# Patient Record
Sex: MALE | Race: WHITE | NOT HISPANIC OR LATINO | Employment: UNEMPLOYED | URBAN - METROPOLITAN AREA
[De-identification: names, ages, dates, MRNs, and addresses within clinical notes are randomized per-mention and may not be internally consistent; named-entity substitution may affect disease eponyms.]

---

## 2018-02-05 ENCOUNTER — HOSPITAL ENCOUNTER (EMERGENCY)
Facility: CLINIC | Age: 28
Discharge: HOME | End: 2018-02-05
Attending: EMERGENCY MEDICINE | Admitting: EMERGENCY MEDICINE

## 2018-02-05 DIAGNOSIS — F10.920 ALCOHOLIC INTOXICATION WITHOUT COMPLICATION (H): ICD-10-CM

## 2018-02-05 PROCEDURE — 99284 EMERGENCY DEPT VISIT MOD MDM: CPT | Mod: Z6 | Performed by: EMERGENCY MEDICINE

## 2018-02-05 PROCEDURE — 99285 EMERGENCY DEPT VISIT HI MDM: CPT | Performed by: EMERGENCY MEDICINE

## 2018-02-05 ASSESSMENT — ENCOUNTER SYMPTOMS
FEVER: 0
TROUBLE SWALLOWING: 0
SORE THROAT: 0
BACK PAIN: 0
FATIGUE: 0
NAUSEA: 0
VOMITING: 0
ABDOMINAL PAIN: 0
NECK PAIN: 0
CHILLS: 0

## 2018-02-05 NOTE — ED PROVIDER NOTES
History     Chief Complaint   Patient presents with     Alcohol Intoxication     Pt was found passed out in a snowbank. BIB police in cuffs.     The history is provided by the patient and the police. The history is limited by the condition of the patient (intoxication).     Dax Villareal is a 27 year old male, ADHD, smoker, who is brought in by police for concern of alcohol intoxication.    Local bar called PD as they found's somebody significant only intoxicated and was near passing out in a snowbank from intoxication.  They attempted to take the patient to [address] [place] [address] on the patient was initially amenable, however when they arrived there the patient refused services and therefore as the patient was not voluntary was not brought in further detox services.  He was then brought in by PD to the ED as he is not able to find a sober ride into whose care he could go.    Patient currently reports to alcohol use only but a fairly significant amount.  He reports that he tends to get intoxicated fairly often.  He is not interested in any detox services.  He denies any other drug use.  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  Please see ROS for further details.    I have reviewed the Medications, Allergies, Past Medical and Surgical History, and Social History in the Epic system.    Review of Systems   Reason unable to perform ROS: Intoxication.   Constitutional: Negative for chills, fatigue and fever.   HENT: Negative for sore throat and trouble swallowing.    Respiratory: Negative for cough and shortness of breath.    Cardiovascular: Negative for chest pain.   Gastrointestinal: Negative for abdominal pain, nausea and vomiting.   Musculoskeletal: Negative for back pain and neck pain.   Skin:        Old scars from working in     Allergic/Immunologic: Negative for immunocompromised state.       Physical Exam          Physical Exam  CONSTITUTIONAL: Well-developed and well-nourished. Intoxicated, but awake and alert. Non-toxic appearance. No acute distress.   HENT:   - Head: Normocephalic and atraumatic.   - Ears: Hearing and external ear grossly normal.   - Nose: Nose normal. No rhinorrhea. No epistaxis.   - Mouth/Throat: Oropharynx is clear and MMM  EYES: Conjunctivae and lids are normal. No scleral icterus.   NECK: Normal range of motion and phonation normal. Neck supple.  No tracheal deviation, no stridor. No edema or erythema noted.  CARDIOVASCULAR: Normal rate, regular rhythm and no appreciable abnormal heart sounds.  PULMONARY/CHEST: Effort normal. No accessory muscle usage or stridor. No respiratory distress.  No appreciable abnormal breath sounds.  MUSCULOSKELETAL: Extremities warm and seemingly well perfused. No edema or calf tenderness.  NEUROLOGIC: Awake, alert. Not disoriented. He displays no atrophy and no tremor. Normal tone. No seizure activity. GCS 15 (though intoxicated)  SKIN: Skin is warm and dry. No rash noted. No diaphoresis. No pallor.   PSYCHIATRIC: Normal mood and affect. Speech and behavior normal. Thought processes linear. Cognition and memory are normal.     ED Course     ED Course     Procedures               Labs Ordered and Resulted from Time of ED Arrival Up to the Time of Departure from the ED - No data to display         Assessments & Plan (with Medical Decision Making)   IMPRESSION: 27-year-old male, PMH notable for ADHD, brought in by law enforcement in the setting of alcohol intoxication having refused formal detox treatment at [address] [place] [address], with no ability to find a sober friend to safely escort him home and monitor him.  Clinically, patient appears nontoxic, NAD.  He is certainly intoxicated but has an appropriate gait.  No new or emergent physical exam findings that would need immediate  workup.  Outside of his intoxication he still mentating fairly well, no obvious neuro deficits.  We'll continue to monitor until sober or until he can find a responsible sober adult to come pick him up presuming ideally be able to tolerate by mouth as he does appear to be old to safely ambulate so far at this point.    PLAN: Clinically monitor until sober, manage as needed    RE-EVALUATION:  Patient able to ambulate still, unassisted. Has remained cooperative. Tolerated PO w/o issue.  - Patient's mother has arrived and is welling to assume care of the patient.     DISCUSSIONS:  - w/ Patient and his mother: I have reviewed the available findings, plan, need for close follow up, and strict return instructions with the patient and his mother. Mother is sober and will assume care of patient. They expressed understanding and agreement with this plan. All questions answered to the best of our ability at this time.     DISPOSITION/PLANNING:  - FINAL IMPRESSION: Acute alcohol intoxication  - DISPOSITION: DC to home into care of his mother  --- Follow-up: w/ PCP, information provided for this and Detox  --- Recommendations: Conservative symptom management, strict return instructions    ______________________________________________________________________________    - I have reviewed the available nursing notes.      New Prescriptions    No medications on file       Final diagnoses:   None       2/5/2018   [facility], [place], EMERGENCY DEPARTMENT     Casie Tai MD  02/06/18 2735

## 2018-02-05 NOTE — ED NOTES
Pt was given his cell phone to call a sober ride. Pt needed multiple reminders that he can only be discharged when he is sober or if he can find a sober friend to pick him up.

## 2018-02-05 NOTE — ED AVS SNAPSHOT
[facility], Emergency Department    [address]    [place] [address]    Phone:  [phone]    Fax:  [phone]                                       Dax Villareal   MRN: [id number]    Department:  [facility], Emergency Department   Date of Visit:  2/5/2018           Patient Information     Date Of Birth          1990        Your diagnoses for this visit were:     Alcoholic intoxication without complication (H)        You were seen by Casie Tai MD.        Discharge Instructions       FOLLOW-UP:  Please make an appointment to follow up with:  - [facility] (phone: [phone] and [facility] (phone: [phone])  - [facility]: [phone]    RETURN TO THE EMERGENCY DEPARTMENT  Return to the Emergency Department at any time for new/worsening symptoms.       Alcohol Intoxication  Alcohol intoxication occurs when you drink alcohol faster than your liver can remove it from your system. The following facts are important to remember:    It can take 10 minutes or more to start to feel the effects of a drink, so you can easily get more intoxicated than you intended.    One drink may be more than 1 serving of alcohol. Depending on the drink, it can be 2 to 4 servings.    It takes about an hour for your body to metabolize (clear) 1 serving. If you have more than 1 drink, it can take a couple of hours or more.    Many things affect how drinks will affect you, including whether you ve eaten, how fast you drink, your size, how much you normally drink (or not), medicines you take, chronic diseases you have, and gender.  Signs and symptoms of alcohol poisoning  The following are signs and symptoms of alcohol poisoning:  Mild impairment    Reduced inhibitions    Slurred speech    Drowsiness    Decreased fine motor skills  Moderate impairment    Erratic behavior, aggression, depression    Impaired judgment    Confusion    Concentration difficulties    Coordination  "problems  Severe impairment    Vomiting    Seizures    Unconsciousness    Cold, clammy    Slow or irregular breathing    Hypothermia (low body temperature)    Coma  Health effects  Alcohol abuse causes health problems. Sometimes this can happen after only drinking a  little.\" There is no set number of drinks or amount of alcohol that defines too much. The more you drink at one time, and the more frequently you drink determine both the short-term and long-term health effects. It affects all parts of your body and your health, including your:    Brain. Alcohol is a central nervous system depressant. It can damage parts of the brain that affect your balance, memory, thinking, and emotions. It can cause memory loss, blackouts, depression, agitation, sleep cycle changes, and seizures. These changes may or may not be reversible.    Heart and vascular system. Alcohol affects multiple areas. It can damage heart muscle causing cardiomyopathy, which is a weakening and stretching of the heart muscle. This can lead to trouble breathing, an irregular heartbeat, atrial fibrillation, leg swelling, and heart failure. It makes the blood vessels stiffen causing hypertension (high blood pressure). All of these problems increase your risk of having heart attacks or strokes.    Liver. Alcohol causes fat to build up in the liver, affecting its normal function. This increases the risk for hepatitis, leading to abdominal pain, appetite loss, jaundice, bleeding problems, liver fibrosis, and cirrhosis. This in turn can affect your ability to fight off infections, and can cause diabetes. The liver changes prevent it from removing toxins in your blood that can cause encephalopathy. Signs of this are confusion, altered level of consciousness, personality changes, memory loss, seizures, coma, and death.    Pancreas. Alcohol can cause inflammation of the pancreas, or pancreatitis. This can cause pain in your abdomen, fever, and " diabetes.    Immune system. Alcohol weakens your immune system in a number of ways. It suppresses your immune system making it harder to fight off infections and colds. You will also have a higher risk of certain infections like pneumonia and tuberculosis.    Cancer risk. Alcohol raises your risk of cancer of the mouth, esophagus, pharynx, larynx, liver, and breast.    Sexual function. Alcohol abuse can also lead to sexual problems.  Alcohol use during pregnancy may cause permanent damage to the growing baby.  Home care  The following guidelines will help you care for yourself at home:    Don't drink any more alcohol.    Don't drive until all effects of the alcohol have worn off.    Don't operate machinery that can cause injuries.    Get lots of rest over the next few days. Drink plenty of water and other non-alcoholic liquids. Try to eat regular meals.    If you have been drinking heavily on a daily basis, you may go through alcohol withdrawal. The usual symptoms last 3 to 4 days and may include nervousness, shakiness, nausea, sweating, sleeplessness, and can even cause seizures and a serious withdrawal symptom called delirium tremens, or DTs. During this time, it is best that you stay with family or friends who can help and support you. You can also admit yourself to a residential detox program. If your symptoms are severe (seizures, severe shakiness, confusion), contact your doctor or call an ambulance for help (see below).   Follow-up care  If alcohol is a problem in your life, these are some organizations that can help you:    Alcoholics Anonymous offers support through a self-help fellowship. There are no dues or fees. See the Yellow Pages and call for time and place of meetings. Find AA online at www.aa.org.    [place] offers support to families of alcohol users. Contact 554-606-8312, or online at www.al-anon.org.    National [place] on Alcoholism and Drug Dependence can be reached at 649-349-8137, or online  at www.ncadd.org.    There are also inpatient and residential alcohol detox programs. Check the Internet or phonebook Yellow Pages under  Drug Abuse and Treatment Centers.   Call 911  Call 911 if any of these occur:    Trouble breathing or slow irregular breathing    Chest pain    Sudden weakness on one side of your body or sudden trouble speaking    Heavy bleeding or vomiting blood    Very drowsy or trouble awakening    Fainting or loss of consciousness    Rapid heart rate    Seizure  When to seek medical advice  Call your healthcare provider right away if any of these occur:    Severe shakiness     Fever over 100.4  F (38.0  C)    Confusion or hallucinations (seeing, hearing, or feeling things that are not there)    Pain in your upper abdomen that gets worse    Repeated vomiting  Date Last Reviewed: 6/1/2016 2000-2017 [employer]. [address] [place] [address]. All rights reserved. This information is not intended as a substitute for professional medical care. Always follow your healthcare professional's instructions.            24 Hour Appointment Hotline       To make an appointment at any [facility], call [phone] ([phone]). If you don't have a family doctor or clinic, we will help you find one. [facility] clinics are conveniently located to serve the needs of you and your family.             Review of your medicines      Notice     You have not been prescribed any medications.            Orders Needing Specimen Collection     None      Pending Results     No orders found from 2/3/2018 to 2/6/2018.            Pending Culture Results     No orders found from 2/3/2018 to 2/6/2018.            Pending Results Instructions     If you had any lab results that were not finalized at the time of your Discharge, you can call the ED Lab Result RN at [phone]. You will be contacted by this team for any positive Lab results or changes in treatment. The nurses are available 7  "days a week from 10A to 6:30P.  You can leave a message 24 hours per day and they will return your call.        Thank you for choosing [facility]       Thank you for choosing [facility] for your care. Our goal is always to provide you with excellent care. Hearing back from our patients is one way we can continue to improve our services. Please take a few minutes to complete the written survey that you may receive in the mail after you visit with us. Thank you!        [facility] Information     [employer] lets you send messages to your doctor, view your test results, renew your prescriptions, schedule appointments and more. To sign up, go to [email or web address] . Click on \"Log in\" on the left side of the screen, which will take you to the Welcome page. Then click on \"Sign up Now\" on the right side of the page.     You will be asked to enter the access code listed below, as well as some personal information. Please follow the directions to create your username and password.     Your access code is: [email or web address]  Expires: 2018  5:09 AM     Your access code will  in 90 days. If you need help or a new code, please call your [facility] clinic or [phone].        Care EveryWhere ID     This is your Care EveryWhere ID. This could be used by other organizations to access your [facility] medical records  [id number]        Equal Access to Services     HENRIQUE ORTEGA AH: Hadii natalie Nina, waaxda ludebora, qaybta kaalaye delgadillo. So [facility] [phone].    ATENCIÓN: Si habla español, tiene a chavarria disposición servicios gratuitos de asistencia lingüística. Edwige al [phone].    We comply with applicable federal civil rights laws and [place] laws. We do not discriminate on the basis of race, color, national origin, age, disability, sex, sexual orientation, or gender identity.            After Visit Summary       This is your record. Keep this with you and show to your " community pharmacist(s) and doctor(s) at your next visit.

## 2018-02-05 NOTE — DISCHARGE INSTRUCTIONS
"FOLLOW-UP:  Please make an appointment to follow up with:  - [facility] (phone: [phone] and [facility] (phone: [phone])  - [facility]: [phone]    RETURN TO THE EMERGENCY DEPARTMENT  Return to the Emergency Department at any time for new/worsening symptoms.       Alcohol Intoxication  Alcohol intoxication occurs when you drink alcohol faster than your liver can remove it from your system. The following facts are important to remember:    It can take 10 minutes or more to start to feel the effects of a drink, so you can easily get more intoxicated than you intended.    One drink may be more than 1 serving of alcohol. Depending on the drink, it can be 2 to 4 servings.    It takes about an hour for your body to metabolize (clear) 1 serving. If you have more than 1 drink, it can take a couple of hours or more.    Many things affect how drinks will affect you, including whether you ve eaten, how fast you drink, your size, how much you normally drink (or not), medicines you take, chronic diseases you have, and gender.  Signs and symptoms of alcohol poisoning  The following are signs and symptoms of alcohol poisoning:  Mild impairment    Reduced inhibitions    Slurred speech    Drowsiness    Decreased fine motor skills  Moderate impairment    Erratic behavior, aggression, depression    Impaired judgment    Confusion    Concentration difficulties    Coordination problems  Severe impairment    Vomiting    Seizures    Unconsciousness    Cold, clammy    Slow or irregular breathing    Hypothermia (low body temperature)    Coma  Health effects  Alcohol abuse causes health problems. Sometimes this can happen after only drinking a  little.\" There is no set number of drinks or amount of alcohol that defines too much. The more you drink at one time, and the more frequently you drink determine both the short-term and long-term health effects. It affects all parts of your body and your " health, including your:    Brain. Alcohol is a central nervous system depressant. It can damage parts of the brain that affect your balance, memory, thinking, and emotions. It can cause memory loss, blackouts, depression, agitation, sleep cycle changes, and seizures. These changes may or may not be reversible.    Heart and vascular system. Alcohol affects multiple areas. It can damage heart muscle causing cardiomyopathy, which is a weakening and stretching of the heart muscle. This can lead to trouble breathing, an irregular heartbeat, atrial fibrillation, leg swelling, and heart failure. It makes the blood vessels stiffen causing hypertension (high blood pressure). All of these problems increase your risk of having heart attacks or strokes.    Liver. Alcohol causes fat to build up in the liver, affecting its normal function. This increases the risk for hepatitis, leading to abdominal pain, appetite loss, jaundice, bleeding problems, liver fibrosis, and cirrhosis. This in turn can affect your ability to fight off infections, and can cause diabetes. The liver changes prevent it from removing toxins in your blood that can cause encephalopathy. Signs of this are confusion, altered level of consciousness, personality changes, memory loss, seizures, coma, and death.    Pancreas. Alcohol can cause inflammation of the pancreas, or pancreatitis. This can cause pain in your abdomen, fever, and diabetes.    Immune system. Alcohol weakens your immune system in a number of ways. It suppresses your immune system making it harder to fight off infections and colds. You will also have a higher risk of certain infections like pneumonia and tuberculosis.    Cancer risk. Alcohol raises your risk of cancer of the mouth, esophagus, pharynx, larynx, liver, and breast.    Sexual function. Alcohol abuse can also lead to sexual problems.  Alcohol use during pregnancy may cause permanent damage to the growing baby.  Home care  The following  guidelines will help you care for yourself at home:    Don't drink any more alcohol.    Don't drive until all effects of the alcohol have worn off.    Don't operate machinery that can cause injuries.    Get lots of rest over the next few days. Drink plenty of water and other non-alcoholic liquids. Try to eat regular meals.    If you have been drinking heavily on a daily basis, you may go through alcohol withdrawal. The usual symptoms last 3 to 4 days and may include nervousness, shakiness, nausea, sweating, sleeplessness, and can even cause seizures and a serious withdrawal symptom called delirium tremens, or DTs. During this time, it is best that you stay with family or friends who can help and support you. You can also admit yourself to a residential detox program. If your symptoms are severe (seizures, severe shakiness, confusion), contact your doctor or call an ambulance for help (see below).   Follow-up care  If alcohol is a problem in your life, these are some organizations that can help you:    Alcoholics Anonymous offers support through a self-help fellowship. There are no dues or fees. See the Yellow Pages and call for time and place of meetings. Find AA online at www.aa.org.    [place] offers support to families of alcohol users. Contact 968-903-8095, or online at www.al-anon.org.    National [place] on Alcoholism and Drug Dependence can be reached at 853-636-3546, or online at www.ncadd.org.    There are also inpatient and residential alcohol detox programs. Check the Internet or phonebook Yellow Pages under  Drug Abuse and Treatment Centers.   Call 911  Call 911 if any of these occur:    Trouble breathing or slow irregular breathing    Chest pain    Sudden weakness on one side of your body or sudden trouble speaking    Heavy bleeding or vomiting blood    Very drowsy or trouble awakening    Fainting or loss of consciousness    Rapid heart rate    Seizure  When to seek medical advice  Call your healthcare  provider right away if any of these occur:    Severe shakiness     Fever over 100.4  F (38.0  C)    Confusion or hallucinations (seeing, hearing, or feeling things that are not there)    Pain in your upper abdomen that gets worse    Repeated vomiting  Date Last Reviewed: 6/1/2016 2000-2017 [employer]. [address] [place] [address]. All rights reserved. This information is not intended as a substitute for professional medical care. Always follow your healthcare professional's instructions.

## 2018-02-05 NOTE — ED AVS SNAPSHOT
[facility], [place], Emergency Department    [address]    [facility] [address]    Phone:  [phone]    Fax:  [phone]                                       Dax Villareal   MRN: [id number]    Department:  [facility], Emergency Department   Date of Visit:  2/5/2018           After Visit Summary Signature Page     I have received my discharge instructions, and my questions have been answered. I have discussed any challenges I see with this plan with the nurse or doctor.    ..........................................................................................................................................  Patient/Patient Representative Signature      ..........................................................................................................................................  Patient Representative Print Name and Relationship to Patient    ..................................................               ................................................  Date                                            Time    ..........................................................................................................................................  Reviewed by Signature/Title    ...................................................              ..............................................  Date                                                            Time

## 2018-02-05 NOTE — ED NOTES
Pt continues to ask if he can take an Uber or Lyft to get home. This RN attempted to reinforce that he has to sober up or find a friend to pick him up. No evidence of learning.

## 2018-02-06 ASSESSMENT — ENCOUNTER SYMPTOMS
COUGH: 0
SHORTNESS OF BREATH: 0

## 2020-10-06 ENCOUNTER — APPOINTMENT (OUTPATIENT)
Dept: ULTRASOUND IMAGING | Facility: HOSPITAL | Age: 30
End: 2020-10-06
Attending: EMERGENCY MEDICINE

## 2020-10-06 ENCOUNTER — APPOINTMENT (OUTPATIENT)
Dept: GENERAL RADIOLOGY | Facility: HOSPITAL | Age: 30
End: 2020-10-06
Attending: EMERGENCY MEDICINE

## 2020-10-06 ENCOUNTER — HOSPITAL ENCOUNTER (EMERGENCY)
Facility: HOSPITAL | Age: 30
Discharge: HOME | End: 2020-10-06
Attending: EMERGENCY MEDICINE | Admitting: EMERGENCY MEDICINE

## 2020-10-06 VITALS
OXYGEN SATURATION: 98 % | HEART RATE: 91 BPM | TEMPERATURE: 97.1 F | RESPIRATION RATE: 18 BRPM | SYSTOLIC BLOOD PRESSURE: 134 MMHG | DIASTOLIC BLOOD PRESSURE: 98 MMHG

## 2020-10-06 DIAGNOSIS — E87.1 HYPONATREMIA: ICD-10-CM

## 2020-10-06 DIAGNOSIS — K29.20 ACUTE ALCOHOLIC GASTRITIS, PRESENCE OF BLEEDING UNSPECIFIED: Primary | ICD-10-CM

## 2020-10-06 DIAGNOSIS — R11.10 RECURRENT VOMITING: ICD-10-CM

## 2020-10-06 DIAGNOSIS — E87.6 HYPOKALEMIA: ICD-10-CM

## 2020-10-06 LAB
ALBUMIN SERPL-MCNC: 3.8 G/DL (ref 3.4–5)
ALBUMIN UR-MCNC: 10 MG/DL
ALP SERPL-CCNC: 78 U/L (ref 40–150)
ALT SERPL W P-5'-P-CCNC: 75 U/L (ref 0–70)
AMPHETAMINES UR QL: NOT DETECTED NG/ML
ANION GAP SERPL CALCULATED.3IONS-SCNC: 12 MMOL/L (ref 3–14)
APPEARANCE UR: CLEAR
AST SERPL W P-5'-P-CCNC: 54 U/L (ref 0–45)
BACTERIA #/AREA URNS HPF: ABNORMAL /HPF
BARBITURATES UR QL SCN: NOT DETECTED NG/ML
BASOPHILS # BLD AUTO: 0 10E9/L (ref 0–0.2)
BASOPHILS NFR BLD AUTO: 0.6 %
BENZODIAZ UR QL SCN: NOT DETECTED NG/ML
BILIRUB SERPL-MCNC: 1.6 MG/DL (ref 0.2–1.3)
BILIRUB UR QL STRIP: NEGATIVE
BUN SERPL-MCNC: 31 MG/DL (ref 7–30)
BUPRENORPHINE UR QL: NOT DETECTED NG/ML
CALCIUM SERPL-MCNC: 9.6 MG/DL (ref 8.5–10.1)
CANNABINOIDS UR QL: NOT DETECTED NG/ML
CHLORIDE SERPL-SCNC: 79 MMOL/L (ref 94–109)
CO2 SERPL-SCNC: 37 MMOL/L (ref 20–32)
COCAINE UR QL SCN: NOT DETECTED NG/ML
COLOR UR AUTO: ABNORMAL
CREAT SERPL-MCNC: 0.83 MG/DL (ref 0.66–1.25)
CRP SERPL-MCNC: 16.9 MG/L (ref 0–8)
D-METHAMPHET UR QL: NOT DETECTED NG/ML
DIFFERENTIAL METHOD BLD: ABNORMAL
EOSINOPHIL # BLD AUTO: 0.1 10E9/L (ref 0–0.7)
EOSINOPHIL NFR BLD AUTO: 0.7 %
ERYTHROCYTE [DISTWIDTH] IN BLOOD BY AUTOMATED COUNT: 12.1 % (ref 10–15)
ETHANOL SERPL-MCNC: <0.01 G/DL
GFR SERPL CREATININE-BSD FRML MDRD: >90 ML/MIN/{1.73_M2}
GLUCOSE SERPL-MCNC: 120 MG/DL (ref 70–99)
GLUCOSE UR STRIP-MCNC: NEGATIVE MG/DL
HCT VFR BLD AUTO: 51.2 % (ref 40–53)
HGB BLD-MCNC: 18.8 G/DL (ref 13.3–17.7)
HGB UR QL STRIP: NEGATIVE
IMM GRANULOCYTES # BLD: 0.1 10E9/L (ref 0–0.4)
IMM GRANULOCYTES NFR BLD: 0.7 %
KETONES UR STRIP-MCNC: 40 MG/DL
LACTATE BLD-SCNC: 1.5 MMOL/L (ref 0.7–2)
LEUKOCYTE ESTERASE UR QL STRIP: NEGATIVE
LIPASE SERPL-CCNC: 139 U/L (ref 73–393)
LYMPHOCYTES # BLD AUTO: 0.8 10E9/L (ref 0.8–5.3)
LYMPHOCYTES NFR BLD AUTO: 11.6 %
MCH RBC QN AUTO: 32.6 PG (ref 26.5–33)
MCHC RBC AUTO-ENTMCNC: 36.7 G/DL (ref 31.5–36.5)
MCV RBC AUTO: 89 FL (ref 78–100)
METHADONE UR QL SCN: NOT DETECTED NG/ML
MONOCYTES # BLD AUTO: 0.8 10E9/L (ref 0–1.3)
MONOCYTES NFR BLD AUTO: 11 %
MUCOUS THREADS #/AREA URNS LPF: PRESENT /LPF
NEUTROPHILS # BLD AUTO: 5.2 10E9/L (ref 1.6–8.3)
NEUTROPHILS NFR BLD AUTO: 75.4 %
NITRATE UR QL: NEGATIVE
NRBC # BLD AUTO: 0 10*3/UL
NRBC BLD AUTO-RTO: 0 /100
OPIATES UR QL SCN: NOT DETECTED NG/ML
OXYCODONE UR QL SCN: NOT DETECTED NG/ML
PCP UR QL SCN: NOT DETECTED NG/ML
PH UR STRIP: 7 PH (ref 4.7–8)
PLATELET # BLD AUTO: 311 10E9/L (ref 150–450)
POTASSIUM SERPL-SCNC: 2.5 MMOL/L (ref 3.4–5.3)
POTASSIUM SERPL-SCNC: 2.9 MMOL/L (ref 3.4–5.3)
PROPOXYPH UR QL: NOT DETECTED NG/ML
PROT SERPL-MCNC: 8.4 G/DL (ref 6.8–8.8)
RBC # BLD AUTO: 5.77 10E12/L (ref 4.4–5.9)
RBC #/AREA URNS AUTO: 1 /HPF (ref 0–2)
SODIUM SERPL-SCNC: 128 MMOL/L (ref 133–144)
SOURCE: ABNORMAL
SP GR UR STRIP: 1.02 (ref 1–1.03)
TRICYCLICS UR QL SCN: NOT DETECTED NG/ML
UROBILINOGEN UR STRIP-MCNC: 2 MG/DL (ref 0–2)
WBC # BLD AUTO: 6.9 10E9/L (ref 4–11)
WBC #/AREA URNS AUTO: 1 /HPF (ref 0–5)

## 2020-10-06 PROCEDURE — 250N000013 HC RX MED GY IP 250 OP 250 PS 637: Performed by: EMERGENCY MEDICINE

## 2020-10-06 PROCEDURE — 85025 COMPLETE CBC W/AUTO DIFF WBC: CPT | Performed by: EMERGENCY MEDICINE

## 2020-10-06 PROCEDURE — 80306 DRUG TEST PRSMV INSTRMNT: CPT | Performed by: EMERGENCY MEDICINE

## 2020-10-06 PROCEDURE — C9113 INJ PANTOPRAZOLE SODIUM, VIA: HCPCS | Performed by: EMERGENCY MEDICINE

## 2020-10-06 PROCEDURE — 80320 DRUG SCREEN QUANTALCOHOLS: CPT | Performed by: EMERGENCY MEDICINE

## 2020-10-06 PROCEDURE — 86140 C-REACTIVE PROTEIN: CPT | Performed by: EMERGENCY MEDICINE

## 2020-10-06 PROCEDURE — 83605 ASSAY OF LACTIC ACID: CPT | Performed by: EMERGENCY MEDICINE

## 2020-10-06 PROCEDURE — 74019 RADEX ABDOMEN 2 VIEWS: CPT

## 2020-10-06 PROCEDURE — 99285 EMERGENCY DEPT VISIT HI MDM: CPT | Mod: 25

## 2020-10-06 PROCEDURE — 258N000003 HC RX IP 258 OP 636: Performed by: EMERGENCY MEDICINE

## 2020-10-06 PROCEDURE — 36415 COLL VENOUS BLD VENIPUNCTURE: CPT | Performed by: EMERGENCY MEDICINE

## 2020-10-06 PROCEDURE — 81001 URINALYSIS AUTO W/SCOPE: CPT | Performed by: EMERGENCY MEDICINE

## 2020-10-06 PROCEDURE — 250N000011 HC RX IP 250 OP 636: Performed by: EMERGENCY MEDICINE

## 2020-10-06 PROCEDURE — 83690 ASSAY OF LIPASE: CPT | Performed by: EMERGENCY MEDICINE

## 2020-10-06 PROCEDURE — 80053 COMPREHEN METABOLIC PANEL: CPT | Performed by: EMERGENCY MEDICINE

## 2020-10-06 PROCEDURE — 96365 THER/PROPH/DIAG IV INF INIT: CPT

## 2020-10-06 PROCEDURE — 96375 TX/PRO/DX INJ NEW DRUG ADDON: CPT

## 2020-10-06 PROCEDURE — 99285 EMERGENCY DEPT VISIT HI MDM: CPT | Performed by: EMERGENCY MEDICINE

## 2020-10-06 PROCEDURE — 96366 THER/PROPH/DIAG IV INF ADDON: CPT

## 2020-10-06 PROCEDURE — 76705 ECHO EXAM OF ABDOMEN: CPT

## 2020-10-06 PROCEDURE — 84132 ASSAY OF SERUM POTASSIUM: CPT | Performed by: EMERGENCY MEDICINE

## 2020-10-06 RX ORDER — LORAZEPAM 2 MG/ML
1 INJECTION INTRAMUSCULAR ONCE
Status: COMPLETED | OUTPATIENT
Start: 2020-10-06 | End: 2020-10-06

## 2020-10-06 RX ORDER — POTASSIUM CHLORIDE 1500 MG/1
20 TABLET, EXTENDED RELEASE ORAL 2 TIMES DAILY
Qty: 6 TABLET | Refills: 0 | Status: SHIPPED | OUTPATIENT
Start: 2020-10-06 | End: 2020-10-09

## 2020-10-06 RX ORDER — FAMOTIDINE 40 MG/1
40 TABLET, FILM COATED ORAL DAILY
Qty: 30 TABLET | Refills: 0 | Status: SHIPPED | OUTPATIENT
Start: 2020-10-06 | End: 2021-01-11

## 2020-10-06 RX ORDER — SODIUM CHLORIDE 9 MG/ML
INJECTION, SOLUTION INTRAVENOUS CONTINUOUS
Status: DISCONTINUED | OUTPATIENT
Start: 2020-10-06 | End: 2020-10-06 | Stop reason: HOSPADM

## 2020-10-06 RX ORDER — POTASSIUM CL/LIDO/0.9 % NACL 10MEQ/0.1L
10 INTRAVENOUS SOLUTION, PIGGYBACK (ML) INTRAVENOUS
Status: COMPLETED | OUTPATIENT
Start: 2020-10-06 | End: 2020-10-06

## 2020-10-06 RX ORDER — DIAZEPAM 5 MG
5 TABLET ORAL EVERY 6 HOURS PRN
Qty: 20 TABLET | Refills: 0 | Status: SHIPPED | OUTPATIENT
Start: 2020-10-06 | End: 2021-01-11

## 2020-10-06 RX ORDER — ONDANSETRON 4 MG/1
4 TABLET, ORALLY DISINTEGRATING ORAL EVERY 8 HOURS PRN
Qty: 10 TABLET | Refills: 0 | Status: SHIPPED | OUTPATIENT
Start: 2020-10-06 | End: 2020-10-09

## 2020-10-06 RX ORDER — POTASSIUM CHLORIDE 1500 MG/1
40 TABLET, EXTENDED RELEASE ORAL ONCE
Status: COMPLETED | OUTPATIENT
Start: 2020-10-06 | End: 2020-10-06

## 2020-10-06 RX ORDER — ONDANSETRON 2 MG/ML
4 INJECTION INTRAMUSCULAR; INTRAVENOUS ONCE
Status: COMPLETED | OUTPATIENT
Start: 2020-10-06 | End: 2020-10-06

## 2020-10-06 RX ADMIN — POTASSIUM CHLORIDE 40 MEQ: 1500 TABLET, EXTENDED RELEASE ORAL at 09:57

## 2020-10-06 RX ADMIN — Medication 10 MEQ: at 09:58

## 2020-10-06 RX ADMIN — Medication 10 MEQ: at 12:13

## 2020-10-06 RX ADMIN — LORAZEPAM 1 MG: 2 INJECTION INTRAMUSCULAR; INTRAVENOUS at 10:07

## 2020-10-06 RX ADMIN — SODIUM CHLORIDE 1000 ML: 9 INJECTION, SOLUTION INTRAVENOUS at 10:07

## 2020-10-06 RX ADMIN — ONDANSETRON 4 MG: 2 INJECTION INTRAMUSCULAR; INTRAVENOUS at 09:58

## 2020-10-06 RX ADMIN — PANTOPRAZOLE SODIUM 40 MG: 40 INJECTION, POWDER, FOR SOLUTION INTRAVENOUS at 10:07

## 2020-10-06 ASSESSMENT — ENCOUNTER SYMPTOMS
ABDOMINAL PAIN: 1
NAUSEA: 1
SHORTNESS OF BREATH: 0
VOMITING: 1
FEVER: 0

## 2020-10-06 NOTE — ED PROVIDER NOTES
History     Chief Complaint   Patient presents with     Vomiting     x3 days. epigastric discomfort stating due to vomiting. no abd pain noted. has appendix and gallbladder. last drank alcohol Friday-states drinks 5 days a week.      JOSE Villareal is a 29 year old male who presents to the ED via private means.  Patient gives a 3-day history of recurrent episodes of vomiting with associated nausea and epigastric abdominal pain.  He has been drinking a lot of alcohol at least 4 bottles of shots every day for at least 5 days/week.  Last time he drank alcohol was 2 days ago and he is feeling a little shaky today.  He has never had pancreatitis.  His last bowel movement was this morning with a small amount of hard stool being passed.  He has not been able to eat or retain anything for the last 3 days.  Denies abdominal distention, prior abdominal surgery or history of small bowel obstruction.  No fever, chills, loss of taste or smell.  No cough or shortness of breath.    Allergies:  No Known Allergies    Problem List:    There are no active problems to display for this patient.       Past Medical History:    Past Medical History:   Diagnosis Date     ADHD 2008       Past Surgical History:    Past Surgical History:   Procedure Laterality Date     ORTHOPEDIC SURGERY         Family History:    No family history on file.    Social History:  Marital Status:  Single [1]  Social History     Tobacco Use     Smoking status: Current Every Day Smoker   Substance Use Topics     Alcohol use: Yes     Drug use: No        Medications:         diazepam (VALIUM) 5 MG tablet       famotidine (PEPCID) 40 MG tablet       ondansetron (ZOFRAN ODT) 4 MG ODT tab       potassium chloride ER (KLOR-CON M) 20 MEQ CR tablet          Review of Systems   Constitutional: Negative for fever.   Respiratory: Negative for shortness of breath.    Cardiovascular: Negative for chest pain.   Gastrointestinal: Positive for abdominal pain, nausea and  vomiting.   All other systems reviewed and are negative.      Physical Exam   BP: 147/85  Pulse: 116  Temp: 97.4  F (36.3  C)  Resp: 16  SpO2: 95 %      Physical Exam  Vitals signs and nursing note reviewed.   Constitutional:       General: He is not in acute distress.     Appearance: He is well-developed. He is not diaphoretic.   HENT:      Head: Normocephalic and atraumatic.   Eyes:      Pupils: Pupils are equal, round, and reactive to light.   Cardiovascular:      Rate and Rhythm: Normal rate and regular rhythm.      Heart sounds: Normal heart sounds.   Pulmonary:      Effort: Pulmonary effort is normal. No respiratory distress.      Breath sounds: Normal breath sounds. No stridor.   Abdominal:      General: Bowel sounds are normal. There is no distension.      Tenderness: There is no abdominal tenderness.   Musculoskeletal:         General: No tenderness or deformity.   Neurological:      Mental Status: He is alert and oriented to person, place, and time.      Cranial Nerves: No cranial nerve deficit.         ED Course   Patient evaluated and laboratory tests ordered.  Started on IV fluid hydration.  IV Ativan given for alcohol withdrawal.  IV Protonix given for epigastric abdominal pain.  IV Zofran given for nausea and vomiting.    Procedures      Results for orders placed or performed during the hospital encounter of 10/06/20 (from the past 24 hour(s))   Lactic acid whole blood   Result Value Ref Range    Lactic Acid 1.5 0.7 - 2.0 mmol/L   CBC with platelets differential   Result Value Ref Range    WBC 6.9 4.0 - 11.0 10e9/L    RBC Count 5.77 4.4 - 5.9 10e12/L    Hemoglobin 18.8 (H) 13.3 - 17.7 g/dL    Hematocrit 51.2 40.0 - 53.0 %    MCV 89 78 - 100 fl    MCH 32.6 26.5 - 33.0 pg    MCHC 36.7 (H) 31.5 - 36.5 g/dL    RDW 12.1 10.0 - 15.0 %    Platelet Count 311 150 - 450 10e9/L    Diff Method Automated Method     % Neutrophils 75.4 %    % Lymphocytes 11.6 %    % Monocytes 11.0 %    % Eosinophils 0.7 %    %  Basophils 0.6 %    % Immature Granulocytes 0.7 %    Nucleated RBCs 0 0 /100    Absolute Neutrophil 5.2 1.6 - 8.3 10e9/L    Absolute Lymphocytes 0.8 0.8 - 5.3 10e9/L    Absolute Monocytes 0.8 0.0 - 1.3 10e9/L    Absolute Eosinophils 0.1 0.0 - 0.7 10e9/L    Absolute Basophils 0.0 0.0 - 0.2 10e9/L    Abs Immature Granulocytes 0.1 0 - 0.4 10e9/L    Absolute Nucleated RBC 0.0    Comprehensive metabolic panel   Result Value Ref Range    Sodium 128 (L) 133 - 144 mmol/L    Potassium 2.5 (LL) 3.4 - 5.3 mmol/L    Chloride 79 (L) 94 - 109 mmol/L    Carbon Dioxide 37 (H) 20 - 32 mmol/L    Anion Gap 12 3 - 14 mmol/L    Glucose 120 (H) 70 - 99 mg/dL    Urea Nitrogen 31 (H) 7 - 30 mg/dL    Creatinine 0.83 0.66 - 1.25 mg/dL    GFR Estimate >90 >60 mL/min/[1.73_m2]    GFR Estimate If Black >90 >60 mL/min/[1.73_m2]    Calcium 9.6 8.5 - 10.1 mg/dL    Bilirubin Total 1.6 (H) 0.2 - 1.3 mg/dL    Albumin 3.8 3.4 - 5.0 g/dL    Protein Total 8.4 6.8 - 8.8 g/dL    Alkaline Phosphatase 78 40 - 150 U/L    ALT 75 (H) 0 - 70 U/L    AST 54 (H) 0 - 45 U/L   CRP inflammation   Result Value Ref Range    CRP Inflammation 16.9 (H) 0.0 - 8.0 mg/L   Alcohol ethyl   Result Value Ref Range    Ethanol g/dL <0.01 0.01 g/dL   Lipase   Result Value Ref Range    Lipase 139 73 - 393 U/L   XR Abdomen 2 Views    Narrative    PROCEDURE:  XR ABDOMEN 2 VW    HISTORY:  Recurrent vomiting with generalized abdominal pain.    TECHNIQUE:  AP and supine radiographs of the abdomen.    COMPARISON:  None.    FINDINGS:     The lung bases are clear. No subdiaphragmatic air is seen.    No dilated loops of small bowel or air-fluid levels are seen.      Impression    IMPRESSION:    No obstruction or free air.    SYED BACA MD   US Abdomen Limited    Narrative    PROCEDURES: US ABDOMEN LIMITED    HISTORY: Recurrent vomiting with hyperbilirubinemia    TECHNIQUE: Grayscale ultrasound of the upper abdomen was performed.    COMPARISON: none     FINDINGS:    MEASUREMENTS:    Liver length:  18 cm.   Common duct diameter:  0.5 cm.    LIVER: Is increased echogenicity of the liver consistent with fatty  infiltration. No masses or biliary ductal enlargement is seen.    GALLBLADDER: Some sludge is seen within the gallbladder. There are  some small gallbladder polyps noted.    ABDOMINAL AORTA AND IVC: The abdominal aorta is normally tapering. The  inferior vena cava is patent.    PANCREAS: Pancreas was obscured by bowel gas.    Right KIDNEY: Right kidney is normal in size and is free of masses or  hydronephrosis.        Impression    IMPRESSION:     fatty infiltration of the liver without masses or biliary ductal  enlargement.    NICOLASA HERRERA MD   UA reflex to Microscopic and Culture    Specimen: Midstream Urine   Result Value Ref Range    Color Urine Orange     Appearance Urine Clear     Glucose Urine Negative NEG^Negative mg/dL    Bilirubin Urine Negative NEG^Negative    Ketones Urine 40 (A) NEG^Negative mg/dL    Specific Gravity Urine 1.021 1.003 - 1.035    Blood Urine Negative NEG^Negative    pH Urine 7.0 4.7 - 8.0 pH    Protein Albumin Urine 10 (A) NEG^Negative mg/dL    Urobilinogen mg/dL 2.0 0.0 - 2.0 mg/dL    Nitrite Urine Negative NEG^Negative    Leukocyte Esterase Urine Negative NEG^Negative    Source Midstream Urine     RBC Urine 1 0 - 2 /HPF    WBC Urine 1 0 - 5 /HPF    Bacteria Urine None (A) NEG^Negative /HPF    Mucous Urine Present (A) NEG^Negative /LPF   Urine Drugs of Abuse Screen Panel 13   Result Value Ref Range    Cannabinoids (50-gxz-6-carboxy-9-THC) Not Detected NDET^Not Detected ng/mL    Phencyclidine (Phencyclidine) Not Detected NDET^Not Detected ng/mL    Cocaine (Benzoylecgonine) Not Detected NDET^Not Detected ng/mL    Methamphetamine (d-Methamphetamine) Not Detected NDET^Not Detected ng/mL    Opiates (Morphine) Not Detected NDET^Not Detected ng/mL    Amphetamine (d-Amphetamine) Not Detected NDET^Not Detected ng/mL    Benzodiazepines (Nordiazepam) Not Detected  NDET^Not Detected ng/mL    Tricyclic Antidepressants (Desipramine) Not Detected NDET^Not Detected ng/mL    Methadone (Methadone) Not Detected NDET^Not Detected ng/mL    Barbiturates (Butalbital) Not Detected NDET^Not Detected ng/mL    Oxycodone (Oxycodone) Not Detected NDET^Not Detected ng/mL    Propoxyphene (Norpropoxyphene) Not Detected NDET^Not Detected ng/mL    Buprenorphine (Buprenorphine) Not Detected NDET^Not Detected ng/mL   Potassium   Result Value Ref Range    Potassium 2.9 (LL) 3.4 - 5.3 mmol/L       Medications   0.9% sodium chloride BOLUS (0 mLs Intravenous Stopped 10/6/20 1127)     Followed by   sodium chloride 0.9% infusion (has no administration in time range)   potassium chloride 10 mEq in 100 mL intermittent infusion with 10 mg lidocaine (0 mEq Intravenous Stopped 10/6/20 1309)   potassium chloride ER (KLOR-CON M) CR tablet 40 mEq (40 mEq Oral Given 10/6/20 0957)   ondansetron (ZOFRAN) injection 4 mg (4 mg Intravenous Given 10/6/20 0958)   pantoprazole (PROTONIX) IV push injection 40 mg (40 mg Intravenous Given 10/6/20 1007)   LORazepam (ATIVAN) injection 1 mg (1 mg Intravenous Given 10/6/20 1007)       Assessments & Plan (with Medical Decision Making)   Alcoholic gastritis with recurrent vomiting: Presented to the ED with a 3-day episode of recurrent episodes of vomiting after drinking alcohol and on a daily basis the whole of last week.  Last time he drank alcohol was 2 days ago.  He was feeling shaky and felt like he was withdrawing from alcohol.  Patient received IV normal saline at the ED together with IV Ativan and his jitteriness improved.  He also received IV Protonix and the epigastric abdominal pain improved.  Zofran ODT prescription given for home use and advised not to take any alcohol.  Encouraged on bland diet.  Valium given for anxiety and jitteriness.  Hyponatremia: Likely secondary to recurrent vomiting for 3 days.  Received IV normal saline 1 L and advised to restrict oral fluid  intake to no more than 1.5 L in 24 hours.  Should have electrolytes rechecked by PCP tomorrow.  Hypokalemia: Secondary to protracted vomiting for 3 days secondary to alcoholic gastritis.  Received 40 mEq of oral K-Dur and 20 mEq of  IV KCl.  Initial potassium level was 2.5 and repeat potassium after above treatment was 2.9.  Will discharge home on K-Dur 20 milliequivalents oral twice daily and recheck of potassium levels tomorrow.  Alcohol abuse: Offered referral to detoxification but he declined.  Received IV Ativan at the ED and his anxiety improved.  Will discharge home on Valium tablets for the next few days.  Strongly encouraged to consider going for alcohol detoxification.    I have reviewed the nursing notes.    I have reviewed the findings, diagnosis, plan and need for follow up with the patient.    Discharge Medication List as of 10/6/2020  1:27 PM      START taking these medications    Details   diazepam (VALIUM) 5 MG tablet Take 1 tablet (5 mg) by mouth every 6 hours as needed for anxiety or sleep, Disp-20 tablet, R-0, E-Prescribe      famotidine (PEPCID) 40 MG tablet Take 1 tablet (40 mg) by mouth daily, Disp-30 tablet, R-0, E-Prescribe      ondansetron (ZOFRAN ODT) 4 MG ODT tab Take 1 tablet (4 mg) by mouth every 8 hours as needed for nausea or vomiting, Disp-10 tablet, R-0, E-Prescribe      potassium chloride ER (KLOR-CON M) 20 MEQ CR tablet Take 1 tablet (20 mEq) by mouth 2 times daily for 3 days, Disp-6 tablet, R-0, E-Prescribe             Final diagnoses:   Hypokalemia   Recurrent vomiting   Hyponatremia   Acute alcoholic gastritis, presence of bleeding unspecified       10/6/2020   [facility]     Luis Daniel Carter MD  10/06/20 2224

## 2020-10-06 NOTE — ED NOTES
Pt here with c/o nausea and vomiting x3 days. Pt states he drinks alcohol regularly, last drank Friday and woke Saturday with vomiting. Pt states he thought he was just hung over, but then continued to vomit through the weekend and is still unable to keep food down.

## 2020-10-06 NOTE — ED NOTES
Pt tolerated boxed lunch without issue. Discharge instructions reviewed including new medications. New medications sent to pt's pharmacy. Instructed to return with any new or worsening symptoms. Verbalizes understanding.

## 2020-10-06 NOTE — ED NOTES
"Care Transitions focused note:      Chart reviewed, discussed with ROB Joseph.  Pt is a 29 year old male being seen for Hypokalemia, vomiting etc.  Pt has had several ED and hospital visits for alcohol abuse and withdrawls.  States he has been drinking more since he has been out of work and during the quarantine.      Dax states he doesn't feel he has an issue and has just been hanging out with \"drinking buddies\" and really just wants to get back to work.  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    Discussed depression, boredom and ETOH.  I offered to get him hooked up with health insurance, and a primary MD but he is not sure if he is going to be staying in the area.  Declines any referrals for CD or counseling.     I provided patient with my name and number in case he decides he wants to stay in the area and establish care.    Will follow as needed.  Updated RN    DARNELL Morales  "

## 2020-10-06 NOTE — ED AVS SNAPSHOT
[facility]  [address] [address]  Phone: [phone]                                    Dax Villareal   MRN: [id number]    Department: [facility]   Date of Visit: 10/6/2020           After Visit Summary Signature Page    I have received my discharge instructions, and my questions have been answered. I have discussed any challenges I see with this plan with the nurse or doctor.    ..........................................................................................................................................  Patient/Patient Representative Signature      ..........................................................................................................................................  Patient Representative Print Name and Relationship to Patient    ..................................................               ................................................  Date                                   Time    ..........................................................................................................................................  Reviewed by Signature/Title    ...................................................              ..............................................  Date                                               Time          22EPIC Rev 08/18

## 2020-10-06 NOTE — ED NOTES
DATE:  10/6/2020   TIME OF RECEIPT FROM LAB:  0951  LAB TEST:  potassium  LAB VALUE:  2.5  RESULTS GIVEN WITH READ-BACK TO (PROVIDER):  Luis Daniel Carter MD  TIME LAB VALUE REPORTED TO PROVIDER:   0959

## 2020-10-06 NOTE — ED NOTES
DATE:  10/6/2020   TIME OF RECEIPT FROM LAB:  1888  LAB TEST:  K+  LAB VALUE:  2.9  RESULTS GIVEN WITH READ-BACK TO Dr Carter  TIME LAB VALUE REPORTED TO PROVIDER:   5175

## 2020-11-16 ENCOUNTER — TELEPHONE (OUTPATIENT)
Dept: FAMILY MEDICINE | Facility: OTHER | Age: 30
End: 2020-11-16

## 2020-11-16 NOTE — TELEPHONE ENCOUNTER
Call from pt requesting refill on medications that were filled by ED provider on 10/6/20. Pt does not have a PCP notified pt he will be in need of scheduling an appt with a provider to be prescribed medications. Pt states he does not have medical insurance, will call back if he is going to schedule an appt.

## 2020-12-09 ENCOUNTER — HOSPITAL ENCOUNTER (EMERGENCY)
Facility: HOSPITAL | Age: 30
Discharge: HOME | End: 2020-12-09
Attending: NURSE PRACTITIONER | Admitting: NURSE PRACTITIONER

## 2020-12-09 VITALS
DIASTOLIC BLOOD PRESSURE: 92 MMHG | OXYGEN SATURATION: 93 % | SYSTOLIC BLOOD PRESSURE: 143 MMHG | RESPIRATION RATE: 16 BRPM | TEMPERATURE: 98.2 F | HEART RATE: 89 BPM

## 2020-12-09 DIAGNOSIS — E87.6 HYPOKALEMIA: ICD-10-CM

## 2020-12-09 DIAGNOSIS — R11.2 NON-INTRACTABLE VOMITING WITH NAUSEA, UNSPECIFIED VOMITING TYPE: ICD-10-CM

## 2020-12-09 DIAGNOSIS — R21 RASH: ICD-10-CM

## 2020-12-09 LAB
ALBUMIN SERPL-MCNC: 3.7 G/DL (ref 3.4–5)
ALBUMIN UR-MCNC: 10 MG/DL
ALP SERPL-CCNC: 85 U/L (ref 40–150)
ALT SERPL W P-5'-P-CCNC: 181 U/L (ref 0–70)
AMPHETAMINES UR QL: NOT DETECTED NG/ML
ANION GAP SERPL CALCULATED.3IONS-SCNC: 10 MMOL/L (ref 3–14)
APPEARANCE UR: CLEAR
AST SERPL W P-5'-P-CCNC: 225 U/L (ref 0–45)
BACTERIA #/AREA URNS HPF: ABNORMAL /HPF
BARBITURATES UR QL SCN: NOT DETECTED NG/ML
BASOPHILS # BLD AUTO: 0.1 10E9/L (ref 0–0.2)
BASOPHILS NFR BLD AUTO: 1.4 %
BENZODIAZ UR QL SCN: ABNORMAL NG/ML
BILIRUB SERPL-MCNC: 0.6 MG/DL (ref 0.2–1.3)
BILIRUB UR QL STRIP: NEGATIVE
BUN SERPL-MCNC: 6 MG/DL (ref 7–30)
BUPRENORPHINE UR QL: NOT DETECTED NG/ML
CALCIUM SERPL-MCNC: 8.4 MG/DL (ref 8.5–10.1)
CANNABINOIDS UR QL: ABNORMAL NG/ML
CHLORIDE SERPL-SCNC: 100 MMOL/L (ref 94–109)
CO2 SERPL-SCNC: 31 MMOL/L (ref 20–32)
COCAINE UR QL SCN: NOT DETECTED NG/ML
COLOR UR AUTO: ABNORMAL
CREAT SERPL-MCNC: 0.65 MG/DL (ref 0.66–1.25)
D-METHAMPHET UR QL: NOT DETECTED NG/ML
DIFFERENTIAL METHOD BLD: ABNORMAL
EOSINOPHIL # BLD AUTO: 0.2 10E9/L (ref 0–0.7)
EOSINOPHIL NFR BLD AUTO: 4.5 %
ERYTHROCYTE [DISTWIDTH] IN BLOOD BY AUTOMATED COUNT: 13.1 % (ref 10–15)
ETHANOL SERPL-MCNC: 0.38 G/DL
ETHANOL SERPL-MCNC: 0.51 G/DL
GFR SERPL CREATININE-BSD FRML MDRD: >90 ML/MIN/{1.73_M2}
GLUCOSE SERPL-MCNC: 101 MG/DL (ref 70–99)
GLUCOSE UR STRIP-MCNC: NEGATIVE MG/DL
HCT VFR BLD AUTO: 47.7 % (ref 40–53)
HGB BLD-MCNC: 17.2 G/DL (ref 13.3–17.7)
HGB UR QL STRIP: NEGATIVE
IMM GRANULOCYTES # BLD: 0 10E9/L (ref 0–0.4)
IMM GRANULOCYTES NFR BLD: 0.2 %
KETONES UR STRIP-MCNC: NEGATIVE MG/DL
LACTATE BLD-SCNC: 1.9 MMOL/L (ref 0.7–2)
LACTATE BLD-SCNC: 2.4 MMOL/L (ref 0.7–2)
LEUKOCYTE ESTERASE UR QL STRIP: NEGATIVE
LIPASE SERPL-CCNC: 140 U/L (ref 73–393)
LYMPHOCYTES # BLD AUTO: 1.3 10E9/L (ref 0.8–5.3)
LYMPHOCYTES NFR BLD AUTO: 30.1 %
MCH RBC QN AUTO: 33.7 PG (ref 26.5–33)
MCHC RBC AUTO-ENTMCNC: 36.1 G/DL (ref 31.5–36.5)
MCV RBC AUTO: 93 FL (ref 78–100)
METHADONE UR QL SCN: NOT DETECTED NG/ML
MONOCYTES # BLD AUTO: 0.6 10E9/L (ref 0–1.3)
MONOCYTES NFR BLD AUTO: 13 %
NEUTROPHILS # BLD AUTO: 2.1 10E9/L (ref 1.6–8.3)
NEUTROPHILS NFR BLD AUTO: 50.8 %
NITRATE UR QL: NEGATIVE
NRBC # BLD AUTO: 0 10*3/UL
NRBC BLD AUTO-RTO: 0 /100
OPIATES UR QL SCN: NOT DETECTED NG/ML
OXYCODONE UR QL SCN: NOT DETECTED NG/ML
PCP UR QL SCN: NOT DETECTED NG/ML
PH UR STRIP: 7 PH (ref 4.7–8)
PLATELET # BLD AUTO: 299 10E9/L (ref 150–450)
POTASSIUM SERPL-SCNC: 2.9 MMOL/L (ref 3.4–5.3)
POTASSIUM SERPL-SCNC: 3.3 MMOL/L (ref 3.4–5.3)
PROPOXYPH UR QL: NOT DETECTED NG/ML
PROT SERPL-MCNC: 7.8 G/DL (ref 6.8–8.8)
RBC # BLD AUTO: 5.11 10E12/L (ref 4.4–5.9)
RBC #/AREA URNS AUTO: 0 /HPF (ref 0–2)
SODIUM SERPL-SCNC: 141 MMOL/L (ref 133–144)
SOURCE: ABNORMAL
SP GR UR STRIP: 1.01 (ref 1–1.03)
SQUAMOUS #/AREA URNS AUTO: 0 /HPF (ref 0–1)
TRICYCLICS UR QL SCN: NOT DETECTED NG/ML
UROBILINOGEN UR STRIP-MCNC: NORMAL MG/DL (ref 0–2)
WBC # BLD AUTO: 4.2 10E9/L (ref 4–11)
WBC #/AREA URNS AUTO: 0 /HPF (ref 0–5)

## 2020-12-09 PROCEDURE — 81001 URINALYSIS AUTO W/SCOPE: CPT | Performed by: NURSE PRACTITIONER

## 2020-12-09 PROCEDURE — 80306 DRUG TEST PRSMV INSTRMNT: CPT | Performed by: NURSE PRACTITIONER

## 2020-12-09 PROCEDURE — 85025 COMPLETE CBC W/AUTO DIFF WBC: CPT | Performed by: NURSE PRACTITIONER

## 2020-12-09 PROCEDURE — 99284 EMERGENCY DEPT VISIT MOD MDM: CPT | Performed by: NURSE PRACTITIONER

## 2020-12-09 PROCEDURE — 80320 DRUG SCREEN QUANTALCOHOLS: CPT | Performed by: NURSE PRACTITIONER

## 2020-12-09 PROCEDURE — 83690 ASSAY OF LIPASE: CPT | Performed by: NURSE PRACTITIONER

## 2020-12-09 PROCEDURE — 84132 ASSAY OF SERUM POTASSIUM: CPT | Mod: 91 | Performed by: NURSE PRACTITIONER

## 2020-12-09 PROCEDURE — 80053 COMPREHEN METABOLIC PANEL: CPT | Performed by: NURSE PRACTITIONER

## 2020-12-09 PROCEDURE — 83605 ASSAY OF LACTIC ACID: CPT | Performed by: NURSE PRACTITIONER

## 2020-12-09 PROCEDURE — 96375 TX/PRO/DX INJ NEW DRUG ADDON: CPT

## 2020-12-09 PROCEDURE — 96365 THER/PROPH/DIAG IV INF INIT: CPT

## 2020-12-09 PROCEDURE — 250N000013 HC RX MED GY IP 250 OP 250 PS 637: Performed by: NURSE PRACTITIONER

## 2020-12-09 PROCEDURE — 258N000003 HC RX IP 258 OP 636: Performed by: NURSE PRACTITIONER

## 2020-12-09 PROCEDURE — 250N000011 HC RX IP 250 OP 636: Performed by: NURSE PRACTITIONER

## 2020-12-09 PROCEDURE — 36415 COLL VENOUS BLD VENIPUNCTURE: CPT | Performed by: NURSE PRACTITIONER

## 2020-12-09 PROCEDURE — 96366 THER/PROPH/DIAG IV INF ADDON: CPT

## 2020-12-09 PROCEDURE — 99284 EMERGENCY DEPT VISIT MOD MDM: CPT | Mod: 25

## 2020-12-09 RX ORDER — SODIUM CHLORIDE 9 MG/ML
INJECTION, SOLUTION INTRAVENOUS CONTINUOUS
Status: DISCONTINUED | OUTPATIENT
Start: 2020-12-09 | End: 2020-12-09 | Stop reason: HOSPADM

## 2020-12-09 RX ORDER — DIPHENHYDRAMINE HYDROCHLORIDE 50 MG/ML
25 INJECTION INTRAMUSCULAR; INTRAVENOUS ONCE
Status: DISCONTINUED | OUTPATIENT
Start: 2020-12-09 | End: 2020-12-09 | Stop reason: HOSPADM

## 2020-12-09 RX ORDER — PREDNISONE 20 MG/1
40 TABLET ORAL DAILY
Qty: 8 TABLET | Refills: 0 | Status: SHIPPED | OUTPATIENT
Start: 2020-12-09 | End: 2021-01-11

## 2020-12-09 RX ORDER — PREDNISONE 20 MG/1
40 TABLET ORAL DAILY
Qty: 8 TABLET | Refills: 0 | Status: SHIPPED | OUTPATIENT
Start: 2020-12-09 | End: 2020-12-09

## 2020-12-09 RX ORDER — POTASSIUM CHLORIDE 7.45 MG/ML
10 INJECTION INTRAVENOUS
Status: COMPLETED | OUTPATIENT
Start: 2020-12-09 | End: 2020-12-09

## 2020-12-09 RX ORDER — ONDANSETRON 2 MG/ML
4 INJECTION INTRAMUSCULAR; INTRAVENOUS EVERY 30 MIN PRN
Status: DISCONTINUED | OUTPATIENT
Start: 2020-12-09 | End: 2020-12-09 | Stop reason: HOSPADM

## 2020-12-09 RX ORDER — METHYLPREDNISOLONE SODIUM SUCCINATE 125 MG/2ML
60 INJECTION, POWDER, LYOPHILIZED, FOR SOLUTION INTRAMUSCULAR; INTRAVENOUS ONCE
Status: COMPLETED | OUTPATIENT
Start: 2020-12-09 | End: 2020-12-09

## 2020-12-09 RX ORDER — DIAZEPAM 2 MG
2 TABLET ORAL ONCE
Status: COMPLETED | OUTPATIENT
Start: 2020-12-09 | End: 2020-12-09

## 2020-12-09 RX ORDER — POTASSIUM CHLORIDE 1500 MG/1
40 TABLET, EXTENDED RELEASE ORAL ONCE
Status: COMPLETED | OUTPATIENT
Start: 2020-12-09 | End: 2020-12-09

## 2020-12-09 RX ORDER — POTASSIUM CHLORIDE 1500 MG/1
20 TABLET, EXTENDED RELEASE ORAL 2 TIMES DAILY
Qty: 14 TABLET | Refills: 0 | Status: SHIPPED | OUTPATIENT
Start: 2020-12-09 | End: 2020-12-16

## 2020-12-09 RX ADMIN — DIAZEPAM 2 MG: 2 TABLET ORAL at 17:48

## 2020-12-09 RX ADMIN — POTASSIUM CHLORIDE 10 MEQ: 7.46 INJECTION, SOLUTION INTRAVENOUS at 18:08

## 2020-12-09 RX ADMIN — POTASSIUM CHLORIDE 10 MEQ: 7.46 INJECTION, SOLUTION INTRAVENOUS at 16:09

## 2020-12-09 RX ADMIN — SODIUM CHLORIDE 1000 ML: 9 INJECTION, SOLUTION INTRAVENOUS at 15:09

## 2020-12-09 RX ADMIN — METHYLPREDNISOLONE SODIUM SUCCINATE 62.5 MG: 125 INJECTION, POWDER, FOR SOLUTION INTRAMUSCULAR; INTRAVENOUS at 17:04

## 2020-12-09 RX ADMIN — POTASSIUM CHLORIDE 40 MEQ: 1500 TABLET, EXTENDED RELEASE ORAL at 16:10

## 2020-12-09 RX ADMIN — ONDANSETRON 4 MG: 2 INJECTION INTRAMUSCULAR; INTRAVENOUS at 15:06

## 2020-12-09 NOTE — ED NOTES
DATE:  12/9/2020   TIME OF RECEIPT FROM LAB:  4706  LAB TEST:  K+  LAB VALUE:  2.9  RESULTS GIVEN WITH READ-BACK TO [facility]    TIME LAB VALUE REPORTED TO PROVIDER:  3566

## 2020-12-09 NOTE — ED PROVIDER NOTES
"  History     Chief Complaint   Patient presents with     Extremity Weakness     since COVID began in March     Vomiting     every day for 7 months     Alcohol Intoxication     States he drinks \"half a 175 of vodka daily\"     Rash     for 3 weeks     HPI     Dax Villareal is a 30 year old male who presents ambulatory from home for evaluation of rash; however, also states that while he is here he wants a \"full work-up\" due to ongoing generalized weakness, nausea and vomiting, and inflammation of his liver which he was told he had a prior ED visit. He does admit to currently being intoxicated and states that he drinks 1 liter to 1.75 liter of vodka daily. States he started drinking at age 8.   Denies chest pain, palpitations, dyspnea, coughing, abdominal pain, diarrhea, urinary symptoms, arthralgias, myalgias, headache, vision changes. Rash has been ongoing for 1-1.5 months, itches, has tried cortisone cream with some relief. No known exposures. Denies history of similar rash or autoimmune disease such as psoriasis/psoriatic arthritis.       Allergies:  No Known Allergies    Problem List:    There are no active problems to display for this patient.       Past Medical History:    Past Medical History:   Diagnosis Date     ADHD 2008       Past Surgical History:    Past Surgical History:   Procedure Laterality Date     ORTHOPEDIC SURGERY         Family History:    No family history on file.    Social History:  Marital Status:  Single [1]  Social History     Tobacco Use     Smoking status: Current Every Day Smoker   Substance Use Topics     Alcohol use: Yes     Drug use: No        Medications:         potassium chloride ER (K-TAB) 20 MEQ CR tablet       predniSONE (DELTASONE) 20 MG tablet       diazepam (VALIUM) 5 MG tablet       famotidine (PEPCID) 40 MG tablet          Review of Systems   Constitutional: Positive for fatigue (since march). Negative for chills and fever.   Respiratory: Negative for cough and shortness of " breath.    Cardiovascular: Negative for chest pain and palpitations.   Gastrointestinal: Positive for nausea and vomiting. Negative for abdominal pain and diarrhea.   Genitourinary: Negative for difficulty urinating, dysuria and hematuria.   Musculoskeletal: Negative for arthralgias and myalgias.   Skin: Positive for rash.   Neurological: Positive for weakness (generalized weakness since march). Negative for dizziness, light-headedness and headaches.       Physical Exam   BP: 135/94  Pulse: 115  Temp: 97.8  F (36.6  C)  Resp: 22  SpO2: 94 %      Physical Exam  Constitutional:       General: He is not in acute distress.     Appearance: He is not ill-appearing or toxic-appearing.      Comments: Intoxicated   HENT:      Head: Normocephalic and atraumatic. No raccoon eyes or Andrew's sign.      Right Ear: Tympanic membrane, ear canal and external ear normal. No hemotympanum.      Left Ear: Tympanic membrane, ear canal and external ear normal. No hemotympanum.      Nose: Nose normal.      Mouth/Throat:      Lips: Pink.      Mouth: Mucous membranes are moist.      Pharynx: Oropharynx is clear. Uvula midline.   Eyes:      General: Lids are normal.      Extraocular Movements: Extraocular movements intact.      Conjunctiva/sclera: Conjunctivae normal.      Pupils: Pupils are equal, round, and reactive to light.   Cardiovascular:      Rate and Rhythm: Normal rate and regular rhythm.      Heart sounds: S1 normal and S2 normal. No murmur. No friction rub. No gallop.    Pulmonary:      Effort: Pulmonary effort is normal.      Breath sounds: Normal breath sounds. No wheezing, rhonchi or rales.   Abdominal:      General: Bowel sounds are normal. There is no distension.      Palpations: Abdomen is soft.      Tenderness: There is no abdominal tenderness. There is no guarding or rebound.   Musculoskeletal:      Comments: FROM of upper and lower extremities   Skin:     General: Skin is warm and dry.      Capillary Refill: Capillary  refill takes less than 2 seconds.      Findings: Rash present.   Neurological:      Mental Status: He is alert and oriented to person, place, and time.      GCS: GCS eye subscore is 4. GCS verbal subscore is 5. GCS motor subscore is 6.      Gait: Gait is intact.   Psychiatric:         Mood and Affect: Mood is anxious.         Speech: Speech normal.         Behavior: Behavior is agitated. Behavior is cooperative.           Eyelids- small erythematous with scaling    Abdomen- erythematous maculopapular rash    Right thigh- well demarcated erythematous maculopapular rash    Left thigh- erythematous maculopapular rash    Right hand- erythematous maculopapular rash, some scaling    Left hand- erythematous maculopapular rash    Right foot- erythematous maculopapular rash      ED Course     ED Course as of Dec 10 1917   Wed Dec 09, 2020   1722 Patient declines detox  Lizzette Bernard CNP on 12/9/2020 at 5:23 PM        1858 Patient updated on results and discharge instructions. Much more sober but continues to decline detox. Plans to call mom for ride home.  Lizzette Bernard CNP on 12/9/2020 at 6:58 PM          Procedures      Results for orders placed or performed during the hospital encounter of 12/09/20 (from the past 24 hour(s))   CBC with platelets differential   Result Value Ref Range    WBC 4.2 4.0 - 11.0 10e9/L    RBC Count 5.11 4.4 - 5.9 10e12/L    Hemoglobin 17.2 13.3 - 17.7 g/dL    Hematocrit 47.7 40.0 - 53.0 %    MCV 93 78 - 100 fl    MCH 33.7 (H) 26.5 - 33.0 pg    MCHC 36.1 31.5 - 36.5 g/dL    RDW 13.1 10.0 - 15.0 %    Platelet Count 299 150 - 450 10e9/L    Diff Method Automated Method     % Neutrophils 50.8 %    % Lymphocytes 30.1 %    % Monocytes 13.0 %    % Eosinophils 4.5 %    % Basophils 1.4 %    % Immature Granulocytes 0.2 %    Nucleated RBCs 0 0 /100    Absolute Neutrophil 2.1 1.6 - 8.3 10e9/L    Absolute Lymphocytes 1.3 0.8 - 5.3 10e9/L    Absolute Monocytes 0.6 0.0 - 1.3 10e9/L    Absolute  Eosinophils 0.2 0.0 - 0.7 10e9/L    Absolute Basophils 0.1 0.0 - 0.2 10e9/L    Abs Immature Granulocytes 0.0 0 - 0.4 10e9/L    Absolute Nucleated RBC 0.0    Comprehensive metabolic panel   Result Value Ref Range    Sodium 141 133 - 144 mmol/L    Potassium 2.9 (LL) 3.4 - 5.3 mmol/L    Chloride 100 94 - 109 mmol/L    Carbon Dioxide 31 20 - 32 mmol/L    Anion Gap 10 3 - 14 mmol/L    Glucose 101 (H) 70 - 99 mg/dL    Urea Nitrogen 6 (L) 7 - 30 mg/dL    Creatinine 0.65 (L) 0.66 - 1.25 mg/dL    GFR Estimate >90 >60 mL/min/[1.73_m2]    GFR Estimate If Black >90 >60 mL/min/[1.73_m2]    Calcium 8.4 (L) 8.5 - 10.1 mg/dL    Bilirubin Total 0.6 0.2 - 1.3 mg/dL    Albumin 3.7 3.4 - 5.0 g/dL    Protein Total 7.8 6.8 - 8.8 g/dL    Alkaline Phosphatase 85 40 - 150 U/L     (H) 0 - 70 U/L     (H) 0 - 45 U/L   Lipase   Result Value Ref Range    Lipase 140 73 - 393 U/L   Lactic acid whole blood   Result Value Ref Range    Lactic Acid 2.4 (H) 0.7 - 2.0 mmol/L   Alcohol ethyl   Result Value Ref Range    Ethanol g/dL 0.51 (H) 0.01 g/dL   UA reflex to Microscopic and Culture    Specimen: Midstream Urine   Result Value Ref Range    Color Urine Light Yellow     Appearance Urine Clear     Glucose Urine Negative NEG^Negative mg/dL    Bilirubin Urine Negative NEG^Negative    Ketones Urine Negative NEG^Negative mg/dL    Specific Gravity Urine 1.010 1.003 - 1.035    Blood Urine Negative NEG^Negative    pH Urine 7.0 4.7 - 8.0 pH    Protein Albumin Urine 10 (A) NEG^Negative mg/dL    Urobilinogen mg/dL Normal 0.0 - 2.0 mg/dL    Nitrite Urine Negative NEG^Negative    Leukocyte Esterase Urine Negative NEG^Negative    Source Midstream Urine     RBC Urine 0 0 - 2 /HPF    WBC Urine 0 0 - 5 /HPF    Bacteria Urine None (A) NEG^Negative /HPF    Squamous Epithelial /HPF Urine 0 0 - 1 /HPF   Urine Drugs of Abuse Screen Panel 13   Result Value Ref Range    Cannabinoids (84-ues-5-carboxy-9-THC) Detected, Abnormal Result (A) NDET^Not Detected ng/mL     Phencyclidine (Phencyclidine) Not Detected NDET^Not Detected ng/mL    Cocaine (Benzoylecgonine) Not Detected NDET^Not Detected ng/mL    Methamphetamine (d-Methamphetamine) Not Detected NDET^Not Detected ng/mL    Opiates (Morphine) Not Detected NDET^Not Detected ng/mL    Amphetamine (d-Amphetamine) Not Detected NDET^Not Detected ng/mL    Benzodiazepines (Nordiazepam) Detected, Abnormal Result (A) NDET^Not Detected ng/mL    Tricyclic Antidepressants (Desipramine) Not Detected NDET^Not Detected ng/mL    Methadone (Methadone) Not Detected NDET^Not Detected ng/mL    Barbiturates (Butalbital) Not Detected NDET^Not Detected ng/mL    Oxycodone (Oxycodone) Not Detected NDET^Not Detected ng/mL    Propoxyphene (Norpropoxyphene) Not Detected NDET^Not Detected ng/mL    Buprenorphine (Buprenorphine) Not Detected NDET^Not Detected ng/mL   Lactic acid whole blood   Result Value Ref Range    Lactic Acid 1.9 0.7 - 2.0 mmol/L   Potassium   Result Value Ref Range    Potassium 3.3 (L) 3.4 - 5.3 mmol/L       Medications   0.9% sodium chloride BOLUS (1,000 mLs Intravenous New Bag 12/9/20 1509)     Followed by   sodium chloride 0.9% infusion (has no administration in time range)   ondansetron (ZOFRAN) injection 4 mg (4 mg Intravenous Given 12/9/20 1506)   potassium chloride 10 mEq in 100 mL sterile water intermittent infusion (premix) (10 mEq Intravenous New Bag 12/9/20 1808)   diphenhydrAMINE (BENADRYL) injection 25 mg (25 mg Intravenous Not Given 12/9/20 1704)   potassium chloride ER (KLOR-CON M) CR tablet 40 mEq (40 mEq Oral Given 12/9/20 1610)   methylPREDNISolone sodium succinate (solu-MEDROL) injection 62.5 mg (62.5 mg Intravenous Given 12/9/20 1704)   diazepam (VALIUM) tablet 2 mg (2 mg Oral Given 12/9/20 1748)       Assessments & Plan (with Medical Decision Making)     I have reviewed the nursing notes.    I have reviewed the findings, diagnosis, plan and need for follow up with the patient.  (R21) Rash  (F10.20) Alcoholism  "/alcohol abuse (H)  (E87.6) Hypokalemia  (R11.2) Non-intractable vomiting with nausea, unspecified vomiting type  Intoxicated 30-year old male initially presents to the ED for evaluation of ongoing pruritic, maculopapular rash for 1+ month. While here he also states that he has had ongoing nausea and vomiting for several months and wants a \"full work-up.\" Work-up as above- he has no acute leukocytosis or anemia. He has normal renal functioning. Hypokalemia with potassium of 2.9. ALT/AST are elevated, total bili is normal. He has no abdominal tenderness and actually has not had any emesis during protracted observation in the ED. He is intoxicated with blood alcohol level of 0.51. Reports he drinks 1-1.5 liters of vodka daily. He declines detox. He denies depression, suicidal ideations. He is given solumedrol in the ED for pruitic maculopapular rash - differentials include lichens planus, psoriasis, others. Will discharge with oral prednisone and should follow-up with PCP and/or dermatology. He is given potassium 40 meQ PO and potassium 10 mEq IVPB x 2 doses in the ED for hypokalemia. Hypokalemia improved, will discharge home on potassium 20 mEq twice daily (AM/PM).   Recommend:  - START Prednisone 40 mg once daily WITH BREAKFAST (do not take on an empty stomach) x 4 days to help with rash  - START potassium chloride 20 mEq twice daily (AM/PM) x 7 days then re-check at follow-up with PCP  - STRONGLY recommend consideration of treatment for alcohol use. If you continue drinking at this pace your life span will be shortened significantly.   - Ensure you are drinking plenty of fluids        RETURN TO THE ED WITH NEW OR WORSENING SYMPTOMS.    ED FOLLOW-UP WITH YOUR PRIMARY CARE PROVIDER IN 5-7 DAYS.      Lizzette Bernard, CNP          New Prescriptions    POTASSIUM CHLORIDE ER (K-TAB) 20 MEQ CR TABLET    Take 1 tablet (20 mEq) by mouth 2 times daily for 7 days    PREDNISONE (DELTASONE) 20 MG TABLET    Take 2 tablets (40 " mg) by mouth daily for 4 days Take two tablets (= 40mg) each day for 5 (five) days       Final diagnoses:   Rash   Alcoholism /alcohol abuse (H)   Hypokalemia   Non-intractable vomiting with nausea, unspecified vomiting type       12/9/2020   [facility]     Lizzette Bernard CNP  12/10/20 1928

## 2020-12-09 NOTE — ED AVS SNAPSHOT
[facility]  [address] [address]  Phone: [phone]                                    Dax Villareal   MRN: [id number]    Department: [facility]   Date of Visit: 12/9/2020           After Visit Summary Signature Page    I have received my discharge instructions, and my questions have been answered. I have discussed any challenges I see with this plan with the nurse or doctor.    ..........................................................................................................................................  Patient/Patient Representative Signature      ..........................................................................................................................................  Patient Representative Print Name and Relationship to Patient    ..................................................               ................................................  Date                                   Time    ..........................................................................................................................................  Reviewed by Signature/Title    ...................................................              ..............................................  Date                                               Time          22EPIC Rev 08/18

## 2020-12-09 NOTE — ED NOTES
DATE:  12/9/2020   TIME OF RECEIPT FROM LAB:  1516  LAB TEST:  Lactic acid  LAB VALUE:  2.4  RESULTS GIVEN WITH READ-BACK TO [facility] LAB VALUE REPORTED TO PROVIDER:   1517

## 2020-12-09 NOTE — ED NOTES
Pt requested some food  Provider Ok'd pt to eat with a reg diet  Pt was given lunch box and ginger ale

## 2020-12-10 ASSESSMENT — ENCOUNTER SYMPTOMS
HEMATURIA: 0
ABDOMINAL PAIN: 0
COUGH: 0
DIZZINESS: 0
DIARRHEA: 0
MYALGIAS: 0
NAUSEA: 1
ARTHRALGIAS: 0
HEADACHES: 0
DIFFICULTY URINATING: 0
VOMITING: 1
CHILLS: 0
FATIGUE: 1
PALPITATIONS: 0
LIGHT-HEADEDNESS: 0
FEVER: 0
DYSURIA: 0
WEAKNESS: 1
SHORTNESS OF BREATH: 0

## 2020-12-10 NOTE — DISCHARGE INSTRUCTIONS
"(R21) Rash  (F10.20) Alcoholism /alcohol abuse (H)  (E87.6) Hypokalemia  (R11.2) Non-intractable vomiting with nausea, unspecified vomiting type  Intoxicated 30-year old male initially presents to the ED for evaluation of ongoing pruritic, maculopapular rash for 1+ month. While here he also states that he has had ongoing nausea and vomiting for several months and wants a \"full work-up.\" Work-up as above- he has no acute leukocytosis or anemia. He has normal renal functioning. Hypokalemia with potassium of 2.9. ALT/AST are elevated, total bili is normal. He has no abdominal tenderness and actually has not had any emesis during protracted observation in the ED. He is intoxicated with blood alcohol level of 0.51. Reports he drinks 1-1.5 liters of vodka daily. He declines detox. He denies depression, suicidal ideations. He is given solumedrol in the ED for pruitic maculopapular rash - differentials include lichens planus, psoriasis, others. Will discharge with oral prednisone and should follow-up with PCP and/or dermatology. He is given potassium 40 meQ PO and potassium 10 mEq IVPB x 2 doses in the ED for hypokalemia. Hypokalemia improved, will discharge home on potassium 20 mEq twice daily (AM/PM).   Recommend:  - START Prednisone 40 mg once daily WITH BREAKFAST (do not take on an empty stomach) x 4 days to help with rash  - START potassium chloride 20 mEq twice daily (AM/PM) x 7 days then re-check at follow-up with PCP  - STRONGLY recommend consideration of treatment for alcohol use. If you continue drinking at this pace your life span will be shortened significantly.   - Ensure you are drinking plenty of fluids        RETURN TO THE ED WITH NEW OR WORSENING SYMPTOMS.    ED FOLLOW-UP WITH YOUR PRIMARY CARE PROVIDER IN 5-7 DAYS.      Lizzette Bernard CNP    Results for orders placed or performed during the hospital encounter of 12/09/20   CBC with platelets differential     Status: Abnormal   Result Value Ref Range    " WBC 4.2 4.0 - 11.0 10e9/L    RBC Count 5.11 4.4 - 5.9 10e12/L    Hemoglobin 17.2 13.3 - 17.7 g/dL    Hematocrit 47.7 40.0 - 53.0 %    MCV 93 78 - 100 fl    MCH 33.7 (H) 26.5 - 33.0 pg    MCHC 36.1 31.5 - 36.5 g/dL    RDW 13.1 10.0 - 15.0 %    Platelet Count 299 150 - 450 10e9/L    Diff Method Automated Method     % Neutrophils 50.8 %    % Lymphocytes 30.1 %    % Monocytes 13.0 %    % Eosinophils 4.5 %    % Basophils 1.4 %    % Immature Granulocytes 0.2 %    Nucleated RBCs 0 0 /100    Absolute Neutrophil 2.1 1.6 - 8.3 10e9/L    Absolute Lymphocytes 1.3 0.8 - 5.3 10e9/L    Absolute Monocytes 0.6 0.0 - 1.3 10e9/L    Absolute Eosinophils 0.2 0.0 - 0.7 10e9/L    Absolute Basophils 0.1 0.0 - 0.2 10e9/L    Abs Immature Granulocytes 0.0 0 - 0.4 10e9/L    Absolute Nucleated RBC 0.0    Comprehensive metabolic panel     Status: Abnormal   Result Value Ref Range    Sodium 141 133 - 144 mmol/L    Potassium 2.9 (LL) 3.4 - 5.3 mmol/L    Chloride 100 94 - 109 mmol/L    Carbon Dioxide 31 20 - 32 mmol/L    Anion Gap 10 3 - 14 mmol/L    Glucose 101 (H) 70 - 99 mg/dL    Urea Nitrogen 6 (L) 7 - 30 mg/dL    Creatinine 0.65 (L) 0.66 - 1.25 mg/dL    GFR Estimate >90 >60 mL/min/[1.73_m2]    GFR Estimate If Black >90 >60 mL/min/[1.73_m2]    Calcium 8.4 (L) 8.5 - 10.1 mg/dL    Bilirubin Total 0.6 0.2 - 1.3 mg/dL    Albumin 3.7 3.4 - 5.0 g/dL    Protein Total 7.8 6.8 - 8.8 g/dL    Alkaline Phosphatase 85 40 - 150 U/L     (H) 0 - 70 U/L     (H) 0 - 45 U/L   Lipase     Status: None   Result Value Ref Range    Lipase 140 73 - 393 U/L   Lactic acid whole blood     Status: Abnormal   Result Value Ref Range    Lactic Acid 2.4 (H) 0.7 - 2.0 mmol/L   Alcohol ethyl     Status: Abnormal   Result Value Ref Range    Ethanol g/dL 0.51 (H) 0.01 g/dL   UA reflex to Microscopic and Culture     Status: Abnormal    Specimen: Midstream Urine   Result Value Ref Range    Color Urine Light Yellow     Appearance Urine Clear     Glucose Urine Negative  NEG^Negative mg/dL    Bilirubin Urine Negative NEG^Negative    Ketones Urine Negative NEG^Negative mg/dL    Specific Gravity Urine 1.010 1.003 - 1.035    Blood Urine Negative NEG^Negative    pH Urine 7.0 4.7 - 8.0 pH    Protein Albumin Urine 10 (A) NEG^Negative mg/dL    Urobilinogen mg/dL Normal 0.0 - 2.0 mg/dL    Nitrite Urine Negative NEG^Negative    Leukocyte Esterase Urine Negative NEG^Negative    Source Midstream Urine     RBC Urine 0 0 - 2 /HPF    WBC Urine 0 0 - 5 /HPF    Bacteria Urine None (A) NEG^Negative /HPF    Squamous Epithelial /HPF Urine 0 0 - 1 /HPF   Urine Drugs of Abuse Screen Panel 13     Status: Abnormal   Result Value Ref Range    Cannabinoids (87-mys-6-carboxy-9-THC) Detected, Abnormal Result (A) NDET^Not Detected ng/mL    Phencyclidine (Phencyclidine) Not Detected NDET^Not Detected ng/mL    Cocaine (Benzoylecgonine) Not Detected NDET^Not Detected ng/mL    Methamphetamine (d-Methamphetamine) Not Detected NDET^Not Detected ng/mL    Opiates (Morphine) Not Detected NDET^Not Detected ng/mL    Amphetamine (d-Amphetamine) Not Detected NDET^Not Detected ng/mL    Benzodiazepines (Nordiazepam) Detected, Abnormal Result (A) NDET^Not Detected ng/mL    Tricyclic Antidepressants (Desipramine) Not Detected NDET^Not Detected ng/mL    Methadone (Methadone) Not Detected NDET^Not Detected ng/mL    Barbiturates (Butalbital) Not Detected NDET^Not Detected ng/mL    Oxycodone (Oxycodone) Not Detected NDET^Not Detected ng/mL    Propoxyphene (Norpropoxyphene) Not Detected NDET^Not Detected ng/mL    Buprenorphine (Buprenorphine) Not Detected NDET^Not Detected ng/mL   Lactic acid whole blood     Status: None   Result Value Ref Range    Lactic Acid 1.9 0.7 - 2.0 mmol/L   Potassium     Status: Abnormal   Result Value Ref Range    Potassium 3.3 (L) 3.4 - 5.3 mmol/L

## 2020-12-10 NOTE — ED NOTES
"Reviewed discharge instructions with patient. 2 Rx's have been e-scribed to pharmacy of choice.  Multiple times reviewing discharge instructions pt was asking to have the provider order some valium or xanax at home.  I spoke with flako and she reported she is not treating him outpatient with his ETOH still increased.  Offered detox to patient and he refused.  Pt then wanted to have another ED provider see him and to get some Valium.  Informed pt that he has been evaluated and everything emergent has been ruled out.  Pt reported that he doesn't know how to get his meds, I informed him to contacted them tomorrow and set something up.  \"So your just gonna throw me out within a few minutes\"  Informed pt again that he had a thorough work up and everything emergent has been ruled out. Instructed pt to f/u with PCP, pt stated he doesn't have one . Informed pt that I will send a request into our  to help set up a PCP for the patient. Offered wc to patient and he refused, pt ambulated out of the ED indep and steady on feet.    "

## 2020-12-16 ENCOUNTER — TELEPHONE (OUTPATIENT)
Dept: FAMILY MEDICINE | Facility: OTHER | Age: 30
End: 2020-12-16

## 2020-12-16 NOTE — TELEPHONE ENCOUNTER
Pt calling and wants a refill on Prednisone for his rash.This was prescribed by ED MD. Updated no PCP here.Need establish care.Advise if needed go to ED for rash.He states that he needs more Prednisone as it starts to help.He took his last one yesterday.He declined ED as he states he was just there.    Patient would like to Establish care and asap. He states he will call back with who he wants as MD to establish care.    Any other recommendations?      Sandra Carney RN

## 2020-12-17 PROBLEM — E66.9 OBESITY: Status: ACTIVE | Noted: 2020-12-17

## 2020-12-17 PROBLEM — F10.930 ALCOHOL WITHDRAWAL SYNDROME WITHOUT COMPLICATION (H): Status: ACTIVE | Noted: 2019-11-04

## 2020-12-17 NOTE — TELEPHONE ENCOUNTER
Placed patient on Dr. Roman's schedule if Dr. Tim is unable to refill request. Attempted to reach patient - No VM.

## 2020-12-29 NOTE — PROGRESS NOTES
"Subjective     Dax Villareal is a 30 year old male who presents to clinic today for the following health issues:    HPI         New Patient/Transfer of Care - [place] - none x 10-15.    Works as .    Alcohol - strong family history.    Vodka - drink of choice.  6-7 days per week, 1/3 of 175 liter. (told ER 1/2 recently).  Nausea, vomiting, loss of appetite when cuts back on alcohol.  States Valium helps with this.  Has gone weeks without in the past.  No prior treatment - outpatient or inpatient.  Lives with mom, age 65.  Mom was on Xanax.  States it has gotten really bad since covid.  Was living with Ashley, who is also an alcoholic, and so \"had to\" drink with her.  Now with mom.    Prior ADHD diagnosis.  Denies other mental health diagnoses.    Rash - onset 3/2020.  Diffuse - trunk, legs, hands.  No face/scalp/back.   Has used a steroid cream and prednisone in ER 3/2020.  Symptoms included \"tightness, itching\".  No pain.  Tender.  Family history negative for dermatology.    Elevated liver enzymes -   Declines hepatitis screen.  US in ER with fatty liver.      Review of Systems   Constitutional, HEENT, cardiovascular, pulmonary, gi and gu systems are negative, except as otherwise noted.      Objective    /88 (BP Location: Right arm, Patient Position: Sitting, Cuff Size: Adult Regular)   Pulse 112   Temp 97.1  F (36.2  C) (Tympanic)   Resp 18   Ht 1.689 m (5' 6.5\")   Wt 85.7 kg (189 lb)   SpO2 94%   BMI 30.05 kg/m    Body mass index is 30.05 kg/m .  Physical Exam   GENERAL: alert and no distress  EYES: Eyes grossly normal to inspection, PERRL and conjunctivae and sclerae normal  NECK: no adenopathy, no asymmetry, masses, or scars and thyroid normal to palpation  RESP: lungs clear to auscultation - no rales, rhonchi or wheezes  CV: regular rate and rhythm, normal S1 S2, no S3 or S4, no murmur, click or rub, no peripheral edema and peripheral pulses strong  ABDOMEN: soft, nontender, no " "organomegaly or masses and bowel sounds normal  MS: no gross musculoskeletal defects noted, no edema  PSYCH: mentation appears normal and affect flat    Results for orders placed or performed in visit on 01/11/21 (from the past 24 hour(s))   Comprehensive metabolic panel (BMP + Alb, Alk Phos, ALT, AST, Total. Bili, TP)   Result Value Ref Range    Sodium 136 133 - 144 mmol/L    Potassium 3.1 (L) 3.4 - 5.3 mmol/L    Chloride 95 94 - 109 mmol/L    Carbon Dioxide 31 20 - 32 mmol/L    Anion Gap 10 3 - 14 mmol/L    Glucose 106 (H) 70 - 99 mg/dL    Urea Nitrogen 6 (L) 7 - 30 mg/dL    Creatinine 0.70 0.66 - 1.25 mg/dL    GFR Estimate >90 >60 mL/min/[1.73_m2]    GFR Estimate If Black >90 >60 mL/min/[1.73_m2]    Calcium 9.1 8.5 - 10.1 mg/dL    Bilirubin Total 0.7 0.2 - 1.3 mg/dL    Albumin 3.7 3.4 - 5.0 g/dL    Protein Total 7.9 6.8 - 8.8 g/dL    Alkaline Phosphatase 87 40 - 150 U/L     (H) 0 - 70 U/L     (H) 0 - 45 U/L   Alcohol ethyl   Result Value Ref Range    Ethanol g/dL 0.26 (H) 0.01 g/dL       Recent ER records reviewed.   Alcohol leves 0.38 and 0.51 in past month.    Assessment & Plan     Encounter for medical examination to establish care  No prior PCP in over a decade.    Psoriasis  Suspect rash is psoriasis.  Less likely fungal.  Given diffuse nature, difficult to use topical agents alone.   Dermatology referral.     Rash  As above.   Will try cream to focal area and assess response.  - DERMATOLOGY ADULT REFERRAL; Future  - clotrimazole-betamethasone (LOTRISONE) 1-0.05 % external lotion; Apply topically 2 times daily    Alcoholism (H)  Patient has very limited insight.  Started visit by stating he drinks \"a bit\".    States he wants to cut back to 30% or so.  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  Discussed implications of alcoholism on his overall health - short term and long term, effects on kidneys, liver, GI system, bleeding risk, neurologic " "effects, etc.  Level today redone.  Will recheck liver enzymes.  Did [facility] referral and given list of resources.  Did not prescribe benzodiazepines, which he was requesting - Valium.  Discussed safety risk.  Discussed need for detox.  - Alcohol ethyl  - MENTAL HEALTH REFERRAL  - Adult; Outpatient Treatment; Behavioral Health Home; Range: [facility] [phone]; We will contact you to schedule the appointment or please call with any questions    Elevated liver enzymes  As above-alcohol related and fatty liver.  Declined hepatitis serology.  Repeat levels today.  - Comprehensive metabolic panel (BMP + Alb, Alk Phos, ALT, AST, Total. Bili, TP)    Hypokalemia  Recheck today.  - Comprehensive metabolic panel (BMP + Alb, Alk Phos, ALT, AST, Total. Bili, TP)    Nausea and vomiting, intractability of vomiting not specified, unspecified vomiting type  As above.   PRN Zofran.  - ondansetron (ZOFRAN) 4 MG tablet; Take 1 tablet (4 mg) by mouth every 8 hours as needed for nausea     Tobacco Cessation:   reports that he has been smoking. He has never used smokeless tobacco.        BMI:   Estimated body mass index is 30.05 kg/m  as calculated from the following:    Height as of this encounter: 1.689 m (5' 6.5\").    Weight as of this encounter: 85.7 kg (189 lb).   Weight management plan: Discussed healthy diet and exercise guidelines       Will call with lab results.  Referral to dermatology.  Apply topical steroid antifungal lotion to select area twice daily for 2 weeks.  Be consistent.   Referral to Behavioral Health Home for treatment and support options.  Zofran for nausea as needed.    Results returned after completion of patient visit.  Unclear if patient drove himself to appointment today.  Over legal limit.  Reached out to social work, Alcira Phelan.  She will contact [place] [place] [employer] and patient regarding saftey concern.    See Patient Instructions    Over 35 min spent on patient visit - patient counseling, " exam, chart review, and documentation.      Brianna Burks MD  [facility]

## 2021-01-11 ENCOUNTER — TELEPHONE (OUTPATIENT)
Dept: FAMILY MEDICINE | Facility: OTHER | Age: 31
End: 2021-01-11

## 2021-01-11 ENCOUNTER — OFFICE VISIT (OUTPATIENT)
Dept: FAMILY MEDICINE | Facility: OTHER | Age: 31
End: 2021-01-11
Attending: FAMILY MEDICINE

## 2021-01-11 ENCOUNTER — TELEPHONE (OUTPATIENT)
Dept: CASE MANAGEMENT | Facility: HOSPITAL | Age: 31
End: 2021-01-11

## 2021-01-11 VITALS
RESPIRATION RATE: 18 BRPM | BODY MASS INDEX: 29.66 KG/M2 | OXYGEN SATURATION: 94 % | WEIGHT: 189 LBS | SYSTOLIC BLOOD PRESSURE: 132 MMHG | DIASTOLIC BLOOD PRESSURE: 88 MMHG | TEMPERATURE: 97.1 F | HEIGHT: 67 IN | HEART RATE: 112 BPM

## 2021-01-11 DIAGNOSIS — L40.9 PSORIASIS: ICD-10-CM

## 2021-01-11 DIAGNOSIS — K70.10 ALCOHOLIC HEPATITIS WITHOUT ASCITES (H): ICD-10-CM

## 2021-01-11 DIAGNOSIS — E87.6 HYPOKALEMIA: ICD-10-CM

## 2021-01-11 DIAGNOSIS — R21 RASH: ICD-10-CM

## 2021-01-11 DIAGNOSIS — R74.8 ELEVATED LIVER ENZYMES: ICD-10-CM

## 2021-01-11 DIAGNOSIS — Z00.00 ENCOUNTER FOR MEDICAL EXAMINATION TO ESTABLISH CARE: Primary | ICD-10-CM

## 2021-01-11 DIAGNOSIS — F10.20 ALCOHOLISM (H): ICD-10-CM

## 2021-01-11 DIAGNOSIS — R11.2 NAUSEA AND VOMITING, INTRACTABILITY OF VOMITING NOT SPECIFIED, UNSPECIFIED VOMITING TYPE: ICD-10-CM

## 2021-01-11 LAB
ALBUMIN SERPL-MCNC: 3.7 G/DL (ref 3.4–5)
ALP SERPL-CCNC: 87 U/L (ref 40–150)
ALT SERPL W P-5'-P-CCNC: 221 U/L (ref 0–70)
ANION GAP SERPL CALCULATED.3IONS-SCNC: 10 MMOL/L (ref 3–14)
AST SERPL W P-5'-P-CCNC: 243 U/L (ref 0–45)
BILIRUB SERPL-MCNC: 0.7 MG/DL (ref 0.2–1.3)
BUN SERPL-MCNC: 6 MG/DL (ref 7–30)
CALCIUM SERPL-MCNC: 9.1 MG/DL (ref 8.5–10.1)
CHLORIDE SERPL-SCNC: 95 MMOL/L (ref 94–109)
CO2 SERPL-SCNC: 31 MMOL/L (ref 20–32)
CREAT SERPL-MCNC: 0.7 MG/DL (ref 0.66–1.25)
ETHANOL SERPL-MCNC: 0.26 G/DL
GFR SERPL CREATININE-BSD FRML MDRD: >90 ML/MIN/{1.73_M2}
GLUCOSE SERPL-MCNC: 106 MG/DL (ref 70–99)
POTASSIUM SERPL-SCNC: 3.1 MMOL/L (ref 3.4–5.3)
PROT SERPL-MCNC: 7.9 G/DL (ref 6.8–8.8)
SODIUM SERPL-SCNC: 136 MMOL/L (ref 133–144)

## 2021-01-11 PROCEDURE — 36415 COLL VENOUS BLD VENIPUNCTURE: CPT | Performed by: FAMILY MEDICINE

## 2021-01-11 PROCEDURE — 82077 ASSAY SPEC XCP UR&BREATH IA: CPT | Performed by: FAMILY MEDICINE

## 2021-01-11 PROCEDURE — 80053 COMPREHEN METABOLIC PANEL: CPT | Performed by: FAMILY MEDICINE

## 2021-01-11 PROCEDURE — 99214 OFFICE O/P EST MOD 30 MIN: CPT | Performed by: FAMILY MEDICINE

## 2021-01-11 RX ORDER — CLOTRIMAZOLE AND BETAMETHASONE DIPROPIONATE 10; .5 MG/ML; MG/ML
LOTION TOPICAL 2 TIMES DAILY
Qty: 30 ML | Refills: 1 | Status: SHIPPED | OUTPATIENT
Start: 2021-01-11 | End: 2021-02-09

## 2021-01-11 RX ORDER — ONDANSETRON 4 MG/1
4 TABLET, FILM COATED ORAL EVERY 8 HOURS PRN
Qty: 20 TABLET | Refills: 0 | Status: ON HOLD | OUTPATIENT
Start: 2021-01-11 | End: 2021-06-29

## 2021-01-11 RX ORDER — POTASSIUM CHLORIDE 1500 MG/1
20 TABLET, EXTENDED RELEASE ORAL DAILY
Qty: 30 TABLET | Refills: 0 | Status: ON HOLD | OUTPATIENT
Start: 2021-01-11 | End: 2021-06-29

## 2021-01-11 ASSESSMENT — MIFFLIN-ST. JEOR: SCORE: 1762.99

## 2021-01-11 ASSESSMENT — PAIN SCALES - GENERAL: PAINLEVEL: SEVERE PAIN (7)

## 2021-01-11 NOTE — Clinical Note
Questions- above legal limit today.  Came back after he was gone.  Not aware if he was driving.  Is there a way to report concerns to DMV?  Or anything else I should do?   Thank you!

## 2021-01-11 NOTE — LETTER
January 14, 2021      Dax ROB Mono  [address]  [place] [address]        Dear ,    We are writing to inform you of your test results after multiple failed attempts to reach you via telephone.    Your results show that your potassium is still low.  So there was a daily supplement sent to your pharmacy.   Also, liver enzymes further elevated.  Likely alcohol related.  Your alcohol level was at 0.26 on the day of lab draw, detox advised.   There was a referral placed to [facility] to reach out to patient for treatment options as well.  Please call with any questions at [phone]  Eunice Evans LPN      Resulted Orders   Comprehensive metabolic panel (BMP + Alb, Alk Phos, ALT, AST, Total. Bili, TP)   Result Value Ref Range    Sodium 136 133 - 144 mmol/L    Potassium 3.1 (L) 3.4 - 5.3 mmol/L    Chloride 95 94 - 109 mmol/L    Carbon Dioxide 31 20 - 32 mmol/L    Anion Gap 10 3 - 14 mmol/L    Glucose 106 (H) 70 - 99 mg/dL    Urea Nitrogen 6 (L) 7 - 30 mg/dL    Creatinine 0.70 0.66 - 1.25 mg/dL    GFR Estimate >90 >60 mL/min/[1.73_m2]      Comment:      Non  GFR Calc  Starting 12/18/2018, serum creatinine based estimated GFR (eGFR) will be   calculated using the Chronic Kidney Disease Epidemiology Collaboration   (CKD-EPI) equation.      GFR Estimate If Black >90 >60 mL/min/[1.73_m2]      Comment:       GFR Calc  Starting 12/18/2018, serum creatinine based estimated GFR (eGFR) will be   calculated using the Chronic Kidney Disease Epidemiology Collaboration   (CKD-EPI) equation.      Calcium 9.1 8.5 - 10.1 mg/dL    Bilirubin Total 0.7 0.2 - 1.3 mg/dL    Albumin 3.7 3.4 - 5.0 g/dL    Protein Total 7.9 6.8 - 8.8 g/dL    Alkaline Phosphatase 87 40 - 150 U/L     (H) 0 - 70 U/L     (H) 0 - 45 U/L   Alcohol ethyl   Result Value Ref Range    Ethanol g/dL 0.26 (H) 0.01 g/dL       If you have any questions or concerns, please call the clinic at the number listed above.        Sincerely,      Brianna Tim MD

## 2021-01-11 NOTE — NURSING NOTE
"Chief Complaint   Patient presents with     Establish Care       Initial /88 (BP Location: Right arm, Patient Position: Sitting, Cuff Size: Adult Regular)   Pulse 112   Temp 97.1  F (36.2  C) (Tympanic)   Resp 18   Ht 1.689 m (5' 6.5\")   Wt 85.7 kg (189 lb)   SpO2 94%   BMI 30.05 kg/m   Estimated body mass index is 30.05 kg/m  as calculated from the following:    Height as of this encounter: 1.689 m (5' 6.5\").    Weight as of this encounter: 85.7 kg (189 lb).  Medication Reconciliation: complete  Dunia Goldstein LPN  "

## 2021-01-11 NOTE — TELEPHONE ENCOUNTER
Care Transitions focused note:      Called patient to follow-up on his appointment from this morning.  Patient got a ride to the appointment as he does not own a car or have a license.  Let him know he would be receiving a call for alcohol treatment and to schedule a dermatology appointment.  Also offered assistance if patient needed help with MA application

## 2021-01-11 NOTE — PATIENT INSTRUCTIONS
Will call with lab results.  Referral to dermatology.  Apply topical steroid antifungal lotion to select area twice daily for 2 weeks.  Be consistent.   Referral to [facility] for treatment and support options.  Zofran for nausea as needed.       Psychologists/ Counselors      [place]  [place]   [phone]  [facility]   [phone]  [facility] [phone]  [facility] (kids) [phone]  [facility](teens) [phone]  [facility]  [phone]  [facility]  [phone]  [facility]  [phone]  [facility]  [phone]  [facility]  [phone]  [facility] counseling [phone]   [facility]   [phone]   [facility]    [phone]   [facility]   [phone]   ([place])   [facility]  [phone]  [facility] [phone]  [facility]  [phone]  [facility]  [phone]     [facility] [phone]      [place]  [facility] [phone]  [facility] counseling [phone]  [facility]   [phone]  [facility] (Abdirashid Mathis LMFT)                                                   [phone]  Pia Rankin  [phone]  [facility] [phone]  [facility]      [phone]  [facility]       [phone]  [facility]  [phone]  [facility]      [phone]     Re[facility]   [phone]  [facility]      [phone]  [facility] ROB Alfredo [phone]  [facility]      [phone]  [facility]  [phone]    [facility]                      [phone]    *Facilities in bold italics indicate medication management  Services are offered.    Crisis support  Mobile crisis line- [phone]  [facility] Range: Free online resources including  therapy for Mental Health and substance use problems: Http://thriverange.org    Crisis Text Line  Text HOME to 538-333 - The Crisis Text Line serves anyone, in any type of crisis, providing access to free, 24/7 support and information via the medium people already use and trust  http://www.crisistextline.org   Here's how it works:  Text HOME to 882-764 from anywhere in the [place], anytime, about any type of crisis.  A live, trained Crisis Counselor receives the text and responds quickly.  The volunteer Crisis Counselor will help you move from a 'hot moment to a cool moment'

## 2021-01-18 ENCOUNTER — TELEPHONE (OUTPATIENT)
Dept: FAMILY MEDICINE | Facility: OTHER | Age: 31
End: 2021-01-18

## 2021-01-18 DIAGNOSIS — L40.9 PSORIASIS: Primary | ICD-10-CM

## 2021-01-18 NOTE — TELEPHONE ENCOUNTER
"Pt called regarding lab results from 1/11. Pt was informed of results and verbalizes understanding. Pt states \"Everybody is drinking (alcohol). It's a pandemic.\"     Pt states that cream prescribed was $80 and he cannot afford this. Any suggested alternatives or f/u with derm?  "

## 2021-01-19 RX ORDER — TRIAMCINOLONE ACETONIDE 1 MG/ML
LOTION TOPICAL 3 TIMES DAILY
Qty: 60 ML | Refills: 1 | Status: SHIPPED | OUTPATIENT
Start: 2021-01-19 | End: 2021-02-09

## 2021-01-19 NOTE — TELEPHONE ENCOUNTER
Incorrect.  His alcohol levels have been above the legal limit on multiple occasions.  His alcohol intake cannot be explained by the pandemic alone.  He is in need of treatment to help control his alcoholism.    If wanting referrals, we can assist.    Will try sending separate steroid cream only.  Should keep appointment with dermatology.

## 2021-02-02 ENCOUNTER — TELEPHONE (OUTPATIENT)
Dept: BEHAVIORAL HEALTH | Facility: OTHER | Age: 31
End: 2021-02-02

## 2021-02-09 ENCOUNTER — HOSPITAL ENCOUNTER (EMERGENCY)
Facility: OTHER | Age: 31
Discharge: HOME | End: 2021-02-09
Attending: STUDENT IN AN ORGANIZED HEALTH CARE EDUCATION/TRAINING PROGRAM | Admitting: STUDENT IN AN ORGANIZED HEALTH CARE EDUCATION/TRAINING PROGRAM
Payer: MEDICAID

## 2021-02-09 VITALS
WEIGHT: 190 LBS | BODY MASS INDEX: 30.53 KG/M2 | SYSTOLIC BLOOD PRESSURE: 139 MMHG | HEIGHT: 66 IN | RESPIRATION RATE: 20 BRPM | DIASTOLIC BLOOD PRESSURE: 92 MMHG | TEMPERATURE: 96.5 F | OXYGEN SATURATION: 95 % | HEART RATE: 120 BPM

## 2021-02-09 DIAGNOSIS — R21 RASH: ICD-10-CM

## 2021-02-09 DIAGNOSIS — F10.920 ALCOHOLIC INTOXICATION WITHOUT COMPLICATION (H): ICD-10-CM

## 2021-02-09 PROCEDURE — 99284 EMERGENCY DEPT VISIT MOD MDM: CPT | Performed by: STUDENT IN AN ORGANIZED HEALTH CARE EDUCATION/TRAINING PROGRAM

## 2021-02-09 PROCEDURE — 250N000011 HC RX IP 250 OP 636: Performed by: STUDENT IN AN ORGANIZED HEALTH CARE EDUCATION/TRAINING PROGRAM

## 2021-02-09 PROCEDURE — 99283 EMERGENCY DEPT VISIT LOW MDM: CPT | Performed by: STUDENT IN AN ORGANIZED HEALTH CARE EDUCATION/TRAINING PROGRAM

## 2021-02-09 RX ORDER — ONDANSETRON 4 MG/1
4 TABLET, ORALLY DISINTEGRATING ORAL ONCE
Status: COMPLETED | OUTPATIENT
Start: 2021-02-09 | End: 2021-02-09

## 2021-02-09 RX ORDER — HYDROXYZINE HYDROCHLORIDE 25 MG/1
25-50 TABLET, FILM COATED ORAL EVERY 6 HOURS PRN
Qty: 50 TABLET | Refills: 0 | Status: ON HOLD | OUTPATIENT
Start: 2021-02-09 | End: 2021-06-29

## 2021-02-09 RX ORDER — BETAMETHASONE DIPROPIONATE 0.5 MG/G
OINTMENT, AUGMENTED TOPICAL 2 TIMES DAILY
Qty: 50 G | Refills: 0 | Status: ON HOLD | OUTPATIENT
Start: 2021-02-09 | End: 2021-06-29

## 2021-02-09 RX ADMIN — ONDANSETRON 4 MG: 4 TABLET, ORALLY DISINTEGRATING ORAL at 01:56

## 2021-02-09 ASSESSMENT — MIFFLIN-ST. JEOR: SCORE: 1759.58

## 2021-02-09 NOTE — TELEPHONE ENCOUNTER
MAI CC introduced and explained integrated health model, brief therapy interventions and referral/ support services for ongoing therapy interventions.  Discussed [facility] services, patient was not interested.  Presenting issue: Spoke to pt regarding referral for [facility] services and the services we provide.  Pt stated he was not interested.  Discussed pt's current alcohol usage, but pt stated he wanted to get his medical stuff figured out first.  Advised him to reach out to writer if this changes and ensured he had writer's contact info.    February 2, 2021  Jelena Swan CHRISTIANNE  Social Work Care Coordinator  [facility]  [phone] option 2 or [phone]

## 2021-02-09 NOTE — ED PROVIDER NOTES
"  History     Chief Complaint   Patient presents with     Rash     HPI  Dax Villareal is a 30 year old male with history of severe alcohol use disorder, obesity, and rash (see encounter in [facility] from 12/9/2020, photos included) who presents with rash. He reports a chronic rash, this has been present since March 2020; he did have a dermatology telehealth visit through [facility] on 4/14 and was diagnosed with dermatitis. Recently he has been evaluated through [facility] and clinics, diagnosed with likely psoriasis and started on Lotrisone and he initially stated he has tried this without success however on further discussion with patient it is unclear that he has been taking this; documentation supports that patient never filled prescription for lotrisone due to cost concerns and therefore he was started on triamcinolone, I am unable to confirm that he has filled that prescription at this time. Patient reports an itchy, red, diffuse rash on his arms, legs, abdomen, back, and to a small extent his face as well. He reports significant pain from this rash and has been drinking alcohol heavily to manage the pain. He states he does not want to take tylenol or ibuprofen for pain because he does not like taking extra medications. He denies any new fevers or chills, nausea or vomiting, chest pain, difficulty breathing, abdominal pain, or other complaints. He is requesting valium or xanax to help manage his symptoms; he states he took a friends flexeril prior to arrival. He reports he paid his friend $50 to drive him to [facility] from [place], stating he keeps getting \"kicked out\" of hospitals in [place] and he cannot find the help that he needs for this condition. He has a dermatology appointment scheduled for April 7th but does not want to wait that long.    Allergies:  No Known Allergies    Problem List:    Patient Active Problem List    Diagnosis Date Noted     Obesity 12/17/2020     Priority: Medium     Alcohol " "withdrawal syndrome without complication (H) 11/04/2019     Priority: Medium     Furuncle of skin or subcutaneous tissue 10/12/2011     Priority: Medium        Past Medical History:    Past Medical History:   Diagnosis Date     ADHD 2008       Past Surgical History:    Past Surgical History:   Procedure Laterality Date     ORTHOPEDIC SURGERY Right     fracture repair       Family History:    Family History   Problem Relation Age of Onset     Attention Deficit Disorder Mother      Alcoholism Mother      Alcoholism Father      Alcoholism Brother      Alcoholism Sister        Social History:  Marital Status:  Single [1]  Social History     Tobacco Use     Smoking status: Current Every Day Smoker     Smokeless tobacco: Never Used   Substance Use Topics     Alcohol use: Yes     Drug use: No        Medications:         augmented betamethasone dipropionate (DIPROLENE-AF) 0.05 % external ointment       hydrOXYzine (ATARAX) 25 MG tablet       ondansetron (ZOFRAN) 4 MG tablet       potassium chloride ER (K-TAB) 20 MEQ CR tablet          Review of Systems  10-point ROS complete and negative other than as noted in HPI above.    Physical Exam   BP: (!) 139/92  Pulse: 120  Temp: 96.5  F (35.8  C)  Resp: 22  Height: 167.6 cm (5' 6\")  Weight: 86.2 kg (190 lb)  SpO2: 96 %      Physical Exam   Gen: Lying in bed, no acute distress, intoxicated  HEENT: NC/AT, conjunctivae injected, PERRL, slightly dry mucous membranes  CV: Sinus tachycardia, appears warm and well-perfused  Pulm: CTAB, normal respiratory effort  Abd: Soft, NT, ND  Skin: Diffuse erythematous scaly plaques, with accentuated edges, most well-demarcated, involving proximal>distal lower extremities bilaterally, left>right upper extremities, and anterior abdomen, left lateral chest wall, and to a less extent the back and minimal facial involvement; see media tab in Epic for images, dated 2/9/2021  MSK: No gross deformities or swelling  Neuro: Alert, slurred speech, oriented " to self, place, and situation; CN II-XII grossly intact, no focal deficits  Psych: Poor insight and judgment, thought process tangential but redirectable, answers questions appropriately and accurately, often repeats himself  ED Course     ED Course as of Feb 09 0646   Tue Feb 09, 2021   0020 Patient evaluated, no labs or imaging, anticipate discharge      0151 Attempted to call mother Astrid to come  patient as he is intoxicated; went to voicemail and mailbox full so unable to leave a message      0152 Informed by RN that patient vomited; likely due to excessive alcohol use. Will give zofran and offer water, continue to monitor and work on ride for patient      0227 I spoke with patient again; he has very limited insight into his alcohol use, frequently repeats himself; he is fixated on the pain from his rash, states alcohol is the only treatment and he does not like to take any other pain medications; he frequently is requesting valium or xanax for treatment of his pain. I informed patient that these are not pain medications; patient seemed to express understanding but later brought up these medications again. Patient continues to demonstrate a level of intoxication that precludes safe discharge without a friend or family member coming to pick him up. He reports he has a good relationship with his primary doctor in [place], [place] and I encouraged him to follow up with her as well as dermatology. I informed patient that he no longer requires emergency cares and is appropriate for discharge; patient expressed frustrations around lack of treatment for his chronic skin condition, I expressed my understanding of his frustrations and that unfortunately we do not have the resources or expertise in the emergency department at this time to treat patient's chronic condition in the manner that he wishes, but follow-up and alcohol avoidance will be essential to enacting an appropriate therapy plan. Patient again declines  offer of detox, stating his alcohol use is not a primary problem for him. He will call his friend that dropped him off to see if he can come back and pick him up      0334 Patient remains unable to obtain a ride; on re-check he is asleep, snoring comfortably in bed        Procedures               Critical Care time:  none               No results found for this or any previous visit (from the past 24 hour(s)).    Medications   ondansetron (ZOFRAN-ODT) ODT tab 4 mg (4 mg Oral Given 2/9/21 0156)       Assessments & Plan (with Medical Decision Making)   30-year-old with history of severe alcohol use disorder, obesity, and chronic rash (likely psoriasis; see encounter in [facility] from 12/9/2020, also recent visits from 1/2021) who presents for evaluation of rash, with ongoing pain issues related to his rash, found also to be clinically intoxicated. Patient tachycardic on arrival, normotensive, afebrile. No evidence of alcohol withdrawal, patient clinically intoxicated; examined as above, rash appears to be similar to prior from photos obtained from visit 12/9/2020. I agree with recent assessment by patient's PCP that rash likely represents psoriasis; no evidence of bacterial super-infection to warrant antibiotic treatment. No evidence of mucosal involvement to suggest SJS, rash no typical for erythroderma, rash is chronic and no life-threatening etiology suspected, best managed on outpatient basis. Pertinent MDM and discussions with patient in ED course above; in brief will start patient on betamethasone 0.05% though this will likely be insufficient to completely resolve lesions given their extent; also hydroxyzine as needed for itching. Discussed patient's alcohol use disorder at length; he demonstrates limited insight into his condition and declined repeated offers of detox. He was allowed to sober here in the emergency department, slept comfortably throughout the night. Patient discharged to home in stable  condition with plan for close outpatient follow-up, careful ED return precautions provided.    From ED discharge instructions:  Your rash is likely caused by psoriasis; this is a skin condition that can affect the whole body and requires involvement of a dermatologist given how advanced your condition is. Start taking betamethasone (steroid) ointment as prescribed; apply twice daily to affected areas. Do NOT take betamethasone with any other steroid cream, ointment, or lotion.    Take ibuprofen 400 mg every 6 hours as needed for pain; always take ibuprofen 30 minutes after eating to avoid upset stomach.    Avoid tylenol while also using alcohol as this can severely affect your liver.    Take hydroxyzine as needed for itching.    I strongly recommend that you consider going to detox. Your alcohol use is a major problem and will only make the rash harder to treat.    Attempt to make a closer follow-up appointment with dermatology, in a different health system if needed. Discuss with your primary care provider about coordinating this and do follow-up with your primary provider in the next few days for a re-check.    Come back to the emergency department if new or worsening severe pain, fever greater than 101 degrees Fahrenheit, vomiting and inability to eat or drink, or any other acute concerns.      I have reviewed the nursing notes.    I have reviewed the findings, diagnosis, plan and need for follow up with the patient.       New Prescriptions    AUGMENTED BETAMETHASONE DIPROPIONATE (DIPROLENE-AF) 0.05 % EXTERNAL OINTMENT    Apply topically 2 times daily    HYDROXYZINE (ATARAX) 25 MG TABLET    Take 1-2 tablets (25-50 mg) by mouth every 6 hours as needed for itching       Final diagnoses:   Rash   Alcoholic intoxication without complication (H)       2/9/2021   [facility] Trevor Quick MD  02/09/21 1006

## 2021-02-09 NOTE — DISCHARGE INSTRUCTIONS
Your rash is likely caused by psoriasis; this is a skin condition that can affect the whole body and requires involvement of a dermatologist given how advanced your condition is. Start taking betamethasone (steroid) ointment as prescribed; apply twice daily to affected areas. Do NOT take betamethasone with any other steroid cream, ointment, or lotion.    Take ibuprofen 400 mg every 6 hours as needed for pain; always take ibuprofen 30 minutes after eating to avoid upset stomach.    Avoid tylenol while also using alcohol as this can severely affect your liver.    Take hydroxyzine as needed for itching.    I strongly recommend that you consider going to detox. Your alcohol use is a major problem and will only make the rash harder to treat.    Attempt to make a closer follow-up appointment with dermatology, in a different health system if needed. Discuss with your primary care provider about coordinating this and do follow-up with your primary provider in the next few days for a re-check.    Come back to the emergency department if new or worsening severe pain, fever greater than 101 degrees Fahrenheit, vomiting and inability to eat or drink, or any other acute concerns.

## 2021-02-09 NOTE — ED TRIAGE NOTES
Patient has rash, to bilateral legs, torso, report he has had rash for a while, reports he has been to [facility], 3 times reports he is supposed to follow up with dermatology in April.

## 2021-05-18 ENCOUNTER — APPOINTMENT (OUTPATIENT)
Dept: GENERAL RADIOLOGY | Facility: HOSPITAL | Age: 31
End: 2021-05-18
Attending: PHYSICIAN ASSISTANT
Payer: MEDICAID

## 2021-05-18 ENCOUNTER — HOSPITAL ENCOUNTER (EMERGENCY)
Facility: HOSPITAL | Age: 31
Discharge: ANOTHER HOSPITAL | End: 2021-05-18
Attending: PHYSICIAN ASSISTANT | Admitting: PHYSICIAN ASSISTANT
Payer: MEDICAID

## 2021-05-18 ENCOUNTER — ANESTHESIA (OUTPATIENT)
Dept: EMERGENCY MEDICINE | Facility: HOSPITAL | Age: 31
End: 2021-05-18
Payer: MEDICAID

## 2021-05-18 ENCOUNTER — TRANSFERRED RECORDS (OUTPATIENT)
Dept: HEALTH INFORMATION MANAGEMENT | Facility: CLINIC | Age: 31
End: 2021-05-18

## 2021-05-18 ENCOUNTER — ANESTHESIA EVENT (OUTPATIENT)
Dept: EMERGENCY MEDICINE | Facility: HOSPITAL | Age: 31
End: 2021-05-18
Payer: MEDICAID

## 2021-05-18 VITALS
DIASTOLIC BLOOD PRESSURE: 92 MMHG | TEMPERATURE: 98.1 F | WEIGHT: 186.3 LBS | OXYGEN SATURATION: 96 % | HEART RATE: 125 BPM | RESPIRATION RATE: 23 BRPM | SYSTOLIC BLOOD PRESSURE: 125 MMHG | BODY MASS INDEX: 30.07 KG/M2

## 2021-05-18 DIAGNOSIS — A41.9 SEPSIS, DUE TO UNSPECIFIED ORGANISM, UNSPECIFIED WHETHER ACUTE ORGAN DYSFUNCTION PRESENT (H): ICD-10-CM

## 2021-05-18 DIAGNOSIS — J98.8 AIRWAY COMPROMISE: ICD-10-CM

## 2021-05-18 LAB
ABO + RH BLD: NORMAL
ABO + RH BLD: NORMAL
ALBUMIN SERPL-MCNC: 2.3 G/DL (ref 3.4–5)
ALP SERPL-CCNC: 309 U/L (ref 40–150)
ALT SERPL W P-5'-P-CCNC: 95 U/L (ref 0–70)
ANION GAP SERPL CALCULATED.3IONS-SCNC: 15 MMOL/L (ref 3–14)
AST SERPL W P-5'-P-CCNC: 237 U/L (ref 0–45)
BASOPHILS # BLD AUTO: 0 10E9/L (ref 0–0.2)
BASOPHILS NFR BLD AUTO: 0.2 %
BILIRUB SERPL-MCNC: 8.2 MG/DL (ref 0.2–1.3)
BLD GP AB SCN SERPL QL: NORMAL
BLOOD BANK CMNT PATIENT-IMP: NORMAL
BUN SERPL-MCNC: 10 MG/DL (ref 7–30)
CALCIUM SERPL-MCNC: 13.4 MG/DL (ref 8.5–10.1)
CHLORIDE SERPL-SCNC: 70 MMOL/L (ref 94–109)
CO2 SERPL-SCNC: 42 MMOL/L (ref 20–32)
CREAT SERPL-MCNC: 0.82 MG/DL (ref 0.66–1.25)
DIFFERENTIAL METHOD BLD: ABNORMAL
EOSINOPHIL # BLD AUTO: 0 10E9/L (ref 0–0.7)
EOSINOPHIL NFR BLD AUTO: 0.3 %
ERYTHROCYTE [DISTWIDTH] IN BLOOD BY AUTOMATED COUNT: 17.3 % (ref 10–15)
ETHANOL SERPL-MCNC: 0.04 G/DL
GFR SERPL CREATININE-BSD FRML MDRD: >90 ML/MIN/{1.73_M2}
GLUCOSE SERPL-MCNC: 89 MG/DL (ref 70–99)
HCT VFR BLD AUTO: 44.8 % (ref 40–53)
HGB BLD-MCNC: 16.6 G/DL (ref 13.3–17.7)
IMM GRANULOCYTES # BLD: 0.2 10E9/L (ref 0–0.4)
IMM GRANULOCYTES NFR BLD: 1.2 %
LABORATORY COMMENT REPORT: NORMAL
LACTATE BLD-SCNC: 7.7 MMOL/L (ref 0.7–2)
LIPASE SERPL-CCNC: 381 U/L (ref 73–393)
LYMPHOCYTES # BLD AUTO: 0.6 10E9/L (ref 0.8–5.3)
LYMPHOCYTES NFR BLD AUTO: 4.3 %
MAGNESIUM SERPL-MCNC: 1.6 MG/DL (ref 1.6–2.3)
MCH RBC QN AUTO: 36.2 PG (ref 26.5–33)
MCHC RBC AUTO-ENTMCNC: 37.1 G/DL (ref 31.5–36.5)
MCV RBC AUTO: 98 FL (ref 78–100)
MONOCYTES # BLD AUTO: 0.4 10E9/L (ref 0–1.3)
MONOCYTES NFR BLD AUTO: 3.1 %
NEUTROPHILS # BLD AUTO: 12 10E9/L (ref 1.6–8.3)
NEUTROPHILS NFR BLD AUTO: 90.9 %
NRBC # BLD AUTO: 0.1 10*3/UL
NRBC BLD AUTO-RTO: 1 /100
PLATELET # BLD AUTO: 222 10E9/L (ref 150–450)
POTASSIUM SERPL-SCNC: 2 MMOL/L (ref 3.4–5.3)
PROCALCITONIN SERPL-MCNC: 1.65 NG/ML
PROT SERPL-MCNC: 7.3 G/DL (ref 6.8–8.8)
RBC # BLD AUTO: 4.58 10E12/L (ref 4.4–5.9)
SARS-COV-2 RNA RESP QL NAA+PROBE: NEGATIVE
SODIUM SERPL-SCNC: 127 MMOL/L (ref 133–144)
SPECIMEN EXP DATE BLD: NORMAL
SPECIMEN SOURCE: NORMAL
TROPONIN I SERPL-MCNC: 0.02 UG/L (ref 0–0.04)
WBC # BLD AUTO: 13.2 10E9/L (ref 4–11)

## 2021-05-18 PROCEDURE — 85025 COMPLETE CBC W/AUTO DIFF WBC: CPT | Performed by: PHYSICIAN ASSISTANT

## 2021-05-18 PROCEDURE — 87040 BLOOD CULTURE FOR BACTERIA: CPT | Mod: 59 | Performed by: PHYSICIAN ASSISTANT

## 2021-05-18 PROCEDURE — 71045 X-RAY EXAM CHEST 1 VIEW: CPT

## 2021-05-18 PROCEDURE — 94002 VENT MGMT INPAT INIT DAY: CPT

## 2021-05-18 PROCEDURE — 86901 BLOOD TYPING SEROLOGIC RH(D): CPT | Performed by: PHYSICIAN ASSISTANT

## 2021-05-18 PROCEDURE — 999N000157 HC STATISTIC RCP TIME EA 10 MIN

## 2021-05-18 PROCEDURE — 84484 ASSAY OF TROPONIN QUANT: CPT | Performed by: PHYSICIAN ASSISTANT

## 2021-05-18 PROCEDURE — C9803 HOPD COVID-19 SPEC COLLECT: HCPCS

## 2021-05-18 PROCEDURE — 96365 THER/PROPH/DIAG IV INF INIT: CPT | Mod: XU

## 2021-05-18 PROCEDURE — 31500 INSERT EMERGENCY AIRWAY: CPT

## 2021-05-18 PROCEDURE — 999N000158 HC STATISTIC RCP TIME ED VENT EA 10 MIN

## 2021-05-18 PROCEDURE — 250N000011 HC RX IP 250 OP 636: Performed by: PHYSICIAN ASSISTANT

## 2021-05-18 PROCEDURE — 96372 THER/PROPH/DIAG INJ SC/IM: CPT | Mod: XU | Performed by: PHYSICIAN ASSISTANT

## 2021-05-18 PROCEDURE — 86900 BLOOD TYPING SEROLOGIC ABO: CPT | Performed by: PHYSICIAN ASSISTANT

## 2021-05-18 PROCEDURE — 36415 COLL VENOUS BLD VENIPUNCTURE: CPT | Performed by: PHYSICIAN ASSISTANT

## 2021-05-18 PROCEDURE — 93005 ELECTROCARDIOGRAM TRACING: CPT

## 2021-05-18 PROCEDURE — U0003 INFECTIOUS AGENT DETECTION BY NUCLEIC ACID (DNA OR RNA); SEVERE ACUTE RESPIRATORY SYNDROME CORONAVIRUS 2 (SARS-COV-2) (CORONAVIRUS DISEASE [COVID-19]), AMPLIFIED PROBE TECHNIQUE, MAKING USE OF HIGH THROUGHPUT TECHNOLOGIES AS DESCRIBED BY CMS-2020-01-R: HCPCS | Performed by: PHYSICIAN ASSISTANT

## 2021-05-18 PROCEDURE — 93010 ELECTROCARDIOGRAM REPORT: CPT | Performed by: INTERNAL MEDICINE

## 2021-05-18 PROCEDURE — 84145 PROCALCITONIN (PCT): CPT | Performed by: PHYSICIAN ASSISTANT

## 2021-05-18 PROCEDURE — 96375 TX/PRO/DX INJ NEW DRUG ADDON: CPT | Mod: XU

## 2021-05-18 PROCEDURE — 82077 ASSAY SPEC XCP UR&BREATH IA: CPT | Performed by: PHYSICIAN ASSISTANT

## 2021-05-18 PROCEDURE — 83690 ASSAY OF LIPASE: CPT | Performed by: PHYSICIAN ASSISTANT

## 2021-05-18 PROCEDURE — 31500 INSERT EMERGENCY AIRWAY: CPT | Performed by: NURSE ANESTHETIST, CERTIFIED REGISTERED

## 2021-05-18 PROCEDURE — 250N000013 HC RX MED GY IP 250 OP 250 PS 637: Performed by: PHYSICIAN ASSISTANT

## 2021-05-18 PROCEDURE — 250N000009 HC RX 250

## 2021-05-18 PROCEDURE — 83605 ASSAY OF LACTIC ACID: CPT | Performed by: PHYSICIAN ASSISTANT

## 2021-05-18 PROCEDURE — U0005 INFEC AGEN DETEC AMPLI PROBE: HCPCS | Performed by: PHYSICIAN ASSISTANT

## 2021-05-18 PROCEDURE — 96367 TX/PROPH/DG ADDL SEQ IV INF: CPT

## 2021-05-18 PROCEDURE — 86850 RBC ANTIBODY SCREEN: CPT | Performed by: PHYSICIAN ASSISTANT

## 2021-05-18 PROCEDURE — 250N000011 HC RX IP 250 OP 636

## 2021-05-18 PROCEDURE — 99291 CRITICAL CARE FIRST HOUR: CPT | Mod: 25

## 2021-05-18 PROCEDURE — 258N000003 HC RX IP 258 OP 636: Performed by: PHYSICIAN ASSISTANT

## 2021-05-18 PROCEDURE — 99291 CRITICAL CARE FIRST HOUR: CPT | Performed by: PHYSICIAN ASSISTANT

## 2021-05-18 PROCEDURE — 250N000011 HC RX IP 250 OP 636: Performed by: NURSE ANESTHETIST, CERTIFIED REGISTERED

## 2021-05-18 PROCEDURE — 99292 CRITICAL CARE ADDL 30 MIN: CPT

## 2021-05-18 PROCEDURE — 83735 ASSAY OF MAGNESIUM: CPT | Performed by: PHYSICIAN ASSISTANT

## 2021-05-18 PROCEDURE — 80053 COMPREHEN METABOLIC PANEL: CPT | Performed by: PHYSICIAN ASSISTANT

## 2021-05-18 PROCEDURE — 94640 AIRWAY INHALATION TREATMENT: CPT

## 2021-05-18 RX ORDER — DIPHENHYDRAMINE HYDROCHLORIDE 50 MG/ML
25 INJECTION INTRAMUSCULAR; INTRAVENOUS ONCE
Status: COMPLETED | OUTPATIENT
Start: 2021-05-18 | End: 2021-05-18

## 2021-05-18 RX ORDER — LORAZEPAM 2 MG/ML
INJECTION INTRAMUSCULAR
Status: COMPLETED
Start: 2021-05-18 | End: 2021-05-18

## 2021-05-18 RX ORDER — DIPHENHYDRAMINE HYDROCHLORIDE 50 MG/ML
50 INJECTION INTRAMUSCULAR; INTRAVENOUS ONCE
Status: DISCONTINUED | OUTPATIENT
Start: 2021-05-18 | End: 2021-05-18

## 2021-05-18 RX ORDER — POTASSIUM CHLORIDE 7.45 MG/ML
10 INJECTION INTRAVENOUS CONTINUOUS
Status: DISCONTINUED | OUTPATIENT
Start: 2021-05-18 | End: 2021-05-18 | Stop reason: HOSPADM

## 2021-05-18 RX ORDER — PROPOFOL 10 MG/ML
INJECTION, EMULSION INTRAVENOUS
Status: COMPLETED
Start: 2021-05-18 | End: 2021-05-18

## 2021-05-18 RX ORDER — MAGNESIUM SULFATE 1 G/100ML
1 INJECTION INTRAVENOUS ONCE
Status: DISCONTINUED | OUTPATIENT
Start: 2021-05-18 | End: 2021-05-18 | Stop reason: HOSPADM

## 2021-05-18 RX ORDER — LORAZEPAM 2 MG/ML
2 INJECTION INTRAMUSCULAR ONCE
Status: DISCONTINUED | OUTPATIENT
Start: 2021-05-18 | End: 2021-05-18

## 2021-05-18 RX ORDER — METHYLPREDNISOLONE SODIUM SUCCINATE 125 MG/2ML
INJECTION, POWDER, LYOPHILIZED, FOR SOLUTION INTRAMUSCULAR; INTRAVENOUS
Status: COMPLETED
Start: 2021-05-18 | End: 2021-05-18

## 2021-05-18 RX ORDER — THIAMINE HYDROCHLORIDE 100 MG/ML
100 INJECTION, SOLUTION INTRAMUSCULAR; INTRAVENOUS ONCE
Status: COMPLETED | OUTPATIENT
Start: 2021-05-18 | End: 2021-05-18

## 2021-05-18 RX ORDER — PROPOFOL 10 MG/ML
INJECTION, EMULSION INTRAVENOUS PRN
Status: DISCONTINUED | OUTPATIENT
Start: 2021-05-18 | End: 2021-05-18

## 2021-05-18 RX ORDER — TERBUTALINE SULFATE 1 MG/ML
0.25 INJECTION, SOLUTION SUBCUTANEOUS ONCE
Status: COMPLETED | OUTPATIENT
Start: 2021-05-18 | End: 2021-05-18

## 2021-05-18 RX ORDER — METHYLPREDNISOLONE SODIUM SUCCINATE 125 MG/2ML
125 INJECTION, POWDER, LYOPHILIZED, FOR SOLUTION INTRAMUSCULAR; INTRAVENOUS ONCE
Status: DISCONTINUED | OUTPATIENT
Start: 2021-05-18 | End: 2021-05-18

## 2021-05-18 RX ORDER — DIPHENHYDRAMINE HYDROCHLORIDE 50 MG/ML
INJECTION INTRAMUSCULAR; INTRAVENOUS
Status: COMPLETED
Start: 2021-05-18 | End: 2021-05-18

## 2021-05-18 RX ORDER — LORAZEPAM 2 MG/ML
2 INJECTION INTRAMUSCULAR ONCE
Status: COMPLETED | OUTPATIENT
Start: 2021-05-18 | End: 2021-05-18

## 2021-05-18 RX ORDER — METHYLPREDNISOLONE SODIUM SUCCINATE 125 MG/2ML
125 INJECTION, POWDER, LYOPHILIZED, FOR SOLUTION INTRAMUSCULAR; INTRAVENOUS ONCE
Status: COMPLETED | OUTPATIENT
Start: 2021-05-18 | End: 2021-05-18

## 2021-05-18 RX ORDER — PROPOFOL 10 MG/ML
0.5 INJECTION, EMULSION INTRAVENOUS CONTINUOUS
Status: DISCONTINUED | OUTPATIENT
Start: 2021-05-18 | End: 2021-05-18 | Stop reason: HOSPADM

## 2021-05-18 RX ADMIN — METHYLPREDNISOLONE SODIUM SUCCINATE 125 MG: 125 INJECTION, POWDER, FOR SOLUTION INTRAMUSCULAR; INTRAVENOUS at 11:37

## 2021-05-18 RX ADMIN — TAZOBACTAM SODIUM AND PIPERACILLIN SODIUM 4.5 G: 500; 4 INJECTION, SOLUTION INTRAVENOUS at 12:44

## 2021-05-18 RX ADMIN — PROPOFOL: 10 INJECTION, EMULSION INTRAVENOUS at 13:04

## 2021-05-18 RX ADMIN — SODIUM CHLORIDE 1000 ML: 9 INJECTION, SOLUTION INTRAVENOUS at 12:35

## 2021-05-18 RX ADMIN — RACEPINEPHRINE HYDROCHLORIDE 0.5 ML: 11.25 SOLUTION RESPIRATORY (INHALATION) at 11:43

## 2021-05-18 RX ADMIN — LORAZEPAM 2 MG: 2 INJECTION INTRAMUSCULAR; INTRAVENOUS at 11:36

## 2021-05-18 RX ADMIN — DIPHENHYDRAMINE HYDROCHLORIDE 25 MG: 50 INJECTION, SOLUTION INTRAMUSCULAR; INTRAVENOUS at 11:39

## 2021-05-18 RX ADMIN — TERBUTALINE SULFATE 0.25 MG: 1 INJECTION SUBCUTANEOUS at 12:12

## 2021-05-18 RX ADMIN — SODIUM CHLORIDE 1000 ML: 9 INJECTION, SOLUTION INTRAVENOUS at 12:46

## 2021-05-18 RX ADMIN — LORAZEPAM 2 MG: 2 INJECTION INTRAMUSCULAR at 11:36

## 2021-05-18 RX ADMIN — SUCCINYLCHOLINE CHLORIDE 100 MG: 20 INJECTION, SOLUTION INTRAMUSCULAR; INTRAVENOUS at 12:49

## 2021-05-18 RX ADMIN — DIPHENHYDRAMINE HYDROCHLORIDE 25 MG: 50 INJECTION INTRAMUSCULAR; INTRAVENOUS at 11:39

## 2021-05-18 RX ADMIN — METHYLPREDNISOLONE SODIUM SUCCINATE 125 MG: 125 INJECTION INTRAMUSCULAR; INTRAVENOUS at 11:37

## 2021-05-18 RX ADMIN — THIAMINE HYDROCHLORIDE 100 MG: 100 INJECTION, SOLUTION INTRAMUSCULAR; INTRAVENOUS at 12:00

## 2021-05-18 RX ADMIN — POTASSIUM CHLORIDE 10 MEQ: 7.46 INJECTION, SOLUTION INTRAVENOUS at 13:15

## 2021-05-18 RX ADMIN — PROPOFOL 150 MG: 10 INJECTION, EMULSION INTRAVENOUS at 12:49

## 2021-05-18 RX ADMIN — FAMOTIDINE 20 MG: 10 INJECTION, SOLUTION INTRAVENOUS at 11:40

## 2021-05-18 ASSESSMENT — LIFESTYLE VARIABLES: TOBACCO_USE: 1

## 2021-05-18 NOTE — ED NOTES
"Spoke with patient regarding notifying any family of transfer and condition. Patient refused, states \"they knew I was coming here\". Discussed with patient that he would not be able to call them on his own as he will be intubated and asleep, he remained refusing family notification.  "

## 2021-05-18 NOTE — ED PROVIDER NOTES
History     Chief Complaint   Patient presents with     Shortness of Breath     HPI  Dax Villareal is a 30 year old male who presents here with shortness of breath.  He is a chronic alcoholic.  He woke this morning with shortness of breath and difficulty swallowing.  He says he cannot swallow his spit.  He did try to have a couple of shots this morning to help with the shakes but he was not able to keep them down he did vomit he vomited up a small amount of vomit with some bright red blood in it.  He has no known history of esophageal varices.  He claims that he felt fine last night and awoke with the symptoms.  He does not have a fever.  He denies any chills.  He denies any chest pain.  He did complains of abdominal pain that got here.  He is not nauseated but he did have the vomiting after trying to take the shots.  Denies any black stool.  No problems with urination.    Allergies:  No Known Allergies    Problem List:    Patient Active Problem List    Diagnosis Date Noted     Obesity 12/17/2020     Priority: Medium     Alcohol withdrawal syndrome without complication (H) 11/04/2019     Priority: Medium     Furuncle of skin or subcutaneous tissue 10/12/2011     Priority: Medium        Past Medical History:    Past Medical History:   Diagnosis Date     ADHD 2008       Past Surgical History:    Past Surgical History:   Procedure Laterality Date     ORTHOPEDIC SURGERY Right     fracture repair       Family History:    Family History   Problem Relation Age of Onset     Attention Deficit Disorder Mother      Alcoholism Mother      Alcoholism Father      Alcoholism Brother      Alcoholism Sister        Social History:  Marital Status:  Single [1]  Social History     Tobacco Use     Smoking status: Current Every Day Smoker     Smokeless tobacco: Never Used   Substance Use Topics     Alcohol use: Yes     Drug use: No        Medications:    augmented betamethasone dipropionate (DIPROLENE-AF) 0.05 % external  ointment  hydrOXYzine (ATARAX) 25 MG tablet  ondansetron (ZOFRAN) 4 MG tablet  potassium chloride ER (K-TAB) 20 MEQ CR tablet          Review of Systems  I did do a complete 10 point review of systems. Pertinent positives and negatives listed per HPI. All other systems have been reviewed and are negative.      Physical Exam   BP: 104/59  Pulse: (!) 142  Temp: 98.1  F (36.7  C)  Resp: (!) 29  Weight: 84.5 kg (186 lb 4.8 oz)  SpO2: 94 %      Physical Exam  Vitals signs and nursing note reviewed.   Constitutional:       General: He is in acute distress.      Appearance: He is obese. He is ill-appearing and toxic-appearing. He is not diaphoretic.      Comments: He has stridor   HENT:      Mouth/Throat:      Mouth: Mucous membranes are moist.      Pharynx: Oropharynx is clear.      Comments: He does not have any trismus.  His speech is garbled and difficult to understand  Eyes:      General: No scleral icterus.     Comments: He has bilateral scleral injection.   Neck:      Comments: Stridor  Cardiovascular:      Rate and Rhythm: Tachycardia present.      Comments: Heart rate 140s  Pulmonary:      Effort: Respiratory distress present.      Comments: Stridor  Abdominal:      General: Bowel sounds are normal.      Palpations: Abdomen is soft.      Tenderness: There is no abdominal tenderness.   Musculoskeletal:         General: No deformity or signs of injury.   Skin:     General: Skin is warm.      Coloration: Skin is not jaundiced.   Neurological:      General: No focal deficit present.      Mental Status: He is oriented to person, place, and time.         ED Course   Gentleman presents here with stridor.  He is not on lisinopril.  He actually does not take any medications.  He has had no new foods.  He does not have any history of asthma.  He is a smoker.  We did give 125 IV Solu-Medrol.  He was given 50 mg IV Benadryl 20 mg IV Pepcid.  He is given racemic epi nebulizer.  He seemed to get some improvement after the  racemic epinephrine nebulizer.  He did get a dose of subcutaneous terbutaline as well.  His heart rates in the 140s so we refrain from actual epinephrine injection.  I did have concerns for airway compromise and we needed to protect his airway.  Anesthesia was consulted they did come down and intubate the patient.  He intubated with a 7 no ET tube at 22 cm.  He did have significant swelling in the airway essentially was global tissue swelling the epiglottis did not look red or hot but it was swollen along with everything else around it.  Chest x-ray was negative.  Read by the radiologist.  Laboratories White count is 13.4.  Hemoglobin is normal at 16.  Lactic is 7.7.  Alcohol is 0.04.  Magnesium is 1.6.  Lipase is 381.  Comprehensive metabolic panel sodium 127 potassium was 2.0 chloride 70 carbon dioxide is 42 anion gap is +15 calcium is 13.4 bili is 8.2 albumin is 2.3 alk phos is 309 ALT is 95 AST is 237.  Procalcitonin 1.65.  We did type and screen him.  Blood cultures are pending.  Troponin 0 0.017.  We have to see with the lactic acid like that that this is infectious related as well with the airway edema we did give Zosyn as well as vancomycin.  He received 2 L of fluid.  When he presented here he was tachycardic and hypotensive he is now normotensive.  He is intubated on propofol drip.  He has good volumes and is ventilating well and oxygenating well.  We did give him potassium 10 mEq IV.  He is accepted at [facility] in [place].  He will be transferred via [facility] helicopter.  Should be noted that he did have full body tremulous when he presented here he was in alcohol withdrawal he did receive 2 mg of IV Ativan as well.  Total critical care time is 60 minutes    ED Course as of May 18 2015   Tue May 18, 2021   1300 CBC with platelets differential   2015 Magnesium: 1.6         Results for orders placed or performed during the hospital encounter of 05/18/21 (from the past 24 hour(s))   XR Chest Port 1  View    Narrative    PROCEDURE:  XR CHEST PORT 1 VIEW    HISTORY:  sob.     COMPARISON:  None.    FINDINGS:   The cardiac silhouette is normal in size. The pulmonary vasculature is  normal.  The lungs are clear. No pleural effusion or pneumothorax.      Impression    IMPRESSION:  No acute cardiopulmonary disease.      NICOLASA HERRERA MD   Alcohol ethyl   Result Value Ref Range    Ethanol g/dL 0.04 (H) 0.01 g/dL   CBC with platelets differential   Result Value Ref Range    WBC 13.2 (H) 4.0 - 11.0 10e9/L    RBC Count 4.58 4.4 - 5.9 10e12/L    Hemoglobin 16.6 13.3 - 17.7 g/dL    Hematocrit 44.8 40.0 - 53.0 %    MCV 98 78 - 100 fl    MCH 36.2 (H) 26.5 - 33.0 pg    MCHC 37.1 (H) 31.5 - 36.5 g/dL    RDW 17.3 (H) 10.0 - 15.0 %    Platelet Count 222 150 - 450 10e9/L    Diff Method Automated Method     % Neutrophils 90.9 %    % Lymphocytes 4.3 %    % Monocytes 3.1 %    % Eosinophils 0.3 %    % Basophils 0.2 %    % Immature Granulocytes 1.2 %    Nucleated RBCs 1 (H) 0 /100    Absolute Neutrophil 12.0 (H) 1.6 - 8.3 10e9/L    Absolute Lymphocytes 0.6 (L) 0.8 - 5.3 10e9/L    Absolute Monocytes 0.4 0.0 - 1.3 10e9/L    Absolute Eosinophils 0.0 0.0 - 0.7 10e9/L    Absolute Basophils 0.0 0.0 - 0.2 10e9/L    Abs Immature Granulocytes 0.2 0 - 0.4 10e9/L    Absolute Nucleated RBC 0.1    Comprehensive metabolic panel   Result Value Ref Range    Sodium 127 (L) 133 - 144 mmol/L    Potassium 2.0 (LL) 3.4 - 5.3 mmol/L    Chloride 70 (L) 94 - 109 mmol/L    Carbon Dioxide 42 (H) 20 - 32 mmol/L    Anion Gap 15 (H) 3 - 14 mmol/L    Glucose 89 70 - 99 mg/dL    Urea Nitrogen 10 7 - 30 mg/dL    Creatinine 0.82 0.66 - 1.25 mg/dL    GFR Estimate >90 >60 mL/min/[1.73_m2]    GFR Estimate If Black >90 >60 mL/min/[1.73_m2]    Calcium 13.4 (HH) 8.5 - 10.1 mg/dL    Bilirubin Total 8.2 (H) 0.2 - 1.3 mg/dL    Albumin 2.3 (L) 3.4 - 5.0 g/dL    Protein Total 7.3 6.8 - 8.8 g/dL    Alkaline Phosphatase 309 (H) 40 - 150 U/L    ALT 95 (H) 0 - 70 U/L      (H) 0 - 45 U/L   Lactic acid whole blood   Result Value Ref Range    Lactic Acid 7.7 (HH) 0.7 - 2.0 mmol/L   Lipase   Result Value Ref Range    Lipase 381 73 - 393 U/L   Magnesium   Result Value Ref Range    Magnesium 1.6 1.6 - 2.3 mg/dL   Procalcitonin   Result Value Ref Range    Procalcitonin 1.65 ng/ml   Troponin I   Result Value Ref Range    Troponin I ES 0.017 0.000 - 0.045 ug/L   ABO/Rh type and screen   Result Value Ref Range    ABO PENDING     Antibody Screen PENDING     Test Valid Only At [facility]        Specimen Expires 05/21/2021        Medications   famotidine (PEPCID) injection 20 mg (20 mg Intravenous Given 5/18/21 1140)   magnesium sulfate 1 gm in 100mL D5W intermittent infusion (has no administration in time range)   0.9% sodium chloride BOLUS (1,000 mLs Intravenous New Bag 5/18/21 1246)   potassium chloride 10 mEq in 100 mL sterile water intermittent infusion (premix) (10 mEq Intravenous New Bag 5/18/21 1315)   propofol (DIPRIVAN) injection 10 mg/mL vial (0 mg Intravenous Stopped 5/18/21 1325)   racEPINEPHrine neb solution 0.5 mL (0.5 mLs Nebulization Given 5/18/21 1143)   thiamine (B-1) injection 100 mg (100 mg Intravenous Given 5/18/21 1200)   diphenhydrAMINE (BENADRYL) injection 25 mg (25 mg Intravenous Given 5/18/21 1139)   LORazepam (ATIVAN) injection 2 mg (2 mg Intravenous Given 5/18/21 1136)   methylPREDNISolone sodium succinate (solu-MEDROL) injection 125 mg (125 mg Intravenous Given 5/18/21 1137)   terbutaline (BRETHINE) injection 0.25 mg (0.25 mg Subcutaneous Given 5/18/21 1212)   0.9% sodium chloride BOLUS (0 mLs Intravenous Stopped 5/18/21 1315)   piperacillin-tazobactam (ZOSYN) intermittent infusion 4.5 g (0 g Intravenous Stopped 5/18/21 1315)       Assessments & Plan (with Medical Decision Making)     I have reviewed the nursing notes.    I have reviewed the findings, diagnosis, plan and need for follow up with the patient.      New Prescriptions    No medications on  file       Final diagnoses:   Sepsis, due to unspecified organism, unspecified whether acute organ dysfunction present (H)   Airway compromise       5/18/2021   [facility] EMERGENCY DEPART[facility]

## 2021-05-18 NOTE — ED PROVIDER NOTES
I am seeing this patient along with Elijah DONOVAN.    HPI: 30-year-old male presented to the emergency department with acute stridor.  His reports shortness of breath and difficulty swallowing with a fullness sensation in his throat.  He denies any use of lisinopril.  Patient stridor and respiratory status, it was difficult to get a full review of systems on this patient.  He does report having a history of alcohol abuse, did attempt drinking this morning but had difficulty with this.  Patient was attempting to drink to try to treat his withdrawal type symptoms.      ROS:   Review of systems Limited to HPI due to patient's clinical status    Physical Exam:    VITAL SIGNS: BP (!) 84/57   Pulse (!) 147   Temp 98.1  F (36.7  C) (Tympanic)   Resp 19   Wt 84.5 kg (186 lb 4.8 oz)   SpO2 95%   BMI 30.07 kg/m      General Appearance: well-nourished, mild respiratory distress  Head:  Normocephalic, without obvious abnormality, atraumatic  Eyes:  PERRL, conjunctiva/corneas clear, EOM's intact,  ENT:  Lips, mucosa, and tongue normal, membranes are moist without pallor  Neck:  Normal ROM, symmetrical, trachea midline    Cardio:  Regular rate and rhythm, tachycardia, no murmur, rub or gallop, 2+ pulses symmetric in all extremities  Pulm: Expiratory stridor with increased work of breathing  Abdomen:  Soft, non-tender, no rebound or guarding.  Musculoskeletal: Full ROM, no edema, no cyanosis, good ROM of major joints  Integument:  Warm, Dry, No erythema, No rash.    Neurologic:  Alert & oriented.  No focal deficits appreciated.  Ambulatory.  Psychiatric:  Affect normal, Judgment normal, Mood normal.        ED COURSE & MEDICAL DECISION MAKING    Pertinent Labs and Imagaing reviewed (see chart for details)    30 year old male presenting to the emergency department obvious alcohol withdrawals with and with significant stridor.  Due to the patient's clinical picture, we were concerned for impending airway compromise, and  consulted with anesthesia due to concern for difficult airway scenario.  Anesthesia did come to the emergency department to intubate the patient, and on the glide scope we were able to see that the patient had significant edema of the epiglottis and surrounding tissues, but no obvious erythema that would be suggestive of epiglottitis.  They were able to establish an airway with a 7-0 ET tube.  Patient was noted to be significantly hypokalemic, with other significant abnormalities of his labs.  At this time he cannot be admitted here due to lack of bed space, and I believe the specialist he will require will not be available at this hospital.  Therefore he was [facility] for further management by [facility].    At the conclusion of the encounter I discussed  the results of all of the tests and the disposition.   The questions were answered.  The patient or family acknowledged understanding and was agreeable with the care plan.       FINAL IMPRESSION      1. Sepsis  2. Airway compromise  3. Alcohol withdrawal  4. hypokalemia       Amadou Dallas,   05/18/21 1500

## 2021-05-18 NOTE — ED NOTES
Patient resting on cot. Audible stridor remains at this time. Anesthesia, RT, PA and MD at bedside.

## 2021-05-18 NOTE — ANESTHESIA POSTPROCEDURE EVALUATION
Patient: Dax Villareal    * No procedures listed *    Diagnosis:* No pre-op diagnosis entered *  Diagnosis Additional Information: No value filed.    Anesthesia Type:  General    Note:  Disposition: Inpatient   Postop Pain Control: Uneventful            Sign Out: Well controlled pain   PONV: No   Neuro/Psych: Uneventful            Sign Out: PLANNED postop sedation   Airway/Respiratory: Uneventful            Sign Out: AIRWAY IN SITU/Resp. Support               Airway in situ/Resp. Support: ETT                 Reason: Unplanned   CV/Hemodynamics: Uneventful            Sign Out: Acceptable CV status   Other NRE: NONE   DID A NON-ROUTINE EVENT OCCUR? No           Last vitals:  Vitals:    05/18/21 1220 05/18/21 1230 05/18/21 1240   BP: 91/63 (!) 84/57 (!) 85/68   Pulse: (!) 146 (!) 147 (!) 140   Resp: (!) 7 19 24   Temp: 98.1  F (36.7  C)     SpO2: (!) 87% 95% 94%       Last vitals prior to Anesthesia Care Transfer:  CRNA VITALS  5/18/2021 1244 - 5/18/2021 1314      5/18/2021             SpO2:  98 %    EKG:  Sinus tachycardia          Electronically Signed By: SOFIA Wynne CRNA  May 18, 2021  1:14 PM

## 2021-05-18 NOTE — ANESTHESIA PROCEDURE NOTES
Airway       Patient location during procedure: ED       Procedure Start/Stop Times: 5/18/2021 12:49 PM and 5/18/2021 12:50 PM  Staff -        CRNA: Hebert Yadav APRN CRNA       Performed By: CRNA  Consent for Airway        Urgency: elective  Report Obtained from Primary Care Team       History regarding most recent potassium obtained: Yes       History regarding presence/absence of renal failure obtained:Yes       History regarding stroke/CVA obtained:Yes       History regarding presence/absence of NM disorder: YesIndications and Patient Condition       Indications for airway management: airway protection, altered level of consciousness, hemodynamic instability and respiratory insufficiency       Mallampati: IV     Induction type:intravenous       Mask difficulty assessment: 0 - not attempted    Final Airway Details       Final airway type: endotracheal airway       Successful airway: ETT - single and Oral  Endotracheal Airway Details        ETT size (mm): 7.0       Cuffed: yes       Successful intubation technique: video laryngoscopy       VL Blade Size: Glidescope 3       Grade View of Cords: 2 (patients airway very swollen)       Adjucts: stylet       Position: Center       Measured from: lips       Secured at (cm): 22       Bite block used: None    Post intubation assessment        ETT secured, Vent settings by primary/ICU team and Primary/ICU team to review CXR       Placement verified by: capnometry, equal breath sounds and chest rise        Number of attempts at approach: 1       Number of other approaches attempted: 0       Secured with: commercial tube smith       Ease of procedure: difficult       Dentition: Intact and Unchanged    Medication(s) Administered   Medication Administration Time: 5/18/2021 12:50 PM    Additional Comments       No complications but patients airway very swollen which made visualization difficult.

## 2021-05-18 NOTE — ANESTHESIA PREPROCEDURE EVALUATION
Anesthesia Pre-Procedure Evaluation    Patient: Dax Villareal   MRN: [id number] : 1990        Preoperative Diagnosis: * No surgery found *   Procedure :      Past Medical History:   Diagnosis Date     ADHD       Past Surgical History:   Procedure Laterality Date     ORTHOPEDIC SURGERY Right     fracture repair      No Known Allergies   Social History     Tobacco Use     Smoking status: Current Every Day Smoker     Smokeless tobacco: Never Used   Substance Use Topics     Alcohol use: Yes      Wt Readings from Last 1 Encounters:   21 84.5 kg (186 lb 4.8 oz)        Anesthesia Evaluation   Pt has had prior anesthetic.         ROS/MED HX  ENT/Pulmonary:     (+) tobacco use, Current use,     Neurologic:       Cardiovascular:       METS/Exercise Tolerance:     Hematologic:       Musculoskeletal:       GI/Hepatic:       Renal/Genitourinary:       Endo:     (+) Obesity,     Psychiatric/Substance Use:     (+) psychiatric history other (comment) (ADHD) alcohol abuse     Infectious Disease:       Malignancy:       Other:            Physical Exam    Airway        Mallampati: IV   TM distance: > 3 FB   Neck ROM: full   Mouth opening: > 3 cm    Respiratory Devices and Support         Dental           Cardiovascular          Rhythm and rate: regular and tachycardia     Pulmonary           (+) decreased breath sounds           OUTSIDE LABS:  CBC:   Lab Results   Component Value Date    WBC 13.2 (H) 2021    WBC 4.2 2020    HGB 16.6 2021    HGB 17.2 2020    HCT 44.8 2021    HCT 47.7 2020     2021     2020     BMP:   Lab Results   Component Value Date     (L) 2021     2021    POTASSIUM 2.0 (LL) 2021    POTASSIUM 3.1 (L) 2021    CHLORIDE 70 (L) 2021    CHLORIDE 95 2021    CO2 42 (H) 2021    CO2 31 2021    BUN 10 2021    BUN 6 (L) 2021    CR 0.82 2021    CR 0.70 2021    GLC 89  05/18/2021     (H) 01/11/2021     COAGS: No results found for: PTT, INR, FIBR  POC: No results found for: BGM, HCG, HCGS  HEPATIC:   Lab Results   Component Value Date    ALBUMIN 2.3 (L) 05/18/2021    PROTTOTAL 7.3 05/18/2021    ALT 95 (H) 05/18/2021     (H) 05/18/2021    ALKPHOS 309 (H) 05/18/2021    BILITOTAL 8.2 (H) 05/18/2021     OTHER:   Lab Results   Component Value Date    LACT 7.7 (HH) 05/18/2021    JANENE 13.4 (HH) 05/18/2021    MAG 1.6 05/18/2021    LIPASE 381 05/18/2021    CRP 16.9 (H) 10/06/2020       Anesthesia Plan    ASA Status:  3, emergent    NPO Status:  NPO Appropriate    Anesthesia Type: General.     - Airway: ETT   Induction: RSI, Propofol, Intravenous.           Consents    Anesthesia Plan(s) and associated risks, benefits, and realistic alternatives discussed. Questions answered and patient/representative(s) expressed understanding.     - Discussed with:  Implied consent/emergency         Postoperative Care            Comments:                SOFIA Wynne CRNA

## 2021-05-18 NOTE — ED NOTES
Patient room by security via wheelchair. Patient complaining of shortness of breath and hematemesis. Patient appears in distress. Audible stridor. Reports he has been staying at the [facility], lives in [place] normally with his mother. Reports he drinks alcohol frequently, last had a shot this morning and vomited it up. RT notified immediately on arrival. Reports a friend drove him to the ER. Attempted to take benadryl PTA with no relief.

## 2021-05-18 NOTE — ED NOTES
Hyper oxygenation at this time to prepare for intubation. MD, RT, CRNA x2 at bedside for intubation via glidescope.

## 2021-05-24 LAB
BACTERIA SPEC CULT: NORMAL
BACTERIA SPEC CULT: NORMAL
SPECIMEN SOURCE: NORMAL
SPECIMEN SOURCE: NORMAL

## 2021-06-28 ENCOUNTER — HOSPITAL ENCOUNTER (INPATIENT)
Facility: CLINIC | Age: 31
LOS: 106 days | Discharge: NURSING, REHAB OR LONG-TERM CARE | End: 2021-10-12
Attending: INTERNAL MEDICINE | Admitting: INTERNAL MEDICINE
Payer: MEDICAID

## 2021-06-28 DIAGNOSIS — R05.9 COUGH: ICD-10-CM

## 2021-06-28 DIAGNOSIS — E43 SEVERE MALNUTRITION (H): Primary | ICD-10-CM

## 2021-06-28 DIAGNOSIS — K31.6 GASTROCUTANEOUS FISTULA DUE TO GASTROSTOMY TUBE: ICD-10-CM

## 2021-06-28 DIAGNOSIS — G62.9 PERIPHERAL POLYNEUROPATHY: ICD-10-CM

## 2021-06-28 DIAGNOSIS — N17.0 ACUTE KIDNEY FAILURE WITH TUBULAR NECROSIS (H): ICD-10-CM

## 2021-06-28 DIAGNOSIS — Z72.0 TOBACCO ABUSE: ICD-10-CM

## 2021-06-28 DIAGNOSIS — Z93.1 GASTROSTOMY TUBE IN PLACE (H): ICD-10-CM

## 2021-06-28 DIAGNOSIS — K85.91 NECROTIZING PANCREATITIS: ICD-10-CM

## 2021-06-28 DIAGNOSIS — G89.28 OTHER CHRONIC POSTPROCEDURAL PAIN: ICD-10-CM

## 2021-06-28 DIAGNOSIS — L85.3 DRY SKIN: ICD-10-CM

## 2021-06-28 DIAGNOSIS — R45.84 ANHEDONIA: ICD-10-CM

## 2021-06-28 DIAGNOSIS — K85.22 ALCOHOL-INDUCED ACUTE PANCREATITIS WITH INFECTED NECROSIS: ICD-10-CM

## 2021-06-28 PROBLEM — K85.90 PANCREATITIS: Status: ACTIVE | Noted: 2021-06-28

## 2021-06-28 LAB
ABO + RH BLD: NORMAL
ABO + RH BLD: NORMAL
ALBUMIN SERPL-MCNC: 3 G/DL (ref 3.4–5)
ALP SERPL-CCNC: 90 U/L (ref 40–150)
ALT SERPL W P-5'-P-CCNC: 23 U/L (ref 0–70)
AMMONIA PLAS-SCNC: 28 UMOL/L (ref 10–50)
ANION GAP SERPL CALCULATED.3IONS-SCNC: 6 MMOL/L (ref 3–14)
APTT PPP: 97 SEC (ref 22–37)
AST SERPL W P-5'-P-CCNC: 74 U/L (ref 0–45)
BASE EXCESS BLDV CALC-SCNC: 2.4 MMOL/L
BILIRUB SERPL-MCNC: 21.3 MG/DL (ref 0.2–1.3)
BLD GP AB SCN SERPL QL: NORMAL
BLOOD BANK CMNT PATIENT-IMP: NORMAL
BUN SERPL-MCNC: 14 MG/DL (ref 7–30)
CALCIUM SERPL-MCNC: 8.2 MG/DL (ref 8.5–10.1)
CHLORIDE SERPL-SCNC: 101 MMOL/L (ref 94–109)
CO2 SERPL-SCNC: 27 MMOL/L (ref 20–32)
CREAT SERPL-MCNC: 1.78 MG/DL (ref 0.66–1.25)
ERYTHROCYTE [DISTWIDTH] IN BLOOD BY AUTOMATED COUNT: ABNORMAL % (ref 10–15)
FIBRINOGEN PPP-MCNC: 225 MG/DL (ref 200–420)
GFR SERPL CREATININE-BSD FRML MDRD: 49 ML/MIN/{1.73_M2}
GLUCOSE BLDC GLUCOMTR-MCNC: 108 MG/DL (ref 70–99)
GLUCOSE SERPL-MCNC: 119 MG/DL (ref 70–99)
HCO3 BLDV-SCNC: 27 MMOL/L (ref 21–28)
HCT VFR BLD AUTO: 23.4 % (ref 40–53)
HGB BLD-MCNC: 7.6 G/DL (ref 13.3–17.7)
INR PPP: 3.76 (ref 0.86–1.14)
LACTATE BLD-SCNC: 1.9 MMOL/L (ref 0.7–2)
LIPASE SERPL-CCNC: 199 U/L (ref 73–393)
MCH RBC QN AUTO: 32.8 PG (ref 26.5–33)
MCHC RBC AUTO-ENTMCNC: 32.5 G/DL (ref 31.5–36.5)
MCV RBC AUTO: 101 FL (ref 78–100)
O2/TOTAL GAS SETTING VFR VENT: ABNORMAL %
PCO2 BLDV: 39 MM HG (ref 40–50)
PH BLDV: 7.44 PH (ref 7.32–7.43)
PLATELET # BLD AUTO: 127 10E9/L (ref 150–450)
PO2 BLDV: 36 MM HG (ref 25–47)
POTASSIUM SERPL-SCNC: 3.4 MMOL/L (ref 3.4–5.3)
PROCALCITONIN SERPL-MCNC: 5.56 NG/ML
PROT SERPL-MCNC: 6.8 G/DL (ref 6.8–8.8)
RBC # BLD AUTO: 2.32 10E12/L (ref 4.4–5.9)
SODIUM SERPL-SCNC: 134 MMOL/L (ref 133–144)
SPECIMEN EXP DATE BLD: NORMAL
WBC # BLD AUTO: 50.6 10E9/L (ref 4–11)

## 2021-06-28 PROCEDURE — 82803 BLOOD GASES ANY COMBINATION: CPT | Performed by: STUDENT IN AN ORGANIZED HEALTH CARE EDUCATION/TRAINING PROGRAM

## 2021-06-28 PROCEDURE — 84145 PROCALCITONIN (PCT): CPT | Performed by: STUDENT IN AN ORGANIZED HEALTH CARE EDUCATION/TRAINING PROGRAM

## 2021-06-28 PROCEDURE — 85004 AUTOMATED DIFF WBC COUNT: CPT | Performed by: STUDENT IN AN ORGANIZED HEALTH CARE EDUCATION/TRAINING PROGRAM

## 2021-06-28 PROCEDURE — 85610 PROTHROMBIN TIME: CPT | Performed by: STUDENT IN AN ORGANIZED HEALTH CARE EDUCATION/TRAINING PROGRAM

## 2021-06-28 PROCEDURE — 99223 1ST HOSP IP/OBS HIGH 75: CPT | Mod: GC | Performed by: INTERNAL MEDICINE

## 2021-06-28 PROCEDURE — 85730 THROMBOPLASTIN TIME PARTIAL: CPT | Performed by: STUDENT IN AN ORGANIZED HEALTH CARE EDUCATION/TRAINING PROGRAM

## 2021-06-28 PROCEDURE — 82140 ASSAY OF AMMONIA: CPT | Performed by: STUDENT IN AN ORGANIZED HEALTH CARE EDUCATION/TRAINING PROGRAM

## 2021-06-28 PROCEDURE — 85027 COMPLETE CBC AUTOMATED: CPT | Performed by: STUDENT IN AN ORGANIZED HEALTH CARE EDUCATION/TRAINING PROGRAM

## 2021-06-28 PROCEDURE — 86901 BLOOD TYPING SEROLOGIC RH(D): CPT | Performed by: STUDENT IN AN ORGANIZED HEALTH CARE EDUCATION/TRAINING PROGRAM

## 2021-06-28 PROCEDURE — 86850 RBC ANTIBODY SCREEN: CPT | Performed by: STUDENT IN AN ORGANIZED HEALTH CARE EDUCATION/TRAINING PROGRAM

## 2021-06-28 PROCEDURE — 86900 BLOOD TYPING SEROLOGIC ABO: CPT | Performed by: STUDENT IN AN ORGANIZED HEALTH CARE EDUCATION/TRAINING PROGRAM

## 2021-06-28 PROCEDURE — 85384 FIBRINOGEN ACTIVITY: CPT | Performed by: STUDENT IN AN ORGANIZED HEALTH CARE EDUCATION/TRAINING PROGRAM

## 2021-06-28 PROCEDURE — 83605 ASSAY OF LACTIC ACID: CPT | Performed by: STUDENT IN AN ORGANIZED HEALTH CARE EDUCATION/TRAINING PROGRAM

## 2021-06-28 PROCEDURE — 999N001017 HC STATISTIC GLUCOSE BY METER IP

## 2021-06-28 PROCEDURE — 3E043XZ INTRODUCTION OF VASOPRESSOR INTO CENTRAL VEIN, PERCUTANEOUS APPROACH: ICD-10-PCS | Performed by: INTERNAL MEDICINE

## 2021-06-28 PROCEDURE — 250N000009 HC RX 250: Performed by: INTERNAL MEDICINE

## 2021-06-28 PROCEDURE — 80053 COMPREHEN METABOLIC PANEL: CPT | Performed by: STUDENT IN AN ORGANIZED HEALTH CARE EDUCATION/TRAINING PROGRAM

## 2021-06-28 PROCEDURE — 200N000002 HC R&B ICU UMMC

## 2021-06-28 PROCEDURE — 83690 ASSAY OF LIPASE: CPT | Performed by: STUDENT IN AN ORGANIZED HEALTH CARE EDUCATION/TRAINING PROGRAM

## 2021-06-28 RX ORDER — NOREPINEPHRINE BITARTRATE 0.06 MG/ML
.01-.6 INJECTION, SOLUTION INTRAVENOUS CONTINUOUS
Status: DISCONTINUED | OUTPATIENT
Start: 2021-06-28 | End: 2021-06-30

## 2021-06-28 RX ORDER — NICOTINE POLACRILEX 4 MG
15-30 LOZENGE BUCCAL
Status: DISCONTINUED | OUTPATIENT
Start: 2021-06-28 | End: 2021-10-12 | Stop reason: HOSPADM

## 2021-06-28 RX ORDER — DEXMEDETOMIDINE HYDROCHLORIDE 4 UG/ML
0.2-0.7 INJECTION, SOLUTION INTRAVENOUS CONTINUOUS
Status: DISCONTINUED | OUTPATIENT
Start: 2021-06-28 | End: 2021-06-28

## 2021-06-28 RX ORDER — DEXTROSE MONOHYDRATE 25 G/50ML
25-50 INJECTION, SOLUTION INTRAVENOUS
Status: DISCONTINUED | OUTPATIENT
Start: 2021-06-28 | End: 2021-10-12 | Stop reason: HOSPADM

## 2021-06-28 RX ORDER — DEXMEDETOMIDINE HYDROCHLORIDE 4 UG/ML
0.2-0.7 INJECTION, SOLUTION INTRAVENOUS CONTINUOUS
Status: DISCONTINUED | OUTPATIENT
Start: 2021-06-28 | End: 2021-06-30

## 2021-06-28 RX ORDER — NOREPINEPHRINE BITARTRATE 0.06 MG/ML
.01-.6 INJECTION, SOLUTION INTRAVENOUS CONTINUOUS
Status: DISCONTINUED | OUTPATIENT
Start: 2021-06-28 | End: 2021-06-28

## 2021-06-28 RX ORDER — LACTULOSE 10 G/15ML
20 SOLUTION ORAL
Status: DISCONTINUED | OUTPATIENT
Start: 2021-06-28 | End: 2021-06-30

## 2021-06-28 RX ORDER — LACTULOSE 10 G/15ML
100 SOLUTION ORAL
Status: DISCONTINUED | OUTPATIENT
Start: 2021-06-28 | End: 2021-06-30

## 2021-06-28 RX ADMIN — Medication 0.06 MCG/KG/MIN: at 19:12

## 2021-06-28 ASSESSMENT — VISUAL ACUITY: OU: BASELINE

## 2021-06-28 ASSESSMENT — ACTIVITIES OF DAILY LIVING (ADL): ADLS_ACUITY_SCORE: 13

## 2021-06-28 ASSESSMENT — MIFFLIN-ST. JEOR: SCORE: 1699.75

## 2021-06-29 ENCOUNTER — APPOINTMENT (OUTPATIENT)
Dept: PHYSICAL THERAPY | Facility: CLINIC | Age: 31
End: 2021-06-29
Attending: INTERNAL MEDICINE
Payer: MEDICAID

## 2021-06-29 ENCOUNTER — APPOINTMENT (OUTPATIENT)
Dept: GENERAL RADIOLOGY | Facility: CLINIC | Age: 31
End: 2021-06-29
Attending: INTERNAL MEDICINE
Payer: MEDICAID

## 2021-06-29 ENCOUNTER — APPOINTMENT (OUTPATIENT)
Dept: OCCUPATIONAL THERAPY | Facility: CLINIC | Age: 31
End: 2021-06-29
Attending: INTERNAL MEDICINE
Payer: MEDICAID

## 2021-06-29 ENCOUNTER — APPOINTMENT (OUTPATIENT)
Dept: CT IMAGING | Facility: CLINIC | Age: 31
End: 2021-06-29
Attending: INTERNAL MEDICINE
Payer: MEDICAID

## 2021-06-29 LAB
ALBUMIN SERPL-MCNC: 2.7 G/DL (ref 3.4–5)
ALP SERPL-CCNC: 88 U/L (ref 40–150)
ALT SERPL W P-5'-P-CCNC: 23 U/L (ref 0–70)
ANION GAP SERPL CALCULATED.3IONS-SCNC: 9 MMOL/L (ref 3–14)
AST SERPL W P-5'-P-CCNC: 70 U/L (ref 0–45)
BASOPHILS # BLD AUTO: 0 10E9/L (ref 0–0.2)
BASOPHILS NFR BLD AUTO: 0 %
BILIRUB DIRECT SERPL-MCNC: 13.8 MG/DL (ref 0–0.2)
BILIRUB SERPL-MCNC: 19.5 MG/DL (ref 0.2–1.3)
BLD PROD TYP BPU: NORMAL
BUN SERPL-MCNC: 20 MG/DL (ref 7–30)
CALCIUM SERPL-MCNC: 7.9 MG/DL (ref 8.5–10.1)
CHLORIDE SERPL-SCNC: 103 MMOL/L (ref 94–109)
CO2 SERPL-SCNC: 25 MMOL/L (ref 20–32)
CREAT SERPL-MCNC: 2.25 MG/DL (ref 0.66–1.25)
DIFFERENTIAL METHOD BLD: ABNORMAL
EOSINOPHIL # BLD AUTO: 0.5 10E9/L (ref 0–0.7)
EOSINOPHIL NFR BLD AUTO: 1 %
ERYTHROCYTE [DISTWIDTH] IN BLOOD BY AUTOMATED COUNT: ABNORMAL % (ref 10–15)
GFR SERPL CREATININE-BSD FRML MDRD: 37 ML/MIN/{1.73_M2}
GLUCOSE SERPL-MCNC: 112 MG/DL (ref 70–99)
HCT VFR BLD AUTO: 22.8 % (ref 40–53)
HGB BLD-MCNC: 7.4 G/DL (ref 13.3–17.7)
INR PPP: 3.54 (ref 0.86–1.14)
INTERPRETATION ECG - MUSE: NORMAL
LABORATORY COMMENT REPORT: NORMAL
LYMPHOCYTES # BLD AUTO: 0 10E9/L (ref 0.8–5.3)
LYMPHOCYTES NFR BLD AUTO: 0 %
MAGNESIUM SERPL-MCNC: 2 MG/DL (ref 1.6–2.3)
MCH RBC QN AUTO: 32.6 PG (ref 26.5–33)
MCHC RBC AUTO-ENTMCNC: 32.5 G/DL (ref 31.5–36.5)
MCV RBC AUTO: 100 FL (ref 78–100)
METAMYELOCYTES # BLD: 3 10E9/L
METAMYELOCYTES NFR BLD MANUAL: 6 %
MONOCYTES # BLD AUTO: 2 10E9/L (ref 0–1.3)
MONOCYTES NFR BLD AUTO: 4 %
MRSA DNA SPEC QL NAA+PROBE: NEGATIVE
MYELOCYTES # BLD: 1 10E9/L
MYELOCYTES NFR BLD MANUAL: 2 %
NEUTROPHILS # BLD AUTO: 44 10E9/L (ref 1.6–8.3)
NEUTROPHILS NFR BLD AUTO: 87 %
NUM BPU REQUESTED: 3
PHOSPHATE SERPL-MCNC: 2.3 MG/DL (ref 2.5–4.5)
PLATELET # BLD AUTO: 143 10E9/L (ref 150–450)
POTASSIUM SERPL-SCNC: 3.3 MMOL/L (ref 3.4–5.3)
PROT SERPL-MCNC: 6.4 G/DL (ref 6.8–8.8)
RBC # BLD AUTO: 2.27 10E12/L (ref 4.4–5.9)
SARS-COV-2 RNA RESP QL NAA+PROBE: NEGATIVE
SODIUM SERPL-SCNC: 138 MMOL/L (ref 133–144)
SPECIMEN SOURCE: NORMAL
SPECIMEN SOURCE: NORMAL
TRIGL SERPL-MCNC: NORMAL MG/DL
TRIGL SERPL-MCNC: NORMAL MG/DL
WBC # BLD AUTO: 49.8 10E9/L (ref 4–11)

## 2021-06-29 PROCEDURE — 200N000002 HC R&B ICU UMMC

## 2021-06-29 PROCEDURE — 97161 PT EVAL LOW COMPLEX 20 MIN: CPT | Mod: GP

## 2021-06-29 PROCEDURE — 82248 BILIRUBIN DIRECT: CPT | Performed by: STUDENT IN AN ORGANIZED HEALTH CARE EDUCATION/TRAINING PROGRAM

## 2021-06-29 PROCEDURE — 99223 1ST HOSP IP/OBS HIGH 75: CPT | Performed by: STUDENT IN AN ORGANIZED HEALTH CARE EDUCATION/TRAINING PROGRAM

## 2021-06-29 PROCEDURE — 85610 PROTHROMBIN TIME: CPT | Performed by: STUDENT IN AN ORGANIZED HEALTH CARE EDUCATION/TRAINING PROGRAM

## 2021-06-29 PROCEDURE — 250N000009 HC RX 250: Performed by: STUDENT IN AN ORGANIZED HEALTH CARE EDUCATION/TRAINING PROGRAM

## 2021-06-29 PROCEDURE — 71045 X-RAY EXAM CHEST 1 VIEW: CPT | Mod: 26 | Performed by: RADIOLOGY

## 2021-06-29 PROCEDURE — 93005 ELECTROCARDIOGRAM TRACING: CPT

## 2021-06-29 PROCEDURE — 258N000003 HC RX IP 258 OP 636

## 2021-06-29 PROCEDURE — 74177 CT ABD & PELVIS W/CONTRAST: CPT

## 2021-06-29 PROCEDURE — 93010 ELECTROCARDIOGRAM REPORT: CPT | Performed by: INTERNAL MEDICINE

## 2021-06-29 PROCEDURE — 258N000003 HC RX IP 258 OP 636: Performed by: STUDENT IN AN ORGANIZED HEALTH CARE EDUCATION/TRAINING PROGRAM

## 2021-06-29 PROCEDURE — 97530 THERAPEUTIC ACTIVITIES: CPT | Mod: GP

## 2021-06-29 PROCEDURE — 250N000013 HC RX MED GY IP 250 OP 250 PS 637: Performed by: STUDENT IN AN ORGANIZED HEALTH CARE EDUCATION/TRAINING PROGRAM

## 2021-06-29 PROCEDURE — 250N000011 HC RX IP 250 OP 636

## 2021-06-29 PROCEDURE — 97535 SELF CARE MNGMENT TRAINING: CPT | Mod: GO

## 2021-06-29 PROCEDURE — 87641 MR-STAPH DNA AMP PROBE: CPT | Performed by: STUDENT IN AN ORGANIZED HEALTH CARE EDUCATION/TRAINING PROGRAM

## 2021-06-29 PROCEDURE — 250N000011 HC RX IP 250 OP 636: Performed by: STUDENT IN AN ORGANIZED HEALTH CARE EDUCATION/TRAINING PROGRAM

## 2021-06-29 PROCEDURE — 83735 ASSAY OF MAGNESIUM: CPT | Performed by: STUDENT IN AN ORGANIZED HEALTH CARE EDUCATION/TRAINING PROGRAM

## 2021-06-29 PROCEDURE — 74177 CT ABD & PELVIS W/CONTRAST: CPT | Mod: 26 | Performed by: RADIOLOGY

## 2021-06-29 PROCEDURE — 97165 OT EVAL LOW COMPLEX 30 MIN: CPT | Mod: GO

## 2021-06-29 PROCEDURE — U0003 INFECTIOUS AGENT DETECTION BY NUCLEIC ACID (DNA OR RNA); SEVERE ACUTE RESPIRATORY SYNDROME CORONAVIRUS 2 (SARS-COV-2) (CORONAVIRUS DISEASE [COVID-19]), AMPLIFIED PROBE TECHNIQUE, MAKING USE OF HIGH THROUGHPUT TECHNOLOGIES AS DESCRIBED BY CMS-2020-01-R: HCPCS | Performed by: STUDENT IN AN ORGANIZED HEALTH CARE EDUCATION/TRAINING PROGRAM

## 2021-06-29 PROCEDURE — 80053 COMPREHEN METABOLIC PANEL: CPT | Performed by: STUDENT IN AN ORGANIZED HEALTH CARE EDUCATION/TRAINING PROGRAM

## 2021-06-29 PROCEDURE — 85027 COMPLETE CBC AUTOMATED: CPT | Performed by: STUDENT IN AN ORGANIZED HEALTH CARE EDUCATION/TRAINING PROGRAM

## 2021-06-29 PROCEDURE — 99291 CRITICAL CARE FIRST HOUR: CPT | Mod: GC | Performed by: INTERNAL MEDICINE

## 2021-06-29 PROCEDURE — 99222 1ST HOSP IP/OBS MODERATE 55: CPT | Performed by: PHYSICIAN ASSISTANT

## 2021-06-29 PROCEDURE — 84100 ASSAY OF PHOSPHORUS: CPT | Performed by: STUDENT IN AN ORGANIZED HEALTH CARE EDUCATION/TRAINING PROGRAM

## 2021-06-29 PROCEDURE — 87640 STAPH A DNA AMP PROBE: CPT | Performed by: STUDENT IN AN ORGANIZED HEALTH CARE EDUCATION/TRAINING PROGRAM

## 2021-06-29 PROCEDURE — 71045 X-RAY EXAM CHEST 1 VIEW: CPT

## 2021-06-29 PROCEDURE — 36415 COLL VENOUS BLD VENIPUNCTURE: CPT | Performed by: STUDENT IN AN ORGANIZED HEALTH CARE EDUCATION/TRAINING PROGRAM

## 2021-06-29 PROCEDURE — 87040 BLOOD CULTURE FOR BACTERIA: CPT | Performed by: STUDENT IN AN ORGANIZED HEALTH CARE EDUCATION/TRAINING PROGRAM

## 2021-06-29 PROCEDURE — U0005 INFEC AGEN DETEC AMPLI PROBE: HCPCS | Performed by: STUDENT IN AN ORGANIZED HEALTH CARE EDUCATION/TRAINING PROGRAM

## 2021-06-29 RX ORDER — MEROPENEM 500 MG/1
500 INJECTION, POWDER, FOR SOLUTION INTRAVENOUS EVERY 24 HOURS
Status: DISPENSED | OUTPATIENT
Start: 2021-06-29 | End: 2021-07-18

## 2021-06-29 RX ORDER — POTASSIUM CHLORIDE 1.5 G/1.58G
40 POWDER, FOR SOLUTION ORAL ONCE
Status: DISCONTINUED | OUTPATIENT
Start: 2021-06-29 | End: 2021-06-29

## 2021-06-29 RX ORDER — THIAMINE HYDROCHLORIDE 100 MG/ML
100 INJECTION, SOLUTION INTRAMUSCULAR; INTRAVENOUS DAILY
Status: DISCONTINUED | OUTPATIENT
Start: 2021-06-29 | End: 2021-09-20

## 2021-06-29 RX ORDER — LANOLIN ALCOHOL/MO/W.PET/CERES
100 CREAM (GRAM) TOPICAL DAILY
Status: DISCONTINUED | OUTPATIENT
Start: 2021-06-29 | End: 2021-09-20

## 2021-06-29 RX ORDER — IOPAMIDOL 755 MG/ML
105 INJECTION, SOLUTION INTRAVASCULAR ONCE
Status: COMPLETED | OUTPATIENT
Start: 2021-06-29 | End: 2021-06-29

## 2021-06-29 RX ORDER — LACTULOSE 10 G/15ML
20 SOLUTION ORAL 3 TIMES DAILY
Status: DISCONTINUED | OUTPATIENT
Start: 2021-06-29 | End: 2021-07-07

## 2021-06-29 RX ORDER — FOLIC ACID 5 MG/ML
1 INJECTION, SOLUTION INTRAMUSCULAR; INTRAVENOUS; SUBCUTANEOUS DAILY
Status: DISCONTINUED | OUTPATIENT
Start: 2021-06-29 | End: 2021-08-12 | Stop reason: CLARIF

## 2021-06-29 RX ORDER — LANOLIN ALCOHOL/MO/W.PET/CERES
6 CREAM (GRAM) TOPICAL AT BEDTIME
Status: DISCONTINUED | OUTPATIENT
Start: 2021-06-30 | End: 2021-09-20

## 2021-06-29 RX ORDER — MIDODRINE HYDROCHLORIDE 5 MG/1
20 TABLET ORAL EVERY 8 HOURS
Status: DISCONTINUED | OUTPATIENT
Start: 2021-06-29 | End: 2021-07-01

## 2021-06-29 RX ORDER — MIDODRINE HYDROCHLORIDE 5 MG/1
10 TABLET ORAL
Status: DISCONTINUED | OUTPATIENT
Start: 2021-06-29 | End: 2021-06-29

## 2021-06-29 RX ORDER — FOLIC ACID 1 MG/1
1 TABLET ORAL DAILY
Status: DISCONTINUED | OUTPATIENT
Start: 2021-06-29 | End: 2021-09-20

## 2021-06-29 RX ADMIN — LACTULOSE 20 G: 10 SOLUTION ORAL at 08:15

## 2021-06-29 RX ADMIN — RIFAXIMIN 550 MG: 550 TABLET ORAL at 08:15

## 2021-06-29 RX ADMIN — MEROPENEM 500 MG: 500 INJECTION, POWDER, FOR SOLUTION INTRAVENOUS at 04:54

## 2021-06-29 RX ADMIN — VANCOMYCIN HYDROCHLORIDE 1750 MG: 10 INJECTION, POWDER, LYOPHILIZED, FOR SOLUTION INTRAVENOUS at 05:29

## 2021-06-29 RX ADMIN — MIDODRINE HYDROCHLORIDE 20 MG: 5 TABLET ORAL at 15:58

## 2021-06-29 RX ADMIN — FOLIC ACID 1 MG: 5 INJECTION, SOLUTION INTRAMUSCULAR; INTRAVENOUS; SUBCUTANEOUS at 08:16

## 2021-06-29 RX ADMIN — SODIUM PHOSPHATE, MONOBASIC, MONOHYDRATE 9 MMOL: 276; 142 INJECTION, SOLUTION INTRAVENOUS at 10:02

## 2021-06-29 RX ADMIN — THIAMINE HYDROCHLORIDE 100 MG: 100 INJECTION, SOLUTION INTRAMUSCULAR; INTRAVENOUS at 08:15

## 2021-06-29 RX ADMIN — RIFAXIMIN 550 MG: 550 TABLET ORAL at 00:13

## 2021-06-29 RX ADMIN — RIFAXIMIN 550 MG: 550 TABLET ORAL at 19:43

## 2021-06-29 RX ADMIN — IOPAMIDOL 105 ML: 755 INJECTION, SOLUTION INTRAVENOUS at 10:57

## 2021-06-29 RX ADMIN — MICAFUNGIN SODIUM 100 MG: 50 INJECTION, POWDER, LYOPHILIZED, FOR SOLUTION INTRAVENOUS at 02:43

## 2021-06-29 RX ADMIN — MIDODRINE HYDROCHLORIDE 20 MG: 5 TABLET ORAL at 08:14

## 2021-06-29 RX ADMIN — MEROPENEM 500 MG: 500 INJECTION, POWDER, FOR SOLUTION INTRAVENOUS at 19:43

## 2021-06-29 RX ADMIN — PHYTONADIONE 10 MG: 10 INJECTION, EMULSION INTRAMUSCULAR; INTRAVENOUS; SUBCUTANEOUS at 08:34

## 2021-06-29 RX ADMIN — LACTULOSE 20 G: 10 SOLUTION ORAL at 14:36

## 2021-06-29 ASSESSMENT — ACTIVITIES OF DAILY LIVING (ADL)
ADLS_ACUITY_SCORE: 20
ADLS_ACUITY_SCORE: 20
ADLS_ACUITY_SCORE: 17
ADLS_ACUITY_SCORE: 20

## 2021-06-29 ASSESSMENT — MIFFLIN-ST. JEOR
SCORE: 1686.75
SCORE: 1698.75

## 2021-06-29 NOTE — PROGRESS NOTES
"SPIRITUAL HEALTH SERVICES  SPIRITUAL ASSESSMENT Progress Note  [facility] 4C     REFERRAL SOURCE: Initial unit  visit with patient Dax Villareal, per request for hospital  visit as noted in initial nursing assessment.    I checked in with pt, who is Adventism and per chart \"is requesting a \" (  Father Johnny is out until Thursday); pt declined  support.    PLAN: chaplaincy always available for spiritual support if requested.    Kirt Mas M.Div. ([place]), [facility]  Staff   Pager [phone]      * [facility] remains available 24/7 for emergent requests/referrals, either by having the switchboard page the on-call  or by entering an ASAP/STAT consult in Epic (this will also page the on-call ). Routine Epic consults receive an initial response within 24 hours.*      "

## 2021-06-29 NOTE — PLAN OF CARE
ICU End of Shift Summary. See flowsheets for vital signs and detailed assessment.    Changes this shift: VSS on 2L per oxymask. Levophed titrated to MAP goal >65. Lung sounds coarse in the evening, and cleared up overnight. Pt is short of breath when laying flat and supine, but has no respiratory concerns otherwise. WBCs 50.6 on admission. Blood cultures drawn. Vanco, Merrem, and Micafungin given. Pt has somewhat slurred speech, but is alert and oriented. Skin and sclera are jaundiced. Pt ate small amounts of ice chips overnight. Swallowed well. Pt took pills crushed in applesauce. Rectal tube draining watery stool. Pt is anuric, had been receiving iHD at OSH. Pt reports feeling anxious, but demonstrates various coping techniques.    Plan: Consult with GI and Renal teams. Continue plan of care.

## 2021-06-29 NOTE — CONSULTS
"  Advanced Endoscopy/Pancreaticobiliary Consultation      Date of Admission:  6/28/2021  Reason for Admission: Sepsis, pancreatitis  Date of Consult  6/29/2021   Requesting Physician:  Tin Simpson MD           ASSESSMENT AND RECOMMENDATIONS:   Assessment:  30 year old male with a history of heavy alcohol use who was admitted to OSH 5/19 for     #. Acute necrotizing pancreatitis with presumed infected walled off necrotic collection  #. Septic shock requiring vasopressors  Unclear evolution of pancreatitis history but had a recent CT scan with large evolving necrotic collection, presumed infected. No obvious gas in collection. CT with IV contrast today showing more mature collection with enhancing wall measuring ~[id number]. Unfortunately he has profound coagulopathy (liver disease + malnutrition +sepsis) and will need somewhat urgent drainage of this collection so will defer to IR for perc drain placement rather than put him under general anesthesia for EUS cystgastrostomy.  Etiology: alcohol  Date of onset: 5/19  BISAP score on admission: unclear  Concurrent organ failure: respiratory (now extubated), renal (on HD), liver, cardiogenic (weaned off pressors 6/29)  Thromboses: none  Diabetes: no  Current nutrition access: none  Last imaging: CT scan with IV contrast today  Infection/antiinfectives:    - Meropenem (6/29 - *)   - Vancomycin (6/29 - *)   - Micafungin (6/29 - *)  -IR consult for perc drainage of necrotic collection  -NPO  -Place NJT for nutrition if not able to eat po, dietitian consult  -Continue antibiotics, follow cultures    #. Alcohol hepatitis  #. Hyperbilirubinemia  #. Ascites  #. Coagulopathy/Thrombocytopenia  #. Encephalopathy  Known heavy alcohol use. Reportedly received course of steroids at OSH for 1 week with \"some improvement\". Currently has primarily hyperbilirubinemia (19.5 today, direct 13.8) and mildly elevated AST. Unclear if he has underlying cirrhosis but certainly would fit with " alcohol hepatitis vs elevated liver tests of critical illness/sepsis. Not candidate for steroids right now. He is receiving lactulose for encephalopathy, seems oriented today. Hepatology notified of admission, ongoing discussions with them  -Trend MELD labs  -Vitamin K 10mg IV  -Consider diagnostic paracentesis (check for albumin, protein, lipase/amylase, cell count/cultures)  -Continue lactulose and titrate to 3 soft stools per day (no more than 1L)  -Alcohol abstinence    Gastroenterology follow up recommendations: TBD    Thank you for involving us in this patient's care. Please do not hesitate to contact the GI service with any questions or concerns.     Pt seen and care plan discussed with Dr. Chacko, GI staff physician.    Brianna Norwood PA-C  Advanced Endoscopy/Pancreaticobiliary GI Service  [facility]  Text Page  -------------------------------------------------------------------------------------------------------------------       Reason for Consultation:   pancreatitis           History of Present Illness:   Patient seen and examined at 0830. History is obtained from the medical record (primarily the admission H&P). Patient encephalopathic and unable to obtain meaningful history.    Dax Villareal is a 30 year old male with a PMH significant for alcohol abuse who was admitted to OSH on 5/18 for abdominal pain, nausea and vomiting. He was intubated in the emergency department for respiratory failure (had audible stridor in triage), treated for sepsis of unclear source, diagnosed with alcohol hepatitis - labs on presentation included Tbili 8.2, alk phos 309, ALT 95 and , calcium 13.4, lactic acid 7.7. Renal function was normal. His lipase was normal on admission to the ED as well. He was transferred to [facility] in [place], apparently treated with a course of steroids. He had a CT scan on 6/19 which showed possible necrotizing pancreatitis with repeat CT  "on 6/25 showing peripancreatic fluid collection consistent with complications from pancreatitis as well as 9cm soft tissue or hemorrhagic structure near pancreatic head concerning for possible internal hemorrhage of pseudocyst. Patient was treated with multiple antibiotics (including Cefepime, Metronidazole and Ciprofloxacin). Transferred to [facility] for evaluation by interventional GI for possible necrosectomy.     The patient tells me that he had a feeding tube for a short time at [facility] that he pulled out when he was transferred here. He hasn't been eating very much \"they told me not to\".               Past Medical History:   Reviewed and edited as appropriate  Past Medical History:   Diagnosis Date     ADHD 2008            Past Surgical History:   Reviewed and edited as appropriate   Past Surgical History:   Procedure Laterality Date     ORTHOPEDIC SURGERY Right     fracture repair              Social History:   The patient lives in [place], [place] with his mother    Alcohol: notes report up to 1.75L vodka per day  Tobacco: unknown  Illicit drugs: unknown           Family History:   Unable to obtain due to encephalopathy         Allergies:   Reviewed and edited as appropriate   No Known Allergies         Medications:     Current Facility-Administered Medications   Medication     Daily 2 GRAM acetaminophen limit, unless fulminent liver failure or transaminases greater than or equal to 300 - 400, then none     dexmedetomidine (PRECEDEX) 400 mcg in 0.9% sodium chloride 100 mL     glucose gel 15-30 g    Or     dextrose 50 % injection 25-50 mL    Or     glucagon injection 1 mg     folic acid (FOLVITE) tablet 1 mg    Or     folic acid injection 1 mg     lactulose (CHRONULAC) solution 20 g     lactulose (CHRONULAC) solution 20 g    Or     lactulose (CHRONULAC) solution for enema prep 100 g     [START ON 6/30/2021] melatonin tablet 6 mg     meropenem (MERREM) 500 mg vial to attach to  mL bag for ADULTS or 25 mL bag " for PEDS     micafungin (MYCAMINE) 100 mg in sodium chloride 0.9 % 100 mL intermittent infusion     midodrine (PROAMATINE) tablet 20 mg     norepinephrine (LEVOPHED) 16 mg in  mL infusion MAX CONC CENTRAL LINE     [START ON 6/30/2021] pantoprazole (PROTONIX) IV push injection 40 mg     [START ON 6/30/2021] phytonadione (AQUA-MEPHYTON) 10 mg in sodium chloride 0.9 % 50 mL intermittent infusion     rifaximin (XIFAXAN) tablet 550 mg     thiamine (B-1) tablet 100 mg    Or     thiamine (B-1) injection 100 mg     vancomycin place smith - receiving intermittent dosing             Review of Systems:     Unable to obtain due to encephalopathy         Physical Exam:   Temp: 97.7  F (36.5  C) Temp src: Oral BP: 109/70 Pulse: 120   Resp: 22 SpO2: 93 % O2 Device: Oxymask Oxygen Delivery: 2 LPM  Wt:   Wt Readings from Last 2 Encounters:   06/29/21 78.9 kg (173 lb 15.1 oz)   05/18/21 84.5 kg (186 lb 4.8 oz)        General: ill, cachectic appearing male in NAD. Alert, oriented to person and place  HEENT: Head is AT/NC. Sclera +icteric. No conjunctival injection.  Oropharynx is clear, moist and w/o exudate or lesions.  Neck: No masses or thyromegaly.  Lungs: Clear to auscultation bilaterally.   Heart: tachycardic but regular    Abdomen: Soft, distended, tender diffusely  BS +.  +hepatosplenomegaly. No rebound or peritoneal signs  Extremities: WWP, no pedal edema.  Heme/Lymph: No cervical or supraclavicular adenopathy.   Skin: ++jaundice, no rash  Drains: green liquid stool in rectal tube           Data:   Labs and imaging below were independently reviewed and interpreted    LAB WORK:    BMP  Recent Labs   Lab 06/29/21  0410 06/28/21  1839    134   POTASSIUM 3.3* 3.4   CHLORIDE 103 101   JANENE 7.9* 8.2*   CO2 25 27   BUN 20 14   CR 2.25* 1.78*   * 119*     CBC  Recent Labs   Lab 06/29/21  0410 06/28/21  1839   WBC 49.8* 50.6*   RBC 2.27* 2.32*   HGB 7.4* 7.6*   HCT 22.8* 23.4*    101*   MCH 32.6 32.8   MCHC  32.5 32.5   RDW Dimorphic population - unable to calculate Dimorphic population - unable to calculate   * 127*     INR  Recent Labs   Lab 06/29/21  0410 06/28/21 1839   INR 3.54* 3.76*     LFTs  Recent Labs   Lab 06/29/21 0410 06/28/21 1839   ALKPHOS 88 90   AST 70* 74*   ALT 23 23   BILITOTAL 19.5* 21.3*   PROTTOTAL 6.4* 6.8   ALBUMIN 2.7* 3.0*      PANC  Recent Labs   Lab 06/28/21 1839   LIPASE 199       IMAGING:  reived OS imaging        =======================================================================

## 2021-06-29 NOTE — CONSULTS
Nephrology Initial Consult  June 29, 2021      Dax Villareal MRN:[id number] YOB: 1990  Date of Admission:6/28/2021  Primary care provider: Brianna Tim  Requesting physician: Tin Simpson MD    ASSESSMENT AND RECOMMENDATIONS:     1 RADHA - Creat today 2.2, up from 1.7 yesterday.  ml. Last HD was potentially 6/26/21. He has not had any OP HD at this time. Has been receiving inpatient HD at [facility] prior to transfer to [facility]   - Etiology of RADHA felt to be JORGE L, abx, pancreatitis, sepsis, HRS   - Has Right TDC   - Will plan on HD tomorrow    - Had contrast today for evaluation of his pancreatitis.    - CRRT/HD consent obtained from sister Noni per phone   - TB is 19.5 which would also contribute to bilirubin toxicity   - Monitor for any recovery with daily chemistry labs/strict I/O and daily weights    2. Volume status - Hypervolemia with hypoxia/ascites, on supplemental oxygen. Has bilateral small pleural effusions at lung bases on CT today.   Weight will be unreliable given large ascites. SBP ~100/   - Strict I/O please    3. CV - Shock 2/2 Sepsis: Echo on 6/10/21 showed EF of > 70% w/WMA. On Levo 0.02 mcg and Midodrine 10 mg TID. SBP ~ 100/   - Per ICU    4. ETOH hepatitis/ recurrent ascites, HE - Patient with heavy ETOH use. Last ETOH unknown. Currently on Lactulose/Rifax, thiamine, Folate. T bili 19.5   - Per GI    5. Electrolytes - No acute concerns. K 3.3, Na 138   - K replacement ordered by ICU    6.  Acid base - No acute concerns. Bicarb 25    7. BMD - Ca 7.9, Phos 2.3, albumin 2.7   - Will correct Ca on HD    8. Antimicrobials - Meropenem, Micafungin, Vanco. BC NTD  WBC 49.8, Afebrile    Recommendations were communicated to primary team directly    Seen and discussed with Dr. Connor Toledo, NP   [phone]    REASON FOR CONSULT: RADHA    HISTORY OF PRESENT ILLNESS:  Admitting provider and nursing notes reviewed  Dax Villareal is a 30 year old male with h/o ETOH hepatitis,  end stage liver disease, RADHA on HD, transferred from [facility] in [place] for septic shock on 6/28/21 for treatment of necrotizing pancreatitis and decompensated liver disease. He was initially admitted to [facility] 5/19/21.     Per sister Noni patient was started on HD at [facility] approx 2 wks ago and has remained in the inpatient setting. Last HD run Saturday?    PAST MEDICAL HISTORY:  Reviewed per Komal/sister Noni on 06/29/2021     Past Medical History:   Diagnosis Date     ADHD 2008       Past Surgical History:   Procedure Laterality Date     ORTHOPEDIC SURGERY Right     fracture repair        MEDICATIONS:  PTA Meds  Prior to Admission medications    Not on File      Current Meds    folic acid  1 mg Oral Daily    Or     folic acid  1 mg Intravenous Daily     lactulose  20 g Oral or NG Tube TID     [START ON 6/30/2021] melatonin  6 mg Oral At Bedtime     meropenem  500 mg Intravenous Q24H     micafungin  100 mg Intravenous Q24H     midodrine  20 mg Oral Q8H     [START ON 6/30/2021] pantoprazole (PROTONIX) IV  40 mg Intravenous Daily with breakfast     [START ON 6/30/2021] phytonadione  10 mg Intravenous Daily     rifaximin  550 mg Oral BID     vitamin B1  100 mg Oral Daily    Or     thiamine  100 mg Intravenous Daily     vancomycin place smith - receiving intermittent dosing  1 each Intravenous See Admin Instructions     Infusion Meds    - MEDICATION INSTRUCTIONS -       dexmedetomidine       norepinephrine Stopped (06/29/21 0815)       ALLERGIES:    No Known Allergies    REVIEW OF SYSTEMS:  A comprehensive of systems was negative except as noted above.    SOCIAL HISTORY:   Social History     Socioeconomic History     Marital status: Single     Spouse name: Not on file     Number of children: Not on file     Years of education: Not on file     Highest education level: Not on file   Occupational History     Not on file   Social Needs     Financial resource strain: Not on file     Food insecurity     Worry: Not on  "file     Inability: Not on file     Transportation needs     Medical: Not on file     Non-medical: Not on file   Tobacco Use     Smoking status: Current Every Day Smoker     Smokeless tobacco: Never Used   Substance and Sexual Activity     Alcohol use: Yes     Drug use: No     Sexual activity: Not on file   Lifestyle     Physical activity     Days per week: Not on file     Minutes per session: Not on file     Stress: Not on file   Relationships     Social connections     Talks on phone: Not on file     Gets together: Not on file     Attends [employer] service: Not on file     Active member of club or organization: Not on file     Attends meetings of clubs or organizations: Not on file     Relationship status: Not on file     Intimate partner violence     Fear of current or ex partner: Not on file     Emotionally abused: Not on file     Physically abused: Not on file     Forced sexual activity: Not on file   Other Topics Concern     Not on file   Social History Narrative     Not on file     FAMILY MEDICAL HISTORY:   Family History   Problem Relation Age of Onset     Attention Deficit Disorder Mother      Alcoholism Mother      Alcoholism Father      Alcoholism Brother      Alcoholism Sister      PHYSICAL EXAM:   Temp  Av.1  F (36.7  C)  Min: 97.7  F (36.5  C)  Max: 98.7  F (37.1  C)      Pulse  Av.5  Min: 98  Max: 121 Resp  Av.9  Min: 9  Max: 40  SpO2  Av.4 %  Min: 90 %  Max: 97 %       /66 (BP Location: Right arm)   Pulse 120   Temp 98  F (36.7  C) (Oral)   Resp 28   Ht 1.676 m (5' 6\")   Wt 78.9 kg (173 lb 15.1 oz)   SpO2 93%   BMI 28.08 kg/m     Date 21 07 - 21 0659   Shift 1507-3858 8995-6785 6030-1144 24 Hour Total   INTAKE   I.V. 618.88   618.88   Shift Total(mL/kg) 618.88(7.84)   618.88(7.84)   OUTPUT   Stool 150   150   Shift Total(mL/kg) 150(1.9)   150(1.9)   Weight (kg) 78.9 78.9 78.9 78.9      Admit Weight: 80.2 kg (176 lb 12.9 oz)     GENERAL APPEARANCE: Calm, " NAD, jaundiced  EYES:  scleral icterus, pupils equal  Pulmonary: lungs clear to auscultation. On supplemental oxygen   CV: tachy   - Edema - no LE edema  GI: large distended, tender. Has rectal tube  MS: no evidence of inflammation in joints, no muscle tenderness  SKIN: no rash, warm, dry, no cyanosis  NEURO: alert, but encephalopathic   ACCESS: Right TDC     LABS:   CMP  Recent Labs   Lab 06/29/21 0410 06/28/21  1839    134   POTASSIUM 3.3* 3.4   CHLORIDE 103 101   CO2 25 27   ANIONGAP 9 6   * 119*   BUN 20 14   CR 2.25* 1.78*   GFRESTIMATED 37* 49*   GFRESTBLACK 43* 57*   JANENE 7.9* 8.2*   MAG 2.0  --    PHOS 2.3*  --    PROTTOTAL 6.4* 6.8   ALBUMIN 2.7* 3.0*   BILITOTAL 19.5* 21.3*   ALKPHOS 88 90   AST 70* 74*   ALT 23 23     CBC  Recent Labs   Lab 06/29/21 0410 06/28/21  1839   HGB 7.4* 7.6*   WBC 49.8* 50.6*   RBC 2.27* 2.32*   HCT 22.8* 23.4*    101*   MCH 32.6 32.8   MCHC 32.5 32.5   RDW Dimorphic population - unable to calculate Dimorphic population - unable to calculate   * 127*     INR  Recent Labs   Lab 06/29/21 0410 06/28/21  1839   INR 3.54* 3.76*   PTT  --  97*     ABG  Recent Labs   Lab 06/28/21  1839   O2PER 2L      URINE STUDIES  Recent Labs   Lab Test 12/09/20  1817 10/06/20  1214   COLOR Light Yellow Orange   APPEARANCE Clear Clear   URINEGLC Negative Negative   URINEBILI Negative Negative   URINEKETONE Negative 40*   SG 1.010 1.021   UBLD Negative Negative   URINEPH 7.0 7.0   PROTEIN 10* 10*   NITRITE Negative Negative   LEUKEST Negative Negative   RBCU 0 1   WBCU 0 1     No lab results found.  PTH  No lab results found.  IRON STUDIES  No lab results found.    IMAGING:    EXAMINATION: CT ABDOMEN PELVIS W CONTRAST, 6/29/2021 11:10 AM     TECHNIQUE:  Helical CT images from the lung bases through the  symphysis pubis were obtained with IV contrast. Contrast dose:  iopamidol (ISOVUE-370) solution 105 mL     COMPARISON: 6/25/2021     HISTORY: Pancreatitis,  necrotizing     FINDINGS:     Abdomen and pelvis:  Sequela of necrotizing pancreatitis with large multilobulated  heterogenous fluid collection containing fat and debris located in the  central abdomen with enhancing wall measuring approximately 21.1 x  12.0 x 16.7 cm. There is some enhancement of portions of residual  pancreas. Large volume ascites.     Liver measures up to 26.4 cm in craniocaudal dimension with  heterogenous appearance of the parenchyma, mild surface nodularity and  recanalized paraumbilical vein suggestive of cirrhosis. Gallbladder is  unremarkable. No biliary dilation. Spleen measures up to 17 cm.  Adrenal glands are unremarkable. Enlarged kidneys with symmetric  hypoenhancement and lack of corticomedullary differentiation. Urinary  bladder is decompressed. Rectal tube is present. No dilated bowel.  Diffuse small bowel wall thickening with stratification and  hyperenhancement.      Splenic vein and portal veins are patent. Celiac trunk and SMA are  patent. Multiple enlarged mesenteric lymph nodes in the central  abdomen. Diffuse hazy mesenteric stranding.     Lung bases: Small right and trace left pleural effusions. Associated  passive atelectasis in the lung bases. Bibasilar intralobular septal  thickening.     Bones and soft tissues: Diffuse anasarca. No acute osseous lesion.                                                                      IMPRESSION:   1.  Sequela of necrotizing pancreatitis with peripancreatic necrotic  fluid collection, large volume abdominal ascites and mesenteric edema.  2.  Thickened, edematous small bowel wall is likely due to reactive  enteritis secondary to pancreatitis.  3.  Hepatomegaly with steatosis and mild surface nodularity,  associated with splenomegaly and recanalized paraumbilical vein,  suggestive of cirrhosis with portal hypertension.  4.  Enlarged hypoenhancing kidneys with loss of corticomedullary  differentiation, suggestive of acute renal  parenchymal disease. No  hydronephrosis or perinephric fat stranding.  5.  Small right and trace left pleural effusions, interlobular septal  thickening, and diffuse anasarca, likely related to fluid third  spacing.     I have personally reviewed the examination and initial interpretation  and I agree with the findings.     MD Tory ZAYAS, NP

## 2021-06-29 NOTE — CONSULTS
"    Interventional Radiology Consult Service Note  Patient is on IR schedule 6/30 or 7/1 for a IR drainage of left RP fluid collection and catheter placement.   Labs reviewed and INR 3.54 today.   Orders for NPO, scrubs and antibiotics (none needed pt is on meropenem and IV vancomycin) have been entered.   Medications to be held include: None   Consent via mother Astrid  (mobile)    Please contact the [place] RN [place] [phone] for estimated time of procedure.     Case discussed with IR attending Dr. Kapoor.     This is a 30-year-old male with history of alcohol use disorder with recent prolonged hospitalization [facility] document for acute alcohol hepatitis/end-stage liver disease complicated by hepatic encephalopathy, acute renal failure requiring hemodialysis, acute hypoxemic respiratory failure and septic shock 2/2 acute necrotizing pancreatitis now transferred to the [facility] ICU on 6/28 for further management of necrotizing pancreatitis with large left RP fluid collection seen on CT 6/29/2021.      GI has been consulted for possible necrosectomy and per the team there is no plans for urgent necrosectomy at this time.      Approved for drainage and catheter placement of left RP collection under CT.      D/w team to bring INR down to 1.8 and below and will work on correcting this overnight with repeat labs in the AM.     IR team will plan on touching base tomorrow morning to review labs.      Pertinent Imaging:  CT abdomen pelvis:  Reviewed    Expected date of discharge: TBD    Vitals:   /62   Pulse 121   Temp 97.7  F (36.5  C) (Oral)   Resp 18   Ht 1.676 m (5' 6\")   Wt 78.9 kg (173 lb 15.1 oz)   SpO2 95%   BMI 28.08 kg/m      Pertinent Labs:     Lab Results   Component Value Date    WBC 49.8 (H) 06/29/2021    WBC 50.6 (HH) 06/28/2021    WBC 13.2 (H) 05/18/2021     Lab Results   Component Value Date    HGB 7.4 06/29/2021    HGB 7.6 06/28/2021    HGB 16.6 05/18/2021     Lab Results "   Component Value Date     06/29/2021     06/28/2021     05/18/2021     Lab Results   Component Value Date    INR 3.54 (H) 06/29/2021    PTT 97 (H) 06/28/2021     Lab Results   Component Value Date    POTASSIUM 3.3 (L) 06/29/2021      Creatinine   Date Value Ref Range Status   06/29/2021 2.25 (H) 0.66 - 1.25 mg/dL Final       COVID-19 Antibody Results, Testing for Immunity    COVID-19 Antibody Results, Testing for Immunity   No data to display.         COVID-19 PCR Results    COVID-19 PCR Results 5/18/21 6/29/21   SARS-CoV-2 Virus Specimen Source Nasopharyngeal Nasopharyngeal   SARS-CoV-2 PCR Result NEGATIVE NEGATIVE      Comments are available for some flowsheets but are not being displayed.             María Kearney PA-C  Interventional Radiology  Pager: [phone]

## 2021-06-29 NOTE — PROGRESS NOTES
MICU Progress Note  Dax Villareal MRN: [id number]  Age: 30 year old, : 1990  06/29/21        ASSESSMENT & PLAN     Dax Villareal is a 30-year-old male with h/o alcohol use disorder and a recent prolonged hospital stay at [facility] in [place] for acute alcoholic hepatitis/end stage liver disease c/b hepatic encephalopathy, acute renal failure leading to hemodialysis, acute hypoxemic respiratory failure, and septic shock 2/2 acute necrotizing pancreatitis. He was transferred to the MICU here on 2021 to manage his necrotizing pancreatitis by the interventional GI team with possible necrosectomy.    Changes today:  - GI consulted, CT Abd/Pelvis w/ contrast (renal gave the ok on the contrast) was done  - IR consulted, retroperitoneal fluid to be drained  or      > 3 units FFP to be given at 0300  (5 units prepared) with stat INR check at 0700  - Nephrology consulted, awaiting recs  - WOC consulted for buttock wound, awaiting recs  - SLP consulted for dysphagia, awaiting recs  - Midodrine increased to 20mg TID  - Phytonadione given today, will be given  AM also  - PPI ppx started  - Weaned off of levophed  - D/c phosphorous protocol  - F/u with blood cultures    ===NEURO===  # Hepatic Encephalopathy +/- ICU Delirium  Was intermittently altered during time at [facility] 2/2 hepatic encephalopathy with possible component of ICU delirium. Also possible contributing factor include underlying septic shock. On admission to [facility] ICU , he was A&Ox3, following commands and answering questions appropriately.  he was a bit confused as to why he was here.  - C/w lactulose + rifaximin (titrate 3-5 BMs daily)  - C/w delirium precautions  - C/w melatonin 6mg at bedtime  - Avoid Benzos and Opioids as able     # Alcohol Use Disorder  # Recent History of Alcohol Withdrawal  Appears to have been actively drinking up until [facility] admission. Had alcohol withdrawal early on during that  hospitalization, which has now resolved. No concern for ongoing withdrawal.  - Consider Chem Dep Consult  - C/w folic acid and thiamine    ===CARDIOVASCULAR===  # Vasopressor Dependent Shock 2/2 Sepsis  Patient was on norepinephrine drip to maintain MAP > 65 at [facility], which was continued upon admission to [facility] ICU. Etiology of shock most likely 2/2 sepsis given profoundly elevated WBC and procalcitonin in the setting of necrotizing pancreatitis/pancreatic pseudocyst. No active signs of GI bleeding to suggest hemorrhage as a cause. Low suspicion for cardiogenic shock (normal echo findings at OSH on 6/10/2021, EF > 70% without wall motion abnormalities).   - C/w antimicrobials as below  - Weaned off of Norepinephrine drip 6/29 AM; MAP Goal > 65  - Midodrine increased from 10mg TID to 20mg TID 6/29 (previously on 20mg TID at [facility])    ===RESPIRATORY===  No active issues at this time. Oxygen sating appropriately on minimal supplemental O2. Low suspicion for primary respiratory issue.  - Aspiration precautions  - C/w supplemental oxygen to keep sats above 89%  - Continue to monitor    ===GASTROINTESTINAL/NUTRITION===  # Alcoholic Hepatitis  # End Stage Liver Disease 2/2 Alcohol Use c/b Hepatic Encephalopathy and recurrent ascites  # Necrotizing Pancreatitis w/ Pseudocyst  Patient was initially admitted over a month ago at [facility] for alcoholic hepatitis. He received steroids early in the course for 1 week with initial improvement. CT A/P (6/19) showed possible necrotizing pancreatitis. Repeat CT on 6/25 showed peripancreatic fluid collection consistent with complications from pancreatitis as well as a 9 cm soft tissue or hemorrhagic structure near pancreatic head concerning for possible internal hemorrhage of pseudocyst. Patient was treated with antibiotics (Cefepime, Metronidazole Ciprofloxacin, and Micafungin). Transferred to [facility] for evaluation by interventional GI for possible necrosectomy. MELD-Na  score: 40 on 6/29 AM  - GI consulted, recs appreciated     > CT abd/pelvis w/ IV contrast done 6/29     > Place NJT for nutrition if not able to eat po, dietitian consult     > Trend MELD labs     > Vitamin K 10mg IV     > Consider diagnostic paracentesis (check for albumin, protein, lipase/amylase, cell count/cultures)     > C/w antibiotics and lactulose (titrate to 3 soft stools per day, no more than 1L)  - IR consulted, recs appreciated     > Drainage of left RP fluid collection and catheter placement on 6/30 or 7/1     > Bring INR down to 1.8 or below, will work on correcting this overnight with repeat labs in the AM - C/w lactulose and rifaximin  - C/w lactulose + rifaximin  - Phytonadione given 6/29, second dose to be done in the AM  - PPI ppx started 6/29  - Midodrine 20mg TID    MELD-Na score: 40 at 6/29/2021  4:10 AM  MELD score: 40 at 6/29/2021  4:10 AM  Calculated from:  Serum Creatinine: 2.25 mg/dL at 6/29/2021  4:10 AM  Serum Sodium: 138 mmol/L (Rounded to 137 mmol/L) at 6/29/2021  4:10 AM  Total Bilirubin: 19.5 mg/dL at 6/29/2021  4:10 AM  INR(ratio): 3.54 at 6/29/2021  4:10 AM  Age: 30 years 7 months    # Protein Calorie Malnutrition  - SLP consulted d/t being a bit altered and dysphagic, awaiting recs  - Able to eat as tolerated  - Will be NPO before IR drainage or RP fluid    ===RENAL / FLUIDS / ELECTROLYTES===  # RADHA vs. ESRD  Baseline Cr in May 2021 was 0.82. At [facility], patient had anuric RADHA thought to be secondary to ATN from antibiotics, contrast nephropathy, pancreatitis, and/or septic shock in the setting of underlying liver disease +/- hepatorenal syndrome. Patient was started on HD while at [facility]. Unclear total number of HD runs.  - Nephrology consulted, awaiting recs  - Strict Is + Os    ===INFECTIOUS DISEASE===     # Concern for Intra-abdominal Infection (SBP vs. Necrotizing pancreatitis/pseudocyst)  Patient was treated with antibiotics (including Cefepime, Metronidazole,  Ciprofloxacin and Micafungin) at OSH. Etiology of shock most likely 2/2 sepsis given profound leukocytosis to 50.6 on admission with elevated procalcitonin. Source of infection most likely intra-abdominal from necrotizing pancreatitis/pancreatic pseudocyst (seen on CT from OSH) vs. SBP. Low suspicion for PNA, UTI or soft tissue/infections.  - C/w broad empiric abx coverage with IV Meropenem, Vancomycin and Micafungin  - Consider sputum culture if not improving  - F/u w/ blood cultures    Antimicrobials/Antivirals:  - Meropenem (6/29 - *)  - Vancomycin (6/29 - *)  - Micafungin (6/29 - *)    Micro:  Blood cultures pending    ===HEMATOLOGY===  # Profound Leukocytosis  Etiology most likely secondary to profound sepsis.  - Trend with daily CBC  - Management of Septic Shock as above     # Anemia  # Thrombocytopenia  # Coagulopathy  Etiology most likely secondary to underlying liver disease.  - 3 units of FFP to be given on 6/30 at 0300, per IR recs  - Continue to monitor for signs of bleeding  - Transfuse if hemoglobin < 7, platelet < 10 or < 30 with bleeding    ===ENDOCRINE===  No active issues    ===SKIN/MSK===  # Critical Illness Myopathy  # Deconditioning  - PT/OT consulted 6/28     # Buttock Wound  - WOC Consulted 6/29    FEN:  NPO except for meds and ice chips  Prophylaxis:  DVT: SCDs, high risk for bleeding. GI: On PPI  Family: updated sister over the phone  Disposition: Medical ICU  Code Status: Full code     Patient was discussed with staff attending, Tin Freeman MD.    Marcia Brumfield, MS4    Resident/Fellow Attestation   I, Fernando Guzman, was present with the medical/NAHUM student who participated in the service and in the documentation of the note.  I have verified the history and personally performed the physical exam and medical decision making.  I agree with the assessment and plan of care as documented in the note.      Fernando Guzman MD       Interval Events   Nursing notes  "reviewed. No acute events overnight. Has not been making any urine. Reports diffuse abdominal pain and feeling very tired.       Objective   VITAL SIGNS:  /66 (BP Location: Right arm)   Pulse 120   Temp 98  F (36.7  C) (Oral)   Resp 28   Ht 1.676 m (5' 6\")   Wt 78.9 kg (173 lb 15.1 oz)   SpO2 93%   BMI 28.08 kg/m      Intake/Output Summary (Last 24 hours) at 6/29/2021 0751  Last data filed at 6/29/2021 0700  Gross per 24 hour   Intake 1314.16 ml   Output 166 ml   Net 1148.16 ml     Wt:   Wt Readings from Last 2 Encounters:   06/29/21 78.9 kg (173 lb 15.1 oz)   05/18/21 84.5 kg (186 lb 4.8 oz)        Vent settings:   Resp: 28    BP - Mean:  [37-99] 74    Gen: NAD, a bit irritable  HEENT: scleral icterus, EOMI, head atraumatic  Cardio: tachycardic, normal s1,s2, no rubs, murmurs, or gallops  Pulm: CTA bilaterally, no wheezing, tachypnic, 2L oxymask in place  Abd: soft and diffusely tender, distended, BS+, has a rectal tube in  Ext: 1+ BLE edema  Skin: jaundice  Neuro: speech a bit slurred, motor grossly nonfocal    DIAGNOSTIC STUDIES:   BMP  Recent Labs   Lab Test 06/29/21  0410 06/28/21  1839 05/18/21  1220 01/11/21  1153 12/09/20  1837    134 127* 136  --    POTASSIUM 3.3* 3.4 2.0* 3.1* 3.3*   CHLORIDE 103 101 70* 95  --    CO2 25 27 42* 31  --    ANIONGAP 9 6 15* 10  --    LACT  --  1.9 7.7*  --  1.9   BUN 20 14 10 6*  --    CR 2.25* 1.78* 0.82 0.70  --    * 119* 89 106*  --    JANENE 7.9* 8.2* 13.4* 9.1  --    MAG 2.0  --  1.6  --   --    PHOS 2.3*  --   --   --   --      Heme   Recent Labs   Lab Test 06/29/21 0410 06/28/21 1839 05/18/21  1220   WBC 49.8* 50.6* 13.2*   ANEU  --  44.0* 12.0*   ALYM  --  0.0* 0.6*   AEOS  --  0.5 0.0   HGB 7.4* 7.6* 16.6    101* 98   * 127* 222   INR 3.54* 3.76*  --      Hepatic   Recent Labs   Lab 06/29/21 0410 06/28/21  1839   ALKPHOS 88 90   AST 70* 74*   ALT 23 23   BILITOTAL 19.5* 21.3*   PROTTOTAL 6.4* 6.8   ALBUMIN 2.7* 3.0*      Iron " No lab results found.  ABG/VBG   Recent Labs   Lab Test 06/28/21  1839   O2PER 2L   PHV 7.44*   PCO2V 39*       Urine Studies:   Recent Labs   Lab Test 12/09/20  1817 10/06/20  1214   SG 1.010 1.021   URINEPH 7.0 7.0   NITRITE Negative Negative   LEUKEST Negative Negative   WBCU 0 1   RBCU 0 1   PROTEIN 10* 10*       Microbiology:  No results found for: RS, FLUAG    Recent Labs   Lab Test 06/29/21  0559 06/29/21  0402 06/29/21  0354 05/18/21  1230 05/18/21  1219   CULT  --  No growth after 4 hours No growth after 4 hours No growth after 6 days No growth after 6 days   SDES Nares Blood Left Hand Blood Right Arm Blood Blood       Imaging:  Recent Results (from the past 24 hour(s))   XR Chest Port 1 View    Narrative    XR CHEST PORT 1 VIEW  6/29/2021 6:01 AM      HISTORY: Eval for opacities/consolidation    COMPARISON: 5/18/2021    FINDINGS:   Single AP view of the chest. Right tunneled IJ central line tip  overlying the superior cavoatrial junction. Left upper extremity PICC  tip overlying the low SVC. Trachea is midline. Cardiac silhouette is  enlarged. No pneumothorax. Small left and probable trace right pleural  effusions. Mild diffuse interstitial and airspace opacities.      Impression    IMPRESSION:   1. Tunneled right IJ central line tip overlying the superior  cavoatrial junction.  2. Cardiomegaly with mild diffuse interstitial and airspace opacities,  favor pulmonary edema and atelectasis. Superimposed infection is not  excluded.  3. Small left and probable trace right pleural effusions.    I have personally reviewed the examination and initial interpretation  and I agree with the findings.    LESLYE SCHMITT MD   CT Abdomen Pelvis w Contrast    Narrative    EXAMINATION: CT ABDOMEN PELVIS W CONTRAST, 6/29/2021 11:10 AM    TECHNIQUE:  Helical CT images from the lung bases through the  symphysis pubis were obtained with IV contrast. Contrast dose:  iopamidol (ISOVUE-370) solution 105 mL    COMPARISON:  6/25/2021    HISTORY: Pancreatitis, necrotizing    FINDINGS:    Abdomen and pelvis:  Sequela of necrotizing pancreatitis with large multilobulated  heterogenous fluid collection containing fat and debris located in the  central abdomen with enhancing wall measuring approximately 21.1 x  12.0 x 16.7 cm. There is some enhancement of portions of residual  pancreas. Large volume ascites.    Liver measures up to 26.4 cm in craniocaudal dimension with  heterogenous appearance of the parenchyma, mild surface nodularity and  recanalized paraumbilical vein suggestive of cirrhosis. Gallbladder is  unremarkable. No biliary dilation. Spleen measures up to 17 cm.  Adrenal glands are unremarkable. Enlarged kidneys with symmetric  hypoenhancement and lack of corticomedullary differentiation. Urinary  bladder is decompressed. Rectal tube is present. No dilated bowel.  Diffuse small bowel wall thickening with stratification and  hyperenhancement.     Splenic vein and portal veins are patent. Celiac trunk and SMA are  patent. Multiple enlarged mesenteric lymph nodes in the central  abdomen. Diffuse hazy mesenteric stranding.    Lung bases: Small right and trace left pleural effusions. Associated  passive atelectasis in the lung bases. Bibasilar intralobular septal  thickening.    Bones and soft tissues: Diffuse anasarca. No acute osseous lesion.      Impression    IMPRESSION:   1.  Sequela of necrotizing pancreatitis with peripancreatic necrotic  fluid collection, large volume abdominal ascites and mesenteric edema.  2.  Thickened, edematous small bowel wall is likely due to reactive  enteritis secondary to pancreatitis.  3.  Hepatomegaly with steatosis and mild surface nodularity,  associated with splenomegaly and recanalized paraumbilical vein,  suggestive of cirrhosis with portal hypertension.  4.  Enlarged hypoenhancing kidneys with loss of corticomedullary  differentiation, suggestive of acute renal parenchymal disease.  No  hydronephrosis or perinephric fat stranding.  5.  Small right and trace left pleural effusions, interlobular septal  thickening, and diffuse anasarca, likely related to fluid third  spacing.    I have personally reviewed the examination and initial interpretation  and I agree with the findings.    LISA LYNCH MD          Medications     Medications    folic acid  1 mg Oral Daily    Or     folic acid  1 mg Intravenous Daily     lactulose  20 g Oral or NG Tube TID     [START ON 6/30/2021] melatonin  6 mg Oral At Bedtime     meropenem  500 mg Intravenous Q24H     micafungin  100 mg Intravenous Q24H     midodrine  20 mg Oral Q8H     [START ON 6/30/2021] pantoprazole (PROTONIX) IV  40 mg Intravenous Daily with breakfast     [START ON 6/30/2021] phytonadione  10 mg Intravenous Daily     rifaximin  550 mg Oral BID     vitamin B1  100 mg Oral Daily    Or     thiamine  100 mg Intravenous Daily     vancomycin place smith - receiving intermittent dosing  1 each Intravenous See Admin Instructions       - MEDICATION INSTRUCTIONS -       dexmedetomidine       norepinephrine Stopped (06/29/21 0815)

## 2021-06-29 NOTE — PROGRESS NOTES
"   06/29/21 1130   Quick Adds   Type of Visit Initial PT Evaluation   Living Environment   Current Living Arrangements   (Pt states \"it depends\")   Living Environment Comments Pt states \"it depends\" when asked questions about previous living situation, then states \"I live here know\" but oriented to location (hospital in [place]). Unable to dedirect pt enough during session to obtain answers to prior living or functional levels except that he moved around ok and did not have help   Self-Care   Usual Activity Tolerance good   Current Activity Tolerance poor   Equipment Currently Used at Home none   Disability/Function   Hearing Difficulty or Deaf no   Wear Glasses or Blind no   Concentrating, Remembering or Making Decisions Difficulty yes   Concentration Management Pt alert and oriented but makes comments off topic, perseverates on visual input in room (folded pads being paperwork with the camper keys). Redirects fairly well with a little extra time   Difficulty Eating/Swallowing yes   Eating/Swallowing Management wet sounding cough after pt had sponge to wet mouth,  was sititng up about half way in bed.    Walking or Climbing Stairs Difficulty no   Dressing/Bathing Difficulty no   Toileting issues no   Change in Functional Status Since Onset of Current Illness/Injury yes   General Information   Onset of Illness/Injury or Date of Surgery 06/28/21  (Admission to [place] mid/early May?)   Referring Physician Tin Simpson MD   Patient/Family Therapy Goals Statement (PT) get stronger   Pertinent History of Current Problem (include personal factors and/or comorbidities that impact the POC) 30 year old male with PMHx significant for alcohol use disorder who has had a prolonged hospitalization at [facility] in [place] for acute alcohol hepatitis/end stage liver disease complicated by hepatic encephalopathy, acute renal failure requiring hemodialysis, acute hypoxemic respiratory failure and septic shock secondary to " acute necrotizing pancreatitis. Transferred to [facility] ICU on 6/28/2021 for further management of necrotizing pancreatitis by interventional GI, possible necrosectomy.   Existing Precautions/Restrictions fall   Heart Disease Risk Factors Medical history   General Observations Pt supine, agreeable to PT   Cognition   Orientation Status (Cognition) oriented x 3;oriented to;person;place;time  (off topic comments at times)   Affect/Mental Status (Cognition) confused;flat/blunted affect  (visual interpretation of items in room not all correct)   Follows Commands (Cognition) follows one-step commands;25-49% accuracy;delayed response/completion;increased processing time needed;physical/tactile prompts required;repetition of directions required;verbal cues/prompting required   Behavioral Issues overwhelmed easily;difficulty managing stress   Safety Deficit (Cognition) moderate deficit;insight into deficits/self-awareness   Posture    Posture Forward head position;Protracted shoulders   Range of Motion (ROM)   ROM Comment Ankles to neutral, B LEs AROM 50%   Strength   Strength Comments B LEs weak acheiving 50% AROM with some A   Bed Mobility   Comment (Bed Mobility) mod A to roll B, lift to chair   Transfers   Transfer Safety Comments lift to chair, declined sitting forward on chair unsupported   Balance   Balance Comments uable to assess sitting   Clinical Impression   Criteria for Skilled Therapeutic Intervention yes, treatment indicated   PT Diagnosis (PT) impaired functional mobility   Influenced by the following impairments decreased strength, ROM, balance, inc pain   Functional limitations due to impairments decreased I with transfers, gait, bed mob, barriern avigation, ADLs   Clinical Presentation Evolving/Changing   Clinical Presentation Rationale clinical judgement   Clinical Decision Making (Complexity) moderate complexity   Therapy Frequency (PT) 5x/week   Predicted Duration of Therapy Intervention (days/wks) 7/21/21    Planned Therapy Interventions (PT) balance training;bed mobility training;gait training;home exercise program;neuromuscular re-education;patient/family education;ROM (range of motion);stair training;strengthening;stretching;transfer training   Risk & Benefits of therapy have been explained evaluation/treatment results reviewed;care plan/treatment goals reviewed;risks/benefits reviewed;participants voiced agreement with care plan;participants included;patient   PT Discharge Planning    PT Discharge Recommendation (DC Rec) Transitional Care Facility   PT Rationale for DC Rec Pt is significantly below baseline mobility and will need rehab stay to progress and maximize functional I and safety.    PT Brief overview of current status  Mod A rolling, HO lift to chair   Total Evaluation Time   Total Evaluation Time (Minutes) 9

## 2021-06-29 NOTE — PHARMACY-VANCOMYCIN DOSING SERVICE
Pharmacy Vancomycin Initial Note  Date of Service 2021  Patient's  1990  30 year old, male    Indication: Intra-abdominal infection and Sepsis    Current estimated CrCl = Estimated Creatinine Clearance: 59.9 mL/min (A) (based on SCr of 1.78 mg/dL (H)).    Creatinine for last 3 days  2021:  6:39 PM Creatinine 1.78 mg/dL    Recent Vancomycin Level(s) for last 3 days  No results found for requested labs within last 72 hours.      Vancomycin IV Administrations (past 72 hours)      No vancomycin orders with administrations in past 72 hours.                Nephrotoxins and other renal medications (From now, onward)    Start     Dose/Rate Route Frequency Ordered Stop    21 0230  vancomycin (VANCOCIN) 1,750 mg in sodium chloride 0.9 % 500 mL intermittent infusion      1,750 mg  over 120 Minutes Intravenous ONCE 215      21 0214  vancomycin place smith - receiving intermittent dosing      1 each Intravenous SEE ADMIN INSTRUCTIONS 21 1900  norepinephrine (LEVOPHED) 16 mg in  mL infusion MAX CONC CENTRAL LINE      0.01-0.6 mcg/kg/min × 74.5 kg (Dosing Weight)  0.7-41.9 mL/hr  Intravenous CONTINUOUS 21 1859            Contrast Orders - past 72 hours (72h ago, onward)    None                Plan:  1. Start vancomycin 1750 mg (21.8 mg/kg) IV once, followed by intermittent dosing based on drug levels around hemodialysis.   2. Vancomycin monitoring method: Renal Replacement Therapy  3. Vancomycin therapeutic monitoring goal: 15-20 mg/L  4. Pharmacy will check vancomycin levels as appropriate in 1-3 Days.    5. Serum creatinine levels will be ordered daily for the first week of therapy and at least twice weekly for subsequent weeks.      Jona Osorio formerly [facility]

## 2021-06-29 NOTE — PHARMACY-VANCOMYCIN DOSING SERVICE
Admission Medication History Completed by Pharmacy    See [facility] Admission Navigator for allergy information, preferred outpatient pharmacy, prior to admission medications and immunization status.     Medication History Sources:   Medication history interview sources:  EPIC chart review and [facility] Dr. Hassan (Rx fill history website); NO patient/family interview      Changes made to PTA medication list (reason):  No medications prior to admission    Additional Information:    None    Prior to Admission medications    Not on File       Date completed: 06/29/21    Medication history completed by: Dunia Daigle RPH

## 2021-06-29 NOTE — PLAN OF CARE
"ICU End of Shift Summary. See flowsheets for vital signs and detailed assessment.    Changes this shift: Mental status waxes and wanes; oriented but will talk about illogical topics during conversation. Levo weaned off this AM, tolerated day without pressors. Remained on 2L oxymask per pt comfort d/t \"abdominal discomfort.\" Abd/pelvis CT scan done today; plan for IR procedure tomorrow. Up to chair w/ lift. Tolerating full liquid diet with poor appetite, formal speech therapy consult tomorrow. Anuric.     Plan: Receive 3 units plasma between 2-6 AM with INR recheck after infusions.     "

## 2021-06-29 NOTE — PROGRESS NOTES
06/29/21 1500   Quick Adds   Type of Visit Initial Occupational Therapy Evaluation       Present no   Language english   Living Environment   People in home parent(s)  (mom)   Living Environment Comments Pt stated he lives with his mom. Unable to state further information regarding PLOF due to confusion   Self-Care   Usual Activity Tolerance good   Current Activity Tolerance poor   Equipment Currently Used at Home none   Activity/Exercise/Self-Care Comment Pt was previously independent with ADL completion   Disability/Function   Hearing Difficulty or Deaf no   Wear Glasses or Blind no   Concentrating, Remembering or Making Decisions Difficulty no   Difficulty Communicating no   Difficulty Eating/Swallowing no   Walking or Climbing Stairs Difficulty no   Dressing/Bathing Difficulty no   Toileting issues no   Doing Errands Independently Difficulty (such as shopping) no   Fall history within last six months no   Change in Functional Status Since Onset of Current Illness/Injury yes   General Information   Onset of Illness/Injury or Date of Surgery 06/28/21   Referring Physician Severo Verma MD   Patient/Family Therapy Goal Statement (OT) To go home   Additional Occupational Profile Info/Pertinent History of Current Problem Dax Villareal is a 30 year old male with PMHx significant for alcohol use disorder who has had a prolonged hospitalization at [facility] in [place] for acute alcohol hepatitis/end stage liver disease complicated by hepatic encephalopathy, acute renal failure requiring hemodialysis, acute hypoxemic respiratory failure and septic shock secondary to acute necrotizing pancreatitis. Transferred to [facility] ICU on 6/28/2021 for further management of necrotizing pancreatitis by interventional GI, possible necrosectomy.   Existing Precautions/Restrictions fall;swallowing   Limitations/Impairments safety/cognitive   Left Upper Extremity (Weight-bearing Status) full weight-bearing  "(FWB)   Right Upper Extremity (Weight-bearing Status) full weight-bearing (FWB)   Left Lower Extremity (Weight-bearing Status) full weight-bearing (FWB)   Right Lower Extremity (Weight-bearing Status) full weight-bearing (FWB)   Heart Disease Risk Factors Medical history   General Observations and Info Activity: up with assist   Cognitive Status Examination   Orientation Status place   Affect/Mental Status (Cognitive) confused;flat/blunted affect;low arousal/lethargic   Follows Commands follows one-step commands;0-24% accuracy   Cognitive Status Comments Pt is lethargic throughout session, confused and unable to follow most commands. Pt perseverating on \"pooping\" throughout session. Will continue to monitor cognition   Visual Perception   Visual Impairment/Limitations WFL   Sensory   Sensory Quick Adds No deficits were identified   Pain Assessment   Patient Currently in Pain No   Integumentary/Edema   Integumentary/Edema no deficits were identifed   Range of Motion Comprehensive   General Range of Motion no range of motion deficits identified   Comment, General Range of Motion BUE ROM WFL   Strength Comprehensive (MMT)   Comment, General Manual Muscle Testing (MMT) Assessment Pt presents with generalized weakness   Activities of Daily Living   BADL Assessment/Intervention grooming   Grooming Assessment/Training   [place] Level (Grooming) moderate assist (50% patient effort)   Position (Grooming) supported sitting   Instrumental Activities of Daily Living (IADL)   IADL Comments Unable to gather information at this time. Pt not following commands   Clinical Impression   Criteria for Skilled Therapeutic Interventions Met (OT) yes;meets criteria;skilled treatment is necessary   OT Diagnosis decreased activity tolerance and independence with ADL completion   OT Problem List-Impairments impacting ADL problems related to;activity tolerance impaired;cognition;range of motion (ROM);strength   Assessment of Occupational " Performance 5 or more Performance Deficits   Identified Performance Deficits g/h, toileting, bathing, dressing, functional mobility and cognition   Planned Therapy Interventions (OT) ADL retraining;IADL retraining;cognition;strengthening;ROM;progressive activity/exercise   Clinical Decision Making Complexity (OT) low complexity   Therapy Frequency (OT) 3x/week   Predicted Duration of Therapy 2 weeks   Anticipated Equipment Needs Upon Discharge (OT) other (see comments)  (TBD)   Risk & Benefits of therapy have been explained evaluation/treatment results reviewed;care plan/treatment goals reviewed;risks/benefits reviewed;current/potential barriers reviewed;participants voiced agreement with care plan;participants included;patient   OT Discharge Planning    OT Discharge Recommendation (DC Rec) Transitional Care Facility   OT Rationale for DC Rec Pt is below baseline and would benefit from continued skilled therapy to increase activity tolerance and independence with ADL completion   OT Brief overview of current status  OH lift   Total Evaluation Time (Minutes)   Total Evaluation Time (Minutes) 5

## 2021-06-29 NOTE — PLAN OF CARE
Shift durration: [id number]    Neuro/LOC:  alert, oriented, precedex stopped upon admission.  Cards: S1, S2. Pt arrived on Levophed at 5mcg/min, started on 0.06 mcg/kg/min upon arrival. Palpable pulses.   Pulm: LS coarse/diminished. Pt arrived on 4 LPM and is currently on 2 LPM. Per OSH RN Pt uses BiPAP 35% 12/5 with sleep. No respiratory concerns at this time.  GI/: NPO. Anuric, iHD currently, hx of CRRT at [facility]. Frequent large loose stools, pt on lactulose, rectal tube placed upon admission.   Skin: open area noted in gluteal fold upon admission, not actively bleeding, mepilex placed, paracentesis site to L abdomen oozing serous yellow fluid, mepilex placed, wound RN will be ordered.  Mobility: L/R repositioning in bed, bed alarm on.  Vasc access/Drains/Device: L UE PICC.    Special/Event: Admission to [facility] 4C. Continue to monitor.      Admitted/transferred from: [facility] ICU, Tanesha RN gave report.  Reason for admission/transfer: medical management for pancreatitis.  Patient status upon admission/transfer: See above.  Interventions: settled in room, MICU 2 received pt. Wound RN consulted for OA's.  2 RN skin assessment: completed by Writer and Rommel 4C RN.  Result of skin assessment and interventions/actions: OA to gluteal fold, mepilex placed. Paracentesis site oozing serous yellow fluid, mepilex placed.  Height, weight, drug calc weight: done  Patient belongings: at bedside.  MDRO education (if applicable): N/A

## 2021-06-29 NOTE — PHARMACY-ADMISSION MEDICATION HISTORY
Admission Medication History Completed by Pharmacy    See [facility] Admission Navigator for allergy information, preferred outpatient pharmacy, prior to admission medications and immunization status.     Medication History Sources:   Medication history interview sources:  EPIC chart review and [facility] Dr. Hassan (Rx fill history website); NO patient/family interview      Changes made to PTA medication list (reason):  No medications prior to admission    Additional Information:    None    Prior to Admission medications    Not on File       Date completed: 06/29/21    Medication history completed by: Dunia Daigle RPH

## 2021-06-29 NOTE — H&P
MEDICAL ICU H&P  06/28/2021    Date of Hospital Admission: 6/28/2021  Date of ICU Admission: 6/28/2021  Reason for Critical Care Admission: Septic Shock 2/2 Necrotizing Pancreatitis - GI eval for possible necrosectomy  Date of Service (when I saw the patient): 06/28/2021    ASSESSMENT: Dax Villareal is a 30 year old male with PMHx significant for alcohol use disorder who has had a prolonged hospitalization at [facility] in [place] for acute alcohol hepatitis/end stage liver disease complicated by hepatic encephalopathy, acute renal failure requiring hemodialysis, acute hypoxemic respiratory failure and septic shock secondary to acute necrotizing pancreatitis. Transferred to [facility] ICU on 6/28/2021 for further management of necrotizing pancreatitis by interventional GI, possible necrosectomy.    PLAN:    Neuro:  # Acute Toxic Metabolic Encephalopathy 2/2 Hepatic Encephalopathy +/- ICU Delirium  Patient noted to have been intermittently altered during his prolonged hospitalization at [facility] in [place], which was attributed to hepatic encephalopathy with possible component of ICU delirium. Also possible contributing factor include underlying septic shock. On admission to [facility] ICU 6/29, patient is A&Ox3, following commands and answering questions appropriately.  - Management of HE w/ lactulose + rifaximin  (titrate 3-5BMs daily)  - Management of Septic Shock as below  - Delirium Precautions  - Melatonin 6mg QHS  - Avoid Benzos and Opioids as able    # Alcohol Use Disorder  # Recent History of Alcohol Withdrawal  Appears to have been actively drinking up until being admitted at [facility] in [place] and had alcohol withdrawal early on during that hospitalization, which has now resolved. No concern for ongoing withdrawal.  - Consider Chem Dep Consult  - Folic Acid, Thiamine    Pulmonary:  No active issues at this time. Oxygen sating appropriately on minimal supplemental O2. Low suspicion for  primary respiratory issue at this time.  - CXR in the AM  - Continue to monitor    Cardiovascular:  # Vasopressor Dependent Shock 2/2 Sepsis  Upon arrival to [facility] ICU 6/29 from a prolonged hospital course at [facility] in [place], patient was on norepinephrine drip to maintain MAP > 65. Etiology of shock most likely secondary to sepsis given profoundly elevated WBC and procalcitonin in the setting of necrotizing pancreatitis/pancreatic pseudocyst. No signs of GI bleeding to suggest hemorrhage as a cause at this time. Low suspicion for cardiogenic shock given normal echo findings done at OSH on 6/10/2021 showing EF > 70% without wall motion abnormalities.   - Antimicrobials as below  - Norepinephrine Drip; MAP Goal > 65  - Midodrine 10mg TID (previously on 20mg TID at [facility])    GI/Nutrition:  # Alcoholic Hepatitis  # End Stage Liver Disease 2/2 Alcohol Use c/b Hepatic Encephalopathy and recurrent ascites  # Necrotizing Pancreatitis w/ Pseudocyst  Per review of paper records, patient was initially admitted over a month ago for alcoholic hepatitis and did receive steroids early in the course for 1 week with initial improvement. CT A/P (6/19) showed possible necrotizing pancreatitis with repeat CT on 6/25 showing peripancreatic fluid collection consistent with complications from pancreatitis as well as 9cm soft tissue or hemorrhagic structure near pancreatic head concerning for possible internal hemorrhage of pseudocyst. Patient was treated with multiple antibiotics (including Cefepime, Metronidazole and Ciprofloxacin). Transferred to [facility] for evaluation by interventional GI for possible necrosectomy.  - Lactulose and Rifaximin (titrate 3-5BMs daily)  - Midodrine 10mg TID  - GI Consult  - Will need repeat CT A/P with Contrast in the AM (not ordered yet)   > Need to discuss with nephrology regarding whether IV Contrast is OK  - Management of Sepsis as below  - Diagnostic + Therapeutic Paracentesis in the  AM    MELD-Na score: 39 at 6/28/2021  6:39 PM  MELD score: 39 at 6/28/2021  6:39 PM  Calculated from:  Serum Creatinine: 1.78 mg/dL at 6/28/2021  6:39 PM  Serum Sodium: 134 mmol/L at 6/28/2021  6:39 PM  Total Bilirubin: 21.3 mg/dL at 6/28/2021  6:39 PM  INR(ratio): 3.76 at 6/28/2021  6:39 PM  Age: 30 years 7 months    # Protein Calorie Malnutrition  - Nutrition Consult    Renal/Fluids/Electrolytes:  # RADHA vs. ESRD  Baseline Cr as recently as May 2021 was 0.82. Per paper records that came with patient's transfer, patient had anuric RADHA thought to be secondary to ATN from antibiotics, contrast nephropathy, pancreatitis, septic shock in the setting of underlying liver disease +/- hepatorenal syndrome. Patient was started on HD while at [facility] in [place]. Unclear total number of HD runs.  - Nephrology Consult    Endocrine:  No active issues      ID:  # Concern for Intra-abdominal Infection (SBP vs. Necrotizing pancreatitis/pseudocyst)  Patient presents to [facility] MICU on 6/28 as a transfer norepinephrine to maintain MAP > 65. Patient was treated with multiple antibiotics (including Cefepime, Metronidazole, Ciprofloxacin and Micafungin). Etiology of shock most likely secondary to sepsis given profound leukocytosis to 50.6 on admission with elevated procalcitonin. Source of infection at this time is most likely intra-abdominal from necrotizing pancreatitis/pancreatic pseudocyst (seen on CT from OSH) vs. SBP. Low suspicion for PNA, UTI or soft tissue/infections.  - Follow up blood cultures  - CXR in the AM  - Obtain UA/UCx and sputum culture if able  - Broad empiric abx coverage with IV Meropenem, Vancomycin and Micafungin  - Consider ID Consult in the AM    Antimicrobials:  - Meropenem (6/29 - *)  - Vancomycin (6/29 - *)  - Micafungin (6/29 - *)      Hematology:    # Profound Leukocytosis  Etiology most likely secondary to profound sepsis. See Septic shock above under ID.  - Will add WBC Differential  -  "Management of Septic Shock as above    # Anemia  # Thrombocytopenia  # Coagulopathy  Etiology most likely secondary to underlying liver disease.  - Continue to monitor for signs of bleeding  - Transfuse if hemoglobin < 7, platelet < 10 or < 30 with bleeding    Musculoskeletal/Skin:  # Critical Illness Myopathy  # Deconditioning  - PT/OT    # Buttock Wound  - WOC Consult      General Cares/Prophylaxis:    DVT Prophylaxis: SCDs, high risk for bleeding  GI Prophylaxis: PPI  Restraints: none  Family Communication: not yet updated overnight  Code Status: Full Code    Lines/tubes/drains:  - Left Triple Lumen PICC    Disposition:  - Medical ICU    Patient seen and findings/plan discussed with medical ICU staff, Dr. Perlman.      Severo Verma MD  Internal Medicine PGY-2  Pager #: [phone]    -----------------------------------------------------------------------    HISTORY PRESENTING ILLNESS:   Dax Villareal is a 30 year old male with PMHx significant for alcohol use disorder who has had a prolonged hospitalization at [facility] in [place] for acute alcohol hepatitis/end stage liver disease complicated by hepatic encephalopathy, acute renal failure requiring hemodialysis, acute hypoxemic respiratory failure and septic shock secondary to acute necrotizing pancreatitis. Transferred to [facility] ICU on 6/28/2021 for further management of necrotizing pancreatitis by interventional GI, possible necrosectomy.    Per patient, he had been hospitalized at [facility] for >40 days for \"issues related to his liver.\" From review of paper chart, it seems that patient was initially admitted for alcohol intoxication/withdrawal and acute alcoholic hepatitis initially responsive to steroid treatment. However, decompensated and has had many complications including but not limited to acute hypoxemic respiratory failure requiring BiPAP, acute renal failure requiring HD, hepatic encphalopathy, and septic shock from presumed " necrotizing pancreatitis/pancreatic pseudocyst. He has been treated and managed by multiple antibiotics including cefepime, metronidazole, ciprofloxacin and micafungin. He was also initiated on Norepinephrine and Midodrine as well as IV Albumin to maintain MAP > 65.    Per patient, he is currently just reporting abdominal fullness (had 2 paracentesis during admission at [facility]) but otherwise he denies any fevers/chills, nausea/vomiting, chest pain, SOB, changes in BMs or any other symptoms.    Upon arrival at [facility] ICU, patient was afebrile, MAP > 65 while on norepinephrine at 0.1mcg/kg/min. His initial labs notable for WBC 50.6, Hgb 7.6, Plt 127, Cr 1.78, T Bili 21.3, procal 5.56, lactic acid 1.9 and INR 3.76.       REVIEW OF SYSTEMS:  - 10 point ROS negative unless stated in the HPI    PAST MEDICAL HISTORY:   Past Medical History:   Diagnosis Date     ADHD 2008     SURGICAL HISTORY:  Past Surgical History:   Procedure Laterality Date     ORTHOPEDIC SURGERY Right     fracture repair     SOCIAL HISTORY:  Social History     Socioeconomic History     Marital status: Single     Spouse name: Not on file     Number of children: Not on file     Years of education: Not on file     Highest education level: Not on file   Occupational History     Not on file   Social Needs     Financial resource strain: Not on file     Food insecurity     Worry: Not on file     Inability: Not on file     Transportation needs     Medical: Not on file     Non-medical: Not on file   Tobacco Use     Smoking status: Current Every Day Smoker     Smokeless tobacco: Never Used   Substance and Sexual Activity     Alcohol use: Yes     Drug use: No     Sexual activity: Not on file   Lifestyle     Physical activity     Days per week: Not on file     Minutes per session: Not on file     Stress: Not on file   Relationships     Social connections     Talks on phone: Not on file     Gets together: Not on file     Attends Islam service: Not on file      Active member of club or organization: Not on file     Attends meetings of clubs or organizations: Not on file     Relationship status: Not on file     Intimate partner violence     Fear of current or ex partner: Not on file     Emotionally abused: Not on file     Physically abused: Not on file     Forced sexual activity: Not on file   Other Topics Concern     Not on file   Social History Narrative     Not on file     FAMILY HISTORY:   Family History   Problem Relation Age of Onset     Attention Deficit Disorder Mother      Alcoholism Mother      Alcoholism Father      Alcoholism Brother      Alcoholism Sister      ALLERGIES:   No Known Allergies  MEDICATIONS:  No current facility-administered medications on file prior to encounter.   augmented betamethasone dipropionate (DIPROLENE-AF) 0.05 % external ointment, Apply topically 2 times daily  hydrOXYzine (ATARAX) 25 MG tablet, Take 1-2 tablets (25-50 mg) by mouth every 6 hours as needed for itching  ondansetron (ZOFRAN) 4 MG tablet, Take 1 tablet (4 mg) by mouth every 8 hours as needed for nausea  potassium chloride ER (K-TAB) 20 MEQ CR tablet, Take 1 tablet (20 mEq) by mouth daily      PHYSICAL EXAMINATION:  Temp:  [98  F (36.7  C)] 98  F (36.7  C)  Pulse:  [101-112] 105  Resp:  [15-40] 21  BP: ()/(28-68) 86/49  SpO2:  [90 %-95 %] 95 %     General:  male, jaundiced, alert, conversant, in no acute distress  HEENT: PERRLA, EOMI  Neuro: A&Ox2  Pulm/Resp: Clear breath sounds bilaterally without rhonchi, crackles or wheeze, breathing non-labored  CV: tachycardic, no murmurs or gallops  Abdomen: Soft, severely distended, non tender to palpation, no rebound tenderness or guarding  Extremities: trace pitting edema in lower extremities bilaterally  Incisions/Skin: jaundiced    LABS: Reviewed.   Arterial Blood Gases   No lab results found in last 7 days.     Complete Blood Count   Recent Labs   Lab 06/28/21  1839   WBC 50.6*   HGB 7.6*   *     Basic  Metabolic Panel  Recent Labs   Lab 06/28/21  1839      POTASSIUM 3.4   CHLORIDE 101   CO2 27   BUN 14   CR 1.78*   *     Liver Function Tests  Recent Labs   Lab 06/28/21  1839   AST 74*   ALT 23   ALKPHOS 90   BILITOTAL 21.3*   ALBUMIN 3.0*   INR 3.76*     Coagulation Profile  Recent Labs   Lab 06/28/21  1839   INR 3.76*   PTT 97*       IMAGING:  No results found for this or any previous visit (from the past 24 hour(s)).

## 2021-06-30 ENCOUNTER — APPOINTMENT (OUTPATIENT)
Dept: GENERAL RADIOLOGY | Facility: CLINIC | Age: 31
End: 2021-06-30
Attending: INTERNAL MEDICINE
Payer: MEDICAID

## 2021-06-30 ENCOUNTER — APPOINTMENT (OUTPATIENT)
Dept: OCCUPATIONAL THERAPY | Facility: CLINIC | Age: 31
End: 2021-06-30
Attending: INTERNAL MEDICINE
Payer: MEDICAID

## 2021-06-30 ENCOUNTER — APPOINTMENT (OUTPATIENT)
Dept: INTERVENTIONAL RADIOLOGY/VASCULAR | Facility: CLINIC | Age: 31
End: 2021-06-30
Attending: PHYSICIAN ASSISTANT
Payer: MEDICAID

## 2021-06-30 ENCOUNTER — APPOINTMENT (OUTPATIENT)
Dept: SPEECH THERAPY | Facility: CLINIC | Age: 31
End: 2021-06-30
Attending: INTERNAL MEDICINE
Payer: MEDICAID

## 2021-06-30 PROBLEM — N17.0 ACUTE KIDNEY FAILURE WITH TUBULAR NECROSIS (H): Status: ACTIVE | Noted: 2021-06-30

## 2021-06-30 LAB
ALBUMIN SERPL-MCNC: 2.8 G/DL (ref 3.4–5)
ALP SERPL-CCNC: 105 U/L (ref 40–150)
ALT SERPL W P-5'-P-CCNC: 24 U/L (ref 0–70)
ANION GAP SERPL CALCULATED.3IONS-SCNC: 14 MMOL/L (ref 3–14)
AST SERPL W P-5'-P-CCNC: 55 U/L (ref 0–45)
BASE EXCESS BLDV CALC-SCNC: 2.4 MMOL/L
BILIRUB SERPL-MCNC: 21.1 MG/DL (ref 0.2–1.3)
BLD PROD TYP BPU: NORMAL
BLD PROD TYP BPU: NORMAL
BLD UNIT ID BPU: 0
BLD UNIT ID BPU: 0
BLOOD PRODUCT CODE: NORMAL
BLOOD PRODUCT CODE: NORMAL
BPU ID: NORMAL
BPU ID: NORMAL
BUN SERPL-MCNC: 34 MG/DL (ref 7–30)
CALCIUM SERPL-MCNC: 8.4 MG/DL (ref 8.5–10.1)
CHLORIDE SERPL-SCNC: 104 MMOL/L (ref 94–109)
CO2 SERPL-SCNC: 22 MMOL/L (ref 20–32)
CREAT SERPL-MCNC: 3.02 MG/DL (ref 0.66–1.25)
ERYTHROCYTE [DISTWIDTH] IN BLOOD BY AUTOMATED COUNT: ABNORMAL % (ref 10–15)
GFR SERPL CREATININE-BSD FRML MDRD: 26 ML/MIN/{1.73_M2}
GLUCOSE BLDC GLUCOMTR-MCNC: 115 MG/DL (ref 70–99)
GLUCOSE SERPL-MCNC: 130 MG/DL (ref 70–99)
GRAM STN SPEC: NORMAL
GRAM STN SPEC: NORMAL
HBV SURFACE AB SERPL IA-ACNC: 329.32 M[IU]/ML
HBV SURFACE AG SERPL QL IA: NONREACTIVE
HCO3 BLDV-SCNC: 26 MMOL/L (ref 21–28)
HCT VFR BLD AUTO: 23.4 % (ref 40–53)
HGB BLD-MCNC: 7.7 G/DL (ref 13.3–17.7)
INR PPP: 2.13 (ref 0.86–1.14)
INR PPP: 2.22 (ref 0.86–1.14)
MAGNESIUM SERPL-MCNC: 2.4 MG/DL (ref 1.6–2.3)
MCH RBC QN AUTO: 32.9 PG (ref 26.5–33)
MCHC RBC AUTO-ENTMCNC: 32.9 G/DL (ref 31.5–36.5)
MCV RBC AUTO: 100 FL (ref 78–100)
O2/TOTAL GAS SETTING VFR VENT: ABNORMAL %
PCO2 BLDV: 37 MM HG (ref 40–50)
PH BLDV: 7.46 PH (ref 7.32–7.43)
PHOSPHATE SERPL-MCNC: 5.3 MG/DL (ref 2.5–4.5)
PLATELET # BLD AUTO: 150 10E9/L (ref 150–450)
PO2 BLDV: 36 MM HG (ref 25–47)
POTASSIUM SERPL-SCNC: 3.1 MMOL/L (ref 3.4–5.3)
PROT SERPL-MCNC: 7 G/DL (ref 6.8–8.8)
RBC # BLD AUTO: 2.34 10E12/L (ref 4.4–5.9)
SODIUM SERPL-SCNC: 138 MMOL/L (ref 133–144)
SPECIMEN SOURCE: NORMAL
TRANSFUSION STATUS PATIENT QL: NORMAL
VANCOMYCIN SERPL-MCNC: 31.2 MG/L
WBC # BLD AUTO: 48 10E9/L (ref 4–11)

## 2021-06-30 PROCEDURE — 92610 EVALUATE SWALLOWING FUNCTION: CPT | Mod: GN

## 2021-06-30 PROCEDURE — 87070 CULTURE OTHR SPECIMN AEROBIC: CPT | Performed by: STUDENT IN AN ORGANIZED HEALTH CARE EDUCATION/TRAINING PROGRAM

## 2021-06-30 PROCEDURE — 87205 SMEAR GRAM STAIN: CPT | Performed by: STUDENT IN AN ORGANIZED HEALTH CARE EDUCATION/TRAINING PROGRAM

## 2021-06-30 PROCEDURE — 49406 IMAGE CATH FLUID PERI/RETRO: CPT | Mod: GC | Performed by: RADIOLOGY

## 2021-06-30 PROCEDURE — 250N000011 HC RX IP 250 OP 636: Performed by: STUDENT IN AN ORGANIZED HEALTH CARE EDUCATION/TRAINING PROGRAM

## 2021-06-30 PROCEDURE — 84100 ASSAY OF PHOSPHORUS: CPT | Performed by: STUDENT IN AN ORGANIZED HEALTH CARE EDUCATION/TRAINING PROGRAM

## 2021-06-30 PROCEDURE — 90937 HEMODIALYSIS REPEATED EVAL: CPT

## 2021-06-30 PROCEDURE — 999N001063 HC STATISTIC THAWING COMPONENT: Performed by: STUDENT IN AN ORGANIZED HEALTH CARE EDUCATION/TRAINING PROGRAM

## 2021-06-30 PROCEDURE — 99152 MOD SED SAME PHYS/QHP 5/>YRS: CPT

## 2021-06-30 PROCEDURE — 87340 HEPATITIS B SURFACE AG IA: CPT | Performed by: INTERNAL MEDICINE

## 2021-06-30 PROCEDURE — 49406 IMAGE CATH FLUID PERI/RETRO: CPT

## 2021-06-30 PROCEDURE — 250N000009 HC RX 250: Performed by: STUDENT IN AN ORGANIZED HEALTH CARE EDUCATION/TRAINING PROGRAM

## 2021-06-30 PROCEDURE — 71045 X-RAY EXAM CHEST 1 VIEW: CPT | Mod: 26 | Performed by: RADIOLOGY

## 2021-06-30 PROCEDURE — 5A1D70Z PERFORMANCE OF URINARY FILTRATION, INTERMITTENT, LESS THAN 6 HOURS PER DAY: ICD-10-PCS

## 2021-06-30 PROCEDURE — 258N000003 HC RX IP 258 OP 636

## 2021-06-30 PROCEDURE — C9113 INJ PANTOPRAZOLE SODIUM, VIA: HCPCS | Performed by: STUDENT IN AN ORGANIZED HEALTH CARE EDUCATION/TRAINING PROGRAM

## 2021-06-30 PROCEDURE — 85610 PROTHROMBIN TIME: CPT | Performed by: STUDENT IN AN ORGANIZED HEALTH CARE EDUCATION/TRAINING PROGRAM

## 2021-06-30 PROCEDURE — 272N000192 HC ACCESSORY CR2

## 2021-06-30 PROCEDURE — 97535 SELF CARE MNGMENT TRAINING: CPT | Mod: GO | Performed by: OCCUPATIONAL THERAPIST

## 2021-06-30 PROCEDURE — 250N000013 HC RX MED GY IP 250 OP 250 PS 637: Performed by: STUDENT IN AN ORGANIZED HEALTH CARE EDUCATION/TRAINING PROGRAM

## 2021-06-30 PROCEDURE — C1769 GUIDE WIRE: HCPCS

## 2021-06-30 PROCEDURE — 250N000011 HC RX IP 250 OP 636

## 2021-06-30 PROCEDURE — 99233 SBSQ HOSP IP/OBS HIGH 50: CPT | Performed by: PHYSICIAN ASSISTANT

## 2021-06-30 PROCEDURE — 200N000002 HC R&B ICU UMMC

## 2021-06-30 PROCEDURE — 99291 CRITICAL CARE FIRST HOUR: CPT | Mod: 25 | Performed by: INTERNAL MEDICINE

## 2021-06-30 PROCEDURE — 99233 SBSQ HOSP IP/OBS HIGH 50: CPT | Performed by: STUDENT IN AN ORGANIZED HEALTH CARE EDUCATION/TRAINING PROGRAM

## 2021-06-30 PROCEDURE — P9047 ALBUMIN (HUMAN), 25%, 50ML: HCPCS

## 2021-06-30 PROCEDURE — 272N000500 HC NEEDLE CR2

## 2021-06-30 PROCEDURE — 999N000065 XR ABDOMEN PORT 1 VIEWS

## 2021-06-30 PROCEDURE — P9059 PLASMA, FRZ BETWEEN 8-24HOUR: HCPCS | Performed by: STUDENT IN AN ORGANIZED HEALTH CARE EDUCATION/TRAINING PROGRAM

## 2021-06-30 PROCEDURE — 272N000566 HC SHEATH CR3

## 2021-06-30 PROCEDURE — 83735 ASSAY OF MAGNESIUM: CPT | Performed by: STUDENT IN AN ORGANIZED HEALTH CARE EDUCATION/TRAINING PROGRAM

## 2021-06-30 PROCEDURE — 80053 COMPREHEN METABOLIC PANEL: CPT | Performed by: STUDENT IN AN ORGANIZED HEALTH CARE EDUCATION/TRAINING PROGRAM

## 2021-06-30 PROCEDURE — 258N000003 HC RX IP 258 OP 636: Performed by: STUDENT IN AN ORGANIZED HEALTH CARE EDUCATION/TRAINING PROGRAM

## 2021-06-30 PROCEDURE — C1729 CATH, DRAINAGE: HCPCS

## 2021-06-30 PROCEDURE — 99152 MOD SED SAME PHYS/QHP 5/>YRS: CPT | Mod: GC | Performed by: RADIOLOGY

## 2021-06-30 PROCEDURE — 44500 INTRO GASTROINTESTINAL TUBE: CPT | Performed by: DIETITIAN, REGISTERED

## 2021-06-30 PROCEDURE — 71045 X-RAY EXAM CHEST 1 VIEW: CPT | Mod: 76

## 2021-06-30 PROCEDURE — 999N001017 HC STATISTIC GLUCOSE BY METER IP

## 2021-06-30 PROCEDURE — 0D9W3ZZ DRAINAGE OF PERITONEUM, PERCUTANEOUS APPROACH: ICD-10-PCS | Performed by: RADIOLOGY

## 2021-06-30 PROCEDURE — 80202 ASSAY OF VANCOMYCIN: CPT | Performed by: STUDENT IN AN ORGANIZED HEALTH CARE EDUCATION/TRAINING PROGRAM

## 2021-06-30 PROCEDURE — G0463 HOSPITAL OUTPT CLINIC VISIT: HCPCS

## 2021-06-30 PROCEDURE — 272N000569 HC SHEATH CR6

## 2021-06-30 PROCEDURE — 86706 HEP B SURFACE ANTIBODY: CPT | Performed by: INTERNAL MEDICINE

## 2021-06-30 PROCEDURE — 74018 RADEX ABDOMEN 1 VIEW: CPT | Mod: 26 | Performed by: RADIOLOGY

## 2021-06-30 PROCEDURE — 82803 BLOOD GASES ANY COMBINATION: CPT | Performed by: STUDENT IN AN ORGANIZED HEALTH CARE EDUCATION/TRAINING PROGRAM

## 2021-06-30 PROCEDURE — 87075 CULTR BACTERIA EXCEPT BLOOD: CPT | Performed by: STUDENT IN AN ORGANIZED HEALTH CARE EDUCATION/TRAINING PROGRAM

## 2021-06-30 PROCEDURE — 85027 COMPLETE CBC AUTOMATED: CPT | Performed by: STUDENT IN AN ORGANIZED HEALTH CARE EDUCATION/TRAINING PROGRAM

## 2021-06-30 PROCEDURE — 71045 X-RAY EXAM CHEST 1 VIEW: CPT

## 2021-06-30 PROCEDURE — 92526 ORAL FUNCTION THERAPY: CPT | Mod: GN

## 2021-06-30 RX ORDER — NALOXONE HYDROCHLORIDE 0.4 MG/ML
0.4 INJECTION, SOLUTION INTRAMUSCULAR; INTRAVENOUS; SUBCUTANEOUS
Status: DISCONTINUED | OUTPATIENT
Start: 2021-06-30 | End: 2021-10-12 | Stop reason: HOSPADM

## 2021-06-30 RX ORDER — LACTULOSE 10 G/15ML
100 SOLUTION ORAL
Status: DISCONTINUED | OUTPATIENT
Start: 2021-06-30 | End: 2021-08-09

## 2021-06-30 RX ORDER — LACTULOSE 10 G/15ML
20 SOLUTION ORAL
Status: DISCONTINUED | OUTPATIENT
Start: 2021-06-30 | End: 2021-08-09

## 2021-06-30 RX ORDER — FLUMAZENIL 0.1 MG/ML
0.2 INJECTION, SOLUTION INTRAVENOUS
Status: DISCONTINUED | OUTPATIENT
Start: 2021-06-30 | End: 2021-06-30 | Stop reason: HOSPADM

## 2021-06-30 RX ORDER — POTASSIUM CHLORIDE 1.5 G/1.58G
40 POWDER, FOR SOLUTION ORAL ONCE
Status: COMPLETED | OUTPATIENT
Start: 2021-06-30 | End: 2021-06-30

## 2021-06-30 RX ORDER — NALOXONE HYDROCHLORIDE 0.4 MG/ML
0.2 INJECTION, SOLUTION INTRAMUSCULAR; INTRAVENOUS; SUBCUTANEOUS
Status: DISCONTINUED | OUTPATIENT
Start: 2021-06-30 | End: 2021-06-30 | Stop reason: HOSPADM

## 2021-06-30 RX ORDER — NALOXONE HYDROCHLORIDE 0.4 MG/ML
0.2 INJECTION, SOLUTION INTRAMUSCULAR; INTRAVENOUS; SUBCUTANEOUS
Status: DISCONTINUED | OUTPATIENT
Start: 2021-06-30 | End: 2021-10-12 | Stop reason: HOSPADM

## 2021-06-30 RX ORDER — FENTANYL CITRATE 50 UG/ML
25-50 INJECTION, SOLUTION INTRAMUSCULAR; INTRAVENOUS EVERY 5 MIN PRN
Status: DISCONTINUED | OUTPATIENT
Start: 2021-06-30 | End: 2021-06-30 | Stop reason: HOSPADM

## 2021-06-30 RX ORDER — LIDOCAINE HYDROCHLORIDE 20 MG/ML
5 SOLUTION OROPHARYNGEAL ONCE
Status: COMPLETED | OUTPATIENT
Start: 2021-06-30 | End: 2021-06-30

## 2021-06-30 RX ORDER — POLYETHYLENE GLYCOL 3350 17 G/17G
17 POWDER, FOR SOLUTION ORAL 2 TIMES DAILY
Status: DISCONTINUED | OUTPATIENT
Start: 2021-06-30 | End: 2021-07-01

## 2021-06-30 RX ORDER — ALBUMIN (HUMAN) 12.5 G/50ML
50 SOLUTION INTRAVENOUS
Status: DISCONTINUED | OUTPATIENT
Start: 2021-06-30 | End: 2021-06-30

## 2021-06-30 RX ORDER — HYDROMORPHONE HCL IN WATER/PF 6 MG/30 ML
0.2 PATIENT CONTROLLED ANALGESIA SYRINGE INTRAVENOUS EVERY 4 HOURS PRN
Status: DISCONTINUED | OUTPATIENT
Start: 2021-06-30 | End: 2021-07-01

## 2021-06-30 RX ORDER — NALOXONE HYDROCHLORIDE 0.4 MG/ML
0.4 INJECTION, SOLUTION INTRAMUSCULAR; INTRAVENOUS; SUBCUTANEOUS
Status: DISCONTINUED | OUTPATIENT
Start: 2021-06-30 | End: 2021-06-30 | Stop reason: HOSPADM

## 2021-06-30 RX ORDER — DEXTROSE MONOHYDRATE 100 MG/ML
INJECTION, SOLUTION INTRAVENOUS CONTINUOUS PRN
Status: DISCONTINUED | OUTPATIENT
Start: 2021-06-30 | End: 2021-07-01

## 2021-06-30 RX ADMIN — LACTULOSE 100 G: 10 SOLUTION ORAL; RECTAL at 03:21

## 2021-06-30 RX ADMIN — RIFAXIMIN 550 MG: 550 TABLET ORAL at 19:44

## 2021-06-30 RX ADMIN — MIDODRINE HYDROCHLORIDE 20 MG: 5 TABLET ORAL at 16:07

## 2021-06-30 RX ADMIN — SODIUM CHLORIDE 250 ML: 9 INJECTION, SOLUTION INTRAVENOUS at 12:21

## 2021-06-30 RX ADMIN — POTASSIUM CHLORIDE 40 MEQ: 1.5 POWDER, FOR SOLUTION ORAL at 19:54

## 2021-06-30 RX ADMIN — LACTULOSE 20 G: 10 SOLUTION ORAL at 14:10

## 2021-06-30 RX ADMIN — ALBUMIN HUMAN 50 ML: 0.25 SOLUTION INTRAVENOUS at 12:27

## 2021-06-30 RX ADMIN — SODIUM CHLORIDE 300 ML: 9 INJECTION, SOLUTION INTRAVENOUS at 12:21

## 2021-06-30 RX ADMIN — LIDOCAINE HYDROCHLORIDE 7 ML: 10 INJECTION, SOLUTION EPIDURAL; INFILTRATION; INTRACAUDAL; PERINEURAL at 08:35

## 2021-06-30 RX ADMIN — FENTANYL CITRATE 25 MCG: 50 INJECTION, SOLUTION INTRAMUSCULAR; INTRAVENOUS at 08:45

## 2021-06-30 RX ADMIN — MIDAZOLAM 0.5 MG: 1 INJECTION INTRAMUSCULAR; INTRAVENOUS at 08:30

## 2021-06-30 RX ADMIN — Medication: at 12:22

## 2021-06-30 RX ADMIN — LACTULOSE 20 G: 10 SOLUTION ORAL at 19:44

## 2021-06-30 RX ADMIN — LACTULOSE 100 G: 10 SOLUTION ORAL; RECTAL at 05:22

## 2021-06-30 RX ADMIN — LACTULOSE 100 G: 10 SOLUTION ORAL; RECTAL at 01:17

## 2021-06-30 RX ADMIN — PHYTONADIONE 10 MG: 10 INJECTION, EMULSION INTRAMUSCULAR; INTRAVENOUS; SUBCUTANEOUS at 07:40

## 2021-06-30 RX ADMIN — LIDOCAINE HYDROCHLORIDE 5 ML: 20 SOLUTION ORAL; TOPICAL at 12:00

## 2021-06-30 RX ADMIN — MEROPENEM 500 MG: 500 INJECTION, POWDER, FOR SOLUTION INTRAVENOUS at 19:45

## 2021-06-30 RX ADMIN — HYDROMORPHONE HYDROCHLORIDE 0.2 MG: 0.2 INJECTION, SOLUTION INTRAMUSCULAR; INTRAVENOUS; SUBCUTANEOUS at 11:06

## 2021-06-30 RX ADMIN — POLYETHYLENE GLYCOL 3350 17 G: 17 POWDER, FOR SOLUTION ORAL at 19:44

## 2021-06-30 RX ADMIN — MELATONIN TAB 3 MG 6 MG: 3 TAB at 21:59

## 2021-06-30 RX ADMIN — FENTANYL CITRATE 25 MCG: 50 INJECTION, SOLUTION INTRAMUSCULAR; INTRAVENOUS at 08:30

## 2021-06-30 RX ADMIN — PANTOPRAZOLE SODIUM 40 MG: 40 INJECTION, POWDER, FOR SOLUTION INTRAVENOUS at 07:35

## 2021-06-30 RX ADMIN — Medication 15 ML: at 14:10

## 2021-06-30 RX ADMIN — MICAFUNGIN SODIUM 100 MG: 50 INJECTION, POWDER, LYOPHILIZED, FOR SOLUTION INTRAVENOUS at 01:51

## 2021-06-30 RX ADMIN — FOLIC ACID 1 MG: 5 INJECTION, SOLUTION INTRAMUSCULAR; INTRAVENOUS; SUBCUTANEOUS at 07:15

## 2021-06-30 RX ADMIN — MIDAZOLAM 0.5 MG: 1 INJECTION INTRAMUSCULAR; INTRAVENOUS at 08:45

## 2021-06-30 RX ADMIN — THIAMINE HYDROCHLORIDE 100 MG: 100 INJECTION, SOLUTION INTRAMUSCULAR; INTRAVENOUS at 07:41

## 2021-06-30 ASSESSMENT — MIFFLIN-ST. JEOR
SCORE: 1676.75
SCORE: 1685.75

## 2021-06-30 ASSESSMENT — ACTIVITIES OF DAILY LIVING (ADL)
ADLS_ACUITY_SCORE: 19
ADLS_ACUITY_SCORE: 18
ADLS_ACUITY_SCORE: 19
ADLS_ACUITY_SCORE: 18

## 2021-06-30 NOTE — PROGRESS NOTES
06/30/21 1209   General Information   Onset of Illness/Injury or Date of Surgery 06/28/21   Referring Physician Fernando Guzman MD   Patient/Family Therapy Goal Statement (SLP) None stated   Pertinent History of Current Problem Dax Villareal is a 30-year-old male with h/o alcohol use disorder and a recent prolonged hospital stay at [facility] for acute alcoholic hepatitis/end stage liver disease c/b hepatic encephalopathy, acute renal failure leading to hemodialysis, acute hypoxemic respiratory failure, and septic shock 2/2 acute necrotizing pancreatitis. He was transferred to the MICU here on 6/28/2021 to manage his necrotizing pancreatitis by the interventional GI team with possible necrosectomy. Per RN, pt has tolerated small sips of water and applesauce without difficulty. Clincal swallow eval completed per MD orders.    General Observations confused and intermittently making nonsensical comments   Past History of Dysphagia No hx of dysphagia per chart review. Per RN, pt with overt s/sx of aspiration marked by coughing yesterday with liquids   Type of Evaluation   Type of Evaluation Swallow Evaluation   Oral Motor   Oral Musculature   (generalized weakness)   Structural Abnormalities none present   Mucosal Quality dry;sticky   Dentition (Oral Motor)   Dentition (Oral Motor) significant number of missing teeth;poor condition;inflammation around teeth/gums   Facial Symmetry (Oral Motor)   Facial Symmetry (Oral Motor) unable/difficult to assess   Lip Function (Oral Motor)   Lip Range of Motion (Oral Motor) unable/difficult to assess   Tongue Function (Oral Motor)   Tongue ROM (Oral Motor) unable/difficult to assess   Jaw Function (Oral Motor)   Jaw Function (Oral Motor) WNL   Cough/Swallow/Gag Reflex (Oral Motor)   Volitional Throat Clear/Cough (Oral Motor) impaired;reduced strength   Vocal Quality/Secretion Management (Oral Motor)   Vocal Quality (Oral Motor) wet/gurgly   General Swallowing  Observations   Current Diet/Method of Nutritional Intake (General Swallowing Observations, NIS) NPO   Respiratory Support (General Swallowing Observations) oxygen mask   Swallowing Evaluation Clinical swallow evaluation   Clinical Swallow Evaluation   Feeding Assistance dependent   Clinical Swallow Evaluation Textures Trialed Thin Liquids;Puree Textures   Clinical Swallow Eval: Thin Liquid Texture Trial   Mode of Presentation, Thin Liquids spoon;straw;fed by clinician   Volume of Liquid or Food Presented 2 oz   Oral Phase of Swallow WFL   Pharyngeal Phase of Swallow intact   Diagnostic Statement Pt tolerated a small amount of ice chips and thin liquids via straw with no overt s/sx of aspiration   Clinical Swallow Evaluation: Puree Solid Texture Trial   Mode of Presentation, Puree spoon;fed by clinician   Volume of Puree Presented 2 tbsp   Oral Phase, Puree WFL   Pharyngeal Phase, Puree intact   Diagnostic Statement No overt s/sx of aspiration   Swallowing Recommendations   Diet Consistency Recommendations thin liquids;full liquid diet   Supervision Level for Intake 1:1 supervision needed   Mode of Delivery Recommendations bolus size, small;food moistened;slow rate of intake   Swallowing Maneuver Recommendations alternate food and liquid intake   Monitoring/Assistance Required (Eating/Swallowing) monitor for cough or change in vocal quality with intake;optimize oral intake to minimize need for tube feeding;stop eating activities when fatigue is present;cue for finger/lingual sweep if oral pocketing present   Recommended Feeding/Eating Techniques (Swallow Eval) maintain upright sitting position for eating;maintain upright posture during/after eating for 30 minutes;minimize distractions during oral intake;provide assist with feeding;provide 6 smaller meals throughout day;set-up and prepare tray   Medication Administration Recommendations, Swallowing (SLP) via TF   General Therapy Interventions   Planned Therapy  Interventions Dysphagia Treatment   Dysphagia treatment Compensatory strategies for swallowing;Instruction of safe swallow strategies;Modified diet education   SLP Therapy Assessment/Plan   Criteria for Skilled Therapeutic Interventions Met (SLP Eval) yes;treatment indicated   SLP Diagnosis mild oropharyngeal dysphagia    Rehab Potential (SLP Eval) fair, will monitor progress closely   Therapy Frequency (SLP Eval) 5 times/wk   Predicted Duration of Therapy Intervention (SLP Eval) 2 weeks   Comment, Therapy Assessment/Plan (SLP) Limited clinical swallow eval completed per MD orders. Pt presents with mild oropharyngeal dysphagia in the setting of AMS. Unable to fully assess oral musculature due to pt's cognition, however pt with weak congested cough and mildly wet/gurgly vocal quality at baseline. Pt required encouragement to participate in swallow eval. Pt tolerated thin liquids via spoon and straw and pureed textures with no overt s/sx of aspiration. Pt declined additional PO trials despite encouragement. Recommend pt cautiously continue full liquids (thin consistency) with 1:1 supervision. Pt should be fully upright and alert for all PO, take small sips/bites, slow pacing, and alternate between consistencies. Please hold PO should pt demonstrate overt s/sx of aspiration. ST to continue to follow to assess diet tolerance and advancement as appropriate. Anticipate pt will require ST follow up at discharge.    Therapy Plan Review/Discharge Plan (SLP)   Therapy Plan Review (SLP) evaluation/treatment results reviewed;care plan/treatment goals reviewed;risks/benefits reviewed;current/potential barriers reviewed;participants voiced agreement with care plan;participants included;patient   Demonstrates Need for Referral to Another Service (SLP) clinical nutrition services/dietitian;occupational therapist;physical therapist;respiratory therapist   SLP Discharge Planning    SLP Discharge Recommendation (DC Rec) Transitional  Care Facility   SLP Rationale for DC Rec pt below baseline oropharyngeal swallow function   SLP Brief overview of current status  Recommend pt cautiously continue full liquids (thin consistency) with 1:1 supervision. Pt should be fully upright and alert for all PO, take small sips/bites, slow pacing, and alternate between consistencies. Please hold PO should pt demonstrate overt s/sx of aspiration.    Total Evaluation Time   Total Evaluation Time (Minutes) 9

## 2021-06-30 NOTE — PROGRESS NOTES
"    Interventional Radiology Progress Note  06/30/21    INR reviewed today by myah Patton to proceed with IR aspiration and drainage of left RP fluid collection with catheter placement.        Vitals:   /71   Pulse 121   Temp 98.1  F (36.7  C) (Oral)   Resp 27   Ht 1.676 m (5' 6\")   Wt 80.1 kg (176 lb 9.4 oz)   SpO2 96%   BMI 28.50 kg/m      Pertinent Labs:     Lab Results   Component Value Date    WBC 48.0 (H) 06/30/2021    WBC 49.8 (H) 06/29/2021    WBC 50.6 (HH) 06/28/2021     Lab Results   Component Value Date    HGB 7.7 06/30/2021    HGB 7.4 06/29/2021    HGB 7.6 06/28/2021     Lab Results   Component Value Date     06/30/2021     06/29/2021     06/28/2021     Lab Results   Component Value Date    INR 2.22 (H) 06/30/2021    PTT 97 (H) 06/28/2021     Lab Results   Component Value Date    POTASSIUM 3.1 (L) 06/30/2021        María Kearney PA-C  Interventional Radiology  Pager: [phone]    "

## 2021-06-30 NOTE — PLAN OF CARE
PT: Pt gone for procedure at time of schedule PT session. PT will attempt back as schedule allows.   PM: Pt in dialysis and then working with OT this pm. PT will reschedule.

## 2021-06-30 NOTE — PROCEDURES
[facility]    Procedure: IR Procedure Note    Date/Time: 6/30/2021 8:50 AM  Performed by: Cecil Mauricio MD  Authorized by: Cecil Mauricio MD     UNIVERSAL PROTOCOL   Site Marked: NA  Prior Images Obtained and Reviewed:  Yes  Required items: Required blood products, implants, devices and special equipment available    Patient identity confirmed:  Verbally with patient, arm band, provided demographic data and hospital-assigned identification number  Patient was reevaluated immediately before administering moderate or deep sedation or anesthesia  Confirmation Checklist:  Patient's identity using two indicators, relevant allergies, procedure was appropriate and matched the consent or emergent situation and correct equipment/implants were available  Time out: Immediately prior to the procedure a time out was called    Universal Protocol: the Joint Commission Universal Protocol was followed    Preparation: Patient was prepped and draped in usual sterile fashion           ANESTHESIA    Anesthesia: Local infiltration  Local Anesthetic:  Lidocaine 1% without epinephrine      SEDATION    Patient Sedated: Yes    Sedation Type:  Moderate (conscious) sedation  Vital signs: Vital signs monitored during sedation    See dictated procedure note for full details.  Findings: -Successful placement of a 24 Fr thal-quick    Specimens: fluid and/or tissue for gram stain and culture    Complications: None    Condition: Stable    Plan: -tube care per orders    PROCEDURE   Patient Tolerance:  Patient tolerated the procedure well with no immediate complications    Length of time physician/provider present for 1:1 monitoring during sedation: 15

## 2021-06-30 NOTE — CONSULTS
[facility] Nurse Inpatient Wound Assessment   Reason for consultation: Evaluate and treat  buttock wounds    Assessment  Incontinence associated dermatitis in  cleft with partial thickness open area     Status: initial assessment    Treatment Plan  Perineal cares:    -cleanse with Samson Cleanse and Protect All-in-One Protectant Lotion (this is an all in one cleanser, moisturizer, and barrier ointment).  -Apply THIN layer of Critic-Aid Thick Moisture Barrier Paste to all skin that is in contact with stool or urine.  -With repeat cleansings, DO NOT scrub Critic-Aid down to skin, just gently remove surface incontinence soil and reapply additional Critic Aid, if needed.  -If complete removal of Critic-Aid is required, use a warm wash cloth and Samson cleanser: place the warm wash cloth to the area for a minute and then cleanse, or use mineral oil/baby oil and soft disposable wash cloth.  .    Avoid brief or pull-up in bed, use only bed underpad.      Orders Written  WOC Nurse follow-up plan:weekly  Nursing to notify the Provider(s) and re-consult the WO Nurse if wound(s) deteriorates or new skin concern.    Patient History  According to provider note(s):  30 year old male with PMHx significant for alcohol use disorder who has [facility] [place] for acute alcohol hepatitis/end stage liver disease complicated by hepatic encephalopathy, acute renal failure requiring hemodialysis, acute hypoxemic respiratory failure and septic shock secondary to acute necrotizing pancreatitis. Transferred [facility] ICU on 2021 for further management of necrotizing pancreatitis by interventional GI, possible necrosectomy.    Objective Data  Containment of urine/stool: Incontinence Protocol and Incontinent pad in bed    Active Diet Order  Orders Placed This Encounter      Full Liquid Diet      Output:   I/O last 3 completed shifts:  In: 1202 [P.O.:125; I.V.:410; Other:10]  Out: 1375 [Drains:375;  Stool:1000]    Risk Assessment:   Sensory Perception: 4-->no impairment  Moisture: 3-->occasionally moist  Activity: 2-->chairfast  Mobility: 2-->very limited  Nutrition: 2-->probably inadequate  Friction and Shear: 2-->potential problem  Tyrel Score: 15                          Labs:   Recent Labs   Lab 21  0700 21  0445   ALBUMIN  --  2.8*   HGB  --  7.7*   INR 2.13* 2.22*   WBC  --  48.0*       Physical Exam  Areas of skin assessed: focused buttocks     Wound Location:  Base of  cleft, inferior to coccyx        Date of last photo 21  Wound History: present on admission.  Pt has been receiving lactulose, currently with rectal tube with balloon    Wound Base: 100 % dermis     Palpation of the wound bed: normal      Drainage: scant     Description of drainage: serous     Measurements (length x width x depth, in cm) 0.4  x 0.2  x  0.2 cm   Periwound skin: erythema- blanchable      Temperature: normal   Odor: none  Pain: no grimacing or signs of discomfort,     Interventions  Visual inspection and assessment completed   Wound Care Rationale Provide protection   Wound Care: done per plan of care  Supplies: gathered  Current off-loading measures: Pillows  Current support surface: Standard  Atmos Air mattress  Discussed plan of care with Nurse

## 2021-06-30 NOTE — PROGRESS NOTES
GASTROENTEROLOGY PROGRESS NOTE    Date of Admission: 6/28/2021  Reason for Admission: pancreatitis/hepatitis      Assessment:  30 year old male with a history of heavy alcohol use who presented to OSH 5/19 for abdominal pain, nausea and vomiting, admitted for alcohol hepatitis, ESLD, RADHA on HD, septic shock requiring pressors as well as necrotizing pancreatitis with large evolving necrotic collection. Transferred to [facility] for consideration of drainage of presumed infected necrotic collection     #. Acute necrotizing pancreatitis with presumed infected walled off necrotic collection  #. Septic shock requiring vasopressors  #. Leukocytosis  Unclear evolution of pancreatitis history but had a recent CT scan with large evolving necrotic collection, presumed infected. No obvious gas in collection. CT on admission to [facility] showing mature collection with enhancing wall measuring ~[id number]. Unfortunately he has profound coagulopathy (liver disease + malnutrition +sepsis), INR 3.8 on transfer so was given FFP and Vit K and went for IR perc drain am of 6/29. He may eventually warrant endoscopic transluminal drainage but will follow clinical course (and discuss patient at GI/surgery conference tomorrow AM)  Etiology: alcohol  Date of onset: 5/19  BISAP score on admission: unclear  Concurrent organ failure: respiratory (now extubated), renal (on HD), liver, cardiogenic (weaned off pressors 6/29)  Thromboses: none  Diabetes: no  Current nutrition access: NJT placed 6/30 at bedside  Last imaging: CT scan with IV contrast 6/29 with large walled off necrotic collection  Infection/antiinfectives:                 - Meropenem (6/29 - 6/30)                - Vancomycin (6/29 - 6/30)   - Micafungin (6/29 - *)  - Zosyn (6/30 - *)  -To discuss endoscopic transluminal drainage at GI/surgery conference tmrw AM  -Drain management per IR, await cultures from drain output  -NPO  -NJTF  -Continue antibiotics, follow cultures     #. Alcohol  "hepatitis  #. Hyperbilirubinemia  #. Ascites  #. Coagulopathy/Thrombocytopenia  #. Encephalopathy  Known heavy alcohol use. Reportedly received course of steroids at OSH for 1 week with \"some improvement\". Currently has primarily hyperbilirubinemia (19.5 today, direct 13.8) and mildly elevated AST. Unclear if he has underlying cirrhosis but certainly would fit with alcohol hepatitis vs elevated liver tests of critical illness/sepsis. Not candidate for steroids right now. He is receiving lactulose and Xifaxin for encephalopathy Hepatology notified of admission, ongoing discussions with them  -Trend MELD labs  -Vitamin K 10mg IV x 3 days  -Consider diagnostic paracentesis (check for albumin, protein, lipase/amylase, cell count/cultures)  -Continue lactulose and titrate to 3 soft stools per day (no more than 1L)  -Alcohol abstinence    The patient was discussed and plan agreed upon with GI staff, Dr Quigley.      Brianna Norwood PA-C  Advanced Endoscopy/Pancreaticobiliary GI Service  [facility]  Text Page  _______________________________________________________________      Subjective: Nursing notes and 24hr events reviewed. Patient seen and examined at 1300. Patient reports no new complaints. Currently undergoing HD in ICU room. Perc drain placed this morning with IR.    ROS:   Unable to obtain 2/2 encephalopathy    Objective:  Blood pressure 106/64, pulse 124, temperature 99.1  F (37.3  C), temperature source Oral, resp. rate 30, height 1.676 m (5' 6\"), weight 78.8 kg (173 lb 11.6 oz), SpO2 95 %.  Gen: Lyin in bed. Appears comfortable  HEENT: NCAT. Conjunctiva clear. Sclera ++icteric  CV: tachycardic, Peripheral perfusion intact  Resp: tachypneic  Abd: Soft, distended, tender diffusely, perc drain with dark red/brown liquid output, liquid yellow/green stool in rectal tube  Msk: no gross deformity  Skin:  ++ jaundice  Ext: warm, well perfused   Mental status/Psych: A&O. Asks/answers " questions quietly, somnolent    Date 06/30/21 0700 - 07/01/21 0659   Shift 9778-0706 6192-7490 1472-4544 24 Hour Total   INTAKE   I.V. 105   105   Other 10   10   Shift Total(mL/kg) 115(1.46)   115(1.46)   OUTPUT   Drains 375   375   Stool 0   0   Shift Total(mL/kg) 375(4.76)   375(4.76)   Weight (kg) 78.8 78.8 78.8 78.8         LABS:  BMP  Recent Labs   Lab 06/30/21 0445 06/29/21  0410 06/28/21  1839    138 134   POTASSIUM 3.1* 3.3* 3.4   CHLORIDE 104 103 101   JANENE 8.4* 7.9* 8.2*   CO2 22 25 27   BUN 34* 20 14   CR 3.02* 2.25* 1.78*   * 112* 119*     CBC  Recent Labs   Lab 06/30/21 0445 06/29/21 0410 06/28/21  1839   WBC 48.0* 49.8* 50.6*   RBC 2.34* 2.27* 2.32*   HGB 7.7* 7.4* 7.6*   HCT 23.4* 22.8* 23.4*    100 101*   MCH 32.9 32.6 32.8   MCHC 32.9 32.5 32.5   RDW Dimorphic population - unable to calculate Dimorphic population - unable to calculate Dimorphic population - unable to calculate    143* 127*     INR  Recent Labs   Lab 06/30/21 0700 06/30/21 0445 06/29/21 0410 06/28/21  1839   INR 2.13* 2.22* 3.54* 3.76*     LFTs  Recent Labs   Lab 06/30/21 0445 06/29/21  0410 06/28/21  1839   ALKPHOS 105 88 90   AST 55* 70* 74*   ALT 24 23 23   BILITOTAL 21.1* 19.5* 21.3*   PROTTOTAL 7.0 6.4* 6.8   ALBUMIN 2.8* 2.7* 3.0*      PANC  Recent Labs   Lab 06/28/21  1839   LIPASE 199         IMAGING:  EXAMINATION: CT ABDOMEN PELVIS W CONTRAST, 6/29/2021 11:10 AM     TECHNIQUE:  Helical CT images from the lung bases through the  symphysis pubis were obtained with IV contrast. Contrast dose:  iopamidol (ISOVUE-370) solution 105 mL     COMPARISON: 6/25/2021     HISTORY: Pancreatitis, necrotizing     FINDINGS:     Abdomen and pelvis:  Sequela of necrotizing pancreatitis with large multilobulated  heterogenous fluid collection containing fat and debris located in the  central abdomen with enhancing wall measuring approximately 21.1 x  12.0 x 16.7 cm. There is some enhancement of portions of  residual  pancreas. Large volume ascites.     Liver measures up to 26.4 cm in craniocaudal dimension with  heterogenous appearance of the parenchyma, mild surface nodularity and  recanalized paraumbilical vein suggestive of cirrhosis. Gallbladder is  unremarkable. No biliary dilation. Spleen measures up to 17 cm.  Adrenal glands are unremarkable. Enlarged kidneys with symmetric  hypoenhancement and lack of corticomedullary differentiation. Urinary  bladder is decompressed. Rectal tube is present. No dilated bowel.  Diffuse small bowel wall thickening with stratification and  hyperenhancement.      Splenic vein and portal veins are patent. Celiac trunk and SMA are  patent. Multiple enlarged mesenteric lymph nodes in the central  abdomen. Diffuse hazy mesenteric stranding.     Lung bases: Small right and trace left pleural effusions. Associated  passive atelectasis in the lung bases. Bibasilar intralobular septal  thickening.     Bones and soft tissues: Diffuse anasarca. No acute osseous lesion.                                                                      IMPRESSION:   1.  Sequela of necrotizing pancreatitis with peripancreatic necrotic  fluid collection, large volume abdominal ascites and mesenteric edema.  2.  Thickened, edematous small bowel wall is likely due to reactive  enteritis secondary to pancreatitis.  3.  Hepatomegaly with steatosis and mild surface nodularity,  associated with splenomegaly and recanalized paraumbilical vein,  suggestive of cirrhosis with portal hypertension.  4.  Enlarged hypoenhancing kidneys with loss of corticomedullary  differentiation, suggestive of acute renal parenchymal disease. No  hydronephrosis or perinephric fat stranding.  5.  Small right and trace left pleural effusions, interlobular septal  thickening, and diffuse anasarca, likely related to fluid third  spacing.

## 2021-06-30 NOTE — PROGRESS NOTES
HEMODIALYSIS TREATMENT NOTE    Date: 6/30/2021  Time: 3:29 PM    Data:  Pre Wt: 78.8 kg (173 lb 11.6 oz)   Desired Wt: 77.3 kg   Post Wt: 77.9 kg (171 lb 11.8 oz)  Weight change: 0.9 kg  Ultrafiltration - Post Run Net Total Removed (mL): 1500 mL  Vascular Access Status: RIJ Tunneled  CVC for HD:  patent  Dialyzer Rinse: Streaked, Moderate, Heavy on V-chamber  Total Blood Volume Processed: 65.9 L   Total Dialysis (Treatment) Time: 3.0 hrs  Dialysate Bath: K 4, Ca 3, Na bath 138, Bicarb: 32  Heparin: None    Lab:   Yes : HB S Ag and Ab    Assessment:  Patent RIJ Tunneled CVC HD lines.  Pre run K/Ca: 3.1/ 8.4, BUN/Cr: 34/ 3.02, Hgb / Hct: 7.7/ 23.4  HB S Ag / AB: Unknown    Interventions:  Patient dialyzed for 3 hrs via RIJ Tunneled CVC Dual Lines. Initiated HD with BFR to 400 ml/mins but decreased to 370 ml/mins with high  and clotting formation on filter and V-chamber.Gave NS bolus 100 ml via HD system to prevent clotting formation. Notified NP. Kept MAP above 65 mmHg. Tolerable for 1.5 kg off . Finished HD with rinse back, CVC NS locked with clear guards caps.     Plan:    Next run per renal team.

## 2021-06-30 NOTE — PHARMACY-CONSULT NOTE
Pharmacy Tube Feeding Consult    Medication reviewed for administration by feeding tube and for potential food/drug interactions.    Recommendation: Recommend changing the following medications to a liquid dosage form if patient becomes unable to take things by mouth: rifaximin     Pharmacy will continue to follow as new medications are ordered.

## 2021-06-30 NOTE — PROGRESS NOTES
Nephrology Progress Note  06/30/2021     Dax Villareal is a 30 year old male with h/o ETOH hepatitis, end stage liver disease, RADHA on HD, transferred from [facility] in [place] for septic shock on 6/28/21 for treatment of necrotizing pancreatitis and decompensated liver disease. He was initially admitted to [facility] 5/19/21.       Assessment & Recommendations:     1 RADHA - Creat 3.0, up from 2.2 yesterday. He is anuric. Last HD was potentially 6/26/21. He has not had any OP HD at this time. Has been receiving inpatient HD at [facility] prior to transfer to [facility]   - Etiology of RADHA felt to be JORGE L, abx, pancreatitis, sepsis, HRS   - Has Right TDC   - Having HD today. Will continue MWF schedule   - Had contrast 6/29 for evaluation of his pancreatitis.    - CRRT/HD consent obtained from sister Noni per phone   - TB is 21.1 which would also contribute to bilirubin toxicity   - Monitor for any recovery with daily chemistry labs/strict I/O and daily weights     2. Volume status - Hypervolemia with hypoxia/ascites, on supplemental oxygen. Has bilateral small pleural effusions at lung bases on CT 6/29.   Weight will be unreliable given large ascites. SBP ~teens/ Intake 1666 ml/Output: UO none, Stool 550 ml   - Strict I/O please   - UF goal 1- 1.5 kg on HD today     3. CV - Shock 2/2 Sepsis: Echo on 6/10/21 showed EF of > 70% w/WMA. Off pressor support. On Midodrine 10 mg TID. SBP ~ 100/   - Per ICU     4. ETOH hepatitis/ recurrent ascites, HE - Patient with heavy ETOH use. Last ETOH unknown. Currently on Lactulose/Rifax, thiamine, Folate. T bili 21.1   - Per GI     5. Electrolytes - Mild hypokalemia pre run with K 3.1, Na 138.    - Will be corrected on HD     6.  Acid base - No acute concerns. Bicarb 22     7. BMD - Ca 8.4, Phos 5.3, albumin 2.8   - Will correct Phos on HD     8. Antimicrobials - Meropenem, Micafungin, Vanco. BC NTD. Vanc level 31.2  WBC 48.0, Afebrile    Recommendations were communicated to primary team via  "progress note    Seen and discussed with Dr. Connor Toledo, NP   [phone]    Interval History :   Nursing and provider notes from last 24 hours reviewed.  Patient underwent retroperitoneal drain placement today  Scheduled for routine HD     Review of Systems:   I reviewed the following systems:  Unable to obtain given encephalopathy    Physical Exam:   I/O last 3 completed shifts:  In: 1695.88 [P.O.:125; I.V.:913.88]  Out: 1150 [Stool:1150]   /64   Pulse 124   Temp 99.1  F (37.3  C) (Oral)   Resp 30   Ht 1.676 m (5' 6\")   Wt 78.8 kg (173 lb 11.6 oz)   SpO2 95%   BMI 28.04 kg/m       GENERAL APPEARANCE: Calm, NAD, jaundiced  EYES:  scleral icterus, pupils equal  Pulmonary: lungs clear to auscultation. On supplemental oxygen per mask   CV: tachy   - Edema - no LE edema  GI: large distended, tender. Has rectal tube and new retroperitoneal drain  MS: no evidence of inflammation in joints, no muscle tenderness  SKIN: no rash, warm, dry, no cyanosis  NEURO: encephalopathic   ACCESS: Right TDC     Labs:   All labs reviewed by me  Electrolytes/Renal -   Recent Labs   Lab Test 06/30/21 0445 06/29/21 0410 06/28/21 1839 05/18/21  1220    138 134 127*   POTASSIUM 3.1* 3.3* 3.4 2.0*   CHLORIDE 104 103 101 70*   CO2 22 25 27 42*   BUN 34* 20 14 10   CR 3.02* 2.25* 1.78* 0.82   * 112* 119* 89   JAENNE 8.4* 7.9* 8.2* 13.4*   MAG 2.4* 2.0  --  1.6   PHOS 5.3* 2.3*  --   --        CBC -   Recent Labs   Lab Test 06/30/21 0445 06/29/21 0410 06/28/21 1839   WBC 48.0* 49.8* 50.6*   HGB 7.7* 7.4* 7.6*    143* 127*       LFTs -   Recent Labs   Lab Test 06/30/21 0445 06/29/21 0410 06/28/21 1839   ALKPHOS 105 88 90   BILITOTAL 21.1* 19.5* 21.3*   ALT 24 23 23   AST 55* 70* 74*   PROTTOTAL 7.0 6.4* 6.8   ALBUMIN 2.8* 2.7* 3.0*       Iron Panel - No lab results found.      Imaging:  XR CHEST PORT 1 VIEW  6/30/2021 4:35 AM       HISTORY: eval for worsening hypoxemia     COMPARISON: " 6/29/2021     FINDINGS:   Single AP view of the chest. Tunneled right IJ central line tip  overlying the high right atrium. Left upper extremity PICC tip  overlying the low SVC. Trachea is midline. Cardiac silhouette is  similar in size. No pneumothorax. Decreased probable trace bilateral  pleural effusions. Large unchanged mild diffuse interstitial and  airspace opacities. Mildly increased left basilar streaky opacities.  Nonspecific focal subglottic tracheal narrowing.                                                                      IMPRESSION:   1. Stable support devices.  2. Cardiomegaly with largely unchanged mild diffuse interstitial and  airspace opacities.  3. Decreased probable trace bilateral pleural effusions with mildly  increased left basilar atelectasis.  4. Nonspecific subglottic focal tracheal narrowing.     I have personally reviewed the examination and initial interpretation  and I agree with the findings.     MIGUEL WALKER MD    Current Medications:    folic acid  1 mg Oral Daily    Or     folic acid  1 mg Intravenous Daily     gelatin absorbable  1 each Topical During Hemodialysis (from stock)     lactulose  20 g Oral or NG Tube TID     lidocaine  5 mL Topical Once     melatonin  6 mg Oral At Bedtime     meropenem  500 mg Intravenous Q24H     micafungin  100 mg Intravenous Q24H     midodrine  20 mg Oral Q8H     multivitamins w/minerals  15 mL Per Feeding Tube Daily     pantoprazole (PROTONIX) IV  40 mg Intravenous Daily with breakfast     phytonadione  10 mg Intravenous Daily     polyethylene glycol  17 g Oral BID     rifaximin  550 mg Oral BID     sodium chloride (PF)  10 mL Irrigation Q8H     sodium chloride (PF)  9 mL Intracatheter During Hemodialysis (from stock)     sodium chloride (PF)  9 mL Intracatheter During Hemodialysis (from stock)     vitamin B1  100 mg Oral Daily    Or     thiamine  100 mg Intravenous Daily     vancomycin place smith - receiving intermittent dosing  1 each  Intravenous See Admin Instructions       - MEDICATION INSTRUCTIONS -       dextrose       Tory Toledo, NP

## 2021-06-30 NOTE — PLAN OF CARE
ICU End of Shift Summary. See flowsheets for vital signs and detailed assessment.    Changes this shift: Pt more confused and obtunded overnight. Pt has poor focus during assessments, and speaks in rambling, illogical statements. PRN lactulose enemas given. Pt had poor swallowing ability overnight, coughing while taking ice chips and crushed meds. Pt had one episode of emesis, throwing up his Rifaximin. Cough and deep breathe encouraged, secretions suctioned often. PO midodrine held.    Pt initially planned to receive 3 units of FFP. Pt's lung sounds noted to be coarse before transfusion, and monitored closely during first unit. Pt's lung sounds became more coarse, and pt had more secretions after first unit. Oxygen needs also up, from 2L to 5L per oxymask. MD notified. Chest x-ray and INR obtained. INR 2.22 after first unit of FFP. Second unit transfused with close respiratory monitoring.    HR tachycardic throughout night. BP maintained without pressor support. Afebrile. Pt anuric. Good stool output with rectal tube. Skin unchanged.    Plan: Possible IR procedure pending INR.

## 2021-06-30 NOTE — PROGRESS NOTES
Antimicrobial Stewardship Team Note    Antimicrobial Stewardship Program - A joint venture between [facility] and  Physicians to optimize antibiotic management.  NOT a formal consult - Restricted Antimicrobial Review     Patient: Dax Villareal  MRN: [id number]  Allergies: Patient has no known allergies.    Brief Summary: Dax Villareal is a 30-year old male with PMH significant for alcohol use disorder transferred from OSH on 6/28 with for further management of acute necrotizing pancreatitis.     HPI: Patient was transferred from [facility] in [place] where he was admitted from 5/18 - 6/28 for acute alcohol hepatitis/end stage liver disease complicated by hepatic encephalopathy, acute renal failure requiring hemodialysis, acute hypoxemic respiratory failure and septic shock secondary to acute necrotizing pancreatitis. It was noted that the patient had been actively drinking up until being admitted to [facility].  CT abdomen/pelvis (6/19) from OSH showed possible necrotizing pancreatitis and repeat CT (6/25) showed peripancreatic fluid collection consistent with complications from pancreatitis, and 9cm soft tissue or hemorrhagic structure near pancreatic head concerning for possible internal hemorrhage of pseudocyst. Patient was treated with multiple antibiotics (cefepime, Flagyl, micafungin, ciprofloxacin). He was transferred to [facility] for evaluation by interventional GI for possible necrosectomy.   Upon transfer to [facility] ICU on 6/28, patient on norepinephrine drip to maintain MAP > 65, etiology of shock noted to be likely secondary to sepsis given profound leukocytosis (WBC 50.6) and elevated procalcitonin (1.65). Source of infection was thought to be intra-abdominal due to necrotizing pancreatitis/pancreatic pseudocyst. Low suspicion for pneumonia, UTI, or SSTI. Repeat 6/28 CT of abdomen/pelvis w/ contrast, showed necrotizing pancreatitis w/ peripancreatic necrotic, multilobulated  heterogenous fluid collection with enhancing wall (21.1k24s02.7 cm), large volume abdominal ascites, & mesenteric edema.  Patient was initiated on meropenem, vancomycin, and micafungin. Interventional radiology and endoscopy/pancreaticobiliary consulted on 6/30, determined that urgent drainage of fluid needed. Patient is undergoing drainage of L retroperitoneal fluid collection and catheter placement on 6/30 AM.  Today WBC 48, temp 98.2, tachycardic (121), MRSA nares negative, and blood cultures no growth after 1 day.     Previous antibiotic courses:     Cefepime 6/19-6/28    Metronidazole 6/12-6/28    Micafungin 6/24-    Ciprolfoxacin 6/9-6/19         Active Anti-infective Medications   (From admission, onward)                 Start     Stop    06/29/21 0230  meropenem  500 mg,   Intravenous,   EVERY 24 HOURS     Sepsis        --    06/29/21 0230  micafungin (MYCAMINE) injection  100 mg,   Intravenous,   100 mL/hr,   EVERY 24 HOURS     septic shock        --    06/29/21 0214  Vancomycin Place Maldonado - Receiving Intermittent Dosing  1 each,   Intravenous,   SEE ADMIN INSTRUCTIONS     Sepsis        --    06/28/21 2300  rifaximin  550 mg,   Oral,   2 TIMES DAILY     Intestinal bacteria reduction in the treament of Hepatic Encephalopathy.        --                  Assessment:  Infected necrotizing pancreatitis w/ Pseudocyst s/p drain placement 6/30/2021.  Imaging and infectious markers support infectious process secondary to abdominal source, due to necrotizing pancreatitis & pseudocyst/fluid collection. Patient has not required pressor support for > 24 hours. Acknowledge leukocytosis remains persistent. Believe leukocytosis is not indicative of antibiotic failure as has been broad spectrum therapy, but rather more reflective of suboptimal source control. Unfortunately, no recent blood cultures were collected at OSH (was able to confirm last blood cultures obtained at [facility] were on 6/17/2021 resulted as no  growth). Anticipate patient requiring a prolonged antibiotic course as source control is optimized. He does not have a history of ESBL-producing organisms for which carbapenems should be reserved. Ultimately, want to reserve meropenem for when it is truly indicated, and lessen the risk for carbapenemase-producing organisms. Recommend transitioning from meropenem to Zosyn. With negative MRSA nares and MRSA not being a known GI colonizer, recommend discontinuing vancomycin today. Reasonable to continue micafungin for potential fungal infection, due to patient's critically ill status, however de-escalation to an agent such as fluconazole could be considered pending abscess cultures.  Due to the difficulty of achieving adequate source control in infected pancreatic & peripancreatic infections, a longer duration of antibiotic therapy may be needed.    Recommendation/Interventions  1). Discontinue vancomycin   2). Discontinue meropenem and transition to Zosyn 2.25 grams IV Q6H (renally dose adjusted for iHD)  3). Continue micafungin while abscess cultures pending    Pharmacy took the following actions: Completed note & paged team.      Discussed with ID Staff - Dr. Link Smallwood MD and Dr. Uzma Cagle, PharmD, BCIDP  Luisa Casas, PharmD candidate ([phone])        Vital Signs/Clinical Features:  Vitals         06/28 0700  -  06/29 0659 06/29 0700  -  06/30 0659 06/30 0700  -  06/30 1322   Most Recent    Temp ( F) 98 -  98.7    97.7 -  98.7    98.2 -  99.1     99.1 (37.3)    Pulse 98 -  118    112 -  125    115 -  125     125    Resp 9 -  40    14 -  37    16 -  33     30    BP 67/28 -  114/68    97/65 -  121/75    95/52 -  116/75     104/62    SpO2 (%) 90 -  97    90 -  100    88 -  99     93            Labs  Estimated Creatinine Clearance: 35 mL/min (A) (based on SCr of 3.02 mg/dL (H)).  Recent Labs   Lab Test 12/09/20  1504 01/11/21  1153 05/18/21  1220 06/28/21  1839 06/29/21  0410 06/30/21  0445   CR 0.65* 0.70  0.82 1.78* 2.25* 3.02*       Recent Labs   Lab Test 10/06/20  0927 12/09/20  1504 05/18/21  1220 06/28/21  1839 06/29/21  0410 06/30/21  0445   WBC 6.9 4.2 13.2* 50.6* 49.8* 48.0*   ANEU 5.2 2.1 12.0* 44.0*  --   --    ALYM 0.8 1.3 0.6* 0.0*  --   --    MEDHAT 0.8 0.6 0.4 2.0*  --   --    AEOS 0.1 0.2 0.0 0.5  --   --    HGB 18.8* 17.2 16.6 7.6* 7.4* 7.7*   HCT 51.2 47.7 44.8 23.4* 22.8* 23.4*   MCV 89 93 98 101* 100 100    299 222 127* 143* 150       Recent Labs   Lab Test 12/09/20  1504 01/11/21  1153 05/18/21  1220 06/28/21 1839 06/29/21  0410 06/30/21  0445   BILITOTAL 0.6 0.7 8.2* 21.3* 19.5* 21.1*   ALKPHOS 85 87 309* 90 88 105   ALBUMIN 3.7 3.7 2.3* 3.0* 2.7* 2.8*   * 243* 237* 74* 70* 55*   * 221* 95* 23 23 24       Recent Labs   Lab Test 10/06/20  0903 10/06/20  0927 12/09/20  1504 12/09/20  1837 05/18/21  1220 06/28/21  1839   PCAL  --   --   --   --  1.65 5.56*   LACT 1.5  --  2.4* 1.9 7.7* 1.9   CRP  --  16.9*  --   --   --   --        Recent Labs   Lab Test 06/30/21  0700   VANCOMYCIN 31.2*       Culture Results:  7-Day Micro Results       Procedure Component Value Units Date/Time    Fluid Culture Aerobic Bacterial [] Collected: 06/30/21 0850    Order Status: Completed Lab Status: In process Updated: 06/30/21 1051    Specimen: Body fluid, unsp from Abscess     Gram stain [] Collected: 06/30/21 0850    Order Status: Completed Lab Status: Final result Updated: 06/30/21 1149    Specimen: Peritoneal cavity      Specimen Description Peritoneal cavity Abscess     Gram Stain No organisms seen      No WBC's seen    Anaerobic bacterial culture [] Collected: 06/30/21 0850    Order Status: Completed Lab Status: Preliminary result Updated: 06/30/21 1051    Specimen: Peritoneal cavity from Abscess      Specimen Description Peritoneal cavity Abscess     Special Requests Received in anaerobic tubes.     Culture Micro PENDING    Methicillin Resistant Staph Aureus PCR [[id number]]  Collected: 06/29/21 0559    Order Status: Completed Lab Status: Final result Updated: 06/29/21 0949    Specimen: Nares      Specimen Description Nares     Methicillin Resist/Sens S. aureus PCR Negative     Comment: MRSA Negative: SA Negative  MRSA and Staphylococcus aureus target DNA not   detected, presumed negative for MRSA and SA colonization or the number of   bacteria present may be below the limit of detection for the assay. FDA   approved assay performed using [employer] GeneXpert(R) real-time PCR.         Blood culture [[id number]] Collected: 06/29/21 0402    Order Status: Completed Lab Status: Preliminary result Updated: 06/30/21 0650    Specimen: Blood      Specimen Description Blood Left Hand     Culture Micro No growth after 1 day    Blood culture [[id number]] Collected: 06/29/21 0354    Order Status: Completed Lab Status: Preliminary result Updated: 06/30/21 0650    Specimen: Blood      Specimen Description Blood Right Arm     Culture Micro No growth after 1 day    Blood culture     Order Status: Canceled Lab Status: No result     Specimen: Blood     Blood culture     Order Status: Canceled Lab Status: No result     Specimen: Blood     Blood culture     Order Status: Canceled Lab Status: No result     Specimen: Blood     Blood culture     Order Status: Canceled Lab Status: No result     Specimen: Blood             Recent Labs   Lab Test 10/06/20  1214 12/09/20  1817   URINEPH 7.0 7.0   NITRITE Negative Negative   LEUKEST Negative Negative   WBCU 1 0                         Imaging: Ct Retroperitoneal Abscess Drainage    Result Date: 6/30/2021  Procedures 06/30/2021: 1. CT-guided abscess drain placement History: left RP abscess drainage and placement of catheter in setting of necrotizing pancreatitis Comparison: CT abdomen pelvis 6/29/2021. Staff: Cecil Mauricio MD Fellow/Resident: JUAN MIGUEL Mcarthur M.D. Monitoring: Patient was placed on continuous monitoring with intravenous conscious sedation administered by the IR nursing  staff and supervised by the IR attending. Patient remained stable throughout the procedure. Medications: 1. Versed IV: 1 mg 2. Fentanyl IV: 50 mcg 3. 6 cc 1% lidocaine Sedation time, face-to-face: 18 minutes DLP: 1597 mGy*cm Findings/procedure: Prior to the procedure, both verbal and written informed consent obtained from the patient. Patient placed prone on the CT table. Preprocedure scan obtained revealing large retroperitoneal fluid collection. Adequate percutaneous approach for drain placement. The back prepped and draped. Timeout performed. 1% Lidocaine used for local analgesia. Under intermittent CT fluoroscopic guidance, a 5 [place] [employer] centesis needle catheter was advanced into the fluid collection. CT image documenting needle catheter position within the abscess was saved in the patient's record. Needle removed. Over a guidewire, catheter was removed and tract dilated to 24 Guatemalan. 24 [place]alan Thal-Quick catheter advanced over the wire into the collection. Guidewire removed. Dark bloody fluid was aspirated and sent for labs. CT Fluoroscopy demonstrated adequate placement of the drainage catheter with the fluid collection. Representative CT images were saved in the patient's record. Catheter secured to the skin with a 2-0 Ethilon retention suture. Dressing applied and catheter connected to bag drainage. Estimate blood loss: Minimal Specimens: Dark bloody fluid was aspirated and sent for labs.     Impression: Uncomplicated CT fluoroscopic guided left retroperitoneal 24 Fr Thal quick drain placement. Plan: Catheter to gravity drainage. Flush catheter 30 cc normal saline every shift to maintain patency. Chart daily outputs. Contact IR when net output less than 10 cc in 24 hrs.     Xr Chest Port 1 View    Result Date: 6/30/2021  XR CHEST PORT 1 VIEW  6/30/2021 4:35 AM  HISTORY: eval for worsening hypoxemia COMPARISON: 6/29/2021 FINDINGS: Single AP view of the chest. Tunneled right IJ central line tip  overlying the high right atrium. Left upper extremity PICC tip overlying the low SVC. Trachea is midline. Cardiac silhouette is similar in size. No pneumothorax. Decreased probable trace bilateral pleural effusions. Large unchanged mild diffuse interstitial and airspace opacities. Mildly increased left basilar streaky opacities. Nonspecific focal subglottic tracheal narrowing.     IMPRESSION: 1. Stable support devices. 2. Cardiomegaly with largely unchanged mild diffuse interstitial and airspace opacities. 3. Decreased probable trace bilateral pleural effusions with mildly increased left basilar atelectasis. 4. Nonspecific subglottic focal tracheal narrowing. I have personally reviewed the examination and initial interpretation and I agree with the findings. MIGUEL WALKER MD    Xr Chest Port 1 View    Result Date: 6/29/2021  XR CHEST PORT 1 VIEW  6/29/2021 6:01 AM  HISTORY: Eval for opacities/consolidation COMPARISON: 5/18/2021 FINDINGS: Single AP view of the chest. Right tunneled IJ central line tip overlying the superior cavoatrial junction. Left upper extremity PICC tip overlying the low SVC. Trachea is midline. Cardiac silhouette is enlarged. No pneumothorax. Small left and probable trace right pleural effusions. Mild diffuse interstitial and airspace opacities.     IMPRESSION: 1. Tunneled right IJ central line tip overlying the superior cavoatrial junction. 2. Cardiomegaly with mild diffuse interstitial and airspace opacities, favor pulmonary edema and atelectasis. Superimposed infection is not excluded. 3. Small left and probable trace right pleural effusions. I have personally reviewed the examination and initial interpretation and I agree with the findings. LESLYE SCHMITT MD    Ct Abdomen Pelvis W Contrast    Result Date: 6/29/2021  EXAMINATION: CT ABDOMEN PELVIS W CONTRAST, 6/29/2021 11:10 AM TECHNIQUE:  Helical CT images from the lung bases through the symphysis pubis were obtained with IV contrast.  Contrast dose: iopamidol (ISOVUE-370) solution 105 mL COMPARISON: 6/25/2021 HISTORY: Pancreatitis, necrotizing FINDINGS: Abdomen and pelvis: Sequela of necrotizing pancreatitis with large multilobulated heterogenous fluid collection containing fat and debris located in the central abdomen with enhancing wall measuring approximately 21.1 x 12.0 x 16.7 cm. There is some enhancement of portions of residual pancreas. Large volume ascites. Liver measures up to 26.4 cm in craniocaudal dimension with heterogenous appearance of the parenchyma, mild surface nodularity and recanalized paraumbilical vein suggestive of cirrhosis. Gallbladder is unremarkable. No biliary dilation. Spleen measures up to 17 cm. Adrenal glands are unremarkable. Enlarged kidneys with symmetric hypoenhancement and lack of corticomedullary differentiation. Urinary bladder is decompressed. Rectal tube is present. No dilated bowel. Diffuse small bowel wall thickening with stratification and hyperenhancement. Splenic vein and portal veins are patent. Celiac trunk and SMA are patent. Multiple enlarged mesenteric lymph nodes in the central abdomen. Diffuse hazy mesenteric stranding. Lung bases: Small right and trace left pleural effusions. Associated passive atelectasis in the lung bases. Bibasilar intralobular septal thickening. Bones and soft tissues: Diffuse anasarca. No acute osseous lesion.     IMPRESSION: 1.  Sequela of necrotizing pancreatitis with peripancreatic necrotic fluid collection, large volume abdominal ascites and mesenteric edema. 2.  Thickened, edematous small bowel wall is likely due to reactive enteritis secondary to pancreatitis. 3.  Hepatomegaly with steatosis and mild surface nodularity, associated with splenomegaly and recanalized paraumbilical vein, suggestive of cirrhosis with portal hypertension. 4.  Enlarged hypoenhancing kidneys with loss of corticomedullary differentiation, suggestive of acute renal parenchymal disease. No  hydronephrosis or perinephric fat stranding. 5.  Small right and trace left pleural effusions, interlobular septal thickening, and diffuse anasarca, likely related to fluid third spacing. I have personally reviewed the examination and initial interpretation and I agree with the findings. LISA LYNCH MD

## 2021-06-30 NOTE — PLAN OF CARE
Major shift events: A/Oriented to self only. Follows command. Lift pt to chair this evening. -120's. On 5L oxymask, did require up to 8L O2 oxymask during dialysis with increase work of breathing and accessory muscle use. Lung sounds crackle/coarse. -110's; did get hypotensive during dialysis with sbp 90's. Was able to take out 1.5L. Pt anuric. Speech consulted and ok pt to be on full liq diet. NJ placed, TF started this evening @1700 10ml/hr with goal rate 50 and SFWF. Rectal tube with possibly 50ml out this shift. L peritoneal flank drain placed this morning in IR. Putting out dark brown/black soft/watery output, MICU attending was at bedside and was made aware of high output and output color. Irrigated drain twice per order with 10ml NS. Sister called and updated on Pt.     Plan: Continue POC, notify MD for any changes or concern.

## 2021-06-30 NOTE — PROGRESS NOTES
Patient Name: Dax Villareal  Medical Record Number: [id number]  Today's Date: 6/30/2021    Procedure: Image Guided Left Retroperitoneal Drain Placement  Proceduralist: Dr. Lakesha Mcarthur & Dr. Cecil Mauricio    Procedure Start: 0828  Procedure end: 0846  Sedation medications administered: 1 mg Versed And 50 mcg Fentanyl     Report given to: Rosie FISCHER RN  : N/A    Other Notes: Pt arrived to IR room CT2 from . Consent reviewed. Pt denies any questions or concerns regarding procedure. Pt positioned prone and monitored per protocol. Pt tolerated procedure without any noted complications. Pt transferred back to .

## 2021-06-30 NOTE — PROCEDURES
Small Bowel Feeding Tube Placement Assessment  Reason for Feeding Tube Placement: PO lactulose administration, nutrition therapy with planned procedures necessitating NPO  Cortrak Start Time: 1110   Cortrak End Time: 1112  Medicine Delivered During Procedure: lidocaine, right nare  Placement Successful: yes per Jessica (AXR pending)   Procedure Complications: none  Final Placement [place] at exit of nare: 104 cm  Face to Face time with patient: 30 min    Bridle Placement:   Reason for bridle placement: pt with AMS   Medicine delivered during procedure: lidocaine gel   Procedure: Successful  Location of top of clip on FT: @ 105 cm marker   Condition of nose/skin at time of bridle placement: Unremarkable   Face to Face time with patient: 3 minutes.    Frances Zambrano, TERENCE, LD  ([facility] dietitian, 2019 (Mon-Fri))

## 2021-06-30 NOTE — PRE-PROCEDURE
GENERAL PRE-PROCEDURE:   Procedure:  Retroperitoneal drain placement.   Date/Time:  6/30/2021 8:08 AM    Verbal consent obtained?: Yes    Written consent obtained?: Yes    Risks and benefits: Risks, benefits and alternatives were discussed    Consent given by:  Patient  Patient states understanding of procedure being performed: Yes    Patient's understanding of procedure matches consent: Yes    Procedure consent matches procedure scheduled: Yes    Expected level of sedation:  Moderate  Appropriately NPO:  Yes  ASA Class:  Class 2- mild systemic disease, no acute problems, no functional limitations  Mallampati  :  Grade 2- soft palate, base of uvula, tonsillar pillars, and portion of posterior pharyngeal wall visible  Lungs:  Crackles left base, crackles right base and wheezes  Heart:  Normal heart sounds and rate  History & Physical reviewed:  History and physical reviewed and no updates needed  Statement of review:  I have reviewed the lab findings, diagnostic data, medications, and the plan for sedation

## 2021-06-30 NOTE — PROGRESS NOTES
"CLINICAL NUTRITION SERVICES - ASSESSMENT NOTE     Nutrition Prescription    RECOMMENDATIONS FOR MDs/PROVIDERS TO ORDER:  Change lactulose enema to PO.    Monitor lytes, replace PRN if low; pt is at high risk for refeeding syndrome.     Continue 2 gm Na+ diet (with texture restrictions per SLP) for pleasure when pt not needing to be NPO.    Malnutrition Status:    Severe malnutrition in the context of chronic illness.     Recommendations already ordered by Registered Dietitian (RD):  Wrote orders for Novasource Renal (anticipating will need dialysis) @ goal rate of 50 ml/hr to provide 2400 kcal (35 kcal/kg), 109 g pro (1.6 g/kg), 220 g CHO, 860 mL water, and 0 g fiber daily.     Ordered Certavite multivitamin/mineral (15 mL/day via FT) to help ensure micronutrient needs being met with suspected hypermetabolic demands and potential interruptions to TF infusions.    Future/Additional Recommendations:  Change Certavite to NephroNex if starts dialysis, if necessary.       REASON FOR ASSESSMENT  Dax Villareal is 30 year old male assessed by the dietitian for Provider Order - Registered Dietitian to Assess and Order TF per Medical Nutrition Therapy Protocol    NUTRITION HISTORY  Pt with AMS, did not fully interview. Pt is able to say hello and introduce self, but otherwise was not speaking in coherent sentences. Was able to say \"ow\" and \"stop\" during FT placement but otherwise not very clear.     Pt was on an oral diet (2 gm Na+) and some TF (Nepro @ 20 mL/hr is last charted) but has a h/o pulling out nasoenteral feeding tubes (has pulled out at least 2 per OSH documentation). Per OSH chart, pt started drinking EtOH at age 8. Was drinking 1-1.75 L of vodka daily, which puts him at chronic risk for deficiencies in both macro and micronutrients.     Pt required HD at OSH. Initial hospitalization to [facility] was on 5/19/21. Pt has likely had variable nutritional intakes for the past 1-2 months at minimum.     CURRENT " "NUTRITION ORDERS  Diet: NPO  Intake/Tolerance: pt tolerated some full liquids yesterday but is NPO again today for procedures. He is more confused.     LABS  Labs reviewed- hypokalemia (3.1), phos is elevated at 5.3 today.  Urinary ketones not checked d/t no UOP    MEDICATIONS  Medications reviewed- lactulose enema, thiamin, folate    ANTHROPOMETRICS  Height: 167.6 cm (5' 6\")  Most Recent Weight: 80.1 kg (176 lb 9.4 oz)    IBW: 64.5 kg  BMI: Overweight BMI 25-29.9  Weight History:   Wt Readings from Last 10 Encounters:   06/29/21 80.1 kg (176 lb 9.4 oz)   05/18/21 84.5 kg (186 lb 4.8 oz)   02/09/21 86.2 kg (190 lb)   01/11/21 85.7 kg (189 lb)     Weight is down 5.6 kg over the past 6 months, a decline of 6.5%.    Dosing Weight: 68 kg (adj wt)    ASSESSED NUTRITION NEEDS  Estimated Energy Needs: 3656-7365 kcals/day (25 - 30+ kcals/kg)  Justification: hypermetabolism with pancreatitis  Estimated Protein Needs: 100-135 g/day (1.5 - 2 g/kg)  Justification: pancreatitis and Hypercatabolism with critical illness; likely will need dialysis restart  Estimated Fluid Needs: Per provider pending fluid status    PHYSICAL FINDINGS  See malnutrition section below.  Jaundice  Cachexia  Ascites  Poor dentition     MALNUTRITION  % Intake: </=75% for >/= 1 month   % Weight Loss: Up to 10% in 6 months  Subcutaneous Fat Loss: None observed  Muscle Loss: Scapular bone: moderate, Thoracic region (clavicle, acromium bone, deltoid, trapezius, pectoral): moderate, Upper arm (bicep, tricep): moderate, Dorsal hand: mild, Upper leg (quadricep, hamstring): moderate and Posterior calf: moderate  Fluid Accumulation/Edema: Does not meet criteria (+ abd ascites)  Malnutrition Diagnosis: Severe malnutrition in the context of chronic illness.     NUTRITION DIAGNOSIS  Inadequate protein-energy intake related to anorexia, EtOH abuse, AMS inhibiting ability to adequately take PO as evidenced by pt with 6.5% weight loss over the past 6 months, NPO " currently, necrotizing pancreatitis found, and pt with muscle wasting c/w severe malnutrition.      INTERVENTIONS  Implementation  Nutrition Education: See education flowsheet   Collaboration with other providers  Enteral Nutrition - Initiate  Multivitamin/mineral supplement therapy   FT placement    Goals  Total avg nutritional intake to meet a minimum of 25 kcal/kg and 1.5 g PRO/kg daily (per dosing wt 68 kg).     Monitoring/Evaluation  Progress toward goals will be monitored and evaluated per protocol.    Frances Zambrano RD, LD  ([facility] dietitian, 0139 (Mon-Fri))

## 2021-06-30 NOTE — PROGRESS NOTES
MICU Progress Note  Dax Villareal MRN: [id number]  Age: 30 year old, : 1990  06/30/21        ASSESSMENT & PLAN     Dax Villareal is a 30-year-old male with h/o alcohol use disorder and a recent prolonged hospital stay at [facility] in [place] for acute alcoholic hepatitis/end stage liver disease c/b hepatic encephalopathy, acute renal failure leading to hemodialysis, acute hypoxemic respiratory failure, and septic shock 2/2 acute necrotizing pancreatitis. He was transferred to the MICU here on 2021 to manage his necrotizing pancreatitis by the interventional GI team with possible necrosectomy.     Changes today:  - Retroperitoneal fluid drainage and catheter placement done by IR today     > 2 units FFP given overnight with increasing oxygen requirements up to 5L. INR decreased from 3.56 to 2.13     > Awaiting fluid cultures results  - Hemodialysis started today, per nephrology recs  - Nutrition consulted for post-pyloric feeding tube placement  - Dilaudid 0.2mg Q4H PRN started  - Scheduled miralax BID and [place] lactulose protocol  - IV vitamin K given  - monitor VBGs     ===NEURO===  # Hepatic Encephalopathy +/- ICU Delirium, worsening  Was intermittently altered during time at [facility] 2/2 hepatic encephalopathy with possible component of ICU delirium. Also possible contributing factor include underlying septic shock. On admission to [facility] ICU , he was A&Ox3, following commands and answering questions appropriately.  he was more confused and not answering most questions appropriately.  - [facility] protocol  - Miralax BID  - C/w delirium precautions  - C/w melatonin 6mg at bedtime  - Avoid Benzos and Opioids as able     # Alcohol Use Disorder  # Recent History of Alcohol Withdrawal  Appears to have been actively drinking up until [facility] admission. Had alcohol withdrawal early on during that hospitalization, which has now resolved. No concern for ongoing withdrawal.  - C/w folic acid  and thiamine    ===CARDIOVASCULAR===  # Vasopressor Dependent Shock 2/2 Sepsis, Now off of Vasopressors  Patient was on norepinephrine drip to maintain MAP > 65 at [facility], which was continued upon admission to [facility] ICU. Was also on 20mg Midodrine TID at that hospital. Etiology of shock most likely 2/2 sepsis given profoundly elevated WBC and procalcitonin in the setting of necrotizing pancreatitis/pancreatic pseudocyst. No active signs of GI bleeding to suggest hemorrhage as a cause. Low suspicion for cardiogenic shock (normal echo findings at OSH on 6/10/2021, EF > 70% without wall motion abnormalities). Was weaned off of Norepinephrine on 6/29 by 0800.  - C/w antimicrobials as below  - C/w midodrine 20 mg TID  - MAP Goal > 65     ===RESPIRATORY===  # Acute hypoxic respiratory failure due to hypervolemia  Oxygen requirements increased from 2 to 5L after FFP infusions, likely due to volume. No signs of anaphylaxis and no high concern for transfusion reaction. Treating with broad antibiotics as below if this is a pneumonia, though more likely this issue is secondary to hypervolemia (renal failure and due for dialysis). Currently protecting his airway, but if mental status were to worsen he might need intubation due to HE.  - Hemodialysis starting today  - repeat VBG at midnight  - C/w aspiration precautions  - C/w supplemental oxygen to keep sats above 89%     ===GASTROINTESTINAL/NUTRITION===  # Decompensated cirrhosis 2/2 EtOH abuse c/b Hepatic Encephalopathy and recurrent ascites  # Necrotizing Pancreatitis w/ Pseudocyst  Patient was initially admitted over a month ago at [facility] for alcoholic hepatitis. He received steroids early in the course for 1 week with initial improvement. CT A/P (6/19) showed possible necrotizing pancreatitis. Repeat CT on 6/25 showed peripancreatic fluid collection consistent with complications from pancreatitis as well as a 9 cm soft tissue or hemorrhagic structure near pancreatic  head concerning for possible internal hemorrhage of pseudocyst. Patient was treated with antibiotics (Cefepime, Metronidazole Ciprofloxacin, and Micafungin). Transferred to [facility] for evaluation by interventional GI for possible necrosectomy. It was felt to not be easily accessible via the GI tract, so IR placed RP drain on 6/30.  - GI consulted, recs appreciated     > has NJT for feeds; okay for full liquid diet     > Trend MELD labs     > Consider diagnostic paracentesis (check for albumin, protein, lipase/amylase, cell count/cultures) -- hold off for now given complex abdominal anatomy and recent RP drain placement     > C/w antibiotics and lactulose (titrate to 3 soft stools per day, no more than 1L)  - IR consulted, recs appreciated     > Drainage of left RP fluid collection and catheter placement 6/30     > Awaiting fluid culture results, gram stain was negative  - Started dilaudid 0.2 mg Q4H PRN for post-IR drainage pain  - bowel meds as above   - C/w lactulose + rifaximin  - Vitamin K challenge (3 days, ending 7/1)  - C/w Midodrine 20mg TID  - C/w pantoprazole 40 mg IV qday    MELD-Na score: 37 at 6/30/2021  7:00 AM  MELD score: 37 at 6/30/2021  7:00 AM  Calculated from:  Serum Creatinine: 3.02 mg/dL at 6/30/2021  4:45 AM  Serum Sodium: 138 mmol/L (Rounded to 137 mmol/L) at 6/30/2021  4:45 AM  Total Bilirubin: 21.1 mg/dL at 6/30/2021  4:45 AM  INR(ratio): 2.13 at 6/30/2021  7:00 AM  Age: 30 years 8 months    # Protein Calorie Malnutrition  SLP consulted 6/29 because of patient being altered and dysphagic. They did not see him as he would be NPO for IR procedure 6/30. GI on 6/29 recommended future NJ tube if unable to tolerate PO intake. Based on these recommendations and his necrotizing pancreatitis and small PO intake, a post-pyloric feeding tube order was placed.  - Nutrition, SLP following  - full liquid diet  - NJ in for post-pyloric tube feeds    ===RENAL / FLUIDS / ELECTROLYTES===  # RADHA vs.  ESRD  Baseline Cr in May 2021 was 0.82. At [facility], patient had anuric RADHA thought to be secondary to ATN from antibiotics, contrast nephropathy, pancreatitis, and/or septic shock in the setting of underlying liver disease +/- hepatorenal syndrome. Patient was started on HD while at [facility]. Unclear total number of HD runs.  - Nephrology consulted, recs appreciated     > Hemodialysis starting 6/30, consent obtained from sisterNoni     > Will correct Ca on hemodialysis     > Daily chemistry labs, strict I/O, and daily weights     ===INFECTIOUS DISEASE===     # Septic shock due to necrotizing pancreatitis w/ pseudocyst  Patient was treated with antibiotics (including Cefepime, Metronidazole, Ciprofloxacin and Micafungin) at OSH. Etiology of shock most likely 2/2 sepsis given profound leukocytosis to 50.6 on admission with elevated procalcitonin. Source of infection most likely intra-abdominal from necrotizing pancreatitis/pancreatic pseudocyst (seen on CT from OSH) vs. SBP. Low suspicion for PNA, UTI or soft tissue/infections.  - C/w broad empiric abx coverage with IV Meropenem, Vancomycin and Micafungin  - Consider sputum culture if not improving  - F/u w/ blood cultures     Antimicrobials/Antivirals:  - Meropenem (6/29 - *)  - Vancomycin (6/29 - *)  - Micafungin (6/29 - *)      Micro:  Blood cultures pending, no growth after 24 hours     ===HEMATOLOGY===  # Profound Leukocytosis  Etiology most likely secondary to profound sepsis.  - Trend with daily CBC  - Management of Septic Shock as above     # Anemia  # Thrombocytopenia  # Coagulopathy  Etiology most likely secondary to underlying liver disease.  - 2 units FFP given with a decrease in INR from 3.56 yesterday to 2.13 this morning  - Continue to monitor for signs of bleeding  - Transfuse if hemoglobin < 7, platelet < 10 or < 30 with bleeding     ===ENDOCRINE===  No active issues     ===SKIN/MSK===  # Critical Illness Myopathy  # Deconditioning  - PT/OT  "consulted 6/28, they are following     # Buttock Wound  - WOC Consulted 6/29    FEN: TFs by NJ; full liquid diet  Prophylaxis:  DVT: SCDs, high bleeding risk GI: On PPI  Family: Updated sister over the phone  Disposition: Medical ICU  Code Status: Full code     Patient was discussed with staff attending, Tin Freeman MD.    Marcia Brumfield, MS4    Resident/Fellow Attestation   I, Fernando Guzman, was present with the medical/NAHUM student who participated in the service and in the documentation of the note.  I have verified the history and personally performed the physical exam and medical decision making.  I agree with the assessment and plan of care as documented in the note.      Fernando Guzman MD  PGY2  Date of Service (when I saw the patient): 06/30/21       Interval Events   Nursing notes reviewed. Was more confused overnight. He was able to have a few bites of yogurt and other food without much difficulty. Had increased secretions later with one episode of emesis. 1 unit of FFP given with O2 requirements increasing from 2 to 5L. Course breath sounds were hear bilaterally and a CXR was done suggesting no big changes to fluid in the lungs. INR decreased from 3.56 to 2.22. 2nd FFP unit given and patient required 5L O2 still. Recheck of INR was 2.13 and IR said they would take him for the drainage and catheter placement. Hemodialysis will also be started today.       Objective   VITAL SIGNS:  /51   Pulse 122   Temp 99.1  F (37.3  C) (Oral)   Resp 20   Ht 1.676 m (5' 6\")   Wt 78.8 kg (173 lb 11.6 oz)   SpO2 (!) 88%   BMI 28.04 kg/m      Intake/Output Summary (Last 24 hours) at 6/30/2021 0751  Last data filed at 6/30/2021 0657  Gross per 24 hour   Intake 1443.75 ml   Output 1150 ml   Net 293.75 ml     Wt:   Wt Readings from Last 2 Encounters:   06/30/21 78.8 kg (173 lb 11.6 oz)   05/18/21 84.5 kg (186 lb 4.8 oz)        Vent settins:   Resp: 20    BP - Mean:  [65-87] 65    Gen: NAD, " appears tired and confused  HEENT: scleral icterus, EOMI, head atraumatic  Cardio: tachycardic, normal s1,s2, no rubs, murmurs, or gallops  Pulm: course breathe sounds bilaterally, no wheezing, tachypnic, oxymask in place at 5L  Abd: soft and diffusely tender, distended, has a rectal tube in  Ext: 1+ BLE edema  Skin: jaundice  Neuro: speech slightly slurred and does not answer questions appropriately, motor grossly nonfocal    DIAGNOSTIC STUDIES:   BMP  Recent Labs   Lab Test 06/30/21  0445 06/29/21  0410 06/28/21  1839 05/18/21  1220 12/09/20  1837 12/09/20 1837    138 134 127*   < >  --    POTASSIUM 3.1* 3.3* 3.4 2.0*   < > 3.3*   CHLORIDE 104 103 101 70*   < >  --    CO2 22 25 27 42*   < >  --    ANIONGAP 14 9 6 15*   < >  --    LACT  --   --  1.9 7.7*  --  1.9   BUN 34* 20 14 10   < >  --    CR 3.02* 2.25* 1.78* 0.82   < >  --    * 112* 119* 89   < >  --    JANENE 8.4* 7.9* 8.2* 13.4*   < >  --    MAG 2.4* 2.0  --  1.6   < >  --    PHOS 5.3* 2.3*  --   --   --   --     < > = values in this interval not displayed.     Heme   Recent Labs   Lab Test 06/30/21  0700 06/30/21  0445 06/29/21  0410 06/28/21  1839 05/18/21  1220 05/18/21  1220   WBC  --  48.0* 49.8* 50.6*  --  13.2*   ANEU  --   --   --  44.0*  --  12.0*   ALYM  --   --   --  0.0*  --  0.6*   AEOS  --   --   --  0.5  --  0.0   HGB  --  7.7* 7.4* 7.6*  --  16.6   MCV  --  100 100 101*  --  98   PLT  --  150 143* 127*  --  222   INR 2.13* 2.22* 3.54* 3.76*   < >  --     < > = values in this interval not displayed.     Hepatic   Recent Labs   Lab 06/30/21  0445 06/29/21  0410 06/28/21  1839   ALKPHOS 105 88 90   AST 55* 70* 74*   ALT 24 23 23   BILITOTAL 21.1* 19.5* 21.3*   PROTTOTAL 7.0 6.4* 6.8   ALBUMIN 2.8* 2.7* 3.0*      Iron No lab results found.  ABG/VBG   Recent Labs   Lab Test 06/28/21  1839   O2PER 2L   PHV 7.44*   PCO2V 39*       Urine Studies:   Recent Labs   Lab Test 12/09/20  1817 10/06/20  1214   SG 1.010 1.021   URINEPH 7.0 7.0    NITRITE Negative Negative   LEUKEST Negative Negative   WBCU 0 1   RBCU 0 1   PROTEIN 10* 10*       Microbiology:  No results found for: RS, FLUAG    Recent Labs   Lab Test 06/30/21  0850 06/29/21  0559 06/29/21  0402 06/29/21  0354 05/18/21  1230 05/18/21  1219   CULT PENDING  --  No growth after 1 day No growth after 1 day No growth after 6 days No growth after 6 days   SDES Peritoneal cavity Abscess  Peritoneal cavity Abscess Nares Blood Left Hand Blood Right Arm Blood Blood       Imaging:  Recent Results (from the past 24 hour(s))   XR Chest Port 1 View    Narrative    XR CHEST PORT 1 VIEW  6/30/2021 4:35 AM      HISTORY: eval for worsening hypoxemia    COMPARISON: 6/29/2021    FINDINGS:   Single AP view of the chest. Tunneled right IJ central line tip  overlying the high right atrium. Left upper extremity PICC tip  overlying the low SVC. Trachea is midline. Cardiac silhouette is  similar in size. No pneumothorax. Decreased probable trace bilateral  pleural effusions. Large unchanged mild diffuse interstitial and  airspace opacities. Mildly increased left basilar streaky opacities.  Nonspecific focal subglottic tracheal narrowing.      Impression    IMPRESSION:   1. Stable support devices.  2. Cardiomegaly with largely unchanged mild diffuse interstitial and  airspace opacities.  3. Decreased probable trace bilateral pleural effusions with mildly  increased left basilar atelectasis.  4. Nonspecific subglottic focal tracheal narrowing.    I have personally reviewed the examination and initial interpretation  and I agree with the findings.    MIGUEL WALKER MD   CT Retroperitoneal Abscess Drainage    Narrative    Procedures 06/30/2021:  1. CT-guided abscess drain placement    History: left RP abscess drainage and placement of catheter in setting  of necrotizing pancreatitis    Comparison: CT abdomen pelvis 6/29/2021.    Staff: Cecil Mauricio MD    Fellow/Resident: JUAN MIGUEL Mcarthur M.D.    Monitoring: Patient was  placed on continuous monitoring with  intravenous conscious sedation administered by the IR nursing staff  and supervised by the IR attending. Patient remained stable throughout  the procedure.     Medications:  1. Versed IV: 1 mg  2. Fentanyl IV: 50 mcg  3. 6 cc 1% lidocaine    Sedation time, face-to-face: 18 minutes    DLP: 1597 mGy*cm    Findings/procedure:    Prior to the procedure, both verbal and written informed consent  obtained from the patient. Patient placed prone on the CT table.  Preprocedure scan obtained revealing large retroperitoneal fluid  collection. Adequate percutaneous approach for drain placement.     The back prepped and draped. Timeout performed. 1% Lidocaine used for  local analgesia. Under intermittent CT fluoroscopic guidance, a 5  Iraqi ApniCureesis needle catheter was advanced into  the fluid collection. CT image documenting needle catheter position  within the abscess was saved in the patient's record. Needle removed.     Over a guidewire, catheter was removed and tract dilated to 24 Iraqi.  24 Iraqi Thal-Quick catheter advanced over the wire into the  collection. Guidewire removed. Dark bloody fluid was aspirated and  sent for labs.    CT Fluoroscopy demonstrated adequate placement of the drainage  catheter with the fluid collection. Representative CT images were  saved in the patient's record.    Catheter secured to the skin with a 2-0 Ethilon retention suture.  Dressing applied and catheter connected to bag drainage.    Estimate blood loss: Minimal    Specimens: Dark bloody fluid was aspirated and sent for labs.      Impression    Impression:  Uncomplicated CT fluoroscopic guided left retroperitoneal 24 Fr Thal  quick drain placement.     Plan:   Catheter to gravity drainage. Flush catheter 30 cc normal saline every  shift to maintain patency. Chart daily outputs. Contact IR when net  output less than 10 cc in 24 hrs.           Medications     Medications    folic  acid  1 mg Oral Daily    Or     folic acid  1 mg Intravenous Daily     gelatin absorbable  1 each Topical During Hemodialysis (from stock)     lactulose  20 g Oral or NG Tube TID     lidocaine  5 mL Topical Once     melatonin  6 mg Oral At Bedtime     meropenem  500 mg Intravenous Q24H     micafungin  100 mg Intravenous Q24H     midodrine  20 mg Oral Q8H     multivitamins w/minerals  15 mL Per Feeding Tube Daily     pantoprazole (PROTONIX) IV  40 mg Intravenous Daily with breakfast     phytonadione  10 mg Intravenous Daily     polyethylene glycol  17 g Oral BID     rifaximin  550 mg Oral BID     sodium chloride (PF)  10 mL Irrigation Q8H     sodium chloride (PF)  9 mL Intracatheter During Hemodialysis (from stock)     sodium chloride (PF)  9 mL Intracatheter During Hemodialysis (from stock)     vitamin B1  100 mg Oral Daily    Or     thiamine  100 mg Intravenous Daily     vancomycin place smith - receiving intermittent dosing  1 each Intravenous See Admin Instructions       - MEDICATION INSTRUCTIONS -       dextrose

## 2021-07-01 ENCOUNTER — APPOINTMENT (OUTPATIENT)
Dept: OCCUPATIONAL THERAPY | Facility: CLINIC | Age: 31
End: 2021-07-01
Attending: INTERNAL MEDICINE
Payer: MEDICAID

## 2021-07-01 ENCOUNTER — APPOINTMENT (OUTPATIENT)
Dept: PHYSICAL THERAPY | Facility: CLINIC | Age: 31
End: 2021-07-01
Attending: INTERNAL MEDICINE
Payer: MEDICAID

## 2021-07-01 ENCOUNTER — APPOINTMENT (OUTPATIENT)
Dept: SPEECH THERAPY | Facility: CLINIC | Age: 31
End: 2021-07-01
Attending: INTERNAL MEDICINE
Payer: MEDICAID

## 2021-07-01 LAB
ALBUMIN SERPL-MCNC: 2.6 G/DL (ref 3.4–5)
ALP SERPL-CCNC: 123 U/L (ref 40–150)
ALT SERPL W P-5'-P-CCNC: 27 U/L (ref 0–70)
ANION GAP SERPL CALCULATED.3IONS-SCNC: 10 MMOL/L (ref 3–14)
AST SERPL W P-5'-P-CCNC: 70 U/L (ref 0–45)
BASE EXCESS BLDV CALC-SCNC: 1.4 MMOL/L
BILIRUB SERPL-MCNC: 20.7 MG/DL (ref 0.2–1.3)
BUN SERPL-MCNC: 23 MG/DL (ref 7–30)
CALCIUM SERPL-MCNC: 8.8 MG/DL (ref 8.5–10.1)
CHLORIDE SERPL-SCNC: 107 MMOL/L (ref 94–109)
CO2 SERPL-SCNC: 22 MMOL/L (ref 20–32)
CREAT SERPL-MCNC: 2.32 MG/DL (ref 0.66–1.25)
ERYTHROCYTE [DISTWIDTH] IN BLOOD BY AUTOMATED COUNT: ABNORMAL % (ref 10–15)
GFR SERPL CREATININE-BSD FRML MDRD: 36 ML/MIN/{1.73_M2}
GLUCOSE SERPL-MCNC: 106 MG/DL (ref 70–99)
HCO3 BLDV-SCNC: 26 MMOL/L (ref 21–28)
HCT VFR BLD AUTO: 23.1 % (ref 40–53)
HGB BLD-MCNC: 7.4 G/DL (ref 13.3–17.7)
INR PPP: 2.19 (ref 0.86–1.14)
LACTATE BLD-SCNC: 2.1 MMOL/L (ref 0.7–2)
MCH RBC QN AUTO: 32.2 PG (ref 26.5–33)
MCHC RBC AUTO-ENTMCNC: 32 G/DL (ref 31.5–36.5)
MCV RBC AUTO: 100 FL (ref 78–100)
O2/TOTAL GAS SETTING VFR VENT: 60 %
PCO2 BLDV: 37 MM HG (ref 40–50)
PH BLDV: 7.45 PH (ref 7.32–7.43)
PLATELET # BLD AUTO: 153 10E9/L (ref 150–450)
PO2 BLDV: 43 MM HG (ref 25–47)
POTASSIUM SERPL-SCNC: 3.7 MMOL/L (ref 3.4–5.3)
POTASSIUM SERPL-SCNC: 3.7 MMOL/L (ref 3.4–5.3)
PROT SERPL-MCNC: 6.9 G/DL (ref 6.8–8.8)
RBC # BLD AUTO: 2.3 10E12/L (ref 4.4–5.9)
SODIUM SERPL-SCNC: 139 MMOL/L (ref 133–144)
WBC # BLD AUTO: 44.7 10E9/L (ref 4–11)

## 2021-07-01 PROCEDURE — 250N000013 HC RX MED GY IP 250 OP 250 PS 637: Performed by: STUDENT IN AN ORGANIZED HEALTH CARE EDUCATION/TRAINING PROGRAM

## 2021-07-01 PROCEDURE — 250N000011 HC RX IP 250 OP 636: Performed by: NURSE PRACTITIONER

## 2021-07-01 PROCEDURE — 258N000003 HC RX IP 258 OP 636: Performed by: STUDENT IN AN ORGANIZED HEALTH CARE EDUCATION/TRAINING PROGRAM

## 2021-07-01 PROCEDURE — 97110 THERAPEUTIC EXERCISES: CPT | Mod: GO

## 2021-07-01 PROCEDURE — 250N000011 HC RX IP 250 OP 636: Performed by: STUDENT IN AN ORGANIZED HEALTH CARE EDUCATION/TRAINING PROGRAM

## 2021-07-01 PROCEDURE — 80053 COMPREHEN METABOLIC PANEL: CPT | Performed by: STUDENT IN AN ORGANIZED HEALTH CARE EDUCATION/TRAINING PROGRAM

## 2021-07-01 PROCEDURE — 99233 SBSQ HOSP IP/OBS HIGH 50: CPT | Mod: GC | Performed by: INTERNAL MEDICINE

## 2021-07-01 PROCEDURE — 84132 ASSAY OF SERUM POTASSIUM: CPT | Performed by: INTERNAL MEDICINE

## 2021-07-01 PROCEDURE — 250N000009 HC RX 250: Performed by: STUDENT IN AN ORGANIZED HEALTH CARE EDUCATION/TRAINING PROGRAM

## 2021-07-01 PROCEDURE — 36592 COLLECT BLOOD FROM PICC: CPT | Performed by: INTERNAL MEDICINE

## 2021-07-01 PROCEDURE — 85027 COMPLETE CBC AUTOMATED: CPT | Performed by: STUDENT IN AN ORGANIZED HEALTH CARE EDUCATION/TRAINING PROGRAM

## 2021-07-01 PROCEDURE — 99233 SBSQ HOSP IP/OBS HIGH 50: CPT | Performed by: STUDENT IN AN ORGANIZED HEALTH CARE EDUCATION/TRAINING PROGRAM

## 2021-07-01 PROCEDURE — 85610 PROTHROMBIN TIME: CPT | Performed by: STUDENT IN AN ORGANIZED HEALTH CARE EDUCATION/TRAINING PROGRAM

## 2021-07-01 PROCEDURE — 92526 ORAL FUNCTION THERAPY: CPT | Mod: GN

## 2021-07-01 PROCEDURE — 93005 ELECTROCARDIOGRAM TRACING: CPT

## 2021-07-01 PROCEDURE — 97535 SELF CARE MNGMENT TRAINING: CPT | Mod: GO

## 2021-07-01 PROCEDURE — 97530 THERAPEUTIC ACTIVITIES: CPT | Mod: GP

## 2021-07-01 PROCEDURE — 120N000003 HC R&B IMCU UMMC

## 2021-07-01 PROCEDURE — 99233 SBSQ HOSP IP/OBS HIGH 50: CPT | Performed by: PHYSICIAN ASSISTANT

## 2021-07-01 PROCEDURE — 99291 CRITICAL CARE FIRST HOUR: CPT | Mod: GC | Performed by: INTERNAL MEDICINE

## 2021-07-01 PROCEDURE — 93010 ELECTROCARDIOGRAM REPORT: CPT | Performed by: INTERNAL MEDICINE

## 2021-07-01 PROCEDURE — C9113 INJ PANTOPRAZOLE SODIUM, VIA: HCPCS | Performed by: STUDENT IN AN ORGANIZED HEALTH CARE EDUCATION/TRAINING PROGRAM

## 2021-07-01 PROCEDURE — 83605 ASSAY OF LACTIC ACID: CPT | Performed by: INTERNAL MEDICINE

## 2021-07-01 PROCEDURE — 999N000157 HC STATISTIC RCP TIME EA 10 MIN

## 2021-07-01 PROCEDURE — 82803 BLOOD GASES ANY COMBINATION: CPT | Performed by: STUDENT IN AN ORGANIZED HEALTH CARE EDUCATION/TRAINING PROGRAM

## 2021-07-01 RX ORDER — ONDANSETRON 2 MG/ML
4 INJECTION INTRAMUSCULAR; INTRAVENOUS EVERY 6 HOURS PRN
Status: DISCONTINUED | OUTPATIENT
Start: 2021-07-01 | End: 2021-07-28

## 2021-07-01 RX ORDER — PIPERACILLIN SODIUM, TAZOBACTAM SODIUM 2; .25 G/10ML; G/10ML
2.25 INJECTION, POWDER, LYOPHILIZED, FOR SOLUTION INTRAVENOUS EVERY 6 HOURS
Status: CANCELLED | OUTPATIENT
Start: 2021-07-01

## 2021-07-01 RX ORDER — POLYETHYLENE GLYCOL 3350 17 G/17G
17 POWDER, FOR SOLUTION ORAL 2 TIMES DAILY PRN
Status: DISCONTINUED | OUTPATIENT
Start: 2021-07-01 | End: 2021-10-12 | Stop reason: HOSPADM

## 2021-07-01 RX ORDER — MIDODRINE HYDROCHLORIDE 5 MG/1
10 TABLET ORAL DAILY PRN
Status: DISCONTINUED | OUTPATIENT
Start: 2021-07-01 | End: 2021-08-18

## 2021-07-01 RX ADMIN — RIFAXIMIN 550 MG: 550 TABLET ORAL at 20:49

## 2021-07-01 RX ADMIN — SODIUM CHLORIDE 250 ML: 9 INJECTION, SOLUTION INTRAVENOUS at 23:52

## 2021-07-01 RX ADMIN — Medication 15 ML: at 09:21

## 2021-07-01 RX ADMIN — RIFAXIMIN 550 MG: 550 TABLET ORAL at 10:15

## 2021-07-01 RX ADMIN — FOLIC ACID 1 MG: 5 INJECTION, SOLUTION INTRAMUSCULAR; INTRAVENOUS; SUBCUTANEOUS at 09:20

## 2021-07-01 RX ADMIN — MICAFUNGIN SODIUM 100 MG: 50 INJECTION, POWDER, LYOPHILIZED, FOR SOLUTION INTRAVENOUS at 02:14

## 2021-07-01 RX ADMIN — THIAMINE HYDROCHLORIDE 100 MG: 100 INJECTION, SOLUTION INTRAMUSCULAR; INTRAVENOUS at 09:20

## 2021-07-01 RX ADMIN — LACTULOSE 20 G: 10 SOLUTION ORAL at 13:15

## 2021-07-01 RX ADMIN — LACTULOSE 20 G: 10 SOLUTION ORAL at 20:49

## 2021-07-01 RX ADMIN — ONDANSETRON 4 MG: 2 INJECTION INTRAMUSCULAR; INTRAVENOUS at 13:14

## 2021-07-01 RX ADMIN — PANTOPRAZOLE SODIUM 40 MG: 40 INJECTION, POWDER, FOR SOLUTION INTRAVENOUS at 09:21

## 2021-07-01 RX ADMIN — PHYTONADIONE 10 MG: 10 INJECTION, EMULSION INTRAMUSCULAR; INTRAVENOUS; SUBCUTANEOUS at 09:19

## 2021-07-01 RX ADMIN — POLYETHYLENE GLYCOL 3350 17 G: 17 POWDER, FOR SOLUTION ORAL at 09:21

## 2021-07-01 RX ADMIN — LACTULOSE 20 G: 10 SOLUTION ORAL at 09:20

## 2021-07-01 RX ADMIN — MELATONIN TAB 3 MG 6 MG: 3 TAB at 21:18

## 2021-07-01 RX ADMIN — MEROPENEM 500 MG: 500 INJECTION, POWDER, FOR SOLUTION INTRAVENOUS at 20:56

## 2021-07-01 ASSESSMENT — ACTIVITIES OF DAILY LIVING (ADL)
ADLS_ACUITY_SCORE: 18
ADLS_ACUITY_SCORE: 19
ADLS_ACUITY_SCORE: 18
ADLS_ACUITY_SCORE: 18
ADLS_ACUITY_SCORE: 19
ADLS_ACUITY_SCORE: 19

## 2021-07-01 ASSESSMENT — MIFFLIN-ST. JEOR: SCORE: 1654.75

## 2021-07-01 NOTE — PROGRESS NOTES
MICU Progress Note  Dax Villareal MRN: [id number]  Age: 30 year old, : 1990  07/01/21        ASSESSMENT & PLAN     Dax Villareal is a 30-year-old male with h/o alcohol use disorder and a recent prolonged hospital stay at [facility] in [place] for acute alcoholic hepatitis/end stage liver disease c/b hepatic encephalopathy, acute renal failure leading to hemodialysis, acute hypoxemic respiratory failure, and septic shock 2/2 acute necrotizing pancreatitis. He was transferred to the MICU here on 2021 to manage his necrotizing pancreatitis by the interventional GI team with possible necrosectomy.    Changes today:  - GI/surgery conference recommended no endoscopic transluminal drainage for now, repeat CT if clinically worsening  - SLP recommended dysphagia level 3 diet with thin liquids, ordered  - Zofran PRN ordered as he starts eating  - Midodrine changed from 20mg TID to 10mg PRN before dialysis  - Miralax changed from BID to PRN  - Micafungin discontinued  - Dilaudid discontinued  - [place] [facility] [place]    ===NEURO===  # Hepatic encephalopathy +/- ICU delirium, improving  Was intermittently altered during time at [facility] 2/2 hepatic encephalopathy with possible component of ICU delirium. Also possible contributing factor include underlying septic shock. On admission to [facility] ICU , he was A&Ox3, following commands and answering questions appropriately. Worsened on  and had inability to answer most questions appropriately.  he is improving.  - Miralax changed from BID to PRN  - C/w [place] protocol  - C/w delirium precautions  - C/w melatonin 6mg at bedtime  - Avoid benzos and opioids as able     # Alcohol use disorder  # Recent history of alcohol withdrawal  Appears to have been actively drinking up until [facility] admission. Had alcohol withdrawal early on during that hospitalization, which has now resolved. No concern for ongoing withdrawal.  - C/w folic acid and  thiamine     ===CARDIOVASCULAR===  # Vasopressor dependent shock 2/2 sepsis, now off of vasopressors  Patient was on norepinephrine drip to maintain MAP > 65 at [facility], which was continued upon admission to [facility] ICU. Was also on 20mg Midodrine TID. Etiology of shock most likely 2/2 sepsis given profoundly elevated WBC and procalcitonin in the setting of necrotizing pancreatitis/pancreatic pseudocyst. No active signs of GI bleeding to suggest hemorrhage as a cause. Low suspicion for cardiogenic shock (normal echo findings at OSH on 6/10/2021, EF > 70% without wall motion abnormalities). Was weaned off of Norepinephrine on 6/29 by 0800.  - Midodrine frequency decreased to 10 mg PRN before dialysis sessions  - C/w antimicrobials as below  - MAP goal > 65     ===RESPIRATORY===  # Acute hypoxic respiratory failure due to hypervolemia, improving  Oxygen requirements increased from 2 to 5L after FFP infusions on 6/30, likely due to volume. No signs of anaphylaxis and no high concern for transfusion reaction. Treating with broad antibiotics as below if this is a pneumonia, though more likely this issue is secondary to hypervolemia (renal failure and due for dialysis). Oxygen increased from 5 to 8L during first dialysis session on 6/30 and he was later started on HFNC 40%, 30L. His oxygen requirements decreased and he was on room air the morning of 7/1.  - C/w hemodialysis MWF, per renal recs  - C/w aspiration precautions  - C/w supplemental oxygen to keep sats above 89%     ===GASTROINTESTINAL/NUTRITION===  # Decompensated cirrhosis 2/2 EtOH c/b hepatic encephalopathy and recurrent ascites  # Necrotizing pancreatitis w/ pseudocyst  Patient was initially admitted over a month ago at [facility] for alcoholic hepatitis. He received steroids early in the course for 1 week with initial improvement. CT A/P (6/19) showed possible necrotizing pancreatitis. Repeat CT on 6/25 showed peripancreatic fluid collection consistent with  complications from pancreatitis as well as a 9 cm soft tissue or hemorrhagic structure near pancreatic head concerning for possible internal hemorrhage of pseudocyst. Patient was treated with antibiotics (cefepime, metronidazole, ciprofloxacin, and micafungin). Transferred to [facility] for evaluation by interventional GI for possible necrosectomy. It was felt to not be easily accessible via the GI tract, so IR placed RP drain on 6/30. GI/surgery conference 7/1 recommended no endoscopic transluminal drainage for now.  - GI consulted, recs appreciated     > Hold off on endoscopic transluminal drainage for now - if patient clinically decompensates (intubation, hypotension requiring pressors) please obtain repeat CT scan     > Plan for repeat CT on Sunday, July 4 per GI     > Trend MELD labs     > Vitamin K challenge (6/29-7/1)     > Consider diagnostic paracentesis (check for albumin, protein, lipase/amylase, cell count/cultures) -- held off for now given complex abdominal anatomy and recent RP drain placement     > C/w antibiotics and lactulose (titrate to 3 soft stools per day, no more than 1L)  - IR consulted, recs appreciated  - Bowel meds with rifaxamin and lactulose  - Discontinue dilaudid as he reports no pain and only required one dose yesterday  - C/w pantoprazole 40 mg IV daily     # Severe malnutrition in the context of chronic illness  Multiple NJs placed in the past, most recently on 6/30. He removed it a couple hours later.   - No plans to replace NJ  - Swallow evaluated today --> dysphagia level 3 diet with thin liquids  - Zofran PRN ordered as he begins eating     ===RENAL / FLUIDS / ELECTROLYTES===  # Anuric RADHA  Baseline Cr in May 2021 was 0.82. At [facility], patient had anuric RADHA thought to be secondary to ATN from antibiotics, contrast nephropathy, pancreatitis, and/or septic shock in the setting of underlying liver disease +/- hepatorenal syndrome. Patient was started on HD while at [facility].  Unclear total number of HD runs. Nephrology recommended restarted hemodialysis MWF starting on 6/30.  - Nephrology consulted, recs appreciated     > C/w hemodialysis MWF     > Daily chemistry labs, strict I/O, and daily weights     ===INFECTIOUS DISEASE===     # Septic shock 2/2 necrotizing pancreatitis w/ pseudocyst  Patient treated with antibiotics at OSH. Etiology of shock most likely 2/2 sepsis given profound leukocytosis to 50.6 on admission with elevated procalcitonin. Source of infection most likely intra-abdominal from necrotizing pancreatitis/pancreatic pseudocyst (seen on CT from OSH) vs SBP. Low suspicion for PNA, UTI, or soft tissue/infections.  - Continue meropenem  - Stop micafungin     Antimicrobials/Antivirals:  - Meropenem (6/29 - **)  - Vancomycin (6/29 -6/30)  - Micafungin (6/29 -7/1)      Micro:  Blood cultures pending, no growth after 48 hours     ===HEMATOLOGY===  # Profound leukocytosis  Etiology most likely secondary to profound sepsis.  - Trend with daily CBC  - Management of septic shock as above     # Anemia  # Thrombocytopenia, resolved  # Coagulopathy  Etiology most likely secondary to underlying liver disease. 2 units FFP given on 6/30 with a decrease in INR from 3.56 to 2.13.  - Continue to monitor for signs of bleeding  - Transfuse if hemoglobin < 7, platelet < 10 or < 30 with bleeding     ===ENDOCRINE===  No active issues     ===SKIN/MSK===  # Critical illness myopathy  # Deconditioning  - PT/OT consulted following     # Buttock wound  - WOC following    Prophylaxis:  DVT: SCDs, high bleeding risk GI: On PPI  Family:  Updated sister over the phone at 14:20 pm  [place] [facility] [place]  Code Status: Full code    Lines:   - PICC  - Right HD line  - Left back drain  - Rectal tube     Patient was discussed with staff attending, Tin Freeman MD.    Marcia Brumfield, MS4      I saw and examined the patient with the medical student. I agree with the contents of this note.   Anitha  "SAYDA Valdivia       Interval Events   Nursing notes reviewed. Had hemodialysis yesterday with 1.5L removed. Noted to have hypotension (SBP in the 90s) and increased oxygen demands (from 5 to 8L) during the session. A repeat CXR was ordered showing increased perihilar and bibasilar interstital and airspace opactities and increased small left and trace right pleural effusions. He was later put on HFNC 40%, 30L. He also pulled out his NJ yesterday. His IR drain had an output of over 1 L yesterday (around 100ml so far this morning) and was noted to be dark brown/black soft/watery. Drain was irrigated 2x with 10ml wach time. Patient reports no pain this morning and is still confused, similar to yesterday.       Objective   VITAL SIGNS:  /67   Pulse 115   Temp 99.1  F (37.3  C) (Oral)   Resp 19   Ht 1.676 m (5' 6\")   Wt 75.7 kg (166 lb 14.2 oz)   SpO2 98%   BMI 26.94 kg/m      Intake/Output Summary (Last 24 hours) at 7/1/2021 0733  Last data filed at 7/1/2021 0700  Gross per 24 hour   Intake 910 ml   Output 3650 ml   Net -2740 ml     Wt:   Wt Readings from Last 2 Encounters:   07/01/21 75.7 kg (166 lb 14.2 oz)   05/18/21 84.5 kg (186 lb 4.8 oz)        Vent settins:   FiO2 (%): 40 %  Resp: 19    BP - Mean:  [65-88] 77    Gen: NAD, not in pain  HEENT: scleral icterus, EOMI, head atraumatic  Cardio: tachycardic, normal s1,s2, no rubs, murmurs, or gallops  Pulm: course breathe sounds bilaterally (decreased from yesterday morning), tachypnic, no wheezes or retractions, HFNC in place at 40%, 30L  Abd: soft and non-tender, distended, has a rectal tube in with green output. Left IR drain output is minimal (around 10ml at time of exam) and is dark gray/black and soft/watery.  Skin: jaundice  Neuro: slightly improved mental state from yesterday, motor grossly nonfocal      DIAGNOSTIC STUDIES:   BMP  Recent Labs   Lab Test 07/01/21  0430 07/01/21  0014 06/30/21  0445 06/29/21  0410 06/28/21  1839 05/18/21  1220 " 12/09/20 1837 12/09/20 1837     --  138 138 134 127*   < >  --    POTASSIUM 3.7 3.7 3.1* 3.3* 3.4 2.0*   < > 3.3*   CHLORIDE 107  --  104 103 101 70*   < >  --    CO2 22  --  22 25 27 42*   < >  --    ANIONGAP 10  --  14 9 6 15*   < >  --    LACT  --   --   --   --  1.9 7.7*  --  1.9   BUN 23  --  34* 20 14 10   < >  --    CR 2.32*  --  3.02* 2.25* 1.78* 0.82   < >  --    *  --  130* 112* 119* 89   < >  --    JANENE 8.8  --  8.4* 7.9* 8.2* 13.4*   < >  --    MAG  --   --  2.4* 2.0  --  1.6   < >  --    PHOS  --   --  5.3* 2.3*  --   --   --   --     < > = values in this interval not displayed.     Heme   Recent Labs   Lab Test 07/01/21 0430 06/30/21  0700 06/30/21 0445 06/29/21 0410 06/28/21 1839 05/18/21  1220 05/18/21  1220   WBC 44.7*  --  48.0* 49.8* 50.6*  --  13.2*   ANEU  --   --   --   --  44.0*  --  12.0*   ALYM  --   --   --   --  0.0*  --  0.6*   AEOS  --   --   --   --  0.5  --  0.0   HGB 7.4*  --  7.7* 7.4* 7.6*  --  16.6     --  100 100 101*  --  98     --  150 143* 127*  --  222   INR 2.19* 2.13* 2.22* 3.54* 3.76*   < >  --     < > = values in this interval not displayed.     Hepatic   Recent Labs   Lab 07/01/21  0430 06/30/21  0445 06/29/21  0410 06/28/21  1839   ALKPHOS 123 105 88 90   AST 70* 55* 70* 74*   ALT 27 24 23 23   BILITOTAL 20.7* 21.1* 19.5* 21.3*   PROTTOTAL 6.9 7.0 6.4* 6.8   ALBUMIN 2.6* 2.8* 2.7* 3.0*      Iron No lab results found.  ABG/VBG   Recent Labs   Lab Test 07/01/21  0014 06/30/21  1716 06/28/21  1839   O2PER 60.0 5L 2L   PHV 7.45* 7.46* 7.44*   PCO2V 37* 37* 39*       Urine Studies:   Recent Labs   Lab Test 12/09/20  1817 10/06/20  1214   SG 1.010 1.021   URINEPH 7.0 7.0   NITRITE Negative Negative   LEUKEST Negative Negative   WBCU 0 1   RBCU 0 1   PROTEIN 10* 10*       Microbiology:  No results found for: RS, FLUAG    Recent Labs   Lab Test 06/30/21  0850 06/29/21  0559 06/29/21  0402 06/29/21  0354 05/18/21  1230 05/18/21  1219   CULT Culture  negative monitoring continues  Culture negative monitoring continues  --  No growth after 2 days No growth after 2 days No growth after 6 days No growth after 6 days   SDES Peritoneal cavity Abscess  Peritoneal cavity Abscess  Peritoneal cavity Abscess Nares Blood Left Hand Blood Right Arm Blood Blood       Imaging:  Recent Results (from the past 24 hour(s))   XR Chest Port 1 View    Narrative    Chest radiograph  6/30/2021 11:53 PM      HISTORY: Evaluation of transient hypoxemia    COMPARISON: 6/30/2021    FINDINGS:   Single AP view of the chest. Right IJ central line tip overlying the  superior cavoatrial junction. Left upper extremity PICC tip overlying  the low SVC. Trachea is midline. Cardiac silhouette is similar in  size. No pneumothorax. Increased small left and trace right pleural  effusions. Increased perihilar and bibasilar interstitial and airspace  opacities, right greater than left.      Impression    IMPRESSION:   1. Stable support devices.  2. Cardiomegaly with increased perihilar and bibasilar interstitial  and airspace opacities, right greater than left.  3. Increased small left and trace right pleural effusions.    I have personally reviewed the examination and initial interpretation  and I agree with the findings.    CHELLE STARKS MD          SYSTEM ID:  [id number]          Medications     Medications    folic acid  1 mg Oral Daily    Or     folic acid  1 mg Intravenous Daily     lactulose  20 g Oral or NG Tube TID     melatonin  6 mg Oral At Bedtime     meropenem  500 mg Intravenous Q24H     pantoprazole (PROTONIX) IV  40 mg Intravenous Daily with breakfast     rifaximin  550 mg Oral BID     sodium chloride (PF)  10 mL Irrigation Q8H     vitamin B1  100 mg Oral Daily    Or     thiamine  100 mg Intravenous Daily       - MEDICATION INSTRUCTIONS -

## 2021-07-01 NOTE — PROGRESS NOTES
Resident/Fellow Attestation   I, Eris Miles, was present with the medical/NAHUM student who participated in the service and in the documentation of the note.  I have verified the history and personally performed the physical exam and medical decision making.  I agree with the assessment and plan of care as documented in the note.      Eris Miles MD  PGY2  Date of Service (when I saw the patient): 07/01/21    [facility]    Progress Note - [place] 3 Service        Date of Admission:  6/28/2021    Assessment & Plan     Dax Villareal is a 30 year old male with hx of alcohol use disorder admitted on 6/28/2021 after recent prolonged admission at [facility] for acute alcoholic hepatitis and ESLD c/b hepatic encephalopathy, ARF requiring hemodialysis, acute respiratory failure and septic shock in the setting of acute necrotizing pancreatitis. He was transferred out of the MICU on 7/1/21 for continued monitoring and management.     #Septic shock 2/2 necrotizing pancreatitis w/ pseudocyst, improving  #Profound leukocytosis  Etiology of the septic shock likely due to necrotizing pancreatitis vs SBP. With pancreatic collection s/p percutaneous drain, lower suspicion at this time for SBP, however if he clinically decompensates could consider a diagnostic tap. GI/surgery conference 7/1 recommended no endoscopic transluminal drainage for now.     - Continue meropenem 500 mg q24h  - Follow up cultures   - Daily CBC  - MAP goal > 65  - GI consulted, recs appreciated     > If patient clinically decompensates, obtain repeat CT scan     > Plan for repeat CT on Sunday, July 4     > Consider diagnostic paracentesis    # Decompensated EtOH cirrhosis c/b hepatic encephalopathy and recurrent ascites  # Hx of Alcohol use disorder c/b withdrawal   # Anemia  # Coagulopathy   Patient with a history of marked alcohol use and alcoholic hepatitis in the past. Was given a  course of steroids (1 week) during previous admission at OSH. Holding off on paracentesis at this time due to known pancreatic collection which is likely reason for infectious presentation; however, will consider para to rule out SBP if clinically not improving.     - Lactulose 20g TID and PRN and rifaximin 550mg BID (titrate to 1L stool/day)  - Trend MELD labs  - Daily folic acid and thiamine  - Delirium precautions  - Melatonin 6mg at bedtime  - Avoid benzos and opioids as able  - Transfuse if hemoglobin < 7, platelet < 10 or < 30 with bleeding  - Continue to monitor for signs of bleeding    MELD-Na score: 35 at 7/1/2021  4:30 AM  MELD score: 35 at 7/1/2021  4:30 AM  Calculated from:  Serum Creatinine: 2.32 mg/dL at 7/1/2021  4:30 AM  Serum Sodium: 139 mmol/L (Rounded to 137 mmol/L) at 7/1/2021  4:30 AM  Total Bilirubin: 20.7 mg/dL at 7/1/2021  4:30 AM  INR(ratio): 2.19 at 7/1/2021  4:30 AM  Age: 30 years 8 months    # ARF on HD (MWF)  # Hypervolemia causing acute respiratory failure, improving  - Nephrology consulted, recs appreciated     > Hemodialysis MWF   - Midodrine 10 mg PRN before dialysis sessions     > Daily chemistry labs, strict I/O, and daily weights  - Aspiration precautions  - Supplemental oxygen as needed to keep sats above 89%    # Severe malnutrition in setting of chronic illness  - Dysphagia level 3 diet with thin liquids  - No plans to replace NJ  - Continue multivitamins  - Pantoprazole 40 mg IV daily  - Zofran PRN     Diet: Snacks/Supplements Adult: Ensure Clear; With Meals  Dysphagia Diet Level 3 Advanced Thin Liquids (water, ice chips, juice, milk gelatin, ice cream, etc)    DVT Prophylaxis: Pneumatic Compression Devices  Rdz Catheter: Not present  Fluids: NS 5 mL/hr TKO  Central Lines: PRESENT  PICC Triple Lumen 06/28/21 Left-Site Assessment: WDL  CVC Double Lumen Right-Site Assessment: WDL  Code Status: Full Code      Disposition Plan   Expected discharge: > 7 days, recommended to  transitional care unit once mental status at baseline, renal function improved, safe disposition plan/ TCU bed available and SIRS/Sepsis treated.     The patient's care was discussed with the Attending Physician, Dr. Gonzales.    Jaime Amin, MS3  Medical Student  [facility] 3 Service  [facility]  Securely message with the Vocera Web Console (learn more here)  Text page via [facility] Paging/Directory  Please see sign in/sign out for up to date coverage information    Risk Factors Present on Admission           # Severe Malnutrition, POA: based on Reduced intake;Subcutaneous fat loss;Muscle loss (06/30/21 1100)     ______________________________________________________________________    Interval History   Patient seen and examined around 1345. Dax appears comfortable and is receptive to questions, though he does not answer all of them. He was able to eat some lunch. He is stooling well with 700 mL of stool so far with a goal of 1L by the end of the day.     Data reviewed today: I reviewed all medications, new labs and imaging results over the last 24 hours. I personally reviewed the chest x-ray image(s) showing cardiomegaly with perihilar and bibasilar interstitial and airspace opacities, small L and trace R pleural effusions.    Physical Exam   Vital Signs: Temp: 99  F (37.2  C) Temp src: Oral BP: 102/62 Pulse: 122   Resp: (!) 38 SpO2: 94 % O2 Device: None (Room air) Oxygen Delivery: 5 LPM  Weight: 166 lbs 14.21 oz    Gen: Sitting up in chair, appears comfortable  HEENT: Conjunctival icterus, jaundice under tongue  CV: Tachycardic. Peripheral perfusion intact  Resp: Tachypneic  Abd: Soft, distended, perc drain with dark red/brown liquid output, liquid yellow/green stool in rectal tube  Skin: Jaundiced  Ext: Well perfused, no edema  Mental status/Psych: Alert, oriented to place and self but not date. Responds to most questions, but does not answer some.     Data   Recent Labs    Lab 07/01/21  0430 07/01/21  0014 06/30/21  0700 06/30/21  0445 06/29/21  0410 06/28/21  1839   WBC 44.7*  --   --  48.0* 49.8* 50.6*   HGB 7.4*  --   --  7.7* 7.4* 7.6*     --   --  100 100 101*     --   --  150 143* 127*   INR 2.19*  --  2.13* 2.22* 3.54* 3.76*     --   --  138 138 134   POTASSIUM 3.7 3.7  --  3.1* 3.3* 3.4   CHLORIDE 107  --   --  104 103 101   CO2 22  --   --  22 25 27   BUN 23  --   --  34* 20 14   CR 2.32*  --   --  3.02* 2.25* 1.78*   ANIONGAP 10  --   --  14 9 6   JANENE 8.8  --   --  8.4* 7.9* 8.2*   *  --   --  130* 112* 119*   ALBUMIN 2.6*  --   --  2.8* 2.7* 3.0*   PROTTOTAL 6.9  --   --  7.0 6.4* 6.8   BILITOTAL 20.7*  --   --  21.1* 19.5* 21.3*   ALKPHOS 123  --   --  105 88 90   ALT 27  --   --  24 23 23   AST 70*  --   --  55* 70* 74*   LIPASE  --   --   --   --   --  199     Recent Results (from the past 24 hour(s))   XR Chest Port 1 View    Narrative    Chest radiograph  6/30/2021 11:53 PM      HISTORY: Evaluation of transient hypoxemia    COMPARISON: 6/30/2021    FINDINGS:   Single AP view of the chest. Right IJ central line tip overlying the  superior cavoatrial junction. Left upper extremity PICC tip overlying  the low SVC. Trachea is midline. Cardiac silhouette is similar in  size. No pneumothorax. Increased small left and trace right pleural  effusions. Increased perihilar and bibasilar interstitial and airspace  opacities, right greater than left.      Impression    IMPRESSION:   1. Stable support devices.  2. Cardiomegaly with increased perihilar and bibasilar interstitial  and airspace opacities, right greater than left.  3. Increased small left and trace right pleural effusions.    I have personally reviewed the examination and initial interpretation  and I agree with the findings.    CHELLE STARKS MD          SYSTEM ID:  [id number]

## 2021-07-01 NOTE — PROGRESS NOTES
GASTROENTEROLOGY PROGRESS NOTE    Date of Admission: 6/28/2021  Reason for Admission: pancreatitis/hepatitis      ASSESSMENT/RECOMMENDATIONS:  30 year old male with a history of heavy alcohol use who presented to OSH 5/19 for abdominal pain, nausea and vomiting, admitted for alcohol hepatitis, ESLD, RADHA on HD, septic shock requiring pressors as well as necrotizing pancreatitis with large evolving necrotic collection. Transferred to [facility] for consideration of drainage of presumed infected necrotic collection     #. Acute necrotizing pancreatitis with presumed infected walled off necrotic collection  #. Septic shock requiring vasopressors  #. Leukocytosis  Unclear evolution of pancreatitis history but had a recent CT scan with large evolving necrotic collection, presumed infected. No obvious gas in collection. CT on admission to [facility] showing mature collection with enhancing wall measuring ~[id number]. Unfortunately he has profound coagulopathy (liver disease + malnutrition +sepsis), INR 3.8 on transfer so was given FFP and Vit K and went for IR perc drain am of 6/29. He may eventually warrant endoscopic transluminal drainage but will follow clinical course - hold off for now.   Etiology: alcohol  Date of onset: 5/19  BISAP score on admission: unclear  Concurrent organ failure: respiratory (now extubated), renal (on HD), liver, cardiogenic (weaned off pressors 6/29)  Thromboses: none  Diabetes: no  Current nutrition access: NJT placed 6/30 but patient removed it shortly after  Last imaging: CT scan with IV contrast 6/29 with large walled off necrotic collection  Infection/antiinfectives:                 - Meropenem (6/29 - 6/30)                - Vancomycin (6/29 - 6/30)     - Micafungin (6/29 - *)      - Zosyn (6/30 - *)  Intervention:    6/30 - Left IR RP perc drain (24F)    -Hold off on endoscopic transluminal drainage for now - if patient clinically decompensates (intubation, hypotension requiring pressors)  "please obtain repeat CT scan ideally with IV contrast  -If stable over weekend please obtain CT scan abd/pelv (ideally with IV contrast) on Sunday - to be reviewed by our team and possibly plan for endoscopic transluminal drain next week  -Drain management per IR, await cultures from drain output  -NPO  -Ideally would replace NJFT, calorie couts  -Continue antibiotics, follow cultures     #. Alcohol hepatitis  #. Hyperbilirubinemia  #. Ascites  #. Coagulopathy/Thrombocytopenia  #. Encephalopathy  Known heavy alcohol use. Reportedly received course of steroids at OSH for 1 week with \"some improvement\". Currently has primarily hyperbilirubinemia and mildly elevated AST. Unclear if he has underlying cirrhosis but certainly would fit with alcohol hepatitis vs elevated liver tests of critical illness/sepsis. Not candidate for steroids right now. He is receiving lactulose and Xifaxin for encephalopathy. Hepatology notified of admission, ongoing discussions with them  -Trend MELD labs  -Vitamin K 10mg IV x 3 days  -Consider diagnostic paracentesis (check for albumin, protein, lipase/amylase, cell count/cultures)  -Continue lactulose and titrate to 3 soft stools per day (no more than 1L)  -Alcohol abstinence    The patient was discussed and plan agreed upon with GI staff, Dr Quigley.      Brianna Norwood PA-C  Advanced Endoscopy/Pancreaticobiliary GI Service  [facility]  Text Page  _______________________________________________________________      Subjective: Nursing notes and 24hr events reviewed. Patient seen and examined at 1000. Patient removed NJFT yesterday afternoon. Increased O2 requirements but stable this morning. No pressors. No fevers overnight.    ROS:   4 pt ROS negative unless noted in subjective.     Objective:  Blood pressure 109/67, pulse 115, temperature 99.1  F (37.3  C), temperature source Oral, resp. rate 19, height 1.676 m (5' 6\"), weight 75.7 kg (166 lb 14.2 oz), SpO2 " 98 %.  Gen: Sitting in chair at bedside. Appears comfortable  HEENT: NCAT. Conjunctiva clear. Sclera ++icteric  CV: tachycardic, Peripheral perfusion intact  Resp: tachypneic, facemask high flow  Abd: distended, not tender, dark red/brown output in perc drain, green liquid stool in rectal tube  Msk: no gross deformity  Skin:  ++ jaundice  Ext: warm, well perfused  Neuro: no asterixis but generally tremulous  Mental status/Psych: alert, garbled speech and difficult to understand      Date 07/01/21 0700 - 07/02/21 0659   Shift 3474-8665 1543-2457 9532-4404 24 Hour Total   INTAKE   I.V. 5   5   Enteral 0   0   Shift Total(mL/kg) 5(0.07)   5(0.07)   OUTPUT   Shift Total(mL/kg)       Weight (kg) 75.7 75.7 75.7 75.7       LABS:  BMP  Recent Labs   Lab 07/01/21  0430 07/01/21  0014 06/30/21 0445 06/29/21  0410 06/28/21  1839     --  138 138 134   POTASSIUM 3.7 3.7 3.1* 3.3* 3.4   CHLORIDE 107  --  104 103 101   JANENE 8.8  --  8.4* 7.9* 8.2*   CO2 22  --  22 25 27   BUN 23  --  34* 20 14   CR 2.32*  --  3.02* 2.25* 1.78*   *  --  130* 112* 119*     CBC  Recent Labs   Lab 07/01/21  0430 06/30/21  0445 06/29/21  0410 06/28/21  1839   WBC 44.7* 48.0* 49.8* 50.6*   RBC 2.30* 2.34* 2.27* 2.32*   HGB 7.4* 7.7* 7.4* 7.6*   HCT 23.1* 23.4* 22.8* 23.4*    100 100 101*   MCH 32.2 32.9 32.6 32.8   MCHC 32.0 32.9 32.5 32.5   RDW Dimorphic population - unable to calculate Dimorphic population - unable to calculate Dimorphic population - unable to calculate Dimorphic population - unable to calculate    150 143* 127*     INR  Recent Labs   Lab 07/01/21  0430 06/30/21  0700 06/30/21  0445 06/29/21  0410   INR 2.19* 2.13* 2.22* 3.54*     LFTs  Recent Labs   Lab 07/01/21  0430 06/30/21  0445 06/29/21  0410 06/28/21  1839   ALKPHOS 123 105 88 90   AST 70* 55* 70* 74*   ALT 27 24 23 23   BILITOTAL 20.7* 21.1* 19.5* 21.3*   PROTTOTAL 6.9 7.0 6.4* 6.8   ALBUMIN 2.6* 2.8* 2.7* 3.0*      PANC  Recent Labs   Lab  06/28/21  1839   LIPASE 199         IMAGING:  reviewed

## 2021-07-01 NOTE — PROGRESS NOTES
Nephrology Progress Note  07/01/2021     Dax Villareal is a 30 year old male with h/o ETOH hepatitis, end stage liver disease, RADHA on HD, transferred from [facility] in [place] for septic shock on 6/28/21 for treatment of necrotizing pancreatitis and decompensated liver disease. He was initially admitted to [facility] 5/19/21.       Assessment & Recommendations:     1 RADHA - Creat today reflective of HD yesterday. He is anuric. Last HD prior to hospital admission was potentially 6/26/21. He has not had any OP HD at this time. Has been receiving inpatient HD at [facility] prior to transfer to [facility]   - Etiology of RADHA felt to be JORGE L, abx, pancreatitis, sepsis, HRS   - Has Right TDC   - Will continue MWF schedule   - Had contrast 6/29 for evaluation of his pancreatitis.    - CRRT/HD consent obtained from sister Noni per phone   - TB is 20.7 which would also contribute to bilirubin toxicity   - Monitor for any recovery with daily chemistry labs/strict I/O and daily weights     2. Volume status - Hypervolemia with hypoxia/ascites, on supplemental oxygen. Has bilateral small pleural effusions at lung bases on CT 6/29.   Weight will be unreliable given large ascites. SBP ~ 100/ Intake 1587 ml/Output: UO none, Stool 1350 ml, Drain 1050 ml, UF 1500 ml for net fluid loss of 2.3 Kg    - Strict I/O please     3. CV - Shock 2/2 Sepsis: Echo on 6/10/21 showed EF of > 70% w/WMA. Off pressor support. SBP ~ 100/   - Per ICU     4. ETOH hepatitis/ recurrent ascites, HE - Patient with heavy ETOH use. Last ETOH unknown. Currently on Lactulose/Rifax, thiamine, Folate. T bili 20.7   - Per GI     5. Electrolytes - No acute concerns. K 3.7, Na 139      6.  Acid base - No acute concerns. Bicarb 22     7. BMD - Ca 8.8, Phos 5.3, albumin 2.6   - Will correct Phos on HD     8. Antimicrobials - Meropenem. Off Vanco. BC NTD. Vanc level 31.2  WBC 44.7, Afebrile    Recommendations were communicated to primary team via progress note    Seen and discussed  "with Dr. Connor Toledo, NP   [phone]    Interval History :   Nursing and provider notes from last 24 hours reviewed.  Tolerated routine HD yesterday w/o complication  Remains anuric  No acute renal concerns    Review of Systems:   I reviewed the following systems:  Unreliable given encephalopathy    Physical Exam:   I/O last 3 completed shifts:  In: 905 [P.O.:360; I.V.:395; Other:30; NG/GT:90]  Out: 3650 [Drains:1150; Other:1500; Stool:1000]   /62   Pulse 122   Temp 99  F (37.2  C) (Oral)   Resp (!) 38   Ht 1.676 m (5' 6\")   Wt 75.7 kg (166 lb 14.2 oz)   SpO2 94%   BMI 26.94 kg/m       GENERAL APPEARANCE: Sitting up to chair this morning. Calm, NAD, jaundiced  EYES:  scleral icterus, pupils equal  Pulmonary: lungs clear to auscultation. On supplemental oxygen per mask   CV: tachy   - Edema - no LE edema  GI: large distended, tender. Has rectal tube and retroperitoneal drain  MS: no evidence of inflammation in joints, no muscle tenderness  SKIN: no rash, warm, dry, no cyanosis  NEURO: encephalopathic   ACCESS: Right TDC     Labs:   All labs reviewed by me  Electrolytes/Renal -   Recent Labs   Lab Test 07/01/21  0430 07/01/21  0014 06/30/21  0445 06/29/21  0410 05/18/21  1220 05/18/21  1220     --  138 138   < > 127*   POTASSIUM 3.7 3.7 3.1* 3.3*   < > 2.0*   CHLORIDE 107  --  104 103   < > 70*   CO2 22  --  22 25   < > 42*   BUN 23  --  34* 20   < > 10   CR 2.32*  --  3.02* 2.25*   < > 0.82   *  --  130* 112*   < > 89   JANENE 8.8  --  8.4* 7.9*   < > 13.4*   MAG  --   --  2.4* 2.0  --  1.6   PHOS  --   --  5.3* 2.3*  --   --     < > = values in this interval not displayed.       CBC -   Recent Labs   Lab Test 07/01/21  0430 06/30/21  0445 06/29/21  0410   WBC 44.7* 48.0* 49.8*   HGB 7.4* 7.7* 7.4*    150 143*       LFTs -   Recent Labs   Lab Test 07/01/21 0430 06/30/21  0445 06/29/21  0410   ALKPHOS 123 105 88   BILITOTAL 20.7* 21.1* 19.5*   ALT 27 24 23   AST 70* 55* " 70*   PROTTOTAL 6.9 7.0 6.4*   ALBUMIN 2.6* 2.8* 2.7*       Iron Panel - No lab results found.      Imaging:  XR CHEST PORT 1 VIEW  6/30/2021 4:35 AM       HISTORY: eval for worsening hypoxemia     COMPARISON: 6/29/2021     FINDINGS:   Single AP view of the chest. Tunneled right IJ central line tip  overlying the high right atrium. Left upper extremity PICC tip  overlying the low SVC. Trachea is midline. Cardiac silhouette is  similar in size. No pneumothorax. Decreased probable trace bilateral  pleural effusions. Large unchanged mild diffuse interstitial and  airspace opacities. Mildly increased left basilar streaky opacities.  Nonspecific focal subglottic tracheal narrowing.                                                                      IMPRESSION:   1. Stable support devices.  2. Cardiomegaly with largely unchanged mild diffuse interstitial and  airspace opacities.  3. Decreased probable trace bilateral pleural effusions with mildly  increased left basilar atelectasis.  4. Nonspecific subglottic focal tracheal narrowing.     I have personally reviewed the examination and initial interpretation  and I agree with the findings.     MIGUEL WALKER MD    Current Medications:    folic acid  1 mg Oral Daily    Or     folic acid  1 mg Intravenous Daily     lactulose  20 g Oral or NG Tube TID     melatonin  6 mg Oral At Bedtime     meropenem  500 mg Intravenous Q24H     pantoprazole (PROTONIX) IV  40 mg Intravenous Daily with breakfast     rifaximin  550 mg Oral BID     sodium chloride (PF)  10 mL Irrigation Q8H     vitamin B1  100 mg Oral Daily    Or     thiamine  100 mg Intravenous Daily       - MEDICATION INSTRUCTIONS -       Tory Toledo, DOROTHY

## 2021-07-01 NOTE — PROVIDER NOTIFICATION
Notified MD that pt pulled out FT. Pt tolerated miralax, lactulose, rifaximin, and KCl PO. Will attempt to replace FT tonight if trained RN available.

## 2021-07-01 NOTE — PLAN OF CARE
Major Shift Events: Pt vitally stable on room air, is tachycardic in the 120's. Midodrine on hold except for before dialysis. Only oriented to self, but does follow commands in all extremities. Approved for bite-sized diet by speech, but still only eating a little and favoring liquids. Anuric, rectal tube having good output with scheduled lactulose. Minimal drain output. Up to chair with lift. Sister Noni updated and will be coming in.  Plan: Transferring to the floor. HD run tomorrow.  For vital signs and complete assessments, please see documentation flowsheets.       Problem: Adult Inpatient Plan of Care  Goal: Absence of Hospital-Acquired Illness or Injury  Outcome: No Change  Intervention: Identify and Manage Fall Risk  Recent Flowsheet Documentation  Taken 7/1/2021 1200 by Geoff Zaman, RN  Safety Promotion/Fall Prevention:    chair alarm on    clutter free environment maintained    lighting adjusted    increase visualization of patient    room door open  Taken 7/1/2021 0900 by Geoff Zaman, RN  Safety Promotion/Fall Prevention:    chair alarm on    clutter free environment maintained    lighting adjusted    increase visualization of patient    room door open  Intervention: Prevent Skin Injury  Recent Flowsheet Documentation  Taken 7/1/2021 1400 by Geoff Zaman, RN  Body Position:    right    heels elevated    side-lying 30 degrees  Intervention: Prevent and Manage VTE (Venous Thromboembolism) Risk  Recent Flowsheet Documentation  Taken 7/1/2021 1200 by Geoff Zaman, RN  VTE Prevention/Management:    pneumatic compression device    bleeding risk assessed  Taken 7/1/2021 0900 by Geoff Zaman, RN  VTE Prevention/Management:    pneumatic compression device    bleeding risk assessed     Problem: Attention and Thought Clarity Impairment (Delirium)  Goal: Improved Attention and Thought Clarity  Outcome: No Change     Problem: Fluid Imbalance (Pancreatitis)  Goal: Fluid Balance  Outcome: No Change      Problem: Adult Inpatient Plan of Care  Goal: Plan of Care Review  Outcome: Improving     Problem: Altered Behavior (Delirium)  Goal: Improved Behavioral Control  Outcome: Improving     Problem: Nutrition Impaired (Pancreatitis)  Goal: Optimal Nutrition Intake  Outcome: Improving     Problem: Pain (Pancreatitis)  Goal: Acceptable Pain Control  Outcome: Improving     Problem: Respiratory Compromise (Pancreatitis)  Goal: Effective Oxygenation and Ventilation  Intervention: Optimize Oxygenation and Ventilation  Recent Flowsheet Documentation  Taken 7/1/2021 1400 by Geoff Zaman, RN  Activity Management: bedrest  Head of Bed (HOB) Positioning: HOB at 20-30 degrees  Taken 7/1/2021 1200 by Geoff Zaman, RN  Cough And Deep Breathing: done with encouragement  Activity Management: up in chair  Taken 7/1/2021 0900 by Geoff Zaman, RN  Cough And Deep Breathing: done with encouragement  Activity Management: up in chair

## 2021-07-01 NOTE — PROGRESS NOTES
Nutrition Services - Brief Note    FT placed with nasal bridle by this writer yesterday but pt pulled it out a few hours later. He has a full liquid diet ordered. Unfortunately pt has a h/o removing FT.    Interventions already implemented by the RD:  Discontinued TF order set. No plans to replace FT if pt is not going to keep it in.    Recommendations:  Advance diet as tolerated, rec low fat diet for pancreatitis.     RD will continue to follow.  Frances Zambrano, TERENCE, LD  ([facility] dietitian, [phone] (Mon-Fri))

## 2021-07-01 NOTE — SIGNIFICANT EVENT
SPIRITUAL HEALTH SERVICES Significant Event  [employer]  [facility] ([place] 4a    Pt anointed by Father Johnny Alvarez   Pager [phone]

## 2021-07-01 NOTE — PROGRESS NOTES
Major Shift Events: Patient remains confused and pulled out FT around 2000; no new FT placed overnight. Patient was previously on 10mL per hour of TFs with a goal of 50mL/hr and to advance 10mL Q8H. Patient afebrile overnight. Sinus tach with HR in 110s-120s. Patient slightly decompensated respiratory wise overnight and went from 5L Oxymask to HFNC; currently on 40%, 30L. Had Chest X-ray to ensure didn't silent aspirate taking meds that were going to go through FT. Rectal tube patent. K+ replaced on shift.     Plan: Get another FT placed today. Continue to monitor.     For vital signs and complete assessments, please see documentation flowsheets.

## 2021-07-02 ENCOUNTER — APPOINTMENT (OUTPATIENT)
Dept: PHYSICAL THERAPY | Facility: CLINIC | Age: 31
End: 2021-07-02
Attending: INTERNAL MEDICINE
Payer: MEDICAID

## 2021-07-02 LAB
ALBUMIN FLD-MCNC: 2 G/DL
ALBUMIN SERPL-MCNC: 2.4 G/DL (ref 3.4–5)
ALP SERPL-CCNC: 144 U/L (ref 40–150)
ALT SERPL W P-5'-P-CCNC: 26 U/L (ref 0–70)
ANION GAP SERPL CALCULATED.3IONS-SCNC: 10 MMOL/L (ref 3–14)
APPEARANCE FLD: NORMAL
AST SERPL W P-5'-P-CCNC: 80 U/L (ref 0–45)
BILIRUB SERPL-MCNC: 21 MG/DL (ref 0.2–1.3)
BUN SERPL-MCNC: 35 MG/DL (ref 7–30)
CALCIUM SERPL-MCNC: 8.2 MG/DL (ref 8.5–10.1)
CHLORIDE SERPL-SCNC: 107 MMOL/L (ref 94–109)
CO2 SERPL-SCNC: 20 MMOL/L (ref 20–32)
COLOR FLD: NORMAL
CREAT SERPL-MCNC: 3.28 MG/DL (ref 0.66–1.25)
ERYTHROCYTE [DISTWIDTH] IN BLOOD BY AUTOMATED COUNT: 29.9 % (ref 10–15)
GFR SERPL CREATININE-BSD FRML MDRD: 24 ML/MIN/{1.73_M2}
GLUCOSE SERPL-MCNC: 113 MG/DL (ref 70–99)
GRAM STN SPEC: NORMAL
GRAM STN SPEC: NORMAL
HCT VFR BLD AUTO: 22 % (ref 40–53)
HGB BLD-MCNC: 7 G/DL (ref 13.3–17.7)
INR PPP: 2.24 (ref 0.86–1.14)
INTERPRETATION ECG - MUSE: NORMAL
LACTATE BLD-SCNC: 1.2 MMOL/L (ref 0.7–2)
LYMPHOCYTES NFR FLD MANUAL: 19 %
MCH RBC QN AUTO: 32.4 PG (ref 26.5–33)
MCHC RBC AUTO-ENTMCNC: 31.8 G/DL (ref 31.5–36.5)
MCV RBC AUTO: 102 FL (ref 78–100)
MONOS+MACROS NFR FLD MANUAL: 7 %
NEUTS BAND NFR FLD MANUAL: 74 %
PLATELET # BLD AUTO: 184 10E9/L (ref 150–450)
POTASSIUM SERPL-SCNC: 3.3 MMOL/L (ref 3.4–5.3)
PROT FLD-MCNC: 4.1 G/DL
PROT SERPL-MCNC: 6.8 G/DL (ref 6.8–8.8)
RBC # BLD AUTO: 2.16 10E12/L (ref 4.4–5.9)
SODIUM SERPL-SCNC: 137 MMOL/L (ref 133–144)
SPECIMEN SOURCE FLD: NORMAL
SPECIMEN SOURCE: NORMAL
WBC # BLD AUTO: 38.7 10E9/L (ref 4–11)
WBC # FLD AUTO: 2663 /UL

## 2021-07-02 PROCEDURE — 250N000013 HC RX MED GY IP 250 OP 250 PS 637: Performed by: STUDENT IN AN ORGANIZED HEALTH CARE EDUCATION/TRAINING PROGRAM

## 2021-07-02 PROCEDURE — 84157 ASSAY OF PROTEIN OTHER: CPT | Performed by: INTERNAL MEDICINE

## 2021-07-02 PROCEDURE — 49083 ABD PARACENTESIS W/IMAGING: CPT | Performed by: PEDIATRICS

## 2021-07-02 PROCEDURE — 87205 SMEAR GRAM STAIN: CPT | Performed by: INTERNAL MEDICINE

## 2021-07-02 PROCEDURE — 83605 ASSAY OF LACTIC ACID: CPT | Performed by: INTERNAL MEDICINE

## 2021-07-02 PROCEDURE — 99233 SBSQ HOSP IP/OBS HIGH 50: CPT | Performed by: STUDENT IN AN ORGANIZED HEALTH CARE EDUCATION/TRAINING PROGRAM

## 2021-07-02 PROCEDURE — 99233 SBSQ HOSP IP/OBS HIGH 50: CPT | Mod: GC | Performed by: INTERNAL MEDICINE

## 2021-07-02 PROCEDURE — P9047 ALBUMIN (HUMAN), 25%, 50ML: HCPCS | Performed by: STUDENT IN AN ORGANIZED HEALTH CARE EDUCATION/TRAINING PROGRAM

## 2021-07-02 PROCEDURE — 250N000011 HC RX IP 250 OP 636: Performed by: STUDENT IN AN ORGANIZED HEALTH CARE EDUCATION/TRAINING PROGRAM

## 2021-07-02 PROCEDURE — 36592 COLLECT BLOOD FROM PICC: CPT | Performed by: STUDENT IN AN ORGANIZED HEALTH CARE EDUCATION/TRAINING PROGRAM

## 2021-07-02 PROCEDURE — 89051 BODY FLUID CELL COUNT: CPT | Performed by: INTERNAL MEDICINE

## 2021-07-02 PROCEDURE — 90937 HEMODIALYSIS REPEATED EVAL: CPT

## 2021-07-02 PROCEDURE — 97530 THERAPEUTIC ACTIVITIES: CPT | Mod: GP | Performed by: STUDENT IN AN ORGANIZED HEALTH CARE EDUCATION/TRAINING PROGRAM

## 2021-07-02 PROCEDURE — 258N000003 HC RX IP 258 OP 636

## 2021-07-02 PROCEDURE — 36592 COLLECT BLOOD FROM PICC: CPT | Performed by: INTERNAL MEDICINE

## 2021-07-02 PROCEDURE — 80053 COMPREHEN METABOLIC PANEL: CPT | Performed by: STUDENT IN AN ORGANIZED HEALTH CARE EDUCATION/TRAINING PROGRAM

## 2021-07-02 PROCEDURE — C9113 INJ PANTOPRAZOLE SODIUM, VIA: HCPCS | Performed by: STUDENT IN AN ORGANIZED HEALTH CARE EDUCATION/TRAINING PROGRAM

## 2021-07-02 PROCEDURE — 87070 CULTURE OTHR SPECIMN AEROBIC: CPT | Performed by: INTERNAL MEDICINE

## 2021-07-02 PROCEDURE — 87040 BLOOD CULTURE FOR BACTERIA: CPT | Performed by: STUDENT IN AN ORGANIZED HEALTH CARE EDUCATION/TRAINING PROGRAM

## 2021-07-02 PROCEDURE — 82042 OTHER SOURCE ALBUMIN QUAN EA: CPT | Performed by: INTERNAL MEDICINE

## 2021-07-02 PROCEDURE — 36415 COLL VENOUS BLD VENIPUNCTURE: CPT | Performed by: STUDENT IN AN ORGANIZED HEALTH CARE EDUCATION/TRAINING PROGRAM

## 2021-07-02 PROCEDURE — 120N000003 HC R&B IMCU UMMC

## 2021-07-02 PROCEDURE — 85610 PROTHROMBIN TIME: CPT | Performed by: STUDENT IN AN ORGANIZED HEALTH CARE EDUCATION/TRAINING PROGRAM

## 2021-07-02 PROCEDURE — 0W9G3ZX DRAINAGE OF PERITONEAL CAVITY, PERCUTANEOUS APPROACH, DIAGNOSTIC: ICD-10-PCS | Performed by: PEDIATRICS

## 2021-07-02 PROCEDURE — 85027 COMPLETE CBC AUTOMATED: CPT | Performed by: STUDENT IN AN ORGANIZED HEALTH CARE EDUCATION/TRAINING PROGRAM

## 2021-07-02 RX ORDER — ALBUMIN (HUMAN) 12.5 G/50ML
50 SOLUTION INTRAVENOUS DAILY
Status: COMPLETED | OUTPATIENT
Start: 2021-07-02 | End: 2021-07-04

## 2021-07-02 RX ORDER — POTASSIUM CHLORIDE 1.5 G/1.58G
20 POWDER, FOR SOLUTION ORAL 2 TIMES DAILY
Status: DISCONTINUED | OUTPATIENT
Start: 2021-07-02 | End: 2021-07-29

## 2021-07-02 RX ADMIN — LACTULOSE 20 G: 10 SOLUTION ORAL at 08:15

## 2021-07-02 RX ADMIN — SODIUM CHLORIDE 250 ML: 9 INJECTION, SOLUTION INTRAVENOUS at 13:56

## 2021-07-02 RX ADMIN — MEROPENEM 500 MG: 500 INJECTION, POWDER, FOR SOLUTION INTRAVENOUS at 20:38

## 2021-07-02 RX ADMIN — LACTULOSE 100 G: 10 SOLUTION ORAL; RECTAL at 21:00

## 2021-07-02 RX ADMIN — LACTULOSE 20 G: 10 SOLUTION ORAL at 16:48

## 2021-07-02 RX ADMIN — PANTOPRAZOLE SODIUM 40 MG: 40 INJECTION, POWDER, FOR SOLUTION INTRAVENOUS at 08:15

## 2021-07-02 RX ADMIN — RIFAXIMIN 550 MG: 550 TABLET ORAL at 08:15

## 2021-07-02 RX ADMIN — FOLIC ACID 1 MG: 1 TABLET ORAL at 08:15

## 2021-07-02 RX ADMIN — POTASSIUM CHLORIDE 20 MEQ: 1.5 POWDER, FOR SOLUTION ORAL at 08:15

## 2021-07-02 RX ADMIN — SODIUM CHLORIDE 300 ML: 9 INJECTION, SOLUTION INTRAVENOUS at 13:56

## 2021-07-02 RX ADMIN — THIAMINE HCL TAB 100 MG 100 MG: 100 TAB at 08:15

## 2021-07-02 RX ADMIN — ALBUMIN HUMAN 50 G: 0.25 SOLUTION INTRAVENOUS at 18:05

## 2021-07-02 RX ADMIN — LACTULOSE 100 G: 10 SOLUTION ORAL; RECTAL at 11:48

## 2021-07-02 ASSESSMENT — ACTIVITIES OF DAILY LIVING (ADL)
ADLS_ACUITY_SCORE: 17
ADLS_ACUITY_SCORE: 18
ADLS_ACUITY_SCORE: 17

## 2021-07-02 NOTE — PROGRESS NOTES
Transferred to: Unit 6B at 1920  Belongings: sent with pt, including cell phone and wallet  Rdz removed? NA  Central line removed? No  Chart and medications sent with patient Yes  Family notified: Yes

## 2021-07-02 NOTE — PROGRESS NOTES
HEMODIALYSIS TREATMENT NOTE    Date: 7/2/2021  Time: 6:08 PM    Data:  Pre Wt: 78.8 kg (173 lb 11.6 oz)   Desired Wt: 75.7 kg   Post Wt: 77.9 kg (171 lb 11.8 oz)  Weight change: 0.9 kg  Ultrafiltration - Post Run Net Total Removed (mL): 0 mL  Vascular Access Status: patent  Dialyzer Rinse: Streaked  Total Blood Volume Processed: 53.35 L Liters  Total Dialysis (Treatment) Time: 3 Hours    Lab:   no    Interventions/Assessment:  HD initiated via r cvc, 3hour run, 3k 3ca, 0 ufg, 300bfr/600dfr. Patient tolerated tx well, mild confusion during run. Net 0 removed.      Plan:    As per renal

## 2021-07-02 NOTE — PROVIDER NOTIFICATION
Nephrology NP concerned with change in orientation. Declined from yesterday.  Continues to be tachy with HR at 128. Refusing lactulose. Will give rectally. Primary team notified. Daniela 3 Intern Clarissa Cleveland

## 2021-07-02 NOTE — PLAN OF CARE
ST session cancelled. Attempted to see pt x2, however pt initially receiving lactulose edema and then off unit for dialysis. Will follow up as appropriate.

## 2021-07-02 NOTE — PROGRESS NOTES
Nephrology Progress Note  07/02/2021     Dax Villareal is a 30 year old male with h/o ETOH hepatitis, end stage liver disease, RADHA on HD, transferred from [facility] in [place] for septic shock on 6/28/21 for treatment of necrotizing pancreatitis and decompensated liver disease. He was initially admitted to [facility] 5/19/21.       Assessment & Recommendations:     1 RADHA - Remains dialysis dependent/anuric.   Last HD prior to hospital admission was 6/26/21. He has not had any OP HD at this time. Has been receiving inpatient HD at [facility] prior to transfer to [facility] on 6/28/21   - Etiology of RADHA felt to be JORGE L, abx, pancreatitis, sepsis, HRS   - Has Right TDC   - Will continue MWF schedule   - Had contrast 6/29 for evaluation of his pancreatitis.    - CRRT/HD consent obtained from sister Noni per phone   - TB is 21.0 which would also contribute to bilirubin toxicity   - Monitor for any recovery with daily chemistry labs/strict I/O and daily weights     2. Volume status - Hypervolemia with hypoxia/ascites, on supplemental oxygen. Has bilateral small pleural effusions at lung bases on CT 6/29.   Weight will be unreliable given large ascites. SBP ~ 100/ Intake 920 ml/Output: UO none, Stool 1325 ml, Drain 475 ml, for net fluid loss of 0.8 Kg    - Strict I/O please     3. CV - Shock 2/2 Sepsis: Echo on 6/10/21 showed EF of > 70% w/WMA. Off pressor support. SBP ~ 100/   - Per ICU     4. ETOH hepatitis/ recurrent ascites, HE - Patient with heavy ETOH use. Last ETOH unknown. Currently on Lactulose/Rifax, thiamine, Folate. T bili 21.0   - Getting rectal lactulose following my observation this morning   - Per GI     5. Electrolytes - Hypokalemia with stool output. K 3.3, Na 137.    - Will be corrected on HD    - Started on KCL 20 meq bid by primary team. Would not recommend scheduled KCL. OK to use prn dosing on off dialysis days. His K will be corrected on HD     6.  Acid base - No acute concerns. Bicarb 20     7. BMD - Ca 8.2,  "Phos 5.3, albumin 2.4   - Will correct Phos on HD     8. Antimicrobials - Meropenem. Off Vanco. BC NTD.   WBC 38.7, Afebrile    Recommendations were communicated to primary team via progress note    Seen and discussed with Dr. Connor Toledo, NP   [phone]    Interval History :   Nursing and provider notes from last 24 hours reviewed.  Alerted bedside nurse to significant increase in patient's encephalopathy from yesterday. Apparently he has been refusing his Lactulose  Scheduled for routine HD today   Remains anuric    Review of Systems:   I reviewed the following systems:  Unreliable given encephalopathy    Physical Exam:   I/O last 3 completed shifts:  In: 805 [P.O.:740; I.V.:35; Other:30]  Out: 1350 [Drains:375; Stool:975]   BP 97/53 (BP Location: Right arm)   Pulse 120   Temp 98  F (36.7  C)   Resp 24   Ht 1.676 m (5' 6\")   Wt 75.7 kg (166 lb 14.2 oz)   SpO2 93%   BMI 26.94 kg/m       GENERAL APPEARANCE: Acutely agitated, raising his voice, anxious, much more encephalopathic  EYES:  scleral icterus, pupils equal  Pulmonary: lungs clear to auscultation. On supplemental oxygen per mask   CV: tachy   - Edema - no LE edema  GI: large distended, tender. Has rectal tube and retroperitoneal drain  MS: no evidence of inflammation in joints, no muscle tenderness  SKIN: no rash, warm, dry, no cyanosis  NEURO: increased encephalopathy  ACCESS: Right TDC     Labs:   All labs reviewed by me  Electrolytes/Renal -   Recent Labs   Lab Test 07/02/21  0505 07/01/21  0430 07/01/21  0014 06/30/21  0445 06/29/21  0410 05/18/21  1220 05/18/21  1220    139  --  138 138   < > 127*   POTASSIUM 3.3* 3.7 3.7 3.1* 3.3*   < > 2.0*   CHLORIDE 107 107  --  104 103   < > 70*   CO2 20 22  --  22 25   < > 42*   BUN 35* 23  --  34* 20   < > 10   CR 3.28* 2.32*  --  3.02* 2.25*   < > 0.82   * 106*  --  130* 112*   < > 89   JANENE 8.2* 8.8  --  8.4* 7.9*   < > 13.4*   MAG  --   --   --  2.4* 2.0  --  1.6   PHOS  --   " --   --  5.3* 2.3*  --   --     < > = values in this interval not displayed.       CBC -   Recent Labs   Lab Test 07/02/21  0505 07/01/21 0430 06/30/21 0445   WBC 38.7* 44.7* 48.0*   HGB 7.0* 7.4* 7.7*    153 150       LFTs -   Recent Labs   Lab Test 07/02/21  0505 07/01/21 0430 06/30/21 0445   ALKPHOS 144 123 105   BILITOTAL 21.0* 20.7* 21.1*   ALT 26 27 24   AST 80* 70* 55*   PROTTOTAL 6.8 6.9 7.0   ALBUMIN 2.4* 2.6* 2.8*       Iron Panel - No lab results found.      Imaging:      Current Medications:    folic acid  1 mg Oral Daily    Or     folic acid  1 mg Intravenous Daily     gelatin absorbable  1 each Topical During Hemodialysis (from stock)     lactulose  20 g Oral or NG Tube TID     melatonin  6 mg Oral At Bedtime     meropenem  500 mg Intravenous Q24H     - MEDICATION INSTRUCTIONS -   Does not apply Once     pantoprazole (PROTONIX) IV  40 mg Intravenous Daily with breakfast     potassium chloride  20 mEq Oral BID     rifaximin  550 mg Oral BID     sodium chloride (PF)  10 mL Irrigation Q8H     sodium chloride (PF)  9 mL Intracatheter During Hemodialysis (from stock)     sodium chloride (PF)  9 mL Intracatheter During Hemodialysis (from stock)     vitamin B1  100 mg Oral Daily    Or     thiamine  100 mg Intravenous Daily       - MEDICATION INSTRUCTIONS -       Tory Toledo, NP

## 2021-07-02 NOTE — CONSULTS
Consult and Procedure Service - Procedure Note    Attending: Gaurav Russell MD   Indication: Evaluation for SBP  Risk Assessment: Average  Pre-procedure diagnosis: Ascites  Post-procedure diagnosis: Ascites  Procedure name: Diagnostic paracentesis    The risks and benefits of the procedure were explained to the patient who expressed understanding and opted to proceed.  Consent was obtained and placed in the chart.  A time out was performed.  An area of ascites was located with ultrasound and marked in the left lower quadrant; the area was prepped and draped in the usual sterile fashion.  5 ml of 1% lidocaine was instilled with a 22 gauge needle and ascites located under real-time guidance. 10 ml of cloudy red colored fluid was removed and sent for analysis and the area dressed.     Patient tolerated the procedure well. Please contact the Consult and Procedure Service if any complications or concerns arise.     Gaurav Russell MD   DOS:  07/02/21

## 2021-07-02 NOTE — PROGRESS NOTES
Resident/Fellow Attestation   I, Eris Miles, was present with the medical/NAHUM student who participated in the service and in the documentation of the note.  I have verified the history and personally performed the physical exam and medical decision making.  I agree with the assessment and plan of care as documented in the note.      Eris Miles MD  PGY2  Date of Service (when I saw the patient): 07/02/21    [facility]    Progress Note - Maroon 3 Service        Date of Admission:  6/28/2021    Assessment & Plan      Dax Villareal is a 30 year old male with hx of alcohol use disorder admitted on 6/28/2021 after recent prolonged admission at [facility] for acute alcoholic hepatitis and ESLD c/b hepatic encephalopathy, ARF requiring hemodialysis, acute respiratory failure and septic shock in the setting of acute necrotizing pancreatitis. His acute respiratory failure and septic shock have improved, but he requires continued management for ARF, hepatic encephalopathy, and new diagnosis of SBP.     Changes today:  - Diagnostic Para, SBP  - Continue on Meropenem  - Albumin 25% 50 g x3 days in setting of SBP and ARF on HD  - Continue with Lactulose TID & PRN - stool goal output 1.5 L     #Necrotizing pancreatitis w/ pseudocyst, improving  #SBP  #Profound leukocytosis, improving  Etiology of the septic shock that he came in with likely due to necrotizing pancreatitis +/- SBP. Had percutaneous drain placed for drainage of pancreatic collection. Diagnostic para returned with SBP. GI/surgery conference 7/1 recommended no endoscopic transluminal drainage for now.    - Albumin 25% 50 g x3 days   - Continue meropenem 500 mg q24h  - Follow up cultures   - Daily CBC  - MAP goal > 65  - GI consulted, recs appreciated     > If patient clinically decompensates, obtain repeat CT scan     > Plan for repeat CT on Sunday, July 4     > Repeat diagnostic para in 3  days to ensure WBC is down trending      # Decompensated EtOH cirrhosis c/b hepatic encephalopathy and recurrent ascites  # Hx of Alcohol use disorder c/b withdrawal   # Anemia  # Coagulopathy   Patient with a history of marked alcohol use and alcoholic hepatitis in the past. Was given a course of steroids (1 week) during previous admission at OSH. Though known pancreatic collection is the likely reason for infectious presentation, para is ordered for today to rule out SBP due to pt spiking two fevers. Patient mental status worsened since yesterday and refusing lactulose, so will give rectally.     - Lactulose 20g TID and PRN and rifaximin 550mg BID (titrate to 1.5L stool/day)  - Trend MELD labs  - Daily folic acid and thiamine  - Delirium precautions  - Melatonin 6mg at bedtime  - Avoid benzos and opioids as able  - Transfuse if hemoglobin < 7, platelet < 10 or < 30 with bleeding  - Continue to monitor for signs of bleeding     MELD-Na score: 38 at 7/2/2021  5:05 AM  MELD score: 38 at 7/2/2021  5:05 AM  Calculated from:  Serum Creatinine: 3.28 mg/dL at 7/2/2021  5:05 AM  Serum Sodium: 137 mmol/L at 7/2/2021  5:05 AM  Total Bilirubin: 21.0 mg/dL at 7/2/2021  5:05 AM  INR(ratio): 2.24 at 7/2/2021  5:05 AM  Age: 30 years 8 months     # ARF on HD (MWF)  # Hypervolemia causing acute respiratory failure, improving  - Nephrology consulted, recs appreciated     > Hemodialysis MWF              - Midodrine 10 mg PRN before dialysis sessions     > Daily chemistry labs, strict I/O, and daily weights  - Aspiration precautions  - Supplemental oxygen as needed to keep sats above 89%     # Severe malnutrition in setting of chronic illness  - Dysphagia level 3 diet with thin liquids  - No plans to replace NJ  - Continue multivitamins  - Pantoprazole 40 mg IV daily  - Zofran PRN     Diet: Snacks/Supplements Adult: Ensure Clear; With Meals  Dysphagia Diet Level 3 Advanced Thin Liquids (water, ice chips, juice, milk gelatin, ice  cream, etc)    DVT Prophylaxis: Pneumatic Compression Devices  Rdz Catheter: Not present  Fluids: 5 mL TKO  Central Lines: PRESENT  PICC Triple Lumen 06/28/21 Left-Site Assessment: WDL  CVC Double Lumen Right-Site Assessment: WDL  Code Status: Full Code      Disposition Plan   Expected discharge: > 7 days, recommended to transitional care unit once mental status at baseline, renal function improved and necrotizing pancreatitis is well managed .     The patient's care was discussed with the Attending Physician, Dr. Gonzales.    Jaime Amin, MS3  Medical Student  [address]  Securely message with the Voc[employer] Web Console (learn more here)  Text page via [employer] Paging/Directory  Please see sign in/sign out for up to date coverage information  ______________________________________________________________________    Interval History   Per nursing notes, patient consistently had oxygen saturations in the low 90s on RA. Tachycardia increased from yesterday. Patient's neuro status declined since yesterday; he is oriented x1 to self. Some PO intake, mostly liquid. Patient seen and evaluated at 11:30 am. Was not able to respond to most questions and appeared uncomfortable. Not oriented to place or self.     Data reviewed today: I reviewed all medications, new labs and imaging results over the last 24 hours. I personally reviewed the EKG tracing showing supraventricular tachycardia, no changes from previous EKG.    Physical Exam   Vital Signs: Temp: 100.9  F (38.3  C) Temp src: Oral BP: 100/49 Pulse: 131   Resp: 28 SpO2: 91 % O2 Device: None (Room air)    Weight: 166 lbs 14.21 oz    Gen: Sitting up in chair, appears uncomfortable.   HEENT: Conjunctival icterus  CV: Tachycardic. Peripheral perfusion intact  Resp: Breathing comfortably on RA  Abd: Soft, distended, tender over epigastric area.   Skin: Jaundiced  Ext: Well perfused, no edema  Mental status/Psych:  Sitting up in chair, not oriented to place nor self but responds occasionally when examining stomach to indicate where it hurts. Does not make eye contact, sometimes hard to understand his words.     Data   Recent Labs   Lab 07/02/21  0505 07/01/21  0430 07/01/21  0014 06/30/21  0700 06/30/21  0445 06/28/21  1839 06/28/21  1839   WBC 38.7* 44.7*  --   --  48.0*   < > 50.6*   HGB 7.0* 7.4*  --   --  7.7*   < > 7.6*   * 100  --   --  100   < > 101*    153  --   --  150   < > 127*   INR 2.24* 2.19*  --  2.13* 2.22*   < > 3.76*    139  --   --  138   < > 134   POTASSIUM 3.3* 3.7 3.7  --  3.1*   < > 3.4   CHLORIDE 107 107  --   --  104   < > 101   CO2 20 22  --   --  22   < > 27   BUN 35* 23  --   --  34*   < > 14   CR 3.28* 2.32*  --   --  3.02*   < > 1.78*   ANIONGAP 10 10  --   --  14   < > 6   JANENE 8.2* 8.8  --   --  8.4*   < > 8.2*   * 106*  --   --  130*   < > 119*   ALBUMIN 2.4* 2.6*  --   --  2.8*   < > 3.0*   PROTTOTAL 6.8 6.9  --   --  7.0   < > 6.8   BILITOTAL 21.0* 20.7*  --   --  21.1*   < > 21.3*   ALKPHOS 144 123  --   --  105   < > 90   ALT 26 27  --   --  24   < > 23   AST 80* 70*  --   --  55*   < > 74*   LIPASE  --   --   --   --   --   --  199    < > = values in this interval not displayed.     No results found for this or any previous visit (from the past 24 hour(s)).

## 2021-07-03 ENCOUNTER — APPOINTMENT (OUTPATIENT)
Dept: SPEECH THERAPY | Facility: CLINIC | Age: 31
End: 2021-07-03
Attending: INTERNAL MEDICINE
Payer: MEDICAID

## 2021-07-03 LAB
ABO + RH BLD: NORMAL
ABO + RH BLD: NORMAL
ALBUMIN SERPL-MCNC: 2.9 G/DL (ref 3.4–5)
ALP SERPL-CCNC: 151 U/L (ref 40–150)
ALT SERPL W P-5'-P-CCNC: 23 U/L (ref 0–70)
ANION GAP SERPL CALCULATED.3IONS-SCNC: 10 MMOL/L (ref 3–14)
AST SERPL W P-5'-P-CCNC: 83 U/L (ref 0–45)
BILIRUB SERPL-MCNC: 22.4 MG/DL (ref 0.2–1.3)
BLD GP AB SCN SERPL QL: NORMAL
BLD PROD TYP BPU: NORMAL
BLD PROD TYP BPU: NORMAL
BLD UNIT ID BPU: 0
BLOOD BANK CMNT PATIENT-IMP: NORMAL
BLOOD PRODUCT CODE: NORMAL
BPU ID: NORMAL
BUN SERPL-MCNC: 23 MG/DL (ref 7–30)
CALCIUM SERPL-MCNC: 8.4 MG/DL (ref 8.5–10.1)
CHLORIDE SERPL-SCNC: 104 MMOL/L (ref 94–109)
CO2 SERPL-SCNC: 23 MMOL/L (ref 20–32)
CREAT SERPL-MCNC: 2.66 MG/DL (ref 0.66–1.25)
ERYTHROCYTE [DISTWIDTH] IN BLOOD BY AUTOMATED COUNT: ABNORMAL % (ref 10–15)
GFR SERPL CREATININE-BSD FRML MDRD: 30 ML/MIN/{1.73_M2}
GLUCOSE SERPL-MCNC: 94 MG/DL (ref 70–99)
HCT VFR BLD AUTO: 21.1 % (ref 40–53)
HGB BLD-MCNC: 6.7 G/DL (ref 13.3–17.7)
HGB BLD-MCNC: 7.6 G/DL (ref 13.3–17.7)
INR PPP: 2.08 (ref 0.86–1.14)
LACTATE BLD-SCNC: 1.8 MMOL/L (ref 0.7–2)
MCH RBC QN AUTO: 32.7 PG (ref 26.5–33)
MCHC RBC AUTO-ENTMCNC: 31.8 G/DL (ref 31.5–36.5)
MCV RBC AUTO: 103 FL (ref 78–100)
NUM BPU REQUESTED: 1
PLATELET # BLD AUTO: 147 10E9/L (ref 150–450)
POTASSIUM SERPL-SCNC: 3.3 MMOL/L (ref 3.4–5.3)
PROT SERPL-MCNC: 6.9 G/DL (ref 6.8–8.8)
RBC # BLD AUTO: 2.05 10E12/L (ref 4.4–5.9)
SODIUM SERPL-SCNC: 137 MMOL/L (ref 133–144)
SPECIMEN EXP DATE BLD: NORMAL
TRANSFUSION STATUS PATIENT QL: NORMAL
TRANSFUSION STATUS PATIENT QL: NORMAL
WBC # BLD AUTO: 35.2 10E9/L (ref 4–11)

## 2021-07-03 PROCEDURE — 85027 COMPLETE CBC AUTOMATED: CPT | Performed by: STUDENT IN AN ORGANIZED HEALTH CARE EDUCATION/TRAINING PROGRAM

## 2021-07-03 PROCEDURE — 36592 COLLECT BLOOD FROM PICC: CPT | Performed by: STUDENT IN AN ORGANIZED HEALTH CARE EDUCATION/TRAINING PROGRAM

## 2021-07-03 PROCEDURE — 250N000011 HC RX IP 250 OP 636: Performed by: NURSE PRACTITIONER

## 2021-07-03 PROCEDURE — 92526 ORAL FUNCTION THERAPY: CPT | Mod: GN

## 2021-07-03 PROCEDURE — 36415 COLL VENOUS BLD VENIPUNCTURE: CPT | Performed by: INTERNAL MEDICINE

## 2021-07-03 PROCEDURE — 250N000013 HC RX MED GY IP 250 OP 250 PS 637: Performed by: STUDENT IN AN ORGANIZED HEALTH CARE EDUCATION/TRAINING PROGRAM

## 2021-07-03 PROCEDURE — 86901 BLOOD TYPING SEROLOGIC RH(D): CPT | Performed by: INTERNAL MEDICINE

## 2021-07-03 PROCEDURE — 85610 PROTHROMBIN TIME: CPT | Performed by: STUDENT IN AN ORGANIZED HEALTH CARE EDUCATION/TRAINING PROGRAM

## 2021-07-03 PROCEDURE — 250N000011 HC RX IP 250 OP 636: Performed by: STUDENT IN AN ORGANIZED HEALTH CARE EDUCATION/TRAINING PROGRAM

## 2021-07-03 PROCEDURE — 86900 BLOOD TYPING SEROLOGIC ABO: CPT | Performed by: INTERNAL MEDICINE

## 2021-07-03 PROCEDURE — 120N000003 HC R&B IMCU UMMC

## 2021-07-03 PROCEDURE — 86923 COMPATIBILITY TEST ELECTRIC: CPT | Performed by: INTERNAL MEDICINE

## 2021-07-03 PROCEDURE — C9113 INJ PANTOPRAZOLE SODIUM, VIA: HCPCS | Performed by: STUDENT IN AN ORGANIZED HEALTH CARE EDUCATION/TRAINING PROGRAM

## 2021-07-03 PROCEDURE — 83605 ASSAY OF LACTIC ACID: CPT | Performed by: INTERNAL MEDICINE

## 2021-07-03 PROCEDURE — 99233 SBSQ HOSP IP/OBS HIGH 50: CPT | Mod: GC | Performed by: INTERNAL MEDICINE

## 2021-07-03 PROCEDURE — P9047 ALBUMIN (HUMAN), 25%, 50ML: HCPCS | Performed by: STUDENT IN AN ORGANIZED HEALTH CARE EDUCATION/TRAINING PROGRAM

## 2021-07-03 PROCEDURE — 80053 COMPREHEN METABOLIC PANEL: CPT | Performed by: STUDENT IN AN ORGANIZED HEALTH CARE EDUCATION/TRAINING PROGRAM

## 2021-07-03 PROCEDURE — P9016 RBC LEUKOCYTES REDUCED: HCPCS | Performed by: INTERNAL MEDICINE

## 2021-07-03 PROCEDURE — 85018 HEMOGLOBIN: CPT | Performed by: STUDENT IN AN ORGANIZED HEALTH CARE EDUCATION/TRAINING PROGRAM

## 2021-07-03 PROCEDURE — 99233 SBSQ HOSP IP/OBS HIGH 50: CPT | Performed by: INTERNAL MEDICINE

## 2021-07-03 PROCEDURE — 86850 RBC ANTIBODY SCREEN: CPT | Performed by: INTERNAL MEDICINE

## 2021-07-03 RX ORDER — PANTOPRAZOLE SODIUM 40 MG/1
40 TABLET, DELAYED RELEASE ORAL
Status: DISCONTINUED | OUTPATIENT
Start: 2021-07-04 | End: 2021-07-05

## 2021-07-03 RX ADMIN — LACTULOSE 20 G: 10 SOLUTION ORAL; RECTAL at 14:20

## 2021-07-03 RX ADMIN — POTASSIUM CHLORIDE 20 MEQ: 1.5 POWDER, FOR SOLUTION ORAL at 09:26

## 2021-07-03 RX ADMIN — LACTULOSE 20 G: 10 SOLUTION ORAL at 21:02

## 2021-07-03 RX ADMIN — FOLIC ACID 1 MG: 1 TABLET ORAL at 09:27

## 2021-07-03 RX ADMIN — ONDANSETRON 4 MG: 2 INJECTION INTRAMUSCULAR; INTRAVENOUS at 21:43

## 2021-07-03 RX ADMIN — THIAMINE HCL TAB 100 MG 100 MG: 100 TAB at 09:26

## 2021-07-03 RX ADMIN — RIFAXIMIN 550 MG: 550 TABLET ORAL at 09:27

## 2021-07-03 RX ADMIN — LACTULOSE 20 G: 10 SOLUTION ORAL at 14:19

## 2021-07-03 RX ADMIN — MELATONIN TAB 3 MG 6 MG: 3 TAB at 22:14

## 2021-07-03 RX ADMIN — LACTULOSE 20 G: 10 SOLUTION ORAL; RECTAL at 04:55

## 2021-07-03 RX ADMIN — LACTULOSE 20 G: 10 SOLUTION ORAL; RECTAL at 09:33

## 2021-07-03 RX ADMIN — RIFAXIMIN 550 MG: 550 TABLET ORAL at 21:02

## 2021-07-03 RX ADMIN — PANTOPRAZOLE SODIUM 40 MG: 40 INJECTION, POWDER, FOR SOLUTION INTRAVENOUS at 09:26

## 2021-07-03 RX ADMIN — LACTULOSE 20 G: 10 SOLUTION ORAL; RECTAL at 12:46

## 2021-07-03 RX ADMIN — LACTULOSE 20 G: 10 SOLUTION ORAL; RECTAL at 16:37

## 2021-07-03 RX ADMIN — MEROPENEM 500 MG: 500 INJECTION, POWDER, FOR SOLUTION INTRAVENOUS at 21:03

## 2021-07-03 RX ADMIN — LACTULOSE 20 G: 10 SOLUTION ORAL at 09:31

## 2021-07-03 RX ADMIN — LACTULOSE 100 G: 10 SOLUTION ORAL; RECTAL at 00:36

## 2021-07-03 RX ADMIN — LACTULOSE 20 G: 10 SOLUTION ORAL; RECTAL at 06:31

## 2021-07-03 RX ADMIN — LACTULOSE 20 G: 10 SOLUTION ORAL; RECTAL at 02:19

## 2021-07-03 RX ADMIN — POTASSIUM CHLORIDE 20 MEQ: 1.5 POWDER, FOR SOLUTION ORAL at 21:02

## 2021-07-03 RX ADMIN — ALBUMIN HUMAN 50 G: 0.25 SOLUTION INTRAVENOUS at 09:25

## 2021-07-03 ASSESSMENT — ACTIVITIES OF DAILY LIVING (ADL)
ADLS_ACUITY_SCORE: 20
ADLS_ACUITY_SCORE: 22
ADLS_ACUITY_SCORE: 20
ADLS_ACUITY_SCORE: 20
ADLS_ACUITY_SCORE: 17
ADLS_ACUITY_SCORE: 20

## 2021-07-03 ASSESSMENT — MIFFLIN-ST. JEOR: SCORE: 1677.94

## 2021-07-03 NOTE — PLAN OF CARE
Assumed care from to.    Vitals:    07/02/21 1930 07/03/21 0014 07/03/21 0400 07/03/21 0445   BP: 92/51 111/50     BP Location: Right arm Right arm     Pulse: 124 115  112   Resp: 20 20     Temp: 98.6  F (37  C) 98.2  F (36.8  C)     TempSrc: Axillary Oral     SpO2: 92% 93%  94%   Weight:   78 kg (172 lb)    Height:            Vitals: VSS. Patient denied 0400 vitals.   Pain: Denying pain when awake. No nonverbal indicators of pain when sleeping.   Cardiovascular: Sinus tachycardia with HR in 110s-120s.   Neuro: Disoriented to time, place, and situation. PRN lactulose given q2hr for stage II [place] scoring. At 0000, pt endorsed anxiety. See provider notification note regarding anxiety management. At 0400, patient grew agitated and frustrated. Made innapropriate comments to staff and refused vitals. Patient did eventually agree to take PRN lactulose and fell back asleep.   Respiratory: Sating > 92% on RA.   GI: Rectal tube fell out x2 and was replaced. Stool is watery and brown/green. One dose of PRN lactulose given rectally. Patient tolerated oral doses otherwise.   : Anuric. HD MWF.   Activity: Bedrest. Repositioning independently.   Skin: No new deficits noted. Foam dressing placed on left thigh to protect skin from panc drain.   LDAs: PICC is saline locked. CVC is used for HD. Pancreatitis drain has moderate dark red output.     Plan: Continue with POC. Notify primary team with changes.

## 2021-07-03 NOTE — PROVIDER NOTIFICATION
Time of notification: 3:09 AM  Provider notified: Daniela night float intern/resident #[id number]  Patient status: Pt is endorsing significant anxiety and has no PRN/scheduled medications to treat anxiety. VSS ex sinus tachycardia.  Temp:  [97.8  F (36.6  C)-100.9  F (38.3  C)] 98.2  F (36.8  C)  Pulse:  [115-131] 115  Resp:  [16-28] 20  BP: ()/(49-69) 111/50  SpO2:  [84 %-95 %] 93 %  Orders received: Provider did not want to prescribe any medication that could alter mental status. Will plan on using non-pharm techniques to help patient relax. No new orders.

## 2021-07-03 NOTE — PROGRESS NOTES
Nephrology Progress Note  07/03/2021     Dax Villareal is a 30 year old male with h/o ETOH hepatitis, end stage liver disease, RADHA on HD, transferred from [facility] in [place] for septic shock on 6/28/21 for treatment of necrotizing pancreatitis and decompensated liver disease. He was initially admitted to [facility] 5/19/21.       Assessment & Recommendations:     1 RADHA - Remains dialysis dependent/anuric.   Last HD prior to hospital admission was 6/26/21. He has not had any OP HD at this time. Has been receiving inpatient HD at [facility] prior to transfer to [facility] on 6/28/21   - Etiology of RADHA felt to be JORGE L, abx, pancreatitis, sepsis, HRS   - Has Right TDC   - Will continue MWF schedule.  No indication for HD today   - Had contrast 6/29 for evaluation of his pancreatitis.    - CRRT/HD consent obtained from sister Noni per phone     2. Volume status - Hypervolemia with hypoxia/ascites, on supplemental oxygen. Has bilateral small pleural effusions at lung bases on CT 6/29.   Weight will be unreliable given large ascites. SBP ~ 100/ Intake 920 ml/Output: UO none, Stool 1325 ml, Drain 475 ml, for net fluid loss of 0.8 Kg    - Strict I/O please     3. ETOH hepatitis/ recurrent ascites, HE - Patient with heavy ETOH use. Last ETOH unknown. Currently on Lactulose/Rifax, thiamine, Folate. T bili 21.0   - Getting rectal lactulose following my observation this morning   - Per GI     4. Electrolytes - Hypokalemia with stool output. K 3.3, Na 137.    - continue oral replacement prn KCL 20 meq      Recommendations were communicated to primary team via progress note    Ladarius eRid MD  Division of Nephrology and Hypertension  Pager: [phone]  July 3, 2021  7:33 PM      Interval History : July 3, 2021  Patient seen and examined this am  Nursing and provider notes from last 24 hours reviewed.  Patient alert, but confused. Speech is slurred,    No urine output recorded  Received HD 7/2/21    Review of Systems:   I reviewed the  "following systems:  Unreliable given encephalopathy    Physical Exam:   I/O last 3 completed shifts:  In: 70 [P.O.:50; I.V.:10; Other:10]  Out: 1450 [Drains:350; Stool:1100]   BP 99/58 (BP Location: Right arm)   Pulse 118   Temp 98.4  F (36.9  C) (Oral)   Resp 22   Ht 1.676 m (5' 6\")   Wt 78 kg (172 lb)   SpO2 94%   BMI 27.76 kg/m       GENERAL APPEARANCE: encephalopathic but calm  EYES:  scleral icterus, pupils equal  Pulmonary: lungs clear to auscultation.    CV: tachy.  No rub heard   - Edema - no LE edema  GI: large distended,   MS: no evidence of inflammation in joints, no muscle tenderness  SKIN: no rash, warm, dry, no cyanosis, marked jaundice  NEURO: increased encephalopathy.  asterixis  ACCESS: Right TDC     Labs:   All labs reviewed by me  Electrolytes/Renal -   Recent Labs   Lab Test 07/03/21  0603 07/02/21  0505 07/01/21  0430 06/30/21  0445 06/30/21  0445 06/29/21  0410 05/18/21  1220 05/18/21  1220    137 139  --  138 138   < > 127*   POTASSIUM 3.3* 3.3* 3.7   < > 3.1* 3.3*   < > 2.0*   CHLORIDE 104 107 107  --  104 103   < > 70*   CO2 23 20 22  --  22 25   < > 42*   BUN 23 35* 23  --  34* 20   < > 10   CR 2.66* 3.28* 2.32*  --  3.02* 2.25*   < > 0.82   GLC 94 113* 106*  --  130* 112*   < > 89   JANENE 8.4* 8.2* 8.8  --  8.4* 7.9*   < > 13.4*   MAG  --   --   --   --  2.4* 2.0  --  1.6   PHOS  --   --   --   --  5.3* 2.3*  --   --     < > = values in this interval not displayed.       CBC -   Recent Labs   Lab Test 07/03/21  0603 07/02/21  0505 07/01/21  0430   WBC 35.2* 38.7* 44.7*   HGB 6.7* 7.0* 7.4*   * 184 153       LFTs -   Recent Labs   Lab Test 07/03/21  0603 07/02/21  0505 07/01/21  0430   ALKPHOS 151* 144 123   BILITOTAL 22.4* 21.0* 20.7*   ALT 23 26 27   AST 83* 80* 70*   PROTTOTAL 6.9 6.8 6.9   ALBUMIN 2.9* 2.4* 2.6*       Iron Panel - No lab results found.      Imaging:      Current Medications:    albumin human  50 g Intravenous Daily     folic acid  1 mg Oral Daily    Or "     folic acid  1 mg Intravenous Daily     lactulose  20 g Oral or NG Tube TID     melatonin  6 mg Oral At Bedtime     meropenem  500 mg Intravenous Q24H     pantoprazole (PROTONIX) IV  40 mg Intravenous Daily with breakfast     potassium chloride  20 mEq Oral BID     rifaximin  550 mg Oral BID     sodium chloride (PF)  10 mL Irrigation Q8H     vitamin B1  100 mg Oral Daily    Or     thiamine  100 mg Intravenous Daily       - MEDICATION INSTRUCTIONS -       Ladarius Reid MD

## 2021-07-03 NOTE — PROVIDER NOTIFICATION
-------------------CRITICAL LAB VALUE-------------------    Lab Value: Hgb: 6.7  Time of notification: 7:12 AM  MD notified: Daniela Logan intern #[id number]  Patient status: Patient is vitally stable, but confused and mildly agitated. Receiving lactulose q2hr PRN for stage II [place] scoring.   Temp:  [98  F (36.7  C)-100.9  F (38.3  C)] 98.2  F (36.8  C)  Pulse:  [112-131] 112  Resp:  [20-28] 20  BP: ()/(49-69) 111/50  SpO2:  [84 %-95 %] 94 %  Orders received: Still awaiting provider call.

## 2021-07-03 NOTE — PROGRESS NOTES
[facility]    Progress Note - Maroon 3 Service        Date of Admission:  6/28/2021    Assessment & Plan      Dax Villareal is a 30 year old male with hx of alcohol use disorder admitted on 6/28/2021 after recent prolonged admission at [facility] for acute alcoholic hepatitis and ESLD c/b hepatic encephalopathy, ARF requiring hemodialysis, acute respiratory failure and septic shock in the setting of acute necrotizing pancreatitis. His acute respiratory failure and septic shock have improved, but he requires continued management for ARF, hepatic encephalopathy, and new diagnosis of SBP.     Changes today:  - 1u pRBC   - Continue on Meropenem  - Continue with Lactulose TID & PRN - stool goal output 1.5 L     #Necrotizing pancreatitis w/ pseudocyst, improving  #SBP  #Profound leukocytosis, improving  Etiology of the septic shock that he came in with likely due to necrotizing pancreatitis +/- SBP. Had percutaneous drain placed for drainage of pancreatic collection. Diagnostic para returned with SBP, likely 2/2 pancreatitis (SAAG 0.4) but clouded by underlying liver disease. GI/surgery conference 7/1 recommended no endoscopic transluminal drainage for now.    - Albumin 25% 50 g x3 days   - Continue meropenem 500 mg q24h  - Follow up cultures   - Daily CBC  - MAP goal > 65  - GI consulted, recs appreciated     > If patient clinically decompensates, obtain repeat CT scan     > Plan for repeat CT on Sunday, July 4     > Repeat diagnostic para in 3 days to ensure WBC is down trending      # Decompensated EtOH cirrhosis c/b hepatic encephalopathy and recurrent ascites  # Hx of Alcohol use disorder c/b withdrawal   # Anemia  # Coagulopathy   Patient with a history of marked alcohol use and alcoholic hepatitis in the past. Was given a course of steroids (1 week) during previous admission at OSH. Patient mental status somewhat improved since yesterday.     - Lactulose 20g  TID and PRN and rifaximin 550mg BID (titrate to 1.5L stool/day)  - s/p 1 unit(s) pRBC 7/3  - Trend MELD labs  - Daily folic acid and thiamine  - Delirium precautions  - Melatonin 6mg at bedtime  - Avoid benzos and opioids as able  - Transfuse if hemoglobin < 7, platelet < 10 or < 30 with bleeding  - Continue to monitor for signs of bleeding     MELD-Na score: 36 at 7/3/2021  6:03 AM  MELD score: 36 at 7/3/2021  6:03 AM  Calculated from:  Serum Creatinine: 2.66 mg/dL at 7/3/2021  6:03 AM  Serum Sodium: 137 mmol/L at 7/3/2021  6:03 AM  Total Bilirubin: 22.4 mg/dL at 7/3/2021  6:03 AM  INR(ratio): 2.08 at 7/3/2021  6:03 AM  Age: 30 years 8 months     # ARF on HD (MWF)  # Hypervolemia causing acute respiratory failure, improving  - Nephrology consulted, recs appreciated     > Hemodialysis MWF              - Midodrine 10 mg PRN before dialysis sessions     > Daily chemistry labs, strict I/O, and daily weights  - Aspiration precautions  - Supplemental oxygen as needed to keep sats above 89%     # Severe malnutrition in setting of chronic illness  - Dysphagia level 3 diet with thin liquids  - No plans to replace NJ  - Continue multivitamins  - Pantoprazole 40 mg IV daily  - Zofran PRN     Diet: Snacks/Supplements Adult: Ensure Clear; With Meals  Dysphagia Diet Level 3 Advanced Thin Liquids (water, ice chips, juice, milk gelatin, ice cream, etc)    DVT Prophylaxis: Pneumatic Compression Devices  Rdz Catheter: Not present  Fluids: 5 mL TKO  Central Lines: PRESENT  PICC Triple Lumen 06/28/21 Left-Site Assessment: WDL  CVC Double Lumen Right-Site Assessment: WDL  Code Status: Full Code      Disposition Plan   Expected discharge: > 7 days, recommended to transitional care unit once mental status at baseline, renal function improved and necrotizing pancreatitis is well managed .     The patient's care was discussed with the Attending Physician, Dr. Gonzales.    Eris Miles MD  PGY-2, Internal Medicine     [place] [id number]  [facility]  Securely message with the [employer] Web Console (learn more here)  Text page via [facility] Paging/Directory  Please see sign in/sign out for up to date coverage information  ______________________________________________________________________    Interval History   NAEO. Nursing notes reviewed. Patient's sister Noni visited yesterday afternoon. Patient no complaints of increased or worsening pain. Somewhat more lucid today compared to previous days.      Data reviewed today: I reviewed all medications, new labs and imaging results over the last 24 hours. I personally reviewed the EKG tracing showing supraventricular tachycardia, no changes from previous EKG.    Physical Exam   Vital Signs: Temp: 98.8  F (37.1  C) Temp src: Oral BP: 112/53 Pulse: 120   Resp: 20 SpO2: 91 % O2 Device: None (Room air)    Weight: 172 lbs 0 oz    Gen: Sitting up bed, NAD  HEENT: Conjunctival icterus  CV: Tachycardic. Regular rhythm.   Resp: Breathing comfortably on RA  Abd: Soft, distended, tender over epigastric area.   Skin: Jaundiced  Ext: Well perfused, no edema  Mental status/Psych: Alert and oriented to person, place, and year. Otherwise has nonsensical/cyclic speech.      Data   Recent Labs   Lab 07/03/21  0603 07/02/21  0505 07/01/21  0430 06/28/21  1839 06/28/21  1839   WBC 35.2* 38.7* 44.7*   < > 50.6*   HGB 6.7* 7.0* 7.4*   < > 7.6*   * 102* 100   < > 101*   * 184 153   < > 127*   INR 2.08* 2.24* 2.19*   < > 3.76*    137 139   < > 134   POTASSIUM 3.3* 3.3* 3.7   < > 3.4   CHLORIDE 104 107 107   < > 101   CO2 23 20 22   < > 27   BUN 23 35* 23   < > 14   CR 2.66* 3.28* 2.32*   < > 1.78*   ANIONGAP 10 10 10   < > 6   JANENE 8.4* 8.2* 8.8   < > 8.2*   GLC 94 113* 106*   < > 119*   ALBUMIN 2.9* 2.4* 2.6*   < > 3.0*   PROTTOTAL 6.9 6.8 6.9   < > 6.8   BILITOTAL 22.4* 21.0* 20.7*   < > 21.3*   ALKPHOS 151* 144 123   < > 90   ALT 23 26 27   < > 23   AST 83*  80* 70*   < > 74*   LIPASE  --   --   --   --  199    < > = values in this interval not displayed.     No results found for this or any previous visit (from the past 24 hour(s)).

## 2021-07-03 NOTE — PLAN OF CARE
"/50 (BP Location: Right arm)   Pulse 115   Temp 98.2  F (36.8  C) (Oral)   Resp 20   Ht 1.676 m (5' 6\")   Wt 75.7 kg (166 lb 14.2 oz)   SpO2 93%   BMI 26.94 kg/m       Neuro: A&O x 1  Cardiac: SR, VSS  Respiratory: RA, Lungs clear  GI/: HD, Oliguric, Rectal tube with large watery stool output. Lactulose enemas given x 2 today. Pt tolerating well.   Diet/Appetite: DD3 with thin liquids. Appetite poor. Pt took few bites of evening meal. Refused most of meal and fluids. Refused oral medications. Pt becoming agitated. Pulling at lines. Hand mitts applied to protect lines.   Skin: Excoriation and bruising to abdomen. Para site covered with bandage. Dried drainage noted.   LDA: PICC, CVC, Plural drain.   Activity: Bedrest, T&R. Pt shifting weight in bed.   Pain: REJI, Pt resting comfortably, facial features relaxed, Sleeping in between cares.   Plan: Will continue with POC.      "

## 2021-07-04 ENCOUNTER — APPOINTMENT (OUTPATIENT)
Dept: GENERAL RADIOLOGY | Facility: CLINIC | Age: 31
End: 2021-07-04
Attending: INTERNAL MEDICINE
Payer: MEDICAID

## 2021-07-04 ENCOUNTER — APPOINTMENT (OUTPATIENT)
Dept: CT IMAGING | Facility: CLINIC | Age: 31
End: 2021-07-04
Attending: INTERNAL MEDICINE
Payer: MEDICAID

## 2021-07-04 LAB
ALBUMIN SERPL-MCNC: 3.3 G/DL (ref 3.4–5)
ALP SERPL-CCNC: 169 U/L (ref 40–150)
ALT SERPL W P-5'-P-CCNC: 22 U/L (ref 0–70)
ANION GAP SERPL CALCULATED.3IONS-SCNC: 11 MMOL/L (ref 3–14)
AST SERPL W P-5'-P-CCNC: 84 U/L (ref 0–45)
BILIRUB SERPL-MCNC: 24.1 MG/DL (ref 0.2–1.3)
BUN SERPL-MCNC: 35 MG/DL (ref 7–30)
CALCIUM SERPL-MCNC: 8.6 MG/DL (ref 8.5–10.1)
CHLORIDE SERPL-SCNC: 111 MMOL/L (ref 94–109)
CO2 SERPL-SCNC: 21 MMOL/L (ref 20–32)
CREAT SERPL-MCNC: 3.43 MG/DL (ref 0.66–1.25)
ERYTHROCYTE [DISTWIDTH] IN BLOOD BY AUTOMATED COUNT: 27.6 % (ref 10–15)
GFR SERPL CREATININE-BSD FRML MDRD: 22 ML/MIN/{1.73_M2}
GLUCOSE SERPL-MCNC: 121 MG/DL (ref 70–99)
HCT VFR BLD AUTO: 24 % (ref 40–53)
HGB BLD-MCNC: 7.8 G/DL (ref 13.3–17.7)
INR PPP: 2.11 (ref 0.86–1.14)
LACTATE BLD-SCNC: 1.4 MMOL/L (ref 0.7–2)
MAGNESIUM SERPL-MCNC: 2.1 MG/DL (ref 1.6–2.3)
MCH RBC QN AUTO: 32.4 PG (ref 26.5–33)
MCHC RBC AUTO-ENTMCNC: 32.5 G/DL (ref 31.5–36.5)
MCV RBC AUTO: 100 FL (ref 78–100)
PLATELET # BLD AUTO: 184 10E9/L (ref 150–450)
POTASSIUM BLD-SCNC: 3.3 MMOL/L (ref 3.4–5.3)
POTASSIUM SERPL-SCNC: 2.8 MMOL/L (ref 3.4–5.3)
PROT SERPL-MCNC: 7.1 G/DL (ref 6.8–8.8)
RBC # BLD AUTO: 2.41 10E12/L (ref 4.4–5.9)
SODIUM SERPL-SCNC: 144 MMOL/L (ref 133–144)
WBC # BLD AUTO: 33.8 10E9/L (ref 4–11)

## 2021-07-04 PROCEDURE — 85610 PROTHROMBIN TIME: CPT | Performed by: STUDENT IN AN ORGANIZED HEALTH CARE EDUCATION/TRAINING PROGRAM

## 2021-07-04 PROCEDURE — 80053 COMPREHEN METABOLIC PANEL: CPT | Performed by: STUDENT IN AN ORGANIZED HEALTH CARE EDUCATION/TRAINING PROGRAM

## 2021-07-04 PROCEDURE — 83735 ASSAY OF MAGNESIUM: CPT | Performed by: STUDENT IN AN ORGANIZED HEALTH CARE EDUCATION/TRAINING PROGRAM

## 2021-07-04 PROCEDURE — 999N000065 XR ABDOMEN PORT 1 VIEWS

## 2021-07-04 PROCEDURE — 250N000013 HC RX MED GY IP 250 OP 250 PS 637: Performed by: STUDENT IN AN ORGANIZED HEALTH CARE EDUCATION/TRAINING PROGRAM

## 2021-07-04 PROCEDURE — 74018 RADEX ABDOMEN 1 VIEW: CPT | Mod: 26 | Performed by: RADIOLOGY

## 2021-07-04 PROCEDURE — 74176 CT ABD & PELVIS W/O CONTRAST: CPT

## 2021-07-04 PROCEDURE — 36592 COLLECT BLOOD FROM PICC: CPT | Performed by: STUDENT IN AN ORGANIZED HEALTH CARE EDUCATION/TRAINING PROGRAM

## 2021-07-04 PROCEDURE — 83605 ASSAY OF LACTIC ACID: CPT | Performed by: INTERNAL MEDICINE

## 2021-07-04 PROCEDURE — P9047 ALBUMIN (HUMAN), 25%, 50ML: HCPCS | Performed by: STUDENT IN AN ORGANIZED HEALTH CARE EDUCATION/TRAINING PROGRAM

## 2021-07-04 PROCEDURE — 250N000011 HC RX IP 250 OP 636: Performed by: STUDENT IN AN ORGANIZED HEALTH CARE EDUCATION/TRAINING PROGRAM

## 2021-07-04 PROCEDURE — 85027 COMPLETE CBC AUTOMATED: CPT | Performed by: STUDENT IN AN ORGANIZED HEALTH CARE EDUCATION/TRAINING PROGRAM

## 2021-07-04 PROCEDURE — 120N000003 HC R&B IMCU UMMC

## 2021-07-04 PROCEDURE — 99233 SBSQ HOSP IP/OBS HIGH 50: CPT | Mod: GC | Performed by: INTERNAL MEDICINE

## 2021-07-04 PROCEDURE — 36592 COLLECT BLOOD FROM PICC: CPT | Performed by: INTERNAL MEDICINE

## 2021-07-04 PROCEDURE — 74018 RADEX ABDOMEN 1 VIEW: CPT

## 2021-07-04 PROCEDURE — 99233 SBSQ HOSP IP/OBS HIGH 50: CPT | Performed by: INTERNAL MEDICINE

## 2021-07-04 PROCEDURE — 250N000011 HC RX IP 250 OP 636: Performed by: NURSE PRACTITIONER

## 2021-07-04 PROCEDURE — 74176 CT ABD & PELVIS W/O CONTRAST: CPT | Mod: 26 | Performed by: RADIOLOGY

## 2021-07-04 PROCEDURE — 250N000013 HC RX MED GY IP 250 OP 250 PS 637: Performed by: INTERNAL MEDICINE

## 2021-07-04 PROCEDURE — 84132 ASSAY OF SERUM POTASSIUM: CPT | Performed by: STUDENT IN AN ORGANIZED HEALTH CARE EDUCATION/TRAINING PROGRAM

## 2021-07-04 RX ORDER — POTASSIUM CHLORIDE 1.5 G/1.58G
40 POWDER, FOR SOLUTION ORAL ONCE
Status: COMPLETED | OUTPATIENT
Start: 2021-07-04 | End: 2021-07-04

## 2021-07-04 RX ORDER — POTASSIUM CHLORIDE 750 MG/1
40 TABLET, EXTENDED RELEASE ORAL ONCE
Status: DISCONTINUED | OUTPATIENT
Start: 2021-07-04 | End: 2021-07-04

## 2021-07-04 RX ADMIN — LACTULOSE 20 G: 10 SOLUTION ORAL; RECTAL at 04:18

## 2021-07-04 RX ADMIN — ONDANSETRON 4 MG: 2 INJECTION INTRAMUSCULAR; INTRAVENOUS at 08:31

## 2021-07-04 RX ADMIN — THIAMINE HYDROCHLORIDE 100 MG: 100 INJECTION, SOLUTION INTRAMUSCULAR; INTRAVENOUS at 08:31

## 2021-07-04 RX ADMIN — POTASSIUM CHLORIDE 20 MEQ: 1.5 POWDER, FOR SOLUTION ORAL at 21:23

## 2021-07-04 RX ADMIN — PANTOPRAZOLE SODIUM 40 MG: 40 TABLET, DELAYED RELEASE ORAL at 08:32

## 2021-07-04 RX ADMIN — ALBUMIN HUMAN 50 G: 0.25 SOLUTION INTRAVENOUS at 08:33

## 2021-07-04 RX ADMIN — MEROPENEM 500 MG: 500 INJECTION, POWDER, FOR SOLUTION INTRAVENOUS at 21:21

## 2021-07-04 RX ADMIN — LACTULOSE 20 G: 10 SOLUTION ORAL at 14:02

## 2021-07-04 RX ADMIN — MELATONIN TAB 3 MG 6 MG: 3 TAB at 21:23

## 2021-07-04 RX ADMIN — RIFAXIMIN 550 MG: 550 TABLET ORAL at 21:23

## 2021-07-04 RX ADMIN — FOLIC ACID 1 MG: 1 TABLET ORAL at 08:32

## 2021-07-04 RX ADMIN — LACTULOSE 20 G: 10 SOLUTION ORAL; RECTAL at 06:11

## 2021-07-04 RX ADMIN — LACTULOSE 20 G: 10 SOLUTION ORAL; RECTAL at 00:42

## 2021-07-04 RX ADMIN — LACTULOSE 20 G: 10 SOLUTION ORAL; RECTAL at 11:25

## 2021-07-04 RX ADMIN — POTASSIUM CHLORIDE 20 MEQ: 1.5 POWDER, FOR SOLUTION ORAL at 08:33

## 2021-07-04 RX ADMIN — THIAMINE HCL TAB 100 MG 100 MG: 100 TAB at 08:32

## 2021-07-04 RX ADMIN — PROCHLORPERAZINE EDISYLATE 5 MG: 5 INJECTION INTRAMUSCULAR; INTRAVENOUS at 03:55

## 2021-07-04 RX ADMIN — POTASSIUM CHLORIDE 40 MEQ: 1.5 POWDER, FOR SOLUTION ORAL at 11:21

## 2021-07-04 RX ADMIN — RIFAXIMIN 550 MG: 550 TABLET ORAL at 08:33

## 2021-07-04 RX ADMIN — LACTULOSE 20 G: 10 SOLUTION ORAL; RECTAL at 23:31

## 2021-07-04 RX ADMIN — ONDANSETRON 4 MG: 2 INJECTION INTRAMUSCULAR; INTRAVENOUS at 23:39

## 2021-07-04 RX ADMIN — LACTULOSE 100 G: 10 SOLUTION ORAL; RECTAL at 02:00

## 2021-07-04 RX ADMIN — LACTULOSE 20 G: 10 SOLUTION ORAL at 21:40

## 2021-07-04 RX ADMIN — LACTULOSE 20 G: 10 SOLUTION ORAL at 08:32

## 2021-07-04 ASSESSMENT — ACTIVITIES OF DAILY LIVING (ADL)
ADLS_ACUITY_SCORE: 22

## 2021-07-04 ASSESSMENT — MIFFLIN-ST. JEOR: SCORE: 1669.77

## 2021-07-04 NOTE — PROVIDER NOTIFICATION
Time of notification: 1:19 AM  Provider notified: Daniela EMELYN intern #[id number]  Patient status: Pt has had 2 episodes of emesis directly after taking oral medications. Rectal tube fell out and was replaced. No abdominal tenderness. Gave zofran after first episode of emesis and pt was able to tolerate oral lactulose at 2200. Pt is not endorsing nausea. Emesis is orange with large chunks of undigested food.     Updated provider again at 0240. After dose of rectal lactulose pt had large stool leakage. It doesn't seem like the patient is receiving adequate lactulose dosing and is not clearing mentally. Asked about the possibility of NG tube placement.     Orders: Provider ordered PRN compazine. Plan to give compazine 15-30 minutes before next dose of lactulose at 0400. If pt is unable to tolerate oral lactulose at this point, provider to order NG tube placement. Provider ordered x-ray to rule out ileus.  Temp:  [98.1  F (36.7  C)-99.7  F (37.6  C)] 99.1  F (37.3  C)  Pulse:  [112-120] 117  Resp:  [16-22] 16  BP: ()/() 98/50  SpO2:  [57 %-98 %] 95 %

## 2021-07-04 NOTE — PLAN OF CARE
Problem: Adult Inpatient Plan of Care  Goal: Plan of Care Review  Outcome: No Change  Flowsheets (Taken 7/3/2021 1942)  Plan of Care Reviewed With: patient  Progress: no change  Goal: Absence of Hospital-Acquired Illness or Injury  Outcome: Improving  Intervention: Identify and Manage Fall Risk  Recent Flowsheet Documentation  Taken 7/3/2021 1600 by Karoline Reich RN  Safety Promotion/Fall Prevention:   bed alarm on   assistive device/personal items within reach   fall prevention program maintained   clutter free environment maintained  Taken 7/3/2021 1200 by Karoline Reich RN  Safety Promotion/Fall Prevention:   bed alarm on   assistive device/personal items within reach   fall prevention program maintained   clutter free environment maintained  Taken 7/3/2021 0800 by Karoline Reich RN  Safety Promotion/Fall Prevention:   bed alarm on   assistive device/personal items within reach   fall prevention program maintained   clutter free environment maintained  Intervention: Prevent Skin Injury  Recent Flowsheet Documentation  Taken 7/3/2021 1600 by Karoline Reich RN  Body Position: turned  Taken 7/3/2021 1200 by Karoline Reich RN  Body Position: turned  Taken 7/3/2021 0800 by Karoline Reich RN  Body Position: turned  Intervention: Prevent and Manage VTE (Venous Thromboembolism) Risk  Recent Flowsheet Documentation  Taken 7/3/2021 1600 by Karoline Reich RN  VTE Prevention/Management:   PROM (passive range of motion) performed   anticoagulant therapy maintained  Taken 7/3/2021 1200 by Karoline Reich RN  VTE Prevention/Management:   PROM (passive range of motion) performed   anticoagulant therapy maintained  Taken 7/3/2021 0800 by Karoline Reich RN  VTE Prevention/Management:   PROM (passive range of motion) performed   anticoagulant therapy maintained  Goal: Optimal Comfort and Wellbeing  Outcome: No Change     Problem: Adjustment to Illness (Delirium)  Goal: Optimal Coping  Outcome: No Change      Problem: Altered Behavior (Delirium)  Goal: Improved Behavioral Control  Outcome: No Change     Problem: Attention and Thought Clarity Impairment (Delirium)  Goal: Improved Attention and Thought Clarity  Outcome: No Change  Intervention: Maximize Cognitive Function  Recent Flowsheet Documentation  Taken 7/3/2021 1600 by Karoline Reich RN  Sensory Stimulation Regulation: care clustered  Reorientation Measures:   clock in view   reorientation provided  Taken 7/3/2021 1200 by Karoline Reich RN  Sensory Stimulation Regulation: care clustered  Reorientation Measures:   clock in view   reorientation provided  Taken 7/3/2021 0800 by Karoline Reich RN  Sensory Stimulation Regulation: care clustered  Reorientation Measures:   clock in view   reorientation provided     Problem: Fluid Imbalance (Pancreatitis)  Goal: Fluid Balance  Intervention: Monitor and Manage Fluid Balance  Recent Flowsheet Documentation  Taken 7/3/2021 1600 by Karoline Reich RN  Fluid/Electrolyte Management: fluids provided  Taken 7/3/2021 1200 by Karoline Reich RN  Fluid/Electrolyte Management: fluids provided  Taken 7/3/2021 0800 by Karoline Reich RN  Fluid/Electrolyte Management: fluids provided   Pt mental status waxes and wanes. Pt alert and oriented to self most of the day. Pt continues on RA, no oxygenation issues at this time. Pt continues on [place] scale, currently stage 2. PRN lactulose administered as ordered. Pt Hgb 6.7, 1u PRBC administered as ordered.  Pt brother at bedside. All questions and concerns addressed. Report given to oncoming RN.

## 2021-07-04 NOTE — PLAN OF CARE
"/57 (BP Location: Right arm)   Pulse 123   Temp 98.3  F (36.8  C) (Oral)   Resp 20   Ht 1.676 m (5' 6\")   Wt 77.2 kg (170 lb 3.2 oz)   SpO2 93%   BMI 27.47 kg/m      Neuro: A&O to self. Calm and resting in bed. At times will grab for visible lines. Leaves hidden lines alone  Cardiac: -120s  Respiratory:  Maintaining adequate O2 sats via room air  GI/: anuric- to dialyze tomorrow, 1 watery incontinent BM  Diet/appetite:  DD3 diet orders. Poor appetite. Occasional emesis  Activity:  Assist of 2 with bed repositioning  Pain: denies pain when asked. Not showing S/Sx of pain  Skin: jaundiced, no new deficits noted  LDA's: PICC L, R HD line, NG clamped, pancreatic drain with moderate amounts of brown/red drainage    Plan: Nursing will continue to follow the POC and update the MD team with concerns.    Kelly Guzman RN  6B Intermediate Care    "

## 2021-07-04 NOTE — PROGRESS NOTES
[facility]    Progress Note - [place] 3 Service        Date of Admission:  6/28/2021    Assessment & Plan      Dax Villareal is a 30 year old male with hx of alcohol use disorder admitted on 6/28/2021 after recent prolonged admission at [facility] for acute alcoholic hepatitis and ESLD c/b hepatic encephalopathy, ARF requiring hemodialysis, acute respiratory failure and septic shock in the setting of acute necrotizing pancreatitis. His acute respiratory failure and septic shock have improved, but he requires continued management for ARF, hepatic encephalopathy, and new diagnosis of SBP.     Changes today:  - NGT placement   - CT Abdomen Pelvis w/o Contrast per GI      #Necrotizing pancreatitis w/ pseudocyst, improving  #SBP  #Profound leukocytosis, improving  Etiology of the septic shock that he came in with likely due to necrotizing pancreatitis +/- SBP. Had percutaneous drain placed for drainage of pancreatic collection. Diagnostic para returned with SBP, likely 2/2 pancreatitis (SAAG 0.4) but clouded by underlying liver disease. GI/surgery conference 7/1 recommended no endoscopic transluminal drainage for now.    - Albumin 25% 50 g x3 days   - Continue meropenem 500 mg q24h  - Follow up cultures   - Daily CBC  - MAP goal > 65  - GI consulted, recs appreciated     > 7/4 - repeat CT Abd/Pelvis w/o Contrast (will review and discuss plan for managing pancreatitis)     > Repeat diagnostic para in 3 days to ensure WBC is down trending      # Decompensated EtOH cirrhosis c/b hepatic encephalopathy and recurrent ascites  # Hx of Alcohol use disorder c/b withdrawal   # Anemia  # Coagulopathy   Patient with a history of marked alcohol use and alcoholic hepatitis in the past. Was given a course of steroids (1 week) during previous admission at OSH. Patient mental status somewhat improved since yesterday.     - Lactulose 20g TID and PRN and rifaximin 550mg BID (titrate to  1.5L stool/day)  - s/p 1 unit(s) pRBC 7/3  - Trend MELD labs  - Daily folic acid and thiamine  - Delirium precautions  - Melatonin 6mg at bedtime  - Avoid benzos and opioids as able  - Transfuse if hemoglobin < 7, platelet < 10 or < 30 with bleeding  - Continue to monitor for signs of bleeding     MELD-Na score: 38 at 7/4/2021  6:09 AM  MELD score: 38 at 7/4/2021  6:09 AM  Calculated from:  Serum Creatinine: 3.43 mg/dL at 7/4/2021  6:09 AM  Serum Sodium: 144 mmol/L (Rounded to 137 mmol/L) at 7/4/2021  6:09 AM  Total Bilirubin: 24.1 mg/dL at 7/4/2021  6:09 AM  INR(ratio): 2.11 at 7/4/2021  6:09 AM  Age: 30 years 8 months     # ARF on HD (MWF)  # Hypervolemia causing acute respiratory failure, improving  - Nephrology consulted, recs appreciated     > Hemodialysis MWF              - Midodrine 10 mg PRN before dialysis sessions     > Daily chemistry labs, strict I/O, and daily weights  - Aspiration precautions  - Supplemental oxygen as needed to keep sats above 89%     # Severe malnutrition in setting of chronic illness  - Dysphagia level 3 diet with thin liquids  - No plans to replace NJ  - Continue multivitamins  - Pantoprazole 40 mg IV daily  - Zofran PRN     Diet: Snacks/Supplements Adult: Ensure Clear; With Meals  Dysphagia Diet Level 3 Advanced Thin Liquids (water, ice chips, juice, milk gelatin, ice cream, etc)    DVT Prophylaxis: Pneumatic Compression Devices  Rdz Catheter: Not present  Fluids: 5 mL TKO  Central Lines: PRESENT  PICC Triple Lumen 06/28/21 Left-Site Assessment: WDL  CVC Double Lumen Right-Site Assessment: WDL  Code Status: Full Code      Disposition Plan   Expected discharge: > 7 days, recommended to transitional care unit once mental status at baseline, renal function improved and necrotizing pancreatitis is well managed .     The patient's care was discussed with the Attending Physician, Dr. Gonzales.    Eris Miles MD  PGY-2, Internal Medicine     [address]  "General acute hospital  Securely message with the [employer] Web Console (learn more here)  Text page via [facility] Paging/Directory  Please see sign in/sign out for up to date coverage information  ______________________________________________________________________    Interval History   NAEO. Nursing notes reviewed.     Patient with brother at bedside. Feels \"bloated\". No complaints of chest pain, shortness of breath, or blood in stool. Having nausea/vomiting. Concerns from staff that he may not be getting all of his lactulose.       Data reviewed today: I reviewed all medications, new labs and imaging results over the last 24 hours. I personally reviewed the EKG tracing showing supraventricular tachycardia, no changes from previous EKG.    Physical Exam   Vital Signs: Temp: 99.2  F (37.3  C) Temp src: Oral BP: 101/58 Pulse: 120   Resp: 16 SpO2: 95 % O2 Device: None (Room air)    Weight: 170 lbs 3.2 oz    Gen: Sitting up bed, appears moderately uncomfortable. Brother at bedside.   HEENT: Conjunctival icterus  CV: Tachycardic. Regular rhythm.   Resp: Breathing comfortably on RA  Abd: Soft, distended, tender over epigastric area.   Skin: Jaundiced  Ext: Well perfused, no edema  Mental status/Psych: Alert and oriented to person, place, and year. Otherwise has nonsensical/cyclic speech.      Data   Recent Labs   Lab 07/04/21  0609 07/03/21  2114 07/03/21  0603 07/02/21  0505 06/28/21  1839 06/28/21  1839   WBC 33.8*  --  35.2* 38.7*   < > 50.6*   HGB 7.8* 7.6* 6.7* 7.0*   < > 7.6*     --  103* 102*   < > 101*     --  147* 184   < > 127*   INR 2.11*  --  2.08* 2.24*   < > 3.76*     --  137 137   < > 134   POTASSIUM 2.8*  --  3.3* 3.3*   < > 3.4   CHLORIDE 111*  --  104 107   < > 101   CO2 21  --  23 20   < > 27   BUN 35*  --  23 35*   < > 14   CR 3.43*  --  2.66* 3.28*   < > 1.78*   ANIONGAP 11  --  10 10   < > 6   JANENE 8.6  --  8.4* 8.2*   < > 8.2*   *  --  94 113*   < > 119* "   ALBUMIN 3.3*  --  2.9* 2.4*   < > 3.0*   PROTTOTAL 7.1  --  6.9 6.8   < > 6.8   BILITOTAL 24.1*  --  22.4* 21.0*   < > 21.3*   ALKPHOS 169*  --  151* 144   < > 90   ALT 22  --  23 26   < > 23   AST 84*  --  83* 80*   < > 74*   LIPASE  --   --   --   --   --  199    < > = values in this interval not displayed.     Recent Results (from the past 24 hour(s))   XR Abdomen Port 1 View    Narrative    Exam: XR ABDOMEN PORT 1 VIEWS, 7/4/2021 8:36 AM    Indication: R/o Obstruction    Comparison: CT dated 6/29/2021. Abdominal radiograph dated 6/30/2021.    Findings:   Supine view of the abdomen is obtained. Abdominal drain projects over  the left hemiabdomen. A few, air-filled nondilated loops of small  bowel measuring up to 2.7 cm over the mid low abdomen is similar to  prior radiograph and CT. Paucity of bowel gas throughout the large  bowel and upper abdomen similar to prior and corresponding to ascites  and peripancreatic fluid collections seen on comparison CT. No  definite pneumatosis or portal venous gas. No gross osseous  abnormality.      Impression    Impression: Nonobstructive bowel gas pattern.    I have personally reviewed the examination and initial interpretation  and I agree with the findings.    SKYLAR KAPADIA,           SYSTEM ID:  [id number]

## 2021-07-04 NOTE — PROGRESS NOTES
Nephrology Progress Note  07/04/2021     Dax Villareal is a 30 year old male with h/o ETOH hepatitis, end stage liver disease, RADHA on HD, transferred from [facility] in [place] for septic shock on 6/28/21 for treatment of necrotizing pancreatitis and decompensated liver disease. He was initially admitted to [facility] 5/19/21.       Assessment & Recommendations:     1 RADHA - Remains dialysis dependent/anuric.   Last HD prior to hospital admission was 6/26/21. He has not had any OP HD at this time. Has been receiving inpatient HD at [facility] prior to transfer to [facility] on 6/28/21   - Etiology of RADHA felt to be JORGE L, abx, pancreatitis, sepsis, HRS   - Has Right TDC   - Will continue MWF schedule.  No indication for HD today     2. Volume status - Hypervolemia with hypoxia/ascites, on supplemental oxygen. Has bilateral small pleural effusions at lung bases on CT 6/29.   Weight will be unreliable given large ascites. SBP ~ 100/ Intake 920 ml/Output: UO none, Stool 1325 ml, Drain 475 ml, for net fluid loss of 0.8 Kg    - Strict I/O please     3. ETOH hepatitis/ recurrent ascites, HE - Patient with heavy ETOH use. Last ETOH unknown. Currently on Lactulose/Rifax, thiamine, Folate. T bili 21.0   - Getting rectal lactulose following my observation this morning   - Per GI     4. Electrolytes - Hypokalemia with stool output.   - continue oral replacement prn KCL 20 meq twice daily    5- Overall prognosis is very poor if Mr Villareal is not a liver transplant candidate.  I have relayed this information to Mr Villareal's brother.      Recommendations were communicated to primary team via progress note    Ladarius Reid MD  Division of Nephrology and Hypertension  Pager: [phone]  July 4, 2021  8:48 AM      Interval History : July 3, 2021  Patient seen and examined this am  Nursing and provider notes from last 24 hours reviewed.  Patient alert, but confused. Speech is slurred,  incoherent  No urine output recorded  Received HD 7/2/21  "    Review of Systems:   I reviewed the following systems:  Unreliable given encephalopathy    Physical Exam:   I/O last 3 completed shifts:  In: 667 [P.O.:200; I.V.:110; Other:10]  Out: 1300 [Stool:1300]   BP 98/48 (BP Location: Right arm)   Pulse 115   Temp 99.2  F (37.3  C) (Oral)   Resp 16   Ht 1.676 m (5' 6\")   Wt 77.2 kg (170 lb 3.2 oz)   SpO2 93%   BMI 27.47 kg/m       GENERAL APPEARANCE: encephalopathic but calm  EYES:  scleral icterus, pupils equal  Pulmonary: lungs clear to auscultation.    CV: tachy.  No rub heard   - Edema - no LE edema  GI: large distended,   MS: no evidence of inflammation in joints, no muscle tenderness  SKIN: no rash, warm, dry, no cyanosis, marked jaundice  NEURO: increased encephalopathy.  asterixis  ACCESS: Right TDC     Labs:   All labs reviewed by me  Electrolytes/Renal -   Recent Labs   Lab Test 07/04/21  0609 07/03/21  0603 07/02/21  0505 06/30/21  0445 06/30/21  0445 06/29/21  0410    137 137   < > 138 138   POTASSIUM 2.8* 3.3* 3.3*   < > 3.1* 3.3*   CHLORIDE 111* 104 107   < > 104 103   CO2 21 23 20   < > 22 25   BUN 35* 23 35*   < > 34* 20   CR 3.43* 2.66* 3.28*   < > 3.02* 2.25*   * 94 113*   < > 130* 112*   JANENE 8.6 8.4* 8.2*   < > 8.4* 7.9*   MAG 2.1  --   --   --  2.4* 2.0   PHOS  --   --   --   --  5.3* 2.3*    < > = values in this interval not displayed.       CBC -   Recent Labs   Lab Test 07/04/21  0609 07/03/21  2114 07/03/21  0603 07/02/21  0505   WBC 33.8*  --  35.2* 38.7*   HGB 7.8* 7.6* 6.7* 7.0*     --  147* 184       LFTs -   Recent Labs   Lab Test 07/04/21  0609 07/03/21  0603 07/02/21  0505   ALKPHOS 169* 151* 144   BILITOTAL 24.1* 22.4* 21.0*   ALT 22 23 26   AST 84* 83* 80*   PROTTOTAL 7.1 6.9 6.8   ALBUMIN 3.3* 2.9* 2.4*       Iron Panel - No lab results found.      Imaging:      Current Medications:    albumin human  50 g Intravenous Daily     folic acid  1 mg Oral Daily    Or     folic acid  1 mg Intravenous Daily     " lactulose  20 g Oral or NG Tube TID     melatonin  6 mg Oral At Bedtime     meropenem  500 mg Intravenous Q24H     pantoprazole  40 mg Oral QAM AC     potassium chloride  20 mEq Oral BID     potassium chloride  40 mEq Oral Once     rifaximin  550 mg Oral BID     sodium chloride (PF)  10 mL Irrigation Q8H     sodium chloride (PF)  3 mL Intracatheter Q8H     vitamin B1  100 mg Oral Daily    Or     thiamine  100 mg Intravenous Daily       - MEDICATION INSTRUCTIONS -       Ladarius Reid MD

## 2021-07-04 NOTE — PLAN OF CARE
"Blood pressure 112/57, pulse 123, temperature 98.3  F (36.8  C), temperature source Oral, resp. rate 20, height 1.676 m (5' 6\"), weight 77.2 kg (170 lb 3.2 oz), SpO2 93 %.  Neuro:  Confused, alert, not following commands, illogical speech, slight aggression verbally   Cardiac: ST with 's-120's. VSS.   Respiratory: Sating >92% on RA.  GI/: Anuric- hemodialysis dependent. BM X3-4 loose stools, incontinent  Diet/appetite: Not able to eat, persistent nausea/vomiting- NG placed- some bleeding noted but stopped, still vomiting after meds through NG tube- intermittent decompression prior to med administration seems to help, patient may benefit from NJ tube vs NG tube.  Activity:  Assist of 2 up with Lift  Pain: At acceptable level on current regimen.   Skin: No new deficits noted.  LDA's: right chest dialysis CVC, Left PICC    Plan: Continue with POC. Notify primary team with changes.    "

## 2021-07-04 NOTE — PLAN OF CARE
Assumed care from 1900 to 0730.    Vitals:    07/03/21 1730 07/03/21 1922 07/04/21 0035 07/04/21 0332   BP:  98/57 98/50 116/55   BP Location:  Right arm Right arm Right arm   Pulse: 118 119 117 118   Resp:  17 16 16   Temp:  98.1  F (36.7  C) 99.1  F (37.3  C) 98.2  F (36.8  C)   TempSrc:  Oral Oral Axillary   SpO2: 96% 95% 95% 95%   Weight:   77.2 kg (170 lb 3.2 oz)    Height:            Vitals: VSS. Slightly elevated temp of 99.1.   Pain: Denying pain when awake. No nonverbal indicators of pain when sleeping.   Cardiovascular: Sinus tachycardia with HR in the 110s -120s.   Neuro: Disoriented to time, place, and situation, and occasionally to person. PRN lactulose given q2hr for stage II [place] scoring. Pt was visibly agitated, frustrated, and occasionally aggressive. Speech was illogical and inappropriate.   Respiratory: Sating > 92% on RA. Frequent fair cough.   GI: Rectal tube was replaced x 1. After it fell out a second time, placed a brief. Pt had continuous watery green/brown stool. After giving oral meds, pt had 2 episodes of emesis. See provider notification regarding emesis and possible NG tube placement. At 0400 and 0600 pre-dosed patient with compazine/zofran and gave oral lactulose very slowly; tolerated without more nausea/vomiting.   : Anuric. HD MWF.   Activity: Bedrest. Repositioned independently.  Skin: No new deficits noted.   LDAs: PICC is saline locked/TKO for antibiotics. CVC for HD. Panc drain had moderate dark red/brown output with several large clots.     Summary:     Plan: Continue with POC. Notify primary team with changes.

## 2021-07-05 ENCOUNTER — ANCILLARY PROCEDURE (OUTPATIENT)
Dept: ULTRASOUND IMAGING | Facility: CLINIC | Age: 31
End: 2021-07-05
Attending: PEDIATRICS

## 2021-07-05 LAB
ALBUMIN FLD-MCNC: 2.6 G/DL
ALBUMIN SERPL-MCNC: 3.4 G/DL (ref 3.4–5)
ALP SERPL-CCNC: 168 U/L (ref 40–150)
ALT SERPL W P-5'-P-CCNC: 21 U/L (ref 0–70)
ANION GAP SERPL CALCULATED.3IONS-SCNC: 14 MMOL/L (ref 3–14)
APPEARANCE FLD: NORMAL
AST SERPL W P-5'-P-CCNC: 78 U/L (ref 0–45)
BACTERIA SPEC CULT: NO GROWTH
BASOPHILS NFR FLD MANUAL: 1 %
BILIRUB SERPL-MCNC: 24.7 MG/DL (ref 0.2–1.3)
BUN SERPL-MCNC: 52 MG/DL (ref 7–30)
CALCIUM SERPL-MCNC: 9 MG/DL (ref 8.5–10.1)
CHLORIDE SERPL-SCNC: 116 MMOL/L (ref 94–109)
CO2 SERPL-SCNC: 18 MMOL/L (ref 20–32)
COLOR FLD: NORMAL
CREAT SERPL-MCNC: 4.74 MG/DL (ref 0.66–1.25)
ERYTHROCYTE [DISTWIDTH] IN BLOOD BY AUTOMATED COUNT: 27.4 % (ref 10–15)
ERYTHROCYTE [DISTWIDTH] IN BLOOD BY AUTOMATED COUNT: ABNORMAL % (ref 10–15)
GFR SERPL CREATININE-BSD FRML MDRD: 15 ML/MIN/{1.73_M2}
GLUCOSE BLDC GLUCOMTR-MCNC: 155 MG/DL (ref 70–99)
GLUCOSE SERPL-MCNC: 123 MG/DL (ref 70–99)
GRAM STN SPEC: NORMAL
GRAM STN SPEC: NORMAL
HCT VFR BLD AUTO: 23.2 % (ref 40–53)
HCT VFR BLD AUTO: 25 % (ref 40–53)
HGB BLD-MCNC: 7.4 G/DL (ref 13.3–17.7)
HGB BLD-MCNC: 7.9 G/DL (ref 13.3–17.7)
INR PPP: 2.18 (ref 0.86–1.14)
LACTATE BLD-SCNC: 2 MMOL/L (ref 0.7–2)
LACTATE BLD-SCNC: 2.4 MMOL/L (ref 0.7–2)
LIPASE FLD-CCNC: 48 U/L
LYMPHOCYTES NFR FLD MANUAL: 32 %
MAGNESIUM SERPL-MCNC: 2.3 MG/DL (ref 1.6–2.3)
MCH RBC QN AUTO: 31.9 PG (ref 26.5–33)
MCH RBC QN AUTO: 31.9 PG (ref 26.5–33)
MCHC RBC AUTO-ENTMCNC: 31.6 G/DL (ref 31.5–36.5)
MCHC RBC AUTO-ENTMCNC: 31.9 G/DL (ref 31.5–36.5)
MCV RBC AUTO: 100 FL (ref 78–100)
MCV RBC AUTO: 101 FL (ref 78–100)
MONOS+MACROS NFR FLD MANUAL: 6 %
NEUTS BAND NFR FLD MANUAL: 61 %
PHOSPHATE SERPL-MCNC: 4.9 MG/DL (ref 2.5–4.5)
PLATELET # BLD AUTO: 180 10E9/L (ref 150–450)
PLATELET # BLD AUTO: 211 10E9/L (ref 150–450)
POTASSIUM SERPL-SCNC: 3.1 MMOL/L (ref 3.4–5.3)
PROT FLD-MCNC: 5.2 G/DL
PROT SERPL-MCNC: 7.8 G/DL (ref 6.8–8.8)
RBC # BLD AUTO: 2.32 10E12/L (ref 4.4–5.9)
RBC # BLD AUTO: 2.48 10E12/L (ref 4.4–5.9)
SODIUM SERPL-SCNC: 148 MMOL/L (ref 133–144)
SPECIMEN SOURCE FLD: NORMAL
SPECIMEN SOURCE: NORMAL
WBC # BLD AUTO: 29.4 10E9/L (ref 4–11)
WBC # BLD AUTO: 32.7 10E9/L (ref 4–11)
WBC # FLD AUTO: 1801 /UL

## 2021-07-05 PROCEDURE — 250N000011 HC RX IP 250 OP 636: Performed by: PHYSICIAN ASSISTANT

## 2021-07-05 PROCEDURE — 36592 COLLECT BLOOD FROM PICC: CPT | Performed by: STUDENT IN AN ORGANIZED HEALTH CARE EDUCATION/TRAINING PROGRAM

## 2021-07-05 PROCEDURE — 99233 SBSQ HOSP IP/OBS HIGH 50: CPT | Mod: GC | Performed by: INTERNAL MEDICINE

## 2021-07-05 PROCEDURE — 36592 COLLECT BLOOD FROM PICC: CPT | Performed by: PHYSICIAN ASSISTANT

## 2021-07-05 PROCEDURE — 250N000013 HC RX MED GY IP 250 OP 250 PS 637: Performed by: STUDENT IN AN ORGANIZED HEALTH CARE EDUCATION/TRAINING PROGRAM

## 2021-07-05 PROCEDURE — 99233 SBSQ HOSP IP/OBS HIGH 50: CPT | Performed by: INTERNAL MEDICINE

## 2021-07-05 PROCEDURE — 83690 ASSAY OF LIPASE: CPT | Performed by: STUDENT IN AN ORGANIZED HEALTH CARE EDUCATION/TRAINING PROGRAM

## 2021-07-05 PROCEDURE — 87102 FUNGUS ISOLATION CULTURE: CPT | Performed by: STUDENT IN AN ORGANIZED HEALTH CARE EDUCATION/TRAINING PROGRAM

## 2021-07-05 PROCEDURE — 84100 ASSAY OF PHOSPHORUS: CPT | Performed by: STUDENT IN AN ORGANIZED HEALTH CARE EDUCATION/TRAINING PROGRAM

## 2021-07-05 PROCEDURE — 999N001017 HC STATISTIC GLUCOSE BY METER IP

## 2021-07-05 PROCEDURE — 49083 ABD PARACENTESIS W/IMAGING: CPT | Performed by: PEDIATRICS

## 2021-07-05 PROCEDURE — 83605 ASSAY OF LACTIC ACID: CPT | Performed by: PHYSICIAN ASSISTANT

## 2021-07-05 PROCEDURE — 258N000003 HC RX IP 258 OP 636: Performed by: INTERNAL MEDICINE

## 2021-07-05 PROCEDURE — 85610 PROTHROMBIN TIME: CPT | Performed by: STUDENT IN AN ORGANIZED HEALTH CARE EDUCATION/TRAINING PROGRAM

## 2021-07-05 PROCEDURE — 83735 ASSAY OF MAGNESIUM: CPT | Performed by: STUDENT IN AN ORGANIZED HEALTH CARE EDUCATION/TRAINING PROGRAM

## 2021-07-05 PROCEDURE — 87070 CULTURE OTHR SPECIMN AEROBIC: CPT | Performed by: STUDENT IN AN ORGANIZED HEALTH CARE EDUCATION/TRAINING PROGRAM

## 2021-07-05 PROCEDURE — 250N000011 HC RX IP 250 OP 636: Performed by: STUDENT IN AN ORGANIZED HEALTH CARE EDUCATION/TRAINING PROGRAM

## 2021-07-05 PROCEDURE — P9047 ALBUMIN (HUMAN), 25%, 50ML: HCPCS | Performed by: PHYSICIAN ASSISTANT

## 2021-07-05 PROCEDURE — 85027 COMPLETE CBC AUTOMATED: CPT | Performed by: STUDENT IN AN ORGANIZED HEALTH CARE EDUCATION/TRAINING PROGRAM

## 2021-07-05 PROCEDURE — 83605 ASSAY OF LACTIC ACID: CPT | Performed by: PEDIATRICS

## 2021-07-05 PROCEDURE — 89051 BODY FLUID CELL COUNT: CPT | Performed by: STUDENT IN AN ORGANIZED HEALTH CARE EDUCATION/TRAINING PROGRAM

## 2021-07-05 PROCEDURE — 120N000003 HC R&B IMCU UMMC

## 2021-07-05 PROCEDURE — 84157 ASSAY OF PROTEIN OTHER: CPT | Performed by: STUDENT IN AN ORGANIZED HEALTH CARE EDUCATION/TRAINING PROGRAM

## 2021-07-05 PROCEDURE — 36592 COLLECT BLOOD FROM PICC: CPT | Performed by: PEDIATRICS

## 2021-07-05 PROCEDURE — 85027 COMPLETE CBC AUTOMATED: CPT | Performed by: PHYSICIAN ASSISTANT

## 2021-07-05 PROCEDURE — P9047 ALBUMIN (HUMAN), 25%, 50ML: HCPCS | Performed by: INTERNAL MEDICINE

## 2021-07-05 PROCEDURE — 0W9G3ZZ DRAINAGE OF PERITONEAL CAVITY, PERCUTANEOUS APPROACH: ICD-10-PCS | Performed by: PEDIATRICS

## 2021-07-05 PROCEDURE — 90937 HEMODIALYSIS REPEATED EVAL: CPT

## 2021-07-05 PROCEDURE — 250N000009 HC RX 250

## 2021-07-05 PROCEDURE — 80053 COMPREHEN METABOLIC PANEL: CPT | Performed by: STUDENT IN AN ORGANIZED HEALTH CARE EDUCATION/TRAINING PROGRAM

## 2021-07-05 PROCEDURE — 87102 FUNGUS ISOLATION CULTURE: CPT

## 2021-07-05 PROCEDURE — 82042 OTHER SOURCE ALBUMIN QUAN EA: CPT | Performed by: STUDENT IN AN ORGANIZED HEALTH CARE EDUCATION/TRAINING PROGRAM

## 2021-07-05 PROCEDURE — 250N000011 HC RX IP 250 OP 636: Performed by: INTERNAL MEDICINE

## 2021-07-05 PROCEDURE — 250N000013 HC RX MED GY IP 250 OP 250 PS 637

## 2021-07-05 PROCEDURE — 87205 SMEAR GRAM STAIN: CPT | Performed by: STUDENT IN AN ORGANIZED HEALTH CARE EDUCATION/TRAINING PROGRAM

## 2021-07-05 RX ORDER — ALBUMIN (HUMAN) 12.5 G/50ML
50 SOLUTION INTRAVENOUS ONCE
Status: COMPLETED | OUTPATIENT
Start: 2021-07-05 | End: 2021-07-05

## 2021-07-05 RX ORDER — ALBUMIN (HUMAN) 12.5 G/50ML
50 SOLUTION INTRAVENOUS
Status: DISCONTINUED | OUTPATIENT
Start: 2021-07-05 | End: 2021-07-05

## 2021-07-05 RX ADMIN — Medication 550 MG: at 20:05

## 2021-07-05 RX ADMIN — LACTULOSE 20 G: 10 SOLUTION ORAL at 20:05

## 2021-07-05 RX ADMIN — ALBUMIN HUMAN 50 ML: 0.25 SOLUTION INTRAVENOUS at 11:13

## 2021-07-05 RX ADMIN — SODIUM CHLORIDE 300 ML: 9 INJECTION, SOLUTION INTRAVENOUS at 09:46

## 2021-07-05 RX ADMIN — FOLIC ACID 1 MG: 1 TABLET ORAL at 13:53

## 2021-07-05 RX ADMIN — Medication 550 MG: at 13:53

## 2021-07-05 RX ADMIN — Medication 40 MG: at 13:54

## 2021-07-05 RX ADMIN — LACTULOSE 20 G: 10 SOLUTION ORAL at 13:53

## 2021-07-05 RX ADMIN — LACTULOSE 20 G: 10 SOLUTION ORAL; RECTAL at 06:59

## 2021-07-05 RX ADMIN — LACTULOSE 20 G: 10 SOLUTION ORAL; RECTAL at 02:25

## 2021-07-05 RX ADMIN — Medication: at 09:47

## 2021-07-05 RX ADMIN — ALBUMIN HUMAN 50 ML: 0.25 SOLUTION INTRAVENOUS at 10:11

## 2021-07-05 RX ADMIN — THIAMINE HCL TAB 100 MG 100 MG: 100 TAB at 13:53

## 2021-07-05 RX ADMIN — LACTULOSE 20 G: 10 SOLUTION ORAL; RECTAL at 04:50

## 2021-07-05 RX ADMIN — ALBUMIN HUMAN 50 G: 0.25 SOLUTION INTRAVENOUS at 17:18

## 2021-07-05 RX ADMIN — LACTULOSE 20 G: 10 SOLUTION ORAL at 08:52

## 2021-07-05 RX ADMIN — POTASSIUM CHLORIDE 20 MEQ: 1.5 POWDER, FOR SOLUTION ORAL at 13:55

## 2021-07-05 RX ADMIN — POTASSIUM CHLORIDE 20 MEQ: 1.5 POWDER, FOR SOLUTION ORAL at 20:05

## 2021-07-05 RX ADMIN — MELATONIN TAB 3 MG 6 MG: 3 TAB at 21:36

## 2021-07-05 RX ADMIN — SODIUM CHLORIDE 250 ML: 9 INJECTION, SOLUTION INTRAVENOUS at 09:46

## 2021-07-05 RX ADMIN — MEROPENEM 500 MG: 500 INJECTION, POWDER, FOR SOLUTION INTRAVENOUS at 20:05

## 2021-07-05 ASSESSMENT — ACTIVITIES OF DAILY LIVING (ADL)
ADLS_ACUITY_SCORE: 22

## 2021-07-05 ASSESSMENT — MIFFLIN-ST. JEOR
SCORE: 1641.65
SCORE: 1641.75
SCORE: 1641.65

## 2021-07-05 NOTE — PROGRESS NOTES
[facility]    Progress Note - Daniela 3 Service        Date of Admission:  6/28/2021    Assessment & Plan      Dax Villareal is a 30 year old male with hx of alcohol use disorder admitted on 6/28/2021 after recent prolonged admission at [facility] for acute alcoholic hepatitis and ESLD c/b hepatic encephalopathy, ARF requiring hemodialysis, acute respiratory failure and septic shock in the setting of acute necrotizing pancreatitis. His acute respiratory failure and septic shock have improved, but he requires continued management for ARF, hepatic encephalopathy, and new diagnosis of SBP.     Changes today:  - Discussed TF with nutrition, staff with concerns that patient wasn't keep meds/food down. Patient more lucid following dialysis today, would like to avoid TF or NJ placement at this time. He would like to continue to try oral nutrition.   - HD today  - Repeat Para   - Talked with sister, Noni, and discussed patient's current clinical state and poor/guarded prognosis. Family understands and would like to continue with current therapies for now and see if there any interventions possible from GI standpoint. She is open and willing to have a care conference later this week.      #Necrotizing pancreatitis w/ pseudocyst, improving  #SBP  #Profound leukocytosis, improving  Etiology of the septic shock that he came in with likely due to necrotizing pancreatitis +/- SBP. Had percutaneous drain placed for drainage of pancreatic collection. Diagnostic para returned with SBP, likely 2/2 pancreatitis (SAAG 0.4) but clouded by underlying liver disease. GI/surgery conference 7/1 recommended no endoscopic transluminal drainage for now.    - Continue meropenem 500 mg q24h  - s/p Albumin x3 days   - Follow up cultures   - Daily CBC  - MAP goal > 65   - GI consulted     > Repeat CT Abd/Pelvis completed (w/o IV contrast due to ARF), appreciate further recs upon review      >  Repeat diagnostic para 7/5/21     # Decompensated EtOH cirrhosis c/b hepatic encephalopathy and recurrent ascites  # Hx of Alcohol use disorder c/b withdrawal   # Anemia  # Coagulopathy   Patient with a history of marked alcohol use and alcoholic hepatitis in the past. Was given a course of steroids (1 week) during previous admission at OSH. Patient mental status somewhat improved since yesterday.     - Lactulose 20g TID and PRN and rifaximin 550mg BID (titrate to 1.5L stool/day)  - s/p 1 unit(s) pRBC 7/3  - Trend MELD labs  - Daily folic acid and thiamine  - Delirium precautions  - Melatonin 6mg at bedtime  - Avoid benzos and opioids as able  - Transfuse if hemoglobin < 7, platelet < 10 or < 30 with bleeding  - Continue to monitor for signs of bleeding     MELD-Na score: 41 at 7/5/2021  6:25 AM  MELD score: 41 at 7/5/2021  6:25 AM  Calculated from:  Serum Creatinine: 4.74 mg/dL (Rounded to 4 mg/dL) at 7/5/2021  6:25 AM  Serum Sodium: 148 mmol/L (Rounded to 137 mmol/L) at 7/5/2021  6:25 AM  Total Bilirubin: 24.7 mg/dL at 7/5/2021  6:25 AM  INR(ratio): 2.18 at 7/5/2021  6:25 AM  Age: 30 years 8 months     # ARF on HD (MWF)  # Hypervolemia causing acute respiratory failure, improving  - Nephrology consulted, recs appreciated     > Hemodialysis MWF              - Midodrine 10 mg PRN before dialysis sessions     > Daily chemistry labs, strict I/O, and daily weights  - Aspiration precautions  - Supplemental oxygen as needed to keep sats above 89%     # Severe malnutrition in setting of chronic illness  - Dysphagia level 3 diet with thin liquids  - No plans to replace NJ; holding off on TF zulema  - Continue multivitamins  - Pantoprazole 40 mg IV daily  - Zofran PRN     Diet: Snacks/Supplements Adult: Ensure Clear; With Meals  Dysphagia Diet Level 3 Advanced Thin Liquids (water, ice chips, juice, milk gelatin, ice cream, etc)    DVT Prophylaxis: Pneumatic Compression Devices  Rdz Catheter: Not present  Fluids: 5 mL  TKO  Central Lines: PRESENT  PICC Triple Lumen 06/28/21 Left-Site Assessment: WDL  CVC Double Lumen Right-Site Assessment: WDL  Code Status: Full Code      Disposition Plan   Expected discharge: > 7 days, recommended to transitional care unit once mental status at baseline, renal function improved and necrotizing pancreatitis is well managed .     The patient's care was discussed with the Attending Physician, Dr. Gonzales.    Eris Miles MD  PGY-2, Internal Medicine     [address]  Securely message with the Vocera Web Console (learn more here)  Text page via [facility] Paging/Directory  Please see sign in/sign out for up to date coverage information  ______________________________________________________________________    Interval History   NAEO. Nursing notes reviewed.     Friend at bedside. No new complaints of pain. Eating ice chips, tolerated dialysis. Would like to try oral nutrition, avoid NJ and TF.       Data reviewed today: I reviewed all medications, new labs and imaging results over the last 24 hours. I personally reviewed the EKG tracing showing supraventricular tachycardia, no changes from previous EKG.    Physical Exam   Vital Signs: Temp: 98.7  F (37.1  C) Temp src: Oral BP: 100/54 Pulse: 121   Resp: 20 SpO2: 94 % O2 Device: None (Room air)    Weight: 164 lbs .36 oz    Gen: Sitting up bed, appears moderately uncomfortable. Friend at bedside.   HEENT: Conjunctival icterus  CV: Tachycardic. Regular rhythm.   Resp: Breathing comfortably on RA  Abd: Soft, distended, tender over epigastric area.   Skin: Jaundiced  Ext: Well perfused, no edema  Mental status/Psych: Alert and oriented to person, place, and year. More lucid compared to yesterday     Data   Recent Labs   Lab 07/05/21  0625 07/04/21  1413 07/04/21  0609 07/03/21  2114 07/03/21  0603 06/28/21  1839 06/28/21  1839   WBC 32.7*  --  33.8*  --  35.2*   < > 50.6*   HGB 7.9*  --  7.8*  7.6* 6.7*   < > 7.6*   *  --  100  --  103*   < > 101*     --  184  --  147*   < > 127*   INR 2.18*  --  2.11*  --  2.08*   < > 3.76*   *  --  144  --  137   < > 134   POTASSIUM 3.1* 3.3* 2.8*  --  3.3*   < > 3.4   CHLORIDE 116*  --  111*  --  104   < > 101   CO2 18*  --  21  --  23   < > 27   BUN 52*  --  35*  --  23   < > 14   CR 4.74*  --  3.43*  --  2.66*   < > 1.78*   ANIONGAP 14  --  11  --  10   < > 6   JANENE 9.0  --  8.6  --  8.4*   < > 8.2*   *  --  121*  --  94   < > 119*   ALBUMIN 3.4  --  3.3*  --  2.9*   < > 3.0*   PROTTOTAL 7.8  --  7.1  --  6.9   < > 6.8   BILITOTAL 24.7*  --  24.1*  --  22.4*   < > 21.3*   ALKPHOS 168*  --  169*  --  151*   < > 90   ALT 21  --  22  --  23   < > 23   AST 78*  --  84*  --  83*   < > 74*   LIPASE  --   --   --   --   --   --  199    < > = values in this interval not displayed.     No results found for this or any previous visit (from the past 24 hour(s)).

## 2021-07-05 NOTE — PROGRESS NOTES
Nephrology Progress Note  07/05/2021     Dax Villareal is a 30 year old male with h/o ETOH hepatitis, end stage liver disease, RADHA on HD, transferred from [facility] in [place] for septic shock on 6/28/21 for treatment of necrotizing pancreatitis and decompensated liver disease. He was initially admitted to [facility] 5/19/21.       Assessment & Recommendations:     1 RADHA - Remains dialysis dependent/anuric.   Last HD prior to hospital admission was 6/26/21. He has not had any OP HD at this time. Has been receiving inpatient HD at [facility] prior to transfer to [facility] on 6/28/21   - Etiology of RADHA felt to be JORGE L, abx, pancreatitis, sepsis, HRS   - Has Right TDC   - Will continue MWF schedule.  Will attempt HD today.  Patient hypotensive on arrival to HD.   If we cannot improve his BP with IVF and Albumin -> will not be able to do HD.      - No sign of renal recovery as yet.  Patient remains anuric     2. ETOH hepatitis/ recurrent ascites, HE - Patient with heavy ETOH use. Last ETOH unknown. Currently on Lactulose/Rifax, thiamine, Folate.    - Per GI     5- Overall prognosis is very poor if Mr Villareal is not a liver transplant candidate.  I have relayed this information to Mr Villareal's brother yesterday.  He appears to be developing worse sepsis.  Discussed prognosis with Dr Harkins.   Discussion regarding goals of care was initiated with family.    If we are unable to provide intermittent HD today - > the next step would be to escalate to ICU -> CRRT which I do not think is in line with his overall prognosis      Recommendations were communicated to primary team via progress note    Ladarius Reid MD  Division of Nephrology and Hypertension  Pager: [phone]  July 5, 2021  10:14 AM          Interval History : July 5, 2021  Nursing and provider notes from last 24 hours reviewed.  Patient is very lethargic today.  I could only get him to open his eyes.  No verbal response  No urine output recorded  Large emesis overnight.   His  "recorded Intake and Output have been negative the last 2 days.  Developing worsening hypotension this am.  Received HD 7/2/21     Review of Systems:   I reviewed the following systems:  Unreliable given encephalopathy    Physical Exam:   I/O last 3 completed shifts:  In: 410 [P.O.:240; I.V.:20; Other:60; NG/GT:90]  Out: 753 [Emesis/NG output:50; Drains:203; Stool:500]   /65 (BP Location: Right arm)   Pulse 115   Temp 97.8  F (36.6  C) (Oral)   Resp 17   Ht 1.676 m (5' 6\")   Wt 74.4 kg (164 lb)   SpO2 97%   BMI 26.47 kg/m       GENERAL APPEARANCE:   EYES:  scleral icterus, pupils equal  Pulmonary: lungs clear to auscultation.    CV: tachy.  No rub heard   - Edema - no LE edema  GI: large distended,   MS: no evidence of inflammation in joints, no muscle tenderness  SKIN: no rash, warm, dry, no cyanosis, marked jaundice  NEURO: very lethargic.    ACCESS: Right TDC     Labs:   All labs reviewed by me  Electrolytes/Renal -   Recent Labs   Lab Test 07/04/21  1413 07/04/21  0609 07/03/21  0603 07/02/21  0505 06/30/21  0445 06/30/21  0445 06/29/21  0410   NA  --  144 137 137   < > 138 138   POTASSIUM 3.3* 2.8* 3.3* 3.3*   < > 3.1* 3.3*   CHLORIDE  --  111* 104 107   < > 104 103   CO2  --  21 23 20   < > 22 25   BUN  --  35* 23 35*   < > 34* 20   CR  --  3.43* 2.66* 3.28*   < > 3.02* 2.25*   GLC  --  121* 94 113*   < > 130* 112*   JANENE  --  8.6 8.4* 8.2*   < > 8.4* 7.9*   MAG  --  2.1  --   --   --  2.4* 2.0   PHOS  --   --   --   --   --  5.3* 2.3*    < > = values in this interval not displayed.       CBC -   Recent Labs   Lab Test 07/04/21  0609 07/03/21 2114 07/03/21  0603 07/02/21  0505   WBC 33.8*  --  35.2* 38.7*   HGB 7.8* 7.6* 6.7* 7.0*     --  147* 184       LFTs -   Recent Labs   Lab Test 07/04/21  0609 07/03/21  0603 07/02/21  0505   ALKPHOS 169* 151* 144   BILITOTAL 24.1* 22.4* 21.0*   ALT 22 23 26   AST 84* 83* 80*   PROTTOTAL 7.1 6.9 6.8   ALBUMIN 3.3* 2.9* 2.4*       Iron Panel - No lab " results found.      Imaging:      Current Medications:    folic acid  1 mg Oral Daily    Or     folic acid  1 mg Intravenous Daily     lactulose  20 g Oral or NG Tube TID     melatonin  6 mg Oral At Bedtime     meropenem  500 mg Intravenous Q24H     pantoprazole  40 mg Oral QAM AC     potassium chloride  20 mEq Oral BID     rifaximin  550 mg Oral BID     sodium chloride (PF)  10 mL Irrigation Q8H     sodium chloride (PF)  3 mL Intracatheter Q8H     vitamin B1  100 mg Oral Daily    Or     thiamine  100 mg Intravenous Daily       - MEDICATION INSTRUCTIONS -       Ladarius Reid MD

## 2021-07-05 NOTE — PLAN OF CARE
Neuro: A&Ox1, inconsistently following commands. On stage 1 [place] protocol, only slightly improved symptoms following. Continuing w/ protocol.  Cardiac: ST on tele. VSS.   Respiratory: Sating 95-97% on RA. Denies SOB.  GI/: Anuric, on hemodialysis via R CVC. Consistent diarrhea w/ lactulose on board. C/o nausea once, resolved w/ IV zofran x1.  Diet/appetite: Tolerating Dd3 diet, sipping fluids overnight.  Activity:  Up with lift.  Pain: At acceptable level on current regimen. Denies pain.  Skin: No new deficits noted. See flowsheets.  LDA's: L triple internal jugular WDL, infusing ATB/TKO. R CVC HD line wdl.    Plan: Continue with POC. Notify primary team with changes.

## 2021-07-05 NOTE — PROGRESS NOTES
HEMODIALYSIS TREATMENT NOTE    Date: 7/5/2021  Time: 11:04 AM    Data:  Pre Wt: 74.4 kg (173 lb 11.6 oz)   Desired Wt: 74.4kg   Post Wt: 74.4kg (171 lb 11.8 oz)  Weight change: 0 kg  Ultrafiltration - Post Run Net Total Removed (mL): 0 mL  Vascular Access Status: patent  Dialyzer Rinse: Streaked  Total Blood Volume Processed: 62 Liters  Total Dialysis (Treatment) Time: 3 Hours    Lab:   no    Interventions:Assessments  Pt dialyzed today for 3hrs on a K-4 Ca-3 bath via RCVC. 350Qb throughout. No UF d/t hypotension .25g 25% Albumin given. Pt very lethargic and confused. Seen by multiple teams during run. CVC dressing changed today.  See Epic for VS details. Report to PCN post run.   Plan:    Per renal

## 2021-07-05 NOTE — PROGRESS NOTES
BRIEF GI NOTE  Patient unable to be seen today as in dialysis most of the day and currently  Not in his room.     CT abd/pelvis reviewed from yesterday. Significant decrease in collection size following percutaneous drain placement. What remains adjacent to the stomach appears to be hyperdense clot/hemorrhage material. May not be easily endoscopically drainable now. He was overall improving over the weekend but today had an RRT for an elevated lactate and soft pressures.   Will need to monitor. If he isn't improving, then would favor attempting endoscopic drainage this week to facilitate debridement.   Repeat paracentesis results pending. Lipase added on to fluid studies to rule out PD leak contributing.      Jose Quigley MD  [facility]  Division of Gastroenterology and Hepatology  [facility] [address] [place], [place] [address]

## 2021-07-05 NOTE — PROGRESS NOTES
Pt triggered sepsis protocol. Lactic acid result was 2.4 Rapid response called. Vitals BPs /40-50. Telem  's.  Temp 98.8 Ax. resp 22. O2 sats 97% on 1 L per NC. A & O X 2-3. Rapid team here to assess pt. New orders for albumin IV and labs at this time. Inc X 2 of BM. C/O pain in lower back. Mattress changed to pulsate. Pt repoistioned for comfort. Encourage oral intake when awake. Denies nausea at this time. Will continue monitor and notify care team with any changes.

## 2021-07-05 NOTE — PROGRESS NOTES
"Rapid Response Team Note    Assessment   In assessment a rapid response was called on Dax Villareal due to Hyperlactatemia w/ LA 2.4 (1.4)  This presentation is likely due to Etoh cirrhosis, SBP, ESRD on HD.    Plan   -  Patient w/ soft BP's- SBPs 80-90's and tachycardia to 120's (persistent since admission). Complains of \"all over pain\". No fever. Leukocytosis related to Eoth hepatitis- down trending. Hgb stable this AM at 7.8. Also w/  Necrotizing pancreatitis w/ perc drain- dark brown/maroon output.   - Will give 50g of 25% albumin x1; obtain CBC to monitor Hgb  - Repeat LA at 2100    -  The Internal Medicine primary team was able to be reached and they are in agreement with the above plan.  -  Disposition: The patient will remain on the current unit. We will continue to monitor this patient closely.  -  Reassessment and plan follow-up will be performed by the primary team      Raegan Virk PA-C  [facility] RRT AMCOM Job Code Contact #[phone]    Hospital Course   Brief Summary of events leading to rapid response:   Dax Villareal is a 31 y/o M w/ PMH of Etoh abuse, ESLD c/b HE, ARF on HD, AHRF and septic shock in setting of necrotizing pancreatitis, who presented in transfer from OSH following prolonged hospitalization for acute etoh hepatitis. Patient w/ SBP on Merrem and continues on HD. RRT called for LA of 2.4. Patient w/ complaints of \"all over pain\". He is oriented x3. HD today, though unable to pull d/t soft BPs. No fever or abdominal pain. We discussed POC as outlined above and patient, ICU and bedside RN agreeable.     Admission Diagnosis:   Pancreatitis [K85.90]     Physical Exam   Temp: 98.8  F (37.1  C) Temp  Min: 97.6  F (36.4  C)  Max: 98.8  F (37.1  C)  Resp: 20 Resp  Min: 13  Max: 35  SpO2: 97 % SpO2  Min: 91 %  Max: 99 %  Pulse: 125 Pulse  Min: 111  Max: 130    No data recorded  BP: (!) 96/39 Systolic (24hrs), Av , Min:86 , Max:120   Diastolic (24hrs), Av, Min:39, Max:104     I/Os: " I/O last 3 completed shifts:  In: 390 [I.V.:30; Other:60; NG/GT:300]  Out: 1233 [Emesis/NG output:950; Drains:283]     Exam:   General: chronically ill appearing  Mental Status: Oriented x3.  Resp: Breathing non-labored on 1L O2  CV: Tachycardia though no appreciable M/R/G  GI: BS+, no rebound or guarding  Skin: Jaundice    Significant Results and Procedures   Lactic Acid:   Recent Labs   Lab Test 07/05/21  1644 07/04/21  1538 07/03/21  1513 06/28/21  1839 06/28/21  1839 05/18/21  1220 12/09/20  1837   LACT  --   --   --   --  1.9 7.7* 1.9   LACTS 2.4* 1.4 1.8   < >  --   --   --     < > = values in this interval not displayed.     CBC:   Recent Labs   Lab Test 07/05/21  0625 07/04/21  0609 07/03/21  2114 07/03/21  0603   WBC 32.7* 33.8*  --  35.2*   HGB 7.9* 7.8* 7.6* 6.7*   HCT 25.0* 24.0*  --  21.1*    184  --  147*        Sepsis Evaluation   The patient is known to have an infection.  Dax Villareal meets SIRS criteria AND has a lactate >2 or other evidence of acute organ damage.  These vital signs, lab and physical exam findings constitute a diagnosis of SEVERE SEPSIS.    Sepsis Time-Zero (time severe sepsis diagnosis confirmed):   07/05/21 as this was the time when Lactate resulted, and the level was > 2.0     Anti-infectives (From now, onward)    Start     Dose/Rate Route Frequency Ordered Stop    07/05/21 0830  rifaximin (XIFAXAN) oral suspension 550 mg      550 mg Oral 2 TIMES DAILY 07/05/21 0757      06/29/21 0230  meropenem (MERREM) 500 mg vial to attach to  mL bag for ADULTS or 25 mL bag for PEDS      500 mg  over 30 Minutes Intravenous EVERY 24 HOURS 06/29/21 0113          Current antibiotic coverage is appropriate for source of infection.    3 Hour Severe Sepsis Bundle Completion:  1. Initial Lactic Acid result shown above. Repeat lactic acid ordered for 2 hours from now.   2. Blood Cultures before Antibiotics: Yes  3. Broad Spectrum Antibiotics Administered: yes  4. Fluids: 50g 25% albumin  mL fluids ORDERED to be given

## 2021-07-05 NOTE — CONSULTS
Consult and Procedure Service - Procedure Note    Attending: Gaurav Russell MD   Indication: Evaluation of SBP  Risk Assessment: Higher, given patient medical condition  Pre-procedure diagnosis: ascites  Post-procedure diagnosis: ascites  Procedure name: Diagnostic paracentesis    The risks and benefits of the procedure were explained to the patient who expressed understanding and opted to proceed.  Consent was obtained and placed in the chart.  A time out was performed.  An area of ascites was located with ultrasound and marked in the left lower quadrant; the area was prepped and draped in the usual sterile fashion.  5 ml of 1% lidocaine was instilled with a 22 gauge needle and ascites located under real-time guidance. 7 ml of bloody, cloudy colored fluid was removed and sent for analysis and the area dressed.     Patient tolerated the procedure well. Please contact the Consult and Procedure Service if any complications or concerns arise.     Gaurav Russell MD   DOS:  07/05/21

## 2021-07-05 NOTE — PROGRESS NOTES
CLINICAL NUTRITION SERVICES - REASSESSMENT NOTE     Nutrition Prescription    RECOMMENDATIONS FOR MDs/PROVIDERS TO ORDER:  Please page RD if plan is to initiate enteral nutrition (RD has active order)     Malnutrition Status:    Severe malnutrition in the context of chronic illness     Recommendations already ordered by Registered Dietitian (RD):  Continue ensure clear BID (additional PRN)     Future/Additional Recommendations:  Due to current distention/need for suction prior to receiving medications- patient may need placement of post pyloric feeding tube.   Enteral Nutrition recommendations:   Semi-elemental formula: Vital 1.5 Leno @ goal of  65ml/hr (1560ml/day)  will provide: 2340 kcals (33 kcal/kg), 105 g PRO (~1.5), 1191 ml free H20, 291 g CHO, and 9 g fiber daily, 89 g fat, 88 mEq K+, 1951 mg Po4     Renal Formula pending function: NovaSource Renal @ 50 mL/hr (1200 mL/day) to provide 2400 kcals (34 kcal/kg/day), 109 g PRO (~1.5 g/kg/day), 864 mL H2O, 220 CHO and 0 gm Fiber daily, 120 g fat, 29 mEq K+, 982 mg Po4.  Recommend obtaining fecal elastase to assess for pancreatic exocrine function and need for enzymes with tube feeding      EVALUATION OF THE PROGRESS TOWARD GOALS   Diet: Dysphagia Level 3:  Advanced + ensure clear with meals   Intake: 0-25%, now with vomiting per nurse after meds, NGT placed on 7/4.      NEW FINDINGS   CT abdomen/pelvis 7/4 for evaluation/comparison from 6/29.     Weight Trends:  07/06/21 0252 69.5 kg (153 lb 3.5 oz)    07/05/21 0830 74.4 kg (164 lb 0.4 oz)   07/04/21 0035 77.2 kg (170 lb 3.2 oz)   07/03/21 0400 78 kg (172 lb)   07/01/21 0400 75.7 kg (166 lb 14.2 oz)   06/30/21 1510 77.9 kg (171 lb 11.8 oz)   06/29/21 0400 78.9 kg (173 lb 15.1 oz)   06/28/21 1815 80.2 kg (176 lb 12.9 oz)- admission     Dosing weight Revised 69.5 kg (lowest weight)  Estimated Energy Needs: 0665-9916 kcals/day (30- 35+ kcals/kg)  Justification: hypermetabolism with pancreatitis/HD and liver  disease  Estimated Protein Needs: 104-139 g/day (1.5 - 2 g/kg)  Justification: pancreatitis and Hypercatabolism with critical illness  Estimated Fluid Needs: Per provider pending fluid status     GI: On lactulose, diarrhea noted/multiple stools daily. Nurse reported suctioning NG prior to giving meds so patient will tolerate. Per I/O, 1050 ml output of NG yesterday  Neuro: Lethargic and confused per nursing notes/writer assessment.   Renal: On HD, no sign of renal recovery yet per nephrology   Skin: Jaundiced. No new deficits   Labs: Na 139 (WNL), K+ 3.1 (L), Creatinine 2.91 (H)-->4.74 (H, 7/5), Phos 5.3 (6/30/21)  Medications: Folic acid, Lactulose, Protonix, Potassium chloride, Thiamine    MALNUTRITION  % Intake: </= 50% for >/= 5 days (severe)  % Weight Loss: > 2% in 1 week (severe)  Subcutaneous Fat Loss: Facial region:  mild  Muscle Loss: Scapular bone: moderate, Thoracic region (clavicle, acromium bone, deltoid, trapezius, pectoral): moderate, Upper arm (bicep, tricep): moderate, Dorsal hand: mild, Upper leg (quadricep, hamstring): moderate and Posterior calf: moderate, per assessment 6/30, unable to reassess on visit due to   Fluid Accumulation/Edema: Does not meet criteria  Malnutrition Diagnosis: Severe malnutrition in the context of acute on chronic illness     Previous Goals   Total avg nutritional intake to meet a minimum of 25 kcal/kg and 1.5g PRO/kg daily (per dosing wt 68 kg).  Evaluation: Not met    Previous Nutrition Diagnosis  Inadequate protein-energy intake related to anorexia, EtOH abuse, AMS inhibiting ability to adequately take PO as evidenced by pt with 6.5% weight loss over the past 6 months, NPO currently, necrotizing pancreatitis found, and pt with muscle wasting c/w severe malnutrition.    Evaluation: Modified     CURRENT NUTRITION DIAGNOSIS  Inadequate protein/energy intake related to increased metabolic demand, AMS, and altered GI function as evidenced by energy needs 30-35 kcal/kg and  1.5-2 g/kg protein and 13% weight loss in one week (partially fluid related); confusion and lethargy inhibiting PO (0-25% intakes) and placement of NG tube to intermittent suction (1050 ml out yesterday).     INTERVENTIONS  Implementation  Collaboration with other nutrition professionals  Collaboration with other providers  --Paged nephrology regarding if team has preference for renal formula (suspect non-renal formula will be better tolerated)   --Discussed with primary team patient's lack of nutrition, tube needing likely not tolerated with current access  --Plan for discussion with family   Enteral Nutrition - recommendations     Goals  Patient to consume 50-75% of nutritionally adequate meals TID vs initiation of nutrition support within 2-3 days     Monitoring/Evaluation  Progress toward goals will be monitored and evaluated per protocol.    Jennifer Castillo RD, MARYELLEN  6B pager: [phone]

## 2021-07-06 ENCOUNTER — APPOINTMENT (OUTPATIENT)
Dept: SPEECH THERAPY | Facility: CLINIC | Age: 31
End: 2021-07-06
Attending: INTERNAL MEDICINE
Payer: MEDICAID

## 2021-07-06 ENCOUNTER — APPOINTMENT (OUTPATIENT)
Dept: PHYSICAL THERAPY | Facility: CLINIC | Age: 31
End: 2021-07-06
Attending: INTERNAL MEDICINE
Payer: MEDICAID

## 2021-07-06 LAB
ALBUMIN SERPL-MCNC: 4.1 G/DL (ref 3.4–5)
ALP SERPL-CCNC: 150 U/L (ref 40–150)
ALT SERPL W P-5'-P-CCNC: 20 U/L (ref 0–70)
ANION GAP SERPL CALCULATED.3IONS-SCNC: 9 MMOL/L (ref 3–14)
AST SERPL W P-5'-P-CCNC: 97 U/L (ref 0–45)
BILIRUB SERPL-MCNC: 24.4 MG/DL (ref 0.2–1.3)
BUN SERPL-MCNC: 32 MG/DL (ref 7–30)
CALCIUM SERPL-MCNC: 9.1 MG/DL (ref 8.5–10.1)
CHLORIDE SERPL-SCNC: 107 MMOL/L (ref 94–109)
CO2 SERPL-SCNC: 23 MMOL/L (ref 20–32)
CREAT SERPL-MCNC: 2.91 MG/DL (ref 0.66–1.25)
ERYTHROCYTE [DISTWIDTH] IN BLOOD BY AUTOMATED COUNT: ABNORMAL % (ref 10–15)
GFR SERPL CREATININE-BSD FRML MDRD: 27 ML/MIN/{1.73_M2}
GLUCOSE SERPL-MCNC: 132 MG/DL (ref 70–99)
HCT VFR BLD AUTO: 23.3 % (ref 40–53)
HGB BLD-MCNC: 7.5 G/DL (ref 13.3–17.7)
LABORATORY COMMENT REPORT: NORMAL
LACTATE BLD-SCNC: 1.5 MMOL/L (ref 0.7–2)
MCH RBC QN AUTO: 32.3 PG (ref 26.5–33)
MCHC RBC AUTO-ENTMCNC: 32.2 G/DL (ref 31.5–36.5)
MCV RBC AUTO: 100 FL (ref 78–100)
PLATELET # BLD AUTO: 201 10E9/L (ref 150–450)
POTASSIUM SERPL-SCNC: 3.1 MMOL/L (ref 3.4–5.3)
PROT SERPL-MCNC: 8.8 G/DL (ref 6.8–8.8)
RBC # BLD AUTO: 2.32 10E12/L (ref 4.4–5.9)
SARS-COV-2 RNA RESP QL NAA+PROBE: NEGATIVE
SODIUM SERPL-SCNC: 139 MMOL/L (ref 133–144)
SPECIMEN SOURCE: NORMAL
WBC # BLD AUTO: 28.6 10E9/L (ref 4–11)

## 2021-07-06 PROCEDURE — 250N000013 HC RX MED GY IP 250 OP 250 PS 637: Performed by: STUDENT IN AN ORGANIZED HEALTH CARE EDUCATION/TRAINING PROGRAM

## 2021-07-06 PROCEDURE — 80053 COMPREHEN METABOLIC PANEL: CPT | Performed by: STUDENT IN AN ORGANIZED HEALTH CARE EDUCATION/TRAINING PROGRAM

## 2021-07-06 PROCEDURE — 99233 SBSQ HOSP IP/OBS HIGH 50: CPT | Performed by: PHYSICIAN ASSISTANT

## 2021-07-06 PROCEDURE — 85027 COMPLETE CBC AUTOMATED: CPT | Performed by: STUDENT IN AN ORGANIZED HEALTH CARE EDUCATION/TRAINING PROGRAM

## 2021-07-06 PROCEDURE — 36592 COLLECT BLOOD FROM PICC: CPT | Performed by: INTERNAL MEDICINE

## 2021-07-06 PROCEDURE — 99233 SBSQ HOSP IP/OBS HIGH 50: CPT | Performed by: INTERNAL MEDICINE

## 2021-07-06 PROCEDURE — 250N000013 HC RX MED GY IP 250 OP 250 PS 637

## 2021-07-06 PROCEDURE — 83605 ASSAY OF LACTIC ACID: CPT | Performed by: INTERNAL MEDICINE

## 2021-07-06 PROCEDURE — 250N000013 HC RX MED GY IP 250 OP 250 PS 637: Performed by: INTERNAL MEDICINE

## 2021-07-06 PROCEDURE — 36592 COLLECT BLOOD FROM PICC: CPT | Performed by: STUDENT IN AN ORGANIZED HEALTH CARE EDUCATION/TRAINING PROGRAM

## 2021-07-06 PROCEDURE — 120N000005 HC R&B MS OVERFLOW UMMC

## 2021-07-06 PROCEDURE — 250N000009 HC RX 250

## 2021-07-06 PROCEDURE — 99207 PR NON-BILLABLE SERV PER CHARTING: CPT | Performed by: INTERNAL MEDICINE

## 2021-07-06 PROCEDURE — 250N000011 HC RX IP 250 OP 636: Performed by: STUDENT IN AN ORGANIZED HEALTH CARE EDUCATION/TRAINING PROGRAM

## 2021-07-06 PROCEDURE — 92526 ORAL FUNCTION THERAPY: CPT | Mod: GN

## 2021-07-06 PROCEDURE — 97530 THERAPEUTIC ACTIVITIES: CPT | Mod: GP

## 2021-07-06 PROCEDURE — 99233 SBSQ HOSP IP/OBS HIGH 50: CPT

## 2021-07-06 PROCEDURE — U0005 INFEC AGEN DETEC AMPLI PROBE: HCPCS | Performed by: INTERNAL MEDICINE

## 2021-07-06 PROCEDURE — U0003 INFECTIOUS AGENT DETECTION BY NUCLEIC ACID (DNA OR RNA); SEVERE ACUTE RESPIRATORY SYNDROME CORONAVIRUS 2 (SARS-COV-2) (CORONAVIRUS DISEASE [COVID-19]), AMPLIFIED PROBE TECHNIQUE, MAKING USE OF HIGH THROUGHPUT TECHNOLOGIES AS DESCRIBED BY CMS-2020-01-R: HCPCS | Performed by: INTERNAL MEDICINE

## 2021-07-06 RX ORDER — ACETAMINOPHEN 325 MG/10.15ML
325 LIQUID ORAL 2 TIMES DAILY PRN
Status: DISCONTINUED | OUTPATIENT
Start: 2021-07-06 | End: 2021-07-10

## 2021-07-06 RX ORDER — HYDROMORPHONE HYDROCHLORIDE 1 MG/ML
0.5 INJECTION, SOLUTION INTRAMUSCULAR; INTRAVENOUS; SUBCUTANEOUS EVERY 4 HOURS PRN
Status: DISCONTINUED | OUTPATIENT
Start: 2021-07-06 | End: 2021-07-10

## 2021-07-06 RX ADMIN — LACTULOSE 20 G: 10 SOLUTION ORAL; RECTAL at 16:59

## 2021-07-06 RX ADMIN — POTASSIUM CHLORIDE 20 MEQ: 1.5 POWDER, FOR SOLUTION ORAL at 07:58

## 2021-07-06 RX ADMIN — LACTULOSE 20 G: 10 SOLUTION ORAL at 20:42

## 2021-07-06 RX ADMIN — FOLIC ACID 1 MG: 1 TABLET ORAL at 07:58

## 2021-07-06 RX ADMIN — THIAMINE HCL TAB 100 MG 100 MG: 100 TAB at 07:58

## 2021-07-06 RX ADMIN — Medication 40 MG: at 07:58

## 2021-07-06 RX ADMIN — ACETAMINOPHEN 325 MG: 325 SOLUTION ORAL at 12:23

## 2021-07-06 RX ADMIN — LACTULOSE 20 G: 10 SOLUTION ORAL at 07:58

## 2021-07-06 RX ADMIN — Medication 550 MG: at 07:58

## 2021-07-06 RX ADMIN — MEROPENEM 500 MG: 500 INJECTION, POWDER, FOR SOLUTION INTRAVENOUS at 20:47

## 2021-07-06 RX ADMIN — LACTULOSE 20 G: 10 SOLUTION ORAL at 13:50

## 2021-07-06 RX ADMIN — POTASSIUM CHLORIDE 20 MEQ: 1.5 POWDER, FOR SOLUTION ORAL at 20:42

## 2021-07-06 RX ADMIN — Medication 550 MG: at 20:47

## 2021-07-06 ASSESSMENT — MIFFLIN-ST. JEOR: SCORE: 1592.75

## 2021-07-06 ASSESSMENT — ACTIVITIES OF DAILY LIVING (ADL)
ADLS_ACUITY_SCORE: 22

## 2021-07-06 NOTE — PLAN OF CARE
Neuro: A. Follows commands, answers some questions appropriately. Unable to answer year, able to say he is in hospital. ADEEL.    Cardiac: ST 100s. BP soft but MAPs WNL. Afebrile.   Respiratory: Sating 98 on 2L Lungs clear, diminished.  GI/: Unable to assess urine op, may have been mixed with liquid stool. Bms watery/loose, brown with red streaks. Denies nausea this shift. NG in place for meds, PRN suction.  Diet/appetite: Dd3 diet with thin liquids. Able to chew ice chips, sometimes coughs after drinking.   Activity:  Up with lift, turning schedule.  Pain: At acceptable level on current regimen.   Skin: No new deficits noted. Pulsate mattress.  LDA's: PICC. L flank drain. NG.    Plan: Left flank RP drain with minimal maroon/brown OP. K 3.1 with AM labs; per ronald night team, will not add potassium replacement at this time. Continue with POC. Notify primary team with changes.

## 2021-07-06 NOTE — PLAN OF CARE
A: A&Ox2-3, pt able to say he is in hospital but does not know why, intermittently off on date/time. Blair monitored, pt on scheduled lactulose, titration of prn lactulose per stool, mentation improving. VS unchanged on room air sating >92%. Sinus tach 100-120. Afebrile. Intermittent back pain managed with repositioning Q2H or ice/heat. Denies nausea. DD3 diet with thin liquids, ordering food for patient and helping encourage PO intake. No replacements for electrolytes ordered. NG for meds. L perc drain flushed, see flow sheet for I&O's. Incontinent urine and stool. No new skin deficits noted. Pt able to make needs known, frequent rounding utilized due to pt intermittently confused on using call light.    Plan for leonides counts tomorrow, continue to encourage PO intake, pt still refusing TF via NG.     I/O this shift:  In: 220 [P.O.:120; Other:10; NG/GT:90]  Out: 50 [Drains:50]    Temp:  [97.7  F (36.5  C)-99.2  F (37.3  C)] 98.3  F (36.8  C)  Pulse:  [110-130] 115  Resp:  [13-24] 18  BP: ()/() 102/57  SpO2:  [92 %-99 %] 99 %     R: Continue with POC. Notify primary team with changes.

## 2021-07-06 NOTE — PROGRESS NOTES
GASTROENTEROLOGY PROGRESS NOTE    Date of Admission: 6/28/2021  Reason for Admission: pancreatitis/hepatitis      ASSESSMENT/RECOMMENDATIONS:  30 year old male with a history of heavy alcohol use who presented to OSH 5/19 for abdominal pain, nausea and vomiting, admitted for alcohol hepatitis, ESLD, RADHA on HD, septic shock requiring pressors as well as necrotizing pancreatitis with large evolving necrotic collection. Transferred to [facility] for consideration of drainage of presumed infected necrotic collection     #. Acute necrotizing pancreatitis with presumed infected walled off necrotic collection  #. Septic shock  #. Leukocytosis  #. Peritonitis  Unclear evolution of pancreatitis history but had a recent CT scan with large evolving necrotic collection, presumed infected. No obvious gas in collection. CT on admission to [facility] showing mature collection with enhancing wall measuring [id number]. Unfortunately he has profound coagulopathy (liver disease + malnutrition +sepsis), INR 3.8 on transfer so was given FFP and Vit K and went for IR perc drain am of 6/29. He may eventually warrant endoscopic drainage at some point however repeat imaging (7/4) shows that collection is largely decompressed and no great window at this point.  Etiology: alcohol  Date of onset: 5/19  BISAP score on admission: unclear  Concurrent organ failure: respiratory (now extubated), renal (on HD), liver, cardiogenic (weaned off pressors 6/29)  Thromboses: none  Diabetes: no  Current nutrition access: NJT placed 6/30 but patient removed it shortly after (refused replacement)  Last imaging: CT scan with IV contrast 7/4  Infection/antiinfectives:                 - Meropenem (6/29 - present)                - Vancomycin (6/29 - 6/30)     - Micafungin (6/29 - 7/1)   Intervention:    6/30 - Left IR RP perc drain (24F)    7/2 - Diagnostic paracentesis (WBC 2663 - 74% PMN, SAAG <1.1, protein 4.1)     7/5 - Repeat paracentesis (WBC 1801 - 61% PMN,  "lipase 48)    -Drain management per IR, await cultures from drain output  -Advance diet, calorie counts  -Ideally would replace NJFT vs PEG-J placement  -Continue antibiotics, follow cultures     #. Alcohol hepatitis  #. Hyperbilirubinemia  #. Ascites  #. Coagulopathy/Thrombocytopenia  #. Encephalopathy  Known heavy alcohol use. Reportedly received course of steroids at OSH for 1 week with \"some improvement\". Currently has primarily hyperbilirubinemia and mildly elevated AST. Unclear if he has underlying cirrhosis but certainly would fit with alcohol hepatitis vs elevated liver tests of critical illness/sepsis. Not candidate for steroids right now. He is receiving lactulose and Xifaxin for encephalopathy. Hepatology notified of admission, ongoing discussions with them.    -Trend MELD labs  -Continue lactulose and titrate to 3 soft stools per day (no more than 1L)  -Alcohol abstinence    The patient was discussed and plan agreed upon with GI staff, Dr Quigley.      Brianna Norwood PA-C  Advanced Endoscopy/Pancreaticobiliary GI Service  [facility]  Text Page  _______________________________________________________________      Subjective: Nursing notes and 24hr events reviewed. Patient seen and examined at 0915. Denies abdominal pain, main complaint is of neck and back pain. Declines replacement of NJFT, states that he is eating but it takes a long time for the food to come from the kitchen.    ROS:   4 pt ROS negative unless noted in subjective.     Objective:  Blood pressure 102/57, pulse 115, temperature 98.3  F (36.8  C), temperature source Axillary, resp. rate 18, height 1.676 m (5' 6\"), weight 69.5 kg (153 lb 3.5 oz), SpO2 99 %.  Gen: Laying in bed. Appears comfortable  HEENT: NCAT. Conjunctiva clear. Sclera ++icteric  CV: tachycardic, Peripheral perfusion intact  Resp: non labored breathing  Abd: minimal distention, not tender, dark red/brown output in perc drain  Msk: no gross " deformity  Skin:  ++ jaundice  Ext: warm, well perfused  Neuro: no asterixis but generally tremulous  Mental status/Psych: alert and oriented, flat affect and minimally interactive      Date 07/01/21 0700 - 07/02/21 0659   Shift 3917-7616 0546-8664 1349-9557 24 Hour Total   INTAKE   I.V. 5   5   Enteral 0   0   Shift Total(mL/kg) 5(0.07)   5(0.07)   OUTPUT   Shift Total(mL/kg)       Weight (kg) 75.7 75.7 75.7 75.7       LABS:  BMP  Recent Labs   Lab 07/06/21  0515 07/05/21  0625 07/04/21  1413 07/04/21  0609 07/03/21  0603    148*  --  144 137   POTASSIUM 3.1* 3.1* 3.3* 2.8* 3.3*   CHLORIDE 107 116*  --  111* 104   JANENE 9.1 9.0  --  8.6 8.4*   CO2 23 18*  --  21 23   BUN 32* 52*  --  35* 23   CR 2.91* 4.74*  --  3.43* 2.66*   * 123*  --  121* 94     CBC  Recent Labs   Lab 07/06/21 0515 07/05/21  1809 07/05/21  0625 07/04/21  0609   WBC 28.6* 29.4* 32.7* 33.8*   RBC 2.32* 2.32* 2.48* 2.41*   HGB 7.5* 7.4* 7.9* 7.8*   HCT 23.3* 23.2* 25.0* 24.0*    100 101* 100   MCH 32.3 31.9 31.9 32.4   MCHC 32.2 31.9 31.6 32.5   RDW Dimorphic population - unable to calculate Dimorphic population - unable to calculate 27.4* 27.6*    180 211 184     INR  Recent Labs   Lab 07/05/21  0625 07/04/21  0609 07/03/21  0603 07/02/21  0505   INR 2.18* 2.11* 2.08* 2.24*     LFTs  Recent Labs   Lab 07/06/21 0515 07/05/21 0625 07/04/21  0609 07/03/21  0603   ALKPHOS 150 168* 169* 151*   AST 97* 78* 84* 83*   ALT 20 21 22 23   BILITOTAL 24.4* 24.7* 24.1* 22.4*   PROTTOTAL 8.8 7.8 7.1 6.9   ALBUMIN 4.1 3.4 3.3* 2.9*      PANC  No lab results found in last 7 days.      IMAGING:  CT abd/pelv without IV contrast 7/4  IMPRESSION:   1. Status post placement of a left lateral approach drainage catheter,  substantially decreased but persistent hemorrhagic walled off necrosis  centered in the lesser sac. A smaller collection along the inferior  aspect of the third portion of the duodenum is minimally changed.  2. Minimally  changed moderate to large volume loculated ascites.  3. Hepatomegaly and hepatic steatosis with evidence of cirrhosis and  portal hypertension.  4. Diffuse edematous wall thickening of the small and large bowel,  likely reactive. No bowel obstruction.  5. Small right and trace left pleural effusions with adjacent  compressive atelectasis, similar to prior. Small pericardial effusion.  6. Faint patchy ground glass opacities in the right middle lobe and  and right upper lobe, likely infectious/inflammatory.

## 2021-07-06 NOTE — PROGRESS NOTES
07/05/21 1700   Call Information   Date of Call 07/05/21   Time of Call 1649   Name of person requesting the team Rere CONKLIN   Title of person requesting team RN   RRT Arrival time 1652   Time RRT ended 1710   Reason for call   Type of RRT Adult   Primary reason for call Sepsis suspected   Sepsis Suspected Elevated Lactate level;Heart Rate > 100;WBC <4 or >12   Was patient transferred from the ED, ICU, or PACU within last 24 hours prior to RRT call? No   SBAR   Situation LA 2.4   Background Dax Villareal is a 30 year old male with hx of alcohol use disorder admitted on 6/28/2021 after recent prolonged admission at [facility] in [place] for acute alcoholic hepatitis and ESLD c/b hepatic encephalopathy, ARF requiring hemodialysis, acute respiratory failure and septic shock in the setting of acute necrotizing pancreatitis. His acute respiratory failure and septic shock have improved, but he requires continued management for ARF, hepatic encephalopathy, and new diagnosis of SBP.    Notable History/Conditions End-Stage disease;Organ failure   Assessment pt is soft spoken, oriented. c/o body aches and pain. Jaundice. 's. soft bp's 90's/30's. OVSS   Interventions Blood glucose;Labs;Meds  (albumin)   Patient Outcome   Patient Outcome Stabilized on unit   RRT Team   Attending/Primary/Covering Physician Daniela 3   Date Attending Physician notified 07/05/21   Time Attending Physician notified 1649   Physician(s) Raegan DONOVAN   Lead RN Jody RODGERS   Post RRT Intervention Assessment   Post RRT Assessment Stable/Improved   Date Follow Up Done 07/05/21   Time Follow Up Done 1930   Comments pt received albumin (25% 50g), bp 106/47, . LA recheck at 2100

## 2021-07-06 NOTE — PROGRESS NOTES
Nephrology Progress Note  07/06/2021     Dax Villareal is a 30 year old male with h/o ETOH hepatitis, end stage liver disease, RADHA on HD, transferred from [facility] in [place] for septic shock on 6/28/21 for treatment of necrotizing pancreatitis and decompensated liver disease. He was initially admitted to [facility] 5/19/21.       Assessment & Recommendations:     1 RADHA - Remains dialysis dependent/anuric.   Last HD prior to hospital admission was 6/26/21. He has not had any OP HD at this time. Has been receiving inpatient HD at [facility] prior to transfer to [facility] on 6/28/21   - Etiology of RADHA felt to be JORGE L, abx, pancreatitis, sepsis, HRS   - Has Right TDC   - Will continue MWF schedule   - CRRT/HD consent obtained from sister Noni per phone   - TB is 24.4 which would also contribute to bilirubin toxicity   - Weekend staff discussed poor prognosis with patient's brother over the weekend given that patient is not a liver transplant candidate.    - Goals of care conversation with family, ie sister George recommended   - Monitor for any recovery with daily chemistry labs/strict I/O and daily weights     2. Volume status - Hypervolemia with hypoxia/ascites, on supplemental oxygen. Has bilateral small pleural effusions at lung bases on CT 7/4..   Weight will be unreliable given large ascites. SBP ~ 100/ Intake 703 ml/Output: UO none, NG 1050 ml, Drain 270 ml.    - Really does not require any fluid remove on HD at this time given output. Unfortunately we are not able to correct pleural effusions   - Strict I/O please     3. CV - Shock 2/2 Sepsis: Echo on 6/10/21 showed EF of > 70% w/WMA. Off pressor support. SBP ~ 100/   - He would benefit from Midodrine    - Per ICU     4. ETOH hepatitis/ recurrent ascites, HE - Patient with heavy ETOH use. Last ETOH unknown. Currently on Lactulose/Rifax, thiamine, Folate. T bili 24.4   - Per GI     5. Electrolytes - Hypokalemia with stool output. K 3.1, Na 139.    - Started on KCL 20 meq  "bid by primary team.    - Will run on K 4 tomorrow     6.  Acid base - No acute concerns. Bicarb 23     7. BMD - Ca 9.1, Phos 5.3, albumin 4.1   - Check Phos in AM     8. Antimicrobials - Meropenem. Off Vanco. BC NTD.   WBC 28.6, Afebrile    Recommendations were communicated to primary team via progress note    Seen and discussed with Dr. Leigha Toledo, NP   [phone]    Interval History :   Nursing and provider notes from last 24 hours reviewed.  No fluid removed 7/5 due to hypotension  Remains anuric  Scheduled for routine HD tomorrow     Review of Systems:   I reviewed the following systems:  Resting    Physical Exam:   I/O last 3 completed shifts:  In: 593 [P.O.:220; I.V.:10; Other:30; NG/GT:133]  Out: 895 [Emesis/NG output:550; Drains:345]   BP 97/54 (BP Location: Right arm)   Pulse 118   Temp 98.8  F (37.1  C) (Oral)   Resp 18   Ht 1.676 m (5' 6\")   Wt 69.5 kg (153 lb 3.5 oz)   SpO2 97%   BMI 24.73 kg/m       GENERAL APPEARANCE: Resting, quiet.   EYES:  scleral icterus, pupils equal  HEENT: NGT present  Pulmonary: lungs clear to auscultation. On supplemental oxygen   CV: tachy   - Edema - no LE edema  GI: large distended, tender. Has retroperitoneal drain  MS: no evidence of inflammation in joints, no muscle tenderness  SKIN: no rash, warm, dry, no cyanosis  NEURO: calm  ACCESS: Right TDC     Labs:   All labs reviewed by me  Electrolytes/Renal -   Recent Labs   Lab Test 07/06/21  0515 07/05/21  0625 07/04/21  1413 07/04/21  0609 06/30/21  0445 06/30/21  0445 06/29/21  0410    148*  --  144   < > 138 138   POTASSIUM 3.1* 3.1* 3.3* 2.8*   < > 3.1* 3.3*   CHLORIDE 107 116*  --  111*   < > 104 103   CO2 23 18*  --  21   < > 22 25   BUN 32* 52*  --  35*   < > 34* 20   CR 2.91* 4.74*  --  3.43*   < > 3.02* 2.25*   * 123*  --  121*   < > 130* 112*   JANENE 9.1 9.0  --  8.6   < > 8.4* 7.9*   MAG  --  2.3  --  2.1  --  2.4* 2.0   PHOS  --  4.9*  --   --   --  5.3* 2.3*    < > = values in " this interval not displayed.       CBC -   Recent Labs   Lab Test 07/06/21  0515 07/05/21  1809 07/05/21  0625   WBC 28.6* 29.4* 32.7*   HGB 7.5* 7.4* 7.9*    180 211       LFTs -   Recent Labs   Lab Test 07/06/21  0515 07/05/21  0625 07/04/21  0609   ALKPHOS 150 168* 169*   BILITOTAL 24.4* 24.7* 24.1*   ALT 20 21 22   AST 97* 78* 84*   PROTTOTAL 8.8 7.8 7.1   ALBUMIN 4.1 3.4 3.3*       Iron Panel - No lab results found.      Imaging:      Current Medications:    folic acid  1 mg Oral Daily    Or     folic acid  1 mg Intravenous Daily     lactulose  20 g Oral or NG Tube TID     melatonin  6 mg Oral At Bedtime     meropenem  500 mg Intravenous Q24H     pantoprazole  40 mg Oral QAM AC     potassium chloride  20 mEq Oral BID     rifaximin  550 mg Oral BID     sodium chloride (PF)  10 mL Irrigation Q8H     sodium chloride (PF)  3 mL Intracatheter Q8H     vitamin B1  100 mg Oral Daily    Or     thiamine  100 mg Intravenous Daily       - MEDICATION INSTRUCTIONS -       Tory Toledo, NP

## 2021-07-06 NOTE — PROVIDER NOTIFICATION
Called and requested transfer orders due to patients improvement.  will touch base with Attending.

## 2021-07-06 NOTE — PROGRESS NOTES
Pt inc of loose stool throughout shift.  RN noticed fresh small blood clot with mucus in stool. Pt care team notified. No orders at this time. Will continue to monitor.

## 2021-07-06 NOTE — PROGRESS NOTES
Physician Attestation   I, Belen Falnagan, was present with the medical/NAH[employer] student who participated in the service and in the documentation of the note.  I have verified the history and personally performed the physical exam and medical decision making.  I agree with the assessment and plan of care as documented in the note.      I personally reviewed vital signs, medications, labs and imaging.      Belen Flanagan MD  Date of Service (when I saw the patient): 07/06/21    [facility]    Progress Note - [place]oon 3 Service        Date of Admission:  6/28/2021    Assessment & Plan   Dax Villareal is a 30 year old male with hx of alcohol use disorder admitted on 6/28/2021 after recent prolonged admission at [facility] for acute alcoholic hepatitis and ESLD c/b hepatic encephalopathy, ARF requiring hemodialysis, acute respiratory failure and septic shock in the setting of acute necrotizing pancreatitis. His acute respiratory failure and septic shock have improved, but he requires continued management for ARF, hepatic encephalopathy, and SBP.      Changes today:   - Lactulose currently administered via NG tube, monitor stool output   - Patient no longer has rectal tube. Monitor stool color and any additional blood clots in stool, may need to replace if not getting enough lactulose orally or via NG  - Pain management, especially for lower back pain, using tylenol 325mg BID PRN through NG tube and IV dilaudid 0.5mg q4h PRN; minimize as able given waxing and waning mental status   - Discussed enteral nutrition again with patient. Patient declined enteral nutrition, NJT placement, continue PO, ordered calorie counts to start tomorrow AM 7/7, If not eating enough tomorrow, will talk about goals of care with patient and family to determine next steps for nutrition.  - Order placed for transfer of patient from ICU stepdown unit to floor     #Necrotizing pancreatitis  w/ pseudocyst, improving  #SBP  #Profound leukocytosis, improving  Etiology of the septic shock that he came in with likely due to necrotizing pancreatitis +/- SBP. Had percutaneous drain placed for drainage of pancreatic collection. Diagnostic para returned with SBP, likely 2/2 pancreatitis (SAAG 0.4) but clouded by underlying liver disease. Repeat para completed 7/5. Per GI, if patient does not improve, would favor endoscopic drainage this week to facilitate debridement.   Patient developed lower back pain due to left flank RP drain, plan to give medication for pain as needed (Tylenol first, Dilaudid if necessary) and monitor for increased somnolence.   - Continue meropenem 500 mg q24h  - s/p Albumin x3 days   - Follow up cultures   - Daily CBC  - MAP goal > 65   - GI consulted, appreciate recs   - Tylenol 325mg, liquid BID PRN  - IV Dilaudid 0.5mg q4h PRN started 7/6- if patient becomes somnolent and is not able to communicate as before, decrease dose or switch medication     # Decompensated EtOH cirrhosis c/b hepatic encephalopathy and recurrent ascites  # Hx of Alcohol use disorder c/b withdrawal   # Anemia  # Coagulopathy   Patient with a history of marked alcohol use and alcoholic hepatitis in the past. Was given a course of steroids (1 week) during previous admission at OSH. Patient mental status declined again overnight, but has improved this morning and patient is able to communicate needs. Patient had one small blood clot in rectal tube overnight and his stool changed from brown to maroon-brown, hemoglobin stable compared to yesterday. Will monitor for signs of more bleeding in stool.  - Lactulose 20g TID and PRN and rifaximin 550mg BID (titrate to 1.5L stool/day)  - s/p 1 unit(s) pRBC 7/3  - Trend MELD labs  - Monitor hemoglobin, currently stable at 7.5  - Daily folic acid and thiamine  - Delirium precautions  - Melatonin 6mg at bedtime  - Avoid benzos and opioids as able  - Transfuse if hemoglobin < 7,  platelet < 10 or < 30 with bleeding  - Continue to monitor for signs of bleeding    MELD-Na score: 38 at 7/6/2021  5:15 AM  MELD score: 38 at 7/6/2021  5:15 AM  Calculated from:  Serum Creatinine: 2.91 mg/dL at 7/6/2021  5:15 AM  Serum Sodium: 139 mmol/L (Rounded to 137 mmol/L) at 7/6/2021  5:15 AM  Total Bilirubin: 24.4 mg/dL at 7/6/2021  5:15 AM  INR(ratio): 2.18 at 7/5/2021  6:25 AM  Age: 30 years 8 months    # ARF on HD (MWF)  # Hypervolemia causing acute respiratory failure, improving  - Nephrology consulted, recs appreciated     > Hemodialysis MWF              - Midodrine 10 mg PRN before dialysis sessions     > Daily chemistry labs, strict I/O, and daily weights  - Aspiration precautions  - Supplemental oxygen as needed to keep sats above 89%     # Severe malnutrition in setting of chronic illness  - Dysphagia level 3 diet with thin liquids  - Pt refusing NJT and enteral feeding; initiate calorie counts 7/6, if not meeting calorie counts (which I suspect he is not) will need to further discuss NJ vs PEG-J for enteral nutrition; ideally would have goals of care conversation first   - Continue multivitamins  - Pantoprazole 40 mg IV daily  - Zofran PRN     Diet: Snacks/Supplements Adult: Ensure Clear; With Meals  Dysphagia Diet Level 3 Advanced Thin Liquids (water, ice chips, juice, milk gelatin, ice cream, etc)    DVT Prophylaxis: Pneumatic Compression Devices  Rdz Catheter: Not present  Fluids: 5 mL NS TKO  Central Lines: PRESENT  PICC Triple Lumen 06/28/21 Left-Site Assessment: WDL  CVC Double Lumen Right-Site Assessment: WDL  Code Status: Full Code      Disposition Plan   Expected discharge: > 7 days, recommended to TCU once mental status at baseline, renal function improved and necrotizing pancreatitis is well managed.     The patient's care was discussed with the Attending Physician, Dr. Flanagan.    Jaime Amin, MS3  Medical Student  [address]  Center  Securely message with the [employer] Web Console (learn more here)  Text page via [facility] Paging/Directory  Please see sign in/sign out for up to date coverage information    I was present with the medical student who participated in the service and in the documentation of the note. I have verified the history and personally performed the physical exam and medical decision making. I agree with the assessment and plan of care as documented in the note.    Belen Flanagan MD  [facility] Hospitalist  Text Page    ______________________________________________________________________    Interval History   Per nursing notes, patient triggered sepsis protocol and was given 2x 50 mL 25% albumin, lactic came down from 2.4 to 2.0. Will continue to monitor any color changes or blood clots in stool.  No nausea or vomiting. Complains of back pain while sitting up, prefers to be lying down in bed. Eating ice chips.Is awake, alert, can answer questions about name, location, year.     Data reviewed today: I reviewed all medications, new labs and imaging results over the last 24 hours.     Physical Exam   Vital Signs: Temp: 98.8  F (37.1  C) Temp src: Oral BP: 97/54 Pulse: 118   Resp: 18 SpO2: 97 % O2 Device: None (Room air) Oxygen Delivery: 2 LPM  Weight: 153 lbs 3.52 oz    Gen: Lying down in bed, appears uncomfortable and sleepy  HEENT: Conjunctival icterus  CV: Tachycardic. Regular rhythm.   Resp: Breathing comfortably on RA  Abd: Soft, distended, tender over epigastric area.   Skin: Jaundiced  Back: Lower back pain due to left flank RP drain with maroon/brown OP, no tenderness around tube insertion site  Ext: Well perfused, no edema  Neuro: Mild asterixis. Oriented to person, place and year. Speech is easier to understand than before and he is able to communicate his needs well.     Data   Recent Labs   Lab 07/06/21  0515 07/05/21  1809 07/05/21  0625 07/04/21  1413 07/04/21  0609 07/03/21  0603 07/03/21  0603   WBC 28.6* 29.4*  32.7*  --  33.8*  --  35.2*   HGB 7.5* 7.4* 7.9*  --  7.8*   < > 6.7*    100 101*  --  100  --  103*    180 211  --  184  --  147*   INR  --   --  2.18*  --  2.11*  --  2.08*     --  148*  --  144  --  137   POTASSIUM 3.1*  --  3.1* 3.3* 2.8*  --  3.3*   CHLORIDE 107  --  116*  --  111*  --  104   CO2 23  --  18*  --  21  --  23   BUN 32*  --  52*  --  35*  --  23   CR 2.91*  --  4.74*  --  3.43*  --  2.66*   ANIONGAP 9  --  14  --  11  --  10   JANENE 9.1  --  9.0  --  8.6  --  8.4*   *  --  123*  --  121*  --  94   ALBUMIN 4.1  --  3.4  --  3.3*  --  2.9*   PROTTOTAL 8.8  --  7.8  --  7.1  --  6.9   BILITOTAL 24.4*  --  24.7*  --  24.1*  --  22.4*   ALKPHOS 150  --  168*  --  169*  --  151*   ALT 20  --  21  --  22  --  23   AST 97*  --  78*  --  84*  --  83*    < > = values in this interval not displayed.     No results found for this or any previous visit (from the past 24 hour(s)).

## 2021-07-06 NOTE — PLAN OF CARE
Addendum             Neuro: A&Ox1, inconsistently following commands. On stage 1 [place] protocol, only slightly improved symptoms following. Continuing w/ protocol.  Cardiac: ST on tele rates 110-120's. Temp max 99.2 Ax.  Respiratory: Sating 95-97% on RA. C/O SOB placed on 1 L per NC with sats at 98%.  GI/: Anuric, on hemodialysis via R CVC. Consistent diarrhea w/ lactulose doses. Inc stools X 2. Denies nausea.  Diet/appetite: Tolerating Dd3 diet, sipping fluids and eating ice chips.  Activity:  Up with lift. Changed mattress to pulsate for more cushion on lower back. Reposition q 2 hours PRN. Bed alarm on.  Pain: At acceptable level on current regimen.   Skin: No new deficits noted. See flowsheets.  LDA's: L triple internal jugular WDL, infusing ATB/TKO. R CVC HD line wdl. R bilary drain output maroon with good output. NG clamped and used for medications.     Plan: follow up Lactic acid level was 2.0  No new orders at this time. Will continue with POC and notify pt care team with any changes.            Revision

## 2021-07-07 LAB
ALBUMIN SERPL-MCNC: 3.5 G/DL (ref 3.4–5)
ALP SERPL-CCNC: 153 U/L (ref 40–150)
ALT SERPL W P-5'-P-CCNC: 24 U/L (ref 0–70)
ANION GAP SERPL CALCULATED.3IONS-SCNC: 14 MMOL/L (ref 3–14)
AST SERPL W P-5'-P-CCNC: 115 U/L (ref 0–45)
BACTERIA SPEC CULT: NO GROWTH
BACTERIA SPEC CULT: NORMAL
BILIRUB SERPL-MCNC: 23.5 MG/DL (ref 0.2–1.3)
BUN SERPL-MCNC: 53 MG/DL (ref 7–30)
CALCIUM SERPL-MCNC: 9.1 MG/DL (ref 8.5–10.1)
CHLORIDE SERPL-SCNC: 102 MMOL/L (ref 94–109)
CO2 SERPL-SCNC: 18 MMOL/L (ref 20–32)
CREAT SERPL-MCNC: 3.91 MG/DL (ref 0.66–1.25)
ERYTHROCYTE [DISTWIDTH] IN BLOOD BY AUTOMATED COUNT: 25.6 % (ref 10–15)
GFR SERPL CREATININE-BSD FRML MDRD: 19 ML/MIN/{1.73_M2}
GLUCOSE SERPL-MCNC: 89 MG/DL (ref 70–99)
HCT VFR BLD AUTO: 23.2 % (ref 40–53)
HGB BLD-MCNC: 7.3 G/DL (ref 13.3–17.7)
LACTATE BLD-SCNC: 0.9 MMOL/L (ref 0.7–2)
Lab: NORMAL
MCH RBC QN AUTO: 31.9 PG (ref 26.5–33)
MCHC RBC AUTO-ENTMCNC: 31.5 G/DL (ref 31.5–36.5)
MCV RBC AUTO: 101 FL (ref 78–100)
PHOSPHATE SERPL-MCNC: 3.9 MG/DL (ref 2.5–4.5)
PLATELET # BLD AUTO: 208 10E9/L (ref 150–450)
POTASSIUM SERPL-SCNC: 3.3 MMOL/L (ref 3.4–5.3)
PROT SERPL-MCNC: 7.8 G/DL (ref 6.8–8.8)
RBC # BLD AUTO: 2.29 10E12/L (ref 4.4–5.9)
SODIUM SERPL-SCNC: 134 MMOL/L (ref 133–144)
SPECIMEN SOURCE: NORMAL
SPECIMEN SOURCE: NORMAL
WBC # BLD AUTO: 24.6 10E9/L (ref 4–11)

## 2021-07-07 PROCEDURE — 85027 COMPLETE CBC AUTOMATED: CPT | Performed by: STUDENT IN AN ORGANIZED HEALTH CARE EDUCATION/TRAINING PROGRAM

## 2021-07-07 PROCEDURE — 84100 ASSAY OF PHOSPHORUS: CPT | Performed by: STUDENT IN AN ORGANIZED HEALTH CARE EDUCATION/TRAINING PROGRAM

## 2021-07-07 PROCEDURE — 250N000013 HC RX MED GY IP 250 OP 250 PS 637

## 2021-07-07 PROCEDURE — 250N000009 HC RX 250

## 2021-07-07 PROCEDURE — G0463 HOSPITAL OUTPT CLINIC VISIT: HCPCS

## 2021-07-07 PROCEDURE — 258N000003 HC RX IP 258 OP 636

## 2021-07-07 PROCEDURE — 36592 COLLECT BLOOD FROM PICC: CPT | Performed by: INTERNAL MEDICINE

## 2021-07-07 PROCEDURE — 99222 1ST HOSP IP/OBS MODERATE 55: CPT | Performed by: INTERNAL MEDICINE

## 2021-07-07 PROCEDURE — 99207 PR NON-BILLABLE SERV PER CHARTING: CPT | Performed by: INTERNAL MEDICINE

## 2021-07-07 PROCEDURE — 99233 SBSQ HOSP IP/OBS HIGH 50: CPT

## 2021-07-07 PROCEDURE — 250N000013 HC RX MED GY IP 250 OP 250 PS 637: Performed by: STUDENT IN AN ORGANIZED HEALTH CARE EDUCATION/TRAINING PROGRAM

## 2021-07-07 PROCEDURE — 250N000011 HC RX IP 250 OP 636: Performed by: NURSE PRACTITIONER

## 2021-07-07 PROCEDURE — 83605 ASSAY OF LACTIC ACID: CPT | Performed by: INTERNAL MEDICINE

## 2021-07-07 PROCEDURE — 99233 SBSQ HOSP IP/OBS HIGH 50: CPT | Mod: GC | Performed by: INTERNAL MEDICINE

## 2021-07-07 PROCEDURE — 250N000013 HC RX MED GY IP 250 OP 250 PS 637: Performed by: NURSE PRACTITIONER

## 2021-07-07 PROCEDURE — 250N000011 HC RX IP 250 OP 636: Performed by: INTERNAL MEDICINE

## 2021-07-07 PROCEDURE — 36592 COLLECT BLOOD FROM PICC: CPT | Performed by: STUDENT IN AN ORGANIZED HEALTH CARE EDUCATION/TRAINING PROGRAM

## 2021-07-07 PROCEDURE — 80053 COMPREHEN METABOLIC PANEL: CPT | Performed by: STUDENT IN AN ORGANIZED HEALTH CARE EDUCATION/TRAINING PROGRAM

## 2021-07-07 PROCEDURE — 250N000011 HC RX IP 250 OP 636: Performed by: STUDENT IN AN ORGANIZED HEALTH CARE EDUCATION/TRAINING PROGRAM

## 2021-07-07 PROCEDURE — 90937 HEMODIALYSIS REPEATED EVAL: CPT

## 2021-07-07 PROCEDURE — 120N000005 HC R&B MS OVERFLOW UMMC

## 2021-07-07 RX ORDER — LACTULOSE 10 G/15ML
20 SOLUTION ORAL 4 TIMES DAILY
Status: DISCONTINUED | OUTPATIENT
Start: 2021-07-07 | End: 2021-08-08

## 2021-07-07 RX ORDER — MIDODRINE HYDROCHLORIDE 5 MG/1
10 TABLET ORAL
Status: COMPLETED | OUTPATIENT
Start: 2021-07-07 | End: 2021-07-09

## 2021-07-07 RX ADMIN — POTASSIUM CHLORIDE 20 MEQ: 1.5 POWDER, FOR SOLUTION ORAL at 20:57

## 2021-07-07 RX ADMIN — LACTULOSE 20 G: 10 SOLUTION ORAL; RECTAL at 16:34

## 2021-07-07 RX ADMIN — MELATONIN TAB 3 MG 6 MG: 3 TAB at 21:48

## 2021-07-07 RX ADMIN — Medication 40 MG: at 13:43

## 2021-07-07 RX ADMIN — THIAMINE HCL TAB 100 MG 100 MG: 100 TAB at 13:43

## 2021-07-07 RX ADMIN — ONDANSETRON 4 MG: 2 INJECTION INTRAMUSCULAR; INTRAVENOUS at 14:02

## 2021-07-07 RX ADMIN — Medication 550 MG: at 21:48

## 2021-07-07 RX ADMIN — HYDROMORPHONE HYDROCHLORIDE 0.5 MG: 1 INJECTION, SOLUTION INTRAMUSCULAR; INTRAVENOUS; SUBCUTANEOUS at 10:05

## 2021-07-07 RX ADMIN — FOLIC ACID 1 MG: 1 TABLET ORAL at 13:42

## 2021-07-07 RX ADMIN — Medication 550 MG: at 13:43

## 2021-07-07 RX ADMIN — POTASSIUM CHLORIDE 20 MEQ: 1.5 POWDER, FOR SOLUTION ORAL at 13:43

## 2021-07-07 RX ADMIN — SODIUM CHLORIDE 300 ML: 9 INJECTION, SOLUTION INTRAVENOUS at 09:37

## 2021-07-07 RX ADMIN — LACTULOSE 20 G: 10 SOLUTION ORAL; RECTAL at 20:57

## 2021-07-07 RX ADMIN — SODIUM CHLORIDE 250 ML: 9 INJECTION, SOLUTION INTRAVENOUS at 09:37

## 2021-07-07 RX ADMIN — MEROPENEM 500 MG: 500 INJECTION, POWDER, FOR SOLUTION INTRAVENOUS at 21:03

## 2021-07-07 RX ADMIN — MIDODRINE HYDROCHLORIDE 10 MG: 5 TABLET ORAL at 07:49

## 2021-07-07 ASSESSMENT — ACTIVITIES OF DAILY LIVING (ADL)
ADLS_ACUITY_SCORE: 20
ADLS_ACUITY_SCORE: 22
ADLS_ACUITY_SCORE: 20

## 2021-07-07 ASSESSMENT — MIFFLIN-ST. JEOR: SCORE: 1582.75

## 2021-07-07 NOTE — PLAN OF CARE
"/62 (BP Location: Right arm)   Pulse 110   Temp 97.8  F (36.6  C) (Axillary)   Resp 20   Ht 1.676 m (5' 6\")   Wt 69.5 kg (153 lb 3.5 oz)   SpO2 97%   BMI 24.73 kg/m      Neuro: Lethargic at times arousing to voice, alert at other times. Oriented to self and aware he his in the hospital in [place], but disoriented to time -was off on date and year, said he had been left alone for hours after staff had cleaned and changed his brief 10 minutes earlier.  Able to make needs known. [facility] monitored and PRN Lactulose given x1 in addition to scheduled lactulose.   Cardiac: SR. VSS.   Respiratory: Sating >95 on RA. Lung sounds clear.  GI/: Unknown UOP, pt with liquid stool output into brief. Frequent incontinent BMs,  Diet/appetite: Tolerating DD3 diet, appetite poor.   Activity:  Assist of 2 for turns and repositioning. Repositioned q 1-2h.  Pain: pt c/o back pain, relieved by repositioning.   Skin: No new deficits noted.   LDA's: L TL PICC, R CVC. NG. L flank drain. Meds given through tube.         Plan: Continue with POC. Notify primary team with changes.    "

## 2021-07-07 NOTE — PROGRESS NOTES
Antimicrobial Stewardship Team Note    Antimicrobial Stewardship Program - A joint venture between [facility] and  Physicians to optimize antibiotic management.  NOT a formal consult - Restricted Antimicrobial Review     Patient: Dax Villareal  MRN: [id number]  Allergies: Patient has no known allergies.    Brief Summary: Dax Villareal is a 30 year old male with PMHx significant for alcohol use disorder who transferred from  OSH on 6/28 with for further management of acute necrotizing pancreatitis.     HPI: Patient was admitted at OSH from 5/18 - 6/28 for acute alcohol hepatitis/end stage liver disease complicated by hepatic encephalopathy, acute renal failure requiring hemodialysis, acute hypoxemic respiratory failure and septic shock secondary to acute necrotizing pancreatitis. It was noted that the patient had been actively drinking up until being admitted to [facility].  6/19 CT abdomen/pelvis from OSH showed possible necrotizing pancreatitis and repeat CT (6/25) showed peripancreatic fluid collection consistent with complications from pancreatitis, 9 cm soft tissue or hemorrhagic structure near pancreatic head concerning for possible internal hemorrhage of pseudocyst. Patient was treated with multiple antibiotics (courses outlined below).     On transfer, patient was requiring pressor therapy in the setting of severe leukocytosis (WBC 50.6), lactic acid (1.9), elevated procalcitonin (5.56), and was coagulopathic (INR 3.8). Patient's antimicrobials were broadened to meropenem, vancomycin, and micafungin was continued with septic shock due to necrotizing pancreatitis with pseudocyst. 6/28 CT a/p w/ contrast showed necrotizing pancreatitis w/ peripancreatic necrotic, multilobulated heterogenous fluid collection with enhancing wall (21.2q69a52.7 cm), large volume abdominal ascites, & mesenteric edema. Patient underwent retroperitoneal drain placement on 6/30. Peritoneal abscess cultures with  no growth so far. Patient underwent paracentesis x2 on 7/2 (WBCs 2663, 74% neutrophils) and 7/5 (WBCs 1801, 81% neutrophils). Blood cultures finalized as no growth. COVID screens negative.     7/4 CT a/p with substantially decreased but persistent hemorrhagic walled off necrosis centered in the lesser sac along with a smaller collection along the inferior aspect of the third portion of the duodenum minimally changed and similar moderate to large volume loculated ascites. Patient remains on meropenem today.     Previous antibiotic courses:     Cefepime 6/19-6/28    Metronidazole 6/12-6/28    Micafungin 6/24-7/1    Ciprolfoxacin 6/9-6/19    Vancomycin 6/29 x1 dose    Meropenem 6/29-present         Active Anti-infective Medications   (From admission, onward)                 Start     Stop    06/29/21 0230  meropenem  500 mg,   Intravenous,   EVERY 24 HOURS     Sepsis        --    06/29/21 0230  micafungin (MYCAMINE) injection  100 mg,   Intravenous,   100 mL/hr,   EVERY 24 HOURS     septic shock        --    06/29/21 0214  Vancomycin Place [facility] - Receiving Intermittent Dosing  1 each,   Intravenous,   SEE ADMIN INSTRUCTIONS     Sepsis        --    06/28/21 2300  rifaximin  550 mg,   Oral,   2 TIMES DAILY     Intestinal bacteria reduction in the treament of Hepatic Encephalopathy.        --                  Assessment: Presumed infected necrotizing pancreatitis w/ walled off necrotic collection s/p drain placement 6/30/2021   Today is day 8 of meropenem therapy. Patient's leukocytosis is persistent, though trending down overall (24.6 today). He has been hemodynamically stable, not requiring pressors since 6/29. Agree with 7/2 and 7/5 paracenteses being more reflective of pancreatic ascites secondary to pseudocyst given high ascitic protein counts in the setting of EtOH use/decompensated liver disease while having been on broad empiric therapy that would adequately cover SBP. Left retroperitoneal drain remains in place  with output appearing fairly consistent (~250-300 mL/day). Presently, there are no plans for additional drainage of collection with no great window per GI. Though patient was on empiric antibiotics at the time of fluid culture collection (possibly decreasing overall culture yield), cultures finalized without growth of ESBL-producing organisms. Without a significant microbiological history and anticipated prolonged antimicrobial course, favor transitioning from meropenem to Zosyn.     Recommendation/Interventions   Transition from meropenem to piperacillin-tazobactam 2.25 grams IV every 6 hours (renally dose adjusted)      Discussed with ID Staff MD Uzma Liu, PharmD, BCIDP  Pager: [phone]    Vital Signs/Clinical Features:  Vitals         07/05 0700  -  07/06 0659 07/06 0700  -  07/07 0659 07/07 0700  -  07/07 1301   Most Recent    Temp ( F) 97.7 -  99.2    97.3 -  98.8    98 -  98.5     98 (36.7)    Pulse 111 -  130    107 -  118    110 -  144     112    Resp 13 -  35    18 -  20    12 -  24     14    BP 86/43 -  120/104    91/39 -  105/63    95/60 -  107/65     100/58    SpO2 (%) 91 -  99    97 -  99    91 -  98     93            Labs  Estimated Creatinine Clearance: 26.5 mL/min (A) (based on SCr of 3.91 mg/dL (H)).  Recent Labs   Lab Test 07/02/21  0505 07/03/21  0603 07/04/21  0609 07/05/21  0625 07/06/21  0515 07/07/21  0623   CR 3.28* 2.66* 3.43* 4.74* 2.91* 3.91*       Recent Labs   Lab Test 10/06/20  0927 12/09/20  1504 05/18/21  1220 06/28/21  1839 06/28/21  1839 07/03/21  0603 07/03/21  2114 07/04/21  0609 07/05/21  0625 07/05/21  1809 07/06/21  0515 07/07/21  0623   WBC 6.9 4.2 13.2* 50.6*   < > 35.2*  --  33.8* 32.7* 29.4* 28.6* 24.6*   ANEU 5.2 2.1 12.0* 44.0*  --   --   --   --   --   --   --   --    ALYM 0.8 1.3 0.6* 0.0*  --   --   --   --   --   --   --   --    MEDHAT 0.8 0.6 0.4 2.0*  --   --   --   --   --   --   --   --    AEOS 0.1 0.2 0.0 0.5  --   --   --   --   --   --    --   --    HGB 18.8* 17.2 16.6 7.6*   < > 6.7* 7.6* 7.8* 7.9* 7.4* 7.5* 7.3*   HCT 51.2 47.7 44.8 23.4*   < > 21.1*  --  24.0* 25.0* 23.2* 23.3* 23.2*   MCV 89 93 98 101*   < > 103*  --  100 101* 100 100 101*    299 222 127*   < > 147*  --  184 211 180 201 208    < > = values in this interval not displayed.       Recent Labs   Lab Test 07/02/21  0505 07/03/21  0603 07/04/21  0609 07/05/21  0625 07/06/21  0515 07/07/21  0623   BILITOTAL 21.0* 22.4* 24.1* 24.7* 24.4* 23.5*   ALKPHOS 144 151* 169* 168* 150 153*   ALBUMIN 2.4* 2.9* 3.3* 3.4 4.1 3.5   AST 80* 83* 84* 78* 97* 115*   ALT 26 23 22 21 20 24       Recent Labs   Lab Test 10/06/20  0903 10/06/20  0927 12/09/20  1504 12/09/20  1837 05/18/21  1220 06/28/21  1839 07/05/21 2033   PCAL  --   --   --   --  1.65 5.56*  --    LACT 1.5  --  2.4* 1.9 7.7* 1.9 2.0   CRP  --  16.9*  --   --   --   --   --        Recent Labs   Lab Test 06/30/21  0700   VANCOMYCIN 31.2*       Culture Results:  7-Day Micro Results       Procedure Component Value Units Date/Time    Fungus Culture, non-blood [[id number]] Collected: 07/05/21 1858    Order Status: Canceled Lab Status: No result     Fungus culture [[id number]] Collected: 07/05/21 1815    Order Status: Canceled Lab Status: No result     Specimen: Ascites Fluid     Cell count with differential fluid [[id number]] Collected: 07/05/21 1605    Order Status: Completed Lab Status: Edited Result - FINAL Updated: 07/05/21 1931    Specimen: Ascites      Body Fluid Analysis Source Ascites     Color Fluid Orange     Appearance Fluid Cloudy     WBC Fluid 1801 /uL      Comment: No reference ranges have been established.  This result should be interpreted   in the context of the patient's clinical condition and compared to   simultaneous measurement in the patient's blood.  Refer to Lab Guide for   specific interpretive guidelines.          % Neutrophils Fluid 61 %      % Lymphocytes Fluid 32 %      % Mono/Macro Fluid 6 %      % Basophils Fluid 1 %      Albumin fluid [[id number]] Collected: 07/05/21 1605    Order Status: Completed Lab Status: Final result Updated: 07/05/21 1722    Specimen: Ascites      Albumin Fluid Source Ascites     Albumin Fluid 2.6 g/dL      Comment: No reference ranges have been established.  This result should be interpreted   in the context of the patient's clinical condition and compared to   simultaneous measurement in the patient's blood.  Refer to Lab Guide for   specific interpretive guidelines.         Protein fluid [[id number]] Collected: 07/05/21 1605    Order Status: Completed Lab Status: Final result Updated: 07/05/21 1727    Specimen: Ascites      Protein Total Fluid Source Ascites     Protein Total Fluid 5.2 g/dL      Comment: No reference ranges have been established.  This result should be interpreted   in the context of the patient's clinical condition and compared to   simultaneous measurement in the patient's blood.  Refer to Lab Guide for   specific interpretive guidelines.         Gram stain [[id number]] Collected: 07/05/21 1605    Order Status: Completed Lab Status: Final result Updated: 07/05/21 1850    Specimen: Ascites      Specimen Description Ascites     Gram Stain No organisms seen      Moderate  WBC'S seen      fluid culture - Aerobic Bacterial [[id number]] Collected: 07/05/21 1605    Order Status: Completed Lab Status: Preliminary result Updated: 07/06/21 1122    Specimen: Ascites      Specimen Description Ascites     Culture Micro Culture negative monitoring continues    Lipase fluid [[id number]] Collected: 07/05/21 1605    Order Status: Completed Lab Status: Final result Updated: 07/05/21 1814    Specimen: Ascites      Lipase Fluid Source Ascites     Lipase Fluid 48 U/L      Comment: No reference ranges have been established.  This result should be interpreted   in the context of the patient's clinical condition and compared to   simultaneous measurement in the patient's blood.  Refer to Lab Guide for   specific interpretive  guidelines.         Fungus Culture, non-blood [[id number]] Collected: 07/05/21 1605    Order Status: Completed Lab Status: Preliminary result Updated: 07/06/21 1507    Specimen: Ascites      Specimen Description Ascites     Culture Micro Culture negative after 19 hours    Cell count with differential fluid [[id number]] Collected: 07/02/21 1250    Order Status: Completed Lab Status: Final result Updated: 07/02/21 1456    Specimen: Ascites      Body Fluid Analysis Source Ascites     % Neutrophils Fluid 74 %      % Lymphocytes Fluid 19 %      % Mono/Macro Fluid 7 %      Color Fluid Pink     Appearance Fluid Cloudy     WBC Fluid 2663 /uL      Comment: No reference ranges have been established.  This result should be interpreted   in the context of the patient's clinical condition and compared to   simultaneous measurement in the patient's blood.  Refer to Lab Guide for   specific interpretive guidelines.         Albumin fluid [[id number]] Collected: 07/02/21 1250    Order Status: Completed Lab Status: Final result Updated: 07/02/21 1327    Specimen: Ascites      Albumin Fluid Source Ascites     Albumin Fluid 2.0 g/dL      Comment: No reference ranges have been established.  This result should be interpreted   in the context of the patient's clinical condition and compared to   simultaneous measurement in the patient's blood.  Refer to Lab Guide for   specific interpretive guidelines.         Protein fluid [[id number]] Collected: 07/02/21 1250    Order Status: Completed Lab Status: Final result Updated: 07/02/21 1332    Specimen: Ascites      Protein Total Fluid Source Ascites     Protein Total Fluid 4.1 g/dL      Comment: No reference ranges have been established.  This result should be interpreted   in the context of the patient's clinical condition and compared to   simultaneous measurement in the patient's blood.  Refer to Lab Guide for   specific interpretive guidelines.         Gram stain [[id number]] Collected: 07/02/21 1250    Order  Status: Completed Lab Status: Final result Updated: 07/02/21 1505    Specimen: Ascites      Specimen Description Ascites     Gram Stain No organisms seen      Moderate  WBC'S seen  predominantly PMN's      fluid culture - Aerobic Bacterial [[id number]] Collected: 07/02/21 1250    Order Status: Completed Lab Status: Final result Updated: 07/07/21 0706    Specimen: Ascites      Specimen Description Ascites     Culture Micro No growth    Blood culture [[id number]] Collected: 07/02/21 0111    Order Status: Completed Lab Status: Preliminary result Updated: 07/07/21 0416    Specimen: Blood      Specimen Description Blood Right Hand     Culture Micro No growth after 5 days    Blood culture [[id number]] Collected: 07/02/21 0105    Order Status: Completed Lab Status: Preliminary result Updated: 07/07/21 0416    Specimen: Blood      Specimen Description Blood PICC     Culture Micro No growth after 5 days            Recent Labs   Lab Test 10/06/20  1214 12/09/20  1817   URINEPH 7.0 7.0   NITRITE Negative Negative   LEUKEST Negative Negative   WBCU 1 0                         Imaging: Xr Chest Port 1 View    Result Date: 7/1/2021  Chest radiograph  6/30/2021 11:53 PM  HISTORY: Evaluation of transient hypoxemia COMPARISON: 6/30/2021 FINDINGS: Single AP view of the chest. Right IJ central line tip overlying the superior cavoatrial junction. Left upper extremity PICC tip overlying the low SVC. Trachea is midline. Cardiac silhouette is similar in size. No pneumothorax. Increased small left and trace right pleural effusions. Increased perihilar and bibasilar interstitial and airspace opacities, right greater than left.     IMPRESSION: 1. Stable support devices. 2. Cardiomegaly with increased perihilar and bibasilar interstitial and airspace opacities, right greater than left. 3. Increased small left and trace right pleural effusions. I have personally reviewed the examination and initial interpretation and I agree with the findings. CHELLE ROMAN  MD RAUDEL    SYSTEM ID:  [id number]    Xr Abdomen Port 1 View    Result Date: 7/4/2021  Exam: XR ABDOMEN PORT 1 VIEWS, 7/4/2021 11:33 AM Indication: NGT placement Comparison: Same day abdominal radiograph performed proximally 4 hours earlier Findings: Portable supine view of the abdomen is obtained. Enteric tube distal tip and sidehole project over the stomach. Redemonstration of mildly dilated loops of small bowel with one loop over the lower right abdomen now measuring up to 4 cm. Paucity of bowel gas throughout the remainder of the abdomen. No definite pneumatosis. Abdominal drain projects over the left hemiabdomen. No gross osseous abnormality.     Impression: 1. Enteric tube tip and sidehole project over the stomach. 2. Mildly increased prominence of nonspecific gas-filled loops of small bowel in the right lower abdomen. I have personally reviewed the examination and initial interpretation and I agree with the findings. SKYLAR KAPADIA DO    SYSTEM ID:  [id number]    Xr Abdomen Port 1 View    Result Date: 7/4/2021  Exam: XR ABDOMEN PORT 1 VIEWS, 7/4/2021 8:36 AM Indication: R/o Obstruction Comparison: CT dated 6/29/2021. Abdominal radiograph dated 6/30/2021. Findings: Supine view of the abdomen is obtained. Abdominal drain projects over the left hemiabdomen. A few, air-filled nondilated loops of small bowel measuring up to 2.7 cm over the mid low abdomen is similar to prior radiograph and CT. Paucity of bowel gas throughout the large bowel and upper abdomen similar to prior and corresponding to ascites and peripancreatic fluid collections seen on comparison CT. No definite pneumatosis or portal venous gas. No gross osseous abnormality.     Impression: Nonobstructive bowel gas pattern. I have personally reviewed the examination and initial interpretation and I agree with the findings. SKYLAR KAPADIA DO    SYSTEM ID:  [id number]    Ct Abdomen Pelvis W/o Contrast    Result Date: 7/4/2021  EXAMINATION: CT ABDOMEN  PELVIS W/O CONTRAST, 7/4/2021 1:16 PM TECHNIQUE:  Helical CT images from the lung bases through the pubic symphysis were obtained without IV contrast. COMPARISON: 6/29/2021 HISTORY: Abdominal abscess/infection suspected; Monitoring CT for patient with ESLD, SBP, and Nec Panc s/p perc drain. GI wants CT to see the status of necrotizing collection. Has ARF on HD thus want to avoid contrast. Thanks! FINDINGS: Abdomen and pelvis: Status post placement of a left lateral approach drainage catheter, decreased encapsulated multifaceted pancreatic/peripancreatic collection centered at the lesser sac and extending into the central mesentery and left retroperitoneum, containing low attenuation fluid, nonliquefied fat, and hyperattenuating blood products. Minimal change in a separate intermediate attenuation collection located along the inferior aspect of the third portion of the duodenum. Evaluation of the pancreatic parenchyma is limited by lack of intravenous contrast. Minimally changed loculated ascites in the left upper quadrant, left paracolic gutter, perihepatic space, right paracolic gutter, and pelvis with diffuse peritoneal thickening. Hyperattenuating septations are visible in the pelvic ascites. Mesenteric and omental edema, similar to prior. Diffusely hypoattenuating hepatic parenchyma. The liver is enlarged, measuring 22.2 cm in cranial caudal dimension with mild capsular nodularity. Normal gallbladder. No intra or extrahepatic biliary dilation. The spleen is enlarged, measuring 15.4 cm in craniocaudal dimension. Normal adrenal glands. No appreciable focal renal cortical lesion. No hydronephrosis, hydroureter, or urinary tract stone. Decompressed bladder. No dilated small or large bowel. Diffuse edematous wall thickening of the fluid-filled small and large bowel with a few scattered air-fluid levels. No pneumatosis, portal venous gas, or free air. Enteric tube tip and sidehole in the stomach. Stable prominent  jan hepatis, mesenteric, and retroperitoneal lymph nodes. No abdominal aortic aneurysm. Recanalized umbilical vein. Lung bases:  Small right and trace left pleural effusions with adjacent compressive atelectasis. Patchy groundglass opacities in the right middle lobe and partially visualized right upper lobe. Small pericardial effusion. Bones and soft tissues: No acute or aggressive osseus abnormality.     IMPRESSION: 1. Status post placement of a left lateral approach drainage catheter, substantially decreased but persistent hemorrhagic walled off necrosis centered in the lesser sac. A smaller collection along the inferior aspect of the third portion of the duodenum is minimally changed. 2. Minimally changed moderate to large volume loculated ascites. 3. Hepatomegaly and hepatic steatosis with evidence of cirrhosis and portal hypertension. 4. Diffuse edematous wall thickening of the small and large bowel, likely reactive. No bowel obstruction. 5. Small right and trace left pleural effusions with adjacent compressive atelectasis, similar to prior. Small pericardial effusion. 6. Faint patchy ground glass opacities in the right middle lobe and and right upper lobe, likely infectious/inflammatory. SKYLAR KAPADIA DO    SYSTEM ID:  [id number]    Poc Us Guide For Paracentesis    Limited abdominal ultrasound was performed and demonstrated an adequate fluid collection on the left side of the abdomen. Doppler of the skin demonstrated an area at this site without significant vasculature. A paracentesis at this site was subsequently performed. Gaurav Russell MD     Poc Us Guide For Paracentesis    Limited abdominal ultrasound was performed and demonstrated an adequate fluid collection on the left side of the abdomen. Doppler of the skin demonstrated an area at this site without significant vasculature. A paracentesis at this site was subsequently performed. Gaurav Russell MD

## 2021-07-07 NOTE — PROGRESS NOTES
"/53   Pulse 135   Temp 98  F (36.7  C) (Oral)   Resp 27   Ht 1.676 m (5' 6\")   Wt 68.5 kg (151 lb 0.2 oz)   SpO2 92%   BMI 24.37 kg/m    Pt arrived to  from dialysis around 1330. All vitals stable, pt awake and oriented. All AM meds given. His sister is visiting with their aunt. She has requested to speak with the primary team. MD stating he will be by within the hour or so. Nursing will continue to monitor pt status and update with concerns.    "

## 2021-07-07 NOTE — PROGRESS NOTES
Care Management Follow Up    Length of Stay (days): 9    Expected Discharge Date: TBD     I have been asked to arrange a care conference.  July 9th @ 1pm  Nephrology Bernadette DONOVAN  Pager [phone]  GI Hepatology Brianna DONOVAN  Pager [phone]  Palliative Care Dr Anderson  Pager [phone]  M3  Dr Flanagan  Pager [phone]    I met with Pts Sister, Noni to inform her of care conference, which she is planning to attend. I have given Noni the bedside IPAD information for other Family members to participate.          Junie Morris RN   6B care coordinator #[phone]

## 2021-07-07 NOTE — PROGRESS NOTES
HEMODIALYSIS TREATMENT NOTE    Date: 7/7/2021  Time: 1:36 PM    Data:  Pre Wt: 68.5 kg (151 lb 0.2 oz)   Desired Wt: 68.5 kg   Post Wt: 68.5 kg (151 lb 0.2 oz)  Weight change: 0 kg  Ultrafiltration - Post Run Net Total Removed (mL): 0 mL  Vascular Access Status: patent  Dialyzer Rinse: Clear  Total Blood Volume Processed: 69.44 L Liters  Total Dialysis (Treatment) Time: 3.5 Hours    Lab:   no    Interventions/Assessment:  HD initiated via cvc, 400bfr/600dfr, 3.5hour run, ufg 0. Patient tolerated tx well, received dilaudid for lower back pain. Patient completed run successfully. Report given to PCN.      Plan:    As per renal

## 2021-07-07 NOTE — PROGRESS NOTES
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  -Apply THIN layer of Critic-Aid Thick Moisture Barrier Paste to all skin that is in contact with stool or urine.  -With repeat cleansings, DO NOT scrub Critic-Aid down to skin, just gently remove surface incontinence soil and reapply additional Critic Aid, if needed.  -If complete removal of Critic-Aid is required, use a warm wash cloth and Samson cleanser: place the warm wash cloth to the area for a minute and then cleanse, or use mineral oil/baby oil and soft disposable wash cloth.  .    Avoid brief or pull-up in bed, use only bed underpad.      Orders Reviewed  WO Nurse follow-up plan:weekly  Nursing to notify the Provider(s) and re-consult the [facility] Nurse if wound(s) deteriorates or new skin concern.    Patient History  According to provider note(s):  30 year old male with PMHx significant for alcohol use disorder who has had a prolonged hospitalization at [facility] in [place] for acute alcohol hepatitis/end stage liver disease complicated by hepatic encephalopathy, acute renal failure requiring hemodialysis, acute hypoxemic respiratory failure and septic shock secondary to acute necrotizing pancreatitis. Transferred to [facility] ICU on 2021 for further management of necrotizing pancreatitis by interventional GI, possible necrosectomy.    Objective Data  Containment of urine/stool: Incontinence Protocol and Incontinent pad in bed    Active Diet Order  Orders Placed This Encounter      Dysphagia Diet Level 3 Advanced Thin Liquids (water, ice chips, juice, milk gelatin, ice cream, etc)      Output:   I/O last 3 completed  shifts:  In: 340 [P.O.:120; I.V.:20; Other:20; NG/GT:180]  Out: 265 [Drains:265]    Risk Assessment:   Sensory Perception: 2-->very limited  Moisture: 2-->very moist  Activity: 2-->chairfast  Mobility: 2-->very limited  Nutrition: 2-->probably inadequate  Friction and Shear: 2-->potential problem  Tyrel Score: 12                          Labs:   Recent Labs   Lab 21  0623 21  0625 21  0625   ALBUMIN 3.5   < > 3.4   HGB 7.3*   < > 7.9*   INR  --   --  2.18*   WBC 24.6*   < > 32.7*    < > = values in this interval not displayed.       Physical Exam  Areas of skin assessed: focused buttocks     Wound Location:  Base of dennis cleft, inferior to coccyx        Date of last photo 21  Wound History: present on admission.  Pt has been receiving lactulose, previously with rectal tube with balloon.      Wound Base: 100 % dermis     Palpation of the wound bed: normal      Drainage: none     Description of drainage: serous     Measurements (length x width x depth, in cm) 0.4  x 0.2  x  0.2 cm   Periwound skin: erythema- blanchable      Temperature: normal   Odor: none  Pain: no grimacing or signs of discomfort,     Interventions  Visual inspection and assessment completed   Wound Care Rationale Provide protection   Wound Care: done per plan of care  Supplies: gathered  Current off-loading measures: Pillows  Current support surface: Standard  Atmos Air mattress  Discussed plan of care with Nurse

## 2021-07-07 NOTE — PROGRESS NOTES
Resident/Fellow Attestation   I, Eris Miles, was present with the medical/NAHUM student who participated in the service and in the documentation of the note.  I have verified the history and personally performed the physical exam and medical decision making.  I agree with the assessment and plan of care as documented in the note.      - My changes are documented within the note (in italics).    Eris Miles MD  PGY2  Date of Service (when I saw the patient): 07/07/21    [facility]    Progress Note - Maroon 3 Service        Date of Admission:  6/28/2021    Assessment & Plan   Dax Villareal is a 30 year old male with hx of alcohol use disorder admitted on 6/28/2021 after recent prolonged admission at [facility] for acute alcoholic hepatitis who has ESLD c/b HE, ARF requiring HD, and SBP in the setting of acute necrotizing pancreatitis. He arrived with acute respiratory failure and septic shock that have resolved, but he requires continued management for ARF, hepatic encephalopathy, SBP and nutritional support.      Changes today:   - Lactulose changed to 20mg QID via NG tube, monitor stool output   - Patient has rectal pouch. Monitor stool color and any additional blood clots in stool  - Patient declined enteral nutrition and NJT placement yesterday. Calorie counts started today  - Plan for family care conference on Friday at 1 pm   - Order placed for palliative care consult, who plan to talk with sister Noni tomorrow    Reviewed antimicrobial stewardship note, based on his tenuous clinical status will plan to keep patient on IV Meropenem at this time. Additionally, in patient's with necrotizing pancreatitis IV Meropenem is preferred versus Zosyn.      #Necrotizing pancreatitis w/ pseudocyst, improving  #SBP  #Profound leukocytosis, improving  Etiology of the septic shock that he came in with likely due to necrotizing pancreatitis +/- SBP. Had  percutaneous drain placed for drainage of pancreatic collection. Diagnostic para returned with SBP, likely 2/2 pancreatitis (SAAG 0.4) but clouded by underlying liver disease. Repeat para completed 7/5. Per GI, no plan for intervention at this time after reviewing 7/4 CT.     - Continue meropenem 500 mg q24h  - s/p Albumin x3 days   - Follow up cultures   - Daily CBC  - MAP goal > 65   - GI consulted, appreciate recs   - Tylenol 325mg, liquid BID PRN for lower back pain  - IV Dilaudid 0.5mg q4h PRN       # Decompensated EtOH cirrhosis c/b hepatic encephalopathy and recurrent ascites  # Hx of Alcohol use disorder c/b withdrawal   # Anemia  # Coagulopathy   Patient with a history of marked alcohol use and alcoholic hepatitis in the past. Was given a course of steroids (1 week) during previous admission at OSH. Patient mental status waxes and wanes but is stable, able to communicate needs. Asterixis is bit more pronounced than yesterday, plan to increase frequency of lactulose from TID to QID.     - Lactulose 20g QID and PRN and rifaximin 550mg BID (titrate to 1.5L stool/day)  - Trend MELD labs  - Daily folic acid and thiamine  - Delirium precautions  - Melatonin 6mg at bedtime  - Avoid benzos and opioids as able  - s/p 1 unit(s) pRBC 7/3  - Monitor hemoglobin  - Transfuse if hemoglobin < 7, platelet < 10 or < 30 with bleeding  - Continue to monitor for signs of bleeding     MELD-Na score: 40 at 7/7/2021  6:23 AM  MELD score: 40 at 7/7/2021  6:23 AM  Calculated from:  Serum Creatinine: 3.91 mg/dL at 7/7/2021  6:23 AM  Serum Sodium: 134 mmol/L at 7/7/2021  6:23 AM  Total Bilirubin: 23.5 mg/dL at 7/7/2021  6:23 AM  INR(ratio): 2.18 at 7/5/2021  6:25 AM  Age: 30 years 8 months     # ARF on HD (MWF)  # Hypervolemia causing acute respiratory failure, improving  - Nephrology consulted, recs appreciated     > Hemodialysis MWF              - Midodrine 10 mg PRN before dialysis sessions     > Daily chemistry labs, strict I/O,  and daily weights  - Aspiration precautions  - Supplemental oxygen as needed to keep sats above 89%     # Severe malnutrition in setting of chronic illness  - Dysphagia level 3 diet with thin liquids  - Pt refusing NJT and enteral feeding; calorie counts initiated 7/7, if not meeting calorie counts will need to further discuss NJ vs PEG-J for enteral nutrition; ideally would have goals of care conversation first. GI team will be involved to discuss nutrition.  - Continue multivitamins  - Pantoprazole 40 mg IV daily  - Zofran PRN       Diet: Snacks/Supplements Adult: Ensure Clear; With Meals  Dysphagia Diet Level 3 Advanced Thin Liquids (water, ice chips, juice, milk gelatin, ice cream, etc)  Calorie Counts    DVT Prophylaxis: Pneumatic Compression Devices  Rdz Catheter: Not present  Fluids: 5 mL NS TKO  Central Lines: PRESENT  PICC Triple Lumen 06/28/21 Left-Site Assessment: WDL  CVC Double Lumen Right-Site Assessment: WDL  Code Status: Full Code      Disposition Plan   Expected discharge: > 7 days, recommended to TCU once mental status at baseline, renal function and nutrition improved and necrotizing pancreatitis is well managed.     The patient's care was discussed with the Attending Physician, Dr. Flanagan.    Jaime Amin, MS3  Medical Student  [address]  Securely message with the [employer] Web Console (learn more here)  Text page via [employer] Paging/Directory  Please see sign in/sign out for up to date coverage information  ______________________________________________________________    Interval History   Patient was alert upon entering his room, able to communicate well and mental status appears stable, if not slightly improved, from yesterday. Sense of time is still a bit off, but he is otherwise oriented to place, self and year. PO intake has been poor, but patient says he has an appetite during exam. Back pain has improved and epigastric area is  .     Data reviewed today: I reviewed all medications, new labs and imaging results over the last 24 hours.    Physical Exam   Vital Signs: Temp: 98.1  F (36.7  C) Temp src: Oral BP: 103/57 Pulse: 116   Resp: 20 SpO2: 97 % O2 Device: None (Room air)    Weight: 151 lbs .24 oz    Gen: Awake, lying down in bed, talking but appears uncomfortable  HEENT: Conjunctival icterus  CV: Tachycardic. Regular rhythm.   Resp: Breathing comfortably on RA  Abd: Soft, distended, tender over epigastric area.   Skin: Jaundiced  Back: Continued lower back pain due to left flank RP drain with maroon/brown OP, no tenderness around tube insertion site  Ext: Well perfused, no edema  Neuro: Asterixis present, slightly worsened from yesterday. Oriented to self, location and year. Able to communicate needs well.   Data   Recent Labs   Lab 07/07/21  0623 07/06/21  0515 07/05/21  1809 07/05/21  0625 07/04/21  0609 07/04/21  0609 07/03/21  0603 07/03/21  0603   WBC 24.6* 28.6* 29.4* 32.7*  --  33.8*  --  35.2*   HGB 7.3* 7.5* 7.4* 7.9*  --  7.8*   < > 6.7*   * 100 100 101*  --  100  --  103*    201 180 211  --  184  --  147*   INR  --   --   --  2.18*  --  2.11*  --  2.08*    139  --  148*  --  144  --  137   POTASSIUM 3.3* 3.1*  --  3.1*   < > 2.8*  --  3.3*   CHLORIDE 102 107  --  116*  --  111*  --  104   CO2 18* 23  --  18*  --  21  --  23   BUN 53* 32*  --  52*  --  35*  --  23   CR 3.91* 2.91*  --  4.74*  --  3.43*  --  2.66*   ANIONGAP 14 9  --  14  --  11  --  10   JANENE 9.1 9.1  --  9.0  --  8.6  --  8.4*   GLC 89 132*  --  123*  --  121*  --  94   ALBUMIN 3.5 4.1  --  3.4  --  3.3*  --  2.9*   PROTTOTAL 7.8 8.8  --  7.8  --  7.1  --  6.9   BILITOTAL 23.5* 24.4*  --  24.7*  --  24.1*  --  22.4*   ALKPHOS 153* 150  --  168*  --  169*  --  151*   ALT 24 20  --  21  --  22  --  23   * 97*  --  78*  --  84*  --  83*    < > = values in this interval not displayed.     No results found for this or any  previous visit (from the past 24 hour(s)).

## 2021-07-07 NOTE — PROGRESS NOTES
Nephrology Progress Note  07/07/2021     Dax Villareal is a 30 year old male with h/o ETOH hepatitis, end stage liver disease, RADHA on HD, transferred from [facility] in [place] for septic shock on 6/28/21 for treatment of necrotizing pancreatitis and decompensated liver disease. He was initially admitted to [facility] 5/19/21.       Assessment & Recommendations:     1 RADHA - Remains dialysis dependent/anuric.   Last HD prior to hospital admission was 6/26/21. He has not had any OP HD at this time. Has been receiving inpatient HD at [facility] prior to transfer to [facility] on 6/28/21   - Etiology of RADHA felt to be JORGE L, abx, pancreatitis, sepsis, HRS   - Has Right TDC   - Will continue MWF schedule   - CRRT/HD consent obtained from sister Noni per phone   - TB is 23.5 which would also contribute to bilirubin toxicity   - Weekend staff discussed poor prognosis with patient's brother over the weekend given that patient is not a liver transplant candidate.    - Goals of care conversation with family, ie sister George recommended   - Monitor for any recovery with daily chemistry labs/strict I/O and daily weights     2. Volume status - Hypervolemia with hypoxia/ascites, on supplemental oxygen. Has bilateral small pleural effusions at lung bases on CT 7/4..   Weight will be unreliable given large ascites. SBP ~ 100/ Intake 790 ml/Output: UO none, NG ?, Drain 285 ml.    - Really does not require any fluid remove on HD at this time given output. Unfortunately we are not able to correct pleural effusions   - Strict I/O please     3. CV - Shock 2/2 Sepsis: Echo on 6/10/21 showed EF of > 70% w/WMA. Off pressor support. SBP ~ 100/   - Added Midodrine 10 mg prior to HD     4. ETOH hepatitis/ recurrent ascites, HE - Patient with heavy ETOH use. Last ETOH unknown. Currently on Lactulose/Rifax, thiamine, Folate. T bili 23.5   - Per GI     5. Electrolytes - Hypokalemia with stool output. K 3.3, Na 134.    - Started on KCL 20 meq bid by primary  "team.    - Ran on K 4 today     6.  Acid base - No acute concerns. Bicarb 18 pre run   - Will correct on HD     7. BMD - Ca 9.1, Phos 3.9, albumin 3.5    8. Antimicrobials - Meropenem. Off Vanco. BC NTD.   WBC 24.6, Afebrile    Recommendations were communicated to primary team via progress note    Seen and discussed with Dr. Leigha Toledo, NP   [phone]    Interval History :   Nursing and provider notes from last 24 hours reviewed.  Seen on HD  Tolerated w/o complication    Review of Systems:   I reviewed the following systems:  Resting    Physical Exam:   I/O last 3 completed shifts:  In: 800 [P.O.:480; I.V.:20; Other:30; NG/GT:270]  Out: 290 [Drains:290]   /62 (BP Location: Right arm)   Pulse 115   Temp 99  F (37.2  C) (Oral)   Resp 26   Ht 1.676 m (5' 6\")   Wt 68.5 kg (151 lb 0.2 oz)   SpO2 100%   BMI 24.37 kg/m       GENERAL APPEARANCE: Comfortable on HD  EYES:  scleral icterus, pupils equal  HEENT: NGT present  Pulmonary: lungs clear to auscultation. On supplemental oxygen   CV: tachy   - Edema - no LE edema  GI: large distended, tender. Has retroperitoneal drain  MS: no evidence of inflammation in joints, no muscle tenderness  SKIN: no rash, warm, dry, no cyanosis  NEURO: resting  ACCESS: Right TDC     Labs:   All labs reviewed by me  Electrolytes/Renal -   Recent Labs   Lab Test 07/07/21  0623 07/06/21  0515 07/05/21  0625 07/04/21  0609 07/04/21  0609 06/30/21  0445 06/30/21  0445    139 148*  --  144   < > 138   POTASSIUM 3.3* 3.1* 3.1*   < > 2.8*   < > 3.1*   CHLORIDE 102 107 116*  --  111*   < > 104   CO2 18* 23 18*  --  21   < > 22   BUN 53* 32* 52*  --  35*   < > 34*   CR 3.91* 2.91* 4.74*  --  3.43*   < > 3.02*   GLC 89 132* 123*  --  121*   < > 130*   JANENE 9.1 9.1 9.0  --  8.6   < > 8.4*   MAG  --   --  2.3  --  2.1  --  2.4*   PHOS 3.9  --  4.9*  --   --   --  5.3*    < > = values in this interval not displayed.       CBC -   Recent Labs   Lab Test 07/07/21  0623 " 07/06/21  0515 07/05/21  1809   WBC 24.6* 28.6* 29.4*   HGB 7.3* 7.5* 7.4*    201 180       LFTs -   Recent Labs   Lab Test 07/07/21  0623 07/06/21  0515 07/05/21  0625   ALKPHOS 153* 150 168*   BILITOTAL 23.5* 24.4* 24.7*   ALT 24 20 21   * 97* 78*   PROTTOTAL 7.8 8.8 7.8   ALBUMIN 3.5 4.1 3.4       Iron Panel - No lab results found.      Imaging:      Current Medications:    folic acid  1 mg Oral Daily    Or     folic acid  1 mg Intravenous Daily     lactulose  20 g Oral or NG Tube 4x Daily     melatonin  6 mg Oral At Bedtime     meropenem  500 mg Intravenous Q24H     midodrine  10 mg Oral Once in dialysis     pantoprazole  40 mg Oral QAM AC     potassium chloride  20 mEq Oral BID     rifaximin  550 mg Oral BID     sodium chloride (PF)  10 mL Irrigation Q8H     sodium chloride (PF)  3 mL Intracatheter Q8H     vitamin B1  100 mg Oral Daily    Or     thiamine  100 mg Intravenous Daily       - MEDICATION INSTRUCTIONS -       Tory Toledo, NP

## 2021-07-07 NOTE — CONSULTS
"[facility]  Palliative Care Consultation Note    Patient: Dax Villareal  Date of Admission:  6/28/2021    Requesting Clinician / Team: Daniela Logan  Reason for consult: Goals of care  Decisional support  Patient and family support    Recommendations:  Met bedside with Dax, my visit with him was abbreviated as his aunt arrived for a visit and Dax was really looking forward to seeing her and wanted to have time to talk with her. Met with Noni, Dax's sister--with Dax's permission--my visit with her was also abbreviated as Noni was trying to coordinate some things with her employer and she asked if we could set up a phone visit tomorrow at 11am.     In my brief visit with Dax, I worry he has only partial capacity for complex decision-making and may need help from family with complex decision-making, but I was not able to comprehensively assess this as our visit was cut short. His decisional capacity will need to be continually reassessed as it may be waxing and waning to some degree. In my brief visit with Noni, she did mention that she, Dax and their family had met with Palliative Care at [facility]. Noni noted that Dax \"wants to keep going\"--I discussed very briefly that we absolutely want to pursue the care goals that Dax has, and need to explore more in detail how we do this within the context of what his body will allow us to do. I would also like to assess in more detail patient's and family's understanding of the severity of Dax's illness as well as his prognosis.    --I will plan to call Noni tomorrow at 11am and also visit more with Dax tomorrow.     --I anticipate that patient and family would benefit from a care conference in the coming days that includes GI and Nephrology in addition to primary team, Palliative Care is happy to join this. Spoke with RNCC on 6B and noted tentative plan for care conference around 1pm Friday 7/9 pending " availability of medical teams.    These recommendations have been discussed with primary team via note, phone.      Thank you for the opportunity to participate in the care of this patient and family. Our team: will continue to follow.     During regular M-F work hours -- if you are not sure who specifically to contact -- please contact us by sending a text page to our team consult pager at [phone].    After regular work hours and on weekends/holidays, you can call our answering service at [phone]. Also, who's on call for us is available in [facility] [employer].     Total time spent was 45 minutes,  >50% of time was spent counseling and/or coordination of care regarding goals of care, support with coping, family support.    Cristal Kamara MD / Palliative Medicine / Pager [phone] / [facility] Inpatient Team Consult Pager [phone] (answered 8am-430pm M-F) - ok to text page via Lorena Gaxiola / After-Hours Answering Service [phone] / Palliative Clinic in the [facility] [place] - [phone] (scheduling); [phone] (triage).        Assessments:  Dax Villareal is a 30 year old male with PMH that includes EtOH hepatitis, ESLD with MELD-Na of 40 today, RADHA now dialysis-dependent. Pt was transferred to [facility] from OSH in [place] on 6/28/21 for further management of necrotizing pancreatitis with presumed infected wall off necrotic collection, decompensated liver disease and septic shock. Most recent imaging noting peripancreatic fluid collection largely decompressed without window for intervention at this time per GI note. Pt with difficulty maintaining nutrition, does not want artificial nutrition. Palliative Care consulted to assist with patient and family support, goals of care.    Today, the patient was seen for:  Acute necrotizing pancreatitis with presumed infected wall off necrotic collection  Alcohol hepatitis  ESLD   Acute kidney injury now dialysis-dependent    Prognosis, Goals, & Planning:  "     Functional Status just prior to hospitalization: Unable to determine      Prognosis, Goals, and/or Advance Care Planning were addressed today: Yes        Summary/Comments: briefly discussed, discussions initiated today      Patient's decision making preferences: shared with support from loved ones          Patient has decision-making capacity today for complex decisions: Partial (needs assistance with complex decisions)            I have concerns about the patient/family's health literacy today: Unable to assess today           Patient has a completed Health Care Directive: No.       Code status: Full Code    Coping, Meaning, & Spirituality:   Mood, coping, and/or meaning in the context of serious illness were addressed today: Yes  Summary/Comments: Patient's sister Noni notes that she is feeling overwhelmed with everything she is \"juggling\" at this time with her brother's serious illness and the demands from her employment. She notes that being able to set up optimal times to talk with medical providers helps her \"stay sane.\"    Social:     Living situation: was living in community, has been in hospital or TCU since May 2021    Key family / caregivers: sister, aunt    Occupational history: not explored today    Substance use history: h/o EtOH     Financial concerns: not explored today    Current in-home services: none    History of Present Illness:  History gathered today from: family/loved ones, medical chart, medical team members    Dax Villareal is a 30 year old male with PMH that includes EtOH hepatitis, ESLD with MELD-Na of 40 today, RADHA now dialysis-dependent. Pt was transferred to [facility] from OS in [place] on 6/28/21 for further management of necrotizing pancreatitis with presumed infected wall off necrotic collection, decompensated liver disease and septic shock. Most recent imaging noting peripancreatic fluid collection largely decompressed without window for intervention at this time per GI note. Pt " "with difficulty maintaining nutrition, does not want artificial nutrition. Palliative Care consulted to assist with patient and family support, goals of care.    Dax was able to tell me he feels \"ok\", his belly hurts some but he feels like pain medication is helpful for this. He is tolerating dialysis right now, doesn't feel uncomfortable during his runs, does feel some fatigue with dialysis. No SOB reported, no nausea reported. He has family visiting today and he is very much looking forward to visiting with his family.    Primary team did note that patient's mom is hospitalized right now and has a new diagnosis of cancer, and family has been feeling overwhelmed with significant health diagnoses in several family members recently. Primary team notes that up until about today, Pt has been having increased encephalopathy and has not been able to participate in conversations, his mental status is improved more today.     Key Palliative Symptom Data:  # Pain severity the last 12 hours: low  # Dyspnea severity the last 12 hours: none  # Nausea severity the last 12 hours: none  # Anxiety severity the last 12 hours: none    Patient is on opioids: bowels not assessed today.    ROS:  Comprehensive ROS is reviewed and is negative except as here & per HPI.     Past Medical History:  Past Medical History:   Diagnosis Date     ADHD 2008        Past Surgical History:  Past Surgical History:   Procedure Laterality Date     ORTHOPEDIC SURGERY Right     fracture repair         Family History:  Family History   Problem Relation Age of Onset     Attention Deficit Disorder Mother      Alcoholism Mother      Alcoholism Father      Alcoholism Brother      Alcoholism Sister          Allergies:  No Known Allergies     Medications:  I have reviewed this patient's medication profile and medications from this hospitalization.     Noted PRN meds are:  Dilaudid 0.5mg IV Q4hrs PRN    Physical Exam:  Vital Signs: Temp: 99  F (37.2  C) Temp src: " Oral BP: 108/62 Pulse: 115   Resp: 26 SpO2: 100 % O2 Device: Oxi Plus Oxygen Delivery: 3 LPM(for comfort, pt likely does not need O2 at this time)  Weight: 151 lbs .24 oz  Gen: NAD, ill-appearing, lying comfortably in bed, pleasant and cooperative with interview and exam  HEENT: normocephalic, +scleral icterus, no perioral lesions, oral mucosa moist  CV: RRR at time of exam  Pulm: good air movement bilaterally without wheezing  Abd: soft, +bs, nontender to light palpation diffusely, nondistended  LE: no edema bilaterally  Skin: significant jaundice on exam  Neuro: Alert, oriented to place and person and somewhat to situation  Psych: no agitation at time of my exam    MELD-Na score: 40 at 7/7/2021  6:23 AM  MELD score: 40 at 7/7/2021  6:23 AM  Calculated from:  Serum Creatinine: 3.91 mg/dL at 7/7/2021  6:23 AM  Serum Sodium: 134 mmol/L at 7/7/2021  6:23 AM  Total Bilirubin: 23.5 mg/dL at 7/7/2021  6:23 AM  INR(ratio): 2.18 at 7/5/2021  6:25 AM  Age: 30 years 8 months      Data reviewed:  Recent imaging reviewed, my comments on pertinents:   7/4/21 CT a/p w/o contrast  IMPRESSION (per Radiology):   1. Status post placement of a left lateral approach drainage catheter,  substantially decreased but persistent hemorrhagic walled off necrosis  centered in the lesser sac. A smaller collection along the inferior  aspect of the third portion of the duodenum is minimally changed.  2. Minimally changed moderate to large volume loculated ascites.  3. Hepatomegaly and hepatic steatosis with evidence of cirrhosis and  portal hypertension.  4. Diffuse edematous wall thickening of the small and large bowel,  likely reactive. No bowel obstruction.  5. Small right and trace left pleural effusions with adjacent  compressive atelectasis, similar to prior. Small pericardial effusion.  6. Faint patchy ground glass opacities in the right middle lobe and  and right upper lobe, likely infectious/inflammatory.    Recent lab data reviewed, my  comments on pertinents:   Today, K 3.3, T bili 23.5, ALT 24, , albumin 3.9, Tprotein 7.8, WBC 24.6, Hgb 7.3

## 2021-07-08 ENCOUNTER — APPOINTMENT (OUTPATIENT)
Dept: SPEECH THERAPY | Facility: CLINIC | Age: 31
End: 2021-07-08
Attending: INTERNAL MEDICINE
Payer: MEDICAID

## 2021-07-08 ENCOUNTER — APPOINTMENT (OUTPATIENT)
Dept: PHYSICAL THERAPY | Facility: CLINIC | Age: 31
End: 2021-07-08
Attending: INTERNAL MEDICINE
Payer: MEDICAID

## 2021-07-08 LAB
ALBUMIN SERPL-MCNC: 3.3 G/DL (ref 3.4–5)
ALP SERPL-CCNC: 185 U/L (ref 40–150)
ALT SERPL W P-5'-P-CCNC: 29 U/L (ref 0–70)
ANION GAP SERPL CALCULATED.3IONS-SCNC: 8 MMOL/L (ref 3–14)
AST SERPL W P-5'-P-CCNC: 132 U/L (ref 0–45)
BACTERIA SPEC CULT: NO GROWTH
BACTERIA SPEC CULT: NO GROWTH
BILIRUB SERPL-MCNC: 22.2 MG/DL (ref 0.2–1.3)
BUN SERPL-MCNC: 27 MG/DL (ref 7–30)
CALCIUM SERPL-MCNC: 8.9 MG/DL (ref 8.5–10.1)
CHLORIDE SERPL-SCNC: 99 MMOL/L (ref 94–109)
CO2 SERPL-SCNC: 23 MMOL/L (ref 20–32)
CREAT SERPL-MCNC: 2.6 MG/DL (ref 0.66–1.25)
ERYTHROCYTE [DISTWIDTH] IN BLOOD BY AUTOMATED COUNT: 24.4 % (ref 10–15)
GFR SERPL CREATININE-BSD FRML MDRD: 31 ML/MIN/{1.73_M2}
GLUCOSE SERPL-MCNC: 97 MG/DL (ref 70–99)
HCT VFR BLD AUTO: 22.6 % (ref 40–53)
HGB BLD-MCNC: 7.4 G/DL (ref 13.3–17.7)
LACTATE BLD-SCNC: 1.1 MMOL/L (ref 0.7–2)
MCH RBC QN AUTO: 32.2 PG (ref 26.5–33)
MCHC RBC AUTO-ENTMCNC: 32.7 G/DL (ref 31.5–36.5)
MCV RBC AUTO: 98 FL (ref 78–100)
PLATELET # BLD AUTO: 181 10E9/L (ref 150–450)
POTASSIUM SERPL-SCNC: 3.9 MMOL/L (ref 3.4–5.3)
PROT SERPL-MCNC: 7.8 G/DL (ref 6.8–8.8)
RBC # BLD AUTO: 2.3 10E12/L (ref 4.4–5.9)
SODIUM SERPL-SCNC: 131 MMOL/L (ref 133–144)
SPECIMEN SOURCE: NORMAL
SPECIMEN SOURCE: NORMAL
WBC # BLD AUTO: 25.9 10E9/L (ref 4–11)

## 2021-07-08 PROCEDURE — 120N000005 HC R&B MS OVERFLOW UMMC

## 2021-07-08 PROCEDURE — 36592 COLLECT BLOOD FROM PICC: CPT | Performed by: INTERNAL MEDICINE

## 2021-07-08 PROCEDURE — 99233 SBSQ HOSP IP/OBS HIGH 50: CPT | Performed by: PHYSICIAN ASSISTANT

## 2021-07-08 PROCEDURE — 80053 COMPREHEN METABOLIC PANEL: CPT | Performed by: STUDENT IN AN ORGANIZED HEALTH CARE EDUCATION/TRAINING PROGRAM

## 2021-07-08 PROCEDURE — 92526 ORAL FUNCTION THERAPY: CPT | Mod: GN

## 2021-07-08 PROCEDURE — 250N000013 HC RX MED GY IP 250 OP 250 PS 637: Performed by: STUDENT IN AN ORGANIZED HEALTH CARE EDUCATION/TRAINING PROGRAM

## 2021-07-08 PROCEDURE — 250N000009 HC RX 250

## 2021-07-08 PROCEDURE — 250N000013 HC RX MED GY IP 250 OP 250 PS 637: Performed by: INTERNAL MEDICINE

## 2021-07-08 PROCEDURE — 99233 SBSQ HOSP IP/OBS HIGH 50: CPT

## 2021-07-08 PROCEDURE — 250N000011 HC RX IP 250 OP 636: Performed by: STUDENT IN AN ORGANIZED HEALTH CARE EDUCATION/TRAINING PROGRAM

## 2021-07-08 PROCEDURE — 83605 ASSAY OF LACTIC ACID: CPT | Performed by: INTERNAL MEDICINE

## 2021-07-08 PROCEDURE — 36415 COLL VENOUS BLD VENIPUNCTURE: CPT | Performed by: STUDENT IN AN ORGANIZED HEALTH CARE EDUCATION/TRAINING PROGRAM

## 2021-07-08 PROCEDURE — 250N000013 HC RX MED GY IP 250 OP 250 PS 637

## 2021-07-08 PROCEDURE — 97530 THERAPEUTIC ACTIVITIES: CPT | Mod: GP

## 2021-07-08 PROCEDURE — 85027 COMPLETE CBC AUTOMATED: CPT | Performed by: STUDENT IN AN ORGANIZED HEALTH CARE EDUCATION/TRAINING PROGRAM

## 2021-07-08 PROCEDURE — 250N000011 HC RX IP 250 OP 636: Performed by: INTERNAL MEDICINE

## 2021-07-08 PROCEDURE — 99233 SBSQ HOSP IP/OBS HIGH 50: CPT | Mod: GC | Performed by: INTERNAL MEDICINE

## 2021-07-08 PROCEDURE — 99233 SBSQ HOSP IP/OBS HIGH 50: CPT | Performed by: INTERNAL MEDICINE

## 2021-07-08 RX ORDER — MIDODRINE HYDROCHLORIDE 5 MG/1
10 TABLET ORAL
Status: COMPLETED | OUTPATIENT
Start: 2021-07-09 | End: 2021-07-09

## 2021-07-08 RX ADMIN — LACTULOSE 20 G: 10 SOLUTION ORAL; RECTAL at 11:43

## 2021-07-08 RX ADMIN — LACTULOSE 20 G: 10 SOLUTION ORAL; RECTAL at 08:23

## 2021-07-08 RX ADMIN — Medication 550 MG: at 20:49

## 2021-07-08 RX ADMIN — POTASSIUM CHLORIDE 20 MEQ: 1.5 POWDER, FOR SOLUTION ORAL at 08:25

## 2021-07-08 RX ADMIN — HYDROMORPHONE HYDROCHLORIDE 0.5 MG: 1 INJECTION, SOLUTION INTRAMUSCULAR; INTRAVENOUS; SUBCUTANEOUS at 08:22

## 2021-07-08 RX ADMIN — HYDROMORPHONE HYDROCHLORIDE 0.5 MG: 1 INJECTION, SOLUTION INTRAMUSCULAR; INTRAVENOUS; SUBCUTANEOUS at 20:11

## 2021-07-08 RX ADMIN — Medication 550 MG: at 08:23

## 2021-07-08 RX ADMIN — MELATONIN TAB 3 MG 6 MG: 3 TAB at 23:06

## 2021-07-08 RX ADMIN — HYDROMORPHONE HYDROCHLORIDE 0.5 MG: 1 INJECTION, SOLUTION INTRAMUSCULAR; INTRAVENOUS; SUBCUTANEOUS at 12:34

## 2021-07-08 RX ADMIN — THIAMINE HCL TAB 100 MG 100 MG: 100 TAB at 08:25

## 2021-07-08 RX ADMIN — LACTULOSE 20 G: 10 SOLUTION ORAL; RECTAL at 16:51

## 2021-07-08 RX ADMIN — ACETAMINOPHEN 325 MG: 325 SOLUTION ORAL at 14:36

## 2021-07-08 RX ADMIN — POTASSIUM CHLORIDE 20 MEQ: 1.5 POWDER, FOR SOLUTION ORAL at 22:32

## 2021-07-08 RX ADMIN — LACTULOSE 20 G: 10 SOLUTION ORAL; RECTAL at 21:27

## 2021-07-08 RX ADMIN — HYDROMORPHONE HYDROCHLORIDE 0.5 MG: 1 INJECTION, SOLUTION INTRAMUSCULAR; INTRAVENOUS; SUBCUTANEOUS at 02:19

## 2021-07-08 RX ADMIN — FOLIC ACID 1 MG: 1 TABLET ORAL at 08:25

## 2021-07-08 RX ADMIN — Medication 40 MG: at 08:23

## 2021-07-08 RX ADMIN — MEROPENEM 500 MG: 500 INJECTION, POWDER, FOR SOLUTION INTRAVENOUS at 20:11

## 2021-07-08 ASSESSMENT — ACTIVITIES OF DAILY LIVING (ADL)
ADLS_ACUITY_SCORE: 19

## 2021-07-08 ASSESSMENT — MIFFLIN-ST. JEOR: SCORE: 1572.75

## 2021-07-08 NOTE — PROGRESS NOTES
Resident/Fellow Attestation   I, Eris Miles, was present with the medical/NAHUM student who participated in the service and in the documentation of the note.  I have verified the history and personally performed the physical exam and medical decision making.  I agree with the assessment and plan of care as documented in the note.      My changes are documented within the note     Eris Miles MD  PGY2  Date of Service (when I saw the patient): 07/08/21    [facility]    Progress Note - [place] 3 Service        Date of Admission:  6/28/2021    Assessment & Plan   Dax Villareal is a 30 year old male with hx of alcohol use disorder admitted on 6/28/2021 after recent prolonged admission at [facility] for acute alcoholic hepatitis who has ESLD c/b HE, ARF requiring HD, and SBP in the setting of acute necrotizing pancreatitis. He arrived with acute respiratory failure and septic shock that have resolved, but he requires continued management for ARF, hepatic encephalopathy, SBP and nutritional support.      Changes today:   - Lactulose via NG tube, monitor stool output   - Patient declined enteral nutrition and NJT placement. Continue calorie counts  - Diet switched to Regular Diet w/thin liquids per SLP  - Palliative care consulted with sister Noni today  - Plan for family care conference on Friday 7/9 at 2 pm      #Necrotizing pancreatitis w/ pseudocyst, improving  #SBP  #Profound leukocytosis, improving  Etiology of the septic shock that he came in with likely due to necrotizing pancreatitis +/- SBP. Had percutaneous drain placed for drainage of pancreatic collection. Diagnostic para returned with SBP, likely 2/2 pancreatitis (SAAG 0.4) but clouded by underlying liver disease. Repeat para completed 7/5. Per GI, no plan for intervention at this time after reviewing 7/4 CT.      - Continue meropenem 500 mg q24h  - s/p Albumin x3 days   - Daily CBC  -  MAP goal > 65   - GI consulted, appreciate recs   - Tylenol 325mg, liquid BID PRN for lower back pain  - IV Dilaudid 0.5mg q4h PRN    - GI believes that the patient's biggest need at this time from pancreatitis standpoint is adequate nutrition. They recommend NJT or PEG-J for enteral nutrition; however at this time the patient is refusing and would wish to continue PO. Will have to discuss during care conference on 7/9/21 the nutritional needs going forward.      # Decompensated EtOH cirrhosis c/b hepatic encephalopathy and recurrent ascites  # Hx of Alcohol use disorder c/b withdrawal   # Anemia  # Coagulopathy   Patient with a history of marked alcohol use and alcoholic hepatitis in the past. Was given a course of steroids (1 week) during previous admission at OSH. Patient mental status waxes and wanes but is stable, able to communicate needs. More lucid today compared to previous two days.      - Lactulose 20g QID and PRN and rifaximin 550mg BID (titrate to 1.5L stool/day)  - Trend MELD labs  - Daily folic acid and thiamine  - Delirium precautions  - Melatonin 6mg at bedtime  - Avoid benzos and opioids as able  - s/p 1 unit(s) pRBC 7/3  - Monitor hemoglobin  - Transfuse if hemoglobin < 7, platelet < 10 or < 30 with bleeding  - Continue to monitor for signs of bleeding    MELD-Na score: 40 at 7/7/2021  6:23 AM  MELD score: 40 at 7/7/2021  6:23 AM  Calculated from:  Serum Creatinine: 3.91 mg/dL at 7/7/2021  6:23 AM  Serum Sodium: 134 mmol/L at 7/7/2021  6:23 AM  Total Bilirubin: 23.5 mg/dL at 7/7/2021  6:23 AM  INR(ratio): 2.18 at 7/5/2021  6:25 AM  Age: 30 years 8 months    # ARF on HD (MWF)  # Hypervolemia causing acute respiratory failure, improving  - Nephrology consulted, recs appreciated     > Hemodialysis MWF              - Midodrine 10 mg PRN before dialysis sessions     > Daily chemistry labs, strict I/O, and daily weights  - Aspiration precautions  - Supplemental oxygen as needed to keep sats above  89%     # Severe malnutrition in setting of chronic illness  - Regular Diet, thin liquids per SLP   - Pt refusing NJT and enteral feeding; calorie counts initiated 7/7, if not meeting calorie counts will need to further discuss NJ vs PEG-J for enteral nutrition; ideally would have goals of care conversation first. GI team will be involved to discuss nutrition.  - Continue multivitamins  - Pantoprazole 40 mg IV daily  - Zofran PRN     Diet: Snacks/Supplements Adult: Ensure Clear; With Meals  Dysphagia Diet Level 3 Advanced Thin Liquids (water, ice chips, juice, milk gelatin, ice cream, etc)  Calorie Counts    DVT Prophylaxis: Pneumatic Compression Devices  Rdz Catheter: Not present  Fluids: 5 mL NS TKO  Central Lines: PRESENT  PICC Triple Lumen 06/28/21 Left-Site Assessment: WDL  CVC Double Lumen Right-Site Assessment: WDL  Code Status: Full Code      Disposition Plan   Expected discharge: > 7 days, recommended to transitional care unit once mental status at baseline, renal function and nutrition improved and necrotizing pancreatitis is well managed..     The patient's care was discussed with the Attending Physician, Dr. Flanagan.    Jaime Amin, MS3  Medical Student  [address]  Securely message with the Vocera Web Console (learn more here)  Text page via [employer] Paging/Directory  Please see sign in/sign out for up to date coverage information  ________________________________________________________    Interval History   Overnight, patient complained of back pain. Managed well with Dilaudid. Mental status stable throughout night, alert and oriented x4. Overall, patient looking significantly better today, encephalopathy improved from increasing lactulose dose yesterday.    Data reviewed today: I reviewed all medications, new labs and imaging results over the last 24 hours.    Physical Exam   Vital Signs: Temp: 98.2  F (36.8  C) Temp src: Oral BP: 98/53  Pulse: 116   Resp: 18 SpO2: 95 % O2 Device: None (Room air) Oxygen Delivery: 3 LPM(for comfort, pt likely does not need O2 at this time)  Weight: 148 lbs 12.97 oz    Gen: Awake, lying down in bed, appears markedly improved from yesterday and far more alert  HEENT: Conjunctival icterus  CV: Tachycardic. Regular rhythm.   Resp: Breathing comfortably on RA  Abd: Soft, distended, tender over epigastric area.   Skin: Jaundiced  Back: Continued lower back pain due to left flank RP drain with maroon/brown OP, no tenderness around tube insertion site  Ext: Well perfused, no edema  Neuro: Tremulous. Alert and oriented x4. Able to communicate needs well.      Data   Recent Labs   Lab 07/08/21  0538 07/07/21  0623 07/06/21  0515 07/05/21  0625 07/05/21  0625 07/04/21  0609 07/04/21  0609 07/03/21  0603 07/03/21  0603   WBC 25.9* 24.6* 28.6*   < > 32.7*  --  33.8*  --  35.2*   HGB 7.4* 7.3* 7.5*   < > 7.9*  --  7.8*   < > 6.7*   MCV 98 101* 100   < > 101*  --  100  --  103*    208 201   < > 211  --  184  --  147*   INR  --   --   --   --  2.18*  --  2.11*  --  2.08*   * 134 139  --  148*  --  144  --  137   POTASSIUM 3.9 3.3* 3.1*  --  3.1*   < > 2.8*  --  3.3*   CHLORIDE 99 102 107  --  116*  --  111*  --  104   CO2 23 18* 23  --  18*  --  21  --  23   BUN 27 53* 32*  --  52*  --  35*  --  23   CR 2.60* 3.91* 2.91*  --  4.74*  --  3.43*  --  2.66*   ANIONGAP 8 14 9  --  14  --  11  --  10   JANENE 8.9 9.1 9.1  --  9.0  --  8.6  --  8.4*   GLC 97 89 132*  --  123*  --  121*  --  94   ALBUMIN 3.3* 3.5 4.1  --  3.4  --  3.3*  --  2.9*   PROTTOTAL 7.8 7.8 8.8  --  7.8  --  7.1  --  6.9   BILITOTAL 22.2* 23.5* 24.4*  --  24.7*  --  24.1*  --  22.4*   ALKPHOS 185* 153* 150  --  168*  --  169*  --  151*   ALT 29 24 20  --  21  --  22  --  23   * 115* 97*  --  78*  --  84*  --  83*    < > = values in this interval not displayed.     No results found for this or any previous visit (from the past 24 hour(s)).

## 2021-07-08 NOTE — PROGRESS NOTES
Nephrology Progress Note  07/08/2021     Dax Villareal is a 30 year old male with h/o ETOH hepatitis, end stage liver disease, RADHA on HD, transferred from [facility] in [place] for septic shock on 6/28/21 for treatment of necrotizing pancreatitis and decompensated liver disease. He was initially admitted to [facility] 5/19/21.       Assessment & Recommendations:     1 RADHA - Remains dialysis dependent/anuric.   Last HD prior to hospital admission was 6/26/21. He has not had any OP HD at this time. Has been receiving inpatient HD at [facility] prior to transfer to [facility] on 6/28/21   - Etiology of RADHA felt to be JORGE L, abx, pancreatitis, sepsis, HRS   - Has Right TDC   - Will continue MWF schedule   - CRRT/HD consent obtained from sister Noni per phone   - TB is 22.2 which would also contribute to bilirubin toxicity   - Weekend staff discussed poor prognosis with patient's brother over the weekend given that patient is not a liver transplant candidate.    - Care conference scheduled tomorrow to discuss Goals of care   - Monitor for any recovery with daily chemistry labs/strict I/O and daily weights     2. Volume status - Overall his volume status is improved. No LE edema. Does have ascites. Not hypoxic and off supplemental oxygen. Sats > 96% on RA.    Weight will be unreliable given large ascites. SBP ~ 100/ Intake 450 ml/Output: UO none, NG ?, Drain 200 ml.    - Really does not require any fluid remove on HD at this time given I/O.    - If he agrees to TF then will begin removing fluid on HD    - Strict I/O please     3. CV - Shock 2/2 Sepsis: Echo on 6/10/21 showed EF of > 70% w/WMA. SBP ~ 100/   - Continue Midodrine 10 mg prior to HD     4. ETOH hepatitis/ recurrent ascites, HE - Patient with heavy ETOH use. Last ETOH unknown. Currently on Lactulose/Rifax, thiamine, Folate. T bili 22.2   - Per GI     5. Electrolytes - No acute concerns. K 3.9, Na 131     - Started on KCL 20 meq bid by primary team 7/2. Will have to monitor  "closely.      6.  Acid base - No acute concerns. Bicarb 23     7. BMD - Ca 8.9, Phos 3.9, albumin 3.3    8. Antimicrobials - Meropenem. Off Vanco. BC NTD.   WBC 25.9, Afebrile    Recommendations were communicated to primary team via progress note    Seen and discussed with Dr. Leigha Toledo, NP   [phone]    Interval History :   Nursing and provider notes from last 24 hours reviewed.  No acute renal changes    Review of Systems:   I reviewed the following systems:  Drowsy    Physical Exam:   I/O last 3 completed shifts:  In: 550 [P.O.:440; Other:20; NG/GT:90]  Out: 170 [Drains:170]   BP 98/53   Pulse 116   Temp 98.2  F (36.8  C) (Oral)   Resp 18   Ht 1.676 m (5' 6\")   Wt 67.5 kg (148 lb 13 oz)   SpO2 95%   BMI 24.02 kg/m       GENERAL APPEARANCE: Encephalopathic  EYES:  scleral icterus, pupils equal  HEENT: NGT present  Pulmonary: lungs clear to auscultation. Off supplemental oxygen   CV: tachy   - Edema - no LE edema  GI: large distended, tender. Has retroperitoneal drain  MS: no evidence of inflammation in joints, no muscle tenderness  SKIN: no rash, warm, dry, no cyanosis  NEURO: resting  ACCESS: Right TDC     Labs:   All labs reviewed by me  Electrolytes/Renal -   Recent Labs   Lab Test 07/08/21  0538 07/07/21  0623 07/06/21  0515 07/05/21  0625 07/04/21  0609 07/04/21  0609 06/30/21  0445 06/30/21  0445   * 134 139 148*  --  144   < > 138   POTASSIUM 3.9 3.3* 3.1* 3.1*   < > 2.8*   < > 3.1*   CHLORIDE 99 102 107 116*  --  111*   < > 104   CO2 23 18* 23 18*  --  21   < > 22   BUN 27 53* 32* 52*  --  35*   < > 34*   CR 2.60* 3.91* 2.91* 4.74*  --  3.43*   < > 3.02*   GLC 97 89 132* 123*  --  121*   < > 130*   JANENE 8.9 9.1 9.1 9.0  --  8.6   < > 8.4*   MAG  --   --   --  2.3  --  2.1  --  2.4*   PHOS  --  3.9  --  4.9*  --   --   --  5.3*    < > = values in this interval not displayed.       CBC -   Recent Labs   Lab Test 07/08/21  0538 07/07/21  0623 07/06/21  0515   WBC 25.9* 24.6* " 28.6*   HGB 7.4* 7.3* 7.5*    208 201       LFTs -   Recent Labs   Lab Test 07/08/21  0538 07/07/21  0623 07/06/21  0515   ALKPHOS 185* 153* 150   BILITOTAL 22.2* 23.5* 24.4*   ALT 29 24 20   * 115* 97*   PROTTOTAL 7.8 7.8 8.8   ALBUMIN 3.3* 3.5 4.1       Iron Panel - No lab results found.      Imaging:      Current Medications:    folic acid  1 mg Oral Daily    Or     folic acid  1 mg Intravenous Daily     lactulose  20 g Oral or NG Tube 4x Daily     melatonin  6 mg Oral At Bedtime     meropenem  500 mg Intravenous Q24H     midodrine  10 mg Oral Once in dialysis     pantoprazole  40 mg Oral QAM AC     potassium chloride  20 mEq Oral BID     rifaximin  550 mg Oral BID     sodium chloride (PF)  10 mL Irrigation Q8H     sodium chloride (PF)  3 mL Intracatheter Q8H     vitamin B1  100 mg Oral Daily    Or     thiamine  100 mg Intravenous Daily       - MEDICATION INSTRUCTIONS -       Tory Toledo, NP

## 2021-07-08 NOTE — PLAN OF CARE
Neuro: Lethargic at times arousing to voice, alert at other times. Oriented x4 this shift. On scheduled lactulose.  Cardiac: SR. VSS.       Respiratory: Sating >95 on RA. Lung sounds clear.  GI/: Unknown UOP, pt with liquid stool output into brief. Frequent incontinent BMs  Diet/appetite: Tolerating DD3 diet, assisted with feeding. appetite poor.   Activity:  Assist of 2 for turns and repositioning. Repositioned q 1-2h.  Pain: pt c/o back pain, relieved by repositioning.   Skin: No new deficits noted.   LDA's: L TL PICC, R CVC. NG. L flank drain. Meds given through NG.

## 2021-07-08 NOTE — PLAN OF CARE
Neuro: A&O x4.at other times. Slow to respond.  Cardiac: SR. VSS.       Respiratory: Sating WNL on RA. Lung sounds clear.  GI/: Unknown UOP, pt  with small loose Bm's x4.  Diet/appetite: DD3 diet ordered  Activity:  Assist of 1  for turns and repositioning. Repositioned q 1-2h.  Pain: pt c/o back pain, dilaudid IV x1 for pain.  Skin: No new deficits noted.   LDA's: L TL PICC, R CVC. NG. L flank drain.   Continue with POC. Notify MDs with concerns.

## 2021-07-08 NOTE — PROGRESS NOTES
Calorie Count  Intake recorded for: 7/7  Total Kcals: 106 Total Protein: 4g  Kcals from Hospital Food: 106   Protein: 4g  Kcals from Outside Food (average):0  Protein: 0g  # Meals Ordered from Kitchen: 1 meal  # Meals Recorded: 1 meal (50% penne w/ spaghetti sauce)  # Supplements Recorded: No intake recorded

## 2021-07-08 NOTE — PROGRESS NOTES
GASTROENTEROLOGY PROGRESS NOTE    Date of Admission: 6/28/2021  Reason for Admission: pancreatitis/hepatitis      ASSESSMENT/RECOMMENDATIONS:  30 year old male with a history of heavy alcohol use who presented to OSH 5/19 for abdominal pain, nausea and vomiting, admitted for alcohol hepatitis, ESLD, RADHA on HD, septic shock requiring pressors as well as necrotizing pancreatitis with large evolving necrotic collection. Transferred to [facility] for consideration of drainage of presumed infected necrotic collection     #. Acute necrotizing pancreatitis with presumed infected walled off necrotic collection  #. Septic shock  #. Leukocytosis  #. Peritonitis  Unclear evolution of pancreatitis history but had a recent CT scan with large evolving necrotic collection, presumed infected. No obvious gas in collection. CT on admission to [facility] showing mature collection with enhancing wall measuring ~[id number]. Unfortunately he has profound coagulopathy (liver disease + malnutrition +sepsis), INR 3.8 on transfer so was given FFP and Vit K and went for IR perc drain am of 6/29. He may eventually warrant endoscopic drainage at some point however repeat imaging (7/4) shows that collection is largely decompressed and no great endoscopic window at this point.  Etiology: alcohol  Date of onset: 5/19  BISAP score on admission: unclear  Concurrent organ failure: respiratory (now extubated), renal (on HD), liver, cardiogenic (weaned off pressors 6/29)  Thromboses: none  Diabetes: no  Current nutrition access: NJT placed 6/30 but patient removed it shortly after (has repeatedly refused replacement)  Last imaging: CT scan with IV contrast 7/4  Infection/antiinfectives (cultures negative so far):                 - Meropenem (6/29 - present)                - Vancomycin (6/29 - 6/30)     - Micafungin (6/29 - 7/1)   Intervention:    6/30 - Left IR RP perc drain (24F)    7/2 - Diagnostic paracentesis (WBC 2663 - 74% PMN, SAAG <1.1, protein 4.1)  "    7/5 - Repeat paracentesis (WBC 1801 - 61% PMN, lipase 48)    -Drain management per IR, await cultures from drain output  -Plan to repeat CT scan abd/pelv Sunday if family wishes to pursue further aggressive cares  -Advance diet, calorie counts  -Ideally would replace NJFT vs PEG-J placement (to discuss GOC with family tomorrow)  -Continue antibiotics, follow cultures     #. Alcohol hepatitis  #. Hyperbilirubinemia  #. Ascites  #. Coagulopathy/Thrombocytopenia  #. Encephalopathy  Known heavy alcohol use. Reportedly received course of steroids at OSH for 1 week with \"some improvement\". Currently has primarily hyperbilirubinemia and mildly elevated AST. Unclear if he has underlying cirrhosis but certainly would fit with alcohol hepatitis vs elevated liver tests of critical illness/sepsis. Not candidate for steroids right now. He is receiving lactulose and Xifaxin for encephalopathy but having a lot of stool output. Hepatology notified of admission, ongoing discussions with them. Not currently liver transplant candidate.  -Trend MELD labs  Current MELD is 40, associated with at minimum 50% mortality in the next 3 months   MELD-Na score: 40 at 7/7/2021  6:23 AM  MELD score: 40 at 7/7/2021  6:23 AM  Calculated from:  Serum Creatinine: 3.91 mg/dL at 7/7/2021  6:23 AM  Serum Sodium: 134 mmol/L at 7/7/2021  6:23 AM  Total Bilirubin: 23.5 mg/dL at 7/7/2021  6:23 AM  INR(ratio): 2.18 at 7/5/2021  6:25 AM  Age: 30 years 8 months  -Continue lactulose and titrate to 3 soft stools per day -- please record stool output  -Alcohol abstinence    This patient has a very poor overall prognosis and agree with palliative care consultation and GOC discussions with the family tomorrow    The patient was discussed and plan agreed upon with GI staff, Dr Castro Norwood, PA-C  Advanced Endoscopy/Pancreaticobiliary GI Service  [facility]  Text " "Page  _______________________________________________________________      Subjective: Nursing notes and 24hr events reviewed. Patient seen and examined at 0930. Reports that he ate \"a whole bunch\" yesterday. Nutrition notes only record 50% of some penne pasta, 106kcals. Also reports constant stooling (\"at least 20x yesterday\")    ROS:   4 pt ROS negative unless noted in subjective.     Objective:  Blood pressure 97/51, pulse 112, temperature 97.9  F (36.6  C), temperature source Oral, resp. rate 17, height 1.676 m (5' 6\"), weight 67.5 kg (148 lb 13 oz), SpO2 96 %.  Gen: Laying in bed. Appears comfortable  HEENT: NCAT. Conjunctiva clear. Sclera ++icteric  CV: tachycardic, Peripheral perfusion intact  Resp: non labored breathing  Abd: minimal distention, not tender, dark red/brown output in perc drain  Msk: no gross deformity  Skin:  ++ jaundice  Ext: warm, well perfused  Neuro: no asterixis but generally tremulous  Mental status/Psych: alert and oriented, flat affect and minimally interactive      Date 07/01/21 0700 - 07/02/21 0659   Shift 6735-3697 9679-2753 5505-1120 24 Hour Total   INTAKE   I.V. 5   5   Enteral 0   0   Shift Total(mL/kg) 5(0.07)   5(0.07)   OUTPUT   Shift Total(mL/kg)       Weight (kg) 75.7 75.7 75.7 75.7       LABS:  BMP  Recent Labs   Lab 07/08/21  0538 07/07/21  0623 07/06/21  0515 07/05/21  0625   * 134 139 148*   POTASSIUM 3.9 3.3* 3.1* 3.1*   CHLORIDE 99 102 107 116*   JANENE 8.9 9.1 9.1 9.0   CO2 23 18* 23 18*   BUN 27 53* 32* 52*   CR 2.60* 3.91* 2.91* 4.74*   GLC 97 89 132* 123*     CBC  Recent Labs   Lab 07/08/21  0538 07/07/21  0623 07/06/21  0515 07/05/21  1809   WBC 25.9* 24.6* 28.6* 29.4*   RBC 2.30* 2.29* 2.32* 2.32*   HGB 7.4* 7.3* 7.5* 7.4*   HCT 22.6* 23.2* 23.3* 23.2*   MCV 98 101* 100 100   MCH 32.2 31.9 32.3 31.9   MCHC 32.7 31.5 32.2 31.9   RDW 24.4* 25.6* Dimorphic population - unable to calculate Dimorphic population - unable to calculate    208 201 180 "     INR  Recent Labs   Lab 07/05/21  0625 07/04/21  0609 07/03/21  0603 07/02/21  0505   INR 2.18* 2.11* 2.08* 2.24*     LFTs  Recent Labs   Lab 07/08/21  0538 07/07/21  0623 07/06/21  0515 07/05/21  0625   ALKPHOS 185* 153* 150 168*   * 115* 97* 78*   ALT 29 24 20 21   BILITOTAL 22.2* 23.5* 24.4* 24.7*   PROTTOTAL 7.8 7.8 8.8 7.8   ALBUMIN 3.3* 3.5 4.1 3.4      PANC  No lab results found in last 7 days.      IMAGING:  CT abd/pelv without IV contrast 7/4  IMPRESSION:   1. Status post placement of a left lateral approach drainage catheter,  substantially decreased but persistent hemorrhagic walled off necrosis  centered in the lesser sac. A smaller collection along the inferior  aspect of the third portion of the duodenum is minimally changed.  2. Minimally changed moderate to large volume loculated ascites.  3. Hepatomegaly and hepatic steatosis with evidence of cirrhosis and  portal hypertension.  4. Diffuse edematous wall thickening of the small and large bowel,  likely reactive. No bowel obstruction.  5. Small right and trace left pleural effusions with adjacent  compressive atelectasis, similar to prior. Small pericardial effusion.  6. Faint patchy ground glass opacities in the right middle lobe and  and right upper lobe, likely infectious/inflammatory.

## 2021-07-09 ENCOUNTER — APPOINTMENT (OUTPATIENT)
Dept: PHYSICAL THERAPY | Facility: CLINIC | Age: 31
End: 2021-07-09
Attending: INTERNAL MEDICINE
Payer: MEDICAID

## 2021-07-09 ENCOUNTER — APPOINTMENT (OUTPATIENT)
Dept: SPEECH THERAPY | Facility: CLINIC | Age: 31
End: 2021-07-09
Attending: INTERNAL MEDICINE
Payer: MEDICAID

## 2021-07-09 LAB
ALBUMIN SERPL-MCNC: 3 G/DL (ref 3.4–5)
ALP SERPL-CCNC: 181 U/L (ref 40–150)
ALT SERPL W P-5'-P-CCNC: 24 U/L (ref 0–70)
ANION GAP SERPL CALCULATED.3IONS-SCNC: 12 MMOL/L (ref 3–14)
AST SERPL W P-5'-P-CCNC: 114 U/L (ref 0–45)
BILIRUB SERPL-MCNC: 20.2 MG/DL (ref 0.2–1.3)
BUN SERPL-MCNC: 43 MG/DL (ref 7–30)
CALCIUM SERPL-MCNC: 8.5 MG/DL (ref 8.5–10.1)
CHLORIDE SERPL-SCNC: 97 MMOL/L (ref 94–109)
CO2 SERPL-SCNC: 20 MMOL/L (ref 20–32)
CREAT SERPL-MCNC: 3.37 MG/DL (ref 0.66–1.25)
ERYTHROCYTE [DISTWIDTH] IN BLOOD BY AUTOMATED COUNT: 23.5 % (ref 10–15)
GFR SERPL CREATININE-BSD FRML MDRD: 23 ML/MIN/{1.73_M2}
GLUCOSE SERPL-MCNC: 92 MG/DL (ref 70–99)
HCT VFR BLD AUTO: 21.2 % (ref 40–53)
HGB BLD-MCNC: 7 G/DL (ref 13.3–17.7)
INR PPP: 1.9 (ref 0.86–1.14)
LACTATE BLD-SCNC: 2.8 MMOL/L (ref 0.7–2)
MCH RBC QN AUTO: 32.6 PG (ref 26.5–33)
MCHC RBC AUTO-ENTMCNC: 33 G/DL (ref 31.5–36.5)
MCV RBC AUTO: 99 FL (ref 78–100)
PLATELET # BLD AUTO: 191 10E9/L (ref 150–450)
POTASSIUM SERPL-SCNC: 3.7 MMOL/L (ref 3.4–5.3)
PROT SERPL-MCNC: 7.6 G/DL (ref 6.8–8.8)
RBC # BLD AUTO: 2.15 10E12/L (ref 4.4–5.9)
SODIUM SERPL-SCNC: 129 MMOL/L (ref 133–144)
WBC # BLD AUTO: 24.7 10E9/L (ref 4–11)

## 2021-07-09 PROCEDURE — 99233 SBSQ HOSP IP/OBS HIGH 50: CPT | Performed by: INTERNAL MEDICINE

## 2021-07-09 PROCEDURE — 258N000003 HC RX IP 258 OP 636

## 2021-07-09 PROCEDURE — 90937 HEMODIALYSIS REPEATED EVAL: CPT

## 2021-07-09 PROCEDURE — 250N000009 HC RX 250

## 2021-07-09 PROCEDURE — 99232 SBSQ HOSP IP/OBS MODERATE 35: CPT | Performed by: PHYSICIAN ASSISTANT

## 2021-07-09 PROCEDURE — 80053 COMPREHEN METABOLIC PANEL: CPT | Performed by: STUDENT IN AN ORGANIZED HEALTH CARE EDUCATION/TRAINING PROGRAM

## 2021-07-09 PROCEDURE — 36592 COLLECT BLOOD FROM PICC: CPT | Performed by: STUDENT IN AN ORGANIZED HEALTH CARE EDUCATION/TRAINING PROGRAM

## 2021-07-09 PROCEDURE — 92526 ORAL FUNCTION THERAPY: CPT | Mod: GN

## 2021-07-09 PROCEDURE — 97530 THERAPEUTIC ACTIVITIES: CPT | Mod: GP

## 2021-07-09 PROCEDURE — 83605 ASSAY OF LACTIC ACID: CPT | Performed by: STUDENT IN AN ORGANIZED HEALTH CARE EDUCATION/TRAINING PROGRAM

## 2021-07-09 PROCEDURE — 250N000013 HC RX MED GY IP 250 OP 250 PS 637: Performed by: STUDENT IN AN ORGANIZED HEALTH CARE EDUCATION/TRAINING PROGRAM

## 2021-07-09 PROCEDURE — 99233 SBSQ HOSP IP/OBS HIGH 50: CPT | Mod: GC | Performed by: INTERNAL MEDICINE

## 2021-07-09 PROCEDURE — 250N000013 HC RX MED GY IP 250 OP 250 PS 637: Performed by: INTERNAL MEDICINE

## 2021-07-09 PROCEDURE — 85027 COMPLETE CBC AUTOMATED: CPT | Performed by: STUDENT IN AN ORGANIZED HEALTH CARE EDUCATION/TRAINING PROGRAM

## 2021-07-09 PROCEDURE — 250N000013 HC RX MED GY IP 250 OP 250 PS 637

## 2021-07-09 PROCEDURE — 250N000011 HC RX IP 250 OP 636: Performed by: INTERNAL MEDICINE

## 2021-07-09 PROCEDURE — 120N000005 HC R&B MS OVERFLOW UMMC

## 2021-07-09 PROCEDURE — 85610 PROTHROMBIN TIME: CPT | Performed by: STUDENT IN AN ORGANIZED HEALTH CARE EDUCATION/TRAINING PROGRAM

## 2021-07-09 PROCEDURE — 250N000011 HC RX IP 250 OP 636: Performed by: STUDENT IN AN ORGANIZED HEALTH CARE EDUCATION/TRAINING PROGRAM

## 2021-07-09 PROCEDURE — 99233 SBSQ HOSP IP/OBS HIGH 50: CPT

## 2021-07-09 RX ORDER — CALCIUM CARBONATE 500 MG/1
500 TABLET, CHEWABLE ORAL 3 TIMES DAILY PRN
Status: DISCONTINUED | OUTPATIENT
Start: 2021-07-09 | End: 2021-10-12 | Stop reason: HOSPADM

## 2021-07-09 RX ORDER — ALBUMIN (HUMAN) 12.5 G/50ML
50 SOLUTION INTRAVENOUS
Status: DISCONTINUED | OUTPATIENT
Start: 2021-07-09 | End: 2021-07-09

## 2021-07-09 RX ADMIN — HYDROMORPHONE HYDROCHLORIDE 0.5 MG: 1 INJECTION, SOLUTION INTRAMUSCULAR; INTRAVENOUS; SUBCUTANEOUS at 13:54

## 2021-07-09 RX ADMIN — MEROPENEM 500 MG: 500 INJECTION, POWDER, FOR SOLUTION INTRAVENOUS at 20:41

## 2021-07-09 RX ADMIN — LACTULOSE 20 G: 10 SOLUTION ORAL; RECTAL at 17:09

## 2021-07-09 RX ADMIN — CALCIUM CARBONATE (ANTACID) CHEW TAB 500 MG 500 MG: 500 CHEW TAB at 17:09

## 2021-07-09 RX ADMIN — ACETAMINOPHEN 325 MG: 325 SOLUTION ORAL at 20:41

## 2021-07-09 RX ADMIN — Medication 40 MG: at 06:48

## 2021-07-09 RX ADMIN — HYDROMORPHONE HYDROCHLORIDE 0.5 MG: 1 INJECTION, SOLUTION INTRAMUSCULAR; INTRAVENOUS; SUBCUTANEOUS at 09:51

## 2021-07-09 RX ADMIN — HYDROMORPHONE HYDROCHLORIDE 0.5 MG: 1 INJECTION, SOLUTION INTRAMUSCULAR; INTRAVENOUS; SUBCUTANEOUS at 05:50

## 2021-07-09 RX ADMIN — MELATONIN TAB 3 MG 6 MG: 3 TAB at 22:26

## 2021-07-09 RX ADMIN — POTASSIUM CHLORIDE 20 MEQ: 1.5 POWDER, FOR SOLUTION ORAL at 20:41

## 2021-07-09 RX ADMIN — POTASSIUM CHLORIDE 20 MEQ: 1.5 POWDER, FOR SOLUTION ORAL at 06:51

## 2021-07-09 RX ADMIN — LACTULOSE 20 G: 10 SOLUTION ORAL; RECTAL at 13:51

## 2021-07-09 RX ADMIN — SODIUM CHLORIDE 300 ML: 9 INJECTION, SOLUTION INTRAVENOUS at 09:10

## 2021-07-09 RX ADMIN — HYDROMORPHONE HYDROCHLORIDE 0.5 MG: 1 INJECTION, SOLUTION INTRAMUSCULAR; INTRAVENOUS; SUBCUTANEOUS at 22:26

## 2021-07-09 RX ADMIN — MIDODRINE HYDROCHLORIDE 10 MG: 5 TABLET ORAL at 09:30

## 2021-07-09 RX ADMIN — Medication 550 MG: at 20:41

## 2021-07-09 RX ADMIN — THIAMINE HCL TAB 100 MG 100 MG: 100 TAB at 06:47

## 2021-07-09 RX ADMIN — MIDODRINE HYDROCHLORIDE 10 MG: 5 TABLET ORAL at 06:45

## 2021-07-09 RX ADMIN — FOLIC ACID 1 MG: 1 TABLET ORAL at 13:54

## 2021-07-09 RX ADMIN — SODIUM CHLORIDE 250 ML: 9 INJECTION, SOLUTION INTRAVENOUS at 09:11

## 2021-07-09 RX ADMIN — Medication: at 09:13

## 2021-07-09 RX ADMIN — HYDROMORPHONE HYDROCHLORIDE 0.5 MG: 1 INJECTION, SOLUTION INTRAMUSCULAR; INTRAVENOUS; SUBCUTANEOUS at 18:00

## 2021-07-09 RX ADMIN — HYDROMORPHONE HYDROCHLORIDE 0.5 MG: 1 INJECTION, SOLUTION INTRAMUSCULAR; INTRAVENOUS; SUBCUTANEOUS at 00:17

## 2021-07-09 RX ADMIN — Medication 550 MG: at 06:50

## 2021-07-09 RX ADMIN — LACTULOSE 20 G: 10 SOLUTION ORAL; RECTAL at 20:41

## 2021-07-09 ASSESSMENT — ACTIVITIES OF DAILY LIVING (ADL)
ADLS_ACUITY_SCORE: 19
ADLS_ACUITY_SCORE: 20
ADLS_ACUITY_SCORE: 19
ADLS_ACUITY_SCORE: 20
ADLS_ACUITY_SCORE: 22

## 2021-07-09 ASSESSMENT — MIFFLIN-ST. JEOR: SCORE: 1581.75

## 2021-07-09 NOTE — PLAN OF CARE
"Neuro: A&Ox4 throughout the shift, however pt refused orientation questions at 0400, refused to answer, verbalized \"I'm fine\"  Cardiac: ST. VSS.   Respiratory: Sating adequately on RA.  GI/: Multiple BM's throughout the shift.  Diet/appetite: Tolerating regular diet.   Activity:  Assist of lift, repositioned throughout the night.  Pain: At acceptable level on current regimen.   Skin: No new deficits noted.  LDA's: NG, PICC, HD line, L flank drain    Plan: Continue with POC. Notify primary team with changes.   "

## 2021-07-09 NOTE — PROGRESS NOTES
[facility]  Palliative Care Daily Progress Note       Recommendations & Counseling     Co-visit with myself and PCSW counselor completed today by phone with Pt's sister Noni. The focus of our phone visit today was on support for Noni. Noni notes that a care conference next week either Wednesday or Thursday would work best for her, with Thursday being the best day as she can have a close friend of hers and Dax's present in person and this friend is a source of significant support for her. Thursday would also allow Palliative Care SW counselor Susan to be present for care conference (Susan is not available Wednesday). There is no indication or concern at this time that treatment course so far is not in line with Dax's wishes. I do worry that Dax may not have full insight into the severity of his illness (and his mental status could certainly be waxing and waning), but with our conversation today, I do feel Noni understands Dax's illness severity and at least to some degree prognosis (for example, she was able to voice understanding that Dax will die of his current conditions/diagnoses).       Recommend care conference next week, Thursday afternoon works best for Noni and allows her to have additional support with aunt and close family friend.       Our team will continue to check in with Noni this next week and offer continued support with coping for her.    Total time spent was 40 minutes,  >50% of time was spent counseling and/or coordination of care regarding support with coping, family support.    Cristal Kamara Team Consult Pager [phone] (answered 8am-430pm M-F) - ok to text page via [facility] / After-Hours Answering Service [phone] / Palliative Clinic in the [facility] at the [facility] - [phone] (scheduling); [phone] (triage).      Assessments          Dax Villareal is a 30 year old male with PMH that includes EtOH hepatitis, ESLD, RADHA  now dialysis-dependent. Pt was transferred to [facility] from OS in [place] on 6/28/21 for further management of necrotizing pancreatitis with presumed infected wall off necrotic collection, decompensated liver disease and septic shock. Persistent peritonitis on serial paracentesis. Most recent imaging noting peripancreatic fluid collection largely decompressed without window for intervention at this time per GI note. Pt with difficulty maintaining nutrition, does not want artificial nutrition. Palliative Care consulted to assist with patient and family support, goals of care. Care conference initially planned today, but moved to next week per request of family.     Today, the patient was seen for:  Acute necrotizing pancreatitis with presumed infected wall off necrotic collection  Alcohol hepatitis  ESLD   Acute kidney injury now dialysis-dependent  patient and family support  goals of care    Prognosis, Goals, or Advance Care Planning was addressed today with: Yes.  Mood, coping, and/or meaning in the context of serious illness were addressed today: Yes.  Summary/Comments: see above            Interval History:     Chart review/discussion with unit or clinical team members:   No acute events overnight. Care conference today.     Per patient or family/caregivers today:  Pt visiting with friend this afternoon, in good spirits and without acute complaints. He is trying to eat, has sandwiches that friend brought in on table and tells me he has ordered a pizza. No nausea, no pain, no SOB reported.     Key Palliative Symptoms:  # Pain severity the last 12 hours: none  # Dyspnea severity the last 12 hours: none  # Nausea severity the last 12 hours: none  # Anxiety severity the last 12 hours: none    Patient is on opioids: bowels not assessed today.           Review of Systems:     Besides above, an additional 2 system ROS was reviewed and is unremarkable          Medications:     I have reviewed this patient's medication  profile and medications during this hospitalization.           Physical Exam:   Vitals were reviewed  Temp: 98.8  F (37.1  C) Temp src: Oral BP: 107/60 Pulse: 114   Resp: 18 SpO2: 97 % O2 Device: None (Room air)    Gen: NAD, chronically ill-appearing, lying comfortably in bed, cooperative with interview and exam  HEENT: normocephalic, marked scleral icterus, no perioral lesions, oral mucosa moist  CV: RRR at time of exam  Pulm: good air movement bilaterally without wheezing  Abd: soft, +bs, nontender to palpation diffusely, nondistended  LE: no edema bilaterally  Skin: marked jaundice on limited exam  Neuro: Alert, oriented to place and person but not fully to situation  Psych: mood-affect congruent    MELD-Na score: 38 at 7/9/2021  6:26 AM  MELD score: 37 at 7/9/2021  6:26 AM  Calculated from:  Serum Creatinine: 3.37 mg/dL at 7/9/2021  6:26 AM  Serum Sodium: 129 mmol/L at 7/9/2021  6:26 AM  Total Bilirubin: 20.2 mg/dL at 7/9/2021  6:26 AM  INR(ratio): 1.90 at 7/9/2021  6:26 AM  Age: 30 years 8 months               Data Reviewed:     Reviewed recent pertinent imaging, comments:   No new imaging over last 24 hours.    Reviewed recent labs, comments:   Today, Na 129, K 3.7, Tbili 20.2, ALT 24, , WBC 24.7, Hgb 7.0, plts 191

## 2021-07-09 NOTE — PROGRESS NOTES
Phone call to Pts Sister, Noni to inform her of time change for care conference today. Noni is in [place] and states she is too overwhelmed for conference today, potentially early next week. I have notified all providers of cancellation of care conference today.    Junie Morris RN   6B care coordinator #[phone]

## 2021-07-09 NOTE — PROGRESS NOTES
GASTROENTEROLOGY PROGRESS NOTE    Date of Admission: 6/28/2021  Reason for Admission: pancreatitis/hepatitis      ASSESSMENT/RECOMMENDATIONS:  30 year old male with a history of heavy alcohol use who presented to OSH 5/19 for abdominal pain, nausea and vomiting, admitted for alcohol hepatitis, ESLD, RADHA on HD, septic shock requiring pressors as well as necrotizing pancreatitis with large evolving necrotic collection. Transferred to [facility] for consideration of drainage of presumed infected necrotic collection     #. Acute necrotizing pancreatitis with presumed infected walled off necrotic collection  #. Septic shock  #. Leukocytosis  #. Peritonitis  Unclear evolution of pancreatitis history but had a recent CT scan with large evolving necrotic collection, presumed infected. No obvious gas in collection. CT on admission to [facility] showing mature collection with enhancing wall measuring ~[id number]. Unfortunately he has profound coagulopathy (liver disease + malnutrition +sepsis), INR 3.8 on transfer so was given FFP and Vit K and went for IR perc drain 6/29. He may eventually warrant endoscopic drainage at some point however repeat imaging (7/4) shows that collection is largely decompressed and no great endoscopic window at this point.  Etiology: alcohol  Date of onset: 5/19  BISAP score on admission: unclear  Concurrent organ failure: respiratory (now extubated), renal (on HD), liver, cardiogenic (weaned off pressors 6/29)  Thromboses: none  Diabetes: no  Current nutrition access: NJT placed 6/30 but patient removed it shortly after (has repeatedly refused replacement)  Last imaging: CT scan with IV contrast 7/4  Infection/antiinfectives (cultures negative so far):                 - Meropenem (6/29 - present)                - Vancomycin (6/29 - 6/30)     - Micafungin (6/29 - 7/1)   Intervention:    6/30 - Left IR RP perc drain (24F)    7/2 - Diagnostic paracentesis (WBC 2663 - 74% PMN, SAAG <1.1, protein 4.1)     7/5  "- Repeat paracentesis (WBC 1801 - 61% PMN, lipase 48)    -Drain management per IR, await cultures from drain output  -Plan to repeat CT scan abd/pelv Sunday  -Advance diet, calorie counts  -Ideally would replace NJFT vs PEG-J placement next week if not meeting calorie requirements  -Continue antibiotics, follow cultures     #. Alcohol hepatitis  #. Hyperbilirubinemia  #. Ascites  #. Coagulopathy/Thrombocytopenia  #. Encephalopathy  Known heavy alcohol use. Reportedly received course of steroids at OSH for 1 week with \"some improvement\". Currently has primarily hyperbilirubinemia and mildly elevated AST. Unclear if he has underlying cirrhosis but certainly would fit with alcohol hepatitis vs elevated liver tests of critical illness/sepsis. Not candidate for steroids right now. He is receiving lactulose and Xifaxin for encephalopathy but having a lot of stool output. Hepatology notified of admission, ongoing discussions with them. Not currently liver transplant candidate.  -Trend MELD labs  Current MELD is 38, associated with at minimum 50% mortality in the next 3 months   MELD-Na score: 38 at 7/9/2021  6:26 AM  MELD score: 37 at 7/9/2021  6:26 AM  Calculated from:  Serum Creatinine: 3.37 mg/dL at 7/9/2021  6:26 AM  Serum Sodium: 129 mmol/L at 7/9/2021  6:26 AM  Total Bilirubin: 20.2 mg/dL at 7/9/2021  6:26 AM  INR(ratio): 1.90 at 7/9/2021  6:26 AM  Age: 30 years 8 months  -Continue lactulose and titrate to 3 soft stools per day -- please record stool output  -Alcohol abstinence    This patient has a very poor overall prognosis and agree with palliative care consultation and GOC discussions with the family    The patient was discussed and plan agreed upon with GI staff, Dr Castro Norwood, PA-C  Advanced Endoscopy/Pancreaticobiliary GI Service  [facility]  Text Page  _______________________________________________________________      Subjective: Nursing notes and 24hr " "events reviewed. Patient seen and examined. More alert today, had some pizza yesterday and is motivated to increase his oral intake. No fevers.     ROS:   4 pt ROS negative unless noted in subjective.     Objective:  Blood pressure 93/51, pulse 120, temperature 97.8  F (36.6  C), temperature source Axillary, resp. rate 18, height 1.676 m (5' 6\"), weight 68.4 kg (150 lb 12.7 oz), SpO2 95 %.  Gen: Laying in bed. Appears comfortable  HEENT: NCAT. Conjunctiva clear. Sclera ++icteric  CV: tachycardic, Peripheral perfusion intact  Resp: non labored breathing  Abd: minimal distention, not tender, minimal dark red/brown output in perc drain  Msk: no gross deformity  Skin:  ++ jaundice  Ext: warm, well perfused  Neuro: non specific  Mental status/Psych: alert and oriented, flat affect and more lucid today      Date 07/01/21 0700 - 07/02/21 0659   Shift 7701-2640 8902-2753 4262-7984 24 Hour Total   INTAKE   I.V. 5   5   Enteral 0   0   Shift Total(mL/kg) 5(0.07)   5(0.07)   OUTPUT   Shift Total(mL/kg)       Weight (kg) 75.7 75.7 75.7 75.7       LABS:  BMP  Recent Labs   Lab 07/09/21  0626 07/08/21  0538 07/07/21  0623 07/06/21  0515   * 131* 134 139   POTASSIUM 3.7 3.9 3.3* 3.1*   CHLORIDE 97 99 102 107   JANENE 8.5 8.9 9.1 9.1   CO2 20 23 18* 23   BUN 43* 27 53* 32*   CR 3.37* 2.60* 3.91* 2.91*   GLC 92 97 89 132*     CBC  Recent Labs   Lab 07/09/21  0626 07/08/21  0538 07/07/21  0623 07/06/21  0515   WBC 24.7* 25.9* 24.6* 28.6*   RBC 2.15* 2.30* 2.29* 2.32*   HGB 7.0* 7.4* 7.3* 7.5*   HCT 21.2* 22.6* 23.2* 23.3*   MCV 99 98 101* 100   MCH 32.6 32.2 31.9 32.3   MCHC 33.0 32.7 31.5 32.2   RDW 23.5* 24.4* 25.6* Dimorphic population - unable to calculate    181 208 201     INR  Recent Labs   Lab 07/09/21  0626 07/05/21  0625 07/04/21  0609 07/03/21  0603   INR 1.90* 2.18* 2.11* 2.08*     LFTs  Recent Labs   Lab 07/09/21  0626 07/08/21  0538 07/07/21  0623 07/06/21  0515   ALKPHOS 181* 185* 153* 150   * 132* " 115* 97*   ALT 24 29 24 20   BILITOTAL 20.2* 22.2* 23.5* 24.4*   PROTTOTAL 7.6 7.8 7.8 8.8   ALBUMIN 3.0* 3.3* 3.5 4.1      PANC  No lab results found in last 7 days.      IMAGING:  CT abd/pelv without IV contrast 7/4  IMPRESSION:   1. Status post placement of a left lateral approach drainage catheter,  substantially decreased but persistent hemorrhagic walled off necrosis  centered in the lesser sac. A smaller collection along the inferior  aspect of the third portion of the duodenum is minimally changed.  2. Minimally changed moderate to large volume loculated ascites.  3. Hepatomegaly and hepatic steatosis with evidence of cirrhosis and  portal hypertension.  4. Diffuse edematous wall thickening of the small and large bowel,  likely reactive. No bowel obstruction.  5. Small right and trace left pleural effusions with adjacent  compressive atelectasis, similar to prior. Small pericardial effusion.  6. Faint patchy ground glass opacities in the right middle lobe and  and right upper lobe, likely infectious/inflammatory.

## 2021-07-09 NOTE — PROGRESS NOTES
Nephrology Progress Note  07/09/2021     Dax Villareal is a 30 year old male with h/o ETOH hepatitis, end stage liver disease, RADHA on HD, transferred from [facility] in [place] for septic shock on 6/28/21 for treatment of necrotizing pancreatitis and decompensated liver disease. He was initially admitted to [facility] 5/19/21.       Assessment & Recommendations:     1 RADHA - Remains dialysis dependent/anuric.   Last HD prior to hospital admission was 6/26/21. He has not had any OP HD at this time. Has been receiving inpatient HD at [facility] prior to transfer to [facility] on 6/28/21   - Etiology of RADHA felt to be JORGE L, abx, pancreatitis, sepsis, HRS   - Has Right TDC   - Will continue MWF schedule   - CRRT/HD consent obtained from sister Noni per phone   - TB is 20.2 which would also contribute to bilirubin toxicity   - See hospitalist's note regarding goals of care discussion with patient from today   - Care conference rescheduled for next week.    - Monitor for any recovery with daily chemistry labs/strict I/O and daily weights     2. Volume status - Overall his volume status is improved. No LE edema. Does have ascites. Not hypoxic and off supplemental oxygen. Sats > 96% on RA.    Weight will be unreliable given large ascites. SBP ~ 100/ Intake 377 ml/Output: UO none, NG ?, Drain 200 ml.    - Tolerated 1 kg UF today   - Strict I/O please     3. CV - Shock 2/2 Sepsis: Echo on 6/10/21 showed EF of > 70% w/WMA. SBP ~ 100/   - Continue Midodrine 10 mg prior to HD     4. ETOH hepatitis/ recurrent ascites, HE - Patient with heavy ETOH use. Last ETOH unknown. Currently on Lactulose/Rifax, thiamine, Folate. T bili 22.2   - Reviewed Hospitalist's note regarding goals of care from today   - Per GI     5. Electrolytes - No acute concerns. K 3.7, Na 129     - Started on KCL 20 meq bid by primary team 7/2. Will continue to monitor closely.    - Running on K 4     6.  Acid base - No acute concerns. Bicarb 20     7. BMD - Ca 8.5, Phos 3.9,  "albumin 3.0    8. Antimicrobials - Meropenem for sepsis. Off Vanco. BC NTD.   WBC 24.7, Afebrile    Recommendations were communicated to primary team via progress note    Seen and discussed with Dr. Leigha Toledo, NP   [phone]    Interval History :   Nursing and provider notes from last 24 hours reviewed.  Seen on HD  Tolerating w/o complication    Review of Systems:   I reviewed the following systems:  Feeling improved  Denies dyspnea, CP, edema  Trying to eat more, but was nauseated early in the run  Anuric    Physical Exam:   I/O last 3 completed shifts:  In: 267 [P.O.:237; I.V.:20; Other:10]  Out: 1200 [Drains:200; Other:1000]   /48 (BP Location: Right arm)   Pulse 119   Temp 98.6  F (37  C) (Oral)   Resp 18   Ht 1.676 m (5' 6\")   Wt 68.4 kg (150 lb 12.7 oz)   SpO2 91%   BMI 24.34 kg/m       GENERAL APPEARANCE: comfortable on HD  EYES:  scleral icterus, pupils equal  Pulmonary: lungs clear to auscultation. Off supplemental oxygen   CV: tachy   - Edema - no LE edema  GI: large distended, tender. Has retroperitoneal drain  MS: no evidence of inflammation in joints, no muscle tenderness  SKIN: no rash, warm, dry, no cyanosis  NEURO: alert/interactive  ACCESS: Right TDC     Labs:   All labs reviewed by me  Electrolytes/Renal -   Recent Labs   Lab Test 07/09/21  0626 07/08/21  0538 07/07/21  0623 07/05/21  0625 07/05/21  0625 07/04/21  0609 07/04/21  0609 06/30/21  0445 06/30/21  0445   * 131* 134   < > 148*  --  144   < > 138   POTASSIUM 3.7 3.9 3.3*   < > 3.1*   < > 2.8*   < > 3.1*   CHLORIDE 97 99 102   < > 116*  --  111*   < > 104   CO2 20 23 18*   < > 18*  --  21   < > 22   BUN 43* 27 53*   < > 52*  --  35*   < > 34*   CR 3.37* 2.60* 3.91*   < > 4.74*  --  3.43*   < > 3.02*   GLC 92 97 89   < > 123*  --  121*   < > 130*   JANENE 8.5 8.9 9.1   < > 9.0  --  8.6   < > 8.4*   MAG  --   --   --   --  2.3  --  2.1  --  2.4*   PHOS  --   --  3.9  --  4.9*  --   --   --  5.3*    < > = " values in this interval not displayed.       CBC -   Recent Labs   Lab Test 07/09/21  0626 07/08/21  0538 07/07/21  0623   WBC 24.7* 25.9* 24.6*   HGB 7.0* 7.4* 7.3*    181 208       LFTs -   Recent Labs   Lab Test 07/09/21  0626 07/08/21  0538 07/07/21  0623   ALKPHOS 181* 185* 153*   BILITOTAL 20.2* 22.2* 23.5*   ALT 24 29 24   * 132* 115*   PROTTOTAL 7.6 7.8 7.8   ALBUMIN 3.0* 3.3* 3.5       Iron Panel - No lab results found.      Imaging:      Current Medications:    folic acid  1 mg Oral Daily    Or     folic acid  1 mg Intravenous Daily     gelatin absorbable  1 each Topical During Hemodialysis (from stock)     lactulose  20 g Oral or NG Tube 4x Daily     melatonin  6 mg Oral At Bedtime     meropenem  500 mg Intravenous Q24H     pantoprazole  40 mg Oral QAM AC     potassium chloride  20 mEq Oral BID     rifaximin  550 mg Oral BID     sodium chloride (PF)  10 mL Irrigation Q8H     sodium chloride (PF)  3 mL Intracatheter Q8H     sodium chloride (PF)  9 mL Intracatheter During Hemodialysis (from stock)     sodium chloride (PF)  9 mL Intracatheter During Hemodialysis (from stock)     vitamin B1  100 mg Oral Daily    Or     thiamine  100 mg Intravenous Daily       - MEDICATION INSTRUCTIONS -       Tory Toledo, NP

## 2021-07-09 NOTE — PROGRESS NOTES
Resident/Fellow Attestation   I, Eris Miles, was present with the medical/NAHUM student who participated in the service and in the documentation of the note.  I have verified the history and personally performed the physical exam and medical decision making.  I agree with the assessment and plan of care as documented in the note.      My changes are within the note.     Eris Miles MD  PGY2  Date of Service (when I saw the patient): 07/09/21    [facility]    Progress Note - Maroon 3 Service        Date of Admission:  6/28/2021    Assessment & Plan      Dax Villareal is a 30 year old male with hx of alcohol use disorder admitted on 6/28/2021 after recent prolonged admission at [facility] for acute alcoholic hepatitis who has ESLD c/b HE, ARF requiring HD, and SBP in the setting of acute necrotizing pancreatitis. He arrived with acute respiratory failure and septic shock that have resolved, but he requires continued management for ARF, hepatic encephalopathy, SBP and nutritional support.      Changes today:   - Lactulose via NG tube, monitor stool output   - Family care conference cancelled today, may reschedule for early next week per patient's sister Noni  - Will continue with full cares until able to have care conference with patient and family  - NJ tube deferred, continue calorie counts  - Had discussion with patient regarding prognosis, code status and nutrition needs. See interval progress note regarding this conversation    #Necrotizing pancreatitis w/ pseudocyst, improving  #SBP  #Profound leukocytosis, improving  Etiology of the septic shock that he came in with likely due to necrotizing pancreatitis +/- SBP. Had percutaneous drain placed for drainage of pancreatic collection. Diagnostic para returned with SBP, likely 2/2 pancreatitis (SAAG 0.4) but clouded by underlying liver disease. Repeat para completed 7/5. Per GI, no plan for  intervention at this time after reviewing 7/4 CT. Plan for repeat CT on Sunday, 7/11.     - Continue meropenem 500 mg q24h  - s/p Albumin x3 days   - Daily CBC  - MAP goal > 65   - GI consulted, appreciate recs   - Tylenol 325mg, liquid BID PRN for lower back pain  - IV Dilaudid 0.5mg q4h PRN    - Plan for repeat CT Sunday 7/11  - GI believes that the patient's biggest need at this time from pancreatitis standpoint is adequate nutrition. They recommend NJT or PEG-J for enteral nutrition; however at this time the patient is refusing and would wish to continue PO.    # Severe malnutrition in setting of chronic illness  Pt refusing NJT and enteral feeding; calorie counts initiated 7/7, if not meeting calorie counts will need to further discuss NJ vs PEG-J for enteral nutrition; ideally would have goals of care conversation first. GI team will be involved to discuss nutrition. 7/9 patient appetite much better than previous two days, has been eating more but still not at goal calories.    - Regular Diet, thin liquids per SLP   - Continue calorie counts  - Continue multivitamins  - Pantoprazole 40 mg IV daily  - Zofran PRN     # Decompensated EtOH cirrhosis c/b hepatic encephalopathy and recurrent ascites  # Hx of Alcohol use disorder c/b withdrawal   # Anemia  # Coagulopathy   Patient with a history of marked alcohol use and alcoholic hepatitis in the past. Was given a course of steroids (1 week) during previous admission at OSH. Patient mental status has been stable, alert and oriented x4 for two days, well managed on lactulose regimen.      - Lactulose 20g QID and PRN and rifaximin 550mg BID (titrate to 1.5L stool/day)  - Trend MELD labs  - Daily folic acid and thiamine  - Delirium precautions  - Melatonin 6mg at bedtime  - Avoid benzos and opioids as able  - s/p 1 unit(s) pRBC 7/3  - Monitor hemoglobin  - Transfuse if hemoglobin < 7, platelet < 10 or < 30 with bleeding  - Continue to monitor for signs of  bleeding    MELD-Na score: 38 at 7/9/2021  6:26 AM  MELD score: 37 at 7/9/2021  6:26 AM  Calculated from:  Serum Creatinine: 3.37 mg/dL at 7/9/2021  6:26 AM  Serum Sodium: 129 mmol/L at 7/9/2021  6:26 AM  Total Bilirubin: 20.2 mg/dL at 7/9/2021  6:26 AM  INR(ratio): 1.90 at 7/9/2021  6:26 AM  Age: 30 years 8 months    # ARF on HD (MWF)  # Hypervolemia causing acute respiratory failure, improved    - Nephrology consulted, recs appreciated     > Hemodialysis MWF              - Midodrine 10 mg PRN before dialysis sessions     > Daily chemistry labs, strict I/O, and daily weights  - Aspiration precautions  - Supplemental oxygen as needed to keep sats above 89%    # Goals of Care  Family Care Conference was scheduled for 7/9/21, patient's sister overwhelmed thus was cancelled. Will plan to have a conference sometime early next week. Please see additional interval note for in depth details regarding conversation today regarding goals of care. Patient's long term prognosis is still quite poor given his multi organ failure.     - Palliative care consult, recs appreciated        Diet: Snacks/Supplements Adult: Ensure Clear; With Meals  Calorie Counts  Regular Diet Adult Thin Liquids (water, ice chips, juice, milk, gelatin, ice cream, etc)    DVT Prophylaxis: Pneumatic Compression Devices  Rdz Catheter: Not present  Fluids: 5 mL NS TKO  Central Lines: PRESENT  PICC Triple Lumen 06/28/21 Left-Site Assessment: WDL  CVC Double Lumen Right-Site Assessment: WDL  Code Status: Full Code      Disposition Plan   Expected discharge: > 7 days, recommended to transitional care unit once mental status at baseline, renal function and nutrition improved and necrotizing pancreatitis is well managed.     The patient's care was discussed with the Attending Physician, Dr. Flanagan.    Jaime Amin, MS3  Medical Student  [facility]  Securely message with the Vocera Web Console  (learn more here)  Text page via [facility] Paging/Directory  Please see sign in/sign out for up to date coverage information  ______________________________________________________________________    Interval History   Visited patient while in dialysis. Appears tired but patient feeling okay. Had a 10 minute episode of nausea this morning after taking meds, resolved w/o emesis. Has been eating more frequently than yesterday. Receiving Dilaudid 0.5mg q4h which is helping with back pain. Epigastric area only slightly tender.     Data reviewed today: I reviewed all medications, new labs and imaging results over the last 24 hours.    Physical Exam   Vital Signs: Temp: 98.8  F (37.1  C) Temp src: Oral BP: 107/60 Pulse: 114   Resp: 18 SpO2: 97 % O2 Device: None (Room air)    Weight: 150 lbs 12.71 oz    Gen: Lying in bed, awake and interactive, far more than previous days.  HEENT: Conjunctival icterus  CV: Tachycardic. Regular rhythm.   Resp: Breathing comfortably on RA  Abd: Soft, distended, epigastric area tender to deeper palpation  Skin: Jaundiced  Back: Left flank RP drain with maroon/brown OP, no tenderness around tube insertion site  Ext: Well perfused, no edema  Neuro: Alert and oriented x4. Able to communicate needs well.     Data   Recent Labs   Lab 07/09/21  0626 07/08/21  0538 07/07/21  0623 07/05/21  0625 07/05/21  0625 07/04/21  0609 07/04/21  0609   WBC 24.7* 25.9* 24.6*   < > 32.7*  --  33.8*   HGB 7.0* 7.4* 7.3*   < > 7.9*  --  7.8*   MCV 99 98 101*   < > 101*  --  100    181 208   < > 211  --  184   INR 1.90*  --   --   --  2.18*  --  2.11*   * 131* 134   < > 148*  --  144   POTASSIUM 3.7 3.9 3.3*   < > 3.1*   < > 2.8*   CHLORIDE 97 99 102   < > 116*  --  111*   CO2 20 23 18*   < > 18*  --  21   BUN 43* 27 53*   < > 52*  --  35*   CR 3.37* 2.60* 3.91*   < > 4.74*  --  3.43*   ANIONGAP 12 8 14   < > 14  --  11   JANENE 8.5 8.9 9.1   < > 9.0  --  8.6   GLC 92 97 89   < > 123*  --  121*   ALBUMIN 3.0*  3.3* 3.5   < > 3.4  --  3.3*   PROTTOTAL 7.6 7.8 7.8   < > 7.8  --  7.1   BILITOTAL 20.2* 22.2* 23.5*   < > 24.7*  --  24.1*   ALKPHOS 181* 185* 153*   < > 168*  --  169*   ALT 24 29 24   < > 21  --  22   * 132* 115*   < > 78*  --  84*    < > = values in this interval not displayed.     No results found for this or any previous visit (from the past 24 hour(s)).

## 2021-07-09 NOTE — PROGRESS NOTES
HEMODIALYSIS TREATMENT NOTE    Date: 7/9/2021  Time: 3:53 PM    Data:  Pre Wt: 68.4 kg (150 lb 12.7 oz)   Desired Wt: 67.4 kg   Ultrafiltration - Post Run Net Total Removed (mL): 1000 mL  Vascular Access Status: CVC  patent  Dialyzer Rinse: Streaked, Light  Total Blood Volume Processed: 80.74 L   Total Dialysis (Treatment) Time: 3.5   Dialysate Bath: K 4, Ca 3, Na bath :138, Bicarb: 32  Heparin: None    Lab:   No    Assessment:  Patent RIJ Tunneled CV Dual Lumens.  Pre run K/Ca: 3.47/ 8.5, BUN/Cr: 43/ 3.37. Alb: 3.0, Hgb/Hct: 7.0/ 21.2  HB S Ag and Ab:(-)/  329.32 at 6-     Interventions:  Patient dialyzed for 3 hrs 30 mins via RIJ Tunneled CVC HD lines. Reached BFR to 400 ml/mins. Gave midodrine 10 mg PL at 9: 40 am for soft BP. Also gave Dilaudid 0.5 mg IV for Abd. Pain at 9: 50 am. Then stable V/S and tolerable for 3.5 hrs run with 1 kg off without 25% albumin support. Finished HD with rinse back. CVC NS locked and applied clear guards caps.     Plan:    Next run per renal team.

## 2021-07-09 NOTE — PLAN OF CARE
"Blood pressure 103/48, pulse 119, temperature 98.6  F (37  C), temperature source Oral, resp. rate 18, height 1.676 m (5' 6\"), weight 68.4 kg (150 lb 12.7 oz), SpO2 91 %.  Neuro: A&Ox3. Makes needs known  Cardiac: Sinus tachycardia with HR 110s-120s. VSS.   Respiratory: Sating >92% on RA.  GI/: Anuric- hemodialysis dependent,  BM X3  Diet/appetite: Tolerating regular diet. Poor intake, refuses tube feedings.  Activity:  Assist of 2, up with Lift, PT/OT following  Pain: At acceptable level on current regimen.   Skin: No new deficits noted.  LDA's: PICC, dialysis CVC    Plan: Patient dialyzed today- tolerated well. Refused one dose of lactulose but stooling ok, and alert.Continue with POC. Notify primary team with changes.    "

## 2021-07-09 NOTE — PROGRESS NOTES
"Interval Progress Note     Patient is more alert and awake today compared previous days this week. He is alert and oriented x4. Per RN staff, he is appropriately calling for help and making his needs known. His speech is logical and linear.     Given the above improvement in his mental status, sat down with patient for extensive conversation regarding his hospital course, code status, nutritional status, and wishes going forward.     The patient was asked about his understanding about why he was in the hospital. He reports that he is in the hospital because \"my pancreas is infected, my liver and my kidneys are bad\". When asked why he was getting dialysis the patient reports \"because my kidneys are not working\". The patient was then given a medical update and what worries the medical team about where he stands from a clinical standpoint. Explained to the patient that he is essentially in kidney failure and that he will likely require dialysis for the indefinite future (perhaps forever). Further explained that he has decompensated EtOH cirrhosis for which essentially the only remedy is a liver transplant, for which he is not a candidate. It was explained to the patient that he is very sick and his overall prognosis is quite poor. Patient expressed understanding that he was very sick and was able to repeat back what was explained to him regarding his clinical status.     Given all the above the information, the patient was then asked what his wishes were for his care going forward. He iterated twice during the conversation that he \"is a fighter and wants to keep fighting\". The patient reports that \"he will take it day by day but wants everything done\". At this point, discussed the patient's code status and the risks involved with doing CPR given his underlying clinical status. The patient iterated that \"whatever it is, I will fight through it\". Code status was reaffirmed and patient will continue to be FULL code at " "this time.     Lastly, discussed the short term worries of the team especially regarding his nutritional status. Patient has been refusing tube feeds and NJT placement over the past one week. However, over the past day or so, per RN, the patient has been bit more lucid and tolerating his medications with less nausea. He has had a bigger appetite and reportedly ate some pizza yesterday evening and drank some supplemental shacks today around dialysis. He is currently asking for [employer] and would like to eat. Discussed with the patient that if he is unable to tolerate PO intake or continues to have nausea/vomiting and cannot keep his medications/food down that he would need an NJT placement. Explained that the NJT would be to help provide him with the nutrition and medications that he would need to \"keep on fighting\". Patient expressed understanding and reports that he will consider the TF if he is unable to keep food down but for now wants to avoid NJT/TF.     Patient was alert/awake and interactive throughout the length of the conversation. Patient offered up fist bumps (x2) at the end of the conversation, fist bumps were reciprocated.     Eris Miles MD  PGY-2, Internal Medicine            "

## 2021-07-09 NOTE — PROGRESS NOTES
PALLIATIVE CARE SOCIAL WORK Progress Note   [facility] ([place] [address]    REFERRAL SOURCE: Palliative Care Team    Joint phone call made with Palliative Care MD to Noni.   Noni was open to conversation and was able to express that she's feeling well supported by family and friends. She is overwhelmed and is trying hard to care for herself, her son, be the primary contact for her mom's care, and primary contact for Dax's care. She appreciates that the medical teams could cancel the care conference today as she wasn't able to make it there. Discussed that she can have another person (family or friend) present with her at the conference for her own support. She asked for exception to have additional 1-2 people present, but understands that current visitor policy. She felt that it would be beneficial to have a friend present with her for a care conference next week. She was open to phone call from Palliative Care Team early in the week to see how she's coping. She has good understanding of Dax's condition. She feels that she knows what he would want.     Plan: PCSW will continue to follow for family support while Palliative Care Team is following.    RONNA Sanford, [facility]  Palliative Care Clinical   Pager [phone]    [facility] Inpatient Team Consult Pager [phone] Mon-Fri 8-4:30  After hours Answering Service [phone]

## 2021-07-09 NOTE — PROGRESS NOTES
Calorie Count  Intake recorded for: 7/8  Total Kcals: 406 Total Protein: 20g  Kcals from Hospital Food: 406   Protein: 20g  Kcals from Outside Food (average):0 Protein: 0g  # Meals Ordered from Kitchen: 3  # Meals Recorded: 1 (First - 50% pepperoni and mushroom pizza, sugar free Jello, 1% milk)  # Supplements Recorded: no intake recorded

## 2021-07-10 LAB
ABO + RH BLD: NORMAL
ABO + RH BLD: NORMAL
ALBUMIN SERPL-MCNC: 3.1 G/DL (ref 3.4–5)
ALP SERPL-CCNC: 187 U/L (ref 40–150)
ALT SERPL W P-5'-P-CCNC: 24 U/L (ref 0–70)
ANION GAP SERPL CALCULATED.3IONS-SCNC: 10 MMOL/L (ref 3–14)
AST SERPL W P-5'-P-CCNC: 108 U/L (ref 0–45)
BACTERIA SPEC CULT: NO GROWTH
BILIRUB SERPL-MCNC: 17.7 MG/DL (ref 0.2–1.3)
BLD GP AB SCN SERPL QL: NORMAL
BLD PROD TYP BPU: NORMAL
BLD PROD TYP BPU: NORMAL
BLD UNIT ID BPU: 0
BLOOD BANK CMNT PATIENT-IMP: NORMAL
BLOOD PRODUCT CODE: NORMAL
BPU ID: NORMAL
BUN SERPL-MCNC: 24 MG/DL (ref 7–30)
CALCIUM SERPL-MCNC: 8.4 MG/DL (ref 8.5–10.1)
CHLORIDE SERPL-SCNC: 99 MMOL/L (ref 94–109)
CO2 SERPL-SCNC: 22 MMOL/L (ref 20–32)
CREAT SERPL-MCNC: 2.21 MG/DL (ref 0.66–1.25)
ERYTHROCYTE [DISTWIDTH] IN BLOOD BY AUTOMATED COUNT: 23.4 % (ref 10–15)
GFR SERPL CREATININE-BSD FRML MDRD: 38 ML/MIN/{1.73_M2}
GLUCOSE SERPL-MCNC: 105 MG/DL (ref 70–99)
HCT VFR BLD AUTO: 19.6 % (ref 40–53)
HGB BLD-MCNC: 6.4 G/DL (ref 13.3–17.7)
HGB BLD-MCNC: 7.2 G/DL (ref 13.3–17.7)
INR PPP: 1.86 (ref 0.86–1.14)
LACTATE BLD-SCNC: 1.4 MMOL/L (ref 0.7–2)
MCH RBC QN AUTO: 32.2 PG (ref 26.5–33)
MCHC RBC AUTO-ENTMCNC: 32.7 G/DL (ref 31.5–36.5)
MCV RBC AUTO: 99 FL (ref 78–100)
NUM BPU REQUESTED: 1
PLATELET # BLD AUTO: 170 10E9/L (ref 150–450)
POTASSIUM SERPL-SCNC: 3.9 MMOL/L (ref 3.4–5.3)
PROT SERPL-MCNC: 7.8 G/DL (ref 6.8–8.8)
RBC # BLD AUTO: 1.99 10E12/L (ref 4.4–5.9)
SODIUM SERPL-SCNC: 131 MMOL/L (ref 133–144)
SPECIMEN EXP DATE BLD: NORMAL
SPECIMEN SOURCE: NORMAL
TRANSFUSION STATUS PATIENT QL: NORMAL
TRANSFUSION STATUS PATIENT QL: NORMAL
WBC # BLD AUTO: 23.6 10E9/L (ref 4–11)

## 2021-07-10 PROCEDURE — 99233 SBSQ HOSP IP/OBS HIGH 50: CPT | Mod: GC | Performed by: INTERNAL MEDICINE

## 2021-07-10 PROCEDURE — 250N000013 HC RX MED GY IP 250 OP 250 PS 637

## 2021-07-10 PROCEDURE — P9016 RBC LEUKOCYTES REDUCED: HCPCS | Performed by: INTERNAL MEDICINE

## 2021-07-10 PROCEDURE — 86901 BLOOD TYPING SEROLOGIC RH(D): CPT | Performed by: INTERNAL MEDICINE

## 2021-07-10 PROCEDURE — 36592 COLLECT BLOOD FROM PICC: CPT | Performed by: STUDENT IN AN ORGANIZED HEALTH CARE EDUCATION/TRAINING PROGRAM

## 2021-07-10 PROCEDURE — 86900 BLOOD TYPING SEROLOGIC ABO: CPT | Performed by: INTERNAL MEDICINE

## 2021-07-10 PROCEDURE — 250N000011 HC RX IP 250 OP 636: Performed by: PHYSICIAN ASSISTANT

## 2021-07-10 PROCEDURE — 85610 PROTHROMBIN TIME: CPT | Performed by: STUDENT IN AN ORGANIZED HEALTH CARE EDUCATION/TRAINING PROGRAM

## 2021-07-10 PROCEDURE — 250N000013 HC RX MED GY IP 250 OP 250 PS 637: Performed by: STUDENT IN AN ORGANIZED HEALTH CARE EDUCATION/TRAINING PROGRAM

## 2021-07-10 PROCEDURE — 85027 COMPLETE CBC AUTOMATED: CPT | Performed by: STUDENT IN AN ORGANIZED HEALTH CARE EDUCATION/TRAINING PROGRAM

## 2021-07-10 PROCEDURE — P9047 ALBUMIN (HUMAN), 25%, 50ML: HCPCS | Performed by: PHYSICIAN ASSISTANT

## 2021-07-10 PROCEDURE — 85018 HEMOGLOBIN: CPT | Performed by: STUDENT IN AN ORGANIZED HEALTH CARE EDUCATION/TRAINING PROGRAM

## 2021-07-10 PROCEDURE — 86923 COMPATIBILITY TEST ELECTRIC: CPT | Performed by: INTERNAL MEDICINE

## 2021-07-10 PROCEDURE — 80053 COMPREHEN METABOLIC PANEL: CPT | Performed by: STUDENT IN AN ORGANIZED HEALTH CARE EDUCATION/TRAINING PROGRAM

## 2021-07-10 PROCEDURE — 99207 PR APP CREDIT; MD BILLING SHARED VISIT: CPT | Performed by: PHYSICIAN ASSISTANT

## 2021-07-10 PROCEDURE — 86850 RBC ANTIBODY SCREEN: CPT | Performed by: INTERNAL MEDICINE

## 2021-07-10 PROCEDURE — 250N000009 HC RX 250

## 2021-07-10 PROCEDURE — 120N000005 HC R&B MS OVERFLOW UMMC

## 2021-07-10 PROCEDURE — 250N000011 HC RX IP 250 OP 636: Performed by: INTERNAL MEDICINE

## 2021-07-10 PROCEDURE — 250N000011 HC RX IP 250 OP 636: Performed by: STUDENT IN AN ORGANIZED HEALTH CARE EDUCATION/TRAINING PROGRAM

## 2021-07-10 PROCEDURE — 83605 ASSAY OF LACTIC ACID: CPT | Performed by: PHYSICIAN ASSISTANT

## 2021-07-10 RX ORDER — ACETAMINOPHEN 325 MG/10.15ML
325 LIQUID ORAL EVERY 4 HOURS
Status: DISCONTINUED | OUTPATIENT
Start: 2021-07-10 | End: 2021-07-11

## 2021-07-10 RX ORDER — HYDROMORPHONE HYDROCHLORIDE 1 MG/ML
1-2 SOLUTION ORAL EVERY 4 HOURS PRN
Status: DISCONTINUED | OUTPATIENT
Start: 2021-07-10 | End: 2021-07-17

## 2021-07-10 RX ORDER — ALBUMIN (HUMAN) 12.5 G/50ML
25 SOLUTION INTRAVENOUS ONCE
Status: COMPLETED | OUTPATIENT
Start: 2021-07-10 | End: 2021-07-10

## 2021-07-10 RX ADMIN — ACETAMINOPHEN 325 MG: 325 SOLUTION ORAL at 20:27

## 2021-07-10 RX ADMIN — ACETAMINOPHEN 325 MG: 325 SOLUTION ORAL at 16:39

## 2021-07-10 RX ADMIN — HYDROMORPHONE HYDROCHLORIDE 2 MG: 1 SOLUTION ORAL at 12:53

## 2021-07-10 RX ADMIN — LACTULOSE 20 G: 10 SOLUTION ORAL; RECTAL at 08:38

## 2021-07-10 RX ADMIN — ACETAMINOPHEN 325 MG: 325 SOLUTION ORAL at 23:52

## 2021-07-10 RX ADMIN — FOLIC ACID 1 MG: 1 TABLET ORAL at 08:37

## 2021-07-10 RX ADMIN — HYDROMORPHONE HYDROCHLORIDE 1 MG: 1 SOLUTION ORAL at 22:42

## 2021-07-10 RX ADMIN — MEROPENEM 500 MG: 500 INJECTION, POWDER, FOR SOLUTION INTRAVENOUS at 20:27

## 2021-07-10 RX ADMIN — Medication 40 MG: at 08:44

## 2021-07-10 RX ADMIN — Medication 550 MG: at 20:31

## 2021-07-10 RX ADMIN — POTASSIUM CHLORIDE 20 MEQ: 1.5 POWDER, FOR SOLUTION ORAL at 20:27

## 2021-07-10 RX ADMIN — HYDROMORPHONE HYDROCHLORIDE 0.5 MG: 1 INJECTION, SOLUTION INTRAMUSCULAR; INTRAVENOUS; SUBCUTANEOUS at 08:38

## 2021-07-10 RX ADMIN — LACTULOSE 20 G: 10 SOLUTION ORAL; RECTAL at 20:27

## 2021-07-10 RX ADMIN — ALBUMIN HUMAN 25 G: 0.25 SOLUTION INTRAVENOUS at 00:49

## 2021-07-10 RX ADMIN — ACETAMINOPHEN 325 MG: 325 SOLUTION ORAL at 12:53

## 2021-07-10 RX ADMIN — MELATONIN TAB 3 MG 6 MG: 3 TAB at 22:42

## 2021-07-10 RX ADMIN — THIAMINE HCL TAB 100 MG 100 MG: 100 TAB at 08:38

## 2021-07-10 RX ADMIN — POTASSIUM CHLORIDE 20 MEQ: 1.5 POWDER, FOR SOLUTION ORAL at 08:38

## 2021-07-10 RX ADMIN — HYDROMORPHONE HYDROCHLORIDE 2 MG: 1 SOLUTION ORAL at 17:34

## 2021-07-10 RX ADMIN — HYDROMORPHONE HYDROCHLORIDE 0.5 MG: 1 INJECTION, SOLUTION INTRAMUSCULAR; INTRAVENOUS; SUBCUTANEOUS at 04:18

## 2021-07-10 RX ADMIN — Medication 550 MG: at 08:44

## 2021-07-10 ASSESSMENT — MIFFLIN-ST. JEOR: SCORE: 1587.75

## 2021-07-10 ASSESSMENT — ACTIVITIES OF DAILY LIVING (ADL)
ADLS_ACUITY_SCORE: 19
ADLS_ACUITY_SCORE: 22
ADLS_ACUITY_SCORE: 19
ADLS_ACUITY_SCORE: 19
ADLS_ACUITY_SCORE: 20
ADLS_ACUITY_SCORE: 20

## 2021-07-10 NOTE — PROGRESS NOTES
"Resident/Fellow Attestation   I, Eris Miles, was present with the medical/NAHUM student who participated in the service and in the documentation of the note.  I have verified the history and personally performed the physical exam and medical decision making.  I agree with the assessment and plan of care as documented in the note.      My changes are incorporated within the note    Eris Miles MD  PGY-2  Date of Service (when I saw the patient): 07/10/21    [facility]    Progress Note - Maroon 3 Service        Date of Admission:  6/28/2021    Assessment & Plan   Dax Villareal is a 30 year old male with hx of alcohol use disorder admitted on 6/28/2021 after recent prolonged admission at [facility] for acute alcoholic hepatitis who has ESLD c/b HE, ARF requiring HD, and SBP in the setting of acute necrotizing pancreatitis. He arrived with acute respiratory failure and septic shock that have resolved, but he requires continued management for ARF, hepatic encephalopathy, SBP and nutritional support.      Changes today:   - Switch IV Dilaudid to oral/liquid; schedule tylenol 325mg q4h  - Lactulose via NG tube, monitor stool output   - Care conference likely to be rescheduled for next Thursday  - Continue calorie counts  - Patient \"leaning towards\" PEG-J tube after discussion about consistently not meeting nutrition goals. Brother is encouraging patient in this decision.     #Necrotizing pancreatitis w/ pseudocyst, improving  #SBP  #Profound leukocytosis, improving  Etiology of the septic shock that he came in with likely due to necrotizing pancreatitis +/- SBP. Had percutaneous drain placed for drainage of pancreatic collection. Diagnostic para returned with SBP, likely 2/2 pancreatitis (SAAG 0.4) but clouded by underlying liver disease. Repeat para completed 7/5. Per GI, no plan for intervention at this time after reviewing 7/4 CT. Plan for repeat " "CT on Sunday, 7/11. 7/10 Patient has persistent back pain, says that pain meds wear off by the 3-4th hour. Discussed transitioning to oral meds for longer lasting pain meds and scheduling tylenol q6h. Patient prefers liquids over pills if possible.     - Continue meropenem 500 mg q24h  - Daily CBC  - MAP goal > 65   - GI consulted, appreciate recs   - For back pain: Tylenol 325mg q4h scheduled PO (liquid), Dilaudid 0.5mg q4h PRN PO (liquid)   - Plan for repeat CT Sunday 7/11  - GI believes that the patient's biggest need at this time from pancreatitis standpoint is adequate nutrition. They recommend NJT or PEG-J for enteral nutrition; patient now considering placement of PEG-J.    # Severe malnutrition in setting of chronic illness  Pt refusing NJT and enteral feeding; calorie counts initiated 7/7, if not meeting calorie counts will need to further discuss NJ vs PEG-J for enteral nutrition; ideally would have goals of care conversation first. GI team will be involved to discuss nutrition. 7/10 had discussion with patient about consistently not meeting calorie goals; explained that PEG-J tube was strongly recommended and discussed mechanics/benefits of PEG-J tube with patient. Patient states he is \"leaning towards\" a PEG-J. Conversation to be revisited after the weekend, GI paged to notify them of this discussion.     - Regular Diet, thin liquids per SLP   - Continue calorie counts  - Continue multivitamins  - Pantoprazole 40 mg IV daily  - Zofran PRN     # Decompensated EtOH cirrhosis c/b hepatic encephalopathy and recurrent ascites  # Hx of Alcohol use disorder c/b withdrawal   # Anemia  # Coagulopathy   Patient with a history of marked alcohol use and alcoholic hepatitis in the past. Was given a course of steroids (1 week) during previous admission at OSH. Patient mental status has been stable, alert and oriented x4 for three days, well managed on lactulose regimen.      - Lactulose 20g QID and PRN and rifaximin " 550mg BID (titrate to 1.5L stool/day)  - Trend MELD labs  - Daily folic acid and thiamine  - Delirium precautions  - Melatonin 6mg at bedtime  - Avoid benzos and opioids as able  - s/p 1 unit(s) pRBC 7/3  - Monitor hemoglobin  - Transfuse if hemoglobin < 7, platelet < 10 or < 30 with bleeding  - Continue to monitor for signs of bleeding     MELD-Na score: 34 at 7/10/2021  6:25 AM  MELD score: 32 at 7/10/2021  6:25 AM  Calculated from:  Serum Creatinine: 2.21 mg/dL at 7/10/2021  6:25 AM  Serum Sodium: 131 mmol/L at 7/10/2021  6:25 AM  Total Bilirubin: 17.7 mg/dL at 7/10/2021  6:25 AM  INR(ratio): 1.86 at 7/10/2021  6:25 AM  Age: 30 years 8 months     # ARF on HD (MWF)  # Hypervolemia causing acute respiratory failure, improved     - Nephrology consulted, recs appreciated     > Hemodialysis MWF              - Midodrine 10 mg PRN before dialysis sessions     > Daily chemistry labs, strict I/O, and daily weights  - Aspiration precautions  - Supplemental oxygen as needed to keep sats above 89%     # Goals of Care  Family Care Conference was scheduled for 7/9/21, patient's sister overwhelmed thus was cancelled. Will plan to have a conference next week, likely Thursday. Please see additional interval note for in depth details regarding conversation on 7/9 about goals of care. Patient's long term prognosis is still quite poor given his multi organ failure.      - Palliative care consult, recs appreciated         Diet: Snacks/Supplements Adult: Ensure Clear; With Meals  Regular Diet Adult Thin Liquids (water, ice chips, juice, milk, gelatin, ice cream, etc)  Calorie Counts    DVT Prophylaxis: Pneumatic Compression Devices  Rdz Catheter: Not present  Fluids: 5 mL NS TKO  Central Lines: PRESENT  PICC Triple Lumen 06/28/21 Left-Site Assessment: WDL  CVC Double Lumen Right-Site Assessment: WDL  Code Status: Full Code      Disposition Plan   Expected discharge: > 7 days, recommended to transitional care unit once mental status  at baseline, renal function and nutrition improved and necrotizing pancreatitis is well managed..     The patient's care was discussed with the Attending Physician, Dr. Flanagan.    Jaime Amin, MS3  Medical Student  [facility] 3 Service  [facility]  Securely message with the Vocera Web Console (learn more here)  Text page via [facility] Paging/Directory  Please see sign in/sign out for up to date coverage information  ______________________________________________________________________    Interval History   Overnight, patient c/o back pain, given IV Dilaudid 0.5mg q4h. Patient says that pain meds wear off by the 3-4th hour, discussed transitioning to liquid oral meds for longer lasting pain meds and scheduling tylenol q6h. Still has some nausea that prevents him from eating as much as he needs to. Brother at bedside today.    Data reviewed today: I reviewed all medications, new labs and imaging results over the last 24 hours.     Physical Exam   Vital Signs: Temp: 98.3  F (36.8  C) Temp src: Oral BP: 101/45 Pulse: 116   Resp: 16 SpO2: 96 % O2 Device: None (Room air) Oxygen Delivery: 2 LPM  Weight: 152 lbs 1.88 oz     Gen: Lying in bed, awake and interactive, similarly alert compared to yesterday. Brother at bedside, encouraging patient in conversation about nutrition  HEENT: Conjunctival icterus  CV: Tachycardic. Regular rhythm.   Resp: Breathing comfortably on RA  Abd: Soft, distended, epigastric area tender to deeper palpation  Skin: Jaundiced  Back: Left flank RP drain with maroon/brown OP, no tenderness around tube insertion site  Ext: Well perfused, no edema  Neuro: Alert and oriented x4. Able to communicate needs well.     Data   Recent Labs   Lab 07/10/21  0625 07/09/21  0626 07/08/21  0538 07/05/21  0625 07/05/21  0625   WBC 23.6* 24.7* 25.9*   < > 32.7*   HGB 6.4* 7.0* 7.4*   < > 7.9*   MCV 99 99 98   < > 101*    191 181   < > 211   INR 1.86* 1.90*  --   --   2.18*   * 129* 131*   < > 148*   POTASSIUM 3.9 3.7 3.9   < > 3.1*   CHLORIDE 99 97 99   < > 116*   CO2 22 20 23   < > 18*   BUN 24 43* 27   < > 52*   CR 2.21* 3.37* 2.60*   < > 4.74*   ANIONGAP 10 12 8   < > 14   JANENE 8.4* 8.5 8.9   < > 9.0   * 92 97   < > 123*   ALBUMIN 3.1* 3.0* 3.3*   < > 3.4   PROTTOTAL 7.8 7.6 7.8   < > 7.8   BILITOTAL 17.7* 20.2* 22.2*   < > 24.7*   ALKPHOS 187* 181* 185*   < > 168*   ALT 24 24 29   < > 21   * 114* 132*   < > 78*    < > = values in this interval not displayed.     No results found for this or any previous visit (from the past 24 hour(s)).

## 2021-07-10 NOTE — CODE/RAPID RESPONSE
Rapid Response Team Note    Assessment   In assessment a rapid response was called on Dax Villareal due to SIRS/Sepsis trigger. This presentation is likely due to intravascular volume depletion, impaired hepatic clearance and worsened by ESRD on HD  necrotizing pancreatitis and ESLD 2/2 EtOH cirrhosis.     Plan   -  25gm albumin 25%  -  Repeat LA in AM  -  Low threshold to escalate abx, panculture if s/o clinical deterioration  -  The Internal Medicine primary team was paged and currently awaiting a response.  -  Disposition: The patient will remain on the current unit. We will continue to monitor this patient closely.  -  Reassessment and plan follow-up will be performed by the rapid response team      LIV SPRINGER PA  [facility] RRT AMCOM Job Code Contact #[phone]    Hospital Course   Brief Summary of events leading to rapid response:   RRT called for LA 2.8, triggered by leukocytosis (24.7 down from 25.9) and stable tachycardia (119). BP's have been soft, 90/39 at last check is a little lower than he has been running; RN notes he received Dilaudid ~45min prior. He had HD earlier today, 1.5L removed. His abdomen is tender, states this is not new nor worse. Abd is distended, he does not think it seems to be worse than it has been, had diagnostic paracentesis , negative for SBP. He denies CP, dyspnea. He has been nauseated, that is maybe a little better at times. He has not been making urine; bladder scan positive for 478mL fluid, though unclear if this is picking up ascites or urine - patient refused straight cath at this time.    Admission Diagnosis:   Pancreatitis [K85.90]     Physical Exam   Temp: 98.4  F (36.9  C) Temp  Min: 97.8  F (36.6  C)  Max: 98.8  F (37.1  C)  Resp: 18 Resp  Min: 14  Max: 35  SpO2: 95 % SpO2  Min: 90 %  Max: 99 %  Pulse: 119 Pulse  Min: 108  Max: 124    No data recorded  BP: (!) 90/39 Systolic (24hrs), Av , Min:90 , Max:113   Diastolic (24hrs), Av, Min:39, Max:67      I/Os: I/O last 3 completed shifts:  In: 820 [P.O.:660; I.V.:10; Other:30; NG/GT:120]  Out: 2265 [Drains:265; Other:2000]     Exam:   General: chronically ill appearing, jaundiced  Mental Status: baseline mental status.  CV: tachycardic, regular. Normal S1/S2.  Resp: CTAB, no increased WOB  GI: Soft, distended, generalized TTP. Pancreatic drain draining appropriately.  MSK: Trace edmema BLE    Significant Results and Procedures   Lactic Acid:   Recent Labs   Lab Test 07/09/21  2215 07/08/21  2116 07/07/21  2106 07/06/21  2106 07/05/21  2033 06/28/21  1839 06/28/21  1839   LACT 2.8*  --   --   --  2.0  --  1.9   LACTS  --  1.1 0.9 1.5  --    < >  --     < > = values in this interval not displayed.     CBC:   Recent Labs   Lab Test 07/09/21  0626 07/08/21  0538 07/07/21  0623   WBC 24.7* 25.9* 24.6*   HGB 7.0* 7.4* 7.3*   HCT 21.2* 22.6* 23.2*    181 208        Sepsis Evaluation   The patient is known to have an infection.  NO EVIDENCE OF SEPSIS at this time.  Vital sign, physical exam, and lab findings are due to intravascular hypovolemia and hepatic insufficiency.

## 2021-07-10 NOTE — PLAN OF CARE
Patient received a unit of PRBCs for Hgb of 6.4- tolerated well, no transfusion reactions, will recheck hemoglobin this evening.

## 2021-07-10 NOTE — PROVIDER NOTIFICATION
-------------------CRITICAL LAB VALUE-------------------    Lab Value: Hgb 6.4  Time of notification: 6:46 AM  MD notified: Maroon 3 Team   Patient status:  Temp:  [97.8  F (36.6  C)-98.8  F (37.1  C)] 98.5  F (36.9  C)  Pulse:  [108-132] 119  Resp:  [14-35] 20  BP: ()/(36-67) 102/40  SpO2:  [90 %-99 %] 97 %  Orders received:   No interventions ordered at this time, will pass on lab value to oncoming RN. No obvious signs of bleeding at this time

## 2021-07-10 NOTE — PROVIDER NOTIFICATION
2053- Maroon 3 Team Notified Re:   -Pt RUE slightly weaker than LUE, no other neuro defects noted.   - Orders say to flush Flank drain w/ 40mL Q8 but MAR has 10mL Q8 Ordered.   -Per MD, continue to flush drain w/current MAR orders.     -No other interventions ordered at this time.

## 2021-07-10 NOTE — PROVIDER NOTIFICATION
-------------------CRITICAL LAB VALUE-------------------    Lab Value:Lactic 2.8  Time of notification: 0011  MD notified: Daniela Logan Team  Patient status:  Temp:  [97.8  F (36.6  C)-98.8  F (37.1  C)] 98.5  F (36.9  C)  Pulse:  [108-132] 119  Resp:  [14-35] 20  BP: ()/(36-67) 102/40  SpO2:  [90 %-99 %] 97 %  Orders received:   RRT called at 0010. Albumin ordered x1. Repeat Lactic ordered for 0400

## 2021-07-10 NOTE — PLAN OF CARE
"Blood pressure 99/54, pulse 117, temperature 97.8  F (36.6  C), temperature source Oral, resp. rate 20, height 1.676 m (5' 6\"), weight 69 kg (152 lb 1.9 oz), SpO2 96 %.  Neuro: A&Ox4.   Cardiac: ST with HR 110s-120s, slightly hypotensive- notify provider for MAP persistently less than 60.    Respiratory: Sating >92% on RA.  GI/: Anuric- hemodialysis dependent, multiple loose stools.   Diet/appetite: Tolerating regular diet. Poor intake  Activity:  Assist of 2 and Lift, up to chair.   Pain: At acceptable level on current regimen.   Skin: No new deficits noted.  LDA's: PICC, CVC right chest    Plan: Patient received a unit of PRBCs, tolerated well, recheck 7.2. Continue with POC. Notify primary team with changes.    "

## 2021-07-10 NOTE — PLAN OF CARE
"Neuro: A&Ox3. Able to make needs known.   Cardiac: ST -120s, been recent baseline, MD aware.   BP 90s-100s/30-40s. Albumin x1 for hypotension, with adequate response.   Respiratory: Sating >95% on RA. No cough noted.   GI/: NG clamped.Anuric, Bladder scan x1 for 427, MD aware. Pt refused straight cath, RN educates importance of draining urine, pt continues to refuse; pt says \"it is in the middle of the night\". BM x6 overnight. Evening lactulose was given.   Diet/appetite: Tolerating Regular diet, poor appetite. Ate few bites of subway, declined any other snacks offered.  Activity:  Lift Assist, pt in bed this shift.   Pain: C/O back/abd pain, PRN dilaudid x2  Skin: No new deficits noted.  LDA's: NG, HD Line, PICC     Plan: Continue with POC. Notify primary team with changes.    "

## 2021-07-10 NOTE — PROVIDER NOTIFICATION
07/10/21 0000   Call Information   Date of Call 07/10/21   Time of Call 0009   Name of person requesting the team Fanny   Title of person requesting team RN   RRT Arrival time 0012   Time RRT ended 0025   Reason for call   Type of RRT Adult   Primary reason for call Sepsis suspected   Sepsis Suspected Elevated Lactate level;Heart Rate > 100;WBC <4 or >12   Was patient transferred from the ED, ICU, or PACU within last 24 hours prior to RRT call? No   SBAR   Situation Lactic Acid = 2.8   Background a 30 year old male with PMHx significant for alcohol use disorder who has had a prolonged hospitalization at [facility] in [place] for acute alcohol hepatitis/end stage liver disease complicated by hepatic encephalopathy, acute renal failure requiring hemodialysis, acute hypoxemic respiratory failure and septic shock secondary to acute necrotizing pancreatitis. Transferred to [facility] ICU on 6/28/2021 for further management of necrotizing pancreatitis    Notable History/Conditions End-Stage disease;Neurological;Organ failure  (ETOH hepatitis, encephalopathy; Panc drain, ARF-HD;)   Assessment Alert, tachycardic, has a distended and tender abdomen, Jaundice.  WBC= 24.7;  Refused to be straight cathed for UA. Patient has been told that his pronosis is poor.  Full Code.                                             Interventions Labs;Meds  (Albumin;  Lactic Acid level in AM)   Patient Outcome   Patient Outcome Stabilized on unit   RRT Team   Attending/Primary/Covering Physician Cross Cover,   Physician(s) Deepali Rodrigez PA-C   Lead RN Kathrin Gibbs RN   RN Fanny Almeida RN   Post RRT Intervention Assessment   Post RRT Assessment Stable/Improved   Date Follow Up Done 07/10/21   Time Follow Up Done 0304   Comments Repeat Lactic Acid level pending

## 2021-07-11 ENCOUNTER — APPOINTMENT (OUTPATIENT)
Dept: CT IMAGING | Facility: CLINIC | Age: 31
End: 2021-07-11
Attending: INTERNAL MEDICINE
Payer: MEDICAID

## 2021-07-11 LAB
ALBUMIN SERPL-MCNC: 2.8 G/DL (ref 3.4–5)
ALP SERPL-CCNC: 177 U/L (ref 40–150)
ALT SERPL W P-5'-P-CCNC: 21 U/L (ref 0–70)
ANION GAP SERPL CALCULATED.3IONS-SCNC: 10 MMOL/L (ref 3–14)
AST SERPL W P-5'-P-CCNC: 79 U/L (ref 0–45)
BILIRUB SERPL-MCNC: 17.3 MG/DL (ref 0.2–1.3)
BUN SERPL-MCNC: 41 MG/DL (ref 7–30)
CALCIUM SERPL-MCNC: 8.3 MG/DL (ref 8.5–10.1)
CHLORIDE SERPL-SCNC: 96 MMOL/L (ref 94–109)
CO2 SERPL-SCNC: 20 MMOL/L (ref 20–32)
CREAT SERPL-MCNC: 3.2 MG/DL (ref 0.66–1.25)
ERYTHROCYTE [DISTWIDTH] IN BLOOD BY AUTOMATED COUNT: 22.1 % (ref 10–15)
GFR SERPL CREATININE-BSD FRML MDRD: 24 ML/MIN/{1.73_M2}
GLUCOSE SERPL-MCNC: 95 MG/DL (ref 70–99)
HCT VFR BLD AUTO: 22.7 % (ref 40–53)
HGB BLD-MCNC: 7.5 G/DL (ref 13.3–17.7)
INR PPP: 1.91 (ref 0.86–1.14)
LACTATE SERPL-SCNC: 1.2 MMOL/L (ref 0.7–2)
LACTATE SERPL-SCNC: 1.2 MMOL/L (ref 0.7–2)
MCH RBC QN AUTO: 32.1 PG (ref 26.5–33)
MCHC RBC AUTO-ENTMCNC: 33 G/DL (ref 31.5–36.5)
MCV RBC AUTO: 97 FL (ref 78–100)
PLATELET # BLD AUTO: 204 10E9/L (ref 150–450)
POTASSIUM SERPL-SCNC: 4.6 MMOL/L (ref 3.4–5.3)
PROT SERPL-MCNC: 7.5 G/DL (ref 6.8–8.8)
RBC # BLD AUTO: 2.34 10E12/L (ref 4.4–5.9)
SODIUM SERPL-SCNC: 126 MMOL/L (ref 133–144)
WBC # BLD AUTO: 25.8 10E9/L (ref 4–11)

## 2021-07-11 PROCEDURE — 85610 PROTHROMBIN TIME: CPT | Performed by: STUDENT IN AN ORGANIZED HEALTH CARE EDUCATION/TRAINING PROGRAM

## 2021-07-11 PROCEDURE — 250N000013 HC RX MED GY IP 250 OP 250 PS 637

## 2021-07-11 PROCEDURE — 74176 CT ABD & PELVIS W/O CONTRAST: CPT | Mod: 26 | Performed by: RADIOLOGY

## 2021-07-11 PROCEDURE — 250N000009 HC RX 250

## 2021-07-11 PROCEDURE — 250N000013 HC RX MED GY IP 250 OP 250 PS 637: Performed by: STUDENT IN AN ORGANIZED HEALTH CARE EDUCATION/TRAINING PROGRAM

## 2021-07-11 PROCEDURE — 99207 PR CDG-CHARGE REQUIRED MANUAL ENTRY: CPT | Performed by: INTERNAL MEDICINE

## 2021-07-11 PROCEDURE — 83605 ASSAY OF LACTIC ACID: CPT | Performed by: INTERNAL MEDICINE

## 2021-07-11 PROCEDURE — 120N000005 HC R&B MS OVERFLOW UMMC

## 2021-07-11 PROCEDURE — 99233 SBSQ HOSP IP/OBS HIGH 50: CPT | Mod: GC | Performed by: INTERNAL MEDICINE

## 2021-07-11 PROCEDURE — 36592 COLLECT BLOOD FROM PICC: CPT | Performed by: STUDENT IN AN ORGANIZED HEALTH CARE EDUCATION/TRAINING PROGRAM

## 2021-07-11 PROCEDURE — 250N000011 HC RX IP 250 OP 636: Performed by: STUDENT IN AN ORGANIZED HEALTH CARE EDUCATION/TRAINING PROGRAM

## 2021-07-11 PROCEDURE — 74176 CT ABD & PELVIS W/O CONTRAST: CPT

## 2021-07-11 PROCEDURE — 80053 COMPREHEN METABOLIC PANEL: CPT | Performed by: STUDENT IN AN ORGANIZED HEALTH CARE EDUCATION/TRAINING PROGRAM

## 2021-07-11 PROCEDURE — 250N000011 HC RX IP 250 OP 636: Performed by: NURSE PRACTITIONER

## 2021-07-11 PROCEDURE — 85027 COMPLETE CBC AUTOMATED: CPT | Performed by: STUDENT IN AN ORGANIZED HEALTH CARE EDUCATION/TRAINING PROGRAM

## 2021-07-11 RX ORDER — ACETAMINOPHEN 325 MG/10.15ML
325 LIQUID ORAL EVERY 4 HOURS PRN
Status: DISCONTINUED | OUTPATIENT
Start: 2021-07-11 | End: 2021-09-20

## 2021-07-11 RX ADMIN — HYDROMORPHONE HYDROCHLORIDE 2 MG: 1 SOLUTION ORAL at 15:17

## 2021-07-11 RX ADMIN — ACETAMINOPHEN 325 MG: 325 SOLUTION ORAL at 08:15

## 2021-07-11 RX ADMIN — Medication 550 MG: at 20:27

## 2021-07-11 RX ADMIN — POTASSIUM CHLORIDE 20 MEQ: 1.5 POWDER, FOR SOLUTION ORAL at 20:27

## 2021-07-11 RX ADMIN — HYDROMORPHONE HYDROCHLORIDE 1 MG: 1 SOLUTION ORAL at 07:09

## 2021-07-11 RX ADMIN — MEROPENEM 500 MG: 500 INJECTION, POWDER, FOR SOLUTION INTRAVENOUS at 20:26

## 2021-07-11 RX ADMIN — HYDROMORPHONE HYDROCHLORIDE 2 MG: 1 SOLUTION ORAL at 19:32

## 2021-07-11 RX ADMIN — THIAMINE HCL TAB 100 MG 100 MG: 100 TAB at 08:15

## 2021-07-11 RX ADMIN — MELATONIN TAB 3 MG 6 MG: 3 TAB at 23:27

## 2021-07-11 RX ADMIN — HYDROMORPHONE HYDROCHLORIDE 2 MG: 1 SOLUTION ORAL at 11:04

## 2021-07-11 RX ADMIN — Medication 40 MG: at 08:15

## 2021-07-11 RX ADMIN — HYDROMORPHONE HYDROCHLORIDE 1 MG: 1 SOLUTION ORAL at 02:48

## 2021-07-11 RX ADMIN — ACETAMINOPHEN 325 MG: 325 SOLUTION ORAL at 05:48

## 2021-07-11 RX ADMIN — ONDANSETRON 4 MG: 2 INJECTION INTRAMUSCULAR; INTRAVENOUS at 20:32

## 2021-07-11 RX ADMIN — LACTULOSE 20 G: 10 SOLUTION ORAL; RECTAL at 15:16

## 2021-07-11 RX ADMIN — CALCIUM CARBONATE (ANTACID) CHEW TAB 500 MG 500 MG: 500 CHEW TAB at 14:08

## 2021-07-11 RX ADMIN — FOLIC ACID 1 MG: 1 TABLET ORAL at 08:15

## 2021-07-11 RX ADMIN — Medication 550 MG: at 08:15

## 2021-07-11 RX ADMIN — HYDROMORPHONE HYDROCHLORIDE 2 MG: 1 SOLUTION ORAL at 23:45

## 2021-07-11 RX ADMIN — LACTULOSE 20 G: 10 SOLUTION ORAL; RECTAL at 08:14

## 2021-07-11 RX ADMIN — POTASSIUM CHLORIDE 20 MEQ: 1.5 POWDER, FOR SOLUTION ORAL at 08:15

## 2021-07-11 ASSESSMENT — ACTIVITIES OF DAILY LIVING (ADL)
ADLS_ACUITY_SCORE: 20
ADLS_ACUITY_SCORE: 22

## 2021-07-11 NOTE — PROGRESS NOTES
Patient triggered sepsis protocol, Lactic ordered- Lab unable to collect specimen due to technical issues, R.N unable to collect specimen as computer does not allow PCU collect. Lactic re-ordered several times. Will keep trying to order.

## 2021-07-11 NOTE — PROGRESS NOTES
"[facility]    Progress Note - Daniela 3 Service        Date of Admission:  6/28/2021    Assessment & Plan   Dax Villareal is a 30 year old male with hx of alcohol use disorder admitted on 6/28/2021 after recent prolonged admission at [facility] for acute alcoholic hepatitis who has ESLD c/b HE, ARF requiring HD, and SBP in the setting of acute necrotizing pancreatitis. He arrived with acute respiratory failure and septic shock that have resolved, but he requires continued management for ARF, hepatic encephalopathy, SBP and nutritional support.      Changes today:   - CT Abd/Pelvis w/o Contrast      #Necrotizing pancreatitis w/ pseudocyst, improving  #SBP  #Profound leukocytosis, improving  Etiology of the septic shock that he came in with likely due to necrotizing pancreatitis +/- SBP. Had percutaneous drain placed for drainage of pancreatic collection. Diagnostic para returned with SBP, likely 2/2 pancreatitis (SAAG 0.4) but clouded by underlying liver disease. Repeat para completed 7/5. Per GI, no plan for intervention at this time after reviewing 7/4 CT. Plan for repeat CT on Sunday, 7/11.      - Continue meropenem 500 mg q24h  - Daily CBC  - MAP goal > 65   - GI consulted, appreciate recs   - For back pain: Tylenol 325mg q4h scheduled PO (liquid), Dilaudid 0.5mg q4h PRN PO (liquid)   - Plan for repeat CT Sunday 7/11  - GI believes that the patient's biggest need at this time from pancreatitis standpoint is adequate nutrition. They recommend NJT or PEG-J for enteral nutrition; patient now considering placement of PEG-J.    # Severe malnutrition in setting of chronic illness  Have had discussion with patient about consistently not meeting calorie goals; explained that PEG-J tube was strongly recommended and discussed mechanics/benefits of PEG-J tube with patient. Patient states he is now \"leaning towards\" a PEG-J. Conversation to be revisited after the weekend, " GI paged to notify them of this discussion.     - Regular Diet, thin liquids per SLP   - Continue calorie counts  - Continue multivitamins  - Pantoprazole 40 mg IV daily  - Zofran PRN     # Decompensated EtOH cirrhosis c/b hepatic encephalopathy and recurrent ascites  # Hx of Alcohol use disorder c/b withdrawal   # Anemia  # Coagulopathy   Patient with a history of marked alcohol use and alcoholic hepatitis in the past. Was given a course of steroids (1 week) during previous admission at OSH. Patient mental status has been stable, alert and oriented x4 for three days, well managed on lactulose regimen.      - Lactulose 20g QID and PRN and rifaximin 550mg BID (titrate to 1.5L stool/day)  - Trend MELD labs  - Daily folic acid and thiamine  - Delirium precautions  - Melatonin 6mg at bedtime  - Avoid benzos and opioids as able  - s/p 1 unit(s) pRBC 7/3  - Monitor hemoglobin  - Transfuse if hemoglobin < 7, platelet < 10 or < 30 with bleeding  - Continue to monitor for signs of bleeding     MELD-Na score: 37 at 7/11/2021  6:07 AM  MELD score: 36 at 7/11/2021  6:07 AM  Calculated from:  Serum Creatinine: 3.20 mg/dL at 7/11/2021  6:07 AM  Serum Sodium: 126 mmol/L at 7/11/2021  6:07 AM  Total Bilirubin: 17.3 mg/dL at 7/11/2021  6:07 AM  INR(ratio): 1.91 at 7/11/2021  6:07 AM  Age: 30 years     # ARF on HD (MWF)  # Hypervolemia causing acute respiratory failure, improved     - Nephrology consulted, recs appreciated     > Hemodialysis MWF              - Midodrine 10 mg PRN before dialysis sessions     > Daily chemistry labs, strict I/O, and daily weights  - Aspiration precautions  - Supplemental oxygen as needed to keep sats above 89%     # Goals of Care  Family Care Conference was scheduled for 7/9/21, patient's sister overwhelmed thus was cancelled. Will plan to have a conference next week, likely Thursday. Please see additional interval note for in depth details regarding conversation on 7/9 about goals of care. Patient's  long term prognosis is still quite poor given his multi organ failure.      - Palliative care consult, recs appreciated         Diet: Snacks/Supplements Adult: Ensure Clear; With Meals  Regular Diet Adult Thin Liquids (water, ice chips, juice, milk, gelatin, ice cream, etc)  Calorie Counts    DVT Prophylaxis: Pneumatic Compression Devices  Rdz Catheter: Not present  Fluids: 5 mL NS TKO  Central Lines: PRESENT  PICC Triple Lumen 06/28/21 Left-Site Assessment: WDL  CVC Double Lumen Right-Site Assessment: WDL  Code Status: Full Code      Disposition Plan   Expected discharge: > 7 days, recommended to transitional care unit once mental status at baseline, renal function and nutrition improved and necrotizing pancreatitis is well managed..     The patient's care was discussed with the Attending Physician, Dr. Flanagan.    Eris Miles MD  PGY-2, Internal Medicine     [address]  Securely message with the Vocera Web Console (learn more here)  Text page via [employer] Paging/Directory  Please see sign in/sign out for up to date coverage information  ______________________________________________________________________    Interval History   Nursing notes reviewed. NAEO.     Patient reports that his abdominal pain feels improved. No current complaints of nausea. No signs of bleeding. Trying to eat more.     Data reviewed today: I reviewed all medications, new labs and imaging results over the last 24 hours.     Physical Exam   Vital Signs: Temp: 99.5  F (37.5  C) Temp src: Oral BP: 101/61 Pulse: 126   Resp: 18 SpO2: 100 % O2 Device: None (Room air)    Weight: 152 lbs 1.88 oz     Gen: Lying in bed, awake and interactive, similarly alert compared to yesterday. HEENT: Conjunctival icterus  CV: Tachycardic. Regular rhythm.   Resp: Breathing comfortably on RA  Abd: Soft, distended, epigastric area tender to deeper palpation  Skin: Jaundiced  Back: Left flank RP  drain with maroon/brown OP, no tenderness around tube insertion site  Ext: Well perfused, no edema  Neuro: Alert and oriented x4. Able to communicate needs well.     Data   Recent Labs   Lab 07/11/21  0607 07/10/21  1458 07/10/21  0625 07/09/21  0626   WBC 25.8*  --  23.6* 24.7*   HGB 7.5* 7.2* 6.4* 7.0*   MCV 97  --  99 99     --  170 191   INR 1.91*  --  1.86* 1.90*   *  --  131* 129*   POTASSIUM 4.6  --  3.9 3.7   CHLORIDE 96  --  99 97   CO2 20  --  22 20   BUN 41*  --  24 43*   CR 3.20*  --  2.21* 3.37*   ANIONGAP 10  --  10 12   JANENE 8.3*  --  8.4* 8.5   GLC 95  --  105* 92   ALBUMIN 2.8*  --  3.1* 3.0*   PROTTOTAL 7.5  --  7.8 7.6   BILITOTAL 17.3*  --  17.7* 20.2*   ALKPHOS 177*  --  187* 181*   ALT 21  --  24 24   AST 79*  --  108* 114*     No results found for this or any previous visit (from the past 24 hour(s)).

## 2021-07-11 NOTE — PROGRESS NOTES
1930 Report received from day rn.   2030 Patient received in bed resting. Vss. 0 distress noted. NGT to noted, clamped. PM meds administered. Educated patient when pain meds are due. Verbally understood. Patient left in bed resting call light in reach.   2100 Patient's sister called for an update. Sister asking about the visit between the patient and his mother. Made sister aware of have no information regarding their visit. She stated her mother left a 2000. Again, made her aware I no information as far as the visit. She stated she had not heard from the medical staff with updates on her brother. I reported to her, I received her brother with stable vital signs and 0 complaints and no signs of distress. She thanked for update and call ended.   0000 Patient sleeping. Vss. 0 distress noted. Call light in reach.  0300 Prn dilaudid administered. Patient left in bed resting. Call light in reach  0600 Patient refusing tylenol. Patient stated he may be allergic. Stated he feels itchy after receiving it. Message sent to team.   [id number] Report endorsed to day rn.

## 2021-07-11 NOTE — PROGRESS NOTES
Calorie Count  Intake recorded for: 7/10  Total Kcals: 0 Total Protein: 0g  Kcals from Hospital Food: 0   Protein: 0g  Kcals from Outside Food (average):0 Protein: 0g  # Meals Ordered from Kitchen: 2 meals  # Meals Recorded: No food intake recorded  # Supplements Recorded: No intake recorded    Note: Per Epic food record, pt consumed 25% at 4pm, but not enough information was given to calculate calories/protein.

## 2021-07-12 ENCOUNTER — ANCILLARY PROCEDURE (OUTPATIENT)
Dept: ULTRASOUND IMAGING | Facility: CLINIC | Age: 31
End: 2021-07-12
Payer: MEDICAID

## 2021-07-12 PROBLEM — E43 SEVERE MALNUTRITION (H): Status: ACTIVE | Noted: 2021-06-28

## 2021-07-12 LAB
ERYTHROCYTE [DISTWIDTH] IN BLOOD BY AUTOMATED COUNT: 20.7 % (ref 10–15)
HCT VFR BLD AUTO: 21.3 % (ref 40–53)
HGB BLD-MCNC: 7.4 G/DL (ref 13.3–17.7)
INR PPP: 1.83 (ref 0.85–1.15)
LACTATE SERPL-SCNC: 1.9 MMOL/L (ref 0.7–2)
MCH RBC QN AUTO: 33.3 PG (ref 26.5–33)
MCHC RBC AUTO-ENTMCNC: 34.7 G/DL (ref 31.5–36.5)
MCV RBC AUTO: 96 FL (ref 78–100)
PLATELET # BLD AUTO: 214 10E3/UL (ref 150–450)
RBC # BLD AUTO: 2.22 10E6/UL (ref 4.4–5.9)
SARS-COV-2 RNA RESP QL NAA+PROBE: NEGATIVE
WBC # BLD AUTO: 24.6 10E3/UL (ref 4–11)

## 2021-07-12 PROCEDURE — 36415 COLL VENOUS BLD VENIPUNCTURE: CPT | Performed by: STUDENT IN AN ORGANIZED HEALTH CARE EDUCATION/TRAINING PROGRAM

## 2021-07-12 PROCEDURE — 90937 HEMODIALYSIS REPEATED EVAL: CPT

## 2021-07-12 PROCEDURE — 250N000013 HC RX MED GY IP 250 OP 250 PS 637: Performed by: NURSE PRACTITIONER

## 2021-07-12 PROCEDURE — 250N000013 HC RX MED GY IP 250 OP 250 PS 637: Performed by: STUDENT IN AN ORGANIZED HEALTH CARE EDUCATION/TRAINING PROGRAM

## 2021-07-12 PROCEDURE — 250N000009 HC RX 250

## 2021-07-12 PROCEDURE — 120N000005 HC R&B MS OVERFLOW UMMC

## 2021-07-12 PROCEDURE — 250N000011 HC RX IP 250 OP 636: Performed by: NURSE PRACTITIONER

## 2021-07-12 PROCEDURE — 84100 ASSAY OF PHOSPHORUS: CPT | Performed by: INTERNAL MEDICINE

## 2021-07-12 PROCEDURE — 99233 SBSQ HOSP IP/OBS HIGH 50: CPT | Performed by: PHYSICIAN ASSISTANT

## 2021-07-12 PROCEDURE — 99233 SBSQ HOSP IP/OBS HIGH 50: CPT | Performed by: CLINICAL NURSE SPECIALIST

## 2021-07-12 PROCEDURE — 80053 COMPREHEN METABOLIC PANEL: CPT | Performed by: STUDENT IN AN ORGANIZED HEALTH CARE EDUCATION/TRAINING PROGRAM

## 2021-07-12 PROCEDURE — 250N000011 HC RX IP 250 OP 636: Performed by: STUDENT IN AN ORGANIZED HEALTH CARE EDUCATION/TRAINING PROGRAM

## 2021-07-12 PROCEDURE — 258N000003 HC RX IP 258 OP 636: Performed by: INTERNAL MEDICINE

## 2021-07-12 PROCEDURE — 99233 SBSQ HOSP IP/OBS HIGH 50: CPT | Performed by: INTERNAL MEDICINE

## 2021-07-12 PROCEDURE — 250N000013 HC RX MED GY IP 250 OP 250 PS 637

## 2021-07-12 PROCEDURE — 83605 ASSAY OF LACTIC ACID: CPT | Performed by: STUDENT IN AN ORGANIZED HEALTH CARE EDUCATION/TRAINING PROGRAM

## 2021-07-12 PROCEDURE — 36592 COLLECT BLOOD FROM PICC: CPT | Performed by: STUDENT IN AN ORGANIZED HEALTH CARE EDUCATION/TRAINING PROGRAM

## 2021-07-12 PROCEDURE — 85027 COMPLETE CBC AUTOMATED: CPT | Performed by: STUDENT IN AN ORGANIZED HEALTH CARE EDUCATION/TRAINING PROGRAM

## 2021-07-12 PROCEDURE — 85610 PROTHROMBIN TIME: CPT | Performed by: STUDENT IN AN ORGANIZED HEALTH CARE EDUCATION/TRAINING PROGRAM

## 2021-07-12 PROCEDURE — U0005 INFEC AGEN DETEC AMPLI PROBE: HCPCS | Performed by: PHYSICIAN ASSISTANT

## 2021-07-12 RX ADMIN — Medication 40 MG: at 08:03

## 2021-07-12 RX ADMIN — SODIUM CHLORIDE 300 ML: 9 INJECTION, SOLUTION INTRAVENOUS at 10:00

## 2021-07-12 RX ADMIN — LACTULOSE 20 G: 10 SOLUTION ORAL; RECTAL at 08:05

## 2021-07-12 RX ADMIN — THIAMINE HYDROCHLORIDE 100 MG: 100 INJECTION, SOLUTION INTRAMUSCULAR; INTRAVENOUS at 08:00

## 2021-07-12 RX ADMIN — HYDROMORPHONE HYDROCHLORIDE 2 MG: 1 SOLUTION ORAL at 21:27

## 2021-07-12 RX ADMIN — ONDANSETRON 4 MG: 2 INJECTION INTRAMUSCULAR; INTRAVENOUS at 21:27

## 2021-07-12 RX ADMIN — HYDROMORPHONE HYDROCHLORIDE 2 MG: 1 SOLUTION ORAL at 12:54

## 2021-07-12 RX ADMIN — HYDROMORPHONE HYDROCHLORIDE 2 MG: 1 SOLUTION ORAL at 03:45

## 2021-07-12 RX ADMIN — MIDODRINE HYDROCHLORIDE 10 MG: 5 TABLET ORAL at 08:00

## 2021-07-12 RX ADMIN — Medication 550 MG: at 20:00

## 2021-07-12 RX ADMIN — POTASSIUM CHLORIDE 20 MEQ: 1.5 POWDER, FOR SOLUTION ORAL at 21:28

## 2021-07-12 RX ADMIN — HYDROMORPHONE HYDROCHLORIDE 2 MG: 1 SOLUTION ORAL at 16:58

## 2021-07-12 RX ADMIN — ONDANSETRON 4 MG: 2 INJECTION INTRAMUSCULAR; INTRAVENOUS at 08:00

## 2021-07-12 RX ADMIN — MEROPENEM 500 MG: 500 INJECTION, POWDER, FOR SOLUTION INTRAVENOUS at 21:28

## 2021-07-12 RX ADMIN — POTASSIUM CHLORIDE 20 MEQ: 1.5 POWDER, FOR SOLUTION ORAL at 08:03

## 2021-07-12 RX ADMIN — LACTULOSE 20 G: 10 SOLUTION ORAL; RECTAL at 21:27

## 2021-07-12 RX ADMIN — FOLIC ACID 1 MG: 1 TABLET ORAL at 08:02

## 2021-07-12 RX ADMIN — SODIUM CHLORIDE 250 ML: 9 INJECTION, SOLUTION INTRAVENOUS at 10:00

## 2021-07-12 RX ADMIN — Medication 550 MG: at 08:03

## 2021-07-12 RX ADMIN — HYDROMORPHONE HYDROCHLORIDE 2 MG: 1 SOLUTION ORAL at 08:00

## 2021-07-12 RX ADMIN — ACETAMINOPHEN 325 MG: 325 SOLUTION ORAL at 08:01

## 2021-07-12 RX ADMIN — MELATONIN TAB 3 MG 6 MG: 3 TAB at 21:28

## 2021-07-12 RX ADMIN — Medication: at 10:01

## 2021-07-12 ASSESSMENT — ACTIVITIES OF DAILY LIVING (ADL)
ADLS_ACUITY_SCORE: 20
ADLS_ACUITY_SCORE: 22
ADLS_ACUITY_SCORE: 20

## 2021-07-12 ASSESSMENT — MIFFLIN-ST. JEOR
SCORE: 1597.75
SCORE: 1607.75
SCORE: 1607.75

## 2021-07-12 NOTE — PROGRESS NOTES
Calorie Count  Intake recorded for: 7/11  Total Kcals: 0 Total Protein: 0g  Kcals from Hospital Food: 0   Protein: 0g  Kcals from Outside Food (average):0 Protein: 0g  # Meals Recorded: 2 meals ordered from kitchen, no intake recorded.  # Supplements Recorded: no intake recorded.

## 2021-07-12 NOTE — PLAN OF CARE
"Blood pressure 98/55, pulse 115, temperature 98.3  F (36.8  C), temperature source Oral, resp. rate 18, height 1.676 m (5' 6\"), weight 69 kg (152 lb 1.9 oz), SpO2 98 %.  Neuro: A&Ox4.   Cardiac: ST with -120s, VSS.   Respiratory: Sating > 92% on RA.  GI/: Patient anuric- multiple loose stools, patient on Lactulose scheduled.   Diet/appetite: Patient with poor appetite, not eating much at all. Large emesis after am meds today.  Activity:  Assist of 1 to turn and reposition- refused out of bed.   Pain: At acceptable level on current regimen. Frequent request for pain meds  Skin: No new deficits noted.  LDA's: PICC, CVC for dialysis, NG tube, Left pancreatic drain.    Plan: Continue with POC. Notify primary team with changes.    "

## 2021-07-12 NOTE — PROGRESS NOTES
Physician Attestation   I, Belen Flanagan, was present with the medical/NAHUM student who participated in the service and in the documentation of the note.  I have verified the history and personally performed the physical exam and medical decision making.  I agree with the assessment and plan of care as documented in the note.      I personally reviewed vital signs, medications, labs and imaging.    No major changes today. Patient remains very sick with poor prognosis, but stable from day to day. Continues to essentially have 0 calories PO intake daily.  which I actually think would be preferable to an NJ at this point. Have attempted family GOC conf x2 but had to be canceled x2 per family. Will attempt to reschedule this week. In the interim, Dax has been very clear with his wishes that he is a fighter and would like to continue with everything. It is not entirely clear that he understands the more complex implications of his diagnosis or prognosis, but I do think that he is understanding enough to have capacity to make decisions about his nutrition, and he continues to state he is agreeable to Peg-J, so will plan to proceed with that for now. Patient's sister is aware. Tentatively planned for PEG-J with Dr. Will tomorrow. Panc/bili team asking for therapeutic para prior to procedure to minimize infection risk. Is loculated, but despite that CAPS team thinks that it is amenabel to drainage, will do in am 7/13 prior to procedure.     Continue Meropenem for now, but also need to consider adjustment of this regimen at some point, will discuss with GI this week, can also always consider ID consult for further direction given no clear plan or end point for antibiotics at this time.     Belen Flanagan MD  Date of Service (when I saw the patient): 07/12/21    [facility]    Progress Note - Maroon 3 Service        Date of Admission:  6/28/2021    Assessment & Plan    Dax Villareal is a 30 year old male with hx of alcohol use disorder admitted on 6/28/2021 after recent prolonged admission at [facility] in [place] for acute alcoholic hepatitis who has ESLD c/b HE, ARF requiring HD, and SBP in the setting of acute necrotizing pancreatitis. He arrived with acute respiratory failure and septic shock that have resolved, but he requires continued management for ARF, hepatic encephalopathy, SBP, nec panc and nutritional support.      Changes today:   - PEG-J placement scheduled for tomorrow, will receive therapeutic paracentesis prior to procedure  - Continue calorie counts, NPO at midnight  - Updated patient's sister Noni about PEG-J procedure, CT scan results     #Necrotizing pancreatitis w/ pseudocyst, improving  #SBP  #Profound leukocytosis, improving  Etiology of the septic shock that he came in with likely due to necrotizing pancreatitis +/- SBP. Had percutaneous drain placed for drainage of pancreatic collection. Diagnostic para returned with SBP, likely 2/2 pancreatitis (SAAG 0.4) but clouded by underlying liver disease. Repeat para completed 7/5. Per GI, no plan for intervention at this time after reviewing 7/4 CT. Repeat CT 7/11 showed significant decrease in size of walled off necrosis and unchanged moderate ascites. Will continue meropenem for a couple days after PEG-J placement, plan to switch to cipro-flagyl later this week.  - Continue meropenem 500 mg q24h  - Daily CBC  - MAP goal > 65   - GI consulted, appreciate recs   - For back pain: Tylenol PRN and Dilaudid 1-2mg q4h PRN  - GI believes that the patient's biggest need at this time from pancreatitis standpoint is adequate nutrition. They recommend NJT or PEG-J for enteral nutrition; patient agreed to placement of PEG-J.     # Severe malnutrition in setting of chronic illness  Had discussion with GI and patient about PEG-J procedure, patient wishes to proceed. PEG-J to be placed tomorrow; therapeutic paracentesis to be  performed prior to procedure.      - Regular Diet, thin liquids per SLP   - Continue calorie counts  - Continue multivitamins  - Pantoprazole 40 mg IV daily  - Zofran PRN     # Decompensated EtOH cirrhosis c/b hepatic encephalopathy and recurrent ascites  # Hx of Alcohol use disorder c/b withdrawal   # Anemia  # Coagulopathy   Patient with a history of marked alcohol use and alcoholic hepatitis in the past. Was given a course of steroids (1 week) during previous admission at OSH. Patient mental status has been stable, alert and oriented x4 for almost a week, well managed on lactulose regimen. Hgb stable after pRBC infusion 7/10.     - Lactulose 20g QID and PRN and rifaximin 550mg BID (titrate to 1.5L stool/day)  - Trend MELD labs  - Daily folic acid and thiamine  - Delirium precautions  - Melatonin 6mg at bedtime  - Avoid benzos and opioids as able  - s/p 1 unit(s) pRBC 7/3  - Monitor hemoglobin  - Transfuse if hemoglobin < 7, platelet < 10 or < 30 with bleeding  - Continue to monitor for signs of bleeding     MELD-Na score: 37 at 7/11/2021  6:07 AM  MELD score: 36 at 7/11/2021  6:07 AM  Calculated from:  Serum Creatinine: 3.20 mg/dL at 7/11/2021  6:07 AM  Serum Sodium: 126 mmol/L at 7/11/2021  6:07 AM  Total Bilirubin: 17.3 mg/dL at 7/11/2021  6:07 AM  INR(ratio): 1.91 at 7/11/2021  6:07 AM  Age: 30 years     # ARF on HD (MWF)  # Hypervolemia causing acute respiratory failure, improved     - Nephrology consulted, recs appreciated     > Hemodialysis MWF              - Midodrine 10 mg PRN before dialysis sessions     > Daily chemistry labs, strict I/O, and daily weights  - Aspiration precautions  - Supplemental oxygen as needed to keep sats above 89%     # Goals of Care  Family Care Conference was scheduled for 7/9/21, patient's sister overwhelmed thus was cancelled. Will plan to have a conference next week, likely Thursday. Please see additional interval note for in depth details regarding conversation on 7/9 about  goals of care. Patient's long term prognosis is still quite poor given his multi organ failure.      - Palliative care consult, recs appreciated      Diet: Snacks/Supplements Adult: Ensure Clear; With Meals  Regular Diet Adult Thin Liquids (water, ice chips, juice, milk, gelatin, ice cream, etc)  Calorie Counts    DVT Prophylaxis: Pneumatic Compression Devices  Rdz Catheter: Not present  Fluids: 5 mL NS TKO  Central Lines: PRESENT  PICC Triple Lumen 06/28/21 Left-Site Assessment: WDL  CVC Double Lumen Right-Site Assessment: WDL  Code Status: Full Code       Diet: Snacks/Supplements Adult: Ensure Clear; With Meals  Regular Diet Adult Thin Liquids (water, ice chips, juice, milk, gelatin, ice cream, etc)  Calorie Counts    DVT Prophylaxis: Pneumatic Compression Devices  Rdz Catheter: Not present  Fluids: NS TKO  Central Lines: PRESENT  PICC Triple Lumen 06/28/21 Left-Site Assessment: WDL  CVC Double Lumen Right-Site Assessment: WDL  Code Status: Full Code      Disposition Plan   Expected discharge: >7 days, recommended to transitional care unit once mental status at baseline, renal function and nutrition improved and necrotizing pancreatitis is well managed..     The patient's care was discussed with the Attending Physician, Dr. Flanagan.    Jaime Amin, MS3  Medical Student  [address]  Securely message with the Vocera Web Console (learn more here)  Text page via [employer] Paging/Directory  Please see sign in/sign out for up to date coverage information  ______________________________________________________________________    Interval History   Overnight, patient reports adequate pain control with Dilaudid for back, though the relief wanes around the three hour louis. Skin on lower back is itchy. Poor appetite, asking for morning meds to be put through tube instead of swallowing them. Persistent nausea, no vomiting.     Data reviewed today: I reviewed all  medications, new labs and imaging results over the last 24 hours.    Physical Exam   Vital Signs: Temp: 98.5  F (36.9  C) Temp src: Oral BP: 110/67 Pulse: 124   Resp: 16 SpO2: 95 % O2 Device: None (Room air)    Weight: 156 lbs 8.43 oz    Gen: Lying down in bed, awake and interactive, similarly alert compared to yesterday. HEENT: Conjunctival icterus  CV: Tachycardic. Regular rhythm.   Resp: Breathing comfortably on RA  Abd: Soft, distended, epigastric area tender to deeper palpation  Skin: Jaundiced. Itchy, dry skin on lower back.  Back: Left flank RP drain with maroon/brown OP, no tenderness around tube insertion site  Ext: Well perfused, no edema  Neuro: Alert and oriented x4. Able to communicate needs well.     Data   Recent Labs   Lab 07/11/21  0607 07/10/21  1458 07/10/21  0625 07/09/21  0626   WBC 25.8*  --  23.6* 24.7*   HGB 7.5* 7.2* 6.4* 7.0*   MCV 97  --  99 99     --  170 191   INR 1.91*  --  1.86* 1.90*   *  --  131* 129*   POTASSIUM 4.6  --  3.9 3.7   CHLORIDE 96  --  99 97   CO2 20  --  22 20   BUN 41*  --  24 43*   CR 3.20*  --  2.21* 3.37*   ANIONGAP 10  --  10 12   JANENE 8.3*  --  8.4* 8.5   GLC 95  --  105* 92   ALBUMIN 2.8*  --  3.1* 3.0*   PROTTOTAL 7.5  --  7.8 7.6   BILITOTAL 17.3*  --  17.7* 20.2*   ALKPHOS 177*  --  187* 181*   ALT 21  --  24 24   AST 79*  --  108* 114*     No results found for this or any previous visit (from the past 24 hour(s)).

## 2021-07-12 NOTE — PROGRESS NOTES
Nephrology Progress Note  07/12/2021       Dax Villareal is a 30 year old male with h/o ETOH hepatitis, end stage liver disease, RADHA on HD, transferred from [facility] in [place] for septic shock on 6/28/21 for treatment of necrotizing pancreatitis and decompensated liver disease. He was initially admitted to [facility] 5/19/21.       Interval History :   Mr Villareal was seen on HD this am, in no distress and tolerating 3L of UF so far.  Planned PEG tube tomorrow after therapeutic para as he has severe malnutrition.  Continuing RRT, next planned run 7/14.    Assessment & Recommendations:   RADHA-HD dependent, started RRT on 6/28/21 at [facility] prior to transfer to [facility].  Etiology likely multifactorial with contrast, sepsis/pancreatitis, likely HRS physiology contributing as well.  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  Continuing RRT as long as it is tolerated with regards to BP's, is functional enough to possibly do outpt HD.  MELD is in high 30's so has high 3 month mortality but continuing restorative cares for now.    -Line is TDC from PTA   -HD today, goal 3L of UF, next planned run 7/14, planning to continue RRT although overall mortality is high.      Volume status-Anuric, no major edema on exam, wt is down nearly 10kg since admission.  Goal 3L UF with run today if BP's remain stable.      Electrolytes/pH-K 4.6, bicarb 20, no acute issues.     Ca/phos/pth-Ca 8.3, Mg and Phos last checked 7/7, ordered for tomorrow.      Anemia-Hgb 7.5, last PRBC's 7/10.      Nutrition-Taking PO, appetite ok.      Discussed with Dr Reid    Recommendations were communicated to primary team via verbal communication.     SOFIA Marlow CNS  Clinical Nurse Specialist  [phone]    Review of Systems:   I reviewed the following systems:  Gen: No fevers or chills  CV: No CP at rest  Resp: No SOB at rest  GI: No N/V    Physical Exam:   I/O last 3 completed shifts:  In: 400 [P.O.:300; Other:10; NG/GT:90]  Out: 85  "[Drains:85]   /54   Pulse 112   Temp 98.5  F (36.9  C) (Oral)   Resp 10   Ht 1.676 m (5' 6\")   Wt 71 kg (156 lb 8.4 oz)   SpO2 93%   BMI 25.26 kg/m       GENERAL APPEARANCE: Seen on HD, in no distress.    EYES:  scleral icterus, pupils equal  Pulmonary: lungs clear to auscultation. Off supplemental oxygen   CV: tachy   - Edema - no LE edema  GI: large distended, tender. Has retroperitoneal drain  MS: no evidence of inflammation in joints, no muscle tenderness  SKIN: no rash, warm, dry, no cyanosis  NEURO: alert/interactive  ACCESS: Right TDC     Labs:   All labs reviewed by me  Electrolytes/Renal - Recent Labs   Lab Test 07/11/21  0607 07/10/21  0625 07/09/21  0626 07/07/21  0623 07/05/21  0625 07/04/21  0609 07/01/21  0014 06/30/21  0445   * 131* 129* 134 148* 144   < > 138   POTASSIUM 4.6 3.9 3.7 3.3* 3.1* 2.8*  --  3.1*   CHLORIDE 96 99 97 102 116* 111*   < > 104   CO2 20 22 20 18* 18* 21   < > 22   BUN 41* 24 43* 53* 52* 35*   < > 34*   CR 3.20* 2.21* 3.37* 3.91* 4.74* 3.43*   < > 3.02*   GLC 95 105* 92 89 123* 121*   < > 130*   JANENE 8.3* 8.4* 8.5 9.1 9.0 8.6   < > 8.4*   MAG  --   --   --   --  2.3 2.1  --  2.4*   PHOS  --   --   --  3.9 4.9*  --   --  5.3*    < > = values in this interval not displayed.       CBC -   Recent Labs   Lab Test 07/11/21  0607 07/10/21  1458 07/10/21  0625 07/09/21  0626   WBC 25.8*  --  23.6* 24.7*   HGB 7.5* 7.2* 6.4* 7.0*     --  170 191       LFTs -   Recent Labs   Lab Test 07/11/21  0607 07/10/21  0625 07/09/21  0626   ALKPHOS 177* 187* 181*   BILITOTAL 17.3* 17.7* 20.2*   ALT 21 24 24   AST 79* 108* 114*   PROTTOTAL 7.5 7.8 7.6   ALBUMIN 2.8* 3.1* 3.0*       Iron Panel - No lab results found.        Current Medications:    sodium chloride (PF) 0.9%  3 mL Intracatheter During Dialysis/CRRT (from stock)     sodium chloride (PF) 0.9%  3 mL Intracatheter During Dialysis/CRRT (from stock)     folic acid  1 mg Oral Daily    Or     folic acid  1 mg " Intravenous Daily     lactulose  20 g Oral or NG Tube 4x Daily     melatonin  6 mg Oral At Bedtime     meropenem  500 mg Intravenous Q24H     pantoprazole  40 mg Oral QAM AC     potassium chloride  20 mEq Oral BID     rifaximin  550 mg Oral BID     sodium chloride (PF)  10 mL Irrigation Q8H     sodium chloride (PF)  3 mL Intracatheter Q8H     sodium chloride (PF)  9 mL Intracatheter During Dialysis/CRRT (from stock)     sodium chloride (PF)  9 mL Intracatheter During Dialysis/CRRT (from stock)     vitamin B1  100 mg Oral Daily    Or     thiamine  100 mg Intravenous Daily       - MEDICATION INSTRUCTIONS -

## 2021-07-12 NOTE — PROGRESS NOTES
CLINICAL NUTRITION SERVICES - REASSESSMENT NOTE     Nutrition Prescription    RECOMMENDATIONS FOR MDs/PROVIDERS TO ORDER:  Encourage oral intake  Please page RD with consult: RD to assess and order TF if enteral access is placed     Malnutrition Status:    Severe malnutrition in the context of acute on chronic illness     Recommendations already ordered by Registered Dietitian (RD):  Continue ensure clear with meals     Future/Additional Recommendations:  Continue to monitor oral intake   Enteral Nutrition recommendations:   Semi-elemental formula: Vital 1.5 Leno @ goal of  65ml/hr (1560ml/day)  will provide: 2340 kcals (33 kcal/kg), 105 g PRO (~1.5), 1191 ml free H20, 291 g CHO, and 9 g fiber daily, 89 g fat, 88 mEq K+, 1951 mg Po4      Renal Formula pending function: Nova[employer] Renal @ 50 mL/hr (1200 mL/day) to provide 2400 kcals (34 kcal/kg/day), 109 g PRO (~1.5 g/kg/day), 864 mL H2O, 220 CHO and 0 gm Fiber daily, 120 g fat, 29 mEq K+, 982 mg Po4.  Recommend obtaining fecal elastase to assess for pancreatic exocrine function and need for enzymes with tube feeding      EVALUATION OF THE PROGRESS TOWARD GOALS   Diet: Regular + Ensure clear with meals   Calorie count:   7/7         Total Kcals: 106       Total Protein: 4g  7/8         Total Kcals: 406       Total Protein: 20g- 1 meal documented, 3 ordered  7/9         Total Kcals: 344       Total Protein: 14g  7/10       Total Kcals: 0           Total Protein: 0g- 0 meals documented, 2 ordered  7/11       Total Kcals: 0           Total Protein: 0g  Average intake: 171 kcal and 7.6 g protein      NEW FINDINGS   Patient at dialysis- not able to see. Per notes, possible PEG-J placement tomorrow.   CT (7/11) 1.. Sequela of necrotizing pancreatitis with highly decreased size of  walled off necrosis centered in the lesser sac. Percutaneous drain  remains in place, 2. Loculated moderate ascites is unchanged, 3. Hepatomegaly, hepatic steatosis, and evidence of  portal  hypertension again seen, 4. Small bilateral pleural effusions are slightly improved.    Weight Trends:  07/12/21 0341 71 kg (156 lb 8.4 oz)   07/10/21 0256 69 kg (152 lb 1.9 oz)   07/09/21 0400 68.4 kg (150 lb 12.7 oz)   07/08/21 0400 67.5 kg (148 lb 13 oz)   07/07/21 0158 68.5 kg (151 lb 0.2 oz)   07/06/21 0252 69.5 kg (153 lb 3.5 oz)   07/05/21 1815 74.4 kg (164 lb)   Admission weight 80 kg (6/28/21)   Will maintain dosing weight of 69.5 kg (lowest weight)    GI: Distended abdomen, NG remains in place per LDA. Emesis yesterday per I/O. Last bowel movement, 7/12,  1-10 stool occurrences documented in past few days.   Renal: 1000 mL removed at dialysis.   Skin: No new deficits   Labs: Na 126 (L), Creatinine 3.2 (H), Glucose 95 (WNL)  Medications: Folic acid, Lactulose, Protonix, Potassium chloride, Thiamine,     MALNUTRITION  % Intake: </= 50% for >/= 5 days (severe)  % Weight Loss: > 5% in 1 month (severe), some loss likely fluid related   Subcutaneous Fat Loss: Facial region:  mild  Muscle Loss: Scapular bone: moderate, Thoracic region (clavicle, acromium bone, deltoid, trapezius, pectoral): moderate, Upper arm (bicep, tricep): moderate, Dorsal hand: mild, Upper leg (quadricep, hamstring): moderate and Posterior calf: moderate, per assessment 6/30, unable to reassess on visit due to dialysis   Fluid Accumulation/Edema: None noted  Malnutrition Diagnosis: Severe malnutrition in the context of acute on chronic illness    Previous Goals   Patient to consume 50-75% of nutritionally adequate meals TID vs initiation of nutrition support within 2-3 days   Evaluation: Not met    Previous Nutrition Diagnosis  Inadequate protein/energy intake related to increased metabolic demand, AMS, and altered GI function as evidenced by energy needs 30-35 kcal/kg and 1.5-2 g/kg protein and 13% weight loss in one week (partially fluid related); confusion and lethargy inhibiting PO (0-25% intakes) and placement of NG tube to  intermittent suction (1050 ml out yesterday).   Evaluation: Modified     CURRENT NUTRITION DIAGNOSIS  Inadequate protein-energy intake related to inability to consume adequate PO intake as evidenced by increased metabolic demand and calorie count with average intake  171 kcal and 7.6 g protein over 5 days     INTERVENTIONS  Implementation  Collaboration with other providers- 6B rounds     Goals  Patient to consume % of nutritionally adequate meal trays TID, or the equivalent with supplements/snacks.  Total avg nutritional intake to meet a minimum of 25 kcal/kg and 1.3 g PRO/kg daily (per dosing wt 69.5 kg).    Monitoring/Evaluation  Progress toward goals will be monitored and evaluated per protocol.    Jennifer Castillo RD, LD  6B pager: [phone]

## 2021-07-12 NOTE — PROGRESS NOTES
Pt alert and oriented x4. ;t reported pain and required prescribed dilaudid for pain control. Pt's pain appears well controlled between doses of pain medication. Pt was not willing to  Do activates such as wash-up, change linen, or assist with repositioning. Pt refused to take medications orally this morning. Insisted they be administered by NG.   Pt went to HD day and had 1kg removed. Pt was having pain around 1230 and floor nurse took dilaudid to the pt on the HD unit.   Pt returned to the unit and did not appear interested in his lunch and reused to work with PT. Pt' sister at pt's bedside for the HD.

## 2021-07-12 NOTE — PROGRESS NOTES
HEMODIALYSIS TREATMENT NOTE    Date: 7/12/2021  Time: 1:51 PM    Data:  Pre Wt: 71 kg (156 lb 8.4 oz)   Desired Wt: 68 kg   Post Wt: 70 kg (154 lb 5.2 oz)  Weight change: 1 kg  Ultrafiltration - Post Run Net Total Removed (mL): 1000 mL  Vascular Access Status: patent  Dialyzer Rinse: Streaked, Light  Total Blood Volume Processed: 78.37 L Liters  Total Dialysis (Treatment) Time: 3.5 Hours    Lab:        Interventions:  3.5 hours fo HD with 1000 mls pulled with no complications     Assessment:  RADHA patient in for his regular HD run. A+O VSS     Plan:    Per renal

## 2021-07-12 NOTE — PLAN OF CARE
Neuro: A&Ox3. Able to make needs known. Generalized weakness. Denies N/T  Cardiac: ST -120s, BP 90-100s/40-50s, with MAP > 60  Respiratory: Sating > 95% on RA.  GI/: Anuric, on HD M/W/F schedule. BM x2 this shift. Held evening lactulose per MD due to many BM's over day shift per previous RN.   Diet/appetite: Regular, poor appetite. Refused dinner, snacks offered, declined.   Activity:  Lift Assist   Pain: 1-2mg of Dilaudid Q4, 2mg given x2 with adequate relief.  Skin: No new deficits noted.  LDA's: PICC, HD Line    Plan: Continue with POC. Notify primary team with changes.

## 2021-07-12 NOTE — PROGRESS NOTES
GASTROENTEROLOGY PROGRESS NOTE    Date of Admission: 6/28/2021  Reason for Admission: pancreatitis/hepatitis      ASSESSMENT/RECOMMENDATIONS:  30 year old male with a history of heavy alcohol use who presented to OSH 5/19 for abdominal pain, nausea and vomiting, admitted for alcohol hepatitis, ESLD, RADHA on HD, septic shock requiring pressors as well as necrotizing pancreatitis with large evolving necrotic collection. Transferred to [facility] for consideration of drainage of presumed infected necrotic collection     #. Acute necrotizing pancreatitis with presumed infected walled off necrotic collection  #. Septic shock  #. Leukocytosis  #. Peritonitis  Unclear evolution of pancreatitis history but had a recent CT scan with large evolving necrotic collection, presumed infected. No obvious gas in collection. CT on admission to [facility] showing mature collection with enhancing wall measuring ~[id number]. With profound coagulopathy (liver disease + malnutrition +sepsis), INR 3.8 on transfer he was given FFP and Vit K and went for IR perc drain 6/29. He may eventually warrant endoscopic drainage at some point however repeat imaging (7/4) shows that collection is largely decompressed and no endoscopic window at this point.  Etiology: alcohol  Date of onset: 5/19  BISAP score on admission: unclear  Concurrent organ failure: respiratory (now extubated), renal (on HD), liver, cardiogenic (weaned off pressors 6/29)  Thromboses: none  Diabetes: no  Current nutrition access: NJT placed 6/30 but patient removed it shortly after (has repeatedly refused replacement)  Last imaging: CT scan without IV contrast 7/11  Infection/antiinfectives (cultures negative so far):                 - Meropenem (6/29 - present)                - Vancomycin (6/29 - 6/30)     - Micafungin (6/29 - 7/1)   Intervention:    6/30 - Left IR RP perc drain (24F)    7/2 - Diagnostic paracentesis (WBC 2663 - 74% PMN, SAAG <1.1, protein 4.1)     7/5 - Repeat  "paracentesis (WBC 1801 - 61% PMN, lipase 48)    -Drain management per IR  -Continue antibiotics, follow cultures (continue meropenem for now, consider ID consult in the coming days to discuss duration of abx with continued negative cultures and good source control)    #. Severe malnutrition in the context of chronic illness  Patient with very poor oral intake over the course of his illness (almost 2 months) and is not meeting calorie requirements. Ideally would place a percutaneous feeding tube for more permanent route for providing enteral nutrition. He has had NJT in the past but has pulled them out multiple times. Of course there is risk with the procedure given his medical co-morbidities (bleeding, infection, risk of anesthesia) but at this point patient wishes to proceed with PEG-J.   -PEG-J placement planned for tomorrow  -Please obtain therapeutic paracentesis prior to placement  -Diet as tolerated, calorie counts (NPO at midnight)     #. Alcohol hepatitis  #. Hyperbilirubinemia  #. Ascites  #. Coagulopathy/Thrombocytopenia  #. Encephalopathy  Known heavy alcohol use. Reportedly received course of steroids at OSH for 1 week with \"some improvement\". Currently has primarily hyperbilirubinemia and mildly elevated AST. Unclear if he has underlying cirrhosis but certainly would fit with alcohol hepatitis vs elevated liver tests of critical illness/sepsis. Not candidate for steroids right now. He is receiving lactulose and Xifaxin for encephalopathy but having a lot of stool output. Hepatology notified of admission, ongoing discussions with them. Not currently liver transplant candidate.  -Trend MELD labs  Current MELD is 37, associated with at minimum 50% mortality in the next 3 months   MELD-Na score: 37 at 7/11/2021  6:07 AM  MELD score: 36 at 7/11/2021  6:07 AM  Calculated from:  Serum Creatinine: 3.20 mg/dL at 7/11/2021  6:07 AM  Serum Sodium: 126 mmol/L at 7/11/2021  6:07 AM  Total Bilirubin: 17.3 mg/dL at " "7/11/2021  6:07 AM  INR(ratio): 1.91 at 7/11/2021  6:07 AM  Age: 30 years  -Continue lactulose and titrate to 3 soft stools per day -- please record stool output  -Alcohol abstinence    This patient has a very poor overall prognosis and agree with palliative care consultation and GOC discussions with the family    The patient was discussed and plan agreed upon with GI staff, Dr Suman Norwood, PA-C  Advanced Endoscopy/Pancreaticobiliary GI Service  [facility]  Text Page  _______________________________________________________________      Subjective: Nursing notes and 24hr events reviewed. Patient seen and examined at 1145 while in dialysis with the internal medicine team. Reports that there is \"no way\" he can eat enough food to maintain his nutrition. He also hates the NGT.    ROS:   4 pt ROS negative unless noted in subjective.     Objective:  Blood pressure 99/61, pulse 120, temperature 98.5  F (36.9  C), temperature source Oral, resp. rate 14, height 1.676 m (5' 6\"), weight 71 kg (156 lb 8.4 oz), SpO2 100 %.  Gen: Laying in bed. Appears comfortable  HEENT: NCAT. Conjunctiva clear. Sclera ++icteric, poor dentition, NGT with clear output  CV: tachycardic, Peripheral perfusion intact  Resp: non labored breathing  Abd: minimal distention, minimal dark red/brown output in perc drain  Msk: no gross deformity  Skin:  ++ jaundice  Ext: warm, well perfused  Neuro: non specific  Mental status/Psych: alert and oriented, flat affect        LABS:  [facility]  Recent Labs   Lab 07/11/21  0607 07/10/21  0625 07/09/21  0626 07/08/21  0538   * 131* 129* 131*   POTASSIUM 4.6 3.9 3.7 3.9   CHLORIDE 96 99 97 99   JANENE 8.3* 8.4* 8.5 8.9   CO2 20 22 20 23   BUN 41* 24 43* 27   CR 3.20* 2.21* 3.37* 2.60*   GLC 95 105* 92 97     CBC  Recent Labs   Lab 07/11/21  0607 07/10/21  0625 07/09/21  0626 07/08/21  0538   WBC 25.8* 23.6* 24.7* 25.9*   RBC 2.34* 1.99* 2.15* 2.30*   HGB 7.5* 6.4* 7.0* " 7.4*   HCT 22.7* 19.6* 21.2* 22.6*   MCV 97 99 99 98   MCH 32.1 32.2 32.6 32.2   MCHC 33.0 32.7 33.0 32.7   RDW 22.1* 23.4* 23.5* 24.4*    170 191 181     INR  Recent Labs   Lab 07/11/21  0607 07/10/21  0625 07/09/21  0626   INR 1.91* 1.86* 1.90*     LFTs  Recent Labs   Lab 07/11/21  0607 07/10/21  0625 07/09/21  0626 07/08/21  0538   ALKPHOS 177* 187* 181* 185*   AST 79* 108* 114* 132*   ALT 21 24 24 29   BILITOTAL 17.3* 17.7* 20.2* 22.2*   PROTTOTAL 7.5 7.8 7.6 7.8   ALBUMIN 2.8* 3.1* 3.0* 3.3*      PANC  No lab results found in last 7 days.      IMAGING:  EXAMINATION: CT ABDOMEN PELVIS W/O CONTRAST, 7/11/2021 11:21 AM                                                                   IMPRESSION:   1. Sequela of necrotizing pancreatitis with highly decreased size of  walled off necrosis centered in the lesser sac. Percutaneous drain  remains in place.  2. Loculated moderate ascites is unchanged.  3. Hepatomegaly, hepatic steatosis, and evidence of portal  hypertension again seen.  4. Small bilateral pleural effusions are slightly improved.

## 2021-07-13 ENCOUNTER — APPOINTMENT (OUTPATIENT)
Dept: GENERAL RADIOLOGY | Facility: CLINIC | Age: 31
End: 2021-07-13
Attending: INTERNAL MEDICINE
Payer: MEDICAID

## 2021-07-13 ENCOUNTER — ANESTHESIA EVENT (OUTPATIENT)
Dept: SURGERY | Facility: CLINIC | Age: 31
End: 2021-07-13
Payer: MEDICAID

## 2021-07-13 ENCOUNTER — APPOINTMENT (OUTPATIENT)
Dept: OCCUPATIONAL THERAPY | Facility: CLINIC | Age: 31
End: 2021-07-13
Attending: INTERNAL MEDICINE
Payer: MEDICAID

## 2021-07-13 ENCOUNTER — ANESTHESIA (OUTPATIENT)
Dept: SURGERY | Facility: CLINIC | Age: 31
End: 2021-07-13
Payer: MEDICAID

## 2021-07-13 LAB
ABO/RH(D): NORMAL
ABO/RH(D): NORMAL
ALBUMIN SERPL-MCNC: 2.6 G/DL (ref 3.4–5)
ALBUMIN SERPL-MCNC: 2.7 G/DL (ref 3.4–5)
ALP SERPL-CCNC: 167 U/L (ref 40–150)
ALP SERPL-CCNC: 169 U/L (ref 40–150)
ALT SERPL W P-5'-P-CCNC: 15 U/L (ref 0–70)
ALT SERPL W P-5'-P-CCNC: 17 U/L (ref 0–70)
ANION GAP SERPL CALCULATED.3IONS-SCNC: 10 MMOL/L (ref 3–14)
ANION GAP SERPL CALCULATED.3IONS-SCNC: 12 MMOL/L (ref 3–14)
ANTIBODY SCREEN: NEGATIVE
ANTIBODY SCREEN: NEGATIVE
AST SERPL W P-5'-P-CCNC: 60 U/L (ref 0–45)
AST SERPL W P-5'-P-CCNC: 60 U/L (ref 0–45)
BILIRUB SERPL-MCNC: 13.9 MG/DL (ref 0.2–1.3)
BILIRUB SERPL-MCNC: 14.6 MG/DL (ref 0.2–1.3)
BLD PROD TYP BPU: NORMAL
BLOOD COMPONENT TYPE: NORMAL
BUN SERPL-MCNC: 21 MG/DL (ref 7–30)
BUN SERPL-MCNC: 29 MG/DL (ref 7–30)
CALCIUM SERPL-MCNC: 8.5 MG/DL (ref 8.5–10.1)
CALCIUM SERPL-MCNC: 8.6 MG/DL (ref 8.5–10.1)
CHLORIDE BLD-SCNC: 97 MMOL/L (ref 94–109)
CHLORIDE BLD-SCNC: 98 MMOL/L (ref 94–109)
CO2 SERPL-SCNC: 21 MMOL/L (ref 20–32)
CO2 SERPL-SCNC: 23 MMOL/L (ref 20–32)
CODING SYSTEM: NORMAL
CREAT SERPL-MCNC: 2.06 MG/DL (ref 0.66–1.25)
CREAT SERPL-MCNC: 2.6 MG/DL (ref 0.66–1.25)
CROSSMATCH: NORMAL
ERYTHROCYTE [DISTWIDTH] IN BLOOD BY AUTOMATED COUNT: 21.2 % (ref 10–15)
GFR SERPL CREATININE-BSD FRML MDRD: 31 ML/MIN/1.73M2
GFR SERPL CREATININE-BSD FRML MDRD: 42 ML/MIN/1.73M2
GLUCOSE BLD-MCNC: 91 MG/DL (ref 70–99)
GLUCOSE BLD-MCNC: 94 MG/DL (ref 70–99)
GLUCOSE BLDC GLUCOMTR-MCNC: 101 MG/DL (ref 70–99)
GRAM STAIN RESULT: NORMAL
GRAM STAIN RESULT: NORMAL
HCT VFR BLD AUTO: 20.4 % (ref 40–53)
HGB BLD-MCNC: 6.7 G/DL (ref 13.3–17.7)
INR PPP: 1.78 (ref 0.85–1.15)
LACTATE SERPL-SCNC: 0.9 MMOL/L (ref 0.7–2)
MCH RBC QN AUTO: 31.9 PG (ref 26.5–33)
MCHC RBC AUTO-ENTMCNC: 32.8 G/DL (ref 31.5–36.5)
MCV RBC AUTO: 97 FL (ref 78–100)
PLATELET # BLD AUTO: 211 10E3/UL (ref 150–450)
POTASSIUM BLD-SCNC: 3.6 MMOL/L (ref 3.4–5.3)
POTASSIUM BLD-SCNC: 3.7 MMOL/L (ref 3.4–5.3)
PROT FLD-MCNC: 4.1 G/DL
PROT SERPL-MCNC: 6.9 G/DL (ref 6.8–8.8)
PROT SERPL-MCNC: 7.1 G/DL (ref 6.8–8.8)
RBC # BLD AUTO: 2.1 10E6/UL (ref 4.4–5.9)
SODIUM SERPL-SCNC: 130 MMOL/L (ref 133–144)
SODIUM SERPL-SCNC: 131 MMOL/L (ref 133–144)
SPECIMEN EXPIRATION DATE: NORMAL
SPECIMEN EXPIRATION DATE: NORMAL
UNIT ABO/RH: NORMAL
UNIT NUMBER: NORMAL
UNIT STATUS: NORMAL
UNIT TYPE ISBT: 5100
UPPER GI ENDOSCOPY: NORMAL
WBC # BLD AUTO: 21.3 10E3/UL (ref 4–11)

## 2021-07-13 PROCEDURE — 82962 GLUCOSE BLOOD TEST: CPT

## 2021-07-13 PROCEDURE — 999N000157 HC STATISTIC RCP TIME EA 10 MIN

## 2021-07-13 PROCEDURE — 250N000013 HC RX MED GY IP 250 OP 250 PS 637: Performed by: STUDENT IN AN ORGANIZED HEALTH CARE EDUCATION/TRAINING PROGRAM

## 2021-07-13 PROCEDURE — 89051 BODY FLUID CELL COUNT: CPT | Performed by: INTERNAL MEDICINE

## 2021-07-13 PROCEDURE — 86850 RBC ANTIBODY SCREEN: CPT | Performed by: INTERNAL MEDICINE

## 2021-07-13 PROCEDURE — 370N000017 HC ANESTHESIA TECHNICAL FEE, PER MIN: Performed by: INTERNAL MEDICINE

## 2021-07-13 PROCEDURE — 250N000025 HC SEVOFLURANE, PER MIN: Performed by: INTERNAL MEDICINE

## 2021-07-13 PROCEDURE — 250N000009 HC RX 250

## 2021-07-13 PROCEDURE — 84157 ASSAY OF PROTEIN OTHER: CPT | Performed by: INTERNAL MEDICINE

## 2021-07-13 PROCEDURE — 36415 COLL VENOUS BLD VENIPUNCTURE: CPT | Performed by: INTERNAL MEDICINE

## 2021-07-13 PROCEDURE — 49083 ABD PARACENTESIS W/IMAGING: CPT | Mod: GC | Performed by: PEDIATRICS

## 2021-07-13 PROCEDURE — 0DHA4UZ INSERTION OF FEEDING DEVICE INTO JEJUNUM, PERCUTANEOUS ENDOSCOPIC APPROACH: ICD-10-PCS | Performed by: INTERNAL MEDICINE

## 2021-07-13 PROCEDURE — 85610 PROTHROMBIN TIME: CPT | Performed by: STUDENT IN AN ORGANIZED HEALTH CARE EDUCATION/TRAINING PROGRAM

## 2021-07-13 PROCEDURE — 250N000011 HC RX IP 250 OP 636: Performed by: INTERNAL MEDICINE

## 2021-07-13 PROCEDURE — 999N000141 HC STATISTIC PRE-PROCEDURE NURSING ASSESSMENT: Performed by: INTERNAL MEDICINE

## 2021-07-13 PROCEDURE — 83735 ASSAY OF MAGNESIUM: CPT | Performed by: STUDENT IN AN ORGANIZED HEALTH CARE EDUCATION/TRAINING PROGRAM

## 2021-07-13 PROCEDURE — 250N000013 HC RX MED GY IP 250 OP 250 PS 637: Performed by: INTERNAL MEDICINE

## 2021-07-13 PROCEDURE — 85027 COMPLETE CBC AUTOMATED: CPT | Performed by: STUDENT IN AN ORGANIZED HEALTH CARE EDUCATION/TRAINING PROGRAM

## 2021-07-13 PROCEDURE — 87205 SMEAR GRAM STAIN: CPT | Performed by: INTERNAL MEDICINE

## 2021-07-13 PROCEDURE — 83605 ASSAY OF LACTIC ACID: CPT | Performed by: STUDENT IN AN ORGANIZED HEALTH CARE EDUCATION/TRAINING PROGRAM

## 2021-07-13 PROCEDURE — 99233 SBSQ HOSP IP/OBS HIGH 50: CPT | Mod: GC | Performed by: INTERNAL MEDICINE

## 2021-07-13 PROCEDURE — 258N000003 HC RX IP 258 OP 636: Performed by: NURSE ANESTHETIST, CERTIFIED REGISTERED

## 2021-07-13 PROCEDURE — 86901 BLOOD TYPING SEROLOGIC RH(D): CPT | Performed by: STUDENT IN AN ORGANIZED HEALTH CARE EDUCATION/TRAINING PROGRAM

## 2021-07-13 PROCEDURE — 36415 COLL VENOUS BLD VENIPUNCTURE: CPT | Performed by: STUDENT IN AN ORGANIZED HEALTH CARE EDUCATION/TRAINING PROGRAM

## 2021-07-13 PROCEDURE — 250N000009 HC RX 250: Performed by: INTERNAL MEDICINE

## 2021-07-13 PROCEDURE — 250N000013 HC RX MED GY IP 250 OP 250 PS 637

## 2021-07-13 PROCEDURE — C1769 GUIDE WIRE: HCPCS | Performed by: INTERNAL MEDICINE

## 2021-07-13 PROCEDURE — 710N000009 HC RECOVERY PHASE 1, LEVEL 1, PER MIN: Performed by: INTERNAL MEDICINE

## 2021-07-13 PROCEDURE — 87070 CULTURE OTHR SPECIMN AEROBIC: CPT | Performed by: INTERNAL MEDICINE

## 2021-07-13 PROCEDURE — 250N000011 HC RX IP 250 OP 636: Performed by: NURSE PRACTITIONER

## 2021-07-13 PROCEDURE — 250N000011 HC RX IP 250 OP 636: Performed by: STUDENT IN AN ORGANIZED HEALTH CARE EDUCATION/TRAINING PROGRAM

## 2021-07-13 PROCEDURE — 86901 BLOOD TYPING SEROLOGIC RH(D): CPT | Performed by: INTERNAL MEDICINE

## 2021-07-13 PROCEDURE — 0DH63UZ INSERTION OF FEEDING DEVICE INTO STOMACH, PERCUTANEOUS APPROACH: ICD-10-PCS | Performed by: INTERNAL MEDICINE

## 2021-07-13 PROCEDURE — 80053 COMPREHEN METABOLIC PANEL: CPT | Performed by: STUDENT IN AN ORGANIZED HEALTH CARE EDUCATION/TRAINING PROGRAM

## 2021-07-13 PROCEDURE — 255N000002 HC RX 255 OP 636: Performed by: INTERNAL MEDICINE

## 2021-07-13 PROCEDURE — 97110 THERAPEUTIC EXERCISES: CPT | Mod: GO | Performed by: OCCUPATIONAL THERAPIST

## 2021-07-13 PROCEDURE — C1894 INTRO/SHEATH, NON-LASER: HCPCS | Performed by: INTERNAL MEDICINE

## 2021-07-13 PROCEDURE — 84100 ASSAY OF PHOSPHORUS: CPT | Performed by: STUDENT IN AN ORGANIZED HEALTH CARE EDUCATION/TRAINING PROGRAM

## 2021-07-13 PROCEDURE — 360N000082 HC SURGERY LEVEL 2 W/ FLUORO, PER MIN: Performed by: INTERNAL MEDICINE

## 2021-07-13 PROCEDURE — 97535 SELF CARE MNGMENT TRAINING: CPT | Mod: GO | Performed by: OCCUPATIONAL THERAPIST

## 2021-07-13 PROCEDURE — 86923 COMPATIBILITY TEST ELECTRIC: CPT | Performed by: STUDENT IN AN ORGANIZED HEALTH CARE EDUCATION/TRAINING PROGRAM

## 2021-07-13 PROCEDURE — 250N000011 HC RX IP 250 OP 636: Performed by: NURSE ANESTHETIST, CERTIFIED REGISTERED

## 2021-07-13 PROCEDURE — 36592 COLLECT BLOOD FROM PICC: CPT | Performed by: STUDENT IN AN ORGANIZED HEALTH CARE EDUCATION/TRAINING PROGRAM

## 2021-07-13 PROCEDURE — 999N000179 XR SURGERY CARM FLUORO LESS THAN 5 MIN W STILLS: Mod: TC

## 2021-07-13 PROCEDURE — 86850 RBC ANTIBODY SCREEN: CPT | Performed by: STUDENT IN AN ORGANIZED HEALTH CARE EDUCATION/TRAINING PROGRAM

## 2021-07-13 PROCEDURE — 272N000001 HC OR GENERAL SUPPLY STERILE: Performed by: INTERNAL MEDICINE

## 2021-07-13 PROCEDURE — 250N000011 HC RX IP 250 OP 636: Performed by: ANESTHESIOLOGY

## 2021-07-13 PROCEDURE — 0W9G3ZZ DRAINAGE OF PERITONEAL CAVITY, PERCUTANEOUS APPROACH: ICD-10-PCS | Performed by: PEDIATRICS

## 2021-07-13 PROCEDURE — 120N000005 HC R&B MS OVERFLOW UMMC

## 2021-07-13 RX ORDER — B COMPLEX C NO.10/FOLIC ACID 900MCG/5ML
5 LIQUID (ML) ORAL DAILY
Status: DISCONTINUED | OUTPATIENT
Start: 2021-07-13 | End: 2021-10-12 | Stop reason: HOSPADM

## 2021-07-13 RX ORDER — NOREPINEPHRINE BITARTRATE 0.06 MG/ML
.01-.6 INJECTION, SOLUTION INTRAVENOUS CONTINUOUS
Status: DISCONTINUED | OUTPATIENT
Start: 2021-07-13 | End: 2021-07-13 | Stop reason: HOSPADM

## 2021-07-13 RX ORDER — MEPERIDINE HYDROCHLORIDE 25 MG/ML
12.5 INJECTION INTRAMUSCULAR; INTRAVENOUS; SUBCUTANEOUS EVERY 5 MIN PRN
Status: DISCONTINUED | OUTPATIENT
Start: 2021-07-13 | End: 2021-07-13 | Stop reason: HOSPADM

## 2021-07-13 RX ORDER — HYDROMORPHONE HCL IN WATER/PF 6 MG/30 ML
.2-.4 PATIENT CONTROLLED ANALGESIA SYRINGE INTRAVENOUS EVERY 5 MIN PRN
Status: DISCONTINUED | OUTPATIENT
Start: 2021-07-13 | End: 2021-07-13 | Stop reason: HOSPADM

## 2021-07-13 RX ORDER — PROMETHAZINE HYDROCHLORIDE 25 MG/1
25 TABLET ORAL EVERY 6 HOURS PRN
Status: DISCONTINUED | OUTPATIENT
Start: 2021-07-13 | End: 2021-08-18

## 2021-07-13 RX ORDER — SODIUM CHLORIDE 9 MG/ML
INJECTION, SOLUTION INTRAVENOUS CONTINUOUS PRN
Status: DISCONTINUED | OUTPATIENT
Start: 2021-07-13 | End: 2021-07-13

## 2021-07-13 RX ORDER — HYDRALAZINE HYDROCHLORIDE 20 MG/ML
2.5-5 INJECTION INTRAMUSCULAR; INTRAVENOUS EVERY 10 MIN PRN
Status: DISCONTINUED | OUTPATIENT
Start: 2021-07-13 | End: 2021-07-13 | Stop reason: HOSPADM

## 2021-07-13 RX ORDER — ONDANSETRON 4 MG/1
4 TABLET, ORALLY DISINTEGRATING ORAL EVERY 30 MIN PRN
Status: DISCONTINUED | OUTPATIENT
Start: 2021-07-13 | End: 2021-07-13 | Stop reason: HOSPADM

## 2021-07-13 RX ORDER — SODIUM CHLORIDE, SODIUM LACTATE, POTASSIUM CHLORIDE, CALCIUM CHLORIDE 600; 310; 30; 20 MG/100ML; MG/100ML; MG/100ML; MG/100ML
INJECTION, SOLUTION INTRAVENOUS CONTINUOUS
Status: DISCONTINUED | OUTPATIENT
Start: 2021-07-13 | End: 2021-07-13 | Stop reason: HOSPADM

## 2021-07-13 RX ORDER — PROMETHAZINE HYDROCHLORIDE 12.5 MG/1
12.5 SUPPOSITORY RECTAL EVERY 6 HOURS PRN
Status: DISCONTINUED | OUTPATIENT
Start: 2021-07-13 | End: 2021-08-18

## 2021-07-13 RX ORDER — ONDANSETRON 2 MG/ML
4 INJECTION INTRAMUSCULAR; INTRAVENOUS EVERY 30 MIN PRN
Status: DISCONTINUED | OUTPATIENT
Start: 2021-07-13 | End: 2021-07-13 | Stop reason: HOSPADM

## 2021-07-13 RX ORDER — DEXAMETHASONE SODIUM PHOSPHATE 4 MG/ML
INJECTION, SOLUTION INTRA-ARTICULAR; INTRALESIONAL; INTRAMUSCULAR; INTRAVENOUS; SOFT TISSUE PRN
Status: DISCONTINUED | OUTPATIENT
Start: 2021-07-13 | End: 2021-07-13

## 2021-07-13 RX ORDER — DEXTROSE MONOHYDRATE 100 MG/ML
INJECTION, SOLUTION INTRAVENOUS CONTINUOUS PRN
Status: DISCONTINUED | OUTPATIENT
Start: 2021-07-13 | End: 2021-10-12 | Stop reason: HOSPADM

## 2021-07-13 RX ORDER — IOPAMIDOL 510 MG/ML
INJECTION, SOLUTION INTRAVASCULAR PRN
Status: DISCONTINUED | OUTPATIENT
Start: 2021-07-13 | End: 2021-07-13

## 2021-07-13 RX ORDER — LABETALOL HYDROCHLORIDE 5 MG/ML
10 INJECTION, SOLUTION INTRAVENOUS
Status: DISCONTINUED | OUTPATIENT
Start: 2021-07-13 | End: 2021-07-13 | Stop reason: HOSPADM

## 2021-07-13 RX ORDER — HYDROXYZINE HYDROCHLORIDE 25 MG/1
50 TABLET, FILM COATED ORAL EVERY 6 HOURS PRN
Status: DISCONTINUED | OUTPATIENT
Start: 2021-07-13 | End: 2021-08-18

## 2021-07-13 RX ORDER — ONDANSETRON 2 MG/ML
INJECTION INTRAMUSCULAR; INTRAVENOUS PRN
Status: DISCONTINUED | OUTPATIENT
Start: 2021-07-13 | End: 2021-07-13

## 2021-07-13 RX ORDER — FENTANYL CITRATE 50 UG/ML
25-50 INJECTION, SOLUTION INTRAMUSCULAR; INTRAVENOUS EVERY 5 MIN PRN
Status: DISCONTINUED | OUTPATIENT
Start: 2021-07-13 | End: 2021-07-13 | Stop reason: HOSPADM

## 2021-07-13 RX ORDER — LIDOCAINE 40 MG/G
CREAM TOPICAL
Status: DISCONTINUED | OUTPATIENT
Start: 2021-07-13 | End: 2021-07-13 | Stop reason: HOSPADM

## 2021-07-13 RX ORDER — HYDROXYZINE HYDROCHLORIDE 25 MG/1
25 TABLET, FILM COATED ORAL EVERY 6 HOURS PRN
Status: DISCONTINUED | OUTPATIENT
Start: 2021-07-13 | End: 2021-08-18

## 2021-07-13 RX ORDER — PROPOFOL 10 MG/ML
INJECTION, EMULSION INTRAVENOUS PRN
Status: DISCONTINUED | OUTPATIENT
Start: 2021-07-13 | End: 2021-07-13

## 2021-07-13 RX ORDER — CEFAZOLIN SODIUM 2 G/100ML
INJECTION, SOLUTION INTRAVENOUS PRN
Status: DISCONTINUED | OUTPATIENT
Start: 2021-07-13 | End: 2021-07-13

## 2021-07-13 RX ORDER — FENTANYL CITRATE 50 UG/ML
INJECTION, SOLUTION INTRAMUSCULAR; INTRAVENOUS PRN
Status: DISCONTINUED | OUTPATIENT
Start: 2021-07-13 | End: 2021-07-13

## 2021-07-13 RX ADMIN — Medication 550 MG: at 21:05

## 2021-07-13 RX ADMIN — HYDROMORPHONE HYDROCHLORIDE 2 MG: 1 SOLUTION ORAL at 21:05

## 2021-07-13 RX ADMIN — FENTANYL CITRATE 100 MCG: 50 INJECTION, SOLUTION INTRAMUSCULAR; INTRAVENOUS at 11:42

## 2021-07-13 RX ADMIN — PHENYLEPHRINE HYDROCHLORIDE 200 MCG: 10 INJECTION INTRAVENOUS at 12:00

## 2021-07-13 RX ADMIN — THIAMINE HCL TAB 100 MG 100 MG: 100 TAB at 08:55

## 2021-07-13 RX ADMIN — PHENYLEPHRINE HYDROCHLORIDE 100 MCG: 10 INJECTION INTRAVENOUS at 12:16

## 2021-07-13 RX ADMIN — PHENYLEPHRINE HYDROCHLORIDE 100 MCG: 10 INJECTION INTRAVENOUS at 12:13

## 2021-07-13 RX ADMIN — PHENYLEPHRINE HYDROCHLORIDE 200 MCG: 10 INJECTION INTRAVENOUS at 12:22

## 2021-07-13 RX ADMIN — Medication 40 MG: at 08:57

## 2021-07-13 RX ADMIN — LACTULOSE 20 G: 10 SOLUTION ORAL; RECTAL at 21:05

## 2021-07-13 RX ADMIN — PHENYLEPHRINE HYDROCHLORIDE 100 MCG: 10 INJECTION INTRAVENOUS at 12:19

## 2021-07-13 RX ADMIN — MELATONIN TAB 3 MG 6 MG: 3 TAB at 22:11

## 2021-07-13 RX ADMIN — Medication 5 ML: at 17:58

## 2021-07-13 RX ADMIN — Medication 550 MG: at 08:54

## 2021-07-13 RX ADMIN — POTASSIUM CHLORIDE 20 MEQ: 1.5 POWDER, FOR SOLUTION ORAL at 21:05

## 2021-07-13 RX ADMIN — MEROPENEM 500 MG: 500 INJECTION, POWDER, FOR SOLUTION INTRAVENOUS at 21:05

## 2021-07-13 RX ADMIN — ONDANSETRON 4 MG: 2 INJECTION INTRAMUSCULAR; INTRAVENOUS at 16:01

## 2021-07-13 RX ADMIN — FOLIC ACID 1 MG: 1 TABLET ORAL at 08:55

## 2021-07-13 RX ADMIN — PROPOFOL 120 MG: 10 INJECTION, EMULSION INTRAVENOUS at 11:53

## 2021-07-13 RX ADMIN — HYDROMORPHONE HYDROCHLORIDE 0.4 MG: 1 INJECTION, SOLUTION INTRAMUSCULAR; INTRAVENOUS; SUBCUTANEOUS at 13:38

## 2021-07-13 RX ADMIN — PHENYLEPHRINE HYDROCHLORIDE 200 MCG: 10 INJECTION INTRAVENOUS at 11:52

## 2021-07-13 RX ADMIN — HYDROXYZINE HYDROCHLORIDE 25 MG: 25 TABLET, FILM COATED ORAL at 22:11

## 2021-07-13 RX ADMIN — POTASSIUM CHLORIDE 20 MEQ: 1.5 POWDER, FOR SOLUTION ORAL at 08:55

## 2021-07-13 RX ADMIN — ACETAMINOPHEN 325 MG: 325 SOLUTION ORAL at 08:55

## 2021-07-13 RX ADMIN — DEXAMETHASONE SODIUM PHOSPHATE 4 MG: 4 INJECTION, SOLUTION INTRA-ARTICULAR; INTRALESIONAL; INTRAMUSCULAR; INTRAVENOUS; SOFT TISSUE at 12:04

## 2021-07-13 RX ADMIN — ONDANSETRON 4 MG: 2 INJECTION INTRAMUSCULAR; INTRAVENOUS at 08:54

## 2021-07-13 RX ADMIN — HYDROMORPHONE HYDROCHLORIDE 2 MG: 1 SOLUTION ORAL at 16:02

## 2021-07-13 RX ADMIN — LACTULOSE 20 G: 10 SOLUTION ORAL; RECTAL at 08:55

## 2021-07-13 RX ADMIN — HYDROMORPHONE HYDROCHLORIDE 2 MG: 1 SOLUTION ORAL at 06:28

## 2021-07-13 RX ADMIN — Medication 100 MG: at 11:55

## 2021-07-13 RX ADMIN — Medication 2 G: at 12:05

## 2021-07-13 RX ADMIN — HYDROMORPHONE HYDROCHLORIDE 2 MG: 1 SOLUTION ORAL at 01:35

## 2021-07-13 RX ADMIN — HYDROXYZINE HYDROCHLORIDE 50 MG: 25 TABLET, FILM COATED ORAL at 16:02

## 2021-07-13 RX ADMIN — SODIUM CHLORIDE: 9 INJECTION, SOLUTION INTRAVENOUS at 11:34

## 2021-07-13 RX ADMIN — PROCHLORPERAZINE EDISYLATE 5 MG: 5 INJECTION INTRAMUSCULAR; INTRAVENOUS at 01:35

## 2021-07-13 RX ADMIN — ONDANSETRON 4 MG: 2 INJECTION INTRAMUSCULAR; INTRAVENOUS at 12:04

## 2021-07-13 ASSESSMENT — ACTIVITIES OF DAILY LIVING (ADL)
ADLS_ACUITY_SCORE: 18
ADLS_ACUITY_SCORE: 20
ADLS_ACUITY_SCORE: 18
ADLS_ACUITY_SCORE: 18
ADLS_ACUITY_SCORE: 20

## 2021-07-13 ASSESSMENT — LIFESTYLE VARIABLES: TOBACCO_USE: 1

## 2021-07-13 ASSESSMENT — MIFFLIN-ST. JEOR: SCORE: 1587.75

## 2021-07-13 NOTE — PROVIDER NOTIFICATION
SBAR completed with 6B RN.  Updated on PRBC status.  Patient to receive transfusion on floor, MD aware.    Paged MD for orders.

## 2021-07-13 NOTE — ANESTHESIA PREPROCEDURE EVALUATION
Anesthesia Pre-Procedure Evaluation    Patient: Dax Villareal   MRN: [id number] : 1990        Preoperative Diagnosis: Severe malnutrition (H) [E43]   Procedure : Procedure(s):  INSERTION, PEG-J TUBE     Past Medical History:   Diagnosis Date     ADHD       Past Surgical History:   Procedure Laterality Date     ORTHOPEDIC SURGERY Right     fracture repair      Allergies   Allergen Reactions     Tylenol [Acetaminophen] Itching      Social History     Tobacco Use     Smoking status: Current Every Day Smoker     Smokeless tobacco: Never Used   Substance Use Topics     Alcohol use: Yes      Wt Readings from Last 1 Encounters:   21 69 kg (152 lb 1.9 oz)        Anesthesia Evaluation   Pt has had prior anesthetic. Type: General.    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  Necrotizing pancreatitis 2/2 EtOH      Renal/Genitourinary:     (+) renal disease (2/2 sepsis; aneuric; MWF), Pt requires dialysis, type: Hemodialysis,     Endo:       Psychiatric/Substance Use:       Infectious Disease:       Malignancy:       Other:            Physical Exam    Airway        Mallampati: II   TM distance: > 3 FB   Neck ROM: full   Mouth opening: > 3 cm    Respiratory Devices and Support         Dental     Comment: Overall poor dentition        Cardiovascular          Rhythm and rate: regular and normal     Pulmonary           breath sounds clear to auscultation           OUTSIDE LABS:  CBC:   Lab Results   Component Value Date    WBC 21.3 (H) 2021    WBC 24.6 (H) 2021    HGB 6.7 (LL) 2021    HGB 7.4 (L) 2021    HCT 20.4  (L) 07/13/2021    HCT 21.3 (L) 07/12/2021     07/13/2021     07/12/2021     BMP:   Lab Results   Component Value Date     (L) 07/13/2021     (L) 07/12/2021    POTASSIUM 3.6 07/13/2021    POTASSIUM 3.7 07/12/2021    CHLORIDE 98 07/13/2021    CHLORIDE 97 07/12/2021    CO2 23 07/13/2021    CO2 21 07/12/2021    BUN 29 07/13/2021    BUN 21 07/12/2021    CR 2.60 (H) 07/13/2021    CR 2.06 (H) 07/12/2021     (H) 07/13/2021    GLC 94 07/13/2021     COAGS:   Lab Results   Component Value Date    PTT 97 (H) 06/28/2021    INR 1.78 (H) 07/13/2021    FIBR 225 06/28/2021     POC:   Lab Results   Component Value Date     (H) 07/05/2021     HEPATIC:   Lab Results   Component Value Date    ALBUMIN 2.6 (L) 07/13/2021    PROTTOTAL 6.9 07/13/2021    ALT 15 07/13/2021    AST 60 (H) 07/13/2021    ALKPHOS 167 (H) 07/13/2021    BILITOTAL 13.9 (H) 07/13/2021    ADALBERTO 28 06/28/2021     OTHER:   Lab Results   Component Value Date    LACT 0.9 07/13/2021    JANENE 8.5 07/13/2021    PHOS 3.9 07/07/2021    MAG 2.3 07/05/2021    LIPASE 199 06/28/2021    CRP 16.9 (H) 10/06/2020       Anesthesia Plan    ASA Status:  4      Anesthesia Type: General.     - Airway: ETT         Techniques and Equipment:     - Lines/Monitors: PICC in situ     Consents    Anesthesia Plan(s) and associated risks, benefits, and realistic alternatives discussed. Questions answered and patient/representative(s) expressed understanding.     - Discussed with:  Patient      - Extended Intubation/Ventilatory Support Discussed: Yes.      - Patient is DNR/DNI Status: No    Use of blood products discussed: Yes.     - Discussed with: Patient.     - Consented: consented to blood products            Reason for refusal: other.     Postoperative Care            Comments:                Severo Mejía MD

## 2021-07-13 NOTE — PROGRESS NOTES
Brief Nephrology Note      Pt to OR for PEG tube at time of my visit, had HD yesterday and plan to run tomorrow.  No signs of recovery, with his degree of liver dysfx the chances of recovery are low but we will continue to provide RRT as long as he tolerates from hemodynamic standpoint and he can functionally progress to outpt HD.      Tor Larsen, SOFIA CNS  Clinical Nurse Specialist  [phone]

## 2021-07-13 NOTE — PHARMACY-CONSULT NOTE
Pharmacy Tube Feeding Consult    Medication reviewed for administration by feeding tube and for potential food/drug interactions.    Recommendation: Recommend changing the following medications to a liquid dosage form:     -- melatonin      Pharmacy will continue to follow as new medications are ordered.    Darrin Rodriguez, Pharm.D, BCPS

## 2021-07-13 NOTE — PROGRESS NOTES
Calorie Count  Intake recorded for: 7/12  Total Kcals: 0 Total Protein: 0g  Kcals from Hospital Food: 0   Protein: 0g  Kcals from Outside Food (average):0 Protein: 0g  # Meals Recorded: 2 meals ordered, no intake recorded  # Supplements Recorded: 0    Per epic, intake of 75% at 5 pm and 50% at 8 pm, but not enough information was given to calculate calories and protein.

## 2021-07-13 NOTE — PLAN OF CARE
"BP (!) 90/39 (BP Location: Right arm)   Pulse 113   Temp 98.7  F (37.1  C) (Oral)   Resp 12   Ht 1.676 m (5' 6\")   Wt 69 kg (152 lb 1.9 oz)   SpO2 100%   BMI 24.55 kg/m        Neuro: A&Ox4. Calls as needed Scoring 0 on [place] over night.  Does however make repeated requests.  Cardiac: ST.   VSS.   Respiratory: Sating 98% on RA.  GI/: Pt is anauric and on HD through rt CVC line.. Multiple BM small soft  Diet/appetite: Tolerating NPO diet.   Activity:  Assist of 2, up to chair and in halls.  Pain: At acceptable level on current regimen.   Skin: No new deficits noted.  LDA's:PICC ,  left side drain    Plan: Continue with POC. Notify primary team with changes.    "

## 2021-07-13 NOTE — PROGRESS NOTES
RN RECOVERY NOTE  Assigned to precept pt nurse assigned to care for pt in PACU Alma RIVAS   Pt ARRIVE to PACU @ 1300  Tele strip printed and placed in written chart   Dr. Will with gastroenterology @ pt bedside in PACU @ 1305. States pt needs to receive another unit of PRBCs once returning to unit. Not an urgent administration.   Alma RIVAS provide report to 6B via telephone   Alma RIVAS request patient transfer order and post op orders from Dr. Will via text paged @ 1410  Alma RIVAS called Anesthesia for sign out.   PACU charge notified that pt is ready for transfer.   Pt transport placed and pending   Pt on PACU hold @ 1430. No transport available @ this time   Tiera S RN transporting with pt to 6B along with pt transport   In chart after pt transferred out of PACU to complete documentation and provide feedback to Alma RIVAS.

## 2021-07-13 NOTE — CONSULTS
Consult and Procedure Service - Procedure Note    Attending: Nelson Stearns  Resident: Woody Gregory  Procedure: Diagnostic and therapeutic paracentesis  Indication: Pancreatitis, therapeutic paracentesis prior to G-tube placement  Risk Assessment: Low  Pre-procedure diagnosis: Necrotizing pancreatitis complicated by SBP  Post-procedure diagnosis: Necrotizing pancreatitis complicated by SBP    The risks and benefits of the procedure were explained to the patient who expressed understanding and opted to proceed.  Consent was obtained and placed in the chart previously during this hospitalization.  A time out was performed.  An area of ascites was located and marked using ultrasound guidance in the right lower quadrant; the area was prepped and draped in the usual sterile fashion.  7 ml of 1% lidocaine was instilled and ascites located.  The paracentesis catheter and needle were inserted under real-time guidance until ascites obtained then the needle removed and the catheter advanced.  The apparatus was connected to vacuum bottles and a total of 900 ml of monica colored fluid removed.   A specimen was sent for analysis. The catheter was withdrawn and the area dressed.  Patient tolerated the procedure well without immediate complications.  Please contact the Consult and Procedure Service if any complications or concerns arise.     JENISE Gregory MD  Internal Medicine-Pediatrics, MP-[facility]  DOS:  7/13/2021

## 2021-07-13 NOTE — ANESTHESIA PROCEDURE NOTES
Airway       Patient location during procedure: OR       Procedure Start/Stop Times: 7/13/2021 11:57 AM  Staff -        CRNA: Schlatter, Charles Patrick, APRN CRNA       Performed By: CRNA  Consent for Airway        Urgency: elective  Indications and Patient Condition       Indications for airway management: gera-procedural       Induction type:intravenous       Mask difficulty assessment: 0 - not attempted    Final Airway Details       Final airway type: endotracheal airway       Successful airway: ETT - single  Endotracheal Airway Details        ETT size (mm): 7.5       Cuffed: yes       Successful intubation technique: video laryngoscopy       VL Blade Size: MAC 3       Grade View of Cords: 1       Adjucts: stylet       Position: Right       Measured from: gums/teeth       Secured at (cm): 22       Bite block used: None    Post intubation assessment        Placement verified by: capnometry, equal breath sounds and chest rise        Number of attempts at approach: 1       Number of other approaches attempted: 0       Secured with: plastic tape       Ease of procedure: easy       Dentition: Intact

## 2021-07-13 NOTE — OP NOTE
Upper GI Endoscopy 07/13/2021 11:41 AM [address] [place] [address] [phone]     Endoscopy Department   _______________________________________________________________________________   Patient Name: Dax Villareal              Procedure Date: 7/13/2021 11:41 AM   MRN: [id number]                       Account Number: [id number]   YOB: 1990             Admit Type: Inpatient   Age: 30                               Room: OR   Gender: Male                          Note Status: Finalized   Attending MD: Rayshawn Will MD   Total Sedation Time:   _______________________________________________________________________________       Procedure:           Upper GI endoscopy   Indications:         Malnutrition   Providers:           Rayshawn Will MD, Carole Friend RN, Cathryn Antony   Patient Profile:     Mr Villareal is a 29yo alcoholic who presented with                        concomitant alcoholic hepatitis and necrotizing                        pancreatitis among other acute critical medical issues                        who has overall stabilized however has continued to have                        poor oral intake. Moreover, he has an NG tube to assist                        with gastric secretions. He now proceeds to upper                        endoscopy for placement of a gastrojejunostomy tube with                        adjunct gastropexy.   Referring MD:        Brianna Burks   Medicines:           General Anesthesia, Ancef 2000 mg IV   Complications:       No immediate complications.   _______________________________________________________________________________   Procedure:           Pre-Anesthesia Assessment:                        - Prior to the procedure, a History and Physical was                        performed, and patient medications and allergies were                        reviewed. The patient is competent. The  risks and                        benefits of the procedure and the sedation options and                        risks were discussed with the patient. All questions                        were answered and informed consent was obtained. Patient                        identification and proposed procedure were verified by                        the nurse in the pre-procedure area. Mental Status                        Examination: alert and oriented. Airway Examination:                        Mallampati Class III (part of the uvula and soft palate                        visualized). Respiratory Examination: poor air movement.                        CV Examination: normal. ASA Grade Assessment: III - A                        patient with severe systemic disease. After reviewing                        the risks and benefits, the patient was deemed in                        satisfactory condition to undergo the procedure. The                        anesthesia plan was to use general anesthesia.                        Immediately prior to administration of medications, the                        patient was re-assessed for adequacy to receive                        sedatives. The heart rate, respiratory rate, oxygen                        saturations, blood pressure, adequacy of pulmonary                        ventilation, and response to care were monitored                        throughout the procedure. The physical status of the                        patient was re-assessed after the procedure. After                        obtaining informed consent, the endoscope was passed                        under direct vision. Throughout the procedure, the                        patient's blood pressure, pulse, and oxygen saturations                        were monitored continuously. The gastroscope was                        introduced through the mouth, and advanced to the                        duodenal bulb. The upper GI  "endoscopy was accomplished                        without difficulty. The patient tolerated the procedure                        well.                                                                                    Findings:        The patient was placed in the supine position for PEG placement.         films showed the tip of the NG tube as well as a percutaneous drain. A        white light evaluation of the foregut demonstrated grade 1 distal        esophageal varices without stigmata. The stomach was notable for diffuse        scaling without bleeding. The bulb and sweep were unremarkable. The        stomach was insufflated to appose gastric and abdominal walls. A site        was located in the antrum of the stomach with excellent        transillumination, manual external pressure and fluoroscopy for        placement. The abdominal wall was marked and prepped in a sterile        manner. The area was anesthetized with 2 mL of 1% lidocaine. The trocar        needle was introduced through the abdominal wall and into the stomach        under both fluoroscopic and direct endoscopic view. A snare was        introduced through the endoscope and opened in the gastric lumen. The        guide wire was passed through the trocar and into the open snare. The        snare was closed around the guide wire. The endoscope and snare were        removed, pulling the wire out through the mouth. A skin incision was        made at the site of needle insertion. The externally removable 24 Fr        gastrostomy tube was lubricated. The G-tube was tied to the guide wire        and pulled through the mouth and into the stomach. The trocar needle was        removed, and the gastrostomy tube was pulled out from the stomach        through the skin. Next we created a gastropexy by triangulating three T        tags along the fresh gastrostomy tract using endoscopy guidance. We then        cut the gastrostomy tube and passed an 0.035\" " Glidewire across the tube        into the stomach and the internal bumper was snared and removed. Over        the wire a 22F sheath dilater was passed and the sheath guided across        the pylorus. Under fluoroscopic guidance, the wire was passed to the        proximal jejunum and over the wire an 18F 45cm one piece        gastrojejunostomy tube was positioned without gastric curl and the tip        in the jejunum. The balloon was inflated with 8cc of sterile water and        the external bumper had a final moderately tight tension and compression        of the abdominal wall with the tube at just below 2cm.                                                                                     Impression:          - Grade 1 esophageal varices without stomach or duodenal                        varices; portal hypertensive scaling of the stomach                        - NG tube removed                        - Uncomplicated endoscopic and fluoroscopic placement of                        a 18F 45cm one piece gastrojejunostomy tube with adjunct                        gastropexy as detailed above   Recommendation:      - General anesthesia recovery with return to the floor                        when appropriate                        - Serial abdominal examinations at 2 and 4h, without                        suggestion or evidence of complication (increasing                        abdominal pain, bleeding), J tube may be used for feeds                        as directed by the dietitian                        - G tube may be to gravity or used for medications                        without delay                        - All medications and diet may resume without delay                       - White buttons of the T tags should be removed at 2w (if still present) by cutting the                      underlying blue strings                        - The findings and recommendations were discussed with                         the patient and their family                                                                                       electronically signed by REAGAN Will

## 2021-07-13 NOTE — ANESTHESIA POSTPROCEDURE EVALUATION
Patient: Dax Villareal    Procedure(s):  Upper endoscopy, INSERTION, PEG-J TUBE and Gastropexy    Diagnosis:Severe malnutrition (H) [E43]  Diagnosis Additional Information: No value filed.    Anesthesia Type:  General    Note:  Disposition: Admission   Postop Pain Control: Uneventful            Sign Out: Well controlled pain   PONV: No   Neuro/Psych: Uneventful            Sign Out: Acceptable/Baseline neuro status   Airway/Respiratory: Uneventful            Sign Out: Acceptable/Baseline resp. status   CV/Hemodynamics: Uneventful            Sign Out: Acceptable CV status; No obvious hypovolemia; No obvious fluid overload   Other NRE:             Procedure/ Equipment: Equipment failure   DID A NON-ROUTINE EVENT OCCUR? YES    Event details/Postop Comments:  CMAC unit malfunction.    The CMAC unit was verified to work appropriately with the CMAC blade before the patient was brought to the room, per CRNA.    Patient was taken to the OR and was induced with a rapid sequence induction due to significant NG output when NG tube was placed to suction in the OR and was deemed an increased aspiration risk. A CMAC 3 blade was used, but the picture on the screen was blank, and in adjusting the cable connection on the CMAC blade resulted in an inconsistent picture that would come in and out and constantly freeze, which made intubating with the equipment almost impossible. Additionally, there was significant bile-tinged secretions vs gastric contents in the oropharynx and hypopharynx seen with the various frames displayed on the unit. The CRNA intubating the patient was able to get the ETT tube into the trachea with a single frame picture and effectively a blind technique. The patient had desaturated to 88% before ETT placement verification and was quickly recruited to a sat of %. A 14Fr suction catheter was placed in the ETT and no secretions were able to be suctioned. The unit was marked with tape describing the problem and  anesthesia aides were informed.           Last vitals:  Vitals:    07/13/21 1000 07/13/21 1048 07/13/21 1100   BP:  98/47 90/61   Pulse: 110 107 109   Resp:  16 18   Temp:  36.8  C (98.2  F) 36.8  C (98.2  F)   SpO2:  94% 95%       Last vitals prior to Anesthesia Care Transfer:  CRNA VITALS  7/13/2021 1225 - 7/13/2021 1313      7/13/2021             Pulse:  103    SpO2:  97 %          Electronically Signed By: Severo Mejía MD  July 13, 2021  1:13 PM

## 2021-07-13 NOTE — OR NURSING
Assigned PACU care of patient.      Arrived in PACU  At 1300 from OR on cart accompanied by RN and CRNA.  Patient awake and alert, asking questions.    Per CRNA ordered 1 unit of PRBC, did not receive in OR, so not transfused.    Dr. Will at bedside.  Discussed blood transfusion with Dr. Will.   He would like the patient to still receive the unit of blood but it can wait until patient transferred to floor and settled in.

## 2021-07-13 NOTE — PROGRESS NOTES
CLINICAL NUTRITION SERVICES - BRIEF NOTE      Nutrition Prescription     RECOMMENDATIONS FOR MDs/PROVIDERS TO ORDER:  None at this time      Recommendations already ordered by Registered Dietitian (RD):  Ordered Po4 and magnesium as add on   --if K+>3, Mg>1.5 and Po4 >2, Start Vital 1.5 @ 10 ml/hr and advance by 10 mL q 8 hours. Hold advancement if labs not within parameters.    Vital 1.5 Leno @ goal of  65 ml/hr (1560ml/day)  will provide: 2340 kcals (33 kcal/kg), 105 g PRO (~1.5), 1191 ml free H20, 291 g CHO, and 9 g fiber daily, 89 g fat, 88 mEq K+, 1951 mg Po4  nephronex daily   30 mL water flush q 4 hours for tube patency   --Semi-elemental formula due to pancreatitis    Future/Additional Recommendations:  Renal Formula pending function: NovaSource Renal @ 50 mL/hr (1200 mL/day) to provide 2400 kcals (34 kcal/kg/day), 109 g PRO (~1.5 g/kg/day), 864 mL H2O, 220 CHO and 0 gm Fiber daily, 120 g fat, 29 mEq K+, 982 mg Po4.    Monitor tolerance to enteral nutrition, GI function, need for pancreatic enzymes if signs of steatorrhea .     *Please see full assessment note from 7/12/21    New Findings:  Patient had PEG-J placed, consult: Registered Dietitian to order TF per Medical Nutrition Therapy Guidelines  -Therapeutic paracentesis 7/13 (~1 L removed)     Labs: Na 131 (L), Creatinine 2.6 (L)   Po4 last checked 3.9 (7/7), Mg 2.3 (7/5), K+ 3.6 (7/13)     Interventions  Enteral Nutrition - Initiate    RD to follow per protocol.    Jennifer Castillo RD, LD  6B pager: [phone]

## 2021-07-13 NOTE — PROGRESS NOTES
[facility]    Progress Note - Daniela 3 Service        Date of Admission:  6/28/2021    Assessment & Plan   Dax Villareal is a 30 year old male with hx of alcohol use disorder admitted on 6/28/2021 after recent prolonged admission at [facility] for acute alcoholic hepatitis who has ESLD c/b HE, ARF requiring HD, and SBP in the setting of acute necrotizing pancreatitis. He arrived with acute respiratory failure and septic shock that have resolved, but he requires continued management for ARF, hepatic encephalopathy, SBP, nec panc and nutritional support.      Changes today:   - PEG-J placement today, received therapeutic paracentesis (1L) prior to procedure. Able to start using J-tube for feeds tonight.  - Will receive 1 unit pRBCs (Hgb drop 7.4 to 6.7 this morning) once back in room from PACU. Plan to recheck Hgb with 7/14 AM labs.    #Septic Shock, resolved    #Necrotizing pancreatitis w/ pseudocyst, improving  #SBP, improving  #Profound leukocytosis, improving  Etiology of the septic shock that he came in with likely due to necrotizing pancreatitis +/- SBP. Had percutaneous drain placed for drainage of pancreatic collection. Diagnostic para returned with SBP. Repeat CT 7/11 showed significant decrease in size of walled off necrosis and unchanged moderate ascites. S/p therapeutic paracentesis 7/13 (1L removed). Will continue meropenem for a couple days after PEG-J placement, plan to switch to cipro-flagyl later this week.  - Continue meropenem 500 mg q24h  - Daily CBC  - MAP goal > 65   - GI consulted, appreciate recs   - For back pain: Tylenol PRN and Dilaudid 1-2mg q4h PRN  - Will need cipro ppx for SBP on discharge     # Severe malnutrition in setting of chronic illness  PEG-J placed today. Procedure went well, patient able to start using J-tube for feeds this evening. Will keep G tube open/vented.  - Dietitian consult for new tube feeds, appreciate  recs  - Regular Diet, thin liquids per SLP  - Continue multivitamins  - Pantoprazole 40 mg IV daily  - Zofran PRN     # Decompensated EtOH cirrhosis c/b hepatic encephalopathy and recurrent ascites  # Hx of Alcohol use disorder c/b withdrawal   # Anemia  # Coagulopathy   Patient with a history of marked alcohol use and alcoholic hepatitis in the past. Was given a course of steroids (1 week) during previous admission at OSH. Patient mental status has been stable, alert and oriented x4 for almost a week, well managed on lactulose regimen. Hgb dropped from 7.4 to 6.7 this morning (etilogy likely chronic liver disease, chronic kidney disease, phlebotomy, dilution, poor nutrition, ect).  - 1 unit pRBCs today  - Lactulose 20g QID and PRN and rifaximin 550mg BID (titrate to 1.5L stool/day)  - Trend MELD labs  - Daily folic acid and thiamine  - Delirium precautions  - Melatonin 6mg at bedtime  - Avoid benzos and opioids as able  - Monitor hemoglobin  - Transfuse if hemoglobin < 7, platelet < 10 or < 30 with bleeding  - Continue to monitor for signs of bleeding     MELD-Na score: 34 at 7/13/2021  7:18 AM  MELD score: 33 at 7/13/2021  7:18 AM  Calculated from:  Serum Creatinine: 2.60 mg/dL at 7/13/2021  7:18 AM  Serum Sodium: 131 mmol/L at 7/13/2021  7:18 AM  Total Bilirubin: 13.9 mg/dL at 7/13/2021  7:18 AM  INR(ratio): 1.91 at 7/11/2021  6:07 AM  Age: 30 years     # ARF on HD (MWF)  # Hypervolemia causing acute respiratory failure,resolved  - Nephrology consulted, recs appreciated     > Hemodialysis MWF              - Midodrine 10 mg PRN before dialysis sessions     > Daily chemistry labs, strict I/O, and daily weights  - Aspiration precautions  - Supplemental oxygen as needed to keep sats above 89%     # Goals of Care  Family Care Conference was scheduled for 7/9/21, patient's sister overwhelmed thus was cancelled. Will plan to have a conference this week. Please see additional interval note for in depth details regarding  conversation on 7/9 about goals of care. Patient's long term prognosis is still quite poor given his multi organ failure.   - Palliative care consult, recs appreciated      Diet: Snacks/Supplements Adult: Ensure Clear; With Meals    DVT Prophylaxis: Pneumatic Compression Devices  Rdz Catheter: Not present  Fluids: 5 mL NS TKO  Central Lines: PRESENT  PICC Triple Lumen 06/28/21 Left-Site Assessment: WDL except  CVC Double Lumen Right-Site Assessment: WDL  Code Status: Full Code      Disposition Plan   Expected discharge: >7 days, recommended to transitional care unit once mental status at baseline, renal function and nutrition improved and necrotizing pancreatitis is well managed..     The patient's care was discussed with the Attending Physician, Dr. Baker.    Jaime Amin, MS3  Medical Student  [address]  Securely message with the Vocera Web Console (learn more here)  Text page via [employer] Paging/Directory  Please see sign in/sign out for up to date coverage information      Resident/Fellow Attestation   I, Rosa Elena Shore, was present with the medical/NAHUM student who participated in the service and in the documentation of the note.  I have verified the history and personally performed the physical exam and medical decision making.  I agree with the assessment and plan of care as documented in the note.      Rosa Elena Shore MD  PGY2  Date of Service (when I saw the patient): 07/13/21  ______________________________________________________________________    Interval History   Overnight, staff reported bloody fluid output into perc drain. On exam this morning, perc drain output appears similar to how it has been; maroon-brown colored fluid, no irritation or pain at perc drain insertion site.     Back pain due to perc drain adequately managed on Dilaudid q4h. Unchanged persistent nausea but no vomiting.    Data reviewed today: I reviewed all  medications, new labs and imaging results over the last 24 hours.     Physical Exam   Vital Signs: Temp: 98.2  F (36.8  C) Temp src: Skin BP: 124/57 Pulse: 104   Resp: 18 SpO2: 95 % O2 Device: Nasal cannula Oxygen Delivery: 2 LPM  Weight: 152 lbs 1.88 oz    Gen: Lying down in bed, sleeping comfortably. Awakens easily and is alert, similar mental status as the past several days.  HEENT: Conjunctival icterus  CV: Tachycardic. Regular rhythm.   Resp: Breathing comfortably on RA  Abd: Soft, distended, epigastric area tender to deeper palpation  Skin: Jaundiced. Itchy, dry skin on lower back.  Back: Left flank RP drain with maroon/brown OP, no tenderness around tube insertion site  Ext: Well perfused, no edema  Neuro: Alert and oriented x4. Able to communicate needs well.    Data   Recent Labs   Lab 07/13/21  1050 07/13/21  0718 07/12/21  2105 07/11/21  0607   WBC  --  21.3* 24.6* 25.8*   HGB  --  6.7* 7.4* 7.5*   MCV  --  97 96 97   PLT  --  211 214 204   INR  --  1.78* 1.83* 1.91*   NA  --  131* 130* 126*   POTASSIUM  --  3.6 3.7 4.6   CHLORIDE  --  98 97 96   CO2  --  23 21 20   BUN  --  29 21 41*   CR  --  2.60* 2.06* 3.20*   ANIONGAP  --  10 12 10   JANENE  --  8.5 8.6 8.3*   * 94 91 95   ALBUMIN  --  2.6* 2.7* 2.8*   PROTTOTAL  --  6.9 7.1 7.5   BILITOTAL  --  13.9* 14.6* 17.3*   ALKPHOS  --  167* 169* 177*   ALT  --  15 17 21   AST  --  60* 60* 79*     Recent Results (from the past 24 hour(s))   XR Surgery CESIA Fluoro L/T 5 Min w Stills    Narrative    This exam was marked as non-reportable because it will not be read by a   radiologist or a [place] non-radiologist provider.

## 2021-07-14 ENCOUNTER — APPOINTMENT (OUTPATIENT)
Dept: INTERVENTIONAL RADIOLOGY/VASCULAR | Facility: CLINIC | Age: 31
End: 2021-07-14
Attending: NURSE PRACTITIONER
Payer: MEDICAID

## 2021-07-14 LAB
ALBUMIN SERPL-MCNC: 2.6 G/DL (ref 3.4–5)
ALP SERPL-CCNC: 174 U/L (ref 40–150)
ALT SERPL W P-5'-P-CCNC: 12 U/L (ref 0–70)
ANION GAP SERPL CALCULATED.3IONS-SCNC: 12 MMOL/L (ref 3–14)
APPEARANCE FLD: ABNORMAL
AST SERPL W P-5'-P-CCNC: 54 U/L (ref 0–45)
BILIRUB SERPL-MCNC: 11.1 MG/DL (ref 0.2–1.3)
BLD PROD TYP BPU: NORMAL
BLOOD COMPONENT TYPE: NORMAL
BUN SERPL-MCNC: 42 MG/DL (ref 7–30)
CALCIUM SERPL-MCNC: 8.6 MG/DL (ref 8.5–10.1)
CHLORIDE BLD-SCNC: 98 MMOL/L (ref 94–109)
CO2 SERPL-SCNC: 20 MMOL/L (ref 20–32)
CODING SYSTEM: NORMAL
COLOR FLD: ABNORMAL
CREAT SERPL-MCNC: 3.35 MG/DL (ref 0.66–1.25)
CROSSMATCH: NORMAL
EOSINOPHIL NFR FLD MANUAL: 3 %
ERYTHROCYTE [DISTWIDTH] IN BLOOD BY AUTOMATED COUNT: 20.9 % (ref 10–15)
GFR SERPL CREATININE-BSD FRML MDRD: 23 ML/MIN/1.73M2
GLUCOSE BLD-MCNC: 137 MG/DL (ref 70–99)
HCT VFR BLD AUTO: 20.7 % (ref 40–53)
HGB BLD-MCNC: 6.9 G/DL (ref 13.3–17.7)
HGB BLD-MCNC: 6.9 G/DL (ref 13.3–17.7)
HGB BLD-MCNC: 7.9 G/DL (ref 13.3–17.7)
INR PPP: 1.75 (ref 0.85–1.15)
ISSUE DATE AND TIME: NORMAL
ISSUE DATE AND TIME: NORMAL
LACTATE SERPL-SCNC: 2.7 MMOL/L (ref 0.7–2)
LYMPHOCYTES NFR FLD MANUAL: 61 %
MAGNESIUM SERPL-MCNC: 1.5 MG/DL (ref 1.6–2.3)
MAGNESIUM SERPL-MCNC: 1.6 MG/DL (ref 1.6–2.3)
MCH RBC QN AUTO: 32.1 PG (ref 26.5–33)
MCHC RBC AUTO-ENTMCNC: 33.3 G/DL (ref 31.5–36.5)
MCV RBC AUTO: 96 FL (ref 78–100)
MONOS+MACROS NFR FLD MANUAL: 17 %
NEUTS BAND NFR FLD MANUAL: 20 %
PHOSPHATE SERPL-MCNC: 3.4 MG/DL (ref 2.5–4.5)
PHOSPHATE SERPL-MCNC: 4.3 MG/DL (ref 2.5–4.5)
PHOSPHATE SERPL-MCNC: 4.6 MG/DL (ref 2.5–4.5)
PLATELET # BLD AUTO: 213 10E3/UL (ref 150–450)
POTASSIUM BLD-SCNC: 4.2 MMOL/L (ref 3.4–5.3)
PROT SERPL-MCNC: 7.4 G/DL (ref 6.8–8.8)
RBC # BLD AUTO: 2.15 10E6/UL (ref 4.4–5.9)
SODIUM SERPL-SCNC: 130 MMOL/L (ref 133–144)
UNIT ABO/RH: NORMAL
UNIT NUMBER: NORMAL
UNIT STATUS: NORMAL
UNIT TYPE ISBT: 5100
UNIT TYPE ISBT: 5100
UNIT TYPE ISBT: 7300
WBC # BLD AUTO: 17.7 10E3/UL (ref 4–11)
WBC # FLD AUTO: 487 /UL

## 2021-07-14 PROCEDURE — 36592 COLLECT BLOOD FROM PICC: CPT | Performed by: INTERNAL MEDICINE

## 2021-07-14 PROCEDURE — 99152 MOD SED SAME PHYS/QHP 5/>YRS: CPT

## 2021-07-14 PROCEDURE — 86923 COMPATIBILITY TEST ELECTRIC: CPT | Performed by: INTERNAL MEDICINE

## 2021-07-14 PROCEDURE — 85027 COMPLETE CBC AUTOMATED: CPT | Performed by: INTERNAL MEDICINE

## 2021-07-14 PROCEDURE — 99153 MOD SED SAME PHYS/QHP EA: CPT

## 2021-07-14 PROCEDURE — 85018 HEMOGLOBIN: CPT | Performed by: INTERNAL MEDICINE

## 2021-07-14 PROCEDURE — 99233 SBSQ HOSP IP/OBS HIGH 50: CPT | Performed by: PHYSICIAN ASSISTANT

## 2021-07-14 PROCEDURE — 87070 CULTURE OTHR SPECIMN AEROBIC: CPT | Performed by: STUDENT IN AN ORGANIZED HEALTH CARE EDUCATION/TRAINING PROGRAM

## 2021-07-14 PROCEDURE — 90937 HEMODIALYSIS REPEATED EVAL: CPT

## 2021-07-14 PROCEDURE — 85610 PROTHROMBIN TIME: CPT | Performed by: INTERNAL MEDICINE

## 2021-07-14 PROCEDURE — 49423 EXCHANGE DRAINAGE CATHETER: CPT | Performed by: PHYSICIAN ASSISTANT

## 2021-07-14 PROCEDURE — 99233 SBSQ HOSP IP/OBS HIGH 50: CPT | Mod: GC | Performed by: INTERNAL MEDICINE

## 2021-07-14 PROCEDURE — 255N000002 HC RX 255 OP 636: Performed by: INTERNAL MEDICINE

## 2021-07-14 PROCEDURE — 80053 COMPREHEN METABOLIC PANEL: CPT | Performed by: INTERNAL MEDICINE

## 2021-07-14 PROCEDURE — 83735 ASSAY OF MAGNESIUM: CPT | Performed by: INTERNAL MEDICINE

## 2021-07-14 PROCEDURE — 250N000013 HC RX MED GY IP 250 OP 250 PS 637: Performed by: INTERNAL MEDICINE

## 2021-07-14 PROCEDURE — 76080 X-RAY EXAM OF FISTULA: CPT | Mod: 26 | Performed by: PHYSICIAN ASSISTANT

## 2021-07-14 PROCEDURE — 250N000011 HC RX IP 250 OP 636: Performed by: INTERNAL MEDICINE

## 2021-07-14 PROCEDURE — 87075 CULTR BACTERIA EXCEPT BLOOD: CPT | Performed by: STUDENT IN AN ORGANIZED HEALTH CARE EDUCATION/TRAINING PROGRAM

## 2021-07-14 PROCEDURE — 99223 1ST HOSP IP/OBS HIGH 75: CPT | Performed by: INTERNAL MEDICINE

## 2021-07-14 PROCEDURE — P9047 ALBUMIN (HUMAN), 25%, 50ML: HCPCS | Performed by: NURSE PRACTITIONER

## 2021-07-14 PROCEDURE — 250N000013 HC RX MED GY IP 250 OP 250 PS 637: Performed by: STUDENT IN AN ORGANIZED HEALTH CARE EDUCATION/TRAINING PROGRAM

## 2021-07-14 PROCEDURE — 250N000011 HC RX IP 250 OP 636: Performed by: NURSE PRACTITIONER

## 2021-07-14 PROCEDURE — P9016 RBC LEUKOCYTES REDUCED: HCPCS | Performed by: INTERNAL MEDICINE

## 2021-07-14 PROCEDURE — 250N000009 HC RX 250: Performed by: PHYSICIAN ASSISTANT

## 2021-07-14 PROCEDURE — 85041 AUTOMATED RBC COUNT: CPT | Performed by: INTERNAL MEDICINE

## 2021-07-14 PROCEDURE — 49424 ASSESS CYST CONTRAST INJECT: CPT | Mod: 51 | Performed by: PHYSICIAN ASSISTANT

## 2021-07-14 PROCEDURE — 258N000003 HC RX IP 258 OP 636: Performed by: CLINICAL NURSE SPECIALIST

## 2021-07-14 PROCEDURE — C1769 GUIDE WIRE: HCPCS

## 2021-07-14 PROCEDURE — 250N000009 HC RX 250: Performed by: INTERNAL MEDICINE

## 2021-07-14 PROCEDURE — 0D2WX0Z CHANGE DRAINAGE DEVICE IN PERITONEUM, EXTERNAL APPROACH: ICD-10-PCS | Performed by: PHYSICIAN ASSISTANT

## 2021-07-14 PROCEDURE — 250N000011 HC RX IP 250 OP 636: Performed by: PHYSICIAN ASSISTANT

## 2021-07-14 PROCEDURE — 84100 ASSAY OF PHOSPHORUS: CPT | Performed by: STUDENT IN AN ORGANIZED HEALTH CARE EDUCATION/TRAINING PROGRAM

## 2021-07-14 PROCEDURE — 120N000003 HC R&B IMCU UMMC

## 2021-07-14 PROCEDURE — 99152 MOD SED SAME PHYS/QHP 5/>YRS: CPT | Performed by: PHYSICIAN ASSISTANT

## 2021-07-14 PROCEDURE — C1729 CATH, DRAINAGE: HCPCS

## 2021-07-14 PROCEDURE — 75984 XRAY CONTROL CATHETER CHANGE: CPT | Mod: 26 | Performed by: PHYSICIAN ASSISTANT

## 2021-07-14 PROCEDURE — 83605 ASSAY OF LACTIC ACID: CPT | Performed by: INTERNAL MEDICINE

## 2021-07-14 PROCEDURE — 85018 HEMOGLOBIN: CPT

## 2021-07-14 PROCEDURE — 49423 EXCHANGE DRAINAGE CATHETER: CPT

## 2021-07-14 RX ORDER — NALOXONE HYDROCHLORIDE 0.4 MG/ML
0.4 INJECTION, SOLUTION INTRAMUSCULAR; INTRAVENOUS; SUBCUTANEOUS
Status: DISCONTINUED | OUTPATIENT
Start: 2021-07-14 | End: 2021-07-14 | Stop reason: HOSPADM

## 2021-07-14 RX ORDER — IODIXANOL 320 MG/ML
50 INJECTION, SOLUTION INTRAVASCULAR ONCE
Status: COMPLETED | OUTPATIENT
Start: 2021-07-14 | End: 2021-07-14

## 2021-07-14 RX ORDER — FENTANYL CITRATE 50 UG/ML
25-50 INJECTION, SOLUTION INTRAMUSCULAR; INTRAVENOUS EVERY 5 MIN PRN
Status: DISCONTINUED | OUTPATIENT
Start: 2021-07-14 | End: 2021-07-14 | Stop reason: HOSPADM

## 2021-07-14 RX ORDER — ALBUMIN (HUMAN) 12.5 G/50ML
12.5 SOLUTION INTRAVENOUS ONCE
Status: COMPLETED | OUTPATIENT
Start: 2021-07-14 | End: 2021-07-14

## 2021-07-14 RX ORDER — NALOXONE HYDROCHLORIDE 0.4 MG/ML
0.2 INJECTION, SOLUTION INTRAMUSCULAR; INTRAVENOUS; SUBCUTANEOUS
Status: DISCONTINUED | OUTPATIENT
Start: 2021-07-14 | End: 2021-07-14 | Stop reason: HOSPADM

## 2021-07-14 RX ORDER — FLUMAZENIL 0.1 MG/ML
0.2 INJECTION, SOLUTION INTRAVENOUS
Status: DISCONTINUED | OUTPATIENT
Start: 2021-07-14 | End: 2021-07-14 | Stop reason: HOSPADM

## 2021-07-14 RX ADMIN — ONDANSETRON 4 MG: 2 INJECTION INTRAMUSCULAR; INTRAVENOUS at 11:13

## 2021-07-14 RX ADMIN — MIDAZOLAM 1 MG: 1 INJECTION INTRAMUSCULAR; INTRAVENOUS at 14:04

## 2021-07-14 RX ADMIN — SODIUM CHLORIDE 250 ML: 9 INJECTION, SOLUTION INTRAVENOUS at 10:04

## 2021-07-14 RX ADMIN — THIAMINE HCL TAB 100 MG 100 MG: 100 TAB at 09:32

## 2021-07-14 RX ADMIN — Medication 550 MG: at 20:59

## 2021-07-14 RX ADMIN — HYDROMORPHONE HYDROCHLORIDE 2 MG: 1 SOLUTION ORAL at 01:48

## 2021-07-14 RX ADMIN — LIDOCAINE HYDROCHLORIDE 2 ML: 10 INJECTION, SOLUTION EPIDURAL; INFILTRATION; INTRACAUDAL; PERINEURAL at 14:46

## 2021-07-14 RX ADMIN — POTASSIUM CHLORIDE 20 MEQ: 1.5 POWDER, FOR SOLUTION ORAL at 20:59

## 2021-07-14 RX ADMIN — PROCHLORPERAZINE EDISYLATE 5 MG: 5 INJECTION INTRAMUSCULAR; INTRAVENOUS at 07:35

## 2021-07-14 RX ADMIN — LACTULOSE 20 G: 10 SOLUTION ORAL; RECTAL at 21:01

## 2021-07-14 RX ADMIN — HYDROMORPHONE HYDROCHLORIDE 2 MG: 1 SOLUTION ORAL at 08:42

## 2021-07-14 RX ADMIN — LACTULOSE 20 G: 10 SOLUTION ORAL; RECTAL at 15:20

## 2021-07-14 RX ADMIN — FENTANYL CITRATE 50 MCG: 50 INJECTION, SOLUTION INTRAMUSCULAR; INTRAVENOUS at 14:05

## 2021-07-14 RX ADMIN — ONDANSETRON 4 MG: 2 INJECTION INTRAMUSCULAR; INTRAVENOUS at 01:57

## 2021-07-14 RX ADMIN — HYDROMORPHONE HYDROCHLORIDE 2 MG: 1 SOLUTION ORAL at 19:02

## 2021-07-14 RX ADMIN — POTASSIUM CHLORIDE 20 MEQ: 1.5 POWDER, FOR SOLUTION ORAL at 09:33

## 2021-07-14 RX ADMIN — Medication 550 MG: at 09:34

## 2021-07-14 RX ADMIN — IODIXANOL 15 ML: 320 INJECTION, SOLUTION INTRAVASCULAR at 14:35

## 2021-07-14 RX ADMIN — MEROPENEM 500 MG: 500 INJECTION, POWDER, FOR SOLUTION INTRAVENOUS at 20:59

## 2021-07-14 RX ADMIN — LACTULOSE 20 G: 10 SOLUTION ORAL; RECTAL at 09:33

## 2021-07-14 RX ADMIN — SODIUM CHLORIDE 300 ML: 9 INJECTION, SOLUTION INTRAVENOUS at 10:04

## 2021-07-14 RX ADMIN — Medication 5 ML: at 09:33

## 2021-07-14 RX ADMIN — FOLIC ACID 1 MG: 1 TABLET ORAL at 09:32

## 2021-07-14 RX ADMIN — ALBUMIN HUMAN 12.5 G: 0.25 SOLUTION INTRAVENOUS at 20:59

## 2021-07-14 RX ADMIN — Medication 40 MG: at 09:33

## 2021-07-14 RX ADMIN — Medication: at 11:17

## 2021-07-14 RX ADMIN — MIDODRINE HYDROCHLORIDE 10 MG: 5 TABLET ORAL at 09:32

## 2021-07-14 ASSESSMENT — ACTIVITIES OF DAILY LIVING (ADL)
ADLS_ACUITY_SCORE: 18

## 2021-07-14 ASSESSMENT — MIFFLIN-ST. JEOR: SCORE: 1579.75

## 2021-07-14 NOTE — PROGRESS NOTES
CLINICAL NUTRITION SERVICES     Nutrition Prescription    RECOMMENDATIONS FOR MDs/PROVIDERS TO ORDER:  Recommend pt tolerate continuous TF regimen at goal 65 ml/hr x 24 hours with K+ > 3, Mg++ > 1.5 and Phos > 1.9 prior to cycling TF regimen     Recommendations already ordered by Registered Dietitian (RD):  Additional Mg and Phos lab add on for tomorrow while advancing to TF goal with refeeding risk     Future/Additional Recommendations:  If cycled TF regimen to be initiated, recommend:  - Vital 1.5 at goal 130 ml/hr x 12 hrs overnight (8 pm - 8 am) to provide 1560 ml/day, 2340 kcals (33 kcal/kg), 105 g PRO (~1.5 g/kg), 1191 ml free H20, 291 g CHO, and 9 g fiber daily, 89 g fat, 88 mEq K+, 1951 mg Phos  - Recommend first cyclic feeding run at  ml/hr x 12 hrs. If tolerated and lytes acceptable, could increase to goal for following cyclic feeding.     If Renal formula indicated, recommend:  - Novasource Renal at goal 100 ml/hr x 12 hrs overnight (8 pm - 8 am) to provide 1200 ml/day, 2400 kcals (34 kcal/kg), 109 g PRO (~1.5 g/kg), 864 mL H2O, 220 CHO and 0 gm Fiber daily, 120 g fat, 29 mEq K+, 982 mg Phos   See full assessment note from 7/12/21    Chart reviewed. Spoke with med student, Jaime, via phone this afternoon who inquired about cycling pt's TF overnight to allow for time off of the pump during the day. Discussed RD recommendations that once pt reaches goal continuous TF volume and tolerating well with lytes in acceptable parameters, cycling regimen could be considered. TF currently off right now for NPO status with drain exchange this afternoon, previously running at 20 ml/hr at 0800 this AM with goal of 65 ml/hr.     Labs: K+ 4.2 (wnl), Mg 1.6 (wnl) and Phos 4.6 (H).     INTERVENTIONS:  Implementation:  Collaboration with other providers    Follow up/Monitoring:  RD will continue to monitor and follow per protocol.     Adriana Nichole RD, LD  6B pager: [phone]

## 2021-07-14 NOTE — CONSULTS
"    Interventional Radiology Consult Service Note    Patient is on IR schedule 7/14 for a left RP drain exchange.   Labs WNL for procedure. COVID neg.   Orders for NPO have been entered. Pt currently on IV antibiotics.  Consent will be done prior to procedure.     Please contact the IR [place] RN [place] [phone] for estimated time of procedure.     Case discussed with Dr. Diaz from IR and Medical student- Jaime Amin. This is a 30 year old male with hx of alcohol use disorder admitted on 6/28/2021 after recent prolonged admission at [facility] for acute alcoholic hepatitis who has ESLD c/b HE, ARF requiring HD, and SBP in the setting of acute necrotizing pancreatitis. He arrived with acute respiratory failure and septic shock that have resolved, but he requires continued management for ARF, hepatic encephalopathy, SBP, nec panc and nutritional support. IR placed a left RP drain 6/30. We were contact the evening of 7/13 with reports of leakage from the drain at the connecter to the drainage bag. Bedside evaluation of the drain reveal there is a hole in the tubing proximal to the hub. Drain exchange will be necessary to manage leakage.    Expected date of discharge: TBD    Vitals:   /58 (BP Location: Right arm)   Pulse 110   Temp 97.6  F (36.4  C) (Axillary)   Resp 20   Ht 1.676 m (5' 6\")   Wt 68.2 kg (150 lb 5.7 oz)   SpO2 96%   BMI 24.27 kg/m      Pertinent Labs:     Lab Results   Component Value Date    WBC 17.7 (H) 07/14/2021    WBC 21.3 (H) 07/13/2021    WBC 24.6 (H) 07/12/2021    WBC 25.8 (H) 07/11/2021    WBC 23.6 (H) 07/10/2021    WBC 24.7 (H) 07/09/2021       Lab Results   Component Value Date    HGB 6.9 07/14/2021    HGB 6.9 07/14/2021    HGB 6.7 07/13/2021    HGB 7.5 07/11/2021    HGB 7.2 07/10/2021    HGB 6.4 07/10/2021       Lab Results   Component Value Date     07/14/2021     07/13/2021     07/12/2021     07/11/2021     07/10/2021     07/09/2021 "       Lab Results   Component Value Date    INR 1.75 (H) 07/14/2021    INR 1.91 (H) 07/11/2021    PTT 97 (H) 06/28/2021       Lab Results   Component Value Date    POTASSIUM 4.2 07/14/2021    POTASSIUM 4.6 07/11/2021        SOFIA Zhang CNP  Interventional Radiology  Pager: [phone]

## 2021-07-14 NOTE — PROGRESS NOTES
HEMODIALYSIS TREATMENT NOTE    Date: 7/14/2021  Time: 5:31 PM    Data:  Pre Wt: 68.2 kg (150 lb 5.7 oz)   Desired Wt: 66.2 kg   Ultrafiltration - Post Run Net Total Removed (mL): 1000 mL  Vascular Access Status: CVC  patent  Dialyzer Rinse: Streaked, Heavy (venous chamber clotted 50%)  Total Blood Volume Processed: 65.4 L   Total Dialysis (Treatment) Time: 2 hours and 52 min   Dialysate Bath: K 3, Ca 2.25  Heparin: None    Lab:   No    Interventions/Assessment:  Patient arrived in dialysis for HD and fluid removal 1-2 Liters. Patient received midodrine prior to HD per floor RN. HD via CVC which was patent and flushed well.  Patient ran on K3 bath and blood transfusion administered during his HD run by dialysis RN. Blood transfusion administered with no problem and patient tolerated well.   Patient reported that he was not aware of his IR procedure for drain replacement after dialysis. NP called to room to review procedure and concerns priior to patient dialysis completion.  Patient verbalized understanding of drain procedure and transport arrived to transfer patient to IR.   Venous chamber clotted with 8 minutes left in HD treatment. HD stopped and NP notified.      Plan:    HD per Renal Team

## 2021-07-14 NOTE — PROGRESS NOTES
[facility]    Progress Note - Daniela 3 Service        Date of Admission:  6/28/2021    Assessment & Plan   Dax Villareal is a 30 year old male with hx of alcohol use disorder admitted on 6/28/2021 after recent prolonged admission at [facility] for acute alcoholic hepatitis who has ESLD c/b HE, ARF requiring HD, and SBP and malnutrition in the setting of acute necrotizing pancreatitis, s/p PEG-J placement on 7/13. He arrived with acute respiratory failure and septic shock that have resolved, and his SBP has been appropriately treated. He requires continued management for ARF, hepatic encephalopathy, infected pancreatic pseudocyst, and nutritional support via PEG-J.      Changes today:   - Advancing Tube Feeds  - Left RP drain replaced by IR   - Hgb at 6.9; receive 1 unit pRBCs in dialysis  - ID consult --> Will continue Meropenem for total 21 days  - Care conference at 2 pm on 7/15 with sister, primary team, and possibly GI (have been paged to notify of conference)     #Septic Shock, resolved    #Necrotizing pancreatitis w/ infected pseudocyst, improving  #SBP, improving  #Leukocytosis, improving  #Perc drain leak  Etiology of the septic shock that he came in with likely due to necrotizing pancreatitis, infected pseudocyst, and SBP. Had percutaneous RP drain placed for drainage of pancreatic collection. Diagnostic para returned with SBP. Repeat CT 7/11 showed significant decrease in size of walled off necrosis and unchanged moderate ascites. S/p therapeutic paracentesis 7/13 (1L removed). Drain replaced today 2/2 leak in the tube.  - Continue meropenem 500 mg q24h (Total 21 day course - Through 7/19).  - Daily CBC  - MAP goal > 65   - GI consulted, appreciate recs   - For back pain: Tylenol PRN and Dilaudid 1-2mg q4h PRN  - Will need cipro ppx for SBP on discharge     # Severe malnutrition in setting of chronic illness  PEG-J placed 7/13. Procedure went well,  patient started on TF 20 mL/hr last night, going up every hour to reach goal of 65 mL/hr. FWF 30 mL q4h. Will keep G tube open/vented.  - Dietitian consult for new tube feeds, appreciate recs   - TF 65 mL/hr, FWF 30 mL q4h  - Regular Diet, thin liquids per SLP  - Continue multivitamins  - Pantoprazole 40 mg IV daily  - Zofran PRN     # Decompensated EtOH cirrhosis c/b hepatic encephalopathy and recurrent ascites  # Hx of Alcohol use disorder c/b withdrawal   # Anemia  # Coagulopathy   Patient with a history of marked alcohol use and alcoholic hepatitis in the past. Was given a course of steroids (1 week) during previous admission at OSH. Patient mental status has been stable, alert and oriented x4 for a week, well managed on lactulose regimen. Hgb stable at 6.9 after pRBC infusion following drop to 6.4 (etilogy likely chronic liver disease, chronic kidney disease, phlebotomy, dilution, poor nutrition, ect). Patient to receive one more unit pRBCs today in dialysis.  - 1 unit pRBCs today  - Lactulose 20g QID and PRN and rifaximin 550mg BID (titrate to 1.5L stool/day)  - Trend MELD labs  - Daily folic acid and thiamine  - Delirium precautions  - Melatonin 6mg at bedtime  - Avoid benzos and opioids as able  - Monitor hemoglobin  - Transfuse if hemoglobin < 7, platelet < 10 or < 30 with bleeding  - Continue to monitor for signs of bleeding    MELD-Na score: 34 at 7/13/2021  7:18 AM  MELD score: 33 at 7/13/2021  7:18 AM  Calculated from:  Serum Creatinine: 2.60 mg/dL at 7/13/2021  7:18 AM  Serum Sodium: 131 mmol/L at 7/13/2021  7:18 AM  Total Bilirubin: 13.9 mg/dL at 7/13/2021  7:18 AM  INR(ratio): 1.91 at 7/11/2021  6:07 AM  Age: 30 years     # ARF on HD (MWF)  # Hypervolemia causing acute respiratory failure,resolved  - Nephrology consulted, recs appreciated     > Hemodialysis MWF              - Midodrine 10 mg PRN before dialysis sessions     > Daily chemistry labs, strict I/O, and daily weights  - Aspiration  precautions  - Supplemental oxygen as needed to keep sats above 89%     # Goals of Care  Family Care Conference was scheduled for 7/9/21, patient's sister overwhelmed thus was cancelled. Will plan to have a conference tomorrow, 7/15 at 2 pm with patient, sister, primary team and possibly GI. Please see additional interval note for in depth details regarding conversation on 7/9 about goals of care. Patient's long term prognosis is still quite poor given his multi organ failure. Planning for goals of care conference on 7/15 at 2pm with GI and sister.       Diet: Snacks/Supplements Adult: Ensure Clear; With Meals  Adult Formula Drip Feeding: Continuous Vital 1.5; Jejunostomy; Goal Rate: 65; mL/hr; Medication - Feeding Tube Flush Frequency: At least 15-30 mL water before and after medication administration and with tube clogging; Amount to Send (Nutrition us...  2 Gram Sodium Diet    DVT Prophylaxis: Pneumatic Compression Devices  Rdz Catheter: Not present  Fluids: 5 mL NS TKO  Central Lines: PRESENT  PICC Triple Lumen 06/28/21 Left-Site Assessment: WDL  CVC Double Lumen Right-Site Assessment: WDL  Code Status: Full Code      Disposition Plan   Expected discharge: >7 days, recommended to transitional care unit once mental status at baseline, renal function and nutrition improved and necrotizing pancreatitis is well managed.     The patient's care was discussed with the Attending Physician, Dr. Baker.    Jaime Amin, MS3  Medical Student  [place] [id number] Service    Resident/Fellow Attestation   I, Rosa Elena Shore, was present with the medical/NAHUM student who participated in the service and in the documentation of the note.  I have verified the history and personally performed the physical exam and medical decision making.  I agree with the assessment and plan of care as documented in the note.      Rosa Elena Shore MD  PGY2  ______________________________________________________________________    Interval History   Patient  c/o nausea all night, received Zofran x1. Dilaudid 2mg x3 overnight for back pain 2/2 perc drain.     Not feeling well this morning due to persistent nausea and abdominal pain (dennis around PEG-J site), but still has an appetite and has not vomited.     Data reviewed today: I reviewed all medications, new labs and imaging results over the last 24 hours.    Physical Exam   Vital Signs: Temp: 97.6  F (36.4  C) Temp src: Oral BP: 113/68 Pulse: 96   Resp: 14 SpO2: 97 % O2 Device: None (Room air) Oxygen Delivery: 2 LPM  Weight: 150 lbs 5.66 oz    Gen: Lying down in bed, awake. Appears uncomfortable, similar mental status as the past several days.  HEENT: Conjunctival icterus  CV: Tachycardic. Regular rhythm.   Resp: Breathing comfortably on RA  Abd: Soft, distended, epigastric area tender to palpation. PEG-J insertion site looks clean, non-erythematous.   Skin: Jaundiced. Itchy, dry skin on lower back.  Back: Left flank RP drain with maroon/brown OP, tube intact to bag (taped). No tenderness around tube insertion site  Ext: Well perfused, no edema  Neuro: Alert and oriented x4. Able to communicate needs well.    Data   Recent Labs   Lab 07/14/21  0345 07/14/21  0343 07/14/21  0342 07/13/21  1050 07/13/21  0718 07/12/21  2105   WBC  --   --  17.7*  --  21.3* 24.6*   HGB  --   --  6.9*  6.9*  --  6.7* 7.4*   MCV  --   --  96  --  97 96   PLT  --   --  213  --  211 214   INR  --  1.75*  --   --  1.78* 1.83*   *  --   --   --  131* 130*   POTASSIUM 4.2  --   --   --  3.6 3.7   CHLORIDE 98  --   --   --  98 97   CO2 20  --   --   --  23 21   BUN 42*  --   --   --  29 21   CR 3.35*  --   --   --  2.60* 2.06*   ANIONGAP 12  --   --   --  10 12   JANENE 8.6  --   --   --  8.5 8.6   *  --   --  101* 94 91   ALBUMIN 2.6*  --   --   --  2.6* 2.7*   PROTTOTAL 7.4  --   --   --  6.9 7.1   BILITOTAL 11.1*  --   --   --  13.9* 14.6*   ALKPHOS 174*  --   --   --  167* 169*   ALT 12  --   --   --  15 17   AST 54*  --   --   --   60* 60*     Recent Results (from the past 24 hour(s))   IR Abscess Tube Change    Narrative    Dax Villareal  [id number]    Completed sinogram through existing drain and over-the-wire exchange  for same. Patient with history of necrotizing pancreatitis and  external drain damage, drain exchange requested.  Completed injection  demonstrates pigtail in large cavity. Dark brown fluid aspirated and  sample collected for lab studies ordered. Completed over-the-wire  exchange for 24 [place] Thal-Quick drain. Repeat sinogram shows drain  centered in fluid collection. Resume prior drain cares and flushes.  Zohar.  <2    Sedation medications administered: 1 mg versed, 50 mcg fentanyl  Total sedation time: 30 min

## 2021-07-14 NOTE — PROCEDURES
[facility]    Procedure: IR Procedure Note    Date/Time: 7/14/2021 2:48 PM  Performed by: Ramez العلي PA-C  Authorized by: Ramez العلي PA-C     UNIVERSAL PROTOCOL   Site Marked: NA  Prior Images Obtained and Reviewed:  Yes  Required items: Required blood products, implants, devices and special equipment available    Patient identity confirmed:  Verbally with patient, arm band, provided demographic data and hospital-assigned identification number  Patient was reevaluated immediately before administering moderate or deep sedation or anesthesia  Confirmation Checklist:  Patient's identity using two indicators, relevant allergies, procedure was appropriate and matched the consent or emergent situation and correct equipment/implants were available  Time out: Immediately prior to the procedure a time out was called    Universal Protocol: the Joint Commission Universal Protocol was followed    Preparation: Patient was prepped and draped in usual sterile fashion           ANESTHESIA    Anesthesia: Local infiltration  Local Anesthetic:  Lidocaine 1% without epinephrine      SEDATION    Patient Sedated: Yes    Vital signs: Vital signs monitored during sedation    See dictated procedure note for full details.  Findings: Sedation medications administered: 1 mg versed, 50 mcg fentanyl  Total sedation time: 30 min     Specimens: fluid and/or tissue for laboratory analysis and culture    Complications: None    Condition: Stable    PROCEDURE   Patient Tolerance:  Patient tolerated the procedure well with no immediate complications  Describe Procedure: Dax Villareal  [id number]    Completed sinogram through existing drain and over-the-wire exchange for same. Patient with history of necrotizing pancreatitis and external drain damage, drain exchange requested.  Completed injection demonstrates pigtail in large cavity. Dark brown fluid aspirated and sample  collected for lab studies ordered. Completed over-the-wire exchange for 24 [place] Thal-Quick drain. Repeat sinogram shows drain centered in fluid collection. Resume prior drain cares and flushes. Zohra.  <2    Sedation medications administered: 1 mg versed, 50 mcg fentanyl  Total sedation time: 30 min   Length of time physician/provider present for 1:1 monitoring during sedation: 30

## 2021-07-14 NOTE — CONSULTS
[facility] CONSULT NOTE     Patient:  Dax Villareal   Date of birth 1990, Medical record number [id number]  Date of Visit:  07/14/2021  Date of Admission: 6/28/2021  Consult Requester:Patricia Baker, *          Assessment and Recommendations:   RECOMMENDATION:  1. Reasonable to continue coverage on antibiotics (meropenem or cipro/flagyl per GI) for the next 4-5 days post replacement of perc drain then would discontinue 7/18. This will give him a total of 21 days of broad spectrum antibiotics post initial perc drain placement for necrotizing pancreatitis with pseudocyst. Cultures remain largely negative.   2. Would draw BCx if patient spikes fever.   3. Cultures sent from replaced perc drain from 7/14 are not considered sterile given the presence of the previous drain in that space, thus not imperative that we treat what grows on the cultures (resistant or sensitive organisms). Will follow.     ASSESSMENT:  Dax Villareal is a 30 year old male with PMHx significant for alcohol use disorder who was admitted to [facility] in [place], [place] for abdominal pain, nausea and vomiting, found to have end stage liver disease c/b hepatic encephalopathy, acute renal failure requiring iHD, SBP and acute necrotizing pancreatitis.     Afebrile on admission, however has spiked some low grade intermittent fevers up to 100.9 on 7/1-2 then defervesced. Has been afebrile since. WBC significantly elevated on admission to 50K with slow downtrend over the past few weeks, currently 17K. Lactate intermittently elevated into the 2-3 range, otherwise wnl. Infectious work up since admission to [facility] has remained negative. BCx negative. Drain placement pancreatic cultures collected 6/30 NGTD/negative. 7/2 Ascites fluid cultures negative, labs, labs included WBC 2663/74% neutrophils. Repeat ascites fluid cultures 7/5 with 1801 WBC/61% neutrophils, cultures negative. 7/13 ascites fluid cultures NGTD. See  imaging section below for review.     Started on meropenem (6/29- current), micafungin (6/29-7/1), and given 1x dose of IV Vancomycin (6/29). On Rifaximin.  Has completed 16 days of broad spectrum abx while admitted to [facility] as of 7/14.     Thank you for this consult. ID will continue to follow.     Patient was discussed with Dr. Pacheco.     Tavia Romano PA-C  Infectious Diseases  Pager #[phone]          History of Present Illness:   Dax Villareal is a 30 year old male with PMHx significant for alcohol use disorder who was admitted to [facility] in [place], [place] for abdominal pain, nausea and vomiting, found to have end stage liver disease c/b hepatic encephalopathy, acute renal failure requiring iHD, SBP and acute necrotizing pancreatitis.     He required respiratory support (BIPAP, ? Intubation) for respiratory failure and appeared to be in sepsis (lactate reported at 7.7 with abnormal LFTs). He was transferred to [facility] for prolonged admission and reportedly treated with steroids; kidney function and pancreatic enzymes normal on admission). Length of stay stated >40 days. CT imaging 6/19 with concern for necrotizing pancreatitis with follow up CT scan 6/25 with peripancreatic fluid collection and 9 cm soft tissue/hemorrhagic structure c/f internal hemorrhage of a pseudocyst. Given multiple antibiotics (Cefepime, Flagyl, Ciprofloxacin, micafungin). Hospital course reportedly complicated by hypoxemic respiratory failure requiring BIPAP, ARF requiring iHD, hepatic encephalopathy, and septic shock 2/2 necrotizing pancreatitis/pancreatic pseudocyst requiring pressor support. Ultimately transferred to [facility] for interventions 6/28. Admitted to the ICU.     Now status post perc IR drain placement 6/29 with cultures that remain NGTD/negative. No endoscopic drainage by GI was pursued given improvement in imaging 7/11. Additionally has had 3 paracenteses (7/2, 7/5, and 7/13 with negative culture work  "up. Had PEGJ placed 7/13.     He is an outdoorsman. Picks mushrooms, goes fishing/hunting, and hiking. Has not been since \"last spring\". Denies feeling ill prior to admission to OSH. Feeling fairly well currently although notes diffuse abdominal pain. Has had multiple procedures within the past few days and states he feels like he needs a \"rest day\". Denies fever/chills. No URI sx. Is having diarrhea, but on lactulose. Anuric.          Review of Systems:   CONSTITUTIONAL:  No fevers, chills or night sweats.   EYES: negative for redness, or purulent drainage. Positive for icterus.  ENT:  negative for nasal congestion, rhinorrhea, or sore throat.  RESPIRATORY:  negative for cough with sputum and dyspnea.  CARDIOVASCULAR:  negative for chest pain or palpitations.  GASTROINTESTINAL:  negative for nausea, vomiting. Positive for diarrhea and abdominal pain.  GENITOURINARY:  negative for dysuria, frequency, or urgency.   HEME:  No easy bruising or bleeding.  INTEGUMENT:  negative for rash and pruritus. Positive for jaundice.  NEURO:  Negative for headache, vision changes, or numbness/tingling in extremities.         Past Medical History:     Past Medical History:   Diagnosis Date     ADHD 2008            Past Surgical History:     Past Surgical History:   Procedure Laterality Date     ENDOSCOPIC INSERTION TUBE GASTROSTOMY N/A 7/13/2021    Procedure: Upper endoscopy, INSERTION, PEG-J TUBE and Gastropexy;  Surgeon: Rayshawn Will MD;  Location: [facility] OR     ORTHOPEDIC SURGERY Right     fracture repair            Family History:     Family History   Problem Relation Age of Onset     Attention Deficit Disorder Mother      Alcoholism Mother      Alcoholism Father      Alcoholism Brother      Alcoholism Sister             Social History:     Social History     Tobacco Use     Smoking status: Current Every Day Smoker     Smokeless tobacco: Never Used   Substance Use Topics     Alcohol use: Yes     History   Sexual Activity "     Sexual activity: Not on file            Current Medications:       B and C vitamin Complex with folic acid  5 mL Per Feeding Tube Daily     folic acid  1 mg Oral Daily    Or     folic acid  1 mg Intravenous Daily     iodixanol  50 mL Intravenous Once     lactulose  20 g Oral or NG Tube 4x Daily     melatonin  6 mg Oral At Bedtime     meropenem  500 mg Intravenous Q24H     pantoprazole  40 mg Oral QAM AC     potassium chloride  20 mEq Oral BID     rifaximin  550 mg Oral BID     sodium chloride (PF)  10 mL Irrigation Q8H     sodium chloride (PF)  3 mL Intracatheter Q8H     sodium chloride (PF)  9 mL Intracatheter During Dialysis/CRRT (from stock)     sodium chloride (PF)  9 mL Intracatheter During Dialysis/CRRT (from stock)     vitamin B1  100 mg Oral Daily    Or     thiamine  100 mg Intravenous Daily            Allergies:     Allergies   Allergen Reactions     Tylenol [Acetaminophen] Itching            Physical Exam:   Vitals were reviewed  Patient Vitals for the past 24 hrs:   BP Temp Temp src Pulse Resp SpO2 Weight   07/14/21 1300 103/61 97.6  F (36.4  C) Oral 101 14 96 % --   07/14/21 1248 96/52 97.6  F (36.4  C) Axillary 104 15 96 % --   07/14/21 1245 102/64 -- -- 104 19 98 % --   07/14/21 1230 103/58 -- -- 105 15 97 % --   07/14/21 1220 104/66 97.8  F (36.6  C) Oral 96 12 97 % --   07/14/21 1215 104/66 -- -- 98 14 97 % --   07/14/21 1210 -- -- -- 100 17 97 % --   07/14/21 1200 102/55 97.7  F (36.5  C) Oral 104 15 95 % --   07/14/21 1145 104/61 -- -- 97 11 94 % --   07/14/21 1130 (!) 86/55 -- -- 99 11 94 % --   07/14/21 1115 107/62 -- -- 110 17 92 % --   07/14/21 1100 (!) 89/68 -- -- 105 15 92 % --   07/14/21 1045 106/52 -- -- 107 14 90 % --   07/14/21 1030 107/48 -- -- 110 20 90 % --   07/14/21 1015 100/48 -- -- 107 15 94 % --   07/14/21 1004 106/65 97.7  F (36.5  C) Oral 91 -- 97 % --   07/14/21 1000 107/70 -- -- 90 -- 97 % --   07/14/21 0811 109/58 97.6  F (36.4  C) Axillary 110 20 96 % --   07/14/21 0520  99/62 98.3  F (36.8  C) Oral 100 20 97 % --   07/14/21 0010 105/46 98.5  F (36.9  C) Oral 105 16 97 % 68.2 kg (150 lb 5.7 oz)   07/13/21 1907 96/42 98.7  F (37.1  C) Tympanic 115 14 95 % --   07/13/21 1700 -- -- -- 119 -- -- --   07/13/21 1620 105/66 -- -- 112 -- -- --   07/13/21 1600 -- -- -- 117 -- -- --   07/13/21 1510 104/64 98.3  F (36.8  C) Axillary 110 18 98 % --   07/13/21 1430 124/57 98.2  F (36.8  C) Skin 104 18 95 % --   07/13/21 1408 -- -- -- -- -- 95 % --   07/13/21 1400 127/65 97.9  F (36.6  C) Skin 104 14 96 % --   07/13/21 1345 (!) 119/93 97.7  F (36.5  C) Skin 102 15 99 % --       Physical Examination:  Constitutional: Male patient seen lying in bed, in NAD. Alert and interactive.   HEENT: NCAT, icterus, conjunctiva clear. Moist mucous membranes.  Respiratory: Non-labored breathing, good air exchange on RA. Lungs are clear to auscultation bilaterally, without crackles.  Cardiovascular: Regular rate and rhythm with no murmur.  GI: Normoactive BS. Abdomen is soft, non-distended, GJ tube in place without surrounding erythema. Posterior abdominal drain in place with dark red bloody output.  Skin: Warm and dry. Jaundiced. Petechia on torso.   Musculoskeletal: Extremities grossly normal. No edema.  Neurologic: A &O x3, speech normal, answering questions appropriately. Moves all extremities spontaneously. Grossly non-focal.  Neuropsychiatric: Mentation and affect normal/appropriate.  VAD: PICC is c/d/i with no erythema, drainage, or tenderness.         Laboratory Data:     Inflammatory Markers    Recent Labs   Lab Test 10/06/20  0927   CRP 16.9*       Hematology Studies    Recent Labs   Lab Test 07/14/21  0342 07/13/21  0718 07/12/21  2105 07/11/21  0607 07/10/21  1458 07/10/21  0625 07/09/21  0626 06/28/21  1839 05/18/21  1220 12/09/20  1504 10/06/20  0927   WBC 17.7* 21.3* 24.6* 25.8*  --  23.6* 24.7* 50.6* 13.2* 4.2 6.9   ANEU  --   --   --   --   --   --   --  44.0* 12.0* 2.1 5.2   AEOS  --   --   --    --   --   --   --  0.5 0.0 0.2 0.1   HGB 6.9*  6.9* 6.7* 7.4* 7.5* 7.2* 6.4* 7.0* 7.6* 16.6 17.2 18.8*   MCV 96 97 96 97  --  99 99 101* 98 93 89    211 214 204  --  170 191 127* 222 299 311       Metabolic Studies     Recent Labs   Lab Test 07/14/21 0345 07/13/21 0718 07/12/21 2105 07/11/21  0607 07/10/21  0625   * 131* 130* 126* 131*   POTASSIUM 4.2 3.6 3.7 4.6 3.9   CHLORIDE 98 98 97 96 99   CO2 20 23 21 20 22   BUN 42* 29 21 41* 24   CR 3.35* 2.60* 2.06* 3.20* 2.21*   GFRESTIMATED 23* 31* 42* 24* 38*       Hepatic Studies    Recent Labs   Lab Test 07/14/21 0345 07/13/21 0718 07/12/21 2105 07/11/21  0607 07/10/21  0625 07/09/21  0626   BILITOTAL 11.1* 13.9* 14.6* 17.3* 17.7* 20.2*   ALKPHOS 174* 167* 169* 177* 187* 181*   ALBUMIN 2.6* 2.6* 2.7* 2.8* 3.1* 3.0*   AST 54* 60* 60* 79* 108* 114*   ALT 12 15 17 21 24 24       Microbiology:  Culture Micro   Date Value Ref Range Status   07/05/2021 No growth  Final   07/05/2021 Culture negative after 1 week  Preliminary   07/02/2021 No growth  Final   07/02/2021 No growth  Final   07/02/2021 No growth  Final   06/30/2021 No growth  Final   06/30/2021 No anaerobes isolated  Final   06/29/2021 No growth  Final   06/29/2021 No growth  Final   05/18/2021 No growth after 6 days  Final   05/18/2021 No growth after 6 days  Final       Urine Studies    Recent Labs   Lab Test 12/09/20  1817 10/06/20  1214   LEUKEST Negative Negative   WBCU 0 1       Vancomycin Levels    Recent Labs   Lab Test 06/30/21  0700   VANCOMYCIN 31.2*       Hepatitis B Testing   Recent Labs   Lab Test 06/30/21  1245   HEPBANG Nonreactive     Hepatitis C Testing   No results found for: HCVAB, HQTG, HCGENO, HCPCR, HQTRNA, HEPRNA  Respiratory Virus Testing    No results found for: RS, FLUAG    IMAGING    7/11 CT AP w/o contrast   IMPRESSION:   1. Sequela of necrotizing pancreatitis with highly decreased size of  walled off necrosis centered in the lesser sac. Percutaneous drain  remains  in place.  2. Loculated moderate ascites is unchanged.  3. Hepatomegaly, hepatic steatosis, and evidence of portal  hypertension again seen.  4. Small bilateral pleural effusions are slightly improved.    7/4 CT AP w/o contrast   IMPRESSION:   1. Status post placement of a left lateral approach drainage catheter,  substantially decreased but persistent hemorrhagic walled off necrosis  centered in the lesser sac. A smaller collection along the inferior  aspect of the third portion of the duodenum is minimally changed.  2. Minimally changed moderate to large volume loculated ascites.  3. Hepatomegaly and hepatic steatosis with evidence of cirrhosis and  portal hypertension.  4. Diffuse edematous wall thickening of the small and large bowel,  likely reactive. No bowel obstruction.  5. Small right and trace left pleural effusions with adjacent  compressive atelectasis, similar to prior. Small pericardial effusion.  6. Faint patchy ground glass opacities in the right middle lobe and  and right upper lobe, likely infectious/inflammatory.    6/29 CT AP w/ contrast   IMPRESSION:   1.  Sequela of necrotizing pancreatitis with peripancreatic necrotic  fluid collection, large volume abdominal ascites and mesenteric edema.  2.  Thickened, edematous small bowel wall is likely due to reactive  enteritis secondary to pancreatitis.  3.  Hepatomegaly with steatosis and mild surface nodularity,  associated with splenomegaly and recanalized paraumbilical vein,  suggestive of cirrhosis with portal hypertension.  4.  Enlarged hypoenhancing kidneys with loss of corticomedullary  differentiation, suggestive of acute renal parenchymal disease. No  hydronephrosis or perinephric fat stranding.  5.  Small right and trace left pleural effusions, interlobular septal  thickening, and diffuse anasarca, likely related to fluid third  spacing.

## 2021-07-14 NOTE — PLAN OF CARE
Neuro: A&Ox4. Calls as needed.  Cardiac: ST.   VSS.      Respiratory: Sating 98% on RA.  GI/: Apt is anuric and on HD. Multiple BM small soft  Diet/appetite: Tolerating 2 G Na+ diet.  Eats poor appetite.  Pt started on TF over night up 20 ml/hr going up every 8 hours to goal of 65 ml/hr.  FWF 30 ml q 4 hours.  Activity:  Assist of 2, up to repostion.  Pain: At acceptable level on current regimen.   Skin: No new deficits noted.  LDA's:PICC ,  left side drain, PEG tube g is drain to gravity and J is TF.  Pt has HD CVC line     Plan: Continue with POC. Notify primary team with changes.

## 2021-07-14 NOTE — PLAN OF CARE
Neuro: A&Ox4, jaundiced.  Cardiac: SR/ST . VSS.   Respiratory: Sating >95% on RA.  GI/: Anuric, multiple loose BM this shift. Continues on scheduled lactulose.  Diet/appetite: NPO for procedure today, resumed low sodium diet and tolerating well. TF restarted at 20cc/hr after procedure, orders to increase rate every 6 hours. FWF 30cc Q4  Activity:  Assist of 1-2 for bed mobility  Pain: At acceptable level on current regimen.   Skin: No new deficits noted.  LDA's: LUE PICC, right chest HD line, G tube to gravity drain, J tube with continuous feeding, left abd drain to gravity    Plan: Dialysis today, removed 1L. Left abd drain replaced in IR due to leakage. Continue with POC. Notify primary team with changes.

## 2021-07-14 NOTE — PROGRESS NOTES
GASTROENTEROLOGY PROGRESS NOTE    Date of Admission: 6/28/2021  Reason for Admission: pancreatitis/hepatitis      ASSESSMENT/RECOMMENDATIONS:  30 year old male with a history of heavy alcohol use who presented to OSH 5/19 for abdominal pain, nausea and vomiting, admitted for alcohol hepatitis, ESLD, RADHA on HD, septic shock requiring pressors as well as necrotizing pancreatitis with large evolving necrotic collection. Transferred to [facility] for consideration of drainage of presumed infected necrotic collection     #. Acute necrotizing pancreatitis with presumed infected walled off necrotic collection s/p percutaneous drainage  #. Septic shock  #. Leukocytosis  #. Ascites/Peritonitis  Unclear evolution of pancreatitis history but had a recent CT scan with large evolving necrotic collection, presumed infected. No obvious gas in collection. CT on admission to [facility] showing mature collection with enhancing wall measuring ~[id number]. With profound coagulopathy (liver disease + malnutrition +sepsis), INR 3.8 on transfer he was given FFP and Vit K, IR placed perc drain 6/29. He may eventually warrant endoscopic drainage however repeat imaging (7/11) shows that collection is largely decompressed and no endoscopic window at this point. Hold off on endoscopic drainage  Etiology: alcohol  Date of onset: 5/19  BISAP score on admission: unclear  Concurrent organ failure: respiratory (now extubated), renal (on HD), liver, cardiogenic (weaned off pressors 6/29)  Thromboses: none  Diabetes: no  Current nutrition access: PEG-J  Last imaging: CT scan without IV contrast 7/11  Infection/antiinfectives (cultures negative so far):                 - Meropenem (6/29 - present)                - Vancomycin (6/29 - 6/30)     - Micafungin (6/29 - 7/1)   Intervention:    6/30 - Left IR RP perc drain (24F)    7/2 - Diagnostic paracentesis (WBC 2663 - 74% PMN, SAAG <1.1, protein 4.1)     7/5 - Repeat Paracentesis (WBC 1801 - 61% PMN, lipase  "48)    7/13 - Diag/therapeutic paracentesis (studies pending)    7/13 - PEG-J placement with gastropexy    -Defer endoscopic drainage for now  -Drain management per IR (they are replacing broken tube today)  -Await fluid studies from paracentesis from 7/13  -De-escalate antibiotics to oral cipro/flagyl today and continue for one more week (ID consulting today)    #. Severe malnutrition in the context of chronic illness  #. S/p PEG-J placement  Patient with very poor oral intake over the course of his illness (almost 2 months) and is not meeting calorie requirements orally. S/p PEG-J with T-tag gastropexy (x3) 7/13.  -Advance tube feeds as tolerated  -Consider starting PERT, fecal elastase not checked but likely to be artificially low given loose stools  -Oral diet as tolerated  -T-tags (white buttons) should be removed 2 weeks following procedure (7/27) by lifting the button and cutting the underlying blue suture. Often times the buttons fall off prior to the 2 week louis.     #. Alcohol hepatitis  #. Hyperbilirubinemia  #. Coagulopathy/Thrombocytopenia  #. Encephalopathy  Known heavy alcohol use. Reportedly received course of steroids at OSH for 1 week with \"some improvement\". Currently has primarily hyperbilirubinemia and mildly elevated AST. Unclear if he has underlying cirrhosis but certainly would fit with alcohol hepatitis vs elevated liver tests of critical illness/sepsis. Not candidate for steroids right now. He is receiving lactulose and Xifaxin for encephalopathy but having a lot of stool output. Hepatology notified of admission, ongoing discussions with them. Not currently liver transplant candidate.  -Trend MELD labs  -Continue lactulose and titrate to 3 soft stools per day -- please record stool output  -Alcohol abstinence    This patient has a very poor overall prognosis and agree with palliative care consultation and GOC discussions with the family    The patient was discussed and plan agreed upon with " "GI staff, Dr Suman Norwood, PADanielleC  Advanced Endoscopy/Pancreaticobiliary GI Service  [facility]  Text Page  _______________________________________________________________      Subjective: Nursing notes and 24hr events reviewed. Patient seen and examined at 0915. Patient initially reports that he is doing well but then states he has a lot of abdominal pain, especially at the PEG-J site. Hgb low this morning, going to receive a blood transfusion during dialysis. No fevers over night.    ROS:   4 pt ROS negative unless noted in subjective.     Objective:  Blood pressure 109/58, pulse 110, temperature 97.6  F (36.4  C), temperature source Axillary, resp. rate 20, height 1.676 m (5' 6\"), weight 68.2 kg (150 lb 5.7 oz), SpO2 96 %.  Gen: Laying in bed. Appears comfortable  HEENT: NCAT. Conjunctiva clear. Sclera ++icteric, poor dentition  CV: tachycardic, Peripheral perfusion intact  Resp: non labored breathing  Abd: minimal distention, minimal dark red/brown output in perc drain, GJ tube with dried blood but patient would not let me change the dressing (wanted to wait for pain medication to \"kick in\"), clear/yellow output in gravity bag  Msk: no gross deformity  Skin:  ++ jaundice  Ext: warm, well perfused  Neuro: non specific  Mental status/Psych: alert and oriented, flat affect, somnolent    LABS:  BMP  Recent Labs   Lab 07/14/21  0345 07/13/21  1050 07/13/21  0718 07/12/21  2105 07/11/21  0607   *  --  131* 130* 126*   POTASSIUM 4.2  --  3.6 3.7 4.6   CHLORIDE 98  --  98 97 96   JANENE 8.6  --  8.5 8.6 8.3*   CO2 20  --  23 21 20   BUN 42*  --  29 21 41*   CR 3.35*  --  2.60* 2.06* 3.20*   * 101* 94 91 95     CBC  Recent Labs   Lab 07/14/21  0342 07/13/21  0718 07/12/21  2105 07/11/21  0607   WBC 17.7* 21.3* 24.6* 25.8*   RBC 2.15* 2.10* 2.22* 2.34*   HGB 6.9*  6.9* 6.7* 7.4* 7.5*   HCT 20.7* 20.4* 21.3* 22.7*   MCV 96 97 96 97   MCH 32.1 31.9 33.3* 32.1   MCHC " 33.3 32.8 34.7 33.0   RDW 20.9* 21.2* 20.7* 22.1*    211 214 204     INR  Recent Labs   Lab 07/14/21 0343 07/13/21 0718 07/12/21 2105 07/11/21  0607   INR 1.75* 1.78* 1.83* 1.91*     LFTs  Recent Labs   Lab 07/14/21 0345 07/13/21 0718 07/12/21 2105 07/11/21  0607   ALKPHOS 174* 167* 169* 177*   AST 54* 60* 60* 79*   ALT 12 15 17 21   BILITOTAL 11.1* 13.9* 14.6* 17.3*   PROTTOTAL 7.4 6.9 7.1 7.5   ALBUMIN 2.6* 2.6* 2.7* 2.8*      PANC  No lab results found in last 7 days.      IMAGING:  EXAMINATION: CT ABDOMEN PELVIS W/O CONTRAST, 7/11/2021 11:21 AM                                                                   IMPRESSION:   1. Sequela of necrotizing pancreatitis with highly decreased size of  walled off necrosis centered in the lesser sac. Percutaneous drain  remains in place.  2. Loculated moderate ascites is unchanged.  3. Hepatomegaly, hepatic steatosis, and evidence of portal  hypertension again seen.  4. Small bilateral pleural effusions are slightly improved.

## 2021-07-14 NOTE — PROGRESS NOTES
Nephrology Progress Note  07/14/2021         Dax Villareal is a 30 year old male with h/o ETOH hepatitis, end stage liver disease, RADHA on HD, transferred from [facility] in [place] for septic shock on 6/28/21 for treatment of necrotizing pancreatitis and decompensated liver disease. He was initially admitted to [facility] 5/19/21.        Interval History :   Mr Villareal was seen on HD this am, in no distress with goal of 2L of UF.  No major changes in nephrology plan, had PEG tube placed yesterday.  Next HD run 7/16.    Assessment & Recommendations:   RADHA-Cr was 0.8 as recently as 5/19/2021 but now HD dependent, started RRT on 6/28/21 at [facility] prior to transfer to [facility].  Etiology likely multifactorial with contrast, sepsis/pancreatitis, likely HRS physiology contributing as well.  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  Continuing RRT as long as it is tolerated with regards to BP's, is functional enough to possibly do outpt HD.  MELD is in high 30's so has high 3 month mortality but continuing restorative cares for now.                -Line is TDC from PTA               -HD today, had planned for 2-3L but was NPO yesterday so may have to reduce goal if BP's drop.       Volume status-Anuric, no major edema on exam, wt is down nearly 10kg since admission but was NPO yesterday and made some UOP so will be conservative with UF today and try to keep SBP >100.       Electrolytes/pH-K 4.2, bicarb 20, no acute issues.      Ca/phos/pth-Ca 8.6, Mg and Phos last checked 7/7, ordered for tomorrow.       Anemia-Hgb 6.9, acute management per team, last PRBC's 7/10.       Nutrition-Taking PO, appetite ok.       Discussed with Dr Reid     Recommendations were communicated to primary team via verbal communication.        Tor Larsen, SOFIA CNS  Clinical Nurse Specialist  [phone]    Review of Systems:   I reviewed the following systems:  Gen: No fevers or chills  CV: No CP at rest  Resp: No SOB at rest  GI: No  "N/V    Physical Exam:   I/O last 3 completed shifts:  In: 760 [I.V.:400; Other:40; NG/GT:260]  Out: 811 [Emesis/NG output:600; Drains:210; Blood:1]   /66   Pulse 96   Temp 97.8  F (36.6  C) (Oral)   Resp 12   Ht 1.676 m (5' 6\")   Wt 68.2 kg (150 lb 5.7 oz)   SpO2 97%   BMI 24.27 kg/m       GENERAL APPEARANCE: Seen on HD, in no distress.    EYES:  scleral icterus, pupils equal  Pulmonary: lungs clear to auscultation. Off supplemental oxygen   CV: tachy   - Edema - no LE edema  GI: large distended, tender. Has retroperitoneal drain  MS: no evidence of inflammation in joints, no muscle tenderness  SKIN: no rash, warm, dry, no cyanosis  NEURO: alert/interactive  ACCESS: Right TDC     Labs:   All labs reviewed by me  Electrolytes/Renal - Recent Labs   Lab Test 07/14/21  0345 07/13/21  1050 07/13/21  0718 07/12/21  2105 07/08/21  0538 07/07/21  0623 07/05/21  0625 07/04/21  0609 07/01/21  0014 06/30/21  0445   *  --  131* 130*  --  134 148* 144   < > 138   POTASSIUM 4.2  --  3.6 3.7  --  3.3* 3.1* 2.8*  --  3.1*   CHLORIDE 98  --  98 97  --  102 116* 111*   < > 104   CO2 20  --  23 21  --  18* 18* 21   < > 22   BUN 42*  --  29 21  --  53* 52* 35*   < > 34*   CR 3.35*  --  2.60* 2.06*  --  3.91* 4.74* 3.43*   < > 3.02*   * 101* 94 91   < > 89 123* 121*   < > 130*   JANENE 8.6  --  8.5 8.6  --  9.1 9.0 8.6   < > 8.4*   MAG  --   --   --   --   --   --  2.3 2.1  --  2.4*   PHOS  --   --   --   --   --  3.9 4.9*  --   --  5.3*    < > = values in this interval not displayed.       CBC -   Recent Labs   Lab Test 07/14/21 0342 07/13/21 0718 07/12/21 2105   WBC 17.7* 21.3* 24.6*   HGB 6.9*  6.9* 6.7* 7.4*    211 214       LFTs -   Recent Labs   Lab Test 07/14/21 0345 07/13/21 0718 07/12/21  2105   ALKPHOS 174* 167* 169*   BILITOTAL 11.1* 13.9* 14.6*   ALT 12 15 17   AST 54* 60* 60*   PROTTOTAL 7.4 6.9 7.1   ALBUMIN 2.6* 2.6* 2.7*       Iron Panel - No lab results found.        Current " Medications:    B and C vitamin Complex with folic acid  5 mL Per Feeding Tube Daily     folic acid  1 mg Oral Daily    Or     folic acid  1 mg Intravenous Daily     lactulose  20 g Oral or NG Tube 4x Daily     melatonin  6 mg Oral At Bedtime     meropenem  500 mg Intravenous Q24H     pantoprazole  40 mg Oral QAM AC     potassium chloride  20 mEq Oral BID     rifaximin  550 mg Oral BID     sodium chloride (PF)  10 mL Irrigation Q8H     sodium chloride (PF)  3 mL Intracatheter Q8H     sodium chloride (PF)  9 mL Intracatheter During Dialysis/CRRT (from stock)     sodium chloride (PF)  9 mL Intracatheter During Dialysis/CRRT (from stock)     vitamin B1  100 mg Oral Daily    Or     thiamine  100 mg Intravenous Daily       - MEDICATION INSTRUCTIONS -       dextrose

## 2021-07-14 NOTE — PRE-PROCEDURE
GENERAL PRE-PROCEDURE:   Procedure:  Drain exchange  Date/Time:  7/14/2021 1:35 PM    Verbal consent obtained?: Yes    Risks and benefits: Risks, benefits and alternatives were discussed    Patient's understanding of procedure matches consent: Yes    Procedure consent matches procedure scheduled: Yes    Appropriately NPO:  Yes  ASA Class:  Class 3- Severe systemic disease, definite functional limitations  Mallampati  :  Grade 2- soft palate, base of uvula, tonsillar pillars, and portion of posterior pharyngeal wall visible  Lungs:  Lungs clear with good breath sounds bilaterally  Heart:  Normal heart sounds and rate  History & Physical reviewed:  History and physical reviewed and no updates needed  Statement of review:  I have reviewed the lab findings, diagnostic data, medications, and the plan for sedation

## 2021-07-15 ENCOUNTER — APPOINTMENT (OUTPATIENT)
Dept: EDUCATION SERVICES | Facility: CLINIC | Age: 31
End: 2021-07-15
Attending: INTERNAL MEDICINE
Payer: MEDICAID

## 2021-07-15 ENCOUNTER — APPOINTMENT (OUTPATIENT)
Dept: PHYSICAL THERAPY | Facility: CLINIC | Age: 31
End: 2021-07-15
Attending: INTERNAL MEDICINE
Payer: MEDICAID

## 2021-07-15 ENCOUNTER — APPOINTMENT (OUTPATIENT)
Dept: SPEECH THERAPY | Facility: CLINIC | Age: 31
End: 2021-07-15
Attending: INTERNAL MEDICINE
Payer: MEDICAID

## 2021-07-15 ENCOUNTER — APPOINTMENT (OUTPATIENT)
Dept: GENERAL RADIOLOGY | Facility: CLINIC | Age: 31
End: 2021-07-15
Attending: INTERNAL MEDICINE
Payer: MEDICAID

## 2021-07-15 LAB
ALBUMIN SERPL-MCNC: 2.7 G/DL (ref 3.4–5)
ALP SERPL-CCNC: 182 U/L (ref 40–150)
ALT SERPL W P-5'-P-CCNC: 16 U/L (ref 0–70)
ANION GAP SERPL CALCULATED.3IONS-SCNC: 10 MMOL/L (ref 3–14)
AST SERPL W P-5'-P-CCNC: 86 U/L (ref 0–45)
BILIRUB SERPL-MCNC: 9.3 MG/DL (ref 0.2–1.3)
BUN SERPL-MCNC: 26 MG/DL (ref 7–30)
CALCIUM SERPL-MCNC: 8.1 MG/DL (ref 8.5–10.1)
CHLORIDE BLD-SCNC: 98 MMOL/L (ref 94–109)
CO2 SERPL-SCNC: 23 MMOL/L (ref 20–32)
CREAT SERPL-MCNC: 2.3 MG/DL (ref 0.66–1.25)
ERYTHROCYTE [DISTWIDTH] IN BLOOD BY AUTOMATED COUNT: 22.2 % (ref 10–15)
GFR SERPL CREATININE-BSD FRML MDRD: 36 ML/MIN/1.73M2
GLUCOSE BLD-MCNC: 131 MG/DL (ref 70–99)
GLUCOSE BLDC GLUCOMTR-MCNC: 126 MG/DL (ref 70–99)
HCT VFR BLD AUTO: 24.2 % (ref 40–53)
HGB BLD-MCNC: 8 G/DL (ref 13.3–17.7)
HOLD SPECIMEN: NORMAL
INR PPP: 1.6 (ref 0.85–1.15)
LACTATE SERPL-SCNC: 2.1 MMOL/L (ref 0.7–2)
MAGNESIUM SERPL-MCNC: 1.6 MG/DL (ref 1.6–2.3)
MCH RBC QN AUTO: 31.9 PG (ref 26.5–33)
MCHC RBC AUTO-ENTMCNC: 33.1 G/DL (ref 31.5–36.5)
MCV RBC AUTO: 96 FL (ref 78–100)
PHOSPHATE SERPL-MCNC: 3 MG/DL (ref 2.5–4.5)
PLATELET # BLD AUTO: 219 10E3/UL (ref 150–450)
POTASSIUM BLD-SCNC: 3.3 MMOL/L (ref 3.4–5.3)
PROT SERPL-MCNC: 7.6 G/DL (ref 6.8–8.8)
RBC # BLD AUTO: 2.51 10E6/UL (ref 4.4–5.9)
SODIUM SERPL-SCNC: 131 MMOL/L (ref 133–144)
WBC # BLD AUTO: 20.3 10E3/UL (ref 4–11)

## 2021-07-15 PROCEDURE — 250N000011 HC RX IP 250 OP 636: Performed by: INTERNAL MEDICINE

## 2021-07-15 PROCEDURE — 80053 COMPREHEN METABOLIC PANEL: CPT | Performed by: INTERNAL MEDICINE

## 2021-07-15 PROCEDURE — 71045 X-RAY EXAM CHEST 1 VIEW: CPT

## 2021-07-15 PROCEDURE — 36592 COLLECT BLOOD FROM PICC: CPT | Performed by: INTERNAL MEDICINE

## 2021-07-15 PROCEDURE — 250N000013 HC RX MED GY IP 250 OP 250 PS 637: Performed by: INTERNAL MEDICINE

## 2021-07-15 PROCEDURE — 99232 SBSQ HOSP IP/OBS MODERATE 35: CPT | Performed by: INTERNAL MEDICINE

## 2021-07-15 PROCEDURE — 83605 ASSAY OF LACTIC ACID: CPT | Performed by: INTERNAL MEDICINE

## 2021-07-15 PROCEDURE — 120N000003 HC R&B IMCU UMMC

## 2021-07-15 PROCEDURE — G0463 HOSPITAL OUTPT CLINIC VISIT: HCPCS

## 2021-07-15 PROCEDURE — 82040 ASSAY OF SERUM ALBUMIN: CPT | Performed by: INTERNAL MEDICINE

## 2021-07-15 PROCEDURE — 250N000013 HC RX MED GY IP 250 OP 250 PS 637: Performed by: STUDENT IN AN ORGANIZED HEALTH CARE EDUCATION/TRAINING PROGRAM

## 2021-07-15 PROCEDURE — 92526 ORAL FUNCTION THERAPY: CPT | Mod: GN

## 2021-07-15 PROCEDURE — 97530 THERAPEUTIC ACTIVITIES: CPT | Mod: GP

## 2021-07-15 PROCEDURE — 99233 SBSQ HOSP IP/OBS HIGH 50: CPT | Mod: GC | Performed by: INTERNAL MEDICINE

## 2021-07-15 PROCEDURE — 99233 SBSQ HOSP IP/OBS HIGH 50: CPT | Performed by: FAMILY MEDICINE

## 2021-07-15 PROCEDURE — 83735 ASSAY OF MAGNESIUM: CPT | Performed by: STUDENT IN AN ORGANIZED HEALTH CARE EDUCATION/TRAINING PROGRAM

## 2021-07-15 PROCEDURE — 85027 COMPLETE CBC AUTOMATED: CPT | Performed by: INTERNAL MEDICINE

## 2021-07-15 PROCEDURE — 71045 X-RAY EXAM CHEST 1 VIEW: CPT | Mod: 26 | Performed by: RADIOLOGY

## 2021-07-15 PROCEDURE — 250N000009 HC RX 250: Performed by: INTERNAL MEDICINE

## 2021-07-15 PROCEDURE — 99232 SBSQ HOSP IP/OBS MODERATE 35: CPT | Mod: GC | Performed by: STUDENT IN AN ORGANIZED HEALTH CARE EDUCATION/TRAINING PROGRAM

## 2021-07-15 PROCEDURE — 85610 PROTHROMBIN TIME: CPT | Performed by: INTERNAL MEDICINE

## 2021-07-15 PROCEDURE — 36593 DECLOT VASCULAR DEVICE: CPT

## 2021-07-15 PROCEDURE — 250N000011 HC RX IP 250 OP 636

## 2021-07-15 PROCEDURE — 84100 ASSAY OF PHOSPHORUS: CPT | Performed by: STUDENT IN AN ORGANIZED HEALTH CARE EDUCATION/TRAINING PROGRAM

## 2021-07-15 RX ADMIN — HYDROMORPHONE HYDROCHLORIDE 2 MG: 1 SOLUTION ORAL at 10:51

## 2021-07-15 RX ADMIN — HYDROMORPHONE HYDROCHLORIDE 2 MG: 1 SOLUTION ORAL at 19:49

## 2021-07-15 RX ADMIN — FOLIC ACID 1 MG: 1 TABLET ORAL at 08:26

## 2021-07-15 RX ADMIN — LACTULOSE 20 G: 10 SOLUTION ORAL; RECTAL at 09:37

## 2021-07-15 RX ADMIN — MEROPENEM 500 MG: 500 INJECTION, POWDER, FOR SOLUTION INTRAVENOUS at 19:57

## 2021-07-15 RX ADMIN — Medication 40 MG: at 08:26

## 2021-07-15 RX ADMIN — ALTEPLASE 2 MG: 2.2 INJECTION, POWDER, LYOPHILIZED, FOR SOLUTION INTRAVENOUS at 06:41

## 2021-07-15 RX ADMIN — LACTULOSE 20 G: 10 SOLUTION ORAL; RECTAL at 19:57

## 2021-07-15 RX ADMIN — HYDROMORPHONE HYDROCHLORIDE 2 MG: 1 SOLUTION ORAL at 00:25

## 2021-07-15 RX ADMIN — MELATONIN TAB 3 MG 6 MG: 3 TAB at 00:25

## 2021-07-15 RX ADMIN — THIAMINE HCL TAB 100 MG 100 MG: 100 TAB at 08:26

## 2021-07-15 RX ADMIN — POTASSIUM CHLORIDE 20 MEQ: 1.5 POWDER, FOR SOLUTION ORAL at 19:57

## 2021-07-15 RX ADMIN — Medication 550 MG: at 19:59

## 2021-07-15 RX ADMIN — HYDROMORPHONE HYDROCHLORIDE 2 MG: 1 SOLUTION ORAL at 06:48

## 2021-07-15 RX ADMIN — Medication 5 ML: at 09:37

## 2021-07-15 RX ADMIN — MELATONIN TAB 3 MG 6 MG: 3 TAB at 21:40

## 2021-07-15 RX ADMIN — ALTEPLASE 2 MG: 2.2 INJECTION, POWDER, LYOPHILIZED, FOR SOLUTION INTRAVENOUS at 05:05

## 2021-07-15 RX ADMIN — ONDANSETRON 4 MG: 2 INJECTION INTRAMUSCULAR; INTRAVENOUS at 08:28

## 2021-07-15 RX ADMIN — HYDROMORPHONE HYDROCHLORIDE 2 MG: 1 SOLUTION ORAL at 15:18

## 2021-07-15 RX ADMIN — Medication 550 MG: at 09:37

## 2021-07-15 RX ADMIN — HYDROXYZINE HYDROCHLORIDE 25 MG: 25 TABLET, FILM COATED ORAL at 00:25

## 2021-07-15 RX ADMIN — ONDANSETRON 4 MG: 2 INJECTION INTRAMUSCULAR; INTRAVENOUS at 19:49

## 2021-07-15 RX ADMIN — POTASSIUM CHLORIDE 20 MEQ: 1.5 POWDER, FOR SOLUTION ORAL at 09:37

## 2021-07-15 ASSESSMENT — ACTIVITIES OF DAILY LIVING (ADL)
ADLS_ACUITY_SCORE: 18

## 2021-07-15 ASSESSMENT — MIFFLIN-ST. JEOR: SCORE: 1632.75

## 2021-07-15 NOTE — PROVIDER NOTIFICATION
07/14/21 2000   Post RRT Intervention Assessment   Post RRT Assessment Stable/Improved   Date Follow Up Done 07/14/21   Time Follow Up Done 2250   Comments VSS

## 2021-07-15 NOTE — PLAN OF CARE
Speech Language Therapy Discharge Summary    Reason for therapy discharge:    All goals and outcomes met, no further needs identified.    Progress towards therapy goal(s). See goals on Care Plan in Epic electronic health record for goal details.  Goals met    Therapy recommendation(s):    No further therapy is recommended.        Recommend pt continue regular textures and thin liquids with supervision. Pt should be fully upright and alert for all PO, take small sips/bites, slow pacing, and alternate between consistencies. Goals met.

## 2021-07-15 NOTE — PROGRESS NOTES
[facility] PROGRESS NOTE     Patient:  Dax Villareal   Date of birth 1990, Medical record number [id number]  Date of Visit:  07/15/2021  Date of Admission: 6/28/2021  Consult Requester:Patricia Baker, *          Assessment and Plan:   RECOMMENDATION:  1. Continue antibiotic coverage (either with current meropenem or switch to cipro/flagyl per GI) for additional 4 days to complete a 21 day course of treatment from initial perc pancreatic drain placement (through 7/18). Then would discontinue and monitor off antibiotics.    2. Would draw BCx if patient spikes fever.   3. Will follow recent cultures.      ASSESSMENT:  Dax Villareal is a 30 year old male with PMHx significant for alcohol use disorder who was admitted to [facility] in [place], [place] for abdominal pain, nausea and vomiting, found to have end stage liver disease c/b hepatic encephalopathy, acute renal failure requiring iHD, SBP and acute necrotizing pancreatitis.      Afebrile on admission, however has spiked some low grade intermittent fevers up to 100.9 on 7/1-2 then defervesced. Has been afebrile since. WBC significantly elevated on admission to 50K with slow downtrend over the past few weeks, currently 17K. Lactate intermittently elevated into the 2-3 range, otherwise wnl. Infectious work up since admission to [facility] has remained negative. BCx negative. Drain placement pancreatic cultures collected 6/30 NGTD/negative. 7/2 Ascites fluid cultures negative, labs, labs included WBC 2663/74% neutrophils. Repeat ascites fluid cultures 7/5 with 1801 WBC/61% neutrophils, cultures negative. 7/13 ascites fluid cultures NGTD. See imaging section below for review.      Started on meropenem (6/29- current), micafungin (6/29-7/1), and given 1x dose of IV Vancomycin (6/29). On Rifaximin.  Has completed 16 days of broad spectrum abx while admitted to [facility] as of 7/14. Reasonable to continue coverage on antibiotics (meropenem or  "cipro/flagyl per GI) for the next 4-5 days post replacement of perc drain then would discontinue 7/18. This will give him a total of 21 days of broad spectrum antibiotics post initial perc drain placement for necrotizing pancreatitis with pseudocyst. Cultures remain largely negative. Abscess culture pending, gram stain with GPCs.      Thank you for this consult. ID will continue to follow.      Patient was discussed with Dr. Pacheco.      Tavia Romano PA-C  Infectious Diseases  Pager #[phone]         Interim History and Events:   Patient is feeling okay today, looking forward to a \"rest day\". Continues to have some abdominal discomfort but he feels like its a bit better. No new complaints. Denies SOB, n/v. Diarrhea 2/2 lactulose protocol.          HPI:   Dax Villareal is a 30 year old male with PMHx significant for alcohol use disorder who was admitted to [facility] in [place], [place] for abdominal pain, nausea and vomiting, found to have end stage liver disease c/b hepatic encephalopathy, acute renal failure requiring iHD, SBP and acute necrotizing pancreatitis.      He required respiratory support (BIPAP, ? Intubation) for respiratory failure and appeared to be in sepsis (lactate reported at 7.7 with abnormal LFTs). He was transferred to [facility] for prolonged admission and reportedly treated with steroids; kidney function and pancreatic enzymes normal on admission). Length of stay stated >40 days. CT imaging 6/19 with concern for necrotizing pancreatitis with follow up CT scan 6/25 with peripancreatic fluid collection and 9 cm soft tissue/hemorrhagic structure c/f internal hemorrhage of a pseudocyst. Given multiple antibiotics (Cefepime, Flagyl, Ciprofloxacin, micafungin). Hospital course reportedly complicated by hypoxemic respiratory failure requiring BIPAP, ARF requiring iHD, hepatic encephalopathy, and septic shock 2/2 necrotizing pancreatitis/pancreatic pseudocyst requiring pressor support. " "Ultimately transferred to [facility] for interventions 6/28. Admitted to the ICU.      Now status post perc IR drain placement 6/29 with cultures that remain NGTD/negative. No endoscopic drainage by GI was pursued given improvement in imaging 7/11. Additionally has had 3 paracenteses (7/2, 7/5, and 7/13 with negative culture work up. Had PEGJ placed 7/13.      He is an outdoorsman. Picks mushrooms, goes fishing/hunting, and hiking. Has not been since \"last spring\". Denies feeling ill prior to admission to OSH. Feeling fairly well currently although notes diffuse abdominal pain. Has had multiple procedures within the past few days and states he feels like he needs a \"rest day\". Denies fever/chills. No URI sx. Is having diarrhea, but on lactulose. Anuric.          ROS:   -Focused 5 point ROS completed, pertinent positives and negatives listed above.    Physical Examination:  Temp: 98.5  F (36.9  C) Temp src: Oral BP: 108/44 Pulse: 117   Resp: 16 SpO2: 96 % O2 Device: None (Room air) Oxygen Delivery: 2 LPM    Vitals:    07/12/21 0919 07/12/21 1330 07/13/21 0011 07/14/21 0010   Weight: 71 kg (156 lb 8.4 oz) 70 kg (154 lb 5.2 oz) 69 kg (152 lb 1.9 oz) 68.2 kg (150 lb 5.7 oz)    07/15/21 0016   Weight: 73.5 kg (162 lb 0.6 oz)       Constitutional: Pleasant male patient seen lying in bed, in NAD. Alert and interactive.   HEENT: NCAT, icterus, conjunctiva clear. Moist mucous membranes.  Respiratory: Non-labored breathing, good air exchange on RA. Lungs are clear to auscultation bilaterally, crackles.  Cardiovascular: Regular rate and rhythm with no murmur.  GI: Normoactive BS. Abdomen is soft, non-distended, GJ tube in place without surrounding erythema or drainage.  Skin: Warm and dry.Jaundiced. No rashes, scattered petechiae on torso.  Musculoskeletal: Extremities grossly normal. No tenderness or edema present.   Neurologic: A &O x3, speech normal, answering questions appropriately. Moves all extremities spontaneously. Grossly " non-focal.  Neuropsychiatric: Mentation and affect normal/appropriate.  VAD: CVC is c/d/i with no erythema, drainage, or tenderness.      Medications:    B and C vitamin Complex with folic acid  5 mL Per Feeding Tube Daily     folic acid  1 mg Oral Daily    Or     folic acid  1 mg Intravenous Daily     lactulose  20 g Oral or NG Tube 4x Daily     melatonin  6 mg Oral At Bedtime     meropenem  500 mg Intravenous Q24H     pantoprazole  40 mg Oral QAM AC     potassium chloride  20 mEq Oral BID     rifaximin  550 mg Oral BID     sodium chloride (PF)  10 mL Irrigation Q8H     sodium chloride (PF)  3 mL Intracatheter Q8H     vitamin B1  100 mg Oral Daily    Or     thiamine  100 mg Intravenous Daily       Infusions/Drips:    - MEDICATION INSTRUCTIONS -       dextrose         Laboratory Data:   No results found for: ACD4    Inflammatory Markers    Recent Labs   Lab Test 10/06/20  0927   CRP 16.9*       Metabolic Studies       Recent Labs   Lab Test 07/15/21  0811 07/15/21  0443 07/14/21  1945 07/14/21  0345 07/13/21  1050 07/13/21  0718 07/12/21  2123 07/12/21  2105 07/11/21  1740 07/11/21  0607 07/10/21  0625 07/10/21  0625   NA  --  131*  --  130*  --  131*  --  130*  --  126*  --  131*   POTASSIUM  --  3.3*  --  4.2  --  3.6  --  3.7  --  4.6  --  3.9   CHLORIDE  --  98  --  98  --  98  --  97  --  96  --  99   CO2  --  23  --  20  --  23  --  21  --  20  --  22   ANIONGAP  --  10  --  12  --  10  --  12  --  10  --  10   BUN  --  26  --  42*  --  29  --  21  --  41*  --  24   CR  --  2.30*  --  3.35*  --  2.60*  --  2.06*  --  3.20*  --  2.21*   GFRESTIMATED  --  36*  --  23*  --  31*  --  42*  --  24*  --  38*   * 131*  --  137* 101* 94  --  91  --  95  --  105*   JANENE  --  8.1*  --  8.6  --  8.5  --  8.6  --  8.3*  --  8.4*   PHOS  --  3.0  --  4.6*  --  4.3  --  3.4   < >  --   --   --    MAG  --  1.6  --  1.6  --  1.5*   < >  --   --   --   --   --    LACT  --   --  2.7*  --   --  0.9  --   --   --   --    < >  1.4    < > = values in this interval not displayed.       Hepatic Studies    Recent Labs   Lab Test 07/15/21  0443 07/14/21  0345 07/13/21  0718 07/12/21  2105 07/11/21  0607 07/10/21  0625   BILITOTAL 9.3* 11.1* 13.9* 14.6* 17.3* 17.7*   ALKPHOS 182* 174* 167* 169* 177* 187*   ALBUMIN 2.7* 2.6* 2.6* 2.7* 2.8* 3.1*   AST 86* 54* 60* 60* 79* 108*   ALT 16 12 15 17 21 24       Pancreatitis testing    Recent Labs   Lab Test 06/29/21  0410 06/28/21 1839 05/18/21  1220 12/09/20  1504 10/06/20  0927   LIPASE  --  199 381 140 139   TRIG Unsatisfactory specimen - icteric Canceled, Test credited  --   --   --        Hematology Studies      Recent Labs   Lab Test 07/15/21  0443 07/14/21  2240 07/14/21  0342 07/13/21  0718 07/12/21  2105 07/11/21  0607 07/10/21  0625 06/28/21 1839 05/18/21  1220 12/09/20  1504 10/06/20  0927   WBC 20.3*  --  17.7* 21.3* 24.6* 25.8* 23.6* 50.6* 13.2* 4.2 6.9   ANEU  --   --   --   --   --   --   --  44.0* 12.0* 2.1 5.2   ALYM  --   --   --   --   --   --   --  0.0* 0.6* 1.3 0.8   MEDHAT  --   --   --   --   --   --   --  2.0* 0.4 0.6 0.8   AEOS  --   --   --   --   --   --   --  0.5 0.0 0.2 0.1   HGB 8.0* 7.9* 6.9*  6.9* 6.7* 7.4* 7.5* 6.4* 7.6* 16.6 17.2 18.8*   HCT 24.2*  --  20.7* 20.4* 21.3* 22.7* 19.6* 23.4* 44.8 47.7 51.2     --  213 211 214 204 170 127* 222 299 311       Arterial Blood Gas Testing    Recent Labs   Lab Test 07/01/21  0014 06/30/21  1716 06/28/21  1839   O2PER 60.0 5L 2L        Urine Studies     Recent Labs   Lab Test 12/09/20  1817 10/06/20  1214   URINEPH 7.0 7.0   NITRITE Negative Negative   LEUKEST Negative Negative   WBCU 0 1       Vancomycin Levels     Recent Labs   Lab Test 06/30/21  0700   VANCOMYCIN 31.2*       Tobramycin levels     No lab results found.    Gentamicin levels    No lab results found.    CSF testing   No lab results found.      Microbiology:  Culture Micro   Date Value Ref Range Status   07/05/2021 No growth  Final   07/05/2021 Culture  negative after 1 week  Preliminary   2021 No growth  Final   2021 No growth  Final   2021 No growth  Final   2021 No growth  Final   2021 No anaerobes isolated  Final   2021 No growth  Final   2021 No growth  Final   2021 No growth after 6 days  Final   2021 No growth after 6 days  Final       Last check of C difficile  No results found for: CDBPCT    Imagin/11 CT AP w/o contrast   IMPRESSION:   1. Sequela of necrotizing pancreatitis with highly decreased size of  walled off necrosis centered in the lesser sac. Percutaneous drain  remains in place.  2. Loculated moderate ascites is unchanged.  3. Hepatomegaly, hepatic steatosis, and evidence of portal  hypertension again seen.  4. Small bilateral pleural effusions are slightly improved.      CT AP w/o contrast   IMPRESSION:   1. Status post placement of a left lateral approach drainage catheter,  substantially decreased but persistent hemorrhagic walled off necrosis  centered in the lesser sac. A smaller collection along the inferior  aspect of the third portion of the duodenum is minimally changed.  2. Minimally changed moderate to large volume loculated ascites.  3. Hepatomegaly and hepatic steatosis with evidence of cirrhosis and  portal hypertension.  4. Diffuse edematous wall thickening of the small and large bowel,  likely reactive. No bowel obstruction.  5. Small right and trace left pleural effusions with adjacent  compressive atelectasis, similar to prior. Small pericardial effusion.  6. Faint patchy ground glass opacities in the right middle lobe and  and right upper lobe, likely infectious/inflammatory.      CT AP w/ contrast   IMPRESSION:   1.  Sequela of necrotizing pancreatitis with peripancreatic necrotic  fluid collection, large volume abdominal ascites and mesenteric edema.  2.  Thickened, edematous small bowel wall is likely due to reactive  enteritis secondary to pancreatitis.  3.   Hepatomegaly with steatosis and mild surface nodularity,  associated with splenomegaly and recanalized paraumbilical vein,  suggestive of cirrhosis with portal hypertension.  4.  Enlarged hypoenhancing kidneys with loss of corticomedullary  differentiation, suggestive of acute renal parenchymal disease. No  hydronephrosis or perinephric fat stranding.  5.  Small right and trace left pleural effusions, interlobular septal  thickening, and diffuse anasarca, likely related to fluid third  spacing.

## 2021-07-15 NOTE — PROGRESS NOTES
[facility]    Progress Note - Daniela 3 Service        Date of Admission:  6/28/2021    Assessment & Plan   Dax Villareal is a 30 year old male with hx of alcohol use disorder admitted on 6/28/2021 after recent prolonged admission at [facility] for acute alcoholic hepatitis who has ESLD c/b HE, ARF requiring HD, and SBP and malnutrition in the setting of acute necrotizing pancreatitis, s/p PEG-J placement on 7/13. He arrived with acute respiratory failure and septic shock that have resolved, and his SBP has been appropriately treated. He requires continued management for ARF, hepatic encephalopathy, infected pancreatic pseudocyst, and nutritional support via PEG-J.      Changes today:   - Advancing Tube Feeds, on Phos standard replacement protocol  - Care conference today, plan made to meet Fridays at 4 pm with teams and family for weekly care conferences     #Necrotizing pancreatitis w/ infected pseudocyst s/p RP Drain, improving  #Septic Shock, resolved   #SBP, resolved  #Leukocytosis, improving  Etiology of the septic shock that he came in with likely due to necrotizing pancreatitis, infected pseudocyst, and SBP. Had percutaneous RP drain placed for drainage of pancreatic collection. Diagnostic para returned with SBP. Repeat CT 7/11 showed significant decrease in size of walled off necrosis and unchanged moderate ascites. S/p therapeutic paracentesis 7/13 (1L removed).   - Continue meropenem 500 mg q24h (Total 21 day course - Through 7/19).  - Daily CBC  - MAP goal > 65   - GI consulted, appreciate recs   - For back pain: Tylenol PRN and Dilaudid 1-2mg q4h PRN  - Will need cipro ppx for SBP after discontinuing Meropenem     # Severe malnutrition in setting of chronic illness  PEG-J placed 7/13. Procedure went well, patient working up to TF goal of 65 mL/hr, currently on 40 mL/hr, FWF 30 mL q4h. Will keep G tube open/vented. Patient also has increasing PO diet  d/t increased appetite and decreased nausea.  - Dietitian consult for new tube feeds, appreciate recs              - TF 65 mL/hr, FWF 30 mL q4h  - Regular Diet, thin liquids per SLP  - Continue multivitamins  - Pantoprazole 40 mg IV daily  - Zofran PRN     # Decompensated EtOH cirrhosis c/b hepatic encephalopathy and recurrent ascites  # Hx of Alcohol use disorder c/b withdrawal   # Anemia  # Coagulopathy   Patient with a history of marked alcohol use and alcoholic hepatitis in the past. Was given a course of steroids (1 week) during previous admission at OSH. Patient mental status has been stable, alert and oriented x4 for a week, well managed on lactulose regimen. S/p pRBC infusion x2 over the past few days d/t a drop to 6.7 on 7/13 (etilogy likely chronic liver disease, chronic kidney disease, phlebotomy, dilution, poor nutrition, ect), Hgb now stable at 8.0.   - Lactulose 20g QID and PRN and rifaximin 550mg BID (titrate to 1.5L stool/day)  - Trend MELD labs  - Daily folic acid and thiamine  - Delirium precautions  - Melatonin 6mg at bedtime  - Avoid benzos and opioids as able  - Monitor hemoglobin  - Transfuse if hemoglobin < 7, platelet < 10 or < 30 with bleeding  - Continue to monitor for signs of bleeding     MELD-Na score: 30 at 7/15/2021  4:43 AM  MELD score: 28 at 7/15/2021  4:43 AM  Calculated from:  Serum Creatinine: 2.30 mg/dL at 7/15/2021  4:43 AM  Serum Sodium: 131 mmol/L at 7/15/2021  4:43 AM  Total Bilirubin: 9.3 mg/dL at 7/15/2021  4:43 AM  INR(ratio): 1.60 at 7/15/2021  4:43 AM  Age: 30 years    # ARF on HD (MWF)  # Hypervolemia causing acute respiratory failure, resolved  - Nephrology consulted, recs appreciated     > Hemodialysis MWF              - Midodrine 10 mg PRN before dialysis sessions     > Daily chemistry labs, strict I/O, and daily weights  - Aspiration precautions  - Supplemental oxygen as needed to keep sats above 89%     # Goals of Care  Please see additional interval note for in  depth details regarding conversation on 7/9 about goals of care. Patient's long term prognosis is still quite poor given his multi organ failure. Care conference on 7/15, pt did not wish to participate. Met with sisters Noni (on phone) and Melyssa and close friend Leroy with palliative, nephrology and general medicine teams today to discuss Dax's current improving status, poor prognosis and wishes to continue full cares. Plan to meet Fridays at 4 pm with teams and family for weekly care conferences.     Diet: Snacks/Supplements Adult: Ensure Clear; With Meals  Adult Formula Drip Feeding: Continuous Vital 1.5; Jejunostomy; Goal Rate: 65; mL/hr; Medication - Feeding Tube Flush Frequency: At least 15-30 mL water before and after medication administration and with tube clogging; Amount to Send (Nutrition us...  2 Gram Sodium Diet    DVT Prophylaxis: Pneumatic Compression Devices  Rdz Catheter: Not present  Central Lines: PRESENT  PICC Triple Lumen 06/28/21 Left-Site Assessment: WDL  CVC Double Lumen Right-Site Assessment: WDL  Code Status: Full Code      Disposition Plan   Expected discharge: >7 days, recommended to transitional care unit once mental status at baseline, renal function and nutrition improved and necrotizing pancreatitis is well managed.     The patient's care was discussed with the Attending Physician, Dr. Baker.    Jaime Amin, MS3  Medical Student  [address]  Securely message with the Vocera Web Console (learn more here)  Text page via [employer] Paging/Directory  Please see sign in/sign out for up to date coverage information    Resident/Fellow Attestation   I, Rosa Elena Shore, was present with the medical/NAHUM student who participated in the service and in the documentation of the note.  I have verified the history and personally performed the physical exam and medical decision making.  I agree with the assessment and plan of care as  "documented in the note.      Rosa Elena Shore MD  Medicine-Pediatrics, PGY-1  [phone]  _______________________________________________________________    Interval History   Overnight, RRT called, lactic 2.7. Given Albumin with improvement.    This morning, patient is feeling well; he feels like he's \"starting to heal.\" He is able to eat more due to decreased nausea and increased appetite, despite his PEG-J site still being tender.     Data reviewed today: I reviewed all medications, new labs and imaging results over the last 24 hours.    Physical Exam   Vital Signs: Temp: 99.1  F (37.3  C) Temp src: Axillary BP: 124/59 Pulse: (!) 132   Resp: 18 SpO2: 97 % O2 Device: None (Room air)    Weight: 162 lbs .61 oz    Gen: Lying down in bed, awake. Appears comfortable, similar mental status as the past week. Seems to be in a particularly good mood.  HEENT: Conjunctival icterus  CV: Tachycardic. Regular rhythm.   Resp: Breathing comfortably on RA  Abd: Soft, distended, epigastric area tender to palpation, especially around PEG-J site. Lower abdomen under umbilicus also tender to palpation. PEG-J insertion site looks clean, non-erythematous.   Skin: Jaundiced. Itchy, dry skin on lower back.  Back: Left flank RP drain with maroon/brown OP. No tenderness around tube insertion site  Ext: Well perfused, no edema  Neuro: Alert and oriented x4. Able to communicate needs well.    Data   Recent Labs   Lab 07/15/21  0811 07/15/21  0443 07/14/21  2240 07/14/21  0345 07/14/21  0343 07/14/21  0342 07/13/21  0718   WBC  --  20.3*  --   --   --  17.7* 21.3*   HGB  --  8.0* 7.9*  --   --  6.9*  6.9* 6.7*   MCV  --  96  --   --   --  96 97   PLT  --  219  --   --   --  213 211   INR  --  1.60*  --   --  1.75*  --  1.78*   NA  --  131*  --  130*  --   --  131*   POTASSIUM  --  3.3*  --  4.2  --   --  3.6   CHLORIDE  --  98  --  98  --   --  98   CO2  --  23  --  20  --   --  23   BUN  --  26  --  42*  --   --  29   CR  --  2.30*  --  " 3.35*  --   --  2.60*   ANIONGAP  --  10  --  12  --   --  10   JANENE  --  8.1*  --  8.6  --   --  8.5   * 131*  --  137*  --   --  94   ALBUMIN  --  2.7*  --  2.6*  --   --  2.6*   PROTTOTAL  --  7.6  --  7.4  --   --  6.9   BILITOTAL  --  9.3*  --  11.1*  --   --  13.9*   ALKPHOS  --  182*  --  174*  --   --  167*   ALT  --  16  --  12  --   --  15   AST  --  86*  --  54*  --   --  60*     No results found for this or any previous visit (from the past 24 hour(s)).

## 2021-07-15 NOTE — PROVIDER NOTIFICATION
Time of notification: 5:00 PM  Provider notified: Marcelino Desai MD  Patient status: Tachycardic, HR sustaining 130's. Asymptomatic, VSS  Temp:  [97.9  F (36.6  C)-99.1  F (37.3  C)] 99.1  F (37.3  C)  Pulse:  [106-132] 132  Resp:  [16-20] 18  BP: ()/(44-65) 124/59  SpO2:  [95 %-99 %] 97 %  Orders received: Continue to monitor, notify MD if HR sustaining in 140's

## 2021-07-15 NOTE — PLAN OF CARE
Neuro: A&Ox4, jaundiced.   Cardiac: SR/ST , MD aware. 's/50's. Tmax 100.8     Respiratory: Sating >95% on RA.  GI/: Anuric, multiple loose BM this shift. Refusing afternoon lactulose, will continue to encourage med compliance.  Diet/appetite: tolerating low sodium diet, fair appetite. TF increased to 60cc/hr, orders to increase every 6-8hrs to goal of 65cc/hr. Tolerating well  Activity:  Assist of 1-2 for bed mobility. Up to chair for 2 hours today, lift assist for out of bed.  Pain: At acceptable level on current regimen.   Skin: No new deficits noted.  LDA's: LUE PICC, right chest HD line, G tube to gravity drain, J tube with continuous feeding, left perc drain to gravity. Grey port of PICC occluded, TPA ineffective, chest Xray confirmed placement, troubleshoot by vascular nurse also unsuccessful. Per vasc nurse, OK to continue to use functioning lumen for meds/lab draw.     Plan: Family care conference held today, pt chose not to participate. Triggered Sepsis BPA, awaiting lactic acid results. Continue with POC. Notify primary team with changes.

## 2021-07-15 NOTE — CONSULTS
Met with patient and friend at the bedside for drainage tube education. Demonstrated cleaning around tube site and doing a dressing change, securing tube, emptying bag and flushing with saline. Patient and friend verbalized understanding.    Literature given: Handwashing and Skin Care, Drainage Tube Home Care Instructions, Flushing Your Drain with Saline.

## 2021-07-15 NOTE — PROVIDER NOTIFICATION
Time of notification: 8:39 PM  Provider notified:Paged primary team and RRT team   Patient status:Pt triggered sepsis.  Lactic lab 2.7.  RRT called.  Paged primary team with results.  Temp:  [97.6  F (36.4  C)-98.5  F (36.9  C)] 97.9  F (36.6  C)  Pulse:  [] 108  Resp:  [11-20] 18  BP: ()/(46-70) 105/52  SpO2:  [90 %-99 %] 96 %  Orders received: RRT NP  Ordered albumin 25 G.  Hgb recheck.

## 2021-07-15 NOTE — PROGRESS NOTES
"SPIRITUAL HEALTH SERVICES  SPIRITUAL ASSESSMENT Progress Note (Palliative Focus)  [facility] 6B    REFERRAL SOURCE: Care conference/staff referral.    Care conference with patient Dax Villareal's sister Melyssa and friend Leroy in person and sister Noni via telephone. Dax requested that care conference be held outside his room.    Family received news of prognosis based on MELD score and continue to name restorative goals for Dax. They are clear that he wants to \"keep fighting\" and they want to support him in his desire to recover. At the same time, family are aware of Dax's poor long-term prognosis.    Family are most concerned about finding Dax a supportive environment for recovery. They also want to stay informed in order to support him in the best way they can. They requested weekly care conferences if possible.    Plan: I will follow for spiritual support while Palliative Care is consulted.      Jody Nagy  Palliative   Pager [phone]  [facility] Inpatient Team Consult pager [phone] (M-F 8-4:30)  After-hours Answering Service [phone]    "

## 2021-07-15 NOTE — PROGRESS NOTES
Nephrology Progress Note  07/15/2021         Dax Villareal is a 30 year old male with h/o ETOH hepatitis, end stage liver disease, RADHA on HD, transferred from [facility] in [place] for septic shock on 6/28/21 for treatment of necrotizing pancreatitis and decompensated liver disease. He was initially admitted to [facility] 5/19/21.        Interval History :   Mr Villareal had HD yesterday, pulled 1L of UF.  No signs of renal recovery, per family RRT was started in [place] in the end of May so we are now ~6 weeks into RRT.  We had a care conference to update family and path going forward.  Will continue restorative cares with the knowledge that Mr Villareal is at high risk for complications (mainly bleeding and infections) and re-hospitalizations.  He has reasonable enough BP's and functional potential to keep on this path for now.        Assessment & Recommendations:   RADHA-Cr was 0.8 as recently as 5/19/2021 but now HD dependent, started RRT at [facility] prior to transfer to [facility] (exact date unclear but per family it was end of May).  Etiology likely multifactorial with contrast, sepsis/pancreatitis, likely HRS physiology contributing as well.  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  Continuing RRT as long as it is tolerated with regards to BP's, is functional enough to possibly do outpt HD.  MELD is in high 30's so has high 3 month mortality but continuing restorative cares for now.                -Line is TDC from PTA               -Holding on run today, plan for HD tomorrow.        Volume status-Anuric, no major edema on exam, wt is down nearly 10kg since admission.       Electrolytes/pH-K 3.3, bicarb 23, no acute issues.      Ca/phos/pth-Ca 8.1, Mg and Phos last checked 7/7, ordered for tomorrow.       Anemia-Hgb 8.0, acute management per team, last PRBC's 7/10.       Nutrition-Taking PO, appetite ok.       Discussed with Dr Reid     Recommendations were communicated to primary team via verbal  "communication.           SOFIA Marlow CNS  Clinical Nurse Specialist  [phone]    Review of Systems:   I reviewed the following systems:  Gen: No fevers or chills  CV: No CP at rest  Resp: No SOB at rest  GI: No N/V    Physical Exam:   I/O last 3 completed shifts:  In: 2100 [P.O.:850; Other:90; NG/GT:460]  Out: 2305 [Emesis/NG output:1025; Drains:280; Other:1000]   /62 (BP Location: Right arm)   Pulse (!) 132   Temp 98.6  F (37  C) (Oral)   Resp 20   Ht 1.676 m (5' 6\")   Wt 73.5 kg (162 lb 0.6 oz)   SpO2 96%   BMI 26.15 kg/m       GENERAL APPEARANCE: Seen on HD, in no distress.    EYES:  scleral icterus, pupils equal  Pulmonary: lungs clear to auscultation. Off supplemental oxygen   CV: tachy   - Edema - no LE edema  GI: large distended, tender. Has retroperitoneal drain  MS: no evidence of inflammation in joints, no muscle tenderness  SKIN: no rash, warm, dry, no cyanosis  NEURO: alert/interactive  ACCESS: Right TDC     Labs:   All labs reviewed by me  Electrolytes/Renal - Recent Labs   Lab Test 07/15/21  0811 07/15/21  0443 07/14/21  0345 07/13/21  0718   NA  --  131* 130* 131*   POTASSIUM  --  3.3* 4.2 3.6   CHLORIDE  --  98 98 98   CO2  --  23 20 23   BUN  --  26 42* 29   CR  --  2.30* 3.35* 2.60*   * 131* 137* 94   JANENE  --  8.1* 8.6 8.5   MAG  --  1.6 1.6 1.5*   PHOS  --  3.0 4.6* 4.3       CBC -   Recent Labs   Lab Test 07/15/21  0443 07/14/21  2240 07/14/21 0342 07/13/21  0718   WBC 20.3*  --  17.7* 21.3*   HGB 8.0* 7.9* 6.9*  6.9* 6.7*     --  213 211       LFTs -   Recent Labs   Lab Test 07/15/21  0443 07/14/21  0345 07/13/21  0718   ALKPHOS 182* 174* 167*   BILITOTAL 9.3* 11.1* 13.9*   ALT 16 12 15   AST 86* 54* 60*   PROTTOTAL 7.6 7.4 6.9   ALBUMIN 2.7* 2.6* 2.6*       Iron Panel - No lab results found.        Current Medications:    B and C vitamin Complex with folic acid  5 mL Per Feeding Tube Daily     folic acid  1 mg Oral Daily    Or     folic acid  1 mg " Intravenous Daily     lactulose  20 g Oral or NG Tube 4x Daily     melatonin  6 mg Oral At Bedtime     meropenem  500 mg Intravenous Q24H     pantoprazole  40 mg Oral QAM AC     potassium chloride  20 mEq Oral BID     rifaximin  550 mg Oral BID     sodium chloride (PF)  10 mL Irrigation Q8H     sodium chloride (PF)  3 mL Intracatheter Q8H     vitamin B1  100 mg Oral Daily    Or     thiamine  100 mg Intravenous Daily       - MEDICATION INSTRUCTIONS -       dextrose

## 2021-07-15 NOTE — PROGRESS NOTES
GASTROENTEROLOGY PROGRESS NOTE    Date of Admission: 6/28/2021  Reason for Admission: pancreatitis/hepatitis      ASSESSMENT/RECOMMENDATIONS:  30 year old male with a history of heavy alcohol use who presented to OSH 5/19 for abdominal pain, nausea and vomiting, admitted for alcohol hepatitis, ESLD, RADHA on HD, septic shock requiring pressors as well as necrotizing pancreatitis with large evolving necrotic collection. Transferred to [facility] for consideration of drainage of presumed infected necrotic collection     #. Acute necrotizing pancreatitis with presumed infected walled off necrotic collection s/p percutaneous drainage  #. Septic shock  #. Leukocytosis  #. Ascites/Peritonitis   Unclear evolution of pancreatitis history but had a recent CT scan with large evolving necrotic collection, presumed infected. No obvious gas in collection. CT on admission to [facility] showing mature collection with enhancing wall measuring ~[id number]. With profound coagulopathy (liver disease + malnutrition +sepsis), INR 3.8 on transfer he was given FFP and Vit K, IR placed perc drain 6/29. He may eventually warrant endoscopic drainage however repeat imaging (7/11) shows that collection is largely decompressed and no endoscopic window at this point. Hold off on endoscopic drainage  Etiology: alcohol  Date of onset: 5/19  BISAP score on admission: unclear  Concurrent organ failure: respiratory (now extubated), renal (on HD), liver, cardiogenic (weaned off pressors 6/29)  Thromboses: none  Diabetes: no  Current nutrition access: PEG-J  Last imaging: CT scan without IV contrast 7/11  Infection/antiinfectives (cultures negative so far):                 - Meropenem (6/29 - present)                - Vancomycin (6/29 - 6/30)     - Micafungin (6/29 - 7/1)   Intervention:    6/30 - Left IR RP perc drain (24F)    7/2 - Diagnostic paracentesis (WBC 2663 - 74% PMN, SAAG <1.1, protein 4.1)     7/5 - Repeat Paracentesis (WBC 1801 - 61% PMN, lipase  "48)    7/13 - Diag/therapeutic paracentesis (, 20% PMN)    7/13 - PEG-J placement with gastropexy                7/14- Left IR RP per drain exchanged (24F)    -Defer endoscopic drainage for now  -Drain management per IR   - ID recommending to continue antibiotic coverage (either with meropenem or cipro/flagyl) for additional 4 days     #. Severe malnutrition in the context of chronic illness  #. S/p PEG-J placement  Patient with very poor oral intake over the course of his illness (almost 2 months) and is not meeting calorie requirements orally. S/p PEG-J with T-tag gastropexy (x3) 7/13.  -Advance tube feeds as tolerated  -Consider starting PERT, fecal elastase not checked but likely to be artificially low given loose stools  -Oral diet as tolerated  -T-tags (white buttons) should be removed 2 weeks following procedure (7/27) by lifting the button and cutting the underlying blue suture. Often times the buttons fall off prior to the 2 week louis.     #. Alcohol hepatitis  #. Hyperbilirubinemia  #. Coagulopathy/Thrombocytopenia  #. Encephalopathy  Known heavy alcohol use. Reportedly received course of steroids at OSH for 1 week with \"some improvement\". Currently has primarily hyperbilirubinemia and mildly elevated AST. Unclear if he has underlying cirrhosis but certainly would fit with alcohol hepatitis vs elevated liver tests of critical illness/sepsis. Not candidate for steroids right now. He is receiving lactulose and Xifaxin for encephalopathy but having a lot of stool output. Hepatology notified of admission, ongoing discussions with them. Not currently liver transplant candidate.  -Trend MELD labs  -Continue lactulose and titrate to 3 soft stools per day -- please record stool output  -Alcohol abstinence    This patient has a very poor overall prognosis and agree with palliative care consultation and GOC discussions with the family    The patient was discussed and plan agreed upon with GI staff, Dr" "Ginna Antony M.D.   GI Fellow  [phone]  _______________________________________________________________      Subjective:   NAEO; patient reporting diffuse abdominal pain and back pain; denied nausea, is tolerating a diet     ROS:   4 pt ROS negative unless noted in subjective.     Objective:  Blood pressure 108/44, pulse 117, temperature 98.5  F (36.9  C), temperature source Oral, resp. rate 16, height 1.676 m (5' 6\"), weight 73.5 kg (162 lb 0.6 oz), SpO2 95 %.  Constitutional: NAD, on RA  Eyes: conjunctiva icteric  CV: No edema  Respiratory: Unlabored breathing  Abd: nondistended, tender, no peritoneal signs; PEG-J; L RP drain   Skin: warm, perfused, jaundice  Neuro: AAO x 3, fluent speech   Psych: Normal affect  MSK: No gross deformities    LABS:  BMP  Recent Labs   Lab 07/15/21  0811 07/15/21  0443 07/14/21  0345 07/13/21  1050 07/13/21  0718 07/12/21  2105   NA  --  131* 130*  --  131* 130*   POTASSIUM  --  3.3* 4.2  --  3.6 3.7   CHLORIDE  --  98 98  --  98 97   JANENE  --  8.1* 8.6  --  8.5 8.6   CO2  --  23 20  --  23 21   BUN  --  26 42*  --  29 21   CR  --  2.30* 3.35*  --  2.60* 2.06*   * 131* 137* 101* 94 91     CBC  Recent Labs   Lab 07/15/21  0443 07/14/21  0342 07/13/21  0718 07/12/21  2105   WBC 20.3* 17.7* 21.3* 24.6*   RBC 2.51* 2.15* 2.10* 2.22*   HGB 8.0* 6.9*  6.9* 6.7* 7.4*   HCT 24.2* 20.7* 20.4* 21.3*   MCV 96 96 97 96   MCH 31.9 32.1 31.9 33.3*   MCHC 33.1 33.3 32.8 34.7   RDW 22.2* 20.9* 21.2* 20.7*    213 211 214     INR  Recent Labs   Lab 07/15/21  0443 07/14/21  0343 07/13/21 0718 07/12/21  2105   INR 1.60* 1.75* 1.78* 1.83*     LFTs  Recent Labs   Lab 07/15/21  0443 07/14/21  0345 07/13/21  0718 07/12/21  2105   ALKPHOS 182* 174* 167* 169*   AST 86* 54* 60* 60*   ALT 16 12 15 17   BILITOTAL 9.3* 11.1* 13.9* 14.6*   PROTTOTAL 7.6 7.4 6.9 7.1   ALBUMIN 2.7* 2.6* 2.6* 2.7*      PANC  No lab results found in last 7 days.      IMAGING:  EXAMINATION: CT " ABDOMEN PELVIS W/O CONTRAST, 7/11/2021 11:21 AM                                                                   IMPRESSION:   1. Sequela of necrotizing pancreatitis with highly decreased size of  walled off necrosis centered in the lesser sac. Percutaneous drain  remains in place.  2. Loculated moderate ascites is unchanged.  3. Hepatomegaly, hepatic steatosis, and evidence of portal  hypertension again seen.  4. Small bilateral pleural effusions are slightly improved.     no

## 2021-07-15 NOTE — PROGRESS NOTES
[facility] Nurse Inpatient Wound Assessment   Reason for consultation: Evaluate and treat  buttock wounds    Assessment  Incontinence associated dermatitis in  cleft with partial thickness open area     Status: stable, healing    Treatment Plan  Perineal cares:    -cleanse with Samson Cleanse and Protect All-in-One Protectant Lotion (this is an all in one cleanser, moisturizer, and barrier ointment).  -Apply THIN layer of Critic-Aid Thick Moisture Barrier Paste to all skin that is in contact with stool or urine.  -With repeat cleansings, DO NOT scrub Critic-Aid down to skin, just gently remove surface incontinence soil and reapply additional Critic Aid, if needed.  -If complete removal of Critic-Aid is required, use a warm wash cloth and Samson cleanser: place the warm wash cloth to the area for a minute and then cleanse, or use mineral oil/baby oil and soft disposable wash cloth.  .    Avoid brief or pull-up in bed, use only bed underpad.      Orders Reviewed  WO Nurse follow-up plan:weekly  Nursing to notify the Provider(s) and re-consult the [facility] Nurse if wound(s) deteriorates or new skin concern.    Patient History  According to provider note(s):  30 year old male with PMHx significant for alcohol use disorder who has had a prolonged hospitalization at [facility] in [place] for acute alcohol hepatitis/end stage liver disease complicated by hepatic encephalopathy, acute renal failure requiring hemodialysis, acute hypoxemic respiratory failure and septic shock secondary to acute necrotizing pancreatitis. Transferred to [facility] ICU on 2021 for further management of necrotizing pancreatitis by interventional GI, possible necrosectomy.    Objective Data  Containment of urine/stool: Incontinence Protocol and Incontinent pad in bed    Active Diet Order  Orders Placed This Encounter      2 Gram Sodium Diet      Output:   I/O last 3 completed shifts:  In: 2100 [P.O.:850; Other:90; NG/GT:460]  Out: 2305 [Emesis/NG  output:1025; Drains:280; Other:1000]    Risk Assessment:   Sensory Perception: 4-->no impairment  Moisture: 3-->occasionally moist  Activity: 1-->bedfast  Mobility: 3-->slightly limited  Nutrition: 2-->probably inadequate  Friction and Shear: 2-->potential problem  Tyrel Score: 15                          Labs:   Recent Labs   Lab 07/15/21  0443   ALBUMIN 2.7*   HGB 8.0*   INR 1.60*   WBC 20.3*       Physical Exam  Areas of skin assessed: focused buttocks     Wound Location:  Base of  cleft, inferior to coccyx        Date of last photo 21  Wound History: present on admission.  Pt has been receiving lactulose, previously with rectal tube with balloon.      Wound Base: 100 % dermis     Palpation of the wound bed: normal      Drainage: none     Description of drainage: none     Measurements (length x width x depth, in cm) 0.5  x 0.1  x  0.1 cm   Periwound skin: erythema- blanchable      Temperature: normal   Odor: none  Pain: no grimacing or signs of discomfort,     Interventions  Visual inspection and assessment completed   Wound Care Rationale Provide protection   Wound Care: done per plan of care  Supplies: gathered  Current off-loading measures: Pillows  Current support surface: Standard  Atmos Air mattress  Discussed plan of care with Nurse

## 2021-07-15 NOTE — PROVIDER NOTIFICATION
Time of notification: 1:36 PM  Provider notified: Jaime Amin  Patient status:  Pt increasingly tachycardic, HR now sustaining in 120's, other VSS. Also, grey port of PICC line occluded, TPA attempted x 2 but unsuccessful, requested CXR per PICC nurse recommendation  Temp:  [97.6  F (36.4  C)-98.9  F (37.2  C)] 98.6  F (37  C)  Pulse:  [] 132  Resp:  [14-20] 20  BP: ()/(44-70) 105/62  SpO2:  [95 %-99 %] 96 %  Orders received: Continue to monitor HR. MD will order CXR for PICC placement.

## 2021-07-15 NOTE — PROGRESS NOTES
[facility]  Palliative Care Daily Progress Note       Recommendations & Counseling     Participated in CC today with primary team, renal, sisters Melyssa and Noni, and close friend Leroy.  We discussed that it is looking more likely, but is not yet certain, that he will require long-term dialysis.  Discussed that if he is still needing dialysis at the 3-month point (he is now currently about correction there) it will most likely be a permanent need.  Discussed that he remains willing to bear the burden of current care, even with uncertainty of a desired outcome.  Reviewed his recent meld scores and the relatively high 3-month mortality rate related to this.  I will speak with our palliative care  and palliative care  regarding ongoing support for family and Sister Noni.    Total time spent was 41 minutes,  >50% of time was spent counseling and/or coordination of care regarding support with coping, family support.  [id number]  Zoltan Arenas MD  Palliative Medicine Consult Team  Pager: [phone]        Assessments          Dax Villareal is a 30 year old male with PMH that includes EtOH hepatitis, ESLD with MELD-Na of 40 today, RADHA now dialysis-dependent. Pt was transferred to [facility] from [facility] in [place] on 6/28/21 for further management of necrotizing pancreatitis with presumed infected wall off necrotic collection, decompensated liver disease and septic shock. Most recent imaging noting peripancreatic fluid collection largely decompressed without window for intervention at this time per GI note. Pt with difficulty maintaining nutrition, initially had stated that he does not want artificial nutrition, but now has PEG.     Today, the patient was seen for:  Acute necrotizing pancreatitis with presumed infected wall off necrotic collection  Alcohol hepatitis  ESLD   Acute kidney injury now dialysis-dependent  patient and family support  goals of care    Prognosis,  "Goals, or Advance Care Planning was addressed today with: Yes.  Mood, coping, and/or meaning in the context of serious illness were addressed today: Yes.  Summary/Comments: Relies on family; see comments above            Interval History:     We are seeing for pt/family support/GOC; we are not managing symptoms.    Up in chair, family and friends all think he \"looks a lot stronger\"  Intermittent abdominal pain managed with prn opioids  He declined participation in CC today, preferring we \"just speak with his family\"  Expresses interest in \"doing all we can, there's a lot the docs can do for me\"           Review of Systems:     Denies NV, anxiety, depression, cough. + intermittent abdominal pain          Medications:     I have reviewed this patient's medication profile and medications during this hospitalization.  Dilaudid 1-2 mg up to q 4 hours prn           Physical Exam:   Vitals were reviewed  Temp: 98.6  F (37  C) Temp src: Oral BP: 105/62 Pulse: (!) 132   Resp: 20 SpO2: 96 % O2 Device: None (Room air) Oxygen Delivery: 2 LPM  GEN: awake, alert, up in chair, appears comfortable, no increased WOB,   CV: Regular radial pulse 104  ABD: Moderate distension  Skin: + Jaundice           Data Reviewed:     ROUTINE ICU LABS (Last four results)  CMPRecent Labs   Lab 07/16/21  0616 07/15/21  0811 07/15/21  0443 07/14/21  0345 07/13/21  0718 07/12/21  2105 07/12/21  2105 07/11/21  0607 07/10/21  0625 07/10/21  0625   *  130*  --  131* 130* 131*  --  130* 126*   < > 131*   POTASSIUM 4.0  --  3.3* 4.2 3.6  --  3.7 4.6   < > 3.9   CHLORIDE 99  --  98 98 98  --  97 96   < > 99   CO2 20  --  23 20 23  --  21 20   < > 22   ANIONGAP 11  --  10 12 10  --  12 10   < > 10   * 126* 131* 137* 94   < > 91 95   < > 105*   BUN 39*  --  26 42* 29  --  21 41*   < > 24   CR 3.23*  --  2.30* 3.35* 2.60*  --  2.06* 3.20*   < > 2.21*   GFRESTIMATED 24*  --  36* 23* 31*  --  42* 24*   < > 38*   GFRESTBLACK  --   --   --   --   --   " --   --  28*  --  44*   JANENE 7.8*  --  8.1* 8.6 8.5  --  8.6 8.3*   < > 8.4*   MAG  --   --  1.6 1.6 1.5*  --   --   --   --   --    PHOS 2.0*  --  3.0 4.6* 4.3  --  3.4  --    < >  --    PROTTOTAL  --   --  7.6 7.4 6.9  --  7.1 7.5  --  7.8   ALBUMIN  --   --  2.7* 2.6* 2.6*  --  2.7* 2.8*  --  3.1*   BILITOTAL  --   --  9.3* 11.1* 13.9*  --  14.6* 17.3*  --  17.7*   ALKPHOS  --   --  182* 174* 167*  --  169* 177*  --  187*   AST  --   --  86* 54* 60*  --  60* 79*  --  108*   ALT  --   --  16 12 15  --  17 21  --  24    < > = values in this interval not displayed.     CBC  Recent Labs   Lab 07/16/21  0616 07/15/21  0443 07/14/21  2240 07/14/21  0342 07/13/21  0718   WBC 26.7* 20.3*  --  17.7* 21.3*   RBC 2.38* 2.51*  --  2.15* 2.10*   HGB 7.5* 8.0* 7.9* 6.9*  6.9* 6.7*   HCT 23.0* 24.2*  --  20.7* 20.4*   MCV 97 96  --  96 97   MCH 31.5 31.9  --  32.1 31.9   MCHC 32.6 33.1  --  33.3 32.8   RDW 21.2* 22.2*  --  20.9* 21.2*    219  --  213 211     INR  Recent Labs   Lab 07/16/21  0616 07/15/21  0443 07/14/21  0343 07/13/21  0718   INR 1.77* 1.60* 1.75* 1.78*     Arterial Blood GasNo lab results found in last 7 days.

## 2021-07-15 NOTE — PROVIDER NOTIFICATION
07/14/21 2000   Call Information   Date of Call 07/14/21   Time of Call 2009   Name of person requesting the team Doug   Title of person requesting team RN   RRT Arrival time 2010   Time RRT ended 2021   Reason for call   Type of RRT Adult   Primary reason for call Sepsis suspected   Sepsis Suspected Elevated Lactate level;Heart Rate > 100   Was patient transferred from the ED, ICU, or PACU within last 24 hours prior to RRT call? No   SBAR   Situation LA 2.7   Background ETOH, Liver   Notable History/Conditions End-Stage disease;Organ failure   Assessment alert, lethargic, afeb, tachycardic, BP stable, had HD today   Interventions Meds  (albumin)   Patient Outcome   Patient Outcome Stabilized on unit   RRT Team   Attending/Primary/Covering Physician Daniela 3   Date Attending Physician notified 07/14/21   Time Attending Physician notified 2009   Physician(s) Carin Camacho

## 2021-07-15 NOTE — CODE/RAPID RESPONSE
Rapid Response Team Note    Assessment   In assessment a rapid response was called on Dax Villareal due to lactic acidosis. This presentation is likely due to liver failure and worsened by ESRD on HD.     Plan   -  25g albumin x 1  -  Recheck Hgb  -  The Daniela primary team was paged and currently awaiting a response.  -  Disposition: The patient will remain on the current unit. We will continue to monitor this patient closely.  -  Reassessment and plan follow-up will be performed by the primary team    SOFIA Peraza CNP  [facility] RRT AMCOM Job Code Contact #[phone]    Hospital Course   Brief Summary of events leading to rapid response:    Dax triggered lactic acid/sepsis protocol due to tachycardia, leukocytosis.   Already on meropenem.   Did receive 1 unit PRBCs today during dialysis. Needs a recheck Hgb and will give some albumin.  Recheck lactic acid later this evening.    Admission Diagnosis:   Pancreatitis [K85.90]    Physical Exam   Temp: 97.9  F (36.6  C) Temp  Min: 97.6  F (36.4  C)  Max: 98.5  F (36.9  C)  Resp: 18 Resp  Min: 11  Max: 20  SpO2: 96 % SpO2  Min: 90 %  Max: 99 %  Pulse: 108 Pulse  Min: 90  Max: 110    No data recorded  BP: 105/52 Systolic (24hrs), Av , Min:86 , Max:113   Diastolic (24hrs), Av, Min:46, Max:70     I/Os: I/O last 3 completed shifts:  In: 680 [P.O.:120; Other:30; NG/GT:150]  Out:  [Emesis/NG output:850; Drains:185; Other:1000]     Exam:   General: acutely ill appearing  Mental Status: altered level of consciousness based on somnolence.  LS: CTAB  CV: tachycardia, regular    Significant Results and Procedures   Lactic Acid:   Recent Labs   Lab Test 21  1945 21  0718 21  210   LACT 2.7* 0.9 1.9   < >  --   --   --    LACTS  --   --   --   --  1.1 0.9 1.5    < > = values in this interval not displayed.     CBC:   Recent Labs   Lab Test 21  0342 21  0718  07/12/21  2105   WBC 17.7* 21.3* 24.6*   HGB 6.9*  6.9* 6.7* 7.4*   HCT 20.7* 20.4* 21.3*    211 214      Sepsis Evaluation   The patient is known to have an infection.  NO EVIDENCE OF SEPSIS at this time.  Vital sign, physical exam, and lab findings are due to liver disease.

## 2021-07-15 NOTE — PLAN OF CARE
"/58 (BP Location: Right arm)   Pulse 114   Temp 98.9  F (37.2  C) (Oral)   Resp 18   Ht 1.676 m (5' 6\")   Wt 73.5 kg (162 lb 0.6 oz)   SpO2 96%   BMI 26.15 kg/m        Neuro: A&Ox4.   Forgetful at times and will make repeated requests.  Calls as needed.    Cardiac: SR/ST. VSS.   Respiratory: Sating 94%> on RA.  GI/: Pt is anuric and dialysis through rt CVC line M/W/F.  Multiple loose stools over night.  Diet/appetite: Tolerating 2 G NA+ diet pt is on TF at 40 ml/hr and FWF at 30 ml/q4hrs. Eating well.  Activity:  Assist of 2, up to chair and in halls.  Pain: At acceptable level on current regimen.   Skin: No new deficits noted.  LDA's:PICC, CVC, left perc tube, GJ peg tube    Plan: Continue with POC. Notify primary team with changes.    "

## 2021-07-16 ENCOUNTER — APPOINTMENT (OUTPATIENT)
Dept: GENERAL RADIOLOGY | Facility: CLINIC | Age: 31
End: 2021-07-16
Attending: INTERNAL MEDICINE
Payer: MEDICAID

## 2021-07-16 LAB
ALBUMIN FLD-MCNC: 1.6 G/DL
ANION GAP SERPL CALCULATED.3IONS-SCNC: 11 MMOL/L (ref 3–14)
APPEARANCE FLD: ABNORMAL
BUN SERPL-MCNC: 39 MG/DL (ref 7–30)
CALCIUM SERPL-MCNC: 7.8 MG/DL (ref 8.5–10.1)
CHLORIDE BLD-SCNC: 99 MMOL/L (ref 94–109)
CO2 SERPL-SCNC: 20 MMOL/L (ref 20–32)
COLOR FLD: YELLOW
CREAT SERPL-MCNC: 3.23 MG/DL (ref 0.66–1.25)
EOSINOPHIL NFR FLD MANUAL: 1 %
ERYTHROCYTE [DISTWIDTH] IN BLOOD BY AUTOMATED COUNT: 21.2 % (ref 10–15)
GFR SERPL CREATININE-BSD FRML MDRD: 24 ML/MIN/1.73M2
GLUCOSE BLD-MCNC: 151 MG/DL (ref 70–99)
GRAM STAIN RESULT: NORMAL
GRAM STAIN RESULT: NORMAL
HCT VFR BLD AUTO: 23 % (ref 40–53)
HGB BLD-MCNC: 7.5 G/DL (ref 13.3–17.7)
INR PPP: 1.77 (ref 0.85–1.15)
LYMPHOCYTES NFR FLD MANUAL: 43 %
MCH RBC QN AUTO: 31.5 PG (ref 26.5–33)
MCHC RBC AUTO-ENTMCNC: 32.6 G/DL (ref 31.5–36.5)
MCV RBC AUTO: 97 FL (ref 78–100)
MONOS+MACROS NFR FLD MANUAL: 38 %
NEUTS BAND NFR FLD MANUAL: 19 %
PHOSPHATE SERPL-MCNC: 2 MG/DL (ref 2.5–4.5)
PLATELET # BLD AUTO: 231 10E3/UL (ref 150–450)
POTASSIUM BLD-SCNC: 4 MMOL/L (ref 3.4–5.3)
PROT FLD-MCNC: 4 G/DL
RBC # BLD AUTO: 2.38 10E6/UL (ref 4.4–5.9)
SODIUM SERPL-SCNC: 130 MMOL/L (ref 133–144)
SODIUM SERPL-SCNC: 130 MMOL/L (ref 133–144)
WBC # BLD AUTO: 26.7 10E3/UL (ref 4–11)
WBC # FLD AUTO: 401 /UL

## 2021-07-16 PROCEDURE — 120N000005 HC R&B MS OVERFLOW UMMC

## 2021-07-16 PROCEDURE — 0W9G3ZZ DRAINAGE OF PERITONEAL CAVITY, PERCUTANEOUS APPROACH: ICD-10-PCS | Performed by: PEDIATRICS

## 2021-07-16 PROCEDURE — 36592 COLLECT BLOOD FROM PICC: CPT | Performed by: INTERNAL MEDICINE

## 2021-07-16 PROCEDURE — 87205 SMEAR GRAM STAIN: CPT | Performed by: STUDENT IN AN ORGANIZED HEALTH CARE EDUCATION/TRAINING PROGRAM

## 2021-07-16 PROCEDURE — 250N000011 HC RX IP 250 OP 636: Performed by: INTERNAL MEDICINE

## 2021-07-16 PROCEDURE — 89051 BODY FLUID CELL COUNT: CPT | Performed by: STUDENT IN AN ORGANIZED HEALTH CARE EDUCATION/TRAINING PROGRAM

## 2021-07-16 PROCEDURE — 36415 COLL VENOUS BLD VENIPUNCTURE: CPT | Performed by: INTERNAL MEDICINE

## 2021-07-16 PROCEDURE — 250N000013 HC RX MED GY IP 250 OP 250 PS 637: Performed by: INTERNAL MEDICINE

## 2021-07-16 PROCEDURE — 84295 ASSAY OF SERUM SODIUM: CPT | Performed by: INTERNAL MEDICINE

## 2021-07-16 PROCEDURE — 84100 ASSAY OF PHOSPHORUS: CPT | Performed by: INTERNAL MEDICINE

## 2021-07-16 PROCEDURE — 87070 CULTURE OTHR SPECIMN AEROBIC: CPT | Performed by: STUDENT IN AN ORGANIZED HEALTH CARE EDUCATION/TRAINING PROGRAM

## 2021-07-16 PROCEDURE — 99233 SBSQ HOSP IP/OBS HIGH 50: CPT | Performed by: INTERNAL MEDICINE

## 2021-07-16 PROCEDURE — 32555 ASPIRATE PLEURA W/ IMAGING: CPT | Mod: GC | Performed by: PEDIATRICS

## 2021-07-16 PROCEDURE — 250N000013 HC RX MED GY IP 250 OP 250 PS 637: Performed by: ORTHOPAEDIC SURGERY

## 2021-07-16 PROCEDURE — 85610 PROTHROMBIN TIME: CPT | Performed by: INTERNAL MEDICINE

## 2021-07-16 PROCEDURE — 99233 SBSQ HOSP IP/OBS HIGH 50: CPT | Mod: GC

## 2021-07-16 PROCEDURE — 84295 ASSAY OF SERUM SODIUM: CPT | Performed by: CLINICAL NURSE SPECIALIST

## 2021-07-16 PROCEDURE — 82042 OTHER SOURCE ALBUMIN QUAN EA: CPT | Performed by: STUDENT IN AN ORGANIZED HEALTH CARE EDUCATION/TRAINING PROGRAM

## 2021-07-16 PROCEDURE — 87899 AGENT NOS ASSAY W/OPTIC: CPT | Performed by: STUDENT IN AN ORGANIZED HEALTH CARE EDUCATION/TRAINING PROGRAM

## 2021-07-16 PROCEDURE — 250N000009 HC RX 250: Performed by: INTERNAL MEDICINE

## 2021-07-16 PROCEDURE — 36592 COLLECT BLOOD FROM PICC: CPT | Performed by: STUDENT IN AN ORGANIZED HEALTH CARE EDUCATION/TRAINING PROGRAM

## 2021-07-16 PROCEDURE — 250N000013 HC RX MED GY IP 250 OP 250 PS 637: Performed by: STUDENT IN AN ORGANIZED HEALTH CARE EDUCATION/TRAINING PROGRAM

## 2021-07-16 PROCEDURE — 87040 BLOOD CULTURE FOR BACTERIA: CPT | Performed by: INTERNAL MEDICINE

## 2021-07-16 PROCEDURE — 84157 ASSAY OF PROTEIN OTHER: CPT | Performed by: STUDENT IN AN ORGANIZED HEALTH CARE EDUCATION/TRAINING PROGRAM

## 2021-07-16 PROCEDURE — 71045 X-RAY EXAM CHEST 1 VIEW: CPT

## 2021-07-16 PROCEDURE — 71045 X-RAY EXAM CHEST 1 VIEW: CPT | Mod: 26 | Performed by: RADIOLOGY

## 2021-07-16 PROCEDURE — 87106 FUNGI IDENTIFICATION YEAST: CPT | Performed by: INTERNAL MEDICINE

## 2021-07-16 PROCEDURE — 258N000003 HC RX IP 258 OP 636: Performed by: INTERNAL MEDICINE

## 2021-07-16 PROCEDURE — 85027 COMPLETE CBC AUTOMATED: CPT | Performed by: INTERNAL MEDICINE

## 2021-07-16 RX ADMIN — FOLIC ACID 1 MG: 1 TABLET ORAL at 10:29

## 2021-07-16 RX ADMIN — MEROPENEM 500 MG: 500 INJECTION, POWDER, FOR SOLUTION INTRAVENOUS at 20:42

## 2021-07-16 RX ADMIN — HYDROMORPHONE HYDROCHLORIDE 2 MG: 1 SOLUTION ORAL at 05:35

## 2021-07-16 RX ADMIN — Medication 550 MG: at 20:52

## 2021-07-16 RX ADMIN — SODIUM CHLORIDE, PRESERVATIVE FREE 20 ML: 5 INJECTION INTRAVENOUS at 06:10

## 2021-07-16 RX ADMIN — Medication 5 ML: at 10:30

## 2021-07-16 RX ADMIN — PROCHLORPERAZINE EDISYLATE 5 MG: 5 INJECTION INTRAMUSCULAR; INTRAVENOUS at 23:06

## 2021-07-16 RX ADMIN — HYDROMORPHONE HYDROCHLORIDE 2 MG: 1 SOLUTION ORAL at 16:41

## 2021-07-16 RX ADMIN — POTASSIUM CHLORIDE 20 MEQ: 1.5 POWDER, FOR SOLUTION ORAL at 10:29

## 2021-07-16 RX ADMIN — POTASSIUM & SODIUM PHOSPHATES POWDER PACK 280-160-250 MG 1 PACKET: 280-160-250 PACK at 20:42

## 2021-07-16 RX ADMIN — MELATONIN TAB 3 MG 6 MG: 3 TAB at 23:07

## 2021-07-16 RX ADMIN — Medication 550 MG: at 10:29

## 2021-07-16 RX ADMIN — POTASSIUM & SODIUM PHOSPHATES POWDER PACK 280-160-250 MG 1 PACKET: 280-160-250 PACK at 10:30

## 2021-07-16 RX ADMIN — HYDROMORPHONE HYDROCHLORIDE 2 MG: 1 SOLUTION ORAL at 20:42

## 2021-07-16 RX ADMIN — ONDANSETRON 4 MG: 2 INJECTION INTRAMUSCULAR; INTRAVENOUS at 20:58

## 2021-07-16 RX ADMIN — POTASSIUM CHLORIDE 20 MEQ: 1.5 POWDER, FOR SOLUTION ORAL at 20:42

## 2021-07-16 RX ADMIN — HYDROMORPHONE HYDROCHLORIDE 2 MG: 1 SOLUTION ORAL at 10:45

## 2021-07-16 RX ADMIN — Medication 40 MG: at 10:29

## 2021-07-16 RX ADMIN — PROCHLORPERAZINE EDISYLATE 5 MG: 5 INJECTION INTRAMUSCULAR; INTRAVENOUS at 08:57

## 2021-07-16 RX ADMIN — ONDANSETRON 4 MG: 2 INJECTION INTRAMUSCULAR; INTRAVENOUS at 05:43

## 2021-07-16 RX ADMIN — THIAMINE HCL TAB 100 MG 100 MG: 100 TAB at 10:28

## 2021-07-16 RX ADMIN — HYDROMORPHONE HYDROCHLORIDE 2 MG: 1 SOLUTION ORAL at 00:21

## 2021-07-16 ASSESSMENT — ACTIVITIES OF DAILY LIVING (ADL)
ADLS_ACUITY_SCORE: 17
ADLS_ACUITY_SCORE: 17
ADLS_ACUITY_SCORE: 18

## 2021-07-16 ASSESSMENT — MIFFLIN-ST. JEOR: SCORE: 1637.75

## 2021-07-16 NOTE — PROGRESS NOTES
CLINICAL NUTRITION SERVICES - REASSESSMENT NOTE     Nutrition Prescription    RECOMMENDATIONS FOR MDs/PROVIDERS TO ORDER:  Encourage oral intake     Malnutrition Status:    Severe malnutrition in the context of acute on chronic illness    Recommendations already ordered by Registered Dietitian (RD):  Vital 1.5 Leno @ goal of  65ml/hr (1560ml/day)  will provide: 2340 kcals (33 kcal/kg), 105 g PRO (~1.5), 1191 ml free H20, 291 g CHO, and 9 g fiber daily, 89 g fat, 88 mEq K+, 1951 mg Po4   --Advance TF from 40 ml/hr as tolerated. If unable to advance, TF at current rate provides     960ml/day, 1440 kcals (21 kca/kg), 64 g PRO (0.9, 733 ml free H20, 179 g CHO, and 5 g fiber daily.    Future/Additional Recommendations:  Renal Formula pending function: Nova[employer] Renal @ 50 mL/hr (1200 mL/day) to provide 2400 kcals (34 kcal/kg/day), 109 g PRO (~1.5 g/kg/day), 864 mL H2O, 220 CHO and 0 gm Fiber daily, 120 g fat, 29 mEq K+, 982 mg Po4.     EVALUATION OF THE PROGRESS TOWARD GOALS   Diet: 2 g Na + Ensure clear PRN   Intakes: 50%     Nutrition Support (7/13): vital 1.5 @ 65 mL/hr will provide: 2340 kcals (33 kcal/kg), 105 g PRO (~1.5), 1191 ml free H20, 291 g CHO, and 9 g fiber daily, 89 g fat, 88 mEq K+, 1951 mg Po4   Intake: Patient reached goal volume today @ 0600, had emesis at 0830. TF stopped. Plan to restart @ 40 mL/hr per team and can resume up to 65 mL/hr if tolerating      NEW FINDINGS   Visited with patient today, laying in bed getting lab draw. He reported now tolerating tube feeding, though emesis was due to waking up and too much movement in the morning. He had slice of pizza, peaches, and cookie at bedside + bottle of mountain dew.     Weight Trends:  07/16/21 0546 74 kg (163 lb 2.3 oz)   07/15/21 0016 73.5 kg (162 lb 0.6 oz)   07/14/21 0010 68.2 kg (150 lb 5.7 oz)   07/13/21 0011 69 kg (152 lb 1.9 oz)   07/12/21 1330 70 kg (154 lb 5.2 oz)   07/12/21 0919 71 kg (156 lb 8.4 oz)   07/12/21 0341 71 kg (156 lb 8.4  oz)   07/10/21 0256 69 kg (152 lb 1.9 oz)   07/09/21 0400 68.4 kg (150 lb 12.7 oz)   Admission weight 80 kg (6/28/21)   Will maintain dosing weight of 69.5 kg (lowest weight)     GI: Pancreatic fluid +VRE   Intermittent nausea. Nurse reported emesis appeared to have food particles. Last bowel movement, today, 2 stools documented.   Skin: Jaundiced, generalized bruising. No new deficits.   Renal: Dialysis pushed to 7/17 due to fever and labs ok   Labs: febrile 100.8 overnight   Na 130 (L), K+ 4 (H), Creatinine 3.23 (H), Po4 2 (L), Glucose 151 (H)  Medications: Nephronex, folic acid, lactulose (held), Protonix, Netura-phos,       MALNUTRITION  % Intake: </= 50% for >/= 5 days (severe)  % Weight Loss: > 5% in 1 month (severe), some loss likely fluid related   Subcutaneous Fat Loss: Facial region:  moderate  Muscle Loss: Temporal: Severe, Scapular bone: moderate, Thoracic region (clavicle, acromium bone, deltoid, trapezius, pectoral): moderate, Upper arm (bicep, tricep): moderate, Dorsal hand: moderate, Upper leg (quadricep, hamstring): moderate and Posterior calf: moderate,  Fluid Accumulation/Edema: Does not meet criteria  Malnutrition Diagnosis: Severe malnutrition in the context of acute on chronic illness     Previous Goals   Patient to consume % of nutritionally adequate meal trays TID, or the equivalent with supplements/snacks.  Evaluation: Not met    Total avg nutritional intake to meet a minimum of 25 kcal/kg and 1.3 g PRO/kg daily (per dosing wt 69.5 kg).   Evaluation: Not met    Previous Nutrition Diagnosis  Inadequate protein-energy intake related to inability to consume adequate PO intake as evidenced by increased metabolic demand and calorie count with average intake  171 kcal and 7.6 g protein over 5 days   Evaluation: Modified     CURRENT NUTRITION DIAGNOSIS  Inadequate oral intake related to decreased appetite, inability to take adequate PO and increased metabolic demand as evidenced by intakes  50% or less with multiple days of no intakes documented, AMS 2/2 liver disease with placement of  PEG-J for nutrition support and increased energy/protein demand 2/2 liver disease and dialysis    INTERVENTIONS  Implementation  Collaboration with other providers    Goals  Total avg nutritional intake to meet a minimum of 25 kcal/kg and 1.3 g PRO/kg daily (per dosing wt 69.5 kg).    Monitoring/Evaluation  Progress toward goals will be monitored and evaluated per protocol.    Jennifer Castillo RD, LD  6B pager: [phone]

## 2021-07-16 NOTE — PROGRESS NOTES
Nephrology Progress Note  07/16/2021         Dax Villareal is a 30 year old male with h/o ETOH hepatitis, end stage liver disease, RADHA on HD, transferred from [facility] in [place] for septic shock on 6/28/21 for treatment of necrotizing pancreatitis and decompensated liver disease. He was initially admitted to [facility] 5/19/21.        Interval History :   Mr Villareal had day off HD yesterday, had planned to run today but has tachycardia in 130's and is running fevers with concerning picture for infection.  No issues pushing for run in chemistries, will plan to run tomorrow am.        Assessment & Recommendations:   RADHA-Cr was 0.8 as recently as 5/19/2021 but now HD dependent, started RRT at [facility] prior to transfer to [facility] (exact date unclear but per family it was end of May).  Etiology likely multifactorial with contrast, sepsis/pancreatitis, likely HRS physiology contributing as well.  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  Continuing RRT as long as it is tolerated with regards to BP's, is functional enough to possibly do outpt HD.  MELD is in high 30's so has high 3 month mortality but continuing restorative cares for now.                -Line is TDC from PTA               -Holding on run today as he is tachycardic and running fevers, plan for HD tomorrow.        Volume status-Anuric, no major edema on exam, wt is down nearly 10kg since admission.  Holding on run today with new fevers and tachycardia.      Electrolytes/pH-K 4.0, bicarb 20, no acute issues.      Ca/phos/pth-Ca 7.8, Mg and Phos last checked 7/7, ordered for tomorrow.       Anemia-Hgb 7.5, acute management per team, last PRBC's 7/10.       Nutrition-Taking PO, appetite ok.       Discussed with Dr Reid     Recommendations were communicated to primary team via verbal communication.           SOFIA Marlow CNS  Clinical Nurse Specialist  [phone]    Review of Systems:   I reviewed the following systems:  Gen: No fevers or  "chills  CV: No CP at rest  Resp: No SOB at rest  GI: No N/V    Physical Exam:   I/O last 3 completed shifts:  In: 1692 [P.O.:240; Other:30; NG/GT:417]  Out: 255 [Emesis/NG output:50; Drains:205]   /47 (BP Location: Right arm)   Pulse 118   Temp 98.9  F (37.2  C) (Oral)   Resp 16   Ht 1.676 m (5' 6\")   Wt 74 kg (163 lb 2.3 oz)   SpO2 96%   BMI 26.33 kg/m       GENERAL APPEARANCE: Seen on HD, in no distress.    EYES:  scleral icterus, pupils equal  Pulmonary: lungs clear to auscultation. Off supplemental oxygen   CV: tachy   - Edema - no LE edema  GI: large distended, tender. Has retroperitoneal drain  MS: no evidence of inflammation in joints, no muscle tenderness  SKIN: no rash, warm, dry, no cyanosis  NEURO: alert/interactive  ACCESS: Right TDC     Labs:   All labs reviewed by me  Electrolytes/Renal - Recent Labs   Lab Test 07/16/21  0616 07/15/21  0811 07/15/21  0443 07/14/21  0345 07/13/21  0718   *  130*  --  131* 130* 131*   POTASSIUM 4.0  --  3.3* 4.2 3.6   CHLORIDE 99  --  98 98 98   CO2 20  --  23 20 23   BUN 39*  --  26 42* 29   CR 3.23*  --  2.30* 3.35* 2.60*   * 126* 131* 137* 94   JANENE 7.8*  --  8.1* 8.6 8.5   MAG  --   --  1.6 1.6 1.5*   PHOS 2.0*  --  3.0 4.6* 4.3       CBC -   Recent Labs   Lab Test 07/16/21  0616 07/15/21  0443 07/14/21  2240 07/14/21  0342   WBC 26.7* 20.3*  --  17.7*   HGB 7.5* 8.0* 7.9* 6.9*  6.9*    219  --  213       LFTs -   Recent Labs   Lab Test 07/15/21  0443 07/14/21  0345 07/13/21  0718   ALKPHOS 182* 174* 167*   BILITOTAL 9.3* 11.1* 13.9*   ALT 16 12 15   AST 86* 54* 60*   PROTTOTAL 7.6 7.4 6.9   ALBUMIN 2.7* 2.6* 2.6*       Iron Panel - No lab results found.        Current Medications:    B and C vitamin Complex with folic acid  5 mL Per Feeding Tube Daily     folic acid  1 mg Oral Daily    Or     folic acid  1 mg Intravenous Daily     lactulose  20 g Oral or NG Tube 4x Daily     melatonin  6 mg Oral At Bedtime     meropenem  500 mg " Intravenous Q24H     pantoprazole  40 mg Oral QAM AC     potassium & sodium phosphates  1 packet Oral or Feeding Tube BID     potassium chloride  20 mEq Oral BID     rifaximin  550 mg Oral BID     sodium chloride (PF)  10 mL Irrigation Q8H     sodium chloride (PF)  3 mL Intracatheter Q8H     vitamin B1  100 mg Oral Daily    Or     thiamine  100 mg Intravenous Daily       - MEDICATION INSTRUCTIONS -       dextrose

## 2021-07-16 NOTE — CODE/RAPID RESPONSE
Rapid Response Team Note    Assessment   In assessment a rapid response was called on Dax Villareal due to lactic acidosis. This presentation is likely due to cirrhosis and sepsis.    Plan   -  No current intervention  -  The Daniela primary team was contacted and no new intervention was suggested.  -  Disposition: The patient will remain on the current unit. We will continue to monitor this patient closely.  -  Reassessment and plan follow-up will be performed by the primary team    SOFIA Peraza [facility]    Hospital Course   Brief Summary of events leading to rapid response:   Lactic acidosis (sepsis protocol triggered due to HR and WBC). Already on appropriate ABX.    Admission Diagnosis:   Pancreatitis [K85.90]     Physical Exam   Temp: 99.7  F (37.6  C) Temp  Min: 98  F (36.7  C)  Max: 100.8  F (38.2  C)  Resp: 16 Resp  Min: 16  Max: 20  SpO2: 96 % SpO2  Min: 95 %  Max: 98 %  Pulse: (!) 124 Pulse  Min: 109  Max: 134    No data recorded  BP: 105/46 Systolic (24hrs), Av , Min:104 , Max:124   Diastolic (24hrs), Av, Min:44, Max:62     I/Os: I/O last 3 completed shifts:  In: 2140 [P.O.:730; Other:60; NG/GT:390]  Out: 665 [Emesis/NG output:450; Drains:215]     Exam:   General: acutely ill appearing  Mental Status: baseline mental status.    Significant Results and Procedures   Lactic Acid:   Recent Labs   Lab Test 07/15/21  2214 07/14/21  1945 21  21021  210   LACT 2.1* 2.7* 0.9   < >  --   --   --    LACTS  --   --   --   --  1.1 0.9 1.5    < > = values in this interval not displayed.     CBC:   Recent Labs   Lab Test 07/15/21  0443 21  2240 21  0342 21   WBC 20.3*  --  17.7* 21.3*   HGB 8.0* 7.9* 6.9*  6.9* 6.7*   HCT 24.2*  --  20.7* 20.4*     --  213 211        Sepsis Evaluation   The patient is known to have an infection.  NO EVIDENCE OF SEPSIS at this time.  Vital sign, physical exam, and lab findings are  due to liver disease.

## 2021-07-16 NOTE — PLAN OF CARE
Neuro: A&Ox4.   Cardiac: ST, HR 110s-130s. VSS.   Respiratory: Sating 92%+ on RA, denies SOB  GI/: Anuric, Frequent loose stools, continent. Mild nausea, zofran given x1. G to gravity with no output, J with TF now advanced to goal. L abd drain with ~75mls overnight, irrigated  per order.   Diet/appetite: Tolerating 2gm Na+ diet with TF.   Activity:  Assist of 1 to reposition in bed.   Pain: At acceptable level on current regimen. PRN dilaudid given x2 for abd/back pain.   Skin: No new deficits noted. Redness to coccyx with generalized bruising/jaundice.   LDA's: L triple lumen PICC with all ports blood return/saline locked.     Plan: Continue with POC. Notify primary team with changes.

## 2021-07-16 NOTE — PROGRESS NOTES
Attempted to trouble shooting catheter( PICC) using 3 ways stopcock and unsuccessful the other 2 lumens are working. Patient's nurse informed.

## 2021-07-16 NOTE — CONSULTS
Consult and Procedure Service - Procedure Note    Attending: Nelson Stearns  Resident: Woody Gregory  Indication: Sepsis work up with ascites  Risk Assessment: Low  Pre-procedure diagnosis: Ascites  Post-procedure diagnosis: Ascites  Procedure name: Diagnostic Paracentesis    The risks and benefits of the procedure were explained to patient who expressed understanding and opted to proceed.  Consent was obtained and placed in the chart during this hospitalization.  A time out was performed.  An area of ascites was located with ultrasound and marked in the right lower quadrant; the area was prepped and draped in the usual sterile fashion.  10 ml of 1% lidocaine was instilled with a 25 gauge needle and ascites located under real-time guidance. 10 ml of yellow colored fluid was removed and sent for analysis and the area dressed.     Patient tolerated the procedure well. Please contact the Consult and Procedure Service if any complications or concerns arise.     JENISE Gregory MD  Internal Medicine-Pediatrics, MP-4    DOS:  7/16/2021

## 2021-07-16 NOTE — PROGRESS NOTES
[facility] PROGRESS NOTE     Patient:  Dax Villareal   Date of birth 1990, Medical record number [id number]  Date of Visit:  07/16/2021  Date of Admission: 6/28/2021  Consult Requester:Patricia Baker, *          Assessment and Plan:   RECOMMENDATION:  1. Continue antibiotic coverage with meropenem, currently planned through 7/18.   2. If patient clinically decompensates, would escalate antibiotic coverage with addition of daptomycin to cover gram positives (dennis enterococcus given E faecium is only 20% sensitive to Vanco per [facility] antibiogram) and IV micafungin 100 mg daily to provide antifungal coverage.   3. Agree with repeat infectious work up including BCx, diagnostic paracentesis. Would sent C diff PCR to rule out given rising WBC count, prolonged broad spectrum abx, and abdominal pain.   4. Please send cryptococcal Ag from serum.     ASSESSMENT:  Dax Villareal is a 30 year old male with PMHx significant for alcohol use disorder who was admitted to [facility] in [place], [place] for abdominal pain, nausea and vomiting, found to have end stage liver disease c/b hepatic encephalopathy, acute renal failure requiring iHD, SBP and acute necrotizing pancreatitis.      Afebrile on admission, however has spiked some low grade intermittent fevers up to 100.9 on 7/1-2 then defervesced. Has been afebrile since. WBC significantly elevated on admission to 50K with slow downtrend over the past few weeks, currently 17K. Lactate intermittently elevated into the 2-3 range, otherwise wnl. Infectious work up since admission to [facility] has remained negative. BCx negative. Drain placement pancreatic cultures collected 6/30 NGTD/negative. 7/2 Ascites fluid cultures negative, labs, labs included WBC 2663/74% neutrophils. Repeat ascites fluid cultures 7/5 with 1801 WBC/61% neutrophils, cultures negative. 7/13 ascites fluid cultures NGTD. See imaging section below for review.      Started on meropenem  (6/29- current), micafungin (6/29-7/1), and given 1x dose of IV Vancomycin (6/29). On Rifaximin.  Has completed 16 days of broad spectrum abx while admitted to [facility] as of 7/14. Reasonable to continue coverage on antibiotics (meropenem or cipro/flagyl per GI) for the next 4-5 days post replacement of perc drain then would discontinue 7/18. This will give him a total of 21 days of broad spectrum antibiotics post initial perc drain placement for necrotizing pancreatitis with pseudocyst. Cultures remain largely negative. Abscess culture with growth of VRE.      Thank you for this consult. ID will continue to follow.      Patient was discussed with Dr. Pacheco.      Tavia Romano PA-C  Infectious Diseases  Pager #[phone]         Interim History and Events:   Dax is not feeling well today. He notes increased abd pain, feels like diarrhea is worsened, and had nausea/vomiting this AM. He was febrile overnight to 100.8 and persistently tachycardic. Notes more fatigue.          HPI:   Dax Villareal is a 30 year old male with PMHx significant for alcohol use disorder who was admitted to [facility] in [place], [place] for abdominal pain, nausea and vomiting, found to have end stage liver disease c/b hepatic encephalopathy, acute renal failure requiring iHD, SBP and acute necrotizing pancreatitis.      He required respiratory support (BIPAP, ? Intubation) for respiratory failure and appeared to be in sepsis (lactate reported at 7.7 with abnormal LFTs). He was transferred to [facility] for prolonged admission and reportedly treated with steroids; kidney function and pancreatic enzymes normal on admission). Length of stay stated >40 days. CT imaging 6/19 with concern for necrotizing pancreatitis with follow up CT scan 6/25 with peripancreatic fluid collection and 9 cm soft tissue/hemorrhagic structure c/f internal hemorrhage of a pseudocyst. Given multiple antibiotics (Cefepime, Flagyl, Ciprofloxacin, micafungin).  "Hospital course reportedly complicated by hypoxemic respiratory failure requiring BIPAP, ARF requiring iHD, hepatic encephalopathy, and septic shock 2/2 necrotizing pancreatitis/pancreatic pseudocyst requiring pressor support. Ultimately transferred to [facility] for interventions 6/28. Admitted to the ICU.      Now status post perc IR drain placement 6/29 with cultures that remain NGTD/negative. No endoscopic drainage by GI was pursued given improvement in imaging 7/11. Additionally has had 3 paracenteses (7/2, 7/5, and 7/13 with negative culture work up. Had PEGJ placed 7/13.      He is an outdoorsman. Picks mushrooms, goes fishing/hunting, and hiking. Has not been since \"last spring\". Denies feeling ill prior to admission to OSH. Feeling fairly well currently although notes diffuse abdominal pain. Has had multiple procedures within the past few days and states he feels like he needs a \"rest day\". Denies fever/chills. No URI sx. Is having diarrhea, but on lactulose. Anuric.          ROS:   -Focused 5 point ROS completed, pertinent positives and negatives listed above.    Physical Examination:  Temp: 98.9  F (37.2  C) Temp src: Oral BP: 105/47 Pulse: 118   Resp: 16 SpO2: 96 % O2 Device: None (Room air)      Vitals:    07/12/21 1330 07/13/21 0011 07/14/21 0010 07/15/21 0016   Weight: 70 kg (154 lb 5.2 oz) 69 kg (152 lb 1.9 oz) 68.2 kg (150 lb 5.7 oz) 73.5 kg (162 lb 0.6 oz)    07/16/21 0546   Weight: 74 kg (163 lb 2.3 oz)       Constitutional: Pleasant male patient seen lying in bed, in NAD. Alert and interactive. Appears fatigued.   HEENT: NCAT, icterus, conjunctiva clear. Moist mucous membranes.  Respiratory: Non-labored breathing, good air exchange on RA. Lungs are clear to auscultation bilaterally, crackles.  Cardiovascular: Tachycardic, regular rhythm with no murmur.  GI: Normoactive BS. Declined abdominal palpation exam. GJ tube in place without surrounding erythema.  Skin: Warm and dry. Jaundiced. No rashes, " scattered petechiae on torso.  Musculoskeletal: Extremities grossly normal. No tenderness or edema present.   Neurologic: A &O x3, speech normal, answering questions appropriately. Moves all extremities spontaneously. Grossly non-focal.  Neuropsychiatric: Mentation and affect normal/appropriate.  VAD: CVC is c/d/i with no erythema, drainage, or tenderness.      Medications:    B and C vitamin Complex with folic acid  5 mL Per Feeding Tube Daily     folic acid  1 mg Oral Daily    Or     folic acid  1 mg Intravenous Daily     lactulose  20 g Oral or NG Tube 4x Daily     melatonin  6 mg Oral At Bedtime     meropenem  500 mg Intravenous Q24H     pantoprazole  40 mg Oral QAM AC     potassium & sodium phosphates  1 packet Oral or Feeding Tube BID     potassium chloride  20 mEq Oral BID     rifaximin  550 mg Oral BID     sodium chloride (PF)  10 mL Irrigation Q8H     sodium chloride (PF)  3 mL Intracatheter Q8H     vitamin B1  100 mg Oral Daily    Or     thiamine  100 mg Intravenous Daily       Infusions/Drips:    - MEDICATION INSTRUCTIONS -       dextrose         Laboratory Data:   No results found for: ACD4    Inflammatory Markers    Recent Labs   Lab Test 10/06/20  0927   CRP 16.9*       Metabolic Studies       Recent Labs   Lab Test 07/16/21  0616 07/15/21  2214 07/15/21  0811 07/15/21  0443 07/14/21  1945 07/14/21  0345 07/13/21  1050 07/13/21  0718 07/13/21  0718 07/12/21  2123 07/12/21  2105 07/11/21  1740 07/11/21  0607   *  130*  --   --  131*  --  130*  --   --  131*  --  130*  --  126*   POTASSIUM 4.0  --   --  3.3*  --  4.2  --   --  3.6  --  3.7  --  4.6   CHLORIDE 99  --   --  98  --  98  --   --  98  --  97  --  96   CO2 20  --   --  23  --  20  --   --  23  --  21  --  20   ANIONGAP 11  --   --  10  --  12  --   --  10  --  12  --  10   BUN 39*  --   --  26  --  42*  --   --  29  --  21  --  41*   CR 3.23*  --   --  2.30*  --  3.35*  --   --  2.60*  --  2.06*  --  3.20*   GFRESTIMATED 24*  --   --   36*  --  23*  --   --  31*  --  42*  --  24*   *  --  126* 131*  --  137* 101*  --  94  --  91   < > 95   JANENE 7.8*  --   --  8.1*  --  8.6  --   --  8.5  --  8.6  --  8.3*   PHOS 2.0*  --   --  3.0  --  4.6*  --    < > 4.3  --  3.4   < >  --    MAG  --   --   --  1.6  --  1.6  --   --  1.5*   < >  --   --   --    LACT  --  2.1*  --   --  2.7*  --   --   --  0.9  --   --   --   --     < > = values in this interval not displayed.       Hepatic Studies    Recent Labs   Lab Test 07/15/21  0443 07/14/21  0345 07/13/21  0718 07/12/21  2105 07/11/21  0607 07/10/21  0625   BILITOTAL 9.3* 11.1* 13.9* 14.6* 17.3* 17.7*   ALKPHOS 182* 174* 167* 169* 177* 187*   ALBUMIN 2.7* 2.6* 2.6* 2.7* 2.8* 3.1*   AST 86* 54* 60* 60* 79* 108*   ALT 16 12 15 17 21 24       Pancreatitis testing    Recent Labs   Lab Test 06/29/21 0410 06/28/21 1839 05/18/21  1220 12/09/20  1504 10/06/20  0927   LIPASE  --  199 381 140 139   TRIG Unsatisfactory specimen - icteric Canceled, Test credited  --   --   --        Hematology Studies      Recent Labs   Lab Test 07/16/21  0616 07/15/21  0443 07/14/21  2240 07/14/21 0342 07/13/21 0718 07/12/21 2105 07/11/21 0607 06/29/21 0410 06/28/21  1839 05/18/21  1220 12/09/20  1504 10/06/20  0927   WBC 26.7* 20.3*  --  17.7* 21.3* 24.6* 25.8*  --  50.6* 13.2* 4.2 6.9   ANEU  --   --   --   --   --   --   --   --  44.0* 12.0* 2.1 5.2   ALYM  --   --   --   --   --   --   --   --  0.0* 0.6* 1.3 0.8   MEDHAT  --   --   --   --   --   --   --   --  2.0* 0.4 0.6 0.8   AEOS  --   --   --   --   --   --   --   --  0.5 0.0 0.2 0.1   HGB 7.5* 8.0* 7.9* 6.9*  6.9* 6.7* 7.4* 7.5*  --  7.6* 16.6 17.2 18.8*   HCT 23.0* 24.2*  --  20.7* 20.4* 21.3* 22.7*  --  23.4* 44.8 47.7 51.2    219  --  213 211 214 204   < > 127* 222 299 311    < > = values in this interval not displayed.       Arterial Blood Gas Testing    Recent Labs   Lab Test 07/01/21  0014 06/30/21  1716 06/28/21  1839   O2PER 60.0 5L 2L        Urine  Studies     Recent Labs   Lab Test 20  1817 10/06/20  1214   URINEPH 7.0 7.0   NITRITE Negative Negative   LEUKEST Negative Negative   WBCU 0 1       Vancomycin Levels     Recent Labs   Lab Test 21  0700   VANCOMYCIN 31.2*       Tobramycin levels     No lab results found.    Gentamicin levels    No lab results found.    CSF testing   No lab results found.      Microbiology:  Culture Micro   Date Value Ref Range Status   2021 No growth  Final   2021 Culture negative after 1 week  Preliminary   2021 No growth  Final   2021 No growth  Final   2021 No growth  Final   2021 No growth  Final   2021 No anaerobes isolated  Final   2021 No growth  Final   2021 No growth  Final   2021 No growth after 6 days  Final   2021 No growth after 6 days  Final       Last check of C difficile  No results found for: CDBPCT    Imagin/11 CT AP w/o contrast   IMPRESSION:   1. Sequela of necrotizing pancreatitis with highly decreased size of  walled off necrosis centered in the lesser sac. Percutaneous drain  remains in place.  2. Loculated moderate ascites is unchanged.  3. Hepatomegaly, hepatic steatosis, and evidence of portal  hypertension again seen.  4. Small bilateral pleural effusions are slightly improved.      CT AP w/o contrast   IMPRESSION:   1. Status post placement of a left lateral approach drainage catheter,  substantially decreased but persistent hemorrhagic walled off necrosis  centered in the lesser sac. A smaller collection along the inferior  aspect of the third portion of the duodenum is minimally changed.  2. Minimally changed moderate to large volume loculated ascites.  3. Hepatomegaly and hepatic steatosis with evidence of cirrhosis and  portal hypertension.  4. Diffuse edematous wall thickening of the small and large bowel,  likely reactive. No bowel obstruction.  5. Small right and trace left pleural effusions with  adjacent  compressive atelectasis, similar to prior. Small pericardial effusion.  6. Faint patchy ground glass opacities in the right middle lobe and  and right upper lobe, likely infectious/inflammatory.     6/29 CT AP w/ contrast   IMPRESSION:   1.  Sequela of necrotizing pancreatitis with peripancreatic necrotic  fluid collection, large volume abdominal ascites and mesenteric edema.  2.  Thickened, edematous small bowel wall is likely due to reactive  enteritis secondary to pancreatitis.  3.  Hepatomegaly with steatosis and mild surface nodularity,  associated with splenomegaly and recanalized paraumbilical vein,  suggestive of cirrhosis with portal hypertension.  4.  Enlarged hypoenhancing kidneys with loss of corticomedullary  differentiation, suggestive of acute renal parenchymal disease. No  hydronephrosis or perinephric fat stranding.  5.  Small right and trace left pleural effusions, interlobular septal  thickening, and diffuse anasarca, likely related to fluid third  spacing.

## 2021-07-16 NOTE — PROGRESS NOTES
Resident/Fellow Attestation   I, Clarissa Cleveland, was present with the medical/NAHUM student who participated in the service and in the documentation of the note.  I have verified the history and personally performed the physical exam and medical decision making.  I agree with the assessment and plan of care as documented in the note.      Clarissa Cleveland MD  PGY1  Date of Service (when I saw the patient): 07/16/21    [facility]    Progress Note - Maroon 3 Service        Date of Admission:  6/28/2021    Assessment & Plan   Dax Villareal is a 30 year old male with hx of alcohol use disorder admitted on 6/28/2021 after recent prolonged admission at [facility] for acute alcoholic hepatitis who has ESLD c/b HE, ARF requiring HD, and SBP and malnutrition in the setting of acute necrotizing pancreatitis, s/p PEG-J placement on 7/13. He arrived with acute respiratory failure and septic shock that have resolved, and his SBP has been appropriately treated. He requires continued management for ARF, hepatic encephalopathy, infected pancreatic pseudocyst, and nutritional support via PEG-J.       Changes today:   - Due to fever, elevated WBC today: Infectious workup (blood clx, diagnostic para, CXR)  - Pancreatic fluid collection growing VRE, no change in abx at this time per ID, will continue Tom   - TF held d/t large emesis, restarted at 40 mL/hr, plan to work up to 65 mL/hr again as pt tolerates     #Necrotizing pancreatitis w/ infected pseudocyst s/p RP Drain, improving  #Septic Shock, resolved   #SBP, resolved  #Leukocytosis, acute rise iso down trending WBCs  Etiology of the septic shock that he came in with likely due to necrotizing pancreatitis, infected pseudocyst, and SBP. Had percutaneous RP drain placed for drainage of pancreatic collection. Diagnostic para returned with SBP. Repeat CT 7/11 showed significant decrease in size of walled off necrosis and unchanged  moderate ascites. S/p therapeutic paracentesis 7/13 (1L removed).     7/15-7/16 overnight, pt febrile to 100.8 and has increased leukocytosis and tachycardia, pt also significantly less interactive and very fatigued. Concern for acute infection, ddx includes bacteremia iso recent procedures (drain, PEG-J) v new SBP v UTI v C diff. Pt has many new potential sources of infection with recent procedures (RP drain replaced, PEG-J tube) and lines (CVC, PICC). Pancreatic collection fluid culture grew VRE. Hard to say clinically if C diff is likely d/t lactulose regimen, but will plan to evaluate other sources of infection first.     - Continue meropenem 500 mg q24h (Total 21 day course - Through 7/19), spoke with ID today will not change abx for now   - Repeat blood cultures   - Check CXR  - Diagnostic para today, follow results   - Daily CBC  - MAP goal > 65   - GI following, appreciate recs   - For back pain: Tylenol PRN and Dilaudid 1-2mg q4h PRN  - Will need cipro ppx for SBP after discontinuing Meropenem     # Severe malnutrition in setting of chronic illness  PEG-J placed 7/13. Procedure went well, patient reached goal of 65 mL/hr but had large emesis 7/16 AM, TF turned off. Restarted TF at 40 mL/hr, will increase today as pt tolerates. FWF 30 mL q4h. Will keep G tube open/vented.   - Dietitian consult for new tube feeds, appreciate recs              - TF 65 mL/hr, FWF 30 mL q4h  - Regular Diet, thin liquids per SLP  - Continue multivitamins  - Pantoprazole 40 mg IV daily  - Zofran PRN     # Decompensated EtOH cirrhosis c/b hepatic encephalopathy and recurrent ascites  # Hx of Alcohol use disorder c/b withdrawal   # Anemia  # Coagulopathy   Patient with a history of marked alcohol use and alcoholic hepatitis in the past. Was given a course of steroids (1 week) during previous admission at OSH. Patient mental status has been stable, alert and oriented x4 for a week, though on 7/16 patient is very fatigued. Still seems  clear from an HE standpoint, well managed on lactulose regimen. S/p pRBC infusion x2 over the past few days d/t a drop to 6.7 on 7/13 (etilogy likely chronic liver disease, chronic kidney disease, phlebotomy, dilution, poor nutrition, ect), Hgb now stable at 7.5, anticipate another pRBC infusion this weekend.   - Lactulose 20g QID and PRN and rifaximin 550mg BID (titrate to 1.5L stool/day)  - Trend MELD labs  - Daily folic acid and thiamine  - Delirium precautions  - Melatonin 6mg at bedtime  - Avoid benzos and opioids as able  - Monitor hemoglobin  - Transfuse if hemoglobin < 7, platelet < 10 or < 30 with bleeding  - Continue to monitor for signs of bleeding     MELD-Na score: 35 at 7/16/2021  6:16 AM  MELD score: 33 at 7/16/2021  6:16 AM  Calculated from:  Serum Creatinine: 3.23 mg/dL at 7/16/2021  6:16 AM  Serum Sodium: 130 mmol/L at 7/16/2021  6:16 AM  Total Bilirubin: 9.3 mg/dL at 7/15/2021  4:43 AM  INR(ratio): 1.77 at 7/16/2021  6:16 AM  Age: 30 years     # ARF on HD (MWF)  # Hypervolemia causing acute respiratory failure, resolved  Patient febrile to 100.8 overnight on 7/16, dialysis pushed from today to Saturday 7/17 instead as his labs still looked okay.   Nephrology consulted, recs appreciated     > Hemodialysis MWF              - Midodrine 10 mg PRN before dialysis sessions     > Daily chemistry labs, strict I/O, and daily weights  - Aspiration precautions  - Supplemental oxygen as needed to keep sats above 89%     # Goals of Care  Please see additional interval note for in depth details regarding conversation on 7/9 about goals of care. Patient's long term prognosis is still quite poor given his multi organ failure. Care conference on 7/15, pt did not wish to participate. Met with sisters Noni (on phone) and Melyssa and close friend Leroy with palliative, nephrology and general medicine teams today to discuss Dax's current improving status, poor prognosis and wishes to continue full cares. Plan to meet  "Fridays at 4 pm with teams and family for weekly care conferences.     Diet: Adult Formula Drip Feeding: Continuous Vital 1.5; Jejunostomy; Goal Rate: 65; mL/hr; Medication - Feeding Tube Flush Frequency: At least 15-30 mL water before and after medication administration and with tube clogging; Amount to Send (Nutrition us...  2 Gram Sodium Diet  Snacks/Supplements Adult: Ensure Clear; With Meals    DVT Prophylaxis: Pneumatic Compression Devices  Rdz Catheter: Not present  Fluids: 5 mL NS TKO  Central Lines: PRESENT  PICC Triple Lumen 06/28/21 Left-Site Assessment: WDL  CVC Double Lumen Right-Site Assessment: WDL  Code Status: Full Code      Disposition Plan   Expected discharge: >7 days, recommended to transitional care unit once mental status at baseline, renal function and nutrition improved and necrotizing pancreatitis is well managed.     The patient's care was discussed with the Attending Physician, Dr. Baker.    Jaime Amin, MS3  Medical Student  [address]  Securely message with the Vocera Web Console (learn more here)  Text page via [employer] Paging/Directory  Please see sign in/sign out for up to date coverage information  ____________________________________________________________________    Interval History   Overnight, pt triggered sepsis protocol again. Febrile to 100.8, elevated lactate, no interventions taken. Had back pain and nausea, treated with Dilaudid x2 and Zofran x1.     This morning, pt was not feeling well and very fatigued. Stated that he hurts \"all over,\" had large emesis this morning. Compared to other days this week, patient was not interactive, very sleepy, though still seems fairly clear from a HE standpoint. Still able to communicate needs and how he feels. Will reassess mental status again later in the afternoon.    Data reviewed today: I reviewed all medications, new labs and imaging results over the last 24 hours. "     Physical Exam   Vital Signs: Temp: 98.1  F (36.7  C) Temp src: Oral BP: 113/42 Pulse: (!) 125   Resp: 20 SpO2: 96 % O2 Device: None (Room air)    Weight: 163 lbs 2.25 oz     Gen: Lying down in bed, sleeping. Compared to yesterday, patient is significantly more tired, does not interact or talk much. Still seems to be clear in terms of HE, but very fatigued.  HEENT: Conjunctival icterus  CV: Tachycardic. Regular rhythm.   Resp: Breathing comfortably on RA  Abd: Soft, distended, epigastric area tender to palpation, especially around PEG-J site. Lower abdomen under umbilicus also tender to palpation. PEG-J insertion site looks clean, non-erythematous.   Skin: Jaundiced.  Back: Left flank RP drain with maroon/brown OP. No tenderness around tube insertion site.   Ext: Well perfused, no edema  Neuro: oriented x4. Able to communicate needs fairly well. Difficult to evaluate asterixis today.     Data   Recent Labs   Lab 07/16/21  0616 07/15/21  0811 07/15/21  0443 07/14/21  2240 07/14/21  0345 07/14/21  0343 07/14/21  0342 07/14/21  0342 07/13/21  0718   WBC 26.7*  --  20.3*  --   --   --   --  17.7*  --    HGB 7.5*  --  8.0* 7.9*  --   --   --  6.9*  6.9*   < >   MCV 97  --  96  --   --   --   --  96  --      --  219  --   --   --   --  213  --    INR 1.77*  --  1.60*  --   --  1.75*  --   --   --    *  130*  --  131*  --  130*  --   --   --   --    POTASSIUM 4.0  --  3.3*  --  4.2  --   --   --   --    CHLORIDE 99  --  98  --  98  --   --   --   --    CO2 20  --  23  --  20  --   --   --   --    BUN 39*  --  26  --  42*  --   --   --   --    CR 3.23*  --  2.30*  --  3.35*  --   --   --   --    ANIONGAP 11  --  10  --  12  --   --   --   --    JANENE 7.8*  --  8.1*  --  8.6  --   --   --   --    * 126* 131*  --  137*  --    < >  --   --    ALBUMIN  --   --  2.7*  --  2.6*  --   --   --   --    PROTTOTAL  --   --  7.6  --  7.4  --   --   --   --    BILITOTAL  --   --  9.3*  --  11.1*  --   --   --   --     ALKPHOS  --   --  182*  --  174*  --   --   --   --    ALT  --   --  16  --  12  --   --   --   --    AST  --   --  86*  --  54*  --   --   --   --     < > = values in this interval not displayed.     Recent Results (from the past 24 hour(s))   XR Chest Port 1 View    Narrative    XR CHEST PORT 1 VIEW  7/15/2021 5:17 PM      HISTORY: Occluded PICC line port, ensure appropriate PICC placement    COMPARISON: 6/30/2021    FINDINGS:   Single AP view of the chest. Right tunneled IJ central line tip  overlying the superior cavoatrial junction. Left upper extremity PICC  tip overlying the low SVC. Trachea is midline. Cardiac silhouette is  similar in size. No pneumothorax or significant pleural effusion.  Decreased perihilar and bibasilar interstitial and airspace opacities.  Partially visualized left upper quadrant abdominal drain.      Impression    IMPRESSION:     1. Left upper extremity PICC tip overlying the low SVC.  2. Cardiomegaly with decreased perihilar and bibasilar interstitial  and airspace opacities.     I have personally reviewed the examination and initial interpretation  and I agree with the findings.    NEHEMIAH SHARMA MD         SYSTEM ID:  [id number]

## 2021-07-16 NOTE — PROVIDER NOTIFICATION
-------------------CRITICAL LAB VALUE-------------------    Lab Value: Abd Abscess collect on 7/14 with probable VRE  Time of notification: 1102  MD notified: Clarissa Cleveland MD   Patient status: VSS.  Continues to complain of Abd boykin.  PRN medication given.  Temp:  [98.1  F (36.7  C)-100.8  F (38.2  C)] 98.1  F (36.7  C)  Pulse:  [124-134] 125  Resp:  [16-24] 20  BP: (104-124)/(42-62) 113/42  SpO2:  [96 %-98 %] 96 %

## 2021-07-16 NOTE — PLAN OF CARE
Neuro: A&Ox4. Drowsiness, easy to arouse. Pt withdrawn with flat affect. Multiple requests to limit interactions. Requests to be alone to rest. Refused lactulose administrations at 8 and 12. Agreed to take afternoon dose.  Cardiac: SR-ST. 100's-120's. Declines chest pain. Bps of 100's/40s, MAPs greater than 60.   Respiratory: Sating high 90's on RA. No complaints of SOB.  GI/: Adequate urine output. No bowel movements during shift.  Diet/appetite: Tolerating general diet. Produced emesis this AM, intermittent nausea throughout shift. Tube feed stopped after emesis. Resumed tube feed in afternoon at a rate of 40 mls/hour, increase back to goal rate as tolerated.   Activity:  Assist of 2 to turn in bed. Pt refused assistance with repositioning in bed. Pt able to shift himself in bed.  Pain: Generalized pain in AM, no complaints of pain in the afternoon.   Skin: Dry skin, blanchable lesion on left heal. Chg bath given this AM.  LDA's: Triple lumen picc on upper left arm. G/J tube with tube feed infusing. Pt takes all meds in J tube. Left abdominal drain, output of 65 mls in the last 8 hours. Q8 hour irrigation per MAR.    Plan: Continue with POC. Notify primary team with changes.

## 2021-07-16 NOTE — PROVIDER NOTIFICATION
07/15/21 2300   Call Information   Date of Call 07/15/21   Time of Call 2314   Name of person requesting the team Renea COOPER   Title of person requesting team RN   RRT Arrival time 2317   Time RRT ended 2321   Reason for call   Type of RRT Adult   Primary reason for call Sepsis suspected   Sepsis Suspected Elevated Lactate level;Heart Rate > 100;WBC <4 or >12   Was patient transferred from the ED, ICU, or PACU within last 24 hours prior to RRT call? No   SBAR   Situation LA 2.1   Background a 30 year old male with PMHx significant for alcohol use disorder who has had a prolonged hospitalization at [facility] in [place] for acute alcohol hepatitis/end stage liver disease complicated by hepatic encephalopathy, acute renal failure requiring hemodialysis, acute hypoxemic respiratory failure and septic shock secondary to acute necrotizing pancreatitis. Transferred to [facility] ICU on 6/28/2021 for further management of necrotizing pancreatitis    Notable History/Conditions End-Stage disease;Organ failure   Assessment alert, lethargic, BP stable   Interventions No interventions   Patient Outcome   Patient Outcome Stabilized on unit   RRT Team   Attending/Primary/Covering Physician Daniela 3   Date Attending Physician notified 07/15/21   Time Attending Physician notified 2314   Physician(s) Millie Akers   Lead RN Rafael Chavez   Post RRT Intervention Assessment   Post RRT Assessment Stable/Improved   Date Follow Up Done 07/16/21   Time Follow Up Done 0153

## 2021-07-16 NOTE — PROGRESS NOTES
Pt triggered sepsis BPA due to increased HR, elevated WBC and temp 100.8. Repeat vitals unchanged, lactic acid 2.1, RRT called per protocol. Primary team notified with no new orders at this time as pt is already being treated for sepsis. Nursing will continue to monitor.

## 2021-07-17 LAB
ALBUMIN SERPL-MCNC: 2.3 G/DL (ref 3.4–5)
ALP SERPL-CCNC: 175 U/L (ref 40–150)
ALT SERPL W P-5'-P-CCNC: 20 U/L (ref 0–70)
ANION GAP SERPL CALCULATED.3IONS-SCNC: 10 MMOL/L (ref 3–14)
ANION GAP SERPL CALCULATED.3IONS-SCNC: 11 MMOL/L (ref 3–14)
AST SERPL W P-5'-P-CCNC: 87 U/L (ref 0–45)
BILIRUB SERPL-MCNC: 7.5 MG/DL (ref 0.2–1.3)
BUN SERPL-MCNC: 51 MG/DL (ref 7–30)
BUN SERPL-MCNC: 57 MG/DL (ref 7–30)
C DIFF TOX B STL QL: NEGATIVE
CALCIUM SERPL-MCNC: 7.9 MG/DL (ref 8.5–10.1)
CALCIUM SERPL-MCNC: 8.3 MG/DL (ref 8.5–10.1)
CHLORIDE BLD-SCNC: 97 MMOL/L (ref 94–109)
CHLORIDE BLD-SCNC: 99 MMOL/L (ref 94–109)
CO2 SERPL-SCNC: 20 MMOL/L (ref 20–32)
CO2 SERPL-SCNC: 20 MMOL/L (ref 20–32)
CREAT SERPL-MCNC: 3.77 MG/DL (ref 0.66–1.25)
CREAT SERPL-MCNC: 4.08 MG/DL (ref 0.66–1.25)
ERYTHROCYTE [DISTWIDTH] IN BLOOD BY AUTOMATED COUNT: 20.7 % (ref 10–15)
GFR SERPL CREATININE-BSD FRML MDRD: 18 ML/MIN/1.73M2
GFR SERPL CREATININE-BSD FRML MDRD: 20 ML/MIN/1.73M2
GLUCOSE BLD-MCNC: 136 MG/DL (ref 70–99)
GLUCOSE BLD-MCNC: 142 MG/DL (ref 70–99)
HCT VFR BLD AUTO: 22.3 % (ref 40–53)
HGB BLD-MCNC: 7.3 G/DL (ref 13.3–17.7)
INR PPP: 1.8 (ref 0.85–1.15)
LACTATE SERPL-SCNC: 1.5 MMOL/L (ref 0.7–2)
MCH RBC QN AUTO: 31.9 PG (ref 26.5–33)
MCHC RBC AUTO-ENTMCNC: 32.7 G/DL (ref 31.5–36.5)
MCV RBC AUTO: 97 FL (ref 78–100)
PLATELET # BLD AUTO: 225 10E3/UL (ref 150–450)
POTASSIUM BLD-SCNC: 5 MMOL/L (ref 3.4–5.3)
POTASSIUM BLD-SCNC: 5.1 MMOL/L (ref 3.4–5.3)
PROT SERPL-MCNC: 7 G/DL (ref 6.8–8.8)
RBC # BLD AUTO: 2.29 10E6/UL (ref 4.4–5.9)
SODIUM SERPL-SCNC: 127 MMOL/L (ref 133–144)
SODIUM SERPL-SCNC: 130 MMOL/L (ref 133–144)
WBC # BLD AUTO: 27.3 10E3/UL (ref 4–11)

## 2021-07-17 PROCEDURE — 250N000013 HC RX MED GY IP 250 OP 250 PS 637: Performed by: INTERNAL MEDICINE

## 2021-07-17 PROCEDURE — 36592 COLLECT BLOOD FROM PICC: CPT | Performed by: INTERNAL MEDICINE

## 2021-07-17 PROCEDURE — 85027 COMPLETE CBC AUTOMATED: CPT | Performed by: INTERNAL MEDICINE

## 2021-07-17 PROCEDURE — 83605 ASSAY OF LACTIC ACID: CPT | Performed by: INTERNAL MEDICINE

## 2021-07-17 PROCEDURE — 36592 COLLECT BLOOD FROM PICC: CPT

## 2021-07-17 PROCEDURE — 87493 C DIFF AMPLIFIED PROBE: CPT | Performed by: INTERNAL MEDICINE

## 2021-07-17 PROCEDURE — 85610 PROTHROMBIN TIME: CPT | Performed by: INTERNAL MEDICINE

## 2021-07-17 PROCEDURE — 250N000011 HC RX IP 250 OP 636: Performed by: INTERNAL MEDICINE

## 2021-07-17 PROCEDURE — 99233 SBSQ HOSP IP/OBS HIGH 50: CPT | Performed by: CLINICAL NURSE SPECIALIST

## 2021-07-17 PROCEDURE — 99233 SBSQ HOSP IP/OBS HIGH 50: CPT | Mod: GC

## 2021-07-17 PROCEDURE — 120N000005 HC R&B MS OVERFLOW UMMC

## 2021-07-17 PROCEDURE — 80053 COMPREHEN METABOLIC PANEL: CPT | Performed by: INTERNAL MEDICINE

## 2021-07-17 PROCEDURE — 99232 SBSQ HOSP IP/OBS MODERATE 35: CPT | Performed by: PHYSICIAN ASSISTANT

## 2021-07-17 PROCEDURE — 250N000009 HC RX 250: Performed by: INTERNAL MEDICINE

## 2021-07-17 PROCEDURE — 82040 ASSAY OF SERUM ALBUMIN: CPT | Performed by: INTERNAL MEDICINE

## 2021-07-17 RX ORDER — HYDROMORPHONE HYDROCHLORIDE 1 MG/ML
1-2 SOLUTION ORAL
Status: DISCONTINUED | OUTPATIENT
Start: 2021-07-17 | End: 2021-08-04

## 2021-07-17 RX ADMIN — Medication 550 MG: at 19:45

## 2021-07-17 RX ADMIN — HYDROXYZINE HYDROCHLORIDE 50 MG: 25 TABLET, FILM COATED ORAL at 16:48

## 2021-07-17 RX ADMIN — MEROPENEM 500 MG: 500 INJECTION, POWDER, FOR SOLUTION INTRAVENOUS at 19:45

## 2021-07-17 RX ADMIN — HYDROMORPHONE HYDROCHLORIDE 2 MG: 1 SOLUTION ORAL at 13:48

## 2021-07-17 RX ADMIN — MELATONIN TAB 3 MG 6 MG: 3 TAB at 21:24

## 2021-07-17 RX ADMIN — PROCHLORPERAZINE EDISYLATE 5 MG: 5 INJECTION INTRAMUSCULAR; INTRAVENOUS at 14:28

## 2021-07-17 RX ADMIN — PROCHLORPERAZINE EDISYLATE 5 MG: 5 INJECTION INTRAMUSCULAR; INTRAVENOUS at 21:24

## 2021-07-17 RX ADMIN — HYDROMORPHONE HYDROCHLORIDE 2 MG: 1 SOLUTION ORAL at 01:52

## 2021-07-17 RX ADMIN — HYDROMORPHONE HYDROCHLORIDE 2 MG: 1 SOLUTION ORAL at 09:10

## 2021-07-17 RX ADMIN — Medication 40 MG: at 10:42

## 2021-07-17 RX ADMIN — HYDROMORPHONE HYDROCHLORIDE 2 MG: 1 SOLUTION ORAL at 22:37

## 2021-07-17 RX ADMIN — HYDROMORPHONE HYDROCHLORIDE 2 MG: 1 SOLUTION ORAL at 19:45

## 2021-07-17 RX ADMIN — ONDANSETRON 4 MG: 2 INJECTION INTRAMUSCULAR; INTRAVENOUS at 16:48

## 2021-07-17 RX ADMIN — HYDROMORPHONE HYDROCHLORIDE 2 MG: 1 SOLUTION ORAL at 16:48

## 2021-07-17 RX ADMIN — ONDANSETRON 4 MG: 2 INJECTION INTRAMUSCULAR; INTRAVENOUS at 09:24

## 2021-07-17 RX ADMIN — Medication 550 MG: at 10:43

## 2021-07-17 ASSESSMENT — ACTIVITIES OF DAILY LIVING (ADL)
ADLS_ACUITY_SCORE: 17
ADLS_ACUITY_SCORE: 18
ADLS_ACUITY_SCORE: 17
ADLS_ACUITY_SCORE: 17
ADLS_ACUITY_SCORE: 18
ADLS_ACUITY_SCORE: 18

## 2021-07-17 ASSESSMENT — MIFFLIN-ST. JEOR: SCORE: 1679.75

## 2021-07-17 NOTE — PLAN OF CARE
Neuro: AxOx4, follows commands, PERRLA 3 Brisk, moves ext. Has 7/10 abdominal and back pain, gave dilaudid 2mg x2 overnight  Cardiac: Sinus Tach 100-120's, BP systolic 100-120's. Afebrile  Resp: RA 02 saturation 92% and above  GI/: Pt had several watery bowel movements overnight x8. PT at goal on tubefeed 65 ml/hr, had periods of intermittent nausea, received zofran and compazine overnight, currently denies nausea or vomiting  Skin: No new deficits noted  LDA's: Left upper arm triple lumen PICC. G/J tube with tube feed infusing, meds given through J tube. Left abdominal drain. Q8 hr irrigation    Pt refusing lactulose, educated on the benefits of lactulose.     Plan: Continue with POC. Notify team of any changes.

## 2021-07-17 NOTE — PROGRESS NOTES
[facility] PROGRESS NOTE     Patient:  Dax Villareal   Date of birth 1990, Medical record number [id number]  Date of Visit:  07/17/2021  Date of Admission: 6/28/2021  Consult Requester:Patricia Baker, *          Assessment and Plan:   RECOMMENDATION:  1. Continue antibiotic coverage with meropenem, currently planned through 7/18.   2. If patient clinically decompensates, would escalate antibiotic coverage with addition of daptomcyin and micafungin.   3. Will continue to follow pending cultures. No focal source of fever yet identified.    ASSESSMENT:  Dax Villareal is a 30 year old male with PMHx significant for alcohol use disorder who was admitted to [facility] in [place], [place] for abdominal pain, nausea and vomiting, found to have end stage liver disease c/b hepatic encephalopathy, acute renal failure requiring iHD, SBP and acute necrotizing pancreatitis.      Afebrile on admission, however has spiked some low grade intermittent fevers up to 100.9 on 7/1-2 then defervesced. Has been afebrile since. WBC significantly elevated on admission to 50K with slow downtrend over the past few weeks, currently 17K. Lactate intermittently elevated into the 2-3 range, otherwise wnl. Infectious work up since admission to [facility] has remained negative. BCx negative. Drain placement pancreatic cultures collected 6/30 NGTD/negative. 7/2 Ascites fluid cultures negative, labs, labs included WBC 2663/74% neutrophils. Repeat ascites fluid cultures 7/5 with 1801 WBC/61% neutrophils, cultures negative. 7/13 ascites fluid cultures NGTD. See imaging section below for review.      Started on meropenem (6/29- current), micafungin (6/29-7/1), and given 1x dose of IV Vancomycin (6/29). On Rifaximin.  Has completed 16 days of broad spectrum abx while admitted to [facility] as of 7/14. Reasonable to continue coverage on antibiotics (meropenem or cipro/flagyl per GI) for the next 4-5 days post replacement of perc  drain then would discontinue 7/18. This will give him a total of 21 days of broad spectrum antibiotics post initial perc drain placement for necrotizing pancreatitis with pseudocyst. Cultures remain largely negative. Abscess culture with growth of VRE.     Had fever on 7/15 bianca which could have been a post procedural fever vs uncovered infection. Has rapidly defervesced without intervention, repeat infectious w/u NGTD. Continue to monitor.     Thank you for this consult. ID will continue to follow.      Patient was discussed with Dr. Pacheco.      Tavia Romano PA-C  Infectious Diseases  Pager #[phone]         Interim History and Events:   Dax states he is feeling slightly better than yesterday but notes continued abdominal discomfort and nausea. Notes frequent stooling as well. Denies SOB or trouble breathing. No new sx.         HPI:   Dax Villareal is a 30 year old male with PMHx significant for alcohol use disorder who was admitted to [facility] in [place], [place] for abdominal pain, nausea and vomiting, found to have end stage liver disease c/b hepatic encephalopathy, acute renal failure requiring iHD, SBP and acute necrotizing pancreatitis.      He required respiratory support (BIPAP, ? Intubation) for respiratory failure and appeared to be in sepsis (lactate reported at 7.7 with abnormal LFTs). He was transferred to [facility] for prolonged admission and reportedly treated with steroids; kidney function and pancreatic enzymes normal on admission). Length of stay stated >40 days. CT imaging 6/19 with concern for necrotizing pancreatitis with follow up CT scan 6/25 with peripancreatic fluid collection and 9 cm soft tissue/hemorrhagic structure c/f internal hemorrhage of a pseudocyst. Given multiple antibiotics (Cefepime, Flagyl, Ciprofloxacin, micafungin). Hospital course reportedly complicated by hypoxemic respiratory failure requiring BIPAP, ARF requiring iHD, hepatic encephalopathy, and septic  "shock 2/2 necrotizing pancreatitis/pancreatic pseudocyst requiring pressor support. Ultimately transferred to [facility] for interventions 6/28. Admitted to the ICU.      Now status post perc IR drain placement 6/29 with cultures that remain NGTD/negative. No endoscopic drainage by GI was pursued given improvement in imaging 7/11. Additionally has had 3 paracenteses (7/2, 7/5, and 7/13 with negative culture work up. Had PEGJ placed 7/13.      He is an outdoorsman. Picks mushrooms, goes fishing/hunting, and hiking. Has not been since \"last spring\". Denies feeling ill prior to admission to OSH. Feeling fairly well currently although notes diffuse abdominal pain. Has had multiple procedures within the past few days and states he feels like he needs a \"rest day\". Denies fever/chills. No URI sx. Is having diarrhea, but on lactulose. Anuric.          ROS:   -Focused 5 point ROS completed, pertinent positives and negatives listed above.    Physical Examination:  Temp: 97.6  F (36.4  C) Temp src: Oral BP: 104/48 Pulse: (!) 124   Resp: 18 SpO2: 98 % O2 Device: None (Room air)      Vitals:    07/13/21 0011 07/14/21 0010 07/15/21 0016 07/16/21 0546   Weight: 69 kg (152 lb 1.9 oz) 68.2 kg (150 lb 5.7 oz) 73.5 kg (162 lb 0.6 oz) 74 kg (163 lb 2.3 oz)    07/17/21 0500   Weight: 78.2 kg (172 lb 6.4 oz)       Constitutional: Pleasant male patient seen lying in bed, in NAD. Alert and interactive.   HEENT: NCAT, icterus, conjunctiva clear. Moist mucous membranes.  Respiratory: Non-labored breathing on RA.  GI: Normoactive BS. Abdomen soft, non distended.   Skin: Warm and dry. Jaundiced. No rashes, scattered petechiae on torso.  Musculoskeletal: Extremities grossly normal. No tenderness or edema present.   Neurologic: A &O x3, speech normal, answering questions appropriately. Moves all extremities spontaneously. Grossly non-focal.  Neuropsychiatric: Mentation and affect normal/appropriate, somewhat flat.  VAD: CVC is c/d/i with no erythema, " drainage, or tenderness.      Medications:    sodium chloride 0.9%  250 mL Intravenous Once in dialysis/CRRT     sodium chloride 0.9%  300 mL Hemodialysis Machine Once     B and C vitamin Complex with folic acid  5 mL Per Feeding Tube Daily     folic acid  1 mg Oral Daily    Or     folic acid  1 mg Intravenous Daily     lactulose  20 g Oral or NG Tube 4x Daily     melatonin  6 mg Oral At Bedtime     meropenem  500 mg Intravenous Q24H     - MEDICATION INSTRUCTIONS -   Does not apply Once     pantoprazole  40 mg Oral QAM AC     potassium chloride  20 mEq Oral BID     rifaximin  550 mg Oral BID     sodium chloride (PF)  10 mL Irrigation Q8H     sodium chloride (PF)  9 mL Intracatheter During Dialysis/CRRT (from stock)     sodium chloride (PF)  9 mL Intracatheter During Dialysis/CRRT (from stock)     vitamin B1  100 mg Oral Daily    Or     thiamine  100 mg Intravenous Daily       Infusions/Drips:    - MEDICATION INSTRUCTIONS -       dextrose         Laboratory Data:   No results found for: ACD4    Inflammatory Markers    Recent Labs   Lab Test 10/06/20  0927   CRP 16.9*       Metabolic Studies       Recent Labs   Lab Test 07/17/21  0507 07/17/21  0506 07/16/21  0616 07/15/21  2214 07/15/21  0811 07/15/21  0443 07/14/21  0345 07/13/21  1050 07/13/21  0718 07/13/21  0718 07/12/21  2123 07/12/21  2105   NA  --  130* 130*  130*  --   --  131* 130*  --   --  131*  --  130*   POTASSIUM  --  5.0 4.0  --   --  3.3* 4.2  --   --  3.6  --  3.7   CHLORIDE  --  99 99  --   --  98 98  --   --  98  --  97   CO2  --  20 20  --   --  23 20  --   --  23  --  21   ANIONGAP  --  11 11  --   --  10 12  --   --  10  --  12   BUN  --  51* 39*  --   --  26 42*  --   --  29  --  21   CR  --  3.77* 3.23*  --   --  2.30* 3.35*  --   --  2.60*  --  2.06*   GFRESTIMATED  --  20* 24*  --   --  36* 23*  --   --  31*  --  42*   GLC  --  136* 151*  --  126* 131* 137* 101*  --  94  --  91   JANENE  --  7.9* 7.8*  --   --  8.1* 8.6  --   --  8.5  --  8.6    PHOS  --   --  2.0*  --   --  3.0 4.6*  --   --  4.3  --  3.4   MAG  --   --   --   --   --  1.6 1.6  --   --  1.5*   < >  --    LACT 1.5  --   --  2.1*  --   --   --   --    < > 0.9  --   --     < > = values in this interval not displayed.       Hepatic Studies    Recent Labs   Lab Test 07/17/21  0506 07/15/21  0443 07/14/21  0345 07/13/21  0718 07/12/21  2105 07/11/21  0607   BILITOTAL 7.5* 9.3* 11.1* 13.9* 14.6* 17.3*   ALKPHOS 175* 182* 174* 167* 169* 177*   ALBUMIN 2.3* 2.7* 2.6* 2.6* 2.7* 2.8*   AST 87* 86* 54* 60* 60* 79*   ALT 20 16 12 15 17 21       Pancreatitis testing    Recent Labs   Lab Test 06/29/21 0410 06/28/21 1839 05/18/21  1220 12/09/20  1504 10/06/20  0927   LIPASE  --  199 381 140 139   TRIG Unsatisfactory specimen - icteric Canceled, Test credited  --   --   --        Hematology Studies      Recent Labs   Lab Test 07/17/21  0506 07/16/21  0616 07/15/21  0443 07/14/21  2240 07/14/21  0342 07/13/21  0718 07/12/21  2105 06/29/21  0410 06/28/21  1839 05/18/21  1220 12/09/20  1504 10/06/20  0927   WBC 27.3* 26.7* 20.3*  --  17.7* 21.3* 24.6*  --  50.6* 13.2* 4.2 6.9   ANEU  --   --   --   --   --   --   --   --  44.0* 12.0* 2.1 5.2   ALYM  --   --   --   --   --   --   --   --  0.0* 0.6* 1.3 0.8   MEDHAT  --   --   --   --   --   --   --   --  2.0* 0.4 0.6 0.8   AEOS  --   --   --   --   --   --   --   --  0.5 0.0 0.2 0.1   HGB 7.3* 7.5* 8.0* 7.9* 6.9*  6.9* 6.7* 7.4*   < > 7.6* 16.6 17.2 18.8*   HCT 22.3* 23.0* 24.2*  --  20.7* 20.4* 21.3*  --  23.4* 44.8 47.7 51.2    231 219  --  213 211 214   < > 127* 222 299 311    < > = values in this interval not displayed.       Arterial Blood Gas Testing    Recent Labs   Lab Test 07/01/21  0014 06/30/21  1716 06/28/21  1839   O2PER 60.0 5L 2L        Urine Studies     Recent Labs   Lab Test 12/09/20  1817 10/06/20  1214   URINEPH 7.0 7.0   NITRITE Negative Negative   LEUKEST Negative Negative   WBCU 0 1       Vancomycin Levels     Recent Labs   Lab  Test 21  0700   VANCOMYCIN 31.2*       Tobramycin levels     No lab results found.    Gentamicin levels    No lab results found.    CSF testing   No lab results found.      Microbiology:  Culture Micro   Date Value Ref Range Status   2021 No growth  Final   2021 Culture negative after 1 week  Preliminary   2021 No growth  Final   2021 No growth  Final   2021 No growth  Final   2021 No growth  Final   2021 No anaerobes isolated  Final   2021 No growth  Final   2021 No growth  Final   2021 No growth after 6 days  Final   2021 No growth after 6 days  Final       Last check of C difficile  No results found for: CDBPCT    Imagin/16 CXR  IMPRESSION: Unchanged mixed interstitial and airspace opacities most  prominent in the perihilar and bibasilar regions.  1.  Stable support equipment.     CT AP w/o contrast   IMPRESSION:   1. Sequela of necrotizing pancreatitis with highly decreased size of  walled off necrosis centered in the lesser sac. Percutaneous drain  remains in place.  2. Loculated moderate ascites is unchanged.  3. Hepatomegaly, hepatic steatosis, and evidence of portal  hypertension again seen.  4. Small bilateral pleural effusions are slightly improved.      CT AP w/o contrast   IMPRESSION:   1. Status post placement of a left lateral approach drainage catheter,  substantially decreased but persistent hemorrhagic walled off necrosis  centered in the lesser sac. A smaller collection along the inferior  aspect of the third portion of the duodenum is minimally changed.  2. Minimally changed moderate to large volume loculated ascites.  3. Hepatomegaly and hepatic steatosis with evidence of cirrhosis and  portal hypertension.  4. Diffuse edematous wall thickening of the small and large bowel,  likely reactive. No bowel obstruction.  5. Small right and trace left pleural effusions with adjacent  compressive atelectasis, similar to  prior. Small pericardial effusion.  6. Faint patchy ground glass opacities in the right middle lobe and  and right upper lobe, likely infectious/inflammatory.     6/29 CT AP w/ contrast   IMPRESSION:   1.  Sequela of necrotizing pancreatitis with peripancreatic necrotic  fluid collection, large volume abdominal ascites and mesenteric edema.  2.  Thickened, edematous small bowel wall is likely due to reactive  enteritis secondary to pancreatitis.  3.  Hepatomegaly with steatosis and mild surface nodularity,  associated with splenomegaly and recanalized paraumbilical vein,  suggestive of cirrhosis with portal hypertension.  4.  Enlarged hypoenhancing kidneys with loss of corticomedullary  differentiation, suggestive of acute renal parenchymal disease. No  hydronephrosis or perinephric fat stranding.  5.  Small right and trace left pleural effusions, interlobular septal  thickening, and diffuse anasarca, likely related to fluid third  spacing.

## 2021-07-17 NOTE — PLAN OF CARE
"[id number]    Neuro checks remained intact. Patient more alert than prior during shift.  Following commands.  Pupils round and equal.  Patient continued to refuse Lactulose t/o shift.  Educated the importance of Lactulose and risk of refusing.  Patient stated \"I had like 20 bowel movements yesterday\".  Continue to encourage.  Gave PO dilaudid 1x during shift with decreased in pain noted.  Patient requesting to hold off on any further care t/o to friends visiting during shift.  TF increased back to goal rate via J-tube @ 65 ml/hr. No nausea/vomiting since AM.  "

## 2021-07-17 NOTE — PROGRESS NOTES
[facility]    Progress Note - Daniela 3 Service        Date of Admission:  6/28/2021    Assessment & Plan   Dax Villareal is a 30 year old male with hx of alcohol use disorder admitted on 6/28/2021 after recent prolonged admission at [facility] for acute alcoholic hepatitis who has ESLD c/b HE, ARF requiring HD, and SBP and malnutrition in the setting of acute necrotizing pancreatitis, s/p PEG-J placement on 7/13. He arrived with acute respiratory failure and septic shock that have resolved, and his SBP has been appropriately treated. He requires continued management for ARF, hepatic encephalopathy, infected pancreatic pseudocyst, and nutritional support via PEG-J.       Changes today:   - Dialysis today   - Checking C. Diff   - Increased prn IV Dilaudid from q4h prn- > 1-2mg q3h prn   - Continue TFs     #Necrotizing pancreatitis w/ infected pseudocyst s/p RP Drain, improving  #Septic Shock, resolved   #SBP, resolved  #Leukocytosis, acute rise iso down trending WBCs  #Peripancreatic fluid collection growing VRE   Etiology of the septic shock that he came in with likely due to necrotizing pancreatitis, infected pseudocyst, and SBP. Had percutaneous RP drain placed for drainage of pancreatic collection. Diagnostic para returned with SBP. Repeat CT 7/11 showed significant decrease in size of walled off necrosis and unchanged moderate ascites. S/p therapeutic paracentesis 7/13 (1L removed).     7/15-7/16 overnight, pt febrile to 100.8 and has increased leukocytosis and tachycardia. Underwent infectious work up (CXR, diagnostic para, sputum/blood cultures) all of which were unrevealing. Clinically, ongoing diffuse tenderness, but less somnolent today. Did have frequent stooling 7/16-7/17 despite refusing lactulose.     - Continue meropenem 500 mg q24h (Total 21 day course - Through 7/19), spoke with ID today will not change abx for now   - Check C. Diff    - Daily CBC  - MAP goal > 65   - GI following, appreciate recs   - For back pain: Tylenol PRN and Dilaudid 1-2mg q3h PRN  - Will need cipro ppx for SBP after discontinuing Meropenem     # Severe malnutrition in setting of chronic illness  PEG-J placed 7/13. Procedure went well, patient reached goal of 65 mL/hr but had large emesis 7/16 AM, TF turned off. Restarted TF at 40 mL/hr, will increase today as pt tolerates. FWF 30 mL q4h. Will keep G tube open/vented.   - Dietitian consult for new tube feeds, appreciate recs              - Continue TFs, will work on consolidating in coming days   - Regular Diet, thin liquids per SLP  - Continue multivitamins  - Pantoprazole 40 mg IV daily  - Zofran PRN     # Decompensated EtOH cirrhosis c/b hepatic encephalopathy and recurrent ascites  # Hx of Alcohol use disorder c/b withdrawal   # Anemia  # Coagulopathy   Patient with a history of marked alcohol use and alcoholic hepatitis in the past. Was given a course of steroids (1 week) during previous admission at OSH. Patient mental status has been stable, alert and oriented x4 for a week, though on 7/16 patient is very fatigued. Still seems clear from an HE standpoint, well managed on lactulose regimen. S/p pRBC infusion x2 over the past few days d/t a drop to 6.7 on 7/13 (etilogy likely chronic liver disease, chronic kidney disease, phlebotomy, dilution, poor nutrition, ect), Hgb now stable at 7.5, anticipate another pRBC infusion this weekend.   - Lactulose 20g QID and PRN and rifaximin 550mg BID (titrate to 1.5L stool/day)  - Trend MELD labs  - Daily folic acid and thiamine  - Delirium precautions  - Melatonin 6mg at bedtime  - Daily CBC   - Transfuse if hemoglobin < 7, platelet < 10 or < 30 with bleeding  - Continue to monitor for signs of bleeding     MELD-Na score: 35 at 7/17/2021  5:06 AM  MELD score: 33 at 7/17/2021  5:06 AM  Calculated from:  Serum Creatinine: 3.77 mg/dL at 7/17/2021  5:06 AM  Serum Sodium: 130 mmol/L  at 7/17/2021  5:06 AM  Total Bilirubin: 7.5 mg/dL at 7/17/2021  5:06 AM  INR(ratio): 1.80 at 7/17/2021  5:06 AM  Age: 30 years     # ARF on HD (MWF)  # Hypervolemia causing acute respiratory failure, resolved  Patient febrile to 100.8 overnight on 7/16, dialysis pushed from today to Saturday 7/17. K increased to 5 today.      > Hemodialysis today (Typically MWF, but pushed yesterday due to tachycardia)               - Midodrine 10 mg PRN before dialysis sessions     > Daily chemistry labs, strict I/O, and daily weights  - Aspiration precautions  - Supplemental oxygen as needed to keep sats above 89%     # Goals of Care  Please see additional interval note for in depth details regarding conversation on 7/9 about goals of care. Patient's long term prognosis is still quite poor given his multi organ failure. Care conference on 7/15, pt did not wish to participate. Met with sisters Noni (on phone) and Melyssa and close friend Leroy with palliative, nephrology and general medicine teams today to discuss Dax's current improving status, poor prognosis and wishes to continue full cares. Plan to meet Fridays at 4 pm with teams and family for weekly care conferences.     Diet: Adult Formula Drip Feeding: Continuous Vital 1.5; Jejunostomy; Goal Rate: 65; mL/hr; Medication - Feeding Tube Flush Frequency: At least 15-30 mL water before and after medication administration and with tube clogging; Amount to Send (Nutrition us...  2 Gram Sodium Diet  Snacks/Supplements Adult: Ensure Clear; With Meals    DVT Prophylaxis: Pneumatic Compression Devices  Rdz Catheter: Not present  Fluids: 5 mL NS TKO  Central Lines: PRESENT  PICC Triple Lumen 06/28/21 Left-Site Assessment: WDL  CVC Double Lumen Right-Site Assessment: WDL  Code Status: Full Code      Disposition Plan   Expected discharge: >7 days, recommended to transitional care unit once mental status at baseline, renal function and nutrition improved and necrotizing pancreatitis is  well managed.     The patient's care was discussed with the Attending Physician, Dr. Baker.    Clarissa Cristofer   IM-Peds PGY-1   [address]  Securely message with the Vocera Web Console (learn more here)  Text page via [facility] Paging/Directory  Please see sign in/sign out for up to date coverage information  ____________________________________________________________________    Interval History   Overnight, no acute events. Did have frequent stooling despite refusing lactulose.     More alert this AM compared to yesterday, asking to be left alone, complaining of all over pain (unable to localize). No shortness of breath, cough. Nausea improved, no further emesis since yesterday. Did have some PO intake.     Data reviewed today: I reviewed all medications, new labs and imaging results over the last 24 hours.     Physical Exam   Vital Signs: Temp: 97.6  F (36.4  C) Temp src: Oral BP: 104/48 Pulse: (!) 124   Resp: 18 SpO2: 98 % O2 Device: None (Room air)    Weight: 172 lbs 6.4 oz     Gen: Lying down in bed,  HEENT: Conjunctival icterus  CV: Tachycardic. Regular rhythm.   Resp: Breathing comfortably on RA  Abd: Soft, distended, Diffuse tenderness throughout all quadrants though L>R, PEG-J in place without significant surrounding erythema   Skin: Jaundiced.  Ext: Well perfused, no edema  Neuro: oriented x4. Less somnolent today. No significant asterixis.      Data   Recent Labs   Lab 07/17/21  0506 07/16/21  0616 07/15/21  0811 07/15/21  0443   WBC 27.3* 26.7*  --  20.3*   HGB 7.3* 7.5*  --  8.0*   MCV 97 97  --  96    231  --  219   INR 1.80* 1.77*  --  1.60*   * 130*  130*  --  131*   POTASSIUM 5.0 4.0  --  3.3*   CHLORIDE 99 99  --  98   CO2 20 20  --  23   BUN 51* 39*  --  26   CR 3.77* 3.23*  --  2.30*   ANIONGAP 11 11  --  10   JANENE 7.9* 7.8*  --  8.1*   * 151* 126* 131*   ALBUMIN 2.3*  --   --  2.7*   PROTTOTAL 7.0  --   --  7.6    BILITOTAL 7.5*  --   --  9.3*   ALKPHOS 175*  --   --  182*   ALT 20  --   --  16   AST 87*  --   --  86*     No results found for this or any previous visit (from the past 24 hour(s)).

## 2021-07-17 NOTE — PROGRESS NOTES
"1620:  Patient complaining about feeling \"terrbile\".  Emesis at 1619.  Pt also complaining of increased pain to abdomen.  This writer attempted to get more details about current episode.  However, this writer stated \"I cannot answer questions\".    Pt also stating at this time that he does not want to go to dialysis related to his current state.      Primary team paged to make aware.    "

## 2021-07-17 NOTE — PROGRESS NOTES
Nephrology Progress Note  07/17/2021         Dax Villareal is a 30 year old male with h/o ETOH hepatitis, end stage liver disease, RADHA on HD, transferred from [facility] in [place] for septic shock on 6/28/21 for treatment of necrotizing pancreatitis and decompensated liver disease. He was initially admitted to [facility] 5/19/21.        Interval History :   Mr Villareal had HD run held yesterday due to fevers and tachycardia, still a bit tachycardic and WBC is up but no fevers today.  Plan to do short run to correct K before it becomes abnormal, minimal UF with tachycardia.        Assessment & Recommendations:   RADHA-Cr was 0.8 as recently as 5/19/2021 but now HD dependent, started RRT at [facility] prior to transfer to [facility] (exact date unclear but per family it was end of May).  Etiology likely multifactorial with contrast, sepsis/pancreatitis, likely HRS physiology contributing as well.  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  Continuing RRT as long as it is tolerated with regards to BP's, is functional enough to possibly do outpt HD.  MELD is in high 30's so has high 3 month mortality but continuing restorative cares for now.                -Line is TDC from [facility]               -Short HD today to prevent hyperkalemia, a bit tachycardic so will be conservative with UF.       Volume status-Anuric, no major edema on exam, wt is down nearly 10kg since admission.  Doing short HD today with minimal UF with tachycardia although fevers have resolved.      Electrolytes/pH-K 5.0, bicarb 20, no acute issues.      Ca/phos/pth-Ca 7.9, corrects to normal with low albumin.       Anemia-Hgb 7.3, acute management per team, last PRBC's 7/10.       Nutrition-Taking PO, appetite ok.        Recommendations were communicated to primary team via verbal communication.        SOFIA Marlow CNS  Clinical Nurse Specialist  [phone]    Review of Systems:   I reviewed the following systems:  Gen: No fevers or chills  CV: No  "CP at rest  Resp: No SOB at rest  GI: No N/V    Physical Exam:   I/O last 3 completed shifts:  In: 1612 [P.O.:200; Other:40; NG/GT:237]  Out: 165 [Urine:50; Drains:115]   BP 93/47 (BP Location: Right arm)   Pulse (!) 125   Temp 97.6  F (36.4  C) (Oral)   Resp 18   Ht 1.676 m (5' 6\")   Wt 78.2 kg (172 lb 6.4 oz)   SpO2 99%   BMI 27.83 kg/m       GENERAL APPEARANCE: Seen on HD, in no distress.    EYES:  scleral icterus, pupils equal  Pulmonary: lungs clear to auscultation. Off supplemental oxygen   CV: tachy   - Edema - no LE edema  GI: large distended, tender. Has retroperitoneal drain  MS: no evidence of inflammation in joints, no muscle tenderness  SKIN: no rash, warm, dry, no cyanosis  NEURO: alert/interactive  ACCESS: Right TDC     Labs:   All labs reviewed by me  Electrolytes/Renal - Recent Labs   Lab Test 07/17/21  0506 07/16/21  0616 07/15/21  0811 07/15/21  0443 07/14/21  0345 07/13/21  0718   * 130*  130*  --  131* 130* 131*   POTASSIUM 5.0 4.0  --  3.3* 4.2 3.6   CHLORIDE 99 99  --  98 98 98   CO2 20 20  --  23 20 23   BUN 51* 39*  --  26 42* 29   CR 3.77* 3.23*  --  2.30* 3.35* 2.60*   * 151* 126* 131* 137* 94   JANENE 7.9* 7.8*  --  8.1* 8.6 8.5   MAG  --   --   --  1.6 1.6 1.5*   PHOS  --  2.0*  --  3.0 4.6* 4.3       CBC -   Recent Labs   Lab Test 07/17/21  0506 07/16/21  0616 07/15/21  0443   WBC 27.3* 26.7* 20.3*   HGB 7.3* 7.5* 8.0*    231 219       LFTs -   Recent Labs   Lab Test 07/17/21  0506 07/15/21  0443 07/14/21  0345   ALKPHOS 175* 182* 174*   BILITOTAL 7.5* 9.3* 11.1*   ALT 20 16 12   AST 87* 86* 54*   PROTTOTAL 7.0 7.6 7.4   ALBUMIN 2.3* 2.7* 2.6*       Iron Panel - No lab results found.        Current Medications:    sodium chloride 0.9%  250 mL Intravenous Once in dialysis/CRRT     sodium chloride 0.9%  300 mL Hemodialysis Machine Once     B and C vitamin Complex with folic acid  5 mL Per Feeding Tube Daily     folic acid  1 mg Oral Daily    Or     folic acid  1 " mg Intravenous Daily     lactulose  20 g Oral or NG Tube 4x Daily     melatonin  6 mg Oral At Bedtime     meropenem  500 mg Intravenous Q24H     - MEDICATION INSTRUCTIONS -   Does not apply Once     pantoprazole  40 mg Oral QAM AC     potassium chloride  20 mEq Oral BID     rifaximin  550 mg Oral BID     sodium chloride (PF)  10 mL Irrigation Q8H     sodium chloride (PF)  9 mL Intracatheter During Dialysis/CRRT (from stock)     sodium chloride (PF)  9 mL Intracatheter During Dialysis/CRRT (from stock)     vitamin B1  100 mg Oral Daily    Or     thiamine  100 mg Intravenous Daily       - MEDICATION INSTRUCTIONS -       dextrose

## 2021-07-18 ENCOUNTER — APPOINTMENT (OUTPATIENT)
Dept: CT IMAGING | Facility: CLINIC | Age: 31
End: 2021-07-18
Attending: INTERNAL MEDICINE
Payer: MEDICAID

## 2021-07-18 LAB
ALBUMIN SERPL-MCNC: 2.2 G/DL (ref 3.4–5)
ALP SERPL-CCNC: 242 U/L (ref 40–150)
ALT SERPL W P-5'-P-CCNC: 29 U/L (ref 0–70)
ANION GAP SERPL CALCULATED.3IONS-SCNC: 11 MMOL/L (ref 3–14)
AST SERPL W P-5'-P-CCNC: 128 U/L (ref 0–45)
BACTERIA ABSC ANAEROBE+AEROBE CULT: ABNORMAL
BACTERIA FLD CULT: NO GROWTH
BILIRUB SERPL-MCNC: 7.4 MG/DL (ref 0.2–1.3)
BUN SERPL-MCNC: 68 MG/DL (ref 7–30)
CALCIUM SERPL-MCNC: 8.4 MG/DL (ref 8.5–10.1)
CHLORIDE BLD-SCNC: 97 MMOL/L (ref 94–109)
CO2 SERPL-SCNC: 20 MMOL/L (ref 20–32)
CREAT SERPL-MCNC: 4.46 MG/DL (ref 0.66–1.25)
ERYTHROCYTE [DISTWIDTH] IN BLOOD BY AUTOMATED COUNT: 19.9 % (ref 10–15)
GFR SERPL CREATININE-BSD FRML MDRD: 16 ML/MIN/1.73M2
GLUCOSE BLD-MCNC: 164 MG/DL (ref 70–99)
GRAM STAIN RESULT: ABNORMAL
GRAM STAIN RESULT: ABNORMAL
HCT VFR BLD AUTO: 22.6 % (ref 40–53)
HGB BLD-MCNC: 7.5 G/DL (ref 13.3–17.7)
INR PPP: 1.74 (ref 0.85–1.15)
LACTATE SERPL-SCNC: 1.6 MMOL/L (ref 0.7–2)
MCH RBC QN AUTO: 31.6 PG (ref 26.5–33)
MCHC RBC AUTO-ENTMCNC: 33.2 G/DL (ref 31.5–36.5)
MCV RBC AUTO: 95 FL (ref 78–100)
PLATELET # BLD AUTO: 251 10E3/UL (ref 150–450)
POTASSIUM BLD-SCNC: 5.6 MMOL/L (ref 3.4–5.3)
PROT SERPL-MCNC: 7.3 G/DL (ref 6.8–8.8)
RBC # BLD AUTO: 2.37 10E6/UL (ref 4.4–5.9)
SODIUM SERPL-SCNC: 128 MMOL/L (ref 133–144)
WBC # BLD AUTO: 25.2 10E3/UL (ref 4–11)

## 2021-07-18 PROCEDURE — 120N000005 HC R&B MS OVERFLOW UMMC

## 2021-07-18 PROCEDURE — 250N000011 HC RX IP 250 OP 636: Performed by: INTERNAL MEDICINE

## 2021-07-18 PROCEDURE — 250N000013 HC RX MED GY IP 250 OP 250 PS 637: Performed by: INTERNAL MEDICINE

## 2021-07-18 PROCEDURE — 80053 COMPREHEN METABOLIC PANEL: CPT | Performed by: INTERNAL MEDICINE

## 2021-07-18 PROCEDURE — 258N000003 HC RX IP 258 OP 636: Performed by: CLINICAL NURSE SPECIALIST

## 2021-07-18 PROCEDURE — 99233 SBSQ HOSP IP/OBS HIGH 50: CPT | Performed by: CLINICAL NURSE SPECIALIST

## 2021-07-18 PROCEDURE — 36592 COLLECT BLOOD FROM PICC: CPT | Performed by: INTERNAL MEDICINE

## 2021-07-18 PROCEDURE — 85014 HEMATOCRIT: CPT | Performed by: INTERNAL MEDICINE

## 2021-07-18 PROCEDURE — 74177 CT ABD & PELVIS W/CONTRAST: CPT | Mod: 26 | Performed by: RADIOLOGY

## 2021-07-18 PROCEDURE — 74177 CT ABD & PELVIS W/CONTRAST: CPT

## 2021-07-18 PROCEDURE — 250N000009 HC RX 250: Performed by: INTERNAL MEDICINE

## 2021-07-18 PROCEDURE — 85610 PROTHROMBIN TIME: CPT | Performed by: INTERNAL MEDICINE

## 2021-07-18 PROCEDURE — 90937 HEMODIALYSIS REPEATED EVAL: CPT

## 2021-07-18 PROCEDURE — 99233 SBSQ HOSP IP/OBS HIGH 50: CPT | Mod: GC

## 2021-07-18 PROCEDURE — 82040 ASSAY OF SERUM ALBUMIN: CPT | Performed by: INTERNAL MEDICINE

## 2021-07-18 PROCEDURE — 83605 ASSAY OF LACTIC ACID: CPT | Performed by: INTERNAL MEDICINE

## 2021-07-18 RX ORDER — IOPAMIDOL 755 MG/ML
119 INJECTION, SOLUTION INTRAVASCULAR ONCE
Status: COMPLETED | OUTPATIENT
Start: 2021-07-18 | End: 2021-07-18

## 2021-07-18 RX ADMIN — HYDROXYZINE HYDROCHLORIDE 50 MG: 25 TABLET, FILM COATED ORAL at 01:33

## 2021-07-18 RX ADMIN — PROCHLORPERAZINE EDISYLATE 5 MG: 5 INJECTION INTRAMUSCULAR; INTRAVENOUS at 05:19

## 2021-07-18 RX ADMIN — ONDANSETRON 4 MG: 2 INJECTION INTRAMUSCULAR; INTRAVENOUS at 09:36

## 2021-07-18 RX ADMIN — ONDANSETRON 4 MG: 2 INJECTION INTRAMUSCULAR; INTRAVENOUS at 01:34

## 2021-07-18 RX ADMIN — HYDROMORPHONE HYDROCHLORIDE 2 MG: 1 SOLUTION ORAL at 19:05

## 2021-07-18 RX ADMIN — HYDROXYZINE HYDROCHLORIDE 50 MG: 25 TABLET, FILM COATED ORAL at 13:04

## 2021-07-18 RX ADMIN — HYDROMORPHONE HYDROCHLORIDE 2 MG: 1 SOLUTION ORAL at 09:44

## 2021-07-18 RX ADMIN — SODIUM CHLORIDE 300 ML: 9 INJECTION, SOLUTION INTRAVENOUS at 12:30

## 2021-07-18 RX ADMIN — HYDROMORPHONE HYDROCHLORIDE 2 MG: 1 SOLUTION ORAL at 05:03

## 2021-07-18 RX ADMIN — HYDROMORPHONE HYDROCHLORIDE 2 MG: 1 SOLUTION ORAL at 01:33

## 2021-07-18 RX ADMIN — SODIUM CHLORIDE 250 ML: 9 INJECTION, SOLUTION INTRAVENOUS at 12:30

## 2021-07-18 RX ADMIN — MIDODRINE HYDROCHLORIDE 10 MG: 5 TABLET ORAL at 11:47

## 2021-07-18 RX ADMIN — Medication: at 12:30

## 2021-07-18 RX ADMIN — HYDROMORPHONE HYDROCHLORIDE 2 MG: 1 SOLUTION ORAL at 12:44

## 2021-07-18 RX ADMIN — Medication 550 MG: at 19:54

## 2021-07-18 RX ADMIN — MEROPENEM 500 MG: 500 INJECTION, POWDER, FOR SOLUTION INTRAVENOUS at 19:53

## 2021-07-18 RX ADMIN — HYDROMORPHONE HYDROCHLORIDE 2 MG: 1 SOLUTION ORAL at 22:10

## 2021-07-18 RX ADMIN — IOPAMIDOL 119 ML: 755 INJECTION, SOLUTION INTRAVENOUS at 11:29

## 2021-07-18 RX ADMIN — HYDROMORPHONE HYDROCHLORIDE 2 MG: 1 SOLUTION ORAL at 15:38

## 2021-07-18 ASSESSMENT — ACTIVITIES OF DAILY LIVING (ADL)
ADLS_ACUITY_SCORE: 17
ADLS_ACUITY_SCORE: 17
ADLS_ACUITY_SCORE: 19
ADLS_ACUITY_SCORE: 17
ADLS_ACUITY_SCORE: 20
ADLS_ACUITY_SCORE: 17

## 2021-07-18 ASSESSMENT — MIFFLIN-ST. JEOR
SCORE: 1672.75
SCORE: 1682.75

## 2021-07-18 NOTE — PROGRESS NOTES
Nephrology Progress Note  07/18/2021           Dax Villareal is a 30 year old male with h/o ETOH hepatitis, end stage liver disease, RADHA on HD, transferred from [facility] in [place] for septic shock on 6/28/21 for treatment of necrotizing pancreatitis and decompensated liver disease. He was initially admitted to [facility] 5/19/21.        Interval History :   Mr Villareal was planned for HD yesterday but refused due to nausea, K now is up to 5.6 and Na is low.  He is willing to do HD today to clear K so will do short 2h run to prevent any emergency issues.  Still tachycardic but BP's stable.        Assessment & Recommendations:   RADHA-Cr was 0.8 as recently as 5/19/2021 but now HD dependent, started RRT at [facility] prior to transfer to [facility] (exact date unclear but per family it was end of May).  Etiology likely multifactorial with contrast, sepsis/pancreatitis, likely HRS physiology contributing as well.  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  Continuing RRT as long as it is tolerated with regards to BP's, is functional enough to possibly do outpt HD.  MELD is in high 30's so has high 3 month mortality but continuing restorative cares for now.                -Line is TDC from [facility]               -Short HD today to treat hyperkalemia, refused run yesterday due to nausea.       Volume status-Anuric, no major edema on exam, wt is down nearly 10kg since admission.  Doing short HD today with minimal UF with tachycardia although fevers have resolved.      Electrolytes/pH-K 5.6, bicarb 20, running short HD today.      Ca/phos/pth-Ca 7.9, corrects to normal with low albumin.       Anemia-Hgb 7.5, acute management per team, last PRBC's 7/10.       Nutrition-Taking PO, appetite ok.        Recommendations were communicated to primary team via verbal communication.     SOFIA Marlow CNS  Clinical Nurse Specialist  [phone]      Review of Systems:   I reviewed the following systems:  Gen: No fevers or  "chills  CV: No CP at rest  Resp: No SOB at rest  GI: No N/V    Physical Exam:   I/O last 3 completed shifts:  In: 2490 [P.O.:820; Other:20; NG/GT:90]  Out: 140 [Drains:140]   /59 (BP Location: Right arm)   Pulse (!) 124   Temp 97.9  F (36.6  C)   Resp 15   Ht 1.676 m (5' 6\")   Wt 78.5 kg (173 lb 1 oz)   SpO2 95%   BMI 27.93 kg/m       GENERAL APPEARANCE: Mild distress, + abd pain.    EYES:  scleral icterus, pupils equal  Pulmonary: lungs clear to auscultation. Off supplemental oxygen   CV: tachy   - Edema - no LE edema  GI: large distended, tender. Has retroperitoneal drain  MS: no evidence of inflammation in joints, no muscle tenderness  SKIN: no rash, warm, dry, no cyanosis  NEURO: alert/interactive  ACCESS: Right TDC        Labs:   All labs reviewed by me  Electrolytes/Renal - Recent Labs   Lab Test 07/18/21  0637 07/17/21  2031 07/17/21  0506 07/16/21  0616 07/15/21  0443 07/14/21  0345 07/14/21  0345 07/13/21  0718   * 127* 130* 130*  130* 131*  --  130* 131*   POTASSIUM 5.6* 5.1 5.0 4.0 3.3*  --  4.2 3.6   CHLORIDE 97 97 99 99 98  --  98 98   CO2 20 20 20 20 23  --  20 23   BUN 68* 57* 51* 39* 26  --  42* 29   CR 4.46* 4.08* 3.77* 3.23* 2.30*  --  3.35* 2.60*   * 142* 136* 151* 131*   < > 137* 94   JANENE 8.4* 8.3* 7.9* 7.8* 8.1*  --  8.6 8.5   MAG  --   --   --   --  1.6  --  1.6 1.5*   PHOS  --   --   --  2.0* 3.0  --  4.6* 4.3    < > = values in this interval not displayed.       CBC -   Recent Labs   Lab Test 07/18/21  0637 07/17/21  0506 07/16/21  0616   WBC 25.2* 27.3* 26.7*   HGB 7.5* 7.3* 7.5*    225 231       LFTs -   Recent Labs   Lab Test 07/18/21  0637 07/17/21  0506 07/15/21  0443   ALKPHOS 242* 175* 182*   BILITOTAL 7.4* 7.5* 9.3*   ALT 29 20 16   * 87* 86*   PROTTOTAL 7.3 7.0 7.6   ALBUMIN 2.2* 2.3* 2.7*       Iron Panel - No lab results found.        Current Medications:    sodium chloride 0.9%  250 mL Intravenous Once in dialysis/CRRT     sodium chloride " 0.9%  300 mL Hemodialysis Machine Once     B and C vitamin Complex with folic acid  5 mL Per Feeding Tube Daily     folic acid  1 mg Oral Daily    Or     folic acid  1 mg Intravenous Daily     lactulose  20 g Oral or NG Tube 4x Daily     melatonin  6 mg Oral At Bedtime     meropenem  500 mg Intravenous Q24H     - MEDICATION INSTRUCTIONS -   Does not apply Once     pantoprazole  40 mg Oral QAM AC     potassium chloride  20 mEq Oral BID     rifaximin  550 mg Oral BID     sodium chloride (PF)  10 mL Irrigation Q8H     sodium chloride (PF)  9 mL Intracatheter During Dialysis/CRRT (from stock)     sodium chloride (PF)  9 mL Intracatheter During Dialysis/CRRT (from stock)     vitamin B1  100 mg Oral Daily    Or     thiamine  100 mg Intravenous Daily       - MEDICATION INSTRUCTIONS -       dextrose

## 2021-07-18 NOTE — PROGRESS NOTES
[facility]    Progress Note - [facility] 3 Service        Date of Admission:  6/28/2021    Assessment & Plan   Dax Villareal is a 30 year old male with hx of alcohol use disorder admitted on 6/28/2021 after recent prolonged admission at [facility] for acute alcoholic hepatitis who has ESLD c/b HE, ARF requiring HD, and SBP and malnutrition in the setting of acute necrotizing pancreatitis, s/p PEG-J placement on 7/13. He arrived with acute respiratory failure and septic shock that have resolved, and his SBP has been appropriately treated. He requires continued management for ARF, hepatic encephalopathy, infected pancreatic pseudocyst, and nutritional support via PEG-J.       Changes today:   - CT A/P for abdominal pain- no acute abscess or change from previous CT to suggest other infection   - Will finish dose of meropenem today as per ID   - Dialysis today     #Necrotizing pancreatitis w/ infected pseudocyst s/p RP Drain, improving  #Septic Shock, resolved   #SBP, resolved  #Leukocytosis, acute rise iso down trending WBCs  #Peripancreatic fluid collection growing VRE   Etiology of the septic shock that he came in with likely due to necrotizing pancreatitis, infected pseudocyst, and SBP. Had percutaneous RP drain placed for drainage of pancreatic collection. Diagnostic para returned with SBP. Repeat CT 7/11 showed significant decrease in size of walled off necrosis and unchanged moderate ascites. S/p therapeutic paracentesis 7/13 (1L removed).     7/15-7/16 overnight, pt febrile to 100.8 and has increased leukocytosis and tachycardia. Underwent infectious work up (CXR, diagnostic para, sputum/blood cultures) all of which were unrevealing. Per discussion with ID, ongoing leukocytosis is not unexpected with nec panc. CT A/P 7/18 similar compared to prior, no new abscess or evidence of     - Finish 21 day course of meropenem today   - Daily CBC  - MAP goal > 65    - GI following, appreciate recs    - For back pain: Tylenol PRN and Dilaudid 1-2mg q3h PRN  - Will need cipro ppx for SBP now that stopping meropenem      # Severe malnutrition in setting of chronic illness  PEG-J placed 7/13. Procedure went well, patient reached goal of 65 mL/hr but had large emesis 7/16 AM, TF turned off. Restarted TF at 40 mL/hr, will increase today as pt tolerates. FWF 30 mL q4h. Will keep G tube open/vented.   - Dietitian consult for new tube feeds, appreciate recs              - Continue TFs, will work on consolidating in coming days   - Regular Diet, thin liquids per SLP  - Continue multivitamins  - Pantoprazole 40 mg IV daily  - Zofran PRN     # Decompensated EtOH cirrhosis c/b hepatic encephalopathy and recurrent ascites  # Hx of Alcohol use disorder c/b withdrawal   # Anemia  # Coagulopathy   Patient with a history of marked alcohol use and alcoholic hepatitis in the past. Was given a course of steroids (1 week) during previous admission at OSH. Patient mental status has been stable, alert and oriented x4 for a week, though on 7/16 patient is very fatigued. Still seems clear from an HE standpoint, well managed on lactulose regimen. S/p pRBC infusion x2 over the past few days d/t a drop to 6.7 on 7/13 (etilogy likely chronic liver disease, chronic kidney disease, phlebotomy, dilution, poor nutrition, ect), Hgb now stable at 7.5, anticipate another pRBC infusion this weekend.   - Lactulose 20g QID and PRN and rifaximin 550mg BID (titrate to 1.5L stool/day)  - Trend MELD labs  - Daily folic acid and thiamine  - Delirium precautions  - Melatonin 6mg at bedtime  - Daily CBC   - Transfuse if hemoglobin < 7, platelet < 10 or < 30 with bleeding  - Continue to monitor for signs of bleeding     MELD-Na score: 35 at 7/18/2021  6:37 AM  MELD score: 33 at 7/18/2021  6:37 AM  Calculated from:  Serum Creatinine: 4.46 mg/dL (Using max of 4 mg/dL) at 7/18/2021  6:37 AM  Serum Sodium: 128 mmol/L at  7/18/2021  6:37 AM  Total Bilirubin: 7.4 mg/dL at 7/18/2021  6:37 AM  INR(ratio): 1.74 at 7/18/2021  6:37 AM  Age: 30 years     # ARF on HD (MWF)  # Hypervolemia causing acute respiratory failure, resolved  Patient febrile to 100.8 overnight on 7/16, dialysis pushed from today to Saturday 7/17. K increased to 5 today.      > Hemodialysis today (Typically MWF, but pushed yesterday due to tachycardia, and refused 7/17)               - Midodrine 10 mg PRN before dialysis sessions     > Daily chemistry labs, strict I/O, and daily weights  - Aspiration precautions  - Supplemental oxygen as needed to keep sats above 89%     # Goals of Care  Please see additional interval note for in depth details regarding conversation on 7/9 about goals of care. Patient's long term prognosis is still quite poor given his multi organ failure. Care conference on 7/15, pt did not wish to participate. Met with sisters Noni (on phone) and Melyssa and close friend Leroy with palliative, nephrology and general medicine teams today to discuss Dax's current improving status, poor prognosis and wishes to continue full cares. Plan to meet Fridays at 4 pm with teams and family for weekly care conferences.     Diet: Adult Formula Drip Feeding: Continuous Vital 1.5; Jejunostomy; Goal Rate: 65; mL/hr; Medication - Feeding Tube Flush Frequency: At least 15-30 mL water before and after medication administration and with tube clogging; Amount to Send (Nutrition us...  2 Gram Sodium Diet  Snacks/Supplements Adult: Ensure Clear; With Meals    DVT Prophylaxis: Pneumatic Compression Devices  Rdz Catheter: Not present  Fluids: 5 mL NS TKO  Central Lines: PRESENT  PICC Triple Lumen 06/28/21 Left-Site Assessment: WDL  CVC Double Lumen Right-Site Assessment: WDL  Code Status: Full Code      Disposition Plan   Expected discharge: >7 days, recommended to transitional care unit once mental status at baseline, renal function and nutrition improved and necrotizing  pancreatitis is well managed.     The patient's care was discussed with the Attending Physician, Dr. Baker.    Clarissa Cristofer   IM-Peds PGY-1   [address]  Securely message with the Vocera Web Console (learn more here)  Text page via [facility] Paging/Directory  Please see sign in/sign out for up to date coverage information  ____________________________________________________________________    Interval History   Overnight, no acute events. Refused dialysis last night.     Ongoing abdominal pain over the past two days. Has been requesting medical staff do not examine him. Ongoing frequent stools despite refusing lactulose.     Data reviewed today: I reviewed all medications, new labs and imaging results over the last 24 hours.     Physical Exam   Vital Signs: Temp: 98.4  F (36.9  C) Temp src: Oral BP: 112/59 Pulse: (!) 124   Resp: 16 SpO2: 95 % O2 Device: None (Room air)    Weight: 173 lbs .98 oz     Gen: Lying down in bed,  HEENT: Conjunctival icterus  CV: Tachycardic. Regular rhythm.   Resp: Breathing comfortably on RA  Abd: Refused palpation today, PEG J in place without surrounding erythema   Skin: Jaundiced.  Ext: Well perfused, no edema  Neuro: Alert, responding appropriately to questions     Data   Recent Labs   Lab 07/18/21  0637 07/17/21  2031 07/17/21  0506 07/16/21  0616   WBC 25.2*  --  27.3* 26.7*   HGB 7.5*  --  7.3* 7.5*   MCV 95  --  97 97     --  225 231   INR 1.74*  --  1.80* 1.77*   * 127* 130* 130*  130*   POTASSIUM 5.6* 5.1 5.0 4.0   CHLORIDE 97 97 99 99   CO2 20 20 20 20   BUN 68* 57* 51* 39*   CR 4.46* 4.08* 3.77* 3.23*   ANIONGAP 11 10 11 11   JANENE 8.4* 8.3* 7.9* 7.8*   * 142* 136* 151*   ALBUMIN 2.2*  --  2.3*  --    PROTTOTAL 7.3  --  7.0  --    BILITOTAL 7.4*  --  7.5*  --    ALKPHOS 242*  --  175*  --    ALT 29  --  20  --    *  --  87*  --      No results found for this or any previous visit (from the  past 24 hour(s)).

## 2021-07-18 NOTE — PROGRESS NOTES
"This writer attempted to hang a new bottle of tube feeding and pt stated \"Can I not get that?\" or \"Can it be reduced to like 4 hours a day\"?  Patient then stated \"I do not want to be pooping all night\".    TF currently off r/t to patient refusing for it to be restarted until further notice.    Paged Yvrose Ayala MD to notify of patients request.      "

## 2021-07-18 NOTE — PLAN OF CARE
Neuro: A&Ox4. Intermittently drowsiness. Refused all doses of lactulose and morning meds. Primary team aware. Refused cares to brush teeth, be turned in bed, and take bed bath.  Cardiac: SR-ST. VSS. Systolic blood pressures. 110-120s.   Respiratory: Sating high 90s on RA. Requested oxygen when going down to dialysis due to needing to wear a mask in the halls. Afebrile.  GI/: Aneuric. Loose BMs x2.  Diet/appetite: Tolerated TF running at 65 mls/hour with 30cc flushes Q4 hours. Turned off at 1630 as requested by patient. Eats small meals/snacks throughout day. Only med taken PO is dilaudid Q3 hours.  Activity: Total lift assist to be out of bed. Refused. Can roll side to side and adjust in bed as needed.  Pain: Reports pain in abdomen and back. Dilaudid given PO q3 hours.  Skin: Coccyx red but blanchable. Barrier cream applied. Skin dry.   LDA's: G and J tube, LA triple lumen PICC, abdominal drain dressing change refused, with 100ccs of brown/bile drainage.    HD run, 1 liter of fluid removed. CT of abdomen.    Plan: Continue with POC. Notify primary team with changes.

## 2021-07-18 NOTE — PROGRESS NOTES
HEMODIALYSIS TREATMENT NOTE    Date: 7/18/2021  Time: 1:22 PM    Data:  Pre Wt: 78.5 kg   Desired Wt: 76.5 kg   Post Wt: 77.5 kg  Weight change: 1 kg  Ultrafiltration - Post Run Net Total Removed (mL): 1000 mL  Vascular Access Status: CVC (right tunneled) / patent   Dialyzer Rinse: streaked; yellow  Total Blood Volume Processed: 31.5 L  Total Dialysis (Treatment) Time:  2 hours  Dialysate Bath: K 2, Ca 3  Heparin: None    Lab:   No    Interventions:  CVC lines reversed 2/2 sluggish return from arterial lumen  2 mg PRN Dilaudid given per primary RN per patient request for pain 7/10 in abdomen and back  50 mg PRN Atarax given per patient request for anxiety  BFR and UF goal reduced from 2 to 1 L net 2/2 large volume change per Critline, increasing HR, SOB and anxiety    Assessment:  HD for ESLD patient with RADHA. On contact and enteric isolation. Alert & oriented. Able to make needs known. C/o pain on arrival to unit. Primary RN brought and administered PRN Dilaudid. Patient repeatedly c/o SOB, anxiety and requesting O2 be increased despite satting 100%. Had medium loose bm at start of run but fell asleep shortly after pain and anxiety meds given. Tachycardic but all other VSS throughout. Run completed without complications.     Plan:    Per renal team.

## 2021-07-18 NOTE — PLAN OF CARE
Neuro: A&Ox4.  Sleepy t/o shift.  Follows commands. Pupils round and reactive.  Patient very withdrawn and flat. Patient refused cares multiple times today, requesting to be left alone to rest.    Cardiac: ST 120s. -120s. MAPs greater than 60s.  Respiratory: Sating above 90% on RA.   GI/: No urine output r/t to HD. BM X4.  C.diff negative.  Diet/appetite: Pt refused to eat any meals today.  Intermittent nausea.  Medications given with slight relief noted.  Emesis 1x.    Activity: Pt is able to help turn in  bed & shift weight. However, refuses to reposition to side with pillows or elevated heels on pillows.  Educated the importance of turning/repositioning.  Pt refused to get up out of bed.  Pt stated several times he just wants to be left alone to sleep.  Pain: increased abd pain.  Primary team aware.  PO dilaudid increased from Q 4 hours to Q 3 hours.  Skin: Coccyx/groin redden.  Barrier creams applied. Heels pink/cracked- refused to elevated on pillow.   LDA's: left PICC 3x lumen. G tube to gravity.  J-tube with TF to goal at 65 ml/hr. Free water flush Q 4 hrs 30 mls.  Abd drain with brown output.  Q 8 hours NS flushes.  Patient refused HD today.  Primary team aware.- Recheck BMP and follow up with Potassium level.    Plan: Continue with POC. Notify primary team with changes.

## 2021-07-18 NOTE — PROVIDER NOTIFICATION
Spoke with MD concerning pt continuous coughing, spitting up thick medium size phlegm after ingesting liquid oral med. O2 saturations 83-88% with coughing on room air, 95% when not coughing. Per MD continue to monitor patient for signs of continual desaturation on RA and other signs of possible aspiration. RN will continue to monitor

## 2021-07-18 NOTE — PLAN OF CARE
Neuro: AxOx4, follows commands, moves ext. Pt slept well overnight. PERRLA 3 Brisk  Resp: 92-97% RA. Desaturation into the 80's when coughing up phlegm (see Provider Notification Note)  Cardiac: 's-120's. -120's. Afebrile  GI/: Anuria related to HD. 1 BM overnight. Tube feed at goal 65ml/hr water flush 30ml/4hr. Patient complains of intermittent nausea, relieved with zofran and compazine.   Pain: Continued abdominal pain 8/10, receiving dilaudid 2mg Q3.   Skin: Coccyx/groin reddened, barrier cream applied. No new deficits noted  LDA's: Left arm PICC triple  Lumen. Gtube to gravity. Jtube. Abd drain with 30cc brown output, Q8 NS flushes.     Plan: Continue with POC, notify primary team with changes.

## 2021-07-19 ENCOUNTER — APPOINTMENT (OUTPATIENT)
Dept: GENERAL RADIOLOGY | Facility: CLINIC | Age: 31
End: 2021-07-19
Attending: INTERNAL MEDICINE
Payer: MEDICAID

## 2021-07-19 LAB
ALBUMIN SERPL-MCNC: 2.1 G/DL (ref 3.4–5)
ALP SERPL-CCNC: 190 U/L (ref 40–150)
ALT SERPL W P-5'-P-CCNC: 19 U/L (ref 0–70)
ANION GAP SERPL CALCULATED.3IONS-SCNC: 10 MMOL/L (ref 3–14)
AST SERPL W P-5'-P-CCNC: 63 U/L (ref 0–45)
BACTERIA SPT CULT: ABNORMAL
BACTERIA SPT CULT: ABNORMAL
BILIRUB SERPL-MCNC: 6.3 MG/DL (ref 0.2–1.3)
BUN SERPL-MCNC: 50 MG/DL (ref 7–30)
CALCIUM SERPL-MCNC: 8.6 MG/DL (ref 8.5–10.1)
CHLORIDE BLD-SCNC: 96 MMOL/L (ref 94–109)
CO2 SERPL-SCNC: 21 MMOL/L (ref 20–32)
CREAT SERPL-MCNC: 3.47 MG/DL (ref 0.66–1.25)
CRYPTOC AG SPEC QL: NEGATIVE
ERYTHROCYTE [DISTWIDTH] IN BLOOD BY AUTOMATED COUNT: 19.7 % (ref 10–15)
GFR SERPL CREATININE-BSD FRML MDRD: 22 ML/MIN/1.73M2
GLUCOSE BLD-MCNC: 83 MG/DL (ref 70–99)
GRAM STAIN RESULT: ABNORMAL
HCT VFR BLD AUTO: 21.7 % (ref 40–53)
HGB BLD-MCNC: 7 G/DL (ref 13.3–17.7)
INR PPP: 1.8 (ref 0.85–1.15)
IRON SATN MFR SERPL: 43 % (ref 15–46)
IRON SERPL-MCNC: 31 UG/DL (ref 35–180)
LACTATE SERPL-SCNC: 1.6 MMOL/L (ref 0.7–2)
MCH RBC QN AUTO: 31 PG (ref 26.5–33)
MCHC RBC AUTO-ENTMCNC: 32.3 G/DL (ref 31.5–36.5)
MCV RBC AUTO: 96 FL (ref 78–100)
PLATELET # BLD AUTO: 251 10E3/UL (ref 150–450)
POTASSIUM BLD-SCNC: 5.1 MMOL/L (ref 3.4–5.3)
PROT SERPL-MCNC: 6.7 G/DL (ref 6.8–8.8)
RBC # BLD AUTO: 2.26 10E6/UL (ref 4.4–5.9)
SARS-COV-2 RNA RESP QL NAA+PROBE: NEGATIVE
SODIUM SERPL-SCNC: 127 MMOL/L (ref 133–144)
TIBC SERPL-MCNC: 72 UG/DL (ref 240–430)
WBC # BLD AUTO: 21 10E3/UL (ref 4–11)

## 2021-07-19 PROCEDURE — 99233 SBSQ HOSP IP/OBS HIGH 50: CPT | Mod: GC

## 2021-07-19 PROCEDURE — 250N000013 HC RX MED GY IP 250 OP 250 PS 637: Performed by: INTERNAL MEDICINE

## 2021-07-19 PROCEDURE — 83550 IRON BINDING TEST: CPT | Performed by: CLINICAL NURSE SPECIALIST

## 2021-07-19 PROCEDURE — 99232 SBSQ HOSP IP/OBS MODERATE 35: CPT | Performed by: PHYSICIAN ASSISTANT

## 2021-07-19 PROCEDURE — 71045 X-RAY EXAM CHEST 1 VIEW: CPT

## 2021-07-19 PROCEDURE — 250N000011 HC RX IP 250 OP 636: Performed by: INTERNAL MEDICINE

## 2021-07-19 PROCEDURE — 250N000009 HC RX 250: Performed by: INTERNAL MEDICINE

## 2021-07-19 PROCEDURE — U0005 INFEC AGEN DETEC AMPLI PROBE: HCPCS | Performed by: INTERNAL MEDICINE

## 2021-07-19 PROCEDURE — 83605 ASSAY OF LACTIC ACID: CPT | Performed by: INTERNAL MEDICINE

## 2021-07-19 PROCEDURE — 999N000044 HC STATISTIC CVC DRESSING CHANGE

## 2021-07-19 PROCEDURE — 82656 EL-1 FECAL QUAL/SEMIQ: CPT | Performed by: INTERNAL MEDICINE

## 2021-07-19 PROCEDURE — 99233 SBSQ HOSP IP/OBS HIGH 50: CPT | Performed by: PHYSICIAN ASSISTANT

## 2021-07-19 PROCEDURE — 87205 SMEAR GRAM STAIN: CPT | Performed by: INTERNAL MEDICINE

## 2021-07-19 PROCEDURE — 71045 X-RAY EXAM CHEST 1 VIEW: CPT | Mod: 26 | Performed by: RADIOLOGY

## 2021-07-19 PROCEDURE — 80053 COMPREHEN METABOLIC PANEL: CPT | Performed by: INTERNAL MEDICINE

## 2021-07-19 PROCEDURE — 85610 PROTHROMBIN TIME: CPT | Performed by: INTERNAL MEDICINE

## 2021-07-19 PROCEDURE — 85027 COMPLETE CBC AUTOMATED: CPT | Performed by: INTERNAL MEDICINE

## 2021-07-19 PROCEDURE — 36592 COLLECT BLOOD FROM PICC: CPT | Performed by: INTERNAL MEDICINE

## 2021-07-19 PROCEDURE — 120N000005 HC R&B MS OVERFLOW UMMC

## 2021-07-19 PROCEDURE — 99232 SBSQ HOSP IP/OBS MODERATE 35: CPT | Performed by: INTERNAL MEDICINE

## 2021-07-19 PROCEDURE — 99233 SBSQ HOSP IP/OBS HIGH 50: CPT | Performed by: STUDENT IN AN ORGANIZED HEALTH CARE EDUCATION/TRAINING PROGRAM

## 2021-07-19 RX ORDER — CIPROFLOXACIN 250 MG/1
250 TABLET, FILM COATED ORAL DAILY
Status: DISCONTINUED | OUTPATIENT
Start: 2021-07-19 | End: 2021-07-22

## 2021-07-19 RX ORDER — BENZONATATE 100 MG/1
100 CAPSULE ORAL 3 TIMES DAILY PRN
Status: DISCONTINUED | OUTPATIENT
Start: 2021-07-19 | End: 2021-10-12 | Stop reason: HOSPADM

## 2021-07-19 RX ORDER — HYDROMORPHONE HYDROCHLORIDE 1 MG/ML
2 SOLUTION ORAL ONCE
Status: DISCONTINUED | OUTPATIENT
Start: 2021-07-20 | End: 2021-07-28 | Stop reason: CLARIF

## 2021-07-19 RX ORDER — SODIUM BICARBONATE 325 MG/1
325 TABLET ORAL
Status: DISCONTINUED | OUTPATIENT
Start: 2021-07-19 | End: 2021-07-29

## 2021-07-19 RX ADMIN — FOLIC ACID 1 MG: 1 TABLET ORAL at 08:04

## 2021-07-19 RX ADMIN — HYDROMORPHONE HYDROCHLORIDE 2 MG: 1 SOLUTION ORAL at 04:53

## 2021-07-19 RX ADMIN — LACTULOSE 20 G: 10 SOLUTION ORAL; RECTAL at 21:25

## 2021-07-19 RX ADMIN — HYDROMORPHONE HYDROCHLORIDE 2 MG: 1 SOLUTION ORAL at 11:02

## 2021-07-19 RX ADMIN — Medication 550 MG: at 21:25

## 2021-07-19 RX ADMIN — CIPROFLOXACIN 250 MG: 250 TABLET ORAL at 11:26

## 2021-07-19 RX ADMIN — HYDROMORPHONE HYDROCHLORIDE 2 MG: 1 SOLUTION ORAL at 14:26

## 2021-07-19 RX ADMIN — HYDROXYZINE HYDROCHLORIDE 50 MG: 25 TABLET, FILM COATED ORAL at 17:24

## 2021-07-19 RX ADMIN — HYDROMORPHONE HYDROCHLORIDE 2 MG: 1 SOLUTION ORAL at 23:21

## 2021-07-19 RX ADMIN — LACTULOSE 20 G: 10 SOLUTION ORAL; RECTAL at 08:04

## 2021-07-19 RX ADMIN — HYDROMORPHONE HYDROCHLORIDE 2 MG: 1 SOLUTION ORAL at 08:14

## 2021-07-19 RX ADMIN — Medication 40 MG: at 08:03

## 2021-07-19 RX ADMIN — ONDANSETRON 4 MG: 2 INJECTION INTRAMUSCULAR; INTRAVENOUS at 23:12

## 2021-07-19 RX ADMIN — HYDROMORPHONE HYDROCHLORIDE 2 MG: 1 SOLUTION ORAL at 17:24

## 2021-07-19 RX ADMIN — ONDANSETRON 4 MG: 2 INJECTION INTRAMUSCULAR; INTRAVENOUS at 16:56

## 2021-07-19 RX ADMIN — HYDROXYZINE HYDROCHLORIDE 50 MG: 25 TABLET, FILM COATED ORAL at 23:12

## 2021-07-19 RX ADMIN — HYDROMORPHONE HYDROCHLORIDE 2 MG: 1 SOLUTION ORAL at 00:54

## 2021-07-19 RX ADMIN — THIAMINE HCL TAB 100 MG 100 MG: 100 TAB at 11:21

## 2021-07-19 RX ADMIN — HYDROXYZINE HYDROCHLORIDE 50 MG: 25 TABLET, FILM COATED ORAL at 11:20

## 2021-07-19 RX ADMIN — HYDROMORPHONE HYDROCHLORIDE 1 MG: 1 SOLUTION ORAL at 20:45

## 2021-07-19 RX ADMIN — HYDROXYZINE HYDROCHLORIDE 50 MG: 25 TABLET, FILM COATED ORAL at 00:00

## 2021-07-19 ASSESSMENT — ACTIVITIES OF DAILY LIVING (ADL)
ADLS_ACUITY_SCORE: 20

## 2021-07-19 NOTE — PROGRESS NOTES
[facility] PROGRESS NOTE     Patient:  Dax Villareal   Date of birth 1990, Medical record number [id number]  Date of Visit:  07/19/2021  Date of Admission: 6/28/2021  Consult Requester:Patricia Baker, *          Assessment and Plan:   RECOMMENDATION:   1. Monitor off of meropenem.  2. SBP prophylaxis with Cipro  3. Collect cell count w/diff, and culture if paracentesis is preformed.       ASSESSMENT:  Dax Villareal is a 30 year old male with PMHx significant for alcohol use disorder who was admitted to [facility] in [place], [place] for abdominal pain, nausea and vomiting, found to have end stage liver disease c/b hepatic encephalopathy, acute renal failure requiring iHD, SBP and acute necrotizing pancreatitis.      Afebrile on admission, however has spiked some low grade intermittent fevers up to 100.9 on 7/1-2 then defervesced. WBC significantly elevated on admission to 50K with slow downtrend over the past few weeks, currently 17K. Lactate intermittently elevated into the 2-3 range, otherwise wnl. Infectious work up since admission to [facility] has remained negative. BCx negative. Drain placement pancreatic cultures collected 6/30 NGTD/negative. 7/2 Ascites fluid cultures negative, labs included WBC 2663/74% neutrophils. Repeat ascites fluid cultures 7/5 with 1801 WBC/61% neutrophils, cultures negative. 7/13 ascites fluid cultures NGTD. See imaging section below for review.      Started on meropenem (6/29-7/18), micafungin (6/29-7/1), and given 1x dose of IV Vancomycin (6/29). On Rifaximin. He has now completed 4-5 days following replacement of perc drain for a total of 21 days of broad spectrum antibiotics s/p initial perc drain placement for necrotizing pancreatitis with pseudocyst. Cultures remain largely negative with exception of VRE (see below). As such transitioned to Cipro for SBP prophylaxis and monitoring off of broad abx.     Of note, Abscess culture collected 7/14  "from his re-positioned drain with growth of VRE. When the culture was collected he was clinically stable and there was no evidence of a new infection with improvement in WBC count noted on serial ascites studies. As such, elected not to treat VRE as it was felt to most likely be colonization having been selected with prolonged meropenem coverage.      Thank you for this consult. ID will continue to follow.      Rosa Elena Horan PA-C  Pronouns: she/her/hers  Infectious Diseases  Pager: [phone]  07/19/2021           Interim History and Events:   Afebrile. 6L supp O2 weaned to RA. Meropenem transitioned to ppx Cipro. Dax reports abdominal pain \"all over\" declined exam as multiple people have been feeling his stomach which makes it worse. He can't say for sure whether pain is stable or worse today. Reports he \"sometimes have trouble with breathing, though on RA now. No edema.          HPI:   Dax Villareal is a 30 year old male with PMHx significant for alcohol use disorder who was admitted to [facility] in [place], [place] for abdominal pain, nausea and vomiting, found to have end stage liver disease c/b hepatic encephalopathy, acute renal failure requiring iHD, SBP and acute necrotizing pancreatitis.      He required respiratory support (BIPAP, ? Intubation) for respiratory failure and appeared to be in sepsis (lactate reported at 7.7 with abnormal LFTs). He was transferred to [facility] for prolonged admission and reportedly treated with steroids; kidney function and pancreatic enzymes normal on admission). Length of stay stated >40 days. CT imaging 6/19 with concern for necrotizing pancreatitis with follow up CT scan 6/25 with peripancreatic fluid collection and 9 cm soft tissue/hemorrhagic structure c/f internal hemorrhage of a pseudocyst. Given multiple antibiotics (Cefepime, Flagyl, Ciprofloxacin, micafungin). Hospital course reportedly complicated by hypoxemic respiratory failure requiring BIPAP, ARF " "requiring iHD, hepatic encephalopathy, and septic shock 2/2 necrotizing pancreatitis/pancreatic pseudocyst requiring pressor support. Ultimately transferred to [facility] for interventions 6/28. Admitted to the ICU.      Now status post perc IR drain placement 6/29 with cultures that remain NGTD/negative. No endoscopic drainage by GI was pursued given improvement in imaging 7/11. Additionally has had 3 paracenteses (7/2, 7/5, and 7/13 with negative culture work up. Had PEGJ placed 7/13.      He is an outdoorsman. Picks mushrooms, goes fishing/hunting, and hiking. Has not been since \"last spring\". Denies feeling ill prior to admission to OSH. Feeling fairly well currently although notes diffuse abdominal pain. Has had multiple procedures within the past few days and states he feels like he needs a \"rest day\". Denies fever/chills. No URI sx. Is having diarrhea, but on lactulose. Anuric.          ROS:   -Focused 5 point ROS completed, pertinent positives and negatives listed above.    Physical Examination:  Temp: 97.5  F (36.4  C) Temp src: Oral BP: 114/43 Pulse: 118   Resp: 16 SpO2: 97 % O2 Device: None (Room air) Oxygen Delivery: 2.5 LPM    Vitals:    07/15/21 0016 07/16/21 0546 07/17/21 0500 07/18/21 0500   Weight: 73.5 kg (162 lb 0.6 oz) 74 kg (163 lb 2.3 oz) 78.2 kg (172 lb 6.4 oz) 78.5 kg (173 lb 1 oz)    07/18/21 1430   Weight: 77.5 kg (170 lb 13.7 oz)     Declined physical exam due to abdominal pain and need to use bedpan.     Constitutional: Adult male patient seen lying in bed, in NAD. Alert and interactive.   HEENT: NCAT, icterus, conjunctiva clear. Moist mucous membranes.  Respiratory: Non-labored breathing on RA.  Skin: Warm and dry. Jaundiced. No rashes, scattered petechiae on torso.  Musculoskeletal: Extremities grossly normal. No tenderness or edema present.   Neurologic: A &O x3, speech normal, answering questions appropriately. Moves all extremities spontaneously. Grossly non-focal.  Neuropsychiatric: " Mentation and affect normal/appropriate, somewhat flat.  VAD: CVC is c/d/i with no erythema, drainage, or tenderness.      Medications:    sodium chloride 0.9%  250 mL Intravenous Once in dialysis/CRRT     sodium chloride 0.9%  300 mL Hemodialysis Machine Once     B and C vitamin Complex with folic acid  5 mL Per Feeding Tube Daily     ciprofloxacin  250 mg Oral Daily     folic acid  1 mg Oral Daily    Or     folic acid  1 mg Intravenous Daily     lactulose  20 g Oral or NG Tube 4x Daily     melatonin  6 mg Oral At Bedtime     - MEDICATION INSTRUCTIONS -   Does not apply Once     pantoprazole  40 mg Oral QAM AC     [Held by provider] potassium chloride  20 mEq Oral BID     rifaximin  550 mg Oral BID     sodium chloride (PF)  10 mL Irrigation Q8H     sodium chloride (PF)  9 mL Intracatheter During Dialysis/CRRT (from stock)     sodium chloride (PF)  9 mL Intracatheter During Dialysis/CRRT (from stock)     vitamin B1  100 mg Oral Daily    Or     thiamine  100 mg Intravenous Daily       Infusions/Drips:    - MEDICATION INSTRUCTIONS -       dextrose         Laboratory Data:   Inflammatory Markers    Recent Labs   Lab Test 10/06/20  0927   CRP 16.9*       Metabolic Studies       Recent Labs   Lab Test 07/19/21  0632 07/18/21  1141 07/18/21  0637 07/17/21  2031 07/17/21  0507 07/17/21  0506 07/16/21  0616 07/15/21  0811 07/15/21  0443 07/14/21  0345 07/13/21  1050   *  --  128* 127*  --  130* 130*  130*  --  131* 130*  --    POTASSIUM 5.1  --  5.6* 5.1  --  5.0 4.0  --  3.3* 4.2  --    CHLORIDE 96  --  97 97  --  99 99  --  98 98  --    CO2 21  --  20 20  --  20 20  --  23 20  --    ANIONGAP 10  --  11 10  --  11 11  --  10 12  --    BUN 50*  --  68* 57*  --  51* 39*  --  26 42*  --    CR 3.47*  --  4.46* 4.08*  --  3.77* 3.23*  --  2.30* 3.35*  --    GFRESTIMATED 22*  --  16* 18*  --  20* 24*  --  36* 23*  --    GLC 83  --  164* 142*  --  136* 151* 126* 131* 137*  --    JANENE 8.6  --  8.4* 8.3*  --  7.9* 7.8*  --   8.1* 8.6  --    PHOS  --   --   --   --   --   --  2.0*  --  3.0 4.6*  --    MAG  --   --   --   --   --   --   --   --  1.6 1.6  --    LACT  --  1.6  --   --  1.5  --   --   --   --   --    < >    < > = values in this interval not displayed.       Hepatic Studies    Recent Labs   Lab Test 07/19/21  0632 07/18/21  0637 07/17/21  0506 07/15/21  0443 07/14/21  0345 07/13/21  0718   BILITOTAL 6.3* 7.4* 7.5* 9.3* 11.1* 13.9*   ALKPHOS 190* 242* 175* 182* 174* 167*   ALBUMIN 2.1* 2.2* 2.3* 2.7* 2.6* 2.6*   AST 63* 128* 87* 86* 54* 60*   ALT 19 29 20 16 12 15       Pancreatitis testing    Recent Labs   Lab Test 06/29/21  0410 06/28/21  1839 05/18/21  1220 12/09/20  1504 10/06/20  0927   LIPASE  --  199 381 140 139   TRIG Unsatisfactory specimen - icteric Canceled, Test credited  --   --   --        Hematology Studies      Recent Labs   Lab Test 07/19/21  0632 07/18/21  0637 07/17/21  0506 07/16/21  0616 07/15/21  0443 07/14/21  2240 07/14/21 0342 06/29/21  0410 06/28/21 1839 05/18/21  1220 12/09/20  1504 10/06/20  0927   WBC 21.0* 25.2* 27.3* 26.7* 20.3*  --  17.7*   < > 50.6* 13.2* 4.2 6.9   ANEU  --   --   --   --   --   --   --   --  44.0* 12.0* 2.1 5.2   ALYM  --   --   --   --   --   --   --   --  0.0* 0.6* 1.3 0.8   MEDHAT  --   --   --   --   --   --   --   --  2.0* 0.4 0.6 0.8   AEOS  --   --   --   --   --   --   --   --  0.5 0.0 0.2 0.1   HGB 7.0* 7.5* 7.3* 7.5* 8.0* 7.9* 6.9*  6.9*   < > 7.6* 16.6 17.2 18.8*   HCT 21.7* 22.6* 22.3* 23.0* 24.2*  --  20.7*   < > 23.4* 44.8 47.7 51.2    251 225 231 219  --  213   < > 127* 222 299 311    < > = values in this interval not displayed.       Arterial Blood Gas Testing    Recent Labs   Lab Test 07/01/21  0014 06/30/21  1716 06/28/21  1839   O2PER 60.0 5L 2L        Urine Studies     Recent Labs   Lab Test 12/09/20  1817 10/06/20  1214   URINEPH 7.0 7.0   NITRITE Negative Negative   LEUKEST Negative Negative   WBCU 0 1       Vancomycin Levels     Recent Labs   Lab  Test 21  0700   VANCOMYCIN 31.2*       Microbiology:  Culture Micro   Date Value Ref Range Status   2021 No growth  Final   2021 Culture negative after 1 week  Preliminary   2021 No growth  Final   2021 No growth  Final   2021 No growth  Final   2021 No growth  Final   2021 No anaerobes isolated  Final   2021 No growth  Final   2021 No growth  Final   2021 No growth after 6 days  Final   2021 No growth after 6 days  Final       Last check of C difficile  C Difficile Toxin B by PCR   Date Value Ref Range Status   2021 Negative Negative Final     Comment:     A negative result does not exclude actual disease due to C. difficile and may be due to improper collection, handling and storage of the specimen or the number of organisms in the specimen is below the detection limit of the assay.       Imagin/16 CXR  IMPRESSION: Unchanged mixed interstitial and airspace opacities most  prominent in the perihilar and bibasilar regions.  1.  Stable support equipment.     CT AP w/o contrast   IMPRESSION:   1. Sequela of necrotizing pancreatitis with highly decreased size of  walled off necrosis centered in the lesser sac. Percutaneous drain  remains in place.  2. Loculated moderate ascites is unchanged.  3. Hepatomegaly, hepatic steatosis, and evidence of portal  hypertension again seen.  4. Small bilateral pleural effusions are slightly improved.      CT AP w/o contrast   IMPRESSION:   1. Status post placement of a left lateral approach drainage catheter,  substantially decreased but persistent hemorrhagic walled off necrosis  centered in the lesser sac. A smaller collection along the inferior  aspect of the third portion of the duodenum is minimally changed.  2. Minimally changed moderate to large volume loculated ascites.  3. Hepatomegaly and hepatic steatosis with evidence of cirrhosis and  portal hypertension.  4. Diffuse edematous  wall thickening of the small and large bowel,  likely reactive. No bowel obstruction.  5. Small right and trace left pleural effusions with adjacent  compressive atelectasis, similar to prior. Small pericardial effusion.  6. Faint patchy ground glass opacities in the right middle lobe and  and right upper lobe, likely infectious/inflammatory.     6/29 CT AP w/ contrast   IMPRESSION:   1.  Sequela of necrotizing pancreatitis with peripancreatic necrotic  fluid collection, large volume abdominal ascites and mesenteric edema.  2.  Thickened, edematous small bowel wall is likely due to reactive  enteritis secondary to pancreatitis.  3.  Hepatomegaly with steatosis and mild surface nodularity,  associated with splenomegaly and recanalized paraumbilical vein,  suggestive of cirrhosis with portal hypertension.  4.  Enlarged hypoenhancing kidneys with loss of corticomedullary  differentiation, suggestive of acute renal parenchymal disease. No  hydronephrosis or perinephric fat stranding.  5.  Small right and trace left pleural effusions, interlobular septal  thickening, and diffuse anasarca, likely related to fluid third  spacing.

## 2021-07-19 NOTE — PROGRESS NOTES
GASTROENTEROLOGY PROGRESS NOTE    Date of Admission: 6/28/2021  Reason for Admission: pancreatitis/hepatitis      ASSESSMENT/RECOMMENDATIONS:  30 year old male with a history of heavy alcohol use who presented to OSH 5/19 for abdominal pain, nausea and vomiting, admitted for alcohol hepatitis, ESLD, RADHA on HD, septic shock requiring pressors as well as necrotizing pancreatitis with large evolving necrotic collection. Transferred to [facility] for consideration of drainage of presumed infected necrotic collection.     #. Acute necrotizing pancreatitis with presumed infected walled off necrotic collection s/p percutaneous drainage  #. Septic shock, resolved  #. Leukocytosis  #. Ascites/Peritonitis  Unclear evolution of pancreatitis history but had a recent CT scan with large evolving necrotic collection, presumed infected. No obvious gas in collection. CT on admission to [facility] showing mature collection with enhancing wall measuring ~[id number]. With profound coagulopathy (liver disease + malnutrition +sepsis), INR 3.8 on transfer he was given FFP and Vit K, IR placed perc drain 6/29. Repeat imaging 7/18 with still large walled off necrotic collection that does appear amenable to endoscopic transluminal drainage however patient is weary of additional procedures that may increase his abdominal pain. Would recommend ongoing discussions with the patient regarding goals of care.  Etiology: alcohol  Date of onset: 5/19  BISAP score on admission: unclear  Concurrent organ failure: respiratory (now extubated), renal (on HD), liver, cardiogenic (weaned off pressors 6/29)  Thromboses: none  Diabetes: no  Current nutrition access: PEG-J  Last imaging: CT scan with IV contrast 7/18  Infection/antiinfectives: (Enterococcus faecium VRE from L RP flank drain 7/14)                - Meropenem (6/29 - 7/18)     - Ciprofloxacin (7/19 - present)                - Vancomycin (6/29 - 6/30)     - Micafungin (6/29 - 7/1)   Intervention:    6/30  "- Left IR RP perc drain (24F)    7/2 - Diagnostic paracentesis (WBC 2663 - 74% PMN, SAAG <1.1, protein 4.1)     7/5 - Repeat Paracentesis (WBC 1801 - 61% PMN, lipase 48)    7/13 - Diag/therapeutic paracentesis ()    7/13 - PEG-J placement with T-tag gastropexy    -Ideally would plan for EUS guided cystgastrostomy on Wednesday 7/21 but patient presently refusing additional procedures (would like us to return to discuss more tomorrow)  -Drain management per IR  -PRN paracentesis  -Antibiotics per ID  -Pain meds per primary team    #. Severe malnutrition in the context of chronic illness  #. S/p PEG-J placement  Patient with very poor oral intake over the course of his illness and was not meeting calorie requirements orally. S/p PEG-J with T-tag gastropexy (x3) 7/13.  -Advance tube feeds as tolerated  -Please start PERT (defer to dietitian for dosing)  -Oral diet as tolerated  -T-tags (white buttons) should be removed 2 weeks following procedure (~7/27) by lifting the button and cutting the underlying blue suture. Often times the buttons fall off prior to the 2 week louis.     #. Alcohol hepatitis  #. Hyperbilirubinemia  #. Coagulopathy/Thrombocytopenia  #. Encephalopathy  Known heavy alcohol use. Reportedly received course of steroids at OSH for 1 week with \"some improvement\". Currently has primarily hyperbilirubinemia and mildly elevated AST. Unclear if he has underlying cirrhosis but certainly would fit with alcohol hepatitis vs elevated liver tests of critical illness/sepsis. Not candidate for steroids right now. He is receiving lactulose and Xifaxin for encephalopathy but having a lot of stool output. Hepatology notified of admission, ongoing discussions with them. Not currently liver transplant candidate.  -Trend MELD labs  -Continue lactulose/Xifaxin and titrate to 3 soft stools per day -- please record stool output  -Alcohol abstinence    This patient has a very poor overall prognosis and agree with " "palliative care consultation and C discussions with the family    The patient was discussed and plan agreed upon with GI staff, Dr Ginna Norwood, PA-C  Advanced Endoscopy/Pancreaticobiliary GI Service  [facility]  Text Page  _______________________________________________________________      Subjective: Nursing notes and 24hr events reviewed. Patient seen and examined at 1030. Refused abdominal exam due to pain. Also had nursing stop tube feeds overnight due to diarrhea.     ROS:   4 pt ROS negative unless noted in subjective.     Objective:  Blood pressure 112/62, pulse 96, temperature 98.1  F (36.7  C), temperature source Oral, resp. rate 16, height 1.676 m (5' 6\"), weight 77.5 kg (170 lb 13.7 oz), SpO2 97 %.  Gen: Laying in bed. Appears comfortable  HEENT: NCAT. Conjunctiva clear. Sclera ++icteric, poor dentition  CV: Peripheral perfusion intact  Resp: non labored breathing  Abd: minimal distention, minimal dark brown output in RP perc drain, GJ tube with dried blood but patient would not let me touch his abdomen  Msk: no gross deformity  Skin:  ++ jaundice  Ext: warm, well perfused  Neuro: non specific  Mental status/Psych: alert and oriented, flat affect, somnolent    LABS:  BMP  Recent Labs   Lab 07/19/21  0632 07/18/21  0637 07/17/21  2031 07/17/21  0506   * 128* 127* 130*   POTASSIUM 5.1 5.6* 5.1 5.0   CHLORIDE 96 97 97 99   JANENE 8.6 8.4* 8.3* 7.9*   CO2 21 20 20 20   BUN 50* 68* 57* 51*   CR 3.47* 4.46* 4.08* 3.77*   GLC 83 164* 142* 136*     CBC  Recent Labs   Lab 07/19/21  0632 07/18/21  0637 07/17/21  0506 07/16/21  0616   WBC 21.0* 25.2* 27.3* 26.7*   RBC 2.26* 2.37* 2.29* 2.38*   HGB 7.0* 7.5* 7.3* 7.5*   HCT 21.7* 22.6* 22.3* 23.0*   MCV 96 95 97 97   MCH 31.0 31.6 31.9 31.5   MCHC 32.3 33.2 32.7 32.6   RDW 19.7* 19.9* 20.7* 21.2*    251 225 231     INR  Recent Labs   Lab 07/19/21  0632 07/18/21  0637 07/17/21  0506 07/16/21  0616 "   INR 1.80* 1.74* 1.80* 1.77*     LFTs  Recent Labs   Lab 07/19/21  0632 07/18/21  0637 07/17/21  0506 07/15/21  0443   ALKPHOS 190* 242* 175* 182*   AST 63* 128* 87* 86*   ALT 19 29 20 16   BILITOTAL 6.3* 7.4* 7.5* 9.3*   PROTTOTAL 6.7* 7.3 7.0 7.6   ALBUMIN 2.1* 2.2* 2.3* 2.7*      PANC  No lab results found in last 7 days.      IMAGING:  EXAMINATION: CT ABDOMEN PELVIS W CONTRAST, 7/18/2021 11:29 AM     TECHNIQUE:  Helical CT images from the lung bases through the  symphysis pubis were obtained with IV contrast. Contrast: 119 cc  [id number]      COMPARISON: 7/11/2021, 6/30/2021     HISTORY: Abdominal abscess/infection suspected     FINDINGS:     LUNG BASES: Normal heart size, trace pericardial effusion. Moderate  right pleural effusion with associated compressive atelectasis. Tiny  left basilar pleural effusion with associated compressive atelectasis.     ABDOMEN/PELVIS: Percutaneous gastrojejunostomy with tip in the  proximal jejunum. Unremarkable hepatic parenchyma. Ascites along the  inferior right hepatic lobe, extending into the pelvis and the left  paracolic gutter. Mild mural hyperemia of the gallbladder with mild  associated gallbladder thickening. Sequela of necrotizing pancreatitis  with associated 15 x 6 cm peripancreatic walled off necrosis with  drain in place. Overall, there is improved mesenteric stranding  surrounding the necrotic collection. There is viable enhancement of  the uncinate process and pancreatic head as well as distal body and  tail. Spleen measures 16 cm. There is extensive reactive appearing  lymphadenopathy throughout the mesentery and retroperitoneum. There is  poor contrast uptake throughout the renal parenchyma. Visualized  ureters are normal. Normal caliber, edematous loops of small bowel.  Predominantly fluid-filled colon. Appendix is well visualized. Major  abdominal vasculature is patent. No concerning pelvic mass.     BONES: No acute or suspicious osseous abnormality.                                                                       IMPRESSION:  1. Continued sequelae of necrotizing pancreatitis with the 15 x 6 cm  peripancreatic walled off necrosis predominantly involving the lesser  sac with drain in place. Enhancement of the residual viable pancreatic  tissue in the uncinate, body, head, and the majority of the tail.  2. Poor parenchymal enhancement of the kidneys suggesting ATN.  3. Moderate amount of ascites with diffuse peritoneal enhancement and  dependant debris in the pelvis. Peritonitis cannot be ruled out.  4. Right pleural effusion with associated compressive atelectasis.  Trace left pleural effusion.

## 2021-07-19 NOTE — PLAN OF CARE
Neuro: AxOx4, follows commands, moves ext. Pt slept well overnight. PERRLA 3 Brisk  Resp: 92-97% RA. D  Cardiac: 's. -120's. Afebrile  GI/: Anuria related to HD. 3 BM overnight. Tube feed held overnight per pt request, MD notified by day shift RN. Free water flush 30ml/4hr.   Pain: Continued abdominal pain 7-8/10, receiving dilaudid 2mg Q3. Pt refuses dressing changes or cleaning of Jtube/Gtube site due to pain.   Skin: Coccyx/groin reddened, barrier cream applied. No new deficits noted  LDA's: Left arm PICC triple lumen. Gtube to gravity. Jtube. Abd drain with 40cc brown output, Q8 NS flushes.      Plan: Continue with POC, notify primary team with changes.

## 2021-07-19 NOTE — PROGRESS NOTES
Time of notification: 5:50 PM  Provider notified: Maroon 3 resident and Daniela crosscover paged     Patient status:  Patient having new course crackle lung sounds bilaterally. Coughing up thick clear secretions. More tachy 130's-140's with coughing. Not requiring O2 at this time. Requested oxymask for comfort.     Temp:  [97.5  F (36.4  C)-99.9  F (37.7  C)] 99.9  F (37.7  C)  Pulse:  [] 120  Resp:  [16-18] 16  BP: (100-116)/(43-68) 116/68  SpO2:  [96 %-99 %] 96 %    Orders received: chest x-ray and sputum culture ordered.

## 2021-07-19 NOTE — PLAN OF CARE
6B PT - Cancel. Pt has politely refused to work with therapies upon the last 4 check-in (since 7/15) as he reports not feeling well, to fatigue before/after dialysis, or SOB. PT to decrease to 3x/week where OT/PT will alternate days until pt demonstrates consistent participation with therapy. Will reschedule for Wednesday.

## 2021-07-19 NOTE — PROGRESS NOTES
PALLIATIVE CARE SOCIAL WORK Progress Note   [facility] [address]      Met with Dax to introduce self and role. Dax reports that his mood is better here than any other hospital stay before. He feels like the medical teams, nurses, & other providers are listening to him and respecting his choices. He feels that the teams are explaining things in a way that he understands. He reports that he's going to have a procedure on Wed or Fri this week, but wasn't able to communicate exactly what it was going to do. He is hopeful that this procedure will help with his belly pain. He's hoping to get more energy so he can sit up in a chair and play video games.    Plan: PCSW will continue to follow for support while Palliative Care Team is following.    RONNA Sanford, [facility]  Palliative Care Clinical   Pager [phone]    [facility] Inpatient Team Consult Pager [phone] Mon-Fri 8-4:30  After hours Answering Service [phone]

## 2021-07-19 NOTE — PROGRESS NOTES
CLINICAL NUTRITION SERVICES - BRIEF NOTE      Nutrition Prescription     RECOMMENDATIONS FOR MDs/PROVIDERS TO ORDER:  None at this time      Recommendations already ordered by Registered Dietitian (RD):  Ordered Fecal Elastase     Patient currently agreeable to 6 hour tube feeding cycle:   Vital 1.5 @ 70 mL/hr x6 hours (12pm-6pm) = 420 mL, 630 kcal (9 kcal/kg), 28 g protien, 320 mL free water, 45 g CHO, 24 g fat   - 4 Hour hang time with open system- HANG TIME 3 hours with enzymes (see below)  Once begin TFs, begin the following pancreatic enzyme regimen and recommend order each of the following:   (please copy and paste administration instructions into each order)  A) Sodium Bicarb tablet (325 mg), 1 tablet q 4 hrs via Jtube. Administration Instructions: Crush 1 tablet and mix into 15 ml of warm water and use this solution to mix with Creon pancreatic enzymes. DO NOT administer directly into Jtube (to be mixed into TF formula with Creon enzyme - see Creon enzyme order)   B) Creon 24, 1 capsule q 3 hrs via Jtube. Administration Instructions:   --If TF rate is running @ 10-20 ml/hr, administer 0 capsule q 3 hrs;   --If TF rate is running @ 20-70 ml/hr, increase to 1 capsules q 3 hrs.    **Open capsule and empty contents into 15 ml sodium bicarb solution (see sodium bicarb order), let dissolve for about 20-30 minutes and then add this solution to the amount of TF formula hung in TF bag every 3 hrs (i.e., once TF @ goal infusion 70 ml/hr x 6 hours, mix 1 capsule into 210 ml of TF formula every 3 hrs).   *Note: this enzyme regimen with TF @ goal infusion will provide approx 2000 units of lipase/gram of total Fat daily and approp dosing initially for pancreatic insufficiency with more elemental TF formula.   Ordered PERT:   Creon 24, 2 capsules with meals (690 units lipase per g/kg)   -- If patient is unable to swallow capsule, can open capsule and pour beads into acidic food (such as applesauce) and give via spoon.  To  note:   PERT only effective for ~45-60 minutes once ingested; if patient takes a long time to eat a meal/snack may need to re-dose.    Future/Additional Recommendations:  Calorie count ordered by provide. If patient is not able to meet 50% of low end estimated energy/protein needs (1042 kcal and 52 g protein, recommend increase if TF:   Vital 1.5 Leno @ goal of  65ml/hr x 12 hours=  (780ml/day), 1170 kcals (17 kcal/kg), ~53 g PRO (~0.7 g/kg), 596 ml free H20, 146 g CHO, 44 g fat and 4.5 g fiber daily.    May need to consider 3 capules Creon 24 with meals (1036 units lipase/kg)     *Please see full assessment note from 7/16/21    New Findings:  Per review of notes, patient refused tube feeding around 4pm yesterday.     Diet Order: 2g Na + Ensure clear with meals (PRN if pt orders)   Calorie count 7/20-7/23   Calorie count 7/7-7/11 averaged 171 kcal and 7.6 g protein (per information documented)    Nutrition Support:Vital 1.5 Leno @ goal of  65ml/hr (1560ml/day)  will provide: 2340 kcals (33 kcal/kg), 105 g PRO (~1.5), 1191 ml free H20, 291 g CHO, and 9 g fiber daily, 89 g fat, 88 mEq K+, 1951 mg Po4     Patient has refused lactulose x3 days vs held   Having 1-8 stools per days per I/O.     Addendum 7/19: Team paged to start PERT     Interventions  Enteral Nutrition - Modify   Ordered fecal elastase   Ordered Creon with meals and TF     RD to follow per protocol.    Jennifer Castillo RD, LD  6B pager: [phone]

## 2021-07-19 NOTE — PROGRESS NOTES
"Nephrology Progress Note  07/19/2021         Dax Villareal is a 30 year old male with h/o ETOH hepatitis, end stage liver disease, RADHA on HD, transferred from [facility] in [place] for septic shock on 6/28/21 for treatment of necrotizing pancreatitis and decompensated liver disease. He was initially admitted to [facility] 5/19/21.        Interval History :   Mr Villareal had HD yesterday, had ordered HD today but pt refused as he \"just doesn't feel well.\"  No urgent issue although K is high normal at 5.1.  Will plan to run HD tomorrow am.        Assessment & Recommendations:   RADHA-Cr was 0.8 as recently as 5/19/2021 but now HD dependent, started RRT at [facility] prior to transfer to [facility] (exact date unclear but per family it was end of May).  Etiology likely multifactorial with contrast, sepsis/pancreatitis, likely HRS physiology contributing as well.  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  Continuing RRT as long as it is tolerated with regards to BP's, is functional enough to possibly do outpt HD.  MELD is in high 30's so has high 3 month mortality but continuing restorative cares for now.                -Line is TDC from [facility]               -Had short HD yesterday, had planned run today but he refused, K is high normal but not an emergency.  Plan to run tomorrow.       Volume status-Anuric, has abdominal distension but minimal peripheral edema.  Offered run today but he refused, will plan for run early tomorrow.      Electrolytes/pH-K 5.1 and Na 127 after short run yesterday, ordered fluid restriction.        Ca/phos/pth-Ca 7.9, corrects to normal with low albumin.       Anemia-Hgb 7.5, acute management per team, last PRBC's 7/10.       Nutrition-Taking PO, appetite ok.      Discussed with Dr Ryan      Recommendations were communicated to primary team via verbal communication.      SOFIA Marlow CNS  Clinical Nurse Specialist  [phone]    Review of Systems:   I reviewed the following " "systems:  Gen: No fevers or chills  CV: No CP at rest  Resp: No SOB at rest  GI: No N/V    Physical Exam:   I/O last 3 completed shifts:  In: 1940 [P.O.:1040; Other:50; NG/GT:200]  Out: 1140 [Drains:140; Other:1000]   /43 (BP Location: Right arm)   Pulse 118   Temp 97.5  F (36.4  C) (Oral)   Resp 16   Ht 1.676 m (5' 6\")   Wt 77.5 kg (170 lb 13.7 oz)   SpO2 97%   BMI 27.58 kg/m       GENERAL APPEARANCE: Mild distress, + abd pain.    EYES:  scleral icterus, pupils equal  Pulmonary: lungs clear to auscultation. Off supplemental oxygen   CV: tachy   - Edema - no LE edema  GI: large distended, tender. Has retroperitoneal drain  MS: no evidence of inflammation in joints, no muscle tenderness  SKIN: no rash, warm, dry, no cyanosis  NEURO: alert/interactive  ACCESS: Right TDC     Labs:   All labs reviewed by me  Electrolytes/Renal - Recent Labs   Lab Test 07/19/21  0632 07/18/21  0637 07/17/21  2031 07/16/21  0616 07/15/21  0443 07/14/21  0345 07/14/21  0345 07/13/21  0718   * 128* 127* 130*  130* 131*  --  130* 131*   POTASSIUM 5.1 5.6* 5.1 4.0 3.3*  --  4.2 3.6   CHLORIDE 96 97 97 99 98  --  98 98   CO2 21 20 20 20 23  --  20 23   BUN 50* 68* 57* 39* 26  --  42* 29   CR 3.47* 4.46* 4.08* 3.23* 2.30*  --  3.35* 2.60*   GLC 83 164* 142* 151* 131*   < > 137* 94   JANENE 8.6 8.4* 8.3* 7.8* 8.1*  --  8.6 8.5   MAG  --   --   --   --  1.6  --  1.6 1.5*   PHOS  --   --   --  2.0* 3.0  --  4.6* 4.3    < > = values in this interval not displayed.       CBC -   Recent Labs   Lab Test 07/19/21  0632 07/18/21  0637 07/17/21  0506   WBC 21.0* 25.2* 27.3*   HGB 7.0* 7.5* 7.3*    251 225       LFTs -   Recent Labs   Lab Test 07/19/21  0632 07/18/21  0637 07/17/21  0506   ALKPHOS 190* 242* 175*   BILITOTAL 6.3* 7.4* 7.5*   ALT 19 29 20   AST 63* 128* 87*   PROTTOTAL 6.7* 7.3 7.0   ALBUMIN 2.1* 2.2* 2.3*       Iron Panel -   Recent Labs   Lab Test 07/19/21  0632   IRON 31*   IRONSAT 43           Current " Medications:    B and C vitamin Complex with folic acid  5 mL Per Feeding Tube Daily     ciprofloxacin  250 mg Oral Daily     folic acid  1 mg Oral Daily    Or     folic acid  1 mg Intravenous Daily     [START ON 7/20/2021] HYDROmorphone (STANDARD CONC)  2 mg Oral Once     lactulose  20 g Oral or NG Tube 4x Daily     melatonin  6 mg Oral At Bedtime     pantoprazole  40 mg Oral QAM AC     [Held by provider] potassium chloride  20 mEq Oral BID     rifaximin  550 mg Oral BID     sodium chloride (PF)  10 mL Irrigation Q8H     sodium chloride (PF)  9 mL Intracatheter During Dialysis/CRRT (from stock)     vitamin B1  100 mg Oral Daily    Or     thiamine  100 mg Intravenous Daily       - MEDICATION INSTRUCTIONS -       dextrose

## 2021-07-19 NOTE — PLAN OF CARE
Neuro: Patient alert and oriented x4. Refused multiple cares today, including meds, paracentesis, and dialysis.   Cardiac: 's and 130's with coughing fits. BP's stable. Afebrile.             Respiratory: Sating high 90s on RA. Requested oxygen for comfort. Lung sounds course (MD aware and to bedside to see patient).   GI/: Aneuric. Loose BMs x1. Stool sample sent.   Diet/appetite: Tolerating TF running at 70 mls/hour with 30cc flushes Q4 hours. 12p-6p cycled. Eating 2g Na diet.   Activity: Total lift assist to be out of bed. Refused. Turns when patient allows.   Pain: Reports pain in abdomen and back. Dilaudid given PO q3 hours.  Skin: Coccyx red but blanchable. Barrier cream applied. Skin dry.   LDA's: G and J tube, triple lumen PICC, abdominal drain dressing change refused.       Plan: Paracentesis and dialysis tomorrow. Awaiting chest x-ray and sputum sample. Will continue to monitor.

## 2021-07-19 NOTE — PROGRESS NOTES
Resident/Fellow Attestation   I, Clarissa Cleveland, was present with the medical/NAHUM student who participated in the service and in the documentation of the note.  I have verified the history and personally performed the physical exam and medical decision making.  I agree with the assessment and plan of care as documented in the note.        Clarissa Cleveland MD  PGY1  Date of Service (when I saw the patient): 07/19/21    [facility]    Progress Note - Maroon 3 Service        Date of Admission:  6/28/2021    Assessment & Plan   Dax Villareal is a 30 year old male with hx of alcohol use disorder admitted on 6/28/2021 after recent prolonged admission at [facility] for acute alcoholic hepatitis who has ESLD c/b HE, ARF requiring HD, and SBP and malnutrition in the setting of acute necrotizing pancreatitis, s/p PEG-J placement on 7/13. He arrived with acute respiratory failure and septic shock that have resolved, and his SBP has been appropriately treated. He requires continued management for ARF, hepatic encephalopathy, infected pancreatic pseudocyst, and nutritional support via PEG-J.       Changes today:   - Restarted TFs from 12-6 pm today  - Calorie Counts started today, will change TF schedule and/or frequency if taking in a decent amount of calories via PO  - Requested palliative eval regarding refusal of cares and pain control, will see him tomorrow 7/20  - Start cipro 250 mg daily for SBP ppx  - Possible therapeutic para tomorrow at noon; patient refused today  - Dialysis tomorrow   - Plan to start pancreatic enzymes per GI/dietician, will check fecal elastase      #Necrotizing pancreatitis w/ infected pseudocyst s/p RP Drain, improving  #Septic Shock, resolved   #SBP, resolved  #Leukocytosis, acute rise iso down trending WBCs  #Peripancreatic fluid collection growing VRE   Etiology of the septic shock that he came in with likely due to necrotizing pancreatitis,  infected pseudocyst, and SBP. Had percutaneous RP drain placed for drainage of pancreatic collection. Diagnostic para returned with SBP. Repeat CT 7/11 showed significant decrease in size of walled off necrosis and unchanged moderate ascites. S/p therapeutic paracentesis 7/13 (1L removed).      7/15-7/16 overnight, pt febrile to 100.8 and has increased leukocytosis and tachycardia. Underwent infectious work up (CXR, diagnostic para, sputum/blood cultures) all of which were unrevealing. Per discussion with ID, ongoing leukocytosis is not unexpected with nec panc. CT A/P 7/18 similar compared to prior, no new abscess or evidence of acute infection. CT showed unchanged moderate ascites; asked pt about therapeutic para, explained that it may relieve some abdominal discomfort. Additionally GI feels cystgastrostomy could potentially be helpful due to unchanged size of fluid collection on repeat CT but patient declined for now, will continue to address.     - Daily CBC  - MAP goal > 65   - GI following, appreciate recs   - Possible future plan for cystgastrostomy    - start pancreatic enzymes    - For back pain: Tylenol PRN and Dilaudid 2mg q3h PRN  - Start cipro ppx for SBP; 250 mg daily, ok to give through tube  - Possible therapeutic para tomorrow; patient refused today    # Severe malnutrition in setting of chronic illness  PEG-J placed 7/13. Procedure went well, patient has reached goal of 65 mL/hr but has increased abdominal pain and requested TFs be turned off on 7/18. Patient has had ~1L PO intake for past few days - if this includes an adequate amount of calories, may be sufficient enough to decrease TF rate/only administer for part of the day. FWF 30 mL q4h. Will keep G tube open/vented.   - Ordered Calorie Counts to start 7/19  - Dietitian consult for new tube feeds, appreciate recs              - Continue TFs, will work on consolidating in coming days   - Regular Diet, thin liquids per SLP  - Continue  multivitamins  - Pantoprazole 40 mg IV daily  - Zofran PRN     # Decompensated EtOH cirrhosis c/b hepatic encephalopathy and recurrent ascites  # Hx of Alcohol use disorder c/b withdrawal   # Anemia  # Coagulopathy   Patient with a history of marked alcohol use and alcoholic hepatitis in the past. Was given a course of steroids (1 week) during previous admission at OSH. Patient mental status has been stable, alert and oriented x4 for a week, though on 7/16 patient is very fatigued. Still seems clear from an HE standpoint, well managed on lactulose regimen. Patient has consistently had 1-2 pRBC infusions weekly d/t Hgb drops of 0.3-0.5 every day (etilogy likely chronic liver disease, chronic kidney disease, phlebotomy, dilution, poor nutrition, ect). Today, Hgb at 7.0, anticipate another pRBC infusion tomorrow.   - Lactulose 20g QID and PRN and rifaximin 550mg BID (titrate to 1.5L stool/day)  - Trend MELD labs  - Daily folic acid and thiamine  - Delirium precautions  - Melatonin 6mg at bedtime  - Daily CBC   - Transfuse if hemoglobin < 7, platelet < 10 or < 30 with bleeding  - Continue to monitor for signs of bleeding    MELD-Na score: 35 at 7/19/2021  6:32 AM  MELD score: 32 at 7/19/2021  6:32 AM  Calculated from:  Serum Creatinine: 3.47 mg/dL at 7/19/2021  6:32 AM  Serum Sodium: 127 mmol/L at 7/19/2021  6:32 AM  Total Bilirubin: 6.3 mg/dL at 7/19/2021  6:32 AM  INR(ratio): 1.80 at 7/19/2021  6:32 AM  Age: 30 years     # ARF on HD (MWF)  # Hypervolemia causing acute respiratory failure, resolved  Patient refused dialysis on Saturday, received dialysis Sunday 7/18 instead.     > Hemodialysis as per renal- plan for tomorrow               - Midodrine 10 mg PRN before dialysis sessions     > Daily chemistry labs, strict I/O, and daily weights  - Aspiration precautions  - Supplemental oxygen as needed to keep sats above 89%     # Goals of Care  Please see additional interval note for in depth details regarding  conversation on 7/9 about goals of care. Patient's long term prognosis is still quite poor given his multi organ failure. Care conference on 7/15, pt did not wish to participate. Met with sisters Noni (on phone) and Melyssa and close friend Leroy with palliative, nephrology and general medicine teams today to discuss Dax's current improving status, poor prognosis and wishes to continue full cares. Plan to meet Fridays at 4 pm with teams and family for weekly care conferences.    7/19 update: Over the weekend, pt has been refusing cares (dialysis, repositioning, feeds, etc) despite expressing wishes to continue with full cares previously. Concerned about withdrawn mood and refusal of cares, called palliative team on 7/19 and asked if they could revisit a goals of care discussion (including his interest in a cystogastrostomy) and also give any recs on pain control. They plan on seeing him tomorrow, 7/20.     Diet: Adult Formula Drip Feeding: Continuous Vital 1.5; Jejunostomy; Goal Rate: 65; mL/hr; Medication - Feeding Tube Flush Frequency: At least 15-30 mL water before and after medication administration and with tube clogging; Amount to Send (Nutrition us...  2 Gram Sodium Diet  Snacks/Supplements Adult: Ensure Clear; With Meals  Calorie Counts    DVT Prophylaxis: Pneumatic Compression Devices  Rdz Catheter: Not present  Fluids: 5 mL NS TKO  Central Lines: PRESENT  PICC Triple Lumen 06/28/21 Left-Site Assessment: WDL  CVC Double Lumen Right-Site Assessment: WDL  Code Status: Full Code      Disposition Plan   Expected discharge: 07/23/2021 recommended to transitional care unit once nutrition improved, infected pancreatic pseudocyst is well managed and plan for dialysis is in place.     The patient's care was discussed with the Attending Physician, Dr. Baker.    Jaime Amin, MS3  Medical Student  [address]  Securely message with the Vocera Web Console  "(learn more here)  Text page via [facility] Paging/Directory  Please see sign in/sign out for up to date coverage information  ____________________________________________________________________    Interval History   No events overnight. Nursing notes reviewed. Refused TFs last night.    This morning, patient was more receptive to conversation and asked for help repositioning. Still has abdominal pain and wants a day \"to heal\" without procedures (dialysis, therapeutic para, etc). PO intake is adequate, receptive to the idea of eating/drinking more PO and having shorter intervals of TFs. Stooling consistently and frequently. Pain well controlled on newer regimen of dilaudid q3h.    Data reviewed today: I reviewed all medications, new labs and imaging results over the last 24 hours.    Physical Exam   Vital Signs: Temp: 98.1  F (36.7  C) Temp src: Oral BP: 112/62 Pulse: 96   Resp: 16 SpO2: 97 % O2 Device: None (Room air) Oxygen Delivery: 2.5 LPM  Weight: 170 lbs 13.7 oz  Gen: Lying down in bed, appears uncomfortable but less fatigued than last few days. More interactive  HEENT: Conjunctival icterus  CV: Tachycardic. Regular rhythm.   Resp: Breathing comfortably on RA  Abd: Refused palpation today, PEG J in place without surrounding erythema   Skin: Jaundiced.  Ext: Well perfused, no edema  Neuro: Alert, responding appropriately to questions     Data   Recent Labs   Lab 07/19/21  0632 07/18/21  0637 07/17/21  2031 07/17/21  0506   WBC 21.0* 25.2*  --  27.3*   HGB 7.0* 7.5*  --  7.3*   MCV 96 95  --  97    251  --  225   INR 1.80* 1.74*  --  1.80*   * 128* 127* 130*   POTASSIUM 5.1 5.6* 5.1 5.0   CHLORIDE 96 97 97 99   CO2 21 20 20 20   BUN 50* 68* 57* 51*   CR 3.47* 4.46* 4.08* 3.77*   ANIONGAP 10 11 10 11   JANENE 8.6 8.4* 8.3* 7.9*   GLC 83 164* 142* 136*   ALBUMIN 2.1* 2.2*  --  2.3*   PROTTOTAL 6.7* 7.3  --  7.0   BILITOTAL 6.3* 7.4*  --  7.5*   ALKPHOS 190* 242*  --  175*   ALT 19 29  --  20   AST 63* 128*  " --  87*     Recent Results (from the past 24 hour(s))   CT Abdomen Pelvis w Contrast    Narrative    EXAMINATION: CT ABDOMEN PELVIS W CONTRAST, 7/18/2021 11:29 AM    TECHNIQUE:  Helical CT images from the lung bases through the  symphysis pubis were obtained with IV contrast. Contrast: 119 cc  [id number]     COMPARISON: 7/11/2021, 6/30/2021    HISTORY: Abdominal abscess/infection suspected    FINDINGS:    LUNG BASES: Normal heart size, trace pericardial effusion. Moderate  right pleural effusion with associated compressive atelectasis. Tiny  left basilar pleural effusion with associated compressive atelectasis.    ABDOMEN/PELVIS: Percutaneous gastrojejunostomy with tip in the  proximal jejunum. Unremarkable hepatic parenchyma. Ascites along the  inferior right hepatic lobe, extending into the pelvis and the left  paracolic gutter. Mild mural hyperemia of the gallbladder with mild  associated gallbladder thickening. Sequela of necrotizing pancreatitis  with associated 15 x 6 cm peripancreatic walled off necrosis with  drain in place. Overall, there is improved mesenteric stranding  surrounding the necrotic collection. There is viable enhancement of  the uncinate process and pancreatic head as well as distal body and  tail. Spleen measures 16 cm. There is extensive reactive appearing  lymphadenopathy throughout the mesentery and retroperitoneum. There is  poor contrast uptake throughout the renal parenchyma. Visualized  ureters are normal. Normal caliber, edematous loops of small bowel.  Predominantly fluid-filled colon. Appendix is well visualized. Major  abdominal vasculature is patent. No concerning pelvic mass.    BONES: No acute or suspicious osseous abnormality.      Impression    IMPRESSION:  1. Continued sequelae of necrotizing pancreatitis with the 15 x 6 cm  peripancreatic walled off necrosis predominantly involving the lesser  sac with drain in place. Enhancement of the residual viable pancreatic  tissue in  the uncinate, body, head, and the majority of the tail.  2. Poor parenchymal enhancement of the kidneys suggesting ATN.  3. Moderate amount of ascites with diffuse peritoneal enhancement and  dependant debris in the pelvis. Peritonitis cannot be ruled out.  4. Right pleural effusion with associated compressive atelectasis.  Trace left pleural effusion.    I have personally reviewed the examination and initial interpretation  and I agree with the findings.    LISA LYNCH MD         SYSTEM ID:  [id number]

## 2021-07-20 PROBLEM — K85.91 NECROTIZING PANCREATITIS: Status: ACTIVE | Noted: 2021-06-28

## 2021-07-20 LAB
ABO/RH(D): NORMAL
ALBUMIN SERPL-MCNC: 2.1 G/DL (ref 3.4–5)
ALP SERPL-CCNC: 180 U/L (ref 40–150)
ALT SERPL W P-5'-P-CCNC: 16 U/L (ref 0–70)
ANION GAP SERPL CALCULATED.3IONS-SCNC: 13 MMOL/L (ref 3–14)
ANTIBODY SCREEN: NEGATIVE
AST SERPL W P-5'-P-CCNC: 50 U/L (ref 0–45)
BILIRUB SERPL-MCNC: 6.3 MG/DL (ref 0.2–1.3)
BUN SERPL-MCNC: 62 MG/DL (ref 7–30)
CALCIUM SERPL-MCNC: 8.6 MG/DL (ref 8.5–10.1)
CHLORIDE BLD-SCNC: 94 MMOL/L (ref 94–109)
CO2 SERPL-SCNC: 19 MMOL/L (ref 20–32)
CREAT SERPL-MCNC: 4.22 MG/DL (ref 0.66–1.25)
ERYTHROCYTE [DISTWIDTH] IN BLOOD BY AUTOMATED COUNT: 18.6 % (ref 10–15)
GFR SERPL CREATININE-BSD FRML MDRD: 18 ML/MIN/1.73M2
GLUCOSE BLD-MCNC: 103 MG/DL (ref 70–99)
HCT VFR BLD AUTO: 21.2 % (ref 40–53)
HGB BLD-MCNC: 7 G/DL (ref 13.3–17.7)
INR PPP: 1.75 (ref 0.85–1.15)
LACTATE SERPL-SCNC: 1.4 MMOL/L (ref 0.7–2)
MCH RBC QN AUTO: 31.3 PG (ref 26.5–33)
MCHC RBC AUTO-ENTMCNC: 33 G/DL (ref 31.5–36.5)
MCV RBC AUTO: 95 FL (ref 78–100)
PLATELET # BLD AUTO: 271 10E3/UL (ref 150–450)
POTASSIUM BLD-SCNC: 5.4 MMOL/L (ref 3.4–5.3)
PROT SERPL-MCNC: 7.1 G/DL (ref 6.8–8.8)
RBC # BLD AUTO: 2.24 10E6/UL (ref 4.4–5.9)
SODIUM SERPL-SCNC: 126 MMOL/L (ref 133–144)
SPECIMEN EXPIRATION DATE: NORMAL
WBC # BLD AUTO: 24.3 10E3/UL (ref 4–11)

## 2021-07-20 PROCEDURE — 250N000013 HC RX MED GY IP 250 OP 250 PS 637: Performed by: INTERNAL MEDICINE

## 2021-07-20 PROCEDURE — 250N000013 HC RX MED GY IP 250 OP 250 PS 637: Performed by: STUDENT IN AN ORGANIZED HEALTH CARE EDUCATION/TRAINING PROGRAM

## 2021-07-20 PROCEDURE — 250N000011 HC RX IP 250 OP 636: Performed by: INTERNAL MEDICINE

## 2021-07-20 PROCEDURE — 85610 PROTHROMBIN TIME: CPT | Performed by: INTERNAL MEDICINE

## 2021-07-20 PROCEDURE — 83605 ASSAY OF LACTIC ACID: CPT | Performed by: HOSPITALIST

## 2021-07-20 PROCEDURE — 99232 SBSQ HOSP IP/OBS MODERATE 35: CPT | Performed by: PHYSICIAN ASSISTANT

## 2021-07-20 PROCEDURE — 250N000009 HC RX 250: Performed by: INTERNAL MEDICINE

## 2021-07-20 PROCEDURE — 250N000013 HC RX MED GY IP 250 OP 250 PS 637

## 2021-07-20 PROCEDURE — 120N000005 HC R&B MS OVERFLOW UMMC

## 2021-07-20 PROCEDURE — 80053 COMPREHEN METABOLIC PANEL: CPT | Performed by: INTERNAL MEDICINE

## 2021-07-20 PROCEDURE — 36592 COLLECT BLOOD FROM PICC: CPT | Performed by: INTERNAL MEDICINE

## 2021-07-20 PROCEDURE — 250N000011 HC RX IP 250 OP 636: Performed by: STUDENT IN AN ORGANIZED HEALTH CARE EDUCATION/TRAINING PROGRAM

## 2021-07-20 PROCEDURE — 85027 COMPLETE CBC AUTOMATED: CPT | Performed by: INTERNAL MEDICINE

## 2021-07-20 PROCEDURE — 99233 SBSQ HOSP IP/OBS HIGH 50: CPT | Performed by: PHYSICIAN ASSISTANT

## 2021-07-20 PROCEDURE — 36592 COLLECT BLOOD FROM PICC: CPT | Performed by: HOSPITALIST

## 2021-07-20 PROCEDURE — 99233 SBSQ HOSP IP/OBS HIGH 50: CPT | Mod: GC | Performed by: INTERNAL MEDICINE

## 2021-07-20 PROCEDURE — 99233 SBSQ HOSP IP/OBS HIGH 50: CPT | Performed by: STUDENT IN AN ORGANIZED HEALTH CARE EDUCATION/TRAINING PROGRAM

## 2021-07-20 PROCEDURE — 258N000003 HC RX IP 258 OP 636: Performed by: STUDENT IN AN ORGANIZED HEALTH CARE EDUCATION/TRAINING PROGRAM

## 2021-07-20 PROCEDURE — 90937 HEMODIALYSIS REPEATED EVAL: CPT

## 2021-07-20 PROCEDURE — 86901 BLOOD TYPING SEROLOGIC RH(D): CPT | Performed by: STUDENT IN AN ORGANIZED HEALTH CARE EDUCATION/TRAINING PROGRAM

## 2021-07-20 PROCEDURE — 258N000003 HC RX IP 258 OP 636: Performed by: CLINICAL NURSE SPECIALIST

## 2021-07-20 RX ADMIN — HYDROXYZINE HYDROCHLORIDE 50 MG: 25 TABLET, FILM COATED ORAL at 20:02

## 2021-07-20 RX ADMIN — HYDROMORPHONE HYDROCHLORIDE 2 MG: 1 SOLUTION ORAL at 20:33

## 2021-07-20 RX ADMIN — HYDROMORPHONE HYDROCHLORIDE 2 MG: 1 SOLUTION ORAL at 16:57

## 2021-07-20 RX ADMIN — MELATONIN TAB 3 MG 6 MG: 3 TAB at 00:08

## 2021-07-20 RX ADMIN — SODIUM CHLORIDE 250 ML: 9 INJECTION, SOLUTION INTRAVENOUS at 08:44

## 2021-07-20 RX ADMIN — SODIUM CHLORIDE 300 ML: 9 INJECTION, SOLUTION INTRAVENOUS at 08:44

## 2021-07-20 RX ADMIN — HYDROMORPHONE HYDROCHLORIDE 1 MG: 1 SOLUTION ORAL at 08:16

## 2021-07-20 RX ADMIN — Medication 5 ML: at 13:35

## 2021-07-20 RX ADMIN — CIPROFLOXACIN 250 MG: 250 TABLET ORAL at 13:33

## 2021-07-20 RX ADMIN — BENZONATATE 100 MG: 100 CAPSULE ORAL at 20:45

## 2021-07-20 RX ADMIN — PANCRELIPASE 2 CAPSULE: 24000; 76000; 120000 CAPSULE, DELAYED RELEASE PELLETS ORAL at 16:52

## 2021-07-20 RX ADMIN — Medication: at 08:44

## 2021-07-20 RX ADMIN — HYDROMORPHONE HYDROCHLORIDE 1 MG: 1 SOLUTION ORAL at 09:40

## 2021-07-20 RX ADMIN — MIDODRINE HYDROCHLORIDE 10 MG: 5 TABLET ORAL at 08:03

## 2021-07-20 RX ADMIN — HYDROMORPHONE HYDROCHLORIDE 2 MG: 1 SOLUTION ORAL at 13:31

## 2021-07-20 RX ADMIN — Medication 40 MG: at 13:35

## 2021-07-20 RX ADMIN — Medication 550 MG: at 20:45

## 2021-07-20 RX ADMIN — THIAMINE HYDROCHLORIDE 100 MG: 100 INJECTION, SOLUTION INTRAMUSCULAR; INTRAVENOUS at 13:35

## 2021-07-20 RX ADMIN — FOLIC ACID 1 MG: 1 TABLET ORAL at 13:33

## 2021-07-20 RX ADMIN — HYDROMORPHONE HYDROCHLORIDE 2 MG: 1 SOLUTION ORAL at 02:29

## 2021-07-20 RX ADMIN — Medication 550 MG: at 08:03

## 2021-07-20 RX ADMIN — PHYTONADIONE 10 MG: 10 INJECTION, EMULSION INTRAMUSCULAR; INTRAVENOUS; SUBCUTANEOUS at 16:50

## 2021-07-20 RX ADMIN — BENZONATATE 100 MG: 100 CAPSULE ORAL at 00:08

## 2021-07-20 RX ADMIN — HYDROMORPHONE HYDROCHLORIDE 2 MG: 1 SOLUTION ORAL at 05:26

## 2021-07-20 ASSESSMENT — ACTIVITIES OF DAILY LIVING (ADL)
ADLS_ACUITY_SCORE: 20
ADLS_ACUITY_SCORE: 18
ADLS_ACUITY_SCORE: 20
ADLS_ACUITY_SCORE: 20
ADLS_ACUITY_SCORE: 18

## 2021-07-20 ASSESSMENT — MIFFLIN-ST. JEOR: SCORE: 1682.75

## 2021-07-20 NOTE — PROGRESS NOTES
Resident/Fellow Attestation   I, Clarissa Cleveland, was present with the medical/NAHUM student who participated in the service and in the documentation of the note.  I have verified the history and personally performed the physical exam and medical decision making.  I agree with the assessment and plan of care as documented in the note.      Clarissa Cleveland MD  PGY1  Date of Service (when I saw the patient): 07/20/21    [facility]    Progress Note - [facility] 3 Service        Date of Admission:  6/28/2021    Assessment & Plan   Dax Villareal is a 30 year old male with hx of alcohol use disorder admitted on 6/28/2021 after recent prolonged admission at [facility] for acute alcoholic hepatitis who has ESLD c/b HE, ARF requiring HD, and SBP and malnutrition in the setting of acute necrotizing pancreatitis, s/p PEG-J placement on 7/13. He arrived with acute respiratory failure and septic shock that have resolved, and his SBP has been appropriately treated. He requires continued management for ARF, hepatic encephalopathy, infected pancreatic pseudocyst, and nutritional support via PEG-J.       Changes today:   - Therapeutic para scheduled tomorrow with IR  - Cystogastrostomy tomorrow, GI will coordinate with IR     - IV Vit K, NPO at MN, holding TF at MN, INR check in AM  - Dialyzed today       #Necrotizing pancreatitis w/ infected pseudocyst s/p RP Drain, improving  #Septic Shock, resolved   #SBP, resolved  #Leukocytosis, acute rise iso down trending WBCs  #Peripancreatic fluid collection growing VRE   Etiology of the septic shock that he came in with likely due to necrotizing pancreatitis, infected pseudocyst, and SBP. Had percutaneous RP drain placed for drainage of pancreatic collection. Diagnostic para returned with SBP. Repeat CT 7/11 showed significant decrease in size of walled off necrosis and unchanged moderate ascites. S/p therapeutic paracentesis 7/13 (1L  removed).      Per discussion with ID, ongoing leukocytosis is not unexpected with nec panc. CT 7/18 showed unchanged moderate ascites. GI feels cystgastrostomy could potentially be helpful due to unchanged size of  Peripancreatic fluid collcetion.     - Daily CBC  - MAP goal > 65 '  - Therapuetic para tomorrow by IR   - GI following, appreciate recs               - Plan for cystogastrostomy tomorrow                - start pancreatic enzymes    - For back pain: Tylenol PRN and Dilaudid 2mg q3h PRN  - Cipro ppx for SBP; 250 mg daily, ok to give through tube    #New onset productive cough with RLL haziness  7/19 evening, patient developed increasing cough with thick, clear secretions. Sputum culture unremarkable, 2+ mixed fausto likely from oral cavity bacteria. CXR showed increased RLL haziness; differential includes pleural effusion v pneumonia v atelectasis. Most likely effusion as patient is likely fluid up due to declining dialysis and therapeutic para; unlikely pneumonia as sputum is non-purulent and patient is afebrile. However, there is a low threshold for further infectious workup if patient becomes febrile or clinically deteriorates.   - Repeat CXR 7/22 after therapetic para  - ID following pt; if pt worsens, discuss with ID     # Severe malnutrition in setting of chronic illness  PEG-J placed 7/13. Procedure went well, patient and nutrition have discussed plan to run TFs during the day only from 12-6 pm at 70 mL/hr and work on PO intake. FWF 30 mL q4h. Will keep G tube open/vented.   - Continue Calorie Counts  - Dietitian consult for new tube feeds, appreciate recs              - Continue TFs at 70 mL/hr, 12-6 pm    - Cont panc enzymes   - Regular Diet, thin liquids per SLP  - Continue multivitamins  - Pantoprazole 40 mg IV daily  - Zofran PRN     # Decompensated EtOH cirrhosis c/b hepatic encephalopathy and recurrent ascites  # Hx of Alcohol use disorder c/b withdrawal   # Anemia  # Coagulopathy   Patient  with a history of marked alcohol use and alcoholic hepatitis in the past. Was given a course of steroids (1 week) during previous admission at OSH. Patient mental status has been stable, alert and oriented x4 for a couple weeks, well managed on lactulose regimen. Patient has consistently had 1-2 pRBC infusions weekly d/t Hgb drops of 0.3-0.5 every day (etilogy likely chronic liver disease, chronic kidney disease, phlebotomy, dilution, poor nutrition, ect). Today, Hgb stable at 7.0.   - Lactulose 20g QID and PRN and rifaximin 550mg BID (titrate to 1.5L stool/day)  - Trend MELD labs  - Daily folic acid and thiamine  - Delirium precautions  - Melatonin 6mg at bedtime  - Daily CBC   - Transfuse if hemoglobin < 7, platelet < 10 or < 30 with bleeding  - Continue to monitor for signs of bleeding    MELD-Na score: 36 at 7/20/2021  6:01 AM  MELD score: 33 at 7/20/2021  6:01 AM  Calculated from:  Serum Creatinine: 4.22 mg/dL (Using max of 4 mg/dL) at 7/20/2021  6:01 AM  Serum Sodium: 126 mmol/L at 7/20/2021  6:01 AM  Total Bilirubin: 6.3 mg/dL at 7/20/2021  6:01 AM  INR(ratio): 1.75 at 7/20/2021  6:01 AM  Age: 30 years     # ARF on HD (MWF)  # Hypervolemia causing acute respiratory failure, resolved     > Hemodialysis as per renal - today 7/20              - Midodrine 10 mg PRN before dialysis sessions     > Daily chemistry labs, strict I/O, and daily weights  - Aspiration precautions  - Supplemental oxygen as needed to keep sats above 89%     # Goals of Care  Please see additional interval note for in depth details regarding conversation on 7/9 about goals of care. Patient's long term prognosis is still quite poor given his multi organ failure. Care conference on 7/15, pt did not wish to participate. Met with sisters Noni (on phone) and Melyssa and close friend Leroy with palliative, nephrology and general medicine teams today to discuss Dax's current improving status, poor prognosis and wishes to continue full cares. Plan to  meet Fridays at 4 pm with teams and family for weekly care conferences.  - Check in with GI about available times during the week and coordinate with family about other possible times so that GI would be able to join in on care conferences     Diet: 2 Gram Sodium Diet  Snacks/Supplements Adult: Ensure Clear; With Meals  Calorie Counts  Fluid restriction 1200 ML FLUID  Adult Formula Drip Feeding: Specified Time Vital 1.5; Jejunostomy; Goal Rate: 70; mL/hr; From: 12:00 PM; 6:00 PM; Medication - Feeding Tube Flush Frequency: At least 15-30 mL water before and after medication administration and with tube clogging;...    DVT Prophylaxis: Pneumatic Compression Devices  Rdz Catheter: Not present  Fluids: none  Central Lines: PRESENT  PICC Triple Lumen 06/28/21 Left-Site Assessment: WDL  CVC Double Lumen Right-Site Assessment: WDL  Code Status: Full Code      Disposition Plan   Expected discharge: 07/23/2021 recommended to transitional care unit once nutrition improved, infected pancreatic pseudocyst is well managed and plan for dialysis is in place.     The patient's care was discussed with the Attending Physician, Dr. South.    Jaime Amin, MS3  Medical Student  [address]  Securely message with the Vocera Web Console (learn more here)  Text page via [employer] Paging/Directory  Please see sign in/sign out for up to date coverage information  ___________________________________________________________________    Interval History   Nursing notes reviewed. Patient experienced increased dyspnea and productive cough last night; work up included CXR and sputum culture. No fever, abdominal or chest pain, no n/v.    This afternoon, patient still feeling slightly short of breath but improved from before. Cough has also improved. He is using oxygen, though nursing staff say it is more for comfort rather than necessity. He appears comfortable and pain is well  managed.    Data reviewed today: I reviewed all medications, new labs and imaging results over the last 24 hours. I personally reviewed the chest x-ray image(s) showing increased haziness of RLL field.    Physical Exam   Vital Signs: Temp: 98.4  F (36.9  C) Temp src: Oral BP: 122/54 Pulse: 120   Resp: 14 SpO2: 94 % O2 Device: None (Room air) Oxygen Delivery: 3 LPM  Weight: 173 lbs .98 oz    Gen: Lying down in bed, appears comfortable and tired, but less fatigued than last few days. More interactive, answering questions appropriately.   HEENT: Conjunctival icterus  CV: Tachycardic. Regular rhythm.   Resp: Breathing comfortably with supplemental oxygen, moves oxygen mask on and off frequently. Upper lobes CTA, crackles in RLL.  Abd: Refused palpation today, PEG J in place without surrounding erythema   Skin: Jaundiced.  Ext: Well perfused, no edema  Neuro: Alert and oriented, responding appropriately to questions      Data   Recent Labs   Lab 07/20/21  0601 07/19/21  0632 07/18/21  0637   WBC 24.3* 21.0* 25.2*   HGB 7.0* 7.0* 7.5*   MCV 95 96 95    251 251   INR 1.75* 1.80* 1.74*   * 127* 128*   POTASSIUM 5.4* 5.1 5.6*   CHLORIDE 94 96 97   CO2 19* 21 20   BUN 62* 50* 68*   CR 4.22* 3.47* 4.46*   ANIONGAP 13 10 11   JANENE 8.6 8.6 8.4*   * 83 164*   ALBUMIN 2.1* 2.1* 2.2*   PROTTOTAL 7.1 6.7* 7.3   BILITOTAL 6.3* 6.3* 7.4*   ALKPHOS 180* 190* 242*   ALT 16 19 29   AST 50* 63* 128*     Recent Results (from the past 24 hour(s))   XR Chest Port 1 View    Narrative    Portable chest    INDICATION: Coarse breath sounds, increased sputum production    COMPARISON: 7/16/2021    FINDINGS: Increased haziness in the right lower lung field may  indicate pleural effusion recommend follow-up to clearing to exclude  infection. Right IJ catheter tip is at the cavoatrial junction. Left  PICC tip is also at the cavoatrial junction. Heart size appears  normal.      Impression    IMPRESSION: Increased haziness of right  lower lung field. Possible  effusion and/or pneumonia versus atelectasis. Recommend follow-up to  clearing.    CHELLE STARKS MD         SYSTEM ID:  [id number]

## 2021-07-20 NOTE — PROGRESS NOTES
[facility]  Palliative Care Daily Progress Note       Recommendations & Counseling       Continues to have restorative goals and open to further procedures including EUS cystgastrotomy offered by GI    Would like to assign his sister Nnoi as medical decision maker - believes he has healthcare directives on file somewhere, will look into this    Will need ongoing discussions regarding disease understanding and prognosis- pt was too fatigued to continue with this discussion today     Pain appears well controlled with current treatment    Discussed with Dr. Avendano.    Ashley Chirinos DO  [facility] Internal Medicine Resident, PGY-3    Patient seen and evaluated with Dr. Chirinos.    Agree with assessment and recommendations.    Total time spent was 40 minutes,  >50% of time was spent counseling and/or coordination of care regarding goals, symptoms and support for patient with liver failure, kidney failure, necrotizing pancreatitis.    Alexandria Avendano  Palliative Care   Pager [phone]        Assessments          Dax Villareal is a 30 year old male with PMH that includes EtOH hepatitis, ESLD with MELD-Na of 36 today 7/20, RADHA now dialysis-dependent. Pt was transferred to [facility] from OSH in [place] on 6/28/21 for further management of necrotizing pancreatitis with presumed infected wall off necrotic collection s/p RP drain, decompensated liver disease and septic shock. Most recent imaging 7/18 noting walled off peripancreatic fluid collection still large and amenable to eudoscopic transluminal drainage per GI. However, patient is currently declining intervention. Pt with difficulty maintaining nutrition, initially had stated that he does not want artificial nutrition, but now has PEG-J.    Today, the patient was seen for:  Goals of care discussions in light of intermittently refusing multiple interventions (dialysis, paracentesis, EUS cystgastrotomy) that do not seem to align with current  restorative goals of care    On discussion, patient was alert and oriented x4. Patient reports he is willing to proceed with interventions for his pancreatitis and ESLD even if it may be uncomfortable. His pain is currently weHe reports he had refused several doses of lactulose as made him have diarrhea, but did not seem clear on indications for lactulose. His goals remain restorative and he is open to living in TCU following discharge from hospital. He assigns his sister Noni as his medical decision maker and believes he has a healthcare directive on file somewhere. He became winded after about 10 minutes of conversation, though O2 sats remained mid 90s. He asked that we return another time to continue discussions. Coping mechanisms, understanding of illness and prognosis were not discussed today.    Prognosis, Goals, or Advance Care Planning was addressed today with: No.  Mood, coping, and/or meaning in the context of serious illness were addressed today: No.  Summary/Comments: will readdress at later time            Interval History:     Chart review/discussion with unit or clinical team members:   Refused HD and paracentesis yesterday  Refused lactulose several days  Hesitant about EUS cystgastrotomy offered by GI as may increase abdominal pain    Per patient or family/caregivers today:  N/A    Key Palliative Symptoms:  We are not managing pain in this patient  We are not managing dyspnea in this patient  We are not managing nausea in this patient  We are not managing anxiety in this patient    Patient is on opioids: assessed and bowels ok/no needed changes to plan of care today.           Review of Systems:     Besides above, an additional respiratory system ROS was reviewed and is unremarkable          Medications:     I have reviewed this patient's medication profile and medications during this hospitalization.               Physical Exam:   Vitals were reviewed  Temp: 97.9  F (36.6  C) Temp src: Oral BP:  97/46 Pulse: 120   Resp: 16 SpO2: 94 % O2 Device: None (Room air) Oxygen Delivery: 3 LPM  Gen: sitting/lying in bed. Appears fatigued and dyspneic with talking  HEENT: NCAT. Conjunctiva clear. Sclera icteric .  CV: tachycardic, Peripheral perfusion intact.   Resp: increased work of breathing, on 6L nc  Abd: soft, nt, distended, +BS   Msk: no gross deformity, + sarcopenia  Skin:  + jaundice  Ext: warm, well perfused.   Neuro: face symmetric. EOM, vision, hearing grossly intact. Speech fluent. Moves all extremities   Mental status/Psych: fatigued but awake. Oriented to basic time, place and situation. Asks/answers questions appropriately. Affect is fatigued but engaged when able, limited by fatigue               Data Reviewed:     Reviewed recent pertinent imaging, comments:   CXR 7/20/2021   Increased haziness of right lower lung field. Possible  effusion and/or pneumonia versus atelectasis. Recommend follow-up to  Clearing.    CT abd/pelv 7/18/2021  1. Continued sequelae of necrotizing pancreatitis with the 15 x 6 cm  peripancreatic walled off necrosis predominantly involving the lesser  sac with drain in place. Enhancement of the residual viable pancreatic  tissue in the uncinate, body, head, and the majority of the tail.  2. Poor parenchymal enhancement of the kidneys suggesting ATN.  3. Moderate amount of ascites with diffuse peritoneal enhancement and  dependant debris in the pelvis. Peritonitis cannot be ruled out.  4. Right pleural effusion with associated compressive atelectasis.  Trace left pleural effusion.    Reviewed recent labs, comments:   Na 126  K 5.4  Cr 4.22  Tbili 6.3  ALT 16  AST 50  WBC 24.3  Hgb 7.0    INR 2.75

## 2021-07-20 NOTE — PROGRESS NOTES
GASTROENTEROLOGY PROGRESS NOTE    Date of Admission: 6/28/2021  Reason for Admission: pancreatitis/hepatitis      ASSESSMENT/RECOMMENDATIONS:  30 year old male with a history of heavy alcohol use who presented to OSH 5/19 for abdominal pain, nausea and vomiting, admitted for alcohol hepatitis, ESLD, RADHA on HD, septic shock requiring pressors as well as necrotizing pancreatitis with large evolving necrotic collection. Transferred to [facility] for consideration of drainage of presumed infected necrotic collection.     #. Acute necrotizing pancreatitis with presumed infected walled off necrotic collection s/p percutaneous drainage  #. Septic shock, resolved  #. Leukocytosis  #. Ascites/Peritonitis  Unclear evolution of pancreatitis history but had a recent CT scan with large evolving necrotic collection, presumed infected. No obvious gas in collection. CT on admission to [facility] showing mature collection with enhancing wall measuring [id number]. With profound coagulopathy (liver disease + malnutrition +sepsis), INR 3.8 on transfer he was given FFP and Vit K, IR placed perc drain 6/29. Repeat imaging 7/18 with still large walled off necrotic collection that does appear amenable to endoscopic transluminal drainage however patient is weary of additional procedures that may increase his abdominal pain. Would recommend ongoing discussions with the patient regarding goals of care.  Etiology: alcohol  Date of onset: 5/19  BISAP score on admission: unclear  Concurrent organ failure: respiratory (now extubated), renal (on HD), liver, cardiogenic (weaned off pressors 6/29)  Thromboses: none  Diabetes: no  Current nutrition access: PEG-J  Last imaging: CT scan with IV contrast 7/18  Infection/antiinfectives: (Enterococcus faecium VRE from L RP flank drain 7/14)                - Meropenem (6/29 - 7/18)     - Ciprofloxacin (7/19 - present)                - Vancomycin (6/29 - 6/30)     - Micafungin (6/29 - 7/1)   Intervention:    6/30  "- Left IR RP perc drain (24F)    7/2 - Diagnostic paracentesis (WBC 2663 - 74% PMN, SAAG <1.1, protein 4.1)     7/5 - Repeat Paracentesis (WBC 1801 - 61% PMN, lipase 48)    7/13 - Diag/therapeutic paracentesis ()    7/13 - PEG-J placement with T-tag gastropexy    -Plan for EUS guided cystgastrostomy tomorrow in OR (patient agreeable, risks discussed)  -Please give 10mg IV Vit K today  -Check INR, type and screen tmrw AM as well has have FFP available for procedure tomorrow  -Drain management per IR - have asked them to pull drain back slightly during procedure so drain doesn't interfere with endoscopic placement of stent  -PRN paracentesis  -Antibiotics per ID  -Pain meds per primary team    #. Severe malnutrition in the context of chronic illness  #. S/p PEG-J placement  Patient with very poor oral intake over the course of his illness and was not meeting calorie requirements orally. S/p PEG-J with T-tag gastropexy (x3) 7/13.  -Advance tube feeds as tolerated  -Please start PERT (defer to dietitian for dosing)  -Oral diet as tolerated  -T-tags (white buttons) should be removed 2 weeks following procedure (~7/27) by lifting the button and cutting the underlying blue suture. Often times the buttons fall off prior to the 2 week louis.     #. Alcohol hepatitis  #. Hyperbilirubinemia  #. Coagulopathy  #. Encephalopathy  Known heavy alcohol use. Reportedly received course of steroids at OSH for 1 week with \"some improvement\". Currently has primarily hyperbilirubinemia and mildly elevated AST. Unclear if he has underlying cirrhosis but certainly would fit with alcohol hepatitis vs elevated liver tests of critical illness/sepsis. Not candidate for steroids right now. He is receiving lactulose and Xifaxin for encephalopathy but having a lot of stool output. Hepatology notified of admission, ongoing discussions with them. Not currently liver transplant candidate.  -Trend MELD labs  -Continue lactulose/Xifaxin and " "titrate to 3 soft stools per day -- please record stool output  -Alcohol abstinence    This patient has a very poor overall prognosis and agree with palliative care consultation and GOC discussions with the family    The patient was discussed and plan agreed upon with GI staff, Dr Ginna Norwood, PA-C  Advanced Endoscopy/Pancreaticobiliary GI Service  [facility]  Text Page  _______________________________________________________________      Subjective: Nursing notes and 24hr events reviewed. Patient seen and examined at 1430. Reports that he is very tired today after dialysis. Reports that he is in a lot of abdominal pain. No vomiting. Had a couple of bites of hamburger this afternoon.     ROS:   4 pt ROS negative unless noted in subjective.     Objective:  Blood pressure 122/54, pulse 120, temperature 98.4  F (36.9  C), temperature source Oral, resp. rate 14, height 1.676 m (5' 6\"), weight 78.5 kg (173 lb 1 oz), SpO2 94 %.  Gen: Laying in bed. Appears comfortable, sleeping  HEENT: NCAT. Conjunctiva clear. Sclera ++icteric, poor dentition  CV: Peripheral perfusion intact  Resp: non labored breathing  Abd: moderate distention, minimal dark brown output in RP perc drain, GJ tube with dried blood but patient would not let me touch his abdomen  Msk: no gross deformity  Skin:  ++ jaundice  Ext: warm, well perfused  Mental status/Psych: alert and oriented, flat affect, somnolent    LABS:  BMP  Recent Labs   Lab 07/20/21  0601 07/19/21  0632 07/18/21  0637 07/17/21  2031   * 127* 128* 127*   POTASSIUM 5.4* 5.1 5.6* 5.1   CHLORIDE 94 96 97 97   JANENE 8.6 8.6 8.4* 8.3*   CO2 19* 21 20 20   BUN 62* 50* 68* 57*   CR 4.22* 3.47* 4.46* 4.08*   * 83 164* 142*     CBC  Recent Labs   Lab 07/20/21  0601 07/19/21  0632 07/18/21  0637 07/17/21  0506   WBC 24.3* 21.0* 25.2* 27.3*   RBC 2.24* 2.26* 2.37* 2.29*   HGB 7.0* 7.0* 7.5* 7.3*   HCT 21.2* 21.7* 22.6* 22.3*   MCV 95 " 96 95 97   MCH 31.3 31.0 31.6 31.9   MCHC 33.0 32.3 33.2 32.7   RDW 18.6* 19.7* 19.9* 20.7*    251 251 225     INR  Recent Labs   Lab 07/20/21  0601 07/19/21  0632 07/18/21  0637 07/17/21  0506   INR 1.75* 1.80* 1.74* 1.80*     LFTs  Recent Labs   Lab 07/20/21  0601 07/19/21  0632 07/18/21  0637 07/17/21  0506   ALKPHOS 180* 190* 242* 175*   AST 50* 63* 128* 87*   ALT 16 19 29 20   BILITOTAL 6.3* 6.3* 7.4* 7.5*   PROTTOTAL 7.1 6.7* 7.3 7.0   ALBUMIN 2.1* 2.1* 2.2* 2.3*      PANC  No lab results found in last 7 days.      IMAGING:  EXAMINATION: CT ABDOMEN PELVIS W CONTRAST, 7/18/2021 11:29 AM     TECHNIQUE:  Helical CT images from the lung bases through the  symphysis pubis were obtained with IV contrast. Contrast: 119 cc  [id number]      COMPARISON: 7/11/2021, 6/30/2021     HISTORY: Abdominal abscess/infection suspected     FINDINGS:     LUNG BASES: Normal heart size, trace pericardial effusion. Moderate  right pleural effusion with associated compressive atelectasis. Tiny  left basilar pleural effusion with associated compressive atelectasis.     ABDOMEN/PELVIS: Percutaneous gastrojejunostomy with tip in the  proximal jejunum. Unremarkable hepatic parenchyma. Ascites along the  inferior right hepatic lobe, extending into the pelvis and the left  paracolic gutter. Mild mural hyperemia of the gallbladder with mild  associated gallbladder thickening. Sequela of necrotizing pancreatitis  with associated 15 x 6 cm peripancreatic walled off necrosis with  drain in place. Overall, there is improved mesenteric stranding  surrounding the necrotic collection. There is viable enhancement of  the uncinate process and pancreatic head as well as distal body and  tail. Spleen measures 16 cm. There is extensive reactive appearing  lymphadenopathy throughout the mesentery and retroperitoneum. There is  poor contrast uptake throughout the renal parenchyma. Visualized  ureters are normal. Normal caliber, edematous loops of  small bowel.  Predominantly fluid-filled colon. Appendix is well visualized. Major  abdominal vasculature is patent. No concerning pelvic mass.     BONES: No acute or suspicious osseous abnormality.                                                                      IMPRESSION:  1. Continued sequelae of necrotizing pancreatitis with the 15 x 6 cm  peripancreatic walled off necrosis predominantly involving the lesser  sac with drain in place. Enhancement of the residual viable pancreatic  tissue in the uncinate, body, head, and the majority of the tail.  2. Poor parenchymal enhancement of the kidneys suggesting ATN.  3. Moderate amount of ascites with diffuse peritoneal enhancement and  dependant debris in the pelvis. Peritonitis cannot be ruled out.  4. Right pleural effusion with associated compressive atelectasis.  Trace left pleural effusion.

## 2021-07-20 NOTE — PROGRESS NOTES
[facility] PROGRESS NOTE     Patient:  Dax Villareal   Date of birth 1990, Medical record number [id number]  Date of Visit:  07/20/2021  Date of Admission: 6/28/2021  Consult Requester:Patricia Baker, *          Assessment and Plan:   RECOMMENDATIONS:  1. Continue to monitor off of meropenem.  2. SBP prophylaxis with Cipro  3. Collect cell count w/diff, and culture if paracentesis is preformed.       ASSESSMENT:  Dax Villareal is a 30 year old male with PMHx significant for alcohol use disorder who was admitted to [facility] in [place], [place] for abdominal pain, nausea and vomiting, found to have end stage liver disease c/b hepatic encephalopathy, acute renal failure requiring iHD, SBP and acute necrotizing pancreatitis.      Afebrile on admission, however has spiked some low grade intermittent fevers up to 100.9 on 7/1-2 then defervesced. WBC significantly elevated on admission to 50K with slow downtrend over the past few weeks, currently 17K. Lactate intermittently elevated into the 2-3 range, otherwise wnl. Infectious work up since admission to [facility] has remained negative. BCx negative. Drain placement pancreatic cultures collected 6/30 NGTD/negative. 7/2 Ascites fluid cultures negative, labs included WBC 2663/74% neutrophils. Repeat ascites fluid cultures 7/5 with 1801 WBC/61% neutrophils, cultures negative. 7/13 ascites fluid cultures NGTD. See imaging section below for review.      Started on meropenem (6/29-7/18), micafungin (6/29-7/1), and given 1x dose of IV Vancomycin (6/29). On Rifaximin. He has now completed 4-5 days following replacement of perc drain for a total of 21 days of broad spectrum antibiotics s/p initial perc drain placement for necrotizing pancreatitis with pseudocyst. Cultures remain largely negative with exception of VRE (see below). As such transitioned to Cipro for SBP prophylaxis and monitoring off of broad abx.     Of note, Abscess culture  collected 7/14 from his re-positioned drain with growth of VRE. When the culture was collected he was clinically stable and there was no evidence of a new infection with improvement in WBC count noted on serial ascites studies. As such, elected not to treat VRE as it was felt to most likely be colonization having been selected with prolonged meropenem coverage.      Thank you for this consult. ID will continue to follow.      Rosa Elena Horan PA-C  Pronouns: she/her/hers  Infectious Diseases  Pager: [phone]  07/20/2021           Interim History and Events:   Afebrile. Placed on 3L oxymask overnight for comfort, no hypoxia recorded. Down to HD this AM. Dax reports increased work of breathing. Reports continued abdominal pain. Unsure whether it is the same or worse compared to yesterday.          HPI:   Dax Villareal is a 30 year old male with PMHx significant for alcohol use disorder who was admitted to [facility] in [place], [place] for abdominal pain, nausea and vomiting, found to have end stage liver disease c/b hepatic encephalopathy, acute renal failure requiring iHD, SBP and acute necrotizing pancreatitis.      He required respiratory support (BIPAP, ? Intubation) for respiratory failure and appeared to be in sepsis (lactate reported at 7.7 with abnormal LFTs). He was transferred to [facility] for prolonged admission and reportedly treated with steroids; kidney function and pancreatic enzymes normal on admission). Length of stay stated >40 days. CT imaging 6/19 with concern for necrotizing pancreatitis with follow up CT scan 6/25 with peripancreatic fluid collection and 9 cm soft tissue/hemorrhagic structure c/f internal hemorrhage of a pseudocyst. Given multiple antibiotics (Cefepime, Flagyl, Ciprofloxacin, micafungin). Hospital course reportedly complicated by hypoxemic respiratory failure requiring BIPAP, ARF requiring iHD, hepatic encephalopathy, and septic shock 2/2 necrotizing  "pancreatitis/pancreatic pseudocyst requiring pressor support. Ultimately transferred to [facility] for interventions 6/28. Admitted to the ICU.      Now status post perc IR drain placement 6/29 with cultures that remain NGTD/negative. No endoscopic drainage by GI was pursued given improvement in imaging 7/11. Additionally has had 3 paracenteses (7/2, 7/5, and 7/13 with negative culture work up. Had PEGJ placed 7/13.      He is an outdoorsman. Picks mushrooms, goes fishing/hunting, and hiking. Has not been since \"last spring\". Denies feeling ill prior to admission to OSH. Feeling fairly well currently although notes diffuse abdominal pain. Has had multiple procedures within the past few days and states he feels like he needs a \"rest day\". Denies fever/chills. No URI sx. Is having diarrhea, but on lactulose. Anuric.          ROS:   -Focused 4 point ROS completed, pertinent positives and negatives listed above.    Physical Examination:  Temp: 98  F (36.7  C) Temp src: Oral BP: 113/63 Pulse: 115   Resp: 12 SpO2: 99 % O2 Device: Oxymask Oxygen Delivery: 3 LPM    Vitals:    07/16/21 0546 07/17/21 0500 07/18/21 0500 07/18/21 1430   Weight: 74 kg (163 lb 2.3 oz) 78.2 kg (172 lb 6.4 oz) 78.5 kg (173 lb 1 oz) 77.5 kg (170 lb 13.7 oz)    07/20/21 0800   Weight: 78.5 kg (173 lb 1 oz)       Constitutional: Adult male patient seen lying in bed, in NAD. Sleeping but wakes to voice.   HEENT: NCAT, icterus, conjunctiva clear. Moist mucous membranes.  CV: tachycardic, regular rhythm, no murmur noted.  Respiratory: Non-labored breathing on RA, CTAB.  Skin: Warm and dry. Jaundiced. No rashes, scattered petechiae on torso.  Musculoskeletal: Extremities grossly normal. No tenderness or edema present.   Neurologic: A &O x3, speech normal, answering questions appropriately. Moves all extremities spontaneously. Grossly non-focal.  Neuropsychiatric: Mentation and affect normal/appropriate, somewhat flat.  VAD: CVC is c/d/i with no erythema, " drainage, or tenderness.      Medications:    amylase-lipase-protease  2 capsule Oral TID w/meals     B and C vitamin Complex with folic acid  5 mL Per Feeding Tube Daily     ciprofloxacin  250 mg Oral Daily     folic acid  1 mg Oral Daily    Or     folic acid  1 mg Intravenous Daily     HYDROmorphone (STANDARD CONC)  2 mg Oral Once     lactulose  20 g Oral or NG Tube 4x Daily     melatonin  6 mg Oral At Bedtime     pantoprazole  40 mg Oral QAM AC     [Held by provider] potassium chloride  20 mEq Oral BID     rifaximin  550 mg Oral BID     sodium chloride (PF)  10 mL Irrigation Q8H     sodium chloride (PF)  9 mL Intracatheter During Dialysis/CRRT (from stock)     sodium chloride (PF)  9 mL Intracatheter During Dialysis/CRRT (from stock)     sodium chloride (PF)  9 mL Intracatheter During Dialysis/CRRT (from stock)     vitamin B1  100 mg Oral Daily    Or     thiamine  100 mg Intravenous Daily       Infusions/Drips:    - MEDICATION INSTRUCTIONS -       dextrose         Laboratory Data:   Inflammatory Markers    Recent Labs   Lab Test 10/06/20  0927   CRP 16.9*       Metabolic Studies       Recent Labs   Lab Test 07/20/21  0601 07/19/21  1314 07/19/21  0632 07/18/21  1141 07/18/21  0637 07/17/21  2031 07/17/21  0506 07/16/21  0616 07/15/21  0811 07/15/21  0443 07/14/21  1945 07/14/21  0345   *  --  127*  --  128* 127* 130* 130*  130*  --  131*  --  130*   POTASSIUM 5.4*  --  5.1  --  5.6* 5.1 5.0 4.0  --  3.3*  --  4.2   CHLORIDE 94  --  96  --  97 97 99 99  --  98  --  98   CO2 19*  --  21  --  20 20 20 20  --  23  --  20   ANIONGAP 13  --  10  --  11 10 11 11  --  10  --  12   BUN 62*  --  50*  --  68* 57* 51* 39*  --  26  --  42*   CR 4.22*  --  3.47*  --  4.46* 4.08* 3.77* 3.23*  --  2.30*  --  3.35*   GFRESTIMATED 18*  --  22*  --  16* 18* 20* 24*  --  36*  --  23*   *  --  83  --  164* 142* 136* 151*  --  131*   < > 137*   JANENE 8.6  --  8.6  --  8.4* 8.3* 7.9* 7.8*  --  8.1*  --  8.6   PHOS  --   --    --   --   --   --   --  2.0*  --  3.0  --  4.6*   MAG  --   --   --   --   --   --   --   --   --  1.6  --  1.6   LACT  --  1.6  --  1.6  --   --   --   --    < >  --   --   --     < > = values in this interval not displayed.       Hepatic Studies    Recent Labs   Lab Test 07/20/21  0601 07/19/21  0632 07/18/21  0637 07/17/21  0506 07/15/21  0443 07/14/21  0345   BILITOTAL 6.3* 6.3* 7.4* 7.5* 9.3* 11.1*   ALKPHOS 180* 190* 242* 175* 182* 174*   ALBUMIN 2.1* 2.1* 2.2* 2.3* 2.7* 2.6*   AST 50* 63* 128* 87* 86* 54*   ALT 16 19 29 20 16 12       Pancreatitis testing    Recent Labs   Lab Test 06/29/21  0410 06/28/21  1839 05/18/21  1220 12/09/20  1504 10/06/20  0927   LIPASE  --  199 381 140 139   TRIG Unsatisfactory specimen - icteric Canceled, Test credited  --   --   --        Hematology Studies      Recent Labs   Lab Test 07/20/21  0601 07/19/21  0632 07/18/21  0637 07/17/21  0506 07/16/21  0616 07/15/21  0443 06/29/21  0410 06/28/21  1839 05/18/21  1220 12/09/20  1504 10/06/20  0927   WBC 24.3* 21.0* 25.2* 27.3* 26.7* 20.3*   < > 50.6* 13.2* 4.2 6.9   ANEU  --   --   --   --   --   --   --  44.0* 12.0* 2.1 5.2   ALYM  --   --   --   --   --   --   --  0.0* 0.6* 1.3 0.8   MEDHAT  --   --   --   --   --   --   --  2.0* 0.4 0.6 0.8   AEOS  --   --   --   --   --   --   --  0.5 0.0 0.2 0.1   HGB 7.0* 7.0* 7.5* 7.3* 7.5* 8.0*   < > 7.6* 16.6 17.2 18.8*   HCT 21.2* 21.7* 22.6* 22.3* 23.0* 24.2*   < > 23.4* 44.8 47.7 51.2    251 251 225 231 219   < > 127* 222 299 311    < > = values in this interval not displayed.       Arterial Blood Gas Testing    Recent Labs   Lab Test 07/01/21  0014 06/30/21  1716 06/28/21  1839   O2PER 60.0 5L 2L        Urine Studies     Recent Labs   Lab Test 12/09/20  1817 10/06/20  1214   URINEPH 7.0 7.0   NITRITE Negative Negative   LEUKEST Negative Negative   WBCU 0 1       Vancomycin Levels     Recent Labs   Lab Test 06/30/21  0700   VANCOMYCIN 31.2*       Microbiology:  Culture Micro    Date Value Ref Range Status   2021 No growth  Final   2021 Culture negative after 2 weeks  Preliminary   2021 No growth  Final   2021 No growth  Final   2021 No growth  Final   2021 No growth  Final   2021 No anaerobes isolated  Final   2021 No growth  Final   2021 No growth  Final   2021 No growth after 6 days  Final   2021 No growth after 6 days  Final       Last check of C difficile  C Difficile Toxin B by PCR   Date Value Ref Range Status   2021 Negative Negative Final     Comment:     A negative result does not exclude actual disease due to C. difficile and may be due to improper collection, handling and storage of the specimen or the number of organisms in the specimen is below the detection limit of the assay.       Imagin/19 CXR  IMPRESSION: Increased haziness of right lower lung field. Possible  effusion and/or pneumonia versus atelectasis. Recommend follow-up to  Clearing.     CXR  IMPRESSION: Unchanged mixed interstitial and airspace opacities most  prominent in the perihilar and bibasilar regions.  1.  Stable support equipment.     CT AP w/o contrast   IMPRESSION:   1. Sequela of necrotizing pancreatitis with highly decreased size of  walled off necrosis centered in the lesser sac. Percutaneous drain  remains in place.  2. Loculated moderate ascites is unchanged.  3. Hepatomegaly, hepatic steatosis, and evidence of portal  hypertension again seen.  4. Small bilateral pleural effusions are slightly improved.      CT AP w/o contrast   IMPRESSION:   1. Status post placement of a left lateral approach drainage catheter,  substantially decreased but persistent hemorrhagic walled off necrosis  centered in the lesser sac. A smaller collection along the inferior  aspect of the third portion of the duodenum is minimally changed.  2. Minimally changed moderate to large volume loculated ascites.  3. Hepatomegaly and hepatic  steatosis with evidence of cirrhosis and  portal hypertension.  4. Diffuse edematous wall thickening of the small and large bowel,  likely reactive. No bowel obstruction.  5. Small right and trace left pleural effusions with adjacent  compressive atelectasis, similar to prior. Small pericardial effusion.  6. Faint patchy ground glass opacities in the right middle lobe and  and right upper lobe, likely infectious/inflammatory.     6/29 CT AP w/ contrast   IMPRESSION:   1.  Sequela of necrotizing pancreatitis with peripancreatic necrotic  fluid collection, large volume abdominal ascites and mesenteric edema.  2.  Thickened, edematous small bowel wall is likely due to reactive  enteritis secondary to pancreatitis.  3.  Hepatomegaly with steatosis and mild surface nodularity,  associated with splenomegaly and recanalized paraumbilical vein,  suggestive of cirrhosis with portal hypertension.  4.  Enlarged hypoenhancing kidneys with loss of corticomedullary  differentiation, suggestive of acute renal parenchymal disease. No  hydronephrosis or perinephric fat stranding.  5.  Small right and trace left pleural effusions, interlobular septal  thickening, and diffuse anasarca, likely related to fluid third  spacing.

## 2021-07-20 NOTE — PROGRESS NOTES
HEMODIALYSIS TREATMENT NOTE    Date: 7/20/2021  Time: 3:06 PM    Data:  Pre Wt: 78.5 kg (173 lb 1 oz)   Desired Wt: 76.5 kg   Ultrafiltration - Post Run Net Total Removed (mL): 2000 mL  Vascular Access Status: RIJ TUnneled CVC Dual Lumens: patent  Dialyzer Rinse: Streaked, Light  Total Blood Volume Processed: 73.15 L   Total Dialysis (Treatment) Time: 3.5 hrs  Dialysate Bath: K 2, Ca 2.5, Na bath : 138. Bicarb: 32  Heparin: None    Lab:   No    Assessment:  Patent RIJ Tunneled Dual Lumens.  Pre Run K/Ca: 5.4/ 8.6, BUN/Cr: 62/ 4.22, Hgb/Hct: 7.0/ 21.2  HB S Ag and AB: (-)/ 329.32 at 6-    Interventions:  Patient dialyzed for 3hrs 30 mins via RIJ Tunneled CVC Dual lumens.Initiated HD with BFR at 400 ml/mins but reduced BFR to 350 ml /m with high AP alarms. Stable V/S except tachycardia and tolerable for 2 kg off. Finished HD with rinse back, CVC NS locked then applied clear guards caps. Gave New CVC dressing with bio-patch .     Plan:    Next run per renal team.

## 2021-07-20 NOTE — PLAN OF CARE
Neuro: AxOx4, follows commands, moves ext. Pt slept well overnight. PERRLA 3 Brisk  Resp: 95-97% RA. Coarse crackles RUL. Patient no longer having coughing fits.   Cardiac: 's. -120's. Afebrile  GI/: Anuria related to HD. 8 BM overnight after receiving lactulose. Tube feed held overnight. Free water flush 30ml/4hr.   Pain: Continued abdominal pain 7/10, receiving dilaudid 2mg Q3. Pt continues to refuse dressing changes or cleaning of Jtube/Gtube site due to pain.   Skin: Coccyx/groin reddened, barrier cream applied. No new deficits noted  LDA's: Left arm PICC triple lumen. Gtube to gravity. Jtube clamped. Abd drain with 50cc brown output, Q8 NS flushes.      Plan: Dialysis today. Continue with POC, notify primary team with changes.

## 2021-07-20 NOTE — CONSULTS
"    Interventional Radiology Consult Service Note    Patient is on IR schedule 7/21 for a dx/therapeutic paracentesis.   Labs WNL for procedure. COVID neg.    No NPO required.  Consent will be done prior to procedure.     Please contact the IR charge RN at [phone] for estimated time of procedure.     Case discussed with Dr. Cleveland. This is a 30 year old male with hx of alcohol use disorder admitted on 6/28/2021 after recent prolonged admission at [facility] for acute alcoholic hepatitis who has ESLD c/b HE, ARF requiring HD, and SBP and malnutrition in the setting of acute necrotizing pancreatitis, s/p PEG-J placement on 7/13. He arrived with acute respiratory failure and septic shock that have resolved, and his SBP has been appropriately treated. He requires continued management for ARF, hepatic encephalopathy, infected pancreatic pseudocyst, and nutritional support via PEG-J. Pt with abdominal pain. CT scan shows loculated moderate ascites in additional to necrotic collections currently being drained. IR is consulted for therapeutic paracentesis. CAPS deferred due to loculated nature of fluid. Discussed that we will target largest loculation but cannot guarantee significant therapeutic benefit.     Dx labs have been entered by Dr. Cleveland.     *After completion of this consult, IR was also asked by GI to come to the OR at 1120 to pull back and resuture left RP drain. GI plans to place internal stents and is concerned the pigtail of our drain will be in their way. Pt scheduled for both procedures with IR. Para likely to be done during separate procedure. (Consent has been completed)    Expected date of discharge: TBD    Vitals:   /54 (BP Location: Right arm)   Pulse 120   Temp 98.4  F (36.9  C) (Oral)   Resp 14   Ht 1.676 m (5' 6\")   Wt 78.5 kg (173 lb 1 oz)   SpO2 94%   BMI 27.93 kg/m      Pertinent Labs:     Lab Results   Component Value Date    WBC 24.3 (H) 07/20/2021    WBC 21.0 (H) " 07/19/2021    WBC 25.2 (H) 07/18/2021    WBC 25.8 (H) 07/11/2021    WBC 23.6 (H) 07/10/2021    WBC 24.7 (H) 07/09/2021       Lab Results   Component Value Date    HGB 7.0 07/20/2021    HGB 7.0 07/19/2021    HGB 7.5 07/18/2021    HGB 7.5 07/11/2021    HGB 7.2 07/10/2021    HGB 6.4 07/10/2021       Lab Results   Component Value Date     07/20/2021     07/19/2021     07/18/2021     07/11/2021     07/10/2021     07/09/2021       Lab Results   Component Value Date    INR 1.75 (H) 07/20/2021    INR 1.91 (H) 07/11/2021    PTT 97 (H) 06/28/2021       Lab Results   Component Value Date    POTASSIUM 5.4 (H) 07/20/2021    POTASSIUM 4.6 07/11/2021        SOFIA Zhang CNP  Interventional Radiology  Pager: [phone]

## 2021-07-20 NOTE — PROGRESS NOTES
"BP 97/46 (BP Location: Right arm)   Pulse 120   Temp 97.9  F (36.6  C) (Oral)   Resp 16   Ht 1.676 m (5' 6\")   Wt 78.5 kg (173 lb 1 oz)   SpO2 94%   BMI 27.93 kg/m     Neuro: A&Ox4.   Cardiac: Afebrile, VSS.   Respiratory: RA , PRN O2 when anxious.  GI/: on HD. No BM this shift.   Diet/appetite:Tolerating diet. Refuses TFs. 2L FR.Denies nausea   Activity: A-2 , declined to reposition.  Pain:Denies   Skin: No new deficits noted.  Lines:PICC, CVC  Drains: PEG, Left-open drain  No new complaints.Will continue to monitor and follow plan of care.       "

## 2021-07-20 NOTE — PROGRESS NOTES
Nephrology Progress Note  07/20/2021              Dax Villareal is a 30 year old male with h/o ETOH hepatitis, end stage liver disease, RADHA on HD, transferred from [facility] in [place] for septic shock on 6/28/21 for treatment of necrotizing pancreatitis and decompensated liver disease. He was initially admitted to [facility] 5/19/21.        Interval History :   Mr Villareal was ordered for run yesterday but did not feel well enough to run, planned for run today as K is up, Na is down.  Had CT yesterday to eval abd pain, has ongoing pancreatitis with drain in place.  Will evaluate for extra run tomorrow, on fluid restriction.        Assessment & Recommendations:   RADHA-Cr was 0.8 as recently as 5/19/2021 but now HD dependent, started RRT at [facility] prior to transfer to [facility] (exact date unclear but per family it was end of May).  Etiology likely multifactorial with contrast, sepsis/pancreatitis, likely HRS physiology contributing as well.  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  Continuing RRT as long as it is tolerated with regards to BP's, is functional enough to possibly do outpt HD.  MELD is in high 30's so has high 3 month mortality but continuing restorative cares for now.                -Line is TDC from PTA               -HD this am, 1-2L of UF, K up to 5.4 and Na low, on fluid restriction.       Volume status-Anuric, has abdominal distension and some mild peripheral edema, planning 2L of UF with run today.       Electrolytes/pH-K 5.4 and Na 126 after he refused run yesterday, ordered fluid restriction for hyponatremia.        Ca/phos/pth-Ca 8.6, corrects to normal with low albumin.       Anemia-Hgb 7.0, acute management per team, last PRBC's 7/14.       Nutrition-Taking PO, appetite ok.       Discussed with Dr Ryan      Recommendations were communicated to primary team via verbal communication.        Tor Larsen, SOFIA CNS  Clinical Nurse Specialist  [phone]      Review of Systems:   I  "reviewed the following systems:  Gen: No fevers or chills  CV: No CP at rest  Resp: No SOB at rest  GI: No N/V       Physical Exam:   I/O last 3 completed shifts:  In: 1100 [P.O.:440; Other:60; NG/GT:180]  Out: 25 [Drains:25]   /56   Pulse 117   Temp 98  F (36.7  C) (Oral)   Resp 15   Ht 1.676 m (5' 6\")   Wt 78.5 kg (173 lb 1 oz)   SpO2 99%   BMI 27.93 kg/m       GENERAL APPEARANCE: Mild distress, + abd pain.    EYES:  scleral icterus, pupils equal  Pulmonary: lungs clear to auscultation. Off supplemental oxygen   CV: tachy   - Edema - no LE edema  GI: large distended, tender. Has retroperitoneal drain  MS: no evidence of inflammation in joints, no muscle tenderness  SKIN: no rash, warm, dry, no cyanosis  NEURO: alert/interactive  ACCESS: Right TDC     Labs:   All labs reviewed by me  Electrolytes/Renal - Recent Labs   Lab Test 07/20/21  0601 07/19/21  0632 07/18/21  0637 07/16/21  0616 07/15/21  0443 07/14/21  0345 07/14/21  0345 07/13/21  0718   * 127* 128* 130*  130* 131*  --  130* 131*   POTASSIUM 5.4* 5.1 5.6* 4.0 3.3*  --  4.2 3.6   CHLORIDE 94 96 97 99 98  --  98 98   CO2 19* 21 20 20 23  --  20 23   BUN 62* 50* 68* 39* 26  --  42* 29   CR 4.22* 3.47* 4.46* 3.23* 2.30*  --  3.35* 2.60*   * 83 164* 151* 131*   < > 137* 94   JANENE 8.6 8.6 8.4* 7.8* 8.1*  --  8.6 8.5   MAG  --   --   --   --  1.6  --  1.6 1.5*   PHOS  --   --   --  2.0* 3.0  --  4.6* 4.3    < > = values in this interval not displayed.       CBC -   Recent Labs   Lab Test 07/20/21  0601 07/19/21  0632 07/18/21  0637   WBC 24.3* 21.0* 25.2*   HGB 7.0* 7.0* 7.5*    251 251       LFTs -   Recent Labs   Lab Test 07/20/21  0601 07/19/21  0632 07/18/21  0637   ALKPHOS 180* 190* 242*   BILITOTAL 6.3* 6.3* 7.4*   ALT 16 19 29   AST 50* 63* 128*   PROTTOTAL 7.1 6.7* 7.3   ALBUMIN 2.1* 2.1* 2.2*       Iron Panel -   Recent Labs   Lab Test 07/19/21  0632   IRON 31*   IRONSAT 43           Current Medications:    " amylase-lipase-protease  2 capsule Oral TID w/meals     B and C vitamin Complex with folic acid  5 mL Per Feeding Tube Daily     ciprofloxacin  250 mg Oral Daily     folic acid  1 mg Oral Daily    Or     folic acid  1 mg Intravenous Daily     HYDROmorphone (STANDARD CONC)  2 mg Oral Once     lactulose  20 g Oral or NG Tube 4x Daily     melatonin  6 mg Oral At Bedtime     pantoprazole  40 mg Oral QAM AC     [Held by provider] potassium chloride  20 mEq Oral BID     rifaximin  550 mg Oral BID     sodium chloride (PF)  10 mL Irrigation Q8H     sodium chloride (PF)  9 mL Intracatheter During Dialysis/CRRT (from stock)     sodium chloride (PF)  9 mL Intracatheter During Dialysis/CRRT (from stock)     sodium chloride (PF)  9 mL Intracatheter During Dialysis/CRRT (from stock)     vitamin B1  100 mg Oral Daily    Or     thiamine  100 mg Intravenous Daily       - MEDICATION INSTRUCTIONS -       dextrose

## 2021-07-20 NOTE — PLAN OF CARE
"Neuro: A&Ox4, anxious   Cardiac: ST. /54 (BP Location: Right arm)   Pulse 120   Temp 98.4  F (36.9  C) (Oral)   Resp 14   Ht 1.676 m (5' 6\")   Wt 78.5 kg (173 lb 1 oz)   SpO2 94%   BMI 27.93 kg/m      Respiratory: Sating 92-95 on RA.  GI/: No urine output, on Hemodialysis. Dialysis this AM 2L off. No BM overshift multiple loose stools in early AM  Diet/appetite: Tolerating 2g Na+ diet and 2L Fluid restriction. Poor appetite, Pt refused tube feeds today  Activity:  Assist of x2 turning in bed, refusing repositioning intermittently, pulsate mattress  Pain: Dilaudid PO q3hr for abdominal pain   Skin: Erythema on bottom and groin from loose stools, jaundiced, generalized bruising  LDA's: 3 lumen PICC SL'd     Plan: Continue with POC. Notify primary team with changes.   "

## 2021-07-21 ENCOUNTER — ANESTHESIA EVENT (OUTPATIENT)
Dept: SURGERY | Facility: CLINIC | Age: 31
End: 2021-07-21
Payer: MEDICAID

## 2021-07-21 ENCOUNTER — APPOINTMENT (OUTPATIENT)
Dept: INTERVENTIONAL RADIOLOGY/VASCULAR | Facility: CLINIC | Age: 31
End: 2021-07-21
Attending: NURSE PRACTITIONER
Payer: MEDICAID

## 2021-07-21 ENCOUNTER — ANESTHESIA (OUTPATIENT)
Dept: SURGERY | Facility: CLINIC | Age: 31
End: 2021-07-21
Payer: MEDICAID

## 2021-07-21 ENCOUNTER — APPOINTMENT (OUTPATIENT)
Dept: GENERAL RADIOLOGY | Facility: CLINIC | Age: 31
End: 2021-07-21
Attending: INTERNAL MEDICINE
Payer: MEDICAID

## 2021-07-21 LAB
ALBUMIN FLD-MCNC: 1.8 G/DL
ALBUMIN SERPL-MCNC: 2.1 G/DL (ref 3.4–5)
ALP SERPL-CCNC: 175 U/L (ref 40–150)
ALT SERPL W P-5'-P-CCNC: 15 U/L (ref 0–70)
ANION GAP SERPL CALCULATED.3IONS-SCNC: 9 MMOL/L (ref 3–14)
APPEARANCE FLD: ABNORMAL
AST SERPL W P-5'-P-CCNC: 47 U/L (ref 0–45)
BACTERIA ABSC ANAEROBE+AEROBE CULT: NORMAL
BACTERIA BLD CULT: NO GROWTH
BACTERIA BLD CULT: NO GROWTH
BACTERIA FLD CULT: NO GROWTH
BILIRUB SERPL-MCNC: 5.5 MG/DL (ref 0.2–1.3)
BLD PROD TYP BPU: NORMAL
BLOOD COMPONENT TYPE: NORMAL
BUN SERPL-MCNC: 33 MG/DL (ref 7–30)
CALCIUM SERPL-MCNC: 8.3 MG/DL (ref 8.5–10.1)
CHLORIDE BLD-SCNC: 97 MMOL/L (ref 94–109)
CO2 SERPL-SCNC: 24 MMOL/L (ref 20–32)
CODING SYSTEM: NORMAL
COLOR FLD: YELLOW
CREAT SERPL-MCNC: 2.69 MG/DL (ref 0.66–1.25)
CROSSMATCH: NORMAL
ELASTASE PANC STL-MCNT: 162 UG/G
ERYTHROCYTE [DISTWIDTH] IN BLOOD BY AUTOMATED COUNT: 18.7 % (ref 10–15)
GFR SERPL CREATININE-BSD FRML MDRD: 30 ML/MIN/1.73M2
GLUCOSE BLD-MCNC: 110 MG/DL (ref 70–99)
GLUCOSE BLDC GLUCOMTR-MCNC: 108 MG/DL (ref 70–99)
GLUCOSE BLDC GLUCOMTR-MCNC: 98 MG/DL (ref 70–99)
GRAM STAIN RESULT: NORMAL
GRAM STAIN RESULT: NORMAL
HCT VFR BLD AUTO: 20.5 % (ref 40–53)
HGB BLD-MCNC: 6.6 G/DL (ref 13.3–17.7)
HGB BLD-MCNC: 8.7 G/DL (ref 13.3–17.7)
INR PPP: 1.67 (ref 0.85–1.15)
INR PPP: 1.75 (ref 0.85–1.15)
ISSUE DATE AND TIME: NORMAL
LYMPHOCYTES NFR FLD MANUAL: 17 %
MCH RBC QN AUTO: 31 PG (ref 26.5–33)
MCHC RBC AUTO-ENTMCNC: 32.2 G/DL (ref 31.5–36.5)
MCV RBC AUTO: 96 FL (ref 78–100)
MONOS+MACROS NFR FLD MANUAL: 12 %
NEUTS BAND NFR FLD MANUAL: 71 %
PLATELET # BLD AUTO: 250 10E3/UL (ref 150–450)
POTASSIUM BLD-SCNC: 3.8 MMOL/L (ref 3.4–5.3)
PROT SERPL-MCNC: 6.7 G/DL (ref 6.8–8.8)
RBC # BLD AUTO: 2.13 10E6/UL (ref 4.4–5.9)
SODIUM SERPL-SCNC: 130 MMOL/L (ref 133–144)
UNIT ABO/RH: NORMAL
UNIT NUMBER: NORMAL
UNIT STATUS: NORMAL
UNIT TYPE ISBT: 7300
UPPER EUS: NORMAL
WBC # BLD AUTO: 21.7 10E3/UL (ref 4–11)
WBC # FLD AUTO: 453 /UL

## 2021-07-21 PROCEDURE — 36592 COLLECT BLOOD FROM PICC: CPT

## 2021-07-21 PROCEDURE — C1769 GUIDE WIRE: HCPCS | Performed by: INTERNAL MEDICINE

## 2021-07-21 PROCEDURE — 99207 PR NO CHARGE, DOC BY STUDENT: CPT | Performed by: HOSPITALIST

## 2021-07-21 PROCEDURE — 80053 COMPREHEN METABOLIC PANEL: CPT | Performed by: INTERNAL MEDICINE

## 2021-07-21 PROCEDURE — C1874 STENT, COATED/COV W/DEL SYS: HCPCS | Performed by: INTERNAL MEDICINE

## 2021-07-21 PROCEDURE — 85027 COMPLETE CBC AUTOMATED: CPT | Performed by: INTERNAL MEDICINE

## 2021-07-21 PROCEDURE — 86923 COMPATIBILITY TEST ELECTRIC: CPT | Performed by: INTERNAL MEDICINE

## 2021-07-21 PROCEDURE — 87205 SMEAR GRAM STAIN: CPT | Performed by: INTERNAL MEDICINE

## 2021-07-21 PROCEDURE — 0W9G3ZZ DRAINAGE OF PERITONEAL CAVITY, PERCUTANEOUS APPROACH: ICD-10-PCS | Performed by: RADIOLOGY

## 2021-07-21 PROCEDURE — 250N000009 HC RX 250: Performed by: INTERNAL MEDICINE

## 2021-07-21 PROCEDURE — 255N000002 HC RX 255 OP 636: Performed by: INTERNAL MEDICINE

## 2021-07-21 PROCEDURE — 85018 HEMOGLOBIN: CPT

## 2021-07-21 PROCEDURE — 250N000013 HC RX MED GY IP 250 OP 250 PS 637: Performed by: HOSPITALIST

## 2021-07-21 PROCEDURE — 999N000141 HC STATISTIC PRE-PROCEDURE NURSING ASSESSMENT: Performed by: INTERNAL MEDICINE

## 2021-07-21 PROCEDURE — 36592 COLLECT BLOOD FROM PICC: CPT | Performed by: STUDENT IN AN ORGANIZED HEALTH CARE EDUCATION/TRAINING PROGRAM

## 2021-07-21 PROCEDURE — 87070 CULTURE OTHR SPECIMN AEROBIC: CPT | Performed by: INTERNAL MEDICINE

## 2021-07-21 PROCEDURE — 272N000502 HC NEEDLE CR3

## 2021-07-21 PROCEDURE — 99233 SBSQ HOSP IP/OBS HIGH 50: CPT | Performed by: INTERNAL MEDICINE

## 2021-07-21 PROCEDURE — 89051 BODY FLUID CELL COUNT: CPT | Performed by: INTERNAL MEDICINE

## 2021-07-21 PROCEDURE — C1876 STENT, NON-COA/NON-COV W/DEL: HCPCS | Performed by: INTERNAL MEDICINE

## 2021-07-21 PROCEDURE — 49083 ABD PARACENTESIS W/IMAGING: CPT | Mod: GC | Performed by: RADIOLOGY

## 2021-07-21 PROCEDURE — 250N000009 HC RX 250: Performed by: NURSE ANESTHETIST, CERTIFIED REGISTERED

## 2021-07-21 PROCEDURE — 258N000003 HC RX IP 258 OP 636

## 2021-07-21 PROCEDURE — 710N000010 HC RECOVERY PHASE 1, LEVEL 2, PER MIN: Performed by: INTERNAL MEDICINE

## 2021-07-21 PROCEDURE — 258N000003 HC RX IP 258 OP 636: Performed by: NURSE ANESTHETIST, CERTIFIED REGISTERED

## 2021-07-21 PROCEDURE — 82042 OTHER SOURCE ALBUMIN QUAN EA: CPT | Performed by: INTERNAL MEDICINE

## 2021-07-21 PROCEDURE — P9059 PLASMA, FRZ BETWEEN 8-24HOUR: HCPCS | Performed by: INTERNAL MEDICINE

## 2021-07-21 PROCEDURE — 85610 PROTHROMBIN TIME: CPT | Performed by: STUDENT IN AN ORGANIZED HEALTH CARE EDUCATION/TRAINING PROGRAM

## 2021-07-21 PROCEDURE — 250N000013 HC RX MED GY IP 250 OP 250 PS 637: Performed by: INTERNAL MEDICINE

## 2021-07-21 PROCEDURE — 272N000001 HC OR GENERAL SUPPLY STERILE: Performed by: INTERNAL MEDICINE

## 2021-07-21 PROCEDURE — C1726 CATH, BAL DIL, NON-VASCULAR: HCPCS | Performed by: INTERNAL MEDICINE

## 2021-07-21 PROCEDURE — 250N000011 HC RX IP 250 OP 636

## 2021-07-21 PROCEDURE — 360N000075 HC SURGERY LEVEL 2, PER MIN: Performed by: INTERNAL MEDICINE

## 2021-07-21 PROCEDURE — 99231 SBSQ HOSP IP/OBS SF/LOW 25: CPT | Performed by: PHYSICIAN ASSISTANT

## 2021-07-21 PROCEDURE — 250N000011 HC RX IP 250 OP 636: Performed by: NURSE ANESTHETIST, CERTIFIED REGISTERED

## 2021-07-21 PROCEDURE — 370N000017 HC ANESTHESIA TECHNICAL FEE, PER MIN: Performed by: INTERNAL MEDICINE

## 2021-07-21 PROCEDURE — P9016 RBC LEUKOCYTES REDUCED: HCPCS | Performed by: INTERNAL MEDICINE

## 2021-07-21 PROCEDURE — 49083 ABD PARACENTESIS W/IMAGING: CPT

## 2021-07-21 PROCEDURE — 120N000005 HC R&B MS OVERFLOW UMMC

## 2021-07-21 PROCEDURE — 0F9G8ZZ DRAINAGE OF PANCREAS, VIA NATURAL OR ARTIFICIAL OPENING ENDOSCOPIC: ICD-10-PCS | Performed by: INTERNAL MEDICINE

## 2021-07-21 PROCEDURE — 250N000025 HC SEVOFLURANE, PER MIN: Performed by: INTERNAL MEDICINE

## 2021-07-21 PROCEDURE — 999N000181 XR SURGERY CARM FLUORO GREATER THAN 5 MIN W STILLS: Mod: TC

## 2021-07-21 PROCEDURE — 85610 PROTHROMBIN TIME: CPT

## 2021-07-21 DEVICE — STENT AND ELECTROCAUTERY - ENHANCED DELIVERY SYSTEM
Type: IMPLANTABLE DEVICE | Site: STOMACH | Status: NON-FUNCTIONAL
Brand: AXIOS™
Removed: 2021-08-06

## 2021-07-21 DEVICE — STENT ENDOPROS BILIARY GORE VIABIL W/O HL 10X80MM VN1008200: Type: IMPLANTABLE DEVICE | Site: STOMACH | Status: FUNCTIONAL

## 2021-07-21 RX ORDER — LABETALOL HYDROCHLORIDE 5 MG/ML
10 INJECTION, SOLUTION INTRAVENOUS
Status: DISCONTINUED | OUTPATIENT
Start: 2021-07-21 | End: 2021-07-21 | Stop reason: HOSPADM

## 2021-07-21 RX ORDER — CEFTRIAXONE 1 G/1
1 INJECTION, POWDER, FOR SOLUTION INTRAMUSCULAR; INTRAVENOUS EVERY 24 HOURS
Status: DISCONTINUED | OUTPATIENT
Start: 2021-07-21 | End: 2021-07-22

## 2021-07-21 RX ORDER — ONDANSETRON 2 MG/ML
4 INJECTION INTRAMUSCULAR; INTRAVENOUS EVERY 30 MIN PRN
Status: DISCONTINUED | OUTPATIENT
Start: 2021-07-21 | End: 2021-07-21 | Stop reason: HOSPADM

## 2021-07-21 RX ORDER — HALOPERIDOL 5 MG/ML
1 INJECTION INTRAMUSCULAR
Status: DISCONTINUED | OUTPATIENT
Start: 2021-07-21 | End: 2021-07-21 | Stop reason: HOSPADM

## 2021-07-21 RX ORDER — ONDANSETRON 4 MG/1
4 TABLET, ORALLY DISINTEGRATING ORAL EVERY 6 HOURS PRN
Status: DISCONTINUED | OUTPATIENT
Start: 2021-07-21 | End: 2021-09-23

## 2021-07-21 RX ORDER — PROPOFOL 10 MG/ML
INJECTION, EMULSION INTRAVENOUS PRN
Status: DISCONTINUED | OUTPATIENT
Start: 2021-07-21 | End: 2021-07-21

## 2021-07-21 RX ORDER — MEROPENEM 1 G/1
1 INJECTION, POWDER, FOR SOLUTION INTRAVENOUS ONCE
Status: COMPLETED | OUTPATIENT
Start: 2021-07-21 | End: 2021-07-21

## 2021-07-21 RX ORDER — HYDROMORPHONE HYDROCHLORIDE 1 MG/ML
2 SOLUTION ORAL EVERY 4 HOURS PRN
Status: CANCELLED | OUTPATIENT
Start: 2021-07-21

## 2021-07-21 RX ORDER — MEROPENEM 1 G/1
1 INJECTION, POWDER, FOR SOLUTION INTRAVENOUS EVERY 8 HOURS
Status: DISCONTINUED | OUTPATIENT
Start: 2021-07-21 | End: 2021-07-21

## 2021-07-21 RX ORDER — LIDOCAINE 40 MG/G
CREAM TOPICAL
Status: DISCONTINUED | OUTPATIENT
Start: 2021-07-21 | End: 2021-07-21 | Stop reason: HOSPADM

## 2021-07-21 RX ORDER — FENTANYL CITRATE 50 UG/ML
50 INJECTION, SOLUTION INTRAMUSCULAR; INTRAVENOUS EVERY 5 MIN PRN
Status: DISCONTINUED | OUTPATIENT
Start: 2021-07-21 | End: 2021-07-21 | Stop reason: HOSPADM

## 2021-07-21 RX ORDER — MEPERIDINE HYDROCHLORIDE 25 MG/ML
12.5 INJECTION INTRAMUSCULAR; INTRAVENOUS; SUBCUTANEOUS EVERY 5 MIN PRN
Status: DISCONTINUED | OUTPATIENT
Start: 2021-07-21 | End: 2021-07-21 | Stop reason: HOSPADM

## 2021-07-21 RX ORDER — ONDANSETRON 2 MG/ML
4 INJECTION INTRAMUSCULAR; INTRAVENOUS EVERY 6 HOURS PRN
Status: DISCONTINUED | OUTPATIENT
Start: 2021-07-21 | End: 2021-09-23

## 2021-07-21 RX ORDER — LINEZOLID 600 MG/1
600 TABLET, FILM COATED ORAL EVERY 12 HOURS SCHEDULED
Status: DISCONTINUED | OUTPATIENT
Start: 2021-07-21 | End: 2021-07-21

## 2021-07-21 RX ORDER — IOPAMIDOL 510 MG/ML
INJECTION, SOLUTION INTRAVASCULAR PRN
Status: DISCONTINUED | OUTPATIENT
Start: 2021-07-21 | End: 2021-07-21 | Stop reason: HOSPADM

## 2021-07-21 RX ORDER — FLUMAZENIL 0.1 MG/ML
0.2 INJECTION, SOLUTION INTRAVENOUS
Status: ACTIVE | OUTPATIENT
Start: 2021-07-21 | End: 2021-07-22

## 2021-07-21 RX ORDER — HYDROMORPHONE HCL IN WATER/PF 6 MG/30 ML
0.2 PATIENT CONTROLLED ANALGESIA SYRINGE INTRAVENOUS EVERY 5 MIN PRN
Status: DISCONTINUED | OUTPATIENT
Start: 2021-07-21 | End: 2021-07-21 | Stop reason: HOSPADM

## 2021-07-21 RX ORDER — DIPHENHYDRAMINE HCL 25 MG
25 CAPSULE ORAL EVERY 6 HOURS PRN
Status: DISCONTINUED | OUTPATIENT
Start: 2021-07-21 | End: 2021-07-21 | Stop reason: HOSPADM

## 2021-07-21 RX ORDER — FENTANYL CITRATE 50 UG/ML
INJECTION, SOLUTION INTRAMUSCULAR; INTRAVENOUS PRN
Status: DISCONTINUED | OUTPATIENT
Start: 2021-07-21 | End: 2021-07-21

## 2021-07-21 RX ORDER — SODIUM CHLORIDE 9 MG/ML
INJECTION, SOLUTION INTRAVENOUS CONTINUOUS PRN
Status: DISCONTINUED | OUTPATIENT
Start: 2021-07-21 | End: 2021-07-21

## 2021-07-21 RX ORDER — ONDANSETRON 2 MG/ML
INJECTION INTRAMUSCULAR; INTRAVENOUS PRN
Status: DISCONTINUED | OUTPATIENT
Start: 2021-07-21 | End: 2021-07-21

## 2021-07-21 RX ORDER — DIPHENHYDRAMINE HYDROCHLORIDE 50 MG/ML
25 INJECTION INTRAMUSCULAR; INTRAVENOUS EVERY 6 HOURS PRN
Status: DISCONTINUED | OUTPATIENT
Start: 2021-07-21 | End: 2021-07-21 | Stop reason: HOSPADM

## 2021-07-21 RX ORDER — SODIUM CHLORIDE, SODIUM LACTATE, POTASSIUM CHLORIDE, CALCIUM CHLORIDE 600; 310; 30; 20 MG/100ML; MG/100ML; MG/100ML; MG/100ML
INJECTION, SOLUTION INTRAVENOUS CONTINUOUS
Status: DISCONTINUED | OUTPATIENT
Start: 2021-07-21 | End: 2021-07-21 | Stop reason: HOSPADM

## 2021-07-21 RX ORDER — ONDANSETRON 4 MG/1
4 TABLET, ORALLY DISINTEGRATING ORAL EVERY 30 MIN PRN
Status: DISCONTINUED | OUTPATIENT
Start: 2021-07-21 | End: 2021-07-21 | Stop reason: HOSPADM

## 2021-07-21 RX ORDER — LINEZOLID 100 MG/5ML
600 GRANULE, FOR SUSPENSION ORAL EVERY 12 HOURS SCHEDULED
Status: COMPLETED | OUTPATIENT
Start: 2021-07-21 | End: 2021-07-25

## 2021-07-21 RX ORDER — ALBUTEROL SULFATE 0.83 MG/ML
2.5 SOLUTION RESPIRATORY (INHALATION) EVERY 4 HOURS PRN
Status: DISCONTINUED | OUTPATIENT
Start: 2021-07-21 | End: 2021-07-21 | Stop reason: HOSPADM

## 2021-07-21 RX ORDER — SODIUM CHLORIDE, SODIUM LACTATE, POTASSIUM CHLORIDE, CALCIUM CHLORIDE 600; 310; 30; 20 MG/100ML; MG/100ML; MG/100ML; MG/100ML
INJECTION, SOLUTION INTRAVENOUS CONTINUOUS PRN
Status: DISCONTINUED | OUTPATIENT
Start: 2021-07-21 | End: 2021-07-21

## 2021-07-21 RX ADMIN — CIPROFLOXACIN 250 MG: 250 TABLET ORAL at 08:09

## 2021-07-21 RX ADMIN — Medication 100 MG: at 12:12

## 2021-07-21 RX ADMIN — MEROPENEM 1 G: 1 INJECTION INTRAVENOUS at 12:25

## 2021-07-21 RX ADMIN — HYDROMORPHONE HYDROCHLORIDE 2 MG: 1 SOLUTION ORAL at 07:46

## 2021-07-21 RX ADMIN — PROPOFOL 90 MG: 10 INJECTION, EMULSION INTRAVENOUS at 12:11

## 2021-07-21 RX ADMIN — MELATONIN TAB 3 MG 6 MG: 3 TAB at 00:31

## 2021-07-21 RX ADMIN — MELATONIN TAB 3 MG 6 MG: 3 TAB at 23:37

## 2021-07-21 RX ADMIN — SODIUM CHLORIDE, POTASSIUM CHLORIDE, SODIUM LACTATE AND CALCIUM CHLORIDE: 600; 310; 30; 20 INJECTION, SOLUTION INTRAVENOUS at 12:01

## 2021-07-21 RX ADMIN — SODIUM CHLORIDE: 9 INJECTION, SOLUTION INTRAVENOUS at 12:01

## 2021-07-21 RX ADMIN — PHENYLEPHRINE HYDROCHLORIDE 200 MCG: 10 INJECTION INTRAVENOUS at 12:46

## 2021-07-21 RX ADMIN — HYDROMORPHONE HYDROCHLORIDE 2 MG: 1 SOLUTION ORAL at 19:48

## 2021-07-21 RX ADMIN — CEFTRIAXONE SODIUM 1 G: 1 INJECTION, POWDER, FOR SOLUTION INTRAMUSCULAR; INTRAVENOUS at 17:36

## 2021-07-21 RX ADMIN — Medication 550 MG: at 20:13

## 2021-07-21 RX ADMIN — PHENYLEPHRINE HYDROCHLORIDE 100 MCG: 10 INJECTION INTRAVENOUS at 13:02

## 2021-07-21 RX ADMIN — FENTANYL CITRATE 25 MCG: 50 INJECTION, SOLUTION INTRAMUSCULAR; INTRAVENOUS at 14:16

## 2021-07-21 RX ADMIN — HYDROXYZINE HYDROCHLORIDE 50 MG: 25 TABLET, FILM COATED ORAL at 15:33

## 2021-07-21 RX ADMIN — SUGAMMADEX 200 MG: 100 INJECTION, SOLUTION INTRAVENOUS at 13:53

## 2021-07-21 RX ADMIN — SUGAMMADEX 200 MG: 100 INJECTION, SOLUTION INTRAVENOUS at 13:47

## 2021-07-21 RX ADMIN — HYDROMORPHONE HYDROCHLORIDE 2 MG: 1 SOLUTION ORAL at 00:32

## 2021-07-21 RX ADMIN — ROCURONIUM BROMIDE 50 MG: 10 INJECTION INTRAVENOUS at 12:55

## 2021-07-21 RX ADMIN — LINEZOLID 600 MG: 100 SUSPENSION ORAL at 21:17

## 2021-07-21 RX ADMIN — HYDROMORPHONE HYDROCHLORIDE 2 MG: 1 SOLUTION ORAL at 03:52

## 2021-07-21 RX ADMIN — PHYTONADIONE 10 MG: 10 INJECTION, EMULSION INTRAMUSCULAR; INTRAVENOUS; SUBCUTANEOUS at 20:16

## 2021-07-21 RX ADMIN — HYDROXYZINE HYDROCHLORIDE 50 MG: 25 TABLET, FILM COATED ORAL at 08:09

## 2021-07-21 RX ADMIN — FENTANYL CITRATE 25 MCG: 50 INJECTION, SOLUTION INTRAMUSCULAR; INTRAVENOUS at 14:06

## 2021-07-21 RX ADMIN — HYDROMORPHONE HYDROCHLORIDE 2 MG: 1 SOLUTION ORAL at 15:34

## 2021-07-21 RX ADMIN — ONDANSETRON 4 MG: 2 INJECTION INTRAMUSCULAR; INTRAVENOUS at 13:26

## 2021-07-21 RX ADMIN — FENTANYL CITRATE 50 MCG: 50 INJECTION, SOLUTION INTRAMUSCULAR; INTRAVENOUS at 12:10

## 2021-07-21 RX ADMIN — HYDROMORPHONE HYDROCHLORIDE 2 MG: 1 SOLUTION ORAL at 23:37

## 2021-07-21 RX ADMIN — HYDROXYZINE HYDROCHLORIDE 50 MG: 25 TABLET, FILM COATED ORAL at 21:31

## 2021-07-21 ASSESSMENT — ACTIVITIES OF DAILY LIVING (ADL)
ADLS_ACUITY_SCORE: 25
ADLS_ACUITY_SCORE: 20
ADLS_ACUITY_SCORE: 18
ADLS_ACUITY_SCORE: 18
ADLS_ACUITY_SCORE: 25

## 2021-07-21 NOTE — PROVIDER NOTIFICATION
Paged overnight Daniela TOVAR with update. Pt's hemoglobin this AM critical at 6.6. Received orders to prepare & transfuse 1 unit PRBC's. Will continue to monitor per POC and update primary team with changes.

## 2021-07-21 NOTE — PROGRESS NOTES
[facility]    Progress Note - [place] 3 Service        Date of Admission:  6/28/2021    Assessment & Plan   Dax Villareal is a 30 year old male with hx of alcohol use disorder admitted on 6/28/2021 after recent prolonged admission at [facility] for acute alcoholic hepatitis who has ESLD c/b HE, ARF requiring HD, and SBP and malnutrition in the setting of acute necrotizing pancreatitis, s/p PEG-J placement on 7/13. He arrived with acute respiratory failure and septic shock that have resolved, and his SBP has been appropriately treated. He requires continued management for ARF, hepatic encephalopathy, infected pancreatic pseudocyst, and nutritional support via PEG-J.       Changes today:   - Cystogastrostomy and therapeutic para today  - Hgb drop to 6.6, pRBCs, FFP and another 10 of IV Vit K prior to procedure  - Extra PRN Dilaudid available for increased pain after procedure  - Dialysis tomorrow  - Need to have discussion regarding importance of nutrition, encourage TFs    - Repeat CXR tomorrow  - Sputum clx positive for Enterococcus; ID following     #Necrotizing pancreatitis w/ infected pseudocyst s/p RP Drain, improving  #Septic Shock, resolved   #SBP, resolved  #Leukocytosis, acute rise iso down trending WBCs  #Peripancreatic fluid collection growing VRE   Etiology of the septic shock that he came in with likely due to necrotizing pancreatitis, infected pseudocyst, and SBP. Had percutaneous RP drain placed for drainage of pancreatic collection. Diagnostic para returned with SBP. Repeat CT 7/11 showed significant decrease in size of walled off necrosis and unchanged moderate ascites. S/p therapeutic paracentesis 7/13 (1L removed).      Per discussion with ID, ongoing leukocytosis is not unexpected with nec panc. CT 7/18 showed unchanged moderate ascites. GI feels cystgastrostomy would be helpful due to unchanged size of peripancreatic fluid  collcetion.     - Daily CBC  - MAP goal > 65  - GI following, appreciate recs               - Cystogastrostomy today    - Therapeutic para today               - pancreatic enzymes    - For back pain: Tylenol PRN and Dilaudid 2mg q3h PRN   - 1-2mg q4h PRN available for breakthrough pain following procedure  - Cipro ppx for SBP; 250 mg daily, ok to give through tube     #New onset productive cough with RLL haziness  7/19 evening, patient developed increasing cough with thick, clear secretions. CXR w/ increased RLL haziness; ddx includes pleural effusion v pneumonia v atelectasis. Most likely effusion as patient is likely fluid up due to declining dialysis and therapeutic para; unlikely pneumonia as sputum is non-purulent and patient is afebrile. However, there is a low threshold for further infectious workup if patient becomes febrile or clinically deteriorates. 7/21, cough/SOB has improved and patient feeling well, but sputum clx grew Enterococcus faecium, thought to be contaminant.   - Repeat CXR 7/22 after therapetic para  - Discussed with ID     # Severe malnutrition in setting of chronic illness  PEG-J placed 7/13. Procedure went well, patient and nutrition have discussed plan to run TFs during the day only from 12-6 pm at 70 mL/hr and work on PO intake. FWF 30 mL q4h. Will keep G tube open/vented.   - Continue Calorie Counts  - Dietitian consult for new tube feeds, appreciate recs              - Continue TFs at 70 mL/hr, 12-6 pm                - Cont panc enzymes   - Will discuss importance of nutrition with patient, why we strongly encourage TFs   - Regular Diet, thin liquids per SLP  - Continue multivitamins  - Pantoprazole 40 mg IV daily  - Zofran PRN     # Decompensated EtOH cirrhosis c/b hepatic encephalopathy and recurrent ascites  # Hx of Alcohol use disorder c/b withdrawal   # Anemia  # Coagulopathy   Patient with a history of marked alcohol use and alcoholic hepatitis in the past. Was given a course of  steroids (1 week) during previous admission at OSH. Patient mental status has been stable, alert and oriented x4 for a couple weeks, well managed on lactulose regimen. Patient has consistently had 1-2 pRBC infusions weekly d/t Hgb drops of 0.3-0.5 every day (etilogy likely chronic liver disease, chronic kidney disease, phlebotomy, dilution, poor nutrition, ect). Today, Hgb down to 6.6, will get pRBC, FFP and IV Vitamin K prior to cystogastrostomy today.   - Lactulose 20g QID and PRN and rifaximin 550mg BID (titrate to 1.5L stool/day)  - Trend MELD labs  - Daily folic acid and thiamine  - Delirium precautions  - Melatonin 6mg at bedtime  - Daily CBC   - Transfuse if hemoglobin < 7, platelet < 10 or < 30 with bleeding  - Continue to monitor for signs of bleeding     MELD-Na score: 32 at 7/21/2021  5:50 AM  MELD score: 29 at 7/21/2021  5:50 AM  Calculated from:  Serum Creatinine: 2.69 mg/dL at 7/21/2021  5:50 AM  Serum Sodium: 130 mmol/L at 7/21/2021  5:50 AM  Total Bilirubin: 5.5 mg/dL at 7/21/2021  5:50 AM  INR(ratio): 1.75 at 7/21/2021  5:50 AM  Age: 30 years     # ARF on HD (MWF)  # Hypervolemia causing acute respiratory failure, resolved     > Hemodialysis as per renal              - Midodrine 10 mg PRN before dialysis sessions     > Daily chemistry labs, strict I/O, and daily weights  - Aspiration precautions  - Supplemental oxygen as needed to keep sats above 89%     # Goals of Care  Please see additional interval note for in depth details regarding conversation on 7/9 about goals of care. Patient's long term prognosis is still quite poor given his multi organ failure. Care conference on 7/15, pt did not wish to participate. Met with sisters Noni (on phone) and Melyssa and close friend Leroy with palliative, nephrology and general medicine teams today to discuss Dax's current improving status, poor prognosis and wishes to continue full cares. Plan to meet Fridays at 4 pm with teams and family for weekly care  conferences.  - Hepatology to write a note weighing in on transplant status to help shape expectations and conversation with family this week     Diet: Snacks/Supplements Adult: Ensure Clear; With Meals  Calorie Counts  Fluid restriction 1200 ML FLUID  Adult Formula Drip Feeding: Specified Time Vital 1.5; Jejunostomy; Goal Rate: 70; mL/hr; From: 12:00 PM; 6:00 PM; Medication - Feeding Tube Flush Frequency: At least 15-30 mL water before and after medication administration and with tube clogging;...    DVT Prophylaxis: Pneumatic Compression Devices  Rdz Catheter: Not present  Fluids: 5 mL NS TKO  Central Lines: PRESENT  PICC Triple Lumen 06/28/21 Left-Site Assessment: WDL  CVC Double Lumen Right-Site Assessment: WDL  Code Status: Full Code      Disposition Plan   Expected discharge: 07/26/2021 recommended to transitional care unit once  nutrition improved, infected pancreatic pseudocyst is well managed and plan for dialysis is in place.     The patient's care was discussed with the Attending Physician, Dr. South.    Jaime Amin, MS3  Medical Student  [place] [id number] Service    Resident/Fellow Attestation   I, Rosa Elena Shore, was present with the medical/NAHUM student who participated in the service and in the documentation of the note.  I have verified the history and personally performed the physical exam and medical decision making.  I agree with the assessment and plan of care as documented in the note.      Rosa Elena Shore MD  PGY2  Date of Service (when I saw the patient): 07/21/21  _____________________________________________________________________    Interval History   No acute events overnight. Nursing notes reviewed.    This morning, patient was feeling comfortable. Dyspnea resolved, no nausea/vomiting and pain well controlled. No fever or chest pain.    Data reviewed today: I reviewed all medications, new labs and imaging results over the last 24 hours.     Physical Exam   Vital Signs: Temp: 98.2  F (36.8   C) Temp src: Oral BP: 115/49 Pulse: 115   Resp: 16 SpO2: 100 % O2 Device: None (Room air) Oxygen Delivery: 3 LPM  Weight: 173 lbs .98 oz    Gen: Lying down in bed, appears comfortable. Talking more, seems apprehensive about procedures later on today but feeling okay. Similarly interactive as the past few days.   HEENT: Conjunctival icterus  CV: Tachycardic. Regular rhythm.   Resp: Breathing comfortably on RA, no coughing or dyspnea while talking. Upper lobes CTA. Crackles in RLL, improved from yesterday.  Abd: Refused palpation today, PEG J in place without surrounding erythema   Skin: Jaundiced.  Ext: Well perfused, no edema  Neuro: Alert and oriented, responding appropriately to questions    Data   Recent Labs   Lab 07/21/21  1133 07/21/21  0550 07/20/21  0601 07/19/21  0632   WBC  --  21.7* 24.3* 21.0*   HGB  --  6.6* 7.0* 7.0*   MCV  --  96 95 96   PLT  --  250 271 251   INR  --  1.75* 1.75* 1.80*   NA  --  130* 126* 127*   POTASSIUM  --  3.8 5.4* 5.1   CHLORIDE  --  97 94 96   CO2  --  24 19* 21   BUN  --  33* 62* 50*   CR  --  2.69* 4.22* 3.47*   ANIONGAP  --  9 13 10   JANENE  --  8.3* 8.6 8.6   * 110* 103* 83   ALBUMIN  --  2.1* 2.1* 2.1*   PROTTOTAL  --  6.7* 7.1 6.7*   BILITOTAL  --  5.5* 6.3* 6.3*   ALKPHOS  --  175* 180* 190*   ALT  --  15 16 19   AST  --  47* 50* 63*     Recent Results (from the past 24 hour(s))   IR Paracentesis    Narrative    PROCEDURES 7/21/2021:  1. Ultrasound guided paracentesis.    History: Alcoholic hepatitis complicated by SBP and loculated ascites,  necrotizing pancreatitis..    Comparison: CT abdomen and pelvis dated 7/18/2021    Staff: Shital Saldaña MD    Fellow/Resident: ALLISON Hinton DO    Complications: None    PROCEDURE: The patient understood the limitations, alternatives, and  risks of the procedure and requested the procedure be performed. Both  written and oral consent were obtained.    Right lower quadrant was prepped and draped in the usual sterile  fashion. 1%  lidocaine without epinephrine was used for local  anesthesia. Under ultrasound guidance, a 5 [place] [employer] Payfone centesis  needle catheter was advanced into the peritoneal space. Image  documenting position was entered into the patient's permanent record.    Manual aspiration was performed. 60 cc ascites aspirated. Catheter  removed. Sterile dressing applied. No immediate complication.      Impression    IMPRESSION: Ultrasound guided paracentesis. 60 cc ascites aspirated.  60 cc aspirated and sent to the lab as requested.    XR Surgery CESIA G/T 5 Min Fluoro w Stills    Narrative    This exam was marked as non-reportable because it will not be read by a   radiologist or a [place] non-radiologist provider.

## 2021-07-21 NOTE — PROGRESS NOTES
Patient Name: Dax Villareal  Medical Record Number: [id number]  Today's Date: 7/21/2021    Procedure: Paracentesis.  Proceduralist: MD Chi.    Procedure Start: 0900  Procedure end: 0905  Sedation medications administered: none     Report given to: KIRSTEN RN  : n/a    Other Notes: Pt arrived to IR room 7 from . Consent reviewed. Pt denies any questions or concerns regarding procedure. Pt positioned supine and monitored per protocol.60 mls removed and specimens sent to lab.  Pt tolerated procedure without any noted complications. Pt transferred back to .    Shelby Jo, RN

## 2021-07-21 NOTE — PROGRESS NOTES
Calorie Count  Intake recorded for: 7/20  Total Kcals: 0 Total Protein: 0g  Kcals from Hospital Food: 0   Protein: 0g  Kcals from Outside Food (average):0 Protein: 0g  # Meals Ordered from Kitchen: 1  # Meals Recorded: 0  # Supplements Recorded: 0

## 2021-07-21 NOTE — ANESTHESIA PREPROCEDURE EVALUATION
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.91]   Procedure : Procedure(s):  ENDOSCOPIC ULTRASOUND, ESOPHAGOSCOPY / UPPER GASTROINTESTINAL TRACT (GI)     Past Medical History:   Diagnosis Date     2008      Past Surgical History:   Procedure Laterality Date     ENDOSCOPIC INSERTION TUBE GASTROSTOMY N/A 2021    Procedure: Upper endoscopy, INSERTION, PEG-J TUBE and Gastropexy;  Surgeon: Rayshawn Will MD;  Location: [facility] OR     IR ABSCESS TUBE CHANGE  2021     ORTHOPEDIC SURGERY Right     fracture repair      Allergies   Allergen Reactions     Tylenol [Acetaminophen] Itching      Social History     Tobacco Use     Smoking status: Current Every Day Smoker     Smokeless tobacco: Never Used   Substance Use Topics     Alcohol use: Yes      Wt Readings from Last 1 Encounters:   21 78.5 kg (173 lb 1 oz)        Anesthesia Evaluation   Pt has not had prior anesthetic         ROS/MED HX  ENT/Pulmonary:       Neurologic:       Cardiovascular:  - neg cardiovascular ROS     METS/Exercise Tolerance:     Hematologic:  - neg hematologic  ROS     Musculoskeletal:  - neg musculoskeletal ROS     GI/Hepatic:     (+) hepatitis type Alcoholic,     Renal/Genitourinary:     (+) renal disease, type: ARF, Pt requires dialysis,     Endo:       Psychiatric/Substance Use:     (+) alcohol abuse     Infectious Disease:  - neg infectious disease ROS     Malignancy:  - neg malignancy ROS     Other:            Physical Exam    Airway        Mallampati: II   TM distance: > 3 FB   Neck ROM: full   Mouth opening: > 3 cm    Respiratory Devices and Support         Dental         B=Bridge, C=Chipped, L=Loose, M=Missing    Cardiovascular   cardiovascular exam normal          Pulmonary   pulmonary exam normal        breath sounds clear to auscultation           OUTSIDE LABS:  CBC:   Lab Results   Component Value Date     WBC 21.7 (H) 07/21/2021    WBC 24.3 (H) 07/20/2021    HGB 6.6 (LL) 07/21/2021    HGB 7.0 (L) 07/20/2021    HCT 20.5 (L) 07/21/2021    HCT 21.2 (L) 07/20/2021     07/21/2021     07/20/2021     BMP:   Lab Results   Component Value Date     (L) 07/21/2021     (L) 07/20/2021    POTASSIUM 3.8 07/21/2021    POTASSIUM 5.4 (H) 07/20/2021    CHLORIDE 97 07/21/2021    CHLORIDE 94 07/20/2021    CO2 24 07/21/2021    CO2 19 (L) 07/20/2021    BUN 33 (H) 07/21/2021    BUN 62 (H) 07/20/2021    CR 2.69 (H) 07/21/2021    CR 4.22 (H) 07/20/2021     (H) 07/21/2021     (H) 07/20/2021     COAGS:   Lab Results   Component Value Date    PTT 97 (H) 06/28/2021    INR 1.75 (H) 07/21/2021    FIBR 225 06/28/2021     POC:   Lab Results   Component Value Date     (H) 07/05/2021     HEPATIC:   Lab Results   Component Value Date    ALBUMIN 2.1 (L) 07/21/2021    PROTTOTAL 6.7 (L) 07/21/2021    ALT 15 07/21/2021    AST 47 (H) 07/21/2021    ALKPHOS 175 (H) 07/21/2021    BILITOTAL 5.5 (H) 07/21/2021    ADALBERTO 28 06/28/2021     OTHER:   Lab Results   Component Value Date    LACT 1.4 07/20/2021    JANENE 8.3 (L) 07/21/2021    PHOS 2.0 (L) 07/16/2021    MAG 1.6 07/15/2021    LIPASE 199 06/28/2021    CRP 16.9 (H) 10/06/2020       Anesthesia Plan    ASA Status:  3      Anesthesia Type: General.     - Airway: ETT   Induction: Propofol, Intravenous, RSI.   Maintenance: Balanced.   Techniques and Equipment:       - Blood: PRBC, Blood in Room     Consents    Anesthesia Plan(s) and associated risks, benefits, and realistic alternatives discussed. Questions answered and patient/representative(s) expressed understanding.     - Discussed with:  Patient      - Extended Intubation/Ventilatory Support Discussed: No.      - Patient is DNR/DNI Status: No    Use of blood products discussed: No .     Postoperative Care    Pain management: IV analgesics.   PONV prophylaxis: Ondansetron (or other 5HT-3)     Comments:                 Milo Fish MD

## 2021-07-21 NOTE — BRIEF OP NOTE
[facility] Brief Operative Note    Pre-operative diagnosis: Necrotizing pancreatitis [K85.91]   Post-operative diagnosis * No post-op diagnosis entered *   Procedure: Procedure(s):  ENDOSCOPIC ULTRASOUND, ESOPHAGOSCOPY / UPPER GASTROINTESTINAL TRACT (GI) with cyst gastrostomy, dilation   Surgeon: Guru Ginna MD       Estimated blood loss: None    Specimens: None   Findings:      EUS guided cystgastrostomy with placement of 15 mm Axios stent dilated to 12 mm. Unclear if inner flange was opening completely. A 10 mm by 8 cm Viabil stent was placed to secure the cystgastrostomy    Recommendations    No ASA or anticoagulation for 3 days    Maintain INR <1.5 by Vitamin K

## 2021-07-21 NOTE — PLAN OF CARE
Shift [id number]    Neuro: AxOx4, follows commands, moves ext. PERRLA 3 Brisk  Resp: 95-97% RA. Coarse crackles RUL. Intermittent give, tessalon given, improved cough frequency  Cardiac: 's. -120's. Afebrile  GI/: Anuria related to HD. 2 BM, Tube feed off. 2G Sodium Restricted diet, 2L fluid restriction.   Pain: Continued abdominal pain 7/10, receiving dilaudid 2mg Q3. Pt continues to refuse dressing changes or cleaning of Jtube/Gtube site due to pain.   Skin: Coccyx/groin reddened, barrier cream applied. No new deficits noted  LDA's: Left arm PICC triple lumen. Gtube to gravity.  Jtube clamped. Free water flush 30ml/4hr. Abd drain with brown 10cc output, Q8 NS flushes.      Plan: Paracentesis with IR 7/21/21 and Cystogastrostomy. Continue with POC, notify primary team with changes.

## 2021-07-21 NOTE — PLAN OF CARE
Neuro: Patient alert and oriented x4. Continues to refuse some cares.   Cardiac: 's-110's. BP's stable. Afebrile.             Respiratory: Sating high 90s on RA. Requested oxygen for comfort.   GI/: Aneuric. Loose BMs x1. Stool sample sent.   Diet/appetite: Cycled TF @ 70 mls/hour with 30cc flushes Q4 hours. 12p-6p cycled. Eating 2g Na diet.   Activity: Total lift assist to be out of bed. Turns when patient allows.   Pain: Reports pain in abdomen and back. Dilaudid given PO q3 hours.  Skin: Coccyx red but blanchable. Barrier cream applied.   LDA's: G and J tube, triple lumen PICC, abdominal drain dressing change refused.       Plan: Paracentesis and cystogastrostomy completed today. 1 unit PRBC's and 2 units FFP given in OR. Patient currently in PACU. Will continue with plan of care.

## 2021-07-21 NOTE — PROGRESS NOTES
[facility] PROGRESS NOTE     Patient:  Dax Villareal   Date of birth 1990, Medical record number [id number]  Date of Visit:  07/21/2021  Date of Admission: 6/28/2021  Consult Requester:Patricia Baker, *          Assessment and Plan:   RECOMMENDATIONS:   1. No indication to treat for VRE pneumonia at this point. Continue to monitor clinical status closely for development of hypoxia or worsening imaging findings.  2. SBP prophylaxis with Cipro  3. Follow paracetesis labs collected 7/21     ADDENDUM: ascites labs with 453 WBC, 71% neutrophils. Given finding of SBP would start antibiotics to treat. As he has just completed a course of meropenem and does not have known history of ESBL organisms, would use ceftriaxone 2g daily for empiric SBP coverage. Would also treat with linezolid 600mg BID for VRE coverage given prior VRE isolated at time of abdominal drain exchange. Antibiotics can be adjusted based on ascites fluid culture if needed.      ASSESSMENT:  Dax Villarela is a 30 year old male with PMHx significant for alcohol use disorder who was admitted to [facility] in [place], [place] for abdominal pain, nausea and vomiting, found to have end stage liver disease c/b hepatic encephalopathy, acute renal failure requiring iHD, SBP and acute necrotizing pancreatitis.      Afebrile on admission, however has spiked some low grade intermittent fevers up to 100.9 on 7/1-2 then defervesced. WBC significantly elevated on admission to 50K with slow downtrend over the past few weeks, currently 17K. Lactate intermittently elevated into the 2-3 range, otherwise wnl. Infectious work up since admission to [facility] has remained negative. BCx negative. Drain placement pancreatic cultures collected 6/30 NGTD/negative. 7/2 Ascites fluid cultures negative, labs included WBC 2663/74% neutrophils. Repeat ascites fluid cultures 7/5 with 1801 WBC/61% neutrophils, cultures negative. 7/13 ascites fluid  "cultures NGTD. See imaging section below for review.      Started on meropenem (6/29-7/18), micafungin (6/29-7/1), and given 1x dose of IV Vancomycin (6/29). On Rifaximin. He has now completed 4-5 days following replacement of perc drain for a total of 21 days of broad spectrum antibiotics s/p initial perc drain placement for necrotizing pancreatitis with pseudocyst. Cultures remain largely negative with exception of VRE (see below). As such transitioned to Cipro for SBP prophylaxis and monitoring off of broad abx.     Of note, Abscess culture collected 7/14 from his re-positioned drain with growth of VRE. When the culture was collected he was clinically stable and there was no evidence of a new infection with improvement in WBC count noted on serial ascites studies. As such, elected not to treat VRE as it was felt to most likely be colonization having been selected with prolonged meropenem coverage.      Thank you for this consult. ID will continue to follow.      Rosa Elena Horan PA-C  Pronouns: she/her/hers  Infectious Diseases  Pager: [phone]  07/21/2021           Interim History and Events:   Afebrile. Alternates between room air and 3L oxymask. No hypoxia recorded. Dax reports breathing feels good with oxymask in place. Abdominal pain is \"high\".          HPI:   Dax Villareal is a 30 year old male with PMHx significant for alcohol use disorder who was admitted to [facility] in [place], [place] for abdominal pain, nausea and vomiting, found to have end stage liver disease c/b hepatic encephalopathy, acute renal failure requiring iHD, SBP and acute necrotizing pancreatitis.      He required respiratory support (BIPAP, ? Intubation) for respiratory failure and appeared to be in sepsis (lactate reported at 7.7 with abnormal LFTs). He was transferred to [facility] for prolonged admission and reportedly treated with steroids; kidney function and pancreatic enzymes normal on admission). Length of stay " "stated >40 days. CT imaging 6/19 with concern for necrotizing pancreatitis with follow up CT scan 6/25 with peripancreatic fluid collection and 9 cm soft tissue/hemorrhagic structure c/f internal hemorrhage of a pseudocyst. Given multiple antibiotics (Cefepime, Flagyl, Ciprofloxacin, micafungin). Hospital course reportedly complicated by hypoxemic respiratory failure requiring BIPAP, ARF requiring iHD, hepatic encephalopathy, and septic shock 2/2 necrotizing pancreatitis/pancreatic pseudocyst requiring pressor support. Ultimately transferred to [facility] for interventions 6/28. Admitted to the ICU.      Now status post perc IR drain placement 6/29 with cultures that remain NGTD/negative. No endoscopic drainage by GI was pursued given improvement in imaging 7/11. Additionally has had 3 paracenteses (7/2, 7/5, and 7/13 with negative culture work up. Had PEGJ placed 7/13.      He is an outdoorsman. Picks mushrooms, goes fishing/hunting, and hiking. Has not been since \"last spring\". Denies feeling ill prior to admission to OSH. Feeling fairly well currently although notes diffuse abdominal pain. Has had multiple procedures within the past few days and states he feels like he needs a \"rest day\". Denies fever/chills. No URI sx. Is having diarrhea, but on lactulose. Anuric.          ROS:   -Focused 4 point ROS completed, pertinent positives and negatives listed above.    Physical Examination:  Temp: 98.2  F (36.8  C) Temp src: Oral BP: 104/56 Pulse: 112   Resp: 16 SpO2: 93 % O2 Device: None (Room air) Oxygen Delivery: 3 LPM    Vitals:    07/16/21 0546 07/17/21 0500 07/18/21 0500 07/18/21 1430   Weight: 74 kg (163 lb 2.3 oz) 78.2 kg (172 lb 6.4 oz) 78.5 kg (173 lb 1 oz) 77.5 kg (170 lb 13.7 oz)    07/20/21 0800   Weight: 78.5 kg (173 lb 1 oz)       Constitutional: Adult male patient seen lying in bed, in NAD.  HEENT: NCAT, icterus, conjunctiva clear. Moist mucous membranes.  Respiratory: Non-labored breathing on RA  Skin: Warm " and dry. Jaundiced. No rashes, scattered petechiae on torso.  Musculoskeletal: Extremities grossly normal. No tenderness or edema present.   Neurologic: A &O x3, speech normal, answering questions appropriately. Moves all extremities spontaneously. Grossly non-focal.  Neuropsychiatric: Mentation and affect normal/appropriate, somewhat flat.  VAD: CVC is c/d/i with no erythema, drainage, or tenderness.      Medications:    amylase-lipase-protease  2 capsule Oral TID w/meals     B and C vitamin Complex with folic acid  5 mL Per Feeding Tube Daily     ciprofloxacin  250 mg Oral Daily     folic acid  1 mg Oral Daily    Or     folic acid  1 mg Intravenous Daily     HYDROmorphone (STANDARD CONC)  2 mg Oral Once     lactulose  20 g Oral or NG Tube 4x Daily     melatonin  6 mg Oral At Bedtime     pantoprazole  40 mg Oral QAM AC     phytonadione  10 mg Intravenous Once     [Held by provider] potassium chloride  20 mEq Oral BID     rifaximin  550 mg Oral BID     sodium chloride (PF)  10 mL Irrigation Q8H     vitamin B1  100 mg Oral Daily    Or     thiamine  100 mg Intravenous Daily       Infusions/Drips:    - MEDICATION INSTRUCTIONS -       dextrose         Laboratory Data:   Inflammatory Markers    Recent Labs   Lab Test 10/06/20  0927   CRP 16.9*       Metabolic Studies       Recent Labs   Lab Test 07/21/21  0550 07/20/21  1423 07/20/21  0601 07/19/21  1314 07/19/21  0632 07/18/21  0637 07/17/21  2031 07/17/21  0506 07/16/21  0616 07/16/21  0616 07/15/21  0811 07/15/21  0443 07/14/21  1945 07/14/21  0345   *  --  126*  --  127* 128* 127* 130*  --  130*  130*  --  131*  --  130*   POTASSIUM 3.8  --  5.4*  --  5.1 5.6* 5.1 5.0  --  4.0  --  3.3*  --  4.2   CHLORIDE 97  --  94  --  96 97 97 99  --  99  --  98  --  98   CO2 24  --  19*  --  21 20 20 20  --  20  --  23  --  20   ANIONGAP 9  --  13  --  10 11 10 11  --  11  --  10  --  12   BUN 33*  --  62*  --  50* 68* 57* 51*  --  39*  --  26  --  42*   CR 2.69*  --   4.22*  --  3.47* 4.46* 4.08* 3.77*  --  3.23*  --  2.30*  --  3.35*   GFRESTIMATED 30*  --  18*  --  22* 16* 18* 20*  --  24*  --  36*  --  23*   *  --  103*  --  83 164* 142* 136*  --  151*  --  131*   < > 137*   JANENE 8.3*  --  8.6  --  8.6 8.4* 8.3* 7.9*  --  7.8*  --  8.1*  --  8.6   PHOS  --   --   --   --   --   --   --   --   --  2.0*  --  3.0  --  4.6*   MAG  --   --   --   --   --   --   --   --   --   --   --  1.6  --  1.6   LACT  --  1.4  --  1.6  --   --   --   --    < >  --    < >  --   --   --     < > = values in this interval not displayed.       Hepatic Studies    Recent Labs   Lab Test 07/21/21  0550 07/20/21  0601 07/19/21  0632 07/18/21  0637 07/17/21  0506 07/15/21  0443   BILITOTAL 5.5* 6.3* 6.3* 7.4* 7.5* 9.3*   ALKPHOS 175* 180* 190* 242* 175* 182*   ALBUMIN 2.1* 2.1* 2.1* 2.2* 2.3* 2.7*   AST 47* 50* 63* 128* 87* 86*   ALT 15 16 19 29 20 16       Pancreatitis testing    Recent Labs   Lab Test 06/29/21  0410 06/28/21  1839 05/18/21  1220 12/09/20  1504 10/06/20  0927   LIPASE  --  199 381 140 139   TRIG Unsatisfactory specimen - icteric Canceled, Test credited  --   --   --        Hematology Studies      Recent Labs   Lab Test 07/21/21  0550 07/20/21  0601 07/19/21  0632 07/18/21  0637 07/17/21  0506 07/16/21  0616 06/29/21  0410 06/28/21  1839 05/18/21  1220 12/09/20  1504 10/06/20  0927   WBC 21.7* 24.3* 21.0* 25.2* 27.3* 26.7*   < > 50.6* 13.2* 4.2 6.9   ANEU  --   --   --   --   --   --   --  44.0* 12.0* 2.1 5.2   ALYM  --   --   --   --   --   --   --  0.0* 0.6* 1.3 0.8   MEDHAT  --   --   --   --   --   --   --  2.0* 0.4 0.6 0.8   AEOS  --   --   --   --   --   --   --  0.5 0.0 0.2 0.1   HGB 6.6* 7.0* 7.0* 7.5* 7.3* 7.5*   < > 7.6* 16.6 17.2 18.8*   HCT 20.5* 21.2* 21.7* 22.6* 22.3* 23.0*   < > 23.4* 44.8 47.7 51.2    271 251 251 225 231   < > 127* 222 299 311    < > = values in this interval not displayed.       Arterial Blood Gas Testing    Recent Labs   Lab Test  21  0014 21  1716 21  1839   O2PER 60.0 5L 2L        Urine Studies     Recent Labs   Lab Test 20  1817 10/06/20  1214   URINEPH 7.0 7.0   NITRITE Negative Negative   LEUKEST Negative Negative   WBCU 0 1       Vancomycin Levels     Recent Labs   Lab Test 21  0700   VANCOMYCIN 31.2*       Microbiology:  Culture Micro   Date Value Ref Range Status   2021 No growth  Final   2021 Culture negative after 2 weeks  Preliminary   2021 No growth  Final   2021 No growth  Final   2021 No growth  Final   2021 No growth  Final   2021 No anaerobes isolated  Final   2021 No growth  Final   2021 No growth  Final   2021 No growth after 6 days  Final   2021 No growth after 6 days  Final       Last check of C difficile  C Difficile Toxin B by PCR   Date Value Ref Range Status   2021 Negative Negative Final     Comment:     A negative result does not exclude actual disease due to C. difficile and may be due to improper collection, handling and storage of the specimen or the number of organisms in the specimen is below the detection limit of the assay.       Imagin/19 CXR  IMPRESSION: Increased haziness of right lower lung field. Possible  effusion and/or pneumonia versus atelectasis. Recommend follow-up to  Clearing.     CXR  IMPRESSION: Unchanged mixed interstitial and airspace opacities most  prominent in the perihilar and bibasilar regions.  1.  Stable support equipment.     CT AP w/o contrast   IMPRESSION:   1. Sequela of necrotizing pancreatitis with highly decreased size of  walled off necrosis centered in the lesser sac. Percutaneous drain  remains in place.  2. Loculated moderate ascites is unchanged.  3. Hepatomegaly, hepatic steatosis, and evidence of portal  hypertension again seen.  4. Small bilateral pleural effusions are slightly improved.      CT AP w/o contrast   IMPRESSION:   1. Status post placement of a  left lateral approach drainage catheter,  substantially decreased but persistent hemorrhagic walled off necrosis  centered in the lesser sac. A smaller collection along the inferior  aspect of the third portion of the duodenum is minimally changed.  2. Minimally changed moderate to large volume loculated ascites.  3. Hepatomegaly and hepatic steatosis with evidence of cirrhosis and  portal hypertension.  4. Diffuse edematous wall thickening of the small and large bowel,  likely reactive. No bowel obstruction.  5. Small right and trace left pleural effusions with adjacent  compressive atelectasis, similar to prior. Small pericardial effusion.  6. Faint patchy ground glass opacities in the right middle lobe and  and right upper lobe, likely infectious/inflammatory.     6/29 CT AP w/ contrast   IMPRESSION:   1.  Sequela of necrotizing pancreatitis with peripancreatic necrotic  fluid collection, large volume abdominal ascites and mesenteric edema.  2.  Thickened, edematous small bowel wall is likely due to reactive  enteritis secondary to pancreatitis.  3.  Hepatomegaly with steatosis and mild surface nodularity,  associated with splenomegaly and recanalized paraumbilical vein,  suggestive of cirrhosis with portal hypertension.  4.  Enlarged hypoenhancing kidneys with loss of corticomedullary  differentiation, suggestive of acute renal parenchymal disease. No  hydronephrosis or perinephric fat stranding.  5.  Small right and trace left pleural effusions, interlobular septal  thickening, and diffuse anasarca, likely related to fluid third  spacing.

## 2021-07-21 NOTE — PLAN OF CARE
Neuro: A&Ox4. Able to make needs known using call light. Follows commands.   Cardiac: Sinus tach. HR 100s-115s.   Respiratory: Sating >90% on RA. Requests oxygen supplementally for anxiety.   GI/: Pt on HD, does not void. Small BM x1 this shift. Denied nausea.   Diet/appetite: NPO x meds for procedure this AM.   Activity:  Assist of 2 liko lift. Pt independently repositions in bed with encouragement/education.   Pain: Requested and received dilauded 2 mg x2 for abdominal pain.   Skin: Drain sites. Coccyx reddened- Wound care done per orders.   LDA's: HD line R chest, SL. TL PICC L arm     Plan: Continue with POC. Notify primary team with changes.     Hbg 6.6 this AM. Provider notified and orders RBC transfusion placed. Oncoming shift notified.

## 2021-07-21 NOTE — ANESTHESIA CARE TRANSFER NOTE
Patient: Dax Villareal    Procedure(s):  ENDOSCOPIC ULTRASOUND, ESOPHAGOSCOPY / UPPER GASTROINTESTINAL TRACT (GI) with cyst gastrostomy, dilation    Diagnosis: Necrotizing pancreatitis [K85.91]  Diagnosis Additional Information: No value filed.    Anesthesia Type:   General     Note:    Oropharynx: oropharynx clear of all foreign objects and spontaneously breathing  Level of Consciousness: drowsy  Oxygen Supplementation: face mask  Level of Supplemental Oxygen (L/min / FiO2): 10  Independent Airway: airway patency satisfactory and stable  Dentition: dentition unchanged  Vital Signs Stable: post-procedure vital signs reviewed and stable  Report to RN Given: handoff report given  Patient transferred to: PACU    Handoff Report: Identifed the Patient, Identified the Reponsible Provider, Reviewed the pertinent medical history, Discussed the surgical course, Reviewed Intra-OP anesthesia mangement and issues during anesthesia, Set expectations for post-procedure period and Allowed opportunity for questions and acknowledgement of understanding      Vitals:  Vitals Value Taken Time   /63 07/21/21 1402   Temp     Pulse 107 07/21/21 1410   Resp     SpO2 96 % 07/21/21 1410   Vitals shown include unvalidated device data.    Electronically Signed By: Lesli Day CRNA, APRN CRNA  July 21, 2021  2:11 PM

## 2021-07-21 NOTE — ANESTHESIA PROCEDURE NOTES
Airway       Patient location during procedure: OR       Procedure Start/Stop Times: 7/21/2021 12:13 PM  Staff -        CRNA: Lesli Day APRN CRNA       Performed By: CRNA and resident  Consent for Airway        Urgency: elective  Indications and Patient Condition       Indications for airway management: gera-procedural       Induction type:intravenous       Mask difficulty assessment: 0 - not attempted    Final Airway Details       Final airway type: endotracheal airway       Successful airway: ETT - single  Endotracheal Airway Details        ETT size (mm): 7.5       Cuffed: yes       Successful intubation technique: video laryngoscopy       VL Blade Size: MAC 4       Grade View of Cords: 1       Adjucts: stylet       Position: Right       Measured from: lips       Secured at (cm): 23       Bite block used: Soft    Post intubation assessment        Placement verified by: capnometry, equal breath sounds and chest rise        Number of attempts at approach: 1       Secured with: pink tape       Ease of procedure: easy       Dentition: Unchanged    Additional Comments       Intubation performed by BOAZ Valentine resident

## 2021-07-21 NOTE — ANESTHESIA POSTPROCEDURE EVALUATION
Patient: Dax Villareal    Procedure(s):  ENDOSCOPIC ULTRASOUND, ESOPHAGOSCOPY / UPPER GASTROINTESTINAL TRACT (GI) with cyst gastrostomy, dilation    Diagnosis:Necrotizing pancreatitis [K85.91]  Diagnosis Additional Information: No value filed.    Anesthesia Type:  General    Note:  Disposition: Inpatient   Postop Pain Control: Uneventful            Sign Out: Well controlled pain   PONV: No   Neuro/Psych: Uneventful            Sign Out: Acceptable/Baseline neuro status   Airway/Respiratory: Uneventful            Sign Out: Acceptable/Baseline resp. status   CV/Hemodynamics: Uneventful            Sign Out: Acceptable CV status; No obvious hypovolemia; No obvious fluid overload   Other NRE: NONE   DID A NON-ROUTINE EVENT OCCUR? No           Last vitals:  Vitals Value Taken Time   /52 07/21/21 1450   Temp 36.9  C (98.4  F) 07/21/21 1450   Pulse 109 07/21/21 1453   Resp 20 07/21/21 1450   SpO2 93 % 07/21/21 1453   Vitals shown include unvalidated device data.    Electronically Signed By: Milo Fish MD  July 21, 2021  4:19 PM

## 2021-07-21 NOTE — OR NURSING
Spoke to Dr. Fish via phone, received verbal signout for patient to transfer back to inpatient bed when ready.

## 2021-07-21 NOTE — PROGRESS NOTES
Nephrology Progress Note  07/21/2021         Dax Villareal is a 30 year old male with h/o ETOH hepatitis, end stage liver disease, RADHA on HD, transferred from [facility] in [place] for septic shock on 6/28/21 for treatment of necrotizing pancreatitis and decompensated liver disease. He was initially admitted to [facility] 5/19/21.        Interval History :   Mr Villareal had HD yesterday, pulled 2L of UF, labs stable today.  Going for GI endoscopy, plan for HD tomorrow as long as he is still stable.  + abd pain consistent with pancreatitis, minimal peripheral edema but will continue to challenge EDW.        Assessment & Recommendations:   RADHA-Cr was 0.8 as recently as 5/19/2021 but now HD dependent, started RRT at [facility] prior to transfer to [facility] (exact date unclear but per family it was end of May).  Etiology likely multifactorial with contrast, sepsis/pancreatitis, likely HRS physiology contributing as well.  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  Continuing RRT as long as it is tolerated with regards to BP's, is functional enough to possibly do outpt HD.  MELD is in high 30's so has high 3 month mortality but continuing restorative cares for now.                -Line is TDC from PTA               -Holding on run today, plan for HD tomorrow.        Volume status-Anuric, has abdominal distension and some mild peripheral edema.  Pulled 2L with run yesterday, minimal peripheral edema, NPO today for GI endoscopy.       Electrolytes/pH-K 3.8 and Na 130 after run yesterday, ordered fluid restriction for hyponatremia.        Ca/phos/pth-Ca 8.3, corrects to normal with low albumin.       Anemia-Hgb 6.6, acute management per team, last PRBC's 7/14.       Nutrition-Taking PO, appetite ok.       Discussed with Dr Ryan      Recommendations were communicated to primary team via verbal communication.           SOFIA Marlow CNS  Clinical Nurse Specialist  [phone]    Review of Systems:   I reviewed the  "following systems:  Gen: No fevers or chills  CV: No CP at rest  Resp: No SOB at rest  GI: No N/V    Physical Exam:   I/O last 3 completed shifts:  In: 515 [P.O.:200; Other:30; NG/GT:285]  Out: 2177 [Drains:175; Other:2000; Stool:2]   /49 (BP Location: Right arm)   Pulse 115   Temp 98.2  F (36.8  C) (Oral)   Resp 16   Ht 1.676 m (5' 6\")   Wt 78.5 kg (173 lb 1 oz)   SpO2 100%   BMI 27.93 kg/m       GENERAL APPEARANCE: Mild distress, + abd pain.    EYES:  scleral icterus, pupils equal  Pulmonary: lungs clear to auscultation. Off supplemental oxygen   CV: tachy   - Edema - no LE edema  GI: large distended, tender. Has retroperitoneal drain  MS: no evidence of inflammation in joints, no muscle tenderness  SKIN: no rash, warm, dry, no cyanosis  NEURO: alert/interactive  ACCESS: Right TDC     Labs:   All labs reviewed by me  Electrolytes/Renal - Recent Labs   Lab Test 07/21/21  1133 07/21/21  0550 07/20/21  0601 07/19/21  0632 07/16/21  0616 07/15/21  0443 07/14/21  0345 07/14/21  0345 07/13/21  0718   NA  --  130* 126* 127* 130*  130* 131*  --  130* 131*   POTASSIUM  --  3.8 5.4* 5.1 4.0 3.3*  --  4.2 3.6   CHLORIDE  --  97 94 96 99 98  --  98 98   CO2  --  24 19* 21 20 23  --  20 23   BUN  --  33* 62* 50* 39* 26  --  42* 29   CR  --  2.69* 4.22* 3.47* 3.23* 2.30*  --  3.35* 2.60*   * 110* 103* 83 151* 131*   < > 137* 94   JANENE  --  8.3* 8.6 8.6 7.8* 8.1*  --  8.6 8.5   MAG  --   --   --   --   --  1.6  --  1.6 1.5*   PHOS  --   --   --   --  2.0* 3.0  --  4.6* 4.3    < > = values in this interval not displayed.       CBC -   Recent Labs   Lab Test 07/21/21  0550 07/20/21  0601 07/19/21  0632   WBC 21.7* 24.3* 21.0*   HGB 6.6* 7.0* 7.0*    271 251       LFTs -   Recent Labs   Lab Test 07/21/21  0550 07/20/21  0601 07/19/21  0632   ALKPHOS 175* 180* 190*   BILITOTAL 5.5* 6.3* 6.3*   ALT 15 16 19   AST 47* 50* 63*   PROTTOTAL 6.7* 7.1 6.7*   ALBUMIN 2.1* 2.1* 2.1*       Iron Panel -   Recent Labs "   Lab Test 07/19/21  0632   IRON 31*   IRONSAT 43           Current Medications:    [Auto Hold] amylase-lipase-protease  2 capsule Oral TID w/meals     [Auto Hold] B and C vitamin Complex with folic acid  5 mL Per Feeding Tube Daily     [Auto Hold] ciprofloxacin  250 mg Oral Daily     [Auto Hold] folic acid  1 mg Oral Daily    Or     [Auto Hold] folic acid  1 mg Intravenous Daily     [Auto Hold] HYDROmorphone (STANDARD CONC)  2 mg Oral Once     [Auto Hold] lactulose  20 g Oral or NG Tube 4x Daily     [Auto Hold] melatonin  6 mg Oral At Bedtime     [Auto Hold] pantoprazole  40 mg Oral QAM AC     [Auto Hold] phytonadione  10 mg Intravenous Once     [Held by provider] potassium chloride  20 mEq Oral BID     [Auto Hold] rifaximin  550 mg Oral BID     [Auto Hold] sodium chloride (PF)  10 mL Irrigation Q8H     sodium chloride (PF)  3 mL Intracatheter Q8H     [Auto Hold] vitamin B1  100 mg Oral Daily    Or     [Auto Hold] thiamine  100 mg Intravenous Daily       - MEDICATION INSTRUCTIONS -       dextrose

## 2021-07-21 NOTE — PROCEDURES
[facility]    Procedure: IR Procedure Note    Date/Time: 7/21/2021 9:43 AM  Performed by: Charisma Hinton DO  Authorized by: Shital Mireles MD     UNIVERSAL PROTOCOL   Site Marked: NA  Prior Images Obtained and Reviewed:  Yes  Required items: Required blood products, implants, devices and special equipment available    Patient identity confirmed:  Verbally with patient, arm band, provided demographic data and hospital-assigned identification number  Patient was reevaluated immediately before administering moderate or deep sedation or anesthesia  Confirmation Checklist:  Patient's identity using two indicators, relevant allergies, procedure was appropriate and matched the consent or emergent situation and correct equipment/implants were available  Time out: Immediately prior to the procedure a time out was called    Universal Protocol: the Joint Commission Universal Protocol was followed    Preparation: Patient was prepped and draped in usual sterile fashion    ESBL (mL):  0         ANESTHESIA    Anesthesia: Local infiltration  Local Anesthetic:  Lidocaine 1% without epinephrine  Anesthetic Total (mL):  5      SEDATION    Patient Sedated: No    See dictated procedure note for full details.  Findings: Loculated ascites seen on ultrasound. RLQ pocket was identified and paracentesis was conducted. 60 ml fluid aspirated and sent for labs.    Specimens: fluid and/or tissue for gram stain and culture    Complications: None    Condition: Stable    Plan: Bed rest for 1 hour.    PROCEDURE   Patient Tolerance:  Patient tolerated the procedure well with no immediate complications    Length of time physician/provider present for 1:1 monitoring during sedation: 0

## 2021-07-22 ENCOUNTER — APPOINTMENT (OUTPATIENT)
Dept: GENERAL RADIOLOGY | Facility: CLINIC | Age: 31
End: 2021-07-22
Attending: INTERNAL MEDICINE
Payer: MEDICAID

## 2021-07-22 LAB
ALBUMIN SERPL-MCNC: 2.1 G/DL (ref 3.4–5)
ALP SERPL-CCNC: 156 U/L (ref 40–150)
ALT SERPL W P-5'-P-CCNC: 14 U/L (ref 0–70)
ANION GAP SERPL CALCULATED.3IONS-SCNC: 12 MMOL/L (ref 3–14)
AST SERPL W P-5'-P-CCNC: 46 U/L (ref 0–45)
BILIRUB SERPL-MCNC: 6.1 MG/DL (ref 0.2–1.3)
BUN SERPL-MCNC: 43 MG/DL (ref 7–30)
CALCIUM SERPL-MCNC: 8.4 MG/DL (ref 8.5–10.1)
CHLORIDE BLD-SCNC: 96 MMOL/L (ref 94–109)
CO2 SERPL-SCNC: 21 MMOL/L (ref 20–32)
CREAT SERPL-MCNC: 3.31 MG/DL (ref 0.66–1.25)
ERYTHROCYTE [DISTWIDTH] IN BLOOD BY AUTOMATED COUNT: 17.7 % (ref 10–15)
GFR SERPL CREATININE-BSD FRML MDRD: 23 ML/MIN/1.73M2
GLUCOSE BLD-MCNC: 76 MG/DL (ref 70–99)
HCT VFR BLD AUTO: 26.1 % (ref 40–53)
HGB BLD-MCNC: 8.6 G/DL (ref 13.3–17.7)
INR PPP: 1.77 (ref 0.85–1.15)
INR PPP: 1.79 (ref 0.85–1.15)
LACTATE SERPL-SCNC: 1.9 MMOL/L (ref 0.7–2)
MCH RBC QN AUTO: 31 PG (ref 26.5–33)
MCHC RBC AUTO-ENTMCNC: 33 G/DL (ref 31.5–36.5)
MCV RBC AUTO: 94 FL (ref 78–100)
PHOSPHATE SERPL-MCNC: 6.8 MG/DL (ref 2.5–4.5)
PLATELET # BLD AUTO: 241 10E3/UL (ref 150–450)
POTASSIUM BLD-SCNC: 4 MMOL/L (ref 3.4–5.3)
PROT SERPL-MCNC: 6.8 G/DL (ref 6.8–8.8)
RBC # BLD AUTO: 2.77 10E6/UL (ref 4.4–5.9)
SODIUM SERPL-SCNC: 129 MMOL/L (ref 133–144)
WBC # BLD AUTO: 21.2 10E3/UL (ref 4–11)

## 2021-07-22 PROCEDURE — 99233 SBSQ HOSP IP/OBS HIGH 50: CPT | Performed by: NURSE PRACTITIONER

## 2021-07-22 PROCEDURE — 71045 X-RAY EXAM CHEST 1 VIEW: CPT

## 2021-07-22 PROCEDURE — 85610 PROTHROMBIN TIME: CPT | Performed by: STUDENT IN AN ORGANIZED HEALTH CARE EDUCATION/TRAINING PROGRAM

## 2021-07-22 PROCEDURE — 250N000011 HC RX IP 250 OP 636: Performed by: STUDENT IN AN ORGANIZED HEALTH CARE EDUCATION/TRAINING PROGRAM

## 2021-07-22 PROCEDURE — 99233 SBSQ HOSP IP/OBS HIGH 50: CPT | Mod: GC | Performed by: HOSPITALIST

## 2021-07-22 PROCEDURE — 250N000011 HC RX IP 250 OP 636: Performed by: HOSPITALIST

## 2021-07-22 PROCEDURE — 85027 COMPLETE CBC AUTOMATED: CPT | Performed by: INTERNAL MEDICINE

## 2021-07-22 PROCEDURE — 99233 SBSQ HOSP IP/OBS HIGH 50: CPT | Performed by: INTERNAL MEDICINE

## 2021-07-22 PROCEDURE — 82040 ASSAY OF SERUM ALBUMIN: CPT | Performed by: INTERNAL MEDICINE

## 2021-07-22 PROCEDURE — 250N000013 HC RX MED GY IP 250 OP 250 PS 637: Performed by: INTERNAL MEDICINE

## 2021-07-22 PROCEDURE — 99232 SBSQ HOSP IP/OBS MODERATE 35: CPT | Performed by: PHYSICIAN ASSISTANT

## 2021-07-22 PROCEDURE — 80053 COMPREHEN METABOLIC PANEL: CPT | Performed by: INTERNAL MEDICINE

## 2021-07-22 PROCEDURE — 90937 HEMODIALYSIS REPEATED EVAL: CPT

## 2021-07-22 PROCEDURE — 36592 COLLECT BLOOD FROM PICC: CPT | Performed by: INTERNAL MEDICINE

## 2021-07-22 PROCEDURE — 71045 X-RAY EXAM CHEST 1 VIEW: CPT | Mod: 26 | Performed by: RADIOLOGY

## 2021-07-22 PROCEDURE — 83605 ASSAY OF LACTIC ACID: CPT | Performed by: ORTHOPAEDIC SURGERY

## 2021-07-22 PROCEDURE — 250N000013 HC RX MED GY IP 250 OP 250 PS 637: Performed by: HOSPITALIST

## 2021-07-22 PROCEDURE — 36592 COLLECT BLOOD FROM PICC: CPT | Performed by: STUDENT IN AN ORGANIZED HEALTH CARE EDUCATION/TRAINING PROGRAM

## 2021-07-22 PROCEDURE — 999N000157 HC STATISTIC RCP TIME EA 10 MIN

## 2021-07-22 PROCEDURE — 84100 ASSAY OF PHOSPHORUS: CPT | Performed by: HOSPITALIST

## 2021-07-22 PROCEDURE — 258N000003 HC RX IP 258 OP 636: Performed by: STUDENT IN AN ORGANIZED HEALTH CARE EDUCATION/TRAINING PROGRAM

## 2021-07-22 PROCEDURE — 120N000005 HC R&B MS OVERFLOW UMMC

## 2021-07-22 PROCEDURE — 250N000011 HC RX IP 250 OP 636: Performed by: INTERNAL MEDICINE

## 2021-07-22 PROCEDURE — 5A09357 ASSISTANCE WITH RESPIRATORY VENTILATION, LESS THAN 24 CONSECUTIVE HOURS, CONTINUOUS POSITIVE AIRWAY PRESSURE: ICD-10-PCS | Performed by: PHYSICIAN ASSISTANT

## 2021-07-22 PROCEDURE — 36592 COLLECT BLOOD FROM PICC: CPT | Performed by: ORTHOPAEDIC SURGERY

## 2021-07-22 PROCEDURE — 250N000009 HC RX 250: Performed by: INTERNAL MEDICINE

## 2021-07-22 PROCEDURE — 258N000003 HC RX IP 258 OP 636: Performed by: CLINICAL NURSE SPECIALIST

## 2021-07-22 PROCEDURE — G0463 HOSPITAL OUTPT CLINIC VISIT: HCPCS

## 2021-07-22 PROCEDURE — 85610 PROTHROMBIN TIME: CPT

## 2021-07-22 RX ORDER — ALBUMIN (HUMAN) 12.5 G/50ML
50 SOLUTION INTRAVENOUS ONCE
Status: COMPLETED | OUTPATIENT
Start: 2021-07-23 | End: 2021-07-23

## 2021-07-22 RX ORDER — CEFTRIAXONE 2 G/1
2 INJECTION, POWDER, FOR SOLUTION INTRAMUSCULAR; INTRAVENOUS EVERY 24 HOURS
Status: DISPENSED | OUTPATIENT
Start: 2021-07-22 | End: 2021-07-25

## 2021-07-22 RX ADMIN — HYDROMORPHONE HYDROCHLORIDE 2 MG: 1 SOLUTION ORAL at 02:43

## 2021-07-22 RX ADMIN — FOLIC ACID 1 MG: 1 TABLET ORAL at 13:29

## 2021-07-22 RX ADMIN — HYDROXYZINE HYDROCHLORIDE 50 MG: 25 TABLET, FILM COATED ORAL at 05:21

## 2021-07-22 RX ADMIN — HYDROMORPHONE HYDROCHLORIDE 2 MG: 1 SOLUTION ORAL at 05:52

## 2021-07-22 RX ADMIN — LINEZOLID 600 MG: 100 SUSPENSION ORAL at 19:57

## 2021-07-22 RX ADMIN — Medication 550 MG: at 13:29

## 2021-07-22 RX ADMIN — HYDROMORPHONE HYDROCHLORIDE 2 MG: 1 SOLUTION ORAL at 08:57

## 2021-07-22 RX ADMIN — SODIUM CHLORIDE 300 ML: 9 INJECTION, SOLUTION INTRAVENOUS at 09:42

## 2021-07-22 RX ADMIN — HYDROMORPHONE HYDROCHLORIDE 2 MG: 1 SOLUTION ORAL at 21:46

## 2021-07-22 RX ADMIN — THIAMINE HYDROCHLORIDE 100 MG: 100 INJECTION, SOLUTION INTRAMUSCULAR; INTRAVENOUS at 08:47

## 2021-07-22 RX ADMIN — HYDROXYZINE HYDROCHLORIDE 50 MG: 25 TABLET, FILM COATED ORAL at 19:56

## 2021-07-22 RX ADMIN — PHYTONADIONE 10 MG: 10 INJECTION, EMULSION INTRAMUSCULAR; INTRAVENOUS; SUBCUTANEOUS at 09:12

## 2021-07-22 RX ADMIN — Medication 550 MG: at 19:57

## 2021-07-22 RX ADMIN — HYDROMORPHONE HYDROCHLORIDE 2 MG: 1 SOLUTION ORAL at 13:27

## 2021-07-22 RX ADMIN — SODIUM CHLORIDE 250 ML: 9 INJECTION, SOLUTION INTRAVENOUS at 09:42

## 2021-07-22 RX ADMIN — HYDROMORPHONE HYDROCHLORIDE 2 MG: 1 SOLUTION ORAL at 18:30

## 2021-07-22 RX ADMIN — LINEZOLID 600 MG: 100 SUSPENSION ORAL at 13:28

## 2021-07-22 RX ADMIN — CEFTRIAXONE SODIUM 2 G: 2 INJECTION, POWDER, FOR SOLUTION INTRAMUSCULAR; INTRAVENOUS at 16:46

## 2021-07-22 RX ADMIN — Medication: at 09:42

## 2021-07-22 ASSESSMENT — MIFFLIN-ST. JEOR
SCORE: 1677.75
SCORE: 1677.75

## 2021-07-22 ASSESSMENT — ACTIVITIES OF DAILY LIVING (ADL)
ADLS_ACUITY_SCORE: 19
ADLS_ACUITY_SCORE: 23
ADLS_ACUITY_SCORE: 19

## 2021-07-22 NOTE — PROGRESS NOTES
"[facility]  HEPATOLOGY PROGRESS NOTE  Dax Villareal [id number]       CC:  Alcohol hepatitis  SUBJECTIVE:  States that he has never been told he had evidence of liver disease in the past.  When asked about why he was first admitted to the hospital, he said his pancreas was the problem.  When asked about next steps he states \"I am a fighter\".     ROS:  A 10-point review of systems was negative.    OBJECTIVE:  VS: /62   Pulse 105   Temp 98.2  F (36.8  C) (Oral)   Resp 19   Ht 1.676 m (5' 6\")   Wt 78 kg (171 lb 15.3 oz)   SpO2 96%   BMI 27.75 kg/m     Date 07/22/21 0700 - 07/23/21 0659   Shift 4594-6650 5883-1817 2479-6091 24 Hour Total   INTAKE   P.O. 100   100   NG/GT 30   30   Shift Total(mL/kg) 130(1.67)   130(1.67)   OUTPUT   Drains 25   25   Shift Total(mL/kg) 25(0.32)   25(0.32)   Weight (kg) 78 78 78 78     General: In no acute distress, mild facial muscle wasting  Neuro: AOx3, No asterixis  Lymph: No cervical lymphadenopathy  HEENT:  moderate scleral icterus, Nooral lesions  CV:. Skin warm and dry  Lungs:Respirations even and nonlabored on room air  Abd:  Patient refused abdominal evaluation- has mid abdomen PEG tube, mildly distended abd.    Extrem: Noperipheral edema   Skin: mild jaundice, pallor or bruising  Psych: flat    MEDICATIONS:  Current Facility-Administered Medications   Medication     0.9% sodium chloride BOLUS     0.9% sodium chloride BOLUS     acetaminophen (TYLENOL) solution 325 mg     alteplase (CATHFLO ACTIVASE) injection 2 mg     alteplase (CATHFLO ACTIVASE) injection 2 mg     sodium bicarbonate tablet 325 mg    And     amylase-lipase-protease (CREON 24) 03782-02952 units per EC capsule 1 capsule     amylase-lipase-protease (CREON 24) 60561-52017 units per EC capsule 2 capsule     B and C vitamin Complex with folic acid (NEPHRONEX) liquid 5 mL     benzonatate (TESSALON) capsule 100 mg     calcium carbonate (TUMS) chewable tablet 500 mg     cefTRIAXone (ROCEPHIN) 1 g vial to " attach to  mL bag for ADULTS or NS 50 mL bag for PEDS     Daily 2 GRAM acetaminophen limit, unless fulminent liver failure or transaminases greater than or equal to 300 - 400, then none     dextrose 10% infusion     glucose gel 15-30 g    Or     dextrose 50 % injection 25-50 mL    Or     glucagon injection 1 mg     folic acid (FOLVITE) tablet 1 mg    Or     folic acid injection 1 mg     HYDROmorphone (STANDARD CONC) (DILAUDID) oral solution 1-2 mg     HYDROmorphone (STANDARD CONC) (DILAUDID) oral solution 2 mg     hydrOXYzine (ATARAX) tablet 25 mg    Or     hydrOXYzine (ATARAX) tablet 50 mg     lactulose (CHRONULAC) solution 20 g     lactulose (CHRONULAC) solution 20 g    Or     lactulose (CHRONULAC) solution for enema prep 100 g     linezolid (ZYVOX) suspension 600 mg     melatonin tablet 6 mg     midodrine (PROAMATINE) tablet 10 mg     naloxone (NARCAN) injection 0.2 mg    Or     naloxone (NARCAN) injection 0.4 mg    Or     naloxone (NARCAN) injection 0.2 mg    Or     naloxone (NARCAN) injection 0.4 mg     ondansetron (ZOFRAN-ODT) ODT tab 4 mg    Or     ondansetron (ZOFRAN) injection 4 mg     ondansetron (ZOFRAN) injection 4 mg     pantoprazole (PROTONIX) 2 mg/mL suspension 40 mg     phytonadione (AQUA-MEPHYTON) 10 mg in sodium chloride 0.9 % 50 mL intermittent infusion     polyethylene glycol (MIRALAX) Packet 17 g     [Held by provider] potassium chloride (KLOR-CON) Packet 20 mEq     prochlorperazine (COMPAZINE) injection 5 mg     promethazine (PHENERGAN) Suppository 12.5 mg     promethazine (PHENERGAN) tablet 25 mg     rifaximin (XIFAXAN) oral suspension 550 mg     sodium chloride (PF) 0.9% PF flush 10 mL     sodium chloride (PF) 0.9% PF flush 10 mL     sodium chloride (PF) 0.9% PF flush 10 mL     sodium chloride (PF) 0.9% PF flush 9 mL     sodium chloride (PF) 0.9% PF flush 9 mL     thiamine (B-1) tablet 100 mg    Or     thiamine (B-1) injection 100 mg       REVIEW OF LABORATORY, PATHOLOGY AND IMAGING  RESULTS:  BMP  Recent Labs   Lab 07/22/21  0552 07/21/21  1713 07/21/21  1133 07/21/21  0550 07/20/21  0601 07/19/21  0632   *  --   --  130* 126* 127*   POTASSIUM 4.0  --   --  3.8 5.4* 5.1   CHLORIDE 96  --   --  97 94 96   JANENE 8.4*  --   --  8.3* 8.6 8.6   CO2 21  --   --  24 19* 21   BUN 43*  --   --  33* 62* 50*   CR 3.31*  --   --  2.69* 4.22* 3.47*   GLC 76 98 108* 110* 103* 83     CBC  Recent Labs   Lab 07/22/21  0552 07/21/21  0550 07/20/21  0601 07/19/21  0632   WBC 21.2* 21.7* 24.3* 21.0*   RBC 2.77* 2.13* 2.24* 2.26*   HGB 8.6* 6.6* 7.0* 7.0*   HCT 26.1* 20.5* 21.2* 21.7*   MCV 94 96 95 96   MCH 31.0 31.0 31.3 31.0   MCHC 33.0 32.2 33.0 32.3   RDW 17.7* 18.7* 18.6* 19.7*    250 271 251     INR  Recent Labs   Lab 07/22/21  0552 07/21/21  1608 07/21/21  0550 07/20/21  0601   INR 1.79* 1.67* 1.75* 1.75*     LFTs  Recent Labs   Lab 07/22/21  0552 07/21/21  0550 07/20/21  0601 07/19/21  0632   ALKPHOS 156* 175* 180* 190*   AST 46* 47* 50* 63*   ALT 14 15 16 19   BILITOTAL 6.1* 5.5* 6.3* 6.3*   PROTTOTAL 6.8 6.7* 7.1 6.7*   ALBUMIN 2.1* 2.1* 2.1* 2.1*        Imaging Results:  CT abd w 7/18/21  IMPRESSION:  1. Continued sequelae of necrotizing pancreatitis with the 15 x 6 cm  peripancreatic walled off necrosis predominantly involving the lesser  sac with drain in place. Enhancement of the residual viable pancreatic  tissue in the uncinate, body, head, and the majority of the tail.  2. Poor parenchymal enhancement of the kidneys suggesting ATN.  3. Moderate amount of ascites with diffuse peritoneal enhancement and  dependant debris in the pelvis. Peritonitis cannot be ruled out.  4. Right pleural effusion with associated compressive atelectasis.  Trace left pleural effusion.    IMPRESSION:  Dax Villareal is a 30 year old male with severe alcohol hepatitis, last use prior to admission (5/2021) complicated by severe necrotizing pancreatitis, RADHA on HD, malnutrition, debility who was transferred for  septic shock requiring pressors related to his presumed infected necrotic collection. He was able to be transferred out of the ICU and now off pressors. His necrotic collection now has walled off and he underwent cystgastrostomy on 7/21.    RECOMMENDATIONS:  1. Severe alcohol hepatitis  2. Secondary peritonitis  3.  Necrotizing pancreatitis  4.  Malnutrition  5.  Debility  6.  RADHA on HD  -On admission Mr. Villareal did have severe hepatomegaly consistent with severe alcohol hepatitis.  He has not had a liver biopsy to confirm cirrhosis and would not recommend this at this time.  He did have evidence of esophageal varices which would suggest more advanced fibrosis/cirrhosis.  Recent CT imaging has normal appearance to the liver.   -He has had improvement in his bilirubin since admission.  Suspect he may have advanced fibrosis (stage III-stage IV) but given severe hepatitis, this will more clearly be assessed at a proximally 6 months post injury.   -SAAG on ascites fluid is less than 1.1 and he does have high protein on the fluid.  Past lipase on fluid has been low.  He now has evidence of walled off necrosis.  Ascites appears to be more related to peritonitis secondary to be severe necrotizing pancreatitis.  -He is not a liver transplant candidate.  His necrotizing pancreatitis would limit any transplant opportunities.  He also remains significantly malnourished and debilitated.  His current elevated liver tests may also be secondary to his current pancreatitis which would not improve with the liver transplant.  -With his current need for hemodialysis, malnutrition, debility and pancreatitis, he is at high risk for further decompensating events, including infections and bleeding.  His mortality risk remains high.  I did communicate to him that transplant is not an opportunity for him and about his high mortality risk.    Patient was discussed with attending physician, Dr. Singleton    He will continue to be followed by our  advanced endoscopy colleagues.    Thank you for the opportunity to be involved with  Dax Villareal [facility]. Please call with any questions or concerns.    SOFIA Jeffers, CNP  Inpatient Hepatology [facility]  Text Link

## 2021-07-22 NOTE — PLAN OF CARE
Neuro: A&Ox4. Intermittent anxiety. Atarax given x1.   Cardiac: ST, 100s. BP 90-100s/30-50s. See previous note. Afebrile.    Respiratory: Sats stable on RA. Pt requests oxymask for comfort. Cpap worn for 2 hours, pt then refused to wear.   GI/: Anuric, on HD. No BM this shift.   Diet/appetite: Clear liquid diet. G/J tube, G to gravity, J clamped.   Activity:  Lift assist. Independently weight shifting. Refuses side to side repositioning.   Pain: At acceptable level on current regimen of PRN dilaudid.   Skin: No new deficits noted.  LDA's: L PICC. R CVC. L abdominal drain.     Plan: HD this AM. Continue with POC. Notify primary team with changes.

## 2021-07-22 NOTE — PLAN OF CARE
Neuro: A&Ox4. Atarax given for anxiety PRN  Cardiac: ST. -110s. SBP stable. Afebrile. VSS.   Respiratory: Sating>90% on RA. Pt requesting to have oxygen for comfort. Plan for CPAP tonight.   GI/: Pt on HD, plan for run tomorrow.  BM X1  Diet/appetite: Post procedure, pt on clears. Per ronald team, to remain on clears this evening and hold off on cycled TF unless otherwise told.  Activity:  Assist of 2, repositioned in bed as pt allows.  Pain: Dilaudid PRN given for abdominal pain. At acceptable level on current regimen.   Skin: No new deficits noted.  LDA's: L PICC     Hgb rechecked this evening 8.7. IV phytonadione given x1. Pt started on ceftriaxone and zyvox. Will continue to monitor.     Plan: Continue with POC. Notify primary team with changes.

## 2021-07-22 NOTE — PROGRESS NOTES
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  -Apply THIN layer of Critic-Aid Thick Moisture Barrier Paste to all skin that is in contact with stool or urine.  -With repeat cleansings, DO NOT scrub Critic-Aid down to skin, just gently remove surface incontinence soil and reapply additional Critic Aid, if needed.  -If complete removal of Critic-Aid is required, use a warm wash cloth and Samson cleanser: place the warm wash cloth to the area for a minute and then cleanse, or use mineral oil/baby oil and soft disposable wash cloth.  .    Avoid brief or pull-up in bed, use only bed underpad.      Orders Reviewed  WO Nurse follow-up plan:weekly  Nursing to notify the Provider(s) and re-consult the [facility] Nurse if wound(s) deteriorates or new skin concern.    Patient History  According to provider note(s):  30 year old male with PMHx significant for alcohol use disorder who has had a prolonged hospitalization at [facility] in [place] for acute alcohol hepatitis/end stage liver disease complicated by hepatic encephalopathy, acute renal failure requiring hemodialysis, acute hypoxemic respiratory failure and septic shock secondary to acute necrotizing pancreatitis. Transferred to [facility] ICU on 2021 for further management of necrotizing pancreatitis by interventional GI, possible necrosectomy.    Objective Data  Containment of urine/stool: Incontinence Protocol and Incontinent pad in bed    Active Diet Order  Orders Placed This Encounter      Clear Liquid Diet    Output:   I/O last 3 completed shifts:  In: 2371 [P.O.:270; I.V.:600; Other:720; NG/GT:210]  Out: 265  [Drains:265]    Risk Assessment:   Sensory Perception: 3-->slightly limited  Moisture: 3-->occasionally moist  Activity: 2-->chairfast  Mobility: 2-->very limited  Nutrition: 2-->probably inadequate  Friction and Shear: 2-->potential problem  Tyrel Score: 14                          Labs:   Recent Labs   Lab 21  0552   ALBUMIN 2.1*   HGB 8.6*   INR 1.79*   WBC 21.2*       Physical Exam  Areas of skin assessed: focused buttocks     Wound Location:  Base of  cleft, inferior to coccyx    Date of last photo 21(photo didn't save )  Wound History: present on admission.  Pt has been receiving lactulose, previously with rectal tube with balloon.    Wound Base: 100 % dermis     Palpation of the wound bed: normal      Drainage: none     Description of drainage: none     Measurements (length x width x depth, in cm) 0.5  x 0.1  x  <0.1 cm almost resurfaced   Periwound skin: erythema- blanchable      Temperature: normal   Odor: none  Pain: no grimacing or signs of discomfort,     Interventions  Visual inspection and assessment completed   Wound Care Rationale Provide protection   Wound Care: done per plan of care  Supplies: gathered  Current off-loading measures: Pillows  Current support surface: Standard  Low air loss mattress with pulsation   Discussed plan of care with Nurse    Kathy Marsh RN CWOCN

## 2021-07-22 NOTE — PROGRESS NOTES
[facility] PROGRESS NOTE     Patient:  Dax Villareal   Date of birth 1990, Medical record number [id number]  Date of Visit:  07/22/2021  Date of Admission: 6/28/2021  Consult Requester:Patricia Baker, *          Assessment and Plan:   RECOMMENDATIONS:   1. Continue ceftriaxone 2g daily and linezolid 600mg q12h for SBP treatment  2. Follow paracetesis cx collected 7/21   3. Hold Cipro while on SBP treatment  4. Agree with additional evaluation of right lung opacitiy       ASSESSMENT:  Dax Villareal is a 30 year old male with PMHx significant for alcohol use disorder who was admitted to [facility] in [place], [place] for abdominal pain, nausea and vomiting, found to have end stage liver disease c/b hepatic encephalopathy, acute renal failure requiring iHD, SBP and acute necrotizing pancreatitis.      Afebrile on admission, however has spiked some low grade intermittent fevers up to 100.9 on 7/1-2 then defervesced. WBC significantly elevated on admission to 50K with slow downtrend over the past few weeks, currently 17K. Lactate intermittently elevated into the 2-3 range, otherwise wnl. Infectious work up since admission to [facility] has remained negative. BCx negative. Drain placement pancreatic cultures collected 6/30 NGTD/negative. 7/2 Ascites fluid cultures negative, labs included WBC 2663/74% neutrophils. Repeat ascites fluid cultures 7/5 with 1801 WBC/61% neutrophils, cultures negative. 7/13 ascites fluid cultures NGTD. See imaging section below for review.      Started on meropenem (6/29-7/18), micafungin (6/29-7/1), and given 1x dose of IV Vancomycin (6/29). On Rifaximin. He has now completed 4-5 days following replacement of perc drain for a total of 21 days of broad spectrum antibiotics s/p initial perc drain placement for necrotizing pancreatitis with pseudocyst. Cultures remain largely negative with exception of VRE (see below). As such transitioned to Cipro for SBP  prophylaxis and monitoring off of broad abx.     However, paracentesis on 7/21 revealed 453 WBC, 71% neutrophils. WBC count overall is not much increased from prior studies, but with higher % of neutrophils elected to start treatment for peritonitis. As he has just completed a course of meropenem and does not have known history of ESBL organisms, would use ceftriaxone for empiric SBP coverage. Would also treat with linezolid for VRE coverage given prior VRE isolated at time of abdominal drain exchange as noted below. Antibiotics can be adjusted based on ascites fluid culture if needed. So far NGTD.    Of note, Abscess culture collected 7/14 from his re-positioned drain with growth of VRE. When the culture was collected he was clinically stable and there was no evidence of a new infection with improvement in WBC count noted on serial ascites studies. As such, elected not to treat VRE as it was felt to most likely be colonization having been selected with prolonged meropenem coverage.        Thank you for this consult. ID will continue to follow.      Rosa Elena Horan PA-C  Pronouns: she/her/hers  Infectious Diseases  Pager: [phone]  07/22/2021           Interim History and Events:   Afebrile. Following procedure up to 10L O2 now weaned back down to RA.  Dax reports feeling more SOB this morning. Currently wearing oxymask. Reports he is too SOB to talk much. Abdomen feels ok. Reports procedures went well yesterday.          HPI:   Dax Villareal is a 30 year old male with PMHx significant for alcohol use disorder who was admitted to [facility] in [place], [place] for abdominal pain, nausea and vomiting, found to have end stage liver disease c/b hepatic encephalopathy, acute renal failure requiring iHD, SBP and acute necrotizing pancreatitis.      He required respiratory support (BIPAP, ? Intubation) for respiratory failure and appeared to be in sepsis (lactate reported at 7.7 with abnormal LFTs). He was  "transferred to [facility] for prolonged admission and reportedly treated with steroids; kidney function and pancreatic enzymes normal on admission). Length of stay stated >40 days. CT imaging 6/19 with concern for necrotizing pancreatitis with follow up CT scan 6/25 with peripancreatic fluid collection and 9 cm soft tissue/hemorrhagic structure c/f internal hemorrhage of a pseudocyst. Given multiple antibiotics (Cefepime, Flagyl, Ciprofloxacin, micafungin). Hospital course reportedly complicated by hypoxemic respiratory failure requiring BIPAP, ARF requiring iHD, hepatic encephalopathy, and septic shock 2/2 necrotizing pancreatitis/pancreatic pseudocyst requiring pressor support. Ultimately transferred to [facility] for interventions 6/28. Admitted to the ICU.      Now status post perc IR drain placement 6/29 with cultures that remain NGTD/negative. No endoscopic drainage by GI was pursued given improvement in imaging 7/11. Additionally has had 3 paracenteses (7/2, 7/5, and 7/13 with negative culture work up. Had PEGJ placed 7/13.      He is an outdoorsman. Picks mushrooms, goes fishing/hunting, and hiking. Has not been since \"last spring\". Denies feeling ill prior to admission to OSH. Feeling fairly well currently although notes diffuse abdominal pain. Has had multiple procedures within the past few days and states he feels like he needs a \"rest day\". Denies fever/chills. No URI sx. Is having diarrhea, but on lactulose. Anuric.          ROS:   -Focused 4 point ROS completed, pertinent positives and negatives listed above.    Physical Examination:  Temp: 98  F (36.7  C) Temp src: Oral BP: 101/47 Pulse: 108   Resp: 16 SpO2: 92 % O2 Device: None (Room air) Oxygen Delivery: 4 LPM    Vitals:    07/17/21 0500 07/18/21 0500 07/18/21 1430 07/20/21 0800   Weight: 78.2 kg (172 lb 6.4 oz) 78.5 kg (173 lb 1 oz) 77.5 kg (170 lb 13.7 oz) 78.5 kg (173 lb 1 oz)    07/22/21 0350   Weight: 78 kg (171 lb 15.3 oz)       Patient " declined physical exam 7/22.    Constitutional: Adult male patient seen lying in bed, in NAD.  HEENT: NCAT, icterus, conjunctiva clear. Oxymask in place  Respiratory: shallow breaths, rate slightly increased   Skin: Warm and dry. Jaundiced. No rashes, scattered petechiae on torso.  Musculoskeletal: Extremities grossly normal. No tenderness or edema present.   Neurologic: speech normal, answering questions appropriately. Moves all extremities spontaneously. Grossly non-focal.  Neuropsychiatric: Mentation and affect normal/appropriate, somewhat flat.  VAD: CVC is c/d/i with no erythema, drainage, or tenderness.      Medications:    sodium chloride 0.9%  250 mL Intravenous Once in dialysis/CRRT     sodium chloride 0.9%  300 mL Hemodialysis Machine Once     amylase-lipase-protease  2 capsule Oral TID w/meals     B and C vitamin Complex with folic acid  5 mL Per Feeding Tube Daily     cefTRIAXone  1 g Intravenous Q24H     ciprofloxacin  250 mg Oral Daily     folic acid  1 mg Oral Daily    Or     folic acid  1 mg Intravenous Daily     HYDROmorphone (STANDARD CONC)  2 mg Oral Once     lactulose  20 g Oral or NG Tube 4x Daily     linezolid  600 mg Oral Q12H LENKA     melatonin  6 mg Oral At Bedtime     - MEDICATION INSTRUCTIONS -   Does not apply Once     pantoprazole  40 mg Oral QAM AC     phytonadione  10 mg Intravenous Once     phytonadione  10 mg Intravenous Once     [Held by provider] potassium chloride  20 mEq Oral BID     rifaximin  550 mg Oral BID     sodium chloride (PF)  10 mL Irrigation Q8H     sodium chloride (PF)  9 mL Intracatheter During Dialysis/CRRT (from stock)     sodium chloride (PF)  9 mL Intracatheter During Dialysis/CRRT (from stock)     vitamin B1  100 mg Oral Daily    Or     thiamine  100 mg Intravenous Daily       Infusions/Drips:    - MEDICATION INSTRUCTIONS -       dextrose         Laboratory Data:   Inflammatory Markers    Recent Labs   Lab Test 10/06/20  0927   CRP 16.9*       Metabolic Studies        Recent Labs   Lab Test 07/22/21  0552 07/21/21  1713 07/21/21  1133 07/21/21  0550 07/20/21  1423 07/20/21  0601 07/19/21  1314 07/19/21  0632 07/18/21  0637 07/17/21 2031 07/17/21 2031 07/17/21  0506 07/16/21  0616 07/15/21  0811 07/15/21  0443 07/14/21  1945 07/14/21  0345   *  --   --  130*  --  126*  --  127* 128*  --  127*  --  130*  130*  --  131*  --  130*   POTASSIUM 4.0  --   --  3.8  --  5.4*  --  5.1 5.6*  --  5.1  --  4.0  --  3.3*  --  4.2   CHLORIDE 96  --   --  97  --  94  --  96 97  --  97  --  99  --  98  --  98   CO2 21  --   --  24  --  19*  --  21 20  --  20  --  20  --  23  --  20   ANIONGAP 12  --   --  9  --  13  --  10 11  --  10  --  11  --  10  --  12   BUN 43*  --   --  33*  --  62*  --  50* 68*  --  57*  --  39*  --  26  --  42*   CR 3.31*  --   --  2.69*  --  4.22*  --  3.47* 4.46*  --  4.08*  --  3.23*  --  2.30*  --  3.35*   GFRESTIMATED 23*  --   --  30*  --  18*  --  22* 16*  --  18*  --  24*  --  36*  --  23*   GLC 76 98 108* 110*  --  103*  --  83 164*  --  142*  --  151*  --  131*   < > 137*   JANENE 8.4*  --   --  8.3*  --  8.6  --  8.6 8.4*  --  8.3*  --  7.8*  --  8.1*  --  8.6   PHOS 6.8*  --   --   --   --   --   --   --   --   --   --   --  2.0*  --  3.0  --  4.6*   MAG  --   --   --   --   --   --   --   --   --   --   --   --   --   --  1.6  --  1.6   LACT  --   --   --   --  1.4  --  1.6  --   --    < >  --    < >  --    < >  --   --   --     < > = values in this interval not displayed.       Hepatic Studies    Recent Labs   Lab Test 07/22/21  0552 07/21/21  0550 07/20/21  0601 07/19/21  0632 07/18/21  0637 07/17/21  0506   BILITOTAL 6.1* 5.5* 6.3* 6.3* 7.4* 7.5*   ALKPHOS 156* 175* 180* 190* 242* 175*   ALBUMIN 2.1* 2.1* 2.1* 2.1* 2.2* 2.3*   AST 46* 47* 50* 63* 128* 87*   ALT 14 15 16 19 29 20       Pancreatitis testing    Recent Labs   Lab Test 06/29/21  0410 06/28/21  1839 05/18/21  1220 12/09/20  1504 10/06/20  0927   LIPASE  --  199 381 140 139   TRIG  Unsatisfactory specimen - icteric Canceled, Test credited  --   --   --        Hematology Studies      Recent Labs   Lab Test 07/22/21  0552 07/21/21  1608 07/21/21  0550 07/20/21  0601 07/19/21  0632 07/18/21  0637 07/17/21  0506 06/29/21  0410 06/28/21  1839 05/18/21  1220 12/09/20  1504 10/06/20  0927   WBC 21.2*  --  21.7* 24.3* 21.0* 25.2* 27.3*   < > 50.6* 13.2* 4.2 6.9   ANEU  --   --   --   --   --   --   --   --  44.0* 12.0* 2.1 5.2   ALYM  --   --   --   --   --   --   --   --  0.0* 0.6* 1.3 0.8   MEDHAT  --   --   --   --   --   --   --   --  2.0* 0.4 0.6 0.8   AEOS  --   --   --   --   --   --   --   --  0.5 0.0 0.2 0.1   HGB 8.6* 8.7* 6.6* 7.0* 7.0* 7.5* 7.3*   < > 7.6* 16.6 17.2 18.8*   HCT 26.1*  --  20.5* 21.2* 21.7* 22.6* 22.3*   < > 23.4* 44.8 47.7 51.2     --  250 271 251 251 225   < > 127* 222 299 311    < > = values in this interval not displayed.       Arterial Blood Gas Testing    Recent Labs   Lab Test 07/01/21  0014 06/30/21  1716 06/28/21  1839   O2PER 60.0 5L 2L        Urine Studies     Recent Labs   Lab Test 12/09/20  1817 10/06/20  1214   URINEPH 7.0 7.0   NITRITE Negative Negative   LEUKEST Negative Negative   WBCU 0 1       Vancomycin Levels     Recent Labs   Lab Test 06/30/21  0700   VANCOMYCIN 31.2*       Microbiology:  Culture Micro   Date Value Ref Range Status   07/05/2021 No growth  Final   07/05/2021 Culture negative after 2 weeks  Preliminary   07/02/2021 No growth  Final   07/02/2021 No growth  Final   07/02/2021 No growth  Final   06/30/2021 No growth  Final   06/30/2021 No anaerobes isolated  Final   06/29/2021 No growth  Final   06/29/2021 No growth  Final   05/18/2021 No growth after 6 days  Final   05/18/2021 No growth after 6 days  Final       Last check of C difficile  C Difficile Toxin B by PCR   Date Value Ref Range Status   07/17/2021 Negative Negative Final     Comment:     A negative result does not exclude actual disease due to C. difficile and may be due to  improper collection, handling and storage of the specimen or the number of organisms in the specimen is below the detection limit of the assay.       Imagin/22 CXR  IMPRESSION:   Increase in right-sided opacity. This could represent pleural  effusion, infection, or edema.     CXR  IMPRESSION: Increased haziness of right lower lung field. Possible  effusion and/or pneumonia versus atelectasis. Recommend follow-up to  Clearing.     CXR  IMPRESSION: Unchanged mixed interstitial and airspace opacities most  prominent in the perihilar and bibasilar regions.  1.  Stable support equipment.     CT AP w/o contrast   IMPRESSION:   1. Sequela of necrotizing pancreatitis with highly decreased size of  walled off necrosis centered in the lesser sac. Percutaneous drain  remains in place.  2. Loculated moderate ascites is unchanged.  3. Hepatomegaly, hepatic steatosis, and evidence of portal  hypertension again seen.  4. Small bilateral pleural effusions are slightly improved.      CT AP w/o contrast   IMPRESSION:   1. Status post placement of a left lateral approach drainage catheter,  substantially decreased but persistent hemorrhagic walled off necrosis  centered in the lesser sac. A smaller collection along the inferior  aspect of the third portion of the duodenum is minimally changed.  2. Minimally changed moderate to large volume loculated ascites.  3. Hepatomegaly and hepatic steatosis with evidence of cirrhosis and  portal hypertension.  4. Diffuse edematous wall thickening of the small and large bowel,  likely reactive. No bowel obstruction.  5. Small right and trace left pleural effusions with adjacent  compressive atelectasis, similar to prior. Small pericardial effusion.  6. Faint patchy ground glass opacities in the right middle lobe and  and right upper lobe, likely infectious/inflammatory.      CT AP w/ contrast   IMPRESSION:   1.  Sequela of necrotizing pancreatitis with peripancreatic  necrotic  fluid collection, large volume abdominal ascites and mesenteric edema.  2.  Thickened, edematous small bowel wall is likely due to reactive  enteritis secondary to pancreatitis.  3.  Hepatomegaly with steatosis and mild surface nodularity,  associated with splenomegaly and recanalized paraumbilical vein,  suggestive of cirrhosis with portal hypertension.  4.  Enlarged hypoenhancing kidneys with loss of corticomedullary  differentiation, suggestive of acute renal parenchymal disease. No  hydronephrosis or perinephric fat stranding.  5.  Small right and trace left pleural effusions, interlobular septal  thickening, and diffuse anasarca, likely related to fluid third  spacing.

## 2021-07-22 NOTE — PROGRESS NOTES
CLINICAL NUTRITION SERVICES - REASSESSMENT NOTE     Nutrition Prescription    RECOMMENDATIONS FOR MDs/PROVIDERS TO ORDER:  Encourage oral intake, encourage use of creon with meals     Malnutrition Status:    Severe malnutrition in the context of acute on chronic illness     Recommendations already ordered by Registered Dietitian (RD):  Continue TF order: Vital 1.5 @ 70 mL/hr x6 hours (12pm-6pm) = 420 mL, 630 kcal (9 kcal/kg), 28 g protein, 320 mL free water, 45 g CHO, 24 g fat     Future/Additional Recommendations:  Monitor oral intake, use of supplements   Monitor ability to increase TF provisions pending patient's acceptance to 12 hr cycle :  Vital 1.5 Leno @ goal of  70ml/hr x 12 hours  (840ml/day)  will provide: 1260 kcals (18 kcal/kg), 57 g PRO (0.8 g/kg), 641 ml free H20, 157 g CHO, and 5 g fiber daily.    Also consider renal formula pending K+/Po4 levels. To meet 100% of needs  N[employer] Renal @ 50 mL/hr (1200 mL/day) to provide 2400 kcals (34 kcal/kg/day), 109 g PRO (~1.5 g/kg/day), 864 mL H2O, 220 CHO and 0 gm Fiber daily, 120 g fat, 29 mEq K+, 982 mg Po4.     EVALUATION OF THE PROGRESS TOWARD GOALS   Diet: Clear Liquid + 1200 mL fluid restriction + Ensure Clear PRN   Calorie count 7/20-7/22  Intake:   7/21       Total Kcals: 0           Total Protein: 0g- 1 meal ordered   7/20       Total Kcals: 0           Total Protein: 0g- 1 meal ordered     Nutrition Support (7/19): Vital 1.5 @ 70 mL/hr x6 hours (12pm-6pm) = 420 mL, 630 kcal (9 kcal/kg), 28 g protein, 320 mL free water, 45 g CHO, 24 g fat     7/13-7/19: Vital 1.5 Leno @ goal of  65ml/hr (1560ml/day)  will provide: 2340 kcals (33 kcal/kg), 105 g PRO (~1.5), 1191 ml free H20, 291 g CHO, and 9 g fiber daily, 89 g fat, 88 mEq K+, 1951 mg Po4      Intake: Average enteral nutrition received 7/15-7/21 provided 739 ml/day, 1108 kcal (15 kcal/kg) and 50 g protein (0.7 g/kg)  Patient refused tube feeding 7/20 and TF held 7/21     NEW FINDINGS   Paracentesis and  cystogastrostomy completed .   Patient did have tube feeding running at 70 mL/hr- patient did not want to talk with writer today, just wanted to rest.     Weight Trends:  21 0900 78 kg (171 lb 15.3 oz)   21 0350 78 kg (171 lb 15.3 oz)   21 0800 78.5 kg (173 lb 1 oz)   21 1430 77.5 kg (170 lb 13.7 oz)   21 0500 78.5 kg (173 lb 1 oz)   21 0500 78.2 kg (172 lb 6.4 oz)   21 0546 74 kg (163 lb 2.3 oz)   07/15/21 0016 73.5 kg (162 lb 0.6 oz)   21 0010 68.2 kg (150 lb 5.7 oz)   21 69.5 kg (151 lb 0.2 oz)   21 80.2 kg (176 lb 12.9 oz) - admission weight      GI: Last bowel movement, , 3-10 stools documented   Renal: HD today, 2500 mL removed.   Skin: WOCN following, Incontinence associated dermatitis in dennis cleft with partial thickness open area - Status: improving  Labs: 129 (L), Creatinine 3.31 (H), Po4 6.8 (H) Po4 on  2.0 (L), fecal elastase  162 (L)  Medications: Creon (refusing), folic acid, Lactulose (refusing), K+ chloride, Thiamine     MALNUTRITION  % Intake: Unable to assess  % Weight Loss: > 5% in 1 month (severe)   Subcutaneous Fat Loss: Facial region:  moderate  Muscle Loss: Temporal: Severe, Scapular bone: moderate, Thoracic region (clavicle, acromium bone, deltoid, trapezius, pectoral): moderate, Upper arm (bicep, tricep): moderate, Dorsal hand: moderate, Upper leg (quadricep, hamstring): moderate and Posterior calf: moderate,  Fluid Accumulation/Edema: None noted  Malnutrition Diagnosis: Severe malnutrition in the context of acute on chronic illness     Previous Goals   Total avg nutritional intake to meet a minimum of 25 kcal/kg and 1.3 g PRO/kg daily (per dosing wt 69.5 kg).   Evaluation: Not met    Previous Nutrition Diagnosis  Inadequate oral intake related to decreased appetite, inability to take adequate PO and increased metabolic demand as evidenced by intakes 50% or less with multiple days of no intakes documented, AMS 2/2  liver disease with placement of  PEG-J for nutrition support and increased energy/protein demand 2/2 liver disease and dialysis  Evaluation: No change    CURRENT NUTRITION DIAGNOSIS  Inadequate oral intake related to decreased appetite, inability to take adequate PO and increased metabolic demand as evidenced by intakes 50% or less with multiple days of no intakes documented, AMS 2/2 liver disease with placement of  PEG-J for nutrition support and increased energy/protein demand 2/2 liver disease and dialysis    INTERVENTIONS  Implementation  Collaboration with other providers  Enteral Nutrition - continue     Goals  Total avg nutritional intake to meet a minimum of 25 kcal/kg and 1.3 g PRO/kg daily (per dosing wt 69.5 kg).    Monitoring/Evaluation  Progress toward goals will be monitored and evaluated per protocol.    Jennifer Castillo RD, LD  6B pager: [phone]

## 2021-07-22 NOTE — PROGRESS NOTES
Resident/Fellow Attestation   I, Clarissa Cleevland, was present with the medical/NAHUM student who participated in the service and in the documentation of the note.  I have verified the history and personally performed the physical exam and medical decision making.  I agree with the assessment and plan of care as documented in the note.      Clarissa Cleveland MD  PGY1  Date of Service (when I saw the patient): 07/22/21    [facility]    Progress Note - [place] 3 Service        Date of Admission:  6/28/2021    Assessment & Plan   Dax Villareal is a 30 year old male with hx of alcohol use disorder admitted on 6/28/2021 after recent prolonged admission at [facility] in [place] who has severe acute alcoholic hepatitis c/b HE, ESRD requiring HD, and malnutrition in the setting of acute necrotizing pancreatitis, s/p PEG-J placement on 7/13. He arrived with acute respiratory failure and septic shock that have resolved, and his SBP has been appropriately treated. He requires continued management for ESRD, hepatic encephalopathy, infected pancreatic pseudocyst, nutritional support via PEG-J, and secondary bacterial peritonitis.     Changes today:   - Dialysis today   - Repeat CXR today  - On Ceftriaxone + Linezolid for secondary bacterial peritonitis  - Discontinued cipro       #Necrotizing pancreatitis w/ infected pseudocyst s/p RP Drain and Cystogastrostomy, improving  #Septic Shock, resolved   #Bacterial Peritonitis   #Leukocytosis, stable  #Peripancreatic fluid collection growing VRE   Etiology of the septic shock that he came in with likely due to necrotizing pancreatitis, infected pseudocyst, and peritonitis. Had percutaneous RP drain placed for drainage of pancreatic collection. Diagnostic para returned with SBP. Repeat CT 7/11 showed significant decrease in size of walled off necrosis and unchanged moderate ascites. S/p therapeutic paracentesis 7/13 (1L removed).      Per discussion  with ID, ongoing leukocytosis is not unexpected with nec panc. CT 7/18 showed unchanged moderate ascites and collection size. Cystogastrostomy on 7/21 with therapeutic para, able to take out ~60 mL, sent for testing and notable for secondary bacterial peritonitis.     - Ceftriaxone + Linezolid for peritonitis   - Daily CBC  - MAP goal > 65  - GI following, appreciate recs               - Cystogastrostomy 7/21               - Therapeutic para 7/21               - pancreatic enzymes    - For back pain: Tylenol PRN and Dilaudid 2mg q3h PRN              - 1-2mg q4h PRN available for breakthrough pain following procedure  - Stop cipro ppx, restart once off treatment for acute peritonitis     #New onset productive cough with RLL haziness  7/19 evening, patient developed increasing cough with thick, clear secretions. CXR w/ increased RLL haziness; ddx includes pleural effusion v pneumonia v atelectasis. Most likely effusion as patient is likely fluid up due to declining dialysis and therapeutic para; unlikely pneumonia as sputum is non-purulent and patient is afebrile. Sputum clx grew Enterococcus faecium, thought to be contaminant.     7/22, SOB is worse and repeat CXR showed increased diffuse haziness over the entire right lung, especially concentrated over the lower lobe. Likely hepatic hydrothorax; patient recently developed secondary bacterial peritonitis and it is likely that the infected fluid is also accumulating in the lungs. However, no acute rise in WBC, patient is afebrile and not appearing any more sick than usual on exam. Patient is on ceftriaxone and linezolid.  - Repeat CXR today- increasing ?effusion vs. pulm edema   - Ultrasound to evaluate size of potential pleural effusion  - Consider thoracentesis if getting more symptomatic     # Severe malnutrition in setting of chronic illness  PEG-J placed 7/13. Procedure went well, patient and nutrition have discussed plan to run TFs during the day only from 12-6  pm at 70 mL/hr and work on PO intake. FWF 30 mL q4h. Will keep G tube open/vented. Patient continues to decline TFs.  - Continue Calorie Counts  - Dietitian consult for new tube feeds, appreciate recs              - Continue TFs at 70 mL/hr, 12-6 pm                - Cont panc enzymes              - Will discuss importance of nutrition with patient  - Regular Diet, thin liquids per SLP  - Continue multivitamins  - Pantoprazole 40 mg IV daily  - Zofran PRN     # Alcoholic hepatitis complicated by portal hypertension, ascites, and HE - improving   # Hx of Alcohol use disorder c/b withdrawal   # Anemia  # Coagulopathy   Patient with a history of marked alcohol use and alcoholic hepatitis in the past. Was given a course of steroids (1 week) during previous admission at OSH. Patient mental status has been stable, alert and oriented x4 for a couple weeks, well managed on lactulose regimen. Patient has consistently had 1-2 pRBC infusions weekly d/t Hgb drops of 0.3-0.5 every day (etilogy likely chronic liver disease, chronic kidney disease, phlebotomy, dilution, poor nutrition, ect). Today, Hgb stable at 8.6 following blood products given prior to procedure on 7/21(1x pRBC, 2x FFP and 2x IV Vitamin K).   - Lactulose 20g QID and PRN and rifaximin 550mg BID (titrate to 1.5L stool/day)  - Trend MELD labs  - Daily folic acid and thiamine  - Delirium precautions  - Melatonin 6mg at bedtime  - Daily CBC   - Transfuse if hemoglobin < 7, platelet < 10 or < 30 with bleeding  - Continue to monitor for signs of bleeding     MELD-Na score: 33 at 7/22/2021  5:52 AM  MELD score: 31 at 7/22/2021  5:52 AM  Calculated from:  Serum Creatinine: 3.31 mg/dL at 7/22/2021  5:52 AM  Serum Sodium: 129 mmol/L at 7/22/2021  5:52 AM  Total Bilirubin: 6.1 mg/dL at 7/22/2021  5:52 AM  INR(ratio): 1.79 at 7/22/2021  5:52 AM  Age: 30 years     # ESRD on HD (MWF)  # Hypervolemia causing acute respiratory failure, resolved  Nephrology note 7/21 indicates  that patient has been on renal replacement therapy for two months and pt status has most likely transitioned from ARF  to ESRD at this point.        > Hemodialysis as per renal              - Midodrine 10 mg PRN before dialysis sessions     > Daily chemistry labs, strict I/O, and daily weights  - Aspiration precautions  - Supplemental oxygen as needed to keep sats above 89%     # Goals of Care  Please see additional interval note for in depth details regarding conversation on 7/9 about goals of care. Patient's long term prognosis is still quite poor given his multi organ failure. Care conference on 7/15, pt did not wish to participate. Met with sisters Noni (on phone) and Melyssa and close friend Leroy with palliative, nephrology and general medicine teams today to discuss Dax's current improving status, poor prognosis and wishes to continue full cares. Plan to meet Fridays at 4 pm with teams and family for weekly care conferences.  - Hepatology to write a note weighing in on transplant status to help shape expectations and conversation with family this week     Diet: Snacks/Supplements Adult: Ensure Clear; With Meals  Calorie Counts  Fluid restriction 1200 ML FLUID  Adult Formula Drip Feeding: Specified Time Vital 1.5; Jejunostomy; Goal Rate: 70; mL/hr; From: 12:00 PM; 6:00 PM; Medication - Feeding Tube Flush Frequency: At least 15-30 mL water before and after medication administration and with tube clogging;...  Clear Liquid Diet    DVT Prophylaxis: Pneumatic Compression Devices  Rdz Catheter: Not present  Fluids: 5 mL NS TKO  Central Lines: PRESENT  PICC Triple Lumen 06/28/21 Left-Site Assessment: WDL  CVC Double Lumen Right-Site Assessment: WDL  Code Status: Full Code      Disposition Plan   Expected discharge: 07/26/2021 recommended to transitional care unit once nutrition improved, infected pancreatic pseudocyst is well managed and plan for dialysis is in place.     The patient's care was discussed with the  Attending Physician, Dr. South.    Jaime Amin, MS3  Medical Student  [facility]  Securely message with the [employer] Web Console (learn more here)  Text page via [employer] Paging/Directory  Please see sign in/sign out for up to date coverage information  ___________________________________________________________________    Interval History   Yesterday evening, ascites fluid analysis came back positive for SBP; patient was started on abx. Blood pressure dropped to 92/38 but came back up to normal values for patient by the morning.    This morning, patient was very tired and asked to defer questions until later in the day. Dyspnea was mild and seemed about the same compared to yesterday.    Data reviewed today: I reviewed all medications, new labs and imaging results over the last 24 hours. I personally reviewed the chest x-ray image(s) showing diffuse haziness throughout R lung, increased opacity over RLL, worsened from previous CXR.    Physical Exam   Vital Signs: Temp: 98.2  F (36.8  C) Temp src: Oral BP: 120/68 Pulse: 112   Resp: 25 SpO2: 98 % O2 Device: Nasal cannula Oxygen Delivery: 4 LPM  Weight: 171 lbs 15.34 oz  Gen: Lying down in bed, appears comfortable. Similarly interactive as the past few days.   HEENT: Conjunctival icterus  CV: Tachycardic. Regular rhythm.   Resp: Breathing comfortably on RA, no dyspnea while talking but some coughing during exam. Upper lobes CTA. Diminished breath sounds in RLL.  Abd: Refused palpation today, PEG J in place without surrounding erythema   Skin: Jaundiced.  Ext: Well perfused, no edema  Neuro: Alert and oriented, responding appropriately to questions    Data   Recent Labs   Lab 07/22/21  0552 07/21/21  1713 07/21/21  1608 07/21/21  1133 07/21/21  0550 07/20/21  0601   WBC 21.2*  --   --   --  21.7* 24.3*   HGB 8.6*  --  8.7*  --  6.6* 7.0*   MCV 94  --   --   --  96 95     --   --   --  250 271   INR  1.79*  --  1.67*  --  1.75* 1.75*   *  --   --   --  130* 126*   POTASSIUM 4.0  --   --   --  3.8 5.4*   CHLORIDE 96  --   --   --  97 94   CO2 21  --   --   --  24 19*   BUN 43*  --   --   --  33* 62*   CR 3.31*  --   --   --  2.69* 4.22*   ANIONGAP 12  --   --   --  9 13   JANENE 8.4*  --   --   --  8.3* 8.6   GLC 76 98  --  108* 110* 103*   ALBUMIN 2.1*  --   --   --  2.1* 2.1*   PROTTOTAL 6.8  --   --   --  6.7* 7.1   BILITOTAL 6.1*  --   --   --  5.5* 6.3*   ALKPHOS 156*  --   --   --  175* 180*   ALT 14  --   --   --  15 16   AST 46*  --   --   --  47* 50*     Recent Results (from the past 24 hour(s))   XR Surgery CESIA G/T 5 Min Fluoro w Stills    Narrative    This exam was marked as non-reportable because it will not be read by a   radiologist or a [place] non-radiologist provider.

## 2021-07-22 NOTE — PROGRESS NOTES
Nephrology Progress Note  07/22/2021         Dax Villareal is a 30 year old male with h/o ETOH hepatitis, end stage liver disease, RADHA on HD, transferred from [facility] in [place] for septic shock on 6/28/21 for treatment of necrotizing pancreatitis and decompensated liver disease. He was initially admitted to [facility] 5/19/21.        Interval History :   Mr Villareal had GI stenting of pancreas yesterday, tolerated well and is hemodynamically stable today.  Seen on HD this am, pulling 2L of UF.  Next planned run 7/24, no signs of renal recovery which is now unlikely after nearly 2 months of RRT (started at OSH end of May).  He was a bit SOB this am, does have R sided pleural effusion which may be of benefit to tap from both pulm and hemodynamic standpoint.         Assessment & Recommendations:   RADHA-Cr was 0.8 as recently as 5/19/2021 but now HD dependent, started RRT at [facility] prior to transfer to [facility] (exact date unclear but per family it was end of May).  Etiology likely multifactorial with contrast, sepsis/pancreatitis, likely HRS physiology contributing as well.  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  Continuing RRT as long as it is tolerated with regards to BP's, is functional enough to possibly do outpt HD.  MELD is in high 30's so has high 3 month mortality but continuing restorative cares for now.  Had GI stenting of pancreas on 7/21.                 -Line is TDC from PTA               -HD today, 2L of UF as BP's allow, next planned run on 7/24.     Volume status-Anuric, has abdominal distension and some mild peripheral edema. Pulling 2L on run today and will continue to challenge his fluid status.        Electrolytes/pH-K 4.0 and Na 129 prior to run today, ordered fluid restriction for hyponatremia.        Ca/phos/pth-Ca 8.4, corrects to normal with low albumin.       Anemia-Hgb 8.6, acute management per team, last PRBC's 7/14.       Nutrition-Taking PO, appetite ok.       Discussed with  "Dr Torres      Recommendations were communicated to primary team via verbal communication.       SOFIA Marlow CNS  Clinical Nurse Specialist  [phone]    Review of Systems:   I reviewed the following systems:  Gen: No fevers or chills  CV: No CP at rest  Resp: No SOB at rest  GI: No N/V    Physical Exam:   I/O last 3 completed shifts:  In: 2371 [P.O.:270; I.V.:600; Other:720; NG/GT:210]  Out: 265 [Drains:265]   /52   Pulse 112   Temp 98.2  F (36.8  C) (Oral)   Resp 13   Ht 1.676 m (5' 6\")   Wt 78 kg (171 lb 15.3 oz)   SpO2 98%   BMI 27.75 kg/m       GENERAL APPEARANCE: Mild distress, + abd pain.    EYES:  scleral icterus, pupils equal  Pulmonary: lungs clear to auscultation. Off supplemental oxygen   CV: tachy   - Edema - no LE edema  GI: large distended, tender. Has retroperitoneal drain  MS: no evidence of inflammation in joints, no muscle tenderness  SKIN: no rash, warm, dry, no cyanosis  NEURO: alert/interactive  ACCESS: Right TDC     Labs:   All labs reviewed by me  Electrolytes/Renal - Recent Labs   Lab Test 07/22/21  0552 07/21/21  1713 07/21/21  1133 07/21/21  0550 07/20/21  0601 07/16/21  0616 07/15/21  0443 07/14/21  0345 07/14/21  0345 07/13/21  0718   *  --   --  130* 126* 130*  130* 131*  --  130* 131*   POTASSIUM 4.0  --   --  3.8 5.4* 4.0 3.3*  --  4.2 3.6   CHLORIDE 96  --   --  97 94 99 98  --  98 98   CO2 21  --   --  24 19* 20 23  --  20 23   BUN 43*  --   --  33* 62* 39* 26  --  42* 29   CR 3.31*  --   --  2.69* 4.22* 3.23* 2.30*  --  3.35* 2.60*   GLC 76 98 108* 110* 103* 151* 131*   < > 137* 94   JANENE 8.4*  --   --  8.3* 8.6 7.8* 8.1*  --  8.6 8.5   MAG  --   --   --   --   --   --  1.6  --  1.6 1.5*   PHOS 6.8*  --   --   --   --  2.0* 3.0  --  4.6* 4.3    < > = values in this interval not displayed.       CBC -   Recent Labs   Lab Test 07/22/21  0552 07/21/21  1608 07/21/21  0550 07/20/21  0601   WBC 21.2*  --  21.7* 24.3*   HGB 8.6* 8.7* 6.6* 7.0*     -- "  250 271       LFTs -   Recent Labs   Lab Test 07/22/21  0552 07/21/21  0550 07/20/21  0601   ALKPHOS 156* 175* 180*   BILITOTAL 6.1* 5.5* 6.3*   ALT 14 15 16   AST 46* 47* 50*   PROTTOTAL 6.8 6.7* 7.1   ALBUMIN 2.1* 2.1* 2.1*       Iron Panel -   Recent Labs   Lab Test 07/19/21  0632   IRON 31*   IRONSAT 43           Current Medications:    amylase-lipase-protease  2 capsule Oral TID w/meals     B and C vitamin Complex with folic acid  5 mL Per Feeding Tube Daily     cefTRIAXone  1 g Intravenous Q24H     folic acid  1 mg Oral Daily    Or     folic acid  1 mg Intravenous Daily     HYDROmorphone (STANDARD CONC)  2 mg Oral Once     lactulose  20 g Oral or NG Tube 4x Daily     linezolid  600 mg Oral Q12H LENKA     melatonin  6 mg Oral At Bedtime     pantoprazole  40 mg Oral QAM AC     phytonadione  10 mg Intravenous Once     [Held by provider] potassium chloride  20 mEq Oral BID     rifaximin  550 mg Oral BID     sodium chloride (PF)  10 mL Irrigation Q8H     sodium chloride (PF)  9 mL Intracatheter During Dialysis/CRRT (from stock)     sodium chloride (PF)  9 mL Intracatheter During Dialysis/CRRT (from stock)     vitamin B1  100 mg Oral Daily    Or     thiamine  100 mg Intravenous Daily       - MEDICATION INSTRUCTIONS -       dextrose

## 2021-07-22 NOTE — PROVIDER NOTIFICATION
[facility]. SBP . MAP 60. No other changes in pt status. No interventions at this time, continue to monitor.

## 2021-07-22 NOTE — PROGRESS NOTES
GASTROENTEROLOGY PROGRESS NOTE    Date of Admission: 6/28/2021  Reason for Admission: pancreatitis/hepatitis      ASSESSMENT/RECOMMENDATIONS:  30 year old male with a history of heavy alcohol use who presented to OSH 5/19 for abdominal pain, nausea and vomiting, admitted for alcohol hepatitis, ESLD, RADHA on HD, septic shock requiring pressors as well as necrotizing pancreatitis with large evolving necrotic collection. Transferred to [facility] for consideration of drainage of presumed infected necrotic collection. S/p EUS-guided necrosectomy (7/21/21).     #. Acute necrotizing pancreatitis with presumed infected walled off necrotic collection s/p percutaneous drainage  #. Septic shock, resolved  #. Leukocytosis  #. Ascites/Peritonitis  Unclear evolution of pancreatitis history but had a recent CT scan with large evolving necrotic collection, presumed infected. No obvious gas in collection. CT on admission to [facility] showing mature collection with enhancing wall measuring ~[id number]. With profound coagulopathy (liver disease + malnutrition +sepsis), INR 3.8 on transfer he was given FFP and Vit K, IR placed perc drain 6/29. Repeat imaging 7/18 with still large walled off necrotic collection that does appear amenable to endoscopic transluminal drainage, underwent cystgastrostomy stenting with necrosectomy on 7/21/21 (see procedure notes for details), large amount of solid debris identified. Would recommend ongoing discussions with the patient regarding goals of care.  Etiology: alcohol  Date of onset: 5/19  BISAP score on admission: unclear  Concurrent organ failure: respiratory (now extubated), renal (on HD), liver, cardiogenic (weaned off pressors 6/29)  Thromboses: none  Diabetes: no  Current nutrition access: PEG-J  Last imaging: CT scan with IV contrast 7/18  Infection/antiinfectives: (Enterococcus faecium VRE from L RP flank drain 7/14)                - Meropenem (6/29 - 7/18)     - Ciprofloxacin (7/19 - present)       "          - Vancomycin (6/29 - 6/30)     - Micafungin (6/29 - 7/1)   Intervention:    6/30 - Left IR RP perc drain (24F)    7/2 - Diagnostic paracentesis (WBC 2663 - 74% PMN, SAAG <1.1, protein 4.1)     7/5 - Repeat Paracentesis (WBC 1801 - 61% PMN, lipase 48)    7/13 - Diag/therapeutic paracentesis ()    7/13 - PEG-J placement with T-tag gastropexy    7/21 - Repeat paracentesis    7/21 - EUS-cystgastrostomy and necrosectomy, Axios with Viabil    - continue 10mg IV Vit K for three days since last procedure  -Check INR for three days leading up to next intervention  -Drain management per IR   -PRN paracentesis  -Antibiotics per ID  -Pain meds per primary team    #. Severe malnutrition in the context of chronic illness  #. S/p PEG-J placement  Patient with very poor oral intake over the course of his illness and was not meeting calorie requirements orally. S/p PEG-J with T-tag gastropexy (x3) 7/13.  -Advance tube feeds as tolerated  -Please start PERT (defer to dietitian for dosing)  -Oral diet as tolerated (can ADAT after his necrosectomy)  -T-tags (white buttons) should be removed 2 weeks following procedure (~7/27) by lifting the button and cutting the underlying blue suture. Often times the buttons fall off prior to the 2 week louis.     #. Alcohol hepatitis  #. Hyperbilirubinemia  #. Coagulopathy  #. Encephalopathy  Known heavy alcohol use. Reportedly received course of steroids at OSH for 1 week with \"some improvement\". Currently has primarily hyperbilirubinemia and mildly elevated AST. Unclear if he has underlying cirrhosis but certainly would fit with alcohol hepatitis vs elevated liver tests of critical illness/sepsis. Not candidate for steroids right now. He is receiving lactulose and Xifaxin for encephalopathy but having a lot of stool output. Hepatology notified of admission, ongoing discussions with them. Not currently liver transplant candidate.  -Trend MELD labs  -Continue lactulose/Xifaxin and " "titrate to 3 soft stools per day -- please record stool output  -Alcohol abstinence  -Hepatology team following, appreciate support and expertise    This patient has a very poor overall prognosis and agree with palliative care consultation and GOC discussions with the family    The patient was discussed and plan agreed upon with GI staff, Dr. Ramos Cifuentes MD  Gastroenterology Fellow  Division of Gastroenterology, Hepatology and Nutrition  [facility]  [phone]  See AMION/AMCOM for GI on-call information    ____________________________________________      Subjective: Nursing notes and 24hr events reviewed. NAEO. Tolerated HD this AM, returned fatigued. Denies any worsening/change in his abdominal pain since his necrosectomy. Is wondering when he can advance his diet.     ROS:   4 pt ROS negative unless noted in subjective.     Objective:  Blood pressure 102/45, pulse 112, temperature 97.9  F (36.6  C), temperature source Oral, resp. rate 20, height 1.676 m (5' 6\"), weight 78 kg (171 lb 15.3 oz), SpO2 93 %.  Gen: Laying in bed. Appears comfortable, fatigued. Reaching to drink from a cup of juice.  HEENT: NCAT. Conjunctiva clear. Sclera ++icteric, poor dentition  CV: Peripheral perfusion intact  Resp: non labored breathing  Abd: moderate distention, minimal dark brown output in RP perc drain, GJ tube with dried blood but as previous, patient would not let me touch his abdomen  Msk: no gross deformity  Skin:  ++ jaundice  Ext: warm, well perfused  Mental status/Psych: alert and oriented, flat affect, somnolent    LABS:  BMP  Recent Labs   Lab 07/22/21  0552 07/21/21  1713 07/21/21  1133 07/21/21  0550 07/20/21  0601 07/19/21  0632   *  --   --  130* 126* 127*   POTASSIUM 4.0  --   --  3.8 5.4* 5.1   CHLORIDE 96  --   --  97 94 96   JANENE 8.4*  --   --  8.3* 8.6 8.6   CO2 21  --   --  24 19* 21   BUN 43*  --   --  33* 62* 50*   CR 3.31*  --   --  2.69* 4.22* 3.47*   GLC 76 98 108* 110* 103* 83 "     CBC  Recent Labs   Lab 07/22/21  0552 07/21/21  0550 07/20/21  0601 07/19/21  0632   WBC 21.2* 21.7* 24.3* 21.0*   RBC 2.77* 2.13* 2.24* 2.26*   HGB 8.6* 6.6* 7.0* 7.0*   HCT 26.1* 20.5* 21.2* 21.7*   MCV 94 96 95 96   MCH 31.0 31.0 31.3 31.0   MCHC 33.0 32.2 33.0 32.3   RDW 17.7* 18.7* 18.6* 19.7*    250 271 251     INR  Recent Labs   Lab 07/22/21  1426 07/22/21  0552 07/21/21  1608 07/21/21  0550   INR 1.77* 1.79* 1.67* 1.75*     LFTs  Recent Labs   Lab 07/22/21  0552 07/21/21  0550 07/20/21  0601 07/19/21  0632   ALKPHOS 156* 175* 180* 190*   AST 46* 47* 50* 63*   ALT 14 15 16 19   BILITOTAL 6.1* 5.5* 6.3* 6.3*   PROTTOTAL 6.8 6.7* 7.1 6.7*   ALBUMIN 2.1* 2.1* 2.1* 2.1*      PANC  No lab results found in last 7 days.      IMAGING:  EXAMINATION: CT ABDOMEN PELVIS W CONTRAST, 7/18/2021 11:29 AM     TECHNIQUE:  Helical CT images from the lung bases through the  symphysis pubis were obtained with IV contrast. Contrast: 119 cc  [id number]      COMPARISON: 7/11/2021, 6/30/2021     HISTORY: Abdominal abscess/infection suspected     FINDINGS:     LUNG BASES: Normal heart size, trace pericardial effusion. Moderate  right pleural effusion with associated compressive atelectasis. Tiny  left basilar pleural effusion with associated compressive atelectasis.     ABDOMEN/PELVIS: Percutaneous gastrojejunostomy with tip in the  proximal jejunum. Unremarkable hepatic parenchyma. Ascites along the  inferior right hepatic lobe, extending into the pelvis and the left  paracolic gutter. Mild mural hyperemia of the gallbladder with mild  associated gallbladder thickening. Sequela of necrotizing pancreatitis  with associated 15 x 6 cm peripancreatic walled off necrosis with  drain in place. Overall, there is improved mesenteric stranding  surrounding the necrotic collection. There is viable enhancement of  the uncinate process and pancreatic head as well as distal body and  tail. Spleen measures 16 cm. There is extensive  reactive appearing  lymphadenopathy throughout the mesentery and retroperitoneum. There is  poor contrast uptake throughout the renal parenchyma. Visualized  ureters are normal. Normal caliber, edematous loops of small bowel.  Predominantly fluid-filled colon. Appendix is well visualized. Major  abdominal vasculature is patent. No concerning pelvic mass.     BONES: No acute or suspicious osseous abnormality.                                                                      IMPRESSION:  1. Continued sequelae of necrotizing pancreatitis with the 15 x 6 cm  peripancreatic walled off necrosis predominantly involving the lesser  sac with drain in place. Enhancement of the residual viable pancreatic  tissue in the uncinate, body, head, and the majority of the tail.  2. Poor parenchymal enhancement of the kidneys suggesting ATN.  3. Moderate amount of ascites with diffuse peritoneal enhancement and  dependant debris in the pelvis. Peritonitis cannot be ruled out.  4. Right pleural effusion with associated compressive atelectasis.  Trace left pleural effusion.

## 2021-07-22 NOTE — PLAN OF CARE
Neuro: Patient alert and oriented x4. Continues to refuse some cares.   Cardiac: 's-110's. BP's stable. Afebrile.             Respiratory: Sating high 90s on RA. Requested oxygen for comfort.   GI/: Aneuric. Loose BMs x1. Stool sample sent.   Diet/appetite: Cycled TF @ 70 mls/hour with 30cc flushes Q4 hours. 12p-6p cycled. Eating 2g Na diet.   Activity: Total lift assist to be out of bed. Turns when patient allows.   Pain: Reports pain in abdomen and back. Dilaudid given PO q3 hours.  Skin: Coccyx red but blanchable. Barrier cream applied.   LDA's: G and J tube, triple lumen PICC, abdominal drain dressing change refused.       Plan: Dialysis completed for 2.5 L. Will continue with plan of care.

## 2021-07-22 NOTE — PROGRESS NOTES
HEMODIALYSIS TREATMENT NOTE    Date: 7/22/2021  Time: 1:24 PM    Data:  Pre Wt: 78 kg (171 lb 15.3 oz)   Desired Wt: 75.5 kg   Post Wt: 75.5 kg (166 lb 7.2 oz)  Weight change: 2.5 kg  Ultrafiltration - Post Run Net Total Removed (mL): 2500 mL  Vascular Access Status: CVC  patent  Dialyzer Rinse: Streaked, Light  Total Blood Volume Processed: 78.19 L   Total Dialysis (Treatment) Time: 3.5   Dialysate Bath: K 3, Ca 3  Heparin: None    Lab:   No    Interventions:Assessment:  Pt dialyzed for 3.5hrs on K 3, Ca 3 bath. Pt was vitally stable. A&Ox4. Tolerated 2.5L fluid removal. No medication was given during tx. CVC site clean, dry, and intact. Max 400BFR achieved with desired AP and . Blood rinsed back without complication. CVC lumens locked with saline and capped with clear guard. Hand off report given to ROB Domingo. Pt transferred to the floor in stable condition.     Plan:    Next HD per renal team

## 2021-07-22 NOTE — PROGRESS NOTES
Calorie Count  Intake recorded for: 7/21  Total Kcals: 0 Total Protein: 0g  Kcals from Hospital Food: 0   Protein: 0g  Kcals from Outside Food (average):0 Protein: 0g  # Meals Ordered from Kitchen: 1 meal  # Meals Recorded: No food intake recorded  # Supplements Recorded: No intake recorded

## 2021-07-23 LAB
ALBUMIN SERPL-MCNC: 2.7 G/DL (ref 3.4–5)
ALP SERPL-CCNC: 147 U/L (ref 40–150)
ALT SERPL W P-5'-P-CCNC: 12 U/L (ref 0–70)
ANION GAP SERPL CALCULATED.3IONS-SCNC: 9 MMOL/L (ref 3–14)
AST SERPL W P-5'-P-CCNC: 40 U/L (ref 0–45)
BACTERIA SPT CULT: ABNORMAL
BACTERIA SPT CULT: ABNORMAL
BILIRUB SERPL-MCNC: 5.4 MG/DL (ref 0.2–1.3)
BUN SERPL-MCNC: 22 MG/DL (ref 7–30)
CALCIUM SERPL-MCNC: 8.6 MG/DL (ref 8.5–10.1)
CHLORIDE BLD-SCNC: 98 MMOL/L (ref 94–109)
CO2 SERPL-SCNC: 24 MMOL/L (ref 20–32)
CREAT SERPL-MCNC: 2.31 MG/DL (ref 0.66–1.25)
ERYTHROCYTE [DISTWIDTH] IN BLOOD BY AUTOMATED COUNT: 17.2 % (ref 10–15)
GFR SERPL CREATININE-BSD FRML MDRD: 36 ML/MIN/1.73M2
GLUCOSE BLD-MCNC: 92 MG/DL (ref 70–99)
GRAM STAIN RESULT: ABNORMAL
HCT VFR BLD AUTO: 25.4 % (ref 40–53)
HGB BLD-MCNC: 8.3 G/DL (ref 13.3–17.7)
INR PPP: 1.8 (ref 0.85–1.15)
LACTATE SERPL-SCNC: 0.9 MMOL/L (ref 0.7–2)
MCH RBC QN AUTO: 31 PG (ref 26.5–33)
MCHC RBC AUTO-ENTMCNC: 32.7 G/DL (ref 31.5–36.5)
MCV RBC AUTO: 95 FL (ref 78–100)
PHOSPHATE SERPL-MCNC: 3.9 MG/DL (ref 2.5–4.5)
PLATELET # BLD AUTO: 202 10E3/UL (ref 150–450)
POTASSIUM BLD-SCNC: 3.6 MMOL/L (ref 3.4–5.3)
PROT SERPL-MCNC: 6.9 G/DL (ref 6.8–8.8)
RBC # BLD AUTO: 2.68 10E6/UL (ref 4.4–5.9)
SODIUM SERPL-SCNC: 131 MMOL/L (ref 133–144)
WBC # BLD AUTO: 21.1 10E3/UL (ref 4–11)

## 2021-07-23 PROCEDURE — 120N000005 HC R&B MS OVERFLOW UMMC

## 2021-07-23 PROCEDURE — 250N000011 HC RX IP 250 OP 636: Performed by: INTERNAL MEDICINE

## 2021-07-23 PROCEDURE — 36592 COLLECT BLOOD FROM PICC: CPT | Performed by: NURSE PRACTITIONER

## 2021-07-23 PROCEDURE — 99232 SBSQ HOSP IP/OBS MODERATE 35: CPT | Mod: GC | Performed by: STUDENT IN AN ORGANIZED HEALTH CARE EDUCATION/TRAINING PROGRAM

## 2021-07-23 PROCEDURE — 250N000011 HC RX IP 250 OP 636: Performed by: HOSPITALIST

## 2021-07-23 PROCEDURE — 250N000009 HC RX 250: Performed by: INTERNAL MEDICINE

## 2021-07-23 PROCEDURE — 80053 COMPREHEN METABOLIC PANEL: CPT | Performed by: INTERNAL MEDICINE

## 2021-07-23 PROCEDURE — 250N000013 HC RX MED GY IP 250 OP 250 PS 637: Performed by: INTERNAL MEDICINE

## 2021-07-23 PROCEDURE — 84100 ASSAY OF PHOSPHORUS: CPT | Performed by: HOSPITALIST

## 2021-07-23 PROCEDURE — 85014 HEMATOCRIT: CPT | Performed by: INTERNAL MEDICINE

## 2021-07-23 PROCEDURE — 99232 SBSQ HOSP IP/OBS MODERATE 35: CPT | Performed by: PHYSICIAN ASSISTANT

## 2021-07-23 PROCEDURE — 250N000013 HC RX MED GY IP 250 OP 250 PS 637: Performed by: HOSPITALIST

## 2021-07-23 PROCEDURE — 250N000011 HC RX IP 250 OP 636: Performed by: STUDENT IN AN ORGANIZED HEALTH CARE EDUCATION/TRAINING PROGRAM

## 2021-07-23 PROCEDURE — P9047 ALBUMIN (HUMAN), 25%, 50ML: HCPCS | Performed by: STUDENT IN AN ORGANIZED HEALTH CARE EDUCATION/TRAINING PROGRAM

## 2021-07-23 PROCEDURE — 99233 SBSQ HOSP IP/OBS HIGH 50: CPT | Mod: GC | Performed by: HOSPITALIST

## 2021-07-23 PROCEDURE — 83605 ASSAY OF LACTIC ACID: CPT | Performed by: NURSE PRACTITIONER

## 2021-07-23 PROCEDURE — 85610 PROTHROMBIN TIME: CPT

## 2021-07-23 PROCEDURE — 82040 ASSAY OF SERUM ALBUMIN: CPT | Performed by: INTERNAL MEDICINE

## 2021-07-23 PROCEDURE — 36592 COLLECT BLOOD FROM PICC: CPT | Performed by: INTERNAL MEDICINE

## 2021-07-23 PROCEDURE — 99233 SBSQ HOSP IP/OBS HIGH 50: CPT | Performed by: INTERNAL MEDICINE

## 2021-07-23 PROCEDURE — 258N000003 HC RX IP 258 OP 636: Performed by: HOSPITALIST

## 2021-07-23 PROCEDURE — 250N000013 HC RX MED GY IP 250 OP 250 PS 637

## 2021-07-23 RX ADMIN — LINEZOLID 600 MG: 100 SUSPENSION ORAL at 21:07

## 2021-07-23 RX ADMIN — HYDROMORPHONE HYDROCHLORIDE 1 MG: 1 SOLUTION ORAL at 03:36

## 2021-07-23 RX ADMIN — ALBUMIN HUMAN 50 G: 0.25 SOLUTION INTRAVENOUS at 00:04

## 2021-07-23 RX ADMIN — FOLIC ACID 1 MG: 1 TABLET ORAL at 12:24

## 2021-07-23 RX ADMIN — HYDROMORPHONE HYDROCHLORIDE 2 MG: 1 SOLUTION ORAL at 18:08

## 2021-07-23 RX ADMIN — HYDROMORPHONE HYDROCHLORIDE 2 MG: 1 SOLUTION ORAL at 21:06

## 2021-07-23 RX ADMIN — HYDROXYZINE HYDROCHLORIDE 50 MG: 25 TABLET, FILM COATED ORAL at 12:24

## 2021-07-23 RX ADMIN — HYDROMORPHONE HYDROCHLORIDE 1 MG: 1 SOLUTION ORAL at 11:39

## 2021-07-23 RX ADMIN — Medication 550 MG: at 21:07

## 2021-07-23 RX ADMIN — Medication 550 MG: at 12:27

## 2021-07-23 RX ADMIN — THIAMINE HYDROCHLORIDE 100 MG: 100 INJECTION, SOLUTION INTRAMUSCULAR; INTRAVENOUS at 11:19

## 2021-07-23 RX ADMIN — HYDROMORPHONE HYDROCHLORIDE 2 MG: 1 SOLUTION ORAL at 14:09

## 2021-07-23 RX ADMIN — BENZONATATE 100 MG: 100 CAPSULE ORAL at 16:43

## 2021-07-23 RX ADMIN — HYDROMORPHONE HYDROCHLORIDE 1 MG: 1 SOLUTION ORAL at 09:23

## 2021-07-23 RX ADMIN — CEFTRIAXONE SODIUM 2 G: 2 INJECTION, POWDER, FOR SOLUTION INTRAMUSCULAR; INTRAVENOUS at 16:43

## 2021-07-23 RX ADMIN — PHYTONADIONE 10 MG: 10 INJECTION, EMULSION INTRAMUSCULAR; INTRAVENOUS; SUBCUTANEOUS at 11:20

## 2021-07-23 RX ADMIN — LINEZOLID 600 MG: 100 SUSPENSION ORAL at 12:27

## 2021-07-23 ASSESSMENT — ACTIVITIES OF DAILY LIVING (ADL)
ADLS_ACUITY_SCORE: 19

## 2021-07-23 NOTE — PROGRESS NOTES
[facility] PROGRESS NOTE     Patient:  Dax Villareal   Date of birth 1990, Medical record number [id number]  Date of Visit:  07/23/2021  Date of Admission: 6/28/2021  Consult Requester:Patricia Baker, *          Assessment and Plan:   ID PROBLEM LIST:  1. Peritonitis  2. Acute necrotizing pancreatitis s/p cystgastrostomy stenting with necrosectomy 7/21  3. RADHA requiring iHD  4. Decompensated ESLD c/b HE  5. Alcoholic hepatitis    RECOMMENDATIONS:   1. Continue ceftriaxone 2g daily and linezolid 600mg q12h for SBP treatment. Plan to complete 5 days (last day on 7/25).  - Call us back if there if growth on ascites cultures to adjust antibiotics.  2. Resume Cipro on 7/26 for SBP prophylaxis  3. Agree with additional evaluation of right lung opacity.    - Can call us back if fluid studies obtained and concerning for infection.     [facility] ID will sign off. Please do not hesitate to contact with additional questions or concerns as they arise.      ASSESSMENT:  aDx Villareal is a 30 year old male with PMHx significant for alcohol use disorder who was admitted to [facility] in [place], [place] for abdominal pain, nausea and vomiting, found to have end stage liver disease c/b hepatic encephalopathy, acute renal failure requiring iHD, SBP and acute necrotizing pancreatitis.      Afebrile on admission, however has spiked some low grade intermittent fevers up to 100.9 on 7/1-2 then defervesced. WBC significantly elevated on admission to 50K with slow downtrend over the past few weeks, currently 17K. Lactate intermittently elevated into the 2-3 range, otherwise wnl. Infectious work up since admission to [facility] has remained negative. BCx negative. Drain placement pancreatic cultures collected 6/30 NGTD/negative. 7/2 Ascites fluid cultures negative, labs included WBC 2663/74% neutrophils. Repeat ascites fluid cultures 7/5 with 1801 WBC/61% neutrophils, cultures negative. 7/13 ascites  fluid cultures NGTD. See imaging section below for review.      Started on meropenem (6/29-7/18), micafungin (6/29-7/1), and given 1x dose of IV Vancomycin (6/29). On Rifaximin. He has now completed 4-5 days following replacement of perc drain for a total of 21 days of broad spectrum antibiotics s/p initial perc drain placement for necrotizing pancreatitis with pseudocyst. Cultures remain largely negative with exception of VRE (see below). As such transitioned to Cipro for SBP prophylaxis and monitoring off of broad abx.     However, paracentesis on 7/21 revealed 453 WBC, 71% neutrophils. WBC count overall is not much increased from prior studies, but with higher % of neutrophils elected to start treatment for peritonitis. As he has just completed a course of meropenem and does not have known history of ESBL organisms, would use ceftriaxone for empiric SBP coverage. Would also treat with linezolid for VRE coverage given prior VRE isolated at time of abdominal drain exchange as noted below. Antibiotics can be adjusted based on ascites fluid culture if needed. So far NGTD. As such will plan to complete 5 days of empiric treatment with ceftriaxone and linezolid. When peritonitis antibiotics are completed plan to resume Cipro for SBP prophylaxis.     Of note, Abscess culture collected 7/14 from his re-positioned drain with growth of VRE. When the culture was collected he was clinically stable and there was no evidence of a new infection with improvement in WBC count noted on serial ascites studies. As such, elected not to treat VRE as it was felt to most likely be colonization having been selected with prolonged meropenem coverage.      Rosa Elena Horan PA-C  Pronouns: she/her/hers  Infectious Diseases  Pager: [phone]  07/23/2021           Interim History and Events:   Afebrile. Up to 4L supplemental O2 use, now down to 2L. No hypoxia recorded.  Reports breathing feels slightly better than yesterday. Endorses  "abdominal pain and distension. No peripheral edema.           HPI:   Dax Villareal is a 30 year old male with PMHx significant for alcohol use disorder who was admitted to [facility] in [place], [place] for abdominal pain, nausea and vomiting, found to have end stage liver disease c/b hepatic encephalopathy, acute renal failure requiring iHD, SBP and acute necrotizing pancreatitis.      He required respiratory support (BIPAP, ? Intubation) for respiratory failure and appeared to be in sepsis (lactate reported at 7.7 with abnormal LFTs). He was transferred to [facility] for prolonged admission and reportedly treated with steroids; kidney function and pancreatic enzymes normal on admission). Length of stay stated >40 days. CT imaging 6/19 with concern for necrotizing pancreatitis with follow up CT scan 6/25 with peripancreatic fluid collection and 9 cm soft tissue/hemorrhagic structure c/f internal hemorrhage of a pseudocyst. Given multiple antibiotics (Cefepime, Flagyl, Ciprofloxacin, micafungin). Hospital course reportedly complicated by hypoxemic respiratory failure requiring BIPAP, ARF requiring iHD, hepatic encephalopathy, and septic shock 2/2 necrotizing pancreatitis/pancreatic pseudocyst requiring pressor support. Ultimately transferred to [facility] for interventions 6/28. Admitted to the ICU.      Now status post perc IR drain placement 6/29 with cultures that remain NGTD/negative. No endoscopic drainage by GI was pursued given improvement in imaging 7/11. Additionally has had 3 paracenteses (7/2, 7/5, and 7/13 with negative culture work up. Had PEGJ placed 7/13.      He is an outdoorsman. Picks mushrooms, goes fishing/hunting, and hiking. Has not been since \"last spring\". Denies feeling ill prior to admission to OSH. Feeling fairly well currently although notes diffuse abdominal pain. Has had multiple procedures within the past few days and states he feels like he needs a \"rest day\". Denies fever/chills. " No URI sx. Is having diarrhea, but on lactulose. Anuric.          ROS:   -Focused 4 point ROS completed, pertinent positives and negatives listed above.    Physical Examination:  Temp: 97.2  F (36.2  C) Temp src: Oral BP: (!) 88/42 Pulse: 104   Resp: 20 SpO2: 94 % O2 Device: Nasal cannula Oxygen Delivery: 2 LPM    Vitals:    07/18/21 0500 07/18/21 1430 07/20/21 0800 07/22/21 0350   Weight: 78.5 kg (173 lb 1 oz) 77.5 kg (170 lb 13.7 oz) 78.5 kg (173 lb 1 oz) 78 kg (171 lb 15.3 oz)    07/22/21 0900   Weight: 78 kg (171 lb 15.3 oz)         Constitutional: Adult male patient seen lying in bed, in NAD.  HEENT: NCAT, icterus, conjunctiva clear. NC in place  Respiratory: No increased work of breathing  GI: abdomen protuberant, distended  Skin: Warm and dry. Jaundiced. No rashes, scattered petechiae on torso.  Musculoskeletal: Extremities grossly normal. No edema present.   Neurologic: speech normal, answering questions appropriately. Moves all extremities spontaneously. Grossly non-focal.  Neuropsychiatric: Mentation and affect normal/appropriate, somewhat flat.  VAD: CVC is c/d/i with no erythema, drainage, or tenderness.      Medications:    amylase-lipase-protease  2 capsule Oral TID w/meals     B and C vitamin Complex with folic acid  5 mL Per Feeding Tube Daily     cefTRIAXone  2 g Intravenous Q24H     folic acid  1 mg Oral Daily    Or     folic acid  1 mg Intravenous Daily     HYDROmorphone (STANDARD CONC)  2 mg Oral Once     lactulose  20 g Oral or NG Tube 4x Daily     linezolid  600 mg Oral Q12H LENKA     melatonin  6 mg Oral At Bedtime     pantoprazole  40 mg Oral QAM AC     phytonadione  10 mg Intravenous Once     phytonadione  10 mg Intravenous Once     [Held by provider] potassium chloride  20 mEq Oral BID     rifaximin  550 mg Oral BID     sodium chloride (PF)  10 mL Irrigation Q8H     vitamin B1  100 mg Oral Daily    Or     thiamine  100 mg Intravenous Daily       Infusions/Drips:    - MEDICATION INSTRUCTIONS -        dextrose         Laboratory Data:   Inflammatory Markers    Recent Labs   Lab Test 10/06/20  0927   CRP 16.9*       Metabolic Studies       Recent Labs   Lab Test 07/23/21  0609 07/22/21  1555 07/22/21  0552 07/21/21  1713 07/21/21  1133 07/21/21  0550 07/20/21  1423 07/20/21  0601 07/19/21  0632 07/18/21  0637 07/15/21  0811 07/15/21  0443 07/14/21  1945 07/14/21  0345   *  --  129*  --   --  130*  --  126* 127* 128*   < > 131*  --  130*   POTASSIUM 3.6  --  4.0  --   --  3.8  --  5.4* 5.1 5.6*   < > 3.3*  --  4.2   CHLORIDE 98  --  96  --   --  97  --  94 96 97   < > 98  --  98   CO2 24  --  21  --   --  24  --  19* 21 20   < > 23  --  20   ANIONGAP 9  --  12  --   --  9  --  13 10 11   < > 10  --  12   BUN 22  --  43*  --   --  33*  --  62* 50* 68*   < > 26  --  42*   CR 2.31*  --  3.31*  --   --  2.69*  --  4.22* 3.47* 4.46*   < > 2.30*  --  3.35*   GFRESTIMATED 36*  --  23*  --   --  30*  --  18* 22* 16*   < > 36*  --  23*   GLC 92  --  76 98 108* 110*  --  103* 83 164*   < > 131*   < > 137*   JANENE 8.6  --  8.4*  --   --  8.3*  --  8.6 8.6 8.4*   < > 8.1*  --  8.6   PHOS 3.9  --  6.8*  --   --   --   --   --   --   --    < > 3.0  --  4.6*   MAG  --   --   --   --   --   --   --   --   --   --   --  1.6  --  1.6   LACT  --  1.9  --   --   --   --  1.4  --   --   --    < >  --   --   --     < > = values in this interval not displayed.       Hepatic Studies    Recent Labs   Lab Test 07/23/21  0609 07/22/21  0552 07/21/21  0550 07/20/21  0601 07/19/21  0632 07/18/21  0637   BILITOTAL 5.4* 6.1* 5.5* 6.3* 6.3* 7.4*   ALKPHOS 147 156* 175* 180* 190* 242*   ALBUMIN 2.7* 2.1* 2.1* 2.1* 2.1* 2.2*   AST 40 46* 47* 50* 63* 128*   ALT 12 14 15 16 19 29       Pancreatitis testing    Recent Labs   Lab Test 06/29/21  0410 06/28/21  1839 05/18/21  1220 12/09/20  1504 10/06/20  0927   LIPASE  --  199 381 140 139   TRIG Unsatisfactory specimen - icteric Canceled, Test credited  --   --   --        Hematology Studies       Recent Labs   Lab Test 07/23/21  0609 07/22/21  0552 07/21/21  1608 07/21/21  0550 07/20/21  0601 07/19/21  0632 07/18/21  0637 06/29/21  0410 06/28/21  1839 05/18/21  1220 12/09/20  1504 10/06/20  0927   WBC 21.1* 21.2*  --  21.7* 24.3* 21.0* 25.2*   < > 50.6* 13.2* 4.2 6.9   ANEU  --   --   --   --   --   --   --   --  44.0* 12.0* 2.1 5.2   ALYM  --   --   --   --   --   --   --   --  0.0* 0.6* 1.3 0.8   MEDHAT  --   --   --   --   --   --   --   --  2.0* 0.4 0.6 0.8   AEOS  --   --   --   --   --   --   --   --  0.5 0.0 0.2 0.1   HGB 8.3* 8.6* 8.7* 6.6* 7.0* 7.0* 7.5*   < > 7.6* 16.6 17.2 18.8*   HCT 25.4* 26.1*  --  20.5* 21.2* 21.7* 22.6*   < > 23.4* 44.8 47.7 51.2    241  --  250 271 251 251   < > 127* 222 299 311    < > = values in this interval not displayed.       Arterial Blood Gas Testing    Recent Labs   Lab Test 07/01/21  0014 06/30/21  1716 06/28/21  1839   O2PER 60.0 5L 2L        Urine Studies     Recent Labs   Lab Test 12/09/20  1817 10/06/20  1214   URINEPH 7.0 7.0   NITRITE Negative Negative   LEUKEST Negative Negative   WBCU 0 1       Vancomycin Levels     Recent Labs   Lab Test 06/30/21  0700   VANCOMYCIN 31.2*       Microbiology:  Culture Micro   Date Value Ref Range Status   07/05/2021 No growth  Final   07/05/2021 Culture negative after 2 weeks  Preliminary   07/02/2021 No growth  Final   07/02/2021 No growth  Final   07/02/2021 No growth  Final   06/30/2021 No growth  Final   06/30/2021 No anaerobes isolated  Final   06/29/2021 No growth  Final   06/29/2021 No growth  Final   05/18/2021 No growth after 6 days  Final   05/18/2021 No growth after 6 days  Final       Last check of C difficile  C Difficile Toxin B by PCR   Date Value Ref Range Status   07/17/2021 Negative Negative Final     Comment:     A negative result does not exclude actual disease due to C. difficile and may be due to improper collection, handling and storage of the specimen or the number of organisms in the specimen  is below the detection limit of the assay.       Imagin/22 CXR  IMPRESSION:   Increase in right-sided opacity. This could represent pleural  effusion, infection, or edema.     CXR  IMPRESSION: Increased haziness of right lower lung field. Possible  effusion and/or pneumonia versus atelectasis. Recommend follow-up to  Clearing.     CXR  IMPRESSION: Unchanged mixed interstitial and airspace opacities most  prominent in the perihilar and bibasilar regions.  1.  Stable support equipment.     CT AP w/o contrast   IMPRESSION:   1. Sequela of necrotizing pancreatitis with highly decreased size of  walled off necrosis centered in the lesser sac. Percutaneous drain  remains in place.  2. Loculated moderate ascites is unchanged.  3. Hepatomegaly, hepatic steatosis, and evidence of portal  hypertension again seen.  4. Small bilateral pleural effusions are slightly improved.      CT AP w/o contrast   IMPRESSION:   1. Status post placement of a left lateral approach drainage catheter,  substantially decreased but persistent hemorrhagic walled off necrosis  centered in the lesser sac. A smaller collection along the inferior  aspect of the third portion of the duodenum is minimally changed.  2. Minimally changed moderate to large volume loculated ascites.  3. Hepatomegaly and hepatic steatosis with evidence of cirrhosis and  portal hypertension.  4. Diffuse edematous wall thickening of the small and large bowel,  likely reactive. No bowel obstruction.  5. Small right and trace left pleural effusions with adjacent  compressive atelectasis, similar to prior. Small pericardial effusion.  6. Faint patchy ground glass opacities in the right middle lobe and  and right upper lobe, likely infectious/inflammatory.      CT AP w/ contrast   IMPRESSION:   1.  Sequela of necrotizing pancreatitis with peripancreatic necrotic  fluid collection, large volume abdominal ascites and mesenteric edema.  2.  Thickened, edematous  small bowel wall is likely due to reactive  enteritis secondary to pancreatitis.  3.  Hepatomegaly with steatosis and mild surface nodularity,  associated with splenomegaly and recanalized paraumbilical vein,  suggestive of cirrhosis with portal hypertension.  4.  Enlarged hypoenhancing kidneys with loss of corticomedullary  differentiation, suggestive of acute renal parenchymal disease. No  hydronephrosis or perinephric fat stranding.  5.  Small right and trace left pleural effusions, interlobular septal  thickening, and diffuse anasarca, likely related to fluid third  spacing.

## 2021-07-23 NOTE — PROGRESS NOTES
"  Nephrology Progress Note  07/23/2021         Assessment & Recommendations:   Dax Villareal is a 30 year old year old male      Problem list  # RADHA secondary to multifactorial in the setting of severe necrotizing pancreatitis, HRS, sepsis, shock has been on renal replacement therapy for about 2 months. Via right tunneled dialysis.  #Hypervolemic hyponatremia  # Severe necrotizing pancreatitis  # History of septic shock secondary to severe necrotizing pancreatitis  # Decompensated liver cirrhosis  # SBP  # Right pleural effusion     He underwent iHD tomorrow and 2.5 L got removed. Today, he does not appear to be in significant volume overloaded except abdominal distension which is likely from his pancreatitis. He appears more somnolent today and tachypneic. I shared my concerns with the primary team today. I noted that there is a Rt lung opacity on the CXR which may represent the Rt pleural effusion. They may tap him. We will plan to run him again tomorrow and remove 1-2 L as tolerated. I think he is near his dry weight at this time.     Recommendations were communicated to primary team via note and verbal.     Elizabeth Torres MD     Interval History :   Nursing and provider notes from last 24 hours reviewed.  In the last 24 hours Dax Villareal underwent iHD with 2.5 L removed. His BP was soft overnight requiring Albumin. This morning he is more somnolent and tachypneic.     Review of Systems:   10 systems were reviewed and all negative except as mentioned above.     Physical Exam:   I/O last 3 completed shifts:  In: 780 [P.O.:100; Other:20; NG/GT:240]  Out: 2550 [Drains:50; Other:2500]   /53 (BP Location: Right arm)   Pulse 102   Temp 98  F (36.7  C) (Oral)   Resp 18   Ht 1.676 m (5' 6\")   Wt 78 kg (171 lb 15.3 oz)   SpO2 96%   BMI 27.75 kg/m       GENERAL APPEARANCE: Somnolent and Jaundice  EYES:  + Jaundice  HENT: Mouth without ulcers or lesions  PULM: lungs clear to auscultation bilaterally, equal " air movement, no clubbing  CV: regular rhythm, normal rate, no rub     -JVD no distended.      -edema only tracce  GI: soft,  Abdominal distension with mild tenderness.   INTEGUMENT: No rash  NEURO:  Lethargy, intermittently follows commands.   Access: Rt tunnel dialysis catheter    Labs:   All labs reviewed by me  Electrolytes/Renal - Recent Labs   Lab Test 07/23/21  0609 07/22/21  0552 07/21/21  1713 07/21/21  0550 07/17/21  0506 07/16/21  0616 07/15/21  0443 07/14/21  0345 07/14/21  0345 07/13/21  0718   * 129*  --  130*  --  130*  130* 131*  --  130* 131*   POTASSIUM 3.6 4.0  --  3.8  --  4.0 3.3*  --  4.2 3.6   CHLORIDE 98 96  --  97  --  99 98  --  98 98   CO2 24 21  --  24  --  20 23  --  20 23   BUN 22 43*  --  33*  --  39* 26  --  42* 29   CR 2.31* 3.31*  --  2.69*  --  3.23* 2.30*  --  3.35* 2.60*   GLC 92 76 98 110*   < > 151* 131*   < > 137* 94   JANENE 8.6 8.4*  --  8.3*  --  7.8* 8.1*  --  8.6 8.5   MAG  --   --   --   --   --   --  1.6  --  1.6 1.5*   PHOS 3.9 6.8*  --   --   --  2.0* 3.0  --  4.6* 4.3    < > = values in this interval not displayed.       CBC -   Recent Labs   Lab Test 07/23/21  0609 07/22/21  0552 07/21/21  1608 07/21/21  0550   WBC 21.1* 21.2*  --  21.7*   HGB 8.3* 8.6* 8.7* 6.6*    241  --  250       LFTs -   Recent Labs   Lab Test 07/23/21  0609 07/22/21  0552 07/21/21  0550   ALKPHOS 147 156* 175*   BILITOTAL 5.4* 6.1* 5.5*   ALT 12 14 15   AST 40 46* 47*   PROTTOTAL 6.9 6.8 6.7*   ALBUMIN 2.7* 2.1* 2.1*       Iron Panel -   Recent Labs   Lab Test 07/19/21  0632   IRON 31*   IRONSAT 43     Imaging:  I reviewed imaging studies.     Current Medications:    amylase-lipase-protease  2 capsule Oral TID w/meals     B and C vitamin Complex with folic acid  5 mL Per Feeding Tube Daily     cefTRIAXone  2 g Intravenous Q24H     folic acid  1 mg Oral Daily    Or     folic acid  1 mg Intravenous Daily     HYDROmorphone (STANDARD CONC)  2 mg Oral Once     lactulose  20 g Oral or NG  Tube 4x Daily     linezolid  600 mg Oral Q12H LENKA     melatonin  6 mg Oral At Bedtime     pantoprazole  40 mg Oral QAM AC     phytonadione  10 mg Intravenous Once     [Held by provider] potassium chloride  20 mEq Oral BID     rifaximin  550 mg Oral BID     sodium chloride (PF)  10 mL Irrigation Q8H     vitamin B1  100 mg Oral Daily    Or     thiamine  100 mg Intravenous Daily       - MEDICATION INSTRUCTIONS -       abigail Torres MD

## 2021-07-23 NOTE — PLAN OF CARE
Neuro: A&Ox4. Neuros intact. Able to make needs known, calls appropriately. Pt tired after HD this afternoon. BUE more tremory this evening, team aware.  Cardiac: -110s. SBPs soft 90-100s, team aware.   Respiratory: Sating >92% on RA-2L NC.  GI/: Anuric. Pt on HD. BM x1.  Diet/appetite: Tolerating regular diet, minimal PO intake. Calorie counts continue. Cycled TF 1p-7p after dialysis.  Activity:  Lift assist out of bed. Turns encouraged, repo when pt allows.  Pain: Prn dilaudid given x2, atarax x1 with relief.    Skin: No new deficits noted.  LDA's:  -L PICC: SL/TKO with abx  -L drain to gravity  -J tube: clamped  -G tube: clamped most of shift, now to gravity for pt comfort    Plan: Continue with POC. Notify primary team with changes.

## 2021-07-23 NOTE — PROGRESS NOTES
Calorie Count  Intake recorded for: 7/22  Total Kcals: 0 Total Protein: 0g  Kcals from Hospital Food: 0   Protein: 0g  Kcals from Outside Food (average):0 Protein: 0g  # Meals Recorded: 1 meal ordered from kitchen, no intake recorded  # Supplements Recorded: no intake recorded

## 2021-07-23 NOTE — PROGRESS NOTES
Resident/Fellow Attestation   I, Clarissa Cleveland, was present with the medical/NAHUM student who participated in the service and in the documentation of the note.  I have verified the history and personally performed the physical exam and medical decision making.  I agree with the assessment and plan of care as documented in the note.      Clarissa Cleveland MD  PGY1  Date of Service (when I saw the patient): 07/23/21    [facility]    Progress Note - Maroon 3 Service        Date of Admission:  6/28/2021    Assessment & Plan   Dax Villareal is a 30 year old male with hx of alcohol use disorder admitted on 6/28/2021 after recent prolonged admission at [facility] in [place] who has severe acute alcoholic hepatitis c/b HE, ESRD requiring HD, and malnutrition in the setting of acute necrotizing pancreatitis, s/p PEG-J placement on 7/13. He arrived with acute respiratory failure and septic shock that have resolved, and his SBP has been appropriately treated. He requires continued management for ESRD, hepatic encephalopathy, infected pancreatic pseudocyst, nutritional support via PEG-J, and secondary bacterial peritonitis.     Changes today:   - Bedside Ultrasound to evaluate for pleural effusion if he allows   - Care conference today  - Plan for dialysis tomorrow      # Necrotizing pancreatitis w/ infected pseudocyst s/p RP Drain and Cystogastrostomy, improving  # Septic Shock, resolved   # Bacterial Peritonitis   # Leukocytosis, stable  # Peripancreatic fluid collection growing VRE   Etiology of the septic shock that he came in with likely due to necrotizing pancreatitis, infected pseudocyst, and peritonitis. Had percutaneous RP drain placed for drainage of pancreatic collection. Diagnostic para returned with peritonitis.     Per discussion with ID, ongoing leukocytosis is not unexpected with nec panc. CT 7/18 showed unchanged moderate ascites and collection size. Cystgastrostomy on  7/21 with therapeutic para.     - Continue Ceftriaxone + Linezolid for peritonitis   - Daily CBC  - MAP goal > 65  - GI following, appreciate recs               -Pancreatic enzymes     - Received dose of Vit K this AM for INR >1.5   - For back pain: Tylenol PRN and Dilaudid 2mg q3h PRN  - Stopped cipro ppx, restart once off treatment for acute peritonitis       # Dyspnea iso pleural effusion v pulm edema  7/19 evening, patient developed increasing cough with thick, clear secretions. CXR w/ increased RLL haziness; ddx includes pleural effusion v pneumonia v atelectasis. Most likely effusion as patient is likely fluid up due to declining dialysis and therapeutic para; unlikely pneumonia as sputum is non-purulent and patient is afebrile. Sputum clx grew Enterococcus faecium, thought to be contaminant.      7/22, SOB is worse and repeat CXR showed increased diffuse haziness over the entire right lung, especially concentrated over the lower lobe. Likely hepatic hydrothorax; patient recently developed secondary bacterial peritonitis and it is likely that the infected fluid is also accumulating in the lungs. However, no acute rise in WBC, patient is afebrile and not appearing any more sick than usual on exam. Patient is on ceftriaxone and linezolid. SOB stable if not improved per patient today.      - Ultrasound to evaluate size of potential pleural effusion  - Consider thoracentesis if getting more symptomatic     # Severe malnutrition in setting of chronic illness  PEG-J placed 7/13. Procedure went well, patient and nutrition have discussed plan to run TFs during the day only from 12-6 pm at 70 mL/hr and work on PO intake. FWF 30 mL q4h. Will keep G tube open/vented.     - Continue Calorie Counts  - Dietitian consult for new tube feeds, appreciate recs              - Continue TFs at 70 mL/hr, 12-6 pm                - Cont panc enzymes  - Regular Diet, thin liquids per SLP  - Continue multivitamins  - Pantoprazole 40 mg  IV daily  - Zofran PRN     # Alcoholic hepatitis complicated by portal hypertension, ascites, and HE - improving   # Hx of Alcohol use disorder c/b withdrawal   # Anemia  # Coagulopathy   Patient with a history of marked alcohol use and alcoholic hepatitis in the past. Was given a course of steroids (1 week) during previous admission at OSH. Patient mental status has been stable, alert and oriented x4 for a couple weeks, well managed on lactulose regimen. Patient has consistently had 1-2 pRBC infusions weekly d/t Hgb drops of 0.3-0.5 every day (etilogy likely chronic liver disease, chronic kidney disease, phlebotomy, dilution, poor nutrition, ect). Hgb stable at 8.3.     - Lactulose 20g QID and PRN and rifaximin 550mg BID (titrate to 1.5L stool/day)  - Trend MELD labs  - Daily folic acid and thiamine  - Delirium precautions  - Melatonin 6mg at bedtime  - Daily CBC   - Transfuse if hemoglobin < 7, platelet < 10 or < 30 with bleeding  - Continue to monitor for signs of bleeding     MELD-Na score: 30 at 7/23/2021  6:09 AM  MELD score: 27 at 7/23/2021  6:09 AM  Calculated from:  Serum Creatinine: 2.31 mg/dL at 7/23/2021  6:09 AM  Serum Sodium: 131 mmol/L at 7/23/2021  6:09 AM  Total Bilirubin: 5.4 mg/dL at 7/23/2021  6:09 AM  INR(ratio): 1.80 at 7/23/2021  6:09 AM  Age: 30 years     # ESRD on HD (MWF)  # Hypervolemia causing acute respiratory failure, resolved  Nephrology note 7/21 indicates that patient has been on renal replacement therapy for two months and pt status has most likely transitioned from ARF  to ESRD at this point.          > Hemodialysis as per renal              - Midodrine 10 mg PRN before dialysis sessions     > Daily chemistry labs, strict I/O, and daily weights  - Aspiration precautions  - Supplemental oxygen as needed to keep sats above 89%     # Goals of Care  Please see additional interval note for in depth details regarding conversation on 7/9 about goals of care. Patient's long term prognosis  is still quite poor given his multi organ failure. Care conference on 7/15, pt did not wish to participate. Met with sisters Noni (on phone) and Melyssa and close friend Leroy with palliative, nephrology and general medicine teams today to discuss Dax's current improving status, poor prognosis and wishes to continue full cares. Plan to meet Fridays at 4 pm with teams and family for weekly care conferences.    - Hepatology confirmed pt will not be a transplant candidate     Diet: Snacks/Supplements Adult: Ensure Clear; With Meals  Fluid restriction 1200 ML FLUID  Adult Formula Drip Feeding: Specified Time Vital 1.5; Jejunostomy; Goal Rate: 70; mL/hr; From: 12:00 PM; 6:00 PM; Medication - Feeding Tube Flush Frequency: At least 15-30 mL water before and after medication administration and with tube clogging;...  Regular Diet Adult    DVT Prophylaxis: Pneumatic Compression Devices  Rdz Catheter: Not present  Fluids: 10 mL NS TKO  Central Lines: PRESENT  PICC Triple Lumen 06/28/21 Left-Site Assessment: WDL  CVC Double Lumen Right-Site Assessment: WDL  Code Status: Full Code      Disposition Plan   Expected discharge: 07/30/2021 recommended to transitional care unit once nutrition improved, infected pancreatic pseudocyst is well managed and plan for dialysis is in place.     The patient's care was discussed with the Attending Physician, Dr. South.    Jaime Amin, MS3  Medical Student  [address]  Securely message with the Vocera Web Console (learn more here)  Text page via [email or web address] Paging/Directory  Please see sign in/sign out for up to date coverage information  ___________________________________________________________________    Interval History   Overnight, MAP dropped to the 40s, given 50g albumin x1 and blood pressure normalized. Nursing notes reviewed.    This morning, patient continues to be very tired and was in more pain than usual d/t holding  pain meds overnight d/t low blood pressure. Aside from increased abdominal pain, patient otherwise feeling about the same. Dyspnea slightly improved from yesterday, no coughing.     Data reviewed today: I reviewed all medications, new labs and imaging results over the last 24 hours.     Physical Exam   Vital Signs: Temp: 98  F (36.7  C) Temp src: Oral BP: 114/53 Pulse: 102   Resp: 18 SpO2: 96 % O2 Device: Nasal cannula Oxygen Delivery: 2 LPM  Weight: 171 lbs 15.34 oz     Gen: Lying down in bed, appears uncomfortable. Tired, in more pain than usual but still communicative  HEENT: Conjunctival icterus  CV: Tachycardic. Regular rhythm.   Resp: Breathing comfortably on RA, no dyspnea while talking. No wheezing or coughing. Upper lobes CTA. Diminished breath sounds in RLL, but easier to hear/more pronounced than yesterday  Abd: Refused palpation today, PEG J in place without surrounding erythema   Skin: Jaundiced.  Ext: Well perfused, no edema  Neuro: Alert and oriented, responding appropriately to questions    Data   Recent Labs   Lab 07/23/21  0609 07/22/21  1426 07/22/21  0552 07/21/21  1713 07/21/21  1608 07/21/21  0550 07/21/21  0550   WBC 21.1*  --  21.2*  --   --   --  21.7*   HGB 8.3*  --  8.6*  --  8.7*  --  6.6*   MCV 95  --  94  --   --   --  96     --  241  --   --   --  250   INR 1.80* 1.77* 1.79*  --  1.67*   < > 1.75*   *  --  129*  --   --   --  130*   POTASSIUM 3.6  --  4.0  --   --   --  3.8   CHLORIDE 98  --  96  --   --   --  97   CO2 24  --  21  --   --   --  24   BUN 22  --  43*  --   --   --  33*   CR 2.31*  --  3.31*  --   --   --  2.69*   ANIONGAP 9  --  12  --   --   --  9   JANENE 8.6  --  8.4*  --   --   --  8.3*   GLC 92  --  76 98  --   --  110*   ALBUMIN 2.7*  --  2.1*  --   --   --  2.1*   PROTTOTAL 6.9  --  6.8  --   --   --  6.7*   BILITOTAL 5.4*  --  6.1*  --   --   --  5.5*   ALKPHOS 147  --  156*  --   --   --  175*   ALT 12  --  14  --   --   --  15   AST 40  --  46*  --    --   --  47*    < > = values in this interval not displayed.     No results found for this or any previous visit (from the past 24 hour(s)).

## 2021-07-23 NOTE — PROGRESS NOTES
Please review flowsheet for VS    Pt A/Ox4, pt hypotensive map 48, and MD notified. Pt received  50g of albumin to treat BP. Pt complained of pain 1mg dilaudid given after BP was stable. JG intact, G tube to gravity, and J tube clamped. Pt still on calorie count, reg diet, and TF [id number] (hang after dialysis). Pt anuric, 1 BM (watery), PIV intact, no new skin deficits noted.       Plan: Continue with POC. Notify primary team with changes.

## 2021-07-23 NOTE — PROGRESS NOTES
GASTROENTEROLOGY PROGRESS NOTE      Date of Admission: 6/28/2021  Reason for Admission: pancreatitis/hepatitis    ASSESSMENT/RECOMMENDATIONS:  30 year old male with a history of heavy alcohol use who presented to OSH 5/19 for abdominal pain, nausea and vomiting, admitted for alcohol hepatitis, ESLD, RADHA on HD, septic shock requiring pressors as well as necrotizing pancreatitis with large evolving necrotic collection. Transferred to [facility] for consideration of drainage of presumed infected necrotic collection. S/p EUS-guided necrosectomy (7/21/21).     #. Acute necrotizing pancreatitis with presumed infected walled off necrotic collection s/p percutaneous drainage (last 7/21/21)  #. Septic shock, resolved  #. Leukocytosis  #. Ascites/Peritonitis  Unclear evolution of pancreatitis history but had a recent CT scan with large evolving necrotic collection, presumed infected. No obvious gas in collection. CT on admission to [facility] showing mature collection with enhancing wall measuring ~[id number]. With profound coagulopathy (liver disease + malnutrition +sepsis), INR 3.8 on transfer he was given FFP and Vit K, IR placed perc drain 6/29. Repeat imaging 7/18 with still large walled off necrotic collection that does appear amenable to endoscopic transluminal drainage, underwent cystgastrostomy stenting with necrosectomy on 7/21/21 (see procedure notes for details), large amount of solid debris identified. Would recommend ongoing discussions with the patient regarding goals of care.  Etiology: alcohol  Date of onset: 5/19  BISAP score on admission: unclear  Concurrent organ failure: respiratory (now extubated), renal (on HD), liver, cardiogenic (weaned off pressors 6/29)  Thromboses: none  Diabetes: no  Current nutrition access: PEG-J  Last imaging: CT scan with IV contrast 7/18  Infection/antiinfectives: (Enterococcus faecium VRE from L RP flank drain 7/14)                - Meropenem (6/29 - 7/18)     - Ciprofloxacin (7/19  "- present)                - Vancomycin (6/29 - 6/30)     - Micafungin (6/29 - 7/1)   Intervention:    6/30 - Left IR RP perc drain (24F)    7/2 - Diagnostic paracentesis (WBC 2663 - 74% PMN, SAAG <1.1, protein 4.1)     7/5 - Repeat Paracentesis (WBC 1801 - 61% PMN, lipase 48)    7/13 - Diag/therapeutic paracentesis ()    7/13 - PEG-J placement with T-tag gastropexy    7/21 - Repeat paracentesis    7/21 - EUS-cystgastrostomy and necrosectomy, Axios with Viabil    - continue 10mg IV Vit K for three days since last procedure  - check INR for three days leading up to next intervention  - drain management per IR   - PRN paracentesis  - antibiotics per ID  - pain meds per primary team  - ADAT, regular diet is fine post-necrosectomy     #. Severe malnutrition in the context of chronic illness  #. S/p PEG-J placement  Patient with very poor oral intake over the course of his illness and was not meeting calorie requirements orally. S/p PEG-J with T-tag gastropexy (x3) 7/13.  - advance tube feeds as tolerated  - please start PERT (defer to dietitian for dosing)  - oral diet as tolerated (can ADAT after his necrosectomy)  - T-tags (white buttons) should be removed 2 weeks following procedure (~7/27) by lifting the button and cutting the underlying blue suture. Often times the buttons fall off prior to the 2 week louis.     #. Alcohol hepatitis  #. Hyperbilirubinemia  #. Coagulopathy  #. Encephalopathy  Known heavy alcohol use. Reportedly received course of steroids at OSH for 1 week with \"some improvement\". Currently has primarily hyperbilirubinemia and mildly elevated AST. Unclear if he has underlying cirrhosis but certainly would fit with alcohol hepatitis vs elevated liver tests of critical illness/sepsis. Not candidate for steroids right now. He is receiving lactulose and Xifaxin for encephalopathy but having a lot of stool output. Hepatology notified of admission, ongoing discussions with them. Not currently liver " "transplant candidate.  - trend MELD labs  - continue lactulose/Xifaxin and titrate to 3 soft stools per day -- please record stool output  - alcohol abstinence  - hepatology team following, appreciate support and expertise    This patient has a very poor overall prognosis and agree with palliative care consultation and GOC discussions with the family    The patient was seen and discussed and plan agreed upon with GI staff, Dr. Ramos Cifuentes MD  Gastroenterology Fellow  Division of Gastroenterology, Hepatology and Nutrition  [facility]  [phone]  See AMION/[facility] for GI on-call information    ____________________________________________      Subjective: Nursing notes and 24hr events reviewed. NAEO. Some chronic abdominal pain, though improved from previous since his procedure. Denies fevers, chils, nausea. He is hungry and is excited that his sister is bringing him food. No s/sx of GIB, had brown stool this AM.     ROS:   4 pt ROS negative unless noted in subjective.     Objective:  Blood pressure 114/53, pulse 102, temperature 98  F (36.7  C), temperature source Oral, resp. rate 18, height 1.676 m (5' 6\"), weight 78 kg (171 lb 15.3 oz), SpO2 96 %.  Gen: Laying in bed. Appears comfortable, fatigued. Reaching to drink from a cup of juice during our discussion.  HEENT: NCAT. Conjunctiva clear. Sclera ++icteric, poor dentition  CV: Peripheral perfusion intact  Resp: non labored breathing  Abd: moderate distention, minimal dark brown output in RP perc drain, GJ tube with dried blood but as previous, patient allowed abdomen exam with intentional vocalizations and guarding, no peritonitic signs  Msk: no gross deformity  Skin:  ++ jaundice  Ext: warm, well perfused  Mental status/Psych: alert and oriented, flat affect, appears fatigued    LABS:  BMP  Recent Labs   Lab 07/23/21  0609 07/22/21  0552 07/21/21  1713 07/21/21  1133 07/21/21  0550 07/20/21  0601   * 129*  --   --  130* 126*   POTASSIUM " 3.6 4.0  --   --  3.8 5.4*   CHLORIDE 98 96  --   --  97 94   JANENE 8.6 8.4*  --   --  8.3* 8.6   CO2 24 21  --   --  24 19*   BUN 22 43*  --   --  33* 62*   CR 2.31* 3.31*  --   --  2.69* 4.22*   GLC 92 76 98 108* 110* 103*     CBC  Recent Labs   Lab 07/23/21  0609 07/22/21  0552 07/21/21  0550 07/20/21  0601   WBC 21.1* 21.2* 21.7* 24.3*   RBC 2.68* 2.77* 2.13* 2.24*   HGB 8.3* 8.6* 6.6* 7.0*   HCT 25.4* 26.1* 20.5* 21.2*   MCV 95 94 96 95   MCH 31.0 31.0 31.0 31.3   MCHC 32.7 33.0 32.2 33.0   RDW 17.2* 17.7* 18.7* 18.6*    241 250 271     INR  Recent Labs   Lab 07/23/21  0609 07/22/21  1426 07/22/21  0552 07/21/21  1608   INR 1.80* 1.77* 1.79* 1.67*     LFTs  Recent Labs   Lab 07/23/21  0609 07/22/21  0552 07/21/21  0550 07/20/21  0601   ALKPHOS 147 156* 175* 180*   AST 40 46* 47* 50*   ALT 12 14 15 16   BILITOTAL 5.4* 6.1* 5.5* 6.3*   PROTTOTAL 6.9 6.8 6.7* 7.1   ALBUMIN 2.7* 2.1* 2.1* 2.1*      PANC  No lab results found in last 7 days.      IMAGING:  EXAMINATION: CT ABDOMEN PELVIS W CONTRAST, 7/18/2021 11:29 AM     TECHNIQUE:  Helical CT images from the lung bases through the  symphysis pubis were obtained with IV contrast. Contrast: 119 cc  [id number]      COMPARISON: 7/11/2021, 6/30/2021     HISTORY: Abdominal abscess/infection suspected     FINDINGS:     LUNG BASES: Normal heart size, trace pericardial effusion. Moderate  right pleural effusion with associated compressive atelectasis. Tiny  left basilar pleural effusion with associated compressive atelectasis.     ABDOMEN/PELVIS: Percutaneous gastrojejunostomy with tip in the  proximal jejunum. Unremarkable hepatic parenchyma. Ascites along the  inferior right hepatic lobe, extending into the pelvis and the left  paracolic gutter. Mild mural hyperemia of the gallbladder with mild  associated gallbladder thickening. Sequela of necrotizing pancreatitis  with associated 15 x 6 cm peripancreatic walled off necrosis with  drain in place. Overall, there is  improved mesenteric stranding  surrounding the necrotic collection. There is viable enhancement of  the uncinate process and pancreatic head as well as distal body and  tail. Spleen measures 16 cm. There is extensive reactive appearing  lymphadenopathy throughout the mesentery and retroperitoneum. There is  poor contrast uptake throughout the renal parenchyma. Visualized  ureters are normal. Normal caliber, edematous loops of small bowel.  Predominantly fluid-filled colon. Appendix is well visualized. Major  abdominal vasculature is patent. No concerning pelvic mass.     BONES: No acute or suspicious osseous abnormality.                                                                      IMPRESSION:  1. Continued sequelae of necrotizing pancreatitis with the 15 x 6 cm  peripancreatic walled off necrosis predominantly involving the lesser  sac with drain in place. Enhancement of the residual viable pancreatic  tissue in the uncinate, body, head, and the majority of the tail.  2. Poor parenchymal enhancement of the kidneys suggesting ATN.  3. Moderate amount of ascites with diffuse peritoneal enhancement and  dependant debris in the pelvis. Peritonitis cannot be ruled out.  4. Right pleural effusion with associated compressive atelectasis.  Trace left pleural effusion.

## 2021-07-23 NOTE — INTERIM SUMMARY
Palliative Care Interim Summary  At this time, goals of care remain consistently restorative.  We had become reinvolved with this patient earlier in the week when there were some questions regarding his desire to participate in several procedures.  He has since had repeat paracentesis and cysto- gastrostomy and necrosectomy.  Thus, at this time GOC/POC are clear.  Primary teams are managing any pain issues.  I note several comments in the chart about him wanting to get his sister documented as his healthcare agent.  As best I can tell there have been several attempts to locate a previous ACD.  If they remain unable to find that, he could simply fill out a new ACD form.  During my visits with him he has demonstrated capacity to make a decision as to who he wants for his healthcare agent.  Unit  could help him with this if needed.  Please reconsult if we can be of further assistance.  Thank you.    Zoltan Arenas MD  Palliative Medicine Consult Team  Pager: [phone]

## 2021-07-24 ENCOUNTER — APPOINTMENT (OUTPATIENT)
Dept: GENERAL RADIOLOGY | Facility: CLINIC | Age: 31
End: 2021-07-24
Attending: HOSPITALIST
Payer: MEDICAID

## 2021-07-24 LAB
ALBUMIN SERPL-MCNC: 2.4 G/DL (ref 3.4–5)
ALP SERPL-CCNC: 144 U/L (ref 40–150)
ALT SERPL W P-5'-P-CCNC: 10 U/L (ref 0–70)
ANION GAP SERPL CALCULATED.3IONS-SCNC: 9 MMOL/L (ref 3–14)
AST SERPL W P-5'-P-CCNC: 36 U/L (ref 0–45)
BILIRUB SERPL-MCNC: 5.6 MG/DL (ref 0.2–1.3)
BUN SERPL-MCNC: 32 MG/DL (ref 7–30)
CALCIUM SERPL-MCNC: 8.6 MG/DL (ref 8.5–10.1)
CHLORIDE BLD-SCNC: 98 MMOL/L (ref 94–109)
CO2 SERPL-SCNC: 23 MMOL/L (ref 20–32)
CREAT SERPL-MCNC: 3.32 MG/DL (ref 0.66–1.25)
ERYTHROCYTE [DISTWIDTH] IN BLOOD BY AUTOMATED COUNT: 16.7 % (ref 10–15)
GFR SERPL CREATININE-BSD FRML MDRD: 23 ML/MIN/1.73M2
GLUCOSE BLD-MCNC: 77 MG/DL (ref 70–99)
HCT VFR BLD AUTO: 26.3 % (ref 40–53)
HGB BLD-MCNC: 8.7 G/DL (ref 13.3–17.7)
INR PPP: 1.86 (ref 0.85–1.15)
LACTATE SERPL-SCNC: 1 MMOL/L (ref 0.7–2)
MCH RBC QN AUTO: 31.1 PG (ref 26.5–33)
MCHC RBC AUTO-ENTMCNC: 33.1 G/DL (ref 31.5–36.5)
MCV RBC AUTO: 94 FL (ref 78–100)
PHOSPHATE SERPL-MCNC: 5.8 MG/DL (ref 2.5–4.5)
PLATELET # BLD AUTO: 240 10E3/UL (ref 150–450)
POTASSIUM BLD-SCNC: 3.9 MMOL/L (ref 3.4–5.3)
PROT SERPL-MCNC: 7 G/DL (ref 6.8–8.8)
RBC # BLD AUTO: 2.8 10E6/UL (ref 4.4–5.9)
SODIUM SERPL-SCNC: 130 MMOL/L (ref 133–144)
WBC # BLD AUTO: 24.3 10E3/UL (ref 4–11)

## 2021-07-24 PROCEDURE — 71045 X-RAY EXAM CHEST 1 VIEW: CPT | Mod: 26 | Performed by: RADIOLOGY

## 2021-07-24 PROCEDURE — 250N000013 HC RX MED GY IP 250 OP 250 PS 637: Performed by: INTERNAL MEDICINE

## 2021-07-24 PROCEDURE — 258N000003 HC RX IP 258 OP 636: Performed by: INTERNAL MEDICINE

## 2021-07-24 PROCEDURE — 36592 COLLECT BLOOD FROM PICC: CPT

## 2021-07-24 PROCEDURE — 80053 COMPREHEN METABOLIC PANEL: CPT | Performed by: INTERNAL MEDICINE

## 2021-07-24 PROCEDURE — 258N000003 HC RX IP 258 OP 636: Performed by: HOSPITALIST

## 2021-07-24 PROCEDURE — 250N000013 HC RX MED GY IP 250 OP 250 PS 637: Performed by: HOSPITALIST

## 2021-07-24 PROCEDURE — 250N000011 HC RX IP 250 OP 636: Performed by: HOSPITALIST

## 2021-07-24 PROCEDURE — 250N000013 HC RX MED GY IP 250 OP 250 PS 637: Performed by: STUDENT IN AN ORGANIZED HEALTH CARE EDUCATION/TRAINING PROGRAM

## 2021-07-24 PROCEDURE — 71045 X-RAY EXAM CHEST 1 VIEW: CPT

## 2021-07-24 PROCEDURE — 90937 HEMODIALYSIS REPEATED EVAL: CPT

## 2021-07-24 PROCEDURE — 99233 SBSQ HOSP IP/OBS HIGH 50: CPT | Mod: GC | Performed by: HOSPITALIST

## 2021-07-24 PROCEDURE — 83605 ASSAY OF LACTIC ACID: CPT

## 2021-07-24 PROCEDURE — 250N000009 HC RX 250: Performed by: INTERNAL MEDICINE

## 2021-07-24 PROCEDURE — 85014 HEMATOCRIT: CPT | Performed by: INTERNAL MEDICINE

## 2021-07-24 PROCEDURE — 84100 ASSAY OF PHOSPHORUS: CPT | Performed by: INTERNAL MEDICINE

## 2021-07-24 PROCEDURE — 120N000005 HC R&B MS OVERFLOW UMMC

## 2021-07-24 PROCEDURE — 250N000011 HC RX IP 250 OP 636: Performed by: INTERNAL MEDICINE

## 2021-07-24 PROCEDURE — 85610 PROTHROMBIN TIME: CPT

## 2021-07-24 RX ADMIN — PHYTONADIONE 10 MG: 10 INJECTION, EMULSION INTRAMUSCULAR; INTRAVENOUS; SUBCUTANEOUS at 09:30

## 2021-07-24 RX ADMIN — THIAMINE HYDROCHLORIDE 100 MG: 100 INJECTION, SOLUTION INTRAMUSCULAR; INTRAVENOUS at 09:27

## 2021-07-24 RX ADMIN — Medication 550 MG: at 09:26

## 2021-07-24 RX ADMIN — MELATONIN TAB 3 MG 6 MG: 3 TAB at 00:05

## 2021-07-24 RX ADMIN — LINEZOLID 600 MG: 100 SUSPENSION ORAL at 21:16

## 2021-07-24 RX ADMIN — LINEZOLID 600 MG: 100 SUSPENSION ORAL at 09:26

## 2021-07-24 RX ADMIN — HYDROMORPHONE HYDROCHLORIDE 2 MG: 1 SOLUTION ORAL at 12:35

## 2021-07-24 RX ADMIN — Medication 40 MG: at 09:26

## 2021-07-24 RX ADMIN — SODIUM BICARBONATE 325 MG: 325 TABLET ORAL at 21:11

## 2021-07-24 RX ADMIN — HYDROXYZINE HYDROCHLORIDE 50 MG: 25 TABLET, FILM COATED ORAL at 16:17

## 2021-07-24 RX ADMIN — PANCRELIPASE 1 CAPSULE: 24000; 76000; 120000 CAPSULE, DELAYED RELEASE PELLETS ORAL at 21:11

## 2021-07-24 RX ADMIN — FOLIC ACID 1 MG: 1 TABLET ORAL at 09:27

## 2021-07-24 RX ADMIN — SODIUM CHLORIDE 300 ML: 9 INJECTION, SOLUTION INTRAVENOUS at 16:09

## 2021-07-24 RX ADMIN — LACTULOSE 20 G: 10 SOLUTION ORAL; RECTAL at 09:26

## 2021-07-24 RX ADMIN — CEFTRIAXONE SODIUM 2 G: 2 INJECTION, POWDER, FOR SOLUTION INTRAMUSCULAR; INTRAVENOUS at 21:12

## 2021-07-24 RX ADMIN — HYDROMORPHONE HYDROCHLORIDE 2 MG: 1 SOLUTION ORAL at 15:36

## 2021-07-24 RX ADMIN — Medication 550 MG: at 21:16

## 2021-07-24 RX ADMIN — PANCRELIPASE 2 CAPSULE: 24000; 76000; 120000 CAPSULE, DELAYED RELEASE PELLETS ORAL at 12:37

## 2021-07-24 RX ADMIN — HYDROXYZINE HYDROCHLORIDE 50 MG: 25 TABLET, FILM COATED ORAL at 04:33

## 2021-07-24 RX ADMIN — HYDROMORPHONE HYDROCHLORIDE 2 MG: 1 SOLUTION ORAL at 19:13

## 2021-07-24 RX ADMIN — MELATONIN TAB 3 MG 6 MG: 3 TAB at 22:48

## 2021-07-24 RX ADMIN — SODIUM CHLORIDE 250 ML: 9 INJECTION, SOLUTION INTRAVENOUS at 16:09

## 2021-07-24 RX ADMIN — Medication 5 ML: at 09:26

## 2021-07-24 RX ADMIN — PANCRELIPASE 2 CAPSULE: 24000; 76000; 120000 CAPSULE, DELAYED RELEASE PELLETS ORAL at 09:30

## 2021-07-24 RX ADMIN — HYDROMORPHONE HYDROCHLORIDE 2 MG: 1 SOLUTION ORAL at 06:27

## 2021-07-24 RX ADMIN — HYDROMORPHONE HYDROCHLORIDE 2 MG: 1 SOLUTION ORAL at 00:05

## 2021-07-24 RX ADMIN — HYDROXYZINE HYDROCHLORIDE 50 MG: 25 TABLET, FILM COATED ORAL at 22:48

## 2021-07-24 RX ADMIN — HYDROMORPHONE HYDROCHLORIDE 2 MG: 1 SOLUTION ORAL at 03:21

## 2021-07-24 RX ADMIN — HYDROMORPHONE HYDROCHLORIDE 2 MG: 1 SOLUTION ORAL at 23:07

## 2021-07-24 ASSESSMENT — ACTIVITIES OF DAILY LIVING (ADL)
ADLS_ACUITY_SCORE: 19

## 2021-07-24 ASSESSMENT — MIFFLIN-ST. JEOR: SCORE: 1692.75

## 2021-07-24 NOTE — PLAN OF CARE
Neuro: AxOx4, follows commands, moves ext (lowers moderately impaired, PERRLA 3 Brisk  Cardiac: 's, BP systolic 110-120's, afebrile  Resp: 5L NC sating >92%  GI/: Anuric, BMx1  Diet: Reg diet, tube feed off since 2000, pt did not want tube feeds running overnight for fear of n/v  Pain: Abdominal pain 7/10 dilaudid 2mg given Q3  Skin: No new deficits noted  LDA's: L PICC, L drain to gravity with minimal output, J-tube clamped, Gtube to gravity with minimal output.    Plan: Dialysis planned for today. Continue with POC, notify primary team with changes.

## 2021-07-24 NOTE — PROGRESS NOTES
Resident/Fellow Attestation   I, Clarissa Cleveland, was present with the medical/NAHUM student who participated in the service and in the documentation of the note.  I have verified the history and personally performed the physical exam and medical decision making.  I agree with the assessment and plan of care as documented in the note.      Clarissa Cleveland MD  PGY1  Date of Service (when I saw the patient): 07/24/21    [facility]    Progress Note - Maroon 3 Service        Date of Admission:  6/28/2021    Assessment & Plan   Dax Villareal is a 30 year old male with hx of alcohol use disorder admitted on 6/28/2021 after recent prolonged admission at [facility] in [place] who has severe acute alcoholic hepatitis c/b HE, ESRD requiring HD, and malnutrition in the setting of acute necrotizing pancreatitis, s/p PEG-J placement on 7/13. He arrived with acute respiratory failure and septic shock that have resolved, and his SBP has been appropriately treated. He requires continued management for ESRD, hepatic encephalopathy, infected pancreatic pseudocyst, nutritional support via PEG-J, and secondary bacterial peritonitis.     Today:  - Planned on bedside US to assess size of effusion yesterday but declined. Declined again today, tired from many procedures/dialysis, discussed potential of doing so tomorrow.   - Dialysis today   - Per ID recs, continue Ceftriaxone + Linezolid through tomorrow, switch to cipro for SBP ppx on 7/26     # Necrotizing pancreatitis w/ infected pseudocyst s/p RP Drain and Cystogastrostomy, improving  # Septic Shock, resolved   # Bacterial Peritonitis   # Leukocytosis, stable  # Peripancreatic fluid collection growing VRE   Etiology of the septic shock that he came in with likely due to necrotizing pancreatitis, infected pseudocyst, and peritonitis. Had percutaneous RP drain placed for drainage of pancreatic collection. Diagnostic para returned with  peritonitis.     Per discussion with ID, ongoing leukocytosis is not unexpected with nec panc. CT 7/18 showed unchanged moderate ascites and collection size. Cystgastrostomy on 7/21 with therapeutic para.     - Continue Ceftriaxone + Linezolid for peritonitis through 7/25  - Daily CBC  - MAP goal > 65  - GI following, appreciate recs               -Pancreatic enzymes                - Received dose of Vit K this AM for INR >1.5   - For back pain: Tylenol PRN and Dilaudid 2mg q3h PRN  - Restart cipro for SBP ppx on 7/26     # Dyspnea iso pleural effusion v pulm edema  7/19 evening, patient developed increasing cough with thick, clear secretions. CXR w/ increased RLL haziness; ddx includes pleural effusion v pneumonia v atelectasis. Most likely effusion as patient is likely fluid up due to declining dialysis and therapeutic para; unlikely pneumonia as sputum is non-purulent and patient is afebrile. Sputum clx grew Enterococcus faecium, thought to be contaminant.      7/22, repeat CXR showed increased diffuse haziness over the entire right lung. Likely hepatic hydrothorax; patient recently developed secondary bacterial peritonitis and it is likely that the infected fluid is also accumulating in the lungs. By 7/24, patient getting worse with increased WOB with use of accessory muscles, diminished lung sounds and increasing WBC count. Repeat CXR with increasing likely effusion.      - Ultrasound to evaluate size of potential pleural effusion  - Consider thoracentesis if getting more symptomatic     # Severe malnutrition in setting of chronic illness  PEG-J placed 7/13. Procedure went well, patient and nutrition have discussed plan to run TFs during the day only from 12-6 pm at 70 mL/hr and work on PO intake. FWF 30 mL q4h. Will keep G tube open/vented.      - Continue Calorie Counts  - Dietitian consult for new tube feeds, appreciate recs              - Continue TFs at 70 mL/hr, 12-6 pm                - Cont panc  enzymes  - Regular Diet, thin liquids per SLP  - Continue multivitamins  - Pantoprazole 40 mg IV daily  - Zofran PRN     # Alcoholic hepatitis complicated by portal hypertension, ascites, and HE - improving   # Hx of Alcohol use disorder c/b withdrawal   # Anemia  # Coagulopathy   Patient with a history of marked alcohol use and alcoholic hepatitis in the past. Was given a course of steroids (1 week) during previous admission at OSH. Patient mental status has been stable, alert and oriented x4 for a couple weeks, even off lactulose (patient has declined lactulose for multiple days). Patient has consistently had 1-2 pRBC infusions weekly d/t Hgb drops of 0.3-0.5 every day (etilogy likely chronic liver disease, chronic kidney disease, phlebotomy, dilution, poor nutrition, ect). Hgb stable at 8.3.      - Lactulose 20g QID and PRN and rifaximin 550mg BID - as patient allows  - Trend MELD labs  - Daily folic acid and thiamine  - Delirium precautions  - Melatonin 6mg at bedtime  - Daily CBC   - Transfuse if hemoglobin < 7, platelet < 10 or < 30 with bleeding  - Continue to monitor for signs of bleeding     MELD-Na score: 34 at 7/24/2021  7:00 AM  MELD score: 32 at 7/24/2021  7:00 AM  Calculated from:  Serum Creatinine: 3.32 mg/dL at 7/24/2021  7:00 AM  Serum Sodium: 130 mmol/L at 7/24/2021  7:00 AM  Total Bilirubin: 5.6 mg/dL at 7/24/2021  7:00 AM  INR(ratio): 1.86 at 7/24/2021  7:00 AM  Age: 30 years     # ESRD on HD (MWF)  # Hypervolemia causing acute respiratory failure, resolved  Nephrology note 7/21 indicates that patient has been on renal replacement therapy for two months and pt status has most likely transitioned from ARF  to ESRD at this point.          > Hemodialysis as per renal              - Midodrine 10 mg PRN before dialysis sessions     > Daily chemistry labs, strict I/O, and daily weights  - Aspiration precautions  - Supplemental oxygen as needed to keep sats above 89%     # Goals of Care  Please see  additional interval note for in depth details regarding conversation on 7/9 about goals of care. Patient's long term prognosis is still quite poor given his multi organ failure. Care conference on 7/15, pt did not wish to participate. Met with sisters Noni (on phone) and Melyssa and close friend Leroy with palliative, nephrology and general medicine teams today to discuss Dax's current improving status, poor prognosis and wishes to continue full cares. Plan to meet Fridays at 4 pm with teams and family for weekly updates.     - Hepatology confirmed pt will not be a transplant candidate     Diet: Snacks/Supplements Adult: Ensure Clear; With Meals  Fluid restriction 1200 ML FLUID  Adult Formula Drip Feeding: Specified Time Vital 1.5; Jejunostomy; Goal Rate: 70; mL/hr; From: 12:00 PM; 6:00 PM; Medication - Feeding Tube Flush Frequency: At least 15-30 mL water before and after medication administration and with tube clogging;...  Regular Diet Adult    DVT Prophylaxis: Pneumatic Compression Devices  Rdz Catheter: Not present  Fluids: 10 mL NS TKO  Central Lines: PRESENT  PICC Triple Lumen 06/28/21 Left-Site Assessment: WDL  CVC Double Lumen Right-Site Assessment: WDL  Code Status: Full Code      Disposition Plan   Expected discharge: >7 days, recommended to transitional care unit once nutrition improved, infected pancreatic pseudocyst is well managed and plan for dialysis is in place.     The patient's care was discussed with the Attending Physician, Dr. South.    Jaiem Amin, MS3  Medical Student  [address]  Securely message with the Vocera Web Console (learn more here)  Text page via [email or web address] Paging/Directory  Please see sign in/sign out for up to date coverage information  __________________________________________________________________    Interval History   Patient triggered sepsis yesterday, given 50g albumin. Blood pressures consistently higher  "and stable now. Nursing notes reviewed.    This morning, patient looks sicker than usual. He states his breathing is \"fine,\" though WOB has increased. He is more tired and tremulous than usual. No vomiting. Continued abdominal pain.    Data reviewed today: I reviewed all medications, new labs and imaging results over the last 24 hours.    Physical Exam   Vital Signs: Temp: 97.9  F (36.6  C) Temp src: Oral BP: 113/50 Pulse: 105   Resp: 18 SpO2: 92 % O2 Device: Nasal cannula Oxygen Delivery: 4 LPM  Weight: 175 lbs 4.25 oz  Gen: Lying down in bed, appears more sick than usual. Unable to communicate very well d/t severe fatigue  HEENT: Conjunctival icterus  CV: Tachycardic. Regular rhythm.   Resp: Increased WOB with bigger breaths, using accessory muscles. Dyspnea with short phrases. No wheezing or coughing. Upper lobes CTA. Did not appreciate breath sounds on the lateral RLL. Diminished breath sounds in anterior RLL, more so than yesterday  Abd: Refused palpation today, PEG J in place without surrounding erythema   Skin: Jaundiced.  Ext: Well perfused, no edema  Neuro: Oriented x3, responding appropriately to questions but very tremulous, increased significantly from earlier this week    Data   Recent Labs   Lab 07/24/21  0700 07/23/21  0609 07/22/21  1426 07/22/21  0552   WBC 24.3* 21.1*  --  21.2*   HGB 8.7* 8.3*  --  8.6*   MCV 94 95  --  94    202  --  241   INR 1.86* 1.80* 1.77* 1.79*   * 131*  --  129*   POTASSIUM 3.9 3.6  --  4.0   CHLORIDE 98 98  --  96   CO2 23 24  --  21   BUN 32* 22  --  43*   CR 3.32* 2.31*  --  3.31*   ANIONGAP 9 9  --  12   JANENE 8.6 8.6  --  8.4*   GLC 77 92  --  76   ALBUMIN 2.4* 2.7*  --  2.1*   PROTTOTAL 7.0 6.9  --  6.8   BILITOTAL 5.6* 5.4*  --  6.1*   ALKPHOS 144 147  --  156*   ALT 10 12  --  14   AST 36 40  --  46*     No results found for this or any previous visit (from the past 24 hour(s)).  "

## 2021-07-24 NOTE — PLAN OF CARE
[id number]:  Neuro: A&Ox4. Neuros intact. Tremors.  Cardiac: -110s. SBPs 110s-120s. Latest MAP 81.  Triggered sepsis protocol this afternoon, lactic acid 0.9.  Respiratory: Sating >92% on RA-2L NC. CPAP for nighttime.  GI/: Anuric. Pt on HD, next run tomorrow. BM x1.  Diet/appetite: Regular diet, no PO intake today.  Cycled TF supposed to run from noon - 1800. Pt declined to start until 1700, so will run until 2300 tonight.  Activity:  Lift assist out of bed. Sometimes declines repositioning.  Pain: Prn dilaudid, atarax given with relief.    Skin: No new deficits noted.  LDA's:  -L PICC: SL/TKO with abx  -L drain to gravity  -J tube: clamped  -G tube: clamped after oral meds. Minimal output while to gravity today.     Plan: Continue with POC. Notify primary team with changes/concerns.

## 2021-07-24 NOTE — PROGRESS NOTES
Nephrology Progress Note  07/24/2021         Assessment & Recommendations:   Dax Villareal is a 30 year old year old male      Problem list  # RADHA secondary to multifactorial in the setting of severe necrotizing pancreatitis, HRS, sepsis, shock has been on renal replacement therapy for about 2 months. Via right tunneled dialysis.  #Hypervolemic hyponatremia  # Severe necrotizing pancreatitis  # History of septic shock secondary to severe necrotizing pancreatitis  # Decompensated liver cirrhosis  # SBP  # Right pleural effusion     Peripherally, he does not appears overloaded. However, he has significant abdominal distension that can impair his lung mechanics. We will dialyze him today with the goal of fluid pull minimal at 0.5- 1.5 L as the patient has poor po intake. I strongly encouraged the patient to consider having Rt lung opacity evaluated  And if there is effusion he needs thoracentesis. Overall, he does not head toward the right direction.     Recommendations were communicated to primary team via note and verbal.     Elizabeth Torres MD     Interval History :   Nursing and provider notes from last 24 hours reviewed.  In the last 24 hours Dax Villareal, he is noted to have increased WOB. This morning he is still somnolent. I discussed dialysis plan with him for which he is initially hesitated but later agree if we run him in the afternoon. I met with the medical student who took care of him and discussed about the plan for Rt thoracentesis. It appears that the patient refused to have it evaluated. I shared with him again my concerns re; his respiratory status and worsening leukocytosis but he is quite tired and want to rest.     Review of Systems:   10 systems were reviewed and all negative except as mentioned above.     Physical Exam:   I/O last 3 completed shifts:  In: 1140 [P.O.:640; I.V.:100; Other:40; NG/GT:150]  Out: 85 [Drains:85]   /56 (BP Location: Right arm)   Pulse 110   Temp 97.6  F (36.4  " C) (Oral)   Resp 18   Ht 1.676 m (5' 6\")   Wt 79.5 kg (175 lb 4.3 oz)   SpO2 96%   BMI 28.29 kg/m       GENERAL APPEARANCE: Somnolent and Jaundice  EYES:  + Jaundice  HENT: Mouth without ulcers or lesions  PULM: lungs clear to auscultation bilaterally, equal air movement, no clubbing; increased WOB.  CV: regular rhythm, normal rate, no rub     -JVD no distended.      -edema only tracce  GI: soft,  Abdominal distension. + G-tube at upper mid abdomen; surrounding skin appears dry and intact.  INTEGUMENT: No rash  NEURO:  Lethargy, intermittently follows commands.   Access: Rt tunnel dialysis catheter    Labs:   All labs reviewed by me  Electrolytes/Renal -   Recent Labs   Lab Test 07/24/21  0700 07/23/21  0609 07/22/21  0552 07/15/21  0811 07/15/21  0443 07/14/21  0345 07/14/21  0345 07/13/21  0718   * 131* 129*   < > 131*  --  130* 131*   POTASSIUM 3.9 3.6 4.0   < > 3.3*  --  4.2 3.6   CHLORIDE 98 98 96   < > 98  --  98 98   CO2 23 24 21   < > 23  --  20 23   BUN 32* 22 43*   < > 26  --  42* 29   CR 3.32* 2.31* 3.31*   < > 2.30*  --  3.35* 2.60*   GLC 77 92 76   < > 131*   < > 137* 94   JANENE 8.6 8.6 8.4*   < > 8.1*  --  8.6 8.5   MAG  --   --   --   --  1.6  --  1.6 1.5*   PHOS 5.8* 3.9 6.8*   < > 3.0  --  4.6* 4.3    < > = values in this interval not displayed.       CBC -   Recent Labs   Lab Test 07/24/21  0700 07/23/21  0609 07/22/21  0552   WBC 24.3* 21.1* 21.2*   HGB 8.7* 8.3* 8.6*    202 241       LFTs -   Recent Labs   Lab Test 07/24/21  0700 07/23/21  0609 07/22/21  0552   ALKPHOS 144 147 156*   BILITOTAL 5.6* 5.4* 6.1*   ALT 10 12 14   AST 36 40 46*   PROTTOTAL 7.0 6.9 6.8   ALBUMIN 2.4* 2.7* 2.1*       Iron Panel -   Recent Labs   Lab Test 07/19/21  0632   IRON 31*   IRONSAT 43     Imaging:  I reviewed imaging studies.     Current Medications:    sodium chloride 0.9%  250 mL Intravenous Once in dialysis/CRRT     sodium chloride 0.9%  300 mL Hemodialysis Machine Once     " amylase-lipase-protease  2 capsule Oral TID w/meals     B and C vitamin Complex with folic acid  5 mL Per Feeding Tube Daily     cefTRIAXone  2 g Intravenous Q24H     folic acid  1 mg Oral Daily    Or     folic acid  1 mg Intravenous Daily     HYDROmorphone (STANDARD CONC)  2 mg Oral Once     lactulose  20 g Oral or NG Tube 4x Daily     linezolid  600 mg Oral Q12H LENKA     melatonin  6 mg Oral At Bedtime     - MEDICATION INSTRUCTIONS -   Does not apply Once     pantoprazole  40 mg Oral QAM AC     [Held by provider] potassium chloride  20 mEq Oral BID     rifaximin  550 mg Oral BID     sodium chloride (PF)  10 mL Irrigation Q8H     vitamin B1  100 mg Oral Daily    Or     thiamine  100 mg Intravenous Daily       - MEDICATION INSTRUCTIONS -       abigail Torres MD

## 2021-07-25 LAB
ALBUMIN SERPL-MCNC: 2.3 G/DL (ref 3.4–5)
ALP SERPL-CCNC: 169 U/L (ref 40–150)
ALT SERPL W P-5'-P-CCNC: 12 U/L (ref 0–70)
ANION GAP SERPL CALCULATED.3IONS-SCNC: 9 MMOL/L (ref 3–14)
AST SERPL W P-5'-P-CCNC: 38 U/L (ref 0–45)
BILIRUB SERPL-MCNC: 5.1 MG/DL (ref 0.2–1.3)
BUN SERPL-MCNC: 20 MG/DL (ref 7–30)
CALCIUM SERPL-MCNC: 8.1 MG/DL (ref 8.5–10.1)
CHLORIDE BLD-SCNC: 99 MMOL/L (ref 94–109)
CO2 SERPL-SCNC: 24 MMOL/L (ref 20–32)
CREAT SERPL-MCNC: 2.32 MG/DL (ref 0.66–1.25)
ERYTHROCYTE [DISTWIDTH] IN BLOOD BY AUTOMATED COUNT: 16.7 % (ref 10–15)
GFR SERPL CREATININE-BSD FRML MDRD: 36 ML/MIN/1.73M2
GLUCOSE BLD-MCNC: 90 MG/DL (ref 70–99)
HCT VFR BLD AUTO: 25.5 % (ref 40–53)
HGB BLD-MCNC: 8.3 G/DL (ref 13.3–17.7)
INR PPP: 1.85 (ref 0.85–1.15)
LACTATE SERPL-SCNC: 1.9 MMOL/L (ref 0.7–2)
MCH RBC QN AUTO: 30.6 PG (ref 26.5–33)
MCHC RBC AUTO-ENTMCNC: 32.5 G/DL (ref 31.5–36.5)
MCV RBC AUTO: 94 FL (ref 78–100)
PHOSPHATE SERPL-MCNC: 3.7 MG/DL (ref 2.5–4.5)
PLATELET # BLD AUTO: 208 10E3/UL (ref 150–450)
POTASSIUM BLD-SCNC: 3.7 MMOL/L (ref 3.4–5.3)
PROT SERPL-MCNC: 7 G/DL (ref 6.8–8.8)
RBC # BLD AUTO: 2.71 10E6/UL (ref 4.4–5.9)
SODIUM SERPL-SCNC: 132 MMOL/L (ref 133–144)
WBC # BLD AUTO: 24.5 10E3/UL (ref 4–11)

## 2021-07-25 PROCEDURE — 80053 COMPREHEN METABOLIC PANEL: CPT | Performed by: INTERNAL MEDICINE

## 2021-07-25 PROCEDURE — 85014 HEMATOCRIT: CPT | Performed by: INTERNAL MEDICINE

## 2021-07-25 PROCEDURE — 250N000013 HC RX MED GY IP 250 OP 250 PS 637: Performed by: STUDENT IN AN ORGANIZED HEALTH CARE EDUCATION/TRAINING PROGRAM

## 2021-07-25 PROCEDURE — 250N000013 HC RX MED GY IP 250 OP 250 PS 637: Performed by: INTERNAL MEDICINE

## 2021-07-25 PROCEDURE — 250N000009 HC RX 250: Performed by: INTERNAL MEDICINE

## 2021-07-25 PROCEDURE — 250N000013 HC RX MED GY IP 250 OP 250 PS 637: Performed by: HOSPITALIST

## 2021-07-25 PROCEDURE — 83605 ASSAY OF LACTIC ACID: CPT | Performed by: INTERNAL MEDICINE

## 2021-07-25 PROCEDURE — 99232 SBSQ HOSP IP/OBS MODERATE 35: CPT | Performed by: INTERNAL MEDICINE

## 2021-07-25 PROCEDURE — 250N000013 HC RX MED GY IP 250 OP 250 PS 637

## 2021-07-25 PROCEDURE — 250N000011 HC RX IP 250 OP 636: Performed by: INTERNAL MEDICINE

## 2021-07-25 PROCEDURE — 36592 COLLECT BLOOD FROM PICC: CPT | Performed by: INTERNAL MEDICINE

## 2021-07-25 PROCEDURE — 99207 PR CDG-CUT & PASTE-POTENTIAL IMPACT ON LEVEL: CPT | Performed by: HOSPITALIST

## 2021-07-25 PROCEDURE — 99233 SBSQ HOSP IP/OBS HIGH 50: CPT | Mod: GC | Performed by: HOSPITALIST

## 2021-07-25 PROCEDURE — 85610 PROTHROMBIN TIME: CPT

## 2021-07-25 PROCEDURE — 84100 ASSAY OF PHOSPHORUS: CPT

## 2021-07-25 PROCEDURE — 120N000005 HC R&B MS OVERFLOW UMMC

## 2021-07-25 RX ADMIN — THIAMINE HCL TAB 100 MG 100 MG: 100 TAB at 11:58

## 2021-07-25 RX ADMIN — MELATONIN TAB 3 MG 6 MG: 3 TAB at 21:12

## 2021-07-25 RX ADMIN — Medication 5 ML: at 11:59

## 2021-07-25 RX ADMIN — PANCRELIPASE 1 CAPSULE: 24000; 76000; 120000 CAPSULE, DELAYED RELEASE PELLETS ORAL at 01:17

## 2021-07-25 RX ADMIN — HYDROMORPHONE HYDROCHLORIDE 2 MG: 1 SOLUTION ORAL at 04:52

## 2021-07-25 RX ADMIN — ONDANSETRON 4 MG: 4 TABLET, ORALLY DISINTEGRATING ORAL at 09:58

## 2021-07-25 RX ADMIN — Medication 550 MG: at 21:07

## 2021-07-25 RX ADMIN — FOLIC ACID 1 MG: 1 TABLET ORAL at 11:59

## 2021-07-25 RX ADMIN — Medication 40 MG: at 11:58

## 2021-07-25 RX ADMIN — HYDROMORPHONE HYDROCHLORIDE 2 MG: 1 SOLUTION ORAL at 02:25

## 2021-07-25 RX ADMIN — LINEZOLID 600 MG: 100 SUSPENSION ORAL at 21:07

## 2021-07-25 RX ADMIN — HYDROMORPHONE HYDROCHLORIDE 2 MG: 1 SOLUTION ORAL at 15:53

## 2021-07-25 RX ADMIN — HYDROMORPHONE HYDROCHLORIDE 2 MG: 1 SOLUTION ORAL at 18:44

## 2021-07-25 RX ADMIN — LINEZOLID 600 MG: 100 SUSPENSION ORAL at 11:59

## 2021-07-25 RX ADMIN — SODIUM BICARBONATE 325 MG: 325 TABLET ORAL at 01:17

## 2021-07-25 RX ADMIN — Medication 550 MG: at 11:58

## 2021-07-25 RX ADMIN — HYDROMORPHONE HYDROCHLORIDE 2 MG: 1 SOLUTION ORAL at 09:58

## 2021-07-25 RX ADMIN — BENZONATATE 100 MG: 100 CAPSULE ORAL at 18:39

## 2021-07-25 RX ADMIN — LACTULOSE 20 G: 10 SOLUTION ORAL; RECTAL at 21:07

## 2021-07-25 ASSESSMENT — ACTIVITIES OF DAILY LIVING (ADL)
ADLS_ACUITY_SCORE: 19

## 2021-07-25 ASSESSMENT — MIFFLIN-ST. JEOR
SCORE: 1702.75
SCORE: 1709.75

## 2021-07-25 NOTE — PLAN OF CARE
Neuro: A&Ox4. Lethargic. Able to make needs known.   Cardiac: ST 110s. VSS.   Respiratory: Sating 97% on 2L NC, 90% on 1L NC. Reports feeling somewhat SOB. Lungs diminished, fine crackles on L.  GI/: Anuric, on HD. No BM this shift. L flank drain with small bile/brown OP. Intermittent nausea, gave Zofran once.  Diet/appetite: Reg diet. Poor appetite. Able to drink fluids. Encouraged PO intake. TF started at 2:30 per patient request.  Activity:  Up with lift, encourage ROM in bed.  Pain: At acceptable level on current regimen. Gave dilaudid once.  Skin: No new deficits noted. Pulsate mattress, turning schedule.   LDA's: PICC. CVC for HD. PEG. L flank drain.    Plan: Continue with POC. Notify primary team with changes.

## 2021-07-25 NOTE — PLAN OF CARE
Plan of Care [id number]  Neuro:    - A&Ox4   - Patient Scored [place] Stage 0.     - Patient refusing Scheduled Lactulose, 4 Loose BM's charged on 7/24.     - Patient educated on Lactulose and prior     Cardiac:    - Rhythm: ST   - VSS.     Respiratory:    - SPO2 > 90% on 6L NOC.    :   - No Urine Output   - Pt on Hemodialysis.    GI:   - 4 Loose BM's on 7/24.     Diet/appetite:    - Regular Diet, Loose Stools Occurring   - Tube Feeds from 9pm-3am as patient at Dialysis during day shift.    Activity:     - Patient up with Lift   - Bed Alarm Active.    Pain:    - At acceptable level on current regimen.     Skin:    - Patient encouraged to turn, Blanchable Redness on Sacrum.    LDA's:   - 3 Lumen PICC.    Plan:    - Continue with POC.    - Notify primary team with changes.

## 2021-07-25 NOTE — PROGRESS NOTES
Resident/Fellow Attestation   I, Clarissa Cleveland, was present with the medical/NAHUM student who participated in the service and in the documentation of the note.  I have verified the history and personally performed the physical exam and medical decision making.  I agree with the assessment and plan of care as documented in the note.    Clarissa Cleveland MD  PGY1  Date of Service (when I saw the patient): 07/25/21    [facility]    Progress Note - Maroon 3 Service        Date of Admission:  6/28/2021    Assessment & Plan   Dax Villareal is a 30 year old male with hx of alcohol use disorder admitted on 6/28/2021 after recent prolonged admission at [facility] in [place] who has severe acute alcoholic hepatitis c/b HE, ESRD requiring HD, and malnutrition in the setting of acute necrotizing pancreatitis, s/p PEG-J placement on 7/13. He arrived with acute respiratory failure and septic shock that have resolved, and his SBP has been appropriately treated. He requires continued management for ESRD, hepatic encephalopathy, infected pancreatic pseudocyst, nutritional support via PEG-J, and secondary bacterial peritonitis.     Today:  - Again, planning for bedside US to assess size of effusion but for now is still refusing   - Per ID recs, continue Ceftriaxone + Linezolid through today     # Necrotizing pancreatitis w/ infected pseudocyst s/p RP Drain and Cystogastrostomy, improving  # Septic Shock, resolved   # Bacterial Peritonitis   # Leukocytosis, stable  # Peripancreatic fluid collection growing VRE   Etiology of the septic shock that he came in with likely due to necrotizing pancreatitis, infected pseudocyst, and peritonitis. Had percutaneous RP drain placed for drainage of pancreatic collection. Diagnostic para returned with peritonitis.     Per discussion with ID, ongoing leukocytosis is not unexpected with nec panc. CT 7/18 showed unchanged moderate ascites and collection  size. Cystgastrostomy on 7/21 with therapeutic para.     - Continue Ceftriaxone + Linezolid for peritonitis through 7/25  - Daily CBC  - MAP goal > 65  - GI following, appreciate recs               -Pancreatic enzymes    - For back pain: Tylenol PRN and Dilaudid 2mg q3h PRN  - As per ID - plan to restart cipro for SBP ppx on 7/26     # Dyspnea iso pleural effusion v pulm edema  7/19 evening, patient developed increasing cough with thick, clear secretions. CXR w/ increased RLL haziness; ddx includes pleural effusion v pneumonia v atelectasis. Most likely effusion as patient is likely fluid up due to declining dialysis and therapeutic para; unlikely pneumonia as sputum is non-purulent and patient is afebrile. Sputum clx grew Enterococcus faecium, thought to be contaminant.      7/22, repeat CXR showed increased diffuse haziness over the entire right lung. Likely hepatic hydrothorax; patient recently developed secondary bacterial peritonitis and it is likely that the infected fluid is also accumulating in the lungs.Ongoing increased WOB with use of accessory muscles, diminished lung sounds. Repeat CXR with increasing effusion.      - Ultrasound to evaluate size of potential pleural effusion if he allows, would likely benefit from thora    - if thora obtained will send cell count, gram stain, cultures      # Severe malnutrition in setting of chronic illness  PEG-J placed 7/13. Procedure went well, patient and nutrition have discussed plan to run TFs during the day only from 12-6 pm at 70 mL/hr and work on PO intake, he intermittently refuses these however.      - Dietitian consult for new tube feeds, appreciate recs              - Continue TFs at 70 mL/hr, 12-6 pm (though intermittently refusing)                - Cont panc enzymes  - Regular Diet, thin liquids per SLP  - Continue multivitamins  - Pantoprazole 40 mg IV daily  - Zofran PRN     # Alcoholic hepatitis complicated by portal hypertension, ascites, and HE -  improving   # Hx of Alcohol use disorder c/b withdrawal   # Anemia  # Coagulopathy   Patient with a history of marked alcohol use and alcoholic hepatitis in the past. Was given a course of steroids (1 week) during previous admission at OSH. Patient mental status has been stable, alert and oriented x4 for a couple weeks. Patient has consistently had 1-2 pRBC infusions weekly d/t Hgb drops of 0.3-0.5 every day (etilogy likely chronic liver disease, chronic kidney disease, phlebotomy, dilution, poor nutrition, ect). Hgb stable at 8.3.      - Lactulose 20g QID and PRN and rifaximin 550mg BID - as patient allows  - Trend MELD labs  - Daily folic acid and thiamine  - Delirium precautions  - Melatonin 6mg at bedtime  - Daily CBC   - Transfuse if hemoglobin < 7, platelet < 10 or < 30 with bleeding  - Continue to monitor for signs of bleeding     MELD-Na score: 30 at 7/25/2021  5:53 AM  MELD score: 28 at 7/25/2021  5:53 AM  Calculated from:  Serum Creatinine: 2.32 mg/dL at 7/25/2021  5:53 AM  Serum Sodium: 132 mmol/L at 7/25/2021  5:53 AM  Total Bilirubin: 5.1 mg/dL at 7/25/2021  5:53 AM  INR(ratio): 1.85 at 7/25/2021  5:53 AM  Age: 30 years     # ESRD on HD (MWF)  # Hypervolemia causing acute respiratory failure, resolved  Nephrology note 7/21 indicates that patient has been on renal replacement therapy for two months and pt status has most likely transitioned from ARF  to ESRD at this point.          > Hemodialysis as per renal (MWF)              - Midodrine 10 mg PRN before dialysis sessions     > Daily chemistry labs, strict I/O, and daily weights  - Aspiration precautions  - Supplemental oxygen as needed to keep sats above 89%     # Goals of Care  Please see additional interval note for in depth details regarding conversation on 7/9 about goals of care. Patient's long term prognosis is still quite poor given his multi organ failure. Care conference on 7/15, pt did not wish to participate. Met with sisters Noni (on  phone) and Melyssa and close friend Leroy with palliative, nephrology and general medicine teams today to discuss Dax's current improving status, poor prognosis and wishes to continue full cares. Plan to meet Fridays at 4 pm with teams and family for weekly updates.     - Hepatology confirmed pt will not be a transplant candidate       Diet: Snacks/Supplements Adult: Ensure Clear; With Meals  Fluid restriction 1200 ML FLUID  Adult Formula Drip Feeding: Specified Time Vital 1.5; Jejunostomy; Goal Rate: 70; mL/hr; From: 12:00 PM; 6:00 PM; Medication - Feeding Tube Flush Frequency: At least 15-30 mL water before and after medication administration and with tube clogging;...  Regular Diet Adult    DVT Prophylaxis: Pneumatic Compression Devices  Rdz Catheter: Not present  Fluids: 10 mL NS TKO  Central Lines: PRESENT  PICC Triple Lumen 06/28/21 Left-Site Assessment: WDL  CVC Double Lumen Right-Site Assessment: WDL  Code Status: Full Code      Disposition Plan   Expected discharge: 07/30/2021 recommended to transitional care unit once nutrition improved, infected pancreatic pseudocyst is well managed and plan for dialysis is in place.     The patient's care was discussed with the Attending Physician, Dr. South.    Jaime Amin, MS3  Medical Student  [address]  Securely message with the Vocera Web Console (learn more here)  Text page via [employer] Paging/Directory  Please see sign in/sign out for up to date coverage information  ____________________________________________________________________    Interval History   No acute events overnight. Nursing notes reviewed. Up to O2 5L/min.     This morning, patient appears similarly uncomfortable/with shortness of breath compared to yesterday. No nausea/vomiting, continued abdominal pain and fatigue. Pain adequately managed on Dilaudid.    Data reviewed today: I reviewed all medications, new labs and imaging  results over the last 24 hours. I personally reviewed the chest x-ray image(s) showing worsening pleural effusion and associated atelectasis.    Physical Exam   Vital Signs: Temp: 98.2  F (36.8  C) Temp src: Oral BP: 114/47 Pulse: 111   Resp: 20 SpO2: 94 % O2 Device: Nasal cannula Oxygen Delivery: 2 LPM  Weight: 179 lbs .22 oz    Gen: Lying down in bed, appears similar to yesterday. Unable to communicate very well d/t severe fatigue  HEENT: Conjunctival icterus  CV: Tachycardic. Regular rhythm.   Resp: Increased WOB with bigger breaths, using accessory muscles. Dyspnea with 1-2 words. No wheezing or coughing. Upper lobes CTA. Diminished breath sounds RLL.  Abd: Refused palpation today, PEG J in place without surrounding erythema   Skin: Jaundiced.  Ext: Well perfused, no edema  Neuro: Oriented x3, responding appropriately to questions, tremulous    Data   Recent Labs   Lab 07/25/21  0553 07/24/21  0700 07/23/21  0609   WBC 24.5* 24.3* 21.1*   HGB 8.3* 8.7* 8.3*   MCV 94 94 95    240 202   INR 1.85* 1.86* 1.80*   * 130* 131*   POTASSIUM 3.7 3.9 3.6   CHLORIDE 99 98 98   CO2 24 23 24   BUN 20 32* 22   CR 2.32* 3.32* 2.31*   ANIONGAP 9 9 9   JANENE 8.1* 8.6 8.6   GLC 90 77 92   ALBUMIN 2.3* 2.4* 2.7*   PROTTOTAL 7.0 7.0 6.9   BILITOTAL 5.1* 5.6* 5.4*   ALKPHOS 169* 144 147   ALT 12 10 12   AST 38 36 40     No results found for this or any previous visit (from the past 24 hour(s)).

## 2021-07-25 NOTE — PROVIDER NOTIFICATION
Time of notification: 4:40 AM  Provider notified: Daniela Downs  Patient status: Patient reporting abdominal pain 7/10 not relieved with Dilaudid PO. Patient refusing Tylenol.  Temp:  [97.6  F (36.4  C)-98.7  F (37.1  C)] 98.1  F (36.7  C)  Pulse:  [105-121] 115  Resp:  [12-30] 16  BP: (108-133)/(44-86) 114/58  SpO2:  [91 %-100 %] 97 %  Orders received: Okay to Give next PRN dose early.

## 2021-07-25 NOTE — PROGRESS NOTES
"  Nephrology Progress Note  07/25/2021         Assessment & Recommendations:   Dax Villareal is a 30 year old year old male      Problem list  # RADHA secondary to multifactorial in the setting of severe necrotizing pancreatitis, HRS, sepsis, shock has been on renal replacement therapy for about 2 months. Via right tunneled dialysis.  #Hypervolemic hyponatremia  # Severe necrotizing pancreatitis  # History of septic shock secondary to severe necrotizing pancreatitis  # Decompensated liver cirrhosis  # SBP  # Right pleural effusion; severe  # Mild hyponatremia     No indication for dialysis today. Will plan next dialysis on Tuesday. Again, rt pleural effusion is quite concerning but he does not allow primary team to US. Trying to discuss with the patient today but he asked to rest. His dry weight is not established yet. I assume that it would be around 78.5- 76 kg.     For his anemia, would not give epo or iron at this time due to active infection.     Recommendations were communicated to primary team via note and verbal.     Elizabeth Torres MD     Interval History :   Nursing and provider notes from last 24 hours reviewed.   He is more awake than yesterday but still appears lethargy. He does not engage much with care and wants to sleep. He tolerated dialysis well yesterday without hypotension.     Review of Systems:   10 systems were reviewed and all negative except as mentioned above.     Physical Exam:   I/O last 3 completed shifts:  In: 840 [P.O.:150; Other:10; NG/GT:250; IV Piggyback:10]  Out: 1580 [Drains:80; Other:1500]   /47 (BP Location: Right arm)   Pulse 111   Temp 98.2  F (36.8  C) (Oral)   Resp 20   Ht 1.676 m (5' 6\")   Wt 81.2 kg (179 lb 0.2 oz)   SpO2 94%   BMI 28.89 kg/m       GENERAL APPEARANCE: Somnolent and Jaundice  EYES:  + Jaundice  HENT: Mouth without ulcers or lesions  PULM: Decreased BS right lung.   CV: regular rhythm, normal rate, no rub     -JVD no distended.      -edema only " tracce  GI: soft,  Abdominal distension. + G-tube at upper mid abdomen; surrounding skin appears dry and intact.  INTEGUMENT: No rash  NEURO:  Lethargy, intermittently follows commands.   Access: Rt tunnel dialysis catheter    Labs:   All labs reviewed by me  Electrolytes/Renal -   Recent Labs   Lab Test 07/25/21  0553 07/24/21  0700 07/23/21  0609 07/15/21  0811 07/15/21  0443 07/14/21  0345 07/14/21  0345 07/13/21  0718   * 130* 131*   < > 131*  --  130* 131*   POTASSIUM 3.7 3.9 3.6   < > 3.3*  --  4.2 3.6   CHLORIDE 99 98 98   < > 98  --  98 98   CO2 24 23 24   < > 23  --  20 23   BUN 20 32* 22   < > 26  --  42* 29   CR 2.32* 3.32* 2.31*   < > 2.30*  --  3.35* 2.60*   GLC 90 77 92   < > 131*   < > 137* 94   JANENE 8.1* 8.6 8.6   < > 8.1*  --  8.6 8.5   MAG  --   --   --   --  1.6  --  1.6 1.5*   PHOS 3.7 5.8* 3.9   < > 3.0  --  4.6* 4.3    < > = values in this interval not displayed.       CBC -   Recent Labs   Lab Test 07/25/21  0553 07/24/21  0700 07/23/21  0609   WBC 24.5* 24.3* 21.1*   HGB 8.3* 8.7* 8.3*    240 202       LFTs -   Recent Labs   Lab Test 07/25/21  0553 07/24/21  0700 07/23/21  0609   ALKPHOS 169* 144 147   BILITOTAL 5.1* 5.6* 5.4*   ALT 12 10 12   AST 38 36 40   PROTTOTAL 7.0 7.0 6.9   ALBUMIN 2.3* 2.4* 2.7*       Iron Panel -   Recent Labs   Lab Test 07/19/21  0632   IRON 31*   IRONSAT 43     Imaging:  I reviewed imaging studies.     Current Medications:    amylase-lipase-protease  2 capsule Oral TID w/meals     B and C vitamin Complex with folic acid  5 mL Per Feeding Tube Daily     cefTRIAXone  2 g Intravenous Q24H     folic acid  1 mg Oral Daily    Or     folic acid  1 mg Intravenous Daily     HYDROmorphone (STANDARD CONC)  2 mg Oral Once     lactulose  20 g Oral or NG Tube 4x Daily     linezolid  600 mg Oral Q12H LENKA     melatonin  6 mg Oral At Bedtime     pantoprazole  40 mg Oral QAM AC     [Held by provider] potassium chloride  20 mEq Oral BID     rifaximin  550 mg Oral BID      sodium chloride (PF)  10 mL Irrigation Q8H     vitamin B1  100 mg Oral Daily    Or     thiamine  100 mg Intravenous Daily       - MEDICATION INSTRUCTIONS -       abigail Torres MD

## 2021-07-25 NOTE — PROGRESS NOTES
Despite some risk factors, the patient does not demonstrate definitive evidence of glaucoma at this time. HEMODIALYSIS TREATMENT NOTE    Date: 7/24/2021  Time: 7:15 PM    Data:  Pre Wt: 79.5 kg (171 lb 15.3 oz)   Desired Wt: 78 kg   Post Wt: 78.0 kg (166 lb 7.2 oz)  Weight change: 1.5kg  Ultrafiltration - Post Run Net Total Removed : 1500 mL  Vascular Access Status: CVC  patent  Dialyzer Rinse: Streaked, Light  Total Blood Volume Processed: 66.88 L   Total Dialysis (Treatment) Time: 3.0   Dialysate Bath: K 3, Ca 2.25  Heparin: None    Lab:   Yes : Lactic Acid riana & sent to lab    Assessment / Interventions: 3.0 hours HD via RCVC.  with good flow. Hemodynamic stable throughout the treatment, 1.5kg UF removed without complication & crit-line steady. Pt completed his treatment time, no issues, blood rinsed back, CVC saline locked , handoff report given & pt transfer back to floor in a stable condition.     Plan:    Per Renal Team.

## 2021-07-26 ENCOUNTER — APPOINTMENT (OUTPATIENT)
Dept: PHYSICAL THERAPY | Facility: CLINIC | Age: 31
End: 2021-07-26
Attending: INTERNAL MEDICINE
Payer: MEDICAID

## 2021-07-26 LAB
ALBUMIN SERPL-MCNC: 2.2 G/DL (ref 3.4–5)
ALP SERPL-CCNC: 147 U/L (ref 40–150)
ALT SERPL W P-5'-P-CCNC: 11 U/L (ref 0–70)
ANION GAP SERPL CALCULATED.3IONS-SCNC: 10 MMOL/L (ref 3–14)
AST SERPL W P-5'-P-CCNC: 32 U/L (ref 0–45)
BACTERIA FLD CULT: NO GROWTH
BILIRUB SERPL-MCNC: 5.1 MG/DL (ref 0.2–1.3)
BUN SERPL-MCNC: 28 MG/DL (ref 7–30)
CALCIUM SERPL-MCNC: 8.7 MG/DL (ref 8.5–10.1)
CHLORIDE BLD-SCNC: 100 MMOL/L (ref 94–109)
CO2 SERPL-SCNC: 24 MMOL/L (ref 20–32)
CREAT SERPL-MCNC: 3.16 MG/DL (ref 0.66–1.25)
ERYTHROCYTE [DISTWIDTH] IN BLOOD BY AUTOMATED COUNT: 16.4 % (ref 10–15)
GFR SERPL CREATININE-BSD FRML MDRD: 25 ML/MIN/1.73M2
GLUCOSE BLD-MCNC: 79 MG/DL (ref 70–99)
HCT VFR BLD AUTO: 26.5 % (ref 40–53)
HGB BLD-MCNC: 8.5 G/DL (ref 13.3–17.7)
INR PPP: 1.79 (ref 0.85–1.15)
LACTATE SERPL-SCNC: 3.3 MMOL/L (ref 0.7–2)
LACTATE SERPL-SCNC: 3.4 MMOL/L (ref 0.7–2)
MCH RBC QN AUTO: 30.5 PG (ref 26.5–33)
MCHC RBC AUTO-ENTMCNC: 32.1 G/DL (ref 31.5–36.5)
MCV RBC AUTO: 95 FL (ref 78–100)
PHOSPHATE SERPL-MCNC: 5.2 MG/DL (ref 2.5–4.5)
PLATELET # BLD AUTO: 219 10E3/UL (ref 150–450)
POTASSIUM BLD-SCNC: 3.5 MMOL/L (ref 3.4–5.3)
PROT SERPL-MCNC: 6.9 G/DL (ref 6.8–8.8)
RBC # BLD AUTO: 2.79 10E6/UL (ref 4.4–5.9)
SODIUM SERPL-SCNC: 134 MMOL/L (ref 133–144)
WBC # BLD AUTO: 16.4 10E3/UL (ref 4–11)

## 2021-07-26 PROCEDURE — 99233 SBSQ HOSP IP/OBS HIGH 50: CPT | Mod: GC | Performed by: HOSPITALIST

## 2021-07-26 PROCEDURE — 87040 BLOOD CULTURE FOR BACTERIA: CPT | Performed by: NURSE PRACTITIONER

## 2021-07-26 PROCEDURE — 36592 COLLECT BLOOD FROM PICC: CPT | Performed by: INTERNAL MEDICINE

## 2021-07-26 PROCEDURE — 250N000013 HC RX MED GY IP 250 OP 250 PS 637: Performed by: INTERNAL MEDICINE

## 2021-07-26 PROCEDURE — 85610 PROTHROMBIN TIME: CPT

## 2021-07-26 PROCEDURE — 84100 ASSAY OF PHOSPHORUS: CPT | Performed by: INTERNAL MEDICINE

## 2021-07-26 PROCEDURE — 250N000013 HC RX MED GY IP 250 OP 250 PS 637

## 2021-07-26 PROCEDURE — 80053 COMPREHEN METABOLIC PANEL: CPT | Performed by: INTERNAL MEDICINE

## 2021-07-26 PROCEDURE — 97530 THERAPEUTIC ACTIVITIES: CPT | Mod: GP

## 2021-07-26 PROCEDURE — 250N000011 HC RX IP 250 OP 636: Performed by: HOSPITALIST

## 2021-07-26 PROCEDURE — 99207 PR CDG-CUT & PASTE-POTENTIAL IMPACT ON LEVEL: CPT | Performed by: HOSPITALIST

## 2021-07-26 PROCEDURE — 99233 SBSQ HOSP IP/OBS HIGH 50: CPT | Mod: 24 | Performed by: INTERNAL MEDICINE

## 2021-07-26 PROCEDURE — 83605 ASSAY OF LACTIC ACID: CPT | Performed by: NURSE PRACTITIONER

## 2021-07-26 PROCEDURE — 120N000005 HC R&B MS OVERFLOW UMMC

## 2021-07-26 PROCEDURE — 250N000011 HC RX IP 250 OP 636: Performed by: INTERNAL MEDICINE

## 2021-07-26 PROCEDURE — 83605 ASSAY OF LACTIC ACID: CPT | Performed by: INTERNAL MEDICINE

## 2021-07-26 PROCEDURE — 36592 COLLECT BLOOD FROM PICC: CPT | Performed by: NURSE PRACTITIONER

## 2021-07-26 PROCEDURE — 85027 COMPLETE CBC AUTOMATED: CPT | Performed by: INTERNAL MEDICINE

## 2021-07-26 PROCEDURE — 250N000009 HC RX 250: Performed by: INTERNAL MEDICINE

## 2021-07-26 RX ORDER — LINEZOLID 2 MG/ML
600 INJECTION, SOLUTION INTRAVENOUS EVERY 12 HOURS
Status: DISCONTINUED | OUTPATIENT
Start: 2021-07-26 | End: 2021-07-30

## 2021-07-26 RX ORDER — CEFTRIAXONE 2 G/1
2 INJECTION, POWDER, FOR SOLUTION INTRAMUSCULAR; INTRAVENOUS EVERY 24 HOURS
Status: DISCONTINUED | OUTPATIENT
Start: 2021-07-26 | End: 2021-07-27

## 2021-07-26 RX ADMIN — HYDROXYZINE HYDROCHLORIDE 50 MG: 25 TABLET, FILM COATED ORAL at 20:45

## 2021-07-26 RX ADMIN — ONDANSETRON 4 MG: 2 INJECTION INTRAMUSCULAR; INTRAVENOUS at 17:35

## 2021-07-26 RX ADMIN — HYDROMORPHONE HYDROCHLORIDE 2 MG: 1 SOLUTION ORAL at 21:58

## 2021-07-26 RX ADMIN — LACTULOSE 20 G: 10 SOLUTION ORAL; RECTAL at 15:28

## 2021-07-26 RX ADMIN — PANCRELIPASE 1 CAPSULE: 24000; 76000; 120000 CAPSULE, DELAYED RELEASE PELLETS ORAL at 16:48

## 2021-07-26 RX ADMIN — LINEZOLID 600 MG: 600 INJECTION, SOLUTION INTRAVENOUS at 16:48

## 2021-07-26 RX ADMIN — SODIUM BICARBONATE 325 MG: 325 TABLET ORAL at 13:49

## 2021-07-26 RX ADMIN — HYDROMORPHONE HYDROCHLORIDE 2 MG: 1 SOLUTION ORAL at 18:55

## 2021-07-26 RX ADMIN — HYDROMORPHONE HYDROCHLORIDE 2 MG: 1 SOLUTION ORAL at 03:40

## 2021-07-26 RX ADMIN — HYDROXYZINE HYDROCHLORIDE 50 MG: 25 TABLET, FILM COATED ORAL at 00:06

## 2021-07-26 RX ADMIN — HYDROMORPHONE HYDROCHLORIDE 2 MG: 1 SOLUTION ORAL at 09:14

## 2021-07-26 RX ADMIN — PANCRELIPASE 1 CAPSULE: 24000; 76000; 120000 CAPSULE, DELAYED RELEASE PELLETS ORAL at 13:49

## 2021-07-26 RX ADMIN — BENZONATATE 100 MG: 100 CAPSULE ORAL at 20:45

## 2021-07-26 RX ADMIN — CEFTRIAXONE SODIUM 2 G: 2 INJECTION, POWDER, FOR SOLUTION INTRAMUSCULAR; INTRAVENOUS at 15:27

## 2021-07-26 RX ADMIN — HYDROMORPHONE HYDROCHLORIDE 2 MG: 1 SOLUTION ORAL at 00:05

## 2021-07-26 RX ADMIN — LACTULOSE 20 G: 10 SOLUTION ORAL; RECTAL at 12:13

## 2021-07-26 RX ADMIN — SODIUM BICARBONATE 325 MG: 325 TABLET ORAL at 16:47

## 2021-07-26 RX ADMIN — HYDROMORPHONE HYDROCHLORIDE 2 MG: 1 SOLUTION ORAL at 15:29

## 2021-07-26 RX ADMIN — CALCIUM CARBONATE (ANTACID) CHEW TAB 500 MG 500 MG: 500 CHEW TAB at 04:58

## 2021-07-26 RX ADMIN — MELATONIN TAB 3 MG 6 MG: 3 TAB at 21:59

## 2021-07-26 RX ADMIN — HYDROMORPHONE HYDROCHLORIDE 2 MG: 1 SOLUTION ORAL at 12:13

## 2021-07-26 RX ADMIN — Medication 550 MG: at 20:45

## 2021-07-26 ASSESSMENT — ACTIVITIES OF DAILY LIVING (ADL)
ADLS_ACUITY_SCORE: 20
ADLS_ACUITY_SCORE: 20
ADLS_ACUITY_SCORE: 19
ADLS_ACUITY_SCORE: 20

## 2021-07-26 ASSESSMENT — MIFFLIN-ST. JEOR: SCORE: 1692.75

## 2021-07-26 NOTE — CONSULTS
"    Interventional Radiology Consult Service Note  21     Patient consulted for Right thoracentesis with sedation.      This is a 30 year old male with heavy alcohol use, alcoholic hepatitis, ESLD, RADHA on HD with large symptomatic right effusion seen by CAPS but due to pain and inability to readily position patient now IR consulted to perform thora with sedation.    Case discussed with IR attending Dr. Linda Mandel and Zachary Fitzpatrick NP.    Recommend patient be seated upright for thoracentesis and do not recommend sedation for this procedure.      Spoke with Dr. Cleveland to relay our recommendations.    Addendum 21 2:45 PM  Karen Fitzpatrick NP spoke with Dr. Aly and Dr. Aly reports that patient was in severe pain when trying to roll patient on side for procedure.  RN on floor declined more pain medication as the patient was felt to be too sedated.  Will bring patient down to perform diagnostic and therapeutic thoracentesis. Diagnostic labs have been placed.      Pertinent Imagin2021: CXR:  INDICATION: Increased oxygen need, possible effusion  COMPARISON: 2021     FINDINGS: Increasing extent of right pleural effusion noted. Right  heart border is obscured by the effusion and associated atelectasis.  Right IJ catheter tip is in the distal SVC. Left PICC tip also in the  SVC.                                                          IMPRESSION: Increasing right pleural effusion and associated  atelectasis.    Expected date of discharge: TBD    Vitals:   /60 (BP Location: Right arm)   Pulse 108   Temp 97.8  F (36.6  C) (Oral)   Resp 18   Ht 1.676 m (5' 6\")   Wt 79.5 kg (175 lb 4.3 oz)   SpO2 95%   BMI 28.29 kg/m      Pertinent Labs:     Lab Results   Component Value Date    WBC 16.4 (H) 2021    WBC 24.5 (H) 2021    WBC 24.3 (H) 2021    WBC 25.8 (H) 2021    WBC 23.6 (H) 07/10/2021    WBC 24.7 (H) 2021     Lab Results   Component Value Date    HGB " 8.5 07/26/2021    HGB 8.3 07/25/2021    HGB 8.7 07/24/2021    HGB 7.5 07/11/2021    HGB 7.2 07/10/2021    HGB 6.4 07/10/2021     Lab Results   Component Value Date     07/26/2021     07/25/2021     07/24/2021     07/11/2021     07/10/2021     07/09/2021     Lab Results   Component Value Date    INR 1.79 (H) 07/26/2021    INR 1.91 (H) 07/11/2021    PTT 97 (H) 06/28/2021     Lab Results   Component Value Date    POTASSIUM 3.5 07/26/2021    POTASSIUM 4.6 07/11/2021        COVID-19 Antibody Results, Testing for Immunity    COVID-19 Antibody Results, Testing for Immunity   No data to display.         COVID-19 PCR Results    COVID-19 PCR Results 5/18/21 6/29/21 7/6/21 7/12/21 7/19/21   SARS-CoV-2 Virus Specimen Source Nasopharyngeal Nasopharyngeal Nasopharyngeal     SARS-CoV-2 PCR Result NEGATIVE NEGATIVE NEGATIVE     SARS CoV2 PCR    Negative Negative      Comments are available for some flowsheets but are not being displayed.             María Kearney PA-C  Interventional Radiology  Pager: [phone]

## 2021-07-26 NOTE — PROVIDER NOTIFICATION
Paged ronald 3 provider     [phone]: FYI: Pt has had over 800mL out since 1200. MD acknowledged. Wants to see how pt dose after receiving zofran, page back later if out put is still large.

## 2021-07-26 NOTE — PROGRESS NOTES
Brief caps note    Patient seen and evaluated at bedside. US as performed in the left lateral decubitus position. A large pleural effusion without evidence of loculation was noted in right hemidiaphragm that would be amenable to thoracentesis. Due to pain and inability to readily position patient for the time required for thoracentesis we would recommend that the patient be evaluated by IR for sedation during thoracentesis.     Communicated to team verbally.    Thank you for he consult.      Rush Aly MD on 7/26/2021 at 2:00 PM

## 2021-07-26 NOTE — PLAN OF CARE
[id number]:  Neuro: A&Ox4. Lethargic.   Cardiac: ST 110s. VSS.           Respiratory: Sating 97% on 2L NC, then requested writer to increase O2 to 5L for comfort. A bit SOB, possibly a bit of anxiety, requested to have elevated a bit and O2 increased to 6L. After several minutes requested head back down, but declined to have O2 decreased. Lungs diminished, fine crackles on L.  GI/: Anuric, on HD. No BM during this four hours. L flank drain with 150mL bile/brown OP.  Diet/appetite: Reg diet. Dinner tray brought into room, but patient not ready to eat. TF started at 2:30 on day shift per pt request, then turned off at 1600, again per pt request.  Activity:  Up with lift, encourage ROM in bed.  Pain: At acceptable level on current regimen. Dilaudid x2.  Skin: No new deficits noted. Pulsate mattress, declined to turn during this time period.  LDA's: PICC. CVC for HD. PEG. L flank drain.   Plan: Continue with POC. Notify primary team with changes/concerns.

## 2021-07-26 NOTE — PROGRESS NOTES
Patient declined PICC dressing change (dressing change due 7/26) and would like done tomorrow on 7/27. Bedside nurse aware.

## 2021-07-26 NOTE — PROGRESS NOTES
Nephrology Progress Note  07/26/2021         Dax Villareal is a 30 year old male with h/o ETOH hepatitis, end stage liver disease, RADHA on HD, transferred from [facility] in [place] for septic shock on 6/28/21 for treatment of necrotizing pancreatitis and decompensated liver disease. He was initially admitted to [facility] 5/19/21.        Interval History :   Mr Villareal had GI stenting of pancreas 7/21 in OR, continues to have substantial abd pain which is limiting therapeutic options (unable to do bedside thoracentesis, IR is understandably concerned to sedate him to do).  Planned for HD tomorrow, have been limited in ability to UF by BP's but will try again tomorrow.        Assessment & Recommendations:   RADHA-Cr was 0.8 as recently as 5/19/2021 but now HD dependent, started RRT at [facility] prior to transfer to [facility] (exact date unclear but per family it was end of May).  Etiology likely multifactorial with contrast, sepsis/pancreatitis, likely HRS physiology contributing as well.  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  Continuing RRT as long as it is tolerated with regards to BP's, is functional enough to possibly do outpt HD.  MELD is in high 30's so has high 3 month mortality but continuing restorative cares for now.  Had GI stenting of pancreas on 7/21.                 -Line is TDC from PTA               -Holding on run today, plan for HD tomorrow.      Volume status-Anuric, has abdominal distension and some mild peripheral edema. Pulled 1.5L last run, intake is minimal with ongoing abd pain.       Electrolytes/pH-K 3.5 and Na 134 today, on fluid restriction for hyponatremia.        Ca/phos/pth-Ca 8.7, corrects to normal with low albumin.       Anemia-Hgb 8.5, acute management per team, last PRBC's 7/14.       Nutrition-Taking PO, appetite ok.       Discussed with Dr Dow      Recommendations were communicated to primary team via verbal communication.         SOFIA Marlow CNS  Clinical  "Nurse Specialist  [phone]    Review of Systems:   I reviewed the following systems:  Gen: No fevers or chills  CV: No CP at rest  Resp: No SOB at rest  GI: No N/V    Physical Exam:   I/O last 3 completed shifts:  In: 380 [P.O.:120; I.V.:20; Other:20; NG/GT:150]  Out: 750 [Emesis/NG output:325; Drains:425]   /42 (BP Location: Right arm)   Pulse 118   Temp 97.4  F (36.3  C) (Oral)   Resp 18   Ht 1.676 m (5' 6\")   Wt 79.5 kg (175 lb 4.3 oz)   SpO2 95%   BMI 28.29 kg/m       GENERAL APPEARANCE: Mild distress, + abd pain.    EYES:  scleral icterus, pupils equal  Pulmonary: lungs clear to auscultation. Off supplemental oxygen   CV: tachy   - Edema - no LE edema  GI: large distended, tender. Has retroperitoneal drain  MS: no evidence of inflammation in joints, no muscle tenderness  SKIN: no rash, warm, dry, no cyanosis  NEURO: alert/interactive  ACCESS: Right TDC     Labs:   All labs reviewed by me  Electrolytes/Renal - Recent Labs   Lab Test 07/26/21  0601 07/25/21  0553 07/24/21  0700 07/15/21  0811 07/15/21  0443 07/14/21  0345 07/14/21  0345 07/13/21  0718    132* 130*   < > 131*  --  130* 131*   POTASSIUM 3.5 3.7 3.9   < > 3.3*  --  4.2 3.6   CHLORIDE 100 99 98   < > 98  --  98 98   CO2 24 24 23   < > 23  --  20 23   BUN 28 20 32*   < > 26  --  42* 29   CR 3.16* 2.32* 3.32*   < > 2.30*  --  3.35* 2.60*   GLC 79 90 77   < > 131*   < > 137* 94   JANENE 8.7 8.1* 8.6   < > 8.1*  --  8.6 8.5   MAG  --   --   --   --  1.6  --  1.6 1.5*   PHOS 5.2* 3.7 5.8*   < > 3.0  --  4.6* 4.3    < > = values in this interval not displayed.       CBC -   Recent Labs   Lab Test 07/26/21  0601 07/25/21  0553 07/24/21  0700   WBC 16.4* 24.5* 24.3*   HGB 8.5* 8.3* 8.7*    208 240       LFTs -   Recent Labs   Lab Test 07/26/21  0601 07/25/21  0553 07/24/21  0700   ALKPHOS 147 169* 144   BILITOTAL 5.1* 5.1* 5.6*   ALT 11 12 10   AST 32 38 36   PROTTOTAL 6.9 7.0 7.0   ALBUMIN 2.2* 2.3* 2.4*       Iron Panel - "   Recent Labs   Lab Test 07/19/21  0632   IRON 31*   IRONSAT 43           Current Medications:   amylase-lipase-protease  2 capsule Oral TID w/meals    B and C vitamin Complex with folic acid  5 mL Per Feeding Tube Daily    cefTRIAXone  2 g Intravenous Q24H    folic acid  1 mg Oral Daily    Or    folic acid  1 mg Intravenous Daily    HYDROmorphone (STANDARD CONC)  2 mg Oral Once    lactulose  20 g Oral or NG Tube 4x Daily    linezolid  600 mg Intravenous Q12H    melatonin  6 mg Oral At Bedtime    pantoprazole  40 mg Oral QAM AC    [Held by provider] potassium chloride  20 mEq Oral BID    rifaximin  550 mg Oral BID    sodium chloride (PF)  10 mL Irrigation Q8H    vitamin B1  100 mg Oral Daily    Or    thiamine  100 mg Intravenous Daily      - MEDICATION INSTRUCTIONS -      dextrose       Attestation:  This patient has been seen, examined and evaluated by me, Yu Dow MD as a shared visit with the NP/PA above.    In brief:  Dax Villareal is a 30 year old male with ESLD, RADHA and acute pancreatitis complicated by a large right pleural effusion.  Today he complains of severe abdominal tenderness.  Reluctant to have thoracentesis in spite of difficulty breathing.      Physical exam, vital signs I and O and labs reviewed    Abd is distended and tender to palpation.  2 plus edema.  Absent breath sounds on right  Bili 5.1 alb 2     Assessment:  # ESLD: not a transplant candidate  # RADHA: Do not expect renal recovery  # pancreatitis: continued abd pain and not much drain output  # pleural effusion: pt is considering tap    Plan:   HD tomorrow  Hopefully thoracentesis today.    Yu Dow MD     [phone]

## 2021-07-26 NOTE — PROGRESS NOTES
[facility]    Progress Note - [place] 3 Service        Date of Admission:  6/28/2021    Assessment & Plan   Dax Villareal is a 30 year old male with hx of alcohol use disorder admitted on 6/28/2021 after recent prolonged admission at [facility] in [place] who has severe acute alcoholic hepatitis c/b HE, ESRD requiring HD, and malnutrition in the setting of acute necrotizing pancreatitis, s/p PEG-J placement on 7/13. He arrived with acute respiratory failure and septic shock that have resolved, and his SBP has been appropriately treated. He requires continued management for ESRD, hepatic encephalopathy, infected pancreatic pseudocyst, nutritional support via PEG-J, and secondary bacterial peritonitis.     Today:  - Will continue Ceftriaxone and Linezolid until the endoscopic debridement later this week, will plan to discontinue after that   - IR consult for thoracentesis (failed bedside attempt)      # Necrotizing pancreatitis w/ infected pseudocyst s/p RP Drain and Cystogastrostomy, improving  # Septic Shock, resolved   # Bacterial Peritonitis, secondary to infected pseudocyst    # Leukocytosis, stable  # Peripancreatic fluid collection growing VRE   Etiology of the septic shock that he came in with likely due to necrotizing pancreatitis, infected pseudocyst, and peritonitis. Had percutaneous RP drain placed for drainage of pancreatic collection. Diagnostic para returned with peritonitis.     Per discussion with ID, ongoing leukocytosis is not unexpected with nec panc. CT 7/18 showed unchanged moderate ascites and collection size. Cystgastrostomy on 7/21 with therapeutic para.     - Continue Ceftriaxone + Linezolid through endoscopic debridement this week   - Daily CBC  - MAP goal > 65  - GI following, appreciate recs               -Pancreatic enzymes    - For back pain: Tylenol PRN and Dilaudid 2mg q3h PRN     # Dyspnea iso pleural effusion v pulm edema  7/19 evening,  patient developed increasing cough with thick, clear secretions. CXR w/ increased RLL haziness; ddx includes pleural effusion v pneumonia v atelectasis. Most likely effusion as patient is likely fluid up due to declining dialysis and therapeutic para; unlikely pneumonia as sputum is non-purulent and patient is afebrile. Sputum clx grew Enterococcus faecium, thought to be contaminant.      7/22, repeat CXR showed increased diffuse haziness over the entire right lung. Likely hepatic hydrothorax; patient recently developed secondary bacterial peritonitis and it is likely that the infected fluid is also accumulating in the lungs.Ongoing increased WOB with use of accessory muscles, diminished lung sounds. Repeat CXR with increasing effusion. Has been refusing thoracentesis last several days.      - IR consult for thoracentesis    - if thora obtained will send cell count, gram stain, cultures      # Severe malnutrition in setting of chronic illness  PEG-J placed 7/13. Procedure went well, patient and nutrition have discussed plan to run TFs during the day only from 12-6 pm at 70 mL/hr and work on PO intake, he intermittently refuses these however.      - Dietitian consult for new tube feeds, appreciate recs              - Continue TFs at 70 mL/hr, 12-6 pm (though intermittently refusing)                - Cont panc enzymes  - Regular Diet, thin liquids per SLP  - Continue multivitamins  - Pantoprazole 40 mg IV daily  - Zofran PRN     # Alcoholic hepatitis complicated by portal hypertension, ascites, and HE - improving   # Hx of Alcohol use disorder c/b withdrawal   # Anemia  # Coagulopathy   Patient with a history of marked alcohol use and alcoholic hepatitis in the past. Was given a course of steroids (1 week) during previous admission at OSH. Patient mental status has been stable, alert and oriented x4 for a couple weeks. Patient has consistently had 1-2 pRBC infusions weekly d/t Hgb drops of 0.3-0.5 every day (etilogy  likely chronic liver disease, chronic kidney disease, phlebotomy, dilution, poor nutrition, ect). Hgb stable at 8.3.      - Lactulose 20g QID and PRN and rifaximin 550mg BID - as patient allows  - Trend MELD labs  - Daily folic acid and thiamine  - Delirium precautions  - Melatonin 6mg at bedtime  - Daily CBC   - Transfuse if hemoglobin < 7, platelet < 10 or < 30 with bleeding  - Continue to monitor for signs of bleeding     MELD-Na score: 31 at 7/26/2021  6:01 AM  MELD score: 30 at 7/26/2021  6:01 AM  Calculated from:  Serum Creatinine: 3.16 mg/dL at 7/26/2021  6:01 AM  Serum Sodium: 134 mmol/L at 7/26/2021  6:01 AM  Total Bilirubin: 5.1 mg/dL at 7/26/2021  6:01 AM  INR(ratio): 1.79 at 7/26/2021  6:01 AM  Age: 30 years     # ESRD on HD (MWF)  # Hypervolemia causing acute respiratory failure, resolved  Nephrology note 7/21 indicates that patient has been on renal replacement therapy for two months and pt status has most likely transitioned from ARF  to ESRD at this point.          > Hemodialysis as per renal (MWF)              - Midodrine 10 mg PRN before dialysis sessions     > Daily chemistry labs, strict I/O, and daily weights  - Aspiration precautions  - Supplemental oxygen as needed to keep sats above 89%     # Goals of Care  Please see additional interval note for in depth details regarding conversation on 7/9 about goals of care. Patient's long term prognosis is still quite poor given his multi organ failure. Care conference on 7/15, pt did not wish to participate. Met with sisters Noni (on phone) and Melyssa and close friend Leroy with palliative, nephrology and general medicine teams today to discuss Dax's current improving status, poor prognosis and wishes to continue full cares. Plan to meet Fridays at 4 pm with teams and family for weekly updates.     - Hepatology confirmed pt will not be a transplant candidate       Diet: Snacks/Supplements Adult: Ensure Clear; With Meals  Fluid restriction 1200 ML  FLUID  Adult Formula Drip Feeding: Specified Time Vital 1.5; Jejunostomy; Goal Rate: 70; mL/hr; From: 12:00 PM; 6:00 PM; Medication - Feeding Tube Flush Frequency: At least 15-30 mL water before and after medication administration and with tube clogging;...  Regular Diet Adult    DVT Prophylaxis: Pneumatic Compression Devices  Rdz Catheter: Not present  Fluids: 10 mL NS TKO  Central Lines: PRESENT  PICC Triple Lumen 06/28/21 Left-Site Assessment: WDL  CVC Double Lumen Right-Site Assessment: WDL  Code Status: Full Code      Disposition Plan   Expected discharge: 07/30/2021 recommended to transitional care unit once nutrition improved, infected pancreatic pseudocyst is well managed and plan for dialysis is in place.     The patient's care was discussed with the Attending Physician, Dr. South.    Jaime Amin, MS3  Medical Student  [address]  Securely message with the Vocera Web Console (learn more here)  Text page via [employer] Paging/Directory  Please see sign in/sign out for up to date coverage information  ____________________________________________________________________    Interval History   No acute events overnight. Nursing notes reviewed.     This morning is more uncomfortable than yesterday. Reports shortness of breath worse today than yesterday. Ongoing abdominal, unchanged, asking to sleep.     Data reviewed today: I reviewed all medications, new labs and imaging results over the last 24 hours. I personally reviewed the chest x-ray image(s) showing worsening pleural effusion and associated atelectasis.    Physical Exam   Vital Signs: Temp: 97.8  F (36.6  C) Temp src: Oral BP: 104/48 Pulse: 107   Resp: 16 SpO2: 99 % O2 Device: Nasal cannula Oxygen Delivery: 6 LPM  Weight: 175 lbs 4.25 oz    Gen: Lying down in bed, appears similar to yesterday. Unable to communicate very well d/t severe  fatigue  HEENT: Conjunctival icterus  CV: Tachycardic. Regular rhythm.   Resp: Deep breathing. Speaks in 2-3 word sentences. No wheezing or coughing. Upper lobes CTA. Diminished breath sounds RLL.  Abd: Refused palpation today, PEG J in place without surrounding erythema   Skin: Jaundiced.  Ext: Well perfused, no edema  Neuro: Oriented x3, responding appropriately to questions, tremulous    Data   Recent Labs   Lab 07/26/21  0601 07/25/21  0553 07/24/21  0700   WBC 16.4* 24.5* 24.3*   HGB 8.5* 8.3* 8.7*   MCV 95 94 94    208 240   INR 1.79* 1.85* 1.86*    132* 130*   POTASSIUM 3.5 3.7 3.9   CHLORIDE 100 99 98   CO2 24 24 23   BUN 28 20 32*   CR 3.16* 2.32* 3.32*   ANIONGAP 10 9 9   JANENE 8.7 8.1* 8.6   GLC 79 90 77   ALBUMIN 2.2* 2.3* 2.4*   PROTTOTAL 6.9 7.0 7.0   BILITOTAL 5.1* 5.1* 5.6*   ALKPHOS 147 169* 144   ALT 11 12 10   AST 32 38 36     No results found for this or any previous visit (from the past 24 hour(s)).

## 2021-07-26 NOTE — PROVIDER NOTIFICATION
Paged Clarissa Cleveland MD at [phone]: requesting an order for weekly COVID swab. MD placed orders for covid swab.     1551: YUKO servin. Pt refused weekly COVID swab stating he will do it tomorrow. Also refusing to go to IR for thoracentesis today, again stating he will do it tomorrow.

## 2021-07-26 NOTE — PLAN OF CARE
Plan of Care [phone]  Neuro:    - Patient Refused Neuro Assessment.   - Patient took 1 Scheduled Dose of Lactulose.    Cardiac:    - Rhythm: SR/ST   - VSS.     Respiratory:    - SPO2 > 90% on 5L/6L.    :   - No Urine this Shift. On HD.    GI:   - Small BM this AM.    Diet/appetite:    - Reg Diet - No Food Intake.   - Sips of Water.   - Tube Feed during Day.    Activity:     - Up with Lift.   - Bed Alarm Active.    Pain:    - At acceptable level on current regimen.     Skin:    - Skin Tear on Buttocks, [place].   - Refusing repositioning.    LDA's:   - 3 Lumen PICC   - J/G Tube - G to Gravity.   - Abdominal Drain to Gravity    Plan:    - Continue with POC.    - Notify primary team with changes.

## 2021-07-26 NOTE — PROGRESS NOTES
GASTROENTEROLOGY PROGRESS NOTE      Date of Admission: 6/28/2021  Reason for Admission: pancreatitis/hepatitis    ASSESSMENT/RECOMMENDATIONS:  30 year old male with a history of heavy alcohol use who presented to OSH 5/19 for abdominal pain, nausea and vomiting, admitted for alcohol hepatitis, ESLD, RADHA on HD, septic shock requiring pressors as well as necrotizing pancreatitis with large evolving necrotic collection. Transferred to [facility] for consideration of drainage of presumed infected necrotic collection. S/p EUS-guided necrosectomy (7/21/21).     #. Acute necrotizing pancreatitis with presumed infected walled off necrotic collection s/p percutaneous and endoscopic drainage   #. Septic shock, resolved  #. Leukocytosis  #. Ascites/Peritonitis  Unclear evolution of pancreatitis history but had a recent CT scan with large evolving necrotic collection, presumed infected. No obvious gas in collection. CT on admission to [facility] showing mature collection with enhancing wall measuring [id number]. With profound coagulopathy (liver disease + malnutrition +sepsis), INR 3.8 on transfer he was given FFP and Vit K, IR placed perc drain 6/29. Repeat imaging 7/18 with still large walled off necrotic collection that does appear amenable to endoscopic transluminal drainage, underwent cystgastrostomy stenting with necrosectomy on 7/21/21 (see procedure notes for details), large amount of solid debris identified. Would recommend ongoing discussions with the patient regarding goals of care.  Etiology: alcohol  Date of onset: 5/19  BISAP score on admission: unclear  Concurrent organ failure: respiratory (now extubated), renal (on HD), liver, cardiogenic (weaned off pressors 6/29)  Thromboses: none  Diabetes: no  Current nutrition access: PEG-J + po diet  Last imaging: CT scan with IV contrast 7/18  Infection/antiinfectives: (Enterococcus faecium VRE from L RP flank drain 7/14)                - Meropenem (6/29 - 7/18)     -  "Ciprofloxacin (7/19 - 7/21)                - Vancomycin (6/29 - 6/30)     - Micafungin (6/29 - 7/1)     - Ceftriaxone (7/21 - 7/23)      - Linezolid (7/21 - present)   Intervention:    6/30 - Left IR RP perc drain (24F)    7/2 - Diagnostic paracentesis (WBC 2663 - 74% PMN, SAAG <1.1, protein 4.1)     7/5 - Repeat Paracentesis (WBC 1801 - 61% PMN, lipase 48)    7/13 - Diag/therapeutic paracentesis ()    7/13 - PEG-J placement with T-tag gastropexy    7/21 - Repeat paracentesis    7/21 - EUS-cystgastrostomy and necrosectomy, Axios with co-axial Viabil    - Repeat CT abd/pelv with IV contrast today/tomorrow  - Repeat endoscopic necrosectomy later this week  - drain management per IR (flushes etc)  - PRN paracentesis  - Please continue antibiotics for now until debridements are complete -- would not anticipate a need for prophylactic antibiotics after completion of current course  - pain meds per primary team    #. Severe malnutrition in the context of chronic illness  #. S/p PEG-J placement  #. Mild to moderate EPI  Patient with very poor oral intake over the course of his illness and was not meeting calorie requirements orally. S/p PEG-J with T-tag gastropexy (x3) 7/13.  - Tube feeds and oral diet as tolerated  - Continue PERT (defer to dietitian for dosing)  - oral diet as tolerated (can ADAT after his necrosectomy)  - T-tags (white buttons) should be removed 2 weeks following procedure (~7/27) by lifting the button and cutting the underlying blue suture. Often times the buttons fall off prior to the 2 week louis.     #. Alcohol hepatitis  #. Hyperbilirubinemia  #. Coagulopathy  #. Encephalopathy  Known heavy alcohol use. Reportedly received course of steroids at OSH for 1 week with \"some improvement\". Currently has primarily hyperbilirubinemia and mildly elevated AST. Unclear if he has underlying cirrhosis but certainly would fit with alcohol hepatitis vs elevated liver tests of critical illness/sepsis. Not " "candidate for steroids right now. He is receiving lactulose and Xifaxin for encephalopathy but having a lot of stool output. Hepatology notified of admission and they have weighed in on transplant status 7/22 (Makayla Quan APRN CNP)  - trend MELD labs  - continue lactulose/Xifaxin and titrate to 3 soft stools per day -- please record stool output  - alcohol abstinence  - hepatology team following peripherally    This patient has a very poor overall prognosis and agree with palliative care consultation and GOC discussions with the family    The patient was seen and discussed and plan agreed upon with GI staff, Dr. Ramos Norwood PA-C  Advanced Endoscopy/Pancreaticobiliary GI Service  [facility]  Text Page  ____________________________________________      Subjective: Nursing notes and 24hr events reviewed. No new complaints. Abd pain continues to be improved since drainage procedure.    ROS:   4 pt ROS negative unless noted in subjective.     Objective:  Blood pressure 104/48, pulse 107, temperature 97.8  F (36.6  C), temperature source Oral, resp. rate 16, height 1.676 m (5' 6\"), weight 79.5 kg (175 lb 4.3 oz), SpO2 99 %.  Gen: Laying in bed. Appears comfortable, fatigued.   HEENT: NCAT. Conjunctiva clear. Sclera ++icteric, poor dentition  CV: Peripheral perfusion intact  Resp: non labored breathing  Abd: moderate distention, minimal dark brown output in RP perc drain, GJ tube with dried blood but as previous, no peritonitic signs  Skin:  ++ jaundice  Ext: warm, well perfused  Mental status/Psych: alert and oriented, flat affect, appears fatigued    LABS:  BMP  Recent Labs   Lab 07/26/21  0601 07/25/21  0553 07/24/21  0700 07/23/21  0609    132* 130* 131*   POTASSIUM 3.5 3.7 3.9 3.6   CHLORIDE 100 99 98 98   JANENE 8.7 8.1* 8.6 8.6   CO2 24 24 23 24   BUN 28 20 32* 22   CR 3.16* 2.32* 3.32* 2.31*   GLC 79 90 77 92     CBC  Recent Labs   Lab 07/26/21  0601 07/25/21  0553 " 07/24/21  0700 07/23/21  0609   WBC 16.4* 24.5* 24.3* 21.1*   RBC 2.79* 2.71* 2.80* 2.68*   HGB 8.5* 8.3* 8.7* 8.3*   HCT 26.5* 25.5* 26.3* 25.4*   MCV 95 94 94 95   MCH 30.5 30.6 31.1 31.0   MCHC 32.1 32.5 33.1 32.7   RDW 16.4* 16.7* 16.7* 17.2*    208 240 202     INR  Recent Labs   Lab 07/26/21  0601 07/25/21  0553 07/24/21  0700 07/23/21  0609   INR 1.79* 1.85* 1.86* 1.80*     LFTs  Recent Labs   Lab 07/26/21  0601 07/25/21  0553 07/24/21  0700 07/23/21  0609   ALKPHOS 147 169* 144 147   AST 32 38 36 40   ALT 11 12 10 12   BILITOTAL 5.1* 5.1* 5.6* 5.4*   PROTTOTAL 6.9 7.0 7.0 6.9   ALBUMIN 2.2* 2.3* 2.4* 2.7*      PANC  No lab results found in last 7 days.      IMAGING:  EXAMINATION: CT ABDOMEN PELVIS W CONTRAST, 7/18/2021 11:29 AM     TECHNIQUE:  Helical CT images from the lung bases through the  symphysis pubis were obtained with IV contrast. Contrast: 119 cc  Isovue-[id number]      COMPARISON: 7/11/2021, 6/30/2021     HISTORY: Abdominal abscess/infection suspected     FINDINGS:     LUNG BASES: Normal heart size, trace pericardial effusion. Moderate  right pleural effusion with associated compressive atelectasis. Tiny  left basilar pleural effusion with associated compressive atelectasis.     ABDOMEN/PELVIS: Percutaneous gastrojejunostomy with tip in the  proximal jejunum. Unremarkable hepatic parenchyma. Ascites along the  inferior right hepatic lobe, extending into the pelvis and the left  paracolic gutter. Mild mural hyperemia of the gallbladder with mild  associated gallbladder thickening. Sequela of necrotizing pancreatitis  with associated 15 x 6 cm peripancreatic walled off necrosis with  drain in place. Overall, there is improved mesenteric stranding  surrounding the necrotic collection. There is viable enhancement of  the uncinate process and pancreatic head as well as distal body and  tail. Spleen measures 16 cm. There is extensive reactive appearing  lymphadenopathy throughout the mesentery and  retroperitoneum. There is  poor contrast uptake throughout the renal parenchyma. Visualized  ureters are normal. Normal caliber, edematous loops of small bowel.  Predominantly fluid-filled colon. Appendix is well visualized. Major  abdominal vasculature is patent. No concerning pelvic mass.     BONES: No acute or suspicious osseous abnormality.                                                                      IMPRESSION:  1. Continued sequelae of necrotizing pancreatitis with the 15 x 6 cm  peripancreatic walled off necrosis predominantly involving the lesser  sac with drain in place. Enhancement of the residual viable pancreatic  tissue in the uncinate, body, head, and the majority of the tail.  2. Poor parenchymal enhancement of the kidneys suggesting ATN.  3. Moderate amount of ascites with diffuse peritoneal enhancement and  dependant debris in the pelvis. Peritonitis cannot be ruled out.  4. Right pleural effusion with associated compressive atelectasis.  Trace left pleural effusion.

## 2021-07-27 ENCOUNTER — APPOINTMENT (OUTPATIENT)
Dept: CT IMAGING | Facility: CLINIC | Age: 31
End: 2021-07-27
Attending: PHYSICIAN ASSISTANT
Payer: MEDICAID

## 2021-07-27 LAB
ALBUMIN SERPL-MCNC: 1.9 G/DL (ref 3.4–5)
ALP SERPL-CCNC: 138 U/L (ref 40–150)
ALT SERPL W P-5'-P-CCNC: 8 U/L (ref 0–70)
ANION GAP SERPL CALCULATED.3IONS-SCNC: 12 MMOL/L (ref 3–14)
AST SERPL W P-5'-P-CCNC: 27 U/L (ref 0–45)
BILIRUB SERPL-MCNC: 4.6 MG/DL (ref 0.2–1.3)
BUN SERPL-MCNC: 39 MG/DL (ref 7–30)
CALCIUM SERPL-MCNC: 8.9 MG/DL (ref 8.5–10.1)
CHLORIDE BLD-SCNC: 98 MMOL/L (ref 94–109)
CO2 SERPL-SCNC: 23 MMOL/L (ref 20–32)
CREAT SERPL-MCNC: 3.96 MG/DL (ref 0.66–1.25)
ERYTHROCYTE [DISTWIDTH] IN BLOOD BY AUTOMATED COUNT: 16.5 % (ref 10–15)
GFR SERPL CREATININE-BSD FRML MDRD: 19 ML/MIN/1.73M2
GLUCOSE BLD-MCNC: 114 MG/DL (ref 70–99)
HCT VFR BLD AUTO: 25.7 % (ref 40–53)
HGB BLD-MCNC: 8.4 G/DL (ref 13.3–17.7)
INR PPP: 1.78 (ref 0.85–1.15)
LACTATE SERPL-SCNC: 3.9 MMOL/L (ref 0.7–2)
MCH RBC QN AUTO: 30.9 PG (ref 26.5–33)
MCHC RBC AUTO-ENTMCNC: 32.7 G/DL (ref 31.5–36.5)
MCV RBC AUTO: 95 FL (ref 78–100)
PLATELET # BLD AUTO: 188 10E3/UL (ref 150–450)
POTASSIUM BLD-SCNC: 3.5 MMOL/L (ref 3.4–5.3)
PROT SERPL-MCNC: 7.1 G/DL (ref 6.8–8.8)
RBC # BLD AUTO: 2.72 10E6/UL (ref 4.4–5.9)
SODIUM SERPL-SCNC: 133 MMOL/L (ref 133–144)
WBC # BLD AUTO: 14.5 10E3/UL (ref 4–11)

## 2021-07-27 PROCEDURE — 74176 CT ABD & PELVIS W/O CONTRAST: CPT

## 2021-07-27 PROCEDURE — 85610 PROTHROMBIN TIME: CPT

## 2021-07-27 PROCEDURE — 250N000009 HC RX 250: Performed by: INTERNAL MEDICINE

## 2021-07-27 PROCEDURE — 90937 HEMODIALYSIS REPEATED EVAL: CPT

## 2021-07-27 PROCEDURE — 250N000013 HC RX MED GY IP 250 OP 250 PS 637: Performed by: INTERNAL MEDICINE

## 2021-07-27 PROCEDURE — 99233 SBSQ HOSP IP/OBS HIGH 50: CPT | Mod: 24 | Performed by: INTERNAL MEDICINE

## 2021-07-27 PROCEDURE — 36415 COLL VENOUS BLD VENIPUNCTURE: CPT | Performed by: PHYSICIAN ASSISTANT

## 2021-07-27 PROCEDURE — 258N000003 HC RX IP 258 OP 636: Performed by: STUDENT IN AN ORGANIZED HEALTH CARE EDUCATION/TRAINING PROGRAM

## 2021-07-27 PROCEDURE — 80053 COMPREHEN METABOLIC PANEL: CPT | Performed by: INTERNAL MEDICINE

## 2021-07-27 PROCEDURE — 999N000202 HC STATISTICAL VASC ACCESS NURSE TIME, 1-15 MINUTES

## 2021-07-27 PROCEDURE — 250N000011 HC RX IP 250 OP 636: Performed by: PHYSICIAN ASSISTANT

## 2021-07-27 PROCEDURE — 36592 COLLECT BLOOD FROM PICC: CPT

## 2021-07-27 PROCEDURE — 258N000003 HC RX IP 258 OP 636: Performed by: CLINICAL NURSE SPECIALIST

## 2021-07-27 PROCEDURE — 99233 SBSQ HOSP IP/OBS HIGH 50: CPT | Mod: GC | Performed by: STUDENT IN AN ORGANIZED HEALTH CARE EDUCATION/TRAINING PROGRAM

## 2021-07-27 PROCEDURE — 120N000005 HC R&B MS OVERFLOW UMMC

## 2021-07-27 PROCEDURE — 99233 SBSQ HOSP IP/OBS HIGH 50: CPT | Performed by: PHYSICIAN ASSISTANT

## 2021-07-27 PROCEDURE — 250N000013 HC RX MED GY IP 250 OP 250 PS 637: Performed by: STUDENT IN AN ORGANIZED HEALTH CARE EDUCATION/TRAINING PROGRAM

## 2021-07-27 PROCEDURE — 74176 CT ABD & PELVIS W/O CONTRAST: CPT | Mod: 26 | Performed by: RADIOLOGY

## 2021-07-27 PROCEDURE — 250N000011 HC RX IP 250 OP 636: Performed by: INTERNAL MEDICINE

## 2021-07-27 PROCEDURE — 258N000003 HC RX IP 258 OP 636: Performed by: PHYSICIAN ASSISTANT

## 2021-07-27 PROCEDURE — 250N000011 HC RX IP 250 OP 636: Performed by: HOSPITALIST

## 2021-07-27 PROCEDURE — 36592 COLLECT BLOOD FROM PICC: CPT | Performed by: STUDENT IN AN ORGANIZED HEALTH CARE EDUCATION/TRAINING PROGRAM

## 2021-07-27 PROCEDURE — 99207 PR CDG-CUT & PASTE-POTENTIAL IMPACT ON LEVEL: CPT | Performed by: STUDENT IN AN ORGANIZED HEALTH CARE EDUCATION/TRAINING PROGRAM

## 2021-07-27 PROCEDURE — 83605 ASSAY OF LACTIC ACID: CPT | Performed by: STUDENT IN AN ORGANIZED HEALTH CARE EDUCATION/TRAINING PROGRAM

## 2021-07-27 PROCEDURE — 87040 BLOOD CULTURE FOR BACTERIA: CPT | Performed by: PHYSICIAN ASSISTANT

## 2021-07-27 PROCEDURE — 36592 COLLECT BLOOD FROM PICC: CPT | Performed by: PHYSICIAN ASSISTANT

## 2021-07-27 PROCEDURE — 85027 COMPLETE CBC AUTOMATED: CPT | Performed by: INTERNAL MEDICINE

## 2021-07-27 RX ORDER — PIPERACILLIN SODIUM, TAZOBACTAM SODIUM 2; .25 G/10ML; G/10ML
2.25 INJECTION, POWDER, LYOPHILIZED, FOR SOLUTION INTRAVENOUS EVERY 6 HOURS
Status: DISCONTINUED | OUTPATIENT
Start: 2021-07-27 | End: 2021-08-06

## 2021-07-27 RX ADMIN — HYDROXYZINE HYDROCHLORIDE 50 MG: 25 TABLET, FILM COATED ORAL at 11:07

## 2021-07-27 RX ADMIN — ONDANSETRON 4 MG: 2 INJECTION INTRAMUSCULAR; INTRAVENOUS at 16:31

## 2021-07-27 RX ADMIN — CEFTRIAXONE SODIUM 2 G: 2 INJECTION, POWDER, FOR SOLUTION INTRAMUSCULAR; INTRAVENOUS at 15:00

## 2021-07-27 RX ADMIN — FOLIC ACID 1 MG: 1 TABLET ORAL at 08:17

## 2021-07-27 RX ADMIN — MIDODRINE HYDROCHLORIDE 10 MG: 5 TABLET ORAL at 11:07

## 2021-07-27 RX ADMIN — Medication 550 MG: at 19:49

## 2021-07-27 RX ADMIN — PROCHLORPERAZINE EDISYLATE 5 MG: 5 INJECTION INTRAMUSCULAR; INTRAVENOUS at 19:48

## 2021-07-27 RX ADMIN — THIAMINE HCL TAB 100 MG 100 MG: 100 TAB at 08:17

## 2021-07-27 RX ADMIN — PANCRELIPASE 1 CAPSULE: 24000; 76000; 120000 CAPSULE, DELAYED RELEASE PELLETS ORAL at 11:17

## 2021-07-27 RX ADMIN — SODIUM CHLORIDE 300 ML: 9 INJECTION, SOLUTION INTRAVENOUS at 12:21

## 2021-07-27 RX ADMIN — Medication 550 MG: at 08:18

## 2021-07-27 RX ADMIN — Medication 5 ML: at 08:18

## 2021-07-27 RX ADMIN — PIPERACILLIN SODIUM AND TAZOBACTAM SODIUM 2.25 G: 2; .25 INJECTION, POWDER, LYOPHILIZED, FOR SOLUTION INTRAVENOUS at 22:48

## 2021-07-27 RX ADMIN — HYDROMORPHONE HYDROCHLORIDE 2 MG: 1 SOLUTION ORAL at 01:06

## 2021-07-27 RX ADMIN — LINEZOLID 600 MG: 600 INJECTION, SOLUTION INTRAVENOUS at 15:51

## 2021-07-27 RX ADMIN — HYDROMORPHONE HYDROCHLORIDE 1 MG: 1 SOLUTION ORAL at 19:49

## 2021-07-27 RX ADMIN — HYDROMORPHONE HYDROCHLORIDE 2 MG: 1 SOLUTION ORAL at 08:19

## 2021-07-27 RX ADMIN — Medication 40 MG: at 08:17

## 2021-07-27 RX ADMIN — SODIUM CHLORIDE 250 ML: 9 INJECTION, SOLUTION INTRAVENOUS at 22:25

## 2021-07-27 RX ADMIN — SODIUM CHLORIDE 250 ML: 9 INJECTION, SOLUTION INTRAVENOUS at 12:21

## 2021-07-27 RX ADMIN — LACTULOSE 20 G: 10 SOLUTION ORAL; RECTAL at 08:19

## 2021-07-27 RX ADMIN — LACTULOSE 20 G: 10 SOLUTION ORAL; RECTAL at 19:51

## 2021-07-27 RX ADMIN — LACTULOSE 20 G: 10 SOLUTION ORAL; RECTAL at 11:09

## 2021-07-27 RX ADMIN — SODIUM BICARBONATE 325 MG: 325 TABLET ORAL at 11:17

## 2021-07-27 RX ADMIN — HYDROMORPHONE HYDROCHLORIDE 2 MG: 1 SOLUTION ORAL at 04:14

## 2021-07-27 RX ADMIN — LINEZOLID 600 MG: 600 INJECTION, SOLUTION INTRAVENOUS at 04:14

## 2021-07-27 RX ADMIN — SODIUM CHLORIDE, POTASSIUM CHLORIDE, SODIUM LACTATE AND CALCIUM CHLORIDE 250 ML: 600; 310; 30; 20 INJECTION, SOLUTION INTRAVENOUS at 22:26

## 2021-07-27 ASSESSMENT — ACTIVITIES OF DAILY LIVING (ADL)
ADLS_ACUITY_SCORE: 20

## 2021-07-27 ASSESSMENT — MIFFLIN-ST. JEOR: SCORE: 1657.75

## 2021-07-27 NOTE — PROGRESS NOTES
HEMODIALYSIS TREATMENT NOTE    Date: 7/27/2021  Time: 3:04 PM    Data:  Pre Wt: 78 kg (171 lb 15.3 oz)   Desired Wt: 74 kg   Post Wt: 75.5 kg (166 lb 7.2 oz)  Weight change: 2.5 kg  Ultrafiltration - Post Run Net Total Removed (mL): 1010 mL  Vascular Access Status: CVC  patent  Dialyzer Rinse: Clotted  Total Blood Volume Processed: 40.46 L   Total Dialysis (Treatment) Time: 2.25   Dialysate Bath: K 3, Ca 2.25  Heparin: None    Lab:   No    Assessment/Interventions:  Patient arrived to dialysis for 3 hours HD via CVC, patient on K 3, Ca 2.25 bath.Patient on 4LPM during run and also TF running 70 mL/h.  Patient disoriented at times. Patient had large watery stool X 2 during dialysis. Patient venous line clotted with a little over 1 hour remaining and able to pull 1 Liter fluid. Per Prescriber patient possible HD Wednesday. Hand off report given to LEAH Vega.       Plan:    Per Renal Team.

## 2021-07-27 NOTE — PLAN OF CARE
"/56   Pulse 113   Temp 97.6  F (36.4  C) (Oral)   Resp 16   Ht 1.676 m (5' 6\")   Wt 76 kg (167 lb 8.8 oz)   SpO2 96%   BMI 27.04 kg/m      Shift: 6403-2541  Reason for Admission: Septic shock, necrotizing pancreatitis and decompensated liver disease  VS: VSS on 2L NC. ST  Neuros: Disoriented to time. Intermittently lethargic. WH 0-1. 4/5 motor strength. Unable to track fingers with eyes  GI/: 2 BMs this shift, anuric d/t HD  Nutrition: Regular diet, no appetite. 1.2L FR. TF from 12p-6p @ 70 mL/hr (goal)  Drains/Lines: L. PICC 3L- TKO/SL, PEGT, G to gravity, J for cycled TF  Activity: A2 + lift. Repo q2h  Pain/Nausea: C/o of abdominal pain- PRN Dilaudid given. Denies nausea  Respiratory: Sats >94% on 2L NC  Skin: Mepilex on buttocks. Peeling/dry skin throughout  New this shift: Refusing cares, refused OT, refused CT scan, refused COVID swab. Will attempt to convince later in day. Pt had HD today, pulled 1L  Plan of care: Plan for HD tomorrow. Still need CT, COVID swab, and Thoracentesis. Will continue to monitor and follow POC.  "

## 2021-07-27 NOTE — PROGRESS NOTES
"/55 (BP Location: Right arm)   Pulse 116   Temp 97.6  F (36.4  C) (Oral)   Resp 20   Ht 1.676 m (5' 6\")   Wt 76 kg (167 lb 8.8 oz)   SpO2 94%   BMI 27.04 kg/m    Neuro: A&Ox4. Lethargic most of the shift.  Cardiac: Afebrile, VSS.   Respiratory:2L NC  GI/:on HD. BM this shift.   Diet/appetite: Tolerating diet.Nausea and vomiting, received  zofran.  Activity: Up lift, turn and repo.Pt refuses.    Pain:Denies   Skin:No new deficits noted.  Lines:CVC, PICC  Drains: PEG, abd open drain  No new complaints.Will continue to monitor and follow plan of care.       "

## 2021-07-27 NOTE — PROGRESS NOTES
GASTROENTEROLOGY PROGRESS NOTE      Date of Admission: 6/28/2021  Reason for Admission: pancreatitis/hepatitis    ASSESSMENT/RECOMMENDATIONS:  30 year old male with a history of heavy alcohol use who presented to OSH 5/19 for abdominal pain, nausea and vomiting, admitted for alcohol hepatitis, ESLD, RADHA on HD, septic shock requiring pressors as well as necrotizing pancreatitis with large evolving necrotic collection. Transferred to [facility] for consideration of drainage of presumed infected necrotic collection. S/p EUS-guided necrosectomy (7/21/21).     #. Acute necrotizing pancreatitis with presumed infected walled off necrotic collection s/p percutaneous and endoscopic drainage   #. Septic shock, resolved  #. Leukocytosis  #. Ascites/Peritonitis  Unclear evolution of pancreatitis history but had a recent CT scan with large evolving necrotic collection, presumed infected. No obvious gas in collection. CT on admission to [facility] showing mature collection with enhancing wall measuring ~[id number]. With profound coagulopathy (liver disease + malnutrition +sepsis), INR 3.8 on transfer he was given FFP and Vit K, IR placed perc drain 6/29. Repeat imaging 7/18 with still large walled off necrotic collection that does appear amenable to endoscopic transluminal drainage, underwent cystgastrostomy stenting with necrosectomy on 7/21/21 (see procedure notes for details), large amount of solid debris identified. Would recommend ongoing discussions with the patient regarding goals of care.  Etiology: alcohol  Date of onset: 5/19  BISAP score on admission: unclear  Concurrent organ failure: respiratory (now extubated), renal (on HD), liver, cardiogenic (weaned off pressors 6/29)  Thromboses: none  Diabetes: no  Current nutrition access: PEG-J + po diet  Last imaging: CT scan with IV contrast 7/18  Infection/antiinfectives: (Enterococcus faecium VRE from L RP flank drain 7/14)                - Meropenem (6/29 - 7/18)     -  "Ciprofloxacin (7/19 - 7/21)                - Vancomycin (6/29 - 6/30)     - Micafungin (6/29 - 7/1)     - Ceftriaxone (7/21 - 7/23)      - Linezolid (7/21 - present)   Intervention:    6/30 - Left IR RP perc drain (24F)    7/2 - Diagnostic paracentesis (WBC 2663 - 74% PMN, SAAG <1.1, protein 4.1)     7/5 - Repeat Paracentesis (WBC 1801 - 61% PMN, lipase 48)    7/13 - Diag/therapeutic paracentesis ()    7/13 - PEG-J placement with T-tag gastropexy    7/21 - Repeat paracentesis    7/21 - EUS-cystgastrostomy and necrosectomy, Axios with co-axial Viabil    - Repeat CT abd/pelv with IV contrast today/tomorrow  - Repeat endoscopic necrosectomy later this week (currently scheduled for Thursday 7/29)  - drain management per IR (flushes etc)  - PRN paracentesis  - Please continue antibiotics for now until debridements are complete -- would not anticipate a need for prophylactic antibiotics after completion of current course  - pain meds per primary team    #. Severe malnutrition in the context of chronic illness  #. S/p PEG-J placement  #. Mild to moderate EPI  Patient with very poor oral intake over the course of his illness and was not meeting calorie requirements orally. S/p PEG-J with T-tag gastropexy (x3) 7/13.  - Tube feeds and oral diet as tolerated  - Continue PERT (defer to dietitian for dosing)  - oral diet as tolerated (can ADAT after his necrosectomy)  - Ideally would like to remove T-Tag buttons today however patient refused after multiple attempts of asking him to examine his abdomen     #. Alcohol hepatitis  #. Hyperbilirubinemia  #. Coagulopathy  #. Encephalopathy  Known heavy alcohol use. Reportedly received course of steroids at OSH for 1 week with \"some improvement\". Currently has primarily hyperbilirubinemia and mildly elevated AST. Unclear if he has underlying cirrhosis but certainly would fit with alcohol hepatitis vs elevated liver tests of critical illness/sepsis. Not candidate for steroids " "right now. He is receiving lactulose and Xifaxin for encephalopathy but having a lot of stool output. Hepatology notified of admission and they have weighed in on transplant status 7/22 (Makayla Glass APRN CNP)  - trend MELD labs  - continue lactulose/Xifaxin and titrate to 3 soft stools per day -- please record stool output  - alcohol abstinence  - hepatology team following peripherally    This patient has a very poor overall prognosis and agree with palliative care consultation and GOC discussions with the family    The patient was seen and discussed and plan agreed upon with GI staff, Dr. Ramos Norwood PA-C  Advanced Endoscopy/Pancreaticobiliary GI Service  [facility]  Text Page  ____________________________________________      Subjective: Nursing notes and 24hr events reviewed. Patient reports he is feeling \"awful\" today but also says that his abdomen feels okay. Does not want to go to CT scan because he doesn't want to move around. Had emesis last night.     ROS:   4 pt ROS negative unless noted in subjective.     Objective:  Blood pressure 117/60, pulse 111, temperature 97.6  F (36.4  C), temperature source Oral, resp. rate 16, height 1.676 m (5' 6\"), weight 76 kg (167 lb 8.8 oz), SpO2 96 %.  Gen: Laying in bed. Appears comfortable, fatigued.   HEENT: NCAT. Conjunctiva clear. Sclera +icteric, poor dentition  CV: Peripheral perfusion intact  Resp: non labored breathing  Abd: moderate distention, minimal dark brown output in RP perc drain, GJ tube with minimal output in G tube  Skin:  + jaundice  Ext: warm, well perfused  Mental status/Psych: alert and oriented, flat affect, appears fatigued    LABS:  BMP  Recent Labs   Lab 07/27/21  0718 07/26/21  0601 07/25/21  0553 07/24/21  0700    134 132* 130*   POTASSIUM 3.5 3.5 3.7 3.9   CHLORIDE 98 100 99 98   JANENE 8.9 8.7 8.1* 8.6   CO2 23 24 24 23   BUN 39* 28 20 32*   CR 3.96* 3.16* 2.32* 3.32*   * 79 90 77 "     CBC  Recent Labs   Lab 07/27/21  0718 07/26/21  0601 07/25/21  0553 07/24/21  0700   WBC 14.5* 16.4* 24.5* 24.3*   RBC 2.72* 2.79* 2.71* 2.80*   HGB 8.4* 8.5* 8.3* 8.7*   HCT 25.7* 26.5* 25.5* 26.3*   MCV 95 95 94 94   MCH 30.9 30.5 30.6 31.1   MCHC 32.7 32.1 32.5 33.1   RDW 16.5* 16.4* 16.7* 16.7*    219 208 240     INR  Recent Labs   Lab 07/27/21  0718 07/26/21  0601 07/25/21  0553 07/24/21  0700   INR 1.78* 1.79* 1.85* 1.86*     LFTs  Recent Labs   Lab 07/27/21 0718 07/26/21  0601 07/25/21  0553 07/24/21  0700   ALKPHOS 138 147 169* 144   AST 27 32 38 36   ALT 8 11 12 10   BILITOTAL 4.6* 5.1* 5.1* 5.6*   PROTTOTAL 7.1 6.9 7.0 7.0   ALBUMIN 1.9* 2.2* 2.3* 2.4*      PANC  No lab results found in last 7 days.      IMAGING:  EXAMINATION: CT ABDOMEN PELVIS W CONTRAST, 7/18/2021 11:29 AM     TECHNIQUE:  Helical CT images from the lung bases through the  symphysis pubis were obtained with IV contrast. Contrast: 119 cc  Isovue-[id number]      COMPARISON: 7/11/2021, 6/30/2021     HISTORY: Abdominal abscess/infection suspected     FINDINGS:     LUNG BASES: Normal heart size, trace pericardial effusion. Moderate  right pleural effusion with associated compressive atelectasis. Tiny  left basilar pleural effusion with associated compressive atelectasis.     ABDOMEN/PELVIS: Percutaneous gastrojejunostomy with tip in the  proximal jejunum. Unremarkable hepatic parenchyma. Ascites along the  inferior right hepatic lobe, extending into the pelvis and the left  paracolic gutter. Mild mural hyperemia of the gallbladder with mild  associated gallbladder thickening. Sequela of necrotizing pancreatitis  with associated 15 x 6 cm peripancreatic walled off necrosis with  drain in place. Overall, there is improved mesenteric stranding  surrounding the necrotic collection. There is viable enhancement of  the uncinate process and pancreatic head as well as distal body and  tail. Spleen measures 16 cm. There is extensive reactive  appearing  lymphadenopathy throughout the mesentery and retroperitoneum. There is  poor contrast uptake throughout the renal parenchyma. Visualized  ureters are normal. Normal caliber, edematous loops of small bowel.  Predominantly fluid-filled colon. Appendix is well visualized. Major  abdominal vasculature is patent. No concerning pelvic mass.     BONES: No acute or suspicious osseous abnormality.                                                                      IMPRESSION:  1. Continued sequelae of necrotizing pancreatitis with the 15 x 6 cm  peripancreatic walled off necrosis predominantly involving the lesser  sac with drain in place. Enhancement of the residual viable pancreatic  tissue in the uncinate, body, head, and the majority of the tail.  2. Poor parenchymal enhancement of the kidneys suggesting ATN.  3. Moderate amount of ascites with diffuse peritoneal enhancement and  dependant debris in the pelvis. Peritonitis cannot be ruled out.  4. Right pleural effusion with associated compressive atelectasis.  Trace left pleural effusion.

## 2021-07-27 NOTE — PLAN OF CARE
"[id number]:     Neuro: Lethargic, orientation difficult to assess, pt refuses to participate during assessment stating \"I'm done answering questions.\" Refused to go to IR for thoracentesis, stating he will do it tomorrow.   Cardiac: ST with HR 100s-110s. AVSS.   Respiratory: O2 sats stable on 4-6L NC   GI/: Anuric, on HD next HD run tomorrow 7/27. 5 BM this shift.  Diet/appetite: Regular, no appetite. Able to run TF, started at 1400 needs to be turned off at 2000. 1.2 FR  Activity:  Ax2 w/ lift   Pain: abdominal pain moderately controlled with PRN dilaudid.   Skin: No new deficits noted. Buttocks mepilex removed due to frequent stools   LDA's: triple lumen PICC infusing TKO between abx. G tube to gravity, currently clamped after dilaudid administration, unclamp around [id number]. J tube w/ TF. HD CVC.   Labs/imaging: refused covid swab stating he will do it tomorrow. Triggered sepsis, lactic pending.     Plan:Possible thoracentesis in IR tomorrow.  Continue with POC. Notify primary team with changes.    "

## 2021-07-27 NOTE — PROGRESS NOTES
Nephrology Progress Note  07/27/2021         Dax Villareal is a 30 year old male with h/o ETOH hepatitis, end stage liver disease, RADHA on HD, transferred from [facility] in [place] for septic shock on 6/28/21 for treatment of necrotizing pancreatitis and decompensated liver disease. He was initially admitted to [facility] 5/19/21.        Interval History :   Mr Villareal had GI stenting of pancreas 7/21 in OR with plan for repeat of this on 7/27.  Has significant abdominal pain but BP's are stable and he is willing to try HD today on TTS schedule.  Planning for 2-3L of UF on run today.  May be able to do thoracentesis while in OR for GI procedure coming up, trying to arrange to have CT done to further evaluate his chest and abdomen.        Assessment & Recommendations:   RADHA-Cr was 0.8 as recently as 5/19/2021 but now HD dependent, started RRT at [facility] prior to transfer to [facility] (exact date unclear but per family it was end of May).  Etiology likely multifactorial with contrast, sepsis/pancreatitis, likely HRS physiology contributing as well.  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  Continuing RRT as long as it is tolerated with regards to BP's, is functional enough to possibly do outpt HD.  MELD is in high 30's so has high 3 month mortality but continuing restorative cares for now.  Had GI stenting of pancreas on 7/21, planning for OR again on 7/29..                 -Line is TDC from PTA               -HD today, goal 2-3L of UF.       Volume status-Anuric, has abdominal distension and some mild peripheral edema. BP's are surprisingly stable given his abdominal pain and ongoing pancreatitis, planning to pull 2L of UF with run today.       Electrolytes/pH-K 3.5 and Na 133 today, on fluid restriction for hyponatremia.        Ca/phos/pth-Ca 8.9, corrects to normal with low albumin.       Anemia-Hgb 8.4, acute management per team, last PRBC's 7/14.       Nutrition-Taking PO, appetite ok.       Discussed  "with Dr Dow      Recommendations were communicated to primary team via verbal communication.     SOFIA Marlow CNS  Clinical Nurse Specialist  [phone]    Review of Systems:   I reviewed the following systems:  Gen: No fevers or chills  CV: No CP at rest  Resp: No SOB at rest  GI: No N/V    Physical Exam:   I/O last 3 completed shifts:  In: 1080 [P.O.:120; I.V.:200; Other:40; NG/GT:300]  Out: 1396 [Emesis/NG output:1356; Drains:40]   /60 (BP Location: Right arm)   Pulse 111   Temp 97.6  F (36.4  C) (Oral)   Resp 16   Ht 1.676 m (5' 6\")   Wt 76 kg (167 lb 8.8 oz)   SpO2 96%   BMI 27.04 kg/m       GENERAL APPEARANCE: Mild distress, + abd pain.    EYES:  scleral icterus, pupils equal  Pulmonary: lungs clear to auscultation. Off supplemental oxygen   CV: tachy   - Edema - no LE edema  GI: large distended, tender. Has retroperitoneal drain  MS: no evidence of inflammation in joints, no muscle tenderness  SKIN: no rash, warm, dry, no cyanosis  NEURO: alert/interactive  ACCESS: Right TDC     Labs:   All labs reviewed by me  Electrolytes/Renal - Recent Labs   Lab Test 07/27/21  0718 07/26/21  0601 07/25/21  0553 07/24/21  0700 07/15/21  0811 07/15/21  0443 07/14/21  0345 07/14/21  0345 07/13/21  0718    134 132* 130*   < > 131*  --  130* 131*   POTASSIUM 3.5 3.5 3.7 3.9   < > 3.3*  --  4.2 3.6   CHLORIDE 98 100 99 98   < > 98  --  98 98   CO2 23 24 24 23   < > 23  --  20 23   BUN 39* 28 20 32*   < > 26  --  42* 29   CR 3.96* 3.16* 2.32* 3.32*   < > 2.30*  --  3.35* 2.60*   * 79 90 77   < > 131*   < > 137* 94   JANENE 8.9 8.7 8.1* 8.6   < > 8.1*  --  8.6 8.5   MAG  --   --   --   --   --  1.6  --  1.6 1.5*   PHOS  --  5.2* 3.7 5.8*   < > 3.0  --  4.6* 4.3    < > = values in this interval not displayed.       CBC -   Recent Labs   Lab Test 07/27/21  0718 07/26/21  0601 07/25/21  0553   WBC 14.5* 16.4* 24.5*   HGB 8.4* 8.5* 8.3*    219 208       LFTs -   Recent Labs   Lab Test " 07/27/21  0718 07/26/21  0601 07/25/21  0553   ALKPHOS 138 147 169*   BILITOTAL 4.6* 5.1* 5.1*   ALT 8 11 12   AST 27 32 38   PROTTOTAL 7.1 6.9 7.0   ALBUMIN 1.9* 2.2* 2.3*       Iron Panel -   Recent Labs   Lab Test 07/19/21  0632   IRON 31*   IRONSAT 43           Current Medications:    sodium chloride 0.9%  250 mL Intravenous Once in dialysis/CRRT     sodium chloride 0.9%  300 mL Hemodialysis Machine Once     amylase-lipase-protease  2 capsule Oral TID w/meals     B and C vitamin Complex with folic acid  5 mL Per Feeding Tube Daily     cefTRIAXone  2 g Intravenous Q24H     folic acid  1 mg Oral Daily    Or     folic acid  1 mg Intravenous Daily     HYDROmorphone (STANDARD CONC)  2 mg Oral Once     lactulose  20 g Oral or NG Tube 4x Daily     linezolid  600 mg Intravenous Q12H     melatonin  6 mg Oral At Bedtime     - MEDICATION INSTRUCTIONS -   Does not apply Once     pantoprazole  40 mg Oral QAM AC     [Held by provider] potassium chloride  20 mEq Oral BID     rifaximin  550 mg Oral BID     sodium chloride (PF)  10 mL Irrigation Q8H     sodium chloride (PF)  9 mL Intracatheter During Dialysis/CRRT (from stock)     sodium chloride (PF)  9 mL Intracatheter During Dialysis/CRRT (from stock)     vitamin B1  100 mg Oral Daily    Or     thiamine  100 mg Intravenous Daily       - MEDICATION INSTRUCTIONS -       dextrose       Attestation:  This patient has been seen, examined and evaluated by me, Yu Dow MD as a shared visit with the NP/PA above.     In brief:  Dax Villareal is a 30 year old male with ESLD, RADHA and acute pancreatitis complicated by a large right pleural effusion. On RRT since the end of May. Still has large amount of necrotic tissue in abdomen per GI.  Reluctant to have thoracentesis in spite of difficulty breathing.       Physical exam, vital signs I and O and labs reviewed     Abd is distended and tender to palpation.  2 plus edema.  Absent breath sounds on right  Bili 5.1 alb 2       Assessment:  # ESLD: not a transplant candidate  # RADHA: Do not expect renal recovery  # pancreatitis: continued abd pain and not much drain output.  X lap planned for Thursday to debride tissue  # pleural effusion: pt is considering tap     Plan:   HD today     Yu Dow MD      [phone]

## 2021-07-27 NOTE — CODE/RAPID RESPONSE
Rapid Response Team Note    Assessment   A rapid response was called on Dax Villareal due to lactic acidosis. This presentation is likely due to ongoing necrotizing pancreatitis.    The patient was unable to provide a significant history.  He notes abdominal pain which is not new and tells me he feels generally awful.  A review of his chart shows he has been hospitalized with necrotizing pancreatitis, acute alcoholic hepatitis and now has renal failure and malnutrition.    I note he is currently on ceftriaxone and linezolid.  His tachycardia is unchanged and he has a low grade fever at present potentially secondary to necrotizing pancreatitis vs new infection.    Plan   -  Currently on fairly broad spectrum antibiotics.  LA is newly elevated with fairly normal levels earlier in his hospitalization.   I will order blood cultures now and a repeat lactic acid this evening.  Will defer fluids to primary team as he is anuric.  -  The Internal Medicine primary team was paged and currently awaiting a response.  -  Disposition: The patient will remain on the current unit. We will continue to monitor this patient closely.  -  Reassessment and plan follow-up will be performed by the rapid response team    SOFIA Christianson [facility] RRT AMCOM Job Code Contact #[phone]    Hospital Course   Brief Summary of events leading to rapid response:   See above    Admission Diagnosis:   Pancreatitis [K85.90]     Physical Exam   Temp: 99.6  F (37.6  C) Temp  Min: 97.4  F (36.3  C)  Max: 99.6  F (37.6  C)  Resp: 18 Resp  Min: 16  Max: 20  SpO2: 94 % SpO2  Min: 92 %  Max: 99 %  Pulse: 119 Pulse  Min: 107  Max: 121    No data recorded  BP: 101/43 Systolic (24hrs), Av , Min:101 , Max:121   Diastolic (24hrs), Av, Min:42, Max:63     I/Os: I/O last 3 completed shifts:  In: 380 [P.O.:120; I.V.:20; Other:20; NG/GT:150]  Out: 750 [Emesis/NG output:325; Drains:425]     Exam:   General: acutely ill appearing  Mental Status:  Disoriented, mumbles.  Ill appearing young man lying in bed  Abdomen sounds present, minimal abdominal tenderness  Patient refused remainder of exam    Significant Results and Procedures   Lactic Acid:   Recent Labs   Lab Test 07/26/21  1826 07/25/21  1756 07/24/21  1706 07/09/21  2215 07/08/21  2116 07/07/21  2106 07/06/21  2106   LACT 3.3* 1.9 1.0   < >  --   --   --    LACTS  --   --   --   --  1.1 0.9 1.5    < > = values in this interval not displayed.     CBC:   Recent Labs   Lab Test 07/26/21  0601 07/25/21  0553 07/24/21  0700   WBC 16.4* 24.5* 24.3*   HGB 8.5* 8.3* 8.7*   HCT 26.5* 25.5* 26.3*    208 240        Sepsis Evaluation   The patient is known to have an infection.  NO EVIDENCE OF SEPSIS at this time.  Vital sign, physical exam, and lab findings are due to necrotizing pancreatitis, multiorgan failure.

## 2021-07-27 NOTE — PLAN OF CARE
"Neuro: pt drowsy, oriented at start of shift, slowly confused around 2300 as to time and situation. Refusing to answer orientation questions this morning, states \"I can't answer right now\". Moves ext with generalized weakness, PERRLA 3 brisk.  Cardiac: 's, BP systolic 120-130's, Max temp 99.5  Resp: 4L NC 96%  GI/: Tube feed turned off at 2000. Pt anuric due to HD, no BM overnight. Pt had 1 green emesis overnight.   Skin: Jaundiced, generalized peeling/dryness, blanchable redness sacrum  Pain: Abdominal pain, dilaudid 2mg Q3 given for pain  LDA's: Triple lumen PICC Tko. Gtube to gravity 80cc output overnight, pancreatic drain 40cc. Jtube clamped. HD CVC.    Patient refused Covid testing and PICC dressing change. States \"we will do it tomorrow\". Educated pt on importance of dressing changes to prevent risk for infection.     Plan: Possible thoracentesis with IR today. Continue POC, notify primary team with changes.     "

## 2021-07-27 NOTE — PROGRESS NOTES
Patient refused PICC dressing change (dressing change due 7/26). Patient stated he would like it done tomorrow. Bedside nurse aware.

## 2021-07-27 NOTE — PROVIDER NOTIFICATION
07/26/21 2000   Call Information   Date of Call 07/26/21   Time of Call 1935   Name of person requesting the team Aydee Mendoza   Title of person requesting team RN   RRT Arrival time 1936   Time RRT ended 1950   Reason for call   Type of RRT Adult   Primary reason for call Sepsis suspected   Sepsis Suspected Elevated Lactate level   Was patient transferred from the ED, ICU, or PACU within last 24 hours prior to RRT call? No   SBAR   Situation Lactic Acid 3.3, WBC 16.4, .   Background EtOH abuse, ESLD, hepatic encephalopathy, ARF/HD.  Admit for acute necrotizing pancreatitis and septic shock.   Notable History/Conditions End-Stage disease;Organ failure   Assessment Pt. lethargic, opens eyes spontaneously.  Afebrile.  /regular.  Other vs at baseline.  Remains on 4L nc with 02 sats upper 90's.  Resting comfortably.   Interventions No interventions   Patient Outcome   Patient Outcome Stabilized on unit   RRT Team   Attending/Primary/Covering Physician Daniela 3 notified.   Date Attending Physician notified 07/26/21   Time Attending Physician notified 1935   Physician(s) Gordon Lucia NP   Lead RN Anali Mendoza   RT none   Post RRT Intervention Assessment   Post RRT Assessment Stable/Improved   Date Follow Up Done 07/26/21   Time Follow Up Done 2236   Comments VSS

## 2021-07-28 ENCOUNTER — ANESTHESIA EVENT (OUTPATIENT)
Dept: SURGERY | Facility: CLINIC | Age: 31
End: 2021-07-28
Payer: MEDICAID

## 2021-07-28 ENCOUNTER — APPOINTMENT (OUTPATIENT)
Dept: GENERAL RADIOLOGY | Facility: CLINIC | Age: 31
End: 2021-07-28
Attending: INTERNAL MEDICINE
Payer: MEDICAID

## 2021-07-28 ENCOUNTER — ANESTHESIA (OUTPATIENT)
Dept: SURGERY | Facility: CLINIC | Age: 31
End: 2021-07-28
Payer: MEDICAID

## 2021-07-28 LAB
ALBUMIN SERPL-MCNC: 1.9 G/DL (ref 3.4–5)
ALP SERPL-CCNC: 133 U/L (ref 40–150)
ALT SERPL W P-5'-P-CCNC: 9 U/L (ref 0–70)
ANION GAP SERPL CALCULATED.3IONS-SCNC: 8 MMOL/L (ref 3–14)
APPEARANCE FLD: ABNORMAL
AST SERPL W P-5'-P-CCNC: 27 U/L (ref 0–45)
BILIRUB SERPL-MCNC: 4.1 MG/DL (ref 0.2–1.3)
BUN SERPL-MCNC: 27 MG/DL (ref 7–30)
CALCIUM SERPL-MCNC: 8.4 MG/DL (ref 8.5–10.1)
CHLORIDE BLD-SCNC: 102 MMOL/L (ref 94–109)
CO2 SERPL-SCNC: 25 MMOL/L (ref 20–32)
COLOR FLD: ABNORMAL
CREAT SERPL-MCNC: 3.02 MG/DL (ref 0.66–1.25)
ERYTHROCYTE [DISTWIDTH] IN BLOOD BY AUTOMATED COUNT: 16.2 % (ref 10–15)
GFR SERPL CREATININE-BSD FRML MDRD: 26 ML/MIN/1.73M2
GLUCOSE BLD-MCNC: 98 MG/DL (ref 70–99)
GLUCOSE BLDC GLUCOMTR-MCNC: 107 MG/DL (ref 70–99)
GLUCOSE BLDC GLUCOMTR-MCNC: 107 MG/DL (ref 70–99)
GLUCOSE FLD-MCNC: 98 MG/DL
GRAM STAIN RESULT: NORMAL
HCT VFR BLD AUTO: 25.3 % (ref 40–53)
HGB BLD-MCNC: 8.3 G/DL (ref 13.3–17.7)
HOLD SPECIMEN: NORMAL
INR PPP: 1.86 (ref 0.85–1.15)
LACTATE SERPL-SCNC: 2.7 MMOL/L (ref 0.7–2)
LDH FLD L TO P-CCNC: 81 U/L
LDH SERPL L TO P-CCNC: 106 U/L (ref 85–227)
LYMPHOCYTES NFR FLD MANUAL: 48 %
MCH RBC QN AUTO: 30.4 PG (ref 26.5–33)
MCHC RBC AUTO-ENTMCNC: 32.8 G/DL (ref 31.5–36.5)
MCV RBC AUTO: 93 FL (ref 78–100)
MONOS+MACROS NFR FLD MANUAL: NORMAL %
NEUTS BAND NFR FLD MANUAL: 18 %
OTHER CELLS FLD MANUAL: 34 %
PLATELET # BLD AUTO: 171 10E3/UL (ref 150–450)
POTASSIUM BLD-SCNC: 3.2 MMOL/L (ref 3.4–5.3)
PROT FLD-MCNC: 4.7 G/DL
PROT SERPL-MCNC: 6.9 G/DL (ref 6.8–8.8)
PROT SERPL-MCNC: 7.1 G/DL (ref 6.8–8.8)
RADIOLOGIST FLAGS: ABNORMAL
RBC # BLD AUTO: 2.73 10E6/UL (ref 4.4–5.9)
RBC # FLD: 26 /UL
SARS-COV-2 RNA RESP QL NAA+PROBE: NEGATIVE
SODIUM SERPL-SCNC: 135 MMOL/L (ref 133–144)
UPPER GI ENDOSCOPY: NORMAL
WBC # BLD AUTO: 13.4 10E3/UL (ref 4–11)
WBC # FLD AUTO: 457 /UL

## 2021-07-28 PROCEDURE — 84155 ASSAY OF PROTEIN SERUM: CPT | Performed by: STUDENT IN AN ORGANIZED HEALTH CARE EDUCATION/TRAINING PROGRAM

## 2021-07-28 PROCEDURE — 85027 COMPLETE CBC AUTOMATED: CPT | Performed by: INTERNAL MEDICINE

## 2021-07-28 PROCEDURE — 36592 COLLECT BLOOD FROM PICC: CPT | Performed by: STUDENT IN AN ORGANIZED HEALTH CARE EDUCATION/TRAINING PROGRAM

## 2021-07-28 PROCEDURE — 36592 COLLECT BLOOD FROM PICC: CPT | Performed by: INTERNAL MEDICINE

## 2021-07-28 PROCEDURE — 99233 SBSQ HOSP IP/OBS HIGH 50: CPT | Mod: 25 | Performed by: INTERNAL MEDICINE

## 2021-07-28 PROCEDURE — 250N000009 HC RX 250: Performed by: STUDENT IN AN ORGANIZED HEALTH CARE EDUCATION/TRAINING PROGRAM

## 2021-07-28 PROCEDURE — C1874 STENT, COATED/COV W/DEL SYS: HCPCS | Performed by: INTERNAL MEDICINE

## 2021-07-28 PROCEDURE — 71045 X-RAY EXAM CHEST 1 VIEW: CPT | Mod: 26 | Performed by: RADIOLOGY

## 2021-07-28 PROCEDURE — 0W993ZZ DRAINAGE OF RIGHT PLEURAL CAVITY, PERCUTANEOUS APPROACH: ICD-10-PCS | Performed by: STUDENT IN AN ORGANIZED HEALTH CARE EDUCATION/TRAINING PROGRAM

## 2021-07-28 PROCEDURE — 999N000179 XR SURGERY CARM FLUORO L/T 5 MIN W STILL: Mod: TC

## 2021-07-28 PROCEDURE — 250N000009 HC RX 250: Performed by: INTERNAL MEDICINE

## 2021-07-28 PROCEDURE — 87070 CULTURE OTHR SPECIMN AEROBIC: CPT | Performed by: HOSPITALIST

## 2021-07-28 PROCEDURE — 89051 BODY FLUID CELL COUNT: CPT | Performed by: HOSPITALIST

## 2021-07-28 PROCEDURE — 87102 FUNGUS ISOLATION CULTURE: CPT | Performed by: STUDENT IN AN ORGANIZED HEALTH CARE EDUCATION/TRAINING PROGRAM

## 2021-07-28 PROCEDURE — 360N000075 HC SURGERY LEVEL 2, PER MIN: Performed by: INTERNAL MEDICINE

## 2021-07-28 PROCEDURE — 87205 SMEAR GRAM STAIN: CPT | Performed by: HOSPITALIST

## 2021-07-28 PROCEDURE — 710N000010 HC RECOVERY PHASE 1, LEVEL 2, PER MIN: Performed by: INTERNAL MEDICINE

## 2021-07-28 PROCEDURE — 99233 SBSQ HOSP IP/OBS HIGH 50: CPT | Mod: GC | Performed by: STUDENT IN AN ORGANIZED HEALTH CARE EDUCATION/TRAINING PROGRAM

## 2021-07-28 PROCEDURE — 999N000065 XR CHEST PORT 1 VIEW

## 2021-07-28 PROCEDURE — 85610 PROTHROMBIN TIME: CPT

## 2021-07-28 PROCEDURE — 82150 ASSAY OF AMYLASE: CPT | Performed by: STUDENT IN AN ORGANIZED HEALTH CARE EDUCATION/TRAINING PROGRAM

## 2021-07-28 PROCEDURE — 250N000013 HC RX MED GY IP 250 OP 250 PS 637: Performed by: INTERNAL MEDICINE

## 2021-07-28 PROCEDURE — 255N000002 HC RX 255 OP 636: Performed by: INTERNAL MEDICINE

## 2021-07-28 PROCEDURE — 82945 GLUCOSE OTHER FLUID: CPT | Performed by: HOSPITALIST

## 2021-07-28 PROCEDURE — U0003 INFECTIOUS AGENT DETECTION BY NUCLEIC ACID (DNA OR RNA); SEVERE ACUTE RESPIRATORY SYNDROME CORONAVIRUS 2 (SARS-COV-2) (CORONAVIRUS DISEASE [COVID-19]), AMPLIFIED PROBE TECHNIQUE, MAKING USE OF HIGH THROUGHPUT TECHNOLOGIES AS DESCRIBED BY CMS-2020-01-R: HCPCS | Performed by: HOSPITALIST

## 2021-07-28 PROCEDURE — 87075 CULTR BACTERIA EXCEPT BLOOD: CPT | Performed by: STUDENT IN AN ORGANIZED HEALTH CARE EDUCATION/TRAINING PROGRAM

## 2021-07-28 PROCEDURE — 999N000141 HC STATISTIC PRE-PROCEDURE NURSING ASSESSMENT: Performed by: INTERNAL MEDICINE

## 2021-07-28 PROCEDURE — 370N000017 HC ANESTHESIA TECHNICAL FEE, PER MIN: Performed by: INTERNAL MEDICINE

## 2021-07-28 PROCEDURE — 83615 LACTATE (LD) (LDH) ENZYME: CPT | Performed by: STUDENT IN AN ORGANIZED HEALTH CARE EDUCATION/TRAINING PROGRAM

## 2021-07-28 PROCEDURE — 0FPD80Z REMOVAL OF DRAINAGE DEVICE FROM PANCREATIC DUCT, VIA NATURAL OR ARTIFICIAL OPENING ENDOSCOPIC: ICD-10-PCS | Performed by: INTERNAL MEDICINE

## 2021-07-28 PROCEDURE — 83605 ASSAY OF LACTIC ACID: CPT | Performed by: STUDENT IN AN ORGANIZED HEALTH CARE EDUCATION/TRAINING PROGRAM

## 2021-07-28 PROCEDURE — 250N000011 HC RX IP 250 OP 636: Performed by: ANESTHESIOLOGY

## 2021-07-28 PROCEDURE — 250N000011 HC RX IP 250 OP 636: Performed by: HOSPITALIST

## 2021-07-28 PROCEDURE — 84157 ASSAY OF PROTEIN OTHER: CPT | Performed by: STUDENT IN AN ORGANIZED HEALTH CARE EDUCATION/TRAINING PROGRAM

## 2021-07-28 PROCEDURE — 82040 ASSAY OF SERUM ALBUMIN: CPT | Performed by: INTERNAL MEDICINE

## 2021-07-28 PROCEDURE — 250N000013 HC RX MED GY IP 250 OP 250 PS 637: Performed by: STUDENT IN AN ORGANIZED HEALTH CARE EDUCATION/TRAINING PROGRAM

## 2021-07-28 PROCEDURE — 272N000001 HC OR GENERAL SUPPLY STERILE: Performed by: INTERNAL MEDICINE

## 2021-07-28 PROCEDURE — 0DHA7UZ INSERTION OF FEEDING DEVICE INTO JEJUNUM, VIA NATURAL OR ARTIFICIAL OPENING: ICD-10-PCS | Performed by: INTERNAL MEDICINE

## 2021-07-28 PROCEDURE — 250N000011 HC RX IP 250 OP 636: Performed by: PHYSICIAN ASSISTANT

## 2021-07-28 PROCEDURE — 250N000011 HC RX IP 250 OP 636: Performed by: STUDENT IN AN ORGANIZED HEALTH CARE EDUCATION/TRAINING PROGRAM

## 2021-07-28 PROCEDURE — 258N000003 HC RX IP 258 OP 636: Performed by: STUDENT IN AN ORGANIZED HEALTH CARE EDUCATION/TRAINING PROGRAM

## 2021-07-28 PROCEDURE — 250N000025 HC SEVOFLURANE, PER MIN: Performed by: INTERNAL MEDICINE

## 2021-07-28 PROCEDURE — 120N000005 HC R&B MS OVERFLOW UMMC

## 2021-07-28 PROCEDURE — 258N000003 HC RX IP 258 OP 636

## 2021-07-28 PROCEDURE — 80053 COMPREHEN METABOLIC PANEL: CPT | Performed by: INTERNAL MEDICINE

## 2021-07-28 PROCEDURE — C1769 GUIDE WIRE: HCPCS | Performed by: INTERNAL MEDICINE

## 2021-07-28 PROCEDURE — C1876 STENT, NON-COA/NON-COV W/DEL: HCPCS | Performed by: INTERNAL MEDICINE

## 2021-07-28 PROCEDURE — 32555 ASPIRATE PLEURA W/ IMAGING: CPT | Performed by: STUDENT IN AN ORGANIZED HEALTH CARE EDUCATION/TRAINING PROGRAM

## 2021-07-28 DEVICE — STENT TRANSGASTRIC AXIOS 15MMX10MMX146CM M00553750
Type: IMPLANTABLE DEVICE | Site: PANCREAS | Status: NON-FUNCTIONAL
Removed: 2021-08-06

## 2021-07-28 DEVICE — STENT SOLUS BILIARY 10FRX03CM DBL PIGTAIL W/INTRO G25670
Type: IMPLANTABLE DEVICE | Site: PANCREAS | Status: NON-FUNCTIONAL
Removed: 2021-08-06

## 2021-07-28 RX ORDER — ONDANSETRON 2 MG/ML
INJECTION INTRAMUSCULAR; INTRAVENOUS PRN
Status: DISCONTINUED | OUTPATIENT
Start: 2021-07-28 | End: 2021-07-28

## 2021-07-28 RX ORDER — PROPOFOL 10 MG/ML
INJECTION, EMULSION INTRAVENOUS PRN
Status: DISCONTINUED | OUTPATIENT
Start: 2021-07-28 | End: 2021-07-28

## 2021-07-28 RX ORDER — FENTANYL CITRATE-0.9 % NACL/PF 10 MCG/ML
PLASTIC BAG, INJECTION (ML) INTRAVENOUS PRN
Status: DISCONTINUED | OUTPATIENT
Start: 2021-07-28 | End: 2021-07-28

## 2021-07-28 RX ORDER — SODIUM CHLORIDE, SODIUM LACTATE, POTASSIUM CHLORIDE, CALCIUM CHLORIDE 600; 310; 30; 20 MG/100ML; MG/100ML; MG/100ML; MG/100ML
INJECTION, SOLUTION INTRAVENOUS CONTINUOUS PRN
Status: DISCONTINUED | OUTPATIENT
Start: 2021-07-28 | End: 2021-07-28

## 2021-07-28 RX ORDER — SODIUM CHLORIDE, SODIUM LACTATE, POTASSIUM CHLORIDE, CALCIUM CHLORIDE 600; 310; 30; 20 MG/100ML; MG/100ML; MG/100ML; MG/100ML
INJECTION, SOLUTION INTRAVENOUS CONTINUOUS
Status: DISCONTINUED | OUTPATIENT
Start: 2021-07-28 | End: 2021-07-28 | Stop reason: HOSPADM

## 2021-07-28 RX ORDER — DEXAMETHASONE SODIUM PHOSPHATE 4 MG/ML
INJECTION, SOLUTION INTRA-ARTICULAR; INTRALESIONAL; INTRAMUSCULAR; INTRAVENOUS; SOFT TISSUE PRN
Status: DISCONTINUED | OUTPATIENT
Start: 2021-07-28 | End: 2021-07-28

## 2021-07-28 RX ORDER — ONDANSETRON 2 MG/ML
4 INJECTION INTRAMUSCULAR; INTRAVENOUS EVERY 30 MIN PRN
Status: DISCONTINUED | OUTPATIENT
Start: 2021-07-28 | End: 2021-07-28 | Stop reason: HOSPADM

## 2021-07-28 RX ORDER — ONDANSETRON 4 MG/1
4 TABLET, ORALLY DISINTEGRATING ORAL EVERY 30 MIN PRN
Status: DISCONTINUED | OUTPATIENT
Start: 2021-07-28 | End: 2021-07-28 | Stop reason: HOSPADM

## 2021-07-28 RX ORDER — FENTANYL CITRATE 50 UG/ML
50 INJECTION, SOLUTION INTRAMUSCULAR; INTRAVENOUS EVERY 5 MIN PRN
Status: DISCONTINUED | OUTPATIENT
Start: 2021-07-28 | End: 2021-07-28 | Stop reason: HOSPADM

## 2021-07-28 RX ORDER — HYDROMORPHONE HCL IN WATER/PF 6 MG/30 ML
0.2 PATIENT CONTROLLED ANALGESIA SYRINGE INTRAVENOUS EVERY 5 MIN PRN
Status: DISCONTINUED | OUTPATIENT
Start: 2021-07-28 | End: 2021-07-28 | Stop reason: HOSPADM

## 2021-07-28 RX ORDER — LIDOCAINE HYDROCHLORIDE 20 MG/ML
INJECTION, SOLUTION INFILTRATION; PERINEURAL PRN
Status: DISCONTINUED | OUTPATIENT
Start: 2021-07-28 | End: 2021-07-28

## 2021-07-28 RX ORDER — LIDOCAINE 40 MG/G
CREAM TOPICAL
Status: DISCONTINUED | OUTPATIENT
Start: 2021-07-28 | End: 2021-09-03

## 2021-07-28 RX ORDER — LIDOCAINE 40 MG/G
CREAM TOPICAL
Status: DISCONTINUED | OUTPATIENT
Start: 2021-07-28 | End: 2021-07-29

## 2021-07-28 RX ORDER — HYDROMORPHONE HYDROCHLORIDE 1 MG/ML
0.5 INJECTION, SOLUTION INTRAMUSCULAR; INTRAVENOUS; SUBCUTANEOUS
Status: COMPLETED | OUTPATIENT
Start: 2021-07-28 | End: 2021-07-31

## 2021-07-28 RX ORDER — IOPAMIDOL 510 MG/ML
INJECTION, SOLUTION INTRAVASCULAR PRN
Status: DISCONTINUED | OUTPATIENT
Start: 2021-07-28 | End: 2021-07-28 | Stop reason: HOSPADM

## 2021-07-28 RX ORDER — FENTANYL CITRATE 50 UG/ML
INJECTION, SOLUTION INTRAMUSCULAR; INTRAVENOUS PRN
Status: DISCONTINUED | OUTPATIENT
Start: 2021-07-28 | End: 2021-07-28

## 2021-07-28 RX ADMIN — DEXAMETHASONE SODIUM PHOSPHATE 4 MG: 4 INJECTION, SOLUTION INTRA-ARTICULAR; INTRALESIONAL; INTRAMUSCULAR; INTRAVENOUS; SOFT TISSUE at 14:20

## 2021-07-28 RX ADMIN — HYDROMORPHONE HYDROCHLORIDE 2 MG: 1 SOLUTION ORAL at 21:05

## 2021-07-28 RX ADMIN — HYDROMORPHONE HYDROCHLORIDE 2 MG: 1 SOLUTION ORAL at 10:47

## 2021-07-28 RX ADMIN — ONDANSETRON 4 MG: 2 INJECTION INTRAMUSCULAR; INTRAVENOUS at 15:00

## 2021-07-28 RX ADMIN — SUGAMMADEX 200 MG: 100 INJECTION, SOLUTION INTRAVENOUS at 15:35

## 2021-07-28 RX ADMIN — FENTANYL CITRATE 100 MCG: 50 INJECTION, SOLUTION INTRAMUSCULAR; INTRAVENOUS at 16:23

## 2021-07-28 RX ADMIN — Medication 5 ML: at 10:47

## 2021-07-28 RX ADMIN — PANCRELIPASE 1 CAPSULE: 24000; 76000; 120000 CAPSULE, DELAYED RELEASE PELLETS ORAL at 21:15

## 2021-07-28 RX ADMIN — Medication 100 MCG: at 14:27

## 2021-07-28 RX ADMIN — SODIUM CHLORIDE, POTASSIUM CHLORIDE, SODIUM LACTATE AND CALCIUM CHLORIDE: 600; 310; 30; 20 INJECTION, SOLUTION INTRAVENOUS at 13:51

## 2021-07-28 RX ADMIN — LIDOCAINE HYDROCHLORIDE 100 MG: 20 INJECTION, SOLUTION INFILTRATION; PERINEURAL at 14:05

## 2021-07-28 RX ADMIN — Medication 550 MG: at 21:07

## 2021-07-28 RX ADMIN — THIAMINE HCL TAB 100 MG 100 MG: 100 TAB at 10:46

## 2021-07-28 RX ADMIN — Medication 100 MCG: at 14:21

## 2021-07-28 RX ADMIN — LINEZOLID 600 MG: 600 INJECTION, SOLUTION INTRAVENOUS at 03:59

## 2021-07-28 RX ADMIN — Medication 40 MG: at 10:46

## 2021-07-28 RX ADMIN — PIPERACILLIN SODIUM AND TAZOBACTAM SODIUM 2.25 G: 2; .25 INJECTION, POWDER, LYOPHILIZED, FOR SOLUTION INTRAVENOUS at 10:48

## 2021-07-28 RX ADMIN — HYDROMORPHONE HYDROCHLORIDE 0.2 MG: 0.2 INJECTION, SOLUTION INTRAMUSCULAR; INTRAVENOUS; SUBCUTANEOUS at 16:45

## 2021-07-28 RX ADMIN — LACTULOSE 20 G: 10 SOLUTION ORAL; RECTAL at 10:48

## 2021-07-28 RX ADMIN — ROCURONIUM BROMIDE 40 MG: 10 INJECTION INTRAVENOUS at 14:05

## 2021-07-28 RX ADMIN — PROPOFOL 30 MG: 10 INJECTION, EMULSION INTRAVENOUS at 14:07

## 2021-07-28 RX ADMIN — HYDROMORPHONE HYDROCHLORIDE 1 MG: 1 SOLUTION ORAL at 06:52

## 2021-07-28 RX ADMIN — PIPERACILLIN SODIUM AND TAZOBACTAM SODIUM 2.25 G: 2; .25 INJECTION, POWDER, LYOPHILIZED, FOR SOLUTION INTRAVENOUS at 03:59

## 2021-07-28 RX ADMIN — PROPOFOL 150 MG: 10 INJECTION, EMULSION INTRAVENOUS at 14:05

## 2021-07-28 RX ADMIN — PIPERACILLIN SODIUM AND TAZOBACTAM SODIUM 2.25 G: 2; .25 INJECTION, POWDER, LYOPHILIZED, FOR SOLUTION INTRAVENOUS at 21:30

## 2021-07-28 RX ADMIN — SODIUM CHLORIDE, POTASSIUM CHLORIDE, SODIUM LACTATE AND CALCIUM CHLORIDE 500 ML: 600; 310; 30; 20 INJECTION, SOLUTION INTRAVENOUS at 05:59

## 2021-07-28 RX ADMIN — FENTANYL CITRATE 50 MCG: 50 INJECTION, SOLUTION INTRAMUSCULAR; INTRAVENOUS at 14:06

## 2021-07-28 RX ADMIN — Medication 550 MG: at 10:47

## 2021-07-28 RX ADMIN — Medication 200 MCG: at 14:12

## 2021-07-28 RX ADMIN — HYDROXYZINE HYDROCHLORIDE 50 MG: 25 TABLET, FILM COATED ORAL at 11:26

## 2021-07-28 RX ADMIN — LACTULOSE 20 G: 10 SOLUTION ORAL; RECTAL at 21:11

## 2021-07-28 RX ADMIN — FOLIC ACID 1 MG: 1 TABLET ORAL at 10:46

## 2021-07-28 RX ADMIN — MELATONIN TAB 3 MG 6 MG: 3 TAB at 21:06

## 2021-07-28 RX ADMIN — SODIUM BICARBONATE 325 MG: 325 TABLET ORAL at 21:14

## 2021-07-28 ASSESSMENT — ACTIVITIES OF DAILY LIVING (ADL)
ADLS_ACUITY_SCORE: 20

## 2021-07-28 ASSESSMENT — MIFFLIN-ST. JEOR: SCORE: 1652.75

## 2021-07-28 NOTE — ANESTHESIA PROCEDURE NOTES
Airway       Patient location during procedure: OR       Procedure Start/Stop Times: 7/28/2021 2:07 PM  Staff -        Anesthesiologist:  Behrens, Christopher J, MD       Resident/Fellow: Collin Raymond MD       Performed By: anesthesiologist and with residents       Procedure performed by resident/fellow/CRNA in presence of a teaching physician.    Consent for Airway        Urgency: elective  Indications and Patient Condition       Indications for airway management: gera-procedural       Induction type:intravenous       Mask difficulty assessment: 1 - vent by mask    Final Airway Details       Final airway type: endotracheal airway       Successful airway: ETT - single  Endotracheal Airway Details        ETT size (mm): 7.5       Cuffed: yes       Successful intubation technique: direct laryngoscopy       DL Blade Type: MAC 3       Adjucts: stylet       Position: Right       Measured from: lips       Secured at (cm): 21    Post intubation assessment        Placement verified by: capnometry, equal breath sounds and chest rise        Number of attempts at approach: 1       Secured with: other (comment) (Tube tamer)       Ease of procedure: easy       Dentition: Intact and Unchanged

## 2021-07-28 NOTE — ANESTHESIA PREPROCEDURE EVALUATION
Anesthesia Pre-Procedure Evaluation    Patient: Dax Villareal   MRN: [id number] : 1990        Preoperative Diagnosis: Necrotizing pancreatitis [K85.91]   Procedure : Procedure(s):  ESOPHAGOGASTRODUODENOSCOPY (EGD)     Past Medical History:   Diagnosis Date     2008      Past Surgical History:   Procedure Laterality Date     ENDOSCOPIC INSERTION TUBE GASTROSTOMY N/A 2021    Procedure: Upper endoscopy, INSERTION, PEG-J TUBE and Gastropexy;  Surgeon: Rayshawn Will MD;  Location: [facility] OR     ENDOSCOPIC ULTRASOUND UPPER GASTROINTESTINAL TRACT (GI) N/A 2021    Procedure: ENDOSCOPIC ULTRASOUND, ESOPHAGOSCOPY / UPPER GASTROINTESTINAL TRACT (GI) with cyst gastrostomy, dilation;  Surgeon: Guru Yecenia Chacko MD;  Location: [facility] OR     IR ABSCESS TUBE CHANGE  2021     ORTHOPEDIC SURGERY Right     fracture repair      Allergies   Allergen Reactions     Tylenol [Acetaminophen] Itching      Social History     Tobacco Use     Smoking status: Current Every Day Smoker     Smokeless tobacco: Never Used   Substance Use Topics     Alcohol use: Yes      Wt Readings from Last 1 Encounters:   21 75.5 kg (166 lb 7.2 oz)        Anesthesia Evaluation            ROS/MED HX  ENT/Pulmonary:       Neurologic:       Cardiovascular:    (-) murmur and wheezes   METS/Exercise Tolerance:     Hematologic:       Musculoskeletal:       GI/Hepatic:       Renal/Genitourinary:     (+) renal disease, type: ARF,     Endo:       Psychiatric/Substance Use:     (+) alcohol abuse     Infectious Disease:       Malignancy:       Other:            Physical Exam    Airway        Mallampati: II   TM distance: > 3 FB   Neck ROM: full   Mouth opening: > 3 cm    Respiratory Devices and Support         Dental  no notable dental history         Cardiovascular          Rhythm and rate: regular and normal (-) no systolic click and no murmur    Pulmonary   pulmonary exam normal        breath sounds clear to auscultation    (-) no wheezes        OUTSIDE LABS:  CBC:   Lab Results   Component Value Date    WBC 13.4 (H) 07/28/2021    WBC 14.5 (H) 07/27/2021    HGB 8.3 (L) 07/28/2021    HGB 8.4 (L) 07/27/2021    HCT 25.3 (L) 07/28/2021    HCT 25.7 (L) 07/27/2021     07/28/2021     07/27/2021     BMP:   Lab Results   Component Value Date     07/28/2021     07/27/2021    POTASSIUM 3.2 (L) 07/28/2021    POTASSIUM 3.5 07/27/2021    CHLORIDE 102 07/28/2021    CHLORIDE 98 07/27/2021    CO2 25 07/28/2021    CO2 23 07/27/2021    BUN 27 07/28/2021    BUN 39 (H) 07/27/2021    CR 3.02 (H) 07/28/2021    CR 3.96 (H) 07/27/2021    GLC 98 07/28/2021     (H) 07/27/2021     COAGS:   Lab Results   Component Value Date    PTT 97 (H) 06/28/2021    INR 1.86 (H) 07/28/2021    FIBR 225 06/28/2021     POC:   Lab Results   Component Value Date     (H) 07/05/2021     HEPATIC:   Lab Results   Component Value Date    ALBUMIN 1.9 (L) 07/28/2021    PROTTOTAL 6.9 07/28/2021    ALT 9 07/28/2021    AST 27 07/28/2021    ALKPHOS 133 07/28/2021    BILITOTAL 4.1 (H) 07/28/2021    ADALBERTO 28 06/28/2021     OTHER:   Lab Results   Component Value Date    LACT 2.7 (H) 07/28/2021    JANENE 8.4 (L) 07/28/2021    PHOS 5.2 (H) 07/26/2021    MAG 1.6 07/15/2021    LIPASE 199 06/28/2021    CRP 16.9 (H) 10/06/2020       Anesthesia Plan    ASA Status:  2   NPO Status:  NPO Appropriate    Anesthesia Type: General.     - Airway: ETT   Induction: Intravenous.   Maintenance: Inhalation.        Consents    Anesthesia Plan(s) and associated risks, benefits, and realistic alternatives discussed. Questions answered and patient/representative(s) expressed understanding.     - Discussed with:  Patient      - Extended Intubation/Ventilatory Support Discussed: Yes.      - Patient is DNR/DNI Status: No    Use of blood products discussed: Yes.     - Discussed with: Patient.     Postoperative Care    Pain management: IV analgesics.   PONV prophylaxis: Ondansetron (or  other 5HT-3), Dexamethasone or Solumedrol     Comments:                Christopher J. Behrens, MD

## 2021-07-28 NOTE — CONSULTS
Procedure Note    Attending:     Gaurav Tabares D.O.  Internal Medicine, Hospitalist  [facility]  Pager: [phone]    Resident: ADILENE  Procedure: Right Thoracentesis  Indication: Dyspnea, hypoxia  Risk Assessment: acute hypoxic respiratory failure, anemia hb 8.3, coagulopathy INR 1.86  Pre-procedure diagnosis: pleural effusion  Post-procedure diagnosis: pleural effusion    The risks and benefits of the procedure were explained to Mr. Dax Villareal who expressed understanding and opted to proceed.  Consent was obtained and placed in the chart and the patient was placed on pulse oximetry.  A time out was performed.    An ultrasound probe was used to identify an area of pleural fluid in the right hemithorax.  This area was prepped and draped in the usual sterile fashion.  Ten ml of 1% lidocaine was instilled and fluid located using ultrasound guidance.  A small incision was made with an 11 blade.  The thoracentesis catheter and needle were inserted above the rib until fluid obtained then the needle removed.    Using a one-way valve, 1800 ml of brown / tan serous colored fluid was removed. A specimen was sent for analysis.    The catheter was removed with the patient exhaling and an occlusive dressing placed.       Ultrasound revealed normal lung sliding after the procedure and a chest radiograph is pending.    The tolerated the procedure well.  Please contact the Consult and Procedure Service if any concerns or complications arise.       Gaurav Tabares D.O.  Internal Medicine, [facility]  [facility]  Pager: [phone]    DOS:  7/28/2021

## 2021-07-28 NOTE — PROVIDER NOTIFICATION
07/27/21 2200   Call Information   Date of Call 07/27/21   Time of Call 2144   Name of person requesting the team Ailyn Wilson   Title of person requesting team RN   RRT Arrival time 2148   Time RRT ended 2155   Reason for call   Type of RRT Adult   Primary reason for call Sepsis suspected   Sepsis Suspected Elevated Lactate level;Heart Rate > 100;RR > 20, SaO2 <90% OR increasing O2 need;WBC <4 or >12   Was patient transferred from the ED, ICU, or PACU within last 24 hours prior to RRT call? No   SBAR   Situation LA 3.9, , RR 22   Background 30 year old male with PMHx significant for alcohol use disorder who has had a prolonged hospitalization at [facility] in [place] for acute alcohol hepatitis/end stage liver disease complicated by hepatic encephalopathy, acute renal failure requiring hemodialysis, acute hypoxemic respiratory failure and septic shock secondary to acute necrotizing pancreatitis. Transferred to [facility] ICU on 6/28/2021 for further management of necrotizing pancreatitis    Notable History/Conditions End-Stage disease;Organ failure;Recent surgery   Assessment Pt drowsy but interactive, c/o feeling unwell, and is groaning. Appears jaundiced   Interventions Fluid bolus;Other (describe);Meds;Labs  (CT scan)   Patient Outcome   Patient Outcome Stabilized on unit   RRT Team   Attending/Primary/Covering Physician Daniela 3   Date Attending Physician notified 07/27/21   Time Attending Physician notified 2144   Physician(s) VENUS Salcedo   Lead RN Zeina Acevedo   Post RRT Intervention Assessment   Post RRT Assessment Stable/Improved   Date Follow Up Done 07/28/21   Time Follow Up Done 0130   Comments VSS, LA 2.7, pain improved.

## 2021-07-28 NOTE — PROGRESS NOTES
Sister called and stated that she has not recived any care confrances lately for [facility]. She is requesting a Dr to contact her.  I passed this information onto his floor nurse so that this could happen.

## 2021-07-28 NOTE — PROGRESS NOTES
Resident/Fellow Attestation   I, Cathryn Zuniga, was present with the medical/NAHUM student who participated in the service and in the documentation of the note.  I have verified the history and personally performed the physical exam and medical decision making.  I agree with the assessment and plan of care as documented in the note.    Cathryn Zuniga MD  PGY1  Date of Service (when I saw the patient): 07/28/21    [facility]    Progress Note - Maroon 3 Service        Date of Admission:  6/28/2021    Assessment & Plan   Dax Villareal is a 30 year old male with hx of alcohol use disorder admitted on 6/28/2021 after recent prolonged admission at [facility] in [place] who has severe acute alcoholic hepatitis c/b HE, ESRD requiring HD, and malnutrition in the setting of acute necrotizing pancreatitis, s/p PEG-J placement on 7/13. He arrived with acute respiratory failure and septic shock that have resolved, and his SBP has been appropriately treated. He requires continued management for ESRD, hepatic encephalopathy, infected pancreatic pseudocyst, nutritional support via PEG-J, and secondary bacterial peritonitis.     Today:  - RR overnight d/t lactate 3.9; decreased to 2.7 after 500 ml + 250 ml bolus, abx broadened to Zosyn + Linezolid. Repeat blood cx obtained.  - Thoracentesis completed with IR, cell count/cultures pending  - CT showed displacement of pancreatic stent; Stent removal by GI today via ERCP  - Repeat endoscopic necrosectomy pushed, likely next week    # Lactic acidosis, improving  Patient has triggered sepsis protocol for past two nights. Lactic of 3.9 on 7/27 evening, now improved to 2.7 after 250 mL bolus. Abx were escalated from ceftriaxone to Zosyn. Ddx includes: bacteremia 2/2 infected hepatic hydrothorax/nec panc/peritonitis v hypoperfusion 2/2 dialysis fluid removal. Patient is afebrile, WBC stable/down trending with no growth in blood clx, and appears  about at his baseline clinically, making acute infection less likely. Patient had dialysis on 7/27 and had 1L pulled; after dialysis, pt c/o worsening abd pain and had emesis x3. Lactic acidosis most likely 2/2 fluid removal from dialysis and fluid loss from emesis.  - Continue Zosyn + Linezolid  - close monitoring of fluid status     # Necrotizing pancreatitis w/ infected pseudocyst s/p RP Drain and Cystogastrostomy, improving  # Septic Shock, resolved   # Bacterial Peritonitis, secondary to infected pseudocyst    # Leukocytosis, stable  # Peripancreatic fluid collection growing VRE   Etiology of the septic shock that he came in with likely due to necrotizing pancreatitis, infected pseudocyst, and peritonitis. Had percutaneous RP drain placed for drainage of pancreatic collection. Diagnostic para returned with peritonitis. Per discussion with ID, ongoing leukocytosis is not unexpected with nec panc. CT 7/18 showed unchanged moderate ascites and collection size. Cystgastrostomy on 7/21 with therapeutic para.     CT 7/27 showed pancreatic stent partially in adjacent mesentery and displaced gastrostomy balloon, GI consulted. CT also showed mild peritoneal thickening, unable to rule out ongoing peritonitis.    - Continue Zosyn + Linezolid through endoscopic debridement  - Pancreatic stent removal by GI today via ERCP  - Repeat endoscopic necrosectomy pushed, likely next week  - PRN paracentesis  - Daily CBC  - MAP goal > 65  - GI following, appreciate recs               - Pancreatic enzymes    - For back pain: Tylenol PRN and Dilaudid 2mg q3h PRN  - Blood cultures w/ NG after 12 hrs     # Right hepatic hydrothorax  7/19 evening, patient developed increasing cough with thick, clear secretions. CXR on 7/19 w/ increased RLL haziness; ddx includes pleural effusion v pneumonia v atelectasis. Pt had ongoing increased WOB with use of accessory muscles, diminished lung sounds. Repeat CXR on 7/24 with increasing effusion. Has  been refusing thoracentesis last several days.     Despite this, patient looks improved on 7/27 with very minimal increased WOB without accessory muscle use and clearer lung sounds in RLL. CT on 7/28 showed large volume R hepatic hydrothorax with complete atelectasis of right lung.     - IR thoracentesis completed today  - Follow up thora labs/cultures     # Severe malnutrition in setting of chronic illness  PEG-J placed 7/13. Procedure went well, patient and nutrition have discussed plan to run TFs during the day only from 12-6 pm at 70 mL/hr and work on PO intake, he intermittently refuses these however.      - Dietitian consult for new tube feeds, appreciate recs              - Continue TFs at 70 mL/hr, 12-6 pm (though intermittently refusing)               - Cont panc enzymes  - Regular Diet, thin liquids per SLP  - Continue multivitamins  - Pantoprazole 40 mg IV daily  - Zofran PRN     # Alcoholic hepatitis complicated by portal hypertension, ascites, and HE - improving   # Hx of Alcohol use disorder c/b withdrawal   # Anemia  # Coagulopathy   Patient with a history of marked alcohol use and alcoholic hepatitis in the past. Was given a course of steroids (1 week) during previous admission at OSH. Patient mental status has been stable, alert and oriented x4 for a couple weeks. Patient has consistently had 1-2 pRBC infusions weekly d/t Hgb drops of 0.3-0.5 every day (etilogy likely chronic liver disease, chronic kidney disease, phlebotomy, dilution, poor nutrition, ect). Hgb stable around 8.3-8.4 for several days.      - Lactulose 20g QID and PRN and rifaximin 550mg BID - as patient allows  - Trend MELD labs  - Daily folic acid and thiamine  - Delirium precautions  - Melatonin 6mg at bedtime  - Daily CBC   - Transfuse if hemoglobin < 7, platelet < 10 or < 30 with bleeding  - Continue to monitor for signs of bleeding     MELD-Na score: 30 at 7/28/2021  3:55 AM  MELD score: 29 at 7/28/2021  3:55 AM  Calculated  from:  Serum Creatinine: 3.02 mg/dL at 7/28/2021  3:55 AM  Serum Sodium: 135 mmol/L at 7/28/2021  3:55 AM  Total Bilirubin: 4.1 mg/dL at 7/28/2021  3:55 AM  INR(ratio): 1.86 at 7/28/2021  3:55 AM  Age: 30 years     # ESRD on HD (MWF)  # Hypervolemia causing acute respiratory failure, resolved  Nephrology note 7/21 indicates that patient has been on renal replacement therapy for two months and pt status has most likely transitioned from ARF  to ESRD at this point.          > Hemodialysis as per renal              - Midodrine 10 mg PRN before dialysis sessions     > Daily chemistry labs, strict I/O, and daily weights  - Aspiration precautions  - Supplemental oxygen as needed to keep sats above 89%     # Goals of Care  Please see additional interval note for in depth details regarding conversation on 7/9 about goals of care. Patient's long term prognosis is still quite poor given his multi organ failure. Care conference on 7/15, pt did not wish to participate. Met with sisters Noni (on phone) and Melyssa and close friend Leroy with palliative, nephrology and general medicine teams today to discuss Dax's current improving status, poor prognosis and wishes to continue full cares. Plan to meet Fridays at 4 pm with teams and family for weekly updates.     - Hepatology confirmed pt will not be a transplant candidate     Diet: Snacks/Supplements Adult: Ensure Clear; With Meals  Fluid restriction 1200 ML FLUID  Adult Formula Drip Feeding: Specified Time Vital 1.5; Jejunostomy; Goal Rate: 70; mL/hr; From: 12:00 PM; 6:00 PM; Medication - Feeding Tube Flush Frequency: At least 15-30 mL water before and after medication administration and with tube clogging;...  NPO per Anesthesia Guidelines for Procedure/Surgery Except for: Meds    DVT Prophylaxis: Pneumatic Compression Devices  Rdz Catheter: Not present  Fluids: 10 mL NS TKO  Central Lines: PRESENT  PICC Triple Lumen 06/28/21 Left-Site Assessment: WDL  CVC Double Lumen  Right-Site Assessment: WDL  Code Status: Full Code      Disposition Plan   Expected discharge: >7 days, recommended to transitional care unit once  nutrition improved, infected pancreatic pseudocyst is well managed and plan for dialysis is in place.     The patient's care was discussed with the Attending Physician, Dr. Taylor.    Jaime Amin, MS3  Medical Student  [address]  Securely message with the Vocera Web Console (learn more here)  Text page via eegoes Paging/Directory  Please see sign in/sign out for up to date coverage information  ______________________________________________________________________    Interval History   Overnight, patient triggered sepsis protocol; responsive to 250 mL bolus. Increased abx coverage by switching to Zosyn. Nursing notes reviewed.    This morning, patient is breathing deeper again with increased work of breathing compared to yesterday. Unable to appreciate any lung sounds on R side. Has not vomited this AM, nausea helped by compazine. Regular BMs.    Data reviewed today: I reviewed all medications, new labs and imaging results over the last 24 hours. I personally reviewed the abdominal CT image(s) showing ongoing necrotizing pancreatitis, R hepatic hydrothorax with associated atelectasis and moderate to large volume ascites .    Physical Exam   Vital Signs: Temp: 97.6  F (36.4  C) Temp src: Axillary BP: 116/67 Pulse: 103   Resp: 18 SpO2: 98 % O2 Device: Nasal cannula Oxygen Delivery: 2 LPM  Weight: 166 lbs 7.16 oz    Gen: Lying down in bed, appears similar to yesterday.   HEENT: Conjunctival icterus  CV: Tachycardic. Regular rhythm.   Resp: Increased WOB. No wheezing or coughing. Left lung CTA. Diminished breath sounds over entire R lung.  Abd: Refused palpation today, PEG J in place without surrounding erythema. Abdomen less distended than last week.  Skin: Jaundiced.  Ext: Well perfused, no  edema  Neuro: Oriented x3, responding appropriately to questions, tremulous    Data   Recent Labs   Lab 07/28/21  1038 07/28/21  0355 07/27/21  0718 07/26/21  0601   WBC  --  13.4* 14.5* 16.4*   HGB  --  8.3* 8.4* 8.5*   MCV  --  93 95 95   PLT  --  171 188 219   INR  --  1.86* 1.78* 1.79*   NA  --  135 133 134   POTASSIUM  --  3.2* 3.5 3.5   CHLORIDE  --  102 98 100   CO2  --  25 23 24   BUN  --  27 39* 28   CR  --  3.02* 3.96* 3.16*   ANIONGAP  --  8 12 10   JANENE  --  8.4* 8.9 8.7   GLC  --  98 114* 79   ALBUMIN  --  1.9* 1.9* 2.2*   PROTTOTAL 6.9 7.1 7.1 6.9   BILITOTAL  --  4.1* 4.6* 5.1*   ALKPHOS  --  133 138 147   ALT  --  9 8 11   AST  --  27 27 32     Recent Results (from the past 24 hour(s))   CT Abdomen Pelvis w/o Contrast   Result Value    Radiologist flags Malpositioned GJ tube, cystogastrostomy stent and (AA)    Narrative    EXAMINATION: CT ABDOMEN PELVIS W/O CONTRAST, 7/27/2021 11:56 PM    TECHNIQUE:  Helical CT images from the lung bases through the  symphysis pubis were obtained without IV contrast.     COMPARISON: 7/19/2021, 7/11/2021, 6/29/2021    HISTORY: Abdominal abscess/infection suspected; Known nec/ panc, s/p  drain placement, imaging in anticipation of endoscopic debridement    FINDINGS: Limited exam secondary to noncontrast technique.    LUNG BASES: CVC tip at the low SVC. Normal heart size, no pericardial  effusion. Large right pleural effusion and trace left pleural  effusion. There is complete collapse of the right lateral with fluid  density within the right main bronchus suggestive of mucous impaction.    ABDOMEN/PELVIS: Cystogastrostomy in place with the pancreatic drain  along the anterior edge of the collection, probably partially external  to the walled off pancreatic necrotic collection. Gastrojejunostomy  terminating in the mid jejunum. The gastrostomy tube balloon is  positioned more anteriorly compared to prior and appears to be outside  the gastric lumen. Percutaneous drain  coiled within the pancreatic  necrotic collection. The area wall osteonecrosis currently measures  approximately 14 x 6.3 cm, slightly reduced compared to 7/19/2021,  which when it measured 15 x 7 cm. Enlarged liver measuring 17.6 cm  craniocaudally with associated splenomegaly suggestive of portal  hypertension. Mild gallbladder wall thickening. No radiodense  gallstones. Splenomegaly. Adrenals and kidneys are unremarkable.  Decompressed small bowel and colon. Moderate volume ascites, primarily  in the pelvis. Innumerable reactive appearing mesenteric and  retroperitoneal lymph nodes. No suspicious pelvic mass.    BONES: No acute or suspicious osseous abnormality.      Impression    IMPRESSION:  1. Ongoing evolution of necrotizing pancreatitis with slight decrease  in size of walled off pancreatic necrosis.  2. Cystogastrostomy in place. However, the pancreatic stent does not  appear completely within the walled off pancreatic necrotic collection  and is at least partially in the adjacent mesentery. The gastrostomy  balloon also appears to be outside the gastric lumen bulging into the  subcutaneous plane. Consider GI consultation.  3. Moderate-large volume ascites with small volume dependent debris in  the pelvis. Mild peritoneal thickening. Ongoing peritonitis cannot be  excluded.  4. Complete atelectasis of the right lung with intrabronchial fluid  density suggesting a mucous plug. Large right pleural effusion.  5. Hepatosplenomegaly with dilated paraumbilical vein suggestive of  portal hypertension.        [Critical Result: Malpositioned GJ tube, cystogastrostomy stent and  right lung collapse]    Finding was identified on 7/28/2021 12:01 AM.     Dr. Caro(Mar[facility] 3)was contacted by Dr. Jasvir MD at 7/28/2021 9:19  AM and verbalized understanding of the critical finding.     I have personally reviewed the examination and initial interpretation  and I agree with the findings.    LISA LYNCH MD         SYSTEM  ID:  [id number]   XR Chest Port 1 View    Narrative    Portable chest    INDICATION: Status post right thoracentesis    COMPARISON: 7/24/2021    FINDINGS: Decreased right pleural effusion. No definitive  pneumothorax. Right IJ catheter tip the distal SVC. Left PICC tip at  the distal SVC. Right heart border partially obscured but the heart  does not appear grossly enlarged.    CHELLE STARKS MD         SYSTEM ID:  [id number]

## 2021-07-28 NOTE — PLAN OF CARE
Assumed care from 1900 to 0730.    Vitals:    07/27/21 2150 07/27/21 2300 07/28/21 0009 07/28/21 0417   BP: 119/54 123/71  120/70   BP Location:  Right arm  Right arm   Pulse: 115 111  113   Resp: 22 20 20   Temp: 98.2  F (36.8  C) 97.9  F (36.6  C)  97.9  F (36.6  C)   TempSrc: Axillary Oral  Axillary   SpO2:  94%  96%   Weight:   75.5 kg (166 lb 7.2 oz)    Height:            Vitals: VSS ex ST with HR in the 110s.   Pain: Patient endorsed worsening abdominal pain. Improved with PRN dilaudid.   Cardiovascular: Sinus tachycardia with HR in the 110s.   Neuro: Disoriented to time. Lethargic and occasionally irritable. [place] scores of 0. Scheduled lactulose given.   Respiratory: Sating > 95% on 2-3L NC. Frequent congested cough.   GI: Loose watery BM x 4. Emesis x 3. Endorsed nausea. Relieved with PRN compazine and zofran.  : Anuric, on HD.   Activity: Assist of 2 with lift. Denied repositioning. Weight shifting independently.  Skin: Mepilex on coccyx. No new deficits noted.   LDAs: G tube open to gravity. Panc drain. PICC line is TKO for antibiotics and running LR bolus. Red port seems to be occluded.      See provider notification note regarding critical lactic level.      Summary:     Plan: Continue with POC. Notify primary team with changes.

## 2021-07-28 NOTE — ANESTHESIA POSTPROCEDURE EVALUATION
Patient: Dax Villareal    Procedure(s):  ESOPHAGOGASTRODUODENOSCOPY (EGD), gastrostomy apposition, gastro-jejunostomy tube exchange, Necrosectomy, stent exchange.    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    Anesthesia Type:  General    Note:  Disposition: Admission   Postop Pain Control: Uneventful            Sign Out: Well controlled pain   PONV: No   Neuro/Psych: Uneventful            Sign Out: Acceptable/Baseline neuro status   Airway/Respiratory: Uneventful            Sign Out: Acceptable/Baseline resp. status   CV/Hemodynamics: Uneventful            Sign Out: Acceptable CV status; No obvious hypovolemia; No obvious fluid overload   Other NRE: NONE   DID A NON-ROUTINE EVENT OCCUR? No           Last vitals:  Vitals Value Taken Time   /62 07/28/21 1700   Temp 36.3  C (97.4  F) 07/28/21 1600   Pulse 105 07/28/21 1701   Resp 11 07/28/21 1701   SpO2 94 % 07/28/21 1701   Vitals shown include unvalidated device data.    Electronically Signed By: Campbell Haji MD  July 28, 2021  5:27 PM

## 2021-07-28 NOTE — OP NOTE
Upper GI Endoscopy 07/28/2021  1:56 PM [address] [place] [address] [phone]     Endoscopy Department   _______________________________________________________________________________   Patient Name: Dax Villareal              Procedure Date: 7/28/2021 1:56 PM   MRN: [id number]                       Account Number: [id number]   YOB: 1990             Admit Type: Inpatient   Age: 30                                Gender: Male   Note Status: Finalized                Attending MD: Rayshawn Will MD   Total Sedation Time:                     _______________________________________________________________________________       Procedure:           Upper GI endoscopy   Indications:         Replace PEG-J tube, Walled off necrosis post endoscopic                        transluminal drainage   Providers:           Rayshawn Will MD, Palmira Pozo MD   Patient Profile:     Mr Villareal is a 29yo alcoholic who presented with                        concomitant alcoholic hepatitis and necrotizing                        pancreatitis among other acute critical medical issues                        who has had his large necrotic cavity managed by                        combined percutaneous and transgastric drianage                        techniques. Interval CT suggests migration of the metal                        transgastric stents to the edge or outside of the                        primary compartment as well as burried bumper syndrome.                        We now proceed to upper endoscopy for managed.   Referring MD:        Brianna Burks   Medicines:           General Anesthesia, Antibiotics as scheduled   Complications:       No immediate complications.   _______________________________________________________________________________   Procedure:           Pre-Anesthesia Assessment:                        - Prior to the  procedure, a History and Physical was                        performed, and patient medications and allergies were                        reviewed. The patient is competent. The risks and                        benefits of the procedure and the sedation options and                        risks were discussed with the patient. All questions                        were answered and informed consent was obtained. Patient                        identification and proposed procedure were verified by                        the nurse in the pre-procedure area. Mental Status                        Examination: alert and oriented. Airway Examination:                        Mallampati Class II (the uvula but not tonsillar pillars                        visualized). Respiratory Examination: poor air movement.                        CV Examination: tachycardia noted. ASA Grade Assessment:                        III - A patient with severe systemic disease. After                        reviewing the risks and benefits, the patient was deemed                        in satisfactory condition to undergo the procedure. The                        anesthesia plan was to use general anesthesia.                        Immediately prior to administration of medications, the                        patient was re-assessed for adequacy to receive                        sedatives. The heart rate, respiratory rate, oxygen                        saturations, blood pressure, adequacy of pulmonary                        ventilation, and response to care were monitored                        throughout the procedure. The physical status of the                        patient was re-assessed after the procedure. After                        obtaining informed consent, the endoscope was passed                        under direct vision. Throughout the procedure, the                        patient's blood pressure, pulse, and oxygen saturations        "                 were monitored continuously. The endoscopes were                        introduced through the mouth, and advanced to the                        jejunum. The upper GI endoscopy was accomplished without                        difficulty. The patient tolerated the procedure well.                                                                                     Findings:        The patient was supine throughout. The left flank drain was well seated        as was the gastrostomy tube on the abdomen. The existing T tag buttons        were  films demonstrated the gastrojejunostomy tube and the flank        drain along with coaxial Axios and Viabil stents. We began with managing        the buried bumper. A pediatric gastroscope was passed per mouth        demonstrating an unremarkable esophagus. The stomach was notable for the        transgastric Axios with coaxial Viabil across the posterior body both of        which were patent, well seated and well expanded. The gastrostomy        balloon was found burried in the wall of the antrum while the J tube was        coursing without curl across the pyrous. The bulb, sweep and remainder        of the duodenum were unremakable as was the proximal jejunum seen        subsequently. Using endoscopic guidance a single needled T tag was        passed along the gastrostomy tract and then once in the stomach, across        the tract to allow cinching. In doing this process the existing GJ tube        balloon was punctured. We therefore passed the pediatric gastroscope        across the mature tract to the proximal jejunum and exchanged the        endoscope for an 0.035\" Glidewire. Over this wire a fresh 18F 45cm on        piece gastrojejunostomy tube was positioned with tip in the jejunum and        without curl. The T tag cinhed closing the bumper defect. The balloon        was inflated and the tube secured. Next we exchaged for a 1T        gastroscope. The " "Viabil was removed in toto allowing passage of the        gastroscope across the Axios. Here we found a narrow tract leading to a        small compartment of necrosis. This necrosis was cleared using serial        suction manuevers. Next we removed the Axios and this exposed a second        short tract to a large compartment containing the percutaneous drain        abundant necrosis. A long 0.025\" Visiglide wire was then passed into        this compartment and over this wire a [id number] covered metal Axios PRAKASH        stent was deployed with distal flange in the large collection and        proximal in the stomach. In doing so the narrow tract and compartment        were essentially sealed. We then injected contrast through the drain        this rapidly filled the cavity and was suctioned. We then deployed a 10F        3cm Solus bumper stent across the Axios.                                                                                     Impression:          - Mod 22 for duration, complexity, technique                        - Fluoroscopy suggesting ongoing right pleural effusion                        despite recent thoracentesis                        - Uncomplicated removal of in situ external T tag buttons                        - Buried bumper with gastric ulceration status post                        uncomplicated apposition of the gastrostomy walls using                        single full thickness T tag deployment as above                        - Successful exchange of malfunctioning                        gastrojejunostomy tube (18F 45cm)                        - Uncomplicated removal of both cystogastrostomy stents                        (transluminal Axios with coaxial Viabil)                        - Cystogastrostomy tract leading to two compartments;                        one smaller, more narrow tract to a small area of                        necrosis representing the previous posiiton of the         "                transluminal stents and a large orifice, large                        compartment containing liquid and solid necrosis with                        visualization of the percutaneous drain                        - Successful placement of a [id number] covered PRAKASH stent                        from stomach to larger compartment, sealing off the                        smaller compartment following necrosectomy                        - Successful placement of coaxial [id number] Solus bumper                        stent   Recommendation:      - General anesthesia recovery with return to the floor                        when appropriate                        - Aggressive irrigation (100cc every 4h) through the                        left flank drain now that the drain is in continuity                        with the transgastric stents                        - Repeat upper endoscopy with necrosectomy once this                        tract matures in 7-10 days unless course dictates a more                        urgen intervention                        - GJ tube may be used as previous without delay                        - All medications may resume without delay                        - DO NOT REMOVE T tag button for 14 days by cutting the                        underlying blue string                        - The findings and recommendations were discussed with                        the patient and their family                                                                                       electronically signed by REAGAN Will

## 2021-07-28 NOTE — ANESTHESIA CARE TRANSFER NOTE
Patient: Dax Villareal    Procedure(s):  ESOPHAGOGASTRODUODENOSCOPY (EGD), gastrostomy apposition, gastro-jejunostomy tube exchange, Necrosectomy, stent exchange.    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    Anesthesia Type:   General     Note:    Oropharynx: oropharynx clear of all foreign objects and spontaneously breathing  Level of Consciousness: drowsy  Oxygen Supplementation: face mask    Independent Airway: airway patency satisfactory and stable  Dentition: dentition unchanged  Vital Signs Stable: post-procedure vital signs reviewed and stable  Report to RN Given: handoff report given  Patient transferred to: PACU  Comments: Patient transferred to PACU in stable condition, breathing spontaneously on face mask, VSS.  Report given to RN.  Handoff Report: Identifed the Patient, Identified the Reponsible Provider, Reviewed the pertinent medical history, Discussed the surgical course, Reviewed Intra-OP anesthesia mangement and issues during anesthesia, Set expectations for post-procedure period and Allowed opportunity for questions and acknowledgement of understanding      Vitals:  Vitals Value Taken Time   /74 07/28/21 1551   Temp     Pulse 106 07/28/21 1552   Resp     SpO2 94 % 07/28/21 1552   Vitals shown include unvalidated device data.    Electronically Signed By: SOFIA Phipps CRNA  July 28, 2021  3:54 PM

## 2021-07-28 NOTE — PROGRESS NOTES
Rapid Response Team Note    Assessment   In assessment a rapid response was called on Dax Villareal due to SIRS/Sepsis trigger. This presentation is likely due to necrotizing pancreatitis, ESLD, hypovolemia following HD, possible recurrent SBP vs other and worsened by ESLD, ESRD.     Plan   -  Broaden to Zosyn given worsening abdominal pain, rising LA. Continue Linezolid   -  250 ml bolus over 2 hours  -  CT AP ordered but not obtained: will move to stat  -  Repeat BCx  -  Would repeat para when able   -  Repeat LA 2330 as ordered by primary team   -  The Internal Medicine primary team was able to be reached and they are in agreement with the above plan.  -  Disposition: The patient will remain on the current unit. We will continue to monitor this patient closely.  -  Reassessment and plan follow-up will be performed by the primary team    Beatris Diaz PA-C  [facility] Job Code Contact #[phone]    Hospital Course   Brief Summary of events leading to rapid response:   Patient tx from OSH with etoh related complications. He was noted to have higher LA tend starting last evening. Patient reports feeling worse tonight that today. He has been vomiting. Denies hematemesis. This started sometime after HD. He does not make urine. Loose stools at BL from lactulose. Reports worsening epigastric pain. Denies chest pain or dyspnea. Per chart review, patient was to have repeat CT today. Per d/w radiology, has been refusing CT    Admission Diagnosis:   Pancreatitis [K85.90]     Physical Exam   Temp: 98.2  F (36.8  C) Temp  Min: 97.2  F (36.2  C)  Max: 98.2  F (36.8  C)  Resp: (P) 22 Resp  Min: 16  Max: 22  SpO2: 94 % SpO2  Min: 93 %  Max: 98 %  Pulse: 115 Pulse  Min: 106  Max: 120    No data recorded  BP: 119/54 Systolic (24hrs), Av , Min:96 , Max:119   Diastolic (24hrs), Av, Min:50, Max:75     I/Os: I/O last 3 completed shifts:  In: 1220 [P.O.:120; I.V.:200; Other:50; NG/GT:360]  Out: 2561 [Emesis/NG  output:1481; Drains:70; Other:1010]     Exam:   General: appears uncomfortable  Mental Status: AAOx4.  CV: Tachycardic to 100s, no murmur noted  Resp: CTAB on room air  Abdomen: Diffusely tender with grimace with epigastric palpation. GJT in place, no surrounding erythema/edema/drainage. Bowel sounds active  Skin: Jaundice  Peripheral: No peripheral edema, distal pulses palpable     Significant Results and Procedures   Lactic Acid:   Recent Labs   Lab Test 07/27/21  2132 07/26/21  2046 07/26/21  1826 07/09/21  2215 07/08/21  2116 07/07/21  2106 07/06/21  2106   LACT 3.9* 3.4* 3.3*   < >  --   --   --    LACTS  --   --   --   --  1.1 0.9 1.5    < > = values in this interval not displayed.     CBC:   Recent Labs   Lab Test 07/27/21  0718 07/26/21  0601 07/25/21  0553   WBC 14.5* 16.4* 24.5*   HGB 8.4* 8.5* 8.3*   HCT 25.7* 26.5* 25.5*    219 208        Sepsis Evaluation   The patient is known to have an infection.  Dax Villareal meets SIRS criteria AND has a lactate >2 or other evidence of acute organ damage.  These vital signs, lab and physical exam findings constitute a diagnosis of SEVERE SEPSIS.    Sepsis Time-Zero (time severe sepsis diagnosis confirmed): prior      Anti-infectives (From now, onward)    Start     Dose/Rate Route Frequency Ordered Stop    07/26/21 1400  cefTRIAXone (ROCEPHIN) 2 g vial to attach to  ml bag for ADULTS or NS 50 ml bag for PEDS      2 g  over 30 Minutes Intravenous EVERY 24 HOURS 07/26/21 1350      07/26/21 1400  linezolid (ZYVOX) infusion 600 mg      600 mg  over 60 Minutes Intravenous EVERY 12 HOURS 07/26/21 1350      07/05/21 0830  rifaximin (XIFAXAN) oral suspension 550 mg      550 mg Oral 2 TIMES DAILY 07/05/21 0757          Current antibiotic coverage requires additional antibiotics for anaerobic, PsA source.    3 Hour Severe Sepsis Bundle Completion:  1. Initial Lactic Acid result shown above. Repeat lactic acid ordered for 2 hours from now.   2. Blood Cultures  before Antibiotics: Yes  3. Broad Spectrum Antibiotics Administered: yes  4. Fluids: 250 bolus, anuric

## 2021-07-28 NOTE — PROVIDER NOTIFICATION
-------------------CRITICAL LAB VALUE-------------------    Lab Value: Lactate of 3.9.   Time of notification: 2132  MD notified: Daniela night float intern   Patient status: Patient is endorsing significant abdominal pain that has gotten worse since this afternoon. 3 episodes of emesis this evening. Sinus tachycardia with HR in the 100s-110s. All other VSS. Patient had HD this afternoon, 1 L of fluid removed before venous line clotted.   Temp:  [97.2  F (36.2  C)-98.2  F (36.8  C)] 97.9  F (36.6  C)  Pulse:  [106-120] 111  Resp:  [16-22] 20  BP: ()/(50-75) 123/71  SpO2:  [93 %-98 %] 94 %  Orders received: LR and NS bolus of 125 mL each. Blood cultures and repeat lactic acid. Discontinued rocephin and replaced with zosyn q6h. Abdominal/pelvis CT.     Repeat lactic acid at 0005 was 2.7.     CT showed evolution of nec. Pancreatitis, moderate/large volume ascites, and right hepatic hydrothorax (pleural effusion) and right lung atelectasis.

## 2021-07-28 NOTE — PROGRESS NOTES
Nephrology Progress Note  07/28/2021         Dax Villareal is a 30 year old male with h/o ETOH hepatitis, end stage liver disease, RADHA on HD, transferred from [facility] in [place] for septic shock on 6/28/21 for treatment of necrotizing pancreatitis and decompensated liver disease. He was initially admitted to [facility] 5/19/21.        Interval History :   Mr Villareal had GI stenting of pancreas 7/21 in OR.  ON had critical lactate so was seen and given IVF.  CT showed displaced pancreatic stent. Going to OR today.  Had HD yesterday but pt clotted system (did not use heparin, did not loose blood as it was able to be returned) after 2h.  Labs reasonable today likely due to poor nutrition as we had a sub-optimal run but will hold on repeating today per pt's request.  Will plan for HD tomorrow.        Assessment & Recommendations:   RADHA-Cr was 0.8 as recently as 5/19/2021 but now HD dependent, started RRT at [facility] prior to transfer to [facility] (exact date unclear but per family it was end of May).  Etiology likely multifactorial with contrast, sepsis/pancreatitis, likely HRS physiology contributing as well.  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  Continuing RRT as long as it is tolerated with regards to BP's, is functional enough to possibly do outpt HD.  MELD is in high 30's so has high 3 month mortality but continuing restorative cares for now.  Had GI stenting of pancreas on 7/21, planning for OR again on 7/29..                 -Line is TDC from PTA               -Holding on run today, had 2h run yesterday and labs are stable likely due to minimal intake from nutrition standpoint.  Plan for HD tomorrow.     Volume status-Anuric, pulled 1L of UF yesterday and was about net even, mostly is net even with GI losses but BP's support UF so we will continue to pull with runs.      Electrolytes/pH-K 3.2 and Na 135 today, on fluid restriction for hyponatremia.  Improved after short run yesterday.   "      Ca/phos/pth-Ca 8.4, corrects to normal with low albumin.       Anemia-Hgb 8.3, acute management per team, last PRBC's 7/14.       Nutrition-Taking PO, appetite ok.       Discussed with Dr Dow      Recommendations were communicated to primary team via verbal communication.     SOFIA Marlow CNS  Clinical Nurse Specialist  [phone]    Review of Systems:   I reviewed the following systems:  Gen: No fevers or chills  CV: No CP at rest  Resp: No SOB at rest  GI: No N/V    Physical Exam:   I/O last 3 completed shifts:  In: 2250 [P.O.:540; I.V.:780; Other:20; NG/GT:240; IV Piggyback:250]  Out: 1880 [Emesis/NG output:815; Drains:55; Other:1010]   /67 (BP Location: Right arm)   Pulse 103   Temp 97.6  F (36.4  C) (Axillary)   Resp 18   Ht 1.676 m (5' 6\")   Wt 75.5 kg (166 lb 7.2 oz)   SpO2 98%   BMI 26.87 kg/m       GENERAL APPEARANCE: Mild distress, + abd pain.    EYES:  scleral icterus, pupils equal  Pulmonary: lungs clear to auscultation. Off supplemental oxygen   CV: tachy   - Edema - no LE edema  GI: large distended, tender. Has retroperitoneal drain  MS: no evidence of inflammation in joints, no muscle tenderness  SKIN: no rash, warm, dry, no cyanosis  NEURO: alert/interactive  ACCESS: Right TDC     Labs:   All labs reviewed by me  Electrolytes/Renal - Recent Labs   Lab Test 07/28/21  0355 07/27/21  0718 07/26/21  0601 07/25/21  0553 07/24/21  0700 07/15/21  0811 07/15/21  0443 07/14/21  0345 07/14/21  0345 07/13/21  0718    133 134 132* 130*   < > 131*  --  130* 131*   POTASSIUM 3.2* 3.5 3.5 3.7 3.9   < > 3.3*  --  4.2 3.6   CHLORIDE 102 98 100 99 98   < > 98  --  98 98   CO2 25 23 24 24 23   < > 23  --  20 23   BUN 27 39* 28 20 32*   < > 26  --  42* 29   CR 3.02* 3.96* 3.16* 2.32* 3.32*   < > 2.30*  --  3.35* 2.60*   GLC 98 114* 79 90 77   < > 131*   < > 137* 94   JANENE 8.4* 8.9 8.7 8.1* 8.6   < > 8.1*  --  8.6 8.5   MAG  --   --   --   --   --   --  1.6  --  1.6 1.5*   PHOS  --   " --  5.2* 3.7 5.8*   < > 3.0  --  4.6* 4.3    < > = values in this interval not displayed.       CBC -   Recent Labs   Lab Test 07/28/21  0355 07/27/21 0718 07/26/21  0601   WBC 13.4* 14.5* 16.4*   HGB 8.3* 8.4* 8.5*    188 219       LFTs -   Recent Labs   Lab Test 07/28/21  1038 07/28/21  0355 07/27/21  0718 07/26/21  0601   ALKPHOS  --  133 138 147   BILITOTAL  --  4.1* 4.6* 5.1*   ALT  --  9 8 11   AST  --  27 27 32   PROTTOTAL 6.9 7.1 7.1 6.9   ALBUMIN  --  1.9* 1.9* 2.2*       Iron Panel -   Recent Labs   Lab Test 07/19/21  0632   IRON 31*   IRONSAT 43           Current Medications:    alteplase  2 mg Intravenous Q2H     amylase-lipase-protease  2 capsule Oral TID w/meals     B and C vitamin Complex with folic acid  5 mL Per Feeding Tube Daily     folic acid  1 mg Oral Daily    Or     folic acid  1 mg Intravenous Daily     lactulose  20 g Oral or NG Tube 4x Daily     linezolid  600 mg Intravenous Q12H     melatonin  6 mg Oral At Bedtime     pantoprazole  40 mg Oral QAM AC     piperacillin-tazobactam  2.25 g Intravenous Q6H     [Held by provider] potassium chloride  20 mEq Oral BID     rifaximin  550 mg Oral BID     sodium chloride (PF)  10 mL Irrigation Q8H     vitamin B1  100 mg Oral Daily    Or     thiamine  100 mg Intravenous Daily       - MEDICATION INSTRUCTIONS -       dextrose       Attestation:  This patient has been seen, examined and evaluated by me, Yu Dow MD as a shared visit with the NP/PA above.     In brief:  Dax Villareal is a 30 year old male with ESLD, RADHA and acute pancreatitis complicated by a large right pleural effusion. On RRT since the end of May. Still has large amount of necrotic tissue in abdomen per GI.  Pt to go to OR today for ERCP      Physical exam, vital signs I and O and labs reviewed     Abd is distended and tender to palpation.  2 plus edema.  Absent breath sounds on right  Bili 5.1 alb 2      Assessment:  # ESLD: not a transplant candidate  # RADHA: Do not  expect renal recovery  # pancreatitis: continued abd pain and not much drain output.  ERCP today  # pleural effusion: PN says pt had thoracentesis today but no official note yet     Plan:   HD tomorrow     Yu Dow MD      [phone]

## 2021-07-29 LAB
ALBUMIN SERPL-MCNC: 1.8 G/DL (ref 3.4–5)
ALP SERPL-CCNC: 133 U/L (ref 40–150)
ALT SERPL W P-5'-P-CCNC: 12 U/L (ref 0–70)
ANION GAP SERPL CALCULATED.3IONS-SCNC: 13 MMOL/L (ref 3–14)
AST SERPL W P-5'-P-CCNC: 33 U/L (ref 0–45)
BILIRUB SERPL-MCNC: 3.8 MG/DL (ref 0.2–1.3)
BUN SERPL-MCNC: 40 MG/DL (ref 7–30)
CALCIUM SERPL-MCNC: 8.8 MG/DL (ref 8.5–10.1)
CHLORIDE BLD-SCNC: 100 MMOL/L (ref 94–109)
CO2 SERPL-SCNC: 21 MMOL/L (ref 20–32)
CREAT SERPL-MCNC: 3.79 MG/DL (ref 0.66–1.25)
ERYTHROCYTE [DISTWIDTH] IN BLOOD BY AUTOMATED COUNT: 16.1 % (ref 10–15)
GFR SERPL CREATININE-BSD FRML MDRD: 20 ML/MIN/1.73M2
GLUCOSE BLD-MCNC: 152 MG/DL (ref 70–99)
GLUCOSE BLDC GLUCOMTR-MCNC: 87 MG/DL (ref 70–99)
HCT VFR BLD AUTO: 25.5 % (ref 40–53)
HGB BLD-MCNC: 8.6 G/DL (ref 13.3–17.7)
INR PPP: 1.86 (ref 0.85–1.15)
MCH RBC QN AUTO: 31.2 PG (ref 26.5–33)
MCHC RBC AUTO-ENTMCNC: 33.7 G/DL (ref 31.5–36.5)
MCV RBC AUTO: 92 FL (ref 78–100)
PLATELET # BLD AUTO: 151 10E3/UL (ref 150–450)
POTASSIUM BLD-SCNC: 3.3 MMOL/L (ref 3.4–5.3)
PROT SERPL-MCNC: 7.1 G/DL (ref 6.8–8.8)
RBC # BLD AUTO: 2.76 10E6/UL (ref 4.4–5.9)
SODIUM SERPL-SCNC: 134 MMOL/L (ref 133–144)
WBC # BLD AUTO: 15.1 10E3/UL (ref 4–11)

## 2021-07-29 PROCEDURE — 87340 HEPATITIS B SURFACE AG IA: CPT | Performed by: STUDENT IN AN ORGANIZED HEALTH CARE EDUCATION/TRAINING PROGRAM

## 2021-07-29 PROCEDURE — 250N000011 HC RX IP 250 OP 636: Performed by: PHYSICIAN ASSISTANT

## 2021-07-29 PROCEDURE — 85610 PROTHROMBIN TIME: CPT

## 2021-07-29 PROCEDURE — 250N000011 HC RX IP 250 OP 636: Performed by: INTERNAL MEDICINE

## 2021-07-29 PROCEDURE — 86803 HEPATITIS C AB TEST: CPT | Performed by: STUDENT IN AN ORGANIZED HEALTH CARE EDUCATION/TRAINING PROGRAM

## 2021-07-29 PROCEDURE — 86704 HEP B CORE ANTIBODY TOTAL: CPT | Performed by: STUDENT IN AN ORGANIZED HEALTH CARE EDUCATION/TRAINING PROGRAM

## 2021-07-29 PROCEDURE — 99233 SBSQ HOSP IP/OBS HIGH 50: CPT | Mod: GC | Performed by: STUDENT IN AN ORGANIZED HEALTH CARE EDUCATION/TRAINING PROGRAM

## 2021-07-29 PROCEDURE — 250N000013 HC RX MED GY IP 250 OP 250 PS 637: Performed by: INTERNAL MEDICINE

## 2021-07-29 PROCEDURE — 90937 HEMODIALYSIS REPEATED EVAL: CPT

## 2021-07-29 PROCEDURE — 250N000009 HC RX 250: Performed by: INTERNAL MEDICINE

## 2021-07-29 PROCEDURE — 634N000001 HC RX 634: Performed by: CLINICAL NURSE SPECIALIST

## 2021-07-29 PROCEDURE — 99233 SBSQ HOSP IP/OBS HIGH 50: CPT | Mod: 24 | Performed by: INTERNAL MEDICINE

## 2021-07-29 PROCEDURE — 99232 SBSQ HOSP IP/OBS MODERATE 35: CPT | Mod: 24 | Performed by: INTERNAL MEDICINE

## 2021-07-29 PROCEDURE — 250N000011 HC RX IP 250 OP 636: Performed by: HOSPITALIST

## 2021-07-29 PROCEDURE — 85027 COMPLETE CBC AUTOMATED: CPT | Performed by: INTERNAL MEDICINE

## 2021-07-29 PROCEDURE — 36592 COLLECT BLOOD FROM PICC: CPT

## 2021-07-29 PROCEDURE — 258N000003 HC RX IP 258 OP 636: Performed by: CLINICAL NURSE SPECIALIST

## 2021-07-29 PROCEDURE — 250N000013 HC RX MED GY IP 250 OP 250 PS 637: Performed by: STUDENT IN AN ORGANIZED HEALTH CARE EDUCATION/TRAINING PROGRAM

## 2021-07-29 PROCEDURE — 80053 COMPREHEN METABOLIC PANEL: CPT | Performed by: INTERNAL MEDICINE

## 2021-07-29 PROCEDURE — 120N000002 HC R&B MED SURG/OB UMMC

## 2021-07-29 PROCEDURE — 86706 HEP B SURFACE ANTIBODY: CPT | Performed by: STUDENT IN AN ORGANIZED HEALTH CARE EDUCATION/TRAINING PROGRAM

## 2021-07-29 PROCEDURE — 87389 HIV-1 AG W/HIV-1&-2 AB AG IA: CPT | Performed by: STUDENT IN AN ORGANIZED HEALTH CARE EDUCATION/TRAINING PROGRAM

## 2021-07-29 RX ORDER — SODIUM BICARBONATE 325 MG/1
325 TABLET ORAL
Status: DISCONTINUED | OUTPATIENT
Start: 2021-07-29 | End: 2021-07-31

## 2021-07-29 RX ADMIN — MELATONIN TAB 3 MG 6 MG: 3 TAB at 22:28

## 2021-07-29 RX ADMIN — Medication 5 ML: at 06:56

## 2021-07-29 RX ADMIN — MIDODRINE HYDROCHLORIDE 10 MG: 5 TABLET ORAL at 08:11

## 2021-07-29 RX ADMIN — SODIUM BICARBONATE 325 MG: 325 TABLET ORAL at 18:47

## 2021-07-29 RX ADMIN — EPOETIN ALFA-EPBX 5000 UNITS: 10000 INJECTION, SOLUTION INTRAVENOUS; SUBCUTANEOUS at 10:58

## 2021-07-29 RX ADMIN — Medication 550 MG: at 06:56

## 2021-07-29 RX ADMIN — Medication: at 08:46

## 2021-07-29 RX ADMIN — LACTULOSE 20 G: 10 SOLUTION ORAL; RECTAL at 20:39

## 2021-07-29 RX ADMIN — HYDROMORPHONE HYDROCHLORIDE 2 MG: 1 SOLUTION ORAL at 22:16

## 2021-07-29 RX ADMIN — HYDROMORPHONE HYDROCHLORIDE 2 MG: 1 SOLUTION ORAL at 15:08

## 2021-07-29 RX ADMIN — LACTULOSE 20 G: 10 SOLUTION ORAL; RECTAL at 15:03

## 2021-07-29 RX ADMIN — LACTULOSE 20 G: 10 SOLUTION ORAL; RECTAL at 07:46

## 2021-07-29 RX ADMIN — LINEZOLID 600 MG: 600 INJECTION, SOLUTION INTRAVENOUS at 17:47

## 2021-07-29 RX ADMIN — PIPERACILLIN SODIUM AND TAZOBACTAM SODIUM 2.25 G: 2; .25 INJECTION, POWDER, LYOPHILIZED, FOR SOLUTION INTRAVENOUS at 03:21

## 2021-07-29 RX ADMIN — Medication 40 MG: at 06:55

## 2021-07-29 RX ADMIN — THIAMINE HYDROCHLORIDE 100 MG: 100 INJECTION, SOLUTION INTRAMUSCULAR; INTRAVENOUS at 07:02

## 2021-07-29 RX ADMIN — SODIUM CHLORIDE 250 ML: 9 INJECTION, SOLUTION INTRAVENOUS at 08:46

## 2021-07-29 RX ADMIN — PIPERACILLIN SODIUM AND TAZOBACTAM SODIUM 2.25 G: 2; .25 INJECTION, POWDER, LYOPHILIZED, FOR SOLUTION INTRAVENOUS at 15:03

## 2021-07-29 RX ADMIN — SODIUM CHLORIDE 300 ML: 9 INJECTION, SOLUTION INTRAVENOUS at 08:46

## 2021-07-29 RX ADMIN — PIPERACILLIN SODIUM AND TAZOBACTAM SODIUM 2.25 G: 2; .25 INJECTION, POWDER, LYOPHILIZED, FOR SOLUTION INTRAVENOUS at 16:16

## 2021-07-29 RX ADMIN — LINEZOLID 600 MG: 600 INJECTION, SOLUTION INTRAVENOUS at 04:35

## 2021-07-29 RX ADMIN — HYDROMORPHONE HYDROCHLORIDE 2 MG: 1 SOLUTION ORAL at 03:20

## 2021-07-29 RX ADMIN — Medication 550 MG: at 20:39

## 2021-07-29 RX ADMIN — HYDROMORPHONE HYDROCHLORIDE 2 MG: 1 SOLUTION ORAL at 18:36

## 2021-07-29 RX ADMIN — PANCRELIPASE 2 CAPSULE: 24000; 76000; 120000 CAPSULE, DELAYED RELEASE PELLETS ORAL at 18:48

## 2021-07-29 RX ADMIN — FOLIC ACID 1 MG: 1 TABLET ORAL at 07:44

## 2021-07-29 ASSESSMENT — ACTIVITIES OF DAILY LIVING (ADL)
ADLS_ACUITY_SCORE: 20

## 2021-07-29 ASSESSMENT — MIFFLIN-ST. JEOR
SCORE: 1637.75
SCORE: 1652.75

## 2021-07-29 NOTE — PLAN OF CARE
Late-entry  Care from 1 pm to 3 pm  Pt arrived to  direct from dialysis at 1 pm as a transfer from [facility]. Alert; but pt sleepy and flat affect. AVSS, sats mid 90's on 3 LPM NC.  Pt did not receive his 10 am zosyn dose as pt pt was in dialysis; dose was given at 3 pm.   Pt reported generalized pain; PRN dialudid 2 mg solution given via J tube.G tube to gravity with 500 ml greenish output.  Pt's belongings transferred from [facility] to .  Pt declined 2 RN skin assessment stating that he feels cold.Report is given to oncoming RN.  Continue with POC.

## 2021-07-29 NOTE — PROGRESS NOTES
Pt just arrived direct from dialysis ; pt is a [facility] transfer.  Pt had 1.5 liters off per dialysis RN. Alert ; but flat affect. Sleepy.

## 2021-07-29 NOTE — PROVIDER NOTIFICATION
Time of notification: 8:08 PM  Provider notified: PT had a new JG tube put in today, are we okay to use it for meds. Also patient did not have tube feedings today, should the tube feedings be started for tonight?    Patient status: Resting in bed.  Temp:  [97.4  F (36.3  C)-98.2  F (36.8  C)] 97.9  F (36.6  C)  Pulse:  [101-115] 103  Resp:  [13-26] 20  BP: (111-123)/(54-72) 117/72  FiO2 (%):  [3 %-35 %] 35 %  SpO2:  [92 %-99 %] 92 %  Orders received: Clear Liquid Diet, Okay to use JG Tube, Do Tube Feedings Tonight.

## 2021-07-29 NOTE — PROGRESS NOTES
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 Per RN no skin issues noted during cleaning, area was almost healing last week. Frequency of loose stool has been decreased. Recommended to continue with incontinence protocol. WOC will sign off. Re- consult with new issue    Treatment Plan  Perineal cares:    -cleanse with Samson Cleanse and Protect All-in-One Protectant Lotion (this is an all in one cleanser, moisturizer, and barrier ointment).  -Apply THIN layer of Critic-Aid Thick Moisture Barrier Paste to all skin that is in contact with stool or urine.  -With repeat cleansings, DO NOT scrub Critic-Aid down to skin, just gently remove surface incontinence soil and reapply additional Critic Aid, if needed.  -If complete removal of Critic-Aid is required, use a warm wash cloth and Samson cleanser: place the warm wash cloth to the area for a minute and then cleanse, or use mineral oil/baby oil and soft disposable wash cloth.  .    Avoid brief or pull-up in bed, use only bed underpad.      Orders Reviewed  WO Nurse follow-up plan:signing off  Nursing to notify the Provider(s) and re-consult the WO Nurse if wound(s) deteriorates or new skin concern.    Patient History  According to provider note(s):  30 year old male with PMHx significant for alcohol use disorder who has had a prolonged hospitalization at [facility] in [place] for acute alcohol hepatitis/end stage liver disease complicated by hepatic encephalopathy, acute renal failure requiring hemodialysis, acute hypoxemic respiratory failure and septic shock secondary to acute necrotizing pancreatitis. Transferred to [facility] ICU on 2021 for further management of necrotizing pancreatitis by interventional GI, possible  necrosectomy.    Objective Data  Containment of urine/stool: Incontinence Protocol and Incontinent pad in bed    Active Diet Order  Orders Placed This Encounter      Regular Diet Adult    Output:   I/O last 3 completed shifts:  In: 1100 [I.V.:400; Other:130; NG/GT:290]  Out: 615 [Urine:50; Emesis/NG output:50; Drains:515]    Risk Assessment:   Sensory Perception: 3-->slightly limited  Moisture: 3-->occasionally moist  Activity: 1-->bedfast  Mobility: 2-->very limited  Nutrition: 3-->adequate  Friction and Shear: 2-->potential problem  Tyrel Score: 14                          Labs:   Recent Labs   Lab 21  0537   ALBUMIN 1.8*   HGB 8.6*   INR 1.86*   WBC 15.1*       Physical Exam  Areas of skin assessed: focused buttocks     Wound Location:  Base of dennis cleft, inferior to coccyx    Date of last photo 21(photo didn't save )  Wound History: present on admission.  Pt has been receiving lactulose, previously with rectal tube with balloon.    Wound Base: 100 % dermis     Palpation of the wound bed: normal      Drainage: none     Description of drainage: none     Measurements (length x width x depth, in cm) 0.5  x 0.1  x  <0.1 cm almost resurfaced   Periwound skin: erythema- blanchable      Temperature: normal   Odor: none  Pain: no grimacing or signs of discomfort,     Interventions  Visual inspection and assessment completed   Wound Care Rationale Provide protection   Wound Care: done per plan of care  Supplies: gathered  Current off-loading measures: Pillows  Current support surface: Standard  Low air loss mattress with pulsation   Discussed plan of care with Nurse    Kathy Marsh RN CWOCN

## 2021-07-29 NOTE — PROGRESS NOTES
Nephrology Progress Note  07/29/2021         Dax Villareal is a 30 year old male with h/o ETOH hepatitis, end stage liver disease, RADHA on HD, transferred from [facility] in [place] for septic shock on 6/28/21 for treatment of necrotizing pancreatitis and decompensated liver disease. He was initially admitted to [facility] 5/19/21.        Interval History :   Mr Villareal had GI stenting of pancreas again yesterday in OR, seen on run this am with plan to pull 1-2L of UF.  Still with significant abdominal pain due to pancreatitis, we do not anticipate renal recovery as we are ~2 months into requiring RRT and he still has significant issues keeping him in the hospital although his bili has come down to ~4 and his BP's are reasonable on and off of runs.          Assessment & Recommendations:   RADHA-Cr was 0.8 as recently as 5/19/2021 but now HD dependent, started RRT at [facility] prior to transfer to [facility] (exact date unclear but per family it was end of May).  Etiology likely multifactorial with contrast, sepsis/pancreatitis, likely HRS physiology contributing as well.  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  Continuing RRT as long as it is tolerated with regards to BP's, is functional enough to possibly do outpt HD.  MELD is in high 30's so has high 3 month mortality but continuing restorative cares for now.  Had GI stenting of pancreas on 7/21 and 7/28.                 -Line is TDC from PTA               -HD today, 1-2L of UF     Volume status-Anuric, pulling 1-2L of UF as BP's tolerate today.  No major edema on exam although tends to accumulate fluid as pleural effusion rather than ascites.        Electrolytes/pH-K 3.3 and Na 134 today, on fluid restriction for hyponatremia.  Improved after short run yesterday.        Ca/phos/pth-Ca 8.8, corrects to normal with low albumin.       Anemia-Hgb 8.6, acute management per team, last PRBC's 7/14.       Nutrition-Taking PO, appetite ok.       Discussed with  "Dr Dow      Recommendations were communicated to primary team via verbal communication.        SOIFA Marlow CNS  Clinical Nurse Specialist  [phone]    Review of Systems:   I reviewed the following systems:  Gen: No fevers or chills  CV: No CP at rest  Resp: No SOB at rest  GI: No N/V    Physical Exam:   I/O last 3 completed shifts:  In: 1100 [I.V.:400; Other:130; NG/GT:290]  Out: 615 [Urine:50; Emesis/NG output:50; Drains:515]   /65 (BP Location: Right arm)   Pulse 98   Temp 97.7  F (36.5  C) (Oral)   Resp 16   Ht 1.676 m (5' 5.98\")   Wt 74 kg (163 lb 2.3 oz)   SpO2 98%   BMI 26.34 kg/m       GENERAL APPEARANCE: Mild distress, + abd pain.    EYES:  scleral icterus, pupils equal  Pulmonary: lungs clear to auscultation. Off supplemental oxygen   CV: tachy   - Edema - no LE edema  GI: large distended, tender. Has retroperitoneal drain  MS: no evidence of inflammation in joints, no muscle tenderness  SKIN: no rash, warm, dry, no cyanosis  NEURO: alert/interactive  ACCESS: Right TDC    Labs:   All labs reviewed by me  Electrolytes/Renal - Recent Labs   Lab Test 07/29/21  1320 07/29/21  0537 07/28/21  1925 07/28/21  0355 07/27/21  0718 07/27/21  0718 07/26/21  0601 07/25/21  0553 07/24/21  0700 07/15/21  0811 07/15/21  0443 07/14/21  0345 07/14/21  0345 07/13/21  0718   NA  --  134  --  135  --  133 134 132* 130*   < > 131*  --  130* 131*   POTASSIUM  --  3.3*  --  3.2*  --  3.5 3.5 3.7 3.9   < > 3.3*  --  4.2 3.6   CHLORIDE  --  100  --  102  --  98 100 99 98   < > 98  --  98 98   CO2  --  21  --  25  --  23 24 24 23   < > 23  --  20 23   BUN  --  40*  --  27  --  39* 28 20 32*   < > 26  --  42* 29   CR  --  3.79*  --  3.02*  --  3.96* 3.16* 2.32* 3.32*   < > 2.30*  --  3.35* 2.60*   GLC 87 152* 107* 98   < > 114* 79 90 77   < > 131*   < > 137* 94   JANENE  --  8.8  --  8.4*  --  8.9 8.7 8.1* 8.6   < > 8.1*  --  8.6 8.5   MAG  --   --   --   --   --   --   --   --   --   --  1.6  --  1.6 1.5* "   PHOS  --   --   --   --   --   --  5.2* 3.7 5.8*   < > 3.0  --  4.6* 4.3    < > = values in this interval not displayed.       CBC -   Recent Labs   Lab Test 07/29/21  0537 07/28/21  0355 07/27/21  0718   WBC 15.1* 13.4* 14.5*   HGB 8.6* 8.3* 8.4*    171 188       LFTs -   Recent Labs   Lab Test 07/29/21  0537 07/28/21  1038 07/28/21  0355 07/27/21  0718   ALKPHOS 133  --  133 138   BILITOTAL 3.8*  --  4.1* 4.6*   ALT 12  --  9 8   AST 33  --  27 27   PROTTOTAL 7.1 6.9 7.1 7.1   ALBUMIN 1.8*  --  1.9* 1.9*       Iron Panel -   Recent Labs   Lab Test 07/19/21  0632   IRON 31*   IRONSAT 43           Current Medications:    amylase-lipase-protease  2 capsule Oral TID w/meals     B and C vitamin Complex with folic acid  5 mL Per Feeding Tube Daily     folic acid  1 mg Oral Daily    Or     folic acid  1 mg Intravenous Daily     lactulose  20 g Oral or NG Tube 4x Daily     linezolid  600 mg Intravenous Q12H     melatonin  6 mg Oral At Bedtime     pantoprazole  40 mg Oral QAM AC     piperacillin-tazobactam  2.25 g Intravenous Q6H     rifaximin  550 mg Oral BID     sodium chloride (PF)  100 mL Irrigation Q4H LENKA     sodium chloride (PF)  3 mL Intracatheter Q8H     sodium chloride (PF)  3 mL Intracatheter Q8H     vitamin B1  100 mg Oral Daily    Or     thiamine  100 mg Intravenous Daily       - MEDICATION INSTRUCTIONS -       dextrose         Attestation:  This patient has been seen, examined and evaluated by me, Yu Dow MD as a shared visit with the NP/PA above.     In brief:  Dax Villareal is a 30 year old male with ESLD, RADHA and acute pancreatitis complicated by a large right pleural effusion. On RRT since the end of May. Yesterday had ERCP necrosectomy and thoracentesis     Physical exam, vital signs I and O and labs reviewed     Abd is distended and tender to palpation.  2 plus edema.      Bili 3.8 alb 1.      Assessment:  # ESLD: not a transplant candidate  # RADHA: Do not expect renal recovery  #  pancreatitis: continued abd pain.  Drain replaced  # pleural effusion: S/P tap and fluid is exudative, likely extension from abd cavity     Plan:   HD today     Yu Dow MD      [phone]

## 2021-07-29 NOTE — PROGRESS NOTES
CLINICAL NUTRITION SERVICES - REASSESSMENT NOTE     Nutrition Prescription    RECOMMENDATIONS FOR MDs/PROVIDERS TO ORDER:  None at this time     Malnutrition Status:    Severe malnutrition in the context of acute on chronic illness     Recommendations already ordered by Registered Dietitian (RD):  Increasing enteral nutrition provisions due to oral intake declining:   Vital 1.5 Leno @ goal of  75ml/hr x 16 hours(4pm-8am) + 4 packets Prosource  (1200ml/day)  will provide: 1960 kcals (29 kcal/kg), 125 g PRO (1.8 g/kg), 916 ml free H20, 224 g CHO, 7 g fiber and 69 g fat daily.    Adjusted Creon orders, 2 capsules q 4 hours while TF is running provides 2793 mL lipase/g fat     Future/Additional Recommendations:  Monitor tolerance of enteral nutrition increased provisions and ability to tolerate oral intake     EVALUATION OF THE PROGRESS TOWARD GOALS   Diet: Regular (7/29) + 1200 mL fluid restriction   NPO/Clear liquid (7/28), Regular (7/22-7/28)    Intake: no intake percentages documented x6 days.   Per [facility], last meal ordered 7/25. Dinner: Pot roast, gravy, milk, roll (471 kcal and 34g protein)    Nutrition Support: Vital 1.5 @ 70 mL/hr x6 hours (12pm-6pm) = 420 mL, 630 kcal (9 kcal/kg), 28 g protien, 320 mL free water, 45 g CHO, 24 g fat   Access: G-J tube   FWF: 30 ml q 4 hours   Enzymes:    Intake: Average enteral nutrition received 7/22-7/28 provided 236 mL/day, 354 kcal and 16 g protein      NEW FINDINGS   Weight Trends:  07/29/21 0539 75.5 kg (166 lb 7.2 oz)   07/28/21 0009 75.5 kg (166 lb 7.2 oz)   07/27/21 1108 76 kg (167 lb 8.8 oz)   07/26/21 0500 79.5 kg (175 lb 4.3 oz)   07/25/21 0707 81.2 kg (179 lb 0.2 oz)   07/25/21 0600 80.5 kg (177 lb 7.5 oz)   07/24/21 0614 79.5 kg (175 lb 4.3 oz)   07/22/21 0900 78 kg (171 lb 15.3 oz)   07/08/21 0400 67.5 kg (148 lb 13 oz)    06/28/21 1815 80.2 kg (176 lb 12.9 oz) - admission      Dosing weight 67.5 kg (lowest weight)   Estimated Energy Needs: 2025-2362  kcals/day (30- 35+ kcals/kg)   Justification: hypermetabolism with pancreatitis/HD and liver disease   Estimated Protein Needs: 101-135 g/day (1.5 - 2 g/kg)   Justification: pancreatitis and Hypercatabolism with critical illness   Estimated Fluid Needs: Per provider pending fluid status     GI: OR yesterday for upper GI endoscopy. GJ tube exchanged. 1-5 stool occurrences daily the past week.  Skin: No new issues. WOCN consulted for buttock wounds   Labs: K+ 3.3 (L), Urea Nitrogen 40 (H), Creatinine 3.79 (H), Glucose 152 (H)  Medications:   Creon 24 2 capsules with meals (held or not given since 7/24 due to refusal or not eating)  Nephronex  Folic acid   Lactulose  Protonix  Thiamine    MALNUTRITION  % Intake: </= 50% for >/= 5 days (severe)  % Weight Loss: > 5% in 1 month (severe)   Subcutaneous Fat Loss: Facial region:  moderate  Muscle Loss: Temporal: Severe, Scapular bone: moderate, Thoracic region (clavicle, acromium bone, deltoid, trapezius, pectoral): moderate, Upper arm (bicep, tricep): moderate, Dorsal hand: moderate, Upper leg (quadricep, hamstring): moderate and Posterior calf: moderate,  Fluid Accumulation/Edema: None noted  Malnutrition Diagnosis: Severe malnutrition in the context of acute on chronic illness    Previous Goals    Total avg nutritional intake to meet a minimum of 25 kcal/kg and 1.3 g PRO/kg daily (per dosing wt 69.5 kg).  Evaluation: Not met    Previous Nutrition Diagnosis  Inadequate oral intake related to decreased appetite, inability to take adequate PO and increased metabolic demand as evidenced by intakes 50% or less with multiple days of no intakes documented, AMS 2/2 liver disease with placement of  PEG-J for nutrition support and increased energy/protein demand 2/2 liver disease and dialysis  Evaluation: Declining    CURRENT NUTRITION DIAGNOSIS  Inadequate protein-energy intake related to inadequate oral intake and inadequate nutrition support as evidenced by no meals ordered per  [facility] since 7/25, no oral intakes documented and patients prior refusal to nutrition support for >6 hours     INTERVENTIONS  Implementation  Collaboration with other providers  Enteral Nutrition -see above     Goals  Total avg nutritional intake to meet a minimum of 25 kcal/kg and 1.5 g PRO/kg daily (per dosing wt 67.5 kg).    Monitoring/Evaluation  Progress toward goals will be monitored and evaluated per protocol.    Jennifer Castillo RD, MARYELLEN  6B pager: [phone]

## 2021-07-29 NOTE — CONSULTS
General ID Service: Follow-up Note      Patient:  Dax Villareal, Date of birth 1990, Medical record number [id number]  Date of Visit:  July 29, 2021         Assessment and Recommendations:     ID PROBLEM LIST:  1. Pleural effusion  2. Acute necrotizing pancreatitis s/p cystgastrostomy stenting with necrosectomy 7/21  3. RADHA requiring iHD  4. Decompensated ESLD c/b HE  5. Alcoholic hepatitis  6. Ascites     RECOMMENDATIONS:   1. Reasonable to continue empiric broad spectrum antibiotics while fluid cultures pending  2. Follow up fluid cultures   3. Defer decision of further drainage/chest tube to primary team and pulmonary        ASSESSMENT:  Dax Villareal is a 30 year old male with PMHx significant for alcohol use disorder who was admitted to [facility] in [place], [place] for abdominal pain, nausea and vomiting, found to have end stage liver disease c/b hepatic encephalopathy, acute renal failure requiring iHD, SBP and acute necrotizing pancreatitis. ID last signed off 7/23 with recs to transition from broad spectrum antibiotics back to po cipro on 7/26; called back because patient has since undergone R thoracentesis on 7/28 with findings consistent with exudative effusion with an LDH and glucose that were overall were not consistent with empyema and the gross fluid appearance was not noted to appear as pus either, though a pH was not sent. He has had intermittently elevated lactate and so primary team has patient on empiric linezolid and zosyn currently. He otherwise remains afebrile, hemodynamically stable, and WBC continues slowly down-trending. Patient remains on 2L nasal cannula. Repeat CXR yesterday (after thora) showing continued moderate sized R pleural effusion. Suspect the exudative effusion is due to combination of hepatic hydrothorax and pancreatitis, though given his clinical status it would be reasonable to continue empiric antibiotics while fluid cultures are pending.       Thank you for  allowing us to participate in the care of this patient. ID will continue to follow.    Attestation:  Gaurav Smallwood MD  Infectious Diseases     07/29/2021            Interval History:     ID last signed off 7/23 with recs to transition from broad spectrum antibiotics back to po cipro on 7/26; called back because patient has since undergone R thoracentesis on 7/28. He otherwise remains afebrile, hemodynamically stable.     Patient currently denies fevers, dyspnea, cough, vomiting, or diarrhea. He does report continued diffuse abdominal discomfort.         Review of Systems:   Full 4 point ROS obtained, pertinent positives and negatives as above.          Current Antimicrobials   Linezolid, zosyn         Physical Exam:   Ranges for vital signs:  Temp:  [97.5  F (36.4  C)-98.3  F (36.8  C)] 97.5  F (36.4  C)  Pulse:  [] 97  Resp:  [11-26] 16  BP: (100-119)/(47-78) 106/56  FiO2 (%):  [35 %] 35 %  SpO2:  [91 %-99 %] 95 %    Intake/Output Summary (Last 24 hours) at 7/29/2021 1609  Last data filed at 7/29/2021 1146  Gross per 24 hour   Intake 490 ml   Output 2390 ml   Net -1900 ml     Exam:  GENERAL:  Chronically ill-appearing male lying in bed.   ENT:  Head is normocephalic, atraumatic. Oropharynx is dry.  EYES:  Eyes have icteric sclerae.   LUNGS:  Unlabored breathing. Nasal cannula in place. Decreased breath sounds at R base.  CARDIOVASCULAR:  Regular rate.  ABDOMEN:  Distended, soft, mildly tender to palpation diffusely, +g-tube.  EXT: +edema.  SKIN:  +jaundiced.  NEUROLOGIC:  Asleep but arousable, responds to questions appropriately.         Laboratory Data:     Inflammatory Markers    Recent Labs   Lab Test 10/06/20  0927   CRP 16.9*     Hematology Studies    Recent Labs   Lab Test 07/29/21  0537 07/28/21  0355 07/27/21  0718 07/26/21  0601 07/25/21  0553 07/24/21  0700 06/29/21  0410 06/28/21  1839 05/18/21  1220 12/09/20  1504 10/06/20  0927   WBC 15.1* 13.4* 14.5* 16.4* 24.5* 24.3*   < > 50.6* 13.2* 4.2  6.9   ANEU  --   --   --   --   --   --   --  44.0* 12.0* 2.1 5.2   AEOS  --   --   --   --   --   --   --  0.5 0.0 0.2 0.1   HGB 8.6* 8.3* 8.4* 8.5* 8.3* 8.7*   < > 7.6* 16.6 17.2 18.8*   MCV 92 93 95 95 94 94   < > 101* 98 93 89    171 188 219 208 240   < > 127* 222 299 311    < > = values in this interval not displayed.       Metabolic Studies     Recent Labs   Lab Test 07/29/21  0537 07/28/21  0355 07/27/21  0718 07/26/21  0601 07/25/21  0553    135 133 134 132*   POTASSIUM 3.3* 3.2* 3.5 3.5 3.7   CHLORIDE 100 102 98 100 99   CO2 21 25 23 24 24   BUN 40* 27 39* 28 20   CR 3.79* 3.02* 3.96* 3.16* 2.32*   GFRESTIMATED 20* 26* 19* 25* 36*       Hepatic Studies    Recent Labs   Lab Test 07/29/21  0537 07/28/21  0355 07/27/21  0718 07/26/21  0601 07/25/21  0553 07/24/21  0700   BILITOTAL 3.8* 4.1* 4.6* 5.1* 5.1* 5.6*   ALKPHOS 133 133 138 147 169* 144   ALBUMIN 1.8* 1.9* 1.9* 2.2* 2.3* 2.4*   AST 33 27 27 32 38 36   ALT 12 9 8 11 12 10       Microbiology:  Culture   Date Value Ref Range Status   07/28/2021 No growth, less than 1 day  Preliminary   07/27/2021 No growth after 1 day  Preliminary   07/27/2021 No growth after 1 day  Preliminary   07/26/2021 No growth after 2 days  Preliminary   07/26/2021 No growth after 2 days  Preliminary   07/21/2021 No Growth  Final   07/19/2021 2+ Candida albicans (A)  Final     Comment:     Susceptibilities not routinely done   07/19/2021 2+ Enterococcus faecium (A)  Final   07/16/2021 4+ Candida albicans (A)  Final     Comment:     Susceptibilities not routinely done   07/16/2021 1+ Normal fausto  Final   07/16/2021 No Growth  Final   07/16/2021 No Growth  Final   07/16/2021 No Growth  Final   07/14/2021 Culture in progress  Final   07/14/2021 4+ Enterococcus faecium VRE (A)  Final     Comment:     Preliminary result, confirmation pending.   07/14/2021 4+ Enterococcus faecium VRE (A)  Final     Comment:     Strain 2   07/14/2021 No anaerobic organisms isolated  Final    2021 No Growth  Final       Urine Studies    Recent Labs   Lab Test 20  1817 10/06/20  1214   LEUKEST Negative Negative   WBCU 0 1              Imagin/28 CXR:  FINDINGS: Decreased right pleural effusion. No definitive  pneumothorax. Right IJ catheter tip the distal SVC. Left PICC tip at  the distal SVC. Right heart border partially obscured but the heart  does not appear grossly enlarged.

## 2021-07-29 NOTE — PROGRESS NOTES
HEMODIALYSIS TREATMENT NOTE    Date: 7/29/2021  Time: 11:02 AM    Data:  Pre Wt: 75.5 kg (166 lb 7.2 oz)   Desired Wt: 74.5 kg   Post Wt: 74 kg (163 lb 2.3 oz)  Weight change: 1.5 kg  Ultrafiltration - Post Run Net Total Removed (mL): 1500 mL  Vascular Access Status: CVC  patent  Dialyzer Rinse: Clear  Total Blood Volume Processed: 65.52 L   Total Dialysis (Treatment) Time: 3   Dialysate Bath: K 4, Ca 3  Heparin: None    Lab:   No    Interventions:  Pt had a stable uncomplicated 3 hours of HD. 1.5L of fluid was pulled per order. Epogen administered as prescribed. Post tx, pt had a lg loose BM, and was cleansed with ROB Harper. Ending BP was 105/69. Pt rinsed back post tx, lumen were saline locked, caps changed, and hand off report given to ROB Ling    Assessment:  -Calm & Cooperative  -VSS  -A & O X 4     Plan:    -Per renal  -Pt transferred to

## 2021-07-29 NOTE — PLAN OF CARE
Plan of Care [id number]  Shift Notes:      Neuro:    - A&Ox4    Cardiac:    - Rhythm: ST   - VSS.     Respiratory:    - SPO2 > 90% on 3L.    /GI:   - This Shift    Diet/appetite:    - Tolerating clear liquid diet.    Activity:     - Up with Lift.   - Bed Alarm Active.    Pain:    - At acceptable level on current regimen.     Skin:    - No new skin issues.    LDA's:   - CVC   - 3 Lumen Picc   - Abdominal Drain   - JG Tube    Plan:    - Continue with POC.    - Notify primary team with changes.

## 2021-07-29 NOTE — PLAN OF CARE
Med zosyn 2.25g given at 15:00 by previous nurse, but was also ordered still to be given at 16:00. Efrem valenzuela RN gave pt the dose ordered for 16:00 since there was confusion in report about zosyn being given and Epic did not have any alarm generate stating it was too soon for the zosyn to be given. Pharm called by writer to discuss if there were any major concerns, the rec next dose of zosyn be pushed back to 02:00, order changed to follow rec. Team paged of the incident and compass was filed  Discussed with pts AM bedside RN what had happened around 18:00 (RN Anuradha had floated to 6C for her 15:00 to 19:00 shift). Anuradha explained that she had an extensive report and the oncoming RN may have not heard Anuradha state that the zosyn had been given. Anuradha acknowledged that she could have cancelled the 16:00 dose, but she was trying to play catch up and finish as many things as she could at the end of her shift before heading to 6C. Oncoming nurse did mention she didn't notice in Epic that zosyn had been given at 15:00, but it would have been helpful if an alarm generated stating that the zosyn was being given too close to previous dose.

## 2021-07-29 NOTE — PLAN OF CARE
Neuro: A&Ox4. Forgetful at base line.  Calls as needed.  Cardiac: -120.   VSS.   Respiratory: Sating 94%>on 3 LPM via NC  GI/: Pt did urinate 50 ml  tonight. Pt does HD through right CVC line. One loose stool today.  Diet/appetite: Tolerating NPO diet.   Activity:  Assist of 2 to repo and turn in bed.  Pain: At acceptable level on current regimen.   Skin: No new deficits noted.  LDA's:PICC, CVC, left tube for drain pancreatic drainage, G tube to gravity, J tube clamped.    Plan: Continue with POC. Notify primary team with changes.

## 2021-07-29 NOTE — PROGRESS NOTES
Resident/Fellow Attestation   I, Cathryn Zuniga, was present with the medical/NAHUM student who participated in the service and in the documentation of the note.  I have verified the history and personally performed the physical exam and medical decision making.  I agree with the assessment and plan of care as documented in the note.      Cathryn Zuniga MD  PGY1  Date of Service (when I saw the patient): 07/29/21    [facility] [place]    Progress Note - Maroon 3 Service        Date of Admission:  6/28/2021    Assessment & Plan   Dax Villareal is a 30 year old male with hx of alcohol use disorder admitted on 6/28/2021 after recent prolonged admission at [facility] in [place] who has severe acute alcoholic hepatitis c/b HE, ESRD requiring HD, and malnutrition in the setting of acute necrotizing pancreatitis, s/p PEG-J placement on 7/13. He arrived with acute respiratory failure and septic shock that have resolved, and his secondary bacterial peritonitis has been appropriately treated. He requires continued management for ESRD, hepatic encephalopathy, infected pancreatic pseudocyst, nutritional support via PEG-J.     Today:  - Dialysis today, 1.5 L fluid removed and epo given  - Nutrition increased TFs with goal of 75 mL/hr x16hrs (4pm-8am)  - Thora: exudative, cultures pending  - Repeat endoscopic necrosectomy pushed, likely next week     # Necrotizing pancreatitis w/ infected pseudocyst s/p RP Drain and Cystogastrostomy, improving  # Septic Shock, resolved   # Bacterial Peritonitis 2/2 infected pseudocyst, resolved  # Leukocytosis, stable  # Peripancreatic fluid collection growing VRE   Etiology of the septic shock that he came in with likely due to necrotizing pancreatitis, infected pseudocyst, and peritonitis. Had percutaneous RP drain placed for drainage of pancreatic collection. Diagnostic para returned with peritonitis. Per discussion with ID, ongoing leukocytosis is not  unexpected with nec panc. CT 7/18 showed unchanged moderate ascites and collection size. Cystgastrostomy on 7/21 with therapeutic para.      CT 7/27 showed pancreatic stent partially in adjacent mesentery and displaced gastrostomy balloon, GI replaced stent on 7/28. CT also showed mild peritoneal thickening, unable to rule out ongoing peritonitis, though patient has clinically been afebrile, no significant changes in abdominal exam.     - Continue Zosyn + Linezolid through endoscopic debridement  - Repeat endoscopic necrosectomy pushed, likely next week  - PRN paracentesis  - Daily CBC  - MAP goal > 65  - GI following, appreciate recs               - Pancreatic enzymes    - For back pain: Tylenol PRN and Dilaudid 2mg q3h PRN  - Blood cultures NGTD     # Right hepatic hydrothorax  7/19 evening, patient developed increasing cough with thick, clear secretions. CXR on 7/19 w/ increased RLL haziness; ddx includes pleural effusion v pneumonia v atelectasis. Pt had ongoing increased WOB with use of accessory muscles, diminished lung sounds. Repeat CXR on 7/24 with increasing effusion. Has been refusing thoracentesis last several days.     Despite this, patient looks improved on 7/27 with very minimal increased WOB without accessory muscle use and clearer lung sounds in RLL. CT on 7/28 showed large volume R hepatic hydrothorax with complete atelectasis of right lung. CXR on 7/28 showed improved R pleural effusion. Lung sounds are significantly improved over entire R lung on 7/29.     - IR thoracentesis completed 7/28 - exudative  - Follow up thora labs/cultures  - Consult ID for exudative effusion     # Lactic acidosis, resolved  Patient triggered sepsis protocol 7/26-7/27 and 7/27-7/28 overnight. Lactic of 3.9 on 7/27 evening, improved to 2.7 after 250 mL bolus. Abx were escalated from ceftriaxone to Zosyn. Ddx includes: bacteremia 2/2 infected hepatic hydrothorax/nec panc/peritonitis v hypoperfusion 2/2 dialysis fluid  removal. Patient is afebrile, WBC stable/down trending with no growth in blood clx, and appears about at his baseline clinically, making acute infection less likely. Patient had dialysis on 7/27 and had 1L pulled; after dialysis, pt c/o worsening abd pain and had emesis x3. Lactic acidosis most likely 2/2 fluid removal from dialysis and fluid loss from emesis.  - Continue Zosyn + Linezolid  - close monitoring of fluid status    # Severe malnutrition in setting of chronic illness  PEG-J placed 7/13. Procedure went well. On 7/29, Nutrition increased TFs to goal of 75 mL/hr x16hr (4pm-8am).     - Dietitian consult for new tube feeds, appreciate recs              - Continue TFs at 75 mL/hr x16hr 4pm-8am (though intermittently refusing)               - Cont panc enzymes  - Regular Diet, thin liquids per SLP  - Continue multivitamins  - Pantoprazole 40 mg IV daily  - Zofran PRN     # Alcoholic hepatitis complicated by portal hypertension, ascites, and HE - improving   # Hx of Alcohol use disorder c/b withdrawal   # Anemia  # Coagulopathy   Patient with a history of marked alcohol use and alcoholic hepatitis in the past. Was given a course of steroids (1 week) during previous admission at OSH. Patient mental status has been stable, alert and oriented x4 for a couple weeks. In early-mid July, patient consistently had 1-2 pRBC infusions weekly d/t Hgb drops of 0.3-0.5 every day (etilogy likely chronic liver disease, chronic kidney disease, phlebotomy, dilution, poor nutrition, ect). Hgb stable around 8.3-8.6 for several days.      - Lactulose 20g QID and PRN and rifaximin 550mg BID - as patient allows  - Trend MELD labs  - Daily folic acid and thiamine  - Delirium precautions  - Melatonin 6mg at bedtime  - Daily CBC   - Transfuse if hemoglobin < 7, platelet < 10 or < 30 with bleeding  - Continue to monitor for signs of bleeding     MELD-Na score: 32 at 7/29/2021  5:37 AM  MELD score: 31 at 7/29/2021  5:37 AM  Calculated  from:  Serum Creatinine: 3.79 mg/dL at 7/29/2021  5:37 AM  Serum Sodium: 134 mmol/L at 7/29/2021  5:37 AM  Total Bilirubin: 3.8 mg/dL at 7/29/2021  5:37 AM  INR(ratio): 1.86 at 7/29/2021  5:37 AM  Age: 30 years     # ESRD on HD (MWF)  # Hypervolemia causing acute respiratory failure, resolved  Nephrology note 7/21 indicates that patient has been on renal replacement therapy for two months and pt status has most likely transitioned from ARF to ESRD at this point.          > Hemodialysis as per renal              - Midodrine 10 mg PRN before dialysis sessions     > Daily chemistry labs, strict I/O, and daily weights  - Aspiration precautions  - Supplemental oxygen as needed to keep sats above 89%     # Goals of Care  Please see additional interval note for in depth details regarding conversation on 7/9 about goals of care. Patient's long term prognosis is still quite poor given his multi organ failure. Care conference on 7/15, pt did not wish to participate. Met with sisters Noni (on phone) and Melyssa and close friend Leroy with palliative, nephrology and general medicine teams today to discuss Dax's current improving status, poor prognosis and wishes to continue full cares. Plan to meet Fridays at 4 pm with teams and family for weekly updates.     - Hepatology confirmed pt will not be a transplant candidate  - Daily phone updates to sister Noni ([phone]) - attempted to call today w/ no answer, will try again later this afternoon     Diet: Snacks/Supplements Adult: Ensure Clear; With Meals  Fluid restriction 1200 ML FLUID  Adult Formula Drip Feeding: Specified Time Vital 1.5; Jejunostomy; Goal Rate: 70; mL/hr; From: 12:00 PM; 6:00 PM; Medication - Feeding Tube Flush Frequency: At least 15-30 mL water before and after medication administration and with tube clogging;...  Regular Diet Adult    DVT Prophylaxis: Pneumatic Compression Devices  Rdz Catheter: Not present  Fluids: 10 mL NS TKO  Central Lines:  PRESENT  PICC Triple Lumen 06/28/21 Left-Site Assessment: WDL  CVC Double Lumen Right-Site Assessment: WDL  Code Status: Full Code      Disposition Plan   Expected discharge: >7 days, recommended to transitional care unit once nutrition improved, infected pancreatic pseudocyst is well managed and plan for dialysis is in place.     The patient's care was discussed with the Attending Physician, Dr. Taylor.    Jaime Amin, MS3  Medical Student  [address]  Securely message with the Vocera Web Console (learn more here)  Text page via [employer] Paging/Directory  Please see sign in/sign out for up to date coverage information  _____________________________________________________________________    Interval History   No acute events overnight. Nursing notes reviewed.    Today, patient is very tired but still responds quietly to questions. Same ongoing abdominal and back pain, but no emesis. Decreased work of breathing compared to yesterday, and lung sounds are present over the entire R lung again.    Data reviewed today: I reviewed all medications, new labs and imaging results over the last 24 hours. I personally reviewed the chest x-ray image(s) showing improved R pleural effusion.    Physical Exam   Vital Signs: Temp: 97.6  F (36.4  C) Temp src: Oral BP: 100/52 Pulse: 102   Resp: 18 SpO2: 93 % O2 Device: Nasal cannula Oxygen Delivery: 3 LPM  Weight: 166 lbs 7.16 oz  Gen: Lying down in bed, very sleepy, appears similar to yesterday.   HEENT: Conjunctival icterus  CV: Tachycardic. Regular rhythm.   Resp: Left lung and R upper lobe CTA, moderately diminished lung sounds over RLL. Improved breath sounds over entire R lung compared to yesterday. No wheezing or coughing.  Abd: Refused palpation today, PEG J in place without surrounding erythema. Abdomen less distended than last week.  Skin: Jaundiced.  Ext: Well perfused, no edema  Neuro: Oriented x3, responding  appropriately to questions. No focal deficits. Tremulous.    Data   Recent Labs   Lab 07/29/21  0537 07/28/21  1925 07/28/21  1746 07/28/21  1038 07/28/21  0355 07/27/21  0718   WBC 15.1*  --   --   --  13.4* 14.5*   HGB 8.6*  --   --   --  8.3* 8.4*   MCV 92  --   --   --  93 95     --   --   --  171 188   INR 1.86*  --   --   --  1.86* 1.78*     --   --   --  135 133   POTASSIUM 3.3*  --   --   --  3.2* 3.5   CHLORIDE 100  --   --   --  102 98   CO2 21  --   --   --  25 23   BUN 40*  --   --   --  27 39*   CR 3.79*  --   --   --  3.02* 3.96*   ANIONGAP 13  --   --   --  8 12   JANENE 8.8  --   --   --  8.4* 8.9   * 107* 107*  --  98 114*   ALBUMIN 1.8*  --   --   --  1.9* 1.9*   PROTTOTAL 7.1  --   --  6.9 7.1 7.1   BILITOTAL 3.8*  --   --   --  4.1* 4.6*   ALKPHOS 133  --   --   --  133 138   ALT 12  --   --   --  9 8   AST 33  --   --   --  27 27     Recent Results (from the past 24 hour(s))   POC US Guide for Thorcentesis    Impression    POCUS Lung    7/28/2021      Findings:  Right Sided Large Effusion Pleural      Normal lung sliding at R Lung apex without lung point post procedure.    Gaurav Tabares D.O.  Internal Medicine, Hospitalist  [facility]  Pager: [phone]           XR Chest Port 1 View    Narrative    Portable chest    INDICATION: Status post right thoracentesis    COMPARISON: 7/24/2021    FINDINGS: Decreased right pleural effusion. No definitive  pneumothorax. Right IJ catheter tip the distal SVC. Left PICC tip at  the distal SVC. Right heart border partially obscured but the heart  does not appear grossly enlarged.    CHELLE STARKS MD         SYSTEM ID:  [id number]   XR Surgery CESIA Fluoro L/T 5 Min w Stills    Narrative    This exam was marked as non-reportable because it will not be read by a   radiologist or a [place] non-radiologist provider.

## 2021-07-30 ENCOUNTER — APPOINTMENT (OUTPATIENT)
Dept: GENERAL RADIOLOGY | Facility: CLINIC | Age: 31
End: 2021-07-30
Attending: PHYSICIAN ASSISTANT
Payer: MEDICAID

## 2021-07-30 ENCOUNTER — APPOINTMENT (OUTPATIENT)
Dept: CT IMAGING | Facility: CLINIC | Age: 31
End: 2021-07-30
Attending: INTERNAL MEDICINE
Payer: MEDICAID

## 2021-07-30 LAB
ALBUMIN FLD-MCNC: 1.1 G/DL
ALBUMIN SERPL-MCNC: 1.8 G/DL (ref 3.4–5)
ALP SERPL-CCNC: 129 U/L (ref 40–150)
ALT SERPL W P-5'-P-CCNC: 16 U/L (ref 0–70)
AMYLASE FLD-CCNC: 8 U/L
ANION GAP SERPL CALCULATED.3IONS-SCNC: 10 MMOL/L (ref 3–14)
AST SERPL W P-5'-P-CCNC: 46 U/L (ref 0–45)
BILIRUB SERPL-MCNC: 4.4 MG/DL (ref 0.2–1.3)
BUN SERPL-MCNC: 25 MG/DL (ref 7–30)
CALCIUM SERPL-MCNC: 8.6 MG/DL (ref 8.5–10.1)
CHLORIDE BLD-SCNC: 103 MMOL/L (ref 94–109)
CO2 SERPL-SCNC: 23 MMOL/L (ref 20–32)
CREAT SERPL-MCNC: 2.49 MG/DL (ref 0.66–1.25)
ERYTHROCYTE [DISTWIDTH] IN BLOOD BY AUTOMATED COUNT: 16 % (ref 10–15)
GFR SERPL CREATININE-BSD FRML MDRD: 33 ML/MIN/1.73M2
GLUCOSE BLD-MCNC: 100 MG/DL (ref 70–99)
GLUCOSE FLD-MCNC: 93 MG/DL
HBV CORE AB SERPL QL IA: NONREACTIVE
HBV SURFACE AB SERPL IA-ACNC: 67.22 M[IU]/ML
HBV SURFACE AG SERPL QL IA: NONREACTIVE
HCT VFR BLD AUTO: 26.9 % (ref 40–53)
HCV AB SERPL QL IA: NONREACTIVE
HGB BLD-MCNC: 8.7 G/DL (ref 13.3–17.7)
HIV 1+2 AB+HIV1 P24 AG SERPL QL IA: NONREACTIVE
INR PPP: 1.62 (ref 0.85–1.15)
LACTATE SERPL-SCNC: 4 MMOL/L (ref 0.7–2)
LACTATE SERPL-SCNC: 4.2 MMOL/L (ref 0.7–2)
LACTATE SERPL-SCNC: 4.3 MMOL/L (ref 0.7–2)
MCH RBC QN AUTO: 30 PG (ref 26.5–33)
MCHC RBC AUTO-ENTMCNC: 32.3 G/DL (ref 31.5–36.5)
MCV RBC AUTO: 93 FL (ref 78–100)
PLATELET # BLD AUTO: 148 10E3/UL (ref 150–450)
POTASSIUM BLD-SCNC: 3.1 MMOL/L (ref 3.4–5.3)
PROCALCITONIN SERPL-MCNC: 1.38 NG/ML
PROT FLD-MCNC: 3.6 G/DL
PROT SERPL-MCNC: 7 G/DL (ref 6.8–8.8)
RBC # BLD AUTO: 2.9 10E6/UL (ref 4.4–5.9)
SODIUM SERPL-SCNC: 136 MMOL/L (ref 133–144)
TRIGL FLD-MCNC: 492 MG/DL
WBC # BLD AUTO: 18.2 10E3/UL (ref 4–11)

## 2021-07-30 PROCEDURE — 99232 SBSQ HOSP IP/OBS MODERATE 35: CPT | Mod: 24 | Performed by: INTERNAL MEDICINE

## 2021-07-30 PROCEDURE — 250N000011 HC RX IP 250 OP 636: Performed by: INTERNAL MEDICINE

## 2021-07-30 PROCEDURE — 87075 CULTR BACTERIA EXCEPT BLOOD: CPT | Performed by: INTERNAL MEDICINE

## 2021-07-30 PROCEDURE — 85610 PROTHROMBIN TIME: CPT | Performed by: INTERNAL MEDICINE

## 2021-07-30 PROCEDURE — 93010 ELECTROCARDIOGRAM REPORT: CPT | Performed by: INTERNAL MEDICINE

## 2021-07-30 PROCEDURE — 83605 ASSAY OF LACTIC ACID: CPT | Performed by: INTERNAL MEDICINE

## 2021-07-30 PROCEDURE — 999N000197 HC STATISTIC WOC PT EDUCATION, 0-15 MIN

## 2021-07-30 PROCEDURE — 89051 BODY FLUID CELL COUNT: CPT

## 2021-07-30 PROCEDURE — 80053 COMPREHEN METABOLIC PANEL: CPT | Performed by: INTERNAL MEDICINE

## 2021-07-30 PROCEDURE — 74177 CT ABD & PELVIS W/CONTRAST: CPT

## 2021-07-30 PROCEDURE — P9047 ALBUMIN (HUMAN), 25%, 50ML: HCPCS | Performed by: PHYSICIAN ASSISTANT

## 2021-07-30 PROCEDURE — 71045 X-RAY EXAM CHEST 1 VIEW: CPT | Mod: 26 | Performed by: RADIOLOGY

## 2021-07-30 PROCEDURE — 74177 CT ABD & PELVIS W/CONTRAST: CPT | Mod: 26

## 2021-07-30 PROCEDURE — 250N000013 HC RX MED GY IP 250 OP 250 PS 637: Performed by: INTERNAL MEDICINE

## 2021-07-30 PROCEDURE — 84157 ASSAY OF PROTEIN OTHER: CPT

## 2021-07-30 PROCEDURE — 99233 SBSQ HOSP IP/OBS HIGH 50: CPT | Mod: GC | Performed by: STUDENT IN AN ORGANIZED HEALTH CARE EDUCATION/TRAINING PROGRAM

## 2021-07-30 PROCEDURE — 82150 ASSAY OF AMYLASE: CPT

## 2021-07-30 PROCEDURE — 87205 SMEAR GRAM STAIN: CPT | Performed by: INTERNAL MEDICINE

## 2021-07-30 PROCEDURE — 82945 GLUCOSE OTHER FLUID: CPT

## 2021-07-30 PROCEDURE — 36592 COLLECT BLOOD FROM PICC: CPT | Performed by: INTERNAL MEDICINE

## 2021-07-30 PROCEDURE — 258N000003 HC RX IP 258 OP 636: Performed by: PHYSICIAN ASSISTANT

## 2021-07-30 PROCEDURE — 83605 ASSAY OF LACTIC ACID: CPT | Performed by: NURSE PRACTITIONER

## 2021-07-30 PROCEDURE — 99233 SBSQ HOSP IP/OBS HIGH 50: CPT | Mod: 24 | Performed by: INTERNAL MEDICINE

## 2021-07-30 PROCEDURE — 36592 COLLECT BLOOD FROM PICC: CPT | Performed by: PHYSICIAN ASSISTANT

## 2021-07-30 PROCEDURE — 250N000011 HC RX IP 250 OP 636: Performed by: STUDENT IN AN ORGANIZED HEALTH CARE EDUCATION/TRAINING PROGRAM

## 2021-07-30 PROCEDURE — 250N000009 HC RX 250: Performed by: INTERNAL MEDICINE

## 2021-07-30 PROCEDURE — 49083 ABD PARACENTESIS W/IMAGING: CPT | Performed by: STUDENT IN AN ORGANIZED HEALTH CARE EDUCATION/TRAINING PROGRAM

## 2021-07-30 PROCEDURE — 258N000003 HC RX IP 258 OP 636: Performed by: NURSE PRACTITIONER

## 2021-07-30 PROCEDURE — 84145 PROCALCITONIN (PCT): CPT | Performed by: PHYSICIAN ASSISTANT

## 2021-07-30 PROCEDURE — 250N000011 HC RX IP 250 OP 636: Performed by: NURSE PRACTITIONER

## 2021-07-30 PROCEDURE — 36592 COLLECT BLOOD FROM PICC: CPT | Performed by: NURSE PRACTITIONER

## 2021-07-30 PROCEDURE — 85027 COMPLETE CBC AUTOMATED: CPT | Performed by: INTERNAL MEDICINE

## 2021-07-30 PROCEDURE — 87040 BLOOD CULTURE FOR BACTERIA: CPT | Performed by: PHYSICIAN ASSISTANT

## 2021-07-30 PROCEDURE — 87102 FUNGUS ISOLATION CULTURE: CPT

## 2021-07-30 PROCEDURE — 83605 ASSAY OF LACTIC ACID: CPT | Performed by: PHYSICIAN ASSISTANT

## 2021-07-30 PROCEDURE — 87102 FUNGUS ISOLATION CULTURE: CPT | Performed by: INTERNAL MEDICINE

## 2021-07-30 PROCEDURE — 84478 ASSAY OF TRIGLYCERIDES: CPT

## 2021-07-30 PROCEDURE — 36415 COLL VENOUS BLD VENIPUNCTURE: CPT | Performed by: PHYSICIAN ASSISTANT

## 2021-07-30 PROCEDURE — 87070 CULTURE OTHR SPECIMN AEROBIC: CPT

## 2021-07-30 PROCEDURE — 88185 FLOWCYTOMETRY/TC ADD-ON: CPT | Performed by: STUDENT IN AN ORGANIZED HEALTH CARE EDUCATION/TRAINING PROGRAM

## 2021-07-30 PROCEDURE — 120N000003 HC R&B IMCU UMMC

## 2021-07-30 PROCEDURE — 250N000011 HC RX IP 250 OP 636: Performed by: PHYSICIAN ASSISTANT

## 2021-07-30 PROCEDURE — 71045 X-RAY EXAM CHEST 1 VIEW: CPT

## 2021-07-30 PROCEDURE — 82042 OTHER SOURCE ALBUMIN QUAN EA: CPT

## 2021-07-30 RX ORDER — POTASSIUM CHLORIDE 29.8 MG/ML
20 INJECTION INTRAVENOUS
Status: COMPLETED | OUTPATIENT
Start: 2021-07-30 | End: 2021-07-30

## 2021-07-30 RX ORDER — IOPAMIDOL 755 MG/ML
100 INJECTION, SOLUTION INTRAVASCULAR ONCE
Status: COMPLETED | OUTPATIENT
Start: 2021-07-30 | End: 2021-07-30

## 2021-07-30 RX ORDER — ALBUMIN, HUMAN INJ 5% 5 %
12.5 SOLUTION INTRAVENOUS ONCE
Status: DISCONTINUED | OUTPATIENT
Start: 2021-07-30 | End: 2021-07-30

## 2021-07-30 RX ORDER — ALBUMIN (HUMAN) 12.5 G/50ML
50 SOLUTION INTRAVENOUS ONCE
Status: COMPLETED | OUTPATIENT
Start: 2021-07-30 | End: 2021-07-30

## 2021-07-30 RX ADMIN — PIPERACILLIN SODIUM AND TAZOBACTAM SODIUM 2.25 G: 2; .25 INJECTION, POWDER, LYOPHILIZED, FOR SOLUTION INTRAVENOUS at 02:44

## 2021-07-30 RX ADMIN — HYDROXYZINE HYDROCHLORIDE 25 MG: 25 TABLET, FILM COATED ORAL at 08:25

## 2021-07-30 RX ADMIN — ALBUMIN HUMAN 50 G: 0.25 SOLUTION INTRAVENOUS at 14:41

## 2021-07-30 RX ADMIN — PIPERACILLIN SODIUM AND TAZOBACTAM SODIUM 2.25 G: 2; .25 INJECTION, POWDER, LYOPHILIZED, FOR SOLUTION INTRAVENOUS at 20:40

## 2021-07-30 RX ADMIN — PIPERACILLIN SODIUM AND TAZOBACTAM SODIUM 2.25 G: 2; .25 INJECTION, POWDER, LYOPHILIZED, FOR SOLUTION INTRAVENOUS at 08:13

## 2021-07-30 RX ADMIN — PANCRELIPASE 2 CAPSULE: 24000; 76000; 120000 CAPSULE, DELAYED RELEASE PELLETS ORAL at 21:08

## 2021-07-30 RX ADMIN — SODIUM CHLORIDE, POTASSIUM CHLORIDE, SODIUM LACTATE AND CALCIUM CHLORIDE 500 ML: 600; 310; 30; 20 INJECTION, SOLUTION INTRAVENOUS at 09:08

## 2021-07-30 RX ADMIN — HYDROXYZINE HYDROCHLORIDE 50 MG: 25 TABLET, FILM COATED ORAL at 17:36

## 2021-07-30 RX ADMIN — HYDROMORPHONE HYDROCHLORIDE 2 MG: 1 SOLUTION ORAL at 15:00

## 2021-07-30 RX ADMIN — MELATONIN TAB 3 MG 6 MG: 3 TAB at 21:09

## 2021-07-30 RX ADMIN — SODIUM CHLORIDE 500 ML: 9 INJECTION, SOLUTION INTRAVENOUS at 14:40

## 2021-07-30 RX ADMIN — POTASSIUM CHLORIDE 20 MEQ: 29.8 INJECTION, SOLUTION INTRAVENOUS at 12:43

## 2021-07-30 RX ADMIN — LACTULOSE 20 G: 10 SOLUTION ORAL; RECTAL at 20:40

## 2021-07-30 RX ADMIN — ONDANSETRON 4 MG: 2 INJECTION INTRAMUSCULAR; INTRAVENOUS at 00:32

## 2021-07-30 RX ADMIN — HYDROMORPHONE HYDROCHLORIDE 2 MG: 1 SOLUTION ORAL at 03:15

## 2021-07-30 RX ADMIN — IOPAMIDOL 100 ML: 755 INJECTION, SOLUTION INTRAVENOUS at 08:55

## 2021-07-30 RX ADMIN — HYDROMORPHONE HYDROCHLORIDE 1 MG: 1 SOLUTION ORAL at 12:04

## 2021-07-30 RX ADMIN — HYDROMORPHONE HYDROCHLORIDE 2 MG: 1 SOLUTION ORAL at 08:03

## 2021-07-30 RX ADMIN — PROCHLORPERAZINE EDISYLATE 5 MG: 5 INJECTION INTRAMUSCULAR; INTRAVENOUS at 05:14

## 2021-07-30 RX ADMIN — LACTULOSE 20 G: 10 SOLUTION ORAL; RECTAL at 17:35

## 2021-07-30 RX ADMIN — SODIUM CHLORIDE, POTASSIUM CHLORIDE, SODIUM LACTATE AND CALCIUM CHLORIDE 500 ML: 600; 310; 30; 20 INJECTION, SOLUTION INTRAVENOUS at 03:24

## 2021-07-30 RX ADMIN — POTASSIUM CHLORIDE 20 MEQ: 29.8 INJECTION, SOLUTION INTRAVENOUS at 13:00

## 2021-07-30 RX ADMIN — LINEZOLID 600 MG: 600 INJECTION, SOLUTION INTRAVENOUS at 05:15

## 2021-07-30 RX ADMIN — PIPERACILLIN SODIUM AND TAZOBACTAM SODIUM 2.25 G: 2; .25 INJECTION, POWDER, LYOPHILIZED, FOR SOLUTION INTRAVENOUS at 14:41

## 2021-07-30 RX ADMIN — HYDROMORPHONE HYDROCHLORIDE 2 MG: 1 SOLUTION ORAL at 21:09

## 2021-07-30 RX ADMIN — Medication 550 MG: at 20:40

## 2021-07-30 ASSESSMENT — ACTIVITIES OF DAILY LIVING (ADL)
ADLS_ACUITY_SCORE: 20

## 2021-07-30 ASSESSMENT — MIFFLIN-ST. JEOR: SCORE: 1657.5

## 2021-07-30 NOTE — CONSULTS
Consult and Procedure Service - Procedure Note    Attending: Ata Al MD  Resident: Dr. Jan Vega  Indication: Abdominal pain, sepsis, concern for SBP  Risk Assessment: Coagulopathy and thrombocytopenia  Pre-procedure diagnosis: Small volume ascites  Post-procedure diagnosis: same  Procedure name: Diagnostic paracentesis    The risks and benefits of the procedure were explained to the patient who expressed understanding and opted to proceed.  Consent was obtained and placed in the chart.  A time out was performed.  An area of ascites was located with ultrasound and marked in the left lower quadrant; the area was prepped and draped in the usual sterile fashion.  5 ml of 1% lidocaine was instilled with a 22 gauge needle and ascites located under real-time guidance. 5 ml of yellow colored fluid was removed and sent for analysis and the area dressed.     Patient tolerated the procedure well. Please contact the Consult and Procedure Service if any complications or concerns arise.     DOS:  07/30/21

## 2021-07-30 NOTE — PROGRESS NOTES
Pt came to floor late afternoon. Paracentesis for sampling only done at bedside. Consistently on call light and c/o pain. See MAR for medications. Refused most treatment and ADLS. BMx1. Drains putting out green liquid. Did take lactulose x1 for this nurse. Family at bedside

## 2021-07-30 NOTE — PROGRESS NOTES
Resident/Fellow Attestation   I, Cathryn Zuniga, was present with the medical/NAHUM student who participated in the service and in the documentation of the note.  I have verified the history and personally performed the physical exam and medical decision making.  I agree with the assessment and plan of care as documented in the note.      Cathryn Zuniga MD  PGY1  Date of Service (when I saw the patient): 07/30/21    [facility]    Progress Note - Maroon 3 Service        Date of Admission:  6/28/2021    Assessment & Plan   Dax Villareal is a 30 year old male with hx of alcohol use disorder admitted on 6/28/2021 after recent prolonged admission at [facility] in [place] who has severe acute alcoholic hepatitis c/b HE, ESRD requiring HD, and malnutrition in the setting of acute necrotizing pancreatitis, s/p PEG-J placement on 7/13. He arrived with acute respiratory failure and septic shock that have resolved, and his secondary bacterial peritonitis has been appropriately treated. He requires continued management for ESRD, hepatic encephalopathy, infected pancreatic pseudocyst, nutritional support via PEG-J, and persistent right pleural effusion.     Today:  - Weekly update with family today at 4 pm  - Pt triggered sepsis protocol overnight, likely 2/2 fluid loss (1.5 L from dialysis, increased G tube output, low intake, etc) and ongoing nec panc infection. Lactate remained elevated despite 500 ml bolus; repeat lactate pending. May give additional 500 ml if unchanged.  - CXR today showed moderate R pleural effusion, unchanged from previous  - CT today showed slightly decreased size of necrotic collection s/p stent replacement, adequate positioning of GJ balloon and improved aeration of right lung with small remaining pleural effusion   - ID consulted regarding thora results, will continue empiric abx  - Repeat endoscopic necrosectomy pushed, likely next week  - CAPS consulted for  possible paracentesis given increased abdominal pain     # Necrotizing pancreatitis w/ infected pseudocyst s/p RP Drain and Cystogastrostomy, improving  # Septic Shock, resolved   # Bacterial Peritonitis 2/2 infected pseudocyst, resolved  # Leukocytosis, stable  # Peripancreatic fluid collection growing VRE   Etiology of the septic shock that he came in with likely due to necrotizing pancreatitis, infected pseudocyst, and peritonitis. Had percutaneous RP drain placed for drainage of pancreatic collection. Diagnostic para returned with peritonitis. Per discussion with ID, ongoing leukocytosis is not unexpected with nec panc. CT 7/18 showed unchanged moderate ascites and collection size. Cystgastrostomy on 7/21 with therapeutic para.      CT 7/27 showed pancreatic stent partially in adjacent mesentery and displaced gastrostomy balloon, GI replaced stent on 7/28. CT also showed mild peritoneal thickening, unable to rule out ongoing peritonitis, though patient has clinically been afebrile, no significant changes in abdominal exam.    CT 7/30 showed slightly decreased size of necrotic collection s/p stent replacement, adequate positioning of GJ balloon.      - Continue Zosyn + Linezolid through endoscopic debridement  - Repeat endoscopic necrosectomy pushed, likely next week  - PRN paracentesis  - Daily CBC  - MAP goal > 65  - GI following, appreciate recs               - Pancreatic enzymes    - For back pain: Tylenol PRN and Dilaudid 2mg q3h PRN  - Blood cultures NGTD  - Follow-up repeat blood cultures    # Lactic acidosis, recurrent  Recurrent episodes of lactic acidosis triggering rapid responses. Generally fluid responsive, although have been cautious given patient's delicate fluid state. Ongoing antibiotics w/ IV Zosyn (from ceftriaxone prior). Suspect lactic acidosis is multifactorial, r/t fluid removal from dialysis and fluid loss from emesis as well as ongoing liver disease/pancreatitis.    Pt triggered sepsis  again 7/30 after fluid removal (1.5 L off in dialysis, low PO/TF intake, increased G tube output, etc.) Lactic still 4.2 this AM after 500 mL bolus. GI consulted, no acute concerns/interventions.    - Give 2nd 500 mL bolus, redraw Lactic - if still unchanged, give another 500 ml bolus  - Continue Zosyn + Linezolid  - close monitoring of fluid status     # Right hepatic hydrothorax  Intermittently increased subjective shortness of breath. CXR on 7/19 w/ increased RLL haziness; ddx includes pleural effusion v pneumonia v atelectasis. CT on 7/28 showed large volume R hepatic hydrothorax with complete atelectasis of right lung, bedside thora completed 7/28 with labs and cultures.    CXR 7/30 showed moderate R pleural effusion, unchanged from previous and improved aeration of right lung with small remaining pleural effusion on CT.     - IR completed thoracentesis 7/28 - exudative  - ID following: agree to continue with empiric abx, though pleural effusion most likely 2/2 hepatic hydrothorax and pancreatitis. Deferred to pulm team regarding decision for further drainage/chest tube placement.  - Follow up thora cultures (NGTD)  - Consulted ID for exudative effusion, will continue empiric abx     # Severe malnutrition in setting of chronic illness  PEG-J placed 7/13. Procedure went well. On 7/29, Nutrition increased TFs to goal of 75 mL/hr x16hr (4pm-8am).     - Dietitian consult for new tube feeds, appreciate recs              - Continue TFs at 75 mL/hr x16hr 4pm-8am (though intermittently refusing)               - Cont panc enzymes  - Regular Diet, thin liquids per SLP  - Continue multivitamins  - Pantoprazole 40 mg IV daily  - Zofran PRN     # Alcoholic hepatitis complicated by portal hypertension, ascites, and HE - improving   # Hx of Alcohol use disorder c/b withdrawal   # Anemia  # Coagulopathy   Patient with a history of marked alcohol use and alcoholic hepatitis in the past. Was given a course of steroids (1 week)  during previous admission at OSH. Patient mental status has been stable, alert and oriented x4 for a couple weeks. In early-mid July, patient consistently had 1-2 pRBC infusions weekly d/t Hgb drops of 0.3-0.5 every day (etilogy likely chronic liver disease, chronic kidney disease, phlebotomy, dilution, poor nutrition, ect). Hgb stable around 8.3-8.6 for several days.      - Lactulose 20g QID and PRN and rifaximin 550mg BID - as patient allows  - Trend MELD labs  - Daily folic acid and thiamine  - Delirium precautions  - Melatonin 6mg at bedtime  - Daily CBC   - Transfuse if hemoglobin < 7, platelet < 10 or < 30 with bleeding  - Continue to monitor for signs of bleeding     MELD-Na score: 31 at 7/30/2021  7:52 AM  MELD score: 30 at 7/30/2021  7:52 AM  Calculated from:  Serum Creatinine: 3.79 mg/dL at 7/29/2021  5:37 AM  Serum Sodium: 134 mmol/L at 7/29/2021  5:37 AM  Total Bilirubin: 3.8 mg/dL at 7/29/2021  5:37 AM  INR(ratio): 1.62 at 7/30/2021  7:52 AM  Age: 30 years     # ESRD on HD (MWF)  # Hypervolemia causing acute respiratory failure, resolved  Nephrology note 7/21 indicates that patient has been on renal replacement therapy for two months and pt status has most likely transitioned from ARF to ESRD at this point.          > Hemodialysis as per renal              - Midodrine 10 mg PRN before dialysis sessions     > Daily chemistry labs, strict I/O, and daily weights  - Aspiration precautions  - Supplemental oxygen as needed to keep sats above 89%     # Goals of Care  Please see additional interval note for in depth details regarding conversation on 7/9 about goals of care. Patient's long term prognosis is still quite poor given his multi organ failure. Care conference on 7/15, pt did not wish to participate. Met with sisters Noni (on phone) and Melyssa and close friend Leroy with palliative, nephrology and general medicine teams today to discuss Dax's current improving status, poor prognosis and wishes to  continue full cares. Plan to meet Fridays at 4 pm with teams and family for weekly updates.     - Hepatology confirmed pt will not be a transplant candidate  - Daily phone updates to sister Noni ([phone])       Diet: Snacks/Supplements Adult: Ensure Clear; With Meals  Fluid restriction 1200 ML FLUID  Regular Diet Adult  Adult Formula Drip Feeding: Specified Time Vital 1.5; Jejunostomy; Goal Rate: 75; mL/hr; From: 4:00 PM; 8:00 AM; Medication - Feeding Tube Flush Frequency: At least 15-30 mL water before and after medication administration and with tube clogging; ...    DVT Prophylaxis: Pneumatic Compression Devices  Rdz Catheter: Not present  Fluids:   Central Lines: PRESENT  PICC Triple Lumen 06/28/21 Left-Site Assessment: WDL  CVC Double Lumen Right-Site Assessment: WDL  Code Status: Full Code      Disposition Plan   Expected discharge: >7 days, recommended to transitional care unit once nutrition improved, infected pancreatic pseudocyst is well managed and plan for dialysis is in place.     The patient's care was discussed with the Attending Physician, Dr. Taylor.    Jaime Amin, MS3  Medical Student  [address]  Securely message with the Vocera Web Console (learn more here)  Text page via [employer] Paging/Directory  Please see sign in/sign out for up to date coverage information  _____________________________________________________________________    Interval History   Overnight, patient triggered sepsis protocol with lactic of 4.2. Given 500 mL bolus. Early this morning, pt c/o worsening abdominal pain, overnight medical team ordered CT with contrast.     This morning, patient appears similar to other days. Has increased coughing and shortness of breath, but denies increased WOB compared to yesterday. No vomiting. Continues to have abdominal pain, similar in nature to his prior abdominal pain although slightly worse than usual per  patient.    Data reviewed today: I reviewed all medications, new labs and imaging results over the last 24 hours.     Physical Exam   Vital Signs: Temp: 97.8  F (36.6  C) Temp src: Oral BP: 120/60 Pulse: 120   Resp: 18 SpO2: 99 % O2 Device: Nasal cannula Oxygen Delivery: 3 LPM  Weight: 163 lbs 2.25 oz  Gen: Lying down in bed, alert, appears similar to yesterday.   HEENT: Conjunctival icterus  CV: Tachycardic. Regular rhythm.   Resp: Left lung CTA, moderately diminished lung sounds over right lung, especially lower lobe. No wheezing. Coughing intermittently throughout exam, no significant increase in WOB.   Abd: Refused palpation today, PEG J in place without surrounding erythema. Abdomen less distended than last week.  Skin: Jaundiced.  Ext: Well perfused, no edema  Neuro: Oriented x3, responding appropriately to questions. No focal deficits. Tremulous.    Data   Recent Labs   Lab 07/30/21  0752 07/29/21  1320 07/29/21  0537 07/28/21  1925 07/28/21  1038 07/28/21  0355 07/28/21  0355 07/27/21  0718   WBC 18.2*  --  15.1*  --   --   --  13.4* 14.5*   HGB 8.7*  --  8.6*  --   --   --  8.3* 8.4*   MCV 93  --  92  --   --   --  93 95   *  --  151  --   --   --  171 188   INR  --   --  1.86*  --   --   --  1.86* 1.78*   NA  --   --  134  --   --   --  135 133   POTASSIUM  --   --  3.3*  --   --   --  3.2* 3.5   CHLORIDE  --   --  100  --   --   --  102 98   CO2  --   --  21  --   --   --  25 23   BUN  --   --  40*  --   --   --  27 39*   CR  --   --  3.79*  --   --   --  3.02* 3.96*   ANIONGAP  --   --  13  --   --   --  8 12   JANENE  --   --  8.8  --   --   --  8.4* 8.9   GLC  --  87 152* 107*  --    < > 98 114*   ALBUMIN  --   --  1.8*  --   --   --  1.9* 1.9*   PROTTOTAL  --   --  7.1  --  6.9  --  7.1 7.1   BILITOTAL  --   --  3.8*  --   --   --  4.1* 4.6*   ALKPHOS  --   --  133  --   --   --  133 138   ALT  --   --  12  --   --   --  9 8   AST  --   --  33  --   --   --  27 27    < > = values in this interval  not displayed.

## 2021-07-30 NOTE — PLAN OF CARE
Time: 0700-[place] [facility] [place]    Reason for admission: Septic shock  Vitals: Tachycardic, HR 120s. Soft BPs. SpO2 97% on 3 L NC.   Activity: Ax2 with lift  Pain: C/o increased abdominal pain, PRN PO dilaudid given x 2.   Neuro: A&Ox4 x labile, irritated. PRN atarax given once for anxiety via G tube.   Cardiac: Tachycardic, denies chest pain  Respiratory: C/o SOB, LS diminished at bases, pn 3-4 L NC.  GI/: Abd pain and distension. Refused full abdominal assessment. Oliguric, on HD.   Diet: Regular, aspiration precautions  Lines:  TL PICC. White lumen infusing TKO. Red and gray lumens SL, blood return noted.  Skin/Wounds: Pt covered up with blanket, refused removing for skin assessment.  Labs/imaging: Lactic 4.2. K 3.1, replaced IV.      New changes this shift: RRT called at 0810 for lactic acid 4.2. Tachycardic, soft BP, increased abdominal pian and SOB. 500 mL LR bolus given. STAT Abd CT unremarkable, CXR shows increased right lobe opacities representing infection vs atelectasis. Blood cultures obtained. IV zosyn given. Lactic acid was supposed to be drawn at 1200, lab called twice but not obtained before pt transferred to . Plan for diagnostic para and repeat lactic acid.

## 2021-07-30 NOTE — PROGRESS NOTES
Nephrology Progress Note  07/30/2021            Dax Villareal is a 30 year old male with h/o ETOH hepatitis, end stage liver disease, RADHA on HD, transferred from [facility] in [place] for septic shock on 6/28/21 for treatment of necrotizing pancreatitis and decompensated liver disease. He was initially admitted to [facility] 5/19/21.        Interval History :   Mr Villareal had GI stenting of pancreas for second time on 7/28, continues to have significant abd pain which is limiting activity and ability to eat.  Ran HD yesterday and we were able to pull 1.5L of UF.  Plan for run tomorrow, continuing to support while he recovers from pancreatitis but at high risk for immobility issues.        Assessment & Recommendations:   RADHA-Cr was 0.8 as recently as 5/19/2021 but now HD dependent, started RRT at [facility] prior to transfer to [facility] (exact date unclear but per family it was end of May).  Etiology likely multifactorial with contrast, sepsis/pancreatitis, likely HRS physiology contributing as well.  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  Continuing RRT as long as it is tolerated with regards to BP's, is functional enough to possibly do outpt HD.  MELD is in high 30's so has high 3 month mortality but continuing restorative cares for now.  Had GI stenting of pancreas on 7/21 and 7/28.                 -Line is TDC from PTA               -Holding on run today, ordered for run tomorrow with 2L of UF.       Volume status-Anuric, pulled 1.5L of UF with run yesterday.  No major edema on exam although tends to accumulate fluid as pleural effusion rather than ascites.        Electrolytes/pH-K 3.1 and Na 136 today, on fluid restriction for hyponatremia.  Improved after short run yesterday.        Ca/phos/pth-Ca 8.6, corrects to normal with low albumin.       Anemia-Hgb 8.7, acute management per team, last PRBC's 7/14.       Nutrition-Taking PO, appetite ok.       Discussed with Dr Dow      Recommendations were  "communicated to primary team via verbal communication.        SOFIA Marlow CNS  Clinical Nurse Specialist  [phone]    Review of Systems:   I reviewed the following systems:  Gen: No fevers or chills  CV: No CP at rest  Resp: No SOB at rest  GI: No N/V    Physical Exam:   I/O last 3 completed shifts:  In: 420 [P.O.:50; Other:200; NG/GT:170]  Out: 1400 [Emesis/NG output:650; Drains:750]   /61 (BP Location: Right arm)   Pulse (!) 122   Temp 97.8  F (36.6  C) (Oral)   Resp 18   Ht 1.676 m (5' 5.98\")   Wt 76 kg (167 lb 8.8 oz)   SpO2 97%   BMI 27.06 kg/m       GENERAL APPEARANCE: Mild distress, + abd pain.    EYES:  scleral icterus, pupils equal  Pulmonary: lungs clear to auscultation. Off supplemental oxygen   CV: tachy   - Edema - no LE edema  GI: large distended, tender. Has retroperitoneal drain  MS: no evidence of inflammation in joints, no muscle tenderness  SKIN: no rash, warm, dry, no cyanosis  NEURO: alert/interactive  ACCESS: Right TDC    Labs:   All labs reviewed by me  Electrolytes/Renal - Recent Labs   Lab Test 07/30/21  0752 07/29/21  1320 07/29/21  0537 07/28/21  0355 07/27/21  0718 07/26/21  0601 07/25/21  0553 07/24/21  0700 07/15/21  0811 07/15/21  0443 07/14/21  0345 07/14/21  0345 07/13/21  0718     --  134 135  --  134 132* 130*   < > 131*  --  130* 131*   POTASSIUM 3.1*  --  3.3* 3.2*  --  3.5 3.7 3.9   < > 3.3*  --  4.2 3.6   CHLORIDE 103  --  100 102  --  100 99 98   < > 98  --  98 98   CO2 23  --  21 25  --  24 24 23   < > 23  --  20 23   BUN 25  --  40* 27  --  28 20 32*   < > 26  --  42* 29   CR 2.49*  --  3.79* 3.02*  --  3.16* 2.32* 3.32*   < > 2.30*  --  3.35* 2.60*   * 87 152* 98   < > 79 90 77   < > 131*   < > 137* 94   JANENE 8.6  --  8.8 8.4*  --  8.7 8.1* 8.6   < > 8.1*  --  8.6 8.5   MAG  --   --   --   --   --   --   --   --   --  1.6  --  1.6 1.5*   PHOS  --   --   --   --   --  5.2* 3.7 5.8*   < > 3.0  --  4.6* 4.3    < > = values in this interval " not displayed.       CBC -   Recent Labs   Lab Test 07/30/21  0752 07/29/21  0537 07/28/21  0355   WBC 18.2* 15.1* 13.4*   HGB 8.7* 8.6* 8.3*   * 151 171       LFTs -   Recent Labs   Lab Test 07/30/21  0752 07/29/21  0537 07/28/21  1038 07/28/21  0355   ALKPHOS 129 133  --  133   BILITOTAL 4.4* 3.8*  --  4.1*   ALT 16 12  --  9   AST 46* 33  --  27   PROTTOTAL 7.0 7.1 6.9 7.1   ALBUMIN 1.8* 1.8*  --  1.9*       Iron Panel -   Recent Labs   Lab Test 07/19/21  0632   IRON 31*   IRONSAT 43           Current Medications:    amylase-lipase-protease  2 capsule Oral TID w/meals     B and C vitamin Complex with folic acid  5 mL Per Feeding Tube Daily     folic acid  1 mg Oral Daily    Or     folic acid  1 mg Intravenous Daily     lactulose  20 g Oral or NG Tube 4x Daily     melatonin  6 mg Oral At Bedtime     pantoprazole  40 mg Oral QAM AC     piperacillin-tazobactam  2.25 g Intravenous Q6H     rifaximin  550 mg Oral BID     sodium chloride (PF)  100 mL Irrigation Q4H LENKA     sodium chloride (PF)  3 mL Intracatheter Q8H     sodium chloride (PF)  3 mL Intracatheter Q8H     vitamin B1  100 mg Oral Daily    Or     thiamine  100 mg Intravenous Daily       - MEDICATION INSTRUCTIONS -       dextrose       Attestation:  This patient has been seen, examined and evaluated by me, Yu Dow MD as a shared visit with the NP/PA above.     In brief:  Dax Villareal is a 30 year old male with ESLD, RADHA and acute pancreatitis complicated by a large right pleural effusion. On RRT since the end of May. Doing better s/p replacement of drain.  Pleural fluid has reacummulated.       Physical exam, vital signs I and O and labs reviewed     Abd remains distended and tender to palpation.  no edema     Bili 4.6, alb 1.9.      Assessment:  # ESLD: not a transplant candidate  # RADHA: Do not expect renal recovery  # pancreatitis: continued abd pain.  Drain replaced  # pleural effusion: S/P tap and fluid is exudative, likely extension from  abd cavity      Plan:   HD tomorrow     Yu Dow MD      [phone]

## 2021-07-30 NOTE — PROGRESS NOTES
Rapid Response Team Note    Assessment   A rapid response was called on Dax Villareal due to SIRS/Sepsis trigger and lactic acidosis. This presentation is likely due dehydration to  and worsened by necrotizing pamcreatitis.     Patient currently with diarrhea and increased output from drain (550 ml in the last 3 hours)    Plan   -  500 ml LR over 2 hours  - repeat lactic acid with AM labs  - consider imaging     -  The Internal Medicine primary team was at bedside.  -  Disposition: The patient will remain on the current unit. We will continue to monitor this patient closely.  -  Reassessment and plan follow-up will be performed by the primary team    SOFIA Velez CNP  [facility] RRT AMCOM Job Code Contact #[phone]    Hospital Course   Brief Summary of events leading to rapid response:   BPA for tachycardia and leukocytosis      Admission Diagnosis:   Pancreatitis [K85.90]     Physical Exam   Temp: 97.8  F (36.6  C) Temp  Min: 97.5  F (36.4  C)  Max: 97.8  F (36.6  C)  Resp: 18 Resp  Min: 11  Max: 25  SpO2: 98 % SpO2  Min: 93 %  Max: 99 %  Pulse: (!) 121 Pulse  Min: 90  Max: 121    No data recorded  BP: 106/56 Systolic (24hrs), Av , Min:100 , Max:118   Diastolic (24hrs), Av, Min:50, Max:78     I/Os: I/O last 3 completed shifts:  In: 660 [P.O.:50; Other:400]  Out: 3525 [Emesis/NG output:550; Drains:1475; Other:1500]     Exam:   General: chronically ill appearing  Mental Status: baseline mental status.      Significant Results and Procedures   Lactic Acid:   Recent Labs   Lab Test 21  0225 21  0005 21  2132 21  2215 21  2116 21  2106 21  210   LACT 4.2* 2.7* 3.9*   < >  --   --   --    LACTS  --   --   --   --  1.1 0.9 1.5    < > = values in this interval not displayed.     CBC:   Recent Labs   Lab Test 21  0537 21  0355 21  0718   WBC 15.1* 13.4* 14.5*   HGB 8.6* 8.3* 8.4*   HCT 25.5* 25.3* 25.7*    171 188        Sepsis  "Evaluation   The patient is known to have an infection.      Anti-infectives (From now, onward)    Start     Dose/Rate Route Frequency Ordered Stop    07/27/21 2230  piperacillin-tazobactam (ZOSYN) 2.25 g vial to attach to  ml bag     Note to Pharmacy: For [facility], [facility] and [facility]: For Zosyn-naive patients, use the \"Zosyn initial dose + extended infusion\" order panel.    2.25 g  over 30 Minutes Intravenous EVERY 6 HOURS 07/27/21 2222      07/26/21 1400  linezolid (ZYVOX) infusion 600 mg      600 mg  over 60 Minutes Intravenous EVERY 12 HOURS 07/26/21 1350      07/05/21 0830  rifaximin (XIFAXAN) oral suspension 550 mg      550 mg Oral 2 TIMES DAILY 07/05/21 0757          Current antibiotic coverage is appropriate for source of infection.     "

## 2021-07-30 NOTE — PLAN OF CARE
"/56 (BP Location: Right arm)   Pulse (!) 121   Temp 97.8  F (36.6  C) (Oral)   Resp 18   Ht 1.676 m (5' 5.98\")   Wt 74 kg (163 lb 2.3 oz)   SpO2 98%   BMI 26.34 kg/m      Time: 1464-6750    Reason for admission: Severe Malnutrition   Activity: Lift. Refusing Q2hr turns. Pulsate mattress. Bed alarm In place for pt safety.  Pain: Abd pain- PRN dilaudid given.   Neuro: Oriented x4. Lethargic. Arousal to voice.   Cardiac: Tachycardic. Soft BP. Denies chest pain.  Respiratory: Pt on 3L via NC for comfort. SOB.   GI/: BM x4. Oliguria- pt on hemodialysis. Nausea w/ emesis.   Diet: Regular & cycled tube feeds. Tube feeds held this AM due to pt preference and nausea.   Lines: PICC- Infusing antibiotics. R CVC.   Labs/Imaging: Lact: 4.2    New changes this shift: Pt having nausea w/ emesis at beginning of shift. PRN Zofran given w/ little relief. Pt triggered sepsis. Elevated lactic. Rapid called. Provider ordered 500 ml LR bolus & recheck lactic in AM. Pt having increased output & gas through G tube. Pt intermittently refusing 100 ml flush- able to give once. Pt having increased Abd pain, nausea, & emeis this AM. Cross cover notified- plan for stat ABD CT. Pt intermittently refusing cares.     Continue to monitor and follow POC     "

## 2021-07-30 NOTE — PROGRESS NOTES
[facility] PROGRESS NOTE     Patient:  Dax Villareal   YOB: 1990, MRN: [id number]  Date of Visit: 07/30/2021  Date of Admission: 6/28/2021  Consult Requester: Kun Taylor,*          Assessment and Recommendations:   ID Problem List:  1. Pleural effusion  2. Lactic acidosis  3. Acute necrotizing pancreatitis s/p cystgastrostomy stenting with necrosectomy 7/21  4. RADHA requiring iHD  5. Decompensated ESLD c/b HE  6. Alcoholic hepatitis  7. Ascites    Recommendations:   1. Stop linezolid, may be doing more harm than good at this point. Previously cultured VRE and VSE should have been sufficiently treated by now.   2. Continue Zosyn while awaiting pleural effusion cx  3. Agree with repeat paracentesis given continued presence of large ascites on imaging  4. Could check another sputum cx if concerned for pneumonia given imaging findings.    Discussion:  Dax Villareal is a 30 year old male with PMHx significant for alcohol use disorder who was admitted to [facility] in [place], [place] for abdominal pain, nausea and vomiting, found to have end stage liver disease c/b hepatic encephalopathy, acute renal failure requiring iHD, SBP and acute necrotizing pancreatitis. ID last signed off 7/23 with recs to transition from broad spectrum antibiotics back to po cipro on 7/26 (see previous note for additional detail).     ID was called back because patient has since undergone R thoracentesis on 7/28 with findings consistent with exudative effusion with an LDH and glucose that were overall were not consistent with empyema and the gross fluid appearance was not noted to appear as pus either, though a pH was not sent. He has had intermittently elevated lactate and so primary team has patient on empiric linezolid and zosyn currently. He otherwise remains afebrile, hemodynamically stable. Overall WBC is improved during admission although continues to have leukocytosis likely  "related to ongoing necrotizing pancreatitis. Repeat CXR 7/28 (after thora) showing continued moderate sized R pleural effusion. CT AP notes improved aeration of right lung with mmixed GGOs in the right middle lung that could represent sequela of reexpansion, although infection not excluded. If pneumonia is a concern would recheck sputum sample. Previously grown VSE faecium has not been adequately treated with >1 week of linezolid. Suspect the exudative effusion is due to combination of hepatic hydrothorax and pancreatitis, though given his clinical status it would be reasonable to continue empiric antibiotics while fluid cultures are pending.     Thank you for the consult. ID will continue to follow with you.    Rosa Elena Horan PA-C   Pronouns: she/her/hers  Infectious Diseases  Pager: [phone]  07/30/2021         Interval History and Events:   Afebrile and stably on 3L NC. Pleural fluid culture so far with no growth at less than 1 day. Dax reports pain \"all over\", especially in abdomen. Breathing still feels labored.          Antimicrobial Treatment:   Current:  - Linezolid 7/21  - Zosyn 7/27    Past:  - Vancomcyin 6/29  - Meropenem 6/29-7/18  - Micafungin 6/29-7/1  - Cipro 7/197/21  - Ceftriaxone 7/21-7/24; 7/26-7/27         Review of Systems:   Targeted 4 point ROS was completed with pertinent positives and negatives are detailed above.         HPI:   Adopted from initial consult note:    Dax Villareal is a 30 year old male with PMHx significant for alcohol use disorder who was admitted to [facility] in [place], [place] for abdominal pain, nausea and vomiting, found to have end stage liver disease c/b hepatic encephalopathy, acute renal failure requiring iHD, SBP and acute necrotizing pancreatitis.      He required respiratory support (BIPAP, ? Intubation) for respiratory failure and appeared to be in sepsis (lactate reported at 7.7 with abnormal LFTs). He was transferred to [facility] for prolonged " "admission and reportedly treated with steroids; kidney function and pancreatic enzymes normal on admission). Length of stay stated >40 days. CT imaging 6/19 with concern for necrotizing pancreatitis with follow up CT scan 6/25 with peripancreatic fluid collection and 9 cm soft tissue/hemorrhagic structure c/f internal hemorrhage of a pseudocyst. Given multiple antibiotics (Cefepime, Flagyl, Ciprofloxacin, micafungin). Hospital course reportedly complicated by hypoxemic respiratory failure requiring BIPAP, ARF requiring iHD, hepatic encephalopathy, and septic shock 2/2 necrotizing pancreatitis/pancreatic pseudocyst requiring pressor support. Ultimately transferred to [facility] for interventions 6/28. Admitted to the ICU.      Now status post perc IR drain placement 6/29 with cultures that remain NGTD/negative. No endoscopic drainage by GI was pursued given improvement in imaging 7/11. Additionally has had 3 paracenteses (7/2, 7/5, and 7/13 with negative culture work up. Had PEGJ placed 7/13.      He is an outdoorsman. Picks mushrooms, goes fishing/hunting, and hiking. Has not been since \"last spring\". Denies feeling ill prior to admission to OSH. Feeling fairly well currently although notes diffuse abdominal pain. Has had multiple procedures within the past few days and states he feels like he needs a \"rest day\". Denies fever/chills. No URI sx. Is having diarrhea, but on lactulose. Anuric.           Physical Examination:   Temp:  [97.5  F (36.4  C)-98  F (36.7  C)] 98  F (36.7  C)  Pulse:  [] 125  Resp:  [16-20] 20  BP: (106-120)/(50-60) 116/50  SpO2:  [95 %-99 %] 98 %    I/O last 3 completed shifts:  In: 470 [P.O.:50; Other:300; NG/GT:120]  Out: 3700 [Emesis/NG output:1150; Drains:1050; Other:1500]    Vitals:    07/27/21 1108 07/28/21 0009 07/29/21 0539 07/29/21 1146   Weight: 76 kg (167 lb 8.8 oz) 75.5 kg (166 lb 7.2 oz) 75.5 kg (166 lb 7.2 oz) 74 kg (163 lb 2.3 oz)    07/30/21 1013   Weight: 76 kg (167 lb 8.8 oz) " "      Constitutional:Young adult male seen lying in bed, sleeping but wakes to voice, in NAD.   HEENT: NC/AT, EOMI, scleral icterus, conjunctiva normal, NC in place, OP with MMM  Respiratory: No increased work of breathing.  GI: somewhat distended  Skin: Warm, dry, well-perfused. No bruising, bleeding, rashes, or lesions on limited exam.  Neurologic: A&O. Answers questions appropriately, speech normal.   Neuropsychiatric: Calm, flat. Affect appropriate to situation.           Medications:       amylase-lipase-protease  2 capsule Oral TID w/meals     B and C vitamin Complex with folic acid  5 mL Per Feeding Tube Daily     folic acid  1 mg Oral Daily    Or     folic acid  1 mg Intravenous Daily     lactulose  20 g Oral or NG Tube 4x Daily     linezolid  600 mg Intravenous Q12H     melatonin  6 mg Oral At Bedtime     pantoprazole  40 mg Oral QAM AC     piperacillin-tazobactam  2.25 g Intravenous Q6H     potassium chloride  20 mEq Intravenous Q1H     rifaximin  550 mg Oral BID     sodium chloride (PF)  100 mL Irrigation Q4H LENKA     sodium chloride (PF)  3 mL Intracatheter Q8H     sodium chloride (PF)  3 mL Intracatheter Q8H     vitamin B1  100 mg Oral Daily    Or     thiamine  100 mg Intravenous Daily       Antiinfectives:  Anti-infectives (From now, onward)    Start     Dose/Rate Route Frequency Ordered Stop    07/27/21 2230  piperacillin-tazobactam (ZOSYN) 2.25 g vial to attach to  ml bag     Note to Pharmacy: For [facility], [facility] and [facility]: For Zosyn-naive patients, use the \"Zosyn initial dose + extended infusion\" order panel.    2.25 g  over 30 Minutes Intravenous EVERY 6 HOURS 07/27/21 2222      07/26/21 1400  linezolid (ZYVOX) infusion 600 mg      600 mg  over 60 Minutes Intravenous EVERY 12 HOURS 07/26/21 1350      07/05/21 0830  rifaximin (XIFAXAN) oral suspension 550 mg      550 mg Oral 2 TIMES DAILY 07/05/21 0757            Infusions/Drips:    - MEDICATION INSTRUCTIONS -       dextrose              Laboratory " Data:     Microbiology:  Culture   Date Value Ref Range Status   07/28/2021 No growth after 1 day  Preliminary   07/28/2021 No growth after 1 day  Preliminary   07/28/2021 No anaerobic organisms isolated after 1 day  Preliminary   07/27/2021 No growth after 2 days  Preliminary   07/27/2021 No growth after 2 days  Preliminary   07/26/2021 No growth after 3 days  Preliminary   07/26/2021 No growth after 3 days  Preliminary   07/21/2021 No Growth  Final   07/19/2021 2+ Candida albicans (A)  Final     Comment:     Susceptibilities not routinely done   07/19/2021 2+ Enterococcus faecium (A)  Final   07/16/2021 4+ Candida albicans (A)  Final     Comment:     Susceptibilities not routinely done   07/16/2021 1+ Normal fausto  Final   07/16/2021 No Growth  Final   07/16/2021 No Growth  Final   07/16/2021 No Growth  Final   07/14/2021 Culture in progress  Final   07/14/2021 4+ Enterococcus faecium VRE (A)  Final     Comment:     Preliminary result, confirmation pending.   07/14/2021 4+ Enterococcus faecium VRE (A)  Final     Comment:     Strain 2   07/14/2021 No anaerobic organisms isolated  Final   07/13/2021 No Growth  Final       Inflammatory Markers    Recent Labs   Lab Test 10/06/20  0927   CRP 16.9*       Metabolic Studies       Recent Labs   Lab Test 07/30/21  0935 07/30/21  0752 07/29/21  1320 07/29/21  0537 07/28/21  1925 07/28/21  1746 07/28/21  0355 07/27/21  0718 07/26/21  0601 07/25/21  0553 07/15/21  0811 07/15/21  0443 07/14/21  1945 07/14/21  0345   NA  --  136  --  134  --   --  135 133 134 132*   < > 131*  --  130*   POTASSIUM  --  3.1*  --  3.3*  --   --  3.2* 3.5 3.5 3.7   < > 3.3*  --  4.2   CHLORIDE  --  103  --  100  --   --  102 98 100 99   < > 98  --  98   CO2  --  23  --  21  --   --  25 23 24 24   < > 23  --  20   ANIONGAP  --  10  --  13  --   --  8 12 10 9   < > 10  --  12   BUN  --  25  --  40*  --   --  27 39* 28 20   < > 26  --  42*   CR  --  2.49*  --  3.79*  --   --  3.02* 3.96* 3.16* 2.32*    < > 2.30*  --  3.35*   GFRESTIMATED  --  33*  --  20*  --   --  26* 19* 25* 36*   < > 36*  --  23*   GLC  --  100* 87 152* 107* 107* 98 114* 79 90   < > 131*   < > 137*   JANENE  --  8.6  --  8.8  --   --  8.4* 8.9 8.7 8.1*   < > 8.1*  --  8.6   PHOS  --   --   --   --   --   --   --   --  5.2* 3.7   < > 3.0  --  4.6*   MAG  --   --   --   --   --   --   --   --   --   --   --  1.6  --  1.6   LACT 4.3* 4.2*  --   --   --   --   --   --   --   --    < >  --   --   --     < > = values in this interval not displayed.       Hepatic Studies    Recent Labs   Lab Test 07/30/21 0752 07/29/21 0537 07/28/21  1038 07/28/21 0355 07/27/21  0718 07/26/21  0601 07/25/21  0553   BILITOTAL 4.4* 3.8*  --  4.1* 4.6* 5.1* 5.1*   ALKPHOS 129 133  --  133 138 147 169*   ALBUMIN 1.8* 1.8*  --  1.9* 1.9* 2.2* 2.3*   AST 46* 33  --  27 27 32 38   ALT 16 12  --  9 8 11 12   LDH  --   --  106  --   --   --   --        Pancreatitis testing    Recent Labs   Lab Test 06/29/21 0410 06/28/21  1839 05/18/21  1220 12/09/20  1504 10/06/20  0927   LIPASE  --  199 381 140 139   TRIG Unsatisfactory specimen - icteric Canceled, Test credited  --   --   --        Hematology Studies      Recent Labs   Lab Test 07/30/21 0752 07/29/21 0537 07/28/21  0355 07/27/21  0718 07/26/21  0601 07/25/21  0553 06/29/21  0410 06/28/21  1839 05/18/21  1220 12/09/20  1504 10/06/20  0927   WBC 18.2* 15.1* 13.4* 14.5* 16.4* 24.5*   < > 50.6* 13.2* 4.2 6.9   ANEU  --   --   --   --   --   --   --  44.0* 12.0* 2.1 5.2   ALYM  --   --   --   --   --   --   --  0.0* 0.6* 1.3 0.8   MEDHAT  --   --   --   --   --   --   --  2.0* 0.4 0.6 0.8   AEOS  --   --   --   --   --   --   --  0.5 0.0 0.2 0.1   HGB 8.7* 8.6* 8.3* 8.4* 8.5* 8.3*   < > 7.6* 16.6 17.2 18.8*   HCT 26.9* 25.5* 25.3* 25.7* 26.5* 25.5*   < > 23.4* 44.8 47.7 51.2   * 151 171 188 219 208   < > 127* 222 299 311    < > = values in this interval not displayed.       Arterial Blood Gas Testing    Recent Labs   Lab  Test 07/01/21  0014 06/30/21  1716 06/28/21  1839   O2PER 60.0 5L 2L        Body Fluid Studies  Recent Labs   Lab Test 07/28/21  1216 07/21/21  0842 07/16/21  1154 07/16/21  1154 07/13/21  1037 07/05/21  1605 07/02/21  1250 07/02/21  1250   FTYP  --   --   --   --   --  Ascites  --  Ascites   FCOL [place]* Yellow*  --  Yellow*  --  Orange   < > Pink   FAPR Cloudy* Cloudy*   < > Cloudy*  --  Cloudy   < > Cloudy   FRBC 26  --   --   --   --   --   --   --    FWBC 457 453  --  401  --  1801   < > 2663   FNEU 18 71   < > 19  --  61   < > 74   FLYM 48 17   < > 43  --  32   < > 19   FMONO  --  12  --  38  --  6   < > 7   FBAS  --   --   --   --   --  1  --   --    FALB  --  1.8  --  1.6   < > 2.6  --  2.0   FTP 4.7  --   --  4.0  --  5.2   < > 4.1   GS  --   --   --   --   --  No organisms seen  Moderate  WBC'S seen    --  No organisms seen  Moderate  WBC'S seen  predominantly PMN's      < > = values in this interval not displayed.       Drug Level Monitoring  Recent Labs   Lab Test 06/30/21  0700   VANCOMYCIN 31.2*       Hepatitis B Testing   Recent Labs   Lab Test 06/30/21  1245   HEPBANG Nonreactive       Last check of C difficile  C Difficile Toxin B by PCR   Date Value Ref Range Status   07/17/2021 Negative Negative Final     Comment:     A negative result does not exclude actual disease due to C. difficile and may be due to improper collection, handling and storage of the specimen or the number of organisms in the specimen is below the detection limit of the assay.       COVID-19 Testing  Recent Labs   Lab Test 07/06/21  2030 06/29/21  0559 05/18/21  1203   [id number] Nasopharyngeal Nasopharyngeal Nasopharyngeal   SARSCOVRES NEGATIVE NEGATIVE NEGATIVE              Imaging:       Recent Results (from the past 48 hour(s))   XR Surgery CESIA Fluoro L/T 5 Min w Stills    Narrative    This exam was marked as non-reportable because it will not be read by a   radiologist or a [facility] non-radiologist provider.         CT  Abdomen Pelvis w Contrast    Narrative    EXAMINATION: CT ABDOMEN PELVIS W CONTRAST, 7/30/2021 8:58 AM    TECHNIQUE:  Helical CT images from the lung bases through the  symphysis pubis were obtained with IV contrast. Contrast dose:  iopamidol (ISOVUE-370) solution 100 mL    COMPARISON: 7/27/2021, 7/18/2021    HISTORY: Pancreatitis, necrotizing    FINDINGS:    Abdomen and pelvis:     Ongoing sequela of necrotic pancreatitis. Slightly decreased size of  the now 13.2 x 4.4 cm anterior peripancreatic collection which  previously measured 14.7 x 5.7 cm when measured similarly. This fluid  extends along the anterior portion of the pancreas as well as towards  the splenic hilum bordering the anterior perirenal fascia. Left  lateral approach pigtail surgical drain remains in place.  Repositioning of the cystogastrostomy stent now with double pigtail  catheter coursing through and well-positioned within the lateral  aspect of the collection. There is a smaller pocket of fluid not  definitely connected with this collection just anterior to the aorta  seen on series 5 image 20 and 66 measuring 4.5 x 4.1 cm, previously  4.7 x 3.6 cm. Fluid extends along the lateral aspects of the abdomen  adjacent to the liver and spleen and into the pelvis similar to prior.  Continued heterogeneous enhancement of the pancreatic head and neck.    Unchanged tiny subcentimeter hypodensities in the liver too small to  characterize. No suspicious hepatic lesions. Gallbladder is  nondistended with reactive appearing pericholecystic fluid. Mild  splenomegaly measuring 13.8 cm similar to prior. Enlarged, edematous,  and hypodense kidney similar to prior. No hydronephrosis. No stones  along the expected course of the ureters. Bladder is incompletely  distended.    Bowel is nondilated. Colon is largely filled with liquid stool with  reactive pericolonic inflammation. Prominent central mesenteric lymph  nodes similar to prior and presumably reactive.  Scattered ascites.  Percutaneous gastrojejunostomy has been repositioned with now  appropriate intraluminal position of the balloon.    No abdominal aortic aneurysm. Main portal vein and the portal  confluence appears grossly patent.    Bones: Mild degenerative changes. No suspicious osseous lesions. No  evidence of avascular necrosis.    Lung bases: Improved aeration of the right lung still with small right  pleural effusion and adjacent atelectasis. There are now mixed  groundglass and consolidative opacities in the inferior right middle  lobe, possibly sequela of reexpansion. Partially visualized catheter  is in the right atrium.      Impression    IMPRESSION:   1. Ongoing sequela of necrotizing pancreatitis with slightly decreased  size of the peripancreatic necrotic collection status post  cystogastrostomy stent repositioning. Left lateral approach drain  remains in place  2. Adequate repositioning of the gastrojejunostomy with balloon in the  stomach lumen.  3. Continued appearance of ATN in the kidneys. No hydronephrosis.  4. Improved aeration of the right lung. Small right pleural effusion  with adjacent atelectasis. Mixed groundglass opacities in the right  middle lobe possibly sequela of reexpansion, although infection is not  excluded.  5. Slight interval improvement in the ascites although it still  remains quite large.    I have personally reviewed the examination and initial interpretation  and I agree with the findings.    SANDY STEPHEN MD         SYSTEM ID:  [id number]   XR Chest Port 1 View    Narrative    Exam: XR CHEST PORT 1 VIEW, 7/30/2021 9:38 AM    Comparison: Chest x-ray 7/28/2021    History: Sepsis work up\    Findings:  Single portable upright view of the chest obtained. Right internal  jugular tunneled central venous catheter in place, tip terminates at  the superior right atrium. Left upper extremity PICC tip terminates in  the superior cavoatrial junction.    Trachea is midline.  Mediastinum is within normal limits.  Cardiopulmonary silhouette is within normal limits. Continued  right-sided pleural effusion and associated atelectasis. Slightly  increased right opacity may indicate infection/worsening atelectasis.  No pneumothorax. The upper abdomen is unremarkable.      Impression    Impression:   1. Continued right-sided pleural effusion and associated atelectasis.  2. Slightly increased right lung field opacities which may represent  infection versus worsening atelectasis.    I have personally reviewed the examination and initial interpretation  and I agree with the findings.    BRIAN SANCHEZ MD         SYSTEM ID:  [id number]

## 2021-07-30 NOTE — PROVIDER NOTIFICATION
-------------------CRITICAL LAB VALUE-------------------    Lab Value: lactic acid 4.2  Time of notification: 2:01 PM  MD notified: M3 intern and RRT called per protocol.  Patient status: No acute changes. Vital signs unchanged.  Temp:  [97.5  F (36.4  C)-98  F (36.7  C)] 98  F (36.7  C)  Pulse:  [] 125  Resp:  [16-20] 20  BP: (106-120)/(50-60) 116/50  SpO2:  [95 %-99 %] 98 %  Orders received: 50 gm albumin and repeat lactic in 4 hours.

## 2021-07-30 NOTE — PROGRESS NOTES
07/30/21 0200   Call Information   Date of Call 07/30/21   Time of Call 0241   Name of person requesting the team Dorothy   Title of person requesting team RN   RRT Arrival time 0242   Time RRT ended 0325   Reason for call   Type of RRT Adult   Primary reason for call Sepsis suspected   Sepsis Suspected Elevated Lactate level;Heart Rate > 100;WBC <4 or >12   Was patient transferred from the ED, ICU, or PACU within last 24 hours prior to RRT call? No   SBAR   Situation Lactic Acid 4.2   Background History of alcohol use disorder admitted on 6/28/2021 after recent prolonged admission at [facility] who has severe acute alcoholic hepatitis c/b HE, ESRD requiring HD, and malnutrition in the setting of acute necrotizing pancreatitis, s/p PEG-J placement on 7/13. He arrived with acute respiratory failure and septic shock that have resolved, and his secondary bacterial peritonitis has been appropriately treated. He requires continued management for ESRD, hepatic encephalopathy, infected pancreatic pseudocyst, nutritional support via PEG-J.   Notable History/Conditions End-Stage disease;Organ failure;Recent surgery   Assessment Oriented to self and situation. Having nausea and some emesis. Denies new pain. G tube with significant output.    Interventions Fluid bolus;Other (describe)  (adapt a larger bag for G tube output)   Patient Outcome   Patient Outcome Stabilized on unit   RRT Team   Date Attending Physician notified 07/30/21   Physician(s) SOFIA Molina CNP   Lead RN Ruperto Acevedo   Post RRT Intervention Assessment   Post RRT Assessment Stable/Improved   Date Follow Up Done 07/30/21   Time Follow Up Done 0650

## 2021-07-30 NOTE — PROGRESS NOTES
07/30/21 0800   Call Information   Date of Call 07/30/21   Time of Call 0810   Name of person requesting the team Janneth ADAMS   Title of person requesting team RN   RRT Arrival time 0812   Time RRT ended 0825   Reason for call   Type of RRT Adult   Primary reason for call Sepsis suspected   Sepsis Suspected Elevated Lactate level;Heart Rate > 100;WBC <4 or >12   Was patient transferred from the ED, ICU, or PACU within last 24 hours prior to RRT call? No   SBAR   Situation Lactic Acid 4.2   Background History of alcohol use disorder admitted on 6/28/2021 after recent prolonged admission at [facility] who has severe acute alcoholic hepatitis c/b HE, ESRD requiring HD, and malnutrition in the setting of acute necrotizing pancreatitis, s/p PEG-J placement on 7/13. He arrived with acute respiratory failure and septic shock that have resolved, and his secondary bacterial peritonitis has been appropriately treated. He requires continued management for ESRD, hepatic encephalopathy, infected pancreatic pseudocyst, nutritional support via PEG-J.   Notable History/Conditions End-Stage disease;Organ failure;Recent surgery   Assessment Guarded abdomen   Interventions Fluid bolus;Other (describe)  (applied larger output bag to G Tube)   RRT Team   Date Attending Physician notified 07/30/21   Physician(s) Venus Riggins PA-C   Lead RN Ruperto ADAMS   RT Cherrie   Post RRT Intervention Assessment   Post RRT Assessment Stable/Improved   Date Follow Up Done 07/30/21   Time Follow Up Done 1050   Comments VSS remain stable. awating LA recheck. (0935 LA was drawn too early 4.3)

## 2021-07-30 NOTE — CODE/RAPID RESPONSE
Rapid Response Team Note    Assessment   In assessment a rapid response was called on Dax Villareal due to SIRS/Sepsis trigger and lactic acidosis. This presentation is likely due to sepsis and alcoholic hepatitis with nec panc and worsened by ESRD on HD, severe malnutrition .     Plan   -  Blood cultures, CXR, UA, stat CT abdomen and pelvis, if has ascites recommend repeat para. Follow up on plueral studies from    -  Continue linezolid and zosyn, consider switching to daptomycin  -  500 ml bolus with repeat lactic in 4 hours   -  The [facility] primary team was at bedside able to be reached and they are in agreement with the above plan.  -  Disposition: The patient will be transferred to [facility] [place].  -  Reassessment and plan follow-up will be performed by the primary team      Venus Riggins PA-C  [facility] RRT AMCOM Job Code Contact #[phone]    Hospital Course   Brief Summary of events leading to rapid response:   RRT was called for lactic acid of 4.2, repeat lab ordered from prior at 4.2 overnight.     Patient continues to have diffuse abdominal pain, even when listening with stethoscope which has been progressing over the last week. Reports SOB. No fevers. VS with tachycardia to 120s, mild tachypnea, but normotensive. WBC trending up slightly.     Admission Diagnosis:   Pancreatitis [K85.90]     Physical Exam   Temp: 98  F (36.7  C) Temp  Min: 97.5  F (36.4  C)  Max: 98  F (36.7  C)  Resp: 20 Resp  Min: 11  Max: 25  SpO2: 97 % SpO2  Min: 93 %  Max: 99 %  Pulse: (!) 127 Pulse  Min: 90  Max: 127    No data recorded  BP: 108/57 Systolic (24hrs), Av , Min:100 , Max:120   Diastolic (24hrs), Av, Min:50, Max:78     I/Os: I/O last 3 completed shifts:  In: 470 [P.O.:50; Other:300; NG/GT:120]  Out: 3700 [Emesis/NG output:1150; Drains:1050; Other:1500]     Exam:   General: acutely ill appearing  Mental Status: baseline mental status.  Resp: mildly tachypnea on 3L O2 NC. Lungs clear on anterior ascultation. Refused  "listening posteriorly due to abdominal pain.   Abd: TTP with mild pressure of stethoscopy on abdomen, concerning for peritoneal signs. Hypoactive BS. GJ tube in place    Significant Results and Procedures   Lactic Acid:   Recent Labs   Lab Test 07/30/21  0752 07/30/21  0225 07/28/21  0005 07/09/21  2215 07/08/21  2116 07/07/21  2106 07/06/21  2106   LACT 4.2* 4.2* 2.7*   < >  --   --   --    LACTS  --   --   --   --  1.1 0.9 1.5    < > = values in this interval not displayed.     CBC:   Recent Labs   Lab Test 07/30/21  0752 07/29/21  0537 07/28/21  0355   WBC 18.2* 15.1* 13.4*   HGB 8.7* 8.6* 8.3*   HCT 26.9* 25.5* 25.3*   * 151 171        Sepsis Evaluation   The patient is known to have an infection.  Dax Villareal meets SIRS criteria AND has a lactate >2 or other evidence of acute organ damage.  These vital signs, lab and physical exam findings constitute a diagnosis of SEVERE SEPSIS.       Anti-infectives (From now, onward)    Start     Dose/Rate Route Frequency Ordered Stop    07/27/21 2230  piperacillin-tazobactam (ZOSYN) 2.25 g vial to attach to  ml bag     Note to Pharmacy: For [facility], [facility] and [facility]: For Zosyn-naive patients, use the \"Zosyn initial dose + extended infusion\" order panel.    2.25 g  over 30 Minutes Intravenous EVERY 6 HOURS 07/27/21 2222      07/26/21 1400  linezolid (ZYVOX) infusion 600 mg      600 mg  over 60 Minutes Intravenous EVERY 12 HOURS 07/26/21 1350      07/05/21 0830  rifaximin (XIFAXAN) oral suspension 550 mg      550 mg Oral 2 TIMES DAILY 07/05/21 0757          Current antibiotic coverage is appropriate for source of infection.    3 Hour Severe Sepsis Bundle Completion:  1. Initial Lactic Acid result shown above. Repeat lactic acid ordered for 2 hours from now.   2. Blood Cultures before Antibiotics: Yes  3. Broad Spectrum Antibiotics Administered: yes  4. Fluids: 500 mL fluids ORDERED to be given     "

## 2021-07-30 NOTE — PLAN OF CARE
Shift: [id number]    Neuro:    - A&Ox4     Cardiac:   WNL     Respiratory:    - SPO2 > 90% on 3L.     /GI:   - G/J tube. Tube feeding through J tube (4pm-8am) 75ml/hr, 30ml of flushes q4hr. Medications can be given though G tube during tube feeding. G tube open to gravity.      Diet/appetite:    - Regular diet     Activity:     - Up with Lift.   - Bed Alarm Active.     Pain:    - At acceptable level on current regimen.      Skin:    -  Pt refused skin assessments x2     LDA's:   - CVC   - 3 Lumen Picc   - Abdominal Drain   - JG Tube     Plan:    - Continue with POC.    - Notify primary team with changes.

## 2021-07-30 NOTE — CODE/RAPID RESPONSE
Rapid Response Team Note    Assessment   In assessment a rapid response was called on Dax Villareal due to lactic acidosis. This presentation is likely due to alcoholic hepatitis and necrotizing pancreatitis, intravascular depletion, with possible underlying infection and worsened by ESRD on HD and malnutrition.     Plan   -  Agree with paracentesis for infectious work up  -  Continue zosyn, appreciate ID recs  -  Obtain sputum culture, strep pneum urinary and legionella antigen (if patient still makes any urine) . Consider azithromycin for atypical PNA coverage.   -  Albumin 50 grams x1 and NS bolus 500 ml. Repeat lactic acid in 4 hours   -  The [facility] primary team was able to be reached and they are in agreement with the above plan.  -  Disposition: The patient will remain on the current unit. We will continue to monitor this patient closely.  -  Reassessment and plan follow-up will be performed by the primary team      Venus Riggins PA-C  [facility] RRT AMCOM Job Code Contact #[phone]    Hospital Course   Brief Summary of events leading to rapid response:   RRT was called for lactic acidosis 4.2, unchanged from prior this morning. VS unchanged with tachycardia in 120s. Afebrile. Normotensive. Patient states his abdominal pain has improved from this morning after receiving oral dilaudid. Denies any other changes since this morning.     CXR with continued right sided pleural effusion and slightly increased right lung field opacities, atelectasis vs infection.   CT abd/pelvis with ongoing sequela of nec panc, slightly decreased necrotic collection, with left lateral drain in place. Mixed ground glass opacities in RML, with infection not excluded. Large ascites present     COVD swab on 7/28 negative.     Admission Diagnosis:   Pancreatitis [K85.90]     Physical Exam   Temp: 98  F (36.7  C) Temp  Min: 97.5  F (36.4  C)  Max: 98  F (36.7  C)  Resp: 18 Resp  Min: 16  Max: 20  SpO2: 100 % SpO2  Min: 95 %  Max: 100  "%  Pulse: (!) 122 Pulse  Min: 97  Max: 127    No data recorded  BP: 114/63 Systolic (24hrs), Av , Min:106 , Max:120   Diastolic (24hrs), Av, Min:50, Max:63     I/Os: I/O last 3 completed shifts:  In: 470 [P.O.:50; Other:300; NG/GT:120]  Out: 3700 [Emesis/NG output:1150; Drains:1050; Other:1500]     Exam:   General: chronically ill appearing. Appears more comfortable.   Mental Status: Alert and awake. .  Resp: mildly labored breathing on 3L NC.   CV: Tachycardic rate, regular rhythm  Abd: Soft. TTP in epigastrium without any rebound tenderness   Extremities: WWP.  Skin: Jaundice     Significant Results and Procedures   Lactic Acid:   Recent Labs   Lab Test 21  1337 21  0935 21  0752 21  2215 21  2116 21  2106 21  2106   LACT 4.2* 4.3* 4.2*   < >  --   --   --    LACTS  --   --   --   --  1.1 0.9 1.5    < > = values in this interval not displayed.     CBC:   Recent Labs   Lab Test 21  0752 21  0537 21  0355   WBC 18.2* 15.1* 13.4*   HGB 8.7* 8.6* 8.3*   HCT 26.9* 25.5* 25.3*   * 151 171        Sepsis Evaluation   The patient is known to have an infection.     Anti-infectives (From now, onward)    Start     Dose/Rate Route Frequency Ordered Stop    21 2230  piperacillin-tazobactam (ZOSYN) 2.25 g vial to attach to  ml bag     Note to Pharmacy: For [facility], [facility] and [facility]: For Zosyn-naive patients, use the \"Zosyn initial dose + extended infusion\" order panel.    2.25 g  over 30 Minutes Intravenous EVERY 6 HOURS 21 2222      21 0830  rifaximin (XIFAXAN) oral suspension 550 mg      550 mg Oral 2 TIMES DAILY 21 5740          Current antibiotic coverage is appropriate for source of infection.    "

## 2021-07-31 LAB
ALBUMIN SERPL-MCNC: 1.9 G/DL (ref 3.4–5)
ALP SERPL-CCNC: 111 U/L (ref 40–150)
ALT SERPL W P-5'-P-CCNC: 14 U/L (ref 0–70)
AMYLASE FLD-CCNC: 12 U/L
ANION GAP SERPL CALCULATED.3IONS-SCNC: 12 MMOL/L (ref 3–14)
AST SERPL W P-5'-P-CCNC: 38 U/L (ref 0–45)
BACTERIA BLD CULT: NO GROWTH
BACTERIA BLD CULT: NO GROWTH
BILIRUB SERPL-MCNC: 4.6 MG/DL (ref 0.2–1.3)
BUN SERPL-MCNC: 34 MG/DL (ref 7–30)
CALCIUM SERPL-MCNC: 8.9 MG/DL (ref 8.5–10.1)
CHLORIDE BLD-SCNC: 100 MMOL/L (ref 94–109)
CO2 SERPL-SCNC: 21 MMOL/L (ref 20–32)
CREAT SERPL-MCNC: 3.35 MG/DL (ref 0.66–1.25)
ERYTHROCYTE [DISTWIDTH] IN BLOOD BY AUTOMATED COUNT: 16.2 % (ref 10–15)
GFR SERPL CREATININE-BSD FRML MDRD: 23 ML/MIN/1.73M2
GLUCOSE BLD-MCNC: 68 MG/DL (ref 70–99)
GRAM STAIN RESULT: NORMAL
GRAM STAIN RESULT: NORMAL
HCT VFR BLD AUTO: 25.9 % (ref 40–53)
HGB BLD-MCNC: 8.2 G/DL (ref 13.3–17.7)
INR PPP: 1.73 (ref 0.85–1.15)
MAGNESIUM SERPL-MCNC: 1.6 MG/DL (ref 1.6–2.3)
MCH RBC QN AUTO: 30.5 PG (ref 26.5–33)
MCHC RBC AUTO-ENTMCNC: 31.7 G/DL (ref 31.5–36.5)
MCV RBC AUTO: 96 FL (ref 78–100)
PHOSPHATE SERPL-MCNC: 3.5 MG/DL (ref 2.5–4.5)
PLATELET # BLD AUTO: 124 10E3/UL (ref 150–450)
POTASSIUM BLD-SCNC: 4.1 MMOL/L (ref 3.4–5.3)
PROT SERPL-MCNC: 6.6 G/DL (ref 6.8–8.8)
RBC # BLD AUTO: 2.69 10E6/UL (ref 4.4–5.9)
SODIUM SERPL-SCNC: 133 MMOL/L (ref 133–144)
WBC # BLD AUTO: 15.2 10E3/UL (ref 4–11)

## 2021-07-31 PROCEDURE — 90935 HEMODIALYSIS ONE EVALUATION: CPT | Performed by: INTERNAL MEDICINE

## 2021-07-31 PROCEDURE — 250N000013 HC RX MED GY IP 250 OP 250 PS 637: Performed by: INTERNAL MEDICINE

## 2021-07-31 PROCEDURE — 90937 HEMODIALYSIS REPEATED EVAL: CPT

## 2021-07-31 PROCEDURE — 120N000003 HC R&B IMCU UMMC

## 2021-07-31 PROCEDURE — 99233 SBSQ HOSP IP/OBS HIGH 50: CPT | Mod: GC | Performed by: STUDENT IN AN ORGANIZED HEALTH CARE EDUCATION/TRAINING PROGRAM

## 2021-07-31 PROCEDURE — 258N000003 HC RX IP 258 OP 636: Performed by: CLINICAL NURSE SPECIALIST

## 2021-07-31 PROCEDURE — 84100 ASSAY OF PHOSPHORUS: CPT | Performed by: STUDENT IN AN ORGANIZED HEALTH CARE EDUCATION/TRAINING PROGRAM

## 2021-07-31 PROCEDURE — 99207 PR CDG-CUT & PASTE-POTENTIAL IMPACT ON LEVEL: CPT | Performed by: STUDENT IN AN ORGANIZED HEALTH CARE EDUCATION/TRAINING PROGRAM

## 2021-07-31 PROCEDURE — 36592 COLLECT BLOOD FROM PICC: CPT | Performed by: INTERNAL MEDICINE

## 2021-07-31 PROCEDURE — 83735 ASSAY OF MAGNESIUM: CPT | Performed by: STUDENT IN AN ORGANIZED HEALTH CARE EDUCATION/TRAINING PROGRAM

## 2021-07-31 PROCEDURE — 250N000009 HC RX 250: Performed by: INTERNAL MEDICINE

## 2021-07-31 PROCEDURE — 85027 COMPLETE CBC AUTOMATED: CPT | Performed by: INTERNAL MEDICINE

## 2021-07-31 PROCEDURE — 80053 COMPREHEN METABOLIC PANEL: CPT | Performed by: INTERNAL MEDICINE

## 2021-07-31 PROCEDURE — 85610 PROTHROMBIN TIME: CPT | Performed by: INTERNAL MEDICINE

## 2021-07-31 PROCEDURE — 250N000011 HC RX IP 250 OP 636: Performed by: INTERNAL MEDICINE

## 2021-07-31 RX ORDER — SODIUM BICARBONATE 325 MG/1
325 TABLET ORAL EVERY 4 HOURS
Status: DISCONTINUED | OUTPATIENT
Start: 2021-07-31 | End: 2021-10-12 | Stop reason: HOSPADM

## 2021-07-31 RX ADMIN — HYDROMORPHONE HYDROCHLORIDE 0.5 MG: 1 INJECTION, SOLUTION INTRAMUSCULAR; INTRAVENOUS; SUBCUTANEOUS at 22:46

## 2021-07-31 RX ADMIN — PIPERACILLIN SODIUM AND TAZOBACTAM SODIUM 2.25 G: 2; .25 INJECTION, POWDER, LYOPHILIZED, FOR SOLUTION INTRAVENOUS at 17:55

## 2021-07-31 RX ADMIN — MELATONIN TAB 3 MG 6 MG: 3 TAB at 22:46

## 2021-07-31 RX ADMIN — Medication: at 12:25

## 2021-07-31 RX ADMIN — HYDROMORPHONE HYDROCHLORIDE 2 MG: 1 SOLUTION ORAL at 00:16

## 2021-07-31 RX ADMIN — HYDROMORPHONE HYDROCHLORIDE 2 MG: 1 SOLUTION ORAL at 08:25

## 2021-07-31 RX ADMIN — ONDANSETRON 4 MG: 2 INJECTION INTRAMUSCULAR; INTRAVENOUS at 17:55

## 2021-07-31 RX ADMIN — Medication 550 MG: at 08:01

## 2021-07-31 RX ADMIN — SODIUM CHLORIDE 300 ML: 9 INJECTION, SOLUTION INTRAVENOUS at 12:25

## 2021-07-31 RX ADMIN — Medication 550 MG: at 20:20

## 2021-07-31 RX ADMIN — Medication 40 MG: at 07:53

## 2021-07-31 RX ADMIN — SODIUM CHLORIDE 250 ML: 9 INJECTION, SOLUTION INTRAVENOUS at 12:25

## 2021-07-31 RX ADMIN — THIAMINE HCL TAB 100 MG 100 MG: 100 TAB at 17:58

## 2021-07-31 RX ADMIN — LACTULOSE 20 G: 10 SOLUTION ORAL; RECTAL at 07:54

## 2021-07-31 RX ADMIN — HYDROXYZINE HYDROCHLORIDE 50 MG: 25 TABLET, FILM COATED ORAL at 17:56

## 2021-07-31 RX ADMIN — LACTULOSE 20 G: 10 SOLUTION ORAL; RECTAL at 17:55

## 2021-07-31 RX ADMIN — PIPERACILLIN SODIUM AND TAZOBACTAM SODIUM 2.25 G: 2; .25 INJECTION, POWDER, LYOPHILIZED, FOR SOLUTION INTRAVENOUS at 02:34

## 2021-07-31 RX ADMIN — HYDROMORPHONE HYDROCHLORIDE 2 MG: 1 SOLUTION ORAL at 04:29

## 2021-07-31 RX ADMIN — FOLIC ACID 1 MG: 1 TABLET ORAL at 17:57

## 2021-07-31 RX ADMIN — HYDROXYZINE HYDROCHLORIDE 25 MG: 25 TABLET, FILM COATED ORAL at 07:51

## 2021-07-31 RX ADMIN — HYDROMORPHONE HYDROCHLORIDE 2 MG: 1 SOLUTION ORAL at 11:58

## 2021-07-31 RX ADMIN — Medication 5 ML: at 17:56

## 2021-07-31 RX ADMIN — HYDROXYZINE HYDROCHLORIDE 50 MG: 25 TABLET, FILM COATED ORAL at 00:15

## 2021-07-31 RX ADMIN — PANCRELIPASE 2 CAPSULE: 24000; 76000; 120000 CAPSULE, DELAYED RELEASE PELLETS ORAL at 18:17

## 2021-07-31 RX ADMIN — HYDROMORPHONE HYDROCHLORIDE 2 MG: 1 SOLUTION ORAL at 17:55

## 2021-07-31 RX ADMIN — MIDODRINE HYDROCHLORIDE 10 MG: 5 TABLET ORAL at 07:51

## 2021-07-31 ASSESSMENT — ACTIVITIES OF DAILY LIVING (ADL)
ADLS_ACUITY_SCORE: 20
ADLS_ACUITY_SCORE: 22
ADLS_ACUITY_SCORE: 22

## 2021-07-31 ASSESSMENT — MIFFLIN-ST. JEOR
SCORE: 1647.5
SCORE: 1647.5

## 2021-07-31 NOTE — PROGRESS NOTES
HEMODIALYSIS TREATMENT NOTE    Date: 7/31/2021  Time: 4:18 PM    Data:  Pre Wt: 75 kg (165 lb 5.5 oz)   Desired Wt: 73 kg   Post Wt: 73.5 kg (162 lb 0.6 oz)  Weight change: 1.5 kg  Ultrafiltration - Post Run Net Total Removed (mL): 1500 mL  Vascular Access Status: patent  Dialyzer Rinse: Streaked, Light  Total Blood Volume Processed: 77.1 L Liters  Total Dialysis (Treatment) Time: 3.5 Hours    Lab:       Interventions:  3.5 hours of HD with 1500 mls pulled with no complications    Assessment:  RADHA/HRS patient in for his regular HD run A+O VSS     Plan:    HD tuesday

## 2021-07-31 NOTE — PROGRESS NOTES
07/30/21 1400   Call Information   Date of Call 07/30/21   Time of Call 1403   Name of person requesting the team Anupama   Title of person requesting team RN   Reason for call   Type of RRT Adult   Primary reason for call Sepsis suspected   Sepsis Suspected Elevated Lactate level;WBC <4 or >12;Heart Rate > 100   Was patient transferred from the ED, ICU, or PACU within last 24 hours prior to RRT call? No   SBAR   Situation LA 4.2   Background History of alcohol use disorder admitted on 6/28/2021 after recent prolonged admission at [facility] who has severe acute alcoholic hepatitis c/b HE, ESRD requiring HD, and malnutrition in the setting of acute necrotizing pancreatitis, s/p PEG-J placement on 7/13. He arrived with acute respiratory failure and septic shock that have resolved, and his secondary bacterial peritonitis has been appropriately treated. He requires continued management for ESRD, hepatic encephalopathy, infected pancreatic pseudocyst, nutritional support via PEG-J.   Notable History/Conditions Recent surgery;Organ failure;End-Stage disease   Interventions Fluid bolus;Meds;Labs  (recheck LA in 4 hrs. )   Patient Outcome   Patient Outcome Stabilized on unit  (DX paracentesis done at bedside)   RRT Team   Date Attending Physician notified 07/30/21   Physician(s) SUSIE Best RN    Post RRT Intervention Assessment   Post RRT Assessment Stable/Improved   Date Follow Up Done 07/30/21   Time Follow Up Done 1730   Comments vitals remain stable  LA will be rechecked this evening

## 2021-07-31 NOTE — PROGRESS NOTES
"Mr. Villareal is a 29 yo man with a hx of cirrhosis, ESKD and recurrent pancreatitis admitted for same with septic shock on 6/28. He has had pancreatic stenting X 2 (last on 7/28). He has been on dialysis via a R tunneled catheter. Last run was 7/29 and we pulled 1.5 L. Intake has been minimal. Yesterday he was up 200 ml's by I/O's.     ROS:  Sig ab pain  No chest pain  No nausea      Vital signs:  Temp: 98.2  F (36.8  C) Temp src: Axillary BP: 121/71 Pulse: 116   Resp: 23 SpO2: 98 % O2 Device: Nasal cannula Oxygen Delivery: 3 LPM Height: 167.6 cm (5' 5.98\") Weight: 75 kg (165 lb 5.5 oz)  Estimated body mass index is 26.7 kg/m  as calculated from the following:    Height as of this encounter: 1.676 m (5' 5.98\").    Weight as of this encounter: 75 kg (165 lb 5.5 oz).    Gen: Lying flat. NAD in dialysis.   HEENT: pupils reactive. Eyes icteric  Lungs: clear anterior with no wheezing  Card: tachycardic, regular with no rub or murmur  Abd: PEG in place. Soft. Will not permit palpation.  Ext: no LE edema  Neuro: no myoclonus    A/P:    1. RADHA likely now ESKD: seen on dialysis. Has been dialysis dependent. Dialysis three times a week.  Today I decreased the UF goal to 1.5 and changed to a UF profile #1. He does not appear to have sig fluid on either exam nor by I/O's. Will monitor heart rate and BP.     2. Anemia: Hgb is 8.2.     3. Acid Base: Bicarbonate is 21. Running on a bicarb bath of 32 and will increase to 38.     Discussed with nursing the patient and family.     Last Comprehensive Metabolic Panel:  Sodium   Date Value Ref Range Status   07/31/2021 133 133 - 144 mmol/L Final   07/11/2021 126 (L) 133 - 144 mmol/L Final     Potassium   Date Value Ref Range Status   07/31/2021 4.1 3.4 - 5.3 mmol/L Final   07/11/2021 4.6 3.4 - 5.3 mmol/L Final     Chloride   Date Value Ref Range Status   07/31/2021 100 94 - 109 mmol/L Final   07/11/2021 96 94 - 109 mmol/L Final     Carbon Dioxide   Date Value Ref Range Status   07/11/2021 " 20 20 - 32 mmol/L Final     Carbon Dioxide (CO2)   Date Value Ref Range Status   07/31/2021 21 20 - 32 mmol/L Final     Anion Gap   Date Value Ref Range Status   07/31/2021 12 3 - 14 mmol/L Final   07/11/2021 10 3 - 14 mmol/L Final     Glucose   Date Value Ref Range Status   07/31/2021 68 (L) 70 - 99 mg/dL Final   07/11/2021 95 70 - 99 mg/dL Final     Urea Nitrogen   Date Value Ref Range Status   07/31/2021 34 (H) 7 - 30 mg/dL Final   07/11/2021 41 (H) 7 - 30 mg/dL Final     Creatinine   Date Value Ref Range Status   07/31/2021 3.35 (H) 0.66 - 1.25 mg/dL Final   07/11/2021 3.20 (H) 0.66 - 1.25 mg/dL Final     GFR Estimate   Date Value Ref Range Status   07/31/2021 23 (L) >60 mL/min/1.73m2 Final     Comment:     As of July 11, 2021, eGFR is calculated by the CKD-EPI creatinine equation, without race adjustment. eGFR can be influenced by muscle mass, exercise, and diet. The reported eGFR is an estimation only and is only applicable if the renal function is stable.   07/11/2021 24 (L) >60 mL/min/[1.73_m2] Final     Comment:     Non  GFR Calc  Starting 12/18/2018, serum creatinine based estimated GFR (eGFR) will be   calculated using the Chronic Kidney Disease Epidemiology Collaboration   (CKD-EPI) equation.       Calcium   Date Value Ref Range Status   07/31/2021 8.9 8.5 - 10.1 mg/dL Final   07/11/2021 8.3 (L) 8.5 - 10.1 mg/dL Final       Pia Hanley mD MS FNKf

## 2021-07-31 NOTE — PROVIDER NOTIFICATION
07/30/21 2200   Call Information   Date of Call 07/30/21   Time of Call 2152   Name of person requesting the team Alma Pastrana    Title of person requesting team RN   RRT Arrival time 2156   Time RRT ended 2200   Reason for call   Type of RRT Adult   Primary reason for call Sepsis suspected   Sepsis Suspected Elevated Lactate level;Heart Rate > 100   Was patient transferred from the ED, ICU, or PACU within last 24 hours prior to RRT call? No   SBAR   Situation LA 4.0   Background 30 year old male with PMHx significant for alcohol use disorder who has had a prolonged hospitalization at [facility] in [place] for acute alcohol hepatitis/end stage liver disease complicated by hepatic encephalopathy, acute renal failure requiring hemodialysis, acute hypoxemic respiratory failure and septic shock secondary to acute necrotizing pancreatitis. Transferred to [facility] ICU on 6/28/2021 for further management of necrotizing pancreatitis    Notable History/Conditions End-Stage disease;Organ failure;Recent surgery   Assessment Awake/drowsy in bed. no new complaints, status unchanged.    Interventions No interventions   Patient Outcome   Patient Outcome Stabilized on unit   RRT Team   Attending/Primary/Covering Physician [facility] 3   Date Attending Physician notified 07/30/21   Time Attending Physician notified 2149   Physician(s) Gordon Lucia NP   Lead RN Zeina Pastrana   RT Bill   Post RRT Intervention Assessment   Post RRT Assessment Stable/Improved   Date Follow Up Done 07/31/21   Time Follow Up Done 0200   Comments VSS. No change.

## 2021-07-31 NOTE — PROGRESS NOTES
Resident/Fellow Attestation   I, Cathryn Zuniga, was present with the medical/NAHUM student who participated in the service and in the documentation of the note.  I have verified the history and personally performed the physical exam and medical decision making.  I agree with the assessment and plan of care as documented in the note.      Cathryn Zuniga MD  PGY1  Date of Service (when I saw the patient): 07/31/21    [facility]    Progress Note - Maroon 3 Service        Date of Admission:  6/28/2021    Assessment & Plan   Dax Villareal is a 30 year old male with hx of alcohol use disorder admitted on 6/28/2021 after recent prolonged admission at [facility] in [place] who has severe acute alcoholic hepatitis c/b HE, ESRD requiring HD, and malnutrition in the setting of acute necrotizing pancreatitis, s/p PEG-J placement on 7/13. He arrived with acute respiratory failure and septic shock that have resolved, and his secondary bacterial peritonitis has been appropriately treated. He requires continued management for ESRD, hepatic encephalopathy, infected pancreatic pseudocyst, nutritional support via PEG-J, and persistent right pleural effusion.     Today:  - Dialysis today with plan for gentle fluid removal ~1L  - DC'd Linezolid 7/30 as pt completed sufficient course for VRE  - Thora cultures negative for two days; awaiting paracentesis fluid cultures       # Necrotizing pancreatitis w/ infected pseudocyst s/p RP Drain and Cystogastrostomy, improving  # Septic Shock, resolved   # Bacterial Peritonitis 2/2 infected pseudocyst, resolved  # Leukocytosis, stable  # Peripancreatic fluid collection growing VRE   Etiology of the septic shock that he came in with likely due to necrotizing pancreatitis, infected pseudocyst, and peritonitis. Had percutaneous RP drain placed for drainage of pancreatic collection. Diagnostic para returned with peritonitis. Per discussion with ID, ongoing  leukocytosis is not unexpected with nec panc. CT 7/18 showed unchanged moderate ascites and collection size. Cystgastrostomy on 7/21 with therapeutic para.      CT 7/27 showed pancreatic stent partially in adjacent mesentery and displaced gastrostomy balloon, GI replaced stent on 7/28. CT also showed mild peritoneal thickening, unable to rule out ongoing peritonitis, though patient has clinically been afebrile, no significant changes in abdominal exam.     CT 7/30 showed slightly decreased size of necrotic collection s/p stent replacement, adequate positioning of GJ balloon.      - Continue Zosyn through endoscopic debridement  - Repeat endoscopic necrosectomy pushed, likely next week  - PRN paracentesis  - Daily CBC  - MAP goal > 65  - GI following, appreciate recs               - Pancreatic enzymes    - For back pain: Tylenol PRN and Dilaudid 2mg q3h PRN  - Blood cultures NGTD     # Lactic acidosis, recurrent  Recurrent episodes of lactic acidosis triggering rapid responses. Generally fluid responsive, although have been cautious given patient's delicate fluid state. Ongoing antibiotics w/ IV Zosyn (from ceftriaxone prior). Suspect lactic acidosis is multifactorial, r/t fluid removal from dialysis and fluid loss from emesis as well as ongoing liver disease/pancreatitis.     Pt triggered sepsis again 7/30 after fluid removal (1.5 L off in dialysis, low PO/TF intake, increased G tube output, etc.) Lactic still elevated at 4.0 s/p 3x 500 mL bolus and albumin. GI consulted, no acute concerns/interventions.     - Lactic still at 4.0, no acute interventions needed at this time  - Continue Zosyn  - close monitoring of fluid status     # Right hepatic hydrothorax  Intermittently increased subjective shortness of breath. CXR on 7/19 w/ increased RLL haziness; ddx includes pleural effusion v pneumonia v atelectasis. CT on 7/28 showed large volume R hepatic hydrothorax with complete atelectasis of right lung, bedside trish  completed 7/28 with labs and cultures.     CXR 7/30 showed moderate R pleural effusion, unchanged from previous and improved aeration of right lung with small remaining pleural effusion on CT. Thora cultures negative.     - IR completed thoracentesis 7/28 - exudative  - ID following: agree to continue with empiric abx, though pleural effusion most likely 2/2 hepatic hydrothorax and pancreatitis. Deferred to pulm team regarding decision for further drainage/chest tube placement.     # Severe malnutrition in setting of chronic illness  PEG-J placed 7/13. Procedure went well. On 7/29, Nutrition increased TFs to goal of 75 mL/hr x16hr (4pm-8am).     - Dietitian consult for new tube feeds, appreciate recs              - Switch from TF x16 hrs to continue TF to see if this is more tolerable for him              - Cont panc enzymes  - Regular Diet, thin liquids per SLP  - Continue multivitamins  - Pantoprazole 40 mg IV daily  - Zofran PRN     # Alcoholic hepatitis complicated by portal hypertension, ascites, and HE - improving   # Hx of Alcohol use disorder c/b withdrawal   # Anemia  # Coagulopathy   Patient with a history of marked alcohol use and alcoholic hepatitis in the past. Was given a course of steroids (1 week) during previous admission at OSH. Patient mental status has been stable, alert and oriented x4 for a couple weeks. In early-mid July, patient consistently had 1-2 pRBC infusions weekly d/t Hgb drops of 0.3-0.5 every day (etilogy likely chronic liver disease, chronic kidney disease, phlebotomy, dilution, poor nutrition, ect). Hgb stable around 8.3-8.6 for several days.      - Lactulose 20g QID and PRN and rifaximin 550mg BID - as patient allows  - Trend MELD labs  - Daily folic acid and thiamine  - Delirium precautions  - Melatonin 6mg at bedtime  - Daily CBC   - Transfuse if hemoglobin < 7, platelet < 10 or < 30 with bleeding  - Continue to monitor for signs of bleeding     MELD-Na score: 31 at 7/31/2021   6:03 AM  MELD score: 30 at 7/31/2021  6:03 AM  Calculated from:  Serum Creatinine: 3.35 mg/dL at 7/31/2021  6:03 AM  Serum Sodium: 133 mmol/L at 7/31/2021  6:03 AM  Total Bilirubin: 4.6 mg/dL at 7/31/2021  6:03 AM  INR(ratio): 1.73 at 7/31/2021  6:03 AM  Age: 30 years     # ESRD on HD (MWF)  # Hypervolemia causing acute respiratory failure, resolved  Nephrology note 7/21 indicates that patient has been on renal replacement therapy for two months and pt status has most likely transitioned from ARF to ESRD at this point.          > Hemodialysis as per renal              - Midodrine 10 mg PRN before dialysis sessions     > Daily chemistry labs, strict I/O, and daily weights  - Aspiration precautions  - Supplemental oxygen as needed to keep sats above 89%    # Apathy, concern for depression  Patient is intermittently refusing cares due to wanting a 'break,' but also reaffirms that he wants everything done to prolong his life. Mood is depressed, apathetic at times. Patient has multiple stressors including his own medical conditions and family health issues.  - will consider psychiatry consult in the future     # Goals of Care  Please see additional interval note for in depth details regarding conversation on 7/9 about goals of care. Patient's long term prognosis is still quite poor given his multi organ failure. Care conference on 7/15, pt did not wish to participate. Met with gurinder Noni (on phone) and Melyssa and close friend Leroy with palliative, nephrology and general medicine teams today to discuss Dax's current improving status, poor prognosis and wishes to continue full cares. Plan to meet Fridays at 4 pm with teams and family for weekly updates.     - Hepatology confirmed pt will not be a transplant candidate  - Daily phone updates to sister Noni ([phone])     Diet: Snacks/Supplements Adult: Ensure Clear; With Meals  Fluid restriction 1200 ML FLUID  Regular Diet Adult  Adult Formula Drip Feeding: Specified  Time Vital 1.5; Jejunostomy; Goal Rate: 75; mL/hr; From: 4:00 PM; 8:00 AM; Medication - Feeding Tube Flush Frequency: At least 15-30 mL water before and after medication administration and with tube clogging; ...    DVT Prophylaxis: Pneumatic Compression Devices  Rdz Catheter: Not present  Fluids: 10 mL NS TKO  Central Lines: PRESENT  PICC Triple Lumen 06/28/21 Left-Site Assessment: WDL  CVC Double Lumen Right-Site Assessment: WDL  Code Status: Full Code      Disposition Plan   Expected discharge: >7 days, recommended to transitional care unit once  nutrition improved, infected pancreatic pseudocyst is well managed and plan for dialysis is in place.     The patient's care was discussed with the Attending Physician, Dr. Taylor.    Jaime Amin, MS3  Medical Student  [address]  Securely message with the Vocera Web Console (learn more here)  Text page via [employer] Paging/Directory  Please see sign in/sign out for up to date coverage information  _____________________________________________________________________    Interval History   Nursing notes reviewed. Last night, lactic still elevated at 4.0 s/p 3 x 500 mL bolus and albumin. Clinical status appeared to be at baseline, no further interventions needed.    Today, patient is feeling 'okay.' He reports continued pain, but stable from yesterday. Denies shortness of breath. He states that he is very tired and just 'wants a break' today.    Data reviewed today: I reviewed all medications, new labs and imaging results over the last 24 hours.    Physical Exam   Vital Signs: Temp: 97.7  F (36.5  C) Temp src: Oral BP: 132/75 Pulse: (!) 121   Resp: 20 SpO2: 99 % O2 Device: Nasal cannula Oxygen Delivery: 2 LPM  Weight: 165 lbs 5.52 oz  Gen: Lying down in bed, alert, appears similar to yesterday.   HEENT: Conjunctival icterus  CV: Tachycardic. Regular rhythm.   Resp: Left lung CTA, moderately diminished lung  sounds over right lung, especially lower lobe. No wheezing. Coughing intermittently throughout exam, no significant increase in WOB.   Abd: Refused palpation today, PEG J in place without surrounding erythema. Abdomen less distended than last week.  Skin: Jaundiced.  Ext: Well perfused, no edema  Neuro: Oriented x3, responding appropriately to questions. No focal deficits. Tremulous.    Data   Recent Labs   Lab 07/31/21  0603 07/30/21  0752 07/29/21  1320 07/29/21  0537 07/28/21  1925   WBC 15.2* 18.2*  --  15.1*  --    HGB 8.2* 8.7*  --  8.6*  --    MCV 96 93  --  92  --    * 148*  --  151  --    INR 1.73* 1.62*  --  1.86*  --     136  --  134  --    POTASSIUM 4.1 3.1*  --  3.3*  --    CHLORIDE 100 103  --  100  --    CO2 21 23  --  21  --    BUN 34* 25  --  40*  --    CR 3.35* 2.49*  --  3.79*  --    ANIONGAP 12 10  --  13  --    JANENE 8.9 8.6  --  8.8  --    GLC 68* 100* 87 152*  --    ALBUMIN 1.9* 1.8*  --  1.8*   < >   PROTTOTAL 6.6* 7.0  --  7.1   < >   BILITOTAL 4.6* 4.4*  --  3.8*   < >   ALKPHOS 111 129  --  133   < >   ALT 14 16  --  12   < >   AST 38 46*  --  33   < >    < > = values in this interval not displayed.     Recent Results (from the past 24 hour(s))   XR Chest Port 1 View    Narrative    Exam: XR CHEST PORT 1 VIEW, 7/30/2021 9:38 AM    Comparison: Chest x-ray 7/28/2021    History: Sepsis work up\    Findings:  Single portable upright view of the chest obtained. Right internal  jugular tunneled central venous catheter in place, tip terminates at  the superior right atrium. Left upper extremity PICC tip terminates in  the superior cavoatrial junction.    Trachea is midline. Mediastinum is within normal limits.  Cardiopulmonary silhouette is within normal limits. Continued  right-sided pleural effusion and associated atelectasis. Slightly  increased right opacity may indicate infection/worsening atelectasis.  No pneumothorax. The upper abdomen is unremarkable.      Impression     Impression:   1. Continued right-sided pleural effusion and associated atelectasis.  2. Slightly increased right lung field opacities which may represent  infection versus worsening atelectasis.    I have personally reviewed the examination and initial interpretation  and I agree with the findings.    BRIAN SANCHEZ MD         SYSTEM ID:  [id number]   POC US Guide for Paracentesis    Impression    POCUS Abdomen    Indication: Evaluate for ascites for safe site for paracentesis    Findings:  LLQ: Small volume pelvic ascites    Impression: LLQ fluid pocket is a safe site for paracentesis.

## 2021-07-31 NOTE — CODE/RAPID RESPONSE
Rapid Response Team Note    Assessment   A rapid response was called on Dax Villareal due to lactic acidosis. This presentation is likely due to liver disease, sepsis and necrotizing pancreatitis.    This is a 30 year old man with severe alcoholic hepatiitis complicated by necrotizing pancreatitis, renal failure requiring dialysis and sepsis.  Over the past 24 hours his lactic acids have consistently been ~4.    He denies any new concern.  He is satting well on 2lpm O2.  He does not have new pains.  His blood pressure is stable.    Plan   -  I believe his lactic acid is elevated secondary to multiorgan system failure.  I note a series of lactic acid checks today, all ~4.  We are limited in our ability to give fluids secondary to renal failure and he is already on appropriate antibiotics.  We have very little we can do to affect his lactic acid in a meaningful way.  I recommend stopping serial lactic acid checks and treating the patient based on vital signs and clinical status.  This recommendation was communicated to the [facility] team.  I think a lactic acid recheck would be reasonable with clinical deterioration but otherwise will lead to Mr Villareal being woken up repeatedly tonight for a series of rapid responses.  -  The Internal Medicine primary team was able to be reached and they are in agreement with the above plan.  -  Disposition: The patient will remain on the current unit. We will continue to monitor this patient closely.  -  Reassessment and plan follow-up will be performed by the rapid response team    SOFIA Christianson [facility] Job Code Contact #[phone]    Hospital Course   Brief Summary of events leading to rapid response:   See above    Admission Diagnosis:   Pancreatitis [K85.90]     Physical Exam   Temp: 97.7  F (36.5  C) Temp  Min: 97.5  F (36.4  C)  Max: 98.8  F (37.1  C)  Resp: 20 Resp  Min: 16  Max: 20  SpO2: 97 % SpO2  Min: 97 %  Max: 100 %  Pulse: 120 Pulse  Min: 116   Max: 127    No data recorded  BP: 119/51 Systolic (24hrs), Av , Min:106 , Max:121   Diastolic (24hrs), Av, Min:50, Max:65     I/Os: I/O last 3 completed shifts:  In: 420 [P.O.:50; Other:200; NG/GT:170]  Out: 2000 [Emesis/NG output:650; Drains:1350]     Exam:   General: acutely ill appearing  Mental Status: AAOx4.  Ill appearing young man lying in bed, HR regular but tachycardic.    Significant Results and Procedures   Lactic Acid:   Recent Labs   Lab Test 21  2052 21  1337 21  0935 21  2215 21  2116 21  2106 21  2106   LACT 4.0* 4.2* 4.3*   < >  --   --   --    LACTS  --   --   --   --  1.1 0.9 1.5    < > = values in this interval not displayed.     CBC:   Recent Labs   Lab Test 21  0752 21  0537 21  0355   WBC 18.2* 15.1* 13.4*   HGB 8.7* 8.6* 8.3*   HCT 26.9* 25.5* 25.3*   * 151 171        Sepsis Evaluation   The patient is known to have an infection.  NO EVIDENCE OF SEPSIS at this time.  Vital sign, physical exam, and lab findings are due to nec panc, liver disease and known infection.

## 2021-07-31 NOTE — PROGRESS NOTES
"CLINICAL NUTRITION SERVICES - BRIEF NOTE      Reason for RD note: Provider request for resuming continuous feeds per pt preference. Cycled regimen is not working for him. Pt noted to be refusing cares and skipping TF.    New Findings/Chart Review:  Nutrition support:   Vital 1.5 Lneo @ goal of  75ml/hr x 16 hours(4pm-8am) + 4 packets Prosource  (1200ml/day)  will provide: 1960 kcals (29 kcal/kg), 125 g PRO (1.8 g/kg), 916 ml free H20, 224 g CHO, 7 g fiber and 69 g fat daily.   -Very inconsistent and poor intake of TF - last had 75 mL earlier today, 0 mL 7/30, 140 mL 7/29, 140 mL 7/29, 420 mL 7/27, 420 mL 7/26, 280 mL 7/25  -On enzymes d/t low fecal elastase    Enteral Access: PEG/J - feeds via J-tube    Oral Diet/Intake: Regular diet + 1200 mL FR + Ensure Clear PRN - minimal PO recorded in RN flowsheets (0-25% over past week)    Labs: Reviewed, no Mg++/Phos     Meds: Reviewed, notable for: 2 caps Oral Creon 24 with meals, PRN enteral Creon/sodium bicarb for cycled feeds, thiamine (which should reduce refeeding risk)    Interventions:  1. Discussed with provider who reports pt is wanting to try continuous feeds again. Cycled feeds are \"too much to track\" per pt.     2. Ordered continuous feeds: Vital 1.5 Leno @ goal of  65ml/hr (1560ml/day)  will provide: 2340 kcals (35 kcal/kg), 105 g PRO (1.6 g pro/kg), 1191 ml free H20, 291 g CHO, and 9 g fiber daily, 89 g fat, 88 mEq K+, 1951 mg Po4   -Given very poor TF intake over past week (refeeding risk), start @ 25 mL/hr and adv by 10 mL q8h to goal pending tolerance and K+ >3.0, Phos >1.9.   -Ordered baseline and days 2 & 3 Mg++ and Phos labs given refeeding risk    3. PERT with continuous feeds:  A) Sodium Bicarb tablet (325 mg), 1 tablet q 4 hrs via Jtube. Administration Instructions: Crush 1 tablet and mix into 15 ml of warm water and use this solution to mix with Creon pancreatic enzymes. DO NOT administer directly into Jtube (to be mixed into TF formula with Creon " enzyme - see Creon enzyme order)   B) Creon 24, 1- 2 capsules q 4 hrs via Jtube. Administration Instructions:   --If TF rate is running @ 10-25 ml/hr, administer 1 capsule q 4 hrs;   --If TF rate is running @ 30-65 ml/hr, increase to 2 capsules q 4 hrs.    **Open capsule and empty contents into 15 ml sodium bicarb solution (see sodium bicarb order), let dissolve for about 20-30 minutes and then add this solution to the amount of TF formula hung in TF bag every 4 hrs (i.e., once TF @ goal infusion 65 ml/hr will mix 2 capsules into 260 ml of TF formula every 4 hrs).   *Note: this enzyme regimen with TF @ goal infusion will provide approx 3236 units of lipase/gram of total Fat daily and approp dosing initially for pancreatic insufficiency with more elemental TF formula.     Future/Additional Recommendations:  Monitor acceptance of continuous TF regimen.    Nutrition will continue to follow per protocol.    Margy Mendoza RD, LD  Weekend Pager: [phone]

## 2021-07-31 NOTE — PLAN OF CARE
Time of notification: 9:49 PM  Provider notified: BUCKY Lozada MD  Patient status:    Lactic trigger of 4.0--RRT      Temp:  [97.5  F (36.4  C)-98.8  F (37.1  C)] 98.6  F (37  C)  Pulse:  [116-127] 120  Resp:  [16-20] 18  BP: (106-121)/(50-65) 110/55  SpO2:  [97 %-100 %] 99 %       Orders received: vitals unchanged from last, pt alert and oriented, will monitor.

## 2021-07-31 NOTE — PLAN OF CARE
"Neuro: A&Ox4. Refusing to take certain oral medications, states \"it makes me nauseous and feels like pills get stuck in my throat.\" offered crushing medicine and placing in applesauce. Pt declined for my shift.  Cardiac: sinus tachycardia in 120s. Unchanged from previous shift. Pt asymptomatic. -120s,.   Respiratory: saturations maintain above 95%. Weaned pt from 4L to 2L. Pt anxious about weaning O2, stating \"I need more oxygen because it feels better\". Some minor abdominal breathing noted. Pt endorses no SOB. Reeducated pt about the plan to wean O2.  GI/: refusing PEG tube cares. This writer attempted to educate the pt about the importance of keeping the PEG tube site clean. Pt still declined cares. Pt declining TF. FWF still given. Pt states \"TF makes me poop too much and makes my stomach hurt more.\" This writer stated the use of the medication Creon\" that the pt takes to help digest TF will help. Pt denies nausea.   Diet/appetite: Tolerating reg diet. Pt taking sips of water this shift.  Activity:  turning self in bed. Not oob this shift. Pt needs help w/ boosts and major turns. Pt's hand strength is weak- needs assistance opening lids.  Pain: At acceptable level on current regimen. Pt on PRN oral dilaudid. PRN hydroxyzine given as well.  Skin: No new deficits noted. Noted dried drainage and old gauze at g-tube site- pt refusing PEG tube cleansing  LDA's: PICC    Plan: Continue with POC. Notify primary team with changes.   "

## 2021-08-01 LAB
ALBUMIN SERPL-MCNC: 2 G/DL (ref 3.4–5)
ALP SERPL-CCNC: 122 U/L (ref 40–150)
ALT SERPL W P-5'-P-CCNC: 15 U/L (ref 0–70)
ANION GAP SERPL CALCULATED.3IONS-SCNC: 9 MMOL/L (ref 3–14)
AST SERPL W P-5'-P-CCNC: 28 U/L (ref 0–45)
BILIRUB SERPL-MCNC: 4.2 MG/DL (ref 0.2–1.3)
BUN SERPL-MCNC: 22 MG/DL (ref 7–30)
CALCIUM SERPL-MCNC: 8.2 MG/DL (ref 8.5–10.1)
CHLORIDE BLD-SCNC: 97 MMOL/L (ref 94–109)
CO2 SERPL-SCNC: 28 MMOL/L (ref 20–32)
CREAT SERPL-MCNC: 2.68 MG/DL (ref 0.66–1.25)
ERYTHROCYTE [DISTWIDTH] IN BLOOD BY AUTOMATED COUNT: 16 % (ref 10–15)
GFR SERPL CREATININE-BSD FRML MDRD: 30 ML/MIN/1.73M2
GLUCOSE BLD-MCNC: 131 MG/DL (ref 70–99)
GLUCOSE BLDC GLUCOMTR-MCNC: 159 MG/DL (ref 70–99)
HCT VFR BLD AUTO: 25 % (ref 40–53)
HGB BLD-MCNC: 8.1 G/DL (ref 13.3–17.7)
INR PPP: 1.69 (ref 0.85–1.15)
LACTATE SERPL-SCNC: 1.9 MMOL/L (ref 0.7–2)
MAGNESIUM SERPL-MCNC: 1.7 MG/DL (ref 1.6–2.3)
MCH RBC QN AUTO: 30.1 PG (ref 26.5–33)
MCHC RBC AUTO-ENTMCNC: 32.4 G/DL (ref 31.5–36.5)
MCV RBC AUTO: 93 FL (ref 78–100)
PHOSPHATE SERPL-MCNC: 2.8 MG/DL (ref 2.5–4.5)
PLATELET # BLD AUTO: 98 10E3/UL (ref 150–450)
POTASSIUM BLD-SCNC: 3 MMOL/L (ref 3.4–5.3)
PROT SERPL-MCNC: 7.3 G/DL (ref 6.8–8.8)
RBC # BLD AUTO: 2.69 10E6/UL (ref 4.4–5.9)
SODIUM SERPL-SCNC: 134 MMOL/L (ref 133–144)
WBC # BLD AUTO: 14 10E3/UL (ref 4–11)

## 2021-08-01 PROCEDURE — 84100 ASSAY OF PHOSPHORUS: CPT | Performed by: STUDENT IN AN ORGANIZED HEALTH CARE EDUCATION/TRAINING PROGRAM

## 2021-08-01 PROCEDURE — 99207 PR CDG-CUT & PASTE-POTENTIAL IMPACT ON LEVEL: CPT | Performed by: STUDENT IN AN ORGANIZED HEALTH CARE EDUCATION/TRAINING PROGRAM

## 2021-08-01 PROCEDURE — 250N000009 HC RX 250: Performed by: INTERNAL MEDICINE

## 2021-08-01 PROCEDURE — 83735 ASSAY OF MAGNESIUM: CPT | Performed by: STUDENT IN AN ORGANIZED HEALTH CARE EDUCATION/TRAINING PROGRAM

## 2021-08-01 PROCEDURE — 250N000011 HC RX IP 250 OP 636: Performed by: INTERNAL MEDICINE

## 2021-08-01 PROCEDURE — 250N000013 HC RX MED GY IP 250 OP 250 PS 637: Performed by: INTERNAL MEDICINE

## 2021-08-01 PROCEDURE — 85610 PROTHROMBIN TIME: CPT | Performed by: STUDENT IN AN ORGANIZED HEALTH CARE EDUCATION/TRAINING PROGRAM

## 2021-08-01 PROCEDURE — 83605 ASSAY OF LACTIC ACID: CPT | Performed by: INTERNAL MEDICINE

## 2021-08-01 PROCEDURE — 80053 COMPREHEN METABOLIC PANEL: CPT | Performed by: STUDENT IN AN ORGANIZED HEALTH CARE EDUCATION/TRAINING PROGRAM

## 2021-08-01 PROCEDURE — 250N000013 HC RX MED GY IP 250 OP 250 PS 637: Performed by: STUDENT IN AN ORGANIZED HEALTH CARE EDUCATION/TRAINING PROGRAM

## 2021-08-01 PROCEDURE — 120N000003 HC R&B IMCU UMMC

## 2021-08-01 PROCEDURE — 36592 COLLECT BLOOD FROM PICC: CPT | Performed by: INTERNAL MEDICINE

## 2021-08-01 PROCEDURE — 250N000011 HC RX IP 250 OP 636

## 2021-08-01 PROCEDURE — 99232 SBSQ HOSP IP/OBS MODERATE 35: CPT | Mod: GC | Performed by: STUDENT IN AN ORGANIZED HEALTH CARE EDUCATION/TRAINING PROGRAM

## 2021-08-01 PROCEDURE — 85027 COMPLETE CBC AUTOMATED: CPT | Performed by: STUDENT IN AN ORGANIZED HEALTH CARE EDUCATION/TRAINING PROGRAM

## 2021-08-01 RX ORDER — POTASSIUM CHLORIDE 7.45 MG/ML
10 INJECTION INTRAVENOUS ONCE
Status: COMPLETED | OUTPATIENT
Start: 2021-08-01 | End: 2021-08-01

## 2021-08-01 RX ORDER — MAGNESIUM HYDROXIDE 1200 MG/15ML
LIQUID ORAL
Status: DISCONTINUED
Start: 2021-08-01 | End: 2021-08-02 | Stop reason: HOSPADM

## 2021-08-01 RX ORDER — MAGNESIUM SULFATE HEPTAHYDRATE 40 MG/ML
2 INJECTION, SOLUTION INTRAVENOUS ONCE
Status: COMPLETED | OUTPATIENT
Start: 2021-08-01 | End: 2021-08-01

## 2021-08-01 RX ADMIN — SODIUM BICARBONATE 325 MG: 325 TABLET ORAL at 01:47

## 2021-08-01 RX ADMIN — PIPERACILLIN SODIUM AND TAZOBACTAM SODIUM 2.25 G: 2; .25 INJECTION, POWDER, LYOPHILIZED, FOR SOLUTION INTRAVENOUS at 00:35

## 2021-08-01 RX ADMIN — HYDROMORPHONE HYDROCHLORIDE 2 MG: 1 SOLUTION ORAL at 16:06

## 2021-08-01 RX ADMIN — POTASSIUM CHLORIDE 10 MEQ: 7.46 INJECTION, SOLUTION INTRAVENOUS at 17:19

## 2021-08-01 RX ADMIN — MAGNESIUM SULFATE HEPTAHYDRATE 2 G: 40 INJECTION, SOLUTION INTRAVENOUS at 18:07

## 2021-08-01 RX ADMIN — Medication 5 ML: at 09:23

## 2021-08-01 RX ADMIN — PANCRELIPASE 1 CAPSULE: 24000; 76000; 120000 CAPSULE, DELAYED RELEASE PELLETS ORAL at 10:35

## 2021-08-01 RX ADMIN — Medication 550 MG: at 21:13

## 2021-08-01 RX ADMIN — ONDANSETRON 4 MG: 2 INJECTION INTRAMUSCULAR; INTRAVENOUS at 12:03

## 2021-08-01 RX ADMIN — THIAMINE HCL TAB 100 MG 100 MG: 100 TAB at 09:22

## 2021-08-01 RX ADMIN — HYDROXYZINE HYDROCHLORIDE 25 MG: 25 TABLET, FILM COATED ORAL at 16:05

## 2021-08-01 RX ADMIN — LACTULOSE 20 G: 10 SOLUTION ORAL; RECTAL at 09:22

## 2021-08-01 RX ADMIN — PIPERACILLIN SODIUM AND TAZOBACTAM SODIUM 2.25 G: 2; .25 INJECTION, POWDER, LYOPHILIZED, FOR SOLUTION INTRAVENOUS at 05:15

## 2021-08-01 RX ADMIN — PANCRELIPASE 1 CAPSULE: 24000; 76000; 120000 CAPSULE, DELAYED RELEASE PELLETS ORAL at 18:47

## 2021-08-01 RX ADMIN — PIPERACILLIN SODIUM AND TAZOBACTAM SODIUM 2.25 G: 2; .25 INJECTION, POWDER, LYOPHILIZED, FOR SOLUTION INTRAVENOUS at 18:46

## 2021-08-01 RX ADMIN — POTASSIUM CHLORIDE 10 MEQ: 7.46 INJECTION, SOLUTION INTRAVENOUS at 16:06

## 2021-08-01 RX ADMIN — PIPERACILLIN SODIUM AND TAZOBACTAM SODIUM 2.25 G: 2; .25 INJECTION, POWDER, LYOPHILIZED, FOR SOLUTION INTRAVENOUS at 11:59

## 2021-08-01 RX ADMIN — LACTULOSE 20 G: 10 SOLUTION ORAL; RECTAL at 16:05

## 2021-08-01 RX ADMIN — MELATONIN TAB 3 MG 6 MG: 3 TAB at 21:13

## 2021-08-01 RX ADMIN — SODIUM BICARBONATE 325 MG: 325 TABLET ORAL at 05:26

## 2021-08-01 RX ADMIN — HYDROMORPHONE HYDROCHLORIDE 2 MG: 1 SOLUTION ORAL at 09:30

## 2021-08-01 RX ADMIN — SODIUM BICARBONATE 325 MG: 325 TABLET ORAL at 18:47

## 2021-08-01 RX ADMIN — ONDANSETRON 4 MG: 2 INJECTION INTRAMUSCULAR; INTRAVENOUS at 22:04

## 2021-08-01 RX ADMIN — ONDANSETRON 4 MG: 2 INJECTION INTRAMUSCULAR; INTRAVENOUS at 03:42

## 2021-08-01 RX ADMIN — LACTULOSE 20 G: 10 SOLUTION ORAL; RECTAL at 11:59

## 2021-08-01 RX ADMIN — PANCRELIPASE 2 CAPSULE: 24000; 76000; 120000 CAPSULE, DELAYED RELEASE PELLETS ORAL at 01:47

## 2021-08-01 RX ADMIN — SODIUM BICARBONATE 325 MG: 325 TABLET ORAL at 21:13

## 2021-08-01 RX ADMIN — HYDROXYZINE HYDROCHLORIDE 25 MG: 25 TABLET, FILM COATED ORAL at 22:04

## 2021-08-01 RX ADMIN — PANCRELIPASE 1 CAPSULE: 24000; 76000; 120000 CAPSULE, DELAYED RELEASE PELLETS ORAL at 21:14

## 2021-08-01 RX ADMIN — HYDROMORPHONE HYDROCHLORIDE 2 MG: 1 SOLUTION ORAL at 03:46

## 2021-08-01 RX ADMIN — FOLIC ACID 1 MG: 1 TABLET ORAL at 09:22

## 2021-08-01 RX ADMIN — SODIUM BICARBONATE 325 MG: 325 TABLET ORAL at 10:34

## 2021-08-01 RX ADMIN — Medication 550 MG: at 09:29

## 2021-08-01 RX ADMIN — PANCRELIPASE 2 CAPSULE: 24000; 76000; 120000 CAPSULE, DELAYED RELEASE PELLETS ORAL at 09:24

## 2021-08-01 RX ADMIN — PANCRELIPASE 1 CAPSULE: 24000; 76000; 120000 CAPSULE, DELAYED RELEASE PELLETS ORAL at 05:26

## 2021-08-01 RX ADMIN — PANCRELIPASE 2 CAPSULE: 24000; 76000; 120000 CAPSULE, DELAYED RELEASE PELLETS ORAL at 12:00

## 2021-08-01 RX ADMIN — Medication 40 MG: at 09:23

## 2021-08-01 RX ADMIN — HYDROMORPHONE HYDROCHLORIDE 2 MG: 1 SOLUTION ORAL at 21:14

## 2021-08-01 ASSESSMENT — ACTIVITIES OF DAILY LIVING (ADL)
ADLS_ACUITY_SCORE: 20
ADLS_ACUITY_SCORE: 22
ADLS_ACUITY_SCORE: 20
ADLS_ACUITY_SCORE: 22
ADLS_ACUITY_SCORE: 20
ADLS_ACUITY_SCORE: 22

## 2021-08-01 ASSESSMENT — MIFFLIN-ST. JEOR: SCORE: 1617.5

## 2021-08-01 NOTE — PLAN OF CARE
Neuro: A&Ox4. Lethargic. Alert. Follows commands. Q4 Neuro checks  Cardiac: ST Rates 100-120s, been baseline. -120s/60s    Respiratory: Sating >95% on 3L NC. RR 16-22. ABD muscle use. Denies SOB  GI/: Adequate urine output. BM X1  Diet/appetite: Regular Diet. G/J TF through J Tube. Tfs started at 0130, pt refused prior to this time.TF's attempted to start at 65mL/HR. Pt tolerated for ~2 hours and had emesis x1. Zofran given w/ complete relief. G-tube drained. TF's restarted per MD at 25mL/Hr will Advance as tolerated, 10mL Q8 Hr w/ 30mL FWF Q4. G-tube clamped during TF's.   Activity:  Lift Assist   Pain: At acceptable level on current regimen.   Skin: Pulsate bed, blanchable redness to coccyx, Mepilex applied. L Flank Drain to gravity.   LDA's: PICC, R IJ  Repeat Lactic 1.9  Plan: Continue with POC. Notify primary team with changes.

## 2021-08-01 NOTE — PLAN OF CARE
Shift [id number]  Neuro: A&Ox4, follows commands, moves ext, PERRLA 3 brisk  Cardiac: ST. VSS  Resp: 3L NC 02 sat >94%  GI/: Reg diet with tube feeds through G/J Tube. Refused TF. Anuria due to HD, 1 BM  Pain: Abdominal tenderness, received Dilaudid x1  Skin: No new deficits noted  LDA's: L PICC, R CVCC. Gtube open to gravity, Jtube Clamped, L Drain.     Plan: Continue with POC, notify primary team with changes

## 2021-08-01 NOTE — PROGRESS NOTES
[facility]    Progress Note - [place] 3 Service        Date of Admission:  6/28/2021    Assessment & Plan   Dax Villareal is a 30 year old male with hx of alcohol use disorder admitted on 6/28/2021 after recent prolonged admission at [facility] in [place] who has severe acute alcoholic hepatitis c/b HE, ESRD requiring HD, and malnutrition in the setting of acute necrotizing pancreatitis, s/p PEG-J placement on 7/13. He arrived with acute respiratory failure and septic shock that have resolved, and his secondary bacterial peritonitis has been appropriately treated. He requires continued management for ESRD, hepatic encephalopathy, infected pancreatic pseudocyst, nutritional support via PEG-J, and persistent right pleural effusion.     Today:  - continuous tube feeds 25 ml/hr --> advance to 35 ml/hr this AM with plan for slow advancement as the patient can tolerate     # Necrotizing pancreatitis w/ infected pseudocyst s/p RP Drain and Cystogastrostomy, improving  # Septic Shock, resolved   # Bacterial Peritonitis 2/2 infected pseudocyst, resolved  # Leukocytosis, stable  # Peripancreatic fluid collection growing VRE   Etiology of the septic shock that he came in with likely due to necrotizing pancreatitis, infected pseudocyst, and peritonitis. Had percutaneous RP drain placed for drainage of pancreatic collection. Diagnostic para returned with peritonitis. Per discussion with ID, ongoing leukocytosis is not unexpected with nec panc. CT 7/18 showed unchanged moderate ascites and collection size. Cystgastrostomy on 7/21 with therapeutic para.      CT 7/27 showed pancreatic stent partially in adjacent mesentery and displaced gastrostomy balloon, GI replaced stent on 7/28. CT also showed mild peritoneal thickening, unable to rule out ongoing peritonitis, though patient has clinically been afebrile, no significant changes in abdominal exam.     CT 7/30 showed slightly decreased  size of necrotic collection s/p stent replacement, adequate positioning of GJ balloon.   - Continue Zosyn through endoscopic debridement  - Repeat endoscopic necrosectomy pushed, likely next week  - PRN paracentesis  - Daily CBC  - MAP goal > 65  - GI following, appreciate recs               - Pancreatic enzymes    - For back pain: Tylenol PRN and Dilaudid 2mg q3h PRN  - Blood cultures NGTD     # Lactic acidosis, recurrent  Recurrent episodes of lactic acidosis triggering rapid responses. Generally fluid responsive, although have been cautious given patient's delicate fluid state. Ongoing antibiotics w/ IV Zosyn (from ceftriaxone prior). Suspect lactic acidosis is multifactorial, r/t fluid removal from dialysis and fluid loss from emesis as well as ongoing liver disease/pancreatitis.     Pt triggered sepsis again 7/30 after fluid removal (1.5 L off in dialysis, low PO/TF intake, increased G tube output, etc.) Lactic still elevated at 4.0 s/p 3x 500 mL bolus and albumin. GI consulted, no acute concerns/interventions.     - Lactic improved s/p HD yesterday  - Continue Zosyn  - Close monitoring of fluid status     # Right hepatic hydrothorax  Intermittently increased subjective shortness of breath. CXR on 7/19 w/ increased RLL haziness; ddx includes pleural effusion v pneumonia v atelectasis. CT on 7/28 showed large volume R hepatic hydrothorax with complete atelectasis of right lung, bedside thora completed 7/28 with labs and cultures.     CXR 7/30 showed moderate R pleural effusion, unchanged from previous and improved aeration of right lung with small remaining pleural effusion on CT. Thora cultures negative.     - IR completed thoracentesis 7/28 - exudative  - ID following: agree to continue with empiric abx, though pleural effusion most likely 2/2 hepatic hydrothorax and pancreatitis. Deferred to pulm team regarding decision for further drainage/chest tube placement.     # Severe malnutrition in setting of chronic  illness  PEG-J placed 7/13. Procedure went well. On 7/29, Nutrition increased TFs to goal of 75 mL/hr x16hr (4pm-8am). Switched to continuous TF 7/31 at slower rate to see if this was more tolerable for patient (pt had emesis yesterday w/ TF at too high of rate, but rate now decreased and pt is tolerating)     - Dietitian consult for new tube feeds, appreciate recs   - Continuous TF per dietician order (advancing slowly)              - Cont panc enzymes  - Regular Diet, thin liquids per SLP  - Continue multivitamins  - Pantoprazole 40 mg IV daily  - Zofran PRN     # Alcoholic hepatitis complicated by portal hypertension, ascites, and HE - improving   # Hx of Alcohol use disorder c/b withdrawal   # Anemia  # Coagulopathy   Patient with a history of marked alcohol use and alcoholic hepatitis in the past. Was given a course of steroids (1 week) during previous admission at OSH. Patient mental status has been stable, alert and oriented x4 for a couple weeks. In early-mid July, patient consistently had 1-2 pRBC infusions weekly d/t Hgb drops of 0.3-0.5 every day (etilogy likely chronic liver disease, chronic kidney disease, phlebotomy, dilution, poor nutrition, ect). Hgb stable around 8.3-8.6 for several days.      - Lactulose 20g QID and PRN and rifaximin 550mg BID - as patient allows  - Trend MELD labs  - Daily folic acid and thiamine  - Delirium precautions  - Melatonin 6mg at bedtime  - Daily CBC   - Transfuse if hemoglobin < 7, platelet < 10 or < 30 with bleeding  - Continue to monitor for signs of bleeding     MELD-Na score: 31 at 7/31/2021  6:03 AM  MELD score: 30 at 7/31/2021  6:03 AM  Calculated from:  Serum Creatinine: 3.35 mg/dL at 7/31/2021  6:03 AM  Serum Sodium: 133 mmol/L at 7/31/2021  6:03 AM  Total Bilirubin: 4.6 mg/dL at 7/31/2021  6:03 AM  INR(ratio): 1.73 at 7/31/2021  6:03 AM  Age: 30 years     # ESRD on HD (MWF)  # Hypervolemia causing acute respiratory failure, resolved  Nephrology note 7/21  indicates that patient has been on renal replacement therapy for two months and pt status has most likely transitioned from ARF to ESRD at this point.          > Hemodialysis as per renal reccs              - Midodrine 10 mg PRN before dialysis sessions     > Daily chemistry labs, strict I/O, and daily weights  - Aspiration precautions  - Supplemental oxygen as needed to keep sats above 89%    # Apathy, concern for depression  Patient is intermittently refusing cares due to wanting a 'break,' but also reaffirms that he wants everything done to prolong his life. Mood is depressed, apathetic at times. Patient has multiple stressors including his own medical conditions and family health issues.  - Pt reported significant fatigue today; did not want to discuss possible psychiatry consult. Will consider psychiatry consult in the future if the patient is agreeable.     # Goals of Care  Please see additional interval note for in depth details regarding conversation on 7/9 about goals of care. Patient's long term prognosis is still quite poor given his multi organ failure. Care conference on 7/15, pt did not wish to participate. Met with sistermargareth Cheney (on phone) and Melyssa and close friend Leroy with palliative, nephrology and general medicine teams today to discuss Dax's current improving status, poor prognosis and wishes to continue full cares. *Tentative* Plan to meet Fridays at 4 pm with teams and family for weekly updates.     - Hepatology confirmed pt will not be a transplant candidate  - Daily phone updates to sister Noni ([phone])     Diet: Snacks/Supplements Adult: Ensure Clear; With Meals  Fluid restriction 1200 ML FLUID  Regular Diet Adult  Adult Formula Drip Feeding: Continuous Vital 1.5; Jejunostomy; Goal Rate: 65; mL/hr; Medication - Feeding Tube Flush Frequency: At least 15-30 mL water before and after medication administration and with tube clogging; Amount to Send (Nutrition us...    DVT Prophylaxis:  Pneumatic Compression Devices  Rdz Catheter: Not present  Fluids: 10 mL NS TKO  Central Lines: PRESENT  PICC Triple Lumen 06/28/21 Left-Site Assessment: WDL  CVC Double Lumen Right-Site Assessment: WDL  Code Status: Full Code      Disposition Plan   Expected discharge: >7 days, recommended to transitional care unit once   nutrition improved, infected pancreatic pseudocyst is well managed and plan for dialysis is in place .     The patient's care was discussed with the Attending Physician, Dr. Taylor .    Jaime Amin, MS3  Medical Student  [address]  Securely message with the Vocera Web Console (learn more here)  Text page via [employer] Paging/Directory  Please see sign in/sign out for up to date coverage information  _____________________________________________________________________    Interval History   - HD yesterday w/ 1.5 L off  - Switched to continuous slow TF yesterday to see if this was more agreeable to pt; TF were started at too high a rate overnight (75 ml/hr) and pt had emesis x1. TF's were slowed to 25 ml/hr. Plan to advance to 35 ml/hr shortly.  - Pt defers psychiatry consult today; states that he is too fatigued.      Data reviewed today: I reviewed all medications, new labs and imaging results over the last 24 hours.    Physical Exam   Vital Signs: Temp: 98.5  F (36.9  C) Temp src: Oral BP: 110/53 Pulse: 110   Resp: 20 SpO2: 100 % O2 Device: Nasal cannula Oxygen Delivery: 3 LPM  Weight: 158 lbs 11.7 oz  Gen: Lying down in bed, alert, appears similar to yesterday.   HEENT: Conjunctival icterus  CV: Tachycardic. Regular rhythm.   Resp: Left lung CTA, moderately diminished lung sounds over right lung, especially lower lobe. No wheezing. Coughing intermittently throughout exam, no significant increase in WOB.   Abd: Refused palpation today, active bowel sounds. PEG J in place without surrounding erythema. Abdomen less distended than  last week.  Skin: Jaundiced.  Ext: Well perfused, no edema  Neuro: Oriented x3, responding appropriately to questions. No focal deficits. Tremulous.    Data   Recent Labs   Lab 08/01/21  0315 07/31/21  0603 07/30/21  0752 07/29/21  0537   WBC  --  15.2* 18.2* 15.1*   HGB  --  8.2* 8.7* 8.6*   MCV  --  96 93 92   PLT  --  124* 148* 151   INR  --  1.73* 1.62* 1.86*   NA  --  133 136 134   POTASSIUM  --  4.1 3.1* 3.3*   CHLORIDE  --  100 103 100   CO2  --  21 23 21   BUN  --  34* 25 40*   CR  --  3.35* 2.49* 3.79*   ANIONGAP  --  12 10 13   JANENE  --  8.9 8.6 8.8   * 68* 100* 152*   ALBUMIN  --  1.9* 1.8* 1.8*   PROTTOTAL  --  6.6* 7.0 7.1   BILITOTAL  --  4.6* 4.4* 3.8*   ALKPHOS  --  111 129 133   ALT  --  14 16 12   AST  --  38 46* 33     No results found for this or any previous visit (from the past 24 hour(s)).

## 2021-08-01 NOTE — PLAN OF CARE
Neuro: A&Ox4. Lethargic, more alert this afternoon. Refusing many nursing cares (repositioning, hygiene).   Cardiac: -120's. VSS. Afebrile.    Respiratory: Sating 90's on 3-5L NC, RODNEY.  GI/: Anuric, on HD T Th Sat. BM X3.   Diet/appetite: Regular diet ordered - not eating. TF advanced to 35 mL/hr @ 12 PM, pt started to c/o nausea 30-45 min after. RN educated pt that keeping his HOB at 30 degrees may help his nausea, pt refused, HOB lowered. Standard FWF.   Activity:  Assist of 1 to reposition. Assist of 2 w/ lift OOB.   Pain: At acceptable level on current regimen.   Skin: No new deficits noted.  LDA's: L TL PICC. R subclavian HD line. G clamped. J w/ TF/water/meds. L pancreas drain.     Plan: Continue with POC. Notify primary team with changes.

## 2021-08-01 NOTE — PROVIDER NOTIFICATION
[id number] [facility] Intern Notified Re:  -Pt vomited x1 w/TF running at 65mL/Hr.   -MD orders to restart TF's at 25mL/Hr

## 2021-08-01 NOTE — PLAN OF CARE
Neuro: A&Ox4. Calls as needed. Pt is refusing cares tonight via changing dressings, mouth cares and general washing up.  Cardiac: ST. VSS.   Respiratory: Sating 94% on 2 LPM via NC  GI/: Pt is on HD through rt CVC line.  Makes urine very infrequently.   No Bm tonight.  Diet/appetite: Tolerating regular diet. Eating well.  Pt is currently suppose to be on TF but is refusing.  Activity:  Assist of 2 to turn and reposition.  Pain: At acceptable level on current regimen.   Skin: No new deficits noted.  LDA's:PICC and CVC and peg tube, left drain tube. G port is open to gravity.  Med through J tube.    Plan: Continue with POC. Notify primary team with changes.

## 2021-08-02 ENCOUNTER — APPOINTMENT (OUTPATIENT)
Dept: GENERAL RADIOLOGY | Facility: CLINIC | Age: 31
End: 2021-08-02
Attending: INTERNAL MEDICINE
Payer: MEDICAID

## 2021-08-02 LAB
ALBUMIN SERPL-MCNC: 1.8 G/DL (ref 3.4–5)
ALP SERPL-CCNC: 118 U/L (ref 40–150)
ALT SERPL W P-5'-P-CCNC: 14 U/L (ref 0–70)
ANION GAP SERPL CALCULATED.3IONS-SCNC: 9 MMOL/L (ref 3–14)
APPEARANCE FLD: ABNORMAL
AST SERPL W P-5'-P-CCNC: 26 U/L (ref 0–45)
ATRIAL RATE - MUSE: 120 BPM
BACTERIA BLD CULT: NO GROWTH
BACTERIA BLD CULT: NO GROWTH
BACTERIA PLR CULT: NO GROWTH
BASOPHILS # BLD MANUAL: 0 10E3/UL (ref 0–0.2)
BASOPHILS # BLD MANUAL: 0 10E3/UL (ref 0–0.2)
BASOPHILS NFR BLD MANUAL: 0 %
BASOPHILS NFR BLD MANUAL: 0 %
BILIRUB SERPL-MCNC: 3.7 MG/DL (ref 0.2–1.3)
BUN SERPL-MCNC: 26 MG/DL (ref 7–30)
CALCIUM SERPL-MCNC: 8.3 MG/DL (ref 8.5–10.1)
CHLORIDE BLD-SCNC: 96 MMOL/L (ref 94–109)
CO2 SERPL-SCNC: 26 MMOL/L (ref 20–32)
COLOR FLD: YELLOW
CREAT SERPL-MCNC: 3.21 MG/DL (ref 0.66–1.25)
CRP SERPL-MCNC: 93 MG/L (ref 0–8)
DACRYOCYTES BLD QL SMEAR: SLIGHT
DIASTOLIC BLOOD PRESSURE - MUSE: NORMAL MMHG
EOSINOPHIL # BLD MANUAL: 0 10E3/UL (ref 0–0.7)
EOSINOPHIL # BLD MANUAL: 0.3 10E3/UL (ref 0–0.7)
EOSINOPHIL NFR BLD MANUAL: 0 %
EOSINOPHIL NFR BLD MANUAL: 2 %
ERYTHROCYTE [DISTWIDTH] IN BLOOD BY AUTOMATED COUNT: 15.8 % (ref 10–15)
ERYTHROCYTE [DISTWIDTH] IN BLOOD BY AUTOMATED COUNT: 15.9 % (ref 10–15)
ERYTHROCYTE [DISTWIDTH] IN BLOOD BY AUTOMATED COUNT: 15.9 % (ref 10–15)
FUNGUS SPEC CULT: NORMAL
GFR SERPL CREATININE-BSD FRML MDRD: 24 ML/MIN/1.73M2
GLUCOSE BLD-MCNC: 134 MG/DL (ref 70–99)
HCT VFR BLD AUTO: 24.8 % (ref 40–53)
HCT VFR BLD AUTO: 24.8 % (ref 40–53)
HCT VFR BLD AUTO: 25 % (ref 40–53)
HGB BLD-MCNC: 8.1 G/DL (ref 13.3–17.7)
INTERPRETATION ECG - MUSE: NORMAL
LACTATE SERPL-SCNC: 2.1 MMOL/L (ref 0.7–2)
LACTATE SERPL-SCNC: 2.1 MMOL/L (ref 0.7–2)
LYMPHOCYTES # BLD MANUAL: 0.8 10E3/UL (ref 0.8–5.3)
LYMPHOCYTES # BLD MANUAL: 1.3 10E3/UL (ref 0.8–5.3)
LYMPHOCYTES NFR BLD MANUAL: 5 %
LYMPHOCYTES NFR BLD MANUAL: 8 %
MAGNESIUM SERPL-MCNC: 2.4 MG/DL (ref 1.6–2.3)
MCH RBC QN AUTO: 29.7 PG (ref 26.5–33)
MCH RBC QN AUTO: 30.6 PG (ref 26.5–33)
MCH RBC QN AUTO: 30.6 PG (ref 26.5–33)
MCHC RBC AUTO-ENTMCNC: 32.4 G/DL (ref 31.5–36.5)
MCHC RBC AUTO-ENTMCNC: 32.7 G/DL (ref 31.5–36.5)
MCHC RBC AUTO-ENTMCNC: 32.7 G/DL (ref 31.5–36.5)
MCV RBC AUTO: 92 FL (ref 78–100)
MCV RBC AUTO: 94 FL (ref 78–100)
MCV RBC AUTO: 94 FL (ref 78–100)
MONOCYTES # BLD MANUAL: 2.2 10E3/UL (ref 0–1.3)
MONOCYTES # BLD MANUAL: 2.2 10E3/UL (ref 0–1.3)
MONOCYTES NFR BLD MANUAL: 14 %
MONOCYTES NFR BLD MANUAL: 14 %
MYELOCYTES # BLD MANUAL: 0.2 10E3/UL
MYELOCYTES # BLD MANUAL: 0.2 10E3/UL
MYELOCYTES NFR BLD MANUAL: 1 %
MYELOCYTES NFR BLD MANUAL: 1 %
NEUTROPHILS # BLD MANUAL: 11.9 10E3/UL (ref 1.6–8.3)
NEUTROPHILS # BLD MANUAL: 12 10E3/UL (ref 1.6–8.3)
NEUTROPHILS NFR BLD MANUAL: 76 %
NEUTROPHILS NFR BLD MANUAL: 77 %
P AXIS - MUSE: 25 DEGREES
PATH REV: ABNORMAL
PHOSPHATE SERPL-MCNC: 2.7 MG/DL (ref 2.5–4.5)
PLAT MORPH BLD: ABNORMAL
PLAT MORPH BLD: ABNORMAL
PLATELET # BLD AUTO: 110 10E3/UL (ref 150–450)
PLATELET # BLD AUTO: 110 10E3/UL (ref 150–450)
PLATELET # BLD AUTO: 125 10E3/UL (ref 150–450)
POTASSIUM BLD-SCNC: 3.3 MMOL/L (ref 3.4–5.3)
PR INTERVAL - MUSE: 148 MS
PROMYELOCYTES # BLD MANUAL: 0.2 10E3/UL
PROMYELOCYTES # BLD MANUAL: 0.2 10E3/UL
PROMYELOCYTES NFR BLD MANUAL: 1 %
PROMYELOCYTES NFR BLD MANUAL: 1 %
PROT SERPL-MCNC: 6.8 G/DL (ref 6.8–8.8)
QRS DURATION - MUSE: 92 MS
QT - MUSE: 348 MS
QTC - MUSE: 491 MS
R AXIS - MUSE: -16 DEGREES
RBC # BLD AUTO: 2.65 10E6/UL (ref 4.4–5.9)
RBC # BLD AUTO: 2.65 10E6/UL (ref 4.4–5.9)
RBC # BLD AUTO: 2.73 10E6/UL (ref 4.4–5.9)
RBC MORPH BLD: ABNORMAL
RBC MORPH BLD: ABNORMAL
RETICS # AUTO: 0.03 10E6/UL (ref 0.03–0.1)
RETICS/RBC NFR AUTO: 1.1 % (ref 0.5–2)
SODIUM SERPL-SCNC: 131 MMOL/L (ref 133–144)
SPECIMEN SOURCE: NORMAL
SPHEROCYTES BLD QL SMEAR: SLIGHT
SPHEROCYTES BLD QL SMEAR: SLIGHT
SYSTOLIC BLOOD PRESSURE - MUSE: NORMAL MMHG
T AXIS - MUSE: 30 DEGREES
VENTRICULAR RATE- MUSE: 120 BPM
WBC # BLD AUTO: 15.6 10E3/UL (ref 4–11)
WBC # BLD AUTO: 15.7 10E3/UL (ref 4–11)
WBC # BLD AUTO: 15.7 10E3/UL (ref 4–11)
WBC # FLD AUTO: 364 /UL

## 2021-08-02 PROCEDURE — 84100 ASSAY OF PHOSPHORUS: CPT | Performed by: STUDENT IN AN ORGANIZED HEALTH CARE EDUCATION/TRAINING PROGRAM

## 2021-08-02 PROCEDURE — 99233 SBSQ HOSP IP/OBS HIGH 50: CPT | Mod: 24 | Performed by: INTERNAL MEDICINE

## 2021-08-02 PROCEDURE — 250N000013 HC RX MED GY IP 250 OP 250 PS 637: Performed by: INTERNAL MEDICINE

## 2021-08-02 PROCEDURE — 250N000011 HC RX IP 250 OP 636: Performed by: INTERNAL MEDICINE

## 2021-08-02 PROCEDURE — 85045 AUTOMATED RETICULOCYTE COUNT: CPT | Performed by: STUDENT IN AN ORGANIZED HEALTH CARE EDUCATION/TRAINING PROGRAM

## 2021-08-02 PROCEDURE — 99233 SBSQ HOSP IP/OBS HIGH 50: CPT | Performed by: PHYSICIAN ASSISTANT

## 2021-08-02 PROCEDURE — 99233 SBSQ HOSP IP/OBS HIGH 50: CPT | Performed by: INTERNAL MEDICINE

## 2021-08-02 PROCEDURE — 86140 C-REACTIVE PROTEIN: CPT | Performed by: STUDENT IN AN ORGANIZED HEALTH CARE EDUCATION/TRAINING PROGRAM

## 2021-08-02 PROCEDURE — 250N000013 HC RX MED GY IP 250 OP 250 PS 637: Performed by: STUDENT IN AN ORGANIZED HEALTH CARE EDUCATION/TRAINING PROGRAM

## 2021-08-02 PROCEDURE — 120N000003 HC R&B IMCU UMMC

## 2021-08-02 PROCEDURE — 85018 HEMOGLOBIN: CPT | Performed by: STUDENT IN AN ORGANIZED HEALTH CARE EDUCATION/TRAINING PROGRAM

## 2021-08-02 PROCEDURE — 71045 X-RAY EXAM CHEST 1 VIEW: CPT

## 2021-08-02 PROCEDURE — 250N000009 HC RX 250

## 2021-08-02 PROCEDURE — 71045 X-RAY EXAM CHEST 1 VIEW: CPT | Mod: 26 | Performed by: RADIOLOGY

## 2021-08-02 PROCEDURE — 83605 ASSAY OF LACTIC ACID: CPT | Performed by: NURSE PRACTITIONER

## 2021-08-02 PROCEDURE — 83605 ASSAY OF LACTIC ACID: CPT

## 2021-08-02 PROCEDURE — 36592 COLLECT BLOOD FROM PICC: CPT | Performed by: STUDENT IN AN ORGANIZED HEALTH CARE EDUCATION/TRAINING PROGRAM

## 2021-08-02 PROCEDURE — 85027 COMPLETE CBC AUTOMATED: CPT | Performed by: STUDENT IN AN ORGANIZED HEALTH CARE EDUCATION/TRAINING PROGRAM

## 2021-08-02 PROCEDURE — 250N000009 HC RX 250: Performed by: INTERNAL MEDICINE

## 2021-08-02 PROCEDURE — 83735 ASSAY OF MAGNESIUM: CPT | Performed by: STUDENT IN AN ORGANIZED HEALTH CARE EDUCATION/TRAINING PROGRAM

## 2021-08-02 PROCEDURE — 36592 COLLECT BLOOD FROM PICC: CPT

## 2021-08-02 PROCEDURE — 82310 ASSAY OF CALCIUM: CPT | Performed by: STUDENT IN AN ORGANIZED HEALTH CARE EDUCATION/TRAINING PROGRAM

## 2021-08-02 PROCEDURE — 82947 ASSAY GLUCOSE BLOOD QUANT: CPT | Performed by: STUDENT IN AN ORGANIZED HEALTH CARE EDUCATION/TRAINING PROGRAM

## 2021-08-02 RX ORDER — POTASSIUM CHLORIDE 1.5 G/1.58G
20 POWDER, FOR SOLUTION ORAL ONCE
Status: COMPLETED | OUTPATIENT
Start: 2021-08-02 | End: 2021-08-02

## 2021-08-02 RX ORDER — MAGNESIUM HYDROXIDE 1200 MG/15ML
LIQUID ORAL
Status: COMPLETED
Start: 2021-08-02 | End: 2021-08-02

## 2021-08-02 RX ADMIN — HYDROXYZINE HYDROCHLORIDE 25 MG: 25 TABLET, FILM COATED ORAL at 08:35

## 2021-08-02 RX ADMIN — Medication 550 MG: at 10:02

## 2021-08-02 RX ADMIN — THIAMINE HCL TAB 100 MG 100 MG: 100 TAB at 10:01

## 2021-08-02 RX ADMIN — PANCRELIPASE 2 CAPSULE: 24000; 76000; 120000 CAPSULE, DELAYED RELEASE PELLETS ORAL at 17:59

## 2021-08-02 RX ADMIN — HYDROMORPHONE HYDROCHLORIDE 2 MG: 1 SOLUTION ORAL at 12:37

## 2021-08-02 RX ADMIN — HYDROCODONE BITARTRATE AND ACETAMINOPHEN: 500; 5 TABLET ORAL at 12:38

## 2021-08-02 RX ADMIN — SODIUM BICARBONATE 325 MG: 325 TABLET ORAL at 14:01

## 2021-08-02 RX ADMIN — HYDROMORPHONE HYDROCHLORIDE 2 MG: 1 SOLUTION ORAL at 21:06

## 2021-08-02 RX ADMIN — HYDROMORPHONE HYDROCHLORIDE 2 MG: 1 SOLUTION ORAL at 05:34

## 2021-08-02 RX ADMIN — Medication 550 MG: at 20:45

## 2021-08-02 RX ADMIN — Medication 5 ML: at 10:01

## 2021-08-02 RX ADMIN — FOLIC ACID 1 MG: 1 TABLET ORAL at 10:01

## 2021-08-02 RX ADMIN — HYDROXYZINE HYDROCHLORIDE 25 MG: 25 TABLET, FILM COATED ORAL at 17:59

## 2021-08-02 RX ADMIN — SODIUM BICARBONATE 325 MG: 325 TABLET ORAL at 05:34

## 2021-08-02 RX ADMIN — Medication 40 MG: at 10:00

## 2021-08-02 RX ADMIN — SODIUM BICARBONATE 325 MG: 325 TABLET ORAL at 17:59

## 2021-08-02 RX ADMIN — PANCRELIPASE 2 CAPSULE: 24000; 76000; 120000 CAPSULE, DELAYED RELEASE PELLETS ORAL at 05:35

## 2021-08-02 RX ADMIN — PIPERACILLIN SODIUM AND TAZOBACTAM SODIUM 2.25 G: 2; .25 INJECTION, POWDER, LYOPHILIZED, FOR SOLUTION INTRAVENOUS at 17:59

## 2021-08-02 RX ADMIN — SODIUM BICARBONATE 325 MG: 325 TABLET ORAL at 01:37

## 2021-08-02 RX ADMIN — ONDANSETRON 4 MG: 2 INJECTION INTRAMUSCULAR; INTRAVENOUS at 08:35

## 2021-08-02 RX ADMIN — MELATONIN TAB 3 MG 6 MG: 3 TAB at 23:35

## 2021-08-02 RX ADMIN — HYDROMORPHONE HYDROCHLORIDE 2 MG: 1 SOLUTION ORAL at 01:20

## 2021-08-02 RX ADMIN — HYDROMORPHONE HYDROCHLORIDE 2 MG: 1 SOLUTION ORAL at 15:47

## 2021-08-02 RX ADMIN — HYDROMORPHONE HYDROCHLORIDE 2 MG: 1 SOLUTION ORAL at 08:36

## 2021-08-02 RX ADMIN — PANCRELIPASE 2 CAPSULE: 24000; 76000; 120000 CAPSULE, DELAYED RELEASE PELLETS ORAL at 23:25

## 2021-08-02 RX ADMIN — PANCRELIPASE 2 CAPSULE: 24000; 76000; 120000 CAPSULE, DELAYED RELEASE PELLETS ORAL at 14:02

## 2021-08-02 RX ADMIN — CALCIUM CARBONATE (ANTACID) CHEW TAB 500 MG 500 MG: 500 CHEW TAB at 01:20

## 2021-08-02 RX ADMIN — PIPERACILLIN SODIUM AND TAZOBACTAM SODIUM 2.25 G: 2; .25 INJECTION, POWDER, LYOPHILIZED, FOR SOLUTION INTRAVENOUS at 00:31

## 2021-08-02 RX ADMIN — PIPERACILLIN SODIUM AND TAZOBACTAM SODIUM 2.25 G: 2; .25 INJECTION, POWDER, LYOPHILIZED, FOR SOLUTION INTRAVENOUS at 05:34

## 2021-08-02 RX ADMIN — PIPERACILLIN SODIUM AND TAZOBACTAM SODIUM 2.25 G: 2; .25 INJECTION, POWDER, LYOPHILIZED, FOR SOLUTION INTRAVENOUS at 23:35

## 2021-08-02 RX ADMIN — PIPERACILLIN SODIUM AND TAZOBACTAM SODIUM 2.25 G: 2; .25 INJECTION, POWDER, LYOPHILIZED, FOR SOLUTION INTRAVENOUS at 12:38

## 2021-08-02 RX ADMIN — POTASSIUM CHLORIDE 20 MEQ: 1.5 POWDER, FOR SOLUTION ORAL at 10:00

## 2021-08-02 RX ADMIN — PANCRELIPASE 2 CAPSULE: 24000; 76000; 120000 CAPSULE, DELAYED RELEASE PELLETS ORAL at 01:37

## 2021-08-02 RX ADMIN — SODIUM BICARBONATE 325 MG: 325 TABLET ORAL at 23:24

## 2021-08-02 ASSESSMENT — ACTIVITIES OF DAILY LIVING (ADL)
ADLS_ACUITY_SCORE: 20

## 2021-08-02 ASSESSMENT — MIFFLIN-ST. JEOR: SCORE: 1617.5

## 2021-08-02 NOTE — PROGRESS NOTES
Rapid Response Team Note    Assessment   In assessment a rapid response was called on Dax Villareal due to SIRS/Sepsis trigger and lactic acidosis. This presentation is likely due to dehydration and worsened by necrotizing pancreatitis.     Currently on Zosyn, repeat endoscopic necrosectomy possibly next week, also receives PRN paracentesis.      Plan   -  Repeat lactic acid with AM labs    -  The Internal Medicine primary team was paged and currently awaiting a response.  -  Disposition: The patient will remain on the current unit. We will continue to monitor this patient closely.  -  Reassessment and plan follow-up will be performed by the primary team      SOFIA Velez CNP  [facility] RRT AMCOM Job Code Contact #[phone]    Hospital Course   Brief Summary of events leading to rapid response:   BPA for sepsis eval due to leukocytosis and HR    Admission Diagnosis:   Pancreatitis [K85.90]     Physical Exam   Temp: 98.4  F (36.9  C) Temp  Min: 97.3  F (36.3  C)  Max: 98.7  F (37.1  C)  Resp: 20 Resp  Min: 17  Max: 22  SpO2: 100 % SpO2  Min: 99 %  Max: 100 %  Pulse: 110 Pulse  Min: 109  Max: 120    No data recorded  BP: 121/61 Systolic (24hrs), Av , Min:110 , Max:123   Diastolic (24hrs), Av, Min:53, Max:77     I/Os: I/O last 3 completed shifts:  In: 1895 [P.O.:140; I.V.:350; Other:500; NG/GT:435]  Out: 805 [Emesis/NG output:210; Drains:595]     Exam:   General: chronically ill appearing  Mental Status: baseline mental status.      Significant Results and Procedures   Lactic Acid:   Recent Labs   Lab Test 21  0142 21  0055 21  2052 21  2215 21  2116 21  2106 21  2106   LACT 2.1* 1.9 4.0*   < >  --   --   --    LACTS  --   --   --   --  1.1 0.9 1.5    < > = values in this interval not displayed.     CBC:   Recent Labs   Lab Test 21  1414 21  0603 21  0752   WBC 14.0* 15.2* 18.2*   HGB 8.1* 8.2* 8.7*   HCT 25.0* 25.9* 26.9*   PLT 98*  124* 148*        Sepsis Evaluation   The patient is known to have an infection.  NO EVIDENCE OF SEPSIS at this time.  Vital sign, physical exam, and lab findings are due to dehydration .

## 2021-08-02 NOTE — PROGRESS NOTES
AVSS. Continues to be tachycardic (100-120s). Pain well controlled with prn Dilaudid. Reports intermittent anxiety. Weaned down to 1L humidified O2. TF had to be held two different times due to pt's c/o intolerance ( bloating, nausea and vomiting). Pt had also been drinking significant amount water/gingerale while at the same time wanting TF to be stopped. Pt made NPO except for ice chips in order to assist his TF tolerance by decreasing his PO intake. 3 loose BM so far today, refusing Lactulose, education provided about its importance. G-tube to gravity. Family at bedside this evening. Continue to monitor and with POC, update team with changes/concerns.

## 2021-08-02 NOTE — PROGRESS NOTES
Nephrology Progress Note  08/02/2021         Dax Villareal is a 30 year old male with h/o ETOH hepatitis, end stage liver disease, RADHA on HD, transferred from [facility] in [place] for septic shock on 6/28/21 for treatment of necrotizing pancreatitis and decompensated liver disease. He was initially admitted to [facility] 5/19/21.        Interval History :   Mr Villareal had GI stenting of pancreas for second time on 7/28, continues to have significant abd pain which is limiting activity and ability to eat.  Planning for HD on TTS schedule, BP's are stable despite his overall issues, he is at high risk for issues of immobility as his pain has been limiting.        Assessment & Recommendations:   RADHA-Cr was 0.8 as recently as 5/19/2021 but now HD dependent, started RRT at [facility] prior to transfer to [facility] (exact date unclear but per family it was end of May).  Etiology likely multifactorial with contrast, sepsis/pancreatitis, likely HRS physiology contributing as well.  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  Continuing RRT as long as it is tolerated with regards to BP's, is functional enough to possibly do outpt HD.  MELD is in high 30's so has high 3 month mortality but continuing restorative cares for now.  Had GI stenting of pancreas on 7/21 and 7/28.                 -Line is TDC from PTA               -Holding on run today, will plan for HD tomorrow.       Volume status-Anuric, planning on HD tomorrow and will UF as able, BP's stable.       Electrolytes/pH-K 3.3 and Na 131, on fluid restriction for hyponatremia.  Improving Na and volume status.       Ca/phos/pth-Ca 8.3, corrects to normal with low albumin.  Mg and Phos WNL.       Anemia-Hgb 8.1, acute management per team, last PRBC's 7/14.       Nutrition-Taking PO, appetite ok.       Discussed with Dr Dow      Recommendations were communicated to primary team via verbal communication.        SOFIA Marlow CNS  Clinical Nurse  "Specialist  [phone]    Review of Systems:   I reviewed the following systems:  Gen: No fevers or chills  CV: No CP at rest  Resp: No SOB at rest  GI: No N/V    Physical Exam:   I/O last 3 completed shifts:  In: 2177 [P.O.:240; I.V.:350; Other:600; NG/GT:457]  Out: 1170 [Emesis/NG output:140; Drains:1030]   /63 (BP Location: Right arm)   Pulse 118   Temp 98.1  F (36.7  C) (Oral)   Resp 18   Ht 1.676 m (5' 5.98\")   Wt 72 kg (158 lb 11.7 oz)   SpO2 97%   BMI 25.63 kg/m          GENERAL APPEARANCE: Mild distress, + abd pain.    EYES:  scleral icterus, pupils equal  Pulmonary: lungs clear to auscultation. Off supplemental oxygen   CV: tachy   - Edema - no LE edema  GI: large distended, tender. Has retroperitoneal drain  MS: no evidence of inflammation in joints, no muscle tenderness  SKIN: no rash, warm, dry, no cyanosis  NEURO: alert/interactive  ACCESS: Right TDC    Labs:   All labs reviewed by me  Electrolytes/Renal - Recent Labs   Lab Test 08/02/21  0603 08/01/21  1414 08/01/21  0315 07/31/21  0603   * 134  --  133   POTASSIUM 3.3* 3.0*  --  4.1   CHLORIDE 96 97  --  100   CO2 26 28  --  21   BUN 26 22  --  34*   CR 3.21* 2.68*  --  3.35*   * 131* 159* 68*   JANENE 8.3* 8.2*  --  8.9   MAG 2.4* 1.7  --  1.6   PHOS 2.7 2.8  --  3.5       CBC -   Recent Labs   Lab Test 08/02/21  0603 08/01/21  1414 07/31/21  0603   WBC 15.7* 14.0* 15.2*   HGB 8.1* 8.1* 8.2*   * 98* 124*       LFTs -   Recent Labs   Lab Test 08/02/21  0603 08/01/21  1414 07/31/21  0603   ALKPHOS 118 122 111   BILITOTAL 3.7* 4.2* 4.6*   ALT 14 15 14   AST 26 28 38   PROTTOTAL 6.8 7.3 6.6*   ALBUMIN 1.8* 2.0* 1.9*       Iron Panel -   Recent Labs   Lab Test 07/19/21  0632   IRON 31*   IRONSAT 43           Current Medications:    sodium bicarbonate  325 mg Per Feeding Tube Q4H    And     amylase-lipase-protease  1-2 capsule Per Feeding Tube Q4H     amylase-lipase-protease  2 capsule Oral TID w/meals     B and C vitamin " Complex with folic acid  5 mL Per Feeding Tube Daily     folic acid  1 mg Oral Daily    Or     folic acid  1 mg Intravenous Daily     lactulose  20 g Oral or NG Tube 4x Daily     melatonin  6 mg Oral At Bedtime     pantoprazole  40 mg Oral QAM AC     piperacillin-tazobactam  2.25 g Intravenous Q6H     rifaximin  550 mg Oral BID     sodium chloride (PF)  100 mL Irrigation Q4H LENKA     sodium chloride (PF)  3 mL Intracatheter Q8H     sodium chloride (PF)  3 mL Intracatheter Q8H     vitamin B1  100 mg Oral Daily    Or     thiamine  100 mg Intravenous Daily       - MEDICATION INSTRUCTIONS -       abigail AGUILAR, Amparo Kelley, saw and evaluated this patient as part of a shared visit.  I have reviewed and discussed with the advanced practice provider their history, physical and plan.    I personally reviewed the vital signs, medications, labs and imaging.    My key history or physical exam findings:  RADHA on dialysis, pancreatitis, hepatitis, quite ill  Key management decisions made by me:  Dialysis tomorrow    Amparo Kelley  Date of Service (when I saw the patient): 8/2

## 2021-08-02 NOTE — PROGRESS NOTES
[facility]    Progress Note - [place] 3 Service        Date of Admission:  6/28/2021    Assessment & Plan   Dax Villareal is a 30 year old male with hx of alcohol use disorder admitted on 6/28/2021 after recent prolonged admission at [facility] in [place] who has severe acute alcoholic hepatitis c/b HE, ESRD requiring HD, and malnutrition in the setting of acute necrotizing pancreatitis, s/p PEG-J placement on 7/13. He arrived with acute respiratory failure and septic shock that have resolved, and his secondary bacterial peritonitis has been appropriately treated. He requires continued management for ESRD, hepatic encephalopathy, infected pancreatic pseudocyst, nutritional support via PEG-J, and persistent right pleural effusion.     Today:  - Made NPO in favor of continuous tube feeds  - Will reassess necresectomy per nutritional status   - Sister Noni at bedside  - CXR with increasing size of hydrothorax, but no increase in O2 requirements     # Necrotizing pancreatitis w/ infected pseudocyst s/p RP Drain and Cystogastrostomy, improving  # Septic Shock, resolved   # Bacterial Peritonitis 2/2 infected pseudocyst, resolved  # Leukocytosis, stable  # Peripancreatic fluid collection growing VRE   Etiology of the septic shock that he came in with likely due to necrotizing pancreatitis, infected pseudocyst, and peritonitis. Had percutaneous RP drain placed for drainage of pancreatic collection. Diagnostic para returned with peritonitis. Per discussion with ID, ongoing leukocytosis is not unexpected with nec panc. CT 7/18 showed unchanged moderate ascites and collection size. Cystgastrostomy on 7/21 with therapeutic para.      CT 7/27 showed pancreatic stent partially in adjacent mesentery and displaced gastrostomy balloon, GI replaced stent on 7/28. CT also showed mild peritoneal thickening, unable to rule out ongoing peritonitis, though patient has clinically been afebrile, no  significant changes in abdominal exam.     CT 7/30 showed slightly decreased size of necrotic collection s/p stent replacement, adequate positioning of GJ balloon.      - Continue Zosyn through endoscopic debridement  - Repeat endoscopic necrosectomy pushed, likely next week  - PRN paracentesis  - Daily CBC  - MAP goal > 65  - GI following, appreciate recs               - Pancreatic enzymes    - For back pain: Tylenol PRN and Dilaudid 2mg q3h PRN  - Blood cultures NGTD     # Lactic acidosis, recurrent  Recurrent episodes of lactic acidosis triggering rapid responses. Generally fluid responsive, although have been cautious given patient's delicate fluid state. Ongoing antibiotics w/ IV Zosyn (from ceftriaxone prior). Suspect lactic acidosis is multifactorial, r/t fluid removal from dialysis and fluid loss from emesis as well as ongoing liver disease/pancreatitis.     Pt triggered sepsis again 7/30 after fluid removal (1.5 L off in dialysis, low PO/TF intake, increased G tube output, etc.) Lactic still elevated at 4.0 s/p 3x 500 mL bolus and albumin. GI consulted, no acute concerns/interventions.     - Lactic improved s/p HD yesterday  - Continue Zosyn  - Close monitoring of fluid status     # Right hepatic hydrothorax  Intermittently increased subjective shortness of breath. CXR on 7/19 w/ increased RLL haziness; ddx includes pleural effusion v pneumonia v atelectasis. CT on 7/28 showed large volume R hepatic hydrothorax with complete atelectasis of right lung, bedside thora completed 7/28 with labs and cultures.     CXR 7/30 showed moderate R pleural effusion, unchanged from previous and improved aeration of right lung with small remaining pleural effusion on CT. Thora cultures negative.     - IR completed thoracentesis 7/28 - exudative  - ID following: agree to continue with empiric abx, though pleural effusion most likely 2/2 hepatic hydrothorax and pancreatitis. Deferred to pulm team regarding decision for  further drainage/chest tube placement.     # Severe malnutrition in setting of chronic illness  PEG-J placed 7/13. Procedure went well. On 7/29, Nutrition increased TFs to goal of 75 mL/hr x16hr (4pm-8am). Switched to continuous TF 7/31 at slower rate to see if this was more tolerable for patient (pt had emesis yesterday w/ TF at too high of rate, but rate now decreased and pt is tolerating)     - Dietitian consult for new tube feeds, appreciate recs   - Continuous TF per dietician order (advancing slowly)              - Cont panc enzymes  - Regular Diet, thin liquids per SLP  - Continue multivitamins  - Pantoprazole 40 mg IV daily  - Zofran PRN     # Alcoholic hepatitis complicated by portal hypertension, ascites, and HE - improving   # Hx of Alcohol use disorder c/b withdrawal   # Anemia  # Coagulopathy   Patient with a history of marked alcohol use and alcoholic hepatitis in the past. Was given a course of steroids (1 week) during previous admission at OSH. Patient mental status has been stable, alert and oriented x4 for a couple weeks. In early-mid July, patient consistently had 1-2 pRBC infusions weekly d/t Hgb drops of 0.3-0.5 every day (etilogy likely chronic liver disease, chronic kidney disease, phlebotomy, dilution, poor nutrition, ect). Hgb stable around 8.3-8.6 for several days.      - Lactulose 20g QID and PRN and rifaximin 550mg BID - as patient allows  - Trend MELD labs  - Daily folic acid and thiamine  - Delirium precautions  - Melatonin 6mg at bedtime  - Daily CBC   - Transfuse if hemoglobin < 7, platelet < 10 or < 30 with bleeding  - Continue to monitor for signs of bleeding     MELD-Na score: 30 at 8/2/2021  6:03 AM  MELD score: 28 at 8/2/2021  6:03 AM  Calculated from:  Serum Creatinine: 3.21 mg/dL at 8/2/2021  6:03 AM  Serum Sodium: 131 mmol/L at 8/2/2021  6:03 AM  Total Bilirubin: 3.7 mg/dL at 8/2/2021  6:03 AM  INR(ratio): 1.69 at 8/1/2021  2:14 PM  Age: 30 years     # ESRD on HD  (MWF)  # Hypervolemia causing acute respiratory failure, resolved  Nephrology note 7/21 indicates that patient has been on renal replacement therapy for two months and pt status has most likely transitioned from ARF to ESRD at this point.          > Hemodialysis as per renal reccs              - Midodrine 10 mg PRN before dialysis sessions     > Daily chemistry labs, strict I/O, and daily weights  - Aspiration precautions  - Supplemental oxygen as needed to keep sats above 89%    # Apathy, concern for depression  Patient is intermittently refusing cares due to wanting a 'break,' but also reaffirms that he wants everything done to prolong his life. Mood is depressed, apathetic at times. Patient has multiple stressors including his own medical conditions and family health issues.  - Pt reported significant fatigue today; did not want to discuss possible psychiatry consult. Will consider psychiatry consult in the future if the patient is agreeable.     # Goals of Care  Please see additional interval note for in depth details regarding conversation on 7/9 about goals of care. Patient's long term prognosis is still quite poor given his multi organ failure. Care conference on 7/15, pt did not wish to participate. Met with gurinder Cheney (on phone) and Melyssa and close friend Leroy with palliative, nephrology and general medicine teams today to discuss Dax's current improving status, poor prognosis and wishes to continue full cares. *Tentative* Plan to meet Fridays at 4 pm with teams and family for weekly updates.     - Hepatology confirmed pt will not be a transplant candidate  - Daily phone updates to  Noni ([phone])     Diet: Snacks/Supplements Adult: Ensure Clear; With Meals  Fluid restriction 1200 ML FLUID  Regular Diet Adult  Adult Formula Drip Feeding: Continuous Vital 1.5; Jejunostomy; Goal Rate: 65; mL/hr; Medication - Feeding Tube Flush Frequency: At least 15-30 mL water before and after medication  administration and with tube clogging; Amount to Send (Nutrition us...    DVT Prophylaxis: Pneumatic Compression Devices  Rdz Catheter: Not present  Fluids: 10 mL NS TKO  Central Lines: PRESENT  PICC Triple Lumen 06/28/21 Left-Site Assessment: WDL  CVC Double Lumen Right-Site Assessment: WDL  Code Status: Full Code      Disposition Plan   Expected discharge: >7 days, recommended to transitional care unit once  nutrition improved, infected pancreatic pseudocyst is well managed and plan for dialysis is in place.     The patient's care was discussed with the Attending Physician, Dr. Taylor.    Jaime Amin, MS3  Medical Student  [address]  Securely message with the Vocera Web Console (learn more here)  Text page via [employer] Paging/Directory  Please see sign in/sign out for up to date coverage information  _____________________________________________________________________    Interval History   - HD yesterday w/ 1.5 L off  - Switched to continuous slow TF yesterday to see if this was more agreeable to pt; TF were started at too high a rate overnight (75 ml/hr) and pt had emesis x1. TF's were slowed to 25 ml/hr. Plan to advance to 35 ml/hr shortly.  - Pt defers psychiatry consult today; states that he is too fatigued.      Data reviewed today: I reviewed all medications, new labs and imaging results over the last 24 hours.    Physical Exam   Vital Signs: Temp: 97.6  F (36.4  C) Temp src: Oral BP: 122/62 Pulse: 115   Resp: 18 SpO2: 96 % O2 Device: Nasal cannula with humidification Oxygen Delivery: 5 LPM  Weight: 158 lbs 11.7 oz  Gen: Lying down in bed, alert, appears similar to yesterday.   HEENT: Conjunctival icterus  CV: Tachycardic. Regular rhythm.   Resp: Left lung CTA, moderately diminished lung sounds over right lung, especially lower lobe. No wheezing. Coughing intermittently throughout exam, no significant increase in WOB.   Abd: Refused  palpation today, active bowel sounds. PEG J in place without surrounding erythema. Abdomen less distended than last week.  Skin: Jaundiced.  Ext: Well perfused, no edema  Neuro: Oriented x3, responding appropriately to questions. No focal deficits. Tremulous.    Data   Recent Labs   Lab 08/02/21  0603 08/01/21  1414 08/01/21  0315 07/31/21  0603 07/30/21  0752 07/29/21  1320   WBC 15.7* 14.0*  --  15.2* 18.2*  --    HGB 8.1* 8.1*  --  8.2* 8.7*  --    MCV 94 93  --  96 93  --    * 98*  --  124* 148*  --    INR  --  1.69*  --  1.73* 1.62*  --    * 134  --  133 136  --    POTASSIUM 3.3* 3.0*  --  4.1 3.1*  --    CHLORIDE 96 97  --  100 103  --    CO2 26 28  --  21 23  --    BUN 26 22  --  34* 25  --    CR 3.21* 2.68*  --  3.35* 2.49*  --    ANIONGAP 9 9  --  12 10  --    JANENE 8.3* 8.2*  --  8.9 8.6  --    * 131* 159* 68* 100*  --    ALBUMIN 1.8* 2.0*  --  1.9* 1.8*   < >   PROTTOTAL 6.8 7.3  --  6.6* 7.0   < >   BILITOTAL 3.7* 4.2*  --  4.6* 4.4*   < >   ALKPHOS 118 122  --  111 129   < >   ALT 14 15  --  14 16   < >   AST 26 28  --  38 46*   < >    < > = values in this interval not displayed.     No results found for this or any previous visit (from the past 24 hour(s)).

## 2021-08-02 NOTE — PROGRESS NOTES
GASTROENTEROLOGY PROGRESS NOTE      Date of Admission: 6/28/2021  Reason for Admission: pancreatitis/hepatitis    ASSESSMENT/RECOMMENDATIONS:  30 year old male with a history of heavy alcohol use who presented to OSH 5/19 for abdominal pain, nausea and vomiting, admitted for alcohol hepatitis, ESLD, RADHA on HD, septic shock requiring pressors as well as necrotizing pancreatitis with large evolving necrotic collection. Transferred to [facility] for consideration of drainage of presumed infected necrotic collection. S/p EUS-guided necrosectomy (7/21/21).     #. Acute necrotizing pancreatitis with presumed infected walled off necrotic collection s/p percutaneous and endoscopic drainage   #. Septic shock, resolved  #. Leukocytosis  #. Ascites/Peritonitis  Unclear evolution of pancreatitis history but had a recent CT scan with large evolving necrotic collection, presumed infected. No obvious gas in collection. CT on admission to [facility] showing mature collection with enhancing wall measuring ~[id number]. With profound coagulopathy (liver disease + malnutrition +sepsis), INR 3.8 on transfer he was given FFP and Vit K, IR placed perc drain 6/29. Repeat imaging 7/18 with still large walled off necrotic collection that does appear amenable to endoscopic transluminal drainage, underwent cystgastrostomy stenting with necrosectomy on 7/21/21 (see procedure notes for details), large amount of solid debris identified. Would recommend ongoing discussions with the patient regarding goals of care.  Etiology: alcohol  Date of onset: 5/19  BISAP score on admission: unclear  Concurrent organ failure: respiratory (now extubated), renal (on HD), liver, cardiogenic (weaned off pressors 6/29)  Thromboses: none  Diabetes: no  Current nutrition access: PEG-J + po diet  Last imaging: CT scan with IV contrast 7/30  Infection/antiinfectives: (Enterococcus faecium VRE from L RP flank drain 7/14)                - Meropenem (6/29 - 7/18), Ciprofloxacin  "(7/19 - 7/21), Vancomycin (6/29 - 6/30), Micafungin (6/29 - 7/1), Ceftriaxone (7/21 - 7/23), Linezolid (7/21 - 7/30), Zosyn (7/27-present)         Intervention:    6/30 - Left IR RP perc drain (24F)    7/2 - Diagnostic paracentesis (WBC 2663 - 74% PMN, SAAG <1.1, protein 4.1)     7/5 - Repeat Paracentesis (WBC 1801 - 61% PMN, lipase 48)    7/13 - Diag/therapeutic paracentesis ()    7/13 - PEG-J placement with T-tag gastropexy    7/21 - Repeat paracentesis    7/21 - EUS-cystgastrostomy and necrosectomy, Axios with co-axial Viabil    7/28 - EGD, stent replacement (transluminal Axios with coaxial Solus), PEG-J removal (buried bumper) and replacement of new PEG-J with one new T-tag    - Repeat necrosectomy and Axios removal timing TBD (later this week vs next week)  - Flush drain with 100ml q4hr (not uncommon that the entirety of the flushes will return in gravity bag because of cystgastrostomy presence)  - PRN paracentesis  - Please continue antibiotics for now until debridements are complete -- would not anticipate a need for prophylactic antibiotics (for SBP) after completion of current course  - pain meds per primary team    #. Severe malnutrition in the context of chronic illness  #. S/p PEG-J placement  #. Mild to moderate EPI  Patient with very poor oral intake over the course of his illness and was not meeting calorie requirements orally. S/p PEG-J with T-tag gastropexy (x3) 7/13.  - Tube feeds and oral diet as tolerated  - G tube to gravity for comfort  - Remove new T-tag (placed 7/28) around 8/11  - Continue PERT (defer to dietitian for dosing)  - oral diet as tolerated      #. Alcohol hepatitis  #. Hyperbilirubinemia  #. Coagulopathy  #. Encephalopathy, improved  Known heavy alcohol use. Reportedly received course of steroids at OSH for 1 week with \"some improvement\". Currently has primarily hyperbilirubinemia and mildly elevated AST. Unclear if he has underlying cirrhosis but certainly would fit with " "alcohol hepatitis vs elevated liver tests of critical illness/sepsis. Not candidate for steroids right now. He is receiving lactulose and Xifaxin for encephalopathy. Hepatology notified of admission and they have weighed in on transplant status 7/22 (Makayla Glass APRN CNP)  - trend MELD labs  - continue lactulose/Xifaxin and titrate to 3 soft stools per day -- please record stool output  - alcohol abstinence  - hepatology team following peripherally    This patient has a very poor overall prognosis and agree with ongoing palliative care consultation and GOC discussions with the family    The patient was seen and discussed and plan agreed upon with GI staff, Dr. Suman Norwood PA-C  Advanced Endoscopy/Pancreaticobiliary GI Service  [facility]  Text Page  ____________________________________________      Subjective: Nursing notes and 24hr events reviewed. No new complaints. Reports that he occasionally has vomiting after he drinks too many liquids.    ROS:   4 pt ROS negative unless noted in subjective.     Objective:  Blood pressure 116/65, pulse 118, temperature 98.3  F (36.8  C), temperature source Oral, resp. rate 18, height 1.676 m (5' 5.98\"), weight 72 kg (158 lb 11.7 oz), SpO2 98 %.  Gen: Laying in bed. Appears comfortable, fatigued.   HEENT: NCAT. Conjunctiva clear. Sclera +icteric, poor dentition  CV: Peripheral perfusion intact  Resp: non labored breathing  Abd: moderate distention, minimal light brown output in RP perc drain, GJ tube with minimal output in G tube  Skin:  + jaundice  Ext: warm, well perfused  Mental status/Psych: alert and oriented, flat affect, appears fatigued    LABS:  BMP  Recent Labs   Lab 08/02/21  0603 08/01/21  1414 08/01/21  0315 07/31/21  0603 07/30/21  0752   * 134  --  133 136   POTASSIUM 3.3* 3.0*  --  4.1 3.1*   CHLORIDE 96 97  --  100 103   JANENE 8.3* 8.2*  --  8.9 8.6   CO2 26 28  --  21 23   BUN 26 22  --  34* 25   CR 3.21* 2.68* "  --  3.35* 2.49*   * 131* 159* 68* 100*     CBC  Recent Labs   Lab 08/02/21  0603 08/01/21  1414 07/31/21  0603 07/30/21  0752   WBC 15.7* 14.0* 15.2* 18.2*   RBC 2.65* 2.69* 2.69* 2.90*   HGB 8.1* 8.1* 8.2* 8.7*   HCT 24.8* 25.0* 25.9* 26.9*   MCV 94 93 96 93   MCH 30.6 30.1 30.5 30.0   MCHC 32.7 32.4 31.7 32.3   RDW 15.9* 16.0* 16.2* 16.0*   * 98* 124* 148*     INR  Recent Labs   Lab 08/01/21 1414 07/31/21 0603 07/30/21  0752 07/29/21  0537   INR 1.69* 1.73* 1.62* 1.86*     LFTs  Recent Labs   Lab 08/02/21 0603 08/01/21 1414 07/31/21 0603 07/30/21  0752   ALKPHOS 118 122 111 129   AST 26 28 38 46*   ALT 14 15 14 16   BILITOTAL 3.7* 4.2* 4.6* 4.4*   PROTTOTAL 6.8 7.3 6.6* 7.0   ALBUMIN 1.8* 2.0* 1.9* 1.8*      PANC  No lab results found in last 7 days.      IMAGING:  EXAMINATION: CT ABDOMEN PELVIS W CONTRAST, 7/30/2021 8:58 AM     TECHNIQUE:  Helical CT images from the lung bases through the  symphysis pubis were obtained with IV contrast. Contrast dose:  iopamidol (ISOVUE-370) solution 100 mL     COMPARISON: 7/27/2021, 7/18/2021     HISTORY: Pancreatitis, necrotizing     FINDINGS:     Abdomen and pelvis:      Ongoing sequela of necrotic pancreatitis. Slightly decreased size of  the now 13.2 x 4.4 cm anterior peripancreatic collection which  previously measured 14.7 x 5.7 cm when measured similarly. This fluid  extends along the anterior portion of the pancreas as well as towards  the splenic hilum bordering the anterior perirenal fascia. Left  lateral approach pigtail surgical drain remains in place.  Repositioning of the cystogastrostomy stent now with double pigtail  catheter coursing through and well-positioned within the lateral  aspect of the collection. There is a smaller pocket of fluid not  definitely connected with this collection just anterior to the aorta  seen on series 5 image 20 and 66 measuring 4.5 x 4.1 cm, previously  4.7 x 3.6 cm. Fluid extends along the lateral aspects of the  abdomen  adjacent to the liver and spleen and into the pelvis similar to prior.  Continued heterogeneous enhancement of the pancreatic head and neck.     Unchanged tiny subcentimeter hypodensities in the liver too small to  characterize. No suspicious hepatic lesions. Gallbladder is  nondistended with reactive appearing pericholecystic fluid. Mild  splenomegaly measuring 13.8 cm similar to prior. Enlarged, edematous,  and hypodense kidney similar to prior. No hydronephrosis. No stones  along the expected course of the ureters. Bladder is incompletely  distended.     Bowel is nondilated. Colon is largely filled with liquid stool with  reactive pericolonic inflammation. Prominent central mesenteric lymph  nodes similar to prior and presumably reactive. Scattered ascites.  Percutaneous gastrojejunostomy has been repositioned with now  appropriate intraluminal position of the balloon.     No abdominal aortic aneurysm. Main portal vein and the portal  confluence appears grossly patent.     Bones: Mild degenerative changes. No suspicious osseous lesions. No  evidence of avascular necrosis.     Lung bases: Improved aeration of the right lung still with small right  pleural effusion and adjacent atelectasis. There are now mixed  groundglass and consolidative opacities in the inferior right middle  lobe, possibly sequela of reexpansion. Partially visualized catheter  is in the right atrium.                                                                      IMPRESSION:   1. Ongoing sequela of necrotizing pancreatitis with slightly decreased  size of the peripancreatic necrotic collection status post  cystogastrostomy stent repositioning. Left lateral approach drain  remains in place  2. Adequate repositioning of the gastrojejunostomy with balloon in the  stomach lumen.  3. Continued appearance of ATN in the kidneys. No hydronephrosis.  4. Improved aeration of the right lung. Small right pleural effusion  with adjacent  atelectasis. Mixed groundglass opacities in the right  middle lobe possibly sequela of reexpansion, although infection is not  excluded.  5. Slight interval improvement in the ascites although it still  remains quite large.

## 2021-08-02 NOTE — PROVIDER NOTIFICATION
08/02/21 0200   Call Information   Date of Call 08/02/21   Time of Call 0201   Name of person requesting the team Yessenia KIRK   Title of person requesting team RN   RRT Arrival time 0204   Time RRT ended 0209   Reason for call   Type of RRT Adult   Primary reason for call Sepsis suspected   Sepsis Suspected Elevated Lactate level;WBC <4 or >12   Was patient transferred from the ED, ICU, or PACU within last 24 hours prior to RRT call? No   SBAR   Situation LA 2.1   Background 30 year old male with PMHx significant for alcohol use disorder who has had a prolonged hospitalization at [facility] in [place] for acute alcohol hepatitis/end stage liver disease complicated by hepatic encephalopathy, acute renal failure requiring hemodialysis, acute hypoxemic respiratory failure and septic shock secondary to acute necrotizing pancreatitis. Transferred to [facility] ICU on 6/28/2021 for further management of necrotizing pancreatitis    Notable History/Conditions End-Stage disease;Organ failure;Recent surgery   Assessment sleepy but A+O, AVSS   Patient Outcome   Patient Outcome Stabilized on unit   RRT Team   Attending/Primary/Covering Physician Daniela 3   Date Attending Physician notified 08/02/21   Time Attending Physician notified 0201   Physician(s) Raegan Verde NP   Lead RN Rafael KIRK   RT Bill   Post RRT Intervention Assessment   Post RRT Assessment Stable/Improved   Date Follow Up Done 08/02/21   Time Follow Up Done 0531

## 2021-08-02 NOTE — PROVIDER NOTIFICATION
Paged Daniela Cross Cover: Felicity Hester    [id number]: Dax Dolan abdominal drain has had 20ml of output since 4pm 8/1 despite 100ml irrigation q4. Is this expected? Continue with q4 100ml flushes?   [id number]    Orders: Continue to flush unless starts to cause pt pain or discomfort, will follow up with primary team/ gastroenterology in AM.

## 2021-08-02 NOTE — PLAN OF CARE
"/55 (BP Location: Right arm)   Pulse 118   Temp 98.3  F (36.8  C) (Oral)   Resp 20   Ht 1.676 m (5' 5.98\")   Wt 72 kg (158 lb 11.7 oz)   SpO2 98%   BMI 25.63 kg/m      Neuro: A&Ox4, able to make needs known, whispers, flat affect   Cardiac: ST. 100's-110's, BP's 110's-120's/50's-70's, VSS, afebrile  Respiratory: Sating >92% on 5L NC w/ humidification  GI/: pt on hemodialysis (T,TH,Sat). Anuric, BMx2 this shift- refused lactulose d/t not wanting to stool more, education provided by writing RN about importance of clearing ammonia from body and  encephalopathy.    Diet/appetite: Regular diet, poor appetite, TF continuous increased to 45ml, so far tolerating increase and denies nausea- goal of 65, pt did not tolerate increase on days d/t pt becoming nauseous. TF was held for approx 1.5hr and zofran given d/t increased nausea, G tube unclamped and drained with minimal drainage to help decrease nausea. TF @ 45ml/hr with q4 30ml FWF  Activity:  Assist of 2 for turns and repositions, lift assist  Pain: Dilaudid given x3 for generalized aching and soreness (see flowsheets and MAR) At acceptable level on current regimen.   Skin: No new deficits noted. Blanchable redness and excoriation on buttock and thighs, cream applied  LDA's:  G- tube: currently unclamped per pt request while drinking  J-tube: Cont TF, meds, FWF  R-PICC: Triple lumen, TKO & ABX, SLx2  L- HD Line: SL  L- abdominal drain to gravity, q4 NS flushes to irrigate (see page to team about output) has since increased with tube manipulation    Pt refusing cares, repositions, and full skin assessment (specifically posterior side)     Plan: Continue with POC. Notify primary team with changes.   Yessenia Yancey RN  [facility] Unit    "

## 2021-08-03 ENCOUNTER — APPOINTMENT (OUTPATIENT)
Dept: OCCUPATIONAL THERAPY | Facility: CLINIC | Age: 31
End: 2021-08-03
Attending: INTERNAL MEDICINE
Payer: MEDICAID

## 2021-08-03 LAB
ALBUMIN SERPL-MCNC: 1.8 G/DL (ref 3.4–5)
ALP SERPL-CCNC: 124 U/L (ref 40–150)
ALT SERPL W P-5'-P-CCNC: 14 U/L (ref 0–70)
ANION GAP SERPL CALCULATED.3IONS-SCNC: 15 MMOL/L (ref 3–14)
AST SERPL W P-5'-P-CCNC: 29 U/L (ref 0–45)
BILIRUB SERPL-MCNC: 3.7 MG/DL (ref 0.2–1.3)
BUN SERPL-MCNC: 33 MG/DL (ref 7–30)
CALCIUM SERPL-MCNC: 8.6 MG/DL (ref 8.5–10.1)
CHLORIDE BLD-SCNC: 94 MMOL/L (ref 94–109)
CO2 SERPL-SCNC: 24 MMOL/L (ref 20–32)
CREAT SERPL-MCNC: 4.29 MG/DL (ref 0.66–1.25)
CRP SERPL-MCNC: 88 MG/L (ref 0–8)
ERYTHROCYTE [DISTWIDTH] IN BLOOD BY AUTOMATED COUNT: 15.6 % (ref 10–15)
GFR SERPL CREATININE-BSD FRML MDRD: 17 ML/MIN/1.73M2
GLUCOSE BLD-MCNC: 135 MG/DL (ref 70–99)
HCT VFR BLD AUTO: 24.6 % (ref 40–53)
HGB BLD-MCNC: 8.2 G/DL (ref 13.3–17.7)
LACTATE SERPL-SCNC: 1.8 MMOL/L (ref 0.7–2)
MAGNESIUM SERPL-MCNC: 2.4 MG/DL (ref 1.6–2.3)
MCH RBC QN AUTO: 30.6 PG (ref 26.5–33)
MCHC RBC AUTO-ENTMCNC: 33.3 G/DL (ref 31.5–36.5)
MCV RBC AUTO: 92 FL (ref 78–100)
PHOSPHATE SERPL-MCNC: 3.1 MG/DL (ref 2.5–4.5)
PLATELET # BLD AUTO: 138 10E3/UL (ref 150–450)
POTASSIUM BLD-SCNC: 3.4 MMOL/L (ref 3.4–5.3)
PROT SERPL-MCNC: 7 G/DL (ref 6.8–8.8)
RBC # BLD AUTO: 2.68 10E6/UL (ref 4.4–5.9)
SODIUM SERPL-SCNC: 133 MMOL/L (ref 133–144)
WBC # BLD AUTO: 18.3 10E3/UL (ref 4–11)

## 2021-08-03 PROCEDURE — 250N000013 HC RX MED GY IP 250 OP 250 PS 637: Performed by: INTERNAL MEDICINE

## 2021-08-03 PROCEDURE — 90937 HEMODIALYSIS REPEATED EVAL: CPT

## 2021-08-03 PROCEDURE — 84100 ASSAY OF PHOSPHORUS: CPT | Performed by: STUDENT IN AN ORGANIZED HEALTH CARE EDUCATION/TRAINING PROGRAM

## 2021-08-03 PROCEDURE — 85027 COMPLETE CBC AUTOMATED: CPT | Performed by: STUDENT IN AN ORGANIZED HEALTH CARE EDUCATION/TRAINING PROGRAM

## 2021-08-03 PROCEDURE — 99233 SBSQ HOSP IP/OBS HIGH 50: CPT | Mod: 24 | Performed by: INTERNAL MEDICINE

## 2021-08-03 PROCEDURE — 250N000011 HC RX IP 250 OP 636: Performed by: INTERNAL MEDICINE

## 2021-08-03 PROCEDURE — 36592 COLLECT BLOOD FROM PICC: CPT | Performed by: INTERNAL MEDICINE

## 2021-08-03 PROCEDURE — 99232 SBSQ HOSP IP/OBS MODERATE 35: CPT | Performed by: INTERNAL MEDICINE

## 2021-08-03 PROCEDURE — 99207 PR CDG-CUT & PASTE-POTENTIAL IMPACT ON LEVEL: CPT | Performed by: INTERNAL MEDICINE

## 2021-08-03 PROCEDURE — P9041 ALBUMIN (HUMAN),5%, 50ML: HCPCS | Performed by: CLINICAL NURSE SPECIALIST

## 2021-08-03 PROCEDURE — 97110 THERAPEUTIC EXERCISES: CPT | Mod: GO | Performed by: OCCUPATIONAL THERAPIST

## 2021-08-03 PROCEDURE — 36592 COLLECT BLOOD FROM PICC: CPT | Performed by: STUDENT IN AN ORGANIZED HEALTH CARE EDUCATION/TRAINING PROGRAM

## 2021-08-03 PROCEDURE — 86140 C-REACTIVE PROTEIN: CPT | Performed by: STUDENT IN AN ORGANIZED HEALTH CARE EDUCATION/TRAINING PROGRAM

## 2021-08-03 PROCEDURE — 120N000003 HC R&B IMCU UMMC

## 2021-08-03 PROCEDURE — 99233 SBSQ HOSP IP/OBS HIGH 50: CPT | Mod: GC | Performed by: INTERNAL MEDICINE

## 2021-08-03 PROCEDURE — 250N000009 HC RX 250: Performed by: INTERNAL MEDICINE

## 2021-08-03 PROCEDURE — 83735 ASSAY OF MAGNESIUM: CPT | Performed by: STUDENT IN AN ORGANIZED HEALTH CARE EDUCATION/TRAINING PROGRAM

## 2021-08-03 PROCEDURE — 250N000011 HC RX IP 250 OP 636: Performed by: CLINICAL NURSE SPECIALIST

## 2021-08-03 PROCEDURE — 83605 ASSAY OF LACTIC ACID: CPT | Performed by: INTERNAL MEDICINE

## 2021-08-03 PROCEDURE — 258N000003 HC RX IP 258 OP 636: Performed by: CLINICAL NURSE SPECIALIST

## 2021-08-03 PROCEDURE — 250N000013 HC RX MED GY IP 250 OP 250 PS 637: Performed by: STUDENT IN AN ORGANIZED HEALTH CARE EDUCATION/TRAINING PROGRAM

## 2021-08-03 PROCEDURE — 82947 ASSAY GLUCOSE BLOOD QUANT: CPT | Performed by: STUDENT IN AN ORGANIZED HEALTH CARE EDUCATION/TRAINING PROGRAM

## 2021-08-03 RX ORDER — ALBUMIN, HUMAN INJ 5% 5 %
250 SOLUTION INTRAVENOUS
Status: COMPLETED | OUTPATIENT
Start: 2021-08-03 | End: 2021-08-03

## 2021-08-03 RX ADMIN — HYDROMORPHONE HYDROCHLORIDE 2 MG: 1 SOLUTION ORAL at 03:10

## 2021-08-03 RX ADMIN — PANCRELIPASE 2 CAPSULE: 24000; 76000; 120000 CAPSULE, DELAYED RELEASE PELLETS ORAL at 06:33

## 2021-08-03 RX ADMIN — SODIUM BICARBONATE 325 MG: 325 TABLET ORAL at 06:33

## 2021-08-03 RX ADMIN — THIAMINE HCL TAB 100 MG 100 MG: 100 TAB at 08:20

## 2021-08-03 RX ADMIN — PIPERACILLIN SODIUM AND TAZOBACTAM SODIUM 2.25 G: 2; .25 INJECTION, POWDER, LYOPHILIZED, FOR SOLUTION INTRAVENOUS at 11:39

## 2021-08-03 RX ADMIN — PANCRELIPASE 2 CAPSULE: 24000; 76000; 120000 CAPSULE, DELAYED RELEASE PELLETS ORAL at 10:25

## 2021-08-03 RX ADMIN — PIPERACILLIN SODIUM AND TAZOBACTAM SODIUM 2.25 G: 2; .25 INJECTION, POWDER, LYOPHILIZED, FOR SOLUTION INTRAVENOUS at 20:24

## 2021-08-03 RX ADMIN — SODIUM BICARBONATE 325 MG: 325 TABLET ORAL at 02:37

## 2021-08-03 RX ADMIN — Medication 550 MG: at 20:27

## 2021-08-03 RX ADMIN — ONDANSETRON 4 MG: 2 INJECTION INTRAMUSCULAR; INTRAVENOUS at 20:39

## 2021-08-03 RX ADMIN — SODIUM CHLORIDE 300 ML: 9 INJECTION, SOLUTION INTRAVENOUS at 17:28

## 2021-08-03 RX ADMIN — HYDROXYZINE HYDROCHLORIDE 50 MG: 25 TABLET, FILM COATED ORAL at 08:32

## 2021-08-03 RX ADMIN — SODIUM BICARBONATE 325 MG: 325 TABLET ORAL at 10:25

## 2021-08-03 RX ADMIN — PANCRELIPASE 2 CAPSULE: 24000; 76000; 120000 CAPSULE, DELAYED RELEASE PELLETS ORAL at 02:37

## 2021-08-03 RX ADMIN — Medication 5 ML: at 08:21

## 2021-08-03 RX ADMIN — SODIUM BICARBONATE 325 MG: 325 TABLET ORAL at 20:40

## 2021-08-03 RX ADMIN — FOLIC ACID 1 MG: 1 TABLET ORAL at 08:20

## 2021-08-03 RX ADMIN — MIDODRINE HYDROCHLORIDE 10 MG: 5 TABLET ORAL at 16:35

## 2021-08-03 RX ADMIN — HYDROMORPHONE HYDROCHLORIDE 2 MG: 1 SOLUTION ORAL at 10:25

## 2021-08-03 RX ADMIN — PANCRELIPASE 2 CAPSULE: 24000; 76000; 120000 CAPSULE, DELAYED RELEASE PELLETS ORAL at 15:52

## 2021-08-03 RX ADMIN — PIPERACILLIN SODIUM AND TAZOBACTAM SODIUM 2.25 G: 2; .25 INJECTION, POWDER, LYOPHILIZED, FOR SOLUTION INTRAVENOUS at 05:40

## 2021-08-03 RX ADMIN — HYDROMORPHONE HYDROCHLORIDE 2 MG: 1 SOLUTION ORAL at 20:27

## 2021-08-03 RX ADMIN — HYDROMORPHONE HYDROCHLORIDE 2 MG: 1 SOLUTION ORAL at 13:19

## 2021-08-03 RX ADMIN — Medication 550 MG: at 08:31

## 2021-08-03 RX ADMIN — Medication 40 MG: at 08:20

## 2021-08-03 RX ADMIN — HYDROMORPHONE HYDROCHLORIDE 2 MG: 1 SOLUTION ORAL at 07:02

## 2021-08-03 RX ADMIN — HYDROXYZINE HYDROCHLORIDE 50 MG: 25 TABLET, FILM COATED ORAL at 15:53

## 2021-08-03 RX ADMIN — ONDANSETRON 4 MG: 2 INJECTION INTRAMUSCULAR; INTRAVENOUS at 05:25

## 2021-08-03 RX ADMIN — PANCRELIPASE 2 CAPSULE: 24000; 76000; 120000 CAPSULE, DELAYED RELEASE PELLETS ORAL at 20:40

## 2021-08-03 RX ADMIN — LACTULOSE 20 G: 10 SOLUTION ORAL; RECTAL at 20:28

## 2021-08-03 RX ADMIN — ALBUMIN HUMAN 250 ML: 0.05 INJECTION, SOLUTION INTRAVENOUS at 17:49

## 2021-08-03 RX ADMIN — Medication: at 17:28

## 2021-08-03 RX ADMIN — HYDROXYZINE HYDROCHLORIDE 50 MG: 25 TABLET, FILM COATED ORAL at 01:36

## 2021-08-03 RX ADMIN — HYDROMORPHONE HYDROCHLORIDE 2 MG: 1 SOLUTION ORAL at 00:10

## 2021-08-03 RX ADMIN — SODIUM BICARBONATE 325 MG: 325 TABLET ORAL at 15:53

## 2021-08-03 ASSESSMENT — MIFFLIN-ST. JEOR
SCORE: 1612.5
SCORE: 1612.5

## 2021-08-03 ASSESSMENT — ACTIVITIES OF DAILY LIVING (ADL)
ADLS_ACUITY_SCORE: 22
ADLS_ACUITY_SCORE: 20
ADLS_ACUITY_SCORE: 22

## 2021-08-03 NOTE — PROGRESS NOTES
[facility] PROGRESS NOTE     Patient:  Dax Villareal   YOB: 1990, MRN: [id number]  Date of Visit: 08/03/2021  Date of Admission: 6/28/2021  Consult Requester: Kun Taylor,*          Assessment and Recommendations:   ID Problem List:  1. Pleural effusion  - Cx - neg for aerobic/ anaerobic 7/28/21  2. Lactic acidosis  3. Acute necrotizing pancreatitis s/p cystgastrostomy stenting with necrosectomy 7/21  - abdominal abscess VRE  7/14/21  4. RADHA requiring iHD  5. Decompensated ESLD c/b HE  6. Alcoholic hepatitis  7. Ascites   - peritoneal fluid - cx neg on 7/30/21  8. Sputum Enterococcus faecium ( VSE) and C.albicans     Recommendations:   - Continue Zosyn for now  - Repeat endoscopic necrosectomy possibly next week per GI     Discussion:  Dax Villareal is a 30 year old male with PMHx significant for alcohol use disorder who was admitted to [facility] in [place], [place] for abdominal pain, nausea and vomiting, found to have end stage liver disease c/b hepatic encephalopathy, acute renal failure requiring iHD, SBP and acute necrotizing pancreatitis. ID last signed off 7/23 with recs to transition from broad spectrum antibiotics back to po cipro on 7/26 (see previous note for additional detail).     ID was called back because patient has since undergone R thoracentesis on 7/28 with findings consistent with exudative effusion with an LDH and glucose that were overall were not consistent with empyema and the gross fluid appearance was not noted to appear as pus either, though a pH was not sent. He has had intermittently elevated lactate and so primary team has patient on empiric linezolid and zosyn currently. He otherwise remains afebrile, hemodynamically stable. Overall WBC is improved during admission although continues to have leukocytosis likely related to ongoing necrotizing pancreatitis. Repeat CXR 7/28 (after thora) showing continued moderate sized R pleural  effusion. CT AP notes improved aeration of right lung with mmixed GGOs in the right middle lung that could represent sequela of reexpansion, although infection not excluded. If pneumonia is a concern would recheck sputum sample. Previously grown VSE faecium has not been adequately treated with >1 week of linezolid. Suspect the exudative effusion is due to combination of hepatic hydrothorax and pancreatitis, though given his clinical status it would be reasonable to continue empiric antibiotics while fluid cultures are pending.     ID will continue to follow with you.    Feng Johnson MD,M.Med.Sc.  Infectious Diseases  Pager: [phone]          Interval History and Events:     Afebrile. feels better today. pain is controlled           Antimicrobial Treatment:   Current:  - Linezolid 7/21-7/30  - Zosyn 7/27-present     Past:  - Vancomcyin 6/29  - Meropenem 6/29-7/18  - Micafungin 6/29-7/1  - Cipro 7/197/21  - Ceftriaxone 7/21-7/24; 7/26-7/27         Review of Systems:   Targeted 4 point ROS was completed with pertinent positives and negatives are detailed above.         HPI:   Adopted from initial consult note:    Dax Villareal is a 30 year old male with PMHx significant for alcohol use disorder who was admitted to [facility] in [place], [place] for abdominal pain, nausea and vomiting, found to have end stage liver disease c/b hepatic encephalopathy, acute renal failure requiring iHD, SBP and acute necrotizing pancreatitis.      He required respiratory support (BIPAP, ? Intubation) for respiratory failure and appeared to be in sepsis (lactate reported at 7.7 with abnormal LFTs). He was transferred to [facility] for prolonged admission and reportedly treated with steroids; kidney function and pancreatic enzymes normal on admission). Length of stay stated >40 days. CT imaging 6/19 with concern for necrotizing pancreatitis with follow up CT scan 6/25 with peripancreatic fluid collection and 9 cm  "soft tissue/hemorrhagic structure c/f internal hemorrhage of a pseudocyst. Given multiple antibiotics (Cefepime, Flagyl, Ciprofloxacin, micafungin). Hospital course reportedly complicated by hypoxemic respiratory failure requiring BIPAP, ARF requiring iHD, hepatic encephalopathy, and septic shock 2/2 necrotizing pancreatitis/pancreatic pseudocyst requiring pressor support. Ultimately transferred to [facility] for interventions 6/28. Admitted to the ICU.      Now status post perc IR drain placement 6/29 with cultures that remain NGTD/negative. No endoscopic drainage by GI was pursued given improvement in imaging 7/11. Additionally has had 3 paracenteses (7/2, 7/5, and 7/13 with negative culture work up. Had PEGJ placed 7/13.      He is an outdoorsman. Picks mushrooms, goes fishing/hunting, and hiking. Has not been since \"last spring\". Denies feeling ill prior to admission to OSH. Feeling fairly well currently although notes diffuse abdominal pain. Has had multiple procedures within the past few days and states he feels like he needs a \"rest day\". Denies fever/chills. No URI sx. Is having diarrhea, but on lactulose. Anuric.           Physical Examination:   Temp:  [97  F (36.1  C)-98.6  F (37  C)] 98.6  F (37  C)  Pulse:  [107-120] 107  Resp:  [14-18] 18  BP: ()/(47-66) 108/62  SpO2:  [90 %-97 %] 95 %    I/O last 3 completed shifts:  In: 1490 [P.O.:660; I.V.:110; Other:300; NG/GT:30]  Out: 2575 [Urine:125; Emesis/NG output:2350; Drains:100]    Vitals:    07/31/21 0033 07/31/21 1145 08/01/21 0319 08/02/21 0457   Weight: 75 kg (165 lb 5.5 oz) 75 kg (165 lb 5.5 oz) 72 kg (158 lb 11.7 oz) 72 kg (158 lb 11.7 oz)    08/03/21 0511   Weight: 71.5 kg (157 lb 10.1 oz)       Constitutional:Young adult male seen lying in bed,in NAD.   HEENT: NC/AT, EOMI, scleral icterus, conjunctiva normal, NC in place, OP with MMM  Respiratory: No increased work of breathing.  GI: BS + soft, G tube   Skin: Warm, dry, well-perfused. No bruising, " "bleeding, rashes, or lesions on limited exam.  Neurologic: A&O. Answers questions appropriately, speech normal.   Neuropsychiatric: Calm, flat. Affect appropriate to situation.  PICC.          Medications:       sodium chloride 0.9%  300 mL Hemodialysis Machine Once     albumin human  250 mL Intravenous Once in dialysis/CRRT     sodium bicarbonate  325 mg Per Feeding Tube Q4H    And     amylase-lipase-protease  1-2 capsule Per Feeding Tube Q4H     amylase-lipase-protease  2 capsule Oral TID w/meals     B and C vitamin Complex with folic acid  5 mL Per Feeding Tube Daily     folic acid  1 mg Oral Daily    Or     folic acid  1 mg Intravenous Daily     lactulose  20 g Oral or NG Tube 4x Daily     melatonin  6 mg Oral At Bedtime     - MEDICATION INSTRUCTIONS -   Does not apply Once     pantoprazole  40 mg Oral QAM AC     piperacillin-tazobactam  2.25 g Intravenous Q6H     rifaximin  550 mg Oral BID     sodium chloride (PF)  100 mL Irrigation Q4H LENKA     sodium chloride (PF)  3 mL Intracatheter Q8H     sodium chloride (PF)  3 mL Intracatheter Q8H     sodium chloride (PF)  9 mL Intracatheter During Dialysis/CRRT (from stock)     sodium chloride (PF)  9 mL Intracatheter During Dialysis/CRRT (from stock)     vitamin B1  100 mg Oral Daily    Or     thiamine  100 mg Intravenous Daily       Antiinfectives:  Anti-infectives (From now, onward)    Start     Dose/Rate Route Frequency Ordered Stop    07/27/21 2230  piperacillin-tazobactam (ZOSYN) 2.25 g vial to attach to  ml bag     Note to Pharmacy: For [facility], [facility] and [facility]: For Zosyn-naive patients, use the \"Zosyn initial dose + extended infusion\" order panel.    2.25 g  over 30 Minutes Intravenous EVERY 6 HOURS 07/27/21 2222      07/05/21 0830  rifaximin (XIFAXAN) oral suspension 550 mg      550 mg Oral 2 TIMES DAILY 07/05/21 0757            Infusions/Drips:    - MEDICATION INSTRUCTIONS -       dextrose              Laboratory Data:     Microbiology:  Culture   Date Value Ref " Range Status   07/30/2021 No growth after 3 days  Preliminary   07/30/2021 No anaerobic organisms isolated after 3 days  Preliminary   07/30/2021 No growth after 3 days  Preliminary   07/30/2021 No growth after 4 days  Preliminary   07/30/2021 No growth after 4 days  Preliminary   07/28/2021 No Growth  Final   07/28/2021 No growth after 5 days  Preliminary   07/28/2021 No anaerobic organisms isolated after 5 days  Preliminary   07/27/2021 No Growth  Final   07/27/2021 No Growth  Final   07/26/2021 No Growth  Final   07/26/2021 No Growth  Final   07/21/2021 No Growth  Final   07/19/2021 2+ Candida albicans (A)  Final     Comment:     Susceptibilities not routinely done   07/19/2021 2+ Enterococcus faecium (A)  Final   07/16/2021 4+ Candida albicans (A)  Final     Comment:     Susceptibilities not routinely done   07/16/2021 1+ Normal fausto  Final   07/16/2021 No Growth  Final   07/16/2021 No Growth  Final   07/16/2021 No Growth  Final   07/14/2021 Culture in progress  Final   07/14/2021 4+ Enterococcus faecium VRE (A)  Final     Comment:     Preliminary result, confirmation pending.   07/14/2021 4+ Enterococcus faecium VRE (A)  Final     Comment:     Strain 2   07/14/2021 No anaerobic organisms isolated  Final   07/13/2021 No Growth  Final       Inflammatory Markers    Recent Labs   Lab Test 08/03/21  0620 08/02/21  0603 10/06/20  0927   CRP 88.0* 93.0* 16.9*       Metabolic Studies       Recent Labs   Lab Test 08/03/21  0620 08/03/21  0320 08/02/21  0603 08/01/21  1414 08/01/21  0315 07/31/21  0603 07/31/21  0603 07/30/21  0752 07/29/21  1320 07/29/21  0537     --  131* 134  --   --  133 136  --  134   POTASSIUM 3.4  --  3.3* 3.0*  --   --  4.1 3.1*  --  3.3*   CHLORIDE 94  --  96 97  --   --  100 103  --  100   CO2 24  --  26 28  --   --  21 23  --  21   ANIONGAP 15*  --  9 9  --   --  12 10  --  13   BUN 33*  --  26 22  --   --  34* 25  --  40*   CR 4.29*  --  3.21* 2.68*  --   --  3.35* 2.49*  --  3.79*    GFRESTIMATED 17*  --  24* 30*  --   --  23* 33*  --  20*   *  --  134* 131* 159*  --  68* 100*  --  152*   JANENE 8.6  --  8.3* 8.2*  --   --  8.9 8.6  --  8.8   PHOS 3.1  --  2.7 2.8  --   --  3.5  --   --   --    MAG 2.4*  --  2.4* 1.7  --   --  1.6  --   --   --    LACT  --  1.8 2.1*  --   --    < >  --  4.2*   < >  --     < > = values in this interval not displayed.       Hepatic Studies    Recent Labs   Lab Test 08/03/21 0620 08/02/21 0603 08/01/21 1414 07/31/21 0603 07/30/21  0752 07/29/21  0537 07/28/21  1038   BILITOTAL 3.7* 3.7* 4.2* 4.6* 4.4* 3.8*  --    ALKPHOS 124 118 122 111 129 133  --    ALBUMIN 1.8* 1.8* 2.0* 1.9* 1.8* 1.8*  --    AST 29 26 28 38 46* 33  --    ALT 14 14 15 14 16 12  --    LDH  --   --   --   --   --   --  106       Pancreatitis testing    Recent Labs   Lab Test 06/29/21 0410 06/28/21  1839 05/18/21  1220 12/09/20  1504 10/06/20  0927   LIPASE  --  199 381 140 139   TRIG Unsatisfactory specimen - icteric Canceled, Test credited  --   --   --        Hematology Studies      Recent Labs   Lab Test 08/03/21 0620 08/02/21  1720 08/02/21 0603 08/01/21  1414 07/31/21  0603 07/30/21  0752 06/29/21  0410 06/28/21  1839 05/18/21  1220 12/09/20  1504 10/06/20  0927   WBC 18.3* 15.6* 15.7*  15.7* 14.0* 15.2* 18.2*   < > 50.6* 13.2* 4.2 6.9   ANEU  --  12.0* 11.9*  --   --   --   --  44.0* 12.0* 2.1 5.2   ALYM  --  0.8 1.3  --   --   --   --  0.0* 0.6* 1.3 0.8   MEDHAT  --  2.2* 2.2*  --   --   --   --  2.0* 0.4 0.6 0.8   AEOS  --  0.3 0.0  --   --   --   --  0.5 0.0 0.2 0.1   HGB 8.2* 8.1* 8.1*  8.1* 8.1* 8.2* 8.7*   < > 7.6* 16.6 17.2 18.8*   HCT 24.6* 25.0* 24.8*  24.8* 25.0* 25.9* 26.9*   < > 23.4* 44.8 47.7 51.2   * 125* 110*  110* 98* 124* 148*   < > 127* 222 299 311    < > = values in this interval not displayed.       Arterial Blood Gas Testing    Recent Labs   Lab Test 07/01/21  0014 06/30/21  1716 06/28/21  1839   O2PER 60.0 5L 2L        Body Fluid Studies  Recent  Labs   Lab Test 07/30/21  1510 07/28/21  1216 07/21/21  0842 07/16/21  1154 07/13/21  1037 07/05/21  1605 07/02/21  1250 07/02/21  1250   FTYP  --   --   --   --   --  Ascites  --  Ascites   FCOL Yellow* [place]* Yellow*  --   --  Orange   < > Pink   FAPR Turbid* Cloudy* Cloudy*   < >  --  Cloudy   < > Cloudy   FRBC  --  26  --   --   --   --   --   --    FWBC 364 457 453  --   --  1801   < > 2663   FNEU  --  18 71   < >  --  61   < > 74   FLYM  --  48 17   < >  --  32   < > 19   FMONO  --   --  12  --   --  6   < > 7   FBAS  --   --   --   --   --  1  --   --    FALB 1.1  --  1.8   < >   < > 2.6  --  2.0   FTP 3.6 4.7  --   --   --  5.2   < > 4.1   GS  --   --   --   --   --  No organisms seen  Moderate  WBC'S seen    --  No organisms seen  Moderate  WBC'S seen  predominantly PMN's      < > = values in this interval not displayed.       Drug Level Monitoring  Recent Labs   Lab Test 06/30/21  0700   VANCOMYCIN 31.2*       Hepatitis B Testing   Recent Labs   Lab Test 07/29/21  0537 06/30/21  1245   HBCAB Nonreactive  --    HEPBANG Nonreactive Nonreactive       Last check of C difficile  C Difficile Toxin B by PCR   Date Value Ref Range Status   07/17/2021 Negative Negative Final     Comment:     A negative result does not exclude actual disease due to C. difficile and may be due to improper collection, handling and storage of the specimen or the number of organisms in the specimen is below the detection limit of the assay.       COVID-19 Testing  Recent Labs   Lab Test 07/06/21  2030 06/29/21  0559 05/18/21  1203   [id number] Nasopharyngeal Nasopharyngeal Nasopharyngeal   SARSCOVRES NEGATIVE NEGATIVE NEGATIVE              Imaging:       Recent Results (from the past 48 hour(s))   XR Chest Port 1 View    Narrative    EXAM: XR CHEST PORT 1 VIEW  8/2/2021 11:25 AM     HISTORY:  Increased O2 needs, history of hydrothorax       COMPARISON:  Chest radiograph 7/30/2021    FINDINGS:   Portable AP supine view of the chest.  Right IJ catheter with distal  tip projecting over superior atrial caval junction. Left upper  extremity PICC tip terminates in superior atriocaval junction. Trachea  is midline. Increasing large right-sided pleural effusion and  associated atelectasis. Stable appearance of right-sided pulmonary  opacities. Right heart border is not well visualized. Left lung field  is clear. No identifiable pneumothorax is seen. Bones are grossly  intact.      Impression    IMPRESSION:   1. Increasing large right-sided pleural effusion and associated  atelectasis.  2. Stable appearance of right lung opacities likely consistent with  infection versus atelectasis.    I have personally reviewed the examination and initial interpretation  and I agree with the findings.    CHELLE STARKS MD         SYSTEM ID:  [id number]

## 2021-08-03 NOTE — PROGRESS NOTES
[facility] PROGRESS NOTE     Patient:  Dax Villareal   YOB: 1990, MRN: [id number]  Date of Visit: 08/02/2021  Date of Admission: 6/28/2021  Consult Requester: Kun Taylor,*          Assessment and Recommendations:   ID Problem List:  1. Pleural effusion  - Cx - neg for aerobic/ anaerobic 7/28/21  2. Lactic acidosis  3. Acute necrotizing pancreatitis s/p cystgastrostomy stenting with necrosectomy 7/21  - abdominal abscess VRE  7/14/21  4. RADHA requiring iHD  5. Decompensated ESLD c/b HE  6. Alcoholic hepatitis  7. Ascites   - peritoneal fluid - cx neg on 7/30/21  8. Sputum Enterococcus faecium ( VSE) and C.albicans     Recommendations:   - Continue Zosyn for now    Discussion:  Dax Villareal is a 30 year old male with PMHx significant for alcohol use disorder who was admitted to [facility] in [place], [place] for abdominal pain, nausea and vomiting, found to have end stage liver disease c/b hepatic encephalopathy, acute renal failure requiring iHD, SBP and acute necrotizing pancreatitis. ID last signed off 7/23 with recs to transition from broad spectrum antibiotics back to po cipro on 7/26 (see previous note for additional detail).     ID was called back because patient has since undergone R thoracentesis on 7/28 with findings consistent with exudative effusion with an LDH and glucose that were overall were not consistent with empyema and the gross fluid appearance was not noted to appear as pus either, though a pH was not sent. He has had intermittently elevated lactate and so primary team has patient on empiric linezolid and zosyn currently. He otherwise remains afebrile, hemodynamically stable. Overall WBC is improved during admission although continues to have leukocytosis likely related to ongoing necrotizing pancreatitis. Repeat CXR 7/28 (after thora) showing continued moderate sized R pleural effusion. CT AP notes improved aeration of right lung with  mmixed GGOs in the right middle lung that could represent sequela of reexpansion, although infection not excluded. If pneumonia is a concern would recheck sputum sample. Previously grown VSE faecium has not been adequately treated with >1 week of linezolid. Suspect the exudative effusion is due to combination of hepatic hydrothorax and pancreatitis, though given his clinical status it would be reasonable to continue empiric antibiotics while fluid cultures are pending.     ID will continue to follow with you.    Feng Johnson MD,M.Med.Sc.  Infectious Diseases  Pager: [phone]          Interval History and Events:     Afebrile. c/o pain in his back and joints.          Antimicrobial Treatment:   Current:  - Linezolid 7/21-7/30  - Zosyn 7/27-present     Past:  - Vancomcyin 6/29  - Meropenem 6/29-7/18  - Micafungin 6/29-7/1  - Cipro 7/197/21  - Ceftriaxone 7/21-7/24; 7/26-7/27         Review of Systems:   Targeted 4 point ROS was completed with pertinent positives and negatives are detailed above.         HPI:   Adopted from initial consult note:    Dax Villareal is a 30 year old male with PMHx significant for alcohol use disorder who was admitted to [facility] in [place], [place] for abdominal pain, nausea and vomiting, found to have end stage liver disease c/b hepatic encephalopathy, acute renal failure requiring iHD, SBP and acute necrotizing pancreatitis.      He required respiratory support (BIPAP, ? Intubation) for respiratory failure and appeared to be in sepsis (lactate reported at 7.7 with abnormal LFTs). He was transferred to [facility] for prolonged admission and reportedly treated with steroids; kidney function and pancreatic enzymes normal on admission). Length of stay stated >40 days. CT imaging 6/19 with concern for necrotizing pancreatitis with follow up CT scan 6/25 with peripancreatic fluid collection and 9 cm soft tissue/hemorrhagic structure c/f internal hemorrhage of a  "pseudocyst. Given multiple antibiotics (Cefepime, Flagyl, Ciprofloxacin, micafungin). Hospital course reportedly complicated by hypoxemic respiratory failure requiring BIPAP, ARF requiring iHD, hepatic encephalopathy, and septic shock 2/2 necrotizing pancreatitis/pancreatic pseudocyst requiring pressor support. Ultimately transferred to [facility] for interventions 6/28. Admitted to the ICU.      Now status post perc IR drain placement 6/29 with cultures that remain NGTD/negative. No endoscopic drainage by GI was pursued given improvement in imaging 7/11. Additionally has had 3 paracenteses (7/2, 7/5, and 7/13 with negative culture work up. Had PEGJ placed 7/13.      He is an outdoorsman. Picks mushrooms, goes fishing/hunting, and hiking. Has not been since \"last spring\". Denies feeling ill prior to admission to OSH. Feeling fairly well currently although notes diffuse abdominal pain. Has had multiple procedures within the past few days and states he feels like he needs a \"rest day\". Denies fever/chills. No URI sx. Is having diarrhea, but on lactulose. Anuric.           Physical Examination:   Temp:  [97  F (36.1  C)-98.3  F (36.8  C)] 97  F (36.1  C)  Pulse:  [115-118] 115  Resp:  [14-20] 14  BP: ()/(51-66) 96/51  SpO2:  [90 %-100 %] 95 %    I/O last 3 completed shifts:  In: 1847 [P.O.:780; I.V.:100; Other:200; NG/GT:112]  Out: 2185 [Urine:75; Emesis/NG output:1450; Drains:660]    Vitals:    07/30/21 1013 07/31/21 0033 07/31/21 1145 08/01/21 0319   Weight: 76 kg (167 lb 8.8 oz) 75 kg (165 lb 5.5 oz) 75 kg (165 lb 5.5 oz) 72 kg (158 lb 11.7 oz)    08/02/21 0457   Weight: 72 kg (158 lb 11.7 oz)       Constitutional:Young adult male seen lying in bed,in NAD.   HEENT: NC/AT, EOMI, scleral icterus, conjunctiva normal, NC in place, OP with MMM  Respiratory: No increased work of breathing.  GI: BS + soft, tender all over   Skin: Warm, dry, well-perfused. No bruising, bleeding, rashes, or lesions on limited " "exam.  Neurologic: A&O. Answers questions appropriately, speech normal.   Neuropsychiatric: Calm, flat. Affect appropriate to situation.           Medications:       sodium bicarbonate  325 mg Per Feeding Tube Q4H    And     amylase-lipase-protease  1-2 capsule Per Feeding Tube Q4H     amylase-lipase-protease  2 capsule Oral TID w/meals     B and C vitamin Complex with folic acid  5 mL Per Feeding Tube Daily     folic acid  1 mg Oral Daily    Or     folic acid  1 mg Intravenous Daily     lactulose  20 g Oral or NG Tube 4x Daily     melatonin  6 mg Oral At Bedtime     pantoprazole  40 mg Oral QAM AC     piperacillin-tazobactam  2.25 g Intravenous Q6H     rifaximin  550 mg Oral BID     sodium chloride (PF)  100 mL Irrigation Q4H LENKA     sodium chloride (PF)  3 mL Intracatheter Q8H     sodium chloride (PF)  3 mL Intracatheter Q8H     vitamin B1  100 mg Oral Daily    Or     thiamine  100 mg Intravenous Daily       Antiinfectives:  Anti-infectives (From now, onward)    Start     Dose/Rate Route Frequency Ordered Stop    07/27/21 2230  piperacillin-tazobactam (ZOSYN) 2.25 g vial to attach to  ml bag     Note to Pharmacy: For [facility], [facility] and [facility]: For Zosyn-naive patients, use the \"Zosyn initial dose + extended infusion\" order panel.    2.25 g  over 30 Minutes Intravenous EVERY 6 HOURS 07/27/21 2222      07/05/21 0830  rifaximin (XIFAXAN) oral suspension 550 mg      550 mg Oral 2 TIMES DAILY 07/05/21 0757            Infusions/Drips:    - MEDICATION INSTRUCTIONS -       dextrose              Laboratory Data:     Microbiology:  Culture   Date Value Ref Range Status   07/30/2021 No growth after 2 days  Preliminary   07/30/2021 No anaerobic organisms isolated after 3 days  Preliminary   07/30/2021 No growth after 3 days  Preliminary   07/30/2021 No growth after 3 days  Preliminary   07/30/2021 No growth after 3 days  Preliminary   07/28/2021 No Growth  Final   07/28/2021 No growth after 5 days  Preliminary   07/28/2021 No " anaerobic organisms isolated after 5 days  Preliminary   07/27/2021 No Growth  Final   07/27/2021 No Growth  Final   07/26/2021 No Growth  Final   07/26/2021 No Growth  Final   07/21/2021 No Growth  Final   07/19/2021 2+ Candida albicans (A)  Final     Comment:     Susceptibilities not routinely done   07/19/2021 2+ Enterococcus faecium (A)  Final   07/16/2021 4+ Candida albicans (A)  Final     Comment:     Susceptibilities not routinely done   07/16/2021 1+ Normal fausto  Final   07/16/2021 No Growth  Final   07/16/2021 No Growth  Final   07/16/2021 No Growth  Final   07/14/2021 Culture in progress  Final   07/14/2021 4+ Enterococcus faecium VRE (A)  Final     Comment:     Preliminary result, confirmation pending.   07/14/2021 4+ Enterococcus faecium VRE (A)  Final     Comment:     Strain 2   07/14/2021 No anaerobic organisms isolated  Final   07/13/2021 No Growth  Final       Inflammatory Markers    Recent Labs   Lab Test 08/02/21  0603 10/06/20  0927   CRP 93.0* 16.9*       Metabolic Studies       Recent Labs   Lab Test 08/02/21  0603 08/02/21  0142 08/01/21  1414 08/01/21  0315 07/31/21  0603 07/31/21  0603 07/30/21  0752 07/29/21  1320 07/29/21  0537 07/28/21  1925 07/28/21  0355   *  --  134  --   --  133 136  --  134  --  135   POTASSIUM 3.3*  --  3.0*  --   --  4.1 3.1*  --  3.3*  --  3.2*   CHLORIDE 96  --  97  --   --  100 103  --  100  --  102   CO2 26  --  28  --   --  21 23  --  21  --  25   ANIONGAP 9  --  9  --   --  12 10  --  13  --  8   BUN 26  --  22  --   --  34* 25  --  40*  --  27   CR 3.21*  --  2.68*  --   --  3.35* 2.49*  --  3.79*  --  3.02*   GFRESTIMATED 24*  --  30*  --   --  23* 33*  --  20*  --  26*   *  --  131* 159*  --  68* 100* 87 152*  --  98   JANENE 8.3*  --  8.2*  --   --  8.9 8.6  --  8.8  --  8.4*   PHOS 2.7  --  2.8  --   --  3.5  --   --   --   --   --    MAG 2.4*  --  1.7  --   --  1.6  --   --   --   --   --    LACT 2.1* 2.1*  --   --    < >  --  4.2*  --   --     < >  --     < > = values in this interval not displayed.       Hepatic Studies    Recent Labs   Lab Test 08/02/21  0603 08/01/21  1414 07/31/21  0603 07/30/21  0752 07/29/21  0537 07/28/21  1038 07/28/21  0355   BILITOTAL 3.7* 4.2* 4.6* 4.4* 3.8*  --  4.1*   ALKPHOS 118 122 111 129 133  --  133   ALBUMIN 1.8* 2.0* 1.9* 1.8* 1.8*  --  1.9*   AST 26 28 38 46* 33  --  27   ALT 14 15 14 16 12  --  9   LDH  --   --   --   --   --  106  --        Pancreatitis testing    Recent Labs   Lab Test 06/29/21  0410 06/28/21  1839 05/18/21  1220 12/09/20  1504 10/06/20  0927   LIPASE  --  199 381 140 139   TRIG Unsatisfactory specimen - icteric Canceled, Test credited  --   --   --        Hematology Studies      Recent Labs   Lab Test 08/02/21  1720 08/02/21  0603 08/01/21  1414 07/31/21  0603 07/30/21  0752 07/29/21  0537 06/29/21  0410 06/28/21  1839 05/18/21  1220 12/09/20  1504 10/06/20  0927   WBC 15.6* 15.7*  15.7* 14.0* 15.2* 18.2* 15.1*   < > 50.6* 13.2* 4.2 6.9   ANEU 12.0* 11.9*  --   --   --   --   --  44.0* 12.0* 2.1 5.2   ALYM 0.8 1.3  --   --   --   --   --  0.0* 0.6* 1.3 0.8   MEDHAT 2.2* 2.2*  --   --   --   --   --  2.0* 0.4 0.6 0.8   AEOS 0.3 0.0  --   --   --   --   --  0.5 0.0 0.2 0.1   HGB 8.1* 8.1*  8.1* 8.1* 8.2* 8.7* 8.6*   < > 7.6* 16.6 17.2 18.8*   HCT 25.0* 24.8*  24.8* 25.0* 25.9* 26.9* 25.5*   < > 23.4* 44.8 47.7 51.2   * 110*  110* 98* 124* 148* 151   < > 127* 222 299 311    < > = values in this interval not displayed.       Arterial Blood Gas Testing    Recent Labs   Lab Test 07/01/21  0014 06/30/21  1716 06/28/21  1839   O2PER 60.0 5L 2L        Body Fluid Studies  Recent Labs   Lab Test 07/30/21  1510 07/28/21  1216 07/21/21  0842 07/16/21  1154 07/13/21  1037 07/05/21  1605 07/02/21  1250 07/02/21  1250   FTYP  --   --   --   --   --  Ascites  --  Ascites   FCOL Yellow* [place]* Yellow*  --   --  Orange   < > Pink   FAPR Turbid* Cloudy* Cloudy*   < >  --  Cloudy   < > Cloudy   FRBC  --  26   --   --   --   --   --   --    FWBC 364 457 453  --   --  1801   < > 2663   FNEU  --  18 71   < >  --  61   < > 74   FLYM  --  48 17   < >  --  32   < > 19   FMONO  --   --  12  --   --  6   < > 7   FBAS  --   --   --   --   --  1  --   --    FALB 1.1  --  1.8   < >   < > 2.6  --  2.0   FTP 3.6 4.7  --   --   --  5.2   < > 4.1   GS  --   --   --   --   --  No organisms seen  Moderate  WBC'S seen    --  No organisms seen  Moderate  WBC'S seen  predominantly PMN's      < > = values in this interval not displayed.       Drug Level Monitoring  Recent Labs   Lab Test 06/30/21  0700   VANCOMYCIN 31.2*       Hepatitis B Testing   Recent Labs   Lab Test 07/29/21  0537 06/30/21  1245   HBCAB Nonreactive  --    HEPBANG Nonreactive Nonreactive       Last check of C difficile  C Difficile Toxin B by PCR   Date Value Ref Range Status   07/17/2021 Negative Negative Final     Comment:     A negative result does not exclude actual disease due to C. difficile and may be due to improper collection, handling and storage of the specimen or the number of organisms in the specimen is below the detection limit of the assay.       COVID-19 Testing  Recent Labs   Lab Test 07/06/21  2030 06/29/21  0559 05/18/21  1203   [id number] Nasopharyngeal Nasopharyngeal Nasopharyngeal   SARSCOVRES NEGATIVE NEGATIVE NEGATIVE              Imaging:       Recent Results (from the past 48 hour(s))   XR Chest Port 1 View    Narrative    EXAM: XR CHEST PORT 1 VIEW  8/2/2021 11:25 AM     HISTORY:  Increased O2 needs, history of hydrothorax       COMPARISON:  Chest radiograph 7/30/2021    FINDINGS:   Portable AP supine view of the chest. Right IJ catheter with distal  tip projecting over superior atrial caval junction. Left upper  extremity PICC tip terminates in superior atriocaval junction. Trachea  is midline. Increasing large right-sided pleural effusion and  associated atelectasis. Stable appearance of right-sided pulmonary  opacities. Right heart  border is not well visualized. Left lung field  is clear. No identifiable pneumothorax is seen. Bones are grossly  intact.      Impression    IMPRESSION:   1. Increasing large right-sided pleural effusion and associated  atelectasis.  2. Stable appearance of right lung opacities likely consistent with  infection versus atelectasis.    I have personally reviewed the examination and initial interpretation  and I agree with the findings.    CHELLE STARKS MD         SYSTEM ID:  [id number]

## 2021-08-03 NOTE — PROGRESS NOTES
Nephrology Progress Note  08/03/2021         Dax Villareal is a 30 year old male with h/o ETOH hepatitis, end stage liver disease, RADHA on HD, transferred from [facility] in [place] for septic shock on 6/28/21 for treatment of necrotizing pancreatitis and decompensated liver disease. He was initially admitted to [facility] 5/19/21.        Interval History :   Mr Villareal had GI stenting of pancreas for second time on 7/28, continues to have significant abd pain which is limiting activity and ability to eat but hemodynamically has done fairly well.  Planning HD today with 2L of UF as tolerated, stable on TTS schedule, has WBC which is trending up.         Assessment & Recommendations:   RADHA-Cr was 0.8 as recently as 5/19/2021 but now HD dependent, started RRT at [facility] prior to transfer to [facility] (exact date unclear but per family it was end of May).  Etiology likely multifactorial with contrast, sepsis/pancreatitis, likely HRS physiology contributing as well.  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  Continuing RRT as long as it is tolerated with regards to BP's, is functional enough to possibly do outpt HD.  MELD is in high 30's so has high 3 month mortality but continuing restorative cares for now.  Had GI stenting of pancreas on 7/21 and 7/28.                 -Line is TDC from PTA               -HD today, 2L of UF.  Stable on TTS schedule despite his pancreatitis and liver issues         Volume status-Anuric, planning on HD today, goal 2L of UF as long as BP's remain stable.       Electrolytes/pH-K 3.4 and Na 133, on fluid restriction for hyponatremia.  Improving Na and volume status.       Ca/phos/pth-Ca 8.3, corrects to normal with low albumin.  Mg and Phos WNL.       Anemia-Hgb 8.1, acute management per team, last PRBC's 7/14.       Nutrition-Taking PO, appetite ok.       Discussed with Dr Dow      Recommendations were communicated to primary team via verbal communication.     SOFIA Marlow  "CNS  Clinical Nurse Specialist  [phone]    Review of Systems:   I reviewed the following systems:  Gen: No fevers or chills  CV: No CP at rest  Resp: No SOB at rest  GI: No N/V    Physical Exam:   I/O last 3 completed shifts:  In: 1490 [P.O.:660; I.V.:110; Other:300; NG/GT:30]  Out: 2575 [Urine:125; Emesis/NG output:2350; Drains:100]   /47 (BP Location: Right arm)   Pulse 118   Temp 98.3  F (36.8  C) (Oral)   Resp 18   Ht 1.676 m (5' 5.98\")   Wt 71.5 kg (157 lb 10.1 oz)   SpO2 97%   BMI 25.45 kg/m       GENERAL APPEARANCE: Mild distress, + abd pain.    EYES:  scleral icterus, pupils equal  Pulmonary: lungs clear to auscultation. Off supplemental oxygen   CV: tachy   - Edema - no LE edema  GI: large distended, tender. Has retroperitoneal drain  MS: no evidence of inflammation in joints, no muscle tenderness  SKIN: no rash, warm, dry, no cyanosis  NEURO: alert/interactive  ACCESS: Right TDC    Labs:   All labs reviewed by me  Electrolytes/Renal - Recent Labs   Lab Test 08/03/21  0620 08/02/21  0603 08/01/21  1414    131* 134   POTASSIUM 3.4 3.3* 3.0*   CHLORIDE 94 96 97   CO2 24 26 28   BUN 33* 26 22   CR 4.29* 3.21* 2.68*   * 134* 131*   JANENE 8.6 8.3* 8.2*   MAG 2.4* 2.4* 1.7   PHOS 3.1 2.7 2.8       CBC -   Recent Labs   Lab Test 08/03/21  0620 08/02/21  1720 08/02/21  0603   WBC 18.3* 15.6* 15.7*  15.7*   HGB 8.2* 8.1* 8.1*  8.1*   * 125* 110*  110*       LFTs -   Recent Labs   Lab Test 08/03/21  0620 08/02/21  0603 08/01/21  1414   ALKPHOS 124 118 122   BILITOTAL 3.7* 3.7* 4.2*   ALT 14 14 15   AST 29 26 28   PROTTOTAL 7.0 6.8 7.3   ALBUMIN 1.8* 1.8* 2.0*       Iron Panel -   Recent Labs   Lab Test 07/19/21  0632   IRON 31*   IRONSAT 43           Current Medications:    sodium chloride 0.9%  300 mL Hemodialysis Machine Once     albumin human  250 mL Intravenous Once in dialysis/CRRT     sodium bicarbonate  325 mg Per Feeding Tube Q4H    And     amylase-lipase-protease  1-2 " capsule Per Feeding Tube Q4H     amylase-lipase-protease  2 capsule Oral TID w/meals     B and C vitamin Complex with folic acid  5 mL Per Feeding Tube Daily     folic acid  1 mg Oral Daily    Or     folic acid  1 mg Intravenous Daily     lactulose  20 g Oral or NG Tube 4x Daily     melatonin  6 mg Oral At Bedtime     - MEDICATION INSTRUCTIONS -   Does not apply Once     pantoprazole  40 mg Oral QAM AC     piperacillin-tazobactam  2.25 g Intravenous Q6H     rifaximin  550 mg Oral BID     sodium chloride (PF)  100 mL Irrigation Q4H LENKA     sodium chloride (PF)  3 mL Intracatheter Q8H     sodium chloride (PF)  3 mL Intracatheter Q8H     sodium chloride (PF)  9 mL Intracatheter During Dialysis/CRRT (from stock)     sodium chloride (PF)  9 mL Intracatheter During Dialysis/CRRT (from stock)     vitamin B1  100 mg Oral Daily    Or     thiamine  100 mg Intravenous Daily       - MEDICATION INSTRUCTIONS -       Amparo Albarran, saw and evaluated this patient as part of a shared visit.  I have reviewed and discussed with the advanced practice provider their history, physical and plan.    I personally reviewed the vital signs, medications, labs and imaging.    My key history or physical exam findings:  RADHA on HD, complex pancreatitis   Key management decisions made by me:  Dialysis for volume and solute control, tolerating fairly    Amparo Karel Kelley  Date of Service (when I saw the patient): 8/3

## 2021-08-03 NOTE — PROGRESS NOTES
[facility]    Progress Note - [facility] 3 Service        Date of Admission:  6/28/2021    Assessment & Plan   Dax Villareal is a 30 year old male with hx of alcohol use disorder admitted on 6/28/2021 after recent prolonged admission at [facility] in [place] who has severe acute alcoholic hepatitis c/b HE, ESRD requiring HD, and malnutrition in the setting of acute necrotizing pancreatitis, s/p PEG-J placement on 7/13. He arrived with acute respiratory failure and septic shock that have resolved, and his secondary bacterial peritonitis has been appropriately treated. He requires continued management for ESRD, hepatic encephalopathy, infected pancreatic pseudocyst, nutritional support via PEG-J, and persistent right pleural effusion.     Today:  - Planning for HD today  - Sister Noni and relative to be visiting at bedside today, per patient  - Continue TF's, advancing slowly towards goal of 65 ml/hr as tolerated by patient  - Continue drain flushes per GI     # Necrotizing pancreatitis w/ infected pseudocyst s/p RP Drain and Cystogastrostomy, improving  # Septic Shock, resolved   # Bacterial Peritonitis 2/2 infected pseudocyst, resolved  # Leukocytosis, stable  # Peripancreatic fluid collection growing VRE   Etiology of the septic shock that he came in with likely due to necrotizing pancreatitis, infected pseudocyst, and peritonitis. Had percutaneous RP drain placed for drainage of pancreatic collection. Diagnostic para returned with peritonitis. Per discussion with ID, ongoing leukocytosis is not unexpected with nec panc. CT 7/18 showed unchanged moderate ascites and collection size. Cystgastrostomy on 7/21 with therapeutic para.      CT 7/27 showed pancreatic stent partially in adjacent mesentery and displaced gastrostomy balloon, GI replaced stent on 7/28. CT also showed mild peritoneal thickening, unable to rule out ongoing peritonitis, though patient has clinically been  afebrile, no significant changes in abdominal exam.     CT 7/30 showed slightly decreased size of necrotic collection s/p stent replacement, adequate positioning of GJ balloon.      - Continue Zosyn through endoscopic debridement  - Repeat endoscopic necrosectomy pushed, possibly next week per GI recommendations  - PRN paracentesis  - Daily CBC  - MAP goal > 65  - GI following, appreciate recs               - Pancreatic enzymes    - For back pain: Tylenol PRN and Dilaudid 2mg q3h PRN  - Blood cultures NGTD     # Lactic acidosis, recurrent  Recurrent episodes of lactic acidosis triggering rapid responses. Generally fluid responsive, although have been cautious given patient's delicate fluid state. Ongoing antibiotics w/ IV Zosyn (from ceftriaxone prior). Suspect lactic acidosis is multifactorial, r/t fluid removal from dialysis and fluid loss from emesis as well as ongoing liver disease/pancreatitis.     Pt triggered sepsis again 7/30 after fluid removal (1.5 L off in dialysis, low PO/TF intake, increased G tube output, etc.) Lactic still elevated at 4.0 s/p 3x 500 mL bolus and albumin. GI consulted, no acute concerns/interventions.     - Continue Zosyn  - Close monitoring of fluid status     # Right hepatic hydrothorax  Intermittently increased subjective shortness of breath. CXR on 7/19 w/ increased RLL haziness; ddx includes pleural effusion v pneumonia v atelectasis. CT on 7/28 showed large volume R hepatic hydrothorax with complete atelectasis of right lung, bedside thora completed 7/28 with labs and cultures.     CXR 7/30 showed moderate R pleural effusion, unchanged from previous and improved aeration of right lung with small remaining pleural effusion on CT. Thora cultures negative.     - IR completed thoracentesis 7/28 - exudative  - ID following: agree to continue with empiric abx, though pleural effusion most likely 2/2 hepatic hydrothorax and pancreatitis. Deferred to pulm team regarding decision for  further drainage/chest tube placement.  - PRN thoracentesis for shortness of breath or discomfort     # Severe malnutrition in setting of chronic illness  PEG-J placed 7/13. Procedure went well. On 7/29, Nutrition increased TFs to goal of 75 mL/hr x16hr (4pm-8am). Switched to continuous TF 7/31 at slower rate to see if this was more tolerable for patient (pt had emesis yesterday w/ TF at too high of rate, but rate now decreased and pt is tolerating)     - Dietitian consult for new tube feeds, appreciate recs   - Continuous TF per dietician order (advancing slowly) with goal of 65 ml/hr              - Cont panc enzymes  - Regular Diet, thin liquids per SLP  - Continue multivitamins  - Pantoprazole 40 mg IV daily  - Zofran PRN     # Alcoholic hepatitis complicated by portal hypertension, ascites, and HE - improving   # Hx of Alcohol use disorder c/b withdrawal   # Anemia  # Coagulopathy   Patient with a history of marked alcohol use and alcoholic hepatitis in the past. Was given a course of steroids (1 week) during previous admission at OSH. Patient mental status has been stable, alert and oriented x4 for a couple weeks. In early-mid July, patient consistently had 1-2 pRBC infusions weekly d/t Hgb drops of 0.3-0.5 every day (etilogy likely chronic liver disease, chronic kidney disease, phlebotomy, dilution, poor nutrition, ect). Hgb stable around 8.3-8.6 for several days.      - Lactulose 20g QID and PRN and rifaximin 550mg BID - as patient allows  - Trend MELD labs  - Daily folic acid and thiamine  - Delirium precautions  - Melatonin 6mg at bedtime  - Daily CBC   - Transfuse if hemoglobin < 7, platelet < 10 or < 30 with bleeding  - Continue to monitor for signs of bleeding     MELD-Na score: 32 at 8/3/2021  6:20 AM  MELD score: 31 at 8/3/2021  6:20 AM  Calculated from:  Serum Creatinine: 4.29 mg/dL (Using max of 4 mg/dL) at 8/3/2021  6:20 AM  Serum Sodium: 133 mmol/L at 8/3/2021  6:20 AM  Total Bilirubin: 3.7 mg/dL  at 8/3/2021  6:20 AM  INR(ratio): 1.69 at 8/1/2021  2:14 PM  Age: 30 years     # ESRD on HD (MWF)  # Hypervolemia causing acute respiratory failure, resolved  Nephrology note 7/21 indicates that patient has been on renal replacement therapy for two months and pt status has most likely transitioned from ARF to ESRD at this point.          > Hemodialysis as per renal reccs              - Midodrine 10 mg PRN before dialysis sessions     > Daily chemistry labs, strict I/O, and daily weights  - Aspiration precautions  - Supplemental oxygen as needed to keep sats above 89%    # Apathy, concern for depression  Patient is intermittently refusing cares due to wanting a 'break,' but also reaffirms that he wants everything done to prolong his life. Mood is depressed, apathetic at times. Patient has multiple stressors including his own medical conditions and family health issues.  - Pt reported significant fatigue today; did not want to discuss possible psychiatry consult. Will consider psychiatry consult in the future if the patient is agreeable.     # Goals of Care  Please see additional interval note for in depth details regarding conversation on 7/9 about goals of care. Patient's long term prognosis is still quite poor given his multi organ failure. Care conference on 7/15, pt did not wish to participate. Met with gurinder Cheney (on phone) and Melyssa and close friend Leroy with palliative, nephrology and general medicine teams today to discuss Dax's current improving status, poor prognosis and wishes to continue full cares. *Tentative* Plan to meet Fridays at 4 pm with teams and family for weekly updates.     - Hepatology confirmed pt will not be a transplant candidate  - Daily phone updates to sister Noni ([phone])     Diet: Fluid restriction 1200 ML FLUID  Adult Formula Drip Feeding: Continuous Vital 1.5; Jejunostomy; Goal Rate: 65; mL/hr; Medication - Feeding Tube Flush Frequency: At least 15-30 mL water before and  after medication administration and with tube clogging; Amount to Send (Nutrition us...  NPO for Medical/Clinical Reasons Except for: Meds, Ice Chips    DVT Prophylaxis: Pneumatic Compression Devices  Rdz Catheter: Not present  Fluids: 10 mL NS TKO  Central Lines: PRESENT  PICC Triple Lumen 06/28/21 Left-Site Assessment: WDL  CVC Double Lumen Right-Site Assessment: WDL  Code Status: Full Code      Disposition Plan   Expected discharge: >7 days, recommended to transitional care unit once  nutrition improved, infected pancreatic pseudocyst is well managed and plan for dialysis is in place.     The patient's care was discussed with the Attending Physician, Dr. Sandi Morrow.    Jaime Amin, MS3  Medical Student  [address]  Securely message with the Vocera Web Console (learn more here)  Text page via [employer] Paging/Directory  Please see sign in/sign out for up to date coverage information  _____________________________________________________________________    Interval History   - Sepsis protocol triggered overnight; Lactate 1.8 with no interventions.  - Continuous TF's running at 40 ml/hr this morning  - Planning for HD today  - Pt was not feeling well when first assessed d/t being woken up too abruptly, but became more conversational and reported that he was feeling okay today. Noted that he is anticipating his sister Noni and his Aunt to be visiting him today.    Data reviewed today: I reviewed all medications, new labs and imaging results over the last 24 hours.    Physical Exam   Vital Signs: Temp: 98.6  F (37  C) Temp src: Oral BP: 108/62 Pulse: 107   Resp: 18 SpO2: 95 % O2 Device: Nasal cannula with humidification Oxygen Delivery: 1 LPM  Weight: 157 lbs 10.06 oz  Gen: Lying down in bed, alert, appears similar to yesterday.   HEENT: Conjunctival icterus  CV: Tachycardic. Regular rhythm.   Resp: Left lung CTA, moderately diminished lung sounds over  right lung, especially lower lobe. No wheezing. Coughing intermittently throughout exam, no significant increase in WOB.   Abd: Refused palpation today, active bowel sounds. PEG J in place without surrounding erythema. Abdomen less distended than last week.  Skin: Jaundiced.  Ext: Well perfused, no edema  Neuro: Oriented x3, responding appropriately to questions. No focal deficits. Tremulous.    Data   Recent Labs   Lab 08/03/21  0620 08/02/21  1720 08/02/21  0603 08/01/21  1414 07/31/21  0603 07/30/21  0752 07/30/21  0752   WBC 18.3* 15.6* 15.7*  15.7* 14.0* 15.2*  --  18.2*   HGB 8.2* 8.1* 8.1*  8.1* 8.1* 8.2*  --  8.7*   MCV 92 92 94  94 93 96  --  93   * 125* 110*  110* 98* 124*  --  148*   INR  --   --   --  1.69* 1.73*  --  1.62*     --  131* 134 133  --  136   POTASSIUM 3.4  --  3.3* 3.0* 4.1  --  3.1*   CHLORIDE 94  --  96 97 100  --  103   CO2 24  --  26 28 21  --  23   BUN 33*  --  26 22 34*  --  25   CR 4.29*  --  3.21* 2.68* 3.35*  --  2.49*   ANIONGAP 15*  --  9 9 12  --  10   JANENE 8.6  --  8.3* 8.2* 8.9  --  8.6   *  --  134* 131* 68*   < > 100*   ALBUMIN 1.8*  --  1.8* 2.0* 1.9*  --  1.8*   PROTTOTAL 7.0  --  6.8 7.3 6.6*  --  7.0   BILITOTAL 3.7*  --  3.7* 4.2* 4.6*  --  4.4*   ALKPHOS 124  --  118 122 111  --  129   ALT 14  --  14 15 14  --  16   AST 29  --  26 28 38  --  46*    < > = values in this interval not displayed.     No results found for this or any previous visit (from the past 24 hour(s)).

## 2021-08-03 NOTE — PROVIDER NOTIFICATION
Plan of Care [id number]  Shift Notes:  Notified Daniela Ramos Cover: 6B Rm 39 Pt BUCKY Villareal. FYI Pt has drain flushes q4 Hrs of 100 mL. Per this shift 200mL has been irrigated and only 100 mL output. Also, No Output charted today.   Total Irrigated this shift: 300 mL.   Total Out this shift: 225    Sepsis Protocol Triggered VS Q30x2 and Stat Lactic Acid which resulted 1.8.    Neuro:    - A&Ox4   - Pt talkative and cooperative this staff. Still refusing some cares at times.    Cardiac:    - Rhythm: ST   - VSS.     Respiratory:    - SPO2 > 90% on 1L.    :   - Small Cloudy Urine Occurrence this shift.    GI:   - 1 Loose watery BM this shift.    Diet/appetite:    - NPO w/ Ice Chips   - Continuous Tube Feeding @ 40ml/Hr.    Activity:     - Lift patient, Bed Alarm Active.    Pain:    - At acceptable level on current regimen.     Skin:    - No new skin issues.    LDA's:   - 3 Lumen PICC - Cap's Changed   - Hemodialysis Port   - GJ - Tube      - G - Gravity     - J - Tube Feedings   - Abdominal Drain     - Irrigate 100 mL Q4H     - Gravity    Plan:    - Continue with POC.    - Notify primary team with changes.

## 2021-08-03 NOTE — PROGRESS NOTES
CLINICAL NUTRITION SERVICES - REASSESSMENT NOTE     Nutrition Prescription    RECOMMENDATIONS FOR MDs/PROVIDERS TO ORDER:  None at this time     Malnutrition Status:    Severe malnutrition in the context of acute on chronic illness    Recommendations already ordered by Registered Dietitian (RD):  Due to abdominal pain- slightly decreased enteral provisions:   Vital 1.5 Leno @ goal of  60ml/hr  (1440ml/day)  will provide: 2160 kcals (32 kca/kg), 97 g PRO (1.4), 1100 ml free H20, 269 g CHO, and 8 g fiber daily.  Try to keep HOB elevated 30 degrees if nauseous     Future/Additional Recommendations:  Continue to monitor tolerance to enteral nutrition- ability to reach goal volume   Monitor weight- fluid status   Labs      EVALUATION OF THE PROGRESS TOWARD GOALS   Diet (8/2): NPO   - regular diet + 1200 mL fluid restriction   Intake: Patient taking  mL PO per I/O (7/29-8/3) but noting nausea/abdominal pain, therefore became NPO on 8/2    Nutrition Support (7/31): Vital 1.5 Leno @ goal of  65ml/hr (1560ml/day)  will provide: 2340 kcals (35 kcal/kg), 105 g PRO (1.6 g pro/kg), 1191 ml free H20, 291 g CHO, and 9 g fiber daily, 89 g fat, 88 mEq K+, 1951 mg Po4   Access: GJ  FWF: 30 mL q 4 hours   -Enzymes: creon 24, 2 capsules 2 4 hours = 3236 units lipase/g fat  Intake: TF is currently running at 50 ml/hr (advanced from 40 mL at 0500 8/4)  Average enteral nutrition received 7/28-8/3 provided 286 mL/day, 428 kcal and 19 g protein meeting 21% and 19% of low end estimated energy and protein needs.     -Providing adequate nutrition has been challenging due patient's refusal of continuous feeds. On 7/19, TF was ordered at 6 hour cycle per patient. However, patient also refused 6 hour cycle. No TF received 7/28 due to tube exchange. Noted oral intakes to be poor and TF increased to 16 hour cycle with rate increase to better meet energy/protein needs on 7/29. Only 140 mL received on 7/29 and declined TF on 7/31.  Tube feeding  "increased to continuous feeds on 7/31 due to patient reporting cycled feeds were\" too much to track\"    NEW FINDINGS   Dax was sleeping soundly, did not open eyes to voice, writer let patient rest. TF was running at 50 mL/hr in room.     Weight Trends:  08/03/21  0511 71.5 kg (157 lb 10.1 oz)   08/02/21  0457 72 kg (158 lb 11.7 oz)   08/01/21  0319 72 kg (158 lb 11.7 oz)   07/31/21  1145 75 kg (165 lb 5.5 oz)   07/31/21  0033 75 kg (165 lb 5.5 oz)        07/08/21  0400       67.5 kg  lowest admission weight        06/28/21  1815                    80.2 kg (176 lb 12.9 oz) - admission    GI: Patient has has nausea and emesis with and without TF running. Per nursing progress notes, Emesis over night (7/27, TF was off at 2000), Emesis x3 + nausea (0606-4027 7/27,7/28) -Nausea + emesis (7/30)  Declined TF (7/31), also declined PEG tube cares  On 8/1, TF was running at 65 mL/hr, pt had emesis, TF turned down to 25 mL/hr.   -TF advanced to 35 ML/hr, pt refused HOB elevation, complained of nausea   Patient has zofran and compazine ordered PRN   2-7 bowel movements daily     Skin:Jaundiced. WOCN last assessed on 7/29- signed off.   Labs: Urea nitrogen 33 (H), Creatinine 4.29 (H), K+ 3.4, Po4 3.1 (WNL)  Medications: Nephronex, folic acid, lactulose, protonix, thiamine     MALNUTRITION  % Intake: </= 50% for >/= 5 days (severe)  % Weight Loss: > 5% in 1 month (severe)  Subcutaneous Fat Loss: Facial region:  moderate (per last assessment)   Muscle Loss: Temporal: Severe, Scapular bone: moderate, Thoracic region (clavicle, acromium bone, deltoid, trapezius, pectoral): moderate, Upper arm (bicep, tricep): moderate, Dorsal hand: moderate, Upper leg (quadricep, hamstring): moderate and Posterior calf: moderate, (per last assessment)  Fluid Accumulation/Edema: Mild  Malnutrition Diagnosis: Severe malnutrition in the context of acute on chronic illness     Previous Goals   Total avg nutritional intake to meet a minimum of 25 " kcal/kg and 1.5 g PRO/kg daily (per dosing wt 67.5 kg).  Evaluation: Not met    Previous Nutrition Diagnosis  Inadequate protein-energy intake related to inadequate oral intake and inadequate nutrition support as evidenced by no meals ordered per [facility] since 7/25, no oral intakes documented and patients prior refusal to nutrition support for >6 hours   Evaluation: Improving    CURRENT NUTRITION DIAGNOSIS  Inadequate protein-energy intake related to inadequate enteral nutrition infusion and inability to take adequate PO as evidenced by average enteral nutrition received meeting 21% and 19% of energy needs and current NPO status     INTERVENTIONS  Implementation  Collaboration with other providers  Enteral Nutrition - Modify see above    Goals  Total avg nutritional intake to meet a minimum of 25 kcal/kg and 1.3 g PRO/kg daily (per dosing wt 67.5 kg).    Monitoring/Evaluation  Progress toward goals will be monitored and evaluated per protocol.    Jennfier Castillo RD, LD  6B pager: [phone]

## 2021-08-03 NOTE — PLAN OF CARE
A/O X4. Slow to respond at times. Able to make needs known, uses call light appropriately. Refusing Repositioning at times, educated on importance. C/o abdominal and back pain. Prn dilaudid given. Atarax given for anxiety. HR -120,VSS; afebrile; Lung sounds dim on 1L of O2 per NC (pt preference) with sat maintained above 94%. Anuric, pt on HD. Bm x3.  NPO. TF @ 40 ml/hr through J tube, G tube to gravity. Abd drain flushed per mar. Small output; see flowsheets. Will continue to follow plan of care.

## 2021-08-04 ENCOUNTER — APPOINTMENT (OUTPATIENT)
Dept: GENERAL RADIOLOGY | Facility: CLINIC | Age: 31
End: 2021-08-04
Attending: INTERNAL MEDICINE
Payer: MEDICAID

## 2021-08-04 LAB
ALBUMIN SERPL-MCNC: 2.3 G/DL (ref 3.4–5)
ALP SERPL-CCNC: 136 U/L (ref 40–150)
ALT SERPL W P-5'-P-CCNC: 16 U/L (ref 0–70)
ANION GAP SERPL CALCULATED.3IONS-SCNC: 10 MMOL/L (ref 3–14)
AST SERPL W P-5'-P-CCNC: 34 U/L (ref 0–45)
BACTERIA BLD CULT: NO GROWTH
BACTERIA BLD CULT: NO GROWTH
BACTERIA FLD CULT: NO GROWTH
BACTERIA PLR CULT: NORMAL
BILIRUB SERPL-MCNC: 4.1 MG/DL (ref 0.2–1.3)
BUN SERPL-MCNC: 14 MG/DL (ref 7–30)
CALCIUM SERPL-MCNC: 8.6 MG/DL (ref 8.5–10.1)
CHLORIDE BLD-SCNC: 97 MMOL/L (ref 94–109)
CO2 SERPL-SCNC: 25 MMOL/L (ref 20–32)
CREAT SERPL-MCNC: 2.53 MG/DL (ref 0.66–1.25)
CRP SERPL-MCNC: 89 MG/L (ref 0–8)
ERYTHROCYTE [DISTWIDTH] IN BLOOD BY AUTOMATED COUNT: 15.8 % (ref 10–15)
GFR SERPL CREATININE-BSD FRML MDRD: 33 ML/MIN/1.73M2
GLUCOSE BLD-MCNC: 143 MG/DL (ref 70–99)
HCT VFR BLD AUTO: 25.9 % (ref 40–53)
HGB BLD-MCNC: 8.7 G/DL (ref 13.3–17.7)
LACTATE SERPL-SCNC: 1.9 MMOL/L (ref 0.7–2)
MAGNESIUM SERPL-MCNC: 2 MG/DL (ref 1.6–2.3)
MCH RBC QN AUTO: 30.7 PG (ref 26.5–33)
MCHC RBC AUTO-ENTMCNC: 33.6 G/DL (ref 31.5–36.5)
MCV RBC AUTO: 92 FL (ref 78–100)
PATH REPORT.COMMENTS IMP SPEC: NORMAL
PATH REPORT.FINAL DX SPEC: NORMAL
PATH REPORT.MICROSCOPIC SPEC OTHER STN: NORMAL
PATH REPORT.RELEVANT HX SPEC: NORMAL
PHOSPHATE SERPL-MCNC: 2.9 MG/DL (ref 2.5–4.5)
PLATELET # BLD AUTO: 194 10E3/UL (ref 150–450)
POTASSIUM BLD-SCNC: 3.2 MMOL/L (ref 3.4–5.3)
PROT SERPL-MCNC: 8 G/DL (ref 6.8–8.8)
RBC # BLD AUTO: 2.83 10E6/UL (ref 4.4–5.9)
SODIUM SERPL-SCNC: 132 MMOL/L (ref 133–144)
WBC # BLD AUTO: 22.8 10E3/UL (ref 4–11)

## 2021-08-04 PROCEDURE — 250N000009 HC RX 250: Performed by: INTERNAL MEDICINE

## 2021-08-04 PROCEDURE — 99233 SBSQ HOSP IP/OBS HIGH 50: CPT | Mod: 24 | Performed by: INTERNAL MEDICINE

## 2021-08-04 PROCEDURE — 99231 SBSQ HOSP IP/OBS SF/LOW 25: CPT | Performed by: PHYSICIAN ASSISTANT

## 2021-08-04 PROCEDURE — 250N000013 HC RX MED GY IP 250 OP 250 PS 637: Performed by: INTERNAL MEDICINE

## 2021-08-04 PROCEDURE — 120N000003 HC R&B IMCU UMMC

## 2021-08-04 PROCEDURE — 85014 HEMATOCRIT: CPT | Performed by: STUDENT IN AN ORGANIZED HEALTH CARE EDUCATION/TRAINING PROGRAM

## 2021-08-04 PROCEDURE — 250N000013 HC RX MED GY IP 250 OP 250 PS 637: Performed by: STUDENT IN AN ORGANIZED HEALTH CARE EDUCATION/TRAINING PROGRAM

## 2021-08-04 PROCEDURE — 250N000011 HC RX IP 250 OP 636: Performed by: INTERNAL MEDICINE

## 2021-08-04 PROCEDURE — 74018 RADEX ABDOMEN 1 VIEW: CPT

## 2021-08-04 PROCEDURE — 250N000011 HC RX IP 250 OP 636: Performed by: STUDENT IN AN ORGANIZED HEALTH CARE EDUCATION/TRAINING PROGRAM

## 2021-08-04 PROCEDURE — 83735 ASSAY OF MAGNESIUM: CPT | Performed by: STUDENT IN AN ORGANIZED HEALTH CARE EDUCATION/TRAINING PROGRAM

## 2021-08-04 PROCEDURE — 83605 ASSAY OF LACTIC ACID: CPT | Performed by: ORTHOPAEDIC SURGERY

## 2021-08-04 PROCEDURE — 99233 SBSQ HOSP IP/OBS HIGH 50: CPT | Mod: GC | Performed by: INTERNAL MEDICINE

## 2021-08-04 PROCEDURE — 74018 RADEX ABDOMEN 1 VIEW: CPT | Mod: 26 | Performed by: RADIOLOGY

## 2021-08-04 PROCEDURE — 86140 C-REACTIVE PROTEIN: CPT | Performed by: STUDENT IN AN ORGANIZED HEALTH CARE EDUCATION/TRAINING PROGRAM

## 2021-08-04 PROCEDURE — 36415 COLL VENOUS BLD VENIPUNCTURE: CPT | Performed by: ORTHOPAEDIC SURGERY

## 2021-08-04 PROCEDURE — 99233 SBSQ HOSP IP/OBS HIGH 50: CPT | Performed by: PHYSICIAN ASSISTANT

## 2021-08-04 PROCEDURE — 80053 COMPREHEN METABOLIC PANEL: CPT | Performed by: STUDENT IN AN ORGANIZED HEALTH CARE EDUCATION/TRAINING PROGRAM

## 2021-08-04 PROCEDURE — 84100 ASSAY OF PHOSPHORUS: CPT | Performed by: STUDENT IN AN ORGANIZED HEALTH CARE EDUCATION/TRAINING PROGRAM

## 2021-08-04 PROCEDURE — 36592 COLLECT BLOOD FROM PICC: CPT | Performed by: STUDENT IN AN ORGANIZED HEALTH CARE EDUCATION/TRAINING PROGRAM

## 2021-08-04 RX ORDER — HYDROMORPHONE HYDROCHLORIDE 5 MG/5ML
2 SOLUTION ORAL EVERY 8 HOURS
Status: DISCONTINUED | OUTPATIENT
Start: 2021-08-04 | End: 2021-08-18

## 2021-08-04 RX ORDER — HYDROMORPHONE HYDROCHLORIDE 1 MG/ML
1 SOLUTION ORAL EVERY 4 HOURS PRN
Status: DISCONTINUED | OUTPATIENT
Start: 2021-08-04 | End: 2021-09-20

## 2021-08-04 RX ORDER — POTASSIUM CHLORIDE 7.45 MG/ML
10 INJECTION INTRAVENOUS ONCE
Status: COMPLETED | OUTPATIENT
Start: 2021-08-04 | End: 2021-08-04

## 2021-08-04 RX ADMIN — Medication 550 MG: at 09:32

## 2021-08-04 RX ADMIN — PANCRELIPASE 2 CAPSULE: 24000; 76000; 120000 CAPSULE, DELAYED RELEASE PELLETS ORAL at 01:46

## 2021-08-04 RX ADMIN — PIPERACILLIN SODIUM AND TAZOBACTAM SODIUM 2.25 G: 2; .25 INJECTION, POWDER, LYOPHILIZED, FOR SOLUTION INTRAVENOUS at 13:15

## 2021-08-04 RX ADMIN — CALCIUM CARBONATE (ANTACID) CHEW TAB 500 MG 500 MG: 500 CHEW TAB at 21:13

## 2021-08-04 RX ADMIN — ONDANSETRON 4 MG: 2 INJECTION INTRAMUSCULAR; INTRAVENOUS at 14:07

## 2021-08-04 RX ADMIN — POTASSIUM CHLORIDE 10 MEQ: 7.46 INJECTION, SOLUTION INTRAVENOUS at 09:50

## 2021-08-04 RX ADMIN — THIAMINE HYDROCHLORIDE 100 MG: 100 INJECTION, SOLUTION INTRAMUSCULAR; INTRAVENOUS at 09:31

## 2021-08-04 RX ADMIN — PIPERACILLIN SODIUM AND TAZOBACTAM SODIUM 2.25 G: 2; .25 INJECTION, POWDER, LYOPHILIZED, FOR SOLUTION INTRAVENOUS at 06:00

## 2021-08-04 RX ADMIN — PANCRELIPASE 2 CAPSULE: 24000; 76000; 120000 CAPSULE, DELAYED RELEASE PELLETS ORAL at 13:16

## 2021-08-04 RX ADMIN — PIPERACILLIN SODIUM AND TAZOBACTAM SODIUM 2.25 G: 2; .25 INJECTION, POWDER, LYOPHILIZED, FOR SOLUTION INTRAVENOUS at 18:27

## 2021-08-04 RX ADMIN — ONDANSETRON 4 MG: 2 INJECTION INTRAMUSCULAR; INTRAVENOUS at 21:13

## 2021-08-04 RX ADMIN — CALCIUM CARBONATE (ANTACID) CHEW TAB 500 MG 500 MG: 500 CHEW TAB at 00:40

## 2021-08-04 RX ADMIN — CALCIUM CARBONATE (ANTACID) CHEW TAB 500 MG 500 MG: 500 CHEW TAB at 08:36

## 2021-08-04 RX ADMIN — LACTULOSE 20 G: 10 SOLUTION ORAL; RECTAL at 13:15

## 2021-08-04 RX ADMIN — Medication 5 ML: at 09:30

## 2021-08-04 RX ADMIN — PIPERACILLIN SODIUM AND TAZOBACTAM SODIUM 2.25 G: 2; .25 INJECTION, POWDER, LYOPHILIZED, FOR SOLUTION INTRAVENOUS at 01:00

## 2021-08-04 RX ADMIN — ONDANSETRON 4 MG: 2 INJECTION INTRAMUSCULAR; INTRAVENOUS at 09:13

## 2021-08-04 RX ADMIN — PANCRELIPASE 2 CAPSULE: 24000; 76000; 120000 CAPSULE, DELAYED RELEASE PELLETS ORAL at 05:13

## 2021-08-04 RX ADMIN — SODIUM BICARBONATE 325 MG: 325 TABLET ORAL at 17:35

## 2021-08-04 RX ADMIN — SODIUM BICARBONATE 325 MG: 325 TABLET ORAL at 19:38

## 2021-08-04 RX ADMIN — PANCRELIPASE 2 CAPSULE: 24000; 76000; 120000 CAPSULE, DELAYED RELEASE PELLETS ORAL at 08:12

## 2021-08-04 RX ADMIN — PANCRELIPASE 2 CAPSULE: 24000; 76000; 120000 CAPSULE, DELAYED RELEASE PELLETS ORAL at 17:34

## 2021-08-04 RX ADMIN — HYDROXYZINE HYDROCHLORIDE 50 MG: 25 TABLET, FILM COATED ORAL at 19:44

## 2021-08-04 RX ADMIN — PANCRELIPASE 2 CAPSULE: 24000; 76000; 120000 CAPSULE, DELAYED RELEASE PELLETS ORAL at 19:43

## 2021-08-04 RX ADMIN — SODIUM BICARBONATE 325 MG: 325 TABLET ORAL at 01:46

## 2021-08-04 RX ADMIN — SODIUM BICARBONATE 325 MG: 325 TABLET ORAL at 13:16

## 2021-08-04 RX ADMIN — HYDROMORPHONE HYDROCHLORIDE 1 MG: 1 SOLUTION ORAL at 19:38

## 2021-08-04 RX ADMIN — Medication 40 MG: at 09:30

## 2021-08-04 RX ADMIN — FOLIC ACID 1 MG: 1 TABLET ORAL at 09:31

## 2021-08-04 RX ADMIN — HYDROMORPHONE HYDROCHLORIDE 2 MG: 1 SOLUTION ORAL at 04:56

## 2021-08-04 RX ADMIN — BENZONATATE 100 MG: 100 CAPSULE ORAL at 14:30

## 2021-08-04 RX ADMIN — SODIUM BICARBONATE 325 MG: 325 TABLET ORAL at 05:13

## 2021-08-04 RX ADMIN — HYDROMORPHONE HYDROCHLORIDE 2 MG: 1 SOLUTION ORAL at 08:13

## 2021-08-04 RX ADMIN — Medication 550 MG: at 20:24

## 2021-08-04 RX ADMIN — MELATONIN TAB 3 MG 6 MG: 3 TAB at 00:00

## 2021-08-04 RX ADMIN — SODIUM BICARBONATE 325 MG: 325 TABLET ORAL at 08:12

## 2021-08-04 RX ADMIN — HYDROMORPHONE HYDROCHLORIDE 2 MG: 1 SOLUTION ORAL at 00:00

## 2021-08-04 RX ADMIN — HYDROMORPHONE HYDROCHLORIDE 2 MG: 1 SOLUTION ORAL at 14:56

## 2021-08-04 RX ADMIN — LACTULOSE 20 G: 10 SOLUTION ORAL; RECTAL at 09:32

## 2021-08-04 ASSESSMENT — MIFFLIN-ST. JEOR: SCORE: 1597.5

## 2021-08-04 ASSESSMENT — ACTIVITIES OF DAILY LIVING (ADL)
ADLS_ACUITY_SCORE: 21

## 2021-08-04 NOTE — PLAN OF CARE
Neuro: A/Ox4. Able to follow commands.  Cardiac: ST with -120. VSS.   Respiratory: O2 sats stable on 2-3L NC. Satting > 96% Dyspnea on exertion, denies at rest.  GI/: Anuric, on HD. BM x1 following scheduled lactulose. G-tube to gravity, J-tube with continuous enteral feed. C/o abdominal discomfort/intermittent nausea. Emesis x1. Zofran q6h prn.  Diet/appetite: NPO except ice chips. Continuous TF; currently at 50ml/hr. (Goal 65). Q4 enzymes.  Activity:  Ax lift.  Pain: At acceptable level on current regimen. Prn dilaudid q3h for back/abdominal pain.  Skin: Intact, no new deficits noted. See flowsheets.  LDA's: L triple internal jugular WDL. Infusing TKO/atb. Abdominal drain CDI, PEG tube CDI. R HD CVC wdl.    Plan: Continue with POC. Notify primary team with changes.

## 2021-08-04 NOTE — PLAN OF CARE
"D/I/A:    HEENT: Jaundiced sclera   Neuro/Mental: A&O. Afebrile. C/o pain in belly, dilaudid given prior to meds. Refusal of a lot of cares and pushing off lactulose dosages- states \"wants it when he wants it,\" and that \"it makes him burp and gives him frequent diarrhea,\" discussed with team and discussed with pt. Discussed importance of medication and try to meet pt's needs asap (bedpan, ect).   Resp:LS clear  Oxygen: RA to 2LPM. Attempted to wean off to RA but in mid 80s. CTM.   CV:Sinus Tach in 110-120s. Pulses: +2 radial/+2 pedal. MAPs in high 60s. Edema: +2 in ankles/feet  Lines: Central: PICC x3, HD line for dialysis WNL  GI/Nutrition: BS+, Flatus+,  Multiple BM+, loose watery, light brown. NPO except for ice chips, N/V x2. Awaiting ABX per team to assess for ileus vs. other differential.  Renal/CRRT:Anuric   Endo: BG stable, CTM   Skin: Intact except for peeling feet, jaundices. Preventative sacral foam dressing to sacral-coccyx area with blanchable rednesss. Refusing turns, either on right side or supine, educated on the importance of turning.  Other/Labs: Refusing to get up in chair, states he is working on \"his anxiety\" and \"cannot when offered.       P: Continue to monitor. Contact Margwyn 3 with changes  "

## 2021-08-04 NOTE — PROGRESS NOTES
[facility]    Progress Note - Maroon 3 Service        Date of Admission:  6/28/2021    Assessment & Plan   Dax Villareal is a 30 year old male with hx of alcohol use disorder admitted on 6/28/2021 after recent prolonged admission at [facility] in [place] who has severe acute alcoholic hepatitis c/b HE, ESRD requiring HD, and malnutrition in the setting of acute necrotizing pancreatitis, s/p PEG-J placement on 7/13. He arrived with acute respiratory failure and septic shock that have resolved, and his secondary bacterial peritonitis has been appropriately treated. He requires continued management for ESRD, hepatic encephalopathy, infected pancreatic pseudocyst, nutritional support via PEG-J, and persistent right pleural effusion.     Today:  - Ongoing nausea/vomiting, keep patient NPO. KUB negative for SBO.  - Plan for dialysis on 8/4 and necresectomy on 8/5  - Scheduled diluadid 2 mg q8 hours with 1 mg q4 PRN     # Necrotizing alcoholic pancreatitis w/ infected pseudocyst s/p RP Drain and Cystogastrostomy  # Septic Shock, resolved   # Leukocytosis    Admitted to the [facility] ICU on 6/28/2021 from [facility] in [place] for septic shock due to necrotizing pancreatitis. CT on admit showing mature collection with enhancing wall measuring ~[id number]. IR placed perc drain 6/29. Repeat imaging 7/18 revealed walled off necrotic collection amenable to endoscopic transluminal drainage with cystgastrostomy stenting with necrosectomy on 7/21/21.  7/28 - EGD, stent replacement (transluminal Axios with coaxial Solus), PEG-J removal (buried bumper) and replacement of new PEG-J with one new T-tag      - Continue Zosyn through endoscopic debridement on 8/6 per GI  - Repeat necrosectomy and Axios removal/replacement with Dr. Quigley Friday 8/6  - Daily CBC  - MAP goal > 65  - GI following, appreciate recs              - Pancreatic enzymes   - Flush drain with 100ml q4hr per GI   - Blood  cultures and fluid cultures NGTD    # Secondary Bacterial Peritonitis 2/2 infected pseudocyst  # Peripancreatic fluid collection growing VRE   # Lactic acidosis  7/2 - Diagnostic paracentesis (WBC 2663 - 74% PMN, SAAG <1.1, protein 4.1)     Antibiotics:   Current: Zosyn 7/27-present      Past:  - Linezolid 7/21-7/30  - Vancomcyin 6/29  - Meropenem 6/29-7/18  - Micafungin 6/29-7/1  - Cipro 7/19-7/21  - Ceftriaxone 7/21-7/24; 7/26-7/27    - Paracentesis PRN        # Right hepatic hydrothorax  Intermittently increased subjective shortness of breath. CXR on 7/19 w/ increased RLL haziness; ddx includes pleural effusion v pneumonia v atelectasis. CT on 7/28 showed large volume R hepatic hydrothorax with complete atelectasis of right lung, bedside thora completed 7/28 with labs and cultures.     CXR 7/30 showed moderate R pleural effusion, unchanged from previous and improved aeration of right lung with small remaining pleural effusion on CT. Thora cultures negative.     - IR completed thoracentesis 7/28 - exudative  - ID following: agree to continue with empiric abx, though pleural effusion most likely 2/2 hepatic hydrothorax and pancreatitis. Defer plan for repeat thoracentesis based on symptoms and oxygen requirement. No plan for chest tube given concern for infected pleural effusion     # Severe malnutrition in setting of chronic illness  PEG-J placed 7/13. Patient intermittently refusing tube feeds d/t nausea/vomiting.  - Dietitian consult for new tube feeds, appreciate recs   - Continuous TF per dietician order (advancing slowly) with goal of 65 ml/hr              - Cont panc enzymes   - strict NPO for nausea  - Regular Diet, thin liquids per SLP  - Continue multivitamins  - Pantoprazole 40 mg IV daily  - Zofran PRN     # Alcoholic hepatitis complicated by portal hypertension, ascites, and HE - improving   # Hx of Alcohol use disorder c/b withdrawal   # Anemia  # Coagulopathy   Patient with a history of marked  alcohol use and alcoholic hepatitis in the past. Was given a course of steroids (1 week) during previous admission at OSH. Patient mental status has been stable, alert and oriented x4 after rifaxamin and aggressive lactulose. Not a liver transplant candidate.      - Lactulose 20g QID and PRN and rifaximin 550mg BID - as patient allows  - Trend MELD labs  - Daily folic acid and thiamine  - Delirium precautions  - Melatonin 6mg at bedtime  - Daily CBC   - Transfuse if hemoglobin < 7, platelet < 10 or < 30 with bleeding  - Continue to monitor for signs of bleeding      # Stage III RADHA on HD due to ATN  # ESRD due to ATN  # Hypervolemia causing acute respiratory failure, resolved  Nephrology note 7/21 indicates that patient has been on renal replacement therapy for two months and pt status has most likely transitioned from ARF to ESRD at this point.          > Hemodialysis as per renal reccs              - Midodrine 10 mg PRN before dialysis sessions     > Daily chemistry labs, strict I/O, and daily weights  - Aspiration precautions  - Supplemental oxygen as needed to keep sats above 89%    # Apathy, concern for depression  Patient is intermittently refusing cares due to wanting a 'break,' but also reaffirms that he wants everything done to prolong his life. Mood is depressed, apathetic at times. Patient has multiple stressors including his own medical conditions and family health issues.  - Continue to offer emotional support services such as psychiatry, palliative,      # Goals of Care  Please see additional interval note for in depth details regarding conversation on 7/9 about goals of care. Patient's long term prognosis is still quite poor given his multi organ failure. Care conference on 7/15, pt did not wish to participate. Met with sisters Noni (on phone) and Melyssa and close friend Leroy with palliative, nephrology and general medicine teams today to discuss Dax's current improving status, poor prognosis  and wishes to continue full cares. *   - Hepatology confirmed pt will not be a transplant candidate  - Daily phone updates to sister Noni ([phone])       Diet: Fluid restriction 1200 ML FLUID  NPO for Medical/Clinical Reasons Except for: Meds, Ice Chips  Adult Formula Drip Feeding: Continuous Vital 1.5; Jejunostomy; Goal Rate: 60; mL/hr; Medication - Feeding Tube Flush Frequency: At least 15-30 mL water before and after medication administration and with tube clogging; Amount to Send (Nutrition us...    DVT Prophylaxis: Pneumatic Compression Devices  Rdz Catheter: Not present  Fluids: 10 mL NS TKO  Central Lines: PRESENT  PICC Triple Lumen 06/28/21 Left-Site Assessment: WDL  CVC Double Lumen Right-Site Assessment: WDL  Code Status: Full Code      Disposition Plan   Expected discharge: >7 days, recommended to transitional care unit once  nutrition improved, infected pancreatic pseudocyst is well managed and plan for dialysis is in place.     The patient's care was discussed with the Attending Physician, Dr. Sandi Morrow.    Johny Ortiz  [phone]  [facility]  Securely message with the Vocera Web Console (learn more here)  Text page via [facility] [facility] Paging/Directory  Please see sign in/sign out for up to date coverage information  _____________________________________________________________________    Interval History   -  4L of bilous fluid out G-tube overnight. Urine 75 mL. Afebrile.    Data reviewed today: I reviewed all medications, new labs and imaging results over the last 24 hours.    Physical Exam   Vital Signs: Temp: 97.8  F (36.6  C) Temp src: Oral BP: 101/58 Pulse: 119   Resp: 18 SpO2: 98 % O2 Device: Nasal cannula Oxygen Delivery: 2 LPM  Weight: 154 lbs 5.15 oz  Gen: Lying down in bed, alert, appears similar to yesterday.   HEENT: Conjunctival icterus  CV: Tachycardic. Regular rhythm.   Resp: Left lung CTA, absent breath sounds in RML/RLL with decreased  breath sounds at RUL, on 2LPM for comfort  Abd: Allowed for abdominal examination: Tender in epigastric area. Hypoactive bowel sounds. RP drain, GJ-tube in place with buttons in place  Skin: Jaundiced.  Ext: Well perfused, no edema  Neuro: Oriented x3, responding appropriately to questions. Diffusely weak    Data   Recent Labs   Lab 08/04/21  0347 08/03/21  0620 08/02/21  1720 08/02/21  0603 08/01/21  1414 07/31/21  0603 07/30/21  0752 07/30/21  0752   WBC 22.8* 18.3* 15.6* 15.7*  15.7* 14.0* 15.2*  --  18.2*   HGB 8.7* 8.2* 8.1* 8.1*  8.1* 8.1* 8.2*  --  8.7*   MCV 92 92 92 94  94 93 96  --  93    138* 125* 110*  110* 98* 124*  --  148*   INR  --   --   --   --  1.69* 1.73*  --  1.62*   * 133  --  131* 134 133  --  136   POTASSIUM 3.2* 3.4  --  3.3* 3.0* 4.1  --  3.1*   CHLORIDE 97 94  --  96 97 100  --  103   CO2 25 24  --  26 28 21  --  23   BUN 14 33*  --  26 22 34*  --  25   CR 2.53* 4.29*  --  3.21* 2.68* 3.35*  --  2.49*   ANIONGAP 10 15*  --  9 9 12  --  10   JANENE 8.6 8.6  --  8.3* 8.2* 8.9  --  8.6   * 135*  --  134* 131* 68*   < > 100*   ALBUMIN 2.3* 1.8*  --  1.8* 2.0* 1.9*  --  1.8*   PROTTOTAL 8.0 7.0  --  6.8 7.3 6.6*  --  7.0   BILITOTAL 4.1* 3.7*  --  3.7* 4.2* 4.6*  --  4.4*   ALKPHOS 136 124  --  118 122 111  --  129   ALT 16 14  --  14 15 14  --  16   AST 34 29  --  26 28 38  --  46*    < > = values in this interval not displayed.     No results found for this or any previous visit (from the past 24 hour(s)).

## 2021-08-04 NOTE — PROGRESS NOTES
Nephrology Progress Note  08/04/2021         Dax Villareal is a 30 year old male with h/o ETOH hepatitis, end stage liver disease, RADHA on HD, transferred from [facility] in [place] for septic shock on 6/28/21 for treatment of necrotizing pancreatitis and decompensated liver disease. He was initially admitted to [facility] 5/19/21.        Interval History :   Mr Villareal had GI stenting of pancreas for second time on 7/28, continues to have significant abd pain which is limiting activity and ability to eat but hemodynamically has done fairly well.  Had HD yesterday and pulled 2.5L of UF, also with increasing NG output which is making him net negative on his own.  Plan for HD tomorrow.        Assessment & Recommendations:   RADHA-Cr was 0.8 as recently as 5/19/2021 but now HD dependent, started RRT at [facility] prior to transfer to [facility] (exact date unclear but per family it was end of May).  Etiology likely multifactorial with contrast, sepsis/pancreatitis, likely HRS physiology contributing as well.  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  Continuing RRT as long as it is tolerated with regards to BP's, is functional enough to possibly do outpt HD.  MELD is in high 30's so has high 3 month mortality but continuing restorative cares for now.  Had GI stenting of pancreas on 7/21 and 7/28.                 -Line is TDC from PTA               -Holding on HD today, will plan on HD tomorrow on TTS schedule.       Volume status-Anuric, had HD yesterday and pulled 2.5L of UF, had ~4L of NG output yesterday as well.  Net negative mainly with GI losses so far today.       Electrolytes/pH-K 3.2 and Na 132, on fluid restriction for hyponatremia.  Improving Na and volume status.       Ca/phos/pth-Ca 8.6, corrects to normal with low albumin.  Mg and Phos WNL.       Anemia-Hgb 8.7, acute management per team, last PRBC's 7/14.       Nutrition-Taking PO, appetite up and down.       Discussed with  "Dr Kelley      Recommendations were communicated to primary team via verbal communication.     SOFIA Marlow CNS  Clinical Nurse Specialist  [phone]    Review of Systems:   I reviewed the following systems:  Gen: No fevers or chills  CV: No CP at rest  Resp: No SOB at rest  GI: No N/V      Physical Exam:   I/O last 3 completed shifts:  In: 2120 [P.O.:360; I.V.:200; Other:600; NG/GT:150]  Out: 6550 [Urine:25; Emesis/NG output:3725; Drains:300; Other:2500]   /57   Pulse (!) 123   Temp 98.2  F (36.8  C) (Oral)   Resp 18   Ht 1.676 m (5' 5.98\")   Wt 70 kg (154 lb 5.2 oz)   SpO2 94%   BMI 24.92 kg/m       GENERAL APPEARANCE: Mild distress, + abd pain.    EYES:  scleral icterus, pupils equal  Pulmonary: lungs clear to auscultation. Off supplemental oxygen   CV: tachy   - Edema - no LE edema  GI: large distended, tender. Has retroperitoneal drain  MS: no evidence of inflammation in joints, no muscle tenderness  SKIN: no rash, warm, dry, no cyanosis  NEURO: alert/interactive  ACCESS: Right TDC    Labs:   All labs reviewed by me  Electrolytes/Renal - Recent Labs   Lab Test 08/04/21 0347 08/03/21  0620 08/02/21  0603   * 133 131*   POTASSIUM 3.2* 3.4 3.3*   CHLORIDE 97 94 96   CO2 25 24 26   BUN 14 33* 26   CR 2.53* 4.29* 3.21*   * 135* 134*   JANENE 8.6 8.6 8.3*   MAG 2.0 2.4* 2.4*   PHOS 2.9 3.1 2.7       CBC -   Recent Labs   Lab Test 08/04/21 0347 08/03/21  0620 08/02/21  1720   WBC 22.8* 18.3* 15.6*   HGB 8.7* 8.2* 8.1*    138* 125*       LFTs -   Recent Labs   Lab Test 08/04/21 0347 08/03/21  0620 08/02/21  0603   ALKPHOS 136 124 118   BILITOTAL 4.1* 3.7* 3.7*   ALT 16 14 14   AST 34 29 26   PROTTOTAL 8.0 7.0 6.8   ALBUMIN 2.3* 1.8* 1.8*       Iron Panel -   Recent Labs   Lab Test 07/19/21  0632   IRON 31*   IRONSAT 43           Current Medications:    sodium bicarbonate  325 mg Per Feeding Tube Q4H    And     amylase-lipase-protease  1-2 capsule Per Feeding Tube Q4H     " amylase-lipase-protease  2 capsule Oral TID w/meals     B and C vitamin Complex with folic acid  5 mL Per Feeding Tube Daily     folic acid  1 mg Oral Daily    Or     folic acid  1 mg Intravenous Daily     lactulose  20 g Oral or NG Tube 4x Daily     melatonin  6 mg Oral At Bedtime     pantoprazole  40 mg Oral QAM AC     piperacillin-tazobactam  2.25 g Intravenous Q6H     rifaximin  550 mg Oral BID     sodium chloride (PF)  100 mL Irrigation Q4H LENKA     sodium chloride (PF)  3 mL Intracatheter Q8H     sodium chloride (PF)  3 mL Intracatheter Q8H     vitamin B1  100 mg Oral Daily    Or     thiamine  100 mg Intravenous Daily       - MEDICATION INSTRUCTIONS -       Amparo Albarran, saw and evaluated this patient as part of a shared visit.  I have reviewed and discussed with the advanced practice provider their history, physical and plan.    I personally reviewed the vital signs, medications, labs and imaging.    My key history or physical exam findings:  RADHA , complicated pancreatitis  Key management decisions made by me:  Dialysis for solute and volume control, next treatment tomorrow    Amparo Kelley  Date of Service (when I saw the patient): 8/4

## 2021-08-04 NOTE — PROGRESS NOTES
[facility] PROGRESS NOTE     Patient:  Dax Villareal   YOB: 1990, MRN: [id number]  Date of Visit: 08/04/2021  Date of Admission: 6/28/2021  Consult Requester: Kun Taylor,*          Assessment and Recommendations:   ID Problem List:  1. Pleural effusion   - Cx - neg for aerobic/ anaerobic 7/28/21  2. Lactic acidosis  3. Acute necrotizing pancreatitis s/p cystgastrostomy stenting with necrosectomy 7/21   - abdominal abscess VRE  7/14/21  4. RADHA requiring iHD  5. Decompensated ESLD c/b HE  6. Alcoholic hepatitis  7. Ascites    - peritoneal fluid - cx neg on 7/30/21  8. Sputum Enterococcus faecium ( VSE) and C.albicans     Recommendations:   1. Continue Zosyn for now   2. Possible necrosectomy next week, per GI    Discussion:  Dax Villareal is a 30 year old male with PMHx significant for alcohol use disorder who was admitted to [facility] in [place], [place] for abdominal pain, nausea and vomiting, found to have end stage liver disease c/b hepatic encephalopathy, acute renal failure requiring iHD, SBP and acute necrotizing pancreatitis. ID last signed off 7/23 with recs to transition from broad spectrum antibiotics back to po cipro on 7/26 (see previous note for additional detail).     ID was called back because patient has since undergone R thoracentesis on 7/28 with findings consistent with exudative effusion with an LDH and glucose that were overall were not consistent with empyema and the gross fluid appearance was not noted to appear as pus either, though a pH was not sent. He has had intermittently elevated lactate and so primary team has patient on empiric linezolid and zosyn currently. He otherwise remains afebrile, hemodynamically stable. Overall WBC is improved during admission although continues to have leukocytosis likely related to ongoing necrotizing pancreatitis. Repeat CXR 7/28 (after thora) showing continued moderate sized R pleural effusion;  large effusion on CXR 8/2. CT AP notes improved aeration of right lung with mmixed GGOs in the right middle lung that could represent sequela of reexpansion, although infection not excluded. If pneumonia is a concern would recheck sputum sample. Previously grown VSE faecium has not been adequately treated with >1 week of linezolid. Suspect the exudative effusion is due to combination of hepatic hydrothorax and pancreatitis, cultures have been negative thus far with most recent cx on 7/28. Continue Zosyn for now.     Millie Talamantes PA-C  Pronouns: she/her/hers  Infectious Diseases  Pager # [phone]         Interval History and Events:     Feeling fairly well this morning. Wanting to be able to have sips of liquids if possible- will ask team to clarify. Abd pain is stable compared to yesterday. Tolerating tube feeds well thus far, no nausea or vomiting this AM. Several stools yesterday. Afebrile. Minimal cough, nonproductive. Breathing feels good on 2L (previously 5-6L).         Antimicrobial Treatment:   Current:  - Zosyn 7/27-present     Past:  - Linezolid 7/21-7/30  - Vancomcyin 6/29  - Meropenem 6/29-7/18  - Micafungin 6/29-7/1  - Cipro 7/19-7/21  - Ceftriaxone 7/21-7/24; 7/26-7/27         Review of Systems:   Targeted 4 point ROS was completed with pertinent positives and negatives are detailed above.         HPI:   Adopted from initial consult note:    Dax Villareal is a 30 year old male with PMHx significant for alcohol use disorder who was admitted to [facility] in [place], [place] for abdominal pain, nausea and vomiting, found to have end stage liver disease c/b hepatic encephalopathy, acute renal failure requiring iHD, SBP and acute necrotizing pancreatitis.      He required respiratory support (BIPAP, ? Intubation) for respiratory failure and appeared to be in sepsis (lactate reported at 7.7 with abnormal LFTs). He was transferred to [facility] for prolonged admission and reportedly treated with  "steroids; kidney function and pancreatic enzymes normal on admission). Length of stay stated >40 days. CT imaging 6/19 with concern for necrotizing pancreatitis with follow up CT scan 6/25 with peripancreatic fluid collection and 9 cm soft tissue/hemorrhagic structure c/f internal hemorrhage of a pseudocyst. Given multiple antibiotics (Cefepime, Flagyl, Ciprofloxacin, micafungin). Hospital course reportedly complicated by hypoxemic respiratory failure requiring BIPAP, ARF requiring iHD, hepatic encephalopathy, and septic shock 2/2 necrotizing pancreatitis/pancreatic pseudocyst requiring pressor support. Ultimately transferred to [facility] for interventions 6/28. Admitted to the ICU.      Now status post perc IR drain placement 6/29 with cultures that remain NGTD/negative. No endoscopic drainage by GI was pursued given improvement in imaging 7/11. Additionally has had 3 paracenteses (7/2, 7/5, and 7/13 with negative culture work up. Had PEGJ placed 7/13.      He is an outdoorsman. Picks mushrooms, goes fishing/hunting, and hiking. Has not been since \"last spring\". Denies feeling ill prior to admission to OSH. Feeling fairly well currently although notes diffuse abdominal pain. Has had multiple procedures within the past few days and states he feels like he needs a \"rest day\". Denies fever/chills. No URI sx. Is having diarrhea, but on lactulose. Anuric.           Physical Examination:   Temp:  [97.8  F (36.6  C)-98.8  F (37.1  C)] 97.8  F (36.6  C)  Pulse:  [107-137] 119  Resp:  [13-27] 18  BP: ()/(51-72) 101/58  SpO2:  [95 %-100 %] 98 %    I/O last 3 completed shifts:  In: 2120 [P.O.:360; I.V.:200; Other:600; NG/GT:150]  Out: 6550 [Urine:25; Emesis/NG output:3725; Drains:300; Other:2500]    Vitals:    08/01/21 0319 08/02/21 0457 08/03/21 0511 08/03/21 1637   Weight: 72 kg (158 lb 11.7 oz) 72 kg (158 lb 11.7 oz) 71.5 kg (157 lb 10.1 oz) 71.5 kg (157 lb 10.1 oz)    08/04/21 0636   Weight: 70 kg (154 lb 5.2 oz) " "      Constitutional:Young adult male seen lying supine in bed,in NAD.   HEENT: NC/AT, EOMI, scleral icterus, conjunctiva normal, NC in place, OP with MMM  Respiratory: CTAB. No increased work of breathing on 2L this AM.  GI: BS + soft, G tube and left lateral drain in place  Skin: Warm, dry, well-perfused. No bruising, bleeding, rashes, or lesions on limited exam.  Neurologic: A&O. Answers questions appropriately, speech normal.   Neuropsychiatric: Calm, flat. Affect appropriate to situation.  Vascular access: PICC LUE, dialysis CVC R chest both sites non-erythematous.         Medications:       sodium bicarbonate  325 mg Per Feeding Tube Q4H    And     amylase-lipase-protease  1-2 capsule Per Feeding Tube Q4H     amylase-lipase-protease  2 capsule Oral TID w/meals     B and C vitamin Complex with folic acid  5 mL Per Feeding Tube Daily     folic acid  1 mg Oral Daily    Or     folic acid  1 mg Intravenous Daily     lactulose  20 g Oral or NG Tube 4x Daily     melatonin  6 mg Oral At Bedtime     pantoprazole  40 mg Oral QAM AC     piperacillin-tazobactam  2.25 g Intravenous Q6H     potassium chloride  10 mEq Intravenous Once     rifaximin  550 mg Oral BID     sodium chloride (PF)  100 mL Irrigation Q4H LENKA     sodium chloride (PF)  3 mL Intracatheter Q8H     sodium chloride (PF)  3 mL Intracatheter Q8H     vitamin B1  100 mg Oral Daily    Or     thiamine  100 mg Intravenous Daily       Antiinfectives:  Anti-infectives (From now, onward)    Start     Dose/Rate Route Frequency Ordered Stop    07/27/21 2230  piperacillin-tazobactam (ZOSYN) 2.25 g vial to attach to  ml bag     Note to Pharmacy: For [facility], [facility] and [facility]: For Zosyn-naive patients, use the \"Zosyn initial dose + extended infusion\" order panel.    2.25 g  over 30 Minutes Intravenous EVERY 6 HOURS 07/27/21 2222      07/05/21 0830  rifaximin (XIFAXAN) oral suspension 550 mg      550 mg Oral 2 TIMES DAILY 07/05/21 0757            Infusions/Drips:    - " MEDICATION INSTRUCTIONS -       dextrose              Laboratory Data:     Microbiology:  Culture   Date Value Ref Range Status   07/30/2021 No Growth  Final   07/30/2021 No anaerobic organisms isolated after 4 days  Preliminary   07/30/2021 No growth after 4 days  Preliminary   07/30/2021 No growth after 4 days  Preliminary   07/30/2021 No growth after 4 days  Preliminary   07/28/2021 No Growth  Final   07/28/2021 No growth after 6 days  Preliminary   07/28/2021 No anaerobic organisms isolated  Final   07/27/2021 No Growth  Final   07/27/2021 No Growth  Final   07/26/2021 No Growth  Final   07/26/2021 No Growth  Final   07/21/2021 No Growth  Final   07/19/2021 2+ Candida albicans (A)  Final     Comment:     Susceptibilities not routinely done   07/19/2021 2+ Enterococcus faecium (A)  Final   07/16/2021 4+ Candida albicans (A)  Final     Comment:     Susceptibilities not routinely done   07/16/2021 1+ Normal fausto  Final   07/16/2021 No Growth  Final   07/16/2021 No Growth  Final   07/16/2021 No Growth  Final   07/14/2021 Culture in progress  Final   07/14/2021 4+ Enterococcus faecium VRE (A)  Final     Comment:     Preliminary result, confirmation pending.   07/14/2021 4+ Enterococcus faecium VRE (A)  Final     Comment:     Strain 2   07/14/2021 No anaerobic organisms isolated  Final   07/13/2021 No Growth  Final       Inflammatory Markers    Recent Labs   Lab Test 08/04/21  0347 08/03/21  0620 08/02/21  0603 10/06/20  0927   CRP 89.0* 88.0* 93.0* 16.9*       Metabolic Studies       Recent Labs   Lab Test 08/04/21  0347 08/04/21  0346 08/03/21  0620 08/03/21  0320 08/02/21  0603 08/01/21  1414 08/01/21  0315 07/31/21  0603 07/31/21  0603 07/30/21  0752   *  --  133  --  131* 134  --   --  133 136   POTASSIUM 3.2*  --  3.4  --  3.3* 3.0*  --   --  4.1 3.1*   CHLORIDE 97  --  94  --  96 97  --   --  100 103   CO2 25  --  24  --  26 28  --   --  21 23   ANIONGAP 10  --  15*  --  9 9  --   --  12 10   BUN 14  --   33*  --  26 22  --   --  34* 25   CR 2.53*  --  4.29*  --  3.21* 2.68*  --   --  3.35* 2.49*   GFRESTIMATED 33*  --  17*  --  24* 30*  --   --  23* 33*   *  --  135*  --  134* 131* 159*  --  68* 100*   JANENE 8.6  --  8.6  --  8.3* 8.2*  --   --  8.9 8.6   PHOS 2.9  --  3.1  --  2.7 2.8  --   --  3.5  --    MAG 2.0  --  2.4*  --  2.4* 1.7  --   --  1.6  --    LACT  --  1.9  --  1.8 2.1*  --   --    < >  --  4.2*    < > = values in this interval not displayed.       Hepatic Studies    Recent Labs   Lab Test 08/04/21 0347 08/03/21 0620 08/02/21 0603 08/01/21  1414 07/31/21  0603 07/30/21  0752 07/28/21  1038   BILITOTAL 4.1* 3.7* 3.7* 4.2* 4.6* 4.4*  --    ALKPHOS 136 124 118 122 111 129  --    ALBUMIN 2.3* 1.8* 1.8* 2.0* 1.9* 1.8*  --    AST 34 29 26 28 38 46*  --    ALT 16 14 14 15 14 16  --    LDH  --   --   --   --   --   --  106       Pancreatitis testing    Recent Labs   Lab Test 06/29/21 0410 06/28/21  1839 05/18/21  1220 12/09/20  1504 10/06/20  0927   LIPASE  --  199 381 140 139   TRIG Unsatisfactory specimen - icteric Canceled, Test credited  --   --   --        Hematology Studies      Recent Labs   Lab Test 08/04/21 0347 08/03/21 0620 08/02/21  1720 08/02/21  0603 08/01/21  1414 07/31/21  0603 06/29/21  0410 06/28/21  1839 05/18/21  1220 12/09/20  1504 10/06/20  0927   WBC 22.8* 18.3* 15.6* 15.7*  15.7* 14.0* 15.2*   < > 50.6* 13.2* 4.2 6.9   ANEU  --   --  12.0* 11.9*  --   --   --  44.0* 12.0* 2.1 5.2   ALYM  --   --  0.8 1.3  --   --   --  0.0* 0.6* 1.3 0.8   MEDHAT  --   --  2.2* 2.2*  --   --   --  2.0* 0.4 0.6 0.8   AEOS  --   --  0.3 0.0  --   --   --  0.5 0.0 0.2 0.1   HGB 8.7* 8.2* 8.1* 8.1*  8.1* 8.1* 8.2*   < > 7.6* 16.6 17.2 18.8*   HCT 25.9* 24.6* 25.0* 24.8*  24.8* 25.0* 25.9*   < > 23.4* 44.8 47.7 51.2    138* 125* 110*  110* 98* 124*   < > 127* 222 299 311    < > = values in this interval not displayed.       Arterial Blood Gas Testing    Recent Labs   Lab Test  07/01/21  0014 06/30/21  1716 06/28/21  1839   O2PER 60.0 5L 2L        Body Fluid Studies  Recent Labs   Lab Test 07/30/21  1510 07/28/21  1216 07/21/21  0842 07/16/21  1154 07/13/21  1037 07/05/21  1605 07/02/21  1250 07/02/21  1250   FTYP  --   --   --   --   --  Ascites  --  Ascites   FCOL Yellow* [place]* Yellow*  --   --  Orange   < > Pink   FAPR Turbid* Cloudy* Cloudy*   < >  --  Cloudy   < > Cloudy   FRBC  --  26  --   --   --   --   --   --    FWBC 364 457 453  --   --  1801   < > 2663   FNEU  --  18 71   < >  --  61   < > 74   FLYM  --  48 17   < >  --  32   < > 19   FMONO  --   --  12  --   --  6   < > 7   FBAS  --   --   --   --   --  1  --   --    FALB 1.1  --  1.8   < >   < > 2.6  --  2.0   FTP 3.6 4.7  --   --   --  5.2   < > 4.1   GS  --   --   --   --   --  No organisms seen  Moderate  WBC'S seen    --  No organisms seen  Moderate  WBC'S seen  predominantly PMN's      < > = values in this interval not displayed.       Drug Level Monitoring  Recent Labs   Lab Test 06/30/21  0700   VANCOMYCIN 31.2*       Hepatitis B Testing   Recent Labs   Lab Test 07/29/21  0537 06/30/21  1245   HBCAB Nonreactive  --    HEPBANG Nonreactive Nonreactive       Last check of C difficile  C Difficile Toxin B by PCR   Date Value Ref Range Status   07/17/2021 Negative Negative Final     Comment:     A negative result does not exclude actual disease due to C. difficile and may be due to improper collection, handling and storage of the specimen or the number of organisms in the specimen is below the detection limit of the assay.       COVID-19 Testing  Recent Labs   Lab Test 07/06/21  2030 06/29/21  0559 05/18/21  1203   [id number] Nasopharyngeal Nasopharyngeal Nasopharyngeal   SARSCOVRES NEGATIVE NEGATIVE NEGATIVE              Imaging:       Recent Results (from the past 48 hour(s))   XR Chest Port 1 View    Narrative    EXAM: XR CHEST PORT 1 VIEW  8/2/2021 11:25 AM     HISTORY:  Increased O2 needs, history of hydrothorax        COMPARISON:  Chest radiograph 7/30/2021    FINDINGS:   Portable AP supine view of the chest. Right IJ catheter with distal  tip projecting over superior atrial caval junction. Left upper  extremity PICC tip terminates in superior atriocaval junction. Trachea  is midline. Increasing large right-sided pleural effusion and  associated atelectasis. Stable appearance of right-sided pulmonary  opacities. Right heart border is not well visualized. Left lung field  is clear. No identifiable pneumothorax is seen. Bones are grossly  intact.      Impression    IMPRESSION:   1. Increasing large right-sided pleural effusion and associated  atelectasis.  2. Stable appearance of right lung opacities likely consistent with  infection versus atelectasis.    I have personally reviewed the examination and initial interpretation  and I agree with the findings.    CHELLE STARKS MD         SYSTEM ID:  [id number]

## 2021-08-04 NOTE — PROGRESS NOTES
HEMODIALYSIS TREATMENT NOTE    Date: 8/3/2021  Time: 8:05 PM    Data:  Pre Wt: 71.5 kg (157 lb 10.1 oz)   Desired Wt: 68.5 kg   Post Wt: 69 kg (152 lb 1.9 oz)  Weight change: 2.5 kg  Ultrafiltration - Post Run Net Total Removed (mL): 2500 mL  Vascular Access Status: CVC  patent  Dialyzer Rinse: Streaked, Light  Total Blood Volume Processed: 62.38 L   Total Dialysis (Treatment) Time: 3   Dialysate Bath: K 3, Ca 2.25  Heparin: None    Lab:   No    Interventions:Assessment:  Pt completed 3hrs dialysis tx on K 3, Ca 2.25 bath. Pt was vitally stable with soft BP. 5% albumin was given with dialysis to SBP>100. Tolerated 2.5L fluid removal. CVC site clean, dry, and intact. Max 350-400 achieved with desired AP and . Blood rinsed back without issue. CVC locked with saline and capped with clear guard. Hand off report given to PCN. Pt transferred to the floor in stable condition.     Plan:    Next HD per renal team

## 2021-08-04 NOTE — PLAN OF CARE
Plan of Care [id number]  Neuro:    - A&Ox4    Cardiac:    - Rhythm: ST    - VSS.     Respiratory:    - SPO2 > 90% on 1L.    :   - No Urine this shift.    GI:   - 1 Loose Watery BM    Diet/appetite:    - NPO w/ Tube Feedings    Activity:     - Lift   - Bed Alarm Active.    Pain:    - Dilaudid PRN    Skin:    - No new skin issues.    LDA's:   - 3 Lumen PICC - Cap's Changed   - Hemodialysis Port   - GJ - Tube      - G - Gravity     - J - Tube Feedings   - Abdominal Drain     - Irrigate 100 mL Q4H     - Gravity       Plan:    - Continue with POC.    - Notify primary team with changes.      Plan: Discussed Accutane. Will try Tazorac first Detail Level: Simple Discontinue Regimen: Neutrogena acne wash Otc Regimen: Cetaphil cleanser, CeraVe lotion, anthelios sunscreen or blue lizard

## 2021-08-04 NOTE — PROGRESS NOTES
GASTROENTEROLOGY PROGRESS NOTE      Date of Admission: 6/28/2021  Reason for Admission: pancreatitis/hepatitis    ASSESSMENT/RECOMMENDATIONS:  30 year old male with a history of heavy alcohol use who presented to OSH 5/19 for abdominal pain, nausea and vomiting, admitted for alcohol hepatitis, ESLD, RADHA on HD, septic shock requiring pressors as well as necrotizing pancreatitis with large evolving necrotic collection. Transferred to [facility] for consideration of drainage of presumed infected necrotic collection. S/p EUS-guided necrosectomy (7/21/21).     #. Acute necrotizing pancreatitis with presumed infected walled off necrotic collection s/p percutaneous and endoscopic drainage   #. Septic shock, resolved  #. Leukocytosis  #. Ascites/Peritonitis  Unclear evolution of pancreatitis history but had a recent CT scan with large evolving necrotic collection, presumed infected. No obvious gas in collection. CT on admission to [facility] showing mature collection with enhancing wall measuring ~[id number]. With profound coagulopathy (liver disease + malnutrition +sepsis), INR 3.8 on transfer he was given FFP and Vit K, IR placed perc drain 6/29. Repeat imaging 7/18 with still large walled off necrotic collection that does appear amenable to endoscopic transluminal drainage, underwent cystgastrostomy stenting with necrosectomy on 7/21/21 (see procedure notes for details), large amount of solid debris identified. Would recommend ongoing discussions with the patient regarding goals of care.  Etiology: alcohol  Date of onset: 5/19  BISAP score on admission: unclear  Concurrent organ failure: respiratory (now extubated), renal (on HD), liver, cardiogenic (weaned off pressors 6/29)  Thromboses: none  Diabetes: no  Current nutrition access: PEG-J + po diet  Last imaging: CT scan with IV contrast 7/30  Infection/antiinfectives: (Enterococcus faecium VRE from L RP flank drain 7/14)                - Meropenem (6/29 - 7/18), Ciprofloxacin  "(7/19 - 7/21), Vancomycin (6/29 - 6/30), Micafungin (6/29 - 7/1), Ceftriaxone (7/21 - 7/23), Linezolid (7/21 - 7/30), Zosyn (7/27-present)         Intervention:    6/30 - Left IR RP perc drain (24F)    7/2 - Diagnostic paracentesis (WBC 2663 - 74% PMN, SAAG <1.1, protein 4.1)     7/5 - Repeat Paracentesis (WBC 1801 - 61% PMN, lipase 48)    7/13 - Diag/therapeutic paracentesis ()    7/13 - PEG-J placement with T-tag gastropexy    7/21 - Repeat paracentesis    7/21 - EUS-cystgastrostomy and necrosectomy, Axios with co-axial Viabil    7/28 - EGD, stent replacement (transluminal Axios with coaxial Solus), PEG-J removal (buried bumper) and replacement of new PEG-J with one new T-tag    - Repeat necrosectomy and Axios removal/replacement with Dr. Quigley Friday 8/6  - Flush drain with 100ml q4hr (not uncommon that the entirety of the flushes will return in gravity bag because of cystgastrostomy presence)  - PRN paracentesis  - Please continue antibiotics for now until debridements are complete -- would not anticipate a need for prophylactic antibiotics (for SBP) after completion of current course  - pain meds per primary team    #. Severe malnutrition in the context of chronic illness  #. Gastric outlet obstruction s/p PEG-J placement  #. Mild to moderate EPI  Patient with very poor oral intake over the course of his illness and was not meeting calorie requirements orally. S/p PEG-J with T-tag gastropexy (x3) 7/13. High volume G tube output suggests significant gastric outlet obstruction  - Match I/O as best as possible with high volume G tube output  - Strict NPO  - Tube feeds  - G tube to gravity  - Remove new T-tag (placed 7/28) around 8/11  - Continue PERT (defer to dietitian for dosing)    #. Alcohol hepatitis  #. Hyperbilirubinemia  #. Coagulopathy  #. Encephalopathy, improved  Known heavy alcohol use. Reportedly received course of steroids at OSH for 1 week with \"some improvement\". Currently has primarily " "hyperbilirubinemia and mildly elevated AST. Unclear if he has underlying cirrhosis but certainly would fit with alcohol hepatitis vs elevated liver tests of critical illness/sepsis. Not candidate for steroids right now. He is receiving lactulose and Xifaxin for encephalopathy. Hepatology notified of admission and they have weighed in on transplant status 7/22 (Makayla Glass APRN CNP)  - trend MELD labs  - continue lactulose/Xifaxin and titrate to 3 soft stools per day -- please record stool output  - alcohol abstinence  - hepatology team following peripherally    This patient has a very poor overall prognosis and agree with ongoing palliative care consultation and GOC discussions with the family    The patient was seen and discussed and plan agreed upon with GI staff, Dr. Suman Norwood PA-C  Advanced Endoscopy/Pancreaticobiliary GI Service  [facility]  Text Page  ____________________________________________      Subjective: Nursing notes and 24hr events reviewed. Aunt at bedside. Reports that he would like a hot dog or some mexican food. No fevers. Having bowel movements but also high volume G tube output.    ROS:   4 pt ROS negative unless noted in subjective.     Objective:  Blood pressure 108/57, pulse (!) 123, temperature 98.2  F (36.8  C), temperature source Oral, resp. rate 18, height 1.676 m (5' 5.98\"), weight 70 kg (154 lb 5.2 oz), SpO2 94 %.  Gen: Laying in bed. Appears comfortable, fatigued.   HEENT: NCAT. Conjunctiva clear. Sclera +icteric, poor dentition  CV: Peripheral perfusion intact  Resp: non labored breathing  Abd: moderate distention, minimal light brown output in RP perc drain, GJ tube with tube feeds through J and bilious output in G tube  Skin:  + jaundice  Ext: warm, well perfused  Mental status/Psych: alert and oriented, flat affect, appears fatigued    LABS:  BMP  Recent Labs   Lab 08/04/21  0347 08/03/21  0620 08/02/21  0603 08/01/21  1414   * " 133 131* 134   POTASSIUM 3.2* 3.4 3.3* 3.0*   CHLORIDE 97 94 96 97   JANENE 8.6 8.6 8.3* 8.2*   CO2 25 24 26 28   BUN 14 33* 26 22   CR 2.53* 4.29* 3.21* 2.68*   * 135* 134* 131*     CBC  Recent Labs   Lab 08/04/21  0347 08/03/21  0620 08/02/21  1720 08/02/21  0603   WBC 22.8* 18.3* 15.6* 15.7*  15.7*   RBC 2.83* 2.68* 2.73* 2.65*  2.65*   HGB 8.7* 8.2* 8.1* 8.1*  8.1*   HCT 25.9* 24.6* 25.0* 24.8*  24.8*   MCV 92 92 92 94  94   MCH 30.7 30.6 29.7 30.6  30.6   MCHC 33.6 33.3 32.4 32.7  32.7   RDW 15.8* 15.6* 15.8* 15.9*  15.9*    138* 125* 110*  110*     INR  Recent Labs   Lab 08/01/21  1414 07/31/21  0603 07/30/21  0752 07/29/21  0537   INR 1.69* 1.73* 1.62* 1.86*     LFTs  Recent Labs   Lab 08/04/21  0347 08/03/21  0620 08/02/21  0603 08/01/21  1414   ALKPHOS 136 124 118 122   AST 34 29 26 28   ALT 16 14 14 15   BILITOTAL 4.1* 3.7* 3.7* 4.2*   PROTTOTAL 8.0 7.0 6.8 7.3   ALBUMIN 2.3* 1.8* 1.8* 2.0*      PANC  No lab results found in last 7 days.      IMAGING:  EXAMINATION: CT ABDOMEN PELVIS W CONTRAST, 7/30/2021 8:58 AM     TECHNIQUE:  Helical CT images from the lung bases through the  symphysis pubis were obtained with IV contrast. Contrast dose:  iopamidol (ISOVUE-370) solution 100 mL     COMPARISON: 7/27/2021, 7/18/2021     HISTORY: Pancreatitis, necrotizing     FINDINGS:     Abdomen and pelvis:      Ongoing sequela of necrotic pancreatitis. Slightly decreased size of  the now 13.2 x 4.4 cm anterior peripancreatic collection which  previously measured 14.7 x 5.7 cm when measured similarly. This fluid  extends along the anterior portion of the pancreas as well as towards  the splenic hilum bordering the anterior perirenal fascia. Left  lateral approach pigtail surgical drain remains in place.  Repositioning of the cystogastrostomy stent now with double pigtail  catheter coursing through and well-positioned within the lateral  aspect of the collection. There is a smaller pocket of fluid  not  definitely connected with this collection just anterior to the aorta  seen on series 5 image 20 and 66 measuring 4.5 x 4.1 cm, previously  4.7 x 3.6 cm. Fluid extends along the lateral aspects of the abdomen  adjacent to the liver and spleen and into the pelvis similar to prior.  Continued heterogeneous enhancement of the pancreatic head and neck.     Unchanged tiny subcentimeter hypodensities in the liver too small to  characterize. No suspicious hepatic lesions. Gallbladder is  nondistended with reactive appearing pericholecystic fluid. Mild  splenomegaly measuring 13.8 cm similar to prior. Enlarged, edematous,  and hypodense kidney similar to prior. No hydronephrosis. No stones  along the expected course of the ureters. Bladder is incompletely  distended.     Bowel is nondilated. Colon is largely filled with liquid stool with  reactive pericolonic inflammation. Prominent central mesenteric lymph  nodes similar to prior and presumably reactive. Scattered ascites.  Percutaneous gastrojejunostomy has been repositioned with now  appropriate intraluminal position of the balloon.     No abdominal aortic aneurysm. Main portal vein and the portal  confluence appears grossly patent.     Bones: Mild degenerative changes. No suspicious osseous lesions. No  evidence of avascular necrosis.     Lung bases: Improved aeration of the right lung still with small right  pleural effusion and adjacent atelectasis. There are now mixed  groundglass and consolidative opacities in the inferior right middle  lobe, possibly sequela of reexpansion. Partially visualized catheter  is in the right atrium.                                                                      IMPRESSION:   1. Ongoing sequela of necrotizing pancreatitis with slightly decreased  size of the peripancreatic necrotic collection status post  cystogastrostomy stent repositioning. Left lateral approach drain  remains in place  2. Adequate repositioning of the  gastrojejunostomy with balloon in the  stomach lumen.  3. Continued appearance of ATN in the kidneys. No hydronephrosis.  4. Improved aeration of the right lung. Small right pleural effusion  with adjacent atelectasis. Mixed groundglass opacities in the right  middle lobe possibly sequela of reexpansion, although infection is not  excluded.  5. Slight interval improvement in the ascites although it still  remains quite large.

## 2021-08-05 ENCOUNTER — APPOINTMENT (OUTPATIENT)
Dept: CT IMAGING | Facility: CLINIC | Age: 31
End: 2021-08-05
Attending: INTERNAL MEDICINE
Payer: MEDICAID

## 2021-08-05 LAB
ALBUMIN SERPL-MCNC: 2.3 G/DL (ref 3.4–5)
ALP SERPL-CCNC: 147 U/L (ref 40–150)
ALT SERPL W P-5'-P-CCNC: 18 U/L (ref 0–70)
ANION GAP SERPL CALCULATED.3IONS-SCNC: 13 MMOL/L (ref 3–14)
AST SERPL W P-5'-P-CCNC: 41 U/L (ref 0–45)
BILIRUB SERPL-MCNC: 3.9 MG/DL (ref 0.2–1.3)
BUN SERPL-MCNC: 28 MG/DL (ref 7–30)
CALCIUM SERPL-MCNC: 9.4 MG/DL (ref 8.5–10.1)
CHLORIDE BLD-SCNC: 92 MMOL/L (ref 94–109)
CO2 SERPL-SCNC: 24 MMOL/L (ref 20–32)
CREAT SERPL-MCNC: 3.89 MG/DL (ref 0.66–1.25)
CRP SERPL-MCNC: 76 MG/L (ref 0–8)
ERYTHROCYTE [DISTWIDTH] IN BLOOD BY AUTOMATED COUNT: 15.8 % (ref 10–15)
GFR SERPL CREATININE-BSD FRML MDRD: 19 ML/MIN/1.73M2
GLUCOSE BLD-MCNC: 169 MG/DL (ref 70–99)
HCT VFR BLD AUTO: 26.9 % (ref 40–53)
HGB BLD-MCNC: 8.9 G/DL (ref 13.3–17.7)
LACTATE SERPL-SCNC: 2.2 MMOL/L (ref 0.7–2)
LYMPHOCYTES NFR FLD MANUAL: 10 %
MAGNESIUM SERPL-MCNC: 2.3 MG/DL (ref 1.6–2.3)
MCH RBC QN AUTO: 30.1 PG (ref 26.5–33)
MCHC RBC AUTO-ENTMCNC: 33.1 G/DL (ref 31.5–36.5)
MCV RBC AUTO: 91 FL (ref 78–100)
MONOS+MACROS NFR FLD MANUAL: 2 %
NEUTS BAND NFR FLD MANUAL: 1 %
OTHER CELLS FLD MANUAL: 87 %
PHOSPHATE SERPL-MCNC: 4.1 MG/DL (ref 2.5–4.5)
PLATELET # BLD AUTO: 311 10E3/UL (ref 150–450)
POTASSIUM BLD-SCNC: 3.3 MMOL/L (ref 3.4–5.3)
PROT SERPL-MCNC: 8.9 G/DL (ref 6.8–8.8)
RBC # BLD AUTO: 2.96 10E6/UL (ref 4.4–5.9)
SARS-COV-2 RNA RESP QL NAA+PROBE: NEGATIVE
SODIUM SERPL-SCNC: 129 MMOL/L (ref 133–144)
WBC # BLD AUTO: 27.5 10E3/UL (ref 4–11)

## 2021-08-05 PROCEDURE — 83605 ASSAY OF LACTIC ACID: CPT | Performed by: STUDENT IN AN ORGANIZED HEALTH CARE EDUCATION/TRAINING PROGRAM

## 2021-08-05 PROCEDURE — 250N000013 HC RX MED GY IP 250 OP 250 PS 637: Performed by: INTERNAL MEDICINE

## 2021-08-05 PROCEDURE — U0005 INFEC AGEN DETEC AMPLI PROBE: HCPCS | Performed by: INTERNAL MEDICINE

## 2021-08-05 PROCEDURE — 250N000011 HC RX IP 250 OP 636: Performed by: INTERNAL MEDICINE

## 2021-08-05 PROCEDURE — 99233 SBSQ HOSP IP/OBS HIGH 50: CPT | Performed by: INTERNAL MEDICINE

## 2021-08-05 PROCEDURE — 36592 COLLECT BLOOD FROM PICC: CPT | Performed by: STUDENT IN AN ORGANIZED HEALTH CARE EDUCATION/TRAINING PROGRAM

## 2021-08-05 PROCEDURE — 74177 CT ABD & PELVIS W/CONTRAST: CPT

## 2021-08-05 PROCEDURE — 120N000003 HC R&B IMCU UMMC

## 2021-08-05 PROCEDURE — 83735 ASSAY OF MAGNESIUM: CPT | Performed by: STUDENT IN AN ORGANIZED HEALTH CARE EDUCATION/TRAINING PROGRAM

## 2021-08-05 PROCEDURE — 80053 COMPREHEN METABOLIC PANEL: CPT | Performed by: STUDENT IN AN ORGANIZED HEALTH CARE EDUCATION/TRAINING PROGRAM

## 2021-08-05 PROCEDURE — 99233 SBSQ HOSP IP/OBS HIGH 50: CPT | Mod: 24 | Performed by: INTERNAL MEDICINE

## 2021-08-05 PROCEDURE — 86140 C-REACTIVE PROTEIN: CPT | Performed by: STUDENT IN AN ORGANIZED HEALTH CARE EDUCATION/TRAINING PROGRAM

## 2021-08-05 PROCEDURE — 84100 ASSAY OF PHOSPHORUS: CPT | Performed by: STUDENT IN AN ORGANIZED HEALTH CARE EDUCATION/TRAINING PROGRAM

## 2021-08-05 PROCEDURE — 250N000013 HC RX MED GY IP 250 OP 250 PS 637: Performed by: STUDENT IN AN ORGANIZED HEALTH CARE EDUCATION/TRAINING PROGRAM

## 2021-08-05 PROCEDURE — 90937 HEMODIALYSIS REPEATED EVAL: CPT

## 2021-08-05 PROCEDURE — 999N000044 HC STATISTIC CVC DRESSING CHANGE

## 2021-08-05 PROCEDURE — 99232 SBSQ HOSP IP/OBS MODERATE 35: CPT | Performed by: PHYSICIAN ASSISTANT

## 2021-08-05 PROCEDURE — 87493 C DIFF AMPLIFIED PROBE: CPT

## 2021-08-05 PROCEDURE — 85027 COMPLETE CBC AUTOMATED: CPT | Performed by: STUDENT IN AN ORGANIZED HEALTH CARE EDUCATION/TRAINING PROGRAM

## 2021-08-05 PROCEDURE — 99233 SBSQ HOSP IP/OBS HIGH 50: CPT | Performed by: PHYSICIAN ASSISTANT

## 2021-08-05 PROCEDURE — 250N000009 HC RX 250: Performed by: INTERNAL MEDICINE

## 2021-08-05 PROCEDURE — 258N000003 HC RX IP 258 OP 636: Performed by: CLINICAL NURSE SPECIALIST

## 2021-08-05 PROCEDURE — 74177 CT ABD & PELVIS W/CONTRAST: CPT | Mod: 26 | Performed by: RADIOLOGY

## 2021-08-05 RX ORDER — IOPAMIDOL 755 MG/ML
91 INJECTION, SOLUTION INTRAVASCULAR ONCE
Status: COMPLETED | OUTPATIENT
Start: 2021-08-05 | End: 2021-08-05

## 2021-08-05 RX ORDER — LIDOCAINE 40 MG/G
CREAM TOPICAL
Status: DISCONTINUED | OUTPATIENT
Start: 2021-08-05 | End: 2021-08-06 | Stop reason: HOSPADM

## 2021-08-05 RX ADMIN — HYDROMORPHONE HYDROCHLORIDE 2 MG: 1 SOLUTION ORAL at 08:07

## 2021-08-05 RX ADMIN — SODIUM BICARBONATE 325 MG: 325 TABLET ORAL at 15:07

## 2021-08-05 RX ADMIN — HYDROMORPHONE HYDROCHLORIDE 1 MG: 1 SOLUTION ORAL at 01:50

## 2021-08-05 RX ADMIN — HYDROMORPHONE HYDROCHLORIDE 2 MG: 1 SOLUTION ORAL at 15:11

## 2021-08-05 RX ADMIN — ONDANSETRON 4 MG: 2 INJECTION INTRAMUSCULAR; INTRAVENOUS at 19:34

## 2021-08-05 RX ADMIN — MIDODRINE HYDROCHLORIDE 10 MG: 5 TABLET ORAL at 10:22

## 2021-08-05 RX ADMIN — MELATONIN TAB 3 MG 6 MG: 3 TAB at 21:02

## 2021-08-05 RX ADMIN — Medication 550 MG: at 21:02

## 2021-08-05 RX ADMIN — LACTULOSE 20 G: 10 SOLUTION ORAL; RECTAL at 00:00

## 2021-08-05 RX ADMIN — MELATONIN TAB 3 MG 6 MG: 3 TAB at 00:01

## 2021-08-05 RX ADMIN — CALCIUM CARBONATE (ANTACID) CHEW TAB 500 MG 500 MG: 500 CHEW TAB at 13:56

## 2021-08-05 RX ADMIN — SODIUM BICARBONATE 325 MG: 325 TABLET ORAL at 19:34

## 2021-08-05 RX ADMIN — THIAMINE HCL TAB 100 MG 100 MG: 100 TAB at 08:08

## 2021-08-05 RX ADMIN — PANCRELIPASE 2 CAPSULE: 24000; 76000; 120000 CAPSULE, DELAYED RELEASE PELLETS ORAL at 19:35

## 2021-08-05 RX ADMIN — PIPERACILLIN SODIUM AND TAZOBACTAM SODIUM 2.25 G: 2; .25 INJECTION, POWDER, LYOPHILIZED, FOR SOLUTION INTRAVENOUS at 15:08

## 2021-08-05 RX ADMIN — LACTULOSE 20 G: 10 SOLUTION ORAL; RECTAL at 15:15

## 2021-08-05 RX ADMIN — Medication 550 MG: at 08:08

## 2021-08-05 RX ADMIN — HYDROXYZINE HYDROCHLORIDE 50 MG: 25 TABLET, FILM COATED ORAL at 18:19

## 2021-08-05 RX ADMIN — SODIUM BICARBONATE 325 MG: 325 TABLET ORAL at 08:08

## 2021-08-05 RX ADMIN — ONDANSETRON 4 MG: 4 TABLET, ORALLY DISINTEGRATING ORAL at 04:21

## 2021-08-05 RX ADMIN — FOLIC ACID 1 MG: 1 TABLET ORAL at 08:09

## 2021-08-05 RX ADMIN — SODIUM CHLORIDE 250 ML: 9 INJECTION, SOLUTION INTRAVENOUS at 11:13

## 2021-08-05 RX ADMIN — LACTULOSE 20 G: 10 SOLUTION ORAL; RECTAL at 19:34

## 2021-08-05 RX ADMIN — HYDROXYZINE HYDROCHLORIDE 50 MG: 25 TABLET, FILM COATED ORAL at 04:22

## 2021-08-05 RX ADMIN — PANCRELIPASE 2 CAPSULE: 24000; 76000; 120000 CAPSULE, DELAYED RELEASE PELLETS ORAL at 00:01

## 2021-08-05 RX ADMIN — HYDROMORPHONE HYDROCHLORIDE 1 MG: 1 SOLUTION ORAL at 09:56

## 2021-08-05 RX ADMIN — PANCRELIPASE 2 CAPSULE: 24000; 76000; 120000 CAPSULE, DELAYED RELEASE PELLETS ORAL at 08:09

## 2021-08-05 RX ADMIN — HYDROMORPHONE HYDROCHLORIDE 2 MG: 1 SOLUTION ORAL at 00:08

## 2021-08-05 RX ADMIN — Medication 40 MG: at 08:08

## 2021-08-05 RX ADMIN — HYDROMORPHONE HYDROCHLORIDE 1 MG: 1 SOLUTION ORAL at 18:19

## 2021-08-05 RX ADMIN — Medication 5 ML: at 08:08

## 2021-08-05 RX ADMIN — PIPERACILLIN SODIUM AND TAZOBACTAM SODIUM 2.25 G: 2; .25 INJECTION, POWDER, LYOPHILIZED, FOR SOLUTION INTRAVENOUS at 00:01

## 2021-08-05 RX ADMIN — PANCRELIPASE 2 CAPSULE: 24000; 76000; 120000 CAPSULE, DELAYED RELEASE PELLETS ORAL at 04:21

## 2021-08-05 RX ADMIN — HYDROXYZINE HYDROCHLORIDE 50 MG: 25 TABLET, FILM COATED ORAL at 12:15

## 2021-08-05 RX ADMIN — IOPAMIDOL 91 ML: 755 INJECTION, SOLUTION INTRAVENOUS at 16:24

## 2021-08-05 RX ADMIN — HYDROMORPHONE HYDROCHLORIDE 1 MG: 1 SOLUTION ORAL at 13:56

## 2021-08-05 RX ADMIN — PIPERACILLIN SODIUM AND TAZOBACTAM SODIUM 2.25 G: 2; .25 INJECTION, POWDER, LYOPHILIZED, FOR SOLUTION INTRAVENOUS at 21:04

## 2021-08-05 RX ADMIN — PANCRELIPASE 2 CAPSULE: 24000; 76000; 120000 CAPSULE, DELAYED RELEASE PELLETS ORAL at 15:07

## 2021-08-05 RX ADMIN — PIPERACILLIN SODIUM AND TAZOBACTAM SODIUM 2.25 G: 2; .25 INJECTION, POWDER, LYOPHILIZED, FOR SOLUTION INTRAVENOUS at 06:09

## 2021-08-05 RX ADMIN — SODIUM CHLORIDE 300 ML: 9 INJECTION, SOLUTION INTRAVENOUS at 11:13

## 2021-08-05 RX ADMIN — SODIUM BICARBONATE 325 MG: 325 TABLET ORAL at 00:01

## 2021-08-05 RX ADMIN — CALCIUM CARBONATE (ANTACID) CHEW TAB 500 MG 500 MG: 500 CHEW TAB at 15:40

## 2021-08-05 RX ADMIN — SODIUM BICARBONATE 325 MG: 325 TABLET ORAL at 04:21

## 2021-08-05 ASSESSMENT — ACTIVITIES OF DAILY LIVING (ADL)
ADLS_ACUITY_SCORE: 21

## 2021-08-05 ASSESSMENT — MIFFLIN-ST. JEOR: SCORE: 1572.5

## 2021-08-05 NOTE — PROGRESS NOTES
[facility]    Progress Note - [facility] 3 Service        Date of Admission:  6/28/2021    Assessment & Plan   Dax Villareal is a 30 year old male with hx of alcohol use disorder admitted on 6/28/2021 after recent prolonged admission at [facility] in [place] who has severe acute alcoholic hepatitis c/b HE, ESRD requiring HD, and malnutrition in the setting of acute necrotizing pancreatitis, s/p PEG-J placement on 7/13. He arrived with acute respiratory failure and septic shock that have resolved, and his secondary bacterial peritonitis has been appropriately treated. He requires continued management for ESRD, hepatic encephalopathy, infected pancreatic pseudocyst, nutritional support via PEG-J, and persistent right pleural effusion.     Today:  -Checking for C.diff per GI request  -CT abd/pelvis per GI request in preparation for necrosectomy tomorrow  -Continuing antibiotics until debridements are complete     # Necrotizing alcoholic pancreatitis w/ infected pseudocyst s/p RP Drain and Cystogastrostomy  # Septic Shock, resolved   # Leukocytosis    Admitted to the [facility] ICU on 6/28/2021 from [facility] in [place] for septic shock due to necrotizing pancreatitis. CT on admit showing mature collection with enhancing wall measuring ~[id number]. IR placed perc drain 6/29. Repeat imaging 7/18 revealed walled off necrotic collection amenable to endoscopic transluminal drainage with cystgastrostomy stenting with necrosectomy on 7/21/21.  7/28 - EGD, stent replacement (transluminal Axios with coaxial Solus), PEG-J removal (buried bumper) and replacement of new PEG-J with one new T-tag      - Continue Zosyn through endoscopic debridement on 8/6 per GI  - Repeat necrosectomy and Axios removal/replacement with Friday 8/6  - Daily CBC, watching fever curve  - GI following, appreciate recs              - Pancreatic enzymes   - Flush drain with 100ml q4hr per GI   - Blood cultures and  fluid cultures NGTD    # Secondary Bacterial Peritonitis 2/2 infected pseudocyst  # Peripancreatic fluid collection growing VRE   # Lactic acidosis  7/2 - Diagnostic paracentesis (WBC 2663 - 74% PMN, SAAG <1.1, protein 4.1)     Antibiotics:   Current: Zosyn 7/27-present      Past:  - Linezolid 7/21-7/30  - Vancomcyin 6/29  - Meropenem 6/29-7/18  - Micafungin 6/29-7/1  - Cipro 7/19-7/21  - Ceftriaxone 7/21-7/24; 7/26-7/27    - Paracentesis PRN      # Right hepatic hydrothorax  # Acute hypoxic respiratory failure  Intermittently increased subjective shortness of breath. CXR on 7/19 w/ increased RLL haziness; ddx includes pleural effusion v pneumonia v atelectasis. CT on 7/28 showed large volume R hepatic hydrothorax with complete atelectasis of right lung, bedside thora completed 7/28 with labs and cultures.     CXR 7/30 showed moderate R pleural effusion, unchanged from previous and improved aeration of right lung with small remaining pleural effusion on CT. Thora cultures negative.  Overall his oxygen requirements have downtrended but if increasing again, may need to consider repeat thoracentesis     - IR completed thoracentesis 7/28 - exudative  - ID following: agree to continue with empiric abx, though pleural effusion most likely 2/2 hepatic hydrothorax and pancreatitis. Defer plan for repeat thoracentesis based on symptoms and oxygen requirement. No plan for chest tube given concern for infected pleural effusion  - Supplemental oxygen as needed to keep sats above 89%     # Nausea with vomiting + high volume output from G tube  We do expect some fluid to be coming out of G tube due to the cystogastric connection/fluid drainage but 2-4 liters seems excessive. Could be due to slow gastric motility from narcotics. Abd film didn't show obstruction and he is still having bowel movements. Per nurses the gastric output doesn't look like tube feeds. We also worry about gastric outlet obstruction due to edema from all the  inflammation. Also could be ascitic fluid.  -Still should keep him NPO to help him tolerate his nutrition/help with vomiting  -Anti-emetics  -Will discuss other work-up/possible treatments with GI  -Having fluid removal per nephrology due to HD    # Severe malnutrition in setting of chronic illness  PEG-J placed 7/13. Patient intermittently refusing tube feeds d/t nausea/vomiting.  - Dietitian consult for new tube feeds, appreciate recs   - Continuous TF per dietician order (advancing VERY slowly) with goal of 65 ml/hr              - Cont panc enzymes   - strict NPO for nausea/vomiting  - Regular Diet, thin liquids per SLP  - Continue multivitamins  - Pantoprazole 40 mg IV daily  - Zofran PRN     # Alcoholic hepatitis complicated by portal hypertension, ascites, and HE - improving   # Hx of Alcohol use disorder c/b withdrawal   # Anemia  # Coagulopathy   Patient with a history of marked alcohol use and alcoholic hepatitis in the past. Was given a course of steroids (1 week) during previous admission at OSH. Patient mental status has been stable, alert and oriented x4 after rifaxamin and aggressive lactulose. Not a liver transplant candidate.      - Lactulose 20g QID and PRN and rifaximin 550mg BID - as patient allows  - Trend MELD labs  - Daily folic acid and thiamine  - Delirium precautions  - Melatonin 6mg at bedtime  - Daily CBC   - Transfuse if hemoglobin < 7, platelet < 10 or < 30 with bleeding  - Continue to monitor for signs of bleeding      # Stage III RADHA on HD due to ATN  # ESRD due to ATN  # Hypervolemia causing acute respiratory failure, resolved  Nephrology note 7/21 indicates that patient has been on renal replacement therapy for two months and pt status has most likely transitioned from ARF to ESRD at this point.          > Hemodialysis as per renal reccs              - Midodrine 10 mg PRN before dialysis sessions     > Daily chemistry labs, strict I/O, and daily weights  - Aspiration  precautions  - Supplemental oxygen as needed to keep sats above 89%    # Intermittent lactic acidosis  Suspect due to poor lactate clearance due to ESRD and ESLD + ongoing inflammation/infection. No fluids given due to patient being on dialysis    # Apathy, concern for depression  Patient is intermittently refusing cares due to wanting a 'break,' but also reaffirms that he wants everything done to prolong his life. Mood is depressed, apathetic at times. Patient has multiple stressors including his own medical conditions and family health issues.  - Continue to offer emotional support services such as psychiatry, palliative,   - He declined psychiatry/psychology visits     # Goals of Care  Please see additional interval note for in depth details regarding conversation on 7/9 about goals of care. Patient's long term prognosis is still quite poor given his multi organ failure. Care conference on 7/15, pt did not wish to participate. Met with sistermargareth Cheney (on phone) and Melyssa and close friend Leroy with palliative, nephrology and general medicine teams to discuss Dax's current improving status, poor prognosis and wishes to continue full cares. *   - Hepatology confirmed pt will not be a transplant candidate  - Daily phone updates to sister Noni ([phone])       Diet: Fluid restriction 1200 ML FLUID  NPO for Medical/Clinical Reasons Except for: Meds, Ice Chips  Adult Formula Drip Feeding: Continuous Vital 1.5; Jejunostomy; Goal Rate: 60; mL/hr; Medication - Feeding Tube Flush Frequency: At least 15-30 mL water before and after medication administration and with tube clogging; Amount to Send (Nutrition us...    DVT Prophylaxis: Pneumatic Compression Devices  Rdz Catheter: Not present  Fluids: 10 mL NS TKO  Central Lines: PRESENT  PICC Triple Lumen 06/28/21 Left-Site Assessment: WDL  CVC Double Lumen Right-Site Assessment: WDL  Code Status: Full Code      Disposition Plan   Expected discharge: >7 days,  recommended to transitional care unit once  nutrition improved, infected pancreatic pseudocyst is well managed and plan for dialysis is in place.         Sandi Morrow MD  Internal Medicine/Pediatrics [facility]  Securely message with the Vocera Web Console (learn more here)  Text page via [facility] Paging/Directory  Please see sign in/sign out for up to date coverage information  _____________________________________________________________________    Interval History   He triggered sepsis protocol overnight due to leukocytosis, tachycardia. Lactate 2.2. Had HD today. He denied feeling more short of breath. Had ongoing abdominal pain during visit and had just requested dilaudid. No fever/chills. Mountain Dew can at bedside- he states he swished it and spit it out but that helps his hiccups.    4 pt ROS otherwise negative.     Data reviewed today: I reviewed all medications, new labs and imaging results over the last 24 hours.    Physical Exam   Vital Signs: Temp: 97.8  F (36.6  C) Temp src: Oral BP: 103/68 Pulse: (!) 131   Resp: 18 SpO2: 100 % O2 Device: Nasal cannula with humidification Oxygen Delivery: 1 LPM  Weight: 148 lbs 12.97 oz  Constitutional: Awake, alert and in no distress. Resting comfortably in bed in dialysis unit. Pulls blanket over head when provider enters room.   HEENT: Normocephalic. Atraumatic. + Scleral icterus   Cardiovascular: Patient declined auscultation but heart rate in 110s on telemetry monitor  Respiratory: Patient declined auscultation but breathing appears comfortable with normal respiratory rate.   Gastrointestinal: Patient declined abdominal palpation. GJ tube in place  Musculoskeletal: Emaciated  Skin: Jaundiced    Data   Recent Labs   Lab 08/05/21  0536 08/04/21  0347 08/03/21  0620 08/01/21  1414 07/31/21  0603 07/30/21  0752 07/30/21  0752   WBC 27.5* 22.8* 18.3* 14.0* 15.2*  --  18.2*   HGB 8.9* 8.7* 8.2* 8.1* 8.2*  --   8.7*   MCV 91 92 92 93 96  --  93    194 138* 98* 124*  --  148*   INR  --   --   --  1.69* 1.73*  --  1.62*   * 132* 133 134 133  --  136   POTASSIUM 3.3* 3.2* 3.4 3.0* 4.1  --  3.1*   CHLORIDE 92* 97 94 97 100  --  103   CO2 24 25 24 28 21  --  23   BUN 28 14 33* 22 34*  --  25   CR 3.89* 2.53* 4.29* 2.68* 3.35*  --  2.49*   ANIONGAP 13 10 15* 9 12  --  10   JANENE 9.4 8.6 8.6 8.2* 8.9  --  8.6   * 143* 135* 131* 68*   < > 100*   ALBUMIN 2.3* 2.3* 1.8* 2.0* 1.9*  --  1.8*   PROTTOTAL 8.9* 8.0 7.0 7.3 6.6*  --  7.0   BILITOTAL 3.9* 4.1* 3.7* 4.2* 4.6*  --  4.4*   ALKPHOS 147 136 124 122 111  --  129   ALT 18 16 14 15 14  --  16   AST 41 34 29 28 38  --  46*    < > = values in this interval not displayed.     No results found for this or any previous visit (from the past 24 hour(s)).

## 2021-08-05 NOTE — PROGRESS NOTES
HEMODIALYSIS TREATMENT NOTE    Date: 8/5/2021  Time: 3:53 PM    Data:  Pre Wt: 67.5 kg (148 lb 13 oz)   Desired Wt: 64.5 kg   Post Wt: 66 kg (145 lb 8.1 oz)  Weight change: 1.5 kg  Ultrafiltration - Post Run Net Total Removed (mL): 1500 mL  Vascular Access Status: patent  Dialyzer Rinse: Streaked, Light  Total Blood Volume Processed: 67.65 L Liters  Total Dialysis (Treatment) Time: 3.5 Hours    Lab:   no    Interventions/Assessment:  HD via R cvc, 3.5hr run, 350bfr/600dfr, 3k 2.25ca, patient tolerated run successfully removed 1500ml, patient slept for most of run, cvc dressing changed. Hand off report given to PCN.      Plan:    As per renal

## 2021-08-05 NOTE — PROGRESS NOTES
[facility]EAS[facility] PROGRESS NOTE     Patient:  Dax Villareal   YOB: 1990, MRN: [id number]  Date of Visit: 08/05/2021  Date of Admission: 6/28/2021  Consult Requester: Kun Taylor,*          Assessment and Recommendations:   ID Problem List:  1. Pleural effusion   - Cx - neg for aerobic/ anaerobic 7/28/21  2. Lactic acidosis  3. Acute necrotizing pancreatitis s/p cystgastrostomy stenting with necrosectomy 7/21   - abdominal abscess VRE  7/14/21  4. Leukocytosis  5. RADHA requiring iHD  6. Decompensated ESLD c/b HE  7. Alcoholic hepatitis  8. Ascites    - peritoneal fluid - cx neg on 7/30/21  8. Sputum Enterococcus faecium ( VSE) and C.albicans       Recommendations:   1. Continue Zosyn for now   2. Check C.diff today  3. Anticipate necrosectomy tomorrow, 8/6      Discussion:  Dax Villareal is a 30 year old male with PMHx significant for alcohol use disorder who was admitted to [facility] in [place], [place] for abdominal pain, nausea and vomiting, found to have end stage liver disease c/b hepatic encephalopathy, acute renal failure requiring iHD, SBP and acute necrotizing pancreatitis. ID last signed off 7/23 with recs to transition from broad spectrum antibiotics back to po cipro on 7/26 (see previous note for additional detail).     ID was called back because patient has since undergone R thoracentesis on 7/28 with findings consistent with exudative effusion with an LDH and glucose that were overall were not consistent with empyema and the gross fluid appearance was not noted to appear as pus either, though a pH was not sent. He has had intermittently elevated lactate and so primary team has patient on empiric zosyn currently while awaiting necrosectomy procedure. He otherwise remains afebrile, hemodynamically stable. Leukocytosis had been improving and likely related to ongoing necrotizing pancreatitis; increased from 22.8-->27.5 on 8/5. On chart review, having  significant number of liquid stools despite minimal doses of lactulose and relatively stable tube feeds. Check C.diff with rising WBC and stool output. Repeat CXR 7/28 (after thora) showing continued moderate sized R pleural effusion; large effusion on CXR 8/2. CT AP notes improved aeration of right lung with mmixed GGOs in the right middle lung that could represent sequela of reexpansion, although infection not excluded. If pneumonia is a concern would recheck sputum sample. Previously grown VSE faecium has not been adequately treated with >1 week of linezolid. Suspect the exudative effusion is due to combination of hepatic hydrothorax and pancreatitis, cultures have been negative thus far with most recent cx on 7/28. Continue Zosyn for now.     Millie Talamantes PA-C  Pronouns: she/her/hers  Infectious Diseases  Pager # [phone]         Interval History and Events:     Feeling good this morning. Abdominal pain is about the same as yesterday. No nausea or vomiting. Number of stools up significantly over last 24 hours. States he has been asking RNs to hold lactulose. MAR reviewed and has only gotten a total of 4 doses of lactulose in the last 4 days. No fevers, chills, shortness of breath.          Antimicrobial Treatment:   Current:  - Zosyn 7/27-present     Past:  - Linezolid 7/21-7/30  - Vancomcyin 6/29  - Meropenem 6/29-7/18  - Micafungin 6/29-7/1  - Cipro 7/19-7/21  - Ceftriaxone 7/21-7/24; 7/26-7/27         Review of Systems:   Targeted 4 point ROS was completed with pertinent positives and negatives are detailed above.         HPI:   Adopted from initial consult note:    Dax Villareal is a 30 year old male with PMHx significant for alcohol use disorder who was admitted to [facility] in [place], [place] for abdominal pain, nausea and vomiting, found to have end stage liver disease c/b hepatic encephalopathy, acute renal failure requiring iHD, SBP and acute necrotizing pancreatitis.      He required respiratory  "support (BIPAP, ? Intubation) for respiratory failure and appeared to be in sepsis (lactate reported at 7.7 with abnormal LFTs). He was transferred to [facility] for prolonged admission and reportedly treated with steroids; kidney function and pancreatic enzymes normal on admission). Length of stay stated >40 days. CT imaging 6/19 with concern for necrotizing pancreatitis with follow up CT scan 6/25 with peripancreatic fluid collection and 9 cm soft tissue/hemorrhagic structure c/f internal hemorrhage of a pseudocyst. Given multiple antibiotics (Cefepime, Flagyl, Ciprofloxacin, micafungin). Hospital course reportedly complicated by hypoxemic respiratory failure requiring BIPAP, ARF requiring iHD, hepatic encephalopathy, and septic shock 2/2 necrotizing pancreatitis/pancreatic pseudocyst requiring pressor support. Ultimately transferred to [facility] for interventions 6/28. Admitted to the ICU.      Now status post perc IR drain placement 6/29 with cultures that remain NGTD/negative. No endoscopic drainage by GI was pursued given improvement in imaging 7/11. Additionally has had 3 paracenteses (7/2, 7/5, and 7/13 with negative culture work up. Had PEGJ placed 7/13.      He is an outdoorsman. Picks mushrooms, goes fishing/hunting, and hiking. Has not been since \"last spring\". Denies feeling ill prior to admission to OSH. Feeling fairly well currently although notes diffuse abdominal pain. Has had multiple procedures within the past few days and states he feels like he needs a \"rest day\". Denies fever/chills. No URI sx. Is having diarrhea, but on lactulose. Anuric.           Physical Examination:   Temp:  [97.4  F (36.3  C)-98.2  F (36.8  C)] 97.9  F (36.6  C)  Pulse:  [121-127] 121  Resp:  [18-22] 20  BP: (103-114)/(56-70) 108/70  SpO2:  [90 %-99 %] 99 %    I/O last 3 completed shifts:  In: 3400 [P.O.:600; I.V.:430; Other:520; NG/GT:540]  Out: 4510 [Emesis/NG output:4250; Drains:260]    Vitals:    08/02/21 0457 " 08/03/21 0511 08/03/21 1637 08/04/21 0636   Weight: 72 kg (158 lb 11.7 oz) 71.5 kg (157 lb 10.1 oz) 71.5 kg (157 lb 10.1 oz) 70 kg (154 lb 5.2 oz)    08/05/21 0142   Weight: 67.5 kg (148 lb 13 oz)       Constitutional:Young adult male seen lying supine in bed,in NAD.   HEENT: NC/AT, EOMI, scleral icterus, conjunctiva normal, NC in place, OP with MMM  Respiratory: CTAB. No increased work of breathing on 2L this AM.  GI: BS + soft, G tube and left lateral drain in place  Skin: Warm, dry, well-perfused. No bruising, bleeding, rashes, or lesions on limited exam.  Neurologic: A&O. Answers questions appropriately, speech normal.   Neuropsychiatric: Calm, flat. Affect appropriate to situation.  Vascular access: PICC LUE, dialysis CVC R chest both sites non-erythematous.         Medications:       sodium chloride 0.9%  250 mL Intravenous Once in dialysis/CRRT     sodium chloride 0.9%  300 mL Hemodialysis Machine Once     sodium bicarbonate  325 mg Per Feeding Tube Q4H    And     amylase-lipase-protease  1-2 capsule Per Feeding Tube Q4H     amylase-lipase-protease  2 capsule Oral TID w/meals     B and C vitamin Complex with folic acid  5 mL Per Feeding Tube Daily     folic acid  1 mg Oral Daily    Or     folic acid  1 mg Intravenous Daily     HYDROmorphone (STANDARD CONC)  2 mg Oral Q8H     lactulose  20 g Oral or NG Tube 4x Daily     melatonin  6 mg Oral At Bedtime     - MEDICATION INSTRUCTIONS -   Does not apply Once     pantoprazole  40 mg Oral QAM AC     piperacillin-tazobactam  2.25 g Intravenous Q6H     rifaximin  550 mg Oral BID     sodium chloride (PF)  100 mL Irrigation Q4H LENKA     sodium chloride (PF)  3 mL Intracatheter Q8H     sodium chloride (PF)  3 mL Intracatheter Q8H     sodium chloride (PF)  9 mL Intracatheter During Dialysis/CRRT (from stock)     sodium chloride (PF)  9 mL Intracatheter During Dialysis/CRRT (from stock)     vitamin B1  100 mg Oral Daily    Or     thiamine  100 mg Intravenous Daily  "      Antiinfectives:  Anti-infectives (From now, onward)    Start     Dose/Rate Route Frequency Ordered Stop    07/27/21 2230  piperacillin-tazobactam (ZOSYN) 2.25 g vial to attach to  ml bag     Note to Pharmacy: For [facility], [facility] and [facility]: For Zosyn-naive patients, use the \"Zosyn initial dose + extended infusion\" order panel.    2.25 g  over 30 Minutes Intravenous EVERY 6 HOURS 07/27/21 2222 07/05/21 0830  rifaximin (XIFAXAN) oral suspension 550 mg      550 mg Oral 2 TIMES DAILY 07/05/21 0757            Infusions/Drips:    - MEDICATION INSTRUCTIONS -       dextrose              Laboratory Data:     Microbiology:  Culture   Date Value Ref Range Status   07/30/2021 No Growth  Final   07/30/2021 No anaerobic organisms isolated after 5 days  Preliminary   07/30/2021 No growth after 5 days  Preliminary   07/30/2021 No Growth  Final   07/30/2021 No Growth  Final   07/28/2021 No Growth  Final   07/28/2021 No growth after 7 days  Preliminary   07/28/2021 No anaerobic organisms isolated  Final   07/27/2021 No Growth  Final   07/27/2021 No Growth  Final   07/26/2021 No Growth  Final   07/26/2021 No Growth  Final   07/21/2021 No Growth  Final   07/19/2021 2+ Candida albicans (A)  Final     Comment:     Susceptibilities not routinely done   07/19/2021 2+ Enterococcus faecium (A)  Final   07/16/2021 4+ Candida albicans (A)  Final     Comment:     Susceptibilities not routinely done   07/16/2021 1+ Normal fausto  Final   07/16/2021 No Growth  Final   07/16/2021 No Growth  Final   07/16/2021 No Growth  Final   07/14/2021 Culture in progress  Final   07/14/2021 4+ Enterococcus faecium VRE (A)  Final     Comment:     Preliminary result, confirmation pending.   07/14/2021 4+ Enterococcus faecium VRE (A)  Final     Comment:     Strain 2   07/14/2021 No anaerobic organisms isolated  Final   07/13/2021 No Growth  Final       Inflammatory Markers    Recent Labs   Lab Test 08/05/21  0536 08/04/21  0347 08/03/21  0620 08/02/21  0603 " 10/06/20  0927   CRP 76.0* 89.0* 88.0* 93.0* 16.9*       Metabolic Studies       Recent Labs   Lab Test 08/05/21 0536 08/04/21 0347 08/04/21 0346 08/03/21 0620 08/03/21  0320 08/02/21  0603 08/01/21  1414 08/01/21  0315 07/31/21  0603 07/31/21  0603   * 132*  --  133  --  131* 134  --   --  133   POTASSIUM 3.3* 3.2*  --  3.4  --  3.3* 3.0*  --   --  4.1   CHLORIDE 92* 97  --  94  --  96 97  --   --  100   CO2 24 25  --  24  --  26 28  --   --  21   ANIONGAP 13 10  --  15*  --  9 9  --   --  12   BUN 28 14  --  33*  --  26 22  --   --  34*   CR 3.89* 2.53*  --  4.29*  --  3.21* 2.68*  --   --  3.35*   GFRESTIMATED 19* 33*  --  17*  --  24* 30*  --   --  23*   * 143*  --  135*  --  134* 131* 159*  --  68*   JANENE 9.4 8.6  --  8.6  --  8.3* 8.2*  --   --  8.9   PHOS 4.1 2.9  --  3.1   < > 2.7 2.8  --   --  3.5   MAG 2.3 2.0  --  2.4*   < > 2.4* 1.7  --   --  1.6   LACT 2.2*  --  1.9  --   --  2.1*  --   --    < >  --     < > = values in this interval not displayed.       Hepatic Studies    Recent Labs   Lab Test 08/05/21 0536 08/04/21 0347 08/03/21 0620 08/02/21  0603 08/01/21  1414 07/31/21  0603 07/28/21  1038   BILITOTAL 3.9* 4.1* 3.7* 3.7* 4.2* 4.6*  --    ALKPHOS 147 136 124 118 122 111  --    ALBUMIN 2.3* 2.3* 1.8* 1.8* 2.0* 1.9*  --    AST 41 34 29 26 28 38  --    ALT 18 16 14 14 15 14  --    LDH  --   --   --   --   --   --  106       Pancreatitis testing    Recent Labs   Lab Test 06/29/21  0410 06/28/21  1839 05/18/21  1220 12/09/20  1504 10/06/20  0927   LIPASE  --  199 381 140 139   TRIG Unsatisfactory specimen - icteric Canceled, Test credited  --   --   --        Hematology Studies      Recent Labs   Lab Test 08/05/21  0536 08/04/21  0347 08/03/21  0620 08/02/21  1720 08/02/21  0603 08/01/21  1414 06/29/21  0410 06/28/21  1839 05/18/21  1220 12/09/20  1504 10/06/20  0927   WBC 27.5* 22.8* 18.3* 15.6* 15.7*  15.7* 14.0*   < > 50.6* 13.2* 4.2 6.9   ANEU  --   --   --  12.0* 11.9*  --   --   44.0* 12.0* 2.1 5.2   ALYM  --   --   --  0.8 1.3  --   --  0.0* 0.6* 1.3 0.8   MEDHAT  --   --   --  2.2* 2.2*  --   --  2.0* 0.4 0.6 0.8   AEOS  --   --   --  0.3 0.0  --   --  0.5 0.0 0.2 0.1   HGB 8.9* 8.7* 8.2* 8.1* 8.1*  8.1* 8.1*   < > 7.6* 16.6 17.2 18.8*   HCT 26.9* 25.9* 24.6* 25.0* 24.8*  24.8* 25.0*   < > 23.4* 44.8 47.7 51.2    194 138* 125* 110*  110* 98*   < > 127* 222 299 311    < > = values in this interval not displayed.       Arterial Blood Gas Testing    Recent Labs   Lab Test 07/01/21  0014 06/30/21  1716 06/28/21  1839   O2PER 60.0 5L 2L        Body Fluid Studies  Recent Labs   Lab Test 07/30/21  1510 07/28/21  1216 07/21/21  0842 07/16/21  1154 07/13/21  1037 07/05/21  1605 07/02/21  1250 07/02/21  1250   FTYP  --   --   --   --   --  Ascites  --  Ascites   FCOL Yellow* Bryant* Yellow*  --   --  Orange   < > Pink   FAPR Turbid* Cloudy* Cloudy*   < >  --  Cloudy   < > Cloudy   FRBC  --  26  --   --   --   --   --   --    FWBC 364 457 453  --   --  1801   < > 2663   FNEU  --  18 71   < >  --  61   < > 74   FLYM  --  48 17   < >  --  32   < > 19   FMONO  --   --  12  --   --  6   < > 7   FBAS  --   --   --   --   --  1  --   --    FALB 1.1  --  1.8   < >   < > 2.6  --  2.0   FTP 3.6 4.7  --   --   --  5.2   < > 4.1   GS  --   --   --   --   --  No organisms seen  Moderate  WBC'S seen    --  No organisms seen  Moderate  WBC'S seen  predominantly PMN's      < > = values in this interval not displayed.       Drug Level Monitoring  Recent Labs   Lab Test 06/30/21  0700   VANCOMYCIN 31.2*       Hepatitis B Testing   Recent Labs   Lab Test 07/29/21  0537 06/30/21  1245   HBCAB Nonreactive  --    HEPBANG Nonreactive Nonreactive       Last check of C difficile  C Difficile Toxin B by PCR   Date Value Ref Range Status   07/17/2021 Negative Negative Final     Comment:     A negative result does not exclude actual disease due to C. difficile and may be due to improper collection, handling and  storage of the specimen or the number of organisms in the specimen is below the detection limit of the assay.       COVID-19 Testing  Recent Labs   Lab Test 07/06/21  2030 06/29/21  0559 05/18/21  1203   [id number] Nasopharyngeal Nasopharyngeal Nasopharyngeal   SARSCOVRES NEGATIVE NEGATIVE NEGATIVE              Imaging:       Recent Results (from the past 48 hour(s))   XR Abdomen Port 1 View    Narrative    Exam: XR ABDOMEN PORT 1 VIEWS, 8/4/2021 2:55 PM    Indication: concern for ileus    Comparison: CT: 7/30/2021, chest x-ray: 8/2/2021    Findings:   Portable radiograph of the abdomen. Cystgastrostomy stent, unchanged.  Left lateral approach drain, unchanged. Unchanged gastrojejunostomy  tube. Nonobstructive bowel gas pattern. No pneumatosis or portal  venous gas. Kidneys appear enlarged and edematous, better  characterized on recent CT. No aggressive osseous lesions. Right  pleural effusion.       Impression    Impression:  1. Nonobstructive bowel gas pattern.  2. Enlarged and edematous appearance of the kidneys, better  characterized on recent CT.    I have personally reviewed the examination and initial interpretation  and I agree with the findings.    VIKTOR BRUCE MD         SYSTEM ID:  [id number]

## 2021-08-05 NOTE — PLAN OF CARE
[id number]:  Neuro: A/Ox4. Able to follow commands. Sleeping between cares during this time period.  Cardiac: -120. VSS.   Respiratory: O2 sats mid-90s on 2L NC. Dyspnea on exertion.  GI/: Anuric, on HD. G-tube to gravity, J-tube continuous TF. No c/o nausea during this four hours.  Diet/appetite: NPO except ice chips. Continuous TF; currently at 55ml/hr. (to increase to goal of 60mL/hr at 2200.). Q4 enzymes, Na bicarb.  Activity:  Ax lift.  Pain: At acceptable level on current regimen.   Skin: No new deficits noted.  LDA's: L triple lumen PICC. L Abdominal drain, PEG tube. R HD CVC.     Plan: Continue with POC. Notify primary team with changes/concerns.

## 2021-08-05 NOTE — CODE/RAPID RESPONSE
Rapid Response Team Note    Assessment   In assessment a rapid response was called on Dax Villareal due to SIRS/Sepsis trigger. This presentation is likely due to nec panc and worsened by ESLD.     Plan   -  Continue current antibiotic regimen. HD/EGD as planned.  -  The Internal Medicine primary team was paged and currently awaiting a response.  -  Disposition: The patient will remain on the current unit. We will continue to monitor this patient closely.  -  Reassessment and plan follow-up will be performed by the primary team      LIV SPRINGER PA  [facility] RRT AMCOM Job Code Contact #[phone]    Hospital Course   Brief Summary of events leading to rapid response:   RRT called for lactic acid 2.3. VS remain stable. Afebrile. Will have HD today, plan for EGD w/ pancreotomy tomorrow.    Admission Diagnosis:   Pancreatitis [K85.90]     Physical Exam   Temp: 97.4  F (36.3  C) Temp  Min: 97.4  F (36.3  C)  Max: 98.2  F (36.8  C)  Resp: 22 Resp  Min: 18  Max: 22  SpO2: 98 % SpO2  Min: 90 %  Max: 98 %  Pulse: (!) 122 Pulse  Min: 119  Max: 127    No data recorded  BP: 114/65 Systolic (24hrs), Av , Min:101 , Max:114   Diastolic (24hrs), Av, Min:56, Max:66     I/Os: I/O last 3 completed shifts:  In: 3400 [P.O.:600; I.V.:430; Other:520; NG/GT:540]  Out: 4510 [Emesis/NG output:4250; Drains:260]     Exam:   General: chronically ill appearing  Mental Status: baseline mental status.  Resp: No increased WOB    Significant Results and Procedures   Lactic Acid:   Recent Labs   Lab Test 21  0536 21  0346 21  0320 21  2215 21  2116 21  2106 21  2106   LACT 2.2* 1.9 1.8   < >  --   --   --    LACTS  --   --   --   --  1.1 0.9 1.5    < > = values in this interval not displayed.     CBC:   Recent Labs   Lab Test 21  0536 21  0347 21  0620   WBC 27.5* 22.8* 18.3*   HGB 8.9* 8.7* 8.2*   HCT 26.9* 25.9* 24.6*    194 138*        Sepsis Evaluation   The  "patient is known to have an infection.  Dax Villareal meets SIRS criteria but does NOT have a lactate >2 or other evidence of acute organ damage.  These vital sign, lab and physical exam findings constitute a diagnosis of SEPSIS.    Sepsis Time-Zero (time Sepsis diagnosis confirmed): present on admission    Anti-infectives (From now, onward)    Start     Dose/Rate Route Frequency Ordered Stop    07/27/21 2230  piperacillin-tazobactam (ZOSYN) 2.25 g vial to attach to  ml bag     Note to Pharmacy: For [facility], [facility] and [facility]: For Zosyn-naive patients, use the \"Zosyn initial dose + extended infusion\" order panel.    2.25 g  over 30 Minutes Intravenous EVERY 6 HOURS 07/27/21 2222      07/05/21 0830  rifaximin (XIFAXAN) oral suspension 550 mg      550 mg Oral 2 TIMES DAILY 07/05/21 0757          Current antibiotic coverage is appropriate for source of infection.     "

## 2021-08-05 NOTE — PROGRESS NOTES
Nephrology Progress Note  08/05/2021           Dax Villareal is a 30 year old male with h/o ETOH hepatitis, end stage liver disease, RADHA on HD, transferred from [facility] in [place] for septic shock on 6/28/21 for treatment of necrotizing pancreatitis and decompensated liver disease. He was initially admitted to [facility] 5/19/21.        Interval History :   Mr Villareal was seen on HD this am, pulling 2-3L as BP's allow, labs stable.  GI output has increased the past few days so will back off if any hypotension occurs, unlikely to recover given he is ~2 months post starting RRT and has ongoing pancreatitis still.        Assessment & Recommendations:   RADHA-Cr was 0.8 as recently as 5/19/2021 but now HD dependent, started RRT at [facility] prior to transfer to [facility] (exact date unclear but per family it was end of May).  Etiology likely multifactorial with contrast, sepsis/pancreatitis, likely HRS physiology contributing as well.  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  Continuing RRT as long as it is tolerated with regards to BP's, is functional enough to possibly do outpt HD.  MELD is in high 30's so has high 3 month mortality but continuing restorative cares for now.  Had GI stenting of pancreas on 7/21 and 7/28.                 -Line is TDC from PTA               -HD today, 2-3L, running on TTS schedule.        Volume status-Anuric, seen on HD today, planning for 2-3L of UF as tolerated although having increased GI output so would back off for any hypotension.       Electrolytes/pH-K 3.3 and Na 129, on fluid restriction for hyponatremia.  Improving Na and volume status, planning HD today.       Ca/phos/pth-Ca 8.6, corrects to normal with low albumin.  Mg and Phos WNL.       Anemia-Hgb 8.7, acute management per team, last PRBC's 7/14.       Nutrition-Taking PO, appetite up and down.       Discussed with Dr Kelley      Recommendations were communicated to primary team via verbal communication.     Tor  "SOFIA Cisse CNS  Clinical Nurse Specialist  [phone]    Review of Systems:   I reviewed the following systems:  Gen: No fevers or chills  CV: No CP at rest  Resp: No SOB at rest  GI: No N/V    Physical Exam:   I/O last 3 completed shifts:  In: 3400 [P.O.:600; I.V.:430; Other:520; NG/GT:540]  Out: 4510 [Emesis/NG output:4250; Drains:260]   BP 97/53   Pulse (!) 124   Temp 97.9  F (36.6  C) (Oral)   Resp 17   Ht 1.676 m (5' 5.98\")   Wt 67.5 kg (148 lb 13 oz)   SpO2 100%   BMI 24.03 kg/m       GENERAL APPEARANCE: Mild distress, + abd pain.    EYES:  scleral icterus, pupils equal  Pulmonary: lungs clear to auscultation. Off supplemental oxygen   CV: tachy   - Edema - no LE edema  GI: large distended, tender. Has retroperitoneal drain  MS: no evidence of inflammation in joints, no muscle tenderness  SKIN: no rash, warm, dry, no cyanosis  NEURO: alert/interactive  ACCESS: Right TDC    Labs:   All labs reviewed by me  Electrolytes/Renal - Recent Labs   Lab Test 08/05/21 0536 08/04/21 0347 08/03/21  0620   * 132* 133   POTASSIUM 3.3* 3.2* 3.4   CHLORIDE 92* 97 94   CO2 24 25 24   BUN 28 14 33*   CR 3.89* 2.53* 4.29*   * 143* 135*   JANENE 9.4 8.6 8.6   MAG 2.3 2.0 2.4*   PHOS 4.1 2.9 3.1       CBC -   Recent Labs   Lab Test 08/05/21 0536 08/04/21 0347 08/03/21  0620   WBC 27.5* 22.8* 18.3*   HGB 8.9* 8.7* 8.2*    194 138*       LFTs -   Recent Labs   Lab Test 08/05/21 0536 08/04/21 0347 08/03/21  0620   ALKPHOS 147 136 124   BILITOTAL 3.9* 4.1* 3.7*   ALT 18 16 14   AST 41 34 29   PROTTOTAL 8.9* 8.0 7.0   ALBUMIN 2.3* 2.3* 1.8*       Iron Panel -   Recent Labs   Lab Test 07/19/21  0632   IRON 31*   IRONSAT 43           Current Medications:    sodium bicarbonate  325 mg Per Feeding Tube Q4H    And     amylase-lipase-protease  1-2 capsule Per Feeding Tube Q4H     amylase-lipase-protease  2 capsule Oral TID w/meals     B and C vitamin Complex with folic acid  5 mL Per Feeding Tube Daily     " folic acid  1 mg Oral Daily    Or     folic acid  1 mg Intravenous Daily     HYDROmorphone (STANDARD CONC)  2 mg Oral Q8H     lactulose  20 g Oral or NG Tube 4x Daily     melatonin  6 mg Oral At Bedtime     - MEDICATION INSTRUCTIONS -   Does not apply Once     pantoprazole  40 mg Oral QAM AC     piperacillin-tazobactam  2.25 g Intravenous Q6H     rifaximin  550 mg Oral BID     sodium chloride (PF)  100 mL Irrigation Q4H LENKA     sodium chloride (PF)  3 mL Intracatheter Q8H     sodium chloride (PF)  3 mL Intracatheter Q8H     sodium chloride (PF)  9 mL Intracatheter During Dialysis/CRRT (from stock)     sodium chloride (PF)  9 mL Intracatheter During Dialysis/CRRT (from stock)     vitamin B1  100 mg Oral Daily    Or     thiamine  100 mg Intravenous Daily       - MEDICATION INSTRUCTIONS -       dextrose         I, Amparo Kelley, saw and evaluated this patient as part of a shared visit.  I have reviewed and discussed with the advanced practice provider their history, physical and plan.    I personally reviewed the vital signs, medications, labs and imaging.    My key history or physical exam findings:  RADHA on HD, dialysis today  Key management decisions made by me:  Dialysis today, continue monitoring, still quite ill and in pain    Amparo Kelley  Date of Service (when I saw the patient): 8/5

## 2021-08-05 NOTE — PROVIDER NOTIFICATION
08/05/21 0600   Call Information   Date of Call 08/05/21   Time of Call 0652   Name of person requesting the team Vipul COOPER   Title of person requesting team RN   RRT Arrival time 0655   Time RRT ended 0707   Reason for call   Type of RRT Adult   Primary reason for call Sepsis suspected   Sepsis Suspected Elevated Lactate level;Heart Rate > 100   Was patient transferred from the ED, ICU, or PACU within last 24 hours prior to RRT call? No   SBAR   Situation LA 2.2   Background 30 year old male with PMHx significant for alcohol use disorder who has [facility] [place] for acute alcohol hepatitis/end stage liver disease complicated by hepatic encephalopathy, acute renal failure requiring hemodialysis, acute hypoxemic respiratory failure and septic shock secondary to acute necrotizing pancreatitis. Transferred to [facility] ICU on 6/28/2021 for further management of necrotizing pancreatitis    Notable History/Conditions End-Stage disease;Organ failure;Recent surgery   Assessment sleepy but A+O, AVSS   Interventions No interventions   Patient Outcome   Patient Outcome Stabilized on unit   RRT Team   Attending/Primary/Covering Physician Daniela 3   Date Attending Physician notified 08/05/21   Time Attending Physician notified 0652   Physician(s) Deepali DONOVAN   Lead RN Rafael COOPER   RT n/a   Post RRT Intervention Assessment   Post RRT Assessment Stable/Improved   Date Follow Up Done 08/05/21   Time Follow Up Done 0947

## 2021-08-05 NOTE — PLAN OF CARE
Neuro: A&Ox4. Follows commands, intermittently refuses cares and meds. Frequently requesting liquids, pt educated on NPO status.  Cardiac: -120s. VSS.   Respiratory: Sating >92 on 2L NC.  GI/: Several lose BM's. Anuric, on HD.  Diet/appetite: NPO. Continuous TF at 60ml/hr goal rate. Intermittent nausea, zofran given x2  Activity:  Lift assist  Pain: At acceptable level on current regimen.   Skin: No new deficits noted. Barrier cream applied, pulsate mattress in place.  LDA's: PICC-Tko, L abdominal drain, PEG- g to gravity with high output, J w/ TF, HD CVC    Plan: Continue with POC. Notify primary team with changes.    Pt refused weekly covid swab, will retry on day shift.  Dialysis planned for Thursday 8/5.  Electrolytes replaced per protocol

## 2021-08-05 NOTE — PROGRESS NOTES
GASTROENTEROLOGY PROGRESS NOTE      Date of Admission: 6/28/2021  Reason for Admission: pancreatitis/hepatitis    ASSESSMENT/RECOMMENDATIONS:  30 year old male with a history of heavy alcohol use who presented to OSH 5/19 for abdominal pain, nausea and vomiting, admitted for alcohol hepatitis, ESLD, RADHA on HD, septic shock requiring pressors as well as necrotizing pancreatitis with large evolving necrotic collection. Transferred to [facility] for consideration of drainage of presumed infected necrotic collection. S/p EUS-guided necrosectomy (7/21/21).     #. Acute necrotizing pancreatitis with presumed infected walled off necrotic collection s/p percutaneous and endoscopic drainage   #. Septic shock, resolved  #. Leukocytosis  #. Ascites/Peritonitis  Unclear evolution of pancreatitis history but had a recent CT scan with large evolving necrotic collection, presumed infected. No obvious gas in collection. CT on admission to [facility] showing mature collection with enhancing wall measuring ~80b15x15th. With profound coagulopathy (liver disease + malnutrition +sepsis), INR 3.8 on transfer he was given FFP and Vit K, IR placed perc drain 6/29. Repeat imaging 7/18 with still large walled off necrotic collection that does appear amenable to endoscopic transluminal drainage, underwent cystgastrostomy stenting with necrosectomy on 7/21/21 (see procedure notes for details), large amount of solid debris identified. Would recommend ongoing discussions with the patient regarding goals of care.  Etiology: alcohol  Date of onset: 5/19  BISAP score on admission: unclear  Concurrent organ failure: respiratory (now extubated), renal (on HD), liver, cardiogenic (weaned off pressors 6/29)  Thromboses: none  Diabetes: no  Current nutrition access: PEG-J + po diet  Last imaging: CT scan with IV contrast 7/30  Infection/antiinfectives: (Enterococcus faecium VRE from L RP flank drain 7/14)                - Meropenem (6/29 - 7/18), Ciprofloxacin  (7/19 - 7/21), Vancomycin (6/29 - 6/30), Micafungin (6/29 - 7/1), Ceftriaxone (7/21 - 7/23), Linezolid (7/21 - 7/30), Zosyn (7/27-present)         Intervention:    6/30 - Left IR RP perc drain (24F)    7/2 - Diagnostic paracentesis (WBC 2663 - 74% PMN, SAAG <1.1, protein 4.1)     7/5 - Repeat Paracentesis (WBC 1801 - 61% PMN, lipase 48)    7/13 - Diag/therapeutic paracentesis ()    7/13 - PEG-J placement with T-tag gastropexy    7/21 - Repeat paracentesis    7/21 - EUS-cystgastrostomy and necrosectomy, Axios with co-axial Viabil    7/28 - EGD, stent replacement (transluminal Axios with coaxial Solus), PEG-J removal (buried bumper) and replacement of new PEG-J with one new T-tag    - Repeat necrosectomy and Axios removal/replacement with Dr. Quigley Friday 8/6  - Repeat CT scan abd/pelv with IV contrast today  - Flush drain with 100ml q4hr (not uncommon that the entirety of the flushes will return in gravity bag because of cystgastrostomy presence)  - PRN paracentesis  - Check Cdiff today d/t rising WBC and loose stools  - Please continue antibiotics for now until debridements are complete -- would not anticipate a need for prophylactic antibiotics (for SBP) after completion of current course  - pain meds per primary team    #. Severe malnutrition in the context of chronic illness  #. Gastric outlet obstruction s/p PEG-J placement  #. Mild to moderate EPI  Patient with very poor oral intake over the course of his illness and was not meeting calorie requirements orally. S/p PEG-J with T-tag gastropexy (x3) 7/13. High volume G tube output suggests significant gastric outlet obstruction  - Match I/O as best as possible with high volume G tube output  - Strict NPO  - Tube feeds  - G tube to gravity  - Remove new T-tag (placed 7/28) around 8/11  - Continue PERT (defer to dietitian for dosing)    #. Alcohol hepatitis  #. Hyperbilirubinemia  #. Coagulopathy  #. Encephalopathy, improved  Known heavy alcohol use.  "Reportedly received course of steroids at OSH for 1 week with \"some improvement\". Currently has primarily hyperbilirubinemia and mildly elevated AST. Unclear if he has underlying cirrhosis but certainly would fit with alcohol hepatitis vs elevated liver tests of critical illness/sepsis. Not candidate for steroids right now. He is receiving lactulose and Xifaxin for encephalopathy. Hepatology notified of admission and they have weighed in on transplant status 7/22 (Makayla Glass APRN CNP)  - trend MELD labs  - continue lactulose/Xifaxin and titrate to 3 soft stools per day -- please record stool output  - alcohol abstinence  - hepatology team following peripherally    This patient has a very poor overall prognosis and agree with ongoing palliative care consultation and GOC discussions with the family    The patient was seen and discussed and plan agreed upon with GI staff, Dr. Dann Norwood PA-C  Advanced Endoscopy/Pancreaticobiliary GI Service  [facility]  Text Page  ____________________________________________      Subjective: Nursing notes and 24hr events reviewed. Multiple stools overnight, rising WBC      Objective:  Blood pressure 117/59, pulse 120, temperature 97.9  F (36.6  C), temperature source Oral, resp. rate 20, height 1.676 m (5' 5.98\"), weight 67.5 kg (148 lb 13 oz), SpO2 99 %.      LABS:  BMP  Recent Labs   Lab 08/05/21  0536 08/04/21  0347 08/03/21  0620 08/02/21  0603   * 132* 133 131*   POTASSIUM 3.3* 3.2* 3.4 3.3*   CHLORIDE 92* 97 94 96   JANENE 9.4 8.6 8.6 8.3*   CO2 24 25 24 26   BUN 28 14 33* 26   CR 3.89* 2.53* 4.29* 3.21*   * 143* 135* 134*     CBC  Recent Labs   Lab 08/05/21  0536 08/04/21  0347 08/03/21  0620 08/02/21  1720   WBC 27.5* 22.8* 18.3* 15.6*   RBC 2.96* 2.83* 2.68* 2.73*   HGB 8.9* 8.7* 8.2* 8.1*   HCT 26.9* 25.9* 24.6* 25.0*   MCV 91 92 92 92   MCH 30.1 30.7 30.6 29.7   MCHC 33.1 33.6 33.3 32.4   RDW 15.8* 15.8* 15.6* 15.8* "    194 138* 125*     INR  Recent Labs   Lab 08/01/21  1414 07/31/21  0603 07/30/21  0752   INR 1.69* 1.73* 1.62*     LFTs  Recent Labs   Lab 08/05/21  0536 08/04/21  0347 08/03/21  0620 08/02/21  0603   ALKPHOS 147 136 124 118   AST 41 34 29 26   ALT 18 16 14 14   BILITOTAL 3.9* 4.1* 3.7* 3.7*   PROTTOTAL 8.9* 8.0 7.0 6.8   ALBUMIN 2.3* 2.3* 1.8* 1.8*      PANC  No lab results found in last 7 days.      IMAGING:  EXAMINATION: CT ABDOMEN PELVIS W CONTRAST, 7/30/2021 8:58 AM     TECHNIQUE:  Helical CT images from the lung bases through the  symphysis pubis were obtained with IV contrast. Contrast dose:  iopamidol (ISOVUE-370) solution 100 mL     COMPARISON: 7/27/2021, 7/18/2021     HISTORY: Pancreatitis, necrotizing     FINDINGS:     Abdomen and pelvis:      Ongoing sequela of necrotic pancreatitis. Slightly decreased size of  the now 13.2 x 4.4 cm anterior peripancreatic collection which  previously measured 14.7 x 5.7 cm when measured similarly. This fluid  extends along the anterior portion of the pancreas as well as towards  the splenic hilum bordering the anterior perirenal fascia. Left  lateral approach pigtail surgical drain remains in place.  Repositioning of the cystogastrostomy stent now with double pigtail  catheter coursing through and well-positioned within the lateral  aspect of the collection. There is a smaller pocket of fluid not  definitely connected with this collection just anterior to the aorta  seen on series 5 image 20 and 66 measuring 4.5 x 4.1 cm, previously  4.7 x 3.6 cm. Fluid extends along the lateral aspects of the abdomen  adjacent to the liver and spleen and into the pelvis similar to prior.  Continued heterogeneous enhancement of the pancreatic head and neck.     Unchanged tiny subcentimeter hypodensities in the liver too small to  characterize. No suspicious hepatic lesions. Gallbladder is  nondistended with reactive appearing pericholecystic fluid. Mild  splenomegaly measuring  13.8 cm similar to prior. Enlarged, edematous,  and hypodense kidney similar to prior. No hydronephrosis. No stones  along the expected course of the ureters. Bladder is incompletely  distended.     Bowel is nondilated. Colon is largely filled with liquid stool with  reactive pericolonic inflammation. Prominent central mesenteric lymph  nodes similar to prior and presumably reactive. Scattered ascites.  Percutaneous gastrojejunostomy has been repositioned with now  appropriate intraluminal position of the balloon.     No abdominal aortic aneurysm. Main portal vein and the portal  confluence appears grossly patent.     Bones: Mild degenerative changes. No suspicious osseous lesions. No  evidence of avascular necrosis.     Lung bases: Improved aeration of the right lung still with small right  pleural effusion and adjacent atelectasis. There are now mixed  groundglass and consolidative opacities in the inferior right middle  lobe, possibly sequela of reexpansion. Partially visualized catheter  is in the right atrium.                                                                      IMPRESSION:   1. Ongoing sequela of necrotizing pancreatitis with slightly decreased  size of the peripancreatic necrotic collection status post  cystogastrostomy stent repositioning. Left lateral approach drain  remains in place  2. Adequate repositioning of the gastrojejunostomy with balloon in the  stomach lumen.  3. Continued appearance of ATN in the kidneys. No hydronephrosis.  4. Improved aeration of the right lung. Small right pleural effusion  with adjacent atelectasis. Mixed groundglass opacities in the right  middle lobe possibly sequela of reexpansion, although infection is not  excluded.  5. Slight interval improvement in the ascites although it still  remains quite large.

## 2021-08-06 ENCOUNTER — ANESTHESIA EVENT (OUTPATIENT)
Dept: SURGERY | Facility: CLINIC | Age: 31
End: 2021-08-06
Payer: MEDICAID

## 2021-08-06 ENCOUNTER — ANESTHESIA (OUTPATIENT)
Dept: SURGERY | Facility: CLINIC | Age: 31
End: 2021-08-06
Payer: MEDICAID

## 2021-08-06 ENCOUNTER — APPOINTMENT (OUTPATIENT)
Dept: GENERAL RADIOLOGY | Facility: CLINIC | Age: 31
End: 2021-08-06
Attending: INTERNAL MEDICINE
Payer: MEDICAID

## 2021-08-06 LAB
ABO/RH(D): NORMAL
ALBUMIN SERPL-MCNC: 2 G/DL (ref 3.4–5)
ALP SERPL-CCNC: 132 U/L (ref 40–150)
ALT SERPL W P-5'-P-CCNC: 19 U/L (ref 0–70)
ANION GAP SERPL CALCULATED.3IONS-SCNC: 10 MMOL/L (ref 3–14)
ANTIBODY SCREEN: NEGATIVE
AST SERPL W P-5'-P-CCNC: 43 U/L (ref 0–45)
BACTERIA FLD CULT: NORMAL
BILIRUB SERPL-MCNC: 3.4 MG/DL (ref 0.2–1.3)
BUN SERPL-MCNC: 14 MG/DL (ref 7–30)
C DIFF TOX B STL QL: NEGATIVE
CALCIUM SERPL-MCNC: 8.3 MG/DL (ref 8.5–10.1)
CHLORIDE BLD-SCNC: 90 MMOL/L (ref 94–109)
CO2 SERPL-SCNC: 24 MMOL/L (ref 20–32)
CREAT SERPL-MCNC: 2.36 MG/DL (ref 0.66–1.25)
CRP SERPL-MCNC: 57 MG/L (ref 0–8)
ERYTHROCYTE [DISTWIDTH] IN BLOOD BY AUTOMATED COUNT: 16.3 % (ref 10–15)
GFR SERPL CREATININE-BSD FRML MDRD: 35 ML/MIN/1.73M2
GLUCOSE BLD-MCNC: 86 MG/DL (ref 70–99)
GLUCOSE BLDC GLUCOMTR-MCNC: 76 MG/DL (ref 70–99)
GLUCOSE BLDC GLUCOMTR-MCNC: 85 MG/DL (ref 70–99)
HCT VFR BLD AUTO: 24.1 % (ref 40–53)
HGB BLD-MCNC: 8.1 G/DL (ref 13.3–17.7)
HOLD SPECIMEN: NORMAL
INR PPP: 1.68 (ref 0.85–1.15)
LACTATE SERPL-SCNC: 1.9 MMOL/L (ref 0.7–2)
MAGNESIUM SERPL-MCNC: 1.8 MG/DL (ref 1.6–2.3)
MCH RBC QN AUTO: 30.5 PG (ref 26.5–33)
MCHC RBC AUTO-ENTMCNC: 33.6 G/DL (ref 31.5–36.5)
MCV RBC AUTO: 91 FL (ref 78–100)
PHOSPHATE SERPL-MCNC: 3.9 MG/DL (ref 2.5–4.5)
PLATELET # BLD AUTO: 316 10E3/UL (ref 150–450)
POTASSIUM BLD-SCNC: 3.4 MMOL/L (ref 3.4–5.3)
PROT SERPL-MCNC: 7.8 G/DL (ref 6.8–8.8)
RBC # BLD AUTO: 2.66 10E6/UL (ref 4.4–5.9)
SODIUM SERPL-SCNC: 124 MMOL/L (ref 133–144)
SPECIMEN EXPIRATION DATE: NORMAL
WBC # BLD AUTO: 24 10E3/UL (ref 4–11)

## 2021-08-06 PROCEDURE — 0FBG8ZZ EXCISION OF PANCREAS, VIA NATURAL OR ARTIFICIAL OPENING ENDOSCOPIC: ICD-10-PCS | Performed by: INTERNAL MEDICINE

## 2021-08-06 PROCEDURE — 80053 COMPREHEN METABOLIC PANEL: CPT | Performed by: STUDENT IN AN ORGANIZED HEALTH CARE EDUCATION/TRAINING PROGRAM

## 2021-08-06 PROCEDURE — 36592 COLLECT BLOOD FROM PICC: CPT | Performed by: STUDENT IN AN ORGANIZED HEALTH CARE EDUCATION/TRAINING PROGRAM

## 2021-08-06 PROCEDURE — 360N000075 HC SURGERY LEVEL 2, PER MIN: Performed by: INTERNAL MEDICINE

## 2021-08-06 PROCEDURE — 250N000011 HC RX IP 250 OP 636: Performed by: INTERNAL MEDICINE

## 2021-08-06 PROCEDURE — C1876 STENT, NON-COA/NON-COV W/DEL: HCPCS | Performed by: INTERNAL MEDICINE

## 2021-08-06 PROCEDURE — 250N000009 HC RX 250: Performed by: NURSE ANESTHETIST, CERTIFIED REGISTERED

## 2021-08-06 PROCEDURE — 0F9G8ZZ DRAINAGE OF PANCREAS, VIA NATURAL OR ARTIFICIAL OPENING ENDOSCOPIC: ICD-10-PCS | Performed by: INTERNAL MEDICINE

## 2021-08-06 PROCEDURE — 999N000179 XR SURGERY CARM FLUORO LESS THAN 5 MIN W STILLS: Mod: TC

## 2021-08-06 PROCEDURE — 250N000011 HC RX IP 250 OP 636: Performed by: STUDENT IN AN ORGANIZED HEALTH CARE EDUCATION/TRAINING PROGRAM

## 2021-08-06 PROCEDURE — 86140 C-REACTIVE PROTEIN: CPT | Performed by: STUDENT IN AN ORGANIZED HEALTH CARE EDUCATION/TRAINING PROGRAM

## 2021-08-06 PROCEDURE — 250N000009 HC RX 250: Performed by: INTERNAL MEDICINE

## 2021-08-06 PROCEDURE — 250N000013 HC RX MED GY IP 250 OP 250 PS 637: Performed by: INTERNAL MEDICINE

## 2021-08-06 PROCEDURE — 258N000003 HC RX IP 258 OP 636: Performed by: NURSE ANESTHETIST, CERTIFIED REGISTERED

## 2021-08-06 PROCEDURE — 999N000141 HC STATISTIC PRE-PROCEDURE NURSING ASSESSMENT: Performed by: INTERNAL MEDICINE

## 2021-08-06 PROCEDURE — 99232 SBSQ HOSP IP/OBS MODERATE 35: CPT | Performed by: PHYSICIAN ASSISTANT

## 2021-08-06 PROCEDURE — 36592 COLLECT BLOOD FROM PICC: CPT | Performed by: INTERNAL MEDICINE

## 2021-08-06 PROCEDURE — 250N000025 HC SEVOFLURANE, PER MIN: Performed by: INTERNAL MEDICINE

## 2021-08-06 PROCEDURE — 36592 COLLECT BLOOD FROM PICC: CPT | Performed by: PHYSICIAN ASSISTANT

## 2021-08-06 PROCEDURE — 86901 BLOOD TYPING SEROLOGIC RH(D): CPT | Performed by: INTERNAL MEDICINE

## 2021-08-06 PROCEDURE — C1769 GUIDE WIRE: HCPCS | Performed by: INTERNAL MEDICINE

## 2021-08-06 PROCEDURE — 83605 ASSAY OF LACTIC ACID: CPT | Performed by: INTERNAL MEDICINE

## 2021-08-06 PROCEDURE — 250N000011 HC RX IP 250 OP 636: Performed by: NURSE ANESTHETIST, CERTIFIED REGISTERED

## 2021-08-06 PROCEDURE — 272N000001 HC OR GENERAL SUPPLY STERILE: Performed by: INTERNAL MEDICINE

## 2021-08-06 PROCEDURE — 250N000013 HC RX MED GY IP 250 OP 250 PS 637: Performed by: STUDENT IN AN ORGANIZED HEALTH CARE EDUCATION/TRAINING PROGRAM

## 2021-08-06 PROCEDURE — C1726 CATH, BAL DIL, NON-VASCULAR: HCPCS | Performed by: INTERNAL MEDICINE

## 2021-08-06 PROCEDURE — 370N000017 HC ANESTHESIA TECHNICAL FEE, PER MIN: Performed by: INTERNAL MEDICINE

## 2021-08-06 PROCEDURE — 85014 HEMATOCRIT: CPT | Performed by: STUDENT IN AN ORGANIZED HEALTH CARE EDUCATION/TRAINING PROGRAM

## 2021-08-06 PROCEDURE — 710N000010 HC RECOVERY PHASE 1, LEVEL 2, PER MIN: Performed by: INTERNAL MEDICINE

## 2021-08-06 PROCEDURE — 83735 ASSAY OF MAGNESIUM: CPT | Performed by: STUDENT IN AN ORGANIZED HEALTH CARE EDUCATION/TRAINING PROGRAM

## 2021-08-06 PROCEDURE — 120N000003 HC R&B IMCU UMMC

## 2021-08-06 PROCEDURE — 99233 SBSQ HOSP IP/OBS HIGH 50: CPT | Mod: GC | Performed by: INTERNAL MEDICINE

## 2021-08-06 PROCEDURE — 84100 ASSAY OF PHOSPHORUS: CPT | Performed by: STUDENT IN AN ORGANIZED HEALTH CARE EDUCATION/TRAINING PROGRAM

## 2021-08-06 PROCEDURE — 85610 PROTHROMBIN TIME: CPT | Performed by: PHYSICIAN ASSISTANT

## 2021-08-06 DEVICE — STENT SOLUS BILIARY 10FRX01CM DBL PIGTAIL W/INTRO G26829
Type: IMPLANTABLE DEVICE | Site: STOMACH | Status: NON-FUNCTIONAL
Removed: 2021-08-11

## 2021-08-06 RX ORDER — FENTANYL CITRATE-0.9 % NACL/PF 10 MCG/ML
PLASTIC BAG, INJECTION (ML) INTRAVENOUS CONTINUOUS PRN
Status: DISCONTINUED | OUTPATIENT
Start: 2021-08-06 | End: 2021-08-06

## 2021-08-06 RX ORDER — PROPOFOL 10 MG/ML
INJECTION, EMULSION INTRAVENOUS PRN
Status: DISCONTINUED | OUTPATIENT
Start: 2021-08-06 | End: 2021-08-06

## 2021-08-06 RX ORDER — LEVOFLOXACIN 5 MG/ML
INJECTION, SOLUTION INTRAVENOUS PRN
Status: DISCONTINUED | OUTPATIENT
Start: 2021-08-06 | End: 2021-08-06

## 2021-08-06 RX ORDER — MAGNESIUM HYDROXIDE 1200 MG/15ML
LIQUID ORAL
Status: DISCONTINUED
Start: 2021-08-06 | End: 2021-08-07 | Stop reason: HOSPADM

## 2021-08-06 RX ORDER — HYDROMORPHONE HCL IN WATER/PF 6 MG/30 ML
0.2 PATIENT CONTROLLED ANALGESIA SYRINGE INTRAVENOUS EVERY 5 MIN PRN
Status: DISCONTINUED | OUTPATIENT
Start: 2021-08-06 | End: 2021-08-06 | Stop reason: HOSPADM

## 2021-08-06 RX ORDER — SODIUM CHLORIDE, SODIUM LACTATE, POTASSIUM CHLORIDE, CALCIUM CHLORIDE 600; 310; 30; 20 MG/100ML; MG/100ML; MG/100ML; MG/100ML
INJECTION, SOLUTION INTRAVENOUS CONTINUOUS PRN
Status: DISCONTINUED | OUTPATIENT
Start: 2021-08-06 | End: 2021-08-06

## 2021-08-06 RX ORDER — ONDANSETRON 2 MG/ML
INJECTION INTRAMUSCULAR; INTRAVENOUS PRN
Status: DISCONTINUED | OUTPATIENT
Start: 2021-08-06 | End: 2021-08-06

## 2021-08-06 RX ORDER — POTASSIUM CHLORIDE 29.8 MG/ML
20 INJECTION INTRAVENOUS ONCE
Status: COMPLETED | OUTPATIENT
Start: 2021-08-06 | End: 2021-08-06

## 2021-08-06 RX ORDER — OXYCODONE HYDROCHLORIDE 5 MG/1
5 TABLET ORAL EVERY 4 HOURS PRN
Status: DISCONTINUED | OUTPATIENT
Start: 2021-08-06 | End: 2021-08-09

## 2021-08-06 RX ORDER — LIDOCAINE HYDROCHLORIDE 20 MG/ML
INJECTION, SOLUTION INFILTRATION; PERINEURAL PRN
Status: DISCONTINUED | OUTPATIENT
Start: 2021-08-06 | End: 2021-08-06

## 2021-08-06 RX ORDER — FENTANYL CITRATE 50 UG/ML
INJECTION, SOLUTION INTRAMUSCULAR; INTRAVENOUS PRN
Status: DISCONTINUED | OUTPATIENT
Start: 2021-08-06 | End: 2021-08-06

## 2021-08-06 RX ORDER — LABETALOL HYDROCHLORIDE 5 MG/ML
10 INJECTION, SOLUTION INTRAVENOUS
Status: DISCONTINUED | OUTPATIENT
Start: 2021-08-06 | End: 2021-08-06 | Stop reason: HOSPADM

## 2021-08-06 RX ORDER — FENTANYL CITRATE 50 UG/ML
25 INJECTION, SOLUTION INTRAMUSCULAR; INTRAVENOUS EVERY 5 MIN PRN
Status: DISCONTINUED | OUTPATIENT
Start: 2021-08-06 | End: 2021-08-06 | Stop reason: HOSPADM

## 2021-08-06 RX ORDER — SODIUM CHLORIDE, SODIUM LACTATE, POTASSIUM CHLORIDE, CALCIUM CHLORIDE 600; 310; 30; 20 MG/100ML; MG/100ML; MG/100ML; MG/100ML
INJECTION, SOLUTION INTRAVENOUS CONTINUOUS
Status: DISCONTINUED | OUTPATIENT
Start: 2021-08-06 | End: 2021-08-06 | Stop reason: HOSPADM

## 2021-08-06 RX ORDER — FLUMAZENIL 0.1 MG/ML
0.2 INJECTION, SOLUTION INTRAVENOUS
Status: ACTIVE | OUTPATIENT
Start: 2021-08-06 | End: 2021-08-07

## 2021-08-06 RX ORDER — ONDANSETRON 4 MG/1
4 TABLET, ORALLY DISINTEGRATING ORAL EVERY 30 MIN PRN
Status: DISCONTINUED | OUTPATIENT
Start: 2021-08-06 | End: 2021-08-06 | Stop reason: HOSPADM

## 2021-08-06 RX ORDER — ONDANSETRON 2 MG/ML
4 INJECTION INTRAMUSCULAR; INTRAVENOUS EVERY 30 MIN PRN
Status: DISCONTINUED | OUTPATIENT
Start: 2021-08-06 | End: 2021-08-06 | Stop reason: HOSPADM

## 2021-08-06 RX ADMIN — Medication 30 MCG/MIN: at 14:26

## 2021-08-06 RX ADMIN — MELATONIN TAB 3 MG 6 MG: 3 TAB at 22:17

## 2021-08-06 RX ADMIN — Medication 5 ML: at 08:53

## 2021-08-06 RX ADMIN — HYDROMORPHONE HYDROCHLORIDE 2 MG: 1 SOLUTION ORAL at 22:17

## 2021-08-06 RX ADMIN — PROPOFOL 100 MG: 10 INJECTION, EMULSION INTRAVENOUS at 13:30

## 2021-08-06 RX ADMIN — HYDROMORPHONE HYDROCHLORIDE 2 MG: 1 SOLUTION ORAL at 06:34

## 2021-08-06 RX ADMIN — PIPERACILLIN SODIUM AND TAZOBACTAM SODIUM 2.25 G: 2; .25 INJECTION, POWDER, LYOPHILIZED, FOR SOLUTION INTRAVENOUS at 06:34

## 2021-08-06 RX ADMIN — HYDROXYZINE HYDROCHLORIDE 50 MG: 25 TABLET, FILM COATED ORAL at 19:07

## 2021-08-06 RX ADMIN — HYDROMORPHONE HYDROCHLORIDE 1 MG: 1 SOLUTION ORAL at 11:02

## 2021-08-06 RX ADMIN — METRONIDAZOLE 500 MG: 500 INJECTION, SOLUTION INTRAVENOUS at 19:44

## 2021-08-06 RX ADMIN — SODIUM CHLORIDE, POTASSIUM CHLORIDE, SODIUM LACTATE AND CALCIUM CHLORIDE: 600; 310; 30; 20 INJECTION, SOLUTION INTRAVENOUS at 15:23

## 2021-08-06 RX ADMIN — THIAMINE HCL TAB 100 MG 100 MG: 100 TAB at 08:52

## 2021-08-06 RX ADMIN — PIPERACILLIN SODIUM AND TAZOBACTAM SODIUM 2.25 G: 2; .25 INJECTION, POWDER, LYOPHILIZED, FOR SOLUTION INTRAVENOUS at 00:43

## 2021-08-06 RX ADMIN — HYDROMORPHONE HYDROCHLORIDE 2 MG: 1 SOLUTION ORAL at 00:39

## 2021-08-06 RX ADMIN — HYDROMORPHONE HYDROCHLORIDE 1 MG: 1 SOLUTION ORAL at 04:20

## 2021-08-06 RX ADMIN — CALCIUM CARBONATE (ANTACID) CHEW TAB 500 MG 500 MG: 500 CHEW TAB at 01:15

## 2021-08-06 RX ADMIN — PHENYLEPHRINE HYDROCHLORIDE 200 MCG: 10 INJECTION INTRAVENOUS at 14:41

## 2021-08-06 RX ADMIN — CEFEPIME HYDROCHLORIDE 2 G: 2 INJECTION, POWDER, FOR SOLUTION INTRAVENOUS at 19:00

## 2021-08-06 RX ADMIN — ROCURONIUM BROMIDE 50 MG: 10 INJECTION INTRAVENOUS at 13:30

## 2021-08-06 RX ADMIN — HYDROXYZINE HYDROCHLORIDE 50 MG: 25 TABLET, FILM COATED ORAL at 01:15

## 2021-08-06 RX ADMIN — PHENYLEPHRINE HYDROCHLORIDE 100 MCG: 10 INJECTION INTRAVENOUS at 14:17

## 2021-08-06 RX ADMIN — ONDANSETRON 4 MG: 2 INJECTION INTRAMUSCULAR; INTRAVENOUS at 08:50

## 2021-08-06 RX ADMIN — FOLIC ACID 1 MG: 1 TABLET ORAL at 08:52

## 2021-08-06 RX ADMIN — PROPOFOL 50 MG: 10 INJECTION, EMULSION INTRAVENOUS at 13:32

## 2021-08-06 RX ADMIN — Medication 1 UNITS: at 15:03

## 2021-08-06 RX ADMIN — PHENYLEPHRINE HYDROCHLORIDE 100 MCG: 10 INJECTION INTRAVENOUS at 14:11

## 2021-08-06 RX ADMIN — PHENYLEPHRINE HYDROCHLORIDE 200 MCG: 10 INJECTION INTRAVENOUS at 14:23

## 2021-08-06 RX ADMIN — PIPERACILLIN SODIUM AND TAZOBACTAM SODIUM 2.25 G: 2; .25 INJECTION, POWDER, LYOPHILIZED, FOR SOLUTION INTRAVENOUS at 11:06

## 2021-08-06 RX ADMIN — PHENYLEPHRINE HYDROCHLORIDE 100 MCG: 10 INJECTION INTRAVENOUS at 14:22

## 2021-08-06 RX ADMIN — FENTANYL CITRATE 50 MCG: 50 INJECTION, SOLUTION INTRAMUSCULAR; INTRAVENOUS at 13:30

## 2021-08-06 RX ADMIN — HYDROMORPHONE HYDROCHLORIDE 1 MG: 1 SOLUTION ORAL at 17:31

## 2021-08-06 RX ADMIN — Medication 40 MG: at 09:19

## 2021-08-06 RX ADMIN — HYDROXYZINE HYDROCHLORIDE 50 MG: 25 TABLET, FILM COATED ORAL at 08:52

## 2021-08-06 RX ADMIN — ONDANSETRON 4 MG: 2 INJECTION INTRAMUSCULAR; INTRAVENOUS at 20:46

## 2021-08-06 RX ADMIN — PHENYLEPHRINE HYDROCHLORIDE 100 MCG: 10 INJECTION INTRAVENOUS at 14:36

## 2021-08-06 RX ADMIN — Medication 550 MG: at 08:52

## 2021-08-06 RX ADMIN — ONDANSETRON 4 MG: 2 INJECTION INTRAMUSCULAR; INTRAVENOUS at 15:08

## 2021-08-06 RX ADMIN — PHENYLEPHRINE HYDROCHLORIDE 200 MCG: 10 INJECTION INTRAVENOUS at 14:34

## 2021-08-06 RX ADMIN — POTASSIUM CHLORIDE 20 MEQ: 29.8 INJECTION, SOLUTION INTRAVENOUS at 12:35

## 2021-08-06 RX ADMIN — SUGAMMADEX 200 MG: 100 INJECTION, SOLUTION INTRAVENOUS at 15:44

## 2021-08-06 RX ADMIN — LEVOFLOXACIN 500 MG: 5 INJECTION, SOLUTION INTRAVENOUS at 13:35

## 2021-08-06 RX ADMIN — ONDANSETRON 4 MG: 4 TABLET, ORALLY DISINTEGRATING ORAL at 01:15

## 2021-08-06 RX ADMIN — Medication 1 UNITS: at 14:45

## 2021-08-06 RX ADMIN — PHENYLEPHRINE HYDROCHLORIDE 100 MCG: 10 INJECTION INTRAVENOUS at 13:41

## 2021-08-06 RX ADMIN — SODIUM CHLORIDE, POTASSIUM CHLORIDE, SODIUM LACTATE AND CALCIUM CHLORIDE: 600; 310; 30; 20 INJECTION, SOLUTION INTRAVENOUS at 13:38

## 2021-08-06 ASSESSMENT — ACTIVITIES OF DAILY LIVING (ADL)
ADLS_ACUITY_SCORE: 21
ADLS_ACUITY_SCORE: 21
ADLS_ACUITY_SCORE: 20
ADLS_ACUITY_SCORE: 21
ADLS_ACUITY_SCORE: 19

## 2021-08-06 ASSESSMENT — LIFESTYLE VARIABLES: TOBACCO_USE: 1

## 2021-08-06 ASSESSMENT — MIFFLIN-ST. JEOR: SCORE: 1597.5

## 2021-08-06 NOTE — ANESTHESIA CARE TRANSFER NOTE
Patient: Dax Villareal    Procedure(s):  ESOPHAGOGASTRODUODENOSCOPY (EGD) with necrosectomy, and stents exchanged    Diagnosis: Necrotizing pancreatitis [K85.91]  Diagnosis Additional Information: No value filed.    Anesthesia Type:   General     Note:    Oropharynx: oropharynx clear of all foreign objects and spontaneously breathing  Level of Consciousness: awake  Oxygen Supplementation: nasal cannula  Level of Supplemental Oxygen (L/min / FiO2): 3  Independent Airway: airway patency satisfactory and stable  Dentition: dentition unchanged    Report to RN Given: handoff report given  Patient transferred to: PACU  Comments: remains tachy and tachypnic in pacu.  Pt awake and interacting in pacu.    Handoff Report: Identifed the Patient, Identified the Reponsible Provider, Reviewed the pertinent medical history, Discussed the surgical course, Reviewed Intra-OP anesthesia mangement and issues during anesthesia, Set expectations for post-procedure period and Allowed opportunity for questions and acknowledgement of understanding      Vitals:  Vitals Value Taken Time   /50 08/06/21 1603   Temp     Pulse 112 08/06/21 1604   Resp 25 08/06/21 1604   SpO2 92 % 08/06/21 1604   Vitals shown include unvalidated device data.    Electronically Signed By: SOFIA Will CRNA  August 6, 2021  4:05 PM

## 2021-08-06 NOTE — ANESTHESIA PROCEDURE NOTES
Airway       Patient location during procedure: OR       Procedure Start/Stop Times: 8/6/2021 1:35 PM  Staff -        CRNA: Gerald De Souza APRN CRNA       Performed By: CRNA  Consent for Airway        Urgency: elective  Indications and Patient Condition       Indications for airway management: gera-procedural       Induction type:intravenous       Mask difficulty assessment: 1 - vent by mask    Final Airway Details       Final airway type: endotracheal airway       Successful airway: ETT - single  Endotracheal Airway Details        ETT size (mm): 7.0       Cuffed: yes       Successful intubation technique: direct laryngoscopy       DL Blade Type: MAC 4       Grade View of Cords: 2       Adjucts: stylet       Position: Right       Measured from: lips       Secured at (cm): 22    Post intubation assessment        Placement verified by: capnometry, equal breath sounds and chest rise        Number of attempts at approach: 1       Secured with: pink tape       Ease of procedure: easy       Dentition: Intact and Unchanged

## 2021-08-06 NOTE — PROGRESS NOTES
[facility]    Progress Note - [facility] 3 Service        Date of Admission:  6/28/2021    Assessment & Plan   Dax Villareal is a 30 year old male with hx of alcohol use disorder admitted on 6/28/2021 after recent prolonged admission at [facility] in [place] who has severe acute alcoholic hepatitis c/b HE, ESRD requiring HD, and malnutrition in the setting of acute necrotizing pancreatitis, s/p PEG-J placement on 7/13. He arrived with acute respiratory failure and septic shock that have resolved, and his secondary bacterial peritonitis has been appropriately treated. He requires continued management for ESRD, hepatic encephalopathy, infected pancreatic pseudocyst, nutritional support via PEG-J, and persistent right pleural effusion.     Today:  -Necrosectomy on 8/6, hold anticoagulation for 72 hours, plan for CT A/P next week for last session of endoscopic debridement.  - Abx switched from zosyn to cefepime 1 gm IV q 24 and flagyl IV q 8     # Necrotizing alcoholic pancreatitis w/ infected pseudocyst s/p RP Drain and Cystogastrostomy  # Septic Shock, resolved   # Leukocytosis    Admitted to the [facility] ICU on 6/28/2021 from [facility] in [place] for septic shock due to necrotizing pancreatitis. CT on admit showing mature collection with enhancing wall measuring ~[id number]. IR placed perc drain 6/29. Repeat imaging 7/18 revealed walled off necrotic collection amenable to endoscopic transluminal drainage with cystgastrostomy stenting with necrosectomy on 7/21/21.  7/28 - EGD, stent replacement (transluminal Axios with coaxial Solus), PEG-J removal (buried bumper) and replacement of new PEG-J with one new T-tag      - Continue Zosyn through endoscopic debridement on 8/6 per GI  - Repeat necrosectomy and Axios removal/replacement with Friday 8/6  - Daily CBC, watching fever curve  - GI following, appreciate recs              - Pancreatic enzymes   - Flush drain with 100ml  q4hr per GI   - Blood cultures and fluid cultures NGTD    # Secondary Bacterial Peritonitis 2/2 infected pseudocyst  # Peripancreatic fluid collection growing VRE   # Lactic acidosis  7/2 - Diagnostic paracentesis (WBC 2663 - 74% PMN, SAAG <1.1, protein 4.1)     Antibiotics:   Current: Cefepime 1 gm Q 24 + flagyl 500 q8 (8/6-present)     Past:  - Linezolid 7/21-7/30  - Vancomcyin 6/29  - Meropenem 6/29-7/18  - Micafungin 6/29-7/1  - Cipro 7/19-7/21  - Ceftriaxone 7/21-7/24; 7/26-7/27  - Zosyn 7/27-8/6    - Paracentesis PRN      # Right hepatic hydrothorax  # Acute hypoxemic respiratory failure  Intermittently increased subjective shortness of breath. CXR on 7/19 w/ increased RLL haziness; ddx includes pleural effusion v pneumonia v atelectasis. CT on 7/28 showed large volume R hepatic hydrothorax with complete atelectasis of right lung, bedside thora completed 7/28 with labs and cultures.     CXR 7/30 showed moderate R pleural effusion, unchanged from previous and improved aeration of right lung with small remaining pleural effusion on CT. Thora cultures negative.     - IR completed thoracentesis 7/28 - exudative  - ID following: agree to continue with empiric abx, though pleural effusion most likely 2/2 hepatic hydrothorax and pancreatitis. Defer plan for repeat thoracentesis based on symptoms and oxygen requirement. No plan for chest tube given concern for infected pleural effusion  - Supplemental oxygen as needed to keep sats above 89%  - Repeat Thoracentesis PRN for dyspnea     # Nausea with vomiting + high volume output from G tube  High G tube output due to the cystogastric connection/fluid drainage noted on 8/5. Per GI, possibly related to gastric outlet from severe abdominal inflammation and expected. KUB negative for obstructive gas pattern. Ileus from narcotics considered, but output not consistent with tube feeds  -NPO to help him tolerate his nutrition/help with vomiting  -Anti-emetics  -Will monitor and  continue to discuss with GI  -Having fluid removal per nephrology due to HD    #Hypokalemia  High GI output state. Replete as needed with ESRD noted    # Severe malnutrition in setting of chronic illness  PEG-J placed 7/13. Patient intermittently refusing tube feeds d/t nausea/vomiting.  - Dietitian consult for new tube feeds, appreciate recs   - Continuous TF per dietician order (advancing VERY slowly) with goal of 65 ml/hr              - Cont panc enzymes   - strict NPO for nausea/vomiting  - Regular Diet, thin liquids per SLP  - Continue multivitamins  - Pantoprazole 40 mg IV daily  - Zofran PRN     # Alcoholic hepatitis complicated by portal hypertension, ascites, and HE - improving   # Hx of Alcohol use disorder c/b withdrawal   # Anemia  # Coagulopathy   Patient with a history of marked alcohol use and alcoholic hepatitis in the past. Was given a course of steroids (1 week) during previous admission at OSH. Patient mental status has been stable, alert and oriented x4 after rifaxamin and aggressive lactulose. Not a liver transplant candidate.      - Lactulose 20g QID and PRN and rifaximin 550mg BID - as patient allows  - Trend MELD labs  - Daily folic acid and thiamine  - Delirium precautions  - Melatonin 6mg at bedtime  - Daily CBC   - Transfuse if hemoglobin < 7, platelet < 10 or < 30 with bleeding  - Continue to monitor for signs of bleeding      # Stage III RADHA on HD due to ATN  # ESRD due to ATN  # Hypervolemia causing acute respiratory failure, resolved  Nephrology note 7/21 indicates that patient has been on renal replacement therapy for two months and pt status has most likely transitioned from ARF to ESRD at this point.          > Hemodialysis as per renal reccs              - Midodrine 10 mg PRN before dialysis sessions     > Daily chemistry labs, strict I/O, and daily weights  - Aspiration precautions  - Supplemental oxygen as needed to keep sats above 89%    # Intermittent lactic acidosis  Suspect  due to poor lactate clearance due to ESRD and ESLD + ongoing inflammation/infection. No fluids given due to patient being on dialysis    # Apathy, concern for depression  Patient is intermittently refusing cares due to wanting a 'break,' but also reaffirms that he wants everything done to prolong his life. Mood is depressed, apathetic at times. Patient has multiple stressors including his own medical conditions and family health issues.  - Continue to offer emotional support services such as psychiatry, palliative,   - He declined psychiatry/psychology visits     # Goals of Care  Please see additional interval note for in depth details regarding conversation on 7/9 about goals of care. Patient's long term prognosis is still quite poor given his multi organ failure. Care conference on 7/15, pt did not wish to participate. Met with sistermargareth Cheney (on phone) and Melyssa and close friend Leroy with palliative, nephrology and general medicine teams to discuss Dax's current improving status, poor prognosis and wishes to continue full cares. *   - Hepatology confirmed pt will not be a transplant candidate  - Daily phone updates to sister Noni ([phone])       Diet: NPO for Medical/Clinical Reasons Except for: Meds, Ice Chips  Adult Formula Drip Feeding: Continuous Vital 1.5; Jejunostomy; Goal Rate: 60; mL/hr; Medication - Feeding Tube Flush Frequency: At least 15-30 mL water before and after medication administration and with tube clogging; 10 mL/hr    DVT Prophylaxis: Pneumatic Compression Devices  Rdz Catheter: Not present  Fluids: 10 mL NS TKO  Central Lines: PRESENT  PICC Triple Lumen 06/28/21 Left-Site Assessment: WDL  CVC Double Lumen Right-Site Assessment: WDL  Code Status: Full Code      Disposition Plan   Expected discharge: >7 days, recommended to transitional care unit once  nutrition improved, infected pancreatic pseudocyst is well managed and plan for dialysis is in place.       Johny Ortiz,  MD  [phone]    [facility]  Securely message with the [employer] Web Console (learn more here)  Text page via [employer] Paging/Directory  Please see sign in/sign out for up to date coverage information  _____________________________________________________________________    Interval History   Triggered Sepsis this AM. Lactate reassuring. Underwent Necresectomy today. Tolerated procedure well and sleeping afterwards.    4 pt ROS otherwise negative.     Data reviewed today: I reviewed all medications, new labs and imaging results over the last 24 hours.    Physical Exam   Vital Signs: Temp: 97.5  F (36.4  C) Temp src: Oral BP: 111/52 Pulse: 112   Resp: 22 SpO2: 96 % O2 Device: Nasal cannula with humidification Oxygen Delivery: 2 LPM  Weight: 154 lbs 5.15 oz  Constitutional: Asleep, NAD  HEENT: Normocephalic. Atraumatic. + Scleral icterus   Cardiovascular: tachycardic  Respiratory: 1LPM NC  Musculoskeletal: Emaciated  Skin: Jaundiced    Data   Recent Labs   Lab 08/06/21  1255 08/06/21  1002 08/06/21  0655 08/05/21  0536 08/04/21  0347 08/01/21  1414 07/31/21  0603   WBC  --   --  24.0* 27.5* 22.8* 14.0* 15.2*   HGB  --   --  8.1* 8.9* 8.7* 8.1* 8.2*   MCV  --   --  91 91 92 93 96   PLT  --   --  316 311 194 98* 124*   INR  --  1.68*  --   --   --  1.69* 1.73*   NA  --   --  124* 129* 132* 134 133   POTASSIUM  --   --  3.4 3.3* 3.2* 3.0* 4.1   CHLORIDE  --   --  90* 92* 97 97 100   CO2  --   --  24 24 25 28 21   BUN  --   --  14 28 14 22 34*   CR  --   --  2.36* 3.89* 2.53* 2.68* 3.35*   ANIONGAP  --   --  10 13 10 9 12   JANENE  --   --  8.3* 9.4 8.6 8.2* 8.9   GLC 85  --  86 169* 143* 131* 68*   ALBUMIN  --   --  2.0* 2.3* 2.3* 2.0* 1.9*   PROTTOTAL  --   --  7.8 8.9* 8.0 7.3 6.6*   BILITOTAL  --   --  3.4* 3.9* 4.1* 4.2* 4.6*   ALKPHOS  --   --  132 147 136 122 111   ALT  --   --  19 18 16 15 14   AST  --   --  43 41 34 28 38     Recent Results (from the past 24 hour(s))   XR  Surgery CESIA Fluoro L/T 5 Min w Stills    Narrative    This exam was marked as non-reportable because it will not be read by a   radiologist or a [facility] non-radiologist provider.

## 2021-08-06 NOTE — PLAN OF CARE
"A/O X4; repeated requests. Able to make needs known, uses call light appropriately. Refusing some cares and doses of lactulose. Educated on importance. HR 's, VSS; afebrile; Lung sounds dim on 1-2L of O2 per NC with sats maintained above 95%, per patient preference. Pt anuric on HD. Bm x1. Cdiff sample collected. NPO w/ ice chips, TF @ 60 ml/hr through j tube. G tube to gravity. Abd drain flushed per mar. Dialyzed this afternoon. Chest/abd CT completed. C/o abd/back pain scheduled dilaudid and prn dilaudid given. Atarax given PRN. Plan for necrosectomy tomorrow. Covid swab collected, pt refused all day then called staff \"debi\". Will cont to follow poc.  "

## 2021-08-06 NOTE — ANESTHESIA PREPROCEDURE EVALUATION
Anesthesia Pre-Procedure Evaluation    Patient: Dax Villareal   MRN: [id number] : 1990        Preoperative Diagnosis: Necrotizing pancreatitis [K85.91]   Procedure : Procedure(s):  ESOPHAGOGASTRODUODENOSCOPY (EGD) with necrosectomy     Past Medical History:   Diagnosis Date     2008      Past Surgical History:   Procedure Laterality Date     ENDOSCOPIC INSERTION TUBE GASTROSTOMY N/A 2021    Procedure: Upper endoscopy, INSERTION, PEG-J TUBE and Gastropexy;  Surgeon: Rayshawn Will MD;  Location: [facility] OR     ENDOSCOPIC ULTRASOUND UPPER GASTROINTESTINAL TRACT (GI) N/A 2021    Procedure: ENDOSCOPIC ULTRASOUND, ESOPHAGOSCOPY / UPPER GASTROINTESTINAL TRACT (GI) with cyst gastrostomy, dilation;  Surgeon: Guru Yecenia Chacko MD;  Location: [facility] OR     ESOPHAGOSCOPY, GASTROSCOPY, DUODENOSCOPY (EGD), COMBINED N/A 2021    Procedure: ESOPHAGOGASTRODUODENOSCOPY (EGD), gastrostomy apposition, Necrosectomy, stent exchange.;  Surgeon: Rayshawn Will MD;  Location: [facility] OR     IR ABSCESS TUBE CHANGE  2021     ORTHOPEDIC SURGERY Right     fracture repair     REPLACE GASTROSTOMY TUBE, PERCUTANEOUS N/A 2021    Procedure: gastro-jejunostomy tube exchange;  Surgeon: Rayshawn Will MD;  Location: [facility] OR      Allergies   Allergen Reactions     Tylenol [Acetaminophen] Itching      Social History     Tobacco Use     Smoking status: Current Every Day Smoker     Smokeless tobacco: Never Used   Substance Use Topics     Alcohol use: Yes      Wt Readings from Last 1 Encounters:   21 70 kg (154 lb 5.2 oz)        Anesthesia Evaluation   Pt has had prior anesthetic.         ROS/MED HX  ENT/Pulmonary:  - neg pulmonary ROS   (+) tobacco use, Current use,     Neurologic:  - neg neurologic ROS     Cardiovascular:       METS/Exercise Tolerance:     Hematologic:     (+) anemia,     Musculoskeletal:       GI/Hepatic: Comment: Alcoholic pancreatitis s/p necrosectomy now for repeat  procedure    (+) liver disease,     Renal/Genitourinary:     (+) renal disease, type: ARF,     Endo:       Psychiatric/Substance Use: Comment: ADHD    (+) psychiatric history other (comment) alcohol abuse     Infectious Disease:       Malignancy:       Other:            Physical Exam    Airway        Mallampati: III   TM distance: > 3 FB   Neck ROM: limited   Mouth opening: > 3 cm    Respiratory Devices and Support         Dental       (+) chipped      Cardiovascular          Rhythm and rate: regular and normal     Pulmonary           breath sounds clear to auscultation           OUTSIDE LABS:  CBC:   Lab Results   Component Value Date    WBC 27.5 (H) 08/05/2021    WBC 22.8 (H) 08/04/2021    HGB 8.9 (L) 08/05/2021    HGB 8.7 (L) 08/04/2021    HCT 26.9 (L) 08/05/2021    HCT 25.9 (L) 08/04/2021     08/05/2021     08/04/2021     BMP:   Lab Results   Component Value Date     (L) 08/05/2021     (L) 08/04/2021    POTASSIUM 3.3 (L) 08/05/2021    POTASSIUM 3.2 (L) 08/04/2021    CHLORIDE 92 (L) 08/05/2021    CHLORIDE 97 08/04/2021    CO2 24 08/05/2021    CO2 25 08/04/2021    BUN 28 08/05/2021    BUN 14 08/04/2021    CR 3.89 (H) 08/05/2021    CR 2.53 (H) 08/04/2021     (H) 08/05/2021     (H) 08/04/2021     COAGS:   Lab Results   Component Value Date    PTT 97 (H) 06/28/2021    INR 1.69 (H) 08/01/2021    FIBR 225 06/28/2021     POC:   Lab Results   Component Value Date     (H) 07/05/2021     HEPATIC:   Lab Results   Component Value Date    ALBUMIN 2.3 (L) 08/05/2021    PROTTOTAL 8.9 (H) 08/05/2021    ALT 18 08/05/2021    AST 41 08/05/2021    ALKPHOS 147 08/05/2021    BILITOTAL 3.9 (H) 08/05/2021    ADALBERTO 28 06/28/2021     OTHER:   Lab Results   Component Value Date    LACT 2.2 (H) 08/05/2021    JANENE 9.4 08/05/2021    PHOS 4.1 08/05/2021    MAG 2.3 08/05/2021    LIPASE 199 06/28/2021    CRP 76.0 (H) 08/05/2021       Anesthesia Plan    ASA Status:  3   NPO Status:  NPO Appropriate     Anesthesia Type: General.     - Airway: ETT   Induction: Intravenous, Propofol.   Maintenance: Balanced.        Consents    Anesthesia Plan(s) and associated risks, benefits, and realistic alternatives discussed. Questions answered and patient/representative(s) expressed understanding.     - Discussed with:  Patient         Postoperative Care    Pain management: IV analgesics.   PONV prophylaxis: Ondansetron (or other 5HT-3)     Comments:                Milo Fish MD     I have seen and evaluated the patient myself and agree with the above assessment and plan.  I have explained the risks/benefits of anesthesia to the patient who understands and agrees to proceed.    Kathy Mcdonough MD  Staff Anesthesiologist  Pager [phone]

## 2021-08-06 NOTE — PROGRESS NOTES
"/64 (BP Location: Right arm)   Pulse (!) 122   Temp 98.1  F (36.7  C) (Oral)   Resp 16   Ht 1.676 m (5' 5.98\")   Wt 70 kg (154 lb 5.2 oz)   SpO2 97%   BMI 24.92 kg/m      Neuro: A&Ox4. Frequently calls.  Cardiac: Afebrile, VSS.ST   Respiratory: 3L per pt request.   GI/: on HD. No BM this shift.   Diet/appetite: NPO. Some nausea -Zofran given  Activity:up with a lift.    Pain: PO dilaudid, PRN.  Skin: No new deficits noted.  Lines: PICC  Drains:PEG-J tube with G-tube to gravity and J-tube clamped. L flank abdominal drain.    Currently in the OR.No new complaints.Will continue to monitor and follow plan of care.       "

## 2021-08-06 NOTE — PROGRESS NOTES
[facility] PROGRESS NOTE     Patient:  Dax Villareal   YOB: 1990, MRN: [id number]  Date of Visit: 08/06/2021  Date of Admission: 6/28/2021  Consult Requester: Kun Taylor,*          Assessment and Recommendations:   ID Problem List:  1. Pleural effusion   - Cx - neg for aerobic/ anaerobic 7/28/21  2. Lactic acidosis  3. Acute necrotizing pancreatitis s/p cystgastrostomy stenting with necrosectomy 7/21   - abdominal abscess VRE  7/14/21  4. Leukocytosis  5. RADHA requiring iHD  6. Decompensated ESLD c/b HE  7. Alcoholic hepatitis  8. Ascites    - peritoneal fluid - cx neg on 7/30/21  8. Sputum Enterococcus faecium ( VSE) and C.albicans       Recommendations:   1. Stop Zosyn  2. Start cefepime 1g IV q24hrs (renally adjusted for HD)  3. Start Flagyl 500mg PO or IV q8hrs  4. Awaiting necrosectomy procedure, planned for today  - Please send cultures from procedure if able      Discussion:  Dax Villareal is a 30 year old male with PMHx significant for alcohol use disorder who was admitted to [facility] in [place], [place] for abdominal pain, nausea and vomiting, found to have end stage liver disease c/b hepatic encephalopathy, acute renal failure requiring iHD, SBP and acute necrotizing pancreatitis. ID last signed off 7/23 with recs to transition from broad spectrum antibiotics back to po cipro on 7/26 (see previous note for additional detail).     ID was called back because patient has since undergone R thoracentesis on 7/28 with findings consistent with exudative effusion with an LDH and glucose that were overall were not consistent with empyema and the gross fluid appearance was not noted to appear as pus either, though a pH was not sent. He has had intermittently elevated lactate and so primary team has patient on empiric zosyn while awaiting necrosectomy procedure. He otherwise remains afebrile, hemodynamically stable. Leukocytosis had been improving and likely  related to ongoing necrotizing pancreatitis; increased from 22.8-->27.5 on 8/5. On chart review, having significant number of liquid stools despite minimal doses of lactulose and relatively stable tube feeds. C.diff negative 8/5. Zosyn may cause diarrhea in some patients; recommend transition to cefepime + Flagyl in an effort to reduce diarrhea. Repeat CXR 7/28 (after thora) showing continued moderate sized R pleural effusion; large effusion on CXR 8/2. CT AP notes improved aeration of right lung with mixed GGOs in the right middle lung that could represent sequela of reexpansion, although infection not excluded. If pneumonia is a concern would recheck sputum sample. Previously grown VSE faecium has not been adequately treated with >1 week of linezolid. Suspect the exudative effusion is due to combination of hepatic hydrothorax and pancreatitis, cultures have been negative thus far with most recent cx on 7/28.    Millie Talamantes PA-C  Pronouns: she/her/hers  Infectious Diseases  Pager # [phone]         Interval History and Events:     Feels same as yesterday. Pain stable. No nausea, vomiting. Diarrhea may be a little less but still liquid stools this AM. No new concerns. Breathing feels good, on 2L for comfort.         Antimicrobial Treatment:   Current:  - Zosyn 7/27-present     Past:  - Linezolid 7/21-7/30  - Vancomcyin 6/29  - Meropenem 6/29-7/18  - Micafungin 6/29-7/1  - Cipro 7/19-7/21  - Ceftriaxone 7/21-7/24; 7/26-7/27         Review of Systems:   Targeted 4 point ROS was completed with pertinent positives and negatives are detailed above.         HPI:   Adopted from initial consult note:    Dax Villareal is a 30 year old male with PMHx significant for alcohol use disorder who was admitted to [facility] in [place], [place] for abdominal pain, nausea and vomiting, found to have end stage liver disease c/b hepatic encephalopathy, acute renal failure requiring iHD, SBP and acute necrotizing pancreatitis.      He  "required respiratory support (BIPAP, ? Intubation) for respiratory failure and appeared to be in sepsis (lactate reported at 7.7 with abnormal LFTs). He was transferred to [facility] for prolonged admission and reportedly treated with steroids; kidney function and pancreatic enzymes normal on admission). Length of stay stated >40 days. CT imaging 6/19 with concern for necrotizing pancreatitis with follow up CT scan 6/25 with peripancreatic fluid collection and 9 cm soft tissue/hemorrhagic structure c/f internal hemorrhage of a pseudocyst. Given multiple antibiotics (Cefepime, Flagyl, Ciprofloxacin, micafungin). Hospital course reportedly complicated by hypoxemic respiratory failure requiring BIPAP, ARF requiring iHD, hepatic encephalopathy, and septic shock 2/2 necrotizing pancreatitis/pancreatic pseudocyst requiring pressor support. Ultimately transferred to [facility] for interventions 6/28. Admitted to the ICU.      Now status post perc IR drain placement 6/29 with cultures that remain NGTD/negative. No endoscopic drainage by GI was pursued given improvement in imaging 7/11. Additionally has had 3 paracenteses (7/2, 7/5, and 7/13 with negative culture work up. Had PEGJ placed 7/13.      He is an outdoorsman. Picks mushrooms, goes fishing/hunting, and hiking. Has not been since \"last spring\". Denies feeling ill prior to admission to OSH. Feeling fairly well currently although notes diffuse abdominal pain. Has had multiple procedures within the past few days and states he feels like he needs a \"rest day\". Denies fever/chills. No URI sx. Is having diarrhea, but on lactulose. Anuric.           Physical Examination:   Temp:  [97.8  F (36.6  C)-98.3  F (36.8  C)] 98.2  F (36.8  C)  Pulse:  [120-131] 122  Resp:  [12-22] 16  BP: ()/(42-76) 98/42  SpO2:  [93 %-100 %] 93 %    I/O last 3 completed shifts:  In: 2120 [P.O.:320; I.V.:10; Other:500; NG/GT:330]  Out: 2400 [Emesis/NG output:700; Drains:200; " Other:1500]    Vitals:    08/03/21 0511 08/03/21 1637 08/04/21 0636 08/05/21 0142   Weight: 71.5 kg (157 lb 10.1 oz) 71.5 kg (157 lb 10.1 oz) 70 kg (154 lb 5.2 oz) 67.5 kg (148 lb 13 oz)    08/06/21 0620   Weight: 70 kg (154 lb 5.2 oz)       Constitutional:Young adult male seen lying supine in bed,in NAD.  HEENT: NC/AT, EOMI, scleral icterus, conjunctiva normal, NC in place, OP with MMM  Respiratory: No increased work of breathing on 2L this AM.  GI: BS + soft, G tube and left lateral drain in place  Skin: Warm, dry, well-perfused. No bruising, bleeding, rashes, or lesions on limited exam.  Neurologic: A&O. Answers questions appropriately, speech normal.   Neuropsychiatric: Calm, flat. Affect appropriate to situation.  Vascular access: PICC LUE, dialysis CVC R chest both sites non-erythematous.         Medications:       sodium bicarbonate  325 mg Per Feeding Tube Q4H    And     amylase-lipase-protease  1-2 capsule Per Feeding Tube Q4H     amylase-lipase-protease  2 capsule Oral TID w/meals     B and C vitamin Complex with folic acid  5 mL Per Feeding Tube Daily     folic acid  1 mg Oral Daily    Or     folic acid  1 mg Intravenous Daily     HYDROmorphone (STANDARD CONC)  2 mg Oral Q8H     lactulose  20 g Oral or NG Tube 4x Daily     melatonin  6 mg Oral At Bedtime     pantoprazole  40 mg Oral QAM AC     piperacillin-tazobactam  2.25 g Intravenous Q6H     rifaximin  550 mg Oral BID     sodium chloride (PF)  100 mL Irrigation Q4H LENKA     sodium chloride (PF)  3 mL Intracatheter Q8H     sodium chloride (PF)  3 mL Intracatheter Q8H     sodium chloride (PF)  3 mL Intracatheter Q8H     sodium chloride (PF)  73 mL Intravenous Once     vitamin B1  100 mg Oral Daily    Or     thiamine  100 mg Intravenous Daily       Antiinfectives:  Anti-infectives (From now, onward)    Start     Dose/Rate Route Frequency Ordered Stop    07/27/21 2230  piperacillin-tazobactam (ZOSYN) 2.25 g vial to attach to  ml bag     Note to  "Pharmacy: For [facility], [facility] and [facility]: For Zosyn-naive patients, use the \"Zosyn initial dose + extended infusion\" order panel.    2.25 g  over 30 Minutes Intravenous EVERY 6 HOURS 07/27/21 2222 07/05/21 0830  rifaximin (XIFAXAN) oral suspension 550 mg      550 mg Oral 2 TIMES DAILY 07/05/21 0757            Infusions/Drips:    - MEDICATION INSTRUCTIONS -       dextrose              Laboratory Data:     Microbiology:  Culture   Date Value Ref Range Status   07/30/2021 No Growth  Final   07/30/2021 No anaerobic organisms isolated after 6 days  Preliminary   07/30/2021 No growth after 6 days  Preliminary   07/30/2021 No Growth  Final   07/30/2021 No Growth  Final   07/28/2021 No Growth  Final   07/28/2021 No growth after 8 days  Preliminary   07/28/2021 No anaerobic organisms isolated  Final   07/27/2021 No Growth  Final   07/27/2021 No Growth  Final   07/26/2021 No Growth  Final   07/26/2021 No Growth  Final   07/21/2021 No Growth  Final   07/19/2021 2+ Candida albicans (A)  Final     Comment:     Susceptibilities not routinely done   07/19/2021 2+ Enterococcus faecium (A)  Final   07/16/2021 4+ Candida albicans (A)  Final     Comment:     Susceptibilities not routinely done   07/16/2021 1+ Normal fausto  Final   07/16/2021 No Growth  Final   07/16/2021 No Growth  Final   07/16/2021 No Growth  Final   07/14/2021 Culture in progress  Final   07/14/2021 4+ Enterococcus faecium VRE (A)  Final     Comment:     Preliminary result, confirmation pending.   07/14/2021 4+ Enterococcus faecium VRE (A)  Final     Comment:     Strain 2   07/14/2021 No anaerobic organisms isolated  Final   07/13/2021 No Growth  Final       Inflammatory Markers    Recent Labs   Lab Test 08/06/21  0655 08/05/21  0536 08/04/21  0347 08/03/21  0620 08/02/21  0603 10/06/20  0927   CRP 57.0* 76.0* 89.0* 88.0* 93.0* 16.9*       Metabolic Studies       Recent Labs   Lab Test 08/06/21  0811 08/06/21  0655 08/05/21  0536 08/04/21  0347 08/03/21  0620 " 08/02/21  0603 08/01/21  1414   NA  --  124* 129* 132* 133 131* 134   POTASSIUM  --  3.4 3.3* 3.2* 3.4 3.3* 3.0*   CHLORIDE  --  90* 92* 97 94 96 97   CO2  --  24 24 25 24 26 28   ANIONGAP  --  10 13 10 15* 9 9   BUN  --  14 28 14 33* 26 22   CR  --  2.36* 3.89* 2.53* 4.29* 3.21* 2.68*   GFRESTIMATED  --  35* 19* 33* 17* 24* 30*   GLC  --  86 169* 143* 135* 134* 131*   JANENE  --  8.3* 9.4 8.6 8.6 8.3* 8.2*   PHOS  --  3.9 4.1 2.9 3.1 2.7 2.8   MAG  --  1.8 2.3 2.0 2.4* 2.4* 1.7   LACT 1.9  --  2.2*  --   --  2.1*  --        Hepatic Studies    Recent Labs   Lab Test 08/06/21  0655 08/05/21  0536 08/04/21  0347 08/03/21  0620 08/02/21  0603 08/01/21  1414 07/28/21  1038   BILITOTAL 3.4* 3.9* 4.1* 3.7* 3.7* 4.2*  --    ALKPHOS 132 147 136 124 118 122  --    ALBUMIN 2.0* 2.3* 2.3* 1.8* 1.8* 2.0*  --    AST 43 41 34 29 26 28  --    ALT 19 18 16 14 14 15  --    LDH  --   --   --   --   --   --  106       Pancreatitis testing    Recent Labs   Lab Test 06/29/21  0410 06/28/21  1839 05/18/21  1220 12/09/20  1504 10/06/20  0927   LIPASE  --  199 381 140 139   TRIG Unsatisfactory specimen - icteric Canceled, Test credited  --   --   --        Hematology Studies      Recent Labs   Lab Test 08/06/21  0655 08/05/21  0536 08/04/21  0347 08/03/21  0620 08/02/21  1720 08/02/21  0603 06/29/21  0410 06/28/21  1839 05/18/21  1220 12/09/20  1504 10/06/20  0927   WBC 24.0* 27.5* 22.8* 18.3* 15.6* 15.7*  15.7*   < > 50.6* 13.2* 4.2 6.9   ANEU  --   --   --   --  12.0* 11.9*  --  44.0* 12.0* 2.1 5.2   ALYM  --   --   --   --  0.8 1.3  --  0.0* 0.6* 1.3 0.8   MEDHAT  --   --   --   --  2.2* 2.2*  --  2.0* 0.4 0.6 0.8   AEOS  --   --   --   --  0.3 0.0  --  0.5 0.0 0.2 0.1   HGB 8.1* 8.9* 8.7* 8.2* 8.1* 8.1*  8.1*   < > 7.6* 16.6 17.2 18.8*   HCT 24.1* 26.9* 25.9* 24.6* 25.0* 24.8*  24.8*   < > 23.4* 44.8 47.7 51.2    311 194 138* 125* 110*  110*   < > 127* 222 299 311    < > = values in this interval not displayed.       Arterial  Blood Gas Testing    Recent Labs   Lab Test 07/01/21  0014 06/30/21  1716 06/28/21  1839   O2PER 60.0 5L 2L        Body Fluid Studies  Recent Labs   Lab Test 07/30/21  1510 07/28/21  1216 07/21/21  0842 07/16/21  1154 07/13/21  1037 07/05/21  1605 07/02/21  1250 07/02/21  1250   FTYP  --   --   --   --   --  Ascites  --  Ascites   FCOL Yellow* [place]* Yellow*  --   --  Orange   < > Pink   FAPR Turbid* Cloudy* Cloudy*   < >  --  Cloudy   < > Cloudy   FRBC  --  26  --   --   --   --   --   --    FWBC 364 457 453  --   --  1801   < > 2663   FNEU 1 18 71   < >  --  61   < > 74   FLYM 10 48 17   < >  --  32   < > 19   FMONO 2  --  12   < >  --  6   < > 7   FBAS  --   --   --   --   --  1  --   --    FALB 1.1  --  1.8   < >   < > 2.6  --  2.0   FTP 3.6 4.7  --   --   --  5.2   < > 4.1   GS  --   --   --   --   --  No organisms seen  Moderate  WBC'S seen    --  No organisms seen  Moderate  WBC'S seen  predominantly PMN's      < > = values in this interval not displayed.       Drug Level Monitoring  Recent Labs   Lab Test 06/30/21  0700   VANCOMYCIN 31.2*       Hepatitis B Testing   Recent Labs   Lab Test 07/29/21  0537 06/30/21  1245   HBCAB Nonreactive  --    HEPBANG Nonreactive Nonreactive       Last check of C difficile  C Difficile Toxin B by PCR   Date Value Ref Range Status   08/05/2021 Negative Negative Final     Comment:     A negative result does not exclude actual disease due to C. difficile and may be due to improper collection, handling and storage of the specimen or the number of organisms in the specimen is below the detection limit of the assay.       COVID-19 Testing  Recent Labs   Lab Test 07/06/21  2030 06/29/21  0559 05/18/21  1203   [id number] Nasopharyngeal Nasopharyngeal Nasopharyngeal   SARSCOVRES NEGATIVE NEGATIVE NEGATIVE              Imaging:       Recent Results (from the past 48 hour(s))   XR Abdomen Port 1 View    Narrative    Exam: XR ABDOMEN PORT 1 VIEWS, 8/4/2021 2:55 PM    Indication:  concern for ileus    Comparison: CT: 7/30/2021, chest x-ray: 8/2/2021    Findings:   Portable radiograph of the abdomen. Cystgastrostomy stent, unchanged.  Left lateral approach drain, unchanged. Unchanged gastrojejunostomy  tube. Nonobstructive bowel gas pattern. No pneumatosis or portal  venous gas. Kidneys appear enlarged and edematous, better  characterized on recent CT. No aggressive osseous lesions. Right  pleural effusion.       Impression    Impression:  1. Nonobstructive bowel gas pattern.  2. Enlarged and edematous appearance of the kidneys, better  characterized on recent CT.    I have personally reviewed the examination and initial interpretation  and I agree with the findings.    VIKTOR BRUCE MD         SYSTEM ID:  [id number]   CT Abdomen Pelvis w Contrast    Narrative    EXAMINATION: CT ABDOMEN PELVIS W CONTRAST, 8/5/2021 4:32 PM    TECHNIQUE:  Helical CT images from the lung bases through the  symphysis pubis were obtained with IV contrast. Contrast dose:  iopamidol (ISOVUE-370) solution 91 mL    COMPARISON: 7/30/2021, 7/27/2021    HISTORY: Abdominal abscess/infection suspected    FINDINGS:    LUNG BASES: Normal heart size, no pericardial effusion. Large right  pleural effusion unchanged with associated compressive atelectasis. No  pneumothorax. Previous right middle lobe consolidative opacities from  7/30/2021 have resolved.    ABDOMEN/PELVIS: Cystogastrostomy in place. Large bore drain within the  peripancreatic walled off necrosis. Percutaneous jejunostomy  terminating in the mid jejunum. Small volumes of ascites. Unremarkable  hepatic parenchyma with punctate cysts unchanged. Gallbladder is  unremarkable. Essentially stable size and appearance of the walled off  peripancreatic fluid collections, largest involving the pancreatic  body and tail which measures 11 x 4.2 cm. Additional multiloculated  fluid collection adjacent to the pancreatic head measures  approximately 4.3 x 0.5 cm, also  stable. Stable 3.1 x 4.0 cm  perilesional fluid collection (series 5, image 243). Slight decrease  in size of rim hyperdense right paracolic gutter fluid collection.  Small amount of residual viable pancreatic tissue involving the  uncinate process, head, and body. Edematous renal parenchyma for  contrast enhancement. Fluid-filled small bowel. Mild mural enhancement  of the colon, which is decompressed. Appendix is not visualized. Major  abdominal vasculature is patent. Multiple reactive appearing  mesenteric lymph nodes. No concerning pelvic mass.    BONES: No acute osseous or abdominal abnormality.      Impression    IMPRESSION:  1. Stable sequelae of necrotizing pancreatitis with cystogastrostomy  stent, percutaneous drain, and multiple necrotic peripancreatic fluid  collections as described above.  2. Stable small volume of abdominopelvic ascites. Rim hyperdense right  paracolic gutter fluid appears slightly decreased.  3. Large right pleural effusion with associated compressive  atelectasis.  4. Continued poor renal parenchymal enhancement, suggesting acute  tubular necrosis..    I have personally reviewed the examination and initial interpretation  and I agree with the findings.    CHELLE STARKS MD         SYSTEM ID:  [id number]

## 2021-08-06 NOTE — BRIEF OP NOTE
[facility] Brief Operative Note    Pre-operative diagnosis: Necrotizing pancreatitis [K85.91]   Post-operative diagnosis As prior    Procedure: Procedure(s):  ESOPHAGOGASTRODUODENOSCOPY (EGD) with necrosectomy, and stents exchanged   Surgeon(s): Surgeon(s) and Role:     * Jose Quigley MD - Primary     * Palmira Pozo MD   Estimated blood loss: 2 mL    Specimens: * No specimens in log *   Findings: - Successful apparent near complete debridement of the walled-off necrosis cavity including pocket around the proximal part of the percutaneous drain.   - Replacement of previous cystgastrostomy stents with 2 plastic double pigtail stents.  - There was intermittent minimal oozing from the granulation tissue during debridement but no clinically significant bleeding.    Plan    - Standard recovery from general anesthesia, then return patient to hospital ly for ongoing care.   - OK to resume prior diet when awake if no new complaints.  - Aggressive irrigation (100cc every 4h) through the left flank drain now that the drain is in continuity with the transgastric stents   - Hold anticoagulation for 72 hrs given the oozing (self-limited) during this exam. OK to resume in 72 hrs if no bleeding.   - - Anticipate repeat CT A/P next week prior to expected last session of endoscopic debridement.   - Further follow-up per inpt consult service.   - The findings and recommendations were discussed with the patient and their family.

## 2021-08-06 NOTE — PLAN OF CARE
"/61 (BP Location: Right arm)   Pulse (!) 121   Temp 98.3  F (36.8  C) (Oral)   Resp 18   Ht 1.676 m (5' 5.98\")   Wt 67.5 kg (148 lb 13 oz)   SpO2 95%   BMI 24.03 kg/m      Hours of Care: [id number].    Reason for admission: Admitted on 6/28 from OSH for acute respiratory failure and septic shock with severe acute alcoholic hepatitis c/b HE, ESRD requiring HD, and malnutrition with acute necrotizing pancreatitis.  Hx of EtoH use disorder and recent prolonged hospitalization at [facility].  Vitals: VSS.   Activity: Up with A2 and lift.  Pain: Abdominal pain controlled with PRN and scheduled Dilaudid.  Neuro: AO x 4, labile at times and calls frequently.  Cardiac: ST.    Respiratory: WDL on RA.  GI/: Anuric, on hemodialysis.  BM overnight.  Diet: NPO except meds.    Lines: PEG-J tube with G-tube to gravity and J-tube clamped.  L TL PICC, L flank abdominal drain.  Skin/Wounds: Jaundiced, blanchable reddness of coccyx.   Plan: Necrosectomy today at 12:30.      Continue to monitor and follow POC    Hebert Miller RN on 8/6/2021 at 6:16 AM           "

## 2021-08-06 NOTE — PROGRESS NOTES
Nephrology Progress Note  08/06/2021         Dax Villareal is a 30 year old male with h/o ETOH hepatitis, end stage liver disease, RADHA on HD, transferred from [facility] in [place] for septic shock on 6/28/21 for treatment of necrotizing pancreatitis and decompensated liver disease. He was initially admitted to [facility] 5/19/21.        Interval History :   Mr Villareal had HD yesterday, in OR for EGD with necrosectomy, plan for HD tomorrow.  Na low despite run yesterday but will be NPO today.  Still with ongoing abd pain, starting on EPO with runs.          Assessment & Recommendations:   RADHA-Cr was 0.8 as recently as 5/19/2021 but now HD dependent, started RRT at [facility] prior to transfer to [facility] (exact date unclear but per family it was end of May).  Etiology likely multifactorial with contrast, sepsis/pancreatitis, likely HRS physiology contributing as well.  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  Continuing RRT as long as it is tolerated with regards to BP's, is functional enough to possibly do outpt HD.  MELD is in high 30's so has high 3 month mortality but continuing restorative cares for now.  Had GI stenting of pancreas on 7/21 and 7/28.                 -Line is TDC from PTA               -Holding on run today, HD ordered for tomorrow with 3L of UF as BP's allow.       Volume status-Anuric, had HD 1.5L of UF yesterday, plan for HD tomorrow and will try to pull 3L.       Electrolytes/pH-K 3.4 and Na 124, on fluid restriction for hyponatremia.  Had been improving Na, will run HD tomorrow.       Ca/phos/pth-Ca 8.3, corrects to normal with low albumin.  Mg and Phos WNL.       Anemia-Hgb 8.7, acute management per team, last PRBC's 7/14.  Starting on EPO 5k with runs, iron sats 43%      Nutrition-Taking PO, appetite up and down.       Discussed with Dr Kelley      Recommendations were communicated to primary team via verbal communication.        SOFIA Marlow CNS  Clinical Nurse  "Specialist  [phone]    Review of Systems:   I reviewed the following systems:  Gen: No fevers or chills  CV: No CP at rest  Resp: No SOB at rest  GI: + abd pain    Physical Exam:   I/O last 3 completed shifts:  In: 1200 [P.O.:100; I.V.:10; Other:400; NG/GT:210]  Out: 450 [Emesis/NG output:250; Drains:200]   /64 (BP Location: Right arm)   Pulse (!) 122   Temp 98.1  F (36.7  C) (Oral)   Resp 16   Ht 1.676 m (5' 5.98\")   Wt 70 kg (154 lb 5.2 oz)   SpO2 97%   BMI 24.92 kg/m       GENERAL APPEARANCE: Mild distress, + abd pain.    EYES:  scleral icterus, pupils equal  Pulmonary: lungs clear to auscultation. Off supplemental oxygen   CV: tachy   - Edema - no LE edema  GI: large distended, tender. Has retroperitoneal drain  MS: no evidence of inflammation in joints, no muscle tenderness  SKIN: no rash, warm, dry, no cyanosis  NEURO: alert/interactive  ACCESS: Right TDC    Labs:   All labs reviewed by me  Electrolytes/Renal - Recent Labs   Lab Test 08/06/21  1255 08/06/21  0655 08/05/21 0536 08/04/21 0347   NA  --  124* 129* 132*   POTASSIUM  --  3.4 3.3* 3.2*   CHLORIDE  --  90* 92* 97   CO2  --  24 24 25   BUN  --  14 28 14   CR  --  2.36* 3.89* 2.53*   GLC 85 86 169* 143*   JANENE  --  8.3* 9.4 8.6   MAG  --  1.8 2.3 2.0   PHOS  --  3.9 4.1 2.9       CBC -   Recent Labs   Lab Test 08/06/21  0655 08/05/21  0536 08/04/21 0347   WBC 24.0* 27.5* 22.8*   HGB 8.1* 8.9* 8.7*    311 194       LFTs -   Recent Labs   Lab Test 08/06/21  0655 08/05/21  0536 08/04/21  0347   ALKPHOS 132 147 136   BILITOTAL 3.4* 3.9* 4.1*   ALT 19 18 16   AST 43 41 34   PROTTOTAL 7.8 8.9* 8.0   ALBUMIN 2.0* 2.3* 2.3*       Iron Panel -   Recent Labs   Lab Test 07/19/21  0632   IRON 31*   IRONSAT 43           Current Medications:    [Auto Hold] sodium bicarbonate  325 mg Per Feeding Tube Q4H    And     [Auto Hold] amylase-lipase-protease  1-2 capsule Per Feeding Tube Q4H     [Auto Hold] amylase-lipase-protease  2 capsule Oral " TID w/meals     [Auto Hold] B and C vitamin Complex with folic acid  5 mL Per Feeding Tube Daily     [Auto Hold] folic acid  1 mg Oral Daily    Or     [Auto Hold] folic acid  1 mg Intravenous Daily     [Auto Hold] HYDROmorphone (STANDARD CONC)  2 mg Oral Q8H     [Auto Hold] lactulose  20 g Oral or NG Tube 4x Daily     [Auto Hold] melatonin  6 mg Oral At Bedtime     [Auto Hold] pantoprazole  40 mg Oral QAM AC     [Auto Hold] piperacillin-tazobactam  2.25 g Intravenous Q6H     [Auto Hold] rifaximin  550 mg Oral BID     [Auto Hold] sodium chloride (PF)  100 mL Irrigation Q4H LENKA     sodium chloride (PF)  3 mL Intracatheter Q8H     [Auto Hold] sodium chloride (PF)  3 mL Intracatheter Q8H     [Auto Hold] sodium chloride (PF)  3 mL Intracatheter Q8H     [Auto Hold] sodium chloride (PF)  73 mL Intravenous Once     [Auto Hold] vitamin B1  100 mg Oral Daily    Or     [Auto Hold] thiamine  100 mg Intravenous Daily       - MEDICATION INSTRUCTIONS -       dextrose

## 2021-08-06 NOTE — ANESTHESIA POSTPROCEDURE EVALUATION
Patient: Dax Villareal    Procedure(s):  ESOPHAGOGASTRODUODENOSCOPY (EGD) with necrosectomy, and stents exchanged    Diagnosis:Necrotizing pancreatitis [K85.91]  Diagnosis Additional Information: No value filed.    Anesthesia Type:  General    Note:  Disposition: Inpatient   Postop Pain Control: Uneventful            Sign Out: Well controlled pain   PONV: No   Neuro/Psych: Uneventful            Sign Out: Acceptable/Baseline neuro status   Airway/Respiratory: Uneventful            Sign Out: Acceptable/Baseline resp. status   CV/Hemodynamics: Uneventful            Sign Out: Acceptable CV status; No obvious hypovolemia; No obvious fluid overload   Other NRE:    DID A NON-ROUTINE EVENT OCCUR?            Last vitals:  Vitals Value Taken Time   /46 08/06/21 1655   Temp 36.4  C (97.5  F) 08/06/21 1655   Pulse 111 08/06/21 1655   Resp 22 08/06/21 1655   SpO2 96 % 08/06/21 1655       Electronically Signed By: Gaurav Gregg MD  August 6, 2021  6:05 PM

## 2021-08-07 LAB
ALBUMIN SERPL-MCNC: 1.7 G/DL (ref 3.4–5)
ALP SERPL-CCNC: 113 U/L (ref 40–150)
ALT SERPL W P-5'-P-CCNC: 20 U/L (ref 0–70)
ANION GAP SERPL CALCULATED.3IONS-SCNC: 13 MMOL/L (ref 3–14)
AST SERPL W P-5'-P-CCNC: 46 U/L (ref 0–45)
ATRIAL RATE - MUSE: 114 BPM
BILIRUB SERPL-MCNC: 3 MG/DL (ref 0.2–1.3)
BUN SERPL-MCNC: 27 MG/DL (ref 7–30)
CALCIUM SERPL-MCNC: 8.4 MG/DL (ref 8.5–10.1)
CHLORIDE BLD-SCNC: 91 MMOL/L (ref 94–109)
CO2 SERPL-SCNC: 22 MMOL/L (ref 20–32)
CREAT SERPL-MCNC: 3.31 MG/DL (ref 0.66–1.25)
CRP SERPL-MCNC: 81 MG/L (ref 0–8)
DIASTOLIC BLOOD PRESSURE - MUSE: NORMAL MMHG
ERYTHROCYTE [DISTWIDTH] IN BLOOD BY AUTOMATED COUNT: 15.8 % (ref 10–15)
ERYTHROCYTE [DISTWIDTH] IN BLOOD BY AUTOMATED COUNT: 16.7 % (ref 10–15)
GFR SERPL CREATININE-BSD FRML MDRD: 23 ML/MIN/1.73M2
GLUCOSE BLD-MCNC: 68 MG/DL (ref 70–99)
HCT VFR BLD AUTO: 21 % (ref 40–53)
HCT VFR BLD AUTO: 23.4 % (ref 40–53)
HGB BLD-MCNC: 6.9 G/DL (ref 13.3–17.7)
HGB BLD-MCNC: 8.1 G/DL (ref 13.3–17.7)
INTERPRETATION ECG - MUSE: NORMAL
LACTATE SERPL-SCNC: 2.1 MMOL/L (ref 0.7–2)
MAGNESIUM SERPL-MCNC: 1.8 MG/DL (ref 1.6–2.3)
MCH RBC QN AUTO: 30 PG (ref 26.5–33)
MCH RBC QN AUTO: 30.8 PG (ref 26.5–33)
MCHC RBC AUTO-ENTMCNC: 32.9 G/DL (ref 31.5–36.5)
MCHC RBC AUTO-ENTMCNC: 34.6 G/DL (ref 31.5–36.5)
MCV RBC AUTO: 89 FL (ref 78–100)
MCV RBC AUTO: 91 FL (ref 78–100)
P AXIS - MUSE: 42 DEGREES
PHOSPHATE SERPL-MCNC: 6.2 MG/DL (ref 2.5–4.5)
PLATELET # BLD AUTO: 267 10E3/UL (ref 150–450)
PLATELET # BLD AUTO: 269 10E3/UL (ref 150–450)
POTASSIUM BLD-SCNC: 3.9 MMOL/L (ref 3.4–5.3)
PR INTERVAL - MUSE: 150 MS
PROT SERPL-MCNC: 7 G/DL (ref 6.8–8.8)
QRS DURATION - MUSE: 90 MS
QT - MUSE: 350 MS
QTC - MUSE: 482 MS
R AXIS - MUSE: -14 DEGREES
RBC # BLD AUTO: 2.3 10E6/UL (ref 4.4–5.9)
RBC # BLD AUTO: 2.63 10E6/UL (ref 4.4–5.9)
SODIUM SERPL-SCNC: 126 MMOL/L (ref 133–144)
SYSTOLIC BLOOD PRESSURE - MUSE: NORMAL MMHG
T AXIS - MUSE: 35 DEGREES
VENTRICULAR RATE- MUSE: 114 BPM
WBC # BLD AUTO: 16.8 10E3/UL (ref 4–11)
WBC # BLD AUTO: 17.5 10E3/UL (ref 4–11)

## 2021-08-07 PROCEDURE — 250N000013 HC RX MED GY IP 250 OP 250 PS 637: Performed by: INTERNAL MEDICINE

## 2021-08-07 PROCEDURE — 86140 C-REACTIVE PROTEIN: CPT | Performed by: INTERNAL MEDICINE

## 2021-08-07 PROCEDURE — 36592 COLLECT BLOOD FROM PICC: CPT | Performed by: STUDENT IN AN ORGANIZED HEALTH CARE EDUCATION/TRAINING PROGRAM

## 2021-08-07 PROCEDURE — 250N000011 HC RX IP 250 OP 636: Performed by: STUDENT IN AN ORGANIZED HEALTH CARE EDUCATION/TRAINING PROGRAM

## 2021-08-07 PROCEDURE — P9016 RBC LEUKOCYTES REDUCED: HCPCS | Performed by: STUDENT IN AN ORGANIZED HEALTH CARE EDUCATION/TRAINING PROGRAM

## 2021-08-07 PROCEDURE — 250N000011 HC RX IP 250 OP 636: Performed by: INTERNAL MEDICINE

## 2021-08-07 PROCEDURE — 83605 ASSAY OF LACTIC ACID: CPT | Performed by: STUDENT IN AN ORGANIZED HEALTH CARE EDUCATION/TRAINING PROGRAM

## 2021-08-07 PROCEDURE — 86923 COMPATIBILITY TEST ELECTRIC: CPT

## 2021-08-07 PROCEDURE — 90937 HEMODIALYSIS REPEATED EVAL: CPT

## 2021-08-07 PROCEDURE — 93005 ELECTROCARDIOGRAM TRACING: CPT

## 2021-08-07 PROCEDURE — 258N000003 HC RX IP 258 OP 636: Performed by: CLINICAL NURSE SPECIALIST

## 2021-08-07 PROCEDURE — 99233 SBSQ HOSP IP/OBS HIGH 50: CPT | Mod: 25 | Performed by: INTERNAL MEDICINE

## 2021-08-07 PROCEDURE — 250N000009 HC RX 250: Performed by: INTERNAL MEDICINE

## 2021-08-07 PROCEDURE — 84100 ASSAY OF PHOSPHORUS: CPT | Performed by: INTERNAL MEDICINE

## 2021-08-07 PROCEDURE — 99233 SBSQ HOSP IP/OBS HIGH 50: CPT | Mod: GC | Performed by: INTERNAL MEDICINE

## 2021-08-07 PROCEDURE — 36592 COLLECT BLOOD FROM PICC: CPT | Performed by: INTERNAL MEDICINE

## 2021-08-07 PROCEDURE — 85014 HEMATOCRIT: CPT | Performed by: STUDENT IN AN ORGANIZED HEALTH CARE EDUCATION/TRAINING PROGRAM

## 2021-08-07 PROCEDURE — 86923 COMPATIBILITY TEST ELECTRIC: CPT | Performed by: STUDENT IN AN ORGANIZED HEALTH CARE EDUCATION/TRAINING PROGRAM

## 2021-08-07 PROCEDURE — 634N000001 HC RX 634: Performed by: CLINICAL NURSE SPECIALIST

## 2021-08-07 PROCEDURE — 120N000003 HC R&B IMCU UMMC

## 2021-08-07 PROCEDURE — 85027 COMPLETE CBC AUTOMATED: CPT | Performed by: INTERNAL MEDICINE

## 2021-08-07 PROCEDURE — 83735 ASSAY OF MAGNESIUM: CPT | Performed by: INTERNAL MEDICINE

## 2021-08-07 PROCEDURE — 80053 COMPREHEN METABOLIC PANEL: CPT | Performed by: INTERNAL MEDICINE

## 2021-08-07 RX ORDER — MAGNESIUM HYDROXIDE 1200 MG/15ML
LIQUID ORAL
Status: DISCONTINUED
Start: 2021-08-07 | End: 2021-08-07 | Stop reason: HOSPADM

## 2021-08-07 RX ORDER — HEPARIN SODIUM 5000 [USP'U]/.5ML
5000 INJECTION, SOLUTION INTRAVENOUS; SUBCUTANEOUS EVERY 12 HOURS
Status: DISCONTINUED | OUTPATIENT
Start: 2021-08-07 | End: 2021-08-20

## 2021-08-07 RX ADMIN — PANCRELIPASE 1 CAPSULE: 24000; 76000; 120000 CAPSULE, DELAYED RELEASE PELLETS ORAL at 05:32

## 2021-08-07 RX ADMIN — SODIUM BICARBONATE 325 MG: 325 TABLET ORAL at 05:31

## 2021-08-07 RX ADMIN — FOLIC ACID 1 MG: 1 TABLET ORAL at 18:55

## 2021-08-07 RX ADMIN — HYDROMORPHONE HYDROCHLORIDE 2 MG: 1 SOLUTION ORAL at 08:48

## 2021-08-07 RX ADMIN — MELATONIN TAB 3 MG 6 MG: 3 TAB at 22:38

## 2021-08-07 RX ADMIN — HYDROXYZINE HYDROCHLORIDE 50 MG: 25 TABLET, FILM COATED ORAL at 10:49

## 2021-08-07 RX ADMIN — SODIUM BICARBONATE 325 MG: 325 TABLET ORAL at 22:38

## 2021-08-07 RX ADMIN — PANCRELIPASE 2 CAPSULE: 24000; 76000; 120000 CAPSULE, DELAYED RELEASE PELLETS ORAL at 10:52

## 2021-08-07 RX ADMIN — SODIUM CHLORIDE 300 ML: 9 INJECTION, SOLUTION INTRAVENOUS at 13:21

## 2021-08-07 RX ADMIN — HYDROXYZINE HYDROCHLORIDE 50 MG: 25 TABLET, FILM COATED ORAL at 03:28

## 2021-08-07 RX ADMIN — ONDANSETRON 4 MG: 2 INJECTION INTRAMUSCULAR; INTRAVENOUS at 05:25

## 2021-08-07 RX ADMIN — HYDROMORPHONE HYDROCHLORIDE 1 MG: 1 SOLUTION ORAL at 12:17

## 2021-08-07 RX ADMIN — LACTULOSE 20 G: 10 SOLUTION ORAL; RECTAL at 23:18

## 2021-08-07 RX ADMIN — HYDROMORPHONE HYDROCHLORIDE 2 MG: 1 SOLUTION ORAL at 23:08

## 2021-08-07 RX ADMIN — SODIUM BICARBONATE 325 MG: 325 TABLET ORAL at 18:56

## 2021-08-07 RX ADMIN — Medication 550 MG: at 19:42

## 2021-08-07 RX ADMIN — PANCRELIPASE 2 CAPSULE: 24000; 76000; 120000 CAPSULE, DELAYED RELEASE PELLETS ORAL at 21:30

## 2021-08-07 RX ADMIN — PANCRELIPASE 2 CAPSULE: 24000; 76000; 120000 CAPSULE, DELAYED RELEASE PELLETS ORAL at 18:58

## 2021-08-07 RX ADMIN — CEFEPIME HYDROCHLORIDE 2 G: 2 INJECTION, POWDER, FOR SOLUTION INTRAVENOUS at 20:13

## 2021-08-07 RX ADMIN — HYDROMORPHONE HYDROCHLORIDE 2 MG: 1 SOLUTION ORAL at 15:57

## 2021-08-07 RX ADMIN — ONDANSETRON 4 MG: 2 INJECTION INTRAMUSCULAR; INTRAVENOUS at 23:16

## 2021-08-07 RX ADMIN — EPOETIN ALFA-EPBX 5000 UNITS: 10000 INJECTION, SOLUTION INTRAVENOUS; SUBCUTANEOUS at 13:48

## 2021-08-07 RX ADMIN — PANCRELIPASE 2 CAPSULE: 24000; 76000; 120000 CAPSULE, DELAYED RELEASE PELLETS ORAL at 22:39

## 2021-08-07 RX ADMIN — Medication 40 MG: at 10:49

## 2021-08-07 RX ADMIN — HYDROMORPHONE HYDROCHLORIDE 1 MG: 1 SOLUTION ORAL at 18:43

## 2021-08-07 RX ADMIN — METRONIDAZOLE 500 MG: 500 INJECTION, SOLUTION INTRAVENOUS at 06:09

## 2021-08-07 RX ADMIN — HYDROXYZINE HYDROCHLORIDE 50 MG: 25 TABLET, FILM COATED ORAL at 18:57

## 2021-08-07 RX ADMIN — THIAMINE HCL TAB 100 MG 100 MG: 100 TAB at 10:49

## 2021-08-07 RX ADMIN — LACTULOSE 20 G: 10 SOLUTION ORAL; RECTAL at 18:56

## 2021-08-07 RX ADMIN — SODIUM BICARBONATE 325 MG: 325 TABLET ORAL at 10:52

## 2021-08-07 RX ADMIN — HYDROMORPHONE HYDROCHLORIDE 1 MG: 1 SOLUTION ORAL at 22:58

## 2021-08-07 RX ADMIN — SODIUM CHLORIDE 250 ML: 9 INJECTION, SOLUTION INTRAVENOUS at 13:21

## 2021-08-07 RX ADMIN — ONDANSETRON 4 MG: 2 INJECTION INTRAMUSCULAR; INTRAVENOUS at 12:17

## 2021-08-07 RX ADMIN — Medication 5 ML: at 18:55

## 2021-08-07 RX ADMIN — HYDROMORPHONE HYDROCHLORIDE 1 MG: 1 SOLUTION ORAL at 01:52

## 2021-08-07 RX ADMIN — METRONIDAZOLE 500 MG: 500 INJECTION, SOLUTION INTRAVENOUS at 18:55

## 2021-08-07 RX ADMIN — Medication 550 MG: at 08:48

## 2021-08-07 ASSESSMENT — MIFFLIN-ST. JEOR
SCORE: 1602.5
SCORE: 1592.5

## 2021-08-07 ASSESSMENT — ACTIVITIES OF DAILY LIVING (ADL)
ADLS_ACUITY_SCORE: 19

## 2021-08-07 NOTE — PROGRESS NOTES
Nephrology Progress Note  08/07/2021         Dax Villareal is a 30 year old male with h/o ETOH hepatitis, end stage liver disease, RADHA on HD, transferred from [facility] in [place] for septic shock on 6/28/21 for treatment of necrotizing pancreatitis and decompensated liver disease. He was initially admitted to [facility] 5/19/21.        Interval History :   Mr Villareal was in the OR for EGD with necrosectomy, today in the room. He is feeling better and looks much better today, less distress. Dialysis today planned      Assessment & Recommendations:   RADHA-Cr was 0.8 as recently as 5/19/2021 but now HD dependent, started RRT at [facility] prior to transfer to [facility] (exact date unclear but per family it was end of May).  Etiology likely multifactorial with contrast, sepsis/pancreatitis, likely HRS physiology contributing as well.  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  Continuing RRT as long as it is tolerated with regards to BP's, is functional enough to possibly do outpt HD.  MELD is in high 30's so has high 3 month mortality but continuing restorative cares for now.  Had GI stenting of pancreas on 7/21 and 7/28.                 -Line is TDC from PTA               - HD today for volume and solute control     Volume status-Anuric, UF as able today     Electrolytes/pH-K 3.4 and Na low, improved today to 126, on fluid restriction for hyponatremia- will improve with HD     Ca/phos/pth-Ca 8.3, corrects to normal with low albumin.  Mg and Phos WNL.       Anemia-Hgb 8.7, acute management per team, last PRBC's 7/14.  Starting on EPO 5k with runs, iron sats 43%      Nutrition-Taking PO, appetite up and down.  not allowed to eat today though wants to         Recommendations were communicated to primary team via verbal communication.       Review of Systems:   I reviewed the following systems:  Gen: No fevers or chills  CV: No CP at rest  Resp: No SOB at rest  GI: + abd pain    Physical Exam:   I/O last 3 completed  "shifts:  In: 2050 [P.O.:10; I.V.:1700; Other:200; NG/GT:130]  Out: 7 [Emesis/NG output:5; Blood:2]   BP 98/50   Pulse 112   Temp 97.8  F (36.6  C) (Oral)   Resp 20   Ht 1.676 m (5' 5.98\")   Wt 70.5 kg (155 lb 6.8 oz)   SpO2 96%   BMI 25.10 kg/m       GENERAL APPEARANCE: looks better today, less distress  EYES:  scleral icterus, pupils equal  Pulmonary: lungs clear to auscultation. Off supplemental oxygen   CV: tachy   - Edema - no LE edema  GI: large distended, tender. Has retroperitoneal drain  MS: no evidence of inflammation in joints, no muscle tenderness  SKIN: no rash, warm, dry, no cyanosis  NEURO: alert/interactive  ACCESS: Right TDC    Labs:   All labs reviewed by me  Electrolytes/Renal -   Recent Labs   Lab Test 08/07/21 0637 08/06/21  2215 08/06/21  1255 08/06/21  0655 08/05/21  0536   *  --   --  124* 129*   POTASSIUM 3.9  --   --  3.4 3.3*   CHLORIDE 91*  --   --  90* 92*   CO2 22  --   --  24 24   BUN 27  --   --  14 28   CR 3.31*  --   --  2.36* 3.89*   GLC 68* 76 85 86 169*   JANENE 8.4*  --   --  8.3* 9.4   MAG 1.8  --   --  1.8 2.3   PHOS 6.2*  --   --  3.9 4.1       CBC -   Recent Labs   Lab Test 08/07/21 0637 08/06/21  0655 08/05/21  0536   WBC 17.5* 24.0* 27.5*   HGB 6.9* 8.1* 8.9*    316 311       LFTs -   Recent Labs   Lab Test 08/07/21 0637 08/06/21  0655 08/05/21  0536   ALKPHOS 113 132 147   BILITOTAL 3.0* 3.4* 3.9*   ALT 20 19 18   AST 46* 43 41   PROTTOTAL 7.0 7.8 8.9*   ALBUMIN 1.7* 2.0* 2.3*       Iron Panel -   Recent Labs   Lab Test 07/19/21  0632   IRON 31*   IRONSAT 43           Current Medications:    sodium chloride 0.9%  250 mL Intravenous Once in dialysis/CRRT     sodium chloride 0.9%  300 mL Hemodialysis Machine Once     sodium bicarbonate  325 mg Per Feeding Tube Q4H    And     amylase-lipase-protease  1-2 capsule Per Feeding Tube Q4H     amylase-lipase-protease  2 capsule Oral TID w/meals     B and C vitamin Complex with folic acid  5 mL Per Feeding Tube " Daily     ceFEPIme (MAXIPIME) IV  2 g Intravenous Q24H     epoetin charles-epbx (RETACRIT) inj ESRD  5,000 Units Intravenous Once in dialysis/CRRT     folic acid  1 mg Oral Daily    Or     folic acid  1 mg Intravenous Daily     HYDROmorphone (STANDARD CONC)  2 mg Oral Q8H     lactulose  20 g Oral or NG Tube 4x Daily     melatonin  6 mg Oral At Bedtime     metroNIDAZOLE  500 mg Intravenous Q12H     - MEDICATION INSTRUCTIONS -   Does not apply Once     pantoprazole  40 mg Oral QAM AC     rifaximin  550 mg Oral BID     sodium chloride (PF)  100 mL Irrigation Q4H LENKA     sodium chloride (PF)  3 mL Intracatheter Q8H     sodium chloride (PF)  3 mL Intracatheter Q8H     sodium chloride (PF)  73 mL Intravenous Once     sodium chloride (PF)  9 mL Intracatheter During Dialysis/CRRT (from stock)     sodium chloride (PF)  9 mL Intracatheter During Dialysis/CRRT (from stock)     vitamin B1  100 mg Oral Daily    Or     thiamine  100 mg Intravenous Daily       - MEDICATION INSTRUCTIONS -       abigail Kelley MD   of Medicine  Department of Nephrology  [facility]

## 2021-08-07 NOTE — PROGRESS NOTES
[facility]    Progress Note - [facility] 3 Service        Date of Admission:  6/28/2021    Assessment & Plan   Dax Villareal is a 30 year old male with hx of alcohol use disorder admitted on 6/28/2021 after recent prolonged admission at [facility] in [place] who has severe acute alcoholic hepatitis c/b HE, ESRD requiring HD, and malnutrition in the setting of acute necrotizing pancreatitis, s/p PEG-J placement on 7/13. He arrived with acute respiratory failure and septic shock that have resolved, and his secondary bacterial peritonitis has been appropriately treated. He requires continued management for ESRD, hepatic encephalopathy, infected pancreatic pseudocyst, nutritional support via PEG-J, and persistent right pleural effusion.     Today:  - Hgb 6.9, will give one unit of pRBC and recheck CBC  - Dialysis today  - Holding anticoagulation for 72 hours, low threshold for eval of DVT if clinically indicated  - Plan for CT and repeat debridement next week per GI  - Will trial regular diet with small bites. If vomiting, make NPO     # Necrotizing alcoholic pancreatitis w/ infected pseudocyst s/p RP Drain and Cystogastrostomy  # Septic Shock, resolved   # Leukocytosis    Admitted to the [facility] ICU on 6/28/2021 from [facility] in [place] for septic shock due to necrotizing pancreatitis. CT on admit showing mature collection with enhancing wall measuring ~[id number]. IR placed perc drain 6/29. Repeat imaging 7/18 revealed walled off necrotic collection amenable to endoscopic transluminal drainage with cystgastrostomy stenting with necrosectomy on 7/21/21.  7/28 - EGD, stent replacement (transluminal Axios with coaxial Solus), PEG-J removal (buried bumper) and replacement of new PEG-J with one new T-tag. Repeat necrosectomy and Axios removal/replacement Friday 8/6     - Continue Cefepime 1 gm Q 24 + flagyl through endoscopic debridement on 8/6 per GI  - Daily CBC,  watching fever curve  - GI following, appreciate recs              - Pancreatic enzymes   - Flush drain with 100ml q4hr per GI   - Blood cultures and fluid cultures NGTD    # Secondary Bacterial Peritonitis 2/2 infected pseudocyst  # Peripancreatic fluid collection growing VRE   7/2 - Diagnostic paracentesis (WBC 2663 - 74% PMN, SAAG <1.1, protein 4.1)     Antibiotics:   Current: Cefepime 1 gm Q 24 + flagyl 500 q8 (8/6-present)     Past:  - Linezolid 7/21-7/30  - Vancomcyin 6/29  - Meropenem 6/29-7/18  - Micafungin 6/29-7/1  - Cipro 7/19-7/21  - Ceftriaxone 7/21-7/24; 7/26-7/27  - Zosyn 7/27-8/6    - Paracentesis PRN      # Right hepatic/pancreatic hydrothorax  Intermittently increased subjective shortness of breath. CXR on 7/19 w/ increased RLL haziness; ddx includes pleural effusion v pneumonia v atelectasis. CT on 7/28 showed large volume R hepatic hydrothorax with complete atelectasis of right lung, bedside thora completed 7/28 with labs and cultures.     CXR 7/30 showed moderate R pleural effusion, unchanged from previous and improved aeration of right lung with small remaining pleural effusion on CT. Thora cultures negative.     - IR completed thoracentesis 7/28 - exudative  - ID following: agree to continue with empiric abx, though pleural effusion most likely 2/2 hepatic hydrothorax and pancreatitis. Defer plan for repeat thoracentesis based on symptoms and oxygen requirement. No plan for chest tube given concern for infected pleural effusion  - Supplemental oxygen as needed to keep sats above 89%  - Repeat Thoracentesis PRN for dyspnea    # Lactic acidosis  Multifactorial d/t decreased lactic clearance in the setting of acute hepatic failure, ESRD, and acute on chronic infection.  - Continue to trend and assess vitals     # Gastric Outlet Obstruction  High G tube output due to the cystogastric connection/fluid drainage noted on 8/5. Per GI, possibly related to gastric outlet from severe abdominal  inflammation and expected. KUB negative for obstructive gas pattern. Ileus from narcotics considered, but output not consistent with tube feeds  -Will trial regular diet with small bites. If vomiting, make NPO  -Anti-emetics  -Will monitor and continue to discuss with GI  -Having fluid removal per nephrology due to HD    #Hypokalemia  High GI output state. Replete as needed with ESRD noted. Improved with decreased tube feeds    # Severe malnutrition in setting of chronic illness  PEG-J placed 7/13. Patient intermittently refusing tube feeds d/t nausea/vomiting.  - Dietitian consult for new tube feeds, appreciate recs   - Continuous TF per dietician order (advancing VERY slowly) with goal of 65 ml/hr              - Cont panc enzymes   - Will trial regular diet with small bites. If vomiting, make NPO  - Regular Diet, thin liquids per SLP  - Continue multivitamins  - Pantoprazole 40 mg IV daily  - Zofran PRN     # Alcoholic hepatitis complicated by portal hypertension, ascites, and HE - improving   # Hx of Alcohol use disorder c/b withdrawal   # Anemia  # Coagulopathy   Patient with a history of marked alcohol use and alcoholic hepatitis in the past. Was given a course of steroids (1 week) during previous admission at OSH. Patient mental status has been stable, alert and oriented x4 after rifaxamin and aggressive lactulose. Not a liver transplant candidate.      - Lactulose 20g QID and PRN and rifaximin 550mg BID - as patient allows  - Trend MELD labs  - Daily folic acid and thiamine  - Delirium precautions  - Melatonin 6mg at bedtime  - Daily CBC   - Transfuse if hemoglobin < 7, platelet < 10 or < 30 with bleeding  - Continue to monitor for signs of bleeding      # Stage III RADHA on HD due to ATN  # ESRD due to ATN  # Hypervolemia causing acute respiratory failure, resolved  Nephrology note 7/21 indicates that patient has been on renal replacement therapy for two months and pt status has most likely transitioned from  ARF to ESRD at this point.          > Hemodialysis as per renal reccs              - Midodrine 10 mg PRN before dialysis sessions     > Daily chemistry labs, strict I/O, and daily weights  - Aspiration precautions  - Supplemental oxygen as needed to keep sats above 89%    #Anemia  Baseline hgb 17. Running between 6-8 in setting of chronic illness, new ESRD, frequent lab draws and procedures.  - Goal hgb >7.0, trend Hgb    # Intermittent lactic acidosis  Suspect due to poor lactate clearance due to ESRD and ESLD + ongoing inflammation/infection. No fluids given due to patient being on dialysis    # Apathy, concern for depression  Patient is intermittently refusing cares due to wanting a 'break,' but also reaffirms that he wants everything done to prolong his life. Mood is depressed, apathetic at times. Patient has multiple stressors including his own medical conditions and family health issues.  - Continue to offer emotional support services such as psychiatry, palliative,   - He declined psychiatry/psychology visits     # Goals of Care  Please see additional interval note for in depth details regarding conversation on 7/9 about goals of care. Patient's long term prognosis is still quite poor given his multi organ failure. Care conference on 7/15, pt did not wish to participate. Met with sistermargareth Cheney (on phone) and Melyssa and close friend Leroy with palliative, nephrology and general medicine teams to discuss Dax's current improving status, poor prognosis and wishes to continue full cares. *   - Hepatology confirmed pt will not be a transplant candidate  - Daily phone updates to sister Noni ([phone])       Diet: NPO for Medical/Clinical Reasons Except for: Meds, Ice Chips  Adult Formula Drip Feeding: Continuous Vital 1.5; Jejunostomy; Goal Rate: 60; mL/hr; Medication - Feeding Tube Flush Frequency: At least 15-30 mL water before and after medication administration and with tube clogging; 10 mL/hr    DVT  Prophylaxis: Pneumatic Compression Devices  Rdz Catheter: Not present  Fluids: 10 mL NS TKO  Central Lines: PRESENT  PICC Triple Lumen 06/28/21 Left-Site Assessment: WDL  CVC Double Lumen Right-Site Assessment: WDL  Code Status: Full Code      Disposition Plan   Expected discharge: >7 days, recommended to transitional care unit once  nutrition improved, infected pancreatic pseudocyst is well managed and plan for dialysis is in place.       Johny Ortiz MD  [phone]    [facility]  Securely message with the Vocera Web Console (learn more here)  Text page via [employer] Paging/Directory  Please see sign in/sign out for up to date coverage information  _____________________________________________________________________    Interval History   Low hgb this AM, refused tube feeds overnight, tolerarted necresectomy yesterday without any known complications. Sepsis protocol during dialysis no intervention done.    Data reviewed today: I reviewed all medications, new labs and imaging results over the last 24 hours.    Physical Exam   Vital Signs: Temp: 97.6  F (36.4  C) Temp src: Oral BP: 122/57 Pulse: 110   Resp: 22 SpO2: 98 % O2 Device: None (Room air) Oxygen Delivery: 3 LPM  Weight: 155 lbs 6.79 oz  Constitutional: Asleep, NAD  HEENT: Normocephalic. Atraumatic. + Scleral icterus   Cardiovascular: tachycardic  Respiratory: 1LPM NC  Musculoskeletal: Emaciated  Skin: Jaundiced    Data   Recent Labs   Lab 08/07/21  0637 08/06/21  2215 08/06/21  1255 08/06/21  1002 08/06/21  0655 08/05/21  0536 08/04/21  0347 08/01/21  1414   WBC 17.5*  --   --   --  24.0* 27.5* 22.8* 14.0*   HGB 6.9*  --   --   --  8.1* 8.9* 8.7* 8.1*   MCV 91  --   --   --  91 91 92 93     --   --   --  316 311 194 98*   INR  --   --   --  1.68*  --   --   --  1.69*   NA  --   --   --   --  124* 129* 132* 134   POTASSIUM  --   --   --   --  3.4 3.3* 3.2* 3.0*   CHLORIDE  --   --   --   --  90* 92* 97  97   CO2  --   --   --   --  24 24 25 28   BUN  --   --   --   --  14 28 14 22   CR  --   --   --   --  2.36* 3.89* 2.53* 2.68*   ANIONGAP  --   --   --   --  10 13 10 9   JANENE  --   --   --   --  8.3* 9.4 8.6 8.2*   GLC  --  76 85  --  86 169* 143* 131*   ALBUMIN  --   --   --   --  2.0* 2.3* 2.3* 2.0*   PROTTOTAL  --   --   --   --  7.8 8.9* 8.0 7.3   BILITOTAL  --   --   --   --  3.4* 3.9* 4.1* 4.2*   ALKPHOS  --   --   --   --  132 147 136 122   ALT  --   --   --   --  19 18 16 15   AST  --   --   --   --  43 41 34 28     Recent Results (from the past 24 hour(s))   XR Surgery CESIA Fluoro L/T 5 Min w Stills    Narrative    This exam was marked as non-reportable because it will not be read by a   radiologist or a [place] non-radiologist provider.

## 2021-08-07 NOTE — PROVIDER NOTIFICATION
08/07/21 1500   Call Information   Date of Call 08/07/21   Time of Call 1458   Name of person requesting the team Donna Louis   Title of person requesting team RN   RRT Arrival time 1505   Time RRT ended 1540   Reason for call   Type of RRT Adult   Primary reason for call Sepsis suspected   Sepsis Suspected Elevated Lactate level;Heart Rate > 100   Was patient transferred from the ED, ICU, or PACU within last 24 hours prior to RRT call? No   SBAR   Situation LA- 2,1   Background 30 year old male with PMHx significant for alcohol use disorder who has had a prolonged hospitalization at [facility] for acute alcohol hepatitis/end stage liver disease complicated by hepatic encephalopathy, acute renal failure requiring hemodialysis, acute hypoxemic respiratory failure and septic shock secondary to acute necrotizing pancreatitis. Transferred to [facility] ICU on 6/28/2021 for further management of necrotizing pancreatitis 30 year old male with PMHx significant for alcohol use disorder who has had a prolonged hospitalization at [facility] for acute alcohol hepatitis/end stage liver disease complicated by hepatic encephalopathy, acute renal failure requiring hemodialysis, acute hypoxemic respiratory failure and septic shock secondary to acute necrotizing pancreatitis. Transferred to [facility] ICU on 6/28/2021 for further management of necrotizing pancreatitis 30 year old male with PMHx significant for alcohol use disorder who has had a prolonged hospitalization at [facility] for acute alcohol hepatitis/end stage liver disease complicated by hepatic encephalopathy, acute renal failure requiring hemodialysis, acute hypoxemic respiratory failure and septic shock secondary to acute necrotizing pancreatitis. Transferred to [facility] ICU on 6/28/2021 for further management of necrotizing pancreatitis 30 year old male with PMHx significant for alcohol use disorder who has had a  prolonged hospitalization at [facility] in [place] for acute alcohol hepatitis/end stage liver disease complicated by hepatic encephalopathy, acute renal failure requiring hemodialysis, acute hypoxemic respiratory failure and septic shock secondary to acute necrotizing pancreatitis. Transferred to [facility] ICU on 6/28/2021 for further management of necrotizing pancreatitis 30 year old male with PMHx significant for alcohol use disorder who has had a prolonged hospitalization at [facility] in [place] for acute alcohol hepatitis/end stage liver disease complicated by hepatic encephalopathy, acute renal failure requiring hemodialysis, acute hypoxemic respiratory failure and septic shock secondary to acute necrotizing pancreatitis. Transferred to [facility] ICU on 6/28/2021 for further management of necrotizing pancreatitis 30 year old male with PMHx significant for alcohol use disorder who has had a prolonged hospitalization at [facility] for acute alcohol hepatitis/end stage liver disease complicated by hepati   Notable History/Conditions End-Stage disease;Organ failure;Recent surgery   Assessment Arousable, hooked up to dialysis run   Interventions No interventions   Patient Outcome   Patient Outcome Stabilized on unit   RRT Team   Attending/Primary/Covering Physician Sharon 3   Date Attending Physician notified 08/07/21   Time Attending Physician notified [id number]   Physician(s) Kaylie DONOVAN   Lead RN Morgan HUNG   RN Donna JAMA   RT SALAZAR   Post RRT Intervention Assessment   Post RRT Assessment Stable/Improved   Date Follow Up Done 08/07/21   Time Follow Up Done [id number]

## 2021-08-07 NOTE — PLAN OF CARE
"Neuro: A&Ox4.   Cardiac: ST 110s BP (!) 97/35   Pulse 112   Temp 97.8  F (36.6  C) (Oral)   Resp 16   Ht 1.676 m (5' 5.98\")   Wt 70.5 kg (155 lb 6.8 oz)   SpO2 98%   BMI 25.10 kg/m      Respiratory: Sating  on RA, pt requesting 2L NC for comfort occasionally   GI/: No urine output on HD. BM X1  Diet/appetite:NPO diet with ice chips. G tube to gravity and J tube for tube feeds at 60ml/hr  Activity:  Assist of x2 turning in bed   Pain: Dilaudid for Abdominal pain  Skin: Peeling and cracked feet, redness on bottom cream applied   LDA's: PICC x3 lumens, Biliary drain     Plan: RBCs given for hemoglobin 6.9. Continue with POC. Notify primary team with changes.   "

## 2021-08-07 NOTE — PROGRESS NOTES
Sepsis Evaluation Progress Note    I was called to see Dax Villareal due to abnormal vital signs triggering the Sepsis SIRS screening alert. He is not known to have an infection.     Physical Exam   Vital Signs:  Temp: 97.8  F (36.6  C) Temp src: Oral BP: (!) 94/37 Pulse: 108   Resp: 14 SpO2: 98 % O2 Device: None (Room air) Oxygen Delivery: 2 LPM     General: chronically ill appearing  Mental Status: AAOx4.       Data   Lactic Acid   Date Value Ref Range Status   08/07/2021 2.1 (H) 0.7 - 2.0 mmol/L Final   08/06/2021 1.9 0.7 - 2.0 mmol/L Final   07/10/2021 1.4 0.7 - 2.0 mmol/L Final   07/09/2021 2.8 (H) 0.7 - 2.0 mmol/L Final     Lactate for Sepsis Protocol   Date Value Ref Range Status   07/08/2021 1.1 0.7 - 2.0 mmol/L Final   07/07/2021 0.9 0.7 - 2.0 mmol/L Final       Assessment & Plan   NO EVIDENCE OF SEPSIS at this time.  Vital sign, physical exam, and lab findings are due to dialysis.    Disposition: The patient will remain on the current unit. We will continue to monitor this patient closely..  VENUS Sosa    Sepsis Criteria   Sepsis: 2+ SIRS criteria due to infection  Severe Sepsis: Sepsis AND 1+ new sign of acute organ dysfunction (Note: lactate >2 or acute encephalopathy each qualify as organ dysfunction)  Septic Shock: Sepsis AND hypotension despite volume resuscitation with 30 ml/kg crystalloid or lactate >=4  Note: HYPOTENSION is defined as 2 BP readings measured 3 hrs apart that have a SBP <90, MAP <65, or decrease >40 mmHg, occurring 6 hrs before or after t-zero

## 2021-08-07 NOTE — PLAN OF CARE
"[id number]    Neuro: A&Ox4. Able to move all extremities. Intermittently refusing cares/assessments/meds.  Cardiac: SR-ST 90-100s. VSS.   Respiratory: Sating >95% on RA, wears 3L NC at times for comfort. No SOB. Refused capno post procedure after 2am.  GI/: Anuric, on HD. BM X1.   Diet/appetite: NPO with ice chips and meds. Orders for continuous TF at 60ml/hr goal; pt refused starting after procedure stating he didn't \"want to poop all night\". Pt agreed to start TF at 5:45am, started at 10ml/hr, will advance to goal as tolerated. Zofran x2 for nausea/abdominal discomfort, no emesis.   Activity:  Assist of 2 with major position changes. Q2hr repos encouraged, pt educated on importance; intermittently refuses.  Pain: Abdominal pain managed with scheduled dilaudid and prn x3, atarax x2.   Skin: PEG-J site macerated, pus discharge, mild leaking. L drain site UTV, pt refused assessments on it. Sacrum blanchable.  LDA's:  -L PICC: TKO/abx, SL  -R PIV: removed  -L drain: Daniela CC notified overnight of continued no drain output (after scheduled irrigations)  -G tube: to gravity - no output  -J tube: TF    Plan: Plan for HD in AM. Still need sputum sample. Continue with POC. Notify primary team with changes.    "

## 2021-08-07 NOTE — PROGRESS NOTES
HEMODIALYSIS TREATMENT NOTE    Date: 8/7/2021  Time: 5:12 PM    Data:  Pre Wt: 70.5 kg (155 lb 6.8 oz)   Desired Wt: 67.5 kg   Post Wt: 69.5 kg (153 lb 3.5 oz) (estimate)  Weight change: 1 kg  Ultrafiltration - Post Run Net Total Removed (mL): 1000 mL  Vascular Access Status: CVC  patent  Dialyzer Rinse: Streaked  Total Blood Volume Processed: 67.79 L   Total Dialysis (Treatment) Time: 3.5 hr   Dialysate Bath: K 4, Ca 3  Heparin: None    Lab:   No    Interventions & Assessment:  Writer assumed care of pt with approx 1.5hrs remaining of TX.  3.5hr TX, 1L removed.  Per report from previous dialysis nurse, unable to obtain full goal d/t soft BP.  Remaining 1.5hr of TX uneventful; pt remained asymptomatic with no complaints.  CVC saline locked with clearguards in place. Handoff report given to PCN.     Plan:    Per renal

## 2021-08-08 ENCOUNTER — APPOINTMENT (OUTPATIENT)
Dept: GENERAL RADIOLOGY | Facility: CLINIC | Age: 31
End: 2021-08-08
Attending: INTERNAL MEDICINE
Payer: MEDICAID

## 2021-08-08 ENCOUNTER — APPOINTMENT (OUTPATIENT)
Dept: CT IMAGING | Facility: CLINIC | Age: 31
End: 2021-08-08
Attending: INTERNAL MEDICINE
Payer: MEDICAID

## 2021-08-08 LAB
ALBUMIN SERPL-MCNC: 1.6 G/DL (ref 3.4–5)
ALP SERPL-CCNC: 126 U/L (ref 40–150)
ALT SERPL W P-5'-P-CCNC: 24 U/L (ref 0–70)
ANION GAP SERPL CALCULATED.3IONS-SCNC: 10 MMOL/L (ref 3–14)
AST SERPL W P-5'-P-CCNC: 51 U/L (ref 0–45)
BILIRUB SERPL-MCNC: 2.8 MG/DL (ref 0.2–1.3)
BUN SERPL-MCNC: 13 MG/DL (ref 7–30)
CALCIUM SERPL-MCNC: 8 MG/DL (ref 8.5–10.1)
CHLORIDE BLD-SCNC: 97 MMOL/L (ref 94–109)
CO2 SERPL-SCNC: 22 MMOL/L (ref 20–32)
CREAT SERPL-MCNC: 2.01 MG/DL (ref 0.66–1.25)
ERYTHROCYTE [DISTWIDTH] IN BLOOD BY AUTOMATED COUNT: 16.1 % (ref 10–15)
ERYTHROCYTE [DISTWIDTH] IN BLOOD BY AUTOMATED COUNT: 16.3 % (ref 10–15)
GFR SERPL CREATININE-BSD FRML MDRD: 43 ML/MIN/1.73M2
GLUCOSE BLD-MCNC: 128 MG/DL (ref 70–99)
HCT VFR BLD AUTO: 23.7 % (ref 40–53)
HCT VFR BLD AUTO: 24 % (ref 40–53)
HGB BLD-MCNC: 8 G/DL (ref 13.3–17.7)
INR PPP: 1.74 (ref 0.85–1.15)
LACTATE SERPL-SCNC: 1.5 MMOL/L (ref 0.7–2)
MAGNESIUM SERPL-MCNC: 1.5 MG/DL (ref 1.6–2.3)
MCH RBC QN AUTO: 29.4 PG (ref 26.5–33)
MCH RBC QN AUTO: 29.6 PG (ref 26.5–33)
MCHC RBC AUTO-ENTMCNC: 33.3 G/DL (ref 31.5–36.5)
MCHC RBC AUTO-ENTMCNC: 33.8 G/DL (ref 31.5–36.5)
MCV RBC AUTO: 87 FL (ref 78–100)
MCV RBC AUTO: 89 FL (ref 78–100)
NT-PROBNP SERPL-MCNC: 5035 PG/ML (ref 0–450)
PHOSPHATE SERPL-MCNC: 2.3 MG/DL (ref 2.5–4.5)
PLATELET # BLD AUTO: 264 10E3/UL (ref 150–450)
PLATELET # BLD AUTO: 267 10E3/UL (ref 150–450)
POTASSIUM BLD-SCNC: 3.9 MMOL/L (ref 3.4–5.3)
PROT SERPL-MCNC: 7 G/DL (ref 6.8–8.8)
RBC # BLD AUTO: 2.7 10E6/UL (ref 4.4–5.9)
RBC # BLD AUTO: 2.72 10E6/UL (ref 4.4–5.9)
SODIUM SERPL-SCNC: 129 MMOL/L (ref 133–144)
TROPONIN I SERPL-MCNC: <0.015 UG/L (ref 0–0.04)
TROPONIN I SERPL-MCNC: <0.015 UG/L (ref 0–0.04)
WBC # BLD AUTO: 18.8 10E3/UL (ref 4–11)
WBC # BLD AUTO: 18.9 10E3/UL (ref 4–11)

## 2021-08-08 PROCEDURE — 250N000011 HC RX IP 250 OP 636: Performed by: STUDENT IN AN ORGANIZED HEALTH CARE EDUCATION/TRAINING PROGRAM

## 2021-08-08 PROCEDURE — 120N000003 HC R&B IMCU UMMC

## 2021-08-08 PROCEDURE — C9113 INJ PANTOPRAZOLE SODIUM, VIA: HCPCS

## 2021-08-08 PROCEDURE — 36592 COLLECT BLOOD FROM PICC: CPT | Performed by: STUDENT IN AN ORGANIZED HEALTH CARE EDUCATION/TRAINING PROGRAM

## 2021-08-08 PROCEDURE — 258N000003 HC RX IP 258 OP 636: Performed by: STUDENT IN AN ORGANIZED HEALTH CARE EDUCATION/TRAINING PROGRAM

## 2021-08-08 PROCEDURE — 83605 ASSAY OF LACTIC ACID: CPT | Performed by: STUDENT IN AN ORGANIZED HEALTH CARE EDUCATION/TRAINING PROGRAM

## 2021-08-08 PROCEDURE — 71275 CT ANGIOGRAPHY CHEST: CPT | Mod: 26 | Performed by: RADIOLOGY

## 2021-08-08 PROCEDURE — 250N000011 HC RX IP 250 OP 636

## 2021-08-08 PROCEDURE — 36592 COLLECT BLOOD FROM PICC: CPT | Performed by: INTERNAL MEDICINE

## 2021-08-08 PROCEDURE — 85610 PROTHROMBIN TIME: CPT

## 2021-08-08 PROCEDURE — 99207 PR CDG-CUT & PASTE-POTENTIAL IMPACT ON LEVEL: CPT | Performed by: INTERNAL MEDICINE

## 2021-08-08 PROCEDURE — 250N000011 HC RX IP 250 OP 636: Performed by: INTERNAL MEDICINE

## 2021-08-08 PROCEDURE — 250N000013 HC RX MED GY IP 250 OP 250 PS 637: Performed by: INTERNAL MEDICINE

## 2021-08-08 PROCEDURE — 80053 COMPREHEN METABOLIC PANEL: CPT | Performed by: INTERNAL MEDICINE

## 2021-08-08 PROCEDURE — 250N000009 HC RX 250: Performed by: INTERNAL MEDICINE

## 2021-08-08 PROCEDURE — 250N000013 HC RX MED GY IP 250 OP 250 PS 637: Performed by: STUDENT IN AN ORGANIZED HEALTH CARE EDUCATION/TRAINING PROGRAM

## 2021-08-08 PROCEDURE — 99233 SBSQ HOSP IP/OBS HIGH 50: CPT | Mod: 24 | Performed by: INTERNAL MEDICINE

## 2021-08-08 PROCEDURE — 83880 ASSAY OF NATRIURETIC PEPTIDE: CPT | Performed by: STUDENT IN AN ORGANIZED HEALTH CARE EDUCATION/TRAINING PROGRAM

## 2021-08-08 PROCEDURE — 83735 ASSAY OF MAGNESIUM: CPT | Performed by: INTERNAL MEDICINE

## 2021-08-08 PROCEDURE — 99233 SBSQ HOSP IP/OBS HIGH 50: CPT | Mod: GC | Performed by: INTERNAL MEDICINE

## 2021-08-08 PROCEDURE — 86901 BLOOD TYPING SEROLOGIC RH(D): CPT

## 2021-08-08 PROCEDURE — 85027 COMPLETE CBC AUTOMATED: CPT | Performed by: INTERNAL MEDICINE

## 2021-08-08 PROCEDURE — 250N000013 HC RX MED GY IP 250 OP 250 PS 637

## 2021-08-08 PROCEDURE — 71275 CT ANGIOGRAPHY CHEST: CPT

## 2021-08-08 PROCEDURE — 71045 X-RAY EXAM CHEST 1 VIEW: CPT | Mod: 26 | Performed by: RADIOLOGY

## 2021-08-08 PROCEDURE — 84100 ASSAY OF PHOSPHORUS: CPT | Performed by: INTERNAL MEDICINE

## 2021-08-08 PROCEDURE — 84484 ASSAY OF TROPONIN QUANT: CPT | Performed by: STUDENT IN AN ORGANIZED HEALTH CARE EDUCATION/TRAINING PROGRAM

## 2021-08-08 PROCEDURE — 71045 X-RAY EXAM CHEST 1 VIEW: CPT

## 2021-08-08 PROCEDURE — 250N000009 HC RX 250: Performed by: STUDENT IN AN ORGANIZED HEALTH CARE EDUCATION/TRAINING PROGRAM

## 2021-08-08 PROCEDURE — 86850 RBC ANTIBODY SCREEN: CPT

## 2021-08-08 RX ORDER — HEPARIN SODIUM 10000 [USP'U]/100ML
0-5000 INJECTION, SOLUTION INTRAVENOUS CONTINUOUS
Status: DISCONTINUED | OUTPATIENT
Start: 2021-08-08 | End: 2021-08-11

## 2021-08-08 RX ORDER — CEFEPIME HYDROCHLORIDE 1 G/1
1 INJECTION, POWDER, FOR SOLUTION INTRAMUSCULAR; INTRAVENOUS EVERY 24 HOURS
Status: DISCONTINUED | OUTPATIENT
Start: 2021-08-08 | End: 2021-08-12

## 2021-08-08 RX ORDER — FUROSEMIDE 10 MG/ML
40 INJECTION INTRAMUSCULAR; INTRAVENOUS ONCE
Status: COMPLETED | OUTPATIENT
Start: 2021-08-08 | End: 2021-08-08

## 2021-08-08 RX ORDER — HEPARIN SODIUM 10000 [USP'U]/100ML
0-5000 INJECTION, SOLUTION INTRAVENOUS CONTINUOUS
Status: DISCONTINUED | OUTPATIENT
Start: 2021-08-08 | End: 2021-08-08

## 2021-08-08 RX ORDER — LACTULOSE 10 G/15ML
20 SOLUTION ORAL DAILY
Status: DISCONTINUED | OUTPATIENT
Start: 2021-08-09 | End: 2021-08-09

## 2021-08-08 RX ORDER — IOPAMIDOL 755 MG/ML
57 INJECTION, SOLUTION INTRAVASCULAR ONCE
Status: COMPLETED | OUTPATIENT
Start: 2021-08-08 | End: 2021-08-08

## 2021-08-08 RX ORDER — MAGNESIUM SULFATE HEPTAHYDRATE 40 MG/ML
2 INJECTION, SOLUTION INTRAVENOUS ONCE
Status: COMPLETED | OUTPATIENT
Start: 2021-08-08 | End: 2021-08-08

## 2021-08-08 RX ADMIN — MAGNESIUM SULFATE HEPTAHYDRATE 2 G: 40 INJECTION, SOLUTION INTRAVENOUS at 14:01

## 2021-08-08 RX ADMIN — SODIUM BICARBONATE 325 MG: 325 TABLET ORAL at 20:59

## 2021-08-08 RX ADMIN — HYDROMORPHONE HYDROCHLORIDE 1 MG: 1 SOLUTION ORAL at 02:50

## 2021-08-08 RX ADMIN — PROCHLORPERAZINE EDISYLATE 5 MG: 5 INJECTION INTRAMUSCULAR; INTRAVENOUS at 20:51

## 2021-08-08 RX ADMIN — FUROSEMIDE 40 MG: 10 INJECTION, SOLUTION INTRAVENOUS at 23:51

## 2021-08-08 RX ADMIN — THIAMINE HCL TAB 100 MG 100 MG: 100 TAB at 08:02

## 2021-08-08 RX ADMIN — SODIUM BICARBONATE 325 MG: 325 TABLET ORAL at 23:56

## 2021-08-08 RX ADMIN — HYDROMORPHONE HYDROCHLORIDE 2 MG: 1 SOLUTION ORAL at 06:50

## 2021-08-08 RX ADMIN — GUAIFENESIN 10 ML: 200 SOLUTION ORAL at 08:01

## 2021-08-08 RX ADMIN — PANTOPRAZOLE SODIUM 40 MG: 40 INJECTION, POWDER, FOR SOLUTION INTRAVENOUS at 23:44

## 2021-08-08 RX ADMIN — PANCRELIPASE 2 CAPSULE: 24000; 76000; 120000 CAPSULE, DELAYED RELEASE PELLETS ORAL at 10:18

## 2021-08-08 RX ADMIN — PANCRELIPASE 2 CAPSULE: 24000; 76000; 120000 CAPSULE, DELAYED RELEASE PELLETS ORAL at 01:55

## 2021-08-08 RX ADMIN — SODIUM BICARBONATE 325 MG: 325 TABLET ORAL at 10:18

## 2021-08-08 RX ADMIN — Medication 550 MG: at 20:49

## 2021-08-08 RX ADMIN — SODIUM BICARBONATE 325 MG: 325 TABLET ORAL at 05:33

## 2021-08-08 RX ADMIN — GUAIFENESIN 10 ML: 200 SOLUTION ORAL at 03:33

## 2021-08-08 RX ADMIN — Medication 550 MG: at 09:45

## 2021-08-08 RX ADMIN — FOLIC ACID 1 MG: 1 TABLET ORAL at 08:02

## 2021-08-08 RX ADMIN — CEFEPIME HYDROCHLORIDE 1 G: 1 INJECTION, POWDER, FOR SOLUTION INTRAMUSCULAR; INTRAVENOUS at 21:05

## 2021-08-08 RX ADMIN — HEPARIN SODIUM 1300 UNITS/HR: 10000 INJECTION, SOLUTION INTRAVENOUS at 18:18

## 2021-08-08 RX ADMIN — POTASSIUM & SODIUM PHOSPHATES POWDER PACK 280-160-250 MG 1 PACKET: 280-160-250 PACK at 14:01

## 2021-08-08 RX ADMIN — ONDANSETRON 4 MG: 2 INJECTION INTRAMUSCULAR; INTRAVENOUS at 18:18

## 2021-08-08 RX ADMIN — METRONIDAZOLE 500 MG: 500 INJECTION, SOLUTION INTRAVENOUS at 05:34

## 2021-08-08 RX ADMIN — POTASSIUM & SODIUM PHOSPHATES POWDER PACK 280-160-250 MG 1 PACKET: 280-160-250 PACK at 21:01

## 2021-08-08 RX ADMIN — HYDROXYZINE HYDROCHLORIDE 50 MG: 25 TABLET, FILM COATED ORAL at 21:02

## 2021-08-08 RX ADMIN — PANCRELIPASE 2 CAPSULE: 24000; 76000; 120000 CAPSULE, DELAYED RELEASE PELLETS ORAL at 05:33

## 2021-08-08 RX ADMIN — GUAIFENESIN 10 ML: 200 SOLUTION ORAL at 14:48

## 2021-08-08 RX ADMIN — ONDANSETRON 4 MG: 2 INJECTION INTRAMUSCULAR; INTRAVENOUS at 08:01

## 2021-08-08 RX ADMIN — PANCRELIPASE 2 CAPSULE: 24000; 76000; 120000 CAPSULE, DELAYED RELEASE PELLETS ORAL at 23:56

## 2021-08-08 RX ADMIN — SODIUM PHOSPHATE, MONOBASIC, MONOHYDRATE 15 MMOL: 276; 142 INJECTION, SOLUTION INTRAVENOUS at 15:12

## 2021-08-08 RX ADMIN — ONDANSETRON 4 MG: 2 INJECTION INTRAMUSCULAR; INTRAVENOUS at 23:38

## 2021-08-08 RX ADMIN — HYDROMORPHONE HYDROCHLORIDE 1 MG: 1 SOLUTION ORAL at 12:26

## 2021-08-08 RX ADMIN — METRONIDAZOLE 500 MG: 500 INJECTION, SOLUTION INTRAVENOUS at 18:19

## 2021-08-08 RX ADMIN — PANCRELIPASE 2 CAPSULE: 24000; 76000; 120000 CAPSULE, DELAYED RELEASE PELLETS ORAL at 16:13

## 2021-08-08 RX ADMIN — HYDROXYZINE HYDROCHLORIDE 50 MG: 25 TABLET, FILM COATED ORAL at 14:49

## 2021-08-08 RX ADMIN — HYDROMORPHONE HYDROCHLORIDE 2 MG: 1 SOLUTION ORAL at 14:48

## 2021-08-08 RX ADMIN — Medication 40 MG: at 09:45

## 2021-08-08 RX ADMIN — IOPAMIDOL 57 ML: 755 INJECTION, SOLUTION INTRAVENOUS at 13:34

## 2021-08-08 RX ADMIN — PANCRELIPASE 2 CAPSULE: 24000; 76000; 120000 CAPSULE, DELAYED RELEASE PELLETS ORAL at 21:00

## 2021-08-08 RX ADMIN — Medication 5 ML: at 09:45

## 2021-08-08 RX ADMIN — HYDROXYZINE HYDROCHLORIDE 50 MG: 25 TABLET, FILM COATED ORAL at 02:50

## 2021-08-08 RX ADMIN — SODIUM BICARBONATE 325 MG: 325 TABLET ORAL at 16:13

## 2021-08-08 RX ADMIN — SODIUM BICARBONATE 325 MG: 325 TABLET ORAL at 01:55

## 2021-08-08 ASSESSMENT — ACTIVITIES OF DAILY LIVING (ADL)
ADLS_ACUITY_SCORE: 18
ADLS_ACUITY_SCORE: 20
ADLS_ACUITY_SCORE: 18

## 2021-08-08 ASSESSMENT — MIFFLIN-ST. JEOR: SCORE: 1607.5

## 2021-08-08 NOTE — PLAN OF CARE
SLP: SLP consult received.  Pt is known to SLP service with recommended regular diet and thin liquids per discharge note completed 7/15/21.  SLP has been consulted for closer assessment of safety of oropharyngeal swallowing mechanism.  Plan to complete VFSS 8/9/21 to r/o aspiration.  Discussed plan MD team, MD in agreement and order placed.

## 2021-08-08 NOTE — PROGRESS NOTES
Nephrology Progress Note  08/08/2021         Dax Villareal is a 30 year old male with h/o ETOH hepatitis, end stage liver disease, RADHA on HD, transferred from [facility] in [place] for septic shock on 6/28/21 for treatment of necrotizing pancreatitis and decompensated liver disease. He was initially admitted to [facility] 5/19/21.        Interval History :   Mr Villareal was in the OR for EGD with necrosectomy Friday, had HD yesterday. He is not feeling well today      Assessment & Recommendations:   RADHA-Cr was 0.8 as recently as 5/19/2021 but now HD dependent, started RRT at [facility] prior to transfer to [facility] (exact date unclear but per family it was end of May).  Etiology likely multifactorial with contrast, sepsis/pancreatitis, likely HRS physiology contributing as well.  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  Continuing RRT as long as it is tolerated with regards to BP's, is functional enough to possibly do outpt HD.  MELD is in high 30's so has high 3 month mortality but continuing restorative cares for now.  Had GI stenting of pancreas on 7/21 and 7/28.                 -Line is TDC from PTA               - HD Saturday, next HD Tuesday most likely     Volume status-Anuric, near euvolemic     Electrolytes/pH-K normal and Na low, improved, on fluid restriction for hyponatremia- NPO now ? aspiration     Ca/phos/pth-Ca 8, corrects to normal with low albumin.  Mg and Phos WNL.       Anemia-Hgb low, acute management per team, last PRBC's 7/14.  Started on EPO 5k with runs, iron sats 43%      Nutrition-Taking PO, appetite up and down.  not allowed to eat today though wants to         Recommendations were communicated to primary team via note      Review of Systems:   I reviewed the following systems:  Gen: No fevers or chills  CV: No CP at rest  Resp: No SOB at rest  GI: + abd pain    Physical Exam:   I/O last 3 completed shifts:  In: 3380 [P.O.:960; I.V.:370; Other:400; NG/GT:450]  Out: 2915 [Emesis/NG  "output:745; Drains:1170; Other:1000]   /52 (BP Location: Right arm)   Pulse 118   Temp 97.7  F (36.5  C) (Oral)   Resp 20   Ht 1.676 m (5' 5.98\")   Wt 71 kg (156 lb 8.4 oz)   SpO2 96%   BMI 25.28 kg/m       GENERAL APPEARANCE: tired, no distress  EYES:  scleral icterus, pupils equal  Pulmonary: lungs clear  CV: tachy   - Edema - no LE edema  GI: large distended, tender. Has retroperitoneal drain  MS: no evidence of inflammation in joints, no muscle tenderness  SKIN: no rash, warm, dry, no cyanosis  NEURO: alert/interactive  ACCESS: Right TDC    Labs:   All labs reviewed by me  Electrolytes/Renal -   Recent Labs   Lab Test 08/08/21  0509 08/07/21  0637 08/06/21  2215 08/06/21  0655   * 126*  --  124*   POTASSIUM 3.9 3.9  --  3.4   CHLORIDE 97 91*  --  90*   CO2 22 22  --  24   BUN 13 27  --  14   CR 2.01* 3.31*  --  2.36*   * 68* 76 86   JANENE 8.0* 8.4*  --  8.3*   MAG 1.5* 1.8  --  1.8   PHOS 2.3* 6.2*  --  3.9       CBC -   Recent Labs   Lab Test 08/08/21  0509 08/07/21  1409 08/07/21  0637   WBC 18.8* 16.8* 17.5*   HGB 8.0* 8.1* 6.9*    267 269       LFTs -   Recent Labs   Lab Test 08/08/21  0509 08/07/21  0637 08/06/21  0655   ALKPHOS 126 113 132   BILITOTAL 2.8* 3.0* 3.4*   ALT 24 20 19   AST 51* 46* 43   PROTTOTAL 7.0 7.0 7.8   ALBUMIN 1.6* 1.7* 2.0*       Iron Panel -   Recent Labs   Lab Test 07/19/21  0632   IRON 31*   IRONSAT 43           Current Medications:    sodium bicarbonate  325 mg Per Feeding Tube Q4H    And     amylase-lipase-protease  1-2 capsule Per Feeding Tube Q4H     amylase-lipase-protease  2 capsule Oral TID w/meals     B and C vitamin Complex with folic acid  5 mL Per Feeding Tube Daily     ceFEPIme (MAXIPIME) IV  1 g Intravenous Q24H     folic acid  1 mg Oral Daily    Or     folic acid  1 mg Intravenous Daily     [Held by provider] heparin ANTICOAGULANT  5,000 Units Subcutaneous Q12H     HYDROmorphone (STANDARD CONC)  2 mg Oral Q8H     [START ON 8/9/2021] " lactulose  20 g Oral or NG Tube Daily     melatonin  6 mg Oral At Bedtime     metroNIDAZOLE  500 mg Intravenous Q12H     pantoprazole  40 mg Oral QAM AC     potassium & sodium phosphates  1 packet Oral TID     rifaximin  550 mg Oral BID     sodium chloride (PF)  100 mL Irrigation Q4H LENKA     sodium chloride (PF)  3 mL Intracatheter Q8H     sodium chloride (PF)  3 mL Intracatheter Q8H     sodium chloride (PF)  73 mL Intravenous Once     sodium phosphate  15 mmol Intravenous Once     vitamin B1  100 mg Oral Daily    Or     thiamine  100 mg Intravenous Daily       - MEDICATION INSTRUCTIONS -       abigail Kelley MD   of Medicine  Department of Nephrology  [facility]

## 2021-08-08 NOTE — PROVIDER NOTIFICATION
Notified cross cover for increase weak coughing .  Breath sound coarse . Patient HR in the 120's . RR in the 20's . On 3 LPM NC . Patient advanced his diet to regular with small bites . Stat CXR,Cough syrup issued.  kept NPO for safety to rule out aspiration .

## 2021-08-08 NOTE — PLAN OF CARE
Major Shift Events:  A&OX4 .SOB with increase weak coughing . Please see the MD notification . Soft BP . Map> 60 .Afebrile. RR in the 20's . Tele ST in the 120's . On 2-3 LPM NC . Breath sound coarse. Dim in the right side . Abdomin distended . Zofran gvien once for nausea . Had two loose BMs . Tolerated TF @60 ml/hr .with free water 60cc/4hrs . L abdominal drain flushed per order . G Tube to gravity . Advance to regular diet with small Bites . Kept NPO for increase coughing . S/p HD . 1 Liter off .   Plan: IR Consulted right.Thoracentesis.continue to monitor patient closely .    For vital signs and complete assessments, please see documentation flowsheets.

## 2021-08-08 NOTE — CONSULTS
"  INTERVENTIONAL RADIOLOGY CONSULT NOTE    Reason for referral:    30M necrotizing pancreatitis with significant R pleural effusion- please evaluate for thoracentesis; Requesting provider?       Requester (Name and pager/phone): See primary      History:   Dax Villareal is a 30 year old male with history of alcohol use disorder, severe acute alcoholic hepatitis complicated by hepatic encephalopathy, ESRD requiring HD, and malnutrition in the setting of acute necrotizing pancreatitis s/p PEG-J placement (7/13). Presented with acute respiratory failure and septic shock. Found to have  persistent right pleural effusion. IR is consulted for consideration of right thoracentesis.    Patient with intermittently increased SOB. 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 He subsequently refused immediate thoracentesis but would consider it tomorrow.       Objective  /53 (BP Location: Right arm)   Pulse 120   Temp 98.3  F (36.8  C) (Axillary)   Resp 18   Ht 1.676 m (5' 5.98\")   Wt 71 kg (156 lb 8.4 oz)   SpO2 96%   BMI 25.28 kg/m      Labs:  Lab Results   Component Value Date    HGB 8.0 08/08/2021    HGB 7.5 07/11/2021     Lab Results   Component Value Date     08/08/2021     07/11/2021     Lab Results   Component Value Date    WBC 18.8 08/08/2021    WBC 25.8 07/11/2021       Lab Results   Component Value Date    INR 1.74 08/08/2021    INR 1.91 07/11/2021       Lab Results   Component Value Date    PROTTOTAL 7.0 08/08/2021    PROTTOTAL 7.5 07/11/2021      Lab Results   Component Value Date    ALBUMIN 1.6 08/08/2021    ALBUMIN 2.8 07/11/2021     Lab Results   Component Value Date    BILITOTAL 2.8 08/08/2021    BILITOTAL 17.3 07/11/2021     No results found for: BILICONJ   Lab Results   Component Value Date    ALKPHOS 126 08/08/2021    ALKPHOS 177 07/11/2021     Lab Results   Component Value Date    AST 51 08/08/2021    AST 79 " 07/11/2021     Lab Results   Component Value Date    ALT 24 08/08/2021    ALT 21 07/11/2021       Lab Results   Component Value Date    CR 2.01 08/08/2021    CR 3.20 07/11/2021     Lab Results   Component Value Date    BUN 13 08/08/2021    BUN 41 07/11/2021       Imaging:   CT AP 8/5/21  1. Stable sequelae of necrotizing pancreatitis with cystogastrostomy stent, percutaneous drain, and multiple necrotic peripancreatic fluid collections as described above.  2. Stable small volume of abdominopelvic ascites. Rim hyperdense right paracolic gutter fluid appears slightly decreased.  3. Large right pleural effusion with associated compressive atelectasis.  4. Continued poor renal parenchymal enhancement, suggesting acute tubular necrosis..    CXR 8/8/21  1. Decreased mixed interstitial and airspace opacities and bilateral pleural effusions, right greater than left.  2. Support devices are unchanged.    Assessment:   Dax Villareal is a 30 year old male with history of alcohol use disorder, severe acute alcoholic hepatitis complicated by hepatic encephalopathy, ESRD requiring HD, and malnutrition in the setting of acute necrotizing pancreatitis s/p PEG-J placement (7/13). Presented with acute respiratory failure and septic shock. Found to have  persistent right pleural effusion. IR is consulted for consideration of right thoracentesis. Patient would be appropriate candidate for thoracentesis and could be considered in the future when he consents. CAPs team could attempt thoracentesis and consult IR in the future if thoracentesis is not possible at point of care.    Plan:   -Please contact IR when patient desires thoracentesis and CAPs team has failed thoracentesis

## 2021-08-08 NOTE — PROVIDER NOTIFICATION
Patient since came from HR in the upper 120's .Prior the HD run during the day HR in the 110's . Patient soft MAP 64-72 . Pt is asymptomatic . no new order . Will monitor closely .

## 2021-08-08 NOTE — PLAN OF CARE
"Neuro: A&Ox4.   Cardiac: -130s /52 (BP Location: Right arm)   Pulse 118   Temp 97.7  F (36.5  C) (Oral)   Resp 20   Ht 1.676 m (5' 5.98\")   Wt 71 kg (156 lb 8.4 oz)   SpO2 96%   BMI 25.28 kg/m           Respiratory: Sating  on 3L NC   GI/: No urine output on HD. BM X2 loose   Diet/appetite:Regular diet not tolerating much, nauseous zofran given twice. G tube to gravity and J tube for tube feeds at 60ml/hr  Activity:  Assist of x2 turning in bed   Pain: Dilaudid for Abdominal pain  Skin: Peeling and cracked feet, redness on bottom cream applied   LDA's: PICC x3 lumens Heparin at 1300units/hr, Biliary drain      Plan: CT of chest done for increased breathing. Continue with POC. Notify primary team with changes.   "

## 2021-08-08 NOTE — PROGRESS NOTES
[facility]    Progress Note - [facility]oon 3 Service        Date of Admission:  6/28/2021    Assessment & Plan   Dax Villareal is a 30 year old male with hx of alcohol use disorder admitted on 6/28/2021 after recent prolonged admission at [facility] in [place] who has severe acute alcoholic hepatitis c/b HE, ESRD requiring HD, and malnutrition in the setting of acute necrotizing pancreatitis, s/p PEG-J placement on 7/13. He arrived with acute respiratory failure and septic shock that have resolved, and his secondary bacterial peritonitis has been appropriately treated. He requires continued management for ESRD, hepatic encephalopathy, infected pancreatic pseudocyst, nutritional support via PEG-J, and persistent right pleural effusion.     Today:  - CT-PE for tachycardia and chest pain. Troponin negative. (Pro-BNP high but hard to assess in ESRD). Read says possible non-obstructing pulmonary embolus. Will clarify with CT and discuss with GI  - Possible aspiration event overnight, previously seen by SLP. Requesting VFSS for 8/9     # Necrotizing alcoholic pancreatitis w/ infected pseudocyst s/p RP Drain and Cystogastrostomy  # Septic Shock, resolved   # Leukocytosis    Admitted to the [facility] ICU on 6/28/2021 from [facility] in [place] for septic shock due to necrotizing pancreatitis. CT on admit showing mature collection with enhancing wall measuring ~[id number]. IR placed perc drain 6/29. Repeat imaging 7/18 revealed walled off necrotic collection amenable to endoscopic transluminal drainage with cystgastrostomy stenting with necrosectomy on 7/21/21.  7/28 - EGD, stent replacement (transluminal Axios with coaxial Solus), PEG-J removal (buried bumper) and replacement of new PEG-J with one new T-tag. Repeat necrosectomy and Axios removal/replacement Friday 8/6     - Continue Cefepime 1 gm Q 24 + flagyl through endoscopic debridement on 8/6 per GI  - Daily CBC, watching  "fever curve  - GI following, appreciate recs              - Pancreatic enzymes   - Flush drain with 100ml q4hr per GI   - Blood cultures and fluid cultures NGTD    # Secondary Bacterial Peritonitis 2/2 infected pseudocyst  # Peripancreatic fluid collection growing VRE   7/2 - Diagnostic paracentesis (WBC 2663 - 74% PMN, SAAG <1.1, protein 4.1)     Antibiotics:   Current: Cefepime 1 gm Q 24 + flagyl 500 q8 (8/6-present)     Past:  - Linezolid 7/21-7/30  - Vancomcyin 6/29  - Meropenem 6/29-7/18  - Micafungin 6/29-7/1  - Cipro 7/19-7/21  - Ceftriaxone 7/21-7/24; 7/26-7/27  - Zosyn 7/27-8/6    - Paracentesis PRN      # Right hepatic/pancreatic hydrothorax  Intermittently increased subjective shortness of breath. CXR on 7/19 w/ increased RLL haziness; ddx includes pleural effusion v pneumonia v atelectasis. CT on 7/28 showed large volume R hepatic hydrothorax with complete atelectasis of right lung, bedside thora completed 7/28 with labs and cultures.     CXR 7/30 showed moderate R pleural effusion, unchanged from previous and improved aeration of right lung with small remaining pleural effusion on CT. Thora cultures negative.     - IR completed thoracentesis 7/28 - exudative  - ID following: agree to continue with empiric abx, though pleural effusion most likely 2/2 hepatic hydrothorax and pancreatitis. Defer plan for repeat thoracentesis based on symptoms and oxygen requirement. No plan for chest tube given concern for infected pleural effusion  - Supplemental oxygen as needed to keep sats above 89%  - Repeat Thoracentesis PRN for dyspnea    #Non-obstructive Left Upper Lobe Pulmonary Embolus  Patient CTA-Chest on 8/8 with \"possible\" non-obstructive PE in KALYAN Pulmonary Artery Branch. Patient has largely been bed bound and not on DVT prophylaxis for several days, especially in setting of recent procedures. Troponin negative x2  - Clarifying with Rads prior to intervention    # Lactic acidosis  Multifactorial d/t " decreased lactic clearance in the setting of acute hepatic failure, ESRD, and acute on chronic infection.  - Continue to trend and assess vitals     # Gastric Outlet Obstruction  High G tube output due to the cystogastric connection/fluid drainage noted on 8/5. Per GI, possibly related to gastric outlet from severe abdominal inflammation and expected. KUB negative for obstructive gas pattern. Ileus from narcotics considered, but output not consistent with tube feeds  -Will trial regular diet with small bites. If vomiting, make NPO  -Anti-emetics  -Will monitor and continue to discuss with GI  -Having fluid removal per nephrology due to HD    #Hypokalemia  High GI output state. Replete as needed with ESRD noted. Improved with decreased tube feeds    # Severe malnutrition in setting of chronic illness  PEG-J placed 7/13. Patient intermittently refusing tube feeds d/t nausea/vomiting.  - Dietitian consult for new tube feeds, appreciate recs   - Continuous TF per dietician order (advancing VERY slowly) with goal of 65 ml/hr              - Cont panc enzymes   - Will trial regular diet with small bites. If vomiting, make NPO  - Regular Diet, thin liquids per SLP  - VFSS for 8/8  - Continue multivitamins  - Pantoprazole 40 mg IV daily  - Zofran PRN     # Alcoholic hepatitis complicated by portal hypertension, ascites, and HE - improving   # Hx of Alcohol use disorder c/b withdrawal   # Anemia  # Coagulopathy   Patient with a history of marked alcohol use and alcoholic hepatitis in the past. Was given a course of steroids (1 week) during previous admission at OSH. Patient mental status has been stable, alert and oriented x4 after rifaxamin and aggressive lactulose. Not a liver transplant candidate.      - Lactulose 20g QID and PRN and rifaximin 550mg BID - as patient allows  - Trend MELD labs  - Daily folic acid and thiamine  - Delirium precautions  - Melatonin 6mg at bedtime  - Daily CBC   - Transfuse if hemoglobin < 7,  platelet < 10 or < 30 with bleeding  - Continue to monitor for signs of bleeding      # Stage III RADHA on HD due to ATN  # ESRD due to ATN  # Hypervolemia causing acute respiratory failure, resolved  Nephrology note 7/21 indicates that patient has been on renal replacement therapy for two months and pt status has most likely transitioned from ARF to ESRD at this point.          > Hemodialysis as per renal reccs              - Midodrine 10 mg PRN before dialysis sessions     > Daily chemistry labs, strict I/O, and daily weights  - Aspiration precautions  - Supplemental oxygen as needed to keep sats above 89%    #Anemia  Baseline hgb 17. Running between 6-8 in setting of chronic illness, new ESRD, frequent lab draws and procedures.  - Goal hgb >7.0, trend Hgb    # Intermittent lactic acidosis  Suspect due to poor lactate clearance due to ESRD and ESLD + ongoing inflammation/infection. No fluids given due to patient being on dialysis    # Apathy, concern for depression  Patient is intermittently refusing cares due to wanting a 'break,' but also reaffirms that he wants everything done to prolong his life. Mood is depressed, apathetic at times. Patient has multiple stressors including his own medical conditions and family health issues.  - Continue to offer emotional support services such as psychiatry, palliative,   - He declined psychiatry/psychology visits     # Goals of Care  Please see additional interval note for in depth details regarding conversation on 7/9 about goals of care. Patient's long term prognosis is still quite poor given his multi organ failure. Care conference on 7/15, pt did not wish to participate. Met with sisters Noni (on phone) and Melyssa and close friend Leroy with palliative, nephrology and general medicine teams to discuss Dax's current improving status, poor prognosis and wishes to continue full cares. *   - Hepatology confirmed pt will not be a transplant candidate  - Daily phone  updates to sister Noni ([phone])       Diet: Adult Formula Drip Feeding: Continuous Vital 1.5; Jejunostomy; Goal Rate: 60; mL/hr; Medication - Feeding Tube Flush Frequency: At least 15-30 mL water before and after medication administration and with tube clogging; Amount to Send (Nutrition us...  Regular Diet Adult    DVT Prophylaxis: Pneumatic Compression Devices  Rdz Catheter: Not present  Fluids: 10 mL NS TKO  Central Lines: PRESENT  PICC Triple Lumen 06/28/21 Left-Site Assessment: WDL  CVC Double Lumen Right-Site Assessment: WDL  Code Status: Full Code      Disposition Plan   Expected discharge: >7 days, recommended to transitional care unit once  nutrition improved, infected pancreatic pseudocyst is well managed and plan for dialysis is in place.       Johny Ortiz MD  [phone]    [facility]  Securely message with the Vocera Web Console (learn more here)  Text page via [employer] Paging/Directory  Please see sign in/sign out for up to date coverage information  _____________________________________________________________________    Interval History   Ate half a burrito last night. Vomiting this AM, small volume & bilious. Worsening cough.    Data reviewed today: I reviewed all medications, new labs and imaging results over the last 24 hours.    Physical Exam   Vital Signs: Temp: 98.6  F (37  C) Temp src: Axillary BP: 109/41 Pulse: (!) 122   Resp: 24 SpO2: 92 % O2 Device: Nasal cannula Oxygen Delivery: 3 LPM  Weight: 156 lbs 8.43 oz  Constitutional: Awake, non-diaphoretic Ox3  HEENT: Normocephalic. Atraumatic. No scleral icterus   Cardiovascular: tachycardic, tender chest to palpation diffusely  Respiratory: slight tachypnea, decreased breath sounds in right lower lung and rhonchi in right upper lobe  Musculoskeletal: Emaciated, Hammer toe deformity  Skin: resolving jaundice    Data   Recent Labs   Lab 08/08/21  0509 08/07/21  1409 08/07/21  0637  08/06/21  2215 08/06/21  1002 08/06/21  0655 08/02/21  0603 08/01/21  1414   WBC 18.8* 16.8* 17.5*  --   --  24.0*  --  14.0*   HGB 8.0* 8.1* 6.9*  --   --  8.1*  --  8.1*   MCV 89 89 91  --   --  91  --  93    267 269  --   --  316  --  98*   INR 1.74*  --   --   --  1.68*  --   --  1.69*   *  --  126*  --   --  124*  --  134   POTASSIUM 3.9  --  3.9  --   --  3.4  --  3.0*   CHLORIDE 97  --  91*  --   --  90*  --  97   CO2 22  --  22  --   --  24  --  28   BUN 13  --  27  --   --  14  --  22   CR 2.01*  --  3.31*  --   --  2.36*  --  2.68*   ANIONGAP 10  --  13  --   --  10  --  9   JANENE 8.0*  --  8.4*  --   --  8.3*  --  8.2*   *  --  68* 76  --  86   < > 131*   ALBUMIN 1.6*  --  1.7*  --   --  2.0*  --  2.0*   PROTTOTAL 7.0  --  7.0  --   --  7.8  --  7.3   BILITOTAL 2.8*  --  3.0*  --   --  3.4*  --  4.2*   ALKPHOS 126  --  113  --   --  132  --  122   ALT 24  --  20  --   --  19  --  15   AST 51*  --  46*  --   --  43  --  28   TROPONIN <0.015  --   --   --   --   --   --   --     < > = values in this interval not displayed.     Recent Results (from the past 24 hour(s))   XR Chest Port 1 View    Narrative    EXAM: XR CHEST PORT 1 VIEW  8/8/2021 4:48 AM     HISTORY:  30M new cough, stable oxygen requirement, 8/2 imaging with  possible infection, on antibiotics       COMPARISON:  Chest x-ray 8/2/2021    FINDINGS: AP radiograph of the chest. Left arm PICC with tip overlying  the low SVC. Right IJ dual-lumen central venous catheter tip in  overlying the lower SVC.    Stable cardiomegaly mediastinal silhouette. Slightly decreased  moderate layering right pleural effusion. Small left pleural effusion.  No pneumothorax. Decreased mixed interstitial and patchy airspace  opacities. Partial visualization of surgical drain in the left upper  quadrant. The visualized upper abdomen is otherwise unremarkable.      Impression    IMPRESSION:  1. Decreased mixed interstitial and airspace opacities and  bilateral  pleural effusions, right greater than left.  2. Support devices are unchanged.    I have personally reviewed the examination and initial interpretation  and I agree with the findings.    CHELLE STARKS MD         SYSTEM ID:  [id number]   CT Chest Pulmonary Embolism w Contrast    Narrative    Chest CT pulmonary angiogram with contrast    INDICATION: Pulmonary embolus suspected, high probability    COMPARISON: No prior chest CT scans.    Contrast: 57 mL intravenous [id number]    FINDINGS: Extensive opacification of the right hemithorax with large  pleural effusion and associated atelectasis. No left lung nodule. No  groundglass opacities.  The included thyroid appears without discrete nodule. There are  nonenlarged axillary and mediastinal lymph nodes. Upper limits normal  sized right pretracheal node (series 9 image 102), for example,  measures 1.3 x 0.7 cm. Multiple nonenlarged aortopulmonary window  lymph nodes are present. Opacification of the pulmonary artery tree is  somewhat suboptimal, however, there is a suitable hypodense filling  defect suggested in the left upper lobe branches (series 9 image 94)  with contrast mixing versus subtle vague hypodensity in the left lower  lobar pulmonary artery (series 9 image 110). No evidence of right  heart enlargement. No saddle embolus. Streak artifact from the single  right-sided SVC. Catheter tip at the cavoatrial junction. Main  pulmonary artery normal in size.  Upper abdomen shows tubing within the stomach from an anterior  percutaneous approach which may represent a GJ tube which is  incompletely imaged. Incompletely imaged rounded fluid density noted  in the left upper abdomen anteriorly (series 9 image 320) which is  grossly unchanged from recent CT cyst with abscess. Haziness adjacent  to the pancreas from necrotizing pancreatitis again is partially  imaged.  Bones appear well mineralized.      Impression    IMPRESSION: Suboptimal opacification of  pulmonary tree but there is  concern for possible nonobstructing embolus in a left upper lobe  pulmonary artery branch. No evidence of right heart strain. Large  right pleural effusion with associated atelectasis. Multiple fluid  collections in the abdomen which may represent abscesses from no  necrotizing pancreatitis. Multiple nonenlarged mediastinal lymph  nodes, possibly reactive. No pulmonary nodule.    CHELLE STARKS MD         SYSTEM ID:  [id number]

## 2021-08-09 ENCOUNTER — APPOINTMENT (OUTPATIENT)
Dept: GENERAL RADIOLOGY | Facility: CLINIC | Age: 31
End: 2021-08-09
Attending: INTERNAL MEDICINE
Payer: MEDICAID

## 2021-08-09 ENCOUNTER — APPOINTMENT (OUTPATIENT)
Dept: ULTRASOUND IMAGING | Facility: CLINIC | Age: 31
End: 2021-08-09
Attending: INTERNAL MEDICINE
Payer: MEDICAID

## 2021-08-09 LAB
ABO/RH(D): NORMAL
ACANTHOCYTES BLD QL SMEAR: SLIGHT
ALBUMIN SERPL-MCNC: 1.6 G/DL (ref 3.4–5)
ALP SERPL-CCNC: 121 U/L (ref 40–150)
ALT SERPL W P-5'-P-CCNC: 22 U/L (ref 0–70)
AMYLASE FLD-CCNC: 19 U/L
ANION GAP SERPL CALCULATED.3IONS-SCNC: 9 MMOL/L (ref 3–14)
ANTIBODY SCREEN: NEGATIVE
AST SERPL W P-5'-P-CCNC: 41 U/L (ref 0–45)
BASOPHILS # BLD MANUAL: 0.2 10E3/UL (ref 0–0.2)
BASOPHILS NFR BLD MANUAL: 1 %
BILIRUB SERPL-MCNC: 2.5 MG/DL (ref 0.2–1.3)
BLISTER CELLS BLD QL SMEAR: SLIGHT
BUN SERPL-MCNC: 19 MG/DL (ref 7–30)
CALCIUM SERPL-MCNC: 7.8 MG/DL (ref 8.5–10.1)
CHLORIDE BLD-SCNC: 93 MMOL/L (ref 94–109)
CO2 SERPL-SCNC: 24 MMOL/L (ref 20–32)
CREAT SERPL-MCNC: 2.81 MG/DL (ref 0.66–1.25)
EOSINOPHIL # BLD MANUAL: 0.2 10E3/UL (ref 0–0.7)
EOSINOPHIL NFR BLD MANUAL: 1 %
ERYTHROCYTE [DISTWIDTH] IN BLOOD BY AUTOMATED COUNT: 16.2 % (ref 10–15)
ERYTHROCYTE [DISTWIDTH] IN BLOOD BY AUTOMATED COUNT: 16.4 % (ref 10–15)
GFR SERPL CREATININE-BSD FRML MDRD: 29 ML/MIN/1.73M2
GLUCOSE BLD-MCNC: 139 MG/DL (ref 70–99)
HCT VFR BLD AUTO: 23.8 % (ref 40–53)
HCT VFR BLD AUTO: 24.8 % (ref 40–53)
HGB BLD-MCNC: 8 G/DL (ref 13.3–17.7)
HGB BLD-MCNC: 8.3 G/DL (ref 13.3–17.7)
LACTATE SERPL-SCNC: 1.5 MMOL/L (ref 0.7–2)
LDH FLD L TO P-CCNC: 149 U/L
LDH SERPL L TO P-CCNC: 191 U/L (ref 85–227)
LYMPHOCYTES # BLD MANUAL: 0.6 10E3/UL (ref 0.8–5.3)
LYMPHOCYTES NFR BLD MANUAL: 4 %
MAGNESIUM SERPL-MCNC: 2.3 MG/DL (ref 1.6–2.3)
MCH RBC QN AUTO: 29.6 PG (ref 26.5–33)
MCH RBC QN AUTO: 30.1 PG (ref 26.5–33)
MCHC RBC AUTO-ENTMCNC: 33.5 G/DL (ref 31.5–36.5)
MCHC RBC AUTO-ENTMCNC: 33.6 G/DL (ref 31.5–36.5)
MCV RBC AUTO: 88 FL (ref 78–100)
MCV RBC AUTO: 90 FL (ref 78–100)
MONOCYTES # BLD MANUAL: 1.8 10E3/UL (ref 0–1.3)
MONOCYTES NFR BLD MANUAL: 12 %
MYELOCYTES # BLD MANUAL: 0.3 10E3/UL
MYELOCYTES NFR BLD MANUAL: 2 %
NEUTROPHILS # BLD MANUAL: 11.6 10E3/UL (ref 1.6–8.3)
NEUTROPHILS NFR BLD MANUAL: 77 %
PH FLD: 8 PH
PHOSPHATE SERPL-MCNC: 5.2 MG/DL (ref 2.5–4.5)
PLAT MORPH BLD: ABNORMAL
PLATELET # BLD AUTO: 275 10E3/UL (ref 150–450)
PLATELET # BLD AUTO: 290 10E3/UL (ref 150–450)
POTASSIUM BLD-SCNC: 3.6 MMOL/L (ref 3.4–5.3)
PROMYELOCYTES # BLD MANUAL: 0.5 10E3/UL
PROMYELOCYTES NFR BLD MANUAL: 3 %
PROT SERPL-MCNC: 7.1 G/DL (ref 6.8–8.8)
RBC # BLD AUTO: 2.7 10E6/UL (ref 4.4–5.9)
RBC # BLD AUTO: 2.76 10E6/UL (ref 4.4–5.9)
RBC MORPH BLD: ABNORMAL
SODIUM SERPL-SCNC: 126 MMOL/L (ref 133–144)
SPECIMEN EXPIRATION DATE: NORMAL
UPPER GI ENDOSCOPY: NORMAL
WBC # BLD AUTO: 15.1 10E3/UL (ref 4–11)
WBC # BLD AUTO: 17.4 10E3/UL (ref 4–11)

## 2021-08-09 PROCEDURE — 250N000009 HC RX 250: Performed by: INTERNAL MEDICINE

## 2021-08-09 PROCEDURE — 71045 X-RAY EXAM CHEST 1 VIEW: CPT | Mod: 26 | Performed by: RADIOLOGY

## 2021-08-09 PROCEDURE — 85027 COMPLETE CBC AUTOMATED: CPT

## 2021-08-09 PROCEDURE — 250N000011 HC RX IP 250 OP 636: Performed by: STUDENT IN AN ORGANIZED HEALTH CARE EDUCATION/TRAINING PROGRAM

## 2021-08-09 PROCEDURE — 87075 CULTR BACTERIA EXCEPT BLOOD: CPT | Performed by: INTERNAL MEDICINE

## 2021-08-09 PROCEDURE — 83735 ASSAY OF MAGNESIUM: CPT | Performed by: INTERNAL MEDICINE

## 2021-08-09 PROCEDURE — 36592 COLLECT BLOOD FROM PICC: CPT | Performed by: INTERNAL MEDICINE

## 2021-08-09 PROCEDURE — 93970 EXTREMITY STUDY: CPT | Mod: XS

## 2021-08-09 PROCEDURE — 85027 COMPLETE CBC AUTOMATED: CPT | Performed by: INTERNAL MEDICINE

## 2021-08-09 PROCEDURE — 82150 ASSAY OF AMYLASE: CPT | Performed by: STUDENT IN AN ORGANIZED HEALTH CARE EDUCATION/TRAINING PROGRAM

## 2021-08-09 PROCEDURE — 83615 LACTATE (LD) (LDH) ENZYME: CPT | Performed by: STUDENT IN AN ORGANIZED HEALTH CARE EDUCATION/TRAINING PROGRAM

## 2021-08-09 PROCEDURE — 250N000011 HC RX IP 250 OP 636

## 2021-08-09 PROCEDURE — 250N000011 HC RX IP 250 OP 636: Performed by: INTERNAL MEDICINE

## 2021-08-09 PROCEDURE — 36592 COLLECT BLOOD FROM PICC: CPT

## 2021-08-09 PROCEDURE — 120N000003 HC R&B IMCU UMMC

## 2021-08-09 PROCEDURE — 250N000013 HC RX MED GY IP 250 OP 250 PS 637: Performed by: INTERNAL MEDICINE

## 2021-08-09 PROCEDURE — C9113 INJ PANTOPRAZOLE SODIUM, VIA: HCPCS

## 2021-08-09 PROCEDURE — 89051 BODY FLUID CELL COUNT: CPT | Performed by: STUDENT IN AN ORGANIZED HEALTH CARE EDUCATION/TRAINING PROGRAM

## 2021-08-09 PROCEDURE — 84100 ASSAY OF PHOSPHORUS: CPT | Performed by: INTERNAL MEDICINE

## 2021-08-09 PROCEDURE — 99233 SBSQ HOSP IP/OBS HIGH 50: CPT | Performed by: CLINICAL NURSE SPECIALIST

## 2021-08-09 PROCEDURE — 999N000065 XR CHEST PORT 1 VIEW

## 2021-08-09 PROCEDURE — 93970 EXTREMITY STUDY: CPT | Mod: 26 | Performed by: RADIOLOGY

## 2021-08-09 PROCEDURE — 80053 COMPREHEN METABOLIC PANEL: CPT | Performed by: INTERNAL MEDICINE

## 2021-08-09 PROCEDURE — 99233 SBSQ HOSP IP/OBS HIGH 50: CPT | Performed by: PHYSICIAN ASSISTANT

## 2021-08-09 PROCEDURE — 99233 SBSQ HOSP IP/OBS HIGH 50: CPT | Mod: GC | Performed by: INTERNAL MEDICINE

## 2021-08-09 PROCEDURE — 93970 EXTREMITY STUDY: CPT

## 2021-08-09 PROCEDURE — 32555 ASPIRATE PLEURA W/ IMAGING: CPT | Performed by: INTERNAL MEDICINE

## 2021-08-09 PROCEDURE — 99221 1ST HOSP IP/OBS SF/LOW 40: CPT | Mod: GC | Performed by: INTERNAL MEDICINE

## 2021-08-09 PROCEDURE — 83986 ASSAY PH BODY FLUID NOS: CPT | Performed by: STUDENT IN AN ORGANIZED HEALTH CARE EDUCATION/TRAINING PROGRAM

## 2021-08-09 PROCEDURE — 83605 ASSAY OF LACTIC ACID: CPT | Performed by: INTERNAL MEDICINE

## 2021-08-09 PROCEDURE — 87070 CULTURE OTHR SPECIMN AEROBIC: CPT | Performed by: STUDENT IN AN ORGANIZED HEALTH CARE EDUCATION/TRAINING PROGRAM

## 2021-08-09 PROCEDURE — 84157 ASSAY OF PROTEIN OTHER: CPT | Performed by: STUDENT IN AN ORGANIZED HEALTH CARE EDUCATION/TRAINING PROGRAM

## 2021-08-09 RX ORDER — MAGNESIUM HYDROXIDE 1200 MG/15ML
LIQUID ORAL
Status: DISCONTINUED
Start: 2021-08-09 | End: 2021-08-09 | Stop reason: HOSPADM

## 2021-08-09 RX ORDER — LORAZEPAM 2 MG/ML
.5-1 INJECTION INTRAMUSCULAR
Status: COMPLETED | OUTPATIENT
Start: 2021-08-09 | End: 2021-08-09

## 2021-08-09 RX ORDER — HYDROMORPHONE HYDROCHLORIDE 1 MG/ML
.3-.5 INJECTION, SOLUTION INTRAMUSCULAR; INTRAVENOUS; SUBCUTANEOUS
Status: COMPLETED | OUTPATIENT
Start: 2021-08-09 | End: 2021-08-09

## 2021-08-09 RX ADMIN — HYDROXYZINE HYDROCHLORIDE 50 MG: 25 TABLET, FILM COATED ORAL at 21:42

## 2021-08-09 RX ADMIN — ONDANSETRON 4 MG: 2 INJECTION INTRAMUSCULAR; INTRAVENOUS at 18:23

## 2021-08-09 RX ADMIN — SODIUM BICARBONATE 325 MG: 325 TABLET ORAL at 21:32

## 2021-08-09 RX ADMIN — HYDROMORPHONE HYDROCHLORIDE 2 MG: 1 SOLUTION ORAL at 23:23

## 2021-08-09 RX ADMIN — SODIUM BICARBONATE 325 MG: 325 TABLET ORAL at 23:32

## 2021-08-09 RX ADMIN — Medication 550 MG: at 09:22

## 2021-08-09 RX ADMIN — METRONIDAZOLE 500 MG: 500 INJECTION, SOLUTION INTRAVENOUS at 18:16

## 2021-08-09 RX ADMIN — PANCRELIPASE 2 CAPSULE: 24000; 76000; 120000 CAPSULE, DELAYED RELEASE PELLETS ORAL at 15:16

## 2021-08-09 RX ADMIN — Medication 550 MG: at 21:30

## 2021-08-09 RX ADMIN — HYDROMORPHONE HYDROCHLORIDE 1 MG: 1 SOLUTION ORAL at 03:59

## 2021-08-09 RX ADMIN — THIAMINE HCL TAB 100 MG 100 MG: 100 TAB at 09:26

## 2021-08-09 RX ADMIN — FOLIC ACID 1 MG: 1 TABLET ORAL at 09:21

## 2021-08-09 RX ADMIN — METRONIDAZOLE 500 MG: 500 INJECTION, SOLUTION INTRAVENOUS at 06:49

## 2021-08-09 RX ADMIN — SODIUM BICARBONATE 325 MG: 325 TABLET ORAL at 13:25

## 2021-08-09 RX ADMIN — Medication 5 ML: at 09:23

## 2021-08-09 RX ADMIN — PANCRELIPASE 2 CAPSULE: 24000; 76000; 120000 CAPSULE, DELAYED RELEASE PELLETS ORAL at 13:26

## 2021-08-09 RX ADMIN — HYDROMORPHONE HYDROCHLORIDE 0.5 MG: 1 INJECTION, SOLUTION INTRAMUSCULAR; INTRAVENOUS; SUBCUTANEOUS at 11:48

## 2021-08-09 RX ADMIN — PANCRELIPASE 2 CAPSULE: 24000; 76000; 120000 CAPSULE, DELAYED RELEASE PELLETS ORAL at 21:32

## 2021-08-09 RX ADMIN — CEFEPIME HYDROCHLORIDE 1 G: 1 INJECTION, POWDER, FOR SOLUTION INTRAMUSCULAR; INTRAVENOUS at 21:25

## 2021-08-09 RX ADMIN — PANTOPRAZOLE SODIUM 40 MG: 40 INJECTION, POWDER, FOR SOLUTION INTRAVENOUS at 09:27

## 2021-08-09 RX ADMIN — PANTOPRAZOLE SODIUM 40 MG: 40 INJECTION, POWDER, FOR SOLUTION INTRAVENOUS at 21:29

## 2021-08-09 RX ADMIN — SODIUM BICARBONATE 325 MG: 325 TABLET ORAL at 03:34

## 2021-08-09 RX ADMIN — SODIUM BICARBONATE 325 MG: 325 TABLET ORAL at 17:17

## 2021-08-09 RX ADMIN — HYDROMORPHONE HYDROCHLORIDE 2 MG: 1 SOLUTION ORAL at 00:03

## 2021-08-09 RX ADMIN — LORAZEPAM 0.5 MG: 2 INJECTION INTRAMUSCULAR; INTRAVENOUS at 11:49

## 2021-08-09 RX ADMIN — ONDANSETRON 4 MG: 2 INJECTION INTRAMUSCULAR; INTRAVENOUS at 23:44

## 2021-08-09 RX ADMIN — MELATONIN TAB 3 MG 6 MG: 3 TAB at 03:33

## 2021-08-09 RX ADMIN — PANCRELIPASE 2 CAPSULE: 24000; 76000; 120000 CAPSULE, DELAYED RELEASE PELLETS ORAL at 03:34

## 2021-08-09 RX ADMIN — HYDROMORPHONE HYDROCHLORIDE 1 MG: 1 SOLUTION ORAL at 18:17

## 2021-08-09 RX ADMIN — HYDROMORPHONE HYDROCHLORIDE 2 MG: 1 SOLUTION ORAL at 15:08

## 2021-08-09 RX ADMIN — HYDROMORPHONE HYDROCHLORIDE 2 MG: 1 SOLUTION ORAL at 06:51

## 2021-08-09 RX ADMIN — HYDROXYZINE HYDROCHLORIDE 50 MG: 25 TABLET, FILM COATED ORAL at 04:03

## 2021-08-09 RX ADMIN — PANCRELIPASE 2 CAPSULE: 24000; 76000; 120000 CAPSULE, DELAYED RELEASE PELLETS ORAL at 17:24

## 2021-08-09 RX ADMIN — PANCRELIPASE 2 CAPSULE: 24000; 76000; 120000 CAPSULE, DELAYED RELEASE PELLETS ORAL at 23:32

## 2021-08-09 RX ADMIN — HYDROXYZINE HYDROCHLORIDE 50 MG: 25 TABLET, FILM COATED ORAL at 13:53

## 2021-08-09 ASSESSMENT — MIFFLIN-ST. JEOR: SCORE: 1652.5

## 2021-08-09 ASSESSMENT — ACTIVITIES OF DAILY LIVING (ADL)
ADLS_ACUITY_SCORE: 18

## 2021-08-09 NOTE — CONSULTS
"Hematology Consult    Date of Service 08/09/2021  Admit Date 6/28/2021   Initial Consult 08/09/21  Hospital Day: 43     Reason for consult: pulmonary embolism, GI bleed  Consult requested by: Dr. Morrow and team    History of Present Illness  Dax Villareal is a 30 year old man with a history of alcohol use disorder who was admitted 6/28/2021 after prolonged admission at [facility] for severe acute alcoholic hepatitis, ESRD on HD, acute necrotizing pancreatitis, acute respiratory failure and septic shock and secondary bacterial peritonitis, most have which have resolved. He has needed PEG-J placement for nutritional support, and continued management of hepatic encephalopathy. He had tachycardia and chest pain, with negative troponin and high BNP in the setting of ESRD. CT PE yesterday shows a possible nonobstructing embolus in the left upper lobe pulmonary artery branch with no evidence of right heart strain. He also has a large R sided pleural effusion. He was started on heparin yesterday but developed at least 3 bloody bowel movements, most recently overnight. He has been off the heparin and has not had any further bleeding. Currently, he denies any epistaxis, mouth bleeding, or other issues with excessive bleeding or bruising.     We are asked to comment on anticoagulation recommendations.     PMH: As above  PSH: As above  FamHx: reviewed and noncontributory  SocHx: chronic alcohol   Meds reviewed in Epic.  Allergies reviewed in Epic    Physical Exam  /64 (BP Location: Right arm)   Pulse 120   Temp 98.3  F (36.8  C) (Oral)   Resp 20   Ht 1.676 m (5' 5.98\")   Wt 75.5 kg (166 lb 7.2 oz)   SpO2 99%   BMI 26.88 kg/m       Constitutional: Awake, alert, cooperative, thin  Eyes: PERRL, EOMI, sclera clear, conjunctiva normal, hoarse voice  ENT: Normocephalic, without obvious abnormality, oral pharynx with moist mucus membranes  Respiratory: Non-labored breathing, good air exchange, decreased on the " right  Cardiovascular: RRR, no murmur noted.  GI: + bowel sounds, soft, slightly distended, non-tender, no masses palpated  Skin: No concerning lesions or rash on exposed areas. No significant bruising.  Musculoskeletal: No edema luisito LEs.  Neurologic: Awake, alert & oriented x3.  Cranial nerves II-XII are grossly intact.   Psych: appropriate affect      Recent Labs   Lab 08/09/21  0326   POTASSIUM 3.6   CR 2.81*   JANENE 7.8*   MAG 2.3   PHOS 5.2*        Recent Labs   Lab 08/09/21  0326 08/08/21  0509 08/07/21  0637 08/06/21  0655   AST 41 51* 46* 43   ALT 22 24 20 19   ALKPHOS 121 126 113 132   BILITOTAL 2.5* 2.8* 3.0* 3.4*       Recent Labs   Lab 08/09/21  0326 08/08/21  2241 08/08/21  0509 08/02/21  1720   WBC 17.4* 18.9* 18.8* 15.6*   HGB 8.0* 8.0*  8.0* 8.0* 8.1*   HCT 23.8* 23.7* 24.0* 25.0*   MCV 88 87 89 92    267 264 125*   ALYM  --   --   --  0.8     Recent Labs   Lab 08/02/21  1720   RETICABSCT 0.030   RETP 1.1     Impression:  1. Possible pulmonary embolism on CT 8/8/21, unclear time course, but likely provoked in the setting of hospitalization and ongoing illness.  2. Gastrointestinal tract bleed on heparin.    Recommendations:  1. Agree with checking ultrasound of the bilateral lower extremities, which is negative for DVT.  2. Agree with holding the anticoagulation while working up the GI bleeding  3. No indication for IVC filter at this time especially with no lower extremity DVTs  4. PCDs  5. Check PTT and fibrinogen  6. When stable x 1 week start prophylactic dose anticoagulation and monitor for bleeding, likely will need 3 months of anticoagulation, exact agent depending on degree of organ recovery when getting ready for discharge.    Thank you for allowing us to participate in this patient's care. Please do not hesitate to contact us if there are any further questions or concerns.     Discussed with staff Dr. Beltran.    Anuel Bridges MD  Hematology/Oncology Fellow

## 2021-08-09 NOTE — PROVIDER NOTIFICATION
Provider FYI paged: pt had second bloody BM, this time straight bright red blood. MAP 66 (goal 60). Also feeling more SOB, O2 increased to 4L NC per pt request.

## 2021-08-09 NOTE — PLAN OF CARE
Neuro: A&Ox4. Pt refused AM lactulose.  Cardiac: ST. HR 110s to 120s. VSS.   Respiratory: Sating >95 on NC 3-4 lpm. Tachypneic at times with RR in 20s.  GI/: Anuric this shift. BM X1 no blood in stool.  Diet/appetite: Tolerating R die and tube feeds 60 ml/hr.  Activity:  Assist of 2 with lift.   Pain: At acceptable level on current regimen.   Skin: No new deficits noted.  LDA's: Triple lumen PICC. GJ tube. G to gravity. Tube feeds via J tubes. Abd drain irrigated per mar. HD line.     Plan: R thoracentesis with 2 L was removed. Plan is for possible paracentesis tomorrow as well as HD and video swallow studies.  Continue with POC. Notify primary team with changes.

## 2021-08-09 NOTE — CONSULTS
Procedure Note    Attending: Dr. Stevens  Resident: Zayda Segura  Procedure:Right Thoracentesis  Indication: dyspnea, pleural effusion  Risk Assessment: low  Pre-procedure diagnosis: pleural effusion  Post-procedure diagnosis: pleural effusion     The risks and benefits of the procedure were explained to Dax who expressed understanding and opted to proceed.  Consent was obtained and placed in the chart and the patient was placed on pulse oximetry.  A time out was performed.    An ultrasound probe was used to identify an area of pleural fluid in the rt hemithorax.  This area was prepped and draped in the usual sterile fashion.  4 ml of 1% lidocaine was instilled and fluid located using ultrasound guidance.  A small incision was  made with an 11 blade.  The thoracentesis catheter and needle were inserted above the rib until fluid obtained then the needle removed.    Using a one-way valve, 2000 ml of dark yellow colored fluid was removed. A specimen was sent for analysis.    The catheter was removed with the patient exhaling and an occlusive dressing placed.       Ultrasound revealed normal lung sliding after the procedure and a chest radiograph is pending.    The tolerated the procedure well.  Please contact the Consult and Procedure Service if any concerns or complications arise.       Ninfa Silva MD     DOS:  8/9/2021

## 2021-08-09 NOTE — CONSULTS
IR consult placed for thoracentesis. Patient has a very large effusion that has previously been drained by CAPS service.  Patient is requesting sedation for the procedure. IR will not offer sedation for a thoracentesis.     Please place Internal Medicine Procedure Team Adult IP [place] consult. For urgent procedures between 8am-4pm, 7 days/week use AMCOM to page Staff Procedure Service Medicine CAPS on Medicine/Gold/[facility] On Call list.    Raegan Guillen PA-C  Interventional Radiology   IR on-call pager: [phone]

## 2021-08-09 NOTE — PLAN OF CARE
Neuro: A&Ox4. Able to make needs known.   Cardiac: -120. Soft Bps, MAPs maintained over 60.   Respiratory:  Maintaining adequate O2 sats via NC at 4 LPM/NC.   GI/: voiding poorly, on HD. Loose, Bloody BMs x 3. Emesis x 2.    Diet/appetite:  On NPO. Tube feed stopped at 4AM.   Activity:  Assist of 2 in turning/repositioning in bed.   Pain: Scheduled and PRN Dilaudid utilized for back and abdominal pain with pain relief noted.  Skin: Peeling and cracked feet. Redness in bottom continues, area cleansed and patted dry.   LDA's: PICC x 3 Lumens all SL. CVC for HD.    Plan: For Thoracentesis today. Nursing will continue to follow the POC and update the MD team with concerns.

## 2021-08-09 NOTE — PROGRESS NOTES
Nephrology Progress Note  08/09/2021           Dax Villareal is a 30 year old male with h/o ETOH hepatitis, end stage liver disease, RADHA on HD, transferred from [facility] in [place] for septic shock on 6/28/21 for treatment of necrotizing pancreatitis and decompensated liver disease. He was initially admitted to [facility] 5/19/21.        Interval History :   Mr Villareal was being prepared for thoracentesis today, has had before which likely is due to ascites fluid.  Holding on run today, plan for tomorrow on TTS schedule, no signs of recovery.         Assessment & Recommendations:   RADHA-Cr was 0.8 as recently as 5/19/2021 but now HD dependent, started RRT at [facility] prior to transfer to [facility] (exact date unclear but per family it was end of May).  Etiology likely multifactorial with contrast, sepsis/pancreatitis, likely HRS physiology contributing as well.  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  Continuing RRT as long as it is tolerated with regards to BP's, is functional enough to possibly do outpt HD.  MELD is in high 30's so has high 3 month mortality but continuing restorative cares for now.  Had GI stenting of pancreas x3.                 -Line is TDC from PTA               - HD on TTS schedule unless issues arise.      Volume status-Anuric, net positive yesterday although has significant  output with NG and pancreatic drains.       Electrolytes/pH-K normal and Na low at 126, on fluid restriction for hyponatremia.      Ca/phos/pth-Ca 7.8, corrects to normal with low albumin.  Mg and Phos reasonable.       Anemia-Hgb low, acute management per team, last PRBC's 7/14.  Started on EPO 5k with runs, iron sats 43%      Nutrition-Taking PO, appetite reasonable.       Seen and discussed with Dr Dow      Recommendations were communicated to primary team via note       Tor Larsen, SOFIA CNS  Clinical Nurse Specialist  [phone]    Review of Systems:   I reviewed the following systems:  Gen: No  "fevers or chills  CV: No CP at rest  Resp: No SOB at rest  GI: No N/V    Physical Exam:   I/O last 3 completed shifts:  In: 4120 [P.O.:1080; I.V.:560; Other:500; NG/GT:660]  Out: 2985 [Urine:60; Emesis/NG output:1235; Drains:1690]   /64 (BP Location: Right arm)   Pulse 120   Temp 98.3  F (36.8  C) (Oral)   Resp 20   Ht 1.676 m (5' 5.98\")   Wt 75.5 kg (166 lb 7.2 oz)   SpO2 97%   BMI 26.88 kg/m          GENERAL APPEARANCE: Sitting at edge of bed, in no distress.   EYES:  scleral icterus, pupils equal  Pulmonary: lungs clear  CV: tachy   - Edema - no LE edema  GI: large distended, tender. Has retroperitoneal drain  MS: no evidence of inflammation in joints, no muscle tenderness  SKIN: no rash, warm, dry, no cyanosis  NEURO: alert/interactive  ACCESS: Right TDC    Labs:   All labs reviewed by me  Electrolytes/Renal - Recent Labs   Lab Test 08/09/21  0326 08/08/21  0509 08/07/21  0637   * 129* 126*   POTASSIUM 3.6 3.9 3.9   CHLORIDE 93* 97 91*   CO2 24 22 22   BUN 19 13 27   CR 2.81* 2.01* 3.31*   * 128* 68*   JANENE 7.8* 8.0* 8.4*   MAG 2.3 1.5* 1.8   PHOS 5.2* 2.3* 6.2*       CBC -   Recent Labs   Lab Test 08/09/21  1312 08/09/21  0326 08/08/21  2241   WBC 15.1* 17.4* 18.9*   HGB 8.3* 8.0* 8.0*  8.0*    275 267       LFTs -   Recent Labs   Lab Test 08/09/21  0326 08/08/21  0509 08/07/21  0637   ALKPHOS 121 126 113   BILITOTAL 2.5* 2.8* 3.0*   ALT 22 24 20   AST 41 51* 46*   PROTTOTAL 7.1 7.0 7.0   ALBUMIN 1.6* 1.6* 1.7*       Iron Panel -   Recent Labs   Lab Test 07/19/21  0632   IRON 31*   IRONSAT 43           Current Medications:   sodium bicarbonate  325 mg Per Feeding Tube Q4H    And    amylase-lipase-protease  1-2 capsule Per Feeding Tube Q4H    amylase-lipase-protease  2 capsule Oral TID w/meals    B and C vitamin Complex with folic acid  5 mL Per Feeding Tube Daily    ceFEPIme (MAXIPIME) IV  1 g Intravenous Q24H    folic acid  1 mg Oral Daily    Or    folic acid  1 mg Intravenous " Daily    [Held by provider] heparin ANTICOAGULANT  5,000 Units Subcutaneous Q12H    HYDROmorphone (STANDARD CONC)  2 mg Oral Q8H    lactulose  20 g Oral or NG Tube Daily    melatonin  6 mg Oral At Bedtime    metroNIDAZOLE  500 mg Intravenous Q12H    pantoprazole (PROTONIX) IV  40 mg Intravenous BID    rifaximin  550 mg Oral BID    sodium chloride (PF)  100 mL Irrigation Q4H LENKA    sodium chloride (PF)  3 mL Intracatheter Q8H    sodium chloride (PF)  3 mL Intracatheter Q8H    sodium chloride (PF)  73 mL Intravenous Once    sodium chloride 0.9% (bottle)        vitamin B1  100 mg Oral Daily    Or    thiamine  100 mg Intravenous Daily      - MEDICATION INSTRUCTIONS -      dextrose      [Held by provider] heparin 1,300 Units/hr (08/08/21 1818)     I have seen and examined this patient with the NP and reviewed the labs.  This note reflects our joint assessment and plan.     Yu Dow MD

## 2021-08-10 ENCOUNTER — APPOINTMENT (OUTPATIENT)
Dept: SPEECH THERAPY | Facility: CLINIC | Age: 31
End: 2021-08-10
Attending: INTERNAL MEDICINE
Payer: MEDICAID

## 2021-08-10 ENCOUNTER — APPOINTMENT (OUTPATIENT)
Dept: CT IMAGING | Facility: CLINIC | Age: 31
End: 2021-08-10
Attending: INTERNAL MEDICINE
Payer: MEDICAID

## 2021-08-10 ENCOUNTER — APPOINTMENT (OUTPATIENT)
Dept: GENERAL RADIOLOGY | Facility: CLINIC | Age: 31
End: 2021-08-10
Attending: INTERNAL MEDICINE
Payer: MEDICAID

## 2021-08-10 LAB
ALBUMIN SERPL-MCNC: 1.4 G/DL (ref 3.4–5)
ALP SERPL-CCNC: 110 U/L (ref 40–150)
ALT SERPL W P-5'-P-CCNC: 18 U/L (ref 0–70)
ANION GAP SERPL CALCULATED.3IONS-SCNC: 13 MMOL/L (ref 3–14)
APPEARANCE FLD: ABNORMAL
AST SERPL W P-5'-P-CCNC: 31 U/L (ref 0–45)
BILIRUB SERPL-MCNC: 2.2 MG/DL (ref 0.2–1.3)
BUN SERPL-MCNC: 30 MG/DL (ref 7–30)
CALCIUM SERPL-MCNC: 7.6 MG/DL (ref 8.5–10.1)
CHLORIDE BLD-SCNC: 91 MMOL/L (ref 94–109)
CO2 SERPL-SCNC: 19 MMOL/L (ref 20–32)
COLOR FLD: YELLOW
CREAT SERPL-MCNC: 3.81 MG/DL (ref 0.66–1.25)
EOSINOPHIL NFR FLD MANUAL: 1 %
ERYTHROCYTE [DISTWIDTH] IN BLOOD BY AUTOMATED COUNT: 15.9 % (ref 10–15)
GFR SERPL CREATININE-BSD FRML MDRD: 20 ML/MIN/1.73M2
GLUCOSE BLD-MCNC: 142 MG/DL (ref 70–99)
HCT VFR BLD AUTO: 23 % (ref 40–53)
HGB BLD-MCNC: 7.9 G/DL (ref 13.3–17.7)
LACTATE SERPL-SCNC: 2.3 MMOL/L (ref 0.7–2)
LYMPHOCYTES NFR FLD MANUAL: 17 %
MAGNESIUM SERPL-MCNC: 2.3 MG/DL (ref 1.6–2.3)
MCH RBC QN AUTO: 29.8 PG (ref 26.5–33)
MCHC RBC AUTO-ENTMCNC: 34.3 G/DL (ref 31.5–36.5)
MCV RBC AUTO: 87 FL (ref 78–100)
MONOS+MACROS NFR FLD MANUAL: NORMAL %
NEUTS BAND NFR FLD MANUAL: 15 %
OTHER CELLS FLD MANUAL: 67 %
PHOSPHATE SERPL-MCNC: 6 MG/DL (ref 2.5–4.5)
PLATELET # BLD AUTO: 298 10E3/UL (ref 150–450)
POTASSIUM BLD-SCNC: 3.6 MMOL/L (ref 3.4–5.3)
PROT SERPL-MCNC: 6.7 G/DL (ref 6.8–8.8)
RBC # BLD AUTO: 2.65 10E6/UL (ref 4.4–5.9)
SODIUM SERPL-SCNC: 123 MMOL/L (ref 133–144)
WBC # BLD AUTO: 16.8 10E3/UL (ref 4–11)
WBC # FLD AUTO: 604 /UL

## 2021-08-10 PROCEDURE — 74174 CTA ABD&PLVS W/CONTRAST: CPT | Mod: 26 | Performed by: RADIOLOGY

## 2021-08-10 PROCEDURE — 92526 ORAL FUNCTION THERAPY: CPT | Mod: GN

## 2021-08-10 PROCEDURE — 71275 CT ANGIOGRAPHY CHEST: CPT

## 2021-08-10 PROCEDURE — 99207 PR CDG-CUT & PASTE-POTENTIAL IMPACT ON LEVEL: CPT | Performed by: STUDENT IN AN ORGANIZED HEALTH CARE EDUCATION/TRAINING PROGRAM

## 2021-08-10 PROCEDURE — 83605 ASSAY OF LACTIC ACID: CPT | Performed by: STUDENT IN AN ORGANIZED HEALTH CARE EDUCATION/TRAINING PROGRAM

## 2021-08-10 PROCEDURE — 250N000013 HC RX MED GY IP 250 OP 250 PS 637: Performed by: INTERNAL MEDICINE

## 2021-08-10 PROCEDURE — 250N000011 HC RX IP 250 OP 636: Performed by: INTERNAL MEDICINE

## 2021-08-10 PROCEDURE — 80053 COMPREHEN METABOLIC PANEL: CPT | Performed by: INTERNAL MEDICINE

## 2021-08-10 PROCEDURE — 258N000003 HC RX IP 258 OP 636: Performed by: CLINICAL NURSE SPECIALIST

## 2021-08-10 PROCEDURE — 84100 ASSAY OF PHOSPHORUS: CPT | Performed by: INTERNAL MEDICINE

## 2021-08-10 PROCEDURE — 36592 COLLECT BLOOD FROM PICC: CPT | Performed by: INTERNAL MEDICINE

## 2021-08-10 PROCEDURE — 634N000001 HC RX 634: Performed by: CLINICAL NURSE SPECIALIST

## 2021-08-10 PROCEDURE — 120N000005 HC R&B MS OVERFLOW UMMC

## 2021-08-10 PROCEDURE — 250N000013 HC RX MED GY IP 250 OP 250 PS 637: Performed by: STUDENT IN AN ORGANIZED HEALTH CARE EDUCATION/TRAINING PROGRAM

## 2021-08-10 PROCEDURE — 250N000011 HC RX IP 250 OP 636: Performed by: NURSE PRACTITIONER

## 2021-08-10 PROCEDURE — 99233 SBSQ HOSP IP/OBS HIGH 50: CPT | Performed by: CLINICAL NURSE SPECIALIST

## 2021-08-10 PROCEDURE — 99233 SBSQ HOSP IP/OBS HIGH 50: CPT | Mod: GC | Performed by: STUDENT IN AN ORGANIZED HEALTH CARE EDUCATION/TRAINING PROGRAM

## 2021-08-10 PROCEDURE — 250N000011 HC RX IP 250 OP 636: Performed by: STUDENT IN AN ORGANIZED HEALTH CARE EDUCATION/TRAINING PROGRAM

## 2021-08-10 PROCEDURE — 250N000011 HC RX IP 250 OP 636

## 2021-08-10 PROCEDURE — 250N000013 HC RX MED GY IP 250 OP 250 PS 637

## 2021-08-10 PROCEDURE — 85014 HEMATOCRIT: CPT | Performed by: INTERNAL MEDICINE

## 2021-08-10 PROCEDURE — 250N000009 HC RX 250: Performed by: INTERNAL MEDICINE

## 2021-08-10 PROCEDURE — 83735 ASSAY OF MAGNESIUM: CPT | Performed by: INTERNAL MEDICINE

## 2021-08-10 PROCEDURE — 99233 SBSQ HOSP IP/OBS HIGH 50: CPT | Mod: GC | Performed by: INTERNAL MEDICINE

## 2021-08-10 PROCEDURE — P9047 ALBUMIN (HUMAN), 25%, 50ML: HCPCS | Performed by: NURSE PRACTITIONER

## 2021-08-10 PROCEDURE — 99233 SBSQ HOSP IP/OBS HIGH 50: CPT | Performed by: PHYSICIAN ASSISTANT

## 2021-08-10 PROCEDURE — 74230 X-RAY XM SWLNG FUNCJ C+: CPT | Mod: 26 | Performed by: RADIOLOGY

## 2021-08-10 PROCEDURE — 92611 MOTION FLUOROSCOPY/SWALLOW: CPT | Mod: GN

## 2021-08-10 PROCEDURE — 74230 X-RAY XM SWLNG FUNCJ C+: CPT

## 2021-08-10 PROCEDURE — 36592 COLLECT BLOOD FROM PICC: CPT | Performed by: STUDENT IN AN ORGANIZED HEALTH CARE EDUCATION/TRAINING PROGRAM

## 2021-08-10 PROCEDURE — 90937 HEMODIALYSIS REPEATED EVAL: CPT

## 2021-08-10 PROCEDURE — 71275 CT ANGIOGRAPHY CHEST: CPT | Mod: 26 | Performed by: RADIOLOGY

## 2021-08-10 PROCEDURE — C9113 INJ PANTOPRAZOLE SODIUM, VIA: HCPCS

## 2021-08-10 RX ORDER — IOPAMIDOL 755 MG/ML
100 INJECTION, SOLUTION INTRAVASCULAR ONCE
Status: COMPLETED | OUTPATIENT
Start: 2021-08-10 | End: 2021-08-10

## 2021-08-10 RX ORDER — ALBUMIN (HUMAN) 12.5 G/50ML
12.5 SOLUTION INTRAVENOUS ONCE
Status: COMPLETED | OUTPATIENT
Start: 2021-08-10 | End: 2021-08-10

## 2021-08-10 RX ADMIN — ALBUMIN HUMAN 12.5 G: 0.25 SOLUTION INTRAVENOUS at 21:44

## 2021-08-10 RX ADMIN — HYDROMORPHONE HYDROCHLORIDE 1 MG: 1 SOLUTION ORAL at 13:16

## 2021-08-10 RX ADMIN — SODIUM CHLORIDE 250 ML: 9 INJECTION, SOLUTION INTRAVENOUS at 14:05

## 2021-08-10 RX ADMIN — SODIUM BICARBONATE 325 MG: 325 TABLET ORAL at 08:15

## 2021-08-10 RX ADMIN — SODIUM BICARBONATE 325 MG: 325 TABLET ORAL at 04:26

## 2021-08-10 RX ADMIN — HYDROMORPHONE HYDROCHLORIDE 1 MG: 1 SOLUTION ORAL at 09:51

## 2021-08-10 RX ADMIN — SODIUM BICARBONATE 325 MG: 325 TABLET ORAL at 13:22

## 2021-08-10 RX ADMIN — HYDROXYZINE HYDROCHLORIDE 50 MG: 25 TABLET, FILM COATED ORAL at 09:56

## 2021-08-10 RX ADMIN — HYDROMORPHONE HYDROCHLORIDE 2 MG: 1 SOLUTION ORAL at 23:58

## 2021-08-10 RX ADMIN — Medication 550 MG: at 08:21

## 2021-08-10 RX ADMIN — SODIUM BICARBONATE 325 MG: 325 TABLET ORAL at 18:34

## 2021-08-10 RX ADMIN — METRONIDAZOLE 500 MG: 500 INJECTION, SOLUTION INTRAVENOUS at 18:34

## 2021-08-10 RX ADMIN — PANTOPRAZOLE SODIUM 40 MG: 40 INJECTION, POWDER, FOR SOLUTION INTRAVENOUS at 08:20

## 2021-08-10 RX ADMIN — PANCRELIPASE 2 CAPSULE: 24000; 76000; 120000 CAPSULE, DELAYED RELEASE PELLETS ORAL at 23:58

## 2021-08-10 RX ADMIN — ONDANSETRON 4 MG: 4 TABLET, ORALLY DISINTEGRATING ORAL at 09:49

## 2021-08-10 RX ADMIN — PANTOPRAZOLE SODIUM 40 MG: 40 INJECTION, POWDER, FOR SOLUTION INTRAVENOUS at 21:45

## 2021-08-10 RX ADMIN — METRONIDAZOLE 500 MG: 500 INJECTION, SOLUTION INTRAVENOUS at 05:14

## 2021-08-10 RX ADMIN — HYDROXYZINE HYDROCHLORIDE 50 MG: 25 TABLET, FILM COATED ORAL at 22:11

## 2021-08-10 RX ADMIN — SODIUM BICARBONATE 325 MG: 325 TABLET ORAL at 21:44

## 2021-08-10 RX ADMIN — GUAIFENESIN 10 ML: 200 SOLUTION ORAL at 02:18

## 2021-08-10 RX ADMIN — GUAIFENESIN 10 ML: 200 SOLUTION ORAL at 18:56

## 2021-08-10 RX ADMIN — HYDROMORPHONE HYDROCHLORIDE 2 MG: 1 SOLUTION ORAL at 08:12

## 2021-08-10 RX ADMIN — THIAMINE HCL TAB 100 MG 100 MG: 100 TAB at 08:21

## 2021-08-10 RX ADMIN — MELATONIN TAB 3 MG 6 MG: 3 TAB at 23:57

## 2021-08-10 RX ADMIN — PANCRELIPASE 2 CAPSULE: 24000; 76000; 120000 CAPSULE, DELAYED RELEASE PELLETS ORAL at 18:35

## 2021-08-10 RX ADMIN — Medication 550 MG: at 21:44

## 2021-08-10 RX ADMIN — CEFEPIME HYDROCHLORIDE 1 G: 1 INJECTION, POWDER, FOR SOLUTION INTRAMUSCULAR; INTRAVENOUS at 21:44

## 2021-08-10 RX ADMIN — SODIUM CHLORIDE 300 ML: 9 INJECTION, SOLUTION INTRAVENOUS at 14:06

## 2021-08-10 RX ADMIN — SODIUM BICARBONATE 325 MG: 325 TABLET ORAL at 23:59

## 2021-08-10 RX ADMIN — FOLIC ACID 1 MG: 1 TABLET ORAL at 08:20

## 2021-08-10 RX ADMIN — PANCRELIPASE 2 CAPSULE: 24000; 76000; 120000 CAPSULE, DELAYED RELEASE PELLETS ORAL at 08:15

## 2021-08-10 RX ADMIN — PANCRELIPASE 2 CAPSULE: 24000; 76000; 120000 CAPSULE, DELAYED RELEASE PELLETS ORAL at 13:30

## 2021-08-10 RX ADMIN — PANCRELIPASE 2 CAPSULE: 24000; 76000; 120000 CAPSULE, DELAYED RELEASE PELLETS ORAL at 21:44

## 2021-08-10 RX ADMIN — IOPAMIDOL 100 ML: 755 INJECTION, SOLUTION INTRAVENOUS at 12:54

## 2021-08-10 RX ADMIN — HYDROMORPHONE HYDROCHLORIDE 2 MG: 1 SOLUTION ORAL at 18:37

## 2021-08-10 RX ADMIN — EPOETIN ALFA-EPBX 5000 UNITS: 10000 INJECTION, SOLUTION INTRAVENOUS; SUBCUTANEOUS at 15:10

## 2021-08-10 RX ADMIN — Medication 5 ML: at 08:21

## 2021-08-10 RX ADMIN — PANCRELIPASE 2 CAPSULE: 24000; 76000; 120000 CAPSULE, DELAYED RELEASE PELLETS ORAL at 04:25

## 2021-08-10 RX ADMIN — ONDANSETRON 4 MG: 2 INJECTION INTRAMUSCULAR; INTRAVENOUS at 18:53

## 2021-08-10 RX ADMIN — HYDROXYZINE HYDROCHLORIDE 50 MG: 25 TABLET, FILM COATED ORAL at 13:16

## 2021-08-10 RX ADMIN — HYDROMORPHONE HYDROCHLORIDE 1 MG: 1 SOLUTION ORAL at 05:33

## 2021-08-10 ASSESSMENT — ACTIVITIES OF DAILY LIVING (ADL)
ADLS_ACUITY_SCORE: 17
ADLS_ACUITY_SCORE: 18
ADLS_ACUITY_SCORE: 17

## 2021-08-10 ASSESSMENT — MIFFLIN-ST. JEOR: SCORE: 1597.5

## 2021-08-10 NOTE — PLAN OF CARE
End of Shift Summary. See flowsheets for vital signs and detailed assessment.    Changes this shift: patient without acute event or change. Remains afebrile and hemodynamically stable. Maintaining sats on 2L NC. Had 2 episodes of emesis during shift, treated successfully with zofran, but not tolerating significant po intake.     Plan: continue to monitor patient status; notify physician of changes.

## 2021-08-10 NOTE — PROGRESS NOTES
[facility]    Progress Note - [facility]oon 3 Service        Date of Admission:  6/28/2021    Assessment & Plan   Dax Villareal is a 30 year old male with hx of alcohol use disorder admitted on 6/28/2021 after recent prolonged admission at [facility] in [place] who has severe acute alcoholic hepatitis c/b HE, ESRD requiring HD, and malnutrition in the setting of acute necrotizing pancreatitis, s/p PEG-J placement on 7/13. He arrived with acute respiratory failure and septic shock that have resolved, and his secondary bacterial peritonitis has been appropriately treated. He requires continued management for ESRD, hepatic encephalopathy, infected pancreatic pseudocyst, nutritional support via PEG-J, and persistent right pleural effusion.     Today:  - Possible PE treated with heparin overnight, failed with episodes of bleeding in stool  - Ultrasounds negative for DVT, no indication for IVC filter per heme  - Thoracentesis today by CAPS, labs pending  - Plan for dialysis on Tuesday and Video swallow study  - CT on Wednesday and repeat procedure Thursday. Will plan for repeat CTA-Chest on Wednesday     # Necrotizing alcoholic pancreatitis w/ infected pseudocyst s/p RP Drain and Cystogastrostomy  # Septic Shock, resolved   # Leukocytosis    Admitted to the [facility] ICU on 6/28/2021 from [facility] in [place] for septic shock due to necrotizing pancreatitis. CT on admit showing mature collection with enhancing wall measuring ~[id number]. IR placed perc drain 6/29. Repeat imaging 7/18 revealed walled off necrotic collection amenable to endoscopic transluminal drainage with cystgastrostomy stenting with necrosectomy on 7/21/21.  7/28 - EGD, stent replacement (transluminal Axios with coaxial Solus), PEG-J removal (buried bumper) and replacement of new PEG-J with one new T-tag. Repeat necrosectomy and Axios removal/replacement Friday 8/6. Repeat CT on 8/9     - Continue Cefepime 1  "gm Q 24 + flagyl through endoscopic debridement on 8/6 per GI  - Daily CBC, watching fever curve  - GI following, appreciate recs              - Pancreatic enzymes   - Flush drain with 100ml q4hr per GI   - Blood cultures and fluid cultures NGTD    # Secondary Bacterial Peritonitis 2/2 infected pseudocyst  # Peripancreatic fluid collection growing VRE   7/2 - Diagnostic paracentesis (WBC 2663 - 74% PMN, SAAG <1.1, protein 4.1)     Antibiotics:   Current: Cefepime 1 gm Q 24 + flagyl 500 q8 (8/6-present)     Past:  - Linezolid 7/21-7/30  - Vancomcyin 6/29  - Meropenem 6/29-7/18  - Micafungin 6/29-7/1  - Cipro 7/19-7/21  - Ceftriaxone 7/21-7/24; 7/26-7/27  - Zosyn 7/27-8/6    - Paracentesis PRN      # Right hepatic/pancreatic hydrothorax  Intermittently increased subjective shortness of breath. CXR on 7/19 w/ increased RLL haziness; ddx includes pleural effusion v pneumonia v atelectasis. CT on 7/28 showed large volume R hepatic hydrothorax with complete atelectasis of right lung, bedside thora completed 7/28 with labs and cultures.     CXR 7/30 showed moderate R pleural effusion, unchanged from previous and improved aeration of right lung with small remaining pleural effusion on CT. Thora cultures negative. Repeat Thora on 8/9     - IR completed thoracentesis 7/28 - exudative  - ID following: agree to continue with empiric abx, though pleural effusion most likely 2/2 hepatic hydrothorax and pancreatitis. Defer plan for repeat thoracentesis based on symptoms and oxygen requirement. No plan for chest tube given concern for infected pleural effusion  - Supplemental oxygen as needed to keep sats above 89%  - Repeat Thoracentesis PRN for dyspnea    #Non-obstructive Left Upper Lobe Pulmonary Embolus  Patient CTA-Chest on 8/8 with \"possible\" non-obstructive PE in KALYAN Pulmonary Artery Branch. Patient has largely been bed bound and not on DVT prophylaxis for several days, especially in setting of recent procedures. Troponin " negative x2. Failed heparin treatment with BRBPR. Ultrasounds negative for DVT.  -Post thoracentesis will repeat CTA-Chest to better evaluate possible PE  - Resume DVT prophylaxis 72 hours after next cysto-gastrectomy (planned for 8/12)    # Lactic acidosis  Multifactorial d/t decreased lactic clearance in the setting of acute hepatic failure, ESRD, and acute on chronic infection.  - Continue to trend and assess vitals     # Gastric Outlet Obstruction  High G tube output due to the cystogastric connection/fluid drainage noted on 8/5. Per GI, possibly related to gastric outlet from severe abdominal inflammation and expected. KUB negative for obstructive gas pattern. Ileus from narcotics considered, but output not consistent with tube feeds  -Improved after cystogastrectomy  -Anti-emetics  -Will monitor and continue to discuss with GI  -Having fluid removal per nephrology due to HD    #Hypokalemia  High GI output state. Replete as needed with ESRD noted. Improved with decreased G-tube drainage after cystogastrectomy.    # Severe malnutrition in setting of chronic illness  PEG-J placed 7/13. Patient intermittently refusing tube feeds d/t nausea/vomiting.  - Dietitian consult for new tube feeds, appreciate recs   - Continuous TF per dietician order (advancing VERY slowly) with goal of 65 ml/hr              - Cont panc enzymes   - Will trial regular diet with small bites. If vomiting, make NPO  - Regular Diet, thin liquids per SLP  - VFSS for 8/10 to evaluate for dysphagia  - Continue multivitamins  - Pantoprazole 40 mg IV daily  - Zofran PRN     # Alcoholic hepatitis complicated by portal hypertension, ascites, and HE - improving   # Hx of Alcohol use disorder c/b withdrawal   # Anemia  # Coagulopathy   Patient with a history of marked alcohol use and alcoholic hepatitis in the past. Was given a course of steroids (1 week) during previous admission at OSH. Patient mental status has been stable, alert and oriented x4  after rifaxamin and aggressive lactulose. Not a liver transplant candidate.      - Lactulose 20g daily and PRN and rifaximin 550mg BID - as patient allows  - Daily folic acid and thiamine  - Delirium precautions  - Melatonin 6mg at bedtime  - Daily CBC   - Transfuse if hemoglobin < 7, platelet < 10 or < 30 with bleeding  - Continue to monitor for signs of bleeding      # Stage III RADHA on HD due to ATN  # ESRD due to ATN  # Hypervolemia causing acute respiratory failure, resolved  Nephrology note 7/21 indicates that patient has been on renal replacement therapy for two months and pt status has most likely transitioned from ARF to ESRD at this point.          > Hemodialysis as per renal reccs              - Midodrine 10 mg PRN before dialysis sessions     > Daily chemistry labs, strict I/O, and daily weights  - Aspiration precautions  - Supplemental oxygen as needed to keep sats above 89%    #Anemia  Baseline hgb 17. Running between 6-8 in setting of chronic illness, new ESRD, frequent lab draws and procedures.  - Goal hgb >7.0, trend Hgb    # Intermittent lactic acidosis  Suspect due to poor lactate clearance due to ESRD and ESLD + ongoing inflammation/infection. No fluids given due to patient being on dialysis    # Apathy, concern for depression  Patient is intermittently refusing cares due to wanting a 'break,' but also reaffirms that he wants everything done to prolong his life. Mood is depressed, apathetic at times. Patient has multiple stressors including his own medical conditions and family health issues.  - Continue to offer emotional support services such as psychiatry, palliative,   - He declined psychiatry/psychology visits     # Goals of Care  Please see additional interval note for in depth details regarding conversation on 7/9 about goals of care. Patient's long term prognosis is still quite poor given his multi organ failure. Care conference on 7/15, pt did not wish to participate. Met with  sisters Noni (on phone) and Melyssa and close friend Leroy with palliative, nephrology and general medicine teams to discuss Dax's current improving status, poor prognosis and wishes to continue full cares. *   - Hepatology confirmed pt will not be a transplant candidate  - Daily phone updates to sister Noni ([phone])       Diet: Regular Diet Adult  Adult Formula Drip Feeding: Continuous Vital 1.5; Jejunostomy; Goal Rate: 60; mL/hr; Medication - Feeding Tube Flush Frequency: At least 15-30 mL water before and after medication administration and with tube clogging; Amount to Send (Nutrition us...    DVT Prophylaxis: Pneumatic Compression Devices  Rdz Catheter: Not present  Fluids: 10 mL NS TKO  Central Lines: PRESENT  PICC Triple Lumen 06/28/21 Left-Site Assessment: WDL  CVC Double Lumen Right-Site Assessment: WDL  Code Status: Full Code      Disposition Plan   Expected discharge: >7 days, recommended to transitional care unit once  nutrition improved, infected pancreatic pseudocyst is well managed and plan for dialysis is in place.       Johny Ortiz MD  [phone]    [facility]  Securely message with the Vocera Web Console (learn more here)  Text page via [facility] Paging/Directory  Please see sign in/sign out for up to date coverage information  _____________________________________________________________________    Interval History   Bleeding overnight after starting heparin for possible PE. Tolerating diet well.    Data reviewed today: I reviewed all medications, new labs and imaging results over the last 24 hours.    Physical Exam   Vital Signs: Temp: 98.4  F (36.9  C) Temp src: Oral BP: 103/50 Pulse: 118   Resp: 18 SpO2: 96 % O2 Device: Nasal cannula Oxygen Delivery: 3 LPM  Weight: 166 lbs 7.16 oz  Constitutional: Awake, non-diaphoretic Ox3  HEENT: Normocephalic. Atraumatic. No scleral icterus   Cardiovascular: tachycardic, tender chest to palpation  diffusely  Respiratory: slight tachypnea, decreased breath sounds in right lower lung and rhonchi in right upper lobe  Musculoskeletal: Emaciated, Hammer toe deformity  Skin: resolving jaundice    Data   Recent Labs   Lab 08/09/21  1312 08/09/21  0326 08/08/21  2241 08/08/21  1435 08/08/21  0509 08/07/21  0637 08/06/21  1002   WBC 15.1* 17.4* 18.9*  --  18.8* 17.5*  --    HGB 8.3* 8.0* 8.0*  8.0*  --  8.0* 6.9*  --    MCV 90 88 87  --  89 91  --     275 267  --  264 269  --    INR  --   --   --   --  1.74*  --  1.68*   NA  --  126*  --   --  129* 126*  --    POTASSIUM  --  3.6  --   --  3.9 3.9  --    CHLORIDE  --  93*  --   --  97 91*  --    CO2  --  24  --   --  22 22  --    BUN  --  19  --   --  13 27  --    CR  --  2.81*  --   --  2.01* 3.31*  --    ANIONGAP  --  9  --   --  10 13  --    JANENE  --  7.8*  --   --  8.0* 8.4*  --    GLC  --  139*  --   --  128* 68*  --    ALBUMIN  --  1.6*  --   --  1.6* 1.7*  --    PROTTOTAL  --  7.1  --   --  7.0 7.0  --    BILITOTAL  --  2.5*  --   --  2.8* 3.0*  --    ALKPHOS  --  121  --   --  126 113  --    ALT  --  22  --   --  24 20  --    AST  --  41  --   --  51* 46*  --    TROPONIN  --   --   --  <0.015 <0.015  --   --      Recent Results (from the past 24 hour(s))   POC US GUIDE FOR THORACENTESIS    Impression    Rt thorax with pleural effusion prior to thoracentesis (us guided)   US Upper Extremity Venous Duplex Bilat    Narrative    EXAMINATION: DOPPLER VENOUS ULTRASOUND OF BILATERAL UPPER EXTREMITIES,  8/9/2021 2:50 PM     COMPARISON: None.    HISTORY: History of PE, assessing extremities for clot burden    TECHNIQUE:  Gray-scale evaluation with compression, spectral flow, and  color Doppler assessment of the deep venous system of both upper  extremities.    FINDINGS:  Normal blood flow and waveforms are demonstrated in the internal  jugular, innominate, subclavian, and axillary veins bilaterally. There  is normal compressibility of the brachial, basilic and  cephalic veins  bilaterally.      Impression    IMPRESSION:  1.  No evidence of deep venous thrombosis in either upper extremity.    I have personally reviewed the examination and initial interpretation  and I agree with the findings.    MIGUEL WALKER MD         SYSTEM ID:  [id number]   US Lower Extremity Venous Duplex Bilateral    Narrative    EXAMINATION: DOPPLER VENOUS ULTRASOUND OF BILATERAL LOWER EXTREMITIES,  8/9/2021 2:51 PM     COMPARISON: None.    HISTORY: History of PE, assessing extremities for clot burden    TECHNIQUE:  Gray-scale evaluation with compression, spectral flow and  color Doppler assessment of the deep venous system of both legs from  groin to knee, and then at the ankles.    FINDINGS:  In both lower extremities, the common femoral, femoral, popliteal and  posterior tibial veins demonstrate normal compressibility and blood  flow.      Impression    IMPRESSION:  1.  No evidence of deep venous thrombosis in either lower extremity.    I have personally reviewed the examination and initial interpretation  and I agree with the findings.    MIGUEL WALKER MD         SYSTEM ID:  [id number]   XR Chest Port 1 View    Narrative    Portable chest    INDICATION: Right thoracentesis    COMPARISON: 7/22/2021    FINDINGS: Interval decrease in the right pleural effusion. Right IJ  large bore catheter tip is in the proximal right atrium. Left PICC tip  is at the cavoatrial junction. No obvious pneumothorax. Heart size  normal.      Impression    IMPRESSION: Decrease of right pleural effusion postthoracentesis. No  evidence of pneumothorax radiographically.    CHELLE STARKS MD         SYSTEM ID:  [id number]

## 2021-08-10 NOTE — PROGRESS NOTES
HEMODIALYSIS TREATMENT NOTE    Date: 8/10/2021  Time: 5:47 PM    Data:  Pre Wt: 70.5 kg (155 lb 6.8 oz)   Desired Wt: 68 kg   Post Wt: 69.5 kg (153 lb 3.5 oz) (estimate)  Weight change: 1 kg  Ultrafiltration - Post Run Net Total Removed (mL): 1600 mL  Vascular Access Status: patent  Dialyzer Rinse: Clear  Total Blood Volume Processed: 76.23 L  Total Dialysis (Treatment) Time: 3.5hrs    Lab:   No     Assessment/Interventions:  Patient ran 3.5hrs via CVC with BFR of 400ml/hr. UF goal reduced due to  SBP <90. Net fluid removal 1.6kg. Patient denied any discomfort for whole run. post dialysis hand off report given to primary floor RN.      Plan:    Next HD run per renal team.

## 2021-08-10 NOTE — PROGRESS NOTES
Nephrology Progress Note  08/10/2021         Dax Villareal is a 30 year old male with h/o ETOH hepatitis, end stage liver disease, RADHA on HD, transferred from [facility] in [place] for septic shock on 6/28/21 for treatment of necrotizing pancreatitis and decompensated liver disease. He was initially admitted to [facility] 5/19/21.        Interval History :   Mr Villareal had thoracentesis yesterday and also large volume out of pancreatic drain.  Running HD today, pulling 2L as BP's allow as he has been fairly stable.   Na low, should correct with RRT, has fluid restriction.        Assessment & Recommendations:   RADHA-Cr was 0.8 as recently as 5/19/2021 but now HD dependent, started RRT at [facility] prior to transfer to [facility] (exact date unclear but per family it was end of May).  Etiology likely multifactorial with contrast, sepsis/pancreatitis, likely HRS physiology contributing as well.  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  Continuing RRT as long as it is tolerated with regards to BP's, is functional enough to possibly do outpt HD.  MELD is in high 30's so has high 3 month mortality but continuing restorative cares for now.  Had GI stenting of pancreas x3.                 -Line is TDC from PTA               - HD on TTS schedule unless issues arise.      Volume status-Anuric, net negative yesterday on his own with thoracentesis and pancreatic drain.       Electrolytes/pH-K normal and Na low at 123, on fluid restriction for hyponatremia, running HD today, did not order low Na as it has not corrected with normal Na on runs.      Ca/phos/pth-Ca 7.6, corrects to normal with low albumin.  Mg and Phos reasonable.       Anemia-Hgb low, acute management per team, last PRBC's 7/14.  Started on EPO 5k with runs, iron sats 43%      Nutrition-Taking PO, appetite reasonable.       Seen and discussed with Dr Dow      Recommendations were communicated to primary team via note       SOFIA Marlow CNS  Clinical  "Nurse Specialist  [phone]    Review of Systems:   I reviewed the following systems:  Gen: No fevers or chills  CV: No CP at rest  Resp: No SOB at rest  GI: No N/V    Physical Exam:   I/O last 3 completed shifts:  In: 1620 [I.V.:20; Other:400; NG/GT:120]  Out: 2025 [Drains:2025]   BP (!) 89/45   Pulse (!) 124   Temp 98.1  F (36.7  C) (Oral)   Resp (!) 38   Ht 1.676 m (5' 5.98\")   Wt 70 kg (154 lb 5.2 oz)   SpO2 99%   BMI 24.92 kg/m       GENERAL APPEARANCE: Sitting at edge of bed, in no distress.   EYES:  scleral icterus, pupils equal  Pulmonary: lungs clear  CV: tachy   - Edema - no LE edema  GI: large distended, tender. Has retroperitoneal drain  MS: no evidence of inflammation in joints, no muscle tenderness  SKIN: no rash, warm, dry, no cyanosis  NEURO: alert/interactive  ACCESS: Right TDC    Labs:   All labs reviewed by me  Electrolytes/Renal - Recent Labs   Lab Test 08/10/21  0631 08/09/21  0326 08/08/21  0509   * 126* 129*   POTASSIUM 3.6 3.6 3.9   CHLORIDE 91* 93* 97   CO2 19* 24 22   BUN 30 19 13   CR 3.81* 2.81* 2.01*   * 139* 128*   JANENE 7.6* 7.8* 8.0*   MAG 2.3 2.3 1.5*   PHOS 6.0* 5.2* 2.3*       CBC -   Recent Labs   Lab Test 08/10/21  0631 08/09/21  1312 08/09/21  0326   WBC 16.8* 15.1* 17.4*   HGB 7.9* 8.3* 8.0*    290 275       LFTs -   Recent Labs   Lab Test 08/10/21  0631 08/09/21  0326 08/08/21  0509   ALKPHOS 110 121 126   BILITOTAL 2.2* 2.5* 2.8*   ALT 18 22 24   AST 31 41 51*   PROTTOTAL 6.7* 7.1 7.0   ALBUMIN 1.4* 1.6* 1.6*       Iron Panel -   Recent Labs   Lab Test 07/19/21  0632   IRON 31*   IRONSAT 43           Current Medications:    sodium bicarbonate  325 mg Per Feeding Tube Q4H    And     amylase-lipase-protease  1-2 capsule Per Feeding Tube Q4H     amylase-lipase-protease  2 capsule Oral TID w/meals     B and C vitamin Complex with folic acid  5 mL Per Feeding Tube Daily     banatrol plus  1 packet Per Feeding Tube TID     ceFEPIme (MAXIPIME) IV  1 g " Intravenous Q24H     epoetin charles-epbx (RETACRIT) inj ESRD  5,000 Units Intravenous Once in dialysis/CRRT     folic acid  1 mg Oral Daily    Or     folic acid  1 mg Intravenous Daily     [Held by provider] heparin ANTICOAGULANT  5,000 Units Subcutaneous Q12H     HYDROmorphone (STANDARD CONC)  2 mg Oral Q8H     melatonin  6 mg Oral At Bedtime     metroNIDAZOLE  500 mg Intravenous Q12H     - MEDICATION INSTRUCTIONS -   Does not apply Once     pantoprazole (PROTONIX) IV  40 mg Intravenous BID     rifaximin  550 mg Oral BID     sodium chloride (PF)  100 mL Irrigation Q4H LENKA     sodium chloride (PF)  3 mL Intracatheter Q8H     sodium chloride (PF)  3 mL Intracatheter Q8H     sodium chloride (PF)  73 mL Intravenous Once     sodium chloride (PF)  9 mL Intracatheter During Dialysis/CRRT (from stock)     sodium chloride (PF)  9 mL Intracatheter During Dialysis/CRRT (from stock)     vitamin B1  100 mg Oral Daily    Or     thiamine  100 mg Intravenous Daily       - MEDICATION INSTRUCTIONS -       dextrose       [Held by provider] heparin 1,300 Units/hr (08/08/21 1818)       I have seen and examined this patient with the NP.  This note reflects our joint assessment and plan.     Yu Dow MD

## 2021-08-10 NOTE — PROGRESS NOTES
08/10/21 1100   General Information   Onset of Illness/Injury or Date of Surgery 06/28/21   Referring Physician Johny Ortiz MD   Patient/Family Therapy Goal Statement (SLP) None stated   Pertinent History of Current Problem Dax Villareal is a 30 year old male with hx of alcohol use disorder admitted on 6/28/2021 after recent prolonged admission at [facility] in [place] who has severe acute alcoholic hepatitis c/b HE, ESRD requiring HD, and malnutrition in the setting of acute necrotizing pancreatitis, s/p PEG-J placement on 7/13. He arrived with acute respiratory failure and septic shock that have resolved, and his secondary bacterial peritonitis has been appropriately treated. He requires continued management for ESRD, hepatic encephalopathy, infected pancreatic pseudocyst, nutritional support via PEG-J, and persistent right pleural effusion.  Video swallow study completed per MD order.   General Observations Pt awake and alert   Past History of Dysphagia Pt was seen by SLP on 7/15/21 and discharged from SLP service on regular diet and thin liquids with supervision.  No hx of dysphagia PTA.       Present no   Pain Assessment   Patient Currently in Pain No   Type of Evaluation   Type of Evaluation Swallow Evaluation   Oral Motor   Oral Musculature generally intact   Dentition (Oral Motor)   Dentition (Oral Motor) adequate dentition   General Swallowing Observations   Swallowing Evaluation Videofluoroscopic swallow study (VFSS)   Clinical Swallow Evaluation   Feeding Assistance frequent cues/help required   VFSS Evaluation   Radiologist [facility] radiology resident   Views Taken left lateral  (pt with significant emesis and unable to complete A/P view)   Physical Location of Procedure Radiology suite 5   VFSS Textures Trialed thin liquids;pureed   VFSS Eval: Thin Liquid Texture Trial   Mode of Presentation, Thin Liquid cup;spoon;straw;fed by clinician;self-fed   Order of Presentation 1,  2, 3   Preparatory Phase WFL   Oral Phase, Thin Liquid WFL   Pharyngeal Phase, Thin Liquid WFL   Rosenbek's Penetration Aspiration Scale: Thin Liquid Trial Results 1 - no aspiration, contrast does not enter airway   Diagnostic Statement No aspiration or penetration   VFSS Evaluation: Puree Solid Texture Trial   Mode of Presentation, Puree spoon   Order of Presentation 4   Preparatory Phase WFL   Oral Phase, Puree WFL   Pharyngeal Phase, Puree WFL   Rosenbek's Penetration Aspiration Scale: Puree Food Trial Results 1 - no aspiration, contrast does not enter airway   Diagnostic Statement No aspiration or penetration   Esophageal Phase of Swallow   Patient reports or presents with symptoms of esophageal dysphagia Yes   Esophageal comments acute necrotizing pancreatitis, s/p PEG-J placement on 7/13.  Frequent emesis after PO including today's assessment   Swallowing Recommendations   Diet Consistency Recommendations thin liquids (level 0);regular diet   Supervision Level for Intake 1:1 supervision needed   Mode of Delivery Recommendations bolus size, small;food moistened;slow rate of intake   Swallowing Maneuver Recommendations alternate food and liquid intake   Monitoring/Assistance Required (Eating/Swallowing) stop eating activities when fatigue is present   Recommended Feeding/Eating Techniques (Swallow Eval) maintain upright sitting position for eating;maintain upright posture during/after eating for 30 minutes;minimize distractions during oral intake;provide assist with feeding;provide 6 smaller meals throughout day;set-up and prepare tray   Medication Administration Recommendations, Swallowing (SLP) whole in puree or via TF   General Therapy Interventions   Planned Therapy Interventions Dysphagia Treatment   Dysphagia treatment Instruction of safe swallow strategies   SLP Therapy Assessment/Plan   Criteria for Skilled Therapeutic Interventions Met (SLP Eval) yes;treatment indicated   SLP Diagnosis Functional  oropharyngeal swallowing mechanism   Rehab Potential (SLP Eval) good, to achieve stated therapy goals   Therapy Frequency (SLP Eval) one time eval and treatment only   Predicted Duration of Therapy Intervention (SLP Eval) 1 session   Comment, Therapy Assessment/Plan (SLP) Videoswallow study completed per MD order to objectively assess oropharyngeal swallow mechanism. Under fluoroscopy, pt presents with a functional oropharyngeal swallowing mechanism in setting of weakness, deconditioning. Pt assessed with thin via spoon/cup/straw, puree.  Pt with large emesis after 4 PO trial, and did not complete further PO. Oral phase characterized to be WFL. Swallow trigger WFL. Once triggered, velar elevation, BOT retraction, and pharyngeal stripping wave are WFL. Epiglottic inversion complete. No aspiration or penetration across consistencies, pharyngeal clearance WFL.  From an oropharyngeal swallowing standpoint, recommend regular diet and thin liquids with supervision. Pt to be fully alert and upright for all PO, taking small bites/sips at slow rate.  Pt should remain upright for 30-45 minutes following PO intake. Pt's recurrent vomiting after PO consumption may increase risk of aspiration from below despite normal oropharyngeal swallowing mechanism. No further SLP need indicated, SLP to complete orders.   Therapy Plan Review/Discharge Plan (SLP)   Therapy Plan Review (SLP) evaluation/treatment results reviewed;care plan/treatment goals reviewed;risks/benefits reviewed;current/potential barriers reviewed;participants voiced agreement with care plan;participants included;patient   Demonstrates Need for Referral to Another Service (SLP) clinical nutrition services/dietitian;occupational therapist;physical therapist   SLP Discharge Planning    SLP Discharge Recommendation (DC Rec)   (defer to PT/OT)   SLP Rationale for DC Rec Functional oropharyngeal swallowing mechanism   SLP Brief overview of current status  From an  oropharyngeal swallowing standpoint, recommend regular diet and thin liquids with supervision. Pt to be fully alert and upright for all PO, taking small bites/sips at slow rate.  Pt should remain upright for 30-45 minutes following PO intake. No further SLP need indicated, SLP to complete orders.    Total Evaluation Time   Total Evaluation Time (Minutes) 10

## 2021-08-10 NOTE — PROGRESS NOTES
[facility]    Progress Note - Maroon 3 Service        Date of Admission:  6/28/2021    Assessment & Plan   Dax Villareal is a 30 year old male with hx of alcohol use disorder admitted on 6/28/2021 after recent prolonged admission at [facility] in [place] who has severe acute alcoholic hepatitis c/b HE, ESRD requiring HD, and malnutrition in the setting of acute necrotizing pancreatitis, s/p PEG-J placement on 7/13. He arrived with acute respiratory failure and septic shock that have resolved, and his secondary bacterial peritonitis has been appropriately treated. He requires continued management for ESRD, hepatic encephalopathy, infected pancreatic pseudocyst, nutritional support via PEG-J, and persistent right pleural effusion.     Today:  - Transfer to general med/surg  - Video swallow study done, passed but vomited afterwards.  - Plan for CT Chest/ab/pelvis today  - lactulose discontinued  - NPO at midnight for cystogastrectomy tomorrow     # Necrotizing alcoholic pancreatitis w/ infected pseudocyst s/p RP Drain and Cystogastrostomy  # Septic Shock, resolved   # Leukocytosis    Admitted to the [facility] ICU on 6/28/2021 from [facility] in [place] for septic shock due to necrotizing pancreatitis. CT on admit showing mature collection with enhancing wall measuring ~[id number]. IR placed perc drain 6/29. Repeat imaging 7/18 revealed walled off necrotic collection amenable to endoscopic transluminal drainage with cystgastrostomy stenting with necrosectomy on 7/21/21.  7/28 - EGD, stent replacement (transluminal Axios with coaxial Solus), PEG-J removal (buried bumper) and replacement of new PEG-J with one new T-tag. Repeat necrosectomy and Axios removal/replacement Friday 8/6. Repeat CT on 8/9     - Continue Cefepime 1 gm Q 24 + flagyl through endoscopic debridement on 8/6 per GI  - Daily CBC, watching fever curve  - GI following, appreciate recs              -  "Pancreatic enzymes   - Flush drain with 100ml q4hr per GI   - Blood cultures and fluid cultures NGTD    # Secondary Bacterial Peritonitis 2/2 infected pseudocyst  # Peripancreatic fluid collection growing VRE   7/2 - Diagnostic paracentesis (WBC 2663 - 74% PMN, SAAG <1.1, protein 4.1)     Antibiotics:   Current: Cefepime 1 gm Q 24 + flagyl 500 q8 (8/6-present)     Past:  - Linezolid 7/21-7/30  - Vancomcyin 6/29  - Meropenem 6/29-7/18  - Micafungin 6/29-7/1  - Cipro 7/19-7/21  - Ceftriaxone 7/21-7/24; 7/26-7/27  - Zosyn 7/27-8/6    - Paracentesis PRN      # Right hepatic/pancreatic hydrothorax  Intermittently increased subjective shortness of breath. CXR on 7/19 w/ increased RLL haziness; ddx includes pleural effusion v pneumonia v atelectasis. CT on 7/28 showed large volume R hepatic hydrothorax with complete atelectasis of right lung, bedside thora completed 7/28 with labs and cultures.     CXR 7/30 showed moderate R pleural effusion, unchanged from previous and improved aeration of right lung with small remaining pleural effusion on CT. Thora cultures negative. Repeat Thora on 8/9 showing exudative fluid     - IR completed thoracentesis 7/28 - exudative  - ID following: agree to continue with empiric abx, though pleural effusion most likely 2/2 hepatic hydrothorax and pancreatitis. Defer plan for repeat thoracentesis based on symptoms and oxygen requirement. No plan for chest tube given concern for infected pleural effusion  - Supplemental oxygen as needed to keep sats above 89%  - Repeat Thoracentesis PRN for dyspnea    #Hematochezia  Patient CTA-Chest on 8/8 with \"possible\" non-obstructive PE in KALYAN Pulmonary Artery Branch. Patient has largely been bed bound and not on DVT prophylaxis for several days, especially in setting of recent procedures. Troponin negative x2. BRBPR with heparin. Ultrasounds negative for DVT.  - CTA-Chest on 8/10 negative for PE  - Resume DVT prophylaxis 72 hours after next " cysto-gastrectomy (planned for 8/12)    # Lactic acidosis  Multifactorial d/t decreased lactic clearance in the setting of acute hepatic failure, ESRD, and acute on chronic infection.  - Continue to trend and assess vitals     # Gastric Outlet Obstruction  High G tube output due to the cystogastric connection/fluid drainage noted on 8/5. Per GI, possibly related to gastric outlet from severe abdominal inflammation and expected. KUB negative for obstructive gas pattern. Ileus from narcotics considered, but output not consistent with tube feeds  -Improved after cystogastrectomy  -Anti-emetics  -Will monitor and continue to discuss with GI  -Having fluid removal per nephrology due to HD    #Hypokalemia  High GI output state. Replete as needed with ESRD noted. Improved with decreased G-tube drainage after cystogastrectomy.    # Severe malnutrition in setting of chronic illness  PEG-J placed 7/13. Patient intermittently refusing tube feeds d/t nausea/vomiting.  - Dietitian consult for new tube feeds, appreciate recs   - Continuous TF per dietician order (advancing VERY slowly) with goal of 65 ml/hr              - Cont panc enzymes   - Will trial regular diet with small bites. If vomiting, make NPO  - Regular Diet, thin liquids per SLP  - VFSS for 8/10 to evaluate for dysphagia  - Continue multivitamins  - Pantoprazole 40 mg IV daily  - Zofran PRN     # Alcoholic hepatitis complicated by portal hypertension, ascites, and HE - improving   # Hx of Alcohol use disorder c/b withdrawal   # Anemia  # Coagulopathy   Patient with a history of marked alcohol use and alcoholic hepatitis in the past. Was given a course of steroids (1 week) during previous admission at OSH. Patient mental status has been stable, alert and oriented x4 after rifaxamin and aggressive lactulose. Not a liver transplant candidate.      - Lactulose 20g daily and PRN and rifaximin 550mg BID - as patient allows  - Daily folic acid and  thiamine  - Delirium precautions  - Melatonin 6mg at bedtime  - Daily CBC   - Transfuse if hemoglobin < 7, platelet < 10 or < 30 with bleeding  - Continue to monitor for signs of bleeding      # Stage III RADHA on HD due to ATN  # ESRD due to ATN  # Hypervolemia causing acute respiratory failure, resolved  Nephrology note 7/21 indicates that patient has been on renal replacement therapy for two months and pt status has most likely transitioned from ARF to ESRD at this point.          > Hemodialysis as per renal reccs              - Midodrine 10 mg PRN before dialysis sessions     > Daily chemistry labs, strict I/O, and daily weights  - Aspiration precautions  - Supplemental oxygen as needed to keep sats above 89%    #Anemia  Baseline hgb 17. Running between 6-8 in setting of chronic illness, new ESRD, frequent lab draws and procedures.  - Goal hgb >7.0, trend Hgb    # Intermittent lactic acidosis  Suspect due to poor lactate clearance due to ESRD and ESLD + ongoing inflammation/infection. No fluids given due to patient being on dialysis    # Apathy, concern for depression  Patient is intermittently refusing cares due to wanting a 'break,' but also reaffirms that he wants everything done to prolong his life. Mood is depressed, apathetic at times. Patient has multiple stressors including his own medical conditions and family health issues.  - Continue to offer emotional support services such as psychiatry, palliative,   - He declined psychiatry/psychology visits     # Goals of Care  Please see additional interval note for in depth details regarding conversation on 7/9 about goals of care. Patient's long term prognosis is still quite poor given his multi organ failure. Care conference on 7/15, pt did not wish to participate. Met with sisters Noni (on phone) and Melyssa and close friend Leroy with palliative, nephrology and general medicine teams to discuss Dax's current improving status, poor prognosis and wishes  to continue full cares. *   - Hepatology confirmed pt will not be a transplant candidate  - Daily phone updates to sister Noni ([phone])       Diet: Regular Diet Adult  Adult Formula Drip Feeding: Continuous Vital 1.5; Jejunostomy; Goal Rate: 60; mL/hr; Medication - Feeding Tube Flush Frequency: At least 15-30 mL water before and after medication administration and with tube clogging; Amount to Send (Nutrition us...    DVT Prophylaxis: Pneumatic Compression Devices  Rdz Catheter: Not present  Fluids: 10 mL NS TKO  Central Lines: PRESENT  PICC Triple Lumen 06/28/21 Left-Site Assessment: WDL  CVC Double Lumen Right-Site Assessment: WDL  Code Status: Full Code      Disposition Plan   Expected discharge: >7 days, recommended to transitional care unit once  nutrition improved, infected pancreatic pseudocyst is well managed and plan for dialysis is in place.       Johny Ortiz MD  [phone]    [facility]  Securely message with the Vocera Web Console (learn more here)  Text page via [employer] Paging/Directory  Please see sign in/sign out for up to date coverage information  _____________________________________________________________________    Interval History   Ongoing nausea/vomiting. Significant diarrhea. Eating PO and tube feeds running overnight    Data reviewed today: I reviewed all medications, new labs and imaging results over the last 24 hours.    Physical Exam   Vital Signs: Temp: 98.2  F (36.8  C) Temp src: Oral BP: 107/47 Pulse: 118   Resp: 16 SpO2: 93 % O2 Device: None (Room air) Oxygen Delivery: 3 LPM  Weight: 154 lbs 5.15 oz  Constitutional: Awake, non-diaphoretic Ox3  HEENT: Normocephalic. Atraumatic. No scleral icterus   Cardiovascular: tachycardic, tender chest to palpation diffusely  Respiratory: slight tachypnea, decreased breath sounds in right lower lung and rhonchi in right upper lobe  Musculoskeletal: Emaciated, Hammer toe deformity  Skin:  resolving jaundice    Data   Recent Labs   Lab 08/10/21  0631 08/09/21  1312 08/09/21  0326 08/08/21  1435 08/08/21  0509 08/07/21  0637 08/06/21  1002   WBC 16.8* 15.1* 17.4*  --  18.8* 17.5*  --    HGB 7.9* 8.3* 8.0*  --  8.0* 6.9*  --    MCV 87 90 88  --  89 91  --     290 275  --  264 269  --    INR  --   --   --   --  1.74*  --  1.68*   NA  --   --  126*  --  129* 126*  --    POTASSIUM  --   --  3.6  --  3.9 3.9  --    CHLORIDE  --   --  93*  --  97 91*  --    CO2  --   --  24  --  22 22  --    BUN  --   --  19  --  13 27  --    CR  --   --  2.81*  --  2.01* 3.31*  --    ANIONGAP  --   --  9  --  10 13  --    JANENE  --   --  7.8*  --  8.0* 8.4*  --    GLC  --   --  139*  --  128* 68*  --    ALBUMIN  --   --  1.6*  --  1.6* 1.7*  --    PROTTOTAL  --   --  7.1  --  7.0 7.0  --    BILITOTAL  --   --  2.5*  --  2.8* 3.0*  --    ALKPHOS  --   --  121  --  126 113  --    ALT  --   --  22  --  24 20  --    AST  --   --  41  --  51* 46*  --    TROPONIN  --   --   --  <0.015 <0.015  --   --      Recent Results (from the past 24 hour(s))   POC US GUIDE FOR THORACENTESIS    Impression    Rt thorax with pleural effusion prior to thoracentesis (us guided)   US Upper Extremity Venous Duplex Bilat    Narrative    EXAMINATION: DOPPLER VENOUS ULTRASOUND OF BILATERAL UPPER EXTREMITIES,  8/9/2021 2:50 PM     COMPARISON: None.    HISTORY: History of PE, assessing extremities for clot burden    TECHNIQUE:  Gray-scale evaluation with compression, spectral flow, and  color Doppler assessment of the deep venous system of both upper  extremities.    FINDINGS:  Normal blood flow and waveforms are demonstrated in the internal  jugular, innominate, subclavian, and axillary veins bilaterally. There  is normal compressibility of the brachial, basilic and cephalic veins  bilaterally.      Impression    IMPRESSION:  1.  No evidence of deep venous thrombosis in either upper extremity.    I have personally reviewed the examination and  initial interpretation  and I agree with the findings.    MIGUEL WALKER MD         SYSTEM ID:  [id number]   US Lower Extremity Venous Duplex Bilateral    Narrative    EXAMINATION: DOPPLER VENOUS ULTRASOUND OF BILATERAL LOWER EXTREMITIES,  8/9/2021 2:51 PM     COMPARISON: None.    HISTORY: History of PE, assessing extremities for clot burden    TECHNIQUE:  Gray-scale evaluation with compression, spectral flow and  color Doppler assessment of the deep venous system of both legs from  groin to knee, and then at the ankles.    FINDINGS:  In both lower extremities, the common femoral, femoral, popliteal and  posterior tibial veins demonstrate normal compressibility and blood  flow.      Impression    IMPRESSION:  1.  No evidence of deep venous thrombosis in either lower extremity.    I have personally reviewed the examination and initial interpretation  and I agree with the findings.    MIGUEL WALKER MD         SYSTEM ID:  [id number]   XR Chest Port 1 View    Narrative    Portable chest    INDICATION: Right thoracentesis    COMPARISON: 7/22/2021    FINDINGS: Interval decrease in the right pleural effusion. Right IJ  large bore catheter tip is in the proximal right atrium. Left PICC tip  is at the cavoatrial junction. No obvious pneumothorax. Heart size  normal.      Impression    IMPRESSION: Decrease of right pleural effusion postthoracentesis. No  evidence of pneumothorax radiographically.    CHELLE STARKS MD         SYSTEM ID:  [id number]

## 2021-08-10 NOTE — PROGRESS NOTES
GASTROENTEROLOGY PROGRESS NOTE      Date of Admission: 6/28/2021  Reason for Admission: pancreatitis/hepatitis    ASSESSMENT/RECOMMENDATIONS:  30 year old male with a history of heavy alcohol use who presented to OSH 5/19 for abdominal pain, nausea and vomiting, admitted for alcohol hepatitis, ESLD, RADHA on HD, septic shock requiring pressors as well as necrotizing pancreatitis with large evolving necrotic collection. Transferred to [facility] for consideration of drainage of presumed infected necrotic collection. S/p EUS-guided necrosectomy (7/21/21).     #. Acute necrotizing pancreatitis with presumed infected walled off necrotic collection s/p percutaneous and endoscopic drainage   #. Septic shock, resolved  #. Leukocytosis  #. Ascites/Peritonitis  Unclear evolution of pancreatitis history but had a recent CT scan with large evolving necrotic collection, presumed infected. No obvious gas in collection. CT on admission to [facility] showing mature collection with enhancing wall measuring ~[id number]. With profound coagulopathy (liver disease + malnutrition +sepsis), INR 3.8 on transfer he was given FFP and Vit K, IR placed perc drain 6/29. Repeat imaging 7/18 with still large walled off necrotic collection that appeared amenable to endoscopic transluminal drainage, underwent cystgastrostomy stenting on 7/21/21, near complete debridement/necrosectomy 8/6 with plans for final debridement later this week.  Etiology: alcohol  Date of onset: 5/19  BISAP score on admission: unclear  Concurrent organ failure: respiratory (now extubated), renal (on HD), liver, cardiogenic (weaned off pressors 6/29)  Thromboses: ?possible PE 8/8, started on AC but stopped 2/2 GIB  Diabetes: no  Current nutrition access: PEG-J + po diet  Last imaging: CT scan with IV contrast 8/5  Infection/antiinfectives:                 - Meropenem (6/29 - 7/18), Ciprofloxacin (7/19 - 7/21), Vancomycin (6/29 - 6/30), Micafungin (6/29 - 7/1), Ceftriaxone (7/21 -  "7/23), Linezolid (7/21 - 7/30), Zosyn (7/27-8/6), Cefepime (8/6 - present), Metronidazole (8/6 - present)    Intervention:    6/30 - Left IR RP perc drain (24F)    7/2 - Diagnostic paracentesis (WBC 2663 - 74% PMN, SAAG <1.1, protein 4.1)     7/5 - Repeat Paracentesis (WBC 1801 - 61% PMN, lipase 48)    7/13 - Diag/therapeutic paracentesis ()    7/13 - PEG-J placement with T-tag gastropexy    7/21 - Repeat paracentesis    7/21 - EUS-cystgastrostomy and necrosectomy, Axios with co-axial Viabil    7/28 - EGD, stent replacement (transluminal Axios with coaxial Solus), PEG-J removal (buried bumper) and replacement of new PEG-J with one new T-tag    8/6 - EGD with necrosectomy and near complete necrosectomy    -CTA C/A/P w IV contrast today  - Plan to repeat EGD/necrosectomy tomorrow  - Flush perc drain with 100ml q4hr  - PRN paracentesis  - Please continue antibiotics for now until debridements are complete -- would not anticipate a need for prophylactic antibiotics (for SBP) after completion of current course  - pain meds per primary team    #. Hematochezia  #. \"Possible\" non obstructive KALYAN PE  Two episodes of BRBPR 8/8-8/9. Likely related to oozing after necrosectomy in the setting of new anticoagulation for questionable PE (CT Chest 8/8 with concern for PE). Hgb stable. Bleeding stopped after anticoagulation was stopped. Would continue to hold anticoagulation for now. Hemodynamics unchanged. CTA today to r/o pseudoaneurysm.  - Follow H/H  - Hold AC for now    #. Severe malnutrition in the context of chronic illness  #. Gastric outlet obstruction s/p PEG-J placement  #. Mild to moderate EPI  #. Concern for bowel fistula  Patient with very poor oral intake over the course of his illness and was not meeting calorie requirements orally. S/p PEG-J with T-tag gastropexy (x3) 7/13. G tube output has decreased with limited oral intake however now perc drain has high output (2.4L total on 8/9) with what appears to look " "like tube feeds. Concern for bowel fistula to necrotic cavity -- will review on CT scan when completed today.  - G tube to gravity, match I/O as best as possible   - Diet as tolerated, NPO at midnight tonight  - Continue tube feeds with PERT, stop at midnight tonight  - Remove T-tag (placed 7/28) during procedure tomorrow    #. Alcohol hepatitis  #. Hyperbilirubinemia  #. Coagulopathy  #. Encephalopathy, improved  Known heavy alcohol use. Reportedly received course of steroids at OSH for 1 week with \"some improvement\". Currently has primarily hyperbilirubinemia and mildly elevated AST. Unclear if he has underlying cirrhosis but certainly would fit with alcohol hepatitis vs elevated liver tests of critical illness/sepsis. Not candidate for steroids right now. He is receiving lactulose and Xifaxin for encephalopathy. Hepatology notified of admission and they have weighed in on liver transplant status 7/22 (Makayla Glass APRN CNP)  - trend MELD labs  - continue lactulose/Xifaxin and titrate to 3 soft stools per day -- please record stool output  - alcohol abstinence  - hepatology team following peripherally    GI AE will continue to follow, call with questions    The patient was seen and discussed and plan agreed upon with GI staff, Dr. Dann Norwood PA-C  Advanced Endoscopy/Pancreaticobiliary GI Service  [facility]  Text Page  ____________________________________________      Subjective: Nursing notes and 24hr events reviewed. NO new complaints. Patient going down for swallow study this morning. No further e/o bleeding    ROS:   4 pt ROS negative unless noted in subjective.     Objective:  Blood pressure 120/63, pulse 112, temperature 98.2  F (36.8  C), temperature source Oral, resp. rate 18, height 1.676 m (5' 5.98\"), weight 70 kg (154 lb 5.2 oz), SpO2 94 %.  Gen: Laying in bed. Appears comfortable  HEENT: NCAT. Conjunctiva clear. Sclera + mild icteric, poor dentition  Resp: " non labored breathing  Abd: moderate distention, yellow purulent, tube feed appearing output in RP perc drain, GJ tube with tube feeds through J and minimal bilious output in G tube  Skin: + mild jaundice  Ext: warm, well perfused  Mental status/Psych: alert and oriented    LABS:  BMP  Recent Labs   Lab 08/10/21  0631 08/09/21  0326 08/08/21  0509 08/07/21  0637   * 126* 129* 126*   POTASSIUM 3.6 3.6 3.9 3.9   CHLORIDE 91* 93* 97 91*   JANENE 7.6* 7.8* 8.0* 8.4*   CO2 19* 24 22 22   BUN 30 19 13 27   CR 3.81* 2.81* 2.01* 3.31*   * 139* 128* 68*     CBC  Recent Labs   Lab 08/10/21  0631 08/09/21  1312 08/09/21  0326 08/08/21  2241   WBC 16.8* 15.1* 17.4* 18.9*   RBC 2.65* 2.76* 2.70* 2.72*   HGB 7.9* 8.3* 8.0* 8.0*  8.0*   HCT 23.0* 24.8* 23.8* 23.7*   MCV 87 90 88 87   MCH 29.8 30.1 29.6 29.4   MCHC 34.3 33.5 33.6 33.8   RDW 15.9* 16.4* 16.2* 16.1*    290 275 267     INR  Recent Labs   Lab 08/08/21  0509 08/06/21  1002   INR 1.74* 1.68*     LFTs  Recent Labs   Lab 08/10/21  0631 08/09/21  0326 08/08/21  0509 08/07/21  0637   ALKPHOS 110 121 126 113   AST 31 41 51* 46*   ALT 18 22 24 20   BILITOTAL 2.2* 2.5* 2.8* 3.0*   PROTTOTAL 6.7* 7.1 7.0 7.0   ALBUMIN 1.4* 1.6* 1.6* 1.7*      PANC  No lab results found in last 7 days.      IMAGING:  Reviewed most recent imaging

## 2021-08-10 NOTE — PLAN OF CARE
Plan of Care [id number]  Neuro:    - A&Ox4    Cardiac:    - Rhythm: ST   - VSS.     Respiratory:    - SPO2 > 90% on RA.    /GI:   - Last BM:    Diet/appetite:    - Regular and Tube Feeds with intermittent Nausea. PRN Zofran given w/Relief    Activity:     - Up with Lift   - Bed Alarm Active.    Pain:    - At acceptable level on current regimen.     Skin:    - No new skin issues.    LDA's:   - 3 Lumen PICC   - Right HD Port   - Abdominal Drain - To Gravity   - JG Tube - G to [place] (No Output this shift) - J Continuous Tube Feeds    Plan:    - Continue with POC.    - Notify primary team with changes.    - HD, Paracentesis, Video Swallow Today.

## 2021-08-11 ENCOUNTER — ANESTHESIA (OUTPATIENT)
Dept: SURGERY | Facility: CLINIC | Age: 31
End: 2021-08-11
Payer: MEDICAID

## 2021-08-11 ENCOUNTER — APPOINTMENT (OUTPATIENT)
Dept: PHYSICAL THERAPY | Facility: CLINIC | Age: 31
End: 2021-08-11
Attending: INTERNAL MEDICINE
Payer: MEDICAID

## 2021-08-11 ENCOUNTER — ANESTHESIA EVENT (OUTPATIENT)
Dept: SURGERY | Facility: CLINIC | Age: 31
End: 2021-08-11
Payer: MEDICAID

## 2021-08-11 ENCOUNTER — APPOINTMENT (OUTPATIENT)
Dept: GENERAL RADIOLOGY | Facility: CLINIC | Age: 31
End: 2021-08-11
Attending: STUDENT IN AN ORGANIZED HEALTH CARE EDUCATION/TRAINING PROGRAM
Payer: MEDICAID

## 2021-08-11 LAB
ALBUMIN SERPL-MCNC: 1.6 G/DL (ref 3.4–5)
ALP SERPL-CCNC: 109 U/L (ref 40–150)
ALT SERPL W P-5'-P-CCNC: 16 U/L (ref 0–70)
ANION GAP SERPL CALCULATED.3IONS-SCNC: 9 MMOL/L (ref 3–14)
AST SERPL W P-5'-P-CCNC: 26 U/L (ref 0–45)
BILIRUB SERPL-MCNC: 2.2 MG/DL (ref 0.2–1.3)
BUN SERPL-MCNC: 12 MG/DL (ref 7–30)
CALCIUM SERPL-MCNC: 8 MG/DL (ref 8.5–10.1)
CHLORIDE BLD-SCNC: 97 MMOL/L (ref 94–109)
CO2 SERPL-SCNC: 22 MMOL/L (ref 20–32)
CREAT SERPL-MCNC: 2.14 MG/DL (ref 0.66–1.25)
ERYTHROCYTE [DISTWIDTH] IN BLOOD BY AUTOMATED COUNT: 16.4 % (ref 10–15)
GFR SERPL CREATININE-BSD FRML MDRD: 40 ML/MIN/1.73M2
GLUCOSE BLD-MCNC: 85 MG/DL (ref 70–99)
GLUCOSE BLDC GLUCOMTR-MCNC: 94 MG/DL (ref 70–99)
HCT VFR BLD AUTO: 23.9 % (ref 40–53)
HGB BLD-MCNC: 8.2 G/DL (ref 13.3–17.7)
MAGNESIUM SERPL-MCNC: 1.7 MG/DL (ref 1.6–2.3)
MCH RBC QN AUTO: 29.9 PG (ref 26.5–33)
MCHC RBC AUTO-ENTMCNC: 34.3 G/DL (ref 31.5–36.5)
MCV RBC AUTO: 87 FL (ref 78–100)
PHOSPHATE SERPL-MCNC: 3.2 MG/DL (ref 2.5–4.5)
PLATELET # BLD AUTO: 274 10E3/UL (ref 150–450)
POTASSIUM BLD-SCNC: 3.7 MMOL/L (ref 3.4–5.3)
PROT FLD-MCNC: 5.2 G/DL
PROT SERPL-MCNC: 6.9 G/DL (ref 6.8–8.8)
RBC # BLD AUTO: 2.74 10E6/UL (ref 4.4–5.9)
SODIUM SERPL-SCNC: 128 MMOL/L (ref 133–144)
WBC # BLD AUTO: 17 10E3/UL (ref 4–11)

## 2021-08-11 PROCEDURE — 36592 COLLECT BLOOD FROM PICC: CPT | Performed by: INTERNAL MEDICINE

## 2021-08-11 PROCEDURE — 250N000013 HC RX MED GY IP 250 OP 250 PS 637: Performed by: INTERNAL MEDICINE

## 2021-08-11 PROCEDURE — 370N000017 HC ANESTHESIA TECHNICAL FEE, PER MIN: Performed by: INTERNAL MEDICINE

## 2021-08-11 PROCEDURE — 99233 SBSQ HOSP IP/OBS HIGH 50: CPT | Mod: GC | Performed by: INTERNAL MEDICINE

## 2021-08-11 PROCEDURE — 0FBG8ZZ EXCISION OF PANCREAS, VIA NATURAL OR ARTIFICIAL OPENING ENDOSCOPIC: ICD-10-PCS | Performed by: INTERNAL MEDICINE

## 2021-08-11 PROCEDURE — 83735 ASSAY OF MAGNESIUM: CPT | Performed by: INTERNAL MEDICINE

## 2021-08-11 PROCEDURE — P9041 ALBUMIN (HUMAN),5%, 50ML: HCPCS | Performed by: NURSE ANESTHETIST, CERTIFIED REGISTERED

## 2021-08-11 PROCEDURE — 258N000003 HC RX IP 258 OP 636: Performed by: ANESTHESIOLOGY

## 2021-08-11 PROCEDURE — 250N000009 HC RX 250: Performed by: INTERNAL MEDICINE

## 2021-08-11 PROCEDURE — 999N000179 XR SURGERY CARM FLUORO LESS THAN 5 MIN W STILLS

## 2021-08-11 PROCEDURE — 250N000009 HC RX 250: Performed by: NURSE ANESTHETIST, CERTIFIED REGISTERED

## 2021-08-11 PROCEDURE — 97110 THERAPEUTIC EXERCISES: CPT | Mod: GP

## 2021-08-11 PROCEDURE — 710N000011 HC RECOVERY PHASE 1, LEVEL 3, PER MIN: Performed by: INTERNAL MEDICINE

## 2021-08-11 PROCEDURE — C1876 STENT, NON-COA/NON-COV W/DEL: HCPCS | Performed by: INTERNAL MEDICINE

## 2021-08-11 PROCEDURE — C1726 CATH, BAL DIL, NON-VASCULAR: HCPCS | Performed by: INTERNAL MEDICINE

## 2021-08-11 PROCEDURE — 250N000011 HC RX IP 250 OP 636: Performed by: INTERNAL MEDICINE

## 2021-08-11 PROCEDURE — 97530 THERAPEUTIC ACTIVITIES: CPT | Mod: GP

## 2021-08-11 PROCEDURE — 99233 SBSQ HOSP IP/OBS HIGH 50: CPT | Mod: GC | Performed by: STUDENT IN AN ORGANIZED HEALTH CARE EDUCATION/TRAINING PROGRAM

## 2021-08-11 PROCEDURE — 250N000009 HC RX 250

## 2021-08-11 PROCEDURE — 272N000002 HC OR SUPPLY OTHER OPNP: Performed by: INTERNAL MEDICINE

## 2021-08-11 PROCEDURE — 120N000005 HC R&B MS OVERFLOW UMMC

## 2021-08-11 PROCEDURE — 272N000001 HC OR GENERAL SUPPLY STERILE: Performed by: INTERNAL MEDICINE

## 2021-08-11 PROCEDURE — 250N000011 HC RX IP 250 OP 636: Performed by: NURSE ANESTHETIST, CERTIFIED REGISTERED

## 2021-08-11 PROCEDURE — 255N000002 HC RX 255 OP 636: Performed by: INTERNAL MEDICINE

## 2021-08-11 PROCEDURE — 0FPD80Z REMOVAL OF DRAINAGE DEVICE FROM PANCREATIC DUCT, VIA NATURAL OR ARTIFICIAL OPENING ENDOSCOPIC: ICD-10-PCS | Performed by: INTERNAL MEDICINE

## 2021-08-11 PROCEDURE — 360N000082 HC SURGERY LEVEL 2 W/ FLUORO, PER MIN: Performed by: INTERNAL MEDICINE

## 2021-08-11 PROCEDURE — 84100 ASSAY OF PHOSPHORUS: CPT | Performed by: INTERNAL MEDICINE

## 2021-08-11 PROCEDURE — 258N000003 HC RX IP 258 OP 636: Performed by: NURSE ANESTHETIST, CERTIFIED REGISTERED

## 2021-08-11 PROCEDURE — 999N000141 HC STATISTIC PRE-PROCEDURE NURSING ASSESSMENT: Performed by: INTERNAL MEDICINE

## 2021-08-11 PROCEDURE — 250N000024 HC ISOFLURANE, PER MIN: Performed by: INTERNAL MEDICINE

## 2021-08-11 PROCEDURE — 80053 COMPREHEN METABOLIC PANEL: CPT | Performed by: INTERNAL MEDICINE

## 2021-08-11 PROCEDURE — 250N000011 HC RX IP 250 OP 636: Performed by: STUDENT IN AN ORGANIZED HEALTH CARE EDUCATION/TRAINING PROGRAM

## 2021-08-11 PROCEDURE — 0F9G8ZZ DRAINAGE OF PANCREAS, VIA NATURAL OR ARTIFICIAL OPENING ENDOSCOPIC: ICD-10-PCS | Performed by: INTERNAL MEDICINE

## 2021-08-11 PROCEDURE — 85014 HEMATOCRIT: CPT | Performed by: INTERNAL MEDICINE

## 2021-08-11 RX ORDER — IOPAMIDOL 510 MG/ML
INJECTION, SOLUTION INTRAVASCULAR PRN
Status: DISCONTINUED | OUTPATIENT
Start: 2021-08-11 | End: 2021-08-11 | Stop reason: HOSPADM

## 2021-08-11 RX ORDER — ONDANSETRON 4 MG/1
4 TABLET, ORALLY DISINTEGRATING ORAL EVERY 30 MIN PRN
Status: CANCELLED | OUTPATIENT
Start: 2021-08-11

## 2021-08-11 RX ORDER — SODIUM CHLORIDE, SODIUM LACTATE, POTASSIUM CHLORIDE, CALCIUM CHLORIDE 600; 310; 30; 20 MG/100ML; MG/100ML; MG/100ML; MG/100ML
INJECTION, SOLUTION INTRAVENOUS CONTINUOUS
Status: DISCONTINUED | OUTPATIENT
Start: 2021-08-11 | End: 2021-08-11 | Stop reason: HOSPADM

## 2021-08-11 RX ORDER — FENTANYL CITRATE 50 UG/ML
25 INJECTION, SOLUTION INTRAMUSCULAR; INTRAVENOUS EVERY 5 MIN PRN
Status: CANCELLED | OUTPATIENT
Start: 2021-08-11

## 2021-08-11 RX ORDER — LIDOCAINE 40 MG/G
CREAM TOPICAL
Status: DISCONTINUED | OUTPATIENT
Start: 2021-08-11 | End: 2021-08-11 | Stop reason: HOSPADM

## 2021-08-11 RX ORDER — ONDANSETRON 2 MG/ML
4 INJECTION INTRAMUSCULAR; INTRAVENOUS EVERY 30 MIN PRN
Status: CANCELLED | OUTPATIENT
Start: 2021-08-11

## 2021-08-11 RX ORDER — ALBUMIN, HUMAN INJ 5% 5 %
SOLUTION INTRAVENOUS CONTINUOUS PRN
Status: DISCONTINUED | OUTPATIENT
Start: 2021-08-11 | End: 2021-08-11

## 2021-08-11 RX ORDER — PROPOFOL 10 MG/ML
INJECTION, EMULSION INTRAVENOUS PRN
Status: DISCONTINUED | OUTPATIENT
Start: 2021-08-11 | End: 2021-08-11

## 2021-08-11 RX ORDER — MAGNESIUM HYDROXIDE 1200 MG/15ML
LIQUID ORAL
Status: COMPLETED
Start: 2021-08-11 | End: 2021-08-11

## 2021-08-11 RX ORDER — SODIUM CHLORIDE, SODIUM LACTATE, POTASSIUM CHLORIDE, CALCIUM CHLORIDE 600; 310; 30; 20 MG/100ML; MG/100ML; MG/100ML; MG/100ML
INJECTION, SOLUTION INTRAVENOUS CONTINUOUS
Status: CANCELLED | OUTPATIENT
Start: 2021-08-11

## 2021-08-11 RX ORDER — LIDOCAINE HYDROCHLORIDE 20 MG/ML
INJECTION, SOLUTION INFILTRATION; PERINEURAL PRN
Status: DISCONTINUED | OUTPATIENT
Start: 2021-08-11 | End: 2021-08-11

## 2021-08-11 RX ORDER — DEXAMETHASONE SODIUM PHOSPHATE 4 MG/ML
INJECTION, SOLUTION INTRA-ARTICULAR; INTRALESIONAL; INTRAMUSCULAR; INTRAVENOUS; SOFT TISSUE PRN
Status: DISCONTINUED | OUTPATIENT
Start: 2021-08-11 | End: 2021-08-11

## 2021-08-11 RX ORDER — ONDANSETRON 2 MG/ML
INJECTION INTRAMUSCULAR; INTRAVENOUS PRN
Status: DISCONTINUED | OUTPATIENT
Start: 2021-08-11 | End: 2021-08-11

## 2021-08-11 RX ORDER — FENTANYL CITRATE 50 UG/ML
INJECTION, SOLUTION INTRAMUSCULAR; INTRAVENOUS PRN
Status: DISCONTINUED | OUTPATIENT
Start: 2021-08-11 | End: 2021-08-11

## 2021-08-11 RX ADMIN — PHENYLEPHRINE HYDROCHLORIDE 100 MCG: 10 INJECTION INTRAVENOUS at 09:38

## 2021-08-11 RX ADMIN — SODIUM BICARBONATE 325 MG: 325 TABLET ORAL at 13:35

## 2021-08-11 RX ADMIN — THIAMINE HYDROCHLORIDE 100 MG: 100 INJECTION, SOLUTION INTRAMUSCULAR; INTRAVENOUS at 13:36

## 2021-08-11 RX ADMIN — HYDROMORPHONE HYDROCHLORIDE 1 MG: 1 SOLUTION ORAL at 13:36

## 2021-08-11 RX ADMIN — PHENYLEPHRINE HYDROCHLORIDE 100 MCG: 10 INJECTION INTRAVENOUS at 09:35

## 2021-08-11 RX ADMIN — LIDOCAINE HYDROCHLORIDE 100 MG: 20 INJECTION, SOLUTION INFILTRATION; PERINEURAL at 08:54

## 2021-08-11 RX ADMIN — PANCRELIPASE 2 CAPSULE: 24000; 76000; 120000 CAPSULE, DELAYED RELEASE PELLETS ORAL at 20:04

## 2021-08-11 RX ADMIN — SUGAMMADEX 200 MG: 100 INJECTION, SOLUTION INTRAVENOUS at 11:36

## 2021-08-11 RX ADMIN — SODIUM BICARBONATE 325 MG: 325 TABLET ORAL at 16:11

## 2021-08-11 RX ADMIN — PHENYLEPHRINE HYDROCHLORIDE 150 MCG: 10 INJECTION INTRAVENOUS at 09:23

## 2021-08-11 RX ADMIN — Medication 5 ML: at 13:36

## 2021-08-11 RX ADMIN — HYDROXYZINE HYDROCHLORIDE 50 MG: 25 TABLET, FILM COATED ORAL at 21:49

## 2021-08-11 RX ADMIN — ALBUMIN (HUMAN): 12.5 SOLUTION INTRAVENOUS at 09:20

## 2021-08-11 RX ADMIN — PHENYLEPHRINE HYDROCHLORIDE 100 MCG: 10 INJECTION INTRAVENOUS at 09:12

## 2021-08-11 RX ADMIN — FOLIC ACID 1 MG: 1 TABLET ORAL at 13:36

## 2021-08-11 RX ADMIN — DEXAMETHASONE SODIUM PHOSPHATE 4 MG: 4 INJECTION, SOLUTION INTRA-ARTICULAR; INTRALESIONAL; INTRAMUSCULAR; INTRAVENOUS; SOFT TISSUE at 09:38

## 2021-08-11 RX ADMIN — SODIUM BICARBONATE 325 MG: 325 TABLET ORAL at 20:03

## 2021-08-11 RX ADMIN — PANCRELIPASE 2 CAPSULE: 24000; 76000; 120000 CAPSULE, DELAYED RELEASE PELLETS ORAL at 16:11

## 2021-08-11 RX ADMIN — HYDROMORPHONE HYDROCHLORIDE 1 MG: 1 SOLUTION ORAL at 21:49

## 2021-08-11 RX ADMIN — ONDANSETRON 4 MG: 2 INJECTION INTRAMUSCULAR; INTRAVENOUS at 11:22

## 2021-08-11 RX ADMIN — PANCRELIPASE 2 CAPSULE: 24000; 76000; 120000 CAPSULE, DELAYED RELEASE PELLETS ORAL at 18:09

## 2021-08-11 RX ADMIN — SODIUM CHLORIDE 100 ML: 900 IRRIGANT IRRIGATION at 20:30

## 2021-08-11 RX ADMIN — METRONIDAZOLE 500 MG: 500 INJECTION, SOLUTION INTRAVENOUS at 18:10

## 2021-08-11 RX ADMIN — MELATONIN TAB 3 MG 6 MG: 3 TAB at 21:49

## 2021-08-11 RX ADMIN — PHENYLEPHRINE HYDROCHLORIDE 100 MCG: 10 INJECTION INTRAVENOUS at 09:05

## 2021-08-11 RX ADMIN — SODIUM CHLORIDE, POTASSIUM CHLORIDE, SODIUM LACTATE AND CALCIUM CHLORIDE: 600; 310; 30; 20 INJECTION, SOLUTION INTRAVENOUS at 08:45

## 2021-08-11 RX ADMIN — FENTANYL CITRATE 50 MCG: 50 INJECTION, SOLUTION INTRAMUSCULAR; INTRAVENOUS at 10:27

## 2021-08-11 RX ADMIN — HYDROMORPHONE HYDROCHLORIDE 2 MG: 1 SOLUTION ORAL at 16:12

## 2021-08-11 RX ADMIN — CEFEPIME HYDROCHLORIDE 1 G: 1 INJECTION, POWDER, FOR SOLUTION INTRAMUSCULAR; INTRAVENOUS at 20:24

## 2021-08-11 RX ADMIN — ROCURONIUM BROMIDE 40 MG: 10 INJECTION INTRAVENOUS at 08:54

## 2021-08-11 RX ADMIN — PHENYLEPHRINE HYDROCHLORIDE 0.1 MCG/KG/MIN: 10 INJECTION INTRAVENOUS at 09:35

## 2021-08-11 RX ADMIN — PHENYLEPHRINE HYDROCHLORIDE 200 MCG: 10 INJECTION INTRAVENOUS at 09:16

## 2021-08-11 RX ADMIN — FENTANYL CITRATE 50 MCG: 50 INJECTION, SOLUTION INTRAMUSCULAR; INTRAVENOUS at 11:54

## 2021-08-11 RX ADMIN — FENTANYL CITRATE 50 MCG: 50 INJECTION, SOLUTION INTRAMUSCULAR; INTRAVENOUS at 09:27

## 2021-08-11 RX ADMIN — PANCRELIPASE 2 CAPSULE: 24000; 76000; 120000 CAPSULE, DELAYED RELEASE PELLETS ORAL at 13:36

## 2021-08-11 RX ADMIN — ROCURONIUM BROMIDE 20 MG: 10 INJECTION INTRAVENOUS at 09:24

## 2021-08-11 RX ADMIN — PHENYLEPHRINE HYDROCHLORIDE 100 MCG: 10 INJECTION INTRAVENOUS at 09:09

## 2021-08-11 RX ADMIN — HYDROXYZINE HYDROCHLORIDE 50 MG: 25 TABLET, FILM COATED ORAL at 14:48

## 2021-08-11 RX ADMIN — PHENYLEPHRINE HYDROCHLORIDE 150 MCG: 10 INJECTION INTRAVENOUS at 09:30

## 2021-08-11 RX ADMIN — Medication 550 MG: at 20:03

## 2021-08-11 RX ADMIN — PROPOFOL 140 MG: 10 INJECTION, EMULSION INTRAVENOUS at 08:54

## 2021-08-11 RX ADMIN — FENTANYL CITRATE 50 MCG: 50 INJECTION, SOLUTION INTRAMUSCULAR; INTRAVENOUS at 10:45

## 2021-08-11 RX ADMIN — ONDANSETRON 4 MG: 2 INJECTION INTRAMUSCULAR; INTRAVENOUS at 14:48

## 2021-08-11 RX ADMIN — METRONIDAZOLE 500 MG: 500 INJECTION, SOLUTION INTRAVENOUS at 05:48

## 2021-08-11 RX ADMIN — HYDROMORPHONE HYDROCHLORIDE 1 MG: 1 SOLUTION ORAL at 03:58

## 2021-08-11 RX ADMIN — ROCURONIUM BROMIDE 10 MG: 10 INJECTION INTRAVENOUS at 10:52

## 2021-08-11 ASSESSMENT — ACTIVITIES OF DAILY LIVING (ADL)
ADLS_ACUITY_SCORE: 17
ADLS_ACUITY_SCORE: 18
ADLS_ACUITY_SCORE: 18
ADLS_ACUITY_SCORE: 17

## 2021-08-11 ASSESSMENT — MIFFLIN-ST. JEOR: SCORE: 1592.5

## 2021-08-11 ASSESSMENT — LIFESTYLE VARIABLES: TOBACCO_USE: 1

## 2021-08-11 NOTE — PROGRESS NOTES
"Hematology Consult    Date of Service 08/11/2021  Admit Date 6/28/2021   Initial Consult 08/09/21  Hospital Day: 45     Reason for consult: pulmonary embolism, GI bleed    Interval History  No further bleeding. Got necrosectomy today. Continues off anticoagulation. No shortness of breath or chest pain or leg swelling.     Physical Exam  BP 98/40   Pulse 110   Temp 97.7  F (36.5  C) (Oral)   Resp 16   Ht 1.676 m (5' 5.98\")   Wt 69.5 kg (153 lb 3.5 oz)   SpO2 95%   BMI 24.74 kg/m       Constitutional: Awake, alert, cooperative, thin  Eyes: PERRL, EOMI, sclera clear, conjunctiva normal, hoarse voice  ENT: Normocephalic, without obvious abnormality, oral pharynx with moist mucus membranes  Respiratory: Non-labored breathing, good air exchange, decreased on the right  Cardiovascular: RRR, no murmur noted.  GI: + bowel sounds, soft, slightly distended, non-tender, no masses palpated  Skin: No concerning lesions or rash on exposed areas. No significant bruising.  Musculoskeletal: No edema luisito LEs.  Neurologic: Awake, alert & oriented x3.  Cranial nerves II-XII are grossly intact.   Psych: appropriate affect        Recent Labs   Lab 08/11/21  0555 08/09/21  0326   POTASSIUM 3.7 3.6   CR 2.14* 2.81*   JANENE 8.0* 7.8*   MAG 1.7 2.3   PHOS 3.2 5.2*   LDH  --  191     Recent Labs   Lab 08/11/21  0555 08/10/21  0631 08/09/21  0326 08/08/21  0509   AST 26 31 41 51*   ALT 16 18 22 24   ALKPHOS 109 110 121 126   BILITOTAL 2.2* 2.2* 2.5* 2.8*       Recent Labs   Lab 08/11/21  0555 08/10/21  0631 08/09/21  1312   WBC 17.0* 16.8* 15.1*   HGB 8.2* 7.9* 8.3*   HCT 23.9* 23.0* 24.8*   MCV 87 87 90    298 290   ALYM  --   --  0.6*     No results for input(s): IRON, IRONSAT, RETICABSCT, RETP, FEB, JERRELL, B12, FOLIC, EPOE, MORPH in the last 168 hours.    Impression:  1. Possible pulmonary embolism on CT 8/8/21, unclear time course, but likely provoked in the setting of hospitalization and ongoing illness. BLE neg for DVT.  2. " Gastrointestinal tract bleed on heparin, now s/p repeat necrosectomy, stent exchange 8/11    Recommendations:  1. No indication for IVC filter at this time especially with no lower extremity DVTs  2. Continue PCDs  3. When stable x 1 week start prophylactic dose anticoagulation and monitor for bleeding for a few days, then step up to unfractionated heparin gtt, likely will need 3 months of anticoagulation, exact agent depending on degree of organ recovery when getting ready for discharge. Please contact us when getting ready to transition to oral anticoagulation.    Thank you for allowing us to participate in this patient's care. Please do not hesitate to contact us if there are any further questions or concerns.     Discussed with staff Dr. Russell.    Anuel Bridges MD  Hematology/Oncology Fellow  Attending  The patient was seen and examined by me separate from the resident/fellow provider.The note above reflects my assessment and plan. I have personally reviewed today's labs,vital and radiology results. The points of care that were added by me are:    Agree with plan regarding heparin.Will follow for bleeding  Teofilo Russell M.D.  [phone]

## 2021-08-11 NOTE — BRIEF OP NOTE
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  Surgeon: Surgeon(s) and Role:     * Amadou Beasley MD - Primary  Anesthesia: General   Estimated blood loss: Less than 10 ml  Drains: Existing drains left in place  Specimens: * No specimens in log *  Findings:   Sinogram via existing left flank drain showed solid necrosis in cavity, expected filling of stomach and no apparent fistula to small bowel.    Existing Solus stents removed and small clip placed at cystgastrostomy site for localization. Ostomy dilated to 18 mm and cavity entered.  Extensive debridement of solid necrosis performed in right side of cavity, with eventual apparent near complete/sufficient debridement.   Two 10 x 1 cm Solus stents replaced across cavity.    The single remaining T-fastener at PEG site was removed.  Fluoro showed no free gas, GJ in good position.    Complications: None.  Implants:   Implant Name Type Inv. Item Serial No.  Lot No. LRB No. Used Action   STENT SOLUS BILIARY [id number] DBL PIGTAIL W/INTRO [id number] - [id number] Stent STENT SOLUS BILIARY [id number] DBL PIGTAIL W/INTRO [id number]  [employer] [id number] N/A 1 Explanted   STENT SOLUS BILIARY [id number] DBL PIGTAIL W/INTRO [id number] - [id number] Stent STENT SOLUS BILIARY [id number] DBL PIGTAIL W/INTRO [id number]  [employer] [id number] N/A 1 Explanted   STENT SOLUS BILIARY [id number] DBL PIGTAIL W/INTRO [id number] - [id number] Stent STENT SOLUS BILIARY [id number] DBL PIGTAIL W/INTRO [id number]  [employer] [id number] N/A 1 Implanted   STENT SOLUS BILIARY [id number] DBL PIGTAIL W/INTRO [id number] - [id number] Stent STENT SOLUS BILIARY [id number] DBL PIGTAIL W/INTRO [id number]  [employer] [id number] N/A 1 Implanted        Plan = return to floor, resume prior diet.  No plan for further debridements - prn fevers or pain.  Will discuss with IR re downsizing of perc drain and possible capping trial.    JENISE Beasley MD  Professor of Medicine  Division of Gastroenterology, Hepatology and Nutrition  [facility]

## 2021-08-11 NOTE — PROGRESS NOTES
CLINICAL NUTRITION SERVICES - REASSESSMENT NOTE     Nutrition Prescription    RECOMMENDATIONS FOR MDs/PROVIDERS TO ORDER:  Diet advancement as medically able and as patient tolerates     Malnutrition Status:    Severe malnutrition in the context of chronic illness     Recommendations already ordered by Registered Dietitian (RD):  Continue enteral nutrition:   Nutrition Support: Vital 1.5 Leno @ goal of  60ml/hr  (1440ml/day)  will provide: 2160 kcals (32), 97 g PRO (1.4), 1100 ml free H20, 269 g CHO, and 8 g fiber daily. 82 g fat  -Continue creon- within therapeutic range    Future/Additional Recommendations:  Monitor ability to achieve goal enteral nutrition volume, tolerance/ability to take PO intake   Monitor renal function- need for formula change      EVALUATION OF THE PROGRESS TOWARD GOALS   Diet: NPO (8/11)  Nutrition Support: Vital 1.5 Leno @ goal of  60ml/hr  (1440ml/day)  will provide: 2160 kcals (32), 97 g PRO (1.4), 1100 ml free H20, 269 g CHO, and 8 g fiber daily. 82 g fat   Access: GJ- jejunostomy for TF   FWF: 60 mL q 4 hours (8/6) + 30 mL q 4 hours (7/13)   Enzymes: Creon 24, 2 capsules q 4 hours with TF providing 3512 units lipase/g fat     Intake: Average enteral nutrition received 8/4-8/10 provided 999 mL/day (69% goal volume), 1499 kcal (22 kcal/kg) and 63 g protein (0.9 g/kg)     NEW FINDINGS   Weight Trends:  08/11/21 0410 69.5 kg (153 lb 3.5 oz)   08/10/21 0400 70 kg (154 lb 5.2 oz)   08/09/21 0700 75.5 kg (166 lb 7.2 oz)   08/08/21 0300 71 kg (156 lb 8.4 oz)   08/07/21 1645 69.5 kg (153 lb 3.5 oz)   08/07/21 0406 70.5 kg (155 lb 6.8 oz)   08/06/21 0620 70 kg (154 lb 5.2 oz)   08/05/21 0142 67.5 kg (148 lb 13 oz)   08/04/21 0636 70 kg (154 lb 5.2 oz)   06/28/21 1815  80.2 kg (176 lb 12.9 oz)     GI: EGD today with necrosectomy/stent exchange. Last bowel movement, 8/10, 2 stools documented. Banreina ordered 8/10  CT chest/abdomen 8/10 with comparisons 8/8, 8/5- Decreased size of walled off  necrotic collections in the upper abdomen with unchanged position of drains.  SLP: swallow study 8/10, regular diet with thin liquids +supervision   Renal:  Nephrology following. Ultrafiltration done 8/10 1600 mL removed.   Skin: Jaundiced, WOCN last assessed 7/29.   Labs: Na 128 (L), Creatinine 2.14 (H)  Medications: Sodium Bicarbonate/Creon, creon with meals, nephronex, folic acid, Protonix, Thiamine    MALNUTRITION  % Intake: < 75% for > 7 days (non-severe)  % Weight Loss: > 5% in 1 month (severe)  Subcutaneous Fat Loss: Severe global loss (noted per visual exam as patient in room, busy with other providers)    Muscle Loss: severe global loss (noted per visual exam as patient in room busy with other providers)  Fluid Accumulation/Edema: does not meet criteria   Malnutrition Diagnosis: Severe malnutrition in the context of acute on chronic illness     Previous Goals   Total avg nutritional intake to meet a minimum of 25 kcal/kg and 1.3 g PRO/kg daily (per dosing wt 67.5 kg).  Evaluation: Not met    Previous Nutrition Diagnosis  Inadequate protein-energy intake related to inadequate enteral nutrition infusion and inability to take adequate PO as evidenced by average enteral nutrition received meeting 21% and 19% of energy needs and current NPO status   Evaluation: Improving    CURRENT NUTRITION DIAGNOSIS  Inadequate protein-energy/ intake related to inadequate enteral nutrition infusion and inadequate oral intake as evidenced by enteral nutrition providing 62% of low end protein and 74% of low end energy needs and     INTERVENTIONS  Implementation  Collaboration with other providers  Enteral Nutrition - continue    Goals  Total avg nutritional intake to meet a minimum of 25 kcal/kg and 1.3 g PRO/kg daily (per dosing wt 67.5 kg).    Monitoring/Evaluation  Progress toward goals will be monitored and evaluated per protocol.    Jennifer Castillo RD, MARYELLEN  6B pager: [phone]

## 2021-08-11 NOTE — CODE/RAPID RESPONSE
Rapid Response Team Note    Assessment   In assessment a rapid response was called on Dax Villareal due to lactic acidosis. This presentation is likely due to decompensated cirrhosis, pancreatitis and sepsis and worsened by RADHA on dialysis.    Patient had HD today with 1600mL removed. He is tachycardic (not new) to 120s at rest with BP last 106/52.    Plan   -  Albumin 25% 12.5g (total 50mL)  -  The [facility] Internal Medicine primary team was paged and will await call-back.  -  Disposition: The patient will remain on the current unit. We will continue to monitor this patient closely.  -  Reassessment and plan follow-up will be performed by the primary team    SOFIA Peraza CNP  [facility] [place] RRT AMCOM Job Code Contact #[phone]    Hospital Course   Brief Summary of events leading to rapid response:   Patient's vital signs and WBC ct triggered sepsis protocol to check lactic acid. Elevated WBC 16.8 and HR 120s with max 130 in 24 hr. Afebrile. On antibiotics for necrotizing pancreatitis. Antibiotics reviewed and are appropriate    Admission Diagnosis:   Pancreatitis [K85.90]     Physical Exam   Temp: 98.5  F (36.9  C) Temp  Min: 98  F (36.7  C)  Max: 98.5  F (36.9  C)  Resp: 18 Resp  Min: 12  Max: 38  SpO2: 99 % SpO2  Min: 93 %  Max: 100 %  Pulse: (!) 124 Pulse  Min: 109  Max: 130    No data recorded  BP: 106/52 Systolic (24hrs), Av , Min:78 , Max:123   Diastolic (24hrs), Av, Min:43, Max:65     I/Os: I/O last 3 completed shifts:  In: 2200 [I.V.:30; Other:400; NG/GT:330]  Out: 1275 [Drains:1275]     Exam:   General: in no acute distress, not in acute distress  Mental Status: baseline mental status.  Resp: LS CTAB upper lobes. Lower lobes not auscultated on exam  CV: regular rhythm. Tachycardia. No murmur was auscultated  Abd: ascites present. soft    Significant Results and Procedures   Lactic Acid:   Recent Labs   Lab Test 08/10/21  1954 21  1629 21  1820 21  2215 21  2116  07/07/21  2106 07/06/21  2106   LACT 2.3* 1.5 1.5   < >  --   --   --    LACTS  --   --   --   --  1.1 0.9 1.5    < > = values in this interval not displayed.     CBC:   Recent Labs   Lab Test 08/10/21  0631 08/09/21  1312 08/09/21  0326   WBC 16.8* 15.1* 17.4*   HGB 7.9* 8.3* 8.0*   HCT 23.0* 24.8* 23.8*    290 275        Sepsis Evaluation   The patient is known to have an infection.  Dax Villareal meets SIRS criteria AND has a lactate >2 or other evidence of acute organ damage.  These vital signs, lab and physical exam findings constitute a diagnosis of SEVERE SEPSIS.    Sepsis Time-Zero (time severe sepsis diagnosis confirmed): present on admission     Anti-infectives (From now, onward)    Start     Dose/Rate Route Frequency Ordered Stop    08/08/21 2000  ceFEPIme (MAXIPIME) 1g vial to attach to  ml bag for ADULTS or NS 50 ml bag for PEDS      1 g  over 30 Minutes Intravenous EVERY 24 HOURS 08/08/21 0940      08/06/21 1830  metroNIDAZOLE (FLAGYL) infusion 500 mg      500 mg  over 60 Minutes Intravenous EVERY 12 HOURS 08/06/21 1824      07/05/21 0830  rifaximin (XIFAXAN) oral suspension 550 mg      550 mg Oral 2 TIMES DAILY 07/05/21 0757          Current antibiotic coverage is appropriate for source of infection.    3 Hour Severe Sepsis Bundle Completion:  1. Initial Lactic Acid result shown above. Repeat lactic acid is not indicated.   2. Blood Cultures before Antibiotics: No, antibiotics were started prior to BCx collection b/c waiting for BCx to be collected would have been detrimental to the patient  3. Broad Spectrum Antibiotics Administered: yes  4. Fluids: Fluid bolus not indicated due to: decompensated cirrhosis on dialysis. Albumin ordered in lieu of crystalloid

## 2021-08-11 NOTE — PROGRESS NOTES
[facility]  Progress Note - Daniela 3 Service        Date of Admission:  6/28/2021    Assessment & Plan   Dax Villareal is a 30 year old male with hx of alcohol use disorder admitted on 6/28/2021 after recent prolonged admission at [facility] in [place] who has severe acute alcoholic hepatitis c/b HE, ESRD requiring HD, and malnutrition in the setting of acute necrotizing pancreatitis, s/p PEG-J placement on 7/13. He arrived with acute respiratory failure and septic shock that have resolved, and his secondary bacterial peritonitis has been appropriately treated. He requires continued management for ESRD, hepatic encephalopathy, infected pancreatic pseudocyst, nutritional support via PEG-J, and persistent right pleural effusion.     Today:  - Underwent necrosectomy w/ stent exchange     # Necrotizing alcoholic pancreatitis w/ infected pseudocyst s/p RP Drain and Cystogastrostomy  # Septic Shock, resolved   # Leukocytosis    Admitted to the [facility] ICU on 6/28/2021 from [facility] in [place] for septic shock due to necrotizing pancreatitis. CT on admit showing mature collection with enhancing wall measuring ~[id number]. IR placed perc drain 6/29. Repeat imaging 7/18 revealed walled off necrotic collection amenable to endoscopic transluminal drainage with cystgastrostomy stenting with necrosectomy on 7/21/21.  7/28 - EGD, stent replacement (transluminal Axios with coaxial Solus), PEG-J removal (buried bumper) and replacement of new PEG-J with one new T-tag. Repeat necrosectomy and Axios removal/replacement Friday 8/6. Repeat CT on 8/9. Most recent necrosectomy 8/11 without plans for further unless required due to persistent fevers/pain.     - Continue Cefepime 1 gm Q 24 + flagyl, previously recommended to continue through final debridement, will reevaluate as pt progresses clinically  - Daily CBC, watching fever curve  - GI following, appreciate recs              -  "Pancreatic enzymes   - Flush drain with 100ml q4hr per GI   - Blood cultures and fluid cultures NGTD    # Secondary Bacterial Peritonitis 2/2 infected pseudocyst  # Peripancreatic fluid collection growing VRE   7/2 - Diagnostic paracentesis (WBC 2663 - 74% PMN, SAAG <1.1, protein 4.1)     Antibiotics:   Current: Cefepime 1 gm Q 24 + flagyl 500 q8 (8/6-present)     Past:  - Linezolid 7/21-7/30  - Vancomcyin 6/29  - Meropenem 6/29-7/18  - Micafungin 6/29-7/1  - Cipro 7/19-7/21  - Ceftriaxone 7/21-7/24; 7/26-7/27  - Zosyn 7/27-8/6    - Paracentesis PRN      # Right hepatic/pancreatic hydrothorax  Intermittently increased subjective shortness of breath. CXR on 7/19 w/ increased RLL haziness; ddx includes pleural effusion v pneumonia v atelectasis. CT on 7/28 showed large volume R hepatic hydrothorax with complete atelectasis of right lung, bedside thora completed 7/28 with labs and cultures.     CXR 7/30 showed moderate R pleural effusion, unchanged from previous and improved aeration of right lung with small remaining pleural effusion on CT. Thora cultures negative. Repeat Thora on 8/9 showing exudative fluid     - IR completed thoracentesis 7/28 - exudative  - ID following: agree to continue with empiric abx, though pleural effusion most likely 2/2 hepatic hydrothorax and pancreatitis. Defer plan for repeat thoracentesis based on symptoms and oxygen requirement. No plan for chest tube given concern for infected pleural effusion  - Supplemental oxygen as needed to keep sats above 89%  - Repeat Thoracentesis PRN for dyspnea    #Hematochezia  Patient CTA-Chest on 8/8 with \"possible\" non-obstructive PE in [facility] Pulmonary Artery Branch. Patient has largely been bed bound and not on DVT prophylaxis for several days, especially in setting of recent procedures. Troponin negative x2. BRBPR with heparin. Ultrasounds negative for DVT.  - CTA-Chest on 8/10 negative for PE  - Hematology recommends starting prophylactic dose " anticoagulation once stable x1 week, stepping up to unfractionated heparin, likely will need 3 months of anticoagulation   - Heme to be contacted prior to discharge when ready to transition to orals    # Lactic acidosis  Multifactorial d/t decreased lactic clearance in the setting of acute hepatic failure, ESRD, and acute on chronic infection.  - Continue to trend and assess vitals     # Gastric Outlet Obstruction  High G tube output due to the cystogastric connection/fluid drainage noted on 8/5. Per GI, possibly related to gastric outlet from severe abdominal inflammation and expected. KUB negative for obstructive gas pattern. Ileus from narcotics considered, but output not consistent with tube feeds  -Improved after cystogastrectomy  -Anti-emetics  -Will monitor and continue to discuss with GI  -Having fluid removal per nephrology due to HD    #Hypokalemia  High GI output state. Replete as needed with ESRD noted. Improved with decreased G-tube drainage after cystogastrectomy.    # Severe malnutrition in setting of chronic illness  PEG-J placed 7/13. Patient intermittently refusing tube feeds d/t nausea/vomiting.  - Dietitian consult for new tube feeds, appreciate recs   - Continuous TF per dietician order (advancing VERY slowly) with goal of 65 ml/hr              - Cont panc enzymes   - Will trial regular diet with level 0 liquids as below. If vomiting, will make NPO.  - Regular Diet, thin liquids per SLP  - VFSS for 8/10 to evaluate for dysphagia - cleared for level 0 liquids though vomited following  - Continue multivitamins  - Pantoprazole 40 mg IV daily  - Zofran PRN    Malnutrition:    - Level of malnutrition: Severe   - Based on: moderate/severe subcutaneous fat loss, moderate/severe muscle loss     # Alcoholic hepatitis complicated by portal hypertension, ascites, and HE - improving   # Hx of Alcohol use disorder c/b withdrawal   # Anemia  # Coagulopathy   Patient with a history of marked alcohol use and  alcoholic hepatitis in the past. Was given a course of steroids (1 week) during previous admission at OSH. Patient mental status has been stable, alert and oriented x4 after rifaxamin and aggressive lactulose. Not a liver transplant candidate.   - Lactulose 20g daily and PRN and rifaximin 550mg BID - as patient allows  - Daily folic acid and thiamine  - Delirium precautions  - Melatonin 6mg at bedtime  - Daily CBC   - Transfuse if hemoglobin < 7, platelet < 10 or < 30 with bleeding  - Continue to monitor for signs of bleeding      # Stage III RADHA on HD due to ATN  # ESRD due to ATN  # Hypervolemia causing acute respiratory failure, resolved  Nephrology note 7/21 indicates that patient has been on renal replacement therapy for two months and pt status has most likely transitioned from ARF to ESRD at this point.      > Hemodialysis as per renal reccs - TTS              - Midodrine 10 mg PRN before dialysis sessions     > Daily chemistry labs, strict I/O, and daily weights  - Aspiration precautions  - Supplemental oxygen as needed to keep sats above 89%    #Anemia  Baseline hgb 17. Running between 6-8 in setting of chronic illness, new ESRD, frequent lab draws and procedures.  - Goal hgb >7.0, trend Hgb    # Intermittent lactic acidosis  Suspect due to poor lactate clearance due to ESRD and ESLD + ongoing inflammation/infection. No fluids given pt is on dialysis; however, did receive Albumin last night.    # Apathy, concern for depression  Patient is intermittently refusing cares due to wanting a 'break,' but also reaffirms that he wants everything done to prolong his life. Mood is depressed, apathetic at times. Patient has multiple stressors including his own medical conditions and family health issues.  - Continue to offer emotional support services such as psychiatry, palliative,   - He declined psychiatry/psychology visits     # Goals of Care  Please see additional interval note for in depth details  regarding conversation on 7/9 about goals of care. Patient's long term prognosis is still quite poor given his multi organ failure. Care conference on 7/15, pt did not wish to participate. Met with sisters Noni (on phone) and Melyssa and close friend Leroy with palliative, nephrology and general medicine teams to discuss Dax's current improving status, poor prognosis and wishes to continue full cares. *  - Hepatology confirmed pt will not be a transplant candidate  - Daily phone updates to sister Noni ([phone])       Diet: Adult Formula Drip Feeding: Continuous Vital 1.5; Jejunostomy; Goal Rate: 60; mL/hr; Medication - Feeding Tube Flush Frequency: At least 15-30 mL water before and after medication administration and with tube clogging; Amount to Send (Nutrition us...  Regular Diet Adult Thin Liquids (level 0)    DVT Prophylaxis: Pneumatic Compression Devices  Rdz Catheter: Not present  Fluids: 10 mL NS TKO  Central Lines: PRESENT  PICC Triple Lumen 06/28/21 Left-Site Assessment: WDL except  CVC Double Lumen Right-Site Assessment: WDL  Code Status: Full Code      Disposition Plan   Expected discharge: >7 days, recommended to transitional care unit once  nutrition improved, infected pancreatic pseudocyst is well managed and plan for dialysis is in place.       Justin Vieyra MD - PGY1  [address]  Securely message with the Vocera Web Console (learn more here)  Text page via [facility] Paging/Directory  Please see sign in/sign out for up to date coverage information  _____________________________________________________________________    Interval History   RRT called overnight for Lactate of 2.3 in setting of tachycardia and leukocytosis. Overall no significant change from that of pt's baseline. Already on abx. Did receive Albumin.    Overall without complaints this morning, no worsening pain, no new dyspnea or chest pain. No significant nausea while NPO.  Awaiting surgery this morning.     Data reviewed today: I reviewed all medications, new labs and imaging results over the last 24 hours.    Physical Exam   Vital Signs: Temp: 97.7  F (36.5  C) Temp src: Oral BP: 94/46 Pulse: 111   Resp: 16 SpO2: 95 % O2 Device: Nasal cannula Oxygen Delivery: 1 LPM  Weight: 153 lbs 3.52 oz  Constitutional: Awake, non-diaphoretic, conversational regarding upcoming surgery plans  HEENT: Normocephalic. Atraumatic. No scleral icterus.  Cardiovascular: tachycardic, tender chest to palpation diffusely  Respiratory: no increased work of breathing, some decreased breath sounds to the right lower lung field  Musculoskeletal: Emaciated, moving all extremities on examination  Skin: resolving jaundice    Data   Recent Labs   Lab 08/11/21  0729 08/11/21  0555 08/10/21  0631 08/09/21  1312 08/09/21  0326 08/08/21  1435 08/08/21  0509 08/06/21  1255 08/06/21  1002   WBC  --  17.0* 16.8* 15.1* 17.4*  --  18.8*   < >  --    HGB  --  8.2* 7.9* 8.3* 8.0*  --  8.0*   < >  --    MCV  --  87 87 90 88  --  89   < >  --    PLT  --  274 298 290 275  --  264   < >  --    INR  --   --   --   --   --   --  1.74*  --  1.68*   NA  --  128* 123*  --  126*  --  129*   < >  --    POTASSIUM  --  3.7 3.6  --  3.6  --  3.9   < >  --    CHLORIDE  --  97 91*  --  93*  --  97   < >  --    CO2  --  22 19*  --  24  --  22   < >  --    BUN  --  12 30  --  19  --  13   < >  --    CR  --  2.14* 3.81*  --  2.81*  --  2.01*   < >  --    ANIONGAP  --  9 13  --  9  --  10   < >  --    JANENE  --  8.0* 7.6*  --  7.8*  --  8.0*   < >  --    GLC 94 85 142*  --  139*  --  128*   < >  --    ALBUMIN  --  1.6* 1.4*  --  1.6*  --  1.6*   < >  --    PROTTOTAL  --  6.9 6.7*  --  7.1  --  7.0   < >  --    BILITOTAL  --  2.2* 2.2*  --  2.5*  --  2.8*   < >  --    ALKPHOS  --  109 110  --  121  --  126   < >  --    ALT  --  16 18  --  22  --  24   < >  --    AST  --  26 31  --  41  --  51*   < >  --    TROPONIN  --   --   --   --   --  <0.015  <0.015  --   --     < > = values in this interval not displayed.     Recent Results (from the past 24 hour(s))   XR Surgery CESIA L/T 5 Min Fluoro w Stills    Narrative    This exam was marked as non-reportable because it will not be read by a   radiologist or a [facility] non-radiologist provider.

## 2021-08-11 NOTE — ANESTHESIA PROCEDURE NOTES
Airway       Patient location during procedure: OR       Procedure Start/Stop Times: 8/11/2021 9:06 AM  Staff -        CRNA: Pia Barraza APRN CRNA       Performed By: CRNA  Consent for Airway        Urgency: elective  Indications and Patient Condition       Indications for airway management: gera-procedural       Induction type:intravenous       Mask difficulty assessment: 1 - vent by mask    Final Airway Details       Final airway type: endotracheal airway       Successful airway: ETT - single  Endotracheal Airway Details        ETT size (mm): 8.0       Cuffed: yes       Successful intubation technique: direct laryngoscopy       DL Blade Type: Lake 2       Grade View of Cords: 1       Adjucts: stylet       Position: Right       Measured from: gums/teeth       Secured at (cm): 23       Bite Block used: ENDO bite block in place.    Post intubation assessment        Placement verified by: capnometry        Number of attempts at approach: 1       Secured with: other (comment) (tube tamer)       Ease of procedure: easy       Dentition: Intact and Unchanged

## 2021-08-11 NOTE — ANESTHESIA PREPROCEDURE EVALUATION
Anesthesia Pre-Procedure Evaluation    Patient: Dax Villareal   MRN: [id number] : 1990        Preoperative Diagnosis: Necrotizing pancreatitis [K85.91]   Procedure : Procedure(s):  ESOPHAGOGASTRODUODENOSCOPY (EGD) with necrosectomy     Past Medical History:   Diagnosis Date     2008      Past Surgical History:   Procedure Laterality Date     ENDOSCOPIC INSERTION TUBE GASTROSTOMY N/A 2021    Procedure: Upper endoscopy, INSERTION, PEG-J TUBE and Gastropexy;  Surgeon: Rayshawn Will MD;  Location: [facility] OR     ENDOSCOPIC ULTRASOUND UPPER GASTROINTESTINAL TRACT (GI) N/A 2021    Procedure: ENDOSCOPIC ULTRASOUND, ESOPHAGOSCOPY / UPPER GASTROINTESTINAL TRACT (GI) with cyst gastrostomy, dilation;  Surgeon: Guru Yecenia Chacko MD;  Location: [facility] OR     ESOPHAGOSCOPY, GASTROSCOPY, DUODENOSCOPY (EGD), COMBINED N/A 2021    Procedure: ESOPHAGOGASTRODUODENOSCOPY (EGD), gastrostomy apposition, Necrosectomy, stent exchange.;  Surgeon: Rayshawn Will MD;  Location: [facility] OR     ESOPHAGOSCOPY, GASTROSCOPY, DUODENOSCOPY (EGD), COMBINED N/A 2021    Procedure: ESOPHAGOGASTRODUODENOSCOPY (EGD) with necrosectomy, and stents exchanged;  Surgeon: Jose Quigley MD;  Location: [facility] OR     IR ABSCESS TUBE CHANGE  2021     ORTHOPEDIC SURGERY Right     fracture repair     REPLACE GASTROSTOMY TUBE, PERCUTANEOUS N/A 2021    Procedure: gastro-jejunostomy tube exchange;  Surgeon: Rayshawn Will MD;  Location: [facility] OR      Allergies   Allergen Reactions     Tylenol [Acetaminophen] Itching      Social History     Tobacco Use     Smoking status: Current Every Day Smoker     Smokeless tobacco: Never Used   Substance Use Topics     Alcohol use: Yes      Wt Readings from Last 1 Encounters:   21 69.5 kg (153 lb 3.5 oz)        Anesthesia Evaluation   Pt has had prior anesthetic. Type: General.        ROS/MED HX  ENT/Pulmonary:     (+) tobacco use, Current use,     Neurologic:      (+) delerium,     Cardiovascular:       METS/Exercise Tolerance:     Hematologic:       Musculoskeletal:       GI/Hepatic: Comment: Necrotizing pancreatitis 2/2 EtOH abuse    (+) liver disease (ESLD c/b hepatic encephalopathy),     Renal/Genitourinary:     (+) renal disease, type: ARF, Pt requires dialysis, type: Hemodialysis,     Endo:  - neg endo ROS     Psychiatric/Substance Use: Comment: ADHD    (+) psychiatric history alcohol abuse     Infectious Disease: Comment: Sepsis    (+) VRE,     Malignancy:       Other:            Physical Exam    Airway        Mallampati: III   TM distance: > 3 FB   Neck ROM: full   Mouth opening: < 3 cm    Respiratory Devices and Support     Nasal Canula 2  l/min.     Dental         B=Bridge, C=Chipped, L=Loose, M=Missing    Cardiovascular          Rhythm and rate: regular and tachycardia     Pulmonary           (+) rhonchi           OUTSIDE LABS:  CBC:   Lab Results   Component Value Date    WBC 17.0 (H) 08/11/2021    WBC 16.8 (H) 08/10/2021    HGB 8.2 (L) 08/11/2021    HGB 7.9 (L) 08/10/2021    HCT 23.9 (L) 08/11/2021    HCT 23.0 (L) 08/10/2021     08/11/2021     08/10/2021     BMP:   Lab Results   Component Value Date     (L) 08/11/2021     (L) 08/10/2021    POTASSIUM 3.7 08/11/2021    POTASSIUM 3.6 08/10/2021    CHLORIDE 97 08/11/2021    CHLORIDE 91 (L) 08/10/2021    CO2 22 08/11/2021    CO2 19 (L) 08/10/2021    BUN 12 08/11/2021    BUN 30 08/10/2021    CR 2.14 (H) 08/11/2021    CR 3.81 (H) 08/10/2021    GLC 94 08/11/2021    GLC 85 08/11/2021     COAGS:   Lab Results   Component Value Date    PTT 97 (H) 06/28/2021    INR 1.74 (H) 08/08/2021    FIBR 225 06/28/2021     POC:   Lab Results   Component Value Date     (H) 07/05/2021     HEPATIC:   Lab Results   Component Value Date    ALBUMIN 1.6 (L) 08/11/2021    PROTTOTAL 6.9 08/11/2021    ALT 16 08/11/2021    AST 26 08/11/2021    ALKPHOS 109 08/11/2021    BILITOTAL 2.2 (H) 08/11/2021    ADALBERTO 28  06/28/2021     OTHER:   Lab Results   Component Value Date    LACT 2.3 (H) 08/10/2021    JANENE 8.0 (L) 08/11/2021    PHOS 3.2 08/11/2021    MAG 1.7 08/11/2021    LIPASE 199 06/28/2021    CRP 81.0 (H) 08/07/2021       Anesthesia Plan    ASA Status:  4      Anesthesia Type: General.     - Airway: ETT   Induction: Intravenous.   Maintenance: Balanced.        Consents    Anesthesia Plan(s) and associated risks, benefits, and realistic alternatives discussed. Questions answered and patient/representative(s) expressed understanding.     - Discussed with:  Patient         Postoperative Care    Pain management: Multi-modal analgesia.        Comments:                Harlan Bauer MD

## 2021-08-11 NOTE — ANESTHESIA POSTPROCEDURE EVALUATION
Patient: Dax Villareal    Procedure(s):  ESOPHAGOGASTRODUODENOSCOPY (EGD) with necrosectomy, stent exchange.    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    Anesthesia Type:  General    Note:  Disposition: Inpatient   Postop Pain Control: Uneventful            Sign Out: Well controlled pain   PONV: No   Neuro/Psych: Uneventful            Sign Out: Acceptable/Baseline neuro status   Airway/Respiratory: Uneventful            Sign Out: Acceptable/Baseline resp. status   CV/Hemodynamics: Uneventful            Sign Out: Acceptable CV status; No obvious hypovolemia; No obvious fluid overload   Other NRE:    DID A NON-ROUTINE EVENT OCCUR?            Last vitals:  Vitals Value Taken Time   /53 08/11/21 1220   Temp 37.3  C (99.1  F) 08/11/21 1153   Pulse 121 08/11/21 1222   Resp 19 08/11/21 1222   SpO2 97 % 08/11/21 1222   Vitals shown include unvalidated device data.    Electronically Signed By: Harlan Bauer MD  August 11, 2021  12:24 PM

## 2021-08-11 NOTE — ANESTHESIA CARE TRANSFER NOTE
Patient: Dax Villareal    Procedure(s):  ESOPHAGOGASTRODUODENOSCOPY (EGD) with necrosectomy, stent exchange.    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    Anesthesia Type:   General     Note:    Oropharynx: oropharynx clear of all foreign objects  Level of Consciousness: awake  Oxygen Supplementation: face mask  Level of Supplemental Oxygen (L/min / FiO2): 6  Independent Airway: airway patency satisfactory and stable  Dentition: dentition unchanged  Vital Signs Stable: post-procedure vital signs reviewed and stable  Report to RN Given: handoff report given  Patient transferred to: PACU    Handoff Report: Identifed the Patient, Identified the Reponsible Provider, Reviewed the pertinent medical history, Discussed the surgical course, Reviewed Intra-OP anesthesia mangement and issues during anesthesia, Set expectations for post-procedure period and Allowed opportunity for questions and acknowledgement of understanding      Vitals:  Vitals Value Taken Time   BP     Temp     Pulse     Resp     SpO2         Electronically Signed By: SOFIA Au CRNA  August 11, 2021  11:52 AM

## 2021-08-11 NOTE — PLAN OF CARE
"/44 (BP Location: Right arm)   Pulse 118   Temp 98.5  F (36.9  C) (Oral)   Resp 16   Ht 1.676 m (5' 5.98\")   Wt 69.5 kg (153 lb 3.5 oz)   SpO2 95%   BMI 24.74 kg/m      Neuro: A&Ox4. Able to make needs know, flat affect and refusing cares  Cardiac: ST. 110's-130's, BP's soft 80's-100's/30's-50's, team aware, VSS. T- Max 99.4   Respiratory: Sating >95% on 2L NC, pt prefers to wear oxygen for comfort and unable to completely wean O2 d/t pt preference  GI/: aneuric and on hemodyalisys. 1.6L removed in dialysis today. BM X1- loose and watery, yellow, denies nausea overnight- Zofran available q6 if needed  Diet/appetite: NPO @ 0000 8/11 ( otherwise, Regular diet with continuous TF @60ml/hr (at goal) creon and bicarb added, 60ml q4 FWF), poor oral intake   Activity:  Assist of 1-2 for turns and repositions  Pain: At acceptable level on current regimen. 2mg oral Dilaudid scheduled q8, PRN 1mg oral dilaudid q4 available (given x1 this shift)  Skin: No new deficits noted. Refused assessment of skin under G-tube and L Drain site, blanchable redness on buttock (refused repositioning), refused dressing changes  LDA's:  G-Gravity- unclamped currently  J-TF/Meds- currently clamped d/t NPO orders  Triple Lumen PICC: SLx2, TKO&ABX  HD Line: SLx2  L Abd. Drain to drain pseudocyst: Flushed w/100ml NS q4, open to gravity    Albumin given overnight to treat sepsis.     Plan: Esophagogatsroduduenoscopy (EGD) with necronectomy, scheduled for this AM, Transfer Orders to Med Surg unit. Continue with POC. Notify primary team with changes.     Yessenia Yancey RN  6B Intermediate Care Unit    "

## 2021-08-11 NOTE — PROGRESS NOTES
08/10/21 2000   Call Information   Date of Call 08/10/21   Time of Call 2020   Name of person requesting the team Yessenia Yancey   Title of person requesting team RN   RRT Arrival time 2021   Time RRT ended 2044   Reason for call   Type of RRT Adult   Primary reason for call Sepsis suspected   Sepsis Suspected Elevated Lactate level;Heart Rate > 100;WBC <4 or >12   Was patient transferred from the ED, ICU, or PACU within last 24 hours prior to RRT call? No   SBAR   Situation Lactic Acie 2.3, WBC 16.8, .   Background 30 year old male with PMHx significant for alcohol use disorder who has had a prolonged hospitalization at [facility] for acute alcohol hepatitis/end stage liver disease complicated by hepatic encephalopathy, acute renal failure requiring hemodialysis, acute hypoxemic respiratory failure and septic shock secondary to acute necrotizing pancreatitis. Transferred to [facility] ICU on 6/28/2021 for further management of necrotizing pancreatitis 30 year old male with PMHx significant for alcohol use disorder who has had a prolonged hospitalization at [facility] for acute alcohol hepatitis/end stage liver disease complicated by hepatic encephalopathy, acute renal failure requiring hemodialysis, acute hypoxemic respiratory failure and septic shock secondary to acute necrotizing pancreatitis. Transferred to [facility] ICU on 6/28/2021 for further management of necrotizing pancreatitis 30 year old male with PMHx significant for alcohol use disorder who has had a prolonged hospitalization at [facility] in [place] for acute alcohol hepatitis/end stage liver disease complicated by hepatic encephalopathy, acute renal failure requiring hemodialysis, acute hypoxemic respiratory failure and septic shock secondary to acute necrotizing pancreatitis. Transferred to [facility] ICU on 6/28/2021 for further management of necrotizing pancreatitis 30 year old male with PMHx significant for  alcohol use disorder who has had a prolonged hospitalization at [facility] in [place] for acute alcohol hepatitis/end stage liver disease complicated by hepatic encephalopathy, acute renal failure requiring hemodialysis, acute hypoxemic respiratory failure and septic shock secondary to acute necrotizing pancreatitis. Transferred to [facility] ICU on 6/28/2021 for further management of necrotizing pancreatitis 30 year old male with PMHx significant for alcohol use disorder who has had a prolonged hospitalization at [facility] in [place] for acute alcohol hepatitis/end stage liver disease complicated by hepatic encephalopathy, acute renal failure requiring hemodialysis, acute hypoxemic respiratory failure and septic shock secondary to acute necrotizing pancreatitis. Transferred to [facility] ICU on 6/28/2021 for further management of necrotizing pancreatitis 30 year old male with PMHx significant for alcohol use disorder who has had a prolonged hospitalization at [facility] in [place] for acute alcohol hepatitis/end stage liver disease complicated by hepati   Notable History/Conditions End-Stage disease;Organ failure;Recent surgery   Assessment Pt. A+O x 4.  Denies C.P., SOB, fever, chills, n/v.  Afebrile with vs at baseline.  HRR/120's.  Easy respirations on 4L with 02 sats upper 90's.     Interventions Labs;Meds;O2 per N/C or mask;Portable monitor   Adjustments to Recommend Albumin   Patient Outcome   Patient Outcome Stabilized on unit   RRT Team   Attending/Primary/Covering Physician Daniela 3   Date Attending Physician notified 08/10/21   Time Attending Physician notified 2020   Physician(s) Millie Akers NP   Lead RN Anali Yancey   RT none   Other staff Ruperto Lawson   Post RRT Intervention Assessment   Post RRT Assessment Stable/Improved   Date Follow Up Done 08/10/21   Time Follow Up Done 2250

## 2021-08-11 NOTE — PROVIDER NOTIFICATION
Ilene Suarez 3- ChesterterryCathryn    1946:  6B: 6220-1: Dax Dolan  FYI: pt -130, BP- 's/40-50's, current orders to notify if pt HR greater than 120 or DBP less than 60.   Yessenia RIVAS [id number]    2015:  Pt trigger sepsis protocol and came back with a lactic of 2.3- Rapid Response Called      Rapid Response Team Responded- assessed pt, albumin ordered

## 2021-08-11 NOTE — PLAN OF CARE
Temp: 97.7  F (36.5  C) Temp src: Oral BP: 124/72 Pulse: 115   Resp: 16 SpO2: 99 % O2 Device: Nasal cannula Oxygen Delivery: 2 LPM     Neuro: A&Ox4. Can make needs known. Labile mood, demanding, uncooperative  Cardiac: ST, HR 115s. VSS. Afebrile   Respiratory: Sating >95% on 2L NC   GI/: Anuric, on HD. BM X1-loose stool  Diet/appetite: Tolerating renal diet. Poor appetite. BS q4h. TF at 60mL/hr, FWF 60mL q4h.   Activity:  Assist of 2 and lift, up to chair. Turn and repo in bed. PT/OT ordered  Pain: At acceptable level on current regimen. Generalized pain-PRN dilaudid given x1  Skin: No new deficits noted. Jaundiced, pt refused skin assessment  LDA's: GJ tube- G to gravity, J for meds/TF  L perc drain  L TL PICC    Plan: Continue with POC. Notify primary team with changes. EGD, necrosectomy and stent replacement done this am. Pt back to floor at 1330

## 2021-08-12 LAB
ALBUMIN SERPL-MCNC: 1.6 G/DL (ref 3.4–5)
ALP SERPL-CCNC: 109 U/L (ref 40–150)
ALT SERPL W P-5'-P-CCNC: 27 U/L (ref 0–70)
ANION GAP SERPL CALCULATED.3IONS-SCNC: 12 MMOL/L (ref 3–14)
AST SERPL W P-5'-P-CCNC: 68 U/L (ref 0–45)
BILIRUB SERPL-MCNC: 2 MG/DL (ref 0.2–1.3)
BUN SERPL-MCNC: 28 MG/DL (ref 7–30)
CALCIUM SERPL-MCNC: 7.6 MG/DL (ref 8.5–10.1)
CHLORIDE BLD-SCNC: 96 MMOL/L (ref 94–109)
CO2 SERPL-SCNC: 21 MMOL/L (ref 20–32)
CREAT SERPL-MCNC: 2.78 MG/DL (ref 0.66–1.25)
ERYTHROCYTE [DISTWIDTH] IN BLOOD BY AUTOMATED COUNT: 16.4 % (ref 10–15)
GFR SERPL CREATININE-BSD FRML MDRD: 29 ML/MIN/1.73M2
GLUCOSE BLD-MCNC: 165 MG/DL (ref 70–99)
HCT VFR BLD AUTO: 22.3 % (ref 40–53)
HGB BLD-MCNC: 7.5 G/DL (ref 13.3–17.7)
LACTATE SERPL-SCNC: 1.5 MMOL/L (ref 0.7–2)
MAGNESIUM SERPL-MCNC: 2 MG/DL (ref 1.6–2.3)
MCH RBC QN AUTO: 29.5 PG (ref 26.5–33)
MCHC RBC AUTO-ENTMCNC: 33.6 G/DL (ref 31.5–36.5)
MCV RBC AUTO: 88 FL (ref 78–100)
PHOSPHATE SERPL-MCNC: 4.1 MG/DL (ref 2.5–4.5)
PLATELET # BLD AUTO: 278 10E3/UL (ref 150–450)
POTASSIUM BLD-SCNC: 4 MMOL/L (ref 3.4–5.3)
PROT SERPL-MCNC: 6.7 G/DL (ref 6.8–8.8)
RBC # BLD AUTO: 2.54 10E6/UL (ref 4.4–5.9)
SODIUM SERPL-SCNC: 129 MMOL/L (ref 133–144)
WBC # BLD AUTO: 16.5 10E3/UL (ref 4–11)

## 2021-08-12 PROCEDURE — 250N000013 HC RX MED GY IP 250 OP 250 PS 637: Performed by: INTERNAL MEDICINE

## 2021-08-12 PROCEDURE — 250N000011 HC RX IP 250 OP 636: Performed by: STUDENT IN AN ORGANIZED HEALTH CARE EDUCATION/TRAINING PROGRAM

## 2021-08-12 PROCEDURE — 80053 COMPREHEN METABOLIC PANEL: CPT | Performed by: INTERNAL MEDICINE

## 2021-08-12 PROCEDURE — 84100 ASSAY OF PHOSPHORUS: CPT | Performed by: INTERNAL MEDICINE

## 2021-08-12 PROCEDURE — 83605 ASSAY OF LACTIC ACID: CPT | Performed by: CLINICAL NURSE SPECIALIST

## 2021-08-12 PROCEDURE — 85027 COMPLETE CBC AUTOMATED: CPT | Performed by: INTERNAL MEDICINE

## 2021-08-12 PROCEDURE — 83735 ASSAY OF MAGNESIUM: CPT | Performed by: INTERNAL MEDICINE

## 2021-08-12 PROCEDURE — 99233 SBSQ HOSP IP/OBS HIGH 50: CPT | Performed by: PHYSICIAN ASSISTANT

## 2021-08-12 PROCEDURE — 999N000044 HC STATISTIC CVC DRESSING CHANGE

## 2021-08-12 PROCEDURE — 36592 COLLECT BLOOD FROM PICC: CPT | Performed by: INTERNAL MEDICINE

## 2021-08-12 PROCEDURE — 634N000001 HC RX 634: Performed by: CLINICAL NURSE SPECIALIST

## 2021-08-12 PROCEDURE — 99232 SBSQ HOSP IP/OBS MODERATE 35: CPT | Performed by: CLINICAL NURSE SPECIALIST

## 2021-08-12 PROCEDURE — 120N000005 HC R&B MS OVERFLOW UMMC

## 2021-08-12 PROCEDURE — 258N000003 HC RX IP 258 OP 636: Performed by: CLINICAL NURSE SPECIALIST

## 2021-08-12 PROCEDURE — 250N000011 HC RX IP 250 OP 636: Performed by: INTERNAL MEDICINE

## 2021-08-12 PROCEDURE — 90937 HEMODIALYSIS REPEATED EVAL: CPT

## 2021-08-12 RX ORDER — HEPARIN SODIUM 1000 [USP'U]/ML
500 INJECTION, SOLUTION INTRAVENOUS; SUBCUTANEOUS
Status: DISCONTINUED | OUTPATIENT
Start: 2021-08-12 | End: 2021-08-12

## 2021-08-12 RX ORDER — MAGNESIUM HYDROXIDE 1200 MG/15ML
LIQUID ORAL
Status: DISCONTINUED
Start: 2021-08-12 | End: 2021-08-13 | Stop reason: HOSPADM

## 2021-08-12 RX ORDER — ZINC OXIDE
OINTMENT (GRAM) TOPICAL
Status: DISCONTINUED | OUTPATIENT
Start: 2021-08-12 | End: 2021-10-12 | Stop reason: HOSPADM

## 2021-08-12 RX ORDER — HEPARIN SODIUM 1000 [USP'U]/ML
500 INJECTION, SOLUTION INTRAVENOUS; SUBCUTANEOUS CONTINUOUS
Status: DISCONTINUED | OUTPATIENT
Start: 2021-08-12 | End: 2021-08-12

## 2021-08-12 RX ORDER — CEFEPIME HYDROCHLORIDE 1 G/1
1 INJECTION, POWDER, FOR SOLUTION INTRAMUSCULAR; INTRAVENOUS EVERY 24 HOURS
Status: DISCONTINUED | OUTPATIENT
Start: 2021-08-12 | End: 2021-08-25

## 2021-08-12 RX ORDER — CEFEPIME HYDROCHLORIDE 1 G/1
1 INJECTION, POWDER, FOR SOLUTION INTRAMUSCULAR; INTRAVENOUS EVERY 24 HOURS
Status: DISCONTINUED | OUTPATIENT
Start: 2021-08-12 | End: 2021-08-12

## 2021-08-12 RX ADMIN — METRONIDAZOLE 500 MG: 500 INJECTION, SOLUTION INTRAVENOUS at 17:47

## 2021-08-12 RX ADMIN — SODIUM CHLORIDE 250 ML: 9 INJECTION, SOLUTION INTRAVENOUS at 13:58

## 2021-08-12 RX ADMIN — HYDROXYZINE HYDROCHLORIDE 50 MG: 25 TABLET, FILM COATED ORAL at 05:35

## 2021-08-12 RX ADMIN — PANCRELIPASE 2 CAPSULE: 24000; 76000; 120000 CAPSULE, DELAYED RELEASE PELLETS ORAL at 21:09

## 2021-08-12 RX ADMIN — SODIUM BICARBONATE 325 MG: 325 TABLET ORAL at 20:06

## 2021-08-12 RX ADMIN — SODIUM BICARBONATE 325 MG: 325 TABLET ORAL at 04:14

## 2021-08-12 RX ADMIN — PANCRELIPASE 2 CAPSULE: 24000; 76000; 120000 CAPSULE, DELAYED RELEASE PELLETS ORAL at 20:07

## 2021-08-12 RX ADMIN — SODIUM BICARBONATE 325 MG: 325 TABLET ORAL at 08:58

## 2021-08-12 RX ADMIN — HYDROMORPHONE HYDROCHLORIDE 1 MG: 1 SOLUTION ORAL at 05:35

## 2021-08-12 RX ADMIN — Medication 5 ML: at 08:58

## 2021-08-12 RX ADMIN — HYDROXYZINE HYDROCHLORIDE 50 MG: 25 TABLET, FILM COATED ORAL at 22:29

## 2021-08-12 RX ADMIN — PANCRELIPASE 2 CAPSULE: 24000; 76000; 120000 CAPSULE, DELAYED RELEASE PELLETS ORAL at 17:52

## 2021-08-12 RX ADMIN — ONDANSETRON 4 MG: 2 INJECTION INTRAMUSCULAR; INTRAVENOUS at 22:29

## 2021-08-12 RX ADMIN — FOLIC ACID 1 MG: 1 TABLET ORAL at 08:58

## 2021-08-12 RX ADMIN — EPOETIN ALFA-EPBX 5000 UNITS: 10000 INJECTION, SOLUTION INTRAVENOUS; SUBCUTANEOUS at 14:00

## 2021-08-12 RX ADMIN — SODIUM CHLORIDE 300 ML: 9 INJECTION, SOLUTION INTRAVENOUS at 13:58

## 2021-08-12 RX ADMIN — METRONIDAZOLE 500 MG: 500 INJECTION, SOLUTION INTRAVENOUS at 05:39

## 2021-08-12 RX ADMIN — SODIUM BICARBONATE 325 MG: 325 TABLET ORAL at 00:20

## 2021-08-12 RX ADMIN — PANCRELIPASE 2 CAPSULE: 24000; 76000; 120000 CAPSULE, DELAYED RELEASE PELLETS ORAL at 00:19

## 2021-08-12 RX ADMIN — SODIUM BICARBONATE 325 MG: 325 TABLET ORAL at 17:53

## 2021-08-12 RX ADMIN — CEFEPIME HYDROCHLORIDE 1 G: 1 INJECTION, POWDER, FOR SOLUTION INTRAMUSCULAR; INTRAVENOUS at 20:06

## 2021-08-12 RX ADMIN — THIAMINE HCL TAB 100 MG 100 MG: 100 TAB at 08:58

## 2021-08-12 RX ADMIN — PANCRELIPASE 2 CAPSULE: 24000; 76000; 120000 CAPSULE, DELAYED RELEASE PELLETS ORAL at 04:14

## 2021-08-12 RX ADMIN — HYDROMORPHONE HYDROCHLORIDE 2 MG: 1 SOLUTION ORAL at 09:12

## 2021-08-12 RX ADMIN — PANCRELIPASE 2 CAPSULE: 24000; 76000; 120000 CAPSULE, DELAYED RELEASE PELLETS ORAL at 08:57

## 2021-08-12 RX ADMIN — HYDROMORPHONE HYDROCHLORIDE 2 MG: 1 SOLUTION ORAL at 16:18

## 2021-08-12 RX ADMIN — HYDROMORPHONE HYDROCHLORIDE 1 MG: 1 SOLUTION ORAL at 13:03

## 2021-08-12 RX ADMIN — HYDROMORPHONE HYDROCHLORIDE 1 MG: 1 SOLUTION ORAL at 20:07

## 2021-08-12 ASSESSMENT — ACTIVITIES OF DAILY LIVING (ADL)
ADLS_ACUITY_SCORE: 17

## 2021-08-12 ASSESSMENT — MIFFLIN-ST. JEOR: SCORE: 1572.5

## 2021-08-12 NOTE — PLAN OF CARE
Plan of Care [id number]  Neuro:  - Aox4, emotionally labile   - Refused to answer some questions   CVC:  - SBPs 90s-1100  - HR 100s-110  Resp:  - Satting well on 1 L NC, pt refused to take it off   GI:  - 1 BM earlier today in OR   :  - Anuric, on dialysis   Diet/Appetite:  - Poor appetite, tolerating dialysis diet   Activity:  - Ax1-2, lift to get up   - Refused repositioning  Pain:  - Pt reported generalized pain   Skin:  - Pt refused turning to look at back side   - No further sin breakdown noted this shift   LDA:  - G tube open to gravity drainage   - J tube has TF running @ 60 mL (goal)   - No output from L percutaneous drain  Plan:   - Continue with POC   - Notif primary team with any changes in pt condition

## 2021-08-12 NOTE — PROGRESS NOTES
HEMODIALYSIS TREATMENT NOTE    Date: 8/12/2021  Time: 6:00 PM    Data:  Pre Wt: 67.5 kg (155 lb 6.8 oz)   Desired Wt: 65.5 kg   Post Wt: 65.5 kg (153 lb 3.5 oz) (estimate)  Weight change: 2.0 kg  Ultrafiltration - Post Run Net Total Removed : 2000 mL  Vascular Access Status: CVC  patent  Dialyzer Rinse: Streaked, Light  Total Blood Volume Processed: 69.31 L   Total Dialysis (Treatment) Time: 3.0   Dialysate Bath: K 3, Ca 3  Heparin: None    Lab:   Yes : Lactic Acid riana & sent to lab.    Assessment / Interventions: 3.0 hours HD via RCVC.  with good flow.Epogen given during treatment & due Dilaudid was given by primary team . Hemodynamic stable throughout the treatment. 2.0 kg UF removed without complication. Pt completed his treatment time, no issues, blood rinsed back, CVC saline locked, hand off report given & pt transfer back to the floor in a stable condition.       Plan:    Per Renal Team

## 2021-08-12 NOTE — PROGRESS NOTES
[facility]  Progress Note - [facility] 3 Service        Date of Admission:  6/28/2021    Assessment & Plan   Dax Villareal is a 30 year old male with hx of alcohol use disorder admitted on 6/28/2021 after recent prolonged admission at [facility] in [place] who has severe acute alcoholic hepatitis c/b HE, ESRD requiring HD, and malnutrition in the setting of acute necrotizing pancreatitis, s/p PEG-J placement on 7/13. He arrived with acute respiratory failure and septic shock that have resolved, and his secondary bacterial peritonitis has been appropriately treated. He requires continued management for ESRD, hepatic encephalopathy, infected pancreatic pseudocyst, nutritional support via PEG-J, and persistent right pleural effusion. Clinically improving after multiple necrosectomies.     Today:  - RP drain capped by IR, will monitor for symptoms and follow IR recs for pulling. Will keep ABx until RP drain comes out.  - Rifaxamin discontinued.  - Pantoprazole IV (for BRBPR on 8/10) discontinued (thought due to heparin in setting of recent GI procedure)  - No anticoagulation for 1 week per Heme recs. Held dialysis heparin  - Pain improving     # Necrotizing alcoholic pancreatitis w/ infected pseudocyst s/p RP Drain and Cystogastrostomy  # Septic Shock, resolved   # Leukocytosis    Admitted to the [facility] ICU on 6/28/2021 from [facility] in [place] for septic shock due to necrotizing pancreatitis. CT on admit showing mature collection with enhancing wall measuring ~[id number]. IR placed perc drain 6/29. Repeat imaging 7/18 revealed walled off necrotic collection amenable to endoscopic transluminal drainage with cystgastrostomy stenting with necrosectomy on 7/21/21.  7/28 - EGD, stent replacement (transluminal Axios with coaxial Solus), PEG-J removal (buried bumper) and replacement of new PEG-J with one new T-tag. Repeat necrosectomy and Axios removal/replacement Friday 8/6.  Repeat CT on 8/9. 8/11 - EGD with completion necrosectomy, no e/o bowel fistula, no plans for further necrosectomy unless further pain/fevers warrants further evaluation     - Continue Cefepime 1 gm Q 24 + flagyl, previously recommended to continue through final debridement, will reevaluate as pt progresses clinically  - Daily CBC, watching fever curve  - GI following, appreciate recs              - Pancreatic enzymes   - Flush drain with 100ml q4hr per GI   - Blood cultures and fluid cultures NGTD    # Secondary Bacterial Peritonitis 2/2 infected pseudocyst  # Peripancreatic fluid collection growing VRE   7/2 - Diagnostic paracentesis (WBC 2663 - 74% PMN, SAAG <1.1, protein 4.1)     Antibiotics:   Current: Cefepime 1 gm Q 24 + flagyl 500 q8 (8/6-present)     Past:  - Linezolid 7/21-7/30  - Vancomcyin 6/29  - Meropenem 6/29-7/18  - Micafungin 6/29-7/1  - Cipro 7/19-7/21  - Ceftriaxone 7/21-7/24; 7/26-7/27  - Zosyn 7/27-8/6    - Paracentesis PRN      # Right hepatic/pancreatic hydrothorax  Intermittently increased subjective shortness of breath. CXR on 7/19 w/ increased RLL haziness; ddx includes pleural effusion v pneumonia v atelectasis. CT on 7/28 showed large volume R hepatic hydrothorax with complete atelectasis of right lung, bedside thora completed 7/28 with labs and cultures.     CXR 7/30 showed moderate R pleural effusion, unchanged from previous and improved aeration of right lung with small remaining pleural effusion on CT. Thora cultures negative. Repeat Thora on 8/9 showing exudative fluid     - IR completed thoracentesis 7/28 - exudative  - ID following: agree to continue with empiric abx, though pleural effusion most likely 2/2 hepatic hydrothorax and pancreatitis. Defer plan for repeat thoracentesis based on symptoms and oxygen requirement. No plan for chest tube given concern for infected pleural effusion  - Supplemental oxygen as needed to keep sats above 89%  - Repeat Thoracentesis PRN for  "dyspnea    #Hematochezia  Patient CTA-Chest on 8/8 with \"possible\" non-obstructive PE in [facility] Pulmonary Artery Branch. Patient has largely been bed bound and not on DVT prophylaxis for several days, especially in setting of recent procedures. Troponin negative x2. BRBPR with heparin. Ultrasounds negative for DVT.  - CTA-Chest on 8/10 negative for PE  - Hematology recommends starting prophylactic dose anticoagulation once stable x1 week, stepping up to unfractionated heparin, likely will need 3 months of anticoagulation   - Heme to be contacted prior to discharge when ready to transition to orals    # Lactic acidosis  Multifactorial d/t decreased lactic clearance in the setting of acute hepatic failure, ESRD, and acute on chronic infection.  - Continue to trend and assess vitals     # Gastric Outlet Obstruction  High G tube output due to the cystogastric connection/fluid drainage noted on 8/5. Per GI, possibly related to gastric outlet from severe abdominal inflammation and expected. KUB negative for obstructive gas pattern. Ileus from narcotics considered, but output not consistent with tube feeds  -Improved after cystogastrectomy  -Anti-emetics  -Will monitor and continue to discuss with GI  -Having fluid removal per nephrology due to HD    #Hypokalemia  High GI output state. Replete as needed with ESRD noted. Improved with decreased G-tube drainage after cystogastrectomy.    # Severe malnutrition in setting of chronic illness  PEG-J placed 7/13. Patient intermittently refusing tube feeds d/t nausea/vomiting.  - Dietitian consult for new tube feeds, appreciate recs   - Continuous TF per dietician order (advancing VERY slowly) with goal of 65 ml/hr              - Cont panc enzymes   - Will trial regular diet with level 0 liquids as below  - Regular Diet, thin liquids per SLP  - VFSS for 8/10 to evaluate for dysphagia - cleared for level 0 liquids though vomited following  - Continue multivitamins  - Pantoprazole 40 " mg IV daily  - Zofran PRN    Malnutrition:    - Level of malnutrition: Severe   - Based on: moderate/severe subcutaneous fat loss, moderate/severe muscle loss     # Alcoholic hepatitis complicated by portal hypertension, ascites, and HE - improving   # Hx of Alcohol use disorder c/b withdrawal   # Anemia  # Coagulopathy   Patient with a history of marked alcohol use and alcoholic hepatitis in the past. Was given a course of steroids (1 week) during previous admission at OSH. Patient mental status has been stable, alert and oriented x4 after rifaxamin and aggressive lactulose. Not a liver transplant candidate.   - Lactulose 20g daily and PRN and rifaximin 550mg BID - as patient allows  - Daily folic acid and thiamine  - Delirium precautions  - Melatonin 6mg at bedtime  - Daily CBC   - Transfuse if hemoglobin < 7, platelet < 10 or < 30 with bleeding  - Continue to monitor for signs of bleeding      # Stage III RADHA on HD due to ATN  # ESRD due to ATN  # Hypervolemia causing acute respiratory failure, resolved  Nephrology note 7/21 indicates that patient has been on renal replacement therapy for two months and pt status has most likely transitioned from ARF to ESRD at this point.      > Hemodialysis as per renal reccs - TTS              - Midodrine 10 mg PRN before dialysis sessions     > Daily chemistry labs, strict I/O, and daily weights  - Aspiration precautions  - Supplemental oxygen as needed to keep sats above 89%    #Anemia  Baseline hgb 17. Running between 6-8 in setting of chronic illness, new ESRD, frequent lab draws and procedures.  - Goal hgb >7.0, trend Hgb    # Intermittent lactic acidosis  Suspect due to poor lactate clearance due to ESRD and ESLD + ongoing inflammation/infection. No fluids given pt is on dialysis; however, did receive Albumin last night.    # Apathy, concern for depression  Patient is intermittently refusing cares due to wanting a 'break,' but also reaffirms that he wants everything done  to prolong his life. Mood is depressed, apathetic at times. Patient has multiple stressors including his own medical conditions and family health issues.  - Continue to offer emotional support services such as psychiatry, palliative,   - He declined psychiatry/psychology visits     # Goals of Care  Please see additional interval note for in depth details regarding conversation on 7/9 about goals of care. Patient's long term prognosis is still quite poor given his multi organ failure. Care conference on 7/15, pt did not wish to participate. Met with sisters Noni (on phone) and Melyssa and close friend Leroy with palliative, nephrology and general medicine teams to discuss Dax's current improving status, poor prognosis and wishes to continue full cares.  - Hepatology confirmed pt will not be a transplant candidate  - Daily phone updates to sister Noni ([phone])       Diet: Adult Formula Drip Feeding: Continuous Vital 1.5; Jejunostomy; Goal Rate: 60; mL/hr; Medication - Feeding Tube Flush Frequency: At least 15-30 mL water before and after medication administration and with tube clogging; Amount to Send (Nutrition us...  Regular Diet Adult Thin Liquids (level 0)    DVT Prophylaxis: Pneumatic Compression Devices  Rdz Catheter: Not present  Fluids: 10 mL NS TKO  Central Lines: PRESENT  PICC Triple Lumen 06/28/21 Left-Site Assessment: WDL  CVC Double Lumen Right-Site Assessment: WDL  Code Status: Full Code      Disposition Plan   Expected discharge: >7 days, recommended to transitional care unit once  nutrition improved, infected pancreatic pseudocyst is well managed and plan for dialysis is in place.       Johny Ortiz MD  [phone]  [address]  Securely message with the Vocera Web Console (learn more here)  Text page via [employer] Paging/Directory  Please see sign in/sign out for up to date coverage  information  _____________________________________________________________________    Interval History   NAEON. Episode of hypotension and hypoglycemia charted overnight. 2x loose BM.    Physical Exam   Vital Signs: Temp: 98.4  F (36.9  C) Temp src: Oral BP: 111/66 Pulse: 102   Resp: 18 SpO2: 96 % O2 Device: Nasal cannula Oxygen Delivery: 1 LPM  Weight: 148 lbs 12.97 oz  Constitutional: Awake, non-diaphoretic, conversational regarding upcoming surgery plans  HEENT: Normocephalic. Atraumatic. No scleral icterus.  Cardiovascular: tachycardic, tender chest to palpation diffusely  Respiratory: tachypnic on 1LPM of O2, improving breath sounds  Abdomen: Tolerates palpation with minimal pain, PEG-tube site clean  Musculoskeletal: Emaciated, moving all extremities on examination  Skin: resolving jaundice    Data   Recent Labs   Lab 08/12/21  0528 08/11/21  0729 08/11/21  0555 08/10/21  0631 08/08/21  2241 08/08/21  1435 08/08/21  0509 08/06/21  1255 08/06/21  1002   WBC 16.5*  --  17.0* 16.8*  --   --  18.8*   < >  --    HGB 7.5*  --  8.2* 7.9*  --   --  8.0*   < >  --    MCV 88  --  87 87  --   --  89   < >  --      --  274 298  --   --  264   < >  --    INR  --   --   --   --   --   --  1.74*  --  1.68*   *  --  128* 123*   < >  --  129*   < >  --    POTASSIUM 4.0  --  3.7 3.6   < >  --  3.9   < >  --    CHLORIDE 96  --  97 91*   < >  --  97   < >  --    CO2 21  --  22 19*   < >  --  22   < >  --    BUN 28  --  12 30   < >  --  13   < >  --    CR 2.78*  --  2.14* 3.81*   < >  --  2.01*   < >  --    ANIONGAP 12  --  9 13   < >  --  10   < >  --    AJNENE 7.6*  --  8.0* 7.6*   < >  --  8.0*   < >  --    * 94 85 142*   < >  --  128*   < >  --    ALBUMIN 1.6*  --  1.6* 1.4*   < >  --  1.6*   < >  --    PROTTOTAL 6.7*  --  6.9 6.7*   < >  --  7.0   < >  --    BILITOTAL 2.0*  --  2.2* 2.2*   < >  --  2.8*   < >  --    ALKPHOS 109  --  109 110   < >  --  126   < >  --    ALT 27  --  16 18   < >  --  24   < >  --     AST 68*  --  26 31   < >  --  51*   < >  --    TROPONIN  --   --   --   --   --  <0.015 <0.015  --   --     < > = values in this interval not displayed.     Recent Results (from the past 24 hour(s))   XR Surgery CESIA L/T 5 Min Fluoro w Stills    Narrative    This exam was marked as non-reportable because it will not be read by a   radiologist or a [facility] non-radiologist provider.

## 2021-08-12 NOTE — PROGRESS NOTES
Brief hematology note    Patient not seen today but chart and labs reviewed.   No further bleeding noted on chart.     Please see our recs and notes from 8/9 and 8/11 for further details and recommendations.     Hematology will sign off at this time.    Please let us know if any further questions or whenever he is ready to transition to an outpatient anticoagulation regimen.    Anuel Bridges MD  Hematology/Oncology Fellow

## 2021-08-12 NOTE — PROGRESS NOTES
"/85   Pulse 120   Temp 98.1  F (36.7  C)   Resp 17   Ht 1.676 m (5' 5.98\")   Wt 67.5 kg (148 lb 13 oz)   SpO2 96%   BMI 24.03 kg/m       Neuro: A&Ox4. Flat affect.  Cardiac: Afebrile, VSS.   Respiratory: 1-2L NC per pt request.  GI/:on HD .No BM this shift.   Diet/appetite: Tolerating diet. Poor appetite. TFs 60, Denies nausea   Activity: Up Turn and repo.    Pain: Agreeable to current regimen.   Skin: No new deficits noted.  LDA: L drain (irrigated q4h), G/J tube - G to gravity drainage and J w/continuous tube feeds, LUE PICC, R chest HD access.  No new complaints.Pt Will continue to monitor and follow plan of care.       "

## 2021-08-12 NOTE — PLAN OF CARE
"/66 (BP Location: Right arm)   Pulse 102   Temp 98.4  F (36.9  C) (Oral)   Resp 18   Ht 1.676 m (5' 5.98\")   Wt 67.5 kg (148 lb 13 oz)   SpO2 96%   BMI 24.03 kg/m    Neuro: A&Ox3-4. Answers questions correctly, however occasionally has illogical statements. Labile mood.   Cardiac: ST. Other VSS.   Respiratory: Sating 90s on 1L (per pt, wears for comfort).  GI/: Anuric on HD. X2 loose Bms on bedpan.  Diet/appetite: Tolerating reg diet + cont tube feeds. TF running at goal rate of 60ml with q4h 60 ml FWF.  Activity:  Assist of 1-2 with cares, lift.  Pain: At acceptable level on current regimen. Given PRN atarax and dilaudid x2.  Skin: Periarea red. Barrier cream applied, no brief in bed.  LDA's: L drain (irrigated q4h), G/J tube - G to gravity drainage and J w/continuous tube feeds, LUE PICC, R chest HD access.    Scored pt a 0 on the Section score for all checks throughout night. No acute events.    Plan: Continue with POC. Notify primary team with changes.    "

## 2021-08-12 NOTE — CONSULTS
"    Interventional Radiology Consult Service Note    Consult Request: Cap nec panc percutaneous drain    History:  Patient is a 30 year old male with a history of heavy alcohol use who presented for abdominal pain, nausea and vomiting, admitted for alcohol hepatitis, ESLD, RADHA on HD, septic shock requiring pressors as well as necrotizing pancreatitis with large evolving necrotic collection. Now s/p EUS necrosectomy with cystgastrostomy stenting. IR placed left 14 Fr thal quick retroperitoneal drain 6/30/21. Plan for repeat EGD/necrostectomy tomorrow. Drain is still having outputs about 200 ml per day but in close proximity to cystgastrostomy and likely to drain internally. Request for IR opinion on drain capping trial.    Imaging Reviewed:  CTA chest abdomen pelvis with contrast 8/10/2021 1:16 PM  Impression:  1. No acute pulmonary embolus.  2. Aorta and major arteries are patent and normal in caliber.  3. Decreased large right pleural effusion with continued complete  atelectasis of the right lower lobe but improved aeration of the right  upper lobe and right middle lobe since 8/8/2021.  4. Patchy opacities in the right upper lobe and right middle lobe,  sequela of reexpansion versus superimposed infection.  5. Decreased size of walled off necrotic collections in the upper  abdomen with unchanged position of drains.  6. Stable hepatosplenomegaly.  7. No significant renal cortical enhancement in the arterial phase,  consistent with known kidney injury.  8. Stable mesenteric edema, ascites, and anasarca.    Vitals:   /72 (BP Location: Right arm)   Pulse 106   Temp 97.8  F (36.6  C) (Oral)   Resp 18   Ht 1.676 m (5' 5.98\")   Wt 67.5 kg (148 lb 13 oz)   SpO2 97%   BMI 24.03 kg/m      Pertinent Labs:     Lab Results   Component Value Date    WBC 16.5 (H) 08/12/2021    WBC 17.0 (H) 08/11/2021    WBC 16.8 (H) 08/10/2021    WBC 25.8 (H) 07/11/2021    WBC 23.6 (H) 07/10/2021    WBC 24.7 (H) 07/09/2021       Lab " Results   Component Value Date    HGB 7.5 08/12/2021    HGB 8.2 08/11/2021    HGB 7.9 08/10/2021    HGB 7.5 07/11/2021    HGB 7.2 07/10/2021    HGB 6.4 07/10/2021       Lab Results   Component Value Date     08/12/2021     08/11/2021     08/10/2021     07/11/2021     07/10/2021     07/09/2021       Lab Results   Component Value Date    INR 1.74 (H) 08/08/2021    INR 1.91 (H) 07/11/2021    PTT 97 (H) 06/28/2021       Lab Results   Component Value Date    POTASSIUM 4.0 08/12/2021    POTASSIUM 4.6 07/11/2021        Plan:  Patient's left lateral drain capped today by IR. Recommend clinical follow up and repeated imaging in 1 week to see if patient has passed capping trail. If patient develops leakage, fevers, chills, pain recommend reconnecting to gravity and CT imaging. If he passes trial, may then move toward consideration of drain removal.    Raegan Guillen PA-C  Interventional Radiology  Pager: [phone]

## 2021-08-12 NOTE — PROGRESS NOTES
Nephrology Progress Note  08/12/2021         Dax Villareal is a 30 year old male with h/o ETOH hepatitis, end stage liver disease, RADHA on HD, transferred from [facility] in [place] for septic shock on 6/28/21 for treatment of necrotizing pancreatitis and decompensated liver disease. He was initially admitted to [facility] 5/19/21.        Interval History :   Mr Villareal had necrosectomy yesterday in OR, planning on HD today.  Doing better overall as his appetite is better and his abdominal pain is improved allowing him to be more active.  We do not anticipate renal recovery as he has been on HD nearly 3 months but has been relatively stable on 3x/week schedule.        Assessment & Recommendations:   RADHA-Cr was 0.8 as recently as 5/19/2021 but now HD dependent, started RRT at [facility] prior to transfer to [facility] (exact date unclear but per family it was end of May).  Etiology likely multifactorial with contrast, sepsis/pancreatitis, likely HRS physiology contributing as well.  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  Continuing RRT as long as it is tolerated with regards to BP's, is functional enough to possibly do outpt HD.  MELD is in high 30's so has high 3 month mortality but continuing restorative cares for now.  Had GI stenting of pancreas x3.                 -Line is TDC from PTA               - HD on TTS schedule unless issues arise.      Volume status-Anuric, has high volumes from pancreatic and pleural drains, pulling UF as able with runs.       Electrolytes/pH-K 4.0, bicarb 21, no acute issues.       Ca/phos/pth-Ca 7.6, corrects to normal with low albumin.  Mg and Phos reasonable.       Anemia-Hgb low, acute management per team, last PRBC's 7/14.  Started on EPO 5k with runs, iron sats 43%      Nutrition-Taking PO, appetite reasonable.       Seen and discussed with Dr Dow      Recommendations were communicated to primary team via note       SOFIA Marlow CNS  Clinical Nurse  "Specialist  [phone]    Review of Systems:   I reviewed the following systems:  Gen: No fevers or chills  CV: No CP at rest  Resp: No SOB at rest  GI: No N/V    Physical Exam:   I/O last 3 completed shifts:  In: 3135 [P.O.:360; I.V.:700; Other:500; NG/GT:425]  Out: 890 [Emesis/NG output:840; Drains:150]   /72 (BP Location: Right arm)   Pulse 106   Temp 97.8  F (36.6  C) (Oral)   Resp 18   Ht 1.676 m (5' 5.98\")   Wt 67.5 kg (148 lb 13 oz)   SpO2 97%   BMI 24.03 kg/m       GENERAL APPEARANCE: Sitting at edge of bed, in no distress.   EYES:  scleral icterus, pupils equal  Pulmonary: lungs clear  CV: tachy   - Edema - no LE edema  GI: large distended, tender. Has retroperitoneal drain  MS: no evidence of inflammation in joints, no muscle tenderness  SKIN: no rash, warm, dry, no cyanosis  NEURO: alert/interactive  ACCESS: Right TDC       Labs:   All labs reviewed by me  Electrolytes/Renal - Recent Labs   Lab Test 08/12/21 0528 08/11/21  0729 08/11/21  0555 08/10/21  0631   *  --  128* 123*   POTASSIUM 4.0  --  3.7 3.6   CHLORIDE 96  --  97 91*   CO2 21  --  22 19*   BUN 28  --  12 30   CR 2.78*  --  2.14* 3.81*   * 94 85 142*   JANENE 7.6*  --  8.0* 7.6*   MAG 2.0  --  1.7 2.3   PHOS 4.1  --  3.2 6.0*       CBC -   Recent Labs   Lab Test 08/12/21 0528 08/11/21  0555 08/10/21  0631   WBC 16.5* 17.0* 16.8*   HGB 7.5* 8.2* 7.9*    274 298       LFTs -   Recent Labs   Lab Test 08/12/21 0528 08/11/21  0555 08/10/21  0631   ALKPHOS 109 109 110   BILITOTAL 2.0* 2.2* 2.2*   ALT 27 16 18   AST 68* 26 31   PROTTOTAL 6.7* 6.9 6.7*   ALBUMIN 1.6* 1.6* 1.4*       Iron Panel -   Recent Labs   Lab Test 07/19/21  0632   IRON 31*   IRONSAT 43           Current Medications:   sodium chloride 0.9%  250 mL Intravenous Once in dialysis/CRRT    sodium chloride 0.9%  300 mL Hemodialysis Machine Once    sodium bicarbonate  325 mg Per Feeding Tube Q4H    And    amylase-lipase-protease  1-2 capsule Per Feeding " Tube Q4H    amylase-lipase-protease  2 capsule Oral TID w/meals    B and C vitamin Complex with folic acid  5 mL Per Feeding Tube Daily    [Held by provider] banatrol plus  1 packet Per Feeding Tube TID    ceFEPIme (MAXIPIME) IV  1 g Intravenous Q24H    epoetin charles-epbx (RETACRIT) inj ESRD  5,000 Units Intravenous Once in dialysis/CRRT    folic acid  1 mg Oral Daily    [Held by provider] heparin ANTICOAGULANT  5,000 Units Subcutaneous Q12H    HYDROmorphone (STANDARD CONC)  2 mg Oral Q8H    melatonin  6 mg Oral At Bedtime    metroNIDAZOLE  500 mg Intravenous Q12H    sodium chloride (PF)  100 mL Irrigation Q4H LENKA    sodium chloride (PF)  3 mL Intracatheter Q8H    sodium chloride (PF)  3 mL Intracatheter Q8H    sodium chloride (PF)  73 mL Intravenous Once    sodium chloride (PF)  9 mL Intracatheter During Dialysis/CRRT (from stock)    sodium chloride (PF)  9 mL Intracatheter During Dialysis/CRRT (from stock)    vitamin B1  100 mg Oral Daily    Or    thiamine  100 mg Intravenous Daily      - MEDICATION INSTRUCTIONS -      dextrose      [Held by provider] heparin (porcine)         I have seen and examined this patient with the NP.  This note reflects our joint assessment and plan.     Yu Dow MD

## 2021-08-12 NOTE — PROGRESS NOTES
GASTROENTEROLOGY PROGRESS NOTE      Date of Admission: 6/28/2021  Reason for Admission: pancreatitis/hepatitis    ASSESSMENT/RECOMMENDATIONS:  30 year old male with a history of heavy alcohol use who presented to OSH 5/19 for abdominal pain, nausea and vomiting, admitted for alcohol hepatitis, ESLD, RADHA on HD, septic shock requiring pressors as well as necrotizing pancreatitis with large evolving necrotic collection. Transferred to [facility] for consideration of drainage of presumed infected necrotic collection. S/p EUS-guided necrosectomy (7/21/21).     #. Acute necrotizing pancreatitis with presumed infected walled off necrotic collection s/p percutaneous and endoscopic drainage   #. Septic shock, resolved  #. Leukocytosis  #. Ascites/Peritonitis  Unclear evolution of pancreatitis history but had a recent CT scan with large evolving necrotic collection, presumed infected. No obvious gas in collection. CT on admission to [facility] showing mature collection with enhancing wall measuring ~[id number]. With profound coagulopathy (liver disease + malnutrition +sepsis), INR 3.8 on transfer he was given FFP and Vit K, IR placed perc drain 6/29. Repeat imaging 7/18 with still large walled off necrotic collection that appeared amenable to endoscopic transluminal drainage, underwent cystgastrostomy stenting on 7/21/21, near complete debridement/necrosectomy 8/6 with plans for final debridement later this week.  Etiology: alcohol  Date of onset: 5/19  BISAP score on admission: unclear  Concurrent organ failure: respiratory (now extubated), renal (on HD), liver, cardiogenic (weaned off pressors 6/29)  Thromboses: none  Diabetes: no  Current nutrition access: PEG-J + po diet  Last imaging: CT scan with IV contrast 8/10  Infection/antiinfectives:                 - Meropenem (6/29 - 7/18), Ciprofloxacin (7/19 - 7/21), Vancomycin (6/29 - 6/30), Micafungin (6/29 - 7/1), Ceftriaxone (7/21 - 7/23), Linezolid (7/21 - 7/30), Zosyn  (7/27-8/6), Cefepime (8/6 - present), Metronidazole (8/6 - present)    Intervention:    6/30 - Left IR RP perc drain (24F)    7/2 - Diagnostic paracentesis (WBC 2663 - 74% PMN, SAAG <1.1, protein 4.1)     7/5 - Repeat Paracentesis (WBC 1801 - 61% PMN, lipase 48)    7/13 - Diag/therapeutic paracentesis ()    7/13 - PEG-J placement with T-tag gastropexy    7/21 - Repeat paracentesis    7/21 - EUS-cystgastrostomy and necrosectomy, Axios with co-axial Viabil    7/28 - EGD, stent replacement (transluminal Axios with coaxial Solus), PEG-J removal (buried bumper) and replacement of new PEG-J with one new T-tag    8/6 - EGD with necrosectomy and near complete necrosectomy    8/11 - EGD with completion necrosectomy, no e/o bowel fistula    - No further necrosectomies anticipated  - Consult IR re: capping perc drain  - PRN paracentesis  - Continue antibiotics until drain removed  - pain meds per primary team    #. Hematochezia, resolved  Two episodes of BRBPR 8/8-8/9. Likely related to oozing after necrosectomy in the setting of new anticoagulation for questionable PE (CT Chest 8/8 with concern for PE). Hgb stable. Bleeding stopped after anticoagulation was stopped. Would continue to hold anticoagulation for now. Hemodynamics unchanged. CTA 8/10 without PE  - Follow H/H  - Hold AC for now    #. Severe malnutrition in the context of chronic illness  #. Gastric outlet obstruction s/p PEG-J placement  #. Mild to moderate EPI  #. Concern for bowel fistula  Patient with very poor oral intake over the course of his illness and was not meeting calorie requirements orally. S/p PEG-J with T-tag gastropexy (x3) 7/13. Overall improved. Had one day of very large output from perc drain that appeared to look like tube feeds -- no e/o bowel fistula on fluroscopy.   - G tube to gravity   - Diet as tolerated  - Continue tube feeds with PERT    #. Alcohol hepatitis  #. Hyperbilirubinemia  #. Coagulopathy  #. Encephalopathy,  "improved  Known heavy alcohol use. Reportedly received course of steroids at OSH for 1 week with \"some improvement\". Currently has primarily hyperbilirubinemia and mildly elevated AST. Unclear if he has underlying cirrhosis but certainly would fit with alcohol hepatitis vs elevated liver tests of critical illness/sepsis. Not candidate for steroids right now. He is receiving lactulose and Xifaxin for encephalopathy. Hepatology notified of admission and they have weighed in on liver transplant status 7/22 (Makayla Glass APRN CNP)  - trend MELD labs  - alcohol abstinence    GI AE will continue to follow, call with questions    The patient was seen and discussed and plan agreed upon with GI staff, Dr. Ramos Norwood PA-C  Advanced Endoscopy/Pancreaticobiliary GI Service  [facility]  Text Page  ____________________________________________      Subjective: Nursing notes and 24hr events reviewed. NO new complaints. Reports that his abdomen \"feels wonderful\". Denies abdominal pain. Tolerating general diet without vomiting.    ROS:   4 pt ROS negative unless noted in subjective.     Objective:  Blood pressure 114/72, pulse 106, temperature 97.8  F (36.6  C), temperature source Oral, resp. rate 18, height 1.676 m (5' 5.98\"), weight 67.5 kg (148 lb 13 oz), SpO2 97 %.  Gen: Laying in bed. Appears comfortable  HEENT: NCAT. Conjunctiva clear. Sclera + mild icteric, poor dentition  Resp: non labored breathing  Abd: mild distention, thick yellow paste in perc drain, GJ tube with tube feeds through J and minimal bilious output in G tube  Skin: + mild jaundice  Ext: warm, well perfused  Mental status/Psych: alert and oriented    LABS:  BMP  Recent Labs   Lab 08/12/21  0528 08/11/21  0729 08/11/21  0555 08/10/21  0631 08/09/21  0326   *  --  128* 123* 126*   POTASSIUM 4.0  --  3.7 3.6 3.6   CHLORIDE 96  --  97 91* 93*   JANENE 7.6*  --  8.0* 7.6* 7.8*   CO2 21  --  22 19* 24   BUN 28  --  12 " 30 19   CR 2.78*  --  2.14* 3.81* 2.81*   * 94 85 142* 139*     CBC  Recent Labs   Lab 08/12/21  0528 08/11/21  0555 08/10/21  0631 08/09/21  1312   WBC 16.5* 17.0* 16.8* 15.1*   RBC 2.54* 2.74* 2.65* 2.76*   HGB 7.5* 8.2* 7.9* 8.3*   HCT 22.3* 23.9* 23.0* 24.8*   MCV 88 87 87 90   MCH 29.5 29.9 29.8 30.1   MCHC 33.6 34.3 34.3 33.5   RDW 16.4* 16.4* 15.9* 16.4*    274 298 290     INR  Recent Labs   Lab 08/08/21  0509 08/06/21  1002   INR 1.74* 1.68*     LFTs  Recent Labs   Lab 08/12/21  0528 08/11/21  0555 08/10/21  0631 08/09/21  0326   ALKPHOS 109 109 110 121   AST 68* 26 31 41   ALT 27 16 18 22   BILITOTAL 2.0* 2.2* 2.2* 2.5*   PROTTOTAL 6.7* 6.9 6.7* 7.1   ALBUMIN 1.6* 1.6* 1.4* 1.6*      PANC  No lab results found in last 7 days.      IMAGING:  Reviewed most recent imaging

## 2021-08-13 ENCOUNTER — APPOINTMENT (OUTPATIENT)
Dept: CARDIOLOGY | Facility: CLINIC | Age: 31
End: 2021-08-13
Attending: INTERNAL MEDICINE
Payer: MEDICAID

## 2021-08-13 ENCOUNTER — APPOINTMENT (OUTPATIENT)
Dept: CT IMAGING | Facility: CLINIC | Age: 31
End: 2021-08-13
Attending: INTERNAL MEDICINE
Payer: MEDICAID

## 2021-08-13 LAB
ALBUMIN SERPL-MCNC: 1.6 G/DL (ref 3.4–5)
ALP SERPL-CCNC: 122 U/L (ref 40–150)
ALT SERPL W P-5'-P-CCNC: 32 U/L (ref 0–70)
ANION GAP SERPL CALCULATED.3IONS-SCNC: 7 MMOL/L (ref 3–14)
AST SERPL W P-5'-P-CCNC: 83 U/L (ref 0–45)
BILIRUB SERPL-MCNC: 2 MG/DL (ref 0.2–1.3)
BUN SERPL-MCNC: 17 MG/DL (ref 7–30)
CALCIUM SERPL-MCNC: 7.8 MG/DL (ref 8.5–10.1)
CHLORIDE BLD-SCNC: 97 MMOL/L (ref 94–109)
CO2 SERPL-SCNC: 26 MMOL/L (ref 20–32)
CREAT SERPL-MCNC: 1.76 MG/DL (ref 0.66–1.25)
ERYTHROCYTE [DISTWIDTH] IN BLOOD BY AUTOMATED COUNT: 16.5 % (ref 10–15)
GFR SERPL CREATININE-BSD FRML MDRD: 50 ML/MIN/1.73M2
GLUCOSE BLD-MCNC: 111 MG/DL (ref 70–99)
HCT VFR BLD AUTO: 25.9 % (ref 40–53)
HGB BLD-MCNC: 8.5 G/DL (ref 13.3–17.7)
LACTATE SERPL-SCNC: 2.2 MMOL/L (ref 0.7–2)
LVEF ECHO: NORMAL
MAGNESIUM SERPL-MCNC: 1.6 MG/DL (ref 1.6–2.3)
MCH RBC QN AUTO: 29.8 PG (ref 26.5–33)
MCHC RBC AUTO-ENTMCNC: 32.8 G/DL (ref 31.5–36.5)
MCV RBC AUTO: 91 FL (ref 78–100)
PHOSPHATE SERPL-MCNC: 2.4 MG/DL (ref 2.5–4.5)
PLATELET # BLD AUTO: 298 10E3/UL (ref 150–450)
POTASSIUM BLD-SCNC: 4.1 MMOL/L (ref 3.4–5.3)
PROT SERPL-MCNC: 6.9 G/DL (ref 6.8–8.8)
RBC # BLD AUTO: 2.85 10E6/UL (ref 4.4–5.9)
SODIUM SERPL-SCNC: 130 MMOL/L (ref 133–144)
WBC # BLD AUTO: 20.2 10E3/UL (ref 4–11)

## 2021-08-13 PROCEDURE — 99233 SBSQ HOSP IP/OBS HIGH 50: CPT | Mod: GC | Performed by: STUDENT IN AN ORGANIZED HEALTH CARE EDUCATION/TRAINING PROGRAM

## 2021-08-13 PROCEDURE — 74178 CT ABD&PLV WO CNTR FLWD CNTR: CPT

## 2021-08-13 PROCEDURE — 258N000003 HC RX IP 258 OP 636: Performed by: STUDENT IN AN ORGANIZED HEALTH CARE EDUCATION/TRAINING PROGRAM

## 2021-08-13 PROCEDURE — 250N000013 HC RX MED GY IP 250 OP 250 PS 637: Performed by: INTERNAL MEDICINE

## 2021-08-13 PROCEDURE — 99231 SBSQ HOSP IP/OBS SF/LOW 25: CPT | Mod: GC | Performed by: INTERNAL MEDICINE

## 2021-08-13 PROCEDURE — 120N000002 HC R&B MED SURG/OB UMMC

## 2021-08-13 PROCEDURE — 71250 CT THORAX DX C-: CPT | Mod: 26 | Performed by: RADIOLOGY

## 2021-08-13 PROCEDURE — 99233 SBSQ HOSP IP/OBS HIGH 50: CPT | Performed by: CLINICAL NURSE SPECIALIST

## 2021-08-13 PROCEDURE — 250N000013 HC RX MED GY IP 250 OP 250 PS 637

## 2021-08-13 PROCEDURE — 36415 COLL VENOUS BLD VENIPUNCTURE: CPT | Performed by: INTERNAL MEDICINE

## 2021-08-13 PROCEDURE — 250N000011 HC RX IP 250 OP 636: Performed by: STUDENT IN AN ORGANIZED HEALTH CARE EDUCATION/TRAINING PROGRAM

## 2021-08-13 PROCEDURE — 84100 ASSAY OF PHOSPHORUS: CPT | Performed by: INTERNAL MEDICINE

## 2021-08-13 PROCEDURE — 87040 BLOOD CULTURE FOR BACTERIA: CPT | Performed by: STUDENT IN AN ORGANIZED HEALTH CARE EDUCATION/TRAINING PROGRAM

## 2021-08-13 PROCEDURE — 71250 CT THORAX DX C-: CPT

## 2021-08-13 PROCEDURE — 80053 COMPREHEN METABOLIC PANEL: CPT | Performed by: INTERNAL MEDICINE

## 2021-08-13 PROCEDURE — 250N000011 HC RX IP 250 OP 636: Performed by: INTERNAL MEDICINE

## 2021-08-13 PROCEDURE — 36415 COLL VENOUS BLD VENIPUNCTURE: CPT | Performed by: STUDENT IN AN ORGANIZED HEALTH CARE EDUCATION/TRAINING PROGRAM

## 2021-08-13 PROCEDURE — 83735 ASSAY OF MAGNESIUM: CPT | Performed by: INTERNAL MEDICINE

## 2021-08-13 PROCEDURE — 85027 COMPLETE CBC AUTOMATED: CPT | Performed by: INTERNAL MEDICINE

## 2021-08-13 PROCEDURE — 83605 ASSAY OF LACTIC ACID: CPT | Performed by: STUDENT IN AN ORGANIZED HEALTH CARE EDUCATION/TRAINING PROGRAM

## 2021-08-13 PROCEDURE — 93306 TTE W/DOPPLER COMPLETE: CPT

## 2021-08-13 PROCEDURE — 74178 CT ABD&PLV WO CNTR FLWD CNTR: CPT | Mod: 26 | Performed by: RADIOLOGY

## 2021-08-13 PROCEDURE — 93306 TTE W/DOPPLER COMPLETE: CPT | Mod: 26 | Performed by: INTERNAL MEDICINE

## 2021-08-13 PROCEDURE — G0463 HOSPITAL OUTPT CLINIC VISIT: HCPCS

## 2021-08-13 RX ORDER — IOPAMIDOL 755 MG/ML
91 INJECTION, SOLUTION INTRAVASCULAR ONCE
Status: COMPLETED | OUTPATIENT
Start: 2021-08-13 | End: 2021-08-13

## 2021-08-13 RX ADMIN — HYDROMORPHONE HYDROCHLORIDE 2 MG: 1 SOLUTION ORAL at 08:51

## 2021-08-13 RX ADMIN — HYDROXYZINE HYDROCHLORIDE 50 MG: 25 TABLET, FILM COATED ORAL at 18:15

## 2021-08-13 RX ADMIN — PANCRELIPASE 2 CAPSULE: 24000; 76000; 120000 CAPSULE, DELAYED RELEASE PELLETS ORAL at 11:15

## 2021-08-13 RX ADMIN — PANCRELIPASE 2 CAPSULE: 24000; 76000; 120000 CAPSULE, DELAYED RELEASE PELLETS ORAL at 00:27

## 2021-08-13 RX ADMIN — THIAMINE HCL TAB 100 MG 100 MG: 100 TAB at 08:51

## 2021-08-13 RX ADMIN — PANCRELIPASE 2 CAPSULE: 24000; 76000; 120000 CAPSULE, DELAYED RELEASE PELLETS ORAL at 20:08

## 2021-08-13 RX ADMIN — ONDANSETRON 4 MG: 2 INJECTION INTRAMUSCULAR; INTRAVENOUS at 18:16

## 2021-08-13 RX ADMIN — SODIUM BICARBONATE 325 MG: 325 TABLET ORAL at 20:08

## 2021-08-13 RX ADMIN — PROCHLORPERAZINE EDISYLATE 5 MG: 5 INJECTION INTRAMUSCULAR; INTRAVENOUS at 22:48

## 2021-08-13 RX ADMIN — PANCRELIPASE 2 CAPSULE: 24000; 76000; 120000 CAPSULE, DELAYED RELEASE PELLETS ORAL at 18:17

## 2021-08-13 RX ADMIN — SODIUM BICARBONATE 325 MG: 325 TABLET ORAL at 04:10

## 2021-08-13 RX ADMIN — HYDROMORPHONE HYDROCHLORIDE 1 MG: 1 SOLUTION ORAL at 20:06

## 2021-08-13 RX ADMIN — IOPAMIDOL 91 ML: 755 INJECTION, SOLUTION INTRAVENOUS at 12:27

## 2021-08-13 RX ADMIN — HYDROMORPHONE HYDROCHLORIDE 2 MG: 1 SOLUTION ORAL at 00:27

## 2021-08-13 RX ADMIN — SODIUM CHLORIDE, POTASSIUM CHLORIDE, SODIUM LACTATE AND CALCIUM CHLORIDE 1000 ML: 600; 310; 30; 20 INJECTION, SOLUTION INTRAVENOUS at 09:00

## 2021-08-13 RX ADMIN — METRONIDAZOLE 500 MG: 500 INJECTION, SOLUTION INTRAVENOUS at 00:23

## 2021-08-13 RX ADMIN — SODIUM BICARBONATE 325 MG: 325 TABLET ORAL at 16:27

## 2021-08-13 RX ADMIN — FOLIC ACID 1 MG: 1 TABLET ORAL at 08:51

## 2021-08-13 RX ADMIN — HYDROMORPHONE HYDROCHLORIDE 1 MG: 1 SOLUTION ORAL at 04:14

## 2021-08-13 RX ADMIN — PANCRELIPASE 2 CAPSULE: 24000; 76000; 120000 CAPSULE, DELAYED RELEASE PELLETS ORAL at 16:27

## 2021-08-13 RX ADMIN — HYDROMORPHONE HYDROCHLORIDE 2 MG: 1 SOLUTION ORAL at 16:26

## 2021-08-13 RX ADMIN — MELATONIN TAB 3 MG 6 MG: 3 TAB at 01:11

## 2021-08-13 RX ADMIN — ONDANSETRON 4 MG: 2 INJECTION INTRAMUSCULAR; INTRAVENOUS at 11:51

## 2021-08-13 RX ADMIN — PANCRELIPASE 2 CAPSULE: 24000; 76000; 120000 CAPSULE, DELAYED RELEASE PELLETS ORAL at 04:11

## 2021-08-13 RX ADMIN — SODIUM BICARBONATE 325 MG: 325 TABLET ORAL at 11:18

## 2021-08-13 RX ADMIN — SODIUM BICARBONATE 325 MG: 325 TABLET ORAL at 00:27

## 2021-08-13 RX ADMIN — BENZONATATE 100 MG: 100 CAPSULE ORAL at 22:40

## 2021-08-13 RX ADMIN — GUAIFENESIN 10 ML: 200 SOLUTION ORAL at 01:11

## 2021-08-13 RX ADMIN — Medication 5 ML: at 08:51

## 2021-08-13 RX ADMIN — CEFEPIME HYDROCHLORIDE 1 G: 1 INJECTION, POWDER, FOR SOLUTION INTRAMUSCULAR; INTRAVENOUS at 20:06

## 2021-08-13 RX ADMIN — PANCRELIPASE 2 CAPSULE: 24000; 76000; 120000 CAPSULE, DELAYED RELEASE PELLETS ORAL at 08:51

## 2021-08-13 RX ADMIN — HYDROXYZINE HYDROCHLORIDE 50 MG: 25 TABLET, FILM COATED ORAL at 04:13

## 2021-08-13 RX ADMIN — METRONIDAZOLE 500 MG: 500 INJECTION, SOLUTION INTRAVENOUS at 12:47

## 2021-08-13 RX ADMIN — HYDROXYZINE HYDROCHLORIDE 50 MG: 25 TABLET, FILM COATED ORAL at 11:14

## 2021-08-13 RX ADMIN — GUAIFENESIN 10 ML: 200 SOLUTION ORAL at 06:30

## 2021-08-13 RX ADMIN — GUAIFENESIN 10 ML: 200 SOLUTION ORAL at 17:14

## 2021-08-13 RX ADMIN — SODIUM BICARBONATE 325 MG: 325 TABLET ORAL at 08:51

## 2021-08-13 ASSESSMENT — ACTIVITIES OF DAILY LIVING (ADL)
ADLS_ACUITY_SCORE: 17
ADLS_ACUITY_SCORE: 17
DEPENDENT_IADLS:: INDEPENDENT
ADLS_ACUITY_SCORE: 17

## 2021-08-13 ASSESSMENT — MIFFLIN-ST. JEOR: SCORE: 1642.75

## 2021-08-13 NOTE — PROGRESS NOTES
Care Management Initial Consult    General Information  Assessment completed with: VM-chart review,    Type of CM/SW Visit: Initial Assessment    Primary Care Provider verified and updated as needed: No   Readmission within the last 30 days: no previous admission in last 30 days   Reason for Consult: discharge planning  Advance Care Planning: Advance Care Planning Reviewed: no concerns identified       Communication Assessment  Patient's communication style: spoken language (English or Bilingual)    Hearing Difficulty or Deaf: no   Wear Glasses or Blind: no    Cognitive  Cognitive/Neuro/Behavioral: WDL  Level of Consciousness: alert  Arousal Level: opens eyes spontaneously  Orientation: oriented x 4  Mood/Behavior: calm, cooperative  Best Language: 0 - No aphasia  Speech: slow    Living Environment:   People in home: alone     Current living Arrangements: apartment      Able to return to prior arrangements: yes    Family/Social Support:  Care provided by: self  Provides care for: no one  Marital Status: Single  Sibling(s)          Description of Support System: Supportive, Involved    Support Assessment: Adequate family and caregiver support    Current Resources:   Patient receiving home care services: No  Community Resources: None  Equipment currently used at home: none  Supplies currently used at home: None    Employment/Financial:  Employment Status:  (Unknown)     Financial Concerns: No concerns identified     Lifestyle & Psychosocial Needs:  Social Determinants of Health     Tobacco Use: High Risk     Smoking Tobacco Use: Current Every Day Smoker     Smokeless Tobacco Use: Never Used   Alcohol Use:      Frequency of Alcohol Consumption:      Average Number of Drinks:      Frequency of Binge Drinking:    Financial Resource Strain:      Difficulty of Paying Living Expenses:    Food Insecurity:      Worried About Running Out of Food in the Last Year:      Ran Out of Food in the Last Year:    Transportation Needs:   "    Lack of Transportation (Medical):      Lack of Transportation (Non-Medical):    Physical Activity:      Days of Exercise per Week:      Minutes of Exercise per Session:    Stress:      Feeling of Stress :    Social Connections:      Frequency of Communication with Friends and Family:      Frequency of Social Gatherings with Friends and Family:      Attends Anabaptism Services:      Active Member of Clubs or Organizations:      Attends Club or Organization Meetings:      Marital Status:    Intimate Partner Violence:      Fear of Current or Ex-Partner:      Emotionally Abused:      Physically Abused:      Sexually Abused:    Depression: Not at risk     PHQ-2 Score: 0   Housing Stability:      Unable to Pay for Housing in the Last Year:      Number of Places Lived in the Last Year:      Unstable Housing in the Last Year:      Functional Status:  Prior to admission patient needed assistance:   Dependent ADLs:: Independent  Dependent IADLs:: Independent  Assesssment of Functional Status: Not at baseline with ADL Functioning    Mental Health Status:  Mental Health Status:  (Unknown)       Chemical Dependency Status:  Chemical Dependency Status: Current Concern         Values/Beliefs:  Spiritual, Cultural Beliefs, Anabaptism Practices, Values that affect care: yes  Description of Beliefs that Will Affect Care: Pt requests        Additional Information:  Per H&P, pt is a \"30 year old male with PMHx significant for alcohol use disorder who has had a prolonged hospitalization at  in [place] for acute alcohol hepatitis/end stage liver disease complicated by hepatic encephalopathy, acute renal failure requiring hemodialysis, acute hypoxemic respiratory failure and septic shock secondary to acute necrotizing pancreatitis. Transferred to [facility] ICU on 6/28/2021 for further management of necrotizing pancreatitis by interventional GI, possible necrosectomy\". SW completed assessment per chart review. " SW attempted to meet with pt but pt was with providers. SW to continue following for discharge and support.     RONNA Brice, LGSW  6B   [facility]  Phone: [phone]  Pager: [phone]

## 2021-08-13 NOTE — CONSULTS
[facility] Nurse Inpatient Wound Assessment   Reason for consultation: Evaluate and treat  Peg tube wounds    Assessment  Peg tube wounds due to Moisture Associated Skin Damage (MASD)  Status: initial assessment    Treatment Plan  Peg tube wounds: Daily    Cleanse wound bed with NS and pat dry.  Paint gera tube area with No sting film barrier  Cut a piece of Hydrofera blue (#[id number]) and split in the middle. Damp it with NS and ring the excess  Place it under the tube. DO not add more dressing under the bumper, may add gauze on top of the bumper if needed  Change dressing daily.    Orders Written  Recommended provider order: None, at this time  WOC Nurse follow-up plan:weekly  Nursing to notify the Provider(s) and re-consult the WOC Nurse if wound(s) deteriorates or new skin concern.    Patient History  According to provider note(s):  30 year old male with a history of heavy alcohol use who presented to OSH 5/19 for abdominal pain, nausea and vomiting, admitted for alcohol hepatitis, ESLD, RADHA on HD, septic shock requiring pressors as well as necrotizing pancreatitis with large evolving necrotic collection. Transferred to [facility] for consideration of drainage of presumed infected necrotic collection. S/p EUS-guided necrosectomy (7/21/21).    Objective Data  Containment of urine/stool: Continent of bladder and Continent of bowel    Active Diet Order  Orders Placed This Encounter      Regular Diet Adult Thin Liquids (level 0)      Output:   I/O last 3 completed shifts:  In: 1666 [I.V.:206; Other:200; NG/GT:60]  Out: 1875 [Urine:50; Emesis/NG output:125; Other:1700]    Risk Assessment:   Sensory Perception: 4-->no impairment  Moisture: 3-->occasionally moist  Activity: 2-->chairfast  Mobility: 2-->very limited  Nutrition: 2-->probably inadequate  Friction and Shear: 3-->no apparent problem  Tyrel Score: 16                          Labs: Recent Labs   Lab 08/13/21  0554 08/08/21  2241 08/08/21  0509 08/07/21  0637   ALBUMIN 1.6*    < > 1.6* 1.7*   HGB 8.5*  --  8.0* 6.9*   INR  --   --  1.74*  --    WBC 20.2*  --  18.8* 17.5*   CRP  --   --   --  81.0*    < > = values in this interval not displayed.       Physical Exam  Areas of skin assessed: focused peg tube site    Wound Location:  Peg tube   Date of last photo Pt declined  Wound History:  7/13 - PEG-J placement with T-tag gastropexy    Wound Base: 100 % pale moist pink tissue at 9 O'clock     Palpation of the wound bed: normal      Drainage: moderate     Description of drainage: cloudy and tan     Measurements (length x width x depth, in cm) approximately 0.5  x 1.2  x  0.1 cm (pt declined to have WOC measure the wound)     Tunneling N/A     Undermining N/A  Periwound skin: erythema- blanchable and irritated      Color: pink      Temperature: normal   Odor: none  Pain: moderate and with assessment,   Pain intervention prior to dressing change: NA    Interventions  Visual inspection and assessment completed   Wound Care Rationale Protect periwound skin, Promote moist wound healing without tissue dehydration  and Decrease bacterial load  Wound Care: completed by RN- pt declined at the time  Supplies: ordered: Hydrofera blue  Current off-loading measures: Pillows  Current support surface: Standard  Low air loss mattress  Education provided to: plan of care and wound progress  Discussed plan of care with Patient and Nurse    Deneen Matthews RN

## 2021-08-13 NOTE — PLAN OF CARE
Neuro: A/O. Some possible stm concerns.   Cardiac: Sinus Tac 120's. Mapsabovegoal.   Respiratory:  2L nc. Increased coughing after fluid intake. No changes in O2 demands.   GI/: BM x 2.  Diet/appetite:  Coughing with PO intake  Activity:  Turning self.   Pain: 0 s/s of.  Skin: Unchanged.   LDA's: PICC x 3 Lumens all SL. CVC for HD.      Plan: Continue to monitor.

## 2021-08-13 NOTE — PROGRESS NOTES
[facility]  Progress Note - [place] 3 Service        Date of Admission:  6/28/2021    Assessment & Plan   Dax Villareal is a 30 year old male with hx of alcohol use disorder admitted on 6/28/2021 after recent prolonged admission at [facility] in [place] who has severe acute alcoholic hepatitis c/b HE, ESRD requiring HD, and malnutrition in the setting of acute necrotizing pancreatitis, s/p PEG-J placement on 7/13. He arrived with acute respiratory failure and septic shock that have resolved, and his secondary bacterial peritonitis has been appropriately treated. He requires continued management for ESRD, hepatic encephalopathy, infected pancreatic pseudocyst, nutritional support via PEG-J, and persistent right pleural effusion. Clinically improving after multiple necrosectomies.     Today:  - CT Chest/Abdomen/Pelvis today for hypotension showed new hepatic abscess in segment 2. IR consulted. GI recommending repeat paracentesis, thoracentesis, blood cultures. Continue IV Abx  - Hypotensive and responded to 1L of LR  - Added Calorie Counts given improving appetite     # Necrotizing alcoholic pancreatitis w/ infected pseudocyst s/p RP Drain and Cystogastrostomy  # Septic Shock, resolved   # Leukocytosis    Admitted to the [facility] ICU on 6/28/2021 from [facility] in [place] for septic shock due to necrotizing pancreatitis. CT on admit showing mature collection with enhancing wall measuring ~[id number]. IR placed perc drain 6/29. Repeat imaging 7/18 revealed walled off necrotic collection amenable to endoscopic transluminal drainage with cystgastrostomy stenting with necrosectomy on 7/21/21.  7/28 - EGD, stent replacement (transluminal Axios with coaxial Solus), PEG-J removal (buried bumper) and replacement of new PEG-J with one new T-tag. Repeat necrosectomy and Axios removal/replacement Friday 8/6. Repeat CT on 8/9. 8/11 - EGD with completion necrosectomy, no e/o bowel  "fistula, no plans for further necrosectomy unless further pain/fevers warrants further evaluation     - Continue Cefepime 1 gm Q 24 + flagyl, previously recommended to continue through final debridement, will reevaluate as pt progresses clinically  - Daily CBC, watching fever curve  - GI following, appreciate recs              - Pancreatic enzymes   - Flush drain with 100ml q4hr per GI   - Blood cultures and fluid cultures NGTD    # Secondary Bacterial Peritonitis 2/2 infected pseudocyst  # Peripancreatic fluid collection growing VRE   7/2 - Diagnostic paracentesis (WBC 2663 - 74% PMN, SAAG <1.1, protein 4.1)     Antibiotics:   Current: Cefepime 1 gm Q 24 + flagyl 500 q8 (8/6-present)     Past:  - Linezolid 7/21-7/30  - Vancomcyin 6/29  - Meropenem 6/29-7/18  - Micafungin 6/29-7/1  - Cipro 7/19-7/21  - Ceftriaxone 7/21-7/24; 7/26-7/27  - Zosyn 7/27-8/6    - Paracentesis PRN      # Right hepatic/pancreatic hydrothorax  Intermittently increased subjective shortness of breath. CXR on 7/19 w/ increased RLL haziness; ddx includes pleural effusion v pneumonia v atelectasis. CT on 7/28 showed large volume R hepatic hydrothorax with complete atelectasis of right lung, bedside thora completed 7/28 with labs and cultures.     CXR 7/30 showed moderate R pleural effusion, unchanged from previous and improved aeration of right lung with small remaining pleural effusion on CT. Thora cultures negative. Repeat Thora on 8/9 showing exudative fluid     - IR completed thoracentesis 7/28 - exudative  - ID following: agree to continue with empiric abx, though pleural effusion most likely 2/2 hepatic hydrothorax and pancreatitis. Defer plan for repeat thoracentesis based on symptoms and oxygen requirement. No plan for chest tube given concern for infected pleural effusion  - Supplemental oxygen as needed to keep sats above 89%  - Repeat Thoracentesis PRN for dyspnea    #Hematochezia  Patient CTA-Chest on 8/8 with \"possible\" " non-obstructive PE in [facility] Pulmonary Artery Branch. Patient has largely been bed bound and not on DVT prophylaxis for several days, especially in setting of recent procedures. Troponin negative x2. BRBPR with heparin. Ultrasounds negative for DVT.  - CTA-Chest on 8/10 negative for PE  - Hematology recommends starting prophylactic dose anticoagulation once stable x1 week, stepping up to unfractionated heparin, likely will need 3 months of anticoagulation   - Heme to be contacted prior to discharge when ready to transition to orals    # Lactic acidosis  Multifactorial d/t decreased lactic clearance in the setting of acute hepatic failure, ESRD, and acute on chronic infection.  - Continue to trend and assess vitals     # Gastric Outlet Obstruction  High G tube output due to the cystogastric connection/fluid drainage noted on 8/5. Per GI, possibly related to gastric outlet from severe abdominal inflammation and expected. KUB negative for obstructive gas pattern. Ileus from narcotics considered, but output not consistent with tube feeds. Improved after cystogastrectomy  -Anti-emetics  -Will monitor and continue to discuss with GI  -Having fluid removal per nephrology due to HD    #Hypokalemia  High GI output state. Replete as needed with ESRD noted. Improved with decreased G-tube drainage after cystogastrectomy.    # Severe malnutrition in setting of chronic illness  PEG-J placed 7/13. Patient intermittently refusing tube feeds d/t nausea/vomiting.  - Dietitian consult for new tube feeds, appreciate recs   - Continuous TF per dietician order (advancing VERY slowly) with goal of 65 ml/hr              - Cont panc enzymes   - Will trial regular diet with level 0 liquids as below  - Regular Diet, thin liquids per SLP  - VFSS for 8/10 to evaluate for dysphagia - cleared for level 0 liquids though vomited following  - Continue multivitamins  - Pantoprazole 40 mg IV daily  - Zofran PRN    Malnutrition:    - Level of  malnutrition: Severe   - Based on: moderate/severe subcutaneous fat loss, moderate/severe muscle loss     # Alcoholic hepatitis complicated by portal hypertension, ascites, and HE - improving   # Hx of Alcohol use disorder c/b withdrawal   # Anemia  # Coagulopathy   Patient with a history of marked alcohol use and alcoholic hepatitis in the past. Was given a course of steroids (1 week) during previous admission at OSH. Patient mental status has been stable, alert and oriented x4 after rifaxamin and aggressive lactulose. Not a liver transplant candidate.   - Lactulose 20g daily and PRN and rifaximin 550mg BID - as patient allows  - Daily folic acid and thiamine  - Delirium precautions  - Melatonin 6mg at bedtime  - Daily CBC   - Transfuse if hemoglobin < 7, platelet < 10 or < 30 with bleeding  - Continue to monitor for signs of bleeding      # Stage III RADHA on HD due to ATN  # ESRD due to ATN  # Hypervolemia causing acute respiratory failure, resolved  Nephrology note 7/21 indicates that patient has been on renal replacement therapy for two months and pt status has most likely transitioned from ARF to ESRD at this point.      > Hemodialysis as per renal reccs - TTS              - Midodrine 10 mg PRN before dialysis sessions     > Daily chemistry labs, strict I/O, and daily weights  - Aspiration precautions  - Supplemental oxygen as needed to keep sats above 89%    #Anemia  Baseline hgb 17. Running between 6-8 in setting of chronic illness, new ESRD, frequent lab draws and procedures.  - Goal hgb >7.0, trend Hgb    # Intermittent lactic acidosis  Suspect due to poor lactate clearance due to ESRD and ESLD + ongoing inflammation/infection. No fluids given pt is on dialysis; however, did receive Albumin last night.    # Apathy, concern for depression  Patient is intermittently refusing cares due to wanting a 'break,' but also reaffirms that he wants everything done to prolong his life. Mood is depressed, apathetic at  times. Patient has multiple stressors including his own medical conditions and family health issues.  - Continue to offer emotional support services such as psychiatry, palliative,   - He declined psychiatry/psychology visits     # Goals of Care  Please see additional interval note for in depth details regarding conversation on 7/9 about goals of care. Patient's long term prognosis is still quite poor given his multi organ failure. Care conference on 7/15, pt did not wish to participate. Met with sisters Noni (on phone) and Melyssa and close friend Leroy with palliative, nephrology and general medicine teams to discuss Dax's current improving status, poor prognosis and wishes to continue full cares.  - Hepatology confirmed pt will not be a transplant candidate  - Daily phone updates to sister Noni ([phone])       Diet: Adult Formula Drip Feeding: Continuous Vital 1.5; Jejunostomy; Goal Rate: 60; mL/hr; Medication - Feeding Tube Flush Frequency: At least 15-30 mL water before and after medication administration and with tube clogging; Amount to Send (Nutrition us...  Regular Diet Adult Thin Liquids (level 0)  Calorie Counts    DVT Prophylaxis: Pneumatic Compression Devices  Rdz Catheter: Not present  Fluids: 10 mL NS TKO  Central Lines: PRESENT  PICC Triple Lumen 06/28/21 Left-Site Assessment: WDL  CVC Double Lumen Right-Site Assessment: WDL  Code Status: Full Code      Disposition Plan   Expected discharge: >7 days, recommended to transitional care unit once  nutrition improved, infected pancreatic pseudocyst is well managed and plan for dialysis is in place.       Johny Ortiz MD  [phone]  [address]  Securely message with the Vocera Web Console (learn more here)  Text page via theRightAPI Paging/Directory  Please see sign in/sign out for up to date coverage  information  _____________________________________________________________________    Interval History    Hypotension this AM responsive to fluids. Tolerating regular diet, Improvement in diarrhea. Gas from stomach/peg tube noted overnight    Physical Exam   Vital Signs: Temp: 98.1  F (36.7  C) Temp src: Oral BP: (!) 85/66 Pulse: (!) 130   Resp: 16 SpO2: 97 % O2 Device: Nasal cannula Oxygen Delivery: 2 LPM  Weight: 148 lbs 12.97 oz  Constitutional: Awake, non-diaphoretic, conversational regarding upcoming surgery plans  HEENT: Normocephalic. Atraumatic. No scleral icterus.  Cardiovascular: tachycardic, tender chest to palpation diffusely  Respiratory: tachypnic on 1LPM of O2, improving breath sounds  Abdomen: Tolerates palpation with minimal pain, PEG-tube site clean  Musculoskeletal: Emaciated, moving all extremities on examination  Skin: resolving jaundice    Data   Recent Labs   Lab 08/13/21  0554 08/12/21  0528 08/11/21  0729 08/11/21  0555 08/08/21  2241 08/08/21  1435 08/08/21  0509 08/06/21  2215 08/06/21  1255 08/06/21  1002   WBC 20.2* 16.5*  --  17.0*  --   --  18.8*  --    < >  --    HGB 8.5* 7.5*  --  8.2*  --   --  8.0*  --    < >  --    MCV 91 88  --  87  --   --  89  --    < >  --     278  --  274  --   --  264  --    < >  --    INR  --   --   --   --   --   --  1.74*  --   --  1.68*   * 129*  --  128*   < >  --  129*  --    < >  --    POTASSIUM 4.1 4.0  --  3.7   < >  --  3.9  --    < >  --    CHLORIDE 97 96  --  97   < >  --  97  --    < >  --    CO2 26 21  --  22   < >  --  22  --    < >  --    BUN 17 28  --  12   < >  --  13  --    < >  --    CR 1.76* 2.78*  --  2.14*   < >  --  2.01*  --    < >  --    ANIONGAP 7 12  --  9   < >  --  10  --    < >  --    JANENE 7.8* 7.6*  --  8.0*   < >  --  8.0*  --    < >  --    * 165* 94 85   < >  --  128*  --    < >  --    ALBUMIN 1.6* 1.6*  --  1.6*   < >  --  1.6*   < >   < >  --    PROTTOTAL 6.9 6.7*  --  6.9   < >  --  7.0   < >   < >  --     BILITOTAL 2.0* 2.0*  --  2.2*   < >  --  2.8*   < >   < >  --    ALKPHOS 122 109  --  109   < >  --  126   < >   < >  --    ALT 32 27  --  16   < >  --  24   < >   < >  --    AST 83* 68*  --  26   < >  --  51*   < >   < >  --    TROPONIN  --   --   --   --   --  <0.015 <0.015  --   --   --     < > = values in this interval not displayed.     No results found for this or any previous visit (from the past 24 hour(s)).

## 2021-08-13 NOTE — PROVIDER NOTIFICATION
Provider paged regarding increasing drainage from Gtube site that was thick/creamy/green/yellow. Around insertion site was red and irritated looking. When nurse was cleaning site there was gas coming out around insertion site.     Also, when reviewing the chart the last documented dressing change was on 8/10. Patient has been refusing to have the dressing changed and site cleaned. Nurse reiterated the importance of cleaning the site and encouraged him to stop refusing.     Provider came to bedside to assess and is going to contact GI.

## 2021-08-13 NOTE — PLAN OF CARE
Neuro: A&Ox4. Able to make needs known.   Cardiac: ST 120s. MAPs maintained over 60.   Respiratory:  Maintaining adequate O2 sats via NC at 2 LPM/NC.   GI/: voiding poorly, on HD. No BMs this shift.  Diet/appetite:  On Renal Diet. Tolerating diet well. Tube feed continuous at 60 ml/H. .   Activity:  Assist of 2 in turning/repositioning in bed.   Pain: Scheduled and PRN Dilaudid utilized for back and abdominal pain with pain relief noted.  Skin: Peeling and cracked feet. Redness in bottom continues, area cleansed and patted dry.   LDA's: PICC x 3 Lumens all SL. CVC for HD. G Tube with Milky Drainage. See previous note.    Plan: HD done today x 3 hours with 2L removed. Nursing will continue to follow the POC and update the MD team with concerns.

## 2021-08-13 NOTE — PROGRESS NOTES
Nephrology Progress Note  08/13/2021         Dax Villareal is a 30 year old male with h/o ETOH hepatitis, end stage liver disease, RADHA on HD, transferred from [facility] in [place] for septic shock on 6/28/21 for treatment of necrotizing pancreatitis and decompensated liver disease. He was initially admitted to [facility] 5/19/21.        Interval History :   Mr Villareal had necrosectomy for 4th time on 8/11, had HD yesterday and pulled 2L.  Given IVF overnight as he was hypotensive but WBC is up as well, has large right pleural effusion on chest CT and IVC dilation on ECHO.  Plan to run HD tomorrow, is making a bit of UOP, is nearly 3 months into HD but is improving from GI/global standpoint so will watch for recovery.         Assessment & Recommendations:   RADHA-Cr was 0.8 as recently as 5/19/2021 but now HD dependent, started RRT at [facility] prior to transfer to [facility] (exact date unclear but per family it was end of May).  Etiology likely multifactorial with contrast, sepsis/pancreatitis, likely HRS physiology contributing as well.  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  Continuing RRT as long as it is tolerated with regards to BP's, is functional enough to possibly do outpt HD.  MELD is in high 30's so has high 3 month mortality but continuing restorative cares for now.  Had GI stenting of pancreas x4.                 -Line is TDC from PTA               - HD on TTS schedule unless issues arise.      Volume status-Making small amounts of UOP, has large pleural effusion by CT today and IVC is dilated on ECHO although this is likely worsened due to effusion.                -likely needs another thoracentesis     Electrolytes/pH-K 4.1, bicarb 26, no acute issues.       Ca/phos/pth-Ca 7.8, corrects to normal with low albumin.  Mg and Phos reasonable.       Anemia-Hgb low, acute management per team, last PRBC's 7/14.  Started on EPO 5k with runs, iron sats 43%      Nutrition-Taking PO, appetite reasonable.   "     Seen and discussed with Dr Dow      Recommendations were communicated to primary team via note          SOFIA Marlow CNS  Clinical Nurse Specialist  [phone]    Review of Systems:   I reviewed the following systems:  Gen: No fevers or chills  CV: No CP at rest  Resp: No SOB at rest  GI: No N/V    Physical Exam:   I/O last 3 completed shifts:  In: 1666 [I.V.:206; Other:200; NG/GT:60]  Out: 1875 [Urine:50; Emesis/NG output:125; Other:1700]   /82 (BP Location: Right arm)   Pulse (!) 129   Temp 98  F (36.7  C) (Oral)   Resp 20   Ht 1.676 m (5' 5.98\")   Wt 67.5 kg (148 lb 13 oz)   SpO2 99%   BMI 24.03 kg/m       GENERAL APPEARANCE: Lying in bed, RR shallow and he is coughing frequently  EYES:  scleral icterus, pupils equal  Pulmonary: decreased BS   CV: tachy   - Edema - no LE edema  GI: mild distention, non-tender. Has pancreatic drain  MS: no evidence of inflammation in joints, no muscle tenderness  SKIN: no rash, warm, dry, no cyanosis  NEURO: alert/interactive  ACCESS: Right TDC    Labs:   All labs reviewed by me  Electrolytes/Renal - Recent Labs   Lab Test 08/13/21  0554 08/12/21  0528 08/11/21  0729 08/11/21  0555   * 129*  --  128*   POTASSIUM 4.1 4.0  --  3.7   CHLORIDE 97 96  --  97   CO2 26 21  --  22   BUN 17 28  --  12   CR 1.76* 2.78*  --  2.14*   * 165* 94 85   JANENE 7.8* 7.6*  --  8.0*   MAG 1.6 2.0  --  1.7   PHOS 2.4* 4.1  --  3.2       CBC -   Recent Labs   Lab Test 08/13/21  0554 08/12/21  0528 08/11/21  0555   WBC 20.2* 16.5* 17.0*   HGB 8.5* 7.5* 8.2*    278 274       LFTs -   Recent Labs   Lab Test 08/13/21  0554 08/12/21  0528 08/11/21  0555   ALKPHOS 122 109 109   BILITOTAL 2.0* 2.0* 2.2*   ALT 32 27 16   AST 83* 68* 26   PROTTOTAL 6.9 6.7* 6.9   ALBUMIN 1.6* 1.6* 1.6*       Iron Panel -   Recent Labs   Lab Test 07/19/21  0632   IRON 31*   IRONSAT 43           Current Medications:   sodium bicarbonate  325 mg Per Feeding Tube Q4H    And    " amylase-lipase-protease  1-2 capsule Per Feeding Tube Q4H    amylase-lipase-protease  2 capsule Oral TID w/meals    B and C vitamin Complex with folic acid  5 mL Per Feeding Tube Daily    [Held by provider] banatrol plus  1 packet Per Feeding Tube TID    ceFEPIme (MAXIPIME) IV  1 g Intravenous Q24H    folic acid  1 mg Oral Daily    [Held by provider] heparin ANTICOAGULANT  5,000 Units Subcutaneous Q12H    HYDROmorphone (STANDARD CONC)  2 mg Oral Q8H    melatonin  6 mg Oral At Bedtime    metroNIDAZOLE  500 mg Intravenous Q12H    sodium chloride (PF)  100 mL Irrigation Q4H LENKA    sodium chloride (PF)  3 mL Intracatheter Q8H    sodium chloride (PF)  3 mL Intracatheter Q8H    sodium chloride (PF)  73 mL Intravenous Once    vitamin B1  100 mg Oral Daily    Or    thiamine  100 mg Intravenous Daily      - MEDICATION INSTRUCTIONS -      dextrose       I have seen and examined this patient with the NPt.  This note reflects our joint assessment and plan.     Yu Dow MD

## 2021-08-13 NOTE — PROGRESS NOTES
GASTROENTEROLOGY PROGRESS NOTE      Date of Admission: 6/28/2021  Reason for Admission: pancreatitis/hepatitis    ASSESSMENT/RECOMMENDATIONS:  30 year old male with a history of heavy alcohol use who presented to OSH 5/19 for abdominal pain, nausea and vomiting, admitted for alcohol hepatitis, ESLD, RADHA on HD, septic shock requiring pressors as well as necrotizing pancreatitis with large evolving necrotic collection. Transferred to [facility] for consideration of drainage of presumed infected necrotic collection. S/p EUS-guided necrosectomy (7/21/21).     #New liver abscess   Seen on CT, agree w/ IR consult for drainage vs sampling, continue abx, would obtain blood cx and check ascites and pleural fluid for infection as well    #. Acute necrotizing pancreatitis with presumed infected walled off necrotic collection s/p percutaneous and endoscopic drainage   #. Septic shock, resolved  #. Leukocytosis  #. Ascites/Peritonitis  Unclear evolution of pancreatitis history but had a recent CT scan with large evolving necrotic collection, presumed infected. No obvious gas in collection. CT on admission to [facility] showing mature collection with enhancing wall measuring ~[id number]. With profound coagulopathy (liver disease + malnutrition +sepsis), INR 3.8 on transfer he was given FFP and Vit K, IR placed perc drain 6/29. Repeat imaging 7/18 with still large walled off necrotic collection that appeared amenable to endoscopic transluminal drainage, underwent cystgastrostomy stenting on 7/21/21, near complete debridement/necrosectomy 8/6 with plans for final debridement later this week.  Etiology: alcohol  Date of onset: 5/19  BISAP score on admission: unclear  Concurrent organ failure: respiratory (now extubated), renal (on HD), liver, cardiogenic (weaned off pressors 6/29)  Thromboses: none  Diabetes: no  Current nutrition access: PEG-J + po diet  Last imaging: CT scan with IV contrast 8/10  Infection/antiinfectives:                  - Meropenem (6/29 - 7/18), Ciprofloxacin (7/19 - 7/21), Vancomycin (6/29 - 6/30), Micafungin (6/29 - 7/1), Ceftriaxone (7/21 - 7/23), Linezolid (7/21 - 7/30), Zosyn (7/27-8/6), Cefepime (8/6 - present), Metronidazole (8/6 - present)    Intervention:    6/30 - Left IR RP perc drain (24F)    7/2 - Diagnostic paracentesis (WBC 2663 - 74% PMN, SAAG <1.1, protein 4.1)     7/5 - Repeat Paracentesis (WBC 1801 - 61% PMN, lipase 48)    7/13 - Diag/therapeutic paracentesis ()    7/13 - PEG-J placement with T-tag gastropexy    7/21 - Repeat paracentesis    7/21 - EUS-cystgastrostomy and necrosectomy, Axios with co-axial Viabil    7/28 - EGD, stent replacement (transluminal Axios with coaxial Solus), PEG-J removal (buried bumper) and replacement of new PEG-J with one new T-tag    8/6 - EGD with necrosectomy and near complete necrosectomy    8/11 - EGD with completion necrosectomy, no e/o bowel fistula    - No further necrosectomies anticipated  - Consult IR re: capping perc drain  - PRN paracentesis  - Continue antibiotics until drain removed  - pain meds per primary team    #. Hematochezia, resolved  Two episodes of BRBPR 8/8-8/9. Likely related to oozing after necrosectomy in the setting of new anticoagulation for questionable PE (CT Chest 8/8 with concern for PE). Hgb stable. Bleeding stopped after anticoagulation was stopped. Would continue to hold anticoagulation for now. Hemodynamics unchanged. CTA 8/10 without PE  - Follow H/H  - Hold AC for now    #. Severe malnutrition in the context of chronic illness  #. Gastric outlet obstruction s/p PEG-J placement  #. Mild to moderate EPI  #. Concern for bowel fistula  Patient with very poor oral intake over the course of his illness and was not meeting calorie requirements orally. S/p PEG-J with T-tag gastropexy (x3) 7/13. Overall improved. Had one day of very large output from perc drain that appeared to look like tube feeds -- no e/o bowel fistula on fluroscopy.   - G  "tube to gravity   - Diet as tolerated  - Continue tube feeds with PERT    #. Alcohol hepatitis  #. Hyperbilirubinemia  #. Coagulopathy  #. Encephalopathy, improved  Known heavy alcohol use. Reportedly received course of steroids at OSH for 1 week with \"some improvement\". Currently has primarily hyperbilirubinemia and mildly elevated AST. Unclear if he has underlying cirrhosis but certainly would fit with alcohol hepatitis vs elevated liver tests of critical illness/sepsis. Not candidate for steroids right now. He is receiving lactulose and Xifaxin for encephalopathy. Hepatology notified of admission and they have weighed in on liver transplant status 7/22 (Makayla Glass APRN CNP)  - trend MELD labs  - alcohol abstinence    GI AE will continue to follow, call with questions    The patient was seen and discussed and plan agreed upon with GI staff, Dr. Ramos Antony M.D.   GI Fellow  [phone]  ____________________________________________      Subjective: Nursing notes and 24hr events reviewed.  Denies abdominal pain. Tolerating general diet without vomiting.    ROS:   4 pt ROS negative unless noted in subjective.     Objective:  Blood pressure 135/82, pulse (!) 129, temperature 98  F (36.7  C), temperature source Oral, resp. rate 20, height 1.676 m (5' 5.98\"), weight 67.5 kg (148 lb 13 oz), SpO2 99 %.  Gen: Laying in bed. Appears comfortable  HEENT: NCAT. Conjunctiva clear. Sclera + mild icteric, poor dentition  Resp: non labored breathing  Abd: mild distention, soft, NT, GJ tube with tube feeds through J and minimal bilious output in G tube  Skin: + mild jaundice  Ext: warm, well perfused  Mental status/Psych: alert and oriented    LABS:  BMP  Recent Labs   Lab 08/13/21  0554 08/12/21  0528 08/11/21  0729 08/11/21  0555 08/10/21  0631   * 129*  --  128* 123*   POTASSIUM 4.1 4.0  --  3.7 3.6   CHLORIDE 97 96  --  97 91*   JANENE 7.8* 7.6*  --  8.0* 7.6*   CO2 26 21  --  22 19*   BUN 17 28  --  12 30 "   CR 1.76* 2.78*  --  2.14* 3.81*   * 165* 94 85 142*     CBC  Recent Labs   Lab 08/13/21  0554 08/12/21  0528 08/11/21  0555 08/10/21  0631   WBC 20.2* 16.5* 17.0* 16.8*   RBC 2.85* 2.54* 2.74* 2.65*   HGB 8.5* 7.5* 8.2* 7.9*   HCT 25.9* 22.3* 23.9* 23.0*   MCV 91 88 87 87   MCH 29.8 29.5 29.9 29.8   MCHC 32.8 33.6 34.3 34.3   RDW 16.5* 16.4* 16.4* 15.9*    278 274 298     INR  Recent Labs   Lab 08/08/21  0509   INR 1.74*     LFTs  Recent Labs   Lab 08/13/21  0554 08/12/21  0528 08/11/21  0555 08/10/21  0631   ALKPHOS 122 109 109 110   AST 83* 68* 26 31   ALT 32 27 16 18   BILITOTAL 2.0* 2.0* 2.2* 2.2*   PROTTOTAL 6.9 6.7* 6.9 6.7*   ALBUMIN 1.6* 1.6* 1.6* 1.4*      PANC  No lab results found in last 7 days.      IMAGING:  Reviewed most recent imaging

## 2021-08-13 NOTE — PROGRESS NOTES
"/82 (BP Location: Right arm)   Pulse (!) 129   Temp 98  F (36.7  C) (Oral)   Resp 20   Ht 1.676 m (5' 5.98\")   Wt 67.5 kg (148 lb 13 oz)   SpO2 99%   BMI 24.03 kg/m       Neuro: A&Ox4. Flat affect.  Cardiac: Afebrile, VSS.            Respiratory: 1-2L NC per pt request.  GI/:on HD .BM this shift.   Diet/appetite: Tolerating diet. Poor appetite. TFs 60, Denies nausea   Activity: Up Turn and repo.    Pain: Agreeable to current regimen.   Skin: No new deficits noted.  LDA: L drain (irrigated q4h), G/J tube - G to gravity drainage and J w/continuous tube feeds, LUE PICC, R chest HD access.  Abd CT done today. ECHO completed. No new complaints.Pt Will continue to monitor and follow plan of care.   "

## 2021-08-14 ENCOUNTER — APPOINTMENT (OUTPATIENT)
Dept: GENERAL RADIOLOGY | Facility: CLINIC | Age: 31
End: 2021-08-14
Attending: INTERNAL MEDICINE
Payer: MEDICAID

## 2021-08-14 LAB
ALBUMIN SERPL-MCNC: 1.5 G/DL (ref 3.4–5)
ALP SERPL-CCNC: 110 U/L (ref 40–150)
ALT SERPL W P-5'-P-CCNC: 27 U/L (ref 0–70)
ANION GAP SERPL CALCULATED.3IONS-SCNC: 7 MMOL/L (ref 3–14)
AST SERPL W P-5'-P-CCNC: 54 U/L (ref 0–45)
BACTERIA PLR CULT: NO GROWTH
BILIRUB SERPL-MCNC: 1.9 MG/DL (ref 0.2–1.3)
BUN SERPL-MCNC: 29 MG/DL (ref 7–30)
CALCIUM SERPL-MCNC: 7.8 MG/DL (ref 8.5–10.1)
CHLORIDE BLD-SCNC: 96 MMOL/L (ref 94–109)
CO2 SERPL-SCNC: 25 MMOL/L (ref 20–32)
CREAT SERPL-MCNC: 2.49 MG/DL (ref 0.66–1.25)
ERYTHROCYTE [DISTWIDTH] IN BLOOD BY AUTOMATED COUNT: 16.8 % (ref 10–15)
FERRITIN SERPL-MCNC: 2186 NG/ML (ref 26–388)
GFR SERPL CREATININE-BSD FRML MDRD: 33 ML/MIN/1.73M2
GLUCOSE BLD-MCNC: 138 MG/DL (ref 70–99)
GLUCOSE BLDC GLUCOMTR-MCNC: 152 MG/DL (ref 70–99)
HCT VFR BLD AUTO: 24.8 % (ref 40–53)
HGB BLD-MCNC: 7.9 G/DL (ref 13.3–17.7)
IRON SATN MFR SERPL: 21 % (ref 15–46)
IRON SERPL-MCNC: 28 UG/DL (ref 35–180)
LACTATE SERPL-SCNC: 1.9 MMOL/L (ref 0.7–2)
LDH SERPL L TO P-CCNC: 169 U/L (ref 85–227)
MAGNESIUM SERPL-MCNC: 1.8 MG/DL (ref 1.6–2.3)
MCH RBC QN AUTO: 28.8 PG (ref 26.5–33)
MCHC RBC AUTO-ENTMCNC: 31.9 G/DL (ref 31.5–36.5)
MCV RBC AUTO: 91 FL (ref 78–100)
PHOSPHATE SERPL-MCNC: 4.2 MG/DL (ref 2.5–4.5)
PLATELET # BLD AUTO: 277 10E3/UL (ref 150–450)
POTASSIUM BLD-SCNC: 4 MMOL/L (ref 3.4–5.3)
PROT SERPL-MCNC: 6.9 G/DL (ref 6.8–8.8)
PROT SERPL-MCNC: 7 G/DL (ref 6.8–8.8)
RBC # BLD AUTO: 2.74 10E6/UL (ref 4.4–5.9)
SARS-COV-2 RNA RESP QL NAA+PROBE: NEGATIVE
SODIUM SERPL-SCNC: 128 MMOL/L (ref 133–144)
TIBC SERPL-MCNC: 132 UG/DL (ref 240–430)
WBC # BLD AUTO: 14.8 10E3/UL (ref 4–11)

## 2021-08-14 PROCEDURE — 99233 SBSQ HOSP IP/OBS HIGH 50: CPT

## 2021-08-14 PROCEDURE — 250N000013 HC RX MED GY IP 250 OP 250 PS 637: Performed by: INTERNAL MEDICINE

## 2021-08-14 PROCEDURE — 250N000011 HC RX IP 250 OP 636: Performed by: INTERNAL MEDICINE

## 2021-08-14 PROCEDURE — 85027 COMPLETE CBC AUTOMATED: CPT | Performed by: INTERNAL MEDICINE

## 2021-08-14 PROCEDURE — 120N000002 HC R&B MED SURG/OB UMMC

## 2021-08-14 PROCEDURE — 258N000003 HC RX IP 258 OP 636: Performed by: CLINICAL NURSE SPECIALIST

## 2021-08-14 PROCEDURE — 71045 X-RAY EXAM CHEST 1 VIEW: CPT

## 2021-08-14 PROCEDURE — 84155 ASSAY OF PROTEIN SERUM: CPT | Performed by: STUDENT IN AN ORGANIZED HEALTH CARE EDUCATION/TRAINING PROGRAM

## 2021-08-14 PROCEDURE — 71045 X-RAY EXAM CHEST 1 VIEW: CPT | Mod: 26 | Performed by: STUDENT IN AN ORGANIZED HEALTH CARE EDUCATION/TRAINING PROGRAM

## 2021-08-14 PROCEDURE — 83605 ASSAY OF LACTIC ACID: CPT | Performed by: STUDENT IN AN ORGANIZED HEALTH CARE EDUCATION/TRAINING PROGRAM

## 2021-08-14 PROCEDURE — 99233 SBSQ HOSP IP/OBS HIGH 50: CPT | Mod: GC | Performed by: STUDENT IN AN ORGANIZED HEALTH CARE EDUCATION/TRAINING PROGRAM

## 2021-08-14 PROCEDURE — U0003 INFECTIOUS AGENT DETECTION BY NUCLEIC ACID (DNA OR RNA); SEVERE ACUTE RESPIRATORY SYNDROME CORONAVIRUS 2 (SARS-COV-2) (CORONAVIRUS DISEASE [COVID-19]), AMPLIFIED PROBE TECHNIQUE, MAKING USE OF HIGH THROUGHPUT TECHNOLOGIES AS DESCRIBED BY CMS-2020-01-R: HCPCS | Performed by: INTERNAL MEDICINE

## 2021-08-14 PROCEDURE — 80053 COMPREHEN METABOLIC PANEL: CPT | Performed by: INTERNAL MEDICINE

## 2021-08-14 PROCEDURE — 83735 ASSAY OF MAGNESIUM: CPT | Performed by: INTERNAL MEDICINE

## 2021-08-14 PROCEDURE — 36592 COLLECT BLOOD FROM PICC: CPT

## 2021-08-14 PROCEDURE — 634N000001 HC RX 634: Performed by: CLINICAL NURSE SPECIALIST

## 2021-08-14 PROCEDURE — 84100 ASSAY OF PHOSPHORUS: CPT | Performed by: INTERNAL MEDICINE

## 2021-08-14 PROCEDURE — 83550 IRON BINDING TEST: CPT

## 2021-08-14 PROCEDURE — 250N000013 HC RX MED GY IP 250 OP 250 PS 637: Performed by: STUDENT IN AN ORGANIZED HEALTH CARE EDUCATION/TRAINING PROGRAM

## 2021-08-14 PROCEDURE — 250N000011 HC RX IP 250 OP 636: Performed by: STUDENT IN AN ORGANIZED HEALTH CARE EDUCATION/TRAINING PROGRAM

## 2021-08-14 PROCEDURE — 36592 COLLECT BLOOD FROM PICC: CPT | Performed by: STUDENT IN AN ORGANIZED HEALTH CARE EDUCATION/TRAINING PROGRAM

## 2021-08-14 PROCEDURE — 36592 COLLECT BLOOD FROM PICC: CPT | Performed by: INTERNAL MEDICINE

## 2021-08-14 PROCEDURE — 250N000013 HC RX MED GY IP 250 OP 250 PS 637

## 2021-08-14 PROCEDURE — 82728 ASSAY OF FERRITIN: CPT

## 2021-08-14 PROCEDURE — 83615 LACTATE (LD) (LDH) ENZYME: CPT | Performed by: STUDENT IN AN ORGANIZED HEALTH CARE EDUCATION/TRAINING PROGRAM

## 2021-08-14 RX ORDER — VIT E ACET/GLY/DIMETH/WATER
LOTION (ML) TOPICAL 2 TIMES DAILY PRN
Status: DISCONTINUED | OUTPATIENT
Start: 2021-08-14 | End: 2021-10-12 | Stop reason: HOSPADM

## 2021-08-14 RX ADMIN — HYDROXYZINE HYDROCHLORIDE 50 MG: 25 TABLET, FILM COATED ORAL at 10:45

## 2021-08-14 RX ADMIN — SODIUM CHLORIDE 250 ML: 9 INJECTION, SOLUTION INTRAVENOUS at 10:20

## 2021-08-14 RX ADMIN — METRONIDAZOLE 500 MG: 500 INJECTION, SOLUTION INTRAVENOUS at 20:55

## 2021-08-14 RX ADMIN — METRONIDAZOLE 500 MG: 500 INJECTION, SOLUTION INTRAVENOUS at 00:33

## 2021-08-14 RX ADMIN — GUAIFENESIN 10 ML: 200 SOLUTION ORAL at 16:34

## 2021-08-14 RX ADMIN — HYDROMORPHONE HYDROCHLORIDE 1 MG: 1 SOLUTION ORAL at 21:09

## 2021-08-14 RX ADMIN — EPOETIN ALFA-EPBX 5000 UNITS: 10000 INJECTION, SOLUTION INTRAVENOUS; SUBCUTANEOUS at 10:46

## 2021-08-14 RX ADMIN — Medication 1 PACKET: at 21:51

## 2021-08-14 RX ADMIN — PANCRELIPASE 2 CAPSULE: 24000; 76000; 120000 CAPSULE, DELAYED RELEASE PELLETS ORAL at 04:00

## 2021-08-14 RX ADMIN — SODIUM BICARBONATE 325 MG: 325 TABLET ORAL at 08:26

## 2021-08-14 RX ADMIN — SODIUM BICARBONATE 325 MG: 325 TABLET ORAL at 18:10

## 2021-08-14 RX ADMIN — PANCRELIPASE 2 CAPSULE: 24000; 76000; 120000 CAPSULE, DELAYED RELEASE PELLETS ORAL at 08:10

## 2021-08-14 RX ADMIN — ONDANSETRON 4 MG: 2 INJECTION INTRAMUSCULAR; INTRAVENOUS at 08:04

## 2021-08-14 RX ADMIN — HYDROMORPHONE HYDROCHLORIDE 2 MG: 1 SOLUTION ORAL at 15:09

## 2021-08-14 RX ADMIN — Medication 1 PACKET: at 14:49

## 2021-08-14 RX ADMIN — SODIUM BICARBONATE 325 MG: 325 TABLET ORAL at 14:10

## 2021-08-14 RX ADMIN — HYDROMORPHONE HYDROCHLORIDE 2 MG: 1 SOLUTION ORAL at 01:05

## 2021-08-14 RX ADMIN — PANCRELIPASE 2 CAPSULE: 24000; 76000; 120000 CAPSULE, DELAYED RELEASE PELLETS ORAL at 18:10

## 2021-08-14 RX ADMIN — ONDANSETRON 4 MG: 2 INJECTION INTRAMUSCULAR; INTRAVENOUS at 16:34

## 2021-08-14 RX ADMIN — Medication 5 ML: at 08:13

## 2021-08-14 RX ADMIN — SODIUM CHLORIDE 300 ML: 9 INJECTION, SOLUTION INTRAVENOUS at 10:20

## 2021-08-14 RX ADMIN — CEFEPIME HYDROCHLORIDE 1 G: 1 INJECTION, POWDER, FOR SOLUTION INTRAMUSCULAR; INTRAVENOUS at 22:10

## 2021-08-14 RX ADMIN — PANCRELIPASE 2 CAPSULE: 24000; 76000; 120000 CAPSULE, DELAYED RELEASE PELLETS ORAL at 14:10

## 2021-08-14 RX ADMIN — HYDROMORPHONE HYDROCHLORIDE 2 MG: 1 SOLUTION ORAL at 08:11

## 2021-08-14 RX ADMIN — ONDANSETRON 4 MG: 2 INJECTION INTRAMUSCULAR; INTRAVENOUS at 02:37

## 2021-08-14 RX ADMIN — PANCRELIPASE 2 CAPSULE: 24000; 76000; 120000 CAPSULE, DELAYED RELEASE PELLETS ORAL at 18:11

## 2021-08-14 RX ADMIN — ACETAMINOPHEN 325 MG: 325 SOLUTION ORAL at 08:12

## 2021-08-14 RX ADMIN — METRONIDAZOLE 500 MG: 500 INJECTION, SOLUTION INTRAVENOUS at 14:34

## 2021-08-14 RX ADMIN — HYDROMORPHONE HYDROCHLORIDE 1 MG: 1 SOLUTION ORAL at 04:31

## 2021-08-14 RX ADMIN — SODIUM BICARBONATE 325 MG: 325 TABLET ORAL at 00:00

## 2021-08-14 RX ADMIN — SODIUM BICARBONATE 325 MG: 325 TABLET ORAL at 22:13

## 2021-08-14 RX ADMIN — THIAMINE HYDROCHLORIDE 100 MG: 100 INJECTION, SOLUTION INTRAMUSCULAR; INTRAVENOUS at 08:27

## 2021-08-14 RX ADMIN — PANCRELIPASE 2 CAPSULE: 24000; 76000; 120000 CAPSULE, DELAYED RELEASE PELLETS ORAL at 22:13

## 2021-08-14 RX ADMIN — MELATONIN TAB 3 MG 6 MG: 3 TAB at 22:12

## 2021-08-14 RX ADMIN — SODIUM BICARBONATE 325 MG: 325 TABLET ORAL at 04:00

## 2021-08-14 RX ADMIN — PANCRELIPASE 2 CAPSULE: 24000; 76000; 120000 CAPSULE, DELAYED RELEASE PELLETS ORAL at 00:48

## 2021-08-14 RX ADMIN — GUAIFENESIN 10 ML: 200 SOLUTION ORAL at 01:06

## 2021-08-14 RX ADMIN — FOLIC ACID 1 MG: 1 TABLET ORAL at 08:13

## 2021-08-14 RX ADMIN — HYDROXYZINE HYDROCHLORIDE 50 MG: 25 TABLET, FILM COATED ORAL at 01:05

## 2021-08-14 ASSESSMENT — ACTIVITIES OF DAILY LIVING (ADL)
ADLS_ACUITY_SCORE: 17
ADLS_ACUITY_SCORE: 17
ADLS_ACUITY_SCORE: 16
ADLS_ACUITY_SCORE: 17
ADLS_ACUITY_SCORE: 17

## 2021-08-14 ASSESSMENT — MIFFLIN-ST. JEOR: SCORE: 1677.75

## 2021-08-14 NOTE — PROVIDER NOTIFICATION
Writer updated provider that pt has high heart rate since coming back from dialysis in the 130's has had emesis x1.

## 2021-08-14 NOTE — PLAN OF CARE
Neuro: A&Ox4. Pt calls as needed  Cardiac: -120. VSS.   Respiratory: Sating 94%>on 3-4 LPM via NC.  GI/: Pt is on HD though rt CVC line.   BM X1  Pt has rt tube draining pancreas fluid good output.  PEG for TF.  G tube is drainging to gravity.  Diet/appetite: Tolerating regular diet.  TF 60 ml/hr and 30 ml q 4 hours.  Eating well.  Activity:  Assist of 2, up to reposition.  Pain: At acceptable level on current regimen.   Skin: No new deficits noted.  LDA's:PICC, rt CVC line, peg tube,     Plan: Continue with POC. Notify primary team with changes.

## 2021-08-14 NOTE — PLAN OF CARE
"    SHIFT: 2225-0700    SITUATION: 30 year old male with history of ETOH admitted 6-28-21 after lengthy hospitalization at [facility] in [place] who has severe alcoholic hepatitis  c/b HE, malnutrition in the setting of acute necrotizing pancreatitis,  ESRD-  Hemodialysis dependent, hepatic encephalopathy, infected pseudocyst,  Right pleural effusion, and multiple necrosectomies.    VITALS: /66 (BP Location: Right arm)   Pulse (!) 129   Temp 97.8  F (36.6  C)   Resp 18   Ht 1.676 m (5' 6\")   Wt 74.5 kg (164 lb 3.9 oz)   SpO2 94%   BMI 26.51 kg/m       PAIN: Current pain regimen effective for  abdominal pain control with scheduled liquid dilaudid q8 prn liquid dilaudid q 4, robitussin q4,  PO atarax q6.     CARDIAC:  TELE sinus tachycardic- Pulse 100-135    NEURO: Alert/ox3;, intermittently refuses positioning    RESPIRATORY: LS diminished- dry, productive cough;Requires 3-4L oxygen per NC to maintain > 94%. Requested robitussin and tesselon pearls    GI/: + BS, abdomen  Distended, PEG-J placement on 7-13.PEG for TF at 60cc/hr with 30cc flushes q4.  J tube TFs and G tube to gravity.  Hemodialysis patient, 1 BM this shift using bedpan. Anuric with minimal UO via urinal.     DIET: Poor tolerance of regular diet with episode of large emesis. Given PRN Compazine and Zofran;  malnourised    ACTIVITY: Assist of 2 with bed mobility and repositioning    LINES/DRAINS:  LUE triple lumen PICC patent, RUE PIV patent, s/l;PEG tube, G t tube draining to gravity, R CVC for hemodialysis,  Left drainage tube for pancreatic fluid flushed q 4 with NS per orders    SKIN:  Jaundice,  PEG tube has MASD  see WOC orders; Tyrel 16; Sacrum with blanchable redness    NOTABLE LABS: Patient flagged sepsis and lactic 1.9 at 0220     EVENTS of Shift:  Patient transferred from  at 2225.  2 person skin check completed by this author and Marcia (ROB) Patient received scheduled Flagyl, and medications.  TFs infusing with   Creon and " Bicarb q4.  L percutaneous drain flushed q .4.  TELE monitored.  Patient  has multiple requests and encouraged to write list to consolidate and cluster cares    PLAN:  Hourly rounding complete, call light within reach. Continue to monitor and follow POC. Notify primary team of changes. Anticipate dialysis this morning.

## 2021-08-14 NOTE — CONSULTS
"Interventional Radiology Consultation note      Mr. Villareal is a 30 year old male with a complex medical history including alcohol pancreatitis with necrotic collections s/p EUS guided necrosectomy on 7/21/21 presenting with elevated WBC and concern for intrahepatic abscess. CT from 8/13/21 demonstrated a segmental area of hypoattenuation in segment 2 measuring about 4 cm that could represent a liver abscess vs infarct. WBC improved since 8/13, down from 20k, now 14k. Patient remains tachycardic but this has persisted for several days, afebrile.    /64 (BP Location: Right arm)   Pulse (!) 135   Temp (!) 96.6  F (35.9  C) (Oral)   Resp 18   Ht 1.676 m (5' 6\")   Wt 78 kg (171 lb 15.3 oz)   SpO2 96%   BMI 27.75 kg/m      I reviewed the imaging, chart, and labs.     Recommendations:    - In general, hepatic abscesses less than 5 cm are treated with antibiotic therapy without drainage. This is under 4cm in size and does not appear to be causing overwhelming infection. The leukocytosis is trending downward and he is afebrile.   - Recommend continued antibiotic therapy.  - Aspiration could be an option in the future if this persists or clinical picture becomes conflicting. This is not amenable to placing a drain.  - We will otherwise be available for reconsultation.       Thank you for this consult.    Rush Iglesias DO on 8/14/2021 at 5:04 PM  Interventional Radiology service  "

## 2021-08-14 NOTE — PROGRESS NOTES
Nephrology Progress Note  08/14/2021     Dax Villareal is a 30 year old male with h/o ETOH hepatitis, end stage liver disease, RADHA on HD, transferred from [facility] in [place] for septic shock on 6/28/21 for treatment of necrotizing pancreatitis and decompensated liver disease. He was initially admitted to [facility] 5/19/21.       Assessment & Recommendations:     1 RADHA - Remains dialysis dependent/anuric.   Last HD prior to hospital admission was 6/26/21. He has not had any OP HD prior to this admission. Had been receiving inpatient HD at [facility] prior to transfer to [facility] on 6/28/21   - Etiology of RADHA felt to be JORGE L, abx, pancreatitis, sepsis, HRS   - Has Right TDC   - Will continue T Th Sat schedule   - CRRT/HD consent obtained from sister Noni per phone     2. Volume status - Improved w/o edema. On supplemental oxygen today. Pre run weight 78 kg. Admission weight 80.2 kg. -130/ Intake 2120 ml/Output: UO 50 ml, Drain 1500 ml.    - Tolerated 1.5 kg UF today   - Strict I/O please     3. CV - Shock 2/2 Sepsis: Echo on 8/14/21 showed EF of 55 % w/dilated IVC. -130/     4. ETOH hepatitis/ recurrent ascites, HE - Patient with heavy ETOH use. Last ETOH unknown. TB improved from 20's to 1.9 today. Off Lactulose/Rifax. Remains on  thiamine, Folate.   - Per GI     5. Electrolytes - No acute concerns. K 4.0, Na 128     - Patient had multiple fluids on his bedside table    - He should be on a 1.5 liter/day fluid restriction given that he is anuric on HD     6.  Acid base - No acute concerns. Bicarb 25     7. BMD - Ca 7.8, Phos 4.2, albumin 1.5    8. Antimicrobials - Metronidazole and Maxipime for intra abdominal infection. WBC 14.8, Afebrile      9. Anemia - Hgb 7.9   - Check iron studies   - Continue Epo 5000 unit(s) IV on HD. He is likely quite Epo resistant given infection/inflammatory state     Recommendations were communicated to primary team via progress note    Seen and discussed with   "Leigha Toledo, NP   [phone]    Interval History :   Nursing and provider notes from last 24 hours reviewed.  Seen on HD  Tolerating w/o complication    Review of Systems:   I reviewed the following systems:  No complaint  Denies dyspnea, CP, edema  ON TF and trying to eat more  Anuric    Physical Exam:   I/O last 3 completed shifts:  In: 1960 [P.O.:880; Other:600]  Out: 2170 [Urine:20; Drains:2150]   /64 (BP Location: Right arm)   Pulse (!) 135   Temp (!) 96.6  F (35.9  C) (Oral)   Resp 18   Ht 1.676 m (5' 6\")   Wt 78 kg (171 lb 15.3 oz)   SpO2 96%   BMI 27.75 kg/m       GENERAL APPEARANCE: comfortable on HD  EYES:  scleral icterus, pupils equal  Pulmonary: lungs clear to auscultation. On supplemental oxygen   CV: tachy   - Edema - no LE edema  GI: Non tender. PEG present. Has retroperitoneal drain  MS: no evidence of inflammation in joints, no muscle tenderness  SKIN: no rash, warm, dry, no cyanosis  NEURO: alert/interactive  ACCESS: Right TDC     Labs:   All labs reviewed by me  Electrolytes/Renal -   Recent Labs   Lab Test 08/14/21  1014 08/14/21  0228 08/13/21  0554 08/12/21  0528   *  --  130* 129*   POTASSIUM 4.0  --  4.1 4.0   CHLORIDE 96  --  97 96   CO2 25  --  26 21   BUN 29  --  17 28   CR 2.49*  --  1.76* 2.78*   * 152* 111* 165*   JANENE 7.8*  --  7.8* 7.6*   MAG 1.8  --  1.6 2.0   PHOS 4.2  --  2.4* 4.1       CBC -   Recent Labs   Lab Test 08/14/21  1104 08/13/21  0554 08/12/21  0528   WBC 14.8* 20.2* 16.5*   HGB 7.9* 8.5* 7.5*    298 278       LFTs -   Recent Labs   Lab Test 08/14/21  1014 08/13/21  0554 08/12/21  0528   ALKPHOS 110 122 109   BILITOTAL 1.9* 2.0* 2.0*   ALT 27 32 27   AST 54* 83* 68*   PROTTOTAL 6.9 6.9 6.7*   ALBUMIN 1.5* 1.6* 1.6*       Iron Panel -   Recent Labs   Lab Test 07/19/21  0632   IRON 31*   IRONSAT 43         Imaging:      Current Medications:    sodium bicarbonate  325 mg Per Feeding Tube Q4H    And     amylase-lipase-protease "  1-2 capsule Per Feeding Tube Q4H     amylase-lipase-protease  2 capsule Oral TID w/meals     B and C vitamin Complex with folic acid  5 mL Per Feeding Tube Daily     banatrol plus  1 packet Per Feeding Tube TID     ceFEPIme (MAXIPIME) IV  1 g Intravenous Q24H     folic acid  1 mg Oral Daily     [Held by provider] heparin ANTICOAGULANT  5,000 Units Subcutaneous Q12H     HYDROmorphone (STANDARD CONC)  2 mg Oral Q8H     melatonin  6 mg Oral At Bedtime     metroNIDAZOLE  500 mg Intravenous Q8H     - MEDICATION INSTRUCTIONS -   Does not apply Once     sodium chloride (PF)  100 mL Irrigation Q4H LENKA     sodium chloride (PF)  3 mL Intracatheter Q8H     sodium chloride (PF)  3 mL Intracatheter Q8H     sodium chloride (PF)  73 mL Intravenous Once     sodium chloride (PF)  9 mL Intracatheter During Dialysis/CRRT (from stock)     sodium chloride (PF)  9 mL Intracatheter During Dialysis/CRRT (from stock)     vitamin B1  100 mg Oral Daily    Or     thiamine  100 mg Intravenous Daily       - MEDICATION INSTRUCTIONS -       abigail Toledo, NP

## 2021-08-14 NOTE — PROGRESS NOTES
Transfer  Transferred to: 5B  Via:bed  Reason for transfer:Pt no longer appropriate for 6B- improved patient condition  Belongings: Packed and sent with pt  Chart: Delivered with pt to next unit  Medications: Meds sent to new unit with pt  Report given to: Marcia  Pt status: Stable. Agreeable with transfer

## 2021-08-14 NOTE — PROGRESS NOTES
[facility]  Progress Note - [facility] 3 Service        Date of Admission:  6/28/2021    Assessment & Plan   Dax Villareal is a 30 year old male with hx of alcohol use disorder admitted on 6/28/2021 after recent prolonged admission at [facility] in [place] who has severe acute alcoholic hepatitis c/b HE, ESRD requiring HD, and malnutrition in the setting of acute necrotizing pancreatitis, s/p PEG-J placement on 7/13. He arrived with acute respiratory failure and septic shock that have resolved, and his secondary bacterial peritonitis has been appropriately treated. He requires continued management for ESRD, hepatic encephalopathy, infected pancreatic pseudocyst, nutritional support via PEG-J, and persistent right pleural effusion. Clinically improving after multiple necrosectomies.     Today:  - CAPS consulted for paracentesis and thoracentesis as requested by GI with labs ordered.  - Dialysis today  - Discussed new possible hepatic abscess with IR. Given <5 cm, recommend conservative management with antibiotics and will re-evaluate with further imaging as part of capped drain assessment.     # Necrotizing alcoholic pancreatitis w/ infected pseudocyst s/p RP Drain and Cystogastrostomy  # Septic Shock, resolved   # Leukocytosis    Admitted to [facility] ICU on 6/28/2021 from [facility] in [place] for septic shock due to necrotizing pancreatitis. CT on admit showing mature collection with enhancing wall measuring ~[id number]. IR placed perc drain 6/29. Repeat imaging 7/18 revealed walled off necrotic collection amenable to endoscopic transluminal drainage with cystgastrostomy stenting with necrosectomy on 7/21/21.  7/28 - EGD, stent replacement (transluminal Axios with coaxial Solus), PEG-J removal (buried bumper) and replacement of new PEG-J with one new T-tag. Repeat necrosectomy and Axios removal/replacement Friday 8/6. Repeat CT on 8/9. 8/11 - EGD with completion  necrosectomy, no e/o bowel fistula, no plans for further necrosectomy unless further pain/fevers warrants further evaluation     - Continue Cefepime 1 gm Q 24 + flagyl, previously recommended to continue through final debridement, will reevaluate as pt progresses clinically  - Daily CBC, watching fever curve  - GI following, appreciate recs              - Pancreatic enzymes   - Flush drain with 100ml q4hr per GI   - Blood cultures and fluid cultures NGTD    # Secondary Bacterial Peritonitis 2/2 infected pseudocyst  # Peripancreatic fluid collection growing VRE   7/2 - Diagnostic paracentesis (WBC 2663 - 74% PMN, SAAG <1.1, protein 4.1)     Antibiotics:   Current: Cefepime 1 gm Q 24 + flagyl 500 q8 (8/6-present)     Past:  - Linezolid 7/21-7/30  - Vancomcyin 6/29  - Meropenem 6/29-7/18  - Micafungin 6/29-7/1  - Cipro 7/19-7/21  - Ceftriaxone 7/21-7/24; 7/26-7/27  - Zosyn 7/27-8/6    - Paracentesis PRN      # Right hepatic/pancreatic hydrothorax  Intermittently increased subjective shortness of breath. CXR on 7/19 w/ increased RLL haziness; ddx includes pleural effusion v pneumonia v atelectasis. CT on 7/28 showed large volume R hepatic hydrothorax with complete atelectasis of right lung, bedside thora completed 7/28 with labs and cultures.     CXR 7/30 showed moderate R pleural effusion, unchanged from previous and improved aeration of right lung with small remaining pleural effusion on CT. Thora cultures negative. Repeat Thora on 8/9 showing exudative fluid     - IR completed thoracentesis 7/28 - exudative  - ID following: agree to continue with empiric abx, though pleural effusion most likely 2/2 hepatic hydrothorax and pancreatitis. Defer plan for repeat thoracentesis based on symptoms and oxygen requirement. No plan for chest tube given concern for infected pleural effusion  - Supplemental oxygen as needed to keep sats above 89%  - Repeat Thoracentesis PRN for dyspnea    #Hepatic Abscess  CT Ab/pelvis from 8/13  "with developing hepatic abscess vs. infarct. IR consulted. No intervention for abscesses <5 cm and appears more like infarct than abscess. Risk of procedural intervention outweighs benefit at this point. Will reassess with future imaging.  - Blood cultures from 8/13 NGTD  - Para & thora per GI  - Continueing antibiotics    #Hematochezia  Patient CTA-Chest on 8/8 with \"possible\" non-obstructive PE in KALYAN Pulmonary Artery Branch. Patient has largely been bed bound and not on DVT prophylaxis for several days, especially in setting of recent procedures. Troponin negative x2. BRBPR with heparin. Ultrasounds negative for DVT.  - CTA-Chest on 8/10 negative for PE  - Hematology recommends starting prophylactic dose anticoagulation once stable x1 week, stepping up to unfractionated heparin, likely will need 3 months of anticoagulation   - Heme to be contacted prior to discharge when ready to transition to orals    # Lactic acidosis  Multifactorial d/t decreased lactic clearance in the setting of acute hepatic failure, ESRD, and acute on chronic infection.  - Continue to trend and assess vitals     # Gastric Outlet Obstruction  High G tube output due to the cystogastric connection/fluid drainage noted on 8/5. Per GI, possibly related to gastric outlet from severe abdominal inflammation and expected. KUB negative for obstructive gas pattern. Ileus from narcotics considered, but output not consistent with tube feeds. Improved after cystogastrectomy  -Anti-emetics  -Will monitor and continue to discuss with GI  -Having fluid removal per nephrology due to HD    #Hypokalemia  High GI output state. Replete as needed with ESRD noted. Improved with decreased G-tube drainage after cystogastrectomy.    # Severe malnutrition in setting of chronic illness  PEG-J placed 7/13. Patient intermittently refusing tube feeds d/t nausea/vomiting. Improving oral intake s/p necresectomies.  - Dietitian consult for new tube feeds, appreciate " recs   - Continuous TF per dietician order with goal of 65 ml/hr              - Cont panc enzymes   - Will trial regular diet with level 0 liquids as below  - Regular Diet, thin liquids per SLP  - VFSS for 8/10 to evaluate for dysphagia - cleared for level 0 liquids though vomited following  - Continue multivitamins  - Pantoprazole 40 mg IV daily  - Zofran PRN    Malnutrition:    - Level of malnutrition: Severe   - Based on: moderate/severe subcutaneous fat loss, moderate/severe muscle loss     # Alcoholic hepatitis complicated by portal hypertension, ascites, and HE - improving   # Hx of Alcohol use disorder c/b withdrawal   # Anemia  # Coagulopathy   Patient with a history of marked alcohol use and alcoholic hepatitis in the past. Was given a course of steroids (1 week) during previous admission at OSH. Patient mental status has been stable, alert and oriented x4 after rifaxamin and aggressive lactulose. Not a liver transplant candidate.   - Lactulose 20g daily and PRN and rifaximin 550mg BID - as patient allows  - Daily folic acid and thiamine  - Delirium precautions  - Melatonin 6mg at bedtime  - Daily CBC   - Transfuse if hemoglobin < 7, platelet < 10 or < 30 with bleeding  - Continue to monitor for signs of bleeding      # Stage III RADHA on HD due to ATN  # ESRD due to ATN  # Hypervolemia causing acute respiratory failure, resolved  Nephrology note 7/21 indicates that patient has been on renal replacement therapy for two months and pt status has most likely transitioned from ARF to ESRD at this point.      > Hemodialysis as per renal reccs - TTS              - Midodrine 10 mg PRN before dialysis sessions     > Daily chemistry labs, strict I/O, and daily weights  - Aspiration precautions  - Supplemental oxygen as needed to keep sats above 89%    # Anemia  Baseline hgb 17. Running between 6-8 in setting of chronic illness, new ESRD, frequent lab draws and procedures.  - Goal hgb >7.0, trend Hgb    # Intermittent  lactic acidosis  Suspect due to poor lactate clearance due to ESRD and ESLD + ongoing inflammation/infection. No fluids given pt is on dialysis; however, did receive Albumin last night.    # Apathy  Patient is intermittently refusing cares due to wanting a 'break,' but also reaffirms that he wants everything done to prolong his life. Mood is depressed, apathetic at times. Patient has multiple stressors including his own medical conditions and family health issues.  - Continue to offer emotional support services such as psychiatry, palliative,   - He declined psychiatry/psychology visits     # Goals of Care  Please see additional interval note for in depth details regarding conversation on 7/9 about goals of care. Patient's long term prognosis is still quite poor given his multi organ failure. Care conference on 7/15, pt did not wish to participate. Met with sistermargareth Cheney (on phone) and Melyssa and close friend Leroy with palliative, nephrology and general medicine teams to discuss Dax's current improving status, poor prognosis and wishes to continue full cares.  - Hepatology confirmed pt will not be a transplant candidate  - Daily phone updates to sister Noni ([phone])       Diet: Adult Formula Drip Feeding: Continuous Vital 1.5; Jejunostomy; Goal Rate: 60; mL/hr; Medication - Feeding Tube Flush Frequency: At least 15-30 mL water before and after medication administration and with tube clogging; Amount to Send (Nutrition us...  Regular Diet Adult Thin Liquids (level 0)  Calorie Counts    DVT Prophylaxis: Pneumatic Compression Devices  Rdz Catheter: Not present  Fluids: 10 mL NS TKO  Central Lines: PRESENT  PICC Triple Lumen 06/28/21 Left-Site Assessment: WDL  CVC Double Lumen Right-Site Assessment: WDL  Code Status: Full Code      Disposition Plan   Expected discharge: >7 days, recommended to transitional care unit once  nutrition improved, infected pancreatic pseudocyst is well managed and plan for  dialysis is in place.       Johny Ortiz MD  [phone]  [facility] 3  [facility]  Securely message with the [employer] Web Console (learn more here)  Text page via [employer] Paging/Directory  Please see sign in/sign out for up to date coverage information  _____________________________________________________________________    Interval History    Transferred to . Tolerating diet well. Tachycardic. Normal lactate. Normal     Physical Exam   Vital Signs: Temp: 97.8  F (36.6  C) Temp src: Oral BP: 125/66 Pulse: (!) 129   Resp: 18 SpO2: 94 % O2 Device: Nasal cannula Oxygen Delivery: 4 LPM  Weight: 164 lbs 3.88 oz  Constitutional: Awake, non-diaphoretic, conversational regarding upcoming surgery plans  HEENT: Normocephalic. Atraumatic. No scleral icterus.  Cardiovascular: tachycardic, tender chest to palpation diffusely  Respiratory: Tachypnic, Decreased breath sounds on right, NC on for comfort  Abdomen: Tolerates palpation with minimal pain, PEG-tube site clean  Musculoskeletal: Emaciated, moving all extremities on examination  Skin: resolving jaundice    Data   Recent Labs   Lab 08/14/21  0228 08/13/21  0554 08/12/21  0528 08/11/21  0555 08/08/21  2241 08/08/21  1435 08/08/21  0509   WBC  --  20.2* 16.5* 17.0*  --   --  18.8*   HGB  --  8.5* 7.5* 8.2*  --   --  8.0*   MCV  --  91 88 87  --   --  89   PLT  --  298 278 274  --   --  264   INR  --   --   --   --   --   --  1.74*   NA  --  130* 129* 128*   < >  --  129*   POTASSIUM  --  4.1 4.0 3.7   < >  --  3.9   CHLORIDE  --  97 96 97   < >  --  97   CO2  --  26 21 22   < >  --  22   BUN  --  17 28 12   < >  --  13   CR  --  1.76* 2.78* 2.14*   < >  --  2.01*   ANIONGAP  --  7 12 9   < >  --  10   JANENE  --  7.8* 7.6* 8.0*   < >  --  8.0*   * 111* 165* 85   < >  --  128*   ALBUMIN  --  1.6* 1.6* 1.6*   < >  --  1.6*   PROTTOTAL  --  6.9 6.7* 6.9   < >  --  7.0   BILITOTAL  --  2.0* 2.0* 2.2*   < >  --  2.8*   ALKPHOS  --   122 109 109   < >  --  126   ALT  --  32 27 16   < >  --  24   AST  --  83* 68* 26   < >  --  51*   TROPONIN  --   --   --   --   --  <0.015 <0.015    < > = values in this interval not displayed.

## 2021-08-15 ENCOUNTER — ANCILLARY PROCEDURE (OUTPATIENT)
Dept: ULTRASOUND IMAGING | Facility: CLINIC | Age: 31
End: 2021-08-15
Attending: STUDENT IN AN ORGANIZED HEALTH CARE EDUCATION/TRAINING PROGRAM
Payer: COMMERCIAL

## 2021-08-15 LAB
ALBUMIN SERPL-MCNC: 1.5 G/DL (ref 3.4–5)
ALP SERPL-CCNC: 121 U/L (ref 40–150)
ALT SERPL W P-5'-P-CCNC: 25 U/L (ref 0–70)
ANION GAP SERPL CALCULATED.3IONS-SCNC: 6 MMOL/L (ref 3–14)
AST SERPL W P-5'-P-CCNC: 54 U/L (ref 0–45)
BILIRUB SERPL-MCNC: 2.5 MG/DL (ref 0.2–1.3)
BUN SERPL-MCNC: 16 MG/DL (ref 7–30)
CALCIUM SERPL-MCNC: 7.9 MG/DL (ref 8.5–10.1)
CHLORIDE BLD-SCNC: 100 MMOL/L (ref 94–109)
CO2 SERPL-SCNC: 27 MMOL/L (ref 20–32)
CREAT SERPL-MCNC: 1.72 MG/DL (ref 0.66–1.25)
ERYTHROCYTE [DISTWIDTH] IN BLOOD BY AUTOMATED COUNT: 17.3 % (ref 10–15)
GFR SERPL CREATININE-BSD FRML MDRD: 52 ML/MIN/1.73M2
GLUCOSE BLD-MCNC: 132 MG/DL (ref 70–99)
HCT VFR BLD AUTO: 23.9 % (ref 40–53)
HGB BLD-MCNC: 7.6 G/DL (ref 13.3–17.7)
INR PPP: 1.86 (ref 0.85–1.15)
LACTATE SERPL-SCNC: 1.5 MMOL/L (ref 0.7–2)
MAGNESIUM SERPL-MCNC: 1.6 MG/DL (ref 1.6–2.3)
MCH RBC QN AUTO: 29.3 PG (ref 26.5–33)
MCHC RBC AUTO-ENTMCNC: 31.8 G/DL (ref 31.5–36.5)
MCV RBC AUTO: 92 FL (ref 78–100)
PHOSPHATE SERPL-MCNC: 2.5 MG/DL (ref 2.5–4.5)
PLATELET # BLD AUTO: 286 10E3/UL (ref 150–450)
POTASSIUM BLD-SCNC: 3.9 MMOL/L (ref 3.4–5.3)
PROT SERPL-MCNC: 6.9 G/DL (ref 6.8–8.8)
RBC # BLD AUTO: 2.59 10E6/UL (ref 4.4–5.9)
SODIUM SERPL-SCNC: 133 MMOL/L (ref 133–144)
WBC # BLD AUTO: 15.1 10E3/UL (ref 4–11)

## 2021-08-15 PROCEDURE — 85027 COMPLETE CBC AUTOMATED: CPT | Performed by: INTERNAL MEDICINE

## 2021-08-15 PROCEDURE — 36592 COLLECT BLOOD FROM PICC: CPT | Performed by: STUDENT IN AN ORGANIZED HEALTH CARE EDUCATION/TRAINING PROGRAM

## 2021-08-15 PROCEDURE — 250N000013 HC RX MED GY IP 250 OP 250 PS 637: Performed by: INTERNAL MEDICINE

## 2021-08-15 PROCEDURE — 250N000011 HC RX IP 250 OP 636: Performed by: INTERNAL MEDICINE

## 2021-08-15 PROCEDURE — 85610 PROTHROMBIN TIME: CPT

## 2021-08-15 PROCEDURE — 250N000011 HC RX IP 250 OP 636: Performed by: STUDENT IN AN ORGANIZED HEALTH CARE EDUCATION/TRAINING PROGRAM

## 2021-08-15 PROCEDURE — 80053 COMPREHEN METABOLIC PANEL: CPT | Performed by: INTERNAL MEDICINE

## 2021-08-15 PROCEDURE — 99233 SBSQ HOSP IP/OBS HIGH 50: CPT | Mod: GC | Performed by: STUDENT IN AN ORGANIZED HEALTH CARE EDUCATION/TRAINING PROGRAM

## 2021-08-15 PROCEDURE — 84100 ASSAY OF PHOSPHORUS: CPT | Performed by: INTERNAL MEDICINE

## 2021-08-15 PROCEDURE — 83735 ASSAY OF MAGNESIUM: CPT | Performed by: INTERNAL MEDICINE

## 2021-08-15 PROCEDURE — 250N000009 HC RX 250

## 2021-08-15 PROCEDURE — 36592 COLLECT BLOOD FROM PICC: CPT

## 2021-08-15 PROCEDURE — 120N000002 HC R&B MED SURG/OB UMMC

## 2021-08-15 PROCEDURE — 99233 SBSQ HOSP IP/OBS HIGH 50: CPT

## 2021-08-15 PROCEDURE — 83605 ASSAY OF LACTIC ACID: CPT | Performed by: STUDENT IN AN ORGANIZED HEALTH CARE EDUCATION/TRAINING PROGRAM

## 2021-08-15 PROCEDURE — 250N000013 HC RX MED GY IP 250 OP 250 PS 637: Performed by: STUDENT IN AN ORGANIZED HEALTH CARE EDUCATION/TRAINING PROGRAM

## 2021-08-15 PROCEDURE — 250N000013 HC RX MED GY IP 250 OP 250 PS 637

## 2021-08-15 RX ORDER — MAGNESIUM HYDROXIDE 1200 MG/15ML
LIQUID ORAL
Status: COMPLETED
Start: 2021-08-15 | End: 2021-08-15

## 2021-08-15 RX ORDER — METHOCARBAMOL 500 MG/1
500 TABLET, FILM COATED ORAL
Status: COMPLETED | OUTPATIENT
Start: 2021-08-15 | End: 2021-08-15

## 2021-08-15 RX ADMIN — HYDROMORPHONE HYDROCHLORIDE 2 MG: 1 SOLUTION ORAL at 08:03

## 2021-08-15 RX ADMIN — BENZONATATE 100 MG: 100 CAPSULE ORAL at 01:57

## 2021-08-15 RX ADMIN — HYDROXYZINE HYDROCHLORIDE 50 MG: 25 TABLET, FILM COATED ORAL at 20:15

## 2021-08-15 RX ADMIN — HYDROXYZINE HYDROCHLORIDE 50 MG: 25 TABLET, FILM COATED ORAL at 10:05

## 2021-08-15 RX ADMIN — SODIUM BICARBONATE 325 MG: 325 TABLET ORAL at 06:22

## 2021-08-15 RX ADMIN — MELATONIN TAB 3 MG 6 MG: 3 TAB at 22:15

## 2021-08-15 RX ADMIN — HYDROMORPHONE HYDROCHLORIDE 1 MG: 1 SOLUTION ORAL at 12:43

## 2021-08-15 RX ADMIN — PANCRELIPASE 2 CAPSULE: 24000; 76000; 120000 CAPSULE, DELAYED RELEASE PELLETS ORAL at 20:50

## 2021-08-15 RX ADMIN — METRONIDAZOLE 500 MG: 500 INJECTION, SOLUTION INTRAVENOUS at 04:38

## 2021-08-15 RX ADMIN — Medication 1 PACKET: at 21:02

## 2021-08-15 RX ADMIN — HYDROMORPHONE HYDROCHLORIDE 2 MG: 1 SOLUTION ORAL at 00:16

## 2021-08-15 RX ADMIN — ACETAMINOPHEN 325 MG: 325 SOLUTION ORAL at 22:39

## 2021-08-15 RX ADMIN — HYDROMORPHONE HYDROCHLORIDE 1 MG: 1 SOLUTION ORAL at 04:36

## 2021-08-15 RX ADMIN — PANCRELIPASE 2 CAPSULE: 24000; 76000; 120000 CAPSULE, DELAYED RELEASE PELLETS ORAL at 06:21

## 2021-08-15 RX ADMIN — PROCHLORPERAZINE EDISYLATE 5 MG: 5 INJECTION INTRAMUSCULAR; INTRAVENOUS at 15:43

## 2021-08-15 RX ADMIN — METRONIDAZOLE 500 MG: 500 INJECTION, SOLUTION INTRAVENOUS at 11:32

## 2021-08-15 RX ADMIN — SODIUM CHLORIDE 100 ML: 900 IRRIGANT IRRIGATION at 00:16

## 2021-08-15 RX ADMIN — SODIUM BICARBONATE 325 MG: 325 TABLET ORAL at 10:37

## 2021-08-15 RX ADMIN — PANCRELIPASE 2 CAPSULE: 24000; 76000; 120000 CAPSULE, DELAYED RELEASE PELLETS ORAL at 16:20

## 2021-08-15 RX ADMIN — MENTHOL 1 PATCH: 205.5 PATCH TOPICAL at 15:45

## 2021-08-15 RX ADMIN — METRONIDAZOLE 500 MG: 500 INJECTION, SOLUTION INTRAVENOUS at 21:02

## 2021-08-15 RX ADMIN — PANCRELIPASE 2 CAPSULE: 24000; 76000; 120000 CAPSULE, DELAYED RELEASE PELLETS ORAL at 02:21

## 2021-08-15 RX ADMIN — GUAIFENESIN 10 ML: 200 SOLUTION ORAL at 15:46

## 2021-08-15 RX ADMIN — HYDROMORPHONE HYDROCHLORIDE 1 MG: 1 SOLUTION ORAL at 20:20

## 2021-08-15 RX ADMIN — Medication 5 ML: at 08:03

## 2021-08-15 RX ADMIN — CEFEPIME HYDROCHLORIDE 1 G: 1 INJECTION, POWDER, FOR SOLUTION INTRAMUSCULAR; INTRAVENOUS at 20:20

## 2021-08-15 RX ADMIN — HYDROMORPHONE HYDROCHLORIDE 2 MG: 1 SOLUTION ORAL at 15:44

## 2021-08-15 RX ADMIN — METHOCARBAMOL 500 MG: 500 TABLET, FILM COATED ORAL at 15:44

## 2021-08-15 RX ADMIN — ONDANSETRON 4 MG: 2 INJECTION INTRAMUSCULAR; INTRAVENOUS at 01:57

## 2021-08-15 RX ADMIN — SODIUM CHLORIDE: 900 IRRIGANT IRRIGATION at 22:16

## 2021-08-15 RX ADMIN — PANCRELIPASE 2 CAPSULE: 24000; 76000; 120000 CAPSULE, DELAYED RELEASE PELLETS ORAL at 09:21

## 2021-08-15 RX ADMIN — FOLIC ACID 1 MG: 1 TABLET ORAL at 08:03

## 2021-08-15 RX ADMIN — ONDANSETRON 4 MG: 2 INJECTION INTRAMUSCULAR; INTRAVENOUS at 12:44

## 2021-08-15 RX ADMIN — Medication 1 PACKET: at 08:03

## 2021-08-15 RX ADMIN — PANCRELIPASE 2 CAPSULE: 24000; 76000; 120000 CAPSULE, DELAYED RELEASE PELLETS ORAL at 10:37

## 2021-08-15 RX ADMIN — PANCRELIPASE 2 CAPSULE: 24000; 76000; 120000 CAPSULE, DELAYED RELEASE PELLETS ORAL at 17:25

## 2021-08-15 RX ADMIN — THIAMINE HCL TAB 100 MG 100 MG: 100 TAB at 08:03

## 2021-08-15 RX ADMIN — SODIUM BICARBONATE 325 MG: 325 TABLET ORAL at 02:21

## 2021-08-15 RX ADMIN — GUAIFENESIN 10 ML: 200 SOLUTION ORAL at 09:22

## 2021-08-15 RX ADMIN — HYDROXYZINE HYDROCHLORIDE 50 MG: 25 TABLET, FILM COATED ORAL at 01:57

## 2021-08-15 RX ADMIN — Medication 1 PACKET: at 14:11

## 2021-08-15 RX ADMIN — SODIUM BICARBONATE 325 MG: 325 TABLET ORAL at 20:50

## 2021-08-15 RX ADMIN — SODIUM BICARBONATE 325 MG: 325 TABLET ORAL at 16:20

## 2021-08-15 RX ADMIN — ONDANSETRON 4 MG: 2 INJECTION INTRAMUSCULAR; INTRAVENOUS at 22:39

## 2021-08-15 ASSESSMENT — ACTIVITIES OF DAILY LIVING (ADL)
ADLS_ACUITY_SCORE: 17
ADLS_ACUITY_SCORE: 17
ADLS_ACUITY_SCORE: 18
ADLS_ACUITY_SCORE: 17

## 2021-08-15 ASSESSMENT — MIFFLIN-ST. JEOR: SCORE: 1647.75

## 2021-08-15 NOTE — PLAN OF CARE
"Patient denied pain this morning and started having right shoulder pain in the afternoon. PRN Dilauded given along with icy hot patch given with some relief. Declined to have paracentesis and thoracentesis done this morning. Stated that it was \"Sunday and a day of rest.\" Caps team will try again tomorrow. Continues to use accessory and abdominal muscles with breathing. Satting 97% on 4L via nasal cannula. Intermittently coughing throughout the day. Yaunker at bedside and patient able to use independently.Triple lumen intact and blood return noted on all lumens. Caps changed on all as well. Patient able to turn in bed independently but still needs encouragement to lie on side and off of bottom. Is on pulsate mattress and bottom intact. Left drain intact and irrigated every 4 hours as ordered. Large amount of output after each flush. Dressing changed. Tolerating TF at goal rate of 60 ml/hr. G-tube to gravity. Has not had much of an appetite today. Only ate a bite of pizza and a bite of crossaint. BM x2 on bedpan. No urine output as patient is on dialysis.  Brother (Gunner) came to visit and very attentive to patient. Continues to be sinus Tachy with HR in the 120's. Will continue with plan of care.  "

## 2021-08-15 NOTE — PROGRESS NOTES
Assumed cares [id number]    Pt went to dialysis and did not return to floor until 1400, Pt is getting TF to goal at 60 mLs, and is filled q 4hrs have to dissolve creon and sodium bicarb and add to TF. Pt is requesting most meds go through tube. Did update Provider that pt LS were more coarse after dialysis and has been runnig higher heart rate in the 130's. Chest Xray ordered. Also let provider know we do not have an order for telemetry, no order yet will update on coming staff. On 4L of O2. Cares to drain were done this shift drain is patent. Continue to monitor per plan of care.

## 2021-08-15 NOTE — PROVIDER NOTIFICATION
Provider notified (name): Daniela Blackman 3  Reason for notification: Pt asking for muscle relaxant and icy hot patches. Please place orders. Thank you    Response from provider:

## 2021-08-15 NOTE — PROGRESS NOTES
Calorie Count  Intake recorded for: 8/14  Total Kcals: 0 Total Protein: 0g  Kcals from Hospital Food: 0   Protein: 0g  Kcals from Outside Food (average):0 Protein: 0g  # Meals Recorded: 1 meal ordered from kitchen, no intake recorded.   # Supplements Recorded: no intake recorded.

## 2021-08-15 NOTE — PROVIDER NOTIFICATION
D/I: Pt is alert and oriented but restless and dyspneic on 4L oxygen via NC, using accessory and abdominal muscles of respiration, especially after using a bedpan. Pt goes into episodic coughing after using bedpan and getting clean, with concern for possible aspiration of oral secretions when HOB is lowered for gera-cares after each bowel movement. Triggered sepsis and lactate was 1.5. Right Chest dialysis access intact.   Dialysis today with 1L off. Nausea/Emesis, zofran given. On tele showing sinus tachy in the 120s. TF running via J-port of GJ tube at goal of 60 mL with solution + enzymes. G-port to gravity. Left upper abd drain dressing CDI and drain irrigated every 4H. Skin is jaundiced and fragile. Pt repositions independently in bed.   P: Will continue POC.

## 2021-08-15 NOTE — PROGRESS NOTES
Nephrology Progress Note  08/15/2021     Dax Villareal is a 30 year old male with h/o ETOH hepatitis, end stage liver disease, RADHA on HD, transferred from [facility] in [place] for septic shock on 6/28/21 for treatment of necrotizing pancreatitis and decompensated liver disease. He was initially admitted to [facility] 5/19/21.       Assessment & Recommendations:     1 RADHA - Remains dialysis dependent/anuric.   Last HD prior to hospital admission was 6/26/21. He has not had any OP HD prior to this admission. Had been receiving inpatient HD at [facility] prior to transfer to [facility] on 6/28/21   - Etiology of RADHA felt to be JORGE L, abx, pancreatitis, sepsis, HRS   - Has Right TDC   - Will continue T Th Sat schedule   - CRRT/HD consent obtained from sister Noni per phone     2. Volume status - Improved w/o edema. On supplemental oxygen given pleural effusion. Pre run weight 78 kg on 8/14. Admission weight 80.2 kg. -130/ Intake 1520 ml/Output: UO 20 ml, Drain 850 ml.    - Tolerated 1.5 kg UF yesterday   - Continue to UF as tolerated    - Encouraged patient to have the thoracentesis   - Strict I/O please     3. CV - Shock 2/2 Sepsis: Echo on 8/14/21 showed EF of 55 % w/dilated IVC. -130/     4. ETOH hepatitis/ recurrent ascites, HE - Patient with heavy ETOH use. Last ETOH unknown. TB improved from 20's to 1.9 but now rising with pleural effusion. Up to 2.5 today. Off Lactulose/Rifax. Remains on  thiamine, Folate.   - Per GI     5. Electrolytes - No acute concerns. K 3.9, Na 133    - Patient had multiple fluids on his bedside table during HD yesterday   - He should be on a 1.5 liter/day fluid restriction given that he is anuric on HD     6.  Acid base - No acute concerns. Bicarb 27     7. BMD - Ca 7.9, Phos 2.5, albumin 1.5    8. Antimicrobials - Metronidazole and Maxipime for intra abdominal infection. WBC 15.1, Afebrile      9. Anemia - Hgb 7.6   - Iron studies 8/21: Ferritin 2186, Fe 28, IS 21   - He is not iron  "deficient   - Continue Epo 5000 unit(s) IV on HD. He is likely quite Epo resistant given infection/inflammatory state     Recommendations were communicated to primary team via progress note    Tory Toledo, NP   [phone]    Interval History :   Nursing and provider notes from last 24 hours reviewed.  No acute renal changes but has developed a large right pleural effusion. Dax is refusing thoracentesis today because he wants to rest    Review of Systems:   I reviewed the following systems:  Denies dyspnea despite labored breathing  Denies CP, edema  ON TF and trying to eat more  Anuric    Physical Exam:   I/O last 3 completed shifts:  In: 1820 [P.O.:720; Other:500; NG/GT:120]  Out: 1800 [Emesis/NG output:25; Drains:1875]   /66 (BP Location: Right arm)   Pulse (!) 126   Temp 98  F (36.7  C) (Oral)   Resp 22   Ht 1.676 m (5' 6\")   Wt 78 kg (171 lb 15.3 oz)   SpO2 96%   BMI 27.75 kg/m       GENERAL APPEARANCE: labored. Brother Gunner present  EYES:  scleral icterus, pupils equal  Pulmonary: Right lung sound absent, left lung clear. On supplemental oxygen. Breathing appears labored   CV: tachy   - Edema - no LE edema  GI: Non tender. PEG present. Has retroperitoneal drain  SKIN: no rash, warm, dry, no cyanosis  NEURO: alert/interactive  ACCESS: Right TDC     Labs:   All labs reviewed by me  Electrolytes/Renal -   Recent Labs   Lab Test 08/15/21  0520 08/14/21  1014 08/14/21  0228 08/13/21  0554    128*  --  130*   POTASSIUM 3.9 4.0  --  4.1   CHLORIDE 100 96  --  97   CO2 27 25  --  26   BUN 16 29  --  17   CR 1.72* 2.49*  --  1.76*   * 138* 152* 111*   JANENE 7.9* 7.8*  --  7.8*   MAG 1.6 1.8  --  1.6   PHOS 2.5 4.2  --  2.4*       CBC -   Recent Labs   Lab Test 08/15/21  0520 08/14/21  1104 08/13/21  0554   WBC 15.1* 14.8* 20.2*   HGB 7.6* 7.9* 8.5*    277 298       LFTs -   Recent Labs   Lab Test 08/15/21  0520 08/14/21 2055 08/14/21  1014 08/13/21  0554   ALKPHOS 121  --  110 122 "   BILITOTAL 2.5*  --  1.9* 2.0*   ALT 25  --  27 32   AST 54*  --  54* 83*   PROTTOTAL 6.9 7.0 6.9 6.9   ALBUMIN 1.5*  --  1.5* 1.6*       Iron Panel -   Recent Labs   Lab Test 08/14/21 2055 07/19/21  0632   IRON 28* 31*   IRONSAT 21 43   JERRELL 2,186*  --          Imaging:  EXAM: XR CHEST PORT 1 VIEW  8/14/2021 6:09 PM       HISTORY: New cough, increased tachypnea     COMPARISON: Chest x-ray 6/19/2021, chest CT 8/13/2021     FINDINGS: Portable semiupright AP radiograph of the chest. Dual-lumen  right IJ central venous catheter tip projects over the mid SVC.  Partially visualized tubing catheter in the left upper quadrant. Left  upper extremity PICC tip projects over the mid SVC. The trachea is  midline. The cardiac silhouette is partially obscured. Large right  pleural effusion with very little aeration of the right lung. The left  lung is relatively clear. No aggressive osseous lesion                                                                      IMPRESSION:      1. Large right pleural effusion with near total right lung atelectasis  and small residual aerated right lung.  2. Left lung is clear.     I have personally reviewed the examination and initial interpretation  and I agree with the findings.       Current Medications:    sodium bicarbonate  325 mg Per Feeding Tube Q4H    And     amylase-lipase-protease  1-2 capsule Per Feeding Tube Q4H     amylase-lipase-protease  2 capsule Oral TID w/meals     B and C vitamin Complex with folic acid  5 mL Per Feeding Tube Daily     banatrol plus  1 packet Per Feeding Tube TID     ceFEPIme (MAXIPIME) IV  1 g Intravenous Q24H     folic acid  1 mg Oral Daily     [Held by provider] heparin ANTICOAGULANT  5,000 Units Subcutaneous Q12H     HYDROmorphone (STANDARD CONC)  2 mg Oral Q8H     melatonin  6 mg Oral At Bedtime     menthol   Transdermal Q8H     metroNIDAZOLE  500 mg Intravenous Q8H     sodium chloride (PF)  100 mL Irrigation Q4H LENKA     sodium chloride (PF)  3 mL  Intracatheter Q8H     sodium chloride (PF)  3 mL Intracatheter Q8H     sodium chloride (PF)  73 mL Intravenous Once     vitamin B1  100 mg Oral Daily    Or     thiamine  100 mg Intravenous Daily       - MEDICATION INSTRUCTIONS -       abigail Toledo, NP

## 2021-08-15 NOTE — PROVIDER NOTIFICATION
19:00-23:00  Dialysis today with 1L off. VSS ex pulse on 4L NC. A/O x4 and able to make needs known.Pt denied SOB and nausea. Pt tachycardic during shift, 120-140 with spikes to 150. Passed on to night RN that MD aware of tachycardia from previous shift. TF running at goal of 60 mL with solution + enzymes. G/J tube to gravity with enteral feedings in J tube running. Peg dressing CDI, no drainage noted. Abd drain to gravity, dressing CDI. Irrigation passed on to next shift to be completed on q4h schedule. Skin is jaundiced, mucous membranes dry. Pt turns independently in bed. Will continue POC.

## 2021-08-15 NOTE — PROGRESS NOTES
[facility]  Progress Note - Daniela 3 Service        Date of Admission:  6/28/2021    Assessment & Plan   Dax Villareal is a 30 year old male with hx of alcohol use disorder admitted on 6/28/2021 after recent prolonged admission at [facility] in [place] who has severe acute alcoholic hepatitis c/b HE, ESRD requiring HD, and malnutrition in the setting of acute necrotizing pancreatitis, s/p PEG-J placement on 7/13. He arrived with acute respiratory failure and septic shock that have resolved, and his secondary bacterial peritonitis has been appropriately treated. He requires continued management for ESRD, hepatic encephalopathy, infected pancreatic pseudocyst, nutritional support via PEG-J, and persistent right pleural effusion. Clinically improving after multiple necrosectomies.     Today:  - Planned for Thora/Para, but patient requested deferral to tomorrow.    # Necrotizing alcoholic pancreatitis w/ infected pseudocyst s/p RP Drain and Cystogastrostomy  # Septic Shock, resolved   # Leukocytosis    Admitted to the [facility] ICU on 6/28/2021 from [facility] in [place] for septic shock due to necrotizing pancreatitis. CT on admit showing mature collection with enhancing wall measuring ~[id number]. IR placed perc drain 6/29. Repeat imaging 7/18 revealed walled off necrotic collection amenable to endoscopic transluminal drainage with cystgastrostomy stenting with necrosectomy on 7/21/21.  7/28 - EGD, stent replacement (transluminal Axios with coaxial Solus), PEG-J removal (buried bumper) and replacement of new PEG-J with one new T-tag. Repeat necrosectomy and Axios removal/replacement Friday 8/6. Repeat CT on 8/9. 8/11 - EGD with completion necrosectomy, no e/o bowel fistula, no plans for further necrosectomy unless further pain/fevers warrants further evaluation     - Continue Cefepime 1 gm Q 24 + flagyl, previously recommended to continue through final debridement, will  reevaluate as pt progresses clinically  - Daily CBC, watching fever curve  - GI following, appreciate recs              - Pancreatic enzymes   - Flush drain with 100ml q4hr per GI   - Blood cultures and fluid cultures NGTD    # Secondary Bacterial Peritonitis 2/2 infected pseudocyst  # Peripancreatic fluid collection growing VRE   7/2 - Diagnostic paracentesis (WBC 2663 - 74% PMN, SAAG <1.1, protein 4.1)     Antibiotics:   Current: Cefepime 1 gm Q 24 + flagyl 500 q8 (8/6-present)     Past:  - Linezolid 7/21-7/30  - Vancomcyin 6/29  - Meropenem 6/29-7/18  - Micafungin 6/29-7/1  - Cipro 7/19-7/21  - Ceftriaxone 7/21-7/24; 7/26-7/27  - Zosyn 7/27-8/6    - Paracentesis PRN      # Right hepatic/pancreatic hydrothorax  Intermittently increased subjective shortness of breath. CXR on 7/19 w/ increased RLL haziness; ddx includes pleural effusion v pneumonia v atelectasis. CT on 7/28 showed large volume R hepatic hydrothorax with complete atelectasis of right lung, bedside thora completed 7/28 with labs and cultures.     CXR 7/30 showed moderate R pleural effusion, unchanged from previous and improved aeration of right lung with small remaining pleural effusion on CT. Thora cultures negative. Repeat Thora on 8/9 showing exudative fluid     - IR completed thoracentesis 7/28 - exudative  - ID following: agree to continue with empiric abx, though pleural effusion most likely 2/2 hepatic hydrothorax and pancreatitis. Defer plan for repeat thoracentesis based on symptoms and oxygen requirement. No plan for chest tube given concern for infected pleural effusion  - Supplemental oxygen as needed to keep sats above 89%  - Repeat Thoracentesis PRN for dyspnea    #Hepatic Abscess  CT Ab/pelvis from 8/13 with developing hepatic abscess vs. infarct. IR consulted. No intervention for abscesses <5 cm and appears more like infarct than abscess. Risk of procedural intervention outweighs benefit at this point.   - Will reassess with future  "imaging.  - Blood cultures from 8/13 NGTD  - Para & thora per GI  - Continueing antibiotics    #Hematochezia  Patient CTA-Chest on 8/8 with \"possible\" non-obstructive PE in [facility] Pulmonary Artery Branch. Patient has largely been bed bound and not on DVT prophylaxis for several days, especially in setting of recent procedures. Troponin negative x2. BRBPR with heparin. Ultrasounds negative for DVT.  - CTA-Chest on 8/10 negative for PE  - Hematology recommends starting prophylactic dose anticoagulation once stable x1 week, stepping up to unfractionated heparin, likely will need 3 months of anticoagulation   - Heme to be contacted prior to discharge when ready to transition to orals    # Lactic acidosis  Multifactorial d/t decreased lactic clearance in the setting of acute hepatic failure, ESRD, and acute on chronic infection.  - Continue to trend and assess vitals     # Gastric Outlet Obstruction  High G tube output due to the cystogastric connection/fluid drainage noted on 8/5. Per GI, possibly related to gastric outlet from severe abdominal inflammation and expected. KUB negative for obstructive gas pattern. Ileus from narcotics considered, but output not consistent with tube feeds. Improved after cystogastrectomy  -Anti-emetics  -Will monitor and continue to discuss with GI  -Having fluid removal per nephrology due to HD    #Hypokalemia  High GI output state. Replete as needed with ESRD noted. Improved with decreased G-tube drainage after cystogastrectomy.    # Severe malnutrition in setting of chronic illness  PEG-J placed 7/13. Patient intermittently refusing tube feeds d/t nausea/vomiting. Improving oral intake s/p necresectomies.  - Dietitian consult for new tube feeds, appreciate recs   - Continuous TF per dietician order with goal of 65 ml/hr              - Cont panc enzymes   - Will trial regular diet with level 0 liquids as below  - Regular Diet, thin liquids per SLP  - VFSS for 8/10 to evaluate for dysphagia " - cleared for level 0 liquids though vomited following  - Continue multivitamins  - Pantoprazole 40 mg IV daily  - Zofran PRN    Malnutrition:    - Level of malnutrition: Severe   - Based on: moderate/severe subcutaneous fat loss, moderate/severe muscle loss     # Alcoholic hepatitis complicated by portal hypertension, ascites, and HE - improving   # Hx of Alcohol use disorder c/b withdrawal   # Anemia  # Coagulopathy   Patient with a history of marked alcohol use and alcoholic hepatitis in the past. Was given a course of steroids (1 week) during previous admission at OSH. Patient mental status has been stable, alert and oriented x4 after rifaxamin and aggressive lactulose. Not a liver transplant candidate.   - Lactulose 20g daily and PRN and rifaximin 550mg BID - as patient allows  - Daily folic acid and thiamine  - Delirium precautions  - Melatonin 6mg at bedtime  - Daily CBC   - Transfuse if hemoglobin < 7, platelet < 10 or < 30 with bleeding  - Continue to monitor for signs of bleeding      # Stage III RADHA on HD due to ATN  # ESRD due to ATN  # Hypervolemia causing acute respiratory failure, resolved  Nephrology note 7/21 indicates that patient has been on renal replacement therapy for two months and pt status has most likely transitioned from ARF to ESRD at this point.      > Hemodialysis as per renal reccs - TTS              - Midodrine 10 mg PRN before dialysis sessions     > Daily chemistry labs, strict I/O, and daily weights  - Aspiration precautions  - Supplemental oxygen as needed to keep sats above 89%    # Anemia  Baseline hgb 17. Running between 6-8 in setting of chronic illness, new ESRD, frequent lab draws and procedures.  - Goal hgb >7.0, trend Hgb    # Intermittent lactic acidosis  Suspect due to poor lactate clearance due to ESRD and ESLD + ongoing inflammation/infection. No fluids given pt is on dialysis; however, did receive Albumin last night.    # Apathy  Patient is intermittently refusing  cares due to wanting a 'break,' but also reaffirms that he wants everything done to prolong his life. Mood is depressed, apathetic at times. Patient has multiple stressors including his own medical conditions and family health issues.  - Continue to offer emotional support services such as psychiatry, palliative,   - He declined psychiatry/psychology visits     # Goals of Care  Poor prognosis with multiorgan failure and non-compliance with treatment team. Palliative care and care conference on 7/19. Patient not participatory. Prognosis is gaurded with long course to rehab  - Hepatology confirmed pt will not be a transplant candidate  - Daily phone updates to sister Noni ([phone])       Diet: Adult Formula Drip Feeding: Continuous Vital 1.5; Jejunostomy; Goal Rate: 60; mL/hr; Medication - Feeding Tube Flush Frequency: At least 15-30 mL water before and after medication administration and with tube clogging; Amount to Send (Nutrition us...  Regular Diet Adult Thin Liquids (level 0)  Calorie Counts    DVT Prophylaxis: Pneumatic Compression Devices  Rdz Catheter: Not present  Fluids: 10 mL NS TKO  Central Lines: PRESENT  PICC Triple Lumen 06/28/21 Left-Site Assessment: WDL  CVC Double Lumen Right-Site Assessment: WDL  Code Status: Full Code      Disposition Plan   Expected discharge: >7 days, recommended to transitional care unit once  nutrition improved, infected pancreatic pseudocyst is well managed and plan for dialysis is in place.       Johny Ortiz MD  [phone]  [address]  Securely message with the Vocera Web Console (learn more here)  Text page via cisimple Paging/Directory  Please see sign in/sign out for up to date coverage information  _____________________________________________________________________    Interval History    No acute events or changes in symptoms. Remains tachycardic. Emesis x2 today. Diarrhea improved, no urine  output.    Physical Exam   Vital Signs: Temp: (!) 96  F (35.6  C) Temp src: Oral BP: 130/60 Pulse: (!) 128   Resp: 22 SpO2: 95 % O2 Device: Nasal cannula Oxygen Delivery: 4 LPM  Weight: 171 lbs 15.34 oz  Constitutional: Awake, non-diaphoretic, conversational regarding upcoming surgery plans  HEENT: Normocephalic. Atraumatic. No scleral icterus.  Cardiovascular: tachycardic, tender chest to palpation diffusely  Respiratory: Tachypnic, Decreased breath sounds on right, NC on for comfort  Abdomen: Tolerates palpation with minimal pain, PEG-tube site clean  Musculoskeletal: Emaciated, moving all extremities on examination  Skin: no Jaundice    Data   Recent Labs   Lab 08/15/21  0520 08/14/21  2055 08/14/21  1104 08/14/21  1014 08/14/21  0228 08/13/21  0554 08/11/21  0729 08/08/21  2241 08/08/21  1435   WBC 15.1*  --  14.8*  --   --  20.2*  --   --   --    HGB 7.6*  --  7.9*  --   --  8.5*  --   --   --    MCV 92  --  91  --   --  91  --   --   --      --  277  --   --  298  --   --   --      --   --  128*  --  130*  --    < >  --    POTASSIUM 3.9  --   --  4.0  --  4.1  --    < >  --    CHLORIDE 100  --   --  96  --  97  --    < >  --    CO2 27  --   --  25  --  26  --    < >  --    BUN 16  --   --  29  --  17  --    < >  --    CR 1.72*  --   --  2.49*  --  1.76*  --    < >  --    ANIONGAP 6  --   --  7  --  7  --    < >  --    JANENE 7.9*  --   --  7.8*  --  7.8*  --    < >  --    *  --   --  138* 152* 111*  --    < >  --    ALBUMIN 1.5*  --   --  1.5*  --  1.6*   < >   < >  --    PROTTOTAL 6.9 7.0  --  6.9  --  6.9   < >   < >  --    BILITOTAL 2.5*  --   --  1.9*  --  2.0*   < >   < >  --    ALKPHOS 121  --   --  110  --  122   < >   < >  --    ALT 25  --   --  27  --  32   < >   < >  --    AST 54*  --   --  54*  --  83*   < >   < >  --    TROPONIN  --   --   --   --   --   --   --   --  <0.015    < > = values in this interval not displayed.

## 2021-08-16 ENCOUNTER — APPOINTMENT (OUTPATIENT)
Dept: PHYSICAL THERAPY | Facility: CLINIC | Age: 31
End: 2021-08-16
Attending: INTERNAL MEDICINE
Payer: MEDICAID

## 2021-08-16 LAB
ALBUMIN SERPL-MCNC: 1.4 G/DL (ref 3.4–5)
ALP SERPL-CCNC: 113 U/L (ref 40–150)
ALT SERPL W P-5'-P-CCNC: 22 U/L (ref 0–70)
ANION GAP SERPL CALCULATED.3IONS-SCNC: 10 MMOL/L (ref 3–14)
AST SERPL W P-5'-P-CCNC: 45 U/L (ref 0–45)
BACTERIA PLR CULT: NORMAL
BILIRUB SERPL-MCNC: 1.8 MG/DL (ref 0.2–1.3)
BUN SERPL-MCNC: 28 MG/DL (ref 7–30)
CALCIUM SERPL-MCNC: 8.1 MG/DL (ref 8.5–10.1)
CHLORIDE BLD-SCNC: 98 MMOL/L (ref 94–109)
CO2 SERPL-SCNC: 24 MMOL/L (ref 20–32)
CREAT SERPL-MCNC: 2.32 MG/DL (ref 0.66–1.25)
ERYTHROCYTE [DISTWIDTH] IN BLOOD BY AUTOMATED COUNT: 17.7 % (ref 10–15)
GFR SERPL CREATININE-BSD FRML MDRD: 36 ML/MIN/1.73M2
GLUCOSE BLD-MCNC: 136 MG/DL (ref 70–99)
HCT VFR BLD AUTO: 24.8 % (ref 40–53)
HGB BLD-MCNC: 7.8 G/DL (ref 13.3–17.7)
LACTATE SERPL-SCNC: 1.7 MMOL/L (ref 0.7–2)
MAGNESIUM SERPL-MCNC: 1.6 MG/DL (ref 1.6–2.3)
MCH RBC QN AUTO: 29.7 PG (ref 26.5–33)
MCHC RBC AUTO-ENTMCNC: 31.5 G/DL (ref 31.5–36.5)
MCV RBC AUTO: 94 FL (ref 78–100)
PHOSPHATE SERPL-MCNC: 4.1 MG/DL (ref 2.5–4.5)
PLATELET # BLD AUTO: 289 10E3/UL (ref 150–450)
POTASSIUM BLD-SCNC: 4.2 MMOL/L (ref 3.4–5.3)
PROT SERPL-MCNC: 6.8 G/DL (ref 6.8–8.8)
RBC # BLD AUTO: 2.63 10E6/UL (ref 4.4–5.9)
SODIUM SERPL-SCNC: 132 MMOL/L (ref 133–144)
UPPER GI ENDOSCOPY: NORMAL
WBC # BLD AUTO: 13.1 10E3/UL (ref 4–11)

## 2021-08-16 PROCEDURE — 250N000013 HC RX MED GY IP 250 OP 250 PS 637: Performed by: INTERNAL MEDICINE

## 2021-08-16 PROCEDURE — 99233 SBSQ HOSP IP/OBS HIGH 50: CPT | Performed by: CLINICAL NURSE SPECIALIST

## 2021-08-16 PROCEDURE — 120N000002 HC R&B MED SURG/OB UMMC

## 2021-08-16 PROCEDURE — 36592 COLLECT BLOOD FROM PICC: CPT | Performed by: STUDENT IN AN ORGANIZED HEALTH CARE EDUCATION/TRAINING PROGRAM

## 2021-08-16 PROCEDURE — 250N000011 HC RX IP 250 OP 636: Performed by: INTERNAL MEDICINE

## 2021-08-16 PROCEDURE — 250N000011 HC RX IP 250 OP 636: Performed by: STUDENT IN AN ORGANIZED HEALTH CARE EDUCATION/TRAINING PROGRAM

## 2021-08-16 PROCEDURE — 250N000013 HC RX MED GY IP 250 OP 250 PS 637

## 2021-08-16 PROCEDURE — 83605 ASSAY OF LACTIC ACID: CPT | Performed by: STUDENT IN AN ORGANIZED HEALTH CARE EDUCATION/TRAINING PROGRAM

## 2021-08-16 PROCEDURE — 84100 ASSAY OF PHOSPHORUS: CPT | Performed by: INTERNAL MEDICINE

## 2021-08-16 PROCEDURE — 36592 COLLECT BLOOD FROM PICC: CPT | Performed by: INTERNAL MEDICINE

## 2021-08-16 PROCEDURE — 97530 THERAPEUTIC ACTIVITIES: CPT | Mod: GP

## 2021-08-16 PROCEDURE — 99231 SBSQ HOSP IP/OBS SF/LOW 25: CPT | Mod: GC | Performed by: INTERNAL MEDICINE

## 2021-08-16 PROCEDURE — 83735 ASSAY OF MAGNESIUM: CPT | Performed by: INTERNAL MEDICINE

## 2021-08-16 PROCEDURE — 99233 SBSQ HOSP IP/OBS HIGH 50: CPT | Mod: GC | Performed by: STUDENT IN AN ORGANIZED HEALTH CARE EDUCATION/TRAINING PROGRAM

## 2021-08-16 PROCEDURE — 82040 ASSAY OF SERUM ALBUMIN: CPT | Performed by: INTERNAL MEDICINE

## 2021-08-16 PROCEDURE — 85027 COMPLETE CBC AUTOMATED: CPT | Performed by: INTERNAL MEDICINE

## 2021-08-16 PROCEDURE — 250N000013 HC RX MED GY IP 250 OP 250 PS 637: Performed by: STUDENT IN AN ORGANIZED HEALTH CARE EDUCATION/TRAINING PROGRAM

## 2021-08-16 PROCEDURE — 80053 COMPREHEN METABOLIC PANEL: CPT | Performed by: INTERNAL MEDICINE

## 2021-08-16 RX ADMIN — SODIUM BICARBONATE 325 MG: 325 TABLET ORAL at 20:32

## 2021-08-16 RX ADMIN — PANCRELIPASE 2 CAPSULE: 24000; 76000; 120000 CAPSULE, DELAYED RELEASE PELLETS ORAL at 04:30

## 2021-08-16 RX ADMIN — SODIUM BICARBONATE 325 MG: 325 TABLET ORAL at 15:31

## 2021-08-16 RX ADMIN — METRONIDAZOLE 500 MG: 500 INJECTION, SOLUTION INTRAVENOUS at 20:32

## 2021-08-16 RX ADMIN — ACETAMINOPHEN 325 MG: 325 SOLUTION ORAL at 13:59

## 2021-08-16 RX ADMIN — HYDROMORPHONE HYDROCHLORIDE 1 MG: 1 SOLUTION ORAL at 10:39

## 2021-08-16 RX ADMIN — FOLIC ACID 1 MG: 1 TABLET ORAL at 08:48

## 2021-08-16 RX ADMIN — PANCRELIPASE 2 CAPSULE: 24000; 76000; 120000 CAPSULE, DELAYED RELEASE PELLETS ORAL at 10:38

## 2021-08-16 RX ADMIN — METRONIDAZOLE 500 MG: 500 INJECTION, SOLUTION INTRAVENOUS at 12:32

## 2021-08-16 RX ADMIN — HYDROMORPHONE HYDROCHLORIDE 1 MG: 1 SOLUTION ORAL at 20:44

## 2021-08-16 RX ADMIN — Medication 1 PACKET: at 08:49

## 2021-08-16 RX ADMIN — SODIUM BICARBONATE 325 MG: 325 TABLET ORAL at 00:43

## 2021-08-16 RX ADMIN — Medication 1 PACKET: at 20:32

## 2021-08-16 RX ADMIN — GUAIFENESIN 10 ML: 200 SOLUTION ORAL at 08:47

## 2021-08-16 RX ADMIN — PANCRELIPASE 2 CAPSULE: 24000; 76000; 120000 CAPSULE, DELAYED RELEASE PELLETS ORAL at 20:31

## 2021-08-16 RX ADMIN — THIAMINE HCL TAB 100 MG 100 MG: 100 TAB at 08:48

## 2021-08-16 RX ADMIN — HYDROMORPHONE HYDROCHLORIDE 2 MG: 1 SOLUTION ORAL at 00:43

## 2021-08-16 RX ADMIN — SODIUM BICARBONATE 325 MG: 325 TABLET ORAL at 08:45

## 2021-08-16 RX ADMIN — HYDROXYZINE HYDROCHLORIDE 50 MG: 25 TABLET, FILM COATED ORAL at 13:59

## 2021-08-16 RX ADMIN — ONDANSETRON 4 MG: 2 INJECTION INTRAMUSCULAR; INTRAVENOUS at 08:47

## 2021-08-16 RX ADMIN — MELATONIN TAB 3 MG 6 MG: 3 TAB at 22:06

## 2021-08-16 RX ADMIN — Medication 1 PACKET: at 15:32

## 2021-08-16 RX ADMIN — Medication 5 ML: at 08:45

## 2021-08-16 RX ADMIN — HYDROMORPHONE HYDROCHLORIDE 2 MG: 1 SOLUTION ORAL at 08:44

## 2021-08-16 RX ADMIN — PANCRELIPASE 2 CAPSULE: 24000; 76000; 120000 CAPSULE, DELAYED RELEASE PELLETS ORAL at 15:31

## 2021-08-16 RX ADMIN — HYDROMORPHONE HYDROCHLORIDE 1 MG: 1 SOLUTION ORAL at 03:45

## 2021-08-16 RX ADMIN — PANCRELIPASE 2 CAPSULE: 24000; 76000; 120000 CAPSULE, DELAYED RELEASE PELLETS ORAL at 14:05

## 2021-08-16 RX ADMIN — PANCRELIPASE 2 CAPSULE: 24000; 76000; 120000 CAPSULE, DELAYED RELEASE PELLETS ORAL at 00:43

## 2021-08-16 RX ADMIN — HYDROMORPHONE HYDROCHLORIDE 2 MG: 1 SOLUTION ORAL at 16:54

## 2021-08-16 RX ADMIN — METRONIDAZOLE 500 MG: 500 INJECTION, SOLUTION INTRAVENOUS at 04:29

## 2021-08-16 RX ADMIN — CEFEPIME HYDROCHLORIDE 1 G: 1 INJECTION, POWDER, FOR SOLUTION INTRAMUSCULAR; INTRAVENOUS at 20:32

## 2021-08-16 RX ADMIN — HYDROXYZINE HYDROCHLORIDE 50 MG: 25 TABLET, FILM COATED ORAL at 03:45

## 2021-08-16 RX ADMIN — SODIUM BICARBONATE 325 MG: 325 TABLET ORAL at 04:30

## 2021-08-16 ASSESSMENT — ACTIVITIES OF DAILY LIVING (ADL)
ADLS_ACUITY_SCORE: 17
ADLS_ACUITY_SCORE: 20
ADLS_ACUITY_SCORE: 17
ADLS_ACUITY_SCORE: 18
ADLS_ACUITY_SCORE: 17
ADLS_ACUITY_SCORE: 17

## 2021-08-16 ASSESSMENT — MIFFLIN-ST. JEOR: SCORE: 1682.75

## 2021-08-16 NOTE — PROGRESS NOTES
Calorie Count    Intake recorded for: 8/15/2021  Total Kcals: 46 Total Protein: 1g    Kcals from Hospital Food: 0   Protein: 0g    Kcals from Outside Food (average):46 Protein: 1g    # Meals Ordered from Kitchen: 1 meal ordered     # Meals Recorded: 1 recorded (less than 25% pepperoni & sausage pizza, croissant)    # Supplements Recorded: no intake recorded

## 2021-08-16 NOTE — PROVIDER NOTIFICATION
D/I: Pt is alert and oriented x4. Tachycardic in the 120s and dyspneic on 4L oxygen via NC, using accessory and abdominal muscles of respiration. Right Chest dialysis access intact. On tele showing sinus tachy. GJ tube patent with J--port running TF @ 60 mL/hr with solution + enzymes/NaBicarb Q4h. G-port to gravity for venting. Left upper abd drain dressing CDI and drain irrigated every 4H. Skin is jaundiced and fragile. Pt repositions independently in bed.   P: Will continue to monitor pt and follow POC.

## 2021-08-16 NOTE — PLAN OF CARE
"Vital signs:  Temp: 97  F (36.1  C) Temp src: Oral BP: 133/63 Pulse: (!) 127   Resp: 22 SpO2: 96 % O2 Device: Nasal cannula Oxygen Delivery: 4 LPM Height: 167.6 cm (5' 6\") Weight: 75 kg (165 lb 5.5 oz)    Shift: 1253-1662  Activity: Assist of 2, lift. Able to reposition independently in bed  Neuros: WDL ex slightly lethargic, arouses easily. A&Ox4, calls appropriately. Atarax given x1  Cardiac: WDL ex tachycardic  Respiratory: On 4L NC, labored breathing, abdominal and accessory muscle use. LS coarse.   GI/: No BM this shift. G/J tube. G tube clamped (order to vent if needed) and J tube for TF. Pt can swallow medications. Oliguric, on HD  Diet: Regular, continuous TF at 60 mL/hr  Lines: L TL PICC infusing TKO with abx and SL.   Incisions/Drains: L peritoneal drain flushed per orders.   Pain/nausea: PRN PO dilaudid given x1,  IV zofran given x1.    Plan:   Continue POC        "

## 2021-08-16 NOTE — PROVIDER NOTIFICATION
Provider notified (name): Daniela Blackman 3  Reason for notification: Pt aspirating on ice chips & after 2 bites of apple sauce/meds. 1 emesis. Do you want speech consult?   Recommendation/request given to provider: Suggesting NPO for aspiration & SOB.   Response from provider: Pt placed on NPO.

## 2021-08-16 NOTE — PROGRESS NOTES
[facility]    GASTROENTEROLOGY PROGRESS NOTE    ASSESSMENT/RECOMMENDATIONS:  30 year old male with a history of heavy alcohol use who presented to OSH 5/19 for abdominal pain, nausea and vomiting, admitted for alcohol hepatitis, ESLD, RADHA on HD, septic shock requiring pressors as well as necrotizing pancreatitis with large evolving necrotic collection. Transferred to [facility] for consideration of drainage of presumed infected necrotic collection. S/p EUS-guided necrosectomy (7/21/21).     #. Liver abscess vs infarct  Seen on CT (8/13/21), agree w/ IR consult, size likely too small to drain. Blood cultures negative x 2. Would recommend treatment with antibiotics.     #. Acute necrotizing pancreatitis with presumed infected walled off necrotic collection s/p percutaneous and endoscopic drainage   #. Septic shock, resolved  #. Leukocytosis  #. Ascites/Peritonitis  Unclear evolution of pancreatitis history but had a recent CT scan with large evolving necrotic collection, presumed infected. No obvious gas in collection. CT on admission to [facility] showing mature collection with enhancing wall measuring ~[id number]. With profound coagulopathy (liver disease + malnutrition +sepsis), INR 3.8 on transfer he was given FFP and Vit K, IR placed perc drain 6/29. Repeat imaging 7/18 with still large walled off necrotic collection that appeared amenable to endoscopic transluminal drainage, underwent cystgastrostomy stenting on 7/21/21, extensive debridement/necrosectomy 8/6, final near complete/suffiecient debridement with evacuation of solid necrosis in right side of cavity 8/11,two 10 x 1 cm Solus stents placed across cavity, no current plans for future necrosectomies.  Etiology: alcohol  Date of onset: 5/19  BISAP score on admission: unclear  Concurrent organ failure: respiratory (now extubated), renal (on HD), liver, cardiogenic (weaned off pressors  6/29)  Thromboses: none  Diabetes: no  Current nutrition access: PEG-J + po diet  Last imaging: CT scan with IV contrast 8/13  Infection/antiinfectives:                 - Meropenem (6/29 - 7/18), Ciprofloxacin (7/19 - 7/21), Vancomycin (6/29 - 6/30), Micafungin (6/29 - 7/1), Ceftriaxone (7/21 - 7/23), Linezolid (7/21 - 7/30), Zosyn (7/27-8/6), Cefepime (8/6 - present), Metronidazole (8/6 - present)    Intervention:                 6/30 - Left IR RP perc drain (24F)                 7/2 - Diagnostic paracentesis (WBC 2663 - 74% PMN, SAAG <1.1, protein 4.1)                  7/5 - Repeat Paracentesis (WBC 1801 - 61% PMN, lipase 48)                 7/13 - Diag/therapeutic paracentesis ()                 7/13 - PEG-J placement with T-tag gastropexy                 7/21 - Repeat paracentesis                 7/21 - EUS-cystgastrostomy and necrosectomy, Axios with co-axial Viabil                 7/28 - EGD, stent replacement (transluminal Axios with coaxial Solus), PEG-J removal (buried bumper) and replacement of new PEG-J with one new T-tag                 8/6 - EGD with necrosectomy and near complete necrosectomy                 8/11 - EGD with completion necrosectomy, no e/o bowel fistula     - no further necrosectomies anticipated  -- consult IR re: capping perc drain and eventual removal  -- PRN paracentesis  -- continue antibiotics until drain removed  -- pain meds per primary team  -- from GI perspective, we are comfortable with discharge to TCU vs rehab center     #. Hematochezia, resolved  Two episodes of BRBPR 8/8-8/9. Likely related to oozing after necrosectomy in the setting of new anticoagulation for questionable PE (CT Chest 8/8 with concern for PE). Hgb stable. Bleeding stopped after anticoagulation was stopped. Would continue to hold anticoagulation for now. Hemodynamics unchanged. CTA 8/10 without PE  -- follow H/H  -- hold AC for now     #. Severe malnutrition in the context of chronic illness  #.  "Gastric outlet obstruction s/p PEG-J placement  #. Mild to moderate EPI  #. Concern for bowel fistula  Patient with very poor oral intake over the course of his illness and was not meeting calorie requirements orally. S/p PEG-J with T-tag gastropexy (x3) 7/13. Overall improved. Had one day of very large output from perc drain that appeared to look like tube feeds -- no e/o bowel fistula on fluroscopy.   -- g tube to gravity   -- diet as tolerated  -- continue tube feeds with PERT     #. Alcohol hepatitis  #. Hyperbilirubinemia  #. Coagulopathy  #. Encephalopathy, improved  Known heavy alcohol use. Reportedly received course of steroids at OSH for 1 week with \"some improvement\". Currently has primarily hyperbilirubinemia and mildly elevated AST. Unclear if he has underlying cirrhosis but certainly would fit with alcohol hepatitis vs elevated liver tests of critical illness/sepsis. Not candidate for steroids right now. He is receiving lactulose and Xifaxin for encephalopathy. Hepatology notified of admission and they have weighed in on liver transplant status 7/22 (Makayla Glass APRN CNP)  -- trend MELD labs  -- alcohol abstinence     Panc/bili GI team will continue to follow with you.      The patient was seen and discussed and plan agreed upon with GI staff, Dr. Ramos Cifuentes MD  Gastroenterology Fellow, PGY4  [id number]    _______________________________________________________________  S: NIMCO. RN notes reviewed. Minimal if any abdominal pain this AM. Denies nausea, vomiting, fever, chills. Per drain with 200 CC of seropurulent material. Dax tells me today that his primary team informed him he doesn't need anything drained (para or thora) today. All questions answered, plan of care understood.     O:  Blood pressure 128/62, pulse 119, temperature 98.1  F (36.7  C), temperature source Oral, resp. rate 20, height 1.676 m (5' 6\"), weight 75 kg (165 lb 5.5 oz), SpO2 96 %.    Gen: Laying in bed. Appears " comfortable  HEENT: NCAT. Conjunctiva clear. Sclera + mild icteric, poor dentition  Resp: non labored breathing  Abd: mild distention, soft, NT, GJ tube with tube feeds through J and minimal bilious output in G tube, flank drain with 200 ml of seropurulent material  Skin: + mild jaundice  Ext: warm, well perfused  Mental status/Psych: alert and oriented, pleasant      LABS:  BMP  Recent Labs   Lab 08/16/21  0533 08/15/21  0520 08/14/21  1014 08/14/21  0228 08/13/21  0554   * 133 128*  --  130*   POTASSIUM 4.2 3.9 4.0  --  4.1   CHLORIDE 98 100 96  --  97   JANENE 8.1* 7.9* 7.8*  --  7.8*   CO2 24 27 25  --  26   BUN 28 16 29  --  17   CR 2.32* 1.72* 2.49*  --  1.76*   * 132* 138* 152* 111*     CBC  Recent Labs   Lab 08/16/21  0533 08/15/21  0520 08/14/21  1104 08/13/21  0554   WBC 13.1* 15.1* 14.8* 20.2*   RBC 2.63* 2.59* 2.74* 2.85*   HGB 7.8* 7.6* 7.9* 8.5*   HCT 24.8* 23.9* 24.8* 25.9*   MCV 94 92 91 91   MCH 29.7 29.3 28.8 29.8   MCHC 31.5 31.8 31.9 32.8   RDW 17.7* 17.3* 16.8* 16.5*    286 277 298     INR  Recent Labs   Lab 08/15/21  1103   INR 1.86*     LFTs  Recent Labs   Lab 08/16/21  0533 08/15/21  0520 08/14/21 2055 08/14/21  1014 08/13/21  0554   ALKPHOS 113 121  --  110 122   AST 45 54*  --  54* 83*   ALT 22 25  --  27 32   BILITOTAL 1.8* 2.5*  --  1.9* 2.0*   PROTTOTAL 6.8 6.9 7.0 6.9 6.9   ALBUMIN 1.4* 1.5*  --  1.5* 1.6*      PANCNo lab results found in last 7 days.    IMAGING:  CT-CAP: 8/13/21  IMPRESSION:   1. Sequela of necrotizing pancreatitis with cystogastrostomy,  percutaneous drain, and multiple peripancreatic fluid collections, of  which the largest is slightly decreased.  2. There is a new 4 cm area of wedge-shaped hypoattenuation in hepatic  segment 2, which is concerning for possible abscess or altered  perfusion/infarct.  3. Grossly stable large right pleural effusion.  4. Slight increase in small volume ascites and stable loculated right  paracolic gutter fluid  collection.  5. Continued poor renal enhancement, suggesting acute renal injury.  6. Splenomegaly    ENDOSCOPY:  Last 8/11/21, see report. None in interim.

## 2021-08-16 NOTE — PROGRESS NOTES
[facility]  Progress Note - [place] 3 Service        Date of Admission:  6/28/2021    Assessment & Plan   Dax Villareal is a 30 year old male with hx of alcohol use disorder admitted on 6/28/2021 after recent prolonged admission at [facility] in [place] who has severe acute alcoholic hepatitis c/b HE, ESRD requiring HD, and malnutrition in the setting of acute necrotizing pancreatitis, s/p PEG-J placement on 7/13. He arrived with acute respiratory failure and septic shock that have resolved, and his secondary bacterial peritonitis has been appropriately treated. He requires continued management for ESRD, hepatic encephalopathy, infected pancreatic pseudocyst, nutritional support via PEG-J, and persistent right pleural effusion. Clinically improving after multiple necrosectomies.     Today:  - Patient declined para/thora. Given continued antibiotics for hepatic abscess per IR, low concern for new infection. Will repeat para/thora for symptoms of distension/dyspnea  - Patient did not have RP drain capped. Recapped today and will monitor for tolerance with plan for repeat imaging on 8/23. Managed by IR    # Necrotizing alcoholic pancreatitis w/ infected pseudocyst s/p RP Drain and Cystogastrostomy  # Septic Shock, resolved   # Leukocytosis    Admitted to the [facility] ICU on 6/28/2021 from [facility] in [place] for septic shock due to necrotizing pancreatitis. CT on admit showing mature collection with enhancing wall measuring ~[id number]. IR placed perc drain 6/29. Repeat imaging 7/18 revealed walled off necrotic collection amenable to endoscopic transluminal drainage with cystgastrostomy stenting with necrosectomy on 7/21/21.  7/28 - EGD, stent replacement (transluminal Axios with coaxial Solus), PEG-J removal (buried bumper) and replacement of new PEG-J with one new T-tag. Repeat necrosectomy and Axios removal/replacement Friday 8/6. Repeat CT on 8/9. 8/11 - EGD with  completion necrosectomy, no e/o bowel fistula, no plans for further necrosectomy unless further pain/fevers warrants further evaluation. RP drain capped with plans for reimaging on 8/23/2021     - Continue Cefepime 1 gm Q 24 + flagyl, previously recommended to continue through final debridement, will reevaluate as pt progresses clinically  - Daily CBC, watching fever curve  - GI following, appreciate recs              - Pancreatic enzymes   - Flush drain with 100ml q4hr per GI   - Blood cultures and fluid cultures NGTD    # Secondary Bacterial Peritonitis 2/2 infected pseudocyst  # Peripancreatic fluid collection growing VRE   7/2 - Diagnostic paracentesis (WBC 2663 - 74% PMN, SAAG <1.1, protein 4.1)     Antibiotics:   Current: Cefepime 1 gm Q 24 + flagyl 500 q8 (8/6-present)     Past:  - Linezolid 7/21-7/30  - Vancomcyin 6/29  - Meropenem 6/29-7/18  - Micafungin 6/29-7/1  - Cipro 7/19-7/21  - Ceftriaxone 7/21-7/24; 7/26-7/27  - Zosyn 7/27-8/6    - Paracentesis PRN      # Right hepatic/pancreatic hydrothorax  Intermittently increased subjective shortness of breath. CXR on 7/19 w/ increased RLL haziness; ddx includes pleural effusion v pneumonia v atelectasis. CT on 7/28 showed large volume R hepatic hydrothorax with complete atelectasis of right lung, bedside thora completed 7/28 with labs and cultures.     CXR 7/30 showed moderate R pleural effusion, unchanged from previous and improved aeration of right lung with small remaining pleural effusion on CT. Thora cultures negative. Repeat Thora on 8/9 showing exudative fluid     - IR completed thoracentesis 7/28 - exudative  - ID recommended continued empiric abx, though pleural effusion most likely 2/2 hepatic hydrothorax and pancreatitis. Defer plan for repeat thoracentesis based on symptoms and oxygen requirement. No plan for chest tube given concern for infectious nidus  - Supplemental oxygen as needed to keep sats above 89%  - Repeat Thoracentesis PRN for  "dyspnea    #Hepatic Abscess  CT Ab/pelvis from 8/13 with developing hepatic abscess vs. infarct. IR consulted. No intervention for abscesses <5 cm and appears more likely infarct rather than abscess per IR. Risk of procedural intervention outweighs benefit at this point.   - Will reassess with future imaging on 823.  - Blood cultures from 8/13 NGTD  - Para & thora differed per GI  - Continuing current antibiotics     #Hematochezia, resolved  Patient CTA-Chest on 8/8 with \"possible\" non-obstructive PE in [facility] Pulmonary Artery Branch. Patient has largely been bed bound and not on DVT prophylaxis for several days, especially in setting of recent procedures. Troponin negative x2. BRBPR with heparin. Ultrasounds negative for DVT.  - CTA-Chest on 8/10 negative for PE, no indication for anticuagulation  - Hematology recommends starting prophylactic dose anticoagulation once stable x1 week, stepping up to unfractionated heparin, likely will need 3 months of anticoagulation given hypercoagulable state of necrotizing pancreatitis   - Heme to be contacted prior to discharge when ready to transition to orals    # Lactic acidosis  Multifactorial d/t decreased lactic clearance in the setting of acute hepatic failure, ESRD, and acute on chronic infection.  - Continue to trend and assess vitals     # Gastric Outlet Obstruction  High G tube output due to the cystogastric connection/fluid drainage noted on 8/5. Per GI, possibly related to gastric outlet from severe abdominal inflammation and expected. KUB negative for obstructive gas pattern. Ileus from narcotics considered, but output not consistent with tube feeds. Improved after cystogastrectomy  -Anti-emetics  -Will monitor and continue to discuss with GI  -Having fluid removal per nephrology due to HD    #Hypokalemia, resolved  High GI output state. Replete as needed with ESRD noted. Improved with decreased G-tube drainage after cystogastrectomy.    # Severe " malnutrition in setting of chronic illness  PEG-J placed 7/13. Patient intermittently refusing tube feeds d/t nausea/vomiting. Improving oral intake s/p necresectomies.  - Dietitian consult for new tube feeds, appreciate recs   - Continuous TF per dietician order with goal of 65 ml/hr              - Cont panc enzymes   - Will trial regular diet with level 0 liquids as below  - Regular Diet, thin liquids per SLP  - VFSS for 8/10 to evaluate for dysphagia - cleared for level 0 liquids though vomited following  - Continue multivitamins  - Pantoprazole 40 mg IV daily  - Zofran PRN    Malnutrition:    - Level of malnutrition: Severe   - Based on: moderate/severe subcutaneous fat loss, moderate/severe muscle loss     # Alcoholic hepatitis complicated by portal hypertension, ascites, and HE - improving   # Hx of Alcohol use disorder c/b withdrawal   # Anemia  # Coagulopathy   Patient with a history of marked alcohol use and alcoholic hepatitis in the past. Was given a course of steroids (1 week) during previous admission at OSH. Patient mental status has been stable, alert and oriented x4 after rifaxamin and aggressive lactulose. Not a liver transplant candidate.   - Lactulose 20g daily and PRN and rifaximin 550mg BID - as patient allows  - Daily folic acid and thiamine  - Delirium precautions  - Melatonin 6mg at bedtime  - Daily CBC   - Transfuse if hemoglobin < 7, platelet < 10 or < 30 with bleeding  - Continue to monitor for signs of bleeding      # Stage III RADHA on HD due to ATN  # ESRD due to ATN  # Hypervolemia causing acute respiratory failure, resolved  Nephrology note 7/21 indicates that patient has been on renal replacement therapy for two months and pt status has most likely transitioned from ARF to ESRD at this point.      > Hemodialysis as per renal reccs - TTS              - Midodrine 10 mg PRN before dialysis sessions     > Daily chemistry labs, strict I/O, and daily weights  - Aspiration  precautions  - Supplemental oxygen as needed to keep sats above 89%    # Anemia  Baseline hgb 17. Running between 6-8 in setting of chronic illness, new ESRD, frequent lab draws and procedures.  - Goal hgb >7.0, trend Hgb    # Intermittent lactic acidosis  Suspect due to poor lactate clearance due to ESRD and ESLD + ongoing inflammation/infection. No fluids given pt is on dialysis; however, did receive Albumin last night.    # Apathy  Patient is intermittently refusing cares due to wanting a 'break,' but also reaffirms that he wants everything done to prolong his life. Mood is depressed, apathetic at times. Patient has multiple stressors including his own medical conditions and family health issues.  - Continue to offer emotional support services such as psychiatry, palliative,   - He declined psychiatry/psychology visits     # Goals of Care  Poor prognosis with multiorgan failure and non-compliance with treatment team. Palliative care and care conference on 7/19. Patient not participatory. Prognosis is gaurded with long course to rehab  - Hepatology confirmed pt will not be a transplant candidate  - Daily phone updates to sister Noni ([phone])       Diet: Adult Formula Drip Feeding: Continuous Vital 1.5; Jejunostomy; Goal Rate: 60; mL/hr; Medication - Feeding Tube Flush Frequency: At least 15-30 mL water before and after medication administration and with tube clogging; Amount to Send (Nutrition us...  Calorie Counts  NPO for Medical/Clinical Reasons Except for: Meds    DVT Prophylaxis: Pneumatic Compression Devices  Rdz Catheter: Not present  Fluids: 10 mL NS TKO  Central Lines: PRESENT  PICC Triple Lumen 06/28/21 Left-Site Assessment: WDL  CVC Double Lumen Right-Site Assessment: WDL  Code Status: Full Code      Disposition Plan   Expected discharge: >7 days, recommended to transitional care unit once  nutrition improved, infected pancreatic pseudocyst is well managed and plan for dialysis is in  place.       Johny Ortiz MD  [phone]  [address]  Securely message with the [employer] Web Console (learn more here)  Text page via [facility] Paging/Directory  Please see sign in/sign out for up to date coverage information  _____________________________________________________________________    Interval History    Emesisx1, poor PO intake of <calories. Diarrhea resolved, no dyspnea, abdominal pain well-controlled, no urine output.    Physical Exam   Vital Signs: Temp: 98.1  F (36.7  C) Temp src: Oral BP: 128/62 Pulse: 119   Resp: 20 SpO2: 96 % O2 Device: Nasal cannula Oxygen Delivery: 4 LPM  Weight: 165 lbs 5.52 oz  Constitutional: Awake, non-diaphoretic, conversational regarding upcoming surgery plans  HEENT: Normocephalic. Atraumatic. No scleral icterus.  Cardiovascular: tachycardic, tender chest to palpation diffusely  Respiratory: Tachypnic, Decreased breath sounds on right, NC on for comfort  Abdomen: Tolerates palpation with minimal pain, PEG-tube site clean  Musculoskeletal: Emaciated, moving all extremities on examination  Skin: no Jaundice    Data   Recent Labs   Lab 08/16/21  0533 08/15/21  1103 08/15/21  0520 08/14/21  1104 08/14/21  1014 08/13/21  0554   WBC 13.1*  --  15.1* 14.8*  --   --    HGB 7.8*  --  7.6* 7.9*  --   --    MCV 94  --  92 91  --   --      --  286 277  --   --    INR  --  1.86*  --   --   --   --    *  --  133  --  128*  --    POTASSIUM 4.2  --  3.9  --  4.0  --    CHLORIDE 98  --  100  --  96  --    CO2 24  --  27  --  25  --    BUN 28  --  16  --  29  --    CR 2.32*  --  1.72*  --  2.49*  --    ANIONGAP 10  --  6  --  7  --    JANENE 8.1*  --  7.9*  --  7.8*  --    *  --  132*  --  138*  --    ALBUMIN 1.4*  --  1.5*  --  1.5*   < >   PROTTOTAL 6.8  --  6.9  --  6.9   < >   BILITOTAL 1.8*  --  2.5*  --  1.9*   < >   ALKPHOS 113  --  121  --  110   < >   ALT 22  --  25  --  27   < >   AST 45  --  54*  --  54*    < >    < > = values in this interval not displayed.

## 2021-08-16 NOTE — PLAN OF CARE
"Cares 1500 to 1900    /63 (BP Location: Right arm)   Pulse (!) 127   Temp 97  F (36.1  C) (Oral)   Resp 22   Ht 1.676 m (5' 6\")   Wt 78 kg (171 lb 15.3 oz)   SpO2 96%   BMI 27.75 kg/m      VSS ex tachycardia on 4L oxygen via nasal cannula. Alert & oriented, calls appropriately. Labored breathing w/accessory & abdominal muscle use, denied SOB at rest. Respiratory status unchanged, providers aware. Pt had 1 emesis of undigested food, compazine IV given with relief. Dilaudid PO given for generalized pain in back & abdomen. Tube feeds at goal 60 ml/hr via G tube, J tube to gravity drainage. Left 3x PICC SL. Left peritoneal drain irrigated per MAR. Pt refused full skin assessment, stating that he wanted to rest. Pt refused chux/linen change, pt willing to have linens changed \"when I use the bedpan\". Anuric, on dialysis.     Continue to monitor pt.   "

## 2021-08-16 NOTE — PROGRESS NOTES
IR follow-up note.    Pt with IR left RP drain. Drain was capped 8/12 at request of GI. Opened back up to drainage at some point since that time (?8/13) for unclear reason. Drain capped again today at the bedside. Flushes d/c'ed. Recommend clinical follow up and repeated imaging in 1 week to see if patient has passed capping trail. If patient develops leakage around the drain at the skin, fevers, chills, worsening pain recommend reconnecting to gravity and CT imaging. If he passes trial, may then move toward consideration of drain removal. Drainage bag has been left at the bedside and RN can open drain back up to external drainage if clinical concerns arise.    Discussed with Dr. Ortiz. IR will continue to follow.    Karen Fitzpatrick DNP, APRN  Interventional Radiology   IR on-call pager: [phone]

## 2021-08-16 NOTE — PROGRESS NOTES
Nephrology Progress Note  08/16/2021         Dax Villareal is a 30 year old male with h/o ETOH hepatitis, end stage liver disease, RADHA on HD, transferred from [facility] in [place] for septic shock on 6/28/21 for treatment of necrotizing pancreatitis and decompensated liver disease. He was initially admitted to [facility] 5/19/21.        Interval History :   Mr Villareal had necrosectomy for 4th time on 8/11, had CT 8/12 showing large pleural effusion on R side which is likely ascites fluid but he is refusing to have it tapped.  Despite this he has reasonable BP's and has been stable on TTS HD, plan for run tomorrow on schedule.  Is taking more PO and no longer has severe abdominal pain preventing working with rehab.        Assessment & Recommendations:   RADHA-Cr was 0.8 as recently as 5/19/2021 but now HD dependent, started RRT at [facility] prior to transfer to [facility] (exact date unclear but per family it was end of May).  Etiology likely multifactorial with contrast, sepsis/pancreatitis, likely HRS physiology contributing as well.  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  Continuing RRT as long as it is tolerated with regards to BP's, is functional enough to possibly do outpt HD.  MELD is in high 30's so has high 3 month mortality but continuing restorative cares for now, BP's have been stable enough to support HD.  Had GI stenting of pancreas x4.                 -Line is TDC from PTA               - HD on TTS schedule unless issues arise.      Volume status-Making small amounts of UOP, has large pleural effusion by CT which he is refusing to have tapped and IVC is dilated on ECHO although this is likely worsened due to effusion.                -likely needs another thoracentesis, refusing.      Electrolytes/pH-K 4.2, bicarb 24, no acute issues.       Ca/phos/pth-Ca 8.1, corrects to normal with low albumin.  Mg and Phos reasonable.       Anemia-Hgb low at 7.8, acute management per team, last PRBC's  "7/14.  Started on EPO 5k with runs, iron sats 43%      Nutrition-Taking PO, appetite reasonable.       Seen and discussed with Dr Brown      Recommendations were communicated to primary team via note          SOFIA Marlow CNS  Clinical Nurse Specialist  [phone]    Review of Systems:   I reviewed the following systems:  Gen: No fevers or chills  CV: No CP at rest  Resp: No SOB at rest  GI: No N/V    Physical Exam:   I/O last 3 completed shifts:  In: 2180 [P.O.:60; I.V.:20; Other:600; NG/GT:180]  Out: 1885 [Drains:1885]   /62 (BP Location: Right arm)   Pulse 119   Temp 98.1  F (36.7  C) (Oral)   Resp 20   Ht 1.676 m (5' 6\")   Wt 75 kg (165 lb 5.5 oz)   SpO2 96%   BMI 26.69 kg/m       GENERAL APPEARANCE: Lying in bed, RR shallow and he is coughing frequently  EYES:  scleral icterus, pupils equal  Pulmonary: decreased BS   CV: tachy   - Edema - no LE edema  GI: mild distention, non-tender. Has pancreatic drain  MS: no evidence of inflammation in joints, no muscle tenderness  SKIN: no rash, warm, dry, no cyanosis  NEURO: alert/interactive  ACCESS: Right TDC    Labs:   All labs reviewed by me  Electrolytes/Renal - Recent Labs   Lab Test 08/16/21  0533 08/15/21  0520 08/14/21  1014   * 133 128*   POTASSIUM 4.2 3.9 4.0   CHLORIDE 98 100 96   CO2 24 27 25   BUN 28 16 29   CR 2.32* 1.72* 2.49*   * 132* 138*   JANENE 8.1* 7.9* 7.8*   MAG 1.6 1.6 1.8   PHOS 4.1 2.5 4.2       CBC -   Recent Labs   Lab Test 08/16/21  0533 08/15/21  0520 08/14/21  1104   WBC 13.1* 15.1* 14.8*   HGB 7.8* 7.6* 7.9*    286 277       LFTs -   Recent Labs   Lab Test 08/16/21  0533 08/15/21  0520 08/14/21 2055 08/14/21  1014   ALKPHOS 113 121  --  110   BILITOTAL 1.8* 2.5*  --  1.9*   ALT 22 25  --  27   AST 45 54*  --  54*   PROTTOTAL 6.8 6.9 7.0 6.9   ALBUMIN 1.4* 1.5*  --  1.5*       Iron Panel -   Recent Labs   Lab Test 08/14/21 2055 07/19/21  0632   IRON 28* 31*   IRONSAT 21 43   JERRELL 2,186*  --  "           Current Medications:    sodium bicarbonate  325 mg Per Feeding Tube Q4H    And     amylase-lipase-protease  1-2 capsule Per Feeding Tube Q4H     amylase-lipase-protease  2 capsule Oral TID w/meals     B and C vitamin Complex with folic acid  5 mL Per Feeding Tube Daily     banatrol plus  1 packet Per Feeding Tube TID     ceFEPIme (MAXIPIME) IV  1 g Intravenous Q24H     folic acid  1 mg Oral Daily     [Held by provider] heparin ANTICOAGULANT  5,000 Units Subcutaneous Q12H     HYDROmorphone (STANDARD CONC)  2 mg Oral Q8H     melatonin  6 mg Oral At Bedtime     menthol   Transdermal Q8H     metroNIDAZOLE  500 mg Intravenous Q8H     sodium chloride (PF)  100 mL Irrigation Q4H LENKA     sodium chloride (PF)  3 mL Intracatheter Q8H     sodium chloride (PF)  73 mL Intravenous Once     vitamin B1  100 mg Oral Daily    Or     thiamine  100 mg Intravenous Daily       - MEDICATION INSTRUCTIONS -       dextrose

## 2021-08-17 LAB
ALBUMIN SERPL-MCNC: 1.4 G/DL (ref 3.4–5)
ALP SERPL-CCNC: 106 U/L (ref 40–150)
ALT SERPL W P-5'-P-CCNC: 22 U/L (ref 0–70)
ANION GAP SERPL CALCULATED.3IONS-SCNC: 9 MMOL/L (ref 3–14)
AST SERPL W P-5'-P-CCNC: 40 U/L (ref 0–45)
BILIRUB SERPL-MCNC: 1.8 MG/DL (ref 0.2–1.3)
BUN SERPL-MCNC: 39 MG/DL (ref 7–30)
CALCIUM SERPL-MCNC: 8.1 MG/DL (ref 8.5–10.1)
CHLORIDE BLD-SCNC: 97 MMOL/L (ref 94–109)
CO2 SERPL-SCNC: 24 MMOL/L (ref 20–32)
CREAT SERPL-MCNC: 2.82 MG/DL (ref 0.66–1.25)
ERYTHROCYTE [DISTWIDTH] IN BLOOD BY AUTOMATED COUNT: 17.4 % (ref 10–15)
GFR SERPL CREATININE-BSD FRML MDRD: 29 ML/MIN/1.73M2
GLUCOSE BLD-MCNC: 151 MG/DL (ref 70–99)
HCT VFR BLD AUTO: 23.7 % (ref 40–53)
HGB BLD-MCNC: 7.7 G/DL (ref 13.3–17.7)
LACTATE SERPL-SCNC: 1.3 MMOL/L (ref 0.7–2)
MAGNESIUM SERPL-MCNC: 1.8 MG/DL (ref 1.6–2.3)
MCH RBC QN AUTO: 29.8 PG (ref 26.5–33)
MCHC RBC AUTO-ENTMCNC: 32.5 G/DL (ref 31.5–36.5)
MCV RBC AUTO: 92 FL (ref 78–100)
PHOSPHATE SERPL-MCNC: 5.2 MG/DL (ref 2.5–4.5)
PLATELET # BLD AUTO: 317 10E3/UL (ref 150–450)
POTASSIUM BLD-SCNC: 4.4 MMOL/L (ref 3.4–5.3)
PROT SERPL-MCNC: 6.6 G/DL (ref 6.8–8.8)
RBC # BLD AUTO: 2.58 10E6/UL (ref 4.4–5.9)
SODIUM SERPL-SCNC: 130 MMOL/L (ref 133–144)
WBC # BLD AUTO: 13.6 10E3/UL (ref 4–11)

## 2021-08-17 PROCEDURE — 90937 HEMODIALYSIS REPEATED EVAL: CPT

## 2021-08-17 PROCEDURE — 85027 COMPLETE CBC AUTOMATED: CPT | Performed by: INTERNAL MEDICINE

## 2021-08-17 PROCEDURE — 250N000013 HC RX MED GY IP 250 OP 250 PS 637: Performed by: INTERNAL MEDICINE

## 2021-08-17 PROCEDURE — 250N000013 HC RX MED GY IP 250 OP 250 PS 637: Performed by: STUDENT IN AN ORGANIZED HEALTH CARE EDUCATION/TRAINING PROGRAM

## 2021-08-17 PROCEDURE — 80053 COMPREHEN METABOLIC PANEL: CPT | Performed by: INTERNAL MEDICINE

## 2021-08-17 PROCEDURE — 99233 SBSQ HOSP IP/OBS HIGH 50: CPT | Mod: GC | Performed by: STUDENT IN AN ORGANIZED HEALTH CARE EDUCATION/TRAINING PROGRAM

## 2021-08-17 PROCEDURE — 83605 ASSAY OF LACTIC ACID: CPT | Performed by: STUDENT IN AN ORGANIZED HEALTH CARE EDUCATION/TRAINING PROGRAM

## 2021-08-17 PROCEDURE — 258N000003 HC RX IP 258 OP 636: Performed by: CLINICAL NURSE SPECIALIST

## 2021-08-17 PROCEDURE — 36592 COLLECT BLOOD FROM PICC: CPT | Performed by: STUDENT IN AN ORGANIZED HEALTH CARE EDUCATION/TRAINING PROGRAM

## 2021-08-17 PROCEDURE — 83735 ASSAY OF MAGNESIUM: CPT | Performed by: INTERNAL MEDICINE

## 2021-08-17 PROCEDURE — 36592 COLLECT BLOOD FROM PICC: CPT | Performed by: INTERNAL MEDICINE

## 2021-08-17 PROCEDURE — 250N000011 HC RX IP 250 OP 636: Performed by: STUDENT IN AN ORGANIZED HEALTH CARE EDUCATION/TRAINING PROGRAM

## 2021-08-17 PROCEDURE — 634N000001 HC RX 634: Performed by: CLINICAL NURSE SPECIALIST

## 2021-08-17 PROCEDURE — 84100 ASSAY OF PHOSPHORUS: CPT | Performed by: INTERNAL MEDICINE

## 2021-08-17 PROCEDURE — 120N000002 HC R&B MED SURG/OB UMMC

## 2021-08-17 PROCEDURE — 99233 SBSQ HOSP IP/OBS HIGH 50: CPT | Performed by: PHYSICIAN ASSISTANT

## 2021-08-17 RX ADMIN — Medication 5 ML: at 12:29

## 2021-08-17 RX ADMIN — METRONIDAZOLE 500 MG: 500 INJECTION, SOLUTION INTRAVENOUS at 19:28

## 2021-08-17 RX ADMIN — MELATONIN TAB 3 MG 6 MG: 3 TAB at 21:56

## 2021-08-17 RX ADMIN — SODIUM BICARBONATE 325 MG: 325 TABLET ORAL at 00:56

## 2021-08-17 RX ADMIN — METRONIDAZOLE 500 MG: 500 INJECTION, SOLUTION INTRAVENOUS at 04:52

## 2021-08-17 RX ADMIN — PANCRELIPASE 2 CAPSULE: 24000; 76000; 120000 CAPSULE, DELAYED RELEASE PELLETS ORAL at 16:52

## 2021-08-17 RX ADMIN — Medication 1 PACKET: at 16:52

## 2021-08-17 RX ADMIN — CEFEPIME HYDROCHLORIDE 1 G: 1 INJECTION, POWDER, FOR SOLUTION INTRAMUSCULAR; INTRAVENOUS at 20:46

## 2021-08-17 RX ADMIN — HYDROMORPHONE HYDROCHLORIDE 2 MG: 1 SOLUTION ORAL at 00:55

## 2021-08-17 RX ADMIN — PANCRELIPASE 2 CAPSULE: 24000; 76000; 120000 CAPSULE, DELAYED RELEASE PELLETS ORAL at 12:29

## 2021-08-17 RX ADMIN — Medication: at 08:30

## 2021-08-17 RX ADMIN — Medication 1 PACKET: at 19:38

## 2021-08-17 RX ADMIN — HYDROMORPHONE HYDROCHLORIDE 1 MG: 1 SOLUTION ORAL at 19:37

## 2021-08-17 RX ADMIN — SODIUM BICARBONATE 325 MG: 325 TABLET ORAL at 04:52

## 2021-08-17 RX ADMIN — ACETAMINOPHEN 325 MG: 325 SOLUTION ORAL at 21:56

## 2021-08-17 RX ADMIN — SODIUM BICARBONATE 325 MG: 325 TABLET ORAL at 07:55

## 2021-08-17 RX ADMIN — SODIUM BICARBONATE 325 MG: 325 TABLET ORAL at 12:29

## 2021-08-17 RX ADMIN — SODIUM BICARBONATE 325 MG: 325 TABLET ORAL at 21:04

## 2021-08-17 RX ADMIN — PANCRELIPASE 2 CAPSULE: 24000; 76000; 120000 CAPSULE, DELAYED RELEASE PELLETS ORAL at 21:05

## 2021-08-17 RX ADMIN — PANCRELIPASE 2 CAPSULE: 24000; 76000; 120000 CAPSULE, DELAYED RELEASE PELLETS ORAL at 04:52

## 2021-08-17 RX ADMIN — THIAMINE HCL TAB 100 MG 100 MG: 100 TAB at 12:29

## 2021-08-17 RX ADMIN — SODIUM CHLORIDE 250 ML: 9 INJECTION, SOLUTION INTRAVENOUS at 08:30

## 2021-08-17 RX ADMIN — HYDROMORPHONE HYDROCHLORIDE 1 MG: 1 SOLUTION ORAL at 12:29

## 2021-08-17 RX ADMIN — PANCRELIPASE 2 CAPSULE: 24000; 76000; 120000 CAPSULE, DELAYED RELEASE PELLETS ORAL at 07:55

## 2021-08-17 RX ADMIN — EPOETIN ALFA-EPBX 5000 UNITS: 10000 INJECTION, SOLUTION INTRAVENOUS; SUBCUTANEOUS at 10:53

## 2021-08-17 RX ADMIN — SODIUM BICARBONATE 325 MG: 325 TABLET ORAL at 16:53

## 2021-08-17 RX ADMIN — FOLIC ACID 1 MG: 1 TABLET ORAL at 12:29

## 2021-08-17 RX ADMIN — SODIUM CHLORIDE 300 ML: 9 INJECTION, SOLUTION INTRAVENOUS at 08:30

## 2021-08-17 RX ADMIN — HYDROMORPHONE HYDROCHLORIDE 1 MG: 1 SOLUTION ORAL at 06:12

## 2021-08-17 RX ADMIN — METRONIDAZOLE 500 MG: 500 INJECTION, SOLUTION INTRAVENOUS at 12:29

## 2021-08-17 RX ADMIN — PANCRELIPASE 2 CAPSULE: 24000; 76000; 120000 CAPSULE, DELAYED RELEASE PELLETS ORAL at 00:55

## 2021-08-17 RX ADMIN — HYDROMORPHONE HYDROCHLORIDE 2 MG: 1 SOLUTION ORAL at 16:53

## 2021-08-17 ASSESSMENT — ACTIVITIES OF DAILY LIVING (ADL)
ADLS_ACUITY_SCORE: 18
ADLS_ACUITY_SCORE: 18
ADLS_ACUITY_SCORE: 20
ADLS_ACUITY_SCORE: 18
ADLS_ACUITY_SCORE: 18

## 2021-08-17 ASSESSMENT — MIFFLIN-ST. JEOR: SCORE: 1672.75

## 2021-08-17 NOTE — PROGRESS NOTES
HEMODIALYSIS TREATMENT NOTE    Date: 8/17/2021  Time: 11:06 AM    Data:  Pre Wt: 78.5 kg (173 lb 1 oz)   Desired Wt: 77.5 kg   Post Wt: 77.5 kg (170 lb 13.7 oz)  Weight change: 1 kg  Ultrafiltration - Post Run Net Total Removed (mL): 1000 mL  Vascular Access Status: CVC  patent  Dialyzer Rinse: Clear  Total Blood Volume Processed: 55.41 L   Total Dialysis (Treatment) Time: 3   Dialysate Bath: K 3, Ca 3  Heparin: None    Lab:   No    Interventions:  Pt had a stable uncomplicated 3 hours of HD. 1L of fluid was pulled per order. Had a Lg loose BM X 3, and was cleansed. Initially c/o generalized body pains, Dilaudid not in pixys at the time, at the time it came, pt denied pain. Placed on 5L of 02 per his request for SOB. Epogen administered as ordered. Pt repositioned X 5 per his request. Ending BP was 124/85. Pt rinsed back post tx, lumen was saline locked, caps changed, and hand off report given to ROB Grijalva.    Assessment:  -Restless and constantly needing repositioning  -VSS  -A & O X 4     Plan:    Per renal

## 2021-08-17 NOTE — PROGRESS NOTES
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 Transferred to [facility] for consideration of drainage of presumed infected necrotic collection. S/p EUS-guided necrosectomy (7/21/21).     #. Liver abscess vs infarct  Seen on CT (8/13/21), agree w/ IR consult, size likely too small to drain. Blood cultures negative x 2. Would recommend treatment with antibiotics.     #. Acute necrotizing pancreatitis with presumed infected walled off necrotic collection s/p percutaneous and endoscopic drainage   #. Septic shock, resolved  #. Leukocytosis  #. Ascites/Peritonitis  Unclear evolution of pancreatitis history but had a recent CT scan with large evolving necrotic collection, presumed infected. No obvious gas in collection. CT on admission to [facility] showing mature collection with enhancing wall measuring ~[id number]. With profound coagulopathy (liver disease + malnutrition +sepsis), INR 3.8 on transfer he was given FFP and Vit K, IR placed perc drain 6/29. Repeat imaging 7/18 with still large walled off necrotic collection that appeared amenable to endoscopic transluminal drainage, underwent cystgastrostomy stenting on 7/21/21, extensive debridement/necrosectomy 8/6, final near complete/suffiecient debridement with evacuation of solid necrosis in right side of cavity 8/11,two 10 x 1 cm Solus stents placed across cavity, no current plans for future necrosectomies.  Etiology: alcohol  Date of onset: 5/19  BISAP score on admission: unclear  Concurrent organ failure: respiratory (now extubated), renal (on HD), liver, cardiogenic (weaned off pressors  6/29)  Thromboses: none  Diabetes: no  Current nutrition access: PEG-J + po diet  Last imaging: CT scan with IV contrast 8/13  Infection/antiinfectives:                 - Meropenem (6/29 - 7/18), Ciprofloxacin (7/19 - 7/21), Vancomycin (6/29 - 6/30), Micafungin (6/29 - 7/1), Ceftriaxone (7/21 - 7/23), Linezolid (7/21 - 7/30), Zosyn (7/27-8/6), Cefepime (8/6 - present), Metronidazole (8/6 - present)    Intervention:                 6/30 - Left IR RP perc drain (24F)                 7/2 - Diagnostic paracentesis (WBC 2663 - 74% PMN, SAAG <1.1, protein 4.1)                  7/5 - Repeat Paracentesis (WBC 1801 - 61% PMN, lipase 48)                 7/13 - Diag/therapeutic paracentesis ()                 7/13 - PEG-J placement with T-tag gastropexy                 7/21 - Repeat paracentesis                 7/21 - EUS-cystgastrostomy and necrosectomy, Axios with co-axial Viabil                 7/28 - EGD, stent replacement (transluminal Axios with coaxial Solus), PEG-J removal (buried bumper) and replacement of new PEG-J with one new T-tag                 8/6 - EGD with necrosectomy and near complete necrosectomy                 8/11 - EGD with completion necrosectomy, no e/o bowel fistula     -- no further necrosectomies anticipated  -- continue to monitor drain site after capping (anticipate drain removal after repeat CT scan next week per IR, see their note 8/16 Karen Fitzpatrick NP)  -- PRN paracentesis  -- continue antibiotics until drain removed  -- pain meds per primary team  -- from GI perspective, we are comfortable with discharge to TCU vs rehab center       #. Severe malnutrition in the context of chronic illness  #. Gastric outlet obstruction s/p PEG-J placement  #. Mild to moderate EPI  #. Concern for bowel fistula  Patient with very poor oral intake over the course of his illness and was not meeting calorie requirements orally. S/p PEG-J with T-tag gastropexy (x3) 7/13. Overall improved. Had one day of  "very large output from perc drain that appeared to look like tube feeds -- no e/o bowel fistula on fluroscopy.   -- g tube to gravity - could potentially trial capping this as well  -- diet as tolerated  -- continue tube feeds with PERT       #. Hematochezia, resolved  Two episodes of BRBPR 8/8-8/9. Likely related to oozing after necrosectomy in the setting of new anticoagulation for questionable PE (CT Chest 8/8 with concern for PE). Hgb stable. Bleeding stopped after anticoagulation was stopped. Would continue to hold anticoagulation for now. Hemodynamics unchanged. CTA 8/10 without PE  -- follow H/H    #. Alcohol hepatitis  #. Hyperbilirubinemia  #. Coagulopathy  #. Encephalopathy, improved  Known heavy alcohol use. Reportedly received course of steroids at OSH for 1 week with \"some improvement\". Currently has primarily hyperbilirubinemia and mildly elevated AST. Unclear if he has underlying cirrhosis but certainly would fit with alcohol hepatitis vs elevated liver tests of critical illness/sepsis. Not candidate for steroids right now. He is receiving lactulose and Xifaxin for encephalopathy. Hepatology notified of admission and they have weighed in on liver transplant status 7/22 (Makayla Glass APRN CNP)  -- trend MELD labs  -- alcohol abstinence     Panc/bili GI team will continue to follow with you.      The patient was seen and discussed and plan agreed upon with GI staff, Dr. Ramos Norwood PA-C  Advanced Endoscopy/Pancreaticobiliary GI Service  [facility]  Text Page  _______________________________________________________________  S: 24hr events and RN notes reviewed. Patient seen and evaluated at 1300. Reports no new symptoms. Denies abdominal pain. Perc drain capped yesterday (again). Urged him to have thoracentesis as recommended by primary team, patient says he is \"too lazy\" to do it today.    O:  Blood pressure 106/56, pulse (!) 125, temperature 97.5  F (36.4 " " C), temperature source Oral, resp. rate 20, height 1.676 m (5' 6\"), weight 77.5 kg (170 lb 13.7 oz), SpO2 94 %.    Gen: Laying in bed. Appears comfortable  HEENT: NCAT. Conjunctiva clear. Sclera + mild icteric, poor dentition  Resp: non labored breathing  Abd: mild distention, soft, NT, GJ tube with tube feeds through J and minimal bilious output in G tube, flank drain capped and site covered in bandage  Skin: + mild jaundice  Ext: warm, well perfused  Mental status/Psych: alert and oriented, pleasant      LABS:  BMP  Recent Labs   Lab 08/17/21  0730 08/16/21  0533 08/15/21  0520 08/14/21  1014   * 132* 133 128*   POTASSIUM 4.4 4.2 3.9 4.0   CHLORIDE 97 98 100 96   JANENE 8.1* 8.1* 7.9* 7.8*   CO2 24 24 27 25   BUN 39* 28 16 29   CR 2.82* 2.32* 1.72* 2.49*   * 136* 132* 138*     CBC  Recent Labs   Lab 08/17/21  0730 08/16/21  0533 08/15/21  0520 08/14/21  1104   WBC 13.6* 13.1* 15.1* 14.8*   RBC 2.58* 2.63* 2.59* 2.74*   HGB 7.7* 7.8* 7.6* 7.9*   HCT 23.7* 24.8* 23.9* 24.8*   MCV 92 94 92 91   MCH 29.8 29.7 29.3 28.8   MCHC 32.5 31.5 31.8 31.9   RDW 17.4* 17.7* 17.3* 16.8*    289 286 277     INR  Recent Labs   Lab 08/15/21  1103   INR 1.86*     LFTs  Recent Labs   Lab 08/17/21  0730 08/16/21  0533 08/15/21  0520 08/14/21  2055 08/14/21  1014   ALKPHOS 106 113 121  --  110   AST 40 45 54*  --  54*   ALT 22 22 25  --  27   BILITOTAL 1.8* 1.8* 2.5*  --  1.9*   PROTTOTAL 6.6* 6.8 6.9 7.0 6.9   ALBUMIN 1.4* 1.4* 1.5*  --  1.5*      PANCNo lab results found in last 7 days.    IMAGING:  CT-CAP: 8/13/21  IMPRESSION:   1. Sequela of necrotizing pancreatitis with cystogastrostomy,  percutaneous drain, and multiple peripancreatic fluid collections, of  which the largest is slightly decreased.  2. There is a new 4 cm area of wedge-shaped hypoattenuation in hepatic  segment 2, which is concerning for possible abscess or altered  perfusion/infarct.  3. Grossly stable large right pleural effusion.  4. Slight " increase in small volume ascites and stable loculated right  paracolic gutter fluid collection.  5. Continued poor renal enhancement, suggesting acute renal injury.  6. Splenomegaly    ENDOSCOPY:  Last 8/11/21, see report. None in interim.

## 2021-08-17 NOTE — PROGRESS NOTES
Nephrology Progress Note  08/17/2021       Dax Villareal is a 30 year old male with h/o ETOH hepatitis, end stage liver disease, RADHA on HD, transferred from [facility] in [place] for septic shock on 6/28/21 for treatment of necrotizing pancreatitis and decompensated liver disease. He was initially admitted to [facility] 5/19/21.        Interval History :   Mr Villareal had necrosectomy for 4th time on 8/11, had CT 8/12 showing large pleural effusion on R side which is likely ascites fluid but he is refusing to have it tapped.  Some SOB on our exam today, tachycardic but normotensive.  With drain output >intake yesterday we are doing minimal UF, most of his fluid is likely pleural.        Assessment & Recommendations:   RADHA-Cr was 0.8 as recently as 5/19/2021 but now HD dependent, started RRT at [facility] prior to transfer to [facility] (exact date unclear but per family it was end of May).  Etiology likely multifactorial with contrast, sepsis/pancreatitis, likely HRS physiology contributing as well.  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  Continuing RRT as long as it is tolerated with regards to BP's, is functional enough to possibly do outpt HD.  MELD is in high 30's so has high 3 month mortality but continuing restorative cares for now, BP's have been stable enough to support HD.  Had GI stenting of pancreas x4.                 -Line is TDC from PTA               - HD on TTS schedule unless issues arise.      Volume status-Making small amounts of UOP, has large pleural effusion by CT which he is refusing to have tapped and IVC is dilated on ECHO although this is likely worsened due to effusion.                -likely needs another thoracentesis, refusing.      Electrolytes/pH-K 4.4, bicarb 24, no acute issues.       Ca/phos/pth-Ca 8.1, corrects to normal with low albumin.  Mg and Phos reasonable.       Anemia-Hgb low at 7.7, acute management per team, last PRBC's 7/14.  Started on EPO 5k with runs, iron sats  "43%      Nutrition-Taking PO, appetite reasonable.       Seen and discussed with Dr Brown      Recommendations were communicated to primary team via note          SOFIA Marlow CNS  Clinical Nurse Specialist  [phone]    Review of Systems:   I reviewed the following systems:  Gen: No fevers or chills  CV: No CP at rest  Resp: No SOB at rest  GI: No N/V      Physical Exam:   I/O last 3 completed shifts:  In: 780 [I.V.:20; Other:100]  Out: 1500 [Urine:150; Drains:1150; Stool:200]   /80   Pulse (!) 126   Temp 98.7  F (37.1  C) (Oral)   Resp 19   Ht 1.676 m (5' 6\")   Wt 77.5 kg (170 lb 13.7 oz)   SpO2 96%   BMI 27.58 kg/m       GENERAL APPEARANCE: Lying in bed, RR shallow and he is coughing frequently  EYES:  scleral icterus, pupils equal  Pulmonary: decreased BS   CV: tachy   - Edema - no LE edema  GI: mild distention, non-tender. Has pancreatic drain  MS: no evidence of inflammation in joints, no muscle tenderness  SKIN: no rash, warm, dry, no cyanosis  NEURO: alert/interactive  ACCESS: Right TDC    Labs:   All labs reviewed by me  Electrolytes/Renal - Recent Labs   Lab Test 08/17/21  0730 08/16/21  0533 08/15/21  0520   * 132* 133   POTASSIUM 4.4 4.2 3.9   CHLORIDE 97 98 100   CO2 24 24 27   BUN 39* 28 16   CR 2.82* 2.32* 1.72*   * 136* 132*   JANENE 8.1* 8.1* 7.9*   MAG 1.8 1.6 1.6   PHOS 5.2* 4.1 2.5       CBC -   Recent Labs   Lab Test 08/17/21  0730 08/16/21  0533 08/15/21  0520   WBC 13.6* 13.1* 15.1*   HGB 7.7* 7.8* 7.6*    289 286       LFTs -   Recent Labs   Lab Test 08/17/21  0730 08/16/21  0533 08/15/21  0520   ALKPHOS 106 113 121   BILITOTAL 1.8* 1.8* 2.5*   ALT 22 22 25   AST 40 45 54*   PROTTOTAL 6.6* 6.8 6.9   ALBUMIN 1.4* 1.4* 1.5*       Iron Panel -   Recent Labs   Lab Test 08/14/21 2055 07/19/21  0632   IRON 28* 31*   IRONSAT 21 43   JERRELL 2,186*  --            Current Medications:    sodium bicarbonate  325 mg Per Feeding Tube Q4H    And     " amylase-lipase-protease  1-2 capsule Per Feeding Tube Q4H     amylase-lipase-protease  2 capsule Oral TID w/meals     B and C vitamin Complex with folic acid  5 mL Per Feeding Tube Daily     banatrol plus  1 packet Per Feeding Tube TID     ceFEPIme (MAXIPIME) IV  1 g Intravenous Q24H     folic acid  1 mg Oral Daily     [Held by provider] heparin ANTICOAGULANT  5,000 Units Subcutaneous Q12H     HYDROmorphone (STANDARD CONC)  2 mg Oral Q8H     melatonin  6 mg Oral At Bedtime     menthol   Transdermal Q8H     metroNIDAZOLE  500 mg Intravenous Q8H     sodium chloride (PF)  3 mL Intracatheter Q8H     sodium chloride (PF)  73 mL Intravenous Once     sodium chloride (PF)  9 mL Intracatheter During Dialysis/CRRT (from stock)     sodium chloride (PF)  9 mL Intracatheter During Dialysis/CRRT (from stock)     vitamin B1  100 mg Oral Daily    Or     thiamine  100 mg Intravenous Daily       - MEDICATION INSTRUCTIONS -       dextrose

## 2021-08-17 NOTE — PLAN OF CARE
"Assumed cares 2300 - 0700    /70 (BP Location: Right arm)   Pulse (!) 123   Temp (!) 95.3  F (35.2  C) (Axillary)   Resp 20   Ht 1.676 m (5' 6\")   Wt 78.5 kg (173 lb 1 oz)   SpO2 94%   BMI 27.93 kg/m      A&Ox4. VSS on 4 L NC ex tachycardic. Reported generalized pain controlled with scheduled and PRN dilaudid. Dyspnea on exertion, uses bedside yonker for oral secretions. Refuses to keep pulse ox on. Denied N/V. J tube TF @ goal rate of 60 ml/hr. G tube to gravity. NPO. L peritoneal drain clamped. Voiding adequately with bedside urinal, tea colored urine. x1 loose BM. Intermittently refuses repositioning and changing of linens. L PICC TKO for intermittent ABX and saline locked. Plan for dialysis at 0800 this morning.   "

## 2021-08-17 NOTE — PLAN OF CARE
"Cares 0700 to 1900    /53 (BP Location: Right arm)   Pulse (!) 125   Temp 98.2  F (36.8  C) (Oral)   Resp 20   Ht 1.676 m (5' 6\")   Wt 75 kg (165 lb 5.5 oz)   SpO2 96%   BMI 26.69 kg/m      VSS ex tachycardia. Labored breathing on 4L NC. Pt gets SOB with eating/drinking/doing cares. Pt wants O2 increased upto 6L with activity though sats remained in high 90s at 4L. Pt unable to tolerate HOB > 30 degrees.   Pt on NPO due to frequent coughing & aspirating after oral meds/ice chips/drinking. Emesis 1x. Pt has thick productive secretions frequently, yankuer within reach. Tube feeds through J tube at goal rate 60 ml/hr. Refused PEG tube dressing change. No drainage in G tube bag. Left peritoneal dressing changed, > 1L brown drainage. IR clamped drain in evening & discontinued irrigation orders; monitoring pt's pain tolerance, imaging planned for 8/23. Pt refused CHG bath, stating \"later\". PT assisted pt to sit at bedside with lift & assist x2.  Dialysis scheduled tomorrow at 8am.     Continue to monitor pt's respiratory status.     "

## 2021-08-17 NOTE — PLAN OF CARE
"Assumed cares 5537-8904     /53 (BP Location: Right arm)   Pulse (!) 123   Temp 97  F (36.1  C) (Oral)   Resp 18   Ht 1.676 m (5' 6\")   Wt 77.5 kg (170 lb 13.7 oz)   SpO2 95%   BMI 27.58 kg/m       Pain: Patient has generalized pain \"everywhere\" pre patient. Given scheduled and PRN dilaudid.   Neuro:  A&Ox4.   Respiratory: Lungs diminished in all lobes, dyspnea on exertion. Non-productive cough. On 4L NC.   Cardiac/Neurovascular: On tele. HR tachycardic. No numbness or tingling reported. Mild edema in lower extremities.   GI/: Oliguria, on hemodialysis. Patient is intermittently incontinent of stool. Multiple loose BM's today.   Nutrition: NPO. Paged MD about changing order per pt request. TF running at 60ml/hr (goal rate). Changed to regular diet at 1830.   Activity: Bed bound. Intermittently refusing re postioning.   Skin: Blanchable red sacral area. Barrier cream applied. Dry and peeling feet.   Lines: Left PICC (triple lumen). Infusing TKO. GJ tube. J tube to  tube feeds. G to drainage page. Peritoneal tube left flank (clamped).   Events this shift: Patient went down to dialysis today. Patient did flag sepsis. Vitals remained stable, HR baseline tachy. Lactic not drawn by lab, Called lab. Waiting to be drawn.      Plan: Continue with plan of care.     "

## 2021-08-17 NOTE — PROGRESS NOTES
Calorie Count  Intake recorded for: 8/16  Total Kcals: 0 Total Protein: 0g  Kcals from Hospital Food: 0   Protein: 0g  Kcals from Outside Food (average):0 Protein: 0g  # Meals Recorded: no meals ordered form kitchen, no intake recorded.   # Supplements Recorded: no intake recorded.

## 2021-08-17 NOTE — PROGRESS NOTES
[facility]  Progress Note - [facility] 3 Service        Date of Admission:  6/28/2021    Assessment & Plan   Dax Villareal is a 30 year old male with hx of alcohol use disorder admitted on 6/28/2021 after recent prolonged admission at [facility] in [place] who has severe acute alcoholic hepatitis c/b HE, ESRD requiring HD, and malnutrition in the setting of acute necrotizing pancreatitis, s/p PEG-J placement on 7/13. He arrived with acute respiratory failure and septic shock that have resolved, and his secondary bacterial peritonitis has been appropriately treated. He requires continued management for ESRD, hepatic encephalopathy, infected pancreatic pseudocyst, nutritional support via PEG-J, and persistent right pleural effusion. Clinically improving after multiple necrosectomies.     Today:  - RP drain remains capped. Will monitor for tolerance with plan for repeat imaging on 8/23. Managed by IR.  - Dialysis today (TTS)  - Continuing to follow up on need for thora/para    # Necrotizing alcoholic pancreatitis w/ infected pseudocyst s/p RP Drain and Cystogastrostomy  # Septic Shock, resolved   # Leukocytosis    Admitted to the [facility] ICU on 6/28/2021 from [facility] in [place] for septic shock due to necrotizing pancreatitis. CT on admit showing mature collection with enhancing wall measuring ~[id number]. IR placed perc drain 6/29. Repeat imaging 7/18 revealed walled off necrotic collection amenable to endoscopic transluminal drainage with cystgastrostomy stenting with necrosectomy on 7/21/21.  7/28 - EGD, stent replacement (transluminal Axios with coaxial Solus), PEG-J removal (buried bumper) and replacement of new PEG-J with one new T-tag. Repeat necrosectomy and Axios removal/replacement Friday 8/6. Repeat CT on 8/9. 8/11 - EGD with completion necrosectomy, no e/o bowel fistula, no plans for further necrosectomy unless further pain/fevers warrants further evaluation.  RP drain capped with plans for reimaging on 8/23/2021     - Continue Cefepime 1 gm Q 24 + flagyl, previously recommended to continue through final debridement, will reevaluate as pt progresses clinically  - Daily CBC, watching fever curve  - GI following, appreciate recs              - Pancreatic enzymes   - Flush drain with 100ml q4hr per GI   - Blood cultures and fluid cultures NGTD    # Secondary Bacterial Peritonitis 2/2 infected pseudocyst  # Peripancreatic fluid collection growing VRE   7/2 - Diagnostic paracentesis (WBC 2663 - 74% PMN, SAAG <1.1, protein 4.1)     Antibiotics:   Current: Cefepime 1 gm Q 24 + flagyl 500 q8 (8/6-present)     Past:  - Linezolid 7/21-7/30  - Vancomcyin 6/29  - Meropenem 6/29-7/18  - Micafungin 6/29-7/1  - Cipro 7/19-7/21  - Ceftriaxone 7/21-7/24; 7/26-7/27  - Zosyn 7/27-8/6    - Paracentesis PRN      # Right hepatic/pancreatic hydrothorax  Intermittently increased subjective shortness of breath. CXR on 7/19 w/ increased RLL haziness; ddx includes pleural effusion v pneumonia v atelectasis. CT on 7/28 showed large volume R hepatic hydrothorax with complete atelectasis of right lung, bedside thora completed 7/28 with labs and cultures.     CXR 7/30 showed moderate R pleural effusion, unchanged from previous and improved aeration of right lung with small remaining pleural effusion on CT. Thora cultures negative. Repeat Thora on 8/9 showing exudative fluid     - IR completed thoracentesis 7/28 - exudative  - ID recommended continued empiric abx, though pleural effusion most likely 2/2 hepatic hydrothorax and pancreatitis. Defer plan for repeat thoracentesis based on symptoms and oxygen requirement. No plan for chest tube given concern for infectious nidus  - Supplemental oxygen as needed to keep sats above 89%  - Repeat Thoracentesis PRN for dyspnea    #Hepatic Abscess  CT Ab/pelvis from 8/13 with developing hepatic abscess vs. infarct. IR consulted. No intervention for abscesses <5  "cm and appears more likely infarct rather than abscess per IR. Risk of procedural intervention outweighs benefit at this point.   - Will reassess with future imaging on 823.  - Blood cultures from 8/13 NGTD  - Continuing current antibiotics     #Hematochezia, resolved  Patient CTA-Chest on 8/8 with \"possible\" non-obstructive PE in [facility] Pulmonary Artery Branch. Patient has largely been bed bound and not on DVT prophylaxis for several days, especially in setting of recent procedures. Troponin negative x2. BRBPR with heparin. Ultrasounds negative for DVT.  - CTA-Chest on 8/10 negative for PE, no indication for anticuagulation  - Hematology recommends starting prophylactic dose anticoagulation once stable x1 week, stepping up to unfractionated heparin, likely will need 3 months of anticoagulation given hypercoagulable state of necrotizing pancreatitis   - Heme to be contacted prior to discharge when ready to transition to orals    # Lactic acidosis  Multifactorial d/t decreased lactic clearance in the setting of acute hepatic failure, ESRD, and acute on chronic infection.  - Continue to trend and assess vitals     # Gastric Outlet Obstruction  High G tube output due to the cystogastric connection/fluid drainage noted on 8/5. Per GI, possibly related to gastric outlet from severe abdominal inflammation and expected. KUB negative for obstructive gas pattern. Ileus from narcotics considered, but output not consistent with tube feeds. Improved after cystogastrectomy  -Anti-emetics  -Will monitor and continue to discuss with GI  -Having fluid removal per nephrology due to HD    #Hypokalemia, resolved  High GI output state. Replete as needed with ESRD noted. Improved with decreased G-tube drainage after cystogastrectomy.    # Severe malnutrition in setting of chronic illness  PEG-J placed 7/13. Patient intermittently refusing tube feeds d/t nausea/vomiting. Improving oral intake s/p necresectomies.  - Dietitian consult for new " tube feeds, appreciate recs   - Continuous TF per dietician order with goal of 65 ml/hr              - Cont panc enzymes   - Will trial regular diet with level 0 liquids as below  - Regular Diet, thin liquids per SLP  - VFSS for 8/10 to evaluate for dysphagia - cleared for level 0 liquids though vomited following  - Continue multivitamins  - Pantoprazole 40 mg IV daily  - Zofran PRN    Malnutrition:    - Level of malnutrition: Severe   - Based on: moderate/severe subcutaneous fat loss, moderate/severe muscle loss     # Alcoholic hepatitis complicated by portal hypertension, ascites, and HE - improving   # Hx of Alcohol use disorder c/b withdrawal   # Anemia  # Coagulopathy   Patient with a history of marked alcohol use and alcoholic hepatitis in the past. Was given a course of steroids (1 week) during previous admission at OSH. Patient mental status has been stable, alert and oriented x4 after rifaxamin and aggressive lactulose. Not a liver transplant candidate.   - Lactulose 20g daily and PRN and rifaximin 550mg BID - as patient allows  - Daily folic acid and thiamine  - Delirium precautions  - Melatonin 6mg at bedtime  - Daily CBC   - Transfuse if hemoglobin < 7, platelet < 10 or < 30 with bleeding  - Continue to monitor for signs of bleeding      # Stage III RADHA on HD due to ATN  # ESRD due to ATN  # Hypervolemia causing acute respiratory failure, resolved  Nephrology note 7/21 indicates that patient has been on renal replacement therapy for two months and pt status has most likely transitioned from ARF to ESRD at this point.      > Hemodialysis as per renal reccs - TTS              - Midodrine 10 mg PRN before dialysis sessions     > Daily chemistry labs, strict I/O, and daily weights  - Aspiration precautions  - Supplemental oxygen as needed to keep sats above 89%    # Anemia  Baseline hgb 17. Running between 6-8 in setting of chronic illness, new ESRD, frequent lab draws and procedures.  - Goal hgb >7.0, trend  Hgb    # Intermittent lactic acidosis  Suspect due to poor lactate clearance due to ESRD and ESLD + ongoing inflammation/infection.    # Apathy  Patient is intermittently refusing cares due to wanting a 'break,' but also reaffirms that he wants everything done to prolong his life. Mood is depressed, apathetic at times. Patient has multiple stressors including his own medical conditions and family health issues.  - Continue to offer emotional support services such as psychiatry, palliative,   - He declined psychiatry/psychology visits     # Goals of Care  Poor prognosis with multiorgan failure and non-compliance with treatment team. Palliative care and care conference on 7/19. Patient not participatory. Prognosis is gaurded with long course to rehab  - Hepatology confirmed pt will not be a transplant candidate  - Daily phone updates to sister Noni ([phone])       Diet: Adult Formula Drip Feeding: Continuous Vital 1.5; Jejunostomy; Goal Rate: 60; mL/hr; Medication - Feeding Tube Flush Frequency: At least 15-30 mL water before and after medication administration and with tube clogging; Amount to Send (Nutrition us...  NPO for Medical/Clinical Reasons Except for: Meds    DVT Prophylaxis: Pneumatic Compression Devices  Rdz Catheter: Not present  Fluids: 10 mL NS TKO  Central Lines: PRESENT  PICC Triple Lumen 06/28/21 Left-Site Assessment: WDL  CVC Double Lumen Right-Site Assessment: WDL  Code Status: Full Code      Disposition Plan   Expected discharge: >7 days, recommended to transitional care unit once  nutrition improved, infected pancreatic pseudocyst is well managed and plan for dialysis is in place.       Justin Vieyra MD - PGY-1  [address]  Securely message with the Vocera Web Console (learn more here)  Text page via [email or web address] [email or web address]  Please see sign in/sign out for up to date coverage  information  _____________________________________________________________________    Interval History    Overall pain seems unchanged, controlled this am with current regimen. Denies significant dyspnea at rest, some nausea, no diarrhea recently. Planning on dialysis today.    Physical Exam   Vital Signs: Temp: 97  F (36.1  C) Temp src: Oral BP: 104/53 Pulse: (!) 123   Resp: 18 SpO2: 95 % O2 Device: None (Room air) Oxygen Delivery: 4 LPM  Weight: 170 lbs 13.7 oz  Constitutional: Sleeping initially though wakes to participate with interview and examination, overall no acute distress  HEENT: No scleral icterus or conjunctival erythema  Cardiovascular: tachycardic, regular rhythm  Respiratory: Mildly tachypneic, decreased breath sounds on right, on NC on for comfort  Abdomen: No significant tenderness to palpation, mildly distended, PEG-tube site clean  Musculoskeletal: Emaciated, moving all extremities on examination    Data   Recent Labs   Lab 08/17/21  0730 08/16/21  0533 08/15/21  1103 08/15/21  0520   WBC 13.6* 13.1*  --  15.1*   HGB 7.7* 7.8*  --  7.6*   MCV 92 94  --  92    289  --  286   INR  --   --  1.86*  --    * 132*  --  133   POTASSIUM 4.4 4.2  --  3.9   CHLORIDE 97 98  --  100   CO2 24 24  --  27   BUN 39* 28  --  16   CR 2.82* 2.32*  --  1.72*   ANIONGAP 9 10  --  6   JANENE 8.1* 8.1*  --  7.9*   * 136*  --  132*   ALBUMIN 1.4* 1.4*  --  1.5*   PROTTOTAL 6.6* 6.8  --  6.9   BILITOTAL 1.8* 1.8*  --  2.5*   ALKPHOS 106 113  --  121   ALT 22 22  --  25   AST 40 45  --  54*

## 2021-08-17 NOTE — PLAN OF CARE
"[id number]: /68 (BP Location: Right arm)   Pulse (!) 124   Temp (!) 96.7  F (35.9  C) (Oral)   Resp 22   Ht 1.676 m (5' 6\")   Wt 78.5 kg (173 lb 1 oz)   SpO2 97%   BMI 27.93 kg/m      Patient complained of pain in \"knee, elbow, back.\" Pt requested dilaudid, dilaudid 1 mg prn given x1 with good improvement.  Tachycardic, on telemetry, HR 120s. Frequent loose cough, pt using yankuer suction, dyspnea with minimal exertion, on 4 L NC, pt not keeping pulse ox monitor on. Tube feeds through J tube at goal at 60 mL/hr with free water flushes. G tube to gravity with no drainage. NPO due to frequent coughing. Left peritoneal dressing clean, dry, intact, drain is clamped. Used bedpan, 200 ml watery brown stool, voided 50 ml tea colored urine in urinal. Sleeping between cares. Continue to monitor pt's respiratory status and plan of care.       Problem: Attention and Thought Clarity Impairment (Delirium)  Goal: Improved Attention and Thought Clarity  Outcome: No Change     Problem: Sleep Disturbance (Delirium)  Goal: Improved Sleep  Outcome: No Change     Problem: Fluid Imbalance (Pancreatitis)  Goal: Fluid Balance  Outcome: No Change     Problem: Infection (Pancreatitis)  Goal: Infection Symptom Resolution  Outcome: No Change     Problem: Infection  Goal: Absence of Infection Signs and Symptoms  Outcome: No Change                "

## 2021-08-18 ENCOUNTER — APPOINTMENT (OUTPATIENT)
Dept: PHYSICAL THERAPY | Facility: CLINIC | Age: 31
End: 2021-08-18
Attending: INTERNAL MEDICINE
Payer: MEDICAID

## 2021-08-18 LAB
ALBUMIN SERPL-MCNC: 1.4 G/DL (ref 3.4–5)
ALP SERPL-CCNC: 112 U/L (ref 40–150)
ALT SERPL W P-5'-P-CCNC: 20 U/L (ref 0–70)
ANION GAP SERPL CALCULATED.3IONS-SCNC: 6 MMOL/L (ref 3–14)
AST SERPL W P-5'-P-CCNC: 40 U/L (ref 0–45)
BACTERIA BLD CULT: NO GROWTH
BACTERIA BLD CULT: NO GROWTH
BILIRUB SERPL-MCNC: 1.6 MG/DL (ref 0.2–1.3)
BUN SERPL-MCNC: 22 MG/DL (ref 7–30)
CALCIUM SERPL-MCNC: 8 MG/DL (ref 8.5–10.1)
CHLORIDE BLD-SCNC: 100 MMOL/L (ref 94–109)
CO2 SERPL-SCNC: 26 MMOL/L (ref 20–32)
CREAT SERPL-MCNC: 1.77 MG/DL (ref 0.66–1.25)
ERYTHROCYTE [DISTWIDTH] IN BLOOD BY AUTOMATED COUNT: 17.5 % (ref 10–15)
GFR SERPL CREATININE-BSD FRML MDRD: 50 ML/MIN/1.73M2
GLUCOSE BLD-MCNC: 135 MG/DL (ref 70–99)
HCT VFR BLD AUTO: 24.1 % (ref 40–53)
HGB BLD-MCNC: 7.5 G/DL (ref 13.3–17.7)
MAGNESIUM SERPL-MCNC: 1.7 MG/DL (ref 1.6–2.3)
MCH RBC QN AUTO: 29.9 PG (ref 26.5–33)
MCHC RBC AUTO-ENTMCNC: 31.1 G/DL (ref 31.5–36.5)
MCV RBC AUTO: 96 FL (ref 78–100)
PHOSPHATE SERPL-MCNC: 3.2 MG/DL (ref 2.5–4.5)
PLATELET # BLD AUTO: 303 10E3/UL (ref 150–450)
POTASSIUM BLD-SCNC: 4.3 MMOL/L (ref 3.4–5.3)
PROT SERPL-MCNC: 6.8 G/DL (ref 6.8–8.8)
RBC # BLD AUTO: 2.51 10E6/UL (ref 4.4–5.9)
SODIUM SERPL-SCNC: 132 MMOL/L (ref 133–144)
WBC # BLD AUTO: 12 10E3/UL (ref 4–11)

## 2021-08-18 PROCEDURE — 250N000013 HC RX MED GY IP 250 OP 250 PS 637: Performed by: INTERNAL MEDICINE

## 2021-08-18 PROCEDURE — 82040 ASSAY OF SERUM ALBUMIN: CPT | Performed by: INTERNAL MEDICINE

## 2021-08-18 PROCEDURE — 120N000002 HC R&B MED SURG/OB UMMC

## 2021-08-18 PROCEDURE — 99207 PR CDG-CUT & PASTE-POTENTIAL IMPACT ON LEVEL: CPT | Performed by: STUDENT IN AN ORGANIZED HEALTH CARE EDUCATION/TRAINING PROGRAM

## 2021-08-18 PROCEDURE — 99233 SBSQ HOSP IP/OBS HIGH 50: CPT | Mod: GC | Performed by: STUDENT IN AN ORGANIZED HEALTH CARE EDUCATION/TRAINING PROGRAM

## 2021-08-18 PROCEDURE — 83735 ASSAY OF MAGNESIUM: CPT | Performed by: INTERNAL MEDICINE

## 2021-08-18 PROCEDURE — 250N000011 HC RX IP 250 OP 636: Performed by: INTERNAL MEDICINE

## 2021-08-18 PROCEDURE — 85027 COMPLETE CBC AUTOMATED: CPT | Performed by: INTERNAL MEDICINE

## 2021-08-18 PROCEDURE — 97530 THERAPEUTIC ACTIVITIES: CPT | Mod: GP | Performed by: REHABILITATION PRACTITIONER

## 2021-08-18 PROCEDURE — 250N000013 HC RX MED GY IP 250 OP 250 PS 637: Performed by: STUDENT IN AN ORGANIZED HEALTH CARE EDUCATION/TRAINING PROGRAM

## 2021-08-18 PROCEDURE — 80053 COMPREHEN METABOLIC PANEL: CPT | Performed by: INTERNAL MEDICINE

## 2021-08-18 PROCEDURE — 250N000011 HC RX IP 250 OP 636: Performed by: STUDENT IN AN ORGANIZED HEALTH CARE EDUCATION/TRAINING PROGRAM

## 2021-08-18 PROCEDURE — 36592 COLLECT BLOOD FROM PICC: CPT | Performed by: INTERNAL MEDICINE

## 2021-08-18 PROCEDURE — 99233 SBSQ HOSP IP/OBS HIGH 50: CPT | Performed by: CLINICAL NURSE SPECIALIST

## 2021-08-18 PROCEDURE — 84100 ASSAY OF PHOSPHORUS: CPT | Performed by: INTERNAL MEDICINE

## 2021-08-18 RX ORDER — HYDROXYZINE HYDROCHLORIDE 25 MG/1
25-50 TABLET, FILM COATED ORAL DAILY PRN
Status: DISCONTINUED | OUTPATIENT
Start: 2021-08-18 | End: 2021-08-23

## 2021-08-18 RX ORDER — OXYCODONE HCL 5 MG/5 ML
5 SOLUTION, ORAL ORAL EVERY 8 HOURS
Status: DISCONTINUED | OUTPATIENT
Start: 2021-08-18 | End: 2021-09-20

## 2021-08-18 RX ADMIN — HYDROMORPHONE HYDROCHLORIDE 1 MG: 1 SOLUTION ORAL at 23:21

## 2021-08-18 RX ADMIN — ONDANSETRON 4 MG: 2 INJECTION INTRAMUSCULAR; INTRAVENOUS at 18:30

## 2021-08-18 RX ADMIN — Medication 1 PACKET: at 14:39

## 2021-08-18 RX ADMIN — Medication 1 PACKET: at 08:12

## 2021-08-18 RX ADMIN — PANCRELIPASE 2 CAPSULE: 24000; 76000; 120000 CAPSULE, DELAYED RELEASE PELLETS ORAL at 08:11

## 2021-08-18 RX ADMIN — HYDROXYZINE HYDROCHLORIDE 50 MG: 25 TABLET, FILM COATED ORAL at 12:27

## 2021-08-18 RX ADMIN — OXYCODONE HYDROCHLORIDE 5 MG: 5 SOLUTION ORAL at 12:11

## 2021-08-18 RX ADMIN — HYDROXYZINE HYDROCHLORIDE 50 MG: 25 TABLET, FILM COATED ORAL at 18:29

## 2021-08-18 RX ADMIN — PANCRELIPASE 2 CAPSULE: 24000; 76000; 120000 CAPSULE, DELAYED RELEASE PELLETS ORAL at 12:11

## 2021-08-18 RX ADMIN — SODIUM BICARBONATE 325 MG: 325 TABLET ORAL at 12:10

## 2021-08-18 RX ADMIN — METRONIDAZOLE 500 MG: 500 INJECTION, SOLUTION INTRAVENOUS at 05:01

## 2021-08-18 RX ADMIN — METRONIDAZOLE 500 MG: 500 INJECTION, SOLUTION INTRAVENOUS at 20:11

## 2021-08-18 RX ADMIN — ACETAMINOPHEN 325 MG: 325 SOLUTION ORAL at 18:30

## 2021-08-18 RX ADMIN — HYDROMORPHONE HYDROCHLORIDE 2 MG: 1 SOLUTION ORAL at 01:11

## 2021-08-18 RX ADMIN — SODIUM BICARBONATE 325 MG: 325 TABLET ORAL at 08:11

## 2021-08-18 RX ADMIN — SODIUM BICARBONATE 325 MG: 325 TABLET ORAL at 01:11

## 2021-08-18 RX ADMIN — SODIUM BICARBONATE 325 MG: 325 TABLET ORAL at 20:10

## 2021-08-18 RX ADMIN — SODIUM BICARBONATE 325 MG: 325 TABLET ORAL at 05:01

## 2021-08-18 RX ADMIN — CEFEPIME HYDROCHLORIDE 1 G: 1 INJECTION, POWDER, FOR SOLUTION INTRAMUSCULAR; INTRAVENOUS at 20:11

## 2021-08-18 RX ADMIN — PANCRELIPASE 2 CAPSULE: 24000; 76000; 120000 CAPSULE, DELAYED RELEASE PELLETS ORAL at 16:09

## 2021-08-18 RX ADMIN — SODIUM BICARBONATE 325 MG: 325 TABLET ORAL at 16:10

## 2021-08-18 RX ADMIN — PANCRELIPASE 2 CAPSULE: 24000; 76000; 120000 CAPSULE, DELAYED RELEASE PELLETS ORAL at 08:12

## 2021-08-18 RX ADMIN — METRONIDAZOLE 500 MG: 500 INJECTION, SOLUTION INTRAVENOUS at 12:10

## 2021-08-18 RX ADMIN — Medication 1 PACKET: at 20:24

## 2021-08-18 RX ADMIN — PANCRELIPASE 2 CAPSULE: 24000; 76000; 120000 CAPSULE, DELAYED RELEASE PELLETS ORAL at 01:11

## 2021-08-18 RX ADMIN — PANCRELIPASE 2 CAPSULE: 24000; 76000; 120000 CAPSULE, DELAYED RELEASE PELLETS ORAL at 20:11

## 2021-08-18 RX ADMIN — Medication 5 ML: at 08:11

## 2021-08-18 RX ADMIN — PANCRELIPASE 2 CAPSULE: 24000; 76000; 120000 CAPSULE, DELAYED RELEASE PELLETS ORAL at 05:02

## 2021-08-18 RX ADMIN — MELATONIN TAB 3 MG 6 MG: 3 TAB at 22:57

## 2021-08-18 RX ADMIN — THIAMINE HCL TAB 100 MG 100 MG: 100 TAB at 08:11

## 2021-08-18 RX ADMIN — OXYCODONE HYDROCHLORIDE 5 MG: 5 SOLUTION ORAL at 20:11

## 2021-08-18 RX ADMIN — FOLIC ACID 1 MG: 1 TABLET ORAL at 08:11

## 2021-08-18 RX ADMIN — HYDROMORPHONE HYDROCHLORIDE 2 MG: 1 SOLUTION ORAL at 08:11

## 2021-08-18 RX ADMIN — HYDROMORPHONE HYDROCHLORIDE 1 MG: 1 SOLUTION ORAL at 16:15

## 2021-08-18 ASSESSMENT — ACTIVITIES OF DAILY LIVING (ADL)
ADLS_ACUITY_SCORE: 18

## 2021-08-18 ASSESSMENT — MIFFLIN-ST. JEOR: SCORE: 1668.75

## 2021-08-18 NOTE — PLAN OF CARE
"Assumed cares 5900-7263     /50 (BP Location: Right arm)   Pulse 119   Temp 97.3  F (36.3  C) (Oral)   Resp 16   Ht 1.676 m (5' 6\")   Wt 77.5 kg (170 lb 13.7 oz)   SpO2 92%   BMI 27.58 kg/m       Pain: Patient has generalized pain \"everywhere\" pre patient. Given scheduled dilaudid, switched to oxy later in day.    Neuro:  A&Ox4.   Respiratory: Lungs diminished in all lobes, dyspnea on exertion. Non-productive cough.  On 2L NC.   Cardiac/Neurovascular: HR tachycardic. No numbness or tingling reported. Mild edema in lower extremities.   GI/: Oliguria, on hemodialysis. Patient is intermittently incontinent of stool. Multiple loose BM's today.   Nutrition: regular diet. TF running at 60ml/hr (goal rate). Changed to regular diet at 1830.   Activity: Bed bound. Intermittently refusing re postioning.   Skin: Blanchable red sacral area. Barrier cream applied. Dry and peeling feet.   Lines: Left PICC (triple lumen). Infusing TKO. GJ tube. J tube to  tube feeds. G to drainage bag. Peritoneal tube left flank (clamped).   Events this shift: Patient refused to eat breakfast his AM, ordered a late lunch. Minimal appetite. Pt also refusing head of bed above 30 degrees, educated about importance with TF. Pt still refusing. Patient was weaned down from 4L to 2L NC.      Plan: Continue with plan of care.        "

## 2021-08-18 NOTE — PROGRESS NOTES
[facility]  Progress Note - [place] 3 Service        Date of Admission:  6/28/2021    Assessment & Plan   Dax Villareal is a 30 year old male with hx of alcohol use disorder admitted on 6/28/2021 after recent prolonged admission at [facility] in [place] who has severe acute alcoholic hepatitis c/b HE, ESRD requiring HD, and malnutrition in the setting of acute necrotizing pancreatitis, s/p PEG-J placement on 7/13. He arrived with acute respiratory failure and septic shock that have resolved, and his secondary bacterial peritonitis has been appropriately treated. He requires continued management for ESRD, hepatic encephalopathy, infected pancreatic pseudocyst, nutritional support via PEG-J, and persistent right pleural effusion. Clinically improving after multiple necrosectomies.     Today:  - IR drain capped, repeat image for 8/23  - Switched scheduled dilaudid to equivalent oxycodone  - Encourage PT/OT and Para/Thora    # Necrotizing alcoholic pancreatitis w/ infected pseudocyst s/p RP Drain and Cystogastrostomy  # Septic Shock, resolved   # Leukocytosis    Admitted to the [facility] ICU on 6/28/2021 from [facility] in [place] for septic shock due to necrotizing pancreatitis. CT on admit showing mature collection with enhancing wall measuring ~[id number]. IR placed perc drain 6/29. Repeat imaging 7/18 revealed walled off necrotic collection amenable to endoscopic transluminal drainage with cystgastrostomy stenting with necrosectomy on 7/21/21.  7/28 - EGD, stent replacement (transluminal Axios with coaxial Solus), PEG-J removal (buried bumper) and replacement of new PEG-J with one new T-tag. Repeat necrosectomy and Axios removal/replacement Friday 8/6. Repeat CT on 8/9. 8/11 - EGD with completion necrosectomy, no e/o bowel fistula, no plans for further necrosectomy unless further pain/fevers warrants further evaluation. RP drain capped with plans for reimaging on  8/23/2021     - Continue Cefepime 1 gm Q 24 + flagyl, previously recommended to continue through final debridement, will reevaluate as pt progresses clinically  - Daily CBC, watching fever curve  - GI following, appreciate recs              - Pancreatic enzymes   - Flush drain with 100ml q4hr per GI   - Blood cultures and fluid cultures NGTD    # Secondary Bacterial Peritonitis 2/2 infected pseudocyst  # Peripancreatic fluid collection growing VRE   7/2 - Diagnostic paracentesis (WBC 2663 - 74% PMN, SAAG <1.1, protein 4.1)     Antibiotics:   Current: Cefepime 1 gm Q 24 + flagyl 500 q8 (8/6-present)     Past:  - Linezolid 7/21-7/30  - Vancomcyin 6/29  - Meropenem 6/29-7/18  - Micafungin 6/29-7/1  - Cipro 7/19-7/21  - Ceftriaxone 7/21-7/24; 7/26-7/27  - Zosyn 7/27-8/6    - Paracentesis PRN      # Right hepatic/pancreatic hydrothorax  Intermittently increased subjective shortness of breath. CXR on 7/19 w/ increased RLL haziness; ddx includes pleural effusion v pneumonia v atelectasis. CT on 7/28 showed large volume R hepatic hydrothorax with complete atelectasis of right lung, bedside thora completed 7/28 with labs and cultures.     CXR 7/30 showed moderate R pleural effusion, unchanged from previous and improved aeration of right lung with small remaining pleural effusion on CT. Thora cultures negative. Repeat Thora on 8/9 showing exudative fluid     - IR completed thoracentesis 7/28 - exudative  - ID recommended continued empiric abx, though pleural effusion most likely 2/2 hepatic hydrothorax and pancreatitis. Defer plan for repeat thoracentesis based on symptoms and oxygen requirement. No plan for chest tube given concern for infectious nidus  - Supplemental oxygen as needed to keep sats above 89%  - Repeat Thoracentesis PRN for dyspnea    #Hepatic Abscess  CT Ab/pelvis from 8/13 with developing hepatic abscess vs. infarct. IR consulted. No intervention for abscesses <5 cm and appears more likely infarct rather  "than abscess per IR. Risk of procedural intervention outweighs benefit at this point.   - Will reassess with future imaging on 823.  - Blood cultures from 8/13 NGTD  - Para & thora differed per GI  - Continuing current antibiotics     #Hematochezia, resolved  Patient CTA-Chest on 8/8 with \"possible\" non-obstructive PE in [facility] Pulmonary Artery Branch. Patient has largely been bed bound and not on DVT prophylaxis for several days, especially in setting of recent procedures. Troponin negative x2. BRBPR with heparin. Ultrasounds negative for DVT.  - CTA-Chest on 8/10 negative for PE, no indication for anticuagulation  - Hematology recommends starting prophylactic dose anticoagulation once stable x1 week, stepping up to unfractionated heparin, likely will need 3 months of anticoagulation given hypercoagulable state of necrotizing pancreatitis   - Heme to be contacted prior to discharge when ready to transition to orals    # Lactic acidosis  Multifactorial d/t decreased lactic clearance in the setting of acute hepatic failure, ESRD, and acute on chronic infection.  - Continue to trend and assess vitals     # Gastric Outlet Obstruction  High G tube output due to the cystogastric connection/fluid drainage noted on 8/5. Per GI, possibly related to gastric outlet from severe abdominal inflammation and expected. KUB negative for obstructive gas pattern. Ileus from narcotics considered, but output not consistent with tube feeds. Improved after cystogastrectomy  -Anti-emetics  -Will monitor and continue to discuss with GI  -Having fluid removal per nephrology due to HD    #Hypokalemia, resolved  High GI output state. Replete as needed with ESRD noted. Improved with decreased G-tube drainage after cystogastrectomy.    # Severe malnutrition in setting of chronic illness  PEG-J placed 7/13. Patient intermittently refusing tube feeds d/t nausea/vomiting. Improving oral intake s/p necresectomies.  - Dietitian consult for new tube " feeds, appreciate recs   - Continuous TF per dietician order with goal of 65 ml/hr              - Cont panc enzymes   - Will trial regular diet with level 0 liquids as below  - Regular Diet, thin liquids per SLP  - VFSS for 8/10 to evaluate for dysphagia - cleared for level 0 liquids though vomited following  - Continue multivitamins  - Pantoprazole 40 mg IV daily  - Zofran PRN    Malnutrition:    - Level of malnutrition: Severe   - Based on: moderate/severe subcutaneous fat loss, moderate/severe muscle loss     # Alcoholic hepatitis complicated by portal hypertension, ascites, and HE - improving   # Hx of Alcohol use disorder c/b withdrawal   # Anemia  # Coagulopathy   Patient with a history of marked alcohol use and alcoholic hepatitis in the past. Was given a course of steroids (1 week) during previous admission at OSH. Patient mental status has been stable, alert and oriented x4 after rifaxamin and aggressive lactulose. Not a liver transplant candidate.   - Lactulose 20g daily and PRN and rifaximin 550mg BID - as patient allows  - Daily folic acid and thiamine  - Delirium precautions  - Melatonin 6mg at bedtime  - Daily CBC   - Transfuse if hemoglobin < 7, platelet < 10 or < 30 with bleeding  - Continue to monitor for signs of bleeding      # Stage III RADHA on HD due to ATN  # ESRD due to ATN  # Hypervolemia causing acute respiratory failure, resolved  Nephrology note 7/21 indicates that patient has been on renal replacement therapy for two months and pt status has most likely transitioned from ARF to ESRD at this point.      > Hemodialysis as per renal reccs - TTS              - Midodrine 10 mg PRN before dialysis sessions     > Daily chemistry labs, strict I/O, and daily weights  - Aspiration precautions  - Supplemental oxygen as needed to keep sats above 89%    # Anemia  Baseline hgb 17. Running between 6-8 in setting of chronic illness, new ESRD, frequent lab draws and procedures.  - Goal hgb >7.0, trend  Hgb    # Intermittent lactic acidosis  Suspect due to poor lactate clearance due to ESRD and ESLD + ongoing inflammation/infection. No fluids given pt is on dialysis; however, did receive Albumin last night.    # Apathy  Patient is intermittently refusing cares due to wanting a 'break,' but also reaffirms that he wants everything done to prolong his life. Mood is depressed, apathetic at times. Patient has multiple stressors including his own medical conditions and family health issues.  - Continue to offer emotional support services such as psychiatry, palliative,   - He declined psychiatry/psychology visits     # Goals of Care  Poor prognosis with multiorgan failure and non-compliance with treatment team. Palliative care and care conference on 7/19. Patient not participatory. Prognosis is gaurded with long course to rehab  - Hepatology confirmed pt will not be a transplant candidate  - Daily phone updates to sister Noni ([phone])       Diet: Adult Formula Drip Feeding: Continuous Vital 1.5; Jejunostomy; Goal Rate: 60; mL/hr; Medication - Feeding Tube Flush Frequency: At least 15-30 mL water before and after medication administration and with tube clogging; Amount to Send (Nutrition us...  Regular Diet Adult    DVT Prophylaxis: Pneumatic Compression Devices  Rdz Catheter: Not present  Fluids: 10 mL NS TKO  Central Lines: PRESENT  PICC Triple Lumen 06/28/21 Left-Site Assessment: WDL  CVC Double Lumen Right-Site Assessment: WDL  Code Status: Full Code      Disposition Plan   Expected discharge: >7 days, recommended to transitional care unit once  nutrition improved, infected pancreatic pseudocyst is well managed and plan for dialysis is in place.       Johny Ortiz MD  [phone]  [address]  Securely message with the Vocera Web Console (learn more here)  Text page via [employer] Paging/Directory  Please see sign in/sign out for up to date coverage  information  _____________________________________________________________________    Interval History    Decreased drainage output; RP drainage capped. Diarrhea x5, 2LPM O2. Per nursing, was on continuous feeds for the past 24 hours without held meds.    Physical Exam   Vital Signs: Temp: 97.3  F (36.3  C) Temp src: Oral BP: 101/50 Pulse: 119   Resp: 16 SpO2: 97 % O2 Device: Nasal cannula Oxygen Delivery: 4 LPM  Weight: 170 lbs 13.7 oz  Constitutional: Awake, non-diaphoretic, conversational regarding upcoming surgery plans  HEENT: Normocephalic. Atraumatic. No scleral icterus.  Cardiovascular: tachycardic, tender chest to palpation diffusely  Respiratory: Tachypnic, Decreased breath sounds on right, NC on for comfort  Abdomen: Tolerates palpation with minimal pain, PEG-tube site clean, RUQ tenderness  Musculoskeletal: Emaciated, moving all extremities on examination  Skin: no Jaundice    Data   Recent Labs   Lab 08/18/21  0554 08/17/21  0730 08/16/21  0533 08/15/21  1103   WBC 12.0* 13.6* 13.1*  --    HGB 7.5* 7.7* 7.8*  --    MCV 96 92 94  --     317 289  --    INR  --   --   --  1.86*   * 130* 132*  --    POTASSIUM 4.3 4.4 4.2  --    CHLORIDE 100 97 98  --    CO2 26 24 24  --    BUN 22 39* 28  --    CR 1.77* 2.82* 2.32*  --    ANIONGAP 6 9 10  --    JANENE 8.0* 8.1* 8.1*  --    * 151* 136*  --    ALBUMIN 1.4* 1.4* 1.4*  --    PROTTOTAL 6.8 6.6* 6.8  --    BILITOTAL 1.6* 1.8* 1.8*  --    ALKPHOS 112 106 113  --    ALT 20 22 22  --    AST 40 40 45  --

## 2021-08-18 NOTE — PROGRESS NOTES
CLINICAL NUTRITION SERVICES - REASSESSMENT NOTE     Nutrition Prescription    RECOMMENDATIONS FOR MDs/PROVIDERS TO ORDER:  If tube feed to provide for hydration needs, would recommend 840 ml free water daily ( PO + via flushes) to meet lower end hydration needs.     Malnutrition Status:    Severe malnutrition in the context of acute on chronic illness     Recommendations already ordered by Registered Dietitian (RD):  1. K+/mg++/phos all within normal range while on non renal base formula. Will continue with current semi-elemental tube feeds at this time ( Vital 1.5), decreased goal rate to 50 ml/hr to better meet estimated needs.     --Vital 1.5 @ goal of 50 ml/hr (1200 ml/day) to provide: 1800 kcals, 81 gm PRO, 916 ml free H2O, 224 gm CHO, and 7 gm soluble fiber daily.    2. Ordered to begin protein supplementation via FT (Prosource TF Free, vegan protein supplement), 1 packet BID. Provides additional 180 kcal and 30 gm protein.    -TF at goal infusion @ 50 ml/hr  + Prosource TF Free => 1980 kcal (29 kcal/kg) and 111 gm protein ( 1.6 gm/kg)     3. Will continue with Banatrol soluble fiber 1 packet TID via FT to bulk up stool.     Future/Additional Recommendations:  TF  Tolerance       EVALUATION OF THE PROGRESS TOWARD GOALS   Diet:   Regular    Nutrition Support: ( on TF since 6/30 admit)  Access: PEG-J, tube feed via Jejunostomy (s/p PEG-J placement on 7/13).    Dosing wt: lowest wt this admit 67.5 kg (8/12)    EN: Vital 1.5 Leno @ goal of 60 ml/hr  (1440 ml/day)  --Provision: 2160 kcals (32), 97 gm PRO (1.4), 1100 ml free H20, 269 gm CHO, 8 gm fiber and 82 gm fat daily.     Modules: Soluble Fiber packets, Banatrol plus one packet TID  ( 40 kcal per packet + 2 gm soluble fiber per packet).     FWF: 60 mL q 4 hours (8/6) + 30 mL Q 4 hours (7/13)     PERT: Creon 24, 2 capsules + bicarb solution, Q 4 hours via FT->  providing 3512 units lipase/total gm fat in tube feeds.      GI: Stool x 9 yesterday, liquid stool  daily noted ( On Banatrol plus, 1 packet TID via FT), patient is not on lactulose therapy, however antibioticus may be contributing to daily loose stool.       Intake:   Calorie count 8/15-8/17: insufficient data vs. Inadequate intake   EN:  Average 4 day of EN intake after G/J tube placement was 930 ml daily ( met 65% of the TF goal volume => provided patient with 1400 kcal/day ( 21 kcal/kg) and 63 gm protein (0.9 gm /kg/day).       Updated 8/19: ASSESSED NUTRITION NEEDS per 68 kg driest wt on 8/12 :  Estimated Energy Needs: 25-30 kcals/day, (9548-5249 kcals/kg)   Justification: hypermetabolism with pancreatitis/HD and liver disease   Estimated Protein Needs: 102 + g/day (1.5 ++g/kg)   Justification: pancreatitis and Hypercatabolism with critical illness / HD repletion   Estimated Fluid Needs: Per provider pending fluid status (ESLD/HD/volume up)      NEW FINDINGS   Chart reviewed:   ----Transferred from OSH after prolonged hospitalization with septic shock. Patient was admitted on 6/28/2021 with acute respiratory failure and septic shock.     --PMH: Alcoholic hepatitis c/b ESLD,  HE, ESRD requiring HD, and malnutrition in the setting of acute necrotizing pancreatitis, s/p PEG-J placement on 7/13.     --Admitted secondary bacterial peritonitis (treated)  --ESRD (HD dependent),   --HE,   --Necrotizing pancreatitis with infected pancreatic pseudocyst s/p RP Drain and Cystogastrostomy, and persistent right pleural effusion.   --Multiple Necrosectomies, last one on 8/11    WT trend:   Admit wt:80.2 kg (6/28/21)--> lowest wt this admit 67.5 kg (8/12)--> up to 77.1 kg most recent wt on 8/18.   EDW:N/A   Will continue to use dosing wt of 67.5 kg driest wt available.     Meds:  Sodium Bicarbonate/Creon, creon with meals, nephronex 5 ml daily per FT, folic acid, Protonix, Thiamine  Flagyl infusion 500 mg every 8 hours  Not on Lactulose!     Labs:   CRP: 81(8/7/21), WBC:12 (elevated), albumin: 1.4 (depleted)  Fecal elastase:  162 (L)    BUN: 22, Cr: 1.77 (H) -> On HD ->  (K+:4.3, Mg++:1.7, Phos:3.2)  Bicarb:26 (normal range)   Total bili: 1.6 (trending down slowly), INR: 1.86 (elevated)   B (elevated)       MALNUTRITION  % Intake: On TF's with frequent interruptions  % Weight Loss: Unable to assess due to volume status   Subcutaneous Fat Loss: Preciously global Severe   Muscle Loss: Previously global Severe   Fluid Accumulation/Edema: 1+ Generalized edema ( trace)  Malnutrition Diagnosis: Severe malnutrition in the context of acute on chronic illness       Previous Goals   Total avg nutritional intake to meet a minimum of 25 kcal/kg and 1.3 g PRO/kg daily (per dosing wt 67.5 kg).  Evaluation: Not met      Previous Nutrition Diagnosis  Inadequate protein-energy/ intake related to inadequate enteral nutrition infusion and inadequate oral intake as evidenced by enteral nutrition providing 62% of low end protein and 74% of low end energy needs and   Evaluation: No change    CURRENT NUTRITION DIAGNOSIS  Inadequate protein-energy intake related to interruption to TF support and inadequate oral intake as evidenced by average 5 days TF met 65% of goal TF volume with minimal po intake over the past 5 days      INTERVENTIONS  Implementation  Enteral Nutrition - decreased rate    Feeding tube flush - no change today   Modules: ordered additional protein supplementation    Goals  Total avg nutritional intake to meet a minimum of 25 kcal/kg and 1.5 gm PRO/kg daily (per dosing wt 68 kg driest wt this admit on ).    Monitoring/Evaluation  Progress toward goals will be monitored and evaluated per protocol.      Jesica Blunt RD/MARYELLEN  Pager [phone]

## 2021-08-18 NOTE — PROGRESS NOTES
Nephrology Progress Note  08/18/2021         Dax Villareal is a 30 year old male with h/o ETOH hepatitis, end stage liver disease, RADHA on HD, transferred from [facility] in [place] for septic shock on 6/28/21 for treatment of necrotizing pancreatitis and decompensated liver disease. He was initially admitted to [facility] 5/19/21.        Interval History :   Mr Villareal had necrosectomy for 4th time on 8/11 with no more anticipated, had CT 8/12 showing large pleural effusion on R side which is likely ascites fluid but he is refusing to have it tapped, has ongoing coughing likely related to this.  Plan for run tomorrow, pericardial drain capped and will see how this is tolerated.  Working on discharge to TCU, has been stable on 3x/week HD.        Assessment & Recommendations:   RADHA-Cr was 0.8 as recently as 5/19/2021 but now HD dependent, started RRT at [facility] prior to transfer to [facility] (exact date unclear but per family it was end of May).  Etiology likely multifactorial with contrast, sepsis/pancreatitis, likely HRS physiology contributing as well.  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  Continuing RRT as long as it is tolerated with regards to BP's, is functional enough to possibly do outpt HD.  MELD is in high 30's so has high 3 month mortality but continuing restorative cares for now, BP's have been stable enough to support HD.  Had GI stenting of pancreas x4.                 -Line is TDC from PTA               - HD on TTS schedule unless issues arise.      Volume status-Making small amounts of UOP, has large pleural effusion by CT which he is refusing to have tapped and IVC is dilated on ECHO although this is likely worsened due to effusion.  BP's stable but was tachycardic so pulled 1L of UF yesterday.                -likely needs another thoracentesis, refusing.      Electrolytes/pH-K 4.3, bicarb 22, no acute issues.       Ca/phos/pth-Ca 8.0, corrects to normal with low albumin.  Mg and  "Phos reasonable.       Anemia-Hgb low at 7.5, acute management per team, last PRBC's 7/14.  Started on EPO 5k with runs, iron sats 43%      Nutrition-Taking PO, appetite reasonable.       Seen and discussed with Dr Brown      Recommendations were communicated to primary team via note          SOFIA Marlow CNS  Clinical Nurse Specialist  [phone]    Review of Systems:   I reviewed the following systems:  Gen: No fevers or chills  CV: No CP at rest  Resp: No SOB at rest  GI: No N/V    Physical Exam:   I/O last 3 completed shifts:  In: 620 [I.V.:20; NG/GT:60]  Out: 1000 [Other:1000]   /50 (BP Location: Right arm)   Pulse 119   Temp 97.3  F (36.3  C) (Oral)   Resp 16   Ht 1.676 m (5' 6\")   Wt 77.5 kg (170 lb 13.7 oz)   SpO2 92%   BMI 27.58 kg/m       GENERAL APPEARANCE: Lying in bed, RR shallow and he is coughing frequently  EYES:  scleral icterus, pupils equal  Pulmonary: decreased BS   CV: tachy   - Edema - no LE edema  GI: mild distention, non-tender. Has pancreatic drain  MS: no evidence of inflammation in joints, no muscle tenderness  SKIN: no rash, warm, dry, no cyanosis  NEURO: alert/interactive  ACCESS: Right TDC    Labs:   All labs reviewed by me  Electrolytes/Renal - Recent Labs   Lab Test 08/18/21  0554 08/17/21  0730 08/16/21  0533   * 130* 132*   POTASSIUM 4.3 4.4 4.2   CHLORIDE 100 97 98   CO2 26 24 24   BUN 22 39* 28   CR 1.77* 2.82* 2.32*   * 151* 136*   JANENE 8.0* 8.1* 8.1*   MAG 1.7 1.8 1.6   PHOS 3.2 5.2* 4.1       CBC -   Recent Labs   Lab Test 08/18/21  0554 08/17/21  0730 08/16/21  0533   WBC 12.0* 13.6* 13.1*   HGB 7.5* 7.7* 7.8*    317 289       LFTs -   Recent Labs   Lab Test 08/18/21  0554 08/17/21  0730 08/16/21  0533   ALKPHOS 112 106 113   BILITOTAL 1.6* 1.8* 1.8*   ALT 20 22 22   AST 40 40 45   PROTTOTAL 6.8 6.6* 6.8   ALBUMIN 1.4* 1.4* 1.4*       Iron Panel -   Recent Labs   Lab Test 08/14/21 2055 07/19/21  0632   IRON 28* 31*   IRONSAT 21 43 "   JERRELL 2,186*  --            Current Medications:    sodium bicarbonate  325 mg Per Feeding Tube Q4H    And     amylase-lipase-protease  1-2 capsule Per Feeding Tube Q4H     amylase-lipase-protease  2 capsule Oral TID w/meals     B and C vitamin Complex with folic acid  5 mL Per Feeding Tube Daily     banatrol plus  1 packet Per Feeding Tube TID     ceFEPIme (MAXIPIME) IV  1 g Intravenous Q24H     folic acid  1 mg Oral Daily     [Held by provider] heparin ANTICOAGULANT  5,000 Units Subcutaneous Q12H     HYDROmorphone (STANDARD CONC)  2 mg Oral Q8H     melatonin  6 mg Oral At Bedtime     menthol   Transdermal Q8H     metroNIDAZOLE  500 mg Intravenous Q8H     sodium chloride (PF)  3 mL Intracatheter Q8H     sodium chloride (PF)  73 mL Intravenous Once     vitamin B1  100 mg Oral Daily    Or     thiamine  100 mg Intravenous Daily       - MEDICATION INSTRUCTIONS -       dextrose

## 2021-08-18 NOTE — PLAN OF CARE
Heart rate 120,  sepsis triggered.  Lactic acid 1.3.  Other vitals stable.  Refused dressing changed to drain and PEG.  Complains of generalized pain.  Tylenol and dilaudid given for pain management with fair relief.  Repositions self but prefers to stay on his right side and refused to turn to his left side.  Tube feeding running at 60cc/hr,  tolerating well.

## 2021-08-18 NOTE — PLAN OF CARE
"Assumed cares 2300 - 0700    /50 (BP Location: Right arm)   Pulse 119   Temp 97.3  F (36.3  C) (Oral)   Resp 16   Ht 1.676 m (5' 6\")   Wt 77.5 kg (170 lb 13.7 oz)   SpO2 97%   BMI 27.58 kg/m      A&Ox4. VSS on 4 L NC ex tachycardic. Reported pain in elbow and back controlled with scheduled dilaudid. Dyspnea on exertion, uses bedside yonker for oral secretions. Refuses to keep pulse ox on. Denied N/V. J tube TF @ goal rate of 60 ml/hr. G tube to gravity. Regular diet. L peritoneal drain clamped. Voiding with bedside urinal, tea colored urine. x1 loose incontinent BM. Intermittently refuses repositioning. L PICC TKO for intermittent ABX and saline locked.   "

## 2021-08-19 LAB
ALBUMIN SERPL-MCNC: 1.4 G/DL (ref 3.4–5)
ALP SERPL-CCNC: 111 U/L (ref 40–150)
ALT SERPL W P-5'-P-CCNC: 20 U/L (ref 0–70)
ANION GAP SERPL CALCULATED.3IONS-SCNC: 9 MMOL/L (ref 3–14)
AST SERPL W P-5'-P-CCNC: 39 U/L (ref 0–45)
BILIRUB SERPL-MCNC: 1.6 MG/DL (ref 0.2–1.3)
BUN SERPL-MCNC: 34 MG/DL (ref 7–30)
CALCIUM SERPL-MCNC: 8.1 MG/DL (ref 8.5–10.1)
CHLORIDE BLD-SCNC: 98 MMOL/L (ref 94–109)
CO2 SERPL-SCNC: 24 MMOL/L (ref 20–32)
CREAT SERPL-MCNC: 2.34 MG/DL (ref 0.66–1.25)
ERYTHROCYTE [DISTWIDTH] IN BLOOD BY AUTOMATED COUNT: 17.2 % (ref 10–15)
GFR SERPL CREATININE-BSD FRML MDRD: 36 ML/MIN/1.73M2
GLUCOSE BLD-MCNC: 135 MG/DL (ref 70–99)
HCT VFR BLD AUTO: 23 % (ref 40–53)
HGB BLD-MCNC: 7.4 G/DL (ref 13.3–17.7)
LACTATE SERPL-SCNC: 2 MMOL/L (ref 0.7–2)
MAGNESIUM SERPL-MCNC: 1.7 MG/DL (ref 1.6–2.3)
MCH RBC QN AUTO: 29.7 PG (ref 26.5–33)
MCHC RBC AUTO-ENTMCNC: 32.2 G/DL (ref 31.5–36.5)
MCV RBC AUTO: 92 FL (ref 78–100)
PHOSPHATE SERPL-MCNC: 4.3 MG/DL (ref 2.5–4.5)
PLATELET # BLD AUTO: 328 10E3/UL (ref 150–450)
POTASSIUM BLD-SCNC: 4.6 MMOL/L (ref 3.4–5.3)
PROT SERPL-MCNC: 6.9 G/DL (ref 6.8–8.8)
RBC # BLD AUTO: 2.49 10E6/UL (ref 4.4–5.9)
SODIUM SERPL-SCNC: 131 MMOL/L (ref 133–144)
T4 FREE SERPL-MCNC: 1.18 NG/DL (ref 0.76–1.46)
TSH SERPL DL<=0.005 MIU/L-ACNC: 5.48 MU/L (ref 0.4–4)
WBC # BLD AUTO: 11.7 10E3/UL (ref 4–11)

## 2021-08-19 PROCEDURE — 120N000002 HC R&B MED SURG/OB UMMC

## 2021-08-19 PROCEDURE — 999N000044 HC STATISTIC CVC DRESSING CHANGE

## 2021-08-19 PROCEDURE — 250N000011 HC RX IP 250 OP 636

## 2021-08-19 PROCEDURE — 258N000003 HC RX IP 258 OP 636: Performed by: CLINICAL NURSE SPECIALIST

## 2021-08-19 PROCEDURE — 85027 COMPLETE CBC AUTOMATED: CPT | Performed by: INTERNAL MEDICINE

## 2021-08-19 PROCEDURE — G0463 HOSPITAL OUTPT CLINIC VISIT: HCPCS

## 2021-08-19 PROCEDURE — 84439 ASSAY OF FREE THYROXINE: CPT | Performed by: STUDENT IN AN ORGANIZED HEALTH CARE EDUCATION/TRAINING PROGRAM

## 2021-08-19 PROCEDURE — 250N000011 HC RX IP 250 OP 636: Performed by: CLINICAL NURSE SPECIALIST

## 2021-08-19 PROCEDURE — 99233 SBSQ HOSP IP/OBS HIGH 50: CPT | Performed by: CLINICAL NURSE SPECIALIST

## 2021-08-19 PROCEDURE — 250N000011 HC RX IP 250 OP 636: Performed by: STUDENT IN AN ORGANIZED HEALTH CARE EDUCATION/TRAINING PROGRAM

## 2021-08-19 PROCEDURE — 250N000013 HC RX MED GY IP 250 OP 250 PS 637: Performed by: STUDENT IN AN ORGANIZED HEALTH CARE EDUCATION/TRAINING PROGRAM

## 2021-08-19 PROCEDURE — 84443 ASSAY THYROID STIM HORMONE: CPT | Performed by: STUDENT IN AN ORGANIZED HEALTH CARE EDUCATION/TRAINING PROGRAM

## 2021-08-19 PROCEDURE — 36592 COLLECT BLOOD FROM PICC: CPT | Performed by: STUDENT IN AN ORGANIZED HEALTH CARE EDUCATION/TRAINING PROGRAM

## 2021-08-19 PROCEDURE — 250N000013 HC RX MED GY IP 250 OP 250 PS 637: Performed by: INTERNAL MEDICINE

## 2021-08-19 PROCEDURE — 634N000001 HC RX 634: Performed by: CLINICAL NURSE SPECIALIST

## 2021-08-19 PROCEDURE — 90937 HEMODIALYSIS REPEATED EVAL: CPT

## 2021-08-19 PROCEDURE — 84100 ASSAY OF PHOSPHORUS: CPT | Performed by: INTERNAL MEDICINE

## 2021-08-19 PROCEDURE — 99207 PR CDG-CUT & PASTE-POTENTIAL IMPACT ON LEVEL: CPT | Performed by: STUDENT IN AN ORGANIZED HEALTH CARE EDUCATION/TRAINING PROGRAM

## 2021-08-19 PROCEDURE — 99233 SBSQ HOSP IP/OBS HIGH 50: CPT | Mod: GC | Performed by: STUDENT IN AN ORGANIZED HEALTH CARE EDUCATION/TRAINING PROGRAM

## 2021-08-19 PROCEDURE — 83735 ASSAY OF MAGNESIUM: CPT | Performed by: INTERNAL MEDICINE

## 2021-08-19 PROCEDURE — 83605 ASSAY OF LACTIC ACID: CPT | Performed by: STUDENT IN AN ORGANIZED HEALTH CARE EDUCATION/TRAINING PROGRAM

## 2021-08-19 PROCEDURE — 80053 COMPREHEN METABOLIC PANEL: CPT | Performed by: INTERNAL MEDICINE

## 2021-08-19 PROCEDURE — 36592 COLLECT BLOOD FROM PICC: CPT | Performed by: INTERNAL MEDICINE

## 2021-08-19 RX ORDER — HEPARIN SODIUM 1000 [USP'U]/ML
500 INJECTION, SOLUTION INTRAVENOUS; SUBCUTANEOUS CONTINUOUS
Status: DISCONTINUED | OUTPATIENT
Start: 2021-08-19 | End: 2021-08-19

## 2021-08-19 RX ORDER — HEPARIN SODIUM 1000 [USP'U]/ML
500 INJECTION, SOLUTION INTRAVENOUS; SUBCUTANEOUS
Status: COMPLETED | OUTPATIENT
Start: 2021-08-19 | End: 2021-08-19

## 2021-08-19 RX ORDER — TRANSGALACTOOLIGOSACCHARIDES 2.75 G
1 POWDER IN PACKET (EA) ORAL 2 TIMES DAILY
Status: DISCONTINUED | OUTPATIENT
Start: 2021-08-19 | End: 2021-08-31

## 2021-08-19 RX ADMIN — SODIUM BICARBONATE 325 MG: 325 TABLET ORAL at 22:10

## 2021-08-19 RX ADMIN — HEPARIN SODIUM 500 UNITS/HR: 1000 INJECTION INTRAVENOUS; SUBCUTANEOUS at 14:32

## 2021-08-19 RX ADMIN — PANCRELIPASE 2 CAPSULE: 24000; 76000; 120000 CAPSULE, DELAYED RELEASE PELLETS ORAL at 22:09

## 2021-08-19 RX ADMIN — OXYCODONE HYDROCHLORIDE 5 MG: 5 SOLUTION ORAL at 19:27

## 2021-08-19 RX ADMIN — HYDROMORPHONE HYDROCHLORIDE 1 MG: 1 SOLUTION ORAL at 08:52

## 2021-08-19 RX ADMIN — SODIUM BICARBONATE 325 MG: 325 TABLET ORAL at 04:39

## 2021-08-19 RX ADMIN — ALTEPLASE 2 MG: 2.2 INJECTION, POWDER, LYOPHILIZED, FOR SOLUTION INTRAVENOUS at 23:15

## 2021-08-19 RX ADMIN — PANCRELIPASE 2 CAPSULE: 24000; 76000; 120000 CAPSULE, DELAYED RELEASE PELLETS ORAL at 04:39

## 2021-08-19 RX ADMIN — PANCRELIPASE 2 CAPSULE: 24000; 76000; 120000 CAPSULE, DELAYED RELEASE PELLETS ORAL at 08:27

## 2021-08-19 RX ADMIN — EPOETIN ALFA-EPBX 5000 UNITS: 10000 INJECTION, SOLUTION INTRAVENOUS; SUBCUTANEOUS at 14:46

## 2021-08-19 RX ADMIN — SODIUM CHLORIDE 250 ML: 9 INJECTION, SOLUTION INTRAVENOUS at 14:32

## 2021-08-19 RX ADMIN — THIAMINE HCL TAB 100 MG 100 MG: 100 TAB at 08:27

## 2021-08-19 RX ADMIN — METRONIDAZOLE 500 MG: 500 INJECTION, SOLUTION INTRAVENOUS at 04:25

## 2021-08-19 RX ADMIN — PANCRELIPASE 2 CAPSULE: 24000; 76000; 120000 CAPSULE, DELAYED RELEASE PELLETS ORAL at 18:26

## 2021-08-19 RX ADMIN — Medication 1 PACKET: at 08:28

## 2021-08-19 RX ADMIN — HYDROMORPHONE HYDROCHLORIDE 1 MG: 1 SOLUTION ORAL at 03:16

## 2021-08-19 RX ADMIN — METRONIDAZOLE 500 MG: 500 INJECTION, SOLUTION INTRAVENOUS at 19:28

## 2021-08-19 RX ADMIN — SODIUM BICARBONATE 325 MG: 325 TABLET ORAL at 11:52

## 2021-08-19 RX ADMIN — HYDROMORPHONE HYDROCHLORIDE 1 MG: 1 SOLUTION ORAL at 13:14

## 2021-08-19 RX ADMIN — ALTEPLASE 2 MG: 2.2 INJECTION, POWDER, LYOPHILIZED, FOR SOLUTION INTRAVENOUS at 22:04

## 2021-08-19 RX ADMIN — Medication 5 ML: at 08:27

## 2021-08-19 RX ADMIN — MELATONIN TAB 3 MG 6 MG: 3 TAB at 22:00

## 2021-08-19 RX ADMIN — Medication 1 PACKET: at 19:28

## 2021-08-19 RX ADMIN — PANCRELIPASE 2 CAPSULE: 24000; 76000; 120000 CAPSULE, DELAYED RELEASE PELLETS ORAL at 11:52

## 2021-08-19 RX ADMIN — METRONIDAZOLE 500 MG: 500 INJECTION, SOLUTION INTRAVENOUS at 11:53

## 2021-08-19 RX ADMIN — SODIUM BICARBONATE 325 MG: 325 TABLET ORAL at 18:25

## 2021-08-19 RX ADMIN — OXYCODONE HYDROCHLORIDE 5 MG: 5 SOLUTION ORAL at 04:40

## 2021-08-19 RX ADMIN — SODIUM BICARBONATE 325 MG: 325 TABLET ORAL at 00:54

## 2021-08-19 RX ADMIN — OXYCODONE HYDROCHLORIDE 5 MG: 5 SOLUTION ORAL at 11:53

## 2021-08-19 RX ADMIN — FOLIC ACID 1 MG: 1 TABLET ORAL at 08:27

## 2021-08-19 RX ADMIN — SODIUM CHLORIDE 300 ML: 9 INJECTION, SOLUTION INTRAVENOUS at 14:32

## 2021-08-19 RX ADMIN — HEPARIN SODIUM 500 UNITS: 1000 INJECTION INTRAVENOUS; SUBCUTANEOUS at 13:29

## 2021-08-19 RX ADMIN — CEFEPIME HYDROCHLORIDE 1 G: 1 INJECTION, POWDER, FOR SOLUTION INTRAMUSCULAR; INTRAVENOUS at 21:07

## 2021-08-19 RX ADMIN — SODIUM BICARBONATE 325 MG: 325 TABLET ORAL at 08:27

## 2021-08-19 RX ADMIN — PANCRELIPASE 2 CAPSULE: 24000; 76000; 120000 CAPSULE, DELAYED RELEASE PELLETS ORAL at 00:53

## 2021-08-19 RX ADMIN — HYDROMORPHONE HYDROCHLORIDE 1 MG: 1 SOLUTION ORAL at 22:00

## 2021-08-19 ASSESSMENT — ACTIVITIES OF DAILY LIVING (ADL)
ADLS_ACUITY_SCORE: 17

## 2021-08-19 ASSESSMENT — MIFFLIN-ST. JEOR
SCORE: 1672.75
SCORE: 1657.75

## 2021-08-19 NOTE — PROGRESS NOTES
Nephrology Progress Note  08/19/2021         Dax Villareal is a 30 year old male with h/o ETOH hepatitis, end stage liver disease, RADHA on HD, transferred from [facility] in [place] for septic shock on 6/28/21 for treatment of necrotizing pancreatitis and decompensated liver disease. He was initially admitted to [facility] 5/19/21.        Interval History :   Mr Villareal had necrosectomy for 4th time on 8/11 with no more anticipated, had CT 8/12 showing large pleural effusion on R side which is likely ascites fluid but he is refusing to have it tapped.  Seen on HD today, no signs of recovery in chemistries but has been stable on 3x/week dialysis.  Pancreatic drain capped, will see how he tolerates and plan for re-imaging.        Assessment & Recommendations:   RADHA-Cr was 0.8 as recently as 5/19/2021 but now HD dependent, started RRT at [facility] prior to transfer to [facility] (exact date unclear but per family it was end of May).  Etiology likely multifactorial with contrast, sepsis/pancreatitis, likely HRS physiology contributing as well.  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  Continuing RRT as long as it is tolerated with regards to BP's, is functional enough to possibly do outpt HD.  MELD is in high 30's so has high 3 month mortality but continuing restorative cares for now, BP's have been stable enough to support HD.  Had GI stenting of pancreas x4.                 -Line is TDC from PTA               - HD on TTS schedule unless issues arise.      Volume status-Making small amounts of UOP, has large pleural effusion by CT which he is refusing to have tapped and IVC is dilated on ECHO although this is likely worsened due to effusion.  BP's stable but was tachycardic so pulled 1L of UF yesterday.                -likely needs another thoracentesis, refusing.      Electrolytes/pH-K 4.6, bicarb 24, no acute issues.       Ca/phos/pth-Ca 8.1, corrects to normal with low albumin.  Mg and  "Phos reasonable.       Anemia-Hgb low at 7.4, acute management per team, last PRBC's 7/14.  Started on EPO 5k with runs, iron sats 43%      Nutrition-Taking PO, appetite reasonable.       Seen and discussed with Dr Brown      Recommendations were communicated to primary team via note        SOFIA Marlow CNS  Clinical Nurse Specialist  [phone]    Review of Systems:   I reviewed the following systems:  Gen: No fevers or chills  CV: No CP at rest  Resp: No SOB at rest  GI: No N/V    Physical Exam:   I/O last 3 completed shifts:  In: 1320 [P.O.:120; NG/GT:300]  Out: 102 [Emesis/NG output:102]   /69   Pulse (!) 122   Temp 97.9  F (36.6  C) (Oral)   Resp 15   Ht 1.676 m (5' 6\")   Wt 77.5 kg (170 lb 13.7 oz)   SpO2 98%   BMI 27.58 kg/m       GENERAL APPEARANCE: Lying in bed, RR shallow and he is coughing frequently  EYES:  scleral icterus, pupils equal  Pulmonary: decreased BS   CV: tachy   - Edema - no LE edema  GI: mild distention, non-tender. Has pancreatic drain  MS: no evidence of inflammation in joints, no muscle tenderness  SKIN: no rash, warm, dry, no cyanosis  NEURO: alert/interactive  ACCESS: Right TDC    Labs:   All labs reviewed by me  Electrolytes/Renal - Recent Labs   Lab Test 08/19/21  0647 08/18/21  0554 08/17/21  0730   * 132* 130*   POTASSIUM 4.6 4.3 4.4   CHLORIDE 98 100 97   CO2 24 26 24   BUN 34* 22 39*   CR 2.34* 1.77* 2.82*   * 135* 151*   JANENE 8.1* 8.0* 8.1*   MAG 1.7 1.7 1.8   PHOS 4.3 3.2 5.2*       CBC -   Recent Labs   Lab Test 08/19/21  0647 08/18/21  0554 08/17/21  0730   WBC 11.7* 12.0* 13.6*   HGB 7.4* 7.5* 7.7*    303 317       LFTs -   Recent Labs   Lab Test 08/19/21  0647 08/18/21  0554 08/17/21  0730   ALKPHOS 111 112 106   BILITOTAL 1.6* 1.6* 1.8*   ALT 20 20 22   AST 39 40 40   PROTTOTAL 6.9 6.8 6.6*   ALBUMIN 1.4* 1.4* 1.4*       Iron Panel -   Recent Labs   Lab Test 08/14/21 2055 07/19/21  0632   IRON 28* 31*   IRONSAT 21 43   JERRELL " 2,186*  --            Current Medications:    sodium bicarbonate  325 mg Per Feeding Tube Q4H    And     amylase-lipase-protease  1-2 capsule Per Feeding Tube Q4H     amylase-lipase-protease  2 capsule Oral TID w/meals     B and C vitamin Complex with folic acid  5 mL Per Feeding Tube Daily     banatrol plus  1 packet Per Feeding Tube TID     ceFEPIme (MAXIPIME) IV  1 g Intravenous Q24H     folic acid  1 mg Oral Daily     [Held by provider] heparin ANTICOAGULANT  5,000 Units Subcutaneous Q12H     melatonin  6 mg Oral At Bedtime     menthol   Transdermal Q8H     metroNIDAZOLE  500 mg Intravenous Q8H     oxyCODONE  5 mg Oral Q8H     protein modular  1 packet Per Feeding Tube BID     sodium chloride (PF)  3 mL Intracatheter Q8H     sodium chloride (PF)  73 mL Intravenous Once     vitamin B1  100 mg Oral Daily    Or     thiamine  100 mg Intravenous Daily       - MEDICATION INSTRUCTIONS -       dextrose       heparin (porcine) 500 Units/hr (08/19/21 1432)

## 2021-08-19 NOTE — PLAN OF CARE
Assumed care 15 t0 19:00    Patient A & O X 4 , VSS exp tachy no respiratory distress noted sat 94% via 1L NC.  Patient c/o back pain and generalized , given Dilaudid PO x 1 and Tylenol via PEG tube . C/o nausea Zofran 4mg iv x 1 . Post assessment with relief and no emesis report.  Tube feeding at 60 ml/hr and tolerated well. Patient like to lay on right side and declined to be reposition writer educated patient the importance to prevent pressure sore. Call light within reach bed alarm on. Continue monitor closely and update MD with change.

## 2021-08-19 NOTE — PROGRESS NOTES
Provider notified (name): Thierno Wynn MD  Reason for notification: New cough, crackles heard  Recommendation/request given to provider: Assess patient  Response from provider: waiting for call back.

## 2021-08-19 NOTE — PROGRESS NOTES
HEMODIALYSIS TREATMENT NOTE    Date: 8/19/2021  Time: 5:10 PM    Data:  Pre Wt: 77.5 kg (170 lb 13.7 oz)   Desired Wt: 75.5 kg   Post Wt: 76 kg (167 lb 8.8 oz)  Weight change: 1.5 kg  Ultrafiltration - Post Run Net Total Removed (mL): 1500 mL  Vascular Access Status: CVC  patent  Dialyzer Rinse: Clear  Total Blood Volume Processed: 69.81 L   Total Dialysis (Treatment) Time: 3   Dialysate Bath: K 2, Ca 3  Heparin 500 units loading + 500 units/hr    Lab:   No    Interventions:  Pt had a stable uncomplicated 3 hours of HD. 1.5L of fluid was pulled. Epogen administered as ordered. During tx, pt c/o SOB, and was placed on 4L 02 via NC per his request effectively. Ending BP was 103/54. Pt rinsed back post tx, lumen were saline locked, caps end changed, and hand off report given to PCN.    Assessment:  -Calm & Cooperative with a flat affect  -VSS  -A & O X 4     Plan:    Per renal

## 2021-08-19 NOTE — PLAN OF CARE
"Assumed cares [id number]     /75   Pulse 120   Temp 97.9  F (36.6  C) (Oral)   Resp 18   Ht 1.676 m (5' 6\")   Wt 77.5 kg (170 lb 13.7 oz)   SpO2 98%   BMI 27.58 kg/m       Pain: Patient has generalized pain \"everywhere\" pre patient. Given scheduled oxy, given PRN dilaudid.    Neuro:  A&Ox4.   Respiratory: Lungs diminished in all lobes, dyspnea on exertion. Non-productive cough.  On 2L NC.   Cardiac/Neurovascular: HR tachycardic. No numbness or tingling reported. Mild edema in lower extremities.   GI/: Oliguria, on hemodialysis. Patient is intermittently incontinent of stool. Multiple loose BM's today.   Nutrition: regular diet. TF running at 60ml/hr decreased to 50ml/hr. Regular diet, with no intake (no appetite).   Activity: Bed bound. Intermittently refusing re postioning.   Skin: Blanchable red sacral area. Barrier cream applied. Dry and peeling feet.   Lines: Left PICC (triple lumen). Infusing TKO. GJ tube. J tube to  tube feeds. G to drainage bag. Peritoneal tube left flank (clamped).   Events this shift: Patient refused to eat breakfast and lunch today, states he has no appetite.  Pt also refusing head of bed above 30 degrees, educated about importance with TF. Pt still refusing. MD paged and made aware. Patient went down to dialysis around 1400     Plan: Continue with plan of care.      "

## 2021-08-19 NOTE — PLAN OF CARE
Time: 1900-0730    VS: /58 (BP Location: Left arm)   Pulse (!) 121   Temp 98.1  F (36.7  C) (Oral)   Resp 16  SpO2 94%  on 2 L   Activity: Assist of 2.  Neuros: A & O x 4. Denies numbness and tingling.   Cardiac:  Tachycardic. Denies chest pain.  Respiratory: 2 L O2. Shortness of breath with exertion.   GI/: 1 episode of emesis measured 2 ml this shift. Denies nausea. Receiving tube feeding 60 ml/hr. Loose stool x 4 this shift.  Diet: Regular diet   Skin: Scaly and peeling.   Lines: L triple lumen PICC. Red line infusing. Unable to flush grey and white, no blood return noted.  Right CVC.     New changes this shift/Plan: Noticeable change in lung sounds on the right with new cough. Cough not persistent. MD notified. Previous xray indicates that diminished lung sounds are an expected finding.     Will continue to monitor & follow POC.

## 2021-08-19 NOTE — PROGRESS NOTES
[facility]  Progress Note - [place] 3 Service        Date of Admission:  6/28/2021    Assessment & Plan   Dax Villareal is a 30 year old male with hx of alcohol use disorder admitted on 6/28/2021 after recent prolonged admission at [facility] in [place] who has severe acute alcoholic hepatitis c/b HE, ESRD requiring HD, and malnutrition in the setting of acute necrotizing pancreatitis, s/p PEG-J placement on 7/13. He arrived with acute respiratory failure and septic shock that have resolved, and his secondary bacterial peritonitis has been appropriately treated. He requires continued management for ESRD, hepatic encephalopathy, infected pancreatic pseudocyst, nutritional support via PEG-J, and persistent right pleural effusion. Clinically improving after multiple necrosectomies.     Today:  - Dialysis today  - Discussed with IR, no clinical utitlity in getting CT Ab/Pelvis prior to 8/23  - RP drain to remain capped with Abx until removed  - Plan for para/thora tomorrow    # Necrotizing alcoholic pancreatitis w/ infected pseudocyst s/p RP Drain and Cystogastrostomy  # Septic Shock, resolved   # Leukocytosis    Admitted to the [facility] ICU on 6/28/2021 from [facility] in [place] for septic shock due to necrotizing pancreatitis. CT on admit showing mature collection with enhancing wall measuring ~[id number]. IR placed perc drain 6/29. Repeat imaging 7/18 revealed walled off necrotic collection amenable to endoscopic transluminal drainage with cystgastrostomy stenting with necrosectomy on 7/21/21.  7/28 - EGD, stent replacement (transluminal Axios with coaxial Solus), PEG-J removal (buried bumper) and replacement of new PEG-J with one new T-tag. Repeat necrosectomy and Axios removal/replacement Friday 8/6. Repeat CT on 8/9. 8/11 - EGD with completion necrosectomy, no e/o bowel fistula, no plans for further necrosectomy unless further pain/fevers warrants further evaluation.  RP drain capped with plans for reimaging on 8/23/2021     - Continue Cefepime 1 gm Q 24 + flagyl, previously recommended to continue until removal of RP drain  - Daily CBC, watching fever curve  - GI following, appreciate recs              - Pancreatic enzymes   - Flush drain with 100ml q4hr per GI   - Blood cultures and fluid cultures NGTD    # Secondary Bacterial Peritonitis 2/2 infected pseudocyst  # Peripancreatic fluid collection growing VRE   7/2 - Diagnostic paracentesis (WBC 2663 - 74% PMN, SAAG <1.1, protein 4.1)     Antibiotics:   Current: Cefepime 1 gm Q 24 + flagyl 500 q8 (8/6-present)     Past:  - Linezolid 7/21-7/30  - Vancomcyin 6/29  - Meropenem 6/29-7/18  - Micafungin 6/29-7/1  - Cipro 7/19-7/21  - Ceftriaxone 7/21-7/24; 7/26-7/27  - Zosyn 7/27-8/6    - Paracentesis PRN      # Right hepatic/pancreatic hydrothorax  Intermittently increased subjective shortness of breath. CXR on 7/19 w/ increased RLL haziness; ddx includes pleural effusion v pneumonia v atelectasis. CT on 7/28 showed large volume R hepatic hydrothorax with complete atelectasis of right lung, bedside thora completed 7/28 with labs and cultures.     CXR 7/30 showed moderate R pleural effusion, unchanged from previous and improved aeration of right lung with small remaining pleural effusion on CT. Thora cultures negative. Repeat Thora on 8/9 showing exudative fluid     - IR completed thoracentesis 7/28 - exudative  - ID recommended continued empiric abx, though pleural effusion most likely 2/2 hepatic hydrothorax and pancreatitis. Defer plan for repeat thoracentesis based on symptoms and oxygen requirement. No plan for chest tube given concern for infectious nidus  - Supplemental oxygen as needed to keep sats above 89%  - Repeat Thoracentesis PRN for dyspnea    #Hepatic Abscess  CT Ab/pelvis from 8/13 with developing hepatic abscess vs. infarct. IR consulted. No intervention for abscesses <5 cm and appears more likely infarct rather than  "abscess per IR. Risk of procedural intervention outweighs benefit at this point.   - Will reassess with future imaging on 823.  - Blood cultures from 8/13 NGTD  - Para & thora differed per GI  - Continuing current antibiotics     #Hematochezia, resolved  Patient CTA-Chest on 8/8 with \"possible\" non-obstructive PE in KALYAN Pulmonary Artery Branch. Patient has largely been bed bound and not on DVT prophylaxis for several days, especially in setting of recent procedures. Troponin negative x2. BRBPR with heparin. Ultrasounds negative for DVT.  - CTA-Chest on 8/10 negative for PE, no indication for anticuagulation  - Hematology recommends starting prophylactic dose anticoagulation once stable x1 week, stepping up to unfractionated heparin, likely will need 3 months of anticoagulation given hypercoagulable state of necrotizing pancreatitis   - Heme to be contacted prior to discharge when ready to transition to orals    # Lactic acidosis  Multifactorial d/t decreased lactic clearance in the setting of acute hepatic failure, ESRD, and acute on chronic infection.  - Continue to trend and assess vitals     # Gastric Outlet Obstruction  High G tube output due to the cystogastric connection/fluid drainage noted on 8/5. Per GI, possibly related to gastric outlet from severe abdominal inflammation and expected. KUB negative for obstructive gas pattern. Ileus from narcotics considered, but output not consistent with tube feeds. Improved after cystogastrectomy  -Anti-emetics  -Will monitor and continue to discuss with GI  -Having fluid removal per nephrology due to HD    #Hypokalemia, resolved  High GI output state. Replete as needed with ESRD noted. Improved with decreased G-tube drainage after cystogastrectomy.    # Severe malnutrition in setting of chronic illness  PEG-J placed 7/13. Patient intermittently refusing tube feeds d/t nausea/vomiting. Improving oral intake s/p necresectomies.  - Dietitian consult for new tube feeds, " appreciate recs   - Continuous TF per dietician order with goal of 65 ml/hr              - Cont panc enzymes   - Will trial regular diet with level 0 liquids as below  - Regular Diet, thin liquids per SLP  - VFSS for 8/10 to evaluate for dysphagia - cleared for level 0 liquids though vomited following  - Continue multivitamins  - Pantoprazole 40 mg IV daily  - Zofran PRN    Malnutrition:    - Level of malnutrition: Severe   - Based on: moderate/severe subcutaneous fat loss, moderate/severe muscle loss     # Alcoholic hepatitis complicated by portal hypertension, ascites, and HE - improving   # Hx of Alcohol use disorder c/b withdrawal   # Anemia  # Coagulopathy   Patient with a history of marked alcohol use and alcoholic hepatitis in the past. Was given a course of steroids (1 week) during previous admission at OSH. Patient mental status has been stable, alert and oriented x4 after rifaxamin and aggressive lactulose. Not a liver transplant candidate.   - Lactulose 20g daily and PRN and rifaximin 550mg BID - as patient allows  - Daily folic acid and thiamine  - Delirium precautions  - Melatonin 6mg at bedtime  - Daily CBC   - Transfuse if hemoglobin < 7, platelet < 10 or < 30 with bleeding  - Continue to monitor for signs of bleeding      # Stage III RADHA on HD due to ATN  # ESRD due to ATN  # Hypervolemia causing acute respiratory failure, resolved  Nephrology note 7/21 indicates that patient has been on renal replacement therapy for two months and pt status has most likely transitioned from ARF to ESRD at this point.      > Hemodialysis as per renal reccs - TTS              - Midodrine 10 mg PRN before dialysis sessions     > Daily chemistry labs, strict I/O, and daily weights  - Aspiration precautions  - Supplemental oxygen as needed to keep sats above 89%    # Anemia  Baseline hgb 17. Running between 6-8 in setting of chronic illness, new ESRD with epocrit per Nephrology, frequent lab draws and procedures.  - Goal  hgb >7.0, trend Hgb    # Intermittent lactic acidosis  Suspect due to poor lactate clearance due to ESRD and ESLD + ongoing inflammation/infection. No fluids given pt is on dialysis; however, did receive Albumin last night.    # Apathy  Patient is intermittently refusing cares due to wanting a 'break,' but also reaffirms that he wants everything done to prolong his life. Mood is depressed, apathetic at times. Patient has multiple stressors including his own medical conditions and family health issues.  - Continue to offer emotional support services such as psychiatry, palliative,   - He declined psychiatry/psychology visits     # Goals of Care  Poor prognosis with multiorgan failure and non-compliance with treatment team. Palliative care and care conference on 7/19. Patient not participatory. Prognosis is gaurded with long course to rehab  - Hepatology confirmed pt will not be a transplant candidate  - Daily phone updates to sister Noni ([phone])       Diet: Adult Formula Drip Feeding: Continuous Vital 1.5; Jejunostomy; Goal Rate: 60; mL/hr; Medication - Feeding Tube Flush Frequency: At least 15-30 mL water before and after medication administration and with tube clogging; Amount to Send (Nutrition us...  Regular Diet Adult    DVT Prophylaxis: Pneumatic Compression Devices  Rdz Catheter: Not present  Fluids: 10 mL NS TKO  Central Lines: PRESENT  PICC Triple Lumen 06/28/21 Left-Site Assessment: WDL  CVC Double Lumen Right-Site Assessment: WDL  Code Status: Full Code      Disposition Plan   Expected discharge: >7 days, recommended to transitional care unit once  nutrition improved, infected pancreatic pseudocyst is well managed and plan for dialysis is in place.       Johny Ortiz MD  [phone]  [address]  Securely message with the Vocera Web Console (learn more here)  Text page via [employer] Paging/Directory  Please see sign in/sign out for up  to date coverage information  _____________________________________________________________________    Interval History    Patient sitting in chair today. Ongoing tube feeds and loose stools. Pain tolerated.    Physical Exam   Vital Signs: Temp: 98.1  F (36.7  C) Temp src: Oral BP: 112/58 Pulse: (!) 121   Resp: 16 SpO2: 94 % O2 Device: Nasal cannula Oxygen Delivery: 2 LPM  Weight: 169 lbs 15.59 oz  Constitutional: Awake, non-diaphoretic, conversational regarding upcoming surgery plans  HEENT: Normocephalic. Atraumatic. No scleral icterus.  Cardiovascular: tachycardic, tender chest to palpation diffusely  Respiratory: Tachypnic, Decreased breath sounds on right, NC on for comfort  Abdomen: Tolerates palpation with minimal pain, PEG-tube site clean, RUQ tenderness  Musculoskeletal: Emaciated, moving all extremities on examination  Skin: no Jaundice    Data   Recent Labs   Lab 08/19/21  0647 08/18/21  0554 08/17/21  0730 08/16/21  0533 08/15/21  1103   WBC 11.7* 12.0* 13.6* 13.1*  --    HGB 7.4* 7.5* 7.7* 7.8*  --    MCV 92 96 92 94  --     303 317 289  --    INR  --   --   --   --  1.86*   * 132* 130* 132*  --    POTASSIUM 4.6 4.3 4.4 4.2  --    CHLORIDE 98 100 97 98  --    CO2  --  26 24 24  --    BUN  --  22 39* 28  --    CR  --  1.77* 2.82* 2.32*  --    ANIONGAP  --  6 9 10  --    JANENE  --  8.0* 8.1* 8.1*  --    GLC  --  135* 151* 136*  --    ALBUMIN  --  1.4* 1.4* 1.4*  --    PROTTOTAL  --  6.8 6.6* 6.8  --    BILITOTAL  --  1.6* 1.8* 1.8*  --    ALKPHOS  --  112 106 113  --    ALT  --  20 22 22  --    AST  --  40 40 45  --

## 2021-08-19 NOTE — PROGRESS NOTES
WO Nurse Inpatient Wound Assessment   Reason for consultation: Evaluate and treat  Peg tube wounds    Assessment  Peg tube wounds due to Moisture Associated Skin Damage (MASD)  Status: healing    Treatment Plan  Peg tube wounds: Daily    Cleanse wound bed with NS and pat dry.  Paint gera tube area with No sting film barrier  Cut a piece of Hydrofera blue (#[id number]) and split in the middle. Dampen it with NS and wring out excess  Place it under the tube. DO not add more dressing under the bumper, may add gauze on top of the bumper if needed  Change dressing daily.    Orders Written  Recommended provider order: None, at this time  WOC Nurse follow-up plan:weekly  Nursing to notify the Provider(s) and re-consult the WOC Nurse if wound(s) deteriorates or new skin concern.    Patient History  According to provider note(s):  30 year old male with a history of heavy alcohol use who presented to OSH 5/19 for abdominal pain, nausea and vomiting, admitted for alcohol hepatitis, ESLD, RADHA on HD, septic shock requiring pressors as well as necrotizing pancreatitis with large evolving necrotic collection. Transferred to [facility] for consideration of drainage of presumed infected necrotic collection. S/p EUS-guided necrosectomy (7/21/21).    Objective Data  Containment of urine/stool: Continent of bladder and Continent of bowel    Active Diet Order  Orders Placed This Encounter      Regular Diet Adult      Output:   I/O last 3 completed shifts:  In: 1320 [P.O.:120; NG/GT:300]  Out: 102 [Emesis/NG output:102]    Risk Assessment:   Sensory Perception: 4-->no impairment  Moisture: 3-->occasionally moist  Activity: 2-->chairfast  Mobility: 3-->slightly limited  Nutrition: 3-->adequate  Friction and Shear: 2-->potential problem  Tyrel Score: 17                          Labs:   Recent Labs   Lab 08/19/21  0647 08/15/21  1103   ALBUMIN 1.4*  --    HGB 7.4*  --    INR  --  1.86*   WBC 11.7*  --        Physical Exam  Areas of skin assessed:  focused peg tube site    Wound Location:  Peg tube         Date of last photo 8/19  Wound History:  7/13 - PEG-J placement with T-tag gastropexy    Wound Base: 100 % pale moist pink tissue at 9 O'clock     Palpation of the wound bed: normal      Drainage: scant     Description of drainage: cloudy and tan     Measurements (length x width x depth, in cm) approximately 0.5  x 1.2  x  0.1 cm (pt declined to have WOC measure the wound)     Tunneling N/A     Undermining N/A  Periwound skin: erythema- blanchable and irritated      Color: pink      Temperature: normal   Odor: none  Pain: moderate and with assessment,   Pain intervention prior to dressing change: NA    Interventions  Visual inspection and assessment completed   Wound Care Rationale Protect periwound skin, Promote moist wound healing without tissue dehydration  and Decrease bacterial load  Wound Care: completed by RN- pt declined at the time  Supplies: ordered: Hydrofera blue  Current off-loading measures: Pillows  Current support surface: Standard  Low air loss mattress  Education provided to: plan of care and wound progress  Discussed plan of care with Patient and Nurse    Laura Galindo RN CWOCN

## 2021-08-20 ENCOUNTER — ANCILLARY PROCEDURE (OUTPATIENT)
Dept: ULTRASOUND IMAGING | Facility: CLINIC | Age: 31
End: 2021-08-20
Attending: INTERNAL MEDICINE
Payer: COMMERCIAL

## 2021-08-20 LAB
ALBUMIN FLD-MCNC: 1.2 G/DL
ALBUMIN SERPL-MCNC: 1.4 G/DL (ref 3.4–5)
ALP SERPL-CCNC: 111 U/L (ref 40–150)
ALT SERPL W P-5'-P-CCNC: 20 U/L (ref 0–70)
AMYLASE FLD-CCNC: 23 U/L
ANION GAP SERPL CALCULATED.3IONS-SCNC: 7 MMOL/L (ref 3–14)
AST SERPL W P-5'-P-CCNC: 40 U/L (ref 0–45)
BILIRUB SERPL-MCNC: 1.5 MG/DL (ref 0.2–1.3)
BUN SERPL-MCNC: 20 MG/DL (ref 7–30)
CALCIUM SERPL-MCNC: 7.9 MG/DL (ref 8.5–10.1)
CHLORIDE BLD-SCNC: 100 MMOL/L (ref 94–109)
CO2 SERPL-SCNC: 25 MMOL/L (ref 20–32)
CREAT SERPL-MCNC: 1.62 MG/DL (ref 0.66–1.25)
ERYTHROCYTE [DISTWIDTH] IN BLOOD BY AUTOMATED COUNT: 17.3 % (ref 10–15)
ERYTHROCYTE [DISTWIDTH] IN BLOOD BY AUTOMATED COUNT: 17.4 % (ref 10–15)
GFR SERPL CREATININE-BSD FRML MDRD: 56 ML/MIN/1.73M2
GLUCOSE BLD-MCNC: 128 MG/DL (ref 70–99)
GLUCOSE BLDC GLUCOMTR-MCNC: 102 MG/DL (ref 70–99)
GLUCOSE BLDC GLUCOMTR-MCNC: 123 MG/DL (ref 70–99)
GRAM STAIN RESULT: NORMAL
GRAM STAIN RESULT: NORMAL
HCT VFR BLD AUTO: 22.4 % (ref 40–53)
HCT VFR BLD AUTO: 23.3 % (ref 40–53)
HGB BLD-MCNC: 7.2 G/DL (ref 13.3–17.7)
HGB BLD-MCNC: 7.4 G/DL (ref 13.3–17.7)
INR PPP: 1.88 (ref 0.85–1.15)
LACTATE SERPL-SCNC: 1.7 MMOL/L (ref 0.7–2)
MAGNESIUM SERPL-MCNC: 1.6 MG/DL (ref 1.6–2.3)
MCH RBC QN AUTO: 29.8 PG (ref 26.5–33)
MCH RBC QN AUTO: 30 PG (ref 26.5–33)
MCHC RBC AUTO-ENTMCNC: 31.8 G/DL (ref 31.5–36.5)
MCHC RBC AUTO-ENTMCNC: 32.1 G/DL (ref 31.5–36.5)
MCV RBC AUTO: 93 FL (ref 78–100)
MCV RBC AUTO: 94 FL (ref 78–100)
PHOSPHATE SERPL-MCNC: 3.2 MG/DL (ref 2.5–4.5)
PLATELET # BLD AUTO: 329 10E3/UL (ref 150–450)
PLATELET # BLD AUTO: 337 10E3/UL (ref 150–450)
POTASSIUM BLD-SCNC: 3.9 MMOL/L (ref 3.4–5.3)
PROT FLD-MCNC: 5.7 G/DL
PROT SERPL-MCNC: 6.9 G/DL (ref 6.8–8.8)
RBC # BLD AUTO: 2.4 10E6/UL (ref 4.4–5.9)
RBC # BLD AUTO: 2.48 10E6/UL (ref 4.4–5.9)
SODIUM SERPL-SCNC: 132 MMOL/L (ref 133–144)
WBC # BLD AUTO: 13 10E3/UL (ref 4–11)
WBC # BLD AUTO: 13.7 10E3/UL (ref 4–11)

## 2021-08-20 PROCEDURE — 82150 ASSAY OF AMYLASE: CPT | Performed by: STUDENT IN AN ORGANIZED HEALTH CARE EDUCATION/TRAINING PROGRAM

## 2021-08-20 PROCEDURE — 87070 CULTURE OTHR SPECIMN AEROBIC: CPT | Performed by: STUDENT IN AN ORGANIZED HEALTH CARE EDUCATION/TRAINING PROGRAM

## 2021-08-20 PROCEDURE — 99233 SBSQ HOSP IP/OBS HIGH 50: CPT | Mod: 25 | Performed by: INTERNAL MEDICINE

## 2021-08-20 PROCEDURE — 250N000013 HC RX MED GY IP 250 OP 250 PS 637: Performed by: INTERNAL MEDICINE

## 2021-08-20 PROCEDURE — 250N000011 HC RX IP 250 OP 636: Performed by: STUDENT IN AN ORGANIZED HEALTH CARE EDUCATION/TRAINING PROGRAM

## 2021-08-20 PROCEDURE — 36592 COLLECT BLOOD FROM PICC: CPT

## 2021-08-20 PROCEDURE — 84157 ASSAY OF PROTEIN OTHER: CPT | Performed by: STUDENT IN AN ORGANIZED HEALTH CARE EDUCATION/TRAINING PROGRAM

## 2021-08-20 PROCEDURE — 82040 ASSAY OF SERUM ALBUMIN: CPT | Performed by: INTERNAL MEDICINE

## 2021-08-20 PROCEDURE — 49083 ABD PARACENTESIS W/IMAGING: CPT | Performed by: INTERNAL MEDICINE

## 2021-08-20 PROCEDURE — 250N000013 HC RX MED GY IP 250 OP 250 PS 637: Performed by: STUDENT IN AN ORGANIZED HEALTH CARE EDUCATION/TRAINING PROGRAM

## 2021-08-20 PROCEDURE — 85610 PROTHROMBIN TIME: CPT | Performed by: STUDENT IN AN ORGANIZED HEALTH CARE EDUCATION/TRAINING PROGRAM

## 2021-08-20 PROCEDURE — 250N000011 HC RX IP 250 OP 636: Performed by: INTERNAL MEDICINE

## 2021-08-20 PROCEDURE — 89050 BODY FLUID CELL COUNT: CPT | Performed by: STUDENT IN AN ORGANIZED HEALTH CARE EDUCATION/TRAINING PROGRAM

## 2021-08-20 PROCEDURE — 99233 SBSQ HOSP IP/OBS HIGH 50: CPT | Performed by: CLINICAL NURSE SPECIALIST

## 2021-08-20 PROCEDURE — 80053 COMPREHEN METABOLIC PANEL: CPT | Performed by: INTERNAL MEDICINE

## 2021-08-20 PROCEDURE — 85027 COMPLETE CBC AUTOMATED: CPT | Performed by: INTERNAL MEDICINE

## 2021-08-20 PROCEDURE — 85027 COMPLETE CBC AUTOMATED: CPT | Performed by: STUDENT IN AN ORGANIZED HEALTH CARE EDUCATION/TRAINING PROGRAM

## 2021-08-20 PROCEDURE — 36592 COLLECT BLOOD FROM PICC: CPT | Performed by: STUDENT IN AN ORGANIZED HEALTH CARE EDUCATION/TRAINING PROGRAM

## 2021-08-20 PROCEDURE — 120N000002 HC R&B MED SURG/OB UMMC

## 2021-08-20 PROCEDURE — 82042 OTHER SOURCE ALBUMIN QUAN EA: CPT | Performed by: STUDENT IN AN ORGANIZED HEALTH CARE EDUCATION/TRAINING PROGRAM

## 2021-08-20 PROCEDURE — 99207 PR CDG-CUT & PASTE-POTENTIAL IMPACT ON LEVEL: CPT | Performed by: INTERNAL MEDICINE

## 2021-08-20 PROCEDURE — 83605 ASSAY OF LACTIC ACID: CPT

## 2021-08-20 PROCEDURE — 84100 ASSAY OF PHOSPHORUS: CPT | Performed by: INTERNAL MEDICINE

## 2021-08-20 PROCEDURE — 87205 SMEAR GRAM STAIN: CPT | Performed by: STUDENT IN AN ORGANIZED HEALTH CARE EDUCATION/TRAINING PROGRAM

## 2021-08-20 PROCEDURE — 83735 ASSAY OF MAGNESIUM: CPT | Performed by: INTERNAL MEDICINE

## 2021-08-20 PROCEDURE — 87075 CULTR BACTERIA EXCEPT BLOOD: CPT | Performed by: STUDENT IN AN ORGANIZED HEALTH CARE EDUCATION/TRAINING PROGRAM

## 2021-08-20 RX ORDER — HYDROMORPHONE HYDROCHLORIDE 1 MG/ML
0.3 INJECTION, SOLUTION INTRAMUSCULAR; INTRAVENOUS; SUBCUTANEOUS
Status: COMPLETED | OUTPATIENT
Start: 2021-08-20 | End: 2021-08-20

## 2021-08-20 RX ADMIN — FOLIC ACID 1 MG: 1 TABLET ORAL at 08:36

## 2021-08-20 RX ADMIN — PANCRELIPASE 2 CAPSULE: 24000; 76000; 120000 CAPSULE, DELAYED RELEASE PELLETS ORAL at 07:03

## 2021-08-20 RX ADMIN — METRONIDAZOLE 500 MG: 500 INJECTION, SOLUTION INTRAVENOUS at 21:46

## 2021-08-20 RX ADMIN — CALCIUM CARBONATE (ANTACID) CHEW TAB 500 MG 500 MG: 500 CHEW TAB at 18:00

## 2021-08-20 RX ADMIN — PANCRELIPASE 2 CAPSULE: 24000; 76000; 120000 CAPSULE, DELAYED RELEASE PELLETS ORAL at 13:48

## 2021-08-20 RX ADMIN — CEFEPIME HYDROCHLORIDE 1 G: 1 INJECTION, POWDER, FOR SOLUTION INTRAMUSCULAR; INTRAVENOUS at 21:42

## 2021-08-20 RX ADMIN — OXYCODONE HYDROCHLORIDE 5 MG: 5 SOLUTION ORAL at 12:24

## 2021-08-20 RX ADMIN — Medication 5 ML: at 08:37

## 2021-08-20 RX ADMIN — ONDANSETRON 4 MG: 2 INJECTION INTRAMUSCULAR; INTRAVENOUS at 08:52

## 2021-08-20 RX ADMIN — SODIUM BICARBONATE 325 MG: 325 TABLET ORAL at 07:03

## 2021-08-20 RX ADMIN — MELATONIN TAB 3 MG 6 MG: 3 TAB at 22:04

## 2021-08-20 RX ADMIN — ONDANSETRON 4 MG: 2 INJECTION INTRAMUSCULAR; INTRAVENOUS at 23:10

## 2021-08-20 RX ADMIN — HYDROMORPHONE HYDROCHLORIDE 1 MG: 1 SOLUTION ORAL at 13:47

## 2021-08-20 RX ADMIN — METRONIDAZOLE 500 MG: 500 INJECTION, SOLUTION INTRAVENOUS at 04:27

## 2021-08-20 RX ADMIN — THIAMINE HCL TAB 100 MG 100 MG: 100 TAB at 08:34

## 2021-08-20 RX ADMIN — SODIUM BICARBONATE 325 MG: 325 TABLET ORAL at 09:10

## 2021-08-20 RX ADMIN — HYDROMORPHONE HYDROCHLORIDE 0.3 MG: 1 INJECTION, SOLUTION INTRAMUSCULAR; INTRAVENOUS; SUBCUTANEOUS at 15:46

## 2021-08-20 RX ADMIN — SODIUM BICARBONATE 325 MG: 325 TABLET ORAL at 13:47

## 2021-08-20 RX ADMIN — OXYCODONE HYDROCHLORIDE 5 MG: 5 SOLUTION ORAL at 22:04

## 2021-08-20 RX ADMIN — Medication 1 PACKET: at 13:48

## 2021-08-20 RX ADMIN — SODIUM BICARBONATE 325 MG: 325 TABLET ORAL at 18:01

## 2021-08-20 RX ADMIN — SODIUM BICARBONATE 325 MG: 325 TABLET ORAL at 22:04

## 2021-08-20 RX ADMIN — HYDROXYZINE HYDROCHLORIDE 50 MG: 25 TABLET, FILM COATED ORAL at 01:33

## 2021-08-20 RX ADMIN — Medication 1 PACKET: at 08:39

## 2021-08-20 RX ADMIN — Medication 1 PACKET: at 22:02

## 2021-08-20 RX ADMIN — PANCRELIPASE 2 CAPSULE: 24000; 76000; 120000 CAPSULE, DELAYED RELEASE PELLETS ORAL at 18:05

## 2021-08-20 RX ADMIN — METRONIDAZOLE 500 MG: 500 INJECTION, SOLUTION INTRAVENOUS at 12:25

## 2021-08-20 RX ADMIN — PANCRELIPASE 2 CAPSULE: 24000; 76000; 120000 CAPSULE, DELAYED RELEASE PELLETS ORAL at 02:43

## 2021-08-20 RX ADMIN — SODIUM BICARBONATE 325 MG: 325 TABLET ORAL at 02:43

## 2021-08-20 RX ADMIN — OXYCODONE HYDROCHLORIDE 5 MG: 5 SOLUTION ORAL at 04:27

## 2021-08-20 RX ADMIN — Medication 1 PACKET: at 08:38

## 2021-08-20 RX ADMIN — PANCRELIPASE 2 CAPSULE: 24000; 76000; 120000 CAPSULE, DELAYED RELEASE PELLETS ORAL at 22:03

## 2021-08-20 RX ADMIN — PANCRELIPASE 2 CAPSULE: 24000; 76000; 120000 CAPSULE, DELAYED RELEASE PELLETS ORAL at 09:11

## 2021-08-20 RX ADMIN — HYDROMORPHONE HYDROCHLORIDE 1 MG: 1 SOLUTION ORAL at 08:52

## 2021-08-20 ASSESSMENT — ACTIVITIES OF DAILY LIVING (ADL)
ADLS_ACUITY_SCORE: 18
ADLS_ACUITY_SCORE: 17
ADLS_ACUITY_SCORE: 17
ADLS_ACUITY_SCORE: 18
ADLS_ACUITY_SCORE: 17
ADLS_ACUITY_SCORE: 18

## 2021-08-20 ASSESSMENT — MIFFLIN-ST. JEOR: SCORE: 1702.75

## 2021-08-20 NOTE — PROGRESS NOTES
Nephrology Progress Note  08/20/2021         Dax Villareal is a 30 year old male with h/o ETOH hepatitis, end stage liver disease, RADHA on HD, transferred from [facility] in [place] for septic shock on 6/28/21 for treatment of necrotizing pancreatitis and decompensated liver disease. He was initially admitted to [facility] 5/19/21 and started on RRT 5/26/2021.        Interval History :   Mr Villareal had necrosectomy for 4th time on 8/11 with no more anticipated, ongoing issues with pleural effusions but planning for possible thoracentesis today.  Ran HD yesterday, labs stable today and from volume standpoint appears euvolemic.  Continuing HD on TTS schedule, nearing ESRD with 3 months of HD, does make small amounts of UOP.        Assessment & Recommendations:   RADHA nearing ESRD-Cr was 0.8 as recently as 5/19/2021 but now HD dependent for 3 months, started RRT at [facility] prior to transfer to [facility] on 5/26.  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  continuing restorative cares for now, BP's have been stable enough to support HD.  Had GI stenting of pancreas x4.                 -Line is TDC from PTA               - HD on TTS schedule unless issues arise.      Volume status-Making small amounts of UOP, has large pleural effusion by CT which he is refusing to have tapped and IVC is dilated on ECHO although this is likely worsened due to effusion.  BP's stable but was tachycardic so pulled 1L of UF yesterday.                -likely needs another thoracentesis, refusing.      Electrolytes/pH-K 3.9, bicarb 25, no acute issues.       Ca/phos/pth-Ca 8.1, corrects to normal with low albumin.  Mg and Phos reasonable.       Anemia-Hgb low at 7.4, acute management per team, last PRBC's 7/14.  Started on EPO 5k with runs, iron sats 43%      Nutrition-Taking PO, appetite reasonable.       Seen and discussed with Dr Brown      Recommendations were  "communicated to primary team via note       SOFIA Marlow CNS  Clinical Nurse Specialist  [phone]    Review of Systems:   I reviewed the following systems:  Gen: No fevers or chills  CV: No CP at rest  Resp: No SOB at rest  GI: No N/V    Physical Exam:   I/O last 3 completed shifts:  In: 1610 [P.O.:400; I.V.:40; NG/GT:60]  Out: 1530 [Urine:30; Other:1500]   /50 (BP Location: Left arm)   Pulse (!) 121   Temp 98.2  F (36.8  C) (Oral)   Resp 20   Ht 1.676 m (5' 6\")   Wt 76 kg (167 lb 8.8 oz)   SpO2 95%   BMI 27.04 kg/m       GENERAL APPEARANCE: Lying in bed, RR shallow and he is coughing frequently  EYES:  scleral icterus, pupils equal  Pulmonary: decreased BS   CV: tachy   - Edema - no LE edema  GI: mild distention, non-tender. Has pancreatic drain  MS: no evidence of inflammation in joints, no muscle tenderness  SKIN: no rash, warm, dry, no cyanosis  NEURO: alert/interactive  ACCESS: Right TDC    Labs:   All labs reviewed by me  Electrolytes/Renal - Recent Labs   Lab Test 08/20/21  0610 08/19/21  0647 08/18/21  0554   * 131* 132*   POTASSIUM 3.9 4.6 4.3   CHLORIDE 100 98 100   CO2 25 24 26   BUN 20 34* 22   CR 1.62* 2.34* 1.77*   * 135* 135*   JANENE 7.9* 8.1* 8.0*   MAG 1.6 1.7 1.7   PHOS 3.2 4.3 3.2       CBC -   Recent Labs   Lab Test 08/20/21  0610 08/19/21  0647 08/18/21  0554   WBC 13.7* 11.7* 12.0*   HGB 7.4* 7.4* 7.5*    328 303       LFTs -   Recent Labs   Lab Test 08/20/21  0610 08/19/21  0647 08/18/21  0554   ALKPHOS 111 111 112   BILITOTAL 1.5* 1.6* 1.6*   ALT 20 20 20   AST 40 39 40   PROTTOTAL 6.9 6.9 6.8   ALBUMIN 1.4* 1.4* 1.4*       Iron Panel -   Recent Labs   Lab Test 08/14/21 2055 07/19/21  0632   IRON 28* 31*   IRONSAT 21 43   JERRELL 2,186*  --            Current Medications:    sodium bicarbonate  325 mg Per Feeding Tube Q4H    And     amylase-lipase-protease  1-2 capsule Per Feeding Tube Q4H     amylase-lipase-protease  2 capsule Oral TID w/meals     B and " C vitamin Complex with folic acid  5 mL Per Feeding Tube Daily     banatrol plus  1 packet Per Feeding Tube TID     ceFEPIme (MAXIPIME) IV  1 g Intravenous Q24H     folic acid  1 mg Oral Daily     [Held by provider] heparin ANTICOAGULANT  5,000 Units Subcutaneous Q12H     melatonin  6 mg Oral At Bedtime     menthol   Transdermal Q8H     metroNIDAZOLE  500 mg Intravenous Q8H     oxyCODONE  5 mg Oral Q8H     protein modular  1 packet Per Feeding Tube BID     sodium chloride (PF)  3 mL Intracatheter Q8H     sodium chloride (PF)  73 mL Intravenous Once     vitamin B1  100 mg Oral Daily    Or     thiamine  100 mg Intravenous Daily       - MEDICATION INSTRUCTIONS -       dextrose

## 2021-08-20 NOTE — PROVIDER NOTIFICATION
D/I: Pt is alert and oriented x4. Tachycardic and dyspneic on 4L oxygen via NC. Right Chest dialysis access intact. GJ tube patent with J--port running TF @ 50 mL/hr with solution + enzymes/NaBicarb Q4h. G-port to gravity for venting. Left upper abd drain dressing CDI. Skin is jaundiced and fragile. Pt repositions independently in bed. Pt lowered right upper siderail.  P: Will continue to monitor pt and follow POC.

## 2021-08-20 NOTE — PLAN OF CARE
Assumed cares 1500 - 2300    Neuro: AOx4, calm, cooperative with flat affect  GI: on regular diet with continuous feeding running at 50 ml/hour  : on hemodialysis  Resp: Dyspnea on exertion  Cardiac: Tachycardic, regular  Lines/Drains: GJ tube (G tube to drainage bag, J tube to feeding) , Left PICC (triple lumen) to TKO,  Peritoneal tube left flank (clamped).   Pain: Generalized pain, PRN and scheduled dilaudid given  Behavior: Cooperative with cares occasionally refusing repositioning     Plan of Care:  Continue with cares. Update provider for changes.

## 2021-08-20 NOTE — PLAN OF CARE
[id number]    Neuro: A&Ox4; sclera yellow; tired between cares  GI: abdomen distended with G tube to gravity and intermittent meds through.  J tube infusing 50mL/hr TF with 60mL/hr q4 flushes.  Encouraged oral intake to no avail today.  PRN zofran given x1 with stated decrease in nausea. Bowel sounds present x4.  Education on keeping HOB at 30 degrees or higher while on TF, to decrease aspiration risk. Pt bed locked at 30 degrees after pt lowered HOB self.  : on HD  Resp: tachypnea and shallow breathing noted. VSS on 4L NC.  Encouraged coughing and deep breathing.  Decreased lower lobe sounds.  Mobility: x1 assist repositioning in bed; x2 with gait belt out of bed. Encouraged repositioning with education x3.  Pt refused some repositioning stating pain at pancreatic tube site.  Prefers to lay on R side, facing window.    Cardiac: tachycardia noted  Skin: pale; jaundiced; dressing changed around PEG tube per POC with scant drainage noted; dressing changed at pancreatic tube site with no drainage.  Bilateral heels peeling skin; lotion applied.  Lines/Drains: L triple lumen PICC infusing TKO through red line; white line SL and patent; grey line difficulty flushing (oncoming RN notified); R HD CVC access; PEG tube infusing TF through J tube and G tube to gravity. Pancreatic drain clamped.  Pain: intermittent pain at pancreatic site decreased with PRN dilaudid x2  Behavior: calm; intermittently cooperative; can be demanding of multiple cares at once    Plan of Care: Continue to monitor pain, encourage repositioning/movement; keep HOB at 30 degrees, and encourage oral intake. Continue cares and assessments per provider instruction; monitor for changes.

## 2021-08-20 NOTE — CONSULTS
Consult and Procedure Service - Procedure Note    Attending: Yuki  Resident: n/a  Indication: assess peritoneal fluid  Risk Assessment: moderate  Pre-procedure diagnosis: ascites  Post-procedure diagnosis: same, possible  hemoperitoneum  Procedure name: paracentesis    The risks and benefits of the procedure were explained to pt who expressed understanding and opted to proceed.  Consent was obtained and placed in the chart.  A time out was performed.  An area of ascites was located with ultrasound and marked in the RL quadrant; the area was prepped and draped in the usual sterile fashion.  10 ml of 1% lidocaine was instilled with a 22 gauge needle and ascites located under real-time guidance. 10 ml of dark red colored fluid was removed and sent for analysis and the area dressed. The fluid clotted when put on gauze and thickened in the tubes indicating blood, however, the tip of the needle was visualized the entire procedure and under realtime guidance could be seen going into the ascitic pocket. Discussed with maroon team and patient will be monitored closely in case of complication.    Patient tolerated the procedure well. Please contact the Consult and Procedure Service if any complications or concerns arise.     TREASURE SALEEM MD, [facility]  Internal Medicine Hospitalist & Staff Physician  Formerly [facility]  Pager: [phone]  [email or web address]    DOS:  August 20, 2021

## 2021-08-20 NOTE — PROGRESS NOTES
[facility]  Progress Note - Maroon 3 Service        Date of Admission:  6/28/2021    Assessment & Plan   Dax Villareal is a 30 year old male with hx of alcohol use disorder admitted on 6/28/2021 after recent prolonged admission at [facility] in [place] who has severe acute alcoholic hepatitis c/b HE, ESRD requiring HD, and malnutrition in the setting of acute necrotizing pancreatitis, s/p PEG-J placement on 7/13. He arrived with acute respiratory failure and septic shock that have resolved, and his secondary bacterial peritonitis has been appropriately treated. He requires continued management for ESRD, hepatic encephalopathy, infected pancreatic pseudocyst, nutritional support via PEG-J, and persistent right pleural effusion. Clinically improving after multiple necrosectomies.     Today:  - Patient agreeable to paracentesis today, CAPS consulted with labs ordered. Wei blood on paracentesis. Will repeat CBC at 2200.    # Necrotizing alcoholic pancreatitis w/ infected pseudocyst s/p RP Drain and Cystogastrostomy  # Septic Shock, resolved   # Leukocytosis    Admitted to the [facility] ICU on 6/28/2021 from [facility] in [place] for septic shock due to necrotizing pancreatitis. CT on admit showing mature collection with enhancing wall measuring ~[id number]. IR placed perc drain 6/29. Repeat imaging 7/18 revealed walled off necrotic collection amenable to endoscopic transluminal drainage with cystgastrostomy stenting with necrosectomy on 7/21/21.  7/28 - EGD, stent replacement (transluminal Axios with coaxial Solus), PEG-J removal (buried bumper) and replacement of new PEG-J with one new T-tag. Repeat necrosectomy and Axios removal/replacement Friday 8/6. Repeat CT on 8/9. 8/11 - EGD with completion necrosectomy, no e/o bowel fistula, no plans for further necrosectomy unless further pain/fevers warrants further evaluation. RP drain capped with plans for reimaging on  8/23/2021     - Continue Cefepime 1 gm Q 24 + flagyl, previously recommended to continue until removal of RP drain  - Daily CBC, watching fever curve  - GI following, appreciate recs              - Pancreatic enzymes   - Flush drain with 100ml q4hr per GI   - Blood cultures and fluid cultures NGTD    # Secondary Bacterial Peritonitis 2/2 infected pseudocyst  # Peripancreatic fluid collection growing VRE   7/2 - Diagnostic paracentesis (WBC 2663 - 74% PMN, SAAG <1.1, protein 4.1)     Antibiotics:   Current: Cefepime 1 gm Q 24 + flagyl 500 q8 (8/6-present)     Past:  - Linezolid 7/21-7/30  - Vancomcyin 6/29  - Meropenem 6/29-7/18  - Micafungin 6/29-7/1  - Cipro 7/19-7/21  - Ceftriaxone 7/21-7/24; 7/26-7/27  - Zosyn 7/27-8/6    - Paracentesis PRN      # Right hepatic/pancreatic hydrothorax  Intermittently increased subjective shortness of breath. CXR on 7/19 w/ increased RLL haziness; ddx includes pleural effusion v pneumonia v atelectasis. CT on 7/28 showed large volume R hepatic hydrothorax with complete atelectasis of right lung, bedside thora completed 7/28 with labs and cultures.     CXR 7/30 showed moderate R pleural effusion, unchanged from previous and improved aeration of right lung with small remaining pleural effusion on CT. Thora cultures negative. Repeat Thora on 8/9 showing exudative fluid     - IR completed thoracentesis 7/28 - exudative  - ID recommended continued empiric abx, though pleural effusion most likely 2/2 hepatic hydrothorax and pancreatitis. Defer plan for repeat thoracentesis based on symptoms and oxygen requirement. No plan for chest tube given concern for infectious nidus  - Supplemental oxygen as needed to keep sats above 89%  - Repeat Thoracentesis PRN for dyspnea    #Hepatic Abscess  CT Ab/pelvis from 8/13 with developing hepatic abscess vs. infarct. IR consulted. No intervention for abscesses <5 cm and appears more likely infarct rather than abscess per IR. Risk of procedural  "intervention outweighs benefit at this point.   - Will reassess with future imaging on 823.  - Blood cultures from 8/13 NGTD  - Para & thora differed per GI  - Continuing current antibiotics     #Hematochezia, resolved  Patient CTA-Chest on 8/8 with \"possible\" non-obstructive PE in KALYAN Pulmonary Artery Branch. Patient has largely been bed bound and not on DVT prophylaxis for several days, especially in setting of recent procedures. Troponin negative x2. BRBPR with heparin. Ultrasounds negative for DVT.  - CTA-Chest on 8/10 negative for PE, no indication for anticuagulation  - Hematology recommends starting prophylactic dose anticoagulation once stable x1 week, stepping up to unfractionated heparin, likely will need 3 months of anticoagulation given hypercoagulable state of necrotizing pancreatitis   - Heme to be contacted prior to discharge when ready to transition to orals    # Lactic acidosis  Multifactorial d/t decreased lactic clearance in the setting of acute hepatic failure, ESRD, and acute on chronic infection.  - Continue to trend and assess vitals     # Gastric Outlet Obstruction  High G tube output due to the cystogastric connection/fluid drainage noted on 8/5. Per GI, possibly related to gastric outlet from severe abdominal inflammation and expected. KUB negative for obstructive gas pattern. Ileus from narcotics considered, but output not consistent with tube feeds. Improved after cystogastrectomy  -Anti-emetics  -Will monitor and continue to discuss with GI  -Having fluid removal per nephrology due to HD    #Hypokalemia, resolved  High GI output state. Replete as needed with ESRD noted. Improved with decreased G-tube drainage after cystogastrectomy.    # Severe malnutrition in setting of chronic illness  PEG-J placed 7/13. Patient intermittently refusing tube feeds d/t nausea/vomiting. Improving oral intake s/p necresectomies.  - Dietitian consult for new tube feeds, appreciate recs   - Continuous TF per " dietician order with goal of 65 ml/hr              - Cont panc enzymes   - Will trial regular diet with level 0 liquids as below  - Regular Diet, thin liquids per SLP  - VFSS for 8/10 to evaluate for dysphagia - cleared for level 0 liquids though vomited following  - Continue multivitamins  - Pantoprazole 40 mg IV daily  - Zofran PRN    Malnutrition:    - Level of malnutrition: Severe   - Based on: moderate/severe subcutaneous fat loss, moderate/severe muscle loss     # Alcoholic hepatitis complicated by portal hypertension, ascites, and HE - improving   # Hx of Alcohol use disorder c/b withdrawal   # Anemia  # Coagulopathy   Patient with a history of marked alcohol use and alcoholic hepatitis in the past. Was given a course of steroids (1 week) during previous admission at OSH. Patient mental status has been stable, alert and oriented x4 after rifaxamin and aggressive lactulose. Not a liver transplant candidate.   - Lactulose 20g daily and PRN and rifaximin 550mg BID - as patient allows  - Daily folic acid and thiamine  - Delirium precautions  - Melatonin 6mg at bedtime  - Daily CBC   - Transfuse if hemoglobin < 7, platelet < 10 or < 30 with bleeding  - Continue to monitor for signs of bleeding      # Stage III RADHA on HD due to ATN  # ESRD due to ATN  # Hypervolemia causing acute respiratory failure, resolved  Nephrology note 7/21 indicates that patient has been on renal replacement therapy for two months and pt status has most likely transitioned from ARF to ESRD at this point.      > Hemodialysis as per renal reccs - TTS              - Midodrine 10 mg PRN before dialysis sessions     > Daily chemistry labs, strict I/O, and daily weights  - Aspiration precautions  - Supplemental oxygen as needed to keep sats above 89%    # Anemia  Baseline hgb 17. Running between 6-8 in setting of chronic illness, new ESRD with epocrit per Nephrology, frequent lab draws and procedures.  - Goal hgb >7.0, trend Hgb    # Intermittent  lactic acidosis  Suspect due to poor lactate clearance due to ESRD and ESLD + ongoing inflammation/infection. No fluids given pt is on dialysis; however, did receive Albumin last night.    # Apathy  Patient is intermittently refusing cares due to wanting a 'break,' but also reaffirms that he wants everything done to prolong his life. Mood is depressed, apathetic at times. Patient has multiple stressors including his own medical conditions and family health issues.  - Continue to offer emotional support services such as psychiatry, palliative,   - He declined psychiatry/psychology visits     # Goals of Care  Poor prognosis with multiorgan failure and non-compliance with treatment team. Palliative care and care conference on 7/19. Patient not participatory. Prognosis is gaurded with long course to rehab  - Hepatology confirmed pt will not be a transplant candidate  - Daily phone updates to sister Noni ([phone])       Diet: Regular Diet Adult  Adult Formula Drip Feeding: Continuous Vital 1.5; Jejunostomy; Goal Rate: 50; mL/hr; Medication - Feeding Tube Flush Frequency: At least 15-30 mL water before and after medication administration and with tube clogging; Amount to Send (Nutrition us...    DVT Prophylaxis: Pneumatic Compression Devices  Rdz Catheter: Not present  Fluids: 10 mL NS TKO  Central Lines: PRESENT  PICC Triple Lumen 06/28/21 Left-Site Assessment: WDL  CVC Double Lumen Right-Site Assessment: WDL  Code Status: Full Code      Disposition Plan   Expected discharge: >7 days, recommended to transitional care unit once  nutrition improved, infected pancreatic pseudocyst is well managed and plan for dialysis is in place.       Johny Ortiz MD  [phone]  [address]  Securely message with the Vocera Web Console (learn more here)  Text page via [employer] Paging/Directory  Please see sign in/sign out for up to date coverage  information  _____________________________________________________________________    Interval History    Poor oral intake, loos stools, afebrile, pain well-controlled.    Physical Exam   Vital Signs: Temp: (!) 96.2  F (35.7  C) Temp src: Oral BP: 104/47 Pulse: 118   Resp: 18 SpO2: 94 % O2 Device: Nasal cannula Oxygen Delivery: 4 LPM  Weight: 167 lbs 8.79 oz  Constitutional: Awake, non-diaphoretic, conversational regarding upcoming surgery plans  HEENT: Normocephalic. Atraumatic. No scleral icterus.  Cardiovascular: tachycardic, tender chest to palpation diffusely  Respiratory: Tachypnic, Decreased breath sounds on right, NC on for comfort  Abdomen: Tolerates palpation with minimal pain, PEG-tube site clean, RUQ tenderness  Musculoskeletal: Emaciated, moving all extremities on examination  Skin: no Jaundice    Data   Recent Labs   Lab 08/20/21  0610 08/19/21  0647 08/18/21  0554 08/15/21  1103   WBC 13.7* 11.7* 12.0*  --    HGB 7.4* 7.4* 7.5*  --    MCV 94 92 96  --     328 303  --    INR 1.88*  --   --  1.86*   * 131* 132*  --    POTASSIUM 3.9 4.6 4.3  --    CHLORIDE 100 98 100  --    CO2 25 24 26  --    BUN 20 34* 22  --    CR 1.62* 2.34* 1.77*  --    ANIONGAP 7 9 6  --    JANENE 7.9* 8.1* 8.0*  --    * 135* 135*  --    ALBUMIN 1.4* 1.4* 1.4*  --    PROTTOTAL 6.9 6.9 6.8  --    BILITOTAL 1.5* 1.6* 1.6*  --    ALKPHOS 111 111 112  --    ALT 20 20 20  --    AST 40 39 40  --

## 2021-08-21 ENCOUNTER — APPOINTMENT (OUTPATIENT)
Dept: CT IMAGING | Facility: CLINIC | Age: 31
End: 2021-08-21
Attending: INTERNAL MEDICINE
Payer: MEDICAID

## 2021-08-21 LAB
ABO/RH(D): NORMAL
ALBUMIN SERPL-MCNC: 1.4 G/DL (ref 3.4–5)
ALP SERPL-CCNC: 101 U/L (ref 40–150)
ALT SERPL W P-5'-P-CCNC: 19 U/L (ref 0–70)
ANION GAP SERPL CALCULATED.3IONS-SCNC: 7 MMOL/L (ref 3–14)
ANTIBODY SCREEN: NEGATIVE
AST SERPL W P-5'-P-CCNC: 43 U/L (ref 0–45)
BILIRUB SERPL-MCNC: 1.6 MG/DL (ref 0.2–1.3)
BLD PROD TYP BPU: NORMAL
BLD PROD TYP BPU: NORMAL
BLOOD COMPONENT TYPE: NORMAL
BLOOD COMPONENT TYPE: NORMAL
BUN SERPL-MCNC: 30 MG/DL (ref 7–30)
CALCIUM SERPL-MCNC: 8.2 MG/DL (ref 8.5–10.1)
CHLORIDE BLD-SCNC: 96 MMOL/L (ref 94–109)
CO2 SERPL-SCNC: 26 MMOL/L (ref 20–32)
CODING SYSTEM: NORMAL
CODING SYSTEM: NORMAL
CREAT SERPL-MCNC: 2.14 MG/DL (ref 0.66–1.25)
CROSSMATCH: NORMAL
CROSSMATCH: NORMAL
ERYTHROCYTE [DISTWIDTH] IN BLOOD BY AUTOMATED COUNT: 16.9 % (ref 10–15)
ERYTHROCYTE [DISTWIDTH] IN BLOOD BY AUTOMATED COUNT: 17.5 % (ref 10–15)
GFR SERPL CREATININE-BSD FRML MDRD: 40 ML/MIN/1.73M2
GLUCOSE BLD-MCNC: 125 MG/DL (ref 70–99)
GLUCOSE BLDC GLUCOMTR-MCNC: 107 MG/DL (ref 70–99)
HCT VFR BLD AUTO: 22.7 % (ref 40–53)
HCT VFR BLD AUTO: 24.5 % (ref 40–53)
HGB BLD-MCNC: 6.9 G/DL (ref 13.3–17.7)
HGB BLD-MCNC: 7.8 G/DL (ref 13.3–17.7)
ISSUE DATE AND TIME: NORMAL
LACTATE SERPL-SCNC: 1.7 MMOL/L (ref 0.7–2)
MAGNESIUM SERPL-MCNC: 1.6 MG/DL (ref 1.6–2.3)
MCH RBC QN AUTO: 29.4 PG (ref 26.5–33)
MCH RBC QN AUTO: 29.4 PG (ref 26.5–33)
MCHC RBC AUTO-ENTMCNC: 30.4 G/DL (ref 31.5–36.5)
MCHC RBC AUTO-ENTMCNC: 31.8 G/DL (ref 31.5–36.5)
MCV RBC AUTO: 93 FL (ref 78–100)
MCV RBC AUTO: 97 FL (ref 78–100)
PHOSPHATE SERPL-MCNC: 4.2 MG/DL (ref 2.5–4.5)
PLATELET # BLD AUTO: 327 10E3/UL (ref 150–450)
PLATELET # BLD AUTO: 337 10E3/UL (ref 150–450)
POTASSIUM BLD-SCNC: 4.4 MMOL/L (ref 3.4–5.3)
PROT SERPL-MCNC: 6.9 G/DL (ref 6.8–8.8)
RBC # BLD AUTO: 2.35 10E6/UL (ref 4.4–5.9)
RBC # BLD AUTO: 2.65 10E6/UL (ref 4.4–5.9)
SODIUM SERPL-SCNC: 129 MMOL/L (ref 133–144)
SPECIMEN EXPIRATION DATE: NORMAL
UNIT ABO/RH: NORMAL
UNIT ABO/RH: NORMAL
UNIT NUMBER: NORMAL
UNIT NUMBER: NORMAL
UNIT STATUS: NORMAL
UNIT STATUS: NORMAL
UNIT TYPE ISBT: 7300
UNIT TYPE ISBT: 7300
WBC # BLD AUTO: 12.3 10E3/UL (ref 4–11)
WBC # BLD AUTO: 13.2 10E3/UL (ref 4–11)

## 2021-08-21 PROCEDURE — 36592 COLLECT BLOOD FROM PICC: CPT | Performed by: STUDENT IN AN ORGANIZED HEALTH CARE EDUCATION/TRAINING PROGRAM

## 2021-08-21 PROCEDURE — 80053 COMPREHEN METABOLIC PANEL: CPT | Performed by: INTERNAL MEDICINE

## 2021-08-21 PROCEDURE — 99207 PR CDG-CUT & PASTE-POTENTIAL IMPACT ON LEVEL: CPT | Performed by: INTERNAL MEDICINE

## 2021-08-21 PROCEDURE — 85027 COMPLETE CBC AUTOMATED: CPT | Performed by: STUDENT IN AN ORGANIZED HEALTH CARE EDUCATION/TRAINING PROGRAM

## 2021-08-21 PROCEDURE — 99233 SBSQ HOSP IP/OBS HIGH 50: CPT | Mod: GC | Performed by: INTERNAL MEDICINE

## 2021-08-21 PROCEDURE — 74174 CTA ABD&PLVS W/CONTRAST: CPT | Mod: 26

## 2021-08-21 PROCEDURE — 250N000011 HC RX IP 250 OP 636: Performed by: INTERNAL MEDICINE

## 2021-08-21 PROCEDURE — 83735 ASSAY OF MAGNESIUM: CPT | Performed by: INTERNAL MEDICINE

## 2021-08-21 PROCEDURE — 86923 COMPATIBILITY TEST ELECTRIC: CPT | Performed by: INTERNAL MEDICINE

## 2021-08-21 PROCEDURE — 83605 ASSAY OF LACTIC ACID: CPT | Performed by: STUDENT IN AN ORGANIZED HEALTH CARE EDUCATION/TRAINING PROGRAM

## 2021-08-21 PROCEDURE — 250N000013 HC RX MED GY IP 250 OP 250 PS 637: Performed by: INTERNAL MEDICINE

## 2021-08-21 PROCEDURE — 85027 COMPLETE CBC AUTOMATED: CPT | Performed by: INTERNAL MEDICINE

## 2021-08-21 PROCEDURE — 120N000002 HC R&B MED SURG/OB UMMC

## 2021-08-21 PROCEDURE — 99233 SBSQ HOSP IP/OBS HIGH 50: CPT | Performed by: INTERNAL MEDICINE

## 2021-08-21 PROCEDURE — 250N000011 HC RX IP 250 OP 636: Performed by: STUDENT IN AN ORGANIZED HEALTH CARE EDUCATION/TRAINING PROGRAM

## 2021-08-21 PROCEDURE — 634N000001 HC RX 634: Performed by: CLINICAL NURSE SPECIALIST

## 2021-08-21 PROCEDURE — 90937 HEMODIALYSIS REPEATED EVAL: CPT

## 2021-08-21 PROCEDURE — 74174 CTA ABD&PLVS W/CONTRAST: CPT

## 2021-08-21 PROCEDURE — 86850 RBC ANTIBODY SCREEN: CPT | Performed by: INTERNAL MEDICINE

## 2021-08-21 PROCEDURE — 250N000013 HC RX MED GY IP 250 OP 250 PS 637: Performed by: STUDENT IN AN ORGANIZED HEALTH CARE EDUCATION/TRAINING PROGRAM

## 2021-08-21 PROCEDURE — P9016 RBC LEUKOCYTES REDUCED: HCPCS | Performed by: INTERNAL MEDICINE

## 2021-08-21 PROCEDURE — 36592 COLLECT BLOOD FROM PICC: CPT | Performed by: INTERNAL MEDICINE

## 2021-08-21 PROCEDURE — 84100 ASSAY OF PHOSPHORUS: CPT | Performed by: INTERNAL MEDICINE

## 2021-08-21 PROCEDURE — 86901 BLOOD TYPING SEROLOGIC RH(D): CPT | Performed by: INTERNAL MEDICINE

## 2021-08-21 PROCEDURE — 258N000003 HC RX IP 258 OP 636: Performed by: CLINICAL NURSE SPECIALIST

## 2021-08-21 PROCEDURE — 86923 COMPATIBILITY TEST ELECTRIC: CPT | Performed by: STUDENT IN AN ORGANIZED HEALTH CARE EDUCATION/TRAINING PROGRAM

## 2021-08-21 RX ORDER — CYCLOBENZAPRINE HCL 10 MG
10 TABLET ORAL
Status: COMPLETED | OUTPATIENT
Start: 2021-08-21 | End: 2021-08-22

## 2021-08-21 RX ORDER — IOPAMIDOL 755 MG/ML
108 INJECTION, SOLUTION INTRAVASCULAR ONCE
Status: COMPLETED | OUTPATIENT
Start: 2021-08-21 | End: 2021-08-21

## 2021-08-21 RX ORDER — CYCLOBENZAPRINE HCL 10 MG
10 TABLET ORAL ONCE
Status: DISCONTINUED | OUTPATIENT
Start: 2021-08-21 | End: 2021-08-21

## 2021-08-21 RX ADMIN — Medication 5 ML: at 09:01

## 2021-08-21 RX ADMIN — PANCRELIPASE 2 CAPSULE: 24000; 76000; 120000 CAPSULE, DELAYED RELEASE PELLETS ORAL at 13:41

## 2021-08-21 RX ADMIN — SODIUM BICARBONATE 325 MG: 325 TABLET ORAL at 02:04

## 2021-08-21 RX ADMIN — SODIUM CHLORIDE 250 ML: 9 INJECTION, SOLUTION INTRAVENOUS at 14:39

## 2021-08-21 RX ADMIN — OXYCODONE HYDROCHLORIDE 5 MG: 5 SOLUTION ORAL at 04:22

## 2021-08-21 RX ADMIN — OXYCODONE HYDROCHLORIDE 5 MG: 5 SOLUTION ORAL at 19:57

## 2021-08-21 RX ADMIN — SODIUM CHLORIDE 300 ML: 9 INJECTION, SOLUTION INTRAVENOUS at 14:39

## 2021-08-21 RX ADMIN — ACETAMINOPHEN 325 MG: 325 SOLUTION ORAL at 19:57

## 2021-08-21 RX ADMIN — OXYCODONE HYDROCHLORIDE 5 MG: 5 SOLUTION ORAL at 12:05

## 2021-08-21 RX ADMIN — METRONIDAZOLE 500 MG: 500 INJECTION, SOLUTION INTRAVENOUS at 19:46

## 2021-08-21 RX ADMIN — ONDANSETRON 4 MG: 2 INJECTION INTRAMUSCULAR; INTRAVENOUS at 19:52

## 2021-08-21 RX ADMIN — CALCIUM CARBONATE (ANTACID) CHEW TAB 500 MG 500 MG: 500 CHEW TAB at 22:56

## 2021-08-21 RX ADMIN — HYDROMORPHONE HYDROCHLORIDE 1 MG: 1 SOLUTION ORAL at 13:54

## 2021-08-21 RX ADMIN — MELATONIN TAB 3 MG 6 MG: 3 TAB at 22:30

## 2021-08-21 RX ADMIN — SODIUM BICARBONATE 325 MG: 325 TABLET ORAL at 22:30

## 2021-08-21 RX ADMIN — THIAMINE HCL TAB 100 MG 100 MG: 100 TAB at 09:01

## 2021-08-21 RX ADMIN — SODIUM BICARBONATE 325 MG: 325 TABLET ORAL at 06:34

## 2021-08-21 RX ADMIN — SODIUM BICARBONATE 325 MG: 325 TABLET ORAL at 09:01

## 2021-08-21 RX ADMIN — Medication: at 14:39

## 2021-08-21 RX ADMIN — Medication 1 PACKET: at 09:01

## 2021-08-21 RX ADMIN — Medication 1 PACKET: at 09:05

## 2021-08-21 RX ADMIN — METRONIDAZOLE 500 MG: 500 INJECTION, SOLUTION INTRAVENOUS at 04:21

## 2021-08-21 RX ADMIN — PANCRELIPASE 2 CAPSULE: 24000; 76000; 120000 CAPSULE, DELAYED RELEASE PELLETS ORAL at 02:04

## 2021-08-21 RX ADMIN — ONDANSETRON 4 MG: 2 INJECTION INTRAMUSCULAR; INTRAVENOUS at 14:04

## 2021-08-21 RX ADMIN — SODIUM BICARBONATE 325 MG: 325 TABLET ORAL at 18:22

## 2021-08-21 RX ADMIN — HYDROXYZINE HYDROCHLORIDE 50 MG: 25 TABLET, FILM COATED ORAL at 00:18

## 2021-08-21 RX ADMIN — PANCRELIPASE 2 CAPSULE: 24000; 76000; 120000 CAPSULE, DELAYED RELEASE PELLETS ORAL at 18:22

## 2021-08-21 RX ADMIN — Medication 1 PACKET: at 20:16

## 2021-08-21 RX ADMIN — EPOETIN ALFA-EPBX 5000 UNITS: 10000 INJECTION, SOLUTION INTRAVENOUS; SUBCUTANEOUS at 14:40

## 2021-08-21 RX ADMIN — HYDROMORPHONE HYDROCHLORIDE 1 MG: 1 SOLUTION ORAL at 00:19

## 2021-08-21 RX ADMIN — CEFEPIME HYDROCHLORIDE 1 G: 1 INJECTION, POWDER, FOR SOLUTION INTRAMUSCULAR; INTRAVENOUS at 20:15

## 2021-08-21 RX ADMIN — HYDROXYZINE HYDROCHLORIDE 50 MG: 25 TABLET, FILM COATED ORAL at 15:34

## 2021-08-21 RX ADMIN — PANCRELIPASE 2 CAPSULE: 24000; 76000; 120000 CAPSULE, DELAYED RELEASE PELLETS ORAL at 09:03

## 2021-08-21 RX ADMIN — PANCRELIPASE 2 CAPSULE: 24000; 76000; 120000 CAPSULE, DELAYED RELEASE PELLETS ORAL at 06:34

## 2021-08-21 RX ADMIN — FOLIC ACID 1 MG: 1 TABLET ORAL at 09:01

## 2021-08-21 RX ADMIN — METRONIDAZOLE 500 MG: 500 INJECTION, SOLUTION INTRAVENOUS at 12:05

## 2021-08-21 RX ADMIN — IOPAMIDOL 108 ML: 755 INJECTION, SOLUTION INTRAVENOUS at 08:59

## 2021-08-21 RX ADMIN — PANCRELIPASE 2 CAPSULE: 24000; 76000; 120000 CAPSULE, DELAYED RELEASE PELLETS ORAL at 22:29

## 2021-08-21 RX ADMIN — HYDROMORPHONE HYDROCHLORIDE 1 MG: 1 SOLUTION ORAL at 22:42

## 2021-08-21 RX ADMIN — Medication 1 PACKET: at 19:57

## 2021-08-21 RX ADMIN — Medication 1 PACKET: at 13:29

## 2021-08-21 RX ADMIN — SODIUM BICARBONATE 325 MG: 325 TABLET ORAL at 13:28

## 2021-08-21 ASSESSMENT — ACTIVITIES OF DAILY LIVING (ADL)
ADLS_ACUITY_SCORE: 18

## 2021-08-21 ASSESSMENT — MIFFLIN-ST. JEOR: SCORE: 1682.75

## 2021-08-21 NOTE — PROVIDER NOTIFICATION
Provider notified (name): Dr. Mayers.  Reason for notification: FYI- Pt BP 90/39. Also tachy all night ~120s. No VS parameters placed. Thank you (:  Recommendation/request given to provider: FYI page.  Response from provider: FYI page.

## 2021-08-21 NOTE — PLAN OF CARE
Assumed cares at 1530. Patient in HD for dialysis run and came back to unit at 1830, appeared drowsy weak and tired but opens eyes spontaneously and verbally responsive. Made comfortable in bed and TF restarted at goal rate of 50 ml/hr via J tube, G tube drained thru gravity. Biliary drain in place and clamped. VS stable. Patient also appeared tachypneic with O2 at 3l/min via NC. Claimed that he was moving around in bed towards the end of dialysis. Encouraged deep breathing exercises which offered some relief. Resting in bed as of this time. Observed closely.    P: Administer Abx as ordered. Continue to monitor patient and follow plan of care.

## 2021-08-21 NOTE — PROGRESS NOTES
[facility]  Progress Note - Marg[place] 3 Service        Date of Admission:  6/28/2021    Assessment & Plan   Dax Villareal is a 30 year old male with hx of alcohol use disorder admitted on 6/28/2021 after recent prolonged admission at [facility] in [place] who has severe acute alcoholic hepatitis c/b HE, ESRD requiring HD, and malnutrition in the setting of acute necrotizing pancreatitis, s/p PEG-J placement on 7/13. He arrived with acute respiratory failure and septic shock that have resolved, and his secondary bacterial peritonitis has been appropriately treated. He requires continued management for ESRD, hepatic encephalopathy, infected pancreatic pseudocyst, nutritional support via PEG-J, and persistent right pleural effusion. Clinically improving after multiple necrosectomies.     Today:  - AM CBC 6.9, 1 unit(s) of pRBC ordered and CTA ordered with hemoperitneum seen on paracentesis yesterday. CTA negative for bleed or paracolic blood. Hepatic abscess not changed in size. Continue current ABx  - Dialysis today    # Necrotizing alcoholic pancreatitis w/ infected pseudocyst s/p RP Drain and Cystogastrostomy  # Septic Shock, resolved   # Leukocytosis    Admitted to the [facility] ICU on 6/28/2021 from [facility] in [place] for septic shock due to necrotizing pancreatitis. CT on admit showing mature collection with enhancing wall measuring ~[id number]. IR placed perc drain 6/29. Repeat imaging 7/18 revealed walled off necrotic collection amenable to endoscopic transluminal drainage with cystgastrostomy stenting with necrosectomy on 7/21/21.  7/28 - EGD, stent replacement (transluminal Axios with coaxial Solus), PEG-J removal (buried bumper) and replacement of new PEG-J with one new T-tag. Repeat necrosectomy and Axios removal/replacement Friday 8/6. Repeat CT on 8/9. 8/11 - EGD with completion necrosectomy, no e/o bowel fistula, no plans for further necrosectomy unless  further pain/fevers warrants further evaluation. RP drain capped with plans for reimaging on 8/23/2021     - Continue Cefepime 1 gm Q 24 + flagyl, previously recommended to continue until removal of RP drain  - Daily CBC, watching fever curve  - GI following, appreciate recs              - Pancreatic enzymes   - Flush drain with 100ml q4hr per GI   - Blood cultures and fluid cultures NGTD    # Secondary Bacterial Peritonitis 2/2 infected pseudocyst  # Peripancreatic fluid collection growing VRE   7/2 - Diagnostic paracentesis (WBC 2663 - 74% PMN, SAAG <1.1, protein 4.1)     Antibiotics:   Current: Cefepime 1 gm Q 24 + flagyl 500 q8 (8/6-present)     Past:  - Linezolid 7/21-7/30  - Vancomcyin 6/29  - Meropenem 6/29-7/18  - Micafungin 6/29-7/1  - Cipro 7/19-7/21  - Ceftriaxone 7/21-7/24; 7/26-7/27  - Zosyn 7/27-8/6    - Paracentesis PRN      # Right hepatic/pancreatic hydrothorax  Intermittently increased subjective shortness of breath. CXR on 7/19 w/ increased RLL haziness; ddx includes pleural effusion v pneumonia v atelectasis. CT on 7/28 showed large volume R hepatic hydrothorax with complete atelectasis of right lung, bedside thora completed 7/28 with labs and cultures.     CXR 7/30 showed moderate R pleural effusion, unchanged from previous and improved aeration of right lung with small remaining pleural effusion on CT. Thora cultures negative. Repeat Thora on 8/9 showing exudative fluid     - IR completed thoracentesis 7/28 - exudative  - ID recommended continued empiric abx, though pleural effusion most likely 2/2 hepatic hydrothorax and pancreatitis. Defer plan for repeat thoracentesis based on symptoms and oxygen requirement. No plan for chest tube given concern for infectious nidus  - Supplemental oxygen as needed to keep sats above 89%  - Repeat Thoracentesis PRN for dyspnea    #Hepatic Abscess  CT Ab/pelvis from 8/13 with developing hepatic abscess vs. infarct. IR consulted. No intervention for abscesses  "<5 cm and appears more likely infarct rather than abscess per IR. Risk of procedural intervention outweighs benefit at this point.   - Will reassess with future imaging on 823.  - Blood cultures from 8/13 NGTD  - Para & thora differed per GI  - Continuing current antibiotics     #Hematochezia, resolved  Patient CTA-Chest on 8/8 with \"possible\" non-obstructive PE in [facility] Pulmonary Artery Branch. Patient has largely been bed bound and not on DVT prophylaxis for several days, especially in setting of recent procedures. Troponin negative x2. BRBPR with heparin. Ultrasounds negative for DVT.  - CTA-Chest on 8/10 negative for PE, no indication for anticuagulation  - Hematology recommends starting prophylactic dose anticoagulation once stable x1 week, stepping up to unfractionated heparin, likely will need 3 months of anticoagulation given hypercoagulable state of necrotizing pancreatitis   - Heme to be contacted prior to discharge when ready to transition to orals    # Lactic acidosis  Multifactorial d/t decreased lactic clearance in the setting of acute hepatic failure, ESRD, and acute on chronic infection.  - Continue to trend and assess vitals     # Gastric Outlet Obstruction  High G tube output due to the cystogastric connection/fluid drainage noted on 8/5. Per GI, possibly related to gastric outlet from severe abdominal inflammation and expected. KUB negative for obstructive gas pattern. Ileus from narcotics considered, but output not consistent with tube feeds. Improved after cystogastrectomy  -Anti-emetics  -Will monitor and continue to discuss with GI  -Having fluid removal per nephrology due to HD    #Hypokalemia, resolved  High GI output state. Replete as needed with ESRD noted. Improved with decreased G-tube drainage after cystogastrectomy.    # Severe malnutrition in setting of chronic illness  PEG-J placed 7/13. Patient intermittently refusing tube feeds d/t nausea/vomiting. Improving oral intake s/p " necresectomies.  - Dietitian consult for new tube feeds, appreciate recs   - Continuous TF per dietician order with goal of 65 ml/hr              - Cont panc enzymes   - Will trial regular diet with level 0 liquids as below  - Regular Diet, thin liquids per SLP  - VFSS for 8/10 to evaluate for dysphagia - cleared for level 0 liquids though vomited following  - Continue multivitamins  - Pantoprazole 40 mg IV daily  - Zofran PRN    Malnutrition:    - Level of malnutrition: Severe   - Based on: moderate/severe subcutaneous fat loss, moderate/severe muscle loss     # Alcoholic hepatitis complicated by portal hypertension, ascites, and HE - improving   # Hx of Alcohol use disorder c/b withdrawal   # Anemia  # Coagulopathy   Patient with a history of marked alcohol use and alcoholic hepatitis in the past. Was given a course of steroids (1 week) during previous admission at OSH. Patient mental status has been stable, alert and oriented x4 after rifaxamin and aggressive lactulose. Not a liver transplant candidate.   - Lactulose 20g daily and PRN and rifaximin 550mg BID - as patient allows  - Daily folic acid and thiamine  - Delirium precautions  - Melatonin 6mg at bedtime  - Daily CBC   - Transfuse if hemoglobin < 7, platelet < 10 or < 30 with bleeding  - Continue to monitor for signs of bleeding      # Stage III RADHA on HD due to ATN  # ESRD due to ATN  # Hypervolemia causing acute respiratory failure, resolved  Nephrology note 7/21 indicates that patient has been on renal replacement therapy for two months and pt status has most likely transitioned from ARF to ESRD at this point.      > Hemodialysis as per renal reccs - TTS              - Midodrine 10 mg PRN before dialysis sessions     > Daily chemistry labs, strict I/O, and daily weights  - Aspiration precautions  - Supplemental oxygen as needed to keep sats above 89%    # Anemia  Baseline hgb 17. Running between 6-8 in setting of chronic illness, new ESRD with epocrit  per Nephrology, frequent lab draws and procedures.  - Goal hgb >7.0, trend Hgb    # Intermittent lactic acidosis  Suspect due to poor lactate clearance due to ESRD and ESLD + ongoing inflammation/infection. No fluids given pt is on dialysis; however, did receive Albumin last night.    # Apathy  Patient is intermittently refusing cares due to wanting a 'break,' but also reaffirms that he wants everything done to prolong his life. Mood is depressed, apathetic at times. Patient has multiple stressors including his own medical conditions and family health issues.  - Continue to offer emotional support services such as psychiatry, palliative,   - He declined psychiatry/psychology visits     # Goals of Care  Poor prognosis with multiorgan failure and non-compliance with treatment team. Palliative care and care conference on 7/19. Patient not participatory. Prognosis is gaurded with long course to rehab  - Hepatology confirmed pt will not be a transplant candidate  - Daily phone updates to sister Noni ([phone])       Diet: Regular Diet Adult  Adult Formula Drip Feeding: Continuous Vital 1.5; Jejunostomy; Goal Rate: 50; mL/hr; Medication - Feeding Tube Flush Frequency: At least 15-30 mL water before and after medication administration and with tube clogging; Amount to Send (Nutrition us...    DVT Prophylaxis: Pneumatic Compression Devices  Rdz Catheter: Not present  Fluids: 10 mL NS TKO  Central Lines: PRESENT  PICC Triple Lumen 06/28/21 Left-Site Assessment: WDL  CVC Double Lumen Right-Site Assessment: WDL  Code Status: Full Code      Disposition Plan   Expected discharge: >7 days, recommended to transitional care unit once  nutrition improved, infected pancreatic pseudocyst is well managed and plan for dialysis is in place.       Johny Oritz MD  [phone]  [address]  Securely message with the Vocera Web Console (learn more here)  Text page via  [facility] Paging/Directory  Please see sign in/sign out for up to date coverage information  _____________________________________________________________________    Interval History    No worsening abd pain, no bloody stools, ongoing loose stools x1. Continues on tube feeds    Physical Exam   Vital Signs: Temp: 97.5  F (36.4  C) Temp src: Oral BP: 103/46 Pulse: 117   Resp: 20 SpO2: 93 % O2 Device: Nasal cannula Oxygen Delivery: 4 LPM  Weight: 177 lbs 7.52 oz  Constitutional: Awake, non-diaphoretic, conversational regarding upcoming surgery plans  HEENT: Normocephalic. Atraumatic. No scleral icterus.  Cardiovascular: tachycardic, tender chest to palpation diffusely  Respiratory: Tachypnic, Decreased breath sounds on right, NC on for comfort  Abdomen: Tolerates palpation with minimal pain, PEG-tube site clean, RUQ tenderness  Musculoskeletal: Emaciated, moving all extremities on examination  Skin: no Jaundice    Data   Recent Labs   Lab 08/21/21  0555 08/21/21  0524 08/20/21  2137 08/20/21  1949 08/20/21  1726 08/20/21  0610 08/19/21  0647 08/15/21  1103   WBC  --  12.3*  --  13.0*  --  13.7* 11.7*  --    HGB  --  6.9*  --  7.2*  --  7.4* 7.4*  --    MCV  --  97  --  93  --  94 92  --    PLT  --  337  --  329  --  337 328  --    INR  --   --   --   --   --  1.88*  --  1.86*   *  --   --   --   --  132* 131*  --    POTASSIUM 4.4  --   --   --   --  3.9 4.6  --    CHLORIDE 96  --   --   --   --  100 98  --    CO2 26  --   --   --   --  25 24  --    BUN 30  --   --   --   --  20 34*  --    CR 2.14*  --   --   --   --  1.62* 2.34*  --    ANIONGAP 7  --   --   --   --  7 9  --    JANENE 8.2*  --   --   --   --  7.9* 8.1*  --    *  --  123*  --  102* 128* 135*  --    ALBUMIN 1.4*  --   --   --   --  1.4* 1.4*  --    PROTTOTAL 6.9  --   --   --   --  6.9 6.9  --    BILITOTAL 1.6*  --   --   --   --  1.5* 1.6*  --    ALKPHOS 101  --   --   --   --  111 111  --    ALT 19  --   --   --   --  20 20  --    AST 43  --   --    --   --  40 39  --

## 2021-08-21 NOTE — PLAN OF CARE
[id number]    Neuro: A&Ox4; sclera yellow  GI: abdomen distended with G tube to gravity and intermittent meds through.  Large amount brown output; J tube infusing 50mL/hr TF with 60mL q4hr flushes.  Encouraged oral intake but only had ice chips.  Brown emesis 100mL brown today. Zofran PRN x1.  Bowel sounds present x4; Education on head elevation with TF; loose BM incontinent x3  : on HD; went to HD at 1400 today  Resp: tachypnea and shallow breathing noted. VSS on 4L NC; encouraged cough and deep breathing; diminished lung sounds  Mobility: x1 assist repositioning in bed; x2 gait belt out of bed.  Encouraged repositioning with education.  Provider notified. Pain at peritoneal tube site; repositioned with dressing change, so able to reposition between supine and R side laying.  Cardiac: tachycardia noted  Skin: pale and jaundiced; blanchable redness on bottom; bilateral heels peeling  Lines/Drains: L triple lumen PICC SL; grey line occluded; GJ tube; L peritoneal drain clamped.  Pain: PRN dilaudid given x1 for back pain; discomfort noted but pain mostly covered by scheduled oxy  Behavior: calm; can be demanding with staff at times.    Plan of Care: Pt given 1 unit RBC; abdominal CT done; no s/s of bleeding.  Continue cares and assessments per provider instruction monitor for changes.

## 2021-08-21 NOTE — PROGRESS NOTES
HEMODIALYSIS TREATMENT NOTE    Date: 8/21/2021  Time: 6:39 PM    Data:  Pre Wt: 78.5 kg (173 lb 1 oz)   Desired Wt: 76.5 kg   Post Wt: 76.5 kg (168 lb 10.4 oz)  Weight change: 2 kg  Ultrafiltration - Post Run Net Total Removed (mL): 2000 mL  Vascular Access Status: CVC  patent  Dialyzer Rinse: Clear  Total Blood Volume Processed: 65.61 L   Total Dialysis (Treatment) Time: 3 hrs   Dialysate Bath: K 3, Ca 3  Heparin: None    Lab:   No    Interventions:  Changed dsg to CVC.    Assessment:  Took over run about [facility] through from Paulette MADRIGAL RN. Pt resting in bed. VSS. Tolerating pulling 2L UF. Sleeping off and on. Completed run without issue. CVC NS locked; new caps. Report given to primary RN. Took pt up to room per self.      Plan:    Next run per renal team.     Doug Perales, DERRICKN, RN

## 2021-08-21 NOTE — PROGRESS NOTES
"Nephrology Progress Note  08/21/2021         Dax Villareal is a 30 year old male with h/o ETOH hepatitis, end stage liver disease, RADHA on HD, transferred from [facility] in [place] for septic shock on 6/28/21 for treatment of necrotizing pancreatitis and decompensated liver disease. He was initially admitted to [facility] 5/19/21 and started on RRT 5/26/2021.        Interval History :   Last dialysis 8/19/2021, tolerated without issues. No fevers, breathing comfortably, no vomiting, no edema. Poor appetite, getting tube feeds.      Assessment & Recommendations:   RADHA nearing ESRD-Cr was 0.8 as recently as 5/19/2021 but now HD dependent for 3 months, started RRT at [facility] prior to transfer to [facility] on 5/26.  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  continuing restorative cares for now, BP's have been stable enough to support HD.  Had GI stenting of pancreas x4.                 -Line is TDC from PTA               - HD on TTS schedule unless issues arise.   - HD today     Anemia - hgb 6.9 this am, primary team requesting redraw at dialysis.     Nutrition-tube feeds, per nursing, not eating though he has many snack options in his room     Discussed with Dr. Angel Brown MD  [facility]  Department of Medicine  Division of Renal Disease and Hypertension  [facility]  antonio Mccray Web Console     Review of Systems:   I reviewed the following systems:  Gen: No fevers or chills  CV: No CP at rest  Resp: No SOB at rest  GI: No N/V    Physical Exam:   I/O last 3 completed shifts:  In: 3470 [P.O.:720; I.V.:320; NG/GT:1330]  Out: 2000 [Urine:50; Emesis/NG output:1950]   /58   Pulse (!) 123   Temp 97.9  F (36.6  C) (Oral)   Resp 18   Ht 1.676 m (5' 6\")   Wt 78.5 kg (173 lb 1 oz)   SpO2 96%   BMI 27.93 kg/m       GENERAL APPEARANCE: Lying in bed, RR shallow and he is coughing " frequently  EYES:  scleral icterus, pupils equal  Pulmonary: normal work of breathing   CV: no edema  NEURO: alert/interactive  ACCESS: Right TDC    Labs:   All labs reviewed by me  Electrolytes/Renal -   Recent Labs   Lab Test 08/21/21  1136 08/21/21  0555 08/20/21  2137 08/20/21  0610 08/19/21  0647 08/19/21  0647   NA  --  129*  --  132*  --  131*   POTASSIUM  --  4.4  --  3.9  --  4.6   CHLORIDE  --  96  --  100  --  98   CO2  --  26  --  25  --  24   BUN  --  30  --  20  --  34*   CR  --  2.14*  --  1.62*  --  2.34*   * 125* 123* 128*   < > 135*   JANENE  --  8.2*  --  7.9*  --  8.1*   MAG  --  1.6  --  1.6  --  1.7   PHOS  --  4.2  --  3.2  --  4.3    < > = values in this interval not displayed.       CBC -   Recent Labs   Lab Test 08/21/21  1352 08/21/21  0524 08/20/21  1949   WBC 13.2* 12.3* 13.0*   HGB 7.8* 6.9* 7.2*    337 329       LFTs -   Recent Labs   Lab Test 08/21/21  0555 08/20/21  0610 08/19/21  0647   ALKPHOS 101 111 111   BILITOTAL 1.6* 1.5* 1.6*   ALT 19 20 20   AST 43 40 39   PROTTOTAL 6.9 6.9 6.9   ALBUMIN 1.4* 1.4* 1.4*       Iron Panel -   Recent Labs   Lab Test 08/14/21  2055 07/19/21  0632   IRON 28* 31*   IRONSAT 21 43   JERRELL 2,186*  --            Current Medications:    sodium bicarbonate  325 mg Per Feeding Tube Q4H    And     amylase-lipase-protease  1-2 capsule Per Feeding Tube Q4H     amylase-lipase-protease  2 capsule Oral TID w/meals     B and C vitamin Complex with folic acid  5 mL Per Feeding Tube Daily     banatrol plus  1 packet Per Feeding Tube TID     ceFEPIme (MAXIPIME) IV  1 g Intravenous Q24H     folic acid  1 mg Oral Daily     melatonin  6 mg Oral At Bedtime     menthol   Transdermal Q8H     metroNIDAZOLE  500 mg Intravenous Q8H     oxyCODONE  5 mg Oral Q8H     protein modular  1 packet Per Feeding Tube BID     sodium chloride (PF)  3 mL Intracatheter Q8H     sodium chloride (PF)  73 mL Intravenous Once     sodium chloride (PF)  86 mL Intravenous Once     sodium  chloride (PF)  9 mL Intracatheter During Dialysis/CRRT (from stock)     sodium chloride (PF)  9 mL Intracatheter During Dialysis/CRRT (from stock)     vitamin B1  100 mg Oral Daily    Or     thiamine  100 mg Intravenous Daily       - MEDICATION INSTRUCTIONS -       dextrose

## 2021-08-21 NOTE — PLAN OF CARE
"[facility] Cares [id number]    /46 (BP Location: Left arm)   Pulse 117   Temp 97.5  F (36.4  C) (Oral)   Resp 20   Ht 1.676 m (5' 6\")   Wt 80.5 kg (177 lb 7.5 oz)   SpO2 93%   BMI 28.64 kg/m      Ad hx: Alcohol withdrawal and multiple organ failures; necrotizing pancreatitis.  Med hx: Alcohol abuse.  Pain: C/o abdominal and back pain managed w/PRN q4h PO Dilaudid.  Vitals: Tachycardia and tachypnea noted. Some hypotension noted. Afebrile.  Neuro: A&Ox4. Daily PRN Atarax given x1 for anxiety.  Respiratory: On 4 LPM NC; satting at 94% overnight. Tachypnea present; lung sounds diminished. Reports RODNEY. Denies SOB, but pt has visible intercostal retractions and accessory muscle use.  Cardiac/Neurovascular: Tachycardia present overnight. Some hypotension noted. Denies numbness/tingling. Trace dependent edema present.  GI/: On HD. Loose, watery BMs; incontinent, but occasionally calls for bedpan. Distended/firm abdomen. C/o intermittent nausea managed w/PRN q6h Zofran x1. GJ tube WNL; tube feedings through J tube @ 50 mL/hr w/q4h 60 mL flushes. G tube to gravity drainage.  Nutrition: Regular diet; poor appetite. Continuous tube feedings @ 50 mL/hr w/q4h 60 mL flushes.  Activity: Lift assist.  Skin: Jaundiced. Blanchable redness on buttocks. BL heels peeling. Excoriation on sacrum.  Lines: Left triple lumen PICC; red inffusing NS @ TKO w/intermittent abx; white saline locked; grey occluded. GJ tube. Left peritoneal drain WNL.  Events this shift: Pt triggered sepsis yesterday evening; lactic resulted 1.7. Tachycardic and tachypnic throughout shift. At 0200 RN noticed tube feeding pump setting for flushes were set to 500 mL/hr. Pt most likely received 1-2 of these 500 mL flushes from the beginning of the shift. Pt to receive dialysis today; day RN notified of possible need to pass this along to dialysis team. Pain managed w/Dilaudid. Hgb this AM 6.9. Resting quietly between cares.    Plan: Continue to monitor and " notify MD of any changes in pt status.    Valentine Mcdonald RN on 8/21/2021 7:00 AM

## 2021-08-22 LAB
ALBUMIN SERPL-MCNC: 1.6 G/DL (ref 3.4–5)
ALP SERPL-CCNC: 101 U/L (ref 40–150)
ALT SERPL W P-5'-P-CCNC: 20 U/L (ref 0–70)
ANION GAP SERPL CALCULATED.3IONS-SCNC: 7 MMOL/L (ref 3–14)
AST SERPL W P-5'-P-CCNC: 46 U/L (ref 0–45)
BILIRUB SERPL-MCNC: 1.8 MG/DL (ref 0.2–1.3)
BUN SERPL-MCNC: 21 MG/DL (ref 7–30)
CALCIUM SERPL-MCNC: 8.7 MG/DL (ref 8.5–10.1)
CHLORIDE BLD-SCNC: 96 MMOL/L (ref 94–109)
CO2 SERPL-SCNC: 28 MMOL/L (ref 20–32)
CREAT SERPL-MCNC: 1.72 MG/DL (ref 0.66–1.25)
ERYTHROCYTE [DISTWIDTH] IN BLOOD BY AUTOMATED COUNT: 17.8 % (ref 10–15)
GFR SERPL CREATININE-BSD FRML MDRD: 52 ML/MIN/1.73M2
GLUCOSE BLD-MCNC: 85 MG/DL (ref 70–99)
HCT VFR BLD AUTO: 24.3 % (ref 40–53)
HGB BLD-MCNC: 8 G/DL (ref 13.3–17.7)
LACTATE SERPL-SCNC: 1.4 MMOL/L (ref 0.7–2)
MAGNESIUM SERPL-MCNC: 1.7 MG/DL (ref 1.6–2.3)
MCH RBC QN AUTO: 29.7 PG (ref 26.5–33)
MCHC RBC AUTO-ENTMCNC: 32.9 G/DL (ref 31.5–36.5)
MCV RBC AUTO: 90 FL (ref 78–100)
PHOSPHATE SERPL-MCNC: 3.7 MG/DL (ref 2.5–4.5)
PLATELET # BLD AUTO: 327 10E3/UL (ref 150–450)
POTASSIUM BLD-SCNC: 4.1 MMOL/L (ref 3.4–5.3)
PROT SERPL-MCNC: 7.4 G/DL (ref 6.8–8.8)
RBC # BLD AUTO: 2.69 10E6/UL (ref 4.4–5.9)
SODIUM SERPL-SCNC: 131 MMOL/L (ref 133–144)
WBC # BLD AUTO: 14.5 10E3/UL (ref 4–11)

## 2021-08-22 PROCEDURE — 83605 ASSAY OF LACTIC ACID: CPT | Performed by: STUDENT IN AN ORGANIZED HEALTH CARE EDUCATION/TRAINING PROGRAM

## 2021-08-22 PROCEDURE — 82040 ASSAY OF SERUM ALBUMIN: CPT | Performed by: INTERNAL MEDICINE

## 2021-08-22 PROCEDURE — 250N000013 HC RX MED GY IP 250 OP 250 PS 637

## 2021-08-22 PROCEDURE — 36592 COLLECT BLOOD FROM PICC: CPT | Performed by: INTERNAL MEDICINE

## 2021-08-22 PROCEDURE — 84100 ASSAY OF PHOSPHORUS: CPT | Performed by: INTERNAL MEDICINE

## 2021-08-22 PROCEDURE — 250N000011 HC RX IP 250 OP 636: Performed by: STUDENT IN AN ORGANIZED HEALTH CARE EDUCATION/TRAINING PROGRAM

## 2021-08-22 PROCEDURE — 85014 HEMATOCRIT: CPT | Performed by: INTERNAL MEDICINE

## 2021-08-22 PROCEDURE — 250N000013 HC RX MED GY IP 250 OP 250 PS 637: Performed by: STUDENT IN AN ORGANIZED HEALTH CARE EDUCATION/TRAINING PROGRAM

## 2021-08-22 PROCEDURE — 99207 PR CDG-CUT & PASTE-POTENTIAL IMPACT ON LEVEL: CPT | Performed by: INTERNAL MEDICINE

## 2021-08-22 PROCEDURE — 250N000013 HC RX MED GY IP 250 OP 250 PS 637: Performed by: INTERNAL MEDICINE

## 2021-08-22 PROCEDURE — 36592 COLLECT BLOOD FROM PICC: CPT | Performed by: STUDENT IN AN ORGANIZED HEALTH CARE EDUCATION/TRAINING PROGRAM

## 2021-08-22 PROCEDURE — 80053 COMPREHEN METABOLIC PANEL: CPT | Performed by: INTERNAL MEDICINE

## 2021-08-22 PROCEDURE — 99233 SBSQ HOSP IP/OBS HIGH 50: CPT | Mod: GC | Performed by: INTERNAL MEDICINE

## 2021-08-22 PROCEDURE — 83735 ASSAY OF MAGNESIUM: CPT | Performed by: INTERNAL MEDICINE

## 2021-08-22 PROCEDURE — 120N000002 HC R&B MED SURG/OB UMMC

## 2021-08-22 RX ADMIN — Medication 1 PACKET: at 22:57

## 2021-08-22 RX ADMIN — Medication 1 PACKET: at 07:47

## 2021-08-22 RX ADMIN — HYDROMORPHONE HYDROCHLORIDE 1 MG: 1 SOLUTION ORAL at 02:53

## 2021-08-22 RX ADMIN — PANCRELIPASE 2 CAPSULE: 24000; 76000; 120000 CAPSULE, DELAYED RELEASE PELLETS ORAL at 11:30

## 2021-08-22 RX ADMIN — Medication 1 PACKET: at 22:41

## 2021-08-22 RX ADMIN — MELATONIN TAB 3 MG 6 MG: 3 TAB at 22:40

## 2021-08-22 RX ADMIN — METRONIDAZOLE 500 MG: 500 INJECTION, SOLUTION INTRAVENOUS at 04:58

## 2021-08-22 RX ADMIN — PANCRELIPASE 2 CAPSULE: 24000; 76000; 120000 CAPSULE, DELAYED RELEASE PELLETS ORAL at 07:48

## 2021-08-22 RX ADMIN — Medication 5 ML: at 07:47

## 2021-08-22 RX ADMIN — Medication 1 PACKET: at 07:48

## 2021-08-22 RX ADMIN — SODIUM BICARBONATE 325 MG: 325 TABLET ORAL at 18:25

## 2021-08-22 RX ADMIN — METRONIDAZOLE 500 MG: 500 INJECTION, SOLUTION INTRAVENOUS at 22:41

## 2021-08-22 RX ADMIN — CYCLOBENZAPRINE 10 MG: 10 TABLET, FILM COATED ORAL at 00:26

## 2021-08-22 RX ADMIN — SODIUM BICARBONATE 325 MG: 325 TABLET ORAL at 11:30

## 2021-08-22 RX ADMIN — FOLIC ACID 1 MG: 1 TABLET ORAL at 07:47

## 2021-08-22 RX ADMIN — PANCRELIPASE 2 CAPSULE: 24000; 76000; 120000 CAPSULE, DELAYED RELEASE PELLETS ORAL at 02:53

## 2021-08-22 RX ADMIN — SODIUM BICARBONATE 325 MG: 325 TABLET ORAL at 07:46

## 2021-08-22 RX ADMIN — SODIUM BICARBONATE 325 MG: 325 TABLET ORAL at 02:53

## 2021-08-22 RX ADMIN — SODIUM BICARBONATE 325 MG: 325 TABLET ORAL at 13:57

## 2021-08-22 RX ADMIN — PANCRELIPASE 2 CAPSULE: 24000; 76000; 120000 CAPSULE, DELAYED RELEASE PELLETS ORAL at 13:57

## 2021-08-22 RX ADMIN — PANCRELIPASE 2 CAPSULE: 24000; 76000; 120000 CAPSULE, DELAYED RELEASE PELLETS ORAL at 18:25

## 2021-08-22 RX ADMIN — CEFEPIME HYDROCHLORIDE 1 G: 1 INJECTION, POWDER, FOR SOLUTION INTRAMUSCULAR; INTRAVENOUS at 22:41

## 2021-08-22 RX ADMIN — SODIUM BICARBONATE 325 MG: 325 TABLET ORAL at 22:40

## 2021-08-22 RX ADMIN — Medication 1 PACKET: at 13:58

## 2021-08-22 RX ADMIN — THIAMINE HCL TAB 100 MG 100 MG: 100 TAB at 07:47

## 2021-08-22 RX ADMIN — OXYCODONE HYDROCHLORIDE 5 MG: 5 SOLUTION ORAL at 04:57

## 2021-08-22 RX ADMIN — OXYCODONE HYDROCHLORIDE 5 MG: 5 SOLUTION ORAL at 22:40

## 2021-08-22 RX ADMIN — OXYCODONE HYDROCHLORIDE 5 MG: 5 SOLUTION ORAL at 11:31

## 2021-08-22 RX ADMIN — METRONIDAZOLE 500 MG: 500 INJECTION, SOLUTION INTRAVENOUS at 11:31

## 2021-08-22 RX ADMIN — PANCRELIPASE 2 CAPSULE: 24000; 76000; 120000 CAPSULE, DELAYED RELEASE PELLETS ORAL at 22:40

## 2021-08-22 ASSESSMENT — ACTIVITIES OF DAILY LIVING (ADL)
ADLS_ACUITY_SCORE: 18

## 2021-08-22 ASSESSMENT — MIFFLIN-ST. JEOR: SCORE: 1647.75

## 2021-08-22 NOTE — PLAN OF CARE
[id number]    A&Ox4; sclera yellow.  Attempted to keep awake this shift, but found sleeping at times between cares. Abdomen distended with G tube to gravity with good output. J tube infusing TF at 50mL/hr with 60mL flushes q4hr.  Encouraged oral intake but had ice chips and few sips of mountain dew.  Green emesis x1; declined zofran.  Bowel sounds x4; multiple small, loose and soft BM noted.  Educated on head elevation with TF running. No urine output; on HD.  Tachypnea and shallow breathing noted.  VSS on 4L NC.  Encouraged cough and deep breathing.  x1 assist repositioning; x2 assist with lift out of bed.  Encouraged repositioning.  Preferred R side and back instead of L repositioning.  Skin pale and jaundiced with blanchable redness on bottom.  Bilateral heels and abdomen peeling; lotion applied.  L triple lumen PICC infusing TKO on red and white lines.  Grey line occluded.  Providers aware. Caps changed today. GJ tube patent with dressing changed per POC. Small amount of pearly mucous at site.  L peritoneal drain clamped with dressing changed.  Dried blood noted at insertion site. Discomfort noted but denied pain medication; requested ice packs.  Attempted to keep awake this shift. Can be short/demanding with staff; otherwise calm.  Continue to monitor for s/s of bleeding, manage pain, and encourage oral intake.  Encourage awake during day.  Continue cares and assessments per provider instruction; monitor for changes.

## 2021-08-22 NOTE — PROGRESS NOTES
[facility]  Progress Note - [place] 3 Service        Date of Admission:  6/28/2021    Assessment & Plan   Dax Villareal is a 30 year old male with hx of alcohol use disorder admitted on 6/28/2021 after recent prolonged admission at [facility] in [place] who has severe acute alcoholic hepatitis c/b HE, ESRD requiring HD, and malnutrition in the setting of acute necrotizing pancreatitis, s/p PEG-J placement on 7/13. He arrived with acute respiratory failure and septic shock that have resolved, and his secondary bacterial peritonitis has been appropriately treated. He requires continued management for ESRD, hepatic encephalopathy, infected pancreatic pseudocyst, nutritional support via PEG-J, and persistent right pleural effusion. Clinically improving after multiple necrosectomies.     Today:  - Hgb stable, no acute changes,CT ordered for tomorrow on 8/23 with to follow-up plan with IR regarding RP drain removal    Septic Shock  Necrotizing alcoholic pancreatitis w/ infected pseudocyst  Secondary Bacterial Peritonitis 2/2 infected pseudocyst with VRE  Admitted to the [facility] ICU on 6/28/2021 from [facility] in [place] for septic shock due to necrotizing pancreatitis. CT on admit showing mature collection with enhancing wall measuring ~[id number]. IR placed perc drain 6/29. Repeat imaging 7/18 revealed walled off necrotic collection amenable to endoscopic transluminal drainage with cystgastrostomy stenting with necrosectomy on 7/21/21.              7/2 - Diagnostic paracentesis (WBC 2663 - 74% PMN, SAAG <1.1, protein 4.1) and amylase, peritoneal fluid cultures negative               7/28 - EGD, stent replacement (transluminal Axios with coaxial Solus), PEG-J removal (buried bumper) and replacement of new PEG-J with one new T-tag              8/6 - Repeat necrosectomy and Axios removal/replacement              8/11 - EGD with completion necrosectomy     Abx:  Linezolid  7/21-7/30, Meropenem 6/29-7/18, Ceftriaxone 7/21-7/24; 7/26-7/27 Zosyn 7/27-8/6  BCx: NGTD since 5/18/2021     Gastic Outlet Obstruction  High G tube output due to the cystogastric connection/fluid drainage noted on 8/5. Per GI, possibly related to gastric outlet from severe abdominal inflammation and is expected. KUB negative for obstructive gas pattern. Ileus from narcotics considered, but output not consistent with tube feeds. Nausea and vomiting improved after cystogastrectomy.     Right Pancreatic Hydrothorax  Increased dyspnea with CXR on 7/19 with further imaging and thoracentesis confirming a hepatic/pancreatic hydrothorax. Thoracentesis PRN for dyspnea.     Alcoholic Hepatitis  Coagulation Defect  Received 1 week of steroids at [facility] for elevated INR and altered mental status. Not a liver transplant candidate. Mental status improved with aggressive lactulose and rifaxamin, which was discontinued without return of AMS.     Hepatic Abscess  CT Ab/pelvis from 8/13 and 8/21 showing a 3.0 x 2.7 cm hepatic abscess. IR consulted and recommended continue ABx without intervention.     ESRD due to ATN  Stage III RADHA due to ATN from septic shock without recovery for >3 months. Line is TDC from [facility] Ne[facility] dialyzed on Tu, Th, Sa schedule.  - Will need follow-up and outpatient dialysis     Anemia due to Chronic Illness  Hematochezia  Hemoperitoneum  Baseline hgb 17. Running between 6-8 in setting of chronic illness, new ESRD with epocrit per Nephrology, frequent lab draws and procedures. Hematochezia on 8/8 after starting standard intensity heparin. Paracentesis on 8/21 with hemoperitoneum.  - Goal hgb >7.0, trend Hgb     Alcohol Use Disorder  Counseled patient on complete alcohol cessation. Not interested in additional services at this time. Continue daily folic acid and thiamine.     Lactic Acidosis  Intermittent Lactic acidosis in the setting of sepsis, ESRD, bleeding,     Hypokalemia  Noted in the setting of  high gastric output and improved once G-tube output decreased     Severe Malnutrition  Poor oral intake in the setting of necrotizing pancreatitis. PEG tube placed 7/13. Dietitian consulted and continuous tube feeds with goal of 65 ml/hr with continuous pancreatic enzymes  - Continuous tube feeds decreased to 50 mL overnight.       Diet: Regular Diet Adult  Adult Formula Drip Feeding: Continuous Vital 1.5; Jejunostomy; Goal Rate: 50; mL/hr; Medication - Feeding Tube Flush Frequency: At least 15-30 mL water before and after medication administration and with tube clogging; Amount to Send (Nutrition us...    DVT Prophylaxis: Pneumatic Compression Devices  Rdz Catheter: Not present  Fluids: 10 mL NS TKO  Central Lines: PRESENT  PICC Triple Lumen 06/28/21 Left-Site Assessment: WDL except  CVC Double Lumen Right-Site Assessment: WDL  Code Status: Full Code      Disposition Plan   Expected discharge: >7 days, recommended to transitional care unit or LTACH once  nutrition improved, infected pancreatic pseudocyst is well managed and plan for dialysis is in place.       Johny Ortiz MD  [phone]  [address]  Securely message with the Vocera Web Console (learn more here)  Text page via [employer] Paging/Directory  Please see sign in/sign out for up to date coverage information  _____________________________________________________________________    Interval History    Pain controlled, out of bed x2, loose bm x3, brown emesis 100 mL x1. Feeds at 50 mL/hr    Physical Exam   Vital Signs: Temp: 98.6  F (37  C) Temp src: Oral BP: 101/50 Pulse: 120   Resp: 24 SpO2: 95 % O2 Device: Nasal cannula Oxygen Delivery: 4 LPM  Weight: 173 lbs .98 oz  Constitutional: Awake, non-diaphoretic, conversational regarding upcoming surgery plans  HEENT: Normocephalic. Atraumatic. No scleral icterus.  Cardiovascular: tachycardic, tender chest to palpation diffusely  Respiratory: Tachypnic,  Decreased breath sounds on right, NC on for comfort  Abdomen: Tolerates palpation with minimal pain, PEG-tube site clean, RUQ tenderness  Musculoskeletal: Emaciated, moving all extremities on examination  Skin: no Jaundice    Data   Recent Labs   Lab 08/22/21  0540 08/21/21  1352 08/21/21  1136 08/21/21  0555 08/21/21  0524 08/20/21  1726 08/20/21  0610 08/16/21  0533 08/15/21  1103   WBC 14.5* 13.2*  --   --  12.3*   < > 13.7*  --   --    HGB 8.0* 7.8*  --   --  6.9*   < > 7.4*  --   --    MCV 90 93  --   --  97   < > 94  --   --     327  --   --  337   < > 337  --   --    INR  --   --   --   --   --   --  1.88*  --  1.86*   *  --   --  129*  --   --  132*  --   --    POTASSIUM 4.1  --   --  4.4  --   --  3.9  --   --    CHLORIDE 96  --   --  96  --   --  100  --   --    CO2 28  --   --  26  --   --  25  --   --    BUN 21  --   --  30  --   --  20  --   --    CR 1.72*  --   --  2.14*  --   --  1.62*  --   --    ANIONGAP 7  --   --  7  --   --  7  --   --    JANENE 8.7  --   --  8.2*  --   --  7.9*  --   --    GLC 85  --  107* 125*  --   --  128*   < >  --    ALBUMIN 1.6*  --   --  1.4*  --   --  1.4*  --   --    PROTTOTAL 7.4  --   --  6.9  --   --  6.9  --   --    BILITOTAL 1.8*  --   --  1.6*  --   --  1.5*  --   --    ALKPHOS 101  --   --  101  --   --  111  --   --    ALT 20  --   --  19  --   --  20  --   --    AST 46*  --   --  43  --   --  40  --   --     < > = values in this interval not displayed.

## 2021-08-22 NOTE — PROVIDER NOTIFICATION
Provider notified (name): Dr. Simmons.  Reason for notification: LOW- Pt requesting a muscle relaxer. Offered all other PRNs but pt very persistent in request.  Recommendation/request given to provider: Is this possible to order? Thank you (:  Response from provider: Flexeril order placed.

## 2021-08-22 NOTE — PLAN OF CARE
"Assumed Cares 5429-7906    /50 (BP Location: Left arm)   Pulse 120   Temp 98.6  F (37  C) (Oral)   Resp 24   Ht 1.676 m (5' 6\")   Wt 78.5 kg (173 lb 1 oz)   SpO2 95%   BMI 27.93 kg/m      Ad hx: Alcohol withdrawal and multiple organ failures; necrotizing pancreatitis.  Med hx: Alcohol abuse.  Pain: C/o abdominal and back pain managed w/scheduled and PRN medications.  Vitals: Tachycardia and tachypnea noted. Some hypotension noted. Afebrile.  Neuro: A&Ox4.  Respiratory: On 4 LPM NC; satting 94-95% overnight. Tachypnea present; lung sounds diminished. Reports RODNEY. Denies SOB, but pt has visible accessory muscle use.  Cardiac/Neurovascular: Tachycardia present overnight. Some hypotension noted. Denies numbness/tingling. Trace dependent edema present.  GI/: On HD. Loose, watery BMs; incontinent, but occasionally calls for bedpan. Distended/firm abdomen. C/o intermittent nausea managed w/PRN q6h Zofran x1. GJ tube WNL; tube feedings through J tube @ 50 mL/hr w/q4h 60 mL flushes. G tube to gravity drainage.  Nutrition: Regular diet; poor appetite. Continuous tube feedings @ 50 mL/hr w/q4h 60 mL flushes.  Activity: Lift assist.  Skin: Jaundiced. Blanchable redness on buttocks. BL heels peeling. Excoriation on sacrum.  Lines: Left triple lumen PICC; red infusing NS @ TKO w/intermittent abx; white saline locked; grey occluded. GJ tube. Left peritoneal drain WNL.  Events this shift: Pt triggered sepsis yesterday evening; lactic resulted 1.7. Tachycardic and tachypnic throughout shift. Pain managed w/multiple medications. Resting quietly between cares.    Plan: Continue to monitor and notify MD of any changes in pt status.    Valentine Mcdonald RN on 8/22/2021 7:00 AM  "

## 2021-08-23 ENCOUNTER — APPOINTMENT (OUTPATIENT)
Dept: CT IMAGING | Facility: CLINIC | Age: 31
End: 2021-08-23
Attending: INTERNAL MEDICINE
Payer: MEDICAID

## 2021-08-23 LAB
ALBUMIN SERPL-MCNC: 1.5 G/DL (ref 3.4–5)
ALP SERPL-CCNC: 97 U/L (ref 40–150)
ALT SERPL W P-5'-P-CCNC: 19 U/L (ref 0–70)
ANION GAP SERPL CALCULATED.3IONS-SCNC: 10 MMOL/L (ref 3–14)
AST SERPL W P-5'-P-CCNC: 46 U/L (ref 0–45)
BILIRUB SERPL-MCNC: 1.6 MG/DL (ref 0.2–1.3)
BUN SERPL-MCNC: 36 MG/DL (ref 7–30)
CALCIUM SERPL-MCNC: 8.4 MG/DL (ref 8.5–10.1)
CHLORIDE BLD-SCNC: 90 MMOL/L (ref 94–109)
CO2 SERPL-SCNC: 32 MMOL/L (ref 20–32)
CREAT SERPL-MCNC: 2.49 MG/DL (ref 0.66–1.25)
ERYTHROCYTE [DISTWIDTH] IN BLOOD BY AUTOMATED COUNT: 17.6 % (ref 10–15)
GFR SERPL CREATININE-BSD FRML MDRD: 33 ML/MIN/1.73M2
GLUCOSE BLD-MCNC: 102 MG/DL (ref 70–99)
GLUCOSE BLDC GLUCOMTR-MCNC: 107 MG/DL (ref 70–99)
HCT VFR BLD AUTO: 25.5 % (ref 40–53)
HGB BLD-MCNC: 8 G/DL (ref 13.3–17.7)
MAGNESIUM SERPL-MCNC: 2.1 MG/DL (ref 1.6–2.3)
MCH RBC QN AUTO: 29.4 PG (ref 26.5–33)
MCHC RBC AUTO-ENTMCNC: 31.4 G/DL (ref 31.5–36.5)
MCV RBC AUTO: 94 FL (ref 78–100)
PHOSPHATE SERPL-MCNC: 7.1 MG/DL (ref 2.5–4.5)
PLATELET # BLD AUTO: 357 10E3/UL (ref 150–450)
POTASSIUM BLD-SCNC: 3.9 MMOL/L (ref 3.4–5.3)
PROT SERPL-MCNC: 7.4 G/DL (ref 6.8–8.8)
RBC # BLD AUTO: 2.72 10E6/UL (ref 4.4–5.9)
SODIUM SERPL-SCNC: 132 MMOL/L (ref 133–144)
WBC # BLD AUTO: 14.1 10E3/UL (ref 4–11)

## 2021-08-23 PROCEDURE — 82040 ASSAY OF SERUM ALBUMIN: CPT | Performed by: INTERNAL MEDICINE

## 2021-08-23 PROCEDURE — 250N000013 HC RX MED GY IP 250 OP 250 PS 637: Performed by: STUDENT IN AN ORGANIZED HEALTH CARE EDUCATION/TRAINING PROGRAM

## 2021-08-23 PROCEDURE — 250N000011 HC RX IP 250 OP 636: Performed by: INTERNAL MEDICINE

## 2021-08-23 PROCEDURE — 36592 COLLECT BLOOD FROM PICC: CPT | Performed by: INTERNAL MEDICINE

## 2021-08-23 PROCEDURE — 120N000002 HC R&B MED SURG/OB UMMC

## 2021-08-23 PROCEDURE — 99233 SBSQ HOSP IP/OBS HIGH 50: CPT | Performed by: STUDENT IN AN ORGANIZED HEALTH CARE EDUCATION/TRAINING PROGRAM

## 2021-08-23 PROCEDURE — 74176 CT ABD & PELVIS W/O CONTRAST: CPT | Mod: 26 | Performed by: STUDENT IN AN ORGANIZED HEALTH CARE EDUCATION/TRAINING PROGRAM

## 2021-08-23 PROCEDURE — 83735 ASSAY OF MAGNESIUM: CPT | Performed by: INTERNAL MEDICINE

## 2021-08-23 PROCEDURE — 99233 SBSQ HOSP IP/OBS HIGH 50: CPT | Mod: GC | Performed by: INTERNAL MEDICINE

## 2021-08-23 PROCEDURE — 250N000011 HC RX IP 250 OP 636: Performed by: STUDENT IN AN ORGANIZED HEALTH CARE EDUCATION/TRAINING PROGRAM

## 2021-08-23 PROCEDURE — 250N000013 HC RX MED GY IP 250 OP 250 PS 637: Performed by: INTERNAL MEDICINE

## 2021-08-23 PROCEDURE — 74176 CT ABD & PELVIS W/O CONTRAST: CPT

## 2021-08-23 PROCEDURE — 84100 ASSAY OF PHOSPHORUS: CPT | Performed by: INTERNAL MEDICINE

## 2021-08-23 PROCEDURE — 80053 COMPREHEN METABOLIC PANEL: CPT | Performed by: INTERNAL MEDICINE

## 2021-08-23 PROCEDURE — 85027 COMPLETE CBC AUTOMATED: CPT | Performed by: INTERNAL MEDICINE

## 2021-08-23 PROCEDURE — 99207 PR CDG-CUT & PASTE-POTENTIAL IMPACT ON LEVEL: CPT | Performed by: INTERNAL MEDICINE

## 2021-08-23 RX ORDER — IOPAMIDOL 755 MG/ML
101 INJECTION, SOLUTION INTRAVASCULAR ONCE
Status: COMPLETED | OUTPATIENT
Start: 2021-08-23 | End: 2021-08-23

## 2021-08-23 RX ORDER — HYDROXYZINE HYDROCHLORIDE 25 MG/1
25-50 TABLET, FILM COATED ORAL 3 TIMES DAILY PRN
Status: DISCONTINUED | OUTPATIENT
Start: 2021-08-23 | End: 2021-09-20

## 2021-08-23 RX ADMIN — PANCRELIPASE 2 CAPSULE: 24000; 76000; 120000 CAPSULE, DELAYED RELEASE PELLETS ORAL at 21:50

## 2021-08-23 RX ADMIN — HYDROMORPHONE HYDROCHLORIDE 1 MG: 1 SOLUTION ORAL at 02:19

## 2021-08-23 RX ADMIN — OXYCODONE HYDROCHLORIDE 5 MG: 5 SOLUTION ORAL at 04:36

## 2021-08-23 RX ADMIN — PANCRELIPASE 2 CAPSULE: 24000; 76000; 120000 CAPSULE, DELAYED RELEASE PELLETS ORAL at 11:03

## 2021-08-23 RX ADMIN — HYDROMORPHONE HYDROCHLORIDE 1 MG: 1 SOLUTION ORAL at 22:15

## 2021-08-23 RX ADMIN — HYDROXYZINE HYDROCHLORIDE 50 MG: 25 TABLET, FILM COATED ORAL at 22:16

## 2021-08-23 RX ADMIN — SODIUM BICARBONATE 325 MG: 325 TABLET ORAL at 02:20

## 2021-08-23 RX ADMIN — METRONIDAZOLE 500 MG: 500 INJECTION, SOLUTION INTRAVENOUS at 04:36

## 2021-08-23 RX ADMIN — IOPAMIDOL 101 ML: 755 INJECTION, SOLUTION INTRAVENOUS at 10:07

## 2021-08-23 RX ADMIN — OXYCODONE HYDROCHLORIDE 5 MG: 5 SOLUTION ORAL at 20:17

## 2021-08-23 RX ADMIN — METRONIDAZOLE 500 MG: 500 INJECTION, SOLUTION INTRAVENOUS at 20:12

## 2021-08-23 RX ADMIN — PANCRELIPASE 2 CAPSULE: 24000; 76000; 120000 CAPSULE, DELAYED RELEASE PELLETS ORAL at 17:11

## 2021-08-23 RX ADMIN — HYDROMORPHONE HYDROCHLORIDE 1 MG: 1 SOLUTION ORAL at 08:37

## 2021-08-23 RX ADMIN — Medication 1 PACKET: at 21:49

## 2021-08-23 RX ADMIN — Medication 5 ML: at 11:02

## 2021-08-23 RX ADMIN — SODIUM BICARBONATE 325 MG: 325 TABLET ORAL at 17:13

## 2021-08-23 RX ADMIN — METRONIDAZOLE 500 MG: 500 INJECTION, SOLUTION INTRAVENOUS at 12:25

## 2021-08-23 RX ADMIN — CEFEPIME HYDROCHLORIDE 1 G: 1 INJECTION, POWDER, FOR SOLUTION INTRAMUSCULAR; INTRAVENOUS at 20:12

## 2021-08-23 RX ADMIN — FOLIC ACID 1 MG: 1 TABLET ORAL at 08:38

## 2021-08-23 RX ADMIN — SODIUM BICARBONATE 325 MG: 325 TABLET ORAL at 21:50

## 2021-08-23 RX ADMIN — SODIUM BICARBONATE 325 MG: 325 TABLET ORAL at 06:03

## 2021-08-23 RX ADMIN — PANCRELIPASE 2 CAPSULE: 24000; 76000; 120000 CAPSULE, DELAYED RELEASE PELLETS ORAL at 02:20

## 2021-08-23 RX ADMIN — ONDANSETRON 4 MG: 2 INJECTION INTRAMUSCULAR; INTRAVENOUS at 12:26

## 2021-08-23 RX ADMIN — Medication 1 PACKET: at 21:52

## 2021-08-23 RX ADMIN — PANCRELIPASE 2 CAPSULE: 24000; 76000; 120000 CAPSULE, DELAYED RELEASE PELLETS ORAL at 06:03

## 2021-08-23 RX ADMIN — Medication 1 PACKET: at 11:02

## 2021-08-23 RX ADMIN — MELATONIN TAB 3 MG 6 MG: 3 TAB at 20:17

## 2021-08-23 RX ADMIN — OXYCODONE HYDROCHLORIDE 5 MG: 5 SOLUTION ORAL at 12:25

## 2021-08-23 RX ADMIN — Medication 1 PACKET: at 17:11

## 2021-08-23 RX ADMIN — SODIUM BICARBONATE 325 MG: 325 TABLET ORAL at 11:03

## 2021-08-23 RX ADMIN — HYDROXYZINE HYDROCHLORIDE 50 MG: 25 TABLET, FILM COATED ORAL at 08:37

## 2021-08-23 RX ADMIN — THIAMINE HCL TAB 100 MG 100 MG: 100 TAB at 08:38

## 2021-08-23 ASSESSMENT — ACTIVITIES OF DAILY LIVING (ADL)
ADLS_ACUITY_SCORE: 18

## 2021-08-23 ASSESSMENT — MIFFLIN-ST. JEOR: SCORE: 1639.75

## 2021-08-23 NOTE — PROGRESS NOTES
Nephrology Progress Note  08/23/2021         Dax Villareal is a 30 year old male with h/o ETOH hepatitis, end stage liver disease, RADHA on HD, transferred from [facility] in [place] for septic shock on 6/28/21 for treatment of necrotizing pancreatitis and decompensated liver disease. He was initially admitted to [facility] 5/19/21 and started on RRT 5/26/2021.        Interval History :   Mr Villareal had necrosectomy for 4th time on 8/11 with no more anticipated.  Running HD on TTS schedule, planning imaging now that pancreas drain has been capped over the weekend.  Will run tomorrow.        Assessment & Recommendations:   RADHA nearing ESRD-Cr was 0.8 as recently as 5/19/2021 but now HD dependent for 3 months, started RRT at [facility] prior to transfer to [facility] on 5/26.  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  continuing restorative cares for now, BP's have been stable enough to support HD.  Had GI stenting of pancreas x4.                 -Line is TDC from PTA               - HD on TTS schedule unless issues arise.      Volume status-Minimal UOP, large NG output the past 2 days, pulled 2L with last run.                Electrolytes/pH-K 3.9, bicarb 32, no acute issues.       Ca/phos/pth-Ca 8.4, corrects to normal with low albumin.  Mg and Phos reasonable.       Anemia-Hgb low at 8, acute management per team, last PRBC's 7/14.  Started on EPO 5k with runs, iron sats 43%      Nutrition-Taking PO, appetite reasonable.       Seen and discussed with Dr Ryan      Recommendations were communicated to primary team via note       SOFIA Marlow CNS  Clinical Nurse Specialist  [phone]    Review of Systems:   I reviewed the following systems:  Gen: No fevers or chills  CV: No CP at rest  Resp: No SOB at rest  GI: No N/V    Physical Exam:   I/O last 3 completed shifts:  In: 1380 [I.V.:220; NG/GT:560]  Out: 3125 [Urine:50; Emesis/NG  "output:7340]   /45 (BP Location: Left arm)   Pulse 117   Temp 98  F (36.7  C) (Oral)   Resp 20   Ht 1.676 m (5' 6\")   Wt 75 kg (165 lb 5.5 oz)   SpO2 94%   BMI 26.69 kg/m        GENERAL APPEARANCE: Lying in bed, in no distress.    EYES:  scleral icterus, pupils equal  Pulmonary: decreased BS   CV: tachy   - Edema - no LE edema  GI: mild distention, non-tender. Has pancreatic drain  MS: no evidence of inflammation in joints, no muscle tenderness  SKIN: no rash, warm, dry, no cyanosis  NEURO: alert/interactive  ACCESS: Right TDC    Labs:   All labs reviewed by me  Electrolytes/Renal - Recent Labs   Lab Test 08/23/21  1146 08/22/21  0540 08/21/21  1136 08/21/21  0555   * 131*  --  129*   POTASSIUM 3.9 4.1  --  4.4   CHLORIDE 90* 96  --  96   CO2 32 28  --  26   BUN 36* 21  --  30   CR 2.49* 1.72*  --  2.14*   * 85 107* 125*   JANENE 8.4* 8.7  --  8.2*   MAG 2.1 1.7  --  1.6   PHOS 7.1* 3.7  --  4.2       CBC -   Recent Labs   Lab Test 08/23/21  1146 08/22/21  0540 08/21/21  1352   WBC 14.1* 14.5* 13.2*   HGB 8.0* 8.0* 7.8*    327 327       LFTs -   Recent Labs   Lab Test 08/23/21  1146 08/22/21  0540 08/21/21  0555   ALKPHOS 97 101 101   BILITOTAL 1.6* 1.8* 1.6*   ALT 19 20 19   AST 46* 46* 43   PROTTOTAL 7.4 7.4 6.9   ALBUMIN 1.5* 1.6* 1.4*       Iron Panel -   Recent Labs   Lab Test 08/14/21  2055 07/19/21  0632   IRON 28* 31*   IRONSAT 21 43   JERRELL 2,186*  --            Current Medications:    sodium bicarbonate  325 mg Per Feeding Tube Q4H    And     amylase-lipase-protease  1-2 capsule Per Feeding Tube Q4H     amylase-lipase-protease  2 capsule Oral TID w/meals     B and C vitamin Complex with folic acid  5 mL Per Feeding Tube Daily     banatrol plus  1 packet Per Feeding Tube TID     ceFEPIme (MAXIPIME) IV  1 g Intravenous Q24H     folic acid  1 mg Oral Daily     melatonin  6 mg Oral At Bedtime     menthol   Transdermal Q8H     metroNIDAZOLE  500 mg Intravenous Q8H     oxyCODONE  5 mg " Oral Q8H     pantoprazole (PROTONIX) IV  40 mg Intravenous Daily with breakfast     protein modular  1 packet Per Feeding Tube BID     sodium chloride (PF)  3 mL Intracatheter Q8H     sodium chloride (PF)  73 mL Intravenous Once     sodium chloride (PF)  75 mL Intravenous Once     sodium chloride (PF)  86 mL Intravenous Once     vitamin B1  100 mg Oral Daily    Or     thiamine  100 mg Intravenous Daily       - MEDICATION INSTRUCTIONS -       dextrose

## 2021-08-23 NOTE — PLAN OF CARE
"Assumed Cares 9926-6175    /45 (BP Location: Left arm)   Pulse 117   Temp 98  F (36.7  C) (Oral)   Resp 20   Ht 1.676 m (5' 6\")   Wt 75 kg (165 lb 5.5 oz)   SpO2 94%   BMI 26.69 kg/m      Ad hx: Alcohol withdrawal and multiple organ failures; necrotizing pancreatitis.  Med hx: Alcohol abuse.  Pain: C/o abdominal and back pain managed w/scheduled and PRN medications.  Vitals: Tachycardia and tachypnea noted. Some hypotension noted. Afebrile.  Neuro: A&Ox4.  Respiratory: On 4 LPM NC; satting 94-95% overnight. Tachypnea present; lung sounds diminished. Reports RODNEY. Denies SOB, but pt has visible accessory muscle use.  Cardiac/Neurovascular: Tachycardia present overnight. Some hypotension noted. Denies numbness/tingling. Trace dependent edema present.  GI/: On HD. Loose, watery BMs; occasionally incontinent. Distended/firm abdomen. GJ tube WNL; tube feedings through J tube @ 50 mL/hr w/q4h 60 mL flushes. G tube to gravity drainage; green output. Denies NV overnight.  Nutrition: Regular diet; poor appetite. Continuous tube feedings @ 50 mL/hr w/q4h 60 mL flushes.  Activity: Lift assist.  Skin: Jaundiced. Blanchable redness on buttocks. BL heels peeling. Excoriation on sacrum.  Lines: Left triple lumen PICC; red infusing NS @ TKO w/intermittent abx; white saline locked; grey occluded. GJ tube. Left peritoneal drain WNL. GJ; G to gravity drainage; J infusing tube feeds.  Events this shift: Pt triggered sepsis yesterday evening; lactic resulted 1.4. Tachycardic and tachypnic throughout shift. Pain managed w/multiple medications. Resting quietly between cares.    Plan: Continue to monitor and notify MD of any changes in pt status.    Valentine Mcdonald RN on 8/23/2021 7:00 AM  "

## 2021-08-23 NOTE — PROGRESS NOTES
[facility]  Progress Note - [place] 3 Service        Date of Admission:  6/28/2021    Assessment & Plan   Dax Villareal is a 30 year old male with hx of alcohol use disorder admitted on 6/28/2021 after recent prolonged admission at [facility] in [place] who has severe acute alcoholic hepatitis c/b HE, ESRD requiring HD, and malnutrition in the setting of acute necrotizing pancreatitis, s/p PEG-J placement on 7/13. He arrived with acute respiratory failure and septic shock that have resolved, and his secondary bacterial peritonitis has been appropriately treated. He requires continued management for ESRD, hepatic encephalopathy, infected pancreatic pseudocyst, nutritional support via PEG-J, and persistent right pleural effusion. Clinically improving after multiple necrosectomies.     Today:  - CT today with possible removal of biliary drain (currently clamped)  - Increase frequency of hydroxyzine     Septic Shock  Necrotizing alcoholic pancreatitis w/ infected pseudocyst  Secondary Bacterial Peritonitis 2/2 infected pseudocyst with VRE  Admitted to the [facility] ICU on 6/28/2021 from [facility] in [place] for septic shock due to necrotizing pancreatitis. CT on admit showing mature collection with enhancing wall measuring ~[id number]. IR placed perc drain 6/29. Repeat imaging 7/18 revealed walled off necrotic collection amenable to endoscopic transluminal drainage with cystgastrostomy stenting with necrosectomy on 7/21/21.              7/2 - Diagnostic paracentesis (WBC 2663 - 74% PMN, SAAG <1.1, protein 4.1) and amylase, peritoneal fluid cultures negative               7/28 - EGD, stent replacement (transluminal Axios with coaxial Solus), PEG-J removal (buried bumper) and replacement of new PEG-J with one new T-tag              8/6 - Repeat necrosectomy and Axios removal/replacement              8/11 - EGD with completion necrosectomy     Abx:  Cefepime and metronidazole    (Linezolid 7/21-7/30, Meropenem 6/29-7/18, Ceftriaxone 7/21-7/24; 7/26-7/27 Zosyn 7/27-8/6)  BCx: NGTD since 5/18/2021     Gastic Outlet Obstruction  High G tube output due to the cystogastric connection/fluid drainage noted on 8/5. Per GI, possibly related to gastric outlet from severe abdominal inflammation and is expected. KUB negative for obstructive gas pattern. Ileus from narcotics considered, but output not consistent with tube feeds. Nausea and vomiting improved after cystogastrectomy.     Right Pancreatic Hydrothorax  Increased dyspnea with CXR on 7/19 with further imaging and thoracentesis confirming a hepatic/pancreatic hydrothorax. Thoracentesis PRN for dyspnea.     Alcoholic Hepatitis  Coagulation Defect  Received 1 week of steroids at [facility] for elevated INR and altered mental status. Not a liver transplant candidate. Mental status improved with aggressive lactulose and rifaxamin, which was discontinued without return of AMS.     Hepatic Abscess  CT Ab/pelvis from 8/13 and 8/21 showing a 3.0 x 2.7 cm hepatic abscess. IR consulted and recommended continue ABx without intervention, see above.      ESRD due to ATN  Stage III RADHA due to ATN from septic shock without recovery for >3 months. Line is TDC from [facility] dialyzed on Tu, Th, Sa schedule.  - Will need follow-up and outpatient dialysis     Anemia due to Chronic Illness  Hematochezia  Hemoperitoneum  Baseline hgb 17. Running between 6-8 in setting of chronic illness, new ESRD with epocrit per Nephrology, frequent lab draws and procedures. Hematochezia on 8/8 after starting standard intensity heparin. Paracentesis on 8/21 with hemoperitoneum.  - Goal hgb >7.0, trend Hgb     Alcohol Use Disorder  Counseled patient on complete alcohol cessation. Not interested in additional services at this time. Continue daily folic acid and thiamine.     Lactic Acidosis  Intermittent Lactic acidosis in the setting of sepsis, ESRD,  bleeding.     Hypokalemia  Noted in the setting of high gastric output and improved once G-tube output decreased. CTM.      Severe Malnutrition  Poor oral intake in the setting of necrotizing pancreatitis. PEG tube placed 7/13. Dietitian consulted and continuous tube feeds with goal of 65 ml/hr with continuous pancreatic enzymes  - Continuous tube feeds decreased to 50 mL overnight.       Diet: Regular Diet Adult  Adult Formula Drip Feeding: Continuous Vital 1.5; Jejunostomy; Goal Rate: 50; mL/hr; Medication - Feeding Tube Flush Frequency: At least 15-30 mL water before and after medication administration and with tube clogging; Amount to Send (Nutrition us...    DVT Prophylaxis: Pneumatic Compression Devices  Rdz Catheter: Not present  Fluids: 10 mL NS TKO  Central Lines: PRESENT  PICC Triple Lumen 06/28/21 Left-Site Assessment: WDL except  CVC Double Lumen Right-Site Assessment: WDL  Code Status: Full Code      Disposition Plan   Expected discharge: >7 days, recommended to transitional care unit or LTACH once  nutrition improved, infected pancreatic pseudocyst is well managed and plan for dialysis is in place.       Qamar Jackson MD  Med/Peds, PGY-2  [phone]  [address]  Securely message with the Vocera Web Console (learn more here)  Text page via [employer] Paging/Directory  Please see sign in/sign out for up to date coverage information  _____________________________________________________________________    Interval History    Patient with significant gastric output - 2/2L in the past 24hrs. Continues to struggle with nausea. Will get CT today after which we will touch base with IR for drain removal. Stool x5.     Physical Exam   Vital Signs: Temp: 98  F (36.7  C) Temp src: Oral BP: 103/45 Pulse: 117   Resp: 20 SpO2: 94 % O2 Device: Nasal cannula Oxygen Delivery: 4 LPM  Weight: 165 lbs 5.52 oz  Constitutional: Awake, non-diaphoretic, conversational    Cardiovascular: tachycardic, tender chest to palpation diffusely, no murmur noted  Respiratory: Tachypnic, Decreased breath sounds on right, NC on for comfort  Abdomen: Tolerates palpation with minimal pain, PEG-tube site clean, RUQ tenderness  Musculoskeletal: Emaciated, moving all extremities on examination  Skin: no Jaundice    Data   Recent Labs   Lab 08/22/21  0540 08/21/21  1352 08/21/21  1136 08/21/21  0555 08/21/21  0524 08/20/21  1726 08/20/21  0610   WBC 14.5* 13.2*  --   --  12.3*   < > 13.7*   HGB 8.0* 7.8*  --   --  6.9*   < > 7.4*   MCV 90 93  --   --  97   < > 94    327  --   --  337   < > 337   INR  --   --   --   --   --   --  1.88*   *  --   --  129*  --   --  132*   POTASSIUM 4.1  --   --  4.4  --   --  3.9   CHLORIDE 96  --   --  96  --   --  100   CO2 28  --   --  26  --   --  25   BUN 21  --   --  30  --   --  20   CR 1.72*  --   --  2.14*  --   --  1.62*   ANIONGAP 7  --   --  7  --   --  7   JANENE 8.7  --   --  8.2*  --   --  7.9*   GLC 85  --  107* 125*  --   --  128*   ALBUMIN 1.6*  --   --  1.4*  --   --  1.4*   PROTTOTAL 7.4  --   --  6.9  --   --  6.9   BILITOTAL 1.8*  --   --  1.6*  --   --  1.5*   ALKPHOS 101  --   --  101  --   --  111   ALT 20  --   --  19  --   --  20   AST 46*  --   --  43  --   --  40    < > = values in this interval not displayed.

## 2021-08-24 LAB
ALBUMIN SERPL-MCNC: 1.5 G/DL (ref 3.4–5)
ALP SERPL-CCNC: 96 U/L (ref 40–150)
ALT SERPL W P-5'-P-CCNC: 17 U/L (ref 0–70)
ANION GAP SERPL CALCULATED.3IONS-SCNC: 9 MMOL/L (ref 3–14)
AST SERPL W P-5'-P-CCNC: 39 U/L (ref 0–45)
BILIRUB SERPL-MCNC: 1.5 MG/DL (ref 0.2–1.3)
BUN SERPL-MCNC: 47 MG/DL (ref 7–30)
CALCIUM SERPL-MCNC: 8.3 MG/DL (ref 8.5–10.1)
CHLORIDE BLD-SCNC: 90 MMOL/L (ref 94–109)
CO2 SERPL-SCNC: 33 MMOL/L (ref 20–32)
CREAT SERPL-MCNC: 2.95 MG/DL (ref 0.66–1.25)
ERYTHROCYTE [DISTWIDTH] IN BLOOD BY AUTOMATED COUNT: 17.3 % (ref 10–15)
GFR SERPL CREATININE-BSD FRML MDRD: 27 ML/MIN/1.73M2
GLUCOSE BLD-MCNC: 132 MG/DL (ref 70–99)
HCT VFR BLD AUTO: 23.2 % (ref 40–53)
HGB BLD-MCNC: 7.5 G/DL (ref 13.3–17.7)
LACTATE SERPL-SCNC: 2 MMOL/L (ref 0.7–2)
LACTATE SERPL-SCNC: 2.1 MMOL/L (ref 0.7–2)
MAGNESIUM SERPL-MCNC: 2.2 MG/DL (ref 1.6–2.3)
MCH RBC QN AUTO: 29.6 PG (ref 26.5–33)
MCHC RBC AUTO-ENTMCNC: 32.3 G/DL (ref 31.5–36.5)
MCV RBC AUTO: 92 FL (ref 78–100)
PHOSPHATE SERPL-MCNC: 8.6 MG/DL (ref 2.5–4.5)
PLATELET # BLD AUTO: 320 10E3/UL (ref 150–450)
POTASSIUM BLD-SCNC: 3.8 MMOL/L (ref 3.4–5.3)
PROT SERPL-MCNC: 7.2 G/DL (ref 6.8–8.8)
RBC # BLD AUTO: 2.53 10E6/UL (ref 4.4–5.9)
SODIUM SERPL-SCNC: 132 MMOL/L (ref 133–144)
WBC # BLD AUTO: 12.7 10E3/UL (ref 4–11)

## 2021-08-24 PROCEDURE — 250N000013 HC RX MED GY IP 250 OP 250 PS 637: Performed by: STUDENT IN AN ORGANIZED HEALTH CARE EDUCATION/TRAINING PROGRAM

## 2021-08-24 PROCEDURE — 999N000033 HC STATISTIC CHRONIC PULMONARY DISEASE SPECIALIST

## 2021-08-24 PROCEDURE — 250N000013 HC RX MED GY IP 250 OP 250 PS 637: Performed by: INTERNAL MEDICINE

## 2021-08-24 PROCEDURE — 90937 HEMODIALYSIS REPEATED EVAL: CPT

## 2021-08-24 PROCEDURE — 250N000011 HC RX IP 250 OP 636: Performed by: STUDENT IN AN ORGANIZED HEALTH CARE EDUCATION/TRAINING PROGRAM

## 2021-08-24 PROCEDURE — 99406 BEHAV CHNG SMOKING 3-10 MIN: CPT

## 2021-08-24 PROCEDURE — 250N000011 HC RX IP 250 OP 636: Performed by: INTERNAL MEDICINE

## 2021-08-24 PROCEDURE — 120N000002 HC R&B MED SURG/OB UMMC

## 2021-08-24 PROCEDURE — 83605 ASSAY OF LACTIC ACID: CPT | Performed by: INTERNAL MEDICINE

## 2021-08-24 PROCEDURE — 36592 COLLECT BLOOD FROM PICC: CPT | Performed by: INTERNAL MEDICINE

## 2021-08-24 PROCEDURE — 83605 ASSAY OF LACTIC ACID: CPT | Performed by: NURSE PRACTITIONER

## 2021-08-24 PROCEDURE — 83735 ASSAY OF MAGNESIUM: CPT | Performed by: INTERNAL MEDICINE

## 2021-08-24 PROCEDURE — 634N000001 HC RX 634: Performed by: CLINICAL NURSE SPECIALIST

## 2021-08-24 PROCEDURE — 80053 COMPREHEN METABOLIC PANEL: CPT | Performed by: INTERNAL MEDICINE

## 2021-08-24 PROCEDURE — 99233 SBSQ HOSP IP/OBS HIGH 50: CPT | Performed by: STUDENT IN AN ORGANIZED HEALTH CARE EDUCATION/TRAINING PROGRAM

## 2021-08-24 PROCEDURE — 85014 HEMATOCRIT: CPT | Performed by: INTERNAL MEDICINE

## 2021-08-24 PROCEDURE — 84100 ASSAY OF PHOSPHORUS: CPT | Performed by: INTERNAL MEDICINE

## 2021-08-24 PROCEDURE — C9113 INJ PANTOPRAZOLE SODIUM, VIA: HCPCS | Performed by: INTERNAL MEDICINE

## 2021-08-24 PROCEDURE — 258N000003 HC RX IP 258 OP 636: Performed by: CLINICAL NURSE SPECIALIST

## 2021-08-24 PROCEDURE — 250N000011 HC RX IP 250 OP 636: Performed by: CLINICAL NURSE SPECIALIST

## 2021-08-24 PROCEDURE — 99233 SBSQ HOSP IP/OBS HIGH 50: CPT | Mod: GC | Performed by: INTERNAL MEDICINE

## 2021-08-24 RX ORDER — HEPARIN SODIUM,PORCINE 10 UNIT/ML
5-20 VIAL (ML) INTRAVENOUS EVERY 24 HOURS
Status: DISCONTINUED | OUTPATIENT
Start: 2021-08-24 | End: 2021-10-12 | Stop reason: HOSPADM

## 2021-08-24 RX ORDER — HEPARIN SODIUM 1000 [USP'U]/ML
1000 INJECTION, SOLUTION INTRAVENOUS; SUBCUTANEOUS CONTINUOUS
Status: DISCONTINUED | OUTPATIENT
Start: 2021-08-24 | End: 2021-10-12 | Stop reason: HOSPADM

## 2021-08-24 RX ORDER — POLYETHYLENE GLYCOL 3350 17 G
4 POWDER IN PACKET (EA) ORAL
Status: DISCONTINUED | OUTPATIENT
Start: 2021-08-24 | End: 2021-10-12 | Stop reason: HOSPADM

## 2021-08-24 RX ORDER — HEPARIN SODIUM,PORCINE 10 UNIT/ML
5-20 VIAL (ML) INTRAVENOUS
Status: DISCONTINUED | OUTPATIENT
Start: 2021-08-24 | End: 2021-10-12 | Stop reason: HOSPADM

## 2021-08-24 RX ADMIN — OXYCODONE HYDROCHLORIDE 5 MG: 5 SOLUTION ORAL at 14:06

## 2021-08-24 RX ADMIN — Medication 1 PACKET: at 21:05

## 2021-08-24 RX ADMIN — Medication 5 ML: at 07:47

## 2021-08-24 RX ADMIN — Medication 1 PACKET: at 07:47

## 2021-08-24 RX ADMIN — OXYCODONE HYDROCHLORIDE 5 MG: 5 SOLUTION ORAL at 20:55

## 2021-08-24 RX ADMIN — METRONIDAZOLE 500 MG: 500 INJECTION, SOLUTION INTRAVENOUS at 20:58

## 2021-08-24 RX ADMIN — PANCRELIPASE 2 CAPSULE: 24000; 76000; 120000 CAPSULE, DELAYED RELEASE PELLETS ORAL at 20:55

## 2021-08-24 RX ADMIN — Medication 1 PACKET: at 14:06

## 2021-08-24 RX ADMIN — Medication 5 ML: at 16:45

## 2021-08-24 RX ADMIN — HYDROMORPHONE HYDROCHLORIDE 1 MG: 1 SOLUTION ORAL at 12:34

## 2021-08-24 RX ADMIN — HEPARIN SODIUM 1000 UNITS/HR: 1000 INJECTION INTRAVENOUS; SUBCUTANEOUS at 09:07

## 2021-08-24 RX ADMIN — SODIUM BICARBONATE 325 MG: 325 TABLET ORAL at 16:45

## 2021-08-24 RX ADMIN — SODIUM BICARBONATE 325 MG: 325 TABLET ORAL at 12:22

## 2021-08-24 RX ADMIN — OXYCODONE HYDROCHLORIDE 5 MG: 5 SOLUTION ORAL at 03:13

## 2021-08-24 RX ADMIN — PANCRELIPASE 2 CAPSULE: 24000; 76000; 120000 CAPSULE, DELAYED RELEASE PELLETS ORAL at 16:44

## 2021-08-24 RX ADMIN — METRONIDAZOLE 500 MG: 500 INJECTION, SOLUTION INTRAVENOUS at 05:01

## 2021-08-24 RX ADMIN — SODIUM CHLORIDE 250 ML: 9 INJECTION, SOLUTION INTRAVENOUS at 09:07

## 2021-08-24 RX ADMIN — Medication 1 PACKET: at 07:48

## 2021-08-24 RX ADMIN — METRONIDAZOLE 500 MG: 500 INJECTION, SOLUTION INTRAVENOUS at 13:13

## 2021-08-24 RX ADMIN — PANCRELIPASE 2 CAPSULE: 24000; 76000; 120000 CAPSULE, DELAYED RELEASE PELLETS ORAL at 02:52

## 2021-08-24 RX ADMIN — THIAMINE HCL TAB 100 MG 100 MG: 100 TAB at 07:46

## 2021-08-24 RX ADMIN — CEFEPIME HYDROCHLORIDE 1 G: 1 INJECTION, POWDER, FOR SOLUTION INTRAMUSCULAR; INTRAVENOUS at 20:56

## 2021-08-24 RX ADMIN — HYDROXYZINE HYDROCHLORIDE 50 MG: 25 TABLET, FILM COATED ORAL at 13:32

## 2021-08-24 RX ADMIN — HYDROMORPHONE HYDROCHLORIDE 1 MG: 1 SOLUTION ORAL at 07:57

## 2021-08-24 RX ADMIN — SODIUM BICARBONATE 325 MG: 325 TABLET ORAL at 20:55

## 2021-08-24 RX ADMIN — SODIUM CHLORIDE 300 ML: 9 INJECTION, SOLUTION INTRAVENOUS at 09:07

## 2021-08-24 RX ADMIN — PANCRELIPASE 2 CAPSULE: 24000; 76000; 120000 CAPSULE, DELAYED RELEASE PELLETS ORAL at 12:22

## 2021-08-24 RX ADMIN — PANCRELIPASE 2 CAPSULE: 24000; 76000; 120000 CAPSULE, DELAYED RELEASE PELLETS ORAL at 07:46

## 2021-08-24 RX ADMIN — FOLIC ACID 1 MG: 1 TABLET ORAL at 07:46

## 2021-08-24 RX ADMIN — SODIUM BICARBONATE 325 MG: 325 TABLET ORAL at 02:52

## 2021-08-24 RX ADMIN — SODIUM BICARBONATE 325 MG: 325 TABLET ORAL at 07:46

## 2021-08-24 RX ADMIN — HYDROMORPHONE HYDROCHLORIDE 1 MG: 1 SOLUTION ORAL at 16:45

## 2021-08-24 RX ADMIN — MELATONIN TAB 3 MG 6 MG: 3 TAB at 21:06

## 2021-08-24 RX ADMIN — PANTOPRAZOLE SODIUM 40 MG: 40 INJECTION, POWDER, FOR SOLUTION INTRAVENOUS at 13:13

## 2021-08-24 RX ADMIN — EPOETIN ALFA-EPBX 5000 UNITS: 10000 INJECTION, SOLUTION INTRAVENOUS; SUBCUTANEOUS at 09:36

## 2021-08-24 ASSESSMENT — ACTIVITIES OF DAILY LIVING (ADL)
ADLS_ACUITY_SCORE: 17
ADLS_ACUITY_SCORE: 19

## 2021-08-24 ASSESSMENT — MIFFLIN-ST. JEOR
SCORE: 1598.75
SCORE: 1582.75

## 2021-08-24 NOTE — PROGRESS NOTES
[facility]    GASTROENTEROLOGY PROGRESS NOTE    ASSESSMENT/RECOMMENDATIONS:  30 year old male with a history of heavy alcohol use who presented to OSH 5/19 for abdominal pain, nausea and vomiting, admitted for alcohol hepatitis, ESLD, RADHA on HD, septic shock requiring pressors as well as necrotizing pancreatitis with large evolving necrotic collection. Transferred to [facility] for consideration of drainage of presumed infected necrotic collection. S/p EUS transluminal and percutaneous drainage.    #. Acute necrotizing pancreatitis with presumed infected walled off necrotic collection s/p percutaneous and endoscopic drainage   #. Septic shock, resolved  #. Leukocytosis, improved  #. Ascites/Peritonitis  Unclear evolution of pancreatitis history but had a recent CT scan with large evolving necrotic collection, presumed infected. No obvious gas in collection. CT on admission to [facility] showing mature collection with enhancing wall measuring ~[id number]. With profound coagulopathy (liver disease + malnutrition +sepsis), INR 3.8 on transfer he was given FFP and Vit K, IR placed perc drain 6/29. Repeat imaging 7/18 with still large walled off necrotic collection that appeared amenable to endoscopic transluminal drainage, underwent cystgastrostomy stenting on 7/21/21, extensive debridement/necrosectomy 8/6, final near complete/suffiecient debridement with evacuation of solid necrosis in right side of cavity 8/11,two 10 x 1 cm Solus stents placed across cavity, no current plans for future necrosectomies.  Etiology: alcohol  Date of onset: 5/19  BISAP score on admission: unclear  Concurrent organ failure: respiratory (now extubated), renal (on HD), liver, cardiogenic (weaned off pressors 6/29)  Thromboses: none  Diabetes: no  Current nutrition access: PEG-J + po diet  Last imaging: CT scan with IV contrast 8/23  Infection/antiinfectives:                 - Meropenem (6/29 - 7/18),  Ciprofloxacin (7/19 - 7/21), Vancomycin (6/29 - 6/30), Micafungin (6/29 - 7/1), Ceftriaxone (7/21 - 7/23), Linezolid (7/21 - 7/30), Zosyn (7/27-8/6), Cefepime (8/6 - present), Metronidazole (8/6 - present)    Intervention:                 6/30 - Left IR RP perc drain (24F)                 7/2 - Diagnostic paracentesis (WBC 2663 - 74% PMN, SAAG <1.1, protein 4.1)                  7/5 - Repeat Paracentesis (WBC 1801 - 61% PMN, lipase 48)                 7/13 - Diag/therapeutic paracentesis ()                 7/13 - PEG-J placement with T-tag gastropexy                 7/21 - Repeat paracentesis                 7/21 - EUS-cystgastrostomy, Axios with co-axial Viabil                 7/28 - EGD, stent replacement (transluminal Axios with coaxial Solus), PEG-J removal (buried bumper) and replacement of new PEG-J with one new T-tag                 8/6 - EGD with necrosectomy and near complete necrosectomy                 8/11 - EGD with completion necrosectomy, no e/o bowel fistula     -- no further necrosectomies anticipated  -- IR plans to remove perc drain tomorrow under fluoroscopy  -- PRN paracentesis  -- continue antibiotics until drain removed - could likely stop them tomorrow  -- pain meds per primary team  -- from GI perspective, we are comfortable with discharge to TCU vs rehab center    #. Liver abscess vs infarct  Seen on CT (8/13/21), will complete course of antibiotics in the coming days.     #. Severe malnutrition in the context of chronic illness  #. Gastric outlet obstruction s/p PEG-J placement  #. Mild to moderate EPI  #. Concern for bowel fistula  Patient with very poor oral intake over the course of his illness and was not meeting calorie requirements orally. S/p PEG-J with T-tag gastropexy (x3) 7/13. Overall improved.  -- g tube to gravity - could potentially trial capping this but does have intermittent nausea and vomiting  -- diet as tolerated  -- continue tube feeds with PERT     #.  "Hematochezia, resolved  Two episodes of BRBPR 8/8-8/9. Likely related to oozing after necrosectomy in the setting of new anticoagulation for questionable PE (CT Chest 8/8 with concern for PE). Hgb stable. Bleeding stopped after anticoagulation was stopped. Would continue to hold anticoagulation for now. Hemodynamics unchanged. CTA 8/10 without PE  -- follow H/H    #. Alcohol hepatitis, resolving  #. Hyperbilirubinemia  #. Coagulopathy  #. Encephalopathy, improved  Known heavy alcohol use. Reportedly received course of steroids at OSH for 1 week. Unclear if he has underlying cirrhosis but certainly would fit with alcohol hepatitis vs elevated liver tests of critical illness/sepsis. Hepatology notified of admission and they have weighed in on patient NOT being a liver transplant candidate at this time.  -- trend MELD labs  -- alcohol abstinence     Panc/bili GI team will continue to follow with you.     GI Follow up: Clinic visit with Dr. Chacko in 4-6 weeks     The patient was seen and discussed and plan agreed upon with GI staff, Dr. Castro Norwood PA-C  Advanced Endoscopy/Pancreaticobiliary GI Service  [facility]  Text Page  _______________________________________________________________  S: 24hr events and RN notes reviewed. Patient not seen today, in dialysis    O:  Blood pressure 98/41, pulse 115, temperature (!) 96.5  F (35.8  C), temperature source Oral, resp. rate 18, height 1.676 m (5' 6\"), weight 68.5 kg (151 lb 0.2 oz), SpO2 95 %.        LABS:  BMP  Recent Labs   Lab 08/24/21  0536 08/23/21  1733 08/23/21  1146 08/22/21  0540 08/21/21  0555   *  --  132* 131* 129*   POTASSIUM 3.8  --  3.9 4.1 4.4   CHLORIDE 90*  --  90* 96 96   JANENE 8.3*  --  8.4* 8.7 8.2*   CO2 33*  --  32 28 26   BUN 47*  --  36* 21 30   CR 2.95*  --  2.49* 1.72* 2.14*   * 107* 102* 85 125*     CBC  Recent Labs   Lab 08/24/21  0536 08/23/21  1146 08/22/21  0540 08/21/21  1352 "   WBC 12.7* 14.1* 14.5* 13.2*   RBC 2.53* 2.72* 2.69* 2.65*   HGB 7.5* 8.0* 8.0* 7.8*   HCT 23.2* 25.5* 24.3* 24.5*   MCV 92 94 90 93   MCH 29.6 29.4 29.7 29.4   MCHC 32.3 31.4* 32.9 31.8   RDW 17.3* 17.6* 17.8* 16.9*    357 327 327     INR  Recent Labs   Lab 08/20/21  0610   INR 1.88*     LFTs  Recent Labs   Lab 08/24/21  0536 08/23/21  1146 08/22/21  0540 08/21/21  0555   ALKPHOS 96 97 101 101   AST 39 46* 46* 43   ALT 17 19 20 19   BILITOTAL 1.5* 1.6* 1.8* 1.6*   PROTTOTAL 7.2 7.4 7.4 6.9   ALBUMIN 1.5* 1.5* 1.6* 1.4*      PANCNo lab results found in last 7 days.    IMAGING:  EXAMINATION: CT ABDOMEN PELVIS W CONTRAST, 8/23/2021 10:15 AM                                                                   IMPRESSION:   1. Sequela of necrotizing pancreatitis with stable peripancreatic  fluid collections. Unchanged position of left lateral approach large  bore drain and cystogastrostomy tube.  2. Stable large right pleural effusion with associated atelectasis.  3. Decreased size of hypoattenuating lesion of hepatic segment 2.   Attention on follow-up.  4. Stable loculated ascites.  5. Edematous and poorly enhancing kidneys similar to prior.

## 2021-08-24 NOTE — PROGRESS NOTES
HEMODIALYSIS TREATMENT NOTE    Date: 8/24/2021  Time: 12:40 PM    Data:  Pre Wt: 68.5 kg (151 lb 0.2 oz)   Desired Wt: 66.5 kg   Post Wt: 66.5 kg (146 lb 9.7 oz)  Weight change: 2 kg  Ultrafiltration - Post Run Net Total Removed (mL): 2000 mL  Vascular Access Status: CVC  patent  Dialyzer Rinse: Clear  Total Blood Volume Processed: 76.76 L   Total Dialysis (Treatment) Time: 3.5 hrs   Dialysate Bath: K 3, Ca 3  Heparin 1000 units loading + 1000 units/hr    Lab:   No    Assessment:  Arrived by bed. Pulsate mattress. TF running. C/O pain, on scheduled meds. Reposition per self. VS WNL at start. BP on LLE. Good flow on CVC, dsg c/d/i. Epogen via machine.     Tolerated run well. CVC NS locked; new caps. Report given to primary RN.     Plan:    Return to floor. Next run per renal team.     Doug Perales, DERRICKN, RN

## 2021-08-24 NOTE — PROGRESS NOTES
[facility]  Progress Note - [place] 3 Service        Date of Admission:  6/28/2021    Assessment & Plan   Dax Villareal is a 30 year old male with hx of alcohol use disorder admitted on 6/28/2021 after recent prolonged admission at [facility] who has severe acute alcoholic hepatitis c/b HE, ESRD requiring HD, and malnutrition in the setting of acute necrotizing pancreatitis, s/p PEG-J placement on 7/13. He arrived with acute respiratory failure and septic shock that have resolved, and his secondary bacterial peritonitis has been appropriately treated. He requires continued management for ESRD, hepatic encephalopathy, infected pancreatic pseudocyst, nutritional support via PEG-J, and persistent right pleural effusion. Clinically improving after multiple necrosectomies.     Today:  - IR to remove left flank drain  - Discontinue antibiotics once drain is removed  - Dialysis today  - Likely discharging at the end of the week per GI; will reach out to SW about placement and dialysis home    Septic Shock  Necrotizing alcoholic pancreatitis w/ infected pseudocyst  Secondary Bacterial Peritonitis 2/2 infected pseudocyst with VRE  Admitted to the [facility] ICU on 6/28/2021 from [facility] in [place] for septic shock due to necrotizing pancreatitis. CT on admit showing mature collection with enhancing wall measuring ~[id number]. IR placed perc drain 6/29. Repeat imaging 7/18 revealed walled off necrotic collection amenable to endoscopic transluminal drainage with cystgastrostomy stenting with necrosectomy on 7/21/21. Complete necrosectomy on 8/11. His left flank drain was clamped 8/16 with a plan to remove on 8/24. Antibiotics will be discontinued once drain removed.     Abx:  Cefepime and metronidazole   (Linezolid 7/21-7/30, Meropenem 6/29-7/18, Ceftriaxone 7/21-7/24; 7/26-7/27 Zosyn 7/27-8/6)  BCx: NGTD since 5/18/2021     Gastric Outlet Obstruction  High G tube  output due to the cystogastric connection/fluid drainage noted on 8/5. Per GI, possibly related to gastric outlet from severe abdominal inflammation and is expected. KUB negative for obstructive gas pattern. Continues to have output, but most of it is what he is taking PO.      Right Pancreatic Hydrothorax, stable  Increased dyspnea with CXR on 7/19 with further imaging and thoracentesis confirming a hepatic/pancreatic hydrothorax. Thoracentesis PRN for dyspnea.     Alcoholic Hepatitis  Coagulation Defect  Received 1 week of steroids at [facility] for elevated INR and altered mental status. Not a liver transplant candidate. Mental status improved with aggressive lactulose and rifaxamin, which was discontinued without return of AMS.     Hepatic Fluid collection vs Abscess  CT Ab/pelvis from 8/13 and 8/21 showing a 3.0 x 2.7 cm hepatic fluid collection. IR consulted and recommended continue ABx without intervention until drain removed. CT imaging on 8/23 shows fluid collection reduced in size measuring 2 x 2.1cm.      ESRD due to ATN  Stage III RADHA due to ATN from septic shock without recovery for >3 months. Line is TDC from [facility]. Nephrology dialyzed on Tu, Th, Sa schedule.  - Will need follow-up and outpatient dialysis     Anemia due to Chronic Illness  Hematochezia, resolved  Hemoperitoneum  Baseline hgb 17. Running between 6-8 in setting of chronic illness, new ESRD with epocrit per Nephrology, frequent lab draws and procedures. Hematochezia on 8/8 after starting standard intensity heparin, now resolved. Paracentesis on 8/21 with hemoperitoneum. No active bleeding.   - Goal hgb > 7.0, trend Hgb     Alcohol Use Disorder  Counseled patient on complete alcohol cessation. Not interested in additional services at this time. Continue daily folic acid and thiamine.     Lactic Acidosis  Intermittent Lactic acidosis in the setting of sepsis, ESRD, bleeding.     Hypokalemia  Noted in the setting of high gastric output and  improved once G-tube output decreased. CTM.      Severe Malnutrition  Poor oral intake in the setting of necrotizing pancreatitis. PEG tube placed 7/13. Dietitian consulted and continuous tube feeds with goal of 65 ml/hr with continuous pancreatic enzymes  - Continuous tube feeds decreased to 50 mL overnight.       Diet: Regular Diet Adult  Adult Formula Drip Feeding: Continuous Vital 1.5; Jejunostomy; Goal Rate: 50; mL/hr; Medication - Feeding Tube Flush Frequency: At least 15-30 mL water before and after medication administration and with tube clogging; Amount to Send (Nutrition us...    DVT Prophylaxis: Pneumatic Compression Devices  Rdz Catheter: Not present  Fluids: 10 mL NS TKO  Central Lines: PRESENT  PICC Triple Lumen 06/28/21 Left-Site Assessment: WDL except  CVC Double Lumen Right-Site Assessment: WDL  Code Status: Full Code      Disposition Plan   Expected discharge: 3-4 days, recommended to transitional care unit or LTACH once dialysis plan in place and tolerating drain removal and discontinuation of antibiotics. .      Patient was seen by and discussed with attending physician, Dr. Gregory.     Qamar Jackson MD  Med/Peds, PGY-2  [phone]  [address]  Securely message with the Vocera Web Console (learn more here)  Text page via [employer] Paging/Directory  Please see sign in/sign out for up to date coverage information  _____________________________________________________________________    Interval History    Patient with significant gastric output - 2.6L in the past 24hrs, though likely representative of what he is drinking PO. Continues to struggle with nausea, but notes it is improved today. GI is amenable to discharge in the coming days, will follow up in 1mo. IR to pull drain this afternoon vs tomorrow morning - will discontinue antibiotics at that time. Dialysis today.     Physical Exam   Vital Signs: Temp: 97.5  F (36.4  C) Temp src:  Axillary BP: 116/58 Pulse: (!) 131   Resp: 18 SpO2: 96 % O2 Device: Nasal cannula Oxygen Delivery: 4 LPM  Weight: 151 lbs .24 oz  Constitutional: Awake, non-diaphoretic, conversational, HD running  Cardiovascular: tachycardic, no new murmur noted, no edema  Respiratory: Tachypnic, decreased breath sounds on right which are unchanged, NC on for comfort  Abdomen: Tolerates palpation with minimal pain, PEG-tube site clean, left flank drain covered.   Musculoskeletal: Emaciated, moving all extremities on examination  Skin: no Jaundice    Data   Recent Labs   Lab 08/24/21  0536 08/23/21  1733 08/23/21  1146 08/22/21  0540 08/20/21  1726 08/20/21  0610   WBC 12.7*  --  14.1* 14.5*   < > 13.7*   HGB 7.5*  --  8.0* 8.0*   < > 7.4*   MCV 92  --  94 90   < > 94     --  357 327   < > 337   INR  --   --   --   --   --  1.88*   *  --  132* 131*   < > 132*   POTASSIUM 3.8  --  3.9 4.1   < > 3.9   CHLORIDE 90*  --  90* 96   < > 100   CO2 33*  --  32 28   < > 25   BUN 47*  --  36* 21   < > 20   CR 2.95*  --  2.49* 1.72*   < > 1.62*   ANIONGAP 9  --  10 7   < > 7   JANENE 8.3*  --  8.4* 8.7   < > 7.9*   * 107* 102* 85   < > 128*   ALBUMIN 1.5*  --  1.5* 1.6*   < > 1.4*   PROTTOTAL 7.2  --  7.4 7.4   < > 6.9   BILITOTAL 1.5*  --  1.6* 1.8*   < > 1.5*   ALKPHOS 96  --  97 101   < > 111   ALT 17  --  19 20   < > 20   AST 39  --  46* 46*   < > 40    < > = values in this interval not displayed.

## 2021-08-24 NOTE — PLAN OF CARE
Assumed Cares: [id number]      Significant Events: PRN dilaudid x1, PRN atarax x1. Approximately 500ml drainage from G tube.     Vitals: Tachycardic but otherwise stable on 4lpm via NC.   LDAs: Left PICC, grey line occluded, other two infusing TKO.   Neuro: WDL   Behavior: WDL, appropriate for situation  Respiratory: WDL, diminished in lower lobes. No cough this shift. Shallow breathing noted.   Cardiac: Tachycardia, denies chest pain or difficulty breathing.   GI/: J tube infusing TF at 50ml/hr. G tube hooked to gravity drainage. Green output noted. Intermittent nausea   Mobility: Assist of 2, lift if OOB.   Nutrition: Tube feeding at 50ml/hr, at goal. Regular diet, no appetite.   Pain: Scheduled oxy given, prn dilaudid x1, atarax x1,     Plan of care:  Continue with POC, update treatment team as needed with changes. Dialysis in AM/

## 2021-08-24 NOTE — PLAN OF CARE
[id number]:  Patient continues to report abd pain with some relief from PRN Dilauded. No appetite for dinner despite strong encouragement. Says that he has a lot of food in his room that was brought in by family earlier in his stay that he could eat if he got hungry. TF still running at goal rate of 50 ml/hr. Triple lumen PICC intact with all lumens drawing back blood and flushing well. Resting in between cares and using laptop. Will continue with plan of care.

## 2021-08-24 NOTE — PLAN OF CARE
Assumed care [id number]    Vitals: VSS on 4L O2 ex tachycardic  Pain: Reported abdominal pain this AM. Relief from PRN dilaudid and scheduled oxy.   Neuro: A&Ox4. Slept majority of the day. Calls appropriately.    Cardiac: Tachycardic  Respiratory: Tachypneic. Accessory muscle use.  GI/: On HD. Loose, watery BM x2. Distended/firm abdomen. GJ tube WNL; tube feedings through J tube @ 50 mL/hr w/q4h 60 mL flushes. G tube to gravity drainage; large amount of green output. Emesis x 2- zofran given x1 with relief. No oral intake ex a few ice chips today.   IV/Drains: Left triple lumen PICC; white lumen infusing NS @ TKO w/intermittent abx; red saline locked; grey occluded. GJ tube. Left peritoneal drain clamped and WNL.   Activity: Ax2 w/ lift. Did not get OOB today.   Skin: Jaundiced. Blanchable redness on buttocks- barrier cream applied. BL heels peeling. Excoriation on sacrum- barrier cream applied. Dressings on GJ tube and peritoneal drain changed.     Dialysis tomorrow at 0840. Continue POC.

## 2021-08-24 NOTE — PROGRESS NOTES
Rapid Response Team Note    Assessment   In assessment a rapid response was called on Dax Villareal due to SIRS/Sepsis trigger and lactic acidosis. This presentation is likely due to necrotizing pancreatitis  and worsened by ESRD.     Plan   -  Recheck lactic acid in AM      -  The Internal Medicine primary team was able to be reached and they are in agreement with the above plan.  -  Disposition: The patient will remain on the current unit. We will continue to monitor this patient closely.  -  Reassessment and plan follow-up will be performed by the primary team      SOFIA Velez CNP  [facility] R[facility] AMCOM Job Code Contact #[phone]    Hospital Course   Brief Summary of events leading to rapid response:   BPA for sepsis eval due to leukocytosis and tachycardia. Tachycardia and leukocytosis has been on-going.    Admission Diagnosis:   Pancreatitis [K85.90]     Physical Exam   Temp: 97.9  F (36.6  C) Temp  Min: 97.2  F (36.2  C)  Max: 98.4  F (36.9  C)  Resp: 16 Resp  Min: 16  Max: 20  SpO2: 95 % SpO2  Min: 93 %  Max: 95 %  Pulse: 115 Pulse  Min: 115  Max: 120    No data recorded  BP: 105/49 Systolic (24hrs), Av , Min:94 , Max:106   Diastolic (24hrs), Av, Min:41, Max:49     I/Os: I/O last 3 completed shifts:  In: 2195 [I.V.:300; NG/GT:695]  Out: 3325 [Urine:50; Emesis/NG output:3275]     Exam:   General: chronically ill appearing  Mental Status: AAOx4.      Significant Results and Procedures   Lactic Acid:   Recent Labs   Lab Test 21  0044 21  2235 21  2228 21  2215 21  2116 21  2106 21  2106   LACT 2.1* 1.4 1.7   < >  --   --   --    LACTS  --   --   --   --  1.1 0.9 1.5    < > = values in this interval not displayed.     CBC:   Recent Labs   Lab Test 21  1146 21  0540 21  1352   WBC 14.1* 14.5* 13.2*   HGB 8.0* 8.0* 7.8*   HCT 25.5* 24.3* 24.5*    327 327        Sepsis Evaluation   The patient is known to have an  infection.  NO EVIDENCE OF SEPSIS at this time.  Vital sign, physical exam, and lab findings are due to ESRD.

## 2021-08-24 NOTE — PROGRESS NOTES
Nephrology Progress Note  08/24/2021         Dax Villareal is a 30 year old male with h/o ETOH hepatitis, end stage liver disease, RADHA on HD, transferred from [facility] in [place] for septic shock on 6/28/21 for treatment of necrotizing pancreatitis and decompensated liver disease. He was initially admitted to [facility] 5/19/21 and started on RRT 5/26/2021.        Interval History :   Mr Villareal had necrosectomy for 4th time on 8/11 with no more anticipated.  Running HD on TTS schedule, has had large NG output the past 3 days which has made him net negative on his own.  Pulling 2L with run today as BP's are stable.        Assessment & Recommendations:   RAHDA nearing ESRD-Cr was 0.8 as recently as 5/19/2021 but now HD dependent for 3 months, started RRT at [facility] prior to transfer to [facility] on 5/26.  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  continuing restorative cares for now, BP's have been stable enough to support HD.  Had GI stenting of pancreas x4.                 -Line is TDC from PTA               - HD on TTS schedule unless issues arise requiring change in plan.      Volume status-Minimal UOP, large NG output the past 3 days so is negative on his own, pulling 2L as BP's are stable but would back off for any issues as he appears close to euvolemic.       Electrolytes/pH-K 3.8, bicarb 33, no acute issues.       Ca/phos/pth-Ca 8.4, corrects to normal with low albumin.  Mg and Phos reasonable.       Anemia-Hgb low at 8, acute management per team, last PRBC's 7/14.  Started on EPO 5k with runs, iron sats 43%      Nutrition-Taking PO, appetite reasonable.       Seen and discussed with Dr Ryan      Recommendations were communicated to primary team via note       Tor Larsen, SOFIA CNS  Clinical Nurse Specialist  [phone]    Review of Systems:   I reviewed the following systems:  Gen: No fevers or chills  CV: No CP at  "rest  Resp: No SOB at rest  GI: No N/V    Physical Exam:   I/O last 3 completed shifts:  In: 1805 [I.V.:230; NG/GT:375]  Out: 1800 [Emesis/NG output:1800]   /56   Pulse (!) 124   Temp 97.5  F (36.4  C) (Axillary)   Resp 18   Ht 1.676 m (5' 6\")   Wt 68.5 kg (151 lb 0.2 oz)   SpO2 94%   BMI 24.37 kg/m       GENERAL APPEARANCE: Lying in bed, in no distress.    EYES:  scleral icterus, pupils equal  Pulmonary: decreased BS   CV: tachy   - Edema - no LE edema  GI: mild distention, non-tender. Has pancreatic drain  MS: no evidence of inflammation in joints, no muscle tenderness  SKIN: no rash, warm, dry, no cyanosis  NEURO: alert/interactive  ACCESS: Right TDC       Labs:   All labs reviewed by me  Electrolytes/Renal - Recent Labs   Lab Test 08/24/21  0536 08/23/21  1733 08/23/21  1146 08/22/21  0540   *  --  132* 131*   POTASSIUM 3.8  --  3.9 4.1   CHLORIDE 90*  --  90* 96   CO2 33*  --  32 28   BUN 47*  --  36* 21   CR 2.95*  --  2.49* 1.72*   * 107* 102* 85   JANENE 8.3*  --  8.4* 8.7   MAG 2.2  --  2.1 1.7   PHOS 8.6*  --  7.1* 3.7       CBC -   Recent Labs   Lab Test 08/24/21  0536 08/23/21  1146 08/22/21  0540   WBC 12.7* 14.1* 14.5*   HGB 7.5* 8.0* 8.0*    357 327       LFTs -   Recent Labs   Lab Test 08/24/21  0536 08/23/21  1146 08/22/21  0540   ALKPHOS 96 97 101   BILITOTAL 1.5* 1.6* 1.8*   ALT 17 19 20   AST 39 46* 46*   PROTTOTAL 7.2 7.4 7.4   ALBUMIN 1.5* 1.5* 1.6*       Iron Panel -   Recent Labs   Lab Test 08/14/21 2055 07/19/21  0632   IRON 28* 31*   IRONSAT 21 43   JERRELL 2,186*  --            Current Medications:    sodium bicarbonate  325 mg Per Feeding Tube Q4H    And     amylase-lipase-protease  1-2 capsule Per Feeding Tube Q4H     amylase-lipase-protease  2 capsule Oral TID w/meals     B and C vitamin Complex with folic acid  5 mL Per Feeding Tube Daily     banatrol plus  1 packet Per Feeding Tube TID     ceFEPIme (MAXIPIME) IV  1 g Intravenous Q24H     folic acid  1 mg " Oral Daily     melatonin  6 mg Oral At Bedtime     menthol   Transdermal Q8H     metroNIDAZOLE  500 mg Intravenous Q8H     - MEDICATION INSTRUCTIONS -   Does not apply Once     oxyCODONE  5 mg Oral Q8H     pantoprazole (PROTONIX) IV  40 mg Intravenous Daily with breakfast     protein modular  1 packet Per Feeding Tube BID     sodium chloride (PF)  3 mL Intracatheter Q8H     sodium chloride (PF)  73 mL Intravenous Once     sodium chloride (PF)  75 mL Intravenous Once     sodium chloride (PF)  86 mL Intravenous Once     vitamin B1  100 mg Oral Daily    Or     thiamine  100 mg Intravenous Daily       - MEDICATION INSTRUCTIONS -       dextrose       heparin (porcine) 1,000 Units/hr (08/24/21 0907)

## 2021-08-24 NOTE — PROGRESS NOTES
08/24/21 0100   Call Information   Date of Call 08/24/21   Time of Call 0142   Name of person requesting the team Mae SCOTT   Title of person requesting team RN   RRT Arrival time 0147   Time RRT ended 0200   Reason for call   Type of RRT Adult   Primary reason for call Sepsis suspected   Sepsis Suspected Elevated Lactate level;Heart Rate > 100;WBC <4 or >12   Was patient transferred from the ED, ICU, or PACU within last 24 hours prior to RRT call? No   SBAR   Situation Lactic Acid 2.1   Background 30 year old male with PMHx significant for alcohol use disorder who has had a prolonged hospitalization at [facility] in [place] for acute alcohol hepatitis/end stage liver disease complicated by hepatic encephalopathy, acute renal failure requiring hemodialysis, acute hypoxemic respiratory failure and septic shock secondary to acute necrotizing pancreatitis. Transferred to [facility] ICU on 6/28/2021 for further management of necrotizing pancreatitis    Notable History/Conditions End-Stage disease;Organ failure;Recent surgery   Assessment A&O x4, no respiratory distress, able to make needs known   Interventions No interventions   Patient Outcome   Patient Outcome Stabilized on unit   RRT Team   Date Attending Physician notified 08/24/21   Physician(s) SOFIA Molina CNP   Lead RN Ruperto SCOTT   RT Delvin   Post RRT Intervention Assessment   Post RRT Assessment Stable/Improved   Date Follow Up Done 08/24/21   Time Follow Up Done 0420   Comments Patient resting comfortably. Plan for lactic acid recheck in the AM.

## 2021-08-24 NOTE — CODE/RAPID RESPONSE
0142 Code sepsis called. Lactic acid is 2.1. WBC is 14.1 and . Vital signs are being taken now.    New order written by SOFIA Verde that patient is ALREADY being treated for sepsis.    Vital signs: 96.6, 18, 119, 97*51, 95% RA.

## 2021-08-24 NOTE — PLAN OF CARE
Assumed care [id number]     Vitals: VSS on 4L O2 ex tachycardic  Pain: Reported abdominal pain this AM. Relief from PRN dilaudid and scheduled oxy.   Neuro: A&Ox4. More alert today. Calls appropriately.    Cardiac: Tachycardic  Respiratory: Tachypneic at times. Accessory muscle use.  GI/: On HD. Loose, watery BM x1. Distended/firm abdomen. GJ tube WNL; tube feedings through J tube @ 50 mL/hr w/q4h 60 mL flushes. G tube to gravity drainage; large amount of green output. Poor appetite.  IV/Drains: Left triple lumen PICC; grey lumen occluded. GJ tube. Left peritoneal drain clamped and WNL- possible removal of drain today or tomorrow in IR.   Activity: Ax2 w/ lift. Did not get OOB today.   Skin: Jaundiced. Blanchable redness on buttocks- barrier cream applied. BL heels peeling. Excoriation on sacrum- barrier cream applied. GJ tube dressing changed.     Continue POC

## 2021-08-24 NOTE — CONSULTS
"Smoking Cessation Consult   2021    Patient: Dax Villareal      :  1990                    MRN:[id number]      Pancreatitis [K85.90] @HX    History of Present Illness: Dax Villareal is a 30 year old male with PMHx significant for alcohol use disorder who has had a prolonged hospitalization at [facility] in [place] for acute alcohol hepatitis/end stage liver disease complicated by hepatic encephalopathy, acute renal failure requiring hemodialysis, acute hypoxemic respiratory failure and septic shock secondary to acute necrotizing pancreatitis. Transferred to [facility] ICU on 2021 for further management of necrotizing pancreatitis by interventional GI, possible necrosectomy.    Reason for Consult: Patient identified as current smoker per patient chart.        Not willing to discuss current tobacco use; not open to developing smoking cessation plan; however, is interested in nicotine replacement therapy while in the hospital. Denies experiencing symptoms of withdrawal but is experiencing cravings to smoke.     Patient did accept a copy of the \"Quitting for Good with Treatment and Support\" workbook. Reviewed contents of workbook with patient.    Patient would like to use 4 mg lozenge while admitted. Medical team was notified about patients request and will place order.    Ida Navarro, DERRICK, RRT, CTTS  Chronic Pulmonary Disease Specialist  Office: [phone]   Pager: [phone]              "

## 2021-08-24 NOTE — PROVIDER NOTIFICATION
High. 5B. 5236. K.R. M3. Re: code sepsis for lactic acid of 2.1.  WBC 14.1. . APRN TICO Verde stated 'will recheck lactic acid in the morning'. Patient is already being treated for sepsis. Mae RIVAS. [id number]. [phone].    Text page sent to Dr. Simmons. [phone] via [employer]. Call back received from MD confirming receipt and plan to recheck lactic acid in the morning.

## 2021-08-25 ENCOUNTER — APPOINTMENT (OUTPATIENT)
Dept: PHYSICAL THERAPY | Facility: CLINIC | Age: 31
End: 2021-08-25
Attending: INTERNAL MEDICINE
Payer: MEDICAID

## 2021-08-25 ENCOUNTER — APPOINTMENT (OUTPATIENT)
Dept: INTERVENTIONAL RADIOLOGY/VASCULAR | Facility: CLINIC | Age: 31
End: 2021-08-25
Attending: NURSE PRACTITIONER
Payer: MEDICAID

## 2021-08-25 LAB
ALBUMIN SERPL-MCNC: 1.7 G/DL (ref 3.4–5)
ALP SERPL-CCNC: 102 U/L (ref 40–150)
ALT SERPL W P-5'-P-CCNC: 20 U/L (ref 0–70)
ANION GAP SERPL CALCULATED.3IONS-SCNC: 9 MMOL/L (ref 3–14)
AST SERPL W P-5'-P-CCNC: 49 U/L (ref 0–45)
BACTERIA FLD CULT: NO GROWTH
BACTERIA PLR CULT: NO GROWTH
BILIRUB SERPL-MCNC: 1.6 MG/DL (ref 0.2–1.3)
BUN SERPL-MCNC: 27 MG/DL (ref 7–30)
CALCIUM SERPL-MCNC: 8.7 MG/DL (ref 8.5–10.1)
CHLORIDE BLD-SCNC: 94 MMOL/L (ref 94–109)
CO2 SERPL-SCNC: 26 MMOL/L (ref 20–32)
CREAT SERPL-MCNC: 2.14 MG/DL (ref 0.66–1.25)
ERYTHROCYTE [DISTWIDTH] IN BLOOD BY AUTOMATED COUNT: 17.6 % (ref 10–15)
GFR SERPL CREATININE-BSD FRML MDRD: 40 ML/MIN/1.73M2
GLUCOSE BLD-MCNC: 131 MG/DL (ref 70–99)
HCT VFR BLD AUTO: 26.4 % (ref 40–53)
HGB BLD-MCNC: 8.2 G/DL (ref 13.3–17.7)
LACTATE SERPL-SCNC: 1.8 MMOL/L (ref 0.7–2)
MAGNESIUM SERPL-MCNC: 1.9 MG/DL (ref 1.6–2.3)
MCH RBC QN AUTO: 29.5 PG (ref 26.5–33)
MCHC RBC AUTO-ENTMCNC: 31.1 G/DL (ref 31.5–36.5)
MCV RBC AUTO: 95 FL (ref 78–100)
PHOSPHATE SERPL-MCNC: 6.3 MG/DL (ref 2.5–4.5)
PLATELET # BLD AUTO: 332 10E3/UL (ref 150–450)
POTASSIUM BLD-SCNC: 3.9 MMOL/L (ref 3.4–5.3)
PROT SERPL-MCNC: 8.2 G/DL (ref 6.8–8.8)
RBC # BLD AUTO: 2.78 10E6/UL (ref 4.4–5.9)
SODIUM SERPL-SCNC: 129 MMOL/L (ref 133–144)
WBC # BLD AUTO: 12.5 10E3/UL (ref 4–11)

## 2021-08-25 PROCEDURE — 0DPW30Z REMOVAL OF DRAINAGE DEVICE FROM PERITONEUM, PERCUTANEOUS APPROACH: ICD-10-PCS | Performed by: RADIOLOGY

## 2021-08-25 PROCEDURE — 49424 ASSESS CYST CONTRAST INJECT: CPT | Performed by: RADIOLOGY

## 2021-08-25 PROCEDURE — 20501 NJX SINUS TRACT DIAGNOSTIC: CPT

## 2021-08-25 PROCEDURE — 250N000013 HC RX MED GY IP 250 OP 250 PS 637: Performed by: INTERNAL MEDICINE

## 2021-08-25 PROCEDURE — 250N000011 HC RX IP 250 OP 636: Performed by: STUDENT IN AN ORGANIZED HEALTH CARE EDUCATION/TRAINING PROGRAM

## 2021-08-25 PROCEDURE — 250N000011 HC RX IP 250 OP 636: Performed by: INTERNAL MEDICINE

## 2021-08-25 PROCEDURE — 97530 THERAPEUTIC ACTIVITIES: CPT | Mod: GP | Performed by: PHYSICAL THERAPIST

## 2021-08-25 PROCEDURE — 82040 ASSAY OF SERUM ALBUMIN: CPT | Performed by: INTERNAL MEDICINE

## 2021-08-25 PROCEDURE — C1769 GUIDE WIRE: HCPCS

## 2021-08-25 PROCEDURE — C9113 INJ PANTOPRAZOLE SODIUM, VIA: HCPCS | Performed by: INTERNAL MEDICINE

## 2021-08-25 PROCEDURE — 250N000013 HC RX MED GY IP 250 OP 250 PS 637: Performed by: STUDENT IN AN ORGANIZED HEALTH CARE EDUCATION/TRAINING PROGRAM

## 2021-08-25 PROCEDURE — 83605 ASSAY OF LACTIC ACID: CPT | Performed by: STUDENT IN AN ORGANIZED HEALTH CARE EDUCATION/TRAINING PROGRAM

## 2021-08-25 PROCEDURE — 76080 X-RAY EXAM OF FISTULA: CPT | Mod: 26 | Performed by: RADIOLOGY

## 2021-08-25 PROCEDURE — 99231 SBSQ HOSP IP/OBS SF/LOW 25: CPT | Mod: GC | Performed by: INTERNAL MEDICINE

## 2021-08-25 PROCEDURE — G0463 HOSPITAL OUTPT CLINIC VISIT: HCPCS

## 2021-08-25 PROCEDURE — 85027 COMPLETE CBC AUTOMATED: CPT | Performed by: INTERNAL MEDICINE

## 2021-08-25 PROCEDURE — 83735 ASSAY OF MAGNESIUM: CPT | Performed by: INTERNAL MEDICINE

## 2021-08-25 PROCEDURE — 84100 ASSAY OF PHOSPHORUS: CPT | Performed by: INTERNAL MEDICINE

## 2021-08-25 PROCEDURE — 36592 COLLECT BLOOD FROM PICC: CPT | Performed by: STUDENT IN AN ORGANIZED HEALTH CARE EDUCATION/TRAINING PROGRAM

## 2021-08-25 PROCEDURE — 120N000002 HC R&B MED SURG/OB UMMC

## 2021-08-25 PROCEDURE — 99233 SBSQ HOSP IP/OBS HIGH 50: CPT | Performed by: PHYSICIAN ASSISTANT

## 2021-08-25 PROCEDURE — 36592 COLLECT BLOOD FROM PICC: CPT | Performed by: INTERNAL MEDICINE

## 2021-08-25 PROCEDURE — 80053 COMPREHEN METABOLIC PANEL: CPT | Performed by: INTERNAL MEDICINE

## 2021-08-25 PROCEDURE — 250N000009 HC RX 250: Performed by: RADIOLOGY

## 2021-08-25 RX ORDER — LIDOCAINE HYDROCHLORIDE 10 MG/ML
1-30 INJECTION, SOLUTION EPIDURAL; INFILTRATION; INTRACAUDAL; PERINEURAL
Status: COMPLETED | OUTPATIENT
Start: 2021-08-25 | End: 2021-08-25

## 2021-08-25 RX ADMIN — FOLIC ACID 1 MG: 1 TABLET ORAL at 09:13

## 2021-08-25 RX ADMIN — HYDROXYZINE HYDROCHLORIDE 50 MG: 25 TABLET, FILM COATED ORAL at 09:12

## 2021-08-25 RX ADMIN — OXYCODONE HYDROCHLORIDE 5 MG: 5 SOLUTION ORAL at 20:05

## 2021-08-25 RX ADMIN — OXYCODONE HYDROCHLORIDE 5 MG: 5 SOLUTION ORAL at 04:46

## 2021-08-25 RX ADMIN — MELATONIN TAB 3 MG 6 MG: 3 TAB at 23:14

## 2021-08-25 RX ADMIN — HYDROXYZINE HYDROCHLORIDE 50 MG: 25 TABLET, FILM COATED ORAL at 23:14

## 2021-08-25 RX ADMIN — SODIUM BICARBONATE 325 MG: 325 TABLET ORAL at 04:46

## 2021-08-25 RX ADMIN — HYDROMORPHONE HYDROCHLORIDE 1 MG: 1 SOLUTION ORAL at 08:04

## 2021-08-25 RX ADMIN — SODIUM BICARBONATE 325 MG: 325 TABLET ORAL at 09:12

## 2021-08-25 RX ADMIN — PANCRELIPASE 2 CAPSULE: 24000; 76000; 120000 CAPSULE, DELAYED RELEASE PELLETS ORAL at 04:46

## 2021-08-25 RX ADMIN — NICOTINE POLACRILEX 4 MG: 2 LOZENGE ORAL at 09:12

## 2021-08-25 RX ADMIN — PANCRELIPASE 2 CAPSULE: 24000; 76000; 120000 CAPSULE, DELAYED RELEASE PELLETS ORAL at 16:00

## 2021-08-25 RX ADMIN — PANCRELIPASE 2 CAPSULE: 24000; 76000; 120000 CAPSULE, DELAYED RELEASE PELLETS ORAL at 20:05

## 2021-08-25 RX ADMIN — Medication 1 PACKET: at 09:13

## 2021-08-25 RX ADMIN — PANTOPRAZOLE SODIUM 40 MG: 40 INJECTION, POWDER, FOR SOLUTION INTRAVENOUS at 09:13

## 2021-08-25 RX ADMIN — SODIUM BICARBONATE 325 MG: 325 TABLET ORAL at 12:42

## 2021-08-25 RX ADMIN — SODIUM BICARBONATE 325 MG: 325 TABLET ORAL at 01:17

## 2021-08-25 RX ADMIN — HYDROMORPHONE HYDROCHLORIDE 1 MG: 1 SOLUTION ORAL at 01:17

## 2021-08-25 RX ADMIN — METRONIDAZOLE 500 MG: 500 INJECTION, SOLUTION INTRAVENOUS at 12:42

## 2021-08-25 RX ADMIN — SODIUM BICARBONATE 325 MG: 325 TABLET ORAL at 20:06

## 2021-08-25 RX ADMIN — HYDROMORPHONE HYDROCHLORIDE 1 MG: 1 SOLUTION ORAL at 16:00

## 2021-08-25 RX ADMIN — SODIUM BICARBONATE 325 MG: 325 TABLET ORAL at 16:00

## 2021-08-25 RX ADMIN — Medication 1 PACKET: at 20:06

## 2021-08-25 RX ADMIN — Medication 1 PACKET: at 13:59

## 2021-08-25 RX ADMIN — Medication 5 ML: at 16:00

## 2021-08-25 RX ADMIN — Medication 5 ML: at 09:13

## 2021-08-25 RX ADMIN — PANCRELIPASE 2 CAPSULE: 24000; 76000; 120000 CAPSULE, DELAYED RELEASE PELLETS ORAL at 09:13

## 2021-08-25 RX ADMIN — PANCRELIPASE 2 CAPSULE: 24000; 76000; 120000 CAPSULE, DELAYED RELEASE PELLETS ORAL at 12:42

## 2021-08-25 RX ADMIN — PANCRELIPASE 2 CAPSULE: 24000; 76000; 120000 CAPSULE, DELAYED RELEASE PELLETS ORAL at 01:16

## 2021-08-25 RX ADMIN — ONDANSETRON 4 MG: 2 INJECTION INTRAMUSCULAR; INTRAVENOUS at 13:59

## 2021-08-25 RX ADMIN — OXYCODONE HYDROCHLORIDE 5 MG: 5 SOLUTION ORAL at 12:42

## 2021-08-25 RX ADMIN — THIAMINE HYDROCHLORIDE 100 MG: 100 INJECTION, SOLUTION INTRAMUSCULAR; INTRAVENOUS at 09:11

## 2021-08-25 RX ADMIN — LIDOCAINE HYDROCHLORIDE 18 ML: 10 INJECTION, SOLUTION EPIDURAL; INFILTRATION; INTRACAUDAL; PERINEURAL at 10:31

## 2021-08-25 RX ADMIN — METRONIDAZOLE 500 MG: 500 INJECTION, SOLUTION INTRAVENOUS at 04:47

## 2021-08-25 ASSESSMENT — ACTIVITIES OF DAILY LIVING (ADL)
ADLS_ACUITY_SCORE: 17

## 2021-08-25 ASSESSMENT — MIFFLIN-ST. JEOR: SCORE: 1612.75

## 2021-08-25 ASSESSMENT — PAIN DESCRIPTION - DESCRIPTORS: DESCRIPTORS: ACHING

## 2021-08-25 NOTE — PROGRESS NOTES
CLINICAL NUTRITION SERVICES - REASSESSMENT NOTE     Nutrition Prescription    RECOMMENDATIONS FOR MDs/PROVIDERS TO ORDER:  Could consider phos binders given elevated phos ( while on non renal tube feeds formulary)    Malnutrition Status:    Severe malnutrition in the context of acute on chronic illness     Recommendations already ordered by Registered Dietitian (RD):  No changes to TF support      Future/Additional Recommendations:  Monitor phos, need to switch over to Novasource Renal formula        EVALUATION OF THE PROGRESS TOWARD GOALS   Diet:   Regular diet     Nutrition Support: ( on TF since 6/30 admit)  Access: PEG-J, tube feed via Jejunostomy (s/p PEG-J placement on 7/13 for Gastric outlet obstruction)    Dosing wt: lowest wt this admit 67.5 kg (8/12)    EN: Vital 1.5 Leno @ goal of 50 ml/hr (1200 ml/day)  --Provision: 1800 kcals, 81 gm PRO, 916 ml free H2O, 224 gm CHO, and 7 gm soluble fiber daily.    Modules:   # Soluble Fiber packets, Banatrol plus, one packet TID  ( 40 kcal per packet + 2 gm soluble fiber per packet).     # Protein supplementation via FT (Prosource TF Free, vegan protein supplement), 1 packet BID. Provides additional 180 kcal and 30 gm protein.    -TF at goal infusion @ 50 ml/hr  + Prosource TF Free => 1980 kcal (29 kcal/kg) and 111 gm protein ( 1.3 gm/kg)     FWF: 60 mL q 4 hours (8/6) + 30 mL Q 4 hours (7/13)     PERT: Creon 24, 2 capsules + bicarb solution, Q 4 hours via FT->  providing 3512 units lipase/total gm fat in tube feeds.      GI: Stool x 7 yesterday, liquid stool daily noted 5-8 daily ( On Banatrol plus, 1 packet TID via FT also on PERT), Not on lactulose therapy, however antibioticus may be contributing to daily loose stool.       Intake:   PO: Met with patient today. Patient reports not eating much. Attributed to low appetite. Discussed option to cycle tube feed to stimulate appetite. Patient would like to stay on continuous TF support.     EN:  Average 7 day of EN intake  (910 ml daily) ( met 76% of the TF goal volume + protein supplement => provided patient with 1550  kcal/day ( 23 kcal/kg) and 92 gm protein (1.3  gm /kg/day).       Updated 8/19: ASSESSED NUTRITION NEEDS per 68 kg driest wt on 8/12 :  Estimated Energy Needs: 25-30 ++kcals/day, (0161-1133 ++ kcals/kg)   Justification: ++ for hypermetabolism with pancreatitis/HD and liver disease   Estimated Protein Needs: 102 + g/day (1.5 ++g/kg)   Justification: pancreatitis and Hypercatabolism with critical illness / HD repletion   Estimated Fluid Needs: Per provider pending fluid status (ESLD/HD/volume up)      NEW FINDINGS   Chart reviewed:   ----Transferred from OSH after prolonged hospitalization with septic shock. Patient was admitted on 6/28/2021 with acute respiratory failure and septic shock, necrotizing panc and decompensated liver.     --PMH: Alcoholic hepatitis c/b ESLD,  HE, ESRD requiring HD ( has been on HD x 3 month) and malnutrition in the setting of acute necrotizing pancreatitis, s/p PEG-J placement on 7/13.     --Admitted secondary bacterial peritonitis (treated)  --ESRD (HD dependent),   --HE,   --Necrotizing pancreatitis with infected pancreatic pseudocyst s/p RP Drain and Cystogastrostomy, and persistent right pleural effusion.   --8/25 - IR perc drain removed today, plan to stop antibiotics  --Multiple Necrosectomies, last one on 8/11    WO RN note on 8/25:  Peg tube wounds due to Moisture Associated Skin Damage (MASD)  Status: healing, now hypergranular tissue present    WT trend:   Admit wt: 80.2 kg (6/28/21)--> lowest wt this admit 67.5 kg (8/12)--> up to 71.5 kg most recent wt on 8/25.   EDW:N/A   Will continue to use dosing wt of 67.5 kg driest wt available.     Meds:  Sodium Bicarbonate/Creon, Creon with meals, nephronex 5 ml daily per FT, folic acid, Protonix, Thiamine  Flagyl infusion 500 mg every 8 hours  Not on Lactulose!     Labs:   CRP: 81 (8/7/21), WBC:12.5 (on 8/25 -> elevated), albumin: 1.7  (depleted)  Fecal elastase: 162 (L) on    BUN: 27, Cr: 2.14 (H) -> On HD ->  (K+:3.8, Mg++:1.9, Phos:6.3 )  Bicarb: 26 (normal range)   Total bili: 1.6 (trending down slowly), INR: 1.86 (elevated)   B (elevated)       MALNUTRITION  % Intake: On TF's with frequent interruptions   % Weight Loss: 11% net loss since admit -> severe ( also on HD/ascites)  Subcutaneous Fat Loss: Global Severe / cachectic appearing  Muscle Loss: Global Severe   Fluid Accumulation/Edema: 1+ Generalized edema ( trace)  Malnutrition Diagnosis: Severe malnutrition in the context of acute on chronic illness     Previous Goals   Total avg nutritional intake to meet a minimum of 25 kcal/kg and 1.3 g PRO/kg daily (per dosing wt 67.5 kg).  Evaluation: Not met for kcals.     Previous Nutrition Diagnosis  Inadequate protein-energy intake related to interruption to TF support and inadequate oral intake as evidenced by average 5 days TF met 65% of goal TF volume with minimal po intake over the past 5 days    Evaluation: No change    CURRENT NUTRITION DIAGNOSIS  Inadequate protein-energy intake related to interruption to TF support and inadequate oral intake as evidenced by average 5 days TF met 76% of goal TF volume with minimal po intake over the past 7 days      INTERVENTIONS  Implementation  Enteral Nutrition - No change   Feeding tube flush - no change today   Modules: ordered additional protein supplementation      Goals  Total avg nutritional intake to meet a minimum of 25 kcal/kg and 1.5 gm PRO/kg daily (per dosing wt 68 kg driest wt this admit on ).    Monitoring/Evaluation  Progress toward goals will be monitored and evaluated per protocol.      Jesica Blunt RD/MARYELLEN  Pager [phone]

## 2021-08-25 NOTE — PROGRESS NOTES
Care Management Follow Up    Length of Stay (days): 58    Expected Discharge Date: TBD     Concerns to be Addressed:     Discharge planning    Patient plan of care discussed at interdisciplinary rounds: Yes    Referrals Placed by CM/SW:      [facility] ~DECLINED see hr note.  [address] [address]   [facility] [phone] Fax [phone]      [facility] ~DECLINED d/t insurance; also patient would be light on acuity even though meets criteria and more complex patients may be priority admission.  [address] [place] [id number]  REFERRAL PHONE  [phone]  REFERRAL FAX  [phone]    Discussed with patient referrals to LTACH placement. Patient agreed and referrals sent.    [facility] will continue to follow and assist as needed.    Marisol Moya, RN    Marisol Moya RN, BSN  5B RN Care Coordinator  [phone] phone  [phone] pager    Weekend or Holiday Care Coordinator [phone] pager  Job Code [phone]

## 2021-08-25 NOTE — PROCEDURES
[facility]    Procedure: IR Procedure Note    Date/Time: 8/25/2021 10:40 AM  Performed by: Ranjit Diaz MD  Authorized by: Ranjit Diaz MD     UNIVERSAL PROTOCOL   Site Marked: NA  Prior Images Obtained and Reviewed:  Yes  Required items: Required blood products, implants, devices and special equipment available    Patient identity confirmed:  Verbally with patient, arm band, provided demographic data and hospital-assigned identification number  NA - No sedation, light sedation, or local anesthesia  Confirmation Checklist:  Patient's identity using two indicators, relevant allergies, procedure was appropriate and matched the consent or emergent situation and correct equipment/implants were available  Time out: Immediately prior to the procedure a time out was called    Universal Protocol: the Joint Commission Universal Protocol was followed    Preparation: Patient was prepped and draped in usual sterile fashion    ESBL (mL):  0         ANESTHESIA    Anesthesia: Local infiltration  Local Anesthetic:  Lidocaine 1% without epinephrine  Anesthetic Total (mL):  18      SEDATION    Patient Sedated: No    Fluoroscopy Time: 1 minute(s)  See dictated procedure note for full details.  Findings: Removed Thal-Quick drainage catheter over guidewire and fluoroscopic guidance. Cystogastrostomy drains unchanged in position. Sterile dressing applied.    Specimens: none    Complications: None    Condition: Stable    PROCEDURE   Patient Tolerance:  Patient tolerated the procedure well with no immediate complications    Length of time physician/provider present for 1:1 monitoring during sedation: 0

## 2021-08-25 NOTE — PROGRESS NOTES
[facility]    GASTROENTEROLOGY PROGRESS NOTE    ASSESSMENT/RECOMMENDATIONS:  30 year old male with a history of heavy alcohol use who presented to OSH 5/19 for abdominal pain, nausea and vomiting, admitted for alcohol hepatitis, ESLD, RADHA on HD, septic shock requiring pressors as well as necrotizing pancreatitis with large evolving necrotic collection. Transferred to [facility] for consideration of drainage of presumed infected necrotic collection. S/p EUS transluminal and percutaneous drainage.    #. Acute necrotizing pancreatitis with presumed infected walled off necrotic collection s/p percutaneous and endoscopic drainage   #. Septic shock, resolved  #. Leukocytosis, improved  #. Ascites/Peritonitis  Unclear evolution of pancreatitis history but had a recent CT scan with large evolving necrotic collection, presumed infected. No obvious gas in collection. CT on admission to [facility] showing mature collection with enhancing wall measuring ~[id number]. With profound coagulopathy (liver disease + malnutrition +sepsis), INR 3.8 on transfer he was given FFP and Vit K, IR placed perc drain 6/29. Repeat imaging 7/18 with still large walled off necrotic collection that appeared amenable to endoscopic transluminal drainage, underwent cystgastrostomy stenting on 7/21/21, extensive debridement/necrosectomy 8/6, final near complete/suffiecient debridement with evacuation of solid necrosis in right side of cavity 8/11,two 10 x 1 cm Solus stents placed across cavity, no current plans for future necrosectomies.  Etiology: alcohol  Date of onset: 5/19  BISAP score on admission: unclear  Concurrent organ failure: respiratory (now extubated), renal (on HD), liver, cardiogenic (weaned off pressors 6/29)  Thromboses: none  Diabetes: no  Current nutrition access: PEG-J + po diet  Last imaging: CT scan with IV contrast 8/23  Infection/antiinfectives:                 - Meropenem (6/29 - 7/18),  Ciprofloxacin (7/19 - 7/21), Vancomycin (6/29 - 6/30), Micafungin (6/29 - 7/1), Ceftriaxone (7/21 - 7/23), Linezolid (7/21 - 7/30), Zosyn (7/27-8/6), Cefepime (8/6 - present), Metronidazole (8/6 - present)    Intervention:                 6/30 - Left IR RP perc drain (24F)                 7/2 - Diagnostic paracentesis (WBC 2663 - 74% PMN, SAAG <1.1, protein 4.1)                  7/5 - Repeat Paracentesis (WBC 1801 - 61% PMN, lipase 48)                 7/13 - Diag/therapeutic paracentesis ()                 7/13 - PEG-J placement with T-tag gastropexy                 7/21 - Repeat paracentesis                 7/21 - EUS-cystgastrostomy, Axios with co-axial Viabil                 7/28 - EGD, stent replacement (transluminal Axios with coaxial Solus), PEG-J removal (buried bumper) and replacement of new PEG-J with one new T-tag                 8/6 - EGD with necrosectomy and near complete necrosectomy                 8/11 - EGD with completion necrosectomy, no e/o bowel fistula     8/25 - IR perc drain removed     -- no further necrosectomies anticipated  -- Perc drain removed today, plans to stop abx today as well  -- pain meds per primary team  -- from GI perspective, we are comfortable with discharge to TCU vs rehab center    #. Liver abscess vs infarct  Seen on CT (8/13/21), finish antibiotics today.    #. Severe malnutrition in the context of chronic illness  #. Gastric outlet obstruction s/p PEG-J placement  #. Mild to moderate EPI  #. Concern for bowel fistula  Patient with very poor oral intake over the course of his illness and was not meeting calorie requirements orally. S/p PEG-J with T-tag gastropexy (x3) 7/13. Overall improved.  -- g tube to gravity - could potentially trial capping this but does have intermittent nausea and vomiting  -- diet as tolerated  -- continue tube feeds with PERT     #. Hematochezia, resolved  Two episodes of BRBPR 8/8-8/9. Likely related to oozing after necrosectomy in the  "setting of new anticoagulation for questionable PE (CT Chest 8/8 with concern for PE). Hgb stable. Bleeding stopped after anticoagulation was stopped. Would continue to hold anticoagulation for now. Hemodynamics unchanged. CTA 8/10 without PE  -- follow H/H    #. Alcohol hepatitis, resolving  #. Hyperbilirubinemia  #. Coagulopathy  #. Encephalopathy, improved  Known heavy alcohol use. Reportedly received course of steroids at OSH for 1 week. Unclear if he has underlying cirrhosis but certainly would fit with alcohol hepatitis vs elevated liver tests of critical illness/sepsis. Hepatology notified of admission and they have weighed in on patient NOT being a liver transplant candidate at this time.  -- trend MELD labs  -- alcohol abstinence     Panc/bili GI team will follow peripherally but will not see everyday. Please call with status changes or questions.    GI Follow up: Clinic visit with Dr. Chacko in 4-6 weeks (we will arrange)     The patient was seen and discussed and plan agreed upon with GI staff, Dr. Dann Norwood PA-C  Advanced Endoscopy/Pancreaticobiliary GI Service  [facility]  Text Page  _______________________________________________________________  S: 24hr events and RN notes reviewed. Patient seen and evaluated at 1000 after drain removed. Patient states that he doesn't have any concerns today besides wanting a bedpan.    O:  Blood pressure 98/53, pulse 117, temperature 97.1  F (36.2  C), temperature source Oral, resp. rate 16, height 1.676 m (5' 6\"), weight 71.5 kg (157 lb 10.1 oz), SpO2 94 %.  Gen: Laying in bed. Extremely malnourished and cachectic appearing Appears comfortable  HEENT: NCAT. Sclera anicteric, poor dentition  Resp: non labored breathing  Abd: mild distention, non tender  Skin: no jaundice  Ext: warm, well perfused  Mental status/Psych: alert and oriented    LABS:  [facility]  Recent Labs   Lab 08/25/21  0637 08/24/21  0536 08/23/21  1733 " 08/23/21  1146 08/22/21  0540   * 132*  --  132* 131*   POTASSIUM 3.9 3.8  --  3.9 4.1   CHLORIDE 94 90*  --  90* 96   JANENE 8.7 8.3*  --  8.4* 8.7   CO2 26 33*  --  32 28   BUN 27 47*  --  36* 21   CR 2.14* 2.95*  --  2.49* 1.72*   * 132* 107* 102* 85     CBC  Recent Labs   Lab 08/25/21  0637 08/24/21  0536 08/23/21  1146 08/22/21  0540   WBC 12.5* 12.7* 14.1* 14.5*   RBC 2.78* 2.53* 2.72* 2.69*   HGB 8.2* 7.5* 8.0* 8.0*   HCT 26.4* 23.2* 25.5* 24.3*   MCV 95 92 94 90   MCH 29.5 29.6 29.4 29.7   MCHC 31.1* 32.3 31.4* 32.9   RDW 17.6* 17.3* 17.6* 17.8*    320 357 327     INR  Recent Labs   Lab 08/20/21  0610   INR 1.88*     LFTs  Recent Labs   Lab 08/25/21  0637 08/24/21  0536 08/23/21  1146 08/22/21  0540   ALKPHOS 102 96 97 101   AST 49* 39 46* 46*   ALT 20 17 19 20   BILITOTAL 1.6* 1.5* 1.6* 1.8*   PROTTOTAL 8.2 7.2 7.4 7.4   ALBUMIN 1.7* 1.5* 1.5* 1.6*      PANCNo lab results found in last 7 days.    IMAGING:  EXAMINATION: CT ABDOMEN PELVIS W CONTRAST, 8/23/2021 10:15 AM                                                                   IMPRESSION:   1. Sequela of necrotizing pancreatitis with stable peripancreatic  fluid collections. Unchanged position of left lateral approach large  bore drain and cystogastrostomy tube.  2. Stable large right pleural effusion with associated atelectasis.  3. Decreased size of hypoattenuating lesion of hepatic segment 2.   Attention on follow-up.  4. Stable loculated ascites.  5. Edematous and poorly enhancing kidneys similar to prior.

## 2021-08-25 NOTE — PROGRESS NOTES
Patient Name: Dax Villareal  Medical Record Number: [id number]  Today's Date: 8/25/2021    Procedure: Image Guided Left Retroperitoneal Drain Removal  Proceduralist: Dr. Diaz    Procedure Start: 1030  Procedure end: 1037  Sedation medications administered: N/A     Report given to: Israel CHENG RN  : N/A    Other Notes: Pt arrived to IR room 2 from . Consent reviewed. Pt denies any questions or concerns regarding procedure. Pt positioned Lateral Left side up and monitored per protocol. Pt tolerated procedure without any noted complications. Pt transferred back to .

## 2021-08-25 NOTE — PROVIDER NOTIFICATION
D/I: Pt is alert and oriented x4. Tachycardic and dyspneic on 3-4L oxygen via NC. Right Chest dialysis access intact. GJ tube patent with J--port running TF @ 50 mL/hr with solution + enzymes/NaBicarb Q4h. G-port to gravity with large output. Left upper abd drain dressing CDI. Skin is jaundiced and fragile. Pt repositions independently in bed. Pt  lowered right upper siderail.  P: Will continue to monitor pt and follow POC.

## 2021-08-25 NOTE — PROGRESS NOTES
[facility]  Progress Note - [facility] 3 Service        Date of Admission:  6/28/2021    Assessment & Plan   Dax Villareal is a 30 year old male with hx of alcohol use disorder admitted on 6/28/2021 after recent prolonged admission at [facility] in [place] who has severe acute alcoholic hepatitis c/b HE, ESRD requiring HD, and malnutrition in the setting of acute necrotizing pancreatitis, s/p PEG-J placement on 7/13. He arrived with acute respiratory failure and septic shock that have resolved, and his secondary bacterial peritonitis has been appropriately treated. He requires continued management for ESRD, hepatic encephalopathy, infected pancreatic pseudocyst, nutritional support via PEG-J, and persistent right pleural effusion. Clinically improving after multiple necrosectomies.     Today:  - IR removed left flank drain  - Discontinued antibiotics     Septic Shock  Necrotizing alcoholic pancreatitis w/ infected pseudocyst  Secondary Bacterial Peritonitis 2/2 infected pseudocyst with VRE  Admitted to the [facility] ICU on 6/28/2021 from [facility] in [place] for septic shock due to necrotizing pancreatitis. CT on admit showing mature collection with enhancing wall measuring ~[id number]. IR placed perc drain 6/29. Repeat imaging 7/18 revealed walled off necrotic collection amenable to endoscopic transluminal drainage with cystgastrostomy stenting with necrosectomy on 7/21/21. Complete necrosectomy on 8/11. His left flank drain was clamped 8/16 with a plan to remove on 8/24. Antibiotics will be discontinued once drain removed.     Abx:  Cefepime and metronidazole discontinued 8/25  (Linezolid 7/21-7/30, Meropenem 6/29-7/18, Ceftriaxone 7/21-7/24; 7/26-7/27 Zosyn 7/27-8/6)  BCx: NGTD since 5/18/2021     Gastric Outlet Obstruction  High G tube output due to the cystogastric connection/fluid drainage noted on 8/5. Per GI, possibly related to gastric outlet from severe abdominal  inflammation and is expected. KUB negative for obstructive gas pattern. Continues to have output, but most of it is what he is taking PO.      Right Pancreatic Hydrothorax, stable  Increased dyspnea with CXR on 7/19 with further imaging and thoracentesis confirming a hepatic/pancreatic hydrothorax. Thoracentesis PRN for dyspnea.     Alcoholic Hepatitis  Coagulation Defect  Received 1 week of steroids at [facility] for elevated INR and altered mental status. Not a liver transplant candidate. Mental status improved with aggressive lactulose and rifaxamin, which was discontinued without return of AMS.     Hepatic Fluid collection vs Abscess  CT Ab/pelvis from 8/13 and 8/21 showing a 3.0 x 2.7 cm hepatic fluid collection. IR consulted and recommended continue ABx without intervention until drain removed. CT imaging on 8/23 shows fluid collection reduced in size measuring 2 x 2.1cm.      ESRD due to ATN  Stage III RADHA due to ATN from septic shock without recovery for >3 months. Line is TDC from PTA. Nephrology dialyzed on Tu, Th, Sa schedule.  - Will need follow-up and outpatient dialysis     Anemia due to Chronic Illness  Hematochezia, resolved  Hemoperitoneum  Baseline hgb 17. Running between 6-8 in setting of chronic illness, new ESRD with epocrit per Nephrology, frequent lab draws and procedures. Hematochezia on 8/8 after starting standard intensity heparin, now resolved. Paracentesis on 8/21 with hemoperitoneum. No active bleeding.   - Goal hgb > 7.0, trend Hgb     Alcohol Use Disorder  Counseled patient on complete alcohol cessation. Not interested in additional services at this time. Continue daily folic acid and thiamine.     Lactic Acidosis  Intermittent Lactic acidosis in the setting of sepsis, ESRD, bleeding.     Hypokalemia  Noted in the setting of high gastric output and improved once G-tube output decreased. CTM.      Severe Malnutrition  Poor oral intake in the setting of necrotizing pancreatitis. PEG tube  placed 7/13. Dietitian consulted and continuous tube feeds with goal of 65 ml/hr with continuous pancreatic enzymes  - Continuous tube feeds decreased to 50 mL overnight.       Diet: Regular Diet Adult  Adult Formula Drip Feeding: Continuous Vital 1.5; Jejunostomy; Goal Rate: 50; mL/hr; Medication - Feeding Tube Flush Frequency: At least 15-30 mL water before and after medication administration and with tube clogging; Amount to Send (Nutrition us...    DVT Prophylaxis: Pneumatic Compression Devices  Rdz Catheter: Not present  Fluids: 10 mL NS TKO  Central Lines: PRESENT  PICC Triple Lumen 06/28/21 Left-Site Assessment: WDL  CVC Double Lumen Right-Site Assessment: WDL  Code Status: Full Code      Disposition Plan   Expected discharge: 3-4 days, recommended to transitional care unit or LTACH once dialysis home found and LTACH accepting patient.      Patient was seen by and discussed with attending physician, Dr. Gregory.     Qamar Jackson MD  Med/Peds, PGY-2  [phone]  [address]  Securely message with the Vocera Web Console (learn more here)  Text page via [facility] Paging/Directory  Please see sign in/sign out for up to date coverage information  _____________________________________________________________________    Interval History    Patient with significant gastric output - 2.4L in the past 24hrs, though likely representative of what he is drinking PO. Drain removed with IR this morning, tolerated well. Patient will discontinue antibiotics today.      Physical Exam   Vital Signs: Temp: 96.9  F (36.1  C) Temp src: Oral BP: 110/55 Pulse: (!) 124   Resp: 16 SpO2: 92 % O2 Device: Nasal cannula Oxygen Delivery: 4 LPM  Weight: 157 lbs 10.06 oz  Constitutional: Awake, non-diaphoretic, conversational, HD running  Cardiovascular: tachycardic, no new murmur noted, no edema  Respiratory: Tachypnic, decreased breath sounds on right which are unchanged, NC on for  comfort  Abdomen: Tolerates palpation with minimal pain, PEG-tube site clean  Musculoskeletal: Emaciated, moving all extremities on examination  Skin: no Jaundice    Data   Recent Labs   Lab 08/25/21  0637 08/24/21  0536 08/23/21  1733 08/23/21  1146 08/21/21  1136 08/20/21  1726 08/20/21  0610   WBC 12.5* 12.7*  --  14.1*  --    < > 13.7*   HGB 8.2* 7.5*  --  8.0*  --    < > 7.4*   MCV 95 92  --  94  --    < > 94    320  --  357  --    < > 337   INR  --   --   --   --   --   --  1.88*   * 132*  --  132*  --    < > 132*   POTASSIUM 3.9 3.8  --  3.9  --    < > 3.9   CHLORIDE 94 90*  --  90*  --    < > 100   CO2 26 33*  --  32  --    < > 25   BUN 27 47*  --  36*  --    < > 20   CR 2.14* 2.95*  --  2.49*  --    < > 1.62*   ANIONGAP 9 9  --  10  --    < > 7   JANENE 8.7 8.3*  --  8.4*  --    < > 7.9*   * 132* 107* 102*  --    < > 128*   ALBUMIN 1.7* 1.5*  --  1.5*   < >   < > 1.4*   PROTTOTAL 8.2 7.2  --  7.4   < >   < > 6.9   BILITOTAL 1.6* 1.5*  --  1.6*   < >   < > 1.5*   ALKPHOS 102 96  --  97   < >   < > 111   ALT 20 17  --  19   < >   < > 20   AST 49* 39  --  46*   < >   < > 40    < > = values in this interval not displayed.

## 2021-08-25 NOTE — PROGRESS NOTES
[facility] Nurse Inpatient Wound Assessment   Reason for consultation: Evaluate and treat  Peg tube wounds    Assessment  Peg tube wounds due to Moisture Associated Skin Damage (MASD)  Status: healing, now hypergranular tissue present    Treatment Plan  Peg tube wounds: Every other day   Cleanse wound bed with NS and pat dry.  Paint gera tube area with No sting film barrier  Cut a piece of Hydrofera blue (#005231) and split in the middle. Dampen it with NS and wring out excess  Place it under the tube. DO not add more dressing under the bumper, may add gauze on top of the bumper if needed      Orders Written  Recommended provider order: None, at this time  WOC Nurse follow-up plan:weekly  Nursing to notify the Provider(s) and re-consult the WOC Nurse if wound(s) deteriorates or new skin concern.    Patient History  According to provider note(s):  30 year old male with a history of heavy alcohol use who presented to OSH 5/19 for abdominal pain, nausea and vomiting, admitted for alcohol hepatitis, ESLD, RADHA on HD, septic shock requiring pressors as well as necrotizing pancreatitis with large evolving necrotic collection. Transferred to [facility] for consideration of drainage of presumed infected necrotic collection. S/p EUS-guided necrosectomy (7/21/21).    Objective Data  Containment of urine/stool: Continent of bladder and Continent of bowel    Active Diet Order  Orders Placed This Encounter      Regular Diet Adult      Output:   I/O last 3 completed shifts:  In: 1655 [NG/GT:505]  Out: 5550 [Urine:50; Emesis/NG output:3500; Other:2000]    Risk Assessment:   Sensory Perception: 3-->slightly limited  Moisture: 3-->occasionally moist  Activity: 2-->chairfast  Mobility: 3-->slightly limited  Nutrition: 3-->adequate  Friction and Shear: 2-->potential problem  Tyrel Score: 16                          Labs:   Recent Labs   Lab 08/25/21  0637 08/20/21  1949 08/20/21  0610   ALBUMIN 1.7*   < > 1.4*   HGB 8.2*  --  7.4*   INR  --   --   1.88*   WBC 12.5*  --  13.7*    < > = values in this interval not displayed.       Physical Exam  Areas of skin assessed: focused peg tube site    Wound Location:  Peg tube       8/19 8/25      Date of last photo 8/19  Wound History:  7/13 - PEG-J placement with T-tag gastropexy    Wound Base: 100 % pale moist red tissue at 9 O'clock- hypergranular tissue     Palpation of the wound bed: normal      Drainage: small     Description of drainage: cloudy and tan     Measurements (length x width x depth, in cm) 0.5  x 0.8  x  0.1 cm   Tunneling N/A     Undermining N/A  Periwound skin: erythema- blanchable and resolving      Color: pink      Temperature: normal   Odor: none  Pain: moderate and with assessment,   Pain intervention prior to dressing change: NA    Interventions  Visual inspection and assessment completed   Wound Care Rationale Protect periwound skin, Promote moist wound healing without tissue dehydration  and Decrease bacterial load  Wound Care: done per plan of care  Supplies: ordered: Hydrofera blue  Current off-loading measures: Pillows  Current support surface: Standard  Low air loss mattress  Education provided to: plan of care and wound progress  Discussed plan of care with Patient and Nurse    Deneen Matthews RN CWOCN

## 2021-08-26 LAB
ALBUMIN SERPL-MCNC: 1.7 G/DL (ref 3.4–5)
ALP SERPL-CCNC: 108 U/L (ref 40–150)
ALT SERPL W P-5'-P-CCNC: 23 U/L (ref 0–70)
ANION GAP SERPL CALCULATED.3IONS-SCNC: 11 MMOL/L (ref 3–14)
AST SERPL W P-5'-P-CCNC: 48 U/L (ref 0–45)
BILIRUB SERPL-MCNC: 1.6 MG/DL (ref 0.2–1.3)
BUN SERPL-MCNC: 42 MG/DL (ref 7–30)
CALCIUM SERPL-MCNC: 8.8 MG/DL (ref 8.5–10.1)
CHLORIDE BLD-SCNC: 92 MMOL/L (ref 94–109)
CO2 SERPL-SCNC: 26 MMOL/L (ref 20–32)
CREAT SERPL-MCNC: 2.83 MG/DL (ref 0.66–1.25)
ERYTHROCYTE [DISTWIDTH] IN BLOOD BY AUTOMATED COUNT: 17.2 % (ref 10–15)
GFR SERPL CREATININE-BSD FRML MDRD: 28 ML/MIN/1.73M2
GLUCOSE BLD-MCNC: 130 MG/DL (ref 70–99)
GLUCOSE BLDC GLUCOMTR-MCNC: 120 MG/DL (ref 70–99)
HCT VFR BLD AUTO: 27.5 % (ref 40–53)
HGB BLD-MCNC: 8.6 G/DL (ref 13.3–17.7)
MAGNESIUM SERPL-MCNC: 2.4 MG/DL (ref 1.6–2.3)
MCH RBC QN AUTO: 29.9 PG (ref 26.5–33)
MCHC RBC AUTO-ENTMCNC: 31.3 G/DL (ref 31.5–36.5)
MCV RBC AUTO: 96 FL (ref 78–100)
PHOSPHATE SERPL-MCNC: 8.4 MG/DL (ref 2.5–4.5)
PLATELET # BLD AUTO: 365 10E3/UL (ref 150–450)
POTASSIUM BLD-SCNC: 3.9 MMOL/L (ref 3.4–5.3)
PROT SERPL-MCNC: 8.7 G/DL (ref 6.8–8.8)
RBC # BLD AUTO: 2.88 10E6/UL (ref 4.4–5.9)
SODIUM SERPL-SCNC: 129 MMOL/L (ref 133–144)
WBC # BLD AUTO: 11.9 10E3/UL (ref 4–11)

## 2021-08-26 PROCEDURE — 999N000044 HC STATISTIC CVC DRESSING CHANGE

## 2021-08-26 PROCEDURE — 99231 SBSQ HOSP IP/OBS SF/LOW 25: CPT | Mod: GC | Performed by: INTERNAL MEDICINE

## 2021-08-26 PROCEDURE — 250N000013 HC RX MED GY IP 250 OP 250 PS 637: Performed by: INTERNAL MEDICINE

## 2021-08-26 PROCEDURE — C9113 INJ PANTOPRAZOLE SODIUM, VIA: HCPCS | Performed by: INTERNAL MEDICINE

## 2021-08-26 PROCEDURE — 99233 SBSQ HOSP IP/OBS HIGH 50: CPT | Performed by: STUDENT IN AN ORGANIZED HEALTH CARE EDUCATION/TRAINING PROGRAM

## 2021-08-26 PROCEDURE — 258N000003 HC RX IP 258 OP 636: Performed by: CLINICAL NURSE SPECIALIST

## 2021-08-26 PROCEDURE — 634N000001 HC RX 634: Performed by: CLINICAL NURSE SPECIALIST

## 2021-08-26 PROCEDURE — 250N000011 HC RX IP 250 OP 636: Performed by: INTERNAL MEDICINE

## 2021-08-26 PROCEDURE — 36592 COLLECT BLOOD FROM PICC: CPT | Performed by: INTERNAL MEDICINE

## 2021-08-26 PROCEDURE — 250N000013 HC RX MED GY IP 250 OP 250 PS 637: Performed by: STUDENT IN AN ORGANIZED HEALTH CARE EDUCATION/TRAINING PROGRAM

## 2021-08-26 PROCEDURE — 84100 ASSAY OF PHOSPHORUS: CPT | Performed by: INTERNAL MEDICINE

## 2021-08-26 PROCEDURE — 120N000002 HC R&B MED SURG/OB UMMC

## 2021-08-26 PROCEDURE — 85014 HEMATOCRIT: CPT | Performed by: INTERNAL MEDICINE

## 2021-08-26 PROCEDURE — 83735 ASSAY OF MAGNESIUM: CPT | Performed by: INTERNAL MEDICINE

## 2021-08-26 PROCEDURE — 250N000011 HC RX IP 250 OP 636: Performed by: CLINICAL NURSE SPECIALIST

## 2021-08-26 PROCEDURE — 90937 HEMODIALYSIS REPEATED EVAL: CPT

## 2021-08-26 PROCEDURE — P9047 ALBUMIN (HUMAN), 25%, 50ML: HCPCS | Performed by: CLINICAL NURSE SPECIALIST

## 2021-08-26 PROCEDURE — 80053 COMPREHEN METABOLIC PANEL: CPT | Performed by: INTERNAL MEDICINE

## 2021-08-26 RX ORDER — HEPARIN SODIUM 1000 [USP'U]/ML
500 INJECTION, SOLUTION INTRAVENOUS; SUBCUTANEOUS CONTINUOUS
Status: DISCONTINUED | OUTPATIENT
Start: 2021-08-26 | End: 2021-08-26

## 2021-08-26 RX ORDER — ALBUMIN (HUMAN) 12.5 G/50ML
25 SOLUTION INTRAVENOUS ONCE
Status: COMPLETED | OUTPATIENT
Start: 2021-08-26 | End: 2021-08-26

## 2021-08-26 RX ORDER — HEPARIN SODIUM 1000 [USP'U]/ML
500 INJECTION, SOLUTION INTRAVENOUS; SUBCUTANEOUS
Status: DISCONTINUED | OUTPATIENT
Start: 2021-08-26 | End: 2021-08-26

## 2021-08-26 RX ADMIN — HYDROXYZINE HYDROCHLORIDE 50 MG: 25 TABLET, FILM COATED ORAL at 20:45

## 2021-08-26 RX ADMIN — PANCRELIPASE 2 CAPSULE: 24000; 76000; 120000 CAPSULE, DELAYED RELEASE PELLETS ORAL at 03:29

## 2021-08-26 RX ADMIN — HYDROMORPHONE HYDROCHLORIDE 1 MG: 1 SOLUTION ORAL at 21:53

## 2021-08-26 RX ADMIN — ACETAMINOPHEN 325 MG: 325 SOLUTION ORAL at 20:45

## 2021-08-26 RX ADMIN — Medication 5 ML: at 17:04

## 2021-08-26 RX ADMIN — HYDROMORPHONE HYDROCHLORIDE 1 MG: 1 SOLUTION ORAL at 04:35

## 2021-08-26 RX ADMIN — PANTOPRAZOLE SODIUM 40 MG: 40 INJECTION, POWDER, FOR SOLUTION INTRAVENOUS at 08:49

## 2021-08-26 RX ADMIN — SODIUM BICARBONATE 325 MG: 325 TABLET ORAL at 11:07

## 2021-08-26 RX ADMIN — SODIUM BICARBONATE 325 MG: 325 TABLET ORAL at 15:07

## 2021-08-26 RX ADMIN — PANCRELIPASE 2 CAPSULE: 24000; 76000; 120000 CAPSULE, DELAYED RELEASE PELLETS ORAL at 22:17

## 2021-08-26 RX ADMIN — SODIUM BICARBONATE 325 MG: 325 TABLET ORAL at 03:29

## 2021-08-26 RX ADMIN — PANCRELIPASE 2 CAPSULE: 24000; 76000; 120000 CAPSULE, DELAYED RELEASE PELLETS ORAL at 00:11

## 2021-08-26 RX ADMIN — THIAMINE HCL TAB 100 MG 100 MG: 100 TAB at 08:49

## 2021-08-26 RX ADMIN — ONDANSETRON 4 MG: 2 INJECTION INTRAMUSCULAR; INTRAVENOUS at 17:14

## 2021-08-26 RX ADMIN — OXYCODONE HYDROCHLORIDE 5 MG: 5 SOLUTION ORAL at 23:19

## 2021-08-26 RX ADMIN — PANCRELIPASE 2 CAPSULE: 24000; 76000; 120000 CAPSULE, DELAYED RELEASE PELLETS ORAL at 08:49

## 2021-08-26 RX ADMIN — FOLIC ACID 1 MG: 1 TABLET ORAL at 08:49

## 2021-08-26 RX ADMIN — PANCRELIPASE 1 CAPSULE: 24000; 76000; 120000 CAPSULE, DELAYED RELEASE PELLETS ORAL at 11:07

## 2021-08-26 RX ADMIN — HYDROMORPHONE HYDROCHLORIDE 1 MG: 1 SOLUTION ORAL at 08:57

## 2021-08-26 RX ADMIN — Medication 1 PACKET: at 08:50

## 2021-08-26 RX ADMIN — Medication 1 PACKET: at 15:09

## 2021-08-26 RX ADMIN — PANCRELIPASE 2 CAPSULE: 24000; 76000; 120000 CAPSULE, DELAYED RELEASE PELLETS ORAL at 15:08

## 2021-08-26 RX ADMIN — MELATONIN TAB 3 MG 6 MG: 3 TAB at 22:16

## 2021-08-26 RX ADMIN — SODIUM CHLORIDE 250 ML: 9 INJECTION, SOLUTION INTRAVENOUS at 11:07

## 2021-08-26 RX ADMIN — Medication 1 PACKET: at 20:44

## 2021-08-26 RX ADMIN — EPOETIN ALFA-EPBX 5000 UNITS: 10000 INJECTION, SOLUTION INTRAVENOUS; SUBCUTANEOUS at 11:32

## 2021-08-26 RX ADMIN — SODIUM BICARBONATE 325 MG: 325 TABLET ORAL at 00:11

## 2021-08-26 RX ADMIN — SODIUM BICARBONATE 325 MG: 325 TABLET ORAL at 22:16

## 2021-08-26 RX ADMIN — HYDROMORPHONE HYDROCHLORIDE 1 MG: 1 SOLUTION ORAL at 00:12

## 2021-08-26 RX ADMIN — OXYCODONE HYDROCHLORIDE 5 MG: 5 SOLUTION ORAL at 15:05

## 2021-08-26 RX ADMIN — ALBUMIN HUMAN 25 G: 0.25 SOLUTION INTRAVENOUS at 11:31

## 2021-08-26 RX ADMIN — HYDROXYZINE HYDROCHLORIDE 50 MG: 25 TABLET, FILM COATED ORAL at 15:05

## 2021-08-26 RX ADMIN — HYDROXYZINE HYDROCHLORIDE 50 MG: 25 TABLET, FILM COATED ORAL at 08:57

## 2021-08-26 RX ADMIN — SODIUM CHLORIDE 300 ML: 9 INJECTION, SOLUTION INTRAVENOUS at 11:06

## 2021-08-26 RX ADMIN — Medication 5 ML: at 08:49

## 2021-08-26 RX ADMIN — OXYCODONE HYDROCHLORIDE 5 MG: 5 SOLUTION ORAL at 03:30

## 2021-08-26 RX ADMIN — SODIUM BICARBONATE 325 MG: 325 TABLET ORAL at 08:49

## 2021-08-26 RX ADMIN — HYDROMORPHONE HYDROCHLORIDE 1 MG: 1 SOLUTION ORAL at 17:25

## 2021-08-26 ASSESSMENT — ACTIVITIES OF DAILY LIVING (ADL)
ADLS_ACUITY_SCORE: 17
ADLS_ACUITY_SCORE: 18

## 2021-08-26 ASSESSMENT — MIFFLIN-ST. JEOR: SCORE: 1600.75

## 2021-08-26 NOTE — PROGRESS NOTES
"    [facility] [facility]         At the request of Marisol Moya RN CC, I reviewed the chart of Dax Villareal for possible  admission to [facility]. The pt does not meet clinical criteria for admission to [place].       In addition his insurance ([employer]) is out of network for [place] and does not offer an option for in-network exceptions. Per review by the [place] financial counselor specialist, \"As of 8/1/21, this patient has [employer] coverage, which we are out of network for\".  Additionally,  \"SNF/TCU outside of [facility] and the surrounding area would be considered out of network.\"      Fatimah Aiken RN  LTACH Referral Specialist  [address] [address]  [email or web address]    [email or web address]  Office: [phone]  Fax: [phone]     CONFIDENTIAL Protected under [place] Statute  145.61 et seq    "

## 2021-08-26 NOTE — PROGRESS NOTES
"[id number] progress note. Care from: 07:00 - 19:00     /62   Pulse 116   Temp (!) 96.3  F (35.7  C) (Oral)   Resp 29   Ht 1.676 m (5' 6\")   Wt 70.3 kg (154 lb 15.7 oz)   SpO2 90%   BMI 25.01 kg/m       /43 (BP Location: Left arm)   Pulse (!) 123   Temp 98.1  F (36.7  C) (Oral)   Resp 19   Ht 1.676 m (5' 6\")   Wt 70.3 kg (154 lb 15.7 oz)   SpO2 (!) 88%   BMI 25.01 kg/m          Vitals: VSS     Neuro: WDL   o Pain: Continued pain, well-controlled with PRN dilaudid and scheduled oxycodone   o Mood/Behavior: Calm and cooperative     Activity: Resting in bed. Pt requires 2 assist/lift for mobility out of bed. Following with PT/OT     Cardiovascular: WDL     Respiratory: SpO2 stable on 4 LPM O2 via NC     GI & Nutrition: Abdomen slightly distended. GJ tube site intact. G is vented to drainage and J has continuous enteral feedings with bicarb + creon.   o Nausea: Intermittent nausea with emesis.   o Bowel Movement: Diarrhea today     : Anuric, on hemodialysis. Dialysis today at 10:45 for a 4 hr run     Skin, Wounds & LDAs: Skin largely intact. PEG tube site CDI. Dressing over old drain on L flank CDI. HD line CDI. PICC line functioning, dressing CDI.     Notable Labs: Consistent with ESRD     Events & Plan Updates: Pt went to dialysis at 10:45, came back to unit around 15:00, resting after that. Per notes, pursuing discharge to TCU   "

## 2021-08-26 NOTE — PROVIDER NOTIFICATION
D/I: Pt is alert and oriented x4. Tachycardic and dyspneic on 3-4L oxygen via NC. Right Chest dialysis access intact. GJ tube patent with J--port running TF @ 50 mL/hr with solution + enzymes/NaBicarb Q4h. G-port to gravity with large output. Scheduled oxycodone given for pain and Atarax at bedtime for anxiety. Left upper abd drain dressing CDI. Skin is jaundiced and fragile. Pt repositions independently in bed. Pt  lowered right upper siderail.  P: Will continue to monitor pt and follow POC.

## 2021-08-26 NOTE — PLAN OF CARE
[id number]    Neuro: A&Ox4; sclera yellow; drop foot noted  GI: PEG tube with G tube to gravity and bag; J tube to continuous feeding at goal 50mL/hr with 60mL flushes q4hr.  Tolerating well. Denies n/v.  Encouraged head elevation while on TF.  : on HD; to go today  Resp: VSS on 4 L NC  Mobility: x2 with lift out of bed; x1 assist repositioning  Cardiac: tachycardia noted   Skin: pale; blanchable redness on bottom; peeling skin on bottom of feet bilaterally  Lines/Drains: L triple lumen PICC; HD line on R chest.  G/J patent.  Peritoneal drain removed with dressing CDI  Pain: states abdominal pain radiating to back and oral pain d/t dry mouth.  PRN dilaudid given x2 with decrease in pain.  Scheduled oxy also given x1;  Mouth swabs encouraged.  Behavior: calm; cooperative; awake most of shift    Plan of Care: HD this AM? Continue cares and assessments per provider instruction; monitor for changes.

## 2021-08-26 NOTE — PLAN OF CARE
Reason for Admission: Severe malnutrition    Status: Unchanged    RN assumed cares at 0700    Vitals: WDL  Pain: Pain in the abdomen and back  Neuro: Flat affect,   Cardiac: Murmur detected. Tachycardia  Respiratory: Maintaining saturations with 5L nasal canula.  Some dyspnea on exertion.   GI/: Some small loose stools throughout the shift, able to use bedpan.  Anuric  IV/Drains: Triple lumen PICC in the left arm, two lumens TKO, the third is heparin locked.  All caps changed today 8/25.  PEG tube with J to tube feeds and G to gravity. Large amount of drainage from gastric tube this shift.   Activity: Lift assist.   Skin: Some blanchable redness on bottom, jaundice, pale and dry.   Labs: WDL, ex sodium 129    Plan of Care:  RN assumed cares at 0700, Pt alert and oriented, illogical at times.  VS stable throughout the shift with intermittent tachycardia.  Peritoneal drain removed in IR this AM.  Continues with PEG tube with TF at goal rate of 50 with bicarb and creon; gastric tube to gravity with large amount of drainage this shift.  Pt appetite poor, encouraging intake.  Plan for dialysis tomorrow.  No acute incidents this shift.  Continue to monitor and notify MD of any changes.

## 2021-08-26 NOTE — PROGRESS NOTES
Nephrology Progress Note  08/26/2021         Dax Villareal is a 30 year old male with h/o ETOH hepatitis, end stage liver disease, RADHA on HD, transferred from [facility] in [place] for septic shock on 6/28/21 for treatment of necrotizing pancreatitis and decompensated liver disease. He was initially admitted to [facility] 5/19/21 and started on RRT 5/26/2021.        Interval History :   Mr Villareal had necrosectomy for 4th time on 8/11 with no more anticipated.  Running HD on TTS schedule, has had large NG output the past week so not needing to pull much UF on runs, only 1L UF today.  Stable enough to do outpt HD, has been on HD x3 months, now ESRD.        Assessment & Recommendations:   ESRD-Cr was 0.8 as recently as 5/19/2021 but now HD dependent for 3 months, started RRT at [facility] prior to transfer to [facility] on 5/26.  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  continuing restorative cares for now, BP's have been stable enough to support HD.  Had GI stenting of pancreas x4.                 -Line is TDC from PTA               - HD on TTS schedule unless issues arise requiring change in plan.      Volume status-Anuric, net negative on his own with drain output so needing to give volume on run today.  No volume overload on exam, EDW is 70kg.        Electrolytes/pH-K 3.9, bicarb 26, no acute issues.  Na 129,      Ca/phos/pth-Ca 8.8, corrects to normal with low albumin.  Mg and Phos reasonable.       Anemia-Hgb low at 8.6, acute management per team, last PRBC's 7/14.  Started on EPO 5k with runs, iron sats 43%      Nutrition-Taking PO, appetite reasonable.       Seen and discussed with Dr Ryan      Recommendations were communicated to primary team via note       SOFIA Marlow CNS  Clinical Nurse Specialist  [phone]    Review of Systems:   I reviewed the following systems:  Gen: No fevers or chills  CV: No CP at rest  Resp: No  "SOB at rest  GI: No N/V    Physical Exam:   I/O last 3 completed shifts:  In: 450   Out: 2550 [Emesis/NG output:1550; Other:1000]   /43 (BP Location: Left arm)   Pulse (!) 123   Temp 98.1  F (36.7  C) (Oral)   Resp 19   Ht 1.676 m (5' 6\")   Wt 70.3 kg (154 lb 15.7 oz)   SpO2 (!) 88%   BMI 25.01 kg/m       GENERAL APPEARANCE: Lying in bed, in no distress.    EYES:  scleral icterus, pupils equal  Pulmonary: decreased BS   CV: tachy   - Edema - no LE edema  GI: mild distention, non-tender. Has pancreatic drain  MS: no evidence of inflammation in joints, no muscle tenderness  SKIN: no rash, warm, dry, no cyanosis  NEURO: alert/interactive  ACCESS: Right TDC    Labs:   All labs reviewed by me  Electrolytes/Renal - Recent Labs   Lab Test 08/26/21  1419 08/26/21  0657 08/25/21  0637 08/24/21  0536   NA  --  129* 129* 132*   POTASSIUM  --  3.9 3.9 3.8   CHLORIDE  --  92* 94 90*   CO2  --  26 26 33*   BUN  --  42* 27 47*   CR  --  2.83* 2.14* 2.95*   * 130* 131* 132*   JANENE  --  8.8 8.7 8.3*   MAG  --  2.4* 1.9 2.2   PHOS  --  8.4* 6.3* 8.6*       CBC -   Recent Labs   Lab Test 08/26/21  0657 08/25/21  0637 08/24/21  0536   WBC 11.9* 12.5* 12.7*   HGB 8.6* 8.2* 7.5*    332 320       LFTs -   Recent Labs   Lab Test 08/26/21  0657 08/25/21  0637 08/24/21  0536   ALKPHOS 108 102 96   BILITOTAL 1.6* 1.6* 1.5*   ALT 23 20 17   AST 48* 49* 39   PROTTOTAL 8.7 8.2 7.2   ALBUMIN 1.7* 1.7* 1.5*       Iron Panel -   Recent Labs   Lab Test 08/14/21 2055 07/19/21  0632   IRON 28* 31*   IRONSAT 21 43   JERRELL 2,186*  --            Current Medications:    sodium bicarbonate  325 mg Per Feeding Tube Q4H    And     amylase-lipase-protease  1-2 capsule Per Feeding Tube Q4H     amylase-lipase-protease  2 capsule Oral TID w/meals     B and C vitamin Complex with folic acid  5 mL Per Feeding Tube Daily     banatrol plus  1 packet Per Feeding Tube TID     folic acid  1 mg Oral Daily     heparin lock flush  5-20 mL " Intracatheter Q24H     melatonin  6 mg Oral At Bedtime     menthol   Transdermal Q8H     oxyCODONE  5 mg Oral Q8H     pantoprazole (PROTONIX) IV  40 mg Intravenous Daily with breakfast     protein modular  1 packet Per Feeding Tube BID     sodium chloride (PF)  3 mL Intracatheter Q8H     sodium chloride (PF)  73 mL Intravenous Once     sodium chloride (PF)  75 mL Intravenous Once     sodium chloride (PF)  86 mL Intravenous Once     vitamin B1  100 mg Oral Daily    Or     thiamine  100 mg Intravenous Daily       - MEDICATION INSTRUCTIONS -       dextrose       heparin (porcine) 1,000 Units/hr (08/24/21 0907)

## 2021-08-26 NOTE — PROGRESS NOTES
Care Management Follow Up    Length of Stay (days): 59    Expected Discharge Date: 9/2/21     Concerns to be Addressed: discharge planning     Patient plan of care discussed at interdisciplinary rounds: Yes    Anticipated Discharge Disposition: Skilled Nursing Facilty, Transitional Care     Anticipated Discharge Services: New outpatient dialysis   Anticipated Discharge DME: None    Patient/family educated on Medicare website which has current facility and service quality ratings: no (Pt requesting TCU in or near [place])  Education Provided on the Discharge Plan:    Patient/Family in Agreement with the Plan: yes    Referrals Placed by CM/SW: None at this time.    Private pay costs discussed: Not applicable    Additional Information:  SW following for discharge needs. Received update from [facility] that per [facility] LT[facility] liaison, pt does not meet LTACH criteria for medical complexity and his insurance is OON. Also noted that PT/OT are recommending TCU for pt. SW met with pt in his room, introduced self and role. Pt stated that he will be moving to [place], [place] to an apartment and would like a TCU in or near [place]. He would also prefer OP dialysis in this location (there is a [facility] clinic located in [place]). Pt noted that he ordered an electric wheelchair that should be arriving soon. SW discussed pt getting on a CADI waiver post-TCU discharge, as pt reports he currently does not have any  through the [facility], pt was agreeable to this.     SW will send TCU and OP HD referrals tomorrow, 8/27. Per provider, pt is nearing medical readiness for discharge.     RONNA Delaney, [facility]  [address]   [facility]  Phone: [phone]  Pager: [phone]

## 2021-08-26 NOTE — PROGRESS NOTES
Care Management Follow Up    Length of Stay (days): 59    Expected Discharge Date: 09/30/2021     Concerns to be Addressed:  Discharge planning  Patient plan of care discussed at interdisciplinary rounds: Yes    Anticipated Discharge Disposition:  TCU     Anticipated Discharge Services:  TCU  Anticipated Discharge DME:  no    Patient/family educated on Medicare website which has current facility and service quality ratings:  NA  Education Provided on the Discharge Plan:  yes  Patient/Family in Agreement with the Plan:  yes    Referrals Placed by CM/SW:  None at this time  Private pay costs discussed: Not applicable    Additional Information:  LTACH will not accept patient due to not meeting criteria to take. Outside of that, [facility] Liaison stated that patient has a commercial insurance that will only cover him in the [facility] and surrounding areas. He does have secondary insurance, but unsure how it works with the primary insurance. Writer talked to MD about issue and MD stated that patient could be appropriate for TCU pending insurance. Writer contacted patient SW and explained issues regarding placement. Patient will also need dialysis T,TH,S as an outpatient. SW will start looking into possible TCU placement. RNCC will continue to check in for needs and assist if needed.     Kelly Traylor  RN Care Coordinator   MICU/SICU  [phone]           Kelly Traylor, RN

## 2021-08-26 NOTE — PROGRESS NOTES
[facility]  Progress Note - [facility] 3 Service        Date of Admission:  6/28/2021    Assessment & Plan   Dax Villareal is a 30 year old male with hx of alcohol use disorder admitted on 6/28/2021 after recent prolonged admission at [facility] in [place] who has severe acute alcoholic hepatitis c/b HE, ESRD requiring HD, and malnutrition in the setting of acute necrotizing pancreatitis, s/p PEG-J placement on 7/13. He arrived with acute respiratory failure and septic shock that have resolved, and his secondary bacterial peritonitis has been appropriately treated. He requires continued management for ESRD, hepatic encephalopathy, infected pancreatic pseudocyst, nutritional support via PEG-J, and persistent right pleural effusion. Clinically improving after multiple necrosectomies.     Today:  - Working closely with CM and SW concerning placement; [facility]     Septic Shock  Necrotizing alcoholic pancreatitis w/ infected pseudocyst  Secondary Bacterial Peritonitis 2/2 infected pseudocyst with VRE  Admitted to [facility] ICU on 6/28/2021 from [facility] in [place] for septic shock due to necrotizing pancreatitis. CT on admit showing mature collection with enhancing wall measuring ~[id number]. IR placed perc drain 6/29. Repeat imaging 7/18 revealed walled off necrotic collection amenable to endoscopic transluminal drainage with cystgastrostomy stenting with necrosectomy on 7/21/21. Complete necrosectomy on 8/11. His left flank drain was clamped 8/16 with a plan to remove on 8/24. Antibiotics will be discontinued once drain removed.     Abx:  Cefepime and metronidazole discontinued 8/25  (Linezolid 7/21-7/30, Meropenem 6/29-7/18, Ceftriaxone 7/21-7/24; 7/26-7/27 Zosyn 7/27-8/6)  BCx: NGTD since 5/18/2021     Gastric Outlet Obstruction  High G tube output due to the cystogastric connection/fluid drainage noted on 8/5. Per GI,  possibly related to gastric outlet from severe abdominal inflammation and is expected. KUB negative for obstructive gas pattern. Continues to have output, but most of it is what he is taking PO.      Right Pancreatic Hydrothorax, stable  Increased dyspnea with CXR on 7/19 with further imaging and thoracentesis confirming a hepatic/pancreatic hydrothorax. Thoracentesis PRN for dyspnea.     Alcoholic Hepatitis  Coagulation Defect  Received 1 week of steroids at [facility] for elevated INR and altered mental status. Not a liver transplant candidate. Mental status improved with aggressive lactulose and rifaxamin, which was discontinued without return of AMS.     Hepatic Fluid collection vs Abscess  CT Ab/pelvis from 8/13 and 8/21 showing a 3.0 x 2.7 cm hepatic fluid collection. IR consulted and recommended continue ABx without intervention until drain removed. CT imaging on 8/23 shows fluid collection reduced in size measuring 2 x 2.1cm.      ESRD due to ATN  Stage III RADHA due to ATN from septic shock without recovery for >3 months. Line is TDC from PTA. Nephrology dialyzed on Tu, Th, Sa schedule.  - Will need follow-up and outpatient dialysis     Anemia due to Chronic Illness  Hematochezia, resolved  Hemoperitoneum  Baseline hgb 17. Running between 6-8 in setting of chronic illness, new ESRD with epocrit per Nephrology, frequent lab draws and procedures. Hematochezia on 8/8 after starting standard intensity heparin, now resolved. Paracentesis on 8/21 with hemoperitoneum. No active bleeding.   - Goal hgb > 7.0, trend Hgb     Alcohol Use Disorder  Counseled patient on complete alcohol cessation. Not interested in additional services at this time. Continue daily folic acid and thiamine.     Lactic Acidosis  Intermittent Lactic acidosis in the setting of sepsis, ESRD, bleeding.     Hypokalemia  Noted in the setting of high gastric output and improved once G-tube output decreased. CTM.      Severe Malnutrition  Poor oral  intake in the setting of necrotizing pancreatitis. PEG tube placed 7/13. Dietitian consulted and continuous tube feeds with goal of 65 ml/hr with continuous pancreatic enzymes  - Continuous tube feeds decreased to 50 mL overnight.       Diet: Regular Diet Adult  Adult Formula Drip Feeding: Continuous Vital 1.5; Jejunostomy; Goal Rate: 50; mL/hr; Medication - Feeding Tube Flush Frequency: At least 15-30 mL water before and after medication administration and with tube clogging; Amount to Send (Nutrition us...    DVT Prophylaxis: Pneumatic Compression Devices  Rdz Catheter: Not present  Fluids: 10 mL NS TKO  Central Lines: PRESENT  PICC Triple Lumen 06/28/21 Left-Site Assessment: WDL  CVC Double Lumen Right-Site Assessment: WDL  Code Status: Full Code      Disposition Plan   Expected discharge: 3-4 days, recommended to transitional care unit or LTACH once dialysis home found and LTACH accepting patient.      Patient was seen by and discussed with attending physician, Dr. Gregory.     Qamar Jackson MD  Med/Peds, PGY-2  [phone]  [address]  Securely message with the Vocera Web Console (learn more here)  Text page via [facility] Paging/Directory  Please see sign in/sign out for up to date coverage information  _____________________________________________________________________    Interval History    Continues to have increasing NG output, though no increase in tachycardia. Likely what he is drinking PO. Pain improving after drain removal, doing well. Working with CM and SW for placement.     Physical Exam   Vital Signs: Temp: 98.1  F (36.7  C) Temp src: Oral BP: 108/43 Pulse: (!) 123   Resp: 19 SpO2: (!) 88 % O2 Device: Nasal cannula Oxygen Delivery: 4 LPM  Weight: 154 lbs 15.73 oz  Constitutional: Awake, non-diaphoretic, conversational, lying in bed  Cardiovascular: tachycardic, no new murmur noted, no edema  Respiratory: Tachypnic, decreased breath sounds on  right which are unchanged, NC on for comfort  Abdomen: Tolerates palpation with minimal pain, PEG-tube site clean  Musculoskeletal: Emaciated, moving all extremities on examination  Skin: no Jaundice    Data   Recent Labs   Lab 08/26/21  1419 08/26/21  0657 08/25/21  0637 08/24/21  0536 08/20/21  1726 08/20/21  0610   WBC  --  11.9* 12.5* 12.7*   < > 13.7*   HGB  --  8.6* 8.2* 7.5*   < > 7.4*   MCV  --  96 95 92   < > 94   PLT  --  365 332 320   < > 337   INR  --   --   --   --   --  1.88*   NA  --  129* 129* 132*   < > 132*   POTASSIUM  --  3.9 3.9 3.8   < > 3.9   CHLORIDE  --  92* 94 90*   < > 100   CO2  --  26 26 33*   < > 25   BUN  --  42* 27 47*   < > 20   CR  --  2.83* 2.14* 2.95*   < > 1.62*   ANIONGAP  --  11 9 9   < > 7   JANENE  --  8.8 8.7 8.3*   < > 7.9*   * 130* 131* 132*   < > 128*   ALBUMIN  --  1.7* 1.7* 1.5*   < > 1.4*   PROTTOTAL  --  8.7 8.2 7.2   < > 6.9   BILITOTAL  --  1.6* 1.6* 1.5*   < > 1.5*   ALKPHOS  --  108 102 96   < > 111   ALT  --  23 20 17   < > 20   AST  --  48* 49* 39   < > 40    < > = values in this interval not displayed.

## 2021-08-26 NOTE — PROGRESS NOTES
HEMODIALYSIS TREATMENT NOTE    Date: 8/26/2021  Time: 2:41 PM    Data:  Pre Wt: 70.3 kg (154 lb 15.7 oz)   Desired Wt: 67.3 kg   Post Wt: 69.3 kg (152 lb 12.5 oz)  Weight change: 1 kg  Ultrafiltration - Post Run Net Total Removed (mL): 1000 mL  Vascular Access Status: patent  Dialyzer Rinse: Clear  Total Blood Volume Processed: 69.03 L Liters  Total Dialysis (Treatment) Time: 3.5 Hours    Lab:   no    Interventions/Assessment:  HD via cvc, 3k 2.25ca, 3.5 hr run, 2-3kg ufg, 350bfr/600dfr. Patient tolerated run however bp was soft with the start of the run, MD ordered albumin 25% 100ml. bp improved, allowed for 1kg to be removed. Patient completed run without issue, handoff report given to PCN.       Plan:    As per renal

## 2021-08-27 LAB
ALBUMIN SERPL-MCNC: 2.1 G/DL (ref 3.4–5)
ALP SERPL-CCNC: 104 U/L (ref 40–150)
ALT SERPL W P-5'-P-CCNC: 24 U/L (ref 0–70)
ANION GAP SERPL CALCULATED.3IONS-SCNC: 10 MMOL/L (ref 3–14)
ANION GAP SERPL CALCULATED.3IONS-SCNC: 9 MMOL/L (ref 3–14)
AST SERPL W P-5'-P-CCNC: 56 U/L (ref 0–45)
BACTERIA FLD CULT: NO GROWTH
BACTERIA FLD CULT: NORMAL
BILIRUB SERPL-MCNC: 1.5 MG/DL (ref 0.2–1.3)
BUN SERPL-MCNC: 23 MG/DL (ref 7–30)
BUN SERPL-MCNC: 28 MG/DL (ref 7–30)
CALCIUM SERPL-MCNC: 8.6 MG/DL (ref 8.5–10.1)
CALCIUM SERPL-MCNC: 8.8 MG/DL (ref 8.5–10.1)
CHLORIDE BLD-SCNC: 95 MMOL/L (ref 94–109)
CHLORIDE BLD-SCNC: 95 MMOL/L (ref 94–109)
CO2 SERPL-SCNC: 24 MMOL/L (ref 20–32)
CO2 SERPL-SCNC: 26 MMOL/L (ref 20–32)
CREAT SERPL-MCNC: 1.94 MG/DL (ref 0.66–1.25)
CREAT SERPL-MCNC: 2.2 MG/DL (ref 0.66–1.25)
ERYTHROCYTE [DISTWIDTH] IN BLOOD BY AUTOMATED COUNT: 17.4 % (ref 10–15)
GFR SERPL CREATININE-BSD FRML MDRD: 38 ML/MIN/1.73M2
GFR SERPL CREATININE-BSD FRML MDRD: 45 ML/MIN/1.73M2
GLUCOSE BLD-MCNC: 116 MG/DL (ref 70–99)
GLUCOSE BLD-MCNC: 117 MG/DL (ref 70–99)
HCT VFR BLD AUTO: 27.3 % (ref 40–53)
HGB BLD-MCNC: 8.3 G/DL (ref 13.3–17.7)
LACTATE SERPL-SCNC: 1.7 MMOL/L (ref 0.7–2)
MAGNESIUM SERPL-MCNC: 2 MG/DL (ref 1.6–2.3)
MCH RBC QN AUTO: 29.6 PG (ref 26.5–33)
MCHC RBC AUTO-ENTMCNC: 30.4 G/DL (ref 31.5–36.5)
MCV RBC AUTO: 98 FL (ref 78–100)
OSMOLALITY SERPL: 291 MMOL/KG (ref 275–295)
PHOSPHATE SERPL-MCNC: 5.2 MG/DL (ref 2.5–4.5)
PLATELET # BLD AUTO: 343 10E3/UL (ref 150–450)
POTASSIUM BLD-SCNC: 3.9 MMOL/L (ref 3.4–5.3)
POTASSIUM BLD-SCNC: 4.1 MMOL/L (ref 3.4–5.3)
PROT SERPL-MCNC: 8.6 G/DL (ref 6.8–8.8)
RBC # BLD AUTO: 2.8 10E6/UL (ref 4.4–5.9)
SODIUM SERPL-SCNC: 128 MMOL/L (ref 133–144)
SODIUM SERPL-SCNC: 131 MMOL/L (ref 133–144)
WBC # BLD AUTO: 11.5 10E3/UL (ref 4–11)

## 2021-08-27 PROCEDURE — 120N000002 HC R&B MED SURG/OB UMMC

## 2021-08-27 PROCEDURE — 80053 COMPREHEN METABOLIC PANEL: CPT | Performed by: INTERNAL MEDICINE

## 2021-08-27 PROCEDURE — 250N000013 HC RX MED GY IP 250 OP 250 PS 637: Performed by: INTERNAL MEDICINE

## 2021-08-27 PROCEDURE — 250N000013 HC RX MED GY IP 250 OP 250 PS 637: Performed by: STUDENT IN AN ORGANIZED HEALTH CARE EDUCATION/TRAINING PROGRAM

## 2021-08-27 PROCEDURE — C9113 INJ PANTOPRAZOLE SODIUM, VIA: HCPCS | Performed by: INTERNAL MEDICINE

## 2021-08-27 PROCEDURE — 83605 ASSAY OF LACTIC ACID: CPT | Performed by: INTERNAL MEDICINE

## 2021-08-27 PROCEDURE — 99232 SBSQ HOSP IP/OBS MODERATE 35: CPT | Mod: GC | Performed by: INTERNAL MEDICINE

## 2021-08-27 PROCEDURE — 250N000011 HC RX IP 250 OP 636: Performed by: INTERNAL MEDICINE

## 2021-08-27 PROCEDURE — 36592 COLLECT BLOOD FROM PICC: CPT | Performed by: STUDENT IN AN ORGANIZED HEALTH CARE EDUCATION/TRAINING PROGRAM

## 2021-08-27 PROCEDURE — 36592 COLLECT BLOOD FROM PICC: CPT | Performed by: INTERNAL MEDICINE

## 2021-08-27 PROCEDURE — 84100 ASSAY OF PHOSPHORUS: CPT | Performed by: INTERNAL MEDICINE

## 2021-08-27 PROCEDURE — 85027 COMPLETE CBC AUTOMATED: CPT | Performed by: INTERNAL MEDICINE

## 2021-08-27 PROCEDURE — 83930 ASSAY OF BLOOD OSMOLALITY: CPT | Performed by: STUDENT IN AN ORGANIZED HEALTH CARE EDUCATION/TRAINING PROGRAM

## 2021-08-27 PROCEDURE — 83735 ASSAY OF MAGNESIUM: CPT | Performed by: INTERNAL MEDICINE

## 2021-08-27 RX ORDER — CYCLOBENZAPRINE HCL 5 MG
10 TABLET ORAL DAILY PRN
Status: DISCONTINUED | OUTPATIENT
Start: 2021-08-27 | End: 2021-10-12 | Stop reason: HOSPADM

## 2021-08-27 RX ADMIN — SODIUM BICARBONATE 325 MG: 325 TABLET ORAL at 21:31

## 2021-08-27 RX ADMIN — FOLIC ACID 1 MG: 1 TABLET ORAL at 08:16

## 2021-08-27 RX ADMIN — PANCRELIPASE 2 CAPSULE: 24000; 76000; 120000 CAPSULE, DELAYED RELEASE PELLETS ORAL at 10:08

## 2021-08-27 RX ADMIN — Medication 5 ML: at 08:16

## 2021-08-27 RX ADMIN — HYDROXYZINE HYDROCHLORIDE 50 MG: 25 TABLET, FILM COATED ORAL at 13:00

## 2021-08-27 RX ADMIN — SODIUM BICARBONATE 325 MG: 325 TABLET ORAL at 14:47

## 2021-08-27 RX ADMIN — HYDROMORPHONE HYDROCHLORIDE 1 MG: 1 SOLUTION ORAL at 04:34

## 2021-08-27 RX ADMIN — Medication 1 PACKET: at 21:32

## 2021-08-27 RX ADMIN — OXYCODONE HYDROCHLORIDE 5 MG: 5 SOLUTION ORAL at 08:16

## 2021-08-27 RX ADMIN — THIAMINE HCL TAB 100 MG 100 MG: 100 TAB at 08:16

## 2021-08-27 RX ADMIN — Medication 1 PACKET: at 08:26

## 2021-08-27 RX ADMIN — Medication 5 ML: at 19:00

## 2021-08-27 RX ADMIN — SODIUM BICARBONATE 325 MG: 325 TABLET ORAL at 06:47

## 2021-08-27 RX ADMIN — PANCRELIPASE 2 CAPSULE: 24000; 76000; 120000 CAPSULE, DELAYED RELEASE PELLETS ORAL at 21:31

## 2021-08-27 RX ADMIN — OXYCODONE HYDROCHLORIDE 5 MG: 5 SOLUTION ORAL at 15:03

## 2021-08-27 RX ADMIN — HYDROMORPHONE HYDROCHLORIDE 1 MG: 1 SOLUTION ORAL at 10:20

## 2021-08-27 RX ADMIN — PANCRELIPASE 2 CAPSULE: 24000; 76000; 120000 CAPSULE, DELAYED RELEASE PELLETS ORAL at 06:47

## 2021-08-27 RX ADMIN — SODIUM BICARBONATE 325 MG: 325 TABLET ORAL at 18:55

## 2021-08-27 RX ADMIN — Medication 1 PACKET: at 08:18

## 2021-08-27 RX ADMIN — ACETAMINOPHEN 325 MG: 325 SOLUTION ORAL at 13:00

## 2021-08-27 RX ADMIN — Medication 1 PACKET: at 14:47

## 2021-08-27 RX ADMIN — PANCRELIPASE 2 CAPSULE: 24000; 76000; 120000 CAPSULE, DELAYED RELEASE PELLETS ORAL at 18:55

## 2021-08-27 RX ADMIN — PANTOPRAZOLE SODIUM 40 MG: 40 INJECTION, POWDER, FOR SOLUTION INTRAVENOUS at 08:17

## 2021-08-27 RX ADMIN — PANCRELIPASE 2 CAPSULE: 24000; 76000; 120000 CAPSULE, DELAYED RELEASE PELLETS ORAL at 02:27

## 2021-08-27 RX ADMIN — SODIUM BICARBONATE 325 MG: 325 TABLET ORAL at 10:03

## 2021-08-27 RX ADMIN — PANCRELIPASE 2 CAPSULE: 24000; 76000; 120000 CAPSULE, DELAYED RELEASE PELLETS ORAL at 14:47

## 2021-08-27 RX ADMIN — HYDROMORPHONE HYDROCHLORIDE 1 MG: 1 SOLUTION ORAL at 16:45

## 2021-08-27 RX ADMIN — SODIUM BICARBONATE 325 MG: 325 TABLET ORAL at 02:27

## 2021-08-27 ASSESSMENT — ACTIVITIES OF DAILY LIVING (ADL)
ADLS_ACUITY_SCORE: 20
ADLS_ACUITY_SCORE: 18
ADLS_ACUITY_SCORE: 20

## 2021-08-27 NOTE — PROGRESS NOTES
Care Management Follow Up    Length of Stay (days): 60    Expected Discharge Date: 09/01/2021     Concerns to be Addressed: discharge planning     Patient plan of care discussed at interdisciplinary rounds: Yes    Anticipated Discharge Disposition: Skilled Nursing Facilty, Transitional Care     Anticipated Discharge Services:  (New outpatient dialysis)  Anticipated Discharge DME: None    Patient/family educated on Medicare website which has current facility and service quality ratings: no (Pt requesting TCU in or near [place])  Education Provided on the Discharge Plan: Yes  Patient/Family in Agreement with the Plan: yes    Referrals Placed by CM/SW:      TCU  [facility]- spoke with admissions, who requested referral (facesheet + H&P) be sent on Monday 8/30  [facility]- spoke with admissions who stated they could not accommodate a high acuity pt with their current level of acuity   - LVM with [facility] in admissions    OP dialysis  Referral sent to [facility] admissions requesting placement at their [place] unit     Private pay costs discussed: Not applicable    Additional Information:  SW following for TCU and OP HD placement. Initiated referrals noted above. Per med team, pt is medically ready for discharge.     MAI LVM with [facility] liaison, requesting reason for clinical denial.     SW will continue to follow for discharge placement.     RONNA Delaney, [facility]  [address]   [facility]  Phone: [phone]  Pager: [phone]

## 2021-08-27 NOTE — PLAN OF CARE
[facility] [id number]. VSS on 3-4L. A2 with lift, refuses repositioning. A&Ox4. TF infusing @ goal rate of 50mL/hr through J-tube and G-tube open to gravity.PEG tube site CDI Encouraged to have HOB of bed elevated 30 degrees but refuses at times. HD line CDI. L triple lume PICC TKO x2 and heparin locked. Blanchable redness on coccyx, barrier cream applied. Peeling skin on feet bilaterally. Voiding spontaneously and BM x2 this shift. Complained of abdominal pain radiating to back and given PRN Dilaidud x2. Poor oral intake but is able to swallow medications. Will continue POC.

## 2021-08-27 NOTE — PROGRESS NOTES
[facility]  Progress Note - Maroon 3 Service        Date of Admission:  6/28/2021    Assessment & Plan   Dax Villareal is a 30 year old male with hx of alcohol use disorder admitted on 6/28/2021 after recent prolonged admission at [facility] in [place] who has severe acute alcoholic hepatitis c/b HE, ESRD requiring HD, and malnutrition in the setting of acute necrotizing pancreatitis, s/p PEG-J placement on 7/13. He arrived with acute respiratory failure and septic shock that have resolved, and his secondary bacterial peritonitis has been appropriately treated. He requires continued management for ESRD, hepatic encephalopathy, infected pancreatic pseudocyst, nutritional support via PEG-J, and persistent right pleural effusion. Clinically improving after multiple necrosectomies.     Today:  - Working closely with CM and SW concerning placement; [place]   - urine studies for hyponatremia; likely low solute diet and continued NG output.     Septic Shock  Necrotizing alcoholic pancreatitis w/ infected pseudocyst  Secondary Bacterial Peritonitis 2/2 infected pseudocyst with VRE  Admitted to the [facility] ICU on 6/28/2021 from [facility] in [place] for septic shock due to necrotizing pancreatitis. CT on admit showing mature collection with enhancing wall measuring ~[id number]. IR placed perc drain 6/29. Repeat imaging 7/18 revealed walled off necrotic collection amenable to endoscopic transluminal drainage with cystgastrostomy stenting with necrosectomy on 7/21/21. Complete necrosectomy on 8/11. Antibiotics (cefepime and metronidazole) were discontinued on 8/25 after left flank drain removed. Patient has remained stable.     Abx: Cefepime and metronidazole discontinued 8/25  (Linezolid 7/21-7/30, Meropenem 6/29-7/18, Ceftriaxone 7/21-7/24; 7/26-7/27 Zosyn 7/27-8/6)  BCx: NGTD since 5/18/2021     Gastric Outlet Obstruction  High  G tube output due to the cystogastric connection/fluid drainage noted on 8/5. Per GI, possibly related to gastric outlet from severe abdominal inflammation and is expected. KUB negative for obstructive gas pattern. Continues to have moderate NG output - decreasing.     Hyponatremia  Patient with down-trending sodium the week of 8/27; likely due to substantial NG output. Will send urine/serum osms, urine Na.      Right Pancreatic Hydrothorax, stable  Increased dyspnea with CXR on 7/19 with further imaging and thoracentesis confirming a hepatic/pancreatic hydrothorax. Thoracentesis PRN for dyspnea.     Alcoholic Hepatitis  Coagulation Defect  Received 1 week of steroids at [facility] for elevated INR and altered mental status. Not a liver transplant candidate. Mental status improved with aggressive lactulose and rifaxamin, which was discontinued without return of AMS.     Hepatic Fluid collection vs Abscess  CT Ab/pelvis from 8/13 and 8/21 showing a 3.0 x 2.7 cm hepatic fluid collection. IR consulted and recommended continue ABx without intervention until drain removed. CT imaging on 8/23 shows fluid collection reduced in size measuring 2 x 2.1cm.      ESRD due to ATN  Stage III RADHA due to ATN from septic shock without recovery for >3 months. Line is TDC from PTA. Nephrology dialyzed on Tu, Th, Sa schedule.  - Will need follow-up and outpatient dialysis     Anemia due to Chronic Illness  Hematochezia, resolved  Hemoperitoneum  Baseline hgb 17. Running between 6-8 in setting of chronic illness, new ESRD with epocrit per Nephrology, frequent lab draws and procedures. Hematochezia on 8/8 after starting standard intensity heparin, now resolved. Paracentesis on 8/21 with hemoperitoneum. No active bleeding.   - Goal hgb > 7.0, trend Hgb     Alcohol Use Disorder  Counseled patient on complete alcohol cessation. Not interested in additional services at this time. Continue daily folic acid and thiamine.     Lactic  Acidosis  Intermittent Lactic acidosis in the setting of sepsis, ESRD, bleeding.     Hypokalemia  Noted in the setting of high gastric output and improved once G-tube output decreased. CTM.      Severe Malnutrition  Poor oral intake in the setting of necrotizing pancreatitis. PEG tube placed 7/13. Dietitian consulted and continuous tube feeds with goal of 65 ml/hr with continuous pancreatic enzymes  - Continuous tube feeds decreased to 50 mL overnight.       Diet: Regular Diet Adult  Adult Formula Drip Feeding: Continuous Vital 1.5; Jejunostomy; Goal Rate: 50; mL/hr; Medication - Feeding Tube Flush Frequency: At least 15-30 mL water before and after medication administration and with tube clogging; Amount to Send (Nutrition us...    DVT Prophylaxis: Pneumatic Compression Devices  Rdz Catheter: Not present  Fluids: 10 mL NS TKO  Central Lines: PRESENT  PICC Triple Lumen 06/28/21 Left-Site Assessment: WDL  CVC Double Lumen Right-Site Assessment: WDL  Code Status: Full Code      Disposition Plan   Expected discharge: 3-4 days, recommended to transitional care unit or LTACH once dialysis home found and LTACH accepting patient.      Patient was seen by and discussed with attending physician, Dr. Gregory.     Qamar Jackson MD  Med/Peds, PGY-2  [phone]  [address]  Securely message with the Vocera Web Console (learn more here)  Text page via [employer] Paging/Directory  Please see sign in/sign out for up to date coverage information  _____________________________________________________________________    Interval History      Feeling well this morning, denies pain and nausea. Patient with less NG output yesterday, he is trying to keep track of what he's drinking. Tolerated dialysis yesterday, sodium continues to trend down.     Physical Exam   Vital Signs: Temp: 98.1  F (36.7  C) Temp src: Oral BP: 109/51 Pulse: 114   Resp: 20 SpO2: 94 % O2 Device: None (Room air)  Oxygen Delivery: 2 LPM  Weight: 154 lbs 15.73 oz  Constitutional: Awake, conversational, lying in bed, feeling better  Cardiovascular: tachycardic, no new murmur noted, no edema  Respiratory: Tachypnic, decreased breath sounds on right which are unchanged, NC on for comfort  Abdomen: Tolerates palpation with minimal pain, PEG-tube site clean  Musculoskeletal: Emaciated, moving all extremities on examination  Skin: no Jaundice    Data   Recent Labs   Lab 08/27/21  0731 08/26/21  1419 08/26/21  0657 08/25/21  0637   WBC 11.5*  --  11.9* 12.5*   HGB 8.3*  --  8.6* 8.2*   MCV 98  --  96 95     --  365 332   *  --  129* 129*   POTASSIUM 3.9  --  3.9 3.9   CHLORIDE 95  --  92* 94   CO2 24  --  26 26   BUN 23  --  42* 27   CR 1.94*  --  2.83* 2.14*   ANIONGAP 9  --  11 9   JANENE 8.8  --  8.8 8.7   * 120* 130* 131*   ALBUMIN 2.1*  --  1.7* 1.7*   PROTTOTAL 8.6  --  8.7 8.2   BILITOTAL 1.5*  --  1.6* 1.6*   ALKPHOS 104  --  108 102   ALT 24  --  23 20   AST 56*  --  48* 49*

## 2021-08-28 LAB
ANION GAP SERPL CALCULATED.3IONS-SCNC: 10 MMOL/L (ref 3–14)
BUN SERPL-MCNC: 36 MG/DL (ref 7–30)
CALCIUM SERPL-MCNC: 8.9 MG/DL (ref 8.5–10.1)
CHLORIDE BLD-SCNC: 94 MMOL/L (ref 94–109)
CO2 SERPL-SCNC: 24 MMOL/L (ref 20–32)
CREAT SERPL-MCNC: 2.59 MG/DL (ref 0.66–1.25)
ERYTHROCYTE [DISTWIDTH] IN BLOOD BY AUTOMATED COUNT: 17.1 % (ref 10–15)
GFR SERPL CREATININE-BSD FRML MDRD: 32 ML/MIN/1.73M2
GLUCOSE BLD-MCNC: 104 MG/DL (ref 70–99)
HCT VFR BLD AUTO: 26.2 % (ref 40–53)
HGB BLD-MCNC: 8.2 G/DL (ref 13.3–17.7)
MAGNESIUM SERPL-MCNC: 2.2 MG/DL (ref 1.6–2.3)
MCH RBC QN AUTO: 29.6 PG (ref 26.5–33)
MCHC RBC AUTO-ENTMCNC: 31.3 G/DL (ref 31.5–36.5)
MCV RBC AUTO: 95 FL (ref 78–100)
OSMOLALITY UR: 295 MMOL/KG (ref 100–1200)
PHOSPHATE SERPL-MCNC: 7.1 MG/DL (ref 2.5–4.5)
PLATELET # BLD AUTO: 363 10E3/UL (ref 150–450)
POTASSIUM BLD-SCNC: 4.3 MMOL/L (ref 3.4–5.3)
RBC # BLD AUTO: 2.77 10E6/UL (ref 4.4–5.9)
SODIUM SERPL-SCNC: 128 MMOL/L (ref 133–144)
SODIUM UR-SCNC: 17 MMOL/L
WBC # BLD AUTO: 11.5 10E3/UL (ref 4–11)

## 2021-08-28 PROCEDURE — 250N000013 HC RX MED GY IP 250 OP 250 PS 637: Performed by: INTERNAL MEDICINE

## 2021-08-28 PROCEDURE — 250N000011 HC RX IP 250 OP 636: Performed by: INTERNAL MEDICINE

## 2021-08-28 PROCEDURE — C9113 INJ PANTOPRAZOLE SODIUM, VIA: HCPCS | Performed by: INTERNAL MEDICINE

## 2021-08-28 PROCEDURE — 250N000013 HC RX MED GY IP 250 OP 250 PS 637: Performed by: STUDENT IN AN ORGANIZED HEALTH CARE EDUCATION/TRAINING PROGRAM

## 2021-08-28 PROCEDURE — 85027 COMPLETE CBC AUTOMATED: CPT | Performed by: INTERNAL MEDICINE

## 2021-08-28 PROCEDURE — 83735 ASSAY OF MAGNESIUM: CPT | Performed by: INTERNAL MEDICINE

## 2021-08-28 PROCEDURE — 84100 ASSAY OF PHOSPHORUS: CPT | Performed by: INTERNAL MEDICINE

## 2021-08-28 PROCEDURE — 120N000002 HC R&B MED SURG/OB UMMC

## 2021-08-28 PROCEDURE — 83935 ASSAY OF URINE OSMOLALITY: CPT | Performed by: STUDENT IN AN ORGANIZED HEALTH CARE EDUCATION/TRAINING PROGRAM

## 2021-08-28 PROCEDURE — 36592 COLLECT BLOOD FROM PICC: CPT | Performed by: STUDENT IN AN ORGANIZED HEALTH CARE EDUCATION/TRAINING PROGRAM

## 2021-08-28 PROCEDURE — 84300 ASSAY OF URINE SODIUM: CPT | Performed by: STUDENT IN AN ORGANIZED HEALTH CARE EDUCATION/TRAINING PROGRAM

## 2021-08-28 PROCEDURE — 82310 ASSAY OF CALCIUM: CPT | Performed by: STUDENT IN AN ORGANIZED HEALTH CARE EDUCATION/TRAINING PROGRAM

## 2021-08-28 PROCEDURE — 258N000003 HC RX IP 258 OP 636: Performed by: CLINICAL NURSE SPECIALIST

## 2021-08-28 PROCEDURE — 90937 HEMODIALYSIS REPEATED EVAL: CPT

## 2021-08-28 PROCEDURE — 634N000001 HC RX 634: Performed by: CLINICAL NURSE SPECIALIST

## 2021-08-28 PROCEDURE — 90935 HEMODIALYSIS ONE EVALUATION: CPT | Performed by: STUDENT IN AN ORGANIZED HEALTH CARE EDUCATION/TRAINING PROGRAM

## 2021-08-28 PROCEDURE — 99231 SBSQ HOSP IP/OBS SF/LOW 25: CPT | Mod: GC | Performed by: INTERNAL MEDICINE

## 2021-08-28 RX ADMIN — PANCRELIPASE 2 CAPSULE: 24000; 76000; 120000 CAPSULE, DELAYED RELEASE PELLETS ORAL at 05:35

## 2021-08-28 RX ADMIN — SODIUM BICARBONATE 325 MG: 325 TABLET ORAL at 22:55

## 2021-08-28 RX ADMIN — ACETAMINOPHEN 325 MG: 325 SOLUTION ORAL at 20:47

## 2021-08-28 RX ADMIN — Medication 1 PACKET: at 20:47

## 2021-08-28 RX ADMIN — Medication: at 10:07

## 2021-08-28 RX ADMIN — Medication 1 PACKET: at 14:18

## 2021-08-28 RX ADMIN — SODIUM BICARBONATE 325 MG: 325 TABLET ORAL at 02:11

## 2021-08-28 RX ADMIN — MELATONIN TAB 3 MG 6 MG: 3 TAB at 22:56

## 2021-08-28 RX ADMIN — SODIUM BICARBONATE 325 MG: 325 TABLET ORAL at 05:35

## 2021-08-28 RX ADMIN — HYDROMORPHONE HYDROCHLORIDE 1 MG: 1 SOLUTION ORAL at 14:15

## 2021-08-28 RX ADMIN — HYDROMORPHONE HYDROCHLORIDE 1 MG: 1 SOLUTION ORAL at 18:06

## 2021-08-28 RX ADMIN — SODIUM CHLORIDE 250 ML: 9 INJECTION, SOLUTION INTRAVENOUS at 10:05

## 2021-08-28 RX ADMIN — Medication 5 ML: at 05:29

## 2021-08-28 RX ADMIN — Medication 15 ML: at 16:21

## 2021-08-28 RX ADMIN — Medication 5 ML: at 14:16

## 2021-08-28 RX ADMIN — PANCRELIPASE 2 CAPSULE: 24000; 76000; 120000 CAPSULE, DELAYED RELEASE PELLETS ORAL at 22:55

## 2021-08-28 RX ADMIN — ACETAMINOPHEN 325 MG: 325 SOLUTION ORAL at 05:35

## 2021-08-28 RX ADMIN — SODIUM BICARBONATE 325 MG: 325 TABLET ORAL at 10:06

## 2021-08-28 RX ADMIN — THIAMINE HCL TAB 100 MG 100 MG: 100 TAB at 14:16

## 2021-08-28 RX ADMIN — PANCRELIPASE 2 CAPSULE: 24000; 76000; 120000 CAPSULE, DELAYED RELEASE PELLETS ORAL at 14:11

## 2021-08-28 RX ADMIN — PANCRELIPASE 2 CAPSULE: 24000; 76000; 120000 CAPSULE, DELAYED RELEASE PELLETS ORAL at 17:45

## 2021-08-28 RX ADMIN — OXYCODONE HYDROCHLORIDE 5 MG: 5 SOLUTION ORAL at 22:56

## 2021-08-28 RX ADMIN — PANCRELIPASE 2 CAPSULE: 24000; 76000; 120000 CAPSULE, DELAYED RELEASE PELLETS ORAL at 02:11

## 2021-08-28 RX ADMIN — SODIUM CHLORIDE 300 ML: 9 INJECTION, SOLUTION INTRAVENOUS at 10:05

## 2021-08-28 RX ADMIN — PANTOPRAZOLE SODIUM 40 MG: 40 INJECTION, POWDER, FOR SOLUTION INTRAVENOUS at 14:16

## 2021-08-28 RX ADMIN — FOLIC ACID 1 MG: 1 TABLET ORAL at 14:16

## 2021-08-28 RX ADMIN — OXYCODONE HYDROCHLORIDE 5 MG: 5 SOLUTION ORAL at 00:16

## 2021-08-28 RX ADMIN — SODIUM BICARBONATE 325 MG: 325 TABLET ORAL at 17:46

## 2021-08-28 RX ADMIN — OXYCODONE HYDROCHLORIDE 5 MG: 5 SOLUTION ORAL at 16:21

## 2021-08-28 RX ADMIN — SODIUM BICARBONATE 325 MG: 325 TABLET ORAL at 14:10

## 2021-08-28 RX ADMIN — PANCRELIPASE 2 CAPSULE: 24000; 76000; 120000 CAPSULE, DELAYED RELEASE PELLETS ORAL at 10:07

## 2021-08-28 RX ADMIN — OXYCODONE HYDROCHLORIDE 5 MG: 5 SOLUTION ORAL at 08:31

## 2021-08-28 RX ADMIN — EPOETIN ALFA-EPBX 5000 UNITS: 10000 INJECTION, SOLUTION INTRAVENOUS; SUBCUTANEOUS at 10:06

## 2021-08-28 RX ADMIN — HYDROMORPHONE HYDROCHLORIDE 1 MG: 1 SOLUTION ORAL at 02:52

## 2021-08-28 ASSESSMENT — ACTIVITIES OF DAILY LIVING (ADL)
ADLS_ACUITY_SCORE: 20
ADLS_ACUITY_SCORE: 19
ADLS_ACUITY_SCORE: 20
ADLS_ACUITY_SCORE: 19
ADLS_ACUITY_SCORE: 19

## 2021-08-28 ASSESSMENT — MIFFLIN-ST. JEOR: SCORE: 1547.75

## 2021-08-28 NOTE — PROGRESS NOTES
Nephrology Dialysis Note    This patient was seen and examined while on dialysis. Laboratory results and nurses' notes were reviewed.    No changes to management of volume, anemia, BMD, acidosis, or electrolytes except as follows.     Diagnosis - ESRD (after non-recovery from RADHA)    Plan - Continue 3x weekly HD on TTS schedule.     Devonte Ryan MD  Nephrology  [id number]

## 2021-08-28 NOTE — PLAN OF CARE
"/50 (BP Location: Left arm)   Pulse 114   Temp (!) 96.4  F (35.8  C) (Oral)   Resp 19   Ht 1.676 m (5' 6\")   Wt 70.3 kg (154 lb 15.7 oz)   SpO2 95%   BMI 25.01 kg/m      Care from: [id number]    VSS ex tachycardic on 4 LPM nc, prn Dilaudid and prn Tylenol were given for back pain, prn Flexeril was given for muscle cramp. A&O x4, prn Atarax was given for anxiety. No appetite and oral intake. No urine output- pt is on HD, BM x1 this shift. L PICC gray and white lumens are TKO and red lumen is heparin locked; R CVC is WDL. Dyspnea on exertion, diminished lung sounds in all lobes. Writer changed GJ tube dressing, G tube is to gravity with 1075 mL output, J tube is infusing with TF at 50 mL/hr. Applied barrier cream to blanchable redness at coccyx. Generalized weakness and mobility is mildly impaired, assist of 2, lift. Call light within reach. Continue to follow poc.  "

## 2021-08-28 NOTE — PLAN OF CARE
Assumed Cares: [id number]      Significant Events: PRN dilaudid x1, tylenol x1    Vitals: Tachycardia but otherwise stable of 3L via NC   LDAs: Left PICC, saline locked. GJ tube, G tube to gravity, J tube continuous feeding at goal rate.   Neuro: WDL  Respiratory: WDL, diminished in lower lobes. Denies SOB or difficulty breathing  Cardiac: WDL, denies chest pain, free of edema  GI/: GJ tube, minimal appetite.   Mobility: 2 assist if OOB and for turning. Pt refused turning this shift. Microshifting on back and some side/side noted.   Nutrition: Tube feed at 50ml/hr, regular diet.   Pain: Complains of pain in back and sides. Minimal relief with pain medications.     Plan of care:  Continue with POC, update treatment team as needed with changes.

## 2021-08-28 NOTE — PLAN OF CARE
"/49 (BP Location: Right arm)   Pulse 120   Temp 98.8  F (37.1  C) (Oral)   Resp 16   Ht 1.676 m (5' 6\")   Wt 65 kg (143 lb 4.8 oz)   SpO2 95%   BMI 23.13 kg/m      Care from: [id number]    VSS ex tachycardic in the 120s today- writer notified Daniela baldwin MD has not concern at this point and to keep monitoring, on 4 LPM nc, prn Dilaudid x2 was given for back pain. A&O x4. Poor appetite and oral intake. Oliguria, pt is on HD, BM x2 this shift. L PICC lumens are heparin locked; R CVC is WDL. Dyspnea on exertion, diminished lung sounds in all lobes. Writer changed GJ tube dressing, G tube is to gravity with 25 mL output, J tube is infusing with TF at 50 mL/hr. Applied barrier cream to blanchable redness at coccyx, changed dressing on L lateral back old drain site. Generalized weakness and mobility is mildly impaired, assist of 2, lift. Call light within reach. Continue to follow poc.  "

## 2021-08-28 NOTE — PROGRESS NOTES
HEMODIALYSIS TREATMENT NOTE    Date: 8/28/2021  Time: 1:07 PM    Data:  Pre Wt: 70.3 kg (154 lb 15.7 oz)   Desired Wt: 68.3 kg   Post Wt: 69.3 kg (152 lb 12.5 oz)  Weight change: 1 kg  Ultrafiltration - Post Run Net Total Removed (mL): 1000 mL  Vascular Access Status: patent  Dialyzer Rinse: Streaked, Light  Total Blood Volume Processed: 70.05 L Liters  Total Dialysis (Treatment) Time: 3.5 Hours    Lab:        Interventions:  3.5 hours of HD with 1000 mls pulled with no complications    Assessment:  ESRD patient in for his regular HD run. VSS      Plan:    HD tuesday

## 2021-08-28 NOTE — PLAN OF CARE
Assumed cares [id number]  Patient was rounded on hourly     Pain: Did not endorse pain this shift, appeared to be resting comfortably  Neuro: A&Ox4  Respiratory: Abdominal muscle use, 3 LPM  Cardiac/Neurovascular: WDL, trace edema  GI/: No BM this shift, pt on HD and does not produce urine  Nutrition: Regular diet but no appetite. TF at 50 mL/hour (goal)  Activity: Lift, x1-2 to turn, pt refusing reposition  Skin: Heels peeling and cracking. Blanchable redness on bottom  Access: L PICC, CVC for HD    Events this shift: No acute events this shift. Pt appeared to be resting comfortably between cares. Pt did refuse repositioning and some medications    Plan: Continue with plan of care. Update provider about any changes in patient status.

## 2021-08-28 NOTE — PROGRESS NOTES
[facility]  Progress Note - Maroon 3 Service        Date of Admission:  6/28/2021    Assessment & Plan   Dax Villareal is a 30 year old male with hx of alcohol use disorder admitted on 6/28/2021 after recent prolonged admission at [facility] in [place] who has severe acute alcoholic hepatitis c/b HE, ESRD requiring HD, and malnutrition in the setting of acute necrotizing pancreatitis, s/p PEG-J placement on 7/13. He arrived with acute respiratory failure and septic shock that have resolved, and his secondary bacterial peritonitis has been appropriately treated. He requires continued management for ESRD, hepatic encephalopathy, infected pancreatic pseudocyst, nutritional support via PEG-J, and persistent right pleural effusion. Clinically improving after multiple necrosectomies.     Today:  - Working closely with CM and SW concerning placement; [place]   - Urine studies indicative of what is likely low solute intake; stable CTM    Septic Shock  Necrotizing alcoholic pancreatitis w/ infected pseudocyst  Secondary Bacterial Peritonitis 2/2 infected pseudocyst with VRE  Admitted to the [facility] ICU on 6/28/2021 from [facility] in [place] for septic shock due to necrotizing pancreatitis. CT on admit showing mature collection with enhancing wall measuring ~[id number]. IR placed perc drain 6/29. Repeat imaging 7/18 revealed walled off necrotic collection amenable to endoscopic transluminal drainage with cystgastrostomy stenting with necrosectomy on 7/21/21. Complete necrosectomy on 8/11. Antibiotics (cefepime and metronidazole) were discontinued on 8/25 after left flank drain removed. Patient has remained stable.     Abx: Cefepime and metronidazole discontinued 8/25  (Linezolid 7/21-7/30, Meropenem 6/29-7/18, Ceftriaxone 7/21-7/24; 7/26-7/27 Zosyn 7/27-8/6)  BCx: NGTD since 5/18/2021     Gastric Outlet Obstruction  High G tube  output due to the cystogastric connection/fluid drainage noted on 8/5. Per GI, possibly related to gastric outlet from severe abdominal inflammation and is expected. KUB negative for obstructive gas pattern. Continues to have moderate NG output - decreasing.     Hyponatremia  Patient with down-trending sodium the week of 8/27; likely due to substantial NG output and low solute intake and hepatic dysfunction.      Right Pancreatic Hydrothorax, stable  Increased dyspnea with CXR on 7/19 with further imaging and thoracentesis confirming a hepatic/pancreatic hydrothorax. Thoracentesis PRN for dyspnea.     Alcoholic Hepatitis  Coagulation Defect  Received 1 week of steroids at [facility] for elevated INR and altered mental status. Not a liver transplant candidate. Mental status improved with aggressive lactulose and rifaxamin, which was discontinued without return of AMS.     Hepatic Fluid collection vs Abscess  CT Ab/pelvis from 8/13 and 8/21 showing a 3.0 x 2.7 cm hepatic fluid collection. IR consulted and recommended continue ABx without intervention until drain removed. CT imaging on 8/23 shows fluid collection reduced in size measuring 2 x 2.1cm.      ESRD due to ATN  Stage III RADHA due to ATN from septic shock without recovery for >3 months. Line is TDC from PTA. Nephrology dialyzed on Tu, Th, Sa schedule.  - Will need follow-up and outpatient dialysis     Anemia due to Chronic Illness  Hematochezia, resolved  Hemoperitoneum  Baseline hgb 17. Running between 6-8 in setting of chronic illness, new ESRD with epocrit per Nephrology, frequent lab draws and procedures. Hematochezia on 8/8 after starting standard intensity heparin, now resolved. Paracentesis on 8/21 with hemoperitoneum. No active bleeding.   - Goal hgb > 7.0, trend Hgb     Alcohol Use Disorder  Counseled patient on complete alcohol cessation. Not interested in additional services at this time. Continue daily folic acid and thiamine.     Lactic  Acidosis  Intermittent Lactic acidosis in the setting of sepsis, ESRD, bleeding.     Hypokalemia  Noted in the setting of high gastric output and improved once G-tube output decreased. CTM.      Severe Malnutrition  Poor oral intake in the setting of necrotizing pancreatitis. PEG tube placed 7/13. Dietitian consulted and continuous tube feeds with goal of 65 ml/hr with continuous pancreatic enzymes  - Continuous tube feeds decreased to 50 mL overnight.          Diet: Regular Diet Adult  Adult Formula Drip Feeding: Continuous Vital 1.5; Jejunostomy; Goal Rate: 50; mL/hr; Medication - Feeding Tube Flush Frequency: At least 15-30 mL water before and after medication administration and with tube clogging; Amount to Send (Nutrition us...    DVT Prophylaxis: Pneumatic Compression Devices  Rdz Catheter: Not present  Fluids: 10 mL NS TKO  Central Lines: PRESENT  PICC Triple Lumen 06/28/21 Left-Site Assessment: WDL  CVC Double Lumen Right-Site Assessment: WDL  Code Status: Full Code      Disposition Plan   Expected discharge: 3-4 days, recommended to transitional care unit or LTACH once dialysis home found and LTACH accepting patient.      Patient was seen by and discussed with attending physician, Dr. Gregory.     Qamar Jackson MD  Med/Peds, PGY-2  [phone]  [address]  Securely message with the Vocera Web Console (learn more here)  Text page via [employer] Paging/Directory  Please see sign in/sign out for up to date coverage information  _____________________________________________________________________    Interval History      Dialysis this morning, tolerated well. Feeling more nauseas today, but otherwise without complaint.     Physical Exam   Vital Signs: Temp: 97.1  F (36.2  C) Temp src: Oral BP: 113/69 Pulse: (!) 122   Resp: 22 SpO2: (!) 89 % O2 Device: Nasal cannula Oxygen Delivery: 2 LPM  Weight: 154 lbs 15.73 oz  Constitutional: Awake, conversational,  lying in bed, feeling better  Cardiovascular: tachycardic, no new murmur noted, no edema  Respiratory: Tachypnic, decreased breath sounds on right which are unchanged, NC on for comfort  Abdomen: Tolerates palpation with minimal pain, PEG-tube site clean  Musculoskeletal: Emaciated, moving all extremities on examination  Skin: no Jaundice    Data   Recent Labs   Lab 08/28/21  0536 08/27/21  1331 08/27/21  0731 08/26/21  0657   WBC 11.5*  --  11.5* 11.9*   HGB 8.2*  --  8.3* 8.6*   MCV 95  --  98 96     --  343 365   * 131* 128* 129*   POTASSIUM 4.3 4.1 3.9 3.9   CHLORIDE 94 95 95 92*   CO2 24 26 24 26   BUN 36* 28 23 42*   CR 2.59* 2.20* 1.94* 2.83*   ANIONGAP 10 10 9 11   JANENE 8.9 8.6 8.8 8.8   * 116* 117* 130*   ALBUMIN  --   --  2.1* 1.7*   PROTTOTAL  --   --  8.6 8.7   BILITOTAL  --   --  1.5* 1.6*   ALKPHOS  --   --  104 108   ALT  --   --  24 23   AST  --   --  56* 48*

## 2021-08-29 LAB
HOLD SPECIMEN: NORMAL
MAGNESIUM SERPL-MCNC: 2.1 MG/DL (ref 1.6–2.3)
PHOSPHATE SERPL-MCNC: 5.9 MG/DL (ref 2.5–4.5)
SARS-COV-2 RNA RESP QL NAA+PROBE: NEGATIVE

## 2021-08-29 PROCEDURE — 83735 ASSAY OF MAGNESIUM: CPT | Performed by: INTERNAL MEDICINE

## 2021-08-29 PROCEDURE — 250N000013 HC RX MED GY IP 250 OP 250 PS 637: Performed by: STUDENT IN AN ORGANIZED HEALTH CARE EDUCATION/TRAINING PROGRAM

## 2021-08-29 PROCEDURE — 250N000011 HC RX IP 250 OP 636: Performed by: INTERNAL MEDICINE

## 2021-08-29 PROCEDURE — 120N000002 HC R&B MED SURG/OB UMMC

## 2021-08-29 PROCEDURE — 250N000013 HC RX MED GY IP 250 OP 250 PS 637: Performed by: INTERNAL MEDICINE

## 2021-08-29 PROCEDURE — 99232 SBSQ HOSP IP/OBS MODERATE 35: CPT | Mod: GC | Performed by: INTERNAL MEDICINE

## 2021-08-29 PROCEDURE — 36592 COLLECT BLOOD FROM PICC: CPT | Performed by: INTERNAL MEDICINE

## 2021-08-29 PROCEDURE — C9113 INJ PANTOPRAZOLE SODIUM, VIA: HCPCS | Performed by: INTERNAL MEDICINE

## 2021-08-29 PROCEDURE — 84100 ASSAY OF PHOSPHORUS: CPT | Performed by: INTERNAL MEDICINE

## 2021-08-29 PROCEDURE — U0005 INFEC AGEN DETEC AMPLI PROBE: HCPCS | Performed by: STUDENT IN AN ORGANIZED HEALTH CARE EDUCATION/TRAINING PROGRAM

## 2021-08-29 RX ADMIN — PANCRELIPASE 2 CAPSULE: 24000; 76000; 120000 CAPSULE, DELAYED RELEASE PELLETS ORAL at 10:01

## 2021-08-29 RX ADMIN — SODIUM BICARBONATE 325 MG: 325 TABLET ORAL at 14:43

## 2021-08-29 RX ADMIN — PANTOPRAZOLE SODIUM 40 MG: 40 INJECTION, POWDER, FOR SOLUTION INTRAVENOUS at 07:50

## 2021-08-29 RX ADMIN — SODIUM BICARBONATE 325 MG: 325 TABLET ORAL at 02:13

## 2021-08-29 RX ADMIN — HYDROMORPHONE HYDROCHLORIDE 1 MG: 1 SOLUTION ORAL at 07:54

## 2021-08-29 RX ADMIN — OXYCODONE HYDROCHLORIDE 5 MG: 5 SOLUTION ORAL at 06:05

## 2021-08-29 RX ADMIN — PANCRELIPASE 2 CAPSULE: 24000; 76000; 120000 CAPSULE, DELAYED RELEASE PELLETS ORAL at 02:13

## 2021-08-29 RX ADMIN — SODIUM BICARBONATE 325 MG: 325 TABLET ORAL at 22:26

## 2021-08-29 RX ADMIN — HYDROMORPHONE HYDROCHLORIDE 1 MG: 1 SOLUTION ORAL at 16:43

## 2021-08-29 RX ADMIN — PANCRELIPASE 2 CAPSULE: 24000; 76000; 120000 CAPSULE, DELAYED RELEASE PELLETS ORAL at 18:24

## 2021-08-29 RX ADMIN — ONDANSETRON 4 MG: 2 INJECTION INTRAMUSCULAR; INTRAVENOUS at 07:50

## 2021-08-29 RX ADMIN — OXYCODONE HYDROCHLORIDE 5 MG: 5 SOLUTION ORAL at 14:57

## 2021-08-29 RX ADMIN — SODIUM BICARBONATE 325 MG: 325 TABLET ORAL at 18:23

## 2021-08-29 RX ADMIN — Medication 1 PACKET: at 20:46

## 2021-08-29 RX ADMIN — Medication 1 PACKET: at 07:55

## 2021-08-29 RX ADMIN — FOLIC ACID 1 MG: 1 TABLET ORAL at 07:55

## 2021-08-29 RX ADMIN — Medication 15 ML: at 14:56

## 2021-08-29 RX ADMIN — OXYCODONE HYDROCHLORIDE 5 MG: 5 SOLUTION ORAL at 22:26

## 2021-08-29 RX ADMIN — ACETAMINOPHEN 325 MG: 325 SOLUTION ORAL at 10:07

## 2021-08-29 RX ADMIN — PANCRELIPASE 2 CAPSULE: 24000; 76000; 120000 CAPSULE, DELAYED RELEASE PELLETS ORAL at 22:25

## 2021-08-29 RX ADMIN — Medication 1 PACKET: at 07:56

## 2021-08-29 RX ADMIN — THIAMINE HCL TAB 100 MG 100 MG: 100 TAB at 07:55

## 2021-08-29 RX ADMIN — HYDROMORPHONE HYDROCHLORIDE 1 MG: 1 SOLUTION ORAL at 11:37

## 2021-08-29 RX ADMIN — PANCRELIPASE 2 CAPSULE: 24000; 76000; 120000 CAPSULE, DELAYED RELEASE PELLETS ORAL at 06:04

## 2021-08-29 RX ADMIN — HYDROXYZINE HYDROCHLORIDE 50 MG: 25 TABLET, FILM COATED ORAL at 10:07

## 2021-08-29 RX ADMIN — MELATONIN TAB 3 MG 6 MG: 3 TAB at 22:29

## 2021-08-29 RX ADMIN — SODIUM BICARBONATE 325 MG: 325 TABLET ORAL at 10:02

## 2021-08-29 RX ADMIN — HYDROMORPHONE HYDROCHLORIDE 1 MG: 1 SOLUTION ORAL at 02:20

## 2021-08-29 RX ADMIN — Medication 1 PACKET: at 14:58

## 2021-08-29 RX ADMIN — Medication 5 ML: at 07:55

## 2021-08-29 RX ADMIN — PANCRELIPASE 2 CAPSULE: 24000; 76000; 120000 CAPSULE, DELAYED RELEASE PELLETS ORAL at 14:43

## 2021-08-29 RX ADMIN — SODIUM BICARBONATE 325 MG: 325 TABLET ORAL at 06:05

## 2021-08-29 ASSESSMENT — ACTIVITIES OF DAILY LIVING (ADL)
ADLS_ACUITY_SCORE: 15
ADLS_ACUITY_SCORE: 16
ADLS_ACUITY_SCORE: 15
ADLS_ACUITY_SCORE: 20

## 2021-08-29 ASSESSMENT — MIFFLIN-ST. JEOR: SCORE: 1567.75

## 2021-08-29 NOTE — PROGRESS NOTES
[facility]  Progress Note - Maroon 3 Service        Date of Admission:  6/28/2021    Assessment & Plan   Dax Villareal is a 30 year old male with hx of alcohol use disorder admitted on 6/28/2021 after recent prolonged admission at [facility] in [place] who has severe acute alcoholic hepatitis c/b HE, ESRD requiring HD, and malnutrition in the setting of acute necrotizing pancreatitis, s/p PEG-J placement on 7/13. He arrived with acute respiratory failure and septic shock that have resolved, and his secondary bacterial peritonitis has been appropriately treated. He requires continued management for ESRD, hepatic encephalopathy, infected pancreatic pseudocyst, nutritional support via PEG-J, and persistent right pleural effusion. Clinically improving after multiple necrosectomies.     Today:   - Working closely with CM and SW concerning placement; insurance making placement difficult in the [facility], awaiting update for Monday  - Urine studies indicative of what is likely low solute intake; stable CTM    Septic Shock  Necrotizing alcoholic pancreatitis w/ infected pseudocyst  Secondary Bacterial Peritonitis 2/2 infected pseudocyst with VRE  Admitted to the [facility] ICU on 6/28/2021 from [facility] in [place] for septic shock due to necrotizing pancreatitis. CT on admit showing mature collection with enhancing wall measuring ~[id number]. IR placed perc drain 6/29. Repeat imaging 7/18 revealed walled off necrotic collection amenable to endoscopic transluminal drainage with cystgastrostomy stenting with necrosectomy on 7/21/21. Complete necrosectomy on 8/11. Antibiotics (cefepime and metronidazole) were discontinued on 8/25 after left flank drain removed. Patient has remained stable.     Abx: Cefepime and metronidazole discontinued 8/25  (Linezolid 7/21-7/30, Meropenem 6/29-7/18, Ceftriaxone 7/21-7/24; 7/26-7/27 Zosyn 7/27-8/6)  BCx: NGTD since 5/18/2021     Gastric  Outlet Obstruction  High G tube output due to the cystogastric connection/fluid drainage noted on 8/5. Per GI, possibly related to gastric outlet from severe abdominal inflammation and is expected. KUB negative for obstructive gas pattern. Continues to have moderate NG output - decreasing.     Hyponatremia  Patient with down-trending sodium the week of 8/27; likely due to substantial NG output and low solute intake and hepatic dysfunction.      Right Pancreatic Hydrothorax, stable  Increased dyspnea with CXR on 7/19 with further imaging and thoracentesis confirming a hepatic/pancreatic hydrothorax. Thoracentesis PRN for dyspnea.     Alcoholic Hepatitis  Coagulation Defect  Received 1 week of steroids at [facility] for elevated INR and altered mental status. Not a liver transplant candidate. Mental status improved with aggressive lactulose and rifaxamin, which was discontinued without return of AMS.     Hepatic Fluid collection vs Abscess  CT Ab/pelvis from 8/13 and 8/21 showing a 3.0 x 2.7 cm hepatic fluid collection. IR consulted and recommended continue ABx without intervention until drain removed. CT imaging on 8/23 shows fluid collection reduced in size measuring 2 x 2.1cm.      ESRD due to ATN  Stage III RADHA due to ATN from septic shock without recovery for >3 months. Line is TDC from [facility]. Nephrology dialyzed on Tu, Th, Sa schedule.  - Will need follow-up and outpatient dialysis     Anemia due to Chronic Illness  Hematochezia, resolved  Hemoperitoneum  Baseline hgb 17. Running between 6-8 in setting of chronic illness, new ESRD with epocrit per Nephrology, frequent lab draws and procedures. Hematochezia on 8/8 after starting standard intensity heparin, now resolved. Paracentesis on 8/21 with hemoperitoneum. No active bleeding.   - Goal hgb > 7.0, trend Hgb     Alcohol Use Disorder  Counseled patient on complete alcohol cessation. Not interested in additional services at this time. Continue daily folic acid and  thiamine.     Lactic Acidosis  Intermittent Lactic acidosis in the setting of sepsis, ESRD, bleeding.     Hypokalemia  Noted in the setting of high gastric output and improved once G-tube output decreased. CTM.      Severe Malnutrition  Poor oral intake in the setting of necrotizing pancreatitis. PEG tube placed 7/13. Dietitian consulted and continuous tube feeds with goal of 65 ml/hr with continuous pancreatic enzymes  - Continuous tube feeds decreased to 50 mL overnight.          Diet: Regular Diet Adult  Adult Formula Drip Feeding: Continuous Vital 1.5; Jejunostomy; Goal Rate: 50; mL/hr; Medication - Feeding Tube Flush Frequency: At least 15-30 mL water before and after medication administration and with tube clogging; Amount to Send (Nutrition us...    DVT Prophylaxis: Pneumatic Compression Devices  Rdz Catheter: Not present  Fluids: 10 mL NS TKO  Central Lines: PRESENT  PICC Triple Lumen 06/28/21 Left-Site Assessment: WDL  CVC Double Lumen Right-Site Assessment: WDL  Code Status: Full Code      Disposition Plan   Expected discharge: 3-4 days, recommended to transitional care unit or LTACH once dialysis home found and LTACH accepting patient.      Patient was seen by and discussed with attending physician, Dr. Gregory.     Ailyn Soto DDS  [address]  Securely message with the [employer] Web Console (learn more here)  Text page via [employer] Paging/Directory  Please see sign in/sign out for up to date coverage information  _____________________________________________________________________    Interval History    Patient reports he's feeling fairly well this morning, that his pain is controlled, no nausea. He's starting to feel anxious about leaving; his concern is that he won't receive the same cares. Assured the patient he's being discharged to a facility so that he can continue his cares while working towards improving his strength.     Physical Exam   Vital  Signs: Temp: 96.9  F (36.1  C) Temp src: Oral BP: 112/54 Pulse: (!) 122   Resp: 18 SpO2: 90 % O2 Device: None (Room air) Oxygen Delivery: 4 LPM  Weight: 143 lbs 4.78 oz  Constitutional: Awake, conversational, lying in bed, feeling better  Cardiovascular: Warm and well purfused  Respiratory: Breathing comfortably on room air  Abdomen: Tolerates palpation with minimal pain, PEG-tube site clean  Musculoskeletal: Emaciated, moving all extremities on examination  Skin: no Jaundice    Data   Recent Labs   Lab 08/28/21  0536 08/27/21  1331 08/27/21  0731 08/26/21  0657   WBC 11.5*  --  11.5* 11.9*   HGB 8.2*  --  8.3* 8.6*   MCV 95  --  98 96     --  343 365   * 131* 128* 129*   POTASSIUM 4.3 4.1 3.9 3.9   CHLORIDE 94 95 95 92*   CO2 24 26 24 26   BUN 36* 28 23 42*   CR 2.59* 2.20* 1.94* 2.83*   ANIONGAP 10 10 9 11   JANENE 8.9 8.6 8.8 8.8   * 116* 117* 130*   ALBUMIN  --   --  2.1* 1.7*   PROTTOTAL  --   --  8.6 8.7   BILITOTAL  --   --  1.5* 1.6*   ALKPHOS  --   --  104 108   ALT  --   --  24 23   AST  --   --  56* 48*

## 2021-08-29 NOTE — PLAN OF CARE
Assumed Cares: [id number]      Significant Events: No acute events this shift. TF At 50ml/hr. PRN dilaudid x1 for pain.     Vitals: VSS on 4lpm  LDAs: L PICC, saline locked. GJ hooked to gravity suction and TF at 50ml/hr.  Neuro: WDL   Respiratory: WDL, denies SOB   Cardiac: WDL, denies chest pain   GI/: Denies nausea this shift. No BM this shift.   Mobility: Assist of 2 if OOB with lift  Nutrition: Regular, TF at 50 ml/hr   Pain: Dilaudid x1, scheduled oxycodone.     Plan of care:  Continue with POC, update treatment team as needed with changes. Awaiting placement.

## 2021-08-29 NOTE — PLAN OF CARE
"Assumed cares 9519-1132  Patient was rounded on hourly     Events this shift: No acute events this shift. 2 large BM, 100 mL of emesis. Pt requesting that TF be paused until he can \"warm up and get comfortable.\" TF paused at 2300. Pt appeared to be resting comfortably between cares.     Plan: Continue with plan of care. Update provider about any changes in patient status.     "

## 2021-08-29 NOTE — PLAN OF CARE
"/53 (BP Location: Right arm)   Pulse 118   Temp 97.8  F (36.6  C) (Oral)   Resp 20   Ht 1.676 m (5' 6\")   Wt 67 kg (147 lb 11.3 oz)   SpO2 92%   BMI 23.84 kg/m      Care from: [id number]    VSS ex tachycardic, on 3 LPM nc, prn Dilaudid x3, prn Tylenol x1, and prn Atarax x1 were given for back pain. A&O x4. Poor appetite and oral intake. GJ tube dressing is CDI, G tube is to gravity with 950 mL output, J tube is infusing with TF at 50 mL/hr. Oliguria, pt is on HD, BM x1 this shift. Changed caps for L PICC lumens, and lumens are heparin locked; R CVC is WDL. Dyspnea on exertion, diminished lung sounds in all lobes. Applied barrier cream to blanchable redness at coccyx, changed dressing on L lateral back old drain site. Generalized weakness and mobility is mildly impaired, assist of 1-2, lift. Asymptomatic COVID swab was negative. Call light within reach. Continue to follow poc.  "

## 2021-08-30 LAB
ANION GAP SERPL CALCULATED.3IONS-SCNC: 12 MMOL/L (ref 3–14)
BUN SERPL-MCNC: 43 MG/DL (ref 7–30)
CALCIUM SERPL-MCNC: 9.2 MG/DL (ref 8.5–10.1)
CHLORIDE BLD-SCNC: 94 MMOL/L (ref 94–109)
CO2 SERPL-SCNC: 22 MMOL/L (ref 20–32)
CREAT SERPL-MCNC: 2.92 MG/DL (ref 0.66–1.25)
GFR SERPL CREATININE-BSD FRML MDRD: 27 ML/MIN/1.73M2
GLUCOSE BLD-MCNC: 120 MG/DL (ref 70–99)
MAGNESIUM SERPL-MCNC: 2.2 MG/DL (ref 1.6–2.3)
PHOSPHATE SERPL-MCNC: 8 MG/DL (ref 2.5–4.5)
POTASSIUM BLD-SCNC: 4.3 MMOL/L (ref 3.4–5.3)
SODIUM SERPL-SCNC: 128 MMOL/L (ref 133–144)

## 2021-08-30 PROCEDURE — 250N000013 HC RX MED GY IP 250 OP 250 PS 637: Performed by: STUDENT IN AN ORGANIZED HEALTH CARE EDUCATION/TRAINING PROGRAM

## 2021-08-30 PROCEDURE — 250N000011 HC RX IP 250 OP 636: Performed by: INTERNAL MEDICINE

## 2021-08-30 PROCEDURE — 84100 ASSAY OF PHOSPHORUS: CPT | Performed by: INTERNAL MEDICINE

## 2021-08-30 PROCEDURE — 250N000013 HC RX MED GY IP 250 OP 250 PS 637: Performed by: INTERNAL MEDICINE

## 2021-08-30 PROCEDURE — 36592 COLLECT BLOOD FROM PICC: CPT | Performed by: INTERNAL MEDICINE

## 2021-08-30 PROCEDURE — 99232 SBSQ HOSP IP/OBS MODERATE 35: CPT | Mod: GC | Performed by: INTERNAL MEDICINE

## 2021-08-30 PROCEDURE — 120N000002 HC R&B MED SURG/OB UMMC

## 2021-08-30 PROCEDURE — 83735 ASSAY OF MAGNESIUM: CPT | Performed by: INTERNAL MEDICINE

## 2021-08-30 PROCEDURE — 80048 BASIC METABOLIC PNL TOTAL CA: CPT | Performed by: STUDENT IN AN ORGANIZED HEALTH CARE EDUCATION/TRAINING PROGRAM

## 2021-08-30 PROCEDURE — C9113 INJ PANTOPRAZOLE SODIUM, VIA: HCPCS | Performed by: INTERNAL MEDICINE

## 2021-08-30 RX ADMIN — HYDROXYZINE HYDROCHLORIDE 25 MG: 25 TABLET, FILM COATED ORAL at 14:10

## 2021-08-30 RX ADMIN — Medication 1 PACKET: at 09:31

## 2021-08-30 RX ADMIN — SODIUM BICARBONATE 325 MG: 325 TABLET ORAL at 16:21

## 2021-08-30 RX ADMIN — HYDROMORPHONE HYDROCHLORIDE 1 MG: 1 SOLUTION ORAL at 09:04

## 2021-08-30 RX ADMIN — THIAMINE HCL TAB 100 MG 100 MG: 100 TAB at 09:31

## 2021-08-30 RX ADMIN — PANCRELIPASE 2 CAPSULE: 24000; 76000; 120000 CAPSULE, DELAYED RELEASE PELLETS ORAL at 05:37

## 2021-08-30 RX ADMIN — PANCRELIPASE 2 CAPSULE: 24000; 76000; 120000 CAPSULE, DELAYED RELEASE PELLETS ORAL at 13:49

## 2021-08-30 RX ADMIN — Medication 1 PACKET: at 08:59

## 2021-08-30 RX ADMIN — Medication 5 ML: at 08:59

## 2021-08-30 RX ADMIN — SODIUM BICARBONATE 325 MG: 325 TABLET ORAL at 05:37

## 2021-08-30 RX ADMIN — HYDROXYZINE HYDROCHLORIDE 50 MG: 25 TABLET, FILM COATED ORAL at 02:55

## 2021-08-30 RX ADMIN — Medication 1 PACKET: at 19:58

## 2021-08-30 RX ADMIN — HYDROMORPHONE HYDROCHLORIDE 1 MG: 1 SOLUTION ORAL at 02:54

## 2021-08-30 RX ADMIN — PANTOPRAZOLE SODIUM 40 MG: 40 INJECTION, POWDER, FOR SOLUTION INTRAVENOUS at 09:30

## 2021-08-30 RX ADMIN — PANCRELIPASE 2 CAPSULE: 24000; 76000; 120000 CAPSULE, DELAYED RELEASE PELLETS ORAL at 10:35

## 2021-08-30 RX ADMIN — SODIUM BICARBONATE 325 MG: 325 TABLET ORAL at 02:46

## 2021-08-30 RX ADMIN — FOLIC ACID 1 MG: 1 TABLET ORAL at 09:31

## 2021-08-30 RX ADMIN — PANCRELIPASE 1 CAPSULE: 24000; 76000; 120000 CAPSULE, DELAYED RELEASE PELLETS ORAL at 19:58

## 2021-08-30 RX ADMIN — ONDANSETRON 4 MG: 2 INJECTION INTRAMUSCULAR; INTRAVENOUS at 11:04

## 2021-08-30 RX ADMIN — Medication 5 ML: at 16:30

## 2021-08-30 RX ADMIN — PANCRELIPASE 2 CAPSULE: 24000; 76000; 120000 CAPSULE, DELAYED RELEASE PELLETS ORAL at 02:47

## 2021-08-30 RX ADMIN — HYDROMORPHONE HYDROCHLORIDE 1 MG: 1 SOLUTION ORAL at 13:47

## 2021-08-30 RX ADMIN — HYDROMORPHONE HYDROCHLORIDE 1 MG: 1 SOLUTION ORAL at 19:57

## 2021-08-30 RX ADMIN — OXYCODONE HYDROCHLORIDE 5 MG: 5 SOLUTION ORAL at 06:30

## 2021-08-30 RX ADMIN — OXYCODONE HYDROCHLORIDE 5 MG: 5 SOLUTION ORAL at 16:29

## 2021-08-30 RX ADMIN — SODIUM BICARBONATE 325 MG: 325 TABLET ORAL at 10:35

## 2021-08-30 ASSESSMENT — ACTIVITIES OF DAILY LIVING (ADL)
ADLS_ACUITY_SCORE: 19

## 2021-08-30 ASSESSMENT — MIFFLIN-ST. JEOR: SCORE: 1598.75

## 2021-08-30 NOTE — PROGRESS NOTES
[facility]  Progress Note - Maroon 3 Service        Date of Admission:  6/28/2021    Assessment & Plan   Dax Villareal is a 30 year old male with hx of alcohol use disorder admitted on 6/28/2021 after recent prolonged admission at [facility] in [place] who has severe acute alcoholic hepatitis c/b HE, ESRD requiring HD, and malnutrition in the setting of acute necrotizing pancreatitis, s/p PEG-J placement on 7/13. He arrived with acute respiratory failure and septic shock that have resolved, and his secondary bacterial peritonitis has been appropriately treated. He requires continued management for ESRD, hepatic encephalopathy, infected pancreatic pseudocyst, nutritional support via PEG-J, and persistent right pleural effusion. Clinically improving after multiple necrosectomies.     Today:   - Working closely with CM and SW concerning placement; insurance making placement difficult in the [facility], awaiting update for Monday    Septic Shock  Necrotizing alcoholic pancreatitis w/ infected pseudocyst  Secondary Bacterial Peritonitis 2/2 infected pseudocyst with VRE  Admitted to the [facility] ICU on 6/28/2021 from [facility] in [place] for septic shock due to necrotizing pancreatitis. CT on admit showing mature collection with enhancing wall measuring ~[id number]. IR placed perc drain 6/29. Repeat imaging 7/18 revealed walled off necrotic collection amenable to endoscopic transluminal drainage with cystgastrostomy stenting with necrosectomy on 7/21/21. Complete necrosectomy on 8/11. Antibiotics (cefepime and metronidazole) were discontinued on 8/25 after left flank drain removed. Patient has remained stable.     Abx: Cefepime and metronidazole discontinued 8/25  (Linezolid 7/21-7/30, Meropenem 6/29-7/18, Ceftriaxone 7/21-7/24; 7/26-7/27 Zosyn 7/27-8/6)  BCx: NGTD since 5/18/2021     Gastric Outlet Obstruction  High G tube output due to the cystogastric connection/fluid  drainage noted on 8/5. Per GI, possibly related to gastric outlet from severe abdominal inflammation and is expected. KUB negative for obstructive gas pattern. Continues to have moderate NG output - decreasing.     Hyponatremia, stable  Patient with down-trending sodium the week of 8/27; likely due to substantial NG output and low solute intake and hepatic dysfunction. Stable     Right Pancreatic Hydrothorax, stable  Increased dyspnea with CXR on 7/19 with further imaging and thoracentesis confirming a hepatic/pancreatic hydrothorax. Thoracentesis PRN for dyspnea.     Alcoholic Hepatitis  Coagulation Defect  Received 1 week of steroids at [facility] for elevated INR and altered mental status. Not a liver transplant candidate. Mental status improved with aggressive lactulose and rifaxamin, which was discontinued without return of AMS.     Hepatic Fluid collection vs Abscess  CT Ab/pelvis from 8/13 and 8/21 showing a 3.0 x 2.7 cm hepatic fluid collection. IR consulted and recommended continue ABx without intervention until drain removed. CT imaging on 8/23 shows fluid collection reduced in size measuring 2 x 2.1cm.      ESRD due to ATN  Stage III RADHA due to ATN from septic shock without recovery for >3 months. Line is TDC from [facility]. Nephrology dialyzed on Tu, Th, Sa schedule.  - Will need follow-up and outpatient dialysis     Anemia due to Chronic Illness  Hematochezia, resolved  Hemoperitoneum  Baseline hgb 17. Running between 6-8 in setting of chronic illness, new ESRD with epocrit per Nephrology, frequent lab draws and procedures. Hematochezia on 8/8 after starting standard intensity heparin, now resolved. Paracentesis on 8/21 with hemoperitoneum. No active bleeding.   - Goal hgb > 7.0, trend Hgb     Alcohol Use Disorder  Counseled patient on complete alcohol cessation. Not interested in additional services at this time. Continue daily folic acid and thiamine.     Lactic Acidosis  Intermittent Lactic acidosis in the  setting of sepsis, ESRD, bleeding.     Hypokalemia  Noted in the setting of high gastric output and improved once G-tube output decreased. CTM.      Severe Malnutrition  Poor oral intake in the setting of necrotizing pancreatitis. PEG tube placed 7/13. Dietitian consulted and continuous tube feeds with goal of 65 ml/hr with continuous pancreatic enzymes  - Continuous tube feeds decreased to 50 mL overnight.          Diet: Regular Diet Adult  Adult Formula Drip Feeding: Continuous Vital 1.5; Jejunostomy; Goal Rate: 50; mL/hr; Medication - Feeding Tube Flush Frequency: At least 15-30 mL water before and after medication administration and with tube clogging; Amount to Send (Nutrition us...    DVT Prophylaxis: Pneumatic Compression Devices  Rdz Catheter: Not present  Fluids: 10 mL NS TKO  Central Lines: PRESENT  PICC Triple Lumen 06/28/21 Left-Site Assessment: WDL  CVC Double Lumen Right-Site Assessment: WDL  Code Status: Full Code      Disposition Plan   Expected discharge: 3-4 days, recommended to transitional care unit or LTACH once dialysis home found and LTACH accepting patient.      Patient was seen by and discussed with attending physician, Dr. Gregory.     Ailyn Soto DDS  [address]  Securely message with the [employer] Web Console (learn more here)  Text page via [employer] Paging/Directory  Please see sign in/sign out for up to date coverage information  _____________________________________________________________________    Interval History    Sleeping this morning, feeling well and without complaints. Dialysis tomorrow - lytes wnl this morning.     Physical Exam   Vital Signs: Temp: 97.8  F (36.6  C) Temp src: Oral BP: 115/49 Pulse: 120   Resp: 16 SpO2: 92 % O2 Device: Nasal cannula Oxygen Delivery: 3 LPM  Weight: 147 lbs 11.33 oz  Constitutional: Awake, conversational, lying in bed, feeling better  Cardiovascular: Warm and well purfused  Respiratory:  Breathing comfortably on room air  Abdomen: Tolerates palpation with minimal pain, PEG-tube site clean  Musculoskeletal: Emaciated, moving all extremities on examination  Skin: no Jaundice    Data   Recent Labs   Lab 08/30/21  0533 08/28/21  0536 08/27/21  1331 08/27/21  0731 08/26/21  0657   WBC  --  11.5*  --  11.5* 11.9*   HGB  --  8.2*  --  8.3* 8.6*   MCV  --  95  --  98 96   PLT  --  363  --  343 365   * 128* 131* 128* 129*   POTASSIUM 4.3 4.3 4.1 3.9 3.9   CHLORIDE 94 94 95 95 92*   CO2 22 24 26 24 26   BUN 43* 36* 28 23 42*   CR 2.92* 2.59* 2.20* 1.94* 2.83*   ANIONGAP 12 10 10 9 11   JANENE 9.2 8.9 8.6 8.8 8.8   * 104* 116* 117* 130*   ALBUMIN  --   --   --  2.1* 1.7*   PROTTOTAL  --   --   --  8.6 8.7   BILITOTAL  --   --   --  1.5* 1.6*   ALKPHOS  --   --   --  104 108   ALT  --   --   --  24 23   AST  --   --   --  56* 48*

## 2021-08-30 NOTE — PLAN OF CARE
"[id number] Slept for the majority of the shift; about 5 hours. Complaints of back and shoulder pain. Had one episode of emesis 200 cc; zofran given and G tube open to gravity. Tube feeds running at 50 mL pause at 1352 for N/V. Unable to receive zofran at this time; cold applied to neck. Declined meal intake. Offered shower/ADL; states \"later\", un willing to state a time: \"all depends on how I am feeling.\" Tachycardia persist. Medication management for tachycardia? Had two loose stools. Supplied ordered for G tube dressing change.   "

## 2021-08-30 NOTE — PROGRESS NOTES
Care Management Follow Up    Length of Stay (days): 63    Expected Discharge Date: 09/03/2021     Concerns to be Addressed: discharge planning     Patient plan of care discussed at interdisciplinary rounds: Yes    Anticipated Discharge Disposition: Skilled Nursing Facilty, Transitional Care     Anticipated Discharge Services: (New outpatient dialysis)  Anticipated Discharge DME: None    Patient/family educated on Medicare website which has current facility and service quality ratings: no (Pt requesting TCU in or near [place])  Education Provided on the Discharge Plan: Yes   Patient/Family in Agreement with the Plan: yes    Referrals Placed by CM/SW:     OP dialysis- referral sent to [facility]   TCU referrals pending     Private pay costs discussed: Not applicable    Additional Information:  SW following for discharge placement. Received update from  financial counseling that pt's [employer] PM[employer] plan only provides in network coverage for the [facility]. Pt is planning on moving to [place] and would prefer TCU and dialysis in [place], so he will likely need to switch to a different MA plan in order to gain financial clearance at facilities. SW spoke with Jody,  financial counselor, who attempted to discuss pt's insurance with him via phone, but pt told bedside RN he did not want to speak with her and requested she call his sister Noni instead. However, pt will likely need to speak with Jody directly in order to switch his MA plan as this [place] direct communication with the county. SW also attempted to meet with pt today to discuss placement options, but pt deferred to his sister for this discussion as well. Will re-attempt to engage pt tomorrow.     Pt is medically ready to discharge. Pt's insurance status is a primary barrier to discharge at this time. Pt also needs an accepting TCU and dialysis facility.     SW will continue to follow for discharge placement.     RONNA Delaney,  LICSW  [address]   [facility]  Phone: [phone]  Pager: [phone]

## 2021-08-30 NOTE — PLAN OF CARE
Assumed Cares: [id number]      Significant Events: PRN dilaudid x1. No acute events noted overnight. Pt slept between cares. No BM this shift. TF at 50 ml/hr. Left PICC heparin locked. G tube hooked to gravity drainage. VSS on RA, intermittently uses O2 for comfort.     Plan of care:  Continue with POC, update treatment team as needed with changes.

## 2021-08-30 NOTE — PLAN OF CARE
Assumed cares 5547-8608  Patient was rounded on hourly     Events this shift: No acute events this shift. Pt appeared to be resting comfortably between cares. No BM this shift. TF at 50 mL/hour, tolerating well.     Plan: Continue with plan of care. Update provider about any changes in patient status.

## 2021-08-31 LAB
ALBUMIN SERPL-MCNC: 2.1 G/DL (ref 3.4–5)
ALP SERPL-CCNC: 100 U/L (ref 40–150)
ALT SERPL W P-5'-P-CCNC: 31 U/L (ref 0–70)
ANION GAP SERPL CALCULATED.3IONS-SCNC: 14 MMOL/L (ref 3–14)
AST SERPL W P-5'-P-CCNC: 50 U/L (ref 0–45)
BILIRUB SERPL-MCNC: 1.7 MG/DL (ref 0.2–1.3)
BUN SERPL-MCNC: 60 MG/DL (ref 7–30)
CALCIUM SERPL-MCNC: 9 MG/DL (ref 8.5–10.1)
CHLORIDE BLD-SCNC: 90 MMOL/L (ref 94–109)
CO2 SERPL-SCNC: 23 MMOL/L (ref 20–32)
CREAT SERPL-MCNC: 4.1 MG/DL (ref 0.66–1.25)
ERYTHROCYTE [DISTWIDTH] IN BLOOD BY AUTOMATED COUNT: 16.7 % (ref 10–15)
GFR SERPL CREATININE-BSD FRML MDRD: 18 ML/MIN/1.73M2
GLUCOSE BLD-MCNC: 126 MG/DL (ref 70–99)
HCT VFR BLD AUTO: 24.7 % (ref 40–53)
HGB BLD-MCNC: 7.8 G/DL (ref 13.3–17.7)
LACTATE SERPL-SCNC: 1.8 MMOL/L (ref 0.7–2)
MAGNESIUM SERPL-MCNC: 2.5 MG/DL (ref 1.6–2.3)
MCH RBC QN AUTO: 29.7 PG (ref 26.5–33)
MCHC RBC AUTO-ENTMCNC: 31.6 G/DL (ref 31.5–36.5)
MCV RBC AUTO: 94 FL (ref 78–100)
PHOSPHATE SERPL-MCNC: 9.7 MG/DL (ref 2.5–4.5)
PLATELET # BLD AUTO: 336 10E3/UL (ref 150–450)
POTASSIUM BLD-SCNC: 4.6 MMOL/L (ref 3.4–5.3)
PROT SERPL-MCNC: 9.1 G/DL (ref 6.8–8.8)
RBC # BLD AUTO: 2.63 10E6/UL (ref 4.4–5.9)
SODIUM SERPL-SCNC: 127 MMOL/L (ref 133–144)
WBC # BLD AUTO: 9.6 10E3/UL (ref 4–11)

## 2021-08-31 PROCEDURE — 250N000013 HC RX MED GY IP 250 OP 250 PS 637: Performed by: INTERNAL MEDICINE

## 2021-08-31 PROCEDURE — 250N000011 HC RX IP 250 OP 636: Performed by: INTERNAL MEDICINE

## 2021-08-31 PROCEDURE — 83605 ASSAY OF LACTIC ACID: CPT | Performed by: STUDENT IN AN ORGANIZED HEALTH CARE EDUCATION/TRAINING PROGRAM

## 2021-08-31 PROCEDURE — 84100 ASSAY OF PHOSPHORUS: CPT | Performed by: INTERNAL MEDICINE

## 2021-08-31 PROCEDURE — C9113 INJ PANTOPRAZOLE SODIUM, VIA: HCPCS | Performed by: INTERNAL MEDICINE

## 2021-08-31 PROCEDURE — 258N000003 HC RX IP 258 OP 636: Performed by: CLINICAL NURSE SPECIALIST

## 2021-08-31 PROCEDURE — 90937 HEMODIALYSIS REPEATED EVAL: CPT

## 2021-08-31 PROCEDURE — 36592 COLLECT BLOOD FROM PICC: CPT | Performed by: STUDENT IN AN ORGANIZED HEALTH CARE EDUCATION/TRAINING PROGRAM

## 2021-08-31 PROCEDURE — 99232 SBSQ HOSP IP/OBS MODERATE 35: CPT | Mod: GC | Performed by: INTERNAL MEDICINE

## 2021-08-31 PROCEDURE — 250N000013 HC RX MED GY IP 250 OP 250 PS 637: Performed by: STUDENT IN AN ORGANIZED HEALTH CARE EDUCATION/TRAINING PROGRAM

## 2021-08-31 PROCEDURE — 634N000001 HC RX 634: Performed by: CLINICAL NURSE SPECIALIST

## 2021-08-31 PROCEDURE — 80053 COMPREHEN METABOLIC PANEL: CPT | Performed by: STUDENT IN AN ORGANIZED HEALTH CARE EDUCATION/TRAINING PROGRAM

## 2021-08-31 PROCEDURE — 99233 SBSQ HOSP IP/OBS HIGH 50: CPT | Mod: GC | Performed by: PEDIATRICS

## 2021-08-31 PROCEDURE — 250N000013 HC RX MED GY IP 250 OP 250 PS 637: Performed by: PEDIATRICS

## 2021-08-31 PROCEDURE — 85027 COMPLETE CBC AUTOMATED: CPT | Performed by: STUDENT IN AN ORGANIZED HEALTH CARE EDUCATION/TRAINING PROGRAM

## 2021-08-31 PROCEDURE — 120N000002 HC R&B MED SURG/OB UMMC

## 2021-08-31 PROCEDURE — 83735 ASSAY OF MAGNESIUM: CPT | Performed by: INTERNAL MEDICINE

## 2021-08-31 RX ADMIN — Medication 1 PACKET: at 08:30

## 2021-08-31 RX ADMIN — HYDROXYZINE HYDROCHLORIDE 50 MG: 25 TABLET, FILM COATED ORAL at 21:21

## 2021-08-31 RX ADMIN — FOLIC ACID 1 MG: 1 TABLET ORAL at 08:28

## 2021-08-31 RX ADMIN — Medication: at 13:15

## 2021-08-31 RX ADMIN — HYDROMORPHONE HYDROCHLORIDE 1 MG: 1 SOLUTION ORAL at 10:44

## 2021-08-31 RX ADMIN — OXYCODONE HYDROCHLORIDE 5 MG: 5 SOLUTION ORAL at 00:05

## 2021-08-31 RX ADMIN — SODIUM CHLORIDE 300 ML: 9 INJECTION, SOLUTION INTRAVENOUS at 13:14

## 2021-08-31 RX ADMIN — Medication 5 ML: at 08:27

## 2021-08-31 RX ADMIN — Medication 1 PACKET: at 08:29

## 2021-08-31 RX ADMIN — PANCRELIPASE 2 CAPSULE: 24000; 76000; 120000 CAPSULE, DELAYED RELEASE PELLETS ORAL at 01:49

## 2021-08-31 RX ADMIN — THIAMINE HCL TAB 100 MG 100 MG: 100 TAB at 08:28

## 2021-08-31 RX ADMIN — OXYCODONE HYDROCHLORIDE 5 MG: 5 SOLUTION ORAL at 22:26

## 2021-08-31 RX ADMIN — MELATONIN TAB 3 MG 6 MG: 3 TAB at 01:48

## 2021-08-31 RX ADMIN — SODIUM BICARBONATE 325 MG: 325 TABLET ORAL at 01:49

## 2021-08-31 RX ADMIN — PANCRELIPASE 2 CAPSULE: 24000; 76000; 120000 CAPSULE, DELAYED RELEASE PELLETS ORAL at 06:16

## 2021-08-31 RX ADMIN — HYDROMORPHONE HYDROCHLORIDE 1 MG: 1 SOLUTION ORAL at 19:42

## 2021-08-31 RX ADMIN — Medication 5 ML: at 08:33

## 2021-08-31 RX ADMIN — PANTOPRAZOLE SODIUM 40 MG: 40 INJECTION, POWDER, FOR SOLUTION INTRAVENOUS at 08:27

## 2021-08-31 RX ADMIN — Medication 1 PACKET: at 19:42

## 2021-08-31 RX ADMIN — OXYCODONE HYDROCHLORIDE 5 MG: 5 SOLUTION ORAL at 16:36

## 2021-08-31 RX ADMIN — SODIUM BICARBONATE 325 MG: 325 TABLET ORAL at 06:16

## 2021-08-31 RX ADMIN — PANCRELIPASE 2 CAPSULE: 24000; 76000; 120000 CAPSULE, DELAYED RELEASE PELLETS ORAL at 10:33

## 2021-08-31 RX ADMIN — MELATONIN TAB 3 MG 6 MG: 3 TAB at 21:21

## 2021-08-31 RX ADMIN — EPOETIN ALFA-EPBX 5000 UNITS: 10000 INJECTION, SOLUTION INTRAVENOUS; SUBCUTANEOUS at 13:13

## 2021-08-31 RX ADMIN — PANCRELIPASE 2 CAPSULE: 24000; 76000; 120000 CAPSULE, DELAYED RELEASE PELLETS ORAL at 21:21

## 2021-08-31 RX ADMIN — Medication 10 ML: at 16:36

## 2021-08-31 RX ADMIN — OXYCODONE HYDROCHLORIDE 5 MG: 5 SOLUTION ORAL at 08:27

## 2021-08-31 RX ADMIN — HYDROXYZINE HYDROCHLORIDE 50 MG: 25 TABLET, FILM COATED ORAL at 01:48

## 2021-08-31 RX ADMIN — SODIUM CHLORIDE 250 ML: 9 INJECTION, SOLUTION INTRAVENOUS at 13:15

## 2021-08-31 RX ADMIN — SODIUM BICARBONATE 325 MG: 325 TABLET ORAL at 21:21

## 2021-08-31 RX ADMIN — SODIUM BICARBONATE 325 MG: 325 TABLET ORAL at 16:52

## 2021-08-31 RX ADMIN — HYDROMORPHONE HYDROCHLORIDE 1 MG: 1 SOLUTION ORAL at 04:30

## 2021-08-31 RX ADMIN — SODIUM BICARBONATE 325 MG: 325 TABLET ORAL at 10:33

## 2021-08-31 RX ADMIN — ONDANSETRON 4 MG: 2 INJECTION INTRAMUSCULAR; INTRAVENOUS at 02:27

## 2021-08-31 RX ADMIN — PANCRELIPASE 2 CAPSULE: 24000; 76000; 120000 CAPSULE, DELAYED RELEASE PELLETS ORAL at 16:49

## 2021-08-31 ASSESSMENT — ACTIVITIES OF DAILY LIVING (ADL)
ADLS_ACUITY_SCORE: 19

## 2021-08-31 ASSESSMENT — MIFFLIN-ST. JEOR: SCORE: 1547.75

## 2021-08-31 ASSESSMENT — PAIN DESCRIPTION - DESCRIPTORS
DESCRIPTORS: SORE
DESCRIPTORS: ACHING;SORE

## 2021-08-31 NOTE — PROGRESS NOTES
Nephrology Progress Note  08/31/2021         Dax Villareal is a 30 year old male with h/o ETOH hepatitis, end stage liver disease, RADHA on HD, transferred from [facility] in [place] for septic shock on 6/28/21 for treatment of necrotizing pancreatitis and decompensated liver disease. He was initially admitted to [facility] 5/19/21 and started on RRT 5/26/2021.        Interval History :   Mr Villareal had necrosectomy for 4th time on 8/11 with no more anticipated.  Running HD on TTS schedule, has had large GI drain output so have not needed to pull much UF with runs, ordered for 1L of UF.  Stable from HD standpoint, working on getting to rehab.       Assessment & Recommendations:   ESRD-Cr was 0.8 as recently as 5/19/2021 but now HD dependent for 3 months, started RRT at [facility] prior to transfer to [facility] on 5/26.  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  continuing restorative cares for now, BP's have been stable enough to support HD.  Had GI stenting of pancreas x4.                 -Line is TDC from PTA               - HD on TTS schedule unless issues arise requiring change in plan.      Volume status-Anuric, net negative on his own with drain output so needing to give volume on run today.  No volume overload on exam, EDW is 70kg.        Electrolytes/pH-K 4.6, bicarb 23, no acute issues.  Na 127, runs chronically low.        Ca/phos/pth-Ca 9.0, corrects to normal with low albumin.  Mg and Phos reasonable.       Anemia-Hgb low at 7.8, acute management per team, last PRBC's 7/14.  Started on EPO 5k with runs, iron sats 43%      Nutrition-Taking PO, appetite reasonable.       Seen and discussed with Dr Hanley      Recommendations were communicated to primary team via note       SOFIA Marlow CNS  Clinical Nurse Specialist  [phone]  Attestation:  This patient has been seen, examined and evaluated by me, Pia Hanley MD as a  "shared visit with the NP/PA above.     In brief:  Dax Villareal is a 30 year old male with ESKD being managed in the setting of resolving necrotizing pancreatitis. Cont to have high output from drain.    I personally reviewed major complaints, physical findings, investigations, medications, lab values, vital signs, I/O's and the overall management plan.      My key findings and Decisions:  1. ESKD: dialysis per schedule    2. Volume: Given large drain outputs we don't have much fluid to pull on HD      Pia Hanley MD MS  Date of service (date I saw patient) 31 Aug 2021    Review of Systems:   I reviewed the following systems:  Gen: No fevers or chills  CV: No CP at rest  Resp: No SOB at rest  GI: No N/V    Physical Exam:   I/O last 3 completed shifts:  In: 1009 [P.O.:60; I.V.:30; NG/GT:519]  Out: 1150 [Urine:50; Emesis/NG output:1100]   /46 (BP Location: Right arm)   Pulse 110   Temp 97.4  F (36.3  C) (Oral)   Resp 20   Ht 1.676 m (5' 6\")   Wt 70.1 kg (154 lb 8.7 oz)   SpO2 93%   BMI 24.94 kg/m       GENERAL APPEARANCE: Lying in bed, in no distress.    EYES:  scleral icterus, pupils equal  Pulmonary: decreased BS   CV: tachy   - Edema - no LE edema  GI: mild distention, non-tender. Has pancreatic drain with large amounts of yellowish clear non purulent fluid  MS: no evidence of inflammation in joints, no muscle tenderness  SKIN: no rash, warm, dry, no cyanosis  NEURO: alert/interactive  ACCESS: Right TDC with no induration or erythema    Labs:   All labs reviewed by me  Electrolytes/Renal - Recent Labs   Lab Test 08/31/21  0837 08/30/21  0533 08/29/21  0708 08/28/21  0536   * 128*  --  128*   POTASSIUM 4.6 4.3  --  4.3   CHLORIDE 90* 94  --  94   CO2 23 22  --  24   BUN 60* 43*  --  36*   CR 4.10* 2.92*  --  2.59*   * 120*  --  104*   JANENE 9.0 9.2  --  8.9   MAG 2.5* 2.2 2.1 2.2   PHOS 9.7* 8.0* 5.9* 7.1*       CBC -   Recent Labs   Lab Test 08/31/21  0837 08/28/21  0536 08/27/21  0731 "   WBC 9.6 11.5* 11.5*   HGB 7.8* 8.2* 8.3*    363 343       LFTs -   Recent Labs   Lab Test 08/31/21  0837 08/27/21  0731 08/26/21  0657   ALKPHOS 100 104 108   BILITOTAL 1.7* 1.5* 1.6*   ALT 31 24 23   AST 50* 56* 48*   PROTTOTAL 9.1* 8.6 8.7   ALBUMIN 2.1* 2.1* 1.7*       Iron Panel -   Recent Labs   Lab Test 08/14/21 2055 07/19/21  0632   IRON 28* 31*   IRONSAT 21 43   JERRELL 2,186*  --            Current Medications:    sodium chloride 0.9%  250 mL Intravenous Once in dialysis/CRRT     sodium chloride 0.9%  300 mL Hemodialysis Machine Once     sodium bicarbonate  325 mg Per Feeding Tube Q4H    And     amylase-lipase-protease  1-2 capsule Per Feeding Tube Q4H     amylase-lipase-protease  2 capsule Oral TID w/meals     B and C vitamin Complex with folic acid  5 mL Per Feeding Tube Daily     banatrol plus  1 packet Per Feeding Tube TID     epoetin charles-epbx (RETACRIT) inj ESRD  5,000 Units Intravenous Once in dialysis/CRRT     folic acid  1 mg Oral Daily     heparin lock flush  5-20 mL Intracatheter Q24H     melatonin  6 mg Oral At Bedtime     menthol   Transdermal Q8H     - MEDICATION INSTRUCTIONS -   Does not apply Once     oxyCODONE  5 mg Oral Q8H     pantoprazole (PROTONIX) IV  40 mg Intravenous Daily with breakfast     protein modular  1 packet Per Feeding Tube BID     sodium chloride (PF)  3 mL Intracatheter Q8H     sodium chloride (PF)  73 mL Intravenous Once     sodium chloride (PF)  75 mL Intravenous Once     sodium chloride (PF)  86 mL Intravenous Once     sodium chloride (PF)  9 mL Intracatheter During Dialysis/CRRT (from stock)     sodium chloride (PF)  9 mL Intracatheter During Dialysis/CRRT (from stock)     vitamin B1  100 mg Oral Daily    Or     thiamine  100 mg Intravenous Daily       - MEDICATION INSTRUCTIONS -       dextrose       heparin (porcine) 1,000 Units/hr (08/24/21 0907)

## 2021-08-31 NOTE — PLAN OF CARE
"Blood pressure 92/44, pulse 111, temperature 97.2  F (36.2  C), temperature source Oral, resp. rate 18, height 1.676 m (5' 6\"), weight 70.1 kg (154 lb 8.7 oz), SpO2 94 %.via 3L NC.      Patient A & O X 4 , VSS with soft BP c/o back pain , received scheduled Oxycodone via PEG x 2 , Dilaudid oral x 1 post assessment with some relief. Patient had HD run  today. Right CVC intact PICC line patent . Patient with poor appetite , patient started on Nova source renal ,  G- to open to gravity and J tube feeding running @ 25 ml/hr to be increased  10 ml,  q4hr  the goal 45 ml/hr and water flush 20 ml/hr  PEG dressing CDI.  Patient coccyx reddened and inct of stool x 2 inct care done and applied barrier cream. Patient encouraged to be repositioned q2hr and as tolerated. Call light within reach bed alarm on and hourly round completed . Continue monitor closely and update MD with change.      "

## 2021-08-31 NOTE — PROGRESS NOTES
CLINICAL NUTRITION SERVICES - REASSESSMENT NOTE     Nutrition Prescription    RECOMMENDATIONS FOR MDs/PROVIDERS TO ORDER:  1. Most significant indications for a renal TF formula would by elevated K+ and phos levels. Patient with elevated phos trends over the past week, ( up to 9 today and continues with HD therapy) and therefore will switch TF formula to a renal base formula of Novasource renal. This formula also provides more volume restrictions.    2. Adjusted free water flushes to 20 ml /hr @ continuous rate via feed and flush (to provides 480 ml additional water daily ).  ---Further need to increase free water flushes per nephrology recommendations!!! Since patient is anuric on HD.   --Please note patient has high G-tube output (1475 ml yesterday, 950 ml (8/29), 1325 ml (8/28) and 2400 ml on (8/27). Not on IVF.       Malnutrition Status:    Severe malnutrition in the context of acute on chronic illness     Recommendations already ordered by Registered Dietitian (RD):  Enteral nutrition recommendation:  Access: Jejunostomy tube of the G/J tube  Dosing wt: 70 kg EDW     EN: Ordered to re-start  tube feeds with renal formula, Novasource Renal @ 25 ml/hr, advance by 10 ml every 4 hours to new goal at 45 ml/hr.    Novasource Renal @ 45 ml/hr (1080 ml) provides 2160 kcal (31 kcal/kg)), 98 gm pro (1.4 gm/kg ), 198 gm CHO, 108 gm fat, 774 ml free water, and 0 gm fiber daily.     Modules: Discontinued protein supplementation  PERT: Adjusted PERT to reflect change in TF provision   Fiber: Continue with Banatrol TID via FT      FWF: Increased to 20 ml/hr at continuous rate via feed and flush (480 ml FWF to avoid FT clogging with renal concentrated formula ), Otherwise, Further need to increase free water per nephrology recommendations).    --Patient also with high G-tube output ( 1475 ml yesterday, 950 ml 8/29, 1325 ml (8/28) and 2400 ml on 8/27. Not on IVF.     Future/Additional Recommendations:  Monitor phos/mg++/K+  trend  Monitor loose stool trend, need to increase soluble fiber amount        EVALUATION OF THE PROGRESS TOWARD GOALS   Diet:   Regular diet     Nutrition Support: ( on TF since 6/30 admit)  Access: PEG-J, tube feed via Jejunostomy (s/p PEG-J placement on 7/13 for possible Gastric outlet obstruction from inflammation / suspect pseudocyst ), G-tube open to gravity intermittently due to  nausea/emesis with large output.     Dosing wt: lowest wt this admit 68 kg (driest wt on 8/12)    EN: Vital 1.5 Leno @ goal of 50 ml/hr (1200 ml/day)  --Provision: 1800 kcals (26 kcal/kg), 81 gm PRO 1.2 gm/kg), 916 ml free H2O, 224 gm CHO, and 7 gm soluble fiber daily.    Modules:   # Soluble Fiber packets, Banatrol plus, one packet TID  ( 40 kcal per packet + 2 gm soluble fiber per packet).     # Protein supplementation via FT (Prosource TF Free, vegan protein supplement), 1 packet BID. Provides additional 180 kcal and 30 gm protein.    -TF at goal infusion @ 50 ml/hr  + Prosource TF Free => 1980 kcal (29 kcal/kg) and 111 gm protein (1.6 gm/kg)     FWF: 60 mL q 4 hours (8/6) for patency only, ( Otherwise, free water per nephrology recommendations).      PERT: Creon 24, 2 capsules + 325 mg bicarb solution, Q 4 hours via FT->  providing 3512 units lipase/total gm fat in tube feeds.      GI: Stool x2 yesterday, Daily liquid stool noted 2-6 per day (Patient is on Banatrol plus soluble fiber, 1 packet TID via FT also on PERT), Not on lactulose therapy, however antibioticus could have contributed to increase in daily loose stool, Abx discontinued on 8/25.       Intake:   PO: Visited with Patient this morning. Patient continues with low appetite. Per nursing flow sheet, mostly 0% meal intake. Continues to rely on TFs to provide for full nutrition needs. Patient notes ongoing desire to infuse TF at continuous rate.     EN:  Average 7 day of EN intake (840 ml daily) ( met 70% of the TF goal volume) => provided on average ~ 1260 kcal/day and  57 gm protein/day.    TF + Prosource Free => provided 1440 kcal/day ( 22 kcal/kg) and 87 gm protein ( 1.3 gm/kg).       Updated 8/31: ASSESSED NUTRITION NEEDS per 70 kg New  EDW:   Estimated Energy Needs: 25-30 ++kcals/day, (1750 - 2100 ++ kcals/kg)   Justification: ++ for hypermetabolism with pancreatitis/HD and liver disease   Estimated Protein Needs: 105 + g/day (1.5 ++g/kg)   Justification: pancreatitis and Hypercatabolism with critical illness / HD repletion   Estimated Fluid Needs: Per provider pending fluid status (ESLD/HD)      NEW FINDINGS   Chart reviewed:   --Transferred from OSH after prolonged hospitalization with septic shock.   --Patient was admitted on 6/28/2021 with acute respiratory failure and septic shock, necrotizing pancreatitis with infected pseudocyst, Secondary bacterial peritonitis due to infected pseudocyst and decompensated liver.     --PMH: Alcoholic hepatitis c/b ESLD,  HE, ESRD requiring HD ( has been on HD x 3 month) and malnutrition in the setting of acute necrotizing pancreatitis, s/p PEG-J placement on 7/13/21.     --Admitted secondary bacterial peritonitis (treated)  --ESRD (HD dependent), Anuric, EDW: 70 kg   --HE ( ammonia 28 on admit 6/28/21), Mental status improved with aggressive lactulose and rifaximin, which was discontinued.  --Necrotizing pancreatitis with infected pancreatic pseudocyst s/p RP Drain and Cystogastrostomy, and persistent right pleural effusion.   --8/25 - IR perc drain removed, antibiotics stopped 8/25  --Multiple Necrosectomies, last one on 8/11  --Resp: On O2 (3-4 liters) due to hepatic and pancreatic hydrothorax, on thoracentesis PRN      WOC RN note on 8/25:  Peg tube wounds due to Moisture Associated Skin Damage (MASD)  Status: healing, now hypergranular tissue present    WT trend:   Admit wt: 80.2 kg (6/28/21)--> lowest wt this admit 67.5 kg (8/12)--> up to 70.1 kg (154 lb 8.7 oz) kg most recent wt on 8/30.   EDW:70 kg per nephrology.  Will continue  to use dosing wt of 68 kg driest wt available from 21.     Meds:  Sodium Bicarbonate/Creon, Creon with meals, nephronex 5 ml daily per FT, folic acid, Protonix, Thiamine, calcium carbonate 500 mg daily   Not on Lactulose!     Labs:   CRP: 81 (21), WBC:9.6 (on  normal range), albumin: 2.1 (depleted)  Fecal elastase: 162 (L) on    BUN:60, Cr: 4.10, Na+: 127 (L)-> On HD ->  (K+:4.6, Mg++:2.5 (H), Phos: 9.7 on  )  Bicarb: 23 (normal range)   Total bili: 1.6 (trending down slowly), INR: 1.86 (elevated)   B (elevated)       MALNUTRITION  % Intake: On TF's with frequent interruptions   % Weight Loss: 10% net wt loss since admit -> severe ( also on HD/ascites)  Subcutaneous Fat Loss: Global Severe / cachectic appearing  Muscle Loss: Global Severe / cachectic appearing  Fluid Accumulation/Edema: 1+ Generalized edema ( trace), 0 BLE   Malnutrition Diagnosis: Severe malnutrition in the context of Chronic condition     Previous Goals   Total avg nutritional intake to meet a minimum of 25 kcal/kg and 1.3 gm PRO/kg daily (per dosing wt 67.5 kg).  Evaluation: Not met for kcals.     Previous Nutrition Diagnosis  Inadequate protein-energy intake related to interruption to TF support and inadequate oral intake as evidenced by average 5 days TF met 76% of goal TF volume with minimal po intake over the past 7 days    Evaluation: No change    CURRENT NUTRITION DIAGNOSIS  Inadequate protein-energy intake related to interruption to TF support with ongoing anorexia/poor po as evidenced by average 7 days TF met 70% of goal TF volume with minimal to 0% PO intake over the past 7 days.       INTERVENTIONS  Implementation  Enteral Nutrition - changed to a renal base TF formulary   Feeding tube flush - increased to feed and flush    Modules: Discontinued Protein supplementation, continue with Banatrol 1 packet TID for frequent stool    Goals  Total avg nutritional intake to meet a minimum of 25 kcal/kg and 1.5 gm PRO/kg  daily (per dosing wt 70 kg new established EDW ).    Monitoring/Evaluation  Progress toward goals will be monitored and evaluated per protocol.      Jesica Blunt RD/MARYELLEN  Pager [phone]

## 2021-08-31 NOTE — PLAN OF CARE
Alert and oriented x 4. Complained of pain in the arms, back and shoulders. Oxycodone and dilaudid given. Tachycardic and dyspnea on exertion. Oxygen 3-4 liters in use. Triggered sepsis and lactic was 1.8. Tube feeding running at 50 ml/hr. Had 100 ml emesis without warning when repositioned. Zofran given x 1. Passed small amount of loose stool using the bedpan.

## 2021-08-31 NOTE — PROGRESS NOTES
[facility] [place]  Progress Note - [place] 3 Service        Date of Admission:  6/28/2021    Assessment & Plan   Dax Villareal is a 30 year old male with hx of alcohol use disorder admitted on 6/28/2021 after recent prolonged admission at [facility] in [place] who has severe acute alcoholic hepatitis c/b HE, ESRD requiring HD, and malnutrition in the setting of acute necrotizing pancreatitis, s/p PEG-J placement on 7/13. He arrived with acute respiratory failure and septic shock that have resolved, and his secondary bacterial peritonitis has been appropriately treated. He requires continued management for ESRD, hepatic encephalopathy, infected pancreatic pseudocyst, nutritional support via PEG-J, and persistent right pleural effusion. Clinically improving after multiple necrosectomies.     Today:   - Working closely with CM and SW concerning placement; [place], [place]  - Persistently tachycardic for several weeks, may be related to large outputs (got fluid back today in dialysis), will monitor, may need bolus   - RD increased free water water flushes with TF    #Septic Shock  #Necrotizing alcoholic pancreatitis w/ infected pseudocyst  #Secondary Bacterial Peritonitis 2/2 infected pseudocyst with VRE  Ad[facility] to [facility] ICU on 6/28/2021 from [facility] in [place] for septic shock due to necrotizing pancreatitis. CT on admit showing mature collection with enhancing wall measuring ~[id number]. IR placed perc drain 6/29. Repeat imaging 7/18 revealed walled off necrotic collection amenable to endoscopic transluminal drainage with cystgastrostomy stenting with necrosectomy on 7/21/21. Complete necrosectomy on 8/11. Antibiotics (cefepime and metronidazole) were discontinued on 8/25 after left flank drain removed. Patient has remained stable.   BCx: NGTD since 5/18/2021     #Gastric Outlet Obstruction  High G tube output  due to the cystogastric connection/fluid drainage noted on 8/5. Per GI, possibly related to gastric outlet from severe abdominal inflammation and is expected. KUB negative for obstructive gas pattern. Continues to have moderate NG output - stable.     #Hyponatremia, stable  Patient with down-trending sodium the week of 8/27; likely due to substantial NG output and low solute intake and hepatic dysfunction. Stable     #Right Pancreatic Hydrothorax, stable  Increased dyspnea with CXR on 7/19 with further imaging and thoracentesis confirming a hepatic/pancreatic hydrothorax. Thoracentesis PRN for dyspnea.     #Alcoholic Hepatitis  #Coagulation Defect  Received 1 week of steroids at [facility] for elevated INR and altered mental status. Not a liver transplant candidate. Mental status improved with aggressive lactulose and rifaxamin, which was discontinued without return of AMS.     #Hepatic Fluid collection vs Abscess  CT Ab/pelvis from 8/13 and 8/21 showing a 3.0 x 2.7 cm hepatic fluid collection. IR consulted and recommended continue ABx without intervention until drain removed. CT imaging on 8/23 shows fluid collection reduced in size measuring 2 x 2.1cm.      ESRD due to ATN  Stage III RADHA due to ATN from septic shock without recovery for >3 months. Line is TDC from PTA. Nephrology dialyzed on Tu, Th, Sa schedule.  - Will need follow-up and outpatient dialysis     #Anemia due to Chronic Illness  #Hematochezia, resolved  #Hemoperitoneum  Baseline hgb 17. Running between 6-8 in setting of chronic illness, new ESRD with epocrit per Nephrology, frequent lab draws and procedures. Hematochezia on 8/8 after starting standard intensity heparin, now resolved. Paracentesis on 8/21 with hemoperitoneum. No active bleeding.   - Goal hgb > 7.0, trend Hgb     #Alcohol Use Disorder  Counseled patient on complete alcohol cessation. Not interested in additional services at this time. Continue daily folic acid and  thiamine.     #Lactic Acidosis  Intermittent Lactic acidosis in the setting of sepsis, ESRD, bleeding.     #Hypokalemia  Noted in the setting of high gastric output and improved once G-tube output decreased. CTM.      #Severe Malnutrition  Poor oral intake in the setting of necrotizing pancreatitis. PEG tube placed 7/13. Dietitian consulted and continuous tube feeds with goal of 65 ml/hr with continuous pancreatic enzymes  - Continuous tube feeds decreased to 50 mL overnight.          Diet: Regular Diet Adult  Adult Formula Drip Feeding: Continuous Novasource Renal; Jejunostomy; Goal Rate: 45; mL/hr; Medication - Feeding Tube Flush Frequency: At least 15-30 mL water before and after medication administration and with tube clogging; Amount to Send (Nutri...    DVT Prophylaxis: Pneumatic Compression Devices  Rdz Catheter: Not present  Fluids: 10 mL NS TKO  Central Lines: PRESENT  PICC Triple Lumen 06/28/21 Left-Site Assessment: WDL  CVC Double Lumen Right-Site Assessment: WDL  Code Status: Full Code      Disposition Plan   Expected discharge: 3-4 days, recommended to transitional care unit or LTACH once dialysis home found and LTACH accepting patient.      Patient was seen by and discussed with attending physician, Dr. Joshua.     Ailyn Soto DDS  [address]  Securely message with the Vocera Web Console (learn more here)  Text page via [employer] Paging/Directory  Please see sign in/sign out for up to date coverage information  _____________________________________________________________________    Interval History    Patient reports pain and nausea this morning prior to getting scheduled medications - no other complaints, feels that his breathing is without change. Will dialyze later today. Talked about working with  to possibly change insurance.     Physical Exam   Vital Signs: Temp: 97.4  F (36.3  C) Temp src: Oral BP: 99/54 Pulse: 113   Resp: 20 SpO2: 96 %  O2 Device: Nasal cannula Oxygen Delivery: 3 LPM  Weight: 154 lbs 8.68 oz  Constitutional: Awake, conversational, lying in bed, feeling nauseated   Cardiovascular: Warm and well purfused  Respiratory: Breathing comfortably on room air, decreased breath sounds in right lung (known effusion)  Abdomen: Tolerates palpation with minimal pain, PEG-tube site clean  Musculoskeletal: Emaciated, moving all extremities on examination  Skin: no Jaundice    Data   Recent Labs   Lab 08/31/21  0837 08/30/21  0533 08/28/21  0536 08/27/21  0731   WBC 9.6  --  11.5* 11.5*   HGB 7.8*  --  8.2* 8.3*   MCV 94  --  95 98     --  363 343   * 128* 128* 128*   POTASSIUM 4.6 4.3 4.3 3.9   CHLORIDE 90* 94 94 95   CO2 23 22 24 24   BUN 60* 43* 36* 23   CR 4.10* 2.92* 2.59* 1.94*   ANIONGAP 14 12 10 9   JANENE 9.0 9.2 8.9 8.8   * 120* 104* 117*   ALBUMIN 2.1*  --   --  2.1*   PROTTOTAL 9.1*  --   --  8.6   BILITOTAL 1.7*  --   --  1.5*   ALKPHOS 100  --   --  104   ALT 31  --   --  24   AST 50*  --   --  56*

## 2021-08-31 NOTE — PROGRESS NOTES
HEMODIALYSIS TREATMENT NOTE    Date: 8/31/2021  Time: 3:45 PM    Data:  Pre Wt: 70.1 kg (154 lb 8.7 oz)   Desired Wt: 68.1 kg   Post Wt: 69.1 kg (152 lb 5.4 oz)  Weight change: 1 kg  Ultrafiltration - Post Run Net Total Removed (mL): 1000 mL  Vascular Access Status: CVC  patent  Dialyzer Rinse: Streaked, Light  Total Blood Volume Processed: 65.65 L   Total Dialysis (Treatment) Time: 3   Dialysate Bath: K 2, Ca 2.5  Heparin: None    Lab:   No    Interventions:  Fluid goal titrated to support BP    Assessment:  Pt dialyzed for 3hrs on K 2, Ca 2.5 bath. Pt was vitally stable with sift BP and baseline tachycardia. A&Ox4. Lethargic. Slept for most of the tx. Tolerated 1L fluid removal. CVC site clean, dry, and intact. Max 400 BFR achieved with desired AP and . Blood rinsed back without issue. CVC lumens locked with saline and capped with clear guard.     Plan:    Per renal

## 2021-08-31 NOTE — PLAN OF CARE
"Assumed Cares: 9213-8750    VS: BP 98/47 (BP Location: Right arm)   Pulse 118   Temp 98.9  F (37.2  C) (Oral)   Resp 18   Ht 1.676 m (5' 6\")   Wt 70.1 kg (154 lb 8.7 oz)   SpO2 94%   BMI 24.94 kg/m    Neuro/Behavior: A&Ox4; calm cooperative  Pain: Pt c/o pain in   LDAs: 3l PICC line hep locked.   Resp: 3l NC, satting well  Cardiac: Tachycardic; denies chest pain   GI: no bm this shift. Denies nausea-TF restarted.   : on HD   Nutrition/endocrine:  Continuous TF 50 ml hr; reg diet.   Skin: Continuous TF 50 ml hr   Activity: self repositions; not oob but 2 assist lift.   Events: TF restarted this shift at goal rate. Denies nausea. Resting comfortably at this time.   Plan:  Continue to monitor and follow POC.     "

## 2021-09-01 LAB
ANION GAP SERPL CALCULATED.3IONS-SCNC: 12 MMOL/L (ref 3–14)
BUN SERPL-MCNC: 35 MG/DL (ref 7–30)
CALCIUM SERPL-MCNC: 9.4 MG/DL (ref 8.5–10.1)
CHLORIDE BLD-SCNC: 90 MMOL/L (ref 94–109)
CO2 SERPL-SCNC: 25 MMOL/L (ref 20–32)
CREAT SERPL-MCNC: 2.9 MG/DL (ref 0.66–1.25)
GFR SERPL CREATININE-BSD FRML MDRD: 28 ML/MIN/1.73M2
GLUCOSE BLD-MCNC: 106 MG/DL (ref 70–99)
LACTATE SERPL-SCNC: 1.8 MMOL/L (ref 0.7–2)
MAGNESIUM SERPL-MCNC: 2.3 MG/DL (ref 1.6–2.3)
PHOSPHATE SERPL-MCNC: 7.6 MG/DL (ref 2.5–4.5)
POTASSIUM BLD-SCNC: 3.6 MMOL/L (ref 3.4–5.3)
SODIUM SERPL-SCNC: 127 MMOL/L (ref 133–144)

## 2021-09-01 PROCEDURE — 250N000011 HC RX IP 250 OP 636: Performed by: INTERNAL MEDICINE

## 2021-09-01 PROCEDURE — 250N000013 HC RX MED GY IP 250 OP 250 PS 637: Performed by: INTERNAL MEDICINE

## 2021-09-01 PROCEDURE — 99207 PR CDG-CUT & PASTE-POTENTIAL IMPACT ON LEVEL: CPT | Performed by: PEDIATRICS

## 2021-09-01 PROCEDURE — 250N000013 HC RX MED GY IP 250 OP 250 PS 637: Performed by: PEDIATRICS

## 2021-09-01 PROCEDURE — 84100 ASSAY OF PHOSPHORUS: CPT | Performed by: INTERNAL MEDICINE

## 2021-09-01 PROCEDURE — 120N000002 HC R&B MED SURG/OB UMMC

## 2021-09-01 PROCEDURE — 83605 ASSAY OF LACTIC ACID: CPT | Performed by: PEDIATRICS

## 2021-09-01 PROCEDURE — C9113 INJ PANTOPRAZOLE SODIUM, VIA: HCPCS | Performed by: INTERNAL MEDICINE

## 2021-09-01 PROCEDURE — 36592 COLLECT BLOOD FROM PICC: CPT | Performed by: PEDIATRICS

## 2021-09-01 PROCEDURE — 250N000013 HC RX MED GY IP 250 OP 250 PS 637: Performed by: STUDENT IN AN ORGANIZED HEALTH CARE EDUCATION/TRAINING PROGRAM

## 2021-09-01 PROCEDURE — 99233 SBSQ HOSP IP/OBS HIGH 50: CPT | Performed by: PEDIATRICS

## 2021-09-01 PROCEDURE — 83735 ASSAY OF MAGNESIUM: CPT | Performed by: INTERNAL MEDICINE

## 2021-09-01 PROCEDURE — 80048 BASIC METABOLIC PNL TOTAL CA: CPT | Performed by: STUDENT IN AN ORGANIZED HEALTH CARE EDUCATION/TRAINING PROGRAM

## 2021-09-01 PROCEDURE — 99232 SBSQ HOSP IP/OBS MODERATE 35: CPT | Mod: GC | Performed by: INTERNAL MEDICINE

## 2021-09-01 PROCEDURE — 36592 COLLECT BLOOD FROM PICC: CPT | Performed by: STUDENT IN AN ORGANIZED HEALTH CARE EDUCATION/TRAINING PROGRAM

## 2021-09-01 RX ADMIN — OXYCODONE HYDROCHLORIDE 5 MG: 5 SOLUTION ORAL at 23:02

## 2021-09-01 RX ADMIN — ONDANSETRON 4 MG: 2 INJECTION INTRAMUSCULAR; INTRAVENOUS at 20:10

## 2021-09-01 RX ADMIN — SODIUM BICARBONATE 325 MG: 325 TABLET ORAL at 06:08

## 2021-09-01 RX ADMIN — HYDROMORPHONE HYDROCHLORIDE 1 MG: 1 SOLUTION ORAL at 08:39

## 2021-09-01 RX ADMIN — OXYCODONE HYDROCHLORIDE 5 MG: 5 SOLUTION ORAL at 15:13

## 2021-09-01 RX ADMIN — PANCRELIPASE 2 CAPSULE: 24000; 76000; 120000 CAPSULE, DELAYED RELEASE PELLETS ORAL at 02:42

## 2021-09-01 RX ADMIN — PANCRELIPASE 2 CAPSULE: 24000; 76000; 120000 CAPSULE, DELAYED RELEASE PELLETS ORAL at 17:57

## 2021-09-01 RX ADMIN — Medication 1 PACKET: at 08:41

## 2021-09-01 RX ADMIN — Medication 5 ML: at 17:56

## 2021-09-01 RX ADMIN — THIAMINE HCL TAB 100 MG 100 MG: 100 TAB at 08:39

## 2021-09-01 RX ADMIN — PANCRELIPASE 2 CAPSULE: 24000; 76000; 120000 CAPSULE, DELAYED RELEASE PELLETS ORAL at 06:08

## 2021-09-01 RX ADMIN — MELATONIN TAB 3 MG 6 MG: 3 TAB at 23:01

## 2021-09-01 RX ADMIN — Medication 5 ML: at 08:39

## 2021-09-01 RX ADMIN — Medication 1 PACKET: at 19:59

## 2021-09-01 RX ADMIN — SODIUM BICARBONATE 325 MG: 325 TABLET ORAL at 17:57

## 2021-09-01 RX ADMIN — Medication 1 PACKET: at 14:38

## 2021-09-01 RX ADMIN — PANCRELIPASE 1 CAPSULE: 24000; 76000; 120000 CAPSULE, DELAYED RELEASE PELLETS ORAL at 11:01

## 2021-09-01 RX ADMIN — PANCRELIPASE 2 CAPSULE: 24000; 76000; 120000 CAPSULE, DELAYED RELEASE PELLETS ORAL at 23:01

## 2021-09-01 RX ADMIN — OXYCODONE HYDROCHLORIDE 5 MG: 5 SOLUTION ORAL at 06:09

## 2021-09-01 RX ADMIN — HYDROMORPHONE HYDROCHLORIDE 1 MG: 1 SOLUTION ORAL at 19:57

## 2021-09-01 RX ADMIN — ONDANSETRON 4 MG: 2 INJECTION INTRAMUSCULAR; INTRAVENOUS at 06:08

## 2021-09-01 RX ADMIN — FOLIC ACID 1 MG: 1 TABLET ORAL at 08:40

## 2021-09-01 RX ADMIN — PANTOPRAZOLE SODIUM 40 MG: 40 INJECTION, POWDER, FOR SOLUTION INTRAVENOUS at 08:39

## 2021-09-01 RX ADMIN — HYDROMORPHONE HYDROCHLORIDE 1 MG: 1 SOLUTION ORAL at 02:43

## 2021-09-01 RX ADMIN — SODIUM BICARBONATE 325 MG: 325 TABLET ORAL at 02:42

## 2021-09-01 RX ADMIN — HYDROMORPHONE HYDROCHLORIDE 1 MG: 1 SOLUTION ORAL at 13:10

## 2021-09-01 RX ADMIN — SODIUM BICARBONATE 325 MG: 325 TABLET ORAL at 11:01

## 2021-09-01 RX ADMIN — SODIUM BICARBONATE 325 MG: 325 TABLET ORAL at 23:01

## 2021-09-01 ASSESSMENT — ACTIVITIES OF DAILY LIVING (ADL)
ADLS_ACUITY_SCORE: 19
ADLS_ACUITY_SCORE: 17
ADLS_ACUITY_SCORE: 19
ADLS_ACUITY_SCORE: 19

## 2021-09-01 ASSESSMENT — MIFFLIN-ST. JEOR: SCORE: 1527.75

## 2021-09-01 NOTE — PLAN OF CARE
"Blood pressure 93/40, pulse 116, temperature 96.8  F (36  C), temperature source Oral, resp. rate 16, height 1.676 m (5' 6\"), weight 63 kg (138 lb 14.2 oz), SpO2 93 %. Via 4L NC .    Patient A & O X 4 with flat affect VSS with soft BP.  Poor po intake . G tube open to gravity, J tube continuous tube feeding at goal rate of 45 mL/hr, PEG dressing  CDI ,Left PICC patent,  Right CVC  dressing CDI . Patient sleeping between care and repositioned as tolerated . Call light within reach. Continue monitor closely and update MD with change.      "

## 2021-09-01 NOTE — PROGRESS NOTES
[facility]    GASTROENTEROLOGY PROGRESS NOTE    ASSESSMENT/RECOMMENDATIONS:  30 year old male with a history of alcohol use disorder who presented to OSH 5/19 for abdominal pain, nausea and vomiting, and found to have alcohol hepatitis, ESLD, RADHA on HD, septic shock requiring pressors as well as necrotizing pancreatitis with large evolving necrotic collection. Transferred to [facility] for consideration of drainage of presumed infected necrotic collection. S/p EUS transluminal and percutaneous drainage.    #. Acute necrotizing pancreatitis with presumed infected walled off necrotic collection s/p percutaneous and endoscopic drainage   #. Septic shock, resolved  #. Leukocytosis, resolved  #. Ascites/Peritonitis  Unclear evolution of pancreatitis history but had a recent CT scan with large evolving necrotic collection, presumed infected. No obvious gas in collection. CT on admission to [facility] showing mature collection with enhancing wall measuring ~[id number]. With profound coagulopathy (liver disease + malnutrition +sepsis), INR 3.8 on transfer he was given FFP and Vit K, IR placed perc drain 6/29. Repeat imaging 7/18 with still large walled off necrotic collection that appeared amenable to endoscopic transluminal drainage, underwent cystgastrostomy stenting on 7/21/21, extensive debridement/necrosectomy 8/6, final near complete/suffiecient debridement with evacuation of solid necrosis in right side of cavity 8/11,two 10 x 1 cm Solus stents placed across cavity, no current plans for future necrosectomies.  Etiology: alcohol  Date of onset: 5/19  BISAP score on admission: unclear  Concurrent organ failure: respiratory (now extubated), renal (on HD), liver, cardiogenic (weaned off pressors 6/29)  Thromboses: none  Diabetes: no  Current nutrition access: PEG-J + po diet  Last imaging: CT scan with IV contrast 8/23  Infection/antiinfectives:                 - Meropenem (6/29 - 7/18),  Ciprofloxacin (7/19 - 7/21), Vancomycin (6/29 - 6/30), Micafungin (6/29 - 7/1), Ceftriaxone (7/21 - 7/23), Linezolid (7/21 - 7/30), Zosyn (7/27-8/6), Cefepime (8/6 - present), Metronidazole (8/6 - present)    Intervention:                 6/30 - Left IR RP perc drain (24F)                 7/2 - Diagnostic paracentesis (WBC 2663 - 74% PMN, SAAG <1.1, protein 4.1)                  7/5 - Repeat Paracentesis (WBC 1801 - 61% PMN, lipase 48)                 7/13 - Diag/therapeutic paracentesis ()                 7/13 - PEG-J placement with T-tag gastropexy                 7/21 - Repeat paracentesis                 7/21 - EUS-cystgastrostomy, Axios with co-axial Viabil                 7/28 - EGD, stent replacement (transluminal Axios with coaxial Solus), PEG-J removal (buried bumper) and replacement of new PEG-J with one new T-tag                 8/6 - EGD with necrosectomy and near complete necrosectomy                 8/11 - EGD with completion necrosectomy, no e/o bowel fistula     8/25 - IR perc drain removed     -- No further necrosectomies anticipated at this time  -- Analgesics and anti-emetics per primary team    #. Liver abscess vs infarct: completed antibiotics    #. Severe malnutrition in the context of chronic illness  #. Gastric outlet obstruction s/p PEG-J placement  #. Concern for bowel fistula  Patient with very poor oral intake over the course of his illness and was not meeting calorie requirements orally. S/p PEG-J with T-tag gastropexy (x3) 7/13.   -- Continue jejunostomy feeding  -- Continue tube feeding with creon  -- Diet as tolerated      Panc/bili GI team will follow peripherally, but will not see daily. Please call with status changes or questions.    GI Follow up: Clinic visit with Dr. Chacko on 9/30/21; scheduled     The patient was discussed and plan agreed upon with GI staff, Dr. Ginna Gore MD  GI  "Fellow  [phone]  _______________________________________________________________  S:   - 24 hour events and nursing notes reviewed  - Denies nausea this AM, but had an episode of emesis on 9/1 at 9:30a  - Ongoing abdominal pain, about 6/10  - Minimal walking/no walking outside his room  - Denies fevers or chills    O:  Blood pressure 101/51, pulse 112, temperature (!) 95.3  F (35.2  C), temperature source Axillary, resp. rate 20, height 1.676 m (5' 6\"), weight 65 kg (143 lb 4.8 oz), SpO2 91 %.  Gen: Laying in bed. Extremely malnourished and cachectic appearing  HEENT: Conjunctiva anicteric, poor dentition  Resp: non labored breathing on ambient air  Abd: mild distention, non tender throughout. PEG-J in place, c/d/i  Skin: no jaundice  Ext: warm, well perfused. HD catheter in place, right upper chest.   Mental status/Psych: alert and oriented. No asterixis    LABS:  BMP  Recent Labs   Lab 09/01/21  0646 08/31/21  0837 08/30/21  0533 08/28/21  0536   * 127* 128* 128*   POTASSIUM 3.6 4.6 4.3 4.3   CHLORIDE 90* 90* 94 94   JANENE 9.4 9.0 9.2 8.9   CO2 25 23 22 24   BUN 35* 60* 43* 36*   CR 2.90* 4.10* 2.92* 2.59*   * 126* 120* 104*     CBC  Recent Labs   Lab 08/31/21  0837 08/28/21  0536 08/27/21  0731 08/26/21  0657   WBC 9.6 11.5* 11.5* 11.9*   RBC 2.63* 2.77* 2.80* 2.88*   HGB 7.8* 8.2* 8.3* 8.6*   HCT 24.7* 26.2* 27.3* 27.5*   MCV 94 95 98 96   MCH 29.7 29.6 29.6 29.9   MCHC 31.6 31.3* 30.4* 31.3*   RDW 16.7* 17.1* 17.4* 17.2*    363 343 365     LFTs  Recent Labs   Lab 08/31/21  0837 08/27/21  0731 08/26/21  0657   ALKPHOS 100 104 108   AST 50* 56* 48*   ALT 31 24 23   BILITOTAL 1.7* 1.5* 1.6*   PROTTOTAL 9.1* 8.6 8.7   ALBUMIN 2.1* 2.1* 1.7*      IMAGING:  No imaging in the last 24 hours    "

## 2021-09-01 NOTE — PLAN OF CARE
Assistance of 1 for moving in bed, self shifting in bed. Bowel movement x2, PRN Dilaudid x2 for back pain. 300mL bout of emesis at beginning of shift and bout of 200 around 1500, tube feeding stopped for 1 hr to provide relief with first bout. Sleeping between cares. G tube open to gravity, J tube continuous tube feeding at goal rate of 45 mL/hr, PEG dressing changed. Left PICC patent, CVC intact. VSS on 4L nasal cannula.

## 2021-09-01 NOTE — PLAN OF CARE
"[facility] cares [id number]    Pain: Pt consistently rating pain at 7/10 despite constant intervention. States this is \"average\" pain level  Neuro: A&Ox4, pt withdrawn and apathetic to care plan   Respiratory: Lung sounds diminished, infrequent cough  Cardiac/Neurovascular: Tachycardia   GI/: No BM, pt on HD  Nutrition: Regular, no appetite. TF at goal of 45 mL/hour  Activity: Ax1 to turn, lift to get OOB   Skin: Dry oral area, cracked/peeling heels, coccyx blanchable redness, GJ tube   Access:  L Triple lumen PICC    Events this shift: No acute events this shift. Pt refusing repositioning. Treating pain per plan. G tube to gravity bag, J tube infusing TF at 45mL/hour, 20 mL q1h water flushes, pt tolerating well. Pt very staff seeking.     Plan: Continue with plan of care. Update provider about any changes in patient status.     "

## 2021-09-01 NOTE — PROGRESS NOTES
[facility]    Medicine Progress Note - Hospitalist Service, Daniela 3       Date of Admission:  6/28/2021    Assessment & Plan         Dax Villareal is a 30 year old male with hx of alcohol use disorder admitted on 6/28/2021 after recent prolonged admission at [facility] in [place] who has severe acute alcoholic hepatitis c/b HE, ESRD requiring HD, and malnutrition in the setting of acute necrotizing pancreatitis, s/p PEG-J placement on 7/13. He arrived with acute respiratory failure and septic shock that have resolved, and his secondary bacterial peritonitis has been appropriately treated. He requires continued management for ESRD, hepatic encephalopathy, infected pancreatic pseudocyst, nutritional support via PEG-J, and persistent right pleural effusion. Clinically improving after multiple necrosectomies.     Today:   - Working closely with CM and SW concerning placement; [place]     #Septic Shock  #Necrotizing alcoholic pancreatitis w/ infected pseudocyst  #Secondary Bacterial Peritonitis 2/2 infected pseudocyst with VRE  Admitted to the [facility] ICU on 6/28/2021 from [facility] in [place] for septic shock due to necrotizing pancreatitis. CT on admit showing mature collection with enhancing wall measuring ~[id number]. IR placed perc drain 6/29. Repeat imaging 7/18 revealed walled off necrotic collection amenable to endoscopic transluminal drainage with cystgastrostomy stenting with necrosectomy on 7/21/21. Complete necrosectomy on 8/11. Antibiotics (cefepime and metronidazole) were discontinued on 8/25 after left flank drain removed. Patient has remained stable.   BCx: NGTD since 5/18/2021     #Gastric Outlet Obstruction  High G tube output due to the cystogastric connection/fluid drainage noted on 8/5. Per GI, possibly related to gastric outlet from severe abdominal inflammation and is expected. KUB  negative for obstructive gas pattern. Continues to have moderate NG output - stable.      #Hyponatremia, stable  Patient with down-trending sodium the week of 8/27; likely due to substantial NG output and low solute intake and hepatic dysfunction. Stable     #Right Pancreatic Hydrothorax, stable  Increased dyspnea with CXR on 7/19 with further imaging and thoracentesis confirming a hepatic/pancreatic hydrothorax. Thoracentesis PRN for dyspnea.     #Alcoholic Hepatitis  #Coagulation Defect  Received 1 week of steroids at [facility] for elevated INR and altered mental status. Not a liver transplant candidate. Mental status improved with aggressive lactulose and rifaxamin, which was discontinued without return of AMS.     #Hepatic Fluid collection vs Abscess  CT Ab/pelvis from 8/13 and 8/21 showing a 3.0 x 2.7 cm hepatic fluid collection. IR consulted and recommended continue ABx without intervention until drain removed. CT imaging on 8/23 shows fluid collection reduced in size measuring 2 x 2.1cm.      ESRD due to ATN  Stage III RADHA due to ATN from septic shock without recovery for >3 months. Line is TDC from [facility]. Nephrology dialyzed on Tu, Th, Sa schedule.  - Will need follow-up and outpatient dialysis     #Anemia due to Chronic Illness  #Hematochezia, resolved  #Hemoperitoneum  Baseline hgb 17. Running between 6-8 in setting of chronic illness, new ESRD with epocrit per Nephrology, frequent lab draws and procedures. Hematochezia on 8/8 after starting standard intensity heparin, now resolved. Paracentesis on 8/21 with hemoperitoneum. No active bleeding.   - Goal hgb > 7.0, trend Hgb     #Alcohol Use Disorder  Counseled patient on complete alcohol cessation. Not interested in additional services at this time. Continue daily folic acid and thiamine.     #Lactic Acidosis  Intermittent Lactic acidosis in the setting of sepsis, ESRD, bleeding.     #Hypokalemia  Noted in the setting of high gastric output and improved once  G-tube output decreased. CTM.      #Severe Malnutrition  Poor oral intake in the setting of necrotizing pancreatitis. PEG tube placed 7/13. Dietitian consulted and continuous tube feeds with goal of 65 ml/hr with continuous pancreatic enzymes  - Continuous tube feeds decreased to 50 mL overnight.       Diet: Regular Diet Adult  Adult Formula Drip Feeding: Continuous Novasource Renal; Jejunostomy; Goal Rate: 45; mL/hr; Medication - Feeding Tube Flush Frequency: At least 15-30 mL water before and after medication administration and with tube clogging; Amount to Send (Nutri...    DVT Prophylaxis: Pneumatic Compression Devices  Rdz Catheter: Not present  Central Lines: PRESENT  PICC Triple Lumen 06/28/21 Left-Site Assessment: WDL  CVC Double Lumen Right-Site Assessment: WDL  Code Status: Full Code      Disposition Plan   Expected discharge: 09/03/2021   recommended to transitional care unit once safe disposition plan/ TCU bed available.     The patient's care was discussed with the Bedside Nurse, Care Coordinator/ and Patient.    Raegan Joshua MD  Hospitalist Service, [address]  Securely message with the Vocera Web Console (learn more here)  Text page via [employer] Paging/Directory  Please see sign in/sign out for up to date coverage information    Clinically Significant Risk Factors Present on Admission                ______________________________________________________________________    Interval History   RN notes reviewed.  Dax had some nausea and an episode of emesis this morning but after a 1 hour hold of feeds was able to restart with no additional issues.  He has no new complaints or concerns today.    The remainder of a 4 point ROS is negative unless otherwise noted above.    Data reviewed today: I reviewed all medications, new labs and imaging results over the last 24 hours.    Physical Exam   Vital Signs: Temp: (!) 95.3  F (35.2  C) Temp  src: Axillary BP: 101/51 Pulse: 112   Resp: 20 SpO2: 91 % O2 Device: Nasal cannula Oxygen Delivery: 4 LPM  Weight: 143 lbs 4.78 oz  Gen: Awake, alert, in no acute distress laying in bed  HEENT: NC/AT, sclera anicteric  CV: Regular rate and rhythm  Resp: Non-labored breathing on room air, diminished breath sounds over right hemithorax as per previous.  Abd: soft, mild tenderness to palpation throughout, PEG site c/d/i  Extrem: Warm and well perfused with no edema.

## 2021-09-01 NOTE — PROVIDER NOTIFICATION
Provider notified (name): Jayne Olea MD ([id number])  Reason for notification: pt had bout of 300 ml emesis  Recommendation/request given to provider: general update on status  Response from provider: pause tube feed for 1 hr, then restart at previous rate

## 2021-09-01 NOTE — PROGRESS NOTES
"Care Management Follow Up    Length of Stay (days): 65    Expected Discharge Date: TBD     Concerns to be Addressed: discharge planning     Patient plan of care discussed at interdisciplinary rounds: Yes    Anticipated Discharge Disposition: Skilled Nursing Facilty, Transitional Care     Anticipated Discharge Services:  (New outpatient dialysis)  Anticipated Discharge DME: None    Patient/family educated on Medicare website which has current facility and service quality ratings: no (Pt requesting TCU in or near [place])  Education Provided on the Discharge Plan: Yes  Patient/Family in Agreement with the Plan: yes    Referrals Placed by CM/SW:      OP dialysis   [facility] OP HD unit- received call from [facility] in admissions (ph: [phone]) that their medical director reviewed pt and does not feel he is appropriate for outpatient dialysis due to level of acuity/liver disease.     TCU/LTACH  [address] [address]  Ph: [phone]  -RNCC spoke with liadedrick Kay, who stated that pt would meet clinical criteria for LTACH but his insurance is OON.     Private pay costs discussed: Not applicable    Additional Information:  SW following for discharge placement. Received updates from referral sources noted above. At this time, the primary barrier to placement is pt's insurance, which is only in network in [facility]. SW attempted to meet with pt in his room to discuss insurance status. Pt appeared sleepy, but did confirm he has not officially moved to [place] but \"I have everything lined up\". Pt does not know his new address. Per CHELSIE Vera financial counselor, pt will need to update his address with [facility] before they can change his Medical Assistance plan, and even when he does this, it may not take effect until 10/1. At this pont, pt's hospital admission has not been covered since 7/31 and there is some question of transferring pt to an in network hospital in " [place] [place].     SW will continue to follow for discharge placement.     RONNA Delaney, [facility]  [address]   [facility]  Phone: [phone]  Pager: [phone]

## 2021-09-02 ENCOUNTER — APPOINTMENT (OUTPATIENT)
Dept: GENERAL RADIOLOGY | Facility: CLINIC | Age: 31
End: 2021-09-02
Attending: PHYSICIAN ASSISTANT
Payer: MEDICAID

## 2021-09-02 ENCOUNTER — APPOINTMENT (OUTPATIENT)
Dept: CT IMAGING | Facility: CLINIC | Age: 31
End: 2021-09-02
Attending: INTERNAL MEDICINE
Payer: MEDICAID

## 2021-09-02 LAB
ALBUMIN SERPL-MCNC: 2.7 G/DL (ref 3.4–5)
ALP SERPL-CCNC: 120 U/L (ref 40–150)
ALT SERPL W P-5'-P-CCNC: 40 U/L (ref 0–70)
ANION GAP SERPL CALCULATED.3IONS-SCNC: 18 MMOL/L (ref 3–14)
AST SERPL W P-5'-P-CCNC: 57 U/L (ref 0–45)
BASE EXCESS BLDV CALC-SCNC: -2.3 MMOL/L (ref -7.7–1.9)
BILIRUB SERPL-MCNC: 2.2 MG/DL (ref 0.2–1.3)
BUN SERPL-MCNC: 68 MG/DL (ref 7–30)
CALCIUM SERPL-MCNC: 9.8 MG/DL (ref 8.5–10.1)
CHLORIDE BLD-SCNC: 85 MMOL/L (ref 94–109)
CO2 SERPL-SCNC: 21 MMOL/L (ref 20–32)
CREAT SERPL-MCNC: 4.7 MG/DL (ref 0.66–1.25)
ERYTHROCYTE [DISTWIDTH] IN BLOOD BY AUTOMATED COUNT: 16.6 % (ref 10–15)
GFR SERPL CREATININE-BSD FRML MDRD: 15 ML/MIN/1.73M2
GLUCOSE BLD-MCNC: 148 MG/DL (ref 70–99)
HCO3 BLDV-SCNC: 23 MMOL/L (ref 21–28)
HCT VFR BLD AUTO: 29.2 % (ref 40–53)
HGB BLD-MCNC: 9.3 G/DL (ref 13.3–17.7)
HOLD SPECIMEN: NORMAL
LACTATE SERPL-SCNC: 1.6 MMOL/L (ref 0.7–2)
LACTATE SERPL-SCNC: 2.8 MMOL/L (ref 0.7–2)
LACTATE SERPL-SCNC: 3 MMOL/L (ref 0.7–2)
LIPASE SERPL-CCNC: 251 U/L (ref 73–393)
MAGNESIUM SERPL-MCNC: 2.7 MG/DL (ref 1.6–2.3)
MCH RBC QN AUTO: 29.9 PG (ref 26.5–33)
MCHC RBC AUTO-ENTMCNC: 31.8 G/DL (ref 31.5–36.5)
MCV RBC AUTO: 94 FL (ref 78–100)
O2/TOTAL GAS SETTING VFR VENT: 5 %
OXYHGB MFR BLDV: 60 % (ref 70–75)
PCO2 BLDV: 43 MM HG (ref 40–50)
PH BLDV: 7.34 [PH] (ref 7.32–7.43)
PHOSPHATE SERPL-MCNC: 9.2 MG/DL (ref 2.5–4.5)
PLATELET # BLD AUTO: 386 10E3/UL (ref 150–450)
PO2 BLDV: 39 MM HG (ref 25–47)
POTASSIUM BLD-SCNC: 3.5 MMOL/L (ref 3.4–5.3)
PROT SERPL-MCNC: 10.7 G/DL (ref 6.8–8.8)
RBC # BLD AUTO: 3.11 10E6/UL (ref 4.4–5.9)
SODIUM SERPL-SCNC: 124 MMOL/L (ref 133–144)
WBC # BLD AUTO: 21.4 10E3/UL (ref 4–11)

## 2021-09-02 PROCEDURE — G0463 HOSPITAL OUTPT CLINIC VISIT: HCPCS

## 2021-09-02 PROCEDURE — 36592 COLLECT BLOOD FROM PICC: CPT | Performed by: STUDENT IN AN ORGANIZED HEALTH CARE EDUCATION/TRAINING PROGRAM

## 2021-09-02 PROCEDURE — 85027 COMPLETE CBC AUTOMATED: CPT | Performed by: PHYSICIAN ASSISTANT

## 2021-09-02 PROCEDURE — 250N000013 HC RX MED GY IP 250 OP 250 PS 637: Performed by: INTERNAL MEDICINE

## 2021-09-02 PROCEDURE — 250N000011 HC RX IP 250 OP 636: Performed by: INTERNAL MEDICINE

## 2021-09-02 PROCEDURE — 36592 COLLECT BLOOD FROM PICC: CPT

## 2021-09-02 PROCEDURE — 84100 ASSAY OF PHOSPHORUS: CPT | Performed by: INTERNAL MEDICINE

## 2021-09-02 PROCEDURE — 99232 SBSQ HOSP IP/OBS MODERATE 35: CPT | Mod: 24 | Performed by: INTERNAL MEDICINE

## 2021-09-02 PROCEDURE — 258N000003 HC RX IP 258 OP 636: Performed by: STUDENT IN AN ORGANIZED HEALTH CARE EDUCATION/TRAINING PROGRAM

## 2021-09-02 PROCEDURE — 82805 BLOOD GASES W/O2 SATURATION: CPT

## 2021-09-02 PROCEDURE — 83605 ASSAY OF LACTIC ACID: CPT | Performed by: STUDENT IN AN ORGANIZED HEALTH CARE EDUCATION/TRAINING PROGRAM

## 2021-09-02 PROCEDURE — 87040 BLOOD CULTURE FOR BACTERIA: CPT | Performed by: PHYSICIAN ASSISTANT

## 2021-09-02 PROCEDURE — C9113 INJ PANTOPRAZOLE SODIUM, VIA: HCPCS | Performed by: INTERNAL MEDICINE

## 2021-09-02 PROCEDURE — 250N000013 HC RX MED GY IP 250 OP 250 PS 637: Performed by: STUDENT IN AN ORGANIZED HEALTH CARE EDUCATION/TRAINING PROGRAM

## 2021-09-02 PROCEDURE — 250N000013 HC RX MED GY IP 250 OP 250 PS 637: Performed by: PEDIATRICS

## 2021-09-02 PROCEDURE — 36592 COLLECT BLOOD FROM PICC: CPT | Performed by: INTERNAL MEDICINE

## 2021-09-02 PROCEDURE — 82040 ASSAY OF SERUM ALBUMIN: CPT | Performed by: PHYSICIAN ASSISTANT

## 2021-09-02 PROCEDURE — 74177 CT ABD & PELVIS W/CONTRAST: CPT | Mod: 26 | Performed by: RADIOLOGY

## 2021-09-02 PROCEDURE — 80053 COMPREHEN METABOLIC PANEL: CPT | Performed by: PHYSICIAN ASSISTANT

## 2021-09-02 PROCEDURE — 250N000011 HC RX IP 250 OP 636: Performed by: STUDENT IN AN ORGANIZED HEALTH CARE EDUCATION/TRAINING PROGRAM

## 2021-09-02 PROCEDURE — 250N000011 HC RX IP 250 OP 636: Performed by: CLINICAL NURSE SPECIALIST

## 2021-09-02 PROCEDURE — 71045 X-RAY EXAM CHEST 1 VIEW: CPT | Mod: 26 | Performed by: RADIOLOGY

## 2021-09-02 PROCEDURE — 120N000002 HC R&B MED SURG/OB UMMC

## 2021-09-02 PROCEDURE — 83605 ASSAY OF LACTIC ACID: CPT | Performed by: ORTHOPAEDIC SURGERY

## 2021-09-02 PROCEDURE — 99233 SBSQ HOSP IP/OBS HIGH 50: CPT | Mod: GC | Performed by: PEDIATRICS

## 2021-09-02 PROCEDURE — 36592 COLLECT BLOOD FROM PICC: CPT | Performed by: PHYSICIAN ASSISTANT

## 2021-09-02 PROCEDURE — 83735 ASSAY OF MAGNESIUM: CPT | Performed by: INTERNAL MEDICINE

## 2021-09-02 PROCEDURE — 74018 RADEX ABDOMEN 1 VIEW: CPT | Mod: 26 | Performed by: RADIOLOGY

## 2021-09-02 PROCEDURE — 74018 RADEX ABDOMEN 1 VIEW: CPT

## 2021-09-02 PROCEDURE — 71045 X-RAY EXAM CHEST 1 VIEW: CPT

## 2021-09-02 PROCEDURE — 36592 COLLECT BLOOD FROM PICC: CPT | Performed by: ORTHOPAEDIC SURGERY

## 2021-09-02 PROCEDURE — 83690 ASSAY OF LIPASE: CPT | Performed by: PHYSICIAN ASSISTANT

## 2021-09-02 PROCEDURE — 83605 ASSAY OF LACTIC ACID: CPT | Performed by: PHYSICIAN ASSISTANT

## 2021-09-02 PROCEDURE — 99207 PR CDG-CUT & PASTE-POTENTIAL IMPACT ON LEVEL: CPT | Performed by: PEDIATRICS

## 2021-09-02 PROCEDURE — 74177 CT ABD & PELVIS W/CONTRAST: CPT

## 2021-09-02 PROCEDURE — 250N000011 HC RX IP 250 OP 636: Performed by: PEDIATRICS

## 2021-09-02 RX ORDER — SEVELAMER CARBONATE FOR ORAL SUSPENSION 800 MG/1
0.8 POWDER, FOR SUSPENSION ORAL
Status: DISCONTINUED | OUTPATIENT
Start: 2021-09-02 | End: 2021-09-05 | Stop reason: ALTCHOICE

## 2021-09-02 RX ORDER — TRANSGALACTOOLIGOSACCHARIDES 2.75 G
1 POWDER IN PACKET (EA) ORAL DAILY
Status: DISCONTINUED | OUTPATIENT
Start: 2021-09-02 | End: 2021-10-12 | Stop reason: HOSPADM

## 2021-09-02 RX ORDER — CEFEPIME HYDROCHLORIDE 1 G/1
1 INJECTION, POWDER, FOR SOLUTION INTRAMUSCULAR; INTRAVENOUS EVERY 24 HOURS
Status: COMPLETED | OUTPATIENT
Start: 2021-09-02 | End: 2021-09-08

## 2021-09-02 RX ORDER — CEFEPIME HYDROCHLORIDE 1 G/1
1 INJECTION, POWDER, FOR SOLUTION INTRAMUSCULAR; INTRAVENOUS EVERY 24 HOURS
Status: DISCONTINUED | OUTPATIENT
Start: 2021-09-02 | End: 2021-09-02

## 2021-09-02 RX ORDER — LIDOCAINE 40 MG/G
CREAM TOPICAL
Status: DISCONTINUED | OUTPATIENT
Start: 2021-09-02 | End: 2021-09-03

## 2021-09-02 RX ORDER — ONDANSETRON 2 MG/ML
4 INJECTION INTRAMUSCULAR; INTRAVENOUS ONCE
Status: COMPLETED | OUTPATIENT
Start: 2021-09-02 | End: 2021-09-02

## 2021-09-02 RX ORDER — IOPAMIDOL 755 MG/ML
84 INJECTION, SOLUTION INTRAVASCULAR ONCE
Status: COMPLETED | OUTPATIENT
Start: 2021-09-02 | End: 2021-09-02

## 2021-09-02 RX ADMIN — HYDROMORPHONE HYDROCHLORIDE 1 MG: 1 SOLUTION ORAL at 01:17

## 2021-09-02 RX ADMIN — PANCRELIPASE 1 CAPSULE: 24000; 76000; 120000 CAPSULE, DELAYED RELEASE PELLETS ORAL at 11:41

## 2021-09-02 RX ADMIN — PANCRELIPASE 2 CAPSULE: 24000; 76000; 120000 CAPSULE, DELAYED RELEASE PELLETS ORAL at 02:57

## 2021-09-02 RX ADMIN — FOLIC ACID 1 MG: 1 TABLET ORAL at 08:14

## 2021-09-02 RX ADMIN — SODIUM CHLORIDE 1000 ML: 9 INJECTION, SOLUTION INTRAVENOUS at 18:59

## 2021-09-02 RX ADMIN — Medication 1 PACKET: at 21:58

## 2021-09-02 RX ADMIN — ONDANSETRON 4 MG: 2 INJECTION INTRAMUSCULAR; INTRAVENOUS at 02:58

## 2021-09-02 RX ADMIN — MELATONIN TAB 3 MG 6 MG: 3 TAB at 21:57

## 2021-09-02 RX ADMIN — OXYCODONE HYDROCHLORIDE 5 MG: 5 SOLUTION ORAL at 22:21

## 2021-09-02 RX ADMIN — IOPAMIDOL 84 ML: 755 INJECTION, SOLUTION INTRAVENOUS at 20:09

## 2021-09-02 RX ADMIN — Medication 5 ML: at 08:15

## 2021-09-02 RX ADMIN — Medication 5 ML: at 05:38

## 2021-09-02 RX ADMIN — PANCRELIPASE 1 CAPSULE: 24000; 76000; 120000 CAPSULE, DELAYED RELEASE PELLETS ORAL at 21:57

## 2021-09-02 RX ADMIN — SODIUM BICARBONATE 325 MG: 325 TABLET ORAL at 18:20

## 2021-09-02 RX ADMIN — METRONIDAZOLE 500 MG: 500 INJECTION, SOLUTION INTRAVENOUS at 21:57

## 2021-09-02 RX ADMIN — Medication 1 PACKET: at 08:17

## 2021-09-02 RX ADMIN — OXYCODONE HYDROCHLORIDE 5 MG: 5 SOLUTION ORAL at 14:55

## 2021-09-02 RX ADMIN — ONDANSETRON 4 MG: 2 INJECTION INTRAMUSCULAR; INTRAVENOUS at 14:56

## 2021-09-02 RX ADMIN — SODIUM BICARBONATE 325 MG: 325 TABLET ORAL at 11:40

## 2021-09-02 RX ADMIN — OXYCODONE HYDROCHLORIDE 5 MG: 5 SOLUTION ORAL at 08:14

## 2021-09-02 RX ADMIN — HYDROMORPHONE HYDROCHLORIDE 1 MG: 1 SOLUTION ORAL at 18:15

## 2021-09-02 RX ADMIN — THIAMINE HCL TAB 100 MG 100 MG: 100 TAB at 08:13

## 2021-09-02 RX ADMIN — CALCIUM CARBONATE (ANTACID) CHEW TAB 500 MG 500 MG: 500 CHEW TAB at 01:22

## 2021-09-02 RX ADMIN — PANCRELIPASE 1 CAPSULE: 24000; 76000; 120000 CAPSULE, DELAYED RELEASE PELLETS ORAL at 18:20

## 2021-09-02 RX ADMIN — HYDROMORPHONE HYDROCHLORIDE 1 MG: 1 SOLUTION ORAL at 11:40

## 2021-09-02 RX ADMIN — HYDROXYZINE HYDROCHLORIDE 50 MG: 25 TABLET, FILM COATED ORAL at 21:58

## 2021-09-02 RX ADMIN — SODIUM BICARBONATE 325 MG: 325 TABLET ORAL at 21:57

## 2021-09-02 RX ADMIN — CEFEPIME HYDROCHLORIDE 1 G: 1 INJECTION, POWDER, FOR SOLUTION INTRAMUSCULAR; INTRAVENOUS at 20:54

## 2021-09-02 RX ADMIN — SODIUM BICARBONATE 325 MG: 325 TABLET ORAL at 02:57

## 2021-09-02 RX ADMIN — PANTOPRAZOLE SODIUM 40 MG: 40 INJECTION, POWDER, FOR SOLUTION INTRAVENOUS at 08:15

## 2021-09-02 ASSESSMENT — ACTIVITIES OF DAILY LIVING (ADL)
ADLS_ACUITY_SCORE: 17

## 2021-09-02 ASSESSMENT — PAIN DESCRIPTION - DESCRIPTORS
DESCRIPTORS: ACHING;SORE
DESCRIPTORS: ACHING;SORE

## 2021-09-02 ASSESSMENT — MIFFLIN-ST. JEOR: SCORE: 1522.75

## 2021-09-02 NOTE — PLAN OF CARE
"Blood pressure (!) 98/38, pulse 116, temperature 96.8  F (36  C), temperature source Oral, resp. rate 16, height 1.676 m (5' 6\"), weight 62.5 kg (137 lb 12.6 oz), SpO2 91 %. Via 4L NC .    Patient A & O X 4 VS short of breath with activity, soft BP , and trigger sepsis protocol lactic acid order . Patient had HD ordered ,  and want down around 12;00 PM . Had emesis at Dialysis , and did not have HD run patient was returned to unit . Patient upon arrival c/o back pain and request for pain meds . Writer given scheduled Oxycodone and has order Zofran IV X 1 post assessment no emesis . Patient did not eat. G tube open to gravity, J tube continuous tube feeding at goal rate of 45 mL/hr, PEG dressing, changed by WOC, ,Left PICC patent,  Right CVC  dressing CDI . Patient sleeps between care  Assist x 2 with bedpan and had bowel movement x 2 .  Call light within reach bed alarm on and hourly round completed . Continue monitor closely and update MD with change.      "

## 2021-09-02 NOTE — PROGRESS NOTES
WO Nurse Inpatient Wound Assessment   Reason for consultation: Evaluate and treat  Peg tube wounds    Assessment  Peg tube wounds due to Moisture Associated Skin Damage (MASD)  Status: healing, now hypergranular tissue present, continues to have hypergranular tissue, no improvement with hydrofera blue noted, requested silver nitrate, not available at this time, Will treat tomorrow (9/3) once supplies are available.    Treatment Plan  Peg tube wounds: Every other day   Cleanse wound bed with NS and pat dry.  Paint gera tube area with No sting film barrier  Cut a piece of Hydrofera blue (#[id number]) and split in the middle. Dampen it with NS and wring out excess  Place it under the tube. DO not add more dressing under the bumper, may add gauze on top of the bumper if needed      Orders Written  Recommended provider order: None, at this time  WO Nurse follow-up plan:friday- to apply silver nitrate  Nursing to notify the Provider(s) and re-consult the WOC Nurse if wound(s) deteriorates or new skin concern.    Patient History  According to provider note(s):  30 year old male with a history of heavy alcohol use who presented to OSH 5/19 for abdominal pain, nausea and vomiting, admitted for alcohol hepatitis, ESLD, RADHA on HD, septic shock requiring pressors as well as necrotizing pancreatitis with large evolving necrotic collection. Transferred to [facility] for consideration of drainage of presumed infected necrotic collection. S/p EUS-guided necrosectomy (7/21/21).    Objective Data  Containment of urine/stool: Continent of bladder and Continent of bowel    Active Diet Order  Orders Placed This Encounter      Regular Diet Adult      Output:   I/O last 3 completed shifts:  In: 1440 [I.V.:40; NG/GT:905]  Out: 1300 [Emesis/NG output:1300]    Risk Assessment:   Sensory Perception: 3-->slightly limited  Moisture: 3-->occasionally moist  Activity: 2-->chairfast  Mobility: 2-->very limited  Nutrition: 2-->probably inadequate  Friction  and Shear: 2-->potential problem  Tyrel Score: 14                          Labs:   Recent Labs   Lab 08/31/21  0837   ALBUMIN 2.1*   HGB 7.8*   WBC 9.6       Physical Exam  Areas of skin assessed: focused peg tube site    Wound Location:  Peg tube       8/19 8/25      Date of last photo 8/19  Wound History:  7/13 - PEG-J placement with T-tag gastropexy    Wound Base: 100 % pale moist red tissue at 9 O'clock- hypergranular tissue     Palpation of the wound bed: normal      Drainage: small     Description of drainage: cloudy and tan     Measurements (length x width x depth, in cm) 0.5  x 0.8  x  0.1 cm (no significant change)  Tunneling N/A     Undermining N/A  Periwound skin: erythema- blanchable and resolving      Color: pink      Temperature: normal   Odor: none  Pain: moderate and with assessment,   Pain intervention prior to dressing change: NA    Interventions  Visual inspection and assessment completed   Wound Care Rationale Protect periwound skin, Promote moist wound healing without tissue dehydration  and Decrease bacterial load  Wound Care: done per plan of care  Supplies: ordered: Hydrofera blue  Current off-loading measures: Pillows  Current support surface: Standard  Low air loss mattress  Education provided to: plan of care and wound progress  Discussed plan of care with Patient and Nurse    Deneen Matthews RN CWOCN

## 2021-09-02 NOTE — PLAN OF CARE
"BP 95/48 (BP Location: Left arm)   Pulse (!) 128   Temp 97.7  F (36.5  C) (Axillary)   Resp 18   Ht 1.676 m (5' 6\")   Wt 63 kg (138 lb 14.2 oz)   SpO2 91%   BMI 22.42 kg/m      Assumed Cares: [id number]  Neuro: A&O x 4; flat affect  Respiratory: O2 4L NC; LS diminished; desats when laying completely flat; pt encouraged to deep breathe to keep sats up  Cardiac: Tachycardic; hypotensive; asymptomatic  GI/: Pt on HD; no BM; zofran x 2 for nausea with temporary effectiveness  Skin: No new skin issues noted  Lines: L triple lumen PICC SL  Pain: Pt frequently asking when his pain meds are due; complains of severe back pain not alleviated with repositioning; given dilaudid x 3 with some effectiveness; pt able to sleep between cares  Diet: TF at goal rate of 55ml/hr; 200cc emesis at 0100 -- TF paused for one hour before being resumed at current rate  Activity Level: 1 assist with cares in bed; 2 assist with a lift if OOB  Events: Emesis causing TF to be paused for one hour; another episode of emesis at 0600 -- TF paused again. Provider paged. Will pass along to AM nurse  Plan: Continue with POC; notify provider with changes in pt condition      "

## 2021-09-02 NOTE — PROGRESS NOTES
Nutrition Brief - contacted by MD ( patient with significant nausea / emesis since TF switched to renal formula. Provider request to switch TF back to previous regimen)    Diet: Regular, no po  TF: Switched to Nova source renal on 8/31 due to uptrend in phos lab values.     Interventions:  Updated TF order set to the following:     Dosing wt: Adjusted to 63 kg lowest wt this admit  Access: Jejunostomy tube of PEG/J ( G-tube open to gravity at all times per RN report).     Vital 1.5 @ goal of  55 ml/hr (1320ml/day) to provide 1980 kcals (31 kcal/kg), 89 gm PRO (1.4 gm/kg), 1008 ml free H2O, 246 gm CHO, and 7 gm soluble fiber daily.    Modules:   --Banatrol 1 packet TID   --Prosource TF Free, 1 packet daily ( 90 kcal + 15 gm protein) = 104 gm protein(1.7 gm/kg).       Recommendations:   Sevelamer can clog tubes.  Do not give via FT. Nephrology to assess for alternative phos binders that can be given via FT     Jesica Blunt RD/MARYELLEN  Pager [phone]

## 2021-09-02 NOTE — PROGRESS NOTES
Nephrology Progress Note  09/02/2021         Dax Villareal is a 30 year old male with h/o ETOH hepatitis, end stage liver disease, RADHA on HD, transferred from [facility] in [place] for septic shock on 6/28/21 for treatment of necrotizing pancreatitis and decompensated liver disease. He was initially admitted to [facility] 5/19/21 and started on RRT 5/26/2021.        Interval History :   Mr Villareal had necrosectomy for 4th time on 8/11 with no more anticipated.  We planned for HD today but on arrival to HD unit he was having active vomiting despite getting zofran the past few days.  I ordered an additional dose of zofran and we can hold on run today, will plan to run HD tomorrow.  Sevelamer was started today but nausea is ongoing so this is unlikely to be the culprit but we can stop his calcium carb and sodium bicarb as these might be contributing.        Assessment & Recommendations:   ESRD-Cr was 0.8 as recently as 5/19/2021 but now HD dependent for 3 months, started RRT at [facility] prior to transfer to [facility] on 5/26.  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  continuing restorative cares for now, BP's have been stable enough to support HD.  Had GI stenting of pancreas x4.                 -Line is TDC from [facility]               -Held on HD today as he was having active emesis, will plan for run tomorrow. .      Volume status-Anuric, net negative on his own with drain output so needing to give volume on run today.  No volume overload on exam, EDW continues to decrease but mostly due to drain output.        Electrolytes/pH-Not checked today, held on run.       Ca/phos/pth-Ca 9.4, corrects to normal with low albumin.  Mg and Phos reasonable.       Anemia-Hgb low at 7.8, acute management per team, last PRBC's 7/14.  Started on EPO 5k with runs, iron sats 43%      Nutrition-Taking PO, appetite reasonable.       Seen and discussed with  "Dr Hanley      Recommendations were communicated to primary team via note          SOFIA Marlow CNS  Clinical Nurse Specialist  [phone]  Attestation:  This patient has been seen, examined and evaluated by me, Pia Hanley MD as a shared visit with the NP/PA above.     In brief:  Dax Villareal is a 30 year old male on dialysis after a complicated pancreatitis course. While setting up dialysis today he began vomiting a clear, yellowish emesis. No evidence for blood.     I personally reviewed major complaints, physical findings, investigations, medications, lab values, vital signs, I/O's and the overall management plan.      My key findings and Decisions:  1. Dialysis: doesn't really need this from a volume standpoint  And his labs are stable. Stop dialysis now. Reassess in am    2. Vomiting: on zofran. Unclear reason. He is on some meds that may contribute (see above). Sevelemer is well associated with nausea but his symptoms pre-date the start of this medicaiton.       Pia Hanley MD MS  Date of service (date I saw patient) September 2, 2021      Review of Systems:   I reviewed the following systems:  Gen: No fevers or chills  CV: No CP at rest  Resp: No SOB at rest  GI: sig nausea and vomiting despite ongoing zofran use no hematemesis    Physical Exam:   I/O last 3 completed shifts:  In: 1440 [I.V.:40; NG/GT:905]  Out: 1300 [Emesis/NG output:1300]   BP (!) 77/36 (BP Location: Left arm)   Pulse 120   Temp 96.8  F (36  C) (Oral)   Resp 16   Ht 1.676 m (5' 6\")   Wt 62.5 kg (137 lb 12.6 oz)   SpO2 91%   BMI 22.24 kg/m       GENERAL APPEARANCE: Lying in bed, clearly distressed and vomiting  EYES:  scleral icterus, pupils equal  Pulmonary: decreased BS   CV: tachy   - Edema - no LE edema  GI: mild distention, non-tender. Has pancreatic drain with large amounts of yellowish clear non purulent fluid  MS: no evidence of inflammation in joints, no muscle tenderness  SKIN: no rash, warm, dry, no " cyanosis  NEURO: alert/interactive  ACCESS: Right TDC with no induration or erythema    Labs:   All labs reviewed by me  Electrolytes/Renal - Recent Labs   Lab Test 09/02/21  0545 09/01/21  0646 08/31/21  0837 08/30/21  0533   NA  --  127* 127* 128*   POTASSIUM  --  3.6 4.6 4.3   CHLORIDE  --  90* 90* 94   CO2  --  25 23 22   BUN  --  35* 60* 43*   CR  --  2.90* 4.10* 2.92*   GLC  --  106* 126* 120*   JANENE  --  9.4 9.0 9.2   MAG 2.7* 2.3 2.5* 2.2   PHOS 9.2* 7.6* 9.7* 8.0*       CBC -   Recent Labs   Lab Test 08/31/21  0837 08/28/21  0536 08/27/21  0731   WBC 9.6 11.5* 11.5*   HGB 7.8* 8.2* 8.3*    363 343       LFTs -   Recent Labs   Lab Test 08/31/21  0837 08/27/21  0731 08/26/21  0657   ALKPHOS 100 104 108   BILITOTAL 1.7* 1.5* 1.6*   ALT 31 24 23   AST 50* 56* 48*   PROTTOTAL 9.1* 8.6 8.7   ALBUMIN 2.1* 2.1* 1.7*       Iron Panel -   Recent Labs   Lab Test 08/14/21 2055 07/19/21  0632   IRON 28* 31*   IRONSAT 21 43   JERRELL 2,186*  --            Current Medications:    sodium chloride 0.9%  250 mL Intravenous Once in dialysis/CRRT     sodium chloride 0.9%  300 mL Hemodialysis Machine Once     sodium bicarbonate  325 mg Per Feeding Tube Q4H    And     amylase-lipase-protease  1-2 capsule Per Feeding Tube Q4H     amylase-lipase-protease  2 capsule Oral TID w/meals     B and C vitamin Complex with folic acid  5 mL Per Feeding Tube Daily     banatrol plus  1 packet Per Feeding Tube TID     epoetin charles-epbx (RETACRIT) inj ESRD  5,000 Units Intravenous Once in dialysis/CRRT     folic acid  1 mg Oral Daily     heparin lock flush  5-20 mL Intracatheter Q24H     melatonin  6 mg Oral At Bedtime     menthol   Transdermal Q8H     - MEDICATION INSTRUCTIONS -   Does not apply Once     ondansetron  4 mg Intravenous Once     oxyCODONE  5 mg Oral Q8H     pantoprazole (PROTONIX) IV  40 mg Intravenous Daily with breakfast     sevelamer carbonate (RENVELA)  0.8 g Oral TID w/meals     sodium chloride (PF)  3 mL Intracatheter  Q8H     sodium chloride (PF)  9 mL Intracatheter During Dialysis/CRRT (from stock)     sodium chloride (PF)  9 mL Intracatheter During Dialysis/CRRT (from stock)     vitamin B1  100 mg Oral Daily    Or     thiamine  100 mg Intravenous Daily       - MEDICATION INSTRUCTIONS -       dextrose       heparin (porcine) 1,000 Units/hr (08/24/21 0921)

## 2021-09-02 NOTE — PROGRESS NOTES
Care Management Follow Up    Length of Stay (days): 66    Expected Discharge Date: 09/08/2021     Concerns to be Addressed: discharge planning     Patient plan of care discussed at interdisciplinary rounds: Yes    Anticipated Discharge Disposition: Skilled Nursing Facilty, Transitional Care, LTACH     Anticipated Discharge Services:  (New outpatient dialysis)  Anticipated Discharge DME: None    Patient/family educated on Medicare website which has current facility and service quality ratings: no (Pt requesting TCU in or near [place])  Education Provided on the Discharge Plan: Yes   Patient/Family in Agreement with the Plan: yes    Referrals Placed by CM/SW: None at this time.    Private pay costs discussed: Not applicable    Additional Information:  SW following for discharge placement. Spoke with CHELSIE Vera financial counselor. She provided clarification that pt's hospital admission will likely be covered as he had straight medical assistance when he admitted here on 6/28/21, so billing will likely push for his entire stay to be covered even though he now has an [facility] PMAP. With pt's permission, Jody may be able to declare pt homeless within [facility] (as pt does not have an established address yet), which would allow pt's PMAP to be switched. This would then allow pt to have in network coverage for [facility] area LTACH/TCU/dialysis.      SW attempted to follow up with pt on the above. Pt was to dialyze today, however, this was canceled due to active vomiting. Pt not in a state to speak about insurance matters today. Will re attempt tomorrow, 9/3.     RONNA Delaney, [facility]Fairview General HospitalSW  [address]   [facility]  Phone: [phone]  Pager: [phone]

## 2021-09-02 NOTE — PROGRESS NOTES
[facility]    Medicine Progress Note - Hospitalist Service, [facility]a 3       Date of Admission:  6/28/2021    Assessment & Plan         Dax Villareal is a 30 year old male with hx of alcohol use disorder admitted on 6/28/2021 after recent prolonged admission at [facility] in [place] who has severe acute alcoholic hepatitis c/b HE, ESRD requiring HD, and malnutrition in the setting of acute necrotizing pancreatitis, s/p PEG-J placement on 7/13. He arrived with acute respiratory failure and septic shock that have resolved, and his secondary bacterial peritonitis has been appropriately treated. He requires continued management for ESRD, hepatic encephalopathy, infected pancreatic pseudocyst, nutritional support via PEG-J, and persistent right pleural effusion. Clinically improving after multiple necrosectomies.     Today:   - Working closely with CM and SW concerning placement; [place], [place]  - Touching base with RD for TF change as patient with increasing n/v since transition to [facility] renal      #Septic Shock  #Necrotizing alcoholic pancreatitis w/ infected pseudocyst  #Secondary Bacterial Peritonitis 2/2 infected pseudocyst with VRE  Admitted to the [facility] ICU on 6/28/2021 from [facility] in [place] for septic shock due to necrotizing pancreatitis. CT on admit showing mature collection with enhancing wall measuring ~[id number]. IR placed perc drain 6/29. Repeat imaging 7/18 revealed walled off necrotic collection amenable to endoscopic transluminal drainage with cystgastrostomy stenting with necrosectomy on 7/21/21. Complete necrosectomy on 8/11. Antibiotics (cefepime and metronidazole) were discontinued on 8/25 after left flank drain removed. Patient has remained stable.   BCx: NGTD since 5/18/2021     #Gastric Outlet Obstruction  High G tube output due to the cystogastric connection/fluid drainage noted  on 8/5. Per GI, possibly related to gastric outlet from severe abdominal inflammation and is expected. KUB negative for obstructive gas pattern. Continues to have moderate NG output - stable.      #Hyponatremia, stable  Patient with down-trending sodium the week of 8/27; likely due to substantial NG output and low solute intake and hepatic dysfunction. Stable     #Right Pancreatic Hydrothorax, stable  Increased dyspnea with CXR on 7/19 with further imaging and thoracentesis confirming a hepatic/pancreatic hydrothorax. Thoracentesis PRN for dyspnea.     #Alcoholic Hepatitis  #Coagulation Defect  Received 1 week of steroids at [facility] for elevated INR and altered mental status. Not a liver transplant candidate. Mental status improved with aggressive lactulose and rifaxamin, which was discontinued without return of AMS.     #Hepatic Fluid collection vs Abscess  CT Ab/pelvis from 8/13 and 8/21 showing a 3.0 x 2.7 cm hepatic fluid collection. IR consulted and recommended continue ABx without intervention until drain removed. CT imaging on 8/23 shows fluid collection reduced in size measuring 2 x 2.1cm.      ESRD due to ATN  Stage III RADHA due to ATN from septic shock without recovery for >3 months. Line is TDC from PTA. Nephrology dialyzed on Tu, Th, Sa schedule.  - Will need follow-up and outpatient dialysis     #Anemia due to Chronic Illness  #Hematochezia, resolved  #Hemoperitoneum  Baseline hgb 17. Running between 6-8 in setting of chronic illness, new ESRD with epocrit per Nephrology, frequent lab draws and procedures. Hematochezia on 8/8 after starting standard intensity heparin, now resolved. Paracentesis on 8/21 with hemoperitoneum. No active bleeding.   - Goal hgb > 7.0, trend Hgb     #Alcohol Use Disorder  Counseled patient on complete alcohol cessation. Not interested in additional services at this time. Continue daily folic acid and thiamine.     #Lactic Acidosis  Intermittent Lactic acidosis in the setting  of sepsis, ESRD, bleeding.     #Hypokalemia  Noted in the setting of high gastric output and improved once G-tube output decreased. CTM.      #Severe Malnutrition  Poor oral intake in the setting of necrotizing pancreatitis. PEG tube placed 7/13. Dietitian consulted and continuous tube feeds with goal of 65 ml/hr with continuous pancreatic enzymes  - Continuous tube feeds 50 mL overnight.       Diet: Regular Diet Adult  Adult Formula Drip Feeding: Continuous Novasource Renal; Jejunostomy; Goal Rate: 45; mL/hr; Medication - Feeding Tube Flush Frequency: At least 15-30 mL water before and after medication administration and with tube clogging; Amount to Send (Nutri...    DVT Prophylaxis: Pneumatic Compression Devices  Rdz Catheter: Not present  Central Lines: PRESENT  PICC Triple Lumen 06/28/21 Left-Site Assessment: WDL  CVC Double Lumen Right-Site Assessment: WDL  Code Status: Full Code      Disposition Plan   Expected discharge: 09/08/2021   recommended to transitional care unit once safe disposition plan/ TCU bed available. May also transfer back to [facility].     The patient's care was discussed with the Bedside Nurse, Care Coordinator/ and Patient.    Patient seen by and staffed with Dr. Joshua.     Anali Jackson MD   Med/Peds, PGY-2  Hospitalist Service, [address]  Securely message with the Vocera Web Console (learn more here)  Text page via [employer] Paging/Directory  Please see sign in/sign out for up to date coverage information  _____________________________________________________________________    Interval History   Patient continues to have nausea and vomiting above baseline today - curious about recent change in formula contributing. Increase in supplemental O2 - will talk about drainage, though patient does not feel more dyspneic.     The remainder of a 4 point ROS is negative unless otherwise noted above.    Data  reviewed today: I reviewed all medications, new labs and imaging results over the last 24 hours.    Physical Exam   Vital Signs: Temp: 96.8  F (36  C) Temp src: Oral BP: (!) 77/36 Pulse: 120   Resp: 16 SpO2: 91 % O2 Device: Nasal cannula Oxygen Delivery: 5 LPM  Weight: 137 lbs 12.6 oz  Gen: Awake, alert, in no acute distress laying in bed  HEENT: NC/AT, sclera anicteric  CV: Regular rate and rhythm  Resp: Non-labored breathing on room air, diminished breath sounds over right hemithorax as per previous.  Abd: soft, mild tenderness to palpation throughout, PEG site c/d/i  Extrem: Warm and well perfused with no edema.

## 2021-09-02 NOTE — PROVIDER NOTIFICATION
"Provider notified (name): Jayne Olea MD  Reason for notification: \"Code sepsis called for elevated lactic\"  Recommendation/request given to provider: See patient  Response from provider: Awaiting    "

## 2021-09-02 NOTE — PROGRESS NOTES
Rapid Response Team Note    Assessment   In assessment a rapid response was called on Dax Villareal due to Hyperlactatemia w/ LA 2.8. Patient noted to have increase in nausea and mild abdominal distention/pain. HD held today dd/t nausea. Upon bedside evaluation, patient noted to have soft BPs w/ SBP 88 (appears BL ) --> increase to 98/38 w/o intervention, tachycardia 120 (per recent BL), and mild increase in O2 needs for sots 88% on 4L. Os sats increased to 94% when placed on 6L. Patient endorses ongoing nausea w/ non-bloody emesis; abdominal discomfort. No fever, chills, Cough, CP, sob, HA, vision changes. POC as below:        Plan   -  Check CBC, CMP, Lipase repeat LA in 2 hours  -  Hold off on IVF now w/ increasing O2 needs and no HD today  - Would give albumin if w/ persistent hypotension   - AXR and CXR to eval for acute pathology  -  The Internal Medicine ([facility]) primary team was able to be reached and they are in agreement with the above plan.  -  Disposition: The patient will remain on the current unit. We will continue to monitor this patient closely.  -  Reassessment and plan follow-up will be performed by the primary team      Raegan Virk PA-C  [facility] RRT AMCOM Job Code Contact #[phone]    Hospital Course   Brief Summary of events leading to rapid response:   Dax Villareal is a 30 year old male with h/o ETOH hepatitis, end stage liver disease, RADHA on HD, transferred from [facility] in [place] for septic shock on 6/28/21 for treatment of necrotizing pancreatitis and decompensated liver disease. He was initially admitted to [facility] 5/19/21 and started on RRT 5/26/2021. RRT called for Hyperlactatemia w/ LA 2.8. Hu as above.     Admission Diagnosis:   Pancreatitis [K85.90]     Physical Exam   Temp: (!) 96.5  F (35.8  C) Temp  Min: 96  F (35.6  C)  Max: 97.7  F (36.5  C)  Resp: 20 Resp  Min: 16  Max: 22  SpO2: 93 % SpO2  Min: 87 %  Max: 95 %  Pulse: (!) 122 Pulse  Min: 112  Max: 128    No  data recorded  BP: 94/43 Systolic (24hrs), Av , Min:77 , Max:98   Diastolic (24hrs), Av, Min:36, Max:48     I/Os: I/O last 3 completed shifts:  In: 1180 [I.V.:20; NG/GT:755]  Out: 800 [Emesis/NG output:800]     Exam:   General: chronically ill appearing  Mental Status: baseline mental status.  Resp: breathing non-labored on 6L via NC. No wheeze or rhonchi noted. Diminished on Right   CV: Tachycardia, though no m/r/g  GI: Abdomen distended, though soft. No rebound or guarding.  Skin: Jaundice     Significant Results and Procedures   Lactic Acid:   Recent Labs   Lab Test 21  1555 21  0217 21  0148 21  2215 21  2116 21  2106 21  2106   LACT 2.8* 1.8 1.8   < >  --   --   --    LACTS  --   --   --   --  1.1 0.9 1.5    < > = values in this interval not displayed.     CBC:   Recent Labs   Lab Test 21  0837 21  0536 21  0731   WBC 9.6 11.5* 11.5*   HGB 7.8* 8.2* 8.3*   HCT 24.7* 26.2* 27.3*    363 343        Sepsis Evaluation   The patient is not known to have an infection.  NO EVIDENCE OF SEPSIS at this time.  Vital sign, physical exam, and lab findings are due to ESRD on HD (missed HD today), ESLD, abdominal patho w/ n/V.

## 2021-09-02 NOTE — PROVIDER NOTIFICATION
Provider notified (name): Tri Suarez Intern  Reason for notification: Pt vomited 200cc and is requesting to have TF off   Recommendation/request given to provider: OK to pause for one hour?  Response from provider: Pause TF for now. Resume in one hour at current rate

## 2021-09-02 NOTE — PROGRESS NOTES
Patient with increasing lactate 2.8 > 3.0. Labs significant for a leukocytosis of 21k and sodium of 124 (127 previously. He is feeling well and has no new complaints. Exam significant for lack of breath sounds in right fields and tachycardia. No change in tenderness of abdomen. CXR showing large effusion and total atelectasis of the right lung. His oxygenation is stable at this time of 5L. Concern for abdominal source as the patient recently discontinued cefepime and flagyl when his left flank drain was pulled on 8/26.     Will give fluid at this time, if he has respiratory distress, would be concerned that he has left effusion in addition to his right and would need intervention overnight. Will otherwise plan to drain right effusion tomorrow.    - trend lactate  - 1L NS  - cefepime and flagyl   - CT abdomen/pelvis  - follow BCx    Qamar Jackson MD  Med/Peds, PGY-2  [phone]

## 2021-09-02 NOTE — PROVIDER NOTIFICATION
Provider notified (name):Jayne Olea, ([id number])  Reason for notification: recheck Lactic acid  3.0  Recommendation/request given to provider: page placed with result .   Response from provider: [facility] team aware and will come to room to assess patient.

## 2021-09-03 ENCOUNTER — APPOINTMENT (OUTPATIENT)
Dept: GENERAL RADIOLOGY | Facility: CLINIC | Age: 31
End: 2021-09-03
Attending: PEDIATRICS
Payer: MEDICAID

## 2021-09-03 LAB
ALBUMIN SERPL-MCNC: 2.3 G/DL (ref 3.4–5)
ALP SERPL-CCNC: 95 U/L (ref 40–150)
ALT SERPL W P-5'-P-CCNC: 31 U/L (ref 0–70)
ANION GAP SERPL CALCULATED.3IONS-SCNC: 17 MMOL/L (ref 3–14)
APPEARANCE FLD: ABNORMAL
AST SERPL W P-5'-P-CCNC: 39 U/L (ref 0–45)
BASOPHILS NFR FLD MANUAL: 1 %
BILIRUB SERPL-MCNC: 2 MG/DL (ref 0.2–1.3)
BUN SERPL-MCNC: 74 MG/DL (ref 7–30)
CALCIUM SERPL-MCNC: 9.6 MG/DL (ref 8.5–10.1)
CHLORIDE BLD-SCNC: 87 MMOL/L (ref 94–109)
CO2 SERPL-SCNC: 19 MMOL/L (ref 20–32)
COLOR FLD: YELLOW
CREAT SERPL-MCNC: 4.9 MG/DL (ref 0.66–1.25)
ERYTHROCYTE [DISTWIDTH] IN BLOOD BY AUTOMATED COUNT: 16.7 % (ref 10–15)
FIBRINOGEN PPP-MCNC: 456 MG/DL (ref 170–490)
GFR SERPL CREATININE-BSD FRML MDRD: 15 ML/MIN/1.73M2
GLUCOSE BLD-MCNC: 146 MG/DL (ref 70–99)
HCT VFR BLD AUTO: 24.4 % (ref 40–53)
HGB BLD-MCNC: 7.8 G/DL (ref 13.3–17.7)
INR PPP: 1.81 (ref 0.85–1.15)
LACTATE SERPL-SCNC: 2.2 MMOL/L (ref 0.7–2)
LYMPHOCYTES NFR FLD MANUAL: 12 %
MAGNESIUM SERPL-MCNC: 2.7 MG/DL (ref 1.6–2.3)
MCH RBC QN AUTO: 29.9 PG (ref 26.5–33)
MCHC RBC AUTO-ENTMCNC: 32 G/DL (ref 31.5–36.5)
MCV RBC AUTO: 94 FL (ref 78–100)
MONOS+MACROS NFR FLD MANUAL: NORMAL %
NEUTS BAND NFR FLD MANUAL: 5 %
OTHER CELLS FLD MANUAL: 82 %
PH FLD: 7.6 PH
PHOSPHATE SERPL-MCNC: 10.9 MG/DL (ref 2.5–4.5)
PLATELET # BLD AUTO: 316 10E3/UL (ref 150–450)
POTASSIUM BLD-SCNC: 3.4 MMOL/L (ref 3.4–5.3)
PROT SERPL-MCNC: 9.2 G/DL (ref 6.8–8.8)
RBC # BLD AUTO: 2.61 10E6/UL (ref 4.4–5.9)
SODIUM SERPL-SCNC: 123 MMOL/L (ref 133–144)
WBC # BLD AUTO: 15.7 10E3/UL (ref 4–11)
WBC # FLD AUTO: 435 /UL

## 2021-09-03 PROCEDURE — 89050 BODY FLUID CELL COUNT: CPT | Performed by: STUDENT IN AN ORGANIZED HEALTH CARE EDUCATION/TRAINING PROGRAM

## 2021-09-03 PROCEDURE — C9113 INJ PANTOPRAZOLE SODIUM, VIA: HCPCS | Performed by: INTERNAL MEDICINE

## 2021-09-03 PROCEDURE — 80053 COMPREHEN METABOLIC PANEL: CPT | Performed by: STUDENT IN AN ORGANIZED HEALTH CARE EDUCATION/TRAINING PROGRAM

## 2021-09-03 PROCEDURE — 85610 PROTHROMBIN TIME: CPT | Performed by: STUDENT IN AN ORGANIZED HEALTH CARE EDUCATION/TRAINING PROGRAM

## 2021-09-03 PROCEDURE — 87075 CULTR BACTERIA EXCEPT BLOOD: CPT | Performed by: STUDENT IN AN ORGANIZED HEALTH CARE EDUCATION/TRAINING PROGRAM

## 2021-09-03 PROCEDURE — 90937 HEMODIALYSIS REPEATED EVAL: CPT

## 2021-09-03 PROCEDURE — 87205 SMEAR GRAM STAIN: CPT | Performed by: STUDENT IN AN ORGANIZED HEALTH CARE EDUCATION/TRAINING PROGRAM

## 2021-09-03 PROCEDURE — P9041 ALBUMIN (HUMAN),5%, 50ML: HCPCS | Performed by: STUDENT IN AN ORGANIZED HEALTH CARE EDUCATION/TRAINING PROGRAM

## 2021-09-03 PROCEDURE — 999N000202 HC STATISTICAL VASC ACCESS NURSE TIME, 1-15 MINUTES

## 2021-09-03 PROCEDURE — 250N000013 HC RX MED GY IP 250 OP 250 PS 637: Performed by: PEDIATRICS

## 2021-09-03 PROCEDURE — 36592 COLLECT BLOOD FROM PICC: CPT

## 2021-09-03 PROCEDURE — 32555 ASPIRATE PLEURA W/ IMAGING: CPT | Performed by: PEDIATRICS

## 2021-09-03 PROCEDURE — 99207 PR CDG-CUT & PASTE-POTENTIAL IMPACT ON LEVEL: CPT | Performed by: PEDIATRICS

## 2021-09-03 PROCEDURE — 84100 ASSAY OF PHOSPHORUS: CPT | Performed by: INTERNAL MEDICINE

## 2021-09-03 PROCEDURE — 99233 SBSQ HOSP IP/OBS HIGH 50: CPT | Mod: GC | Performed by: STUDENT IN AN ORGANIZED HEALTH CARE EDUCATION/TRAINING PROGRAM

## 2021-09-03 PROCEDURE — 250N000013 HC RX MED GY IP 250 OP 250 PS 637: Performed by: STUDENT IN AN ORGANIZED HEALTH CARE EDUCATION/TRAINING PROGRAM

## 2021-09-03 PROCEDURE — 83735 ASSAY OF MAGNESIUM: CPT | Performed by: INTERNAL MEDICINE

## 2021-09-03 PROCEDURE — 250N000013 HC RX MED GY IP 250 OP 250 PS 637: Performed by: INTERNAL MEDICINE

## 2021-09-03 PROCEDURE — 83986 ASSAY PH BODY FLUID NOS: CPT | Performed by: STUDENT IN AN ORGANIZED HEALTH CARE EDUCATION/TRAINING PROGRAM

## 2021-09-03 PROCEDURE — 250N000011 HC RX IP 250 OP 636

## 2021-09-03 PROCEDURE — 99233 SBSQ HOSP IP/OBS HIGH 50: CPT | Mod: 25 | Performed by: INTERNAL MEDICINE

## 2021-09-03 PROCEDURE — 250N000011 HC RX IP 250 OP 636: Performed by: INTERNAL MEDICINE

## 2021-09-03 PROCEDURE — 85027 COMPLETE CBC AUTOMATED: CPT | Performed by: STUDENT IN AN ORGANIZED HEALTH CARE EDUCATION/TRAINING PROGRAM

## 2021-09-03 PROCEDURE — 71045 X-RAY EXAM CHEST 1 VIEW: CPT | Mod: 26 | Performed by: RADIOLOGY

## 2021-09-03 PROCEDURE — G0463 HOSPITAL OUTPT CLINIC VISIT: HCPCS

## 2021-09-03 PROCEDURE — 250N000011 HC RX IP 250 OP 636: Performed by: STUDENT IN AN ORGANIZED HEALTH CARE EDUCATION/TRAINING PROGRAM

## 2021-09-03 PROCEDURE — 99233 SBSQ HOSP IP/OBS HIGH 50: CPT | Mod: GC | Performed by: PEDIATRICS

## 2021-09-03 PROCEDURE — 120N000002 HC R&B MED SURG/OB UMMC

## 2021-09-03 PROCEDURE — 87040 BLOOD CULTURE FOR BACTERIA: CPT

## 2021-09-03 PROCEDURE — 87102 FUNGUS ISOLATION CULTURE: CPT | Performed by: STUDENT IN AN ORGANIZED HEALTH CARE EDUCATION/TRAINING PROGRAM

## 2021-09-03 PROCEDURE — 89051 BODY FLUID CELL COUNT: CPT | Performed by: STUDENT IN AN ORGANIZED HEALTH CARE EDUCATION/TRAINING PROGRAM

## 2021-09-03 PROCEDURE — 258N000003 HC RX IP 258 OP 636: Performed by: CLINICAL NURSE SPECIALIST

## 2021-09-03 PROCEDURE — 85384 FIBRINOGEN ACTIVITY: CPT | Performed by: STUDENT IN AN ORGANIZED HEALTH CARE EDUCATION/TRAINING PROGRAM

## 2021-09-03 PROCEDURE — 634N000001 HC RX 634: Performed by: CLINICAL NURSE SPECIALIST

## 2021-09-03 PROCEDURE — 0W9G3ZZ DRAINAGE OF PERITONEAL CAVITY, PERCUTANEOUS APPROACH: ICD-10-PCS | Performed by: PEDIATRICS

## 2021-09-03 PROCEDURE — 999N000065 XR CHEST PORT 1 VIEW

## 2021-09-03 PROCEDURE — 36592 COLLECT BLOOD FROM PICC: CPT | Performed by: STUDENT IN AN ORGANIZED HEALTH CARE EDUCATION/TRAINING PROGRAM

## 2021-09-03 PROCEDURE — 83605 ASSAY OF LACTIC ACID: CPT

## 2021-09-03 PROCEDURE — 87070 CULTURE OTHR SPECIMN AEROBIC: CPT | Performed by: STUDENT IN AN ORGANIZED HEALTH CARE EDUCATION/TRAINING PROGRAM

## 2021-09-03 PROCEDURE — P9047 ALBUMIN (HUMAN), 25%, 50ML: HCPCS

## 2021-09-03 RX ORDER — ALBUMIN (HUMAN) 12.5 G/50ML
12.5 SOLUTION INTRAVENOUS ONCE
Status: COMPLETED | OUTPATIENT
Start: 2021-09-03 | End: 2021-09-03

## 2021-09-03 RX ORDER — ALBUMIN, HUMAN INJ 5% 5 %
25 SOLUTION INTRAVENOUS ONCE
Status: DISCONTINUED | OUTPATIENT
Start: 2021-09-04 | End: 2021-09-03 | Stop reason: CLARIF

## 2021-09-03 RX ORDER — ALBUMIN (HUMAN) 12.5 G/50ML
12.5 SOLUTION INTRAVENOUS ONCE
Status: COMPLETED | OUTPATIENT
Start: 2021-09-04 | End: 2021-09-04

## 2021-09-03 RX ORDER — ALBUMIN, HUMAN INJ 5% 5 %
25 SOLUTION INTRAVENOUS ONCE
Status: COMPLETED | OUTPATIENT
Start: 2021-09-03 | End: 2021-09-03

## 2021-09-03 RX ORDER — LIDOCAINE 40 MG/G
CREAM TOPICAL
Status: DISCONTINUED | OUTPATIENT
Start: 2021-09-03 | End: 2021-10-12 | Stop reason: HOSPADM

## 2021-09-03 RX ADMIN — SODIUM CHLORIDE 300 ML: 9 INJECTION, SOLUTION INTRAVENOUS at 12:47

## 2021-09-03 RX ADMIN — OXYCODONE HYDROCHLORIDE 5 MG: 5 SOLUTION ORAL at 22:19

## 2021-09-03 RX ADMIN — ONDANSETRON 4 MG: 2 INJECTION INTRAMUSCULAR; INTRAVENOUS at 17:50

## 2021-09-03 RX ADMIN — Medication 5 ML: at 07:57

## 2021-09-03 RX ADMIN — OXYCODONE HYDROCHLORIDE 5 MG: 5 SOLUTION ORAL at 07:47

## 2021-09-03 RX ADMIN — SODIUM CHLORIDE 250 ML: 9 INJECTION, SOLUTION INTRAVENOUS at 12:47

## 2021-09-03 RX ADMIN — CEFEPIME HYDROCHLORIDE 1 G: 1 INJECTION, POWDER, FOR SOLUTION INTRAMUSCULAR; INTRAVENOUS at 20:21

## 2021-09-03 RX ADMIN — EPOETIN ALFA-EPBX 4000 UNITS: 10000 INJECTION, SOLUTION INTRAVENOUS; SUBCUTANEOUS at 14:57

## 2021-09-03 RX ADMIN — ONDANSETRON 4 MG: 2 INJECTION INTRAMUSCULAR; INTRAVENOUS at 08:54

## 2021-09-03 RX ADMIN — PANCRELIPASE 1 CAPSULE: 24000; 76000; 120000 CAPSULE, DELAYED RELEASE PELLETS ORAL at 06:20

## 2021-09-03 RX ADMIN — Medication 1 PACKET: at 07:56

## 2021-09-03 RX ADMIN — PANCRELIPASE 2 CAPSULE: 24000; 76000; 120000 CAPSULE, DELAYED RELEASE PELLETS ORAL at 21:59

## 2021-09-03 RX ADMIN — PANCRELIPASE 1 CAPSULE: 24000; 76000; 120000 CAPSULE, DELAYED RELEASE PELLETS ORAL at 03:25

## 2021-09-03 RX ADMIN — FOLIC ACID 1 MG: 1 TABLET ORAL at 07:55

## 2021-09-03 RX ADMIN — METRONIDAZOLE 500 MG: 500 INJECTION, SOLUTION INTRAVENOUS at 21:13

## 2021-09-03 RX ADMIN — METRONIDAZOLE 500 MG: 500 INJECTION, SOLUTION INTRAVENOUS at 07:48

## 2021-09-03 RX ADMIN — OXYCODONE HYDROCHLORIDE 5 MG: 5 SOLUTION ORAL at 16:28

## 2021-09-03 RX ADMIN — ONDANSETRON 4 MG: 2 INJECTION INTRAMUSCULAR; INTRAVENOUS at 01:41

## 2021-09-03 RX ADMIN — HYDROMORPHONE HYDROCHLORIDE 1 MG: 1 SOLUTION ORAL at 00:08

## 2021-09-03 RX ADMIN — PANTOPRAZOLE SODIUM 40 MG: 40 INJECTION, POWDER, FOR SOLUTION INTRAVENOUS at 07:55

## 2021-09-03 RX ADMIN — ACETAMINOPHEN 325 MG: 325 SOLUTION ORAL at 16:00

## 2021-09-03 RX ADMIN — SODIUM BICARBONATE 325 MG: 325 TABLET ORAL at 07:54

## 2021-09-03 RX ADMIN — HYDROXYZINE HYDROCHLORIDE 50 MG: 25 TABLET, FILM COATED ORAL at 15:13

## 2021-09-03 RX ADMIN — SODIUM BICARBONATE 325 MG: 325 TABLET ORAL at 03:25

## 2021-09-03 RX ADMIN — MELATONIN TAB 3 MG 6 MG: 3 TAB at 22:24

## 2021-09-03 RX ADMIN — SODIUM BICARBONATE 325 MG: 325 TABLET ORAL at 06:20

## 2021-09-03 RX ADMIN — SODIUM BICARBONATE 325 MG: 325 TABLET ORAL at 21:59

## 2021-09-03 RX ADMIN — THIAMINE HCL TAB 100 MG 100 MG: 100 TAB at 07:55

## 2021-09-03 RX ADMIN — Medication 1 PACKET: at 20:27

## 2021-09-03 RX ADMIN — SODIUM BICARBONATE 325 MG: 325 TABLET ORAL at 16:32

## 2021-09-03 RX ADMIN — PANCRELIPASE 1 CAPSULE: 24000; 76000; 120000 CAPSULE, DELAYED RELEASE PELLETS ORAL at 16:32

## 2021-09-03 RX ADMIN — Medication 10 ML: at 16:40

## 2021-09-03 RX ADMIN — PANCRELIPASE 1 CAPSULE: 24000; 76000; 120000 CAPSULE, DELAYED RELEASE PELLETS ORAL at 07:54

## 2021-09-03 RX ADMIN — HYDROMORPHONE HYDROCHLORIDE 1 MG: 1 SOLUTION ORAL at 11:10

## 2021-09-03 RX ADMIN — Medication 1 PACKET: at 08:52

## 2021-09-03 RX ADMIN — ALBUMIN HUMAN 25 G: 0.05 INJECTION, SOLUTION INTRAVENOUS at 08:52

## 2021-09-03 RX ADMIN — ALBUMIN HUMAN 12.5 G: 0.25 SOLUTION INTRAVENOUS at 04:26

## 2021-09-03 ASSESSMENT — ACTIVITIES OF DAILY LIVING (ADL)
ADLS_ACUITY_SCORE: 17

## 2021-09-03 ASSESSMENT — PAIN DESCRIPTION - DESCRIPTORS: DESCRIPTORS: ACHING;SORE

## 2021-09-03 ASSESSMENT — MIFFLIN-ST. JEOR: SCORE: 1527.75

## 2021-09-03 NOTE — PROGRESS NOTES
Nephrology Progress Note  09/03/2021         Dax Villareal is a 30 year old male with h/o ETOH hepatitis, end stage liver disease, RADHA on HD, transferred from [facility] in [place] for septic shock on 6/28/21 for treatment of necrotizing pancreatitis and decompensated liver disease. He was initially admitted to [facility] 5/19/21 and started on RRT 5/26/2021.        Interval History :   Mr Villareal had necrosectomy for 4th time on 8/11 with no more anticipated.  We planned for HD yesterday but he had emesis before starting run and there were no major issues with chemistries.  Planning HD today, BP's are low relative to where he has run, WBC is also up which is concerning for infection.  Will plan to give fluid on run if he has hypotension in order to get clearance as chemistries have room for improvement.        Assessment & Recommendations:   ESRD-Cr was 0.8 as recently as 5/19/2021 but now HD dependent for 3 months, started RRT at [facility] prior to transfer to [facility] on 5/26.  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  continuing restorative cares for now, BP's have been stable enough to support HD.  Had GI stenting of pancreas x4.                 -Line is TDC from PTA               -HD today, has relative hypotension and WBC is up which is concerning for possible infection.  Will not plan to UF today, would consider giving fluid or albumin on run to complete for clearance.       Volume status-Anuric, net negative on his own with drain output so will consider giving volume on run today, we suspect he is missing significant PO intake.       Electrolytes/pH-K 3.4, bicarb 19.  Running HD today, Na 123, chronically runs low despite HD.  Stop bicarb tab. Dialylsis should take care of his acidemia. If not (due to diarrhea etc) he would do better on baking soda as this is easily dissolved and can be put in with his tube feeds (1 tsp provides ~8x  "the amount of 1 bicarbonate tablet)     Ca/phos/pth-Ca 9.6, .  Hyperphosphatemia: phos is 10. This is likely due to the Mt Dew he keeps drinking.     Add sevelemer powder at start of tube feeds and end tube feeds if overnight or TID if continuous    Anemia-Hgb low at 7.8, acute management per team, last PRBC's 7/14.  Started on EPO 5k with runs, iron sats 43%      Nutrition-Taking PO, appetite reasonable.       Seen and discussed with Dr Hanley      Recommendations were communicated to primary team via note       SOFIA Marlow CNS  Clinical Nurse Specialist  [phone]  Attestation:  This patient has been seen, examined and evaluated by me, Pia Hanley MD as a shared visit with the NP/PA above.     In brief:  Dax Villareal is a 30 year old male with severe alcoholic pancreatitis on dialysis. New elevation in WBC, hypotension. Fluid boluses required. Nausea somewhat better today.     I personally reviewed major complaints, physical findings, investigations, medications, lab values, vital signs, I/O's and the overall management plan.      My key findings and Decisions:    1. ESKD: dialysis today as we missed yesterday  2. Acidosis: should correct with dialysis. Stop sodium bicarbonate. If we cannot control with HD then would change to baking soda powder  3. Hyperphosphatemia: start binder    Pia Hanley MD MS  Date of service (date I saw patient) September 3, 2021      Review of Systems:   I reviewed the following systems:  Gen: No fevers or chills  CV: No CP at rest  Resp: No SOB at rest  GI: ongoing nausea and vomiting    Physical Exam:   I/O last 3 completed shifts:  In: 800 [I.V.:20; NG/GT:420]  Out: 100 [Emesis/NG output:100]   BP (!) 94/28 (BP Location: Right arm)   Pulse 109   Temp (!) 95.4  F (35.2  C) (Axillary)   Resp 18   Ht 1.676 m (5' 6\")   Wt 63 kg (138 lb 14.2 oz)   SpO2 91%   BMI 22.42 kg/m       GENERAL APPEARANCE: Lying in bed, clearly distressed and vomiting  EYES:  scleral " icterus, pupils equal  Pulmonary: decreased BS   CV: tachy   - Edema - no LE edema  GI: mild distention, non-tender. Has pancreatic drain with large amounts of yellowish clear non purulent fluid  MS: no evidence of inflammation in joints, no muscle tenderness  SKIN: no rash, warm, dry, no cyanosis  NEURO: alert/interactive  ACCESS: Right TDC with no induration or erythema    Labs:   All labs reviewed by me  Electrolytes/Renal - Recent Labs   Lab Test 09/03/21 0523 09/02/21 1730 09/02/21  0545 09/01/21  0646   * 124*  --  127*   POTASSIUM 3.4 3.5  --  3.6   CHLORIDE 87* 85*  --  90*   CO2 19* 21  --  25   BUN 74* 68*  --  35*   CR 4.90* 4.70*  --  2.90*   * 148*  --  106*   JANENE 9.6 9.8  --  9.4   MAG 2.7*  --  2.7* 2.3   PHOS 10.9*  --  9.2* 7.6*       CBC -   Recent Labs   Lab Test 09/03/21 0523 09/02/21 1730 08/31/21  0837   WBC 15.7* 21.4* 9.6   HGB 7.8* 9.3* 7.8*    386 336       LFTs -   Recent Labs   Lab Test 09/03/21 0523 09/02/21 1730 08/31/21  0837   ALKPHOS 95 120 100   BILITOTAL 2.0* 2.2* 1.7*   ALT 31 40 31   AST 39 57* 50*   PROTTOTAL 9.2* 10.7* 9.1*   ALBUMIN 2.3* 2.7* 2.1*       Iron Panel -   Recent Labs   Lab Test 08/14/21 2055 07/19/21  0632   IRON 28* 31*   IRONSAT 21 43   JERRELL 2,186*  --            Current Medications:    sodium chloride 0.9%  250 mL Intravenous Once in dialysis/CRRT     sodium chloride 0.9%  300 mL Hemodialysis Machine Once     sodium bicarbonate  325 mg Per Feeding Tube Q4H    And     amylase-lipase-protease  1-2 capsule Per Feeding Tube Q4H     amylase-lipase-protease  2 capsule Oral TID w/meals     B and C vitamin Complex with folic acid  5 mL Per Feeding Tube Daily     banatrol plus  1 packet Per Feeding Tube TID     ceFEPIme (MAXIPIME) IV  1 g Intravenous Q24H     epoetin charles-epbx (RETACRIT) inj ESRD  4,000 Units Intravenous Once in dialysis/CRRT     folic acid  1 mg Oral Daily     heparin lock flush  5-20 mL Intracatheter Q24H     melatonin  6 mg  Oral At Bedtime     menthol   Transdermal Q8H     metroNIDAZOLE  500 mg Intravenous Q12H     - MEDICATION INSTRUCTIONS -   Does not apply Once     oxyCODONE  5 mg Oral Q8H     pantoprazole (PROTONIX) IV  40 mg Intravenous Daily with breakfast     protein modular  1 packet Per Feeding Tube Daily     sevelamer carbonate (RENVELA)  0.8 g Oral TID w/meals     sodium chloride (PF)  3 mL Intracatheter Q8H     sodium chloride (PF)  3 mL Intracatheter Q8H     sodium chloride (PF)  9 mL Intracatheter During Dialysis/CRRT (from stock)     sodium chloride (PF)  9 mL Intracatheter During Dialysis/CRRT (from stock)     vitamin B1  100 mg Oral Daily    Or     thiamine  100 mg Intravenous Daily       - MEDICATION INSTRUCTIONS -       dextrose       heparin (porcine) 1,000 Units/hr (08/24/21 0907)

## 2021-09-03 NOTE — PROVIDER NOTIFICATION
09/02/21 1700   Call Information   Date of Call 09/02/21   Time of Call 1657   Name of person requesting the team Margaret   Title of person requesting team RN   RRT Arrival time 1700   Time RRT ended 1729   Reason for call   Type of RRT Adult   Primary reason for call Sepsis suspected   Sepsis Suspected Elevated Lactate level;SPB <90 or MAP 40mmHG;Heart Rate > 100;RR > 20, SaO2 <90% OR increasing O2 need   Was patient transferred from the ED, ICU, or PACU within last 24 hours prior to RRT call? No   SBAR   Situation LA=2.8   Background 30 year old male with hx of alcohol use disorder admitted on 6/28/2021 after recent prolonged admission at [facility] in [place] who has severe acute alcoholic hepatitis c/b HE, ESRD requiring HD, and malnutrition in the setting of acute necrotizing pancreatitis, s/p PEG-J placement on 7/13. He arrived with acute respiratory failure and septic shock that have resolved, and his secondary bacterial peritonitis has been appropriately treated. He requires continued management for ESRD, hepatic encephalopathy, infected pancreatic pseudocyst, nutritional support via PEG-J, and persistent right pleural effusion.   Notable History/Conditions End-Stage disease;Organ failure   Assessment A/OX3, tachycardic, hypotensive, tachypneic, nauseous, jaundiced.     Interventions O2 per N/C or mask   Patient Outcome   Patient Outcome Stabilized on unit   RRT Team   Attending/Primary/Covering Physician Daniela 3   Date Attending Physician notified 09/02/21   Time Attending Physician notified 1700   Physician(s) Raegan Bravo RN Shelby Robles   RT none   Post RRT Intervention Assessment   Post RRT Assessment Stable/Improved   Date Follow Up Done 09/02/21   Time Follow Up Done 1944

## 2021-09-03 NOTE — PROGRESS NOTES
HEMODIALYSIS TREATMENT NOTE    Date: 9/3/2021  Time: 4:09 PM    Data:  Pre Wt: 63 kg (138 lb 14.2 oz)   Desired Wt: 63 kg   Post Wt: 63.6 kg (140 lb 3.4 oz)  Weight change: -0.6 kg  Ultrafiltration - Post Run Net Total Removed (mL): 0 mL  Vascular Access Status: patent  Dialyzer Rinse: Streaked  Total Blood Volume Processed: 55.14 L Liters  Total Dialysis (Treatment) Time: 3 Hours    Lab:   no    Interventions/Assessment:  HD via cvc, 3k 2.25ca, 3hr run, 0uf ordered to be removed. Bolus provided to support soft bp. Patient requested pain medication, provided tylenol and atarax, pain decreased form 9/10 to 4/10. Patient tolerated run well. cvc locked with normal saline and new dressing provided. Handoff report given to PCN.      Plan:    As per renal

## 2021-09-03 NOTE — PROGRESS NOTES
[facility]    Medicine Progress Note - Hospitalist Service, Daniela 3       Date of Admission:  6/28/2021    Assessment & Plan    Dax Villareal is a 30 year old male with hx of alcohol use disorder admitted on 6/28/2021 after recent prolonged admission at [facility] in [place] who has severe acute alcoholic hepatitis c/b HE, ESRD requiring HD, and malnutrition in the setting of acute necrotizing pancreatitis, s/p PEG-J placement on 7/13. He arrived with acute respiratory failure and septic shock that have resolved, and his secondary bacterial peritonitis has been appropriately treated. He requires continued management for ESRD, hepatic encephalopathy, infected pancreatic pseudocyst, nutritional support via PEG-J, and persistent right pleural effusion. Clinically improving after multiple necrosectomies. Increasing leukocytosis and hypotension on 9/2 prompting resumption of IV antibiotics      Today:   - Thoracentesis, diagnostic and therapeutic   - ID consulted, appreciate recs  - Dialysis today  - Continue cefepime and flagyl for 7d with a plan for repeat CT abdomen/pelvis prior to discontinuation   - UA with culture     #Septic Shock  #Necrotizing alcoholic pancreatitis w/ infected pseudocyst  #Secondary Bacterial Peritonitis 2/2 infected pseudocyst with VRE  Admitted to the [facility] ICU on 6/28/2021 from [facility] in [place] for septic shock due to necrotizing pancreatitis. CT on admit showing mature collection with enhancing wall measuring ~[id number]. IR placed perc drain 6/29. Repeat imaging 7/18 revealed walled off necrotic collection amenable to endoscopic transluminal drainage with cystgastrostomy stenting with necrosectomy on 7/21/21. Complete necrosectomy on 8/11. Antibiotics (cefepime and metronidazole) were discontinued on 8/25 after left flank drain removed. Patient with worsening hypotension and leukocytosis on 9/2 for which he received several fluid  boluses and antibiotic therapy resumed. Favor abdominal etiology with loculated fluid collections, though CT improving in comparison to prior images 9/2. ID consulted.  - follow BCx (9/2): NGTD  - right thoracentesis 9/3 , follow labs  - UA with culture   - ID consulted, recommending cefepime and flagyl for 7d (EOT 9/8) with a plan for repeat CT abdomen/pelvis prior to discontinuation      #Gastric Outlet Obstruction  High G tube output due to the cystogastric connection/fluid drainage noted on 8/5. Per GI, possibly related to gastric outlet from severe abdominal inflammation and is expected. KUB negative for obstructive gas pattern. Continues to have moderate NG output - stable.      #Hyponatremia, stable  Patient with down-trending sodium the week of 8/27; likely due to substantial NG output and low solute intake and hepatic dysfunction. Stable     #Right Pancreatic Hydrothorax, stable  Increased dyspnea with CXR on 7/19 with further imaging and thoracentesis confirming a hepatic/pancreatic hydrothorax. Thoracentesis PRN for dyspnea.     #Alcoholic Hepatitis  #Coagulation Defect  Received 1 week of steroids at [facility] for elevated INR and altered mental status. Not a liver transplant candidate. Mental status improved with aggressive lactulose and rifaxamin, which was discontinued without return of AMS.     #Hepatic Fluid collection vs Abscess  CT Ab/pelvis from 8/13 and 8/21 showing a 3.0 x 2.7 cm hepatic fluid collection. IR consulted and recommended continue ABx without intervention until drain removed. CT imaging on 9/2 with interval improvement.      ESRD due to ATN  Stage III RADHA due to ATN from septic shock without recovery for >3 months. Line is TDC from [facility]. Nephrology dialyzed on Tu, Th, Sa schedule.  - Will need follow-up and outpatient dialysis     #Anemia due to Chronic Illness  #Hematochezia, resolved  #Hemoperitoneum  Baseline hgb 17. Running between 6-8 in setting of chronic illness, new ESRD  with epocrit per Nephrology, frequent lab draws and procedures. Hematochezia on 8/8 after starting standard intensity heparin, now resolved. Paracentesis on 8/21 with hemoperitoneum. No active bleeding.   - Goal hgb > 7.0, trend Hgb     #Alcohol Use Disorder  Counseled patient on complete alcohol cessation. Not interested in additional services at this time. Continue daily folic acid and thiamine.     #Lactic Acidosis  Intermittent Lactic acidosis in the setting of sepsis, ESRD, bleeding.     #Hypokalemia  Noted in the setting of high gastric output and improved once G-tube output decreased. CTM.      #Severe Malnutrition  Poor oral intake in the setting of necrotizing pancreatitis. PEG tube placed 7/13. Dietitian consulted and continuous tube feeds with goal of 65 ml/hr with continuous pancreatic enzymes  - Continuous tube feeds 50 mL overnight.       Diet: Regular Diet Adult  Adult Formula Drip Feeding: Continuous Vital 1.5; Jejunostomy; Goal Rate: 55; mL/hr; Medication - Feeding Tube Flush Frequency: At least 15-30 mL water before and after medication administration and with tube clogging; Amount to Send (Nutrition us...    DVT Prophylaxis: Pneumatic Compression Devices  Rdz Catheter: Not present  Central Lines: PRESENT  PICC Triple Lumen 06/28/21 Left-Site Assessment: WDL  CVC Double Lumen Right-Site Assessment: WDL  Code Status: Full Code      Disposition Plan   Expected discharge: 09/08/2021   recommended to transitional care unit once safe disposition plan/ TCU bed available. May also transfer back to [facility].     The patient's care was discussed with the Bedside Nurse, Care Coordinator/ and Patient.    Patient seen by and staffed with Dr. Joshua.     Anali Jackson MD   Med/Peds, PGY-2  Hospitalist Service, [address]  Securely message with the Vocera Web Console (learn more here)  Text page via [facility]  Paging/Directory  Please see sign in/sign out for up to date coverage information  _____________________________________________________________________    Interval History   Patient continues to be hypotensive as compared to baseline with most notable drop in his diastolic pressures. Slight improvement with fluids overnight and renal to give additional bolus during dialysis today. Thoracentesis planned - diagnostic and therapeutic. Patient will remain on IV antibiotics, consulting ID. Feeling unwell this morning, requesting antiemetic and analgesic.     The remainder of a 4 point ROS is negative unless otherwise noted above.    Data reviewed today: I reviewed all medications, new labs and imaging results over the last 24 hours.    Physical Exam   Vital Signs: Temp: 98.6  F (37  C) Temp src: Oral BP: (!) 100/29 Pulse: 113   Resp: 20 SpO2: 94 % O2 Device: Nasal cannula Oxygen Delivery: 5 LPM  Weight: 138 lbs 14.24 oz  Gen: Awake, alert, in no acute distress though uncomfortable appearing, laying in bed  HEENT: NC/AT, sclera anicteric  CV: Regular rate and rhythm  Resp: Non-labored breathing on room air, diminished breath sounds over right hemithorax as per previous.  Abd: soft, mild tenderness to palpation throughout, PEG site c/d/i  Extrem: Warm and well perfused with no edema.

## 2021-09-03 NOTE — PLAN OF CARE
"Blood pressure 91/41, pulse 118, temperature (!) 96.5  F (35.8  C), temperature source Oral, resp. rate 20, height 1.676 m (5' 6\"), weight 62.5 kg (137 lb 12.6 oz), SpO2 94 %.via 5L Oxymask .      Patient A & O X 4 , with low BP see flow sheets for more VS. patient report short of breath and needed oxygen to be increased ,   Patient Lactic acid 2.8 , RRT called and primary team update . MD order blood culture , some labs and recheck lactic acid . Recheck lactic acid 3.0 page team with result and MD order abdominal  X-ray  abdominal CT , and started with NS Bolus per MD order . Patient with low BP and MD put order for q2hr BP check. Patient with intermittent nausea and emesis . TF paused @ 18:00.  Registered Dietitian changed formal to Vital 1.5 start at 35 ml/hr and increase by 10 ml/hr q4hr. Will started after CT completed . Continue monitor closely and update MD with change.     "

## 2021-09-03 NOTE — PLAN OF CARE
"Blood pressure (!) 100/29, pulse 113, temperature 98.6  F (37  C), temperature source Oral, resp. rate 20, height 1.676 m (5' 6\"), weight 63 kg (138 lb 14.2 oz), SpO2 94 %.via 5L NC .    Patient A & O X 4 with flat affect , Tachycardic; hypotensive; asymptomatic; sat 92-94% via 5L NC. Patient  returned from HD @ ~ 16:00, patient settled to room . Patient had Thoracentesis done @ bedside by MD . Right upper posterior, dressing CDI. . Patient c/o nausea given Zofran 4mg iv x 1 , no emesis report . Left PICC and right CVC intact. Gastrostomy open to gravity , J- continue tube feeding and TF increased to 55 ml/hr that's Goal. Tube feeding flushed per order and between meds. Patient had bowel movement x 1. Call light within reach , and hourly round completed . Continue monitor closely and update MD with change.     "

## 2021-09-03 NOTE — PLAN OF CARE
"BP (!) 85/33 (BP Location: Right arm)   Pulse 109   Temp (!) 96.6  F (35.9  C) (Oral)   Resp 18   Ht 1.676 m (5' 6\")   Wt 62.5 kg (137 lb 12.6 oz)   SpO2 92%   BMI 22.24 kg/m      Assumed Cares: [id number]  Neuro: A&O x 4; flat affect; very staff seeking  Respiratory: Able to wean down to 5L via NC; sats between 89-94% with humidified O2; chest CT shows large R pleural effusion; pt denies SOB but desats quickly if not on O2; L lung clear R lung diminished  Cardiac: Tachycardic; hypotensive; asymptomatic; new order for BPs q 2 hrs  GI/: Pt on HD; no BM; pt had one episode of vomiting overnight -- given zofran x 1 for with relierf  Skin: No new skin issues noted  Lines: L triple lumen PICC SL  Pain: Pt frequently asking for pain meds; given dilaudid x 1 and atarax x 1 with scheduled meds; pt rested well and appeared comfortable all night  Diet: TF at 35ml/hr; not increased due to N/V  Activity Level: 1 assist with cares in bed; 2 assist with a lift if OOB  Events: Pt remains hypotensive; provider paged about consistently low BPs; 50cc alubmin given with little effectiveness; provider paged again with recommendations of midodrine -- awaiting response -- will pass along to AM nurse  Plan: Continue with POC; notify provider with changes in pt condition  "

## 2021-09-03 NOTE — PROCEDURES
Procedure Note:    Procedure Name: Therapeutic paracentesis  Procedurist (primary): Daryn  Pre-procedure diagnosis: hepatohydrothorax  Post-procedure diagnosis: hepatohydrothorax  Indication: possible empyema, possible malignancy    Consent was reviewed and placed in the chart.   Ultrasound used to locate ascites; optimal pocket found to be in  chart, and marked.  Universal protocol performed with nursing.   3ml of 1% lidocaine used anesthetize with 25 gauge needle.   Carbylan BioSurgeryeh catheter used to obtain fluid, which was yellow, trn in appearance, and was sent to lab.   1050 cctotal fluid obtained (limit from primary team).      Patient tolerated without any apparent complications.    Nelson Stearns MD  Med-[facility] Hospitalist

## 2021-09-03 NOTE — PROGRESS NOTES
WO Nurse Inpatient Wound Assessment   Reason for consultation: Evaluate and treat  Peg tube wounds    Assessment  Peg tube wounds due to Moisture Associated Skin Damage (MASD)  Status: healing, now hypergranular tissue present, continues to have hypergranular tissue, no improvement with hydrofera blue noted, requested silver nitrate, not available at this time,     9/3-Treated with silver nitrate stick  Treatment Plan  Peg tube wounds: Every other day   Cleanse wound bed with NS and pat dry.  Paint gera tube area with No sting film barrier  Cut a piece of Hydrofera blue (#[id number]) and split in the middle. Dampen it with NS and wring out excess  Place it under the tube. DO not add more dressing under the bumper, may add gauze on top of the bumper if needed      Orders Written  Recommended provider order: None, at this time  WO Nurse follow-up plan:weekly-   Nursing to notify the Provider(s) and re-consult the WOC Nurse if wound(s) deteriorates or new skin concern.    Patient History  According to provider note(s):  30 year old male with a history of heavy alcohol use who presented to OSH 5/19 for abdominal pain, nausea and vomiting, admitted for alcohol hepatitis, ESLD, RADHA on HD, septic shock requiring pressors as well as necrotizing pancreatitis with large evolving necrotic collection. Transferred to [facility] for consideration of drainage of presumed infected necrotic collection. S/p EUS-guided necrosectomy (7/21/21).    Objective Data  Containment of urine/stool: Continent of bladder and Continent of bowel    Active Diet Order  Orders Placed This Encounter      Regular Diet Adult      Output:   I/O last 3 completed shifts:  In: 795 [I.V.:20; NG/GT:420]  Out: 825 [Emesis/NG output:825]    Risk Assessment:   Sensory Perception: 3-->slightly limited  Moisture: 3-->occasionally moist  Activity: 2-->chairfast  Mobility: 2-->very limited  Nutrition: 2-->probably inadequate  Friction and Shear: 2-->potential problem  Tyrel  Score: 14                          Labs:   Recent Labs   Lab 09/03/21  0946 09/03/21  0523   ALBUMIN  --  2.3*   HGB  --  7.8*   INR 1.81*  --    WBC  --  15.7*       Physical Exam  Areas of skin assessed: focused peg tube site    Wound Location:  Peg tube       8/19 8/25      Date of last photo 8/19  Wound History:  7/13 - PEG-J placement with T-tag gastropexy    Wound Base: 100 % pale moist red tissue at 9 O'clock- hypergranular tissue     Palpation of the wound bed: normal      Drainage: small     Description of drainage: cloudy and tan     Measurements (length x width x depth, in cm) 0.5  x 0.8  x  0.1 cm (no significant change)  Tunneling N/A     Undermining N/A  Periwound skin: erythema- blanchable and resolving      Color: pink      Temperature: normal   Odor: none  Pain: moderate and with assessment,   Pain intervention prior to dressing change: NA    Interventions  Visual inspection and assessment completed   Wound Care Rationale Protect periwound skin, Promote moist wound healing without tissue dehydration  and Decrease bacterial load  Wound Care: done per plan of care  Supplies: ordered: Hydrofera blue  Current off-loading measures: Pillows  Current support surface: Standard  Low air loss mattress  Education provided to: plan of care and wound progress  Discussed plan of care with Patient and Nurse    Deneen Matthews RN CWOCN

## 2021-09-03 NOTE — PROGRESS NOTES
ID Orange Team Progress Note      Patient:  Dax Villareal, Date of birth 1990, Medical record number [id number]  Date of Visit:  September 3, 2021         Assessment and Recommendations:   Problem List:  1. Leukocytosis   2. Known hypotension, tachycardia   3. Right pleural effusion, known  4. Necrotizing alcoholic pancreatitis with infected pseudocyst, s/p cystogastrostomy with necrosectomies and s/p flank drain removal (08/25)  5. Septate ascites    Recommendations:  - Agree with antibiotics selected:   - IV Cefepime 1g Q24H   - IV Metronidazole 500mg Q12H  - Continue antibiotics for one week.  - Follow blood cultures, new culture if fever or WBC spike.  - Obtain urine culture and analysis if able.  - Obtain new CT abdomen/pelvis prior to discontinuing antibiotics, tentatively in one week.    Discussion:  Suspect that the leukocytosis is secondary to probable bacterial growth in abdominal fluid pocket, possibly peripancreal or ascitic septa. Despite finishing long course of cefepime and metronidazole (08/06 - 08/25) with multiple other antibiotics prior to then, it is possible that the fluid collections were not sterile and that there is growth since discontinuing antibiotics. Paracentesis would likely be unhelpful for culture because antibiotics were initiated, and the ascites are septate so a sample from one area may not reflect a sample from another septa. SBP is of concern, however, the patient does not have significant or worsening abdominal pain and he feels well. At this time would continue antibiotics and follow the patient's course, including new imaging in one week, prior to discontinuing antibiotics.    Ailyn Soto DDS  [facility] [place] PGY-1        Interval History:   Yesterday the patient developed an increasing leukocytosis with WBC 21k, triggered sepsis protocol with hypotension, tachycardia, and temp <36C. His lactate increased to 3.0. Cefepime and metronidazole were started again, after being  discontinued about one week ago. The patient did not feel significantly ill last night.  He states he's feeling fairly well this afternoon, at his recent baseline. No increased shortness of breath or abdominal pain. He coughs up mucous in the mornings, but it is not discolored or bloody. He has nausea in the mornings, but this has been ongoing. No urinary pain, does make small amounts of urine. No new skin lesions. No pain with any of his lines. 3-4 BMs per day, the most recent ones have been soft but formed. No joint pains, some back pain which improves with ambulation. New CT abdomen/pelvis demonstrates interval improvement in right pleural effusion, peripancreal fluid, and ascites. Today his WBC is improved to 15.7k and his lactate is 1.6.          HPI:   Adopted from consult note and updated:     Dax Villareal is a 30 year old male with PMHx significant for alcohol use disorder who was admitted to [facility] in [place], [place] for abdominal pain, nausea and vomiting, found to have end stage liver disease c/b hepatic encephalopathy, acute renal failure requiring iHD, SBP and acute necrotizing pancreatitis.      He required respiratory support (BIPAP, ? Intubation) for respiratory failure and appeared to be in sepsis (lactate reported at 7.7 with abnormal LFTs). He was transferred to [facility] for prolonged admission and reportedly treated with steroids; kidney function and pancreatic enzymes normal on admission). Length of stay stated >40 days. CT imaging 6/19 with concern for necrotizing pancreatitis with follow up CT scan 6/25 with peripancreatic fluid collection and 9 cm soft tissue/hemorrhagic structure c/f internal hemorrhage of a pseudocyst. Given multiple antibiotics (Cefepime, Flagyl, Ciprofloxacin, micafungin). Hospital course reportedly complicated by hypoxemic respiratory failure requiring BIPAP, ARF requiring iHD, hepatic encephalopathy, and septic shock 2/2 necrotizing  pancreatitis/pancreatic pseudocyst requiring pressor support. Ultimately transferred to [facility] for interventions 6/28. Admitted to the ICU.      Now status post perc IR drain placement 6/29 with cultures that remain NGTD/negative. No endoscopic drainage by GI was pursued given improvement in imaging 7/11. Additionally has had 3 paracenteses (7/2, 7/5, and 7/13 with negative culture work up. Had PEGJ placed 7/13.    His left flank drain was removed and his antibiotics were discontinued on 08/25.              Review of Systems:     Full 9 pt ROS obtained and negative unless noted above in assessment and interval history.         Current Antimicrobials     Current:  - Cefepime (8/6 - 8/25) (9/2 - present)  - Metronidazole (8/6 - 8/25) (9/2 - present)    Prior:  - Linezolid 7/21-7/30  - Vancomcyin 6/29  - Meropenem 6/29-7/18  - Micafungin 6/29-7/1  - Cipro 7/19-7/21  - Ceftriaxone 7/21-7/24; 7/26-7/27  - Zosyn 7/27-8/6          Physical Exam:   Ranges for vital signs:  Temp:  [95.4  F (35.2  C)-97.7  F (36.5  C)] 97.7  F (36.5  C)  Pulse:  [103-122] 103  Resp:  [16-22] 20  BP: (77-98)/(28-47) 84/37  SpO2:  [87 %-97 %] 97 %    Intake/Output Summary (Last 24 hours) at 9/3/2021 1336  Last data filed at 9/3/2021 1200  Gross per 24 hour   Intake 545 ml   Output 925 ml   Net -380 ml     Exam:  GENERAL:  Met patient in dialysis, resting in bed, alert and cooperative.   ENT:  Head is normocephalic, atraumatic. Oropharynx is moist without exudates or ulcers.  EYES:  Eyes have anicteric sclerae.    NECK:  Supple.  LUNGS:  Decreased lung sounds on right side, left side is CTA.  CARDIOVASCULAR:  Tachycardic rate and regular rhythm with no murmurs, gallops or rubs.  ABDOMEN:  Normal bowel sounds, soft, nontender. G-tube site without erythema or pain.  EXT: Extremities warm and without edema.  SKIN:  No acute rashes.  Lines are in place without any surrounding erythema or tenderness.  NEUROLOGIC:  Grossly nonfocal.         Laboratory  Data:   Reviewed.  Pertinent for:    Laboratory Data:   No results found for: ACD4    Microbiology:  Culture   Date Value Ref Range Status   09/02/2021 No growth after 12 hours  Preliminary   09/02/2021 No growth after 12 hours  Preliminary   08/20/2021 No Growth  Final   08/20/2021 No anaerobic organisms isolated  Final   08/13/2021 No Growth  Final   08/13/2021 No Growth  Final   08/09/2021 No Growth  Final   08/09/2021 No anaerobic organisms isolated  Final   07/30/2021 No Growth  Final   07/30/2021 No anaerobic organisms isolated  Final   07/30/2021 No Growth  Final   07/30/2021 No Growth  Final   07/30/2021 No Growth  Final   07/28/2021 No Growth  Final   07/28/2021 No Growth  Final   07/28/2021 No anaerobic organisms isolated  Final   07/27/2021 No Growth  Final   07/27/2021 No Growth  Final   07/26/2021 No Growth  Final   07/26/2021 No Growth  Final   07/21/2021 No Growth  Final   07/19/2021 2+ Candida albicans (A)  Final     Comment:     Susceptibilities not routinely done   07/19/2021 2+ Enterococcus faecium (A)  Final   07/16/2021 4+ Candida albicans (A)  Final     Comment:     Susceptibilities not routinely done   07/16/2021 1+ Normal fausto  Final   07/16/2021 No Growth  Final   07/16/2021 No Growth  Final   07/16/2021 No Growth  Final   07/14/2021 Culture in progress  Final   07/14/2021 4+ Enterococcus faecium VRE (A)  Final     Comment:     Preliminary result, confirmation pending.   07/14/2021 4+ Enterococcus faecium VRE (A)  Final     Comment:     Strain 2   07/14/2021 No anaerobic organisms isolated  Final   07/13/2021 No Growth  Final       Last check of C difficile  C Difficile Toxin B by PCR   Date Value Ref Range Status   08/05/2021 Negative Negative Final     Comment:     A negative result does not exclude actual disease due to C. difficile and may be due to improper collection, handling and storage of the specimen or the number of organisms in the specimen is below the detection limit of the  assay.       Virology:      No results found for: H6RES    No results found for: EBVDN, EBRES, EBVDN, EBVSP, EBVPC, EBVPCR    BK viral loads No lab results found.    Inflammatory Markers    Recent Labs   Lab Test 08/07/21  0637 08/06/21  0655 08/05/21  0536 08/04/21  0347 08/03/21  0620 08/02/21  0603 10/06/20  0927   CRP 81.0* 57.0* 76.0* 89.0* 88.0* 93.0* 16.9*       Immune Globulin Studies   No lab results found.    Metabolic Studies       Recent Labs   Lab Test 09/03/21  0523 09/02/21  2031 09/02/21  1730 09/02/21  1722 09/02/21  0545 09/01/21  0646 09/01/21  0646 08/31/21  0837 08/30/21  0533 08/29/21  0708 08/28/21  0536   *  --  124*  --   --   --  127* 127* 128*  --  128*   POTASSIUM 3.4  --  3.5  --   --   --  3.6 4.6 4.3  --  4.3   CHLORIDE 87*  --  85*  --   --   --  90* 90* 94  --  94   CO2 19*  --  21  --   --   --  25 23 22  --  24   ANIONGAP 17*  --  18*  --   --   --  12 14 12  --  10   BUN 74*  --  68*  --   --   --  35* 60* 43*  --  36*   CR 4.90*  --  4.70*  --   --   --  2.90* 4.10* 2.92*  --  2.59*   GFRESTIMATED 15*  --  15*  --   --   --  28* 18* 27*  --  32*   *  --  148*  --   --   --  106* 126* 120*  --  104*   JANENE 9.6  --  9.8  --   --   --  9.4 9.0 9.2  --  8.9   PHOS 10.9*  --   --   --  9.2*  --  7.6* 9.7* 8.0*  --  7.1*   MAG 2.7*  --   --   --  2.7*  --  2.3 2.5* 2.2  --  2.2   LACT  --  1.6  --  3.0*  --    < >  --   --   --    < >  --     < > = values in this interval not displayed.       Hepatic Studies    Recent Labs   Lab Test 09/03/21  0523 09/02/21  1730 08/31/21  0837 08/27/21  0731 08/26/21  0657 08/25/21  0637 08/14/21 2055 08/09/21  0326   BILITOTAL 2.0* 2.2* 1.7* 1.5* 1.6* 1.6*  --  2.5*   ALKPHOS 95 120 100 104 108 102  --  121   ALBUMIN 2.3* 2.7* 2.1* 2.1* 1.7* 1.7*  --  1.6*   AST 39 57* 50* 56* 48* 49*  --  41   ALT 31 40 31 24 23 20  --  22   LDH  --   --   --   --   --   --  169 191       Pancreatitis testing    Recent Labs   Lab Test 09/02/21 2031  06/29/21  0410 06/28/21  1839 05/18/21  1220 12/09/20  1504 10/06/20  0927   LIPASE 251  --  199 381 140 139   TRIG  --  Unsatisfactory specimen - icteric Canceled, Test credited  --   --   --        Hematology Studies      Recent Labs   Lab Test 09/03/21  0523 09/02/21  1730 08/31/21  0837 08/28/21  0536 08/27/21  0731 08/26/21  0657 08/09/21  1312 08/02/21  1720 08/02/21  0603 06/29/21  0410 06/28/21  1839 05/18/21  1220 12/09/20  1504   WBC 15.7* 21.4* 9.6 11.5* 11.5* 11.9* 15.1* 15.6* 15.7*  15.7*   < > 50.6* 13.2* 4.2   ANEU  --   --   --   --   --   --  11.6* 12.0* 11.9*  --  44.0* 12.0* 2.1   ALYM  --   --   --   --   --   --  0.6* 0.8 1.3  --  0.0* 0.6* 1.3   MEDHAT  --   --   --   --   --   --  1.8* 2.2* 2.2*  --  2.0* 0.4 0.6   AEOS  --   --   --   --   --   --  0.2 0.3 0.0  --  0.5 0.0 0.2   HGB 7.8* 9.3* 7.8* 8.2* 8.3* 8.6* 8.3* 8.1* 8.1*  8.1*   < > 7.6* 16.6 17.2   HCT 24.4* 29.2* 24.7* 26.2* 27.3* 27.5* 24.8* 25.0* 24.8*  24.8*   < > 23.4* 44.8 47.7    386 336 363 343 365 290 125* 110*  110*   < > 127* 222 299    < > = values in this interval not displayed.       Arterial Blood Gas Testing    Recent Labs   Lab Test 09/02/21  1730 07/01/21  0014 06/30/21  1716 06/28/21  1839   O2PER 5 60.0 5L 2L        Urine Studies     Recent Labs   Lab Test 12/09/20  1817 10/06/20  1214   URINEPH 7.0 7.0   NITRITE Negative Negative   LEUKEST Negative Negative   WBCU 0 1       Vancomycin Levels     Recent Labs   Lab Test 06/30/21  0700   VANCOMYCIN 31.2*       Tobramycin levels     No lab results found.    Gentamicin levels    No lab results found.    Tacrolimus levels    Invalid input(s): TACROLIMUS, TAC, TACR  Transplant Immunosuppression Labs Latest Ref Rng & Units 9/3/2021 9/2/2021 9/1/2021 8/31/2021 8/30/2021   Creat 0.66 - 1.25 mg/dL 4.90(H) 4.70(H) 2.90(H) 4.10(H) 2.92(H)   BUN 7 - 30 mg/dL 74(H) 68(H) 35(H) 60(H) 43(H)   WBC 4.0 - 11.0 10e3/uL 15.7(H) 21.4(H) - 9.6 -   Neutrophil % - - - - -   ANEU  1.6 - 8.3 10e3/uL - - - - -       Cyclosporine levels    Invalid input(s): CYCLOSPORINE, CYC    Mycophenolate levels    Invalid input(s): MYPA, MYP    Sirolimus levels    Invalid input(s): SIROLIMUS, SIR, RAPA    CSF testing   No lab results found.         Imaging:   XR Chest Port 1 View    Result Date: 9/2/2021  EXAM: XR CHEST PORT 1 VIEW  9/2/2021 5:24 PM HISTORY:  low O2 SATS   COMPARISON:  8/14/2021 FINDINGS: Portable supine radiograph of the chest. Right IJ CVC and left upper extremity PICC tip project over the low SVC. The trachea is midline. The cardiac silhouette is partially obscured. Large right pleural effusion with near total atelectasis of the right lung. No pneumothorax. The left lung is clear. The upper abdomen is unremarkable. Osseous structures are unchanged.     IMPRESSION: 1. Large right pleural effusion with near total right lung atelectasis. 2. The left lung is clear. I have personally reviewed the examination and initial interpretation and I agree with the findings. BRIAN SANCHEZ MD   SYSTEM ID:  [id number]    XR Abdomen Port 1 View    Result Date: 9/2/2021  EXAMINATION:  XR ABDOMEN PORT 1 VIEWS 9/2/2021 7:11 PM INDICATION: Elevated LA and increase in nausea. Please eval for acute patho COMPARISON: CT abdomen pelvis 8/23/2021. FINDINGS: Single AP supine view of the abdomen. The small intestine and colon are nondistended. Cystogastrostomy tube over the left upper quadrant. Gastrojejunostomy tube tip projected in the left lower quadrant. Ascites. No evidence of pneumatosis or portal venous gas. Limited evaluation of intra-abdominal free air due to supine position. Right-sided pleural effusion.     IMPRESSION: 1. Nonobstructive bowel gas pattern. Stable position of cystogastrostomy and gastrojejunostomy tube. 2. Moderate right-sided pleural effusion. I have personally reviewed the examination and initial interpretation and I agree with the findings. BRIAN SANCHEZ MD   SYSTEM ID:  [id number]    CT  Abdomen Pelvis w Contrast    Result Date: 9/2/2021  EXAMINATION: CT ABDOMEN PELVIS W CONTRAST  9/2/2021 8:19 PM  HISTORY: Abdominal abscess/infection suspected COMPARISON: CT abdomen and pelvis 8/23/2021 and 8/21/2021 PROCEDURE COMMENTS: CT of the abdomen and pelvis was performed with iopamidol ([id number]) solution 84 mL intravenous contrast. Coronal and sagittal reformatted images were obtained. FINDINGS: Stable percutaneous gastrojejunostomy tube with tip in the proximal jejunum. Interval removal of left lateral approach abdominal drain. Unchanged cystogastrostomy tube. Lower thorax: Slight decrease in large right pleural effusion with associated compressive atelectasis. Stable 10 mm subpleural pulmonary nodule in the lingula. The left lung is clear. Abdomen and pelvis: Unchanged hepatosplenomegaly. Decreased hypoattenuating lesion in segment 2, which measures 2.1 x 1.5 cm, previously measuring 2.1 x 2.0 cm. Scattered subcentimeter cystic hypodensities within the liver, likely representing simple hepatic cysts. No new focal liver lesions. Gallbladder is unremarkable. Interval decrease in 6.8 x 0.8 cm peripancreatic fluid collection with cyst gastrostomy tube in place and interval removal of left lateral approach abdominal drain, previously measuring 8.6 x 2.0 cm. Slight decrease in fluid collection adjacent to the pancreatic head measuring 2.8 x 1.6 cm, previously measuring 3.1 x 2.0 cm. Decrease fluid collection along the inferior aspect of the pancreas measuring 3.4 x 2.2 cm, previously measuring 4.1 x 2.7 cm. The adrenal glands are unremarkable. Edematous and poorly enhancing bilateral kidneys are similar to appearance from prior exam. No nephrolithiasis. Urinary bladder is decompressed. Decreased loculated ascites with persistence peritoneal enhancement and thickening which is seen within the pelvis and tracks up into the right pericolic gutter. Largest pocket in the pelvis measuring 6.3 x 5.8 cm, previously  measuring 9.6 x 8.1 cm. No abnormally dilated loops of large and small bowel. No evidence of bowel obstruction. No new fluid collection. No inguinal lymphadenopathy. Redemonstration of multiple prominent borderline enlarged mesenteric lymph nodes, likely reactive in nature. The major intra-abdominal vasculature is widely patent. Multiple perisplenic varicosities seen in the left upper abdomen. Mild anasarca. Bones: No suspicious or aggressive appearing bone lesions.     IMPRESSION: 1. Sequela of necrotizing pancreatitis with interval decrease in multiple peripancreatic fluid collections with interval removal of left lateral approach large-bore drain and stable positioning of cystogastrostomy tube. No new intra-abdominal fluid collections. 2. Decreased loculated ascites. 3. Decreased large right pleural effusion with associated compressive atelectasis. 4. Stable appearance of edematous and poorly enhancing kidneys. 5. Slight decrease in hypoattenuating lesion within segment 2 of the liver. I have personally reviewed the examination and initial interpretation and I agree with the findings. BRIAN SANCHEZ MD   SYSTEM ID:  [id number]

## 2021-09-03 NOTE — PLAN OF CARE
Patient A & O X 4 with flat affect , Tachycardic; hypotensive; asymptomatic; sat 92-94% via 5L NC . Patient received Albumin 5% 25gm 500 ml x 1 over 2hr per MD order.  Received scheduled medications as ordered. C/o back pain  given scheduled oxycodone and Dilaudid  x 1 PRN , post assessment with relief.  G-open to gravity, J tube continuous tube feeding Tube feeding increased to 45 ml/hr and goal 55 ml/hr flushed with water. Gastrostomy out put 825 on this shift. Patient left to Dialysis @ ~ 12:30 PM via bed escorted by transport . Report given to HD RN. Continue carry on as order put in and update MD with change.

## 2021-09-04 LAB
ABO/RH(D): NORMAL
ALBUMIN SERPL-MCNC: 2.9 G/DL (ref 3.4–5)
ALP SERPL-CCNC: 81 U/L (ref 40–150)
ALT SERPL W P-5'-P-CCNC: 29 U/L (ref 0–70)
ANION GAP SERPL CALCULATED.3IONS-SCNC: 12 MMOL/L (ref 3–14)
ANTIBODY SCREEN: NEGATIVE
AST SERPL W P-5'-P-CCNC: 38 U/L (ref 0–45)
BILIRUB SERPL-MCNC: 1.4 MG/DL (ref 0.2–1.3)
BLD PROD TYP BPU: NORMAL
BLOOD COMPONENT TYPE: NORMAL
BUN SERPL-MCNC: 32 MG/DL (ref 7–30)
CALCIUM SERPL-MCNC: 8.3 MG/DL (ref 8.5–10.1)
CHLORIDE BLD-SCNC: 94 MMOL/L (ref 94–109)
CO2 SERPL-SCNC: 22 MMOL/L (ref 20–32)
CODING SYSTEM: NORMAL
CREAT SERPL-MCNC: 2.78 MG/DL (ref 0.66–1.25)
CROSSMATCH: NORMAL
ERYTHROCYTE [DISTWIDTH] IN BLOOD BY AUTOMATED COUNT: 16.8 % (ref 10–15)
GFR SERPL CREATININE-BSD FRML MDRD: 29 ML/MIN/1.73M2
GLUCOSE BLD-MCNC: 121 MG/DL (ref 70–99)
HCT VFR BLD AUTO: 20.8 % (ref 40–53)
HGB BLD-MCNC: 6.8 G/DL (ref 13.3–17.7)
HGB BLD-MCNC: 8 G/DL (ref 13.3–17.7)
HOLD SPECIMEN: NORMAL
ISSUE DATE AND TIME: NORMAL
LACTATE SERPL-SCNC: 1.8 MMOL/L (ref 0.7–2)
MAGNESIUM SERPL-MCNC: 2 MG/DL (ref 1.6–2.3)
MCH RBC QN AUTO: 30.5 PG (ref 26.5–33)
MCHC RBC AUTO-ENTMCNC: 32.7 G/DL (ref 31.5–36.5)
MCV RBC AUTO: 93 FL (ref 78–100)
PHOSPHATE SERPL-MCNC: 4.4 MG/DL (ref 2.5–4.5)
PLATELET # BLD AUTO: 257 10E3/UL (ref 150–450)
POTASSIUM BLD-SCNC: 3.1 MMOL/L (ref 3.4–5.3)
PROT SERPL-MCNC: 8.8 G/DL (ref 6.8–8.8)
RBC # BLD AUTO: 2.23 10E6/UL (ref 4.4–5.9)
SODIUM SERPL-SCNC: 128 MMOL/L (ref 133–144)
SPECIMEN EXPIRATION DATE: NORMAL
UNIT ABO/RH: NORMAL
UNIT NUMBER: NORMAL
UNIT STATUS: NORMAL
UNIT TYPE ISBT: 7300
WBC # BLD AUTO: 9.8 10E3/UL (ref 4–11)

## 2021-09-04 PROCEDURE — P9047 ALBUMIN (HUMAN), 25%, 50ML: HCPCS | Performed by: PHYSICIAN ASSISTANT

## 2021-09-04 PROCEDURE — P9016 RBC LEUKOCYTES REDUCED: HCPCS | Performed by: STUDENT IN AN ORGANIZED HEALTH CARE EDUCATION/TRAINING PROGRAM

## 2021-09-04 PROCEDURE — 250N000013 HC RX MED GY IP 250 OP 250 PS 637: Performed by: INTERNAL MEDICINE

## 2021-09-04 PROCEDURE — 36592 COLLECT BLOOD FROM PICC: CPT | Performed by: STUDENT IN AN ORGANIZED HEALTH CARE EDUCATION/TRAINING PROGRAM

## 2021-09-04 PROCEDURE — 250N000013 HC RX MED GY IP 250 OP 250 PS 637: Performed by: STUDENT IN AN ORGANIZED HEALTH CARE EDUCATION/TRAINING PROGRAM

## 2021-09-04 PROCEDURE — 36592 COLLECT BLOOD FROM PICC: CPT

## 2021-09-04 PROCEDURE — 258N000003 HC RX IP 258 OP 636: Performed by: STUDENT IN AN ORGANIZED HEALTH CARE EDUCATION/TRAINING PROGRAM

## 2021-09-04 PROCEDURE — 250N000011 HC RX IP 250 OP 636

## 2021-09-04 PROCEDURE — 99233 SBSQ HOSP IP/OBS HIGH 50: CPT | Mod: GC | Performed by: PEDIATRICS

## 2021-09-04 PROCEDURE — 258N000003 HC RX IP 258 OP 636

## 2021-09-04 PROCEDURE — C9113 INJ PANTOPRAZOLE SODIUM, VIA: HCPCS | Performed by: INTERNAL MEDICINE

## 2021-09-04 PROCEDURE — 120N000002 HC R&B MED SURG/OB UMMC

## 2021-09-04 PROCEDURE — 250N000011 HC RX IP 250 OP 636: Performed by: STUDENT IN AN ORGANIZED HEALTH CARE EDUCATION/TRAINING PROGRAM

## 2021-09-04 PROCEDURE — 250N000011 HC RX IP 250 OP 636: Performed by: PHYSICIAN ASSISTANT

## 2021-09-04 PROCEDURE — 85018 HEMOGLOBIN: CPT | Performed by: STUDENT IN AN ORGANIZED HEALTH CARE EDUCATION/TRAINING PROGRAM

## 2021-09-04 PROCEDURE — 84100 ASSAY OF PHOSPHORUS: CPT | Performed by: INTERNAL MEDICINE

## 2021-09-04 PROCEDURE — 83605 ASSAY OF LACTIC ACID: CPT

## 2021-09-04 PROCEDURE — 99207 PR NO CHARGE LOS: CPT | Performed by: STUDENT IN AN ORGANIZED HEALTH CARE EDUCATION/TRAINING PROGRAM

## 2021-09-04 PROCEDURE — 99233 SBSQ HOSP IP/OBS HIGH 50: CPT | Mod: 24 | Performed by: INTERNAL MEDICINE

## 2021-09-04 PROCEDURE — 250N000013 HC RX MED GY IP 250 OP 250 PS 637: Performed by: PEDIATRICS

## 2021-09-04 PROCEDURE — 86850 RBC ANTIBODY SCREEN: CPT | Performed by: STUDENT IN AN ORGANIZED HEALTH CARE EDUCATION/TRAINING PROGRAM

## 2021-09-04 PROCEDURE — 99207 PR APP CREDIT; MD BILLING SHARED VISIT: CPT | Performed by: PHYSICIAN ASSISTANT

## 2021-09-04 PROCEDURE — 250N000011 HC RX IP 250 OP 636: Performed by: INTERNAL MEDICINE

## 2021-09-04 PROCEDURE — 85027 COMPLETE CBC AUTOMATED: CPT | Performed by: STUDENT IN AN ORGANIZED HEALTH CARE EDUCATION/TRAINING PROGRAM

## 2021-09-04 PROCEDURE — 80053 COMPREHEN METABOLIC PANEL: CPT | Performed by: STUDENT IN AN ORGANIZED HEALTH CARE EDUCATION/TRAINING PROGRAM

## 2021-09-04 PROCEDURE — 83735 ASSAY OF MAGNESIUM: CPT | Performed by: INTERNAL MEDICINE

## 2021-09-04 PROCEDURE — 86923 COMPATIBILITY TEST ELECTRIC: CPT | Performed by: STUDENT IN AN ORGANIZED HEALTH CARE EDUCATION/TRAINING PROGRAM

## 2021-09-04 RX ORDER — POTASSIUM CHLORIDE 20MEQ/15ML
40 LIQUID (ML) ORAL ONCE
Status: COMPLETED | OUTPATIENT
Start: 2021-09-04 | End: 2021-09-04

## 2021-09-04 RX ORDER — ALBUMIN (HUMAN) 12.5 G/50ML
25 SOLUTION INTRAVENOUS ONCE
Status: COMPLETED | OUTPATIENT
Start: 2021-09-04 | End: 2021-09-04

## 2021-09-04 RX ADMIN — Medication 1 PACKET: at 08:10

## 2021-09-04 RX ADMIN — PANCRELIPASE 2 CAPSULE: 24000; 76000; 120000 CAPSULE, DELAYED RELEASE PELLETS ORAL at 17:13

## 2021-09-04 RX ADMIN — FOLIC ACID 1 MG: 1 TABLET ORAL at 08:07

## 2021-09-04 RX ADMIN — PANCRELIPASE 2 CAPSULE: 24000; 76000; 120000 CAPSULE, DELAYED RELEASE PELLETS ORAL at 20:43

## 2021-09-04 RX ADMIN — METRONIDAZOLE 500 MG: 500 INJECTION, SOLUTION INTRAVENOUS at 20:44

## 2021-09-04 RX ADMIN — Medication 15 ML: at 17:14

## 2021-09-04 RX ADMIN — PANTOPRAZOLE SODIUM 40 MG: 40 INJECTION, POWDER, FOR SOLUTION INTRAVENOUS at 08:07

## 2021-09-04 RX ADMIN — SODIUM BICARBONATE 325 MG: 325 TABLET ORAL at 20:44

## 2021-09-04 RX ADMIN — POTASSIUM CHLORIDE 40 MEQ: 40 SOLUTION ORAL at 10:19

## 2021-09-04 RX ADMIN — PANCRELIPASE 2 CAPSULE: 24000; 76000; 120000 CAPSULE, DELAYED RELEASE PELLETS ORAL at 02:14

## 2021-09-04 RX ADMIN — VANCOMYCIN HYDROCHLORIDE 1500 MG: 100 INJECTION, POWDER, LYOPHILIZED, FOR SOLUTION INTRAVENOUS at 04:12

## 2021-09-04 RX ADMIN — METRONIDAZOLE 500 MG: 500 INJECTION, SOLUTION INTRAVENOUS at 08:07

## 2021-09-04 RX ADMIN — Medication 1 PACKET: at 08:12

## 2021-09-04 RX ADMIN — THIAMINE HCL TAB 100 MG 100 MG: 100 TAB at 08:06

## 2021-09-04 RX ADMIN — SODIUM BICARBONATE 325 MG: 325 TABLET ORAL at 17:13

## 2021-09-04 RX ADMIN — OXYCODONE HYDROCHLORIDE 5 MG: 5 SOLUTION ORAL at 22:27

## 2021-09-04 RX ADMIN — ONDANSETRON 4 MG: 2 INJECTION INTRAMUSCULAR; INTRAVENOUS at 00:33

## 2021-09-04 RX ADMIN — OXYCODONE HYDROCHLORIDE 5 MG: 5 SOLUTION ORAL at 08:09

## 2021-09-04 RX ADMIN — Medication 5 ML: at 20:24

## 2021-09-04 RX ADMIN — PANCRELIPASE 2 CAPSULE: 24000; 76000; 120000 CAPSULE, DELAYED RELEASE PELLETS ORAL at 08:04

## 2021-09-04 RX ADMIN — ALBUMIN HUMAN 12.5 G: 0.25 SOLUTION INTRAVENOUS at 00:16

## 2021-09-04 RX ADMIN — SODIUM BICARBONATE 325 MG: 325 TABLET ORAL at 12:31

## 2021-09-04 RX ADMIN — HYDROMORPHONE HYDROCHLORIDE 1 MG: 1 SOLUTION ORAL at 00:33

## 2021-09-04 RX ADMIN — SODIUM CHLORIDE 500 ML: 9 INJECTION, SOLUTION INTRAVENOUS at 18:59

## 2021-09-04 RX ADMIN — SODIUM BICARBONATE 325 MG: 325 TABLET ORAL at 02:15

## 2021-09-04 RX ADMIN — HYDROMORPHONE HYDROCHLORIDE 1 MG: 1 SOLUTION ORAL at 10:20

## 2021-09-04 RX ADMIN — SODIUM CHLORIDE 500 ML: 9 INJECTION, SOLUTION INTRAVENOUS at 10:21

## 2021-09-04 RX ADMIN — MELATONIN TAB 3 MG 6 MG: 3 TAB at 21:58

## 2021-09-04 RX ADMIN — OXYCODONE HYDROCHLORIDE 5 MG: 5 SOLUTION ORAL at 17:13

## 2021-09-04 RX ADMIN — PANCRELIPASE 2 CAPSULE: 24000; 76000; 120000 CAPSULE, DELAYED RELEASE PELLETS ORAL at 12:31

## 2021-09-04 RX ADMIN — Medication 1 PACKET: at 20:43

## 2021-09-04 RX ADMIN — Medication 5 ML: at 08:09

## 2021-09-04 RX ADMIN — CEFEPIME HYDROCHLORIDE 1 G: 1 INJECTION, POWDER, FOR SOLUTION INTRAMUSCULAR; INTRAVENOUS at 20:44

## 2021-09-04 RX ADMIN — ALBUMIN HUMAN 25 G: 0.25 SOLUTION INTRAVENOUS at 03:02

## 2021-09-04 RX ADMIN — SODIUM BICARBONATE 325 MG: 325 TABLET ORAL at 08:04

## 2021-09-04 ASSESSMENT — ACTIVITIES OF DAILY LIVING (ADL)
ADLS_ACUITY_SCORE: 17
ADLS_ACUITY_SCORE: 18
ADLS_ACUITY_SCORE: 17

## 2021-09-04 ASSESSMENT — PAIN DESCRIPTION - DESCRIPTORS: DESCRIPTORS: ACHING;SORE

## 2021-09-04 NOTE — PROVIDER NOTIFICATION
High. 5B. M3. 5236. K.R. Re: sepsis update. Albumin given at 0016. 0200 .8, 22, 110, 86/28, 93% 6L O2. Talked to the VENUS Rodrigez. She looked in on him. STAT lactic acid ordered. She is ordering 25 gm Albumin. Mae RIVAS. [id number]. [phone].    Text page sent to Dr. Lozada [phone] via [place].

## 2021-09-04 NOTE — PROGRESS NOTES
Rapid Response Team Note    Assessment   In assessment a rapid response was called on Dax Villareal due to Hyperlactatemia w/ LA 2.2. Upon bedside evaluation, patient sitting up in bed, conversant. No acute complaints. BP stable. Tachycardia, though per recent BL. Breathing mildly labored on 5L via NC- no report of change from recent BL. No fever, chills, HA, abdominal pain, cough, CP. POC as below:      Plan   -  Give 12.5g of 25% albumin x1  -  Repeat LA for trend in am unless with significant clinical changes  -  The Internal Medicine primary team was able to be reached and they are in agreement with the above plan.  -  Disposition: The patient will remain on the current unit. We will continue to monitor this patient closely.  -  Reassessment and plan follow-up will be performed by the primary team      Raegan Virk PA-C  [facility] RRT AMCOM Job Code Contact #[phone]    Hospital Course   Brief Summary of events leading to rapid response:   Dax Villareal is a 30 year old male with h/o ETOH hepatitis, end stage liver disease, RADHA on HD, transferred from [facility] in [place] for septic shock on 21 for treatment of necrotizing pancreatitis and decompensated liver disease. He was initially admitted to [facility] 21 and started on RRT 2021. RRT called for Hyperlactatemia w/ LA 2.2 (1.6). Hu as above.     Admission Diagnosis:   Pancreatitis [K85.90]     Physical Exam   Temp: 100.3  F (37.9  C) Temp  Min: 95.4  F (35.2  C)  Max: 101  F (38.3  C)  Resp: 20 Resp  Min: 10  Max: 28  SpO2: 94 % SpO2  Min: 88 %  Max: 98 %  Pulse: (!) 125 Pulse  Min: 103  Max: 125    No data recorded  BP: (!) 100/31 Systolic (24hrs), Av , Min:81 , Max:107   Diastolic (24hrs), Av, Min:24, Max:78     I/Os: I/O last 3 completed shifts:  In: 1510 [Other:600; NG/GT:400]  Out: 975 [Emesis/NG output:975]     Exam:   General: chronically ill appearing  Mental Status: baseline mental status.  Resp: breathing non-labored on 6L  via NC. No wheeze or rhonchi noted. Diminished/absent on Right   CV: Tachycardia, though no m/r/g  GI: Abdomen distended, though soft. No rebound or guarding.  Skin: Jaundice    Significant Results and Procedures   Lactic Acid:   Recent Labs   Lab Test 09/03/21  2150 09/02/21  2031 09/02/21  1722 07/09/21  2215 07/08/21  2116 07/07/21  2106 07/06/21  2106   LACT 2.2* 1.6 3.0*   < >  --   --   --    LACTS  --   --   --   --  1.1 0.9 1.5    < > = values in this interval not displayed.     CBC:   Recent Labs   Lab Test 09/03/21  0523 09/02/21  1730 08/31/21  0837   WBC 15.7* 21.4* 9.6   HGB 7.8* 9.3* 7.8*   HCT 24.4* 29.2* 24.7*    386 336        Sepsis Evaluation   The patient is not known to have an infection.  NO EVIDENCE OF SEPSIS at this time.  Vital sign, physical exam, and lab findings are due to ESRD on HD, ESLD.

## 2021-09-04 NOTE — PROVIDER NOTIFICATION
09/03/21 2300   Call Information   Date of Call 09/03/21   Time of Call 2322   Name of person requesting the team Dariana LEGGETT   Title of person requesting team RN   RRT Arrival time 2325   Time RRT ended 2335   Reason for call   Type of RRT Adult   Primary reason for call Sepsis suspected   Sepsis Suspected Elevated Lactate level;Heart Rate > 100   Was patient transferred from the ED, ICU, or PACU within last 24 hours prior to RRT call? No   SBAR   Situation LA 2.2   Background 30 year old male with hx of alcohol use disorder admitted on 6/28/2021 after recent prolonged admission at [facility] who has severe acute alcoholic hepatitis c/b HE, ESRD requiring HD, and malnutrition in the setting of acute necrotizing pancreatitis, s/p PEG-J placement on 7/13. He arrived with acute respiratory failure and septic shock that have resolved, and his secondary bacterial peritonitis has been appropriately treated. He requires continued management for ESRD, hepatic encephalopathy, infected pancreatic pseudocyst, nutritional support via PEG-J, and persistent right pleural effusion.   Notable History/Conditions End-Stage disease;Organ failure   Assessment A+Ox3, tachycardic   Interventions Meds   Patient Outcome   Patient Outcome Stabilized on unit   RRT Team   Attending/Primary/Covering Physician Daniela 3   Date Attending Physician notified 09/03/21   Time Attending Physician notified 2322   Physician(s) Raegan DONOVAN   Lead RN Rafael FISCHER/Mae SCOTT   RT n/a   Post RRT Intervention Assessment   Post RRT Assessment Stable/Improved   Date Follow Up Done 09/04/21   Time Follow Up Done 0139   Comments pt. sleeping

## 2021-09-04 NOTE — PROGRESS NOTES
Nephrology Progress Note  09/04/2021         Dax Villareal is a 30 year old male with h/o ETOH hepatitis, end stage liver disease, RADHA on HD, transferred from [facility] in [place] for septic shock on 6/28/21 for treatment of necrotizing pancreatitis and decompensated liver disease. He was initially admitted to [facility] 5/19/21 and started on RRT 5/26/2021.        Interval History :   Mr Villareal had necrosectomy for 4th time on 8/11 with no more anticipated.  Had HD yesterday  (9/3) with NO UF.     Remains hypotensive.  Lactic acidosis,  Is in cefepime + metronidazole per ID.      Assessment & Recommendations:   ESRD-Cr was 0.8 as recently as 5/19/2021 but now HD dependent for 3 months, started RRT at [facility] prior to transfer to [facility] on 5/26.  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  continuing restorative cares for now, BP's have been stable enough to support HD.  Had GI stenting of pancreas x4.                 -Line is TDC from PTA               - No indication for HD today.   HD will be limited by hypotension.  Will reassess needs daily.      Electrolytes/pH-  Stop bicarb tab. Dialylsis should take care of his acidemia. If not (due to diarrhea etc) he would do better on baking soda as this is easily dissolved and can be put in with his tube feeds (1 tsp provides ~8x the amount of 1 bicarbonate tablet)     Ca/phos/pth-     Add sevelemer powder at start of tube feeds and end tube feeds if overnight or TID if continuous    Anemia-Hgb low at 7.8, acute management per team, last PRBC's 7/14.  on EPO 5k with HD, iron sats 43%        Recommendations were communicated to primary team via note  Ladarius Reid MD  Division of Nephrology and Hypertension  Pager: [phone]  September 4, 2021  11:03 AM       Review of Systems:   I reviewed the following systems:  Gen: No fevers or chills  CV: No CP at rest  Resp: No SOB at rest  GI:  "ongoing nausea and vomiting    Physical Exam:   I/O last 3 completed shifts:  In: 2820 [P.O.:400; I.V.:270; Other:600; NG/GT:600]  Out: 975 [Emesis/NG output:975]   BP (!) 87/26 (BP Location: Right arm)   Pulse 111   Temp 96.8  F (36  C) (Axillary)   Resp 20   Ht 1.676 m (5' 6\")   Wt 63 kg (138 lb 14.2 oz)   SpO2 94%   BMI 22.42 kg/m       GENERAL APPEARANCE: Lying in bed, clearly distressed and vomiting  EYES:  scleral icterus, pupils equal  Pulmonary: decreased BS   CV: tachy   - Edema - no LE edema  GI: mild distention, non-tender. Has pancreatic drain with large amounts of yellowish clear non purulent fluid  MS: no evidence of inflammation in joints, no muscle tenderness  SKIN: no rash, warm, dry, no cyanosis  NEURO: alert/interactive  ACCESS: Right TDC with no induration or erythema    Labs:   All labs reviewed by me  Electrolytes/Renal -   Recent Labs   Lab Test 09/04/21 0414 09/03/21 0523 09/02/21 1730 09/02/21  0545   * 123* 124*  --    POTASSIUM 3.1* 3.4 3.5  --    CHLORIDE 94 87* 85*  --    CO2 22 19* 21  --    BUN 32* 74* 68*  --    CR 2.78* 4.90* 4.70*  --    * 146* 148*  --    JANENE 8.3* 9.6 9.8  --    MAG 2.0 2.7*  --  2.7*   PHOS 4.4 10.9*  --  9.2*       CBC -   Recent Labs   Lab Test 09/03/21 0523 09/02/21  1730 08/31/21  0837   WBC 15.7* 21.4* 9.6   HGB 7.8* 9.3* 7.8*    386 336       LFTs -   Recent Labs   Lab Test 09/04/21 0414 09/03/21 0523 09/02/21  1730   ALKPHOS 81 95 120   BILITOTAL 1.4* 2.0* 2.2*   ALT 29 31 40   AST 38 39 57*   PROTTOTAL 8.8 9.2* 10.7*   ALBUMIN 2.9* 2.3* 2.7*       Iron Panel -   Recent Labs   Lab Test 08/14/21 2055 07/19/21  0632   IRON 28* 31*   IRONSAT 21 43   JERRELL 2,186*  --            Current Medications:    sodium chloride 0.9%  500 mL Intravenous Once     sodium bicarbonate  325 mg Per Feeding Tube Q4H    And     amylase-lipase-protease  1-2 capsule Per Feeding Tube Q4H     amylase-lipase-protease  2 capsule Oral TID w/meals     B and " C vitamin Complex with folic acid  5 mL Per Feeding Tube Daily     banatrol plus  1 packet Per Feeding Tube TID     ceFEPIme (MAXIPIME) IV  1 g Intravenous Q24H     folic acid  1 mg Oral Daily     heparin lock flush  5-20 mL Intracatheter Q24H     melatonin  6 mg Oral At Bedtime     menthol   Transdermal Q8H     metroNIDAZOLE  500 mg Intravenous Q12H     oxyCODONE  5 mg Oral Q8H     pantoprazole (PROTONIX) IV  40 mg Intravenous Daily with breakfast     protein modular  1 packet Per Feeding Tube Daily     sevelamer carbonate (RENVELA)  0.8 g Oral TID w/meals     sodium chloride (PF)  3 mL Intracatheter Q8H     sodium chloride (PF)  3 mL Intracatheter Q8H     sodium chloride (PF)  3 mL Intracatheter Q8H     vitamin B1  100 mg Oral Daily    Or     thiamine  100 mg Intravenous Daily     vancomycin place smith - receiving intermittent dosing  1 each Intravenous See Admin Instructions       - MEDICATION INSTRUCTIONS -       dextrose       heparin (porcine) 1,000 Units/hr (08/24/21 0907)

## 2021-09-04 NOTE — PROGRESS NOTES
Infectious Diseases Brief Note    Chart reviewed, patient not examined today.    Noted fever overnight, as well as lactic acidosis, both of which are now resolved. Agree with continuing current antibiotics (cefepime, metronidazole, vancomycin) while we monitor cultures (blood culture, thoracentesis cultures), all of which currently show no growth to date.     If any new episodes of fever or leukocytosis occur, would recommend repeating blood cultures and (if possible, depending on output) urine culture.     Gaurav Avila MD  Division of Infectious Diseases and International Medicine  P: [phone]

## 2021-09-04 NOTE — PROGRESS NOTES
RRT Brief Follow-Up Note    Pt was seen yesterday evening @ 2330 for RRT for lactic acid of 2.2, triggered by new fever and leukocytosis. Blood cultures were obtained. Pt was started on cefepime and metronidazole yesterday per ID recs for necrotizing alcoholic pancreatitis w/ infected pseudocyst and SBP w/ VRE. He was hemodynamically stable at that time and given albumin 25% 12.5gm w/ order to repeat LA @ 0330.     Pt oxygen needs have increased slightly from 5L to 6L. He has been hypotensive w/ BP's 80s/20s.     On exam, pt is noted to be a very deep sleeper but able to be aroused. Of note, his O2 sats improved from 92% to 95% on waking him. He reports his breathing feels better. He denies other complaints at this time.      Plan:   - Will go ahead and obtain 0330 LA now.   - Give albumin 25% 25gm now.   - Primary team, Dr. Tri Lozada, was notified of the above and will follow-up on BP after albumin and recheck lactate result.      Deepali Rodrigez PA-C  Hospitalist Service  RRT pager: page job [phone] via [facility]

## 2021-09-04 NOTE — PROVIDER NOTIFICATION
Provider notified (name): Daniela Tena 3  Reason for notification: BP 94/29. Receiving blood now. Other interventions?    Response from provider: Recheck BP when blood is done. Notify MD if SBP <80 or DBP <25. Hgb recheck order placed.

## 2021-09-04 NOTE — PHARMACY-VANCOMYCIN DOSING SERVICE
Pharmacy Vancomycin Initial Note  Date of Service 2021  Patient's  1990  30 year old, male    Indication: Sepsis    Current estimated CrCl = Estimated Creatinine Clearance: 19.5 mL/min (A) (based on SCr of 4.9 mg/dL (H)).    Creatinine for last 3 days  2021:  6:46 AM Creatinine 2.90 mg/dL  2021:  5:30 PM Creatinine 4.70 mg/dL  9/3/2021:  5:23 AM Creatinine 4.90 mg/dL    Recent Vancomycin Level(s) for last 3 days  No results found for requested labs within last 72 hours.      Vancomycin IV Administrations (past 72 hours)      No vancomycin orders with administrations in past 72 hours.                Nephrotoxins and other renal medications (From now, onward)    Start     Dose/Rate Route Frequency Ordered Stop    21 0330  vancomycin 1500 mg in 0.9% NaCl 250 ml intermittent infusion 1,500 mg      1,500 mg  over 90 Minutes Intravenous ONCE 21 0305      21 0305  vancomycin place smith - receiving intermittent dosing      1 each Intravenous SEE ADMIN INSTRUCTIONS 21 0305            Contrast Orders - past 72 hours (72h ago, onward)    Start     Dose/Rate Route Frequency Ordered Stop    21 1900  iopamidol (ISOVUE-370) solution 84 mL      84 mL Intravenous ONCE 21 1858 21                Plan:  1. Start vancomycin 1500 mg IV once, followed by intermittent dosing based on drug levels around hemodialysis.   2. Vancomycin monitoring method: Renal Replacement Therapy  3. Vancomycin therapeutic monitoring goal: 15-20 mg/L  4. Pharmacy will check vancomycin levels as appropriate in 1-3 Days.    5. Serum creatinine levels will be ordered daily for the first week of therapy and at least twice weekly for subsequent weeks.      Jona Osorio [facility]

## 2021-09-04 NOTE — PROVIDER NOTIFICATION
Provider notified (name): MD :Tri Lozada ( [id number])  Reason for notification: with elevated temp   Recommendation/request given to provider: page placed to update   Response from provider:a waiting for call back and updated on coming RN .

## 2021-09-04 NOTE — PROGRESS NOTES
[facility]    Medicine Progress Note - Hospitalist Service, Daniela 3       Date of Admission:  6/28/2021    Assessment & Plan    Dax Villareal is a 30 year old male with hx of alcohol use disorder admitted on 6/28/2021 after recent prolonged admission at [facility] in [place] who has severe acute alcoholic hepatitis c/b HE, ESRD requiring HD, and malnutrition in the setting of acute necrotizing pancreatitis, s/p PEG-J placement on 7/13. He arrived with acute respiratory failure and septic shock that have resolved, and his secondary bacterial peritonitis has been appropriately treated. He requires continued management for ESRD, hepatic encephalopathy, infected pancreatic pseudocyst, nutritional support via PEG-J, and persistent right pleural effusion. Clinically improving after multiple necrosectomies. Increasing leukocytosis and hypotension on 9/2 prompting resumption of IV antibiotics      Today:   - 500ml NS; continue to bolus as needed   - 1U PRBC for Hgn 6.8  - Continue cefepime, flagyl, and vanc  - UA with culture     #Septic Shock  #Necrotizing alcoholic pancreatitis w/ infected pseudocyst  #Secondary Bacterial Peritonitis 2/2 infected pseudocyst with VRE  Admitted to the [facility] ICU on 6/28/2021 from [facility] in [place] for septic shock due to necrotizing pancreatitis. CT on admit showing mature collection with enhancing wall measuring ~[id number]. IR placed perc drain 6/29. Repeat imaging 7/18 revealed walled off necrotic collection amenable to endoscopic transluminal drainage with cystgastrostomy stenting with necrosectomy on 7/21/21. Complete necrosectomy on 8/11. Antibiotics (cefepime and metronidazole) were discontinued on 8/25 after left flank drain removed. Patient with worsening hypotension and leukocytosis on 9/2 for which he received several fluid boluses and antibiotic therapy resumed. Favor abdominal etiology with loculated fluid collections,  though CT improving in comparison to prior images 9/2. Thoracentesis non-infectious appearing. ID consulted, recommend 7 days of antibiotics with repeat CT prior to discontinuation.   - follow BCx (9/2): NGTD  - ID consulted, recommending cefepime and flagyl for 7d (EOT 9/8) with a plan for repeat CT abdomen/pelvis prior to discontinuation. Vancomycin added 9/4 in the setting of fever  - Blood pressures consistently low - patient noted to be several kg down from previous weights concerning for hypovolemia hypotension with an underlying infectious concern at this time. Will intermittently bolus with both crystalloid and colloid fluid     #Gastric Outlet Obstruction  High G tube output due to the cystogastric connection/fluid drainage noted on 8/5. Per GI, possibly related to gastric outlet from severe abdominal inflammation and is expected. KUB negative for obstructive gas pattern. Continues to have moderate NG output - stable to improved.       #Hyponatremia, stable  Patient with down-trending sodium the week of 8/27; likely due to substantial NG output and low solute intake and hepatic dysfunction. Stable     #Right Pancreatic Hydrothorax, stable  Increased dyspnea with CXR on 7/19 with further imaging and thoracentesis confirming a hepatic/pancreatic hydrothorax. Thoracentesis PRN for dyspnea, most recently 9/3.     #Alcoholic Hepatitis  #Coagulation Defect  Received 1 week of steroids at [facility] for elevated INR and altered mental status. Not a liver transplant candidate. Mental status improved with aggressive lactulose and rifaxamin, which was discontinued without return of AMS.     #Hepatic Fluid collection vs Abscess  CT Ab/pelvis from 8/13 and 8/21 showing a 3.0 x 2.7 cm hepatic fluid collection. IR consulted and recommended continue ABx without intervention until drain removed. CT imaging on 9/2 with interval improvement.      ESRD due to ATN  Stage III RADHA due to ATN from septic shock without recovery  for >3 months. Line is TDC from PTA. Nephrology dialyzed on Tu, Th, Sa schedule.  - Will need follow-up and outpatient dialysis     #Anemia due to Chronic Illness  #Hematochezia, resolved  #Hemoperitoneum  Baseline hgb 17. Running between 6-8 in setting of chronic illness, new ESRD with epocrit per Nephrology, frequent lab draws and procedures. Hematochezia on 8/8 after starting standard intensity heparin, now resolved. Paracentesis on 8/21 with hemoperitoneum. No active bleeding. Required a unit of PRBC on 9/4 in the setting of several fluid boluses.   - Goal hgb > 7.0, trend Hgb     #Alcohol Use Disorder  Counseled patient on complete alcohol cessation. Not interested in additional services at this time. Continue daily folic acid and thiamine.     #Lactic Acidosis  Intermittent Lactic acidosis in the setting of sepsis, ESRD, bleeding.     #Hypokalemia  Noted in the setting of high gastric output and improved once G-tube output decreased. CTM.      #Severe Malnutrition  Poor oral intake in the setting of necrotizing pancreatitis. PEG tube placed 7/13. Dietitian consulted and continuous tube feeds with goal of 65 ml/hr with continuous pancreatic enzymes  - Continuous tube feeds 50 mL overnight.       Diet: Regular Diet Adult  Adult Formula Drip Feeding: Continuous Vital 1.5; Jejunostomy; Goal Rate: 55; mL/hr; Medication - Feeding Tube Flush Frequency: At least 15-30 mL water before and after medication administration and with tube clogging; Amount to Send (Nutrition us...    DVT Prophylaxis: Pneumatic Compression Devices  Rdz Catheter: Not present  Central Lines: PRESENT  PICC Triple Lumen 06/28/21 Left-Site Assessment: WDL  CVC Double Lumen Right-Site Assessment: WDL  Code Status: Full Code      Disposition Plan   Expected discharge: 09/08/2021   recommended to transitional care unit once safe disposition plan/ TCU bed available. May also transfer back to [facility].     The patient's care was discussed  with the Bedside Nurse, Care Coordinator/ and Patient.    Patient seen by and staffed with Dr. Joshua.     Anali Jackson MD   Med/Peds, PGY-2  Hospitalist Service, [address]  Securely message with the Vocera Web Console (learn more here)  Text page via [facility] Paging/Directory  Please see sign in/sign out for up to date coverage information  _____________________________________________________________________    Interval History   Patient triggered sepsis x3 overnight and received 37.5mg albumin due to soft pressures with diastolic's in the 20's. Initiated on vancomycin. He continues to be asymptomatic with the hypotensive but has improved nausea today.    The remainder of a 4 point ROS is negative unless otherwise noted above.    Data reviewed today: I reviewed all medications, new labs and imaging results over the last 24 hours.    Physical Exam   Vital Signs: Temp: (!) 96.1  F (35.6  C) Temp src: Oral BP: (!) 83/25 Pulse: 92   Resp: 20 SpO2: 99 % O2 Device: None (Room air) Oxygen Delivery: 5 LPM  Weight: 138 lbs 14.24 oz  Gen: Awake, lying in bed, uncomfortable   HEENT: NC/AT, sclera anicteric  CV: Regular rate and rhythm  Resp: Non-labored breathing on room air, diminished breath sounds in lower right base  Abd: soft, mild tenderness to palpation throughout, PEG site c/d/i  Extrem: Warm and well perfused with no edema.

## 2021-09-04 NOTE — PLAN OF CARE
This writer Assumed cares on this pt from 23:00, 09/03/2021 to 23:30 09/03/2021.    Pt was A/o x 4 able to make needs known. Low BPs ongoing issue from previous shift and outgoing RN paged provider and no new orders given. OVSS. Pt continue on TF at goal rate of 55 ML/hour via JTube and he was also drinking juices a lot then would throw up after drinking a lot of po drinks He had x 2 emesis color of po drinks that he took and also put out total of 750 ML of normal GI content out via GT. He got scheduled oxycodone and Melatonin at bedtime. He was also taking ice chips to moisten her mouth.Upper right back Thoracentesis site dressing C/D/I. He had lactic acid level of 2.2 and rapid respond called and provider on call notified. Continue to give nutritional support and continue to monitor pt hemodynamically.      Problem: Adult Inpatient Plan of Care  Goal: Plan of Care Review  Outcome: No Change     Problem: Adult Inpatient Plan of Care  Goal: Optimal Comfort and Wellbeing  Outcome: No Change     Problem: Fluid Imbalance (Pancreatitis)  Goal: Fluid Balance  Outcome: No Change     Problem: Infection (Pancreatitis)  Goal: Infection Symptom Resolution  Outcome: No Change     Problem: Bleeding (Liver Failure)  Goal: Absence of Bleeding  Outcome: No Change     Problem: Fluid Imbalance (Liver Failure)  Goal: Optimal Fluid Balance  Outcome: No Change     Problem: Neurologic Function Impaired (Liver Failure)  Goal: Optimal Neurologic Function  Outcome: No Change

## 2021-09-04 NOTE — PLAN OF CARE
"Blood pressure 100/44, pulse 97, temperature 98  F (36.7  C), temperature source Oral, resp. rate 20, height 1.676 m (5' 6\"), weight 63 kg (138 lb 14.2 oz), SpO2 95 %. Via 5L NC .     Patient A & O X 4 with flat affect , Dyspnea on exertions , lest tachyon this shift . Received scheduled medications via J- tube , flushed with water as order . No c/o nausea on this shift.Patient received  NS Bolus 500 ml/hr per Md order.  Gastrostomy open to gravity out put 1100 ml on this shift. , J- continue  TF @ 55 ml/hr . Potassium level 3.1 and replaced per MD order. Hgb 6.8 and has order for PRBC one unit and  PRBC transfusion started @ 14:15 tolerated well needs Hgb recheck when PRBC completed . Updated on coming RN Génesis . Patient had bowel movement x 1. Call light within reach , bed alarm on and hourly round completed . Continue monitor closely and update MD with change.   "

## 2021-09-04 NOTE — PROVIDER NOTIFICATION
Provider notified (name): MD :Anali Jackson, ([id number])  Reason for notification: low Hgb 6.8  Recommendation/request given to provider: Page placed with result .  Response from provider: awaiting for call back.

## 2021-09-05 LAB
ANION GAP SERPL CALCULATED.3IONS-SCNC: 14 MMOL/L (ref 3–14)
BUN SERPL-MCNC: 47 MG/DL (ref 7–30)
CALCIUM SERPL-MCNC: 9.2 MG/DL (ref 8.5–10.1)
CHLORIDE BLD-SCNC: 95 MMOL/L (ref 94–109)
CO2 SERPL-SCNC: 18 MMOL/L (ref 20–32)
CREAT SERPL-MCNC: 3.56 MG/DL (ref 0.66–1.25)
ERYTHROCYTE [DISTWIDTH] IN BLOOD BY AUTOMATED COUNT: 16.4 % (ref 10–15)
GFR SERPL CREATININE-BSD FRML MDRD: 22 ML/MIN/1.73M2
GLUCOSE BLD-MCNC: 123 MG/DL (ref 70–99)
HCT VFR BLD AUTO: 26 % (ref 40–53)
HGB BLD-MCNC: 8.6 G/DL (ref 13.3–17.7)
MAGNESIUM SERPL-MCNC: 2.1 MG/DL (ref 1.6–2.3)
MCH RBC QN AUTO: 30.5 PG (ref 26.5–33)
MCHC RBC AUTO-ENTMCNC: 33.1 G/DL (ref 31.5–36.5)
MCV RBC AUTO: 92 FL (ref 78–100)
PHOSPHATE SERPL-MCNC: 6.4 MG/DL (ref 2.5–4.5)
PLATELET # BLD AUTO: 262 10E3/UL (ref 150–450)
POTASSIUM BLD-SCNC: 3.8 MMOL/L (ref 3.4–5.3)
RBC # BLD AUTO: 2.82 10E6/UL (ref 4.4–5.9)
SARS-COV-2 RNA RESP QL NAA+PROBE: NEGATIVE
SODIUM SERPL-SCNC: 127 MMOL/L (ref 133–144)
WBC # BLD AUTO: 11.6 10E3/UL (ref 4–11)

## 2021-09-05 PROCEDURE — 36592 COLLECT BLOOD FROM PICC: CPT | Performed by: STUDENT IN AN ORGANIZED HEALTH CARE EDUCATION/TRAINING PROGRAM

## 2021-09-05 PROCEDURE — 250N000013 HC RX MED GY IP 250 OP 250 PS 637: Performed by: INTERNAL MEDICINE

## 2021-09-05 PROCEDURE — 120N000002 HC R&B MED SURG/OB UMMC

## 2021-09-05 PROCEDURE — 250N000013 HC RX MED GY IP 250 OP 250 PS 637: Performed by: STUDENT IN AN ORGANIZED HEALTH CARE EDUCATION/TRAINING PROGRAM

## 2021-09-05 PROCEDURE — 99233 SBSQ HOSP IP/OBS HIGH 50: CPT | Mod: 24 | Performed by: INTERNAL MEDICINE

## 2021-09-05 PROCEDURE — U0003 INFECTIOUS AGENT DETECTION BY NUCLEIC ACID (DNA OR RNA); SEVERE ACUTE RESPIRATORY SYNDROME CORONAVIRUS 2 (SARS-COV-2) (CORONAVIRUS DISEASE [COVID-19]), AMPLIFIED PROBE TECHNIQUE, MAKING USE OF HIGH THROUGHPUT TECHNOLOGIES AS DESCRIBED BY CMS-2020-01-R: HCPCS | Performed by: STUDENT IN AN ORGANIZED HEALTH CARE EDUCATION/TRAINING PROGRAM

## 2021-09-05 PROCEDURE — 250N000011 HC RX IP 250 OP 636: Performed by: INTERNAL MEDICINE

## 2021-09-05 PROCEDURE — 99207 PR CDG-MDM COMPONENT: MEETS HIGH - UP CODED: CPT | Performed by: PEDIATRICS

## 2021-09-05 PROCEDURE — 99233 SBSQ HOSP IP/OBS HIGH 50: CPT | Mod: GC | Performed by: PEDIATRICS

## 2021-09-05 PROCEDURE — 250N000011 HC RX IP 250 OP 636: Performed by: STUDENT IN AN ORGANIZED HEALTH CARE EDUCATION/TRAINING PROGRAM

## 2021-09-05 PROCEDURE — 80048 BASIC METABOLIC PNL TOTAL CA: CPT | Performed by: STUDENT IN AN ORGANIZED HEALTH CARE EDUCATION/TRAINING PROGRAM

## 2021-09-05 PROCEDURE — 250N000013 HC RX MED GY IP 250 OP 250 PS 637: Performed by: PEDIATRICS

## 2021-09-05 PROCEDURE — C9113 INJ PANTOPRAZOLE SODIUM, VIA: HCPCS | Performed by: INTERNAL MEDICINE

## 2021-09-05 PROCEDURE — 85027 COMPLETE CBC AUTOMATED: CPT | Performed by: STUDENT IN AN ORGANIZED HEALTH CARE EDUCATION/TRAINING PROGRAM

## 2021-09-05 PROCEDURE — 83735 ASSAY OF MAGNESIUM: CPT | Performed by: INTERNAL MEDICINE

## 2021-09-05 PROCEDURE — 84100 ASSAY OF PHOSPHORUS: CPT | Performed by: INTERNAL MEDICINE

## 2021-09-05 RX ORDER — SEVELAMER CARBONATE FOR ORAL SUSPENSION 800 MG/1
0.8 POWDER, FOR SUSPENSION ORAL 3 TIMES DAILY
Status: DISCONTINUED | OUTPATIENT
Start: 2021-09-05 | End: 2021-09-20

## 2021-09-05 RX ADMIN — THIAMINE HCL TAB 100 MG 100 MG: 100 TAB at 08:57

## 2021-09-05 RX ADMIN — PANCRELIPASE 2 CAPSULE: 24000; 76000; 120000 CAPSULE, DELAYED RELEASE PELLETS ORAL at 20:12

## 2021-09-05 RX ADMIN — SEVELAMER CARBONATE 0.8 G: 800 POWDER, FOR SUSPENSION ORAL at 20:03

## 2021-09-05 RX ADMIN — HYDROMORPHONE HYDROCHLORIDE 1 MG: 1 SOLUTION ORAL at 21:36

## 2021-09-05 RX ADMIN — SODIUM BICARBONATE 325 MG: 325 TABLET ORAL at 05:25

## 2021-09-05 RX ADMIN — CEFEPIME HYDROCHLORIDE 1 G: 1 INJECTION, POWDER, FOR SOLUTION INTRAMUSCULAR; INTRAVENOUS at 20:03

## 2021-09-05 RX ADMIN — PANCRELIPASE 2 CAPSULE: 24000; 76000; 120000 CAPSULE, DELAYED RELEASE PELLETS ORAL at 05:25

## 2021-09-05 RX ADMIN — METRONIDAZOLE 500 MG: 500 INJECTION, SOLUTION INTRAVENOUS at 20:12

## 2021-09-05 RX ADMIN — Medication 1 PACKET: at 20:14

## 2021-09-05 RX ADMIN — OXYCODONE HYDROCHLORIDE 5 MG: 5 SOLUTION ORAL at 23:08

## 2021-09-05 RX ADMIN — ONDANSETRON 4 MG: 2 INJECTION INTRAMUSCULAR; INTRAVENOUS at 14:19

## 2021-09-05 RX ADMIN — PANCRELIPASE 2 CAPSULE: 24000; 76000; 120000 CAPSULE, DELAYED RELEASE PELLETS ORAL at 00:56

## 2021-09-05 RX ADMIN — FOLIC ACID 1 MG: 1 TABLET ORAL at 08:57

## 2021-09-05 RX ADMIN — Medication 1 PACKET: at 12:44

## 2021-09-05 RX ADMIN — HYDROMORPHONE HYDROCHLORIDE 1 MG: 1 SOLUTION ORAL at 09:15

## 2021-09-05 RX ADMIN — HYDROMORPHONE HYDROCHLORIDE 1 MG: 1 SOLUTION ORAL at 17:27

## 2021-09-05 RX ADMIN — OXYCODONE HYDROCHLORIDE 5 MG: 5 SOLUTION ORAL at 06:28

## 2021-09-05 RX ADMIN — SODIUM BICARBONATE 325 MG: 325 TABLET ORAL at 00:56

## 2021-09-05 RX ADMIN — MELATONIN TAB 3 MG 6 MG: 3 TAB at 23:08

## 2021-09-05 RX ADMIN — Medication 1 PACKET: at 09:00

## 2021-09-05 RX ADMIN — METRONIDAZOLE 500 MG: 500 INJECTION, SOLUTION INTRAVENOUS at 08:57

## 2021-09-05 RX ADMIN — Medication 5 ML: at 08:57

## 2021-09-05 RX ADMIN — PANCRELIPASE 2 CAPSULE: 24000; 76000; 120000 CAPSULE, DELAYED RELEASE PELLETS ORAL at 13:10

## 2021-09-05 RX ADMIN — OXYCODONE HYDROCHLORIDE 5 MG: 5 SOLUTION ORAL at 14:02

## 2021-09-05 RX ADMIN — SODIUM BICARBONATE 325 MG: 325 TABLET ORAL at 20:12

## 2021-09-05 RX ADMIN — SODIUM BICARBONATE 325 MG: 325 TABLET ORAL at 13:09

## 2021-09-05 RX ADMIN — HYDROMORPHONE HYDROCHLORIDE 1 MG: 1 SOLUTION ORAL at 01:42

## 2021-09-05 RX ADMIN — Medication 1 PACKET: at 13:10

## 2021-09-05 RX ADMIN — SEVELAMER CARBONATE 0.8 G: 800 POWDER, FOR SUSPENSION ORAL at 13:23

## 2021-09-05 RX ADMIN — Medication 10 ML: at 17:27

## 2021-09-05 ASSESSMENT — ACTIVITIES OF DAILY LIVING (ADL)
ADLS_ACUITY_SCORE: 17
ADLS_ACUITY_SCORE: 18

## 2021-09-05 ASSESSMENT — PAIN DESCRIPTION - DESCRIPTORS: DESCRIPTORS: ACHING

## 2021-09-05 NOTE — PROGRESS NOTES
Nephrology Progress Note  09/05/2021         Dax Villareal is a 30 year old male with h/o ETOH hepatitis, end stage liver disease, RADHA on HD, transferred from [facility] in [place] for septic shock on 6/28/21 for treatment of necrotizing pancreatitis and decompensated liver disease. He was initially admitted to [facility] 5/19/21 and started on RRT 5/26/2021.        Interval History : September 5, 2021    Mr Villareal had necrosectomy for 4th time on 8/11 with no more anticipated.  Had HD (9/3) with NO UF.     Remains hypotensive.  Patient MUCH more awake, alert, communicative this am.  Denies SOB, CP, Abd pain.  Lactic acidosis resolved  Is on cefepime + metronidazole per ID.      Assessment & Recommendations:   ESRD-Cr was 0.8 as recently as 5/19/2021 but now HD dependent for 3 months, started RRT at [facility] prior to transfer to [facility] on 5/26.  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  continuing restorative cares for now, BP's have been stable enough to support HD.  Had GI stenting of pancreas x4.                 -Line is TDC from PTA               - No indication for HD today.   Will reassess needs daily, but plan HD in am given anticipated acidosis.     - Plan HD in am.      Electrolytes/pH-  Suggest baking soda 1/2 tsp q 12 on NON dialysis days as this is easily dissolved and can be put in with his tube feeds (1 tsp provides ~8x the amount of 1 bicarbonate tablet)     Ca/phos/pth-     Add sevelemer powder at start of tube feeds and end tube feeds if overnight or TID if continuous    Anemia-Hgb stable, acute management per team.  on EPO 6k with HD, iron sats 43%        Recommendations were communicated to primary team via note  Ladarius Reid MD  Division of Nephrology and Hypertension  Pager: [phone]  September 5, 2021         Review of Systems:   I reviewed the following systems:  Gen: No fevers or chills  CV: No CP at  "rest  Resp: No SOB at rest  GI: ongoing nausea and vomiting    Physical Exam:   I/O last 3 completed shifts:  In: 1935 [P.O.:250; I.V.:20; NG/GT:490]  Out: 3150 [Emesis/NG output:3150]   /40 (BP Location: Right arm)   Pulse 108   Temp 97.9  F (36.6  C) (Oral)   Resp 18   Ht 1.676 m (5' 6\")   Wt 63 kg (138 lb 14.2 oz)   SpO2 93%   BMI 22.42 kg/m       GENERAL APPEARANCE: Lying in bed, awake, alert, in NAD.  conversant  EYES:  scleral icterus, pupils equal  Pulmonary: decreased BS   CV: tachy   - Edema - no LE edema  GI: mild distention, non-tender.   MS: no evidence of inflammation in joints, no muscle tenderness  SKIN: no rash, warm, dry, no cyanosis  NEURO: alert/interactive  ACCESS: Right TDC with no induration or erythema    Labs:   All labs reviewed by me  Electrolytes/Renal -   Recent Labs   Lab Test 09/05/21 0637 09/04/21 0414 09/03/21 0523   * 128* 123*   POTASSIUM 3.8 3.1* 3.4   CHLORIDE 95 94 87*   CO2 18* 22 19*   BUN 47* 32* 74*   CR 3.56* 2.78* 4.90*   * 121* 146*   JANENE 9.2 8.3* 9.6   MAG 2.1 2.0 2.7*   PHOS 6.4* 4.4 10.9*       CBC -   Recent Labs   Lab Test 09/05/21  0637 09/04/21 2031 09/04/21 0409 09/03/21 0523   WBC 11.6*  --  9.8 15.7*   HGB 8.6* 8.0* 6.8* 7.8*     --  257 316       LFTs -   Recent Labs   Lab Test 09/04/21 0414 09/03/21  0523 09/02/21  1730   ALKPHOS 81 95 120   BILITOTAL 1.4* 2.0* 2.2*   ALT 29 31 40   AST 38 39 57*   PROTTOTAL 8.8 9.2* 10.7*   ALBUMIN 2.9* 2.3* 2.7*       Iron Panel -   Recent Labs   Lab Test 08/14/21 2055 07/19/21  0632   IRON 28* 31*   IRONSAT 21 43   JERRELL 2,186*  --            Current Medications:    sodium bicarbonate  325 mg Per Feeding Tube Q4H    And     amylase-lipase-protease  1-2 capsule Per Feeding Tube Q4H     amylase-lipase-protease  2 capsule Oral TID w/meals     B and C vitamin Complex with folic acid  5 mL Per Feeding Tube Daily     banatrol plus  1 packet Per Feeding Tube TID     ceFEPIme (MAXIPIME) IV  1 g " Intravenous Q24H     folic acid  1 mg Oral Daily     heparin lock flush  5-20 mL Intracatheter Q24H     melatonin  6 mg Oral At Bedtime     menthol   Transdermal Q8H     metroNIDAZOLE  500 mg Intravenous Q12H     oxyCODONE  5 mg Oral Q8H     pantoprazole (PROTONIX) IV  40 mg Intravenous Daily with breakfast     protein modular  1 packet Per Feeding Tube Daily     sevelamer carbonate (RENVELA)  0.8 g Oral TID w/meals     sodium chloride (PF)  3 mL Intracatheter Q8H     sodium chloride (PF)  3 mL Intracatheter Q8H     sodium chloride (PF)  3 mL Intracatheter Q8H     vitamin B1  100 mg Oral Daily    Or     thiamine  100 mg Intravenous Daily     vancomycin place smith - receiving intermittent dosing  1 each Intravenous See Admin Instructions       - MEDICATION INSTRUCTIONS -       dextrose       heparin (porcine) 1,000 Units/hr (08/24/21 0907)

## 2021-09-05 NOTE — PROGRESS NOTES
[facility]    Medicine Progress Note - Hospitalist Service, Daniela 3       Date of Admission:  6/28/2021    Assessment & Plan    Dax Villareal is a 30 year old male with hx of alcohol use disorder admitted on 6/28/2021 after recent prolonged admission at [facility] in [place] who has severe acute alcoholic hepatitis c/b HE, ESRD requiring HD, and malnutrition in the setting of acute necrotizing pancreatitis, s/p PEG-J placement on 7/13. He arrived with acute respiratory failure and septic shock that have resolved, and his secondary bacterial peritonitis has been appropriately treated. He requires continued management for ESRD, hepatic encephalopathy, infected pancreatic pseudocyst, nutritional support via PEG-J, and persistent right pleural effusion. Clinically improving after multiple necrosectomies. Increasing leukocytosis and hypotension on 9/2 prompting resumption of IV antibiotics - has been stable since that time.     Today:   - Continue cefepime, flagyl, and vanc  - Repeat CT A/P 9/7  - Sevelemer powder TID with TF for hyperphosphatemia      #Septic Shock  #Necrotizing alcoholic pancreatitis w/ infected pseudocyst  #Secondary Bacterial Peritonitis 2/2 infected pseudocyst with VRE  Admitted to the [facility] ICU on 6/28/2021 from [facility] in [place] for septic shock due to necrotizing pancreatitis. CT on admit showing mature collection with enhancing wall measuring ~[id number]. IR placed perc drain 6/29. Repeat imaging 7/18 revealed walled off necrotic collection amenable to endoscopic transluminal drainage with cystgastrostomy stenting with necrosectomy on 7/21/21. Complete necrosectomy on 8/11. Antibiotics (cefepime and metronidazole) were discontinued on 8/25 after left flank drain removed. Patient with worsening hypotension and leukocytosis on 9/2 for which he received several fluid boluses and antibiotic therapy resumed. Favor abdominal etiology with  loculated fluid collections, though CT improving in comparison to prior images 9/2. Thoracentesis non-infectious appearing. ID consulted, recommend 7 days of antibiotics with repeat CT prior to discontinuation.   - follow BCx (9/2): NGTD  - ID consulted, recommending cefepime and flagyl for 7d (EOT 9/8) with a plan for repeat CT abdomen/pelvis prior to discontinuation. Vancomycin added 9/4 in the setting of fever  - Blood pressures consistently low - patient noted to be several kg down from previous weights concerning for hypovolemia hypotension with an underlying infectious concern at this time. Will intermittently bolus with both crystalloid and colloid fluid - BP's improving with fluids.      #Gastric Outlet Obstruction  High G tube output due to the cystogastric connection/fluid drainage noted on 8/5. Per GI, possibly related to gastric outlet from severe abdominal inflammation and is expected. KUB negative for obstructive gas pattern. Continues to have moderate NG output - stable to improved.       #Hyponatremia, stable  Patient with down-trending sodium the week of 8/27; likely due to substantial NG output and low solute intake and hepatic dysfunction. Stable     #Right Pancreatic Hydrothorax, stable  Increased dyspnea with CXR on 7/19 with further imaging and thoracentesis confirming a hepatic/pancreatic hydrothorax. Thoracentesis PRN for dyspnea, most recently 9/3.     #Alcoholic Hepatitis  #Coagulation Defect  Received 1 week of steroids at [facility] for elevated INR and altered mental status. Not a liver transplant candidate. Mental status improved with aggressive lactulose and rifaxamin, which was discontinued without return of AMS.     #Hepatic Fluid collection vs Abscess  CT Ab/pelvis from 8/13 and 8/21 showing a 3.0 x 2.7 cm hepatic fluid collection. IR consulted and recommended continue ABx without intervention until drain removed. CT imaging on 9/2 with interval improvement.      ESRD due to  ATN  Hyperphosphatemia  Stage III RADHA due to ATN from septic shock without recovery for >3 months. Line is TDC from PTA. Nephrology dialyzed on Tu, Th, Sa schedule.  - Will need follow-up and outpatient dialysis  - Sevelemer powder TID with TF for hyperphosphatemia      #Anemia due to Chronic Illness  #Hematochezia, resolved  #Hemoperitoneum  Baseline hgb 17. Running between 6-8 in setting of chronic illness, new ESRD with epocrit per Nephrology, frequent lab draws and procedures. Hematochezia on 8/8 after starting standard intensity heparin, now resolved. Paracentesis on 8/21 with hemoperitoneum. No active bleeding. Required a unit of PRBC on 9/4 in the setting of several fluid boluses.   - Goal hgb > 7.0, trend Hgb     #Alcohol Use Disorder  Counseled patient on complete alcohol cessation. Not interested in additional services at this time. Continue daily folic acid and thiamine.     #Lactic Acidosis  Intermittent Lactic acidosis in the setting of sepsis, ESRD, bleeding.     #Hypokalemia  Noted in the setting of high gastric output and improved once G-tube output decreased. CTM.      #Severe Malnutrition  Poor oral intake in the setting of necrotizing pancreatitis. PEG tube placed 7/13. Dietitian consulted and continuous tube feeds with goal of 65 ml/hr with continuous pancreatic enzymes  - Continuous tube feeds 50 mL overnight.       Diet: Regular Diet Adult  Adult Formula Drip Feeding: Continuous Vital 1.5; Jejunostomy; Goal Rate: 55; mL/hr; Medication - Feeding Tube Flush Frequency: At least 15-30 mL water before and after medication administration and with tube clogging; Amount to Send (Nutrition us...    DVT Prophylaxis: Pneumatic Compression Devices  Rdz Catheter: Not present  Central Lines: PRESENT       Code Status: Full Code      Disposition Plan   Expected discharge: 09/08/2021   recommended to transitional care unit once safe disposition plan/ TCU bed available. May also transfer back to [facility]  [facility] [place] [facility]\"".     The patient's care was discussed with the Bedside Nurse, Care Coordinator/ and Patient.    Patient seen by and staffed with Dr. Joshua.     Anali Jackson MD   Med/Peds, PGY-2  Hospitalist Service, [address]  Securely message with the Vocera Web Console (learn more here)  Text page via [employer] Paging/Directory  Please see sign in/sign out for up to date coverage information  _____________________________________________________________________    Interval History   Patient with more stable BP's overnight, feeling well this morning. No complaints, feels his nausea is improved though had significant NG output yet. Notes his dyspnea is improved.     The remainder of a 4 point ROS is negative unless otherwise noted above.    Data reviewed today: I reviewed all medications, new labs and imaging results over the last 24 hours.    Physical Exam   Vital Signs: Temp: 97.9  F (36.6  C) Temp src: Oral BP: 102/40 Pulse: 108   Resp: 18 SpO2: 93 % O2 Device: Nasal cannula Oxygen Delivery: 5 LPM  Weight: 138 lbs 14.24 oz  Gen: Awake, lying in bed, comfortable and in a good mood this morning   HEENT: NC/AT, sclera anicteric  CV: Regular rhythm, tachycardic   Resp: Non-labored breathing on room air, diminished breath sounds in lower right base  Abd: soft, mild tenderness to palpation throughout, PEG site c/d/i  Extrem: Warm and well perfused with no edema.

## 2021-09-05 NOTE — PLAN OF CARE
"Cares 1500 to 1900    BP (!) 90/30   Pulse 102   Temp 98.9  F (37.2  C) (Oral)   Resp 20   Ht 1.676 m (5' 6\")   Wt 63 kg (138 lb 14.2 oz)   SpO2 95%   BMI 22.42 kg/m      Hypotensive. Tachycardic. No increased work of breathing observed. On 5L NC. Scheduled oxy given for back pain via J tube. G tube to gravity. Encouraged pt to keep NC on. Lethargic most of shift. Uses call light appropriately.   "

## 2021-09-06 LAB
CRP SERPL-MCNC: 67 MG/L (ref 0–8)
HOLD SPECIMEN: NORMAL
HOLD SPECIMEN: NORMAL
MAGNESIUM SERPL-MCNC: 2.2 MG/DL (ref 1.6–2.3)
VANCOMYCIN SERPL-MCNC: 25.2 MG/L

## 2021-09-06 PROCEDURE — 250N000013 HC RX MED GY IP 250 OP 250 PS 637: Performed by: PEDIATRICS

## 2021-09-06 PROCEDURE — 86140 C-REACTIVE PROTEIN: CPT | Performed by: INTERNAL MEDICINE

## 2021-09-06 PROCEDURE — 250N000011 HC RX IP 250 OP 636: Performed by: INTERNAL MEDICINE

## 2021-09-06 PROCEDURE — 250N000013 HC RX MED GY IP 250 OP 250 PS 637: Performed by: STUDENT IN AN ORGANIZED HEALTH CARE EDUCATION/TRAINING PROGRAM

## 2021-09-06 PROCEDURE — 250N000013 HC RX MED GY IP 250 OP 250 PS 637: Performed by: INTERNAL MEDICINE

## 2021-09-06 PROCEDURE — C9113 INJ PANTOPRAZOLE SODIUM, VIA: HCPCS | Performed by: INTERNAL MEDICINE

## 2021-09-06 PROCEDURE — 99233 SBSQ HOSP IP/OBS HIGH 50: CPT

## 2021-09-06 PROCEDURE — 250N000013 HC RX MED GY IP 250 OP 250 PS 637

## 2021-09-06 PROCEDURE — 83735 ASSAY OF MAGNESIUM: CPT | Performed by: INTERNAL MEDICINE

## 2021-09-06 PROCEDURE — 36415 COLL VENOUS BLD VENIPUNCTURE: CPT | Performed by: PEDIATRICS

## 2021-09-06 PROCEDURE — 250N000011 HC RX IP 250 OP 636: Performed by: STUDENT IN AN ORGANIZED HEALTH CARE EDUCATION/TRAINING PROGRAM

## 2021-09-06 PROCEDURE — 36592 COLLECT BLOOD FROM PICC: CPT | Performed by: INTERNAL MEDICINE

## 2021-09-06 PROCEDURE — 80202 ASSAY OF VANCOMYCIN: CPT | Performed by: PEDIATRICS

## 2021-09-06 PROCEDURE — 90937 HEMODIALYSIS REPEATED EVAL: CPT

## 2021-09-06 PROCEDURE — 250N000011 HC RX IP 250 OP 636: Performed by: PEDIATRICS

## 2021-09-06 PROCEDURE — 99233 SBSQ HOSP IP/OBS HIGH 50: CPT | Mod: GC | Performed by: PEDIATRICS

## 2021-09-06 PROCEDURE — 99207 PR CDG-CUT & PASTE-POTENTIAL IMPACT ON LEVEL: CPT | Performed by: PEDIATRICS

## 2021-09-06 PROCEDURE — 120N000002 HC R&B MED SURG/OB UMMC

## 2021-09-06 PROCEDURE — 634N000001 HC RX 634: Performed by: INTERNAL MEDICINE

## 2021-09-06 RX ORDER — VANCOMYCIN HYDROCHLORIDE 500 MG/10ML
500 INJECTION, POWDER, LYOPHILIZED, FOR SOLUTION INTRAVENOUS ONCE
Status: COMPLETED | OUTPATIENT
Start: 2021-09-06 | End: 2021-09-06

## 2021-09-06 RX ADMIN — SODIUM BICARBONATE 325 MG: 325 TABLET ORAL at 00:30

## 2021-09-06 RX ADMIN — PANCRELIPASE 2 CAPSULE: 24000; 76000; 120000 CAPSULE, DELAYED RELEASE PELLETS ORAL at 04:51

## 2021-09-06 RX ADMIN — SODIUM BICARBONATE 325 MG: 325 TABLET ORAL at 08:57

## 2021-09-06 RX ADMIN — PANCRELIPASE 2 CAPSULE: 24000; 76000; 120000 CAPSULE, DELAYED RELEASE PELLETS ORAL at 00:30

## 2021-09-06 RX ADMIN — HYDROXYZINE HYDROCHLORIDE 50 MG: 25 TABLET, FILM COATED ORAL at 19:47

## 2021-09-06 RX ADMIN — PANCRELIPASE 2 CAPSULE: 24000; 76000; 120000 CAPSULE, DELAYED RELEASE PELLETS ORAL at 15:00

## 2021-09-06 RX ADMIN — VANCOMYCIN HYDROCHLORIDE 500 MG: 500 INJECTION, POWDER, LYOPHILIZED, FOR SOLUTION INTRAVENOUS at 15:43

## 2021-09-06 RX ADMIN — SEVELAMER CARBONATE 0.8 G: 800 POWDER, FOR SUSPENSION ORAL at 15:27

## 2021-09-06 RX ADMIN — Medication 5 ML: at 09:02

## 2021-09-06 RX ADMIN — HYDROMORPHONE HYDROCHLORIDE 1 MG: 1 SOLUTION ORAL at 13:45

## 2021-09-06 RX ADMIN — CEFEPIME HYDROCHLORIDE 1 G: 1 INJECTION, POWDER, FOR SOLUTION INTRAMUSCULAR; INTRAVENOUS at 19:48

## 2021-09-06 RX ADMIN — Medication 1 PACKET: at 19:48

## 2021-09-06 RX ADMIN — Medication 15 ML: at 16:06

## 2021-09-06 RX ADMIN — EPOETIN ALFA-EPBX 6000 UNITS: 10000 INJECTION, SOLUTION INTRAVENOUS; SUBCUTANEOUS at 13:47

## 2021-09-06 RX ADMIN — THIAMINE HCL TAB 100 MG 100 MG: 100 TAB at 09:01

## 2021-09-06 RX ADMIN — Medication 1 PACKET: at 09:03

## 2021-09-06 RX ADMIN — SEVELAMER CARBONATE 0.8 G: 800 POWDER, FOR SUSPENSION ORAL at 09:02

## 2021-09-06 RX ADMIN — ONDANSETRON 4 MG: 2 INJECTION INTRAMUSCULAR; INTRAVENOUS at 15:41

## 2021-09-06 RX ADMIN — PANCRELIPASE 2 CAPSULE: 24000; 76000; 120000 CAPSULE, DELAYED RELEASE PELLETS ORAL at 08:57

## 2021-09-06 RX ADMIN — METRONIDAZOLE 500 MG: 500 INJECTION, SOLUTION INTRAVENOUS at 21:54

## 2021-09-06 RX ADMIN — HYDROMORPHONE HYDROCHLORIDE 1 MG: 1 SOLUTION ORAL at 18:57

## 2021-09-06 RX ADMIN — SODIUM BICARBONATE 325 MG: 325 TABLET ORAL at 19:30

## 2021-09-06 RX ADMIN — SODIUM BICARBONATE 325 MG: 325 TABLET ORAL at 04:51

## 2021-09-06 RX ADMIN — OXYCODONE HYDROCHLORIDE 5 MG: 5 SOLUTION ORAL at 15:42

## 2021-09-06 RX ADMIN — Medication 1 PACKET: at 15:27

## 2021-09-06 RX ADMIN — PANCRELIPASE 2 CAPSULE: 24000; 76000; 120000 CAPSULE, DELAYED RELEASE PELLETS ORAL at 19:30

## 2021-09-06 RX ADMIN — CALCIUM CARBONATE (ANTACID) CHEW TAB 500 MG 500 MG: 500 CHEW TAB at 19:47

## 2021-09-06 RX ADMIN — PANTOPRAZOLE SODIUM 40 MG: 40 INJECTION, POWDER, FOR SOLUTION INTRAVENOUS at 09:09

## 2021-09-06 RX ADMIN — OXYCODONE HYDROCHLORIDE 5 MG: 5 SOLUTION ORAL at 09:01

## 2021-09-06 RX ADMIN — METRONIDAZOLE 500 MG: 500 INJECTION, SOLUTION INTRAVENOUS at 09:01

## 2021-09-06 RX ADMIN — ONDANSETRON 4 MG: 2 INJECTION INTRAMUSCULAR; INTRAVENOUS at 09:09

## 2021-09-06 RX ADMIN — SODIUM BICARBONATE 325 MG: 325 TABLET ORAL at 15:00

## 2021-09-06 RX ADMIN — SEVELAMER CARBONATE 0.8 G: 800 POWDER, FOR SUSPENSION ORAL at 19:47

## 2021-09-06 RX ADMIN — GUAIFENESIN 10 ML: 200 SOLUTION ORAL at 16:13

## 2021-09-06 RX ADMIN — HYDROMORPHONE HYDROCHLORIDE 1 MG: 1 SOLUTION ORAL at 01:39

## 2021-09-06 RX ADMIN — FOLIC ACID 1 MG: 1 TABLET ORAL at 09:01

## 2021-09-06 ASSESSMENT — ACTIVITIES OF DAILY LIVING (ADL)
ADLS_ACUITY_SCORE: 17

## 2021-09-06 ASSESSMENT — MIFFLIN-ST. JEOR: SCORE: 1486.75

## 2021-09-06 NOTE — PROVIDER NOTIFICATION
D/I: Pt is alert and oriented x4. Tachycardic and dyspneic on 4L oxygen via NC over night. Right Chest dialysis access intact. GJ tube patent with J--port running TF @ 55 mL/hr with solution + enzymes/NaBicarb Q4h. G-port to gravity with large output. Scheduled oxycodone given for pain and Dilaudid oral PRN. Skin is jaundiced and fragile. Pt repositions independently in bed.   P: Will continue to monitor pt and follow POC.

## 2021-09-06 NOTE — PLAN OF CARE
"Cares 0700 to 1900    BP (!) 94/33 (BP Location: Right arm)   Pulse 106   Temp 97.9  F (36.6  C) (Oral)   Resp 20   Ht 1.676 m (5' 6\")   Wt 63 kg (138 lb 14.2 oz)   SpO2 96%   BMI 22.42 kg/m      VSS ex soft BP's. A&o x4. Calls appropriately. Dry heaving 1x today, zofran IV given with relief. Pt slept in between cares. Pt takes off NC intermittently, did not destat on RA, O2 remained in high 90s. Pt ordered meals today, but did not eat. Wanted food stored in fridge. 2 large loose BM's today.     Dialysis tomorrow at 8 am.   "

## 2021-09-06 NOTE — PROGRESS NOTES
[facility]    Medicine Progress Note - Hospitalist Service, Daniela 3       Date of Admission:  6/28/2021    Assessment & Plan    Dax Villareal is a 30 year old male with hx of alcohol use disorder admitted on 6/28/2021 after recent prolonged admission at [facility] in [place] who has severe acute alcoholic hepatitis c/b HE, ESRD requiring HD, and malnutrition in the setting of acute necrotizing pancreatitis, s/p PEG-J placement on 7/13. He arrived with acute respiratory failure and septic shock that have resolved, and his secondary bacterial peritonitis has been appropriately treated. He requires continued management for ESRD, hepatic encephalopathy, infected pancreatic pseudocyst, nutritional support via PEG-J, and persistent right pleural effusion. Clinically improving after multiple necrosectomies. Increasing leukocytosis and hypotension on 9/2 prompting resumption of IV antibiotics - has been stable since that time.     Today:   - Continue cefepime, flagyl, and vanc  - Repeat CT A/P 9/7  - HD today     #Septic Shock  #Necrotizing alcoholic pancreatitis w/ infected pseudocyst  #Secondary Bacterial Peritonitis 2/2 infected pseudocyst with VRE  Admitted to the [facility] ICU on 6/28/2021 from [facility] in [place] for septic shock due to necrotizing pancreatitis. CT on admit showing mature collection with enhancing wall measuring ~[id number]. IR placed perc drain 6/29. Repeat imaging 7/18 revealed walled off necrotic collection amenable to endoscopic transluminal drainage with cystgastrostomy stenting with necrosectomy on 7/21/21. Complete necrosectomy on 8/11. Antibiotics (cefepime and metronidazole) were discontinued on 8/25 after left flank drain removed. Patient with worsening hypotension and leukocytosis on 9/2 for which he received several fluid boluses and antibiotic therapy resumed. Favor abdominal etiology with loculated fluid collections, though CT improving  in comparison to prior images 9/2. Thoracentesis non-infectious appearing. ID consulted, recommend 7 days of antibiotics with repeat CT prior to discontinuation.   - follow BCx (9/2): NGTD  - ID consulted, recommending cefepime and flagyl for 7d (EOT 9/8) with a plan for repeat CT abdomen/pelvis prior to discontinuation. Vancomycin added 9/4 in the setting of fever  - Blood pressures consistently low - patient noted to be several kg down from previous weights concerning for hypovolemia hypotension with an underlying infectious concern at this time. Will intermittently bolus with both crystalloid and colloid fluid - BP's improving with fluids.      #Gastric Outlet Obstruction  High G tube output due to the cystogastric connection/fluid drainage noted on 8/5. Per GI, possibly related to gastric outlet from severe abdominal inflammation and is expected. KUB negative for obstructive gas pattern. Continues to have moderate NG output - stable to improved.       #Hyponatremia, stable  Patient with down-trending sodium the week of 8/27; likely due to substantial NG output and low solute intake and hepatic dysfunction. Stable     #Right Pancreatic Hydrothorax, stable  Increased dyspnea with CXR on 7/19 with further imaging and thoracentesis confirming a hepatic/pancreatic hydrothorax. Thoracentesis PRN for dyspnea, most recently 9/3.     #Alcoholic Hepatitis  #Coagulation Defect  Received 1 week of steroids at [facility] for elevated INR and altered mental status. Not a liver transplant candidate. Mental status improved with aggressive lactulose and rifaxamin, which was discontinued without return of AMS.     #Hepatic Fluid collection vs Abscess  CT Ab/pelvis from 8/13 and 8/21 showing a 3.0 x 2.7 cm hepatic fluid collection. IR consulted and recommended continue ABx without intervention until drain removed. CT imaging on 9/2 with interval improvement.      ESRD due to ATN  Hyperphosphatemia  Stage III RADHA due to ATN from  septic shock without recovery for >3 months. Line is TDC from PTA. Nephrology dialyzed on Tu, Th, Sa schedule.  - Will need follow-up and outpatient dialysis  - Sevelemer powder TID with TF for hyperphosphatemia      #Anemia due to Chronic Illness  #Hematochezia, resolved  #Hemoperitoneum  Baseline hgb 17. Running between 6-8 in setting of chronic illness, new ESRD with epocrit per Nephrology, frequent lab draws and procedures. Hematochezia on 8/8 after starting standard intensity heparin, now resolved. Paracentesis on 8/21 with hemoperitoneum. No active bleeding. Required a unit of PRBC on 9/4 in the setting of several fluid boluses.   - Goal hgb > 7.0, trend Hgb     #Alcohol Use Disorder  Counseled patient on complete alcohol cessation. Not interested in additional services at this time. Continue daily folic acid and thiamine.     #Lactic Acidosis  Intermittent Lactic acidosis in the setting of sepsis, ESRD, bleeding.     #Hypokalemia  Noted in the setting of high gastric output and improved once G-tube output decreased. CTM.      #Severe Malnutrition  Poor oral intake in the setting of necrotizing pancreatitis. PEG tube placed 7/13. Dietitian consulted and continuous tube feeds with goal of 65 ml/hr with continuous pancreatic enzymes  - Continuous tube feeds 50 mL overnight.       Diet: Regular Diet Adult  Adult Formula Drip Feeding: Continuous Vital 1.5; Jejunostomy; Goal Rate: 55; mL/hr; Medication - Feeding Tube Flush Frequency: At least 15-30 mL water before and after medication administration and with tube clogging; Amount to Send (Nutrition us...    DVT Prophylaxis: Pneumatic Compression Devices  Rdz Catheter: Not present  Central Lines: PRESENT  PICC Triple Lumen 06/28/21 Left-Site Assessment: WDL  CVC Double Lumen Right-Site Assessment: WDL    Code Status: Full Code      Disposition Plan   Expected discharge: 09/08/2021   recommended to transitional care unit once safe disposition plan/ TCU bed  available. May also transfer back to [facility].     The patient's care was discussed with the Bedside Nurse, Care Coordinator/ and Patient.    Patient seen by and staffed with Dr. Joshua.     Anali Jackson MD   Med/Peds, PGY-2  Hospitalist Service, [address]  Securely message with the Vocera Web Console (learn more here)  Text page via [facility] Paging/Directory  Please see sign in/sign out for up to date coverage information  _____________________________________________________________________    Interval History   Patient feeling well this morning - denies increasing pain or nausea. Notes that he has intermittent desire to eat, but that by the time he gets food to his room he is no longer hungry. Will continue working with PT and on his PO intake.     The remainder of a 4 point ROS is negative unless otherwise noted above.    Data reviewed today: I reviewed all medications, new labs and imaging results over the last 24 hours.    Physical Exam   Vital Signs: Temp: 97.9  F (36.6  C) Temp src: Oral BP: 114/58 Pulse: 111   Resp: 19 SpO2: 96 % O2 Device: Nasal cannula Oxygen Delivery: 4 LPM  Weight: 129 lbs 13.62 oz  Gen: Awake, lying in bed, comfortable and in a good mood this morning and leaving for dialysis   HEENT: NC/AT, sclera anicteric  CV: Regular rhythm, tachycardic   Resp: Non-labored breathing on room air, diminished breath sounds in lower right base  Abd: soft, mild tenderness to palpation throughout, PEG site c/d/i  Extrem: Warm and well perfused with no edema.

## 2021-09-06 NOTE — PHARMACY-VANCOMYCIN DOSING SERVICE
Pharmacy Vancomycin Note  Date of Service 2021  Patient's  1990   30 year old, male    Indication: Sepsis  Goal Trough Level: 15-20 mg/L  Day of Therapy: 3  Current Vancomycin regimen:  Intermittent Vanco dosing based around HD schedule    Current estimated CrCl = Estimated Creatinine Clearance: 26.8 mL/min (A) (based on SCr of 3.56 mg/dL (H)).    Creatinine for last 3 days  2021:  4:14 AM Creatinine 2.78 mg/dL  2021:  6:37 AM Creatinine 3.56 mg/dL    Recent Vancomycin Levels (past 3 days)  2021:  6:46 AM Vancomycin 25.2 mg/L- Pre-HD level    Vancomycin IV Administrations (past 72 hours)                   vancomycin 1500 mg in 0.9% NaCl 250 ml intermittent infusion 1,500 mg (mg) 1,500 mg New Bag 21 0412                Nephrotoxins and other renal medications (From now, onward)    Start     Dose/Rate Route Frequency Ordered Stop    21 0305  vancomycin place smith - receiving intermittent dosing      1 each Intravenous SEE ADMIN INSTRUCTIONS 21 0305               Contrast Orders - past 72 hours (72h ago, onward)    None          Interpretation of levels and current regimen:  Trough level is  therapeutic level, but this was a pre-HD vancomycin level. Expected post-HD vanco level ~ 17-18    Renal Function: ESRD on Dialysis    Plan:  1.  Give 500mg today AFTER dialysis (~ 8mg/kg)  2.  Pharmacy will check trough levels as appropriate before next HD session.  3. Serum creatinine levels will NOT be ordered by pharmacy for this dialysis patient.    Girish PedrozaD, BCPS    [phone]  Pager [phone]          .

## 2021-09-06 NOTE — PROGRESS NOTES
Nephrology Progress Note  09/06/2021     Dax Villareal is a 30 year old male with h/o ETOH hepatitis, end stage liver disease, RADHA on HD, transferred from [facility] in [place] for septic shock on 6/28/21 for treatment of necrotizing pancreatitis and decompensated liver disease. He was initially admitted to [facility] 5/19/21.       Assessment & Recommendations:     1 ESRD- Patient has developed ESRD given his unrecovered RADHA.    - Has Right TDC   - Running today and will continue MWF schedule   - CRRT/HD consent obtained from sister Noni per phone     2. Volume status - No dyspnea/edema. On supplemental oxygen this morning.  Pre run weight 58.9 kg. Admission weight 80.2 kg. SBP / Intake 1395 ml/Output: UO 40 ml, Drain 2575 ml.    - UF goal today is 0.5 liters     3. CV - Shock 2/2 Sepsis: Echo on 8/14/21 showed EF of 55 % w/dilated IVC. SBP /     4. ETOH hepatitis/ recurrent ascites, HE - Patient with heavy ETOH use. Last ETOH unknown. TB improved from 20's to 1.4. Off Lactulose/Rifax. Remains on  thiamine, Folate.   - Per GI     5. Electrolytes - Chronic hyponatremia with pre run Na 127, K 3.8.     - He should be on a 1.5 liter/day fluid restriction given that he is anuric on HD     6.  Acid base - Mild metabolic acidosis. Bicarb 18.     Will improve with HD     7. BMD - Ca 9.2, Phos 6.9, albumin 1.5    Started Renvela 0.8 g TID for phos binder    8. Antimicrobials - Vanco, Metronidazole and Maxipime for intra abdominal infection. Vanc level 25.2    9. Anemia - Hgb 8.6   - Iron studies 8/21: Ferritin 2186, Fe 28, IS 21   - He is not iron deficient   - Continue Epo 6000 unit(s) IV on HD. He is likely quite Epo resistant given infection/inflammatory state     Recommendations were communicated to primary team via progress note    Tory Toledo, NP   [phone]    Interval History :   Nursing and provider notes from last 24 hours reviewed.  No acute renal changes   Scheduled for routine HD today    Review  "of Systems:   I reviewed the following systems:  Denies CP, dyspnea, edema  ON TF and trying to eat more  Anuric    Physical Exam:   I/O last 3 completed shifts:  In: 925 [NG/GT:210]  Out: 2390 [Urine:40; Emesis/NG output:2350]   /58   Pulse 111   Temp 97.9  F (36.6  C) (Oral)   Resp 19   Ht 1.676 m (5' 6\")   Wt 58.9 kg (129 lb 13.6 oz)   SpO2 96%   BMI 20.96 kg/m       GENERAL APPEARANCE: Comfortable  EYES:  scleral icterus, pupils equal  Pulmonary: CTA   CV: tachy   - Edema - no LE edema  GI: Non tender. PEG present.   SKIN: no rash, warm, dry, no cyanosis  NEURO: alert/interactive  ACCESS: Right TDC     Labs:   All labs reviewed by me  Electrolytes/Renal -   Recent Labs   Lab Test 09/06/21  0646 09/05/21 0637 09/04/21 0414 09/03/21  0523   NA  --  127* 128* 123*   POTASSIUM  --  3.8 3.1* 3.4   CHLORIDE  --  95 94 87*   CO2  --  18* 22 19*   BUN  --  47* 32* 74*   CR  --  3.56* 2.78* 4.90*   GLC  --  123* 121* 146*   JNAENE  --  9.2 8.3* 9.6   MAG 2.2 2.1 2.0 2.7*   PHOS  --  6.4* 4.4 10.9*       CBC -   Recent Labs   Lab Test 09/05/21 0637 09/04/21 2031 09/04/21 0409 09/03/21  0523   WBC 11.6*  --  9.8 15.7*   HGB 8.6* 8.0* 6.8* 7.8*     --  257 316       LFTs -   Recent Labs   Lab Test 09/04/21 0414 09/03/21  0523 09/02/21  1730   ALKPHOS 81 95 120   BILITOTAL 1.4* 2.0* 2.2*   ALT 29 31 40   AST 38 39 57*   PROTTOTAL 8.8 9.2* 10.7*   ALBUMIN 2.9* 2.3* 2.7*       Iron Panel -   Recent Labs   Lab Test 08/14/21 2055 07/19/21  0632   IRON 28* 31*   IRONSAT 21 43   JERRELL 2,186*  --          Imaging:      Current Medications:    sodium chloride 0.9%  250 mL Intravenous Once in dialysis/CRRT     sodium chloride 0.9%  300 mL Hemodialysis Machine Once     sodium bicarbonate  325 mg Per Feeding Tube Q4H    And     amylase-lipase-protease  1-2 capsule Per Feeding Tube Q4H     amylase-lipase-protease  2 capsule Oral TID w/meals     B and C vitamin Complex with folic acid  5 mL Per Feeding Tube Daily "     banatrol plus  1 packet Per Feeding Tube TID     ceFEPIme (MAXIPIME) IV  1 g Intravenous Q24H     epoetin charles-epbx (RETACRIT) inj ESRD  6,000 Units Intravenous Once in dialysis/CRRT     folic acid  1 mg Oral Daily     heparin lock flush  5-20 mL Intracatheter Q24H     melatonin  6 mg Oral At Bedtime     menthol   Transdermal Q8H     metroNIDAZOLE  500 mg Intravenous Q12H     - MEDICATION INSTRUCTIONS -   Does not apply Once     oxyCODONE  5 mg Oral Q8H     pantoprazole (PROTONIX) IV  40 mg Intravenous Daily with breakfast     protein modular  1 packet Per Feeding Tube Daily     sevelamer carbonate (RENVELA)  0.8 g Oral TID     sodium chloride (PF)  3 mL Intracatheter Q8H     sodium chloride (PF)  3 mL Intracatheter Q8H     sodium chloride (PF)  3 mL Intracatheter Q8H     vitamin B1  100 mg Oral Daily    Or     thiamine  100 mg Intravenous Daily     vancomycin  500 mg Intravenous Once     vancomycin place smith - receiving intermittent dosing  1 each Intravenous See Admin Instructions       - MEDICATION INSTRUCTIONS -       dextrose       heparin (porcine) 1,000 Units/hr (08/24/21 0907)     Tory Toledo, NP

## 2021-09-06 NOTE — PROGRESS NOTES
HEMODIALYSIS TREATMENT NOTE    Date: 9/6/2021  Time: 2:48 PM    Data:  Pre Wt: (P) 58.9 kg (129 lb 13.6 oz)   Desired Wt: 58.4 kg   Weight change: -0.5 kg  Ultrafiltration - Post Run Net Total Removed (mL): 500 mL  Vascular Access Status: CVC  patent  Dialyzer Rinse: Streaked  Total Blood Volume Processed: 49.53 L   Total Dialysis (Treatment) Time: 3   Dialysate Bath: K 3, Ca 2.25  Heparin: None    Lab:   No    Assessment/Interventions:  Patient to HD with TF running 55 ml/hr. Patient ran 3 hours HD via CVC on K 3, Ca 2.25 bath. Patient A&O and requested frequent repositioning.Patient given oral dilaudid during HD for generalized pain and IV epoetin administered IV. Patient tolerated HD well and end of HD VSS, hand of report to PCN.       Plan:    Per renal team.

## 2021-09-06 NOTE — PROVIDER NOTIFICATION
Provider notified (name): Daniela Ruiz 3  Reason for notification: Vancomycin level 25.2 per lab.     Response from provider:

## 2021-09-07 ENCOUNTER — APPOINTMENT (OUTPATIENT)
Dept: CT IMAGING | Facility: CLINIC | Age: 31
End: 2021-09-07
Attending: INTERNAL MEDICINE
Payer: MEDICAID

## 2021-09-07 ENCOUNTER — APPOINTMENT (OUTPATIENT)
Dept: OCCUPATIONAL THERAPY | Facility: CLINIC | Age: 31
End: 2021-09-07
Attending: INTERNAL MEDICINE
Payer: MEDICAID

## 2021-09-07 LAB
ANION GAP SERPL CALCULATED.3IONS-SCNC: 13 MMOL/L (ref 3–14)
BACTERIA BLD CULT: NO GROWTH
BACTERIA BLD CULT: NO GROWTH
BUN SERPL-MCNC: 35 MG/DL (ref 7–30)
CALCIUM SERPL-MCNC: 8.6 MG/DL (ref 8.5–10.1)
CHLORIDE BLD-SCNC: 95 MMOL/L (ref 94–109)
CO2 SERPL-SCNC: 20 MMOL/L (ref 20–32)
CREAT SERPL-MCNC: 2.76 MG/DL (ref 0.66–1.25)
CRP SERPL-MCNC: 53 MG/L (ref 0–8)
ERYTHROCYTE [DISTWIDTH] IN BLOOD BY AUTOMATED COUNT: 16.5 % (ref 10–15)
GFR SERPL CREATININE-BSD FRML MDRD: 29 ML/MIN/1.73M2
GLUCOSE BLD-MCNC: 138 MG/DL (ref 70–99)
HCT VFR BLD AUTO: 26.4 % (ref 40–53)
HGB BLD-MCNC: 8.5 G/DL (ref 13.3–17.7)
MAGNESIUM SERPL-MCNC: 1.9 MG/DL (ref 1.6–2.3)
MCH RBC QN AUTO: 30.9 PG (ref 26.5–33)
MCHC RBC AUTO-ENTMCNC: 32.2 G/DL (ref 31.5–36.5)
MCV RBC AUTO: 96 FL (ref 78–100)
PHOSPHATE SERPL-MCNC: 4.8 MG/DL (ref 2.5–4.5)
PLATELET # BLD AUTO: 237 10E3/UL (ref 150–450)
POTASSIUM BLD-SCNC: 3.7 MMOL/L (ref 3.4–5.3)
RBC # BLD AUTO: 2.75 10E6/UL (ref 4.4–5.9)
SODIUM SERPL-SCNC: 128 MMOL/L (ref 133–144)
WBC # BLD AUTO: 11.2 10E3/UL (ref 4–11)

## 2021-09-07 PROCEDURE — 83735 ASSAY OF MAGNESIUM: CPT | Performed by: INTERNAL MEDICINE

## 2021-09-07 PROCEDURE — C9113 INJ PANTOPRAZOLE SODIUM, VIA: HCPCS | Performed by: INTERNAL MEDICINE

## 2021-09-07 PROCEDURE — 36592 COLLECT BLOOD FROM PICC: CPT | Performed by: STUDENT IN AN ORGANIZED HEALTH CARE EDUCATION/TRAINING PROGRAM

## 2021-09-07 PROCEDURE — 250N000013 HC RX MED GY IP 250 OP 250 PS 637: Performed by: STUDENT IN AN ORGANIZED HEALTH CARE EDUCATION/TRAINING PROGRAM

## 2021-09-07 PROCEDURE — 250N000013 HC RX MED GY IP 250 OP 250 PS 637: Performed by: INTERNAL MEDICINE

## 2021-09-07 PROCEDURE — 250N000013 HC RX MED GY IP 250 OP 250 PS 637: Performed by: PEDIATRICS

## 2021-09-07 PROCEDURE — 86140 C-REACTIVE PROTEIN: CPT | Performed by: INTERNAL MEDICINE

## 2021-09-07 PROCEDURE — 84100 ASSAY OF PHOSPHORUS: CPT | Performed by: STUDENT IN AN ORGANIZED HEALTH CARE EDUCATION/TRAINING PROGRAM

## 2021-09-07 PROCEDURE — 97535 SELF CARE MNGMENT TRAINING: CPT | Mod: GO

## 2021-09-07 PROCEDURE — 74177 CT ABD & PELVIS W/CONTRAST: CPT | Mod: 26 | Performed by: RADIOLOGY

## 2021-09-07 PROCEDURE — 99233 SBSQ HOSP IP/OBS HIGH 50: CPT | Mod: GC | Performed by: STUDENT IN AN ORGANIZED HEALTH CARE EDUCATION/TRAINING PROGRAM

## 2021-09-07 PROCEDURE — 250N000011 HC RX IP 250 OP 636: Performed by: INTERNAL MEDICINE

## 2021-09-07 PROCEDURE — 250N000011 HC RX IP 250 OP 636: Performed by: STUDENT IN AN ORGANIZED HEALTH CARE EDUCATION/TRAINING PROGRAM

## 2021-09-07 PROCEDURE — 999N000127 HC STATISTIC PERIPHERAL IV START W US GUIDANCE

## 2021-09-07 PROCEDURE — 74177 CT ABD & PELVIS W/CONTRAST: CPT

## 2021-09-07 PROCEDURE — 85027 COMPLETE CBC AUTOMATED: CPT | Performed by: STUDENT IN AN ORGANIZED HEALTH CARE EDUCATION/TRAINING PROGRAM

## 2021-09-07 PROCEDURE — 99232 SBSQ HOSP IP/OBS MODERATE 35: CPT | Mod: GC | Performed by: INTERNAL MEDICINE

## 2021-09-07 PROCEDURE — 120N000002 HC R&B MED SURG/OB UMMC

## 2021-09-07 PROCEDURE — 80048 BASIC METABOLIC PNL TOTAL CA: CPT | Performed by: STUDENT IN AN ORGANIZED HEALTH CARE EDUCATION/TRAINING PROGRAM

## 2021-09-07 PROCEDURE — 97110 THERAPEUTIC EXERCISES: CPT | Mod: GO

## 2021-09-07 RX ORDER — IOPAMIDOL 755 MG/ML
80 INJECTION, SOLUTION INTRAVASCULAR ONCE
Status: COMPLETED | OUTPATIENT
Start: 2021-09-07 | End: 2021-09-07

## 2021-09-07 RX ORDER — GUAR GUM
1 PACKET (EA) ORAL 3 TIMES DAILY
Status: DISCONTINUED | OUTPATIENT
Start: 2021-09-07 | End: 2021-10-12 | Stop reason: HOSPADM

## 2021-09-07 RX ADMIN — SODIUM BICARBONATE 325 MG: 325 TABLET ORAL at 08:00

## 2021-09-07 RX ADMIN — SODIUM BICARBONATE 325 MG: 325 TABLET ORAL at 00:34

## 2021-09-07 RX ADMIN — HYDROMORPHONE HYDROCHLORIDE 1 MG: 1 SOLUTION ORAL at 10:43

## 2021-09-07 RX ADMIN — PANCRELIPASE 2 CAPSULE: 24000; 76000; 120000 CAPSULE, DELAYED RELEASE PELLETS ORAL at 13:30

## 2021-09-07 RX ADMIN — HYDROMORPHONE HYDROCHLORIDE 1 MG: 1 SOLUTION ORAL at 19:44

## 2021-09-07 RX ADMIN — PANCRELIPASE 2 CAPSULE: 24000; 76000; 120000 CAPSULE, DELAYED RELEASE PELLETS ORAL at 14:00

## 2021-09-07 RX ADMIN — HYDROXYZINE HYDROCHLORIDE 50 MG: 25 TABLET, FILM COATED ORAL at 04:36

## 2021-09-07 RX ADMIN — ACETAMINOPHEN 325 MG: 325 SOLUTION ORAL at 13:30

## 2021-09-07 RX ADMIN — FOLIC ACID 1 MG: 1 TABLET ORAL at 07:59

## 2021-09-07 RX ADMIN — PANTOPRAZOLE SODIUM 40 MG: 40 INJECTION, POWDER, FOR SOLUTION INTRAVENOUS at 08:01

## 2021-09-07 RX ADMIN — PANCRELIPASE 2 CAPSULE: 24000; 76000; 120000 CAPSULE, DELAYED RELEASE PELLETS ORAL at 00:35

## 2021-09-07 RX ADMIN — SODIUM BICARBONATE 325 MG: 325 TABLET ORAL at 13:30

## 2021-09-07 RX ADMIN — HYDROMORPHONE HYDROCHLORIDE 1 MG: 1 SOLUTION ORAL at 14:34

## 2021-09-07 RX ADMIN — SODIUM BICARBONATE 325 MG: 325 TABLET ORAL at 16:30

## 2021-09-07 RX ADMIN — SEVELAMER CARBONATE 0.8 G: 800 POWDER, FOR SUSPENSION ORAL at 14:48

## 2021-09-07 RX ADMIN — METRONIDAZOLE 500 MG: 500 INJECTION, SOLUTION INTRAVENOUS at 08:02

## 2021-09-07 RX ADMIN — Medication 1 PACKET: at 14:43

## 2021-09-07 RX ADMIN — OXYCODONE HYDROCHLORIDE 5 MG: 5 SOLUTION ORAL at 16:03

## 2021-09-07 RX ADMIN — THIAMINE HCL TAB 100 MG 100 MG: 100 TAB at 07:59

## 2021-09-07 RX ADMIN — HYDROXYZINE HYDROCHLORIDE 50 MG: 25 TABLET, FILM COATED ORAL at 20:13

## 2021-09-07 RX ADMIN — HYDROMORPHONE HYDROCHLORIDE 1 MG: 1 SOLUTION ORAL at 04:28

## 2021-09-07 RX ADMIN — ONDANSETRON 4 MG: 2 INJECTION INTRAMUSCULAR; INTRAVENOUS at 17:31

## 2021-09-07 RX ADMIN — Medication 5 ML: at 16:03

## 2021-09-07 RX ADMIN — Medication 1 PACKET: at 09:29

## 2021-09-07 RX ADMIN — METRONIDAZOLE 500 MG: 500 INJECTION, SOLUTION INTRAVENOUS at 20:58

## 2021-09-07 RX ADMIN — IOPAMIDOL 80 ML: 755 INJECTION, SOLUTION INTRAVENOUS at 10:02

## 2021-09-07 RX ADMIN — OXYCODONE HYDROCHLORIDE 5 MG: 5 SOLUTION ORAL at 08:01

## 2021-09-07 RX ADMIN — MELATONIN TAB 3 MG 6 MG: 3 TAB at 00:34

## 2021-09-07 RX ADMIN — SODIUM BICARBONATE 325 MG: 325 TABLET ORAL at 19:57

## 2021-09-07 RX ADMIN — Medication 1 PACKET: at 20:09

## 2021-09-07 RX ADMIN — PANCRELIPASE 1 CAPSULE: 24000; 76000; 120000 CAPSULE, DELAYED RELEASE PELLETS ORAL at 16:30

## 2021-09-07 RX ADMIN — PANCRELIPASE 2 CAPSULE: 24000; 76000; 120000 CAPSULE, DELAYED RELEASE PELLETS ORAL at 08:00

## 2021-09-07 RX ADMIN — PANCRELIPASE 1 CAPSULE: 24000; 76000; 120000 CAPSULE, DELAYED RELEASE PELLETS ORAL at 19:57

## 2021-09-07 RX ADMIN — OXYCODONE HYDROCHLORIDE 5 MG: 5 SOLUTION ORAL at 00:36

## 2021-09-07 RX ADMIN — Medication 5 ML: at 10:43

## 2021-09-07 RX ADMIN — CEFEPIME HYDROCHLORIDE 1 G: 1 INJECTION, POWDER, FOR SOLUTION INTRAMUSCULAR; INTRAVENOUS at 19:49

## 2021-09-07 ASSESSMENT — PAIN DESCRIPTION - DESCRIPTORS
DESCRIPTORS: ACHING;SORE

## 2021-09-07 ASSESSMENT — ACTIVITIES OF DAILY LIVING (ADL)
ADLS_ACUITY_SCORE: 17
ADLS_ACUITY_SCORE: 19
ADLS_ACUITY_SCORE: 17
ADLS_ACUITY_SCORE: 19
ADLS_ACUITY_SCORE: 19
ADLS_ACUITY_SCORE: 17

## 2021-09-07 ASSESSMENT — MIFFLIN-ST. JEOR: SCORE: 1467.75

## 2021-09-07 NOTE — PROGRESS NOTES
[facility]    Medicine Progress Note - Hospitalist Service, Daniela 3       Date of Admission:  6/28/2021    Assessment & Plan    Dax Villareal is a 30 year old male with hx of alcohol use disorder admitted on 6/28/2021 after recent prolonged admission at [facility] in [place] who has severe acute alcoholic hepatitis c/b HE, ESRD requiring HD, and malnutrition in the setting of acute necrotizing pancreatitis, s/p PEG-J placement on 7/13. He arrived with acute respiratory failure and septic shock that have resolved, and his secondary bacterial peritonitis has been appropriately treated. He requires continued management for ESRD, hepatic encephalopathy, infected pancreatic pseudocyst, nutritional support via PEG-J, and persistent right pleural effusion. Clinically improving after multiple necrosectomies. Increasing leukocytosis and hypotension on 9/2 prompting resumption of IV antibiotics - has been stable since that time. Repeat CT with interval improvement in fluid collections.     Today:   - Continue cefepime, flagyl, and vanc EOT tomorrow  - Inserted Gastrostomy tube inserted ~1cm today by GI     #Septic Shock  #Necrotizing alcoholic pancreatitis w/ infected pseudocyst  #Hepatic Fluid collection  #Secondary Bacterial Peritonitis 2/2 infected pseudocyst with VRE  Admitted to the [facility] ICU on 6/28/2021 from [facility] in [place] for septic shock due to necrotizing pancreatitis. CT on admit showing mature collection with enhancing wall measuring ~[id number]. IR placed perc drain 6/29. Repeat imaging 7/18 revealed walled off necrotic collection amenable to endoscopic transluminal drainage with cystgastrostomy stenting with necrosectomy on 7/21/21. Complete necrosectomy on 8/11. Antibiotics (cefepime and metronidazole) were discontinued on 8/25 after left flank drain removed. Patient with worsening hypotension and leukocytosis on 9/2 for which he received several fluid  boluses and antibiotic therapy resumed. Favor abdominal etiology, repeat CT on 9/7 with interval improvement in fluid collections - will complete 7d course of abx per ID.  - follow BCx (9/2): NGTD  - Continue cefepime, flagyl, and vanc EOT tomorrow (7d course)     #Gastric Outlet Obstruction  High G tube output due to the cystogastric connection/fluid drainage noted on 8/5. Per GI, possibly related to gastric outlet from severe abdominal inflammation and is expected. KUB negative for obstructive gas pattern. Continues to have moderate NG output - stable to improved.       #Hyponatremia, stable  Patient with down-trending sodium the week of 8/27; likely due to substantial NG output and low solute intake and hepatic dysfunction. Stable     #Right Pancreatic Hydrothorax, stable  Increased dyspnea with CXR on 7/19 with further imaging and thoracentesis confirming a hepatic/pancreatic hydrothorax. Thoracentesis PRN for dyspnea, most recently 9/3.     #Alcoholic Hepatitis  #Coagulation Defect  Received 1 week of steroids at [facility] for elevated INR and altered mental status. Not a liver transplant candidate. Mental status improved with aggressive lactulose and rifaxamin, which was discontinued without return of AMS.     ESRD due to ATN  Hyperphosphatemia  Stage III RADHA due to ATN from septic shock without recovery for >3 months. Line is TDC from PTA. Nephrology dialyzed on Tu, Th, Sa schedule.  - Will need follow-up and outpatient dialysis  - Sevelemer powder TID with TF for hyperphosphatemia      #Anemia due to Chronic Illness  #Hematochezia, resolved  #Hemoperitoneum  Baseline hgb 17. Running between 6-8 in setting of chronic illness, new ESRD with epocrit per Nephrology, frequent lab draws and procedures. Hematochezia on 8/8 after starting standard intensity heparin, now resolved. Paracentesis on 8/21 with hemoperitoneum. No active bleeding. Required a unit of PRBC on 9/4 in the setting of several fluid boluses.   -  Goal hgb > 7.0, trend Hgb     #Alcohol Use Disorder  Counseled patient on complete alcohol cessation. Not interested in additional services at this time. Continue daily folic acid and thiamine.     #Lactic Acidosis  Intermittent Lactic acidosis in the setting of sepsis, ESRD, bleeding.     #Hypokalemia  Noted in the setting of high gastric output and improved once G-tube output decreased. CTM.      #Severe Malnutrition  Poor oral intake in the setting of necrotizing pancreatitis. PEG tube placed 7/13. Dietitian consulted and continuous tube feeds with goal of 65 ml/hr with continuous pancreatic enzymes  - Continuous tube feeds 50 mL overnight.       Diet: Regular Diet Adult  Adult Formula Drip Feeding: Continuous Vital 1.5; Jejunostomy; Goal Rate: 60; mL/hr; Medication - Feeding Tube Flush Frequency: At least 15-30 mL water before and after medication administration and with tube clogging; Amount to Send (Nutrition us...    DVT Prophylaxis: Pneumatic Compression Devices  Rdz Catheter: Not present  Central Lines: PRESENT       Code Status: Full Code      Disposition Plan   Expected discharge: 09/10/2021   recommended to transitional care unit once safe disposition plan/ TCU bed available. May also transfer back to [facility].     The patient's care was discussed with the Bedside Nurse, Care Coordinator/ and Patient.    Patient seen by and staffed with Dr. Gresham.     Anali Jackson MD   Med/Peds, PGY-2  Hospitalist Service, [address]  Securely message with the Vocera Web Console (learn more here)  Text page via [facility] Paging/Directory  Please see sign in/sign out for up to date coverage information  _____________________________________________________________________    Interval History   Patient feeling well this morning - denies increasing pain or nausea. Hungry and ordering chicken wings - some food at bedside as well. He is  wondering about the CT this afternoon.     The remainder of a 4 point ROS is negative unless otherwise noted above.    Data reviewed today: I reviewed all medications, new labs and imaging results over the last 24 hours.    Physical Exam   Vital Signs: Temp: 97.8  F (36.6  C) Temp src: Oral BP: 102/51 Pulse: 108   Resp: 17 SpO2: 94 % O2 Device: Nasal cannula Oxygen Delivery: 4 LPM  Weight: 129 lbs 13.62 oz  Gen: Awake, lying in bed, comfortable and in a good mood this morning   HEENT: NC/AT, sclera anicteric  CV: Regular rhythm, tachycardic   Resp: Non-labored breathing on room air, diminished breath sounds in lower right base  Abd: soft, mild tenderness to palpation throughout, PEG site c/d/i  Extrem: Warm and well perfused with no edema.

## 2021-09-07 NOTE — PLAN OF CARE
"Cares 0700 to 2300    BP (!) 101/39   Pulse 114   Temp 98.2  F (36.8  C) (Oral)   Resp 20   Ht 1.676 m (5' 6\")   Wt 58.9 kg (129 lb 13.6 oz)   SpO2 96%   BMI 20.96 kg/m      VSS ex baseline hypotensive. Tachycardia. Pt demands to be on 4L NC, but takes NC off nose multiple times during the day for long stretches. Did not c/o SOB, O2 sats in 90s on room air. More alert today, making jokes with staff, ordered pizza for unit. Incontinent of stool 1x, 2 loose BM's today. Got dialysis today, 0.5L removed. Pt ate small bites of [employer] burger & fries. Endorsed nausea with med administration via PEG-J tube. Zofran IV given 2x with relief. G tube to gravity with frequent output. Meds given via J tube. TFs at 55 ml/hr through J.     Pt received bed bath w/CHG wipes, linens changed 2x, lotion applied 2x, back rub & foot rubs given. Mepilex applied on coccyx for blanchable redness. PEG dressing changed per POC, crusty scab around site. No emesis or dry-heaving today. Encouraged pt to turn in bed q2h. Pt prefers to lay on R side.     Continue to monitor pt.   "

## 2021-09-07 NOTE — PROGRESS NOTES
[facility]    GASTROENTEROLOGY PROGRESS NOTE    ASSESSMENT/RECOMMENDATIONS:  30 year old male with a history of alcohol use disorder who presented to OSH 5/19 for abdominal pain, nausea and vomiting, and found to have alcohol hepatitis, ESLD, RADHA on HD, septic shock requiring pressors as well as necrotizing pancreatitis with large evolving necrotic collection. Transferred to [facility] for consideration of drainage of presumed infected necrotic collection. S/p EUS transluminal and percutaneous drainage.    #. Acute necrotizing pancreatitis with presumed infected walled off necrotic collection s/p percutaneous and endoscopic drainage   #. Septic shock, resolved  #. Leukocytosis, resolved  #. Ascites/Peritonitis  Unclear evolution of pancreatitis history but had a recent CT scan with large evolving necrotic collection, presumed infected. No obvious gas in collection. CT on admission to [facility] showing mature collection with enhancing wall measuring ~[id number]. With profound coagulopathy (liver disease + malnutrition +sepsis), INR 3.8 on transfer he was given FFP and Vit K, IR placed perc drain 6/29. Repeat imaging 7/18 with still large walled off necrotic collection that appeared amenable to endoscopic transluminal drainage, underwent cystgastrostomy stenting on 7/21/21, extensive debridement/necrosectomy 8/6, final near complete/suffiecient debridement with evacuation of solid necrosis in right side of cavity 8/11,two 10 x 1 cm Solus stents placed across cavity, no current plans for future necrosectomies.  Etiology: alcohol  Date of onset: 5/19  BISAP score on admission: unclear  Concurrent organ failure: respiratory (now extubated), renal (on HD), liver, cardiogenic (weaned off pressors 6/29)  Thromboses: none  Diabetes: no  Current nutrition access: PEG-J + po diet   Intervention:                 6/30 - Left IR RP perc drain (24F)                 7/2 - Diagnostic paracentesis  (WBC 2663 - 74% PMN, SAAG <1.1, protein 4.1)                  7/5 - Repeat Paracentesis (WBC 1801 - 61% PMN, lipase 48)                 7/13 - Diag/therapeutic paracentesis ()                 7/13 - PEG-J placement with T-tag gastropexy                 7/21 - Repeat paracentesis                 7/21 - EUS-cystgastrostomy, Axios with co-axial Viabil                 7/28 - EGD, stent replacement (transluminal Axios with coaxial Solus), PEG-J removal (buried bumper) and replacement of new PEG-J with one new T-tag                 8/6 - EGD with necrosectomy and near complete necrosectomy                 8/11 - EGD with completion necrosectomy, no e/o bowel fistula     8/25 - IR perc drain removed    #. Severe malnutrition in the context of chronic illness  #. Gastric outlet obstruction s/p PEG-J placement  #. Concern for bowel fistula  Patient with very poor oral intake over the course of his illness and was not meeting calorie requirements orally. S/p PEG-J with T-tag gastropexy (x3) 7/13.     CT Abdomen pelvis today with decreasing size of necrotizing pancreatitis. Gastrostomy tube was partially within the anterior abdominal wall fascia. Repositioned at bedside (inserted about 1cm further).     -- Current abx: flagyl + cefepime + vancomycin; per ID   * Given CT scan today with improvement in fluid collections - plan for antibiotics until 9/8/21  -- Continue jejunostomy feeding  -- Continue tube feeding with creon  -- Diet by mouth as tolerated      Panc/bili GI team will follow peripherally. Please call with status changes or questions.    GI Follow up: Clinic visit with Dr. Chacko on 9/30/21; scheduled     The patient was discussed and plan agreed upon with GI staff, Dr. Ginna Gore MD  GI Fellow  [id number]  _______________________________________________________________  S:   - 24 hour events and nursing notes reviewed  - Doing well this morning; denies any new or worsening pain  -  "Nausea at baseline  - No fevers in the last 24 hours. Denies any hematochezia or melena.     O:  Blood pressure 102/51, pulse 108, temperature 97.8  F (36.6  C), temperature source Oral, resp. rate 17, height 1.676 m (5' 6\"), weight 58.9 kg (129 lb 13.6 oz), SpO2 94 %.  Gen: Cachectic.   HEENT: Conjunctiva anicteric, poor dentition  Resp: non labored breathing on ambient air  Abd: abdominal distention, non tender throughout. PEG-J in place, c/d/i (adjusted by 1cm today; 3cm)  Skin: no jaundice  Ext: warm, well perfused. HD catheter in place, right upper chest.   Mental status/Psych: alert and oriented. No asterixis    LABS:  BMP  Recent Labs   Lab 09/07/21  0632 09/05/21  0637 09/04/21  0414 09/03/21  0523   * 127* 128* 123*   POTASSIUM 3.7 3.8 3.1* 3.4   CHLORIDE 95 95 94 87*   JANENE 8.6 9.2 8.3* 9.6   CO2 20 18* 22 19*   BUN 35* 47* 32* 74*   CR 2.76* 3.56* 2.78* 4.90*   * 123* 121* 146*     CBC  Recent Labs   Lab 09/07/21  0632 09/05/21  0637 09/04/21  0409 09/03/21  0523   WBC 11.2* 11.6* 9.8 15.7*   RBC 2.75* 2.82* 2.23* 2.61*   HGB 8.5* 8.6* 6.8* 7.8*   HCT 26.4* 26.0* 20.8* 24.4*   MCV 96 92 93 94   MCH 30.9 30.5 30.5 29.9   MCHC 32.2 33.1 32.7 32.0   RDW 16.5* 16.4* 16.8* 16.7*    262 257 316     LFTs  Recent Labs   Lab 09/04/21  0414 09/03/21  0523 09/02/21  1730   ALKPHOS 81 95 120   AST 38 39 57*   ALT 29 31 40   BILITOTAL 1.4* 2.0* 2.2*   PROTTOTAL 8.8 9.2* 10.7*   ALBUMIN 2.9* 2.3* 2.7*      IMAGING:  CT Abdomen Pelvis w/contrast 9/6/21  1.  Evolving sequelae of necrotizing pancreatitis with cystgastrostomy  tube in unchanged position. Peripancreatic and walled off peritoneal  collections have overall decreased in size.  2.  Gastrostomy tube balloon appears to be partially within the  anterior abdominal wall fascia, possibly external to the stomach.  Recommended IR consult for further evaluation.  3.  Unchanged renal edema/hypoenhancement.  4.  Unchanged large right pleural " effusion.

## 2021-09-07 NOTE — PROGRESS NOTES
CLINICAL NUTRITION SERVICES - REASSESSMENT NOTE     Nutrition Prescription    RECOMMENDATIONS FOR MDs/PROVIDERS TO ORDER:  1. Continue with EN support to meet 100% of needs.     2. Continue with free water flushes @ 20 ml /hr @ continuous rate via feed and flush (to provides 480 ml water daily ). Patient is anuric and on HD. Therefore further need to increase free water flushes per team.     Malnutrition Status:    Severe malnutrition in the context of acute on chronic illness     Recommendations already ordered by Registered Dietitian (RD):  EN: Continue with Vital 1.5, increased volume to new goal rate @ 60 ml/hr to optimize intake.     Vital 1.5 Leno @ goal of  60ml/hr  (1440 ml/day) to provide: 2160 kcal's ( 34 kcal/kg), 97 gm PRO ( 1.5 gm/kg), 1100 ml free H2O, 269 gm CHO, and 8 gm soluble fiber daily.    Modules:   --Discontinued  Banatrol and ordered Nutrisource fiber, one packet TID ( per RN, Banatrol has a thick viscosity when it is mixed with water and it is hard to push through J-port).     --Continue with Prosource TF free one packet daily ( 90 kcal, 15 gm protein),   TF+ prosource = 1.8 gm protein/kg.       PERT: No changes to enzyme+ bicarb    Future/Additional Recommendations:  Monitor phos/mg++/K+ trend with non renal formula   Monitor loose stool trend, need to further increase soluble fiber       EVALUATION OF THE PROGRESS TOWARD GOALS   Diet:   Regular diet     Nutrition Support: ( on TF since 6/30 admit)    Dosing wt: Adjusted to 63 kg lowest wt this admit  Access: Jejunostomy tube of PEG/J ( G-tube open to gravity at all times per RN report).     EN: Vital 1.5 @ goal of  55 ml/hr (1320 ml/day) to provide 1980 kcals (31 kcal/kg), 89 gm PRO (1.4 gm/kg), 1008 ml free H2O, 246 gm CHO, and 7 gm soluble fiber daily.    Modules:   --Banatrol 1 packet TID   --Prosource TF Free, 1 packet daily ( 90 kcal + 15 gm protein) = 104 gm protein(1.7 gm/kg).       FWF: @ 20 ml/hr via feed and flush continuous rate  for patency ( patient on HD, further water flush adjustment per team).     PERT: Creon 24, 2 capsules + 325 mg bicarb solution, Q 4 hours via FT->  providing 3512 units lipase/total gm fat in tube feeds.      Intake:   PO: 0-bites only.      EN:  Tolerating EN. Per discussion with RN, patient with no N/V. G-tube is open to gravity at all times. Patient frequently holds TF during HD. Has not been getting adequate daily goal volume.      Average 7 day of EN intake (630 ml daily) ( met 48% of the TF goal volume) => provided on average ~ 950 kcal/day and 43 gm protein/day.    TF + Prosource Free => provided 1040 kcal/day ( 17 kcal/kg) and 58 gm protein ( 0.9 gm/kg).       Updated 9/3/21: ASSESSED NUTRITION NEEDS per 63 kg lower wt this admit:    Estimated Energy Needs: 1890 - 2205 kcals/day, (30-35 kcals/kg)   Justification: Hypermetabolism with chronic illness, ( pancreatitis/HD/ESLD)  Estimated Protein Needs: 95+ g/day (1.5 ++g/kg)   Justification: Pancreatitis and Hypercatabolism with critical illness / HD repletion   Estimated Fluid Needs: Per provider pending fluid status (ESLD/HD)      NEW FINDINGS   Chart reviewed:   --Prolonged hospitalization with septic shock.   --Patient was admitted on 6/28/2021 with acute respiratory failure and septic shock, necrotizing pancreatitis with infected pseudocyst, Secondary bacterial peritonitis due to infected pseudocyst and decompensated liver.     --PMH: Alcoholic hepatitis c/b ESLD with HE and HRS, requiring HD ( has been on HD x 3 month) and malnutrition in the setting of acute necrotizing pancreatitis, s/p PEG-J placement on 7/13/21.     --Admitted secondary to SBP (treated)  --ESRD ( Anuric and on HD)  --HE ( ammonia 28 on admit 6/28/21), Mental status improved with aggressive lactulose and rifaximin, which was discontinued.  --Necrotizing pancreatitis with infected pancreatic pseudocyst s/p RP Drain and Cystogastrostomy, and persistent right pleural effusion.   --8/25 -  IR perc drain removed, antibiotics stopped   --Multiple Necrosectomies, last one on   --Resp: On O2 (3-4 liters) due to hepatic and pancreatic hydrothorax, on thoracentesis PRN      WOC RN note on :  Peg tube wounds due to Moisture Associated Skin Damage (MASD)  Status: healing, now hypergranular tissue present    WT trend:   Pre HD run weight 58.9 kg (21) . Admission weight 80.2 kg (21). Significant wt loss since admit     Meds:  Sodium Bicarbonate/Creon with TFs, Creon with meals,   Nephronex 5 ml daily per FT, calcium carbonate 500 mg daily, Sevelamer carbonate 0.8 gm packet TID PO?   Remains on  thiamine, Folate.    Labs:   CRP: 53 (E), WBC:11.2  (Elevated),   Fecal elastase: 162 (L) on    BUN:35, Cr: 2.6, Na+: 128(L)-> On HD ->  (K+: 3.7,  Mg++: 1.9, Phos: 4.8 (H)  Bicarb: 20 (normal range)   Total bili: 1.4 (trending down slowly), INR: 1.81 (elevated)   B (elevated)     GI:  Off Lactulose/Rifaxamin. Having 3 stool daily on average     MALNUTRITION  % Intake: On TF's with frequent interruptions, average 7 day met 47% estimated TF goal volume    % Weight Loss: 27% net wt loss since admit   Subcutaneous Fat Loss: Global Severe / cachectic appearing  Muscle Loss: Global Severe / cachectic appearing  Fluid Accumulation/Edema: 1+ Generalized edema ( trace), 0 BLE   Malnutrition Diagnosis: Severe malnutrition in the context of Chronic condition     Previous Goals   Total avg nutritional intake to meet a minimum of 25 kcal/kg and 1.3 gm PRO/kg daily (per dosing wt 67.5 kg).  Evaluation: Not met for kcals.     Previous Nutrition Diagnosis  Inadequate protein-energy intake related to interruption to TF support with ongoing anorexia/poor po as evidenced by average 7 days TF met 70% of goal TF volume with minimal to 0% PO intake over the past 7 days.    Evaluation: No change    CURRENT NUTRITION DIAGNOSIS  Inadequate protein-energy intake related to not meeting daily goal TF volume and  ongoing poor po as evidenced by average 7 days TF met 47% of goal TF volume and PO remained minimal with significant wt loss since admit over the past 2 month.     INTERVENTIONS  Implementation  Enteral Nutrition:    EN: TF was switched over to Nova source renal last week, given elevated phos levels. However patient with nausea and inability to tolerate renal formula. TF switched back to Vital 1.5 on 9/3. Today, will increase volume to better meet estimated needs.     Feeding tube flush - continue with feed and flush, @ 20 ml/hr patency. Patient is anuric and on HD.       Modules: Discontinued Banatrol, ordered Nutrisource fiber TID via FT for frequent loose stool.     Goals  Total avg nutritional intake to meet a minimum of 30 kcal/kg and 1.5 gm PRO/kg daily (per dosing wt 63 kg dry wt on 9/3/21 ).    Monitoring/Evaluation  Progress toward goals will be monitored and evaluated per protocol.      Jesica Blunt RD/MARYELLEN  Pager [phone]

## 2021-09-07 NOTE — PROGRESS NOTES
ID [facility] Team Progress Note      Patient:  Dax Villareal, Date of birth 1990, Medical record number [id number]  Date of Visit:  September 3, 2021         Assessment and Recommendations:   Problem List:  1. Leukocytosis - resolved  2. Known hypotension, tachycardia   3. Fever - resolved  4. Right pleural effusion, known - s/p thoracentesis   5. Necrotizing alcoholic pancreatitis with infected pseudocyst, s/p cystogastrostomy with necrosectomies and s/p flank drain removal (08/25)  6. Septate ascites    Recommendations:  - Agree with antibiotics selected:   - IV Cefepime 1g Q24H   - IV Metronidazole 500mg Q12H   - Vancomycin (dosed by pharmacy)  - CT results show interval improvement/stablility of fluid collections. Continue antibiotics through tomorrow (09/08) and then discontinue.  - Follow blood cultures and pleural fluid culture; new blood culture along with urine culture if fever or WBC spike.    Discussion:  Suspect that the leukocytosis is secondary to probable bacterial growth in abdominal fluid pocket, possibly peripancreal or ascitic septa. Despite finishing long course of cefepime and metronidazole (08/06 - 08/25) with multiple other antibiotics prior to then, it is possible that the fluid collections were not sterile and that there is growth since discontinuing antibiotics. Paracentesis would likely be unhelpful for culture because antibiotics were initiated, and the ascites are septate so a sample from one area may not reflect a sample from another septa. SBP is of concern, however, the patient does not have significant or worsening abdominal pain and he feels well. Low concern for pulmonary cause. Pleural fluid with no growth to date. At this time would continue antibiotics and follow the patient's course, including new imaging today, prior to discontinuing antibiotics.    Ailyn Soto DDS  [facility] PGY-1        Interval History:   The patient reports he's feeling well today. No new or worsening  pain. No nausea beyond his baseline. No pain with urination. No rashes or skin changes. He denies SOB, but has been requesting nasal cannula at 4LPM from nursing staff. No chest pain. The patient spiked a fever over the weekend and vancomycin was added to antibiotic regimen. He received 1 unit PRBC for critical Hgb 6.8, has remained stable ~8.5. WBC improved to 11.2, CRP trending downwards, 53 today from 67 yesterday.        HPI:   Adopted from consult note and updated:     Dax Villareal is a 30 year old male with PMHx significant for alcohol use disorder who was admitted to [facility] in [place], [place] for abdominal pain, nausea and vomiting, found to have end stage liver disease c/b hepatic encephalopathy, acute renal failure requiring iHD, SBP and acute necrotizing pancreatitis.      He required respiratory support (BIPAP, ? Intubation) for respiratory failure and appeared to be in sepsis (lactate reported at 7.7 with abnormal LFTs). He was transferred to [facility] for prolonged admission and reportedly treated with steroids; kidney function and pancreatic enzymes normal on admission). Length of stay stated >40 days. CT imaging 6/19 with concern for necrotizing pancreatitis with follow up CT scan 6/25 with peripancreatic fluid collection and 9 cm soft tissue/hemorrhagic structure c/f internal hemorrhage of a pseudocyst. Given multiple antibiotics (Cefepime, Flagyl, Ciprofloxacin, micafungin). Hospital course reportedly complicated by hypoxemic respiratory failure requiring BIPAP, ARF requiring iHD, hepatic encephalopathy, and septic shock 2/2 necrotizing pancreatitis/pancreatic pseudocyst requiring pressor support. Ultimately transferred to [facility] for interventions 6/28. Admitted to the ICU.      Now status post perc IR drain placement 6/29 with cultures that remain NGTD/negative. No endoscopic drainage by GI was pursued given improvement in imaging 7/11. Additionally has had 3 paracenteses (7/2, 7/5,  and 7/13 with negative culture work up. Had PEGJ placed 7/13.    His left flank drain was removed and his antibiotics were discontinued on 08/25.              Review of Systems:     Full 9 pt ROS obtained and negative unless noted above in assessment and interval history.          Current Antimicrobials     Current:  - Cefepime (8/6 - 8/25) (9/2 - present)  - Metronidazole (8/6 - 8/25) (9/2 - present)  - Vancomcyin (6/29) (9/4)    Prior:  - Linezolid 7/21-7/30  - Meropenem 6/29-7/18  - Micafungin 6/29-7/1  - Cipro 7/19-7/21  - Ceftriaxone 7/21-7/24; 7/26-7/27  - Zosyn 7/27-8/6          Physical Exam:   Ranges for vital signs:  Temp:  [97.6  F (36.4  C)-98.2  F (36.8  C)] 98.2  F (36.8  C)  Pulse:  [105-121] 116  Resp:  [18-42] 18  BP: ()/(35-77) 97/35  SpO2:  [94 %-100 %] 94 %    Intake/Output Summary (Last 24 hours) at 9/3/2021 1336  Last data filed at 9/3/2021 1200  Gross per 24 hour   Intake 545 ml   Output 925 ml   Net -380 ml     Exam:  GENERAL:  Met patient in his inpatient room, resting comfortably in bed, alert and cooperative.   ENT:  Head is normocephalic, atraumatic. Oropharynx is moist without exudates or ulcers.  EYES:  Eyes have anicteric sclerae.    NECK:  Supple.  LUNGS:  Decreased lung sounds on right side, left side is CTA.  CARDIOVASCULAR:  Tachycardic rate and regular rhythm with no murmurs, gallops or rubs. Brisk radial pulse felt.  ABDOMEN:  Normal bowel sounds, soft, nontender. G-tube site without erythema or pain.  EXT: Extremities warm and without edema.  SKIN:  No acute rashes.  Lines are in place without any surrounding erythema or tenderness.  NEUROLOGIC:  Grossly nonfocal.         Laboratory Data:   Reviewed.  Pertinent for:    Laboratory Data:   No results found for: ACD4    Microbiology:  Culture   Date Value Ref Range Status   09/03/2021 No growth after 3 days  Preliminary   09/03/2021 No growth after 3 days  Preliminary   09/03/2021 No growth after 3 days  Preliminary    09/03/2021 No growth after 3 days  Preliminary   09/03/2021 No anaerobic organisms isolated after 3 days  Preliminary   09/02/2021 No growth after 4 days  Preliminary   09/02/2021 No growth after 4 days  Preliminary   08/20/2021 No Growth  Final   08/20/2021 No anaerobic organisms isolated  Final   08/13/2021 No Growth  Final   08/13/2021 No Growth  Final   08/09/2021 No Growth  Final   08/09/2021 No anaerobic organisms isolated  Final   07/30/2021 No Growth  Final   07/30/2021 No anaerobic organisms isolated  Final   07/30/2021 No Growth  Final   07/30/2021 No Growth  Final   07/30/2021 No Growth  Final   07/28/2021 No Growth  Final   07/28/2021 No Growth  Final   07/28/2021 No anaerobic organisms isolated  Final   07/27/2021 No Growth  Final   07/27/2021 No Growth  Final   07/26/2021 No Growth  Final   07/26/2021 No Growth  Final   07/21/2021 No Growth  Final   07/19/2021 2+ Candida albicans (A)  Final     Comment:     Susceptibilities not routinely done   07/19/2021 2+ Enterococcus faecium (A)  Final   07/16/2021 4+ Candida albicans (A)  Final     Comment:     Susceptibilities not routinely done   07/16/2021 1+ Normal fausto  Final   07/16/2021 No Growth  Final   07/16/2021 No Growth  Final   07/16/2021 No Growth  Final   07/14/2021 Culture in progress  Final   07/14/2021 4+ Enterococcus faecium VRE (A)  Final     Comment:     Preliminary result, confirmation pending.   07/14/2021 4+ Enterococcus faecium VRE (A)  Final     Comment:     Strain 2   07/14/2021 No anaerobic organisms isolated  Final   07/13/2021 No Growth  Final       Last check of C difficile  C Difficile Toxin B by PCR   Date Value Ref Range Status   08/05/2021 Negative Negative Final     Comment:     A negative result does not exclude actual disease due to C. difficile and may be due to improper collection, handling and storage of the specimen or the number of organisms in the specimen is below the detection limit of the assay.        Virology:      No results found for: H6RES    No results found for: EBVDN, EBRES, EBVDN, EBVSP, EBVPC, EBVPCR    BK viral loads No lab results found.    Inflammatory Markers    Recent Labs   Lab Test 09/07/21  0632 09/06/21  0711 08/07/21  0637 08/06/21  0655 08/05/21  0536 08/04/21  0347 08/03/21  0620 08/02/21  0603   CRP 53.0* 67.0* 81.0* 57.0* 76.0* 89.0* 88.0* 93.0*       Immune Globulin Studies   No lab results found.    Metabolic Studies       Recent Labs   Lab Test 09/07/21  0632 09/06/21  0646 09/05/21  0637 09/04/21  0414 09/03/21  2150 09/03/21  0523 09/02/21  2031 09/02/21  1730 09/02/21  0545 09/01/21  0646   *  --  127* 128*  --  123*  --  124*  --  127*   POTASSIUM 3.7  --  3.8 3.1*  --  3.4  --  3.5  --  3.6   CHLORIDE 95  --  95 94  --  87*  --  85*  --  90*   CO2 20  --  18* 22  --  19*  --  21  --  25   ANIONGAP 13  --  14 12  --  17*  --  18*  --  12   BUN 35*  --  47* 32*  --  74*  --  68*  --  35*   CR 2.76*  --  3.56* 2.78*  --  4.90*  --  4.70*  --  2.90*   GFRESTIMATED 29*  --  22* 29*  --  15*  --  15*  --  28*   *  --  123* 121*  --  146*  --  148*  --  106*   JANENE 8.6  --  9.2 8.3*  --  9.6  --  9.8  --  9.4   PHOS 4.8*  --  6.4* 4.4  --  10.9*   < >  --   --  7.6*   MAG 1.9 2.2 2.1 2.0  --  2.7*   < >  --   --  2.3   LACT  --   --   --  1.8 2.2*  --   --   --    < >  --     < > = values in this interval not displayed.       Hepatic Studies    Recent Labs   Lab Test 09/04/21 0414 09/03/21  0523 09/02/21  1730 08/31/21  0837 08/27/21  0731 08/26/21  0657 08/14/21 2055 08/09/21  0326   BILITOTAL 1.4* 2.0* 2.2* 1.7* 1.5* 1.6*  --  2.5*   ALKPHOS 81 95 120 100 104 108  --  121   ALBUMIN 2.9* 2.3* 2.7* 2.1* 2.1* 1.7*  --  1.6*   AST 38 39 57* 50* 56* 48*  --  41   ALT 29 31 40 31 24 23  --  22   LDH  --   --   --   --   --   --  169 191       Pancreatitis testing    Recent Labs   Lab Test 09/02/21  2031 06/29/21  0410 06/28/21  1839 05/18/21  1220 12/09/20  1504  10/06/20  0927   LIPASE 251  --  199 381 140 139   TRIG  --  Unsatisfactory specimen - icteric Canceled, Test credited  --   --   --        Hematology Studies      Recent Labs   Lab Test 09/07/21  0632 09/05/21  0637 09/04/21  2031 09/04/21  0409 09/03/21  0523 09/02/21  1730 08/31/21  0837 08/09/21  1312 08/02/21  1720 08/02/21  0603 06/29/21  0410 06/28/21  1839 05/18/21  1220 12/09/20  1504   WBC 11.2* 11.6*  --  9.8 15.7* 21.4* 9.6 15.1* 15.6* 15.7*  15.7*   < > 50.6* 13.2* 4.2   ANEU  --   --   --   --   --   --   --  11.6* 12.0* 11.9*  --  44.0* 12.0* 2.1   ALYM  --   --   --   --   --   --   --  0.6* 0.8 1.3  --  0.0* 0.6* 1.3   MEDHAT  --   --   --   --   --   --   --  1.8* 2.2* 2.2*  --  2.0* 0.4 0.6   AEOS  --   --   --   --   --   --   --  0.2 0.3 0.0  --  0.5 0.0 0.2   HGB 8.5* 8.6* 8.0* 6.8* 7.8* 9.3* 7.8* 8.3* 8.1* 8.1*  8.1*   < > 7.6* 16.6 17.2   HCT 26.4* 26.0*  --  20.8* 24.4* 29.2* 24.7* 24.8* 25.0* 24.8*  24.8*   < > 23.4* 44.8 47.7    262  --  257 316 386 336 290 125* 110*  110*   < > 127* 222 299    < > = values in this interval not displayed.       Arterial Blood Gas Testing    Recent Labs   Lab Test 09/02/21  1730 07/01/21  0014 06/30/21  1716 06/28/21  1839   O2PER 5 60.0 5L 2L        Urine Studies     Recent Labs   Lab Test 12/09/20  1817 10/06/20  1214   URINEPH 7.0 7.0   NITRITE Negative Negative   LEUKEST Negative Negative   WBCU 0 1       Vancomycin Levels     Recent Labs   Lab Test 09/06/21  0646 06/30/21  0700   VANCOMYCIN 25.2* 31.2*       Tobramycin levels     No lab results found.    Gentamicin levels    No lab results found.    Tacrolimus levels    Invalid input(s): TACROLIMUS, TAC, TACR  Transplant Immunosuppression Labs Latest Ref Rng & Units 9/7/2021 9/5/2021 9/4/2021 9/3/2021 9/2/2021   Creat 0.66 - 1.25 mg/dL 2.76(H) 3.56(H) 2.78(H) 4.90(H) 4.70(H)   BUN 7 - 30 mg/dL 35(H) 47(H) 32(H) 74(H) 68(H)   WBC 4.0 - 11.0 10e3/uL 11.2(H) 11.6(H) 9.8 15.7(H) 21.4(H)    Neutrophil % - - - - -   ANEU 1.6 - 8.3 10e3/uL - - - - -       Cyclosporine levels    Invalid input(s): CYCLOSPORINE, CYC    Mycophenolate levels    Invalid input(s): MYPA, MYP    Sirolimus levels    Invalid input(s): SIROLIMUS, SIR, RAPA    CSF testing   No lab results found.         Imaging:   XR Chest Port 1 View    Result Date: 9/2/2021  EXAM: XR CHEST PORT 1 VIEW  9/2/2021 5:24 PM HISTORY:  low O2 SATS   COMPARISON:  8/14/2021 FINDINGS: Portable supine radiograph of the chest. Right IJ CVC and left upper extremity PICC tip project over the low SVC. The trachea is midline. The cardiac silhouette is partially obscured. Large right pleural effusion with near total atelectasis of the right lung. No pneumothorax. The left lung is clear. The upper abdomen is unremarkable. Osseous structures are unchanged.     IMPRESSION: 1. Large right pleural effusion with near total right lung atelectasis. 2. The left lung is clear. I have personally reviewed the examination and initial interpretation and I agree with the findings. BRIAN SANCHEZ MD   SYSTEM ID:  [id number]    XR Abdomen Port 1 View    Result Date: 9/2/2021  EXAMINATION:  XR ABDOMEN PORT 1 VIEWS 9/2/2021 7:11 PM INDICATION: Elevated LA and increase in nausea. Please eval for acute patho COMPARISON: CT abdomen pelvis 8/23/2021. FINDINGS: Single AP supine view of the abdomen. The small intestine and colon are nondistended. Cystogastrostomy tube over the left upper quadrant. Gastrojejunostomy tube tip projected in the left lower quadrant. Ascites. No evidence of pneumatosis or portal venous gas. Limited evaluation of intra-abdominal free air due to supine position. Right-sided pleural effusion.     IMPRESSION: 1. Nonobstructive bowel gas pattern. Stable position of cystogastrostomy and gastrojejunostomy tube. 2. Moderate right-sided pleural effusion. I have personally reviewed the examination and initial interpretation and I agree with the findings. BRIAN SANCHEZ MD    SYSTEM ID:  [id number]    CT Abdomen Pelvis w Contrast    Result Date: 9/2/2021  EXAMINATION: CT ABDOMEN PELVIS W CONTRAST  9/2/2021 8:19 PM  HISTORY: Abdominal abscess/infection suspected COMPARISON: CT abdomen and pelvis 8/23/2021 and 8/21/2021 PROCEDURE COMMENTS: CT of the abdomen and pelvis was performed with iopamidol (ISOVUE-370) solution 84 mL intravenous contrast. Coronal and sagittal reformatted images were obtained. FINDINGS: Stable percutaneous gastrojejunostomy tube with tip in the proximal jejunum. Interval removal of left lateral approach abdominal drain. Unchanged cystogastrostomy tube. Lower thorax: Slight decrease in large right pleural effusion with associated compressive atelectasis. Stable 10 mm subpleural pulmonary nodule in the lingula. The left lung is clear. Abdomen and pelvis: Unchanged hepatosplenomegaly. Decreased hypoattenuating lesion in segment 2, which measures 2.1 x 1.5 cm, previously measuring 2.1 x 2.0 cm. Scattered subcentimeter cystic hypodensities within the liver, likely representing simple hepatic cysts. No new focal liver lesions. Gallbladder is unremarkable. Interval decrease in 6.8 x 0.8 cm peripancreatic fluid collection with cyst gastrostomy tube in place and interval removal of left lateral approach abdominal drain, previously measuring 8.6 x 2.0 cm. Slight decrease in fluid collection adjacent to the pancreatic head measuring 2.8 x 1.6 cm, previously measuring 3.1 x 2.0 cm. Decrease fluid collection along the inferior aspect of the pancreas measuring 3.4 x 2.2 cm, previously measuring 4.1 x 2.7 cm. The adrenal glands are unremarkable. Edematous and poorly enhancing bilateral kidneys are similar to appearance from prior exam. No nephrolithiasis. Urinary bladder is decompressed. Decreased loculated ascites with persistence peritoneal enhancement and thickening which is seen within the pelvis and tracks up into the right pericolic gutter. Largest pocket in the pelvis  measuring 6.3 x 5.8 cm, previously measuring 9.6 x 8.1 cm. No abnormally dilated loops of large and small bowel. No evidence of bowel obstruction. No new fluid collection. No inguinal lymphadenopathy. Redemonstration of multiple prominent borderline enlarged mesenteric lymph nodes, likely reactive in nature. The major intra-abdominal vasculature is widely patent. Multiple perisplenic varicosities seen in the left upper abdomen. Mild anasarca. Bones: No suspicious or aggressive appearing bone lesions.     IMPRESSION: 1. Sequela of necrotizing pancreatitis with interval decrease in multiple peripancreatic fluid collections with interval removal of left lateral approach large-bore drain and stable positioning of cystogastrostomy tube. No new intra-abdominal fluid collections. 2. Decreased loculated ascites. 3. Decreased large right pleural effusion with associated compressive atelectasis. 4. Stable appearance of edematous and poorly enhancing kidneys. 5. Slight decrease in hypoattenuating lesion within segment 2 of the liver. I have personally reviewed the examination and initial interpretation and I agree with the findings. BRIAN SANCHEZ MD   SYSTEM ID:  [id number]

## 2021-09-07 NOTE — PROVIDER NOTIFICATION
D/I: Pt is alert and oriented x4. Tachycardic and dyspneic on 3L oxygen via NC over night. Right Chest dialysis access intact. PIV placed for CT contrast this morning. GJ tube patent with J--port running TF @ 55 mL/hr with solution + enzymes/NaBicarb Q4h. G-port to gravity with large output. Scheduled oxycodone given for pain and Dilaudid oral PRN. Skin is jaundiced and fragile. Pt repositions independently in bed.   P: Will continue to monitor pt and follow POC. Pending Abd CT this morning

## 2021-09-07 NOTE — PLAN OF CARE
"Blood pressure 102/51, pulse 108, temperature 97.8  F (36.6  C), temperature source Oral, resp. rate 17, height 1.676 m (5' 6\"), weight 58.9 kg (129 lb 13.6 oz), SpO2 94 %. Via 4L NC.    Patient A & O X 4 VSS no respiratory distress noted , denies Nausea . C/o back pain and received scheduled oxycodone and Dilaudid po x 2 PRN. Post assessment with relief. Pt more awake today and in good mood.. Gastrostomy open to gravity and J-tube continue to tube feeding @ 55 ml/hr. PEG tube dressing changed x 1 .  Patient order food from outside and ate some . Had abdominal CT done this morning . Patient repositioned q2hr and as tolerated.  Assist to bedpan and bowel movement x 1. Coccyx reddened, applied barrier cream. Call light within reach and hourly round completed . Continue monitor closely and update MD with change.      "

## 2021-09-08 LAB
BACTERIA BLD CULT: NO GROWTH
BACTERIA BLD CULT: NO GROWTH
BACTERIA PLR CULT: NO GROWTH
GRAM STAIN RESULT: NORMAL
GRAM STAIN RESULT: NORMAL
LACTATE SERPL-SCNC: 2.4 MMOL/L (ref 0.7–2)
MAGNESIUM SERPL-MCNC: 2.1 MG/DL (ref 1.6–2.3)
VANCOMYCIN SERPL-MCNC: 27.1 MG/L

## 2021-09-08 PROCEDURE — 250N000013 HC RX MED GY IP 250 OP 250 PS 637: Performed by: INTERNAL MEDICINE

## 2021-09-08 PROCEDURE — 99233 SBSQ HOSP IP/OBS HIGH 50: CPT | Mod: GC | Performed by: STUDENT IN AN ORGANIZED HEALTH CARE EDUCATION/TRAINING PROGRAM

## 2021-09-08 PROCEDURE — 36592 COLLECT BLOOD FROM PICC: CPT | Performed by: STUDENT IN AN ORGANIZED HEALTH CARE EDUCATION/TRAINING PROGRAM

## 2021-09-08 PROCEDURE — 258N000003 HC RX IP 258 OP 636: Performed by: CLINICAL NURSE SPECIALIST

## 2021-09-08 PROCEDURE — 250N000013 HC RX MED GY IP 250 OP 250 PS 637: Performed by: STUDENT IN AN ORGANIZED HEALTH CARE EDUCATION/TRAINING PROGRAM

## 2021-09-08 PROCEDURE — 634N000001 HC RX 634: Performed by: CLINICAL NURSE SPECIALIST

## 2021-09-08 PROCEDURE — 36592 COLLECT BLOOD FROM PICC: CPT | Performed by: INTERNAL MEDICINE

## 2021-09-08 PROCEDURE — 83735 ASSAY OF MAGNESIUM: CPT | Performed by: INTERNAL MEDICINE

## 2021-09-08 PROCEDURE — C9113 INJ PANTOPRAZOLE SODIUM, VIA: HCPCS | Performed by: INTERNAL MEDICINE

## 2021-09-08 PROCEDURE — 99233 SBSQ HOSP IP/OBS HIGH 50: CPT | Performed by: CLINICAL NURSE SPECIALIST

## 2021-09-08 PROCEDURE — 99232 SBSQ HOSP IP/OBS MODERATE 35: CPT | Performed by: PHYSICIAN ASSISTANT

## 2021-09-08 PROCEDURE — 90937 HEMODIALYSIS REPEATED EVAL: CPT

## 2021-09-08 PROCEDURE — 80202 ASSAY OF VANCOMYCIN: CPT | Performed by: PEDIATRICS

## 2021-09-08 PROCEDURE — 250N000011 HC RX IP 250 OP 636: Performed by: STUDENT IN AN ORGANIZED HEALTH CARE EDUCATION/TRAINING PROGRAM

## 2021-09-08 PROCEDURE — 250N000011 HC RX IP 250 OP 636: Performed by: INTERNAL MEDICINE

## 2021-09-08 PROCEDURE — 99232 SBSQ HOSP IP/OBS MODERATE 35: CPT | Mod: GC | Performed by: STUDENT IN AN ORGANIZED HEALTH CARE EDUCATION/TRAINING PROGRAM

## 2021-09-08 PROCEDURE — 120N000002 HC R&B MED SURG/OB UMMC

## 2021-09-08 PROCEDURE — 83605 ASSAY OF LACTIC ACID: CPT | Performed by: STUDENT IN AN ORGANIZED HEALTH CARE EDUCATION/TRAINING PROGRAM

## 2021-09-08 PROCEDURE — G0463 HOSPITAL OUTPT CLINIC VISIT: HCPCS

## 2021-09-08 RX ADMIN — HYDROXYZINE HYDROCHLORIDE 50 MG: 25 TABLET, FILM COATED ORAL at 21:02

## 2021-09-08 RX ADMIN — ONDANSETRON 4 MG: 2 INJECTION INTRAMUSCULAR; INTRAVENOUS at 20:23

## 2021-09-08 RX ADMIN — METRONIDAZOLE 500 MG: 500 INJECTION, SOLUTION INTRAVENOUS at 20:09

## 2021-09-08 RX ADMIN — PANCRELIPASE 2 CAPSULE: 24000; 76000; 120000 CAPSULE, DELAYED RELEASE PELLETS ORAL at 09:07

## 2021-09-08 RX ADMIN — PANCRELIPASE 2 CAPSULE: 24000; 76000; 120000 CAPSULE, DELAYED RELEASE PELLETS ORAL at 20:03

## 2021-09-08 RX ADMIN — Medication 1 PACKET: at 20:20

## 2021-09-08 RX ADMIN — SODIUM CHLORIDE 300 ML: 9 INJECTION, SOLUTION INTRAVENOUS at 13:35

## 2021-09-08 RX ADMIN — PANCRELIPASE 2 CAPSULE: 24000; 76000; 120000 CAPSULE, DELAYED RELEASE PELLETS ORAL at 04:45

## 2021-09-08 RX ADMIN — BENZONATATE 100 MG: 100 CAPSULE ORAL at 20:23

## 2021-09-08 RX ADMIN — CEFEPIME HYDROCHLORIDE 1 G: 1 INJECTION, POWDER, FOR SOLUTION INTRAMUSCULAR; INTRAVENOUS at 21:55

## 2021-09-08 RX ADMIN — PANTOPRAZOLE SODIUM 40 MG: 40 INJECTION, POWDER, FOR SOLUTION INTRAVENOUS at 09:09

## 2021-09-08 RX ADMIN — SODIUM BICARBONATE 325 MG: 325 TABLET ORAL at 00:46

## 2021-09-08 RX ADMIN — PANCRELIPASE 2 CAPSULE: 24000; 76000; 120000 CAPSULE, DELAYED RELEASE PELLETS ORAL at 17:37

## 2021-09-08 RX ADMIN — SODIUM BICARBONATE 325 MG: 325 TABLET ORAL at 04:45

## 2021-09-08 RX ADMIN — OXYCODONE HYDROCHLORIDE 5 MG: 5 SOLUTION ORAL at 00:31

## 2021-09-08 RX ADMIN — PANCRELIPASE 2 CAPSULE: 24000; 76000; 120000 CAPSULE, DELAYED RELEASE PELLETS ORAL at 00:45

## 2021-09-08 RX ADMIN — FOLIC ACID 1 MG: 1 TABLET ORAL at 09:08

## 2021-09-08 RX ADMIN — Medication 5 ML: at 09:08

## 2021-09-08 RX ADMIN — OXYCODONE HYDROCHLORIDE 5 MG: 5 SOLUTION ORAL at 17:36

## 2021-09-08 RX ADMIN — SODIUM BICARBONATE 325 MG: 325 TABLET ORAL at 09:09

## 2021-09-08 RX ADMIN — SODIUM BICARBONATE 325 MG: 325 TABLET ORAL at 20:03

## 2021-09-08 RX ADMIN — THIAMINE HCL TAB 100 MG 100 MG: 100 TAB at 09:08

## 2021-09-08 RX ADMIN — HYDROMORPHONE HYDROCHLORIDE 1 MG: 1 SOLUTION ORAL at 03:08

## 2021-09-08 RX ADMIN — SODIUM CHLORIDE 250 ML: 9 INJECTION, SOLUTION INTRAVENOUS at 13:36

## 2021-09-08 RX ADMIN — OXYCODONE HYDROCHLORIDE 5 MG: 5 SOLUTION ORAL at 09:08

## 2021-09-08 RX ADMIN — HYDROXYZINE HYDROCHLORIDE 50 MG: 25 TABLET, FILM COATED ORAL at 00:29

## 2021-09-08 RX ADMIN — MELATONIN TAB 3 MG 6 MG: 3 TAB at 00:21

## 2021-09-08 RX ADMIN — SODIUM BICARBONATE 325 MG: 325 TABLET ORAL at 17:36

## 2021-09-08 RX ADMIN — HYDROMORPHONE HYDROCHLORIDE 1 MG: 1 SOLUTION ORAL at 13:14

## 2021-09-08 RX ADMIN — HYDROMORPHONE HYDROCHLORIDE 1 MG: 1 SOLUTION ORAL at 20:23

## 2021-09-08 RX ADMIN — ACETAMINOPHEN 325 MG: 325 SOLUTION ORAL at 16:30

## 2021-09-08 RX ADMIN — Medication: at 13:35

## 2021-09-08 RX ADMIN — SEVELAMER CARBONATE 0.8 G: 800 POWDER, FOR SUSPENSION ORAL at 20:03

## 2021-09-08 RX ADMIN — EPOETIN ALFA-EPBX 4000 UNITS: 10000 INJECTION, SOLUTION INTRAVENOUS; SUBCUTANEOUS at 13:35

## 2021-09-08 RX ADMIN — METRONIDAZOLE 500 MG: 500 INJECTION, SOLUTION INTRAVENOUS at 09:09

## 2021-09-08 RX ADMIN — SEVELAMER CARBONATE 0.8 G: 800 POWDER, FOR SUSPENSION ORAL at 09:09

## 2021-09-08 ASSESSMENT — ACTIVITIES OF DAILY LIVING (ADL)
ADLS_ACUITY_SCORE: 19
ADLS_ACUITY_SCORE: 19
ADLS_ACUITY_SCORE: 17
ADLS_ACUITY_SCORE: 19
ADLS_ACUITY_SCORE: 19

## 2021-09-08 ASSESSMENT — MIFFLIN-ST. JEOR: SCORE: 1457.75

## 2021-09-08 NOTE — PROGRESS NOTES
/WOC Nurse Inpatient Wound Assessment   Reason for consultation: Evaluate and treat  Peg tube wounds    Assessment  Peg tube wounds due to Moisture Associated Skin Damage (MASD)  Status: healing, hypergranular tissue, noted increase drainage around the tube     9/3-Treated with silver nitrate stick  9/8- Treated with silver nitrate    Treatment Plan  Peg tube wounds: Daily  and PRN   Cleanse wound bed with NS and pat dry.  Paint gera tube area with No sting film barrier  Place fenestrated gauze under the tube. DO not add more dressing under the bumper, may add gauze on top of the bumper if needed.  Ensure to stabilize the tube      Orders Written  Recommended provider order: None, at this time  WOC Nurse follow-up plan:weekly  Nursing to notify the Provider(s) and re-consult the WOC Nurse if wound(s) deteriorates or new skin concern.    Patient History  According to provider note(s):  30 year old male with a history of heavy alcohol use who presented to OSH 5/19 for abdominal pain, nausea and vomiting, admitted for alcohol hepatitis, ESLD, RADHA on HD, septic shock requiring pressors as well as necrotizing pancreatitis with large evolving necrotic collection. Transferred to [facility] for consideration of drainage of presumed infected necrotic collection. S/p EUS-guided necrosectomy (7/21/21).    Objective Data  Containment of urine/stool: Continent of bladder and Continent of bowel    Active Diet Order  Orders Placed This Encounter      Regular Diet Adult      Output:   I/O last 3 completed shifts:  In: 2520 [P.O.:350; I.V.:300; NG/GT:600]  Out: 2380 [Urine:30; Emesis/NG output:2350]    Risk Assessment:   Sensory Perception: 4-->no impairment  Moisture: 3-->occasionally moist  Activity: 2-->chairfast  Mobility: 2-->very limited  Nutrition: 3-->adequate  Friction and Shear: 2-->potential problem  Tyrel Score: 16                          Labs:   Recent Labs   Lab 09/07/21  0632 09/04/21 2031 09/04/21 0414 09/03/21  0946    ALBUMIN  --   --  2.9*  --    HGB 8.5*  --   --   --    INR  --   --   --  1.81*   WBC 11.2*   < >  --   --    CRP 53.0*   < >  --   --     < > = values in this interval not displayed.       Physical Exam  Areas of skin assessed: focused peg tube site    Wound Location:  Peg tube       8/19 8/25      Date of last photo 8/19  Wound History:  7/13 - PEG-J placement with T-tag gastropexy    Wound Base: 100 % pale moist red tissue at 9 O'clock- hypergranular tissue is resolving with silver nitrate but increased erythema and drainage approximately 1x4cm      Palpation of the wound bed: normal      Drainage: small     Description of drainage: cloudy and tan     Measurements (length x width x depth, in cm) 0.3  x 0.5  x  0.0 cm (hypergran tissue)  Tunneling N/A     Undermining N/A  Periwound skin: erythema- blanchable and moist      Color: pink      Temperature: normal   Odor: none  Pain: moderate and with assessment,   Pain intervention prior to dressing change: NA    Interventions  Visual inspection and assessment completed   Wound Care Rationale Protect periwound skin, Promote moist wound healing without tissue dehydration  and Decrease bacterial load  Wound Care: done per plan of care- treated with silver nitrate and discontinued hydrofera blue  Supplies: floor stock and discussed with RN  Current off-loading measures: Pillows  Current support surface: Standard  Low air loss mattress  Education provided to: plan of care and wound progress  Discussed plan of care with Patient and Nurse    Deneen Matthews RN CWOCN

## 2021-09-08 NOTE — PROGRESS NOTES
Nephrology Progress Note  09/08/2021         Dax Villareal is a 30 year old male with h/o ETOH hepatitis, end stage liver disease, RADHA on HD, transferred from [facility] in [place] for septic shock on 6/28/21 for treatment of necrotizing pancreatitis and decompensated liver disease. He was initially admitted to [facility] 5/19/21 and started on RRT 5/26/2021.        Interval History :   Mr Villareal had necrosectomy for 4th time on 8/11 with no more anticipated.  Scheduled for HD today, stable on 3x/week HD although we skipped one day last week with relative hypotension, is not very compliant with HD diet.  Plan for 1-2L of UF on run today as BP's tolerate, on EPO and sevelamer with Phos WNL today.        Assessment & Recommendations:   ESRD-Cr was 0.8 as recently as 5/19/2021 but now HD dependent for 3 months, started RRT at [facility] prior to transfer to [facility] on 5/26.  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  continuing restorative cares for now, BP's have been stable enough to support HD.  Had GI stenting of pancreas x4.                 -Line is TDC from PTA               -HD today, ordered 1-2L of UF as BP's allow, overall is net even to negative on his own due to large drain output.       Volume status-Anuric, wt stable despite minimal UF with runs due to significant drain output.        Electrolytes/pH-K 3.7, bicarb 20.  Running HD today, Na 128, chronically runs low despite HD likely due to H2O/Soda intake.      Ca/phos/pth-Ca 9.6, Ca 8.6, Mg 1.9 and Phos 4.8.                   Anemia-Hgb low at 8.5, acute management per team, last PRBC's 7/14.  Started on EPO 5k with runs, iron sats 43%      Nutrition-Taking PO, appetite reasonable.        Recommendations were communicated to primary team via note       SOFIA Marlow CNS  Clinical Nurse Specialist  [phone]    Review of Systems:   I reviewed the following systems:  Gen:  "No fevers or chills  CV: No CP at rest  Resp: No SOB at rest  GI: No N/V    Physical Exam:   I/O last 3 completed shifts:  In: 2520 [P.O.:350; I.V.:300; NG/GT:600]  Out: 2380 [Urine:30; Emesis/NG output:2350]   BP 98/45 (BP Location: Right arm)   Pulse 111   Temp 98.5  F (36.9  C) (Oral)   Resp 16   Ht 1.676 m (5' 6\")   Wt 57 kg (125 lb 10.6 oz)   SpO2 92%   BMI 20.28 kg/m          GENERAL APPEARANCE: Lying in bed, clearly distressed and vomiting  EYES:  scleral icterus, pupils equal  Pulmonary: decreased BS   CV: tachy   - Edema - no LE edema  GI: mild distention, non-tender. Has pancreatic drain with large amounts of yellowish clear non purulent fluid  MS: no evidence of inflammation in joints, no muscle tenderness  SKIN: no rash, warm, dry, no cyanosis  NEURO: alert/interactive  ACCESS: Right TDC with no induration or erythema    Labs:   All labs reviewed by me  Electrolytes/Renal - Recent Labs   Lab Test 09/08/21  0710 09/07/21  0632 09/06/21  0646 09/05/21 0637 09/04/21 0414   NA  --  128*  --  127* 128*   POTASSIUM  --  3.7  --  3.8 3.1*   CHLORIDE  --  95  --  95 94   CO2  --  20  --  18* 22   BUN  --  35*  --  47* 32*   CR  --  2.76*  --  3.56* 2.78*   GLC  --  138*  --  123* 121*   JANENE  --  8.6  --  9.2 8.3*   MAG 2.1 1.9 2.2 2.1 2.0   PHOS  --  4.8*  --  6.4* 4.4       CBC -   Recent Labs   Lab Test 09/07/21  0632 09/05/21  0637 09/04/21 2031 09/04/21  0409   WBC 11.2* 11.6*  --  9.8   HGB 8.5* 8.6* 8.0* 6.8*    262  --  257       LFTs -   Recent Labs   Lab Test 09/04/21 0414 09/03/21  0523 09/02/21  1730   ALKPHOS 81 95 120   BILITOTAL 1.4* 2.0* 2.2*   ALT 29 31 40   AST 38 39 57*   PROTTOTAL 8.8 9.2* 10.7*   ALBUMIN 2.9* 2.3* 2.7*       Iron Panel -   Recent Labs   Lab Test 08/14/21 2055 07/19/21  0632   IRON 28* 31*   IRONSAT 21 43   JERRELL 2,186*  --            Current Medications:    sodium chloride 0.9%  250 mL Intravenous Once in dialysis/CRRT     sodium chloride 0.9%  300 mL " Hemodialysis Machine Once     sodium bicarbonate  325 mg Per Feeding Tube Q4H    And     amylase-lipase-protease  1-2 capsule Per Feeding Tube Q4H     amylase-lipase-protease  2 capsule Oral TID w/meals     B and C vitamin Complex with folic acid  5 mL Per Feeding Tube Daily     ceFEPIme (MAXIPIME) IV  1 g Intravenous Q24H     epoetin charles-epbx (RETACRIT) inj ESRD  4,000 Units Intravenous Once in dialysis/CRRT     fiber modular (NUTRISOURCE FIBER)  1 packet Per Feeding Tube TID     folic acid  1 mg Oral Daily     heparin lock flush  5-20 mL Intracatheter Q24H     melatonin  6 mg Oral At Bedtime     menthol   Transdermal Q8H     metroNIDAZOLE  500 mg Intravenous Q12H     - MEDICATION INSTRUCTIONS -   Does not apply Once     oxyCODONE  5 mg Oral Q8H     pantoprazole (PROTONIX) IV  40 mg Intravenous Daily with breakfast     protein modular  1 packet Per Feeding Tube Daily     sevelamer carbonate (RENVELA)  0.8 g Oral TID     sodium chloride (PF)  3 mL Intracatheter Q8H     sodium chloride (PF)  3 mL Intracatheter Q8H     sodium chloride (PF)  3 mL Intracatheter Q8H     sodium chloride (PF)  9 mL Intracatheter During Dialysis/CRRT (from stock)     sodium chloride (PF)  9 mL Intracatheter During Dialysis/CRRT (from stock)     vitamin B1  100 mg Oral Daily    Or     thiamine  100 mg Intravenous Daily     vancomycin place smith - receiving intermittent dosing  1 each Intravenous See Admin Instructions       - MEDICATION INSTRUCTIONS -       dextrose       heparin (porcine) 1,000 Units/hr (08/24/21 0907)

## 2021-09-08 NOTE — PLAN OF CARE
"Blood pressure 108/44, pulse 114, temperature 99.5  F (37.5  C), temperature source Oral, resp. rate 16, height 1.676 m (5' 6\"), weight 57 kg (125 lb 10.6 oz), SpO2 98 %. Via 4L NC.      Patient A & O X 4 , VSS no respiratory distress noted . Patient ordered outside food and  ate some after had green emesis undigested food, 450 ml. , given Zofran 4mg IV x 1 . Tube feeding increased to 60 ml/hr per order and tolerated well. Gastrostomy tube open to gravity No output during day shift. G tube  Flushed with 50 ml water  and output this shift 650 ml. Left triple lumen patent . Patient in good mood had hair washed , bed bath . Calm and cooperative with care. Call light within reach, and hourly round completed. Continue  monitor respiratory, GI and I/O update MD with change.        "

## 2021-09-08 NOTE — PROGRESS NOTES
[facility]    GASTROENTEROLOGY PROGRESS NOTE    ASSESSMENT/RECOMMENDATIONS:  30 year old male with a history of alcohol use disorder who presented to OSH 5/19 for abdominal pain, nausea and vomiting, and found to have alcohol hepatitis, ESLD, RADHA on HD, septic shock requiring pressors as well as necrotizing pancreatitis with large evolving necrotic collection. Transferred to [facility] for drainage of presumed infected necrotic collection. S/p EUS transluminal and percutaneous drainage.    #. Acute necrotizing pancreatitis with presumed infected walled off necrotic collection s/p percutaneous and endoscopic drainage   #. Septic shock, resolved  #. Leukocytosis, resolved  #. Ascites/Peritonitis  Unclear evolution of pancreatitis history but had a recent CT scan with large evolving necrotic collection, presumed infected. No obvious gas in collection. CT on admission to [facility] showing mature collection with enhancing wall measuring ~[id number]. With profound coagulopathy (liver disease + malnutrition +sepsis), INR 3.8 on transfer he was given FFP and Vit K, IR placed perc drain 6/29. Repeat imaging 7/18 with still large walled off necrotic collection that appeared amenable to endoscopic transluminal drainage, underwent cystgastrostomy stenting on 7/21/21, extensive debridement/necrosectomy 8/6, final near complete/suffiecient debridement with evacuation of solid necrosis in right side of cavity 8/11,two 10 x 1 cm Solus stents placed across cavity, no current plans for future necrosectomies and CT 9/7 with near resolution of any necrotic collections  Etiology: alcohol  Date of onset: 5/19  BISAP score on admission: unclear  Concurrent organ failure: respiratory (now extubated), renal (on HD), liver, cardiogenic (weaned off pressors 6/29)  Thromboses: none  Diabetes: no  Current nutrition access: PEG-J + po diet   Intervention:                 6/30 - Left IR RP perc drain (24F)                  7/2 - Diagnostic paracentesis (WBC 2663 - 74% PMN, SAAG <1.1, protein 4.1)                  7/5 - Repeat Paracentesis (WBC 1801 - 61% PMN, lipase 48)                 7/13 - Diag/therapeutic paracentesis ()                 7/13 - PEG-J placement with T-tag gastropexy                 7/21 - Repeat paracentesis                 7/21 - EUS-cystgastrostomy, Axios with co-axial Viabil                 7/28 - EGD, stent replacement (transluminal Axios with coaxial Solus), PEG-J removal (buried bumper) and replacement of new PEG-J with one new T-tag                 8/6 - EGD with necrosectomy and near complete necrosectomy                 8/11 - EGD with completion necrosectomy, no e/o bowel fistula     8/25 - IR perc drain removed    #. Severe malnutrition in the context of chronic illness  #. Gastric outlet obstruction s/p PEG-J placement  Patient with very poor oral intake over the course of his illness and was not meeting calorie requirements orally. S/p PEG-J with T-tag gastropexy (x3) 7/13. T tags removed    Updates 9/8:  Abx to be completed today    Recommendations:  -- Current abx: flagyl + cefepime + vancomycin; per ID   * Given CT scan 9/7 with improvement/near resolution of fluid collections - plan completion of abx course today  -- Continue jejunostomy feeding, with PERT  -- Diet by mouth as tolerated (and vent G tube as needed)  -- Strict alcohol abstinence indefinitely     Panc/bili GI team will sign off. Please call with status changes or questions.    GI Follow up: Clinic visit with Dr. Chacko on 9/30/21; scheduled     The patient was discussed and plan agreed upon with GI staff, Dr. Suman Norwood PA-C  Advanced Endoscopy/Pancreaticobiliary GI Service  [facility]  Pager *[phone]  Text Page  ______________________________________________________  S:   - 24 hour events and nursing notes reviewed  - No new complaints  - Reports that he plans to drink  "alcohol on his birthday/Halloween but otherwise no plans to drink again. Discussed importance of strict alcohol abstinence for the indefinite future    O:  Blood pressure 98/45, pulse 111, temperature 98.5  F (36.9  C), temperature source Oral, resp. rate 16, height 1.676 m (5' 6\"), weight 57 kg (125 lb 10.6 oz), SpO2 92 %.  Gen: Cachectic.   HEENT: Conjunctiva anicteric, poor dentition  Resp: non labored breathing on ambient air  Abd: no abdominal distention, non tender throughout. PEG-J in place, c/d/i (tube feeds infusing and bilious output in g gravity bag)  Skin: no jaundice  Ext: warm, well perfused. HD catheter in place, right upper chest.   Mental status/Psych: alert and oriented. No asterixis    LABS:  BMP  Recent Labs   Lab 09/07/21  0632 09/05/21  0637 09/04/21  0414 09/03/21  0523   * 127* 128* 123*   POTASSIUM 3.7 3.8 3.1* 3.4   CHLORIDE 95 95 94 87*   JANENE 8.6 9.2 8.3* 9.6   CO2 20 18* 22 19*   BUN 35* 47* 32* 74*   CR 2.76* 3.56* 2.78* 4.90*   * 123* 121* 146*     CBC  Recent Labs   Lab 09/07/21  0632 09/05/21  0637 09/04/21  0409 09/03/21  0523   WBC 11.2* 11.6* 9.8 15.7*   RBC 2.75* 2.82* 2.23* 2.61*   HGB 8.5* 8.6* 6.8* 7.8*   HCT 26.4* 26.0* 20.8* 24.4*   MCV 96 92 93 94   MCH 30.9 30.5 30.5 29.9   MCHC 32.2 33.1 32.7 32.0   RDW 16.5* 16.4* 16.8* 16.7*    262 257 316     LFTs  Recent Labs   Lab 09/04/21  0414 09/03/21  0523 09/02/21  1730   ALKPHOS 81 95 120   AST 38 39 57*   ALT 29 31 40   BILITOTAL 1.4* 2.0* 2.2*   PROTTOTAL 8.8 9.2* 10.7*   ALBUMIN 2.9* 2.3* 2.7*      IMAGING:  CT Abdomen Pelvis w/contrast 9/6/21  1.  Evolving sequelae of necrotizing pancreatitis with cystgastrostomy  tube in unchanged position. Peripancreatic and walled off peritoneal  collections have overall decreased in size.  2.  Gastrostomy tube balloon appears to be partially within the  anterior abdominal wall fascia, possibly external to the stomach.  Recommended IR consult for further " evaluation.  3.  Unchanged renal edema/hypoenhancement.  4.  Unchanged large right pleural effusion.

## 2021-09-08 NOTE — PLAN OF CARE
RN assumed cares at 0700, Pt alert and oriented though forgetful, VS stable with some baseline hypotension and tachycardia throughout.  Pt reports diffuse pain in the abdomen relieved with hydromorphone and oxycodone solutions.  PEG tube patent with G tube to gravity and draining a moderate amount.  Tube feeds through J at goal rate of 60 with bicarb and creon Q4H.  Pt appetite poor, picking at things throughout the shift, never eating a whole meal.  Pt continues to require lift assistance for transfer.  Triple lumen PICC caps changed this shift.  Plan to discharge to TCU later this week once medically stable and facility established.  Pt dialyzed today without incident.  No acute incidents this shift.  Continue to monitor and notify MD of any changes.

## 2021-09-08 NOTE — PROGRESS NOTES
Sepsis Evaluation Progress Note    I was called to see Dax Villareal due to abnormal vital signs triggering the Sepsis SIRS screening alert. He is known to have an infection.     Physical Exam   Vital Signs:  Temp: (!) 96.6  F (35.9  C) Temp src: Oral BP: 92/41 Pulse: 117   Resp: 26 SpO2: 93 % O2 Device: None (Room air) Oxygen Delivery: 4 LPM     General: in no acute distress  Mental Status: AAOx4.   Cardiac: tachycardic, regular rhythm  Lungs: breathing comfortably on room air, no adventitious sounds to bilateral auscultation      Data   Lactic Acid   Date Value Ref Range Status   09/08/2021 2.4 (H) 0.7 - 2.0 mmol/L Final   09/04/2021 1.8 0.7 - 2.0 mmol/L Final   07/10/2021 1.4 0.7 - 2.0 mmol/L Final   07/09/2021 2.8 (H) 0.7 - 2.0 mmol/L Final     Lactate for Sepsis Protocol   Date Value Ref Range Status   07/08/2021 1.1 0.7 - 2.0 mmol/L Final   07/07/2021 0.9 0.7 - 2.0 mmol/L Final       Assessment & Plan   Dax Villareal meets SIRS criteria but does NOT have a lactate >2 or other evidence of acute organ damage.  These vital sign, lab and physical exam findings constitute a diagnosis of SEPSIS.    30 year old male admitted to [place] [address] service with necrotizing pancreatitis with large evolving necrotic collection causing the patient to be on pressor support. He underwent EUS transluminal with stenting and percutaneous drainage and weaned off pressor support . He was treated with Flagyl, Cefepime and Vancomycin with ID folllowing. Earlier today, Mr. Villareal had dialysis and his vital signs while at dialysis triggered the sepsis protocol.  Lactate returned at 2.4. RRT was called to the bedside. Upon arrival, Mr. Villareal was resting comfortably in his bed with no complaints. His bedside RN was present and also had no complaints. He is well cared for on the [place] [address] service. His mildly elevated lactic acid after just returning from dialysis would be expected.  No further work up required at this time.     Sepsis Time-Zero  (time Sepsis diagnosis confirmed): 17:50  09/08/21    Anti-infectives (From now, onward)    Start     Dose/Rate Route Frequency Ordered Stop    09/02/21 2000  metroNIDAZOLE (FLAGYL) infusion 500 mg      500 mg  over 60 Minutes Intravenous EVERY 12 HOURS 09/02/21 1845 09/08/21 2359 09/02/21 1900  ceFEPIme (MAXIPIME) 1g vial to attach to  ml bag for ADULTS or NS 50 ml bag for PEDS      1 g  over 30 Minutes Intravenous EVERY 24 HOURS 09/02/21 1845 09/08/21 2359        Current antibiotic coverage is appropriate for source of infection.     Disposition: The patient will remain on the current unit. We will continue to monitor this patient closely..  VENUS Sosa    Sepsis Criteria   Sepsis: 2+ SIRS criteria due to infection  Severe Sepsis: Sepsis AND 1+ new sign of acute organ dysfunction (Note: lactate >2 or acute encephalopathy each qualify as organ dysfunction)  Septic Shock: Sepsis AND hypotension despite volume resuscitation with 30 ml/kg crystalloid or lactate >=4  Note: HYPOTENSION is defined as 2 BP readings measured 3 hrs apart that have a SBP <90, MAP <65, or decrease >40 mmHg, occurring 6 hrs before or after t-zero

## 2021-09-08 NOTE — PHARMACY-VANCOMYCIN DOSING SERVICE
Drug Level Evaluation  Patient was receiving Vancomycin .  A level was drawn on 9/8/21 in the AM. The measured level result is 27.1 mg/L  This medication level is invalid because the medication was discontinued. No further assessment can be made at this time.    Pharmacy team will continue to follow.    Girish James PharmD, [facility]    [phone]  Pager [phone]

## 2021-09-08 NOTE — PROGRESS NOTES
[facility]    Medicine Progress Note - Hospitalist Service, Daniela 3       Date of Admission:  6/28/2021    Assessment & Plan    Dax Villareal is a 30 year old male with hx of alcohol use disorder admitted on 6/28/2021 after recent prolonged admission at [facility] in [place] who has severe acute alcoholic hepatitis c/b HE, ESRD requiring HD, and malnutrition in the setting of acute necrotizing pancreatitis, s/p PEG-J placement on 7/13. He arrived with acute respiratory failure and septic shock that have resolved, and his secondary bacterial peritonitis has been appropriately treated. He requires continued management for ESRD, hepatic encephalopathy, infected pancreatic pseudocyst, nutritional support via PEG-J, and persistent right pleural effusion. Clinically improving after multiple necrosectomies. Increasing leukocytosis and hypotension on 9/2 prompting resumption of IV antibiotics - has been stable since that time. Repeat CT with interval improvement in fluid collections.     Today:   - Continue cefepime, flagyl, and vanc EOT today for 7d course   -  continues to work with Dax and insurance for dispo plan  - HD today     #Septic Shock, resolved  #Necrotizing alcoholic pancreatitis w/ infected pseudocyst, improving   #Hepatic Fluid collection, stable to improving  #Secondary Bacterial Peritonitis 2/2 infected pseudocyst with VRE, improving  Admitted to the [facility] ICU on 6/28/2021 from [facility] in [place] for septic shock due to necrotizing pancreatitis. CT on admit showing mature collection with enhancing wall measuring ~[id number]. IR placed perc drain 6/29. Repeat imaging 7/18 revealed walled off necrotic collection amenable to endoscopic transluminal drainage with cystgastrostomy stenting with necrosectomy on 7/21/21. Complete necrosectomy on 8/11. Antibiotics (cefepime and metronidazole) were discontinued on 8/25 after left flank drain removed,  re-initiated in the setting of fever on 9/2. Favor abdominal etiology, repeat CT on 9/7 with interval improvement in fluid collections - will complete 7d course of abx per ID with EOT 9/8.   - follow BCx (9/2): NGTD  - Continue cefepime, flagyl, and vanc EOT 9/8     #Gastric Outlet Obstruction  High G tube output due to the cystogastric connection/fluid drainage noted on 8/5. Per GI, possibly related to gastric outlet from severe abdominal inflammation and is expected. KUB negative for obstructive gas pattern. Continues to have moderate NG output - stable to improved.       #Hyponatremia, stable  Patient with down-trending sodium the week of 8/27; likely due to substantial NG output and low solute intake and hepatic dysfunction. Stable     #Right Pancreatic Hydrothorax, stable  Increased dyspnea with CXR on 7/19 with further imaging and thoracentesis confirming a hepatic/pancreatic hydrothorax. Thoracentesis PRN for dyspnea, most recently 9/3.     #Alcoholic Hepatitis  #Coagulation Defect  Received 1 week of steroids at [facility] for elevated INR and altered mental status. Not a liver transplant candidate. Mental status improved with aggressive lactulose and rifaxamin, which was discontinued without return of AMS.     ESRD due to ATN  Hyperphosphatemia  Stage III RADHA due to ATN from septic shock without recovery for >3 months. Line is TDC from [facility]. Nephrology dialyzed on Tu, Th, Sa schedule.  - Will need follow-up and outpatient dialysis  - Sevelemer powder TID with TF for hyperphosphatemia      #Anemia due to Chronic Illness  #Hematochezia, resolved  #Hemoperitoneum  Baseline hgb 17. Running between 6-8 in setting of chronic illness, new ESRD with epocrit per Nephrology, frequent lab draws and procedures. Hematochezia on 8/8 after starting standard intensity heparin, now resolved. Paracentesis on 8/21 with hemoperitoneum. No active bleeding. Required a unit of PRBC on 9/4 in the setting of several fluid boluses.    - Goal hgb > 7.0, trend Hgb     #Alcohol Use Disorder  Counseled patient on complete alcohol cessation. Not interested in additional services at this time. Continue daily folic acid and thiamine.     #Lactic Acidosis  Intermittent Lactic acidosis in the setting of sepsis, ESRD, bleeding.     #Hypokalemia  Noted in the setting of high gastric output and improved once G-tube output decreased. CTM.      #Severe Malnutrition  Poor oral intake in the setting of necrotizing pancreatitis. PEG tube placed 7/13. Dietitian consulted and continuous tube feeds with goal of 65 ml/hr with continuous pancreatic enzymes  - Continuous tube feeds 50 mL overnight.       Diet: Regular Diet Adult  Adult Formula Drip Feeding: Continuous Vital 1.5; Jejunostomy; Goal Rate: 60; mL/hr; Medication - Feeding Tube Flush Frequency: At least 15-30 mL water before and after medication administration and with tube clogging; Amount to Send (Nutrition us...    DVT Prophylaxis: Pneumatic Compression Devices  Rdz Catheter: Not present  Central Lines: PRESENT  PICC Triple Lumen 06/28/21 Left-Site Assessment: WDL  CVC Double Lumen Right-Site Assessment: WDL    Code Status: Full Code      Disposition Plan   Expected discharge: 09/10/2021   recommended to transitional care unit vs back to [facility] once safe disposition plan/ TCU bed available.      The patient's care was discussed with patient and his sister was called.     Patient seen by and staffed with Dr. Gresham.     Anali Jackson MD   Med/Peds, PGY-2  Hospitalist Service, [address]  Securely message with the Vocera Web Console (learn more here)  Text page via [employer] Paging/Directory  Please see sign in/sign out for up to date coverage information  _____________________________________________________________________    Interval History   Patient with minimal abdominal discomfort this morning, otherwise feeling well. Was  able to order food in yesterday and had moderate appetite. No complaints today, will go to dialysis later this afternoon. SW continues to work with patient and insurance regarding dispo plan.     The remainder of a 4 point ROS is negative unless otherwise noted above.    Data reviewed today: I reviewed all medications, new labs and imaging results over the last 24 hours.    Physical Exam   Vital Signs: Temp: 98.5  F (36.9  C) Temp src: Oral BP: 98/45 Pulse: 111   Resp: 16 SpO2: 92 % O2 Device: None (Room air) Oxygen Delivery: 4 LPM  Weight: 125 lbs 10.6 oz  Gen: Awake, lying in bed, comfortable, good mood  HEENT: NC/AT, sclera anicteric  CV: Regular rhythm, tachycardic   Resp: Non-labored breathing on room air, diminished breath sounds in entirety of right lung  Abd: soft, mild tenderness to palpation in LLQ, PEG site c/d/i   Extrem: Warm and well perfused with no edema. Dry skin

## 2021-09-08 NOTE — PROVIDER NOTIFICATION
MD notified of Lactate result at 2.4.  Code sepsis RRT called and patient evaluated.  No action needed at this time.  Will continue to monitor and notify MD of any changes.

## 2021-09-08 NOTE — PROGRESS NOTES
HEMODIALYSIS TREATMENT NOTE    Date: 9/8/2021  Time: 3:07 PM    Data:  Pre Wt: 57 kg (125 lb 10.6 oz)   Desired Wt: 55 kg   Post Wt: 56 kg (123 lb 7.3 oz)  Weight change: 1 kg  Ultrafiltration - Post Run Net Total Removed (mL): 1000 mL  Vascular Access Status: CVC  patent  Dialyzer Rinse: Clear  Total Blood Volume Processed: 31.39 L   Total Dialysis (Treatment) Time: 3   Dialysate Bath: K 3, Ca 2.25  Heparin: None    Lab:   No    Interventions:  Pt had a stable uncomplicated 3 hours of HD. 1L of fluid was pulled. 450mL greenish gastric content emptied. Epogen administered as ordered. At 1630,PT C/O pain, got prn tylenol per his request. Ending BP was 110/73 . Pt rinsed back post tx,lumen were saline locked, end caps changed, and hand off report given to ROB Santacruz.    Assessment:  -Calm & Cooperative  -Pt remain on 4L 02 via NC per his request for comfort  -VSS  -A & O X 4     Plan:    Per renal

## 2021-09-08 NOTE — PROGRESS NOTES
[facility] Team Progress Note      Patient:  Dax Villareal, Date of birth 1990, Medical record number [id number]  Date of Visit:  September 3, 2021         Assessment and Recommendations:   Problem List:  1. Leukocytosis - resolved  2. Known hypotension, tachycardia   3. Fever - resolved  4. Right pleural effusion, known - s/p thoracentesis   5. Necrotizing alcoholic pancreatitis with infected pseudocyst, s/p cystogastrostomy with necrosectomies and s/p flank drain removal (08/25)  6. Septate ascites    Recommendations:  - CT results show interval improvement/stablility of fluid collections and the patient has clinically improved. Discontinue antibiotics after final doses today: cefepime, metronidazole, and vancomycin.  - Follow blood cultures and pleural fluid culture; new blood culture along with urine culture if fever or WBC spike.  - Infectious Disease will continue to follow for the next 1-2 days to confirm stability. Please obtain CBC and CRP tomorrow morning.     Discussion:  Suspect that the leukocytosis is secondary to probable bacterial growth in abdominal fluid pocket, possibly peripancreal or ascitic septa. Despite finishing long course of cefepime and metronidazole (08/06 - 08/25) with multiple other antibiotics prior to then, it is possible that the fluid collections were not sterile and that there is growth since discontinuing antibiotics. Paracentesis would likely be unhelpful for culture because antibiotics were initiated, and the ascites are septate so a sample from one area may not reflect a sample from another septa. SBP is of concern, however, the patient does not have significant or worsening abdominal pain and he feels well. Low concern for pulmonary cause. Pleural fluid with no growth to date. CT showed interval stability/improvement of fluid collections and the patient has clinically improved, so cessation of antibiotics is recommended. Intermittent fevers and WBC elevation is not  unexpected in necrotizing pancreatitis patients, so it is possible that the patient may require antibiotic therapy for this issue again in the future.    Ailyn Soto DDS  OMFS PGY-1        Interval History:   The patient reports again that he's feeling well today, in good spirits. No new or worsening pain. No nausea beyond his baseline. No pain with urination. No rashes or skin changes. He denies SOB. No chest pain. CT results demonstrate interval improvement of abdominal fluid collections, stability of pleural effusion.        HPI:   Adopted from consult note and updated:     Dax Villareal is a 30 year old male with PMHx significant for alcohol use disorder who was admitted to [facility] in [place], [place] for abdominal pain, nausea and vomiting, found to have end stage liver disease c/b hepatic encephalopathy, acute renal failure requiring iHD, SBP and acute necrotizing pancreatitis.      He required respiratory support (BIPAP, ? Intubation) for respiratory failure and appeared to be in sepsis (lactate reported at 7.7 with abnormal LFTs). He was transferred to [facility] for prolonged admission and reportedly treated with steroids; kidney function and pancreatic enzymes normal on admission). Length of stay stated >40 days. CT imaging 6/19 with concern for necrotizing pancreatitis with follow up CT scan 6/25 with peripancreatic fluid collection and 9 cm soft tissue/hemorrhagic structure c/f internal hemorrhage of a pseudocyst. Given multiple antibiotics (Cefepime, Flagyl, Ciprofloxacin, micafungin). Hospital course reportedly complicated by hypoxemic respiratory failure requiring BIPAP, ARF requiring iHD, hepatic encephalopathy, and septic shock 2/2 necrotizing pancreatitis/pancreatic pseudocyst requiring pressor support. Ultimately transferred to [facility] for interventions 6/28. Admitted to the ICU.      Now status post perc IR drain placement 6/29 with cultures that remain NGTD/negative. No endoscopic  drainage by GI was pursued given improvement in imaging 7/11. Additionally has had 3 paracenteses (7/2, 7/5, and 7/13 with negative culture work up. Had PEGJ placed 7/13.    His left flank drain was removed and his antibiotics were discontinued on 08/25.              Review of Systems:     Full 9 pt ROS obtained and negative unless noted above in assessment and interval history.          Current Antimicrobials     Current:   - Cefepime (8/6 - 8/25) (9/2 - 9/8)  - Metronidazole (8/6 - 8/25) (9/2 - 9/8)  - Vancomcyin (6/29) (9/4 - 9/8)    Prior:   - Linezolid 7/21-7/30  - Meropenem 6/29-7/18  - Micafungin 6/29-7/1  - Cipro 7/19-7/21  - Ceftriaxone 7/21-7/24; 7/26-7/27  - Zosyn 7/27-8/6          Physical Exam:   Ranges for vital signs:  Temp:  [97.6  F (36.4  C)-99.5  F (37.5  C)] 98.5  F (36.9  C)  Pulse:  [108-114] 111  Resp:  [16-18] 16  BP: ()/(44-52) 98/45  SpO2:  [92 %-98 %] 92 %    Intake/Output Summary (Last 24 hours) at 9/3/2021 1336  Last data filed at 9/3/2021 1200  Gross per 24 hour   Intake 545 ml   Output 925 ml   Net -380 ml     Exam:  GENERAL:  Met patient in dialysis, resting comfortably in bed, alert and cooperative.   ENT:  Head is normocephalic, atraumatic. Oropharynx is moist without exudates or ulcers.  EYES:  Eyes have anicteric sclerae.    NECK:  Supple.  LUNGS:  Decreased lung sounds on right side, left side is CTA.  CARDIOVASCULAR:  Tachycardic rate and regular rhythm with no murmurs, gallops or rubs. Brisk radial pulse felt.  ABDOMEN:  Normal bowel sounds, soft, nontender. G-tube site without erythema or pain.  EXT: Extremities warm and without edema.  SKIN:  No acute rashes.  Lines are in place without any surrounding erythema or tenderness.  NEUROLOGIC:  Grossly nonfocal.         Laboratory Data:   Reviewed.  Pertinent for:    Laboratory Data:   No results found for: ACD4    Microbiology:  Culture   Date Value Ref Range Status   09/03/2021 No growth after 4 days  Preliminary    09/03/2021 No growth after 4 days  Preliminary   09/03/2021 No growth after 4 days  Preliminary   09/03/2021 No Growth  Final   09/03/2021 No anaerobic organisms isolated after 4 days  Preliminary   09/02/2021 No Growth  Final   09/02/2021 No Growth  Final   08/20/2021 No Growth  Final   08/20/2021 No anaerobic organisms isolated  Final   08/13/2021 No Growth  Final   08/13/2021 No Growth  Final   08/09/2021 No Growth  Final   08/09/2021 No anaerobic organisms isolated  Final   07/30/2021 No Growth  Final   07/30/2021 No anaerobic organisms isolated  Final   07/30/2021 No Growth  Final   07/30/2021 No Growth  Final   07/30/2021 No Growth  Final   07/28/2021 No Growth  Final   07/28/2021 No Growth  Final   07/28/2021 No anaerobic organisms isolated  Final   07/27/2021 No Growth  Final   07/27/2021 No Growth  Final   07/26/2021 No Growth  Final   07/26/2021 No Growth  Final   07/21/2021 No Growth  Final   07/19/2021 2+ Candida albicans (A)  Final     Comment:     Susceptibilities not routinely done   07/19/2021 2+ Enterococcus faecium (A)  Final   07/16/2021 4+ Candida albicans (A)  Final     Comment:     Susceptibilities not routinely done   07/16/2021 1+ Normal fausto  Final   07/16/2021 No Growth  Final   07/16/2021 No Growth  Final   07/16/2021 No Growth  Final   07/14/2021 Culture in progress  Final   07/14/2021 4+ Enterococcus faecium VRE (A)  Final     Comment:     Preliminary result, confirmation pending.   07/14/2021 4+ Enterococcus faecium VRE (A)  Final     Comment:     Strain 2   07/14/2021 No anaerobic organisms isolated  Final   07/13/2021 No Growth  Final       Last check of C difficile  C Difficile Toxin B by PCR   Date Value Ref Range Status   08/05/2021 Negative Negative Final     Comment:     A negative result does not exclude actual disease due to C. difficile and may be due to improper collection, handling and storage of the specimen or the number of organisms in the specimen is below the  detection limit of the assay.       Virology:      No results found for: H6RES    No results found for: EBVDN, EBRES, EBVDN, EBVSP, EBVPC, EBVPCR    BK viral loads No lab results found.    Inflammatory Markers    Recent Labs   Lab Test 09/07/21  0632 09/06/21  0711 08/07/21  0637 08/06/21  0655 08/05/21  0536 08/04/21  0347 08/03/21  0620 08/02/21  0603   CRP 53.0* 67.0* 81.0* 57.0* 76.0* 89.0* 88.0* 93.0*       Immune Globulin Studies   No lab results found.    Metabolic Studies       Recent Labs   Lab Test 09/08/21  0710 09/07/21  0632 09/05/21  0637 09/04/21  0414 09/03/21  2150 09/03/21  0523 09/02/21  2031 09/02/21  1730 09/02/21  0545 09/01/21  0646   NA  --  128* 127* 128*  --  123*  --  124*  --  127*   POTASSIUM  --  3.7 3.8 3.1*  --  3.4  --  3.5  --  3.6   CHLORIDE  --  95 95 94  --  87*  --  85*  --  90*   CO2  --  20 18* 22  --  19*  --  21  --  25   ANIONGAP  --  13 14 12  --  17*  --  18*  --  12   BUN  --  35* 47* 32*  --  74*  --  68*  --  35*   CR  --  2.76* 3.56* 2.78*  --  4.90*  --  4.70*  --  2.90*   GFRESTIMATED  --  29* 22* 29*  --  15*  --  15*  --  28*   GLC  --  138* 123* 121*  --  146*  --  148*  --  106*   JANENE  --  8.6 9.2 8.3*  --  9.6  --  9.8  --  9.4   PHOS  --  4.8* 6.4* 4.4  --  10.9*   < >  --   --  7.6*   MAG 2.1 1.9 2.1 2.0  --  2.7*   < >  --   --  2.3   LACT  --   --   --  1.8 2.2*  --   --   --    < >  --     < > = values in this interval not displayed.       Hepatic Studies    Recent Labs   Lab Test 09/04/21 0414 09/03/21  0523 09/02/21  1730 08/31/21  0837 08/27/21  0731 08/26/21  0657 08/14/21 2055 08/09/21  0326   BILITOTAL 1.4* 2.0* 2.2* 1.7* 1.5* 1.6*  --  2.5*   ALKPHOS 81 95 120 100 104 108  --  121   ALBUMIN 2.9* 2.3* 2.7* 2.1* 2.1* 1.7*  --  1.6*   AST 38 39 57* 50* 56* 48*  --  41   ALT 29 31 40 31 24 23  --  22   LDH  --   --   --   --   --   --  169 191       Pancreatitis testing    Recent Labs   Lab Test 09/02/21 2031 06/29/21 0410 06/28/21  1839  05/18/21  1220 12/09/20  1504 10/06/20  0927   LIPASE 251  --  199 381 140 139   TRIG  --  Unsatisfactory specimen - icteric Canceled, Test credited  --   --   --        Hematology Studies      Recent Labs   Lab Test 09/07/21  0632 09/05/21  0637 09/04/21  2031 09/04/21  0409 09/03/21  0523 09/02/21  1730 08/31/21  0837 08/09/21  1312 08/02/21  1720 08/02/21  0603 06/29/21  0410 06/28/21  1839 05/18/21  1220 12/09/20  1504   WBC 11.2* 11.6*  --  9.8 15.7* 21.4* 9.6 15.1* 15.6* 15.7*  15.7*   < > 50.6* 13.2* 4.2   ANEU  --   --   --   --   --   --   --  11.6* 12.0* 11.9*  --  44.0* 12.0* 2.1   ALYM  --   --   --   --   --   --   --  0.6* 0.8 1.3  --  0.0* 0.6* 1.3   MEDHAT  --   --   --   --   --   --   --  1.8* 2.2* 2.2*  --  2.0* 0.4 0.6   AEOS  --   --   --   --   --   --   --  0.2 0.3 0.0  --  0.5 0.0 0.2   HGB 8.5* 8.6* 8.0* 6.8* 7.8* 9.3* 7.8* 8.3* 8.1* 8.1*  8.1*   < > 7.6* 16.6 17.2   HCT 26.4* 26.0*  --  20.8* 24.4* 29.2* 24.7* 24.8* 25.0* 24.8*  24.8*   < > 23.4* 44.8 47.7    262  --  257 316 386 336 290 125* 110*  110*   < > 127* 222 299    < > = values in this interval not displayed.       Arterial Blood Gas Testing    Recent Labs   Lab Test 09/02/21  1730 07/01/21  0014 06/30/21  1716 06/28/21  1839   O2PER 5 60.0 5L 2L        Urine Studies     Recent Labs   Lab Test 12/09/20  1817 10/06/20  1214   URINEPH 7.0 7.0   NITRITE Negative Negative   LEUKEST Negative Negative   WBCU 0 1       Vancomycin Levels     Recent Labs   Lab Test 09/08/21  0710 09/06/21  0646 06/30/21  0700   VANCOMYCIN 27.1* 25.2* 31.2*       Tobramycin levels     No lab results found.    Gentamicin levels    No lab results found.    Tacrolimus levels    Invalid input(s): TACROLIMUS, TAC, TACR  Transplant Immunosuppression Labs Latest Ref Rng & Units 9/7/2021 9/5/2021 9/4/2021 9/3/2021 9/2/2021   Creat 0.66 - 1.25 mg/dL 2.76(H) 3.56(H) 2.78(H) 4.90(H) 4.70(H)   BUN 7 - 30 mg/dL 35(H) 47(H) 32(H) 74(H) 68(H)   WBC 4.0 - 11.0  10e3/uL 11.2(H) 11.6(H) 9.8 15.7(H) 21.4(H)   Neutrophil % - - - - -   ANEU 1.6 - 8.3 10e3/uL - - - - -       Cyclosporine levels    Invalid input(s): CYCLOSPORINE, CYC    Mycophenolate levels    Invalid input(s): MYPA, MYP    Sirolimus levels    Invalid input(s): SIROLIMUS, SIR, RAPA    CSF testing   No lab results found.         Imaging:   XR Chest Port 1 View    Result Date: 9/2/2021  EXAM: XR CHEST PORT 1 VIEW  9/2/2021 5:24 PM HISTORY:  low O2 SATS   COMPARISON:  8/14/2021 FINDINGS: Portable supine radiograph of the chest. Right IJ CVC and left upper extremity PICC tip project over the low SVC. The trachea is midline. The cardiac silhouette is partially obscured. Large right pleural effusion with near total atelectasis of the right lung. No pneumothorax. The left lung is clear. The upper abdomen is unremarkable. Osseous structures are unchanged.     IMPRESSION: 1. Large right pleural effusion with near total right lung atelectasis. 2. The left lung is clear. I have personally reviewed the examination and initial interpretation and I agree with the findings. BRIAN SANCHEZ MD   SYSTEM ID:  [id number]    XR Abdomen Port 1 View    Result Date: 9/2/2021  EXAMINATION:  XR ABDOMEN PORT 1 VIEWS 9/2/2021 7:11 PM INDICATION: Elevated LA and increase in nausea. Please eval for acute patho COMPARISON: CT abdomen pelvis 8/23/2021. FINDINGS: Single AP supine view of the abdomen. The small intestine and colon are nondistended. Cystogastrostomy tube over the left upper quadrant. Gastrojejunostomy tube tip projected in the left lower quadrant. Ascites. No evidence of pneumatosis or portal venous gas. Limited evaluation of intra-abdominal free air due to supine position. Right-sided pleural effusion.     IMPRESSION: 1. Nonobstructive bowel gas pattern. Stable position of cystogastrostomy and gastrojejunostomy tube. 2. Moderate right-sided pleural effusion. I have personally reviewed the examination and initial interpretation and  I agree with the findings. BRIAN SANCHEZ MD   SYSTEM ID:  [id number]    CT Abdomen Pelvis w Contrast    Result Date: 9/2/2021  EXAMINATION: CT ABDOMEN PELVIS W CONTRAST  9/2/2021 8:19 PM  HISTORY: Abdominal abscess/infection suspected COMPARISON: CT abdomen and pelvis 8/23/2021 and 8/21/2021 PROCEDURE COMMENTS: CT of the abdomen and pelvis was performed with iopamidol ([id number]) solution 84 mL intravenous contrast. Coronal and sagittal reformatted images were obtained. FINDINGS: Stable percutaneous gastrojejunostomy tube with tip in the proximal jejunum. Interval removal of left lateral approach abdominal drain. Unchanged cystogastrostomy tube. Lower thorax: Slight decrease in large right pleural effusion with associated compressive atelectasis. Stable 10 mm subpleural pulmonary nodule in the lingula. The left lung is clear. Abdomen and pelvis: Unchanged hepatosplenomegaly. Decreased hypoattenuating lesion in segment 2, which measures 2.1 x 1.5 cm, previously measuring 2.1 x 2.0 cm. Scattered subcentimeter cystic hypodensities within the liver, likely representing simple hepatic cysts. No new focal liver lesions. Gallbladder is unremarkable. Interval decrease in 6.8 x 0.8 cm peripancreatic fluid collection with cyst gastrostomy tube in place and interval removal of left lateral approach abdominal drain, previously measuring 8.6 x 2.0 cm. Slight decrease in fluid collection adjacent to the pancreatic head measuring 2.8 x 1.6 cm, previously measuring 3.1 x 2.0 cm. Decrease fluid collection along the inferior aspect of the pancreas measuring 3.4 x 2.2 cm, previously measuring 4.1 x 2.7 cm. The adrenal glands are unremarkable. Edematous and poorly enhancing bilateral kidneys are similar to appearance from prior exam. No nephrolithiasis. Urinary bladder is decompressed. Decreased loculated ascites with persistence peritoneal enhancement and thickening which is seen within the pelvis and tracks up into the right  pericolic gutter. Largest pocket in the pelvis measuring 6.3 x 5.8 cm, previously measuring 9.6 x 8.1 cm. No abnormally dilated loops of large and small bowel. No evidence of bowel obstruction. No new fluid collection. No inguinal lymphadenopathy. Redemonstration of multiple prominent borderline enlarged mesenteric lymph nodes, likely reactive in nature. The major intra-abdominal vasculature is widely patent. Multiple perisplenic varicosities seen in the left upper abdomen. Mild anasarca. Bones: No suspicious or aggressive appearing bone lesions.     IMPRESSION: 1. Sequela of necrotizing pancreatitis with interval decrease in multiple peripancreatic fluid collections with interval removal of left lateral approach large-bore drain and stable positioning of cystogastrostomy tube. No new intra-abdominal fluid collections. 2. Decreased loculated ascites. 3. Decreased large right pleural effusion with associated compressive atelectasis. 4. Stable appearance of edematous and poorly enhancing kidneys. 5. Slight decrease in hypoattenuating lesion within segment 2 of the liver. I have personally reviewed the examination and initial interpretation and I agree with the findings. BRIAN SANCHEZ MD   SYSTEM ID:  [id number]

## 2021-09-08 NOTE — PROVIDER NOTIFICATION
D/I: Pt is alert and oriented x4. Tachycardic and dyspneic on 3L oxygen via NC over night. Right Chest dialysis access intact. PIV placed for CT contrast this morning. GJ tube patent with J--port running TF @ 60mL/hr with solution + enzymes/NaBicarb Q4h. G-port to gravity with large output. Scheduled oxycodone given for pain and Dilaudid oral PRN. Skin is jaundiced and fragile. Pt repositions independently in bed.   P: Will continue to monitor pt and follow POC. Dialysis this morning.

## 2021-09-09 LAB
ANION GAP SERPL CALCULATED.3IONS-SCNC: 13 MMOL/L (ref 3–14)
BUN SERPL-MCNC: 27 MG/DL (ref 7–30)
CALCIUM SERPL-MCNC: 9.1 MG/DL (ref 8.5–10.1)
CHLORIDE BLD-SCNC: 92 MMOL/L (ref 94–109)
CO2 SERPL-SCNC: 22 MMOL/L (ref 20–32)
CREAT SERPL-MCNC: 2.47 MG/DL (ref 0.66–1.25)
CRP SERPL-MCNC: 44 MG/L (ref 0–8)
ERYTHROCYTE [DISTWIDTH] IN BLOOD BY AUTOMATED COUNT: 16.5 % (ref 10–15)
GFR SERPL CREATININE-BSD FRML MDRD: 33 ML/MIN/1.73M2
GLUCOSE BLD-MCNC: 145 MG/DL (ref 70–99)
HCT VFR BLD AUTO: 29.3 % (ref 40–53)
HGB BLD-MCNC: 8.9 G/DL (ref 13.3–17.7)
HOLD SPECIMEN: NORMAL
LACTATE SERPL-SCNC: 1.6 MMOL/L (ref 0.7–2)
MAGNESIUM SERPL-MCNC: 2 MG/DL (ref 1.6–2.3)
MCH RBC QN AUTO: 30.3 PG (ref 26.5–33)
MCHC RBC AUTO-ENTMCNC: 30.4 G/DL (ref 31.5–36.5)
MCV RBC AUTO: 100 FL (ref 78–100)
PLATELET # BLD AUTO: 249 10E3/UL (ref 150–450)
POTASSIUM BLD-SCNC: 4.1 MMOL/L (ref 3.4–5.3)
RBC # BLD AUTO: 2.94 10E6/UL (ref 4.4–5.9)
SODIUM SERPL-SCNC: 127 MMOL/L (ref 133–144)
WBC # BLD AUTO: 12.8 10E3/UL (ref 4–11)

## 2021-09-09 PROCEDURE — 82310 ASSAY OF CALCIUM: CPT | Performed by: STUDENT IN AN ORGANIZED HEALTH CARE EDUCATION/TRAINING PROGRAM

## 2021-09-09 PROCEDURE — 250N000013 HC RX MED GY IP 250 OP 250 PS 637: Performed by: INTERNAL MEDICINE

## 2021-09-09 PROCEDURE — C9113 INJ PANTOPRAZOLE SODIUM, VIA: HCPCS | Performed by: INTERNAL MEDICINE

## 2021-09-09 PROCEDURE — 99233 SBSQ HOSP IP/OBS HIGH 50: CPT | Mod: GC | Performed by: STUDENT IN AN ORGANIZED HEALTH CARE EDUCATION/TRAINING PROGRAM

## 2021-09-09 PROCEDURE — 250N000011 HC RX IP 250 OP 636: Performed by: INTERNAL MEDICINE

## 2021-09-09 PROCEDURE — 250N000013 HC RX MED GY IP 250 OP 250 PS 637: Performed by: STUDENT IN AN ORGANIZED HEALTH CARE EDUCATION/TRAINING PROGRAM

## 2021-09-09 PROCEDURE — 85027 COMPLETE CBC AUTOMATED: CPT | Performed by: STUDENT IN AN ORGANIZED HEALTH CARE EDUCATION/TRAINING PROGRAM

## 2021-09-09 PROCEDURE — 36592 COLLECT BLOOD FROM PICC: CPT | Performed by: STUDENT IN AN ORGANIZED HEALTH CARE EDUCATION/TRAINING PROGRAM

## 2021-09-09 PROCEDURE — 83735 ASSAY OF MAGNESIUM: CPT | Performed by: INTERNAL MEDICINE

## 2021-09-09 PROCEDURE — 83605 ASSAY OF LACTIC ACID: CPT | Performed by: STUDENT IN AN ORGANIZED HEALTH CARE EDUCATION/TRAINING PROGRAM

## 2021-09-09 PROCEDURE — 86140 C-REACTIVE PROTEIN: CPT | Performed by: STUDENT IN AN ORGANIZED HEALTH CARE EDUCATION/TRAINING PROGRAM

## 2021-09-09 PROCEDURE — 258N000003 HC RX IP 258 OP 636: Performed by: STUDENT IN AN ORGANIZED HEALTH CARE EDUCATION/TRAINING PROGRAM

## 2021-09-09 PROCEDURE — 120N000002 HC R&B MED SURG/OB UMMC

## 2021-09-09 RX ADMIN — Medication 5 ML: at 17:06

## 2021-09-09 RX ADMIN — OXYCODONE HYDROCHLORIDE 5 MG: 5 SOLUTION ORAL at 00:20

## 2021-09-09 RX ADMIN — CALCIUM CARBONATE (ANTACID) CHEW TAB 500 MG 500 MG: 500 CHEW TAB at 20:59

## 2021-09-09 RX ADMIN — SEVELAMER CARBONATE 0.8 G: 800 POWDER, FOR SUSPENSION ORAL at 22:09

## 2021-09-09 RX ADMIN — PANTOPRAZOLE SODIUM 40 MG: 40 INJECTION, POWDER, FOR SOLUTION INTRAVENOUS at 07:53

## 2021-09-09 RX ADMIN — FOLIC ACID 1 MG: 1 TABLET ORAL at 09:11

## 2021-09-09 RX ADMIN — Medication 5 ML: at 09:12

## 2021-09-09 RX ADMIN — SODIUM BICARBONATE 325 MG: 325 TABLET ORAL at 22:09

## 2021-09-09 RX ADMIN — PANCRELIPASE 2 CAPSULE: 24000; 76000; 120000 CAPSULE, DELAYED RELEASE PELLETS ORAL at 04:44

## 2021-09-09 RX ADMIN — SODIUM BICARBONATE 325 MG: 325 TABLET ORAL at 17:05

## 2021-09-09 RX ADMIN — PANCRELIPASE 2 CAPSULE: 24000; 76000; 120000 CAPSULE, DELAYED RELEASE PELLETS ORAL at 17:05

## 2021-09-09 RX ADMIN — HYDROMORPHONE HYDROCHLORIDE 1 MG: 1 SOLUTION ORAL at 04:06

## 2021-09-09 RX ADMIN — PANCRELIPASE 2 CAPSULE: 24000; 76000; 120000 CAPSULE, DELAYED RELEASE PELLETS ORAL at 22:09

## 2021-09-09 RX ADMIN — SODIUM BICARBONATE 325 MG: 325 TABLET ORAL at 01:04

## 2021-09-09 RX ADMIN — SODIUM CHLORIDE 1000 ML: 9 INJECTION, SOLUTION INTRAVENOUS at 07:58

## 2021-09-09 RX ADMIN — ONDANSETRON 4 MG: 2 INJECTION INTRAMUSCULAR; INTRAVENOUS at 04:07

## 2021-09-09 RX ADMIN — ONDANSETRON 4 MG: 2 INJECTION INTRAMUSCULAR; INTRAVENOUS at 17:05

## 2021-09-09 RX ADMIN — HYDROXYZINE HYDROCHLORIDE 50 MG: 25 TABLET, FILM COATED ORAL at 17:05

## 2021-09-09 RX ADMIN — PANCRELIPASE 2 CAPSULE: 24000; 76000; 120000 CAPSULE, DELAYED RELEASE PELLETS ORAL at 21:00

## 2021-09-09 RX ADMIN — THIAMINE HCL TAB 100 MG 100 MG: 100 TAB at 09:11

## 2021-09-09 RX ADMIN — HYDROMORPHONE HYDROCHLORIDE 1 MG: 1 SOLUTION ORAL at 14:22

## 2021-09-09 RX ADMIN — SODIUM BICARBONATE 325 MG: 325 TABLET ORAL at 09:24

## 2021-09-09 RX ADMIN — OXYCODONE HYDROCHLORIDE 5 MG: 5 SOLUTION ORAL at 17:05

## 2021-09-09 RX ADMIN — OXYCODONE HYDROCHLORIDE 5 MG: 5 SOLUTION ORAL at 07:53

## 2021-09-09 RX ADMIN — PANCRELIPASE 2 CAPSULE: 24000; 76000; 120000 CAPSULE, DELAYED RELEASE PELLETS ORAL at 07:53

## 2021-09-09 RX ADMIN — PANCRELIPASE 2 CAPSULE: 24000; 76000; 120000 CAPSULE, DELAYED RELEASE PELLETS ORAL at 09:24

## 2021-09-09 RX ADMIN — CALCIUM CARBONATE (ANTACID) CHEW TAB 500 MG 500 MG: 500 CHEW TAB at 07:53

## 2021-09-09 RX ADMIN — SEVELAMER CARBONATE 0.8 G: 800 POWDER, FOR SUSPENSION ORAL at 09:11

## 2021-09-09 RX ADMIN — SEVELAMER CARBONATE 0.8 G: 800 POWDER, FOR SUSPENSION ORAL at 17:05

## 2021-09-09 RX ADMIN — HYDROMORPHONE HYDROCHLORIDE 1 MG: 1 SOLUTION ORAL at 20:59

## 2021-09-09 RX ADMIN — PANCRELIPASE 2 CAPSULE: 24000; 76000; 120000 CAPSULE, DELAYED RELEASE PELLETS ORAL at 01:04

## 2021-09-09 RX ADMIN — SODIUM BICARBONATE 325 MG: 325 TABLET ORAL at 04:45

## 2021-09-09 ASSESSMENT — ACTIVITIES OF DAILY LIVING (ADL)
ADLS_ACUITY_SCORE: 17

## 2021-09-09 ASSESSMENT — MIFFLIN-ST. JEOR: SCORE: 1462.75

## 2021-09-09 NOTE — PLAN OF CARE
RN assumed cares [phone]     Vitals: VSS except tachycardiac  Cardiac:   GI/: PEG G to gravity, J continuous feedings. Hyperactive bowel sounds in all four quadrants  Skin: Blanchable redness on the coccyx, dry and flaky on feet  IV/Drains:   left PICC running TKO  Activity:  Ax2 OOB, Ax1 when in bed  Nutrition: on enteral feedings with poor oral intake  Pain: PRN dilaudid given x1 this shift     Plan of Care:  Continue to encourage IV fluids and oral intake to promote nutrition. Dialysis scheduled for 0730 tomorrow AM

## 2021-09-09 NOTE — PLAN OF CARE
Late entry for 2300  A:  patient states having pain at g-tube site.  Scheduled tylenol and q4 hour dilaudid given with relief.  Having nausea and prn zofran given with relief.  Medium loose stool on bedpan at HS.  Voided in urinal times one.    R:  continue to monitor and treat per plan of care.

## 2021-09-09 NOTE — PLAN OF CARE
Assumed cares [phone]. VSS on 3-4L. A1 with turns in bed, refuses repositioning. Needs encouragement to complete tasks for himself independently. A&Ox4. TF infusing @ goal rate of 60mL/hr through J-tube and G-tube open to gravity. PEG tube site CDI. Encouraged to have HOB of bed elevated 30 degrees but refuses at times. HD line CDI. L triple lumen PICC TKO and SL. Blanchable redness on coccyx. Peeling skin on feet bilaterally. Oliguric and BM x2 this shift. Complained of abdominal pain radiating to back and managing with scheduled Oxycodone and PRN Dilaidud q4. One episode of emesis after eating, given Zofran x1 with relief. Will continue POC.

## 2021-09-09 NOTE — PROVIDER NOTIFICATION
09/08/21 1700   Call Information   Date of Call 09/08/21   Time of Call 1744   Name of person requesting the team [place]   Title of person requesting team RN   RRT Arrival time 1748   Time RRT ended 1751   Reason for call   Type of RRT Adult   Primary reason for call Sepsis suspected   Sepsis Suspected Elevated Lactate level;Heart Rate > 100;RR > 20, SaO2 <90% OR increasing O2 need   Was patient transferred from the ED, ICU, or PACU within last 24 hours prior to RRT call? No   SBAR   Situation LA 2.4   Background 30 year old male with hx of alcohol use disorder admitted on 6/28/2021 after recent prolonged admission at [facility] in [place] who has severe acute alcoholic hepatitis c/b HE, ESRD requiring HD, and malnutrition in the setting of acute necrotizing pancreatitis, s/p PEG-J placement on 7/13. He arrived with acute respiratory failure and septic shock that have resolved, and his secondary bacterial peritonitis has been appropriately treated. He requires continued management for ESRD, hepatic encephalopathy, infected pancreatic pseudocyst, nutritional support via PEG-J, and persistent right pleural effusion.   Notable History/Conditions End-Stage disease;Organ failure   Assessment A/OX3, tachycardic, tachypneic, OVSS on 4 LPM, reports feeling fine, jaundiced, recently returned from dialysis   Interventions No interventions   Patient Outcome   Patient Outcome Stabilized on unit   RRT Team   Attending/Primary/Covering Physician Daniela 3   Date Attending Physician notified 09/08/21   Time Attending Physician notified 1744   Physician(s) Tri Bravo RN Shelby Ugalde   Post RRT Intervention Assessment   Post RRT Assessment Stable/Improved   Date Follow Up Done 09/08/21   Time Follow Up Done 2014

## 2021-09-09 NOTE — PROGRESS NOTES
[facility]    Medicine Progress Note - Hospitalist Service, Daniela 3       Date of Admission:  6/28/2021    Assessment & Plan    Dax Villareal is a 30 year old male with hx of alcohol use disorder admitted on 6/28/2021 after recent prolonged admission at [facility] in [place] who has severe acute alcoholic hepatitis c/b HE, ESRD requiring HD, and malnutrition in the setting of acute necrotizing pancreatitis, s/p PEG-J placement on 7/13. He arrived with acute respiratory failure and septic shock that have resolved, and his secondary bacterial peritonitis has been appropriately treated. He requires continued management for ESRD, hepatic encephalopathy, infected pancreatic pseudocyst, nutritional support via PEG-J, and persistent right pleural effusion. Clinically improving after multiple necrosectomies. Increasing leukocytosis and hypotension on 9/2 prompting resumption of IV antibiotics - has been stable since that time. Repeat CT with interval improvement in fluid collections.     Today:   - 1L NS today  -  continues to work with Dax and insurance for dispo plan  - CBC, CRP tomorrow      #Septic Shock, resolved  #Necrotizing alcoholic pancreatitis w/ infected pseudocyst, improving   #Hepatic Fluid collection, stable to improving  #Secondary Bacterial Peritonitis 2/2 infected pseudocyst with VRE, improving  Admitted to the [facility] ICU on 6/28/2021 from [facility] in [place] for septic shock due to necrotizing pancreatitis. CT on admit showing mature collection with enhancing wall measuring ~[id number]. IR placed perc drain 6/29. Repeat imaging 7/18 revealed walled off necrotic collection amenable to endoscopic transluminal drainage with cystgastrostomy stenting with necrosectomy on 7/21/21. Complete necrosectomy on 8/11. Antibiotics (cefepime and metronidazole) were discontinued on 8/25 after left flank drain removed, re-initiated in the setting of fever on 9/2.  Favor abdominal etiology, repeat CT on 9/7 with interval improvement in fluid collections - will complete 7d course of abx per ID with EOT 9/8.   - Will likely need intermittent antibiotics with fluid collections occasionally with bacterial growth moving forward as to be expected in nec panc    # Hypovolemia  Patient with substantial daily NG output and poor PO. Also note significant weight loss over the past few weeks. May need intermittent boluses for euvolemia.   - 1L NS 9/9     #Gastric Outlet Obstruction  High G tube output due to the cystogastric connection/fluid drainage noted on 8/5. Per GI, possibly related to gastric outlet from severe abdominal inflammation and is expected. KUB negative for obstructive gas pattern. Continues to have moderate NG output - stable to improved.       #Hyponatremia, stable  Patient with down-trending sodium the week of 8/27; likely due to substantial NG output and low solute intake and hepatic dysfunction. Stable     #Right Pancreatic Hydrothorax, stable  Increased dyspnea with CXR on 7/19 with further imaging and thoracentesis confirming a hepatic/pancreatic hydrothorax. Thoracentesis PRN for dyspnea, most recently 9/3.     #Alcoholic Hepatitis  #Coagulation Defect  Received 1 week of steroids at [facility] for elevated INR and altered mental status. Not a liver transplant candidate. Mental status improved with aggressive lactulose and rifaxamin, which was discontinued without return of AMS.     ESRD due to ATN  Hyperphosphatemia  Stage III RADHA due to ATN from septic shock without recovery for >3 months. Line is TDC from PTA. Nephrology dialyzed on Tu, Th, Sa schedule.  - Will need follow-up and outpatient dialysis  - Sevelemer powder TID with TF for hyperphosphatemia      #Anemia due to Chronic Illness  #Hematochezia, resolved  #Hemoperitoneum  Baseline hgb 17. Running between 6-8 in setting of chronic illness, new ESRD with epocrit per Nephrology, frequent lab draws and  procedures. Hematochezia on 8/8 after starting standard intensity heparin, now resolved. Paracentesis on 8/21 with hemoperitoneum. No active bleeding. Required a unit of PRBC on 9/4 in the setting of several fluid boluses.   - Goal hgb > 7.0, trend Hgb     #Alcohol Use Disorder  Counseled patient on complete alcohol cessation. Not interested in additional services at this time. Continue daily folic acid and thiamine.     #Lactic Acidosis  Intermittent Lactic acidosis in the setting of sepsis, ESRD, bleeding.     #Hypokalemia  Noted in the setting of high gastric output and improved once G-tube output decreased. CTM.      #Severe Malnutrition  Poor oral intake in the setting of necrotizing pancreatitis. PEG tube placed 7/13. Dietitian consulted and continuous tube feeds with goal of 65 ml/hr with continuous pancreatic enzymes  - Continuous tube feeds 50 mL overnight.       Diet: Regular Diet Adult  Adult Formula Drip Feeding: Continuous Vital 1.5; Jejunostomy; Goal Rate: 60; mL/hr; Medication - Feeding Tube Flush Frequency: At least 15-30 mL water before and after medication administration and with tube clogging; Amount to Send (Nutrition us...    DVT Prophylaxis: Pneumatic Compression Devices  Rdz Catheter: Not present  Central Lines: PRESENT  PICC Triple Lumen 06/28/21 Left-Site Assessment: WDL  CVC Double Lumen Right-Site Assessment: WDL    Code Status: Full Code      Disposition Plan   Expected discharge: 09/13/2021   recommended to transitional care unit vs back to [facility] once safe disposition plan/ TCU bed available.      The patient's care was discussed with patient and his sister was called.     Patient seen by and staffed with Dr. Gresham.     Anali Jackson MD   Med/Peds, PGY-2  Hospitalist Service, [address]  Securely message with the Vocera Web Console (learn more here)  Text page via [employer] Paging/Directory  Please see sign  in/sign out for up to date coverage information  _____________________________________________________________________    Interval History   RN notes reviewed, patient with some pain around his gastrostomy tube overnight that resolved with PRN dilaudid. Triggered sepsis overnight, lactate elevated at 2.4 without intervention. Vital signs have since stabilized. Feeling a bit lightheaded this morning.    The remainder of a 4 point ROS is negative unless otherwise noted above.    Data reviewed today: I reviewed all medications, new labs and imaging results over the last 24 hours.    Physical Exam   Vital Signs: Temp: (!) 95.5  F (35.3  C) Temp src: Axillary BP: 95/45 Pulse: 117   Resp: 20 SpO2: 93 % O2 Device: Nasal cannula Oxygen Delivery: 5 LPM  Weight: 123 lbs 7.32 oz  Gen: Awake, lying in bed, comfortable, good mood  HEENT: NC/AT, sclera anicteric  CV: Regular rhythm, tachycardic   Resp: Non-labored breathing on room air, diminished breath sounds in entirety of right lung  Abd: soft, mild tenderness to palpation in LLQ, PEG site c/d/i   Extrem: Warm and well perfused with no edema. Dry skin

## 2021-09-09 NOTE — PROGRESS NOTES
ID Orange Team Progress Note      Patient:  Dax Villareal, Date of birth 1990, Medical record number [id number]  Date of Visit:  September 3, 2021         Assessment and Recommendations:   Problem List:  1. Leukocytosis - resolved  2. Known hypotension, tachycardia   3. Fever - resolved  4. Right pleural effusion, known - s/p thoracentesis   5. Necrotizing alcoholic pancreatitis with infected pseudocyst, s/p cystogastrostomy with necrosectomies and s/p flank drain removal (08/25)  6. Septate ascites    Recommendations:  - CT results show interval improvement/stablility of fluid collections and the patient has clinically improved and remained stable today after discontinuing antibiotics.  - Infectious Disease is signing off. Please do not hesitate to reach out to the orange team with any questions or concerns.     Discussion:  Suspect that the leukocytosis is secondary to probable bacterial growth in abdominal fluid pocket, possibly peripancreal or ascitic septa. Despite finishing long course of cefepime and metronidazole (08/06 - 08/25) with multiple other antibiotics prior to then, it is possible that the fluid collections were not sterile and that there is growth since discontinuing antibiotics. Paracentesis would likely be unhelpful for culture because antibiotics were initiated, and the ascites are septate so a sample from one area may not reflect a sample from another septa. SBP is of concern, however, the patient does not have significant or worsening abdominal pain and he feels well. Low concern for pulmonary cause. Pleural fluid with no growth to date. CT showed interval stability/improvement of fluid collections and the patient has clinically improved, so cessation of antibiotics is recommended. Intermittent fevers and WBC elevation is not unexpected in necrotizing pancreatitis patients, so it is possible that the patient may require antibiotic therapy for this issue again in the future.    Ailyn  ANITHA Soto  [facility] PGY-1        Interval History:   The patient reports again that he's feeling well today, on using computer during exam. No nausea beyond his baseline. No pain with urination. No rashes or skin changes. He denies SOB. No chest pain.         HPI:   Adopted from consult note and updated:     Dax Villareal is a 30 year old male with PMHx significant for alcohol use disorder who was admitted to [facility] in [place], [place] for abdominal pain, nausea and vomiting, found to have end stage liver disease c/b hepatic encephalopathy, acute renal failure requiring iHD, SBP and acute necrotizing pancreatitis.      He required respiratory support (BIPAP, ? Intubation) for respiratory failure and appeared to be in sepsis (lactate reported at 7.7 with abnormal LFTs). He was transferred to [facility] for prolonged admission and reportedly treated with steroids; kidney function and pancreatic enzymes normal on admission). Length of stay stated >40 days. CT imaging 6/19 with concern for necrotizing pancreatitis with follow up CT scan 6/25 with peripancreatic fluid collection and 9 cm soft tissue/hemorrhagic structure c/f internal hemorrhage of a pseudocyst. Given multiple antibiotics (Cefepime, Flagyl, Ciprofloxacin, micafungin). Hospital course reportedly complicated by hypoxemic respiratory failure requiring BIPAP, ARF requiring iHD, hepatic encephalopathy, and septic shock 2/2 necrotizing pancreatitis/pancreatic pseudocyst requiring pressor support. Ultimately transferred to [facility] for interventions 6/28. Admitted to the ICU.      Now status post perc IR drain placement 6/29 with cultures that remain NGTD/negative. No endoscopic drainage by GI was pursued given improvement in imaging 7/11. Additionally has had 3 paracenteses (7/2, 7/5, and 7/13 with negative culture work up. Had PEGJ placed 7/13.    His left flank drain was removed and his antibiotics were discontinued on 08/25.              Review of  Systems:     Full 9 pt ROS obtained and negative unless noted above in assessment and interval history.          Current Antimicrobials     Current:   - N/A    Prior:   - Linezolid 7/21 - 7/30  - Meropenem 6/29 - 7/18  - Micafungin 6/29 - 7/1  - Cipro 7/19 - 7/21  - Ceftriaxone (7/21 - 7/24) (7/26 - 7/27)  - Zosyn 7/27 - 8/6   - Cefepime (8/6 - 8/25) (9/2 - 9/8)  - Metronidazole (8/6 - 8/25) (9/2 - 9/8)  - Vancomcyin (6/29) (9/4 - 9/8)         Physical Exam:   Ranges for vital signs:  Temp:  [95.5  F (35.3  C)-98.9  F (37.2  C)] 95.5  F (35.3  C)  Pulse:  [101-121] 117  Resp:  [13-30] 20  BP: ()/(35-76) 95/45  SpO2:  [93 %-100 %] 93 %    Intake/Output Summary (Last 24 hours) at 9/3/2021 1336  Last data filed at 9/3/2021 1200  Gross per 24 hour   Intake 545 ml   Output 925 ml   Net -380 ml     Exam:  GENERAL:  Met patient in his room, resting comfortably in bed, alert, using computer.  ENT:  Head is normocephalic, atraumatic. Oropharynx is moist without exudates or ulcers.  EYES:  Eyes have anicteric sclerae.    NECK:  Supple.  LUNGS:  Decreased lung sounds on right side, left side is CTA.  CARDIOVASCULAR:  Tachycardic rate and regular rhythm with no murmurs, gallops or rubs.   ABDOMEN:  Normal bowel sounds, soft, nontender. G-tube site without erythema or pain.  EXT: Extremities warm and without edema.  SKIN:  No acute rashes. Lines are in place without any surrounding erythema or tenderness.  NEUROLOGIC:  Grossly nonfocal.         Laboratory Data:   Reviewed.  Pertinent for:    Laboratory Data:   No results found for: ACD4    Microbiology:  Culture   Date Value Ref Range Status   09/03/2021 No Growth  Final   09/03/2021 No Growth  Final   09/03/2021 No growth after 5 days  Preliminary   09/03/2021 No Growth  Final   09/03/2021 No anaerobic organisms isolated after 5 days  Preliminary   09/02/2021 No Growth  Final   09/02/2021 No Growth  Final   08/20/2021 No Growth  Final   08/20/2021 No anaerobic organisms  isolated  Final   08/13/2021 No Growth  Final   08/13/2021 No Growth  Final   08/09/2021 No Growth  Final   08/09/2021 No anaerobic organisms isolated  Final   07/30/2021 No Growth  Final   07/30/2021 No anaerobic organisms isolated  Final   07/30/2021 No Growth  Final   07/30/2021 No Growth  Final   07/30/2021 No Growth  Final   07/28/2021 No Growth  Final   07/28/2021 No Growth  Final   07/28/2021 No anaerobic organisms isolated  Final   07/27/2021 No Growth  Final   07/27/2021 No Growth  Final   07/26/2021 No Growth  Final   07/26/2021 No Growth  Final   07/21/2021 No Growth  Final   07/19/2021 2+ Candida albicans (A)  Final     Comment:     Susceptibilities not routinely done   07/19/2021 2+ Enterococcus faecium (A)  Final   07/16/2021 4+ Candida albicans (A)  Final     Comment:     Susceptibilities not routinely done   07/16/2021 1+ Normal fausto  Final   07/16/2021 No Growth  Final   07/16/2021 No Growth  Final   07/16/2021 No Growth  Final   07/14/2021 Culture in progress  Final   07/14/2021 4+ Enterococcus faecium VRE (A)  Final     Comment:     Preliminary result, confirmation pending.   07/14/2021 4+ Enterococcus faecium VRE (A)  Final     Comment:     Strain 2   07/14/2021 No anaerobic organisms isolated  Final   07/13/2021 No Growth  Final       Last check of C difficile  C Difficile Toxin B by PCR   Date Value Ref Range Status   08/05/2021 Negative Negative Final     Comment:     A negative result does not exclude actual disease due to C. difficile and may be due to improper collection, handling and storage of the specimen or the number of organisms in the specimen is below the detection limit of the assay.       Virology:      No results found for: H6RES    No results found for: EBVDN, EBRES, EBVDN, EBVSP, EBVPC, EBVPCR    BK viral loads No lab results found.    Inflammatory Markers    Recent Labs   Lab Test 09/07/21  0632 09/06/21  0711 08/07/21  0637 08/06/21  0655 08/05/21  0536 08/04/21  0347  08/03/21  0620 08/02/21  0603   CRP 53.0* 67.0* 81.0* 57.0* 76.0* 89.0* 88.0* 93.0*       Immune Globulin Studies   No lab results found.    Metabolic Studies       Recent Labs   Lab Test 09/09/21  0800 09/08/21  1721 09/08/21  0710 09/07/21  0632 09/05/21  0637 09/04/21  0414 09/03/21  0523 09/02/21  1730   *  --   --  128* 127* 128* 123* 124*   POTASSIUM 4.1  --   --  3.7 3.8 3.1* 3.4 3.5   CHLORIDE 92*  --   --  95 95 94 87* 85*   CO2 22  --   --  20 18* 22 19* 21   ANIONGAP 13  --   --  13 14 12 17* 18*   BUN 27  --   --  35* 47* 32* 74* 68*   CR 2.47*  --   --  2.76* 3.56* 2.78* 4.90* 4.70*   GFRESTIMATED 33*  --   --  29* 22* 29* 15* 15*   *  --   --  138* 123* 121* 146* 148*   JANENE 9.1  --   --  8.6 9.2 8.3* 9.6 9.8   PHOS  --   --   --  4.8* 6.4* 4.4 10.9*  --    MAG 2.0  --  2.1 1.9 2.1 2.0 2.7*  --    LACT  --  2.4*  --   --   --  1.8  --   --        Hepatic Studies    Recent Labs   Lab Test 09/04/21 0414 09/03/21 0523 09/02/21  1730 08/31/21  0837 08/27/21  0731 08/26/21  0657 08/14/21 2055 08/09/21  0326   BILITOTAL 1.4* 2.0* 2.2* 1.7* 1.5* 1.6*  --  2.5*   ALKPHOS 81 95 120 100 104 108  --  121   ALBUMIN 2.9* 2.3* 2.7* 2.1* 2.1* 1.7*  --  1.6*   AST 38 39 57* 50* 56* 48*  --  41   ALT 29 31 40 31 24 23  --  22   LDH  --   --   --   --   --   --  169 191       Pancreatitis testing    Recent Labs   Lab Test 09/02/21  2031 06/29/21  0410 06/28/21  1839 05/18/21  1220 12/09/20  1504 10/06/20  0927   LIPASE 251  --  199 381 140 139   TRIG  --  Unsatisfactory specimen - icteric Canceled, Test credited  --   --   --        Hematology Studies      Recent Labs   Lab Test 09/07/21  0632 09/05/21  0637 09/04/21 2031 09/04/21  0409 09/03/21  0523 09/02/21  1730 08/31/21  0837 08/09/21  1312 08/02/21  1720 08/02/21  0603 06/29/21  0410 06/28/21  1839 05/18/21  1220 12/09/20  1504   WBC 11.2* 11.6*  --  9.8 15.7* 21.4* 9.6 15.1* 15.6* 15.7*  15.7*   < > 50.6* 13.2* 4.2   ANEU  --   --   --   --   --    --   --  11.6* 12.0* 11.9*  --  44.0* 12.0* 2.1   ALYM  --   --   --   --   --   --   --  0.6* 0.8 1.3  --  0.0* 0.6* 1.3   MEDHAT  --   --   --   --   --   --   --  1.8* 2.2* 2.2*  --  2.0* 0.4 0.6   AEOS  --   --   --   --   --   --   --  0.2 0.3 0.0  --  0.5 0.0 0.2   HGB 8.5* 8.6* 8.0* 6.8* 7.8* 9.3* 7.8* 8.3* 8.1* 8.1*  8.1*   < > 7.6* 16.6 17.2   HCT 26.4* 26.0*  --  20.8* 24.4* 29.2* 24.7* 24.8* 25.0* 24.8*  24.8*   < > 23.4* 44.8 47.7    262  --  257 316 386 336 290 125* 110*  110*   < > 127* 222 299    < > = values in this interval not displayed.       Arterial Blood Gas Testing    Recent Labs   Lab Test 09/02/21  1730 07/01/21  0014 06/30/21  1716 06/28/21  1839   O2PER 5 60.0 5L 2L        Urine Studies     Recent Labs   Lab Test 12/09/20  1817 10/06/20  1214   URINEPH 7.0 7.0   NITRITE Negative Negative   LEUKEST Negative Negative   WBCU 0 1       Vancomycin Levels     Recent Labs   Lab Test 09/08/21  0710 09/06/21  0646 06/30/21  0700   VANCOMYCIN 27.1* 25.2* 31.2*       Tobramycin levels     No lab results found.    Gentamicin levels    No lab results found.    Tacrolimus levels    Invalid input(s): TACROLIMUS, TAC, TACR  Transplant Immunosuppression Labs Latest Ref Rng & Units 9/9/2021 9/7/2021 9/5/2021 9/4/2021 9/3/2021   Creat 0.66 - 1.25 mg/dL 2.47(H) 2.76(H) 3.56(H) 2.78(H) 4.90(H)   BUN 7 - 30 mg/dL 27 35(H) 47(H) 32(H) 74(H)   WBC 4.0 - 11.0 10e3/uL - 11.2(H) 11.6(H) 9.8 15.7(H)   Neutrophil % - - - - -   ANEU 1.6 - 8.3 10e3/uL - - - - -       Cyclosporine levels    Invalid input(s): CYCLOSPORINE, CYC    Mycophenolate levels    Invalid input(s): MYPA, MYP    Sirolimus levels    Invalid input(s): SIROLIMUS, SIR, RAPA    CSF testing   No lab results found.         Imaging:   XR Chest Port 1 View    Result Date: 9/2/2021  EXAM: XR CHEST PORT 1 VIEW  9/2/2021 5:24 PM HISTORY:  low O2 SATS   COMPARISON:  8/14/2021 FINDINGS: Portable supine radiograph of the chest. Right IJ CVC and left upper  extremity PICC tip project over the low SVC. The trachea is midline. The cardiac silhouette is partially obscured. Large right pleural effusion with near total atelectasis of the right lung. No pneumothorax. The left lung is clear. The upper abdomen is unremarkable. Osseous structures are unchanged.     IMPRESSION: 1. Large right pleural effusion with near total right lung atelectasis. 2. The left lung is clear. I have personally reviewed the examination and initial interpretation and I agree with the findings. BRIAN SANCHEZ MD   SYSTEM ID:  [id number]    XR Abdomen Port 1 View    Result Date: 9/2/2021  EXAMINATION:  XR ABDOMEN PORT 1 VIEWS 9/2/2021 7:11 PM INDICATION: Elevated LA and increase in nausea. Please eval for acute patho COMPARISON: CT abdomen pelvis 8/23/2021. FINDINGS: Single AP supine view of the abdomen. The small intestine and colon are nondistended. Cystogastrostomy tube over the left upper quadrant. Gastrojejunostomy tube tip projected in the left lower quadrant. Ascites. No evidence of pneumatosis or portal venous gas. Limited evaluation of intra-abdominal free air due to supine position. Right-sided pleural effusion.     IMPRESSION: 1. Nonobstructive bowel gas pattern. Stable position of cystogastrostomy and gastrojejunostomy tube. 2. Moderate right-sided pleural effusion. I have personally reviewed the examination and initial interpretation and I agree with the findings. BRIAN SANCHEZ MD   SYSTEM ID:  [id number]    CT Abdomen Pelvis w Contrast    Result Date: 9/2/2021  EXAMINATION: CT ABDOMEN PELVIS W CONTRAST  9/2/2021 8:19 PM  HISTORY: Abdominal abscess/infection suspected COMPARISON: CT abdomen and pelvis 8/23/2021 and 8/21/2021 PROCEDURE COMMENTS: CT of the abdomen and pelvis was performed with iopamidol ([id number]) solution 84 mL intravenous contrast. Coronal and sagittal reformatted images were obtained. FINDINGS: Stable percutaneous gastrojejunostomy tube with tip in the proximal jejunum.  Interval removal of left lateral approach abdominal drain. Unchanged cystogastrostomy tube. Lower thorax: Slight decrease in large right pleural effusion with associated compressive atelectasis. Stable 10 mm subpleural pulmonary nodule in the lingula. The left lung is clear. Abdomen and pelvis: Unchanged hepatosplenomegaly. Decreased hypoattenuating lesion in segment 2, which measures 2.1 x 1.5 cm, previously measuring 2.1 x 2.0 cm. Scattered subcentimeter cystic hypodensities within the liver, likely representing simple hepatic cysts. No new focal liver lesions. Gallbladder is unremarkable. Interval decrease in 6.8 x 0.8 cm peripancreatic fluid collection with cyst gastrostomy tube in place and interval removal of left lateral approach abdominal drain, previously measuring 8.6 x 2.0 cm. Slight decrease in fluid collection adjacent to the pancreatic head measuring 2.8 x 1.6 cm, previously measuring 3.1 x 2.0 cm. Decrease fluid collection along the inferior aspect of the pancreas measuring 3.4 x 2.2 cm, previously measuring 4.1 x 2.7 cm. The adrenal glands are unremarkable. Edematous and poorly enhancing bilateral kidneys are similar to appearance from prior exam. No nephrolithiasis. Urinary bladder is decompressed. Decreased loculated ascites with persistence peritoneal enhancement and thickening which is seen within the pelvis and tracks up into the right pericolic gutter. Largest pocket in the pelvis measuring 6.3 x 5.8 cm, previously measuring 9.6 x 8.1 cm. No abnormally dilated loops of large and small bowel. No evidence of bowel obstruction. No new fluid collection. No inguinal lymphadenopathy. Redemonstration of multiple prominent borderline enlarged mesenteric lymph nodes, likely reactive in nature. The major intra-abdominal vasculature is widely patent. Multiple perisplenic varicosities seen in the left upper abdomen. Mild anasarca. Bones: No suspicious or aggressive appearing bone lesions.     IMPRESSION:  1. Sequela of necrotizing pancreatitis with interval decrease in multiple peripancreatic fluid collections with interval removal of left lateral approach large-bore drain and stable positioning of cystogastrostomy tube. No new intra-abdominal fluid collections. 2. Decreased loculated ascites. 3. Decreased large right pleural effusion with associated compressive atelectasis. 4. Stable appearance of edematous and poorly enhancing kidneys. 5. Slight decrease in hypoattenuating lesion within segment 2 of the liver. I have personally reviewed the examination and initial interpretation and I agree with the findings. BRIAN SANCHEZ MD   SYSTEM ID:  [id number]

## 2021-09-10 LAB
BACTERIA PLR CULT: NORMAL
CRP SERPL-MCNC: 37 MG/L (ref 0–8)
ERYTHROCYTE [DISTWIDTH] IN BLOOD BY AUTOMATED COUNT: 16.2 % (ref 10–15)
HCT VFR BLD AUTO: 26 % (ref 40–53)
HGB BLD-MCNC: 8.2 G/DL (ref 13.3–17.7)
MAGNESIUM SERPL-MCNC: 2.1 MG/DL (ref 1.6–2.3)
MCH RBC QN AUTO: 30 PG (ref 26.5–33)
MCHC RBC AUTO-ENTMCNC: 31.5 G/DL (ref 31.5–36.5)
MCV RBC AUTO: 95 FL (ref 78–100)
PHOSPHATE SERPL-MCNC: 6.9 MG/DL (ref 2.5–4.5)
PLATELET # BLD AUTO: 230 10E3/UL (ref 150–450)
RBC # BLD AUTO: 2.73 10E6/UL (ref 4.4–5.9)
WBC # BLD AUTO: 11.3 10E3/UL (ref 4–11)

## 2021-09-10 PROCEDURE — 99233 SBSQ HOSP IP/OBS HIGH 50: CPT | Performed by: CLINICAL NURSE SPECIALIST

## 2021-09-10 PROCEDURE — 250N000013 HC RX MED GY IP 250 OP 250 PS 637: Performed by: INTERNAL MEDICINE

## 2021-09-10 PROCEDURE — 250N000013 HC RX MED GY IP 250 OP 250 PS 637: Performed by: STUDENT IN AN ORGANIZED HEALTH CARE EDUCATION/TRAINING PROGRAM

## 2021-09-10 PROCEDURE — 250N000011 HC RX IP 250 OP 636: Performed by: INTERNAL MEDICINE

## 2021-09-10 PROCEDURE — 634N000001 HC RX 634: Performed by: CLINICAL NURSE SPECIALIST

## 2021-09-10 PROCEDURE — 99207 PR CDG-CUT & PASTE-POTENTIAL IMPACT ON LEVEL: CPT | Performed by: STUDENT IN AN ORGANIZED HEALTH CARE EDUCATION/TRAINING PROGRAM

## 2021-09-10 PROCEDURE — 84100 ASSAY OF PHOSPHORUS: CPT | Performed by: STUDENT IN AN ORGANIZED HEALTH CARE EDUCATION/TRAINING PROGRAM

## 2021-09-10 PROCEDURE — 86140 C-REACTIVE PROTEIN: CPT | Performed by: INTERNAL MEDICINE

## 2021-09-10 PROCEDURE — 258N000003 HC RX IP 258 OP 636: Performed by: CLINICAL NURSE SPECIALIST

## 2021-09-10 PROCEDURE — 85027 COMPLETE CBC AUTOMATED: CPT | Performed by: STUDENT IN AN ORGANIZED HEALTH CARE EDUCATION/TRAINING PROGRAM

## 2021-09-10 PROCEDURE — 99233 SBSQ HOSP IP/OBS HIGH 50: CPT | Performed by: STUDENT IN AN ORGANIZED HEALTH CARE EDUCATION/TRAINING PROGRAM

## 2021-09-10 PROCEDURE — 36592 COLLECT BLOOD FROM PICC: CPT | Performed by: STUDENT IN AN ORGANIZED HEALTH CARE EDUCATION/TRAINING PROGRAM

## 2021-09-10 PROCEDURE — C9113 INJ PANTOPRAZOLE SODIUM, VIA: HCPCS | Performed by: INTERNAL MEDICINE

## 2021-09-10 PROCEDURE — 120N000002 HC R&B MED SURG/OB UMMC

## 2021-09-10 PROCEDURE — 83735 ASSAY OF MAGNESIUM: CPT | Performed by: INTERNAL MEDICINE

## 2021-09-10 PROCEDURE — 90937 HEMODIALYSIS REPEATED EVAL: CPT

## 2021-09-10 RX ADMIN — SODIUM BICARBONATE 325 MG: 325 TABLET ORAL at 02:39

## 2021-09-10 RX ADMIN — PANCRELIPASE 2 CAPSULE: 24000; 76000; 120000 CAPSULE, DELAYED RELEASE PELLETS ORAL at 13:21

## 2021-09-10 RX ADMIN — HYDROMORPHONE HYDROCHLORIDE 1 MG: 1 SOLUTION ORAL at 19:20

## 2021-09-10 RX ADMIN — Medication 1 PACKET: at 08:00

## 2021-09-10 RX ADMIN — SEVELAMER CARBONATE 0.8 G: 800 POWDER, FOR SUSPENSION ORAL at 08:01

## 2021-09-10 RX ADMIN — HYDROMORPHONE HYDROCHLORIDE 1 MG: 1 SOLUTION ORAL at 14:05

## 2021-09-10 RX ADMIN — OXYCODONE HYDROCHLORIDE 5 MG: 5 SOLUTION ORAL at 16:55

## 2021-09-10 RX ADMIN — ONDANSETRON 4 MG: 2 INJECTION INTRAMUSCULAR; INTRAVENOUS at 18:11

## 2021-09-10 RX ADMIN — HYDROMORPHONE HYDROCHLORIDE 1 MG: 1 SOLUTION ORAL at 10:40

## 2021-09-10 RX ADMIN — SEVELAMER CARBONATE 0.8 G: 800 POWDER, FOR SUSPENSION ORAL at 22:23

## 2021-09-10 RX ADMIN — Medication 5 ML: at 07:58

## 2021-09-10 RX ADMIN — SODIUM CHLORIDE 250 ML: 9 INJECTION, SOLUTION INTRAVENOUS at 09:06

## 2021-09-10 RX ADMIN — PANCRELIPASE 2 CAPSULE: 24000; 76000; 120000 CAPSULE, DELAYED RELEASE PELLETS ORAL at 22:07

## 2021-09-10 RX ADMIN — PANCRELIPASE 2 CAPSULE: 24000; 76000; 120000 CAPSULE, DELAYED RELEASE PELLETS ORAL at 02:39

## 2021-09-10 RX ADMIN — THIAMINE HCL TAB 100 MG 100 MG: 100 TAB at 07:58

## 2021-09-10 RX ADMIN — HYDROMORPHONE HYDROCHLORIDE 1 MG: 1 SOLUTION ORAL at 23:34

## 2021-09-10 RX ADMIN — SODIUM BICARBONATE 325 MG: 325 TABLET ORAL at 17:00

## 2021-09-10 RX ADMIN — EPOETIN ALFA-EPBX 5000 UNITS: 10000 INJECTION, SOLUTION INTRAVENOUS; SUBCUTANEOUS at 10:50

## 2021-09-10 RX ADMIN — PANCRELIPASE 2 CAPSULE: 24000; 76000; 120000 CAPSULE, DELAYED RELEASE PELLETS ORAL at 17:00

## 2021-09-10 RX ADMIN — HYDROMORPHONE HYDROCHLORIDE 1 MG: 1 SOLUTION ORAL at 05:52

## 2021-09-10 RX ADMIN — SODIUM CHLORIDE 300 ML: 9 INJECTION, SOLUTION INTRAVENOUS at 09:07

## 2021-09-10 RX ADMIN — Medication 1 PACKET: at 22:09

## 2021-09-10 RX ADMIN — SODIUM BICARBONATE 325 MG: 325 TABLET ORAL at 22:06

## 2021-09-10 RX ADMIN — SODIUM BICARBONATE 325 MG: 325 TABLET ORAL at 13:22

## 2021-09-10 RX ADMIN — FOLIC ACID 1 MG: 1 TABLET ORAL at 07:58

## 2021-09-10 RX ADMIN — SODIUM BICARBONATE 325 MG: 325 TABLET ORAL at 06:31

## 2021-09-10 RX ADMIN — Medication 10 ML: at 16:55

## 2021-09-10 RX ADMIN — HYDROMORPHONE HYDROCHLORIDE 1 MG: 1 SOLUTION ORAL at 01:45

## 2021-09-10 RX ADMIN — PANCRELIPASE 2 CAPSULE: 24000; 76000; 120000 CAPSULE, DELAYED RELEASE PELLETS ORAL at 06:31

## 2021-09-10 RX ADMIN — OXYCODONE HYDROCHLORIDE 5 MG: 5 SOLUTION ORAL at 00:15

## 2021-09-10 RX ADMIN — OXYCODONE HYDROCHLORIDE 5 MG: 5 SOLUTION ORAL at 07:58

## 2021-09-10 RX ADMIN — HYDROXYZINE HYDROCHLORIDE 50 MG: 25 TABLET, FILM COATED ORAL at 00:15

## 2021-09-10 RX ADMIN — PANTOPRAZOLE SODIUM 40 MG: 40 INJECTION, POWDER, FOR SOLUTION INTRAVENOUS at 07:59

## 2021-09-10 RX ADMIN — ONDANSETRON 4 MG: 2 INJECTION INTRAMUSCULAR; INTRAVENOUS at 09:06

## 2021-09-10 ASSESSMENT — ACTIVITIES OF DAILY LIVING (ADL)
ADLS_ACUITY_SCORE: 17

## 2021-09-10 ASSESSMENT — MIFFLIN-ST. JEOR
SCORE: 1582.75
SCORE: 1482.75

## 2021-09-10 NOTE — PROGRESS NOTES
[facility]    Progress Note - [place] 3 Service        Date of Admission:  6/28/2021    Assessment & Plan             Dax Villareal is a 30 year old male with hx of alcohol use disorder admitted on 6/28/2021 after recent prolonged admission at [facility] in [place] who has severe acute alcoholic hepatitis c/b HE, ESRD requiring HD, and malnutrition in the setting of acute necrotizing pancreatitis, s/p PEG-J placement on 7/13. He arrived with acute respiratory failure and septic shock that have resolved, and his secondary bacterial peritonitis has been appropriately treated. He requires continued management for ESRD, hepatic encephalopathy, infected pancreatic pseudocyst, nutritional support via PEG-J, and persistent right pleural effusion. Clinically improving after multiple necrosectomies. Increasing leukocytosis and hypotension on 9/2 prompting resumption of IV antibiotics - has been stable since that time. Repeat CT with interval improvement in fluid collections.     Today:   - Dialysis today, with 2.1L net fluid removal.  - Haircut and beard trim, appreciate nursing care  - Dispo planning     #Septic Shock, resolved  #Necrotizing alcoholic pancreatitis w/ infected pseudocyst, improving   #Hepatic Fluid collection, stable to improving  #Secondary Bacterial Peritonitis 2/2 infected pseudocyst with VRE, improving  Admitted to the [facility] ICU on 6/28/2021 from [facility] in [place] for septic shock due to necrotizing pancreatitis. CT on admit showing mature collection with enhancing wall measuring ~[id number]. IR placed perc drain 6/29. Repeat imaging 7/18 revealed walled off necrotic collection amenable to endoscopic transluminal drainage with cystgastrostomy stenting with necrosectomy on 7/21/21. Complete necrosectomy on 8/11. Antibiotics (cefepime and metronidazole) were discontinued on 8/25 after left flank drain removed, re-initiated in the setting of fever on  9/2. Favor abdominal etiology, repeat CT on 9/7 with interval improvement in fluid collections - will complete 7d course of abx per ID with EOT 9/8.   - Will likely need intermittent antibiotics with fluid collections occasionally with bacterial growth moving forward as to be expected in nec panc     # Hypovolemia  Patient with substantial daily NG output and poor PO. Also note significant weight loss over the past few weeks. May need intermittent boluses for euvolemia.   -Monitor vitals and output post dialysis, consider fluids prn.     #Gastric Outlet Obstruction  High G tube output due to the cystogastric connection/fluid drainage noted on 8/5. Per GI, possibly related to gastric outlet from severe abdominal inflammation and is expected. KUB negative for obstructive gas pattern. Continues to have moderate NG output - stable to improved.       #Hyponatremia, stable  Patient with down-trending sodium the week of 8/27; likely due to substantial NG output and low solute intake and hepatic dysfunction. Stable     #Right Pancreatic Hydrothorax, stable  Increased dyspnea with CXR on 7/19 with further imaging and thoracentesis confirming a hepatic/pancreatic hydrothorax. Thoracentesis PRN for dyspnea, most recently 9/3.     #Alcoholic Hepatitis  #Coagulation Defect  Received 1 week of steroids at [facility] for elevated INR and altered mental status. Not a liver transplant candidate. Mental status improved with aggressive lactulose and rifaxamin, which was discontinued without return of AMS.     ESRD due to ATN  Hyperphosphatemia  Stage III RADHA due to ATN from septic shock without recovery for >3 months. Line is TDC from [facility]. Nephrology dialyzed on Tu, Th, Sa schedule.  - Will need follow-up and outpatient dialysis  - Sevelemer powder TID with TF for hyperphosphatemia      #Anemia due to Chronic Illness  #Hematochezia, resolved  #Hemoperitoneum  Baseline hgb 17. Running between 6-8 in setting of chronic illness, new ESRD  with epocrit per Nephrology, frequent lab draws and procedures. Hematochezia on 8/8 after starting standard intensity heparin, now resolved. Paracentesis on 8/21 with hemoperitoneum. No active bleeding. Required a unit of PRBC on 9/4 in the setting of several fluid boluses.   - Goal hgb > 7.0, trend Hgb     #Alcohol Use Disorder  Counseled patient on complete alcohol cessation. Not interested in additional services at this time. Continue daily folic acid and thiamine.     #Lactic Acidosis  Intermittent Lactic acidosis in the setting of sepsis, ESRD, bleeding.     #Hypokalemia  Noted in the setting of high gastric output and improved once G-tube output decreased. CTM.      #Severe Malnutrition  Poor oral intake in the setting of necrotizing pancreatitis. PEG tube placed 7/13. Dietitian consulted and continuous tube feeds with goal of 65 ml/hr with continuous pancreatic enzymes  - Continuous tube feeds 50 mL overnight.       Diet: Regular Diet Adult  Adult Formula Drip Feeding: Continuous Vital 1.5; Jejunostomy; Goal Rate: 60; mL/hr; Medication - Feeding Tube Flush Frequency: At least 15-30 mL water before and after medication administration and with tube clogging; Amount to Send (Nutrition us...    DVT Prophylaxis: Pneumatic Compression Devices  Rdz Catheter: Not present  Central Lines: PRESENT  PICC Triple Lumen 06/28/21 Left-Site Assessment: WDL  CVC Double Lumen Right-Site Assessment: WDL  Code Status: Full Code      Disposition Plan   Expected discharge: 09/13/2021   recommended to transitional care unit once safe disposition plan/ TCU bed available.     The patient's care was discussed with the Bedside Nurse and Patient.    Hebert Gresham MD  [address]  Securely message with the Vocera Web Console (learn more here)  Text page via [employer] Paging/Directory  Please see sign in/sign out for up to date coverage  information      ______________________________________________________________________    Interval History   Feeling well post-dialysis, receiving a haircut and beard trim. No complaints on ROS.    Data reviewed today: I reviewed all medications, new labs and imaging results over the last 24 hours. I personally reviewed no images or EKG's today.    Physical Exam   Vital Signs: Temp: 98.9  F (37.2  C) Temp src: Axillary BP: 101/41 Pulse: (!) 122   Resp: 16 SpO2: 92 % O2 Device: Nasal cannula with humidification Oxygen Delivery: 3 LPM  Weight: 128 lbs 15.51 oz  General Appearance: NAD, pleasant, answering questions appropriately, lying in bed.  Respiratory: CTAB anteriorly, no signs of respiratory distress  Cardiovascular: RRR no MRG, normal S1 and S2  GI: Soft, ND  Skin: WWP     Data   Recent Labs   Lab 09/10/21  0741 09/09/21  0800 09/07/21  0632 09/05/21  0637 09/04/21  0414   WBC 11.3* 12.8* 11.2* 11.6*  --    HGB 8.2* 8.9* 8.5* 8.6*  --    MCV 95 100 96 92  --     249 237 262  --    NA  --  127* 128* 127* 128*   POTASSIUM  --  4.1 3.7 3.8 3.1*   CHLORIDE  --  92* 95 95 94   CO2  --  22 20 18* 22   BUN  --  27 35* 47* 32*   CR  --  2.47* 2.76* 3.56* 2.78*   ANIONGAP  --  13 13 14 12   JANENE  --  9.1 8.6 9.2 8.3*   GLC  --  145* 138* 123* 121*   ALBUMIN  --   --   --   --  2.9*   PROTTOTAL  --   --   --   --  8.8   BILITOTAL  --   --   --   --  1.4*   ALKPHOS  --   --   --   --  81   ALT  --   --   --   --  29   AST  --   --   --   --  38     No results found for this or any previous visit (from the past 24 hour(s)).  Medications     - MEDICATION INSTRUCTIONS -       dextrose       heparin (porcine) 1,000 Units/hr (08/24/21 0907)       sodium bicarbonate  325 mg Per Feeding Tube Q4H    And     amylase-lipase-protease  1-2 capsule Per Feeding Tube Q4H     amylase-lipase-protease  2 capsule Oral TID w/meals     B and C vitamin Complex with folic acid  5 mL Per Feeding Tube Daily     fiber modular (NUTRISOLivekickE  FIBER)  1 packet Per Feeding Tube TID     folic acid  1 mg Oral Daily     heparin lock flush  5-20 mL Intracatheter Q24H     melatonin  6 mg Oral At Bedtime     menthol   Transdermal Q8H     oxyCODONE  5 mg Oral Q8H     pantoprazole (PROTONIX) IV  40 mg Intravenous Daily with breakfast     protein modular  1 packet Per Feeding Tube Daily     sevelamer carbonate (RENVELA)  0.8 g Oral TID     sodium chloride (PF)  3 mL Intracatheter Q8H     sodium chloride (PF)  3 mL Intracatheter Q8H     sodium chloride (PF)  3 mL Intracatheter Q8H     sodium chloride (PF)  9 mL Intracatheter During Dialysis/CRRT (from stock)     vitamin B1  100 mg Oral Daily    Or     thiamine  100 mg Intravenous Daily

## 2021-09-10 NOTE — PLAN OF CARE
Assumed cares [id number]. VSS on 3-4L. A1 with turns in bed, refuses repositioning. Needs encouragement to complete tasks for himself independently. A&Ox4. TF infusing @ goal rate of 60mL/hr through J-tube and G-tube open to gravity. PEG tube site CDI. HD line CDI. L triple lumen PICC TKO and SL. Blanchable redness on coccyx. Barrier cream applied. Peeling skin on feet bilaterally. Oliguric and BM x1 this shift. Complained of abdominal pain radiating to back and managing with scheduled Oxycodone, PRN Atarax, and PRN Dilaidud q4. Will continue POC.

## 2021-09-10 NOTE — PLAN OF CARE
Patient continues to c/o abd pain with some relief from scheduled and PRN pain meds. Intermittent nausea with no emesis today. Poor appetite but ordered sea food from [employer] and was able to eat a few bites of crab along with a few bites of a biscuit. TF running at goal rate of 60 ml/hr. Site C/D/I. G-tube to gravity. Sepsis triggered and LA came back at 1.6. Needs encouragement to reposition in bed. Has been resting in-between cares and playing on laptop. Plan for dialysis tomorrow morning with transport time of 0810. Patient aware. Will continue with plan of care.

## 2021-09-10 NOTE — PROGRESS NOTES
HEMODIALYSIS TREATMENT NOTE    Date: 9/10/2021  Time: 12:35 PM    Data:  Pre Wt: 58.5 kg (128 lb 15.5 oz)   Desired Wt: 55.5 kg   Post Wt: 56.4 kg (124 lb 5.4 oz)  Weight change: 2.1 kg  Ultrafiltration - Post Run Net Total Removed (mL): 2100 mL  Vascular Access Status: patent  Dialyzer Rinse: Clear  Total Blood Volume Processed: 53.35 L Liters  Total Dialysis (Treatment) Time: 3 Hours    Lab:   no    Interventions/Assessment:  HD via cvc, 3k 2.25ca, 3 hour run, 2.1kg net removed, 350bfr/600dfr. Paitent tolerated run well, requested pain medication, PCN notified and administered medication. Pain improved as per patient. Epogen administered during run, patient slept for majority of run. CVC dressing changed in sterile fashion. cvc locked with saline, hand off report given to PCN.      Plan:    As per renal

## 2021-09-11 LAB
ANION GAP SERPL CALCULATED.3IONS-SCNC: 11 MMOL/L (ref 3–14)
BUN SERPL-MCNC: 24 MG/DL (ref 7–30)
CALCIUM SERPL-MCNC: 9.1 MG/DL (ref 8.5–10.1)
CHLORIDE BLD-SCNC: 95 MMOL/L (ref 94–109)
CO2 SERPL-SCNC: 22 MMOL/L (ref 20–32)
CREAT SERPL-MCNC: 2.56 MG/DL (ref 0.66–1.25)
GFR SERPL CREATININE-BSD FRML MDRD: 32 ML/MIN/1.73M2
GLUCOSE BLD-MCNC: 123 MG/DL (ref 70–99)
GLUCOSE BLDC GLUCOMTR-MCNC: 98 MG/DL (ref 70–99)
HOLD SPECIMEN: NORMAL
MAGNESIUM SERPL-MCNC: 1.8 MG/DL (ref 1.6–2.3)
POTASSIUM BLD-SCNC: 4.6 MMOL/L (ref 3.4–5.3)
SODIUM SERPL-SCNC: 128 MMOL/L (ref 133–144)

## 2021-09-11 PROCEDURE — 99233 SBSQ HOSP IP/OBS HIGH 50: CPT | Mod: GC | Performed by: STUDENT IN AN ORGANIZED HEALTH CARE EDUCATION/TRAINING PROGRAM

## 2021-09-11 PROCEDURE — 83735 ASSAY OF MAGNESIUM: CPT | Performed by: INTERNAL MEDICINE

## 2021-09-11 PROCEDURE — 250N000011 HC RX IP 250 OP 636: Performed by: INTERNAL MEDICINE

## 2021-09-11 PROCEDURE — 80048 BASIC METABOLIC PNL TOTAL CA: CPT | Performed by: STUDENT IN AN ORGANIZED HEALTH CARE EDUCATION/TRAINING PROGRAM

## 2021-09-11 PROCEDURE — 250N000013 HC RX MED GY IP 250 OP 250 PS 637: Performed by: STUDENT IN AN ORGANIZED HEALTH CARE EDUCATION/TRAINING PROGRAM

## 2021-09-11 PROCEDURE — 250N000013 HC RX MED GY IP 250 OP 250 PS 637: Performed by: INTERNAL MEDICINE

## 2021-09-11 PROCEDURE — 99207 PR CDG-CUT & PASTE-POTENTIAL IMPACT ON LEVEL: CPT | Performed by: STUDENT IN AN ORGANIZED HEALTH CARE EDUCATION/TRAINING PROGRAM

## 2021-09-11 PROCEDURE — 120N000002 HC R&B MED SURG/OB UMMC

## 2021-09-11 PROCEDURE — C9113 INJ PANTOPRAZOLE SODIUM, VIA: HCPCS | Performed by: INTERNAL MEDICINE

## 2021-09-11 PROCEDURE — 36592 COLLECT BLOOD FROM PICC: CPT | Performed by: STUDENT IN AN ORGANIZED HEALTH CARE EDUCATION/TRAINING PROGRAM

## 2021-09-11 RX ORDER — HEPARIN SODIUM 1000 [USP'U]/ML
500 INJECTION, SOLUTION INTRAVENOUS; SUBCUTANEOUS CONTINUOUS
Status: DISCONTINUED | OUTPATIENT
Start: 2021-09-11 | End: 2021-09-11

## 2021-09-11 RX ORDER — HEPARIN SODIUM 1000 [USP'U]/ML
500 INJECTION, SOLUTION INTRAVENOUS; SUBCUTANEOUS
Status: DISCONTINUED | OUTPATIENT
Start: 2021-09-11 | End: 2021-09-11

## 2021-09-11 RX ADMIN — PANCRELIPASE 2 CAPSULE: 24000; 76000; 120000 CAPSULE, DELAYED RELEASE PELLETS ORAL at 09:15

## 2021-09-11 RX ADMIN — Medication 5 ML: at 09:15

## 2021-09-11 RX ADMIN — SODIUM BICARBONATE 325 MG: 325 TABLET ORAL at 18:22

## 2021-09-11 RX ADMIN — SODIUM BICARBONATE 325 MG: 325 TABLET ORAL at 02:13

## 2021-09-11 RX ADMIN — PANCRELIPASE 2 CAPSULE: 24000; 76000; 120000 CAPSULE, DELAYED RELEASE PELLETS ORAL at 13:32

## 2021-09-11 RX ADMIN — SEVELAMER CARBONATE 0.8 G: 800 POWDER, FOR SUSPENSION ORAL at 09:15

## 2021-09-11 RX ADMIN — ONDANSETRON 4 MG: 4 TABLET, ORALLY DISINTEGRATING ORAL at 20:33

## 2021-09-11 RX ADMIN — MELATONIN TAB 3 MG 6 MG: 3 TAB at 02:23

## 2021-09-11 RX ADMIN — THIAMINE HCL TAB 100 MG 100 MG: 100 TAB at 09:15

## 2021-09-11 RX ADMIN — OXYCODONE HYDROCHLORIDE 5 MG: 5 SOLUTION ORAL at 01:01

## 2021-09-11 RX ADMIN — Medication 1 PACKET: at 09:15

## 2021-09-11 RX ADMIN — Medication 5 ML: at 06:01

## 2021-09-11 RX ADMIN — SODIUM BICARBONATE 325 MG: 325 TABLET ORAL at 21:55

## 2021-09-11 RX ADMIN — PANCRELIPASE 2 CAPSULE: 24000; 76000; 120000 CAPSULE, DELAYED RELEASE PELLETS ORAL at 21:54

## 2021-09-11 RX ADMIN — HYDROMORPHONE HYDROCHLORIDE 1 MG: 1 SOLUTION ORAL at 04:06

## 2021-09-11 RX ADMIN — HYDROMORPHONE HYDROCHLORIDE 1 MG: 1 SOLUTION ORAL at 13:44

## 2021-09-11 RX ADMIN — HYDROXYZINE HYDROCHLORIDE 50 MG: 25 TABLET, FILM COATED ORAL at 17:22

## 2021-09-11 RX ADMIN — PANTOPRAZOLE SODIUM 40 MG: 40 INJECTION, POWDER, FOR SOLUTION INTRAVENOUS at 09:15

## 2021-09-11 RX ADMIN — ONDANSETRON 4 MG: 2 INJECTION INTRAMUSCULAR; INTRAVENOUS at 02:11

## 2021-09-11 RX ADMIN — FOLIC ACID 1 MG: 1 TABLET ORAL at 09:15

## 2021-09-11 RX ADMIN — OXYCODONE HYDROCHLORIDE 5 MG: 5 SOLUTION ORAL at 09:15

## 2021-09-11 RX ADMIN — ONDANSETRON 4 MG: 4 TABLET, ORALLY DISINTEGRATING ORAL at 13:49

## 2021-09-11 RX ADMIN — CYCLOBENZAPRINE 10 MG: 10 TABLET, FILM COATED ORAL at 18:22

## 2021-09-11 RX ADMIN — SEVELAMER CARBONATE 0.8 G: 800 POWDER, FOR SUSPENSION ORAL at 20:19

## 2021-09-11 RX ADMIN — PANCRELIPASE 2 CAPSULE: 24000; 76000; 120000 CAPSULE, DELAYED RELEASE PELLETS ORAL at 02:13

## 2021-09-11 RX ADMIN — HYDROXYZINE HYDROCHLORIDE 50 MG: 25 TABLET, FILM COATED ORAL at 04:06

## 2021-09-11 RX ADMIN — PANCRELIPASE 2 CAPSULE: 24000; 76000; 120000 CAPSULE, DELAYED RELEASE PELLETS ORAL at 18:22

## 2021-09-11 RX ADMIN — MELATONIN TAB 3 MG 6 MG: 3 TAB at 21:55

## 2021-09-11 RX ADMIN — ACETAMINOPHEN 325 MG: 325 SOLUTION ORAL at 06:40

## 2021-09-11 RX ADMIN — HYDROMORPHONE HYDROCHLORIDE 1 MG: 1 SOLUTION ORAL at 20:34

## 2021-09-11 RX ADMIN — SODIUM BICARBONATE 325 MG: 325 TABLET ORAL at 09:15

## 2021-09-11 RX ADMIN — ACETAMINOPHEN 325 MG: 325 SOLUTION ORAL at 17:22

## 2021-09-11 RX ADMIN — OXYCODONE HYDROCHLORIDE 5 MG: 5 SOLUTION ORAL at 16:42

## 2021-09-11 RX ADMIN — SODIUM BICARBONATE 325 MG: 325 TABLET ORAL at 13:32

## 2021-09-11 ASSESSMENT — ACTIVITIES OF DAILY LIVING (ADL)
ADLS_ACUITY_SCORE: 17

## 2021-09-11 NOTE — PLAN OF CARE
Reason for Admission: Severe malnutrition, AHRF in presence of liver failure.      Status: Respiratory status improved, fluid status unchanged.      RN assumed cares at 0700     Vitals: WDL  Pain: Pt reports pain in the back and abdomen relieved with oral hydromorphone concentrate and oxycodone solution.   Neuro: Alert and oriented, however withdrawn and forgetful.   Cardiac: Tachycardic  Respiratory: Tachypneic at times  GI/: Loose stool this afternoon. Anuric on hemodialysis.   IV/Drains: Left PICC heparin locked. Single lumen TKO  Activity: Up with lift assist.   Skin: Jaundice and yadi.   Labs: WDL     Plan of Care: RN assumed cares at 0700, Pt alert and oriented, VS stable throughout the shift.  Pt refused hemodialysis this shift.  Pt had two small stools this shift. PICC line infusing TKO, two lumens heparin locked.  Tube feeds continue at 55 mL per hour with bicarb and creon Q4H.  Pt reports pain in the back and abdomen relieved with scheduled oxycodone and PRN hydromorphone.  No acute incidents this shift.  Continue to monitor and notify MD of any changes.

## 2021-09-11 NOTE — PROGRESS NOTES
Pt refused PICC dressing change for tonight and requested to have the dressing change tomorrow. RN was informed.

## 2021-09-11 NOTE — PROGRESS NOTES
[facility]    Progress Note - [place] 3 Service        Date of Admission:  6/28/2021    Assessment & Plan             Dax Villareal is a 30 year old male with hx of alcohol use disorder admitted on 6/28/2021 after recent prolonged admission at [facility] in [place] who has severe acute alcoholic hepatitis c/b HE, ESRD requiring HD, and malnutrition in the setting of acute necrotizing pancreatitis, s/p PEG-J placement on 7/13. He arrived with acute respiratory failure and septic shock that have resolved, and his secondary bacterial peritonitis has been appropriately treated. He requires continued management for ESRD, hepatic encephalopathy, infected pancreatic pseudocyst, nutritional support via PEG-J, and persistent right pleural effusion. Clinically improving after multiple necrosectomies. Increasing leukocytosis and hypotension on 9/2 prompting resumption of IV antibiotics - has been stable since that time. Repeat CT with interval improvement in fluid collections.     Today:   - Dialysis today, without fluid removal   - D/W Nephrology about concern for weight (67kg on admission to 58.5kg today)   - Doing well off of antibiotics  - Will need social work consult on Monday for disposition planning      #Septic Shock, resolved  #Necrotizing alcoholic pancreatitis w/ infected pseudocyst, improving   #Hepatic Fluid collection, stable to improving  #Secondary Bacterial Peritonitis 2/2 infected pseudocyst with VRE, improving  Admitted to the [facility] ICU on 6/28/2021 from [facility] in [place] for septic shock due to necrotizing pancreatitis. CT on admit showing mature collection with enhancing wall measuring ~[id number]. IR placed perc drain 6/29. Repeat imaging 7/18 revealed walled off necrotic collection amenable to endoscopic transluminal drainage with cystgastrostomy stenting with necrosectomy on 7/21/21. Complete necrosectomy on 8/11. Antibiotics (cefepime and  metronidazole) were discontinued on 8/25 after left flank drain removed, re-initiated in the setting of fever on 9/2. Favor abdominal etiology, repeat CT on 9/7 with interval improvement in fluid collections - will complete 7d course of abx per ID with EOT 9/8.   - Will likely need intermittent antibiotics with fluid collections occasionally with bacterial growth moving forward as to be expected in nec panc      # Hypovolemia  Patient with substantial daily NG output and poor PO. Also note significant weight loss over the past few weeks. May need intermittent boluses of normal saline for euvolemia.   - Admission weight 67kg. Now 58.5kg. Concern for weight loss vs. Targeting dry weight.   - D/W Nephrology about concern. Asked to re-evaluate volume status vs. weight  -Monitor vitals and output post dialysis, consider fluids prn.     #Gastric Outlet Obstruction  High G tube output due to the cystogastric connection/fluid drainage noted on 8/5. Per GI, possibly related to gastric outlet from severe abdominal inflammation and is expected. KUB negative for obstructive gas pattern. Continues to have mild to moderate NG output - stable to improved.       #Hyponatremia, stable  Patient with down-trending sodium the week of 8/27; likely due to substantial NG output and low solute intake and hepatic dysfunction. Stable 127-128.     #Right Pancreatic Hydrothorax, stable  Increased dyspnea with CXR on 7/19 with further imaging and thoracentesis confirming a hepatic/pancreatic hydrothorax. Thoracentesis PRN for dyspnea, most recently 9/3.     #Alcoholic Hepatitis  #Coagulation Defect  Received 1 week of steroids at [facility] for elevated INR and altered mental status. Not a liver transplant candidate. Mental status improved with aggressive lactulose and rifaxamin, which was discontinued without return of AMS.     ESRD due to ATN  Hyperphosphatemia  Stage III RADHA due to ATN from septic shock without recovery for >3 months. Line  is TDC from [facility]. Nephrology dialyzed on Tu, Th, Sa schedule.  - Will need follow-up and outpatient dialysis  - Sevelemer powder TID with TF for hyperphosphatemia      #Anemia due to Chronic Illness  #Hematochezia, resolved  #Hemoperitoneum  Baseline hgb 17. Running between 6-8 in setting of chronic illness, new ESRD with epocrit per Nephrology, frequent lab draws and procedures. Hematochezia on 8/8 after starting standard intensity heparin, now resolved. Paracentesis on 8/21 with hemoperitoneum. No active bleeding. Required a unit of PRBC on 9/4 in the setting of several fluid boluses.   - Goal hgb > 7.0, trend Hgb  - Hgb has been stable.      #Alcohol Use Disorder  Counseled patient on complete alcohol cessation. Not interested in additional services at this time. Continue daily folic acid and thiamine.     #Lactic Acidosis  Intermittent Lactic acidosis in the setting of sepsis, ESRD, bleeding.     #Hypokalemia  Noted in the setting of high gastric output and improved once G-tube output decreased. CTM.      #Severe Malnutrition  Poor oral intake in the setting of necrotizing pancreatitis. PEG tube placed 7/13. Dietitian consulted and continuous tube feeds with goal of 65 ml/hr with continuous pancreatic enzymes  - Continuous tube feeds 50 mL overnight.       Diet: Regular Diet Adult  Adult Formula Drip Feeding: Continuous Vital 1.5; Jejunostomy; Goal Rate: 60; mL/hr; Medication - Feeding Tube Flush Frequency: At least 15-30 mL water before and after medication administration and with tube clogging; Amount to Send (Nutrition us...    DVT Prophylaxis: Pneumatic Compression Devices  Rdz Catheter: Not present  Fluids: None   Central Lines: PRESENT  PICC Triple Lumen 06/28/21 Left-Site Assessment: WDL  CVC Double Lumen Right-Site Assessment: WDL  Code Status: Full Code      Disposition Plan   Expected discharge: 09/13/2021   recommended to transitional care unit once safe disposition plan/ TCU bed available.     The  patient's care was discussed with the Attending Physician, Dr. Gresham and Patient.    Tri Lozada MD  [place] [address] Service  [facility]  Securely message with the Vocera Web Console (learn more here)  Text page via [employer] Paging/Directory  Please see sign in/sign out for up to date coverage information    Clinically Significant Risk Factors Present on Admission                ______________________________________________________________________    Interval History   Dax reports that he is feeling well. He has no complaints. No lightheadedness, SOB, chest pain or abdominal pain. He reports that dialysis went well yesterday and that he felt fine afterwards.     Data reviewed today: I reviewed all medications, new labs and imaging results over the last 24 hours. I personally reviewed no images or EKG's today.    Physical Exam   Vital Signs: Temp: (!) 96.3  F (35.7  C) Temp src: Oral BP: 103/49 Pulse: 104   Resp: 16 SpO2: 90 % O2 Device: None (Room air) Oxygen Delivery: 3 LPM  Weight: 128 lbs 15.51 oz  Physical Exam  Vitals and nursing note reviewed.   Constitutional:       General: He is not in acute distress.     Appearance: Normal appearance. He is not ill-appearing.   HENT:      Mouth/Throat:      Mouth: Mucous membranes are moist.   Eyes:      Conjunctiva/sclera: Conjunctivae normal.      Pupils: Pupils are equal, round, and reactive to light.   Cardiovascular:      Rate and Rhythm: Normal rate and regular rhythm.      Heart sounds: Normal heart sounds.   Pulmonary:      Effort: Pulmonary effort is normal.      Breath sounds: Normal breath sounds.   Abdominal:      General: Bowel sounds are normal.      Palpations: Abdomen is soft.      Tenderness: There is no abdominal tenderness.   Musculoskeletal:      Right lower leg: No edema.      Left lower leg: No edema.   Skin:     General: Skin is warm and dry.   Neurological:      General: No focal deficit present.       Mental Status: He is alert.   Psychiatric:         Mood and Affect: Mood normal.           Data   Recent Labs   Lab 09/11/21  0609 09/10/21  0741 09/09/21  0800 09/07/21  0632   WBC  --  11.3* 12.8* 11.2*   HGB  --  8.2* 8.9* 8.5*   MCV  --  95 100 96   PLT  --  230 249 237   *  --  127* 128*   POTASSIUM 4.6  --  4.1 3.7   CHLORIDE 95  --  92* 95   CO2 22  --  22 20   BUN 24  --  27 35*   CR 2.56*  --  2.47* 2.76*   ANIONGAP 11  --  13 13   JANENE 9.1  --  9.1 8.6   *  --  145* 138*     No results found for this or any previous visit (from the past 24 hour(s)).  Medications     - MEDICATION INSTRUCTIONS -       dextrose       heparin (porcine) 1,000 Units/hr (08/24/21 0907)       sodium bicarbonate  325 mg Per Feeding Tube Q4H    And     amylase-lipase-protease  1-2 capsule Per Feeding Tube Q4H     amylase-lipase-protease  2 capsule Oral TID w/meals     B and C vitamin Complex with folic acid  5 mL Per Feeding Tube Daily     fiber modular (NUTRISOURCE FIBER)  1 packet Per Feeding Tube TID     folic acid  1 mg Oral Daily     heparin lock flush  5-20 mL Intracatheter Q24H     melatonin  6 mg Oral At Bedtime     menthol   Transdermal Q8H     oxyCODONE  5 mg Oral Q8H     pantoprazole (PROTONIX) IV  40 mg Intravenous Daily with breakfast     protein modular  1 packet Per Feeding Tube Daily     sevelamer carbonate (RENVELA)  0.8 g Oral TID     sodium chloride (PF)  3 mL Intracatheter Q8H     sodium chloride (PF)  3 mL Intracatheter Q8H     sodium chloride (PF)  3 mL Intracatheter Q8H     vitamin B1  100 mg Oral Daily    Or     thiamine  100 mg Intravenous Daily

## 2021-09-11 NOTE — PLAN OF CARE
"[facility] Nursing Summary of Care    Patient Name: Dax Villareal MRN# [id number]   Age: 30 year old YOB: 1990   Date of Admission: 6/28/2021    Care delivered on September 10, 2021 from 19:00 to 07:30 on September 11, 2021       No acute events or changes noted overnight    Please refer to Flowsheets for comprehensive assessment data.        Plan: Continue with current plan of care, managing pain and nausea as needed.      Vitals: VSS ex intermittent HTN and low-grade fever  o /42 (BP Location: Right arm)   Pulse (!) 123   Temp 99.6  F (37.6  C) (Oral)   Resp 17   Ht 1.676 m (5' 6\")   Wt 58.5 kg (128 lb 15.5 oz)   SpO2 95%   BMI 20.82 kg/m      Neuro: A&Ox4. CN II-X grossly intact  o Pain: Generalized pain satisfactorily managed with current regimen  o Mood/Behavior: Calm/cooperative    Activity: Extensive assistance of two with mechanical lift    Cardiovascular: Tachycardic--perfusing well    Respiratory: Intermittent tachypnea, but no increased WOB or signs of acute respiratory distress.     GI & Nutrition: PEG C/D/I. Abdomen is soft with some tenderness to palpation in LLQ. Enteral feedings changed Q4H with NaHCO3 and amylase-lipase-protease added.  o Nausea: Intermittent--well controlled with PRN ondansetron     : Oliguric with tea-colored void once overnight    Skin, Wounds & LDAs: No acute changes noted.         Intake/Output Summary (Last 24 hours) at 9/11/2021 0732  Last data filed at 9/11/2021 0700  Gross per 24 hour   Intake 1240 ml   Output 2100 ml   Net -860 ml       Lines/Tubes/Drains:   PICC Triple Lumen 06/28/21 Left (Active)   Site Assessment WDL except;Other (Comment) 09/10/21 2200   External Cath Length (cm) 3 cm 08/19/21 0911   Extremity Circumference (cm) 28 cm 07/12/21 1630   Dressing Intervention Transparent 09/09/21 1800   Dressing Change Due 09/10/21 09/10/21 2200   PICC Comment CDI 08/24/21 1600   Gray - Status heparin locked 09/10/21 2200   Gray - Cap Change " Due 09/12/21 09/10/21 2200   Red - Status infusing 09/10/21 2200   Red - Cap Change Due 09/12/21 09/10/21 2200   White - Status heparin locked 09/10/21 2200   White - Cap Change Due 09/12/21 09/10/21 2200   Line Necessity Yes, meets criteria 09/09/21 1800   Number of days: 75       CVC Double Lumen Right (Active)   Site Assessment WDL 09/10/21 2200   External Cath Length (cm) 1.5 cm 09/10/21 1200   Dressing Type Chlorhexidine sponge;Transparent 09/10/21 1200   Dressing Status clean;dry;intact;removed 09/10/21 1200   Dressing Intervention new dressing 09/10/21 1200   Dressing Change Due 09/17/21 09/10/21 1200   Line Necessity yes, meets criteria 07/21/21 1359   Blue - Status saline locked;cap changed 09/10/21 1200   Blue - Cap Change Due 07/19/21 07/13/21 0800   Red - Status saline locked;cap changed 09/10/21 1200   Red - Cap Change Due 09/17/21 09/10/21 1200   Phlebitis Scale 0-->no symptoms 07/21/21 1445   Infiltration? no 07/21/21 1445   Infiltration Scale 0 07/21/21 1445   Infiltration Site Treatment Method  None 07/20/21 1210   Was a vesicant infusing? no 07/13/21 1400   CVC Comment HD 09/10/21 1200   Number of days:        RN-Managed Electrolyte Labs  Potassium   Date Value Ref Range Status   09/11/2021 4.6 3.4 - 5.3 mmol/L Final   07/11/2021 4.6 3.4 - 5.3 mmol/L Final     Magnesium   Date Value Ref Range Status   09/11/2021 1.8 1.6 - 2.3 mg/dL Final   07/05/2021 2.3 1.6 - 2.3 mg/dL Final     Phosphorus   Date Value Ref Range Status   09/10/2021 6.9 (H) 2.5 - 4.5 mg/dL Final   07/07/2021 3.9 2.5 - 4.5 mg/dL Final       Current Facility-Administered Medications   Medication     acetaminophen (TYLENOL) solution 325 mg     sodium bicarbonate tablet 325 mg    And     amylase-lipase-protease (CREON 24) 77329-17582 units per EC capsule 1-2 capsule     amylase-lipase-protease (CREON 24) 54583-71456 units per EC capsule 2 capsule     B and C vitamin Complex with folic acid (NEPHRONEX) liquid 5 mL     benzonatate  (TESSALON) capsule 100 mg     calcium carbonate (TUMS) chewable tablet 500 mg     Cetaphil Moisturizing (CETAPHIL)     cyclobenzaprine (FLEXERIL) tablet 10 mg     Daily 2 GRAM acetaminophen limit, unless fulminent liver failure or transaminases greater than or equal to 300 - 400, then none     dextrose 10% infusion     glucose gel 15-30 g    Or     dextrose 50 % injection 25-50 mL    Or     glucagon injection 1 mg     fiber modular (NUTRISOURCE FIBER) packet 1 packet     folic acid (FOLVITE) tablet 1 mg     guaiFENesin (ROBITUSSIN) 20 mg/mL solution 10 mL     heparin 10,000 units/10 mL infusion (DIALYSIS USE)     heparin lock flush 10 UNIT/ML injection 5-20 mL     heparin lock flush 10 UNIT/ML injection 5-20 mL     HYDROmorphone (STANDARD CONC) (DILAUDID) oral solution 1 mg     hydrOXYzine (ATARAX) tablet 25-50 mg     lidocaine (LMX4) cream     lidocaine 1 % 0.1-1 mL     melatonin tablet 6 mg     menthol (ICY HOT) 5 % patch 1 patch    And     menthol (ICY HOT) Patch in Place     naloxone (NARCAN) injection 0.2 mg    Or     naloxone (NARCAN) injection 0.4 mg    Or     naloxone (NARCAN) injection 0.2 mg    Or     naloxone (NARCAN) injection 0.4 mg     nicotine (COMMIT) lozenge 4 mg     ondansetron (ZOFRAN-ODT) ODT tab 4 mg    Or     ondansetron (ZOFRAN) injection 4 mg     oxyCODONE (ROXICODONE) solution 5 mg     pantoprazole (PROTONIX) IV push injection 40 mg     polyethylene glycol (MIRALAX) Packet 17 g     protein modular (PROSOURCE TF Free) 1 packet     sevelamer carbonate (RENVELA) Packet 0.8 g     silver nitrate (ARZOL) Misc     sodium chloride (PF) 0.9% PF flush 10-20 mL     sodium chloride (PF) 0.9% PF flush 3 mL     sodium chloride (PF) 0.9% PF flush 3 mL     sodium chloride (PF) 0.9% PF flush 3 mL     sodium chloride (PF) 0.9% PF flush 3 mL     sodium chloride (PF) 0.9% PF flush 3 mL     sodium chloride (PF) 0.9% PF flush 3 mL     sodium chloride (PF) 0.9% PF flush 3 mL     sodium chloride (PF) 0.9% PF flush  9 mL     thiamine (B-1) tablet 100 mg    Or     thiamine (B-1) injection 100 mg     zinc oxide (DESITIN) 40 % ointment     Past Medical History:   Diagnosis Date     ADHD 2008     Past Surgical History:   Procedure Laterality Date     ENDOSCOPIC INSERTION TUBE GASTROSTOMY N/A 7/13/2021    Procedure: Upper endoscopy, INSERTION, PEG-J TUBE and Gastropexy;  Surgeon: Rayshawn Will MD;  Location: [facility] OR     ENDOSCOPIC ULTRASOUND UPPER GASTROINTESTINAL TRACT (GI) N/A 7/21/2021    Procedure: ENDOSCOPIC ULTRASOUND, ESOPHAGOSCOPY / UPPER GASTROINTESTINAL TRACT (GI) with cyst gastrostomy, dilation;  Surgeon: Guru Yecenia Chacko MD;  Location: [facility] OR     ENDOSCOPIC ULTRASOUND, ESOPHAGOSCOPY, GASTROSCOPY, DUODENOSCOPY (EGD), NECROSECTOMY N/A 8/11/2021    Procedure: ESOPHAGOGASTRODUODENOSCOPY (EGD) with necrosectomy, stent exchange.;  Surgeon: Amadou Beasley MD;  Location: [facility] OR     ESOPHAGOSCOPY, GASTROSCOPY, DUODENOSCOPY (EGD), COMBINED N/A 7/28/2021    Procedure: ESOPHAGOGASTRODUODENOSCOPY (EGD), gastrostomy apposition, Necrosectomy, stent exchange.;  Surgeon: Rayshawn Will MD;  Location: [facility]     ESOPHAGOSCOPY, GASTROSCOPY, DUODENOSCOPY (EGD), COMBINED N/A 8/6/2021    Procedure: ESOPHAGOGASTRODUODENOSCOPY (EGD) with necrosectomy, and stents exchanged;  Surgeon: Jose Quigley MD;  Location: [facility] OR     IR ABSCESS TUBE CHANGE  7/14/2021     IR PARACENTESIS  7/21/2021     IR SINOGRAM INJECTION DIAGNOSTIC  8/25/2021     ORTHOPEDIC SURGERY Right     fracture repair     REPLACE GASTROSTOMY TUBE, PERCUTANEOUS N/A 7/28/2021    Procedure: gastro-jejunostomy tube exchange;  Surgeon: Rayshawn Will MD;  Location: [facility] OR     No medications prior to admission.     Unresulted Labs Ordered in the Past 30 Days of this Admission       Date and Time Order Name Status Description    8/21/2021  7:15 AM Prepare red blood cells (unit) Preliminary     8/14/2021 11:21 AM Fungus Culture, non-blood  Preliminary     7/14/2021  4:30 AM Prepare red blood cells (unit) Preliminary     7/13/2021  1:16 PM Prepare red blood cells (unit) Preliminary     6/29/2021  7:53 AM Plasma prepare order unit In process             Jona Garber RN  5B General Medicine

## 2021-09-11 NOTE — PLAN OF CARE
Reason for Admission: Severe malnutrition, AHRF in presence of liver failure.     Status: Respiratory status improved, fluid status unchanged.     RN assumed cares at 0700    Vitals: WDL  Pain: Pt reports pain in the back and abdomen relieved with oral hydromorphone concentrate and oxycodone solution.   Neuro: Alert and oriented, however withdrawn and forgetful.   Cardiac: Tachycardic  Respiratory: Tachypneic at times  GI/: Loose stool this afternoon. Anuric on hemodialysis.   IV/Drains: Left PICC heparin locked. Single lumen TKO  Activity: Up with lift assist.   Skin: Jaundice and yadi.   Labs: WDL    Plan of Care: RN assumed cares at 0700, Pt alert and oriented, VS stable throughout the shift with some baseline hypotension.  Pt taken for hemodialysis this AM, uneventful run.  Pt reports pain in the back and abdomen relieved with PRN and schedule pain medications.  Pt requires much help with positioning and organization of room.  PICC heparin locked on two lumens, TKO on remaining.  Tube feeds running through J at goal rate of 60.  Plan to discharge to TCU once accepted and hemodialysis schedule established.  No acute incidents this shift.  Continue to monitor and notify MD of any changes.

## 2021-09-11 NOTE — PROGRESS NOTES
Nephrology Progress Note  9/10/2021         Dax Villareal is a 30 year old male with h/o ETOH hepatitis, end stage liver disease, RADHA on HD, transferred from [facility] in [place] for septic shock on 6/28/21 for treatment of necrotizing pancreatitis and decompensated liver disease. He was initially admitted to [facility] 5/19/21 and started on RRT 5/26/2021.        Interval History :   Mr Villareal had necrosectomy for 4th time on 8/11 with no more anticipated.  Scheduled for HD today, stable on 3x/week HD although we skipped one day last week with relative hypotension, is not very compliant with HD diet.  Plan for 1-2L of UF on run today as BP's tolerate, on EPO and sevelamer with Phos WNL today.        Assessment & Recommendations:   ESRD-Cr was 0.8 as recently as 5/19/2021 but now HD dependent for 3 months, started RRT at [facility] prior to transfer to [facility] on 5/26.  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  continuing restorative cares for now, BP's have been stable enough to support HD.  Had GI stenting of pancreas x4.                 -Line is TDC from PTA               -HD today, ordered 1-2L of UF as BP's allow, overall is net even to negative on his own due to large drain output.       Volume status-Anuric, wt stable despite minimal UF with runs due to significant drain output.        Electrolytes/pH-K 3.7, bicarb 20.  Running HD today, Na 128, chronically runs low despite HD likely due to H2O/Soda intake.      Ca/phos/pth-Ca 9.6, Ca 8.6, Mg 1.9 and Phos 4.8.                   Anemia-Hgb low at 8.5, acute management per team, last PRBC's 7/14.  Started on EPO 5k with runs, iron sats 43%      Nutrition-Taking PO, appetite reasonable.        Recommendations were communicated to primary team via note          SOFIA Marlow CNS  Clinical Nurse Specialist  [phone]    Review of Systems:   I reviewed the following  "systems:  Gen: No fevers or chills  CV: No CP at rest  Resp: No SOB at rest  GI: No N/V    Physical Exam:   I/O last 3 completed shifts:  In: 620 [I.V.:30; NG/GT:350]  Out: 2100 [Other:2100]   /42 (BP Location: Right arm)   Pulse (!) 123   Temp 99.6  F (37.6  C) (Oral)   Resp 17   Ht 1.676 m (5' 6\")   Wt 58.5 kg (128 lb 15.5 oz)   SpO2 95%   BMI 20.82 kg/m       GENERAL APPEARANCE: Lying in bed, in no distress.    EYES:  scleral icterus, pupils equal  Pulmonary: decreased BS   CV: tachy   - Edema - no LE edema  GI: mild distention, non-tender. Has pancreatic drain with large amounts of yellowish clear non purulent fluid  MS: no evidence of inflammation in joints, no muscle tenderness  SKIN: no rash, warm, dry, no cyanosis  NEURO: alert/interactive  ACCESS: Right TDC with no induration or erythema    Labs:   All labs reviewed by me  Electrolytes/Renal - Recent Labs   Lab Test 09/11/21  0609 09/10/21  0741 09/09/21  0800 09/07/21  0632 09/05/21  0637   *  --  127* 128* 127*   POTASSIUM 4.6  --  4.1 3.7 3.8   CHLORIDE 95  --  92* 95 95   CO2 22  --  22 20 18*   BUN 24  --  27 35* 47*   CR 2.56*  --  2.47* 2.76* 3.56*   *  --  145* 138* 123*   JANENE 9.1  --  9.1 8.6 9.2   MAG 1.8 2.1 2.0 1.9 2.1   PHOS  --  6.9*  --  4.8* 6.4*       CBC -   Recent Labs   Lab Test 09/10/21  0741 09/09/21  0800 09/07/21  0632   WBC 11.3* 12.8* 11.2*   HGB 8.2* 8.9* 8.5*    249 237       LFTs -   Recent Labs   Lab Test 09/04/21  0414 09/03/21  0523 09/02/21  1730   ALKPHOS 81 95 120   BILITOTAL 1.4* 2.0* 2.2*   ALT 29 31 40   AST 38 39 57*   PROTTOTAL 8.8 9.2* 10.7*   ALBUMIN 2.9* 2.3* 2.7*       Iron Panel -   Recent Labs   Lab Test 08/14/21 2055 07/19/21  0632   IRON 28* 31*   IRONSAT 21 43   JERRELL 2,186*  --            Current Medications:    sodium chloride 0.9%  250 mL Intravenous Once in dialysis/CRRT     sodium chloride 0.9%  300 mL Hemodialysis Machine Once     sodium bicarbonate  325 mg Per Feeding " Tube Q4H    And     amylase-lipase-protease  1-2 capsule Per Feeding Tube Q4H     amylase-lipase-protease  2 capsule Oral TID w/meals     B and C vitamin Complex with folic acid  5 mL Per Feeding Tube Daily     epoetin charles-epbx (RETACRIT) inj ESRD  5,000 Units Intravenous Once in dialysis/CRRT     fiber modular (NUTRISOURCE FIBER)  1 packet Per Feeding Tube TID     folic acid  1 mg Oral Daily     heparin (porcine)  500 Units Hemodialysis Machine OR IV Push Once in dialysis/CRRT     heparin lock flush  5-20 mL Intracatheter Q24H     melatonin  6 mg Oral At Bedtime     menthol   Transdermal Q8H     oxyCODONE  5 mg Oral Q8H     pantoprazole (PROTONIX) IV  40 mg Intravenous Daily with breakfast     protein modular  1 packet Per Feeding Tube Daily     sevelamer carbonate (RENVELA)  0.8 g Oral TID     sodium chloride (PF)  3 mL Intracatheter Q8H     sodium chloride (PF)  3 mL Intracatheter Q8H     sodium chloride (PF)  3 mL Intracatheter Q8H     sodium chloride (PF)  9 mL Intracatheter During Dialysis/CRRT (from stock)     sodium chloride (PF)  9 mL Intracatheter During Dialysis/CRRT (from stock)     sodium chloride (PF)  9 mL Intracatheter During Dialysis/CRRT (from stock)     vitamin B1  100 mg Oral Daily    Or     thiamine  100 mg Intravenous Daily       - MEDICATION INSTRUCTIONS -       dextrose       heparin (porcine)       heparin (porcine) 1,000 Units/hr (08/24/21 0907)

## 2021-09-12 LAB
HOLD SPECIMEN: NORMAL
MAGNESIUM SERPL-MCNC: 2 MG/DL (ref 1.6–2.3)

## 2021-09-12 PROCEDURE — 999N000044 HC STATISTIC CVC DRESSING CHANGE

## 2021-09-12 PROCEDURE — 250N000013 HC RX MED GY IP 250 OP 250 PS 637: Performed by: STUDENT IN AN ORGANIZED HEALTH CARE EDUCATION/TRAINING PROGRAM

## 2021-09-12 PROCEDURE — 250N000013 HC RX MED GY IP 250 OP 250 PS 637: Performed by: INTERNAL MEDICINE

## 2021-09-12 PROCEDURE — 250N000011 HC RX IP 250 OP 636: Performed by: INTERNAL MEDICINE

## 2021-09-12 PROCEDURE — 83735 ASSAY OF MAGNESIUM: CPT | Performed by: INTERNAL MEDICINE

## 2021-09-12 PROCEDURE — 99207 PR CDG-MDM COMPONENT: MEETS HIGH - UP CODED: CPT | Performed by: STUDENT IN AN ORGANIZED HEALTH CARE EDUCATION/TRAINING PROGRAM

## 2021-09-12 PROCEDURE — 36592 COLLECT BLOOD FROM PICC: CPT | Performed by: INTERNAL MEDICINE

## 2021-09-12 PROCEDURE — 999N000007 HC SITE CHECK

## 2021-09-12 PROCEDURE — 120N000002 HC R&B MED SURG/OB UMMC

## 2021-09-12 PROCEDURE — 99233 SBSQ HOSP IP/OBS HIGH 50: CPT | Mod: GC | Performed by: STUDENT IN AN ORGANIZED HEALTH CARE EDUCATION/TRAINING PROGRAM

## 2021-09-12 PROCEDURE — C9113 INJ PANTOPRAZOLE SODIUM, VIA: HCPCS | Performed by: INTERNAL MEDICINE

## 2021-09-12 RX ADMIN — HYDROMORPHONE HYDROCHLORIDE 1 MG: 1 SOLUTION ORAL at 20:59

## 2021-09-12 RX ADMIN — SODIUM BICARBONATE 325 MG: 325 TABLET ORAL at 02:12

## 2021-09-12 RX ADMIN — ONDANSETRON 4 MG: 2 INJECTION INTRAMUSCULAR; INTRAVENOUS at 17:07

## 2021-09-12 RX ADMIN — SODIUM BICARBONATE 325 MG: 325 TABLET ORAL at 21:42

## 2021-09-12 RX ADMIN — SODIUM BICARBONATE 325 MG: 325 TABLET ORAL at 11:25

## 2021-09-12 RX ADMIN — PANCRELIPASE 2 CAPSULE: 24000; 76000; 120000 CAPSULE, DELAYED RELEASE PELLETS ORAL at 02:12

## 2021-09-12 RX ADMIN — SEVELAMER CARBONATE 0.8 G: 800 POWDER, FOR SUSPENSION ORAL at 21:41

## 2021-09-12 RX ADMIN — Medication 1 PACKET: at 08:17

## 2021-09-12 RX ADMIN — OXYCODONE HYDROCHLORIDE 5 MG: 5 SOLUTION ORAL at 16:10

## 2021-09-12 RX ADMIN — HYDROXYZINE HYDROCHLORIDE 50 MG: 25 TABLET, FILM COATED ORAL at 13:16

## 2021-09-12 RX ADMIN — OXYCODONE HYDROCHLORIDE 5 MG: 5 SOLUTION ORAL at 00:18

## 2021-09-12 RX ADMIN — ONDANSETRON 4 MG: 4 TABLET, ORALLY DISINTEGRATING ORAL at 02:12

## 2021-09-12 RX ADMIN — SODIUM BICARBONATE 325 MG: 325 TABLET ORAL at 17:08

## 2021-09-12 RX ADMIN — PANCRELIPASE 2 CAPSULE: 24000; 76000; 120000 CAPSULE, DELAYED RELEASE PELLETS ORAL at 17:08

## 2021-09-12 RX ADMIN — FOLIC ACID 1 MG: 1 TABLET ORAL at 08:15

## 2021-09-12 RX ADMIN — SODIUM BICARBONATE 325 MG: 325 TABLET ORAL at 05:25

## 2021-09-12 RX ADMIN — HYDROMORPHONE HYDROCHLORIDE 1 MG: 1 SOLUTION ORAL at 17:07

## 2021-09-12 RX ADMIN — Medication 1 PACKET: at 08:16

## 2021-09-12 RX ADMIN — PANTOPRAZOLE SODIUM 40 MG: 40 INJECTION, POWDER, FOR SOLUTION INTRAVENOUS at 08:15

## 2021-09-12 RX ADMIN — Medication 1 PACKET: at 21:43

## 2021-09-12 RX ADMIN — Medication 5 ML: at 08:15

## 2021-09-12 RX ADMIN — PANCRELIPASE 2 CAPSULE: 24000; 76000; 120000 CAPSULE, DELAYED RELEASE PELLETS ORAL at 11:24

## 2021-09-12 RX ADMIN — THIAMINE HCL TAB 100 MG 100 MG: 100 TAB at 08:15

## 2021-09-12 RX ADMIN — BENZONATATE 100 MG: 100 CAPSULE ORAL at 01:00

## 2021-09-12 RX ADMIN — PANCRELIPASE 2 CAPSULE: 24000; 76000; 120000 CAPSULE, DELAYED RELEASE PELLETS ORAL at 21:41

## 2021-09-12 RX ADMIN — HYDROMORPHONE HYDROCHLORIDE 1 MG: 1 SOLUTION ORAL at 02:12

## 2021-09-12 RX ADMIN — OXYCODONE HYDROCHLORIDE 5 MG: 5 SOLUTION ORAL at 08:15

## 2021-09-12 RX ADMIN — SEVELAMER CARBONATE 0.8 G: 800 POWDER, FOR SUSPENSION ORAL at 08:15

## 2021-09-12 RX ADMIN — HYDROXYZINE HYDROCHLORIDE 50 MG: 25 TABLET, FILM COATED ORAL at 00:18

## 2021-09-12 RX ADMIN — ONDANSETRON 4 MG: 2 INJECTION INTRAMUSCULAR; INTRAVENOUS at 08:33

## 2021-09-12 RX ADMIN — HYDROMORPHONE HYDROCHLORIDE 1 MG: 1 SOLUTION ORAL at 11:25

## 2021-09-12 RX ADMIN — Medication 10 ML: at 16:11

## 2021-09-12 RX ADMIN — PANCRELIPASE 2 CAPSULE: 24000; 76000; 120000 CAPSULE, DELAYED RELEASE PELLETS ORAL at 05:25

## 2021-09-12 ASSESSMENT — ACTIVITIES OF DAILY LIVING (ADL)
ADLS_ACUITY_SCORE: 17

## 2021-09-12 ASSESSMENT — MIFFLIN-ST. JEOR
SCORE: 1451.14
SCORE: 1457.75

## 2021-09-12 NOTE — PLAN OF CARE
ASSUMED CARES: [id number]  STATUS: Pt admitted 6/28 for Severe malnutrition. ESRD - HD (TTS).  NEURO: A/o x 4. Forgetful at times as well as demanding at times with multiple requests.   VS: Soft BP's. All other VSS on 2L per pt request.   ACTIVITY: A1 with in bed activities. Mech lift. Q2H turn.   PAIN: C/o pain in back and abdomen. PRN Dilaudid given x 2. Scheduled Oxy given per order.   CARDIAC: Tachy. No C/o CP.   RESP: Elevated RR at times. +Phlegm. +Cough- Tessalon given x 1.   GI/: Small amount of urine made- About 10 ml. BM x 1.   DIET: Regular. TF at goal rate 60 ml/hr.   SKIN: Jaundiced. Bruises.   LDA'S: L TL PICC. PEG- Gtube to gravity. R CVC  LABS: To be drawn this AM  CHANGES THIS SHIFT: Increased phlegm this shift per pt- Pt able to cough up and spit out.   POC: Cont with POC. Plan for discharge to TCU once safe dispo and bed avail. Call light within reach.

## 2021-09-12 NOTE — PROGRESS NOTES
[facility]    Progress Note - Daniela 3 Service        Date of Admission:  6/28/2021    Assessment & Plan             Dax Villareal is a 30 year old male with hx of alcohol use disorder admitted on 6/28/2021 after recent prolonged admission at [facility] in [place] who has severe acute alcoholic hepatitis c/b HE, ESRD requiring HD, and malnutrition in the setting of acute necrotizing pancreatitis, s/p PEG-J placement on 7/13. He arrived with acute respiratory failure and septic shock that have resolved, and his secondary bacterial peritonitis has been appropriately treated. He requires continued management for ESRD, hepatic encephalopathy, infected pancreatic pseudocyst, nutritional support via PEG-J, and persistent right pleural effusion. Clinically improving after multiple necrosectomies. Increasing leukocytosis and hypotension on 9/2 prompting resumption of IV antibiotics - has been stable since that time. Repeat CT with interval improvement in fluid collections.     Today:   - Abdominal pain relieved with pain medication. Will monitor and reassess for need to image.   - No dialysis today. Will touch base with nephrology Monday to determine dry weight.   - Will need to discuss with social work on Monday for disposition planning      #Septic Shock, resolved  #Necrotizing alcoholic pancreatitis w/ infected pseudocyst, improving   #Hepatic Fluid collection, stable to improving  #Secondary Bacterial Peritonitis 2/2 infected pseudocyst with VRE, improving  Admitted to the [facility] ICU on 6/28/2021 from [facility] in [place] for septic shock due to necrotizing pancreatitis. CT on admit showing mature collection with enhancing wall measuring ~[id number]. IR placed perc drain 6/29. Repeat imaging 7/18 revealed walled off necrotic collection amenable to endoscopic transluminal drainage with cystgastrostomy stenting with necrosectomy on 7/21/21. Complete necrosectomy on  8/11. Antibiotics (cefepime and metronidazole) were discontinued on 8/25 after left flank drain removed, re-initiated in the setting of fever on 9/2. Favor abdominal etiology, repeat CT on 9/7 with interval improvement in fluid collections - will complete 7d course of abx per ID with EOT 9/8.   - Will likely need intermittent antibiotics with fluid collections occasionally with bacterial growth moving forward as to be expected in nec panc      # Hypovolemia  Patient with substantial daily NG output and poor PO. Also note significant weight loss over the past few weeks. May need intermittent boluses of normal saline for euvolemia.   - Admission weight 67kg. Now 58.5kg. Concern for weight loss vs. Targeting dry weight.              - D/W Nephrology about concern. Asked to re-evaluate volume status vs. weight  -Monitor vitals and output post dialysis, consider fluids prn.     #Gastric Outlet Obstruction  High G tube output due to the cystogastric connection/fluid drainage noted on 8/5. Per GI, possibly related to gastric outlet from severe abdominal inflammation and is expected. KUB negative for obstructive gas pattern. Continues to have mild to moderate NG output - stable to improved.       #Hyponatremia, stable  Patient with down-trending sodium the week of 8/27; likely due to substantial NG output and low solute intake and hepatic dysfunction. Stable 127-128.     #Right Pancreatic Hydrothorax, stable  Increased dyspnea with CXR on 7/19 with further imaging and thoracentesis confirming a hepatic/pancreatic hydrothorax. Thoracentesis PRN for dyspnea, most recently 9/3.     #Alcoholic Hepatitis  #Coagulation Defect  Received 1 week of steroids at [facility] for elevated INR and altered mental status. Not a liver transplant candidate. Mental status improved with aggressive lactulose and rifaxamin, which was discontinued without return of AMS.     ESRD due to ATN  Hyperphosphatemia  Stage III RADHA due to ATN from septic  shock without recovery for >3 months. Line is TDC from PTA. Nephrology dialyzed on Tu, Th, Sa schedule.  - Will need follow-up and outpatient dialysis  - Sevelemer powder TID with TF for hyperphosphatemia      #Anemia due to Chronic Illness  #Hematochezia, resolved  #Hemoperitoneum  Baseline hgb 17. Running between 6-8 in setting of chronic illness, new ESRD with epocrit per Nephrology, frequent lab draws and procedures. Hematochezia on 8/8 after starting standard intensity heparin, now resolved. Paracentesis on 8/21 with hemoperitoneum. No active bleeding. Required a unit of PRBC on 9/4 in the setting of several fluid boluses.   - Goal hgb > 7.0, trend Hgb  - Hgb has been stable.      #Alcohol Use Disorder  Counseled patient on complete alcohol cessation. Not interested in additional services at this time. Continue daily folic acid and thiamine.     #Lactic Acidosis  Intermittent Lactic acidosis in the setting of sepsis, ESRD, bleeding.     #Hypokalemia  Noted in the setting of high gastric output and improved once G-tube output decreased. CTM.      #Severe Malnutrition  Poor oral intake in the setting of necrotizing pancreatitis. PEG tube placed 7/13. Dietitian consulted and continuous tube feeds with goal of 65 ml/hr with continuous pancreatic enzymes  - Continuous tube feeds 50 mL overnight.       Diet: Regular Diet Adult  Adult Formula Drip Feeding: Continuous Vital 1.5; Jejunostomy; Goal Rate: 60; mL/hr; Medication - Feeding Tube Flush Frequency: At least 15-30 mL water before and after medication administration and with tube clogging; Amount to Send (Nutrition us...    DVT Prophylaxis: Pneumatic Compression Devices  Rdz Catheter: Not present  Fluids: None   Central Lines: PRESENT  PICC Triple Lumen 06/28/21 Left-Site Assessment: WDL  CVC Double Lumen Right-Site Assessment: WDL  Code Status: Full Code      Disposition Plan   Expected discharge: 09/13/2021   recommended to transitional care unit once safe  disposition plan/ TCU bed available.     The patient's care was discussed with the Attending Physician, Dr. Gresham, Bedside Nurse and Patient.    Tri Lozada MD  [address]  Securely message with the Vocera Web Console (learn more here)  Text page via [facility] Paging/Directory  Please see sign in/sign out for up to date coverage information    Clinically Significant Risk Factors Present on Admission                ______________________________________________________________________    Interval History   Dax reports 8/10 abdominal pain located just to the right of his G tube. He denies nausea. He reports that it came on after a rather active dream. He denies fevers/chills, diarrhea, nausea/emesis. It is a dull and constant pain. He denies difficulty breathing or cough. Upon reassessment after pain medications, he endorses improvement in his pain and that he is now only tired.     Data reviewed today: I reviewed all medications, new labs and imaging results over the last 24 hours. I personally reviewed no images or EKG's today.    Physical Exam   Vital Signs: Temp: 97.8  F (36.6  C) Temp src: Oral BP: 115/60 Pulse: 103   Resp: 14 SpO2: 96 % O2 Device: Nasal cannula Oxygen Delivery: 3 LPM  Weight: 123 lbs 7.32 oz  Physical Exam  Vitals and nursing note reviewed.   Constitutional:       Comments: Mild distress     HENT:      Mouth/Throat:      Mouth: Mucous membranes are moist.   Eyes:      Conjunctiva/sclera: Conjunctivae normal.      Pupils: Pupils are equal, round, and reactive to light.   Cardiovascular:      Rate and Rhythm: Normal rate and regular rhythm.      Heart sounds: Normal heart sounds.   Pulmonary:      Effort: Pulmonary effort is normal.      Breath sounds: Normal breath sounds.   Abdominal:      General: Bowel sounds are normal.      Palpations: Abdomen is soft.      Tenderness: There is abdominal tenderness. There is no guarding.    Musculoskeletal:      Right lower leg: No edema.      Left lower leg: No edema.      Comments: Contractures of feet   Skin:     General: Skin is warm and dry.      Findings: No rash.   Neurological:      Mental Status: He is alert. Mental status is at baseline.         Data   Recent Labs   Lab 09/11/21  2151 09/11/21  0609 09/10/21  0741 09/09/21  0800 09/07/21  0632   WBC  --   --  11.3* 12.8* 11.2*   HGB  --   --  8.2* 8.9* 8.5*   MCV  --   --  95 100 96   PLT  --   --  230 249 237   NA  --  128*  --  127* 128*   POTASSIUM  --  4.6  --  4.1 3.7   CHLORIDE  --  95  --  92* 95   CO2  --  22  --  22 20   BUN  --  24  --  27 35*   CR  --  2.56*  --  2.47* 2.76*   ANIONGAP  --  11  --  13 13   JANENE  --  9.1  --  9.1 8.6   GLC 98 123*  --  145* 138*     No results found for this or any previous visit (from the past 24 hour(s)).  Medications     - MEDICATION INSTRUCTIONS -       dextrose       heparin (porcine) 1,000 Units/hr (08/24/21 0907)       sodium bicarbonate  325 mg Per Feeding Tube Q4H    And     amylase-lipase-protease  1-2 capsule Per Feeding Tube Q4H     amylase-lipase-protease  2 capsule Oral TID w/meals     B and C vitamin Complex with folic acid  5 mL Per Feeding Tube Daily     fiber modular (NUTRISOURCE FIBER)  1 packet Per Feeding Tube TID     folic acid  1 mg Oral Daily     heparin lock flush  5-20 mL Intracatheter Q24H     melatonin  6 mg Oral At Bedtime     menthol   Transdermal Q8H     oxyCODONE  5 mg Oral Q8H     pantoprazole (PROTONIX) IV  40 mg Intravenous Daily with breakfast     protein modular  1 packet Per Feeding Tube Daily     sevelamer carbonate (RENVELA)  0.8 g Oral TID     sodium chloride (PF)  3 mL Intracatheter Q8H     sodium chloride (PF)  3 mL Intracatheter Q8H     sodium chloride (PF)  3 mL Intracatheter Q8H     vitamin B1  100 mg Oral Daily    Or     thiamine  100 mg Intravenous Daily

## 2021-09-12 NOTE — PLAN OF CARE
Reason for Admission: Severe malnutrition, AHRF in presence of liver failure.      Status: Respiratory status improved although tachypneic at times, fluid status unchanged.      RN assumed cares at 0700     Vitals: WDL  Pain: Pt reports pain in the back and abdomen relieved with oral hydromorphone concentrate and oxycodone solution.   Neuro: Alert and oriented, however withdrawn and forgetful.   Cardiac: Tachycardic  Respiratory: Tachypneic at times  GI/: Loose stool this afternoon. Anuric on hemodialysis.   IV/Drains: Left PICC heparin locked dressing changed this evening per vascular. Single lumen TKO  Activity: Up with lift assist.   Skin: Jaundice and yadi.   Labs: WDL, Needs dialysis.      Plan of Care:  RN assumed cares at 0700, Pt alert and oriented, VS stable throughout the shift with some baseline hypotension and tachycardia/tachypnea.  Pt reports pain in the abdomen and back relieved with scheduled oxycodone and PRN hydromorphone.  Continues with tube feeds at goal rate of 60; paused this evening 2/2 nausea. About 400 mL emesis this evening.  G tube to gravity drainage.  PEG site wound cares completed.  No acute incidents this shift.  Continue to monitor and notify MD of any changes.

## 2021-09-13 ENCOUNTER — ANESTHESIA EVENT (OUTPATIENT)
Dept: SURGERY | Facility: CLINIC | Age: 31
End: 2021-09-13
Payer: MEDICAID

## 2021-09-13 ENCOUNTER — ANESTHESIA (OUTPATIENT)
Dept: SURGERY | Facility: CLINIC | Age: 31
End: 2021-09-13
Payer: MEDICAID

## 2021-09-13 ENCOUNTER — APPOINTMENT (OUTPATIENT)
Dept: GENERAL RADIOLOGY | Facility: CLINIC | Age: 31
End: 2021-09-13
Attending: INTERNAL MEDICINE
Payer: MEDICAID

## 2021-09-13 ENCOUNTER — APPOINTMENT (OUTPATIENT)
Dept: CT IMAGING | Facility: CLINIC | Age: 31
End: 2021-09-13
Attending: INTERNAL MEDICINE
Payer: MEDICAID

## 2021-09-13 LAB
ANION GAP SERPL CALCULATED.3IONS-SCNC: 9 MMOL/L (ref 3–14)
BUN SERPL-MCNC: 51 MG/DL (ref 7–30)
CALCIUM SERPL-MCNC: 9.9 MG/DL (ref 8.5–10.1)
CHLORIDE BLD-SCNC: 94 MMOL/L (ref 94–109)
CO2 SERPL-SCNC: 22 MMOL/L (ref 20–32)
CREAT SERPL-MCNC: 4.59 MG/DL (ref 0.66–1.25)
CRP SERPL-MCNC: 45 MG/L (ref 0–8)
ERYTHROCYTE [DISTWIDTH] IN BLOOD BY AUTOMATED COUNT: 15.7 % (ref 10–15)
GFR SERPL CREATININE-BSD FRML MDRD: 16 ML/MIN/1.73M2
GLUCOSE BLD-MCNC: 84 MG/DL (ref 70–99)
GLUCOSE BLDC GLUCOMTR-MCNC: 81 MG/DL (ref 70–99)
HCT VFR BLD AUTO: 25.9 % (ref 40–53)
HGB BLD-MCNC: 8.2 G/DL (ref 13.3–17.7)
MAGNESIUM SERPL-MCNC: 2.2 MG/DL (ref 1.6–2.3)
MCH RBC QN AUTO: 30.5 PG (ref 26.5–33)
MCHC RBC AUTO-ENTMCNC: 31.7 G/DL (ref 31.5–36.5)
MCV RBC AUTO: 96 FL (ref 78–100)
PHOSPHATE SERPL-MCNC: 9 MG/DL (ref 2.5–4.5)
PLATELET # BLD AUTO: 266 10E3/UL (ref 150–450)
POTASSIUM BLD-SCNC: 5.9 MMOL/L (ref 3.4–5.3)
RBC # BLD AUTO: 2.69 10E6/UL (ref 4.4–5.9)
SODIUM SERPL-SCNC: 125 MMOL/L (ref 133–144)
UPPER GI ENDOSCOPY: NORMAL
WBC # BLD AUTO: 11.5 10E3/UL (ref 4–11)

## 2021-09-13 PROCEDURE — 250N000011 HC RX IP 250 OP 636

## 2021-09-13 PROCEDURE — 250N000013 HC RX MED GY IP 250 OP 250 PS 637: Performed by: STUDENT IN AN ORGANIZED HEALTH CARE EDUCATION/TRAINING PROGRAM

## 2021-09-13 PROCEDURE — 634N000001 HC RX 634: Performed by: CLINICAL NURSE SPECIALIST

## 2021-09-13 PROCEDURE — 272N000002 HC OR SUPPLY OTHER OPNP: Performed by: INTERNAL MEDICINE

## 2021-09-13 PROCEDURE — 74177 CT ABD & PELVIS W/CONTRAST: CPT | Mod: 26 | Performed by: RADIOLOGY

## 2021-09-13 PROCEDURE — 99233 SBSQ HOSP IP/OBS HIGH 50: CPT | Mod: GC | Performed by: STUDENT IN AN ORGANIZED HEALTH CARE EDUCATION/TRAINING PROGRAM

## 2021-09-13 PROCEDURE — 370N000017 HC ANESTHESIA TECHNICAL FEE, PER MIN: Performed by: INTERNAL MEDICINE

## 2021-09-13 PROCEDURE — 999N000141 HC STATISTIC PRE-PROCEDURE NURSING ASSESSMENT: Performed by: INTERNAL MEDICINE

## 2021-09-13 PROCEDURE — 272N000001 HC OR GENERAL SUPPLY STERILE: Performed by: INTERNAL MEDICINE

## 2021-09-13 PROCEDURE — 710N000010 HC RECOVERY PHASE 1, LEVEL 2, PER MIN: Performed by: INTERNAL MEDICINE

## 2021-09-13 PROCEDURE — 250N000011 HC RX IP 250 OP 636: Performed by: STUDENT IN AN ORGANIZED HEALTH CARE EDUCATION/TRAINING PROGRAM

## 2021-09-13 PROCEDURE — C1894 INTRO/SHEATH, NON-LASER: HCPCS | Performed by: INTERNAL MEDICINE

## 2021-09-13 PROCEDURE — 0DP08YZ REMOVAL OF OTHER DEVICE FROM UPPER INTESTINAL TRACT, VIA NATURAL OR ARTIFICIAL OPENING ENDOSCOPIC: ICD-10-PCS | Performed by: INTERNAL MEDICINE

## 2021-09-13 PROCEDURE — 999N000179 XR SURGERY CARM FLUORO LESS THAN 5 MIN W STILLS: Mod: TC

## 2021-09-13 PROCEDURE — 86140 C-REACTIVE PROTEIN: CPT | Performed by: INTERNAL MEDICINE

## 2021-09-13 PROCEDURE — 80048 BASIC METABOLIC PNL TOTAL CA: CPT | Performed by: STUDENT IN AN ORGANIZED HEALTH CARE EDUCATION/TRAINING PROGRAM

## 2021-09-13 PROCEDURE — 36592 COLLECT BLOOD FROM PICC: CPT | Performed by: STUDENT IN AN ORGANIZED HEALTH CARE EDUCATION/TRAINING PROGRAM

## 2021-09-13 PROCEDURE — 258N000003 HC RX IP 258 OP 636: Performed by: STUDENT IN AN ORGANIZED HEALTH CARE EDUCATION/TRAINING PROGRAM

## 2021-09-13 PROCEDURE — 250N000011 HC RX IP 250 OP 636: Performed by: INTERNAL MEDICINE

## 2021-09-13 PROCEDURE — 258N000003 HC RX IP 258 OP 636: Performed by: INTERNAL MEDICINE

## 2021-09-13 PROCEDURE — 74177 CT ABD & PELVIS W/CONTRAST: CPT

## 2021-09-13 PROCEDURE — G0463 HOSPITAL OUTPT CLINIC VISIT: HCPCS

## 2021-09-13 PROCEDURE — C9113 INJ PANTOPRAZOLE SODIUM, VIA: HCPCS | Performed by: INTERNAL MEDICINE

## 2021-09-13 PROCEDURE — 250N000009 HC RX 250: Performed by: STUDENT IN AN ORGANIZED HEALTH CARE EDUCATION/TRAINING PROGRAM

## 2021-09-13 PROCEDURE — 250N000025 HC SEVOFLURANE, PER MIN: Performed by: INTERNAL MEDICINE

## 2021-09-13 PROCEDURE — 258N000003 HC RX IP 258 OP 636: Performed by: CLINICAL NURSE SPECIALIST

## 2021-09-13 PROCEDURE — 90937 HEMODIALYSIS REPEATED EVAL: CPT

## 2021-09-13 PROCEDURE — 250N000013 HC RX MED GY IP 250 OP 250 PS 637: Performed by: INTERNAL MEDICINE

## 2021-09-13 PROCEDURE — 83735 ASSAY OF MAGNESIUM: CPT | Performed by: INTERNAL MEDICINE

## 2021-09-13 PROCEDURE — 99233 SBSQ HOSP IP/OBS HIGH 50: CPT | Performed by: INTERNAL MEDICINE

## 2021-09-13 PROCEDURE — 258N000003 HC RX IP 258 OP 636: Performed by: ANESTHESIOLOGY

## 2021-09-13 PROCEDURE — C1769 GUIDE WIRE: HCPCS | Performed by: INTERNAL MEDICINE

## 2021-09-13 PROCEDURE — 250N000011 HC RX IP 250 OP 636: Performed by: CLINICAL NURSE SPECIALIST

## 2021-09-13 PROCEDURE — 85027 COMPLETE CBC AUTOMATED: CPT | Performed by: STUDENT IN AN ORGANIZED HEALTH CARE EDUCATION/TRAINING PROGRAM

## 2021-09-13 PROCEDURE — 120N000002 HC R&B MED SURG/OB UMMC

## 2021-09-13 PROCEDURE — 84100 ASSAY OF PHOSPHORUS: CPT | Performed by: STUDENT IN AN ORGANIZED HEALTH CARE EDUCATION/TRAINING PROGRAM

## 2021-09-13 PROCEDURE — 360N000075 HC SURGERY LEVEL 2, PER MIN: Performed by: INTERNAL MEDICINE

## 2021-09-13 RX ORDER — OXYCODONE HYDROCHLORIDE 5 MG/1
5 TABLET ORAL EVERY 4 HOURS PRN
Status: DISCONTINUED | OUTPATIENT
Start: 2021-09-13 | End: 2021-09-13 | Stop reason: HOSPADM

## 2021-09-13 RX ORDER — ONDANSETRON 4 MG/1
4 TABLET, ORALLY DISINTEGRATING ORAL EVERY 30 MIN PRN
Status: DISCONTINUED | OUTPATIENT
Start: 2021-09-13 | End: 2021-09-13 | Stop reason: HOSPADM

## 2021-09-13 RX ORDER — EPHEDRINE SULFATE 50 MG/ML
INJECTION, SOLUTION INTRAMUSCULAR; INTRAVENOUS; SUBCUTANEOUS PRN
Status: DISCONTINUED | OUTPATIENT
Start: 2021-09-13 | End: 2021-09-13

## 2021-09-13 RX ORDER — HEPARIN SODIUM 1000 [USP'U]/ML
500 INJECTION, SOLUTION INTRAVENOUS; SUBCUTANEOUS CONTINUOUS
Status: DISCONTINUED | OUTPATIENT
Start: 2021-09-13 | End: 2021-09-13

## 2021-09-13 RX ORDER — METOPROLOL TARTRATE 1 MG/ML
1-2 INJECTION, SOLUTION INTRAVENOUS EVERY 5 MIN PRN
Status: DISCONTINUED | OUTPATIENT
Start: 2021-09-13 | End: 2021-09-13 | Stop reason: HOSPADM

## 2021-09-13 RX ORDER — SODIUM CHLORIDE, SODIUM LACTATE, POTASSIUM CHLORIDE, CALCIUM CHLORIDE 600; 310; 30; 20 MG/100ML; MG/100ML; MG/100ML; MG/100ML
INJECTION, SOLUTION INTRAVENOUS CONTINUOUS
Status: DISCONTINUED | OUTPATIENT
Start: 2021-09-13 | End: 2021-09-13 | Stop reason: HOSPADM

## 2021-09-13 RX ORDER — HYDROMORPHONE HCL IN WATER/PF 6 MG/30 ML
0.2 PATIENT CONTROLLED ANALGESIA SYRINGE INTRAVENOUS
Status: COMPLETED | OUTPATIENT
Start: 2021-09-13 | End: 2021-09-13

## 2021-09-13 RX ORDER — PROPOFOL 10 MG/ML
INJECTION, EMULSION INTRAVENOUS PRN
Status: DISCONTINUED | OUTPATIENT
Start: 2021-09-13 | End: 2021-09-13

## 2021-09-13 RX ORDER — LIDOCAINE HYDROCHLORIDE 20 MG/ML
INJECTION, SOLUTION INFILTRATION; PERINEURAL PRN
Status: DISCONTINUED | OUTPATIENT
Start: 2021-09-13 | End: 2021-09-13

## 2021-09-13 RX ORDER — LIDOCAINE 40 MG/G
CREAM TOPICAL
Status: DISCONTINUED | OUTPATIENT
Start: 2021-09-13 | End: 2021-09-13 | Stop reason: HOSPADM

## 2021-09-13 RX ORDER — HEPARIN SODIUM 1000 [USP'U]/ML
500 INJECTION, SOLUTION INTRAVENOUS; SUBCUTANEOUS
Status: COMPLETED | OUTPATIENT
Start: 2021-09-13 | End: 2021-09-13

## 2021-09-13 RX ORDER — DEXAMETHASONE SODIUM PHOSPHATE 4 MG/ML
INJECTION, SOLUTION INTRA-ARTICULAR; INTRALESIONAL; INTRAMUSCULAR; INTRAVENOUS; SOFT TISSUE PRN
Status: DISCONTINUED | OUTPATIENT
Start: 2021-09-13 | End: 2021-09-13

## 2021-09-13 RX ORDER — FENTANYL CITRATE 50 UG/ML
INJECTION, SOLUTION INTRAMUSCULAR; INTRAVENOUS PRN
Status: DISCONTINUED | OUTPATIENT
Start: 2021-09-13 | End: 2021-09-13

## 2021-09-13 RX ORDER — HYDROMORPHONE HCL IN WATER/PF 6 MG/30 ML
0.2 PATIENT CONTROLLED ANALGESIA SYRINGE INTRAVENOUS EVERY 5 MIN PRN
Status: DISCONTINUED | OUTPATIENT
Start: 2021-09-13 | End: 2021-09-13 | Stop reason: HOSPADM

## 2021-09-13 RX ORDER — IOPAMIDOL 755 MG/ML
74 INJECTION, SOLUTION INTRAVASCULAR ONCE
Status: COMPLETED | OUTPATIENT
Start: 2021-09-13 | End: 2021-09-13

## 2021-09-13 RX ORDER — FENTANYL CITRATE 50 UG/ML
25 INJECTION, SOLUTION INTRAMUSCULAR; INTRAVENOUS EVERY 5 MIN PRN
Status: DISCONTINUED | OUTPATIENT
Start: 2021-09-13 | End: 2021-09-13 | Stop reason: HOSPADM

## 2021-09-13 RX ORDER — CEFAZOLIN SODIUM 2 G/100ML
INJECTION, SOLUTION INTRAVENOUS PRN
Status: DISCONTINUED | OUTPATIENT
Start: 2021-09-13 | End: 2021-09-13

## 2021-09-13 RX ORDER — ONDANSETRON 2 MG/ML
4 INJECTION INTRAMUSCULAR; INTRAVENOUS EVERY 30 MIN PRN
Status: DISCONTINUED | OUTPATIENT
Start: 2021-09-13 | End: 2021-09-13 | Stop reason: HOSPADM

## 2021-09-13 RX ORDER — HYDRALAZINE HYDROCHLORIDE 20 MG/ML
2.5-5 INJECTION INTRAMUSCULAR; INTRAVENOUS EVERY 10 MIN PRN
Status: DISCONTINUED | OUTPATIENT
Start: 2021-09-13 | End: 2021-09-13 | Stop reason: HOSPADM

## 2021-09-13 RX ORDER — HYDROMORPHONE HCL IN WATER/PF 6 MG/30 ML
PATIENT CONTROLLED ANALGESIA SYRINGE INTRAVENOUS
Status: COMPLETED
Start: 2021-09-13 | End: 2021-09-13

## 2021-09-13 RX ADMIN — Medication 10 MG: at 17:58

## 2021-09-13 RX ADMIN — PHENYLEPHRINE HYDROCHLORIDE 100 MCG: 10 INJECTION INTRAVENOUS at 17:41

## 2021-09-13 RX ADMIN — PHENYLEPHRINE HYDROCHLORIDE 0.5 MCG/KG/MIN: 10 INJECTION INTRAVENOUS at 17:58

## 2021-09-13 RX ADMIN — SODIUM CHLORIDE, POTASSIUM CHLORIDE, SODIUM LACTATE AND CALCIUM CHLORIDE: 600; 310; 30; 20 INJECTION, SOLUTION INTRAVENOUS at 17:36

## 2021-09-13 RX ADMIN — Medication 5 MG: at 18:39

## 2021-09-13 RX ADMIN — PANTOPRAZOLE SODIUM 40 MG: 40 INJECTION, POWDER, FOR SOLUTION INTRAVENOUS at 15:20

## 2021-09-13 RX ADMIN — PHENYLEPHRINE HYDROCHLORIDE 200 MCG: 10 INJECTION INTRAVENOUS at 18:08

## 2021-09-13 RX ADMIN — ROCURONIUM BROMIDE 50 MG: 10 INJECTION INTRAVENOUS at 17:47

## 2021-09-13 RX ADMIN — PHENYLEPHRINE HYDROCHLORIDE 100 MCG: 10 INJECTION INTRAVENOUS at 18:50

## 2021-09-13 RX ADMIN — HYDROMORPHONE HYDROCHLORIDE 1 MG: 1 SOLUTION ORAL at 22:31

## 2021-09-13 RX ADMIN — PHENYLEPHRINE HYDROCHLORIDE 100 MCG: 10 INJECTION INTRAVENOUS at 18:20

## 2021-09-13 RX ADMIN — OXYCODONE HYDROCHLORIDE 5 MG: 5 SOLUTION ORAL at 15:16

## 2021-09-13 RX ADMIN — HYDROXYZINE HYDROCHLORIDE 50 MG: 25 TABLET, FILM COATED ORAL at 22:24

## 2021-09-13 RX ADMIN — PHENYLEPHRINE HYDROCHLORIDE 100 MCG: 10 INJECTION INTRAVENOUS at 17:44

## 2021-09-13 RX ADMIN — DEXAMETHASONE SODIUM PHOSPHATE 4 MG: 4 INJECTION, SOLUTION INTRA-ARTICULAR; INTRALESIONAL; INTRAMUSCULAR; INTRAVENOUS; SOFT TISSUE at 18:12

## 2021-09-13 RX ADMIN — HEPARIN SODIUM 500 UNITS/HR: 1000 INJECTION INTRAVENOUS; SUBCUTANEOUS at 09:50

## 2021-09-13 RX ADMIN — PHENYLEPHRINE HYDROCHLORIDE 200 MCG: 10 INJECTION INTRAVENOUS at 17:54

## 2021-09-13 RX ADMIN — SODIUM CHLORIDE 250 ML: 9 INJECTION, SOLUTION INTRAVENOUS at 09:46

## 2021-09-13 RX ADMIN — Medication 10 MG: at 18:15

## 2021-09-13 RX ADMIN — Medication 1 MG: at 17:51

## 2021-09-13 RX ADMIN — PROPOFOL 150 MG: 10 INJECTION, EMULSION INTRAVENOUS at 17:47

## 2021-09-13 RX ADMIN — SEVELAMER CARBONATE 0.8 G: 800 POWDER, FOR SUSPENSION ORAL at 21:23

## 2021-09-13 RX ADMIN — OXYCODONE HYDROCHLORIDE 5 MG: 5 SOLUTION ORAL at 04:28

## 2021-09-13 RX ADMIN — FENTANYL CITRATE 25 MCG: 50 INJECTION, SOLUTION INTRAMUSCULAR; INTRAVENOUS at 19:43

## 2021-09-13 RX ADMIN — EPOETIN ALFA-EPBX 5000 UNITS: 10000 INJECTION, SOLUTION INTRAVENOUS; SUBCUTANEOUS at 09:48

## 2021-09-13 RX ADMIN — FENTANYL CITRATE 25 MCG: 50 INJECTION, SOLUTION INTRAMUSCULAR; INTRAVENOUS at 19:28

## 2021-09-13 RX ADMIN — PHENYLEPHRINE HYDROCHLORIDE 100 MCG: 10 INJECTION INTRAVENOUS at 18:58

## 2021-09-13 RX ADMIN — PHENYLEPHRINE HYDROCHLORIDE 100 MCG: 10 INJECTION INTRAVENOUS at 18:39

## 2021-09-13 RX ADMIN — HEPARIN SODIUM 500 UNITS: 1000 INJECTION INTRAVENOUS; SUBCUTANEOUS at 09:48

## 2021-09-13 RX ADMIN — Medication 2 G: at 18:01

## 2021-09-13 RX ADMIN — PHENYLEPHRINE HYDROCHLORIDE 200 MCG: 10 INJECTION INTRAVENOUS at 18:03

## 2021-09-13 RX ADMIN — FENTANYL CITRATE 25 MCG: 50 INJECTION, SOLUTION INTRAMUSCULAR; INTRAVENOUS at 19:33

## 2021-09-13 RX ADMIN — FENTANYL CITRATE 100 MCG: 50 INJECTION, SOLUTION INTRAMUSCULAR; INTRAVENOUS at 17:36

## 2021-09-13 RX ADMIN — Medication 30 MG: at 18:08

## 2021-09-13 RX ADMIN — LIDOCAINE HYDROCHLORIDE 100 MG: 20 INJECTION, SOLUTION INFILTRATION; PERINEURAL at 17:47

## 2021-09-13 RX ADMIN — SODIUM CHLORIDE 300 ML: 9 INJECTION, SOLUTION INTRAVENOUS at 09:46

## 2021-09-13 RX ADMIN — PHENYLEPHRINE HYDROCHLORIDE 200 MCG: 10 INJECTION INTRAVENOUS at 17:58

## 2021-09-13 RX ADMIN — FENTANYL CITRATE 25 MCG: 50 INJECTION, SOLUTION INTRAMUSCULAR; INTRAVENOUS at 19:38

## 2021-09-13 RX ADMIN — HYDROMORPHONE HYDROCHLORIDE 0.2 MG: 1 INJECTION, SOLUTION INTRAMUSCULAR; INTRAVENOUS; SUBCUTANEOUS at 19:40

## 2021-09-13 RX ADMIN — HYDROMORPHONE HYDROCHLORIDE 0.2 MG: 0.2 INJECTION, SOLUTION INTRAMUSCULAR; INTRAVENOUS; SUBCUTANEOUS at 00:50

## 2021-09-13 RX ADMIN — SODIUM BICARBONATE 325 MG: 325 TABLET ORAL at 21:23

## 2021-09-13 RX ADMIN — HYDROMORPHONE HYDROCHLORIDE 0.2 MG: 1 INJECTION, SOLUTION INTRAMUSCULAR; INTRAVENOUS; SUBCUTANEOUS at 19:45

## 2021-09-13 RX ADMIN — HYDROMORPHONE HYDROCHLORIDE 1 MG: 1 SOLUTION ORAL at 06:52

## 2021-09-13 RX ADMIN — IOPAMIDOL 74 ML: 755 INJECTION, SOLUTION INTRAVENOUS at 14:01

## 2021-09-13 RX ADMIN — HYDROMORPHONE HYDROCHLORIDE 0.2 MG: 0.2 INJECTION, SOLUTION INTRAMUSCULAR; INTRAVENOUS; SUBCUTANEOUS at 11:10

## 2021-09-13 RX ADMIN — ONDANSETRON 4 MG: 2 INJECTION INTRAMUSCULAR; INTRAVENOUS at 18:57

## 2021-09-13 RX ADMIN — CYCLOBENZAPRINE 10 MG: 10 TABLET, FILM COATED ORAL at 22:31

## 2021-09-13 RX ADMIN — HYDROMORPHONE HYDROCHLORIDE 1 MG: 1 SOLUTION ORAL at 02:58

## 2021-09-13 RX ADMIN — FENTANYL CITRATE 25 MCG: 50 INJECTION, SOLUTION INTRAMUSCULAR; INTRAVENOUS at 19:13

## 2021-09-13 RX ADMIN — PANCRELIPASE 2 CAPSULE: 24000; 76000; 120000 CAPSULE, DELAYED RELEASE PELLETS ORAL at 21:23

## 2021-09-13 RX ADMIN — SUGAMMADEX 200 MG: 100 INJECTION, SOLUTION INTRAVENOUS at 18:57

## 2021-09-13 ASSESSMENT — ACTIVITIES OF DAILY LIVING (ADL)
ADLS_ACUITY_SCORE: 17

## 2021-09-13 ASSESSMENT — ENCOUNTER SYMPTOMS: STRIDOR: 0

## 2021-09-13 ASSESSMENT — LIFESTYLE VARIABLES: TOBACCO_USE: 1

## 2021-09-13 ASSESSMENT — MIFFLIN-ST. JEOR: SCORE: 1447.75

## 2021-09-13 NOTE — PLAN OF CARE
ASSUMED CARES: [id number]  STATUS: Pt admitted 6/28 for Severe malnutrition. ESRD - HD (TTS).  NEURO: A/o x 4. Forgetful at times.   VS: Soft BP's. All other VSS on 2L per pt request.   ACTIVITY: A1 with in bed activities. Mech lift. Turned as tolerated.   PAIN: C/o pain in back and abdomen- Near G tube. PRN PO Dilaudid given x 1. One time IV Dilaudid given x 1 d/t inability to receive meds via PEG tube. Pt did take scheduled oxy PO with apple juice and was able to tolerate.   CARDIAC: Tachy. No C/o CP.   RESP: Elevated RR at times.   GI/: Anuric.  BM x 1.   DIET: Regular. TF stopped at 0020 d/t possible abscess at site- Site reddened and edema present.  Did not start D10 infusion d/t pt non Diabetic and not on any insulin. Pt on Regular diet.   SKIN: Jaundiced. Bruises.   LDA'S: L TL PICC. PEG- Gtube to gravity. R CVC  LABS: To be drawn this AM  CHANGES THIS SHIFT: What appears to be an abscessed noted near pt Gtube site- TF stopped at 0020. Pt C/o increased pain at site- One time IV Dilaudid given. Pt declining any meds to be given via PEG.  MD placed order for WOC consult.   POC: Cont with POC. Plan for discharge to TCU once safe dispo and bed avail. Call light within reach.

## 2021-09-13 NOTE — ANESTHESIA PREPROCEDURE EVALUATION
Anesthesia Pre-Procedure Evaluation    Patient: Dax Villareal   MRN: [id number] : 1990        Preoperative Diagnosis: PEG tube malfunction (H) [K94.23]   Procedure : Procedure(s):  ESOPHAGOGASTRODUODENOSCOPY (EGD), PEG/J PLACEMENT.     Past Medical History:   Diagnosis Date     2008      Past Surgical History:   Procedure Laterality Date     ENDOSCOPIC INSERTION TUBE GASTROSTOMY N/A 2021    Procedure: Upper endoscopy, INSERTION, PEG-J TUBE and Gastropexy;  Surgeon: Rayshawn Will MD;  Location: [facility] OR     ENDOSCOPIC ULTRASOUND UPPER GASTROINTESTINAL TRACT (GI) N/A 2021    Procedure: ENDOSCOPIC ULTRASOUND, ESOPHAGOSCOPY / UPPER GASTROINTESTINAL TRACT (GI) with cyst gastrostomy, dilation;  Surgeon: Guru Yecenia Chacko MD;  Location: [facility] OR     ENDOSCOPIC ULTRASOUND, ESOPHAGOSCOPY, GASTROSCOPY, DUODENOSCOPY (EGD), NECROSECTOMY N/A 2021    Procedure: ESOPHAGOGASTRODUODENOSCOPY (EGD) with necrosectomy, stent exchange.;  Surgeon: Amadou Beasley MD;  Location: [facility] OR     ESOPHAGOSCOPY, GASTROSCOPY, DUODENOSCOPY (EGD), COMBINED N/A 2021    Procedure: ESOPHAGOGASTRODUODENOSCOPY (EGD), gastrostomy apposition, Necrosectomy, stent exchange.;  Surgeon: Rayshawn Will MD;  Location: [facility] OR     ESOPHAGOSCOPY, GASTROSCOPY, DUODENOSCOPY (EGD), COMBINED N/A 2021    Procedure: ESOPHAGOGASTRODUODENOSCOPY (EGD) with necrosectomy, and stents exchanged;  Surgeon: Jose Quigley MD;  Location: [facility] OR     IR ABSCESS TUBE CHANGE  2021     IR PARACENTESIS  2021     IR SINOGRAM INJECTION DIAGNOSTIC  2021     ORTHOPEDIC SURGERY Right     fracture repair     REPLACE GASTROSTOMY TUBE, PERCUTANEOUS N/A 2021    Procedure: gastro-jejunostomy tube exchange;  Surgeon: Rayshawn Will MD;  Location: [facility] OR      Allergies   Allergen Reactions     Tylenol [Acetaminophen] Itching      Social History     Tobacco Use     Smoking status: Current Every Day Smoker  "    Packs/day: 0.50     Types: Cigarettes     Smokeless tobacco: Never Used     Tobacco comment: 8/24/2021 Patient did not want to discuss quitting but would like 4 mg lozenge to use during admission. Patient also accepted \"Quitting for Good\" workbook   Substance Use Topics     Alcohol use: Yes      Wt Readings from Last 1 Encounters:   09/12/21 55.3 kg (122 lb)        Anesthesia Evaluation   Pt has had prior anesthetic. Type: General.        ROS/MED HX  ENT/Pulmonary:     (+) tobacco use, Current use,     Neurologic:     (+) delerium,     Cardiovascular:    (-) wheezes   METS/Exercise Tolerance:     Hematologic:       Musculoskeletal:       GI/Hepatic: Comment: Necrotizing pancreatitis 2/2 EtOH abuse    (+) liver disease (ESLD c/b hepatic encephalopathy),     Renal/Genitourinary:     (+) renal disease, type: ARF, Pt requires dialysis, type: Hemodialysis,     Endo:  - neg endo ROS     Psychiatric/Substance Use: Comment: ADHD    (+) psychiatric history alcohol abuse     Infectious Disease: Comment: Sepsis    (+) VRE,     Malignancy:       Other:            Physical Exam    Airway        Mallampati: II   TM distance: > 3 FB   Neck ROM: full   Mouth opening: > 3 cm    Respiratory Devices and Support     Nasal Canula 2  l/min.     Dental     Comment: Diffuse advanced carries and periodontal disease        Cardiovascular          Rhythm and rate: regular and normal     Pulmonary           breath sounds clear to auscultation   (-) no wheezes and no stridor        OUTSIDE LABS:  CBC:   Lab Results   Component Value Date    WBC 11.5 (H) 09/13/2021    WBC 11.3 (H) 09/10/2021    HGB 8.2 (L) 09/13/2021    HGB 8.2 (L) 09/10/2021    HCT 25.9 (L) 09/13/2021    HCT 26.0 (L) 09/10/2021     09/13/2021     09/10/2021     BMP:   Lab Results   Component Value Date     (L) 09/13/2021     (L) 09/11/2021    POTASSIUM 5.9 (H) 09/13/2021    POTASSIUM 4.6 09/11/2021    CHLORIDE 94 09/13/2021    CHLORIDE 95 " 09/11/2021    CO2 22 09/13/2021    CO2 22 09/11/2021    BUN 51 (H) 09/13/2021    BUN 24 09/11/2021    CR 4.59 (H) 09/13/2021    CR 2.56 (H) 09/11/2021    GLC 81 09/13/2021    GLC 84 09/13/2021     COAGS:   Lab Results   Component Value Date    PTT 97 (H) 06/28/2021    INR 1.81 (H) 09/03/2021    FIBR 456 09/03/2021     POC:   Lab Results   Component Value Date     (H) 07/05/2021     HEPATIC:   Lab Results   Component Value Date    ALBUMIN 2.9 (L) 09/04/2021    PROTTOTAL 8.8 09/04/2021    ALT 29 09/04/2021    AST 38 09/04/2021    ALKPHOS 81 09/04/2021    BILITOTAL 1.4 (H) 09/04/2021    ADALBERTO 28 06/28/2021     OTHER:   Lab Results   Component Value Date    LACT 1.6 09/09/2021    JANENE 9.9 09/13/2021    PHOS 9.0 (H) 09/13/2021    MAG 2.2 09/13/2021    LIPASE 251 09/02/2021    TSH 5.48 (H) 08/19/2021    T4 1.18 08/19/2021    CRP 45.0 (H) 09/13/2021       Anesthesia Plan    ASA Status:  3   NPO Status:  NPO Appropriate    Anesthesia Type: General.     - Airway: ETT   Induction: Intravenous.   Maintenance: Inhalation.   Techniques and Equipment:     - Lines/Monitors: BIS     Consents    Anesthesia Plan(s) and associated risks, benefits, and realistic alternatives discussed. Questions answered and patient/representative(s) expressed understanding.     - Discussed with:  Patient      - Extended Intubation/Ventilatory Support Discussed: No.      - Patient is DNR/DNI Status: No    Use of blood products discussed: No .     Postoperative Care    Pain management: IV analgesics, Oral pain medications.   PONV prophylaxis: Ondansetron (or other 5HT-3), Dexamethasone or Solumedrol     Comments:                Jose Funes MD

## 2021-09-13 NOTE — PLAN OF CARE
19:30-23:30    Temp: (!) 95.7  F (35.4  C) Temp src: Axillary BP: 109/51 Pulse: 103   Resp: 15 SpO2: 94 % O2 Device: Nasal cannula Oxygen Delivery: 3 LPM'    -C/O generalized pain, given PO Dilaudid with relief  -turned ON  tube feeding at 2145, patient said he was unable to tolerate it before due to stomach pain  -denies nausea  -alert and oriented x 4  -anuric, on dialysis    Continue with plan of care

## 2021-09-13 NOTE — PROGRESS NOTES
SPIRITUAL HEALTH SERVICES  SPIRITUAL ASSESSMENT Progress Note  [facility] 5B     Reason: Length of Stay    Attempted to see pt. Pt was asleep.     Plan: Will continue to attempt visit and will communicate the attempt to the spiritual care team. Spiritual health services remains available for any follow-up or requests     __    Rabbi Rommel Aguilar  Chaplain Resident  Pager [phone]

## 2021-09-13 NOTE — PROGRESS NOTES
Nephrology Progress Note  09/13/2021            Dax Villareal is a 30 year old male with h/o ETOH hepatitis, end stage liver disease, RADHA on HD, transferred from [facility] in [place] for septic shock on 6/28/21 for treatment of necrotizing pancreatitis and decompensated liver disease. He was initially admitted to [facility] 5/19/21 and started on RRT 5/26/2021.        Interval History :   Mr Villareal had necrosectomy for 4th time on 8/11 with no more anticipated.  Seen on HD today, stable on 3x/week HD although he has high Phos and low Na today related to his diet and H2O intake.  Will consider extra HD this week to help correct although he is particular about his frequency and length of runs, will see how revision of G-tube goes today with regards to doing an extra run tomorrow.          Assessment & Recommendations:   ESRD-Cr was 0.8 as recently as 5/19/2021 but now HD dependent for 3 months, started RRT at [facility] prior to transfer to [facility] on 5/26.  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  continuing restorative cares for now, BP's have been stable enough to support HD.  Had GI stenting of pancreas x4.                 -Line is TDC from PTA               -HD today, no UF as wt is at low for his course as he has large drain output.       Volume status-Anuric, wt at low for his course, not pulling any UF on run today.          Electrolytes/pH-K 5.9 after being 4.6 yesterday, bicarb 22.  Running HD today, Na 125, chronically runs low despite HD likely due to H2O/Soda intake, may do extra run this week to correct.      Ca/phos/pth-Ca 9.9, Mg 2.2 and Phos 9 this am, likely related to his diet.                   Anemia-Hgb low at 8.2, acute management per team, last PRBC's 7/14.  Started on EPO 5k with runs, iron sats 43%      Nutrition-Taking PO, appetite reasonable.      Seen and discussed with Dr Arellano      Recommendations were  "communicated to primary team via note        Tor Larsen, APRN CNS  Clinical Nurse Specialist  [phone]      Review of Systems:   I reviewed the following systems:  Gen: No fevers or chills  CV: No CP at rest  Resp: No SOB at rest  GI: No N/V, + pain at drain site.       Physical Exam:   I/O last 3 completed shifts:  In: 250 [P.O.:60; I.V.:10]  Out: 735 [Urine:10; Emesis/NG output:725]   /53   Pulse 104   Temp 97  F (36.1  C) (Axillary)   Resp 20   Ht 1.676 m (5' 6\")   Wt 55.3 kg (122 lb)   SpO2 95%   BMI 19.69 kg/m       GENERAL APPEARANCE: Lying in bed, in no distress.    EYES:  scleral icterus, pupils equal  Pulmonary: decreased BS   CV: tachy   - Edema - no LE edema  GI: mild distention, non-tender. Has pancreatic drain with large amounts of yellowish clear non purulent fluid  MS: no evidence of inflammation in joints, no muscle tenderness  SKIN: no rash, warm, dry, no cyanosis, + G tube erythremia   NEURO: alert/interactive  ACCESS: Right TDC with no induration or erythema    Labs:   All labs reviewed by me  Electrolytes/Renal - Recent Labs   Lab Test 09/13/21  0631 09/12/21  0635 09/11/21  2151 09/11/21  0609 09/10/21  0741 09/09/21  0800 09/07/21  0632   *  --   --  128*  --  127* 128*   POTASSIUM 5.9*  --   --  4.6  --  4.1 3.7   CHLORIDE 94  --   --  95  --  92* 95   CO2 22  --   --  22  --  22 20   BUN 51*  --   --  24  --  27 35*   CR 4.59*  --   --  2.56*  --  2.47* 2.76*   GLC 84  --  98 123*  --  145* 138*   JANENE 9.9  --   --  9.1  --  9.1 8.6   MAG 2.2 2.0  --  1.8 2.1 2.0 1.9   PHOS 9.0*  --   --   --  6.9*  --  4.8*       CBC -   Recent Labs   Lab Test 09/13/21  0631 09/10/21  0741 09/09/21  0800   WBC 11.5* 11.3* 12.8*   HGB 8.2* 8.2* 8.9*    230 249       LFTs -   Recent Labs   Lab Test 09/04/21  0414 09/03/21  0523 09/02/21  1730   ALKPHOS 81 95 120   BILITOTAL 1.4* 2.0* 2.2*   ALT 29 31 40   AST 38 39 57*   PROTTOTAL 8.8 9.2* 10.7*   ALBUMIN 2.9* 2.3* 2.7* "       Iron Panel -   Recent Labs   Lab Test 08/14/21 2055 07/19/21  0632   IRON 28* 31*   IRONSAT 21 43   JERRELL 2,186*  --            Current Medications:    sodium bicarbonate  325 mg Per Feeding Tube Q4H    And     amylase-lipase-protease  1-2 capsule Per Feeding Tube Q4H     amylase-lipase-protease  2 capsule Oral TID w/meals     B and C vitamin Complex with folic acid  5 mL Per Feeding Tube Daily     fiber modular (NUTRISOURCE FIBER)  1 packet Per Feeding Tube TID     folic acid  1 mg Oral Daily     heparin lock flush  5-20 mL Intracatheter Q24H     melatonin  6 mg Oral At Bedtime     menthol   Transdermal Q8H     oxyCODONE  5 mg Oral Q8H     pantoprazole (PROTONIX) IV  40 mg Intravenous Daily with breakfast     protein modular  1 packet Per Feeding Tube Daily     sevelamer carbonate (RENVELA)  0.8 g Oral TID     sodium chloride (PF)  3 mL Intracatheter Q8H     sodium chloride (PF)  3 mL Intracatheter Q8H     sodium chloride (PF)  3 mL Intracatheter Q8H     sodium chloride (PF)  9 mL Intracatheter During Dialysis/CRRT (from stock)     sodium chloride (PF)  9 mL Intracatheter During Dialysis/CRRT (from stock)     vitamin B1  100 mg Oral Daily    Or     thiamine  100 mg Intravenous Daily       - MEDICATION INSTRUCTIONS -       dextrose       heparin (porcine) 500 Units/hr (09/13/21 0950)     heparin (porcine) 1,000 Units/hr (08/24/21 0907)

## 2021-09-13 NOTE — PROGRESS NOTES
HEMODIALYSIS TREATMENT NOTE    Date: 9/13/2021  Time: 12:56 PM    Data:  Pre Wt: 55.3 kg (121 lb 14.6 oz)   Desired Wt: 55.3 kg   Post Wt: 55.3 kg (121 lb 14.6 oz)  Weight change: 0 kg  Ultrafiltration - Post Run Net Total Removed (mL): 0 mL  Vascular Access Status: patent  Dialyzer Rinse: Clear  Total Blood Volume Processed: 38.34 L Liters  Total Dialysis (Treatment) Time: 3 Hours    Lab:       Interventions:  3 hours of HD with no fluid pulled per order. No complications.    Assessment:  ESRD patient in for his regular HD run     Plan:    HD wednesday

## 2021-09-13 NOTE — ANESTHESIA PROCEDURE NOTES
Airway       Patient location during procedure: OR       Procedure Start/Stop Times: 9/13/2021 5:55 PM  Staff -        Anesthesiologist:  Jose Funes MD       Resident/Fellow: Chemo Parada MD       Performed By: resident  Consent for Airway        Urgency: elective  Indications and Patient Condition       Indications for airway management: gera-procedural       Induction type:intravenous       Mask difficulty assessment: 1 - vent by mask    Final Airway Details       Final airway type: endotracheal airway       Successful airway: ETT - single  Endotracheal Airway Details        ETT size (mm): 8.0       Cuffed: yes       Successful intubation technique: direct laryngoscopy       DL Blade Type: MAC 4       Grade View of Cords: 1       Adjucts: stylet       Position: Right       Measured from: gums/teeth       Secured at (cm): 22       Bite block used: None    Post intubation assessment        Placement verified by: capnometry, equal breath sounds and chest rise        Number of attempts at approach: 1       Number of other approaches attempted: 0       Secured with: plastic tape       Ease of procedure: easy       Dentition: Intact and Unchanged

## 2021-09-13 NOTE — PROGRESS NOTES
[facility]    Progress Note - [place] 3 Service        Date of Admission:  6/28/2021    Assessment & Plan             Dax Villareal is a 30 year old male with hx of alcohol use disorder admitted on 6/28/2021 after recent prolonged admission at [facility] in [place] who has severe acute alcoholic hepatitis c/b HE, ESRD requiring HD, and malnutrition in the setting of acute necrotizing pancreatitis, s/p PEG-J placement on 7/13. He arrived with acute respiratory failure and septic shock that have resolved, and his secondary bacterial peritonitis has been appropriately treated. He requires continued management for ESRD, hepatic encephalopathy, infected pancreatic pseudocyst, nutritional support via PEG-J, and persistent right pleural effusion. Clinically improving after multiple necrosectomies. Increasing leukocytosis and hypotension on 9/2 prompting resumption of IV antibiotics - has been stable since that time. Repeat CT with interval improvement in fluid collections.     Today:   - PEG insertion site with erythema and area of induration   - CT A/P - G-tube balloon flush with the skin   - GI aware and planning to replace G-tube   - HD today without fluid removal       #Septic Shock, resolved  #Necrotizing alcoholic pancreatitis w/ infected pseudocyst, improving   #Hepatic Fluid collection, stable to improving  #Secondary Bacterial Peritonitis 2/2 infected pseudocyst with VRE, improving  Admitted to the [facility] ICU on 6/28/2021 from [facility] in [place] for septic shock due to necrotizing pancreatitis. CT on admit showing mature collection with enhancing wall measuring ~[id number]. IR placed perc drain 6/29. Repeat imaging 7/18 revealed walled off necrotic collection amenable to endoscopic transluminal drainage with cystgastrostomy stenting with necrosectomy on 7/21/21. Complete necrosectomy on 8/11. Antibiotics (cefepime and metronidazole) were discontinued on 8/25  after left flank drain removed, re-initiated in the setting of fever on 9/2. Favor abdominal etiology, repeat CT on 9/7 with interval improvement in fluid collections - will complete 7d course of abx per ID with EOT 9/8.   - Will likely need intermittent antibiotics with fluid collections occasionally with bacterial growth moving forward as to be expected in nec panc      # Hypovolemia  Patient with substantial daily NG output and poor PO. Also note significant weight loss over the past few weeks. May need intermittent boluses of normal saline for euvolemia.   - Weight 78kg on 8/13. Now 55 kg. Concern for weight loss vs. targeting dry weight.              - D/W Nephrology about concern. Asked to re-evaluate volume status vs. Weight as he seems to be hypotensive and symptomatic after large volume removals.   -Monitor vitals and output post dialysis, consider fluids prn.     #Gastric Outlet Obstruction  High G tube output due to the cystogastric connection/fluid drainage noted on 8/5. Per GI, possibly related to gastric outlet from severe abdominal inflammation and is expected. KUB negative for obstructive gas pattern. Continues to have mild to moderate NG output - stable to improved. Relieved with draining G tube to gravity when experiencing pain/nausea.      #Hyponatremia, stable  Patient with down-trending sodium the week of 8/27; likely due to substantial NG output and low solute intake and hepatic dysfunction. Stable 127-128.     #Right Pancreatic Hydrothorax, stable  Increased dyspnea with CXR on 7/19 with further imaging and thoracentesis confirming a hepatic/pancreatic hydrothorax. Thoracentesis PRN for dyspnea, most recently 9/3.     #Alcoholic Hepatitis  #Coagulation Defect  Received 1 week of steroids at [facility] for elevated INR and altered mental status. Not a liver transplant candidate. Mental status improved with aggressive lactulose and rifaxamin, which was discontinued without return of  AMS.     ESRD due to ATN  Hyperphosphatemia  Stage III RADHA due to ATN from septic shock without recovery for >3 months. Line is TDC from PTA. Nephrology dialyzed on Tu, Th, Sa schedule.  - Will need follow-up and outpatient dialysis  - Sevelemer powder TID with TF for hyperphosphatemia      #Anemia due to Chronic Illness  #Hematochezia, resolved  #Hemoperitoneum  Baseline hgb 17. Running between 6-8 in setting of chronic illness, new ESRD with epocrit per Nephrology, frequent lab draws and procedures. Hematochezia on 8/8 after starting standard intensity heparin, now resolved. Paracentesis on 8/21 with hemoperitoneum. No active bleeding. Required a unit of PRBC on 9/4 in the setting of several fluid boluses.   - Goal hgb > 7.0, trend Hgb  - Hgb has been stable.      #Alcohol Use Disorder  Counseled patient on complete alcohol cessation. Not interested in additional services at this time. Continue daily folic acid and thiamine.     #Lactic Acidosis  Intermittent Lactic acidosis in the setting of sepsis, ESRD, bleeding.     #Hypokalemia  Noted in the setting of high gastric output and improved once G-tube output decreased. CTM.      #Severe Malnutrition  Poor oral intake in the setting of necrotizing pancreatitis. PEG tube placed 7/13. Dietitian consulted and continuous tube feeds with goal of 65 ml/hr with continuous pancreatic enzymes  - Continuous tube feeds 50 mL overnight.      Diet: Adult Formula Drip Feeding: Continuous Vital 1.5; Jejunostomy; Goal Rate: 60; mL/hr; Medication - Feeding Tube Flush Frequency: At least 15-30 mL water before and after medication administration and with tube clogging; Amount to Send (Nutrition us...  NPO per Anesthesia Guidelines for Procedure/Surgery Except for: Meds    DVT Prophylaxis: Pneumatic Compression Devices  Rdz Catheter: Not present  Fluids: None   Central Lines: PRESENT  PICC Triple Lumen 06/28/21 Left-Site Assessment: WDL  CVC Double Lumen Right-Site Assessment:  WDL  Code Status: Full Code      Disposition Plan   Expected discharge: 09/13/2021   recommended to transitional care unit once safe disposition plan/ TCU bed available.     The patient's care was discussed with the Attending Physician, Dr. Gresham, Bedside Nurse, Patient, Patient's Family and GI  Consultant.    Tri Lozada MD  [facility] 3 Service  [facility]  Securely message with the Vocera Web Console (learn more here)  Text page via [employer] Paging/Directory  Please see sign in/sign out for up to date coverage information    Clinically Significant Risk Factors Present on Admission                ______________________________________________________________________    Interval History   He reports significant tenderness at the PEG insertion site with some nausea. He otherwise reports that he is feeling OK. Persistent discomfort in his pinky toe but not worsening. No fevers/chills. Spoke with his sister on the phone who reports they had an electric wheelchair donated for him - we will encourage him to try PT to sit upright to be able to use.     Data reviewed today: I reviewed all medications, new labs and imaging results over the last 24 hours. I personally reviewed the abdominal CT image(s) showing balloon at the skin for PEG tube.    Physical Exam   Vital Signs: Temp: (!) 95.4  F (35.2  C) Temp src: Axillary BP: (!) 99/37 Pulse: 115   Resp: 18 SpO2: 96 % O2 Device: Nasal cannula Oxygen Delivery: 3 LPM  Weight: 122 lbs 0 oz  Physical Exam  Vitals and nursing note reviewed.   HENT:      Mouth/Throat:      Mouth: Mucous membranes are moist.   Eyes:      Conjunctiva/sclera: Conjunctivae normal.      Pupils: Pupils are equal, round, and reactive to light.   Cardiovascular:      Rate and Rhythm: Regular rhythm. Tachycardia present.      Heart sounds: Normal heart sounds.   Pulmonary:      Effort: Pulmonary effort is normal.      Breath sounds: Normal breath sounds.    Abdominal:      General: Bowel sounds are normal.      Palpations: Abdomen is soft.      Tenderness: There is abdominal tenderness.   Musculoskeletal:      Right lower leg: No edema.      Left lower leg: No edema.   Skin:     General: Skin is warm and dry.             Comments: Area of tenderness and erythema surrounding the PEG site, area of induration pushing up on the skin    Neurological:      Mental Status: He is alert. Mental status is at baseline.           Data   Recent Labs   Lab 09/13/21  0631 09/11/21  2151 09/11/21  0609 09/10/21  0741 09/09/21  0800   WBC 11.5*  --   --  11.3* 12.8*   HGB 8.2*  --   --  8.2* 8.9*   MCV 96  --   --  95 100     --   --  230 249   *  --  128*  --  127*   POTASSIUM 5.9*  --  4.6  --  4.1   CHLORIDE 94  --  95  --  92*   CO2 22  --  22  --  22   BUN 51*  --  24  --  27   CR 4.59*  --  2.56*  --  2.47*   ANIONGAP 9  --  11  --  13   JANENE 9.9  --  9.1  --  9.1   GLC 84 98 123*  --  145*     No results found for this or any previous visit (from the past 24 hour(s)).  Medications     - MEDICATION INSTRUCTIONS -       dextrose       heparin (porcine) 1,000 Units/hr (08/24/21 0907)       sodium bicarbonate  325 mg Per Feeding Tube Q4H    And     amylase-lipase-protease  1-2 capsule Per Feeding Tube Q4H     amylase-lipase-protease  2 capsule Oral TID w/meals     B and C vitamin Complex with folic acid  5 mL Per Feeding Tube Daily     fiber modular (NUTRISOURCE FIBER)  1 packet Per Feeding Tube TID     folic acid  1 mg Oral Daily     heparin lock flush  5-20 mL Intracatheter Q24H     melatonin  6 mg Oral At Bedtime     menthol   Transdermal Q8H     oxyCODONE  5 mg Oral Q8H     pantoprazole (PROTONIX) IV  40 mg Intravenous Daily with breakfast     protein modular  1 packet Per Feeding Tube Daily     sevelamer carbonate (RENVELA)  0.8 g Oral TID     sodium chloride (PF)  3 mL Intracatheter Q8H     sodium chloride (PF)  3 mL Intracatheter Q8H     sodium chloride (PF)  3  mL Intracatheter Q8H     vitamin B1  100 mg Oral Daily    Or     thiamine  100 mg Intravenous Daily

## 2021-09-13 NOTE — PROGRESS NOTES
/WOC Nurse Inpatient Wound Assessment   Reason for consultation: Evaluate and treat  Peg tube wounds    Assessment  Peg tube wounds due to Moisture Associated Skin Damage (MASD)  Status: evolving and developed large abscess around the tube, hypergranular tissue, noted increase drainage around the tube     9/3-Treated with silver nitrate stick  9/8- Treated with silver nitrate    Treatment Plan  Peg tube wounds: Daily  and PRN   Cleanse wound bed with NS and pat dry.  Paint gera tube area with No sting film barrier  Place fenestrated gauze under the tube. DO not add more dressing under the bumper, may add gauze on top of the bumper if needed.  Ensure to stabilize the tube      Orders Written  Recommended provider order: Surgical consult for I&D  WOC Nurse follow-up plan:weekly  Nursing to notify the Provider(s) and re-consult the WOC Nurse if wound(s) deteriorates or new skin concern.    Patient History  According to provider note(s):  30 year old male with a history of heavy alcohol use who presented to OSH 5/19 for abdominal pain, nausea and vomiting, admitted for alcohol hepatitis, ESLD, RADHA on HD, septic shock requiring pressors as well as necrotizing pancreatitis with large evolving necrotic collection. Transferred to [facility] for consideration of drainage of presumed infected necrotic collection. S/p EUS-guided necrosectomy (7/21/21).    Objective Data  Containment of urine/stool: Continent of bladder and Continent of bowel    Active Diet Order  Orders Placed This Encounter      Regular Diet Adult      Output:   I/O last 3 completed shifts:  In: 250 [P.O.:60; I.V.:10]  Out: 735 [Urine:10; Emesis/NG output:725]    Risk Assessment:   Sensory Perception: 4-->no impairment  Moisture: 3-->occasionally moist  Activity: 2-->chairfast  Mobility: 2-->very limited  Nutrition: 3-->adequate  Friction and Shear: 2-->potential problem  Tyrel Score: 16                          Labs:   Recent Labs   Lab 09/13/21  0631   HGB 8.2*    WBC 11.5*   CRP 45.0*       Physical Exam  Areas of skin assessed: focused peg tube site    Wound Location:  Peg tube           8/19 9/13          Date of last photo 9/13  Wound History:  7/13 - PEG-J placement with T-tag gastropexy    Wound Base: 100 % raised abscess, erythema, induration and drainage approximately 3x4x1.5 cm raised     Palpation of the wound bed: normal      Drainage: small     Description of drainage: cloudy and tan     Measurements (length x width x depth, in cm)   Tunneling N/A     Undermining N/A  Periwound skin: erythema- blanchable and moist, dried drainage      Color: pink      Temperature: normal   Odor: none  Pain: severe and with assessment,   Pain intervention prior to dressing change: NA    Interventions  Visual inspection and assessment completed   Wound Care Rationale Protect periwound skin, Promote moist wound healing without tissue dehydration  and Decrease bacterial load  Wound Care: done per plan of care- treated with silver nitrate and discontinued hydrofera blue  Supplies: floor stock and discussed with RN  Current off-loading measures: Pillows  Current support surface: Standard  Low air loss mattress  Education provided to: plan of care and wound progress  Discussed plan of care with Patient and Nurse    Deneen Matthews RN CWOCN

## 2021-09-13 NOTE — PROVIDER NOTIFICATION
Daniela3.  5B.  RM 36.  KR.  G-tube site red/swollen, inflamed and painful. Pt unable to tolerate the pain at site. TF stopped. Pt wants breakthrough IV pain med. Does not want any med via G-tube d/t pain.  Thanks.  Vena.  [id number]

## 2021-09-14 LAB
ANION GAP SERPL CALCULATED.3IONS-SCNC: 13 MMOL/L (ref 3–14)
BUN SERPL-MCNC: 35 MG/DL (ref 7–30)
CALCIUM SERPL-MCNC: 9.2 MG/DL (ref 8.5–10.1)
CHLORIDE BLD-SCNC: 94 MMOL/L (ref 94–109)
CO2 SERPL-SCNC: 20 MMOL/L (ref 20–32)
CREAT SERPL-MCNC: 3.48 MG/DL (ref 0.66–1.25)
GFR SERPL CREATININE-BSD FRML MDRD: 22 ML/MIN/1.73M2
GLUCOSE BLD-MCNC: 206 MG/DL (ref 70–99)
HOLD SPECIMEN: NORMAL
MAGNESIUM SERPL-MCNC: 2.2 MG/DL (ref 1.6–2.3)
POTASSIUM BLD-SCNC: 5.2 MMOL/L (ref 3.4–5.3)
SODIUM SERPL-SCNC: 127 MMOL/L (ref 133–144)

## 2021-09-14 PROCEDURE — 250N000013 HC RX MED GY IP 250 OP 250 PS 637: Performed by: STUDENT IN AN ORGANIZED HEALTH CARE EDUCATION/TRAINING PROGRAM

## 2021-09-14 PROCEDURE — 250N000013 HC RX MED GY IP 250 OP 250 PS 637: Performed by: INTERNAL MEDICINE

## 2021-09-14 PROCEDURE — 36592 COLLECT BLOOD FROM PICC: CPT | Performed by: INTERNAL MEDICINE

## 2021-09-14 PROCEDURE — 99232 SBSQ HOSP IP/OBS MODERATE 35: CPT | Mod: GC | Performed by: HOSPITALIST

## 2021-09-14 PROCEDURE — 83735 ASSAY OF MAGNESIUM: CPT | Performed by: INTERNAL MEDICINE

## 2021-09-14 PROCEDURE — 250N000011 HC RX IP 250 OP 636: Performed by: INTERNAL MEDICINE

## 2021-09-14 PROCEDURE — 99233 SBSQ HOSP IP/OBS HIGH 50: CPT | Performed by: INTERNAL MEDICINE

## 2021-09-14 PROCEDURE — C9113 INJ PANTOPRAZOLE SODIUM, VIA: HCPCS | Performed by: INTERNAL MEDICINE

## 2021-09-14 PROCEDURE — 120N000002 HC R&B MED SURG/OB UMMC

## 2021-09-14 PROCEDURE — 82310 ASSAY OF CALCIUM: CPT | Performed by: CLINICAL NURSE SPECIALIST

## 2021-09-14 RX ADMIN — HYDROMORPHONE HYDROCHLORIDE 1 MG: 1 SOLUTION ORAL at 10:01

## 2021-09-14 RX ADMIN — SODIUM BICARBONATE 325 MG: 325 TABLET ORAL at 17:15

## 2021-09-14 RX ADMIN — PANCRELIPASE 2 CAPSULE: 24000; 76000; 120000 CAPSULE, DELAYED RELEASE PELLETS ORAL at 21:18

## 2021-09-14 RX ADMIN — OXYCODONE HYDROCHLORIDE 5 MG: 5 SOLUTION ORAL at 12:35

## 2021-09-14 RX ADMIN — Medication 5 ML: at 17:19

## 2021-09-14 RX ADMIN — PANTOPRAZOLE SODIUM 40 MG: 40 INJECTION, POWDER, FOR SOLUTION INTRAVENOUS at 09:49

## 2021-09-14 RX ADMIN — Medication 5 ML: at 09:50

## 2021-09-14 RX ADMIN — SEVELAMER CARBONATE 0.8 G: 800 POWDER, FOR SUSPENSION ORAL at 09:52

## 2021-09-14 RX ADMIN — OXYCODONE HYDROCHLORIDE 5 MG: 5 SOLUTION ORAL at 04:14

## 2021-09-14 RX ADMIN — PANCRELIPASE 2 CAPSULE: 24000; 76000; 120000 CAPSULE, DELAYED RELEASE PELLETS ORAL at 09:45

## 2021-09-14 RX ADMIN — PANCRELIPASE 2 CAPSULE: 24000; 76000; 120000 CAPSULE, DELAYED RELEASE PELLETS ORAL at 14:39

## 2021-09-14 RX ADMIN — MELATONIN TAB 3 MG 6 MG: 3 TAB at 00:11

## 2021-09-14 RX ADMIN — CYCLOBENZAPRINE 10 MG: 10 TABLET, FILM COATED ORAL at 21:18

## 2021-09-14 RX ADMIN — SODIUM BICARBONATE 325 MG: 325 TABLET ORAL at 09:46

## 2021-09-14 RX ADMIN — OXYCODONE HYDROCHLORIDE 5 MG: 5 SOLUTION ORAL at 20:43

## 2021-09-14 RX ADMIN — HYDROXYZINE HYDROCHLORIDE 50 MG: 25 TABLET, FILM COATED ORAL at 18:33

## 2021-09-14 RX ADMIN — SEVELAMER CARBONATE 0.8 G: 800 POWDER, FOR SUSPENSION ORAL at 17:14

## 2021-09-14 RX ADMIN — FOLIC ACID 1 MG: 1 TABLET ORAL at 09:50

## 2021-09-14 RX ADMIN — HYDROMORPHONE HYDROCHLORIDE 1 MG: 1 SOLUTION ORAL at 05:53

## 2021-09-14 RX ADMIN — HYDROXYZINE HYDROCHLORIDE 50 MG: 25 TABLET, FILM COATED ORAL at 06:17

## 2021-09-14 RX ADMIN — Medication 5 ML: at 05:39

## 2021-09-14 RX ADMIN — HYDROMORPHONE HYDROCHLORIDE 1 MG: 1 SOLUTION ORAL at 02:23

## 2021-09-14 RX ADMIN — Medication 1 PACKET: at 09:54

## 2021-09-14 RX ADMIN — Medication 1 PACKET: at 09:51

## 2021-09-14 RX ADMIN — SODIUM BICARBONATE 325 MG: 325 TABLET ORAL at 14:38

## 2021-09-14 RX ADMIN — PANCRELIPASE 2 CAPSULE: 24000; 76000; 120000 CAPSULE, DELAYED RELEASE PELLETS ORAL at 06:16

## 2021-09-14 RX ADMIN — PANCRELIPASE 2 CAPSULE: 24000; 76000; 120000 CAPSULE, DELAYED RELEASE PELLETS ORAL at 17:15

## 2021-09-14 RX ADMIN — SEVELAMER CARBONATE 0.8 G: 800 POWDER, FOR SUSPENSION ORAL at 20:43

## 2021-09-14 RX ADMIN — Medication 1 PACKET: at 20:49

## 2021-09-14 RX ADMIN — Medication 1 PACKET: at 17:20

## 2021-09-14 RX ADMIN — SODIUM BICARBONATE 325 MG: 325 TABLET ORAL at 03:03

## 2021-09-14 RX ADMIN — SODIUM BICARBONATE 325 MG: 325 TABLET ORAL at 06:15

## 2021-09-14 RX ADMIN — SODIUM BICARBONATE 325 MG: 325 TABLET ORAL at 21:18

## 2021-09-14 RX ADMIN — PANCRELIPASE 2 CAPSULE: 24000; 76000; 120000 CAPSULE, DELAYED RELEASE PELLETS ORAL at 03:03

## 2021-09-14 RX ADMIN — HYDROMORPHONE HYDROCHLORIDE 1 MG: 1 SOLUTION ORAL at 17:35

## 2021-09-14 RX ADMIN — THIAMINE HCL TAB 100 MG 100 MG: 100 TAB at 09:50

## 2021-09-14 ASSESSMENT — ACTIVITIES OF DAILY LIVING (ADL)
ADLS_ACUITY_SCORE: 18
ADLS_ACUITY_SCORE: 17
ADLS_ACUITY_SCORE: 18
ADLS_ACUITY_SCORE: 17

## 2021-09-14 ASSESSMENT — PAIN DESCRIPTION - DESCRIPTORS
DESCRIPTORS: ACHING
DESCRIPTORS: ACHING
DESCRIPTORS: SORE;ACHING

## 2021-09-14 ASSESSMENT — MIFFLIN-ST. JEOR: SCORE: 1487.75

## 2021-09-14 NOTE — ANESTHESIA CARE TRANSFER NOTE
Patient: Dax Villareal    Procedure(s):  ESOPHAGOGASTRODUODENOSCOPY (EGD), GASTROSTOMY TUBE REMOVAL, FISTULAR CLOSURE ENDOSCOPIC. PEG/J PLACEMENT.    Diagnosis: PEG tube malfunction (H) [K94.23]  Diagnosis Additional Information: No value filed.    Anesthesia Type:   No value filed.     Note:    Oropharynx: oropharynx clear of all foreign objects  Level of Consciousness: awake  Oxygen Supplementation: nasal cannula  Level of Supplemental Oxygen (L/min / FiO2): 3  Independent Airway: airway patency satisfactory and stable  Dentition: dentition unchanged  Vital Signs Stable: post-procedure vital signs reviewed and stable  Report to RN Given: handoff report given  Patient transferred to: PACU  Comments: VSS. Breathing spontaneously at a regular rate with adequate tidal volumes and maintaining O2 sats. Denies nausea. Reports some pain, and was given 25mcg fentanyl, appropriate response. No apparent complications from anesthesia.     Chemo Parada MD  Anesthesia CA-1    Handoff Report: Identifed the Patient, Identified the Reponsible Provider, Reviewed the pertinent medical history, Discussed the surgical course, Reviewed Intra-OP anesthesia mangement and issues during anesthesia, Set expectations for post-procedure period and Allowed opportunity for questions and acknowledgement of understanding      Vitals:  Vitals Value Taken Time   BP 99/49 09/13/21 1915   Temp 36.5  C (97.7  F) 09/13/21 1906   Pulse 109 09/13/21 1916   Resp 20 09/13/21 1916   SpO2 92 % 09/13/21 1916   Vitals shown include unvalidated device data.    Electronically Signed By: Chemo Parada MD  September 13, 2021  7:18 PM

## 2021-09-14 NOTE — PROVIDER NOTIFICATION
D/I: Pt is alert and oriented x4. Tachycardic and dyspneic on 3L oxygen via NC over night. Right Chest dialysis access intact. New GJ tube patent with J--port running TF @ 60mL/hr with solution + enzymes/NaBicarb Q4h. G-port to gravity with 550 ml output. Old GJ site with large drainage, dressing changed x 2, but pt refused to have the packing done. Scheduled oxycodone given for pain and Dilaudid oral PRN. Skin is jaundiced and fragile. Pt repositions independently in bed.   P: Will continue to monitor pt and follow POC. Dialysis this morning.

## 2021-09-14 NOTE — PROGRESS NOTES
"Return from PACU/OR to  at 2025 s/p GJ tube revision. Site of old tube with abscess is packed and has primapore covering. He states that pain is under control. Refuses to reposition. Repeatedly discussed consequences of refusing repositioning, risk of pressure ulcers. Despite this, he refuses to reposition in bed, has been supine since going to CT at 1200. Skin on back last checked 1500 and had some blanchable redness. Vitals are stable: /52   Pulse 104   Temp (!) 96.3  F (35.7  C) (Oral)   Resp 18   Ht 1.676 m (5' 6\")   Wt 55 kg (121 lb 4.1 oz)   SpO2 95%   BMI 19.57 kg/m    Will start rate at goal rate 60ml/hour, monitor for any changes.   "

## 2021-09-14 NOTE — PROGRESS NOTES
Nephrology Progress Note  09/14/2021         Dax Villareal is a 30 year old male with h/o ETOH hepatitis, end stage liver disease, RADHA on HD, transferred from [facility] in [place] for septic shock on 6/28/21 for treatment of necrotizing pancreatitis and decompensated liver disease. He was initially admitted to [facility] 5/19/21 and started on RRT 5/26/2021.        Interval History :   Mr Villareal had necrosectomy for 4th time on 8/11 with no more anticipated.  Had HD yesterday and G-tube replacement, holding on run today with plan for run tomorrow.  Labs added on to this am's sample, main issue recently has been elevated Phos, can consider changing to renal TF.        Assessment & Recommendations:   ESRD-Cr was 0.8 as recently as 5/19/2021 but now HD dependent for 3 months, started RRT at [facility] prior to transfer to [facility] on 5/26.  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  continuing restorative cares for now, BP's have been stable enough to support HD.  Had GI stenting of pancreas x4.                 -Line is TDC from PTA               -Holding on run today, plan for HD tomorrow.       Volume status-Anuric, wt at low for his course, did not pull with run yesterday.        Electrolytes/pH-Added on for today, K was up prior to run yesterday.      Ca/phos/pth-Phos has been up, likely related to diet, can change TF to renal formula when restarted.                   Anemia-Hgb low at 8.2, acute management per team, last PRBC's 7/14.  Started on EPO 5k with runs, iron sats 43%      Nutrition-Taking PO, appetite poor lately, has been NPO due to G tube placement yesterday, would change to renal formula when restarting to limit phos.     Seen and discussed with Dr Arellano      Recommendations were communicated to primary team via note     SOFIA Marlow CNS  Clinical Nurse Specialist  [phone]    Review of Systems:   I reviewed the  "following systems:  Gen: No fevers or chills  CV: No CP at rest  Resp: No SOB at rest  GI: No N/V, + tenderness at G-tube site    Physical Exam:   I/O last 3 completed shifts:  In: 335 [P.O.:25; I.V.:310]  Out: 550 [Emesis/NG output:550]   /43 (BP Location: Right arm)   Pulse 101   Temp (!) 96  F (35.6  C) (Oral)   Resp 16   Ht 1.676 m (5' 6\")   Wt 55 kg (121 lb 4.1 oz)   SpO2 94%   BMI 19.57 kg/m       GENERAL APPEARANCE: Lying in bed, in no distress.    EYES:  scleral icterus, pupils equal  Pulmonary: decreased BS   CV: tachy   - Edema - no LE edema  GI: mild distention, non-tender. + tenderness at G-tube site  MS: no evidence of inflammation in joints, no muscle tenderness  SKIN: no rash, warm, dry, no cyanosis, + G tube erythremia   NEURO: alert/interactive  ACCESS: Right TDC with no induration or erythema    Labs:   All labs reviewed by me  Electrolytes/Renal - Recent Labs   Lab Test 09/14/21  0544 09/13/21  1545 09/13/21  0631 09/12/21  0635 09/11/21  2151 09/11/21  0609 09/10/21  0741 09/09/21  0800 09/07/21  0632 08/26/21  1419   NA  --   --  125*  --   --  128*  --  127* 128*  --    POTASSIUM  --   --  5.9*  --   --  4.6  --  4.1 3.7  --    CHLORIDE  --   --  94  --   --  95  --  92* 95  --    CO2  --   --  22  --   --  22  --  22 20  --    BUN  --   --  51*  --   --  24  --  27 35*  --    CR  --   --  4.59*  --   --  2.56*  --  2.47* 2.76*  --    GLC  --  81 84  --  98 123*  --  145* 138*  --    JANENE  --   --  9.9  --   --  9.1  --  9.1 8.6  --    MAG 2.2  --  2.2 2.0  --  1.8 2.1 2.0 1.9   < >   PHOS  --   --  9.0*  --   --   --  6.9*  --  4.8*  --     < > = values in this interval not displayed.       CBC -   Recent Labs   Lab Test 09/13/21  0631 09/10/21  0741 09/09/21  0800   WBC 11.5* 11.3* 12.8*   HGB 8.2* 8.2* 8.9*    230 249       LFTs -   Recent Labs   Lab Test 09/04/21  0414 09/03/21  0523 09/02/21  1730   ALKPHOS 81 95 120   BILITOTAL 1.4* 2.0* 2.2*   ALT 29 31 40   AST 38 39 " 57*   PROTTOTAL 8.8 9.2* 10.7*   ALBUMIN 2.9* 2.3* 2.7*       Iron Panel -   Recent Labs   Lab Test 08/14/21 2055 07/19/21  0632   IRON 28* 31*   IRONSAT 21 43   JERRELL 2,186*  --            Current Medications:    sodium bicarbonate  325 mg Per Feeding Tube Q4H    And     amylase-lipase-protease  1-2 capsule Per Feeding Tube Q4H     amylase-lipase-protease  2 capsule Oral TID w/meals     B and C vitamin Complex with folic acid  5 mL Per Feeding Tube Daily     fiber modular (NUTRISOURCE FIBER)  1 packet Per Feeding Tube TID     folic acid  1 mg Oral Daily     heparin lock flush  5-20 mL Intracatheter Q24H     melatonin  6 mg Oral At Bedtime     menthol   Transdermal Q8H     oxyCODONE  5 mg Oral Q8H     pantoprazole (PROTONIX) IV  40 mg Intravenous Daily with breakfast     protein modular  1 packet Per Feeding Tube Daily     sevelamer carbonate (RENVELA)  0.8 g Oral TID     sodium chloride (PF)  3 mL Intracatheter Q8H     sodium chloride (PF)  3 mL Intracatheter Q8H     sodium chloride (PF)  3 mL Intracatheter Q8H     vitamin B1  100 mg Oral Daily    Or     thiamine  100 mg Intravenous Daily       - MEDICATION INSTRUCTIONS -       dextrose       heparin (porcine) 1,000 Units/hr (08/24/21 0907)

## 2021-09-14 NOTE — PROGRESS NOTES
Pt finally called for bedpan around 1230. Allowed nursing staff to change linen at the time. This writer noticed that dressing on old GJ site was soaked through again but pt absolutely refused to have dressing change at the time. Will try to come back later after pt has a nap in. Declined both breakfast and lunch. TF running at 60 mL/hr with scheduled water flushes in between.

## 2021-09-14 NOTE — PROGRESS NOTES
Care Management Follow Up    Length of Stay (days): 78    Expected Discharge Date: 09/20/2021     Concerns to be Addressed: discharge planning     Patient plan of care discussed at interdisciplinary rounds: Yes    Anticipated Discharge Disposition: Skilled Nursing Facilty, Transitional Care     Anticipated Discharge Services:  (New outpatient dialysis)  Anticipated Discharge DME: None    Patient/family educated on Medicare website which has current facility and service quality ratings: no (Pt requesting TCU in or near [place])  Education Provided on the Discharge Plan:    Patient/Family in Agreement with the Plan: yes    Referrals Placed by CM/SW:    Private pay costs discussed: Not applicable    Additional Information:  SW following for discharge needs. Received notice from Jody De Leon, [facility] financial counselor, that she spoke with [facility] and they switched pt's address to [facility], which allows pt's PMAP plan to change from [facility] ([facility] is only in network in [facility]). Pt's plan will change to straight Medicaid or another PMAP on 10/1/21, which will allow pt to have in network coverage for facilities in the [facility] area.     SW will continue to follow. Of note, pt has not participated in PT/OT since 9/7 and has not participated in therapies consistently during this admission, so making TCU referrals is not appropriate at this time. Additionally, pt has been declined at [facility] for outpatient dialysis, as their medical director does not feel he is stable for OP HD. Pt's referral is being reviewed by Judson LOOMIS.     RONNA Delaney, LICSW  [address]   [facility]  Phone: [phone]  Pager: [phone]

## 2021-09-14 NOTE — PROGRESS NOTES
Pt finally, reluctantly let this writer change dressing on abd. Old GJ site was leaking a significant amt of clear drainage after packing removed. 200 mL of output from G tube since 0600. Pt's bed linen is currently soaked but refused to have this writer change sheets, wanted to be left alone to sleep.

## 2021-09-14 NOTE — OP NOTE
Upper GI Endoscopy 09/13/2021  5:22 PM [address] [place] [address] [phone]     Endoscopy Department   _______________________________________________________________________________   Patient Name: Dax Villareal              Procedure Date: 9/13/2021 5:22 PM   MRN: [id number]                       Account Number: [id number]   YOB: 1990             Admit Type: Inpatient   Age: 30                                Gender: Male   Note Status: Finalized                Attending MD: Rayshawn Will MD   Total Sedation Time:                     _______________________________________________________________________________       Procedure:           Upper GI endoscopy   Indications:         Place PEG-J   Providers:           Rayshawn Will MD, Patricio Mattson MD   Patient Profile:     Mr Villareal is a 29yo gentleman with a severe episode of                        necrotizing pancreatitis requiring endoscopic                        transluminal drainage, translluminal necrosectomies, and                        placement of GJ tube. His admission has been long and                        more recently he has evidence of burried gastrostomy                        bumper. He now proceeds to upper endoscopy for                        management.   Referring MD:        Brianna Burks   Medicines:           General Anesthesia, Ancef 2000 mg IV   Complications:       No immediate complications.   _______________________________________________________________________________   Procedure:           Pre-Anesthesia Assessment:                        - Prior to the procedure, a History and Physical was                        performed, and patient medications and allergies were                        reviewed. The patient is competent. The risks and                        benefits of the procedure and the sedation options and                         risks were discussed with the patient. All questions                        were answered and informed consent was obtained. Patient                        identification and proposed procedure were verified by                        the nurse in the pre-procedure area. Mental Status                        Examination: alert and oriented. Airway Examination:                        Mallampati Class II (the uvula but not tonsillar pillars                        visualized). Respiratory Examination: poor air movement.                        CV Examination: normal. ASA Grade Assessment: III - A                        patient with severe systemic disease. After reviewing                        the risks and benefits, the patient was deemed in                        satisfactory condition to undergo the procedure. The                        anesthesia plan was to use general anesthesia.                        Immediately prior to administration of medications, the                        patient was re-assessed for adequacy to receive                        sedatives. The heart rate, respiratory rate, oxygen                        saturations, blood pressure, adequacy of pulmonary                        ventilation, and response to care were monitored                        throughout the procedure. The physical status of the                        patient was re-assessed after the procedure. After                        obtaining informed consent, the endoscope was passed                        under direct vision. Throughout the procedure, the                        patient's blood pressure, pulse, and oxygen saturations                        were monitored continuously. The gastroscope was                        introduced through the mouth, and advanced to the second                        part of duodenum. The upper GI endoscopy was                        accomplished without difficulty. The patient tolerated                         the procedure well.                                                                                     Findings:        The patient was supine throughout. Evaluation of the abdomen        demonstrated that the balloon bumper had buried the the dermis.         films of the abdomen demonstrated two transgastric Solus stents as well        as the GJ tube. The gastroscope was passed revealing an unremarkable        esophagus. The stomach was notable for a deeply buried bumper and the        two Solus stents while the bulb and proximal duodenum were unremarkable        and patent. The existing tube was removed. To allow insufflation and        begin the process of healing a decision was made to deploy a 12/6 gc        OTSC. The clip was fashioned on the gastroscope and the defect targeted.        The clip deployed with excellent tissue apposition allowing for        insufflation. The stomach was insufflated to appose gastric and        abdominal walls. A new site just upstream of the first tract was located        in the antrum of the stomach with excellent transillumination, manual        external pressure and fluoroscopy for placement. The abdominal wall was        marked and prepped in a sterile manner. The area was anesthetized with 2        mL of 1% lidocaine. The trocar needle was introduced through the        abdominal wall and into the stomach under both fluoroscopic and direct        endoscopic view. A snare was introduced through the endoscope and opened        in the gastric lumen. The guide wire was passed through the trocar and        into the open snare. The snare was closed around the guide wire. The        endoscope and snare were removed, pulling the wire out through the        mouth. A skin incision was made at the site of needle insertion. The        externally removable 24 Fr gastrostomy tube was lubricated. The G-tube        was tied to the guide wire and pulled through  "the mouth and into the        stomach. The trocar needle was removed, and the gastrostomy tube was        pulled out from the stomach through the skin. Next we created a        gastropexy using three T tags triangulated adjacent to the fresh        gastrostomy tract. Next the existing tube was then exchanged over a wire        for a 22F sheath dilator. The sheath was passed across the pylorus and        an 0.035\" Glidewire was passed to the proximal jejunum under        fluoroscopic guidance. Over this wire an 18F 45cm one piece        gastrojejunostomy tube was positioned, with the tip in the proximal        jejunum, without gastric curl. The external bumper was positioned at        3-3.5cm. The final tension and compression of the abdominal wall by the        PEGJ tube and external bumper were checked and revealed that the bumper        was moderately tight and mildly deforming the skin. The feeding tube was        capped, and the tube site cleaned and dressed. We then packed the        initial gastrostomy with iodioform soaked Nu-gauze and dressed the wound.                                                                                     Impression:          - Existing GJ internal balloon buried near skin                         prompting tube removal and internal fistula closure by                        12/6 OTS clip (allowing for stomach insufflation)                        - Uncomplicated placement of an 18F 45cm one piece                        gastrojejunostomy through a fresh (adjacent) tract                        utilizing an adjunct gastropexy and techniques described                        above                        - Preexisting gastrostomy wound tract packed with                        iodoform soaked Nu-gauze   Recommendation:      - General anesthesia recovery with return to the floor                        when appropriate                        - G tube may be used for decompression and " medications                        without delay                        - Abdominal exams at 2 and 4 hours to ensure soft                        palpation without incresing abdominal pain and no                        evidence of bleeding; with reassuring findings, the J                        tube may be used for feeds as per nutrition                        - Wound care consult to provide instructions of                        management of the prexisting wound utilizing iodoform                        soaked Nu-gauze                        - The three white buttons along the gastrostomy                        represent the external bumpers of the T tags and these                        should be removed by cutting the underlying blue strings                        in two weeks                        - The findings and recommendations were discussed with                        the patient and their family                                                                                       electronically signed by REAGAN Will

## 2021-09-14 NOTE — PROGRESS NOTES
[facility]    Progress Note - [place] 3 Service        Date of Admission:  6/28/2021    Assessment & Plan             Dax Villareal is a 30 year old male with hx of alcohol use disorder admitted on 6/28/2021 after recent prolonged admission at [facility] in [place] who has severe acute alcoholic hepatitis c/b HE, ESRD requiring HD, and malnutrition in the setting of acute necrotizing pancreatitis, s/p PEG-J placement on 7/13. He arrived with acute respiratory failure and septic shock that have resolved, and his secondary bacterial peritonitis has been appropriately treated. He requires continued management for ESRD, hepatic encephalopathy, infected pancreatic pseudocyst, nutritional support via PEG-J, and persistent right pleural effusion. Clinically improving after multiple necrosectomies. Increasing leukocytosis and hypotension on 9/2 prompting resumption of IV antibiotics - has been stable since that time. Repeat CT with interval improvement in fluid collections.     Today:   - PEG replacement with fistula repair per GI done 9/13/21  - GI recommendations:   -- Continue wound care as per WOCN   -- Diet by mouth as tolerated, continue feeding tube via J-tube   -- Consider increase pain medication just prior to wound care for the healing purpose  - PT consult placed to work with patient on sitting upright   - HD tomorrow      #Septic Shock, resolved  #Necrotizing alcoholic pancreatitis w/ infected pseudocyst, improving   #Hepatic Fluid collection, stable to improving  #Secondary Bacterial Peritonitis 2/2 infected pseudocyst with VRE, improving  Admitted to the [facility] ICU on 6/28/2021 from [facility] in [place] for septic shock due to necrotizing pancreatitis. CT on admit showing mature collection with enhancing wall measuring ~[id number]. IR placed perc drain 6/29. Repeat imaging 7/18 revealed walled off necrotic collection amenable to endoscopic transluminal  drainage with cystgastrostomy stenting with necrosectomy on 7/21/21. Complete necrosectomy on 8/11. Antibiotics (cefepime and metronidazole) were discontinued on 8/25 after left flank drain removed, re-initiated in the setting of fever on 9/2. Favor abdominal etiology, repeat CT on 9/7 with interval improvement in fluid collections - will complete 7d course of abx per ID with EOT 9/8.   - Will likely need intermittent antibiotics with fluid collections occasionally with bacterial growth moving forward as to be expected in nec panc      # Hypovolemia  Patient with substantial daily NG output and poor PO. Also note significant weight loss over the past few weeks. May need intermittent boluses of normal saline for euvolemia.   - Weight 78kg on 8/13. Now 55 kg. Concern for weight loss vs. targeting dry weight.              - D/W Nephrology about concern. Asked to re-evaluate volume status vs. Weight as he seems to be hypotensive and symptomatic after large volume removals.   -Monitor vitals and output post dialysis, consider fluids prn.     #Gastric Outlet Obstruction  High G tube output due to the cystogastric connection/fluid drainage noted on 8/5. Per GI, possibly related to gastric outlet from severe abdominal inflammation and is expected. KUB negative for obstructive gas pattern. Continues to have mild to moderate NG output - stable to improved. Relieved with draining G tube to gravity when experiencing pain/nausea.      #Hyponatremia, stable  Patient with down-trending sodium the week of 8/27; likely due to substantial NG output and low solute intake and hepatic dysfunction. Stable 127-128.     #Right Pancreatic Hydrothorax, stable  Increased dyspnea with CXR on 7/19 with further imaging and thoracentesis confirming a hepatic/pancreatic hydrothorax. Thoracentesis PRN for dyspnea, most recently 9/3.     #Alcoholic Hepatitis  #Coagulation Defect  Received 1 week of steroids at [facility] for elevated INR and altered  mental status. Not a liver transplant candidate. Mental status improved with aggressive lactulose and rifaxamin, which was discontinued without return of AMS.     ESRD due to ATN  Hyperphosphatemia  Stage III RADHA due to ATN from septic shock without recovery for >3 months. Line is TDC from PTA. Nephrology dialyzed on Tu, Th, Sa schedule.  - Will need follow-up and outpatient dialysis  - Sevelemer powder TID with TF for hyperphosphatemia      #Anemia due to Chronic Illness  #Hematochezia, resolved  #Hemoperitoneum  Baseline hgb 17. Running between 6-8 in setting of chronic illness, new ESRD with epocrit per Nephrology, frequent lab draws and procedures. Hematochezia on 8/8 after starting standard intensity heparin, now resolved. Paracentesis on 8/21 with hemoperitoneum. No active bleeding. Required a unit of PRBC on 9/4 in the setting of several fluid boluses.   - Goal hgb > 7.0, trend Hgb  - Hgb has been stable.      #Alcohol Use Disorder  Counseled patient on complete alcohol cessation. Not interested in additional services at this time. Continue daily folic acid and thiamine.     #Lactic Acidosis  Intermittent Lactic acidosis in the setting of sepsis, ESRD, bleeding.     #Hypokalemia  Noted in the setting of high gastric output and improved once G-tube output decreased. CTM.      #Severe Malnutrition  Poor oral intake in the setting of necrotizing pancreatitis. PEG tube placed 7/13. Dietitian consulted and continuous tube feeds with goal of 65 ml/hr with continuous pancreatic enzymes  - Continuous tube feeds 50 mL overnight.        Diet: Adult Formula Drip Feeding: Continuous Vital 1.5; Jejunostomy; Goal Rate: 60; mL/hr; Medication - Feeding Tube Flush Frequency: At least 15-30 mL water before and after medication administration and with tube clogging; Amount to Send (Nutrition us...  Regular Diet Adult    DVT Prophylaxis: Pneumatic Compression Devices  Rdz Catheter: Not present  Fluids: None   Central Lines:  PRESENT  PICC Triple Lumen 06/28/21 Left-Site Assessment: WDL  CVC Double Lumen Right-Site Assessment: WDL  Code Status: Full Code      Disposition Plan   Expected discharge: 09/20/2021 recommended to transitional care unit once safe disposition plan/ TCU bed available.     The patient's care was discussed with the Attending Physician, Dr. Coon and Patient.    Tri Lozada MD  [facility] 3 Service  [facility]  Securely message with the Vocera Web Console (learn more here)  Text page via [facility] Paging/Directory  Please see sign in/sign out for up to date coverage information    Clinically Significant Risk Factors Present on Admission                ______________________________________________________________________    Interval History   Dax reports improved abdominal pain with the replacement of the G-tube. We discussed that his sister found him an electric wheelchair, however he will need to be able to sit upright to use. He is agreeable to having PT stop by to try to work on sitting upright. He otherwise denies fevers/chills, lightheadedness, worsening pain, or difficulty breathing.     Data reviewed today: I reviewed all medications, new labs and imaging results over the last 24 hours. I personally reviewed no images or EKG's today.    Physical Exam   Vital Signs: Temp: (!) 95.8  F (35.4  C) Temp src: Oral BP: 111/57 (MAP 75) Pulse: 102   Resp: 20 SpO2: 93 % O2 Device: Nasal cannula with humidification Oxygen Delivery: 4 LPM  Weight: 121 lbs 4.05 oz  Physical Exam  Vitals and nursing note reviewed.   Constitutional:       General: He is not in acute distress.  HENT:      Mouth/Throat:      Mouth: Mucous membranes are moist.   Eyes:      Conjunctiva/sclera: Conjunctivae normal.      Pupils: Pupils are equal, round, and reactive to light.   Cardiovascular:      Rate and Rhythm: Normal rate and regular rhythm.      Heart sounds: Normal heart sounds.   Pulmonary:       Effort: Pulmonary effort is normal.      Breath sounds: Normal breath sounds.   Abdominal:      General: Bowel sounds are normal.      Palpations: Abdomen is soft.      Tenderness: There is abdominal tenderness.      Comments: New PEG in place without surrounding drainage or erythema  Improved tenderness   Skin:     General: Skin is warm and dry.   Neurological:      Mental Status: He is alert. Mental status is at baseline.           Data   Recent Labs   Lab 09/14/21  0544 09/13/21  1545 09/13/21  0631 09/11/21  0609 09/10/21  0741 09/09/21  0800 09/09/21  0800   WBC  --   --  11.5*  --  11.3*  --  12.8*   HGB  --   --  8.2*  --  8.2*  --  8.9*   MCV  --   --  96  --  95  --  100   PLT  --   --  266  --  230  --  249   *  --  125* 128*  --   --  127*   POTASSIUM 5.2  --  5.9* 4.6  --   --  4.1   CHLORIDE 94  --  94 95  --   --  92*   CO2 20  --  22 22  --   --  22   BUN 35*  --  51* 24  --   --  27   CR 3.48*  --  4.59* 2.56*  --   --  2.47*   ANIONGAP 13  --  9 11  --   --  13   JANENE 9.2  --  9.9 9.1  --   --  9.1   * 81 84 123*  --    < > 145*    < > = values in this interval not displayed.     Recent Results (from the past 24 hour(s))   XR Surgery CESIA Fluoro L/T 5 Min w Stills    Narrative    This exam was marked as non-reportable because it will not be read by a   radiologist or a [place] non-radiologist provider.           Medications     - MEDICATION INSTRUCTIONS -       dextrose       heparin (porcine) 1,000 Units/hr (08/24/21 0907)       sodium bicarbonate  325 mg Per Feeding Tube Q4H    And     amylase-lipase-protease  1-2 capsule Per Feeding Tube Q4H     amylase-lipase-protease  2 capsule Oral TID w/meals     B and C vitamin Complex with folic acid  5 mL Per Feeding Tube Daily     fiber modular (NUTRISOURCE FIBER)  1 packet Per Feeding Tube TID     folic acid  1 mg Oral Daily     heparin lock flush  5-20 mL Intracatheter Q24H     melatonin  6 mg Oral At Bedtime     menthol   Transdermal Q8H      oxyCODONE  5 mg Oral Q8H     pantoprazole (PROTONIX) IV  40 mg Intravenous Daily with breakfast     protein modular  1 packet Per Feeding Tube Daily     sevelamer carbonate (RENVELA)  0.8 g Oral TID     sodium chloride (PF)  3 mL Intracatheter Q8H     sodium chloride (PF)  3 mL Intracatheter Q8H     sodium chloride (PF)  3 mL Intracatheter Q8H     vitamin B1  100 mg Oral Daily    Or     thiamine  100 mg Intravenous Daily

## 2021-09-14 NOTE — PROGRESS NOTES
GASTROENTEROLOGY PROGRESS NOTE    Date: 09/14/2021     ASSESSMENT:  30 year old male with a history of alcohol use disorder who presented to OSH 5/19 for abdominal pain, nausea and vomiting, and found to have alcohol hepatitis, ESLD, RADHA on HD, septic shock requiring pressors as well as necrotizing pancreatitis with large evolving necrotic collection. Transferred to [facility] for drainage of presumed infected necrotic collection. S/p EUS transluminal and percutaneous drainage.     #. Severe malnutrition in the context of chronic illness  #. Poor nutrition s/p PEG-J placement  GI renotified 9/13 for bulging G-tube with pain. Found to be displaced G-tube with bumper approaching the skin causing pain. Last week has tried pushing in, but failed. Thus, underwent G-tube removal and putting in new PEGJ tube at the new site 9/13/21 without immediate complication. Patient has big fistula with draining from the old G-tube site which will need continuation of wound care and nutrition for closure.      Recommendations:  -- The three white buttons along the gastrostomy represent the external bumpers of the T tags and these should be removed by cutting the underlying blue strings in two weeks (9/28/21)  -- Continue wound care as per WOCN  -- Diet by mouth as tolerated, continue feeding tube via J-tube  -- Consider increase pain medication just prior to wound care for the healing purpose    Gastroenterology follow up recommendations: As per previously scheduled (Dr. Chacko 9/30/21)     GI will sign off. Thank you for involving us in this patient's care. Please do not hesitate to contact the GI service with any questions or concerns.      Pt care plan discussed with Dr. Will, GI staff physician.    Patricio Mattson MD  Gastroenterology Fellow  Division of Gastroenterology, Hepatology and Nutrition  [facility]  Page [phone]  _______________________________________________________________    Subjective: Patient  "has significant pain on the old G-tube requesting increasing pain medication. Per RN, patient has been intermittently refuses wound dressing change due to pain. At encounter, he denies abdominal pain, no nausea, no vomiting. No fever.    Objective:  Blood pressure 104/43, pulse 101, temperature (!) 96  F (35.6  C), temperature source Oral, resp. rate 16, height 1.676 m (5' 6\"), weight 55 kg (121 lb 4.1 oz), SpO2 94 %.    Gen: A&Ox3, NAD  HEENT: sclera anicteric  CV: RRR  Lungs: breathing comfortably on room air  Abd: soft, nontender, distended, PEGJ tube in place without inflammation. Old G-tube site covered with gauze (patient refuses to be looked at)   Skin: no jaundice, no stigmata of chronic liver disease  MS: decreased muscle mass  Neuro: non focal, no asterixis     LABS:  BMP  Recent Labs   Lab 09/13/21  1545 09/13/21  0631 09/11/21  2151 09/11/21  0609 09/09/21  0800   NA  --  125*  --  128* 127*   POTASSIUM  --  5.9*  --  4.6 4.1   CHLORIDE  --  94  --  95 92*   JANENE  --  9.9  --  9.1 9.1   CO2  --  22  --  22 22   BUN  --  51*  --  24 27   CR  --  4.59*  --  2.56* 2.47*   GLC 81 84 98 123* 145*     CBC  Recent Labs   Lab 09/13/21  0631 09/10/21  0741 09/09/21  0800   WBC 11.5* 11.3* 12.8*   RBC 2.69* 2.73* 2.94*   HGB 8.2* 8.2* 8.9*   HCT 25.9* 26.0* 29.3*   MCV 96 95 100   MCH 30.5 30.0 30.3   MCHC 31.7 31.5 30.4*   RDW 15.7* 16.2* 16.5*    230 249     INRNo lab results found in last 7 days.  LFTsNo lab results found in last 7 days.   PANCNo lab results found in last 7 days.    IMAGING: no new in 24 hours.      "

## 2021-09-14 NOTE — ANESTHESIA POSTPROCEDURE EVALUATION
Patient: Dax Villareal    Procedure(s):  ESOPHAGOGASTRODUODENOSCOPY (EGD), GASTROSTOMY TUBE REMOVAL, FISTULAR CLOSURE ENDOSCOPIC. PEG/J PLACEMENT.    Diagnosis:PEG tube malfunction (H) [K94.23]  Diagnosis Additional Information: No value filed.    Anesthesia Type:  No value filed.    Note:  Disposition: Inpatient   Postop Pain Control: Uneventful            Sign Out: Well controlled pain   PONV: No   Neuro/Psych: Uneventful            Sign Out: Acceptable/Baseline neuro status   Airway/Respiratory: Uneventful            Sign Out: Acceptable/Baseline resp. status   CV/Hemodynamics: Uneventful            Sign Out: Acceptable CV status; No obvious hypovolemia; No obvious fluid overload   Other NRE: NONE   DID A NON-ROUTINE EVENT OCCUR? No    Event details/Postop Comments:  I assessed the patient in PACU prior to discharge.  The patient was awake and alert with minimal to moderate pain which was well controlled with medications.  The patient denied any significant nausea.  The patient's heart rate and blood pressure with consistent with his preoperative measurements, and he was breathing comfortably without any signs of respiratory distress.  There were no readily apparent anesthetic complications.  All his questions were answered.           Last vitals:  Vitals Value Taken Time   BP 98/46 09/13/21 1945   Temp 36.5  C (97.7  F) 09/13/21 1906   Pulse 107 09/13/21 1947   Resp 13 09/13/21 1947   SpO2 92 % 09/13/21 1947   Vitals shown include unvalidated device data.    Electronically Signed By: Jose Funes MD  September 13, 2021  7:48 PM

## 2021-09-14 NOTE — PROGRESS NOTES
Pt called for pain med right at change of morning shift. When nurse explained that both PRN and scheduled pain med were not due at the time. Pt was frustrated, wanted to be left alone to sleep and refused to discuss scheduled am med.

## 2021-09-14 NOTE — PROGRESS NOTES
"CLINICAL NUTRITION SERVICES - REASSESSMENT NOTE     Nutrition Prescription    RECOMMENDATIONS FOR MDs/PROVIDERS TO ORDER:  1. Continue with EN support to meet 100% of needs. Phos has been elevated again. - Discussed elevated phos with medicine team today. Also Per nephrology note today, \" elevated phos likely related to diet and can change TF to renal formula when restarted \". Previously patient was not able to tolerate a renal base formula due to ongoing nausea with high fat base formula.   --MD please obtain a recent phos level after HD tomorrow. If ongoing elevation post HD, will switch TF formulary back to a renal base regimen.    2. Sevelamer can clog tubes.  Do not give via FT. Nephrology to assess for alternative phos binders that can be given via FT     3. Patient currently on limited Free water flushes ( only @ 20 ml /hr @ continuous rate via feed and flush, Providing only 480 ml water daily ). Patient with significant wt loss since admit. He is anuric and on HD. Therefore FURTHER NEED TO INCREASE Free Water Flushes PER TEAM or nephrology.   --For full hydration needs, patient would need additional 1100 ml free water daily ( ~ 40 ml/hr via feed and flush) + PO     4. Ordered metabolic Cart study to accurately assess estimated needs given significant wt loss since admit     Malnutrition Status:    Severe malnutrition in the context of acute on chronic illness     Recommendations already ordered by Registered Dietitian (RD):  EN:   --Today will Continue with Vital 1.5, since patient is tolerating this formula well at goal rate @ 60 ml/hr to optimize intake, ( per 55 kg lowest wt this admit on 9/13).     --Vital 1.5 Leno @ goal of  60 ml/hr (1440 ml/day) to provide: 2160 kcal's ( 39 kcal/kg), 97 gm PRO ( 1.8 gm/kg), 1100 ml free H2O, 269 gm CHO, 82 gms fat and 8 gm soluble fiber daily.    Modules:   --Continue with Nutrisource fiber, one packet TID via J-port for diarrhea  --Continue with Prosource TF free one " packet daily ( 90 kcal, 15 gm protein),   TF+ prosource = 2.0 gm protein/kg.       PERT:   Continue with Creon enzyme+ bicarb solutions every 4 hours via FT     Future/Additional Recommendations:  Monitor phos/mg++/K+ trend with non renal formula   Monitor loose stool trend, need to further increase soluble fiber + need to increase PERT    Enteral nutrition recommendation:  Access: Jejunostomy tube of the G/J tube  Dosing wt: 55 lowest wt on 9/13/21      EN: Re-start  tube feeds with renal formula, Novasource Renal @ 25 ml/hr, advance by 10 ml every 4 hours to new goal at 45 ml/hr.     Novasource Renal @ 45 ml/hr (1080 ml) provides 2160 kcal (39 kcal/kg)), 98 gm pro (1.8 gm/kg ), 198 gm CHO, 108 gm fat, 774 ml free water, and 0 gm fiber daily.          EVALUATION OF THE PROGRESS TOWARD GOALS   Diet:   Regular diet     Nutrition Support: ( on TF since 6/30 admit)  Dosing wt: Previously 63 kg lowest wt this admit on 9/2/21  Access: Jejunostomy tube of PEG/J ( G-tube open to gravity at all times per RN report).     EN: Vital 1.5 @ goal of  60 ml/hr,  Provision:  (1440 ml/day), 2160 kcal's (34 kcal/kg), 97 gm PRO (1.5 gm/kg), 82 gms fat /day, 1100 ml free H2O, 269 gm CHO, and 8 gm soluble fiber daily.    Modules:   --Nutrisource fiber, 1 packet TID   --Prosource TF Free, 1 packet daily via FT ( 90 kcal + 15 gm protein) = 112 gm protein (1.8 gm/kg).     FWF: @ 20 ml/hr via feed and flush continuous rate for patency ( patient on HD, further water flush adjustment per team).     PERT: Creon 24, 2 capsules + 325 mg bicarb solution, Q 4 hours via FT->  providing 3512 units lipase/total gm fat in tube feeds.      Intake:   PO: 0-bites only. Declined bkf and lunch today. Visited with patient briefly. Patient wanted to rest.     EN:  Tolerating EN. Per discussion with RN, patient with no N/V. G-tube is open to gravity at all times. Patient frequently holds TF during HD. Has not been getting adequate daily goal volume.       Average 6 day of EN intake (856 ml daily) ( met 59% of the TF goal volume) => provided on average ~ 1283 kcal/day and 57 gm protein/day.    TF + Prosource Free => provided 1373 kcal/day (22 kcal/kg) and 72 gm protein ( 1.1 gm/kg).       Updated 9/3/21: ASSESSED NUTRITION NEEDS per 55 kg (85% IBW) lowest wt this admit on 9/13:   Estimated Energy Needs: 1925- 2200 kcals/day, (35 -40  kcals/kg)   Justification: Hypermetabolism with chronic illness, ( pancreatitis/HD/ESLD)  Estimated Protein Needs:  g/day (1.5 -2.0 g/kg)   Justification: Pancreatitis and Hypercatabolism with critical illness / HD repletion,    Estimated Fluid Needs: Per provider pending fluid status (ESLD/HD)      NEW FINDINGS   Chart reviewed:   --Prolonged hospitalization with septic shock.   --Patient was admitted on 6/28/2021 with acute respiratory failure and septic shock, necrotizing pancreatitis with infected pseudocyst, Secondary bacterial peritonitis due to infected pseudocyst and decompensated liver.     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     --Admitted secondary to SBP (treated)  --ESRD ( Anuric and on HD)  --HE, Mental status improved with aggressive lactulose and rifaximin, which was discontinued.    --Necrotizing pancreatitis with infected pancreatic pseudocyst s/p RP Drain and Cystogastrostomy, and persistent right pleural effusion. Had GI stenting of pancreas x4. Multiple Necrosectomies, last one on 8/11. 8/25 - IR perc drain removed, antibiotics stopped 8/25    --Resp: On O2 (3-4 liters) due to hepatic and pancreatic hydrothorax, on thoracentesis PRN  --Increasing leukocytosis and hypotension on 9/2 prompting resumption of IV antibiotics      WOC RN note on 9/13:   Peg tube wounds due to Moisture Associated Skin Damage (MASD)  Status: evolving and developed large abscess around the tube, hypergranular tissue, noted increase  drainage around the tube      9/3-Treated with silver nitrate stick  - Treated with silver nitrate    WT trend:   Pre HD run weight 55 kg (21) . Admission weight 80.2 kg (21). Significant wt loss since admit ( 25 kg wt decline in 2.5 month, 31% wt ). Patient with substantial high daily g-tube output due to GOO and frequent hold on TF support.     Meds:  Sodium Bicarbonate/Creon with TFs, Creon with meals,   Nephronex 5 ml daily per FT, calcium carbonate 500 mg daily, Sevelamer carbonate 0.8 gm packet TID PO  Remains on  thiamine, Folate.    Labs:   CRP: 45, WBC:11.5  (Elevated),   Fecal elastase: 162 (L) on    BUN:35, Cr: 3.48, Na+: 127(L)-> On HD ->  (K+:5.2,  Mg++:2.2, Phos: 9.0 on  yesterday)  Bicarb: 20 (normal range)   Total bili: 1.4 (on ), INR: 1.81 (on 9/3)   B (elevated)     GI:  Off Lactulose/Rifaxamin. Having 1-5 daily stool on average despite Nutrisource fiber + PERT    MALNUTRITION  % Intake: On TF's with frequent interruptions, <75%>1 month - Severe   % Weight Loss: 31% net wt loss since admit ~ 3 month   Subcutaneous Fat Loss: Global Severe / cachectic appearing  Muscle Loss: Global Severe / cachectic appearing  Fluid Accumulation/Edema: 1+ Generalized edema ( trace), 0 BLE   Malnutrition Diagnosis: Severe malnutrition in the context of Chronic condition     Previous Goals   Total avg nutritional intake to meet a minimum of 25 kcal/kg and 1.3 gm PRO/kg daily (per dosing wt 67.5 kg).  Evaluation: Not met     Previous Nutrition Diagnosis  Inadequate protein-energy intake related to not meeting daily goal TF volume and ongoing poor po as evidenced by average 7 days TF met 47% of goal TF volume and PO remained minimal with significant wt loss since admit over the past 2 month.     Evaluation: No change    CURRENT NUTRITION DIAGNOSIS  Wt loss related to Inadequate protein-energy intake associated with daily interruption to TF support, ( patient holds TF frequently for HD and  other daily personal cares)  and likely increased metabolic demand with chronic illness,  not meeting daily goal TF volume and ongoing poor po as evidenced by average 6 days TF met 59% of goal TF volume and PO remained minimal with associated significant wt loss of 31% body wt since admit over the past ~ 3 month.     INTERVENTIONS  Implementation  Enteral Nutrition:    EN: Previously TF was switched over to Nova source renal last week, given elevated phos levels. However patient with nausea and inability to tolerate renal formula. TF switched back to Vital 1.5 on 9/3. Now with elevated phos levels. Will await post HD phos level tomorrow     Feeding tube flush - continue with feed and flush, @ 20 ml/hr patency. Patient is anuric and on HD.       Modules: Nutrisource fiber TID via FT for frequent loose stool + 1 packet of Prosource TF free daily    Goals  Total avg nutritional intake to meet a minimum of 35 kcal/kg and 1.5 gm PRO/kg daily (per dosing wt 55 kg dry wt on 9/13/21 ).    Monitoring/Evaluation  Progress toward goals will be monitored and evaluated per protocol.      Jesica Blunt RD/MARYELLEN  Pager [phone]

## 2021-09-15 ENCOUNTER — APPOINTMENT (OUTPATIENT)
Dept: PHYSICAL THERAPY | Facility: CLINIC | Age: 31
End: 2021-09-15
Attending: INTERNAL MEDICINE
Payer: MEDICAID

## 2021-09-15 LAB
ANION GAP SERPL CALCULATED.3IONS-SCNC: 13 MMOL/L (ref 3–14)
BUN SERPL-MCNC: 52 MG/DL (ref 7–30)
CALCIUM SERPL-MCNC: 9.3 MG/DL (ref 8.5–10.1)
CHLORIDE BLD-SCNC: 92 MMOL/L (ref 94–109)
CO2 SERPL-SCNC: 21 MMOL/L (ref 20–32)
CREAT SERPL-MCNC: 4 MG/DL (ref 0.66–1.25)
GFR SERPL CREATININE-BSD FRML MDRD: 19 ML/MIN/1.73M2
GLUCOSE BLD-MCNC: 142 MG/DL (ref 70–99)
HOLD SPECIMEN: NORMAL
MAGNESIUM SERPL-MCNC: 2.3 MG/DL (ref 1.6–2.3)
PHOSPHATE SERPL-MCNC: 8.6 MG/DL (ref 2.5–4.5)
POTASSIUM BLD-SCNC: 5.5 MMOL/L (ref 3.4–5.3)
SODIUM SERPL-SCNC: 126 MMOL/L (ref 133–144)

## 2021-09-15 PROCEDURE — 99233 SBSQ HOSP IP/OBS HIGH 50: CPT | Performed by: INTERNAL MEDICINE

## 2021-09-15 PROCEDURE — 90937 HEMODIALYSIS REPEATED EVAL: CPT

## 2021-09-15 PROCEDURE — 83735 ASSAY OF MAGNESIUM: CPT | Performed by: INTERNAL MEDICINE

## 2021-09-15 PROCEDURE — 634N000001 HC RX 634: Performed by: CLINICAL NURSE SPECIALIST

## 2021-09-15 PROCEDURE — 250N000013 HC RX MED GY IP 250 OP 250 PS 637: Performed by: INTERNAL MEDICINE

## 2021-09-15 PROCEDURE — 84100 ASSAY OF PHOSPHORUS: CPT | Performed by: CLINICAL NURSE SPECIALIST

## 2021-09-15 PROCEDURE — 99232 SBSQ HOSP IP/OBS MODERATE 35: CPT | Mod: GC | Performed by: HOSPITALIST

## 2021-09-15 PROCEDURE — 120N000002 HC R&B MED SURG/OB UMMC

## 2021-09-15 PROCEDURE — 80048 BASIC METABOLIC PNL TOTAL CA: CPT | Performed by: INTERNAL MEDICINE

## 2021-09-15 PROCEDURE — 250N000011 HC RX IP 250 OP 636: Performed by: INTERNAL MEDICINE

## 2021-09-15 PROCEDURE — 36592 COLLECT BLOOD FROM PICC: CPT | Performed by: INTERNAL MEDICINE

## 2021-09-15 PROCEDURE — 250N000011 HC RX IP 250 OP 636: Performed by: CLINICAL NURSE SPECIALIST

## 2021-09-15 PROCEDURE — 258N000003 HC RX IP 258 OP 636: Performed by: CLINICAL NURSE SPECIALIST

## 2021-09-15 PROCEDURE — 97110 THERAPEUTIC EXERCISES: CPT | Mod: GP

## 2021-09-15 PROCEDURE — C9113 INJ PANTOPRAZOLE SODIUM, VIA: HCPCS | Performed by: INTERNAL MEDICINE

## 2021-09-15 RX ORDER — HEPARIN SODIUM 1000 [USP'U]/ML
500 INJECTION, SOLUTION INTRAVENOUS; SUBCUTANEOUS CONTINUOUS
Status: DISCONTINUED | OUTPATIENT
Start: 2021-09-15 | End: 2021-09-15

## 2021-09-15 RX ORDER — HEPARIN SODIUM 1000 [USP'U]/ML
500 INJECTION, SOLUTION INTRAVENOUS; SUBCUTANEOUS
Status: COMPLETED | OUTPATIENT
Start: 2021-09-15 | End: 2021-09-15

## 2021-09-15 RX ADMIN — PANCRELIPASE 2 CAPSULE: 24000; 76000; 120000 CAPSULE, DELAYED RELEASE PELLETS ORAL at 12:45

## 2021-09-15 RX ADMIN — SODIUM BICARBONATE 325 MG: 325 TABLET ORAL at 02:20

## 2021-09-15 RX ADMIN — SODIUM BICARBONATE 325 MG: 325 TABLET ORAL at 12:45

## 2021-09-15 RX ADMIN — PANCRELIPASE 2 CAPSULE: 24000; 76000; 120000 CAPSULE, DELAYED RELEASE PELLETS ORAL at 06:36

## 2021-09-15 RX ADMIN — Medication 1 PACKET: at 12:43

## 2021-09-15 RX ADMIN — HYDROXYZINE HYDROCHLORIDE 25 MG: 25 TABLET, FILM COATED ORAL at 19:52

## 2021-09-15 RX ADMIN — EPOETIN ALFA-EPBX 5000 UNITS: 10000 INJECTION, SOLUTION INTRAVENOUS; SUBCUTANEOUS at 10:40

## 2021-09-15 RX ADMIN — HYDROMORPHONE HYDROCHLORIDE 1 MG: 1 SOLUTION ORAL at 21:54

## 2021-09-15 RX ADMIN — HYDROXYZINE HYDROCHLORIDE 50 MG: 25 TABLET, FILM COATED ORAL at 10:59

## 2021-09-15 RX ADMIN — SODIUM BICARBONATE 325 MG: 325 TABLET ORAL at 17:05

## 2021-09-15 RX ADMIN — PANCRELIPASE 2 CAPSULE: 24000; 76000; 120000 CAPSULE, DELAYED RELEASE PELLETS ORAL at 17:06

## 2021-09-15 RX ADMIN — SEVELAMER CARBONATE 0.8 G: 800 POWDER, FOR SUSPENSION ORAL at 21:06

## 2021-09-15 RX ADMIN — PANTOPRAZOLE SODIUM 40 MG: 40 INJECTION, POWDER, FOR SOLUTION INTRAVENOUS at 12:42

## 2021-09-15 RX ADMIN — Medication 5 ML: at 05:35

## 2021-09-15 RX ADMIN — PANCRELIPASE 2 CAPSULE: 24000; 76000; 120000 CAPSULE, DELAYED RELEASE PELLETS ORAL at 02:20

## 2021-09-15 RX ADMIN — SODIUM CHLORIDE 300 ML: 9 INJECTION, SOLUTION INTRAVENOUS at 08:54

## 2021-09-15 RX ADMIN — ACETAMINOPHEN 325 MG: 325 SOLUTION ORAL at 19:52

## 2021-09-15 RX ADMIN — HYDROMORPHONE HYDROCHLORIDE 1 MG: 1 SOLUTION ORAL at 00:31

## 2021-09-15 RX ADMIN — SODIUM BICARBONATE 325 MG: 325 TABLET ORAL at 06:36

## 2021-09-15 RX ADMIN — Medication 5 ML: at 12:41

## 2021-09-15 RX ADMIN — OXYCODONE HYDROCHLORIDE 5 MG: 5 SOLUTION ORAL at 04:56

## 2021-09-15 RX ADMIN — HEPARIN SODIUM 500 UNITS: 1000 INJECTION INTRAVENOUS; SUBCUTANEOUS at 08:54

## 2021-09-15 RX ADMIN — OXYCODONE HYDROCHLORIDE 5 MG: 5 SOLUTION ORAL at 12:41

## 2021-09-15 RX ADMIN — FOLIC ACID 1 MG: 1 TABLET ORAL at 12:42

## 2021-09-15 RX ADMIN — HYDROXYZINE HYDROCHLORIDE 50 MG: 25 TABLET, FILM COATED ORAL at 00:53

## 2021-09-15 RX ADMIN — HYDROMORPHONE HYDROCHLORIDE 1 MG: 1 SOLUTION ORAL at 11:26

## 2021-09-15 RX ADMIN — MELATONIN TAB 3 MG 6 MG: 3 TAB at 00:31

## 2021-09-15 RX ADMIN — SODIUM BICARBONATE 325 MG: 325 TABLET ORAL at 21:03

## 2021-09-15 RX ADMIN — OXYCODONE HYDROCHLORIDE 5 MG: 5 SOLUTION ORAL at 20:30

## 2021-09-15 RX ADMIN — PANCRELIPASE 2 CAPSULE: 24000; 76000; 120000 CAPSULE, DELAYED RELEASE PELLETS ORAL at 21:04

## 2021-09-15 RX ADMIN — SEVELAMER CARBONATE 0.8 G: 800 POWDER, FOR SUSPENSION ORAL at 17:05

## 2021-09-15 RX ADMIN — HEPARIN SODIUM 500 UNITS/HR: 1000 INJECTION INTRAVENOUS; SUBCUTANEOUS at 08:57

## 2021-09-15 RX ADMIN — SEVELAMER CARBONATE 0.8 G: 800 POWDER, FOR SUSPENSION ORAL at 12:42

## 2021-09-15 RX ADMIN — SODIUM CHLORIDE 250 ML: 9 INJECTION, SOLUTION INTRAVENOUS at 08:54

## 2021-09-15 RX ADMIN — CYCLOBENZAPRINE 10 MG: 10 TABLET, FILM COATED ORAL at 17:05

## 2021-09-15 RX ADMIN — THIAMINE HCL TAB 100 MG 100 MG: 100 TAB at 12:41

## 2021-09-15 RX ADMIN — HYDROMORPHONE HYDROCHLORIDE 1 MG: 1 SOLUTION ORAL at 17:05

## 2021-09-15 ASSESSMENT — ACTIVITIES OF DAILY LIVING (ADL)
ADLS_ACUITY_SCORE: 22
ADLS_ACUITY_SCORE: 17
ADLS_ACUITY_SCORE: 22

## 2021-09-15 ASSESSMENT — MIFFLIN-ST. JEOR
SCORE: 1612.75

## 2021-09-15 NOTE — PROGRESS NOTES
Nephrology Progress Note  09/15/2021         Dax Villareal is a 30 year old male with h/o ETOH hepatitis, end stage liver disease, RADHA on HD, transferred from [facility] in [place] for septic shock on 6/28/21 for treatment of necrotizing pancreatitis and decompensated liver disease. He was initially admitted to [facility] 5/19/21 and started on RRT 5/26/2021.        Interval History :   Mr Villareal had necrosectomy for 4th time on 8/11 with no more anticipated.  Seen on HD this am, has hyponatremia and hyperkalemia, likely related to TF which can be changed to Nepro/renal formula.  He is particular about frequency and length of runs but will see if we can get him to run extra tomorrow due to the above issues.        Assessment & Recommendations:   ESRD-Cr was 0.8 as recently as 5/19/2021 but now HD dependent for 3 months, started RRT at [facility] prior to transfer to [facility] on 5/26.  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  continuing restorative cares for now, BP's have been stable enough to support HD.  Had GI stenting of pancreas x4.                 -Line is TDC from PTA               -HD today, will see if we can convince him to run again tomorrow to improve Na and Phos, would change to nepro TF.       Volume status-Anuric, wt at low for his course, not pulling fluid as his drain output is significant.       Electrolytes/pH-Na chronically low, mildly hyperkalemic at 5.5 today, will run today and continue to evaluate daily.       Ca/phos/pth-Phos has been up, likely related to diet, can change TF to renal formula.                    Anemia-Hgb low at 8.2, acute management per team, last PRBC's 7/14.  Started on EPO 5k with runs, iron sats 43%      Nutrition-Taking PO, appetite poor lately, has been NPO due to G tube placement yesterday, would change to renal formula to limit K and Phos.       Seen and discussed with   "Malepati      Recommendations were communicated to primary team via note       SOFIA Marlow CNS  Clinical Nurse Specialist  [phone]    Review of Systems:   I reviewed the following systems:  Gen: No fevers or chills  CV: No CP at rest  Resp: No SOB at rest  GI: No N/V    Physical Exam:   I/O last 3 completed shifts:  In: 2206 [P.O.:240; I.V.:20; NG/GT:566]  Out: 550 [Urine:25; Emesis/NG output:525]   /47 (BP Location: Left arm)   Pulse 105   Temp (!) 96  F (35.6  C) (Oral)   Resp 18   Ht 1.676 m (5' 6\")   Wt 71.5 kg (157 lb 10.1 oz)   SpO2 95%   BMI 25.44 kg/m       GENERAL APPEARANCE: Lying in bed, in no distress.    EYES:  scleral icterus, pupils equal  Pulmonary: decreased BS   CV: tachy   - Edema - no LE edema  GI: mild distention, non-tender. + tenderness at G-tube site  MS: no evidence of inflammation in joints, no muscle tenderness  SKIN: no rash, warm, dry, no cyanosis, + G tube erythremia   NEURO: alert/interactive  ACCESS: Right TDC with no induration or erythema    Labs:   All labs reviewed by me  Electrolytes/Renal - Recent Labs   Lab Test 09/15/21  0541 09/14/21  0544 09/13/21  1545 09/13/21  0631 09/11/21  2151 09/11/21  0609 09/10/21  0741   * 127*  --  125*  --    < >  --    POTASSIUM 5.5* 5.2  --  5.9*  --    < >  --    CHLORIDE 92* 94  --  94  --    < >  --    CO2 21 20  --  22  --    < >  --    BUN 52* 35*  --  51*  --    < >  --    CR 4.00* 3.48*  --  4.59*  --    < >  --    * 206* 81 84   < >   < >  --    JANENE 9.3 9.2  --  9.9  --    < >  --    MAG 2.3 2.2  --  2.2  --    < > 2.1   PHOS 8.6*  --   --  9.0*  --   --  6.9*    < > = values in this interval not displayed.       CBC -   Recent Labs   Lab Test 09/13/21  0631 09/10/21  0741 09/09/21  0800   WBC 11.5* 11.3* 12.8*   HGB 8.2* 8.2* 8.9*    230 249       LFTs -   Recent Labs   Lab Test 09/04/21  0414 09/03/21  0523 09/02/21  1730   ALKPHOS 81 95 120   BILITOTAL 1.4* 2.0* 2.2*   ALT 29 31 40   AST " 38 39 57*   PROTTOTAL 8.8 9.2* 10.7*   ALBUMIN 2.9* 2.3* 2.7*       Iron Panel -   Recent Labs   Lab Test 08/14/21 2055 07/19/21  0632   IRON 28* 31*   IRONSAT 21 43   JERRELL 2,186*  --            Current Medications:    sodium bicarbonate  325 mg Per Feeding Tube Q4H    And     amylase-lipase-protease  1-2 capsule Per Feeding Tube Q4H     amylase-lipase-protease  2 capsule Oral TID w/meals     B and C vitamin Complex with folic acid  5 mL Per Feeding Tube Daily     fiber modular (NUTRISOURCE FIBER)  1 packet Per Feeding Tube TID     folic acid  1 mg Oral Daily     heparin lock flush  5-20 mL Intracatheter Q24H     melatonin  6 mg Oral At Bedtime     menthol   Transdermal Q8H     oxyCODONE  5 mg Oral Q8H     pantoprazole (PROTONIX) IV  40 mg Intravenous Daily with breakfast     protein modular  1 packet Per Feeding Tube Daily     sevelamer carbonate (RENVELA)  0.8 g Oral TID     sodium chloride (PF)  3 mL Intracatheter Q8H     sodium chloride (PF)  3 mL Intracatheter Q8H     sodium chloride (PF)  3 mL Intracatheter Q8H     vitamin B1  100 mg Oral Daily    Or     thiamine  100 mg Intravenous Daily       - MEDICATION INSTRUCTIONS -       dextrose       heparin (porcine) 1,000 Units/hr (08/24/21 0907)

## 2021-09-15 NOTE — PLAN OF CARE
"Assumed cares 0700 to 1900.     /48 (BP Location: Right arm)   Pulse 110   Temp 97.3  F (36.3  C) (Oral)   Resp 18   Ht 1.676 m (5' 6\")   Wt 71.5 kg (157 lb 10.1 oz)   SpO2 94%   BMI 25.44 kg/m       Pain: Reported in abd and back. Admin scheduled oxy and PRN dilaudid today. Also admin PRN flexeril x 1.  Neuro: Oriented. Uncooperative with some cares.  Respiratory: Crackles in R lung lobes, on 4-6L O2.  Cardiac/Neurovascular: HR elevated. Pulses WDL.   GI/: Abd distended/contour irregular. G/J tube, pt refused dressing change despite drainage present on current dressing. G tube to gravity. Cont TFs via J. No UOP, pt on HD.  Nutrition: Poor PO intake.  Activity: Refused q2h turns.  Skin: Refused full skin assessment. Refused dressing changes; dressing visible on R abd that is clean/dry/intact. Dressing on G/J is visibly soiled but pt refusing for it to be changed, as noted. Right around change of shift, however, dressings/wound site leaking, writer quickly changed and reported off to next RN to do more thorough site cleansing.  Lines: PICC in LUE, caps changed. Tunneled CVC in R chest for HD, site WDL.  Events this shift: Dialysis today.      Plan: Cont to monitor.    Iza Johnson RN on 9/15/2021 at 6:29 PM      "

## 2021-09-15 NOTE — PROVIDER NOTIFICATION
D/I: Pt is alert and oriented x4. Tachycardic and dyspneic on 3L oxygen via NC over night. Right Chest dialysis access intact. New GJ tube patent with J--port running TF @ 60mL/hr with solution + enzymes/NaBicarb Q4h. G-port to gravity with 250 ml output. Old GJ site dressing CDI. Scheduled oxycodone given for pain and Dilaudid oral PRN. Skin is jaundiced and fragile. Pt repositions independently in bed.   P: Will continue to monitor pt and follow POC. Dialysis today.

## 2021-09-15 NOTE — PLAN OF CARE
"Assumed cares [id number]. Pt was checked hourly on shift    /50 (BP Location: Right arm)   Pulse 98   Temp (!) 96.3  F (35.7  C) (Oral)   Resp 20   Ht 1.676 m (5' 6\")   Wt 59 kg (130 lb 1.1 oz)   SpO2 94%   BMI 20.99 kg/m       Pt A&O x4; lethargic during  shift; arouses easily. Tachycardiac, other VSS on 4L NC. Pt denies SOB and chest pain. C/O generalized body pain, oral dilaudid & oxycodone given x1; flexeril given x1. Old PEG tube site leaking, dressing changed x1; pt refused packing dressing to be changed out; pt refused full cleaning around site & barrier cream. New PEG tube: jejunum infusing TF at goal rate of 60mL/hr with NiBicarb & Creon; gastric open to drain; dressing changed, C/D/I. No urine output on shift. No BM. Triple lumen PICC in-place; flushed & HL. CVC in-place. Pt refused repositioning. Appears to be resting between cares.     Events: dialysis tomorrow at 0840.    Plan of Care: Continuing monitoring patient and notify MD of any changes.    Erna Eubanks RN on 9/14/2021 at 10:58 PM    "

## 2021-09-15 NOTE — PROGRESS NOTES
[facility]    Progress Note - [place] 3 Service        Date of Admission:  6/28/2021    Assessment & Plan           Dax Villareal is a 30 year old male with hx of alcohol use disorder admitted on 6/28/2021 after recent prolonged admission at [facility] in [place] who has severe acute alcoholic hepatitis c/b HE, ESRD requiring HD, and malnutrition in the setting of acute necrotizing pancreatitis, s/p PEG-J placement on 7/13. He arrived with acute respiratory failure and septic shock that have resolved, and his secondary bacterial peritonitis has been appropriately treated. He requires continued management for ESRD, hepatic encephalopathy, infected pancreatic pseudocyst, nutritional support via PEG-J, and persistent right pleural effusion. Clinically improving after multiple necrosectomies. Increasing leukocytosis and hypotension on 9/2 prompting resumption of IV antibiotics - has been stable since that time. Repeat CT with interval improvement in fluid collections.     Today:   - PT consult placed to work with patient on sitting upright   - HD today  without fluid removal   - Appreciate WOC for G-tube wound      #Septic Shock, resolved  #Necrotizing alcoholic pancreatitis w/ infected pseudocyst, improving   #Hepatic Fluid collection, stable to improving  #Secondary Bacterial Peritonitis 2/2 infected pseudocyst with VRE, improving  Admitted to the [facility] ICU on 6/28/2021 from [facility] in [place] for septic shock due to necrotizing pancreatitis. CT on admit showing mature collection with enhancing wall measuring ~[id number]. IR placed perc drain 6/29. Repeat imaging 7/18 revealed walled off necrotic collection amenable to endoscopic transluminal drainage with cystgastrostomy stenting with necrosectomy on 7/21/21. Complete necrosectomy on 8/11. Antibiotics (cefepime and metronidazole) were discontinued on 8/25 after left flank drain removed, re-initiated in the  setting of fever on 9/2. Favor abdominal etiology, repeat CT on 9/7 with interval improvement in fluid collections - will complete 7d course of abx per ID with EOT 9/8.   - Will likely need intermittent antibiotics with fluid collections occasionally with bacterial growth moving forward as to be expected in nec panc      # Hypovolemia  Patient with substantial daily NG output and poor PO. Also note significant weight loss over the past few weeks. May need intermittent boluses of normal saline for euvolemia.   - Weight 78kg on 8/13. Now 55 kg. Concern for weight loss vs. targeting dry weight.              - D/W Nephrology about concern. Asked to re-evaluate volume status vs. Weight as he seems to be hypotensive and symptomatic after large volume removals.   -Monitor vitals and output post dialysis, consider fluids prn.     #Gastric Outlet Obstruction  High G tube output due to the cystogastric connection/fluid drainage noted on 8/5. Per GI, possibly related to gastric outlet from severe abdominal inflammation and is expected. KUB negative for obstructive gas pattern. Continues to have mild to moderate NG output - stable to improved. Relieved with draining G tube to gravity when experiencing pain/nausea.      #Hyponatremia, stable  Patient with down-trending sodium the week of 8/27; likely due to substantial NG output and low solute intake and hepatic dysfunction. Stable 127-128.     #Right Pancreatic Hydrothorax, stable  Increased dyspnea with CXR on 7/19 with further imaging and thoracentesis confirming a hepatic/pancreatic hydrothorax. Thoracentesis PRN for dyspnea, most recently 9/3.     #Alcoholic Hepatitis  #Coagulation Defect  Received 1 week of steroids at [facility] for elevated INR and altered mental status. Not a liver transplant candidate. Mental status improved with aggressive lactulose and rifaxamin, which was discontinued without return of AMS.     ESRD due to ATN  Hyperphosphatemia  Stage III RADHA due  to ATN from septic shock without recovery for >3 months. Line is TDC from PTA. Nephrology dialyzed on Tu, Th, Sa schedule.  - Will need follow-up and outpatient dialysis  - Sevelemer powder TID with TF for hyperphosphatemia      #Anemia due to Chronic Illness  #Hematochezia, resolved  #Hemoperitoneum  Baseline hgb 17. Running between 6-8 in setting of chronic illness, new ESRD with epocrit per Nephrology, frequent lab draws and procedures. Hematochezia on 8/8 after starting standard intensity heparin, now resolved. Paracentesis on 8/21 with hemoperitoneum. No active bleeding. Required a unit of PRBC on 9/4 in the setting of several fluid boluses.   - Goal hgb > 7.0, trend Hgb  - Hgb has been stable.      #Alcohol Use Disorder  Counseled patient on complete alcohol cessation. Not interested in additional services at this time. Continue daily folic acid and thiamine.     #Lactic Acidosis  Intermittent Lactic acidosis in the setting of sepsis, ESRD, bleeding.     #Hypokalemia  Noted in the setting of high gastric output and improved once G-tube output decreased. CTM.      #Severe Malnutrition  Poor oral intake in the setting of necrotizing pancreatitis. PEG tube placed 7/13. Dietitian consulted and continuous tube feeds with goal of 65 ml/hr with continuous pancreatic enzymes  - Continuous tube feeds 50 mL overnight.        Diet: Adult Formula Drip Feeding: Continuous Vital 1.5; Jejunostomy; Goal Rate: 60; mL/hr; Medication - Feeding Tube Flush Frequency: At least 15-30 mL water before and after medication administration and with tube clogging; Amount to Send (Nutrition us...  Regular Diet Adult    DVT Prophylaxis: Pneumatic Compression Devices  Rdz Catheter: Not present  Fluids: None   Central Lines: PRESENT  PICC Triple Lumen 06/28/21 Left-Site Assessment: WDL  CVC Double Lumen Right-Site Assessment: WDL  Code Status: Full Code      Disposition Plan   Expected discharge: 09/20/2021   recommended to transitional  care unit once safe disposition plan/ TCU bed available.     The patient's care was discussed with the Attending Physician, Dr. Coon, Patient and Patient's Family.    Tri Lozada MD  [place] [address] Service  [facility]  Securely message with the Vocera Web Console (learn more here)  Text page via [facility] Paging/Directory  Please see sign in/sign out for up to date coverage information    Clinically Significant Risk Factors Present on Admission                ______________________________________________________________________    Interval History   Dax reports that he is feeling fine this morning. He reports that his prior G tube site was draining yesterday, but is no longer. He reports that he is willing to try PT to work on sitting upright. Headed to dialysis today.     Data reviewed today: I reviewed all medications, new labs and imaging results over the last 24 hours. I personally reviewed no images or EKG's today.    Physical Exam   Vital Signs: Temp: (!) 96  F (35.6  C) Temp src: Oral BP: 106/47 Pulse: 105   Resp: 18 SpO2: 95 % O2 Device: Nasal cannula Oxygen Delivery: 4 LPM  Weight: 157 lbs 10.06 oz  Physical Exam  Vitals and nursing note reviewed.   HENT:      Head: Normocephalic.      Mouth/Throat:      Mouth: Mucous membranes are moist.      Comments: Poor dentition  Eyes:      Conjunctiva/sclera: Conjunctivae normal.      Pupils: Pupils are equal, round, and reactive to light.   Cardiovascular:      Rate and Rhythm: Normal rate and regular rhythm.      Heart sounds: Normal heart sounds.   Pulmonary:      Effort: Pulmonary effort is normal.      Breath sounds: Normal breath sounds. No wheezing or rhonchi.   Abdominal:      General: Bowel sounds are normal.      Palpations: Abdomen is soft.      Tenderness: There is abdominal tenderness. There is no guarding.      Comments: Minimal serosangiunous drainage on dressing    Musculoskeletal:         General: Deformity  present.      Right lower leg: No edema.      Left lower leg: No edema.   Skin:     General: Skin is warm and dry.   Neurological:      Mental Status: He is alert. Mental status is at baseline.           Data   Recent Labs   Lab 09/15/21  0541 09/14/21  0544 09/13/21  1545 09/13/21  0631 09/11/21  2151 09/11/21  0609 09/10/21  0741 09/09/21  0800   WBC  --   --   --  11.5*  --   --  11.3* 12.8*   HGB  --   --   --  8.2*  --   --  8.2* 8.9*   MCV  --   --   --  96  --   --  95 100   PLT  --   --   --  266  --   --  230 249   * 127*  --  125*  --    < >  --  127*   POTASSIUM 5.5* 5.2  --  5.9*  --    < >  --  4.1   CHLORIDE 92* 94  --  94  --    < >  --  92*   CO2 21 20  --  22  --    < >  --  22   BUN 52* 35*  --  51*  --    < >  --  27   CR 4.00* 3.48*  --  4.59*  --    < >  --  2.47*   ANIONGAP 13 13  --  9  --    < >  --  13   JANENE 9.3 9.2  --  9.9  --    < >  --  9.1   * 206* 81 84   < >   < >  --  145*    < > = values in this interval not displayed.     No results found for this or any previous visit (from the past 24 hour(s)).  Medications     - MEDICATION INSTRUCTIONS -       dextrose       heparin (porcine) 1,000 Units/hr (08/24/21 0907)       sodium bicarbonate  325 mg Per Feeding Tube Q4H    And     amylase-lipase-protease  1-2 capsule Per Feeding Tube Q4H     amylase-lipase-protease  2 capsule Oral TID w/meals     B and C vitamin Complex with folic acid  5 mL Per Feeding Tube Daily     fiber modular (NUTRISOURCE FIBER)  1 packet Per Feeding Tube TID     folic acid  1 mg Oral Daily     heparin lock flush  5-20 mL Intracatheter Q24H     melatonin  6 mg Oral At Bedtime     menthol   Transdermal Q8H     oxyCODONE  5 mg Oral Q8H     pantoprazole (PROTONIX) IV  40 mg Intravenous Daily with breakfast     protein modular  1 packet Per Feeding Tube Daily     sevelamer carbonate (RENVELA)  0.8 g Oral TID     sodium chloride (PF)  3 mL Intracatheter Q8H     sodium chloride (PF)  3 mL Intracatheter Q8H      sodium chloride (PF)  3 mL Intracatheter Q8H     vitamin B1  100 mg Oral Daily    Or     thiamine  100 mg Intravenous Daily

## 2021-09-15 NOTE — PROGRESS NOTES
HEMODIALYSIS TREATMENT NOTE    Date: 9/15/2021  Time: 10:03 AM    Data:  Pre Wt: 71.5 kg (121 lb 14.6 oz)   Desired Wt: 71.5 kg   Post Wt: 55.3 kg (121 lb 14.6 oz)  Weight change: 0 kg  Ultrafiltration - Post Run Net Total Removed (mL): 0 mL  Vascular Access Status: patent  Dialyzer Rinse: Clear  Total Blood Volume Processed: 69.1 L Liters  Total Dialysis (Treatment) Time: 3 Hours    Lab:   no    Interventions:Assessments  Pt dialyzed today for 3hrs on a K-4 Ca-3 bath via RCVC. 400Qb throughout. No UF per renal orders. See MAR for meds given on run. VSS throughout. Pt rested during tx. See Epic for VS details. Report to PCN post run.     Plan:    Per renal

## 2021-09-16 ENCOUNTER — APPOINTMENT (OUTPATIENT)
Dept: OCCUPATIONAL THERAPY | Facility: CLINIC | Age: 31
End: 2021-09-16
Attending: INTERNAL MEDICINE
Payer: MEDICAID

## 2021-09-16 ENCOUNTER — APPOINTMENT (OUTPATIENT)
Dept: GENERAL RADIOLOGY | Facility: CLINIC | Age: 31
End: 2021-09-16
Attending: INTERNAL MEDICINE
Payer: MEDICAID

## 2021-09-16 LAB
ANION GAP SERPL CALCULATED.3IONS-SCNC: 7 MMOL/L (ref 3–14)
BUN SERPL-MCNC: 30 MG/DL (ref 7–30)
CALCIUM SERPL-MCNC: 8.7 MG/DL (ref 8.5–10.1)
CHLORIDE BLD-SCNC: 97 MMOL/L (ref 94–109)
CO2 SERPL-SCNC: 27 MMOL/L (ref 20–32)
CREAT SERPL-MCNC: 2.68 MG/DL (ref 0.66–1.25)
CRP SERPL-MCNC: 23 MG/L (ref 0–8)
ERYTHROCYTE [DISTWIDTH] IN BLOOD BY AUTOMATED COUNT: 15.9 % (ref 10–15)
GFR SERPL CREATININE-BSD FRML MDRD: 30 ML/MIN/1.73M2
GLUCOSE BLD-MCNC: 100 MG/DL (ref 70–99)
HCT VFR BLD AUTO: 27.5 % (ref 40–53)
HGB BLD-MCNC: 8.6 G/DL (ref 13.3–17.7)
MAGNESIUM SERPL-MCNC: 2 MG/DL (ref 1.6–2.3)
MCH RBC QN AUTO: 30.6 PG (ref 26.5–33)
MCHC RBC AUTO-ENTMCNC: 31.3 G/DL (ref 31.5–36.5)
MCV RBC AUTO: 98 FL (ref 78–100)
PHOSPHATE SERPL-MCNC: 5.5 MG/DL (ref 2.5–4.5)
PLATELET # BLD AUTO: 290 10E3/UL (ref 150–450)
POTASSIUM BLD-SCNC: 4.4 MMOL/L (ref 3.4–5.3)
RBC # BLD AUTO: 2.81 10E6/UL (ref 4.4–5.9)
SODIUM SERPL-SCNC: 131 MMOL/L (ref 133–144)
WBC # BLD AUTO: 10.7 10E3/UL (ref 4–11)

## 2021-09-16 PROCEDURE — 84100 ASSAY OF PHOSPHORUS: CPT

## 2021-09-16 PROCEDURE — 250N000011 HC RX IP 250 OP 636: Performed by: INTERNAL MEDICINE

## 2021-09-16 PROCEDURE — 250N000013 HC RX MED GY IP 250 OP 250 PS 637: Performed by: INTERNAL MEDICINE

## 2021-09-16 PROCEDURE — 97530 THERAPEUTIC ACTIVITIES: CPT | Mod: GO

## 2021-09-16 PROCEDURE — 86140 C-REACTIVE PROTEIN: CPT | Performed by: INTERNAL MEDICINE

## 2021-09-16 PROCEDURE — 71045 X-RAY EXAM CHEST 1 VIEW: CPT

## 2021-09-16 PROCEDURE — 99233 SBSQ HOSP IP/OBS HIGH 50: CPT | Mod: GC | Performed by: HOSPITALIST

## 2021-09-16 PROCEDURE — 71045 X-RAY EXAM CHEST 1 VIEW: CPT | Mod: 26 | Performed by: STUDENT IN AN ORGANIZED HEALTH CARE EDUCATION/TRAINING PROGRAM

## 2021-09-16 PROCEDURE — 99233 SBSQ HOSP IP/OBS HIGH 50: CPT | Performed by: INTERNAL MEDICINE

## 2021-09-16 PROCEDURE — 83735 ASSAY OF MAGNESIUM: CPT | Performed by: INTERNAL MEDICINE

## 2021-09-16 PROCEDURE — 120N000002 HC R&B MED SURG/OB UMMC

## 2021-09-16 PROCEDURE — 82310 ASSAY OF CALCIUM: CPT | Performed by: CLINICAL NURSE SPECIALIST

## 2021-09-16 PROCEDURE — 250N000011 HC RX IP 250 OP 636: Performed by: STUDENT IN AN ORGANIZED HEALTH CARE EDUCATION/TRAINING PROGRAM

## 2021-09-16 PROCEDURE — 85027 COMPLETE CBC AUTOMATED: CPT | Performed by: INTERNAL MEDICINE

## 2021-09-16 PROCEDURE — 36592 COLLECT BLOOD FROM PICC: CPT | Performed by: INTERNAL MEDICINE

## 2021-09-16 RX ORDER — HYDROMORPHONE HYDROCHLORIDE 1 MG/ML
0.5 SOLUTION ORAL
Status: COMPLETED | OUTPATIENT
Start: 2021-09-16 | End: 2021-09-17

## 2021-09-16 RX ORDER — HYDROMORPHONE HYDROCHLORIDE 1 MG/ML
0.5 INJECTION, SOLUTION INTRAMUSCULAR; INTRAVENOUS; SUBCUTANEOUS ONCE
Status: COMPLETED | OUTPATIENT
Start: 2021-09-16 | End: 2021-09-16

## 2021-09-16 RX ADMIN — SODIUM BICARBONATE 325 MG: 325 TABLET ORAL at 05:07

## 2021-09-16 RX ADMIN — HYDROMORPHONE HYDROCHLORIDE 1 MG: 1 SOLUTION ORAL at 01:43

## 2021-09-16 RX ADMIN — OXYCODONE HYDROCHLORIDE 5 MG: 5 SOLUTION ORAL at 12:10

## 2021-09-16 RX ADMIN — ACETAMINOPHEN 325 MG: 325 SOLUTION ORAL at 18:25

## 2021-09-16 RX ADMIN — THIAMINE HYDROCHLORIDE 100 MG: 100 INJECTION, SOLUTION INTRAMUSCULAR; INTRAVENOUS at 11:13

## 2021-09-16 RX ADMIN — MELATONIN TAB 3 MG 6 MG: 3 TAB at 01:42

## 2021-09-16 RX ADMIN — HYDROMORPHONE HYDROCHLORIDE 1 MG: 1 SOLUTION ORAL at 08:07

## 2021-09-16 RX ADMIN — OXYCODONE HYDROCHLORIDE 5 MG: 5 SOLUTION ORAL at 20:33

## 2021-09-16 RX ADMIN — HYDROXYZINE HYDROCHLORIDE 50 MG: 25 TABLET, FILM COATED ORAL at 04:47

## 2021-09-16 RX ADMIN — SODIUM BICARBONATE 325 MG: 325 TABLET ORAL at 01:43

## 2021-09-16 RX ADMIN — SODIUM BICARBONATE 325 MG: 325 TABLET ORAL at 20:45

## 2021-09-16 RX ADMIN — OXYCODONE HYDROCHLORIDE 5 MG: 5 SOLUTION ORAL at 04:46

## 2021-09-16 RX ADMIN — PANCRELIPASE 2 CAPSULE: 24000; 76000; 120000 CAPSULE, DELAYED RELEASE PELLETS ORAL at 01:43

## 2021-09-16 RX ADMIN — PANCRELIPASE 2 CAPSULE: 24000; 76000; 120000 CAPSULE, DELAYED RELEASE PELLETS ORAL at 05:07

## 2021-09-16 RX ADMIN — HYDROMORPHONE HYDROCHLORIDE 1 MG: 1 SOLUTION ORAL at 19:48

## 2021-09-16 RX ADMIN — ACETAMINOPHEN 325 MG: 325 SOLUTION ORAL at 11:13

## 2021-09-16 RX ADMIN — ACETAMINOPHEN 325 MG: 325 SOLUTION ORAL at 00:32

## 2021-09-16 RX ADMIN — PANCRELIPASE 1 CAPSULE: 24000; 76000; 120000 CAPSULE, DELAYED RELEASE PELLETS ORAL at 20:45

## 2021-09-16 RX ADMIN — CYCLOBENZAPRINE 10 MG: 10 TABLET, FILM COATED ORAL at 11:13

## 2021-09-16 RX ADMIN — HYDROMORPHONE HYDROCHLORIDE 0.5 MG: 1 INJECTION, SOLUTION INTRAMUSCULAR; INTRAVENOUS; SUBCUTANEOUS at 16:18

## 2021-09-16 RX ADMIN — HYDROMORPHONE HYDROCHLORIDE 1 MG: 1 SOLUTION ORAL at 14:48

## 2021-09-16 ASSESSMENT — ACTIVITIES OF DAILY LIVING (ADL)
ADLS_ACUITY_SCORE: 21
ADLS_ACUITY_SCORE: 21
ADLS_ACUITY_SCORE: 22
ADLS_ACUITY_SCORE: 21
ADLS_ACUITY_SCORE: 20
ADLS_ACUITY_SCORE: 20

## 2021-09-16 ASSESSMENT — MIFFLIN-ST. JEOR: SCORE: 1550.75

## 2021-09-16 NOTE — PROGRESS NOTES
Writer updated Provider that Pt has excoriated skin to abdomen can we get wound consult also pt having a lot of pain today can we get an extra dose of pain medications.

## 2021-09-16 NOTE — PROGRESS NOTES
This nurse informed during nurse handoff the pt has worsened rash on abdomen. Seen by provider and WOC order put in. This nurse called WOC and explained wound to nurse. WO informed this nurse that she will be unable to see pt today due to late order but gave nurse instructions on how to care for wound. Wound care done to best of nurse ability at this time with 0.5mg dilaudid given. Critic aid paste put around wound barrier. Pt stated that this started to sting and nurse stopped applying. Wound in middle of abdomen covered with abd pad and stoma power applied around G/J tube and covered with spit 4x4. This nurse called back WOC to see if any other wound care needed to be done with wound in middle of abdomen but did not receive call back at this time due to after hour call. Will continue to monitor wound.

## 2021-09-16 NOTE — PLAN OF CARE
"Pt is A/Ox4. Pt not compliant with medications, tube feed, and would not allow Xray to be done in am. Writer did approach to asks if it was ok for pt to have Xray this afternoon and pt stated \"that is ok.\" Writer did call Xray to update they can come and do Xray at anytime. Pt did not eat any food this shift. Pt also refused to allow writer to change dressing to abdomen. Writer did allow TF to restart at 20 mLs writer will increase to 30 mLs and update on coming staff. Continue to monitor per plan of care.   "

## 2021-09-16 NOTE — PROGRESS NOTES
[facility]    Progress Note - [place] 3 Service        Date of Admission:  6/28/2021    Assessment & Plan           Dax Villareal is a 30 year old male with hx of alcohol use disorder admitted on 6/28/2021 after recent prolonged admission at [facility] in [place] who has severe acute alcoholic hepatitis c/b HE, ESRD requiring HD, and malnutrition in the setting of acute necrotizing pancreatitis, s/p PEG-J placement on 7/13. He arrived with acute respiratory failure and septic shock that have resolved, and his secondary bacterial peritonitis has been appropriately treated. He requires continued management for ESRD, hepatic encephalopathy, infected pancreatic pseudocyst, nutritional support via PEG-J, and persistent right pleural effusion. Clinically improving after multiple necrosectomies. Increasing leukocytosis and hypotension on 9/2 prompting resumption of IV antibiotics - has been stable since that time. Repeat CT with interval improvement in fluid collections.     Today:   - Increasing irritation surrounding GJ site; does not appear cellulitic, but if he fevers, would cover for intraabdominal and skin sources and evaluate with CT   - Appreciate WOC for G-tube wound      #Septic Shock, resolved  #Necrotizing alcoholic pancreatitis w/ infected pseudocyst, improving   #Hepatic Fluid collection, stable to improving  #Secondary Bacterial Peritonitis 2/2 infected pseudocyst with VRE, improving  Admitted to the [facility] ICU on 6/28/2021 from [facility] in [place] for septic shock due to necrotizing pancreatitis. CT on admit showing mature collection with enhancing wall measuring ~[id number]. IR placed perc drain 6/29. Repeat imaging 7/18 revealed walled off necrotic collection amenable to endoscopic transluminal drainage with cystgastrostomy stenting with necrosectomy on 7/21/21. Complete necrosectomy on 8/11. Antibiotics (cefepime and metronidazole) were discontinued  on 8/25 after left flank drain removed, re-initiated in the setting of fever on 9/2. Favor abdominal etiology, repeat CT on 9/7 with interval improvement in fluid collections - will complete 7d course of abx per ID with EOT 9/8.   - Will likely need intermittent antibiotics with fluid collections occasionally with bacterial growth moving forward as to be expected in nec panc      # Hypovolemia  Patient with substantial daily NG output and poor PO. Also note significant weight loss over the past few weeks. He has needed intermittent boluses of normal saline for euvolemia. Have discussed with renal multiple times - please re-evaluate volume status vs dry weight as he is often hypotensive following fluid removal.   - Weight 78kg on 8/13. Now 55 kg. Concern for weight loss vs. targeting dry weight.  -Monitor vitals and output post dialysis, consider fluids prn.     #Gastric Outlet Obstruction  High G tube output due to the cystogastric connection/fluid drainage noted on 8/5. Per GI, possibly related to gastric outlet from severe abdominal inflammation and is expected. KUB negative for obstructive gas pattern. Continues to have mild to moderate NG output - stable to improved. Relieved with draining G tube to gravity when experiencing pain/nausea.      #Hyponatremia, stable  Patient with down-trending sodium the week of 8/27; likely due to substantial NG output and low solute intake and hepatic dysfunction. Stable 127-128.     #Right Pancreatic Hydrothorax, stable  Increased dyspnea with CXR on 7/19 with further imaging and thoracentesis confirming a hepatic/pancreatic hydrothorax. Thoracentesis PRN for dyspnea, most recently 9/3.      #Alcoholic Hepatitis  #Coagulation Defect  Received 1 week of steroids at [facility] for elevated INR and altered mental status. Not a liver transplant candidate. Mental status improved with aggressive lactulose and rifaxamin, which was discontinued without return of AMS.     ESRD due to  ATN  Hyperphosphatemia  Stage III RADHA due to ATN from septic shock without recovery for >3 months. Line is TDC from PTA. Nephrology dialyzed on Tu, Th, Sa schedule.  - Will need follow-up and outpatient dialysis  - Sevelemer powder TID with TF for hyperphosphatemia      #Anemia due to Chronic Illness  #Hematochezia, resolved  #Hemoperitoneum  Baseline hgb 17. Running between 6-8 in setting of chronic illness, new ESRD with epocrit per Nephrology, frequent lab draws and procedures. Hematochezia on 8/8 after starting standard intensity heparin, now resolved. Paracentesis on 8/21 with hemoperitoneum. No active bleeding. Required a unit of PRBC on 9/4 in the setting of several fluid boluses.   - Goal hgb > 7.0, trend Hgb  - Hgb has been stable.      #Alcohol Use Disorder  Counseled patient on complete alcohol cessation. Not interested in additional services at this time. Continue daily folic acid and thiamine.     #Lactic Acidosis  Intermittent Lactic acidosis in the setting of sepsis, ESRD, bleeding.     #Hypokalemia  Noted in the setting of high gastric output and improved once G-tube output decreased. CTM.      #Severe Malnutrition  Poor oral intake in the setting of necrotizing pancreatitis. PEG tube placed 7/13, replaced 9/14. Dietitian consulted and continuous tube feeds with goal of 65 ml/hr with continuous pancreatic enzymes. Transitioning to renal formula.   - Continuous tube feeds 50 mL overnight.        Diet: Adult Formula Drip Feeding: Continuous Vital 1.5; Jejunostomy; Goal Rate: 60; mL/hr; Medication - Feeding Tube Flush Frequency: At least 15-30 mL water before and after medication administration and with tube clogging; Amount to Send (Nutrition us...  Regular Diet Adult    DVT Prophylaxis: Pneumatic Compression Devices  Rdz Catheter: Not present  Fluids: None   Central Lines: PRESENT  PICC Triple Lumen 06/28/21 Left-Site Assessment: WDL  CVC Double Lumen Right-Site Assessment: WDL  Code Status: Full  Code      Disposition Plan   Expected discharge: 09/20/2021   recommended to transitional care unit once safe disposition plan/ TCU bed available.     The patient's care was discussed with the Attending Physician, Dr. Coon, Patient and Patient's Family.    Anali Jackson MD   Med/Peds, PGY-2  [address]  Securely message with the Vocera Web Console (learn more here)  Text page via [employer] Paging/Directory  Please see sign in/sign out for up to date coverage information    ______________________________________________________________________    Interval History   Dax reports worsening abdominal pain this morning and has increasing abdominal erythema where the wet gauze is sitting on his skin. No purulence from the GJ site. Increased O2 requirements. Poor appetite today.     Data reviewed today: I reviewed all medications, new labs and imaging results over the last 24 hours. I personally reviewed no images or EKG's today.    Physical Exam   Vital Signs: Temp: (!) 96  F (35.6  C) Temp src: Oral BP: 99/48 Pulse: (!) 122   Resp: 16 SpO2: 97 % O2 Device: Nasal cannula with humidification Oxygen Delivery: 6 LPM  Weight: 143 lbs 15.37 oz   General: uncomfortable, no acute distress, ill  HEENT: poor dentition, moist oral mucosa, icteric sclera  CV: tachycardic, no murmur appreciated, well perfused  Resp: no breath sounds present over right lung, CTAB on left, 6L NC in place  Abd: soft, tender to palpation, mild distention, no guarding  Skin: erythema and irritation surrounding GJ site that appears to be from soaked gauze overlying this, not overtly cellulitic     Data   Recent Labs   Lab 09/16/21  0533 09/15/21  0541 09/14/21  0544 09/13/21  1545 09/13/21  0631 09/11/21  0609 09/10/21  0741   WBC 10.7  --   --   --  11.5*  --  11.3*   HGB 8.6*  --   --   --  8.2*  --  8.2*   MCV 98  --   --   --  96  --  95     --   --   --  266  --  230   *  126* 127*  --  125*   < >  --    POTASSIUM 4.4 5.5* 5.2  --  5.9*   < >  --    CHLORIDE 97 92* 94  --  94   < >  --    CO2 27 21 20  --  22   < >  --    BUN 30 52* 35*  --  51*   < >  --    CR 2.68* 4.00* 3.48*  --  4.59*   < >  --    ANIONGAP 7 13 13  --  9   < >  --    JANENE 8.7 9.3 9.2  --  9.9   < >  --    * 142* 206*   < > 84   < >  --     < > = values in this interval not displayed.     Recent Results (from the past 24 hour(s))   XR Chest Port 1 View    Narrative    EXAM: XR CHEST PORT 1 VIEW  9/16/2021 1:27 PM      HISTORY: worsening SOB, h/o of pleural effusion    COMPARISON: Chest x-ray 9/3/2021    FINDINGS: Portable semiupright AP radiograph of the chest. Right IJ  central venous catheter tip projects over the mid SVC. Left upper  extremity PICC tip projects over the low SVC. The trachea is midline.  The cardiac silhouette is partially obscured. Large right pleural  effusion with near complete opacification of the right hemithorax. No  left pleural effusion. No definite pneumothorax. Surgical tubes  project over the left upper quadrant. Linear radiopacity along the  subcutaneous lower left lateral chest wall possibly external to the  patient.      Impression    IMPRESSION: Near complete opacification of the right hemithorax,  likely representing large right pleural effusion associated with near  total compressive atelectasis of the right lung.    I have personally reviewed the examination and initial interpretation  and I agree with the findings.    JOHN OCONNOR MD         SYSTEM ID:  [id number]     Medications     - MEDICATION INSTRUCTIONS -       dextrose       heparin (porcine) 1,000 Units/hr (08/24/21 0907)       sodium bicarbonate  325 mg Per Feeding Tube Q4H    And     amylase-lipase-protease  1-2 capsule Per Feeding Tube Q4H     amylase-lipase-protease  2 capsule Oral TID w/meals     B and C vitamin Complex with folic acid  5 mL Per Feeding Tube Daily     fiber modular (NUTRISOURCE FIBER)  1  packet Per Feeding Tube TID     folic acid  1 mg Oral Daily     heparin lock flush  5-20 mL Intracatheter Q24H     melatonin  6 mg Oral At Bedtime     menthol   Transdermal Q8H     oxyCODONE  5 mg Oral Q8H     pantoprazole (PROTONIX) IV  40 mg Intravenous Daily with breakfast     protein modular  1 packet Per Feeding Tube Daily     sevelamer carbonate (RENVELA)  0.8 g Oral TID     sodium chloride (PF)  3 mL Intracatheter Q8H     vitamin B1  100 mg Oral Daily    Or     thiamine  100 mg Intravenous Daily

## 2021-09-16 NOTE — PROGRESS NOTES
Attempted to due dressing change at end of shift and pt made writer stop skin is very excoriated to abdomen. Will update on coming staff and send note to Provider.

## 2021-09-16 NOTE — PROGRESS NOTES
Nephrology Progress Note  09/16/2021           Dax Villareal is a 30 year old male with h/o ETOH hepatitis, end stage liver disease, RADHA on HD, transferred from [facility] in [place] for septic shock on 6/28/21 for treatment of necrotizing pancreatitis and decompensated liver disease. He was initially admitted to [facility] 5/19/21 and started on RRT 5/26/2021.        Interval History :   Mr Villareal had necrosectomy for 4th time on 8/11 with no more anticipated.  Had HD yesterday, did not pull fluid as his drain output has been on high side and wt's were downtrending although this is up again which suggests these are unreliable.  Considered extra HD today but BP's are on low side and he is having significant abdominal pain and is unwilling to consider extra run.  Plan for HD tomorrow.        Assessment & Recommendations:   ESRD-Cr was 0.8 as recently as 5/19/2021 but now HD dependent for 3 months, started RRT at [facility] prior to transfer to [facility] on 5/26.  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  continuing restorative cares for now, BP's have been stable enough to support HD.  Had GI stenting of pancreas x4.                 -Line is TDC [facility] [place]               -Had HD yesterday, offered run today but he is having increased abdominal pain and is not willing to do HD.     -CXR to evaluate increased O2 needs.       Volume status-Anuric, wt at low for his course, not pulling fluid as his drain output is significant, wt's are unreliable.  O2 needs are up, ordered CXR to see if this is related to pulm edema.       Electrolytes/pH-Na chronically low but improved at 131 today.  K 4.4 and bicarb 27.       Ca/phos/pth-Ca 8.7, Mg 2.0, Phos has been up but relatively controlled at 5.5 today likely due to being NPO recently for G-tube exchange.                    Anemia-Hgb low at 8.6, acute management per team, last PRBC's 7/14.  Started on EPO 5k  "with runs, iron sats 43%      Nutrition-Taking PO, appetite poor lately, has been NPO due to G tube placement yesterday, would change to renal formula to limit K and Phos.       Seen and discussed with Dr Arellano      Recommendations were communicated to primary team via note     SOFIA Marlow CNS  Clinical Nurse Specialist  [phone]      Review of Systems:   I reviewed the following systems:  Gen: No fevers or chills  CV: No CP at rest  Resp: No SOB at rest  GI: ++Abd pain,     Physical Exam:   I/O last 3 completed shifts:  In: 1460 [NG/GT:560]  Out: 375 [Emesis/NG output:375]   BP 92/51 (BP Location: Left arm)   Pulse 118   Temp (!) 95.8  F (35.4  C) (Oral)   Resp 16   Ht 1.676 m (5' 6\")   Wt 71.5 kg (157 lb 10.1 oz)   SpO2 95%   BMI 25.44 kg/m       GENERAL APPEARANCE: Lying in bed, in no distress.    EYES:  scleral icterus, pupils equal  Pulmonary: decreased BS   CV: tachy   - Edema - no LE edema  GI: mild distention, non-tender. + tenderness at G-tube site and generalized abdominal pain.    MS: no evidence of inflammation in joints, no muscle tenderness  SKIN: no rash, warm, dry, no cyanosis, + G tube erythremia   NEURO: alert/interactive  ACCESS: Right TDC with no induration or erythema    Labs:   All labs reviewed by me  Electrolytes/Renal - Recent Labs   Lab Test 09/16/21  0533 09/15/21  0541 09/14/21  0544 09/13/21  1545 09/13/21  0631   * 126* 127*  --  125*   POTASSIUM 4.4 5.5* 5.2  --  5.9*   CHLORIDE 97 92* 94  --  94   CO2 27 21 20  --  22   BUN 30 52* 35*  --  51*   CR 2.68* 4.00* 3.48*  --  4.59*   * 142* 206*   < > 84   JANENE 8.7 9.3 9.2  --  9.9   MAG 2.0 2.3 2.2  --  2.2   PHOS 5.5* 8.6*  --   --  9.0*    < > = values in this interval not displayed.       CBC -   Recent Labs   Lab Test 09/16/21  0533 09/13/21  0631 09/10/21  0741   WBC 10.7 11.5* 11.3*   HGB 8.6* 8.2* 8.2*    266 230       LFTs -   Recent Labs   Lab Test 09/04/21  0414 09/03/21  0523 " 09/02/21  1730   ALKPHOS 81 95 120   BILITOTAL 1.4* 2.0* 2.2*   ALT 29 31 40   AST 38 39 57*   PROTTOTAL 8.8 9.2* 10.7*   ALBUMIN 2.9* 2.3* 2.7*       Iron Panel -   Recent Labs   Lab Test 08/14/21 2055 07/19/21  0632   IRON 28* 31*   IRONSAT 21 43   JERRELL 2,186*  --            Current Medications:    sodium bicarbonate  325 mg Per Feeding Tube Q4H    And     amylase-lipase-protease  1-2 capsule Per Feeding Tube Q4H     amylase-lipase-protease  2 capsule Oral TID w/meals     B and C vitamin Complex with folic acid  5 mL Per Feeding Tube Daily     fiber modular (NUTRISOURCE FIBER)  1 packet Per Feeding Tube TID     folic acid  1 mg Oral Daily     heparin lock flush  5-20 mL Intracatheter Q24H     melatonin  6 mg Oral At Bedtime     menthol   Transdermal Q8H     oxyCODONE  5 mg Oral Q8H     pantoprazole (PROTONIX) IV  40 mg Intravenous Daily with breakfast     protein modular  1 packet Per Feeding Tube Daily     sevelamer carbonate (RENVELA)  0.8 g Oral TID     sodium chloride (PF)  3 mL Intracatheter Q8H     vitamin B1  100 mg Oral Daily    Or     thiamine  100 mg Intravenous Daily       - MEDICATION INSTRUCTIONS -       dextrose       heparin (porcine) 1,000 Units/hr (08/24/21 0907)

## 2021-09-16 NOTE — PROGRESS NOTES
"[id number]    Reason for admission: Pancreatitis [K85.90]    /58 (BP Location: Left arm)   Pulse 106   Temp 97.1  F (36.2  C) (Oral)   Resp 18   Ht 1.676 m (5' 6\")   Wt 71.5 kg (157 lb 10.1 oz)   SpO2 96%   BMI 25.44 kg/m        Tachycardic otherwise VSS. Team lift assist. Not OOB during shift, refuses repositioning. Crackles in R lung. Abdominal distention. G/J tube. Dressing changed x1. Copious amounts of drainage coming from old G/J site. G to gravity. J for continuous TF and meds. No UOP, pt. on HD. Refused skin assessment. Red around old G/J site. Wound site leaking. Complaints of pain at site. Dilaudid/Tylenol given x1. MD glasgow'd administration of PRN Tylenol despite chart stating allergy to it. PICC SL. CVC for HD WNL. Will follow POC and continue to monitor.           "

## 2021-09-16 NOTE — PLAN OF CARE
D/I: Pt is alert and oriented x4. Tachycardic and dyspneic on 3L oxygen via NC over night. Right Chest dialysis access intact. GJ tube patent with J--port running TF @ 60mL/hr with solution + enzymes/NaBicarb Q4h. G-port to gravity. Old GJ dressing saturated but pt refuses to have it changed after repeated appeals. Scheduled oxycodone given for pain and Dilaudid oral PRN. Skin is jaundiced and fragile. Pt repositions independently in bed.   P: Will continue to monitor pt and follow POC.     Problem: Adult Inpatient Plan of Care  Goal: Absence of Hospital-Acquired Illness or Injury  Intervention: Identify and Manage Fall Risk  Recent Flowsheet Documentation  Taken 9/16/2021 0000 by Juan C Granados RN  Safety Promotion/Fall Prevention:   fall prevention program maintained   increase visualization of patient   activity supervised   assistive device/personal items within reach   safety round/check completed  Intervention: Prevent Skin Injury  Recent Flowsheet Documentation  Taken 9/16/2021 0000 by Juan C Granados RN  Body Position: refuses positioning  Intervention: Prevent Infection  Recent Flowsheet Documentation  Taken 9/16/2021 0000 by Juan C Granados RN  Infection Prevention:   single patient room provided   rest/sleep promoted   hand hygiene promoted  Goal: Optimal Comfort and Wellbeing  Intervention: Provide Person-Centered Care  Recent Flowsheet Documentation  Taken 9/16/2021 0000 by Juan C Granados RN  Trust Relationship/Rapport:   care explained   choices provided   emotional support provided   reassurance provided  Goal: Readiness for Transition of Care  Outcome: No Change  Flowsheets (Taken 8/26/2021 1543 by Clarissa Hugo, [facility])  Transportation Anticipated: agency  Concerns to be Addressed: discharge planning     Problem: Adjustment to Illness (Delirium)  Goal: Optimal Coping  Intervention: Optimize Psychosocial Adjustment to Delirium  Recent Flowsheet  Documentation  Taken 9/16/2021 0000 by Juan C Granados RN  Family/Support System Care: self-care encouraged     Problem: Infection (Pancreatitis)  Goal: Infection Symptom Resolution  Intervention: Prevent or Manage Infection  Recent Flowsheet Documentation  Taken 9/16/2021 0000 by Juan C Granados RN  Isolation Precautions: contact precautions maintained     Problem: Respiratory Compromise (Pancreatitis)  Goal: Effective Oxygenation and Ventilation  Intervention: Optimize Oxygenation and Ventilation  Recent Flowsheet Documentation  Taken 9/16/2021 0000 by Juan C Granados RN  Activity Management:   activity adjusted per tolerance   activity encouraged  Head of Bed (HOB) Positioning: HOB at 20-30 degrees     Problem: Infection  Goal: Absence of Infection Signs and Symptoms  Intervention: Prevent or Manage Infection  Recent Flowsheet Documentation  Taken 9/16/2021 0000 by Juan C Granados RN  Isolation Precautions: contact precautions maintained     Problem: Violence Risk or Actual  Goal: Anger and Impulse Control  Intervention: Minimize Safety Risk  Recent Flowsheet Documentation  Taken 9/16/2021 0000 by Juan C Granados RN  Enhanced Safety Measures: bed alarm set     Problem: Adjustment to Illness (Liver Failure)  Goal: Optimal Coping with Liver Failure  Intervention: Support Response to Liver Disease  Recent Flowsheet Documentation  Taken 9/16/2021 0000 by Juan C Granados RN  Family/Support System Care: self-care encouraged     Problem: Respiratory Compromise (Liver Failure)  Goal: Effective Oxygenation and Ventilation  Intervention: Promote Airway Secretion Clearance  Recent Flowsheet Documentation  Taken 9/16/2021 0000 by Juan C Granados RN  Activity Management:   activity adjusted per tolerance   activity encouraged  Intervention: Optimize Oxygenation and Ventilation  Recent Flowsheet Documentation  Taken 9/16/2021 0000 by Juan C Granados  ROB Acevedo  Head of Bed (HOB) Positioning: HOB at 20-30 degrees     Problem: Behavioral Health Comorbidity  Goal: Maintenance of Behavioral Health Symptom Control  Intervention: Maintain Behavioral Health Symptom Control  Recent Flowsheet Documentation  Taken 9/16/2021 0000 by Juan C Granados RN  Medication Review/Management: medications reviewed     Problem: Infection  Goal: Absence of Infection Signs and Symptoms  Intervention: Prevent or Manage Infection  Recent Flowsheet Documentation  Taken 9/16/2021 0000 by Juan C Granados RN  Isolation Precautions: contact precautions maintained     Problem: Pain Acute  Goal: Acceptable Pain Control and Functional Ability  Intervention: Prevent or Manage Pain  Recent Flowsheet Documentation  Taken 9/16/2021 0000 by Juan C Granados RN  Medication Review/Management: medications reviewed     Problem: Discharge Planning  Goal: Discharge Planning (Adult, OB, Behavioral, Peds)  Outcome: No Change  Flowsheets  Taken 8/26/2021 1557 by Clarissa Hugo LICSW  Anticipated Discharge Services (CM/SW): (New outpatient dialysis) --  Taken 8/26/2021 1543 by Clarissa Hugo LICSW  Patient/Family in Agreement with Plan: yes  Transportation Anticipated: agency  Concerns to be Addressed: discharge planning  Patient/Family Anticipates Transition to: inpatient rehabilitation facility  Patient/family educated on Medicare website which has current facility and service quality ratings: (Pt requesting TCU in or near [place]) no  Anticipated Discharge Disposition (CM/SW):   Skilled Nursing Facilty   Transitional Care  Anticipated Discharge DME (CM/SW): None  Taken 7/8/2021 0940 by Suresh Arreguin, MSW  RETIRED: Expected Discharge Date: 07/15/21

## 2021-09-16 NOTE — PROGRESS NOTES
Writer updated provider that pt is requesting additional pain medications. Has been given all PRNs

## 2021-09-17 LAB
ANION GAP SERPL CALCULATED.3IONS-SCNC: 10 MMOL/L (ref 3–14)
BUN SERPL-MCNC: 43 MG/DL (ref 7–30)
CALCIUM SERPL-MCNC: 9.4 MG/DL (ref 8.5–10.1)
CHLORIDE BLD-SCNC: 93 MMOL/L (ref 94–109)
CO2 SERPL-SCNC: 25 MMOL/L (ref 20–32)
CREAT FLD-MCNC: 0.9 MG/DL
CREAT SERPL-MCNC: 3.64 MG/DL (ref 0.66–1.25)
GFR SERPL CREATININE-BSD FRML MDRD: 21 ML/MIN/1.73M2
GLUCOSE BLD-MCNC: 120 MG/DL (ref 70–99)
HOLD SPECIMEN: NORMAL
LACTATE SERPL-SCNC: 1.5 MMOL/L (ref 0.7–2)
LACTATE SERPL-SCNC: 1.7 MMOL/L (ref 0.7–2)
MAGNESIUM SERPL-MCNC: 2.2 MG/DL (ref 1.6–2.3)
POTASSIUM BLD-SCNC: 4.6 MMOL/L (ref 3.4–5.3)
SODIUM SERPL-SCNC: 128 MMOL/L (ref 133–144)

## 2021-09-17 PROCEDURE — 250N000013 HC RX MED GY IP 250 OP 250 PS 637: Performed by: INTERNAL MEDICINE

## 2021-09-17 PROCEDURE — 250N000013 HC RX MED GY IP 250 OP 250 PS 637

## 2021-09-17 PROCEDURE — G0463 HOSPITAL OUTPT CLINIC VISIT: HCPCS

## 2021-09-17 PROCEDURE — 250N000011 HC RX IP 250 OP 636: Performed by: INTERNAL MEDICINE

## 2021-09-17 PROCEDURE — C9113 INJ PANTOPRAZOLE SODIUM, VIA: HCPCS | Performed by: INTERNAL MEDICINE

## 2021-09-17 PROCEDURE — 258N000003 HC RX IP 258 OP 636: Performed by: CLINICAL NURSE SPECIALIST

## 2021-09-17 PROCEDURE — 80048 BASIC METABOLIC PNL TOTAL CA: CPT | Performed by: INTERNAL MEDICINE

## 2021-09-17 PROCEDURE — 36592 COLLECT BLOOD FROM PICC: CPT | Performed by: INTERNAL MEDICINE

## 2021-09-17 PROCEDURE — 99232 SBSQ HOSP IP/OBS MODERATE 35: CPT | Mod: GC | Performed by: HOSPITALIST

## 2021-09-17 PROCEDURE — 99233 SBSQ HOSP IP/OBS HIGH 50: CPT | Performed by: INTERNAL MEDICINE

## 2021-09-17 PROCEDURE — 83605 ASSAY OF LACTIC ACID: CPT | Performed by: INTERNAL MEDICINE

## 2021-09-17 PROCEDURE — 120N000002 HC R&B MED SURG/OB UMMC

## 2021-09-17 PROCEDURE — 83735 ASSAY OF MAGNESIUM: CPT | Performed by: INTERNAL MEDICINE

## 2021-09-17 PROCEDURE — 83605 ASSAY OF LACTIC ACID: CPT

## 2021-09-17 PROCEDURE — 82570 ASSAY OF URINE CREATININE: CPT | Performed by: INTERNAL MEDICINE

## 2021-09-17 PROCEDURE — 90937 HEMODIALYSIS REPEATED EVAL: CPT

## 2021-09-17 PROCEDURE — 258N000003 HC RX IP 258 OP 636

## 2021-09-17 PROCEDURE — 634N000001 HC RX 634: Performed by: CLINICAL NURSE SPECIALIST

## 2021-09-17 PROCEDURE — 36592 COLLECT BLOOD FROM PICC: CPT

## 2021-09-17 RX ORDER — HYDROMORPHONE HYDROCHLORIDE 1 MG/ML
0.5 INJECTION, SOLUTION INTRAMUSCULAR; INTRAVENOUS; SUBCUTANEOUS EVERY 6 HOURS PRN
Status: DISCONTINUED | OUTPATIENT
Start: 2021-09-17 | End: 2021-09-28

## 2021-09-17 RX ADMIN — OXYCODONE HYDROCHLORIDE 5 MG: 5 SOLUTION ORAL at 20:46

## 2021-09-17 RX ADMIN — Medication 15 ML: at 17:30

## 2021-09-17 RX ADMIN — HYDROMORPHONE HYDROCHLORIDE 1 MG: 1 SOLUTION ORAL at 15:55

## 2021-09-17 RX ADMIN — HYDROMORPHONE HYDROCHLORIDE 1 MG: 1 SOLUTION ORAL at 10:23

## 2021-09-17 RX ADMIN — ACETAMINOPHEN 325 MG: 325 SOLUTION ORAL at 18:03

## 2021-09-17 RX ADMIN — HYDROXYZINE HYDROCHLORIDE 50 MG: 25 TABLET, FILM COATED ORAL at 04:24

## 2021-09-17 RX ADMIN — SODIUM BICARBONATE 325 MG: 325 TABLET ORAL at 05:43

## 2021-09-17 RX ADMIN — PANCRELIPASE 2 CAPSULE: 24000; 76000; 120000 CAPSULE, DELAYED RELEASE PELLETS ORAL at 20:47

## 2021-09-17 RX ADMIN — HYDROXYZINE HYDROCHLORIDE 50 MG: 25 TABLET, FILM COATED ORAL at 08:14

## 2021-09-17 RX ADMIN — ACETAMINOPHEN 325 MG: 325 SOLUTION ORAL at 08:35

## 2021-09-17 RX ADMIN — PANCRELIPASE 2 CAPSULE: 24000; 76000; 120000 CAPSULE, DELAYED RELEASE PELLETS ORAL at 10:22

## 2021-09-17 RX ADMIN — THIAMINE HCL TAB 100 MG 100 MG: 100 TAB at 08:15

## 2021-09-17 RX ADMIN — Medication 1 PACKET: at 20:50

## 2021-09-17 RX ADMIN — HYDROXYZINE HYDROCHLORIDE 50 MG: 25 TABLET, FILM COATED ORAL at 21:21

## 2021-09-17 RX ADMIN — OXYCODONE HYDROCHLORIDE 5 MG: 5 SOLUTION ORAL at 11:59

## 2021-09-17 RX ADMIN — FOLIC ACID 1 MG: 1 TABLET ORAL at 10:23

## 2021-09-17 RX ADMIN — SODIUM BICARBONATE 325 MG: 325 TABLET ORAL at 15:55

## 2021-09-17 RX ADMIN — PANCRELIPASE 2 CAPSULE: 24000; 76000; 120000 CAPSULE, DELAYED RELEASE PELLETS ORAL at 05:43

## 2021-09-17 RX ADMIN — MELATONIN TAB 3 MG 6 MG: 3 TAB at 00:45

## 2021-09-17 RX ADMIN — SODIUM BICARBONATE 325 MG: 325 TABLET ORAL at 20:47

## 2021-09-17 RX ADMIN — PANTOPRAZOLE SODIUM 40 MG: 40 INJECTION, POWDER, FOR SOLUTION INTRAVENOUS at 08:15

## 2021-09-17 RX ADMIN — SODIUM BICARBONATE 325 MG: 325 TABLET ORAL at 10:23

## 2021-09-17 RX ADMIN — SODIUM CHLORIDE 300 ML: 9 INJECTION, SOLUTION INTRAVENOUS at 13:05

## 2021-09-17 RX ADMIN — HYDROMORPHONE HYDROCHLORIDE 1 MG: 1 SOLUTION ORAL at 00:44

## 2021-09-17 RX ADMIN — HYDROMORPHONE HYDROCHLORIDE 1 MG: 1 SOLUTION ORAL at 05:43

## 2021-09-17 RX ADMIN — SODIUM CHLORIDE 250 ML: 9 INJECTION, SOLUTION INTRAVENOUS at 13:05

## 2021-09-17 RX ADMIN — SODIUM CHLORIDE 500 ML: 9 INJECTION, SOLUTION INTRAVENOUS at 23:13

## 2021-09-17 RX ADMIN — Medication 1 PACKET: at 08:16

## 2021-09-17 RX ADMIN — OXYCODONE HYDROCHLORIDE 5 MG: 5 SOLUTION ORAL at 04:24

## 2021-09-17 RX ADMIN — SODIUM BICARBONATE 325 MG: 325 TABLET ORAL at 23:13

## 2021-09-17 RX ADMIN — PANCRELIPASE 2 CAPSULE: 24000; 76000; 120000 CAPSULE, DELAYED RELEASE PELLETS ORAL at 23:19

## 2021-09-17 RX ADMIN — EPOETIN ALFA-EPBX 4000 UNITS: 10000 INJECTION, SOLUTION INTRAVENOUS; SUBCUTANEOUS at 15:35

## 2021-09-17 RX ADMIN — ONDANSETRON 4 MG: 2 INJECTION INTRAMUSCULAR; INTRAVENOUS at 11:59

## 2021-09-17 RX ADMIN — ACETAMINOPHEN 325 MG: 325 SOLUTION ORAL at 01:52

## 2021-09-17 RX ADMIN — PANCRELIPASE 2 CAPSULE: 24000; 76000; 120000 CAPSULE, DELAYED RELEASE PELLETS ORAL at 15:54

## 2021-09-17 RX ADMIN — CYCLOBENZAPRINE 10 MG: 10 TABLET, FILM COATED ORAL at 08:14

## 2021-09-17 RX ADMIN — Medication: at 13:05

## 2021-09-17 RX ADMIN — SODIUM BICARBONATE 325 MG: 325 TABLET ORAL at 01:10

## 2021-09-17 RX ADMIN — PANCRELIPASE 2 CAPSULE: 24000; 76000; 120000 CAPSULE, DELAYED RELEASE PELLETS ORAL at 01:10

## 2021-09-17 RX ADMIN — HYDROMORPHONE HYDROCHLORIDE 0.5 MG: 1 SOLUTION ORAL at 03:04

## 2021-09-17 RX ADMIN — SEVELAMER CARBONATE 0.8 G: 800 POWDER, FOR SUSPENSION ORAL at 20:47

## 2021-09-17 RX ADMIN — Medication 5 ML: at 08:15

## 2021-09-17 ASSESSMENT — MIFFLIN-ST. JEOR
SCORE: 1538.75
SCORE: 1548.75

## 2021-09-17 ASSESSMENT — ACTIVITIES OF DAILY LIVING (ADL)
ADLS_ACUITY_SCORE: 22
ADLS_ACUITY_SCORE: 16
ADLS_ACUITY_SCORE: 22
ADLS_ACUITY_SCORE: 16
ADLS_ACUITY_SCORE: 18

## 2021-09-17 NOTE — PROGRESS NOTES
HEMODIALYSIS TREATMENT NOTE    Date: 9/17/2021  Time: 4:56 PM    Data:  Pre Wt: 65.1 kg (143 lb 8.3 oz)   Desired Wt: 63.1 kg   Post Wt: 64.1 kg (141 lb 5 oz) (estimated)  Weight change: 1 kg  Ultrafiltration - Post Run Net Total Removed (mL): 1000 mL  Vascular Access Status: CVC  patent  Dialyzer Rinse: Moderate (clotted venous chamber)  Total Blood Volume Processed: 62.18 L   Total Dialysis (Treatment) Time: 3 hr 15 min   Dialysate Bath: K 2, Ca 2.5  Heparin: None    Lab:   No    Interventions:  Pt is 31 yo ETOH ESLD, arrived for routine HD tx, order to pull 2 kg in 3.5 hr. Pt has old G tube site that is still leaking and ostomy pouch is placed over site to collect drng.   UF goal adjusted down to meet BP parameters.   Net 1 kg off per machine but pt also lost at least 200 ml from 2 lg stools and 100 ml dumped from ostomy pouch.   Noted 10% blood vol chg per crit line. Post run /51.    Site care to CVC done and looks to be in excellent condition.     Assessment:  Stable HD with lower UF goal.      Plan:    Next tx per renal team

## 2021-09-17 NOTE — PROGRESS NOTES
Care Management Follow Up    Length of Stay (days): 81    Expected Discharge Date: pending placement     Concerns to be Addressed: placement       Additional Information:  RNCC spoke with provider about the barriers to patients discharge and prolonged stay. See SW note earlier this week. Provider acknowledged.    New call placed to [facility] d/t patients insurance now being updated in there system they will be running his benefits. He still does currently meet their criteria. He is low complexity for their facility therefore potential for admission will depend upon a non-ICU bed opening. Earliest potential admission, pending insurance run, is early to mid next week.    RNCC will continue to follow and assist as needed.    Marisol Moya, RN    Marisol Moya RN, BSN  5B RN Care Coordinator  [phone] phone  [phone] pager    Weekend or Holiday Care Coordinator [phone] pager  Job Code [id number]

## 2021-09-17 NOTE — PLAN OF CARE
Pt A&O x4, Tachy HR in 120's team paged and no interventions at this time. Pt abdomen very red and excoriated. Dressing applied the wound (see previous note). Dressing needing to be changed 5x this shift due to copious amounts of gastric looking drainage on bandage. Pt stated that drainage correlated to the amount he drank. Low appetite. Ate a couple of slices of peaches for dinner. TF running at 40mL/hr. Goal 60.  Pt tolerating current rate. TF lowered during day due to severe abdominal pain. Pt stated being worried PO dilaudid only controlling pain for 3 hrs but is available every 4 and how this would negatively affect his sleep. One time dose of dilaudid put in if pt needs it for break through pain. Pt intermittently refusing turns. Sats 89% when on RA. Will continue to monitor.

## 2021-09-17 NOTE — PROGRESS NOTES
GASTROENTEROLOGY PROGRESS NOTE    Date: 09/17/2021     ASSESSMENT:  30 year old male with a history of alcohol use disorder who presented to OSH 5/19 for abdominal pain, nausea and vomiting, and found to have alcohol hepatitis, ESLD, RADHA on HD, septic shock requiring pressors as well as necrotizing pancreatitis with large evolving necrotic collection. Transferred to [facility] for drainage of presumed infected necrotic collection. S/p EUS transluminal and percutaneous drainage. GI re-consulted management of feeding tube (PEG/PEGJ).     # Poor nutrition s/p PEG-J placement  # Severe malnutrition in the context of chronic illness  GI renotified 9/13 for bulging G-tube with pain. Found to be displaced G-tube with bumper approaching the skin causing pain. Last week has tried pushing in, but failed. Thus, underwent G-tube removal and putting in new PEGJ tube at the new site 9/13/21 without immediate complication. Patient has big fistula with draining from the old G-tube site which will need continuation of wound care and nutrition for closure.     # Necrotizing pancreatitis, related to alcohol use  Supportive care per primary team. No GI intervention required at this time for pancreatitis.     Recommendations:  -- Plan for gastrocutaneous fistula (old PEG site) closure with gopal cross PMI suture next week 9/20/21 with .  -- NPO after midnight and stop tube feeding after midnight Sunday 9/19/21  -- Please put G-tube of the PEGJ to drainage to gravity.  -- Please avoid putting colostomy bag covering the fistula as this can cause more irritation to the surrounding skin  -- The three white buttons along the gastrostomy represent the external bumpers of the T tags and these should be removed by cutting the underlying blue strings in two weeks (9/28/21)  -- Continue wound care as per WOCN  -- Diet by mouth as tolerated, continue feeding tube via J-tube  -- Consider increase pain medication just prior to wound care for  "the healing purpose    Gastroenterology follow up recommendations: As per previously scheduled (Dr. Chacko 9/30/21), if prolonged hospitalization can notify GI prior to discharge for follow-up appointment.     Thank you for involving us in this patient's care. Please do not hesitate to contact the GI service with any questions or concerns.      Pt care plan discussed with Dr. Erazo, GI staff physician.    Patricio Mattson MD  Gastroenterology Fellow  Page [phone]  _______________________________________________________________    Subjective: Notified by team that patient has continual drainage from his old removed G-tube site. Colostomy bag was placed.     Objective:  Blood pressure (!) 91/32, pulse 118, temperature (!) 96.6  F (35.9  C), temperature source Oral, resp. rate 18, height 1.676 m (5' 6\"), weight 65.3 kg (143 lb 15.4 oz), SpO2 91 %.    Gen: A&Ox3, NAD  HEENT: sclera anicteric  CV: RRR  Lungs: breathing comfortably on room air  Abd: soft, nontender, distended, PEGJ tube in place without inflammation. Old G-tube site covered with colostomy bag with dark green content in the bag.  Skin: no jaundice, no stigmata of chronic liver disease  MS: decreased muscle mass  Neuro: non focal, no asterixis     LABS:  BMP  Recent Labs   Lab 09/17/21  0555 09/16/21  0533 09/15/21  0541 09/14/21  0544   * 131* 126* 127*   POTASSIUM 4.6 4.4 5.5* 5.2   CHLORIDE 93* 97 92* 94   JANENE 9.4 8.7 9.3 9.2   CO2 25 27 21 20   BUN 43* 30 52* 35*   CR 3.64* 2.68* 4.00* 3.48*   * 100* 142* 206*     CBC  Recent Labs   Lab 09/16/21  0533 09/13/21  0631 09/13/21  0631   WBC 10.7  --  11.5*   RBC 2.81*  --  2.69*   HGB 8.6*   < > 8.2*   HCT 27.5*  --  25.9*   MCV 98  --  96   MCH 30.6  --  30.5   MCHC 31.3*  --  31.7   RDW 15.9*  --  15.7*     --  266    < > = values in this interval not displayed.     INRNo lab results found in last 7 days.  LFTsNo lab results found in last 7 days.   PANCNo lab results found in " last 7 days.    IMAGING: no new in 24 hours.

## 2021-09-17 NOTE — PLAN OF CARE
/I: Pt is alert and oriented x4. Tachycardic and dyspneic on 3L oxygen via NC over night. Right Chest dialysis access intact. GJ tube patent with J--port running TF @ 60mL/hr with solution + enzymes/NaBicarb Q4h. G-port to gravity. Old GJ dressing saturated and copious requiring every hour dressing changes. Ostomy bag placed at 0650. Sticky note to MDs to enter WOC consult and erosion of abdominal wall r/t stomach content leak to the skin. Scheduled  oxycodone given for pain and Dilaudid oral PRN. Skin is jaundiced and fragile. Pt repositions independently in bed.   P: Will continue to monitor pt and follow POC.     Problem: Adult Inpatient Plan of Care  Goal: Absence of Hospital-Acquired Illness or Injury  Intervention: Identify and Manage Fall Risk  Recent Flowsheet Documentation  Taken 9/17/2021 0000 by Juan C Granados RN  Safety Promotion/Fall Prevention:   activity supervised   assistive device/personal items within reach   safety round/check completed  Intervention: Prevent Skin Injury  Recent Flowsheet Documentation  Taken 9/17/2021 0424 by Juan C Granados RN  Body Position: refuses positioning  Taken 9/17/2021 0200 by Juan C Granados RN  Body Position: refuses positioning  Taken 9/17/2021 0000 by Juan C Granados RN  Body Position: refuses positioning  Intervention: Prevent and Manage VTE (Venous Thromboembolism) Risk  Recent Flowsheet Documentation  Taken 9/17/2021 0000 by Juan C Granados RN  VTE Prevention/Management:   bleeding risk assessed   bleeding precautions maintained  Intervention: Prevent Infection  Recent Flowsheet Documentation  Taken 9/17/2021 0000 by Juan C Granados RN  Infection Prevention:   single patient room provided   rest/sleep promoted  Goal: Optimal Comfort and Wellbeing  Intervention: Provide Person-Centered Care  Recent Flowsheet Documentation  Taken 9/17/2021 0000 by Juan C Granados RN  Trust  Relationship/Rapport:   care explained   emotional support provided   choices provided   questions encouraged   reassurance provided     Problem: Adjustment to Illness (Delirium)  Goal: Optimal Coping  Intervention: Optimize Psychosocial Adjustment to Delirium  Recent Flowsheet Documentation  Taken 9/17/2021 0000 by Juan C Granados RN  Family/Support System Care: self-care encouraged     Problem: Infection (Pancreatitis)  Goal: Infection Symptom Resolution  Intervention: Prevent or Manage Infection  Recent Flowsheet Documentation  Taken 9/17/2021 0000 by Juan C Granados RN  Isolation Precautions: contact precautions maintained     Problem: Respiratory Compromise (Pancreatitis)  Goal: Effective Oxygenation and Ventilation  Intervention: Optimize Oxygenation and Ventilation  Recent Flowsheet Documentation  Taken 9/17/2021 0000 by Juan C Granados RN  Cough And Deep Breathing: done with encouragement  Activity Management:   activity adjusted per tolerance   activity encouraged  Head of Bed (HOB) Positioning: HOB at 20-30 degrees     Problem: Infection  Goal: Absence of Infection Signs and Symptoms  Intervention: Prevent or Manage Infection  Recent Flowsheet Documentation  Taken 9/17/2021 0000 by Juan C Granados RN  Isolation Precautions: contact precautions maintained     Problem: Violence Risk or Actual  Goal: Anger and Impulse Control  Intervention: Minimize Safety Risk  Recent Flowsheet Documentation  Taken 9/17/2021 0000 by Juan C Granados RN  Enhanced Safety Measures: bed alarm set     Problem: Adjustment to Illness (Liver Failure)  Goal: Optimal Coping with Liver Failure  Intervention: Support Response to Liver Disease  Recent Flowsheet Documentation  Taken 9/17/2021 0000 by Juan C Granados RN  Family/Support System Care: self-care encouraged     Problem: Respiratory Compromise (Liver Failure)  Goal: Effective Oxygenation and Ventilation  Intervention:  Promote Airway Secretion Clearance  Recent Flowsheet Documentation  Taken 9/17/2021 0000 by Juan C Granados RN  Cough And Deep Breathing: done with encouragement  Activity Management:   activity adjusted per tolerance   activity encouraged  Intervention: Optimize Oxygenation and Ventilation  Recent Flowsheet Documentation  Taken 9/17/2021 0000 by Juan C Granados RN  Head of Bed (HOB) Positioning: HOB at 20-30 degrees     Problem: Behavioral Health Comorbidity  Goal: Maintenance of Behavioral Health Symptom Control  Intervention: Maintain Behavioral Health Symptom Control  Recent Flowsheet Documentation  Taken 9/17/2021 0000 by Juan C Granados RN  Medication Review/Management: medications reviewed     Problem: Infection  Goal: Absence of Infection Signs and Symptoms  Intervention: Prevent or Manage Infection  Recent Flowsheet Documentation  Taken 9/17/2021 0000 by Juan C Granados RN  Isolation Precautions: contact precautions maintained     Problem: Pain Acute  Goal: Acceptable Pain Control and Functional Ability  Intervention: Prevent or Manage Pain  Recent Flowsheet Documentation  Taken 9/17/2021 0000 by Juan C Granados RN  Medication Review/Management: medications reviewed

## 2021-09-17 NOTE — PLAN OF CARE
Vital signs stable except for low blood pressure and tachycardia. On 5L NC. Up with lift in room. Pt declined TQ2h. Education completed on the importance of turning and skin breakdown. Pt declined assessment behind ears from oxygen tubing. Sacrum red, but blanchable. BM x3. Ostomy bag placed on old GJ site. Over 1000ml out per 8hrs. Abdomen and gera area reddened and painful. Barrier cream applied. WOC consulted and said to continue with ostomy for skin healing. LA 1.5. TF at 60ml/hr. G tube to gravity with low output. All meds through J tube. PRN medication given for pain. Very minimal oral intake. Refused asymptomatic COVID swab ordered 9/13. Dialysis run today. Plan for GI procedure on Monday. Will continue to monitor.

## 2021-09-17 NOTE — PROVIDER NOTIFICATION
"Provider notified (name): Dr Lozada  Reason for notification: BP 79/42, , Temp 96.2. Asymptomatic. Sepsis triggered so LA already ordered.   Recommendation/request given to provider: Any interventions?  Response from provider: Awaiting response      0922:  Provider notified (name): Dr Lozada  Reason for notification: Pt requesting more for pain medication. States he will \"refuse to go to dialysis until he gets more for pain\".  Recommendation/request given to provider: Can we increase dose/ do 1xdose?  Response from provider: Awaiting response      1400: Provider returned call about previous issues. No concern about low BP unless patient is symptomatic. Will consider ordering more PRN pain medication for dressing changes. MD alerted that with ostomy bag in place there typically will be no dressing changes so we will not be able to given that medication if indicated for dressing changes only.   "

## 2021-09-17 NOTE — CONSULTS
[facility] Nurse Inpatient Wound Assessment   Reason for consultation: Evaluate and treat Old GJ site wounds    Assessment  abdominal wounds due to Surgical Wound  Status: peristomal skin covered with urostomy pouch so unable to assess skin. Patient not interested in having me assess it as it feels better. RN stated that skin was red and intact.  Liquid in pouch appears to be urine???   Reviewed chart and nephrology is following   Treatment Plan  GI tube site - change every 3rd day and prn leakage   Cleanse with wound cleanser  Pat dry  Apply skin prep and let it dry.  Apply chente or adapt ring #[id number], (chente best for liquid effluent #[id number])  Cut hole in urostomy pouch (floor stock)  Connect urostomy adaptor #[id number] to ramos for bedside drainage when in bed     Orders updated and spoke with RN   Recommended provider order: recommend a liquid creatinine to check to see if that is urine or not.   Paged out attending and nephrology team.   WO Nurse follow-up plan:weekly  Nursing to notify the Provider(s) and re-consult the WO Nurse if wound(s) deteriorates or new skin concern.    Patient History  According to provider note(s):  Dax Villareal is a 30 year old male with PMHx significant for alcohol use disorder who has had a prolonged hospitalization at [facility] in [place] for acute alcohol hepatitis/end stage liver disease complicated by hepatic encephalopathy, acute renal failure requiring hemodialysis, acute hypoxemic respiratory failure and septic shock secondary to acute necrotizing pancreatitis. Transferred to [facility] ICU on 6/28/2021 for further management of necrotizing pancreatitis by interventional GI, possible necrosectomy.    Objective Data  Containment of urine/stool: Incontinence Protocol    Active Diet Order  Orders Placed This Encounter      Regular Diet Adult      Output:   I/O last 3 completed shifts:  In: 460 [P.O.:120]  Out: 375 [Emesis/NG output:375]    Risk Assessment:   Sensory Perception:  4-->no impairment  Moisture: 3-->occasionally moist  Activity: 2-->chairfast  Mobility: 2-->very limited  Nutrition: 3-->adequate  Friction and Shear: 2-->potential problem  Tyrel Score: 16                          Labs:   Recent Labs   Lab 09/16/21  0533   HGB 8.6*   WBC 10.7   CRP 23.0*       Physical Exam  Areas of skin assessed: focused old G-tube site     Wound Location:  Right upper quadrant  Date of last photo none taken today   Wound History: old tube site removed as it was coming out  Bag is full of yellow liquid - urine?      Interventions  Visual inspection and assessment completed   Wound Care Rationale Protect periwound skin, Provide protection  and contain fluid   Wound Care: completed by RN  Supplies: ordered: chente seal and urostomy adaptor   Current off-loading measures: Pillows under calves and Pillows  Current support surface: Standard  Atmos Air mattress  Education provided to: plan of care encouraged RN to connect to ramos bag to avoid the pouch overfilling. s he said she is going to.  Discussed plan of care with Patient and Nurse    Marlin Goldman RN BS CWOCN

## 2021-09-17 NOTE — PROGRESS NOTES
[facility]    Progress Note - [place] 3 Service        Date of Admission:  6/28/2021    Assessment & Plan             Dax Villareal is a 30 year old male with hx of alcohol use disorder admitted on 6/28/2021 after recent prolonged admission at [facility] in [place] who has severe acute alcoholic hepatitis c/b HE, ESRD requiring HD, and malnutrition in the setting of acute necrotizing pancreatitis, s/p PEG-J placement on 7/13. He arrived with acute respiratory failure and septic shock that have resolved, and his secondary bacterial peritonitis has been appropriately treated. He requires continued management for ESRD, hepatic encephalopathy, infected pancreatic pseudocyst, nutritional support via PEG-J, and persistent right pleural effusion. Clinically improving after multiple necrosectomies. Increasing leukocytosis and hypotension on 9/2 prompting resumption of IV antibiotics - has been stable since that time. Repeat CT with interval improvement in fluid collections.     Today:   - GI to close the PEG site on Monday - NPO MD on Sunday for procedure with tube feedings off and PEG to gravity drain   - Increased output from prior peg site - ostomy bag over wound   - Appreciate WOC for G-tube wound   - Convert back to packed gauze dressing from ostomy bag as able   - PRN IV hydromorphone for dressing changes      #Septic Shock, resolved  #Necrotizing alcoholic pancreatitis w/ infected pseudocyst, improving   #Hepatic Fluid collection, stable to improving  #Secondary Bacterial Peritonitis 2/2 infected pseudocyst with VRE, improving  Admitted to the [facility] ICU on 6/28/2021 from [facility] in [place] for septic shock due to necrotizing pancreatitis. CT on admit showing mature collection with enhancing wall measuring ~[id number]. IR placed perc drain 6/29. Repeat imaging 7/18 revealed walled off necrotic collection amenable to endoscopic transluminal drainage with  cystgastrostomy stenting with necrosectomy on 7/21/21. Complete necrosectomy on 8/11. Antibiotics (cefepime and metronidazole) were discontinued on 8/25 after left flank drain removed, re-initiated in the setting of fever on 9/2. Favor abdominal etiology, repeat CT on 9/7 with interval improvement in fluid collections - will complete 7d course of abx per ID with EOT 9/8.   - Will likely need intermittent antibiotics with fluid collections occasionally with bacterial growth moving forward as to be expected in nec panc      # Hypovolemia  Patient with substantial daily NG output and poor PO. Also note significant weight loss over the past few weeks. He has needed intermittent boluses of normal saline for euvolemia. Have discussed with renal multiple times - please re-evaluate volume status vs dry weight as he is often hypotensive following fluid removal.   - Weight 78kg on 8/13. Now 55 kg. Concern for weight loss vs. targeting dry weight.  -Monitor vitals and output post dialysis, consider fluids prn.     #Gastric Outlet Obstruction  High G tube output due to the cystogastric connection/fluid drainage noted on 8/5. Per GI, possibly related to gastric outlet from severe abdominal inflammation and is expected. KUB negative for obstructive gas pattern. Continues to have mild to moderate NG output - stable to improved. Relieved with draining G tube to gravity when experiencing pain/nausea.      #Hyponatremia, stable  Patient with down-trending sodium the week of 8/27; likely due to substantial NG output and low solute intake and hepatic dysfunction. Stable 127-128.     #Right Pancreatic Hydrothorax, stable  Increased dyspnea with CXR on 7/19 with further imaging and thoracentesis confirming a hepatic/pancreatic hydrothorax. Thoracentesis PRN for dyspnea, most recently 9/3.      #Alcoholic Hepatitis  #Coagulation Defect  Received 1 week of steroids at [facility] for elevated INR and altered mental status. Not a liver  transplant candidate. Mental status improved with aggressive lactulose and rifaxamin, which was discontinued without return of AMS.     ESRD due to ATN  Hyperphosphatemia  Stage III RADHA due to ATN from septic shock without recovery for >3 months. Line is [facility]. Nephrology dialyzed on Tu, Th, Sa schedule.  - Will need follow-up and outpatient dialysis  - Sevelemer powder TID with TF for hyperphosphatemia      #Anemia due to Chronic Illness  #Hematochezia, resolved  #Hemoperitoneum  Baseline hgb 17. Running between 6-8 in setting of chronic illness, new ESRD with epocrit per Nephrology, frequent lab draws and procedures. Hematochezia on 8/8 after starting standard intensity heparin, now resolved. Paracentesis on 8/21 with hemoperitoneum. No active bleeding. Required a unit of PRBC on 9/4 in the setting of several fluid boluses.   - Goal hgb > 7.0, trend Hgb  - Hgb has been stable.      #Alcohol Use Disorder  Counseled patient on complete alcohol cessation. Not interested in additional services at this time. Continue daily folic acid and thiamine.     #Lactic Acidosis  Intermittent Lactic acidosis in the setting of sepsis, ESRD, bleeding.     #Hypokalemia  Noted in the setting of high gastric output and improved once G-tube output decreased. CTM.      #Severe Malnutrition  Poor oral intake in the setting of necrotizing pancreatitis. PEG tube placed 7/13, replaced 9/14. Dietitian consulted and continuous tube feeds with goal of 65 ml/hr with continuous pancreatic enzymes. Transitioning to renal formula.   - Continuous tube feeds 50 mL overnight.        Diet: Adult Formula Drip Feeding: Continuous Vital 1.5; Jejunostomy; Goal Rate: 60; mL/hr; Medication - Feeding Tube Flush Frequency: At least 15-30 mL water before and after medication administration and with tube clogging; Amount to Send (Nutrition us...  Regular Diet Adult    DVT Prophylaxis: Pneumatic Compression Devices  Rdz Catheter: Not present  Fluids:  None   Central Lines: PRESENT  PICC Triple Lumen 06/28/21 Left-Site Assessment: WDL  CVC Double Lumen Right-Site Assessment: WDL  Code Status: Full Code      Disposition Plan   Expected discharge: 09/20/2021   recommended to LTC or nursing home  once safe disposition plan/ TCU bed available and G tube site healing .     The patient's care was discussed with the Attending Physician, Dr. Coon, Bedside Nurse, Patient and Patient's Family.    Tri Lozada MD  [address]  Securely message with the Vocera Web Console (learn more here)  Text page via [facility] Paging/Directory  Please see sign in/sign out for up to date coverage information    Clinically Significant Risk Factors Present on Admission                ______________________________________________________________________    Interval History   Dax reports abdominal pain and complains that the prior PEG site had a large quantity of drainage - requiring dressing changes every hour overnight. He denies fevers/chills, nausea, vomiting, or worsening erythema. He requests pain medication prior to dialysis. He reports that he will defer the thoracentesis until tomorrow.     Data reviewed today: I reviewed all medications, new labs and imaging results over the last 24 hours. I personally reviewed no images or EKG's today.    Physical Exam   Vital Signs: Temp: (!) 96.6  F (35.9  C) Temp src: Oral BP: 91/54 Pulse: 112   Resp: 18 SpO2: 99 % O2 Device: Nasal cannula Oxygen Delivery: 6 LPM  Weight: 143 lbs 8.31 oz  Physical Exam  Vitals and nursing note reviewed.   Constitutional:       Comments: Mild distress   HENT:      Head: Normocephalic.      Mouth/Throat:      Mouth: Mucous membranes are moist.   Eyes:      Conjunctiva/sclera: Conjunctivae normal.      Pupils: Pupils are equal, round, and reactive to light.   Cardiovascular:      Rate and Rhythm: Normal rate and regular rhythm.      Heart sounds: Normal  heart sounds.   Pulmonary:      Breath sounds: No wheezing or rhonchi.      Comments: Diminished on R  Abdominal:      Tenderness: There is abdominal tenderness.      Comments: Would not let me touch his abdomen for a full assessment.     Prior PEG site with ostomy bag in place for the large volume output      Musculoskeletal:         General: Deformity present.      Right lower leg: No edema.      Left lower leg: No edema.   Skin:     General: Skin is warm and dry.   Neurological:      Mental Status: He is alert.           Data   Recent Labs   Lab 09/17/21  0555 09/16/21  0533 09/15/21  0541 09/13/21  1545 09/13/21  0631   WBC  --  10.7  --   --  11.5*   HGB  --  8.6*  --   --  8.2*   MCV  --  98  --   --  96   PLT  --  290  --   --  266   * 131* 126*   < > 125*   POTASSIUM 4.6 4.4 5.5*   < > 5.9*   CHLORIDE 93* 97 92*   < > 94   CO2 25 27 21   < > 22   BUN 43* 30 52*   < > 51*   CR 3.64* 2.68* 4.00*   < > 4.59*   ANIONGAP 10 7 13   < > 9   JANENE 9.4 8.7 9.3   < > 9.9   * 100* 142*   < > 84    < > = values in this interval not displayed.     No results found for this or any previous visit (from the past 24 hour(s)).  Medications     - MEDICATION INSTRUCTIONS -       dextrose       heparin (porcine) 1,000 Units/hr (08/24/21 0907)       sodium bicarbonate  325 mg Per Feeding Tube Q4H    And     amylase-lipase-protease  1-2 capsule Per Feeding Tube Q4H     amylase-lipase-protease  2 capsule Oral TID w/meals     B and C vitamin Complex with folic acid  5 mL Per Feeding Tube Daily     epoetin charles-epbx (RETACRIT) inj ESRD  4,000 Units Intravenous Once in dialysis/CRRT     fiber modular (NUTRISOURCE FIBER)  1 packet Per Feeding Tube TID     folic acid  1 mg Oral Daily     heparin lock flush  5-20 mL Intracatheter Q24H     melatonin  6 mg Oral At Bedtime     menthol   Transdermal Q8H     oxyCODONE  5 mg Oral Q8H     pantoprazole (PROTONIX) IV  40 mg Intravenous Daily with breakfast     protein modular  1 packet  Per Feeding Tube Daily     sevelamer carbonate (RENVELA)  0.8 g Oral TID     sodium chloride (PF)  9 mL Intracatheter During Dialysis/CRRT (from stock)     sodium chloride (PF)  9 mL Intracatheter During Dialysis/CRRT (from stock)     vitamin B1  100 mg Oral Daily    Or     thiamine  100 mg Intravenous Daily

## 2021-09-17 NOTE — PROGRESS NOTES
Nephrology Progress Note  09/17/2021         Dax Villareal is a 30 year old male with h/o ETOH hepatitis, end stage liver disease, RADHA on HD, transferred from [facility] in [place] for septic shock on 6/28/21 for treatment of necrotizing pancreatitis and decompensated liver disease. He was initially admitted to [facility] 5/19/21 and started on RRT 5/26/2021.         Assessment & Recommendations:   ESRD on HD-Cr was 0.8 as recently as 5/19/2021 but now HD dependent for 3 months, started RRT at [facility] prior to transfer to [facility] on 5/26.  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  continuing restorative cares for now, BP's have been stable enough to support HD.  Had GI stenting of pancreas x4.                 -Line is TDC from [facility]               - HD today with out UF               -CXR to evaluate increased O2 needs.       Volume status-Anuric, wt at low for his course, not pulling fluid as his drain output is significant, wt's are unreliable.  chest xray with right sided pleural effusion, pul edema left is less likely.     Electrolytes/pH-Na chronically low but improve with dialysis.  K 4.6 and bicarb 25.       Ca/phos/pth-Ca 9.4, Mg 2.2, Phos has been up but relatively controlled likely due to being intermittently NPO.                    Anemia-Hgb low at 8.6, acute management per team, last PRBC's 7/14.  Started on EPO 5k with runs, iron sats 43%      Nutrition-Taking PO, appetite poor lately, would change to renal formula to limit K and Phos.      Recommendations were communicated to primary team via this note    Jacquelyn Arellano MD   [phone]    Interval History :   Nursing and provider notes from last 24 hours reviewed.  In the last 24 hours Dax Villareal declining from hemodynamic stand point including worsening 02 requirements.    Review of Systems:   I reviewed the following systems:  GI: no nausea or vomiting or diarrhea.  "  Neuro:  no confusion  Constitutional:  no fever or chills  CV: no chest pain.    Physical Exam:   I/O last 3 completed shifts:  In: 460 [P.O.:120]  Out: 375 [Emesis/NG output:375]   BP (!) 91/32 (BP Location: Left arm)   Pulse 118   Temp (!) 96.6  F (35.9  C) (Oral)   Resp 18   Ht 1.676 m (5' 6\")   Wt 65.1 kg (143 lb 8.3 oz)   SpO2 91%   BMI 23.16 kg/m       General : Pt awake, in acute distress   Lungs : decreased right sided BS  Cardiac : S1, S2 present  Abdomen : very distended abdomen with a out put from PEG tube insertion site  LE : no Edema noted  Dialysis Access : right perm cath    Labs:   All labs reviewed by me  Electrolytes/Renal - Recent Labs   Lab Test 09/17/21  0555 09/16/21  0533 09/15/21  0541 09/13/21  1545 09/13/21  0631   * 131* 126*   < > 125*   POTASSIUM 4.6 4.4 5.5*   < > 5.9*   CHLORIDE 93* 97 92*   < > 94   CO2 25 27 21   < > 22   BUN 43* 30 52*   < > 51*   CR 3.64* 2.68* 4.00*   < > 4.59*   * 100* 142*   < > 84   JANENE 9.4 8.7 9.3   < > 9.9   MAG 2.2 2.0 2.3   < > 2.2   PHOS  --  5.5* 8.6*  --  9.0*    < > = values in this interval not displayed.       CBC -   Recent Labs   Lab Test 09/16/21  0533 09/13/21  0631 09/10/21  0741   WBC 10.7 11.5* 11.3*   HGB 8.6* 8.2* 8.2*    266 230       LFTs -   Recent Labs   Lab Test 09/04/21  0414 09/03/21  0523 09/02/21  1730   ALKPHOS 81 95 120   BILITOTAL 1.4* 2.0* 2.2*   ALT 29 31 40   AST 38 39 57*   PROTTOTAL 8.8 9.2* 10.7*   ALBUMIN 2.9* 2.3* 2.7*       Iron Panel -   Recent Labs   Lab Test 08/14/21 2055 07/19/21  0632   IRON 28* 31*   IRONSAT 21 43   JERRELL 2,186*  --      Current Medications:    sodium chloride 0.9%  250 mL Intravenous Once in dialysis/CRRT     sodium chloride 0.9%  300 mL Hemodialysis Machine Once     sodium bicarbonate  325 mg Per Feeding Tube Q4H    And     amylase-lipase-protease  1-2 capsule Per Feeding Tube Q4H     amylase-lipase-protease  2 capsule Oral TID w/meals     B and C vitamin Complex with " folic acid  5 mL Per Feeding Tube Daily     epoetin charles-epbx (RETACRIT) inj ESRD  4,000 Units Intravenous Once in dialysis/CRRT     fiber modular (NUTRISOURCE FIBER)  1 packet Per Feeding Tube TID     folic acid  1 mg Oral Daily     heparin lock flush  5-20 mL Intracatheter Q24H     melatonin  6 mg Oral At Bedtime     menthol   Transdermal Q8H     - MEDICATION INSTRUCTIONS -   Does not apply Once     oxyCODONE  5 mg Oral Q8H     pantoprazole (PROTONIX) IV  40 mg Intravenous Daily with breakfast     protein modular  1 packet Per Feeding Tube Daily     sevelamer carbonate (RENVELA)  0.8 g Oral TID     sodium chloride (PF)  9 mL Intracatheter During Dialysis/CRRT (from stock)     sodium chloride (PF)  9 mL Intracatheter During Dialysis/CRRT (from stock)     vitamin B1  100 mg Oral Daily    Or     thiamine  100 mg Intravenous Daily       - MEDICATION INSTRUCTIONS -       dextrose       heparin (porcine) 1,000 Units/hr (08/24/21 0907)     Jacquelyn Arellano MD   Diverticulitis    Peritonitis

## 2021-09-18 LAB
ANION GAP SERPL CALCULATED.3IONS-SCNC: 11 MMOL/L (ref 3–14)
BUN SERPL-MCNC: 26 MG/DL (ref 7–30)
CALCIUM SERPL-MCNC: 8.9 MG/DL (ref 8.5–10.1)
CHLORIDE BLD-SCNC: 95 MMOL/L (ref 94–109)
CO2 SERPL-SCNC: 23 MMOL/L (ref 20–32)
CREAT SERPL-MCNC: 2.57 MG/DL (ref 0.66–1.25)
ERYTHROCYTE [DISTWIDTH] IN BLOOD BY AUTOMATED COUNT: 16.1 % (ref 10–15)
GFR SERPL CREATININE-BSD FRML MDRD: 32 ML/MIN/1.73M2
GLUCOSE BLD-MCNC: 127 MG/DL (ref 70–99)
HCT VFR BLD AUTO: 27.6 % (ref 40–53)
HGB BLD-MCNC: 8.6 G/DL (ref 13.3–17.7)
LACTATE SERPL-SCNC: 1.6 MMOL/L (ref 0.7–2)
MAGNESIUM SERPL-MCNC: 2 MG/DL (ref 1.6–2.3)
MCH RBC QN AUTO: 30.1 PG (ref 26.5–33)
MCHC RBC AUTO-ENTMCNC: 31.2 G/DL (ref 31.5–36.5)
MCV RBC AUTO: 97 FL (ref 78–100)
PHOSPHATE SERPL-MCNC: 6.2 MG/DL (ref 2.5–4.5)
PLATELET # BLD AUTO: 264 10E3/UL (ref 150–450)
POTASSIUM BLD-SCNC: 3.8 MMOL/L (ref 3.4–5.3)
RBC # BLD AUTO: 2.86 10E6/UL (ref 4.4–5.9)
SODIUM SERPL-SCNC: 129 MMOL/L (ref 133–144)
WBC # BLD AUTO: 10.7 10E3/UL (ref 4–11)

## 2021-09-18 PROCEDURE — 250N000013 HC RX MED GY IP 250 OP 250 PS 637: Performed by: INTERNAL MEDICINE

## 2021-09-18 PROCEDURE — 258N000003 HC RX IP 258 OP 636

## 2021-09-18 PROCEDURE — 83605 ASSAY OF LACTIC ACID: CPT | Performed by: HOSPITALIST

## 2021-09-18 PROCEDURE — 250N000011 HC RX IP 250 OP 636: Performed by: INTERNAL MEDICINE

## 2021-09-18 PROCEDURE — 36592 COLLECT BLOOD FROM PICC: CPT | Performed by: HOSPITALIST

## 2021-09-18 PROCEDURE — 82310 ASSAY OF CALCIUM: CPT

## 2021-09-18 PROCEDURE — 85027 COMPLETE CBC AUTOMATED: CPT

## 2021-09-18 PROCEDURE — C9113 INJ PANTOPRAZOLE SODIUM, VIA: HCPCS | Performed by: INTERNAL MEDICINE

## 2021-09-18 PROCEDURE — 120N000002 HC R&B MED SURG/OB UMMC

## 2021-09-18 PROCEDURE — 83735 ASSAY OF MAGNESIUM: CPT | Performed by: INTERNAL MEDICINE

## 2021-09-18 PROCEDURE — 99232 SBSQ HOSP IP/OBS MODERATE 35: CPT | Mod: GC | Performed by: HOSPITALIST

## 2021-09-18 PROCEDURE — 84100 ASSAY OF PHOSPHORUS: CPT

## 2021-09-18 PROCEDURE — 99207 PR CDG-MDM COMPONENT: MEETS MODERATE - DOWN CODED: CPT | Performed by: HOSPITALIST

## 2021-09-18 PROCEDURE — P9041 ALBUMIN (HUMAN),5%, 50ML: HCPCS

## 2021-09-18 PROCEDURE — 250N000011 HC RX IP 250 OP 636

## 2021-09-18 PROCEDURE — 36592 COLLECT BLOOD FROM PICC: CPT | Performed by: INTERNAL MEDICINE

## 2021-09-18 RX ORDER — ALBUMIN, HUMAN INJ 5% 5 %
25 SOLUTION INTRAVENOUS ONCE
Status: COMPLETED | OUTPATIENT
Start: 2021-09-18 | End: 2021-09-18

## 2021-09-18 RX ADMIN — Medication 1 PACKET: at 13:18

## 2021-09-18 RX ADMIN — OXYCODONE HYDROCHLORIDE 5 MG: 5 SOLUTION ORAL at 11:38

## 2021-09-18 RX ADMIN — SODIUM BICARBONATE 325 MG: 325 TABLET ORAL at 03:34

## 2021-09-18 RX ADMIN — SODIUM CHLORIDE 500 ML: 9 INJECTION, SOLUTION INTRAVENOUS at 03:34

## 2021-09-18 RX ADMIN — SODIUM BICARBONATE 325 MG: 325 TABLET ORAL at 13:18

## 2021-09-18 RX ADMIN — Medication 1 PACKET: at 20:40

## 2021-09-18 RX ADMIN — PANTOPRAZOLE SODIUM 40 MG: 40 INJECTION, POWDER, FOR SOLUTION INTRAVENOUS at 08:00

## 2021-09-18 RX ADMIN — ALBUMIN HUMAN 25 G: 0.05 INJECTION, SOLUTION INTRAVENOUS at 09:18

## 2021-09-18 RX ADMIN — PANCRELIPASE 2 CAPSULE: 24000; 76000; 120000 CAPSULE, DELAYED RELEASE PELLETS ORAL at 03:41

## 2021-09-18 RX ADMIN — SEVELAMER CARBONATE 0.8 G: 800 POWDER, FOR SUSPENSION ORAL at 15:07

## 2021-09-18 RX ADMIN — PANCRELIPASE 2 CAPSULE: 24000; 76000; 120000 CAPSULE, DELAYED RELEASE PELLETS ORAL at 16:36

## 2021-09-18 RX ADMIN — SODIUM BICARBONATE 325 MG: 325 TABLET ORAL at 07:59

## 2021-09-18 RX ADMIN — PANCRELIPASE 2 CAPSULE: 24000; 76000; 120000 CAPSULE, DELAYED RELEASE PELLETS ORAL at 07:59

## 2021-09-18 RX ADMIN — Medication 1 PACKET: at 08:01

## 2021-09-18 RX ADMIN — HYDROMORPHONE HYDROCHLORIDE 1 MG: 1 SOLUTION ORAL at 11:38

## 2021-09-18 RX ADMIN — HYDROMORPHONE HYDROCHLORIDE 1 MG: 1 SOLUTION ORAL at 08:00

## 2021-09-18 RX ADMIN — MELATONIN TAB 3 MG 6 MG: 3 TAB at 00:53

## 2021-09-18 RX ADMIN — OXYCODONE HYDROCHLORIDE 5 MG: 5 SOLUTION ORAL at 03:34

## 2021-09-18 RX ADMIN — Medication 1 PACKET: at 08:00

## 2021-09-18 RX ADMIN — THIAMINE HCL TAB 100 MG 100 MG: 100 TAB at 08:00

## 2021-09-18 RX ADMIN — FOLIC ACID 1 MG: 1 TABLET ORAL at 08:00

## 2021-09-18 RX ADMIN — Medication 5 ML: at 05:37

## 2021-09-18 RX ADMIN — PANCRELIPASE 2 CAPSULE: 24000; 76000; 120000 CAPSULE, DELAYED RELEASE PELLETS ORAL at 13:18

## 2021-09-18 RX ADMIN — HYDROMORPHONE HYDROCHLORIDE 1 MG: 1 SOLUTION ORAL at 15:06

## 2021-09-18 RX ADMIN — Medication 5 ML: at 21:33

## 2021-09-18 RX ADMIN — HYDROMORPHONE HYDROCHLORIDE 1 MG: 1 SOLUTION ORAL at 00:53

## 2021-09-18 RX ADMIN — HYDROMORPHONE HYDROCHLORIDE 1 MG: 1 SOLUTION ORAL at 18:34

## 2021-09-18 RX ADMIN — Medication 15 ML: at 16:36

## 2021-09-18 RX ADMIN — SEVELAMER CARBONATE 0.8 G: 800 POWDER, FOR SUSPENSION ORAL at 20:40

## 2021-09-18 RX ADMIN — SODIUM BICARBONATE 325 MG: 325 TABLET ORAL at 16:36

## 2021-09-18 RX ADMIN — ONDANSETRON 4 MG: 2 INJECTION INTRAMUSCULAR; INTRAVENOUS at 11:35

## 2021-09-18 RX ADMIN — OXYCODONE HYDROCHLORIDE 5 MG: 5 SOLUTION ORAL at 20:40

## 2021-09-18 RX ADMIN — ONDANSETRON 4 MG: 2 INJECTION INTRAMUSCULAR; INTRAVENOUS at 03:35

## 2021-09-18 RX ADMIN — Medication 5 ML: at 07:59

## 2021-09-18 RX ADMIN — SODIUM BICARBONATE 325 MG: 325 TABLET ORAL at 20:40

## 2021-09-18 RX ADMIN — PANCRELIPASE 2 CAPSULE: 24000; 76000; 120000 CAPSULE, DELAYED RELEASE PELLETS ORAL at 20:53

## 2021-09-18 ASSESSMENT — MIFFLIN-ST. JEOR: SCORE: 1547.75

## 2021-09-18 ASSESSMENT — ACTIVITIES OF DAILY LIVING (ADL)
ADLS_ACUITY_SCORE: 18

## 2021-09-18 NOTE — PLAN OF CARE
Vital signs stable except for tachycardia and soft blood pressures. 1xdose albumin given. Old GJ site continues to drain. Increased output from G tube to gravity. BM x2. Refused oral cares, turning, or up to chair. Coccyx red, but blanchable. Feet elevated. Prn dilaudid and zofran given. TF@60ml/hr. Pt sleeping throughout shift. Sipping on applejuice, but otherwise no oral intake. Plan was for thoracentesis today, but it was not done. Pt agreeable to procedure. Will continue to monitor.

## 2021-09-18 NOTE — PROVIDER NOTIFICATION
Provider notified (name): Dr. Makenna Leroy  Reason for notification: 0630 BP 85/32 despite fluids overnight  Recommendation/request given to provider: Consider midodrine/long term hypotension treatment. Pressors?   Response from provider:

## 2021-09-18 NOTE — PLAN OF CARE
Assumed Cares: [id number]      Significant Events: Hypotensive x2, 500ml bolus x2, PRN zofran x1, prn dilaudid x1, atarax x1    Vitals: Hypotensive on 5lpm via NC and oxymask per pt preference   LDAs: Right PICC, saline locked. GJ tube hooked to gravity drainage and tube feeding at goal.  Neuro: WDL  Respiratory: Crackles in upper lobes, diminished in lower lobes. No cough noted this shift.   Cardiac: Hypotensive x2, denies chest pain   GI/: G tube site x2 draining copious green/bile drainage. No BM this shift.   Nutrition: Tube feeding running at goal rate, regular diet   Pain: PRN dilaudid x1, scheduled oxy given. Minimally effective for pain     Plan of care:  Continue with POC, update treatment team as needed with changes. Continue to closely monitor BP.

## 2021-09-18 NOTE — PROVIDER NOTIFICATION
Provider notified (name): Makenna Leroy  Reason for notification: 0200 BP 72/33 after fluid bolus administration.   Recommendation/request given to provider:  Response from provider: Ordered 500mL bolus. Recheck BP in 30.       0410: 30 minute recheck 83/37.

## 2021-09-18 NOTE — PROGRESS NOTES
[facility]    Progress Note - [facility]oon 3 Service        Date of Admission:  6/28/2021    Assessment & Plan             Dax Villareal is a 30 year old male with hx of alcohol use disorder admitted on 6/28/2021 after recent prolonged admission at [facility] in [place] who has severe acute alcoholic hepatitis c/b HE, ESRD requiring HD, and malnutrition in the setting of acute necrotizing pancreatitis, s/p PEG-J placement on 7/13. He arrived with acute respiratory failure and septic shock that have resolved, and his secondary bacterial peritonitis has been appropriately treated. He requires continued management for ESRD, hepatic encephalopathy, infected pancreatic pseudocyst, nutritional support via PEG-J, and persistent right pleural effusion. Clinically improving after multiple necrosectomies. Increasing leukocytosis and hypotension on 9/2 prompting resumption of IV antibiotics - has been stable since that time. Repeat CT with interval improvement in fluid collections.     Today:   - GI to close the PEG site on Monday - NPO MD on Sunday for procedure with tube feedings off and PEG to gravity drain   - Thoracentesis today   - Hypotensive overnight - received 1LNS and 25g albumin 5%   - Encouraged movement, participating in repositioning, and working with PT once PEG site is closed      #Septic Shock, resolved  #Necrotizing alcoholic pancreatitis w/ infected pseudocyst, improving   #Hepatic Fluid collection, stable to improving  #Secondary Bacterial Peritonitis 2/2 infected pseudocyst with VRE, improving  Admitted to the [facility] ICU on 6/28/2021 from [facility] in [place] for septic shock due to necrotizing pancreatitis. CT on admit showing mature collection with enhancing wall measuring ~[id number]. IR placed perc drain 6/29. Repeat imaging 7/18 revealed walled off necrotic collection amenable to endoscopic transluminal drainage with cystgastrostomy stenting with  necrosectomy on 7/21/21. Complete necrosectomy on 8/11. Antibiotics (cefepime and metronidazole) were discontinued on 8/25 after left flank drain removed, re-initiated in the setting of fever on 9/2. Favor abdominal etiology, repeat CT on 9/7 with interval improvement in fluid collections - will complete 7d course of abx per ID with EOT 9/8.   - Will likely need intermittent antibiotics with fluid collections occasionally with bacterial growth moving forward as to be expected in nec panc      #G-Tube Fistula   Dax had been having increasing G-tube output and was found to have an erythematous indurated area next to his G-tube insertion. This was found to be his G-tube balloon pushing through the skin. He underwent G-tube replacement on  9/13/21. His old G-tube site has since had large volume output requiring frequent wound care dressing changes.   - GI to close the PEG site on Monday - NPO MD on Sunday for procedure with tube feedings off and PEG to gravity drain     # Hypovolemia  Patient with substantial daily NG output and poor PO. Also note significant weight loss over the past few weeks. He has needed intermittent boluses of normal saline for euvolemia. Have discussed with renal multiple times - please re-evaluate volume status vs dry weight as he is often hypotensive following fluid removal.   - Weight 78kg on 8/13. Now 55 kg. Concern for weight loss vs. targeting dry weight.  -Monitor vitals and output post dialysis, consider fluids prn.  - Consider discussing using midodrine with Nephrology      #Gastric Outlet Obstruction  High G tube output due to the cystogastric connection/fluid drainage noted on 8/5. Per GI, possibly related to gastric outlet from severe abdominal inflammation and is expected. KUB negative for obstructive gas pattern. Continues to have mild to moderate NG output - stable to improved. Relieved with draining G tube to gravity when experiencing pain/nausea.      #Hyponatremia,  stable  Patient with down-trending sodium the week of 8/27; likely due to substantial NG output and low solute intake and hepatic dysfunction. Stable 127-128.     #Right Pancreatic Hydrothorax, stable  Increased dyspnea with CXR on 7/19 with further imaging and thoracentesis confirming a hepatic/pancreatic hydrothorax. Thoracentesis PRN for dyspnea, most recently 9/3.      #Alcoholic Hepatitis  #Coagulation Defect  Received 1 week of steroids at [facility] for elevated INR and altered mental status. Not a liver transplant candidate. Mental status improved with aggressive lactulose and rifaxamin, which was discontinued without return of AMS.     ESRD due to ATN  Hyperphosphatemia  Stage III RADHA due to ATN from septic shock without recovery for >3 months. Line is TDC from [facility]. Nephrology dialyzed on Tu, Th, Sa schedule.  - Will need follow-up and outpatient dialysis  - Sevelemer powder TID with TF for hyperphosphatemia      #Anemia due to Chronic Illness  #Hematochezia, resolved  #Hemoperitoneum  Baseline hgb 17. Running between 6-8 in setting of chronic illness, new ESRD with epocrit per Nephrology, frequent lab draws and procedures. Hematochezia on 8/8 after starting standard intensity heparin, now resolved. Paracentesis on 8/21 with hemoperitoneum. No active bleeding. Required a unit of PRBC on 9/4 in the setting of several fluid boluses.   - Goal hgb > 7.0, trend Hgb  - Hgb has been stable.      #Alcohol Use Disorder  Counseled patient on complete alcohol cessation. Not interested in additional services at this time. Continue daily folic acid and thiamine.     #Lactic Acidosis  Intermittent Lactic acidosis in the setting of sepsis, ESRD, bleeding.     #Hypokalemia  Noted in the setting of high gastric output and improved once G-tube output decreased. CTM.      #Severe Malnutrition  Poor oral intake in the setting of necrotizing pancreatitis. PEG tube placed 7/13, replaced 9/14. Dietitian consulted and continuous  tube feeds with goal of 65 ml/hr with continuous pancreatic enzymes. Transitioning to renal formula.   - Continuous tube feeds 50 mL overnight.       Diet: Adult Formula Drip Feeding: Continuous Vital 1.5; Jejunostomy; Goal Rate: 60; mL/hr; Medication - Feeding Tube Flush Frequency: At least 15-30 mL water before and after medication administration and with tube clogging; Amount to Send (Nutrition us...  Regular Diet Adult    DVT Prophylaxis: Pneumatic Compression Devices  Rdz Catheter: Not present  Fluids: S/P 1L NS and 25g Albumin 5%   Central Lines: PRESENT  PICC Triple Lumen 06/28/21 Left-Site Assessment: WDL  CVC Double Lumen Right-Site Assessment: WDL  Code Status: Full Code      Disposition Plan   Expected discharge: 09/20/2021   recommended to LTC Facility  once safe disposition plan/ TCU bed available and healed G tube site.     The patient's care was discussed with the Attending Physician, Dr. Coon , Bedside Nurse, Patient and Patient's Family.    Tri Lozada MD  [address]  Securely message with the Vocera Web Console (learn more here)  Text page via [facility] Paging/Directory  Please see sign in/sign out for up to date coverage information    Clinically Significant Risk Factors Present on Admission                ______________________________________________________________________    Interval History   Dax reports ongoing abdominal pain and nausea this AM. He reports continued high volume output from his prior PEG site. He also endorses feeling tired and having back pain. Per nursing, Dax often refuses repositioning. He reports that he is sometimes able to lay on his side. We discussed working on being more active, with the goal of hopefully being able to use the electric wheelchair that had been donated to him.     Data reviewed today: I reviewed all medications, new labs and imaging results over the last 24 hours. I personally  reviewed no images or EKG's today.    Physical Exam   Vital Signs: Temp: (!) 96.7  F (35.9  C) Temp src: Oral BP: (!) 96/38 Pulse: 120   Resp: 18 SpO2: 92 % O2 Device: Nasal cannula Oxygen Delivery: 3 LPM  Weight: 141 lbs 5.04 oz  Physical Exam  Vitals and nursing note reviewed.   HENT:      Head: Normocephalic.      Mouth/Throat:      Mouth: Mucous membranes are moist.      Comments: Poor dentition    Eyes:      Conjunctiva/sclera: Conjunctivae normal.      Pupils: Pupils are equal, round, and reactive to light.   Pulmonary:      Comments: Diminished breath sounds at bilateral bases and up to mid-right lung.   Abdominal:      Tenderness: There is abdominal tenderness.      Comments: Ostomy with copious biliary output     Musculoskeletal:         General: Deformity present.      Right lower leg: No edema.      Left lower leg: No edema.   Skin:     General: Skin is warm and dry.      Comments: Erythema surrounding ostomy bag on old PEG site    Neurological:      Mental Status: He is alert.           Data   Recent Labs   Lab 09/18/21  0542 09/17/21  0555 09/16/21  0533 09/13/21  1545 09/13/21  0631   WBC 10.7  --  10.7  --  11.5*   HGB 8.6*  --  8.6*  --  8.2*   MCV 97  --  98  --  96     --  290  --  266   * 128* 131*   < > 125*   POTASSIUM 3.8 4.6 4.4   < > 5.9*   CHLORIDE 95 93* 97   < > 94   CO2 23 25 27   < > 22   BUN 26 43* 30   < > 51*   CR 2.57* 3.64* 2.68*   < > 4.59*   ANIONGAP 11 10 7   < > 9   JANENE 8.9 9.4 8.7   < > 9.9   * 120* 100*   < > 84    < > = values in this interval not displayed.     No results found for this or any previous visit (from the past 24 hour(s)).  Medications     - MEDICATION INSTRUCTIONS -       dextrose       heparin (porcine) 1,000 Units/hr (08/24/21 0907)       sodium bicarbonate  325 mg Per Feeding Tube Q4H    And     amylase-lipase-protease  1-2 capsule Per Feeding Tube Q4H     amylase-lipase-protease  2 capsule Oral TID w/meals     B and C vitamin Complex  with folic acid  5 mL Per Feeding Tube Daily     fiber modular (NUTRISOURCE FIBER)  1 packet Per Feeding Tube TID     folic acid  1 mg Oral Daily     heparin lock flush  5-20 mL Intracatheter Q24H     melatonin  6 mg Oral At Bedtime     menthol   Transdermal Q8H     oxyCODONE  5 mg Oral Q8H     pantoprazole (PROTONIX) IV  40 mg Intravenous Daily with breakfast     protein modular  1 packet Per Feeding Tube Daily     sevelamer carbonate (RENVELA)  0.8 g Oral TID     vitamin B1  100 mg Oral Daily    Or     thiamine  100 mg Intravenous Daily

## 2021-09-19 ENCOUNTER — ANESTHESIA EVENT (OUTPATIENT)
Dept: SURGERY | Facility: CLINIC | Age: 31
End: 2021-09-19
Payer: MEDICAID

## 2021-09-19 ENCOUNTER — APPOINTMENT (OUTPATIENT)
Dept: GENERAL RADIOLOGY | Facility: CLINIC | Age: 31
End: 2021-09-19
Attending: ORTHOPAEDIC SURGERY
Payer: MEDICAID

## 2021-09-19 LAB
ANION GAP SERPL CALCULATED.3IONS-SCNC: 16 MMOL/L (ref 3–14)
APPEARANCE FLD: CLEAR
BUN SERPL-MCNC: 51 MG/DL (ref 7–30)
CALCIUM SERPL-MCNC: 9.6 MG/DL (ref 8.5–10.1)
CHLORIDE BLD-SCNC: 87 MMOL/L (ref 94–109)
CO2 SERPL-SCNC: 23 MMOL/L (ref 20–32)
COLOR FLD: YELLOW
CREAT SERPL-MCNC: 3.85 MG/DL (ref 0.66–1.25)
CRP SERPL-MCNC: 58 MG/L (ref 0–8)
GFR SERPL CREATININE-BSD FRML MDRD: 20 ML/MIN/1.73M2
GLUCOSE BLD-MCNC: 114 MG/DL (ref 70–99)
HOLD SPECIMEN: NORMAL
HOLD SPECIMEN: NORMAL
INR PPP: 1.51 (ref 0.85–1.15)
LACTATE SERPL-SCNC: 1.4 MMOL/L (ref 0.7–2)
LYMPHOCYTES NFR FLD MANUAL: 35 %
MAGNESIUM SERPL-MCNC: 2.4 MG/DL (ref 1.6–2.3)
MONOS+MACROS NFR FLD MANUAL: NORMAL %
NEUTS BAND NFR FLD MANUAL: 40 %
OTHER CELLS FLD MANUAL: 26 %
POTASSIUM BLD-SCNC: 4.2 MMOL/L (ref 3.4–5.3)
SODIUM SERPL-SCNC: 126 MMOL/L (ref 133–144)
WBC # FLD AUTO: 458 /UL

## 2021-09-19 PROCEDURE — 36592 COLLECT BLOOD FROM PICC: CPT

## 2021-09-19 PROCEDURE — 89051 BODY FLUID CELL COUNT: CPT

## 2021-09-19 PROCEDURE — 36592 COLLECT BLOOD FROM PICC: CPT | Performed by: HOSPITALIST

## 2021-09-19 PROCEDURE — 250N000013 HC RX MED GY IP 250 OP 250 PS 637: Performed by: INTERNAL MEDICINE

## 2021-09-19 PROCEDURE — 80048 BASIC METABOLIC PNL TOTAL CA: CPT | Performed by: INTERNAL MEDICINE

## 2021-09-19 PROCEDURE — 250N000011 HC RX IP 250 OP 636: Performed by: INTERNAL MEDICINE

## 2021-09-19 PROCEDURE — 87015 SPECIMEN INFECT AGNT CONCNTJ: CPT

## 2021-09-19 PROCEDURE — 36592 COLLECT BLOOD FROM PICC: CPT | Performed by: INTERNAL MEDICINE

## 2021-09-19 PROCEDURE — 120N000002 HC R&B MED SURG/OB UMMC

## 2021-09-19 PROCEDURE — 83605 ASSAY OF LACTIC ACID: CPT | Performed by: HOSPITALIST

## 2021-09-19 PROCEDURE — 86140 C-REACTIVE PROTEIN: CPT | Performed by: INTERNAL MEDICINE

## 2021-09-19 PROCEDURE — 71045 X-RAY EXAM CHEST 1 VIEW: CPT | Mod: 26 | Performed by: RADIOLOGY

## 2021-09-19 PROCEDURE — 85610 PROTHROMBIN TIME: CPT

## 2021-09-19 PROCEDURE — 999N000044 HC STATISTIC CVC DRESSING CHANGE

## 2021-09-19 PROCEDURE — 32555 ASPIRATE PLEURA W/ IMAGING: CPT | Performed by: ORTHOPAEDIC SURGERY

## 2021-09-19 PROCEDURE — 83735 ASSAY OF MAGNESIUM: CPT | Performed by: INTERNAL MEDICINE

## 2021-09-19 PROCEDURE — C9113 INJ PANTOPRAZOLE SODIUM, VIA: HCPCS | Performed by: INTERNAL MEDICINE

## 2021-09-19 PROCEDURE — 99232 SBSQ HOSP IP/OBS MODERATE 35: CPT | Mod: 25 | Performed by: HOSPITALIST

## 2021-09-19 PROCEDURE — 999N000065 XR CHEST PORT 1 VIEW

## 2021-09-19 RX ADMIN — HYDROMORPHONE HYDROCHLORIDE 1 MG: 1 SOLUTION ORAL at 18:39

## 2021-09-19 RX ADMIN — PANCRELIPASE 2 CAPSULE: 24000; 76000; 120000 CAPSULE, DELAYED RELEASE PELLETS ORAL at 20:34

## 2021-09-19 RX ADMIN — PANCRELIPASE 2 CAPSULE: 24000; 76000; 120000 CAPSULE, DELAYED RELEASE PELLETS ORAL at 17:17

## 2021-09-19 RX ADMIN — OXYCODONE HYDROCHLORIDE 5 MG: 5 SOLUTION ORAL at 04:22

## 2021-09-19 RX ADMIN — SODIUM BICARBONATE 325 MG: 325 TABLET ORAL at 20:34

## 2021-09-19 RX ADMIN — OXYCODONE HYDROCHLORIDE 5 MG: 5 SOLUTION ORAL at 12:47

## 2021-09-19 RX ADMIN — Medication 5 ML: at 15:12

## 2021-09-19 RX ADMIN — OXYCODONE HYDROCHLORIDE 5 MG: 5 SOLUTION ORAL at 20:26

## 2021-09-19 RX ADMIN — HYDROMORPHONE HYDROCHLORIDE 1 MG: 1 SOLUTION ORAL at 06:40

## 2021-09-19 RX ADMIN — HYDROXYZINE HYDROCHLORIDE 50 MG: 25 TABLET, FILM COATED ORAL at 15:20

## 2021-09-19 RX ADMIN — PANCRELIPASE 2 CAPSULE: 24000; 76000; 120000 CAPSULE, DELAYED RELEASE PELLETS ORAL at 08:33

## 2021-09-19 RX ADMIN — FOLIC ACID 1 MG: 1 TABLET ORAL at 08:33

## 2021-09-19 RX ADMIN — THIAMINE HCL TAB 100 MG 100 MG: 100 TAB at 08:33

## 2021-09-19 RX ADMIN — HYDROMORPHONE HYDROCHLORIDE 1 MG: 1 SOLUTION ORAL at 01:00

## 2021-09-19 RX ADMIN — PANCRELIPASE 2 CAPSULE: 24000; 76000; 120000 CAPSULE, DELAYED RELEASE PELLETS ORAL at 04:23

## 2021-09-19 RX ADMIN — HYDROMORPHONE HYDROCHLORIDE 1 MG: 1 SOLUTION ORAL at 15:12

## 2021-09-19 RX ADMIN — SODIUM BICARBONATE 325 MG: 325 TABLET ORAL at 17:17

## 2021-09-19 RX ADMIN — SEVELAMER CARBONATE 0.8 G: 800 POWDER, FOR SUSPENSION ORAL at 15:20

## 2021-09-19 RX ADMIN — ONDANSETRON 4 MG: 2 INJECTION INTRAMUSCULAR; INTRAVENOUS at 13:44

## 2021-09-19 RX ADMIN — SODIUM BICARBONATE 325 MG: 325 TABLET ORAL at 08:33

## 2021-09-19 RX ADMIN — CALCIUM CARBONATE (ANTACID) CHEW TAB 500 MG 500 MG: 500 CHEW TAB at 13:12

## 2021-09-19 RX ADMIN — PANCRELIPASE 2 CAPSULE: 24000; 76000; 120000 CAPSULE, DELAYED RELEASE PELLETS ORAL at 12:48

## 2021-09-19 RX ADMIN — HYDROMORPHONE HYDROCHLORIDE 1 MG: 1 SOLUTION ORAL at 10:33

## 2021-09-19 RX ADMIN — SODIUM BICARBONATE 325 MG: 325 TABLET ORAL at 12:47

## 2021-09-19 RX ADMIN — ONDANSETRON 4 MG: 2 INJECTION INTRAMUSCULAR; INTRAVENOUS at 06:40

## 2021-09-19 RX ADMIN — MELATONIN TAB 3 MG 6 MG: 3 TAB at 00:50

## 2021-09-19 RX ADMIN — PANTOPRAZOLE SODIUM 40 MG: 40 INJECTION, POWDER, FOR SOLUTION INTRAVENOUS at 08:33

## 2021-09-19 RX ADMIN — Medication 1 PACKET: at 08:34

## 2021-09-19 RX ADMIN — PANCRELIPASE 2 CAPSULE: 24000; 76000; 120000 CAPSULE, DELAYED RELEASE PELLETS ORAL at 00:51

## 2021-09-19 RX ADMIN — SEVELAMER CARBONATE 0.8 G: 800 POWDER, FOR SUSPENSION ORAL at 08:33

## 2021-09-19 RX ADMIN — Medication 5 ML: at 08:34

## 2021-09-19 RX ADMIN — HYDROXYZINE HYDROCHLORIDE 50 MG: 25 TABLET, FILM COATED ORAL at 21:03

## 2021-09-19 RX ADMIN — HYDROXYZINE HYDROCHLORIDE 50 MG: 25 TABLET, FILM COATED ORAL at 10:42

## 2021-09-19 RX ADMIN — Medication 1 PACKET: at 12:48

## 2021-09-19 RX ADMIN — Medication 5 ML: at 12:42

## 2021-09-19 RX ADMIN — SODIUM BICARBONATE 325 MG: 325 TABLET ORAL at 04:23

## 2021-09-19 RX ADMIN — ACETAMINOPHEN 325 MG: 325 SOLUTION ORAL at 23:09

## 2021-09-19 RX ADMIN — SEVELAMER CARBONATE 0.8 G: 800 POWDER, FOR SUSPENSION ORAL at 20:26

## 2021-09-19 RX ADMIN — SODIUM BICARBONATE 325 MG: 325 TABLET ORAL at 00:51

## 2021-09-19 RX ADMIN — Medication 5 ML: at 07:15

## 2021-09-19 RX ADMIN — Medication 1 PACKET: at 20:27

## 2021-09-19 ASSESSMENT — LIFESTYLE VARIABLES: TOBACCO_USE: 1

## 2021-09-19 ASSESSMENT — MIFFLIN-ST. JEOR: SCORE: 1562.75

## 2021-09-19 ASSESSMENT — ACTIVITIES OF DAILY LIVING (ADL)
ADLS_ACUITY_SCORE: 18

## 2021-09-19 NOTE — PLAN OF CARE
Vital signs stable on RA. Up with lift in room. Pt refused Tq2h. BMx1. G tube to gravity. J tube with TF@60ml/hr. PICC dressing change done. CHG bath done. Pt agreed to oral cares x1. Pt refused Peg tube site care. Site very red/scabbed. Blanchable redness to coccyx. Bilateral feet dry and flaky. Pt refused lotion.  PRN dilaudid given for abdominal pain. Pt sipping on Mountain Dew, otherwise no oral intake. Refused to get up into chair today. Thoracentesis done at bedside, pt tolerated well. Site CDI. Plan for NPO at 0000 and TF stopped for GI procedure tomorrow. Will continue to monitor.

## 2021-09-19 NOTE — PROGRESS NOTES
Nephrology  Brief update:    Next HD session planned for tomorrow, Monday 9/20    Carina Ahuja MD

## 2021-09-19 NOTE — ANESTHESIA PREPROCEDURE EVALUATION
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]   Procedure : Procedure(s):  ESOPHAGOGASTRODUODENOSCOPY (EGD) with fistula closure     Past Medical History:   Diagnosis Date     2008      Past Surgical History:   Procedure Laterality Date     ENDOSCOPIC INSERTION TUBE GASTROSTOMY N/A 2021    Procedure: Upper endoscopy, INSERTION, PEG-J TUBE and Gastropexy;  Surgeon: Rayshawn Will MD;  Location: [facility] OR     ENDOSCOPIC INSERTION TUBE GASTROSTOMY  2021    Procedure: PEG/J PLACEMENT.;  Surgeon: Rayshawn Will MD;  Location: [facility] OR     ENDOSCOPIC ULTRASOUND UPPER GASTROINTESTINAL TRACT (GI) N/A 2021    Procedure: ENDOSCOPIC ULTRASOUND, ESOPHAGOSCOPY / UPPER GASTROINTESTINAL TRACT (GI) with cyst gastrostomy, dilation;  Surgeon: Guru Yecenia Chacko MD;  Location: [facility] OR     ENDOSCOPIC ULTRASOUND, ESOPHAGOSCOPY, GASTROSCOPY, DUODENOSCOPY (EGD), NECROSECTOMY N/A 2021    Procedure: ESOPHAGOGASTRODUODENOSCOPY (EGD) with necrosectomy, stent exchange.;  Surgeon: Amadou Beasley MD;  Location: [facility] OR     ESOPHAGOSCOPY, GASTROSCOPY, DUODENOSCOPY (EGD), COMBINED N/A 2021    Procedure: ESOPHAGOGASTRODUODENOSCOPY (EGD), gastrostomy apposition, Necrosectomy, stent exchange.;  Surgeon: Rayshawn Will MD;  Location: [facility] OR     ESOPHAGOSCOPY, GASTROSCOPY, DUODENOSCOPY (EGD), COMBINED N/A 2021    Procedure: ESOPHAGOGASTRODUODENOSCOPY (EGD) with necrosectomy, and stents exchanged;  Surgeon: Jose Quigley MD;  Location: [facility] OR     ESOPHAGOSCOPY, GASTROSCOPY, DUODENOSCOPY (EGD), COMBINED Left 2021    Procedure: ESOPHAGOGASTRODUODENOSCOPY (EGD), GASTROSTOMY TUBE REMOVAL, FISTULAR CLOSURE ENDOSCOPIC.;  Surgeon: Rayshawn Will MD;  Location: [facility] OR     IR ABSCESS TUBE CHANGE  2021     IR PARACENTESIS  2021     IR  "SINOGRAM INJECTION DIAGNOSTIC  8/25/2021     ORTHOPEDIC SURGERY Right     fracture repair     REPLACE GASTROSTOMY TUBE, PERCUTANEOUS N/A 7/28/2021    Procedure: gastro-jejunostomy tube exchange;  Surgeon: Rayshawn Will MD;  Location: [facility] OR      Allergies   Allergen Reactions     Tylenol [Acetaminophen] Itching      Social History     Tobacco Use     Smoking status: Current Every Day Smoker     Packs/day: 0.50     Types: Cigarettes     Smokeless tobacco: Never Used     Tobacco comment: 8/24/2021 Patient did not want to discuss quitting but would like 4 mg lozenge to use during admission. Patient also accepted \"Quitting for Good\" workbook   Substance Use Topics     Alcohol use: Yes      Wt Readings from Last 1 Encounters:   09/18/21 65 kg (143 lb 4.8 oz)        Anesthesia Evaluation   Pt has had prior anesthetic.     No history of anesthetic complications       ROS/MED HX  ENT/Pulmonary: Comment: Right hydrothorax with RLL collapse.     (+) tobacco use, Current use,     Neurologic:     (+) delerium,     Cardiovascular:     (+) -----Previous cardiac testing   Echo: Date: 6/28/21 Results:  The Ejection Fraction is estimated at 50-55%.  Global and regional left ventricular function is normal with an EF of 55-60%.  Right ventricular function, chamber size, wall motion, and thickness are  normal.  Dilation of the inferior vena cava is present with abnormal respiratory  variation in diameter.  No pericardial effusion is present.  There is no prior study for direct comparison.  Stress Test: Date: Results:    ECG Reviewed: Date: Results:    Cath: Date: Results:      METS/Exercise Tolerance:     Hematologic:     (+) anemia (chronic disease),     Musculoskeletal:       GI/Hepatic: Comment: Necrotizing pancreatitis 2/2 EtOH abuse    (+) liver disease (ESLD c/b hepatic encephalopathy),     Renal/Genitourinary:     (+) renal disease, type: ARF and ESRD, Pt requires dialysis, type: Hemodialysis,     Endo:  - neg endo ROS "   (+) Obesity,     Psychiatric/Substance Use: Comment: ADHD    (+) psychiatric history alcohol abuse     Infectious Disease: Comment: Sepsis    (+) VRE,     Malignancy:       Other:            Physical Exam    Airway  airway exam normal           Respiratory Devices and Support         Dental     Comment: Decayed dentition, denies any loose, bleeding along gum lines        Cardiovascular   cardiovascular exam normal          Pulmonary   pulmonary exam normal                OUTSIDE LABS:  CBC:   Lab Results   Component Value Date    WBC 10.7 09/18/2021    WBC 10.7 09/16/2021    HGB 8.6 (L) 09/18/2021    HGB 8.6 (L) 09/16/2021    HCT 27.6 (L) 09/18/2021    HCT 27.5 (L) 09/16/2021     09/18/2021     09/16/2021     BMP:   Lab Results   Component Value Date     (L) 09/19/2021     (L) 09/18/2021    POTASSIUM 4.2 09/19/2021    POTASSIUM 3.8 09/18/2021    CHLORIDE 87 (L) 09/19/2021    CHLORIDE 95 09/18/2021    CO2 23 09/19/2021    CO2 23 09/18/2021    BUN 51 (H) 09/19/2021    BUN 26 09/18/2021    CR 3.85 (H) 09/19/2021    CR 2.57 (H) 09/18/2021     (H) 09/19/2021     (H) 09/18/2021     COAGS:   Lab Results   Component Value Date    PTT 97 (H) 06/28/2021    INR 1.51 (H) 09/19/2021    FIBR 456 09/03/2021     POC:   Lab Results   Component Value Date     (H) 07/05/2021     HEPATIC:   Lab Results   Component Value Date    ALBUMIN 2.9 (L) 09/04/2021    PROTTOTAL 8.8 09/04/2021    ALT 29 09/04/2021    AST 38 09/04/2021    ALKPHOS 81 09/04/2021    BILITOTAL 1.4 (H) 09/04/2021    ADALBERTO 28 06/28/2021     OTHER:   Lab Results   Component Value Date    LACT 1.6 09/18/2021    JANENE 9.6 09/19/2021    PHOS 6.2 (H) 09/18/2021    MAG 2.4 (H) 09/19/2021    LIPASE 251 09/02/2021    TSH 5.48 (H) 08/19/2021    T4 1.18 08/19/2021    CRP 58.0 (H) 09/19/2021       Anesthesia Plan    ASA Status:  3   NPO Status:  NPO Appropriate    Anesthesia Type: General.     - Airway: ETT   Induction: Intravenous.    Maintenance: Balanced.   Techniques and Equipment:     - Lines/Monitors: PICC in situ     Consents    Anesthesia Plan(s) and associated risks, benefits, and realistic alternatives discussed. Questions answered and patient/representative(s) expressed understanding.     - Discussed with:  Patient      - Extended Intubation/Ventilatory Support Discussed: No.      - Patient is DNR/DNI Status: No    Use of blood products discussed: No .     Postoperative Care    Pain management: IV analgesics, Oral pain medications.   PONV prophylaxis: Ondansetron (or other 5HT-3), Dexamethasone or Solumedrol     Comments:         H&P reviewed: Unable to attach H&P to encounter due to EHR limitations. H&P Update: appropriate H&P reviewed, patient examined. No interval changes since H&P (within 30 days).         Hebert Phillips MD  Staff Anesthesiologist  [phone]

## 2021-09-19 NOTE — PLAN OF CARE
Assumed Cares: [phone]      Significant Events: PRN dilaudid x2, pt appeared to be sleeping between cares.     Vitals: VSS on 3lpm via NC   LDAs: Left PICC, saline locked, GJ tube to suction and tube feeding, respectively. Ostomy pouch on old G tube site with copious output.  Respiratory: Diminished in all lobes, denies SOB   Cardiac: Denies chest pain  GI/: Small loose BM this shift. Copious drainage from both G tube sites.   Nutrition: Regular, no appetite this shift.   Pain: PRN dilaudid x1, scheduled oxy    Plan of care:  Continue with POC, update treatment team as needed with changes. Thoracentesis to occur in AM per notes.

## 2021-09-19 NOTE — PROGRESS NOTES
[facility]    Progress Note - Maroon 3 Service        Date of Admission:  6/28/2021    Assessment & Plan             Dax Villareal is a 30 year old male with hx of alcohol use disorder admitted on 6/28/2021 after recent prolonged admission at [facility] in [place] who has severe acute alcoholic hepatitis c/b HE, ESRD requiring HD, and malnutrition in the setting of acute necrotizing pancreatitis, s/p PEG-J placement on 7/13. He arrived with acute respiratory failure and septic shock that have resolved, and his secondary bacterial peritonitis has been appropriately treated. He requires continued management for ESRD, hepatic encephalopathy, infected pancreatic pseudocyst, nutritional support via PEG-J, and persistent right pleural effusion. Clinically improving after multiple necrosectomies. Increasing leukocytosis and hypotension on 9/2 prompting resumption of IV antibiotics - has been stable since that time. Repeat CT with interval improvement in fluid collections.     Today:   - Thoracentesis today   - Encouraged participation in personal hygiene, including brushing his teeth    - If continued lack of involvement in personal cares, consider Psych consult  - Elevated CRP - suspect secondary to abdominal wound, repeat after closure   - GI to close the prior PEG site on Monday - NPO MD for procedure with tube feedings off and PEG to gravity drain   - Remove ostomy and replace with packed gauze prior to procedure      #Septic Shock, resolved  #Necrotizing alcoholic pancreatitis w/ infected pseudocyst, improving   #Hepatic Fluid collection, stable to improving  #Secondary Bacterial Peritonitis 2/2 infected pseudocyst with VRE, improving  Admitted to the [facility] ICU on 6/28/2021 from [facility] in [place] for septic shock due to necrotizing pancreatitis. CT on admit showing mature collection with enhancing wall measuring ~[id number]. IR placed perc drain 6/29. Repeat  imaging 7/18 revealed walled off necrotic collection amenable to endoscopic transluminal drainage with cystgastrostomy stenting with necrosectomy on 7/21/21. Complete necrosectomy on 8/11. Antibiotics (cefepime and metronidazole) were discontinued on 8/25 after left flank drain removed, re-initiated in the setting of fever on 9/2. Favor abdominal etiology, repeat CT on 9/7 with interval improvement in fluid collections - will complete 7d course of abx per ID with EOT 9/8.   - Will likely need intermittent antibiotics with fluid collections occasionally with bacterial growth moving forward as to be expected in nec panc      #G-Tube Fistula   Dax had been having increasing G-tube output and was found to have an erythematous indurated area next to his G-tube insertion. This was found to be his G-tube balloon pushing through the skin. He underwent G-tube replacement on  9/13/21. His old G-tube site has since had large volume output requiring frequent wound care dressing changes.   - GI to close the PEG site on Monday - NPO MD on Sunday for procedure with tube feedings off and PEG to gravity drain      # Hypovolemia  Patient with substantial daily NG output and poor PO. Also note significant weight loss over the past few weeks. He has needed intermittent boluses of normal saline for euvolemia. Have discussed with renal multiple times - please re-evaluate volume status vs dry weight as he is often hypotensive following fluid removal.   - Weight 78kg on 8/13. Now 55 kg. Concern for weight loss vs. targeting dry weight.  -Monitor vitals and output post dialysis, consider fluids prn.  - Consider discussing using midodrine with Nephrology      #Gastric Outlet Obstruction  High G tube output due to the cystogastric connection/fluid drainage noted on 8/5. Per GI, possibly related to gastric outlet from severe abdominal inflammation and is expected. KUB negative for obstructive gas pattern. Continues to have mild to moderate NG  output - stable to improved. Relieved with draining G tube to gravity when experiencing pain/nausea.      #Hyponatremia, stable  Patient with down-trending sodium the week of 8/27; likely due to substantial NG output and low solute intake and hepatic dysfunction. Stable 127-128.     #Right Pancreatic Hydrothorax, stable  Increased dyspnea with CXR on 7/19 with further imaging and thoracentesis confirming a hepatic/pancreatic hydrothorax. Thoracentesis PRN for dyspnea, most recently 9/3.      #Alcoholic Hepatitis  #Coagulation Defect  Received 1 week of steroids at [facility] for elevated INR and altered mental status. Not a liver transplant candidate. Mental status improved with aggressive lactulose and rifaxamin, which was discontinued without return of AMS.     ESRD due to ATN  Hyperphosphatemia  Stage III RADHA due to ATN from septic shock without recovery for >3 months. Line is TDC from [facility]. Nephrology dialyzed on Tu, Th, Sa schedule.  - Will need follow-up and outpatient dialysis  - Sevelemer powder TID with TF for hyperphosphatemia      #Anemia due to Chronic Illness  #Hematochezia, resolved  #Hemoperitoneum  Baseline hgb 17. Running between 6-8 in setting of chronic illness, new ESRD with epocrit per Nephrology, frequent lab draws and procedures. Hematochezia on 8/8 after starting standard intensity heparin, now resolved. Paracentesis on 8/21 with hemoperitoneum. No active bleeding. Required a unit of PRBC on 9/4 in the setting of several fluid boluses.   - Goal hgb > 7.0, trend Hgb  - Hgb has been stable.      #Alcohol Use Disorder  Counseled patient on complete alcohol cessation. Not interested in additional services at this time. Continue daily folic acid and thiamine.     #Lactic Acidosis  Intermittent Lactic acidosis in the setting of sepsis, ESRD, bleeding.     #Hypokalemia  Noted in the setting of high gastric output and improved once G-tube output decreased. CTM.      #Severe Malnutrition  Poor oral  intake in the setting of necrotizing pancreatitis. PEG tube placed 7/13, replaced 9/14. Dietitian consulted and continuous tube feeds with goal of 65 ml/hr with continuous pancreatic enzymes. Transitioning to renal formula.   - Continuous tube feeds 50 mL overnight.         Diet: Adult Formula Drip Feeding: Continuous Vital 1.5; Jejunostomy; Goal Rate: 60; mL/hr; Medication - Feeding Tube Flush Frequency: At least 15-30 mL water before and after medication administration and with tube clogging; Amount to Send (Nutrition us...  Regular Diet Adult  NPO per Anesthesia Guidelines for Procedure/Surgery Except for: Meds    DVT Prophylaxis: Pneumatic Compression Devices  Rdz Catheter: Not present  Fluids: None   Central Lines: PRESENT  PICC Triple Lumen 06/28/21 Left-Site Assessment: WDL  CVC Double Lumen Right-Site Assessment: WDL  Code Status: Full Code      Disposition Plan   Expected discharge: 09/20/2021   recommended to LTC facility once safe disposition plan/ TCU bed available.     The patient's care was discussed with the Attending Physician, Dr. Coon, Patient and Patient's Family.    Tri Lozada MD  [address]  Securely message with the Vocera Web Console (learn more here)  Text page via [facility] Paging/Directory  Please see sign in/sign out for up to date coverage information    Clinically Significant Risk Factors Present on Admission                ______________________________________________________________________    Interval History   Dax reports improvement in his abdominal pain today. We discussed his need to participate in his personal hygiene, especially dental cares. We discussed that since he is getting a procedure tomorrow, we need to do the thoracentesis today in order to ensure better respiratory status for the procedure. He reports that he is otherwise feeling OK.     Data reviewed today: I reviewed all medications, new labs and  imaging results over the last 24 hours. I personally reviewed no images or EKG's today.    Physical Exam   Vital Signs: Temp: (!) 96.1  F (35.6  C) Temp src: Oral BP: 102/43 Pulse: 108   Resp: 18 SpO2: 97 % O2 Device: Nasal cannula with humidification Oxygen Delivery: 3 LPM  Weight: 143 lbs 4.78 oz  General Appearance: Thin male, laying flat, no acute distress   Respiratory: Diminished throughout right lung, Clear in KALYAN and diminished in LLL   Cardiovascular: RRR, no murmurs   GI: continued bilious drainage in ostomy bag, tender to palpation, BS+   Skin: skin irritation surrounding his abdominal wound   Other: Very poor dentition     Data   Recent Labs   Lab 09/18/21  0542 09/17/21  0555 09/16/21  0533 09/13/21  1545 09/13/21  0631   WBC 10.7  --  10.7  --  11.5*   HGB 8.6*  --  8.6*  --  8.2*   MCV 97  --  98  --  96     --  290  --  266   * 128* 131*   < > 125*   POTASSIUM 3.8 4.6 4.4   < > 5.9*   CHLORIDE 95 93* 97   < > 94   CO2 23 25 27   < > 22   BUN 26 43* 30   < > 51*   CR 2.57* 3.64* 2.68*   < > 4.59*   ANIONGAP 11 10 7   < > 9   JANENE 8.9 9.4 8.7   < > 9.9   * 120* 100*   < > 84    < > = values in this interval not displayed.     No results found for this or any previous visit (from the past 24 hour(s)).  Medications     - MEDICATION INSTRUCTIONS -       dextrose       heparin (porcine) 1,000 Units/hr (08/24/21 0907)       sodium bicarbonate  325 mg Per Feeding Tube Q4H    And     amylase-lipase-protease  1-2 capsule Per Feeding Tube Q4H     amylase-lipase-protease  2 capsule Oral TID w/meals     B and C vitamin Complex with folic acid  5 mL Per Feeding Tube Daily     fiber modular (NUTRISOURCE FIBER)  1 packet Per Feeding Tube TID     folic acid  1 mg Oral Daily     heparin lock flush  5-20 mL Intracatheter Q24H     melatonin  6 mg Oral At Bedtime     menthol   Transdermal Q8H     oxyCODONE  5 mg Oral Q8H     pantoprazole (PROTONIX) IV  40 mg Intravenous Daily with breakfast     protein  modular  1 packet Per Feeding Tube Daily     sevelamer carbonate (RENVELA)  0.8 g Oral TID     vitamin B1  100 mg Oral Daily    Or     thiamine  100 mg Intravenous Daily

## 2021-09-19 NOTE — CONSULTS
Procedure Note    Attending: Ruiz Zhang MD  Procedure: Right Thoracentesis  Indication: hypoxic resp failure  Risk Assessment: low  Pre-procedure diagnosis: pleural effusion  Post-procedure diagnosis: pleural effusion    The risks and benefits of the procedure were explained to Dax Villareal who expressed understanding and opted to proceed.  Consent was obtained and placed in the chart and the patient was placed on pulse oximetry.  A time out was performed.    An ultrasound probe was used to identify an area of pleural fluid in the right hemithorax with the patient in the left lat decub position.  This area was prepped and draped in the usual sterile fashion.  4 ml of 1% lidocaine was instilled and fluid located using ultrasound guidance.  A small incision was made with an 11 blade.  The 5 fr Snugg Home thoracentesis catheter and needle were inserted above the rib until fluid obtained then the needle removed.     1500 ml of translucent monica colored fluid was removed. A specimen was sent for analysis.    The catheter was removed with the patient exhaling and an occlusive dressing placed.       Ultrasound revealed residual pleural fluid in the superior aspect of the chest thus normal lung sliding could not be assessed after the procedure. A chest radiograph is pending.    The tolerated the procedure well.  Please contact the Consult and Procedure Service if any concerns or complications arise.       Ruiz Zhang MD    DOS:  9/19/2021

## 2021-09-20 ENCOUNTER — APPOINTMENT (OUTPATIENT)
Dept: CT IMAGING | Facility: CLINIC | Age: 31
End: 2021-09-20
Attending: INTERNAL MEDICINE
Payer: MEDICAID

## 2021-09-20 ENCOUNTER — ANESTHESIA (OUTPATIENT)
Dept: SURGERY | Facility: CLINIC | Age: 31
End: 2021-09-20
Payer: MEDICAID

## 2021-09-20 ENCOUNTER — RESULTS ONLY (OUTPATIENT)
Dept: GASTROENTEROLOGY | Facility: CLINIC | Age: 31
End: 2021-09-20

## 2021-09-20 ENCOUNTER — APPOINTMENT (OUTPATIENT)
Dept: GENERAL RADIOLOGY | Facility: CLINIC | Age: 31
End: 2021-09-20
Attending: INTERNAL MEDICINE
Payer: MEDICAID

## 2021-09-20 LAB
ERYTHROCYTE [DISTWIDTH] IN BLOOD BY AUTOMATED COUNT: 15.5 % (ref 10–15)
GLUCOSE BLDC GLUCOMTR-MCNC: 107 MG/DL (ref 70–99)
GLUCOSE BLDC GLUCOMTR-MCNC: 141 MG/DL (ref 70–99)
HCT VFR BLD AUTO: 28.4 % (ref 40–53)
HGB BLD-MCNC: 9.4 G/DL (ref 13.3–17.7)
MAGNESIUM SERPL-MCNC: 2.6 MG/DL (ref 1.6–2.3)
MCH RBC QN AUTO: 31 PG (ref 26.5–33)
MCHC RBC AUTO-ENTMCNC: 33.1 G/DL (ref 31.5–36.5)
MCV RBC AUTO: 94 FL (ref 78–100)
PHOSPHATE SERPL-MCNC: 10.5 MG/DL (ref 2.5–4.5)
PLATELET # BLD AUTO: 279 10E3/UL (ref 150–450)
RBC # BLD AUTO: 3.03 10E6/UL (ref 4.4–5.9)
SARS-COV-2 RNA RESP QL NAA+PROBE: NEGATIVE
WBC # BLD AUTO: 11.3 10E3/UL (ref 4–11)

## 2021-09-20 PROCEDURE — C9113 INJ PANTOPRAZOLE SODIUM, VIA: HCPCS | Performed by: INTERNAL MEDICINE

## 2021-09-20 PROCEDURE — 99232 SBSQ HOSP IP/OBS MODERATE 35: CPT | Mod: 25 | Performed by: CLINICAL NURSE SPECIALIST

## 2021-09-20 PROCEDURE — 120N000002 HC R&B MED SURG/OB UMMC

## 2021-09-20 PROCEDURE — 250N000013 HC RX MED GY IP 250 OP 250 PS 637: Performed by: INTERNAL MEDICINE

## 2021-09-20 PROCEDURE — 250N000011 HC RX IP 250 OP 636

## 2021-09-20 PROCEDURE — 83735 ASSAY OF MAGNESIUM: CPT | Performed by: INTERNAL MEDICINE

## 2021-09-20 PROCEDURE — 250N000009 HC RX 250: Performed by: INTERNAL MEDICINE

## 2021-09-20 PROCEDURE — 272N000001 HC OR GENERAL SUPPLY STERILE: Performed by: INTERNAL MEDICINE

## 2021-09-20 PROCEDURE — 250N000025 HC SEVOFLURANE, PER MIN: Performed by: INTERNAL MEDICINE

## 2021-09-20 PROCEDURE — U0005 INFEC AGEN DETEC AMPLI PROBE: HCPCS | Performed by: INTERNAL MEDICINE

## 2021-09-20 PROCEDURE — 250N000011 HC RX IP 250 OP 636: Performed by: INTERNAL MEDICINE

## 2021-09-20 PROCEDURE — 250N000009 HC RX 250

## 2021-09-20 PROCEDURE — 90937 HEMODIALYSIS REPEATED EVAL: CPT

## 2021-09-20 PROCEDURE — 70486 CT MAXILLOFACIAL W/O DYE: CPT | Mod: 26 | Performed by: RADIOLOGY

## 2021-09-20 PROCEDURE — 360N000075 HC SURGERY LEVEL 2, PER MIN: Performed by: INTERNAL MEDICINE

## 2021-09-20 PROCEDURE — 634N000001 HC RX 634: Performed by: INTERNAL MEDICINE

## 2021-09-20 PROCEDURE — 250N000011 HC RX IP 250 OP 636: Performed by: CLINICAL NURSE SPECIALIST

## 2021-09-20 PROCEDURE — 250N000013 HC RX MED GY IP 250 OP 250 PS 637

## 2021-09-20 PROCEDURE — 258N000003 HC RX IP 258 OP 636: Performed by: INTERNAL MEDICINE

## 2021-09-20 PROCEDURE — 99232 SBSQ HOSP IP/OBS MODERATE 35: CPT | Mod: 25 | Performed by: HOSPITALIST

## 2021-09-20 PROCEDURE — 370N000017 HC ANESTHESIA TECHNICAL FEE, PER MIN: Performed by: INTERNAL MEDICINE

## 2021-09-20 PROCEDURE — 85027 COMPLETE CBC AUTOMATED: CPT

## 2021-09-20 PROCEDURE — 999N000179 XR SURGERY CARM FLUORO LESS THAN 5 MIN W STILLS

## 2021-09-20 PROCEDURE — 710N000010 HC RECOVERY PHASE 1, LEVEL 2, PER MIN: Performed by: INTERNAL MEDICINE

## 2021-09-20 PROCEDURE — 258N000003 HC RX IP 258 OP 636

## 2021-09-20 PROCEDURE — 999N000141 HC STATISTIC PRE-PROCEDURE NURSING ASSESSMENT: Performed by: INTERNAL MEDICINE

## 2021-09-20 PROCEDURE — 36592 COLLECT BLOOD FROM PICC: CPT | Performed by: INTERNAL MEDICINE

## 2021-09-20 PROCEDURE — 84100 ASSAY OF PHOSPHORUS: CPT

## 2021-09-20 PROCEDURE — 70486 CT MAXILLOFACIAL W/O DYE: CPT

## 2021-09-20 PROCEDURE — C1769 GUIDE WIRE: HCPCS | Performed by: INTERNAL MEDICINE

## 2021-09-20 RX ORDER — FENTANYL CITRATE 50 UG/ML
INJECTION, SOLUTION INTRAMUSCULAR; INTRAVENOUS PRN
Status: DISCONTINUED | OUTPATIENT
Start: 2021-09-20 | End: 2021-09-20

## 2021-09-20 RX ORDER — ONDANSETRON 2 MG/ML
INJECTION INTRAMUSCULAR; INTRAVENOUS PRN
Status: DISCONTINUED | OUTPATIENT
Start: 2021-09-20 | End: 2021-09-20

## 2021-09-20 RX ORDER — ACETAMINOPHEN 325 MG/10.15ML
325 LIQUID ORAL EVERY 4 HOURS PRN
Status: DISCONTINUED | OUTPATIENT
Start: 2021-09-20 | End: 2021-10-12 | Stop reason: HOSPADM

## 2021-09-20 RX ORDER — LANOLIN ALCOHOL/MO/W.PET/CERES
6 CREAM (GRAM) TOPICAL AT BEDTIME
Status: DISCONTINUED | OUTPATIENT
Start: 2021-09-20 | End: 2021-09-21

## 2021-09-20 RX ORDER — OXYCODONE HYDROCHLORIDE 5 MG/1
5 TABLET ORAL EVERY 4 HOURS PRN
Status: DISCONTINUED | OUTPATIENT
Start: 2021-09-20 | End: 2021-09-20 | Stop reason: HOSPADM

## 2021-09-20 RX ORDER — FOLIC ACID 1 MG/1
1 TABLET ORAL DAILY
Status: DISCONTINUED | OUTPATIENT
Start: 2021-09-21 | End: 2021-10-12 | Stop reason: HOSPADM

## 2021-09-20 RX ORDER — SEVELAMER CARBONATE FOR ORAL SUSPENSION 800 MG/1
1.6 POWDER, FOR SUSPENSION ORAL 3 TIMES DAILY
Status: DISCONTINUED | OUTPATIENT
Start: 2021-09-20 | End: 2021-09-20

## 2021-09-20 RX ORDER — LIDOCAINE HYDROCHLORIDE 20 MG/ML
INJECTION, SOLUTION INFILTRATION; PERINEURAL PRN
Status: DISCONTINUED | OUTPATIENT
Start: 2021-09-20 | End: 2021-09-20

## 2021-09-20 RX ORDER — HYDROMORPHONE HYDROCHLORIDE 1 MG/ML
1 SOLUTION ORAL EVERY 4 HOURS PRN
Status: DISCONTINUED | OUTPATIENT
Start: 2021-09-20 | End: 2021-09-29

## 2021-09-20 RX ORDER — FLUMAZENIL 0.1 MG/ML
0.2 INJECTION, SOLUTION INTRAVENOUS
Status: ACTIVE | OUTPATIENT
Start: 2021-09-20 | End: 2021-09-21

## 2021-09-20 RX ORDER — HYDROMORPHONE HYDROCHLORIDE 1 MG/ML
0.5 INJECTION, SOLUTION INTRAMUSCULAR; INTRAVENOUS; SUBCUTANEOUS ONCE
Status: COMPLETED | OUTPATIENT
Start: 2021-09-20 | End: 2021-09-20

## 2021-09-20 RX ORDER — ONDANSETRON 4 MG/1
4 TABLET, ORALLY DISINTEGRATING ORAL EVERY 30 MIN PRN
Status: DISCONTINUED | OUTPATIENT
Start: 2021-09-20 | End: 2021-09-20 | Stop reason: HOSPADM

## 2021-09-20 RX ORDER — OXYCODONE HCL 5 MG/5 ML
5 SOLUTION, ORAL ORAL EVERY 8 HOURS
Status: DISCONTINUED | OUTPATIENT
Start: 2021-09-20 | End: 2021-10-01

## 2021-09-20 RX ORDER — FENTANYL CITRATE 50 UG/ML
25 INJECTION, SOLUTION INTRAMUSCULAR; INTRAVENOUS EVERY 5 MIN PRN
Status: DISCONTINUED | OUTPATIENT
Start: 2021-09-20 | End: 2021-09-20 | Stop reason: HOSPADM

## 2021-09-20 RX ORDER — THIAMINE HYDROCHLORIDE 100 MG/ML
100 INJECTION, SOLUTION INTRAMUSCULAR; INTRAVENOUS DAILY
Status: DISCONTINUED | OUTPATIENT
Start: 2021-09-21 | End: 2021-10-03

## 2021-09-20 RX ORDER — SODIUM CHLORIDE, SODIUM LACTATE, POTASSIUM CHLORIDE, CALCIUM CHLORIDE 600; 310; 30; 20 MG/100ML; MG/100ML; MG/100ML; MG/100ML
INJECTION, SOLUTION INTRAVENOUS CONTINUOUS
Status: DISCONTINUED | OUTPATIENT
Start: 2021-09-20 | End: 2021-09-20 | Stop reason: HOSPADM

## 2021-09-20 RX ORDER — DEXAMETHASONE SODIUM PHOSPHATE 4 MG/ML
INJECTION, SOLUTION INTRA-ARTICULAR; INTRALESIONAL; INTRAMUSCULAR; INTRAVENOUS; SOFT TISSUE PRN
Status: DISCONTINUED | OUTPATIENT
Start: 2021-09-20 | End: 2021-09-20

## 2021-09-20 RX ORDER — SODIUM CHLORIDE, SODIUM LACTATE, POTASSIUM CHLORIDE, CALCIUM CHLORIDE 600; 310; 30; 20 MG/100ML; MG/100ML; MG/100ML; MG/100ML
INJECTION, SOLUTION INTRAVENOUS CONTINUOUS PRN
Status: DISCONTINUED | OUTPATIENT
Start: 2021-09-20 | End: 2021-09-20

## 2021-09-20 RX ORDER — HYDROMORPHONE HCL IN WATER/PF 6 MG/30 ML
0.2 PATIENT CONTROLLED ANALGESIA SYRINGE INTRAVENOUS EVERY 5 MIN PRN
Status: DISCONTINUED | OUTPATIENT
Start: 2021-09-20 | End: 2021-09-20 | Stop reason: HOSPADM

## 2021-09-20 RX ORDER — CEFAZOLIN SODIUM 1 G/3ML
INJECTION, POWDER, FOR SOLUTION INTRAMUSCULAR; INTRAVENOUS PRN
Status: DISCONTINUED | OUTPATIENT
Start: 2021-09-20 | End: 2021-09-20

## 2021-09-20 RX ORDER — LANOLIN ALCOHOL/MO/W.PET/CERES
100 CREAM (GRAM) TOPICAL DAILY
Status: DISCONTINUED | OUTPATIENT
Start: 2021-09-21 | End: 2021-10-12 | Stop reason: HOSPADM

## 2021-09-20 RX ORDER — PROPOFOL 10 MG/ML
INJECTION, EMULSION INTRAVENOUS PRN
Status: DISCONTINUED | OUTPATIENT
Start: 2021-09-20 | End: 2021-09-20

## 2021-09-20 RX ORDER — LIDOCAINE 40 MG/G
CREAM TOPICAL
Status: DISCONTINUED | OUTPATIENT
Start: 2021-09-20 | End: 2021-09-20 | Stop reason: HOSPADM

## 2021-09-20 RX ORDER — HYDROXYZINE HYDROCHLORIDE 25 MG/1
25-50 TABLET, FILM COATED ORAL 3 TIMES DAILY PRN
Status: DISCONTINUED | OUTPATIENT
Start: 2021-09-20 | End: 2021-10-12 | Stop reason: HOSPADM

## 2021-09-20 RX ORDER — SEVELAMER CARBONATE FOR ORAL SUSPENSION 800 MG/1
1.6 POWDER, FOR SUSPENSION ORAL 3 TIMES DAILY
Status: DISCONTINUED | OUTPATIENT
Start: 2021-09-20 | End: 2021-10-12 | Stop reason: HOSPADM

## 2021-09-20 RX ORDER — ONDANSETRON 2 MG/ML
4 INJECTION INTRAMUSCULAR; INTRAVENOUS EVERY 30 MIN PRN
Status: DISCONTINUED | OUTPATIENT
Start: 2021-09-20 | End: 2021-09-20 | Stop reason: HOSPADM

## 2021-09-20 RX ADMIN — EPOETIN ALFA-EPBX 4000 UNITS: 10000 INJECTION, SOLUTION INTRAVENOUS; SUBCUTANEOUS at 15:44

## 2021-09-20 RX ADMIN — LIDOCAINE HYDROCHLORIDE 100 MG: 20 INJECTION, SOLUTION INFILTRATION; PERINEURAL at 11:30

## 2021-09-20 RX ADMIN — FENTANYL CITRATE 50 MCG: 50 INJECTION, SOLUTION INTRAMUSCULAR; INTRAVENOUS at 11:28

## 2021-09-20 RX ADMIN — CEFAZOLIN 2 G: 1 INJECTION, POWDER, FOR SOLUTION INTRAMUSCULAR; INTRAVENOUS at 11:35

## 2021-09-20 RX ADMIN — FENTANYL CITRATE 25 MCG: 50 INJECTION, SOLUTION INTRAMUSCULAR; INTRAVENOUS at 13:16

## 2021-09-20 RX ADMIN — Medication 15 ML: at 21:15

## 2021-09-20 RX ADMIN — PHENYLEPHRINE HYDROCHLORIDE 100 MCG: 10 INJECTION INTRAVENOUS at 11:59

## 2021-09-20 RX ADMIN — PHENYLEPHRINE HYDROCHLORIDE 100 MCG: 10 INJECTION INTRAVENOUS at 12:12

## 2021-09-20 RX ADMIN — HYDROMORPHONE HYDROCHLORIDE 1 MG: 1 SOLUTION ORAL at 00:28

## 2021-09-20 RX ADMIN — SODIUM CHLORIDE 100 ML: 9 INJECTION, SOLUTION INTRAVENOUS at 17:06

## 2021-09-20 RX ADMIN — FOLIC ACID 1 MG: 1 TABLET ORAL at 08:53

## 2021-09-20 RX ADMIN — ONDANSETRON 4 MG: 2 INJECTION INTRAMUSCULAR; INTRAVENOUS at 10:10

## 2021-09-20 RX ADMIN — Medication 1 PACKET: at 21:01

## 2021-09-20 RX ADMIN — Medication 1 PACKET: at 08:51

## 2021-09-20 RX ADMIN — PHENYLEPHRINE HYDROCHLORIDE 200 MCG: 10 INJECTION INTRAVENOUS at 12:06

## 2021-09-20 RX ADMIN — HYDROMORPHONE HYDROCHLORIDE 0.5 MG: 1 INJECTION, SOLUTION INTRAMUSCULAR; INTRAVENOUS; SUBCUTANEOUS at 15:28

## 2021-09-20 RX ADMIN — PANCRELIPASE 2 CAPSULE: 24000; 76000; 120000 CAPSULE, DELAYED RELEASE PELLETS ORAL at 21:06

## 2021-09-20 RX ADMIN — PHENYLEPHRINE HYDROCHLORIDE 200 MCG: 10 INJECTION INTRAVENOUS at 11:36

## 2021-09-20 RX ADMIN — HYDROMORPHONE HYDROCHLORIDE 0.2 MG: 1 INJECTION, SOLUTION INTRAMUSCULAR; INTRAVENOUS; SUBCUTANEOUS at 13:42

## 2021-09-20 RX ADMIN — SUGAMMADEX 150 MG: 100 INJECTION, SOLUTION INTRAVENOUS at 12:27

## 2021-09-20 RX ADMIN — FENTANYL CITRATE 50 MCG: 50 INJECTION, SOLUTION INTRAMUSCULAR; INTRAVENOUS at 12:28

## 2021-09-20 RX ADMIN — FENTANYL CITRATE 25 MCG: 50 INJECTION, SOLUTION INTRAMUSCULAR; INTRAVENOUS at 13:38

## 2021-09-20 RX ADMIN — ONDANSETRON 4 MG: 2 INJECTION INTRAMUSCULAR; INTRAVENOUS at 12:07

## 2021-09-20 RX ADMIN — PANTOPRAZOLE SODIUM 40 MG: 40 INJECTION, POWDER, FOR SOLUTION INTRAVENOUS at 08:54

## 2021-09-20 RX ADMIN — SEVELAMER CARBONATE 1.6 G: 800 POWDER, FOR SUSPENSION ORAL at 21:04

## 2021-09-20 RX ADMIN — ONDANSETRON 4 MG: 2 INJECTION INTRAMUSCULAR; INTRAVENOUS at 21:14

## 2021-09-20 RX ADMIN — PHENYLEPHRINE HYDROCHLORIDE 200 MCG: 10 INJECTION INTRAVENOUS at 11:55

## 2021-09-20 RX ADMIN — PHENYLEPHRINE HYDROCHLORIDE 200 MCG: 10 INJECTION INTRAVENOUS at 11:50

## 2021-09-20 RX ADMIN — FENTANYL CITRATE 25 MCG: 50 INJECTION, SOLUTION INTRAMUSCULAR; INTRAVENOUS at 12:51

## 2021-09-20 RX ADMIN — Medication 5 ML: at 05:33

## 2021-09-20 RX ADMIN — DEXAMETHASONE SODIUM PHOSPHATE 4 MG: 4 INJECTION, SOLUTION INTRA-ARTICULAR; INTRALESIONAL; INTRAMUSCULAR; INTRAVENOUS; SOFT TISSUE at 11:56

## 2021-09-20 RX ADMIN — FENTANYL CITRATE 25 MCG: 50 INJECTION, SOLUTION INTRAMUSCULAR; INTRAVENOUS at 13:09

## 2021-09-20 RX ADMIN — PHENYLEPHRINE HYDROCHLORIDE 200 MCG: 10 INJECTION INTRAVENOUS at 11:30

## 2021-09-20 RX ADMIN — PHENYLEPHRINE HYDROCHLORIDE 100 MCG: 10 INJECTION INTRAVENOUS at 12:03

## 2021-09-20 RX ADMIN — THIAMINE HCL TAB 100 MG 100 MG: 100 TAB at 08:54

## 2021-09-20 RX ADMIN — FENTANYL CITRATE 25 MCG: 50 INJECTION, SOLUTION INTRAMUSCULAR; INTRAVENOUS at 12:59

## 2021-09-20 RX ADMIN — Medication: at 14:50

## 2021-09-20 RX ADMIN — PHENYLEPHRINE HYDROCHLORIDE 200 MCG: 10 INJECTION INTRAVENOUS at 11:48

## 2021-09-20 RX ADMIN — OXYCODONE HYDROCHLORIDE 5 MG: 5 SOLUTION ORAL at 04:05

## 2021-09-20 RX ADMIN — HYDROMORPHONE HYDROCHLORIDE 1 MG: 1 SOLUTION ORAL at 08:11

## 2021-09-20 RX ADMIN — PROPOFOL 100 MG: 10 INJECTION, EMULSION INTRAVENOUS at 11:30

## 2021-09-20 RX ADMIN — HYDROXYZINE HYDROCHLORIDE 50 MG: 25 TABLET, FILM COATED ORAL at 10:10

## 2021-09-20 RX ADMIN — SODIUM CHLORIDE 300 ML: 9 INJECTION, SOLUTION INTRAVENOUS at 14:49

## 2021-09-20 RX ADMIN — SODIUM BICARBONATE 325 MG: 325 TABLET ORAL at 21:06

## 2021-09-20 RX ADMIN — PHENYLEPHRINE HYDROCHLORIDE 100 MCG: 10 INJECTION INTRAVENOUS at 11:33

## 2021-09-20 RX ADMIN — HYDROMORPHONE HYDROCHLORIDE 1 MG: 1 SOLUTION ORAL at 19:22

## 2021-09-20 RX ADMIN — PHENYLEPHRINE HYDROCHLORIDE 100 MCG: 10 INJECTION INTRAVENOUS at 11:39

## 2021-09-20 RX ADMIN — Medication 5 ML: at 08:52

## 2021-09-20 RX ADMIN — HYDROMORPHONE HYDROCHLORIDE 0.2 MG: 1 INJECTION, SOLUTION INTRAMUSCULAR; INTRAVENOUS; SUBCUTANEOUS at 13:51

## 2021-09-20 RX ADMIN — PHENYLEPHRINE HYDROCHLORIDE 200 MCG: 10 INJECTION INTRAVENOUS at 11:43

## 2021-09-20 RX ADMIN — FENTANYL CITRATE 50 MCG: 50 INJECTION, SOLUTION INTRAMUSCULAR; INTRAVENOUS at 12:44

## 2021-09-20 RX ADMIN — PHENYLEPHRINE HYDROCHLORIDE 0.5 MCG/KG/MIN: 10 INJECTION INTRAVENOUS at 11:50

## 2021-09-20 RX ADMIN — ROCURONIUM BROMIDE 50 MG: 10 INJECTION INTRAVENOUS at 11:30

## 2021-09-20 RX ADMIN — SODIUM CHLORIDE 250 ML: 9 INJECTION, SOLUTION INTRAVENOUS at 14:49

## 2021-09-20 RX ADMIN — FENTANYL CITRATE 25 MCG: 50 INJECTION, SOLUTION INTRAMUSCULAR; INTRAVENOUS at 13:29

## 2021-09-20 RX ADMIN — OXYCODONE HYDROCHLORIDE 5 MG: 5 SOLUTION ORAL at 20:57

## 2021-09-20 RX ADMIN — MELATONIN TAB 3 MG 6 MG: 3 TAB at 00:17

## 2021-09-20 RX ADMIN — SODIUM CHLORIDE, POTASSIUM CHLORIDE, SODIUM LACTATE AND CALCIUM CHLORIDE: 600; 310; 30; 20 INJECTION, SOLUTION INTRAVENOUS at 11:15

## 2021-09-20 ASSESSMENT — MIFFLIN-ST. JEOR: SCORE: 1562.75

## 2021-09-20 ASSESSMENT — ACTIVITIES OF DAILY LIVING (ADL)
ADLS_ACUITY_SCORE: 18
ADLS_ACUITY_SCORE: 16

## 2021-09-20 NOTE — PROVIDER NOTIFICATION
Low 5B K.R 36-1 FYI noted elevated phos today at 10.5. Not sure if something need to be changed in his tube feedings. Mimi RIVAS [id number]    Response: Pending.

## 2021-09-20 NOTE — PROGRESS NOTES
Nephrology Progress Note  09/20/2021         Dax Villareal is a 30 year old male with h/o ETOH hepatitis, end stage liver disease, RADHA on HD, transferred from [facility] in [place] for septic shock on 6/28/21 for treatment of necrotizing pancreatitis and decompensated liver disease. He was initially admitted to [facility] 5/19/21 and started on RRT 5/26/2021.        Interval History :   Mr Villareal had necrosectomy for 4th time on 8/11 with no more anticipated.  Running HD on schedule today, has issues with chronic hyponatremia and is hyperphosphatemic today at 10.5 (after being 5.5 last check) which likely represents intake as he drinks soda frequently.  I doubled his sevelamer and he is running HD today.       Assessment & Recommendations:   ESRD-Cr was 0.8 as recently as 5/19/2021 but now HD dependent for 3 months, started RRT at [facility] prior to transfer to [facility] on 5/26.  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  continuing restorative cares for now, BP's have been stable enough to support HD.  Had GI stenting of pancreas x4.                 -Line is TDC from [facility]               -Planning HD today, 1L of UF.                 -CXR to evaluate increased O2 needs.       Volume status-Anuric, wt at low for his course, not pulling much UF as drain outputs are significant but ordered for 1L of UF today.       Electrolytes/pH-Na chronically low chronically, at 126 today.  K 4.2 and bicarb 23.       Ca/phos/pth-Ca 9.6, Mg 2.4, Phos very high at ~10 today which is likely due to his PO intake.  Increasing Sevelamer, is on Ca carbonate as well and should come down with HD today.                     Anemia-Hgb low at 9.4, acute management per team, last PRBC's 7/14.  Started on EPO 5k with runs, iron sats 43%      Nutrition-Taking PO, appetite poor lately, has been NPO due to G tube placement yesterday, would change to renal formula to limit K and  "Phos.       Seen and discussed with Dr Ahuja      Recommendations were communicated to primary team via note       SOFIA Marlow CNS  Clinical Nurse Specialist  [phone]    Review of Systems:   I reviewed the following systems:  Gen: No fevers or chills  CV: No CP at rest  Resp: No SOB at rest  GI: No N/V    Physical Exam:   I/O last 3 completed shifts:  In: 950 [P.O.:200; I.V.:30]  Out: 2225 [Emesis/NG output:775; Drains:1450]   BP 95/48 (BP Location: Right arm)   Pulse 108   Temp 97.1  F (36.2  C) (Oral)   Resp 16   Ht 1.676 m (5' 6\")   Wt 66.5 kg (146 lb 9.7 oz)   SpO2 96%   BMI 23.66 kg/m       GENERAL APPEARANCE: Lying in bed, in no distress.    EYES:  scleral icterus, pupils equal  Pulmonary: decreased BS   CV: tachy   - Edema - no LE edema  GI: mild distention, non-tender. + tenderness at G-tube site and generalized abdominal pain.    MS: no evidence of inflammation in joints, no muscle tenderness  SKIN: no rash, warm, dry, no cyanosis, + G tube erythremia   NEURO: alert/interactive  ACCESS: Right TDC with no induration or erythema    Labs:   All labs reviewed by me  Electrolytes/Renal - Recent Labs   Lab Test 09/20/21  0537 09/19/21  0719 09/18/21  0542 09/17/21  0555 09/17/21  0555 09/16/21  0533 09/16/21  0533   NA  --  126* 129*  --  128*   < > 131*   POTASSIUM  --  4.2 3.8  --  4.6   < > 4.4   CHLORIDE  --  87* 95  --  93*   < > 97   CO2  --  23 23  --  25   < > 27   BUN  --  51* 26  --  43*   < > 30   CR  --  3.85* 2.57*  --  3.64*   < > 2.68*   GLC  --  114* 127*  --  120*   < > 100*   JANENE  --  9.6 8.9  --  9.4   < > 8.7   MAG 2.6* 2.4* 2.0   < > 2.2   < > 2.0   PHOS 10.5*  --  6.2*  --   --   --  5.5*    < > = values in this interval not displayed.       CBC -   Recent Labs   Lab Test 09/20/21  0537 09/18/21  0542 09/16/21  0533   WBC 11.3* 10.7 10.7   HGB 9.4* 8.6* 8.6*    264 290       LFTs -   Recent Labs   Lab Test 09/04/21  0414 09/03/21  0523 09/02/21  1730   ALKPHOS 81 " 95 120   BILITOTAL 1.4* 2.0* 2.2*   ALT 29 31 40   AST 38 39 57*   PROTTOTAL 8.8 9.2* 10.7*   ALBUMIN 2.9* 2.3* 2.7*       Iron Panel -   Recent Labs   Lab Test 08/14/21 2055 07/19/21  0632   IRON 28* 31*   IRONSAT 21 43   JERRELL 2,186*  --            Current Medications:    sodium chloride 0.9%  250 mL Intravenous Once in dialysis/CRRT     sodium chloride 0.9%  300 mL Hemodialysis Machine Once     sodium bicarbonate  325 mg Per Feeding Tube Q4H    And     amylase-lipase-protease  1-2 capsule Per Feeding Tube Q4H     amylase-lipase-protease  2 capsule Oral TID w/meals     B and C vitamin Complex with folic acid  5 mL Per Feeding Tube Daily     epoetin charles-epbx (RETACRIT) inj ESRD  4,000 Units Intravenous Once in dialysis/CRRT     fiber modular (NUTRISOURCE FIBER)  1 packet Per Feeding Tube TID     folic acid  1 mg Oral Daily     heparin lock flush  5-20 mL Intracatheter Q24H     melatonin  6 mg Oral At Bedtime     menthol   Transdermal Q8H     - MEDICATION INSTRUCTIONS -   Does not apply Once     oxyCODONE  5 mg Oral Q8H     pantoprazole (PROTONIX) IV  40 mg Intravenous Daily with breakfast     protein modular  1 packet Per Feeding Tube Daily     sevelamer carbonate (RENVELA)  0.8 g Oral TID     sodium chloride (PF)  3 mL Intracatheter Q8H     sodium chloride (PF)  3 mL Intracatheter Q8H     sodium chloride (PF)  9 mL Intracatheter During Dialysis/CRRT (from stock)     sodium chloride (PF)  9 mL Intracatheter During Dialysis/CRRT (from stock)     vitamin B1  100 mg Oral Daily    Or     thiamine  100 mg Intravenous Daily       - MEDICATION INSTRUCTIONS -       dextrose       heparin (porcine) 1,000 Units/hr (08/24/21 0907)

## 2021-09-20 NOTE — ANESTHESIA POSTPROCEDURE EVALUATION
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    Anesthesia Type:  General    Note:  Disposition: Inpatient   Postop Pain Control: Uneventful            Sign Out: Well controlled pain   PONV: No   Neuro/Psych: Uneventful            Sign Out: Acceptable/Baseline neuro status   Airway/Respiratory: Uneventful            Sign Out: Acceptable/Baseline resp. status   CV/Hemodynamics: Uneventful            Sign Out: Acceptable CV status; No obvious hypovolemia; No obvious fluid overload   Other NRE: NONE   DID A NON-ROUTINE EVENT OCCUR? No           Last vitals:  Vitals Value Taken Time   /55 09/20/21 1350   Temp 36.1  C (97  F) 09/20/21 1345   Pulse 105 09/20/21 1356   Resp 15 09/20/21 1345   SpO2 94 % 09/20/21 1356   Vitals shown include unvalidated device data.    Electronically Signed By: Albin Phillips MD  September 20, 2021  2:03 PM

## 2021-09-20 NOTE — ANESTHESIA PROCEDURE NOTES
Airway       Patient location during procedure: OR       Procedure Start/Stop Times: 9/20/2021 11:33 AM  Staff -        Anesthesiologist:  Albin Phillips MD       Resident/Fellow: Hebert Vega       Performed By: residentIndications and Patient Condition       Indications for airway management: gera-procedural         Mask difficulty assessment: 1 - vent by mask    Final Airway Details       Final airway type: endotracheal airway       Successful airway: ETT - single  Endotracheal Airway Details        ETT size (mm): 7.5       Cuffed: yes       Successful intubation technique: direct laryngoscopy       DL Blade Type: MAC 4       Grade View of Cords: 1       Adjucts: stylet       Position: Right       Measured from: gums/teeth       Secured at (cm): 23       Bite block used: None    Post intubation assessment        Placement verified by: capnometry, equal breath sounds and chest rise        Number of attempts at approach: 1       Secured with: pink tape       Ease of procedure: easy       Dentition: Intact and Unchanged

## 2021-09-20 NOTE — BRIEF OP NOTE
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 More recently he has evidence of burried gastrostomy bumper, underwent EGD with tube removal, overcope clip placement to old fistulous tract and new GJ placement. Fistula site remains open. He now proceeds to upper endoscopy for fistula closure.    EGD findings:  - Gastric exam showed G tube with balloon inflated, adjacent fistulous tract and OTSC not completely traversing the tract.  - APC applied from inside and brushing from outside the tract for de-epithelization  - Successful closure of gastrocutaneous fistula using suture gastropexy.       Plan:  - NPO for 1 week  - G tube to LIS for 1 week  - Continue TF via jejunostomy tube   - Monitor fistula site, apply daily dressing and notify service with any recurrent output.

## 2021-09-20 NOTE — CONSULTS
Patient was seen in dialysis, but too sedated to interview.   In reviewing the chart and discussing with the primary team, he may benefit from Ritalin 5 mg morning and at 1300.   I will finish seeing him tomorrow.  Juan Carlos Mckeon M.D.   [facility]   Contact information available via [facility] Paging/Directory  If I am not available, then [facility] CL line ([phone]) should know who   Is on call

## 2021-09-20 NOTE — ANESTHESIA CARE TRANSFER NOTE
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    Anesthesia Type:   General     Note:    Oropharynx: spontaneously breathing and oropharynx clear of all foreign objects  Level of Consciousness: awake  Oxygen Supplementation: face mask  Level of Supplemental Oxygen (L/min / FiO2): 6-8  Independent Airway: airway patency satisfactory and stable  Dentition: dentition unchanged  Vital Signs Stable: post-procedure vital signs reviewed and stable  Report to RN Given: handoff report given  Patient transferred to: PACU  Comments: Patient with ongoing pain upon arrival to PACU. Remaining 25 mcg given to patient at time of sign-out.   Handoff Report: Identifed the Patient, Identified the Reponsible Provider, Reviewed the pertinent medical history, Discussed the surgical course, Reviewed Intra-OP anesthesia mangement and issues during anesthesia, Set expectations for post-procedure period and Allowed opportunity for questions and acknowledgement of understanding      Vitals:  Vitals Value Taken Time   BP 94/68 09/20/21 1300   Temp 36.1  C (97  F) 09/20/21 1255   Pulse 112 09/20/21 1305   Resp 18 09/20/21 1300   SpO2 96 % 09/20/21 1305   Vitals shown include unvalidated device data.    Electronically Signed By: Hebert Vega  September 20, 2021  1:07 PM

## 2021-09-20 NOTE — PROGRESS NOTES
HEMODIALYSIS TREATMENT NOTE    Date: 9/20/2021  Time: 6:07 PM    Data:  Pre Wt: 66.5 kg (146 lb 9.7 oz)   Desired Wt: 65.5 kg   Ultrafiltration - Post Run Net Total Removed (mL): 700 mL  Vascular Access Status: RIJ Tunneled CVC Dual lines:  patent  Dialyzer Rinse: Streaked, Light  Total Blood Volume Processed: 65 L   Total Dialysis (Treatment) Time: 3.5 hrs  Dialysate Bath: K 3, Ca 2.25, Na bath :138, Bicarb: 32  Heparin: None    Lab:   No    Assessment:  Patent RIJ Tunneled CVC Dual lines.  Pt came from PACU after surgery.    Interventions:  Patient dialyzed for 3 hrs 30 mins via RIJ Tunneled CVC Dual lines. Initiated HD with 1 kg off and reached BFR to 350 ml/mins.Gave Epogen 4,000 units IV during run. Not tolerable for 1 kg off d/t soft BP 80/43 mmHg, MUF on and gave NS bolus 100 ml IV for pressure support. Then matched UF goal down to 0.7 kg off. Finished HD with rinse back. CVC NS locked , applied clear guards caps.     Plan:    Next run per renal team.

## 2021-09-20 NOTE — PLAN OF CARE
[id number] A/O x4. Declined site care to current peg tube. Pain medication given x2. One episode of 200 mL emesis; zofran given. G tube to gravity. Declined complete skin assessment. Right lower lobes diminished. Breathing shallow; denied SOB. Continues to be tachycardic. OR today for fistula closure. Went to dialysis from PACU. Was NPO during shift.

## 2021-09-20 NOTE — PROGRESS NOTES
[facility]    Progress Note - Daniela 3 Service        Date of Admission:  6/28/2021    Assessment & Plan             Dax Villareal is a 30 year old male with hx of alcohol use disorder admitted on 6/28/2021 after recent prolonged admission at [facility] in [place] who has severe acute alcoholic hepatitis c/b HE, ESRD requiring HD, and malnutrition in the setting of acute necrotizing pancreatitis, s/p PEG-J placement on 7/13. He arrived with acute respiratory failure and septic shock that have resolved, and his secondary bacterial peritonitis has been appropriately treated. He requires continued management for ESRD, hepatic encephalopathy, infected pancreatic pseudocyst, nutritional support via PEG-J, and persistent right pleural effusion. Clinically improving after multiple necrosectomies. Increasing leukocytosis and hypotension on 9/2 prompting resumption of IV antibiotics - has been stable since that time. Repeat CT with interval improvement in fluid collections.     Today:   - Thoracentesis yesterday without signs of infection, 1500mLs removed   - Encouraged participation in personal hygiene, including brushing his teeth    - Psych consulted. Will evaluate tomorrow. Recommended 5mg Ritalin AM/Noon  - Elevated CRP - suspect secondary to abdominal wound, repeat after closure   - GI - EGD to close fistula site today   - HD today      #Septic Shock, resolved  #Necrotizing alcoholic pancreatitis w/ infected pseudocyst, improving   #Hepatic Fluid collection, stable to improving  #Secondary Bacterial Peritonitis 2/2 infected pseudocyst with VRE, improving  Admitted to the [facility] ICU on 6/28/2021 from [facility] in [place] for septic shock due to necrotizing pancreatitis. CT on admit showing mature collection with enhancing wall measuring ~[id number]. IR placed perc drain 6/29. Repeat imaging 7/18 revealed walled off necrotic collection amenable to endoscopic  transluminal drainage with cystgastrostomy stenting with necrosectomy on 7/21/21. Complete necrosectomy on 8/11. Antibiotics (cefepime and metronidazole) were discontinued on 8/25 after left flank drain removed, re-initiated in the setting of fever on 9/2. Favor abdominal etiology, repeat CT on 9/7 with interval improvement in fluid collections - will complete 7d course of abx per ID with EOT 9/8.   - Will likely need intermittent antibiotics with fluid collections occasionally with bacterial growth moving forward as to be expected in nec panc      #G-Tube Fistula   Dax had been having increasing G-tube output and was found to have an erythematous indurated area next to his G-tube insertion. This was found to be his G-tube balloon pushing through the skin. He underwent G-tube replacement on  9/13/21. His old G-tube site has since had large volume output requiring frequent wound care dressing changes.   - 9/20/21 - EGD to close fistula completed by GI      # Hypovolemia  Patient with substantial daily NG output and poor PO. Also note significant weight loss over the past few weeks. He has needed intermittent boluses of normal saline for euvolemia. Have discussed with renal multiple times - please re-evaluate volume status vs dry weight as he is often hypotensive following fluid removal.   - Weight 78kg on 8/13. Now 55 kg. Concern for weight loss vs. targeting dry weight.  -Monitor vitals and output post dialysis, consider fluids prn.  - Consider discussing using midodrine with Nephrology      #Gastric Outlet Obstruction  High G tube output due to the cystogastric connection/fluid drainage noted on 8/5. Per GI, possibly related to gastric outlet from severe abdominal inflammation and is expected. KUB negative for obstructive gas pattern. Continues to have mild to moderate NG output - stable to improved. Relieved with draining G tube to gravity when experiencing pain/nausea.      #Hyponatremia, stable  Patient with  down-trending sodium the week of 8/27; likely due to substantial NG output and low solute intake and hepatic dysfunction. Stable 127-128.     #Right Pancreatic Hydrothorax, stable  Increased dyspnea with CXR on 7/19 with further imaging and thoracentesis confirming a hepatic/pancreatic hydrothorax. Thoracentesis PRN for dyspnea, most recently 9/3.      #Alcoholic Hepatitis  #Coagulation Defect  Received 1 week of steroids at [facility] for elevated INR and altered mental status. Not a liver transplant candidate. Mental status improved with aggressive lactulose and rifaxamin, which was discontinued without return of AMS.     ESRD due to ATN  Hyperphosphatemia  Stage III RADHA due to ATN from septic shock without recovery for >3 months. Line is TDC from \[facility]\"". Nephrology dialyzed on Tu, Th, Sa schedule.  - Will need follow-up and outpatient dialysis  - Sevelemer powder TID with TF for hyperphosphatemia      #Anemia due to Chronic Illness  #Hematochezia, resolved  #Hemoperitoneum  Baseline hgb 17. Running between 6-8 in setting of chronic illness, new ESRD with epocrit per Nephrology, frequent lab draws and procedures. Hematochezia on 8/8 after starting standard intensity heparin, now resolved. Paracentesis on 8/21 with hemoperitoneum. No active bleeding. Required a unit of PRBC on 9/4 in the setting of several fluid boluses.   - Goal hgb > 7.0, trend Hgb  - Hgb has been stable.      #Alcohol Use Disorder  Counseled patient on complete alcohol cessation. Not interested in additional services at this time. Continue daily folic acid and thiamine.     #Lactic Acidosis  Intermittent Lactic acidosis in the setting of sepsis, ESRD, bleeding.     #Hypokalemia  Noted in the setting of high gastric output and improved once G-tube output decreased. CTM.      #Severe Malnutrition  Poor oral intake in the setting of necrotizing pancreatitis. PEG tube placed 7/13, replaced 9/14. Dietitian consulted and continuous tube feeds with goal  of 65 ml/hr with continuous pancreatic enzymes. Transitioning to renal formula.   - Continuous tube feeds 50 mL overnight.           Diet: Adult Formula Drip Feeding: Continuous Vital 1.5; Jejunostomy; Goal Rate: 60; mL/hr; Medication - Feeding Tube Flush Frequency: At least 15-30 mL water before and after medication administration and with tube clogging; Amount to Send (Nutrition us...  NPO per Anesthesia Guidelines for Procedure/Surgery Except for: Meds    DVT Prophylaxis: Pneumatic Compression Devices  Rdz Catheter: Not present  Fluids: none  Central Lines: PRESENT  PICC Triple Lumen 06/28/21 Left-Site Assessment: WDL  CVC Double Lumen Right-Site Assessment: WDL  Code Status: Full Code      Disposition Plan   Expected discharge: 09/24/2021   recommended to long term care facility once safe disposition plan/ TCU bed available and PEG site closed and inflammation markers down.     The patient's care was discussed with the Attending Physician, Dr. Coon, Bedside Nurse, Patient and Patient's Family.    Tri Lozada MD  [address]  Securely message with the Vocera Web Console (learn more here)  Text page via [facility] Paging/Directory  Please see sign in/sign out for up to date coverage information    Clinically Significant Risk Factors Present on Admission                ______________________________________________________________________    Interval History   Dax reports that he is feeling tired this AM and reports some back pain. He reports that his abdominal pain is improved. He understands that he will be going for a procedure today. He reports improved breathing after the thoracentesis. I explained that he needs a dental consult as his lack of participation in personal cares has led to poor dentition which is a risk for infection. He was agreeable. I also asked if it would be helpful for him to talk with psychiatry as he is not participating in  cares and is dealing with a prolonged hospitalization and multiple comorbidities. He was agreeable to do so.     Data reviewed today: I reviewed all medications, new labs and imaging results over the last 24 hours. I personally reviewed the chest x-ray image(s) showing decreased plerual effusion without pneumo .    Physical Exam   Vital Signs: Temp: 97.1  F (36.2  C) Temp src: Oral BP: 95/48 Pulse: 108   Resp: 16 SpO2: 96 % O2 Device: Nasal cannula with humidification Oxygen Delivery: 3 LPM  Weight: 146 lbs 9.69 oz  General Appearance: Sleeping but easily arousable, alert and interactive   Respiratory: diminished in RLL, CTAB in left lung   Cardiovascular: RRR  GI: BS+, soft, tender to palpation, ostomy in place with small bilious output  Skin: some irritation surrounding prior PEG site   Other: Muscle atrophy of all four extremities with LE deformities      Data   Recent Labs   Lab 09/20/21  0537 09/19/21  1247 09/19/21  0719 09/18/21  0542 09/17/21  0555 09/16/21  0533 09/16/21  0533   WBC 11.3*  --   --  10.7  --   --  10.7   HGB 9.4*  --   --  8.6*  --   --  8.6*   MCV 94  --   --  97  --   --  98     --   --  264  --   --  290   INR  --  1.51*  --   --   --   --   --    NA  --   --  126* 129* 128*   < > 131*   POTASSIUM  --   --  4.2 3.8 4.6   < > 4.4   CHLORIDE  --   --  87* 95 93*   < > 97   CO2  --   --  23 23 25   < > 27   BUN  --   --  51* 26 43*   < > 30   CR  --   --  3.85* 2.57* 3.64*   < > 2.68*   ANIONGAP  --   --  16* 11 10   < > 7   JANENE  --   --  9.6 8.9 9.4   < > 8.7   GLC  --   --  114* 127* 120*   < > 100*    < > = values in this interval not displayed.     Recent Results (from the past 24 hour(s))   POC US Guide for Thorcentesis    Impression    Large simple appearing pleural effusion on the right. 1500 mls removed. See consult note for details   XR Chest Port 1 View    Narrative    EXAMINATION: XR CHEST PORT 1 VIEW, 9/19/2021 5:01 PM    COMPARISON: 9/16/2020 oh    HISTORY: follow-up on  right-sided thoracentesis    FINDINGS: Decreased right pleural effusion following thoracentesis  with moderate pleural effusion remaining. Left PICC line tip is in the  low SVC. Right IJ lumen catheter tip in the low SVC. Heart size is  normal. Left lung is clear. Improved aeration in the right lung.      Impression    IMPRESSION: Decreased pleural fluid on the right with moderate  remaining right pleural effusion.      VIKTOR BRUCE MD         SYSTEM ID:  [id number]     Medications     - MEDICATION INSTRUCTIONS -       dextrose       heparin (porcine) 1,000 Units/hr (08/24/21 0907)       sodium bicarbonate  325 mg Per Feeding Tube Q4H    And     amylase-lipase-protease  1-2 capsule Per Feeding Tube Q4H     amylase-lipase-protease  2 capsule Oral TID w/meals     B and C vitamin Complex with folic acid  5 mL Per Feeding Tube Daily     fiber modular (NUTRISOURCE FIBER)  1 packet Per Feeding Tube TID     folic acid  1 mg Oral Daily     heparin lock flush  5-20 mL Intracatheter Q24H     melatonin  6 mg Oral At Bedtime     menthol   Transdermal Q8H     oxyCODONE  5 mg Oral Q8H     pantoprazole (PROTONIX) IV  40 mg Intravenous Daily with breakfast     protein modular  1 packet Per Feeding Tube Daily     sevelamer carbonate (RENVELA)  0.8 g Oral TID     vitamin B1  100 mg Oral Daily    Or     thiamine  100 mg Intravenous Daily

## 2021-09-20 NOTE — PLAN OF CARE
Assumed Cares: [id number]      Significant Events: NPO at midnight with tube feeds turned off at midnight. PRN atarax x1, prn dilaudid x1, prn tylenol x1. Pt triggered sepsis for hypotension and low temperature, lactic 1.4, pt declined symptoms with hypotensive episodes. Left PICC, saline locked. G tube to gravity. Diminished in bilateral lungs, denies SOB or difficulty breathing. Pt appeared to be sleeping between cares.     Plan of care:  EGD and PEG site closure this AM, Continue with POC, update treatment team as needed with changes.

## 2021-09-20 NOTE — PROVIDER NOTIFICATION
Notified MDA Dr Phillips about hypotension. Per MD does not need to be treated, will continue to monitor.

## 2021-09-21 ENCOUNTER — APPOINTMENT (OUTPATIENT)
Dept: OCCUPATIONAL THERAPY | Facility: CLINIC | Age: 31
End: 2021-09-21
Attending: INTERNAL MEDICINE
Payer: MEDICAID

## 2021-09-21 LAB
ANION GAP SERPL CALCULATED.3IONS-SCNC: 12 MMOL/L (ref 3–14)
BUN SERPL-MCNC: 43 MG/DL (ref 7–30)
CALCIUM SERPL-MCNC: 8.8 MG/DL (ref 8.5–10.1)
CHLORIDE BLD-SCNC: 93 MMOL/L (ref 94–109)
CO2 SERPL-SCNC: 24 MMOL/L (ref 20–32)
CREAT SERPL-MCNC: 2.69 MG/DL (ref 0.66–1.25)
GFR SERPL CREATININE-BSD FRML MDRD: 30 ML/MIN/1.73M2
GLUCOSE BLD-MCNC: 157 MG/DL (ref 70–99)
GLUCOSE BLDC GLUCOMTR-MCNC: 159 MG/DL (ref 70–99)
HOLD SPECIMEN: NORMAL
MAGNESIUM SERPL-MCNC: 2.2 MG/DL (ref 1.6–2.3)
POTASSIUM BLD-SCNC: 4.4 MMOL/L (ref 3.4–5.3)
SODIUM SERPL-SCNC: 129 MMOL/L (ref 133–144)

## 2021-09-21 PROCEDURE — 250N000013 HC RX MED GY IP 250 OP 250 PS 637

## 2021-09-21 PROCEDURE — 250N000011 HC RX IP 250 OP 636: Performed by: INTERNAL MEDICINE

## 2021-09-21 PROCEDURE — 97535 SELF CARE MNGMENT TRAINING: CPT | Mod: GO

## 2021-09-21 PROCEDURE — 99233 SBSQ HOSP IP/OBS HIGH 50: CPT | Mod: GC | Performed by: STUDENT IN AN ORGANIZED HEALTH CARE EDUCATION/TRAINING PROGRAM

## 2021-09-21 PROCEDURE — 99233 SBSQ HOSP IP/OBS HIGH 50: CPT | Performed by: CLINICAL NURSE SPECIALIST

## 2021-09-21 PROCEDURE — 83735 ASSAY OF MAGNESIUM: CPT | Performed by: INTERNAL MEDICINE

## 2021-09-21 PROCEDURE — 250N000013 HC RX MED GY IP 250 OP 250 PS 637: Performed by: HOSPITALIST

## 2021-09-21 PROCEDURE — 82310 ASSAY OF CALCIUM: CPT | Performed by: INTERNAL MEDICINE

## 2021-09-21 PROCEDURE — 250N000013 HC RX MED GY IP 250 OP 250 PS 637: Performed by: INTERNAL MEDICINE

## 2021-09-21 PROCEDURE — 120N000002 HC R&B MED SURG/OB UMMC

## 2021-09-21 PROCEDURE — 97530 THERAPEUTIC ACTIVITIES: CPT | Mod: GO

## 2021-09-21 PROCEDURE — 36592 COLLECT BLOOD FROM PICC: CPT | Performed by: INTERNAL MEDICINE

## 2021-09-21 PROCEDURE — 99253 IP/OBS CNSLTJ NEW/EST LOW 45: CPT | Performed by: PSYCHIATRY & NEUROLOGY

## 2021-09-21 PROCEDURE — C9113 INJ PANTOPRAZOLE SODIUM, VIA: HCPCS | Performed by: INTERNAL MEDICINE

## 2021-09-21 RX ORDER — AMOXICILLIN 400 MG/5ML
500 POWDER, FOR SUSPENSION ORAL EVERY 8 HOURS SCHEDULED
Status: DISCONTINUED | OUTPATIENT
Start: 2021-09-21 | End: 2021-09-22

## 2021-09-21 RX ORDER — METHYLPHENIDATE HYDROCHLORIDE 5 MG/1
5 TABLET ORAL 2 TIMES DAILY
Status: DISCONTINUED | OUTPATIENT
Start: 2021-09-21 | End: 2021-09-21

## 2021-09-21 RX ORDER — METHYLPHENIDATE HYDROCHLORIDE 5 MG/1
5 TABLET ORAL 2 TIMES DAILY
Status: DISCONTINUED | OUTPATIENT
Start: 2021-09-22 | End: 2021-10-12 | Stop reason: HOSPADM

## 2021-09-21 RX ORDER — AMOXICILLIN 500 MG/1
500 CAPSULE ORAL EVERY 8 HOURS SCHEDULED
Status: DISCONTINUED | OUTPATIENT
Start: 2021-09-21 | End: 2021-09-21

## 2021-09-21 RX ADMIN — PANCRELIPASE 2 CAPSULE: 24000; 76000; 120000 CAPSULE, DELAYED RELEASE PELLETS ORAL at 01:04

## 2021-09-21 RX ADMIN — HYDROMORPHONE HYDROCHLORIDE 1 MG: 1 SOLUTION ORAL at 13:47

## 2021-09-21 RX ADMIN — HYDROXYZINE HYDROCHLORIDE 50 MG: 25 TABLET, FILM COATED ORAL at 01:04

## 2021-09-21 RX ADMIN — HYDROMORPHONE HYDROCHLORIDE 1 MG: 1 SOLUTION ORAL at 01:04

## 2021-09-21 RX ADMIN — Medication 1 PACKET: at 19:12

## 2021-09-21 RX ADMIN — SEVELAMER CARBONATE 1.6 G: 800 POWDER, FOR SUSPENSION ORAL at 08:46

## 2021-09-21 RX ADMIN — OXYCODONE HYDROCHLORIDE 5 MG: 5 SOLUTION ORAL at 21:12

## 2021-09-21 RX ADMIN — PANCRELIPASE 2 CAPSULE: 24000; 76000; 120000 CAPSULE, DELAYED RELEASE PELLETS ORAL at 04:47

## 2021-09-21 RX ADMIN — Medication 1 PACKET: at 13:02

## 2021-09-21 RX ADMIN — Medication 5 ML: at 06:25

## 2021-09-21 RX ADMIN — ONDANSETRON 4 MG: 2 INJECTION INTRAMUSCULAR; INTRAVENOUS at 17:35

## 2021-09-21 RX ADMIN — SODIUM BICARBONATE 325 MG: 325 TABLET ORAL at 04:47

## 2021-09-21 RX ADMIN — ONDANSETRON 4 MG: 2 INJECTION INTRAMUSCULAR; INTRAVENOUS at 07:46

## 2021-09-21 RX ADMIN — SODIUM BICARBONATE 325 MG: 325 TABLET ORAL at 12:47

## 2021-09-21 RX ADMIN — PANCRELIPASE 2 CAPSULE: 24000; 76000; 120000 CAPSULE, DELAYED RELEASE PELLETS ORAL at 08:48

## 2021-09-21 RX ADMIN — OXYCODONE HYDROCHLORIDE 5 MG: 5 SOLUTION ORAL at 04:47

## 2021-09-21 RX ADMIN — Medication 1 PACKET: at 08:47

## 2021-09-21 RX ADMIN — THIAMINE HCL TAB 100 MG 100 MG: 100 TAB at 08:46

## 2021-09-21 RX ADMIN — Medication 6 MG: at 01:04

## 2021-09-21 RX ADMIN — Medication 5 ML: at 08:47

## 2021-09-21 RX ADMIN — SEVELAMER CARBONATE 1.6 G: 800 POWDER, FOR SUSPENSION ORAL at 17:35

## 2021-09-21 RX ADMIN — AMOXICILLIN 500 MG: 400 POWDER, FOR SUSPENSION ORAL at 21:12

## 2021-09-21 RX ADMIN — Medication 15 ML: at 17:35

## 2021-09-21 RX ADMIN — SODIUM BICARBONATE 325 MG: 325 TABLET ORAL at 01:04

## 2021-09-21 RX ADMIN — FOLIC ACID 1 MG: 1 TABLET ORAL at 08:47

## 2021-09-21 RX ADMIN — OXYCODONE HYDROCHLORIDE 5 MG: 5 SOLUTION ORAL at 12:47

## 2021-09-21 RX ADMIN — HYDROXYZINE HYDROCHLORIDE 50 MG: 25 TABLET, FILM COATED ORAL at 10:25

## 2021-09-21 RX ADMIN — PANCRELIPASE 2 CAPSULE: 24000; 76000; 120000 CAPSULE, DELAYED RELEASE PELLETS ORAL at 19:06

## 2021-09-21 RX ADMIN — HYDROMORPHONE HYDROCHLORIDE 1 MG: 1 SOLUTION ORAL at 17:51

## 2021-09-21 RX ADMIN — HYDROMORPHONE HYDROCHLORIDE 1 MG: 1 SOLUTION ORAL at 07:15

## 2021-09-21 RX ADMIN — PANCRELIPASE 2 CAPSULE: 24000; 76000; 120000 CAPSULE, DELAYED RELEASE PELLETS ORAL at 16:04

## 2021-09-21 RX ADMIN — PANTOPRAZOLE SODIUM 40 MG: 40 INJECTION, POWDER, FOR SOLUTION INTRAVENOUS at 07:46

## 2021-09-21 RX ADMIN — SEVELAMER CARBONATE 1.6 G: 800 POWDER, FOR SUSPENSION ORAL at 21:12

## 2021-09-21 RX ADMIN — PANCRELIPASE 2 CAPSULE: 24000; 76000; 120000 CAPSULE, DELAYED RELEASE PELLETS ORAL at 12:47

## 2021-09-21 RX ADMIN — SODIUM BICARBONATE 325 MG: 325 TABLET ORAL at 19:06

## 2021-09-21 RX ADMIN — SODIUM BICARBONATE 325 MG: 325 TABLET ORAL at 16:04

## 2021-09-21 RX ADMIN — SODIUM BICARBONATE 325 MG: 325 TABLET ORAL at 08:47

## 2021-09-21 ASSESSMENT — ACTIVITIES OF DAILY LIVING (ADL)
ADLS_ACUITY_SCORE: 18

## 2021-09-21 ASSESSMENT — MIFFLIN-ST. JEOR: SCORE: 1557.75

## 2021-09-21 NOTE — PLAN OF CARE
Patient back from OR/PACU/ Dialysis unit in fair condition via bed calm, verbally responsive, withdrawn with GJ tube in place, G tube draining thru gravity and J tube clamped. He was then sent to Radiology for CT Dental without contrast. He was made comfortable in bed and Due meds given. TF restarted at 60 ml/hr via J tube and so far has been tolerating it well. Pain meds given  for abdominal and incisional pain which offered good relief. Abdominal incision intact, well approximated, appeared reddened but no drainage noted. Refused to turn and skin assessment on his buttocks, coccyx and back. Resting in bed as of this time.

## 2021-09-21 NOTE — PROGRESS NOTES
Nephrology Progress Note  09/21/2021           Dax Villareal is a 30 year old male with h/o ETOH hepatitis, end stage liver disease, RADHA on HD, transferred from [facility] in [place] for septic shock on 6/28/21 for treatment of necrotizing pancreatitis and decompensated liver disease. He was initially admitted to [facility] 5/19/21 and started on RRT 5/26/2021.        Interval History :   Mr Villareal had necrosectomy for 4th time on 8/11 with no more anticipated.  Ran HD yesterday after OR for closure of GI fistula related to G-tube.  Needed pain relief but otherwise uneventful run, pulled 700cc but was NPO so this made him net negative 1L with other sources of output.  Phos has been intermittently very high, likely related to intake.  Offered extra run to correct Na and Phos but he only wants to run MWF and only for 3h.          Assessment & Recommendations:   ESRD-Cr was 0.8 as recently as 5/19/2021 but now HD dependent for 3 months, started RRT at [facility] prior to transfer to [facility] on 5/26.  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  continuing restorative cares for now, BP's have been stable enough to support HD.  Had GI stenting of pancreas x4.                 -Line is TDC from PTA               -Planning HD on MWF schedule, refuses extra runs, will continue to offer.                 -CXR to evaluate increased O2 needs.       Volume status-Anuric, wt at low for his course, not pulling much UF with runs but will continue to try to challenge EDW.      Electrolytes/pH-Na 129, K 4.4, no acute issues.      Ca/phos/pth-Ca 9.6, Mg 2.4, Phos very high at ~10 yesterday which is likely due to his PO intake.  Increased Sevelamer, is on Ca carbonate as well and should come down with HD, following closely.                     Anemia-Hgb low at 9.4, acute management per team, last PRBC's 7/14.  Started on EPO 5k with runs, iron sats 43%  "     Nutrition-Taking PO, appetite poor lately, has been NPO due to G tube placement yesterday, would change to renal formula to limit K and Phos.       Seen and discussed with Dr Ahuja      Recommendations were communicated to primary team via note       SOFIA Marlow CNS  Clinical Nurse Specialist  [phone]      Review of Systems:   I reviewed the following systems:  Gen: No fevers or chills  CV: No CP at rest  Resp: No SOB at rest  GI: No N/V      Physical Exam:   I/O last 3 completed shifts:  In: 930 [I.V.:10; NG/GT:440]  Out: 940 [Emesis/NG output:240; Other:700]   BP (!) 92/37 (BP Location: Right arm)   Pulse 94   Temp (!) 96.2  F (35.7  C) (Oral)   Resp 17   Ht 1.676 m (5' 6\")   Wt 66.5 kg (146 lb 9.7 oz)   SpO2 94%   BMI 23.66 kg/m       GENERAL APPEARANCE: Lying in bed, in no distress.    EYES:  scleral icterus, pupils equal  Pulmonary: decreased BS   CV: tachy   - Edema - no LE edema  GI: mild distention, non-tender. + tenderness at G-tube site and generalized abdominal pain.    MS: no evidence of inflammation in joints, no muscle tenderness  SKIN: no rash, warm, dry, no cyanosis, + G tube erythremia   NEURO: alert/interactive  ACCESS: Right TDC with no induration or erythema    Labs:   All labs reviewed by me  Electrolytes/Renal - Recent Labs   Lab Test 09/21/21  0233 09/20/21  1807 09/20/21  1032 09/20/21  0537 09/19/21  0719 09/18/21  0542 09/18/21  0542 09/17/21  0555 09/17/21  0555 09/16/21  0533 09/16/21  0533 09/13/21  1545   NA  --   --   --   --  126*  --  129*  --  128*   < > 131*  --    POTASSIUM  --   --   --   --  4.2  --  3.8  --  4.6   < > 4.4  --    CHLORIDE  --   --   --   --  87*  --  95  --  93*   < > 97  --    CO2  --   --   --   --  23  --  23  --  25   < > 27  --    BUN  --   --   --   --  51*  --  26  --  43*   < > 30  --    CR  --   --   --   --  3.85*  --  2.57*  --  3.64*   < > 2.68*  --    * 141* 107*  --  114*   < > 127*   < > 120*   < > 100*   < >   JANENE "  --   --   --   --  9.6  --  8.9  --  9.4   < > 8.7  --    MAG  --   --   --  2.6* 2.4*  --  2.0   < > 2.2   < > 2.0  --    PHOS  --   --   --  10.5*  --   --  6.2*  --   --   --  5.5*  --     < > = values in this interval not displayed.       CBC -   Recent Labs   Lab Test 09/20/21  0537 09/18/21  0542 09/16/21  0533   WBC 11.3* 10.7 10.7   HGB 9.4* 8.6* 8.6*    264 290       LFTs -   Recent Labs   Lab Test 09/04/21  0414 09/03/21  0523 09/02/21  1730   ALKPHOS 81 95 120   BILITOTAL 1.4* 2.0* 2.2*   ALT 29 31 40   AST 38 39 57*   PROTTOTAL 8.8 9.2* 10.7*   ALBUMIN 2.9* 2.3* 2.7*       Iron Panel -   Recent Labs   Lab Test 08/14/21 2055 07/19/21  0632   IRON 28* 31*   IRONSAT 21 43   JERRELL 2,186*  --            Current Medications:    sodium bicarbonate  325 mg Per Feeding Tube Q4H    And     amylase-lipase-protease  1-2 capsule Per Feeding Tube Q4H     amylase-lipase-protease  2 capsule Per G Tube TID w/meals     B and C vitamin Complex with folic acid  5 mL Per Feeding Tube Daily     fiber modular (NUTRISOURCE FIBER)  1 packet Per Feeding Tube TID     folic acid  1 mg Oral or Feeding Tube Daily     heparin lock flush  5-20 mL Intracatheter Q24H     melatonin  6 mg Oral or Feeding Tube At Bedtime     menthol   Transdermal Q8H     oxyCODONE  5 mg Oral or Feeding Tube Q8H     pantoprazole (PROTONIX) IV  40 mg Intravenous Daily with breakfast     protein modular  1 packet Per Feeding Tube Daily     sevelamer carbonate (RENVELA)  1.6 g Oral or Feeding Tube TID     vitamin B1  100 mg Oral or Feeding Tube Daily    Or     thiamine  100 mg Intravenous Daily       - MEDICATION INSTRUCTIONS -       dextrose       heparin (porcine) 1,000 Units/hr (08/24/21 0907)

## 2021-09-21 NOTE — PLAN OF CARE
[id number] Minimal energy today. Slept off and on for the duration of the shift. Did allow for completed skin check; coccyx/sacrum WNL mepi in place. WOC consult placed for G tube. Was unable to tolerate a through site cleanse. Secretions dried and difficult to remove. Site reddened. Buttons in place. New gauze placed. G tube to LIS. Output 750 during shift. Tube feeds at 60 mL. Episode of nausea this AM when waking up. Resolved post zofran administration. No episode of emesis. Had one bowel movement. NPO: ok for few ice chips for mouth to get wet. Pt should not be swallowing ice; fistula needs help healing. T/R q2hr. Declined oral cares. Worked with therapy today. Stable on 1L NC. CAPNO removed.

## 2021-09-21 NOTE — PROGRESS NOTES
CLINICAL NUTRITION SERVICES - REASSESSMENT NOTE     Nutrition Prescription    RECOMMENDATIONS FOR MDs/PROVIDERS TO ORDER:  TF changed from semi-elemental formula to a renal base formula to limit phos and K+ in the diet.     Malnutrition Status:    Severe malnutrition in the context of acute on chronic illness     Recommendations already ordered by Registered Dietitian (RD):  Enteral nutrition recommendation:  Access: Jejunostomy tube of the PEG/J (G-tube open to gravity at all times per RN report).   Dosing wt: 65 kg lower wt this admit on 9/18/21     EN: Ordered to re-start  tube feeds with renal formula, Novasource Renal @ 25 ml/hr, advance by 10 ml every 8 hours to new goal at 45 ml/hr.     Novasource Renal @ 45 ml/hr (1080 ml) provides 2160 kcal (33 kcal/kg),  98 gm pro (1.5 gm/kg ), 198 gm CHO, 108 gm fat, 774 ml free water, and 0 gm fiber daily.      Modules:   --Protein modules: Prosource TF free 1 packet daily via FT ( 90 kcal, 15 gm protein)  --PERT:  Creon 24, 2 capsules + 325 mg bicarb solution, Q 4 hours via FT. Providing 2667 units lipase/total gm fat in tube feeds.   --Fiber: Continue with Nutrisource fiber 1 packet TID      FWF: Per nephrology     Future/Additional Recommendations:  Monitor phos/mg++/K+ trend with non renal formula   Monitor loose stool trend, need to further increase soluble fiber + need to increase PERT         EVALUATION OF THE PROGRESS TOWARD GOALS   Diet:   NPO for 1 week  Per GI note: Patient has large fistula with draining from the old G-tube site and underwent endoscopic closure with APC and suture gastropexy (9/20/21). No leakage post suture. GI recommended NPO status for 1 week and keep G-tube to LIS and to continue with TF via Jejunostomy tube      Nutrition Support: ( on TF since 6/30 admit)  Dosing wt: Previously 65 kg low recent wt on 9/18/21  Access: Jejunostomy tube of PEG/J replaced on 9/13/21 ( G-tube open to gravity at all times per RN report).     EN: Vital 1.5 @  goal of  60 ml/hr,  Provision:  (1440 ml/day), 2160 kcal's (33 kcal/kg), 97 gm PRO (1.5 gm/kg), 82 gms fat /day, 1100 ml free H2O, 269 gm CHO, and 8 gm soluble fiber daily.    Modules:   --Nutrisource fiber, 1 packet TID for diarrhea   --Prosource TF Free, 1 packet daily via FT ( 90 kcal + 15 gm protein) = 112 gm protein (1.8 gm/kg). TF+ prosource = 2.0 gm protein/kg.       FWF: @ 20 ml/hr via feed and flush continuous rate for patency ( patient on HD, further water flush adjustment per team).     PERT: Creon 24, 2 capsules + 325 mg bicarb solution, Q 4 hours via FT->  providing 3512 units lipase/total gm fat in tube feeds.      Intake:   PO: NPO since yesterday     EN:  Tolerating EN. Per discussion with RN, patient with no N/V. G-tube is open to gravity. TF has been on hold frequently during HD, with frequent procedures and when patient feels nauseas. Patient has not been getting adequate daily goal volume.      Average 6 day of EN intake: NPO for procedure 9/20:  (807 ml daily) ( met 56% of the TF goal volume) => provided on average ~ 1210 kcal/day and 54 gm protein/day.    TF + Prosource Free => provided 1300 kcal/day (21 kcal/kg) and 72 gm protein ( 1.1 gm/kg).       Updated 9/21/21: ASSESSED NUTRITION NEEDS per 65 kg wt this admit on 9/18  Estimated Energy Needs: 1950- 2275 kcals/day, (30-35 kcals/kg)   Justification: Hypermetabolism with chronic illness, ( pancreatitis/HD/ESLD)  Estimated Protein Needs: 98++ g/day (1.5 ++ g/kg)   Justification: Pancreatitis and Hypercatabolism with critical illness / HD repletion,    Estimated Fluid Needs: Per provider pending fluid status (ESLD/HD/Anuric)      NEW FINDINGS   Chart reviewed:   -ESLD with HE and HRS, requiring HD ( has been on HD since 5/26/21),  necrotizing pancreatitis/infected pseudocyst, s/p PEG-J placement on 7/13/21.     --Admitted secondary to SBP (treated)  --ESRD ( Anuric and on HD)  --HE, Mental status improved with aggressive lactulose and  rifaximin, which was discontinued.    --Necrotizing pancreatitis with infected pancreatic pseudocyst s/p RP Drain and Cystogastrostomy, and persistent right pleural effusion. Had GI stenting of pancreas x4. Multiple Necrosectomies, last one on .  - IR perc drain removed,     --Resp: On O2 (3-4 liters) due to hepatic and pancreatic hydrothorax, on thoracentesis PRN    --GI fistula related to G-tube: underwent G-tube removal and putting in new PEGJ tube at the new site 21 without immediate complication. Patient has large fistula with draining from the old G-tube site and underwent endoscopic closure with APC and suture gastropexy (21). No leakage post suture but will continue to observe.       WOC RN note on :   Abdominal wounds due to Surgical Wound  Status: peristomal skin covered with urostomy pouch so unable to assess skin. Patient not interested in having me assess it as it feels better. RN stated that skin was red and intact    WT trend:   Wt's are unreliable. Per discussion with nursing staff, likely due to bed wt not placed at zero. EDW is not available.   -Pre HD run weight 66.5 kg (146 lb 9.7 oz) on . Admission weight 80.2 kg (21). EDW not available.   -- Significant wt loss since admit per wt trend. Patient with substantial high daily g-tube output due to GOO and frequent hold on TF support.   -Receiving limited water flushes     Meds:  Sodium Bicarbonate/Creon with TFs, Creon with meals,   Nephronex 5 ml daily per FT, calcium carbonate 500 mg daily, Sevelamer carbonate 0.8 gm packet TID PO  Remains on  thiamine, Folate.    Labs:   CRP: 58 (), WBC:11.3  (Elevated),   Fecal elastase: 162 (L) on    BUN: 43, Cr:2.69, Na+: 129(L)-> On HD ->   -> (K+:4.4,  Mg++:2.2, Phos: 10.5 () -> on non renal formula, also patient drinks soda)  Bicarb: 24 (normal range)   Total bili: 1.4 (on ), INR: 1.51 (on )   B (elevated)     GI:  Off Lactulose/Rifaxamin.   Having 2-4  stool daily despite Nutrisource fiber + PERT  Not on any anti motility agents     MALNUTRITION  % Intake: On TF's with frequent interruptions, <75%>1 month - Severe   % Weight Loss: 31% net wt loss since admit ~ 3 month   Subcutaneous Fat Loss: Global Severe / cachectic appearing  Muscle Loss: Global Severe / cachectic appearing  Fluid Accumulation/Edema: 1+ Generalized edema ( trace), 0 BLE   Malnutrition Diagnosis: Severe malnutrition in the context of Chronic condition     Previous Goals   Total avg nutritional intake to meet a minimum of 35 kcal/kg and 1.5 gm PRO/kg daily (per dosing wt 55 kg dry wt on 9/13/21 ).  Evaluation: Not met     Previous Nutrition Diagnosis  Wt loss related to Inadequate protein-energy intake associated with daily interruption to TF support, ( patient holds TF frequently for HD and other daily personal cares)  and likely increased metabolic demand with chronic illness,  not meeting daily goal TF volume and ongoing poor po as evidenced by average 6 days TF met 59% of goal TF volume and PO remained minimal with associated significant wt loss of 31% body wt since admit over the past ~ 3 month.   Evaluation: No change    CURRENT NUTRITION DIAGNOSIS  Inadequate energy/protein intake likely associated with daily interruption to TF support, (patient holds TF frequently for HD and with nausea) with increase needs associated with increased metabolic demand with chronic illness,  not meeting daily goal TF volume daily as evidenced by average 6 days TF met 56% of goal TF volume and PO remained minimal     INTERVENTIONS  Implementation  Enteral Nutrition: previously patient was on Nova source renal formula with nausea and inability to tolerate renal formula. TF switched back to Vital 1.5 on 9/3. Now with elevated phos levels despite HD therapy and adding phos via FT. Will switch TF back to a renal base formula tonight.     Feeding tube flush - continue with feed and flush, @ 20 ml/hr patency.  Patient is anuric and on HD. MD to increase FWF       Modules: Nutrisource fiber TID via FT for frequent loose stool + 1 packet of Prosource TF free daily to continue     Goals  Total avg nutritional intake to meet a minimum of 35 kcal/kg and 1.5 gm PRO/kg daily (per dosing wt 55 kg dry wt on 9/13/21 ).    Monitoring/Evaluation  Progress toward goals will be monitored and evaluated per protocol.      Jesica Blunt RD/MARYELLEN  Pager [phone]

## 2021-09-21 NOTE — PROGRESS NOTES
[facility]    Progress Note - [place] 3 Service       Date of Admission:  6/28/2021    Assessment & Plan             aDx Villareal is a 30 year old male with hx of alcohol use disorder admitted on 6/28/2021 after recent prolonged admission at [facility] in [place] who has severe acute alcoholic hepatitis c/b HE, ESRD requiring HD, and malnutrition in the setting of acute necrotizing pancreatitis, s/p PEG-J placement on 7/13. He arrived with acute respiratory failure and septic shock that have resolved, and his secondary bacterial peritonitis has been appropriately treated. He requires continued management for ESRD, hepatic encephalopathy, infected pancreatic pseudocyst, nutritional support via PEG-J, and persistent right pleural effusion. Clinically improving after multiple necrosectomies. Increasing leukocytosis and hypotension on 9/2 prompting resumption of IV antibiotics - has been stable since that time. Repeat CT with interval improvement in fluid collections.     Today:  - Started amoxicillin x7 days for dental infection. Tomorrow will coordinate w/ social work regarding dental follow-up for extractions  - Psych eval completed today, recommended Ritalin. Low concern for depression or need for meds  - Wound consult placed for g-tube site  - Will wait to repeat CRP until a couple days post-op (GI, EGD to close fistula site 9/20)  - HD tomorrow  - Thoracentesis 9/19 without signs of infection, 1500mLs removed  - Encouraged participation in personal hygiene, including brushing his teeth  - Elevated CRP - suspect secondary to abdominal wound, repeat after closure  - GI - EGD to close fistula site today  - HD today    #Septic Shock, resolved  #Necrotizing alcoholic pancreatitis w/ infected pseudocyst, improving  #Hepatic Fluid collection, stable to improving  #Secondary Bacterial Peritonitis 2/2 infected pseudocyst with VRE, improving  Admitted to the [facility] ICU on  6/28/2021 from [facility] in [place] for septic shock due to necrotizing pancreatitis. CT on admit showing mature collection with enhancing wall measuring ~[id number]. IR placed perc drain 6/29. Repeat imaging 7/18 revealed walled off necrotic collection amenable to endoscopic transluminal drainage with cystgastrostomy stenting with necrosectomy on 7/21/21. Complete necrosectomy on 8/11. Antibiotics (cefepime and metronidazole) were discontinued on 8/25 after left flank drain removed, re-initiated in the setting of fever on 9/2. Favor abdominal etiology, repeat CT on 9/7 with interval improvement in fluid collections - will complete 7d course of abx per ID with EOT 9/8.    - Will likely need intermittent antibiotics with fluid collections occasionally with bacterial growth moving forward as to be expected in nec panc     #G-Tube Fistula   Dax had been having increasing G-tube output and was found to have an erythematous indurated area next to his G-tube insertion. This was found to be his G-tube balloon pushing through the skin. He underwent G-tube replacement on  9/13/21. His old G-tube site has since had large volume output requiring frequent wound care dressing changes.   - 9/20/21 - EGD to close fistula completed by GI  - wound consult placed, patient hesitant about wound cares and there is concern for future skin breakdown    #Periapical abscess: L 3rd maxillary molar  #Dental Caries  - Dentistry consulted, and discussed with oral surgery. Recommended oral amoxicillin x7days and dental extraction when stable to be transported (while inpatient) to [facility]    # Hypovolemia  Patient with substantial daily NG output and poor PO. Also note significant weight loss over the past few weeks. He has needed intermittent boluses of normal saline for euvolemia. Have discussed with renal multiple times - please re-evaluate volume status vs dry weight as he is often hypotensive following fluid  removal.   - Weight 78kg on 8/13. Now 55 kg. Concern for weight loss vs. targeting dry weight.  -Monitor vitals and output post dialysis, consider fluids prn.  - Consider discussing using midodrine with Nephrology      #Gastric Outlet Obstruction  High G tube output due to the cystogastric connection/fluid drainage noted on 8/5. Per GI, possibly related to gastric outlet from severe abdominal inflammation and is expected. KUB negative for obstructive gas pattern. Continues to have mild to moderate NG output - stable to improved. Relieved with draining G tube to gravity when experiencing pain/nausea.      #Hyponatremia, stable  Patient with down-trending sodium the week of 8/27; likely due to substantial NG output and low solute intake and hepatic dysfunction. Stable 127-128.     #Right Pancreatic Hydrothorax, stable  Increased dyspnea with CXR on 7/19 with further imaging and thoracentesis confirming a hepatic/pancreatic hydrothorax. Thoracentesis PRN for dyspnea, most recently 9/3.      #Alcoholic Hepatitis  #Coagulation Defect  Received 1 week of steroids at [facility] for elevated INR and altered mental status. Not a liver transplant candidate. Mental status improved with aggressive lactulose and rifaxamin, which was discontinued without return of AMS.     ESRD due to ATN  Hyperphosphatemia  Stage III RADHA due to ATN from septic shock without recovery for >3 months. Line is [facility] from [facility]. Nephrology dialyzed on Tu, Th, Sa schedule.  - Will need follow-up and outpatient dialysis  - Sevelemer powder TID with TF for hyperphosphatemia      #Anemia due to Chronic Illness  #Hematochezia, resolved  #Hemoperitoneum  Baseline hgb 17. Running between 6-8 in setting of chronic illness, new ESRD with epocrit per Nephrology, frequent lab draws and procedures. Hematochezia on 8/8 after starting standard intensity heparin, now resolved. Paracentesis on 8/21 with hemoperitoneum. No active bleeding. Required a unit of PRBC on 9/4  in the setting of several fluid boluses.   - Goal hgb > 7.0, trend Hgb  - Hgb has been stable.      #Alcohol Use Disorder  Counseled patient on complete alcohol cessation. Not interested in additional services at this time. Continue daily folic acid and thiamine.  - Ensure patient has resources for treatment at discharge if managing alcohol cessation independently becomes challenging.     #Lactic Acidosis  Intermittent Lactic acidosis in the setting of sepsis, ESRD, bleeding.     #Hypokalemia  Noted in the setting of high gastric output and improved once G-tube output decreased. CTM.      #Severe Malnutrition  Poor oral intake in the setting of necrotizing pancreatitis. PEG tube placed 7/13, replaced 9/14. Dietitian consulted and continuous tube feeds with goal of 65 ml/hr with continuous pancreatic enzymes. Transitioning to renal formula.   - Continuous tube feeds       Diet: Adult Formula Drip Feeding: Continuous Vital 1.5; Jejunostomy; Goal Rate: 60; mL/hr; Medication - Feeding Tube Flush Frequency: At least 15-30 mL water before and after medication administration and with tube clogging; Amount to Send (Nutrition us...  NPO for Medical/Clinical Reasons Except for: NPO but receiving PN, Ice Chips    DVT Prophylaxis: Pneumatic Compression Devices  Rdz Catheter: Not present  Fluids: none  Central Lines: PRESENT  PICC Triple Lumen 06/28/21 Left-Site Assessment: WDL  CVC Double Lumen Right-Site Assessment: WDL  Code Status: Full Code      Disposition Plan   Expected discharge: 09/24/2021   recommended to long term care facility once safe disposition plan/ TCU bed available and PEG site closed and inflammation markers down.     The patient's care was discussed with the attending physician Dr. Ibanez.    Marleny Monk MD  [address]  Securely message with the Vocera Web Console (learn more here)  Text page via [employer] Paging/Directory  Please see sign  in/sign out for up to date coverage information    ______________________________________________________________________    Interval History   Dax reports that he continues to feel tired. He states his abdominal pain is unchanged. No other complaints. States he would like to nap.    Data reviewed today: I reviewed all medications, new labs and imaging results over the last 24 hours. I personally reviewed the chest x-ray image(s) showing decreased plerual effusion without pneumo .    Physical Exam   Vital Signs: Temp: (!) 96  F (35.6  C) Temp src: Oral BP: (!) 95/29 Pulse: 97   Resp: 18 SpO2: 93 % O2 Device: Nasal cannula Oxygen Delivery: 1 LPM  Weight: 146 lbs 9.69 oz  General Appearance: awake, alert and interactive, appears sleepy  Respiratory: diminished in RLL, CTAB in left lung  Cardiovascular: RRR  GI: Patient declined abdominal exam  Skin: some irritation surrounding prior PEG site  Other: Muscle atrophy of all four extremities with LE deformities

## 2021-09-21 NOTE — PROGRESS NOTES
GASTROENTEROLOGY PROGRESS NOTE    Date: 09/21/2021     ASSESSMENT:  30 year old male with a history of alcohol use disorder who presented to OSH 5/19 for abdominal pain, nausea and vomiting, and found to have alcohol hepatitis, ESLD on HD, septic shock requiring pressors as well as necrotizing pancreatitis with large evolving necrotic collection. Transferred to [facility] for drainage of presumed infected necrotic collection. S/p EUS transluminal and percutaneous drainage. GI re-consulted management of feeding tube (PEG/PEGJ).     # Poor nutrition s/p PEG-J placement  # Severe malnutrition in the context of chronic illness  GI renotified 9/13 for bulging G-tube with pain. Found to be displaced G-tube with bumper approaching the skin causing pain. Last week has tried pushing in, but failed. Thus, underwent G-tube removal and putting in new PEGJ tube at the new site 9/13/21 without immediate complication. Patient has large fistula with draining from the old G-tube site and underwent endoscopic closure with APC and suture gastropexy (9/20/21). No leakage post suture but will continue to observe.     # Necrotizing pancreatitis, related to alcohol use  Supportive care per primary team. No GI intervention required at this time for pancreatitis.     Recommendations:  - NPO for 1 week  - G tube to LIS for 1 week  - Continue TF via jejunostomy tube   - Monitor fistula site, apply daily dressing and notify service with any recurrent output.   -- For new PEGJ, the three white buttons along the gastrostomy represent the external bumpers of the T tags and these should be removed by cutting the underlying blue strings in two weeks (9/28/21)  -- Continue wound care as per WOCN    Gastroenterology follow up recommendations: As per previously scheduled (Dr. Chacko 9/30/21), if prolonged hospitalization can notify GI prior to discharge for follow-up appointment.     Thank you for involving us in this patient's care. Please do  "not hesitate to contact the GI service with any questions or concerns.      Pt care plan discussed with Dr. Erazo, GI staff physician.    Patricio Mattson MD  Gastroenterology Fellow  Page [phone]  _______________________________________________________________    Subjective: Pain improves significantly post suture, around the old G-tube site. No fever. No nausea/vomiting.     Objective:  Blood pressure (!) 92/37, pulse 94, temperature (!) 96.2  F (35.7  C), temperature source Oral, resp. rate 17, height 1.676 m (5' 6\"), weight 66.5 kg (146 lb 9.7 oz), SpO2 94 %.    Gen: A&Ox3, NAD  HEENT: sclera anicteric  CV: RRR  Lungs: breathing comfortably on room air  Abd: soft, nontender, distended, PEGJ tube in place without inflammation. Bandage around the old G-tube (removed) site without leakage.  Skin: no jaundice, no stigmata of chronic liver disease  MS: decreased muscle mass  Neuro: no tremor    LABS:  BMP  Recent Labs   Lab 09/21/21  0233 09/20/21  1807 09/20/21  1032 09/19/21  0719 09/18/21  0542 09/18/21  0542 09/17/21  0555 09/17/21  0555 09/16/21  0533 09/16/21  0533   NA  --   --   --  126*  --  129*  --  128*  --  131*   POTASSIUM  --   --   --  4.2  --  3.8  --  4.6  --  4.4   CHLORIDE  --   --   --  87*  --  95  --  93*  --  97   JANENE  --   --   --  9.6  --  8.9  --  9.4  --  8.7   CO2  --   --   --  23  --  23  --  25  --  27   BUN  --   --   --  51*  --  26  --  43*  --  30   CR  --   --   --  3.85*  --  2.57*  --  3.64*  --  2.68*   * 141* 107* 114*   < > 127*   < > 120*   < > 100*    < > = values in this interval not displayed.     CBC  Recent Labs   Lab 09/20/21  0537 09/18/21  0542 09/18/21  0542 09/16/21  0533 09/16/21  0533   WBC 11.3*  --  10.7  --  10.7   RBC 3.03*  --  2.86*  --  2.81*   HGB 9.4*   < > 8.6*   < > 8.6*   HCT 28.4*  --  27.6*  --  27.5*   MCV 94  --  97  --  98   MCH 31.0  --  30.1  --  30.6   MCHC 33.1  --  31.2*  --  31.3*   RDW 15.5*  --  16.1*  --  15.9*     --  " 264  --  290    < > = values in this interval not displayed.     INR  Recent Labs   Lab 09/19/21  1247   INR 1.51*     LFTsNo lab results found in last 7 days.   PANCNo lab results found in last 7 days.    IMAGING: no new in 24 hours.

## 2021-09-21 NOTE — PROVIDER NOTIFICATION
Low 5B K.R 36-1 WOC consult needed. has been resistant to g tube site care. Skin is reddened with much dried mucous. Concern for future skin breakdown Mimi RIVAS [phone]    Response: Order placed.

## 2021-09-21 NOTE — CONSULTS
"          Initial Psychiatric Consult   Consult date: September 21, 2021         Reason for Consult, requesting source:    Not participating in cares, PT, etc  Requesting source: Gaurav Coon    Labs and imaging reviewed. Discussed with team.         HPI:   From 6/28 H and P;  Dax Villareal is a 30 year old male with PMHx significant for alcohol use disorder who has had a prolonged hospitalization at [facility] in [place] for acute alcohol hepatitis/end stage liver disease complicated by hepatic encephalopathy, acute renal failure requiring hemodialysis, acute hypoxemic respiratory failure and septic shock secondary to acute necrotizing pancreatitis. Transferred to [facility] ICU on 6/28/2021 for further management of necrotizing pancreatitis by interventional GI, possible necrosectomy.    He is seen today by psychiatry due to concern over him not participating in cares. When asked about this he says that he is worn out for several days after his numerous procedures, so doesn't feel up to doing much during this time. Denies being depressed,, and does think that he is getting better; \"I can see the light at the end of the tunnel\". Is sleeping ok (5 hours at night, 5 hours during the afternoon). Melatonin helps. Is not hopeless, no significant loss of interest in usual activities, is not hopeless. Is anxious at times about his medical condition, but no panic. Initial hepatic encephalopathy has cleared.        Past Psychiatric History:   No treatment for psychiatric conditions, except for some meds for ADHD in childhood.         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 Denies prior consequences. No drug use, was a cigarette smoker. Intends to stop drinking on his own, not interested in a CD treatment.         Past Medical History:   PAST MEDICAL HISTORY:   Past Medical History:   Diagnosis Date     ADHD 2008 "       PAST SURGICAL HISTORY:   Past Surgical History:   Procedure Laterality Date     COMBINED ESOPHAGOSCOPY, GASTROSCOPY, DUODENOSCOPY (EGD), TISSUE APPOSI N/A 9/20/2021    Procedure: ESOPHAGOGASTRODUODENOSCOPY WITH SUTURE FISTULA CLOSURE, argon plasma coagulation;  Surgeon: Jose Quigley MD;  [facility]     ENDOSCOPIC INSERTION TUBE GASTROSTOMY N/A 7/13/2021    Procedure: Upper endoscopy, INSERTION, PEG-J TUBE and Gastropexy;  Surgeon: Rayshawn Will MD;  [facility]     ENDOSCOPIC INSERTION TUBE GASTROSTOMY  9/13/2021    Procedure: PEG/J PLACEMENT.;  Surgeon: Rayshawn Will MD;  [facility]     ENDOSCOPIC ULTRASOUND UPPER GASTROINTESTINAL TRACT (GI) N/A 7/21/2021    Procedure: ENDOSCOPIC ULTRASOUND, ESOPHAGOSCOPY / UPPER GASTROINTESTINAL TRACT (GI) with cyst gastrostomy, dilation;  Surgeon: Guru Yecenia Chacko MD;  [facility]     ENDOSCOPIC ULTRASOUND, ESOPHAGOSCOPY, GASTROSCOPY, DUODENOSCOPY (EGD), NECROSECTOMY N/A 8/11/2021    Procedure: ESOPHAGOGASTRODUODENOSCOPY (EGD) with necrosectomy, stent exchange.;  Surgeon: Amadou Beasley MD;  [facility]     ESOPHAGOSCOPY, GASTROSCOPY, DUODENOSCOPY (EGD), COMBINED N/A 7/28/2021    Procedure: ESOPHAGOGASTRODUODENOSCOPY (EGD), gastrostomy apposition, Necrosectomy, stent exchange.;  Surgeon: Rayshawn Will MD;  [facility]     ESOPHAGOSCOPY, GASTROSCOPY, DUODENOSCOPY (EGD), COMBINED N/A 8/6/2021    Procedure: ESOPHAGOGASTRODUODENOSCOPY (EGD) with necrosectomy, and stents exchanged;  Surgeon: Jose Quigley MD;  [facility]     ESOPHAGOSCOPY, GASTROSCOPY, DUODENOSCOPY (EGD), COMBINED Left 9/13/2021    Procedure: ESOPHAGOGASTRODUODENOSCOPY (EGD), GASTROSTOMY TUBE REMOVAL, FISTULAR CLOSURE ENDOSCOPIC.;  Surgeon: Rayshawn Will MD;  [facility]     IR ABSCESS TUBE CHANGE  7/14/2021     IR PARACENTESIS  7/21/2021     IR SINOGRAM INJECTION DIAGNOSTIC  8/25/2021     ORTHOPEDIC SURGERY  "Right     fracture repair     REPLACE GASTROSTOMY TUBE, PERCUTANEOUS N/A 2021    Procedure: gastro-jejunostomy tube exchange;  Surgeon: Rayshawn Will MD;  Location:  OR             Family History:   FAMILY HISTORY:   Family History   Problem Relation Age of Onset     Attention Deficit Disorder Mother      Alcoholism Mother      Alcoholism Father      Alcoholism Brother      Alcoholism Sister            Social History:   SOCIAL HISTORY:   Social History     Tobacco Use     Smoking status: Current Every Day Smoker     Packs/day: 0.50     Types: Cigarettes     Smokeless tobacco: Never Used     Tobacco comment: 2021 Patient did not want to discuss quitting but would like 4 mg lozenge to use during admission. Patient also accepted \"Quitting for Good\" workbook   Substance Use Topics     Alcohol use: Yes     G[place] up in [place] with 4 siblings, one . [facility] time [facility] and [facility]. Has GED, has worked a variety of jobs over the years. Never , no children.   Plans to live with a friend in [place] when he gets out of [facility]. This person does have alcohol in the home.          Physical ROS:   The 10 point Review of Systems is negative other than noted in the HPI or here.          Current Medications:     Current Facility-Administered Medications   Medication     acetaminophen (TYLENOL) solution 325 mg     sodium bicarbonate tablet 325 mg    And     amylase-lipase-protease (CREON 24) 61788-09683 units per EC capsule 1-2 capsule     amylase-lipase-protease (CREON 24) 88350-85666 units per EC capsule 2 capsule     B and C vitamin Complex with folic acid (NEPHRONEX) liquid 5 mL     benzonatate (TESSALON) capsule 100 mg     calcium carbonate (TUMS) chewable tablet 500 mg     Cetaphil Moisturizing (CETAPHIL)     cyclobenzaprine (FLEXERIL) tablet 10 mg     Daily 2 GRAM acetaminophen limit, unless fulminent liver failure or transaminases greater than or equal to 300 - 400, " then none     dextrose 10% infusion     glucose gel 15-30 g    Or     dextrose 50 % injection 25-50 mL    Or     glucagon injection 1 mg     fiber modular (NUTRISOURCE FIBER) packet 1 packet     flumazenil (ROMAZICON) injection 0.2 mg     folic acid (FOLVITE) tablet 1 mg     guaiFENesin (ROBITUSSIN) 20 mg/mL solution 10 mL     heparin 10,000 units/10 mL infusion (DIALYSIS USE)     heparin lock flush 10 UNIT/ML injection 5-20 mL     heparin lock flush 10 UNIT/ML injection 5-20 mL     HYDROmorphone (PF) (DILAUDID) injection 0.5 mg     HYDROmorphone (STANDARD CONC) (DILAUDID) oral solution 1 mg     hydrOXYzine (ATARAX) tablet 25-50 mg     lidocaine (LMX4) cream     lidocaine 1 % 0.1-1 mL     melatonin tablet 6 mg     menthol (ICY HOT) 5 % patch 1 patch    And     menthol (ICY HOT) Patch in Place     naloxone (NARCAN) injection 0.2 mg    Or     naloxone (NARCAN) injection 0.4 mg    Or     naloxone (NARCAN) injection 0.2 mg    Or     naloxone (NARCAN) injection 0.4 mg     nicotine (COMMIT) lozenge 4 mg     ondansetron (ZOFRAN-ODT) ODT tab 4 mg    Or     ondansetron (ZOFRAN) injection 4 mg     oxyCODONE (ROXICODONE) solution 5 mg     pantoprazole (PROTONIX) IV push injection 40 mg     polyethylene glycol (MIRALAX) Packet 17 g     protein modular (PROSOURCE TF Free) 1 packet     sevelamer carbonate (RENVELA) Packet 1.6 g     silver nitrate (ARZOL) Misc     sodium chloride (PF) 0.9% PF flush 10-20 mL     sodium chloride (PF) 0.9% PF flush 3 mL     sodium chloride (PF) 0.9% PF flush 3 mL     sodium chloride (PF) 0.9% PF flush 3 mL     sodium chloride (PF) 0.9% PF flush 3 mL     thiamine (B-1) tablet 100 mg    Or     thiamine (B-1) injection 100 mg     zinc oxide (DESITIN) 40 % ointment              Allergies:     Allergies   Allergen Reactions     Tylenol [Acetaminophen] Itching          Labs:     Recent Results (from the past 48 hour(s))   INR    Collection Time: 09/19/21 12:47 PM   Result Value Ref Range    INR 1.51 (H)  0.85 - 1.15   Extra Green Top (Lithium Heparin) Tube    Collection Time: 09/19/21 12:47 PM   Result Value Ref Range    Hold Specimen JIC    Pleural fluid Aerobic Bacterial Culture Routine with Gram Stain    Collection Time: 09/19/21  2:28 PM    Specimen: Pleural Cavity, Right; Pleural fluid   Result Value Ref Range    Culture No growth after 1 day     Gram Stain Result No organisms seen     Gram Stain Result 3+ WBC seen    Cell Count Body Fluid    Collection Time: 09/19/21  2:28 PM   Result Value Ref Range    Color Yellow (A) Colorless    Clarity Clear Clear, Bloody    Total Nucleated Cells 458 /uL   Differential Body Fluid    Collection Time: 09/19/21  2:28 PM   Result Value Ref Range    % Neutrophils 40 %    % Lymphocytes 35 %    % Monocyte/Macrophages      % Other Cells 26 %   Lactic acid whole blood    Collection Time: 09/19/21 10:25 PM   Result Value Ref Range    Lactic Acid 1.4 0.7 - 2.0 mmol/L   Magnesium    Collection Time: 09/20/21  5:37 AM   Result Value Ref Range    Magnesium 2.6 (H) 1.6 - 2.3 mg/dL   Phosphorus    Collection Time: 09/20/21  5:37 AM   Result Value Ref Range    Phosphorus 10.5 (H) 2.5 - 4.5 mg/dL   CBC with platelets    Collection Time: 09/20/21  5:37 AM   Result Value Ref Range    WBC Count 11.3 (H) 4.0 - 11.0 10e3/uL    RBC Count 3.03 (L) 4.40 - 5.90 10e6/uL    Hemoglobin 9.4 (L) 13.3 - 17.7 g/dL    Hematocrit 28.4 (L) 40.0 - 53.0 %    MCV 94 78 - 100 fL    MCH 31.0 26.5 - 33.0 pg    MCHC 33.1 31.5 - 36.5 g/dL    RDW 15.5 (H) 10.0 - 15.0 %    Platelet Count 279 150 - 450 10e3/uL   Asymptomatic COVID-19 Virus (Coronavirus) by PCR Oropharynx    Collection Time: 09/20/21 10:02 AM    Specimen: Oropharynx; Swab   Result Value Ref Range    SARS CoV2 PCR Negative Negative   Glucose by meter    Collection Time: 09/20/21 10:32 AM   Result Value Ref Range    GLUCOSE BY METER POCT 107 (H) 70 - 99 mg/dL   Glucose by meter    Collection Time: 09/20/21  6:07 PM   Result Value Ref Range    GLUCOSE BY  "METER POCT 141 (H) 70 - 99 mg/dL   Glucose by meter    Collection Time: 09/21/21  2:33 AM   Result Value Ref Range    GLUCOSE BY METER POCT 159 (H) 70 - 99 mg/dL   Magnesium    Collection Time: 09/21/21  6:30 AM   Result Value Ref Range    Magnesium 2.2 1.6 - 2.3 mg/dL   Basic metabolic panel    Collection Time: 09/21/21  6:30 AM   Result Value Ref Range    Sodium 129 (L) 133 - 144 mmol/L    Potassium 4.4 3.4 - 5.3 mmol/L    Chloride 93 (L) 94 - 109 mmol/L    Carbon Dioxide (CO2) 24 20 - 32 mmol/L    Anion Gap 12 3 - 14 mmol/L    Urea Nitrogen 43 (H) 7 - 30 mg/dL    Creatinine 2.69 (H) 0.66 - 1.25 mg/dL    Calcium 8.8 8.5 - 10.1 mg/dL    Glucose 157 (H) 70 - 99 mg/dL    GFR Estimate 30 (L) >60 mL/min/1.73m2   Extra Purple Top Tube    Collection Time: 09/21/21  6:53 AM   Result Value Ref Range    Hold Specimen [facility]           Physical and Psychiatric Examination:     BP (!) 92/37 (BP Location: Right arm)   Pulse 94   Temp (!) 96.2  F (35.7  C) (Oral)   Resp 17   Ht 1.676 m (5' 6\")   Wt 66.5 kg (146 lb 9.7 oz)   SpO2 94%   BMI 23.66 kg/m    Weight is 146 lbs 9.69 oz  Body mass index is 23.66 kg/m .    Physical Exam:  I have reviewed the physical exam as documented by by the medical team and agree with findings and assessment and have no additional findings to add at this time.    Mental Status Exam:  Lying quietly in the hospital bed, is well groomed, pleasant, cooperative. Speech fluent. Moves upper extremities without difficulty. Associations tight. Mood is \"OK.\"  Affect slightly restricted. Thought process logical, linear. Thought content negative for suicidal thoughts or delusions. Oriented x3.  Recent and remote memory, attention span and concentration are grossly intact. Fund of knowledge, use of language appropriate. Insight and judgment fair.              DSM-5 Diagnosis:   Unspecified anxiety disorder   Alcohol use disorder           Assessment:   I do not see evidence of depression that may contribute " "to his lack of engagement in cares. It appears to be situational, in response to numerous procedures. I don't think that use of an antidepressant would help, but perhaps short term use of a stimulant may get him going (several weeks of Ritalin 5 mg in the morning and early afternoon, increasing to 10 mg per dose as needed).           Summary of Recommendations:   Will need to be pushed to engage in PT, etc  He should be provided with resources to assist with sobriety if he is unable to stop on his own.   Page me or re-consult psychiatry as needed.     Juan Carlos Mckeon M.D.   [facility]   Contact information available via [facility] Paging/Directory.  If I am not available, then [facility] intake ([phone]) should know who   Is on call        \"This dictation was performed with voice recognition software and may contain errors,  omissions and inadvertent word substitution.\"           "

## 2021-09-21 NOTE — PLAN OF CARE
"[id number] Nursing Summary of Care    Patient Name: Dax Villareal MRN# [id number]   Age: 30 year old YOB: 1990   Date of Admission: 6/28/2021    Care delivered on September 20, 2021 from 23:00 to 07:30 on September 21, 2021       No acute events or changes noted overnight    Please refer to Flowsheets for comprehensive assessment data.     Patient receiving capnography monitoring post-operatively, with respiratory status stable receiving supplementary oxygen at rate of 1 LPM via nasal cannula with humidification.    Modified foam dressing applied over surgical site for infection prevention. Surgical site is well-approximated with no drainage. Surrounding skin is erythematous and edematous, but showing improvement overnight.    Patient resting between cares overnight, but using call light appropriately, and is able to communicate needs and preferences. Pain is managed with scheduled oxycodone and PRN hydromorphone. Patient NPO except for enteral feedings, with patient receiving Vital 1.5 through jejunostomy tube at rate of 60 mL/hr with 20 mL free water flushes performed every hour. Medications administered via gastric lumen, which has been otherwise open to gravity drainage for the majority of the shift.             Plan: Continue with current plan of care, managing post-operative pain and encouraging resumption of prior activities once medically able.       Vitals: VSS ex hypotension  o BP (!) 90/39 (BP Location: Right arm)   Pulse 97   Temp (!) 96.7  F (35.9  C) (Oral)   Resp 16   Ht 1.676 m (5' 6\")   Wt 66.5 kg (146 lb 9.7 oz)   SpO2 94%   BMI 23.66 kg/m    o BP (!) 92/37 (BP Location: Right arm)   Pulse 94   Temp (!) 96.2  F (35.7  C) (Oral)   Resp 17   Ht 1.676 m (5' 6\")   Wt 66.5 kg (146 lb 9.7 oz)   SpO2 94%   BMI 23.66 kg/m        Intake/Output Summary (Last 24 hours) at 9/21/2021 0804  Last data filed at 9/21/2021 0700  Gross per 24 hour   Intake 1090 ml   Output 940 ml   Net " 150 ml         Lines/Tubes/Drains:   PICC Triple Lumen 06/28/21 Left (Active)   Site Assessment WDL 09/20/21 2300   External Cath Length (cm) 2 cm 09/19/21 1546   Extremity Circumference (cm) 28 cm 07/12/21 1630   Dressing Intervention Chlorhexidine patch;Securing device;New dressing 09/19/21 1546   Dressing Change Due 09/26/21 09/20/21 0900   PICC Comment CDI 08/24/21 1600   Gray - Status heparin locked 09/20/21 2200   Casas - Cap Change Due 09/23/21 09/20/21 0900   Red - Status heparin locked 09/20/21 2200   Red - Cap Change Due 09/23/21 09/20/21 0900   White - Status heparin locked 09/20/21 2200   White - Cap Change Due 09/23/21 09/20/21 0900   Line Necessity Yes, meets criteria 09/20/21 1030   Number of days: 85       CVC Double Lumen Right (Active)   Site Assessment WDL 09/20/21 2300   External Cath Length (cm) 1.5 cm 09/17/21 1620   Dressing Type Chlorhexidine sponge 09/20/21 1753   Dressing Status clean;dry;intact 09/20/21 1753   Dressing Intervention new dressing 09/17/21 1620   Dressing Change Due 09/24/21 09/20/21 2300   Line Necessity yes, meets criteria 09/20/21 2300   Blue - Status saline locked;cap changed 09/20/21 1753   Blue - Cap Change Due 09/20/21 09/20/21 2300   Red - Status saline locked;cap changed 09/20/21 1753   Red - Cap Change Due 09/20/21 09/20/21 2300   Phlebitis Scale 0-->no symptoms 09/20/21 2300   Infiltration? no 07/21/21 1445   Infiltration Scale 0 07/21/21 1445   Infiltration Site Treatment Method  None 07/20/21 1210   Was a vesicant infusing? no 07/13/21 1400   CVC Comment for HD 09/20/21 1753   Number of days:        RN-Managed Electrolyte Labs  Potassium   Date Value Ref Range Status   09/19/2021 4.2 3.4 - 5.3 mmol/L Final   07/11/2021 4.6 3.4 - 5.3 mmol/L Final     Magnesium   Date Value Ref Range Status   09/20/2021 2.6 (H) 1.6 - 2.3 mg/dL Final   07/05/2021 2.3 1.6 - 2.3 mg/dL Final     Phosphorus   Date Value Ref Range Status   09/20/2021 10.5 (H) 2.5 - 4.5 mg/dL Final    07/07/2021 3.9 2.5 - 4.5 mg/dL Final       Current Facility-Administered Medications   Medication     acetaminophen (TYLENOL) solution 325 mg     sodium bicarbonate tablet 325 mg    And     amylase-lipase-protease (CREON 24) 63650-28788 units per EC capsule 1-2 capsule     amylase-lipase-protease (CREON 24) 91006-46100 units per EC capsule 2 capsule     B and C vitamin Complex with folic acid (NEPHRONEX) liquid 5 mL     benzonatate (TESSALON) capsule 100 mg     calcium carbonate (TUMS) chewable tablet 500 mg     Cetaphil Moisturizing (CETAPHIL)     cyclobenzaprine (FLEXERIL) tablet 10 mg     Daily 2 GRAM acetaminophen limit, unless fulminent liver failure or transaminases greater than or equal to 300 - 400, then none     dextrose 10% infusion     glucose gel 15-30 g    Or     dextrose 50 % injection 25-50 mL    Or     glucagon injection 1 mg     fiber modular (NUTRISOURCE FIBER) packet 1 packet     flumazenil (ROMAZICON) injection 0.2 mg     folic acid (FOLVITE) tablet 1 mg     guaiFENesin (ROBITUSSIN) 20 mg/mL solution 10 mL     heparin 10,000 units/10 mL infusion (DIALYSIS USE)     heparin lock flush 10 UNIT/ML injection 5-20 mL     heparin lock flush 10 UNIT/ML injection 5-20 mL     HYDROmorphone (PF) (DILAUDID) injection 0.5 mg     HYDROmorphone (STANDARD CONC) (DILAUDID) oral solution 1 mg     hydrOXYzine (ATARAX) tablet 25-50 mg     lidocaine (LMX4) cream     lidocaine 1 % 0.1-1 mL     melatonin tablet 6 mg     menthol (ICY HOT) 5 % patch 1 patch    And     menthol (ICY HOT) Patch in Place     naloxone (NARCAN) injection 0.2 mg    Or     naloxone (NARCAN) injection 0.4 mg    Or     naloxone (NARCAN) injection 0.2 mg    Or     naloxone (NARCAN) injection 0.4 mg     nicotine (COMMIT) lozenge 4 mg     ondansetron (ZOFRAN-ODT) ODT tab 4 mg    Or     ondansetron (ZOFRAN) injection 4 mg     oxyCODONE (ROXICODONE) solution 5 mg     pantoprazole (PROTONIX) IV push injection 40 mg     polyethylene glycol (MIRALAX)  Packet 17 g     protein modular (PROSOURCE TF Free) 1 packet     sevelamer carbonate (RENVELA) Packet 1.6 g     silver nitrate (ARZOL) Misc     sodium chloride (PF) 0.9% PF flush 10-20 mL     sodium chloride (PF) 0.9% PF flush 3 mL     sodium chloride (PF) 0.9% PF flush 3 mL     sodium chloride (PF) 0.9% PF flush 3 mL     sodium chloride (PF) 0.9% PF flush 3 mL     thiamine (B-1) tablet 100 mg    Or     thiamine (B-1) injection 100 mg     zinc oxide (DESITIN) 40 % ointment     Past Medical History:   Diagnosis Date     ADHD 2008     Past Surgical History:   Procedure Laterality Date     ENDOSCOPIC INSERTION TUBE GASTROSTOMY N/A 7/13/2021    Procedure: Upper endoscopy, INSERTION, PEG-J TUBE and Gastropexy;  Surgeon: Rayshawn Will MD;  [facility]     ENDOSCOPIC INSERTION TUBE GASTROSTOMY  9/13/2021    Procedure: PEG/J PLACEMENT.;  Surgeon: Rayshawn Will MD;  [facility]     ENDOSCOPIC ULTRASOUND UPPER GASTROINTESTINAL TRACT (GI) N/A 7/21/2021    Procedure: ENDOSCOPIC ULTRASOUND, ESOPHAGOSCOPY / UPPER GASTROINTESTINAL TRACT (GI) with cyst gastrostomy, dilation;  Surgeon: Guru Yecenia Chacko MD;  [facility]     ENDOSCOPIC ULTRASOUND, ESOPHAGOSCOPY, GASTROSCOPY, DUODENOSCOPY (EGD), NECROSECTOMY N/A 8/11/2021    Procedure: ESOPHAGOGASTRODUODENOSCOPY (EGD) with necrosectomy, stent exchange.;  Surgeon: Amadou Beasley MD;  [facility]     ESOPHAGOSCOPY, GASTROSCOPY, DUODENOSCOPY (EGD), COMBINED N/A 7/28/2021    Procedure: ESOPHAGOGASTRODUODENOSCOPY (EGD), gastrostomy apposition, Necrosectomy, stent exchange.;  Surgeon: Rayshawn Will MD;  [facility]     ESOPHAGOSCOPY, GASTROSCOPY, DUODENOSCOPY (EGD), COMBINED N/A 8/6/2021    Procedure: ESOPHAGOGASTRODUODENOSCOPY (EGD) with necrosectomy, and stents exchanged;  Surgeon: Jose Quigley MD;  [facility]     ESOPHAGOSCOPY, GASTROSCOPY, DUODENOSCOPY (EGD), COMBINED Left 9/13/2021    Procedure:  ESOPHAGOGASTRODUODENOSCOPY (EGD), GASTROSTOMY TUBE REMOVAL, FISTULAR CLOSURE ENDOSCOPIC.;  Surgeon: Rayshawn Will MD;  [facility]     IR ABSCESS TUBE CHANGE  7/14/2021     IR PARACENTESIS  7/21/2021     IR SINOGRAM INJECTION DIAGNOSTIC  8/25/2021     ORTHOPEDIC SURGERY Right     fracture repair     REPLACE GASTROSTOMY TUBE, PERCUTANEOUS N/A 7/28/2021    Procedure: gastro-jejunostomy tube exchange;  Surgeon: Rayshawn Will MD;  [facility]     No medications prior to admission.     Unresulted Labs Ordered in the Past 30 Days of this Admission       Date and Time Order Name Status Description    9/19/2021 12:21 PM Pleural fluid Aerobic Bacterial Culture Routine with Gram Stain Preliminary     8/21/2021  7:15 AM Prepare red blood cells (unit) Preliminary     8/14/2021 11:21 AM Fungus Culture, non-blood Preliminary     7/14/2021  4:30 AM Prepare red blood cells (unit) Preliminary     7/13/2021  1:16 PM Prepare red blood cells (unit) Preliminary     6/29/2021  7:53 AM Plasma prepare order unit In process             Jona Garber RN  5B General Medicine

## 2021-09-21 NOTE — PROVIDER NOTIFICATION
"[facility] 3 Cross Cover Notification    Patient Name: Dax Villareal MRN# [id number]   Age: 30 year old YOB: 1990   Date of Admission: 6/28/2021    MD notified: Dr. Jayne Olea MD    Reason for notification:   Patient does not currently have capnography orders placed. Per [facility] \"End Tidal CO2 Monitoring, Capnography\" policy, patient to receive capnography 24-hours post-operatively.     Recommendation/request given to MD:   Please confirm exclusion to policy, or submit order accordingly.   MD response:   Prompt callback and order placement      Jona Garber RN  5B General Medicine      "

## 2021-09-21 NOTE — CONSULTS
Dental Service Consultation        Dax Villareal MRN# [id number]   YOB: 1990 Age: 30 year old   Date of Admission: 6/28/2021     Reason for consult: I was asked by Dr Lozada to evaluate this patient for very poor dentition.           Assessment and Plan:   Assessment:      Extra-oral :   -Patient presents symmetry between right and left side  -No lymphadenopathy noticed.     Intra-oral:  - Patient is missing teeth #1 and #32  - Patient presents gross active decay on all his front maxillary teeth (left second premolar to right second premolar)  - Patient presents clear decay activity on first and third left mandibular teeth  - Patient presents very poor oral hygiene  - Patient presents sensitivity to thermal change on all his teeth (patient puts ice in his mouth to relief from pain)  - Patient do not present intra-oral swelling  - Patient presents plaque and tartar on all teeth  - Clinical crown of tooth #16 is broken  - Patient presents lesion on left palate (10mm x 5mm purple lesion on the left palate) due to the previous intubation  - Patient presents crust on dorsal side of the tongue due to the previous intubation causing it to dry.       Radiology:  - Apical radiolucency at apex of tooth #16 (left third mandibular molar)  - Light Generalized loss of bone around all present teeth (upper and lower arch)  - Patient presents clear decay activity on all front maxillary teeth  - Patient presents clear decay activity on first and third left mandibular teeth     Plan:  -Extraction of left third maxillary molar recommended  -Further assessment of front maxillary teeth in the dental clinic to decide which to restore and which to extract. Most probably many front maxillary teeth will need to be extracted.   - Patient needs to skip one dose of heparin before being seen in clinic if possible.  -After discussion with healthcare team, [place] plan would be to see the patient at the [facility] whenever he is  medically stable.   - Healthcare team has been notified of the findings and further steps to take to schedule the patient at the [facility] dental [facility] : Healthcare team can call the clinic to take an appointment.     All information have been communicated to the patient and to healthcare team.     For further information  Clinic information:   [facility]   [address], [place] [address]  [phone]       Chief Complaint:   You just woke me up from my sleep. Me teeth hurt and it gets better when I put ice in my mouth.    History is obtained from the patient         History of Present Illness:   Dax Villareal is a 30 year old male with medical history significant for alcohol use disorder who has [facility] [place] for acute alcohol hepatitis/end stage liver disease complicated by hepatic encephalopathy, acute renal failure requiring hemodialysis, acute hypoxemic respiratory failure and septic shock secondary to acute necrotizing pancreatitis. Transferred to [facility] ICU on 6/28/2021 for further management of necrotizing pancreatitis by interventional GI - necrosectomy.                Past Medical History:     Past Medical History:   Diagnosis Date     ADHD 2008             Past Surgical History:     Past Surgical History:   Procedure Laterality Date     COMBINED ESOPHAGOSCOPY, GASTROSCOPY, DUODENOSCOPY (EGD), TISSUE APPOSI N/A 9/20/2021    Procedure: ESOPHAGOGASTRODUODENOSCOPY WITH SUTURE FISTULA CLOSURE, argon plasma coagulation;  Surgeon: Jose Quigley MD;  Location: [facility] OR     ENDOSCOPIC INSERTION TUBE GASTROSTOMY N/A 7/13/2021    Procedure: Upper endoscopy, INSERTION, PEG-J TUBE and Gastropexy;  Surgeon: Rayshawn Will MD;  Location: [facility] OR     ENDOSCOPIC INSERTION TUBE GASTROSTOMY  9/13/2021    Procedure: PEG/J PLACEMENT.;  Surgeon: Rayshawn Will MD;  Location: [facility] OR     ENDOSCOPIC ULTRASOUND UPPER  "GASTROINTESTINAL TRACT (GI) N/A 7/21/2021    Procedure: ENDOSCOPIC ULTRASOUND, ESOPHAGOSCOPY / UPPER GASTROINTESTINAL TRACT (GI) with cyst gastrostomy, dilation;  Surgeon: Guru Yecenia Chacko MD;  Location: [facility] OR     ENDOSCOPIC ULTRASOUND, ESOPHAGOSCOPY, GASTROSCOPY, DUODENOSCOPY (EGD), NECROSECTOMY N/A 8/11/2021    Procedure: ESOPHAGOGASTRODUODENOSCOPY (EGD) with necrosectomy, stent exchange.;  Surgeon: Amadou Beasley MD;  Location: [facility] OR     ESOPHAGOSCOPY, GASTROSCOPY, DUODENOSCOPY (EGD), COMBINED N/A 7/28/2021    Procedure: ESOPHAGOGASTRODUODENOSCOPY (EGD), gastrostomy apposition, Necrosectomy, stent exchange.;  Surgeon: Rayshawn Will MD;  [facility] OR     ESOPHAGOSCOPY, GASTROSCOPY, DUODENOSCOPY (EGD), COMBINED N/A 8/6/2021    Procedure: ESOPHAGOGASTRODUODENOSCOPY (EGD) with necrosectomy, and stents exchanged;  Surgeon: Jose Quigley MD;  Location[facility] OR     ESOPHAGOSCOPY, GASTROSCOPY, DUODENOSCOPY (EGD), COMBINED Left 9/13/2021    Procedure: ESOPHAGOGASTRODUODENOSCOPY (EGD), GASTROSTOMY TUBE REMOVAL, FISTULAR CLOSURE ENDOSCOPIC.;  Surgeon: Rayshawn Will MD;  Location: [facility] OR     IR ABSCESS TUBE CHANGE  7/14/2021     IR PARACENTESIS  7/21/2021     IR SINOGRAM INJECTION DIAGNOSTIC  8/25/2021     ORTHOPEDIC SURGERY Right     fracture repair     REPLACE GASTROSTOMY TUBE, PERCUTANEOUS N/A 7/28/2021    Procedure: gastro-jejunostomy tube exchange;  Surgeon: Rayshawn Will MD;  Location: [facility] OR               Social History:     Social History     Tobacco Use     Smoking status: Current Every Day Smoker     Packs/day: 0.50     Types: Cigarettes     Smokeless tobacco: Never Used     Tobacco comment: 8/24/2021 Patient did not want to discuss quitting but would like 4 mg lozenge to use during admission. Patient also accepted \"Quitting for Good\" workbook   Substance Use Topics     Alcohol use: Yes             Family History:     Family History   Problem Relation Age of " Onset     Attention Deficit Disorder Mother      Alcoholism Mother      Alcoholism Father      Alcoholism Brother      Alcoholism Sister              Immunizations:     Immunization History   Administered Date(s) Administered     FLU 6-35 months 11/05/2019     Historical DTP/aP 01/08/1990, 06/19/1990, 08/13/1990, 05/14/1991, 10/05/1994     Influenza Intranasal Vaccine 10/05/2010     MMR 05/14/1991     Polio, Unspecified  01/08/1990, 06/19/1990, 05/14/1991, 10/05/1994             Allergies:     Allergies   Allergen Reactions     Tylenol [Acetaminophen] Itching             Medications:     Current Facility-Administered Medications Ordered in Epic   Medication Dose Route Frequency Last Rate Last Admin     acetaminophen (TYLENOL) solution 325 mg  325 mg Oral or PEGJ tube Q4H PRN         sodium bicarbonate tablet 325 mg  325 mg Per Feeding Tube Q4H   325 mg at 09/21/21 1247    And     amylase-lipase-protease (CREON 24) 63161-84555 units per EC capsule 1-2 capsule  1-2 capsule Per Feeding Tube Q4H   2 capsule at 09/21/21 1247     amylase-lipase-protease (CREON 24) 07695-26884 units per EC capsule 2 capsule  2 capsule Per G Tube TID w/meals         B and C vitamin Complex with folic acid (NEPHRONEX) liquid 5 mL  5 mL Per Feeding Tube Daily   5 mL at 09/21/21 0847     benzonatate (TESSALON) capsule 100 mg  100 mg Oral TID PRN   100 mg at 09/12/21 0100     calcium carbonate (TUMS) chewable tablet 500 mg  500 mg Oral TID PRN   500 mg at 09/19/21 1312     Cetaphil Moisturizing (CETAPHIL)   Topical BID PRN         cyclobenzaprine (FLEXERIL) tablet 10 mg  10 mg Oral Daily PRN   10 mg at 09/17/21 0814     Daily 2 GRAM acetaminophen limit, unless fulminent liver failure or transaminases greater than or equal to 300 - 400, then none   Does not apply Continuous PRN         dextrose 10% infusion   Intravenous Continuous PRN         glucose gel 15-30 g  15-30 g Oral Q15 Min PRN        Or     dextrose 50 % injection 25-50 mL  25-50 mL  Intravenous Q15 Min PRN        Or     glucagon injection 1 mg  1 mg Subcutaneous Q15 Min PRN         fiber modular (NUTRISOURCE FIBER) packet 1 packet  1 packet Per Feeding Tube TID   1 packet at 09/21/21 1302     folic acid (FOLVITE) tablet 1 mg  1 mg Oral or Feeding Tube Daily   1 mg at 09/21/21 0847     guaiFENesin (ROBITUSSIN) 20 mg/mL solution 10 mL  10 mL Oral Q4H PRN   10 mL at 09/06/21 1613     heparin 10,000 units/10 mL infusion (DIALYSIS USE)  1,000 Units/hr Hemodialysis Machine Continuous 1 mL/hr at 08/24/21 0907 1,000 Units/hr at 08/24/21 0907     heparin lock flush 10 UNIT/ML injection 5-20 mL  5-20 mL Intracatheter Q24H   15 mL at 09/20/21 2115     heparin lock flush 10 UNIT/ML injection 5-20 mL  5-20 mL Intracatheter Q1H PRN   5 mL at 09/21/21 0625     HYDROmorphone (PF) (DILAUDID) injection 0.5 mg  0.5 mg Intravenous Q6H PRN         HYDROmorphone (STANDARD CONC) (DILAUDID) oral solution 1 mg  1 mg Oral or PEGJ tube Q4H PRN   1 mg at 09/21/21 0715     hydrOXYzine (ATARAX) tablet 25-50 mg  25-50 mg Oral or Feeding Tube TID PRN   50 mg at 09/21/21 1025     lidocaine (LMX4) cream   Topical Q1H PRN         lidocaine 1 % 0.1-1 mL  0.1-1 mL Other Q1H PRN         melatonin tablet 6 mg  6 mg Oral or Feeding Tube At Bedtime   6 mg at 09/21/21 0104     menthol (ICY HOT) 5 % patch 1 patch  1 patch Topical Q8H PRN   1 patch at 08/15/21 1545    And     menthol (ICY HOT) Patch in Place   Transdermal Q8H         methylphenidate (RITALIN) tablet 5 mg  5 mg Oral BID         naloxone (NARCAN) injection 0.2 mg  0.2 mg Intravenous Q2 Min PRN        Or     naloxone (NARCAN) injection 0.4 mg  0.4 mg Intravenous Q2 Min PRN        Or     naloxone (NARCAN) injection 0.2 mg  0.2 mg Intramuscular Q2 Min PRN        Or     naloxone (NARCAN) injection 0.4 mg  0.4 mg Intramuscular Q2 Min PRN         nicotine (COMMIT) lozenge 4 mg  4 mg Buccal Q1H PRN   4 mg at 08/25/21 0912     ondansetron (ZOFRAN-ODT) ODT tab 4 mg  4 mg Oral Q6H  PRN   4 mg at 09/12/21 0212    Or     ondansetron (ZOFRAN) injection 4 mg  4 mg Intravenous Q6H PRN   4 mg at 09/21/21 0746     oxyCODONE (ROXICODONE) solution 5 mg  5 mg Oral or Feeding Tube Q8H   5 mg at 09/21/21 1247     pantoprazole (PROTONIX) IV push injection 40 mg  40 mg Intravenous Daily with breakfast   40 mg at 09/21/21 0746     polyethylene glycol (MIRALAX) Packet 17 g  17 g Oral BID PRN         protein modular (PROSOURCE TF Free) 1 packet  1 packet Per Feeding Tube Daily   1 packet at 09/21/21 0847     sevelamer carbonate (RENVELA) Packet 1.6 g  1.6 g Oral or Feeding Tube TID   1.6 g at 09/21/21 0846     silver nitrate (ARZOL) Misc   Topical Daily PRN         sodium chloride (PF) 0.9% PF flush 10-20 mL  10-20 mL Intracatheter Q1H PRN   10 mL at 09/21/21 0625     sodium chloride (PF) 0.9% PF flush 3 mL  3 mL Intracatheter q1 min prn   20 mL at 09/15/21 0539     sodium chloride (PF) 0.9% PF flush 3 mL  3 mL Intracatheter q1 min prn         sodium chloride (PF) 0.9% PF flush 3 mL  3 mL Intracatheter q1 min prn   20 mL at 08/28/21 0534     sodium chloride (PF) 0.9% PF flush 3 mL  3 mL Intracatheter q1 min prn   3 mL at 08/31/21 0227     thiamine (B-1) tablet 100 mg  100 mg Oral or Feeding Tube Daily   100 mg at 09/21/21 0846    Or     thiamine (B-1) injection 100 mg  100 mg Intravenous Daily         zinc oxide (DESITIN) 40 % ointment   Topical Q1H PRN         No current [facility]-ordered outpatient medications on file.             Review of Systems:     The 10 point Review of Systems is negative other than noted in the HPI            Physical Exam:   Vitals were reviewed  Temp: (!) 96.1  F (35.6  C) Temp src: Oral BP: 100/44 Pulse: 97   Resp: 16 SpO2: 95 % O2 Device: Nasal cannula Oxygen Delivery: 1 LPM       HEENT: NC/AT, EOMI, PERRL,no lymphadenopathy observed, no induration or erythema, patient presents lesion on left palate (10mm x 5mm purple lesion on the left palate) due to the previous intubation and  patient presents crust on dorsal side of the tongue due to the previous intubation causing it to dry, inferior border of mandible is palpable bilaterally. REYMUNDO >45mm. No TMJ discomfort bilaterally.    Intra-oral exam: OP clear, uvula midline,poor oral hygiene, no edema, FOM is flat bilaterally, patient presents gross active decay on ALL present teeth.     Patient denies having difficulty breathing, swallowing, talking and eating.         Data:   Radiographic interpretation: Dental CT taken on 9/20/21  Osseous pathology: None  Pulpal Pathology: needs further examination to be able to assess situation of every single tooth  Periodontal Pathology: Generalized Moderate Chronic Periodontitis  Caries: On all existing dentition  Odontogenic pathology: None    The patient was discussed with: Dr Alisa Marrufo, DMD  PGY1  Pager: [phone]

## 2021-09-21 NOTE — PROVIDER NOTIFICATION
MED [facility] 3 Cross Cover Notification    Patient Name: Dax Villareal MRN# [id number]   Age: 30 year old YOB: 1990   Date of Admission: 6/28/2021    MD notified: Dr. Jayne Olea MD    Reason for notification:   Gastric residual increasing with minimal drainage via gravity. GI recommending low-intermittent suction per 9/20 note.  Recommendation/request given to MD:   Please confirm whether gravity drainage acceptable/appropriate.   MD response:   MD states patient to be placed on low intermittent suctioning, per GI recommendation.       Jona Garber RN  5B General Medicine

## 2021-09-22 LAB
ANION GAP SERPL CALCULATED.3IONS-SCNC: 15 MMOL/L (ref 3–14)
BUN SERPL-MCNC: 71 MG/DL (ref 7–30)
CALCIUM SERPL-MCNC: 9.5 MG/DL (ref 8.5–10.1)
CHLORIDE BLD-SCNC: 88 MMOL/L (ref 94–109)
CO2 SERPL-SCNC: 23 MMOL/L (ref 20–32)
CREAT SERPL-MCNC: 3.72 MG/DL (ref 0.66–1.25)
CRP SERPL-MCNC: 56 MG/L (ref 0–8)
ERYTHROCYTE [DISTWIDTH] IN BLOOD BY AUTOMATED COUNT: 15.5 % (ref 10–15)
GFR SERPL CREATININE-BSD FRML MDRD: 20 ML/MIN/1.73M2
GLUCOSE BLD-MCNC: 104 MG/DL (ref 70–99)
HCT VFR BLD AUTO: 28 % (ref 40–53)
HGB BLD-MCNC: 9 G/DL (ref 13.3–17.7)
LACTATE SERPL-SCNC: 2 MMOL/L (ref 0.7–2)
MAGNESIUM SERPL-MCNC: 2.3 MG/DL (ref 1.6–2.3)
MCH RBC QN AUTO: 30.4 PG (ref 26.5–33)
MCHC RBC AUTO-ENTMCNC: 32.1 G/DL (ref 31.5–36.5)
MCV RBC AUTO: 95 FL (ref 78–100)
PHOSPHATE SERPL-MCNC: 8.3 MG/DL (ref 2.5–4.5)
PLATELET # BLD AUTO: 260 10E3/UL (ref 150–450)
POTASSIUM BLD-SCNC: 4.6 MMOL/L (ref 3.4–5.3)
PROVATION GI EXAM: NORMAL
RBC # BLD AUTO: 2.96 10E6/UL (ref 4.4–5.9)
SODIUM SERPL-SCNC: 126 MMOL/L (ref 133–144)
WBC # BLD AUTO: 8.8 10E3/UL (ref 4–11)

## 2021-09-22 PROCEDURE — 250N000011 HC RX IP 250 OP 636: Performed by: CLINICAL NURSE SPECIALIST

## 2021-09-22 PROCEDURE — 250N000013 HC RX MED GY IP 250 OP 250 PS 637

## 2021-09-22 PROCEDURE — 250N000013 HC RX MED GY IP 250 OP 250 PS 637: Performed by: INTERNAL MEDICINE

## 2021-09-22 PROCEDURE — 36592 COLLECT BLOOD FROM PICC: CPT

## 2021-09-22 PROCEDURE — 250N000013 HC RX MED GY IP 250 OP 250 PS 637: Performed by: HOSPITALIST

## 2021-09-22 PROCEDURE — 86140 C-REACTIVE PROTEIN: CPT | Performed by: INTERNAL MEDICINE

## 2021-09-22 PROCEDURE — 250N000011 HC RX IP 250 OP 636

## 2021-09-22 PROCEDURE — 90937 HEMODIALYSIS REPEATED EVAL: CPT

## 2021-09-22 PROCEDURE — 83605 ASSAY OF LACTIC ACID: CPT | Performed by: STUDENT IN AN ORGANIZED HEALTH CARE EDUCATION/TRAINING PROGRAM

## 2021-09-22 PROCEDURE — 85027 COMPLETE CBC AUTOMATED: CPT

## 2021-09-22 PROCEDURE — 84100 ASSAY OF PHOSPHORUS: CPT

## 2021-09-22 PROCEDURE — 120N000002 HC R&B MED SURG/OB UMMC

## 2021-09-22 PROCEDURE — P9041 ALBUMIN (HUMAN),5%, 50ML: HCPCS

## 2021-09-22 PROCEDURE — 250N000013 HC RX MED GY IP 250 OP 250 PS 637: Performed by: STUDENT IN AN ORGANIZED HEALTH CARE EDUCATION/TRAINING PROGRAM

## 2021-09-22 PROCEDURE — C9113 INJ PANTOPRAZOLE SODIUM, VIA: HCPCS | Performed by: INTERNAL MEDICINE

## 2021-09-22 PROCEDURE — 80048 BASIC METABOLIC PNL TOTAL CA: CPT | Performed by: CLINICAL NURSE SPECIALIST

## 2021-09-22 PROCEDURE — 36592 COLLECT BLOOD FROM PICC: CPT | Performed by: STUDENT IN AN ORGANIZED HEALTH CARE EDUCATION/TRAINING PROGRAM

## 2021-09-22 PROCEDURE — 999N000197 HC STATISTIC WOC PT EDUCATION, 0-15 MIN

## 2021-09-22 PROCEDURE — 83735 ASSAY OF MAGNESIUM: CPT | Performed by: INTERNAL MEDICINE

## 2021-09-22 PROCEDURE — 250N000013 HC RX MED GY IP 250 OP 250 PS 637: Performed by: PHYSICIAN ASSISTANT

## 2021-09-22 PROCEDURE — 99233 SBSQ HOSP IP/OBS HIGH 50: CPT | Mod: GC | Performed by: STUDENT IN AN ORGANIZED HEALTH CARE EDUCATION/TRAINING PROGRAM

## 2021-09-22 PROCEDURE — 258N000003 HC RX IP 258 OP 636: Performed by: CLINICAL NURSE SPECIALIST

## 2021-09-22 PROCEDURE — 99233 SBSQ HOSP IP/OBS HIGH 50: CPT | Performed by: CLINICAL NURSE SPECIALIST

## 2021-09-22 PROCEDURE — 250N000011 HC RX IP 250 OP 636: Performed by: INTERNAL MEDICINE

## 2021-09-22 PROCEDURE — P9047 ALBUMIN (HUMAN), 25%, 50ML: HCPCS

## 2021-09-22 RX ORDER — MIDODRINE HYDROCHLORIDE 5 MG/1
5 TABLET ORAL
Status: DISCONTINUED | OUTPATIENT
Start: 2021-09-22 | End: 2021-09-23

## 2021-09-22 RX ORDER — HEPARIN SODIUM 1000 [USP'U]/ML
500 INJECTION, SOLUTION INTRAVENOUS; SUBCUTANEOUS CONTINUOUS
Status: DISCONTINUED | OUTPATIENT
Start: 2021-09-22 | End: 2021-09-22

## 2021-09-22 RX ORDER — MIDODRINE HYDROCHLORIDE 5 MG/1
5 TABLET ORAL
Status: DISCONTINUED | OUTPATIENT
Start: 2021-09-22 | End: 2021-09-22

## 2021-09-22 RX ORDER — ALBUMIN, HUMAN INJ 5% 5 %
25 SOLUTION INTRAVENOUS ONCE
Status: COMPLETED | OUTPATIENT
Start: 2021-09-22 | End: 2021-09-22

## 2021-09-22 RX ORDER — AMOXICILLIN 400 MG/5ML
500 POWDER, FOR SUSPENSION ORAL EVERY 24 HOURS
Status: COMPLETED | OUTPATIENT
Start: 2021-09-22 | End: 2021-09-28

## 2021-09-22 RX ORDER — ALBUMIN (HUMAN) 12.5 G/50ML
SOLUTION INTRAVENOUS
Status: DISCONTINUED
Start: 2021-09-22 | End: 2021-09-23 | Stop reason: HOSPADM

## 2021-09-22 RX ORDER — HEPARIN SODIUM 1000 [USP'U]/ML
500 INJECTION, SOLUTION INTRAVENOUS; SUBCUTANEOUS
Status: COMPLETED | OUTPATIENT
Start: 2021-09-22 | End: 2021-09-22

## 2021-09-22 RX ADMIN — SODIUM BICARBONATE 325 MG: 325 TABLET ORAL at 03:54

## 2021-09-22 RX ADMIN — CYCLOBENZAPRINE 10 MG: 10 TABLET, FILM COATED ORAL at 16:14

## 2021-09-22 RX ADMIN — ALBUMIN HUMAN 25 G: 0.05 INJECTION, SOLUTION INTRAVENOUS at 19:34

## 2021-09-22 RX ADMIN — MIDODRINE HYDROCHLORIDE 5 MG: 5 TABLET ORAL at 18:54

## 2021-09-22 RX ADMIN — Medication 1 PACKET: at 09:54

## 2021-09-22 RX ADMIN — CALCIUM CARBONATE (ANTACID) CHEW TAB 500 MG 500 MG: 500 CHEW TAB at 14:53

## 2021-09-22 RX ADMIN — HYDROMORPHONE HYDROCHLORIDE 1 MG: 1 SOLUTION ORAL at 07:41

## 2021-09-22 RX ADMIN — Medication 1 PACKET: at 16:17

## 2021-09-22 RX ADMIN — SODIUM BICARBONATE 325 MG: 325 TABLET ORAL at 08:11

## 2021-09-22 RX ADMIN — HYDROMORPHONE HYDROCHLORIDE 1 MG: 1 SOLUTION ORAL at 23:31

## 2021-09-22 RX ADMIN — Medication 1 PACKET: at 20:20

## 2021-09-22 RX ADMIN — ACETAMINOPHEN 325 MG: 325 SOLUTION ORAL at 11:25

## 2021-09-22 RX ADMIN — PANCRELIPASE 2 CAPSULE: 24000; 76000; 120000 CAPSULE, DELAYED RELEASE PELLETS ORAL at 03:54

## 2021-09-22 RX ADMIN — HYDROMORPHONE HYDROCHLORIDE 1 MG: 1 SOLUTION ORAL at 19:32

## 2021-09-22 RX ADMIN — SODIUM BICARBONATE 325 MG: 325 TABLET ORAL at 16:15

## 2021-09-22 RX ADMIN — OXYCODONE HYDROCHLORIDE 5 MG: 5 SOLUTION ORAL at 12:10

## 2021-09-22 RX ADMIN — AMOXICILLIN 500 MG: 400 POWDER, FOR SUSPENSION ORAL at 05:45

## 2021-09-22 RX ADMIN — SODIUM CHLORIDE 300 ML: 9 INJECTION, SOLUTION INTRAVENOUS at 13:03

## 2021-09-22 RX ADMIN — SEVELAMER CARBONATE 1.6 G: 800 POWDER, FOR SUSPENSION ORAL at 17:02

## 2021-09-22 RX ADMIN — OXYCODONE HYDROCHLORIDE 5 MG: 5 SOLUTION ORAL at 21:16

## 2021-09-22 RX ADMIN — ONDANSETRON 4 MG: 2 INJECTION INTRAMUSCULAR; INTRAVENOUS at 06:22

## 2021-09-22 RX ADMIN — OXYCODONE HYDROCHLORIDE 5 MG: 5 SOLUTION ORAL at 03:55

## 2021-09-22 RX ADMIN — Medication 6 MG: at 00:06

## 2021-09-22 RX ADMIN — Medication 5 ML: at 16:15

## 2021-09-22 RX ADMIN — SODIUM BICARBONATE 325 MG: 325 TABLET ORAL at 00:26

## 2021-09-22 RX ADMIN — SODIUM BICARBONATE 325 MG: 325 TABLET ORAL at 12:10

## 2021-09-22 RX ADMIN — ONDANSETRON 4 MG: 2 INJECTION INTRAMUSCULAR; INTRAVENOUS at 12:10

## 2021-09-22 RX ADMIN — PANTOPRAZOLE SODIUM 40 MG: 40 INJECTION, POWDER, FOR SOLUTION INTRAVENOUS at 09:54

## 2021-09-22 RX ADMIN — HYDROMORPHONE HYDROCHLORIDE 1 MG: 1 SOLUTION ORAL at 13:53

## 2021-09-22 RX ADMIN — HYDROMORPHONE HYDROCHLORIDE 1 MG: 1 SOLUTION ORAL at 00:27

## 2021-09-22 RX ADMIN — HYDROXYZINE HYDROCHLORIDE 50 MG: 25 TABLET, FILM COATED ORAL at 10:10

## 2021-09-22 RX ADMIN — HEPARIN SODIUM 500 UNITS: 1000 INJECTION INTRAVENOUS; SUBCUTANEOUS at 13:06

## 2021-09-22 RX ADMIN — METHYLPHENIDATE HYDROCHLORIDE 5 MG: 5 TABLET ORAL at 09:53

## 2021-09-22 RX ADMIN — FOLIC ACID 1 MG: 1 TABLET ORAL at 09:54

## 2021-09-22 RX ADMIN — THIAMINE HCL TAB 100 MG 100 MG: 100 TAB at 09:55

## 2021-09-22 RX ADMIN — PANCRELIPASE 2 CAPSULE: 24000; 76000; 120000 CAPSULE, DELAYED RELEASE PELLETS ORAL at 20:20

## 2021-09-22 RX ADMIN — ACETAMINOPHEN 325 MG: 325 SOLUTION ORAL at 16:14

## 2021-09-22 RX ADMIN — PANCRELIPASE 2 CAPSULE: 24000; 76000; 120000 CAPSULE, DELAYED RELEASE PELLETS ORAL at 00:26

## 2021-09-22 RX ADMIN — METHYLPHENIDATE HYDROCHLORIDE 5 MG: 5 TABLET ORAL at 16:16

## 2021-09-22 RX ADMIN — HYDROXYZINE HYDROCHLORIDE 50 MG: 25 TABLET, FILM COATED ORAL at 16:14

## 2021-09-22 RX ADMIN — ACETAMINOPHEN 325 MG: 325 SOLUTION ORAL at 22:48

## 2021-09-22 RX ADMIN — Medication 5 ML: at 09:53

## 2021-09-22 RX ADMIN — HYDROMORPHONE HYDROCHLORIDE 0.5 MG: 1 INJECTION, SOLUTION INTRAMUSCULAR; INTRAVENOUS; SUBCUTANEOUS at 17:45

## 2021-09-22 RX ADMIN — SEVELAMER CARBONATE 1.6 G: 800 POWDER, FOR SUSPENSION ORAL at 23:31

## 2021-09-22 RX ADMIN — SODIUM CHLORIDE 250 ML: 9 INJECTION, SOLUTION INTRAVENOUS at 13:04

## 2021-09-22 RX ADMIN — SODIUM BICARBONATE 325 MG: 325 TABLET ORAL at 20:20

## 2021-09-22 RX ADMIN — AMOXICILLIN 500 MG: 400 POWDER, FOR SUSPENSION ORAL at 17:02

## 2021-09-22 RX ADMIN — PANCRELIPASE 2 CAPSULE: 24000; 76000; 120000 CAPSULE, DELAYED RELEASE PELLETS ORAL at 12:10

## 2021-09-22 RX ADMIN — PANCRELIPASE 2 CAPSULE: 24000; 76000; 120000 CAPSULE, DELAYED RELEASE PELLETS ORAL at 16:15

## 2021-09-22 RX ADMIN — PANCRELIPASE 2 CAPSULE: 24000; 76000; 120000 CAPSULE, DELAYED RELEASE PELLETS ORAL at 08:14

## 2021-09-22 RX ADMIN — SEVELAMER CARBONATE 1.6 G: 800 POWDER, FOR SUSPENSION ORAL at 09:54

## 2021-09-22 RX ADMIN — HYDROXYZINE HYDROCHLORIDE 50 MG: 25 TABLET, FILM COATED ORAL at 21:16

## 2021-09-22 RX ADMIN — HEPARIN SODIUM 500 UNITS/HR: 1000 INJECTION INTRAVENOUS; SUBCUTANEOUS at 13:05

## 2021-09-22 RX ADMIN — Medication 6 MG: at 23:31

## 2021-09-22 ASSESSMENT — ACTIVITIES OF DAILY LIVING (ADL)
ADLS_ACUITY_SCORE: 18
ADLS_ACUITY_SCORE: 14
ADLS_ACUITY_SCORE: 22
ADLS_ACUITY_SCORE: 18

## 2021-09-22 ASSESSMENT — MIFFLIN-ST. JEOR: SCORE: 1582.75

## 2021-09-22 NOTE — PLAN OF CARE
"Assumed cares [id number]. Pt was checked hourly on shift    /83 (BP Location: Right arm)   Pulse 98   Temp 97.2  F (36.2  C) (Oral)   Resp 18   Ht 1.676 m (5' 6\")   Wt 66 kg (145 lb 8.1 oz)   SpO2 96%   BMI 23.48 kg/m      Pt A&O x4; lethargic during  shift; arouses easily. VSS on 2L NC. Pt denies SOB and chest pain. C/O generalized body pain; oral dilaudid & oxycodone given x1. Dressing on old GJ tube; C/D/I;  Pt refused dressing change. New GJ tube in-place; Jtube infusing new TF formula (Novasource renal) with NiBicarb & Creon; started at 1900 ; infusing at 25mL/hr; increase to 10mL/hr q8h; next increase at 0300; GR at 45mL/hr. G-tube on LIS; adequate output. Pt refused cleaning around new GJ tube site. No urine output on shift. No BM. Triple lumen PICC in-place; flushed & HL. CVC in-place. Pt refused repositioning. Appears to be resting between cares.      Plan of Care: Continuing monitoring patient and notify MD of any changes.    Erna Eubanks RN on 9/21/2021 at 10:59 PM      "

## 2021-09-22 NOTE — PLAN OF CARE
"Care assumed: [id number]   Vitals  Pain  /50 (BP Location: Right arm)   Pulse 101   Temp (!) 96.1  F (35.6  C) (Oral)   Resp 16   Ht 1.676 m (5' 6\")   Wt 66 kg (145 lb 8.1 oz)   SpO2 96% 2LPM   BMI 23.48 kg/m      Dilaudid available for pain in abdomen    Activity  Lift    Neuro/Mood  A&Ox 4     Denies numbness and tingling    Cardiac  No signs of acute distress    Respiratory  No signs of acute respiratory distress    GI/  PT on Hemodialysis .  Incontinent to bowel      Diet/ Nutrition  NPO ex ice chips      Skin, Wounds, LDAs REJI skin; Pt refuses both assessment and repositioning   PICC triple lumen   CVC for dialysis   Peg tube; G to suction J for feeding.   Tube feeds at 35mL/hr. Change to 45 goal rate at 1200    Plan of Care  Other notes Follow POC         "

## 2021-09-22 NOTE — CONSULTS
WO Nurse Inpatient Wound Assessment   Reason for consultation: Evaluate and treat Old GJ site wounds  9/22- New consult for future skin breakdown on current G-tube site  Assessment  abdominal wounds due to Surgical Wound- no drainage this tract was surgically closed on 9/20. Currently covered with Mepilex and no drainage  Status: healing  G-tube site- noted crusty dry drainage around the tube but pt declined to have WOC assess due to pain despite receiving pain medication an hour prior to WOC assessment. Spoke with RN who verified no open wound but dry drainage and pt declining cleansing at times. Recommended to use soaking technique for at least 10 mins prior to dressing removal and apply no sting film barrier to prevent skin breakdown.  Treatment Plan  Old GI tube site - Dsg per gastroenterology team  G- tube site- Instill 10-20 ml of NS into the current gauze before removal. Let it sit for about 10 mins to soak the dressing and easy removal to minimize pain.  Gently remove the dressing and cleanse with NS, then pat dry.  Paint with no sting film barrier to protect skin, apply two layers of barrier.  Place one piece of fenestrated gauze.  Secure the tube.      Orders updated and spoke with RN   Recommended provider order: recommend a liquid creatinine to check to see if that is urine or not.   Paged out attending and nephrology team.   WO Nurse follow-up plan:weekly  Nursing to notify the Provider(s) and re-consult the WOC Nurse if wound(s) deteriorates or new skin concern.    Patient History  According to provider note(s):  Dax Villareal is a 30 year old male with PMHx significant for alcohol use disorder who has had a prolonged hospitalization at [facility] in [place] for acute alcohol hepatitis/end stage liver disease complicated by hepatic encephalopathy, acute renal failure requiring hemodialysis, acute hypoxemic respiratory failure and septic shock secondary to acute necrotizing pancreatitis.  Transferred to [facility] ICU on 6/28/2021 for further management of necrotizing pancreatitis by interventional GI, possible necrosectomy.    Objective Data  Containment of urine/stool: Incontinence Protocol    Active Diet Order  Orders Placed This Encounter      NPO for Medical/Clinical Reasons Except for: NPO but receiving PN, Ice Chips      Output:   I/O last 3 completed shifts:  In: 1753 [NG/GT:908]  Out: 1350 [Emesis/NG output:1350]    Risk Assessment:   Sensory Perception: 2-->very limited  Moisture: 3-->occasionally moist  Activity: 2-->chairfast  Mobility: 2-->very limited  Nutrition: 3-->adequate  Friction and Shear: 1-->problem  Tyrel Score: 13                          Labs:   Recent Labs   Lab 09/22/21  0527 09/20/21  0537 09/19/21  1247   HGB 9.0*   < >  --    INR  --   --  1.51*   WBC 8.8   < >  --    CRP 56.0*  --   --     < > = values in this interval not displayed.       Physical Exam  Areas of skin assessed: focused old G-tube site     Wound Location:  Right upper quadrant  Date of last photo none taken today- pt declined  Wound History: New G-tube site      Interventions  Visual inspection and assessment completed   Wound Care Rationale Protect periwound skin, Provide protection  and Pain reduction  Wound Care: completed by RN  Supplies: floor stock and discussed with RN  Current off-loading measures: Pillows under calves and Pillows  Current support surface: Standard  Atmos Air mattress  Education provided to: plan of care encouraged RN to connect to ramos bag to avoid the pouch overfilling. s he said she is going to.  Discussed plan of care with Patient and Nurse    Deneen Matthews RN CWOCN

## 2021-09-22 NOTE — PROGRESS NOTES
Gastroenterology Inpatient Sign Off Note    30 year old male with a history of alcohol use disorder who presented to OSH 5/19 for abdominal pain, nausea and vomiting, and found to have alcohol hepatitis, ESLD on HD, septic shock requiring pressors as well as necrotizing pancreatitis with large evolving necrotic collection. Transferred to [facility] for drainage of presumed infected necrotic collection. S/p EUS transluminal and percutaneous drainage. GI re-consulted management of feeding tube (PEG/PEGJ).      # Poor nutrition s/p PEG-J placement  # Severe malnutrition in the context of chronic illness  GI renotified 9/13 for bulging G-tube. Found to be displaced G-tube with balloon approaching the skin causing pain. Last week has tried pushing in, but failed. Thus, underwent G-tube removal and putting in new PEGJ tube at the new site 9/13/21. Patient has large fistula with draining from the old G-tube site and underwent endoscopic closure with APC and suture gastropexy (9/20/21). No leakage post suture but will have to continue to observe the leakage in the long team.     # Necrotizing pancreatitis, related to alcohol use  Supportive care per primary team. No GI intervention required at this time for pancreatitis.     Recommendations:  - NPO for 1 week and G tube to LIS for 1 week (until 9/27/21) for fistula to heal  - Continue TF via jejunostomy tube   - Monitor fistula site, apply daily dressing and notify service with any recurrent output.   - For new PEGJ, the three white buttons along the gastrostomy represent the external bumpers of the T tags and these should be removed by cutting the underlying blue strings in two weeks (9/28/21)  - Continue wound care as per WOCN    Inpatient GI consults service will sign off. No further recommendations at this time. If primary team has addition questions, please page consult fellow listed in [place].    Current GI Consult Staff: Dr. Quigley    Follow up recommendations:   -  Clinic appointment within Dr. Chacko in the Advanced Endoscopy clinic (Esophageal and Pancreas Biliary Clinics) -  ([phone]). We will cancel the current appointment on 9/30/21. Please call GI for follow-up plan when patient is about to be discharged.     Patricio Mattson MD  Gastroenterology Fellow  Division of Gastroenterology, Hepatology and Nutrition  [facility]  [phone]  See [facility] for GI on-call information

## 2021-09-22 NOTE — PROVIDER NOTIFICATION
Sepsis protocol triggered in dialysis. Lab did not call for a critical lactic level of 2.0. Nurse saw lab 1hr later. Pt hypotensive.     Provider notified (name/pager): [phone]  Time initial page sent: 5:30pm  Message sent: 5B 36 KR: Code sepsis called, Lactic 2.0, BP 83/34, , Pt reports lightheaded. Bernie RN [phone]  Response from provider: 1x 25g albumin and midodrine ordered. VS changed to q2 hrs.

## 2021-09-22 NOTE — PLAN OF CARE
[id number] Tube feeds at 45 mL/hr at 1200. Goal rate. Stated he feels like the new tube feed formula is going better. Abdomen tender and increase in pain when be touched or moved. Needs to be pre medicated to tolerate ADLs. Poor tolerance of ADLs today. Tolerated being on side for skin assessment for a very short amount of time. Possible change in medications to give longer lasting relief. Consults placed. Sleeping off and on during the shift. G tube to LIS. Left for dialysis around 1200. Dressing to be changed. Discussed pre medicating with IV dilaudid and soaking g tube with NS for 10 minutes to help dried secretions loosen. NPO. Productive cough today with a small amount of sputum. Plan of care: pain control, increasing physical activity, encourage incentive spirometer use.

## 2021-09-22 NOTE — PROGRESS NOTES
Nephrology Progress Note  09/22/2021         Dax Villareal is a 30 year old male with h/o ETOH hepatitis, end stage liver disease, RADHA on HD, transferred from [facility] in [place] for septic shock on 6/28/21 for treatment of necrotizing pancreatitis and decompensated liver disease. He was initially admitted to [facility] 5/19/21 and started on RRT 5/26/2021.        Interval History :   Mr Villareal had necrosectomy for 4th time on 8/11 with no more anticipated.  Running HD today, pulling 1-2L which should challenge his EDW as he has minimal intake.  Phos remains on high side which is likely due to intake, can change his TF to a renal formula.  Next planned run 9/24.        Assessment & Recommendations:   ESRD-Cr was 0.8 as recently as 5/19/2021 but now HD dependent for 3 months, started RRT at [facility] prior to transfer to [facility] on 5/26.  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  continuing restorative cares for now, BP's have been stable enough to support HD.  Had GI stenting of pancreas x4.                 -Line is TDC from PTA               -Planning HD on MWF schedule, refuses extra runs, will continue to offer.       Volume status-Anuric, wt at low for his course, not pulling much UF with runs but will continue to try to challenge EDW.      Electrolytes/pH-Na 129, K 4.4, no acute issues.      Ca/phos/pth-Ca 8.8, Mg 2.3, Phos high at 8.3 which is likely due to his PO intake.  Increased Sevelamer, is on Ca carbonate as well and should come down with HD, following closely.                     Anemia-Hgb low at 9.0, acute management per team, last PRBC's 7/14.  Started on EPO 5k with runs, iron sats 43%      Nutrition-Taking PO, appetite poor lately, has been NPO due to G tube placement yesterday, would change to renal formula to limit K and Phos.       Seen and discussed with Dr Ahuja      Recommendations were communicated to primary  "team via note       Tor Larsen, APRN CNS  Clinical Nurse Specialist  [phone]      Review of Systems:   I reviewed the following systems:  Gen: No fevers or chills  CV: No CP at rest  Resp: No SOB at rest  GI: No N/V      Physical Exam:   I/O last 3 completed shifts:  In: 1753 [NG/GT:908]  Out: 1350 [Emesis/NG output:1350]   /50 (BP Location: Right arm)   Pulse 101   Temp (!) 96.1  F (35.6  C) (Oral)   Resp 16   Ht 1.676 m (5' 6\")   Wt 66 kg (145 lb 8.1 oz)   SpO2 96%   BMI 23.48 kg/m       GENERAL APPEARANCE: Lying in bed, in no distress.    EYES:  scleral icterus, pupils equal  Pulmonary: decreased BS   CV: tachy   - Edema - no LE edema  GI: mild distention, non-tender. + tenderness at G-tube site and generalized abdominal pain.    MS: no evidence of inflammation in joints, no muscle tenderness  SKIN: no rash, warm, dry, no cyanosis, + G tube erythremia   NEURO: alert/interactive  ACCESS: Right TDC with no induration or erythema    Labs:   All labs reviewed by me  Electrolytes/Renal - Recent Labs   Lab Test 09/22/21  0527 09/21/21  0630 09/21/21  0233 09/20/21  1807 09/20/21  1032 09/20/21  0537 09/19/21  0719 09/19/21  0719 09/18/21  0542 09/18/21  0542   NA  --  129*  --   --   --   --   --  126*  --  129*   POTASSIUM  --  4.4  --   --   --   --   --  4.2  --  3.8   CHLORIDE  --  93*  --   --   --   --   --  87*  --  95   CO2  --  24  --   --   --   --   --  23  --  23   BUN  --  43*  --   --   --   --   --  51*  --  26   CR  --  2.69*  --   --   --   --   --  3.85*  --  2.57*   GLC  --  157* 159* 141*   < >  --   --  114*   < > 127*   JANENE  --  8.8  --   --   --   --   --  9.6  --  8.9   MAG 2.3 2.2  --   --   --  2.6*   < > 2.4*   < > 2.0   PHOS 8.3*  --   --   --   --  10.5*  --   --   --  6.2*    < > = values in this interval not displayed.       CBC -   Recent Labs   Lab Test 09/22/21  0527 09/20/21  0537 09/18/21  0542   WBC 8.8 11.3* 10.7   HGB 9.0* 9.4* 8.6*    162 264 "       LFTs -   Recent Labs   Lab Test 09/04/21  0414 09/03/21  0523 09/02/21  1730   ALKPHOS 81 95 120   BILITOTAL 1.4* 2.0* 2.2*   ALT 29 31 40   AST 38 39 57*   PROTTOTAL 8.8 9.2* 10.7*   ALBUMIN 2.9* 2.3* 2.7*       Iron Panel -   Recent Labs   Lab Test 08/14/21  2055 07/19/21  0632   IRON 28* 31*   IRONSAT 21 43   JERRELL 2,186*  --            Current Medications:    sodium chloride 0.9%  250 mL Intravenous Once in dialysis/CRRT     sodium chloride 0.9%  300 mL Hemodialysis Machine Once     amoxicillin  500 mg Oral Q24H     sodium bicarbonate  325 mg Per Feeding Tube Q4H    And     amylase-lipase-protease  1-2 capsule Per Feeding Tube Q4H     amylase-lipase-protease  2 capsule Per G Tube TID w/meals     B and C vitamin Complex with folic acid  5 mL Per Feeding Tube Daily     epoetin charles-epbx (RETACRIT) inj ESRD  5,000 Units Intravenous Once in dialysis/CRRT     fiber modular (NUTRISOURCE FIBER)  1 packet Per Feeding Tube TID     folic acid  1 mg Oral or Feeding Tube Daily     heparin (porcine)  500 Units Hemodialysis Machine OR IV Push Once in dialysis/CRRT     heparin lock flush  5-20 mL Intracatheter Q24H     melatonin  6 mg Oral or Feeding Tube At Bedtime     menthol   Transdermal Q8H     methylphenidate  5 mg Per Feeding Tube BID     oxyCODONE  5 mg Oral or Feeding Tube Q8H     pantoprazole (PROTONIX) IV  40 mg Intravenous Daily with breakfast     protein modular  1 packet Per Feeding Tube Daily     sevelamer carbonate (RENVELA)  1.6 g Oral or Feeding Tube TID     sodium chloride (PF)  9 mL Intracatheter During Dialysis/CRRT (from stock)     sodium chloride (PF)  9 mL Intracatheter During Dialysis/CRRT (from stock)     vitamin B1  100 mg Oral or Feeding Tube Daily    Or     thiamine  100 mg Intravenous Daily       - MEDICATION INSTRUCTIONS -       dextrose       heparin (porcine)       heparin (porcine) 1,000 Units/hr (08/24/21 0907)

## 2021-09-22 NOTE — PROGRESS NOTES
[facility]    Progress Note - [facility] 3 Service   Date of Admission:  6/28/2021    Changes today:  - Palliative care consulted   - Gtube to LIS    Assessment & Plan              Dax Villareal is a 30 year old man with hx of alcohol use disorder admitted on 6/28/2021 after recent prolonged admission at [facility] in [place] who has severe acute alcoholic hepatitis c/b HE, ESRD requiring HD, and malnutrition in the setting of acute necrotizing pancreatitis, s/p PEG-J placement on 7/13. He arrived with acute respiratory failure and septic shock that have resolved, and his secondary bacterial peritonitis has been appropriately treated. He requires continued management for ESRD, hepatic encephalopathy, infected pancreatic pseudocyst, nutritional support via PEG-J, and persistent right pleural effusion. Clinically improving after multiple necrosectomies. Increasing leukocytosis and hypotension on 9/2 prompting resumption of IV antibiotics - has been stable since that time. Repeat CT with interval improvement in fluid collections.    Necrotizing alcoholic pancreatitis w/ infected pseudocyst, improving  Hepatic Fluid collection, stable to improving  Secondary Bacterial Peritonitis 2/2 infected pseudocyst with VRE, improving  Septic Shock, resolved  Admitted to the [facility] ICU on 6/28/2021 from [facility] in [place] for septic shock due to necrotizing pancreatitis. CT on admit showing mature collection with enhancing wall measuring ~[id number]. IR placed perc drain 6/29. Repeat imaging 7/18 revealed walled off necrotic collection amenable to endoscopic transluminal drainage with cystgastrostomy stenting with necrosectomy on 7/21/21. Complete necrosectomy on 8/11. Antibiotics (cefepime and metronidazole) were discontinued on 8/25 after left flank drain removed, re-initiated in the setting of fever on 9/2. Favor abdominal etiology, repeat CT on 9/7 with interval improvement in  fluid collections - will complete 7d course of abx per ID with EOT 9/8.  - Will likely need intermittent antibiotics with fluid collections occasionally with bacterial growth moving forward as to be expected in nec panc  - Pain - scheduled oxycodone q8H and IV dilaudid for dressing changes  - Palliative care consulted for pain management and goals of care given overall illness     G-tube Fistula   Dax had been having increasing G-tube output and was found to have an erythematous indurated area next to his G-tube insertion. This was found to be his G-tube balloon pushing through the skin. He underwent G-tube replacement on  9/13/21. His old G-tube site has since had large volume output requiring frequent wound care dressing changes.   - 9/20/21 - EGD to close fistula completed by GI  - G tube to light intermittent suction or gravity for 1 week to allow healing of fistula site  - Wound consult placed     Periapical abscess: L 3rd maxillary molar  Dental Caries  - Dentistry consulted, and discussed with oral surgery  - Amoxicillin for 7d course (9/21-)  - Dental extraction when stable [facility]    Hypovolemia  Patient with substantial daily NG output and poor PO. Also note significant weight loss over the past few weeks. He has needed intermittent boluses of normal saline for euvolemia. Have discussed with renal multiple times - please re-evaluate volume status vs dry weight as he is often hypotensive following fluid removal.   - Weight 78kg on 8/13. Now 55 kg. Concern for weight loss vs. targeting dry weight.  - Monitor vitals and output post dialysis, consider fluids prn.  - Consider discussing using midodrine with Nephrology      Gastric Outlet Obstruction  High G tube output due to the cystogastric connection/fluid drainage noted on 8/5. Per GI, possibly related to gastric outlet from severe abdominal inflammation and is expected. Continues to have mild  to moderate NG output - stable to improved. Relieved with draining G tube to gravity when experiencing pain/nausea.      Hyponatremia, stable  Patient with down-trending sodium the week of 8/27; likely due to substantial NG output and low solute intake and hepatic dysfunction. Stable 127-128.     Right Pancreatic Hydrothorax, stable  Increased dyspnea with CXR on 7/19 with further imaging and thoracentesis confirming a hepatic/pancreatic hydrothorax. Thoracentesis PRN for dyspnea, most recently 9/3.       ESRD due to ATN  Hyperphosphatemia  Stage III RADHA due to ATN from septic shock without recovery for >3 months. Line is tunneled dialysis catheter from [facility]. Nephrology dialyzed on Tu, Th, Sa schedule.  - Will need follow-up and outpatient dialysis  - Sevelemer powder TID with TF for hyperphosphatemia   - Discuss if needs dialysis line replacement      Anemia of chronic illness  Hematochezia, resolved  Hemoperitoneum  Baseline Hgb 17. Running between 6-8 in setting of chronic illness, new ESRD with epocrit per Nephrology, frequent lab draws and procedures. Hematochezia on 8/8 after starting standard intensity heparin, now resolved. Paracentesis on 8/21 with hemoperitoneum. No active bleeding. Required a unit of PRBC on 9/4 in the setting of several fluid boluses.   - Goal hgb > 7.0, trend Hgb     Alcohol Use Disorder  Counseled patient on complete alcohol cessation. Not interested in additional services at this time. Continue daily folic acid and thiamine.  - Ensure patient has resources for treatment at discharge if managing alcohol cessation independently becomes challenging.    Severe malnutrition of chronic illness  Poor oral intake in the setting of necrotizing pancreatitis. PEG tube placed 7/13, replaced 9/14. Continuous tube feeds with goal of 65 ml/hr with continuous pancreatic enzymes. Transitioning to renal formula.   - Continuous tube feeds     Diet: NPO with tube feeds  DVT Prophylaxis: Serina Rdz  Catheter: Not present  Central Lines: PRESENT  PICC Triple Lumen 06/28/21 Left-Site Assessment: WDL  CVC Double Lumen Right-Site Assessment: WDL  Code Status: Full Code      Disposition Plan   Expected discharge:7-8 days  recommended to long term care once adequate pain management/ tolerating PO medications and safe disposition plan/ TCU bed available.     The patient's care was discussed with the Attending Physician, Dr. Ibanez.    Zi Wood MD  PGY-2, Internal Medicine   ______________________________________________________________________    Interval History   NAEO. Pt doing okay today, he reports continued pain near G tube site. He is open to talking to palliative care.     Data reviewed today: I reviewed all medications, new labs and imaging results over the last 24 hours. I personally reviewed no images or EKG's today.    Physical Exam   Vital Signs: Temp: 98.3  F (36.8  C) Temp src: Oral BP: 96/48 Pulse: 116   Resp: 13 SpO2: 97 % O2 Device: Nasal cannula Oxygen Delivery: 2 LPM  Weight: 145 lbs 8.06 oz  General Appearance: Chronically ill appearing, in no acute distress  Respiratory: normal work of breathing on ambient air   Cardiovascular: regular rate   GI: soft ND, tender to palpation around Gtube site   Skin: erythema around procedures site   Other: Alert, conversant

## 2021-09-22 NOTE — PROGRESS NOTES
HEMODIALYSIS TREATMENT NOTE    Date: 9/22/2021  Time: 3:59 PM    Data:  Pre Wt: 66 kg (145 lb 8.1 oz)   Desired Wt: 64 kg   Post Wt: 65.1 kg (143 lb 8.3 oz)  Weight change: 0.9 kg  Ultrafiltration - Post Run Net Total Removed (mL): 900 mL  Vascular Access Status: CVC  patent  Dialyzer Rinse: Streaked, Light  Total Blood Volume Processed: 44 L   Total Dialysis (Treatment) Time: 3   Dialysate Bath: K 3, Ca 2.25  Heparin: None    Lab:   No    Interventions:  tums given      Assessment:  Pt had low BP and requested to come off 30 mins early. Treatment otherwise stable. Handoff given to floor RN.     Plan:    Per renal

## 2021-09-22 NOTE — CODE/RAPID RESPONSE
Rapid Response Team Note    Assessment   In assessment a rapid response was called on Dax Villareal due to hypotension. This presentation is likely due to Hepatorenal syndrome and intravascular depletion with third spacing and worsened by ESRD on HD  Cirrhosis.     Plan   -  Give Albumin 5%, 25 grams x1, start midodrine 5 mg TID   -  If blood pressure does not respond with above interventions will pursue further work up with CBC, CMP, blood cultures and possible imaging.  -  The [facility]  primary team was paged and currently awaiting a response.  -  Disposition: The patient will remain on the current unit. We will continue to monitor this patient closely.  -  Reassessment and plan follow-up will be performed by the primary team      Veuns Riggins PA-C  [facility] RRT [facility] Job Code Contact #[phone]    Hospital Course   Brief Summary of events leading to rapid response:   RRT was initially called for lactic acid, but lactic was 2.0, but patient was also hypotensive to 84/34. Mild tachycardia to 110s. Afebrile. No leukocytosis. Asymptomatic. Denies any dizziness or lightheadedness. Denies any CP or SOB. Endorses abdominal pain around surgical site, and denies having abdominal exam until received IV dilaudid. Patient just returned from HD. Had some nausea during dialysis, and stopped dialysis early due to intolerance. Denies any vomiting or diarrhea. Blood pressure baseline in SBP 90s/30s to 100/40s.     Admission Diagnosis:   Pancreatitis [K85.90]     Physical Exam   Temp: 98.1  F (36.7  C) Temp  Min: 96.1  F (35.6  C)  Max: 100.4  F (38  C)  Resp: 14 Resp  Min: 11  Max: 40  SpO2: 93 % SpO2  Min: 93 %  Max: 99 %  Pulse: 114 Pulse  Min: 98  Max: 122    No data recorded  BP: (!) 84/34 Systolic (24hrs), Av , Min:76 , Max:107   Diastolic (24hrs), Av, Min:34, Max:83     I/Os: I/O last 3 completed shifts:  In: 1840 [NG/GT:880]  Out: 1250 [Emesis/NG output:1250]     Exam:   General: chronically ill appearing,  cachetic  Mental Status: baseline mental status.  CV: Tachycardic rate, regular rhythm  Resp: Lungs CTA. Non labored breathing on 2L   Abd: Slightly distended. Soft. TTP in epigastrium without any rebound tenderness. G-tube in place. Dressing next to G-tube C/D/I, with incision below well approximated without any purulence or discharge   Extremities: WWP. No edema.      Significant Results and Procedures   Lactic Acid:   Recent Labs   Lab Test 09/22/21  1610 09/19/21  2225 09/18/21  2141 07/09/21  2215 07/08/21  2116 07/07/21  2106 07/06/21 2106   LACT 2.0 1.4 1.6   < >  --   --   --    LACTS  --   --   --   --  1.1 0.9 1.5    < > = values in this interval not displayed.     CBC:   Recent Labs   Lab Test 09/22/21  0527 09/20/21  0537 09/18/21  0542   WBC 8.8 11.3* 10.7   HGB 9.0* 9.4* 8.6*   HCT 28.0* 28.4* 27.6*    279 264        Sepsis Evaluation   The patient is not known to have an infection.  NO EVIDENCE OF SEPSIS at this time.  Vital sign, physical exam, and lab findings are due to hepatorenal syndrome and cirrhosis.

## 2021-09-23 LAB
ANION GAP SERPL CALCULATED.3IONS-SCNC: 12 MMOL/L (ref 3–14)
BUN SERPL-MCNC: 51 MG/DL (ref 7–30)
CALCIUM SERPL-MCNC: 9.3 MG/DL (ref 8.5–10.1)
CHLORIDE BLD-SCNC: 89 MMOL/L (ref 94–109)
CO2 SERPL-SCNC: 23 MMOL/L (ref 20–32)
CREAT SERPL-MCNC: 3.17 MG/DL (ref 0.66–1.25)
ERYTHROCYTE [DISTWIDTH] IN BLOOD BY AUTOMATED COUNT: 15.6 % (ref 10–15)
GFR SERPL CREATININE-BSD FRML MDRD: 25 ML/MIN/1.73M2
GLUCOSE BLD-MCNC: 139 MG/DL (ref 70–99)
HCT VFR BLD AUTO: 28.5 % (ref 40–53)
HGB BLD-MCNC: 9.1 G/DL (ref 13.3–17.7)
MAGNESIUM SERPL-MCNC: 1.8 MG/DL (ref 1.6–2.3)
MCH RBC QN AUTO: 30.7 PG (ref 26.5–33)
MCHC RBC AUTO-ENTMCNC: 31.9 G/DL (ref 31.5–36.5)
MCV RBC AUTO: 96 FL (ref 78–100)
PLATELET # BLD AUTO: 246 10E3/UL (ref 150–450)
POTASSIUM BLD-SCNC: 3.4 MMOL/L (ref 3.4–5.3)
RBC # BLD AUTO: 2.96 10E6/UL (ref 4.4–5.9)
SODIUM SERPL-SCNC: 124 MMOL/L (ref 133–144)
WBC # BLD AUTO: 11.3 10E3/UL (ref 4–11)

## 2021-09-23 PROCEDURE — 87040 BLOOD CULTURE FOR BACTERIA: CPT | Performed by: STUDENT IN AN ORGANIZED HEALTH CARE EDUCATION/TRAINING PROGRAM

## 2021-09-23 PROCEDURE — 250N000013 HC RX MED GY IP 250 OP 250 PS 637: Performed by: INTERNAL MEDICINE

## 2021-09-23 PROCEDURE — C9113 INJ PANTOPRAZOLE SODIUM, VIA: HCPCS | Performed by: INTERNAL MEDICINE

## 2021-09-23 PROCEDURE — 85027 COMPLETE CBC AUTOMATED: CPT | Performed by: STUDENT IN AN ORGANIZED HEALTH CARE EDUCATION/TRAINING PROGRAM

## 2021-09-23 PROCEDURE — 250N000013 HC RX MED GY IP 250 OP 250 PS 637

## 2021-09-23 PROCEDURE — 83735 ASSAY OF MAGNESIUM: CPT | Performed by: INTERNAL MEDICINE

## 2021-09-23 PROCEDURE — 99233 SBSQ HOSP IP/OBS HIGH 50: CPT | Mod: GC | Performed by: STUDENT IN AN ORGANIZED HEALTH CARE EDUCATION/TRAINING PROGRAM

## 2021-09-23 PROCEDURE — 250N000011 HC RX IP 250 OP 636

## 2021-09-23 PROCEDURE — 36592 COLLECT BLOOD FROM PICC: CPT | Performed by: STUDENT IN AN ORGANIZED HEALTH CARE EDUCATION/TRAINING PROGRAM

## 2021-09-23 PROCEDURE — 120N000002 HC R&B MED SURG/OB UMMC

## 2021-09-23 PROCEDURE — 250N000011 HC RX IP 250 OP 636: Performed by: INTERNAL MEDICINE

## 2021-09-23 PROCEDURE — 250N000011 HC RX IP 250 OP 636: Performed by: HOSPITALIST

## 2021-09-23 PROCEDURE — 99223 1ST HOSP IP/OBS HIGH 75: CPT | Mod: GC | Performed by: HOSPITALIST

## 2021-09-23 PROCEDURE — 82310 ASSAY OF CALCIUM: CPT | Performed by: INTERNAL MEDICINE

## 2021-09-23 PROCEDURE — 36592 COLLECT BLOOD FROM PICC: CPT | Performed by: INTERNAL MEDICINE

## 2021-09-23 PROCEDURE — 250N000013 HC RX MED GY IP 250 OP 250 PS 637: Performed by: STUDENT IN AN ORGANIZED HEALTH CARE EDUCATION/TRAINING PROGRAM

## 2021-09-23 PROCEDURE — 250N000013 HC RX MED GY IP 250 OP 250 PS 637: Performed by: PHYSICIAN ASSISTANT

## 2021-09-23 RX ORDER — PROCHLORPERAZINE MALEATE 5 MG
5 TABLET ORAL EVERY 6 HOURS PRN
Status: COMPLETED | OUTPATIENT
Start: 2021-09-23 | End: 2021-09-24

## 2021-09-23 RX ORDER — ONDANSETRON 2 MG/ML
4 INJECTION INTRAMUSCULAR; INTRAVENOUS 3 TIMES DAILY
Status: DISCONTINUED | OUTPATIENT
Start: 2021-09-23 | End: 2021-10-03

## 2021-09-23 RX ORDER — ONDANSETRON 4 MG/1
4 TABLET, ORALLY DISINTEGRATING ORAL 3 TIMES DAILY
Status: DISCONTINUED | OUTPATIENT
Start: 2021-09-23 | End: 2021-10-04

## 2021-09-23 RX ADMIN — PANTOPRAZOLE SODIUM 40 MG: 40 INJECTION, POWDER, FOR SOLUTION INTRAVENOUS at 08:58

## 2021-09-23 RX ADMIN — Medication 15 ML: at 18:00

## 2021-09-23 RX ADMIN — HYDROXYZINE HYDROCHLORIDE 50 MG: 25 TABLET, FILM COATED ORAL at 14:13

## 2021-09-23 RX ADMIN — SEVELAMER CARBONATE 1.6 G: 800 POWDER, FOR SUSPENSION ORAL at 17:59

## 2021-09-23 RX ADMIN — HYDROXYZINE HYDROCHLORIDE 50 MG: 25 TABLET, FILM COATED ORAL at 06:31

## 2021-09-23 RX ADMIN — PROCHLORPERAZINE MALEATE 5 MG: 5 TABLET ORAL at 18:59

## 2021-09-23 RX ADMIN — OXYCODONE HYDROCHLORIDE 5 MG: 5 SOLUTION ORAL at 12:50

## 2021-09-23 RX ADMIN — SEVELAMER CARBONATE 1.6 G: 800 POWDER, FOR SUSPENSION ORAL at 21:00

## 2021-09-23 RX ADMIN — ONDANSETRON 4 MG: 2 INJECTION INTRAMUSCULAR; INTRAVENOUS at 16:16

## 2021-09-23 RX ADMIN — HYDROMORPHONE HYDROCHLORIDE 1 MG: 1 SOLUTION ORAL at 16:05

## 2021-09-23 RX ADMIN — PANCRELIPASE 2 CAPSULE: 24000; 76000; 120000 CAPSULE, DELAYED RELEASE PELLETS ORAL at 08:56

## 2021-09-23 RX ADMIN — METHYLPHENIDATE HYDROCHLORIDE 5 MG: 5 TABLET ORAL at 12:56

## 2021-09-23 RX ADMIN — SEVELAMER CARBONATE 1.6 G: 800 POWDER, FOR SUSPENSION ORAL at 08:58

## 2021-09-23 RX ADMIN — PANCRELIPASE 2 CAPSULE: 24000; 76000; 120000 CAPSULE, DELAYED RELEASE PELLETS ORAL at 18:00

## 2021-09-23 RX ADMIN — PANCRELIPASE 2 CAPSULE: 24000; 76000; 120000 CAPSULE, DELAYED RELEASE PELLETS ORAL at 12:50

## 2021-09-23 RX ADMIN — SODIUM BICARBONATE 325 MG: 325 TABLET ORAL at 17:59

## 2021-09-23 RX ADMIN — SODIUM BICARBONATE 325 MG: 325 TABLET ORAL at 00:11

## 2021-09-23 RX ADMIN — HYDROMORPHONE HYDROCHLORIDE 0.5 MG: 1 INJECTION, SOLUTION INTRAMUSCULAR; INTRAVENOUS; SUBCUTANEOUS at 22:26

## 2021-09-23 RX ADMIN — ONDANSETRON 4 MG: 2 INJECTION INTRAMUSCULAR; INTRAVENOUS at 10:22

## 2021-09-23 RX ADMIN — Medication 1 PACKET: at 09:00

## 2021-09-23 RX ADMIN — ACETAMINOPHEN 325 MG: 325 SOLUTION ORAL at 18:59

## 2021-09-23 RX ADMIN — THIAMINE HCL TAB 100 MG 100 MG: 100 TAB at 08:59

## 2021-09-23 RX ADMIN — ONDANSETRON 4 MG: 2 INJECTION INTRAMUSCULAR; INTRAVENOUS at 21:00

## 2021-09-23 RX ADMIN — CALCIUM CARBONATE (ANTACID) CHEW TAB 500 MG 500 MG: 500 CHEW TAB at 11:31

## 2021-09-23 RX ADMIN — SODIUM BICARBONATE 325 MG: 325 TABLET ORAL at 04:12

## 2021-09-23 RX ADMIN — Medication 5 ML: at 08:58

## 2021-09-23 RX ADMIN — Medication 1 PACKET: at 20:59

## 2021-09-23 RX ADMIN — FOLIC ACID 1 MG: 1 TABLET ORAL at 08:59

## 2021-09-23 RX ADMIN — AMOXICILLIN 500 MG: 400 POWDER, FOR SUSPENSION ORAL at 13:00

## 2021-09-23 RX ADMIN — SODIUM BICARBONATE 325 MG: 325 TABLET ORAL at 08:57

## 2021-09-23 RX ADMIN — PANCRELIPASE 2 CAPSULE: 24000; 76000; 120000 CAPSULE, DELAYED RELEASE PELLETS ORAL at 00:11

## 2021-09-23 RX ADMIN — MIDODRINE HYDROCHLORIDE 5 MG: 5 TABLET ORAL at 08:59

## 2021-09-23 RX ADMIN — PANCRELIPASE 2 CAPSULE: 24000; 76000; 120000 CAPSULE, DELAYED RELEASE PELLETS ORAL at 04:12

## 2021-09-23 RX ADMIN — SODIUM BICARBONATE 325 MG: 325 TABLET ORAL at 20:58

## 2021-09-23 RX ADMIN — OXYCODONE HYDROCHLORIDE 5 MG: 5 SOLUTION ORAL at 04:12

## 2021-09-23 RX ADMIN — Medication 1 PACKET: at 13:00

## 2021-09-23 RX ADMIN — HYDROMORPHONE HYDROCHLORIDE 1 MG: 1 SOLUTION ORAL at 07:55

## 2021-09-23 RX ADMIN — HYDROMORPHONE HYDROCHLORIDE 1 MG: 1 SOLUTION ORAL at 03:22

## 2021-09-23 RX ADMIN — OXYCODONE HYDROCHLORIDE 5 MG: 5 SOLUTION ORAL at 20:59

## 2021-09-23 RX ADMIN — ONDANSETRON 4 MG: 2 INJECTION INTRAMUSCULAR; INTRAVENOUS at 02:06

## 2021-09-23 RX ADMIN — CYCLOBENZAPRINE 10 MG: 10 TABLET, FILM COATED ORAL at 02:06

## 2021-09-23 RX ADMIN — METHYLPHENIDATE HYDROCHLORIDE 5 MG: 5 TABLET ORAL at 08:59

## 2021-09-23 RX ADMIN — SODIUM BICARBONATE 325 MG: 325 TABLET ORAL at 12:50

## 2021-09-23 RX ADMIN — PANCRELIPASE 2 CAPSULE: 24000; 76000; 120000 CAPSULE, DELAYED RELEASE PELLETS ORAL at 20:57

## 2021-09-23 ASSESSMENT — ACTIVITIES OF DAILY LIVING (ADL)
ADLS_ACUITY_SCORE: 18
ADLS_ACUITY_SCORE: 14
ADLS_ACUITY_SCORE: 18
ADLS_ACUITY_SCORE: 18

## 2021-09-23 ASSESSMENT — MIFFLIN-ST. JEOR: SCORE: 1577.75

## 2021-09-23 NOTE — CONSULTS
[facility]  Palliative Care Consultation Note    Patient: Dax Villareal  Date of Admission:  6/28/2021    Requesting Clinician / Team: Daniela Logan  Reason for consult: Pain management  Goals of care    Recommendations:      Change ondansetron to scheduled 3 times daily (ordered)    Mr. Villareal reports consistent nausea especially in the morning and when waking after naps.  Plan to reassess effectiveness in the coming days.    Continue prochlorperazine as needed when ondansetron is not effective    Plan to keep pain medication regimen as it is for now while we continue to gather history    Currently it is unclear but it seems that he may require chronic opioid management and may benefit from transition to a long-acting opioid agonist with close follow-up with palliative care or pain clinic.    We will continue to discuss and evaluate psychosocial support system which will be important for success after discharge.      These recommendations have been discussed with the patient.      Thank you for the opportunity to participate in the care of this patient and family. Our team: will continue to follow.     During regular M-F work hours -- if you are not sure who specifically to contact -- please contact us by sending a text page to our team consult pager at [phone].    After regular work hours and on weekends/holidays, you can call our answering service at [phone]. Also, who's on call for us is available in [employer].       Assessments:  Dax Villareal is a 30 year old man with history of alcohol dependence with a prolonged and complex hospitalization for acute alcoholic hepatitis and associated liver failure that has been complicated by RADHA requiring HD, hepatic encephalopathy, SBP causing septic shock, infected necrotizing pancreatitis requiring drainage and multiple necrosectomies, malnutrition requiring PEG-J leading to fistula s/p endoscopic suture  gastroplexy, with continue difficulty with pain management and palliative medicine re-consulted to assist with pain management and goals of care.       Pain: Mr. Villareal describes his pain as sharp and stabbing, focal to his mid abdomen, currently pointing to the area where his prior G-tube was placed and where a fistula had formed.  The pain seems to increase with certain movements but does have a component that is present constantly.  He associates his current pain with the recent procedure for his fistula.  However, when questioned he confirms that his pain was present before this in the same quality and location and associates it with the other procedures and surgeries that he has had during this hospitalization.  He also reported having pain prior to his admission intermittently which he openly reports having treated with drinking alcohol. His current pain regimen seems to be overall effective in helping reduce his pain when he gets the scheduled oxycodone and as needed Dilaudid though he does report that the effect wears off about an hour before his next dose is due.  He likely would benefit from a transition to a regimen that includes a more consistent basal coverage pain control through the day and night but we would like to continue to learn more about his pain before making recommendation for change at this point (he cut our meeting short on two occasions today as he was nauseated and did not want to talk further). In the longer run, he should probably have support without or only minimal opioids. As we explore expectations, he states that the pain should improve and resolve over time, so he won't need medications anymore. His history of alcohol use disorder puts him at higher risk of non-medical opioid use/OUD. Should he need chronic opioid treatment, he might be a good candidate for buprenorphine/suboxone treatment. Per our conversation today and initial chart review it also appears that he doesn't have  "robust sober support in the community.      Nausea: He reports nausea frequently through the day with occasional vomiting.  He associates the onset of nausea when waking in the morning and waking after naps through the day.  We discussed the potential benefit of scheduling ondansetron with keeping prochlorperazine available as needed to which she was agreeable.    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  He reports that he intends to remain in the [facility] after discharge and currently has a tentative plan to move in with friends in [place], [place].  His mother is also planning to move to [place] with her boyfriend which might provide additional family support in the local area.  He is adamant that he intends to quit drinking alcohol because he \"needs to\". Of note, he reports that at least one of the friends he intends to move in with will continue to drink.      Today, the patient was seen for:  -Pain management  -ESLD  - alcohol use disorder  -Nausea management  -goals of care/psychosocial support    Prognosis, Goals, & Planning:      Functional Status just prior to hospitalization: Not address.  Dax has been hospitalized since 5/2021.      Prognosis, Goals, and/or Advance Care Planning were addressed today: Yes        Summary/Comments: Goals of care are restorative.  See above.      Patient's decision making preferences: not assessed          Patient has decision-making capacity today for complex decisions: Yes            I have concerns about the patient/family's health literacy today: No           Patient has a completed Health Care Directive: No. This has been discussed previously during his hospitalization but is unclear that a healthcare directive was ever completed.      Code status: Full Code    Coping, Meaning, & Spirituality:   Mood, coping, and/or meaning in the context of serious illness were addressed today: " Yes  Summary/Comments: Briefly discussed history of using alcohol to cope.  See above.    Social:     Living situation: Has been hospitalized since May 2021.  Previously lived independently.  Intends to move in with friends once discharged.  Likely to need TCU on discharge.    Key family / caregivers: Family with not present today.  Dax reports support from his mom and brother.    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    Substance use history: Alcohol dependence prior to admission    History of Present Illness:  History gathered today from: patient    Introduced the scope of our practice to Mr. Villareal. Discussed our potential roles for symptom management, support/coping, and decisional support (aka goals of care).     See assessment above.  We primarily discussed his pain and continued need for opioid pain medications as well as his intermittent nausea.  We discussed family and that his mother plans to move to [place] area with her boyfriend and that his brother visits periodically from [place].  We visited on 2 separate occasions today and both times he initially was open to talking but later requested that we stop because he had just woken up and was nauseated.    Key Palliative Symptom Data:  # Pain severity the last 12 hours: moderate  # Nausea severity the last 12 hours: moderate    Patient is on opioids: assessed and bowels ok/no needed changes to plan of care today.    ROS:  Comprehensive ROS is reviewed and is negative except as here & per HPI:     Past Medical History:  Past Medical History:   Diagnosis Date     ADHD 2008        Past Surgical History:  Past Surgical History:   Procedure Laterality Date     COMBINED ESOPHAGOSCOPY, GASTROSCOPY, DUODENOSCOPY (EGD), TISSUE APPOSI N/A 9/20/2021    Procedure: ESOPHAGOGASTRODUODENOSCOPY WITH SUTURE FISTULA CLOSURE, argon plasma coagulation;  Surgeon: Jose Quigley MD;  Location: [facility] OR     ENDOSCOPIC INSERTION TUBE GASTROSTOMY N/A  7/13/2021    Procedure: Upper endoscopy, INSERTION, PEG-J TUBE and Gastropexy;  Surgeon: Rayshawn Will MD;  Location: [facility]     ENDOSCOPIC INSERTION TUBE GASTROSTOMY  9/13/2021    Procedure: PEG/J PLACEMENT.;  Surgeon: Rayshawn Will MD;  Location: [facility]     ENDOSCOPIC ULTRASOUND UPPER GASTROINTESTINAL TRACT (GI) N/A 7/21/2021    Procedure: ENDOSCOPIC ULTRASOUND, ESOPHAGOSCOPY / UPPER GASTROINTESTINAL TRACT (GI) with cyst gastrostomy, dilation;  Surgeon: Guru Yecenia Chacko MD;  Location: [facility]     ENDOSCOPIC ULTRASOUND, ESOPHAGOSCOPY, GASTROSCOPY, DUODENOSCOPY (EGD), NECROSECTOMY N/A 8/11/2021    Procedure: ESOPHAGOGASTRODUODENOSCOPY (EGD) with necrosectomy, stent exchange.;  Surgeon: Amadou Beasley MD;  Location: [facility]     ESOPHAGOSCOPY, GASTROSCOPY, DUODENOSCOPY (EGD), COMBINED N/A 7/28/2021    Procedure: ESOPHAGOGASTRODUODENOSCOPY (EGD), gastrostomy apposition, Necrosectomy, stent exchange.;  Surgeon: Rayshawn Will MD;  [facility]: [facility]     ESOPHAGOSCOPY, GASTROSCOPY, DUODENOSCOPY (EGD), COMBINED N/A 8/6/2021    Procedure: ESOPHAGOGASTRODUODENOSCOPY (EGD) with necrosectomy, and stents exchanged;  Surgeon: Jose Quigley MD;  Location: [facility]     ESOPHAGOSCOPY, GASTROSCOPY, DUODENOSCOPY (EGD), COMBINED Left 9/13/2021    Procedure: ESOPHAGOGASTRODUODENOSCOPY (EGD), GASTROSTOMY TUBE REMOVAL, FISTULAR CLOSURE ENDOSCOPIC.;  Surgeon: Rayshawn Will MD;  Location: [facility]     IR ABSCESS TUBE CHANGE  7/14/2021     IR PARACENTESIS  7/21/2021     IR SINOGRAM INJECTION DIAGNOSTIC  8/25/2021     ORTHOPEDIC SURGERY Right     fracture repair     REPLACE GASTROSTOMY TUBE, PERCUTANEOUS N/A 7/28/2021    Procedure: gastro-jejunostomy tube exchange;  Surgeon: Rayshawn Will MD;  Location: [facility] OR         Family History:  Family History   Problem Relation Age of Onset     Attention Deficit Disorder Mother      Alcoholism Mother      Alcoholism Father      Alcoholism  Brother      Alcoholism Sister          Allergies:  Allergies   Allergen Reactions     Tylenol [Acetaminophen] Itching        Medications:  I have reviewed this patient's medication profile and medications from this hospitalization.   Noted meds are:  Methylphenidate  Oxycodone scheduled  Hydromorphone prn    Physical Exam:  Vital Signs: Temp: (!) 96.7  F (35.9  C) Temp src: Oral BP: (!) 91/32 Pulse: 114   Resp: 18 SpO2: 94 % O2 Device: Nasal cannula Oxygen Delivery: 3 LPM  Weight: 151 lbs .24 oz    Last BM: 9/21    CONSTIT: awake, appears comfortable  EENT: MM dry, lips with mild cracks, EOMI, no icterus  RESP: reg, normal effort  GI: Dressing over prior G-tube site, current G-tube in place  MSK:moves x4, no edema  SKIN:  warm, no rash, no obvious lesions  NEURO: alert, oriented x3  PSYCH: appropriate/pleasant affect, memory and thought process intact      Data reviewed:  Recent imaging reviewed, my comments on pertinents:   No recent imaging    Recent lab data reviewed, my comments on pertinents:   Sodium 124, wbc 11.3, hgb 9.1 - stable, plt 96 - stable    Ruperto Portillo MD  Palliative Medicine and Hospice Fellow    Patient seen and evaluated with Dr. Portillo.    Agree with assessment and recommendations.       Total time spent was 77 minutes,  >50% of time was spent counseling and/or coordination of care regarding assessment of pain, nausea and treatment recommendations; assessment of disease understanding.    Claritza Rojas MD  Palliative Medicine  Pager [phone]

## 2021-09-23 NOTE — PROGRESS NOTES
[facility]    Progress Note - Daniela 3 Service   Date of Admission:  6/28/2021    Changes today:  - Will follow-up palliative care recs  - Blood cultures & CBC (temperature 100.4 x1, 2 episodes of hypotension)  - Will continue to monitor for sepsis  - Sepsis code called 9/22 pm for BP 84/34 and lactate 2, started on albumin 5% 25g x1 and midodrine 5mg TID  - discontinued midodrine    Assessment & Plan              Dax Villareal is a 30 year old man with hx of alcohol use disorder admitted on 6/28/2021 after recent prolonged admission at [facility] in [place] who has severe acute alcoholic hepatitis c/b HE, ESRD requiring HD, and malnutrition in the setting of acute necrotizing pancreatitis, s/p PEG-J placement on 7/13. He arrived with acute respiratory failure and septic shock that have resolved, and his secondary bacterial peritonitis has been appropriately treated. He requires continued management for ESRD, hepatic encephalopathy, infected pancreatic pseudocyst, nutritional support via PEG-J, and persistent right pleural effusion. Clinically improving after multiple necrosectomies. Increasing leukocytosis and hypotension on 9/2 prompting resumption of IV antibiotics - has been stable since that time. Repeat CT with interval improvement in fluid collections.    Necrotizing alcoholic pancreatitis w/ infected pseudocyst, improving  Hepatic Fluid collection, stable to improving  Secondary Bacterial Peritonitis 2/2 infected pseudocyst with VRE, improving  Septic Shock, resolved  Admitted to the [facility] ICU on 6/28/2021 from [facility] in [place] for septic shock due to necrotizing pancreatitis. CT on admit showing mature collection with enhancing wall measuring ~[id number]. IR placed perc drain 6/29. Repeat imaging 7/18 revealed walled off necrotic collection amenable to endoscopic transluminal drainage with cystgastrostomy stenting with necrosectomy on 7/21/21. Complete  necrosectomy on 8/11. Antibiotics (cefepime and metronidazole) were discontinued on 8/25 after left flank drain removed, re-initiated in the setting of fever on 9/2. Favor abdominal etiology, repeat CT on 9/7 with interval improvement in fluid collections - will complete 7d course of abx per ID with EOT 9/8.  - Will likely need intermittent antibiotics with fluid collections occasionally with bacterial growth moving forward as to be expected in nec panc  - Pain - scheduled oxycodone q8H and IV dilaudid for dressing changes  - Palliative care consulted for pain management and goals of care given overall illness    G-tube Fistula  Dax had been having increasing G-tube output and was found to have an erythematous indurated area next to his G-tube insertion. This was found to be his G-tube balloon pushing through the skin. He underwent G-tube replacement on  9/13/21. His old G-tube site has since had large volume output requiring frequent wound care dressing changes.  - 9/20/21 - EGD to close fistula completed by GI  - G tube to light intermittent suction or gravity for 1 week to allow healing of fistula site (until 9/27)  - Wound team consulted, follow their wound care recs    Periapical abscess: L 3rd maxillary molar  Dental Caries  - Dentistry consulted, and discussed with oral surgery  - Amoxicillin for 7d course (9/21-)  - Dental extraction when stable to be transported (while inpatient) to [facility] dental clinic    Hypovolemia  Patient with substantial daily NG output and poor PO. Also note significant weight loss over the past few weeks. He has needed intermittent boluses of normal saline for euvolemia. Have discussed with renal multiple times - please re-evaluate volume status vs dry weight as he is often hypotensive following fluid removal.  - Weight 78kg on 8/13. Then 55 kg, weight 68.5kg on 9/22. Concern for weight loss vs. targeting dry weight.  - Monitor vitals and output post dialysis, consider fluids  prn.  - Consider discussing using midodrine with Nephrology    Gastric Outlet Obstruction  High G tube output due to the cystogastric connection/fluid drainage noted on 8/5. Per GI, possibly related to gastric outlet from severe abdominal inflammation and is expected. Continues to have mild to moderate NG output - stable to improved. Relieved with draining G tube to gravity when experiencing pain/nausea.    Hyponatremia, stable  Patient with down-trending sodium the week of 8/27; likely due to substantial NG output and low solute intake and hepatic dysfunction. Stable 127-128.    Right Pancreatic Hydrothorax, stable  Increased dyspnea with CXR on 7/19 with further imaging and thoracentesis confirming a hepatic/pancreatic hydrothorax. Thoracentesis PRN for dyspnea, most recently 9/3.    ESRD due to ATN  Hyperphosphatemia  Stage III RADHA due to ATN from septic shock without recovery for >3 months. Line is tunneled dialysis catheter from hospitals. Nephrology dialyzed on Tu, Th, Sa schedule.  - Will need follow-up and outpatient dialysis  - Sevelemer powder TID with TF for hyperphosphatemia   - Discuss if needs dialysis line replacement      Anemia of chronic illness  Hematochezia, resolved  Hemoperitoneum  Baseline Hgb 17. Running between 6-8 in setting of chronic illness, new ESRD with epocrit per Nephrology, frequent lab draws and procedures. Hematochezia on 8/8 after starting standard intensity heparin, now resolved. Paracentesis on 8/21 with hemoperitoneum. No active bleeding. Required a unit of PRBC on 9/4 in the setting of several fluid boluses.   - Goal hgb > 7.0, trend Hgb     Alcohol Use Disorder  Counseled patient on complete alcohol cessation. Not interested in additional services at this time. Continue daily folic acid and thiamine.  - Ensure patient has resources for treatment at discharge if managing alcohol cessation independently becomes challenging.    Severe malnutrition of chronic illness  Poor oral intake  in the setting of necrotizing pancreatitis. PEG tube placed 7/13, replaced 9/14. Continuous tube feeds with goal of 65 ml/hr with continuous pancreatic enzymes. Transitioning to renal formula.   - Continuous tube feeds     Diet: NPO with tube feeds  DVT Prophylaxis: SCDs  Rdz Catheter: Not present  Central Lines: PRESENT  PICC Triple Lumen 06/28/21 Left-Site Assessment: WDL  CVC Double Lumen Right-Site Assessment: WDL  Code Status: Full Code      Disposition Plan   Expected discharge:7-8 days  recommended to long term care once adequate pain management/ tolerating PO medications and safe disposition plan/ TCU bed available.     The patient's care was discussed with the Attending Physician, Dr. Ibanez.    Zi Wood MD  PGY-2, Internal Medicine   ______________________________________________________________________    Interval History   Sepsis code called evening of 9/22 for BP 84/34 and lactate 2, given albumin 25g x1 and started on midodrine 5mg TID. Pt doing okay today, he continues to report fatigue and lightheadedness which he says is slightly worse than previous but is not new. He is open to talking to palliative care.     Data reviewed today: I reviewed all medications, new labs and imaging results over the last 24 hours. I personally reviewed no images or EKG's today.    Physical Exam   Vital Signs: Temp: 97.6  F (36.4  C) Temp src: Oral BP: 92/41 Pulse: 104   Resp: 20 SpO2: 96 % O2 Device: Nasal cannula with humidification Oxygen Delivery: 2 LPM  Weight: 151 lbs .24 oz  General Appearance: Chronically ill appearing, in no acute distress  Respiratory: normal work of breathing on ambient air  Cardiovascular: regular rate  GI: soft ND, tender to palpation around Gtube site  Skin: erythema around procedures site  Other: Alert, conversant

## 2021-09-23 NOTE — PLAN OF CARE
Care assumed: [id number]   Vitals  Pain   AM vitals. Pt requests not to be disturbed until 930 am       Dilaudid available for pain in abdomen   Scheduled oxy    Activity  Lift    Neuro/Mood  A&Ox 4      Denies numbness and tingling    Cardiac  No signs of acute distress    Respiratory  No signs of acute respiratory distress    GI/  PT on Hemodialysis .  Incontinent to bowel       Diet/ Nutrition  NPO ex ice chips       Skin, Wounds, LDAs REJI skin; Pt refuses both assessment and repositioning   PICC triple lumen   CVC for dialysis   Peg tube; G to suction J for feeding.   Tube feeds at 45mL/hr goal    Plan of Care  Other notes Follow POC     Emesis once overnight. Zofran given.

## 2021-09-24 ENCOUNTER — APPOINTMENT (OUTPATIENT)
Dept: OCCUPATIONAL THERAPY | Facility: CLINIC | Age: 31
End: 2021-09-24
Attending: INTERNAL MEDICINE
Payer: MEDICAID

## 2021-09-24 LAB
BACTERIA PLR CULT: NO GROWTH
ERYTHROCYTE [DISTWIDTH] IN BLOOD BY AUTOMATED COUNT: 15.4 % (ref 10–15)
GLUCOSE BLDC GLUCOMTR-MCNC: 109 MG/DL (ref 70–99)
GLUCOSE BLDC GLUCOMTR-MCNC: 111 MG/DL (ref 70–99)
GRAM STAIN RESULT: NORMAL
GRAM STAIN RESULT: NORMAL
HCT VFR BLD AUTO: 28.4 % (ref 40–53)
HGB BLD-MCNC: 9.3 G/DL (ref 13.3–17.7)
LACTATE SERPL-SCNC: 1.1 MMOL/L (ref 0.7–2)
MAGNESIUM SERPL-MCNC: 2.3 MG/DL (ref 1.6–2.3)
MCH RBC QN AUTO: 31 PG (ref 26.5–33)
MCHC RBC AUTO-ENTMCNC: 32.7 G/DL (ref 31.5–36.5)
MCV RBC AUTO: 95 FL (ref 78–100)
PHOSPHATE SERPL-MCNC: 8.5 MG/DL (ref 2.5–4.5)
PLATELET # BLD AUTO: 253 10E3/UL (ref 150–450)
RBC # BLD AUTO: 3 10E6/UL (ref 4.4–5.9)
WBC # BLD AUTO: 9.4 10E3/UL (ref 4–11)

## 2021-09-24 PROCEDURE — 250N000011 HC RX IP 250 OP 636: Performed by: HOSPITALIST

## 2021-09-24 PROCEDURE — 99232 SBSQ HOSP IP/OBS MODERATE 35: CPT | Mod: GC | Performed by: STUDENT IN AN ORGANIZED HEALTH CARE EDUCATION/TRAINING PROGRAM

## 2021-09-24 PROCEDURE — 90937 HEMODIALYSIS REPEATED EVAL: CPT

## 2021-09-24 PROCEDURE — 83735 ASSAY OF MAGNESIUM: CPT | Performed by: INTERNAL MEDICINE

## 2021-09-24 PROCEDURE — 36592 COLLECT BLOOD FROM PICC: CPT | Performed by: STUDENT IN AN ORGANIZED HEALTH CARE EDUCATION/TRAINING PROGRAM

## 2021-09-24 PROCEDURE — 250N000013 HC RX MED GY IP 250 OP 250 PS 637: Performed by: INTERNAL MEDICINE

## 2021-09-24 PROCEDURE — 85014 HEMATOCRIT: CPT

## 2021-09-24 PROCEDURE — 99207 PR CDG-MDM COMPONENT: MEETS MODERATE - DOWN CODED: CPT | Performed by: STUDENT IN AN ORGANIZED HEALTH CARE EDUCATION/TRAINING PROGRAM

## 2021-09-24 PROCEDURE — C9113 INJ PANTOPRAZOLE SODIUM, VIA: HCPCS | Performed by: INTERNAL MEDICINE

## 2021-09-24 PROCEDURE — 36592 COLLECT BLOOD FROM PICC: CPT | Performed by: INTERNAL MEDICINE

## 2021-09-24 PROCEDURE — 84100 ASSAY OF PHOSPHORUS: CPT

## 2021-09-24 PROCEDURE — 120N000002 HC R&B MED SURG/OB UMMC

## 2021-09-24 PROCEDURE — 634N000001 HC RX 634: Performed by: CLINICAL NURSE SPECIALIST

## 2021-09-24 PROCEDURE — 97530 THERAPEUTIC ACTIVITIES: CPT | Mod: GO

## 2021-09-24 PROCEDURE — 250N000011 HC RX IP 250 OP 636: Performed by: INTERNAL MEDICINE

## 2021-09-24 PROCEDURE — 250N000013 HC RX MED GY IP 250 OP 250 PS 637

## 2021-09-24 PROCEDURE — 258N000003 HC RX IP 258 OP 636: Performed by: CLINICAL NURSE SPECIALIST

## 2021-09-24 PROCEDURE — 83605 ASSAY OF LACTIC ACID: CPT | Performed by: STUDENT IN AN ORGANIZED HEALTH CARE EDUCATION/TRAINING PROGRAM

## 2021-09-24 PROCEDURE — 250N000013 HC RX MED GY IP 250 OP 250 PS 637: Performed by: HOSPITALIST

## 2021-09-24 PROCEDURE — 250N000013 HC RX MED GY IP 250 OP 250 PS 637: Performed by: STUDENT IN AN ORGANIZED HEALTH CARE EDUCATION/TRAINING PROGRAM

## 2021-09-24 PROCEDURE — 250N000011 HC RX IP 250 OP 636

## 2021-09-24 PROCEDURE — 97110 THERAPEUTIC EXERCISES: CPT | Mod: GO

## 2021-09-24 RX ORDER — MIDODRINE HYDROCHLORIDE 5 MG/1
5 TABLET ORAL 3 TIMES DAILY
Status: DISCONTINUED | OUTPATIENT
Start: 2021-09-24 | End: 2021-09-25

## 2021-09-24 RX ADMIN — HYDROXYZINE HYDROCHLORIDE 50 MG: 25 TABLET, FILM COATED ORAL at 10:29

## 2021-09-24 RX ADMIN — PANCRELIPASE 1 CAPSULE: 24000; 76000; 120000 CAPSULE, DELAYED RELEASE PELLETS ORAL at 03:58

## 2021-09-24 RX ADMIN — SODIUM BICARBONATE 325 MG: 325 TABLET ORAL at 20:31

## 2021-09-24 RX ADMIN — Medication 1 PACKET: at 14:01

## 2021-09-24 RX ADMIN — METHYLPHENIDATE HYDROCHLORIDE 5 MG: 5 TABLET ORAL at 12:35

## 2021-09-24 RX ADMIN — HYDROMORPHONE HYDROCHLORIDE 1 MG: 1 SOLUTION ORAL at 14:30

## 2021-09-24 RX ADMIN — ONDANSETRON 4 MG: 2 INJECTION INTRAMUSCULAR; INTRAVENOUS at 07:57

## 2021-09-24 RX ADMIN — PANCRELIPASE 2 CAPSULE: 24000; 76000; 120000 CAPSULE, DELAYED RELEASE PELLETS ORAL at 13:59

## 2021-09-24 RX ADMIN — PROCHLORPERAZINE MALEATE 5 MG: 5 TABLET ORAL at 16:56

## 2021-09-24 RX ADMIN — HYDROMORPHONE HYDROCHLORIDE 0.5 MG: 1 INJECTION, SOLUTION INTRAMUSCULAR; INTRAVENOUS; SUBCUTANEOUS at 12:33

## 2021-09-24 RX ADMIN — HYDROMORPHONE HYDROCHLORIDE 1 MG: 1 SOLUTION ORAL at 01:52

## 2021-09-24 RX ADMIN — Medication 1 PACKET: at 08:01

## 2021-09-24 RX ADMIN — Medication 6 MG: at 00:13

## 2021-09-24 RX ADMIN — ONDANSETRON 4 MG: 2 INJECTION INTRAMUSCULAR; INTRAVENOUS at 13:59

## 2021-09-24 RX ADMIN — SEVELAMER CARBONATE 1.6 G: 800 POWDER, FOR SUSPENSION ORAL at 07:58

## 2021-09-24 RX ADMIN — CALCIUM CARBONATE (ANTACID) CHEW TAB 500 MG 500 MG: 500 CHEW TAB at 10:28

## 2021-09-24 RX ADMIN — SEVELAMER CARBONATE 1.6 G: 800 POWDER, FOR SUSPENSION ORAL at 16:32

## 2021-09-24 RX ADMIN — SEVELAMER CARBONATE 1.6 G: 800 POWDER, FOR SUSPENSION ORAL at 20:30

## 2021-09-24 RX ADMIN — PANTOPRAZOLE SODIUM 40 MG: 40 INJECTION, POWDER, FOR SOLUTION INTRAVENOUS at 07:58

## 2021-09-24 RX ADMIN — PANCRELIPASE 2 CAPSULE: 24000; 76000; 120000 CAPSULE, DELAYED RELEASE PELLETS ORAL at 20:30

## 2021-09-24 RX ADMIN — EPOETIN ALFA-EPBX 5000 UNITS: 10000 INJECTION, SOLUTION INTRAVENOUS; SUBCUTANEOUS at 10:13

## 2021-09-24 RX ADMIN — HYDROMORPHONE HYDROCHLORIDE 1 MG: 1 SOLUTION ORAL at 18:36

## 2021-09-24 RX ADMIN — SODIUM BICARBONATE 325 MG: 325 TABLET ORAL at 00:12

## 2021-09-24 RX ADMIN — HYDROMORPHONE HYDROCHLORIDE 1 MG: 1 SOLUTION ORAL at 08:00

## 2021-09-24 RX ADMIN — Medication: at 08:52

## 2021-09-24 RX ADMIN — Medication 5 ML: at 16:34

## 2021-09-24 RX ADMIN — AMOXICILLIN 500 MG: 400 POWDER, FOR SUSPENSION ORAL at 14:00

## 2021-09-24 RX ADMIN — Medication 1 PACKET: at 20:43

## 2021-09-24 RX ADMIN — ACETAMINOPHEN 325 MG: 325 SOLUTION ORAL at 11:42

## 2021-09-24 RX ADMIN — OXYCODONE HYDROCHLORIDE 5 MG: 5 SOLUTION ORAL at 20:30

## 2021-09-24 RX ADMIN — MIDODRINE HYDROCHLORIDE 5 MG: 5 TABLET ORAL at 20:30

## 2021-09-24 RX ADMIN — OXYCODONE HYDROCHLORIDE 5 MG: 5 SOLUTION ORAL at 12:33

## 2021-09-24 RX ADMIN — METHYLPHENIDATE HYDROCHLORIDE 5 MG: 5 TABLET ORAL at 08:00

## 2021-09-24 RX ADMIN — SODIUM CHLORIDE 250 ML: 9 INJECTION, SOLUTION INTRAVENOUS at 08:51

## 2021-09-24 RX ADMIN — SODIUM BICARBONATE 325 MG: 325 TABLET ORAL at 13:59

## 2021-09-24 RX ADMIN — SODIUM CHLORIDE 300 ML: 9 INJECTION, SOLUTION INTRAVENOUS at 08:51

## 2021-09-24 RX ADMIN — SODIUM BICARBONATE 325 MG: 325 TABLET ORAL at 03:58

## 2021-09-24 RX ADMIN — Medication 5 ML: at 07:57

## 2021-09-24 RX ADMIN — Medication 5 ML: at 21:28

## 2021-09-24 RX ADMIN — ONDANSETRON 4 MG: 2 INJECTION INTRAMUSCULAR; INTRAVENOUS at 20:30

## 2021-09-24 RX ADMIN — FOLIC ACID 1 MG: 1 TABLET ORAL at 08:00

## 2021-09-24 RX ADMIN — PANCRELIPASE 1 CAPSULE: 24000; 76000; 120000 CAPSULE, DELAYED RELEASE PELLETS ORAL at 00:13

## 2021-09-24 RX ADMIN — SEVELAMER CARBONATE 1.6 G: 800 POWDER, FOR SUSPENSION ORAL at 12:34

## 2021-09-24 RX ADMIN — OXYCODONE HYDROCHLORIDE 5 MG: 5 SOLUTION ORAL at 03:58

## 2021-09-24 RX ADMIN — THIAMINE HYDROCHLORIDE 100 MG: 100 INJECTION, SOLUTION INTRAMUSCULAR; INTRAVENOUS at 07:58

## 2021-09-24 RX ADMIN — CYCLOBENZAPRINE 10 MG: 10 TABLET, FILM COATED ORAL at 16:32

## 2021-09-24 ASSESSMENT — ACTIVITIES OF DAILY LIVING (ADL)
ADLS_ACUITY_SCORE: 18

## 2021-09-24 ASSESSMENT — MIFFLIN-ST. JEOR: SCORE: 1581.25

## 2021-09-24 NOTE — PROGRESS NOTES
[facility]    Progress Note - Daniela 3 Service   Date of Admission:  6/28/2021    Changes today:  - Blood cultures 9/23 NGTD  - Will discuss midodrine vs other intervention w/ nephrology (more consistently low BPs)    Assessment & Plan              Dax Villareal is a 30 year old man with hx of alcohol use disorder admitted on 6/28/2021 after recent prolonged admission at [facility] in [place] who has severe acute alcoholic hepatitis c/b HE, ESRD requiring HD, and malnutrition in the setting of acute necrotizing pancreatitis, s/p PEG-J placement on 7/13. He arrived with acute respiratory failure and septic shock that have resolved, and his secondary bacterial peritonitis has been appropriately treated. He requires continued management for ESRD, hepatic encephalopathy, infected pancreatic pseudocyst, nutritional support via PEG-J, and persistent right pleural effusion. Clinically improving after multiple necrosectomies. Increasing leukocytosis and hypotension on 9/2 prompting resumption of IV antibiotics - has been stable since that time. Repeat CT with interval improvement in fluid collections.    Necrotizing alcoholic pancreatitis w/ infected pseudocyst, improving  Hepatic Fluid collection, stable to improving  Secondary Bacterial Peritonitis 2/2 infected pseudocyst with VRE, improving  Septic Shock, resolved  Admitted to the [facility] ICU on 6/28/2021 from [facility] in [place] for septic shock due to necrotizing pancreatitis. CT on admit showing mature collection with enhancing wall measuring [id number]. IR placed perc drain 6/29. Repeat imaging 7/18 revealed walled off necrotic collection amenable to endoscopic transluminal drainage with cystgastrostomy stenting with necrosectomy on 7/21/21. Complete necrosectomy on 8/11. Antibiotics (cefepime and metronidazole) were discontinued on 8/25 after left flank drain removed, re-initiated in the setting of fever on 9/2.  Favor abdominal etiology, repeat CT on 9/7 with interval improvement in fluid collections - will complete 7d course of abx per ID with EOT 9/8.  - Will likely need intermittent antibiotics with fluid collections occasionally with bacterial growth moving forward as to be expected in nec panc  - Pain - scheduled oxycodone q8H and IV dilaudid for dressing changes  - Palliative care following for pain management and goals of care given overall illness    G-tube Fistula  Dax had been having increasing G-tube output and was found to have an erythematous indurated area next to his G-tube insertion. This was found to be his G-tube balloon pushing through the skin. He underwent G-tube replacement on  9/13/21. His old G-tube site has since had large volume output requiring frequent wound care dressing changes.  - 9/20/21 - EGD to close fistula completed by GI  - G tube to light intermittent suction or gravity for 1 week to allow healing of fistula site (until 9/27)  - Wound team consulted, follow their wound care recs    Periapical abscess: L 3rd maxillary molar  Dental Caries  - Dentistry consulted, and discussed with oral surgery  - Amoxicillin for 7d course (9/21-9/27)  - Dental extraction when stable to be transported (while inpatient) to [facility]    Hypovolemia  Patient with substantial daily NG output and poor PO. Also note significant weight loss over the past few weeks. He has needed intermittent boluses of normal saline for euvolemia. Have discussed with renal multiple times - please re-evaluate volume status vs dry weight as he is often hypotensive following fluid removal.  - Weight 78kg on 8/13. Then 55 kg, weight 68.5kg on 9/22. Concern for weight loss vs. targeting dry weight.  - Monitor vitals and output post dialysis, consider fluids prn.  - Consider discussing using midodrine with Nephrology    Gastric Outlet Obstruction  High G tube output due to the cystogastric connection/fluid drainage  noted on 8/5. Per GI, possibly related to gastric outlet from severe abdominal inflammation and is expected. Continues to have mild to moderate NG output - stable to improved. Relieved with draining G tube to gravity when experiencing pain/nausea.    Hyponatremia, stable  Patient with down-trending sodium the week of 8/27; likely due to substantial NG output and low solute intake and hepatic dysfunction. Stable 127-128.    Right Pancreatic Hydrothorax, stable  Increased dyspnea with CXR on 7/19 with further imaging and thoracentesis confirming a hepatic/pancreatic hydrothorax. Thoracentesis PRN for dyspnea, most recently 9/3.    ESRD due to ATN  Hyperphosphatemia  Stage III RADHA due to ATN from septic shock without recovery for >3 months. Line is tunneled dialysis catheter from [facility]. Nephrology dialyzed on Tu, Th, Sa schedule.  - Will need follow-up and outpatient dialysis  - Sevelemer powder TID with TF for hyperphosphatemia   - Discuss if needs dialysis line replacement      Anemia of chronic illness  Hematochezia, resolved  Hemoperitoneum  Baseline Hgb 17. Running between 6-8 in setting of chronic illness, new ESRD with epocrit per Nephrology, frequent lab draws and procedures. Hematochezia on 8/8 after starting standard intensity heparin, now resolved. Paracentesis on 8/21 with hemoperitoneum. No active bleeding. Required a unit of PRBC on 9/4 in the setting of several fluid boluses.   - Goal hgb > 7.0, trend Hgb     Alcohol Use Disorder  Counseled patient on complete alcohol cessation. Not interested in additional services at this time. Continue daily folic acid and thiamine.  - Ensure patient has resources for treatment at discharge if managing alcohol cessation independently becomes challenging.    Severe malnutrition of chronic illness  Poor oral intake in the setting of necrotizing pancreatitis. PEG tube placed 7/13, replaced 9/14. Continuous tube feeds with goal of 65 ml/hr with continuous pancreatic  enzymes. Transitioning to renal formula.   - Continuous tube feeds     Diet: NPO with tube feeds  DVT Prophylaxis: SCDs  Rdz Catheter: Not present  Central Lines: PRESENT  PICC Triple Lumen 06/28/21 Left-Site Assessment: WDL  CVC Double Lumen Right-Site Assessment: WDL  Code Status: Full Code      Disposition Plan   Expected discharge: 7-8 days  recommended to long term care once adequate pain management/ tolerating PO medications and safe disposition plan/ TCU bed available.     The patient's care was discussed with the Attending Physician, Dr. Ibanez.    Marleny Monk MD  PGY-1, Internal Medicine-Pediatrics  ______________________________________________________________________    Interval History   Patient was at dialysis. Upon returning reported continued fatigue and abdominal pain.    Data reviewed today: I reviewed all medications, new labs and imaging results over the last 24 hours. I personally reviewed no images or EKG's today.    Physical Exam   Vital Signs: Temp: (!) 96  F (35.6  C) Temp src: Oral BP: 100/49 Pulse: 100   Resp: 17 SpO2: 96 % O2 Device: Nasal cannula Oxygen Delivery: 3 LPM  Weight: 149 lbs 14.6 oz  General Appearance: Chronically ill appearing, in no acute distress  Respiratory: normal work of breathing on ambient air  Cardiovascular: regular rate  GI: soft ND, tender to palpation around Gtube site  Skin: erythema around procedures site  Other: Alert, conversant, fatigued

## 2021-09-24 NOTE — PLAN OF CARE
This writer had pt from 19:00 to 23:00 09/23/21.    Pt is A/O x 4. Pt is refusing skin checks writer wanted to change abdominal fistula dressing but he declined. TF infusing at goal rate of 45 ML /hour  via J-Tube and it seems pt is tolerating it well with free water aat q 25 ML/hour. G-Tube to LIS.HS . Pt is NPO and he needs to be NPO x 7 days and today is day 3 of NPO. BP continue to ne soft. Continue to monitor pt and follow plan of care.      Problem: Adult Inpatient Plan of Care  Goal: Plan of Care Review  Outcome: No Change     Problem: Adult Inpatient Plan of Care  Goal: Patient-Specific Goal (Individualized)  Outcome: No Change     Problem: Adult Inpatient Plan of Care  Goal: Absence of Hospital-Acquired Illness or Injury  Outcome: No Change     Problem: Adult Inpatient Plan of Care  Goal: Optimal Comfort and Wellbeing  Outcome: No Change     Problem: Fluid Imbalance (Pancreatitis)  Goal: Fluid Balance  Outcome: No Change     Problem: Sleep Disturbance (Delirium)  Goal: Improved Sleep  Outcome: No Change     Problem: Bleeding (Liver Failure)  Goal: Absence of Bleeding  Outcome: No Change     Problem: Infection (Liver Failure)  Goal: Absence of Infection Signs and Symptoms  Outcome: No Change     Problem: Infection  Goal: Absence of Infection Signs and Symptoms  Outcome: No Change

## 2021-09-24 NOTE — PROGRESS NOTES
Nephrology Progress Note  09/24/2021         Dax Villareal is a 30 year old male with h/o ETOH hepatitis, end stage liver disease, RADHA on HD, transferred from [facility] in [place] for septic shock on 6/28/21 for treatment of necrotizing pancreatitis and decompensated liver disease. He was initially admitted to [facility] 5/19/21 and started on RRT 5/26/2021.        Interval History :   Mr Villareal had necrosectomy for 4th time on 8/11 with no more anticipated.  Running HD today, ordered for 1-2L of UF today but can back off or give a bit of fluid if BP's drop at all as his drain output is close to matching intake.  Na low and Phos high consistent with his diet and fluid non-compliance, should correct some with HD today.        Assessment & Recommendations:   ESRD-Cr was 0.8 as recently as 5/19/2021 but now HD dependent for 3 months, started RRT at [facility] prior to transfer to [facility] on 5/26.  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  continuing restorative cares for now, BP's have been stable enough to support HD.  Had GI stenting of pancreas x4.                 -Line is TDC from PTA               -Planning HD on MWF schedule, refuses extra runs, will continue to offer.       Volume status-Anuric, wt at low for his course, not pulling much UF with runs but will continue to try to challenge EDW.      Electrolytes/pH-Na 124, runs chronically low, should correct some on run today.  K 3.4, and bicarb 23 no acute issues.      Ca/phos/pth-Ca 9.3, Mg 1.8, Phos high at 8.5 which is likely due to his PO intake. Doubled Sevelamer this week at 1.6g TID, is on Ca carbonate as well and should come down with HD, following closely.                     Anemia-Hgb low at 9.1, acute management per team, last PRBC's 7/14.  Started on EPO 5k with runs, iron sats 43%      Nutrition-Taking PO, appetite poor lately, has been NPO due to G tube placement  "yesterday, would change to renal formula to limit K and Phos.       Seen and discussed with Dr Ahuja      Recommendations were communicated to primary team via note       SOFIA Marlow CNS  Clinical Nurse Specialist  [phone]    Review of Systems:   I reviewed the following systems:  Gen: No fevers or chills  CV: No CP at rest  Resp: No SOB at rest  GI: No N/V    Physical Exam:   I/O last 3 completed shifts:  In: 1075 [NG/GT:400]  Out: 300 [Urine:50; Drains:250]   /51   Pulse 105   Temp 97.7  F (36.5  C) (Oral)   Resp 17   Ht 1.676 m (5' 6\")   Wt 68 kg (149 lb 14.6 oz)   SpO2 97%   BMI 24.20 kg/m       GENERAL APPEARANCE: Lying in bed, in no distress.    EYES:  scleral icterus, pupils equal  Pulmonary: decreased BS   CV: tachy   - Edema - no LE edema  GI: mild distention, non-tender. + tenderness at G-tube site and generalized abdominal pain.    MS: no evidence of inflammation in joints, no muscle tenderness  SKIN: no rash, warm, dry, no cyanosis, + G tube erythremia   NEURO: alert/interactive  ACCESS: Right TDC with no induration or erythema    Labs:   All labs reviewed by me  Electrolytes/Renal - Recent Labs   Lab Test 09/24/21  0847 09/24/21  0227 09/23/21  1017 09/22/21  0527 09/21/21  0630 09/21/21  0630 09/20/21  1032 09/20/21  0537   NA  --   --  124* 126*  --  129*  --   --    POTASSIUM  --   --  3.4 4.6  --  4.4  --   --    CHLORIDE  --   --  89* 88*  --  93*  --   --    CO2  --   --  23 23 -- 24  --   --    BUN  --   --  51* 71*  --  43*  --   --    CR  --   --  3.17* 3.72*  --  2.69*  --   --    GLC  --  111* 139* 104*   < > 157*   < >  --    JANENE  --   --  9.3 9.5  --  8.8  --   --    MAG 2.3  --  1.8 2.3   < > 2.2   < > 2.6*   PHOS 8.5*  --   --  8.3*  --   --   --  10.5*    < > = values in this interval not displayed.       CBC -   Recent Labs   Lab Test 09/24/21  0847 09/23/21  1030 09/22/21  0527   WBC 9.4 11.3* 8.8   HGB 9.3* 9.1* 9.0*    246 260       LFTs -   Recent " Labs   Lab Test 09/04/21  0414 09/03/21  0523 09/02/21  1730   ALKPHOS 81 95 120   BILITOTAL 1.4* 2.0* 2.2*   ALT 29 31 40   AST 38 39 57*   PROTTOTAL 8.8 9.2* 10.7*   ALBUMIN 2.9* 2.3* 2.7*       Iron Panel -   Recent Labs   Lab Test 08/14/21 2055 07/19/21  0632   IRON 28* 31*   IRONSAT 21 43   JERRELL 2,186*  --            Current Medications:    amoxicillin  500 mg Oral Q24H     sodium bicarbonate  325 mg Per Feeding Tube Q4H    And     amylase-lipase-protease  1-2 capsule Per Feeding Tube Q4H     amylase-lipase-protease  2 capsule Per G Tube TID w/meals     B and C vitamin Complex with folic acid  5 mL Per Feeding Tube Daily     fiber modular (NUTRISOURCE FIBER)  1 packet Per Feeding Tube TID     folic acid  1 mg Oral or Feeding Tube Daily     heparin lock flush  5-20 mL Intracatheter Q24H     melatonin  6 mg Oral or Feeding Tube At Bedtime     menthol   Transdermal Q8H     methylphenidate  5 mg Per Feeding Tube BID     ondansetron  4 mg Oral TID    Or     ondansetron  4 mg Intravenous TID     oxyCODONE  5 mg Oral or Feeding Tube Q8H     pantoprazole (PROTONIX) IV  40 mg Intravenous Daily with breakfast     protein modular  1 packet Per Feeding Tube Daily     sevelamer carbonate (RENVELA)  1.6 g Oral or Feeding Tube TID     sodium chloride (PF)  9 mL Intracatheter During Dialysis/CRRT (from stock)     sodium chloride (PF)  9 mL Intracatheter During Dialysis/CRRT (from stock)     vitamin B1  100 mg Oral or Feeding Tube Daily    Or     thiamine  100 mg Intravenous Daily       - MEDICATION INSTRUCTIONS -       dextrose       heparin (porcine) 1,000 Units/hr (08/24/21 0907)

## 2021-09-24 NOTE — PROGRESS NOTES
"BP (!) 94/30   Pulse 102   Temp 97.7  F (36.5  C)   Resp 18   Ht 1.676 m (5' 6\")   Wt 68.3 kg (150 lb 11 oz)   SpO2 99%   BMI 24.32 kg/m    Continues to have pain in abdomen at wound site. Available prn analgesics given. Dressing changed, some brown foul smelling drainage present. Cleansed and redressed. Skin is reddened around site. Intermittent nausea persists, given zofran, compazine. Reluctant to turn, spends most of time back. Skin is intact aside from wound. G-tube is to LIS with 750ml output. NPO except for occasional ice chips. BMx1. Tube feeds infusing at 45ml/hour.   "

## 2021-09-24 NOTE — PLAN OF CARE
"BP 93/40 (BP Location: Right arm)   Pulse 97   Temp 97.4  F (36.3  C) (Oral)   Resp 17   Ht 1.676 m (5' 6\")   Wt 68 kg (149 lb 14.6 oz)   SpO2 97%   BMI 24.20 kg/m      Assumed care 0700 to 1930  Pt rounded on hourly  Dallin: alert and oriented Pt BP soft provider aware. Pt lethargic  Pain: pt has pain in abd scheduled oxycodone given and PRN dilaudid given  GI/: Denies nausea. Bowel sounds present. Pt does not make urine on hemodialysis. Pt had 2 BM. Pt NPO ice chips. TF running at 45 ml/hr at goal rate  Cardiac:  tachycardic  Respiratory: denies SOB lung sounds clear bilaterally  Skin: Pt has mepilex to coccyx pt refused dressing change. Pt refused dressing change to abd old peg tube site. Pt educated on the importance of wound management. Pt refused repositioning, only repositioned 3 times during the shift. Pt educated on the importance of moving to prevent more skin breakdown.   Lines: PICC heparin locked  Assist level: Lift or 2 assist in bed  Will continue to monitor and follow plan of care.   Plan:     Call light in reach, Pt able to make needs known, Will continue to monitor and follow plan of care.     "

## 2021-09-24 NOTE — PROGRESS NOTES
HEMODIALYSIS TREATMENT NOTE    Date: 9/24/2021  Time: 12:07 PM    Data:  Pre Wt: 68 kg (149 lb 14.6 oz)   Desired Wt: 66 kg   Post Wt: 68.3 kg (150 lb 11 oz) (estimate)  Weight change: -0.35 kg  Ultrafiltration - Post Run Net Total Removed (mL): 0 mL  Vascular Access Status: CVC  patent  Dialyzer Rinse: Streaked  Total Blood Volume Processed: 69.2 L   Total Dialysis (Treatment) Time: 3   Dialysate Bath: K 4, Ca 3  Heparin: None    Lab:   Yes    Interventions:     09/24/21 0900 09/24/21 0910 09/24/21 1030   Machine Settings and Measurements   Comments 250ml NS, uf off per nephrologist d/t soft BP. uf remains off tums & atarax given perr pt req; see MAR       Assessment:  3hr TX, (Rx 1-2L), pt post TX net positive 350ml.  Goal reduced d/t soft BP, MD notified and aware; okay for clearance only if needed per nephrologist.  250ml NS given during TX d/t soft BP.  Tylenol, Atarax, Tums and Epogen given during TX.  CVC dressing changed.  CVC saline locked with clearguards in place. Handoff report given to PCN.     Plan:    Per renal

## 2021-09-24 NOTE — PLAN OF CARE
RN assumed cares at [phone]     Temp: (!) 96  F (35.6  C) Temp src: Oral BP: 100/49 Pulse: 100   Resp: 17 SpO2: 96 % O2 Device: Nasal cannula Oxygen Delivery: 3 LPM    Neuro: A&O x4. Pt is lethargic, speech is slow. Able to make needs known & follow commands. Pt endorses numbness in BLE.   Cardiac: Intermittently tachycardic. Murmur noted.   Respiratory: LS clear & equal bilaterally.   GI/: HD pt. Abdomen is distended. Pt denies nausea this shift. PEG tube, TF infusing at goal rate of 45 mL/hr through J tube, G tube connected to low intermittent suction. One incontinent BM this shift, small amount of urinary incontinence, linen change completed.   Skin: Jaundiced skin. Mepilex in place on coccyx. Pt refused repositioning throughout night, education importance provided.   IV/Drains: PEG tube WDL, R abdominal drain WDL, L PICC WDL, R CVC WDL.  Activity: Assist of two in bed.  Nutrition: NPO x 7 days, 9/24/21 will be day 3.   Pain: Pt c/o 8/10 abdominal pain relieved w/ prn dilaudid & scheduled oxycodone.     Plan of Care: Pt able to rest comfortably majority of night. BP are soft, MD aware, no change in POC. Linen change completed for fecal incontinence. Continue to encourage repositioning & follow POC.

## 2021-09-25 LAB
ANION GAP SERPL CALCULATED.3IONS-SCNC: 10 MMOL/L (ref 3–14)
BUN SERPL-MCNC: 39 MG/DL (ref 7–30)
CALCIUM SERPL-MCNC: 9.6 MG/DL (ref 8.5–10.1)
CHLORIDE BLD-SCNC: 92 MMOL/L (ref 94–109)
CO2 SERPL-SCNC: 24 MMOL/L (ref 20–32)
CREAT SERPL-MCNC: 2.72 MG/DL (ref 0.66–1.25)
CRP SERPL-MCNC: 54 MG/L (ref 0–8)
GFR SERPL CREATININE-BSD FRML MDRD: 30 ML/MIN/1.73M2
GLUCOSE BLD-MCNC: 123 MG/DL (ref 70–99)
GLUCOSE BLDC GLUCOMTR-MCNC: 104 MG/DL (ref 70–99)
GLUCOSE BLDC GLUCOMTR-MCNC: 105 MG/DL (ref 70–99)
HOLD SPECIMEN: NORMAL
HOLD SPECIMEN: NORMAL
MAGNESIUM SERPL-MCNC: 2.1 MG/DL (ref 1.6–2.3)
POTASSIUM BLD-SCNC: 3 MMOL/L (ref 3.4–5.3)
POTASSIUM BLD-SCNC: 3.3 MMOL/L (ref 3.4–5.3)
SODIUM SERPL-SCNC: 126 MMOL/L (ref 133–144)

## 2021-09-25 PROCEDURE — 250N000013 HC RX MED GY IP 250 OP 250 PS 637

## 2021-09-25 PROCEDURE — 250N000013 HC RX MED GY IP 250 OP 250 PS 637: Performed by: STUDENT IN AN ORGANIZED HEALTH CARE EDUCATION/TRAINING PROGRAM

## 2021-09-25 PROCEDURE — 250N000013 HC RX MED GY IP 250 OP 250 PS 637: Performed by: INTERNAL MEDICINE

## 2021-09-25 PROCEDURE — 250N000013 HC RX MED GY IP 250 OP 250 PS 637: Performed by: HOSPITALIST

## 2021-09-25 PROCEDURE — 86140 C-REACTIVE PROTEIN: CPT | Performed by: INTERNAL MEDICINE

## 2021-09-25 PROCEDURE — 36592 COLLECT BLOOD FROM PICC: CPT

## 2021-09-25 PROCEDURE — 258N000003 HC RX IP 258 OP 636

## 2021-09-25 PROCEDURE — 120N000002 HC R&B MED SURG/OB UMMC

## 2021-09-25 PROCEDURE — 250N000011 HC RX IP 250 OP 636: Performed by: INTERNAL MEDICINE

## 2021-09-25 PROCEDURE — 84132 ASSAY OF SERUM POTASSIUM: CPT

## 2021-09-25 PROCEDURE — 250N000011 HC RX IP 250 OP 636: Performed by: HOSPITALIST

## 2021-09-25 PROCEDURE — C9113 INJ PANTOPRAZOLE SODIUM, VIA: HCPCS | Performed by: INTERNAL MEDICINE

## 2021-09-25 PROCEDURE — 83735 ASSAY OF MAGNESIUM: CPT | Performed by: INTERNAL MEDICINE

## 2021-09-25 PROCEDURE — 80048 BASIC METABOLIC PNL TOTAL CA: CPT | Performed by: INTERNAL MEDICINE

## 2021-09-25 PROCEDURE — 36592 COLLECT BLOOD FROM PICC: CPT | Performed by: INTERNAL MEDICINE

## 2021-09-25 PROCEDURE — 99233 SBSQ HOSP IP/OBS HIGH 50: CPT | Mod: GC | Performed by: STUDENT IN AN ORGANIZED HEALTH CARE EDUCATION/TRAINING PROGRAM

## 2021-09-25 PROCEDURE — 250N000011 HC RX IP 250 OP 636

## 2021-09-25 RX ORDER — MIDODRINE HYDROCHLORIDE 5 MG/1
10 TABLET ORAL 3 TIMES DAILY
Status: DISCONTINUED | OUTPATIENT
Start: 2021-09-25 | End: 2021-10-02

## 2021-09-25 RX ORDER — POTASSIUM CHLORIDE 1.5 G/1.58G
20 POWDER, FOR SOLUTION ORAL ONCE
Status: COMPLETED | OUTPATIENT
Start: 2021-09-25 | End: 2021-09-25

## 2021-09-25 RX ADMIN — Medication 6 MG: at 00:43

## 2021-09-25 RX ADMIN — Medication 5 ML: at 07:02

## 2021-09-25 RX ADMIN — PANCRELIPASE 2 CAPSULE: 24000; 76000; 120000 CAPSULE, DELAYED RELEASE PELLETS ORAL at 17:18

## 2021-09-25 RX ADMIN — THIAMINE HCL TAB 100 MG 100 MG: 100 TAB at 08:06

## 2021-09-25 RX ADMIN — HYDROMORPHONE HYDROCHLORIDE 1 MG: 1 SOLUTION ORAL at 22:59

## 2021-09-25 RX ADMIN — METHYLPHENIDATE HYDROCHLORIDE 5 MG: 5 TABLET ORAL at 13:47

## 2021-09-25 RX ADMIN — Medication 1 PACKET: at 08:07

## 2021-09-25 RX ADMIN — Medication 5 ML: at 15:17

## 2021-09-25 RX ADMIN — MIDODRINE HYDROCHLORIDE 5 MG: 5 TABLET ORAL at 08:08

## 2021-09-25 RX ADMIN — Medication 6 MG: at 22:59

## 2021-09-25 RX ADMIN — SEVELAMER CARBONATE 1.6 G: 800 POWDER, FOR SUSPENSION ORAL at 17:17

## 2021-09-25 RX ADMIN — ACETAMINOPHEN 325 MG: 325 SOLUTION ORAL at 23:57

## 2021-09-25 RX ADMIN — AMOXICILLIN 500 MG: 400 POWDER, FOR SUSPENSION ORAL at 13:58

## 2021-09-25 RX ADMIN — SODIUM CHLORIDE 250 ML: 9 INJECTION, SOLUTION INTRAVENOUS at 13:58

## 2021-09-25 RX ADMIN — HYDROMORPHONE HYDROCHLORIDE 1 MG: 1 SOLUTION ORAL at 17:16

## 2021-09-25 RX ADMIN — ONDANSETRON 4 MG: 2 INJECTION INTRAMUSCULAR; INTRAVENOUS at 07:26

## 2021-09-25 RX ADMIN — HYDROMORPHONE HYDROCHLORIDE 1 MG: 1 SOLUTION ORAL at 06:24

## 2021-09-25 RX ADMIN — POTASSIUM CHLORIDE 20 MEQ: 1.5 POWDER, FOR SOLUTION ORAL at 13:46

## 2021-09-25 RX ADMIN — SODIUM BICARBONATE 325 MG: 325 TABLET ORAL at 00:43

## 2021-09-25 RX ADMIN — SODIUM BICARBONATE 325 MG: 325 TABLET ORAL at 20:38

## 2021-09-25 RX ADMIN — Medication 5 ML: at 12:43

## 2021-09-25 RX ADMIN — HYDROXYZINE HYDROCHLORIDE 50 MG: 25 TABLET, FILM COATED ORAL at 23:57

## 2021-09-25 RX ADMIN — ONDANSETRON 4 MG: 2 INJECTION INTRAMUSCULAR; INTRAVENOUS at 20:39

## 2021-09-25 RX ADMIN — MIDODRINE HYDROCHLORIDE 10 MG: 5 TABLET ORAL at 21:25

## 2021-09-25 RX ADMIN — PANTOPRAZOLE SODIUM 40 MG: 40 INJECTION, POWDER, FOR SOLUTION INTRAVENOUS at 08:06

## 2021-09-25 RX ADMIN — Medication 1 PACKET: at 20:38

## 2021-09-25 RX ADMIN — FOLIC ACID 1 MG: 1 TABLET ORAL at 08:06

## 2021-09-25 RX ADMIN — SODIUM BICARBONATE 325 MG: 325 TABLET ORAL at 10:50

## 2021-09-25 RX ADMIN — OXYCODONE HYDROCHLORIDE 5 MG: 5 SOLUTION ORAL at 04:25

## 2021-09-25 RX ADMIN — PANCRELIPASE 2 CAPSULE: 24000; 76000; 120000 CAPSULE, DELAYED RELEASE PELLETS ORAL at 20:38

## 2021-09-25 RX ADMIN — PANCRELIPASE 2 CAPSULE: 24000; 76000; 120000 CAPSULE, DELAYED RELEASE PELLETS ORAL at 00:43

## 2021-09-25 RX ADMIN — ACETAMINOPHEN 325 MG: 325 SOLUTION ORAL at 15:17

## 2021-09-25 RX ADMIN — SEVELAMER CARBONATE 1.6 G: 800 POWDER, FOR SUSPENSION ORAL at 21:25

## 2021-09-25 RX ADMIN — OXYCODONE HYDROCHLORIDE 5 MG: 5 SOLUTION ORAL at 13:46

## 2021-09-25 RX ADMIN — ACETAMINOPHEN 325 MG: 325 SOLUTION ORAL at 06:19

## 2021-09-25 RX ADMIN — HYDROXYZINE HYDROCHLORIDE 50 MG: 25 TABLET, FILM COATED ORAL at 15:17

## 2021-09-25 RX ADMIN — PANCRELIPASE 2 CAPSULE: 24000; 76000; 120000 CAPSULE, DELAYED RELEASE PELLETS ORAL at 04:26

## 2021-09-25 RX ADMIN — SODIUM BICARBONATE 325 MG: 325 TABLET ORAL at 17:17

## 2021-09-25 RX ADMIN — ONDANSETRON 4 MG: 2 INJECTION INTRAMUSCULAR; INTRAVENOUS at 13:47

## 2021-09-25 RX ADMIN — METHYLPHENIDATE HYDROCHLORIDE 5 MG: 5 TABLET ORAL at 08:08

## 2021-09-25 RX ADMIN — SODIUM BICARBONATE 325 MG: 325 TABLET ORAL at 04:25

## 2021-09-25 RX ADMIN — HYDROMORPHONE HYDROCHLORIDE 1 MG: 1 SOLUTION ORAL at 00:43

## 2021-09-25 RX ADMIN — HYDROMORPHONE HYDROCHLORIDE 0.5 MG: 1 INJECTION, SOLUTION INTRAMUSCULAR; INTRAVENOUS; SUBCUTANEOUS at 15:17

## 2021-09-25 RX ADMIN — HYDROMORPHONE HYDROCHLORIDE 1 MG: 1 SOLUTION ORAL at 11:40

## 2021-09-25 RX ADMIN — Medication 5 ML: at 08:06

## 2021-09-25 RX ADMIN — MIDODRINE HYDROCHLORIDE 5 MG: 5 TABLET ORAL at 13:46

## 2021-09-25 RX ADMIN — PANCRELIPASE 2 CAPSULE: 24000; 76000; 120000 CAPSULE, DELAYED RELEASE PELLETS ORAL at 10:50

## 2021-09-25 RX ADMIN — SEVELAMER CARBONATE 1.6 G: 800 POWDER, FOR SUSPENSION ORAL at 08:07

## 2021-09-25 RX ADMIN — Medication 1 PACKET: at 13:56

## 2021-09-25 RX ADMIN — OXYCODONE HYDROCHLORIDE 5 MG: 5 SOLUTION ORAL at 20:39

## 2021-09-25 ASSESSMENT — ACTIVITIES OF DAILY LIVING (ADL)
ADLS_ACUITY_SCORE: 18

## 2021-09-25 ASSESSMENT — MIFFLIN-ST. JEOR: SCORE: 1547.75

## 2021-09-25 NOTE — PROGRESS NOTES
[facility]    Progress Note - Daniela 3 Service  Date of Admission:  6/28/2021    Changes today:  - Blood cultures 9/23 NGTD  - 4+L of gastric output in last 24h, likely contributing to his hypokalemia and hypotension  - 20mEq potassium chloride per feeding tube  - NS 250ml bolus  - Increased midodrine to 10 mg TID  - Will follow-up with palliative on Monday for recs for 3rd line anti-emetic prn    Assessment & Plan              Dax Villareal is a 30 year old man with hx of alcohol use disorder admitted on 6/28/2021 after recent prolonged admission at [facility] in [place] who has severe acute alcoholic hepatitis c/b HE, ESRD requiring HD, and malnutrition in the setting of acute necrotizing pancreatitis, s/p PEG-J placement on 7/13. He arrived with acute respiratory failure and septic shock that have resolved, and his secondary bacterial peritonitis has been appropriately treated. He requires continued management for ESRD, hepatic encephalopathy, infected pancreatic pseudocyst, nutritional support via PEG-J, and persistent right pleural effusion. Clinically improving after multiple necrosectomies. Increasing leukocytosis and hypotension on 9/2 prompting resumption of IV antibiotics - has been stable since that time. Repeat CT with interval improvement in fluid collections.    Necrotizing alcoholic pancreatitis w/ infected pseudocyst, improving  Hepatic Fluid collection, stable to improving  Secondary Bacterial Peritonitis 2/2 infected pseudocyst with VRE, improving  Septic Shock, resolved  Admitted to the [facility] ICU on 6/28/2021 from [facility] in [place] for septic shock due to necrotizing pancreatitis. CT on admit showing mature collection with enhancing wall measuring ~[id number]. IR placed perc drain 6/29. Repeat imaging 7/18 revealed walled off necrotic collection amenable to endoscopic transluminal drainage with cystgastrostomy stenting with necrosectomy on  7/21/21. Complete necrosectomy on 8/11. Antibiotics (cefepime and metronidazole) were discontinued on 8/25 after left flank drain removed, re-initiated in the setting of fever on 9/2. Favor abdominal etiology, repeat CT on 9/7 with interval improvement in fluid collections - will complete 7d course of abx per ID with EOT 9/8.  - Will likely need intermittent antibiotics with fluid collections occasionally with bacterial growth moving forward as to be expected in nec panc  - Pain - scheduled oxycodone q8H and IV dilaudid for dressing changes  - Palliative care following for pain management and goals of care given overall illness    G-tube Fistula  Dax had been having increasing G-tube output and was found to have an erythematous indurated area next to his G-tube insertion. This was found to be his G-tube balloon pushing through the skin. He underwent G-tube replacement on  9/13/21. His old G-tube site has since had large volume output requiring frequent wound care dressing changes.  - 9/20/21 - EGD to close fistula completed by GI  - G tube to light intermittent suction or gravity for 1 week to allow healing of fistula site (until 9/27)  - Wound team consulted, follow their wound care recs    Periapical abscess: L 3rd maxillary molar  Dental Caries  - Dentistry consulted, and discussed with oral surgery  - Amoxicillin for 7d course (9/21-9/27)  - Dental extraction when stable to be transported (while inpatient) to [facility]    Hypovolemia  Patient with substantial daily NG output and poor PO. Also note significant weight loss over the past few weeks. He has needed intermittent boluses of normal saline for euvolemia. Have discussed with renal multiple times - please re-evaluate volume status vs dry weight as he is often hypotensive following fluid removal.  - Weight 78kg on 8/13. Then 55 kg, weight 68.5kg on 9/22. Concern for weight loss vs. targeting dry weight.  - Monitor vitals and output post  dialysis, consider fluids prn.  - Consider discussing using midodrine with Nephrology    Gastric Outlet Obstruction  High G tube output due to the cystogastric connection/fluid drainage noted on 8/5. Per GI, possibly related to gastric outlet from severe abdominal inflammation and is expected. Continues to have mild to moderate NG output - stable to improved. Relieved with draining G tube to gravity when experiencing pain/nausea.    Hyponatremia, stable  Patient with down-trending sodium the week of 8/27; likely due to substantial NG output and low solute intake and hepatic dysfunction. Stable 127-128.    Right Pancreatic Hydrothorax, stable  Increased dyspnea with CXR on 7/19 with further imaging and thoracentesis confirming a hepatic/pancreatic hydrothorax. Thoracentesis PRN for dyspnea, most recently 9/3.    ESRD due to ATN  Hyperphosphatemia  Stage III RADHA due to ATN from septic shock without recovery for >3 months. Line is tunneled dialysis catheter from [facility]. Nephrology dialyzed on Tu, Th, Sa schedule.  - Will need follow-up and outpatient dialysis  - Sevelemer powder TID with TF for hyperphosphatemia   - Discuss if needs dialysis line replacement      Anemia of chronic illness  Hematochezia, resolved  Hemoperitoneum  Baseline Hgb 17. Running between 6-8 in setting of chronic illness, new ESRD with epocrit per Nephrology, frequent lab draws and procedures. Hematochezia on 8/8 after starting standard intensity heparin, now resolved. Paracentesis on 8/21 with hemoperitoneum. No active bleeding. Required a unit of PRBC on 9/4 in the setting of several fluid boluses.   - Goal hgb > 7.0, trend Hgb     Alcohol Use Disorder  Counseled patient on complete alcohol cessation. Not interested in additional services at this time. Continue daily folic acid and thiamine.  - Ensure patient has resources for treatment at discharge if managing alcohol cessation independently becomes challenging.    Severe malnutrition of chronic  illness  Poor oral intake in the setting of necrotizing pancreatitis. PEG tube placed 7/13, replaced 9/14. Continuous tube feeds with goal of 65 ml/hr with continuous pancreatic enzymes. Transitioning to renal formula.   - Continuous tube feeds     Diet: NPO with tube feeds  DVT Prophylaxis: SCDs  Rdz Catheter: Not present  Central Lines: PRESENT  CVC Double Lumen Right-Site Assessment: WDL  Code Status: Full Code      Disposition Plan   Expected discharge: 7-8 days  recommended to long term care once adequate pain management/ tolerating PO medications and safe disposition plan/ TCU bed available.     The patient's care was discussed with the Attending Physician, Dr. Ibanez.    Marleny Monk MD  PGY-1, Internal Medicine-Pediatrics  ______________________________________________________________________    Interval History   Patient reports he continues to feel tired. He notes his mouth feels dry.    Data reviewed today: I reviewed all medications, new labs and imaging results over the last 24 hours. I personally reviewed no images or EKG's today.    Physical Exam   Vital Signs: Temp: (!) 96  F (35.6  C) Temp src: Oral BP: (!) 99/36 (MAP 67) Pulse: 106   Resp: 18 SpO2: 94 % O2 Device: Nasal cannula Oxygen Delivery: 4 LPM  Weight: 150 lbs 10.95 oz  General Appearance: Chronically ill appearing, in no acute distress  Respiratory: normal work of breathing on ambient air  Cardiovascular: regular rate  GI: soft ND, tender to palpation around Gtube site  Skin: erythema around procedures site  Other: Alert, conversant, fatigued

## 2021-09-25 NOTE — PROVIDER NOTIFICATION
.Provider notified (name): Raegan Alcantara  Reason for notification: 5b 36 KR #[id number] Linda RN pt BP 89/31, MAP 57, . midodrine just administered. any other interventions at this time? thanks!  Recommendation/request given to provider:  Response from provider: provider informed pt at bedside- continue to monitor BP's. No new orders at this time, lactic acid pending and trend BP's- considering albumin depending on these factors.

## 2021-09-25 NOTE — PROGRESS NOTES
"/52 (BP Location: Right arm)   Pulse 97   Temp 96.9  F (36.1  C) (Oral)   Resp 18   Ht 1.676 m (5' 6\")   Wt 65 kg (143 lb 4.8 oz)   SpO2 94%   BMI 23.13 kg/m    Pain and nausea well controlled with available meds. Continues to have intermittent episodes of hypotension, dizziness, nausea. Resolve on their own, or with repositioning. Output from G-tube (to LIS) total 1600ml from 7am to 7pm. Tube feeds infusing at 45ml/hour and he is tolerating. Received 250ml IV bolus. More agreeable to repositioning today. Dressing to abdomen changed and wound cleansed - it looks better than yesterday. BMx2. K 3.3 this AM, received 20mEq of potassium. Will recheck in AM. (3.0 level was before replacment). Continue with cares.   "

## 2021-09-25 NOTE — PLAN OF CARE
".Assumed cares [id number]. Pt rounded on hourly.     .BP (!) 99/36   Pulse 106   Temp (!) 96  F (35.6  C) (Oral)   Resp 18   Ht 1.676 m (5' 6\")   Wt 68.3 kg (150 lb 11 oz)   SpO2 94%   BMI 24.32 kg/m      Pain: endorses generalized back and incisional pain at abdomen. PRN dilaudid and scheduled oxycodone administered with relief.    Neuro: Aox4, able to make needs known.   Resp: 4 L O2 via nasal canula. dyspnea on exertion. Denies SOB at rest.   Cardiac: tachycardic with -110's. Soft BP's and MD's aware. Denies chest pains.   Skin: abdominal incision dressing C/D/I/. Blanchable redness on coccyx.  GI/: intermittent nausea- scheduled Zofran administered with relief. Anuria. On HD.   Nutrition: NPO. TF @ 45 mL/hr continuous, pt tolerating well. G tube to LIS with large output.   Activity: assist x2. Turned q2 as pt allows- intermittently refusing despite education.   Access: triple lumen PICC.    Events: triggered sepsis BPA due to tachycardia and hypotension. Lactic resulted at 1.1. MD's paged due to hypotension, please see provider notification note. MAP goal >60 and pt has met this goal this shift.     Plan: continue to follow plan of care and notify MD with changes in condition.         "

## 2021-09-26 LAB
ANION GAP SERPL CALCULATED.3IONS-SCNC: 13 MMOL/L (ref 3–14)
BUN SERPL-MCNC: 66 MG/DL (ref 7–30)
CALCIUM SERPL-MCNC: 10.5 MG/DL (ref 8.5–10.1)
CHLORIDE BLD-SCNC: 88 MMOL/L (ref 94–109)
CO2 SERPL-SCNC: 19 MMOL/L (ref 20–32)
CREAT SERPL-MCNC: 4.44 MG/DL (ref 0.66–1.25)
CRP SERPL-MCNC: 47 MG/L (ref 0–8)
ERYTHROCYTE [DISTWIDTH] IN BLOOD BY AUTOMATED COUNT: 15.6 % (ref 10–15)
GFR SERPL CREATININE-BSD FRML MDRD: 16 ML/MIN/1.73M2
GLUCOSE BLD-MCNC: 87 MG/DL (ref 70–99)
GLUCOSE BLDC GLUCOMTR-MCNC: 102 MG/DL (ref 70–99)
HCT VFR BLD AUTO: 28.8 % (ref 40–53)
HGB BLD-MCNC: 9.3 G/DL (ref 13.3–17.7)
LACTATE SERPL-SCNC: 1.1 MMOL/L (ref 0.7–2)
MAGNESIUM SERPL-MCNC: 2.3 MG/DL (ref 1.6–2.3)
MCH RBC QN AUTO: 30.5 PG (ref 26.5–33)
MCHC RBC AUTO-ENTMCNC: 32.3 G/DL (ref 31.5–36.5)
MCV RBC AUTO: 94 FL (ref 78–100)
PHOSPHATE SERPL-MCNC: 6.2 MG/DL (ref 2.5–4.5)
PLATELET # BLD AUTO: 286 10E3/UL (ref 150–450)
POTASSIUM BLD-SCNC: 3.1 MMOL/L (ref 3.4–5.3)
POTASSIUM BLD-SCNC: 4.6 MMOL/L (ref 3.4–5.3)
PROCALCITONIN SERPL-MCNC: 2.34 NG/ML
RBC # BLD AUTO: 3.05 10E6/UL (ref 4.4–5.9)
SODIUM SERPL-SCNC: 120 MMOL/L (ref 133–144)
SODIUM SERPL-SCNC: 123 MMOL/L (ref 133–144)
WBC # BLD AUTO: 11.2 10E3/UL (ref 4–11)

## 2021-09-26 PROCEDURE — 84145 PROCALCITONIN (PCT): CPT

## 2021-09-26 PROCEDURE — 250N000011 HC RX IP 250 OP 636: Performed by: STUDENT IN AN ORGANIZED HEALTH CARE EDUCATION/TRAINING PROGRAM

## 2021-09-26 PROCEDURE — 250N000011 HC RX IP 250 OP 636

## 2021-09-26 PROCEDURE — 83605 ASSAY OF LACTIC ACID: CPT | Performed by: STUDENT IN AN ORGANIZED HEALTH CARE EDUCATION/TRAINING PROGRAM

## 2021-09-26 PROCEDURE — 250N000013 HC RX MED GY IP 250 OP 250 PS 637

## 2021-09-26 PROCEDURE — 84132 ASSAY OF SERUM POTASSIUM: CPT

## 2021-09-26 PROCEDURE — 84100 ASSAY OF PHOSPHORUS: CPT

## 2021-09-26 PROCEDURE — C9113 INJ PANTOPRAZOLE SODIUM, VIA: HCPCS | Performed by: INTERNAL MEDICINE

## 2021-09-26 PROCEDURE — 250N000013 HC RX MED GY IP 250 OP 250 PS 637: Performed by: INTERNAL MEDICINE

## 2021-09-26 PROCEDURE — 83735 ASSAY OF MAGNESIUM: CPT | Performed by: INTERNAL MEDICINE

## 2021-09-26 PROCEDURE — 250N000011 HC RX IP 250 OP 636: Performed by: INTERNAL MEDICINE

## 2021-09-26 PROCEDURE — 999N000044 HC STATISTIC CVC DRESSING CHANGE

## 2021-09-26 PROCEDURE — 85027 COMPLETE CBC AUTOMATED: CPT

## 2021-09-26 PROCEDURE — 120N000002 HC R&B MED SURG/OB UMMC

## 2021-09-26 PROCEDURE — 99233 SBSQ HOSP IP/OBS HIGH 50: CPT | Mod: GC | Performed by: STUDENT IN AN ORGANIZED HEALTH CARE EDUCATION/TRAINING PROGRAM

## 2021-09-26 PROCEDURE — 36592 COLLECT BLOOD FROM PICC: CPT

## 2021-09-26 PROCEDURE — 250N000013 HC RX MED GY IP 250 OP 250 PS 637: Performed by: HOSPITALIST

## 2021-09-26 PROCEDURE — 250N000013 HC RX MED GY IP 250 OP 250 PS 637: Performed by: STUDENT IN AN ORGANIZED HEALTH CARE EDUCATION/TRAINING PROGRAM

## 2021-09-26 PROCEDURE — 250N000011 HC RX IP 250 OP 636: Performed by: HOSPITALIST

## 2021-09-26 PROCEDURE — 86140 C-REACTIVE PROTEIN: CPT

## 2021-09-26 PROCEDURE — 84295 ASSAY OF SERUM SODIUM: CPT

## 2021-09-26 PROCEDURE — 36592 COLLECT BLOOD FROM PICC: CPT | Performed by: STUDENT IN AN ORGANIZED HEALTH CARE EDUCATION/TRAINING PROGRAM

## 2021-09-26 RX ORDER — POTASSIUM CHLORIDE 1.5 G/1.58G
40 POWDER, FOR SOLUTION ORAL ONCE
Status: COMPLETED | OUTPATIENT
Start: 2021-09-26 | End: 2021-09-26

## 2021-09-26 RX ADMIN — SODIUM BICARBONATE 325 MG: 325 TABLET ORAL at 08:38

## 2021-09-26 RX ADMIN — PANTOPRAZOLE SODIUM 40 MG: 40 INJECTION, POWDER, FOR SOLUTION INTRAVENOUS at 08:38

## 2021-09-26 RX ADMIN — SODIUM BICARBONATE 325 MG: 325 TABLET ORAL at 00:27

## 2021-09-26 RX ADMIN — SODIUM BICARBONATE 325 MG: 325 TABLET ORAL at 21:25

## 2021-09-26 RX ADMIN — HYDROMORPHONE HYDROCHLORIDE 0.5 MG: 1 INJECTION, SOLUTION INTRAMUSCULAR; INTRAVENOUS; SUBCUTANEOUS at 16:10

## 2021-09-26 RX ADMIN — PANCRELIPASE 2 CAPSULE: 24000; 76000; 120000 CAPSULE, DELAYED RELEASE PELLETS ORAL at 17:23

## 2021-09-26 RX ADMIN — MIDODRINE HYDROCHLORIDE 10 MG: 5 TABLET ORAL at 08:38

## 2021-09-26 RX ADMIN — SEVELAMER CARBONATE 1.6 G: 800 POWDER, FOR SUSPENSION ORAL at 21:26

## 2021-09-26 RX ADMIN — Medication 5 ML: at 08:39

## 2021-09-26 RX ADMIN — Medication 5 ML: at 17:23

## 2021-09-26 RX ADMIN — Medication 1 PACKET: at 08:39

## 2021-09-26 RX ADMIN — PANCRELIPASE 2 CAPSULE: 24000; 76000; 120000 CAPSULE, DELAYED RELEASE PELLETS ORAL at 21:25

## 2021-09-26 RX ADMIN — Medication 5 ML: at 06:59

## 2021-09-26 RX ADMIN — OXYCODONE HYDROCHLORIDE 5 MG: 5 SOLUTION ORAL at 12:50

## 2021-09-26 RX ADMIN — SODIUM BICARBONATE 325 MG: 325 TABLET ORAL at 04:32

## 2021-09-26 RX ADMIN — FOLIC ACID 1 MG: 1 TABLET ORAL at 08:39

## 2021-09-26 RX ADMIN — HYDROMORPHONE HYDROCHLORIDE 1 MG: 1 SOLUTION ORAL at 02:59

## 2021-09-26 RX ADMIN — METHYLPHENIDATE HYDROCHLORIDE 5 MG: 5 TABLET ORAL at 08:39

## 2021-09-26 RX ADMIN — MIDODRINE HYDROCHLORIDE 10 MG: 5 TABLET ORAL at 21:26

## 2021-09-26 RX ADMIN — POTASSIUM CHLORIDE 40 MEQ: 1.5 POWDER, FOR SOLUTION ORAL at 08:47

## 2021-09-26 RX ADMIN — ONDANSETRON 4 MG: 2 INJECTION INTRAMUSCULAR; INTRAVENOUS at 13:48

## 2021-09-26 RX ADMIN — AMOXICILLIN 500 MG: 400 POWDER, FOR SUSPENSION ORAL at 13:48

## 2021-09-26 RX ADMIN — SEVELAMER CARBONATE 1.6 G: 800 POWDER, FOR SUSPENSION ORAL at 08:38

## 2021-09-26 RX ADMIN — THIAMINE HCL TAB 100 MG 100 MG: 100 TAB at 08:39

## 2021-09-26 RX ADMIN — PANCRELIPASE 2 CAPSULE: 24000; 76000; 120000 CAPSULE, DELAYED RELEASE PELLETS ORAL at 04:32

## 2021-09-26 RX ADMIN — POTASSIUM CHLORIDE 40 MEQ: 1.5 POWDER, FOR SOLUTION ORAL at 13:48

## 2021-09-26 RX ADMIN — HYDROMORPHONE HYDROCHLORIDE 1 MG: 1 SOLUTION ORAL at 08:39

## 2021-09-26 RX ADMIN — PANCRELIPASE 2 CAPSULE: 24000; 76000; 120000 CAPSULE, DELAYED RELEASE PELLETS ORAL at 00:27

## 2021-09-26 RX ADMIN — HYDROMORPHONE HYDROCHLORIDE 1 MG: 1 SOLUTION ORAL at 23:29

## 2021-09-26 RX ADMIN — HYDROXYZINE HYDROCHLORIDE 50 MG: 25 TABLET, FILM COATED ORAL at 17:22

## 2021-09-26 RX ADMIN — Medication 1 PACKET: at 21:26

## 2021-09-26 RX ADMIN — PROCHLORPERAZINE EDISYLATE 5 MG: 5 INJECTION INTRAMUSCULAR; INTRAVENOUS at 14:37

## 2021-09-26 RX ADMIN — SODIUM BICARBONATE 325 MG: 325 TABLET ORAL at 17:23

## 2021-09-26 RX ADMIN — OXYCODONE HYDROCHLORIDE 5 MG: 5 SOLUTION ORAL at 04:32

## 2021-09-26 RX ADMIN — Medication 6 MG: at 23:29

## 2021-09-26 RX ADMIN — HYDROMORPHONE HYDROCHLORIDE 1 MG: 1 SOLUTION ORAL at 13:48

## 2021-09-26 RX ADMIN — PANCRELIPASE 2 CAPSULE: 24000; 76000; 120000 CAPSULE, DELAYED RELEASE PELLETS ORAL at 08:40

## 2021-09-26 RX ADMIN — ONDANSETRON 4 MG: 2 INJECTION INTRAMUSCULAR; INTRAVENOUS at 20:46

## 2021-09-26 RX ADMIN — ONDANSETRON 4 MG: 2 INJECTION INTRAMUSCULAR; INTRAVENOUS at 08:38

## 2021-09-26 RX ADMIN — ACETAMINOPHEN 325 MG: 325 SOLUTION ORAL at 08:38

## 2021-09-26 RX ADMIN — METHYLPHENIDATE HYDROCHLORIDE 5 MG: 5 TABLET ORAL at 12:51

## 2021-09-26 RX ADMIN — MIDODRINE HYDROCHLORIDE 10 MG: 5 TABLET ORAL at 13:47

## 2021-09-26 RX ADMIN — Medication 1 PACKET: at 13:49

## 2021-09-26 RX ADMIN — ACETAMINOPHEN 325 MG: 325 SOLUTION ORAL at 17:22

## 2021-09-26 RX ADMIN — HYDROMORPHONE HYDROCHLORIDE 1 MG: 1 SOLUTION ORAL at 17:22

## 2021-09-26 RX ADMIN — OXYCODONE HYDROCHLORIDE 5 MG: 5 SOLUTION ORAL at 20:46

## 2021-09-26 ASSESSMENT — MIFFLIN-ST. JEOR: SCORE: 1567.75

## 2021-09-26 ASSESSMENT — ACTIVITIES OF DAILY LIVING (ADL)
ADLS_ACUITY_SCORE: 18

## 2021-09-26 NOTE — PROGRESS NOTES
No changes in status today. Pain and nausea relief with available prn meds. Repositioned twice. Dressing changed to abdominal wound. Site is pink/red, sutures appear to be digging into skin - MD aware and will look at it tomorrow. Tolerating tube feeds at 45ml/hour. Gastric output less than yesterday, today ~ 1.2L out. Potassium replaced, recheck 4.6. Dialysis tomorrow, continue with cares.

## 2021-09-26 NOTE — PROGRESS NOTES
Brief Nephrology Progress Note:    Hypokalemic largely due to GI loss.  Hyponatremic due to water intake in the setting of renal failure.        -From a renal standpoint it is ok to give 20-40 mEq PO as necessary to achieve a serum potassium of 3.5 mEq/L or higher.  I suspect he will need at least 40-80 mEq today especially if GI output remains high  -potassium replacement protocol not ideal in the setting of ESKD.  Having said that, given his ongoing GI losses he may need more potassium supplementation similar to the protocol.  Regardless, serum potassium measurements following supplementation will be the best guide as to appropriate replacement strategies.    -Next HD planned for tomorrow which will further help to normalize his potassium and sodium    Mitch Funes MD   [phone]

## 2021-09-26 NOTE — PLAN OF CARE
".Assumed cares [id number]. Pt rounded on hourly.     ./43 (BP Location: Right arm)   Pulse 99   Temp (!) 96.3  F (35.7  C) (Oral)   Resp 17   Ht 1.676 m (5' 6\")   Wt 67 kg (147 lb 11.3 oz)   SpO2 99%   BMI 23.84 kg/m      Pain: endorses generalized abdominal pain- PRN dilaudid, tylenol and scheduled oxycodone administered with relief.   Neuro: Aox4, able to make needs known.   Resp: WDL on RA while awake- denies SOB. 2 L O2 nasal canula needed overnight.   Cardiac: tachycardic. Soft BP's- MD aware. Denies chest pains.  Skin: sacral mepilex applied for blanchable redness. Abdominal dressing C/D/I.   GI/: intermittent nausea relieved with scheduled Zofran. G tube to LIS. Pt on HD.   Nutrition: NPO. TF @ 45 mL/hr and tolerating well.   Activity: assist x2. Turned q 2 hrs as pt allows. Pt refusing intermittently despite education.   Access: triple lumen PICC    Events: MD paged this AM due to hypotension- lactic ordered (results pending), per MD continue to monitor.     Plan: continue to follow plan of care and notify MD with changes in condition.       "

## 2021-09-26 NOTE — PROVIDER NOTIFICATION
.Provider notified (name): Dr. Jonn Day  Reason for notification: 5b 36 KR #[id number] Linda RN pt BP 88/31, MAP 46, . pt denies symptoms. any new orders at this time? thanks!  Recommendation/request given to provider:  Response from provider: provider called writer back- lactic acid ordered. Continue to monitor.

## 2021-09-26 NOTE — PROGRESS NOTES
[facility]    Progress Note - Daniela 3 Service  Date of Admission:  6/28/2021    Changes today:  - Hyponatremia (120, nephrology following)  - q4h Na checks  - 40 mEq x2 K replacement (okay per nephrology)  - Zofran scheduled, compazine backup  - cont midodrine to 10 mg TID  - Will follow-up with palliative on Monday for recs for 3rd line anti-emetic prn    Assessment & Plan              Dax Villareal is a 30 year old man with hx of alcohol use disorder admitted on 6/28/2021 after recent prolonged admission at [facility] in [place] who has severe acute alcoholic hepatitis c/b HE, ESRD requiring HD, and malnutrition in the setting of acute necrotizing pancreatitis, s/p PEG-J placement on 7/13. He arrived with acute respiratory failure and septic shock that have resolved, and his secondary bacterial peritonitis has been appropriately treated. He requires continued management for ESRD, hepatic encephalopathy, infected pancreatic pseudocyst, nutritional support via PEG-J, and persistent right pleural effusion. Clinically improving after multiple necrosectomies. Increasing leukocytosis and hypotension on 9/2 prompting resumption of IV antibiotics - has been stable since that time. Repeat CT with interval improvement in fluid collections.    Necrotizing alcoholic pancreatitis w/ infected pseudocyst, improving  Hepatic Fluid collection, stable to improving  Secondary Bacterial Peritonitis 2/2 infected pseudocyst with VRE, improving  Septic Shock, resolved  Admitted to the [facility] ICU on 6/28/2021 from [facility] in [place] for septic shock due to necrotizing pancreatitis. CT on admit showing mature collection with enhancing wall measuring ~[id number]. IR placed perc drain 6/29. Repeat imaging 7/18 revealed walled off necrotic collection amenable to endoscopic transluminal drainage with cystgastrostomy stenting with necrosectomy on 7/21/21. Complete necrosectomy on 8/11.  Antibiotics (cefepime and metronidazole) were discontinued on 8/25 after left flank drain removed, re-initiated in the setting of fever on 9/2. Favor abdominal etiology, repeat CT on 9/7 with interval improvement in fluid collections - will complete 7d course of abx per ID with EOT 9/8.  - Will likely need intermittent antibiotics with fluid collections occasionally with bacterial growth moving forward as to be expected in nec panc  - Pain - scheduled oxycodone q8H and IV dilaudid for dressing changes  - Palliative care following for pain management and goals of care given overall illness    G-tube Fistula  Dax had been having increasing G-tube output and was found to have an erythematous indurated area next to his G-tube insertion. This was found to be his G-tube balloon pushing through the skin. He underwent G-tube replacement on  9/13/21. His old G-tube site has since had large volume output requiring frequent wound care dressing changes.  - 9/20/21 - EGD to close fistula completed by GI  - G tube to light intermittent suction or gravity for 1 week to allow healing of fistula site (until 9/27)  - Wound team consulted, follow their wound care recs    Periapical abscess: L 3rd maxillary molar  Dental Caries  - Dentistry consulted, and discussed with oral surgery  - Amoxicillin for 7d course (9/21-9/27)  - Dental extraction when stable to be [place] (while inpatient[place] [facility]    Hypovolemia  Patient with substantial daily NG output and poor PO. Also note significant weight loss over the past few weeks. He has needed intermittent boluses of normal saline for euvolemia. Have discussed with renal multiple times - please re-evaluate volume status vs dry weight as he is often hypotensive following fluid removal.  - Weight 78kg on 8/13. Then 55 kg, weight 68.5kg on 9/22. Concern for weight loss vs. targeting dry weight.  - Monitor vitals and output post dialysis, consider fluids prn.  - Consider  discussing using midodrine with Nephrology    Gastric Outlet Obstruction  High G tube output due to the cystogastric connection/fluid drainage noted on 8/5. Per GI, possibly related to gastric outlet from severe abdominal inflammation and is expected. Continues to have mild to moderate NG output - stable to improved. Relieved with draining G tube to gravity when experiencing pain/nausea.    Hyponatremia, stable  Patient with down-trending sodium the week of 8/27; likely due to substantial NG output and low solute intake and hepatic dysfunction. Stable 127-128.    Right Pancreatic Hydrothorax, stable  Increased dyspnea with CXR on 7/19 with further imaging and thoracentesis confirming a hepatic/pancreatic hydrothorax. Thoracentesis PRN for dyspnea, most recently 9/3.    ESRD due to ATN  Hyperphosphatemia  Stage III RADHA due to ATN from septic shock without recovery for >3 months. Line is tunneled dialysis catheter from [facility]. Nephrology dialyzed on Tu, Th, Sa schedule.  - Will need follow-up and outpatient dialysis  - Sevelemer powder TID with TF for hyperphosphatemia   - Discuss if needs dialysis line replacement      Anemia of chronic illness  Hematochezia, resolved  Hemoperitoneum  Baseline Hgb 17. Running between 6-8 in setting of chronic illness, new ESRD with epocrit per Nephrology, frequent lab draws and procedures. Hematochezia on 8/8 after starting standard intensity heparin, now resolved. Paracentesis on 8/21 with hemoperitoneum. No active bleeding. Required a unit of PRBC on 9/4 in the setting of several fluid boluses.   - Goal hgb > 7.0, trend Hgb     Alcohol Use Disorder  Counseled patient on complete alcohol cessation. Not interested in additional services at this time. Continue daily folic acid and thiamine.  - Ensure patient has resources for treatment at discharge if managing alcohol cessation independently becomes challenging.    Severe malnutrition of chronic illness  Poor oral intake in the setting  of necrotizing pancreatitis. PEG tube placed 7/13, replaced 9/14. Continuous tube feeds with goal of 65 ml/hr with continuous pancreatic enzymes. Transitioning to renal formula.   - Continuous tube feeds     Diet: NPO with tube feeds  DVT Prophylaxis: SCDs  Rdz Catheter: Not present  Central Lines: PRESENT       Code Status: Full Code      Disposition Plan   Expected discharge: 7-8 days  recommended to long term care once adequate pain management/ tolerating PO medications and safe disposition plan/ TCU bed available.     The patient's care was discussed with the Attending Physician, Dr. Ibanez.    Marleny Monk MD  PGY-1, Internal Medicine-Pediatrics  ______________________________________________________________________    Interval History   Patient reports he feels unchanged from previous. He notes he has no new cough and no worsening of his abdominal pain.    Data reviewed today: I reviewed all medications, new labs and imaging results over the last 24 hours. I personally reviewed no images or EKG's today.    Physical Exam   Vital Signs: Temp: (!) 96.3  F (35.7  C) Temp src: Oral BP: 102/43 Pulse: 99   Resp: 17 SpO2: 99 % O2 Device: None (Room air) Oxygen Delivery: 2 LPM  Weight: 147 lbs 11.33 oz  General Appearance: Chronically ill appearing, in no acute distress  Respiratory: normal work of breathing on ambient air  Cardiovascular: regular rate  GI: soft ND, tender to palpation around Gtube site  Skin: erythema around procedures site  Other: Alert, conversant, fatigued

## 2021-09-26 NOTE — PROGRESS NOTES
Brief Nephrology Progress Note:    Hypokalemic largely due to GI loss.      -From a renal standpoint it is ok to give 20 mEq PO/IV as necessary to achieve a serum potassium of 3.5 mEq/L or higher.  I suspect he will need at least 40-80 mEq today especially if GI output remains high  -potassium replacement protocol not ideal in the setting of ESKD.  Having said that, given his ongoing GI losses he may need more potassium supplementation similar to the protocol.  Regardless, serum potassium measurements following supplementation will be the best guide as to appropriate replacement strategies.    -Next HD planned for Monday    Mitch Funes MD   [phone]

## 2021-09-27 LAB
ANION GAP SERPL CALCULATED.3IONS-SCNC: 10 MMOL/L (ref 3–14)
ANION GAP SERPL CALCULATED.3IONS-SCNC: 17 MMOL/L (ref 3–14)
BASE EXCESS BLDV CALC-SCNC: -7 MMOL/L (ref -7.7–1.9)
BUN SERPL-MCNC: 22 MG/DL (ref 7–30)
BUN SERPL-MCNC: 73 MG/DL (ref 7–30)
CALCIUM SERPL-MCNC: 10.3 MG/DL (ref 8.5–10.1)
CALCIUM SERPL-MCNC: 8.8 MG/DL (ref 8.5–10.1)
CHLORIDE BLD-SCNC: 86 MMOL/L (ref 94–109)
CHLORIDE BLD-SCNC: 94 MMOL/L (ref 94–109)
CO2 SERPL-SCNC: 18 MMOL/L (ref 20–32)
CO2 SERPL-SCNC: 26 MMOL/L (ref 20–32)
CREAT SERPL-MCNC: 2.04 MG/DL (ref 0.66–1.25)
CREAT SERPL-MCNC: 4.97 MG/DL (ref 0.66–1.25)
GFR SERPL CREATININE-BSD FRML MDRD: 14 ML/MIN/1.73M2
GFR SERPL CREATININE-BSD FRML MDRD: 42 ML/MIN/1.73M2
GLUCOSE BLD-MCNC: 126 MG/DL (ref 70–99)
GLUCOSE BLD-MCNC: 95 MG/DL (ref 70–99)
GLUCOSE BLDC GLUCOMTR-MCNC: 101 MG/DL (ref 70–99)
HCO3 BLDV-SCNC: 19 MMOL/L (ref 21–28)
MAGNESIUM SERPL-MCNC: 2.4 MG/DL (ref 1.6–2.3)
O2/TOTAL GAS SETTING VFR VENT: 3 %
PCO2 BLDV: 38 MM HG (ref 40–50)
PH BLDV: 7.3 [PH] (ref 7.32–7.43)
PO2 BLDV: 45 MM HG (ref 25–47)
POTASSIUM BLD-SCNC: 3.3 MMOL/L (ref 3.4–5.3)
POTASSIUM BLD-SCNC: 4.1 MMOL/L (ref 3.4–5.3)
SODIUM SERPL-SCNC: 120 MMOL/L (ref 133–144)
SODIUM SERPL-SCNC: 121 MMOL/L (ref 133–144)
SODIUM SERPL-SCNC: 130 MMOL/L (ref 133–144)
SODIUM SERPL-SCNC: 130 MMOL/L (ref 133–144)

## 2021-09-27 PROCEDURE — 250N000013 HC RX MED GY IP 250 OP 250 PS 637: Performed by: INTERNAL MEDICINE

## 2021-09-27 PROCEDURE — 82803 BLOOD GASES ANY COMBINATION: CPT

## 2021-09-27 PROCEDURE — 250N000011 HC RX IP 250 OP 636

## 2021-09-27 PROCEDURE — 99233 SBSQ HOSP IP/OBS HIGH 50: CPT | Performed by: INTERNAL MEDICINE

## 2021-09-27 PROCEDURE — 250N000013 HC RX MED GY IP 250 OP 250 PS 637: Performed by: HOSPITALIST

## 2021-09-27 PROCEDURE — 250N000011 HC RX IP 250 OP 636: Performed by: HOSPITALIST

## 2021-09-27 PROCEDURE — 99232 SBSQ HOSP IP/OBS MODERATE 35: CPT | Performed by: PHYSICIAN ASSISTANT

## 2021-09-27 PROCEDURE — 83735 ASSAY OF MAGNESIUM: CPT | Performed by: INTERNAL MEDICINE

## 2021-09-27 PROCEDURE — 36592 COLLECT BLOOD FROM PICC: CPT

## 2021-09-27 PROCEDURE — 250N000013 HC RX MED GY IP 250 OP 250 PS 637

## 2021-09-27 PROCEDURE — 250N000011 HC RX IP 250 OP 636: Performed by: INTERNAL MEDICINE

## 2021-09-27 PROCEDURE — 250N000013 HC RX MED GY IP 250 OP 250 PS 637: Performed by: STUDENT IN AN ORGANIZED HEALTH CARE EDUCATION/TRAINING PROGRAM

## 2021-09-27 PROCEDURE — 634N000001 HC RX 634: Performed by: CLINICAL NURSE SPECIALIST

## 2021-09-27 PROCEDURE — 82310 ASSAY OF CALCIUM: CPT

## 2021-09-27 PROCEDURE — 258N000003 HC RX IP 258 OP 636: Performed by: CLINICAL NURSE SPECIALIST

## 2021-09-27 PROCEDURE — 90937 HEMODIALYSIS REPEATED EVAL: CPT

## 2021-09-27 PROCEDURE — 36592 COLLECT BLOOD FROM PICC: CPT | Performed by: INTERNAL MEDICINE

## 2021-09-27 PROCEDURE — 120N000002 HC R&B MED SURG/OB UMMC

## 2021-09-27 PROCEDURE — 250N000011 HC RX IP 250 OP 636: Performed by: STUDENT IN AN ORGANIZED HEALTH CARE EDUCATION/TRAINING PROGRAM

## 2021-09-27 PROCEDURE — 84295 ASSAY OF SERUM SODIUM: CPT

## 2021-09-27 PROCEDURE — 258N000003 HC RX IP 258 OP 636

## 2021-09-27 PROCEDURE — 82374 ASSAY BLOOD CARBON DIOXIDE: CPT | Performed by: INTERNAL MEDICINE

## 2021-09-27 PROCEDURE — C9113 INJ PANTOPRAZOLE SODIUM, VIA: HCPCS | Performed by: INTERNAL MEDICINE

## 2021-09-27 PROCEDURE — 99233 SBSQ HOSP IP/OBS HIGH 50: CPT | Mod: GC | Performed by: STUDENT IN AN ORGANIZED HEALTH CARE EDUCATION/TRAINING PROGRAM

## 2021-09-27 RX ADMIN — SODIUM BICARBONATE 325 MG: 325 TABLET ORAL at 05:55

## 2021-09-27 RX ADMIN — PANCRELIPASE 2 CAPSULE: 24000; 76000; 120000 CAPSULE, DELAYED RELEASE PELLETS ORAL at 05:55

## 2021-09-27 RX ADMIN — HYDROXYZINE HYDROCHLORIDE 50 MG: 25 TABLET, FILM COATED ORAL at 20:28

## 2021-09-27 RX ADMIN — Medication 6 MG: at 23:59

## 2021-09-27 RX ADMIN — OXYCODONE HYDROCHLORIDE 5 MG: 5 SOLUTION ORAL at 12:51

## 2021-09-27 RX ADMIN — PANTOPRAZOLE SODIUM 40 MG: 40 INJECTION, POWDER, FOR SOLUTION INTRAVENOUS at 08:48

## 2021-09-27 RX ADMIN — ONDANSETRON 4 MG: 2 INJECTION INTRAMUSCULAR; INTRAVENOUS at 18:03

## 2021-09-27 RX ADMIN — Medication 15 ML: at 18:12

## 2021-09-27 RX ADMIN — FOLIC ACID 1 MG: 1 TABLET ORAL at 10:49

## 2021-09-27 RX ADMIN — HYDROMORPHONE HYDROCHLORIDE 1 MG: 1 SOLUTION ORAL at 09:05

## 2021-09-27 RX ADMIN — SODIUM BICARBONATE 325 MG: 325 TABLET ORAL at 18:13

## 2021-09-27 RX ADMIN — Medication 1 PACKET: at 10:48

## 2021-09-27 RX ADMIN — ONDANSETRON 4 MG: 2 INJECTION INTRAMUSCULAR; INTRAVENOUS at 20:24

## 2021-09-27 RX ADMIN — SODIUM BICARBONATE 325 MG: 325 TABLET ORAL at 01:40

## 2021-09-27 RX ADMIN — PANCRELIPASE 2 CAPSULE: 24000; 76000; 120000 CAPSULE, DELAYED RELEASE PELLETS ORAL at 10:46

## 2021-09-27 RX ADMIN — HYDROMORPHONE HYDROCHLORIDE 0.5 MG: 1 INJECTION, SOLUTION INTRAMUSCULAR; INTRAVENOUS; SUBCUTANEOUS at 20:22

## 2021-09-27 RX ADMIN — SEVELAMER CARBONATE 1.6 G: 800 POWDER, FOR SUSPENSION ORAL at 20:28

## 2021-09-27 RX ADMIN — CYCLOBENZAPRINE 10 MG: 10 TABLET, FILM COATED ORAL at 02:29

## 2021-09-27 RX ADMIN — SEVELAMER CARBONATE 1.6 G: 800 POWDER, FOR SUSPENSION ORAL at 10:47

## 2021-09-27 RX ADMIN — Medication 5 ML: at 10:48

## 2021-09-27 RX ADMIN — PANCRELIPASE 2 CAPSULE: 24000; 76000; 120000 CAPSULE, DELAYED RELEASE PELLETS ORAL at 20:28

## 2021-09-27 RX ADMIN — SODIUM BICARBONATE 325 MG: 325 TABLET ORAL at 20:28

## 2021-09-27 RX ADMIN — SODIUM BICARBONATE 325 MG: 325 TABLET ORAL at 10:46

## 2021-09-27 RX ADMIN — THIAMINE HYDROCHLORIDE 100 MG: 100 INJECTION, SOLUTION INTRAMUSCULAR; INTRAVENOUS at 08:47

## 2021-09-27 RX ADMIN — Medication: at 13:44

## 2021-09-27 RX ADMIN — AMOXICILLIN 500 MG: 400 POWDER, FOR SUSPENSION ORAL at 18:12

## 2021-09-27 RX ADMIN — HYDROMORPHONE HYDROCHLORIDE 1 MG: 1 SOLUTION ORAL at 04:03

## 2021-09-27 RX ADMIN — METHYLPHENIDATE HYDROCHLORIDE 5 MG: 5 TABLET ORAL at 10:47

## 2021-09-27 RX ADMIN — HYDROMORPHONE HYDROCHLORIDE 1 MG: 1 SOLUTION ORAL at 23:59

## 2021-09-27 RX ADMIN — ONDANSETRON 4 MG: 2 INJECTION INTRAMUSCULAR; INTRAVENOUS at 08:47

## 2021-09-27 RX ADMIN — PROCHLORPERAZINE EDISYLATE 5 MG: 5 INJECTION INTRAMUSCULAR; INTRAVENOUS at 10:47

## 2021-09-27 RX ADMIN — METHYLPHENIDATE HYDROCHLORIDE 5 MG: 5 TABLET ORAL at 18:11

## 2021-09-27 RX ADMIN — Medication 1 PACKET: at 18:13

## 2021-09-27 RX ADMIN — PANCRELIPASE 2 CAPSULE: 24000; 76000; 120000 CAPSULE, DELAYED RELEASE PELLETS ORAL at 18:12

## 2021-09-27 RX ADMIN — DEXTROSE MONOHYDRATE 200 ML: 50 INJECTION, SOLUTION INTRAVENOUS at 20:56

## 2021-09-27 RX ADMIN — Medication 1 PACKET: at 10:49

## 2021-09-27 RX ADMIN — MIDODRINE HYDROCHLORIDE 10 MG: 5 TABLET ORAL at 08:48

## 2021-09-27 RX ADMIN — PANCRELIPASE 2 CAPSULE: 24000; 76000; 120000 CAPSULE, DELAYED RELEASE PELLETS ORAL at 01:40

## 2021-09-27 RX ADMIN — HYDROMORPHONE HYDROCHLORIDE 1 MG: 1 SOLUTION ORAL at 18:03

## 2021-09-27 RX ADMIN — HYDROXYZINE HYDROCHLORIDE 50 MG: 25 TABLET, FILM COATED ORAL at 10:47

## 2021-09-27 RX ADMIN — SEVELAMER CARBONATE 1.6 G: 800 POWDER, FOR SUSPENSION ORAL at 18:11

## 2021-09-27 RX ADMIN — Medication 1 PACKET: at 20:29

## 2021-09-27 RX ADMIN — SODIUM CHLORIDE 300 ML: 9 INJECTION, SOLUTION INTRAVENOUS at 13:43

## 2021-09-27 RX ADMIN — SODIUM CHLORIDE, POTASSIUM CHLORIDE, SODIUM LACTATE AND CALCIUM CHLORIDE 500 ML: 600; 310; 30; 20 INJECTION, SOLUTION INTRAVENOUS at 04:03

## 2021-09-27 RX ADMIN — Medication 5 ML: at 19:05

## 2021-09-27 RX ADMIN — HYDROMORPHONE HYDROCHLORIDE 0.5 MG: 1 INJECTION, SOLUTION INTRAMUSCULAR; INTRAVENOUS; SUBCUTANEOUS at 15:14

## 2021-09-27 RX ADMIN — HYDROXYZINE HYDROCHLORIDE 50 MG: 25 TABLET, FILM COATED ORAL at 02:29

## 2021-09-27 RX ADMIN — OXYCODONE HYDROCHLORIDE 5 MG: 5 SOLUTION ORAL at 05:55

## 2021-09-27 RX ADMIN — MIDODRINE HYDROCHLORIDE 10 MG: 5 TABLET ORAL at 18:11

## 2021-09-27 RX ADMIN — SODIUM CHLORIDE 250 ML: 9 INJECTION, SOLUTION INTRAVENOUS at 13:43

## 2021-09-27 RX ADMIN — EPOETIN ALFA-EPBX 5000 UNITS: 10000 INJECTION, SOLUTION INTRAVENOUS; SUBCUTANEOUS at 13:42

## 2021-09-27 RX ADMIN — OXYCODONE HYDROCHLORIDE 5 MG: 5 SOLUTION ORAL at 20:28

## 2021-09-27 ASSESSMENT — ACTIVITIES OF DAILY LIVING (ADL)
ADLS_ACUITY_SCORE: 18

## 2021-09-27 ASSESSMENT — MIFFLIN-ST. JEOR
SCORE: 1532.75
SCORE: 1532.75

## 2021-09-27 NOTE — PROVIDER NOTIFICATION
.Provider notified (name): Dr. Jonn Day  Reason for notification: 5b 36 KR #[id number] Linda RN BP 86/39. MAP 53. . pt states some dizziness. any new orders at this time? thanks!  Recommendation/request given to provider:   Response from provider: provider assessed pt at bedside and spoke to RN. No new orders at this time. Recheck BP for the time being.     *addendum*  Ordered 500 mL LR bolus

## 2021-09-27 NOTE — PROGRESS NOTES
[facility]  Palliative Care Daily Progress Note       Recommendations & Counseling       Nausea: multifactorial. Pt states nausea controlled with current meds (zofran 4mg TID, protonix every day, compazine PRN, atarax PRN). Would continue those for now. Note QTc 491      Pain: pt states pain managed with current medications - getting 5mg oxycocone TID scheduled and hydromorphone PRN. For now would consider discontinuing hydromorphone IV which he is using approx once a day and will continue to follow PRN use for now. As pain is better controlled, will consider decreasing PRN available. No changes now.  If pain persists closer to discharge, could consider low dose buprenorphine and he will need follow up in pain / palliative clinic.        Thank you for the opportunity to continue to participate in the care of this patient and family.  Please feel free to contact on-call palliative provider with any emergent needs.  We can be reached via team pager [phone] (answered 8-4:30 Monday-Friday); after-hours answering service ([phone]);     Valentina Varela MD / Palliative Medicine / Pager [phone]     Total time spent was 35 minutes spent in reviewing record, patient examination and counseling, discussion with primary team and documentation. >50% of time was spent counseling and/or coordination of care regarding symptom management, plan of care.     Assessments          Dax Villareal is a 30 year old man with history of alcohol dependence with a prolonged and complex hospitalization for acute alcoholic hepatitis and associated liver failure that has been complicated by RADHA requiring HD, hepatic encephalopathy, SBP causing septic shock, infected necrotizing pancreatitis requiring drainage and multiple necrosectomies, malnutrition requiring PEG-J leading to fistula s/p endoscopic suture gastroplexy, with continue difficulty with pain management and palliative  medicine re-consulted to assist with pain management, nausea and goals of care.     Today, the patient was seen for:  acute alcoholic hepatitis and associated liver failure  RADHA requiring HD  Malnutrition  Complex pain   Nausea     Prognosis, Goals, or Advance Care Planning was addressed today with: No.     Mood, coping, and/or meaning in the context of serious illness were addressed today: No.              Interval History:     Chart review/discussion with unit or clinical team members:   Continues on tube feeds  Dialysis as per nephrology  Pain and nausea controlled with PRN meds    Per patient or family/caregivers today:  Today Dax states his nausea and pain are well managed. He states that moving around causes him to develop nausea but that the nausea goes away with medication and at times resolves on it's own. Pain in abdomin and back in general but currently without pain .     Key Palliative Symptoms:   Pain location: abdomen and back  # Pain severity the last 12 hours: low  # Dyspnea severity the last 12 hours: none  # Nausea severity the last 12 hours: low    Patient is on opioids: assessed and bowels ok/no needed changes to plan of care today.           Review of Systems:     Besides above, a complete 10+ ROS was reviewed and is unremarkable           Medications:     I have reviewed this patient's medication profile and medications during this hospitalization.    Noted meds:    zofran 4mg TID   protonix 40mg IV every day  Atarax 25-50mg TID PRN   Compazine 5mg IV q6hrs PRN     Oxycodone 5mg q8hrs scheduled   Tylenol PRN   Flexeril PRN   Hydromorphone 1mg PO q4hrs PRN - 5mg x 24 hrs  Hydromorphone 0.5mg IV q6hrs PRN - 1 dose every day past 4 days.            Physical Exam:   Vitals were reviewed  Temp: (!) 96.1  F (35.6  C) Temp src: Oral BP: (!) 93/35 Pulse: 110   Resp: 20 SpO2: 95 % O2 Device: Nasal cannula Oxygen Delivery: 2 LPM  Gen: tired, appears chronically ill, comfortable, in NAD  Eyes:  Conjunctiva clear. Sclera anicteric   HENT: NCAT; mucous membranes slightly dry  Resp: no increased work of breathing, speaking full sentences  Abd: soft, slightly distended, +BS  Msk: no gross deformity,   Skin:  trace jaundice  Neuro: A&O x 3; CN II-XII grossly intact;   Mental status/Psych: tired, able to engage in conversation; sensorium intact             Data Reviewed:     Reviewed recent labs and pertinent imaging  QTc 491

## 2021-09-27 NOTE — PROGRESS NOTES
"SPIRITUAL HEALTH SERVICES  SPIRITUAL ASSESSMENT Progress Note  [facility] Dialysis    Referral Source: Follow-up visit ( initiated per length of stay)    Pt. Stated that he was \"doing ok\". He expressed \"excitement\" about \"being able to eat dinner\". He is looking forward specifically to \"mountain dew and mandarin oranges\". Pt. Identifies as Nondenominational and noted that he connects to god through prayer.     Plan: Spiritual health services remains available for any follow-up or requests.     [facility] remains available 24/7 for emergent requests/referrals, either by having the switchboard page the on-call  or by entering an ASAP/STAT consult in Epic (this will also page the on-call ).    __    Rabbi Rommel Aguilar  Chaplain Resident  Pager [phone]    "

## 2021-09-27 NOTE — CONSULTS
Health Psychology                                                                                                                          Margarita Loya, Ph.D., L.P [phone]  Mae Taylor, Ph.D., L.P. [phone]  Jennifer Reyez, Ph.D. [phone]  Kathy Ahumada, Ph.D., L.P. [phone]  Kirt Carrasco, Ph.D., A.B.P.P., L.P. [phone]         Lauren Tellez, Ph.D., L.P. [phone]                [facility], 3rd Floor  [address] [place] [address]      Inpatient Health Psychology Consultation      Date of Service:  9/27/2021      Per EMR: Dax Villareal is a 30 year old man with hx of alcohol use disorder admitted on 6/28/2021 after recent prolonged admission at [facility] in [place] who has severe acute alcoholic hepatitis c/b HE, ESRD requiring HD, and malnutrition in the setting of acute necrotizing pancreatitis, s/p PEG-J placement on 7/13. He arrived with acute respiratory failure and septic shock that have resolved, and his secondary bacterial peritonitis has been appropriately treated. He requires continued management for ESRD, hepatic encephalopathy, infected pancreatic pseudocyst, nutritional support via PEG-J, and persistent right pleural effusion. Clinically improving after multiple necrosectomies. Increasing leukocytosis and hypotension on 9/2 prompting resumption of IV antibiotics - has been stable since that time. Repeat CT with interval improvement in fluid collections.    Health Psychology consulted to support patient in coping with prolonged admission and decreased participation in cares and personal hygiene.     Reviewed chart. Psychiatry met with patient on 9/21/21.  No significant mental health history.  Reported feeling more positive on that visit.     Patient in dialysis when this writer was available.  Met with patient at the end of the day to make plan for a visit tomorrow.  Dax agreed to talk tomorrow and said he can't  talk too long because he gets tired easily.  Informed him of nature of consult and general goals we will address.     Plan: Eval + therapy visit tomorrow, 9/28.

## 2021-09-27 NOTE — PROGRESS NOTES
HEMODIALYSIS TREATMENT NOTE    Date: 9/27/2021  Time: 4:37 PM    Data:  Pre Wt: 68 kg (149 lb 14.6 oz)   Desired Wt: 63.5 kg   Post Wt: 68.3 kg (150 lb 11 oz) (estimate)  Weight change: -0.35 kg  Ultrafiltration - Post Run Net Total Removed (mL): 0 mL  Vascular Access Status: patent  Dialyzer Rinse: Clear  Total Blood Volume Processed: 67.36 L Liters  Total Dialysis (Treatment) Time: 8252-0744 Hours    Lab:   No    Interventions:      Assessment:  Patient on hemodialysis from 9288-4475. Patient ran on K3 and Ca2.25 baths. No fluid removed as ordered. A/O with stable vitals. Max . Right Permcath patent and saline locked. Report given to ROB Rodriguez.     Plan:    Per Renal

## 2021-09-27 NOTE — PLAN OF CARE
Patient continues to c/o generalized pain along with nausea. Some relief with PRN pain medication. Soft/loose BM x1 in bedpan. Continues to refuse repositioning side to side in bed but allowed staff to boost up in bed multiple times throughout the day and put pillows under him. Patient would like to eat.Tolerating TF at goal rate of 45 ml/hr. G-tube to LIS with large output. Bottom pink, no open area noted. Mepilex taken off per patient request. Sleeping in-between cares. Left for dialysis around 1300. Will continue with plan of care.

## 2021-09-27 NOTE — PROGRESS NOTES
"    Gastroenterology Advanced Endoscopy Progress Note 9/27    30 year old male with a history of alcohol use disorder who presented to OSH 5/19 for abdominal pain, nausea and vomiting, and found to have alcohol hepatitis, ESLD on HD, septic shock requiring pressors as well as necrotizing pancreatitis with large evolving necrotic collection. Transferred to [facility] for drainage of presumed infected necrotic collection. S/p EUS transluminal and percutaneous drainage. GI re-consulted management of feeding tube (PEG/PEGJ).     Interim since last evaluated:   Has been NPO for 1 week. Minimal output from previous G tube site after endoscopic suture. Dressing changed 1-2x daily just for wound cleaning. Would like us to return tomorrow to remove T tags rather than do it today.    BP (!) 93/37   Pulse 104   Temp 97.5  F (36.4  C) (Oral)   Resp 20   Ht 1.676 m (5' 6\")   Wt 63.5 kg (139 lb 15.9 oz)   SpO2 98%   BMI 22.60 kg/m    Gen: patient seen in dialysis, laying comfortably in bed  Abd: old G tube site covered with dry bandage, G tube with tube feeds infusing through J port, three T-tags present with dried blood, abd o/w soft, NT, ND     # Poor nutrition s/p PEG-J placement  # Severe malnutrition in the context of chronic illness  GI renotified 9/13 for bulging G-tube. Found to be displaced G-tube with balloon approaching the skin causing pain. Last week has tried pushing in, but failed. Thus, underwent G-tube removal and putting in new PEGJ tube at the new site 9/13/21. Patient has large fistula with draining from the old G-tube site and underwent endoscopic closure with APC and suture gastropexy (9/20/21). No leakage post endo-suture now for one week. OK to advance diet and notify GI team with any changes.     # Necrotizing pancreatitis, related to alcohol use  Supportive care per primary team. No GI intervention required at this time for pancreatitis.     Recommendations:  - OK to advance diet as tolerated  - G tube " to gravity prn n/v  - Continue TF via jejunostomy tube   - Monitor fistula site, apply daily dressing and notify service with any recurrent output.   - Plan to remove T tag buttons tomorrow morning per patient request  - Continue wound care as per WOCN    GI will follow peripherally, please call with questions or status changes    Current GI Consult Staff: Dr. Quigley    Follow up recommendations:   - TBD based on discharge plan    Brianna Norwood PA-C  Advanced Endoscopy/Pancreaticobiliary GI Service  [facility]  Pager *[phone]  Text Page

## 2021-09-27 NOTE — PLAN OF CARE
".Assumed cares [id number]. Pt rounded on hourly.     .BP 91/41 (BP Location: Right arm)   Pulse 98   Temp (!) 96.7  F (35.9  C) (Oral)   Resp 20   Ht 1.676 m (5' 6\")   Wt 67 kg (147 lb 11.3 oz)   SpO2 94%   BMI 23.84 kg/m      Pain: endorses generalized abdominal pain- relieved with scheduled and PRN pain medications.   Neuro: Aox4, able to make needs known.  Resp: WDL on RA- denies SOB  Cardiac: soft BP's- MD's aware. Tachycardic. Denies chest pains.   Skin: sacral mepilex in place and C/D/I. Abdominal incision C/D/I.  GI/: intermittent nausea- relieved with scheduled Zofran. Pt on HD. G tube to LIS.  Nutrition: NPO ex ice chips. TF @ 45 mL/hr and tolerating well.   Activity: assist x2/lift. Refuses repositioning despite education.  Access: triple lumen PICC.    Events: MD paged due to hypotension- 500 mL LR bolus ordered and administered with some improvement.     Plan: dialysis 09/27- time TBD. Continue to monitor BP. Continue to follow plan of care and notify MD with changes in condition.         "

## 2021-09-27 NOTE — PROGRESS NOTES
[facility]    Progress Note - Daniela 3 Service  Date of Admission:  6/28/2021    Changes today:  - Hyponatremia (121, nephrology following), dialysis today  - G-tube to gravity (clamp while eating)  - Advanced diet as tolerated  - Will discuss sodium plan with nephrology following dialysis    Assessment & Plan              Dax Villareal is a 30 year old man with hx of alcohol use disorder admitted on 6/28/2021 after recent prolonged admission at [facility] in [place] who has severe acute alcoholic hepatitis c/b HE, ESRD requiring HD, and malnutrition in the setting of acute necrotizing pancreatitis, s/p PEG-J placement on 7/13. He arrived with acute respiratory failure and septic shock that have resolved, and his secondary bacterial peritonitis has been appropriately treated. He requires continued management for ESRD, hepatic encephalopathy, infected pancreatic pseudocyst, nutritional support via PEG-J, and persistent right pleural effusion. Clinically improving after multiple necrosectomies. Increasing leukocytosis and hypotension on 9/2 prompting resumption of IV antibiotics - has been stable since that time. Repeat CT with interval improvement in fluid collections.    Necrotizing alcoholic pancreatitis w/ infected pseudocyst, improving  Hepatic Fluid collection, stable to improving  Secondary Bacterial Peritonitis 2/2 infected pseudocyst with VRE, improving  Septic Shock, resolved  Admitted to the [facility] ICU on 6/28/2021 from [facility] for septic shock due to necrotizing pancreatitis. CT on admit showing mature collection with enhancing wall measuring ~[id number]. IR placed perc drain 6/29. Repeat imaging 7/18 revealed walled off necrotic collection amenable to endoscopic transluminal drainage with cystgastrostomy stenting with necrosectomy on 7/21/21. Complete necrosectomy on 8/11. Antibiotics (cefepime and metronidazole) were discontinued on 8/25  after left flank drain removed, re-initiated in the setting of fever on 9/2. Favor abdominal etiology, repeat CT on 9/7 with interval improvement in fluid collections - will complete 7d course of abx per ID with EOT 9/8.  - Will likely need intermittent antibiotics with fluid collections occasionally with bacterial growth moving forward as to be expected in nec panc  - Pain - scheduled oxycodone q8H and IV dilaudid for dressing changes  - Palliative care following for pain management and goals of care given overall illness    G-tube Fistula  Dax had been having increasing G-tube output and was found to have an erythematous indurated area next to his G-tube insertion. This was found to be his G-tube balloon pushing through the skin. He underwent G-tube replacement on  9/13/21. His old G-tube site has since had large volume output requiring frequent wound care dressing changes.  - 9/20/21 - EGD to close fistula completed by GI  - Wound team consulted, follow their wound care recs  - Advanced diet 9/27, g-tube to gravity (clamped while eating)    Periapical abscess: L 3rd maxillary molar  Dental Caries  - Dentistry consulted, and discussed with oral surgery  - Amoxicillin for 7d course (9/21-9/27)  - Dental extraction when stable to be transported (while inpatient) to [facility] dental [facility]    Hypovolemia  Patient with substantial daily NG output and poor PO. Also note significant weight loss over the past few weeks. He has needed intermittent boluses of normal saline for euvolemia. Have discussed with renal multiple times - please re-evaluate volume status vs dry weight as he is often hypotensive following fluid removal.  - Weight 78kg on 8/13. Then 55 kg, weight 68.5kg on 9/22. Concern for weight loss vs. targeting dry weight.  - Monitor vitals and output post dialysis, consider fluids prn.  - Consider discussing using midodrine with Nephrology    Gastric Outlet Obstruction  High G tube output due to the  cystogastric connection/fluid drainage noted on 8/5. Per GI, possibly related to gastric outlet from severe abdominal inflammation and is expected. Continues to have mild to moderate NG output - stable to improved. Relieved with draining G tube to gravity when experiencing pain/nausea.    Hyponatremia, stable  Patient with down-trending sodium the week of 8/27; likely due to substantial NG output and low solute intake and hepatic dysfunction. Stable 127-128. Then week of 9/27 hyponatremia (120), nephrology following.    Right Pancreatic Hydrothorax, stable  Increased dyspnea with CXR on 7/19 with further imaging and thoracentesis confirming a hepatic/pancreatic hydrothorax. Thoracentesis PRN for dyspnea, most recently 9/3.    ESRD due to ATN  Hyperphosphatemia  Stage III RADHA due to ATN from septic shock without recovery for >3 months. Line is tunneled dialysis catheter from [facility]. Nephrology dialyzed on Tu, Th, Sa schedule.  - Will need follow-up and outpatient dialysis  - Sevelemer powder TID with TF for hyperphosphatemia  - Discuss if needs dialysis line replacement     Anemia of chronic illness  Hematochezia, resolved  Hemoperitoneum  Baseline Hgb 17. Running between 6-8 in setting of chronic illness, new ESRD with epocrit per Nephrology, frequent lab draws and procedures. Hematochezia on 8/8 after starting standard intensity heparin, now resolved. Paracentesis on 8/21 with hemoperitoneum. No active bleeding. Required a unit of PRBC on 9/4 in the setting of several fluid boluses.   - Goal hgb > 7.0, trend Hgb     Alcohol Use Disorder  Counseled patient on complete alcohol cessation. Not interested in additional services at this time. Continue daily folic acid and thiamine.  - Ensure patient has resources for treatment at discharge if managing alcohol cessation independently becomes challenging.    Severe malnutrition of chronic illness  Poor oral intake in the setting of necrotizing pancreatitis. PEG tube placed  7/13, replaced 9/14. Continuous tube feeds with goal of 65 ml/hr with continuous pancreatic enzymes. Transitioning to renal formula.   - Continuous tube feeds     Diet: NPO with tube feeds  DVT Prophylaxis: SCDs  Rdz Catheter: Not present  Central Lines: PRESENT  PICC Triple Lumen 06/28/21 Left-Site Assessment: WDL  CVC Double Lumen Right-Site Assessment: WDL    Code Status: Full Code      Disposition Plan   Expected discharge: 7-8 days  recommended to long term care once adequate pain management/ tolerating PO medications and safe disposition plan/ TCU bed available.     The patient's care was discussed with the Attending Physician, Dr. Ibanez.    Marleny Monk MD  PGY-1, Internal Medicine-Pediatrics  ______________________________________________________________________    Interval History   Patient reports he feels unchanged from previous. He notes he is fatigued and continues to have abdominal pain and nausea. Notes no acute worsening of these symptoms.    Data reviewed today: I reviewed all medications, new labs and imaging results over the last 24 hours. I personally reviewed no images or EKG's today.    Physical Exam   Vital Signs: Temp: 97.5  F (36.4  C) Temp src: Oral BP: 96/57 Pulse: 105   Resp: 13 SpO2: 94 % O2 Device: Nasal cannula Oxygen Delivery: 4 LPM  Weight: 139 lbs 15.87 oz  General Appearance: Chronically ill appearing, in no acute distress  Respiratory: normal work of breathing on ambient air  Cardiovascular: regular rate  GI: soft ND, tender to palpation around Gtube site  Skin: erythema around procedures site  Other: Alert, conversant, fatigued

## 2021-09-27 NOTE — PROGRESS NOTES
Nephrology Progress Note  09/27/2021     Dax Villareal is a 30 year old male with h/o ETOH hepatitis, end stage liver disease, RADHA on HD, transferred from [facility] in [place] for septic shock on 6/28/21 for treatment of necrotizing pancreatitis and decompensated liver disease. He was initially admitted to [facility] 5/19/21.       Assessment & Recommendations:     1 ESRD- Patient has developed ESRD given his unrecovered RADHA.    - Has Right TDC   - Running today and will continue MWF schedule   - CRRT/HD consent obtained from sister Noni per phone     2. Volume status - No dyspnea/edema. On supplemental oxygen.  Pre run weight 63.5 kg. Admission weight 80.2 kg. SBP 80-90/ Intake 1125 ml/Output: UO Drain 900 ml. G 2250. Received 500 ml IVF bolus during the night for hypotension. He is anuric   - UF goal today is no weight off     3. CV - SBP 80-90/ due to large G output. On Midodrine 10 mg TID   - No weight off on HD today     4. ETOH hepatitis/ recurrent ascites, HE - Patient with heavy ETOH use. Last ETOH unknown. TB improved from 20's to 1.4. Off Lactulose/Rifax. Remains on  thiamine, Folate.   - Per GI     5. Electrolytes - Chronic hyponatremia with pre run Na 121, K 4.1.     - He should be on a 1.5 liter/day fluid restriction given that he is anuric on HD ( I will order)   - He also receives quite a bit of FWF     6.  Acid base - Mild metabolic acidosis. Bicarb 18.     Will improve with HD     7. BMD - Ca 10.2, Phos 6.2 ( improved from 10.5), albumin 2.9 ( 99/4)    Continue Renvela 1.6 g TID for phos binder    8. Antimicrobials - Amoxicillin for intra dental infection.     9. Anemia - Hgb 9.3   - Iron studies 8/21: Ferritin 2186, Fe 28, IS 21   - He is not iron deficient   - Continue Epo 5000 unit(s) IV on HD. He is likely quite Epo resistant given infection/inflammatory state     Recommendations were communicated to primary team via progress note    Tory Toledo, NP   [phone]    Interval History :   Nursing  "and provider notes from last 24 hours reviewed.  No acute renal changes   Scheduled for routine HD today    Review of Systems:   I reviewed the following systems:  Reports feeling tired  Denies CP, dyspnea, edema  ON TF   Anuric    Physical Exam:   I/O last 3 completed shifts:  In: 395 [NG/GT:80]  Out: 1500 [Emesis/NG output:1500]   BP 96/57   Pulse 105   Temp 97.5  F (36.4  C) (Oral)   Resp 13   Ht 1.676 m (5' 6\")   Wt 63.5 kg (139 lb 15.9 oz)   SpO2 94%   BMI 22.60 kg/m       GENERAL APPEARANCE: Comfortable  EYES:  scleral icterus, pupils equal  Pulmonary: CTA   CV: tachy   - Edema - no LE edema  GI: Non tender. PEG present.   SKIN: no rash, warm, dry, no cyanosis  NEURO: alert/interactive  ACCESS: Right TDC     Labs:   All labs reviewed by me  Electrolytes/Renal -   Recent Labs   Lab Test 09/27/21  1155 09/27/21  0339 09/27/21  0247 09/27/21  0029 09/26/21 2012 09/26/21  1627 09/26/21  0705 09/25/21  1247 09/25/21  0710 09/25/21  0710 09/24/21  2231 09/24/21  0847 09/23/21  1017 09/22/21  0527   * 121*  120*  --  121*   < > 120*  --   --    < > 126*  --   --    < > 126*   POTASSIUM  --  4.1  --   --   --  4.6 3.1*   < >  --  3.3*  --   --    < > 4.6   CHLORIDE  --  86*  --   --   --  88*  --   --   --  92*  --   --    < > 88*   CO2  --  18*  --   --   --  19*  --   --   --  24  --   --    < > 23   BUN  --  73*  --   --   --  66*  --   --   --  39*  --   --    < > 71*   CR  --  4.97*  --   --   --  4.44*  --   --   --  2.72*  --   --    < > 3.72*   GLC  --  95 101*  --   --  87  --    < >   < > 123*   < >  --    < > 104*   JANENE  --  10.3*  --   --   --  10.5*  --   --   --  9.6  --   --    < > 9.5   MAG  --   --   --  2.4*  --   --  2.3  --   --  2.1  --  2.3   < > 2.3   PHOS  --   --   --   --   --   --  6.2*  --   --   --   --  8.5*  --  8.3*    < > = values in this interval not displayed.       CBC -   Recent Labs   Lab Test 09/26/21  0705 09/24/21  0847 09/23/21  1030   WBC 11.2* 9.4 11.3*   HGB " 9.3* 9.3* 9.1*    253 246       LFTs -   Recent Labs   Lab Test 09/04/21  0414 09/03/21  0523 09/02/21  1730   ALKPHOS 81 95 120   BILITOTAL 1.4* 2.0* 2.2*   ALT 29 31 40   AST 38 39 57*   PROTTOTAL 8.8 9.2* 10.7*   ALBUMIN 2.9* 2.3* 2.7*       Iron Panel -   Recent Labs   Lab Test 08/14/21 2055 07/19/21  0632   IRON 28* 31*   IRONSAT 21 43   JERRELL 2,186*  --          Imaging:    EXAMINATION: XR CHEST PORT 1 VIEW, 9/19/2021 5:01 PM     COMPARISON: 9/16/2020 oh     HISTORY: follow-up on right-sided thoracentesis     FINDINGS: Decreased right pleural effusion following thoracentesis  with moderate pleural effusion remaining. Left PICC line tip is in the  low SVC. Right IJ lumen catheter tip in the low SVC. Heart size is  normal. Left lung is clear. Improved aeration in the right lung.                                                                      IMPRESSION: Decreased pleural fluid on the right with moderate  remaining right pleural effusion.       VIKTOR BRUCE MD     Current Medications:    amoxicillin  500 mg Oral Q24H     sodium bicarbonate  325 mg Per Feeding Tube Q4H    And     amylase-lipase-protease  1-2 capsule Per Feeding Tube Q4H     amylase-lipase-protease  2 capsule Per G Tube TID w/meals     B and C vitamin Complex with folic acid  5 mL Per Feeding Tube Daily     fiber modular (NUTRISOURCE FIBER)  1 packet Per Feeding Tube TID     folic acid  1 mg Oral or Feeding Tube Daily     heparin lock flush  5-20 mL Intracatheter Q24H     melatonin  6 mg Oral or Feeding Tube At Bedtime     menthol   Transdermal Q8H     methylphenidate  5 mg Per Feeding Tube BID     midodrine  10 mg Per Feeding Tube TID     ondansetron  4 mg Oral TID    Or     ondansetron  4 mg Intravenous TID     oxyCODONE  5 mg Oral or Feeding Tube Q8H     pantoprazole (PROTONIX) IV  40 mg Intravenous Daily with breakfast     protein modular  1 packet Per Feeding Tube Daily     sevelamer carbonate (RENVELA)  1.6 g Oral or Feeding  Tube TID     sodium chloride (PF)  9 mL Intracatheter During Dialysis/CRRT (from stock)     sodium chloride (PF)  9 mL Intracatheter During Dialysis/CRRT (from stock)     vitamin B1  100 mg Oral or Feeding Tube Daily    Or     thiamine  100 mg Intravenous Daily       - MEDICATION INSTRUCTIONS -       dextrose       heparin (porcine) 1,000 Units/hr (08/24/21 0907)     Tory Toledo, NP

## 2021-09-28 ENCOUNTER — APPOINTMENT (OUTPATIENT)
Dept: GENERAL RADIOLOGY | Facility: CLINIC | Age: 31
End: 2021-09-28
Attending: PHYSICIAN ASSISTANT
Payer: MEDICAID

## 2021-09-28 ENCOUNTER — APPOINTMENT (OUTPATIENT)
Dept: OCCUPATIONAL THERAPY | Facility: CLINIC | Age: 31
End: 2021-09-28
Attending: INTERNAL MEDICINE
Payer: MEDICAID

## 2021-09-28 LAB
ALBUMIN SERPL-MCNC: 2.5 G/DL (ref 3.4–5)
ANION GAP SERPL CALCULATED.3IONS-SCNC: 13 MMOL/L (ref 3–14)
BACTERIA BLD CULT: NO GROWTH
BACTERIA BLD CULT: NO GROWTH
BASE EXCESS BLDV CALC-SCNC: 0.1 MMOL/L (ref -7.7–1.9)
BUN SERPL-MCNC: 37 MG/DL (ref 7–30)
CALCIUM SERPL-MCNC: 9.5 MG/DL (ref 8.5–10.1)
CHLORIDE BLD-SCNC: 92 MMOL/L (ref 94–109)
CO2 SERPL-SCNC: 23 MMOL/L (ref 20–32)
CREAT SERPL-MCNC: 3.17 MG/DL (ref 0.66–1.25)
ERYTHROCYTE [DISTWIDTH] IN BLOOD BY AUTOMATED COUNT: 15.8 % (ref 10–15)
GFR SERPL CREATININE-BSD FRML MDRD: 25 ML/MIN/1.73M2
GLUCOSE BLD-MCNC: 130 MG/DL (ref 70–99)
HCO3 BLDV-SCNC: 25 MMOL/L (ref 21–28)
HCT VFR BLD AUTO: 27.5 % (ref 40–53)
HGB BLD-MCNC: 8.7 G/DL (ref 13.3–17.7)
LACTATE SERPL-SCNC: 1.4 MMOL/L (ref 0.7–2)
LACTATE SERPL-SCNC: 2.1 MMOL/L (ref 0.7–2)
MAGNESIUM SERPL-MCNC: 1.8 MG/DL (ref 1.6–2.3)
MCH RBC QN AUTO: 30.3 PG (ref 26.5–33)
MCHC RBC AUTO-ENTMCNC: 31.6 G/DL (ref 31.5–36.5)
MCV RBC AUTO: 96 FL (ref 78–100)
O2/TOTAL GAS SETTING VFR VENT: 50 %
PCO2 BLDV: 42 MM HG (ref 40–50)
PH BLDV: 7.38 [PH] (ref 7.32–7.43)
PHOSPHATE SERPL-MCNC: 4.4 MG/DL (ref 2.5–4.5)
PLATELET # BLD AUTO: 257 10E3/UL (ref 150–450)
PO2 BLDV: 41 MM HG (ref 25–47)
POTASSIUM BLD-SCNC: 3.1 MMOL/L (ref 3.4–5.3)
RBC # BLD AUTO: 2.87 10E6/UL (ref 4.4–5.9)
SODIUM SERPL-SCNC: 128 MMOL/L (ref 133–144)
WBC # BLD AUTO: 8.4 10E3/UL (ref 4–11)

## 2021-09-28 PROCEDURE — 120N000002 HC R&B MED SURG/OB UMMC

## 2021-09-28 PROCEDURE — 250N000011 HC RX IP 250 OP 636: Performed by: INTERNAL MEDICINE

## 2021-09-28 PROCEDURE — 99233 SBSQ HOSP IP/OBS HIGH 50: CPT | Mod: GC | Performed by: STUDENT IN AN ORGANIZED HEALTH CARE EDUCATION/TRAINING PROGRAM

## 2021-09-28 PROCEDURE — 87040 BLOOD CULTURE FOR BACTERIA: CPT | Performed by: PHYSICIAN ASSISTANT

## 2021-09-28 PROCEDURE — 250N000011 HC RX IP 250 OP 636: Performed by: STUDENT IN AN ORGANIZED HEALTH CARE EDUCATION/TRAINING PROGRAM

## 2021-09-28 PROCEDURE — 250N000013 HC RX MED GY IP 250 OP 250 PS 637: Performed by: INTERNAL MEDICINE

## 2021-09-28 PROCEDURE — 36592 COLLECT BLOOD FROM PICC: CPT

## 2021-09-28 PROCEDURE — 82803 BLOOD GASES ANY COMBINATION: CPT

## 2021-09-28 PROCEDURE — P9041 ALBUMIN (HUMAN),5%, 50ML: HCPCS | Performed by: PHYSICIAN ASSISTANT

## 2021-09-28 PROCEDURE — 250N000013 HC RX MED GY IP 250 OP 250 PS 637: Performed by: STUDENT IN AN ORGANIZED HEALTH CARE EDUCATION/TRAINING PROGRAM

## 2021-09-28 PROCEDURE — 83735 ASSAY OF MAGNESIUM: CPT | Performed by: INTERNAL MEDICINE

## 2021-09-28 PROCEDURE — 97535 SELF CARE MNGMENT TRAINING: CPT | Mod: GO | Performed by: OCCUPATIONAL THERAPIST

## 2021-09-28 PROCEDURE — 97110 THERAPEUTIC EXERCISES: CPT | Mod: GO | Performed by: OCCUPATIONAL THERAPIST

## 2021-09-28 PROCEDURE — 250N000011 HC RX IP 250 OP 636: Performed by: HOSPITALIST

## 2021-09-28 PROCEDURE — 84100 ASSAY OF PHOSPHORUS: CPT

## 2021-09-28 PROCEDURE — 71045 X-RAY EXAM CHEST 1 VIEW: CPT | Mod: 26 | Performed by: RADIOLOGY

## 2021-09-28 PROCEDURE — 250N000011 HC RX IP 250 OP 636: Performed by: PHYSICIAN ASSISTANT

## 2021-09-28 PROCEDURE — 99231 SBSQ HOSP IP/OBS SF/LOW 25: CPT | Performed by: PHYSICIAN ASSISTANT

## 2021-09-28 PROCEDURE — 36592 COLLECT BLOOD FROM PICC: CPT | Performed by: PHYSICIAN ASSISTANT

## 2021-09-28 PROCEDURE — 82310 ASSAY OF CALCIUM: CPT

## 2021-09-28 PROCEDURE — 99233 SBSQ HOSP IP/OBS HIGH 50: CPT | Mod: GC | Performed by: INTERNAL MEDICINE

## 2021-09-28 PROCEDURE — C9113 INJ PANTOPRAZOLE SODIUM, VIA: HCPCS | Performed by: INTERNAL MEDICINE

## 2021-09-28 PROCEDURE — 83605 ASSAY OF LACTIC ACID: CPT | Performed by: STUDENT IN AN ORGANIZED HEALTH CARE EDUCATION/TRAINING PROGRAM

## 2021-09-28 PROCEDURE — 83605 ASSAY OF LACTIC ACID: CPT | Performed by: PHYSICIAN ASSISTANT

## 2021-09-28 PROCEDURE — 36415 COLL VENOUS BLD VENIPUNCTURE: CPT | Performed by: PHYSICIAN ASSISTANT

## 2021-09-28 PROCEDURE — 90791 PSYCH DIAGNOSTIC EVALUATION: CPT | Mod: HN | Performed by: STUDENT IN AN ORGANIZED HEALTH CARE EDUCATION/TRAINING PROGRAM

## 2021-09-28 PROCEDURE — 250N000013 HC RX MED GY IP 250 OP 250 PS 637

## 2021-09-28 PROCEDURE — 87070 CULTURE OTHR SPECIMN AEROBIC: CPT | Performed by: PHYSICIAN ASSISTANT

## 2021-09-28 PROCEDURE — 250N000011 HC RX IP 250 OP 636

## 2021-09-28 PROCEDURE — 85014 HEMATOCRIT: CPT

## 2021-09-28 PROCEDURE — 71045 X-RAY EXAM CHEST 1 VIEW: CPT

## 2021-09-28 RX ORDER — ALBUMIN, HUMAN INJ 5% 5 %
25 SOLUTION INTRAVENOUS ONCE
Status: COMPLETED | OUTPATIENT
Start: 2021-09-28 | End: 2021-09-28

## 2021-09-28 RX ORDER — LIDOCAINE 40 MG/G
CREAM TOPICAL
Status: DISCONTINUED | OUTPATIENT
Start: 2021-09-28 | End: 2021-10-12 | Stop reason: HOSPADM

## 2021-09-28 RX ADMIN — PANCRELIPASE 2 CAPSULE: 24000; 76000; 120000 CAPSULE, DELAYED RELEASE PELLETS ORAL at 01:34

## 2021-09-28 RX ADMIN — MIDODRINE HYDROCHLORIDE 10 MG: 5 TABLET ORAL at 13:05

## 2021-09-28 RX ADMIN — PANCRELIPASE 2 CAPSULE: 24000; 76000; 120000 CAPSULE, DELAYED RELEASE PELLETS ORAL at 20:00

## 2021-09-28 RX ADMIN — Medication 1 PACKET: at 08:58

## 2021-09-28 RX ADMIN — Medication 5 ML: at 09:55

## 2021-09-28 RX ADMIN — Medication 1 PACKET: at 13:12

## 2021-09-28 RX ADMIN — SEVELAMER CARBONATE 1.6 G: 800 POWDER, FOR SUSPENSION ORAL at 20:00

## 2021-09-28 RX ADMIN — ACETAMINOPHEN 325 MG: 325 SOLUTION ORAL at 08:56

## 2021-09-28 RX ADMIN — PANCRELIPASE 2 CAPSULE: 24000; 76000; 120000 CAPSULE, DELAYED RELEASE PELLETS ORAL at 04:59

## 2021-09-28 RX ADMIN — PANCRELIPASE 2 CAPSULE: 24000; 76000; 120000 CAPSULE, DELAYED RELEASE PELLETS ORAL at 16:45

## 2021-09-28 RX ADMIN — SODIUM BICARBONATE 325 MG: 325 TABLET ORAL at 16:45

## 2021-09-28 RX ADMIN — METHYLPHENIDATE HYDROCHLORIDE 5 MG: 5 TABLET ORAL at 08:56

## 2021-09-28 RX ADMIN — SODIUM BICARBONATE 325 MG: 325 TABLET ORAL at 20:00

## 2021-09-28 RX ADMIN — CALCIUM CARBONATE (ANTACID) CHEW TAB 500 MG 500 MG: 500 CHEW TAB at 01:34

## 2021-09-28 RX ADMIN — HYDROMORPHONE HYDROCHLORIDE 1 MG: 1 SOLUTION ORAL at 08:02

## 2021-09-28 RX ADMIN — ONDANSETRON 4 MG: 2 INJECTION INTRAMUSCULAR; INTRAVENOUS at 19:55

## 2021-09-28 RX ADMIN — METHYLPHENIDATE HYDROCHLORIDE 5 MG: 5 TABLET ORAL at 12:42

## 2021-09-28 RX ADMIN — FOLIC ACID 1 MG: 1 TABLET ORAL at 08:56

## 2021-09-28 RX ADMIN — THIAMINE HCL TAB 100 MG 100 MG: 100 TAB at 08:56

## 2021-09-28 RX ADMIN — PANCRELIPASE 2 CAPSULE: 24000; 76000; 120000 CAPSULE, DELAYED RELEASE PELLETS ORAL at 13:05

## 2021-09-28 RX ADMIN — SODIUM BICARBONATE 325 MG: 325 TABLET ORAL at 13:05

## 2021-09-28 RX ADMIN — HYDROXYZINE HYDROCHLORIDE 50 MG: 25 TABLET, FILM COATED ORAL at 15:43

## 2021-09-28 RX ADMIN — OXYCODONE HYDROCHLORIDE 5 MG: 5 SOLUTION ORAL at 04:59

## 2021-09-28 RX ADMIN — Medication 5 ML: at 08:03

## 2021-09-28 RX ADMIN — Medication 1 PACKET: at 19:55

## 2021-09-28 RX ADMIN — OXYCODONE HYDROCHLORIDE 5 MG: 5 SOLUTION ORAL at 12:41

## 2021-09-28 RX ADMIN — PROCHLORPERAZINE EDISYLATE 5 MG: 5 INJECTION INTRAMUSCULAR; INTRAVENOUS at 05:02

## 2021-09-28 RX ADMIN — ALBUMIN HUMAN 25 G: 0.05 INJECTION, SOLUTION INTRAVENOUS at 12:41

## 2021-09-28 RX ADMIN — SODIUM BICARBONATE 325 MG: 325 TABLET ORAL at 01:34

## 2021-09-28 RX ADMIN — SEVELAMER CARBONATE 1.6 G: 800 POWDER, FOR SUSPENSION ORAL at 16:45

## 2021-09-28 RX ADMIN — HYDROMORPHONE HYDROCHLORIDE 0.5 MG: 1 INJECTION, SOLUTION INTRAMUSCULAR; INTRAVENOUS; SUBCUTANEOUS at 13:07

## 2021-09-28 RX ADMIN — PANCRELIPASE 2 CAPSULE: 24000; 76000; 120000 CAPSULE, DELAYED RELEASE PELLETS ORAL at 09:12

## 2021-09-28 RX ADMIN — PANTOPRAZOLE SODIUM 40 MG: 40 INJECTION, POWDER, FOR SOLUTION INTRAVENOUS at 08:08

## 2021-09-28 RX ADMIN — AMOXICILLIN 500 MG: 400 POWDER, FOR SUSPENSION ORAL at 13:11

## 2021-09-28 RX ADMIN — OXYCODONE HYDROCHLORIDE 5 MG: 5 SOLUTION ORAL at 19:54

## 2021-09-28 RX ADMIN — HYDROMORPHONE HYDROCHLORIDE 1 MG: 1 SOLUTION ORAL at 16:45

## 2021-09-28 RX ADMIN — MIDODRINE HYDROCHLORIDE 10 MG: 5 TABLET ORAL at 08:56

## 2021-09-28 RX ADMIN — Medication 15 ML: at 15:44

## 2021-09-28 RX ADMIN — ONDANSETRON 4 MG: 2 INJECTION INTRAMUSCULAR; INTRAVENOUS at 08:07

## 2021-09-28 RX ADMIN — CYCLOBENZAPRINE 10 MG: 10 TABLET, FILM COATED ORAL at 05:02

## 2021-09-28 RX ADMIN — MIDODRINE HYDROCHLORIDE 10 MG: 5 TABLET ORAL at 19:55

## 2021-09-28 RX ADMIN — SEVELAMER CARBONATE 1.6 G: 800 POWDER, FOR SUSPENSION ORAL at 08:57

## 2021-09-28 RX ADMIN — SODIUM BICARBONATE 325 MG: 325 TABLET ORAL at 09:12

## 2021-09-28 RX ADMIN — SODIUM BICARBONATE 325 MG: 325 TABLET ORAL at 04:59

## 2021-09-28 RX ADMIN — ONDANSETRON 4 MG: 2 INJECTION INTRAMUSCULAR; INTRAVENOUS at 15:43

## 2021-09-28 ASSESSMENT — MIFFLIN-ST. JEOR: SCORE: 1538.75

## 2021-09-28 ASSESSMENT — ACTIVITIES OF DAILY LIVING (ADL)
ADLS_ACUITY_SCORE: 16

## 2021-09-28 NOTE — PROGRESS NOTES
[facility]    Progress Note - Daniela 3 Service  Date of Admission:  6/28/2021    Changes today:  - Patient tolerating some clear liquids by mouth  - Sepsis code called due to hypotension and elevated lactate   - blood cultures   - CXR   - wound culture of g-tube   - albumin 25g   - repeat lactate after 1 hour  - Health psychology will see patient today, will follow-up their recs  - Patient's insurance will be active 10/1, can begin looking for long-term care placement then  - Per palliative care rec, discontinued hydromorphone prn IV (can re-order if not tolerating dressing changes without it)  - am cortisol to evaluate for adrenal insufficiency    Assessment & Plan              Dax Villareal is a 30 year old man with hx of alcohol use disorder admitted on 6/28/2021 after recent prolonged admission at [facility] who has severe acute alcoholic hepatitis c/b HE, ESRD requiring HD, and malnutrition in the setting of acute necrotizing pancreatitis, s/p PEG-J placement on 7/13. He arrived with acute respiratory failure and septic shock that have resolved, and his secondary bacterial peritonitis has been appropriately treated. He requires continued management for ESRD, hepatic encephalopathy, infected pancreatic pseudocyst, nutritional support via PEG-J, and persistent right pleural effusion. Clinically improving after multiple necrosectomies. Increasing leukocytosis and hypotension on 9/2 prompting resumption of IV antibiotics - has been stable since that time. Repeat CT with interval improvement in fluid collections.    Necrotizing alcoholic pancreatitis w/ infected pseudocyst, improving  Hepatic Fluid collection, stable to improving  Secondary Bacterial Peritonitis 2/2 infected pseudocyst with VRE, improving  Septic Shock, resolved  Admitted to the [facility] ICU on 6/28/2021 from [facility] for septic shock due to necrotizing pancreatitis. CT on admit  showing mature collection with enhancing wall measuring [id number]. IR placed perc drain 6/29. Repeat imaging 7/18 revealed walled off necrotic collection amenable to endoscopic transluminal drainage with cystgastrostomy stenting with necrosectomy on 7/21/21. Complete necrosectomy on 8/11. Antibiotics (cefepime and metronidazole) were discontinued on 8/25 after left flank drain removed, re-initiated in the setting of fever on 9/2. Favor abdominal etiology, repeat CT on 9/7 with interval improvement in fluid collections - will complete 7d course of abx per ID with EOT 9/8.  - Will likely need intermittent antibiotics with fluid collections occasionally with bacterial growth moving forward as to be expected in nec panc  - Pain - scheduled oxycodone q8H and IV dilaudid for dressing changes  - Palliative care following for pain management and goals of care given overall illness    G-tube Fistula  Dax had been having increasing G-tube output and was found to have an erythematous indurated area next to his G-tube insertion. This was found to be his G-tube balloon pushing through the skin. He underwent G-tube replacement on  9/13/21. His old G-tube site has since had large volume output requiring frequent wound care dressing changes.  - 9/20/21 - EGD to close fistula completed by GI  - Wound team consulted, follow their wound care recs  - Advanced diet 9/27, g-tube to gravity (clamped while eating)    Periapical abscess: L 3rd maxillary molar  Dental Caries  - Dentistry consulted, and discussed with oral surgery  - Amoxicillin for 7d course (9/21-9/27)  - Dental extraction when stable to be transported (while inpatient) to [facility]    Hypovolemia  Patient with substantial daily NG output and poor PO. Also note significant weight loss over the past few weeks. He has needed intermittent boluses of normal saline for euvolemia. Have discussed with renal multiple times - please re-evaluate volume status vs dry  weight as he is often hypotensive following fluid removal.  - Weight 78kg on 8/13. Then 55 kg, weight 68.5kg on 9/22. Concern for weight loss vs. targeting dry weight.  - Monitor vitals and output post dialysis, consider fluids prn.  - Consider discussing using midodrine with Nephrology    Gastric Outlet Obstruction  High G tube output due to the cystogastric connection/fluid drainage noted on 8/5. Per GI, possibly related to gastric outlet from severe abdominal inflammation and is expected. Continues to have mild to moderate NG output - stable to improved. Relieved with draining G tube to gravity when experiencing pain/nausea.    Hyponatremia  Patient with down-trending sodium the week of 8/27; likely due to substantial NG output and low solute intake and hepatic dysfunction. Stable 127-128. Then week of 9/27 hyponatremia (120), nephrology following.    Right Pancreatic Hydrothorax, stable  Increased dyspnea with CXR on 7/19 with further imaging and thoracentesis confirming a hepatic/pancreatic hydrothorax. Thoracentesis PRN for dyspnea, most recently 9/3.    ESRD due to ATN  Hyperphosphatemia  Stage III RADHA due to ATN from septic shock without recovery for >3 months. Line is tunneled dialysis catheter from [facility]. Nephrology dialyzed on Tu, Th, Sa schedule.  - Will need follow-up and outpatient dialysis  - Sevelemer powder TID with TF for hyperphosphatemia  - Discuss if needs dialysis line replacement     Anemia of chronic illness  Hematochezia, resolved  Hemoperitoneum  Baseline Hgb 17. Running between 6-8 in setting of chronic illness, new ESRD with epocrit per Nephrology, frequent lab draws and procedures. Hematochezia on 8/8 after starting standard intensity heparin, now resolved. Paracentesis on 8/21 with hemoperitoneum. No active bleeding. Required a unit of PRBC on 9/4 in the setting of several fluid boluses.   - Goal hgb > 7.0, trend Hgb     Alcohol Use Disorder  Counseled patient on complete alcohol  cessation. Not interested in additional services at this time. Continue daily folic acid and thiamine.  - Ensure patient has resources for treatment at discharge if managing alcohol cessation independently becomes challenging.    Severe malnutrition of chronic illness  Poor oral intake in the setting of necrotizing pancreatitis. PEG tube placed 7/13, replaced 9/14. Continuous tube feeds with goal of 65 ml/hr with continuous pancreatic enzymes. Transitioning to renal formula.   - Continuous tube feeds     Diet: Advance diet as tolerated  DVT Prophylaxis: SCDs  Rdz Catheter: Not present  Central Lines: PRESENT  PICC Triple Lumen 06/28/21 Left-Site Assessment: WDL  CVC Double Lumen Right-Site Assessment: WDL    Code Status: Full Code      Disposition Plan   Expected discharge: 7-8 days  recommended to long term care once adequate pain management/ tolerating PO medications and safe disposition plan/ TCU bed available.     The patient's care was discussed with the Attending Physician, Dr. Ibanez.    Marleny Monk MD  PGY-1, Internal Medicine-Pediatrics  ______________________________________________________________________    Interval History   Patient reports he feels unchanged from previous. He continues to feel fatigued with abdominal pain and some nausea. Tolerated some clear liquids. Would like to sleep.    Data reviewed today: I reviewed all medications, new labs and imaging results over the last 24 hours. I personally reviewed no images or EKG's today.    Physical Exam   Vital Signs: Temp: (!) 96.4  F (35.8  C) Temp src: Oral BP: (!) 89/34 (map 52) Pulse: 101   Resp: 12 SpO2: 97 % O2 Device: Nasal cannula Oxygen Delivery: 5 LPM  Weight: 139 lbs 15.87 oz  General Appearance: Chronically ill appearing, in no acute distress  Respiratory: normal work of breathing on ambient air  Cardiovascular: regular rate  GI: soft ND, tender to palpation around Gtube site, declined examination under the dressing  Skin: erythema  around procedures site  Other: Alert, conversant, fatigued

## 2021-09-28 NOTE — PLAN OF CARE
"Cares 0700 to 1500    BP (!) 100/39 (BP Location: Right arm)   Pulse 95   Temp (!) 96.4  F (35.8  C) (Axillary)   Resp 14   Ht 1.676 m (5' 6\")   Wt 63.5 kg (139 lb 15.9 oz)   SpO2 96%   BMI 22.60 kg/m      Alert & oriented. Endorsed pain in abdomen and RLE. Meds given per MAR. Ice packs applied on RLE with relief. On 4L NC, denied SOB. Hypotensive. Tachycardic this shift. Sepsis triggered, lactic 2.1; albumin given. CXR & wound cultures done. Lactic recheck was 1.4. Large loose BM 1x. Pt declined meals today. On TFs @45 ml/hr with H20 flushes qhr. PEG tube dressing changed, G - gravity, J - TFs. Abdomen fistula (with sutures) dressing changed, cultures sent to lab.     Continue to monitor pt.   "

## 2021-09-28 NOTE — PROGRESS NOTES
"    Gastroenterology Advanced Endoscopy Progress Note 9/28    30 year old male with a history of alcohol use disorder who presented to OSH 5/19 for abdominal pain, nausea and vomiting, and found to have alcohol hepatitis, ESLD on HD, septic shock requiring pressors as well as necrotizing pancreatitis with large evolving necrotic collection. Transferred to [facility] for drainage of presumed infected necrotic collection. S/p EUS transluminal and percutaneous drainage. GI re-consulted management of feeding tube (PEGJ) and GC fistula from previous PEG/J site.     Interim since last evaluated:   Started oral diet last night which went well. Having abdominal pain today.    BP (!) 89/34 (BP Location: Right arm)   Pulse 101   Temp (!) 96.4  F (35.8  C) (Oral)   Resp 12   Ht 1.676 m (5' 6\")   Wt 63.5 kg (139 lb 15.9 oz)   SpO2 97%   BMI 22.60 kg/m    Gen:laying comfortably in bed  Abd: old G tube site covered with saturated bandage but last changed 24 hrs ago, G tube with tube feeds infusing through J port, three T-tags present with dried blood - removed without difficulty     # Poor nutrition s/p PEG-J placement  # Severe malnutrition in the context of chronic illness  GI renotified 9/13 for bulging G-tube. Found to be displaced G-tube with balloon approaching the skin causing pain. Last week has tried pushing in, but failed. Thus, underwent G-tube removal and putting in new PEGJ tube at the new site 9/13/21. Patient has large fistula with draining from the old G-tube site and underwent endoscopic closure with APC and suture gastropexy (9/20/21). No leakage post endo-suture now for one week. OK to advance diet and notify GI team with any changes.     # Necrotizing pancreatitis, related to alcohol use  Supportive care per primary team. No GI intervention required at this time for pancreatitis.     Recommendations:  - OK to advance diet as tolerated  - G tube to gravity prn n/v  - Continue TF via jejunostomy tube   - " Monitor fistula site, change dressing daily and notify service with any recurrent output.   - Continue wound care as per WOCN    GI will follow peripherally, please call with questions or status changes    Current GI Consult Staff: Dr. Quigley    Follow up recommendations:   - TBD based on discharge plan    Brianna Norwood PA-C  Advanced Endoscopy/Pancreaticobiliary GI Service  [facility]  Pager *[phone]  Text Page

## 2021-09-28 NOTE — CONSULTS
Health Psychology                                                                                                                          Margarita Loya, Ph.D., L.P [phone]  Mae Taylor, Ph.D., L.P. [phone]  Jennifer Reyez, Ph.D. [phone]  Kathy Ahumada, Ph.D., L.P. [phone]  Kirt Carrasco, Ph.D., A.B.P.P., L.P. [phone]         Lauren Telelz, Ph.D., L.P. [phone]                [facility] and [facility], 3rd Floor  [address] [place] [address]    Confidential Summary of Inpatient Health Psychology Consultation*    Date of Service:  09/28/21    Referring Provider:  Danilea Rosa  Medicine Team    Reason for Consultation:  Increasing capacity for coping and motivation for participation in cares/treatment.     Sources of Information:  Information was obtained from a clinical interview with the patient and review of medical record.    History of Present Illness:  Per EMR: Dax Villareal is a 30 year old man with hx of alcohol use disorder admitted on 6/28/2021 after recent prolonged admission at [facility] in [place] who has severe acute alcoholic hepatitis c/b HE, ESRD requiring HD, and malnutrition in the setting of acute necrotizing pancreatitis, s/p PEG-J placement on 7/13. He arrived with acute respiratory failure and septic shock that have resolved, and his secondary bacterial peritonitis has been appropriately treated. He requires continued management for ESRD, hepatic encephalopathy, infected pancreatic pseudocyst, nutritional support via PEG-J, and persistent right pleural effusion. Clinically improving after multiple necrosectomies. Increasing leukocytosis and hypotension on 9/2 prompting resumption of IV antibiotics - has been stable since that time. Repeat CT with interval improvement in fluid collections.    Met with Dax and introduced Health Psychology service. Discussed nature of referral. Dax reflected on his current  state of recovery and said that he has been experiencing more steps forward than backwards at this time which he feels hopeful about. Assessed current psychiatric symptoms. He denied depressive symptoms apart from sadness related to loss of independence which he is hopeful to recover. He endorsed symptoms of anxiety that have been persistent throughout admission as well as prior to admission. He said his anxiety is currently slightly higher in the morning (when he thinks about the many things he will need to do in a day) and also at night when he has more time to think. He described worried thinking, mostly related to lack of control and difficulty tolerating unknowns. Worrying can interfere with sleep and lead to irritability. He said anxiety was worse at the beginning of his admission.    Dax described some coping strategies that have been helpful for managing anxiety.  He has listened to YouTube videos of falling rain to help calm himself and he demonstrated some examples of using humor and challenging maladaptive thinking during our visit to cope. Validated emotional experiences and provided additional examples of jose francisco he could use to manage symptoms reported. Encouraged relaxation skills. HE said that Atarax is helpful and he would like to continue this medication after he discharges.    Reviewed Dax's progress with recovery and plans for discharge to a rehab facility.  Currently, Dax reports good motivation for participating in therapies and said that he is making slow but steady progress. He said that in the past weeks it was more difficult to motivate himself to participate in therapies but that now there is a more predictable routine and he is able to participate more consistently. Reviewed factors which can serve as strengths and factors which could be barriers towards progress in recovery. Dax said he plans to live with a friend and their fiance after he discharges and that this would be a good  environment for him.  When asked about what he will likely need to do after discharge to maintain positive health, he was unsure.  He also was unsure what he will do to prevent relapse with alcohol.  He said he feels very confidet that he will not drink again because of everything he has been through medically. Highlighted his months of sobriety with being admitted. Due to long history of drinking, encouraged Dax to think about other supports he can utilize to maintain sobriety such as connecting with AA or therapy.       Medical History:  See below lists for past medical history, past surgical history, and current medications    Past Medical History:   Diagnosis Date     ADHD 2008       Past Surgical History:   Procedure Laterality Date     COMBINED ESOPHAGOSCOPY, GASTROSCOPY, DUODENOSCOPY (EGD), TISSUE APPOSI N/A 9/20/2021    Procedure: ESOPHAGOGASTRODUODENOSCOPY WITH SUTURE FISTULA CLOSURE, argon plasma coagulation;  Surgeon: Jose Quigley MD;  Location: [facility] OR     ENDOSCOPIC INSERTION TUBE GASTROSTOMY N/A 7/13/2021    Procedure: Upper endoscopy, INSERTION, PEG-J TUBE and Gastropexy;  Surgeon: Rayshawn Will MD;  Location: [facility]     ENDOSCOPIC INSERTION TUBE GASTROSTOMY  9/13/2021    Procedure: PEG/J PLACEMENT.;  Surgeon: Rayshawn Will MD;  Location: [facility]     ENDOSCOPIC ULTRASOUND UPPER GASTROINTESTINAL TRACT (GI) N/A 7/21/2021    Procedure: ENDOSCOPIC ULTRASOUND, ESOPHAGOSCOPY / UPPER GASTROINTESTINAL TRACT (GI) with cyst gastrostomy, dilation;  Surgeon: Guru Yecenia Chacko MD;  Location: [facility]     ENDOSCOPIC ULTRASOUND, ESOPHAGOSCOPY, GASTROSCOPY, DUODENOSCOPY (EGD), NECROSECTOMY N/A 8/11/2021    Procedure: ESOPHAGOGASTRODUODENOSCOPY (EGD) with necrosectomy, stent exchange.;  Surgeon: Amadou Beasley MD;  Location: [facility]     ESOPHAGOSCOPY, GASTROSCOPY, DUODENOSCOPY (EGD), COMBINED N/A 7/28/2021    Procedure: ESOPHAGOGASTRODUODENOSCOPY (EGD), gastrostomy apposition,  Necrosectomy, stent exchange.;  Surgeon: Rayshawn Will MD;  [facility]     ESOPHAGOSCOPY, GASTROSCOPY, DUODENOSCOPY (EGD), COMBINED N/A 8/6/2021    Procedure: ESOPHAGOGASTRODUODENOSCOPY (EGD) with necrosectomy, and stents exchanged;  Surgeon: Jose Quigley MD;  Location: [facility]     ESOPHAGOSCOPY, GASTROSCOPY, DUODENOSCOPY (EGD), COMBINED Left 9/13/2021    Procedure: ESOPHAGOGASTRODUODENOSCOPY (EGD), GASTROSTOMY TUBE REMOVAL, FISTULAR CLOSURE ENDOSCOPIC.;  Surgeon: Rayshawn Will MD;  Location: [facility]     IR ABSCESS TUBE CHANGE  7/14/2021     IR PARACENTESIS  7/21/2021     IR SINOGRAM INJECTION DIAGNOSTIC  8/25/2021     ORTHOPEDIC SURGERY Right     fracture repair     REPLACE GASTROSTOMY TUBE, PERCUTANEOUS N/A 7/28/2021    Procedure: gastro-jejunostomy tube exchange;  Surgeon: Rayshawn Will MD;  Location: [facility]       Current Facility-Administered Medications   Medication     acetaminophen (TYLENOL) solution 325 mg     sodium bicarbonate tablet 325 mg    And     amylase-lipase-protease (CREON 24) 85374-99398 units per EC capsule 1-2 capsule     amylase-lipase-protease (CREON 24) 89128-78441 units per EC capsule 2 capsule     B and C vitamin Complex with folic acid (NEPHRONEX) liquid 5 mL     benzonatate (TESSALON) capsule 100 mg     calcium carbonate (TUMS) chewable tablet 500 mg     Cetaphil Moisturizing (CETAPHIL)     cyclobenzaprine (FLEXERIL) tablet 10 mg     Daily 2 GRAM acetaminophen limit, unless fulminent liver failure or transaminases greater than or equal to 300 - 400, then none     dextrose 10% infusion     glucose gel 15-30 g    Or     dextrose 50 % injection 25-50 mL    Or     glucagon injection 1 mg     fiber modular (NUTRISOURCE FIBER) packet 1 packet     folic acid (FOLVITE) tablet 1 mg     guaiFENesin (ROBITUSSIN) 20 mg/mL solution 10 mL     heparin 10,000 units/10 mL infusion (DIALYSIS USE)     heparin lock flush 10 UNIT/ML injection 5-20 mL     heparin lock flush 10  UNIT/ML injection 5-20 mL     HYDROmorphone (STANDARD CONC) (DILAUDID) oral solution 1 mg     hydrOXYzine (ATARAX) tablet 25-50 mg     lidocaine (LMX4) cream     lidocaine (LMX4) cream     lidocaine 1 % 0.1-1 mL     lidocaine 1 % 0.1-1 mL     melatonin liquid 6 mg     menthol (ICY HOT) 5 % patch 1 patch    And     menthol (ICY HOT) Patch in Place     methylphenidate (RITALIN) tablet 5 mg     midodrine (PROAMATINE) tablet 10 mg     naloxone (NARCAN) injection 0.2 mg    Or     naloxone (NARCAN) injection 0.4 mg    Or     naloxone (NARCAN) injection 0.2 mg    Or     naloxone (NARCAN) injection 0.4 mg     nicotine (COMMIT) lozenge 4 mg     ondansetron (ZOFRAN-ODT) ODT tab 4 mg    Or     ondansetron (ZOFRAN) injection 4 mg     oxyCODONE (ROXICODONE) solution 5 mg     pantoprazole (PROTONIX) IV push injection 40 mg     polyethylene glycol (MIRALAX) Packet 17 g     prochlorperazine (COMPAZINE) injection 5 mg     protein modular (PROSOURCE TF Free) 1 packet     sevelamer carbonate (RENVELA) Packet 1.6 g     silver nitrate (ARZOL) Misc     sodium chloride (PF) 0.9% PF flush 10-20 mL     sodium chloride (PF) 0.9% PF flush 3 mL     sodium chloride (PF) 0.9% PF flush 3 mL     sodium chloride (PF) 0.9% PF flush 3 mL     sodium chloride (PF) 0.9% PF flush 3 mL     sodium chloride (PF) 0.9% PF flush 3 mL     sodium chloride (PF) 0.9% PF flush 3 mL     thiamine (B-1) tablet 100 mg    Or     thiamine (B-1) injection 100 mg     zinc oxide (DESITIN) 40 % ointment         Psychiatric History:  Dax reported history of anxiety that he related to acute stressors (I.e. funerals, challenging demands). He said he has taken xanax to manage symptoms in the past, mostly using medication that was prescribed to his mother/sister. He denied history of mood disorders, OCD, PTSD, psychosis, and disordered eating. He denied history of suicidal ideation and self-harm behaviors. He has a history of alcohol use disorder that has not been treated. AUD  symptoms present prior to pandemic but exacerbated in . He also smokes cigarettes.    Dax denied history of outpatient therapy or inpatient psychiatric admissions. Behavioral health consultant attempted attempted contact with Dax in .     Social History:  Dax has four siblings, one of which  by suicide 9 years ago. He grew up in [place] and has been splitting time between the [facility] and [facility]. He completed high school and took an interest in culinary arts.  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 He is currently single. Support from family.     Mental Status/Interview:  Appearance/Behavior/Orientation: Alert and oriented to person, place, time, and situation. No evidence of psychomotor agitation.    Cooperation/Reliability: Patient appeared to honestly respond to questions about psychosocial functioning and is deemed a reliable historian.   Cognition/Memory/Judgment: Not formally assessed, yet no cognitive difficulties on interview. Judgment fair.  Speech/Language: Speech was clear, logical and coherent, of normal rate, rhythm and volume.   Thought Content/Form: Appropriate to interview and situation. Overall logical and organized. .   Mood/Affect: Mood euthymic, anxious at times; affect was mood congruent.    Appetite: Patient described good appetite.    Insight/Motivation: Limited insight, reports good motivation.   Suicide/Assault: Patient denies suicidal or assaultive ideation, plan, or intent.     Impression:  Dax reports history of symptoms of anxiety that have been exacerbated by admission and acute health issues. He is currently managing symptoms with atarax and a few coping skills. He has limited insight into factors which will facilitate maintaining abstinence from alcohol. He reports good motivation for participating in therapies but, again, more limited insight into what will be  required for ongoing recovery.    Diagnosis:  Unspecified anxiety disorder, rule out generalized anxiety disorder  Alcohol use disorder, severe, in early remission in controlled environment    Recommendation/Plan:  Health Psychology will follow-up with patient prior to discharge to continue to address anxiety and engagement in care/therapy.    Visit start time: 1:30  Visit end time: 2:15       Jennifer Reyez, PhD,   Clinical Health Psychology Fellow  Phone: [phone]    *In accordance with the Rules of the [place] Board of [employer], it is noted that psychological descriptions and scientific procedures underlying psychological evaluations have limitations.  Absolute predictions cannot be made based on information in this report.

## 2021-09-28 NOTE — PROGRESS NOTES
[facility]  Palliative Care Daily Progress Note       Recommendations & Counseling       Nausea: multifactorial. Pt states nausea is controlled with current meds (zofran 4 m TID, protonix every day, compazine PRN, atarax PRN). Continue these for now. Note QTc 491    Pain: Well controlled today. Pt getting oxycodone 5 mg TID scheduled and hydromorphone PO PRN. Using PRN hydromorphone about 4 times a day. IV hydromorphone was used before dressing changes but able to do dressing change today without it and IV discontinued. If pain remains controlled, I will discuss with Dax, slowly decreasing opioids with goal of getting him off opioid before discharge.         Thank you for the opportunity to continue to participate in the care of this patient and family.  Please feel free to contact on-call palliative provider with any emergent needs.  We can be reached via team pager [phone] (answered 8-4:30 Monday-Friday); after-hours answering service ([phone]);     This note was initiated by Jason Mejía MS3, medical student. I personally overwrote and edited each section of this note to reflect my sole personal and direct involvement in the patient's care today.    I, Valentina Varela, was present with the medical student who participated in the service and in the documentation of the note.  I have verified the history and personally performed the physical exam and medical decision making.  I agree with the assessment and plan of care as documented in the note and have edited the note to reflect my medical decision making.  I personally reviewed vital signs, medications, labs and imaging.       Physician Attestation:   Total time spent was 35 minutes spent in reviewing record, patient examination and counseling, discussion with primary team and documentation. >50% of time was spent counseling and/or coordination of care regarding symptom management, plan of  care.  Valentina Varela MD / Palliative Medicine / Pager [phone] / [facility] Inpatient Team Consult Pager [phone] (answered 8am-430pm M-F) - ok to text page via [employer] / After-Hours Answering Service [phone] / Palliative Clinic in the [facility] at the [facility] - [phone] (scheduling); [phone] (triage).                 Assessments          Dax Villareal is a 30 year old man with history of alcohol dependence with a prolonged and complex hospitalization for acute alcoholic hepatitis and associated liver failure that has been complicated by RADHA requiring HD, hepatic encephalopathy, SBP causing septic shock, infected necrotizing pancreatitis requiring drainage and multiple necrosectomies, malnutrition requiring PEG-J leading to fistula s/p endoscopic suture gastroplexy, with continue difficulty with pain management and palliative medicine re-consulted to assist with pain management, nausea and goals of care.     Today, the patient was seen for:  acute alcoholic hepatitis and associated liver failure  RADHA requiring HD  Malnutrition  Complex pain   Nausea     Prognosis, Goals, or Advance Care Planning was addressed today with: No.    Mood, coping, and/or meaning in the context of serious illness were addressed today: No.            Interval History:     Chart review/discussion with unit or clinical team members:   RN said that Mr. Villareal had a bad night. Had difficulty sleeping because of abdominal pain. Also notes some right shin burning pain which is believed to be caused by his history of muscle cramps. Nurse administered hydromorphone and also gave Tylenol which Mr. Villareal says helped with the pain. RN does not think that we need to go up on his pain medications. Continues on tube feeds. Dialysis as per nephrology. Pain and nausea controlled with PRN meds.    Per patient or family/caregivers today:  Today Dax states his pain is worsethan it was yesterday. He is very tired because he did  not sleep well last night due to pain. He notes that his pain improved after receiving hydromorphone and Tylenol. His pain is worsened when he tries to move. When asked where his pain is, he waves around the wound on his abdomen. He would like an increase in his pain medication. Regarding his nausea, he denies having any nausea since our last visit yesterday. On repeat visit in the company of Dr. Varela, Dax was in high spirits. He is looking forward to going home and cooking food. He has planned out various meals that he wants to make. Pain is not currently an issue.    Key Palliative Symptoms:   Pain location: abdomen, back  # Pain severity the last 12 hours: moderate  # Dyspnea severity the last 12 hours: none  # Nausea severity the last 12 hours: none    Patient is on opioids: assessed and bowels ok/no needed changes to plan of care today.           Review of Systems:     Besides above, a complete 10+ ROS was reviewed and is unremarkable          Medications:     I have reviewed this patient's medication profile and medications during this hospitalization.    Noted meds:   zofran 4mg TID   protonix 40mg IV every day  Atarax 25-50mg TID PRN   Compazine 5mg IV q6hrs PRN      Oxycodone 5mg q8hrs scheduled   Tylenol PRN   Flexeril PRN   Hydromorphone 1mg PO q4hrs PRN - 5mg x 24 hrs  Hydromorphone 0.5mg IV q6hrs PRN - 1 dose every day past 4 days.            Physical Exam:   Vitals were reviewed  Temp: 97  F (36.1  C) Temp src: Oral BP: (!) 84/30 (MAP 51) Pulse: 112   Resp: 12 SpO2: 96 % O2 Device: Nasal cannula Oxygen Delivery: 5 LPM  Gen: tired, appears chronically ill, uncomfortable because of pain and lack of sleep last night, in NAD  Eyes: Conjunctiva clear. Sclera anicteric.  HENT: NCAT; mucous membranes slightly dry  CV:   Resp: no increased work of breathing, speaking full sentences  Abd: soft, slightly distended, +BS  Msk: no gross deformity  Skin:  trace jaundice, warm and well perfursed  Neuro: A&O x  3; CN II-XII grossly intact;  Mental status/Psych: tired, able to engage in conversation; sensorium intact             Data Reviewed:     Reviewed recent labs and pertinent imaging

## 2021-09-28 NOTE — PROVIDER NOTIFICATION
Provider notified (name): Daniela Rm 3  Reason for notification: Blood pressure 84/30, MAP 51. Sepsis triggered.     Response from provider:

## 2021-09-28 NOTE — CODE/RAPID RESPONSE
"Rapid Response Team Note    Assessment   In assessment a rapid response was called on Dax Villareal due to lactic acidosis. This presentation is likely due to poor lactate clearence and worsened by Hypertension  ESRD on HD  GOO, pancreatic hydrothorax  Recent thoracentesis surgery for pancreatic hydrothorax, now 25 days post operative  alcohol use d/o.   Hx VRE sputum    Plan   -  Albumin 25g   - wound culture from g-tube site  - blood cultures x 2  - CXR port (patient declines STAT CXR so will perform as able)  - repeat lactate 13:30  - low threshold to start broad spec Abx to cover dental infection and sepsis, given hx VRE could start Vanco/Zosyn  -  The [facility] 3 internal medicine primary team was able to be reached and they are in agreement with the above plan.  -  Disposition: The patient will remain on the current unit. We will continue to monitor this patient closely.  -  Reassessment and plan follow-up will be performed by the rapid response team      Brandi Burgess PA-C  [facility] [place] [facility] AMCOM Job Code Contact #[phone]    Hospital Course   Brief Summary of events leading to rapid response:   Hypotension and tachycardia triggered RN-guided lactate draw that returns at 2.1.      Patient sleeping intially and states feeling very tired and nauseated, also complaining of severe abdominal pain next to the g-tube site and notes this was stitched \"last Monday\".  He declines to have us remove the dressing over the g-tube site due to pain and requests dilaudid.  He makes ~5ml of urine/day at BL and denies urinary complaints.     He denies other pains or complaints.    Denies fevers, chills sweats,  Denies bleeding, including hematuria, melena/hematochezia.    Denies chest pain, sob, cough, congestion, sore throat  Denies  new rashes, lumps lesions  Denies changes to bowels and LBM was loose and brown on 9/27      Admission Diagnosis:   Pancreatitis [K85.90]     Physical Exam   Temp: (!) 96.4  F (35.8  C) Temp  Min: " 96.1  F (35.6  C)  Max: 97.6  F (36.4  C)  Resp: 12 Resp  Min: 12  Max: 20  SpO2: 97 % SpO2  Min: 92 %  Max: 98 %  Pulse: 101 Pulse  Min: 97  Max: 131    No data recorded  BP: (!) 85/32 (MAP 46) Systolic (24hrs), Av , Min:84 , Max:116   Diastolic (24hrs), Av, Min:30, Max:73     I/Os: I/O last 3 completed shifts:  In: 335 [I.V.:20]  Out: 700 [Emesis/NG output:700]     Exam:   General: chronically ill appearing  Mental Status: AAOx4.  CV: tachycardic, RRR, no m/r/g  PULM: minimal breath sounds on the right, CTA on the left  GI: declines to have us remove g-tube site dressing at this time, g-tube intact, abdomen is soft, NABS  Extremities: no edema + PT and radial pulses    Significant Results and Procedures   Lactic Acid:   Recent Labs   Lab Test 21  1005 21  0705 21  2131 21  2215 21  2116 21  2106 21  2106   LACT 2.1* 1.1 1.1   < >  --   --   --    LACTS  --   --   --   --  1.1 0.9 1.5    < > = values in this interval not displayed.     CBC:   Recent Labs   Lab Test 21  1005 21  0705 21  0847   WBC 8.4 11.2* 9.4   HGB 8.7* 9.3* 9.3*   HCT 27.5* 28.8* 28.4*    286 253        Sepsis Evaluation   The patient is known to have an infection.  NO EVIDENCE OF SEPSIS at this time.  Vital sign, physical exam, and lab findings are due to poor lactate clearence, however low threshold to start antibiotics if any clinical worsening .

## 2021-09-28 NOTE — PLAN OF CARE
RN assumed cares at [id number]     Temp: (!) 96.1  F (35.6  C) Temp src: Oral BP: (!) 95/32 Pulse: 107   Resp: 16 SpO2: 97 % O2 Device: Nasal cannula Oxygen Delivery: 4 LPM     Neuro: A&O x4. Able to make needs known.   Cardiac: Soft BP, MD aware. Otherwise WDL  Respiratory: LS clear bilaterally, denies SOB.  GI/: HD pt, run completed today, no fluid removed. Pt reports intermittent nausea, scheduled zofran administered with some relief. Pt able to take sips of clear fluids as shift progressed without N/V. G tube ordered changed to drainage by gravity, J tube still infusing TF at 45 mL/hr, pt tolerating well. No BM this shift.   Skin: Abdominal dressing changes completed, purulent drainage noted under old GJ site.  ACE wraps in place on BLE. Still refusing to reposition & get off of bottom.    IV/Drains: L triple lumen PICC, lumens not in use are heparin locked. One time bolus of 200 mL dextrose infusing.   Activity: Assist of 1 in bed, two if pt requests lift.   Nutrition: Pt switched to clear liquid diet, then advance to renal diet as tolerated. Pt eager to try to eat, but does not want to rush things. Pt is currently taking small sips of beverages at bedside, tolerating well. Requesting to potentially try solid foods tomorrow depending on N/V status.   Pain: Pt reports 6-7/10 abdominal pain partially relieved by prn dilaudid & scheduled oxycodone. IV dilaudid administered prior to abdominal dressing change, tolerated well.     Plan of Care: Several changes in orders today, continue to encourage repositioning as pt will allow. Monitor N/V in upcoming shifts as diet advances from NPO to renal. Continue to update team with soft blood pressures & report any changes in condition.

## 2021-09-28 NOTE — PROGRESS NOTES
Nephrology Chart Review:     Rapid response today for hypotension/elevated lactic acid. Primary team pan culturing, given albumin.     Scheduled for routine HD tomorrow.   Given ongoing large G output and hypotension will hold off on fluid removal.     Tory Ortega CNP

## 2021-09-28 NOTE — PLAN OF CARE
"Care assumed: [id number]   Vitals  Pain   BP (!) 95/32 (BP Location: Right arm)   Pulse 107   Temp 97  F (36.1  C) (Oral)   Resp 16   Ht 1.676 m (5' 6\")   Wt 63.5 kg (139 lb 15.9 oz)   SpO2 96% 5LPM   BMI 22.60 kg/m         Pt requests 5L of oxygen   Dilaudid available for pain in abdomen   Scheduled oxy    Activity  Lift    Neuro/Mood  A&Ox 4      Denies numbness and tingling    Cardiac  No signs of acute distress    Respiratory  No signs of acute respiratory distress    GI/  PT on Hemodialysis .  Incontinent to bowel       Diet/ Nutrition  Clear liquid diet  1500mL fluid restriction       Skin, Wounds, LDAs REJI skin; Pt refuses both assessment and repositioning   PICC triple lumen   CVC for dialysis   Peg tube; G to gravity drain J for feeding.   Tube feeds at 45mL/hr goal    Plan of Care  Other notes Follow POC        "

## 2021-09-28 NOTE — PROVIDER NOTIFICATION
09/28/21 1100   Call Information   Date of Call 09/28/21   Time of Call 1057   Name of person requesting the team Génesis    Title of person requesting team RN   RRT Arrival time 1100   Time RRT ended 1130   Reason for call   Type of RRT Adult   Primary reason for call Sepsis suspected   Cardiovascular SBP less than 90   Sepsis Suspected Elevated Lactate level   Was patient transferred from the ED, ICU, or PACU within last 24 hours prior to RRT call? No   SBAR   Situation LA= 2.1   Background ETOH, HE, ESRD on dialysis, G/J tube placement c/b fistula, necrotizing pancreatitis.   Notable History/Conditions End-Stage disease;Organ failure   Assessment A+Ox3, reports feeling fatigued. C/O pain and nausea. refusing old g tube dressing change and assessment by RRT NP. hypotensive    Interventions Labs;Meds;CXR   Patient Outcome   Patient Outcome Stabilized on unit   RRT Team   Attending/Primary/Covering Physician Daniela 3   Date Attending Physician notified 09/28/21   Time Attending Physician notified 1057   Physician(s) VENUS Dotson   Lead RN Rosie Capps   RT N/A   Post RRT Intervention Assessment   Post RRT Assessment Stable/Improved   Date Follow Up Done 09/28/21   Time Follow Up Done 1525

## 2021-09-28 NOTE — PROVIDER NOTIFICATION
Provider notified (name): Daniela Rm 3  Reason for notification: Lactic 2.1, code sepsis called.     Response from provider:

## 2021-09-28 NOTE — PROGRESS NOTES
CLINICAL NUTRITION SERVICES - REASSESSMENT NOTE     Nutrition Prescription    RECOMMENDATIONS FOR MDs/PROVIDERS TO ORDER:  Continue with renal TF support, no changes    Malnutrition Status:    Severe malnutrition in the context of acute on chronic illness     Recommendations already ordered by Registered Dietitian (RD):  None today     Future/Additional Recommendations:  GI status, TF tolerance        EVALUATION OF THE PROGRESS TOWARD GOALS   Diet:   Diet advanced to Clear liquids yesterday (9/27) with 1500 ml FR   Was NPO x 1 week over the past week post fistula closure surgery  Previously on dialysis diet with minimal po intake     Nutrition Support: ( on TF since 6/30 admit)  Access: Jejunostomy tube of the PEG/J replaced 9/13/21 (G-tube open to gravity at all times).   Dosing wt: 65 kg more consistent wt this admit on 9/18/21     EN: TF switched to renal formula last week (Novasource Renal @ 45 ml/hr)  --Novasource Renal @ 45 ml/hr (1080 ml) provides 2160 kcal (33 kcal/kg),  98 gm pro (1.5 gm/kg ), 198 gm CHO, 108 gm fat, 774 ml free water, and 0 gm fiber daily.      Modules:   --Protein modules: Prosource TF free 1 packet daily via FT ( 90 kcal, 15 gm protein)  --PERT:  Creon 24, 2 capsules + 325 mg bicarb solution, Q 4 hours via FT. Providing 2667 units lipase/total gm fat in tube feeds.   --Fiber: Continue with Nutrisource fiber 1 packet TID      FWF: 25 ml/hr via feed and flush = 600 ml daily FWF      Intake:   PO: Clear liquid diet for comfort.  Pt declined meals today.     EN:  On TFs @ 45 ml/hr. G-tube to gravity and J-tube for TFs. Abdomen fistula (with sutures). Has intermittent nausea in which, patient hold tube feeds.     Average 7 day of EN intake: 760 ml tube feed daily ( met 70% of the TF goal volume) => provided on average ~ 1512 kcal/day( 23 kcal/kg) and 69 gm protein/day (1.1 gm/kg)    TF + Prosource Free => provided 1600 kcal/day (25 kcal/kg) and 84 gm protein ( 1.3 gm/kg).       ASSESSED  NUTRITION NEEDS per 65 kg on 9/18  Estimated Energy Needs: 1950- 2275 kcals/day, (30-35 kcals/kg)   Justification: Hypermetabolism with chronic illness, ( pancreatitis/HD/ESLD)  Estimated Protein Needs: 98++ g/day (1.5 ++ g/kg)   Justification: Pancreatitis and Hypercatabolism with critical illness / HD repletion,    Estimated Fluid Needs: Per provider pending fluid status (ESLD/HD/Anuric)       FINDINGS   Chart reviewed:   -ESLD with HE and HRS, requiring HD ( has been on HD since 5/26/21),  necrotizing pancreatitis/infected pseudocyst, s/p PEG-J placement on 7/13/21.     --Admitted secondary to SBP (treated)  --ESRD ( Anuric and on HD)  --HE, Mental status improved with aggressive lactulose and rifaximin, which was discontinued.    --Necrotizing pancreatitis with infected pancreatic pseudocyst s/p RP Drain and Cystogastrostomy, and persistent right pleural effusion. Had GI stenting of pancreas x4. Multiple Necrosectomies, last one on 8/11. 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 Patient has large fistula with draining from the old G-tube site and underwent endoscopic closure with APC and suture gastropexy (9/20/21). No leakage post suture. Okay to start to advance diet per GI.       WOC RN note on 9/17:   Abdominal wounds due to Surgical Wound  Status: peristomal skin covered with urostomy pouch so unable to assess skin. Patient not interested in having me assess it as it feels better. RN stated that skin was red and intact    WT trend:   Wt's are unreliable. Per discussion with nursing staff, likely due to bed wt not placed at zero. EDW is not available.   -Pre HD run weight 66.5 kg (146 lb 9.7 oz) on 9/20. Admission weight 80.2 kg (6/28/21). EDW not available. Most recent wt today is  63.5 kg (139 lb 15.9 oz) on .   -- Significant wt loss since admit per wt trend. Patient with substantial high daily g-tube output due to GOO and frequent hold on TF support.   -Receiving limited water flushes and on HD      Meds:  Sodium Bicarbonate/Creon with TFs, Creon with meals,   Nephronex 5 ml daily per FT, calcium carbonate 500 mg daily, Sevelamer carbonate 0.8 gm packet TID PO  Remains on  thiamine, Folate.    Labs:   CRP: 47 () and trending down, WBC:11.2 ()   Fecal elastase: 162 (L) on    BUN:37 (H), Cr:3.17, Na+: 128(L)-> On HD ->   -> (K+:3.1 (L),  Mg++:1.8, Phos:4.4 ( normal range with switch to renal TF support)   Bicarb: 23 (normal range)   Total bili: 1.4 (on ), INR: 1.51 (on )   B (elevated)     GI:  Off Lactulose/Rifaxamin.   Having 2-4 stool daily despite Nutrisource fiber + PERT  Not on any anti motility agents     MALNUTRITION  % Intake: On TF's with frequent interruptions, <75%>1 month - Severe   % Weight Loss: 31% net wt loss since admit ~ 3 month   Subcutaneous Fat Loss: Global Severe / cachectic appearing  Muscle Loss: Global Severe / cachectic appearing  Fluid Accumulation/Edema: 1+ Generalized edema ( trace), 0 BLE   Malnutrition Diagnosis: Severe malnutrition in the context of Chronic condition     Previous Goals   Total avg nutritional intake to meet a minimum of 35 kcal/kg and 1.5 gm PRO/kg daily (per dosing wt 55 kg dry wt on 21 ).  Evaluation: Not met     Previous Nutrition Diagnosis  Inadequate energy/protein intake likely associated with daily interruption to TF support, (patient holds TF frequently for HD and with nausea) with increase needs associated with increased metabolic demand with chronic illness,  not meeting daily goal TF volume daily as evidenced by average 6 days TF met 56% of goal TF volume and PO remained minimal   Evaluation: No change    CURRENT NUTRITION DIAGNOSIS  Inadequate energy/protein intake likely associated with daily  interruption to TF support, (TF frequently on hold with HD and with nausea) with increase estimated kcal/protein needs associated with increased metabolic demand with chronic illness,  not meeting daily goal TF volume as evidenced by average 7 days TF met 70 % of goal TF volume and PO remained minimal     INTERVENTIONS  Implementation  Enteral Nutrition:No changes to current feeds     Goals  Total avg nutritional intake to meet a minimum of 30 kcal/kg and 1.5 gm PRO/kg daily (per dosing wt 65 kg wt on 9/13/21 ).    Monitoring/Evaluation  Progress toward goals will be monitored and evaluated per protocol.      Jesica Blunt RD/MARYELLEN  Pager [phone]

## 2021-09-29 LAB
ANION GAP SERPL CALCULATED.3IONS-SCNC: 14 MMOL/L (ref 3–14)
ANION GAP SERPL CALCULATED.3IONS-SCNC: 6 MMOL/L (ref 3–14)
BUN SERPL-MCNC: 20 MG/DL (ref 7–30)
BUN SERPL-MCNC: 56 MG/DL (ref 7–30)
CA-I BLD-MCNC: 5 MG/DL (ref 4.4–5.2)
CALCIUM SERPL-MCNC: 10.2 MG/DL (ref 8.5–10.1)
CALCIUM SERPL-MCNC: 8.8 MG/DL (ref 8.5–10.1)
CHLORIDE BLD-SCNC: 89 MMOL/L (ref 94–109)
CHLORIDE BLD-SCNC: 97 MMOL/L (ref 94–109)
CO2 SERPL-SCNC: 22 MMOL/L (ref 20–32)
CO2 SERPL-SCNC: 27 MMOL/L (ref 20–32)
CORTIS SERPL-MCNC: 9 UG/DL (ref 4–22)
CREAT SERPL-MCNC: 2.11 MG/DL (ref 0.66–1.25)
CREAT SERPL-MCNC: 4.29 MG/DL (ref 0.66–1.25)
GFR SERPL CREATININE-BSD FRML MDRD: 17 ML/MIN/1.73M2
GFR SERPL CREATININE-BSD FRML MDRD: 40 ML/MIN/1.73M2
GLUCOSE BLD-MCNC: 126 MG/DL (ref 70–99)
GLUCOSE BLD-MCNC: 94 MG/DL (ref 70–99)
GLUCOSE BLDC GLUCOMTR-MCNC: 113 MG/DL (ref 70–99)
HOLD SPECIMEN: NORMAL
MAGNESIUM SERPL-MCNC: 2.1 MG/DL (ref 1.6–2.3)
POTASSIUM BLD-SCNC: 3.4 MMOL/L (ref 3.4–5.3)
POTASSIUM BLD-SCNC: 4.2 MMOL/L (ref 3.4–5.3)
SARS-COV-2 RNA RESP QL NAA+PROBE: NEGATIVE
SODIUM SERPL-SCNC: 125 MMOL/L (ref 133–144)
SODIUM SERPL-SCNC: 130 MMOL/L (ref 133–144)

## 2021-09-29 PROCEDURE — 82533 TOTAL CORTISOL: CPT

## 2021-09-29 PROCEDURE — 250N000011 HC RX IP 250 OP 636: Performed by: HOSPITALIST

## 2021-09-29 PROCEDURE — C9113 INJ PANTOPRAZOLE SODIUM, VIA: HCPCS | Performed by: INTERNAL MEDICINE

## 2021-09-29 PROCEDURE — 250N000013 HC RX MED GY IP 250 OP 250 PS 637: Performed by: STUDENT IN AN ORGANIZED HEALTH CARE EDUCATION/TRAINING PROGRAM

## 2021-09-29 PROCEDURE — 250N000013 HC RX MED GY IP 250 OP 250 PS 637: Performed by: INTERNAL MEDICINE

## 2021-09-29 PROCEDURE — 99233 SBSQ HOSP IP/OBS HIGH 50: CPT | Mod: GC | Performed by: INTERNAL MEDICINE

## 2021-09-29 PROCEDURE — 250N000011 HC RX IP 250 OP 636: Performed by: INTERNAL MEDICINE

## 2021-09-29 PROCEDURE — 90937 HEMODIALYSIS REPEATED EVAL: CPT

## 2021-09-29 PROCEDURE — 36592 COLLECT BLOOD FROM PICC: CPT

## 2021-09-29 PROCEDURE — 250N000011 HC RX IP 250 OP 636

## 2021-09-29 PROCEDURE — G0463 HOSPITAL OUTPT CLINIC VISIT: HCPCS

## 2021-09-29 PROCEDURE — 250N000013 HC RX MED GY IP 250 OP 250 PS 637

## 2021-09-29 PROCEDURE — 634N000001 HC RX 634

## 2021-09-29 PROCEDURE — 83735 ASSAY OF MAGNESIUM: CPT | Performed by: INTERNAL MEDICINE

## 2021-09-29 PROCEDURE — U0005 INFEC AGEN DETEC AMPLI PROBE: HCPCS

## 2021-09-29 PROCEDURE — 36592 COLLECT BLOOD FROM PICC: CPT | Performed by: STUDENT IN AN ORGANIZED HEALTH CARE EDUCATION/TRAINING PROGRAM

## 2021-09-29 PROCEDURE — 250N000011 HC RX IP 250 OP 636: Performed by: STUDENT IN AN ORGANIZED HEALTH CARE EDUCATION/TRAINING PROGRAM

## 2021-09-29 PROCEDURE — 82330 ASSAY OF CALCIUM: CPT

## 2021-09-29 PROCEDURE — 80048 BASIC METABOLIC PNL TOTAL CA: CPT | Performed by: INTERNAL MEDICINE

## 2021-09-29 PROCEDURE — P9041 ALBUMIN (HUMAN),5%, 50ML: HCPCS

## 2021-09-29 PROCEDURE — 250N000013 HC RX MED GY IP 250 OP 250 PS 637: Performed by: HOSPITALIST

## 2021-09-29 PROCEDURE — 99233 SBSQ HOSP IP/OBS HIGH 50: CPT | Mod: GC | Performed by: STUDENT IN AN ORGANIZED HEALTH CARE EDUCATION/TRAINING PROGRAM

## 2021-09-29 PROCEDURE — 80048 BASIC METABOLIC PNL TOTAL CA: CPT

## 2021-09-29 PROCEDURE — 99233 SBSQ HOSP IP/OBS HIGH 50: CPT | Performed by: INTERNAL MEDICINE

## 2021-09-29 PROCEDURE — 120N000002 HC R&B MED SURG/OB UMMC

## 2021-09-29 RX ORDER — ALBUMIN, HUMAN INJ 5% 5 %
250 SOLUTION INTRAVENOUS
Status: COMPLETED | OUTPATIENT
Start: 2021-09-29 | End: 2021-09-29

## 2021-09-29 RX ORDER — OXYCODONE HCL 5 MG/5 ML
2.5-5 SOLUTION, ORAL ORAL
Status: DISCONTINUED | OUTPATIENT
Start: 2021-09-29 | End: 2021-10-01

## 2021-09-29 RX ORDER — POTASSIUM CHLORIDE 1.5 G/1.58G
40 POWDER, FOR SOLUTION ORAL ONCE
Status: COMPLETED | OUTPATIENT
Start: 2021-09-29 | End: 2021-09-29

## 2021-09-29 RX ORDER — ALBUMIN (HUMAN) 12.5 G/50ML
50 SOLUTION INTRAVENOUS
Status: DISCONTINUED | OUTPATIENT
Start: 2021-09-29 | End: 2021-09-29

## 2021-09-29 RX ADMIN — SEVELAMER CARBONATE 1.6 G: 800 POWDER, FOR SUSPENSION ORAL at 20:23

## 2021-09-29 RX ADMIN — SODIUM BICARBONATE 325 MG: 325 TABLET ORAL at 20:23

## 2021-09-29 RX ADMIN — HYDROXYZINE HYDROCHLORIDE 50 MG: 25 TABLET, FILM COATED ORAL at 16:08

## 2021-09-29 RX ADMIN — Medication 15 ML: at 16:09

## 2021-09-29 RX ADMIN — Medication 1 PACKET: at 08:55

## 2021-09-29 RX ADMIN — SODIUM BICARBONATE 325 MG: 325 TABLET ORAL at 05:13

## 2021-09-29 RX ADMIN — PANCRELIPASE 2 CAPSULE: 24000; 76000; 120000 CAPSULE, DELAYED RELEASE PELLETS ORAL at 20:23

## 2021-09-29 RX ADMIN — HYDROXYZINE HYDROCHLORIDE 50 MG: 25 TABLET, FILM COATED ORAL at 20:23

## 2021-09-29 RX ADMIN — METHYLPHENIDATE HYDROCHLORIDE 5 MG: 5 TABLET ORAL at 14:24

## 2021-09-29 RX ADMIN — ACETAMINOPHEN 325 MG: 325 SOLUTION ORAL at 10:36

## 2021-09-29 RX ADMIN — SODIUM BICARBONATE 325 MG: 325 TABLET ORAL at 16:08

## 2021-09-29 RX ADMIN — METHYLPHENIDATE HYDROCHLORIDE 5 MG: 5 TABLET ORAL at 08:45

## 2021-09-29 RX ADMIN — ONDANSETRON 4 MG: 2 INJECTION INTRAMUSCULAR; INTRAVENOUS at 08:44

## 2021-09-29 RX ADMIN — PANCRELIPASE 2 CAPSULE: 24000; 76000; 120000 CAPSULE, DELAYED RELEASE PELLETS ORAL at 08:35

## 2021-09-29 RX ADMIN — THIAMINE HCL TAB 100 MG 100 MG: 100 TAB at 08:44

## 2021-09-29 RX ADMIN — MIDODRINE HYDROCHLORIDE 10 MG: 5 TABLET ORAL at 08:43

## 2021-09-29 RX ADMIN — MIDODRINE HYDROCHLORIDE 10 MG: 5 TABLET ORAL at 20:23

## 2021-09-29 RX ADMIN — SODIUM BICARBONATE 325 MG: 325 TABLET ORAL at 01:37

## 2021-09-29 RX ADMIN — ALBUMIN HUMAN 250 ML: 0.05 INJECTION, SOLUTION INTRAVENOUS at 11:45

## 2021-09-29 RX ADMIN — SEVELAMER CARBONATE 1.6 G: 800 POWDER, FOR SUSPENSION ORAL at 08:43

## 2021-09-29 RX ADMIN — ACETAMINOPHEN 325 MG: 325 SOLUTION ORAL at 19:00

## 2021-09-29 RX ADMIN — Medication 6 MG: at 23:43

## 2021-09-29 RX ADMIN — PROCHLORPERAZINE EDISYLATE 5 MG: 5 INJECTION INTRAMUSCULAR; INTRAVENOUS at 14:16

## 2021-09-29 RX ADMIN — PANCRELIPASE 2 CAPSULE: 24000; 76000; 120000 CAPSULE, DELAYED RELEASE PELLETS ORAL at 05:13

## 2021-09-29 RX ADMIN — SODIUM BICARBONATE 325 MG: 325 TABLET ORAL at 14:18

## 2021-09-29 RX ADMIN — OXYCODONE HYDROCHLORIDE 5 MG: 5 SOLUTION ORAL at 23:43

## 2021-09-29 RX ADMIN — OXYCODONE HYDROCHLORIDE 5 MG: 5 SOLUTION ORAL at 22:36

## 2021-09-29 RX ADMIN — OXYCODONE HYDROCHLORIDE 5 MG: 5 SOLUTION ORAL at 19:00

## 2021-09-29 RX ADMIN — HYDROMORPHONE HYDROCHLORIDE 1 MG: 1 SOLUTION ORAL at 05:13

## 2021-09-29 RX ADMIN — ONDANSETRON 4 MG: 2 INJECTION INTRAMUSCULAR; INTRAVENOUS at 10:37

## 2021-09-29 RX ADMIN — Medication 6 MG: at 00:01

## 2021-09-29 RX ADMIN — SODIUM BICARBONATE 325 MG: 325 TABLET ORAL at 08:43

## 2021-09-29 RX ADMIN — Medication 5 ML: at 09:03

## 2021-09-29 RX ADMIN — Medication 1 PACKET: at 20:24

## 2021-09-29 RX ADMIN — FOLIC ACID 1 MG: 1 TABLET ORAL at 08:44

## 2021-09-29 RX ADMIN — PANTOPRAZOLE SODIUM 40 MG: 40 INJECTION, POWDER, FOR SOLUTION INTRAVENOUS at 08:44

## 2021-09-29 RX ADMIN — ACETAMINOPHEN 325 MG: 325 SOLUTION ORAL at 03:13

## 2021-09-29 RX ADMIN — OXYCODONE HYDROCHLORIDE 5 MG: 5 SOLUTION ORAL at 16:08

## 2021-09-29 RX ADMIN — PANCRELIPASE 2 CAPSULE: 24000; 76000; 120000 CAPSULE, DELAYED RELEASE PELLETS ORAL at 01:37

## 2021-09-29 RX ADMIN — PANCRELIPASE 2 CAPSULE: 24000; 76000; 120000 CAPSULE, DELAYED RELEASE PELLETS ORAL at 16:08

## 2021-09-29 RX ADMIN — HYDROMORPHONE HYDROCHLORIDE 1 MG: 1 SOLUTION ORAL at 00:01

## 2021-09-29 RX ADMIN — HYDROMORPHONE HYDROCHLORIDE 1 MG: 1 SOLUTION ORAL at 08:42

## 2021-09-29 RX ADMIN — HYDROMORPHONE HYDROCHLORIDE 1 MG: 1 SOLUTION ORAL at 14:24

## 2021-09-29 RX ADMIN — PANCRELIPASE 2 CAPSULE: 24000; 76000; 120000 CAPSULE, DELAYED RELEASE PELLETS ORAL at 14:18

## 2021-09-29 RX ADMIN — POTASSIUM CHLORIDE 40 MEQ: 1.5 POWDER, FOR SOLUTION ORAL at 09:03

## 2021-09-29 RX ADMIN — MIDODRINE HYDROCHLORIDE 10 MG: 5 TABLET ORAL at 14:24

## 2021-09-29 RX ADMIN — EPOETIN ALFA-EPBX 5000 UNITS: 10000 INJECTION, SOLUTION INTRAVENOUS; SUBCUTANEOUS at 11:46

## 2021-09-29 RX ADMIN — SEVELAMER CARBONATE 1.6 G: 800 POWDER, FOR SUSPENSION ORAL at 16:08

## 2021-09-29 RX ADMIN — OXYCODONE HYDROCHLORIDE 5 MG: 5 SOLUTION ORAL at 04:03

## 2021-09-29 RX ADMIN — HYDROXYZINE HYDROCHLORIDE 50 MG: 25 TABLET, FILM COATED ORAL at 08:54

## 2021-09-29 RX ADMIN — ONDANSETRON 4 MG: 2 INJECTION INTRAMUSCULAR; INTRAVENOUS at 20:23

## 2021-09-29 ASSESSMENT — ACTIVITIES OF DAILY LIVING (ADL)
ADLS_ACUITY_SCORE: 18
ADLS_ACUITY_SCORE: 14
ADLS_ACUITY_SCORE: 18
ADLS_ACUITY_SCORE: 14
ADLS_ACUITY_SCORE: 18

## 2021-09-29 ASSESSMENT — MIFFLIN-ST. JEOR: SCORE: 1507.75

## 2021-09-29 NOTE — PLAN OF CARE
"Cares 0700 to 1500    BP (!) 92/38 (BP Location: Left arm)   Pulse 95   Temp 97.1  F (36.2  C) (Oral)   Resp 16   Ht 1.676 m (5' 6\")   Wt 64.1 kg (141 lb 5 oz)   SpO2 95%   BMI 22.81 kg/m      VSS ex hypotensive, provider aware. Denied SOB, on 4L NC. Pt ate 1/2 cup of oranges & drank 4 oz of mountain dew. Emesis 2x this shift after eating, provider notified. Zofran & compazine IV given for nausea with relief. No BM's. Endorsed pain in abdomen, meds given via J tube per MAR. TFs at 45 ml/hr, G tube to gravity. Pt went to dialysis today, no fluids were removed. Abdominal dressing changed on fistula & PEG tube. Continue to monitor pt.   "

## 2021-09-29 NOTE — PROGRESS NOTES
HEMODIALYSIS TREATMENT NOTE    Date: 9/29/2021  Time: 3:14 PM    Data:  Pre Wt: 64.1kg  Desired Wt: 64.1 kg   Post Wt: 64.1kg  Weight change:0 kg  Ultrafiltration - Post Run Net Total Removed (mL): 0 mL  Vascular Access Status: patent  Dialyzer Rinse: Clear  Total Blood Volume Processed: 41.84 L Liters  Total Dialysis (Treatment) Time: 3hours  Lab:   no    Interventions/Assessment:  HD via cvc, 3k 2.25ca bath, 3hour run, 0kg net removed. Patient provided zofran for nausea with onset of HD run. Patient given pain medication in tube feed for 7/10 pain, pain improved during run, HD run was unremarkable. cvc locked with saline post run. Hand off report given to PCN.      Plan:    As per renal

## 2021-09-29 NOTE — PLAN OF CARE
RN assumed cares at [phone]     Temp: 97.5  F (36.4  C) Temp src: Oral BP: (!) 96/39 Pulse: 87   Resp: 16 SpO2: 96 % O2 Device: Nasal cannula Oxygen Delivery: 4 LPM    Neuro: A&O x4. Able to make needs known.   Cardiac: Soft BP, MD aware. Otherwise WDL.  Respiratory: LS clear bilaterally, denies SOB.  GI/: HD pt, run scheduled for tomorrow AM. Pt reports intermittent nausea, scheduled zofran administered with some relief. Pt eager and ready to advance to renal diet, food ordered. G tube drainage by gravity, J tube infusing TF at 45 mL/hr, pt tolerating well. No BM this shift.   Skin: Abdominal dressing changes completed, purulent drainage noted under old GJ site. Pt refusing to reposition.   IV/Drains: L triple lumen PICC, heparin locked.   Activity: Assist of 1 in bed, two if pt requests lift.   Nutrition: Pt advanced from clear liquid diet to renal diet this PM.   Pain: Pt reports 7-8/10 abdominal pain partially relieved by prn dilaudid & scheduled oxycodone.      Plan of Care: Continue to encourage repositioning as pt will allow. Monitor N/V in upcoming shifts as diet advances. Continue to update team with soft blood pressures & report any changes in condition.

## 2021-09-29 NOTE — PROGRESS NOTES
[facility]  Palliative Care Daily Progress Note       Recommendations & Counseling       Pain control: Dax complains of back and abdomen pain as well as foot numbness and cramping. He feels he needs pain medications to help him move more freely in bed without pain and he believes that he will not have a problems tapering off the pain medications once his abdominal pain and issues have resolved. We agreed to switch all pain medication to oxycodone and discontinue the hydromorphone and that in the coming days we would discuss a slow taper off the oxycodone.     Continue oxycodone 5mg q8hrs scheduled    Discontinue hydromorphone    Start oxycodone 2.5mg-5mg po q3hrs PRN     Will reassess use over next few days and then begin a taper.     Goals of care: Will plan to have goals of care conversations with Dax in the coming days as continues to be more medically stable.        Thank you for the opportunity to continue to participate in the care of this patient and family.  Please feel free to contact on-call palliative provider with any emergent needs.  We can be reached via team pager [phone] (answered 8-4:30 Monday-Friday); after-hours answering service ([phone]);     This note was initiated by Jason Mejía MS3, medical student. I personally overwrote and edited each section of this note to reflect my sole personal and direct involvement in the patient's care today.    I, Valentina Varela, was present with the medical student who participated in the service and in the documentation of the note.  I have verified the history and personally performed the physical exam and medical decision making.  I agree with the assessment and plan of care as documented in the note and have edited the note to reflect my medical decision making.  I personally reviewed vital signs, medications, labs and imaging.     Thank you for the opportunity to continue to participate in the  care of this patient and family.  Please feel free to contact on-call palliative provider with any emergent needs.  We can be reached via team pager [phone] (answered 8-4:30 Monday-Friday); after-hours answering service ([phone]);   Physician Attestation:   Total time spent was 35 minutes spent in reviewing record, patient examination and counseling, discussion with primary team and documentation. >50% of time was spent counseling and/or coordination of care regarding symptom management, plan of care.  Valentina Varela MD / Palliative Medicine / Pager [phone] / [facility] Inpatient Team Consult Pager [phone] (answered 8am-430pm M-F) - ok to text page via Nam[employer] / After-Hours Answering Service [phone] / Palliative Clinic in the [facility] at the [facility] - [phone] (scheduling); [phone] (triage).       Assessments            Dax Villareal is a 30 year old man with history of alcohol dependence with a prolonged and complex hospitalization for acute alcoholic hepatitis and associated liver failure that has been complicated by RADHA requiring HD, hepatic encephalopathy, SBP causing septic shock, infected necrotizing pancreatitis requiring drainage and multiple necrosectomies, malnutrition requiring PEG-J leading to fistula s/p endoscopic suture gastroplexy, with continue difficulty with pain management and palliative medicine re-consulted to assist with pain management, nausea and goals of care.      Today, the patient was seen for:  acute alcoholic hepatitis and associated liver failure  RADHA requiring HD  Malnutrition  Complex pain   Nausea        Prognosis, Goals, or Advance Care Planning was addressed today with: No.    Mood, coping, and/or meaning in the context of serious illness were addressed today: No.              Interval History:     Chart review/discussion with unit or clinical team members:   Starting to readdress Dax's goal of care in the context that he now has to be  "on dialysis and is dealing with many chronic issues. Working on figuring out where he wants to be with his recovery and when he feels like he will be ready to leave the hospital.    Per patient or family/caregivers today:  Dax says that his pain is worse after PCA device was removed. The pain is in his back and abdomen as well as foot numbness and cramping. He feels he needs pain medications to help him move more freely in bed without pain and he believes that he will not have a problems tapering off the pain medications once his abdominal pain and issues have resolved. For now, he is willing to try switching his pain control medication completely to oxycodone, which he says works better than hydromorphone.    Key Palliative Symptoms:   Pain location: back, abdomen, foot  # Pain severity the last 12 hours: moderate  # Dyspnea severity the last 12 hours: none  # Nausea severity the last 12 hours: none    Patient is on opioids: assessed and bowels ok/no needed changes to plan of care today.           Review of Systems:     Besides above, a complete 10+ ROS was reviewed and is unremarkable          Medications:     I have reviewed this patient's medication profile and medications during this hospitalization.    Noted meds:    Oxycodone 5mg TID   Oxycodone 2.5mg-5mg po q3hrs PRN (for break through pain)  zofran 4mg TID  protonix 40mg IV every day  Atarax 25-50mg TID PRN   Compazine 5mg IV q6hrs PRN   Tylenol PRN   Flexeril PRN              Physical Exam:   Vitals were reviewed  BP (!) 92/38 (BP Location: Left arm)   Pulse 95   Temp 97.1  F (36.2  C) (Oral)   Resp 16   Ht 1.676 m (5' 6\")   Wt 64.1 kg (141 lb 5 oz)   SpO2 95%   BMI 22.81 kg/m      Gen: alert, appears chronically ill, in NAD  Eyes: Conjunctiva clear. Sclera anicteric.  HENT: NCAT; mucous membranes slightly dry  Resp: no increased work of breathing, speaking full sentences  Abd: soft, slightly distended, +BS  Msk: no gross deformity  Skin:  trace " jaundice, warm and well perfursed  Neuro: A&O x 3; CN II-XII grossly intact;  Mental status/Psych:able to engage in conversation; sensorium intact             Data Reviewed:     Reviewed recent labs and pertinent imaging

## 2021-09-29 NOTE — PROVIDER NOTIFICATION
Provider notified (name): Daniela Rm 3  Reason for notification: FYI: 2 emesis today, >100 ml. BP's still soft.     Response from provider:

## 2021-09-29 NOTE — PLAN OF CARE
"Care assumed: [id number]   Vitals  Pain   BP (!) 99/35 (BP Location: Left arm)   Pulse 89   Temp (!) 96.2  F (35.7  C) (Oral)   Resp 19   Ht 1.676 m (5' 6\")   Wt 64.1 kg (141 lb 5 oz)   SpO2 95% 4LPM   BMI 22.81 kg/m           Dilaudid available for pain in abdomen   Scheduled oxy    Activity  Lift    Neuro/Mood  A&Ox 4      Denies numbness and tingling    Cardiac  No signs of acute distress    Respiratory  No signs of acute respiratory distress    GI/  PT on Hemodialysis .  Incontinent to bowel       Diet/ Nutrition  Renal diet  1500mL fluid restriction       Skin, Wounds, LDAs REJI skin; Pt refuses both assessment and repositioning   PICC triple lumen   CVC for dialysis   Peg tube; G to gravity drain J for feeding.   Tube feeds at 45mL/hr goal    Plan of Care  Other notes Follow POC  Dressing change for old peg tube site saturated. Asked Pt when he would like it changed. Pt states,\" I'll think about it\". Will let day nurse know.     Dialysis at noon today.          "

## 2021-09-29 NOTE — PROGRESS NOTES
[facility]    Progress Note - Daniela 3 Service  Date of Admission:  6/28/2021    Changes today:  - Dialysis today  - Palliative care will see to begin opioid taper  - Per nephrology, narcotics may be contributing to hypotension. Don't recommend any fluids to compensate for gastric losses apart from PO and tube feeds. Am cortisol and iCa within normal limits.  - 40mEq K  - Will check BMP 1700 and am  - per dentistry, could go to [facility] dental clinic when stable for transfer to begin dental work    Assessment & Plan              Dax Villareal is a 30 year old man with hx of alcohol use disorder admitted on 6/28/2021 after recent prolonged admission at [facility] in [place] who has severe acute alcoholic hepatitis c/b HE, ESRD requiring HD, and malnutrition in the setting of acute necrotizing pancreatitis, s/p PEG-J placement on 7/13. He arrived with acute respiratory failure and septic shock that have resolved, and his secondary bacterial peritonitis has been appropriately treated. He requires continued management for ESRD, hepatic encephalopathy, infected pancreatic pseudocyst, nutritional support via PEG-J, and persistent right pleural effusion. Clinically improving after multiple necrosectomies. Increasing leukocytosis and hypotension on 9/2 prompting resumption of IV antibiotics - has been stable since that time. Repeat CT with interval improvement in fluid collections.    Necrotizing alcoholic pancreatitis w/ infected pseudocyst, improving  Hepatic Fluid collection, stable to improving  Secondary Bacterial Peritonitis 2/2 infected pseudocyst with VRE, improving  Septic Shock, resolved  Admitted to the [facility] ICU on 6/28/2021 from [facility] in [place] for septic shock due to necrotizing pancreatitis. CT on admit showing mature collection with enhancing wall measuring ~[id number]. IR placed perc drain 6/29. Repeat imaging 7/18 revealed walled off necrotic  collection amenable to endoscopic transluminal drainage with cystgastrostomy stenting with necrosectomy on 7/21/21. Complete necrosectomy on 8/11. Antibiotics (cefepime and metronidazole) were discontinued on 8/25 after left flank drain removed, re-initiated in the setting of fever on 9/2. Favor abdominal etiology, repeat CT on 9/7 with interval improvement in fluid collections - completed 7d course of abx per ID with EOT 9/8.  - Will likely need intermittent antibiotics with fluid collections occasionally with bacterial growth moving forward as to be expected in nec panc  - Pain - scheduled oxycodone q8H and IV dilaudid for dressing changes  - Palliative care following for pain management and goals of care given overall illness    G-tube Fistula  Dax had been having increasing G-tube output and was found to have an erythematous indurated area next to his G-tube insertion. This was found to be his G-tube balloon pushing through the skin. He underwent G-tube replacement on  9/13/21. His old G-tube site has since had large volume output requiring frequent wound care dressing changes.  - 9/20/21 - EGD to close fistula completed by GI  - Wound team consulted, follow their wound care recs  - Advanced diet 9/27, g-tube to gravity (clamped while eating)    Periapical abscess: L 3rd maxillary molar  Dental Caries  - Dentistry consulted, and discussed with oral surgery  - Amoxicillin for 7d course (9/21-9/27)  - Dental extraction when stable to be transported (while inpatient) to [facility]    Hypovolemia  Patient with substantial daily NG output and poor PO. Also note significant weight loss over the past few weeks. He has needed intermittent boluses of normal saline for euvolemia. Have discussed with renal multiple times - please re-evaluate volume status vs dry weight as he is often hypotensive following fluid removal.  - Weight 78kg on 8/13. Then 55 kg, weight 68.5kg on 9/22. Concern for weight loss  vs. targeting dry weight.  - Monitor vitals and output post dialysis, consider fluids prn.  - cont midodrine 10 TID with Nephrology    Gastric Outlet Obstruction  High G tube output due to the cystogastric connection/fluid drainage noted on 8/5. Per GI, possibly related to gastric outlet from severe abdominal inflammation and is expected. Continues to have mild to moderate NG output - stable to improved. Relieved with draining G tube to gravity when experiencing pain/nausea.    Hyponatremia  Patient with down-trending sodium the week of 8/27; likely due to substantial NG output and low solute intake and hepatic dysfunction. Stable 127-128. Then week of 9/27 hyponatremia (120), nephrology following.    Right Pancreatic Hydrothorax, stable  Increased dyspnea with CXR on 7/19 with further imaging and thoracentesis confirming a hepatic/pancreatic hydrothorax. Thoracentesis PRN for dyspnea, most recently 9/3.    ESRD due to ATN  Hyperphosphatemia  Stage III RADHA due to ATN from septic shock without recovery for >3 months. Line is tunneled dialysis catheter from [facility]. Nephrology dialyzed on Tu, Th, Sa schedule.  - Will need follow-up and outpatient dialysis  - Sevelemer powder TID with TF for hyperphosphatemia  - Discuss if needs dialysis line replacement     Anemia of chronic illness  Hematochezia, resolved  Hemoperitoneum  Baseline Hgb 17. Running between 6-8 in setting of chronic illness, new ESRD with epocrit per Nephrology, frequent lab draws and procedures. Hematochezia on 8/8 after starting standard intensity heparin, now resolved. Paracentesis on 8/21 with hemoperitoneum. No active bleeding. Required a unit of PRBC on 9/4 in the setting of several fluid boluses.   - Goal hgb > 7.0, trend Hgb     Alcohol Use Disorder  Counseled patient on complete alcohol cessation. Not interested in additional services at this time. Continue daily folic acid and thiamine.  - Ensure patient has resources for treatment at discharge if  managing alcohol cessation independently becomes challenging.    Severe malnutrition of chronic illness  Poor oral intake in the setting of necrotizing pancreatitis. PEG tube placed 7/13, replaced 9/14. Continuous tube feeds with goal of 65 ml/hr with continuous pancreatic enzymes. Transitioning to renal formula.   - Continuous tube feeds  - Advancing diet as tolerated to dialysis diet     Diet: Advance diet as tolerated  DVT Prophylaxis: SCDs  Rdz Catheter: Not present  Central Lines: PRESENT  PICC Triple Lumen 06/28/21 Left-Site Assessment: WDL  CVC Double Lumen Right-Site Assessment: WDL    Code Status: Full Code      Disposition Plan   Expected discharge: 7-8 days  recommended to long term care once adequate pain management/ tolerating PO medications and safe disposition plan/ TCU bed available.     The patient's care was discussed with the Attending Physician, Dr. Tabares.    Marleny Monk MD  PGY-1, Internal Medicine-Pediatrics  ______________________________________________________________________    Interval History   Patient reports continued fatigue. No new symptoms. Would like to sleep.    Data reviewed today: I reviewed all medications, new labs and imaging results over the last 24 hours. I personally reviewed no images or EKG's today.    Physical Exam   Vital Signs: Temp: (!) 96.2  F (35.7  C) Temp src: Oral BP: (!) 99/35 Pulse: 89   Resp: 19 SpO2: 95 % O2 Device: Nasal cannula Oxygen Delivery: 4 LPM  Weight: 141 lbs 5.04 oz  General Appearance: Chronically ill appearing, in no acute distress  Respiratory: normal work of breathing on ambient air  Cardiovascular: regular rate  GI: soft ND, tender to palpation around Gtube site, declined examination under the dressing  Skin: erythema around procedures site  Other: Alert, conversant, fatigued

## 2021-09-29 NOTE — PROVIDER NOTIFICATION
Provider notified (name):  JOE STEINER JR  Reason for notification:  5B 36-1 KR. Just FYI BP is 87/31 MAP 53. Thanks. Sebas CHENG RN [phone] ext [phone]   Recommendation/request given to provider: Just fyi   Response from provider:

## 2021-09-29 NOTE — CONSULTS
[facility] Nurse Inpatient Wound Assessment   Reason for consultation: Evaluate and treat Old GJ site wounds  9/22- New consult for future skin breakdown on current G-tube site  Assessment  abdominal wounds due to Surgical Wound-   Status: evolving, follow up  G-tube site- stable- no open area noted  Treatment Plan    Old GI tube site - Daily and PRN    Cleanse the area with NS and pat dry.    Apply No sting film barrier to periwound skin.    Cover wound with Mepilex.    Change dressing daily per GI team    G- tube site- Instill 10-20 ml of NS into the current gauze before removal. Let it sit for about 10 mins to soak the dressing and easy removal to minimize pain.  Gently remove the dressing and cleanse with NS, then pat dry.  Paint with no sting film barrier to protect skin, apply two layers of barrier.  Place one piece of fenestrated gauze.  Secure the tube.    Orders updated and spoke with RN   Recommended provider order: recommend a liquid creatinine to check to see if that is urine or not.   Paged out attending and nephrology team.   [facility] Nurse follow-up plan:weekly  Nursing to notify the Provider(s) and re-consult the [facility] Nurse if wound(s) deteriorates or new skin concern.    Patient History  According to provider note(s):  Dax Villareal is a 30 year old male with PMHx significant for alcohol use disorder who has had a prolonged hospitalization at [facility] in [place] for acute alcohol hepatitis/end stage liver disease complicated by hepatic encephalopathy, acute renal failure requiring hemodialysis, acute hypoxemic respiratory failure and septic shock secondary to acute necrotizing pancreatitis. Transferred to [facility] ICU on 6/28/2021 for further management of necrotizing pancreatitis by interventional GI, possible necrosectomy.    Objective Data  Containment of urine/stool: Incontinence Protocol    Active Diet Order  Orders Placed This Encounter      Dialysis Diet      Output:   I/O last 3 completed  shifts:  In: 770 [P.O.:240; I.V.:30; NG/GT:365]  Out: 1100 [Emesis/NG output:1100]    Risk Assessment:   Sensory Perception: 3-->slightly limited  Moisture: 3-->occasionally moist  Activity: 1-->bedfast  Mobility: 2-->very limited  Nutrition: 3-->adequate  Friction and Shear: 1-->problem  Tyrel Score: 13                          Labs:   Recent Labs   Lab 09/28/21  1005 09/26/21  0705 09/26/21  0705   ALBUMIN 2.5*  --   --    HGB 8.7*   < > 9.3*   WBC 8.4   < > 11.2*   CRP  --   --  47.0*    < > = values in this interval not displayed.       Physical Exam  Areas of skin assessed: focused old G-tube site     Wound Location:  Right upper quadrant  Date of last photo none taken- pt declined.  Wound History: New G-tube site- No drainage noted. Blanchable erythema with minimal dry drainage.    Old G-tube closure site- 3x4x0.1 cm with minimal fibrinous vs slough tissue, small drainage on old dressing. Very tender to touch.       Interventions  Visual inspection and assessment completed   Wound Care Rationale Protect periwound skin, Provide protection  and Pain reduction  Wound Care: completed by RN  Supplies: floor stock and discussed with RN  Current off-loading measures: Pillows under calves and Pillows  Current support surface: Standard  Atmos Air mattress  Education provided to: plan of care encouraged RN to connect to ramos bag to avoid the pouch overfilling. s he said she is going to.  Discussed plan of care with Patient and Nurse    Deneen Matthews RN CWOCN

## 2021-09-30 LAB
ANION GAP SERPL CALCULATED.3IONS-SCNC: 11 MMOL/L (ref 3–14)
ANION GAP SERPL CALCULATED.3IONS-SCNC: 9 MMOL/L (ref 3–14)
BUN SERPL-MCNC: 34 MG/DL (ref 7–30)
BUN SERPL-MCNC: 42 MG/DL (ref 7–30)
CALCIUM SERPL-MCNC: 9.5 MG/DL (ref 8.5–10.1)
CALCIUM SERPL-MCNC: 9.6 MG/DL (ref 8.5–10.1)
CHLORIDE BLD-SCNC: 92 MMOL/L (ref 94–109)
CHLORIDE BLD-SCNC: 93 MMOL/L (ref 94–109)
CO2 SERPL-SCNC: 25 MMOL/L (ref 20–32)
CO2 SERPL-SCNC: 25 MMOL/L (ref 20–32)
CREAT SERPL-MCNC: 3.13 MG/DL (ref 0.66–1.25)
CREAT SERPL-MCNC: 3.58 MG/DL (ref 0.66–1.25)
ERYTHROCYTE [DISTWIDTH] IN BLOOD BY AUTOMATED COUNT: 15.9 % (ref 10–15)
GFR SERPL CREATININE-BSD FRML MDRD: 21 ML/MIN/1.73M2
GFR SERPL CREATININE-BSD FRML MDRD: 25 ML/MIN/1.73M2
GLUCOSE BLD-MCNC: 114 MG/DL (ref 70–99)
GLUCOSE BLD-MCNC: 94 MG/DL (ref 70–99)
HCT VFR BLD AUTO: 28.3 % (ref 40–53)
HGB BLD-MCNC: 8.8 G/DL (ref 13.3–17.7)
MAGNESIUM SERPL-MCNC: 1.9 MG/DL (ref 1.6–2.3)
MAGNESIUM SERPL-MCNC: 1.9 MG/DL (ref 1.6–2.3)
MCH RBC QN AUTO: 30.3 PG (ref 26.5–33)
MCHC RBC AUTO-ENTMCNC: 31.1 G/DL (ref 31.5–36.5)
MCV RBC AUTO: 98 FL (ref 78–100)
OSMOLALITY SERPL: 282 MMOL/KG (ref 275–295)
PHOSPHATE SERPL-MCNC: 4.3 MG/DL (ref 2.5–4.5)
PLATELET # BLD AUTO: 259 10E3/UL (ref 150–450)
POTASSIUM BLD-SCNC: 2.9 MMOL/L (ref 3.4–5.3)
POTASSIUM BLD-SCNC: 4.1 MMOL/L (ref 3.4–5.3)
RBC # BLD AUTO: 2.9 10E6/UL (ref 4.4–5.9)
SODIUM SERPL-SCNC: 127 MMOL/L (ref 133–144)
SODIUM SERPL-SCNC: 128 MMOL/L (ref 133–144)
WBC # BLD AUTO: 7.8 10E3/UL (ref 4–11)

## 2021-09-30 PROCEDURE — 250N000013 HC RX MED GY IP 250 OP 250 PS 637: Performed by: INTERNAL MEDICINE

## 2021-09-30 PROCEDURE — 36592 COLLECT BLOOD FROM PICC: CPT

## 2021-09-30 PROCEDURE — 99207 PR NON-BILLABLE SERV PER CHARTING: CPT

## 2021-09-30 PROCEDURE — 85014 HEMATOCRIT: CPT

## 2021-09-30 PROCEDURE — 99233 SBSQ HOSP IP/OBS HIGH 50: CPT | Mod: GC | Performed by: STUDENT IN AN ORGANIZED HEALTH CARE EDUCATION/TRAINING PROGRAM

## 2021-09-30 PROCEDURE — 250N000011 HC RX IP 250 OP 636: Performed by: INTERNAL MEDICINE

## 2021-09-30 PROCEDURE — 250N000013 HC RX MED GY IP 250 OP 250 PS 637

## 2021-09-30 PROCEDURE — 83930 ASSAY OF BLOOD OSMOLALITY: CPT | Performed by: INTERNAL MEDICINE

## 2021-09-30 PROCEDURE — 83735 ASSAY OF MAGNESIUM: CPT | Performed by: STUDENT IN AN ORGANIZED HEALTH CARE EDUCATION/TRAINING PROGRAM

## 2021-09-30 PROCEDURE — 36592 COLLECT BLOOD FROM PICC: CPT | Performed by: STUDENT IN AN ORGANIZED HEALTH CARE EDUCATION/TRAINING PROGRAM

## 2021-09-30 PROCEDURE — C9113 INJ PANTOPRAZOLE SODIUM, VIA: HCPCS | Performed by: INTERNAL MEDICINE

## 2021-09-30 PROCEDURE — 80048 BASIC METABOLIC PNL TOTAL CA: CPT

## 2021-09-30 PROCEDURE — 120N000002 HC R&B MED SURG/OB UMMC

## 2021-09-30 PROCEDURE — 83735 ASSAY OF MAGNESIUM: CPT | Performed by: INTERNAL MEDICINE

## 2021-09-30 PROCEDURE — 250N000013 HC RX MED GY IP 250 OP 250 PS 637: Performed by: STUDENT IN AN ORGANIZED HEALTH CARE EDUCATION/TRAINING PROGRAM

## 2021-09-30 PROCEDURE — 80048 BASIC METABOLIC PNL TOTAL CA: CPT | Performed by: STUDENT IN AN ORGANIZED HEALTH CARE EDUCATION/TRAINING PROGRAM

## 2021-09-30 PROCEDURE — 250N000011 HC RX IP 250 OP 636: Performed by: STUDENT IN AN ORGANIZED HEALTH CARE EDUCATION/TRAINING PROGRAM

## 2021-09-30 PROCEDURE — 84100 ASSAY OF PHOSPHORUS: CPT

## 2021-09-30 PROCEDURE — 250N000011 HC RX IP 250 OP 636: Performed by: HOSPITALIST

## 2021-09-30 RX ORDER — POTASSIUM CHLORIDE 1.5 G/1.58G
60 POWDER, FOR SOLUTION ORAL ONCE
Status: COMPLETED | OUTPATIENT
Start: 2021-09-30 | End: 2021-09-30

## 2021-09-30 RX ORDER — POTASSIUM CHLORIDE 7.45 MG/ML
10 INJECTION INTRAVENOUS
Status: COMPLETED | OUTPATIENT
Start: 2021-09-30 | End: 2021-09-30

## 2021-09-30 RX ADMIN — MIDODRINE HYDROCHLORIDE 10 MG: 5 TABLET ORAL at 13:21

## 2021-09-30 RX ADMIN — OXYCODONE HYDROCHLORIDE 5 MG: 5 SOLUTION ORAL at 05:16

## 2021-09-30 RX ADMIN — ONDANSETRON 4 MG: 2 INJECTION INTRAMUSCULAR; INTRAVENOUS at 13:20

## 2021-09-30 RX ADMIN — OXYCODONE HYDROCHLORIDE 5 MG: 5 SOLUTION ORAL at 22:22

## 2021-09-30 RX ADMIN — ONDANSETRON 4 MG: 2 INJECTION INTRAMUSCULAR; INTRAVENOUS at 09:36

## 2021-09-30 RX ADMIN — PANTOPRAZOLE SODIUM 40 MG: 40 INJECTION, POWDER, FOR SOLUTION INTRAVENOUS at 09:36

## 2021-09-30 RX ADMIN — Medication 15 ML: at 15:53

## 2021-09-30 RX ADMIN — SODIUM BICARBONATE 325 MG: 325 TABLET ORAL at 20:35

## 2021-09-30 RX ADMIN — OXYCODONE HYDROCHLORIDE 5 MG: 5 SOLUTION ORAL at 15:53

## 2021-09-30 RX ADMIN — METHYLPHENIDATE HYDROCHLORIDE 5 MG: 5 TABLET ORAL at 13:21

## 2021-09-30 RX ADMIN — PANCRELIPASE 2 CAPSULE: 24000; 76000; 120000 CAPSULE, DELAYED RELEASE PELLETS ORAL at 15:52

## 2021-09-30 RX ADMIN — OXYCODONE HYDROCHLORIDE 5 MG: 5 SOLUTION ORAL at 18:40

## 2021-09-30 RX ADMIN — OXYCODONE HYDROCHLORIDE 5 MG: 5 SOLUTION ORAL at 09:43

## 2021-09-30 RX ADMIN — PANCRELIPASE 2 CAPSULE: 24000; 76000; 120000 CAPSULE, DELAYED RELEASE PELLETS ORAL at 04:22

## 2021-09-30 RX ADMIN — PANCRELIPASE 2 CAPSULE: 24000; 76000; 120000 CAPSULE, DELAYED RELEASE PELLETS ORAL at 09:48

## 2021-09-30 RX ADMIN — POTASSIUM CHLORIDE 60 MEQ: 1.5 POWDER, FOR SOLUTION ORAL at 13:20

## 2021-09-30 RX ADMIN — Medication 1 PACKET: at 20:35

## 2021-09-30 RX ADMIN — PANCRELIPASE 2 CAPSULE: 24000; 76000; 120000 CAPSULE, DELAYED RELEASE PELLETS ORAL at 20:33

## 2021-09-30 RX ADMIN — OXYCODONE HYDROCHLORIDE 5 MG: 5 SOLUTION ORAL at 02:12

## 2021-09-30 RX ADMIN — ACETAMINOPHEN 325 MG: 325 SOLUTION ORAL at 18:40

## 2021-09-30 RX ADMIN — PANCRELIPASE 2 CAPSULE: 24000; 76000; 120000 CAPSULE, DELAYED RELEASE PELLETS ORAL at 13:35

## 2021-09-30 RX ADMIN — SODIUM BICARBONATE 325 MG: 325 TABLET ORAL at 00:59

## 2021-09-30 RX ADMIN — SODIUM BICARBONATE 325 MG: 325 TABLET ORAL at 13:28

## 2021-09-30 RX ADMIN — SODIUM BICARBONATE 325 MG: 325 TABLET ORAL at 09:48

## 2021-09-30 RX ADMIN — HYDROXYZINE HYDROCHLORIDE 50 MG: 25 TABLET, FILM COATED ORAL at 20:42

## 2021-09-30 RX ADMIN — OXYCODONE HYDROCHLORIDE 5 MG: 5 SOLUTION ORAL at 13:21

## 2021-09-30 RX ADMIN — SEVELAMER CARBONATE 1.6 G: 800 POWDER, FOR SUSPENSION ORAL at 15:53

## 2021-09-30 RX ADMIN — POTASSIUM CHLORIDE 10 MEQ: 7.46 INJECTION, SOLUTION INTRAVENOUS at 14:30

## 2021-09-30 RX ADMIN — POTASSIUM CHLORIDE 10 MEQ: 7.46 INJECTION, SOLUTION INTRAVENOUS at 13:26

## 2021-09-30 RX ADMIN — SEVELAMER CARBONATE 1.6 G: 800 POWDER, FOR SUSPENSION ORAL at 20:34

## 2021-09-30 RX ADMIN — PANCRELIPASE 2 CAPSULE: 24000; 76000; 120000 CAPSULE, DELAYED RELEASE PELLETS ORAL at 00:59

## 2021-09-30 RX ADMIN — CYCLOBENZAPRINE 10 MG: 10 TABLET, FILM COATED ORAL at 20:42

## 2021-09-30 RX ADMIN — SODIUM BICARBONATE 325 MG: 325 TABLET ORAL at 04:22

## 2021-09-30 RX ADMIN — SODIUM BICARBONATE 325 MG: 325 TABLET ORAL at 15:52

## 2021-09-30 RX ADMIN — PROCHLORPERAZINE EDISYLATE 5 MG: 5 INJECTION INTRAMUSCULAR; INTRAVENOUS at 16:03

## 2021-09-30 RX ADMIN — ONDANSETRON 4 MG: 2 INJECTION INTRAMUSCULAR; INTRAVENOUS at 20:35

## 2021-09-30 ASSESSMENT — ACTIVITIES OF DAILY LIVING (ADL)
ADLS_ACUITY_SCORE: 18
ADLS_ACUITY_SCORE: 18
ADLS_ACUITY_SCORE: 14
ADLS_ACUITY_SCORE: 18

## 2021-09-30 ASSESSMENT — MIFFLIN-ST. JEOR: SCORE: 1517.75

## 2021-09-30 NOTE — PROVIDER NOTIFICATION
Provider notified (name): Daniela Valencia 3  Reason for notification: Please place standing K+ replacement orders. Thanks.     Response from provider: Nephrology does not want pt to have replacement protocol. Will place K+ lab recheck today.

## 2021-09-30 NOTE — PROVIDER NOTIFICATION
Provider notified (name): Daniela Valencia 3  Reason for notification: 200 ml emesis. Refused AM meds. Please call RN.    Response from provider: Team went to assess pt.

## 2021-09-30 NOTE — PLAN OF CARE
"Care assumed: [id number]   Vitals  Pain   BP (!) 99/31 (BP Location: Right arm)   Pulse 94   Temp (!) 95.6  F (35.3  C) (Axillary)   Resp 16   Ht 1.676 m (5' 6\")   Wt 61 kg (134 lb 7.7 oz)   SpO2 90%  RA  BMI 21.71 kg/m            Scheduled and PRN oxycodone    Activity  Lift    Neuro/Mood  A&Ox 4      Denies numbness and tingling    Cardiac  No signs of acute distress    Respiratory  No signs of acute respiratory distress    GI/  PT on Hemodialysis .  Incontinent to bowel       Diet/ Nutrition  Renal diet  1500mL fluid restriction       Skin, Wounds, LDAs REJI skin; Pt refuses both assessment and repositioning   PICC triple lumen   CVC for dialysis   Peg tube; G to gravity drain J for feeding.   Tube feeds at 45mL/hr goal     Peg tube dressing changed due to leaking. Pt will request another dressing change later today    Plan of Care  Other notes Follow POC  Calls appropriately          "

## 2021-09-30 NOTE — PROVIDER NOTIFICATION
Provider notified (name): Daniela Valencia 3  Reason for notification: BP 85/30. K+ 2.9; no replacement orders. Any interv?    Response from provider:

## 2021-09-30 NOTE — PROGRESS NOTES
Nephrology Progress Note  09/29/2021     Dax Villareal is a 30 year old male with h/o ETOH hepatitis, end stage liver disease, RADHA on HD, transferred from [facility] in [place] for septic shock on 6/28/21 for treatment of necrotizing pancreatitis and decompensated liver disease. He was initially admitted to [facility] 5/19/21.       Assessment & Recommendations:     1 ESRD- Patient has developed ESRD given his unrecovered RADHA.    - Has Right TDC   - Continue MWF schedule   - Primary team working on discharge planning and concerned that patient would need a fluid management plan given frequent hypotension episodes ( almost daily) with ongoing high GI output. We have not been removing fluid during HD this week and his b/ps have been stable in the / range. Any fluid plan once patient discharged to OP HD unit would originate with the OP Nephrologist assigned to this patient. Given that he is anuric and drinks excessive amts of fluid ( ie: chronic hyponatremia), his fluid needs would have to be assessed on a regular basis. Regarding his hypotension we suspect this is related to his Dilaudid use ( getting 1 mg po up to 5 times/day). Could repeat an Echo to make sure he has not developed pericardial effusion given ongoing pleural effusion.      2. Volume status - No dyspnea/edema. On supplemental oxygen.  No Pre run weight today.  Admission weight 80.2 kg. SBP 80-90/ Intake 770 ml+/Output: G- 1100 ml. He is anuric   - UF goal today is no weight off     3. CV - SBP 80-90/ due to large G output. On Midodrine 10 mg TID   - No weight off on HD today     4. ETOH hepatitis/ recurrent ascites, HE - Patient with heavy ETOH use. Last ETOH unknown. TB improved from 20's to 1.4. Off Lactulose/Rifax. Remains on  thiamine, Folate.   - Per GI     5. Electrolytes - Chronic hyponatremia with pre run Na 125, K 4.2.     - He should be on a 1.5 liter/day fluid restriction given that he is anuric on HD      6.  Acid base - No acute  "concerns. Bicarb 22     7. BMD - Ca 8.8, Phos 4.4 ( improved from 10.5), albumin 2.5     Continue Renvela 1.6 g TID for phos binder    8. Antimicrobials - none    9. Anemia - Hgb 8.7   - Iron studies 8/21: Ferritin 2186, Fe 28, IS 21   - He is not iron deficient   - Continue Epo 5000 unit(s) IV on HD. He is likely quite Epo resistant given infection/inflammatory state     Recommendations were communicated to primary team directly    Tory Toledo, NP   [phone]    Interval History :   Nursing and provider notes from last 24 hours reviewed.  No acute renal changes   Seen on HD  Tolerated procedure w/o complication    Review of Systems:   I reviewed the following systems:  Did not awaken for ROS    Physical Exam:   I/O last 3 completed shifts:  In: 515 [P.O.:120; NG/GT:395]  Out: 100 [Emesis/NG output:100]   BP (!) 91/29 (BP Location: Left arm)   Pulse 95   Temp 97.1  F (36.2  C) (Oral)   Resp 16   Ht 1.676 m (5' 6\")   Wt 64.1 kg (141 lb 5 oz)   SpO2 95%   BMI 22.81 kg/m       GENERAL APPEARANCE: Asleep on HD  Pulmonary: CTA on left, right diminished  CV: RRR   - Edema - no LE edema  GI: Non distended. PEG present.   SKIN: no rash, warm, dry, no cyanosis  NEURO: asleep  ACCESS: Right TDC     Labs:   All labs reviewed by me  Electrolytes/Renal -   Recent Labs   Lab Test 09/29/21  1744 09/29/21  0940 09/29/21  0311 09/28/21  1005 09/27/21  0247 09/27/21  0029 09/26/21  1627 09/26/21  0705 09/24/21  2231 09/24/21  0847   * 125*  --  128*   < > 121*   < >  --    < >  --    POTASSIUM 3.4 4.2  --  3.1*   < >  --    < > 3.1*   < >  --    CHLORIDE 97 89*  --  92*   < >  --    < >  --    < >  --    CO2 27 22  --  23   < >  --    < >  --    < >  --    BUN 20 56*  --  37*   < >  --    < >  --    < >  --    CR 2.11* 4.29*  --  3.17*   < >  --    < >  --    < >  --    * 94 113* 130*   < >  --    < >  --    < >  --    JANENE 8.8 10.2*  --  9.5   < >  --    < >  --    < >  --    MAG  --  2.1  --  1.8  --  2.4*  " --  2.3   < > 2.3   PHOS  --   --   --  4.4  --   --   --  6.2*  --  8.5*    < > = values in this interval not displayed.       CBC -   Recent Labs   Lab Test 09/28/21  1005 09/26/21  0705 09/24/21  0847   WBC 8.4 11.2* 9.4   HGB 8.7* 9.3* 9.3*    286 253       LFTs -   Recent Labs   Lab Test 09/28/21  1005 09/04/21  0414 09/03/21  0523 09/02/21  1730 09/02/21  1730   ALKPHOS  --  81 95  --  120   BILITOTAL  --  1.4* 2.0*  --  2.2*   ALT  --  29 31  --  40   AST  --  38 39  --  57*   PROTTOTAL  --  8.8 9.2*  --  10.7*   ALBUMIN 2.5* 2.9* 2.3*   < > 2.7*    < > = values in this interval not displayed.       Iron Panel -   Recent Labs   Lab Test 08/14/21 2055 07/19/21  0632   IRON 28* 31*   IRONSAT 21 43   JERRELL 2,186*  --          Imaging:    XR CHEST PORT 1 VIEW9/28/2021 2:14 PM     INDICATION: hypotension and lactic acidosis, pancreatic hydrothorax,  assess for new signs of infection     COMPARISON:  9/19/2021     FINDINGS: AP view of the chest. Left arm PICC tip near cavoatrial  junction. Right IJ dual-lumen venous catheter tip in the low SVC.  Large right-sided pleural effusion and hazy airspace opacities  throughout the right chest. Cardiac mediastinal silhouette is stable.  Left lung is clear. Bones are stable. Trachea is midline.                                                                      IMPRESSION:   1. Moderate to large right-sided pleural effusion similar to exam  9/19/2021. Right lung opacities, likely atelectasis.  2. Support devices in unchanged position.     I have personally reviewed the examination and initial interpretation  and I agree with the findings.     VIKTOR BRUCE MD      Current Medications:    sodium bicarbonate  325 mg Per Feeding Tube Q4H    And     amylase-lipase-protease  1-2 capsule Per Feeding Tube Q4H     amylase-lipase-protease  2 capsule Per G Tube TID w/meals     B and C vitamin Complex with folic acid  5 mL Per Feeding Tube Daily     fiber modular  (NUTRISOURCE FIBER)  1 packet Per Feeding Tube TID     folic acid  1 mg Oral or Feeding Tube Daily     heparin lock flush  5-20 mL Intracatheter Q24H     melatonin  6 mg Oral or Feeding Tube At Bedtime     menthol   Transdermal Q8H     methylphenidate  5 mg Per Feeding Tube BID     midodrine  10 mg Per Feeding Tube TID     ondansetron  4 mg Oral TID    Or     ondansetron  4 mg Intravenous TID     oxyCODONE  5 mg Oral or Feeding Tube Q8H     pantoprazole (PROTONIX) IV  40 mg Intravenous Daily with breakfast     protein modular  1 packet Per Feeding Tube Daily     sevelamer carbonate (RENVELA)  1.6 g Oral or Feeding Tube TID     sodium chloride (PF)  3 mL Intracatheter Q8H     vitamin B1  100 mg Oral or Feeding Tube Daily    Or     thiamine  100 mg Intravenous Daily       - MEDICATION INSTRUCTIONS -       dextrose       heparin (porcine) 1,000 Units/hr (08/24/21 0907)     Tory Toledo, NP

## 2021-09-30 NOTE — PROGRESS NOTES
RN assumed cares at [id number]    Temp: 98.7  F (37.1  C) Temp src: Oral BP: (!) 93/32 Pulse: 89   Resp: 16 SpO2: 94 % O2 Device: None (Room air) Oxygen Delivery: 4 LPM    Neuro: A&O x4. Able to make needs known.   Cardiac: Soft BP, MD aware of both values. Otherwise WDL.  Respiratory: LS clear bilaterally, denies SOB.  GI/: HD pt, run completed today, tolerated well. Pt reports intermittent nausea, scheduled zofran administered with some relief. One episode of emesis this shift. Pt unable to eat solid foods d/t increased n/v. G tube drainage by gravity, J tube infusing TF at 45 mL/hr, pt tolerating well. No BM this shift.   Skin: Abdominal dressing changes completed, purulent drainage noted under old GJ site. Pt refusing to reposition.   IV/Drains: L triple lumen PICC, heparin locked.   Activity: Assist of 1 in bed, two if pt requests lift.   Nutrition: Pt advanced from clear liquid diet to renal diet this PM.   Pain: Pt reports 7-8/10 abdominal pain partially relieved by prn atarax & oxycodone.      Plan of Care: Pulsate mattress changed this shift. Continue to encourage repositioning as pt will allow. Continue to monitor N/V. Continue to update team with soft blood pressures & report any changes in condition.

## 2021-09-30 NOTE — PROGRESS NOTES
[facility]    Progress Note - Daniela 3 Service  Date of Admission:  6/28/2021    Changes today:  - Bedside echo today without effusion   - Continues to have asymptomatic hypotension     Assessment & Plan              Dax Villareal is a 30 year old man with hx of alcohol use disorder admitted on 6/28/2021 after recent prolonged admission at [facility] in [place] who has severe acute alcoholic hepatitis c/b HE, ESRD requiring HD, and malnutrition in the setting of acute necrotizing pancreatitis, s/p PEG-J placement on 7/13. He arrived with acute respiratory failure and septic shock that have resolved, and his secondary bacterial peritonitis has been appropriately treated. He requires continued management for ESRD, hepatic encephalopathy, infected pancreatic pseudocyst, nutritional support via PEG-J, and persistent right pleural effusion. Clinically improving after multiple necrosectomies. Increasing leukocytosis and hypotension on 9/2 prompting resumption of IV antibiotics - has been stable since that time. Repeat CT with interval improvement in fluid collections.    Necrotizing alcoholic pancreatitis w/ infected pseudocyst, improving  Hepatic Fluid collection, stable to improving  Secondary Bacterial Peritonitis 2/2 infected pseudocyst with VRE, improving  Septic Shock, resolved  Admitted to the [facility] ICU on 6/28/2021 from [facility] in [place] for septic shock due to necrotizing pancreatitis. CT on admit showing mature collection with enhancing wall measuring ~[id number]. IR placed perc drain 6/29. Repeat imaging 7/18 revealed walled off necrotic collection amenable to endoscopic transluminal drainage with cystgastrostomy stenting with necrosectomy on 7/21/21. Complete necrosectomy on 8/11. Antibiotics (cefepime and metronidazole) were discontinued on 8/25 after left flank drain removed, re-initiated in the setting of fever on 9/2. Favor abdominal etiology, repeat  CT on 9/7 with interval improvement in fluid collections - completed 7d course of abx per ID with EOT 9/8.  - Will likely need intermittent antibiotics with fluid collections occasionally with bacterial growth moving forward as to be expected in nec panc  - Palliative care following for pain management and goals of care given overall illness    G-tube Fistula  Dax had been having increasing G-tube output and was found to have an erythematous indurated area next to his G-tube insertion. This was found to be his G-tube balloon pushing through the skin. He underwent G-tube replacement on  9/13/21. His old G-tube site has since had large volume output requiring frequent wound care dressing changes.  - 9/20/21 - EGD to close fistula completed by GI  - Wound team consulted, follow their wound care recs  - Advanced diet 9/27, g-tube to gravity (clamped while eating)    Periapical abscess: L 3rd maxillary molar  Dental Caries  - Dentistry consulted, and discussed with oral surgery  - Amoxicillin for 7d course (9/21-9/27)  - Dental extraction when stable to be [place] (while inpatient[place] [facility]    Hypovolemia  Patient with substantial daily NG output and poor PO. Also note significant weight loss over the past few weeks. He has needed intermittent boluses of normal saline for euvolemia. Have discussed with renal multiple times - please re-evaluate volume status vs dry weight as he is often hypotensive following fluid removal.  - Weight 78kg on 8/13. Then 55 kg, weight 68.5kg on 9/22. Concern for weight loss vs. targeting dry weight.  - Monitor vitals and output post dialysis, consider fluids prn.  - cont midodrine 10 TID with Nephrology    Gastric Outlet Obstruction  High G tube output due to the cystogastric connection/fluid drainage noted on 8/5. Per GI, possibly related to gastric outlet from severe abdominal inflammation and is expected. Continues to have mild to moderate NG output - stable to  improved. Relieved with draining G tube to gravity when experiencing pain/nausea.    Hyponatremia  Patient with down-trending sodium the week of 8/27; likely due to substantial NG output and low solute intake and hepatic dysfunction. Stable 127-128. Then week of 9/27 hyponatremia (120), nephrology following.    Right Pancreatic Hydrothorax, stable  Increased dyspnea with CXR on 7/19 with further imaging and thoracentesis confirming a hepatic/pancreatic hydrothorax. Thoracentesis PRN for dyspnea, most recently 9/3.    ESRD due to ATN  Hyperphosphatemia  Stage III RADHA due to ATN from septic shock without recovery for >3 months. Line is tunneled dialysis catheter from PTA. Nephrology dialyzed on Tu, Th, Sa schedule.  - Will need follow-up and outpatient dialysis  - Sevelemer powder TID with TF for hyperphosphatemia  - Discuss if needs dialysis line replacement     Anemia of chronic illness  Hematochezia, resolved  Hemoperitoneum  Baseline Hgb 17. Running between 6-8 in setting of chronic illness, new ESRD with epocrit per Nephrology, frequent lab draws and procedures. Hematochezia on 8/8 after starting standard intensity heparin, now resolved. Paracentesis on 8/21 with hemoperitoneum. No active bleeding. Required a unit of PRBC on 9/4 in the setting of several fluid boluses.   - Goal hgb > 7.0, trend Hgb     Alcohol Use Disorder  Counseled patient on complete alcohol cessation. Not interested in additional services at this time. Continue daily folic acid and thiamine.  - Ensure patient has resources for treatment at discharge if managing alcohol cessation independently becomes challenging.    Severe malnutrition of chronic illness  Poor oral intake in the setting of necrotizing pancreatitis. PEG tube placed 7/13, replaced 9/14. Continuous tube feeds with goal of 65 ml/hr with continuous pancreatic enzymes. Transitioning to renal formula.   - Continuous tube feeds  - Advancing diet as tolerated to dialysis diet      Diet: Advance diet as tolerated  DVT Prophylaxis: SCDs  Rdz Catheter: Not present  Central Lines: PRESENT  PICC Triple Lumen 06/28/21 Left-Site Assessment: WDL  CVC Double Lumen Right-Site Assessment: WDL    Code Status: Full Code      Disposition Plan   Expected discharge: 7-8 days  recommended to long term care once adequate pain management/ tolerating PO medications and safe disposition plan/ TCU bed available     The patient's care was discussed with the Attending Physician, Dr. Tabares.    Zi Wood MD  PGY-2, Internal Medicine  ______________________________________________________________________    Interval History   Pateint alert and conversant today, tolerated bedside echo. He is having some output around gtube. Updated on plan regarding LTC placement.     Data reviewed today: I reviewed all medications, new labs and imaging results over the last 24 hours. I personally reviewed no images or EKG's today.    Physical Exam   Vital Signs: Temp: 97.5  F (36.4  C) Temp src: Axillary BP: (!) 90/34 (map 56) Pulse: 99   Resp: 17 SpO2: 94 % O2 Device: None (Room air)    Weight: 134 lbs 7.69 oz  General Appearance: chronically ill appearing, in no acute distress  Respiratory: normal work of breathing on ambient air  Cardiovascular: regular rate  GI: soft ND, tender to palpation around Gtube site, declined examination under the dressing  Skin: dressings in place around G tube   Other: Alert, conversant

## 2021-09-30 NOTE — PROGRESS NOTES
Nephrology chart review 9/30/21    Next scheduled HD tomorrow  Will run on K 4 bath  No fluid removal  Please obtain echo    Tory Toledo CNP  Nephrology

## 2021-09-30 NOTE — PLAN OF CARE
"Cares 0700 to 1500    BP (!) 90/34 (BP Location: Right arm)   Pulse 99   Temp 97.5  F (36.4  C) (Axillary)   Resp 17   Ht 1.676 m (5' 6\")   Wt 61 kg (134 lb 7.7 oz)   SpO2 94%   BMI 21.71 kg/m      Hypotensive per pt baseline. Denied SOB. On 4L NC. Endorsed abdominal pain, oxycodone given per MAR. Pt stated that he was awake overnight q3h d/t pain. Pt wanted to switch from oxy to PO dilaudid. Pt stated that PO dilaudid worked better to control his pain, provider notified. Pt has been sleeping mostly all day, refused AM meds after emesis of 200cc. Zofran IV given as scheduled, no emesis since. K+ replaced, recheck at 1800. Dialysis scheduled tomorrow. No intake today except for couple ice chips. Old PEG site dressing saturated 2x, dressing changed. Team notified of site leaking, no further interventions ordered. Continue to monitor pt.   "

## 2021-10-01 ENCOUNTER — APPOINTMENT (OUTPATIENT)
Dept: PHYSICAL THERAPY | Facility: CLINIC | Age: 31
End: 2021-10-01
Attending: INTERNAL MEDICINE
Payer: MEDICAID

## 2021-10-01 LAB
ALBUMIN SERPL-MCNC: 2.7 G/DL (ref 3.4–5)
ALP SERPL-CCNC: 96 U/L (ref 40–150)
ALT SERPL W P-5'-P-CCNC: 17 U/L (ref 0–70)
ANION GAP SERPL CALCULATED.3IONS-SCNC: 12 MMOL/L (ref 3–14)
ANION GAP SERPL CALCULATED.3IONS-SCNC: 12 MMOL/L (ref 3–14)
AST SERPL W P-5'-P-CCNC: 23 U/L (ref 0–45)
BACTERIA PLR CULT: NO GROWTH
BASOPHILS # BLD AUTO: 0.1 10E3/UL (ref 0–0.2)
BASOPHILS NFR BLD AUTO: 1 %
BILIRUB SERPL-MCNC: 0.9 MG/DL (ref 0.2–1.3)
BUN SERPL-MCNC: 51 MG/DL (ref 7–30)
BUN SERPL-MCNC: 51 MG/DL (ref 7–30)
CALCIUM SERPL-MCNC: 10.3 MG/DL (ref 8.5–10.1)
CALCIUM SERPL-MCNC: 10.3 MG/DL (ref 8.5–10.1)
CHLORIDE BLD-SCNC: 91 MMOL/L (ref 94–109)
CHLORIDE BLD-SCNC: 91 MMOL/L (ref 94–109)
CO2 SERPL-SCNC: 22 MMOL/L (ref 20–32)
CO2 SERPL-SCNC: 22 MMOL/L (ref 20–32)
CREAT SERPL-MCNC: 4.27 MG/DL (ref 0.66–1.25)
CREAT SERPL-MCNC: 4.27 MG/DL (ref 0.66–1.25)
EOSINOPHIL # BLD AUTO: 0.4 10E3/UL (ref 0–0.7)
EOSINOPHIL NFR BLD AUTO: 4 %
ERYTHROCYTE [DISTWIDTH] IN BLOOD BY AUTOMATED COUNT: 16 % (ref 10–15)
GFR SERPL CREATININE-BSD FRML MDRD: 17 ML/MIN/1.73M2
GFR SERPL CREATININE-BSD FRML MDRD: 17 ML/MIN/1.73M2
GLUCOSE BLD-MCNC: 127 MG/DL (ref 70–99)
GLUCOSE BLD-MCNC: 127 MG/DL (ref 70–99)
HCT VFR BLD AUTO: 28.4 % (ref 40–53)
HGB BLD-MCNC: 9 G/DL (ref 13.3–17.7)
IMM GRANULOCYTES # BLD: 0.1 10E3/UL
IMM GRANULOCYTES NFR BLD: 1 %
LACTATE SERPL-SCNC: 1.1 MMOL/L (ref 0.7–2)
LYMPHOCYTES # BLD AUTO: 0.8 10E3/UL (ref 0.8–5.3)
LYMPHOCYTES NFR BLD AUTO: 8 %
MAGNESIUM SERPL-MCNC: 1.7 MG/DL (ref 1.6–2.3)
MCH RBC QN AUTO: 30.8 PG (ref 26.5–33)
MCHC RBC AUTO-ENTMCNC: 31.7 G/DL (ref 31.5–36.5)
MCV RBC AUTO: 97 FL (ref 78–100)
MONOCYTES # BLD AUTO: 1.2 10E3/UL (ref 0–1.3)
MONOCYTES NFR BLD AUTO: 12 %
NEUTROPHILS # BLD AUTO: 7.6 10E3/UL (ref 1.6–8.3)
NEUTROPHILS NFR BLD AUTO: 74 %
NRBC # BLD AUTO: 0 10E3/UL
NRBC BLD AUTO-RTO: 0 /100
PHOSPHATE SERPL-MCNC: 5.1 MG/DL (ref 2.5–4.5)
PLATELET # BLD AUTO: 266 10E3/UL (ref 150–450)
POTASSIUM BLD-SCNC: 3.7 MMOL/L (ref 3.4–5.3)
POTASSIUM BLD-SCNC: 3.7 MMOL/L (ref 3.4–5.3)
PROT SERPL-MCNC: 8.7 G/DL (ref 6.8–8.8)
RBC # BLD AUTO: 2.92 10E6/UL (ref 4.4–5.9)
SODIUM SERPL-SCNC: 125 MMOL/L (ref 133–144)
SODIUM SERPL-SCNC: 125 MMOL/L (ref 133–144)
WBC # BLD AUTO: 10 10E3/UL (ref 4–11)

## 2021-10-01 PROCEDURE — 99233 SBSQ HOSP IP/OBS HIGH 50: CPT | Mod: GC | Performed by: INTERNAL MEDICINE

## 2021-10-01 PROCEDURE — C9113 INJ PANTOPRAZOLE SODIUM, VIA: HCPCS | Performed by: INTERNAL MEDICINE

## 2021-10-01 PROCEDURE — 250N000013 HC RX MED GY IP 250 OP 250 PS 637

## 2021-10-01 PROCEDURE — 82374 ASSAY BLOOD CARBON DIOXIDE: CPT | Performed by: INTERNAL MEDICINE

## 2021-10-01 PROCEDURE — 99233 SBSQ HOSP IP/OBS HIGH 50: CPT | Performed by: STUDENT IN AN ORGANIZED HEALTH CARE EDUCATION/TRAINING PROGRAM

## 2021-10-01 PROCEDURE — 83605 ASSAY OF LACTIC ACID: CPT

## 2021-10-01 PROCEDURE — 258N000003 HC RX IP 258 OP 636

## 2021-10-01 PROCEDURE — 84100 ASSAY OF PHOSPHORUS: CPT | Performed by: STUDENT IN AN ORGANIZED HEALTH CARE EDUCATION/TRAINING PROGRAM

## 2021-10-01 PROCEDURE — 250N000013 HC RX MED GY IP 250 OP 250 PS 637: Performed by: INTERNAL MEDICINE

## 2021-10-01 PROCEDURE — 250N000011 HC RX IP 250 OP 636: Performed by: INTERNAL MEDICINE

## 2021-10-01 PROCEDURE — 99233 SBSQ HOSP IP/OBS HIGH 50: CPT

## 2021-10-01 PROCEDURE — 97530 THERAPEUTIC ACTIVITIES: CPT | Mod: GP | Performed by: PHYSICAL THERAPIST

## 2021-10-01 PROCEDURE — 250N000013 HC RX MED GY IP 250 OP 250 PS 637: Performed by: HOSPITALIST

## 2021-10-01 PROCEDURE — 99233 SBSQ HOSP IP/OBS HIGH 50: CPT | Mod: GC | Performed by: STUDENT IN AN ORGANIZED HEALTH CARE EDUCATION/TRAINING PROGRAM

## 2021-10-01 PROCEDURE — 85025 COMPLETE CBC W/AUTO DIFF WBC: CPT | Performed by: STUDENT IN AN ORGANIZED HEALTH CARE EDUCATION/TRAINING PROGRAM

## 2021-10-01 PROCEDURE — 250N000011 HC RX IP 250 OP 636: Performed by: HOSPITALIST

## 2021-10-01 PROCEDURE — 634N000001 HC RX 634

## 2021-10-01 PROCEDURE — 90937 HEMODIALYSIS REPEATED EVAL: CPT

## 2021-10-01 PROCEDURE — 250N000011 HC RX IP 250 OP 636: Performed by: STUDENT IN AN ORGANIZED HEALTH CARE EDUCATION/TRAINING PROGRAM

## 2021-10-01 PROCEDURE — 120N000002 HC R&B MED SURG/OB UMMC

## 2021-10-01 PROCEDURE — 36592 COLLECT BLOOD FROM PICC: CPT | Performed by: INTERNAL MEDICINE

## 2021-10-01 PROCEDURE — 83735 ASSAY OF MAGNESIUM: CPT | Performed by: INTERNAL MEDICINE

## 2021-10-01 RX ORDER — HYDROMORPHONE HYDROCHLORIDE 1 MG/ML
1 SOLUTION ORAL EVERY 4 HOURS PRN
Status: DISCONTINUED | OUTPATIENT
Start: 2021-10-01 | End: 2021-10-03

## 2021-10-01 RX ORDER — ALBUMIN, HUMAN INJ 5% 5 %
50-250 SOLUTION INTRAVENOUS
Status: DISCONTINUED | OUTPATIENT
Start: 2021-10-01 | End: 2021-10-01

## 2021-10-01 RX ORDER — HYDROMORPHONE HYDROCHLORIDE 1 MG/ML
1 SOLUTION ORAL 3 TIMES DAILY
Status: DISCONTINUED | OUTPATIENT
Start: 2021-10-01 | End: 2021-10-08

## 2021-10-01 RX ADMIN — OXYCODONE HYDROCHLORIDE 5 MG: 5 SOLUTION ORAL at 15:46

## 2021-10-01 RX ADMIN — MIDODRINE HYDROCHLORIDE 10 MG: 5 TABLET ORAL at 21:58

## 2021-10-01 RX ADMIN — ONDANSETRON 4 MG: 2 INJECTION INTRAMUSCULAR; INTRAVENOUS at 09:35

## 2021-10-01 RX ADMIN — OXYCODONE HYDROCHLORIDE 5 MG: 5 SOLUTION ORAL at 00:12

## 2021-10-01 RX ADMIN — PANCRELIPASE 2 CAPSULE: 24000; 76000; 120000 CAPSULE, DELAYED RELEASE PELLETS ORAL at 00:55

## 2021-10-01 RX ADMIN — SODIUM CHLORIDE 300 ML: 9 INJECTION, SOLUTION INTRAVENOUS at 13:10

## 2021-10-01 RX ADMIN — METHYLPHENIDATE HYDROCHLORIDE 5 MG: 5 TABLET ORAL at 12:33

## 2021-10-01 RX ADMIN — THIAMINE HCL TAB 100 MG 100 MG: 100 TAB at 18:35

## 2021-10-01 RX ADMIN — EPOETIN ALFA-EPBX 5000 UNITS: 10000 INJECTION, SOLUTION INTRAVENOUS; SUBCUTANEOUS at 14:03

## 2021-10-01 RX ADMIN — Medication 5 ML: at 09:35

## 2021-10-01 RX ADMIN — PANCRELIPASE 2 CAPSULE: 24000; 76000; 120000 CAPSULE, DELAYED RELEASE PELLETS ORAL at 18:35

## 2021-10-01 RX ADMIN — ACETAMINOPHEN 325 MG: 325 SOLUTION ORAL at 21:56

## 2021-10-01 RX ADMIN — MIDODRINE HYDROCHLORIDE 10 MG: 5 TABLET ORAL at 08:05

## 2021-10-01 RX ADMIN — OXYCODONE HYDROCHLORIDE 5 MG: 5 SOLUTION ORAL at 12:31

## 2021-10-01 RX ADMIN — SODIUM CHLORIDE 250 ML: 9 INJECTION, SOLUTION INTRAVENOUS at 13:10

## 2021-10-01 RX ADMIN — OXYCODONE HYDROCHLORIDE 5 MG: 5 SOLUTION ORAL at 07:59

## 2021-10-01 RX ADMIN — Medication: at 13:10

## 2021-10-01 RX ADMIN — SODIUM BICARBONATE 325 MG: 325 TABLET ORAL at 21:58

## 2021-10-01 RX ADMIN — SODIUM BICARBONATE 325 MG: 325 TABLET ORAL at 08:00

## 2021-10-01 RX ADMIN — Medication 5 ML: at 18:37

## 2021-10-01 RX ADMIN — SODIUM BICARBONATE 325 MG: 325 TABLET ORAL at 18:35

## 2021-10-01 RX ADMIN — PANCRELIPASE 2 CAPSULE: 24000; 76000; 120000 CAPSULE, DELAYED RELEASE PELLETS ORAL at 12:33

## 2021-10-01 RX ADMIN — SODIUM BICARBONATE 325 MG: 325 TABLET ORAL at 00:54

## 2021-10-01 RX ADMIN — PANTOPRAZOLE SODIUM 40 MG: 40 INJECTION, POWDER, FOR SOLUTION INTRAVENOUS at 09:35

## 2021-10-01 RX ADMIN — Medication 1 PACKET: at 09:35

## 2021-10-01 RX ADMIN — SEVELAMER CARBONATE 1.6 G: 800 POWDER, FOR SUSPENSION ORAL at 08:06

## 2021-10-01 RX ADMIN — OXYCODONE HYDROCHLORIDE 5 MG: 5 SOLUTION ORAL at 09:35

## 2021-10-01 RX ADMIN — PANCRELIPASE 2 CAPSULE: 24000; 76000; 120000 CAPSULE, DELAYED RELEASE PELLETS ORAL at 22:00

## 2021-10-01 RX ADMIN — HYDROXYZINE HYDROCHLORIDE 50 MG: 25 TABLET, FILM COATED ORAL at 21:58

## 2021-10-01 RX ADMIN — ONDANSETRON 4 MG: 2 INJECTION INTRAMUSCULAR; INTRAVENOUS at 22:07

## 2021-10-01 RX ADMIN — SODIUM BICARBONATE 325 MG: 325 TABLET ORAL at 12:33

## 2021-10-01 RX ADMIN — HYDROXYZINE HYDROCHLORIDE 50 MG: 25 TABLET, FILM COATED ORAL at 12:31

## 2021-10-01 RX ADMIN — HYDROMORPHONE HYDROCHLORIDE 1 MG: 1 SOLUTION ORAL at 21:54

## 2021-10-01 RX ADMIN — Medication 1 PACKET: at 21:59

## 2021-10-01 RX ADMIN — PANCRELIPASE 2 CAPSULE: 24000; 76000; 120000 CAPSULE, DELAYED RELEASE PELLETS ORAL at 08:02

## 2021-10-01 RX ADMIN — SEVELAMER CARBONATE 1.6 G: 800 POWDER, FOR SUSPENSION ORAL at 18:37

## 2021-10-01 RX ADMIN — OXYCODONE HYDROCHLORIDE 5 MG: 5 SOLUTION ORAL at 05:22

## 2021-10-01 RX ADMIN — SEVELAMER CARBONATE 1.6 G: 800 POWDER, FOR SUSPENSION ORAL at 22:08

## 2021-10-01 RX ADMIN — OXYCODONE HYDROCHLORIDE 5 MG: 5 SOLUTION ORAL at 02:10

## 2021-10-01 RX ADMIN — PROCHLORPERAZINE EDISYLATE 5 MG: 5 INJECTION INTRAMUSCULAR; INTRAVENOUS at 18:39

## 2021-10-01 RX ADMIN — PANCRELIPASE 2 CAPSULE: 24000; 76000; 120000 CAPSULE, DELAYED RELEASE PELLETS ORAL at 04:16

## 2021-10-01 RX ADMIN — SODIUM BICARBONATE 325 MG: 325 TABLET ORAL at 04:16

## 2021-10-01 RX ADMIN — CYCLOBENZAPRINE 10 MG: 10 TABLET, FILM COATED ORAL at 12:46

## 2021-10-01 RX ADMIN — METHYLPHENIDATE HYDROCHLORIDE 5 MG: 5 TABLET ORAL at 08:00

## 2021-10-01 RX ADMIN — ACETAMINOPHEN 325 MG: 325 SOLUTION ORAL at 09:35

## 2021-10-01 RX ADMIN — HYDROMORPHONE HYDROCHLORIDE 1 MG: 1 SOLUTION ORAL at 18:35

## 2021-10-01 RX ADMIN — FOLIC ACID 1 MG: 1 TABLET ORAL at 18:35

## 2021-10-01 RX ADMIN — Medication 6 MG: at 00:11

## 2021-10-01 ASSESSMENT — MIFFLIN-ST. JEOR: SCORE: 1517.75

## 2021-10-01 ASSESSMENT — ACTIVITIES OF DAILY LIVING (ADL)
ADLS_ACUITY_SCORE: 18
ADLS_ACUITY_SCORE: 18
ADLS_ACUITY_SCORE: 16
ADLS_ACUITY_SCORE: 18
ADLS_ACUITY_SCORE: 16

## 2021-10-01 NOTE — PROGRESS NOTES
Care Management Follow Up    Length of Stay (days): 95    Expected Discharge Date: 10/01/2021     Concerns to be Addressed: discharge planning     Patient plan of care discussed at interdisciplinary rounds: Yes    Anticipated Discharge Disposition: Skilled Nursing Facilty, Transitional Care     Anticipated Discharge Services:  (New outpatient dialysis)  Anticipated Discharge DME: None    Patient/family educated on Medicare website which has current facility and service quality ratings: no (Pt requesting TCU in or near [place])  Education Provided on the Discharge Plan: yes   Patient/Family in Agreement with the Plan: yes    Referrals Placed by CM/SW: See below.  Private pay costs discussed: Not applicable    Additional Information:  MAI emailed Jody De Leon asking if pt's PMAP insurance has switched to straight MA. MAI received email back from Jody that pt's straight MA is now active. MAI discussed w/ RN CC. Per RN CC, pt's recs are LTACH and she is following up w/ facilities and medical readiness.     Lian FRIEND, CALLIE Manzo   Phone: [phone]  Pager: [phone]

## 2021-10-01 NOTE — PROVIDER NOTIFICATION
Provider notified (name/pager): [phone] Zi Wood MD  Time initial page sent: 0900  Message sent:  5B 36 KR: Critical lab: Wound culture from 9/28: Enterococcus faecium VRE. Bernie RIVAS [id number]  Response from provider: MD verified message received

## 2021-10-01 NOTE — PROGRESS NOTES
[facility]  Palliative Care Daily Progress Note       Recommendations & Counseling       Pain: Dax complains of waking up every 2-2.5 hours at night because of back, abdomen, foot pain.He does not feel that the oxycodone lasts as long as the hydromorphone so would like to try hydromorphone instead of oxycodone.     Will schedule hydromorphone TID and PRN and once he has been on this regimen for several days, I will discuss tapering with him and begin a very slow taper.     Hydromorphone 1mg TID and 1mg q3hrs PRN    Nausea: intermittent nausea and emesis more often in the morning. Recommend switching scheduled zofran via the peg rather than IV as this will allow less need for the picc line and will help set him up for out patient / rehab facility regimen.     Goals of care: Dax's goals are restorative. See discussion below.        Thank you for the opportunity to continue to participate in the care of this patient and family.  Please feel free to contact on-call palliative provider with any emergent needs.  We can be reached via team pager [phone] (answered 8-4:30 Monday-Friday); after-hours answering service ([phone]);     This note was initiated by Jason Mejía MS3, medical student. I personally overwrote and edited each section of this note to reflect my sole personal and direct involvement in the patient's care today.    I, Valentina Varela, was present with the medical student who participated in the service and in the documentation of the note.  I have verified the history and personally performed the physical exam and medical decision making.  I agree with the assessment and plan of care as documented in the note and have edited the note to reflect my medical decision making.  I personally reviewed vital signs, medications, labs and imaging.     Thank you for the opportunity to continue to participate in the care of this patient and family.  Please  feel free to contact on-call palliative provider with any emergent needs.  We can be reached via team pager [phone] (answered 8-4:30 Monday-Friday); after-hours answering service ([phone]);   Physician Attestation:   Total time spent was 35 minutes spent in reviewing record, patient examination and counseling, discussion with primary team and documentation. >50% of time was spent counseling and/or coordination of care regarding symptom management, plan of care, goals of care. .  Valentina Varela MD / Palliative Medicine / Pager [phone] / [facility] Inpatient Team Consult Pager [phone] (answered 8am-430pm M-F) - ok to text page via [employer] / After-Hours Answering Service [phone] / Palliative Clinic in the [facility] - [phone] (scheduling); [phone] (triage).     Assessments          Dax Villareal is a 30 year old man with history of alcohol dependence with a prolonged and complex hospitalization for acute alcoholic hepatitis and associated liver failure that has been complicated by RADHA requiring HD, hepatic encephalopathy, SBP causing septic shock, infected necrotizing pancreatitis requiring drainage and multiple necrosectomies, malnutrition requiring PEG-J leading to fistula s/p endoscopic suture gastroplexy, with continue difficulty with pain management and palliative medicine re-consulted to assist with pain management, nausea and goals of care.      Today, the patient was seen for:  acute alcoholic hepatitis and associated liver failure  RADHA requiring HD  Malnutrition  Complex pain   Nausea   support    Prognosis, Goals, or Advance Care Planning was addressed today with: Yes. Discussed with Dax how he feels about leaving the hospital. He is excited but also nervous because he does not want to be discharged too soon before he is significantly recovered and end up back in the hospital because of this. He is looking forward to physical rehab and getting stronger  again; he has a plan to lift weights and also swim. Asked Dax who he turns to for support: when he says that his friend Josh and Josh's fiance will take him into their house and help care for him. Josh also has a fiancee. Josh's house is not a dry house and when I asked Dax if he worries about being in a place where others are drinking etoh, Dax responded that he is not worries about it as he has done it before and he has no desire to drink anymore. Other sources of support are his family and friends. Asked how he sees his life changing b/c of dialysis: he responded that it is going to be a full time job but he is going to do what he needs to do to make it work. He does have some anxiety about removal of his PICC line because he is worried that he will not be able to absorb medicines given through the PEG; he would be okay with swapping all his meds to PEG and once we are certain that he can take them all that way, removal of the PICC line.    Mood, coping, and/or meaning in the context of serious illness were addressed today: No.              Interval History:     Chart review/discussion with unit or clinical team members:   Discussed with RN how Dax's pain has been and also that we would like to switch his pain medication from oxycodone to hydromorphone. Also discussed recommendation to give zofran by PEG.     Per patient or family/caregivers today:  Pain: pain distribution remains same (back, abdominal, foot). Dax is having trouble sleeping at night because pain relief provided by oxycodone only lasts 2 to 2.5 hours. He feels like he had better pain control on hydromorphone.    Nausea: states he still seems to vomit when trying to eat and the mornings he seems to need time to let his stomach settle before he can work with PT or move around.     Key Palliative Symptoms:   Pain location: back, abdomen, foot    # Pain severity the last 12 hours: moderate    Patient is on opioids: assessed and bowels ok/no  "needed changes to plan of care today.           Review of Systems:     Besides above, a complete 10+ ROS was reviewed and is unremarkable           Medications:     I have reviewed this patient's medication profile and medications during this hospitalization.    Noted meds:    Zofran 4mg q 8  protonix 40mg IV every day  Atarax 25-50mg TID PRN   Compazine 5mg IV q6hrs PRN   Tylenol PRN   Flexeril PRN            Physical Exam:   Vitals were reviewed  /68   Pulse 106   Temp 97.9  F (36.6  C) (Oral)   Resp 22   Ht 1.676 m (5' 6\")   Wt 62 kg (136 lb 11 oz)   SpO2 98%   BMI 22.06 kg/m    Gen: alert, appears chronically ill, in NAD  Eyes: Conjunctiva clear. Sclera anicteric.  HENT: NCAT; mucous membranes slightly dry  Resp: no increased work of breathing, speaking full sentences  Abd: soft, slightly distended, +BS  Msk: no gross deformity  Skin:  trace jaundice, warm and well perfursed  Neuro: A&O x 3; CN II-XII grossly intact;  Mental status/Psych:able to engage in conversation; sensorium intact             Data Reviewed:     Reviewed recent labs and pertinent imaging            "

## 2021-10-01 NOTE — PROVIDER NOTIFICATION
Provider notified (name):  JOE STEINER JR [ Msg Id [id number] ]  Reason for notification:  5B KR 36-1. BP is 87/42. Pt asymptomatic. Thanks. Seabs CHENG RN [phone] ext [phone]   Recommendation/request given to provide r: Notifying per protocol. No follow up needed.   Response from provider:

## 2021-10-01 NOTE — PLAN OF CARE
"Care assumed: [id number]   Vitals  Pain   /46 (BP Location: Right arm)   Pulse 101   Temp (!) 96.1  F (35.6  C) (Oral)   Resp 18   Ht 1.676 m (5' 6\")   Wt 62 kg (136 lb 11 oz)   SpO2 97% 4LPM   BMI 22.06 kg/m            Scheduled and PRN oxycodone    Activity  Lift    Neuro/Mood  A&Ox 4      Denies numbness and tingling    Cardiac  No signs of acute distress    Respiratory  No signs of acute respiratory distress    GI/  PT on Hemodialysis .  Incontinent to bowel       Diet/ Nutrition  Renal diet  1500mL fluid restriction       Skin, Wounds, LDAs REJI skin; Pt refuses both assessment and repositioning   PICC triple lumen   CVC for dialysis   Peg tube; G to gravity drain J for feeding.   Tube feeds at 45mL/hr goal       Plan of Care  Other notes Follow POC  Calls appropriately          "

## 2021-10-01 NOTE — PROGRESS NOTES
HEMODIALYSIS TREATMENT NOTE    Date: 10/1/2021  Time: 16 :30 PM    Data:  Pre Wt: 62 kg (136 lb 11 oz)   Desired Wt: 62.0 kg   Post Wt: 62 kg (136 lb 11 oz)  Weight change: 0 kg  Ultrafiltration - Post Run Net Total Removed (mL): 0 mL  Vascular Access Status: CVC  patent  Dialyzer Rinse: Clear  Total Blood Volume Processed: 60.72 L   Total Dialysis (Treatment) Time: 3 hrs   Dialysate Bath: K 3, Ca 2.25  Heparin: None    Lab:   No    Assessment / Interventions: Assumed care from another Nurse last half hour of his dialysis treatment. Pt completed his treatment time, no issues, blood rinsed back, CVC saline locked, dressing done, pt send back to the floor in a stable condition & hand off report given.   SEE MAR & FLOW SHEET FOR DETAILS.     Plan:    Per Renal Team.

## 2021-10-01 NOTE — PROGRESS NOTES
RN assumed cares at [id number]     Temp: (!) 96  F (35.6  C) Temp src: Oral BP: 90/40 Pulse: 93   Resp: 16 SpO2: 94 % O2 Device: None (Room air)      Neuro: A&O x4. Lethargic today, pt slept majority of shift. Pt became more alert as shift progressed. Able to make needs known.   Cardiac: Soft BP, order parameters updated for when to page providers. Otherwise WDL.  Respiratory: LS clear bilaterally, denies SOB.  GI/: HD pt, run scheduled for tomorrow. Pt reports intermittent nausea, scheduled zofran & compazine x1 aministered with relief. No emesis this shift. Pt still unable to eat solid foods d/t increased n/v. G tube drainage by gravity, J tube infusing TF at 45 mL/hr, pt tolerating well. Scant amount of urine output this shift, no BM.   Skin: Abdominal dressing changed & CDI. Pt repositioning independently.   IV/Drains: L triple lumen PICC, heparin locked.   Activity: Assist of 1 in bed, two if pt requests lift.   Nutrition: Renal diet, no appetite d/t n/v.   Pain: Pt reports 7-8/10 abdominal pain partially relieved by prn atarax, flexeril & oxycodone.      Plan of Care: Pt lethargic this AM & for first half of shift, pt reports feeling better after sleeping. K rechecked following replacement, 4.1 as of this evening. Continue to encourage repositioning as pt will allow. Continue to monitor N/V. Continue to update team with soft blood pressures with new parameters & report any changes in condition.

## 2021-10-01 NOTE — PROGRESS NOTES
[facility]    Progress Note - Daniela 3 Service  Date of Admission:  6/28/2021    Changes today:  - ID consulted for drug resistant polymicrobial wound culture   - Continues to have asymptomatic hypotension   - Will space out labs to q72h   - Plan to remove PICC     Assessment & Plan           Dax Villareal is a 30 year old man with hx of alcohol use disorder admitted on 6/28/2021 after recent prolonged admission at [facility] in [place] who has severe acute alcoholic hepatitis c/b HE, ESRD requiring HD, and malnutrition in the setting of acute necrotizing pancreatitis, s/p PEG-J placement on 7/13. He arrived with acute respiratory failure and septic shock that have resolved, and his secondary bacterial peritonitis has been appropriately treated. He requires continued management for ESRD, hepatic encephalopathy, infected pancreatic pseudocyst, nutritional support via PEG-J, and persistent right pleural effusion. Clinically improving after multiple necrosectomies. Increasing leukocytosis and hypotension on 9/2 prompting resumption of IV antibiotics - has been stable since that time. Repeat CT with interval improvement in fluid collections.    VRE, pseudomonas, and candida in wound culture  Obtained from around G-tube site during a rapid response on 9/28 for lactic acidosis (2.1). Unclear utility of a wound culture, however it is growing multiple drug resistant bacteria. Discussed with ID curbside physician, given complex case and history will place formal consult.   - Will hold off on antibiotics   - ID consult placed     Asymptomatic hypotension   Hypovolemia  Patient with substantial daily NG output and poor PO. Also note significant weight loss over the past few weeks. He has needed intermittent boluses of normal saline for euvolemia. Have discussed with renal multiple times - please re-evaluate volume status vs dry weight as he is often hypotensive following fluid  removal.  - Weight 78kg on 8/13. Then 55 kg, weight 68.5kg on 9/22. Concern for weight loss vs. targeting dry weight.  - Monitor vitals and output post dialysis, consider fluids prn.  - cont midodrine 10 TID with Nephrology    Necrotizing alcoholic pancreatitis w/ infected pseudocyst, improving  Hepatic Fluid collection, stable to improving  Secondary Bacterial Peritonitis 2/2 infected pseudocyst with VRE, improving  Septic Shock, resolved  Admitted to the [facility] ICU on 6/28/2021 from [facility] in [place] for septic shock due to necrotizing pancreatitis. CT on admit showing mature collection with enhancing wall measuring ~[id number]. IR placed perc drain 6/29. Repeat imaging 7/18 revealed walled off necrotic collection amenable to endoscopic transluminal drainage with cystgastrostomy stenting with necrosectomy on 7/21/21. Complete necrosectomy on 8/11. Antibiotics (cefepime and metronidazole) were discontinued on 8/25 after left flank drain removed, re-initiated in the setting of fever on 9/2. Favor abdominal etiology, repeat CT on 9/7 with interval improvement in fluid collections - completed 7d course of abx per ID with EOT 9/8.  - Will likely need intermittent antibiotics with fluid collections occasionally with bacterial growth moving forward as to be expected in nec panc  - Palliative care following for pain management and goals of care given overall illness    G-tube Fistula  Dax had been having increasing G-tube output and was found to have an erythematous indurated area next to his G-tube insertion. This was found to be his G-tube balloon pushing through the skin. He underwent G-tube replacement on  9/13/21. His old G-tube site has since had large volume output requiring frequent wound care dressing changes.  - 9/20/21 - EGD to close fistula completed by GI  - Wound team consulted, follow their wound care recs    Periapical abscess: L 3rd maxillary molar  Dental Caries  - Dentistry consulted, and  discussed with oral surgery  - Amoxicillin for 7d course (9/21-9/27)  - Dental extraction when stable to be transported (while inpatient) to [facility]    Gastric Outlet Obstruction  High G tube output due to the cystogastric connection/fluid drainage noted on 8/5. Per GI, possibly related to gastric outlet from severe abdominal inflammation and is expected. Continues to have mild to moderate NG output - stable to improved. Relieved with draining G tube to gravity when experiencing pain/nausea.    Hyponatremia  Patient with down-trending sodium the week of 8/27; likely due to substantial NG output and low solute intake and hepatic dysfunction. Stable 127-128. Then week of 9/27 hyponatremia (120), likely due to GI losses, nephrology following.    Right Pancreatic Hydrothorax, stable  Increased dyspnea with CXR on 7/19 with further imaging and thoracentesis confirming a hepatic/pancreatic hydrothorax. Thoracentesis PRN for dyspnea, most recently 9/3.    ESRD due to ATN  Hyperphosphatemia  Stage III RADHA due to ATN from septic shock without recovery for >3 months. Line is tunneled dialysis catheter from PTA. Nephrology dialyzed on Tu, Th, Sa schedule.  - Sevelemer powder TID with TF for hyperphosphatemia     Anemia of chronic illness  Hematochezia, resolved  Hemoperitoneum  Baseline Hgb 17. Running between 6-8 in setting of chronic illness, new ESRD with epocrit per Nephrology, frequent lab draws and procedures. Hematochezia on 8/8 after starting standard intensity heparin, now resolved. Paracentesis on 8/21 with hemoperitoneum. No active bleeding. Required a unit of PRBC on 9/4 in the setting of several fluid boluses.   - Goal hgb > 7.0, trend Hgb     Alcohol Use Disorder  Counseled patient on complete alcohol cessation. Not interested in additional services at this time. Continue daily folic acid and thiamine.  - Ensure patient has resources for treatment at discharge if managing alcohol cessation  independently becomes challenging.    Severe malnutrition of chronic illness  Poor oral intake in the setting of necrotizing pancreatitis. PEG tube placed 7/13, replaced 9/14. Continuous tube feeds with goal of 65 ml/hr with continuous pancreatic enzymes. Transitioning to renal formula.   - Continuous tube feeds  - Advancing diet as tolerated     Diet: Advance diet as tolerated  DVT Prophylaxis: SCDs  Rdz Catheter: Not present  Central Lines: PRESENT  PICC Triple Lumen 06/28/21 Left-Site Assessment: WDL  CVC Double Lumen Right-Site Assessment: WDL    Code Status: Full Code      Disposition Plan   Expected discharge: 7-8 days  recommended to long term care once adequate pain management/ tolerating PO medications and safe disposition plan/ TCU bed available     The patient's care was discussed with the Attending Physician, Dr. Tabares.    Zi Wood MD  PGY-2, Internal Medicine  ______________________________________________________________________    Interval History   ROXANN Continues to have low blood pressures and is asymptomatic. He feels okay, has not been eating much, having soft stools. No trouble breathing.     Data reviewed today: I reviewed all medications, new labs and imaging results over the last 24 hours. I personally reviewed no images or EKG's today.    Physical Exam   Vital Signs: Temp: (!) 96.1  F (35.6  C) Temp src: Oral BP: 101/46 Pulse: 101   Resp: 18 SpO2: 97 % O2 Device: Nasal cannula Oxygen Delivery: 4 LPM  Weight: 136 lbs 10.96 oz  General Appearance: chronically ill appearing, in no acute distress  Respiratory: normal work of breathing on ambient air  Cardiovascular: regular rate  GI: soft ND, no tenderness to palpation of abdomen, dressings around G tube  Skin: dressings in place around G tube   Other: Alert, conversant, moving extremities spontaneously

## 2021-10-01 NOTE — PROGRESS NOTES
Care Management Follow Up    Length of Stay (days): 95    Expected Discharge Date: 10/01/2021     Concerns to be Addressed: discharge planning for LTACH    Additional Information:  RNCC spoke with Rahul at [facility]. They see that patients MA is active but it is showing a term date of end of November. [facility] has sent this to their corporate to get clarification. Nothing will be resolved over the weekend. RNCC will check back with [facility] Monday. Patient is on the waiting list. If insurance issue clarified potential for discharge early next week.    RNCC will continue to follow and assist as needed.    ROB Blankenship RN, BSN  5B RN Care Coordinator  [phone] phone  [phone] pager    Weekend or Holiday Care Coordinator [phone] pager  Job Code [phone]

## 2021-10-01 NOTE — PROGRESS NOTES
Nephrology Progress Note  10/01/2021     Dax Villareal is a 30 year old male with h/o ETOH hepatitis, end stage liver disease, RADHA on HD, transferred from [facility] in [place] for septic shock on 6/28/21 for treatment of necrotizing pancreatitis and decompensated liver disease. He was initially admitted to [facility] 5/19/21.       Assessment & Recommendations:     1 ESRD- Patient has developed ESRD given his unrecovered RADHA.    - Has Right TDC   - Continue MWF schedule   - RNCC working on possible LTACH admission now that his MA is active      2. Volume status - No dyspnea/edema. On supplemental oxygen.  No Pre run weight today. Weight was 62 kg on 9/30.  Admission weight 80.2 kg. SBP / Intake 890 ml+/Output: G- 450 ml. He is anuric   - UF goal today is no weight off     3. CV - SBP . Echo 9/30 neg for pericardial effusion. On Midodrine 10 mg TID   - No weight off on HD today     4. ETOH hepatitis/ recurrent ascites, HE - Patient with heavy ETOH use. Last ETOH unknown. TB improved from 20's to 1.4. Off Lactulose/Rifax. Remains on  thiamine, Folate.   - Per GI     5. Electrolytes - Chronic hyponatremia with pre run Na 125, K 3.7.     - He should be on a 1.5 liter/day fluid restriction given that he is anuric on HD      6.  Acid base - No acute concerns. Bicarb 22     7. BMD - Ca 10.3, Phos 4.4 ( improved from 10.5), albumin 2.5     Continue Renvela 1.6 g TID for phos binder    8. Antimicrobials - none    9. Anemia - Hgb 9.0   - Iron studies 8/21: Ferritin 2186, Fe 28, IS 21   - He is not iron deficient   - Continue Epo 5000 unit(s) IV on HD. He is likely quite Epo resistant given infection/inflammatory state     Recommendations were communicated to primary team via progress note    Tory Toledo, NP   [phone]    Interval History :   Nursing and provider notes from last 24 hours reviewed.  No acute renal changes   Scheduled for routine HD today    Review of Systems:   I reviewed the following  "systems:  Currently w/o nausea or abdominal pain. Denies CP, dyspnea. He is anuric. No edema    Physical Exam:   I/O last 3 completed shifts:  In: 890 [NG/GT:440]  Out: 460 [Urine:10; Emesis/NG output:450]   /52   Pulse 104   Temp 97.9  F (36.6  C) (Oral)   Resp 20   Ht 1.676 m (5' 6\")   Wt 62 kg (136 lb 11 oz)   SpO2 99%   BMI 22.06 kg/m       GENERAL APPEARANCE: Comfortable, resting  Pulmonary: CTA on left, right diminished  CV: RRR   - Edema - no LE edema  GI: mild distention, G/J tube present w/G tube to gravity and large output.    SKIN: no rash, warm, dry, no cyanosis  NEURO: interactive  ACCESS: Right TDC     Labs:   All labs reviewed by me  Electrolytes/Renal -   Recent Labs   Lab Test 10/01/21  1052 09/30/21 2013 09/30/21  1054 09/29/21  0311 09/28/21  1005   *  125* 127* 128*   < > 128*   POTASSIUM 3.7  3.7 4.1 2.9*   < > 3.1*   CHLORIDE 91*  91* 93* 92*   < > 92*   CO2 22  22 25 25   < > 23   BUN 51*  51* 42* 34*   < > 37*   CR 4.27*  4.27* 3.58* 3.13*   < > 3.17*   *  127* 94 114*   < > 130*   JANENE 10.3*  10.3* 9.6 9.5   < > 9.5   MAG 1.7 1.9 1.9   < > 1.8   PHOS 5.1*  --  4.3  --  4.4    < > = values in this interval not displayed.       CBC -   Recent Labs   Lab Test 10/01/21  1052 09/30/21  1054 09/28/21  1005   WBC 10.0 7.8 8.4   HGB 9.0* 8.8* 8.7*    259 257       LFTs -   Recent Labs   Lab Test 10/01/21  1052 09/28/21  1005 09/04/21  0414 09/03/21  0523 09/03/21  0523   ALKPHOS 96  --  81  --  95   BILITOTAL 0.9  --  1.4*  --  2.0*   ALT 17  --  29  --  31   AST 23  --  38  --  39   PROTTOTAL 8.7  --  8.8  --  9.2*   ALBUMIN 2.7* 2.5* 2.9*   < > 2.3*    < > = values in this interval not displayed.       Iron Panel -   Recent Labs   Lab Test 08/14/21 2055 07/19/21  0632   IRON 28* 31*   IRONSAT 21 43   JERRELL 2,186*  --          Imaging:      Current Medications:    sodium chloride 0.9%  250 mL Intravenous Once in dialysis/CRRT     sodium chloride 0.9%  300 mL " Hemodialysis Machine Once     sodium bicarbonate  325 mg Per Feeding Tube Q4H    And     amylase-lipase-protease  1-2 capsule Per Feeding Tube Q4H     amylase-lipase-protease  2 capsule Per G Tube TID w/meals     B and C vitamin Complex with folic acid  5 mL Per Feeding Tube Daily     epoetin charles-epbx (RETACRIT) inj ESRD  5,000 Units Intravenous Once in dialysis/CRRT     fiber modular (NUTRISOURCE FIBER)  1 packet Per Feeding Tube TID     folic acid  1 mg Oral or Feeding Tube Daily     heparin lock flush  5-20 mL Intracatheter Q24H     influenza quadrivalent (PF) vacc  0.5 mL Intramuscular Prior to discharge     melatonin  6 mg Oral or Feeding Tube At Bedtime     menthol   Transdermal Q8H     methylphenidate  5 mg Per Feeding Tube BID     midodrine  10 mg Per Feeding Tube TID     - MEDICATION INSTRUCTIONS -   Does not apply Once     ondansetron  4 mg Oral TID    Or     ondansetron  4 mg Intravenous TID     oxyCODONE  5 mg Oral or Feeding Tube Q8H     pantoprazole (PROTONIX) IV  40 mg Intravenous Daily with breakfast     protein modular  1 packet Per Feeding Tube Daily     sevelamer carbonate (RENVELA)  1.6 g Oral or Feeding Tube TID     sodium chloride (PF)  3 mL Intracatheter Q8H     sodium chloride (PF)  9 mL Intracatheter During Dialysis/CRRT (from stock)     sodium chloride (PF)  9 mL Intracatheter During Dialysis/CRRT (from stock)     vitamin B1  100 mg Oral or Feeding Tube Daily    Or     thiamine  100 mg Intravenous Daily       - MEDICATION INSTRUCTIONS -       dextrose       heparin (porcine) 1,000 Units/hr (08/24/21 0907)     Tory Toledo, NP

## 2021-10-02 LAB
ANION GAP SERPL CALCULATED.3IONS-SCNC: 8 MMOL/L (ref 3–14)
BACTERIA WND CULT: ABNORMAL
BUN SERPL-MCNC: 36 MG/DL (ref 7–30)
CALCIUM SERPL-MCNC: 9.8 MG/DL (ref 8.5–10.1)
CHLORIDE BLD-SCNC: 92 MMOL/L (ref 94–109)
CHOLEST SERPL-MCNC: 162 MG/DL
CO2 SERPL-SCNC: 26 MMOL/L (ref 20–32)
CREAT SERPL-MCNC: 3.27 MG/DL (ref 0.66–1.25)
ERYTHROCYTE [DISTWIDTH] IN BLOOD BY AUTOMATED COUNT: 16.1 % (ref 10–15)
GFR SERPL CREATININE-BSD FRML MDRD: 24 ML/MIN/1.73M2
GLUCOSE BLD-MCNC: 102 MG/DL (ref 70–99)
GRAM STAIN RESULT: ABNORMAL
GRAM STAIN RESULT: ABNORMAL
HCT VFR BLD AUTO: 28 % (ref 40–53)
HDLC SERPL-MCNC: 27 MG/DL
HGB BLD-MCNC: 8.9 G/DL (ref 13.3–17.7)
LDLC SERPL CALC-MCNC: 92 MG/DL
MCH RBC QN AUTO: 30.8 PG (ref 26.5–33)
MCHC RBC AUTO-ENTMCNC: 31.8 G/DL (ref 31.5–36.5)
MCV RBC AUTO: 97 FL (ref 78–100)
NONHDLC SERPL-MCNC: 135 MG/DL
PLATELET # BLD AUTO: 239 10E3/UL (ref 150–450)
POTASSIUM BLD-SCNC: 3.3 MMOL/L (ref 3.4–5.3)
RBC # BLD AUTO: 2.89 10E6/UL (ref 4.4–5.9)
SODIUM SERPL-SCNC: 126 MMOL/L (ref 133–144)
TRIGL SERPL-MCNC: 216 MG/DL
WBC # BLD AUTO: 9.2 10E3/UL (ref 4–11)

## 2021-10-02 PROCEDURE — 85027 COMPLETE CBC AUTOMATED: CPT

## 2021-10-02 PROCEDURE — 250N000013 HC RX MED GY IP 250 OP 250 PS 637

## 2021-10-02 PROCEDURE — 36592 COLLECT BLOOD FROM PICC: CPT

## 2021-10-02 PROCEDURE — 250N000013 HC RX MED GY IP 250 OP 250 PS 637: Performed by: INTERNAL MEDICINE

## 2021-10-02 PROCEDURE — 99232 SBSQ HOSP IP/OBS MODERATE 35: CPT | Performed by: INTERNAL MEDICINE

## 2021-10-02 PROCEDURE — 99207 PR CDG-MDM COMPONENT: MEETS MODERATE - UP CODED: CPT | Performed by: STUDENT IN AN ORGANIZED HEALTH CARE EDUCATION/TRAINING PROGRAM

## 2021-10-02 PROCEDURE — 250N000011 HC RX IP 250 OP 636: Performed by: INTERNAL MEDICINE

## 2021-10-02 PROCEDURE — 80061 LIPID PANEL: CPT

## 2021-10-02 PROCEDURE — 99233 SBSQ HOSP IP/OBS HIGH 50: CPT | Mod: GC | Performed by: STUDENT IN AN ORGANIZED HEALTH CARE EDUCATION/TRAINING PROGRAM

## 2021-10-02 PROCEDURE — 250N000011 HC RX IP 250 OP 636: Performed by: STUDENT IN AN ORGANIZED HEALTH CARE EDUCATION/TRAINING PROGRAM

## 2021-10-02 PROCEDURE — C9113 INJ PANTOPRAZOLE SODIUM, VIA: HCPCS | Performed by: INTERNAL MEDICINE

## 2021-10-02 PROCEDURE — 120N000002 HC R&B MED SURG/OB UMMC

## 2021-10-02 PROCEDURE — 250N000011 HC RX IP 250 OP 636: Performed by: HOSPITALIST

## 2021-10-02 PROCEDURE — 80048 BASIC METABOLIC PNL TOTAL CA: CPT

## 2021-10-02 RX ORDER — MIDODRINE HYDROCHLORIDE 5 MG/1
15 TABLET ORAL 3 TIMES DAILY
Status: DISCONTINUED | OUTPATIENT
Start: 2021-10-02 | End: 2021-10-12 | Stop reason: HOSPADM

## 2021-10-02 RX ORDER — PROCHLORPERAZINE MALEATE 5 MG
5 TABLET ORAL EVERY 6 HOURS PRN
Status: DISCONTINUED | OUTPATIENT
Start: 2021-10-02 | End: 2021-10-12 | Stop reason: HOSPADM

## 2021-10-02 RX ADMIN — SEVELAMER CARBONATE 1.6 G: 800 POWDER, FOR SUSPENSION ORAL at 09:09

## 2021-10-02 RX ADMIN — SODIUM BICARBONATE 325 MG: 325 TABLET ORAL at 21:25

## 2021-10-02 RX ADMIN — SODIUM BICARBONATE 325 MG: 325 TABLET ORAL at 16:08

## 2021-10-02 RX ADMIN — HYDROMORPHONE HYDROCHLORIDE 1 MG: 1 SOLUTION ORAL at 22:02

## 2021-10-02 RX ADMIN — HYDROMORPHONE HYDROCHLORIDE 1 MG: 1 SOLUTION ORAL at 14:09

## 2021-10-02 RX ADMIN — METHYLPHENIDATE HYDROCHLORIDE 5 MG: 5 TABLET ORAL at 09:10

## 2021-10-02 RX ADMIN — HYDROMORPHONE HYDROCHLORIDE 1 MG: 1 SOLUTION ORAL at 16:05

## 2021-10-02 RX ADMIN — METHYLPHENIDATE HYDROCHLORIDE 5 MG: 5 TABLET ORAL at 12:56

## 2021-10-02 RX ADMIN — SEVELAMER CARBONATE 1.6 G: 800 POWDER, FOR SUSPENSION ORAL at 16:05

## 2021-10-02 RX ADMIN — CYCLOBENZAPRINE 10 MG: 10 TABLET, FILM COATED ORAL at 16:05

## 2021-10-02 RX ADMIN — Medication 1 PACKET: at 09:10

## 2021-10-02 RX ADMIN — Medication 5 ML: at 09:09

## 2021-10-02 RX ADMIN — HYDROMORPHONE HYDROCHLORIDE 1 MG: 1 SOLUTION ORAL at 12:00

## 2021-10-02 RX ADMIN — SODIUM BICARBONATE 325 MG: 325 TABLET ORAL at 09:10

## 2021-10-02 RX ADMIN — SEVELAMER CARBONATE 1.6 G: 800 POWDER, FOR SUSPENSION ORAL at 21:24

## 2021-10-02 RX ADMIN — HYDROXYZINE HYDROCHLORIDE 50 MG: 25 TABLET, FILM COATED ORAL at 16:05

## 2021-10-02 RX ADMIN — HYDROMORPHONE HYDROCHLORIDE 1 MG: 1 SOLUTION ORAL at 09:09

## 2021-10-02 RX ADMIN — PANCRELIPASE 2 CAPSULE: 24000; 76000; 120000 CAPSULE, DELAYED RELEASE PELLETS ORAL at 09:10

## 2021-10-02 RX ADMIN — PANCRELIPASE 2 CAPSULE: 24000; 76000; 120000 CAPSULE, DELAYED RELEASE PELLETS ORAL at 21:25

## 2021-10-02 RX ADMIN — FOLIC ACID 1 MG: 1 TABLET ORAL at 09:10

## 2021-10-02 RX ADMIN — PROCHLORPERAZINE EDISYLATE 5 MG: 5 INJECTION INTRAMUSCULAR; INTRAVENOUS at 05:09

## 2021-10-02 RX ADMIN — PANCRELIPASE 2 CAPSULE: 24000; 76000; 120000 CAPSULE, DELAYED RELEASE PELLETS ORAL at 02:24

## 2021-10-02 RX ADMIN — SODIUM BICARBONATE 325 MG: 325 TABLET ORAL at 02:25

## 2021-10-02 RX ADMIN — MIDODRINE HYDROCHLORIDE 15 MG: 5 TABLET ORAL at 21:23

## 2021-10-02 RX ADMIN — ONDANSETRON 4 MG: 4 TABLET, ORALLY DISINTEGRATING ORAL at 21:24

## 2021-10-02 RX ADMIN — PANCRELIPASE 2 CAPSULE: 24000; 76000; 120000 CAPSULE, DELAYED RELEASE PELLETS ORAL at 12:56

## 2021-10-02 RX ADMIN — THIAMINE HCL TAB 100 MG 100 MG: 100 TAB at 09:10

## 2021-10-02 RX ADMIN — PANCRELIPASE 2 CAPSULE: 24000; 76000; 120000 CAPSULE, DELAYED RELEASE PELLETS ORAL at 16:08

## 2021-10-02 RX ADMIN — PANTOPRAZOLE SODIUM 40 MG: 40 INJECTION, POWDER, FOR SOLUTION INTRAVENOUS at 09:10

## 2021-10-02 RX ADMIN — ACETAMINOPHEN 325 MG: 325 SOLUTION ORAL at 21:23

## 2021-10-02 RX ADMIN — ONDANSETRON 4 MG: 2 INJECTION INTRAMUSCULAR; INTRAVENOUS at 14:14

## 2021-10-02 RX ADMIN — MIDODRINE HYDROCHLORIDE 10 MG: 5 TABLET ORAL at 09:10

## 2021-10-02 RX ADMIN — Medication 5 ML: at 10:28

## 2021-10-02 RX ADMIN — HYDROMORPHONE HYDROCHLORIDE 1 MG: 1 SOLUTION ORAL at 05:09

## 2021-10-02 RX ADMIN — SODIUM BICARBONATE 325 MG: 325 TABLET ORAL at 12:56

## 2021-10-02 RX ADMIN — Medication 1 PACKET: at 13:05

## 2021-10-02 RX ADMIN — MIDODRINE HYDROCHLORIDE 10 MG: 5 TABLET ORAL at 14:09

## 2021-10-02 RX ADMIN — ONDANSETRON 4 MG: 2 INJECTION INTRAMUSCULAR; INTRAVENOUS at 09:09

## 2021-10-02 RX ADMIN — Medication 1 PACKET: at 09:20

## 2021-10-02 RX ADMIN — Medication 1 PACKET: at 21:27

## 2021-10-02 ASSESSMENT — ACTIVITIES OF DAILY LIVING (ADL)
ADLS_ACUITY_SCORE: 20
ADLS_ACUITY_SCORE: 18
ADLS_ACUITY_SCORE: 16
ADLS_ACUITY_SCORE: 18

## 2021-10-02 ASSESSMENT — MIFFLIN-ST. JEOR: SCORE: 1477.75

## 2021-10-02 NOTE — PLAN OF CARE
Assumed cares: [id number]    Events: Critical wound culture, see provider notification note.   VS: Hypotensive, on RA throughout shift but on 2-4L NC after dialysis. Tachycardic.   IV: PICC heparin locked except red lumen for scheduled IV meds.     Neuro: A&Ox4  GI/: Anuric, on hemodialysis. 1x BM in dialysis. G tube to gravity drainage.   Nutrition: Pt refusing food. J tube infusing TF @ 45mL/hr goal rate.   Skin: Abdominal dressing changed.  Pain: Pt c/o pain in abdomen w/ adequate relief from PRN flexeril, dilaudid via GJ tube. Pt requesting PRN atarax.   Activity: Pt turn assist x1.     Plan: Dialysis MWF.

## 2021-10-02 NOTE — PROGRESS NOTES
[facility]    Progress Note - Daniela 3 Service  Date of Admission:  6/28/2021    Changes today:  - Per ID no treatment for wound culture  - Remove PICC today (patient declined yesterday)  - Wound care consult placed for g-tube site  - Switched IV meds to PO  - Vitals to BID, labs to q72h  - Will follow-up with nephrology for recs regarding persistent hypokalemia, hyponatremia, and hypotension as approaching discharge.  - Continues to have asymptomatic hypotension  - Plan to remove PICC  - Social work pursuing LTC placement    Assessment & Plan           Dax Villareal is a 30 year old man with hx of alcohol use disorder admitted on 6/28/2021 after recent prolonged admission at [facility] in [place] who has severe acute alcoholic hepatitis c/b HE, ESRD requiring HD, and malnutrition in the setting of acute necrotizing pancreatitis, s/p PEG-J placement on 7/13. He arrived with acute respiratory failure and septic shock that have resolved, and his secondary bacterial peritonitis has been appropriately treated. He requires continued management for ESRD, hepatic encephalopathy, infected pancreatic pseudocyst, nutritional support via PEG-J, and persistent right pleural effusion. Clinically improving after multiple necrosectomies. Increasing leukocytosis and hypotension on 9/2 prompting resumption of IV antibiotics - has been stable since that time. Repeat CT with interval improvement in fluid collections.    VRE, pseudomonas, and candida in wound culture  Obtained from around G-tube site during a rapid response on 9/28 for lactic acidosis (2.1). Unclear utility of a wound culture, however it is growing multiple drug resistant bacteria. Discussed with ID vesnabside physician, given complex case and history will place formal consult. Per ID, no treatment needed.  - No antibiotics   - ID signed off    Asymptomatic hypotension   Hypovolemia  Patient with substantial daily NG output and  poor PO. Also note significant weight loss over the past few weeks. He has needed intermittent boluses of normal saline for euvolemia. Have discussed with renal multiple times - please re-evaluate volume status vs dry weight as he is often hypotensive following fluid removal.  - Weight 78kg on 8/13. Then 55 kg, weight 68.5kg on 9/22. Concern for weight loss vs. targeting dry weight.  - Monitor vitals and output post dialysis, consider fluids prn.  - cont midodrine 10 TID with Nephrology    Necrotizing alcoholic pancreatitis w/ infected pseudocyst, improving  Hepatic Fluid collection, stable to improving  Secondary Bacterial Peritonitis 2/2 infected pseudocyst with VRE, improving  Septic Shock, resolved  Admitted to the [facility] ICU on 6/28/2021 from [facility] in [place] for septic shock due to necrotizing pancreatitis. CT on admit showing mature collection with enhancing wall measuring ~[id number]. IR placed perc drain 6/29. Repeat imaging 7/18 revealed walled off necrotic collection amenable to endoscopic transluminal drainage with cystgastrostomy stenting with necrosectomy on 7/21/21. Complete necrosectomy on 8/11. Antibiotics (cefepime and metronidazole) were discontinued on 8/25 after left flank drain removed, re-initiated in the setting of fever on 9/2. Favor abdominal etiology, repeat CT on 9/7 with interval improvement in fluid collections - completed 7d course of abx per ID with EOT 9/8.  - Will likely need intermittent antibiotics with fluid collections occasionally with bacterial growth moving forward as to be expected in nec panc  - Palliative care following for pain management and goals of care given overall illness    G-tube Fistula  Dax had been having increasing G-tube output and was found to have an erythematous indurated area next to his G-tube insertion. This was found to be his G-tube balloon pushing through the skin. He underwent G-tube replacement on  9/13/21. His old G-tube site has since  had large volume output requiring frequent wound care dressing changes.  - 9/20/21 - EGD to close fistula completed by GI  - Wound team consulted, follow their wound care recs    Periapical abscess: L 3rd maxillary molar  Dental Caries  - Dentistry consulted, and discussed with oral surgery  - Amoxicillin for 7d course (9/21-9/27)  - Dental extraction when stable to be transported (while inpatient) to [facility]    Gastric Outlet Obstruction  High G tube output due to the cystogastric connection/fluid drainage noted on 8/5. Per GI, possibly related to gastric outlet from severe abdominal inflammation and is expected. Continues to have mild to moderate NG output - stable to improved. Relieved with draining G tube to gravity when experiencing pain/nausea.    Hyponatremia  Patient with down-trending sodium the week of 8/27; likely due to substantial NG output and low solute intake and hepatic dysfunction. Stable 127-128. Then week of 9/27 hyponatremia (120), likely due to GI losses, nephrology following.    Right Pancreatic Hydrothorax, stable  Increased dyspnea with CXR on 7/19 with further imaging and thoracentesis confirming a hepatic/pancreatic hydrothorax. Thoracentesis PRN for dyspnea, most recently 9/3.    ESRD due to ATN  Hyperphosphatemia  Stage III RADHA due to ATN from septic shock without recovery for >3 months. Line is tunneled dialysis catheter from [facility]. Nephrology dialyzed on Tu, Th, Sa schedule.  - Sevelemer powder TID with TF for hyperphosphatemia     Anemia of chronic illness  Hematochezia, resolved  Hemoperitoneum  Baseline Hgb 17. Running between 6-8 in setting of chronic illness, new ESRD with epocrit per Nephrology, frequent lab draws and procedures. Hematochezia on 8/8 after starting standard intensity heparin, now resolved. Paracentesis on 8/21 with hemoperitoneum. No active bleeding. Required a unit of PRBC on 9/4 in the setting of several fluid boluses.   - Goal hgb > 7.0, trend  Hgb     Alcohol Use Disorder  Counseled patient on complete alcohol cessation. Not interested in additional services at this time. Continue daily folic acid and thiamine.  - Ensure patient has resources for treatment at discharge if managing alcohol cessation independently becomes challenging.    Severe malnutrition of chronic illness  Poor oral intake in the setting of necrotizing pancreatitis. PEG tube placed 7/13, replaced 9/14. Continuous tube feeds with goal of 65 ml/hr with continuous pancreatic enzymes. Transitioning to renal formula.   - Continuous tube feeds  - Advancing diet as tolerated     Diet: Advance diet as tolerated  DVT Prophylaxis: SCDs  Rdz Catheter: Not present  Central Lines: PRESENT  PICC Triple Lumen 06/28/21 Left-Site Assessment: WDL  CVC Double Lumen Right-Site Assessment: WDL    Code Status: Full Code      Disposition Plan   Expected discharge: 7-8 days  recommended to long term care once adequate pain management/ tolerating PO medications and safe disposition plan/ TCU bed available     The patient's care was discussed with the Attending Physician, Dr. Tabares.    Zi Wood MD  PGY-2, Internal Medicine  ______________________________________________________________________    Interval History   NAEO. Patient reports continued fatigue. Wishes to sleep.    Data reviewed today: I reviewed all medications, new labs and imaging results over the last 24 hours. I personally reviewed no images or EKG's today.    Physical Exam   Vital Signs: Temp: 97.5  F (36.4  C) Temp src: Oral BP: 107/43 Pulse: 98   Resp: 22 SpO2: 97 % O2 Device: Nasal cannula Oxygen Delivery: 6 LPM  Weight: 136 lbs 10.96 oz  General Appearance: chronically ill appearing, in no acute distress  Respiratory: normal work of breathing on ambient air  Cardiovascular: regular rate  GI: Declined  Skin: dressings in place around G tube  Other: Alert, conversant, moving extremities spontaneously

## 2021-10-02 NOTE — PROVIDER NOTIFICATION
Provider notified (name/pager): [phone]  Time initial page sent: [phone]  Message sent: Low 5B 36 KR: Can we order a WOC reconsult? Concern for wound deterioration of old gtube site w/ current plan of care and new green drainage around gtube. Bernie RIVAS [id number]  Response from provider: MD ordered WOC reconsult.

## 2021-10-02 NOTE — PROGRESS NOTES
GENERAL ID SERVICE PROGRESS NOTE (Call Back Question)     Patient:  Dax Villareal   Date of birth 1990, Medical record number [id number]  Date of Visit:  10/02/2021  Date of Admission: 6/28/2021          Assessment and Recommendations:   ASSESSMENT:  1. Wound Cx with Pseudomonas, E.cloacae, and VRE from G-tube site, without clinical evidence of infection  2. Known hypotension, tachycardia   3. Fever - resolved  4. Right pleural effusion, known - s/p thoracentesis, without clear evidence of infection  5. Necrotizing alcoholic pancreatitis with infected pseudocyst, s/p cystogastrostomy with necrosectomies and s/p flank drain removal (08/25)  6. Septate ascites  7. RADHA, requiring dialysis    RECOMMENDATION:  -- No indication for restarting antibiotics at this time, continue to monitor      DISCUSSION:   Dax has had a complicated hospital course after being admitted on 6/28/21 for necrotizing pancreatitis. He has had numerous courses of antibiotics for sepsis and infected pseudocysts, last finishing cefepime and flagyl on 9/8/21. Secondarily, he had a short amoxacillin course (9/21/-9/28) for dental purposes while awaiting tooth extractions. However, in the last days to week he states he has been continuing to improve without fevers, chills, worsening abdominal pain, cough, significant bowel changes, rash, or joint pain.    It appears on 9/28 he had some hypotension and elevated lactate after dialysis. As part of workup, the site around his PEG was swabbed and now is growing Pseudomonas, E.cloacae, and VRE. ID consulted for recommendations on the significance of the cultures. No antibiotics have been started.     Given that the patient remains clinically stable to slowly improving since the cultures have been collected, lack of fevers, normal WBC, and that fact that this was a superficial swab of a site where GI bugs make sense - I do not think any treatment is necessary. Further, the G tube site itself looks  fine without any areas of fluctuance or extending erythema. Recommend to continue to monitor off abx currently.       ID will sign off at this time. Thank you for the question on this complicated patient.    Akshat Michelle MD  Infectious Diseases  [phone]    ________________________________________________________________    Consult Question:.  Admission Diagnosis: Pancreatitis [K85.90]         History of Present Illness:     Dax Villareal is a 30 year old male with PMHx significant for alcohol use disorder who was admitted to [facility] in [place], [place] for abdominal pain, nausea and vomiting, found to have end stage liver disease c/b hepatic encephalopathy, acute renal failure requiring iHD, SBP and acute necrotizing pancreatitis.      He required respiratory support (BIPAP, ? Intubation) for respiratory failure and appeared to be in sepsis (lactate reported at 7.7 with abnormal LFTs). He was transferred to [facility] for prolonged admission and reportedly treated with steroids; kidney function and pancreatic enzymes normal on admission). Length of stay stated >40 days. CT imaging 6/19 with concern for necrotizing pancreatitis with follow up CT scan 6/25 with peripancreatic fluid collection and 9 cm soft tissue/hemorrhagic structure c/f internal hemorrhage of a pseudocyst. Given multiple antibiotics (Cefepime, Flagyl, Ciprofloxacin, micafungin). Hospital course reportedly complicated by hypoxemic respiratory failure requiring BIPAP, ARF requiring iHD, hepatic encephalopathy, and septic shock 2/2 necrotizing pancreatitis/pancreatic pseudocyst requiring pressor support. Ultimately transferred to [facility] for interventions 6/28. Admitted to the ICU.      Now status post perc IR drain placement 6/29 with cultures that remain NGTD/negative. No endoscopic drainage by GI was pursued given improvement in imaging 7/11. Additionally has had 3 paracenteses (7/2, 7/5, and 7/13 with negative culture work up.  "Had PEGJ placed 7/13. His left flank drain was removed and his antibiotics were discontinued on 08/25.      ID reconsulted and another course of antibiotics was given from 9/2 - 9/8 for recurrent leukocytosis thought from possible \"relapse\" of infected ascites/pseudocyst. Patient overall has done fairly well from an ID standpoint since then.    ID called back on 10/1/21 for help with interpretation of a superficial culture from PEG site. Patient without any infectious symptoms currently. In the last days to week he states he has been continuing to improve without fevers, chills, worsening abdominal pain, cough, significant bowel changes, rash, or joint pain.         Review of Systems:   Complete 5pt ROS performed, see HPI.         Past Medical History:     Past Medical History:   Diagnosis Date     ADHD 2008            Past Surgical History:     Past Surgical History:   Procedure Laterality Date     COMBINED ESOPHAGOSCOPY, GASTROSCOPY, DUODENOSCOPY (EGD), TISSUE APPOSI N/A 9/20/2021    Procedure: ESOPHAGOGASTRODUODENOSCOPY WITH SUTURE FISTULA CLOSURE, argon plasma coagulation;  Surgeon: Jose Quigley MD;  Location: [facility] OR     ENDOSCOPIC INSERTION TUBE GASTROSTOMY N/A 7/13/2021    Procedure: Upper endoscopy, INSERTION, PEG-J TUBE and Gastropexy;  Surgeon: Rayshawn Will MD;  Location: [facility]     ENDOSCOPIC INSERTION TUBE GASTROSTOMY  9/13/2021    Procedure: PEG/J PLACEMENT.;  Surgeon: Rayshawn Will MD;  Location: [facility]     ENDOSCOPIC ULTRASOUND UPPER GASTROINTESTINAL TRACT (GI) N/A 7/21/2021    Procedure: ENDOSCOPIC ULTRASOUND, ESOPHAGOSCOPY / UPPER GASTROINTESTINAL TRACT (GI) with cyst gastrostomy, dilation;  Surgeon: Guru Yecenia Chacko MD;  Location: [facility]     ENDOSCOPIC ULTRASOUND, ESOPHAGOSCOPY, GASTROSCOPY, DUODENOSCOPY (EGD), NECROSECTOMY N/A 8/11/2021    Procedure: ESOPHAGOGASTRODUODENOSCOPY (EGD) with necrosectomy, stent exchange.;  Surgeon: Amadou Beasley MD;  " "Location: [facility] OR     ESOPHAGOSCOPY, GASTROSCOPY, DUODENOSCOPY (EGD), COMBINED N/A 7/28/2021    Procedure: ESOPHAGOGASTRODUODENOSCOPY (EGD), gastrostomy apposition, Necrosectomy, stent exchange.;  Surgeon: Rayshawn Will MD;  Location: [facility]     ESOPHAGOSCOPY, GASTROSCOPY, DUODENOSCOPY (EGD), COMBINED N/A 8/6/2021    Procedure: ESOPHAGOGASTRODUODENOSCOPY (EGD) with necrosectomy, and stents exchanged;  Surgeon: Jose Quigley MD;  Location: [facility]     ESOPHAGOSCOPY, GASTROSCOPY, DUODENOSCOPY (EGD), COMBINED Left 9/13/2021    Procedure: ESOPHAGOGASTRODUODENOSCOPY (EGD), GASTROSTOMY TUBE REMOVAL, FISTULAR CLOSURE ENDOSCOPIC.;  Surgeon: Rayshawn Will MD;  Location: [facility]     IR ABSCESS TUBE CHANGE  7/14/2021     IR PARACENTESIS  7/21/2021     IR SINOGRAM INJECTION DIAGNOSTIC  8/25/2021     ORTHOPEDIC SURGERY Right     fracture repair     REPLACE GASTROSTOMY TUBE, PERCUTANEOUS N/A 7/28/2021    Procedure: gastro-jejunostomy tube exchange;  Surgeon: Rayshawn Will MD;  Location: [facility] OR            Family History:   Reviewed and non-contributory.   Family History   Problem Relation Age of Onset     Attention Deficit Disorder Mother      Alcoholism Mother      Alcoholism Father      Alcoholism Brother      Alcoholism Sister             Social History:     Social History     Tobacco Use     Smoking status: Current Every Day Smoker     Packs/day: 0.50     Types: Cigarettes     Smokeless tobacco: Never Used     Tobacco comment: 8/24/2021 Patient did not want to discuss quitting but would like 4 mg lozenge to use during admission. Patient also accepted \"Quitting for Good\" workbook   Substance Use Topics     Alcohol use: Yes     History   Sexual Activity     Sexual activity: Not on file            Current Medications:       sodium bicarbonate  325 mg Per Feeding Tube Q4H    And     amylase-lipase-protease  1-2 capsule Per Feeding Tube Q4H     amylase-lipase-protease  2 capsule Per G Tube TID w/meals     B and " C vitamin Complex with folic acid  5 mL Per Feeding Tube Daily     fiber modular (NUTRISOURCE FIBER)  1 packet Per Feeding Tube TID     folic acid  1 mg Oral or Feeding Tube Daily     heparin lock flush  5-20 mL Intracatheter Q24H     HYDROmorphone (STANDARD CONC)  1 mg Oral TID     influenza quadrivalent (PF) vacc  0.5 mL Intramuscular Prior to discharge     melatonin  6 mg Oral or Feeding Tube At Bedtime     menthol   Transdermal Q8H     methylphenidate  5 mg Per Feeding Tube BID     midodrine  10 mg Per Feeding Tube TID     ondansetron  4 mg Oral TID    Or     ondansetron  4 mg Intravenous TID     pantoprazole (PROTONIX) IV  40 mg Intravenous Daily with breakfast     protein modular  1 packet Per Feeding Tube Daily     sevelamer carbonate (RENVELA)  1.6 g Oral or Feeding Tube TID     sodium chloride (PF)  3 mL Intracatheter Q8H     vitamin B1  100 mg Oral or Feeding Tube Daily    Or     thiamine  100 mg Intravenous Daily            Allergies:     Allergies   Allergen Reactions     Tylenol [Acetaminophen] Itching            Physical Exam:   Vitals were reviewed  Patient Vitals for the past 24 hrs:   BP Temp Temp src Pulse Resp SpO2 Weight   10/02/21 0555 107/43 97.5  F (36.4  C) Oral 98 -- 97 % --   10/01/21 2300 (!) 81/29 98.6  F (37  C) Oral 109 -- 96 % --   10/01/21 1704 95/42 (!) 95.9  F (35.5  C) Oral 109 -- 98 % --   10/01/21 1620 112/67 -- -- 104 -- 100 % --   10/01/21 1618 98/43 -- -- 101 -- 99 % --   10/01/21 1615 105/48 -- -- 105 -- 98 % 62 kg (136 lb 11 oz)   10/01/21 1600 106/58 -- -- 104 -- 99 % --   10/01/21 1545 102/68 -- -- 106 22 98 % --   10/01/21 1530 113/58 -- -- 107 17 97 % --   10/01/21 1515 113/45 -- -- 99 22 99 % --   10/01/21 1500 102/49 -- -- 94 16 100 % --   10/01/21 1445 93/44 -- -- 93 18 100 % --   10/01/21 1430 98/49 -- -- 94 16 100 % --   10/01/21 1415 100/51 -- -- 97 19 99 % --   10/01/21 1400 103/58 -- -- 98 17 99 % --   10/01/21 1345 99/49 -- -- 99 15 98 % --   10/01/21 1330 98/47  -- -- 107 18 97 % --   10/01/21 1315 103/52 -- -- 104 20 99 % --   10/01/21 1310 98/47 -- -- 105 22 99 % --   10/01/21 1307 99/46 97.9  F (36.6  C) Oral 104 18 99 % --   10/01/21 1238 98/44 -- -- -- -- -- --       Physical Examination:  GENERAL: Well-developed, malnourished appearing, seen in dialysis.  HEENT:  Head is normocephalic, atraumatic.   EYES:  Eyes have anicteric sclerae without conjunctival injection or stigmata of endocarditis.    ENT:  Oropharynx is moist without exudates or ulcers. Poor dentition noted.  CARDIOVASCULAR: No cyanosis. Tachycardic.  ABDOMEN:  Normal bowel sounds, soft, nontender. PEG site examined without any areas of fluctuance or spreading erythema.  SKIN:  No acute rashes.  Line(s) are in place without any surrounding erythema or exudate. No stigmata of endocarditis.  NEUROLOGIC:  Grossly nonfocal. Active x4 extremities         Laboratory Data:     Inflammatory Markers    Recent Labs   Lab Test 09/26/21  0705 09/25/21  0710 09/22/21  0527 09/19/21  0719 09/16/21  0533 09/13/21  0631 09/10/21  0741 09/09/21  0800   CRP 47.0* 54.0* 56.0* 58.0* 23.0* 45.0* 37.0* 44.0*       Hematology Studies    Recent Labs   Lab Test 10/01/21  1052 09/30/21  1054 09/28/21  1005 09/26/21  0705 09/24/21  0847 09/24/21  0847 09/23/21  1030 09/23/21  1030 08/10/21  0631 08/09/21  1312 08/03/21  0620 08/02/21  1720 08/02/21  0603 08/02/21  0603 06/29/21  0410 06/28/21  1839 05/18/21  1220 05/18/21  1220 12/09/20  1504 12/09/20  1504   WBC 10.0 7.8 8.4 11.2*  --  9.4  --  11.3*   < > 15.1*   < > 15.6*   < > 15.7*  15.7*   < > 50.6*   < > 13.2*   < > 4.2   ANEU  --   --   --   --   --   --   --   --   --  11.6*  --  12.0*  --  11.9*  --  44.0*  --  12.0*  --  2.1   AEOS  --   --   --   --   --   --   --   --   --  0.2  --  0.3  --  0.0  --  0.5  --  0.0  --  0.2   HGB 9.0* 8.8* 8.7* 9.3*  --  9.3*  --  9.1*   < > 8.3*   < > 8.1*   < > 8.1*  8.1*   < > 7.6*   < > 16.6   < > 17.2   MCV 97 98 96 94  --  95  --   96   < > 90   < > 92   < > 94  94   < > 101*   < > 98   < > 93    259 257 286   < > 253   < > 246   < > 290   < > 125*   < > 110*  110*   < > 127*   < > 222   < > 299    < > = values in this interval not displayed.       Metabolic Studies     Recent Labs   Lab Test 10/01/21  1052 09/30/21  2013 09/30/21  1054 09/29/21  1744 09/29/21  0940   *  125* 127* 128* 130* 125*   POTASSIUM 3.7  3.7 4.1 2.9* 3.4 4.2   CHLORIDE 91*  91* 93* 92* 97 89*   CO2 22  22 25 25 27 22   BUN 51*  51* 42* 34* 20 56*   CR 4.27*  4.27* 3.58* 3.13* 2.11* 4.29*   GFRESTIMATED 17*  17* 21* 25* 40* 17*       Hepatic Studies    Recent Labs   Lab Test 10/01/21  1052 09/28/21  1005 09/04/21  0414 09/03/21  0523 09/02/21  1730 08/31/21  0837 08/27/21  0731 08/27/21  0731   BILITOTAL 0.9  --  1.4* 2.0* 2.2* 1.7*  --  1.5*   ALKPHOS 96  --  81 95 120 100  --  104   ALBUMIN 2.7* 2.5* 2.9* 2.3* 2.7* 2.1*   < > 2.1*   AST 23  --  38 39 57* 50*  --  56*   ALT 17  --  29 31 40 31  --  24    < > = values in this interval not displayed.       Microbiology:  Culture Micro   Date Value Ref Range Status   07/05/2021 No growth  Final   07/05/2021 Culture negative after 4 weeks  Final   07/02/2021 No growth  Final   07/02/2021 No growth  Final   07/02/2021 No growth  Final   06/30/2021 No growth  Final   06/30/2021 No anaerobes isolated  Final   06/29/2021 No growth  Final   06/29/2021 No growth  Final   05/18/2021 No growth after 6 days  Final   05/18/2021 No growth after 6 days  Final       Urine Studies    Recent Labs   Lab Test 12/09/20  1817 10/06/20  1214   LEUKEST Negative Negative   WBCU 0 1       Vancomycin Levels    Recent Labs   Lab Test 09/08/21  0710 09/06/21  0646 06/30/21  0700   VANCOMYCIN 27.1* 25.2* 31.2*       Hepatitis B Testing   Recent Labs   Lab Test 07/29/21  0537 06/30/21  1245   HBCAB Nonreactive  --    HEPBANG Nonreactive Nonreactive     Hepatitis C Testing     Hepatitis C Antibody   Date Value Ref Range Status    07/29/2021 Nonreactive Nonreactive Final     Respiratory Virus Testing    No results found for: RS, FLUAG    Culture   Date Value Ref Range Status   09/28/2021 3+ Pseudomonas aeruginosa (A)  Final   09/28/2021 3+ Enterobacter cloacae complex (A)  Final   09/28/2021 1+ Enterococcus faecium VRE (A)  Final   09/28/2021 1+ Candida albicans (A)  Final     Comment:     Susceptibilities not routinely done   09/28/2021 No growth after 3 days  Preliminary   09/28/2021 No growth after 3 days  Preliminary   09/23/2021 No Growth  Final   09/23/2021 No Growth  Final   09/19/2021 No Growth  Final   09/03/2021 No Growth  Final

## 2021-10-02 NOTE — PLAN OF CARE
"/43 (BP Location: Right arm)   Pulse 98   Temp 97.5  F (36.4  C) (Oral)   Resp 22   Ht 1.676 m (5' 6\")   Wt 62 kg (136 lb 11 oz)   SpO2 97%   BMI 22.06 kg/m    tachycardic, hypotensive. Patient had emesis this am and requested TF held @ 0530.  1 dose prn  compazine given as well as 1 dose PRN dialudid PO given.  No other acute events overnight. 1 loose stool. Continue to monitor and follow POC. Notify provider of changes.  "

## 2021-10-02 NOTE — PROGRESS NOTES
"  Nephrology Progress Note  10/02/2021     Dax Villareal is a 30 year old male with h/o ETOH hepatitis, end stage liver disease, RADHA on HD, transferred from [facility] in [place] for septic shock on 6/28/21 for treatment of necrotizing pancreatitis and decompensated liver disease. He was initially admitted to [facility] 5/19/21.       Assessment & Recommendations:     1 ESRD- Patient has developed ESRD given his unrecovered RADHA.    - Has Right TDC   - Continue MWF schedule   - RNCC working on possible LTACH admission now that his MA is active      9. Anemia - Hgb 9.0   - Iron studies 8/21: Ferritin 2186, Fe 28, IS 21   - He is not iron deficient   - Continue Epo 5000 unit(s) IV on HD. He is likely quite Epo resistant given infection/inflammatory state     Recommendations were communicated to primary team via progress note    Ladarius Reid MD  Division of Nephrology and Hypertension  Pager: [phone]  October 2, 2021  6:10 PM      Interval History :   Nursing and provider notes from last 24 hours reviewed.  No acute renal changes   Patient reports  Some N today.  NO SOB, NO Abd pain  Had routine HD yesterday 10/1/21    Review of Systems:   I reviewed the following systems:  Currently w/o nausea or abdominal pain. Denies CP, dyspnea. He is anuric. No edema    Physical Exam:   I/O last 3 completed shifts:  In: 330 [NG/GT:330]  Out: 1925 [Emesis/NG output:1925]   /43 (BP Location: Right arm)   Pulse 98   Temp 97.5  F (36.4  C) (Oral)   Resp 22   Ht 1.676 m (5' 6\")   Wt 62 kg (136 lb 11 oz)   SpO2 97%   BMI 22.06 kg/m       GENERAL APPEARANCE: Comfortable, resting  Pulmonary: CTA on left, right diminished  CV: RRR   - Edema - no LE edema  GI: mild distention, G/J tube present w/G tube to gravity and large output.    SKIN: no rash, warm, dry, no cyanosis  NEURO: interactive  ACCESS: Right TDC     Labs:   All labs reviewed by me  Electrolytes/Renal -   Recent Labs   Lab Test 10/01/21  1052 09/30/21 2013 " 09/30/21  1054 09/29/21  0311 09/28/21  1005   *  125* 127* 128*   < > 128*   POTASSIUM 3.7  3.7 4.1 2.9*   < > 3.1*   CHLORIDE 91*  91* 93* 92*   < > 92*   CO2 22  22 25 25   < > 23   BUN 51*  51* 42* 34*   < > 37*   CR 4.27*  4.27* 3.58* 3.13*   < > 3.17*   *  127* 94 114*   < > 130*   JANENE 10.3*  10.3* 9.6 9.5   < > 9.5   MAG 1.7 1.9 1.9   < > 1.8   PHOS 5.1*  --  4.3  --  4.4    < > = values in this interval not displayed.       CBC -   Recent Labs   Lab Test 10/01/21  1052 09/30/21  1054 09/28/21  1005   WBC 10.0 7.8 8.4   HGB 9.0* 8.8* 8.7*    259 257       LFTs -   Recent Labs   Lab Test 10/01/21  1052 09/28/21  1005 09/04/21  0414 09/03/21  0523 09/03/21  0523   ALKPHOS 96  --  81  --  95   BILITOTAL 0.9  --  1.4*  --  2.0*   ALT 17  --  29  --  31   AST 23  --  38  --  39   PROTTOTAL 8.7  --  8.8  --  9.2*   ALBUMIN 2.7* 2.5* 2.9*   < > 2.3*    < > = values in this interval not displayed.       Iron Panel -   Recent Labs   Lab Test 08/14/21 2055 07/19/21  0632   IRON 28* 31*   IRONSAT 21 43   JERRELL 2,186*  --          Imaging:      Current Medications:    sodium bicarbonate  325 mg Per Feeding Tube Q4H    And     amylase-lipase-protease  1-2 capsule Per Feeding Tube Q4H     amylase-lipase-protease  2 capsule Per G Tube TID w/meals     B and C vitamin Complex with folic acid  5 mL Per Feeding Tube Daily     fiber modular (NUTRISOURCE FIBER)  1 packet Per Feeding Tube TID     folic acid  1 mg Oral or Feeding Tube Daily     heparin lock flush  5-20 mL Intracatheter Q24H     HYDROmorphone (STANDARD CONC)  1 mg Oral TID     influenza quadrivalent (PF) vacc  0.5 mL Intramuscular Prior to discharge     melatonin  6 mg Oral or Feeding Tube At Bedtime     menthol   Transdermal Q8H     methylphenidate  5 mg Per Feeding Tube BID     midodrine  10 mg Per Feeding Tube TID     ondansetron  4 mg Oral TID    Or     ondansetron  4 mg Intravenous TID     pantoprazole (PROTONIX) IV  40 mg Intravenous  Daily with breakfast     protein modular  1 packet Per Feeding Tube Daily     sevelamer carbonate (RENVELA)  1.6 g Oral or Feeding Tube TID     sodium chloride (PF)  3 mL Intracatheter Q8H     vitamin B1  100 mg Oral or Feeding Tube Daily    Or     thiamine  100 mg Intravenous Daily       - MEDICATION INSTRUCTIONS -       dextrose       heparin (porcine) 1,000 Units/hr (08/24/21 0981)     Ladarius Reid MD

## 2021-10-02 NOTE — PLAN OF CARE
Assumed cares: [id number]     Events: PICC removed. IV meds switched to oral and PICC removed. Labs to be drawn on dialysis. Vitals switched to BID.   VS: Asymptomic hypotension, on RA to 4L NC per pt request. Tachycardic.     GI/: Anuric, on hemodialysis. 1x BM. G tube to gravity drainage.   Nutrition: Poor appetite. J tube infusing TF @ 45mL/hr goal rate.   Skin: Abdominal dressing changed, WOC consult placed for g-tube site due to noted skin deterioration and green drainage.   Pain: Pt c/o pain in abdomen w/ adequate relief from PRN flexeril, dilaudid via GJ tube. Pt requesting PRN atarax.      Plan: Dialysis MWF. Social work pursuing LTC placement

## 2021-10-03 LAB
BACTERIA BLD CULT: NO GROWTH
BACTERIA BLD CULT: NO GROWTH

## 2021-10-03 PROCEDURE — 250N000013 HC RX MED GY IP 250 OP 250 PS 637

## 2021-10-03 PROCEDURE — 250N000013 HC RX MED GY IP 250 OP 250 PS 637: Performed by: INTERNAL MEDICINE

## 2021-10-03 PROCEDURE — 99233 SBSQ HOSP IP/OBS HIGH 50: CPT | Mod: GC | Performed by: STUDENT IN AN ORGANIZED HEALTH CARE EDUCATION/TRAINING PROGRAM

## 2021-10-03 PROCEDURE — 120N000002 HC R&B MED SURG/OB UMMC

## 2021-10-03 PROCEDURE — 250N000011 HC RX IP 250 OP 636: Performed by: HOSPITALIST

## 2021-10-03 PROCEDURE — 99207 PR CDG-MDM COMPONENT: MEETS MODERATE - UP CODED: CPT | Performed by: STUDENT IN AN ORGANIZED HEALTH CARE EDUCATION/TRAINING PROGRAM

## 2021-10-03 RX ORDER — POTASSIUM CHLORIDE 1.5 G/1.58G
20 POWDER, FOR SOLUTION ORAL DAILY
Status: DISCONTINUED | OUTPATIENT
Start: 2021-10-04 | End: 2021-10-12 | Stop reason: HOSPADM

## 2021-10-03 RX ORDER — HYDROMORPHONE HYDROCHLORIDE 1 MG/ML
1 SOLUTION ORAL
Status: DISCONTINUED | OUTPATIENT
Start: 2021-10-03 | End: 2021-10-12 | Stop reason: HOSPADM

## 2021-10-03 RX ORDER — POTASSIUM CHLORIDE 1.5 G/1.58G
40 POWDER, FOR SOLUTION ORAL ONCE
Status: COMPLETED | OUTPATIENT
Start: 2021-10-03 | End: 2021-10-03

## 2021-10-03 RX ADMIN — METHYLPHENIDATE HYDROCHLORIDE 5 MG: 5 TABLET ORAL at 08:00

## 2021-10-03 RX ADMIN — SODIUM BICARBONATE 325 MG: 325 TABLET ORAL at 20:47

## 2021-10-03 RX ADMIN — Medication 1 PACKET: at 08:02

## 2021-10-03 RX ADMIN — PANCRELIPASE 2 CAPSULE: 24000; 76000; 120000 CAPSULE, DELAYED RELEASE PELLETS ORAL at 17:09

## 2021-10-03 RX ADMIN — SODIUM BICARBONATE 325 MG: 325 TABLET ORAL at 07:18

## 2021-10-03 RX ADMIN — Medication 1 PACKET: at 14:28

## 2021-10-03 RX ADMIN — SODIUM BICARBONATE 325 MG: 325 TABLET ORAL at 17:09

## 2021-10-03 RX ADMIN — SEVELAMER CARBONATE 1.6 G: 800 POWDER, FOR SUSPENSION ORAL at 20:44

## 2021-10-03 RX ADMIN — HYDROMORPHONE HYDROCHLORIDE 1 MG: 1 SOLUTION ORAL at 04:29

## 2021-10-03 RX ADMIN — MIDODRINE HYDROCHLORIDE 15 MG: 5 TABLET ORAL at 07:59

## 2021-10-03 RX ADMIN — SODIUM BICARBONATE 325 MG: 325 TABLET ORAL at 02:52

## 2021-10-03 RX ADMIN — HYDROMORPHONE HYDROCHLORIDE 1 MG: 1 SOLUTION ORAL at 17:09

## 2021-10-03 RX ADMIN — Medication 1 PACKET: at 08:01

## 2021-10-03 RX ADMIN — HYDROXYZINE HYDROCHLORIDE 50 MG: 25 TABLET, FILM COATED ORAL at 12:12

## 2021-10-03 RX ADMIN — ACETAMINOPHEN 325 MG: 325 SOLUTION ORAL at 02:53

## 2021-10-03 RX ADMIN — Medication 1 PACKET: at 20:56

## 2021-10-03 RX ADMIN — PANCRELIPASE 2 CAPSULE: 24000; 76000; 120000 CAPSULE, DELAYED RELEASE PELLETS ORAL at 12:11

## 2021-10-03 RX ADMIN — ONDANSETRON 4 MG: 4 TABLET, ORALLY DISINTEGRATING ORAL at 08:00

## 2021-10-03 RX ADMIN — FOLIC ACID 1 MG: 1 TABLET ORAL at 08:00

## 2021-10-03 RX ADMIN — ACETAMINOPHEN 325 MG: 325 SOLUTION ORAL at 18:14

## 2021-10-03 RX ADMIN — MIDODRINE HYDROCHLORIDE 15 MG: 5 TABLET ORAL at 14:21

## 2021-10-03 RX ADMIN — PANCRELIPASE 2 CAPSULE: 24000; 76000; 120000 CAPSULE, DELAYED RELEASE PELLETS ORAL at 02:52

## 2021-10-03 RX ADMIN — ONDANSETRON 4 MG: 4 TABLET, ORALLY DISINTEGRATING ORAL at 14:21

## 2021-10-03 RX ADMIN — PANCRELIPASE 2 CAPSULE: 24000; 76000; 120000 CAPSULE, DELAYED RELEASE PELLETS ORAL at 07:19

## 2021-10-03 RX ADMIN — HYDROMORPHONE HYDROCHLORIDE 1 MG: 1 SOLUTION ORAL at 14:21

## 2021-10-03 RX ADMIN — HYDROMORPHONE HYDROCHLORIDE 1 MG: 1 SOLUTION ORAL at 20:42

## 2021-10-03 RX ADMIN — SEVELAMER CARBONATE 1.6 G: 800 POWDER, FOR SUSPENSION ORAL at 17:09

## 2021-10-03 RX ADMIN — THIAMINE HCL TAB 100 MG 100 MG: 100 TAB at 08:00

## 2021-10-03 RX ADMIN — HYDROXYZINE HYDROCHLORIDE 50 MG: 25 TABLET, FILM COATED ORAL at 17:09

## 2021-10-03 RX ADMIN — Medication 40 MG: at 07:59

## 2021-10-03 RX ADMIN — SODIUM BICARBONATE 325 MG: 325 TABLET ORAL at 12:12

## 2021-10-03 RX ADMIN — HYDROMORPHONE HYDROCHLORIDE 1 MG: 1 SOLUTION ORAL at 00:17

## 2021-10-03 RX ADMIN — METHYLPHENIDATE HYDROCHLORIDE 5 MG: 5 TABLET ORAL at 12:14

## 2021-10-03 RX ADMIN — Medication 5 ML: at 08:00

## 2021-10-03 RX ADMIN — SEVELAMER CARBONATE 1.6 G: 800 POWDER, FOR SUSPENSION ORAL at 08:00

## 2021-10-03 RX ADMIN — POTASSIUM CHLORIDE 40 MEQ: 1.5 POWDER, FOR SOLUTION ORAL at 08:00

## 2021-10-03 RX ADMIN — HYDROXYZINE HYDROCHLORIDE 50 MG: 25 TABLET, FILM COATED ORAL at 02:54

## 2021-10-03 RX ADMIN — ONDANSETRON 4 MG: 4 TABLET, ORALLY DISINTEGRATING ORAL at 20:47

## 2021-10-03 RX ADMIN — PANCRELIPASE 2 CAPSULE: 24000; 76000; 120000 CAPSULE, DELAYED RELEASE PELLETS ORAL at 20:47

## 2021-10-03 RX ADMIN — CYCLOBENZAPRINE 10 MG: 10 TABLET, FILM COATED ORAL at 12:12

## 2021-10-03 RX ADMIN — MIDODRINE HYDROCHLORIDE 15 MG: 5 TABLET ORAL at 20:47

## 2021-10-03 RX ADMIN — HYDROMORPHONE HYDROCHLORIDE 1 MG: 1 SOLUTION ORAL at 22:30

## 2021-10-03 RX ADMIN — HYDROMORPHONE HYDROCHLORIDE 1 MG: 1 SOLUTION ORAL at 08:00

## 2021-10-03 ASSESSMENT — ACTIVITIES OF DAILY LIVING (ADL)
ADLS_ACUITY_SCORE: 18

## 2021-10-03 ASSESSMENT — MIFFLIN-ST. JEOR: SCORE: 1527.75

## 2021-10-03 NOTE — PLAN OF CARE
"Assumed cares 0700 - 1900     BP 96/41 (BP Location: Left arm)   Pulse 88   Temp 97.8  F (36.6  C) (Oral)   Resp 18   Ht 1.676 m (5' 6\")   Wt 63 kg (138 lb 14.2 oz)   SpO2 95%   BMI 22.42 kg/m      A&Ox4. VSS on 4 L NC for comfort per pt request ex hypotensive. Endorsed pain in back, abdomen, and feet controlled with scheduled and PRN dilaudid, tylenol, and flexeril. Intermittent nausea controlled with zofran. Dyspnea on exertion. Assist x1 with turns. Dialysis diet w/ a 1500 ml FR, poor appetite. G tube to gravity drainage bag, J tube TF @ 45 ml/hr. Old tube site dressing changed. No IV access, MD aware.  "

## 2021-10-03 NOTE — PROGRESS NOTES
[facility]    Progress Note - Daniela 3 Service  Date of Admission:  6/28/2021    Changes today:  - PICC out 10/2  - Continues to have asymptomatic hypotension  - Per nephrology, hypokalemia will be managed with 3x/week dialysis, hyponatremia and hypotension are related to chronic illness and liver disease, they say there is nothing to do apart from maintaining a 1.5L fluid restriction  - Social work pursuing LTC placement  - Increased midodrine to 15 TID on 10/2  - 40mEq K today, scheduling 20mEq K in feeding tube starting 10/4 (okay per by nephrology)  - Poor pain control overnight, palliative care note recommended q3h hydromorphone so updated his prn dose to this (from q4h prn)    Assessment & Plan             Dax Villareal is a 30 year old man with hx of alcohol use disorder admitted on 6/28/2021 after recent prolonged admission at [facility] in [place] who has severe acute alcoholic hepatitis c/b HE, ESRD requiring HD, and malnutrition in the setting of acute necrotizing pancreatitis, s/p PEG-J placement on 7/13. He arrived with acute respiratory failure and septic shock that have resolved, and his secondary bacterial peritonitis has been appropriately treated. He requires continued management for ESRD, hepatic encephalopathy, infected pancreatic pseudocyst, nutritional support via PEG-J, and persistent right pleural effusion. Clinically improving after multiple necrosectomies. Increasing leukocytosis and hypotension on 9/2 prompting resumption of IV antibiotics - has been stable since that time. Repeat CT with interval improvement in fluid collections.    VRE, pseudomonas, and candida in wound culture  Obtained from around G-tube site during a rapid response on 9/28 for lactic acidosis (2.1). Unclear utility of a wound culture, however it is growing multiple drug resistant bacteria. Discussed with ARTHUR mendez physician, given complex case and history will place formal  consult. Per ID, no treatment needed.  - No antibiotics  - ID signed off    Asymptomatic hypotension   Hypovolemia  Patient with substantial daily NG output and poor PO. Also note significant weight loss over the past few weeks. He has needed intermittent boluses of normal saline for euvolemia. Have discussed with renal multiple times - please re-evaluate volume status vs dry weight as he is often hypotensive following fluid removal.  - Weight 78kg on 8/13. Then 55 kg, weight 68.5kg on 9/22. Concern for weight loss vs. targeting dry weight.  - Monitor vitals and output post dialysis, consider fluids prn.  - cont midodrine 15 TID with Nephrology    Necrotizing alcoholic pancreatitis w/ infected pseudocyst, improving  Hepatic Fluid collection, stable to improving  Secondary Bacterial Peritonitis 2/2 infected pseudocyst with VRE, improving  Septic Shock, resolved  Admitted to the [facility] ICU on 6/28/2021 from [facility] in [place] for septic shock due to necrotizing pancreatitis. CT on admit showing mature collection with enhancing wall measuring ~[id number]. IR placed perc drain 6/29. Repeat imaging 7/18 revealed walled off necrotic collection amenable to endoscopic transluminal drainage with cystgastrostomy stenting with necrosectomy on 7/21/21. Complete necrosectomy on 8/11. Antibiotics (cefepime and metronidazole) were discontinued on 8/25 after left flank drain removed, re-initiated in the setting of fever on 9/2. Favor abdominal etiology, repeat CT on 9/7 with interval improvement in fluid collections - completed 7d course of abx per ID with EOT 9/8.  - Will likely need intermittent antibiotics with fluid collections occasionally with bacterial growth moving forward as to be expected in nec panc  - Palliative care following for pain management and goals of care given overall illness    G-tube Fistula  Dax had been having increasing G-tube output and was found to have an erythematous indurated area next to  his G-tube insertion. This was found to be his G-tube balloon pushing through the skin. He underwent G-tube replacement on  9/13/21. His old G-tube site has since had large volume output requiring frequent wound care dressing changes.  - 9/20/21 - EGD to close fistula completed by GI  - Wound team consulted, follow their wound care recs    Periapical abscess: L 3rd maxillary molar  Dental Caries  - Dentistry consulted, and discussed with oral surgery  - Amoxicillin for 7d course (9/21-9/27)  - Dental extraction when stable to be transported (while inpatient) to [facility]    Gastric Outlet Obstruction  High G tube output due to the cystogastric connection/fluid drainage noted on 8/5. Per GI, possibly related to gastric outlet from severe abdominal inflammation and is expected. Continues to have mild to moderate NG output - stable to improved. Relieved with draining G tube to gravity when experiencing pain/nausea.    Hyponatremia  Patient with down-trending sodium the week of 8/27; likely due to substantial NG output and low solute intake and hepatic dysfunction. Stable 127-128. Then week of 9/27 hyponatremia (120), likely due to GI losses, nephrology following. Per nephrology, this is related to his chronic illness, ESRD, and liver disease and apart from maintaining a 1.5L fluid restriction there is nothing to do for this. Of note, his hyponatremia began when his PEG tube was placed, unclear what this association may be.    Right Pancreatic Hydrothorax, stable  Increased dyspnea with CXR on 7/19 with further imaging and thoracentesis confirming a hepatic/pancreatic hydrothorax. Thoracentesis PRN for dyspnea, most recently 9/3.    ESRD due to ATN  Hyperphosphatemia  Hypokalemia  Stage III RADHA due to ATN from septic shock without recovery for >3 months. Line is tunneled dialysis catheter from [facility]. Nephrology dialyzed on Tu, Th, Sa schedule.  - Sevelemer powder TID with TF for hyperphosphatemia  -  "Scheduling 20mEq potassium through feeding tube daily     Anemia of chronic illness  Hematochezia, resolved  Hemoperitoneum  Baseline Hgb 17. Running between 6-8 in setting of chronic illness, new ESRD with epocrit per Nephrology, frequent lab draws and procedures. Hematochezia on 8/8 after starting standard intensity heparin, now resolved. Paracentesis on 8/21 with hemoperitoneum. No active bleeding. Required a unit of PRBC on 9/4 in the setting of several fluid boluses.   - Goal hgb > 7.0, trend Hgb     Alcohol Use Disorder  Counseled patient on complete alcohol cessation. Not interested in additional services at this time. Continue daily folic acid and thiamine.  - Ensure patient has resources for treatment at discharge if managing alcohol cessation independently becomes challenging.    Severe malnutrition of chronic illness  Poor oral intake in the setting of necrotizing pancreatitis. PEG tube placed 7/13, replaced 9/14. Continuous tube feeds with goal of 65 ml/hr with continuous pancreatic enzymes. Transitioning to renal formula.   - Continuous tube feeds  - Advancing diet as tolerated    Suspected sleep apnea  Patient requests supplemental oxygen at night as his friends have told him he may have sleep apnea and he feels that the oxygen helps him \"breathe lighter\". No hypoxia.  - Outpatient sleep study       Diet: Advance diet as tolerated  DVT Prophylaxis: SCDs  Rdz Catheter: Not present  Central Lines: None    Code Status: Full Code      Disposition Plan   Expected discharge: 7-8 days  recommended to long term care once adequate pain management/ tolerating PO medications and safe disposition plan/ TCU bed available     The patient's care was discussed with the Attending Physician, Dr. Tabares.    Zi Wood MD  PGY-2, Internal Medicine  ______________________________________________________________________    Interval History   NAEO. Patient reports he feels unchanged. No cough, no breathing issues. " States he requests oxygen at night as his friends have told him that he snores and that he sometimes has interruptions in his breathing following gasps. Also notes that his pain is not well controlled overnight and feels that his pain meds wear off after 3 hours but are scheduled q4h so he always has to wait and can't sleep.    Data reviewed today: I reviewed all medications, new labs and imaging results over the last 24 hours. I personally reviewed no images or EKG's today.    Physical Exam   Vital Signs: Temp: (!) 96.2  F (35.7  C) Temp src: Oral BP: (!) 85/31 Pulse: 92   Resp: 18 SpO2: 94 % O2 Device: Nasal cannula with humidification Oxygen Delivery: 6 LPM  Weight: 127 lbs 13.87 oz  General Appearance: chronically ill appearing, in no acute distress, more alert and interactive than previous  Respiratory: normal work of breathing on ambient air  Cardiovascular: regular rate  GI: Mildly tender to palpation,  Skin: dressings in place around G tube, no visible drainage or erythema  Other: Alert, conversant, moving extremities spontaneously

## 2021-10-04 ENCOUNTER — APPOINTMENT (OUTPATIENT)
Dept: PHYSICAL THERAPY | Facility: CLINIC | Age: 31
End: 2021-10-04
Attending: INTERNAL MEDICINE
Payer: MEDICAID

## 2021-10-04 LAB
ANION GAP SERPL CALCULATED.3IONS-SCNC: 11 MMOL/L (ref 3–14)
BUN SERPL-MCNC: 52 MG/DL (ref 7–30)
CALCIUM SERPL-MCNC: 9.6 MG/DL (ref 8.5–10.1)
CHLORIDE BLD-SCNC: 89 MMOL/L (ref 94–109)
CO2 SERPL-SCNC: 25 MMOL/L (ref 20–32)
CREAT SERPL-MCNC: 3.79 MG/DL (ref 0.66–1.25)
ERYTHROCYTE [DISTWIDTH] IN BLOOD BY AUTOMATED COUNT: 15.5 % (ref 10–15)
GFR SERPL CREATININE-BSD FRML MDRD: 20 ML/MIN/1.73M2
GLUCOSE BLD-MCNC: 138 MG/DL (ref 70–99)
HCT VFR BLD AUTO: 28 % (ref 40–53)
HGB BLD-MCNC: 9.3 G/DL (ref 13.3–17.7)
MCH RBC QN AUTO: 30.9 PG (ref 26.5–33)
MCHC RBC AUTO-ENTMCNC: 33.2 G/DL (ref 31.5–36.5)
MCV RBC AUTO: 93 FL (ref 78–100)
PLATELET # BLD AUTO: 267 10E3/UL (ref 150–450)
POTASSIUM BLD-SCNC: 3.1 MMOL/L (ref 3.4–5.3)
RBC # BLD AUTO: 3.01 10E6/UL (ref 4.4–5.9)
SODIUM SERPL-SCNC: 125 MMOL/L (ref 133–144)
WBC # BLD AUTO: 9 10E3/UL (ref 4–11)

## 2021-10-04 PROCEDURE — 120N000002 HC R&B MED SURG/OB UMMC

## 2021-10-04 PROCEDURE — 250N000013 HC RX MED GY IP 250 OP 250 PS 637: Performed by: INTERNAL MEDICINE

## 2021-10-04 PROCEDURE — 250N000013 HC RX MED GY IP 250 OP 250 PS 637

## 2021-10-04 PROCEDURE — 90937 HEMODIALYSIS REPEATED EVAL: CPT

## 2021-10-04 PROCEDURE — 258N000003 HC RX IP 258 OP 636: Performed by: CLINICAL NURSE SPECIALIST

## 2021-10-04 PROCEDURE — 250N000011 HC RX IP 250 OP 636: Performed by: HOSPITALIST

## 2021-10-04 PROCEDURE — 80048 BASIC METABOLIC PNL TOTAL CA: CPT | Performed by: CLINICAL NURSE SPECIALIST

## 2021-10-04 PROCEDURE — 634N000001 HC RX 634: Performed by: CLINICAL NURSE SPECIALIST

## 2021-10-04 PROCEDURE — 36415 COLL VENOUS BLD VENIPUNCTURE: CPT | Performed by: CLINICAL NURSE SPECIALIST

## 2021-10-04 PROCEDURE — 97110 THERAPEUTIC EXERCISES: CPT | Mod: GP

## 2021-10-04 PROCEDURE — 97530 THERAPEUTIC ACTIVITIES: CPT | Mod: GP

## 2021-10-04 PROCEDURE — G0463 HOSPITAL OUTPT CLINIC VISIT: HCPCS

## 2021-10-04 PROCEDURE — 99233 SBSQ HOSP IP/OBS HIGH 50: CPT | Performed by: INTERNAL MEDICINE

## 2021-10-04 PROCEDURE — 85027 COMPLETE CBC AUTOMATED: CPT | Performed by: CLINICAL NURSE SPECIALIST

## 2021-10-04 PROCEDURE — 250N000011 HC RX IP 250 OP 636: Performed by: INTERNAL MEDICINE

## 2021-10-04 PROCEDURE — 99232 SBSQ HOSP IP/OBS MODERATE 35: CPT | Mod: GC | Performed by: STUDENT IN AN ORGANIZED HEALTH CARE EDUCATION/TRAINING PROGRAM

## 2021-10-04 PROCEDURE — 99233 SBSQ HOSP IP/OBS HIGH 50: CPT | Mod: GC | Performed by: INTERNAL MEDICINE

## 2021-10-04 PROCEDURE — 250N000013 HC RX MED GY IP 250 OP 250 PS 637: Performed by: HOSPITALIST

## 2021-10-04 RX ORDER — ONDANSETRON 4 MG/1
4 TABLET, FILM COATED ORAL
Status: DISCONTINUED | OUTPATIENT
Start: 2021-10-04 | End: 2021-10-07

## 2021-10-04 RX ADMIN — SEVELAMER CARBONATE 1.6 G: 800 POWDER, FOR SUSPENSION ORAL at 20:37

## 2021-10-04 RX ADMIN — MIDODRINE HYDROCHLORIDE 15 MG: 5 TABLET ORAL at 14:28

## 2021-10-04 RX ADMIN — EPOETIN ALFA-EPBX 5000 UNITS: 10000 INJECTION, SOLUTION INTRAVENOUS; SUBCUTANEOUS at 12:57

## 2021-10-04 RX ADMIN — PANCRELIPASE 2 CAPSULE: 24000; 76000; 120000 CAPSULE, DELAYED RELEASE PELLETS ORAL at 17:22

## 2021-10-04 RX ADMIN — Medication: at 14:04

## 2021-10-04 RX ADMIN — THIAMINE HCL TAB 100 MG 100 MG: 100 TAB at 16:44

## 2021-10-04 RX ADMIN — ACETAMINOPHEN 325 MG: 325 SOLUTION ORAL at 16:40

## 2021-10-04 RX ADMIN — CALCIUM CARBONATE (ANTACID) CHEW TAB 500 MG 500 MG: 500 CHEW TAB at 10:57

## 2021-10-04 RX ADMIN — Medication 1 PACKET: at 16:55

## 2021-10-04 RX ADMIN — SEVELAMER CARBONATE 1.6 G: 800 POWDER, FOR SUSPENSION ORAL at 16:43

## 2021-10-04 RX ADMIN — Medication 5 ML: at 16:44

## 2021-10-04 RX ADMIN — HYDROMORPHONE HYDROCHLORIDE 1 MG: 1 SOLUTION ORAL at 23:02

## 2021-10-04 RX ADMIN — HYDROMORPHONE HYDROCHLORIDE 1 MG: 1 SOLUTION ORAL at 07:51

## 2021-10-04 RX ADMIN — SODIUM CHLORIDE 250 ML: 9 INJECTION, SOLUTION INTRAVENOUS at 12:56

## 2021-10-04 RX ADMIN — MIDODRINE HYDROCHLORIDE 15 MG: 5 TABLET ORAL at 07:51

## 2021-10-04 RX ADMIN — CYCLOBENZAPRINE 10 MG: 10 TABLET, FILM COATED ORAL at 10:57

## 2021-10-04 RX ADMIN — PANCRELIPASE 2 CAPSULE: 24000; 76000; 120000 CAPSULE, DELAYED RELEASE PELLETS ORAL at 00:39

## 2021-10-04 RX ADMIN — SODIUM BICARBONATE 325 MG: 325 TABLET ORAL at 12:04

## 2021-10-04 RX ADMIN — PROCHLORPERAZINE MALEATE 5 MG: 5 TABLET ORAL at 12:16

## 2021-10-04 RX ADMIN — HYDROMORPHONE HYDROCHLORIDE 1 MG: 1 SOLUTION ORAL at 14:25

## 2021-10-04 RX ADMIN — SEVELAMER CARBONATE 1.6 G: 800 POWDER, FOR SUSPENSION ORAL at 08:37

## 2021-10-04 RX ADMIN — SODIUM BICARBONATE 325 MG: 325 TABLET ORAL at 20:38

## 2021-10-04 RX ADMIN — METHYLPHENIDATE HYDROCHLORIDE 5 MG: 5 TABLET ORAL at 07:51

## 2021-10-04 RX ADMIN — SODIUM BICARBONATE 325 MG: 325 TABLET ORAL at 17:22

## 2021-10-04 RX ADMIN — ONDANSETRON HYDROCHLORIDE 4 MG: 4 TABLET, FILM COATED ORAL at 16:44

## 2021-10-04 RX ADMIN — SODIUM CHLORIDE 300 ML: 9 INJECTION, SOLUTION INTRAVENOUS at 12:55

## 2021-10-04 RX ADMIN — PANCRELIPASE 2 CAPSULE: 24000; 76000; 120000 CAPSULE, DELAYED RELEASE PELLETS ORAL at 07:03

## 2021-10-04 RX ADMIN — PANCRELIPASE 2 CAPSULE: 24000; 76000; 120000 CAPSULE, DELAYED RELEASE PELLETS ORAL at 12:04

## 2021-10-04 RX ADMIN — ONDANSETRON HYDROCHLORIDE 4 MG: 4 TABLET, FILM COATED ORAL at 21:54

## 2021-10-04 RX ADMIN — SODIUM BICARBONATE 325 MG: 325 TABLET ORAL at 00:39

## 2021-10-04 RX ADMIN — HYDROXYZINE HYDROCHLORIDE 50 MG: 25 TABLET, FILM COATED ORAL at 00:35

## 2021-10-04 RX ADMIN — ACETAMINOPHEN 325 MG: 325 SOLUTION ORAL at 08:36

## 2021-10-04 RX ADMIN — Medication 40 MG: at 07:51

## 2021-10-04 RX ADMIN — HYDROMORPHONE HYDROCHLORIDE 1 MG: 1 SOLUTION ORAL at 10:58

## 2021-10-04 RX ADMIN — HYDROMORPHONE HYDROCHLORIDE 1 MG: 1 SOLUTION ORAL at 16:39

## 2021-10-04 RX ADMIN — FOLIC ACID 1 MG: 1 TABLET ORAL at 16:44

## 2021-10-04 RX ADMIN — HYDROMORPHONE HYDROCHLORIDE 1 MG: 1 SOLUTION ORAL at 00:38

## 2021-10-04 RX ADMIN — Medication 6 MG: at 00:37

## 2021-10-04 RX ADMIN — HYDROMORPHONE HYDROCHLORIDE 1 MG: 1 SOLUTION ORAL at 03:28

## 2021-10-04 RX ADMIN — POTASSIUM CHLORIDE 20 MEQ: 1.5 POWDER, FOR SOLUTION ORAL at 07:51

## 2021-10-04 RX ADMIN — PROCHLORPERAZINE MALEATE 5 MG: 5 TABLET ORAL at 03:28

## 2021-10-04 RX ADMIN — HYDROXYZINE HYDROCHLORIDE 50 MG: 25 TABLET, FILM COATED ORAL at 10:57

## 2021-10-04 RX ADMIN — MIDODRINE HYDROCHLORIDE 15 MG: 5 TABLET ORAL at 20:38

## 2021-10-04 RX ADMIN — Medication 1 PACKET: at 20:37

## 2021-10-04 RX ADMIN — HYDROMORPHONE HYDROCHLORIDE 1 MG: 1 SOLUTION ORAL at 21:55

## 2021-10-04 RX ADMIN — Medication 1 PACKET: at 07:51

## 2021-10-04 RX ADMIN — PANCRELIPASE 2 CAPSULE: 24000; 76000; 120000 CAPSULE, DELAYED RELEASE PELLETS ORAL at 20:38

## 2021-10-04 RX ADMIN — METHYLPHENIDATE HYDROCHLORIDE 5 MG: 5 TABLET ORAL at 12:16

## 2021-10-04 RX ADMIN — SODIUM BICARBONATE 325 MG: 325 TABLET ORAL at 07:03

## 2021-10-04 RX ADMIN — HYDROMORPHONE HYDROCHLORIDE 1 MG: 1 SOLUTION ORAL at 07:05

## 2021-10-04 ASSESSMENT — MIFFLIN-ST. JEOR
SCORE: 1517.75
SCORE: 1527.75

## 2021-10-04 ASSESSMENT — ACTIVITIES OF DAILY LIVING (ADL)
ADLS_ACUITY_SCORE: 18
ADLS_ACUITY_SCORE: 21
ADLS_ACUITY_SCORE: 18

## 2021-10-04 ASSESSMENT — VISUAL ACUITY: OU: NORMAL ACUITY

## 2021-10-04 NOTE — CONSULTS
[facility] Nurse Inpatient Wound Assessment   Reason for consultation: Evaluate and treat Old GJ site wounds  9/22- New consult for future skin breakdown on current G-tube site  Assessment  abdominal wounds due to Surgical Wound  Status: healing, follow up  G-tube site- stable- no open area noted  Treatment Plan    Old GI tube site - every other day    Cleanse the area with NS and pat dry.    Apply No sting film barrier to periwound skin.    Cover wound with Mepilex.    Change dressing daily per GI team    G- tube site- Instill 10-20 ml of NS into the current gauze before removal. Let it sit for about 10 mins to soak the dressing and easy removal to minimize pain.  Gently remove the dressing and cleanse with NS, then pat dry.  Paint with no sting film barrier to protect skin, apply two layers of barrier.  Place one piece of fenestrated gauze.  Secure the tube.    Orders updated and spoke with RN   Recommended provider order: none  Paged out attending and nephrology team.   WO Nurse follow-up plan:weekly  Nursing to notify the Provider(s) and re-consult the [facility] Nurse if wound(s) deteriorates or new skin concern.    Patient History  According to provider note(s):  Dax Villareal is a 30 year old male with PMHx significant for alcohol use disorder who has had a prolonged hospitalization at [facility] in [place] for acute alcohol hepatitis/end stage liver disease complicated by hepatic encephalopathy, acute renal failure requiring hemodialysis, acute hypoxemic respiratory failure and septic shock secondary to acute necrotizing pancreatitis. Transferred to [facility] ICU on 6/28/2021 for further management of necrotizing pancreatitis by interventional GI, possible necrosectomy.    Objective Data  Containment of urine/stool: Incontinence Protocol    Active Diet Order  Orders Placed This Encounter      Dialysis Diet      Output:   I/O last 3 completed shifts:  In: -   Out: 850 [Emesis/NG output:850]    Risk Assessment:   Sensory  Perception: 3-->slightly limited  Moisture: 3-->occasionally moist  Activity: 1-->bedfast  Mobility: 2-->very limited  Nutrition: 3-->adequate  Friction and Shear: 1-->problem  Tyrel Score: 13                          Labs:   Recent Labs   Lab 10/02/21  1034 10/01/21  1052 10/01/21  1052   ALBUMIN  --   --  2.7*   HGB 8.9*   < > 9.0*   WBC 9.2   < > 10.0    < > = values in this interval not displayed.       Physical Exam  Areas of skin assessed: focused old G-tube site     Wound Location:  Right upper quadrant  Date of last photo none taken- pt declined.  Wound History: New G-tube site- Small bloody drainage noted. Blanchable erythema with minimal dry drainage. No open area.    Old G-tube closure site-       3x4x0.1 cm with minimal healed tissue with small dry bloody drainage on old dressing. Pain is much better compared to last week,       Interventions  Visual inspection and assessment completed   Wound Care Rationale Protect periwound skin, Provide protection  and Pain reduction  Wound Care: completed by RN  Supplies: floor stock and discussed with RN  Current off-loading measures: Pillows under calves and Pillows  Current support surface: Standard  Atmos Air mattress  Education provided to: plan of care, wound progress, Moisture management and Off-loading pressure   Discussed plan of care with Patient and Nurse    Deneen Matthews RN CWOCN

## 2021-10-04 NOTE — PROGRESS NOTES
Nephrology Progress Note  10/04/2021         Dax Villareal is a 30 year old male with h/o ETOH hepatitis, end stage liver disease, RADHA on HD, transferred from [facility] in [place] for septic shock on 6/28/21 for treatment of necrotizing pancreatitis and decompensated liver disease. He was initially admitted to [facility] 5/19/21 and started on RRT 5/26/2021.        Interval History :   Mr Villareal had necrosectomy for 4th time on 8/11 with no more anticipated.  Running HD today, ordered for 2L of UF today.  No major changes in Nephrology plan, PICC line is removed, can check his labs at beginning of HD runs on MWF (ordered to be drawn on dialysis).        Assessment & Recommendations:   ESRD-Cr was 0.8 as recently as 5/19/2021 but now HD dependent for 3 months, started RRT at [facility] prior to transfer to [facility] on 5/26.  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  continuing restorative cares for now, BP's have been stable enough to support HD.  Had GI stenting of pancreas x4.                 -Line is TDC from PTA               -Planning HD on MWF schedule, refuses extra runs, will continue to offer.       Volume status-Anuric, wt not reliable, pulling 2L of UF as BP's allow.     Electrolytes/pH-Na 125, runs chronically low, should correct some on run today.  K 3.1, and bicarb 25 no acute issues.      Ca/phos/pth-Ca 9.6, Mg 1.7, Phos 5.1 last check. On Sevelamer 1.6g TID, is on Ca carbonate as well and should come down with HD, following closely.                     Anemia-Hgb low at 9.3, acute management per team, last PRBC's 7/14.  On EPO 5k with runs, iron sats 43%      Nutrition-Taking PO, appetite poor lately, on Vital TF but having frequent nausea.    Seen and discussed with Dr Nogueira      Recommendations were communicated to primary team via note       SOFIA Marlow CNS  Clinical Nurse Specialist  [phone]    Review of  "Systems:   I reviewed the following systems:  Gen: No fevers or chills  CV: No CP at rest  Resp: No SOB at rest  GI: No N/V    Physical Exam:   I/O last 3 completed shifts:  In: -   Out: 850 [Emesis/NG output:850]   BP 90/44   Pulse 89   Temp (!) 95.6  F (35.3  C) (Oral)   Resp 16   Ht 1.676 m (5' 6\")   Wt 63 kg (138 lb 14.2 oz)   SpO2 96%   BMI 22.42 kg/m       GENERAL APPEARANCE: Lying in bed, in no distress.    EYES:  scleral icterus, pupils equal  Pulmonary: decreased BS   CV: tachy   - Edema - no LE edema  GI: mild distention, non-tender. + tenderness at G-tube site and generalized abdominal pain.    MS: no evidence of inflammation in joints, no muscle tenderness  SKIN: no rash, warm, dry, no cyanosis, + G tube  NEURO: alert/interactive  ACCESS: Right TDC with no induration or erythema    Labs:   All labs reviewed by me  Electrolytes/Renal - Recent Labs   Lab Test 10/04/21  1330 10/02/21  1358 10/01/21  1052 09/30/21 2013 09/30/21 2013 09/30/21  1054 09/30/21  1054 09/29/21  0311 09/28/21  1005   * 126* 125*  125*   < > 127*   < > 128*   < > 128*   POTASSIUM 3.1* 3.3* 3.7  3.7   < > 4.1   < > 2.9*   < > 3.1*   CHLORIDE 89* 92* 91*  91*   < > 93*   < > 92*   < > 92*   CO2 25 26 22  22   < > 25   < > 25   < > 23   BUN 52* 36* 51*  51*   < > 42*   < > 34*   < > 37*   CR 3.79* 3.27* 4.27*  4.27*   < > 3.58*   < > 3.13*   < > 3.17*   * 102* 127*  127*   < > 94   < > 114*   < > 130*   JANENE 9.6 9.8 10.3*  10.3*   < > 9.6   < > 9.5   < > 9.5   MAG  --   --  1.7  --  1.9  --  1.9   < > 1.8   PHOS  --   --  5.1*  --   --   --  4.3  --  4.4    < > = values in this interval not displayed.       CBC -   Recent Labs   Lab Test 10/04/21  1330 10/02/21  1034 10/01/21  1052   WBC 9.0 9.2 10.0   HGB 9.3* 8.9* 9.0*    239 266       LFTs -   Recent Labs   Lab Test 10/01/21  1052 09/28/21  1005 09/04/21  0414 09/03/21  0523 09/03/21  0523   ALKPHOS 96  --  81  --  95   BILITOTAL 0.9  --  1.4*  -- "  2.0*   ALT 17  --  29  --  31   AST 23  --  38  --  39   PROTTOTAL 8.7  --  8.8  --  9.2*   ALBUMIN 2.7* 2.5* 2.9*   < > 2.3*    < > = values in this interval not displayed.       Iron Panel -   Recent Labs   Lab Test 08/14/21 2055 07/19/21  0632   IRON 28* 31*   IRONSAT 21 43   JERRELL 2,186*  --            Current Medications:    sodium bicarbonate  325 mg Per Feeding Tube Q4H    And     amylase-lipase-protease  1-2 capsule Per Feeding Tube Q4H     amylase-lipase-protease  2 capsule Per G Tube TID w/meals     B and C vitamin Complex with folic acid  5 mL Per Feeding Tube Daily     fiber modular (NUTRISOURCE FIBER)  1 packet Per Feeding Tube TID     folic acid  1 mg Oral or Feeding Tube Daily     heparin lock flush  5-20 mL Intracatheter Q24H     HYDROmorphone (STANDARD CONC)  1 mg Oral TID     influenza quadrivalent (PF) vacc  0.5 mL Intramuscular Prior to discharge     melatonin  6 mg Oral or Feeding Tube At Bedtime     menthol   Transdermal Q8H     methylphenidate  5 mg Per Feeding Tube BID     midodrine  15 mg Per Feeding Tube TID     ondansetron  4 mg Per G Tube TID     pantoprazole  40 mg Oral or Feeding Tube QAM AC     potassium chloride  20 mEq Per Feeding Tube Daily     protein modular  1 packet Per Feeding Tube Daily     sevelamer carbonate (RENVELA)  1.6 g Oral or Feeding Tube TID     sodium chloride (PF)  3 mL Intracatheter Q8H     sodium chloride (PF)  9 mL Intracatheter During Dialysis/CRRT (from stock)     sodium chloride (PF)  9 mL Intracatheter During Dialysis/CRRT (from stock)     vitamin B1  100 mg Oral or Feeding Tube Daily       - MEDICATION INSTRUCTIONS -       dextrose       heparin (porcine) 1,000 Units/hr (08/24/21 0907)

## 2021-10-04 NOTE — PLAN OF CARE
Patient hypotensive at time and BP improves with reverse trendelenburg.  Dyspnea on exertion. Patient did not get out of bed this shift. Patient reports he plans to start getting more mobile as he intends to work on strengthening and rehab before moving in with friend and girlfriend. Patient had emesis x1. Given prn compazine x1 for emesis.  Pt reports  dilaudid q 3 prn shorten period between prn times more effective with breakthrough pain in between scheduled TID dilaudid pain regimen.  Patient reports atarax has helped with itching on abdomen, but patient tends to scratch at that area and intermittently request to remove dressing. Patient augmenting with prn atarax and flexeril as well. Patient has minimal sense of time and will call frequently for prns shortly after receiving medications.   Dialysis diet with 500 ml FR. Patient endorses difficulty with poor tolerance for food, Patient on continuous TF @ 45 ml/hr   via J tube. G tube to gravity with 125 ml output. Plan: Continue to monitor and follow and plan of care. Notify provide of changes.

## 2021-10-04 NOTE — PROGRESS NOTES
HEMODIALYSIS TREATMENT NOTE    Date: 10/4/2021  Time: 3:15 PM    Data:  Pre Wt: 62 kg (136 lb 11 oz)   Desired Wt: 61 kg   Post Wt: 62 kg (136 lb 11 oz)  Weight change: 0 kg  Ultrafiltration - Post Run Net Total Removed (mL): 0 mL  Vascular Access Status: CVC  patent  Dialyzer Rinse: Clear  Total Blood Volume Processed: 58.2  Total Dialysis (Treatment) Time: 3 hrs   Dialysate Bath: K 3, Ca 2.25  Heparin: None    Lab:   No    Interventions:  Midodrine and dilaudid given by PCN  Fluid goal titrated to support BP    Assessment:  Pt had intermittent hypotension which was relieved by midodrine and fluid goal titration. Pt received pain medication which he said helped. Pt otherwise stable. Handoff given to floor RN.     Plan:    Per renal        Writer assumed care of pt with 1 hour left of treatment     Please advise.

## 2021-10-04 NOTE — PROGRESS NOTES
Care Management Follow Up    Length of Stay (days): 98    Expected Discharge Date: 10/05/2021     Concerns to be Addressed: discharge planning  To LTACH  Patient plan of care discussed at interdisciplinary rounds: Yes    Anticipated Discharge Disposition: LTACH  Anticipated Discharge Services:  (New outpatient dialysis)  Anticipated Discharge DME: None  Education Provided on the Discharge Plan:  Yes  Patient/Family in Agreement with the Plan: yes    Referrals Placed by CM/SW:  [facility]      Additional Information:  [facility] following. They need to get clearance from their financial dept because when insurance is ran it shows term date of 11/30. Per [facility] [place] counselor this is only to the previous residing county and will reactivate as of 12/1 in new [facility]. [facility] has been updated but need ok on their end to accept.    RNCC to continue to follow and assist as needed.      ROB Blankenship RN, BSN  5B RN Care Coordinator  [phone] phone  [phone] pager    Weekend or Holiday Care Coordinator [phone] pager  Job Code [id number]

## 2021-10-04 NOTE — PLAN OF CARE
Care from 1500 - 1930  Status: Pt admitted with septic shock on 6/28/2021. Found to have necrotizing pancreatitis and decompensated liver disease  Vitals: VSS on 0-4L NC. Supplemental oxygen given for comfort  Neuros: AO4, strength 4/5 BUE, 3/5 BLE. Numbness to BLE  IV: R CVC for HD, no other access   Resp/trach: LS diminished at the bases  Diet: Dialysis diet with 1500 mL fluid restriction, poor PO. TF via PEGJ at goal of 45 mL/hr through J. G to gravity drainage. Zofran scheduled  Bowel status: Large BM this afternoon, soft  : Anuric on HD  Skin: Abd dressing CDI, tender. Skin flaky   Pain: Oral dilaudid and Tylenol given for pain with relief  Activity: Up with assist of 2, lift. Not OOB this shift, shifts weight independently  Plan: Continue to monitor and follow POC

## 2021-10-04 NOTE — PLAN OF CARE
Assumed cares: [phone]     Events: Emesis x1. Compazine given. Zofran switched to crushable form, pt unable to tolerate ODT. Pt brought to dialysis at 1pm.   VS: Asymptomic hypotension except in AM complaint of dizziness, on RA to 4L NC per pt request. Tachycardic.      GI/: Anuric, on hemodialysis. 1x BM. G tube to gravity drainage.   Nutrition: Poor appetite. J tube infusing TF @ 45mL/hr goal rate.   Skin: Abdominal dressing changed.   Pain: Pt c/o pain in abdomen w/ adequate relief from PRN flexeril, dilaudid via GJ tube. Pt requesting PRN atarax x1.      Plan: Dialysis MWF. Social work pursuing LTC placement

## 2021-10-04 NOTE — PROGRESS NOTES
[facility]    Progress Note - Daniela 3 Service  Date of Admission:  6/28/2021    Changes today:  - Medically ready for discharge to LTC, awaiting placement  - Continues to have asymptomatic hypotension (chronic, likely autonomic insufficiency component from chronic illness & prolonged hospitalization)  - Per nephrology, hypokalemia will be managed with 3x/week dialysis, hyponatremia and hypotension are related to chronic illness and liver disease, they say there is nothing to do apart from maintaining a 1.5L fluid restriction  - Social work pursuing LTC placement, medically ready for discharge    Assessment & Plan             Dax Villareal is a 30 year old man with hx of alcohol use disorder admitted on 6/28/2021 after recent prolonged admission at [facility] in [place] who has severe acute alcoholic hepatitis c/b HE, ESRD requiring HD, and malnutrition in the setting of acute necrotizing pancreatitis, s/p PEG-J placement on 7/13. He arrived with acute respiratory failure and septic shock that have resolved, and his secondary bacterial peritonitis has been appropriately treated. He requires continued management for ESRD, hepatic encephalopathy, infected pancreatic pseudocyst, nutritional support via PEG-J, and persistent right pleural effusion. Clinically improving after multiple necrosectomies. Increasing leukocytosis and hypotension on 9/2 prompting resumption of IV antibiotics - has been stable since that time. Repeat CT with interval improvement in fluid collections.    VRE, pseudomonas, and candida in wound culture  Obtained from around G-tube site during a rapid response on 9/28 for lactic acidosis (2.1). Unclear utility of a wound culture, however it is growing multiple drug resistant bacteria. Discussed with ID vesnabside physician, given complex case and history will place formal consult. Per ID, no treatment needed.  - No antibiotics  - ID signed  off    Asymptomatic hypotension   Hypovolemia  Patient with substantial daily NG output and poor PO. Also note significant weight loss over the past few weeks. He has needed intermittent boluses of normal saline for euvolemia. Have discussed with renal multiple times - please re-evaluate volume status vs dry weight as he is often hypotensive following fluid removal.  - Weight 78kg on 8/13. Then 55 kg, weight 68.5kg on 9/22. Concern for weight loss vs. targeting dry weight.  - Monitor vitals and output post dialysis, consider fluids prn.  - cont midodrine 15 TID with Nephrology    Necrotizing alcoholic pancreatitis w/ infected pseudocyst, improving  Hepatic Fluid collection, stable to improving  Secondary Bacterial Peritonitis 2/2 infected pseudocyst with VRE, improving  Septic Shock, resolved  Admitted to the [facility] ICU on 6/28/2021 from [facility] in [place] for septic shock due to necrotizing pancreatitis. CT on admit showing mature collection with enhancing wall measuring ~[id number]. IR placed perc drain 6/29. Repeat imaging 7/18 revealed walled off necrotic collection amenable to endoscopic transluminal drainage with cystgastrostomy stenting with necrosectomy on 7/21/21. Complete necrosectomy on 8/11. Antibiotics (cefepime and metronidazole) were discontinued on 8/25 after left flank drain removed, re-initiated in the setting of fever on 9/2. Favor abdominal etiology, repeat CT on 9/7 with interval improvement in fluid collections - completed 7d course of abx per ID with EOT 9/8.  - Will likely need intermittent antibiotics with fluid collections occasionally with bacterial growth moving forward as to be expected in nec panc  - Palliative care following for pain management and goals of care given overall illness    G-tube Fistula  Dax had been having increasing G-tube output and was found to have an erythematous indurated area next to his G-tube insertion. This was found to be his G-tube balloon pushing  through the skin. He underwent G-tube replacement on  9/13/21. His old G-tube site has since had large volume output requiring frequent wound care dressing changes.  - 9/20/21 - EGD to close fistula completed by GI  - Wound team consulted, follow their wound care recs    Periapical abscess: L 3rd maxillary molar  Dental Caries  - Dentistry consulted, and discussed with oral surgery  - Amoxicillin for 7d course (9/21-9/27)  - Dental extraction when stable to be transported (while inpatient) to [facility]    Gastric Outlet Obstruction  High G tube output due to the cystogastric connection/fluid drainage noted on 8/5. Per GI, possibly related to gastric outlet from severe abdominal inflammation and is expected. Continues to have mild to moderate NG output - stable to improved. Relieved with draining G tube to gravity when experiencing pain/nausea.    Hyponatremia  Patient with down-trending sodium the week of 8/27; likely due to substantial NG output and low solute intake and hepatic dysfunction. Stable 127-128. Then week of 9/27 hyponatremia (120), likely due to GI losses, nephrology following. Per nephrology, this is related to his chronic illness, ESRD, and liver disease and apart from maintaining a 1.5L fluid restriction there is nothing to do for this. Of note, his hyponatremia began when his PEG tube was placed, unclear what this association may be.    Right Pancreatic Hydrothorax, stable  Increased dyspnea with CXR on 7/19 with further imaging and thoracentesis confirming a hepatic/pancreatic hydrothorax. Thoracentesis PRN for dyspnea, most recently 9/3.    ESRD due to ATN  Hyperphosphatemia  Hypokalemia  Stage III RADHA due to ATN from septic shock without recovery for >3 months. Line is tunneled dialysis catheter from [facility]. Nephrology dialyzed on Tu, Th, Sa schedule.  - Sevelemer powder TID with TF for hyperphosphatemia  - Scheduling 20mEq potassium through feeding tube daily     Anemia of chronic  "illness  Hematochezia, resolved  Hemoperitoneum  Baseline Hgb 17. Running between 6-8 in setting of chronic illness, new ESRD with epocrit per Nephrology, frequent lab draws and procedures. Hematochezia on 8/8 after starting standard intensity heparin, now resolved. Paracentesis on 8/21 with hemoperitoneum. No active bleeding. Required a unit of PRBC on 9/4 in the setting of several fluid boluses.   - Goal hgb > 7.0, trend Hgb     Alcohol Use Disorder  Counseled patient on complete alcohol cessation. Not interested in additional services at this time. Continue daily folic acid and thiamine.  - Ensure patient has resources for treatment at discharge if managing alcohol cessation independently becomes challenging.    Severe malnutrition of chronic illness  Poor oral intake in the setting of necrotizing pancreatitis. PEG tube placed 7/13, replaced 9/14. Continuous tube feeds with goal of 65 ml/hr with continuous pancreatic enzymes. Transitioning to renal formula.  - Continuous tube feeds  - Advancing diet as tolerated    Suspected sleep apnea  Patient requests supplemental oxygen at night as his friends have told him he may have sleep apnea and he feels that the oxygen helps him \"breathe lighter\". No hypoxia.  - Outpatient sleep study       Diet: Advance diet as tolerated  DVT Prophylaxis: SCDs  Rdz Catheter: Not present  Central Lines: None    Code Status: Full Code      Disposition Plan   Expected discharge: 7-8 days  recommended to long term care once adequate pain management/ tolerating PO medications and safe disposition plan/ TCU bed available     The patient's care was discussed with the Attending Physician, Dr. Tabares.    Marleny Monk MD  PGY-1, Internal Medicine-Pediatrics  [facility]  ______________________________________________________________________    Interval History   NAEO. Patient reports his pain was much better controlled last night with the q3h hydromorphone. He notes he has continued to have " some vomiting but is still drinking fluids in addition to his tube feeds.    Data reviewed today: I reviewed all medications, new labs and imaging results over the last 24 hours. I personally reviewed no images or EKG's today.    Physical Exam   Vital Signs: Temp: (!) 95.6  F (35.3  C) Temp src: Oral BP: (!) 95/36 Pulse: 93   Resp: 18 SpO2: 95 % O2 Device: Nasal cannula Oxygen Delivery: 4 LPM  Weight: 138 lbs 14.24 oz  General Appearance: chronically ill appearing, in no acute distress, more alert and interactive than previous  Respiratory: normal work of breathing on ambient air  Cardiovascular: regular rate  GI: Mildly tender to palpation,  Skin: dressings in place around G tube, no visible drainage or erythema  Other: Alert, conversant, moving extremities spontaneously. Feet appear extremely arched and deconditioned.

## 2021-10-04 NOTE — PROGRESS NOTES
[facility]  Palliative Care Daily Progress Note       Recommendations & Counseling       Pain: Better controlled with hydromorphone q3hrs PRN and TID scheduled.     Continue hydromorphone 1mg TID and 1mg q3hrs PRN for now and will slowly taper as tolerates.     Likely foot pain and numbness is peripheral neuropathy from prolonged hospitalization / critical illness. Recommend starting gabapentin 100mg post dialysis only three times a week and would continue at this dose for at least a week to see if tolerating it and make sure it is not causing excessive sedation. If tolerating without problems, could consider titrating to effect up to 300 mg 3 times per week given after hemodialysis on dialysis day     Nausea: Dax is tolerating taking compazine via PEG; he does not like how Zofran makes him feel when he takes it orally / SL so he has been declining it. I switched zofran to per peg, TID     Goals of care: Dax's goals remain restorative.       Thank you for the opportunity to continue to participate in the care of this patient and family.  Please feel free to contact on-call palliative provider with any emergent needs.  We can be reached via team pager [phone] (answered 8-4:30 Monday-Friday); after-hours answering service ([phone]);     This note was initiated by Jason Mejía MS3, medical student. I personally overwrote and edited each section of this note to reflect my sole personal and direct involvement in the patient's care today.    I, Valentina Varela, was present with the medical student who participated in the service and in the documentation of the note.  I have verified the history and personally performed the physical exam and medical decision making.  I agree with the assessment and plan of care as documented in the note and have edited the note to reflect my medical decision making.  I personally reviewed vital signs, medications, labs and  imaging.     Thank you for the opportunity to continue to participate in the care of this patient and family.  Please feel free to contact on-call palliative provider with any emergent needs.  We can be reached via team pager [phone] (answered 8-4:30 Monday-Friday); after-hours answering service ([phone]);   Physician Attestation:   Total time spent was 40 minutes spent in reviewing record, patient examination and counseling, discussion with primary team and documentation. >50% of time was spent counseling and/or coordination of care regarding symptom management, plan of care.  Valentina Varela MD / Palliative Medicine / Pager [phone] / [facility] Inpatient Team Consult Pager [phone] (answered 8am-430pm M-F) - ok to text page via [employer] / After-Hours Answering Service [phone] / Palliative Clinic in the [facility] at the [facility] - [phone] (scheduling); [phone] (triage).     Assessments          Dax Villareal is a 30 year old man with history of alcohol dependence with a prolonged and complex hospitalization for acute alcoholic hepatitis and associated liver failure that has been complicated by RADHA requiring HD, hepatic encephalopathy, SBP causing septic shock, infected necrotizing pancreatitis requiring drainage and multiple necrosectomies, malnutrition requiring PEG-J leading to fistula s/p endoscopic suture gastroplexy, with continue difficulty with pain management and palliative medicine re-consulted to assist with pain management, nausea and goals of care.     Today, the patient was seen for:  acute alcoholic hepatitis and associated liver failure  RADHA requiring HD  Malnutrition  Complex pain   Nausea   support    Prognosis, Goals, or Advance Care Planning was addressed today with: Yes. Discussed with Dax that at some point he will be moving to a TCU. Dax is looking forward to this and is wondering how long he will have to stay at the TCU before he can go live with his  friend Josh. He hopes that he can just stay at the TCU for 2 weeks or so and then continue his rehab from his friend's house. There is some lack of insight into how weak he has become. Did attempt to manage Dax's expectations and say that we can't promise a duration and that his body needs to recover enough before he can leave the TCU.    Mood, coping, and/or meaning in the context of serious illness were addressed today: No.              Interval History:     Chart review/discussion with unit or clinical team members and discussion with patient:   From nursing note saw that Dax seemed to be losing track of time an was asking for pain meds 30 minutes or so after being given them.    Dax says that his back, abdomen, foot pain is much better controlled with hydromorphone rather than oxycodone. This weekend pain was not controlled as per notes with q4hrs PRN but now that switched to q3hrs PRN, pain controlled. He feels like he is getting better sleep as a result. No adjustment to hydromorphone will be made today.     Called RN and asked about how Dax is taking his Zofran and compazine. Dax tried taking Zofran sublingually and also through PEG but both did not make him feel well so he has started declining it. He gets compazine through PEG.    Asked Dax about why he was asking for meds more frequently. He says that he is losing track of time, and wanted to clarify that during these encounters he was not asking for pain medication but rather asking when the pain medications would be given. He denies being in pain when asking the clarifying question. Today Dax was laying on his side. He also is able to sit on the side of his bed. Thinking about taking a shower today. He was not able to do any of these things when pain was being controlled by oxycodone so it looks like the hydromorphone is able to provide sufficient pain relief. Nausea level remains the same and he does not feel like he needs additional medication  "for this. Observed physical therapy working with Dax. Dax does take a few minutes to go from supine to sitting on edge of bed. When changing to sitting position he does have nausea. Does have some worries about foot pain when attempting to stand. He requires 2-person assist in order to stand up and he requires continued assistance with standing. He is not full-weight bearing.    Key Palliative Symptoms:   Pain location: back, abdomen, foot  # Pain severity the last 12 hours: low  # Nausea severity the last 12 hours: low    Patient is on opioids: assessed and bowels ok/no needed changes to plan of care today.           Review of Systems:     Besides above, a complete 10+ ROS was reviewed and is unremarkable          Medications:     I have reviewed this patient's medication profile and medications during this hospitalization.    Noted meds:  Zofran 4mg q 8  protonix 40mg PO every day  Atarax 25-50mg TID PRN   Compazine 5mg PO q6 PRN  Hydromorphone 1 mg PO q8  Hydromorphone 1 mg PO q3 PRN  Tylenol PRN   Flexeril PRN            Physical Exam:   Vitals were reviewed  BP (!) 95/36 (BP Location: Left arm)   Pulse 93   Temp (!) 95.6  F (35.3  C) (Oral)   Resp 18   Ht 1.676 m (5' 6\")   Wt 63 kg (138 lb 14.2 oz)   SpO2 95%   BMI 22.42 kg/m    Gen: alert, appears chronically ill, in NAD  Eyes: Conjunctiva clear. Sclera anicteric.  HENT: NCAT; mucous membranes slightly dry  Resp: no increased work of breathing, speaking full sentences  Abd: soft, slightly distended, +BS  Msk: no gross deformity  Skin:  trace jaundice, warm and well perfursed  Neuro: A&O x 3; CN II-XII grossly intact;  Mental status/Psych:able to engage in conversation; sensorium intact           Data Reviewed:     Reviewed recent labs and pertinent imaging  Dax's eGFR for the past few days has ranged between 17-24. Considering Gabapentin for his foot pain - do not exceed 300 mg twice per day because of his kidney failure and low eGFR.          " CONSTITUTIONAL: No weakness, fevers or chills  EYES/ENT: No visual changes;  No vertigo or throat pain   NECK: No pain or stiffness  RESPIRATORY: No cough, wheezing, hemoptysis; No shortness of breath  CARDIOVASCULAR: No chest pain or palpitations  GASTROINTESTINAL: No abdominal or epigastric pain. No nausea, vomiting, or hematemesis; No diarrhea or constipation. No melena or hematochezia.  GENITOURINARY: No dysuria, frequency or hematuria  NEUROLOGICAL: No numbness or weakness  SKIN: Pain at the site of fistula,   All other review of systems is negative unless indicated above.

## 2021-10-05 ENCOUNTER — APPOINTMENT (OUTPATIENT)
Dept: OCCUPATIONAL THERAPY | Facility: CLINIC | Age: 31
End: 2021-10-05
Attending: INTERNAL MEDICINE
Payer: MEDICAID

## 2021-10-05 LAB — HOLD SPECIMEN: NORMAL

## 2021-10-05 PROCEDURE — 120N000002 HC R&B MED SURG/OB UMMC

## 2021-10-05 PROCEDURE — 250N000013 HC RX MED GY IP 250 OP 250 PS 637: Performed by: INTERNAL MEDICINE

## 2021-10-05 PROCEDURE — 99232 SBSQ HOSP IP/OBS MODERATE 35: CPT | Mod: GC | Performed by: INTERNAL MEDICINE

## 2021-10-05 PROCEDURE — 99207 PR CDG-MDM COMPONENT: MEETS MODERATE - DOWN CODED: CPT | Performed by: INTERNAL MEDICINE

## 2021-10-05 PROCEDURE — 250N000013 HC RX MED GY IP 250 OP 250 PS 637

## 2021-10-05 PROCEDURE — 97530 THERAPEUTIC ACTIVITIES: CPT | Mod: GO

## 2021-10-05 PROCEDURE — 250N000013 HC RX MED GY IP 250 OP 250 PS 637: Performed by: HOSPITALIST

## 2021-10-05 PROCEDURE — 250N000011 HC RX IP 250 OP 636: Performed by: INTERNAL MEDICINE

## 2021-10-05 RX ADMIN — HYDROMORPHONE HYDROCHLORIDE 1 MG: 1 SOLUTION ORAL at 14:31

## 2021-10-05 RX ADMIN — ONDANSETRON HYDROCHLORIDE 4 MG: 4 TABLET, FILM COATED ORAL at 09:54

## 2021-10-05 RX ADMIN — SODIUM BICARBONATE 325 MG: 325 TABLET ORAL at 16:06

## 2021-10-05 RX ADMIN — PANCRELIPASE 2 CAPSULE: 24000; 76000; 120000 CAPSULE, DELAYED RELEASE PELLETS ORAL at 00:24

## 2021-10-05 RX ADMIN — SEVELAMER CARBONATE 1.6 G: 800 POWDER, FOR SUSPENSION ORAL at 18:20

## 2021-10-05 RX ADMIN — HYDROXYZINE HYDROCHLORIDE 50 MG: 25 TABLET, FILM COATED ORAL at 23:50

## 2021-10-05 RX ADMIN — MIDODRINE HYDROCHLORIDE 15 MG: 5 TABLET ORAL at 09:54

## 2021-10-05 RX ADMIN — SEVELAMER CARBONATE 1.6 G: 800 POWDER, FOR SUSPENSION ORAL at 21:46

## 2021-10-05 RX ADMIN — METHYLPHENIDATE HYDROCHLORIDE 5 MG: 5 TABLET ORAL at 12:46

## 2021-10-05 RX ADMIN — PROCHLORPERAZINE MALEATE 5 MG: 5 TABLET ORAL at 19:13

## 2021-10-05 RX ADMIN — HYDROMORPHONE HYDROCHLORIDE 1 MG: 1 SOLUTION ORAL at 03:38

## 2021-10-05 RX ADMIN — PROCHLORPERAZINE MALEATE 5 MG: 5 TABLET ORAL at 10:19

## 2021-10-05 RX ADMIN — PANCRELIPASE 1 CAPSULE: 24000; 76000; 120000 CAPSULE, DELAYED RELEASE PELLETS ORAL at 12:45

## 2021-10-05 RX ADMIN — ACETAMINOPHEN 325 MG: 325 SOLUTION ORAL at 21:47

## 2021-10-05 RX ADMIN — SODIUM BICARBONATE 325 MG: 325 TABLET ORAL at 12:46

## 2021-10-05 RX ADMIN — Medication 40 MG: at 09:57

## 2021-10-05 RX ADMIN — Medication 1 PACKET: at 20:44

## 2021-10-05 RX ADMIN — PANCRELIPASE 2 CAPSULE: 24000; 76000; 120000 CAPSULE, DELAYED RELEASE PELLETS ORAL at 20:44

## 2021-10-05 RX ADMIN — HYDROMORPHONE HYDROCHLORIDE 1 MG: 1 SOLUTION ORAL at 09:49

## 2021-10-05 RX ADMIN — MIDODRINE HYDROCHLORIDE 15 MG: 5 TABLET ORAL at 14:31

## 2021-10-05 RX ADMIN — FOLIC ACID 1 MG: 1 TABLET ORAL at 09:54

## 2021-10-05 RX ADMIN — Medication 1 PACKET: at 10:00

## 2021-10-05 RX ADMIN — SODIUM BICARBONATE 325 MG: 325 TABLET ORAL at 09:55

## 2021-10-05 RX ADMIN — PANCRELIPASE 2 CAPSULE: 24000; 76000; 120000 CAPSULE, DELAYED RELEASE PELLETS ORAL at 17:24

## 2021-10-05 RX ADMIN — HYDROMORPHONE HYDROCHLORIDE 1 MG: 1 SOLUTION ORAL at 19:12

## 2021-10-05 RX ADMIN — ACETAMINOPHEN 325 MG: 325 SOLUTION ORAL at 11:51

## 2021-10-05 RX ADMIN — THIAMINE HCL TAB 100 MG 100 MG: 100 TAB at 09:54

## 2021-10-05 RX ADMIN — SEVELAMER CARBONATE 1.6 G: 800 POWDER, FOR SUSPENSION ORAL at 09:56

## 2021-10-05 RX ADMIN — Medication 1 PACKET: at 16:03

## 2021-10-05 RX ADMIN — POTASSIUM CHLORIDE 20 MEQ: 1.5 POWDER, FOR SOLUTION ORAL at 09:56

## 2021-10-05 RX ADMIN — MIDODRINE HYDROCHLORIDE 15 MG: 5 TABLET ORAL at 20:44

## 2021-10-05 RX ADMIN — PANCRELIPASE 2 CAPSULE: 24000; 76000; 120000 CAPSULE, DELAYED RELEASE PELLETS ORAL at 05:05

## 2021-10-05 RX ADMIN — PANCRELIPASE 2 CAPSULE: 24000; 76000; 120000 CAPSULE, DELAYED RELEASE PELLETS ORAL at 09:48

## 2021-10-05 RX ADMIN — Medication 5 ML: at 09:57

## 2021-10-05 RX ADMIN — SODIUM BICARBONATE 325 MG: 325 TABLET ORAL at 05:05

## 2021-10-05 RX ADMIN — HYDROMORPHONE HYDROCHLORIDE 1 MG: 1 SOLUTION ORAL at 21:47

## 2021-10-05 RX ADMIN — SODIUM BICARBONATE 325 MG: 325 TABLET ORAL at 20:44

## 2021-10-05 RX ADMIN — ONDANSETRON HYDROCHLORIDE 4 MG: 4 TABLET, FILM COATED ORAL at 14:31

## 2021-10-05 RX ADMIN — HYDROMORPHONE HYDROCHLORIDE 1 MG: 1 SOLUTION ORAL at 16:04

## 2021-10-05 RX ADMIN — CYCLOBENZAPRINE 10 MG: 10 TABLET, FILM COATED ORAL at 10:11

## 2021-10-05 RX ADMIN — Medication 6 MG: at 00:24

## 2021-10-05 RX ADMIN — SODIUM BICARBONATE 325 MG: 325 TABLET ORAL at 00:24

## 2021-10-05 RX ADMIN — HYDROMORPHONE HYDROCHLORIDE 1 MG: 1 SOLUTION ORAL at 06:48

## 2021-10-05 RX ADMIN — METHYLPHENIDATE HYDROCHLORIDE 5 MG: 5 TABLET ORAL at 09:54

## 2021-10-05 RX ADMIN — HYDROMORPHONE HYDROCHLORIDE 1 MG: 1 SOLUTION ORAL at 23:02

## 2021-10-05 RX ADMIN — HYDROMORPHONE HYDROCHLORIDE 1 MG: 1 SOLUTION ORAL at 12:46

## 2021-10-05 RX ADMIN — HYDROXYZINE HYDROCHLORIDE 50 MG: 25 TABLET, FILM COATED ORAL at 16:04

## 2021-10-05 ASSESSMENT — MIFFLIN-ST. JEOR
SCORE: 1529.75
SCORE: 1522.75

## 2021-10-05 ASSESSMENT — ACTIVITIES OF DAILY LIVING (ADL)
ADLS_ACUITY_SCORE: 17
ADLS_ACUITY_SCORE: 21
ADLS_ACUITY_SCORE: 17
ADLS_ACUITY_SCORE: 21

## 2021-10-05 NOTE — PLAN OF CARE
19:30--23:00    Temp: (!) 95.8  F (35.4  C) Temp src: Axillary BP: 95/40 Pulse: 93   Resp: 16 SpO2: 93 % O2 Device: Nasal cannula Oxygen Delivery: 4 LPM     -gets scheduled Dilaudid oral solution for generalized pain  -tube feeding at 45 ml/hr via J tube  -G tube to gravity  -had a BM this evening, uses a bedpan  -anuric on hemodialysis  -on 4 liters O2 for comfort  -CVC saline locked  -lethargic    Continue with plan of care

## 2021-10-05 NOTE — PROGRESS NOTES
Care Management Follow Up    Length of Stay (days): 99    Expected Discharge Date: TBD   Concerns to be Addressed: discharge planning  To LTACH   Patient plan of care discussed at interdisciplinary rounds: Yes    Anticipated Discharge Disposition: LTACH - [facility]  Anticipated Discharge DME: None  Education Provided on the Discharge Plan:  Yes  Patient/Family in Agreement with the Plan: yes    Additional Information:  RNCC spoke with Rahul at [facility]. They have verified that [facility] insurance coverage will  on 12/01/2021. Insurance concerns resolved. Patient is on [facility] waiting list aprx 8-9 out of 12. SUSAN later this week. RNCC to follow up Thursday if no update before then.    RNCC to continue to follow and assist as needed.    ROB Blankenship RN, BSN  5B RN Care Coordinator  [phone] phone  [phone] pager    Weekend or Holiday Care Coordinator [phone] pager  Job Code [id number]

## 2021-10-05 NOTE — PLAN OF CARE
"Assumed cares 2300 - 0700    BP 95/40 (BP Location: Left arm)   Pulse 93   Temp (!) 95.8  F (35.4  C) (Axillary)   Resp 16   Ht 1.676 m (5' 6\")   Wt 62 kg (136 lb 11 oz)   SpO2 93%   BMI 22.06 kg/m      A&Ox4. VSS on 4 L NC for comfort per pt request ex hypotensive. Endorsed pain in abdomen controlled with dilaudid. Dyspnea on exertion. Denied N/V. Assist x1 with turns. Dialysis diet w/ a 1500 ml FR, poor appetite. G tube to gravity drainage bag, J tube TF @ 45 ml/hr. Old tube site dressing CDI. No IV access, MD aware.  "

## 2021-10-05 NOTE — PROGRESS NOTES
CLINICAL NUTRITION SERVICES - REASSESSMENT NOTE     Nutrition Prescription    RECOMMENDATIONS FOR MDs/PROVIDERS TO ORDER:  Patient is requesting an upgrade in diet to regular. Unhappy with dialysis diet.     Malnutrition Status:    Severe malnutrition in the context of acute on chronic illness     Recommendations already ordered by Registered Dietitian (RD):  No changes to renal TF formula. Tolerating well per patient     Future/Additional Recommendations:  GI status, TF tolerance        EVALUATION OF THE PROGRESS TOWARD GOALS   Diet:   Diet upgraded to dialysis on 9/28    Nutrition Support: ( on TF since 6/30 admit)  Access: Jejunostomy tube of the PEG/J replaced 9/13/21 (G-tube open to gravity at all times).   Dosing wt: 65 kg more consistent wt this admit on 9/18/21     EN: TF Switched to renal formula last week (Novasource Renal @ 45 ml/hr)  --Novasource Renal @ 45 ml/hr (1080 ml) provides 2160 kcal (33 kcal/kg),  98 gm pro (1.5 gm/kg ), 198 gm CHO, 108 gm fat, 774 ml free water, and 0 gm fiber daily.      Modules:   --Protein modules: Prosource TF free 1 packet daily via FT ( 90 kcal, 15 gm protein)  --PERT:  Creon 24, 2 capsules + 325 mg bicarb solution, Q 4 hours via FT. Providing 2667 units lipase/total gm fat in tube feeds.   --Fiber: Continue with Nutrisource fiber 1 packet TID      FWF: 25 ml/hr via feed and flush = 600 ml daily FWF      Intake:   PO: visited with patient today. Patient reports not eating any solids, however likes to drink sodas. Patient is requesting an upgrade in diet to regular. Unhappy with dialysis diet.     EN:  On TFs @ 45 ml/hr. G-tube to gravity and J-tube for TFs. Abdomen fistula (with sutures). Has intermittent nausea in which, patient hold tube feeds.     Average 7 day of EN intake: unable to assess due to daily TF volume is not being recorded.       ASSESSED NUTRITION NEEDS per 65 kg wt on 9/18  Estimated Energy Needs: 1950- 2275 kcals/day, (30-35 kcals/kg)   Justification:  Hypermetabolism with chronic illness, ( pancreatitis/HD/ESLD)  Estimated Protein Needs: 98++ g/day (1.5 ++ g/kg)   Justification: Pancreatitis and Hypercatabolism with critical illness / HD repletion,    Estimated Fluid Needs: Per provider pending fluid status (ESLD/HD/Anuric)       FINDINGS   Chart reviewed:   -ESLD with HE and HRS, requiring HD ( has been on HD since 5/26/21),  necrotizing pancreatitis/infected pseudocyst, s/p PEG-J placement on 7/13/21.     --Admitted secondary to SBP (treated)  --ESRD ( Anuric and on HD)  --HE, Mental status improved with aggressive lactulose and rifaximin, which was discontinued.    --Necrotizing pancreatitis with infected pancreatic pseudocyst s/p RP Drain and Cystogastrostomy, and persistent right pleural effusion. Had GI stenting of pancreas x4. Multiple Necrosectomies, last one on 8/11. 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 Patient has large fistula with draining from the old G-tube site and underwent endoscopic closure (9/20/21). No leakage post suture. Diet advanced on 9/29.     WT trend:   Wt's are unreliable. Per discussion with nursing staff, likely due to bed wt not being placed at zero. EDW is not available.   --Pre HD run weight 63.2 kg (139 lb 5.3 oz) today on 10/5/21.   - Admission weight 80.2 kg (6/28/21).   - EDW not available.   --Significant wt loss since admit per wt trend review. Patient with substantial high daily g-tube output due to GOO and frequent hold on TF support with nausea and during HD runs.     Meds:  Sodium Bicarbonate/Creon with TFs, Creon with meals,   Nephronex 5 ml daily per FT, calcium carbonate 500 mg daily, Sevelamer carbonate 0.8 gm packet TID PO  Remains on  thiamine, Folate.    Labs:   CRP: 47 (9/26), WBC: 9.0(10/4)  Fecal elastase: 162  (L) on    BUN:64, Cr: 4.77, Na+: 122(L)-> On HD ->   -> (K+:3.3(L), Phos:5.1 ( on 10/1) -> on renal TFs    Bicarb: 25 (normal range)   Total bili:0.9   B (elevated)     GI:  Off Lactulose/Rifaxamin.   Having 1-4 stool intermittently. On Nutrisource fiber + PERT  Not on any anti motility agents     MALNUTRITION  % Intake: On TF's with frequent interruptions, <75%>1 month - Severe   % Weight Loss: 21% net wt loss since admit ~ 3 month   Subcutaneous Fat Loss: Upper arms and legs severe   Muscle Loss: severe upper arms and legs   Fluid Accumulation/Edema: 1+ Generalized edema ( trace), 0 BLE   Malnutrition Diagnosis: Severe malnutrition in the context of Chronic condition     Previous Goals   Total avg nutritional intake to meet a minimum of 30 kcal/kg and 1.5 gm PRO/kg daily (per dosing wt 65 kg wt on 21 ).  Evaluation: unable to assess     Previous Nutrition Diagnosis  Inadequate energy/protein intake likely associated with daily interruption to TF support, (TF frequently on hold with HD and with nausea) with increase estimated kcal/protein needs associated with increased metabolic demand with chronic illness,  not meeting daily goal TF volume as evidenced by average 7 days TF met 70 % of goal TF volume and PO remained minimal   Evaluation: No change    CURRENT NUTRITION DIAGNOSIS  Inadequate oral intake related to anorexia as evidence by reliant on TF support to provide for full nutrition needs     INTERVENTIONS  Implementation  Enteral Nutrition:No changes to current feeds     Goals  Total avg nutritional intake to meet a minimum of 30 kcal/kg and 1.5 gm PRO/kg daily (per dosing wt 65 kg wt on 21 ).    Monitoring/Evaluation  Progress toward goals will be monitored and evaluated per protocol.      Jesica Blunt RD/MARYELLEN  Pager [phone]

## 2021-10-05 NOTE — PLAN OF CARE
Cares 1507-2317. Alert, oriented. VSS on 4 L O2 via NC for comfort, satting fine on RA. Up with assist x2 to chair with PT today. G tube to gravity with good output, J tube with continuous TF running @ 45 mL/hr which is goal. Pt had one small emesis this shift, took sched and PRN antiemetics with relief. Endorsed abdominal pain, gave sched and PRN dilaudid with relief. No IV access, MDs aware. Discharge pending placement at facility.

## 2021-10-05 NOTE — PROGRESS NOTES
[facility]    Progress Note - Daniela 3 Service  Date of Admission:  6/28/2021    Changes today:  - Medically ready for discharge to LTC, awaiting placement  - Continues to have asymptomatic hypotension (chronic, likely autonomic insufficiency component from chronic illness & prolonged hospitalization)  - At LTC, if asymptomatic hypotension without clinical changes, further hospitalization would not be required.    Assessment & Plan             Dax Villareal is a 30 year old man with hx of alcohol use disorder admitted on 6/28/2021 after recent prolonged admission at [facility] who has severe acute alcoholic hepatitis c/b HE, ESRD requiring HD, and malnutrition in the setting of acute necrotizing pancreatitis, s/p PEG-J placement on 7/13. He arrived with acute respiratory failure and septic shock that have resolved, and his secondary bacterial peritonitis has been appropriately treated. He requires continued management for ESRD, hepatic encephalopathy, infected pancreatic pseudocyst, nutritional support via PEG-J, and persistent right pleural effusion. Clinically improving after multiple necrosectomies. Increasing leukocytosis and hypotension on 9/2 prompting resumption of IV antibiotics - has been stable since that time. Repeat CT with interval improvement in fluid collections.    VRE, pseudomonas, and candida in wound culture  Obtained from around G-tube site during a rapid response on 9/28 for lactic acidosis (2.1). Unclear utility of a wound culture, however it is growing multiple drug resistant bacteria. Discussed with ID curbside physician, given complex case and history will place formal consult. Per ID, no treatment needed.  - No antibiotics  - ID signed off    Asymptomatic hypotension   Hypovolemia  Patient with substantial daily NG output and poor PO. Also note significant weight loss over the past few weeks. He has needed intermittent boluses of normal  saline for euvolemia. Have discussed with renal multiple times - please re-evaluate volume status vs dry weight as he is often hypotensive following fluid removal.  - Weight 78kg on 8/13. Then 55 kg, weight 68.5kg on 9/22. Concern for weight loss vs. targeting dry weight.  - Monitor vitals and output post dialysis, consider fluids prn.  - cont midodrine 15 TID with Nephrology    Necrotizing alcoholic pancreatitis w/ infected pseudocyst, improving  Hepatic Fluid collection, stable to improving  Secondary Bacterial Peritonitis 2/2 infected pseudocyst with VRE, improving  Septic Shock, resolved  Admitted to the [facility] ICU on 6/28/2021 from [facility] in [place] for septic shock due to necrotizing pancreatitis. CT on admit showing mature collection with enhancing wall measuring ~[id number]. IR placed perc drain 6/29. Repeat imaging 7/18 revealed walled off necrotic collection amenable to endoscopic transluminal drainage with cystgastrostomy stenting with necrosectomy on 7/21/21. Complete necrosectomy on 8/11. Antibiotics (cefepime and metronidazole) were discontinued on 8/25 after left flank drain removed, re-initiated in the setting of fever on 9/2. Favor abdominal etiology, repeat CT on 9/7 with interval improvement in fluid collections - completed 7d course of abx per ID with EOT 9/8. Nausea & vomiting with PO intake is likely 2/2 complications from pancreatitis (gastric outlet obstruction vs duodenal stricture), patient is not a candidate for whipple at this time and is already on the alternative therapy which is g-tube decompression and GJ feeds. If overall health improves, could follow up with GI as an outpatient to consider whipple in future.  - Will likely need intermittent antibiotics with fluid collections occasionally with bacterial growth moving forward as to be expected in nec panc  - Palliative care following for pain management and goals of care given overall illness    G-tube Fistula  Dax had been  having increasing G-tube output and was found to have an erythematous indurated area next to his G-tube insertion. This was found to be his G-tube balloon pushing through the skin. He underwent G-tube replacement on  9/13/21. His old G-tube site has since had large volume output requiring frequent wound care dressing changes.  - 9/20/21 - EGD to close fistula completed by GI  - Wound team consulted, follow their wound care recs    Periapical abscess: L 3rd maxillary molar  Dental Caries  - Dentistry consulted, and discussed with oral surgery  - Amoxicillin for 7d course (9/21-9/27)  - Dental extraction when stable to be transported (while inpatient) to [facility]    Gastric Outlet Obstruction  High G tube output due to the cystogastric connection/fluid drainage noted on 8/5. Per GI, possibly related to gastric outlet from severe abdominal inflammation and is expected. Continues to have mild to moderate NG output - stable to improved. Relieved with draining G tube to gravity when experiencing pain/nausea.    Hyponatremia  Patient with down-trending sodium the week of 8/27; likely due to substantial NG output and low solute intake and hepatic dysfunction. Stable 127-128. Then week of 9/27 hyponatremia (120), likely due to GI losses, nephrology following. Per nephrology, this is related to his chronic illness, ESRD, and liver disease and apart from maintaining a 1.5L fluid restriction there is nothing to do for this. Of note, his hyponatremia began when his PEG tube was placed, unclear what this association may be.    Right Pancreatic Hydrothorax, stable  Increased dyspnea with CXR on 7/19 with further imaging and thoracentesis confirming a hepatic/pancreatic hydrothorax. Thoracentesis PRN for dyspnea, most recently 9/3.    ESRD due to ATN  Hyperphosphatemia  Hypokalemia  Stage III RADHA due to ATN from septic shock without recovery for >3 months. Line is tunneled dialysis catheter from [facility]. Nephrology  "dialyzed on Tu, Th, Sa schedule.  - Sevelemer powder TID with TF for hyperphosphatemia  - Scheduling 20mEq potassium through feeding tube daily     Anemia of chronic illness  Hematochezia, resolved  Hemoperitoneum  Baseline Hgb 17. Running between 6-8 in setting of chronic illness, new ESRD with epocrit per Nephrology, frequent lab draws and procedures. Hematochezia on 8/8 after starting standard intensity heparin, now resolved. Paracentesis on 8/21 with hemoperitoneum. No active bleeding. Required a unit of PRBC on 9/4 in the setting of several fluid boluses.   - Goal hgb > 7.0, trend Hgb     Alcohol Use Disorder  Counseled patient on complete alcohol cessation. Not interested in additional services at this time. Continue daily folic acid and thiamine.  - Ensure patient has resources for treatment at discharge if managing alcohol cessation independently becomes challenging.    Severe malnutrition of chronic illness  Poor oral intake in the setting of necrotizing pancreatitis. PEG tube placed 7/13, replaced 9/14. Continuous tube feeds with goal of 65 ml/hr with continuous pancreatic enzymes. Transitioning to renal formula.  - Continuous tube feeds  - Advancing diet as tolerated    Suspected sleep apnea  Patient requests supplemental oxygen at night as his friends have told him he may have sleep apnea and he feels that the oxygen helps him \"breathe lighter\". No hypoxia.  - Outpatient sleep study       Diet: Advance diet as tolerated  DVT Prophylaxis: SCDs  Rdz Catheter: Not present  Central Lines: None    Code Status: Full Code      Disposition Plan   Expected discharge: 7-8 days  recommended to long term care once adequate pain management/ tolerating PO medications and safe disposition plan/ TCU bed available     The patient's care was discussed with the Attending Physician, Dr. Yu.    Marleny Monk MD  PGY-1, Internal Medicine-Pediatrics  [facility] " "3  ______________________________________________________________________    Interval History   NAEO. No new pain or concerns today. Did have some \"drama\" with his mom yesterday so decided to remove her from his list of alternative decision makers (just keeping his sister).    Data reviewed today: I reviewed all medications, new labs and imaging results over the last 24 hours. I personally reviewed no images or EKG's today.    Physical Exam   Vital Signs: Temp: 96.8  F (36  C) Temp src: Oral BP: (!) 91/36 Pulse: 95   Resp: 18 SpO2: 93 % O2 Device: None (Room air) Oxygen Delivery: 4 LPM  Weight: 136 lbs 10.96 oz  General Appearance: chronically ill appearing, in no acute distress, more alert and interactive than previous  Respiratory: normal work of breathing on ambient air  Cardiovascular: regular rate  GI: Mildly tender to palpation  Skin: dressings in place around G tube, no visible drainage or erythema  Other: Alert, conversant, moving extremities spontaneously. Feet appear contracted and deconditioned.  "

## 2021-10-06 LAB
ANION GAP SERPL CALCULATED.3IONS-SCNC: 17 MMOL/L (ref 3–14)
BUN SERPL-MCNC: 64 MG/DL (ref 7–30)
CALCIUM SERPL-MCNC: 10.5 MG/DL (ref 8.5–10.1)
CHLORIDE BLD-SCNC: 84 MMOL/L (ref 94–109)
CO2 SERPL-SCNC: 21 MMOL/L (ref 20–32)
CREAT SERPL-MCNC: 4.77 MG/DL (ref 0.66–1.25)
ERYTHROCYTE [DISTWIDTH] IN BLOOD BY AUTOMATED COUNT: 15.4 % (ref 10–15)
GFR SERPL CREATININE-BSD FRML MDRD: 15 ML/MIN/1.73M2
GLUCOSE BLD-MCNC: 108 MG/DL (ref 70–99)
HBV SURFACE AG SERPL QL IA: NONREACTIVE
HCT VFR BLD AUTO: 26.4 % (ref 40–53)
HGB BLD-MCNC: 8.7 G/DL (ref 13.3–17.7)
MCH RBC QN AUTO: 30.1 PG (ref 26.5–33)
MCHC RBC AUTO-ENTMCNC: 33 G/DL (ref 31.5–36.5)
MCV RBC AUTO: 91 FL (ref 78–100)
PHOSPHATE SERPL-MCNC: 6.3 MG/DL (ref 2.5–4.5)
PLATELET # BLD AUTO: 239 10E3/UL (ref 150–450)
POTASSIUM BLD-SCNC: 3.3 MMOL/L (ref 3.4–5.3)
RBC # BLD AUTO: 2.89 10E6/UL (ref 4.4–5.9)
SODIUM SERPL-SCNC: 122 MMOL/L (ref 133–144)
WBC # BLD AUTO: 7.1 10E3/UL (ref 4–11)

## 2021-10-06 PROCEDURE — 250N000011 HC RX IP 250 OP 636: Performed by: INTERNAL MEDICINE

## 2021-10-06 PROCEDURE — 250N000013 HC RX MED GY IP 250 OP 250 PS 637: Performed by: INTERNAL MEDICINE

## 2021-10-06 PROCEDURE — 250N000013 HC RX MED GY IP 250 OP 250 PS 637

## 2021-10-06 PROCEDURE — 99232 SBSQ HOSP IP/OBS MODERATE 35: CPT | Mod: GC | Performed by: INTERNAL MEDICINE

## 2021-10-06 PROCEDURE — 634N000001 HC RX 634: Performed by: CLINICAL NURSE SPECIALIST

## 2021-10-06 PROCEDURE — 120N000002 HC R&B MED SURG/OB UMMC

## 2021-10-06 PROCEDURE — 99232 SBSQ HOSP IP/OBS MODERATE 35: CPT | Performed by: CLINICAL NURSE SPECIALIST

## 2021-10-06 PROCEDURE — 86706 HEP B SURFACE ANTIBODY: CPT | Performed by: CLINICAL NURSE SPECIALIST

## 2021-10-06 PROCEDURE — 250N000013 HC RX MED GY IP 250 OP 250 PS 637: Performed by: HOSPITALIST

## 2021-10-06 PROCEDURE — 87340 HEPATITIS B SURFACE AG IA: CPT | Performed by: CLINICAL NURSE SPECIALIST

## 2021-10-06 PROCEDURE — 85027 COMPLETE CBC AUTOMATED: CPT | Performed by: CLINICAL NURSE SPECIALIST

## 2021-10-06 PROCEDURE — 36415 COLL VENOUS BLD VENIPUNCTURE: CPT | Performed by: CLINICAL NURSE SPECIALIST

## 2021-10-06 PROCEDURE — 99233 SBSQ HOSP IP/OBS HIGH 50: CPT | Performed by: INTERNAL MEDICINE

## 2021-10-06 PROCEDURE — 90937 HEMODIALYSIS REPEATED EVAL: CPT

## 2021-10-06 PROCEDURE — 84100 ASSAY OF PHOSPHORUS: CPT | Performed by: CLINICAL NURSE SPECIALIST

## 2021-10-06 PROCEDURE — 82310 ASSAY OF CALCIUM: CPT | Performed by: CLINICAL NURSE SPECIALIST

## 2021-10-06 PROCEDURE — 99207 PR CDG-MDM COMPONENT: MEETS MODERATE - DOWN CODED: CPT | Performed by: INTERNAL MEDICINE

## 2021-10-06 PROCEDURE — 258N000003 HC RX IP 258 OP 636: Performed by: CLINICAL NURSE SPECIALIST

## 2021-10-06 RX ADMIN — Medication 40 MG: at 08:45

## 2021-10-06 RX ADMIN — HYDROMORPHONE HYDROCHLORIDE 1 MG: 1 SOLUTION ORAL at 23:36

## 2021-10-06 RX ADMIN — HYDROMORPHONE HYDROCHLORIDE 1 MG: 1 SOLUTION ORAL at 14:16

## 2021-10-06 RX ADMIN — Medication 6 MG: at 02:12

## 2021-10-06 RX ADMIN — HYDROXYZINE HYDROCHLORIDE 50 MG: 25 TABLET, FILM COATED ORAL at 20:34

## 2021-10-06 RX ADMIN — PROCHLORPERAZINE MALEATE 5 MG: 5 TABLET ORAL at 06:51

## 2021-10-06 RX ADMIN — Medication 1 PACKET: at 08:44

## 2021-10-06 RX ADMIN — Medication 1 PACKET: at 14:16

## 2021-10-06 RX ADMIN — FOLIC ACID 1 MG: 1 TABLET ORAL at 08:43

## 2021-10-06 RX ADMIN — PROCHLORPERAZINE MALEATE 5 MG: 5 TABLET ORAL at 12:48

## 2021-10-06 RX ADMIN — SODIUM CHLORIDE 250 ML: 9 INJECTION, SOLUTION INTRAVENOUS at 13:42

## 2021-10-06 RX ADMIN — SODIUM BICARBONATE 325 MG: 325 TABLET ORAL at 17:02

## 2021-10-06 RX ADMIN — SODIUM CHLORIDE 300 ML: 9 INJECTION, SOLUTION INTRAVENOUS at 13:41

## 2021-10-06 RX ADMIN — POTASSIUM CHLORIDE 20 MEQ: 1.5 POWDER, FOR SOLUTION ORAL at 08:44

## 2021-10-06 RX ADMIN — SODIUM BICARBONATE 325 MG: 325 TABLET ORAL at 20:33

## 2021-10-06 RX ADMIN — SEVELAMER CARBONATE 1.6 G: 800 POWDER, FOR SUSPENSION ORAL at 08:44

## 2021-10-06 RX ADMIN — PANCRELIPASE 2 CAPSULE: 24000; 76000; 120000 CAPSULE, DELAYED RELEASE PELLETS ORAL at 00:56

## 2021-10-06 RX ADMIN — MIDODRINE HYDROCHLORIDE 15 MG: 5 TABLET ORAL at 14:16

## 2021-10-06 RX ADMIN — PANCRELIPASE 2 CAPSULE: 24000; 76000; 120000 CAPSULE, DELAYED RELEASE PELLETS ORAL at 08:43

## 2021-10-06 RX ADMIN — ACETAMINOPHEN 325 MG: 325 SOLUTION ORAL at 08:43

## 2021-10-06 RX ADMIN — HYDROMORPHONE HYDROCHLORIDE 1 MG: 1 SOLUTION ORAL at 06:51

## 2021-10-06 RX ADMIN — MIDODRINE HYDROCHLORIDE 15 MG: 5 TABLET ORAL at 20:34

## 2021-10-06 RX ADMIN — METHYLPHENIDATE HYDROCHLORIDE 5 MG: 5 TABLET ORAL at 12:48

## 2021-10-06 RX ADMIN — THIAMINE HCL TAB 100 MG 100 MG: 100 TAB at 08:43

## 2021-10-06 RX ADMIN — SEVELAMER CARBONATE 1.6 G: 800 POWDER, FOR SUSPENSION ORAL at 17:03

## 2021-10-06 RX ADMIN — PROCHLORPERAZINE MALEATE 5 MG: 5 TABLET ORAL at 20:33

## 2021-10-06 RX ADMIN — SODIUM BICARBONATE 325 MG: 325 TABLET ORAL at 12:48

## 2021-10-06 RX ADMIN — HYDROMORPHONE HYDROCHLORIDE 1 MG: 1 SOLUTION ORAL at 22:15

## 2021-10-06 RX ADMIN — MIDODRINE HYDROCHLORIDE 15 MG: 5 TABLET ORAL at 08:44

## 2021-10-06 RX ADMIN — ACETAMINOPHEN 325 MG: 325 SOLUTION ORAL at 23:35

## 2021-10-06 RX ADMIN — HYDROMORPHONE HYDROCHLORIDE 1 MG: 1 SOLUTION ORAL at 02:12

## 2021-10-06 RX ADMIN — PANCRELIPASE 2 CAPSULE: 24000; 76000; 120000 CAPSULE, DELAYED RELEASE PELLETS ORAL at 12:49

## 2021-10-06 RX ADMIN — HYDROXYZINE HYDROCHLORIDE 50 MG: 25 TABLET, FILM COATED ORAL at 14:16

## 2021-10-06 RX ADMIN — HYDROXYZINE HYDROCHLORIDE 50 MG: 25 TABLET, FILM COATED ORAL at 08:43

## 2021-10-06 RX ADMIN — SODIUM BICARBONATE 325 MG: 325 TABLET ORAL at 00:56

## 2021-10-06 RX ADMIN — HYDROMORPHONE HYDROCHLORIDE 1 MG: 1 SOLUTION ORAL at 12:48

## 2021-10-06 RX ADMIN — SODIUM BICARBONATE 325 MG: 325 TABLET ORAL at 08:43

## 2021-10-06 RX ADMIN — SEVELAMER CARBONATE 1.6 G: 800 POWDER, FOR SUSPENSION ORAL at 20:33

## 2021-10-06 RX ADMIN — CYCLOBENZAPRINE 10 MG: 10 TABLET, FILM COATED ORAL at 12:49

## 2021-10-06 RX ADMIN — ACETAMINOPHEN 325 MG: 325 SOLUTION ORAL at 18:50

## 2021-10-06 RX ADMIN — ONDANSETRON HYDROCHLORIDE 4 MG: 4 TABLET, FILM COATED ORAL at 23:36

## 2021-10-06 RX ADMIN — Medication 5 ML: at 08:45

## 2021-10-06 RX ADMIN — PANCRELIPASE 2 CAPSULE: 24000; 76000; 120000 CAPSULE, DELAYED RELEASE PELLETS ORAL at 05:25

## 2021-10-06 RX ADMIN — PANCRELIPASE 2 CAPSULE: 24000; 76000; 120000 CAPSULE, DELAYED RELEASE PELLETS ORAL at 20:35

## 2021-10-06 RX ADMIN — HYDROMORPHONE HYDROCHLORIDE 1 MG: 1 SOLUTION ORAL at 17:04

## 2021-10-06 RX ADMIN — PANCRELIPASE 2 CAPSULE: 24000; 76000; 120000 CAPSULE, DELAYED RELEASE PELLETS ORAL at 17:02

## 2021-10-06 RX ADMIN — HYDROMORPHONE HYDROCHLORIDE 1 MG: 1 SOLUTION ORAL at 20:33

## 2021-10-06 RX ADMIN — METHYLPHENIDATE HYDROCHLORIDE 5 MG: 5 TABLET ORAL at 08:43

## 2021-10-06 RX ADMIN — HYDROMORPHONE HYDROCHLORIDE 1 MG: 1 SOLUTION ORAL at 05:24

## 2021-10-06 RX ADMIN — SODIUM BICARBONATE 325 MG: 325 TABLET ORAL at 05:24

## 2021-10-06 RX ADMIN — EPOETIN ALFA-EPBX 5000 UNITS: 10000 INJECTION, SOLUTION INTRAVENOUS; SUBCUTANEOUS at 13:42

## 2021-10-06 RX ADMIN — Medication 1 PACKET: at 20:35

## 2021-10-06 ASSESSMENT — ACTIVITIES OF DAILY LIVING (ADL)
ADLS_ACUITY_SCORE: 17
ADLS_ACUITY_SCORE: 15
ADLS_ACUITY_SCORE: 17

## 2021-10-06 ASSESSMENT — MIFFLIN-ST. JEOR: SCORE: 1552.75

## 2021-10-06 NOTE — PROGRESS NOTES
[facility]    Progress Note - Daniela 3 Service  Date of Admission:  6/28/2021    Changes today:  - Medically ready for discharge to LTC, awaiting placement  - Working to arrange dental appointment for extractions ([facility]), can also be done as an outpatient.  - Continues to have asymptomatic hypotension (chronic, likely autonomic insufficiency component from chronic illness & prolonged hospitalization)  - At LTC, if asymptomatic hypotension without clinical changes, further hospitalization would not be required.    Assessment & Plan             Dax Villareal is a 30 year old man with hx of alcohol use disorder admitted on 6/28/2021 after recent prolonged admission at [facility] who has severe acute alcoholic hepatitis c/b HE, ESRD requiring HD, and malnutrition in the setting of acute necrotizing pancreatitis, s/p PEG-J placement on 7/13. He arrived with acute respiratory failure and septic shock that have resolved, and his secondary bacterial peritonitis has been appropriately treated. He requires continued management for ESRD, hepatic encephalopathy, infected pancreatic pseudocyst, nutritional support via PEG-J, and persistent right pleural effusion. Clinically improving after multiple necrosectomies. Increasing leukocytosis and hypotension on 9/2 prompting resumption of IV antibiotics - has been stable since that time. Repeat CT with interval improvement in fluid collections.    VRE, pseudomonas, and candida in wound culture  Obtained from around G-tube site during a rapid response on 9/28 for lactic acidosis (2.1). Unclear utility of a wound culture, however it is growing multiple drug resistant bacteria. Discussed with ID curbside physician, given complex case and history will place formal consult. Per ID, no treatment needed.  - No antibiotics  - ID signed off    Asymptomatic hypotension  Hypovolemia  Patient with substantial daily NG output and  poor PO. Also note significant weight loss over the past few weeks. He has needed intermittent boluses of normal saline for euvolemia. Have discussed with renal multiple times - please re-evaluate volume status vs dry weight as he is often hypotensive following fluid removal.  - Weight 78kg on 8/13. Then 55 kg, weight 68.5kg on 9/22. Concern for weight loss vs. targeting dry weight.  - Monitor vitals and output post dialysis, consider fluids prn.  - cont midodrine 15 TID with Nephrology    Necrotizing alcoholic pancreatitis w/ infected pseudocyst, improving  Hepatic Fluid collection, stable to improving  Secondary Bacterial Peritonitis 2/2 infected pseudocyst with VRE, improving  Septic Shock, resolved  Admitted to the [facility] ICU on 6/28/2021 from [facility] in [place] for septic shock due to necrotizing pancreatitis. CT on admit showing mature collection with enhancing wall measuring ~[id number]. IR placed perc drain 6/29. Repeat imaging 7/18 revealed walled off necrotic collection amenable to endoscopic transluminal drainage with cystgastrostomy stenting with necrosectomy on 7/21/21. Complete necrosectomy on 8/11. Antibiotics (cefepime and metronidazole) were discontinued on 8/25 after left flank drain removed, re-initiated in the setting of fever on 9/2. Favor abdominal etiology, repeat CT on 9/7 with interval improvement in fluid collections - completed 7d course of abx per ID with EOT 9/8. Nausea & vomiting with PO intake is likely 2/2 complications from pancreatitis (gastric outlet obstruction vs duodenal stricture), patient is not a candidate for whipple at this time and is already on the alternative therapy which is g-tube decompression and GJ feeds. If overall health improves, could follow up with GI as an outpatient to consider whipple in future.  - Will likely need intermittent antibiotics with fluid collections occasionally with bacterial growth moving forward as to be expected in nec panc  -  Palliative care following for pain management and goals of care given overall illness    G-tube Fistula  Dax had been having increasing G-tube output and was found to have an erythematous indurated area next to his G-tube insertion. This was found to be his G-tube balloon pushing through the skin. He underwent G-tube replacement on  9/13/21. His old G-tube site has since had large volume output requiring frequent wound care dressing changes.  - 9/20/21 - EGD to close fistula completed by GI  - Wound team consulted, follow their wound care recs    Periapical abscess: L 3rd maxillary molar  Dental Caries  - Dentistry consulted, and discussed with oral surgery  - s/p Amoxicillin for 7d course (9/21-9/27)  - Dental extraction when stable to be transported (while inpatient) to [facility]    Gastric Outlet Obstruction  High G tube output due to the cystogastric connection/fluid drainage noted on 8/5. Per GI, possibly related to gastric outlet from severe abdominal inflammation and is expected. Continues to have mild to moderate NG output - stable to improved. Relieved with draining G tube to gravity when experiencing pain/nausea.    Hyponatremia  Patient with down-trending sodium the week of 8/27; likely due to substantial NG output and low solute intake and hepatic dysfunction. Stable 127-128. Then week of 9/27 hyponatremia (120), likely due to GI losses, nephrology following. Per nephrology, this is related to his chronic illness, ESRD, and liver disease and apart from maintaining a 1.5L fluid restriction there is nothing to do for this. Of note, his hyponatremia began when his PEG tube was placed, unclear what this association may be.    Right Pancreatic Hydrothorax, stable  Increased dyspnea with CXR on 7/19 with further imaging and thoracentesis confirming a hepatic/pancreatic hydrothorax. Thoracentesis PRN for dyspnea, most recently 9/3.    ESRD due to ATN  Hyperphosphatemia  Hypokalemia  Stage III RADHA  "due to ATN from septic shock without recovery for >3 months. Line is tunneled dialysis catheter from [facility]. Nephrology dialyzed on Tu, Th, Sa schedule.  - Sevelemer powder TID with TF for hyperphosphatemia  - 20mEq potassium through feeding tube daily     Anemia of chronic illness  Hematochezia, resolved  Hemoperitoneum  Baseline Hgb 17. Running between 6-8 in setting of chronic illness, new ESRD with epocrit per Nephrology, frequent lab draws and procedures. Hematochezia on 8/8 after starting standard intensity heparin, now resolved. Paracentesis on 8/21 with hemoperitoneum. No active bleeding. Required a unit of PRBC on 9/4 in the setting of several fluid boluses.   - Goal hgb > 7.0, trend Hgb     Alcohol Use Disorder  Counseled patient on complete alcohol cessation. Not interested in additional services at this time. Continue daily folic acid and thiamine.  - Ensure patient has resources for treatment at discharge if managing alcohol cessation independently becomes challenging.    Severe malnutrition of chronic illness  Poor oral intake in the setting of necrotizing pancreatitis. PEG tube placed 7/13, replaced 9/14. Continuous tube feeds with goal of 65 ml/hr with continuous pancreatic enzymes. Transitioning to renal formula.  - Continuous tube feeds  - Advancing diet as tolerated    Suspected sleep apnea  Patient requests supplemental oxygen at night as his friends have told him he may have sleep apnea and he feels that the oxygen helps him \"breath lighter\". No hypoxia.  - Outpatient sleep study       Diet: Advance diet as tolerated  DVT Prophylaxis: SCDs  Rdz Catheter: Not present  Central Lines: None    Code Status: Full Code      Disposition Plan   Expected discharge: 7-8 days  recommended to long term care once adequate pain management/ tolerating PO medications and safe disposition plan/ TCU bed available     The patient's care was discussed with the Attending Physician, Dr. Yu.    Marleny Monk, " MD  PGY-1, Internal Medicine-Pediatrics  [place] [facility]  ______________________________________________________________________    Interval History   NAEO. No new pain or concerns today. Continues to have some nausea and vomiting with oral intake.    Data reviewed today: I reviewed all medications, new labs and imaging results over the last 24 hours. I personally reviewed no images or EKG's today.    Physical Exam   Vital Signs: Temp: (!) 96.6  F (35.9  C) Temp src: Oral BP: (!) 95/38 Pulse: 91   Resp: 18 SpO2: 93 % O2 Device: Nasal cannula Oxygen Delivery: 4 LPM  Weight: 139 lbs 5.29 oz  General Appearance: chronically ill appearing, in no acute distress, more alert and interactive than previous  Respiratory: normal work of breathing on ambient air  Cardiovascular: regular rate  GI: Mildly tender to palpation  Skin: dressings in place around G tube, no visible drainage or erythema  Other: Alert, conversant, moving extremities spontaneously. Feet appear contracted and deconditioned.

## 2021-10-06 NOTE — PROGRESS NOTES
[facility]  Palliative Care Daily Progress Note       Recommendations & Counseling       Pain: Dax tells me his pain is under excellent control with current hydromorphone doses and he is able to get several hours of sleep in a row and feels pain is well managed on current regimen and he therefore does not want to make any changes to this regimen. I discussed with him the need to slowly taper down on opioids and eventually get off all opioids and he reluctantly agreed to a slow taper off opioids. I am concerned that Dax's pain can not be treated with opioids because it is multifactorial (back pain from being bed bound for so long, abdominal pain from multiple surgical procedures and ongoing GI issues, foot pain and numbness / neuropathy from critical illness with prolonged hospitalization) and there is likely a component of his pain that is emotional due to all the changes that have occurred over the past months (renal failure, prolonged hospitalization, liver failure). I do not believe that the opioids will ever significantly be able to treat his pain and he therefore needs to get off of them with a very slow taper. He also needs continued support from health psychology both in patient and after discharge. I will outline a plan for tapering off his opioids.     Dax has been on opioids for several months now so would plan a taper over 1-2 months. The only time constraint is that he needs to be off opioids before discharging from a TCU. Would plan slow taper as follows:     Continue hydromorphone 1mg TID and 1mg q3hrs PRN for now. On Friday, would discontinue the TID scheduled doses and continue the q3hr doses. Would continue hydromorphone 1mg q3hrs PRN for at least 7 days and then would decrease hydromorphone 1mg q4hrs PRN - would allow this x 1-2 weeks and then decrease to 1mg QID PRN x 1-2 weeks then 1mg TID PRN and continue until no longer on opioids.     Likely  foot pain and numbness is peripheral neuropathy from prolonged hospitalization / critical illness. Recommend starting gabapentin 100mg post dialysis only three times a week and would continue at this dose for at least a week to see if tolerating it and make sure it is not causing excessive sedation. If tolerating without problems, could consider titrating to effect up to 300 mg 3 times per week given after hemodialysis on dialysis day     Greatly appreciate health psychology support and recommend ongoing support after discharge.     Goals - Dax's goals remain restorative    Palliative care will sign off for now but please feel free to re-consult us with any questions or concerns.        Thank you for the opportunity to continue to participate in the care of this patient and family.  Please feel free to contact on-call palliative provider with any emergent needs.  We can be reached via team pager [phone] (answered 8-4:30 Monday-Friday); after-hours answering service ([phone]);     Valentina Varela MD / Palliative Medicine / Pager [phone]     Total time spent was 35 minutes spent in reviewing record, patient examination and counseling, discussion with primary team and documentation. >50% of time was spent counseling and/or coordination of care regarding symptom management, plan of care.     Assessments          Dax Villareal is a 30 year old man with history of alcohol dependence with a prolonged and complex hospitalization for acute alcoholic hepatitis and associated liver failure that has been complicated by RADHA requiring HD, hepatic encephalopathy, SBP causing septic shock, infected necrotizing pancreatitis requiring drainage and multiple necrosectomies, malnutrition requiring PEG-J leading to fistula s/p endoscopic suture gastroplexy, with continued pain management issues and palliative medicine re-consulted to assist with pain management, nausea and goals of care.      Today, the patient was  seen for:  acute alcoholic hepatitis and associated liver failure  RADHA requiring HD  Malnutrition  Complex pain   Nausea   support      Prognosis, Goals, or Advance Care Planning was addressed today with: No.     Mood, coping, and/or meaning in the context of serious illness were addressed today: No.              Interval History:     Chart review/discussion with unit or clinical team members:   No acute events over night    Per patient or family/caregivers today:  Pain well controlled on current regimen.   Nausea controlled today    Key Palliative Symptoms:   Pain location: back, abdomen, feet  # Pain severity the last 12 hours: low  # Dyspnea severity the last 12 hours: none  # Nausea severity the last 12 hours: low    Patient is on opioids: assessed and bowels ok/no needed changes to plan of care today.           Review of Systems:     Besides above, a complete 10+ ROS was reviewed and is unremarkable           Medications:     I have reviewed this patient's medication profile and medications during this hospitalization.    Noted meds:    Hydromorphone 1mg TID scheduled and 1mg q3hrs PRN - used 10mg x 24 hrs - asking for PRN every 3 hours  Flexeril PRN - using approx once a day           Physical Exam:   Vitals were reviewed  Temp: 97.6  F (36.4  C) Temp src: Oral BP: 95/52 Pulse: 96   Resp: 19 SpO2: 98 % O2 Device: Nasal cannula Oxygen Delivery: 4 LPM  Gen: alert, lying in bed in dialysis center, appears comfortable, stated age, in NAD  Eyes: Conjunctiva clear. Sclera anicteric .  HENT: NCAT; mucous membranes moist  Resp: no increased work of breathing,   Neuro: A&O x 3; CN II-XII grossly intact;   Mental status/Psych: alert, engaged; sensorium intact             Data Reviewed:     Reviewed recent labs and pertinent imaging

## 2021-10-06 NOTE — PROGRESS NOTES
Nephrology Progress Note  10/06/2021           Dax Villareal is a 30 year old male with h/o ETOH hepatitis, end stage liver disease, RADHA on HD, transferred from [facility] in [place] for septic shock on 6/28/21 for treatment of necrotizing pancreatitis and decompensated liver disease. He was initially admitted to [facility] 5/19/21 and started on RRT 5/26/2021.        Interval History :   Mr Villareal had necrosectomy for 4th time on 8/11 with no more anticipated, medically ready for TCU but awaiting placement.  Running HD today, ordered for 0-1L of UF as his G-tube drainage was 1.5L yesterday and he was net negative with SBP's in the 90's this am.         Assessment & Recommendations:   ESRD-Cr was 0.8 as recently as 5/19/2021 but now HD dependent for 3 months, started RRT at [facility] prior to transfer to [facility] on 5/26.  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  continuing restorative cares for now, BP's have been stable enough to support HD.  Had GI stenting of pancreas x4.                 -Line is TDC from PTA               -Planning HD on MWF schedule, refuses extra runs, will continue to offer.       Volume status-Anuric, wt not reliable, pulling 0-1L of UF with run today as his GI drain output was more than intake yesterday and SBP's are in 90's.     Electrolytes/pH-Drawing labs on runs, last Na 125, runs chronically low, should correct some on run today.  K 3.1, and bicarb 25 no acute issues.      Ca/phos/pth-Ca 9.6, Mg 1.7, Phos 5.1 last check, drawing labs on runs. On Sevelamer 1.6g TID, is on Ca carbonate as well and should come down with HD, following closely.                     Anemia-Hgb 9.3, acute management per team, last PRBC's 7/14.  On EPO 5k with runs, iron sats 43%      Nutrition-Taking PO, appetite poor lately, on Vital TF but having frequent nausea.     Seen and discussed with Dr Nogueira      Recommendations were  "communicated to primary team via note       Tor Larsen, SOFIA CNS  Clinical Nurse Specialist  [phone]    Review of Systems:   I reviewed the following systems:  Gen: No fevers or chills  CV: No CP at rest  Resp: No SOB at rest  GI: No N/V    Physical Exam:   I/O last 3 completed shifts:  In: 1080   Out: 1060 [Urine:10; Emesis/NG output:1050]   BP (!) 95/38 (BP Location: Left arm)   Pulse 91   Temp (!) 96.6  F (35.9  C) (Oral)   Resp 18   Ht 1.676 m (5' 6\")   Wt 63.2 kg (139 lb 5.3 oz)   SpO2 93%   BMI 22.49 kg/m       GENERAL APPEARANCE: Lying in bed, in no distress.    EYES:  scleral icterus, pupils equal  Pulmonary: decreased BS   CV: tachy   - Edema - no LE edema  GI: mild distention, non-tender. + tenderness at G-tube site and generalized abdominal pain.    MS: no evidence of inflammation in joints, no muscle tenderness  SKIN: no rash, warm, dry, no cyanosis, + G tube  NEURO: alert/interactive  ACCESS: Right TDC with no induration or erythema    Labs:   All labs reviewed by me  Electrolytes/Renal - Recent Labs   Lab Test 10/04/21  1330 10/02/21  1358 10/01/21  1052 09/30/21 2013 09/30/21 2013 09/30/21  1054 09/30/21  1054 09/29/21  0311 09/28/21  1005   * 126* 125*  125*   < > 127*   < > 128*   < > 128*   POTASSIUM 3.1* 3.3* 3.7  3.7   < > 4.1   < > 2.9*   < > 3.1*   CHLORIDE 89* 92* 91*  91*   < > 93*   < > 92*   < > 92*   CO2 25 26 22  22   < > 25   < > 25   < > 23   BUN 52* 36* 51*  51*   < > 42*   < > 34*   < > 37*   CR 3.79* 3.27* 4.27*  4.27*   < > 3.58*   < > 3.13*   < > 3.17*   * 102* 127*  127*   < > 94   < > 114*   < > 130*   JANENE 9.6 9.8 10.3*  10.3*   < > 9.6   < > 9.5   < > 9.5   MAG  --   --  1.7  --  1.9  --  1.9   < > 1.8   PHOS  --   --  5.1*  --   --   --  4.3  --  4.4    < > = values in this interval not displayed.       CBC -   Recent Labs   Lab Test 10/04/21  1330 10/02/21  1034 10/01/21  1052   WBC 9.0 9.2 10.0   HGB 9.3* 8.9* 9.0*    239 266 "       LFTs -   Recent Labs   Lab Test 10/01/21  1052 09/28/21  1005 09/04/21  0414 09/03/21  0523 09/03/21  0523   ALKPHOS 96  --  81  --  95   BILITOTAL 0.9  --  1.4*  --  2.0*   ALT 17  --  29  --  31   AST 23  --  38  --  39   PROTTOTAL 8.7  --  8.8  --  9.2*   ALBUMIN 2.7* 2.5* 2.9*   < > 2.3*    < > = values in this interval not displayed.       Iron Panel -   Recent Labs   Lab Test 08/14/21  2055 07/19/21  0632   IRON 28* 31*   IRONSAT 21 43   JERRELL 2,186*  --            Current Medications:    sodium chloride 0.9%  250 mL Intravenous Once in dialysis/CRRT     sodium chloride 0.9%  300 mL Hemodialysis Machine Once     sodium bicarbonate  325 mg Per Feeding Tube Q4H    And     amylase-lipase-protease  1-2 capsule Per Feeding Tube Q4H     amylase-lipase-protease  2 capsule Per G Tube TID w/meals     B and C vitamin Complex with folic acid  5 mL Per Feeding Tube Daily     epoetin charles-epbx (RETACRIT) inj ESRD  5,000 Units Intravenous Once in dialysis/CRRT     fiber modular (NUTRISOURCE FIBER)  1 packet Per Feeding Tube TID     folic acid  1 mg Oral or Feeding Tube Daily     heparin lock flush  5-20 mL Intracatheter Q24H     HYDROmorphone (STANDARD CONC)  1 mg Oral TID     influenza quadrivalent (PF) vacc  0.5 mL Intramuscular Prior to discharge     melatonin  6 mg Oral or Feeding Tube At Bedtime     menthol   Transdermal Q8H     methylphenidate  5 mg Per Feeding Tube BID     midodrine  15 mg Per Feeding Tube TID     - MEDICATION INSTRUCTIONS -   Does not apply Once     ondansetron  4 mg Per G Tube TID     pantoprazole  40 mg Oral or Feeding Tube QAM AC     potassium chloride  20 mEq Per Feeding Tube Daily     protein modular  1 packet Per Feeding Tube Daily     sevelamer carbonate (RENVELA)  1.6 g Oral or Feeding Tube TID     sodium chloride (PF)  3 mL Intracatheter Q8H     sodium chloride (PF)  9 mL Intracatheter During Dialysis/CRRT (from stock)     sodium chloride (PF)  9 mL Intracatheter During Dialysis/CRRT  (from stock)     vitamin B1  100 mg Oral or Feeding Tube Daily       - MEDICATION INSTRUCTIONS -       dextrose       heparin (porcine) 1,000 Units/hr (08/24/21 0980)

## 2021-10-06 NOTE — PLAN OF CARE
Pt A&O x4, VSS on 4L NC for comfort. Satting low 90's on RA. Up assist x2. Assist x1 with bedpan. G tube to gravity. J running TF at 45 mL/hr. Goal rate. Pt had one emesis. Given compazine with good relief. C/o abdominal and generalized pain. Given PRN dilaudid and tylenol. No IV access. No appetite. Pt has fissure in crack of buttock covered with mepilex. Awaiting placement. Will continue to monitor.

## 2021-10-06 NOTE — PROGRESS NOTES
HEMODIALYSIS TREATMENT NOTE    Date: 10/6/2021  Time: 4:44 PM    Data:  Pre Wt: 63 kg (138 lb 14.2 oz)   Desired Wt: 62.2 kg   Post Wt: 62 kg (136 lb 11 oz)  Weight change: 1 kg  Ultrafiltration - Post Run Net Total Removed (mL): 0 mL  Vascular Access Status: patent  Dialyzer Rinse: Streaked  Total Blood Volume Processed: 49.5 L Liters  Total Dialysis (Treatment) Time: 3 Hours    Lab:   BMP., cbc, hep b      Interventions/Assessment:  HD via cvc, 3k 2.25ca , 3hr run, 0kg net removed, 350bfr/600dfr, soft bps during run. MD notified. cvc locked with saline, hand off report given to PCN.      Plan:    As per renal

## 2021-10-06 NOTE — PLAN OF CARE
Assumed cares: [id number]     Events: PRN Compazine, flexeril, dilaudid, atarax, and tylenol given x1. Pt brought to dialysis at 1pm.   VS: Asymptomic hypotension, on RA to 4L NC per pt request. Tachycardic.      GI/: Anuric, on hemodialysis. 1x BM. G tube to gravity drainage.   Nutrition: Poor appetite. J tube infusing TF @ 45mL/hr goal rate.   Skin: Abdominal dressing CDI.      Plan: Dialysis MWF. Social work pursuing LTC placement.     Dental clinic appointment set up for 1pm tomorrow (10/7) on [facility] at [address]. Medical transport needs to be set up. Clinic number [phone]

## 2021-10-06 NOTE — PLAN OF CARE
Assumed cares [id number]. VSS on 3-4L. A1 with turns in bed, refuses repositioning. A&Ox4. TF infusing @ goal rate of 45mL/hr through J-tube and G-tube open to gravity. PEG tube bleeding and dressing changed x1. Old PEG site also leaking and dressing changed. HD line CDI. Peeling skin on feet bilaterally. Oliguric and BM x1 this shift. Complained of abdominal pain radiating to back and given PRN Atarax and Dilaidud. Planning to have dialysis this AM. Will continue POC.

## 2021-10-07 ENCOUNTER — APPOINTMENT (OUTPATIENT)
Dept: OCCUPATIONAL THERAPY | Facility: CLINIC | Age: 31
End: 2021-10-07
Attending: INTERNAL MEDICINE
Payer: MEDICAID

## 2021-10-07 LAB
ANION GAP SERPL CALCULATED.3IONS-SCNC: 13 MMOL/L (ref 3–14)
BUN SERPL-MCNC: 46 MG/DL (ref 7–30)
CALCIUM SERPL-MCNC: 10.2 MG/DL (ref 8.5–10.1)
CHLORIDE BLD-SCNC: 87 MMOL/L (ref 94–109)
CO2 SERPL-SCNC: 25 MMOL/L (ref 20–32)
CREAT SERPL-MCNC: 3.95 MG/DL (ref 0.66–1.25)
GFR SERPL CREATININE-BSD FRML MDRD: 19 ML/MIN/1.73M2
GLUCOSE BLD-MCNC: 113 MG/DL (ref 70–99)
HBV SURFACE AB SERPL IA-ACNC: 240.1 M[IU]/ML
LACTATE SERPL-SCNC: 1.3 MMOL/L (ref 0.7–2)
POTASSIUM BLD-SCNC: 3.4 MMOL/L (ref 3.4–5.3)
SODIUM SERPL-SCNC: 125 MMOL/L (ref 133–144)

## 2021-10-07 PROCEDURE — 93010 ELECTROCARDIOGRAM REPORT: CPT | Performed by: INTERNAL MEDICINE

## 2021-10-07 PROCEDURE — 250N000013 HC RX MED GY IP 250 OP 250 PS 637: Performed by: INTERNAL MEDICINE

## 2021-10-07 PROCEDURE — 82310 ASSAY OF CALCIUM: CPT | Performed by: STUDENT IN AN ORGANIZED HEALTH CARE EDUCATION/TRAINING PROGRAM

## 2021-10-07 PROCEDURE — 250N000013 HC RX MED GY IP 250 OP 250 PS 637: Performed by: HOSPITALIST

## 2021-10-07 PROCEDURE — 93005 ELECTROCARDIOGRAM TRACING: CPT

## 2021-10-07 PROCEDURE — 97535 SELF CARE MNGMENT TRAINING: CPT | Mod: GO

## 2021-10-07 PROCEDURE — U0003 INFECTIOUS AGENT DETECTION BY NUCLEIC ACID (DNA OR RNA); SEVERE ACUTE RESPIRATORY SYNDROME CORONAVIRUS 2 (SARS-COV-2) (CORONAVIRUS DISEASE [COVID-19]), AMPLIFIED PROBE TECHNIQUE, MAKING USE OF HIGH THROUGHPUT TECHNOLOGIES AS DESCRIBED BY CMS-2020-01-R: HCPCS | Performed by: STUDENT IN AN ORGANIZED HEALTH CARE EDUCATION/TRAINING PROGRAM

## 2021-10-07 PROCEDURE — 97530 THERAPEUTIC ACTIVITIES: CPT | Mod: GO

## 2021-10-07 PROCEDURE — 250N000011 HC RX IP 250 OP 636: Performed by: INTERNAL MEDICINE

## 2021-10-07 PROCEDURE — 250N000013 HC RX MED GY IP 250 OP 250 PS 637

## 2021-10-07 PROCEDURE — 120N000002 HC R&B MED SURG/OB UMMC

## 2021-10-07 PROCEDURE — 83605 ASSAY OF LACTIC ACID: CPT | Performed by: INTERNAL MEDICINE

## 2021-10-07 PROCEDURE — 99233 SBSQ HOSP IP/OBS HIGH 50: CPT | Mod: GC | Performed by: INTERNAL MEDICINE

## 2021-10-07 PROCEDURE — 36415 COLL VENOUS BLD VENIPUNCTURE: CPT | Performed by: INTERNAL MEDICINE

## 2021-10-07 RX ORDER — GABAPENTIN 100 MG/1
100 CAPSULE ORAL
Status: DISCONTINUED | OUTPATIENT
Start: 2021-10-08 | End: 2021-10-12 | Stop reason: HOSPADM

## 2021-10-07 RX ORDER — ONDANSETRON 4 MG/1
4 TABLET, FILM COATED ORAL EVERY 8 HOURS PRN
Status: DISCONTINUED | OUTPATIENT
Start: 2021-10-07 | End: 2021-10-12 | Stop reason: HOSPADM

## 2021-10-07 RX ADMIN — HYDROMORPHONE HYDROCHLORIDE 1 MG: 1 SOLUTION ORAL at 09:10

## 2021-10-07 RX ADMIN — Medication 1 PACKET: at 09:11

## 2021-10-07 RX ADMIN — THIAMINE HCL TAB 100 MG 100 MG: 100 TAB at 09:10

## 2021-10-07 RX ADMIN — PROCHLORPERAZINE MALEATE 5 MG: 5 TABLET ORAL at 20:42

## 2021-10-07 RX ADMIN — PANCRELIPASE 2 CAPSULE: 24000; 76000; 120000 CAPSULE, DELAYED RELEASE PELLETS ORAL at 12:45

## 2021-10-07 RX ADMIN — HYDROMORPHONE HYDROCHLORIDE 1 MG: 1 SOLUTION ORAL at 15:16

## 2021-10-07 RX ADMIN — SODIUM BICARBONATE 325 MG: 325 TABLET ORAL at 09:10

## 2021-10-07 RX ADMIN — Medication 40 MG: at 06:49

## 2021-10-07 RX ADMIN — Medication 1 PACKET: at 15:16

## 2021-10-07 RX ADMIN — HYDROMORPHONE HYDROCHLORIDE 1 MG: 1 SOLUTION ORAL at 06:49

## 2021-10-07 RX ADMIN — SEVELAMER CARBONATE 1.6 G: 800 POWDER, FOR SUSPENSION ORAL at 21:27

## 2021-10-07 RX ADMIN — ACETAMINOPHEN 325 MG: 325 SOLUTION ORAL at 11:28

## 2021-10-07 RX ADMIN — SODIUM BICARBONATE 325 MG: 325 TABLET ORAL at 21:27

## 2021-10-07 RX ADMIN — HYDROMORPHONE HYDROCHLORIDE 1 MG: 1 SOLUTION ORAL at 17:28

## 2021-10-07 RX ADMIN — MIDODRINE HYDROCHLORIDE 15 MG: 5 TABLET ORAL at 15:16

## 2021-10-07 RX ADMIN — HYDROMORPHONE HYDROCHLORIDE 1 MG: 1 SOLUTION ORAL at 05:38

## 2021-10-07 RX ADMIN — PANCRELIPASE 1 CAPSULE: 24000; 76000; 120000 CAPSULE, DELAYED RELEASE PELLETS ORAL at 04:10

## 2021-10-07 RX ADMIN — Medication 5 ML: at 09:10

## 2021-10-07 RX ADMIN — POTASSIUM CHLORIDE 20 MEQ: 1.5 POWDER, FOR SOLUTION ORAL at 09:09

## 2021-10-07 RX ADMIN — CYCLOBENZAPRINE 10 MG: 10 TABLET, FILM COATED ORAL at 20:42

## 2021-10-07 RX ADMIN — PANCRELIPASE 1 CAPSULE: 24000; 76000; 120000 CAPSULE, DELAYED RELEASE PELLETS ORAL at 00:10

## 2021-10-07 RX ADMIN — HYDROMORPHONE HYDROCHLORIDE 1 MG: 1 SOLUTION ORAL at 20:42

## 2021-10-07 RX ADMIN — Medication 6 MG: at 02:32

## 2021-10-07 RX ADMIN — SEVELAMER CARBONATE 1.6 G: 800 POWDER, FOR SUSPENSION ORAL at 09:09

## 2021-10-07 RX ADMIN — HYDROXYZINE HYDROCHLORIDE 50 MG: 25 TABLET, FILM COATED ORAL at 20:43

## 2021-10-07 RX ADMIN — ONDANSETRON HYDROCHLORIDE 4 MG: 4 TABLET, FILM COATED ORAL at 05:38

## 2021-10-07 RX ADMIN — SODIUM BICARBONATE 325 MG: 325 TABLET ORAL at 04:10

## 2021-10-07 RX ADMIN — METHYLPHENIDATE HYDROCHLORIDE 5 MG: 5 TABLET ORAL at 09:10

## 2021-10-07 RX ADMIN — Medication 1 PACKET: at 21:26

## 2021-10-07 RX ADMIN — PROCHLORPERAZINE MALEATE 5 MG: 5 TABLET ORAL at 12:53

## 2021-10-07 RX ADMIN — HYDROMORPHONE HYDROCHLORIDE 1 MG: 1 SOLUTION ORAL at 12:45

## 2021-10-07 RX ADMIN — FOLIC ACID 1 MG: 1 TABLET ORAL at 09:10

## 2021-10-07 RX ADMIN — SODIUM BICARBONATE 325 MG: 325 TABLET ORAL at 12:45

## 2021-10-07 RX ADMIN — MIDODRINE HYDROCHLORIDE 15 MG: 5 TABLET ORAL at 09:10

## 2021-10-07 RX ADMIN — HYDROMORPHONE HYDROCHLORIDE 1 MG: 1 SOLUTION ORAL at 22:26

## 2021-10-07 RX ADMIN — SEVELAMER CARBONATE 1.6 G: 800 POWDER, FOR SUSPENSION ORAL at 17:28

## 2021-10-07 RX ADMIN — SODIUM BICARBONATE 325 MG: 325 TABLET ORAL at 00:11

## 2021-10-07 RX ADMIN — METHYLPHENIDATE HYDROCHLORIDE 5 MG: 5 TABLET ORAL at 12:45

## 2021-10-07 RX ADMIN — HYDROMORPHONE HYDROCHLORIDE 1 MG: 1 SOLUTION ORAL at 02:32

## 2021-10-07 RX ADMIN — SODIUM BICARBONATE 325 MG: 325 TABLET ORAL at 17:28

## 2021-10-07 RX ADMIN — PANCRELIPASE 2 CAPSULE: 24000; 76000; 120000 CAPSULE, DELAYED RELEASE PELLETS ORAL at 17:28

## 2021-10-07 RX ADMIN — MIDODRINE HYDROCHLORIDE 15 MG: 5 TABLET ORAL at 21:27

## 2021-10-07 RX ADMIN — PANCRELIPASE 1 CAPSULE: 24000; 76000; 120000 CAPSULE, DELAYED RELEASE PELLETS ORAL at 21:28

## 2021-10-07 RX ADMIN — PANCRELIPASE 2 CAPSULE: 24000; 76000; 120000 CAPSULE, DELAYED RELEASE PELLETS ORAL at 09:10

## 2021-10-07 ASSESSMENT — ACTIVITIES OF DAILY LIVING (ADL)
ADLS_ACUITY_SCORE: 15
ADLS_ACUITY_SCORE: 15
ADLS_ACUITY_SCORE: 17
ADLS_ACUITY_SCORE: 15

## 2021-10-07 ASSESSMENT — MIFFLIN-ST. JEOR: SCORE: 1537.75

## 2021-10-07 NOTE — PLAN OF CARE
Pt A&O x4, VSS on RA. On 4L for comfort. Given dilaudid, atarax, and tylenol for pain PRN. Given compazine x1 for nausea with some relief. Had dialysis today. Peg tube infusing TF through J @ 45 mL/hr goal rate. G tube to gravity drainage. Ate 50% of a cookie and a couple grapes for dinner. Abd dressing CDI. Social waork pursuing LTC placement.     Dental clinic appointment set up for 1pm tomorrow (10/7) on [facility] at [address]. Medical transport needs to be set up. Clinic number [phone]

## 2021-10-07 NOTE — PROGRESS NOTES
"Care Management Follow Up    Length of Stay (days): 101    Expected Discharge Date: 10/07/2021     Concerns to be Addressed: discharge planning     Patient plan of care discussed at interdisciplinary rounds: Yes    Anticipated Discharge Disposition: Skilled Nursing Facilty, Transitional Care     Anticipated Discharge Services:  (New outpatient dialysis)  Anticipated Discharge DME: None    Patient/family educated on Medicare website which has current facility and service quality ratings: no (Pt requesting TCU in or near [place])  Education Provided on the Discharge Plan:    Patient/Family in Agreement with the Plan: yes    Referrals Placed by CM/SW:    Private pay costs discussed: Not applicable    Additional Information:  Followed up with [facility], Ph: [phone] this morning and they do not have an open bed today, patient is on the \"ready list\" and they are looking out to about Monday/Tuesday, possibly sooner. RNCC to follow up daily on updates. RNCC to remain avail for any further discharge planning.      Anais Stevenson RNCC  Pager: [phone]  Ph: [phone]      "

## 2021-10-07 NOTE — PROGRESS NOTES
[facility]    Progress Note - [place] 3 Service  Date of Admission:  6/28/2021    Changes today:  - Medically ready for discharge to LTACH, awaiting placement  - EKG today to assess for QTc prolongation  - Dental appt today, 1pm   - Will start opioid taper tomorrow (Friday)  - Worsened hyponatremia    Assessment & Plan             Dax Villareal is a 30 year old man with hx of alcohol use disorder admitted on 6/28/2021 after recent prolonged admission at [facility] who has severe acute alcoholic hepatitis c/b HE, ESRD requiring HD, and malnutrition in the setting of acute necrotizing pancreatitis, s/p PEG-J placement on 7/13. He arrived with acute respiratory failure and septic shock that have resolved, and his secondary bacterial peritonitis has been appropriately treated. He requires continued management for ESRD, hepatic encephalopathy, infected pancreatic pseudocyst, nutritional support via PEG-J, and persistent right pleural effusion. Clinically improving after multiple necrosectomies. Increasing leukocytosis and hypotension on 9/2 prompting resumption of IV antibiotics - has been stable since that time. Repeat CT with interval improvement in fluid collections.    Chronic pain  Patient with large opioid need 2/2 necrotizing pancreatitis.  Palliative care team on board, and is assisting with opioid taper.  Patient controlled on current regimen.  - Currently with dilaudid TID scheduled.  Will discontinue this tomorrow  - Will keep dilaudid q3h PRN for breakthrough pain    Hyponatremia  Patient with down-trending sodium the week of 8/27; likely due to substantial NG output and low solute intake and hepatic dysfunction. Stable 127-128. Then week of 9/27 hyponatremia (120), likely due to GI losses, nephrology following. Per nephrology, this is related to his chronic illness, ESRD, and liver disease and apart from maintaining a 1.5L fluid restriction there is  nothing to do for this.  Had worsening of sodium on 10/7 (122), though likely 2/2 the above, and this was measured before his HD session, which may have also contributed.  - Repeat BMP 10/7    VRE, pseudomonas, and candida in wound culture  Obtained from around G-tube site during a rapid response on 9/28 for lactic acidosis (2.1). Unclear utility of a wound culture, however it is growing multiple drug resistant bacteria. Discussed with ID curbside physician, given complex case and history will place formal consult. Per ID, no treatment needed.  - No antibiotics  - ID signed off    Asymptomatic hypotension   Hypovolemia  Patient with substantial daily NG output and poor PO. Also note significant weight loss over the past few weeks. He has needed intermittent boluses of normal saline for euvolemia. Have discussed with renal multiple times - please re-evaluate volume status vs dry weight as he is often hypotensive following fluid removal.  - Weight 78kg on 8/13. Then 55 kg, weight 68.5kg on 9/22. Concern for weight loss vs. targeting dry weight.  - Monitor vitals and output post dialysis, consider fluids prn.  - cont midodrine 15 TID with Nephrology    Necrotizing alcoholic pancreatitis w/ infected pseudocyst, improving  Hepatic Fluid collection, stable to improving  Secondary Bacterial Peritonitis 2/2 infected pseudocyst with VRE, improving  Septic Shock, resolved  Admitted to the [facility] ICU on 6/28/2021 from [facility] in [place] for septic shock due to necrotizing pancreatitis. CT on admit showing mature collection with enhancing wall measuring ~[id number]. IR placed perc drain 6/29. Repeat imaging 7/18 revealed walled off necrotic collection amenable to endoscopic transluminal drainage with cystgastrostomy stenting with necrosectomy on 7/21/21. Complete necrosectomy on 8/11. Antibiotics (cefepime and metronidazole) were discontinued on 8/25 after left flank drain removed, re-initiated in the setting of fever  on 9/2. Favor abdominal etiology, repeat CT on 9/7 with interval improvement in fluid collections - completed 7d course of abx per ID with EOT 9/8. Nausea & vomiting with PO intake is likely 2/2 complications from pancreatitis (gastric outlet obstruction vs duodenal stricture), patient is not a candidate for whipple at this time and is already on the alternative therapy which is g-tube decompression and GJ feeds. If overall health improves, could follow up with GI as an outpatient to consider whipple in future.  - Will likely need intermittent antibiotics with fluid collections occasionally with bacterial growth moving forward as to be expected in nec panc  - Palliative care following for pain management and goals of care given overall illness    G-tube Fistula  Dax had been having increasing G-tube output and was found to have an erythematous indurated area next to his G-tube insertion. This was found to be his G-tube balloon pushing through the skin. He underwent G-tube replacement on  9/13/21. His old G-tube site has since had large volume output requiring frequent wound care dressing changes.  - 9/20/21 - EGD to close fistula completed by GI  - Wound team consulted, follow their wound care recs    Periapical abscess: L 3rd maxillary molar  Dental Caries  - Dentistry consulted, and discussed with oral surgery  - Amoxicillin for 7d course (9/21-9/27)  - Dental extraction scheduled - 10/7 for evaluation on [facility]    Gastric Outlet Obstruction  High G tube output due to the cystogastric connection/fluid drainage noted on 8/5. Per GI, possibly related to gastric outlet from severe abdominal inflammation and is expected. Continues to have mild to moderate NG output - stable to improved. Relieved with draining G tube to gravity when experiencing pain/nausea.    Right Pancreatic Hydrothorax, stable  Increased dyspnea with CXR on 7/19 with further imaging and thoracentesis confirming a hepatic/pancreatic hydrothorax.  "Thoracentesis PRN for dyspnea, most recently 9/3.    ESRD due to ATN  Hyperphosphatemia  Hypokalemia  Stage III RADHA due to ATN from septic shock without recovery for >3 months. Line is tunneled dialysis catheter from [facility]. Nephrology dialyzed on Tu, Th, Sa schedule.  - Sevelemer powder TID with TF for hyperphosphatemia  - Scheduling 20mEq potassium through feeding tube daily     Anemia of chronic illness  Hematochezia, resolved  Hemoperitoneum  Baseline Hgb 17. Running between 6-8 in setting of chronic illness, new ESRD with epocrit per Nephrology, frequent lab draws and procedures. Hematochezia on 8/8 after starting standard intensity heparin, now resolved. Paracentesis on 8/21 with hemoperitoneum. No active bleeding. Required a unit of PRBC on 9/4 in the setting of several fluid boluses.   - Goal hgb > 7.0, trend Hgb     Alcohol Use Disorder  Counseled patient on complete alcohol cessation. Not interested in additional services at this time. Continue daily folic acid and thiamine.  - Ensure patient has resources for treatment at discharge if managing alcohol cessation independently becomes challenging.    Severe malnutrition of chronic illness  Poor oral intake in the setting of necrotizing pancreatitis. PEG tube placed 7/13, replaced 9/14. Continuous tube feeds with goal of 65 ml/hr with continuous pancreatic enzymes. Transitioning to renal formula.  - Continuous tube feeds  - Advancing diet as tolerated    Suspected sleep apnea  Patient requests supplemental oxygen at night as his friends have told him he may have sleep apnea and he feels that the oxygen helps him \"breathe lighter\". No hypoxia.  - Outpatient sleep study       Diet: Advance diet as tolerated  DVT Prophylaxis: SCDs  Rdz Catheter: Not present  Central Lines: None    Code Status: Full Code      Disposition Plan   Expected discharge: 7-8 days  recommended to long term care once adequate pain management/ tolerating PO medications and safe disposition " plan/ TCU bed available     The patient's care was discussed with the Attending Physician, Dr. Yu.    ABHILASH Chavez, PGY-2  Internal Medicine  [facility]    ______________________________________________________________________    Interval History   NAEON.  PRN dilaudid for back pain, which has been chronic.  No other complaints at this time.  Denies f/c, chest pain, SOB, abdominal pain, n/v/d, leg swelling.  Plans to go to dental appt at 1pm today.    Data reviewed today: I reviewed all medications, new labs and imaging results over the last 24 hours. I personally reviewed no images or EKG's today.    Physical Exam   Vital Signs: Temp: (!) 96.7  F (35.9  C) Temp src: Oral BP: 97/44 Pulse: 93   Resp: 18 SpO2: 97 % O2 Device: Nasal cannula Oxygen Delivery: 4 LPM  Weight: 144 lbs 6.42 oz     Constitutional: NAD, pleasant, cooperative.  Interactive with examiner.  Ill-appearing, unchanged during hospitalization.  HEENT: Sclera anicteric, Normal oropharynx without ulcers or exudate, MMM  CV: RRR, no murmurs, gallops or rubs. JVD normal   Respiratory: CTAB, no wheezing, crackles or rales. Non-labored  Abd: Soft, NT/ND, +bs, no HSM.  Dressings cdi around G-tube  Ext: Contractures in lower extremities  Skin: No lesions or rashes over exposed areas, normal color, texture and turgor  Neuro: AAO x 3

## 2021-10-07 NOTE — PLAN OF CARE
"Assumed cares 0700 - 1900    BP 90/42 (BP Location: Left arm)   Pulse 95   Temp (!) 96.7  F (35.9  C) (Oral)   Resp 18   Ht 1.676 m (5' 6\")   Wt 65.5 kg (144 lb 6.4 oz)   SpO2 97%   BMI 23.31 kg/m      A&Ox4. VSS on 6 L NC for comfort per pt request ex hypotensive. Endorsed pain in abdomen controlled with PRN and scheduled dilaudid and tylenol. Dyspnea on exertion. Denied N/V. Assist x1 with turns. Dialysis diet w/ a 1500 ml FR, poor appetite. G tube to gravity drainage bag, J tube TF @ 45 ml/hr. Old tube site dressing CDI. No IV access, MD aware. Dental appointment today.   "

## 2021-10-07 NOTE — PLAN OF CARE
Assumed cares 8500-5840    A&Ox4. Given PRN dilaudid for pain.Pt pleasant with cares. PEG dressing changed, J running tube feeds at  Goal rate of 45 ml/hr, G to gravity. No IV access. Pt has asymptomatic hypotension and on 4L NC per pt request. Ax1 when turning in bed, Ax2 if pt gets OOB. Pt to have dental appointment this afternoon with transportation already set up. Slept between cares. Able to make needs known.    Dental clinic appointment scheduled for this afternoon on [facility] at 1300.

## 2021-10-08 ENCOUNTER — APPOINTMENT (OUTPATIENT)
Dept: PHYSICAL THERAPY | Facility: CLINIC | Age: 31
End: 2021-10-08
Attending: INTERNAL MEDICINE
Payer: MEDICAID

## 2021-10-08 LAB
ANION GAP SERPL CALCULATED.3IONS-SCNC: 14 MMOL/L (ref 3–14)
ATRIAL RATE - MUSE: 100 BPM
BUN SERPL-MCNC: 62 MG/DL (ref 7–30)
CALCIUM SERPL-MCNC: 10.2 MG/DL (ref 8.5–10.1)
CHLORIDE BLD-SCNC: 86 MMOL/L (ref 94–109)
CO2 SERPL-SCNC: 24 MMOL/L (ref 20–32)
CREAT SERPL-MCNC: 4.81 MG/DL (ref 0.66–1.25)
DIASTOLIC BLOOD PRESSURE - MUSE: NORMAL MMHG
GFR SERPL CREATININE-BSD FRML MDRD: 15 ML/MIN/1.73M2
GLUCOSE BLD-MCNC: 114 MG/DL (ref 70–99)
INTERPRETATION ECG - MUSE: NORMAL
P AXIS - MUSE: 42 DEGREES
POTASSIUM BLD-SCNC: 3 MMOL/L (ref 3.4–5.3)
PR INTERVAL - MUSE: 156 MS
QRS DURATION - MUSE: 98 MS
QT - MUSE: 390 MS
QTC - MUSE: 503 MS
R AXIS - MUSE: -4 DEGREES
SARS-COV-2 RNA RESP QL NAA+PROBE: NEGATIVE
SODIUM SERPL-SCNC: 124 MMOL/L (ref 133–144)
SYSTOLIC BLOOD PRESSURE - MUSE: NORMAL MMHG
T AXIS - MUSE: 42 DEGREES
VENTRICULAR RATE- MUSE: 100 BPM

## 2021-10-08 PROCEDURE — 250N000013 HC RX MED GY IP 250 OP 250 PS 637: Performed by: INTERNAL MEDICINE

## 2021-10-08 PROCEDURE — 82310 ASSAY OF CALCIUM: CPT | Performed by: CLINICAL NURSE SPECIALIST

## 2021-10-08 PROCEDURE — 634N000001 HC RX 634: Performed by: CLINICAL NURSE SPECIALIST

## 2021-10-08 PROCEDURE — 250N000013 HC RX MED GY IP 250 OP 250 PS 637: Performed by: HOSPITALIST

## 2021-10-08 PROCEDURE — 97530 THERAPEUTIC ACTIVITIES: CPT | Mod: GP

## 2021-10-08 PROCEDURE — 36415 COLL VENOUS BLD VENIPUNCTURE: CPT | Performed by: CLINICAL NURSE SPECIALIST

## 2021-10-08 PROCEDURE — 120N000002 HC R&B MED SURG/OB UMMC

## 2021-10-08 PROCEDURE — 90937 HEMODIALYSIS REPEATED EVAL: CPT

## 2021-10-08 PROCEDURE — 97110 THERAPEUTIC EXERCISES: CPT | Mod: GP

## 2021-10-08 PROCEDURE — 250N000013 HC RX MED GY IP 250 OP 250 PS 637

## 2021-10-08 PROCEDURE — 90935 HEMODIALYSIS ONE EVALUATION: CPT | Performed by: INTERNAL MEDICINE

## 2021-10-08 PROCEDURE — 258N000003 HC RX IP 258 OP 636: Performed by: CLINICAL NURSE SPECIALIST

## 2021-10-08 PROCEDURE — 90832 PSYTX W PT 30 MINUTES: CPT | Mod: HN | Performed by: STUDENT IN AN ORGANIZED HEALTH CARE EDUCATION/TRAINING PROGRAM

## 2021-10-08 PROCEDURE — 99232 SBSQ HOSP IP/OBS MODERATE 35: CPT | Mod: GC | Performed by: INTERNAL MEDICINE

## 2021-10-08 PROCEDURE — 250N000013 HC RX MED GY IP 250 OP 250 PS 637: Performed by: STUDENT IN AN ORGANIZED HEALTH CARE EDUCATION/TRAINING PROGRAM

## 2021-10-08 RX ADMIN — SODIUM BICARBONATE 325 MG: 325 TABLET ORAL at 01:16

## 2021-10-08 RX ADMIN — Medication 40 MG: at 08:10

## 2021-10-08 RX ADMIN — Medication 6 MG: at 01:17

## 2021-10-08 RX ADMIN — METHYLPHENIDATE HYDROCHLORIDE 5 MG: 5 TABLET ORAL at 08:12

## 2021-10-08 RX ADMIN — Medication 5 ML: at 08:14

## 2021-10-08 RX ADMIN — SODIUM BICARBONATE 325 MG: 325 TABLET ORAL at 08:12

## 2021-10-08 RX ADMIN — PANCRELIPASE 2 CAPSULE: 24000; 76000; 120000 CAPSULE, DELAYED RELEASE PELLETS ORAL at 12:34

## 2021-10-08 RX ADMIN — Medication 1 PACKET: at 08:13

## 2021-10-08 RX ADMIN — MIDODRINE HYDROCHLORIDE 15 MG: 5 TABLET ORAL at 08:12

## 2021-10-08 RX ADMIN — Medication 1 PACKET: at 08:11

## 2021-10-08 RX ADMIN — FOLIC ACID 1 MG: 1 TABLET ORAL at 08:13

## 2021-10-08 RX ADMIN — SEVELAMER CARBONATE 1.6 G: 800 POWDER, FOR SUSPENSION ORAL at 21:14

## 2021-10-08 RX ADMIN — THIAMINE HCL TAB 100 MG 100 MG: 100 TAB at 08:13

## 2021-10-08 RX ADMIN — METHYLPHENIDATE HYDROCHLORIDE 5 MG: 5 TABLET ORAL at 12:36

## 2021-10-08 RX ADMIN — Medication 1 PACKET: at 19:36

## 2021-10-08 RX ADMIN — Medication 1 PACKET: at 13:48

## 2021-10-08 RX ADMIN — SODIUM CHLORIDE 250 ML: 9 INJECTION, SOLUTION INTRAVENOUS at 15:10

## 2021-10-08 RX ADMIN — SODIUM BICARBONATE 325 MG: 325 TABLET ORAL at 05:11

## 2021-10-08 RX ADMIN — EPOETIN ALFA-EPBX 5000 UNITS: 10000 INJECTION, SOLUTION INTRAVENOUS; SUBCUTANEOUS at 15:27

## 2021-10-08 RX ADMIN — CYCLOBENZAPRINE 10 MG: 10 TABLET, FILM COATED ORAL at 22:19

## 2021-10-08 RX ADMIN — HYDROMORPHONE HYDROCHLORIDE 1 MG: 1 SOLUTION ORAL at 10:05

## 2021-10-08 RX ADMIN — HYDROMORPHONE HYDROCHLORIDE 1 MG: 1 SOLUTION ORAL at 01:16

## 2021-10-08 RX ADMIN — GABAPENTIN 100 MG: 100 CAPSULE ORAL at 19:32

## 2021-10-08 RX ADMIN — SODIUM CHLORIDE 300 ML: 9 INJECTION, SOLUTION INTRAVENOUS at 15:10

## 2021-10-08 RX ADMIN — PROCHLORPERAZINE MALEATE 5 MG: 5 TABLET ORAL at 21:29

## 2021-10-08 RX ADMIN — SEVELAMER CARBONATE 1.6 G: 800 POWDER, FOR SUSPENSION ORAL at 08:13

## 2021-10-08 RX ADMIN — SODIUM BICARBONATE 325 MG: 325 TABLET ORAL at 19:32

## 2021-10-08 RX ADMIN — PANCRELIPASE 1 CAPSULE: 24000; 76000; 120000 CAPSULE, DELAYED RELEASE PELLETS ORAL at 05:11

## 2021-10-08 RX ADMIN — MIDODRINE HYDROCHLORIDE 15 MG: 5 TABLET ORAL at 19:31

## 2021-10-08 RX ADMIN — ACETAMINOPHEN 325 MG: 325 SOLUTION ORAL at 21:14

## 2021-10-08 RX ADMIN — HYDROXYZINE HYDROCHLORIDE 50 MG: 25 TABLET, FILM COATED ORAL at 16:28

## 2021-10-08 RX ADMIN — MIDODRINE HYDROCHLORIDE 15 MG: 5 TABLET ORAL at 13:45

## 2021-10-08 RX ADMIN — SODIUM BICARBONATE 325 MG: 325 TABLET ORAL at 12:35

## 2021-10-08 RX ADMIN — PANCRELIPASE 2 CAPSULE: 24000; 76000; 120000 CAPSULE, DELAYED RELEASE PELLETS ORAL at 08:10

## 2021-10-08 RX ADMIN — Medication: at 15:10

## 2021-10-08 RX ADMIN — PANCRELIPASE 1 CAPSULE: 24000; 76000; 120000 CAPSULE, DELAYED RELEASE PELLETS ORAL at 01:16

## 2021-10-08 RX ADMIN — ACETAMINOPHEN 325 MG: 325 SOLUTION ORAL at 08:10

## 2021-10-08 RX ADMIN — HYDROMORPHONE HYDROCHLORIDE 1 MG: 1 SOLUTION ORAL at 12:44

## 2021-10-08 RX ADMIN — Medication 6 MG: at 22:57

## 2021-10-08 RX ADMIN — HYDROMORPHONE HYDROCHLORIDE 1 MG: 1 SOLUTION ORAL at 06:22

## 2021-10-08 RX ADMIN — POTASSIUM CHLORIDE 20 MEQ: 1.5 POWDER, FOR SOLUTION ORAL at 08:13

## 2021-10-08 RX ADMIN — PANCRELIPASE 2 CAPSULE: 24000; 76000; 120000 CAPSULE, DELAYED RELEASE PELLETS ORAL at 19:32

## 2021-10-08 RX ADMIN — HYDROMORPHONE HYDROCHLORIDE 1 MG: 1 SOLUTION ORAL at 19:31

## 2021-10-08 ASSESSMENT — MIFFLIN-ST. JEOR
SCORE: 1532.75
SCORE: 1542.75

## 2021-10-08 ASSESSMENT — ACTIVITIES OF DAILY LIVING (ADL)
ADLS_ACUITY_SCORE: 15

## 2021-10-08 NOTE — PLAN OF CARE
Assumed cares 1900-0730    A&Ox4, able to make needs known. Ax1 while in bed, Ax2 when OOB. Given PRN dilaudid for pain with some relief, and PRN compazine for small bout of emesis x1. Triggered sepsis, LA resulted at 1.3. On regular diet with poor PO intake. PEG in place with J running tube feeds at goal rate of 45 ml/hr, G open to gravity. Slept most of night between cares. Plan for dialysis today.    Awaiting discharge placement.

## 2021-10-08 NOTE — CONSULTS
Health Psychology                                                                                                                          Margarita Loya, Ph.D., L.P [phone]  Mae Taylor, Ph.D., L.P. [phone]  Jennifer Reyez, Ph.D. [phone]  Kathy Ahumada, Ph.D., L.P. [phone]  Kirt Carrasco, Ph.D., A.B.P.P., L.P. [phone]         Lauren Tellez, Ph.D., L.P. [phone]                [facility], 3rd Floor  [address] [place] [address]    Inpatient Health Psychology Consultation    Date of Service:  10/8/21    SUBJECTIVE:  Met with Dxa to re-assess psychological symptoms, review progression of symptoms in interim, and discuss plans for discharge.    Symptoms reported: Dax reported good mood, low anxiety. Nausea and vomiting ongoing. Pain improving. He reported feeling optimistic about recovery and his plans.     Progress towards goals: Dax continues to use medications to manage any symptoms of anxiety that may present. He had a visit in dental clinic and is pleased about addressing his oral health as he said he is self-conscious of the appearance of his teeth.  Participation in therapies may be waxing and waning. Dax said he wishes he had more time to prepare for a therapy session, such as an hour.     Interventions utilized: Reviewed experiences in interim.  Encouraged ongoing participations in therapy as he will need to self-direct much of his recovery in the future and would benefit from as much structured support as he can have now. Reviewed factors which contributed to poor oral hygiene in the past and what to do moving forward. Validated his emotional experiences and discussed how avoiding difficult tasks often leads to bigger problems later. Reviewed factors which are in Dax's control regarding his recovery (I.e. participating in therapy, engaging in adaptive health behaviors) and what is not in his control.  Encouraged  him to exercise control when able, even if it requires him to request help from staff (I.e. help getting up in chair or help collecting materials he can use for self-guided exercise). Dax still plans to stay with two friends after discharge.  Encouraged strategies to create an alcohol free environment and encouraged connection to resources to maintain sobriety.     Dax was engaged throughout the visit and expressed understanding of information provided.       OBJECTIVE:  Appearance/Behavior/Orientation: Alert and oriented to person, place, time, and situation. No evidence of psychomotor agitation.    Cooperation/Reliability: Patient appeared to honestly respond to questions about psychosocial functioning and is deemed a reliable historian.   Cognition/Memory/Judgment: Not formally assessed, yet no cognitive difficulties on interview. Judgment fair.  Speech/Language: Speech was clear, logical and coherent, of normal rate, rhythm and volume.   Thought Content/Form: Appropriate to interview and situation. Overall logical and organized. .   Mood/Affect: Mood good; affect was mood congruent.    Appetite: Patient described good appetite.    Insight/Motivation: Limited insight, reports good motivation.   Suicide/Assault: Patient denies suicidal or assaultive ideation, plan, or intent.        ASSESSMENT:  Dax reported history of symptoms of anxiety that have been exacerbated by admission and acute health issues. His anxiety has improved and he continues to manage symptoms with atarax and a few coping skills. He has limited insight into factors which will facilitate maintaining abstinence from alcohol. He reports good motivation for participating in therapies but, again, more limited insight into what will be required for ongoing recovery.      DIAGNOSIS:  Unspecified anxiety disorder, rule out generalized anxiety disorder  Alcohol use disorder, severe, in early remission in controlled environment         PLAN:  Health  Psychology available if patient requests.    Jennifer Reyez, PhD  Health Psychology Fellow  Phone: [phone]    Time in: 2:00  Time out: 2:20

## 2021-10-08 NOTE — PROGRESS NOTES
"[facility] sent over EPIC order for need for \"HEWEDGE\" or forefoot off  bilaterally due to plantar flexion contractures. Patient is in Dialysis on Friday until 6:10 pm. Bed: NONE [facility] the [facility] chart. I called Dialysis and found out he was on 5B room 5236-01 and spoke with his nurse. ORTHOTICS DEPT will try to follow up on Monday to see where he is at and eval patient for what ever he needs.  Electronically signed George Mena , LPO.  "

## 2021-10-08 NOTE — PROGRESS NOTES
"Care Management Follow Up    Length of Stay (days): 102    Expected Discharge Date: 10/08/2021    Concerns to be Addressed: discharge planning     Patient plan of care discussed at interdisciplinary rounds: Yes     Anticipated Discharge Disposition: Skilled Nursing Facilty, Transitional Care     Anticipated Discharge Services:  (New outpatient dialysis)  Anticipated Discharge DME: None     Patient/family educated on Medicare website which has current facility and service quality ratings: no (Pt requesting TCU in or near [place])  Education Provided on the Discharge Plan:    Patient/Family in Agreement with the Plan: yes     Referrals Placed by CM/SW:    Private pay costs discussed: Not applicable     Additional Information:  Followed up with [facility] again this morning, contact [facility], Ph: [phone] and they do not have an open bed today, patient is on the \"ready list\" and they are looking out to next week. RNCC to remain avail for any further discharge planning.        Anais Stevenson RNCC  Pager: [phone]  Ph: [phone]     "

## 2021-10-08 NOTE — PROGRESS NOTES
"/54   Pulse 80   Temp 97.8  F (36.6  C) (Oral)   Resp 18   Ht 1.676 m (5' 6\")   Wt 64.5 kg (142 lb 3.2 oz)   SpO2 98%   BMI 22.95 kg/m       Pain: Chronic pain taking PRN medication as needed.   Neuro: A/Ox4  Respiratory: On O2 was stating well and did take off for small intervals this shift sating at 95% oxygen.  Cardiac/Neuro Vascular: WNL  GI/: Bmx1 this shift  Nutrition: TF to goal tolerating well.   Activity:In chair this shift  Skin: No issues noted, applied barrier to bottom for prevention  Lines: Dialysis only, no PIV  Events this shift: Refused lab this shift updated dialysis and they will get down there. Pt did sit in chair q 2hs.     Plan: Continue to monitor per plan of care.     "

## 2021-10-08 NOTE — PROGRESS NOTES
[facility]    Progress Note - [place] 3 Service  Date of Admission:  6/28/2021    Changes today:  - Medically ready for discharge to LTACH, awaiting placement  - Dialysis today  - Opiod taper today (discontinuing scheduled dilaudid, 1mg q3h prn only)  - EKG 10/7 with QTc 503  - Dental appt yesterday 10/7  - Hyponatremia improved today (125, from 122 yesterday)    Assessment & Plan             Dax Villareal is a 30 year old man with hx of alcohol use disorder admitted on 6/28/2021 after recent prolonged admission at [facility] who has severe acute alcoholic hepatitis c/b HE, ESRD requiring HD, and malnutrition in the setting of acute necrotizing pancreatitis, s/p PEG-J placement on 7/13. He arrived with acute respiratory failure and septic shock that have resolved, and his secondary bacterial peritonitis has been appropriately treated. He requires continued management for ESRD, hepatic encephalopathy, infected pancreatic pseudocyst, nutritional support via PEG-J, and persistent right pleural effusion. Clinically improving after multiple necrosectomies. Increasing leukocytosis and hypotension on 9/2 prompting resumption of IV antibiotics - has been stable since that time. Repeat CT with interval improvement in fluid collections.    Chronic pain  Patient with large opioid need 2/2 necrotizing pancreatitis.  Palliative care team on board, and is assisting with opioid taper.  Patient controlled on current regimen.  - Discontinued dilaudid PO TID  - Will keep dilaudid 1mg q3h PRN for breakthrough pain    Hyponatremia  Patient with down-trending sodium the week of 8/27; likely due to substantial NG output and low solute intake and hepatic dysfunction. Stable 127-128. Then week of 9/27 hyponatremia (120), likely due to GI losses, nephrology following. Per nephrology, this is related to his chronic illness, ESRD, and liver disease and apart from maintaining a 1.5L fluid  restriction there is nothing to do for this.  Had worsening of sodium on 10/7 (122), though likely 2/2 the above, and this was measured before his HD session, which may have also contributed.  - Repeat BMP 10/8    VRE, pseudomonas, and candida in wound culture  Obtained from around G-tube site during a rapid response on 9/28 for lactic acidosis (2.1). Unclear utility of a wound culture, however it is growing multiple drug resistant bacteria. Discussed with ID curbside physician, given complex case and history will place formal consult. Per ID, no treatment needed.  - No antibiotics  - ID signed off    Asymptomatic hypotension   Hypovolemia  Patient with substantial daily NG output and poor PO. Also note significant weight loss over the past few weeks. He has needed intermittent boluses of normal saline for euvolemia. Have discussed with renal multiple times - please re-evaluate volume status vs dry weight as he is often hypotensive following fluid removal.  - Weight 78kg on 8/13. Then 55 kg, weight 68.5kg on 9/22. Concern for weight loss vs. targeting dry weight.  - Monitor vitals and output post dialysis, consider fluids prn.  - cont midodrine 15 TID with Nephrology    Necrotizing alcoholic pancreatitis w/ infected pseudocyst, improving  Hepatic Fluid collection, stable to improving  Secondary Bacterial Peritonitis 2/2 infected pseudocyst with VRE, improving  Septic Shock, resolved  Admitted to the [facility] ICU on 6/28/2021 from [facility] in [place] for septic shock due to necrotizing pancreatitis. CT on admit showing mature collection with enhancing wall measuring ~[id number]. IR placed perc drain 6/29. Repeat imaging 7/18 revealed walled off necrotic collection amenable to endoscopic transluminal drainage with cystgastrostomy stenting with necrosectomy on 7/21/21. Complete necrosectomy on 8/11. Antibiotics (cefepime and metronidazole) were discontinued on 8/25 after left flank drain removed, re-initiated in  the setting of fever on 9/2. Favor abdominal etiology, repeat CT on 9/7 with interval improvement in fluid collections - completed 7d course of abx per ID with EOT 9/8. Nausea & vomiting with PO intake is likely 2/2 complications from pancreatitis (gastric outlet obstruction vs duodenal stricture), patient is not a candidate for whipple at this time and is already on the alternative therapy which is g-tube decompression and GJ feeds. If overall health improves, could follow up with GI as an outpatient to consider whipple in future.  - Will likely need intermittent antibiotics with fluid collections occasionally with bacterial growth moving forward as to be expected in nec panc  - Palliative care following for pain management and goals of care given overall illness    G-tube Fistula  Dax had been having increasing G-tube output and was found to have an erythematous indurated area next to his G-tube insertion. This was found to be his G-tube balloon pushing through the skin. He underwent G-tube replacement on  9/13/21. His old G-tube site has since had large volume output requiring frequent wound care dressing changes.  - 9/20/21 - EGD to close fistula completed by GI  - Wound team consulted, follow their wound care recs    Periapical abscess: L 3rd maxillary molar  Dental Caries  - Dentistry consulted, and discussed with oral surgery  - Amoxicillin for 7d course (9/21-9/27)  - Dental extraction scheduled - 10/7 for evaluation on [facility]    Gastric Outlet Obstruction  High G tube output due to the cystogastric connection/fluid drainage noted on 8/5. Per GI, possibly related to gastric outlet from severe abdominal inflammation and is expected. Continues to have mild to moderate NG output - stable to improved. Relieved with draining G tube to gravity when experiencing pain/nausea.    Right Pancreatic Hydrothorax, stable  Increased dyspnea with CXR on 7/19 with further imaging and thoracentesis confirming a  "hepatic/pancreatic hydrothorax. Thoracentesis PRN for dyspnea, most recently 9/3.    ESRD due to ATN  Hyperphosphatemia  Hypokalemia  Stage III RADHA due to ATN from septic shock without recovery for >3 months. Line is tunneled dialysis catheter from \[facility]\"". Nephrology dialyzed on Tu, Th, Sa schedule.  - Sevelemer powder TID with TF for hyperphosphatemia  - Scheduling 20mEq potassium through feeding tube daily     Anemia of chronic illness  Hematochezia, resolved  Hemoperitoneum  Baseline Hgb 17. Running between 6-8 in setting of chronic illness, new ESRD with epocrit per Nephrology, frequent lab draws and procedures. Hematochezia on 8/8 after starting standard intensity heparin, now resolved. Paracentesis on 8/21 with hemoperitoneum. No active bleeding. Required a unit of PRBC on 9/4 in the setting of several fluid boluses.   - Goal hgb > 7.0, trend Hgb     Alcohol Use Disorder  Counseled patient on complete alcohol cessation. Not interested in additional services at this time. Continue daily folic acid and thiamine.  - Ensure patient has resources for treatment at discharge if managing alcohol cessation independently becomes challenging.    Severe malnutrition of chronic illness  Poor oral intake in the setting of necrotizing pancreatitis. PEG tube placed 7/13, replaced 9/14. Continuous tube feeds with goal of 65 ml/hr with continuous pancreatic enzymes. Transitioning to renal formula.  - Continuous tube feeds  - Advancing diet as tolerated    Suspected sleep apnea  Patient requests supplemental oxygen at night as his friends have told him he may have sleep apnea and he feels that the oxygen helps him \"breathe lighter\". No hypoxia.  - Outpatient sleep study       Diet: Advance diet as tolerated  DVT Prophylaxis: SCDs  Rdz Catheter: Not present  Central Lines: None    Code Status: Full Code      Disposition Plan   Expected discharge: 7-8 days  recommended to long term care once adequate pain management/ tolerating PO " medications and safe disposition plan/ TCU bed available     The patient's care was discussed with the Attending Physician, Dr. Yu.    Marleny Monk, PGY-1  Internal Medicine-Pediatrics  [facility]    ______________________________________________________________________    Interval History   NAEON.  Patient reports some difficulty with discontinuing the scheduled dilaudid yesterday, however said he was still able to sleep reasonably well.    Data reviewed today: I reviewed all medications, new labs and imaging results over the last 24 hours. I personally reviewed no images or EKG's today.    Physical Exam   Vital Signs: Temp: 98.3  F (36.8  C) Temp src: Oral BP: (!) 91/30 Pulse: 88   Resp: 18 SpO2: 92 % O2 Device: Nasal cannula with humidification Oxygen Delivery: 6 LPM  Weight: 142 lbs 3.15 oz     Constitutional: NAD, pleasant, cooperative.  Interactive with examiner.  Ill-appearing, unchanged during hospitalization.  HEENT: Sclera anicteric, Normal oropharynx without ulcers or exudate, MMM  CV: RRR, no murmurs, gallops or rubs. JVD normal   Respiratory: CTAB, no wheezing, crackles or rales. Non-labored  Abd: Soft, NT/ND, +bs, no HSM.  Dressings cdi around G-tube  Ext: Contractures in lower extremities  Skin: No lesions or rashes over exposed areas, normal color, texture and turgor  Neuro: AAO x 3

## 2021-10-08 NOTE — PROGRESS NOTES
HEMODIALYSIS TREATMENT NOTE    Date: 10/8/2021  Time: 4:18 PM    Data:  Pre Wt: 64.5 kg (142 lb 3.2 oz)   Desired Wt: 63.5 kg   Post Wt: 63.5 kg (139 lb 15.9 oz)  Weight change: 1 kg  Ultrafiltration - Post Run Net Total Removed (mL): 1000 mL  Vascular Access Status: CVC  patent  Dialyzer Rinse: Clear  Total Blood Volume Processed: 58.94 L   Total Dialysis (Treatment) Time: 3   Dialysate Bath: K 4, Ca 3  Heparin: None    Lab:   Yes; BMP    Interventions:  Pt had a stable uncomplicated 3 hours of HD. 1L of fluid was pulled per order. Atarax administered per pt request. Epogen also administered as ordered. CVC dressing was changed as due with no unusual findings noted. Ending BP was 109/64. Pt rinsed back post tx, lumen were saline locked, end caps changed, and hand off report given to ROB Umanzor.    Assessment:  -Pt remain on 5L 02 via NC per his request for c/o SOB on exertion  -VSS  -A & O X 4     Plan:    Per renal

## 2021-10-09 PROCEDURE — 99232 SBSQ HOSP IP/OBS MODERATE 35: CPT | Mod: GC | Performed by: INTERNAL MEDICINE

## 2021-10-09 PROCEDURE — 250N000013 HC RX MED GY IP 250 OP 250 PS 637

## 2021-10-09 PROCEDURE — 250N000013 HC RX MED GY IP 250 OP 250 PS 637: Performed by: INTERNAL MEDICINE

## 2021-10-09 PROCEDURE — 120N000002 HC R&B MED SURG/OB UMMC

## 2021-10-09 RX ADMIN — METHYLPHENIDATE HYDROCHLORIDE 5 MG: 5 TABLET ORAL at 07:53

## 2021-10-09 RX ADMIN — Medication 5 ML: at 08:35

## 2021-10-09 RX ADMIN — SODIUM BICARBONATE 325 MG: 325 TABLET ORAL at 13:49

## 2021-10-09 RX ADMIN — HYDROMORPHONE HYDROCHLORIDE 1 MG: 1 SOLUTION ORAL at 07:48

## 2021-10-09 RX ADMIN — PROCHLORPERAZINE MALEATE 5 MG: 5 TABLET ORAL at 11:29

## 2021-10-09 RX ADMIN — POTASSIUM CHLORIDE 20 MEQ: 1.5 POWDER, FOR SOLUTION ORAL at 08:36

## 2021-10-09 RX ADMIN — SEVELAMER CARBONATE 1.6 G: 800 POWDER, FOR SUSPENSION ORAL at 16:14

## 2021-10-09 RX ADMIN — MIDODRINE HYDROCHLORIDE 15 MG: 5 TABLET ORAL at 08:35

## 2021-10-09 RX ADMIN — PROCHLORPERAZINE MALEATE 5 MG: 5 TABLET ORAL at 18:31

## 2021-10-09 RX ADMIN — FOLIC ACID 1 MG: 1 TABLET ORAL at 08:35

## 2021-10-09 RX ADMIN — HYDROXYZINE HYDROCHLORIDE 50 MG: 25 TABLET, FILM COATED ORAL at 19:53

## 2021-10-09 RX ADMIN — SODIUM BICARBONATE 325 MG: 325 TABLET ORAL at 07:46

## 2021-10-09 RX ADMIN — THIAMINE HCL TAB 100 MG 100 MG: 100 TAB at 08:35

## 2021-10-09 RX ADMIN — Medication 40 MG: at 08:35

## 2021-10-09 RX ADMIN — HYDROMORPHONE HYDROCHLORIDE 1 MG: 1 SOLUTION ORAL at 18:23

## 2021-10-09 RX ADMIN — CYCLOBENZAPRINE 10 MG: 10 TABLET, FILM COATED ORAL at 13:48

## 2021-10-09 RX ADMIN — SEVELAMER CARBONATE 1.6 G: 800 POWDER, FOR SUSPENSION ORAL at 08:36

## 2021-10-09 RX ADMIN — SODIUM BICARBONATE 325 MG: 325 TABLET ORAL at 19:54

## 2021-10-09 RX ADMIN — Medication 1 PACKET: at 20:22

## 2021-10-09 RX ADMIN — HYDROMORPHONE HYDROCHLORIDE 1 MG: 1 SOLUTION ORAL at 04:29

## 2021-10-09 RX ADMIN — HYDROMORPHONE HYDROCHLORIDE 1 MG: 1 SOLUTION ORAL at 11:29

## 2021-10-09 RX ADMIN — SODIUM BICARBONATE 325 MG: 325 TABLET ORAL at 00:20

## 2021-10-09 RX ADMIN — METHYLPHENIDATE HYDROCHLORIDE 5 MG: 5 TABLET ORAL at 13:49

## 2021-10-09 RX ADMIN — HYDROXYZINE HYDROCHLORIDE 50 MG: 25 TABLET, FILM COATED ORAL at 13:54

## 2021-10-09 RX ADMIN — PANCRELIPASE 2 CAPSULE: 24000; 76000; 120000 CAPSULE, DELAYED RELEASE PELLETS ORAL at 13:46

## 2021-10-09 RX ADMIN — PANCRELIPASE 2 CAPSULE: 24000; 76000; 120000 CAPSULE, DELAYED RELEASE PELLETS ORAL at 08:34

## 2021-10-09 RX ADMIN — PANCRELIPASE 2 CAPSULE: 24000; 76000; 120000 CAPSULE, DELAYED RELEASE PELLETS ORAL at 00:20

## 2021-10-09 RX ADMIN — MIDODRINE HYDROCHLORIDE 15 MG: 5 TABLET ORAL at 19:53

## 2021-10-09 RX ADMIN — SODIUM BICARBONATE 325 MG: 325 TABLET ORAL at 16:14

## 2021-10-09 RX ADMIN — ACETAMINOPHEN 325 MG: 325 SOLUTION ORAL at 16:14

## 2021-10-09 RX ADMIN — HYDROMORPHONE HYDROCHLORIDE 1 MG: 1 SOLUTION ORAL at 01:02

## 2021-10-09 RX ADMIN — Medication 1 PACKET: at 08:36

## 2021-10-09 RX ADMIN — PANCRELIPASE 2 CAPSULE: 24000; 76000; 120000 CAPSULE, DELAYED RELEASE PELLETS ORAL at 04:17

## 2021-10-09 RX ADMIN — SODIUM BICARBONATE 325 MG: 325 TABLET ORAL at 04:16

## 2021-10-09 RX ADMIN — HYDROMORPHONE HYDROCHLORIDE 1 MG: 1 SOLUTION ORAL at 14:10

## 2021-10-09 RX ADMIN — MIDODRINE HYDROCHLORIDE 15 MG: 5 TABLET ORAL at 13:49

## 2021-10-09 RX ADMIN — SEVELAMER CARBONATE 1.6 G: 800 POWDER, FOR SUSPENSION ORAL at 21:36

## 2021-10-09 RX ADMIN — PANCRELIPASE 2 CAPSULE: 24000; 76000; 120000 CAPSULE, DELAYED RELEASE PELLETS ORAL at 19:54

## 2021-10-09 RX ADMIN — HYDROMORPHONE HYDROCHLORIDE 1 MG: 1 SOLUTION ORAL at 21:36

## 2021-10-09 RX ADMIN — ACETAMINOPHEN 325 MG: 325 SOLUTION ORAL at 20:22

## 2021-10-09 RX ADMIN — PANCRELIPASE 2 CAPSULE: 24000; 76000; 120000 CAPSULE, DELAYED RELEASE PELLETS ORAL at 16:13

## 2021-10-09 ASSESSMENT — ACTIVITIES OF DAILY LIVING (ADL)
ADLS_ACUITY_SCORE: 15

## 2021-10-09 ASSESSMENT — MIFFLIN-ST. JEOR: SCORE: 1562.75

## 2021-10-09 NOTE — PROGRESS NOTES
[facility]    Progress Note - Daniela 3 Service  Date of Admission:  6/28/2021    Changes today:  - Medically ready for discharge to LTACH, awaiting placement  - Labs MWF with dialysis    Assessment & Plan             Dax Villareal is a 30 year old man with hx of alcohol use disorder admitted on 6/28/2021 after recent prolonged admission at [facility] in [place] who has severe acute alcoholic hepatitis c/b HE, ESRD requiring HD, and malnutrition in the setting of acute necrotizing pancreatitis, s/p PEG-J placement on 7/13. He arrived with acute respiratory failure and septic shock that have resolved, and his secondary bacterial peritonitis has been appropriately treated. He requires continued management for ESRD, hepatic encephalopathy, infected pancreatic pseudocyst, nutritional support via PEG-J, and persistent right pleural effusion. Clinically improving after multiple necrosectomies. Increasing leukocytosis and hypotension on 9/2 prompting resumption of IV antibiotics - has been stable since that time. Repeat CT with interval improvement in fluid collections.    Chronic pain  Patient with large opioid need 2/2 necrotizing pancreatitis.  Palliative care team on board, and is assisting with opioid taper.  Patient controlled on current regimen.  - Discontinued dilaudid PO TID  - Will keep dilaudid 1mg q3h PRN for breakthrough pain  - Taper plan (slow version dates below, see palliative care note for more details):   -10/8 discontinue 1mg TID scheduled hydromorphone   -10/18 decrease hydromorphone to 1mg q4h prn (from q3h)   -11/01 decrease hydromorphone to 1mg QID prn   -11/15 decrease hydromorphone to 1mg TID prn (continue this until no longer on opioids)  - Gabapentin 100mg post-dialysis (three times per week) (per palliative, if tolerates without sedation, could uptitrate to 300mg 3 times per week after dialysis)    Hyponatremia  Patient with down-trending sodium the  week of 8/27; likely due to substantial NG output and low solute intake and hepatic dysfunction. Stable 127-128. Then week of 9/27 hyponatremia (120), likely due to GI losses, nephrology following. Per nephrology, this is related to his chronic illness, ESRD, and liver disease and apart from maintaining a 1.5L fluid restriction there is nothing to do for this.  Had worsening of sodium on 10/7 (122), though likely 2/2 the above, and this was measured before his HD session, which may have also contributed.  - Repeat BMP 10/8    VRE, pseudomonas, and candida in wound culture  Obtained from around G-tube site during a rapid response on 9/28 for lactic acidosis (2.1). Unclear utility of a wound culture, however it is growing multiple drug resistant bacteria. Discussed with ID curbside physician, given complex case and history will place formal consult. Per ID, no treatment needed.  - No antibiotics  - ID signed off    Asymptomatic hypotension   Hypovolemia  Patient with substantial daily NG output and poor PO. Also note significant weight loss over the past few weeks. He has needed intermittent boluses of normal saline for euvolemia. Have discussed with renal multiple times - please re-evaluate volume status vs dry weight as he is often hypotensive following fluid removal.  - Weight 78kg on 8/13. Then 55 kg, weight 68.5kg on 9/22. Concern for weight loss vs. targeting dry weight.  - Monitor vitals and output post dialysis, consider fluids prn.  - cont midodrine 15 TID with Nephrology    Necrotizing alcoholic pancreatitis w/ infected pseudocyst, improving  Hepatic Fluid collection, stable to improving  Secondary Bacterial Peritonitis 2/2 infected pseudocyst with VRE, improving  Septic Shock, resolved  Admitted to the [facility] ICU on 6/28/2021 from [facility] in [place] for septic shock due to necrotizing pancreatitis. CT on admit showing mature collection with enhancing wall measuring ~[id number]. IR placed perc drain  6/29. Repeat imaging 7/18 revealed walled off necrotic collection amenable to endoscopic transluminal drainage with cystgastrostomy stenting with necrosectomy on 7/21/21. Complete necrosectomy on 8/11. Antibiotics (cefepime and metronidazole) were discontinued on 8/25 after left flank drain removed, re-initiated in the setting of fever on 9/2. Favor abdominal etiology, repeat CT on 9/7 with interval improvement in fluid collections - completed 7d course of abx per ID with EOT 9/8. Nausea & vomiting with PO intake is likely 2/2 complications from pancreatitis (gastric outlet obstruction vs duodenal stricture), patient is not a candidate for whipple at this time and is already on the alternative therapy which is g-tube decompression and GJ feeds. If overall health improves, could follow up with GI as an outpatient to consider whipple in future.  - Will likely need intermittent antibiotics with fluid collections occasionally with bacterial growth moving forward as to be expected in nec panc  - Palliative care following for pain management and goals of care given overall illness    G-tube Fistula  Dax had been having increasing G-tube output and was found to have an erythematous indurated area next to his G-tube insertion. This was found to be his G-tube balloon pushing through the skin. He underwent G-tube replacement on  9/13/21. His old G-tube site has since had large volume output requiring frequent wound care dressing changes.  - 9/20/21 - EGD to close fistula completed by GI  - Wound team consulted, follow their wound care recs    Periapical abscess: L 3rd maxillary molar  Dental Caries  - Dentistry consulted, and discussed with oral surgery  - Amoxicillin for 7d course (9/21-9/27)  - Dental extraction scheduled - 10[place] [facility]    Gastric Outlet Obstruction  High G tube output due to the cystogastric connection/fluid drainage noted on 8/5. Per GI, possibly related to gastric outlet from severe  "abdominal inflammation and is expected. Continues to have mild to moderate NG output - stable to improved. Relieved with draining G tube to gravity when experiencing pain/nausea.    Right Pancreatic Hydrothorax, stable  Increased dyspnea with CXR on 7/19 with further imaging and thoracentesis confirming a hepatic/pancreatic hydrothorax. Thoracentesis PRN for dyspnea, most recently 9/3.    ESRD due to ATN  Hyperphosphatemia  Hypokalemia  Stage III RADHA due to ATN from septic shock without recovery for >3 months. Line is tunneled dialysis catheter from [facility]. Nephrology dialyzed on Tu, Th, Sa schedule.  - Sevelemer powder TID with TF for hyperphosphatemia  - Scheduling 20mEq potassium through feeding tube daily     Anemia of chronic illness  Hematochezia, resolved  Hemoperitoneum  Baseline Hgb 17. Running between 6-8 in setting of chronic illness, new ESRD with epocrit per Nephrology, frequent lab draws and procedures. Hematochezia on 8/8 after starting standard intensity heparin, now resolved. Paracentesis on 8/21 with hemoperitoneum. No active bleeding. Required a unit of PRBC on 9/4 in the setting of several fluid boluses.   - Goal hgb > 7.0, trend Hgb     Alcohol Use Disorder  Counseled patient on complete alcohol cessation. Not interested in additional services at this time. Continue daily folic acid and thiamine.  - Ensure patient has resources for treatment at discharge if managing alcohol cessation independently becomes challenging.    Severe malnutrition of chronic illness  Poor oral intake in the setting of necrotizing pancreatitis. PEG tube placed 7/13, replaced 9/14. Continuous tube feeds with goal of 65 ml/hr with continuous pancreatic enzymes. Transitioning to renal formula.  - Continuous tube feeds  - Advancing diet as tolerated    Suspected sleep apnea  Patient requests supplemental oxygen at night as his friends have told him he may have sleep apnea and he feels that the oxygen helps him \"breathe " "lighter\". No hypoxia.  - Outpatient sleep study       Diet: Advance diet as tolerated  DVT Prophylaxis: SCDs  Rdz Catheter: Not present  Central Lines: None    Code Status: Full Code      Disposition Plan   Expected discharge: 7-8 days  recommended to long term care once adequate pain management/ tolerating PO medications and safe disposition plan/ TCU bed available     The patient's care was discussed with the Attending Physician, Dr. Yu.    Marleny Monk, PGY-1  Internal Medicine-Pediatrics  [facility]    ______________________________________________________________________    Interval History   NAEON.  Patient reports his pain is \"off and on\". Notes no new symptoms. Feeling tired and wanted to sleep more.    Data reviewed today: I reviewed all medications, new labs and imaging results over the last 24 hours. I personally reviewed no images or EKG's today.    Physical Exam   Vital Signs: Temp: 97.1  F (36.2  C) Temp src: Oral BP: 92/41 Pulse: 109   Resp: 16 SpO2: 92 % O2 Device: None (Room air) Oxygen Delivery: 5 LPM  Weight: 139 lbs 15.87 oz     Constitutional: Sleeping, easily awoken, NAD, pleasant, cooperative.  Interactive with examiner.  Ill-appearing, unchanged during hospitalization.  HEENT: Sclera anicteric, Normal oropharynx without ulcers or exudate, MMM  CV: RRR, no murmurs, gallops or rubs. JVD normal   Respiratory: CTAB, no wheezing, crackles or rales. Non-labored  Abd: Soft, NT/ND, +bs, no HSM.  Dressings cdi around G-tube  Ext: Contractures in lower extremities  Skin: No lesions or rashes over exposed areas, normal color, texture and turgor  Neuro: AAO x 3  "

## 2021-10-09 NOTE — PLAN OF CARE
Pt returned to unit from dialysis at 1900. cares were resumed until 2330. Pt was checked hourly on shift     Pt A&O x4. Soft BP (90s/30s; within parameters) other VSS on 2.5L NC. Pt denies chest pain. C/O SOB on exertion; tolerable with 2.5L NC. C/O generalize ABD pain radiating to back; oral dilaudid & flexeril given via Jtube; all meds through Jtube. Jtube infusing TF at 45mL/hr. Gtube OTG; 200mL output. Emesis x1 on shift; 200mL. BM x1 on shift; minimal urine output on shift; on dialysis; CVC in-place. Pt appears to be resting between cares.     Plan of Care: Continuing monitoring patient and notify MD of any changes.    Erna Eubanks RN on 10/8/2021 at 10:41 PM

## 2021-10-09 NOTE — PLAN OF CARE
RN assumed cares at [phone]     Temp: 97.1  F (36.2  C) Temp src: Oral BP: 97/42 Pulse: 106   Resp: 18 SpO2: 94 % O2 Device: Nasal cannula Oxygen Delivery: 5 LPM    Neuro: A&O x4. Able to make needs known.   Cardiac: Soft BP, MD aware.   Respiratory: LS clear bilaterally. Denies SOB.  GI/: HD pt. Pt denied nausea overnight. No emesis this shift. G tube draining by gravity, J tube infusing 45 mL/hr, tolerating well. No output this shift.   Skin: Abdominal dressing needs changing d/t increased drainage, pt refused change this shift d/t inadequate pain control. Pt repositioned x1.   IV/Drains: R CVC WDL.   Activity: Assist of 1 in bed, 2 out of bed.   Nutrition: Dialysis diet, poor appetite.   Pain: Pt c/o 8/10 abdominal & back pain somewhat relieved w/ prn dilaudid.     Plan of Care: Pt resting comfortably in room. Continue to reposition as pt will allow. Monitor for N/V & update team w/ any concerns.

## 2021-10-09 NOTE — PROGRESS NOTES
Pt A/Ox4 VSS. Pt TF 45 mLs need to add TF q 4hrs enzymes and bicarb added. Pt had dressings changed to PEG and old PEG site area is red with green discharge noted to dressing. Pt repositioned this shift.Barrier added to coccyx no open areas noted this shift. Pt has had pain and has taken PRN pain medications as well as had Atarax, and Flexeril this shift. Pt did have emesis x1 and was given PRN compazine not further emesis this shift. Pt had BM x2. Pt did not eat am or noon meal this shift. Continue to monitor per plan of care.

## 2021-10-09 NOTE — PROVIDER NOTIFICATION
Daniela [facility] intern:   5B: 236: KR  pt had emesis x1 (200mL), gtube output 200mL. OK to continue TF. thanks  [phone]    MD called back stating if Pt is tolerating TF ok to continue. Pt is tolerating TF, TF still infusing at 45mL/hr   Erna Eubanks RN on 10/8/2021 at 10:48 PM

## 2021-10-10 PROCEDURE — 120N000002 HC R&B MED SURG/OB UMMC

## 2021-10-10 PROCEDURE — 250N000013 HC RX MED GY IP 250 OP 250 PS 637: Performed by: INTERNAL MEDICINE

## 2021-10-10 PROCEDURE — 250N000013 HC RX MED GY IP 250 OP 250 PS 637

## 2021-10-10 PROCEDURE — 250N000013 HC RX MED GY IP 250 OP 250 PS 637: Performed by: HOSPITALIST

## 2021-10-10 PROCEDURE — 99232 SBSQ HOSP IP/OBS MODERATE 35: CPT | Mod: GC | Performed by: INTERNAL MEDICINE

## 2021-10-10 RX ADMIN — PROCHLORPERAZINE MALEATE 5 MG: 5 TABLET ORAL at 13:40

## 2021-10-10 RX ADMIN — HYDROMORPHONE HYDROCHLORIDE 1 MG: 1 SOLUTION ORAL at 09:43

## 2021-10-10 RX ADMIN — PANCRELIPASE 2 CAPSULE: 24000; 76000; 120000 CAPSULE, DELAYED RELEASE PELLETS ORAL at 16:30

## 2021-10-10 RX ADMIN — Medication 40 MG: at 09:42

## 2021-10-10 RX ADMIN — PANCRELIPASE 2 CAPSULE: 24000; 76000; 120000 CAPSULE, DELAYED RELEASE PELLETS ORAL at 13:09

## 2021-10-10 RX ADMIN — CYCLOBENZAPRINE 10 MG: 10 TABLET, FILM COATED ORAL at 20:20

## 2021-10-10 RX ADMIN — PROCHLORPERAZINE MALEATE 5 MG: 5 TABLET ORAL at 20:20

## 2021-10-10 RX ADMIN — THIAMINE HCL TAB 100 MG 100 MG: 100 TAB at 09:43

## 2021-10-10 RX ADMIN — SODIUM BICARBONATE 325 MG: 325 TABLET ORAL at 13:08

## 2021-10-10 RX ADMIN — MIDODRINE HYDROCHLORIDE 15 MG: 5 TABLET ORAL at 13:08

## 2021-10-10 RX ADMIN — Medication 1 PACKET: at 20:21

## 2021-10-10 RX ADMIN — METHYLPHENIDATE HYDROCHLORIDE 5 MG: 5 TABLET ORAL at 13:08

## 2021-10-10 RX ADMIN — HYDROXYZINE HYDROCHLORIDE 50 MG: 25 TABLET, FILM COATED ORAL at 16:30

## 2021-10-10 RX ADMIN — PANCRELIPASE 2 CAPSULE: 24000; 76000; 120000 CAPSULE, DELAYED RELEASE PELLETS ORAL at 20:20

## 2021-10-10 RX ADMIN — PANCRELIPASE 2 CAPSULE: 24000; 76000; 120000 CAPSULE, DELAYED RELEASE PELLETS ORAL at 09:41

## 2021-10-10 RX ADMIN — HYDROMORPHONE HYDROCHLORIDE 1 MG: 1 SOLUTION ORAL at 13:09

## 2021-10-10 RX ADMIN — MIDODRINE HYDROCHLORIDE 15 MG: 5 TABLET ORAL at 20:20

## 2021-10-10 RX ADMIN — Medication 1 PACKET: at 09:44

## 2021-10-10 RX ADMIN — Medication 5 ML: at 09:42

## 2021-10-10 RX ADMIN — SODIUM BICARBONATE 325 MG: 325 TABLET ORAL at 00:42

## 2021-10-10 RX ADMIN — SODIUM BICARBONATE 325 MG: 325 TABLET ORAL at 04:46

## 2021-10-10 RX ADMIN — HYDROMORPHONE HYDROCHLORIDE 1 MG: 1 SOLUTION ORAL at 20:20

## 2021-10-10 RX ADMIN — SEVELAMER CARBONATE 1.6 G: 800 POWDER, FOR SUSPENSION ORAL at 16:30

## 2021-10-10 RX ADMIN — POTASSIUM CHLORIDE 20 MEQ: 1.5 POWDER, FOR SOLUTION ORAL at 09:43

## 2021-10-10 RX ADMIN — HYDROMORPHONE HYDROCHLORIDE 1 MG: 1 SOLUTION ORAL at 16:30

## 2021-10-10 RX ADMIN — SODIUM BICARBONATE 325 MG: 325 TABLET ORAL at 16:35

## 2021-10-10 RX ADMIN — PANCRELIPASE 2 CAPSULE: 24000; 76000; 120000 CAPSULE, DELAYED RELEASE PELLETS ORAL at 09:45

## 2021-10-10 RX ADMIN — MIDODRINE HYDROCHLORIDE 15 MG: 5 TABLET ORAL at 09:42

## 2021-10-10 RX ADMIN — HYDROMORPHONE HYDROCHLORIDE 1 MG: 1 SOLUTION ORAL at 00:43

## 2021-10-10 RX ADMIN — PROCHLORPERAZINE MALEATE 5 MG: 5 TABLET ORAL at 09:43

## 2021-10-10 RX ADMIN — SEVELAMER CARBONATE 1.6 G: 800 POWDER, FOR SUSPENSION ORAL at 09:43

## 2021-10-10 RX ADMIN — SODIUM BICARBONATE 325 MG: 325 TABLET ORAL at 09:42

## 2021-10-10 RX ADMIN — SODIUM BICARBONATE 325 MG: 325 TABLET ORAL at 20:19

## 2021-10-10 RX ADMIN — HYDROMORPHONE HYDROCHLORIDE 1 MG: 1 SOLUTION ORAL at 06:50

## 2021-10-10 RX ADMIN — PANCRELIPASE 2 CAPSULE: 24000; 76000; 120000 CAPSULE, DELAYED RELEASE PELLETS ORAL at 04:46

## 2021-10-10 RX ADMIN — PANCRELIPASE 2 CAPSULE: 24000; 76000; 120000 CAPSULE, DELAYED RELEASE PELLETS ORAL at 00:42

## 2021-10-10 RX ADMIN — HYDROMORPHONE HYDROCHLORIDE 1 MG: 1 SOLUTION ORAL at 03:43

## 2021-10-10 RX ADMIN — SEVELAMER CARBONATE 1.6 G: 800 POWDER, FOR SUSPENSION ORAL at 20:20

## 2021-10-10 RX ADMIN — FOLIC ACID 1 MG: 1 TABLET ORAL at 09:43

## 2021-10-10 RX ADMIN — Medication 1 PACKET: at 13:10

## 2021-10-10 RX ADMIN — Medication 6 MG: at 00:43

## 2021-10-10 RX ADMIN — METHYLPHENIDATE HYDROCHLORIDE 5 MG: 5 TABLET ORAL at 09:42

## 2021-10-10 ASSESSMENT — MIFFLIN-ST. JEOR: SCORE: 1562.75

## 2021-10-10 ASSESSMENT — ACTIVITIES OF DAILY LIVING (ADL)
ADLS_ACUITY_SCORE: 15

## 2021-10-10 NOTE — PROGRESS NOTES
[facility]    Progress Note - Daniela 3 Service  Date of Admission:  6/28/2021    Changes today:  - Medically ready for discharge to LTACH, awaiting placement  - Labs MWF with dialysis  - Will check EKG this coming week to reevaluate QTc    Assessment & Plan             Dax Villareal is a 30 year old man with hx of alcohol use disorder admitted on 6/28/2021 after recent prolonged admission at [facility] in [place] who has severe acute alcoholic hepatitis c/b HE, ESRD requiring HD, and malnutrition in the setting of acute necrotizing pancreatitis, s/p PEG-J placement on 7/13. He arrived with acute respiratory failure and septic shock that have resolved, and his secondary bacterial peritonitis has been appropriately treated. He requires continued management for ESRD, hepatic encephalopathy, infected pancreatic pseudocyst, nutritional support via PEG-J, and persistent right pleural effusion. Clinically improving after multiple necrosectomies. Increasing leukocytosis and hypotension on 9/2 prompting resumption of IV antibiotics - has been stable since that time. Repeat CT with interval improvement in fluid collections.    Chronic pain  Patient with large opioid need 2/2 necrotizing pancreatitis.  Palliative care team on board, and is assisting with opioid taper.  Patient controlled on current regimen.  - Discontinued dilaudid PO TID  - Will keep dilaudid 1mg q3h PRN for breakthrough pain  - Taper plan (slow version dates below, see palliative care note for more details):   -10/8 discontinue 1mg TID scheduled hydromorphone   -10/18 decrease hydromorphone to 1mg q4h prn (from q3h)   -11/01 decrease hydromorphone to 1mg QID prn   -11/15 decrease hydromorphone to 1mg TID prn (continue this until no longer on opioids)  - Gabapentin 100mg post-dialysis (three times per week) (per palliative, if tolerates without sedation, could uptitrate to 300mg 3 times per week after  dialysis)    Hyponatremia  Patient with down-trending sodium the week of 8/27; likely due to substantial NG output and low solute intake and hepatic dysfunction. Stable 127-128. Then week of 9/27 hyponatremia (120), likely due to GI losses, nephrology following. Per nephrology, this is related to his chronic illness, ESRD, and liver disease and apart from maintaining a 1.5L fluid restriction there is nothing to do for this.  Had worsening of sodium on 10/7 (122), though likely 2/2 the above, and this was measured before his HD session, which may have also contributed.  - Repeat BMP 10/8    VRE, pseudomonas, and candida in wound culture  Obtained from around G-tube site during a rapid response on 9/28 for lactic acidosis (2.1). Unclear utility of a wound culture, however it is growing multiple drug resistant bacteria. Discussed with ID curbside physician, given complex case and history will place formal consult. Per ID, no treatment needed.  - No antibiotics  - ID signed off    Asymptomatic hypotension   Hypovolemia  Patient with substantial daily NG output and poor PO. Also note significant weight loss over the past few weeks. He has needed intermittent boluses of normal saline for euvolemia. Have discussed with renal multiple times - please re-evaluate volume status vs dry weight as he is often hypotensive following fluid removal.  - Weight 78kg on 8/13. Then 55 kg, weight 68.5kg on 9/22. Concern for weight loss vs. targeting dry weight.  - Monitor vitals and output post dialysis, consider fluids prn.  - cont midodrine 15 TID with Nephrology    Necrotizing alcoholic pancreatitis w/ infected pseudocyst, improving  Hepatic Fluid collection, stable to improving  Secondary Bacterial Peritonitis 2/2 infected pseudocyst with VRE, improving  Septic Shock, resolved  Admitted to the [facility] ICU on 6/28/2021 from Replaced by [facility] in [place] for septic shock due to necrotizing pancreatitis. CT on admit showing mature  collection with enhancing wall measuring ~[id number]. IR placed perc drain 6/29. Repeat imaging 7/18 revealed walled off necrotic collection amenable to endoscopic transluminal drainage with cystgastrostomy stenting with necrosectomy on 7/21/21. Complete necrosectomy on 8/11. Antibiotics (cefepime and metronidazole) were discontinued on 8/25 after left flank drain removed, re-initiated in the setting of fever on 9/2. Favor abdominal etiology, repeat CT on 9/7 with interval improvement in fluid collections - completed 7d course of abx per ID with EOT 9/8. Nausea & vomiting with PO intake is likely 2/2 complications from pancreatitis (gastric outlet obstruction vs duodenal stricture), patient is not a candidate for whipple at this time and is already on the alternative therapy which is g-tube decompression and GJ feeds. If overall health improves, could follow up with GI as an outpatient to consider whipple in future.  - Will likely need intermittent antibiotics with fluid collections occasionally with bacterial growth moving forward as to be expected in nec panc  - Palliative care following for pain management and goals of care given overall illness    G-tube Fistula  Dax had been having increasing G-tube output and was found to have an erythematous indurated area next to his G-tube insertion. This was found to be his G-tube balloon pushing through the skin. He underwent G-tube replacement on  9/13/21. His old G-tube site has since had large volume output requiring frequent wound care dressing changes.  - 9/20/21 - EGD to close fistula completed by GI  - Wound team consulted, follow their wound care recs    Periapical abscess: L 3rd maxillary molar  Dental Caries  - Dentistry consulted, and discussed with oral surgery  - Amoxicillin for 7d course (9/21-9/27)  - Dental extraction scheduled - 10/7 for evaluation on [facility]    Gastric Outlet Obstruction  High G tube output due to the cystogastric connection/fluid  drainage noted on 8/5. Per GI, possibly related to gastric outlet from severe abdominal inflammation and is expected. Continues to have mild to moderate NG output - stable to improved. Relieved with draining G tube to gravity when experiencing pain/nausea.    Right Pancreatic Hydrothorax, stable  Increased dyspnea with CXR on 7/19 with further imaging and thoracentesis confirming a hepatic/pancreatic hydrothorax. Thoracentesis PRN for dyspnea, most recently 9/3.    ESRD due to ATN  Hyperphosphatemia  Hypokalemia  Stage III RADHA due to ATN from septic shock without recovery for >3 months. Line is tunneled dialysis catheter from [facility]. Nephrology dialyzed on Tu, Th, Sa schedule.  - Sevelemer powder TID with TF for hyperphosphatemia  - Scheduling 20mEq potassium through feeding tube daily     Anemia of chronic illness  Hematochezia, resolved  Hemoperitoneum  Baseline Hgb 17. Running between 6-8 in setting of chronic illness, new ESRD with epocrit per Nephrology, frequent lab draws and procedures. Hematochezia on 8/8 after starting standard intensity heparin, now resolved. Paracentesis on 8/21 with hemoperitoneum. No active bleeding. Required a unit of PRBC on 9/4 in the setting of several fluid boluses.   - Goal hgb > 7.0, trend Hgb     Alcohol Use Disorder  Counseled patient on complete alcohol cessation. Not interested in additional services at this time. Continue daily folic acid and thiamine.  - Ensure patient has resources for treatment at discharge if managing alcohol cessation independently becomes challenging.    Severe malnutrition of chronic illness  Poor oral intake in the setting of necrotizing pancreatitis. PEG tube placed 7/13, replaced 9/14. Continuous tube feeds with goal of 65 ml/hr with continuous pancreatic enzymes. Transitioning to renal formula.  - Continuous tube feeds  - Advancing diet as tolerated    Suspected sleep apnea  Patient requests supplemental oxygen at night as his friends have told him  "he may have sleep apnea and he feels that the oxygen helps him \"breathe lighter\". No hypoxia.  - Outpatient sleep study       Diet: Advance diet as tolerated  DVT Prophylaxis: SCDs  Rdz Catheter: Not present  Central Lines: None    Code Status: Full Code      Disposition Plan   Expected discharge: 7-8 days  recommended to long term care once adequate pain management/ tolerating PO medications and safe disposition plan/ TCU bed available     The patient's care was discussed with the Attending Physician, Dr. Yu.    Marleny Monk, PGY-1  Internal Medicine-Pediatrics  [facility]    ______________________________________________________________________    Interval History   NAEON.  Patient reports his tube site is painful but taping it down has helped so it doesn't get tugged on and irritate it further. He also notes nausea after eating soup today and vomited. Denies fevers, chills, rash, worsened pain.    Data reviewed today: I reviewed all medications, new labs and imaging results over the last 24 hours. I personally reviewed no images or EKG's today.    Physical Exam   Vital Signs: Temp: (!) 95.9  F (35.5  C) Temp src: Oral BP: (!) 88/34 Pulse: 107   Resp: 18 SpO2: 93 % O2 Device: Nasal cannula Oxygen Delivery: 4 LPM  Weight: 146 lbs 9.69 oz     Constitutional: Awake, alert, NAD, pleasant, cooperative. Appears uncomfortable due to nausea. Interactive with examiner.  Ill-appearing, unchanged during hospitalization.  HEENT: Sclera anicteric, Normal oropharynx without ulcers or exudate, MMM  CV: tachycardic rate, regular rhythm, no murmurs, gallops or rubs. JVD normal   Respiratory: CTAB, no wheezing, crackles or rales. Non-labored  Abd: Soft, NT/ND, +bs, no HSM.  Dressings cdi around G-tube  Ext: Contractures in lower extremities  Skin: No lesions or rashes over exposed areas, normal color, texture and turgor  Neuro: AAO x 3  "

## 2021-10-10 NOTE — PROVIDER NOTIFICATION
5B: 236: KR  pt has a lab for lactic acid whole blood draw; labs drawn at dialysis; is this something that is needed now or can it wait until dialysis tomorrow. also not on K+ replacement; k+ 3.0; do you want replaced? thanks  [id number]    MD called back stating will recheck pt's K+ & check lactic acid whole blood at dialysis tomorrow; on daily supplements.    Erna Eubanks RN on 10/10/2021 at 9:52 PM

## 2021-10-10 NOTE — PLAN OF CARE
Shift:   VS: Temp: 97.7  F (36.5  C) Temp src: Oral BP: 108/50 Pulse: 85   Resp: 16 SpO2: 95 % O2 Device: Nasal cannula with humidification Oxygen Delivery: 4 LPM  Pain: Given dilaudid for abdominal pain  Neuro: A&Ox4, flat affect, calls appropriately  Cardiac:   Denies chest pain  Respiratory: 4L NC, sats in 95%-96%  GI/Diet/Appetite: Poor oral intake, TF at 45cc/hr via J-tube. G tube to gravity with moderate output.   :  No UO this shift, pt is on hemodialysis. No BM this shift  LDA's: No IV access  Skin: No new deficit noted  Activity: Heavy assist x2 with lift  Tests/Procedures:   Pertinent Labs/Lab Collection:      Plan: Continue with cares.

## 2021-10-10 NOTE — PLAN OF CARE
Assumed cares [id number]. Pt was checked hourly on shift    Pt A&O x4. VSS on 2.5L NC. Pt denies chest pain. C/O SOB on exertion; tolerable with 2.5L NC. C/O generalize ABD pain radiating to back; oral dilaudid & tylenol given x2. Atarax given x1. Jtube infusing TF at 45mL/hr. Gtube OTG; 300mL output. No BM on shift; minimal urine output on shift; on dialysis; CVC in-place. Reg diet; ate 75% of soup; tolerate in take. Pt appears to be resting between cares.      Plan of Care: Continuing monitoring patient and notify MD of any changes.     Erna Eubanks RN on 10/09/2021 at 2300

## 2021-10-11 LAB
ANION GAP SERPL CALCULATED.3IONS-SCNC: 12 MMOL/L (ref 3–14)
BUN SERPL-MCNC: 79 MG/DL (ref 7–30)
CALCIUM SERPL-MCNC: 10.4 MG/DL (ref 8.5–10.1)
CHLORIDE BLD-SCNC: 87 MMOL/L (ref 94–109)
CO2 SERPL-SCNC: 25 MMOL/L (ref 20–32)
CREAT SERPL-MCNC: 5.38 MG/DL (ref 0.66–1.25)
ERYTHROCYTE [DISTWIDTH] IN BLOOD BY AUTOMATED COUNT: 15.7 % (ref 10–15)
GFR SERPL CREATININE-BSD FRML MDRD: 13 ML/MIN/1.73M2
GLUCOSE BLD-MCNC: 132 MG/DL (ref 70–99)
HCT VFR BLD AUTO: 26.9 % (ref 40–53)
HGB BLD-MCNC: 8.8 G/DL (ref 13.3–17.7)
LACTATE SERPL-SCNC: 1.6 MMOL/L (ref 0.7–2)
MCH RBC QN AUTO: 30.4 PG (ref 26.5–33)
MCHC RBC AUTO-ENTMCNC: 32.7 G/DL (ref 31.5–36.5)
MCV RBC AUTO: 93 FL (ref 78–100)
PLATELET # BLD AUTO: 249 10E3/UL (ref 150–450)
POTASSIUM BLD-SCNC: 3.9 MMOL/L (ref 3.4–5.3)
RBC # BLD AUTO: 2.89 10E6/UL (ref 4.4–5.9)
SODIUM SERPL-SCNC: 124 MMOL/L (ref 133–144)
WBC # BLD AUTO: 7.9 10E3/UL (ref 4–11)

## 2021-10-11 PROCEDURE — 250N000011 HC RX IP 250 OP 636: Performed by: CLINICAL NURSE SPECIALIST

## 2021-10-11 PROCEDURE — L3260 AMBULATORY SURGICAL BOOT EAC: HCPCS

## 2021-10-11 PROCEDURE — 93005 ELECTROCARDIOGRAM TRACING: CPT

## 2021-10-11 PROCEDURE — 250N000013 HC RX MED GY IP 250 OP 250 PS 637: Performed by: INTERNAL MEDICINE

## 2021-10-11 PROCEDURE — 99232 SBSQ HOSP IP/OBS MODERATE 35: CPT | Mod: GC | Performed by: INTERNAL MEDICINE

## 2021-10-11 PROCEDURE — 258N000003 HC RX IP 258 OP 636: Performed by: CLINICAL NURSE SPECIALIST

## 2021-10-11 PROCEDURE — 99232 SBSQ HOSP IP/OBS MODERATE 35: CPT | Performed by: CLINICAL NURSE SPECIALIST

## 2021-10-11 PROCEDURE — 250N000013 HC RX MED GY IP 250 OP 250 PS 637

## 2021-10-11 PROCEDURE — 90937 HEMODIALYSIS REPEATED EVAL: CPT

## 2021-10-11 PROCEDURE — 250N000011 HC RX IP 250 OP 636: Performed by: STUDENT IN AN ORGANIZED HEALTH CARE EDUCATION/TRAINING PROGRAM

## 2021-10-11 PROCEDURE — 82310 ASSAY OF CALCIUM: CPT

## 2021-10-11 PROCEDURE — 83605 ASSAY OF LACTIC ACID: CPT | Performed by: STUDENT IN AN ORGANIZED HEALTH CARE EDUCATION/TRAINING PROGRAM

## 2021-10-11 PROCEDURE — 120N000002 HC R&B MED SURG/OB UMMC

## 2021-10-11 PROCEDURE — 634N000001 HC RX 634: Performed by: CLINICAL NURSE SPECIALIST

## 2021-10-11 PROCEDURE — 250N000013 HC RX MED GY IP 250 OP 250 PS 637: Performed by: STUDENT IN AN ORGANIZED HEALTH CARE EDUCATION/TRAINING PROGRAM

## 2021-10-11 PROCEDURE — 85027 COMPLETE CBC AUTOMATED: CPT | Performed by: CLINICAL NURSE SPECIALIST

## 2021-10-11 PROCEDURE — 93010 ELECTROCARDIOGRAM REPORT: CPT | Performed by: INTERNAL MEDICINE

## 2021-10-11 PROCEDURE — 250N000013 HC RX MED GY IP 250 OP 250 PS 637: Performed by: HOSPITALIST

## 2021-10-11 RX ORDER — HEPARIN SODIUM 1000 [USP'U]/ML
500 INJECTION, SOLUTION INTRAVENOUS; SUBCUTANEOUS
Status: COMPLETED | OUTPATIENT
Start: 2021-10-11 | End: 2021-10-11

## 2021-10-11 RX ORDER — HEPARIN SODIUM 1000 [USP'U]/ML
500 INJECTION, SOLUTION INTRAVENOUS; SUBCUTANEOUS CONTINUOUS
Status: DISCONTINUED | OUTPATIENT
Start: 2021-10-11 | End: 2021-10-11

## 2021-10-11 RX ADMIN — HYDROMORPHONE HYDROCHLORIDE 1 MG: 1 SOLUTION ORAL at 13:54

## 2021-10-11 RX ADMIN — PANCRELIPASE 2 CAPSULE: 24000; 76000; 120000 CAPSULE, DELAYED RELEASE PELLETS ORAL at 00:01

## 2021-10-11 RX ADMIN — Medication 40 MG: at 07:37

## 2021-10-11 RX ADMIN — HYDROMORPHONE HYDROCHLORIDE 1 MG: 1 SOLUTION ORAL at 10:17

## 2021-10-11 RX ADMIN — MIDODRINE HYDROCHLORIDE 15 MG: 5 TABLET ORAL at 13:35

## 2021-10-11 RX ADMIN — POTASSIUM CHLORIDE 20 MEQ: 1.5 POWDER, FOR SOLUTION ORAL at 07:36

## 2021-10-11 RX ADMIN — PANCRELIPASE 2 CAPSULE: 24000; 76000; 120000 CAPSULE, DELAYED RELEASE PELLETS ORAL at 04:24

## 2021-10-11 RX ADMIN — HYDROMORPHONE HYDROCHLORIDE 1 MG: 1 SOLUTION ORAL at 16:51

## 2021-10-11 RX ADMIN — SODIUM BICARBONATE 325 MG: 325 TABLET ORAL at 12:20

## 2021-10-11 RX ADMIN — ACETAMINOPHEN 325 MG: 325 SOLUTION ORAL at 12:20

## 2021-10-11 RX ADMIN — HYDROMORPHONE HYDROCHLORIDE 1 MG: 1 SOLUTION ORAL at 06:57

## 2021-10-11 RX ADMIN — Medication 1 PACKET: at 07:36

## 2021-10-11 RX ADMIN — PANCRELIPASE 2 CAPSULE: 24000; 76000; 120000 CAPSULE, DELAYED RELEASE PELLETS ORAL at 12:20

## 2021-10-11 RX ADMIN — SEVELAMER CARBONATE 1.6 G: 800 POWDER, FOR SUSPENSION ORAL at 20:13

## 2021-10-11 RX ADMIN — PROCHLORPERAZINE MALEATE 5 MG: 5 TABLET ORAL at 04:11

## 2021-10-11 RX ADMIN — SODIUM BICARBONATE 325 MG: 325 TABLET ORAL at 04:24

## 2021-10-11 RX ADMIN — SODIUM BICARBONATE 325 MG: 325 TABLET ORAL at 00:00

## 2021-10-11 RX ADMIN — FOLIC ACID 1 MG: 1 TABLET ORAL at 07:36

## 2021-10-11 RX ADMIN — METHYLPHENIDATE HYDROCHLORIDE 5 MG: 5 TABLET ORAL at 07:36

## 2021-10-11 RX ADMIN — PANCRELIPASE 2 CAPSULE: 24000; 76000; 120000 CAPSULE, DELAYED RELEASE PELLETS ORAL at 20:12

## 2021-10-11 RX ADMIN — SEVELAMER CARBONATE 1.6 G: 800 POWDER, FOR SUSPENSION ORAL at 16:31

## 2021-10-11 RX ADMIN — Medication 1 PACKET: at 16:31

## 2021-10-11 RX ADMIN — MIDODRINE HYDROCHLORIDE 15 MG: 5 TABLET ORAL at 07:36

## 2021-10-11 RX ADMIN — ONDANSETRON HYDROCHLORIDE 4 MG: 4 TABLET, FILM COATED ORAL at 07:44

## 2021-10-11 RX ADMIN — HYDROXYZINE HYDROCHLORIDE 50 MG: 25 TABLET, FILM COATED ORAL at 12:20

## 2021-10-11 RX ADMIN — CYCLOBENZAPRINE 10 MG: 10 TABLET, FILM COATED ORAL at 19:05

## 2021-10-11 RX ADMIN — ACETAMINOPHEN 325 MG: 325 SOLUTION ORAL at 16:30

## 2021-10-11 RX ADMIN — SODIUM CHLORIDE 250 ML: 9 INJECTION, SOLUTION INTRAVENOUS at 12:56

## 2021-10-11 RX ADMIN — PANCRELIPASE 2 CAPSULE: 24000; 76000; 120000 CAPSULE, DELAYED RELEASE PELLETS ORAL at 08:32

## 2021-10-11 RX ADMIN — Medication 5 ML: at 07:37

## 2021-10-11 RX ADMIN — PROCHLORPERAZINE MALEATE 5 MG: 5 TABLET ORAL at 19:05

## 2021-10-11 RX ADMIN — HYDROXYZINE HYDROCHLORIDE 50 MG: 25 TABLET, FILM COATED ORAL at 02:26

## 2021-10-11 RX ADMIN — HEPARIN SODIUM 500 UNITS: 1000 INJECTION, SOLUTION INTRAVENOUS; SUBCUTANEOUS at 12:57

## 2021-10-11 RX ADMIN — METHYLPHENIDATE HYDROCHLORIDE 5 MG: 5 TABLET ORAL at 16:31

## 2021-10-11 RX ADMIN — THIAMINE HCL TAB 100 MG 100 MG: 100 TAB at 07:36

## 2021-10-11 RX ADMIN — Medication 1 PACKET: at 20:14

## 2021-10-11 RX ADMIN — GABAPENTIN 100 MG: 100 CAPSULE ORAL at 20:13

## 2021-10-11 RX ADMIN — MIDODRINE HYDROCHLORIDE 15 MG: 5 TABLET ORAL at 19:05

## 2021-10-11 RX ADMIN — SODIUM BICARBONATE 325 MG: 325 TABLET ORAL at 08:32

## 2021-10-11 RX ADMIN — HEPARIN SODIUM 500 UNITS/HR: 1000 INJECTION, SOLUTION INTRAVENOUS; SUBCUTANEOUS at 12:57

## 2021-10-11 RX ADMIN — SODIUM BICARBONATE 325 MG: 325 TABLET ORAL at 16:30

## 2021-10-11 RX ADMIN — SODIUM BICARBONATE 325 MG: 325 TABLET ORAL at 20:13

## 2021-10-11 RX ADMIN — HYDROMORPHONE HYDROCHLORIDE 1 MG: 1 SOLUTION ORAL at 20:13

## 2021-10-11 RX ADMIN — Medication 6 MG: at 00:00

## 2021-10-11 RX ADMIN — PROCHLORPERAZINE MALEATE 5 MG: 5 TABLET ORAL at 12:20

## 2021-10-11 RX ADMIN — SODIUM CHLORIDE 300 ML: 9 INJECTION, SOLUTION INTRAVENOUS at 12:55

## 2021-10-11 RX ADMIN — PANCRELIPASE 2 CAPSULE: 24000; 76000; 120000 CAPSULE, DELAYED RELEASE PELLETS ORAL at 16:30

## 2021-10-11 RX ADMIN — EPOETIN ALFA-EPBX 6000 UNITS: 10000 INJECTION, SOLUTION INTRAVENOUS; SUBCUTANEOUS at 12:55

## 2021-10-11 RX ADMIN — SEVELAMER CARBONATE 1.6 G: 800 POWDER, FOR SUSPENSION ORAL at 08:36

## 2021-10-11 RX ADMIN — HYDROMORPHONE HYDROCHLORIDE 1 MG: 1 SOLUTION ORAL at 03:54

## 2021-10-11 RX ADMIN — HYDROMORPHONE HYDROCHLORIDE 1 MG: 1 SOLUTION ORAL at 00:00

## 2021-10-11 ASSESSMENT — ACTIVITIES OF DAILY LIVING (ADL)
ADLS_ACUITY_SCORE: 15
ADLS_ACUITY_SCORE: 15
ADLS_ACUITY_SCORE: 17
ADLS_ACUITY_SCORE: 15
ADLS_ACUITY_SCORE: 17

## 2021-10-11 ASSESSMENT — MIFFLIN-ST. JEOR: SCORE: 1562.75

## 2021-10-11 NOTE — PROGRESS NOTES
[facility]    Progress Note - Daniela 3 Service  Date of Admission:  6/28/2021    Changes today:  - Medically ready for discharge to LTACH, awaiting placement  - Labs MWF with dialysis  - Dialysis today  - EKG today with QTc 474    Assessment & Plan             Dax Villareal is a 30 year old man with hx of alcohol use disorder admitted on 6/28/2021 after recent prolonged admission at [facility] in [place] who has severe acute alcoholic hepatitis c/b HE, ESRD requiring HD, and malnutrition in the setting of acute necrotizing pancreatitis, s/p PEG-J placement on 7/13. He arrived with acute respiratory failure and septic shock that have resolved, and his secondary bacterial peritonitis has been appropriately treated. He requires continued management for ESRD, hepatic encephalopathy, infected pancreatic pseudocyst, nutritional support via PEG-J, and persistent right pleural effusion. Clinically improving after multiple necrosectomies. Increasing leukocytosis and hypotension on 9/2 prompting resumption of IV antibiotics - has been stable since that time. Repeat CT with interval improvement in fluid collections.    Chronic pain  Patient with large opioid need 2/2 necrotizing pancreatitis.  Palliative care team on board, and is assisting with opioid taper.  Patient controlled on current regimen.  - Discontinued dilaudid PO TID  - Will keep dilaudid 1mg q3h PRN for breakthrough pain  - Taper plan (slow version dates below, see palliative care note for more details):   -10/8 discontinue 1mg TID scheduled hydromorphone   -10/18 decrease hydromorphone to 1mg q4h prn (from q3h)   -11/01 decrease hydromorphone to 1mg QID prn   -11/15 decrease hydromorphone to 1mg TID prn (continue this until no longer on opioids)  - Gabapentin 100mg post-dialysis (three times per week) (per palliative, if tolerates without sedation, could uptitrate to 300mg 3 times per week after  dialysis)    Hyponatremia  Patient with down-trending sodium the week of 8/27; likely due to substantial NG output and low solute intake and hepatic dysfunction. Stable 127-128. Then week of 9/27 hyponatremia (120), likely due to GI losses, nephrology following. Per nephrology, this is related to his chronic illness, ESRD, and liver disease and apart from maintaining a 1.5L fluid restriction there is nothing to do for this.  Had worsening of sodium on 10/7 (122), though likely 2/2 the above, and this was measured before his HD session, which may have also contributed.  - Repeat BMP 10/8    VRE, pseudomonas, and candida in wound culture  Obtained from around G-tube site during a rapid response on 9/28 for lactic acidosis (2.1). Unclear utility of a wound culture, however it is growing multiple drug resistant bacteria. Discussed with ID curbside physician, given complex case and history will place formal consult. Per ID, no treatment needed.  - No antibiotics  - ID signed off    Asymptomatic hypotension   Hypovolemia  Patient with substantial daily NG output and poor PO. Also note significant weight loss over the past few weeks. He has needed intermittent boluses of normal saline for euvolemia. Have discussed with renal multiple times - please re-evaluate volume status vs dry weight as he is often hypotensive following fluid removal.  - Weight 78kg on 8/13. Then 55 kg, weight 68.5kg on 9/22. Concern for weight loss vs. targeting dry weight.  - Monitor vitals and output post dialysis, consider fluids prn.  - cont midodrine 15 TID with Nephrology    Necrotizing alcoholic pancreatitis w/ infected pseudocyst, improving  Hepatic Fluid collection, stable to improving  Secondary Bacterial Peritonitis 2/2 infected pseudocyst with VRE, improving  Septic Shock, resolved  Admitted to the [facility] ICU on 6/28/2021 from [facility] in [place] for septic shock due to necrotizing pancreatitis. CT on admit showing mature  collection with enhancing wall measuring ~[id number]. IR placed perc drain 6/29. Repeat imaging 7/18 revealed walled off necrotic collection amenable to endoscopic transluminal drainage with cystgastrostomy stenting with necrosectomy on 7/21/21. Complete necrosectomy on 8/11. Antibiotics (cefepime and metronidazole) were discontinued on 8/25 after left flank drain removed, re-initiated in the setting of fever on 9/2. Favor abdominal etiology, repeat CT on 9/7 with interval improvement in fluid collections - completed 7d course of abx per ID with EOT 9/8. Nausea & vomiting with PO intake is likely 2/2 complications from pancreatitis (gastric outlet obstruction vs duodenal stricture), patient is not a candidate for whipple at this time and is already on the alternative therapy which is g-tube decompression and GJ feeds. If overall health improves, could follow up with GI as an outpatient to consider whipple in future.  - Will likely need intermittent antibiotics with fluid collections occasionally with bacterial growth moving forward as to be expected in nec panc  - Palliative care following for pain management and goals of care given overall illness    G-tube Fistula  Dax had been having increasing G-tube output and was found to have an erythematous indurated area next to his G-tube insertion. This was found to be his G-tube balloon pushing through the skin. He underwent G-tube replacement on  9/13/21. His old G-tube site has since had large volume output requiring frequent wound care dressing changes.  - 9/20/21 - EGD to close fistula completed by GI  - Wound team consulted, follow their wound care recs    Periapical abscess: L 3rd maxillary molar  Dental Caries  - Dentistry consulted, and discussed with oral surgery  - Amoxicillin for 7d course (9/21-9/27)  - Dental extraction scheduled - 10/7 for evaluation on [facility]    Gastric Outlet Obstruction  High G tube output due to the cystogastric connection/fluid  drainage noted on 8/5. Per GI, possibly related to gastric outlet from severe abdominal inflammation and is expected. Continues to have mild to moderate NG output - stable to improved. Relieved with draining G tube to gravity when experiencing pain/nausea.    Right Pancreatic Hydrothorax, stable  Increased dyspnea with CXR on 7/19 with further imaging and thoracentesis confirming a hepatic/pancreatic hydrothorax. Thoracentesis PRN for dyspnea, most recently 9/3.    ESRD due to ATN  Hyperphosphatemia  Hypokalemia  Stage III RADHA due to ATN from septic shock without recovery for >3 months. Line is tunneled dialysis catheter from [facility]. Nephrology dialyzed on Tu, Th, Sa schedule.  - Sevelemer powder TID with TF for hyperphosphatemia  - Scheduling 20mEq potassium through feeding tube daily     Anemia of chronic illness  Hematochezia, resolved  Hemoperitoneum  Baseline Hgb 17. Running between 6-8 in setting of chronic illness, new ESRD with epocrit per Nephrology, frequent lab draws and procedures. Hematochezia on 8/8 after starting standard intensity heparin, now resolved. Paracentesis on 8/21 with hemoperitoneum. No active bleeding. Required a unit of PRBC on 9/4 in the setting of several fluid boluses.   - Goal hgb > 7.0, trend Hgb     Alcohol Use Disorder  Counseled patient on complete alcohol cessation. Not interested in additional services at this time. Continue daily folic acid and thiamine.  - Ensure patient has resources for treatment at discharge if managing alcohol cessation independently becomes challenging.    Severe malnutrition of chronic illness  Poor oral intake in the setting of necrotizing pancreatitis. PEG tube placed 7/13, replaced 9/14. Continuous tube feeds with goal of 65 ml/hr with continuous pancreatic enzymes. Transitioning to renal formula.  - Continuous tube feeds  - Advancing diet as tolerated    Suspected sleep apnea  Patient requests supplemental oxygen at night as his friends have told him  "he may have sleep apnea and he feels that the oxygen helps him \"breathe lighter\". No hypoxia.  - Outpatient sleep study       Diet: Advance diet as tolerated  DVT Prophylaxis: SCDs  Rdz Catheter: Not present  Central Lines: None    Code Status: Full Code      Disposition Plan   Expected discharge: 7-8 days  recommended to long term care once adequate pain management/ tolerating PO medications and safe disposition plan/ TCU bed available     The patient's care was discussed with the Attending Physician, Dr. Yu.    Marleny Monk, PGY-1  Internal Medicine-Pediatrics  [facility]    ______________________________________________________________________    Interval History   NAEON.  Patient reports feeling tired this morning. No new pain. Denies fevers, chills, rash, worsened pain.    Data reviewed today: I reviewed all medications, new labs and imaging results over the last 24 hours. I personally reviewed no images or EKG's today.    Physical Exam   Vital Signs: Temp: 96.8  F (36  C) Temp src: Oral BP: 92/46 Pulse: 88   Resp: 14 SpO2: 94 % O2 Device: Nasal cannula Oxygen Delivery: 4 LPM  Weight: 146 lbs 9.69 oz     Constitutional: Asleep, easily arousable, NAD, pleasant, cooperative. Interactive with examiner.  Ill-appearing, unchanged during hospitalization.  HEENT: Sclera anicteric, Normal oropharynx without ulcers or exudate, MMM  CV: tachycardic rate, regular rhythm, no murmurs, gallops or rubs. JVD normal  Respiratory: CTAB, no wheezing, crackles or rales. Non-labored  Abd: Soft, NT/ND, +bs, no HSM.  Dressings cdi around G-tube  Ext: Contractures in lower extremities  Skin: Dry. No lesions or rashes over exposed areas, normal color, texture and turgor  Neuro: AAO x 3  "

## 2021-10-11 NOTE — PROGRESS NOTES
S: Pt seen at [facility] for the first visit to see his feet. The second time I visited he was in Dialysis and I just dropped off the Bliateral size small Ortho wedge shoes that the PT wanted for standing. O: I see the EPIC order for the forefoot unloading shoes due to plantarflexion contractures. A: I talked to the PT and she wanted the patient to rock forward on the Orthowedge shoes due to his tight plantarflexed sensitive feet. I thought this was an interesting alternate use for the shoes that are designed to off load the forefoot. She just wants the patient to stand and the shoes would not be for walking. P: FU PRN. G: The goal is to provide footwear to accommodate his feet deformity.  Electronically signed George Mena CPO, LPO.

## 2021-10-11 NOTE — PROGRESS NOTES
HEMODIALYSIS TREATMENT NOTE    Date: 10/11/2021  Time: 3:46 PM    Data:  Pre Wt: 66.5  Desired Wt: 64.5 kg   Post Wt: 66.5  Weight change: 0kg  Ultrafiltration - Post Run Net Total Removed (mL): 0 mL  Vascular Access Status: CVC  patent  Dialyzer Rinse: Clear  Total Blood Volume Processed: 58.8    Total Dialysis (Treatment) Time: 3   Dialysate Bath: K 4, Ca 3  Heparin 500 units loading + 500 units/hr    Lab:   Yes    Interventions:  epo given  Midodrine given  Heparin used during run  PCN came to give liquid pain medication.  Fluid goal titrated to support BP    Assessment:  BP soft at start of tx. Unable to pull fluid even with midodrine on board. Pt asymptomatic during low BP. Pt able to make his needs known. Handoff given to PCN     Plan:    Per renal

## 2021-10-11 NOTE — PLAN OF CARE
D/I: Pt is alert and oriented x 4. Tachycardic and dyspneic on 4L oxygen via NC over night. Right Chest dialysis access intact. No PIV access. GJ tube patent with J-port running TF @ 45mL/hr  with Q4h enzymes/NaBicarb mixture. G-port to gravity. Old GJ dressing CDI. Pt refused to have dressing changed. Oral Dilaudid given x 3 this shift. Skin is jaundiced and fragile. Pt refuses repositioning and states that he can reposition independently in bed.   P: Will continue to monitor pt and follow POC. Hemodialysis today. Labs to be drawn in dialysis.

## 2021-10-11 NOTE — PLAN OF CARE
Assumed cares [id number]. Pt was checked hourly on shift    Pt A&O x4. soft BP; baseline; VSS on 2.5L NC. Pt denies chest pain. C/O SOB on exertion; tolerable with 2.5L NC. C/O generalize ABD pain radiating to back; oral dilaudid given x1. Atarax given x1. Jtube infusing TF at 45mL/hr. Gtube OTG; 55mL output. No BM; minimal urine output; on dialysis; CVC in-place. Reg diet; no appetite. Pt refused dressing changes on ABD; refused repositioning & skin check. Pt appears to be resting between cares.     Plan of Care: Continuing monitoring patient and notify MD of any changes.    Erna Eubanks RN on 10/10/2021 at 10:45 PM

## 2021-10-11 NOTE — PROGRESS NOTES
Care Management Follow Up    Length of Stay (days): 105    Expected Discharge Date: TBD   Concerns to be Addressed: LTACH wait list Number   Patient plan of care discussed at interdisciplinary rounds: Yes      Additional Information:  Patient is aprox 7 of 12 on [facility] waitlist.    Med team updated.    RNCC to continue to follow and assist as needed.      Marisol Moya, RN    Marisol Moya RN, BSN  5B RN Care Coordinator  [phone] phone  [phone] pager    Weekend or Holiday Care Coordinator [phone] pager  Job Code [id number]

## 2021-10-11 NOTE — PROGRESS NOTES
Nephrology Progress Note  10/11/2021         Dax Villareal is a 30 year old male with h/o ETOH hepatitis, end stage liver disease, RADHA on HD, transferred from [facility] in [place] for septic shock on 6/28/21 for treatment of necrotizing pancreatitis and decompensated liver disease. He was initially admitted to [facility] 5/19/21 and started on RRT 5/26/2021.        Interval History :   Mr Villareal had necrosectomy for 4th time on 8/11 with no more anticipated, medically ready for TCU but awaiting placement.  Running HD today, ordered for 0-1L of UF, wt's have been stable recently and GI output is close to matching intake generally.  No changed in HD plan, Na runs chronically low despite fluid restriction.        Assessment & Recommendations:   ESRD-Cr was 0.8 as recently as 5/19/2021 but now HD dependent for 3 months, started RRT at [facility] prior to transfer to [facility] on 5/26.  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  continuing restorative cares for now, BP's have been stable enough to support HD.  Had GI stenting of pancreas x4.                 -Line is TDC from PTA               -Planning HD on MWF schedule, refuses extra runs, will continue to offer.       Volume status-Anuric, wt not reliable, pulling 0-1L of UF with run today as his GI drain output was more than intake yesterday and SBP's are in 90's.      Electrolytes/pH-Drawing labs on runs, last Na 124, runs chronically low, should correct some on run today.  K 3.9, and bicarb 25 no acute issues.      Ca/phos/pth-Ca 10.4, Mg 1.7, Phos 6.3 last check, drawing labs on runs. On Sevelamer 1.6g TID, is on Ca carbonate as well and should come down with HD, following closely.                     Anemia-Hgb 8.8, acute management per team, last PRBC's 7/14.  On EPO 5k with runs, iron sats 43%      Nutrition-Taking PO, appetite poor lately, on Vital TF but having frequent  "nausea.         Recommendations were communicated to primary team via note       SOFIA Marlow CNS  Clinical Nurse Specialist  [phone]      Review of Systems:   I reviewed the following systems:  Gen: No fevers or chills  CV: No CP at rest  Resp: No SOB at rest  GI: No N/V      Physical Exam:   I/O last 3 completed shifts:  In: 1275 [P.O.:60; NG/GT:180]  Out: 55 [Emesis/NG output:55]   BP (!) 77/42   Pulse 93   Temp 96.8  F (36  C) (Oral)   Resp 14   Ht 1.676 m (5' 6\")   Wt 66.5 kg (146 lb 9.7 oz)   SpO2 91%   BMI 23.66 kg/m       GENERAL APPEARANCE: Lying in bed, in no distress.    EYES:  scleral icterus, pupils equal  Pulmonary: decreased BS   CV: tachy   - Edema - no LE edema  GI: mild distention, non-tender. + tenderness at G-tube site and generalized abdominal pain.    MS: no evidence of inflammation in joints, no muscle tenderness  SKIN: no rash, warm, dry, no cyanosis, + G tube  NEURO: alert/interactive  ACCESS: Right TDC with no induration or erythema    Labs:   All labs reviewed by me  Electrolytes/Renal - Recent Labs   Lab Test 10/11/21  1258 10/08/21  1500 10/07/21  2234 10/06/21  1330 10/06/21  1330 10/02/21  1358 10/01/21  1052 09/30/21 2013 09/30/21 2013 09/30/21  1054 09/30/21  1054   * 124* 125*   < > 122*   < > 125*  125*   < > 127*   < > 128*   POTASSIUM 3.9 3.0* 3.4   < > 3.3*   < > 3.7  3.7   < > 4.1   < > 2.9*   CHLORIDE 87* 86* 87*   < > 84*   < > 91*  91*   < > 93*   < > 92*   CO2 25 24 25   < > 21   < > 22  22   < > 25   < > 25   BUN 79* 62* 46*   < > 64*   < > 51*  51*   < > 42*   < > 34*   CR 5.38* 4.81* 3.95*   < > 4.77*   < > 4.27*  4.27*   < > 3.58*   < > 3.13*   * 114* 113*   < > 108*   < > 127*  127*   < > 94   < > 114*   JANENE 10.4* 10.2* 10.2*   < > 10.5*   < > 10.3*  10.3*   < > 9.6   < > 9.5   MAG  --   --   --   --   --   --  1.7  --  1.9  --  1.9   PHOS  --   --   --   --  6.3*  --  5.1*  --   --   --  4.3    < > = values in this interval " not displayed.       CBC -   Recent Labs   Lab Test 10/11/21  1258 10/06/21  1531 10/04/21  1330   WBC 7.9 7.1 9.0   HGB 8.8* 8.7* 9.3*    239 267       LFTs -   Recent Labs   Lab Test 10/01/21  1052 09/28/21  1005 09/04/21  0414 09/03/21  0523 09/03/21  0523   ALKPHOS 96  --  81  --  95   BILITOTAL 0.9  --  1.4*  --  2.0*   ALT 17  --  29  --  31   AST 23  --  38  --  39   PROTTOTAL 8.7  --  8.8  --  9.2*   ALBUMIN 2.7* 2.5* 2.9*   < > 2.3*    < > = values in this interval not displayed.       Iron Panel -   Recent Labs   Lab Test 08/14/21  2055 07/19/21  0632   IRON 28* 31*   IRONSAT 21 43   JERRELL 2,186*  --            Current Medications:    sodium bicarbonate  325 mg Per Feeding Tube Q4H    And     amylase-lipase-protease  1-2 capsule Per Feeding Tube Q4H     amylase-lipase-protease  2 capsule Per G Tube TID w/meals     B and C vitamin Complex with folic acid  5 mL Per Feeding Tube Daily     fiber modular (NUTRISOURCE FIBER)  1 packet Per Feeding Tube TID     folic acid  1 mg Oral or Feeding Tube Daily     gabapentin  100 mg Oral Once per day on Mon Wed Fri     heparin lock flush  5-20 mL Intracatheter Q24H     influenza quadrivalent (PF) vacc  0.5 mL Intramuscular Prior to discharge     melatonin  6 mg Oral or Feeding Tube At Bedtime     methylphenidate  5 mg Per Feeding Tube BID     midodrine  15 mg Per Feeding Tube TID     pantoprazole  40 mg Oral or Feeding Tube QAM AC     potassium chloride  20 mEq Per Feeding Tube Daily     protein modular  1 packet Per Feeding Tube Daily     sevelamer carbonate (RENVELA)  1.6 g Oral or Feeding Tube TID     sodium chloride (PF)  3 mL Intracatheter Q8H     sodium chloride (PF)  9 mL Intracatheter During Dialysis/CRRT (from stock)     sodium chloride (PF)  9 mL Intracatheter During Dialysis/CRRT (from stock)     vitamin B1  100 mg Oral or Feeding Tube Daily       - MEDICATION INSTRUCTIONS -       dextrose       heparin (porcine) 500 Units/hr (10/11/21 1257)     heparin  (porcine) 1,000 Units/hr (08/24/21 0907)

## 2021-10-11 NOTE — PROVIDER NOTIFICATION
Low 5B K.R 36 Follow up with BP: systolic improved, diastolic still low. 92/26 MAP 48 from 40. Mimi RIVAS [id number]    Response: No response.     1900: BP gradually increasing with each assessment. Pt remains asymptomatic. Last /47 with MAP 65. 2000 midodrine given at this times. Hand off to evening nurse.

## 2021-10-11 NOTE — PROVIDER NOTIFICATION
Low 5B R.K 36 New rate of TF to run at 45 mL/hr. Previous was 60 mL/hr. Wanted to confirm that order for 2 capsules creon + 325 mg Na Bicarb was still good with decreased rate. Mimi RIVAS [id number]    Response: Order is good to remain the same per provider at new rate.

## 2021-10-11 NOTE — PROVIDER NOTIFICATION
Low 5B K.R 36-1 BP low. Returned from dialysis about 35 min ago. BP 79/28 (MAP 43) & 89/27 (40). was given 15 mg midodrine during dialysis at 1335. Midodrine? Albumin? Will recheck in 30. Pt denies symptoms Mimi Lima [id number]    Response: Page back with BP in 30 minutes.

## 2021-10-12 ENCOUNTER — APPOINTMENT (OUTPATIENT)
Dept: OCCUPATIONAL THERAPY | Facility: CLINIC | Age: 31
End: 2021-10-12
Attending: INTERNAL MEDICINE
Payer: MEDICAID

## 2021-10-12 VITALS
HEART RATE: 83 BPM | SYSTOLIC BLOOD PRESSURE: 96 MMHG | BODY MASS INDEX: 23.56 KG/M2 | WEIGHT: 146.61 LBS | OXYGEN SATURATION: 97 % | HEIGHT: 66 IN | DIASTOLIC BLOOD PRESSURE: 39 MMHG | RESPIRATION RATE: 18 BRPM | TEMPERATURE: 97.4 F

## 2021-10-12 LAB
ATRIAL RATE - MUSE: 87 BPM
DIASTOLIC BLOOD PRESSURE - MUSE: NORMAL MMHG
INTERPRETATION ECG - MUSE: NORMAL
P AXIS - MUSE: 10 DEGREES
PR INTERVAL - MUSE: 160 MS
QRS DURATION - MUSE: 94 MS
QT - MUSE: 394 MS
QTC - MUSE: 474 MS
R AXIS - MUSE: -26 DEGREES
SYSTOLIC BLOOD PRESSURE - MUSE: NORMAL MMHG
T AXIS - MUSE: 39 DEGREES
VENTRICULAR RATE- MUSE: 87 BPM

## 2021-10-12 PROCEDURE — 250N000013 HC RX MED GY IP 250 OP 250 PS 637

## 2021-10-12 PROCEDURE — 250N000013 HC RX MED GY IP 250 OP 250 PS 637: Performed by: INTERNAL MEDICINE

## 2021-10-12 PROCEDURE — 90686 IIV4 VACC NO PRSV 0.5 ML IM: CPT | Performed by: STUDENT IN AN ORGANIZED HEALTH CARE EDUCATION/TRAINING PROGRAM

## 2021-10-12 PROCEDURE — 250N000011 HC RX IP 250 OP 636: Performed by: STUDENT IN AN ORGANIZED HEALTH CARE EDUCATION/TRAINING PROGRAM

## 2021-10-12 PROCEDURE — G0463 HOSPITAL OUTPT CLINIC VISIT: HCPCS

## 2021-10-12 PROCEDURE — 99239 HOSP IP/OBS DSCHRG MGMT >30: CPT | Mod: GC | Performed by: INTERNAL MEDICINE

## 2021-10-12 PROCEDURE — G0008 ADMIN INFLUENZA VIRUS VAC: HCPCS | Performed by: STUDENT IN AN ORGANIZED HEALTH CARE EDUCATION/TRAINING PROGRAM

## 2021-10-12 PROCEDURE — 250N000013 HC RX MED GY IP 250 OP 250 PS 637: Performed by: HOSPITALIST

## 2021-10-12 PROCEDURE — 97530 THERAPEUTIC ACTIVITIES: CPT | Mod: GO

## 2021-10-12 PROCEDURE — 97110 THERAPEUTIC EXERCISES: CPT | Mod: GO

## 2021-10-12 RX ORDER — FOLIC ACID 1 MG/1
1 TABLET ORAL DAILY
DISCHARGE
Start: 2021-10-13 | End: 2022-08-03

## 2021-10-12 RX ORDER — ZINC OXIDE
OINTMENT (GRAM) TOPICAL
DISCHARGE
Start: 2021-10-12 | End: 2022-08-16

## 2021-10-12 RX ORDER — SEVELAMER CARBONATE FOR ORAL SUSPENSION 800 MG/1
1.6 POWDER, FOR SUSPENSION ORAL 3 TIMES DAILY
DISCHARGE
Start: 2021-10-12

## 2021-10-12 RX ORDER — ONDANSETRON 4 MG/1
4 TABLET, FILM COATED ORAL EVERY 8 HOURS PRN
DISCHARGE
Start: 2021-10-12 | End: 2022-08-03

## 2021-10-12 RX ORDER — LANOLIN ALCOHOL/MO/W.PET/CERES
100 CREAM (GRAM) TOPICAL DAILY
DISCHARGE
Start: 2021-10-13 | End: 2023-01-23

## 2021-10-12 RX ORDER — GABAPENTIN 100 MG/1
100 CAPSULE ORAL
DISCHARGE
Start: 2021-10-13 | End: 2022-07-07

## 2021-10-12 RX ORDER — HYDROMORPHONE HYDROCHLORIDE 1 MG/ML
SOLUTION ORAL
Refills: 0 | Status: SHIPPED | DISCHARGE
Start: 2021-10-12 | End: 2021-12-06

## 2021-10-12 RX ORDER — METHYLPHENIDATE HYDROCHLORIDE 5 MG/1
5 TABLET ORAL 2 TIMES DAILY
Refills: 0 | Status: SHIPPED | DISCHARGE
Start: 2021-10-13 | End: 2022-07-07

## 2021-10-12 RX ORDER — CYCLOBENZAPRINE HCL 10 MG
10 TABLET ORAL DAILY PRN
DISCHARGE
Start: 2021-10-12 | End: 2022-07-07

## 2021-10-12 RX ORDER — MIDODRINE HYDROCHLORIDE 5 MG/1
15 TABLET ORAL 3 TIMES DAILY
DISCHARGE
Start: 2021-10-12 | End: 2022-08-03

## 2021-10-12 RX ORDER — POLYETHYLENE GLYCOL 3350 17 G
4 POWDER IN PACKET (EA) ORAL
DISCHARGE
Start: 2021-10-12 | End: 2022-07-07

## 2021-10-12 RX ORDER — POTASSIUM CHLORIDE 1.5 G/1.58G
20 POWDER, FOR SOLUTION ORAL DAILY
DISCHARGE
Start: 2021-10-13 | End: 2024-04-02

## 2021-10-12 RX ORDER — TRANSGALACTOOLIGOSACCHARIDES 2.75 G
1 POWDER IN PACKET (EA) ORAL DAILY
DISCHARGE
Start: 2021-10-13 | End: 2022-08-31

## 2021-10-12 RX ORDER — GUAR GUM
1 PACKET (EA) ORAL 3 TIMES DAILY
DISCHARGE
Start: 2021-10-12 | End: 2024-05-21

## 2021-10-12 RX ORDER — PROCHLORPERAZINE MALEATE 5 MG
5 TABLET ORAL EVERY 6 HOURS PRN
DISCHARGE
Start: 2021-10-12 | End: 2022-08-03

## 2021-10-12 RX ORDER — VIT E ACET/GLY/DIMETH/WATER
LOTION (ML) TOPICAL 2 TIMES DAILY PRN
DISCHARGE
Start: 2021-10-12 | End: 2024-07-23

## 2021-10-12 RX ORDER — HYDROXYZINE HYDROCHLORIDE 25 MG/1
25-50 TABLET, FILM COATED ORAL 3 TIMES DAILY PRN
DISCHARGE
Start: 2021-10-12 | End: 2022-07-07

## 2021-10-12 RX ORDER — ACETAMINOPHEN 325 MG/10.15ML
325 LIQUID ORAL EVERY 4 HOURS PRN
DISCHARGE
Start: 2021-10-12 | End: 2024-07-09

## 2021-10-12 RX ORDER — SODIUM BICARBONATE 325 MG/1
325 TABLET ORAL EVERY 4 HOURS
DISCHARGE
Start: 2021-10-12

## 2021-10-12 RX ADMIN — Medication 5 ML: at 08:08

## 2021-10-12 RX ADMIN — SODIUM BICARBONATE 325 MG: 325 TABLET ORAL at 16:29

## 2021-10-12 RX ADMIN — Medication 40 MG: at 08:08

## 2021-10-12 RX ADMIN — HYDROMORPHONE HYDROCHLORIDE 1 MG: 1 SOLUTION ORAL at 10:51

## 2021-10-12 RX ADMIN — ACETAMINOPHEN 325 MG: 325 SOLUTION ORAL at 08:07

## 2021-10-12 RX ADMIN — PANCRELIPASE 2 CAPSULE: 24000; 76000; 120000 CAPSULE, DELAYED RELEASE PELLETS ORAL at 00:04

## 2021-10-12 RX ADMIN — HYDROXYZINE HYDROCHLORIDE 50 MG: 25 TABLET, FILM COATED ORAL at 16:05

## 2021-10-12 RX ADMIN — HYDROXYZINE HYDROCHLORIDE 50 MG: 25 TABLET, FILM COATED ORAL at 08:07

## 2021-10-12 RX ADMIN — Medication 1 PACKET: at 08:10

## 2021-10-12 RX ADMIN — Medication 6 MG: at 00:05

## 2021-10-12 RX ADMIN — ACETAMINOPHEN 325 MG: 325 SOLUTION ORAL at 04:45

## 2021-10-12 RX ADMIN — CYCLOBENZAPRINE 10 MG: 10 TABLET, FILM COATED ORAL at 14:16

## 2021-10-12 RX ADMIN — METHYLPHENIDATE HYDROCHLORIDE 5 MG: 5 TABLET ORAL at 12:37

## 2021-10-12 RX ADMIN — HYDROMORPHONE HYDROCHLORIDE 1 MG: 1 SOLUTION ORAL at 14:16

## 2021-10-12 RX ADMIN — HYDROMORPHONE HYDROCHLORIDE 1 MG: 1 SOLUTION ORAL at 17:05

## 2021-10-12 RX ADMIN — PANCRELIPASE 2 CAPSULE: 24000; 76000; 120000 CAPSULE, DELAYED RELEASE PELLETS ORAL at 12:37

## 2021-10-12 RX ADMIN — THIAMINE HCL TAB 100 MG 100 MG: 100 TAB at 08:07

## 2021-10-12 RX ADMIN — MIDODRINE HYDROCHLORIDE 15 MG: 5 TABLET ORAL at 08:08

## 2021-10-12 RX ADMIN — SODIUM BICARBONATE 325 MG: 325 TABLET ORAL at 12:32

## 2021-10-12 RX ADMIN — HYDROMORPHONE HYDROCHLORIDE 1 MG: 1 SOLUTION ORAL at 04:39

## 2021-10-12 RX ADMIN — PROCHLORPERAZINE MALEATE 5 MG: 5 TABLET ORAL at 08:08

## 2021-10-12 RX ADMIN — HYDROMORPHONE HYDROCHLORIDE 1 MG: 1 SOLUTION ORAL at 00:04

## 2021-10-12 RX ADMIN — SODIUM BICARBONATE 325 MG: 325 TABLET ORAL at 04:40

## 2021-10-12 RX ADMIN — POTASSIUM CHLORIDE 20 MEQ: 1.5 POWDER, FOR SOLUTION ORAL at 08:07

## 2021-10-12 RX ADMIN — METHYLPHENIDATE HYDROCHLORIDE 5 MG: 5 TABLET ORAL at 08:07

## 2021-10-12 RX ADMIN — SODIUM BICARBONATE 325 MG: 325 TABLET ORAL at 08:07

## 2021-10-12 RX ADMIN — FOLIC ACID 1 MG: 1 TABLET ORAL at 08:08

## 2021-10-12 RX ADMIN — SODIUM BICARBONATE 325 MG: 325 TABLET ORAL at 00:04

## 2021-10-12 RX ADMIN — PANCRELIPASE 2 CAPSULE: 24000; 76000; 120000 CAPSULE, DELAYED RELEASE PELLETS ORAL at 08:07

## 2021-10-12 RX ADMIN — SEVELAMER CARBONATE 1.6 G: 800 POWDER, FOR SUSPENSION ORAL at 16:30

## 2021-10-12 RX ADMIN — Medication 1 PACKET: at 14:17

## 2021-10-12 RX ADMIN — PANCRELIPASE 2 CAPSULE: 24000; 76000; 120000 CAPSULE, DELAYED RELEASE PELLETS ORAL at 16:29

## 2021-10-12 RX ADMIN — MIDODRINE HYDROCHLORIDE 15 MG: 5 TABLET ORAL at 14:16

## 2021-10-12 RX ADMIN — INFLUENZA A VIRUS A/VICTORIA/2570/2019 IVR-215 (H1N1) ANTIGEN (FORMALDEHYDE INACTIVATED), INFLUENZA A VIRUS A/TASMANIA/503/2020 IVR-221 (H3N2) ANTIGEN (FORMALDEHYDE INACTIVATED), INFLUENZA B VIRUS B/PHUKET/3073/2013 ANTIGEN (FORMALDEHYDE INACTIVATED), AND INFLUENZA B VIRUS B/WASHINGTON/02/2019 ANTIGEN (FORMALDEHYDE INACTIVATED) 0.5 ML: 15; 15; 15; 15 INJECTION, SUSPENSION INTRAMUSCULAR at 16:35

## 2021-10-12 RX ADMIN — PANCRELIPASE 2 CAPSULE: 24000; 76000; 120000 CAPSULE, DELAYED RELEASE PELLETS ORAL at 04:39

## 2021-10-12 RX ADMIN — SEVELAMER CARBONATE 1.6 G: 800 POWDER, FOR SUSPENSION ORAL at 08:08

## 2021-10-12 RX ADMIN — HYDROMORPHONE HYDROCHLORIDE 1 MG: 1 SOLUTION ORAL at 08:06

## 2021-10-12 ASSESSMENT — ACTIVITIES OF DAILY LIVING (ADL)
ADLS_ACUITY_SCORE: 15
ADLS_ACUITY_SCORE: 15
ADLS_ACUITY_SCORE: 16
ADLS_ACUITY_SCORE: 15
ADLS_ACUITY_SCORE: 16

## 2021-10-12 NOTE — PROGRESS NOTES
Care Management Discharge Note    Discharge Date: 10/12/2021       Discharge Disposition: [facility]    Discharge Services: per LTACH    Discharge DME: None    Discharge Transportation: HE stretcher transport @ 5:45 pm    Education Provided on the Discharge Plan:  Yes  Persons Notified of Discharge Plans: Yes  Patient/Family in Agreement with the Plan: yes    Handoff Referral Completed: Yes    Additional Information:  Patient to discharge tonight at 5:45 pm to [facility] via HE stretcher transport.    MD handoff  And Nursing handoff information given to appropriate parties.     ROB Blankenship RN, BSN  5B RN Care Coordinator  [phone] phone  [phone] pager    Weekend or Holiday Care Coordinator [phone] pager  Job Code [phone]

## 2021-10-12 NOTE — CONSULTS
[facility] Nurse Inpatient Wound Assessment   Reason for consultation: Evaluate and treat Old GJ site wounds  9/22- New consult for future skin breakdown on current G-tube site  Assessment  abdominal wounds due to Surgical Wound  Status: healing, follow up  G-tube site- stable- no open area noted  Treatment Plan    Old GI tube site - every other day    Cleanse the area with NS and pat dry.    Apply No sting film barrier to periwound skin.    Cover wound with Mepilex.    Change dressing daily per GI team    G- tube site- Instill 10-20 ml of NS into the current gauze before removal. Let it sit for about 10 mins to soak the dressing and easy removal to minimize pain.  Gently remove the dressing and cleanse with NS, then pat dry.  Paint with no sting film barrier to protect skin, apply two layers of barrier.  Place one piece of fenestrated gauze.  Secure the tube.    Orders updated and spoke with RN   Recommended provider order: none  Paged out attending and nephrology team.   WO Nurse follow-up plan:every other week  Nursing to notify the Provider(s) and re-consult the WO Nurse if wound(s) deteriorates or new skin concern.    Patient History  According to provider note(s):  Dax Villareal is a 30 year old male with PMHx significant for alcohol use disorder who has had a prolonged hospitalization at [facility] in [place] for acute alcohol hepatitis/end stage liver disease complicated by hepatic encephalopathy, acute renal failure requiring hemodialysis, acute hypoxemic respiratory failure and septic shock secondary to acute necrotizing pancreatitis. Transferred to [facility] ICU on 6/28/2021 for further management of necrotizing pancreatitis by interventional GI, possible necrosectomy.    Objective Data  Containment of urine/stool: Incontinence Protocol    Active Diet Order  Orders Placed This Encounter      Dialysis Diet      Output:   I/O last 3 completed shifts:  In: 2058 [P.O.:120; NG/GT:903]  Out: 300 [Emesis/NG  output:300]    Risk Assessment:   Sensory Perception: 3-->slightly limited  Moisture: 3-->occasionally moist  Activity: 1-->bedfast  Mobility: 2-->very limited  Nutrition: 2-->probably inadequate  Friction and Shear: 2-->potential problem  Tyrel Score: 13                          Labs:   Recent Labs   Lab 10/11/21  1258   HGB 8.8*   WBC 7.9       Physical Exam  Areas of skin assessed: focused old G-tube site     Wound Location:  Right upper quadrant  Date of last photo none taken- pt declined.  Wound History: New G-tube site- Small bloody drainage noted. Blanchable erythema with minimal dry drainage. No open area.    Old G-tube closure site-         3x4x0.0 cm with healed tissue with small dry bloody drainage on old dressing. Pain is much better compared to last week. Healing well.       Interventions  Visual inspection and assessment completed   Wound Care Rationale Protect periwound skin, Provide protection  and Pain reduction  Wound Care: completed by RN- pt requested to let him sleep prior to OT session  Supplies: floor stock and discussed with RN  Current off-loading measures: Pillows under calves and Pillows  Current support surface: Standard  Atmos Air mattress  Education provided to: plan of care, wound progress, Moisture management and Off-loading pressure   Discussed plan of care with Patient and Nurse    Deneen Matthews RN CWOCN

## 2021-10-12 NOTE — PROGRESS NOTES
CLINICAL NUTRITION SERVICES - REASSESSMENT NOTE     Nutrition Prescription    RECOMMENDATIONS FOR MDs/PROVIDERS TO ORDER:  Diet as tolerated for comfort only     Malnutrition Status:    Severe malnutrition in the context of acute on chronic illness     Recommendations already ordered by Registered Dietitian (RD):  No changes to renal TF formula. Tolerating well per patient.     Future/Additional Recommendations:  GI status, TF tolerance        EVALUATION OF THE PROGRESS TOWARD GOALS   Diet:   Dialysis since 9/28 + 1200 ml FR     Nutrition Support: ( on TF since 6/30 admit)  Access: Jejunostomy tube of the PEG/J replaced 9/13/21 (G-tube open to gravity at all times).   Dosing wt: 65 kg more consistent wt this admit on 9/18/21     EN: Renal formula of Novasource Renal @ 45 ml/hr  --Novasource Renal @ 45 ml/hr (1080 ml) provides 2160 kcal (33 kcal/kg),  98 gm pro (1.5 gm/kg ), 198 gm CHO, 108 gm fat, 774 ml free water, and 0 gm fiber daily.      Modules:   --Protein modules: Prosource TF free 1 packet daily via FT ( 90 kcal, 15 gm protein)  --PERT:  Creon 24, 2 capsules + 325 mg bicarb solution, Q 4 hours via FT. Providing 2667 units lipase/total gm fat in tube feeds.   --Fiber: Continue with Nutrisource fiber 1 packet TID      FWF: 25 ml/hr via feed and flush (600 ml daily FWF ) +  (774 ml free water from TFs)  = 1374 ml daily fluid from TFs + water flushes.       Intake:   PO: minimal solid intake.     EN:  On TFs @ 45 ml/hr. G-tube to gravity and J-tube for TFs. Abdomen fistula (with sutures). Has intermittent nausea in which, patient hold tube feeds.     Average 7 day of EN intake: 770 ml daily ( met 71% estimated goal volume)  TF + prosource Free = Provided 1635 kcal /day ( 25 kcal/kg ) and 85 gm protein ( 1.3 gm/kg).       ASSESSED NUTRITION NEEDS per 65 kg wt on 9/18  Estimated Energy Needs: 1950- 2275 kcals/day, (30-35 kcals/kg)   Justification: Hypermetabolism with chronic illness, (  pancreatitis/HD/ESLD)  Estimated Protein Needs: 98++ g/day (1.5 ++ g/kg)   Justification: Pancreatitis and Hypercatabolism with critical illness / HD repletion,    Estimated Fluid Needs: Per provider pending fluid status (ESLD/HD/Anuric)       FINDINGS   Chart reviewed:   -Decompensated liver with HE and HRS (requiring HD since 5/26/21),  necrotizing pancreatitis/infected pseudocyst, s/p PEG-J placement on 7/13/21.     --Admitted secondary to SBP (treated)  --ESRD ( Anuric and on HD)  --HE, Mental status improved with aggressive lactulose and rifaximin, which was discontinued.    --Necrotizing pancreatitis with infected pancreatic pseudocyst s/p RP Drain and Cystogastrostomy, and persistent right pleural effusion. Had GI stenting of pancreas x4. Multiple Necrosectomies, last one on 8/11. 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 Patient has large fistula with draining from the old G-tube site and underwent endoscopic closure (9/20/21). No leakage post suture. Diet advanced on 9/29.     WOC RN note on 10/12/21:  abdominal wounds due to Surgical Wound  Status: healing, follow up  G-tube site- stable- no open area noted     WT trend:   Wt's are unreliable. Per discussion with nursing staff, likely due to bed wt not being placed at zero. EDW is not available.   --66.5 kg (146 lb 9.7 oz) most recent wt on 10/11/21  - Admission weight 80.2 kg (6/28/21).   --Significant wt loss 17% since admit per wt trend review. Patient with substantial high daily g-tube output due to GOO and frequent hold on TF support with nausea and during HD runs.     Meds:  Sodium Bicarbonate/Creon with TFs, Creon with meals,   Nephronex 5 ml daily per FT, calcium carbonate 500 mg daily, Sevelamer carbonate 0.8 gm packet TID PO  Remains on  thiamine,  Folate.    Labs:   Fecal elastase: 162 (L) on    BUN: 79, Cr: 5.38 , Na+: 124(L)-> On HD -> Nephrology is following   -> (K+:3.9, Phos: 6.3( on 10/6) -> on renal TFs    Bicarb: 25 (normal range)   Total bili:0.9 - normal on 10/1  B (elevated)     GI:  Off Lactulose/Rifaxamin.   Having 1-4 stool intermittently. On Nutrisource fiber + PERT  Not on any anti motility agents     MALNUTRITION  % Intake: On TF's with frequent interruptions, <75%>1 month - Severe   % Weight Loss: 17% net wt loss since admit ~ 3.5 month   Subcutaneous Fat Loss: Upper arms and legs severe   Muscle Loss: severe upper arms and legs   Fluid Accumulation/Edema: 1+ Generalized edema ( trace), 0 BLE   Malnutrition Diagnosis: Severe malnutrition in the context of Chronic condition     Previous Goals   Total avg nutritional intake to meet a minimum of 30 kcal/kg and 1.5 gm PRO/kg daily (per dosing wt 65 kg wt on 21 ).  Evaluation: met close to the needs      Previous Nutrition Diagnosis  Inadequate oral intake related to anorexia as evidence by reliant on TF support to provide for full nutrition needs   Evaluation: No change    CURRENT NUTRITION DIAGNOSIS  Inadequate oral intake related to anorexia as evidence by reliant on TF support to provide for full nutrition needs     INTERVENTIONS  Implementation  Enteral Nutrition:No changes to current feeds     Goals  Total avg nutritional intake to meet a minimum of 30 kcal/kg and 1.5 gm PRO/kg daily (per dosing wt 65 kg wt on 21 ).    Monitoring/Evaluation  Progress toward goals will be monitored and evaluated per protocol.      Jesica Blunt RD/MARYELLEN  Pager [phone]

## 2021-10-12 NOTE — PROGRESS NOTES
Dental Service Progress Note    Interval History   Dax Villareal is a 30 year old male with medical history significant for alcohol use disorder who has had a prolonged hospitalization at [facility] in [place] for acute alcohol hepatitis/end stage liver disease complicated by hepatic encephalopathy, acute renal failure requiring hemodialysis, acute hypoxemic respiratory failure and septic shock secondary to acute necrotizing pancreatitis. The patient was transported to [facility] dental clinic on 10/07/2021. However, dental extraction were not performed because the patient was deemed not able to tolerate the procedures. Attempted to look for OR time for extraction of the offended teeth. No OR time available at this moment. Attempted to look for OR time for extraction of the offended teeth. No OR time available at this moment.     Exam   Patient presents gross active decay on all his front maxillary teeth (left second premolar to right second premolar,  first and third left mandibular molar, very poor oral hygiene, ensitivity to thermal change on all his teeth (patient puts ice in his mouth to relief from pain), no intra-oral swelling, plaque and tartar on all teeth.       Plan  Since the patient is going to be discharged soon and the pain is controlled, we recommend the patient to be seen as an outpatient after discharged.       Isael Blue   PGY-2  Pager: [phone]

## 2021-10-12 NOTE — PLAN OF CARE
"7217-7242 At start of shift ask pt to think of goals to complete for the day: oral care, ROM, up to chair, oral intake etc. When re asked pt stated his goal was \"to sleep\". ADL offered multiple times. Did allow for wound cares. Dialysis completed today. Labs completed in dialysis. Plan of care: encourage ADLS, placement pending.     Had one episode of 200 mL emesis this AM. G tube to gravity: 100 mL out during shift. Endorses pain in left abdomen. Rate 4-6/10. Dilaudid given q3hr.  "

## 2021-10-12 NOTE — PLAN OF CARE
Patient discharged from unit to [facility] in stable condition at 1745 via medical transport. RN to [place] Report given to LTACH ROB Robertson. Personal belongings taken by medical transport personnel.

## 2021-10-12 NOTE — PLAN OF CARE
"BP (!) 100/39 (BP Location: Left arm)   Pulse 88   Temp 98  F (36.7  C) (Axillary)   Resp 16   Ht 1.676 m (5' 6\")   Wt 66.5 kg (146 lb 9.7 oz)   SpO2 92%   BMI 23.66 kg/m  on 3L n/c.  Shift: 8430-5797  Neuro: A&O x4  Cardiac: Regular 80-90s  Respiratory: Lungs clear diminished in bases.  GI/: Had BM last evening.GT to gravity 50 out.Had dialysis yesterday.  Diet/Appetite: TF at 45hr and tolerated well.having ice chips ,denied nausea.  Activity: Repositioned at 0300am but declined after.  Pain: Abd pain and medicated with dilaudid every 3 hrs  Skin: Abd drsg changed last evening ,old   LDA's: R CVC ,No piv  Other: Pt has been refusing cares and VS every 4 hrs.  Plan:  Will continue to stay on top of pain and give nausea med before AM meds.Encourage to move more.  "

## 2021-10-12 NOTE — PLAN OF CARE
"BP (!) 96/39 (BP Location: Left arm)   Pulse 83   Temp 97.4  F (36.3  C) (Oral)   Resp 18   Ht 1.676 m (5' 6\")   Wt 66.5 kg (146 lb 9.7 oz)   SpO2 97%   BMI 23.66 kg/m      Care from: 1782-7917      VS & Pain: VSS ex hypotension. Received PRN dilauded q4h.    Neuro: A&O x4.    Peripheral neurovascular: Slight numbness and tingling in bilateral feet.    GI/: Had medium BM today.     Nutrition: 1200 mL fluid restriction. Tube feed running at 45mL/hr.    Skin: Blanchable redness on sacrum/coccyx.    Musculoskeletal: Generalized weakness and limited mobility.    Lines: R CVC. No piv.    Events this shift: Uses call light appropriately to make needs known.    Plan: Continue to follow plan of care and prepare for discharge.  "

## 2021-10-12 NOTE — DISCHARGE SUMMARY
[facility]  Discharge Summary - Medicine & Pediatrics       Date of Admission:  6/28/2021  Date of Discharge:  10/12/21  Discharging Provider: Dr. Alejandro Duncan  Discharge Service: [facility] 3  Discharge summary completed by Dr. ABHILASH Chavez    Discharge Diagnoses   Septic Shock  Necrotizing alcoholic pancreatitis w/ infected pseudocyst  Secondary Bacterial Peritonitis 2/2 infected pseudocyst with VRE  Gastic Outlet Obstruction  Right Pancreatic Hydrothorax  Alcoholic Hepatitis  Coagulation Defect  Hepatic Abscess  ESRD due to ATN  Anemia due to Chronic Illness  Hematochezia  Hemoperitoneum  Lactic Acidosis  Hypokalemia  Chronic hyponatremia   Severe malnutrition    Follow-ups Needed After Discharge   Follow-up Appointments     Follow Up and recommended labs and tests      Dialysis MWF.  Follow-up in dental clinic for extractions and treatment of multiple   dental caries and periapical abscess.  Follow up with primary care provider in 1-2 weeks or as needs arise.   Arrange outpatient sleep study to evaluate for sleep apnea. Consider trial   of Reglan to see if it improves tolerance of oral intake.  If overall health improves in the future and surgery becomes an option,   follow-up with GI for possible whipple to treat gastric-outlet   obstruction/duodenal stricture due to chronic pancreatitis.             Unresulted Labs Ordered in the Past 30 Days of this Admission     Date and Time Order Name Status Description    8/21/2021  7:15 AM Prepare red blood cells (unit) Preliminary     7/14/2021  4:30 AM Prepare red blood cells (unit) Preliminary     7/13/2021  1:16 PM Prepare red blood cells (unit) Preliminary     6/29/2021  7:53 AM Plasma prepare order unit In process           Discharge Disposition   Discharged to long-term care facility  Condition at discharge: Fair    Hospital Course   Dax Villareal was admitted to the [facility] ICU from [facility] in [place] on  6/28/2021 for septic shock due to necrotizing pancreatitis with CT on admit showing a mature collection (measuring ~[id number].).  The following problems were addressed during his hospitalization:     Septic Shock  Necrotizing alcoholic pancreatitis w/ infected pseudocyst  Secondary Bacterial Peritonitis 2/2 infected pseudocyst with VRE  Chronic Pain  Admitted to the [facility] ICU on 6/28/2021 from [facility] in [place] for septic shock due to necrotizing pancreatitis. CT on admit showing mature collection with enhancing wall measuring ~[id number]. IR placed perc drain 6/29. Repeat imaging 7/18 revealed walled off necrotic collection amenable to endoscopic transluminal drainage with cystgastrostomy stenting with necrosectomy on 7/21/21. Complete necrosectomy on 8/11. Antibiotics (cefepime and metronidazole) were discontinued on 8/25 after left flank drain removed, re-initiated in the setting of fever on 9/2 for a one week course. Repeat CT on 9/13 with interval improvement in fluid collections.  - Will likely need intermittent antibiotics with fluid collections occasionally with bacterial growth moving forward as to be expected in nec panc   - Opioid taper plan (slow version dates below, see palliative care note for more details):              -10/8 discontinue 1mg TID scheduled hydromorphone              -10/18 decrease hydromorphone to 1mg q4h prn (from q3h)              -11/01 decrease hydromorphone to 1mg QID prn              -11/15 decrease hydromorphone to 1mg TID prn (continue this until no longer on opioids)  - Gabapentin 100mg post-dialysis (three times per week) (per palliative, if tolerates without sedation, could uptitrate to 300mg 3 times per week after dialysis)     Asymptomatic hypotension   Hypovolemia  Patient with substantial daily NG output and poor PO. Also note significant weight loss over the past few weeks. He has needed intermittent boluses of normal saline for euvolemia.   - Weight 78kg  on 8/13. Then 55 kg, weight 68.5kg on 9/22. Concern for weight loss vs. targeting dry weight.  - Monitor vitals and output post dialysis, consider fluids prn as outpatient  - cont midodrine 15 TID      Gastic Outlet Obstruction  GJ replacement c/b fistula with high output  High G tube output due to the cystogastric connection/fluid drainage noted on 8/5. Per GI, possibly related to gastric outlet from severe abdominal inflammation and is expected. KUB negative for obstructive gas pattern. Ileus from narcotics considered, but output not consistent with tube feeds. Nausea and vomiting improved after cystogastrectomy. GJ replaced on 9/13 c/f G-tube fistula with high output. GI closed the site on 9/19.   9/20/21 - EGD to close fistula completed by GI  - Consider reglan as outpatient to assist with tolerance of oral intake, as patient not currently surgical candidate  - Wound care discharge orders -     Old GI tube site - every other day    Cleanse the area with NS and pat dry.    Apply No sting film barrier to periwound skin.    Cover wound with Mepilex.    Change dressing daily per GI team     G- tube site- Instill 10-20 ml of NS into the current gauze before removal. Let it sit for about 10 mins to soak the dressing and easy removal to minimize pain.  Gently remove the dressing and cleanse with NS, then pat dry.  Paint with no sting film barrier to protect skin, apply two layers of barrier.  Place one piece of fenestrated gauze.  Secure the tube.     Periapical abscess: L 3rd maxillary molar  Dental Caries  - Dentistry consulted, and discussed with oral surgery  - Amoxicillin for 7d course (9/21-9/27)  - Needs further dental follow up as outpatient - consult ordered on discharge     Right Pancreatic Hydrothorax  Increased dyspnea with CXR on 7/19 with further imaging and thoracentesis confirming a hepatic/pancreatic hydrothorax. Thoracentesis PRN for dyspnea.     Alcoholic Hepatitis  Coagulation Defect  Received 1  "week of steroids at [facility] for elevated INR and altered mental status. Not a liver transplant candidate. Mental status improved with aggressive lactulose and rifaxamin, which was discontinued without return of AMS.     ESRD due to ATN  Hyperphosphatemia   Stage III RADHA due to ATN from septic shock without recovery for >3 months. Line is TDC from [facility] dialyzed on Tu, Th, Sa schedule. Sevelemer TID.   - Will need follow-up and outpatient dialysis     Hyponatremia, stable  Patient with down-trending sodium the week of 8/27; likely due to substantial NG output and low solute intake and hepatic dysfunction. Stable 127-128.     Anemia due to Chronic Illness  Hematochezia  Hemoperitoneum  Baseline hgb 17. Running between 6-8 in setting of chronic illness, new ESRD with epocrit per Nephrology, frequent lab draws and procedures. Hematochezia on 8/8 after starting standard intensity heparin. Paracentesis on 8/21 with hemoperitoneum. No active bleeding. Required a unit of PRBC on 9/4 in the setting of several fluid boluses.   - Goal hgb >7.0, trend Hgb     Alcohol Use Disorder  Counseled patient on complete alcohol cessation. Not interested in additional services at this time. Continue daily folic acid and thiamine.     Lactic Acidosis  Intermittent Lactic acidosis in the setting of sepsis, ESRD, bleeding,     Hypokalemia  Noted in the setting of high gastric output and improved once G-tube output decreased     Suspected sleep apnea  Patient requests supplemental oxygen at night as his friends have told him he may have sleep apnea and he feels that the oxygen helps him \"breathe lighter\". No hypoxia.  - Outpatient sleep study     Severe Malnutrition  Poor oral intake in the setting of necrotizing pancreatitis. PEG tube placed 7/13. Dietitian consulted and continuous tube feeds with goal of 65 ml/hr with continuous pancreatic enzymes    Consultations This Hospital Stay   PHYSICAL THERAPY ADULT IP " CONSULT  OCCUPATIONAL THERAPY ADULT IP CONSULT  GI PANCREATICOBILIARY ADULT IP CONSULT  NEPHROLOGY ICU IP CONSULT  PHYSICAL THERAPY ADULT IP CONSULT  OCCUPATIONAL THERAPY ADULT IP CONSULT  PHARMACY TO DOSE VANCO  INTERVENTIONAL RADIOLOGY ADULT/PEDS IP CONSULT  WOUND OSTOMY CONTINENCE NURSE  IP CONSULT  SPEECH LANGUAGE PATH ADULT IP CONSULT  PATIENT LEARNING CENTER IP CONSULT  NUTRITION SERVICES ADULT IP CONSULT  PHARMACY IP CONSULT  INTERNAL MEDICINE PROCEDURE TEAM ADULT IP CONSULT [facility]  INTERNAL MEDICINE PROCEDURE TEAM ADULT IP CONSULT [facility]  PALLIATIVE CARE ADULT IP CONSULT  INTERNAL MEDICINE PROCEDURE TEAM ADULT IP CONSULT [facility]  NUTRITION SERVICES ADULT IP CONSULT  PHARMACY IP CONSULT  INFECTIOUS DISEASE GENERAL ADULT IP CONSULT  VASCULAR ACCESS CARE ADULT IP CONSULT  INTERNAL MEDICINE PROCEDURE TEAM ADULT IP CONSULT [facility]  INTERNAL MEDICINE PROCEDURE TEAM ADULT IP CONSULT [facility]  INTERVENTIONAL RADIOLOGY ADULT/PEDS IP CONSULT  INTERNAL MEDICINE PROCEDURE TEAM ADULT IP CONSULT [facility]  INTERVENTIONAL RADIOLOGY ADULT/PEDS IP CONSULT  INTERNAL MEDICINE PROCEDURE TEAM ADULT IP CONSULT [facility]  INTERNAL MEDICINE PROCEDURE TEAM ADULT IP CONSULT [facility]  INFECTIOUS DISEASE GENERAL ADULT IP CONSULT  INTERNAL MEDICINE PROCEDURE TEAM ADULT IP CONSULT [facility]  INTERVENTIONAL RADIOLOGY ADULT/PEDS IP CONSULT  SPEECH LANGUAGE PATH ADULT IP CONSULT  SPEECH LANGUAGE PATH ADULT IP CONSULT  INTERVENTIONAL RADIOLOGY ADULT/PEDS IP CONSULT  INTERVENTIONAL RADIOLOGY ADULT/PEDS IP CONSULT  PHARMACY IP CONSULT  PHARMACY IP CONSULT  HEMATOLOGY ADULT IP CONSULT  INTERNAL MEDICINE PROCEDURE TEAM ADULT IP CONSULT [facility] - DIALYSIS NON TUNNELED CENTRAL LINE PLACEMENT  INTERVENTIONAL RADIOLOGY ADULT/PEDS IP CONSULT  WOUND OSTOMY CONTINENCE NURSE  IP CONSULT  INTERVENTIONAL  RADIOLOGY ADULT/PEDS IP CONSULT  INTERNAL MEDICINE PROCEDURE TEAM ADULT IP CONSULT [facility] - [facility]  INTERVENTIONAL RADIOLOGY ADULT/PEDS IP CONSULT  INTERNAL MEDICINE PROCEDURE TEAM ADULT IP CONSULT [place] - THORACENTESIS  INTERNAL MEDICINE PROCEDURE TEAM ADULT IP CONSULT [place] - [facility]  SMOKING CESSATION PROGRAM IP CONSULT  INTERNAL MEDICINE PROCEDURE TEAM ADULT IP CONSULT [facility]  INTERNAL MEDICINE PROCEDURE TEAM ADULT IP CONSULT [place] - TH[facility]ACENTES[facility]  VASCULAR ACCESS CARE ADULT IP CONSULT  PHARMACY TO DOSE VANCO  VASCULAR ACCESS CARE ADULT IP CONSULT  WOUND OSTOMY CONTINENCE NURSE  IP CONSULT  PHYSICAL THERAPY ADULT IP CONSULT  WOUND OSTOMY CONTINENCE NURSE  IP CONSULT  INTERNAL MEDICINE PROCEDURE TEAM ADULT IP CONSULT [facility] - TH[facility]ACENTESIS  INTERNAL MEDICINE PROCEDURE TEAM ADULT IP CONSULT [place] - THORACENTESIS  DENTISTRY ADULT IP CONSULT  PSYCHIATRY IP CONSULT  ORAL MAXILLOFACIAL SURGERY ADULT IP CONSULT  WOUND OSTOMY CONTINENCE NURSE  IP CONSULT  PALLIATIVE CARE ADULT IP CONSULT  PSYCHOLOGY ADULT IP CONSULT  INFECTIOUS DISEASE GENERAL ADULT IP CONSULT  WOUND OSTOMY CONTINENCE NURSE  IP CONSULT  ORTHOSIS EXTREMITY LOWER REFERRAL IP CONSULT  INTERNAL MEDICINE PROCEDURE TEAM ADULT IP CONSULT [facility] - THORACENTESIS    Code Status   Full Code       The patient was discussed with Dr. Alejandro Duncan.    Juan Alberto Chavez MD  [address] [address]  Phone: [phone]  ______________________________________________________________________    Physical Exam   Vital Signs: Temp: 97.4  F (36.3  C) Temp src: Oral BP: (!) 96/39 Pulse: 83   Resp: 18 SpO2: 97 % O2 Device: Nasal cannula Oxygen Delivery: 3 LPM  Weight: 146 lbs 9.69 oz  Constitutional: NAD, pleasant, cooperative with exam.  Chronically ill-appearing, but stable.  Extremity musculature appear atrophic, though nontender.  HEENT: Sclera anicteric,  Normal oropharynx without ulcers or exudate, MMM  CV: Tachycardic, normal rhythm, no murmurs, gallops or rubs. JVD normal   Respiratory: CTAB, no wheezing, crackles or rales. Non-labored  Abd: Soft, NT/ND, +bs, no HSM.  Dressings clean, dry, and intact surrounding G-tube.  Skin: No lesions or rashes over exposed areas, normal color, texture and turgor  Neuro: AAO x 3.  Able to move extremities in exam bed without difficulty        Primary Care Physician   Brianna Burks    Discharge Orders      General info for SNF    Length of Stay Estimate: Long Term Care  Condition at Discharge: Stable  Level of care:skilled   Rehabilitation Potential: Fair  Admission H&P remains valid and up-to-date: Yes  Recent Chemotherapy: N/A  Use Nursing Home Standing Orders: Yes     Mantoux instructions    Give two-step Mantoux (PPD) Per Facility Policy Yes     Follow Up and recommended labs and tests    Dialysis MWF.  Follow-up in dental clinic for extractions and treatment of multiple dental caries and periapical abscess.  Follow up with primary care provider in 1-2 weeks or as needs arise. Arrange outpatient sleep study to evaluate for sleep apnea. Consider trial of Reglan to see if it improves tolerance of oral intake.  If overall health improves in the future and surgery becomes an option, follow-up with GI for possible whipple to treat gastric-outlet obstruction/duodenal stricture due to chronic pancreatitis.     Reason for your hospital stay    Dear Dax Villareal    Your were hospitalized at [facility] with severe alcoholic hepatitis leading to liver congestion causing confusion (hepatic encephalopathy), an infected cyst in your pancreas caused sepsis (an infection in your blood) which caused your kidneys to fail, they did not recover and you have continued to need dialysis. The infected cyst on your pancreas also caused irritation/inflammation in the lining of your abdomen. These problems and your,  now chronic, pancreatitis led to obstruction in your GI tract just after your stomach, this led to difficulty eating and drinking (and is still causing nausea and vomiting), in addition to your long hospital stay causing generalized illness and decondition led malnutrition  Your treatment consisted of treatment of your infections, dialysis for your kidney failure, a feeding tube (PEG-J) for tube feedings with your malnutrition and constrictions after your stomach. You also received treatment for a connection that formed from you gastric tube called a fistula which was repaired. You received intensive therapy to help with your deconditioning from your serious illness and the resulting prolonged hospital stay.  Over your hospitalization your infections improved and today you are ready to be discharged to a long term care [facility].  If you continue to get tube feeds, work with physical and occupation therapy to regain your strength and mobility, continue to get dialysis, closely follow-up with doctors to treat any infections and and maximize your health, and most importantly, continue to abstain from alcohol, you should continue to improve but if you develop fever, shortness of breath, light headedness, chest pain or any new or worsening symptoms please seek medical attention.    It was a pleasure meeting with you today. Thank you for allowing me and my team the privilege of caring for you today. You are the reason we are here, and I truly hope we provided you with the excellent service you deserve. Please let us know if there is anything else we can do for you so that we can be sure you are leaving completely satisfied with your care experience.    Your hospital unit at the time of discharge is 5B so if you have any questions please call the hospital at [phone] and ask to talk to a nurse on 5B.    Take care!    Marleny Monk MD  Internal Medicine-Pediatrics  [facility]     Wound care      Old GI  tube site - every other day    Cleanse the area with NS and pat dry.    Apply No sting film barrier to periwound skin.    Cover wound with Mepilex.    Change dressing daily per GI team     G- tube site- Instill 10-20 ml of NS into the current gauze before removal. Let it sit for about 10 mins to soak the dressing and easy removal to minimize pain.  Gently remove the dressing and cleanse with NS, then pat dry.  Paint with no sting film barrier to protect skin, apply two layers of barrier.  Place one piece of fenestrated gauze.  Secure the tube.     Activity - Up ad mahnaz     Full Code     Contact Isolation     Adult Formula Tube Feeding    Follow this regimen upon discharge: Orders Placed This Encounter      Fluid restriction 1500 ML FLUID      Adult Formula Drip Feeding: Continuous Novasource Renal; Jejunostomy; Goal Rate: 45; mL/hr; Medication - Feeding Tube Flush Frequency: At least 15-30 mL water before and after medication administration and with tube clogging     Advance Diet as Tolerated    Follow this diet upon discharge: Orders Placed This Encounter      Dialysis diet      Fluid restriction 1500 ML FLUID      Adult Formula Drip Feeding: Continuous Novasource Renal; Jejunostomy; Goal Rate: 45; mL/hr; Medication - Feeding Tube Flush Frequency: At least 15-30 mL water before and after medication administration and with tube clogging       Significant Results and Procedures   Most Recent 3 CBC's:Recent Labs   Lab Test 10/11/21  1258 10/06/21  1531 10/04/21  1330   WBC 7.9 7.1 9.0   HGB 8.8* 8.7* 9.3*   MCV 93 91 93    239 267     Most Recent 3 BMP's:Recent Labs   Lab Test 10/11/21  1258 10/08/21  1500 10/07/21  2234   * 124* 125*   POTASSIUM 3.9 3.0* 3.4   CHLORIDE 87* 86* 87*   CO2 25 24 25   BUN 79* 62* 46*   CR 5.38* 4.81* 3.95*   ANIONGAP 12 14 13   JANENE 10.4* 10.2* 10.2*   * 114* 113*   ,   Results for orders placed or performed during the hospital encounter of 06/28/21   XR Chest Port 1  View    Narrative    XR CHEST PORT 1 VIEW  6/29/2021 6:01 AM      HISTORY: Eval for opacities/consolidation    COMPARISON: 5/18/2021    FINDINGS:   Single AP view of the chest. Right tunneled IJ central line tip  overlying the superior cavoatrial junction. Left upper extremity PICC  tip overlying the low SVC. Trachea is midline. Cardiac silhouette is  enlarged. No pneumothorax. Small left and probable trace right pleural  effusions. Mild diffuse interstitial and airspace opacities.      Impression    IMPRESSION:   1. Tunneled right IJ central line tip overlying the superior  cavoatrial junction.  2. Cardiomegaly with mild diffuse interstitial and airspace opacities,  favor pulmonary edema and atelectasis. Superimposed infection is not  excluded.  3. Small left and probable trace right pleural effusions.    I have personally reviewed the examination and initial interpretation  and I agree with the findings.    LESLYE SCHMITT MD   CT Abdomen Pelvis w Contrast    Narrative    EXAMINATION: CT ABDOMEN PELVIS W CONTRAST, 6/29/2021 11:10 AM    TECHNIQUE:  Helical CT images from the lung bases through the  symphysis pubis were obtained with IV contrast. Contrast dose:  iopamidol (ISOVUE-370) solution 105 mL    COMPARISON: 6/25/2021    HISTORY: Pancreatitis, necrotizing    FINDINGS:    Abdomen and pelvis:  Sequela of necrotizing pancreatitis with large multilobulated  heterogenous fluid collection containing fat and debris located in the  central abdomen with enhancing wall measuring approximately 21.1 x  12.0 x 16.7 cm. There is some enhancement of portions of residual  pancreas. Large volume ascites.    Liver measures up to 26.4 cm in craniocaudal dimension with  heterogenous appearance of the parenchyma, mild surface nodularity and  recanalized paraumbilical vein suggestive of cirrhosis. Gallbladder is  unremarkable. No biliary dilation. Spleen measures up to 17 cm.  Adrenal glands are unremarkable. Enlarged kidneys with  symmetric  hypoenhancement and lack of corticomedullary differentiation. Urinary  bladder is decompressed. Rectal tube is present. No dilated bowel.  Diffuse small bowel wall thickening with stratification and  hyperenhancement.     Splenic vein and portal veins are patent. Celiac trunk and SMA are  patent. Multiple enlarged mesenteric lymph nodes in the central  abdomen. Diffuse hazy mesenteric stranding.    Lung bases: Small right and trace left pleural effusions. Associated  passive atelectasis in the lung bases. Bibasilar intralobular septal  thickening.    Bones and soft tissues: Diffuse anasarca. No acute osseous lesion.      Impression    IMPRESSION:   1.  Sequela of necrotizing pancreatitis with peripancreatic necrotic  fluid collection, large volume abdominal ascites and mesenteric edema.  2.  Thickened, edematous small bowel wall is likely due to reactive  enteritis secondary to pancreatitis.  3.  Hepatomegaly with steatosis and mild surface nodularity,  associated with splenomegaly and recanalized paraumbilical vein,  suggestive of cirrhosis with portal hypertension.  4.  Enlarged hypoenhancing kidneys with loss of corticomedullary  differentiation, suggestive of acute renal parenchymal disease. No  hydronephrosis or perinephric fat stranding.  5.  Small right and trace left pleural effusions, interlobular septal  thickening, and diffuse anasarca, likely related to fluid third  spacing.    I have personally reviewed the examination and initial interpretation  and I agree with the findings.    LISA LYNCH MD   CT Retroperitoneal Abscess Drainage    Narrative    Procedures 06/30/2021:  1. CT-guided abscess drain placement    History: left RP abscess drainage and placement of catheter in setting  of necrotizing pancreatitis    Comparison: CT abdomen pelvis 6/29/2021.    Staff: Cecil Mauricio MD    Fellow/Resident: JUAN MIGUEL Mcarthur M.D.    Monitoring: Patient was placed on continuous monitoring with  intravenous  conscious sedation administered by the IR nursing staff  and supervised by the IR attending. Patient remained stable throughout  the procedure.     Medications:  1. Versed IV: 1 mg  2. Fentanyl IV: 50 mcg  3. 6 cc 1% lidocaine    Sedation time, face-to-face: 18 minutes    DLP: 1597 mGy*cm    Findings/procedure:    Prior to the procedure, both verbal and written informed consent  obtained from the patient. Patient placed prone on the CT table.  Preprocedure scan obtained revealing large retroperitoneal fluid  collection. Adequate percutaneous approach for drain placement.     The back prepped and draped. Timeout performed. 1% Lidocaine used for  local analgesia. Under intermittent CT fluoroscopic guidance, a 5  [place] centesis needle catheter was advanced into  the fluid collection. CT image documenting needle catheter position  within the abscess was saved in the patient's record. Needle removed.     Over a guidewire, catheter was removed and tract dilated to 24 [place]ian.  24 [place]ian Thal-Quick catheter advanced over the wire into the  collection. Guidewire removed. Dark bloody fluid was aspirated and  sent for labs.    CT Fluoroscopy demonstrated adequate placement of the drainage  catheter with the fluid collection. Representative CT images were  saved in the patient's record.    Catheter secured to the skin with a 2-0 Ethilon retention suture.  Dressing applied and catheter connected to bag drainage.    Estimate blood loss: Minimal    Specimens: Dark bloody fluid was aspirated and sent for labs.      Impression    Impression:  Uncomplicated CT fluoroscopic guided left retroperitoneal 24 Fr Thal  quick drain placement.     Plan:   Catheter to gravity drainage. Flush catheter 30 cc normal saline every  shift to maintain patency. Chart daily outputs. Contact IR when net  output less than 10 cc in 24 hrs.     JEFF, CHELSI SHERIFF MD, attest that I was present for all critical  portions of the procedure  and was immediately available to provide  guidance and assistance during the remainder of the procedure.    I have personally reviewed the examination and initial interpretation  and I agree with the findings.    CHELSI SHERIFF MD          SYSTEM ID:  [id number]   XR Chest Port 1 View    Narrative    XR CHEST PORT 1 VIEW  6/30/2021 4:35 AM      HISTORY: eval for worsening hypoxemia    COMPARISON: 6/29/2021    FINDINGS:   Single AP view of the chest. Tunneled right IJ central line tip  overlying the high right atrium. Left upper extremity PICC tip  overlying the low SVC. Trachea is midline. Cardiac silhouette is  similar in size. No pneumothorax. Decreased probable trace bilateral  pleural effusions. Large unchanged mild diffuse interstitial and  airspace opacities. Mildly increased left basilar streaky opacities.  Nonspecific focal subglottic tracheal narrowing.      Impression    IMPRESSION:   1. Stable support devices.  2. Cardiomegaly with largely unchanged mild diffuse interstitial and  airspace opacities.  3. Decreased probable trace bilateral pleural effusions with mildly  increased left basilar atelectasis.  4. Nonspecific subglottic focal tracheal narrowing.    I have personally reviewed the examination and initial interpretation  and I agree with the findings.    MIGUEL WALKER MD   XR Abdomen Port 1 View    Narrative    Exam: XR ABDOMEN PORT 1 VIEWS, 6/30/2021 11:52 AM    Indication: Post-NJ tube placement.    Comparison: CT abdomen and pelvis 6/29/2021. Abdominal radiograph  10/6/2020.    Findings:   Portable AP supine abdominal radiograph. Feeding tube tip towards DJ  flexure. Catheter overlying the left midabdomen consistent with  percutaneous drainage catheter placed 6/30/2021. No air-filled  distended loops of bowel. Minor gas in the stomach. Osseous structures  within normal limits. Partially visualized catheter in the thorax.  Please see same day chest radiograph.      Impression    Impression:  Feeding tube tip in the distal duodenum towards the DJ  flexure.    MIGUEL WALKER MD          SYSTEM ID:  [id number]   XR Chest Port 1 View    Narrative    Chest radiograph  6/30/2021 11:53 PM      HISTORY: Evaluation of transient hypoxemia    COMPARISON: 6/30/2021    FINDINGS:   Single AP view of the chest. Right IJ central line tip overlying the  superior cavoatrial junction. Left upper extremity PICC tip overlying  the low SVC. Trachea is midline. Cardiac silhouette is similar in  size. No pneumothorax. Increased small left and trace right pleural  effusions. Increased perihilar and bibasilar interstitial and airspace  opacities, right greater than left.      Impression    IMPRESSION:   1. Stable support devices.  2. Cardiomegaly with increased perihilar and bibasilar interstitial  and airspace opacities, right greater than left.  3. Increased small left and trace right pleural effusions.    I have personally reviewed the examination and initial interpretation  and I agree with the findings.    CHELLE STARKS MD          SYSTEM ID:  [id number]   POC US Guide for Paracentesis    Impression    Limited abdominal ultrasound was performed and demonstrated an adequate fluid collection on the left side of the abdomen. Doppler of the skin demonstrated an area at this site without significant vasculature. A paracentesis at this site was subsequently performed.    Gaurav Russell MD     XR Abdomen Port 1 View    Narrative    Exam: XR ABDOMEN PORT 1 VIEWS, 7/4/2021 8:36 AM    Indication: R/o Obstruction    Comparison: CT dated 6/29/2021. Abdominal radiograph dated 6/30/2021.    Findings:   Supine view of the abdomen is obtained. Abdominal drain projects over  the left hemiabdomen. A few, air-filled nondilated loops of small  bowel measuring up to 2.7 cm over the mid low abdomen is similar to  prior radiograph and CT. Paucity of bowel gas throughout the large  bowel and upper abdomen similar to prior and  corresponding to ascites  and peripancreatic fluid collections seen on comparison CT. No  definite pneumatosis or portal venous gas. No gross osseous  abnormality.      Impression    Impression: Nonobstructive bowel gas pattern.    I have personally reviewed the examination and initial interpretation  and I agree with the findings.    SKYLAR KAPADIA DO          SYSTEM ID:  [id number]   XR Abdomen Port 1 View    Narrative    Exam: XR ABDOMEN PORT 1 VIEWS, 7/4/2021 11:33 AM    Indication: NGT placement    Comparison: Same day abdominal radiograph performed proximally 4 hours  earlier    Findings:   Portable supine view of the abdomen is obtained. Enteric tube distal  tip and sidehole project over the stomach. Redemonstration of mildly  dilated loops of small bowel with one loop over the lower right  abdomen now measuring up to 4 cm. Paucity of bowel gas throughout the  remainder of the abdomen. No definite pneumatosis. Abdominal drain  projects over the left hemiabdomen. No gross osseous abnormality.      Impression    Impression:   1. Enteric tube tip and sidehole project over the stomach.  2. Mildly increased prominence of nonspecific gas-filled loops of  small bowel in the right lower abdomen.    I have personally reviewed the examination and initial interpretation  and I agree with the findings.    SKYLAR KAPADIA DO          SYSTEM ID:  [id number]   CT Abdomen Pelvis w/o Contrast    Narrative    EXAMINATION: CT ABDOMEN PELVIS W/O CONTRAST, 7/4/2021 1:16 PM    TECHNIQUE:  Helical CT images from the lung bases through the pubic  symphysis were obtained without IV contrast.     COMPARISON: 6/29/2021    HISTORY: Abdominal abscess/infection suspected; Monitoring CT for  patient with ESLD, SBP, and Nec Panc s/p perc drain. GI wants CT to  see the status of necrotizing collection. Has ARF on HD thus want to  avoid contrast. Thanks!    FINDINGS:    Abdomen and pelvis: Status post placement of a left lateral  approach  drainage catheter, decreased encapsulated multifaceted  pancreatic/peripancreatic collection centered at the lesser sac and  extending into the central mesentery and left retroperitoneum,  containing low attenuation fluid, nonliquefied fat, and  hyperattenuating blood products. Minimal change in a separate  intermediate attenuation collection located along the inferior aspect  of the third portion of the duodenum. Evaluation of the pancreatic  parenchyma is limited by lack of intravenous contrast. Minimally  changed loculated ascites in the left upper quadrant, left paracolic  gutter, perihepatic space, right paracolic gutter, and pelvis with  diffuse peritoneal thickening. Hyperattenuating septations are visible  in the pelvic ascites. Mesenteric and omental edema, similar to prior.    Diffusely hypoattenuating hepatic parenchyma. The liver is enlarged,  measuring 22.2 cm in cranial caudal dimension with mild capsular  nodularity. Normal gallbladder. No intra or extrahepatic biliary  dilation. The spleen is enlarged, measuring 15.4 cm in craniocaudal  dimension. Normal adrenal glands. No appreciable focal renal cortical  lesion. No hydronephrosis, hydroureter, or urinary tract stone.  Decompressed bladder. No dilated small or large bowel. Diffuse  edematous wall thickening of the fluid-filled small and large bowel  with a few scattered air-fluid levels. No pneumatosis, portal venous  gas, or free air. Enteric tube tip and sidehole in the stomach. Stable  prominent jan hepatis, mesenteric, and retroperitoneal lymph nodes.  No abdominal aortic aneurysm. Recanalized umbilical vein.    Lung bases:  Small right and trace left pleural effusions with  adjacent compressive atelectasis. Patchy groundglass opacities in the  right middle lobe and partially visualized right upper lobe. Small  pericardial effusion.    Bones and soft tissues: No acute or aggressive osseus abnormality.      Impression    IMPRESSION:    1. Status post placement of a left lateral approach drainage catheter,  substantially decreased but persistent hemorrhagic walled off necrosis  centered in the lesser sac. A smaller collection along the inferior  aspect of the third portion of the duodenum is minimally changed.  2. Minimally changed moderate to large volume loculated ascites.  3. Hepatomegaly and hepatic steatosis with evidence of cirrhosis and  portal hypertension.  4. Diffuse edematous wall thickening of the small and large bowel,  likely reactive. No bowel obstruction.  5. Small right and trace left pleural effusions with adjacent  compressive atelectasis, similar to prior. Small pericardial effusion.  6. Faint patchy ground glass opacities in the right middle lobe and  and right upper lobe, likely infectious/inflammatory.    SKYLAR KAPADIA,           SYSTEM ID:  [id number]   POC US Guide for Paracentesis    Impression    Limited abdominal ultrasound was performed and demonstrated an adequate fluid collection on the left side of the abdomen. Doppler of the skin demonstrated an area at this site without significant vasculature. A paracentesis at this site was subsequently performed.    Gaurav Russell MD     CT Abdomen Pelvis w/o Contrast    Narrative    EXAMINATION: CT ABDOMEN PELVIS W/O CONTRAST, 7/11/2021 11:21 AM    TECHNIQUE:  Helical CT images from the lung bases through the  symphysis pubis were obtained  without IV contrast.     COMPARISON: 7/4/2021    HISTORY: Abdominal abscess/infection suspected; Pancreatitis,  persistent    FINDINGS:  Small bilateral pleural effusions are slightly improved from previous.  Associated basilar atelectasis. Trace pericardial fluid. Partially  visualized central venous catheter in the right atrium.    Enteric tube tip in stomach. Substantial hepatomegaly. Splenomegaly.  Low-density of the liver suggestive of steatosis. There is moderate  volume ascites. Layering hyperdense components in the pelvis  are  present and similar to previous exam. Fluid surrounds the gallbladder  which may be related to ascites. Gallbladder is otherwise unremarkable  without hyperdense stone. Adrenal glands and kidneys have normal  noncontrast appearance.    There is evidence of necrotic pancreatitis with collection in the  splenic hilum extending into the lesser sac involving much of the tail  the pancreas. The portion of the pancreas which remains viable cannot  be discerned without contrast. There is a percutaneous drain  originating from the left posterior approach which is cold within the  collection. The collection measures approximately 15.4 x 7.6 cm. It  contains areas of hemorrhage and areas of gas which are likely related  to the presence of a percutaneous drain. Previously, it measured 17.4  x 6.9 cm in similar dimensions and likely has slightly decreased in  size. Other communicating areas of necrosis extend into the root of  the mesentery. There is fairly extensive mesenteric edema. Vascular  patency cannot be determined. Normal caliber of the bowel.      Impression    IMPRESSION:   1. Sequela of necrotizing pancreatitis with highly decreased size of  walled off necrosis centered in the lesser sac. Percutaneous drain  remains in place.  2. Loculated moderate ascites is unchanged.  3. Hepatomegaly, hepatic steatosis, and evidence of portal  hypertension again seen.  4. Small bilateral pleural effusions are slightly improved.    VIKTOR BRUCE MD         SYSTEM ID:  [id number]   XR Surgery CESIA Fluoro L/T 5 Min w Stills    Narrative    This exam was marked as non-reportable because it will not be read by a   radiologist or a [place] non-radiologist provider.         IR Abscess Tube Change    Narrative    Irma Villareal  [id number]    [id number]    IRMA VILLAREAL  [id number]  1990;  30 years    IR ABSCESS TUBE CHANGE  left RP drain exchange, 24F    -----    PRE-OPERATIVE DIAGNOSIS:  necrotizing  "pancreatitis    POST-OPERATIVE DIAGNOSIS:  Same    PROCEDURE:  Contrast injection and fluoroscopic imaging though  existing drain, drain exchange.       Impression    IMPRESSION:  Completed sinogram through existing drain and  over-the-wire exchange for same. Patient with history of necrotizing  pancreatitis and external drain damage, drain exchange requested.   Completed injection demonstrates pigtail in large cavity. Dark brown  fluid aspirated and sample collected for lab studies ordered.  Completed over-the-wire exchange for 24 [place] Thal-Quick drain.  Repeat sinogram shows drain centered in fluid collection. Resume prior  drain cares and flushes. Zohra.  <2    Sedation medications administered: 1 mg versed, 50 mcg fentanyl  Total sedation time: 30 min     -----    CLINICAL HISTORY:  Per above    PERFORMED BY:  PERLA Garcia PA-C (Interventional  Radiology)    MEDICATIONS:  None    NURSING:  The patient was placed on continuous vital signs monitoring.    CONTRAST AMOUNT (mL):  20    DESCRIPTION:  Completed injection demonstrates pigtail in large  cavity. Dark brown fluid aspirated and sample collected for lab  studies ordered. Completed over-the-wire exchange for 24 [place]  Thal-Quick drain. Repeat sinogram shows drain centered in fluid  collection.    COMPLICATIONS:  No immediate complication;  the patient remained  stable throughout the procedure    ESTIMATED BLOOD LOSS:  Minimal    SPECIMENS:  Per above    -----  \"The physician assistant (PA) who performed this procedure and signed  the above report is licensed to practice in the state of [place]  pursuant to [place] Statute 147A.09.  This includes meeting the Statute and  [place] Board of Medical Practice requirement of a collaborating  physician.\"  -----    SYED BRODERICK PA-C         SYSTEM ID:  [id number]   XR Chest Port 1 View    Narrative    XR CHEST PORT 1 VIEW  7/15/2021 5:17 PM      HISTORY: Occluded PICC line port, ensure " appropriate PICC placement    COMPARISON: 6/30/2021    FINDINGS:   Single AP view of the chest. Right tunneled IJ central line tip  overlying the superior cavoatrial junction. Left upper extremity PICC  tip overlying the low SVC. Trachea is midline. Cardiac silhouette is  similar in size. No pneumothorax or significant pleural effusion.  Decreased perihilar and bibasilar interstitial and airspace opacities.  Partially visualized left upper quadrant abdominal drain.      Impression    IMPRESSION:     1. Left upper extremity PICC tip overlying the low SVC.  2. Cardiomegaly with decreased perihilar and bibasilar interstitial  and airspace opacities.     I have personally reviewed the examination and initial interpretation  and I agree with the findings.    NEHEMIAH SHARMA MD         SYSTEM ID:  [id number]   XR Chest Port 1 View    Narrative    EXAM: XR CHEST PORT 1 VIEW 7/16/2021 10:41 AM      HISTORY: aspiration risk, elevated WBC this morning.    COMPARISON: Chest radiograph 7/15/2021.     TECHNIQUE: Frontal view of the chest.    FINDINGS: Frontal radiograph of the chest. Right tunneled internal  jugular central line with tip projecting over the cavoatrial junction.  Left upper extremity PICC line projecting near the cavoatrial  junction. Trachea is midline. Cardiac silhouette is unchanged.   Pulmonary vasculature is distinct.  Unchanged mixed interstitial and  airspace opacities most prominent in perihilar and bibasilar regions.  No visualized pleural effusion. No appreciable pneumothorax. No acute  osseous abnormality. Partially visualized abdominal drain left upper  quadrant.      Impression    IMPRESSION: Unchanged mixed interstitial and airspace opacities most  prominent in the perihilar and bibasilar regions.  1.  Stable support equipment.    I have personally reviewed the examination and initial interpretation  and I agree with the findings.    BRIAN SANCHEZ MD         SYSTEM ID:  [id number]   CT Abdomen Pelvis w  Contrast    Narrative    EXAMINATION: CT ABDOMEN PELVIS W CONTRAST, 7/18/2021 11:29 AM    TECHNIQUE:  Helical CT images from the lung bases through the  symphysis pubis were obtained with IV contrast. Contrast: 119 cc  [id number]     COMPARISON: 7/11/2021, 6/30/2021    HISTORY: Abdominal abscess/infection suspected    FINDINGS:    LUNG BASES: Normal heart size, trace pericardial effusion. Moderate  right pleural effusion with associated compressive atelectasis. Tiny  left basilar pleural effusion with associated compressive atelectasis.    ABDOMEN/PELVIS: Percutaneous gastrojejunostomy with tip in the  proximal jejunum. Unremarkable hepatic parenchyma. Ascites along the  inferior right hepatic lobe, extending into the pelvis and the left  paracolic gutter. Mild mural hyperemia of the gallbladder with mild  associated gallbladder thickening. Sequela of necrotizing pancreatitis  with associated 15 x 6 cm peripancreatic walled off necrosis with  drain in place. Overall, there is improved mesenteric stranding  surrounding the necrotic collection. There is viable enhancement of  the uncinate process and pancreatic head as well as distal body and  tail. Spleen measures 16 cm. There is extensive reactive appearing  lymphadenopathy throughout the mesentery and retroperitoneum. There is  poor contrast uptake throughout the renal parenchyma. Visualized  ureters are normal. Normal caliber, edematous loops of small bowel.  Predominantly fluid-filled colon. Appendix is well visualized. Major  abdominal vasculature is patent. No concerning pelvic mass.    BONES: No acute or suspicious osseous abnormality.      Impression    IMPRESSION:  1. Continued sequelae of necrotizing pancreatitis with the 15 x 6 cm  peripancreatic walled off necrosis predominantly involving the lesser  sac with drain in place. Enhancement of the residual viable pancreatic  tissue in the uncinate, body, head, and the majority of the tail.  2. Poor  parenchymal enhancement of the kidneys suggesting ATN.  3. Moderate amount of ascites with diffuse peritoneal enhancement and  dependant debris in the pelvis. Peritonitis cannot be ruled out.  4. Right pleural effusion with associated compressive atelectasis.  Trace left pleural effusion.    I have personally reviewed the examination and initial interpretation  and I agree with the findings.    LISA LYNCH MD         SYSTEM ID:  [id number]   XR Chest Port 1 View    Narrative    Portable chest    INDICATION: Coarse breath sounds, increased sputum production    COMPARISON: 7/16/2021    FINDINGS: Increased haziness in the right lower lung field may  indicate pleural effusion recommend follow-up to clearing to exclude  infection. Right IJ catheter tip is at the cavoatrial junction. Left  PICC tip is also at the cavoatrial junction. Heart size appears  normal.      Impression    IMPRESSION: Increased haziness of right lower lung field. Possible  effusion and/or pneumonia versus atelectasis. Recommend follow-up to  clearing.    CHELLE STARKS MD         SYSTEM ID:  [id number]   IR Paracentesis    Narrative    PROCEDURES 7/21/2021:  1. Ultrasound guided paracentesis.    History: Alcoholic hepatitis complicated by SBP and loculated ascites,  necrotizing pancreatitis..    Comparison: CT abdomen and pelvis dated 7/18/2021    Staff: Shital Saldaña MD    Fellow/Resident: ALLISON Hinton DO    Complications: None    PROCEDURE: The patient understood the limitations, alternatives, and  risks of the procedure and requested the procedure be performed. Both  written and oral consent were obtained.    Right lower quadrant was prepped and draped in the usual sterile  fashion. 1% lidocaine without epinephrine was used for local  anesthesia. Under ultrasound guidance, a 5 Brazilian [employer] JÃ¡ Entendi centesis  needle catheter was advanced into the peritoneal space. Image  documenting position was entered into the patient's permanent record.    Manual  aspiration was performed. 60 cc ascites aspirated. Catheter  removed. Sterile dressing applied. No immediate complication.      Impression    IMPRESSION: Ultrasound guided paracentesis. 60 cc ascites aspirated.  60 cc aspirated and sent to the lab as requested.       I, INA PARKS MD, attest that I was present in the procedure room  for the entire procedure.    I have personally reviewed the examination and initial interpretation  and I agree with the findings.    INA PARKS MD         SYSTEM ID:  [id number]   XR Surgery CESIA G/T 5 Min Fluoro w Stills    Narrative    This exam was marked as non-reportable because it will not be read by a   radiologist or a [place] non-radiologist provider.         XR Chest Port 1 View    Narrative    EXAM: XR CHEST PORT 1 VIEW 7/22/2021 8:31 AM      HISTORY: c/o SOB, likely pleural effusion on last CXR.    COMPARISON: Chest radiograph 7/19/2021.     TECHNIQUE: Frontal view of the chest.    FINDINGS: Frontal radiograph of the chest. Right internal jugular  central venous catheter and left PICC with tip projecting near the  cavoatrial junction. Trachea is midline. Cardiac silhouette is  obscured on the right, but appears unchanged.  Pulmonary vasculature  is distinct.  Increase in opacity in the right with silhouetting of  the right heart border and diaphragm. Left lung fields unchanged. No  visualized pleural effusion on the right. No appreciable pneumothorax.  No acute osseous abnormality.       Impression    IMPRESSION:   Increase in right-sided opacity. This could represent pleural  effusion, infection, or edema.    I have personally reviewed the examination and initial interpretation  and I agree with the findings.    BRIAN SANCHEZ MD         SYSTEM ID:  [id number]   XR Chest Port 1 View    Narrative    Portable chest    INDICATION: Increased oxygen need, possible effusion    COMPARISON: 7/22/2021    FINDINGS: Increasing extent of right pleural effusion noted. Right  heart  border is obscured by the effusion and associated atelectasis.  Right IJ catheter tip is in the distal SVC. Left PICC tip also in the  SVC.      Impression    IMPRESSION: Increasing right pleural effusion and associated  atelectasis.    CHELLE STARKS MD         SYSTEM ID:  [id number]   CT Abdomen Pelvis w/o Contrast     Value    Radiologist flags Malpositioned GJ tube, cystogastrostomy stent and (AA)    Narrative    EXAMINATION: CT ABDOMEN PELVIS W/O CONTRAST, 7/27/2021 11:56 PM    TECHNIQUE:  Helical CT images from the lung bases through the  symphysis pubis were obtained without IV contrast.     COMPARISON: 7/19/2021, 7/11/2021, 6/29/2021    HISTORY: Abdominal abscess/infection suspected; Known nec/ panc, s/p  drain placement, imaging in anticipation of endoscopic debridement    FINDINGS: Limited exam secondary to noncontrast technique.    LUNG BASES: CVC tip at the low SVC. Normal heart size, no pericardial  effusion. Large right pleural effusion and trace left pleural  effusion. There is complete collapse of the right lateral with fluid  density within the right main bronchus suggestive of mucous impaction.    ABDOMEN/PELVIS: Cystogastrostomy in place with the pancreatic drain  along the anterior edge of the collection, probably partially external  to the walled off pancreatic necrotic collection. Gastrojejunostomy  terminating in the mid jejunum. The gastrostomy tube balloon is  positioned more anteriorly compared to prior and appears to be outside  the gastric lumen. Percutaneous drain coiled within the pancreatic  necrotic collection. The area wall osteonecrosis currently measures  approximately 14 x 6.3 cm, slightly reduced compared to 7/19/2021,  which when it measured 15 x 7 cm. Enlarged liver measuring 17.6 cm  craniocaudally with associated splenomegaly suggestive of portal  hypertension. Mild gallbladder wall thickening. No radiodense  gallstones. Splenomegaly. Adrenals and kidneys are  unremarkable.  Decompressed small bowel and colon. Moderate volume ascites, primarily  in the pelvis. Innumerable reactive appearing mesenteric and  retroperitoneal lymph nodes. No suspicious pelvic mass.    BONES: No acute or suspicious osseous abnormality.      Impression    IMPRESSION:  1. Ongoing evolution of necrotizing pancreatitis with slight decrease  in size of walled off pancreatic necrosis.  2. Cystogastrostomy in place. However, the pancreatic stent does not  appear completely within the walled off pancreatic necrotic collection  and is at least partially in the adjacent mesentery. The gastrostomy  balloon also appears to be outside the gastric lumen bulging into the  subcutaneous plane. Consider GI consultation.  3. Moderate-large volume ascites with small volume dependent debris in  the pelvis. Mild peritoneal thickening. Ongoing peritonitis cannot be  excluded.  4. Complete atelectasis of the right lung with intrabronchial fluid  density suggesting a mucous plug. Large right pleural effusion.  5. Hepatosplenomegaly with dilated paraumbilical vein suggestive of  portal hypertension.        [Critical Result: Malpositioned GJ tube, cystogastrostomy stent and  right lung collapse]    Finding was identified on 7/28/2021 12:01 AM.     Dr. Caro([facility] 3)was contacted by Dr. Jasvir MD at 7/28/2021 9:19  AM and verbalized understanding of the critical finding.     I have personally reviewed the examination and initial interpretation  and I agree with the findings.    LISA LYNCH MD         SYSTEM ID:  [id number]   POC US Guide for Thorcentesis    Impression    POCUS Lung    7/28/2021      Findings:  Right Sided Large Effusion Pleural      Normal lung sliding at R Lung apex without lung point post procedure.    Gaurav Tabares D.O.  Internal Medicine, Hospitalist  [facility]  Pager: [phone]           XR Chest Port 1 View    Narrative    Portable chest    INDICATION: Status post right  thoracentesis    COMPARISON: 7/24/2021    FINDINGS: Decreased right pleural effusion. No definitive  pneumothorax. Right IJ catheter tip the distal SVC. Left PICC tip at  the distal SVC. Right heart border partially obscured but the heart  does not appear grossly enlarged.    CHELLE STARKS MD         SYSTEM ID:  [id number]   XR Surgery CESIA Fluoro L/T 5 Min w Stills    Narrative    This exam was marked as non-reportable because it will not be read by a   radiologist or a [place] non-radiologist provider.         XR Chest Port 1 View    Narrative    Exam: XR CHEST PORT 1 VIEW, 7/30/2021 9:38 AM    Comparison: Chest x-ray 7/28/2021    History: Sepsis work up\    Findings:  Single portable upright view of the chest obtained. Right internal  jugular tunneled central venous catheter in place, tip terminates at  the superior right atrium. Left upper extremity PICC tip terminates in  the superior cavoatrial junction.    Trachea is midline. Mediastinum is within normal limits.  Cardiopulmonary silhouette is within normal limits. Continued  right-sided pleural effusion and associated atelectasis. Slightly  increased right opacity may indicate infection/worsening atelectasis.  No pneumothorax. The upper abdomen is unremarkable.      Impression    Impression:   1. Continued right-sided pleural effusion and associated atelectasis.  2. Slightly increased right lung field opacities which may represent  infection versus worsening atelectasis.    I have personally reviewed the examination and initial interpretation  and I agree with the findings.    BRIAN SANCHEZ MD         SYSTEM ID:  [id number]   CT Abdomen Pelvis w Contrast    Narrative    EXAMINATION: CT ABDOMEN PELVIS W CONTRAST, 7/30/2021 8:58 AM    TECHNIQUE:  Helical CT images from the lung bases through the  symphysis pubis were obtained with IV contrast. Contrast dose:  iopamidol (ISOVUE-370) solution 100 mL    COMPARISON: 7/27/2021, 7/18/2021    HISTORY:  Pancreatitis, necrotizing    FINDINGS:    Abdomen and pelvis:     Ongoing sequela of necrotic pancreatitis. Slightly decreased size of  the now 13.2 x 4.4 cm anterior peripancreatic collection which  previously measured 14.7 x 5.7 cm when measured similarly. This fluid  extends along the anterior portion of the pancreas as well as towards  the splenic hilum bordering the anterior perirenal fascia. Left  lateral approach pigtail surgical drain remains in place.  Repositioning of the cystogastrostomy stent now with double pigtail  catheter coursing through and well-positioned within the lateral  aspect of the collection. There is a smaller pocket of fluid not  definitely connected with this collection just anterior to the aorta  seen on series 5 image 20 and 66 measuring 4.5 x 4.1 cm, previously  4.7 x 3.6 cm. Fluid extends along the lateral aspects of the abdomen  adjacent to the liver and spleen and into the pelvis similar to prior.  Continued heterogeneous enhancement of the pancreatic head and neck.    Unchanged tiny subcentimeter hypodensities in the liver too small to  characterize. No suspicious hepatic lesions. Gallbladder is  nondistended with reactive appearing pericholecystic fluid. Mild  splenomegaly measuring 13.8 cm similar to prior. Enlarged, edematous,  and hypodense kidney similar to prior. No hydronephrosis. No stones  along the expected course of the ureters. Bladder is incompletely  distended.    Bowel is nondilated. Colon is largely filled with liquid stool with  reactive pericolonic inflammation. Prominent central mesenteric lymph  nodes similar to prior and presumably reactive. Scattered ascites.  Percutaneous gastrojejunostomy has been repositioned with now  appropriate intraluminal position of the balloon.    No abdominal aortic aneurysm. Main portal vein and the portal  confluence appears grossly patent.    Bones: Mild degenerative changes. No suspicious osseous lesions. No  evidence of  avascular necrosis.    Lung bases: Improved aeration of the right lung still with small right  pleural effusion and adjacent atelectasis. There are now mixed  groundglass and consolidative opacities in the inferior right middle  lobe, possibly sequela of reexpansion. Partially visualized catheter  is in the right atrium.      Impression    IMPRESSION:   1. Ongoing sequela of necrotizing pancreatitis with slightly decreased  size of the peripancreatic necrotic collection status post  cystogastrostomy stent repositioning. Left lateral approach drain  remains in place  2. Adequate repositioning of the gastrojejunostomy with balloon in the  stomach lumen.  3. Continued appearance of ATN in the kidneys. No hydronephrosis.  4. Improved aeration of the right lung. Small right pleural effusion  with adjacent atelectasis. Mixed groundglass opacities in the right  middle lobe possibly sequela of reexpansion, although infection is not  excluded.  5. Slight interval improvement in the ascites although it still  remains quite large.    I have personally reviewed the examination and initial interpretation  and I agree with the findings.    SANDY STEPHEN MD         SYSTEM ID:  [id number]   POC US Guide for Paracentesis    Impression    POCUS Abdomen    Indication: Evaluate for ascites for safe site for paracentesis    Findings:  LLQ: Small volume pelvic ascites    Impression: LLQ fluid pocket is a safe site for paracentesis.      XR Chest Port 1 View    Narrative    EXAM: XR CHEST PORT 1 VIEW  8/2/2021 11:25 AM     HISTORY:  Increased O2 needs, history of hydrothorax       COMPARISON:  Chest radiograph 7/30/2021    FINDINGS:   Portable AP supine view of the chest. Right IJ catheter with distal  tip projecting over superior atrial caval junction. Left upper  extremity PICC tip terminates in superior atriocaval junction. Trachea  is midline. Increasing large right-sided pleural effusion and  associated atelectasis. Stable appearance  of right-sided pulmonary  opacities. Right heart border is not well visualized. Left lung field  is clear. No identifiable pneumothorax is seen. Bones are grossly  intact.      Impression    IMPRESSION:   1. Increasing large right-sided pleural effusion and associated  atelectasis.  2. Stable appearance of right lung opacities likely consistent with  infection versus atelectasis.    I have personally reviewed the examination and initial interpretation  and I agree with the findings.    CHELLE STARKS MD         SYSTEM ID:  [id number]   XR Abdomen Port 1 View    Narrative    Exam: XR ABDOMEN PORT 1 VIEWS, 8/4/2021 2:55 PM    Indication: concern for ileus    Comparison: CT: 7/30/2021, chest x-ray: 8/2/2021    Findings:   Portable radiograph of the abdomen. Cystgastrostomy stent, unchanged.  Left lateral approach drain, unchanged. Unchanged gastrojejunostomy  tube. Nonobstructive bowel gas pattern. No pneumatosis or portal  venous gas. Kidneys appear enlarged and edematous, better  characterized on recent CT. No aggressive osseous lesions. Right  pleural effusion.       Impression    Impression:  1. Nonobstructive bowel gas pattern.  2. Enlarged and edematous appearance of the kidneys, better  characterized on recent CT.    I have personally reviewed the examination and initial interpretation  and I agree with the findings.    IVKTOR BRUCE MD         SYSTEM ID:  [id number]   CT Abdomen Pelvis w Contrast    Narrative    EXAMINATION: CT ABDOMEN PELVIS W CONTRAST, 8/5/2021 4:32 PM    TECHNIQUE:  Helical CT images from the lung bases through the  symphysis pubis were obtained with IV contrast. Contrast dose:  iopamidol (ISOVUE-370) solution 91 mL    COMPARISON: 7/30/2021, 7/27/2021    HISTORY: Abdominal abscess/infection suspected    FINDINGS:    LUNG BASES: Normal heart size, no pericardial effusion. Large right  pleural effusion unchanged with associated compressive atelectasis. No  pneumothorax. Previous right  middle lobe consolidative opacities from  7/30/2021 have resolved.    ABDOMEN/PELVIS: Cystogastrostomy in place. Large bore drain within the  peripancreatic walled off necrosis. Percutaneous jejunostomy  terminating in the mid jejunum. Small volumes of ascites. Unremarkable  hepatic parenchyma with punctate cysts unchanged. Gallbladder is  unremarkable. Essentially stable size and appearance of the walled off  peripancreatic fluid collections, largest involving the pancreatic  body and tail which measures 11 x 4.2 cm. Additional multiloculated  fluid collection adjacent to the pancreatic head measures  approximately 4.3 x 0.5 cm, also stable. Stable 3.1 x 4.0 cm  perilesional fluid collection (series 5, image 243). Slight decrease  in size of rim hyperdense right paracolic gutter fluid collection.  Small amount of residual viable pancreatic tissue involving the  uncinate process, head, and body. Edematous renal parenchyma for  contrast enhancement. Fluid-filled small bowel. Mild mural enhancement  of the colon, which is decompressed. Appendix is not visualized. Major  abdominal vasculature is patent. Multiple reactive appearing  mesenteric lymph nodes. No concerning pelvic mass.    BONES: No acute osseous or abdominal abnormality.      Impression    IMPRESSION:  1. Stable sequelae of necrotizing pancreatitis with cystogastrostomy  stent, percutaneous drain, and multiple necrotic peripancreatic fluid  collections as described above.  2. Stable small volume of abdominopelvic ascites. Rim hyperdense right  paracolic gutter fluid appears slightly decreased.  3. Large right pleural effusion with associated compressive  atelectasis.  4. Continued poor renal parenchymal enhancement, suggesting acute  tubular necrosis..    I have personally reviewed the examination and initial interpretation  and I agree with the findings.    CHELLE STARKS MD         SYSTEM ID:  [id number]   XR Surgery CESIA Fluoro L/T 5 Min w Stills     Narrative    This exam was marked as non-reportable because it will not be read by a   radiologist or a [place] non-radiologist provider.         XR Chest Port 1 View    Narrative    EXAM: XR CHEST PORT 1 VIEW  8/8/2021 4:48 AM     HISTORY:  30M new cough, stable oxygen requirement, 8/2 imaging with  possible infection, on antibiotics       COMPARISON:  Chest x-ray 8/2/2021    FINDINGS: AP radiograph of the chest. Left arm PICC with tip overlying  the low SVC. Right IJ dual-lumen central venous catheter tip in  overlying the lower SVC.    Stable cardiomegaly mediastinal silhouette. Slightly decreased  moderate layering right pleural effusion. Small left pleural effusion.  No pneumothorax. Decreased mixed interstitial and patchy airspace  opacities. Partial visualization of surgical drain in the left upper  quadrant. The visualized upper abdomen is otherwise unremarkable.      Impression    IMPRESSION:  1. Decreased mixed interstitial and airspace opacities and bilateral  pleural effusions, right greater than left.  2. Support devices are unchanged.    I have personally reviewed the examination and initial interpretation  and I agree with the findings.    CHELLE STARKS MD         SYSTEM ID:  [id number]   CT Chest Pulmonary Embolism w Contrast    Narrative    Chest CT pulmonary angiogram with contrast    INDICATION: Pulmonary embolus suspected, high probability    COMPARISON: No prior chest CT scans.    Contrast: 57 mL intravenous Isovue-370    FINDINGS: Extensive opacification of the right hemithorax with large  pleural effusion and associated atelectasis. No left lung nodule. No  groundglass opacities.  The included thyroid appears without discrete nodule. There are  nonenlarged axillary and mediastinal lymph nodes. Upper limits normal  sized right pretracheal node (series 9 image 102), for example,  measures 1.3 x 0.7 cm. Multiple nonenlarged aortopulmonary window  lymph nodes are present. Opacification of the  pulmonary artery tree is  somewhat suboptimal, however, there is a suitable hypodense filling  defect suggested in the left upper lobe branches (series 9 image 94)  with contrast mixing versus subtle vague hypodensity in the left lower  lobar pulmonary artery (series 9 image 110). No evidence of right  heart enlargement. No saddle embolus. Streak artifact from the single  right-sided SVC. Catheter tip at the cavoatrial junction. Main  pulmonary artery normal in size.  Upper abdomen shows tubing within the stomach from an anterior  percutaneous approach which may represent a GJ tube which is  incompletely imaged. Incompletely imaged rounded fluid density noted  in the left upper abdomen anteriorly (series 9 image 320) which is  grossly unchanged from recent CT cyst with abscess. Haziness adjacent  to the pancreas from necrotizing pancreatitis again is partially  imaged.  Bones appear well mineralized.      Impression    IMPRESSION: Suboptimal opacification of pulmonary tree but there is  concern for possible nonobstructing embolus in a left upper lobe  pulmonary artery branch. No evidence of right heart strain. Large  right pleural effusion with associated atelectasis. Multiple fluid  collections in the abdomen which may represent abscesses from no  necrotizing pancreatitis. Multiple nonenlarged mediastinal lymph  nodes, possibly reactive. No pulmonary nodule.    CHELLE STARKS MD         SYSTEM ID:  [id number]   XR Video Swallow with SLP or OT    Narrative    Examination:  Modified Barium Swallow Study with Speech Pathology,  8/10/2021 10:43 AM.    Comparison: None    History: Possible aspiration event, per SLP    Technique: Under fluoroscopic guidance, the patient was given orally  administered barium of varying consistencies in the presence of the  speech pathology service.     Findings:   The patient was evaluated on thin and pudding consistencies. The oral  phase was normal. There is no delay in swallowing or  pooling of  contrast. There is no laryngeal penetration or tracheal aspiration  with any of the administered consistency of barium including thin  barium and pudding consistencies. No significant residue within the  valleculae or piriform sinus.      Fluoroscopy time: 0.5 minutes        Impression    Impression:   1. No laryngeal penetration or tracheal aspiration with thin and  pudding consistencies.   2. Please see the speech pathologist report for additional details.        I have personally reviewed the examination and initial interpretation  and I agree with the findings.    LESLYE LACKEY MD         SYSTEM ID:  [id number]    Upper Extremity Venous Duplex Bilat    Narrative    EXAMINATION: DOPPLER VENOUS ULTRASOUND OF BILATERAL UPPER EXTREMITIES,  8/9/2021 2:50 PM     COMPARISON: None.    HISTORY: History of PE, assessing extremities for clot burden    TECHNIQUE:  Gray-scale evaluation with compression, spectral flow, and  color Doppler assessment of the deep venous system of both upper  extremities.    FINDINGS:  Normal blood flow and waveforms are demonstrated in the internal  jugular, innominate, subclavian, and axillary veins bilaterally. There  is normal compressibility of the brachial, basilic and cephalic veins  bilaterally.      Impression    IMPRESSION:  1.  No evidence of deep venous thrombosis in either upper extremity.    I have personally reviewed the examination and initial interpretation  and I agree with the findings.    MIGUEL WALKER MD         SYSTEM ID:  [id number]    Lower Extremity Venous Duplex Bilateral    Narrative    EXAMINATION: DOPPLER VENOUS ULTRASOUND OF BILATERAL LOWER EXTREMITIES,  8/9/2021 2:51 PM     COMPARISON: None.    HISTORY: History of PE, assessing extremities for clot burden    TECHNIQUE:  Gray-scale evaluation with compression, spectral flow and  color Doppler assessment of the deep venous system of both legs from  groin to knee, and then at the  ankles.    FINDINGS:  In both lower extremities, the common femoral, femoral, popliteal and  posterior tibial veins demonstrate normal compressibility and blood  flow.      Impression    IMPRESSION:  1.  No evidence of deep venous thrombosis in either lower extremity.    I have personally reviewed the examination and initial interpretation  and I agree with the findings.    MIGUEL WALKER MD         SYSTEM ID:  [id number]   POC US GUIDE FOR THORACENTESIS    Impression    Rt thorax with pleural effusion prior to thoracentesis (us guided)   XR Chest Port 1 View    Narrative    Portable chest    INDICATION: Right thoracentesis    COMPARISON: 7/22/2021    FINDINGS: Interval decrease in the right pleural effusion. Right IJ  large bore catheter tip is in the proximal right atrium. Left PICC tip  is at the cavoatrial junction. No obvious pneumothorax. Heart size  normal.      Impression    IMPRESSION: Decrease of right pleural effusion postthoracentesis. No  evidence of pneumothorax radiographically.    CHELLE STARKS MD         SYSTEM ID:  [id number]   CTA Chest Abdomen Pelvis w Contrast    Narrative    CTA chest abdomen pelvis with contrast 8/10/2021 1:16 PM    CLINICAL HISTORY: PE suspected, high prob.     COMPARISONS: 8/8/2021, 8/5/2021    REFERRING PROVIDER: ERICA HAZEL    TECHNIQUE: Volumetric helical acquisition of CT images of the from the  lung apices through the pelvis were acquired after the administration  of intravenous contrast. Images obtained in the pulmonary arterial  phase of the chest, and systemic arterial phase of the chest, abdomen,  pelvis. Post-processed multiplanar and/or MIP reformations were  obtained, archived to PACS and used in interpretation of this study.     CONTRAST: 100 mL Isovue 370    DOSE (DLP): 453 mGy*cm    FINDINGS:     Support devices: Right internal jugular approach dual-lumen central  venous catheter tip is in the high right atrium. Left arm PICC  terminates in  the high right atrium. Percutaneous gastrojejunostomy  tube terminates in the jejunum. Left lateral approach abdominal drain  unchanged in position within the left upper quadrant collection.  Stable appearance of cystogastrostomy tube.    Chest:  Contrast bolus is good. No acute pulmonary embolism. Aorta and its  major thoracic branches are normal.    Partially visualized thyroid gland is unremarkable. Stable prominent  axillary, mediastinal, and hilar lymph nodes, likely reactive. Heart  is not enlarged. No pericardial effusion. Major thoracic vessels are  normal in caliber and configuration. Esophagus is normal.    Trachea and central airways are clear debris. Left lung is relatively  clear. Decreased large right pleural effusion with improved aeration  of the right upper lobe. Continued complete atelectasis of the right  lower lobe and portions of the right middle lobe. Patchy groundglass  opacities in the right upper lobe and right middle lobe, previously  atelectatic on 8/8/2021.    Abdomen/pelvis:  Major abdominal arteries are patent and normal in caliber.    Sequela of necrotizing pancreatitis with lobulated walled off necrosis  in the central abdomen adjacent to the pancreatic tail measuring  approximately 11.1 x 2.6 cm, previously measuring 11.0 x 4.2 cm, with  decreased foci of air throughout the collection. Unchanged position of  a large bore drain within the collection via left lateral abdominal  wall and cystogastrostomy tubes. Stable diffuse hazy mesenteric edema  and small to moderate volume ascites, predominantly layering in the  pelvis, around the liver, and in the paracolic gutters.    Liver measures up to 23.2 cm in craniocaudal dimension, similar to  prior exam. No focal hepatic lesion. Gallbladder, bile ducts, and  adrenal glands are unremarkable. Spleen measures up to 14.7 cm. No  significant renal cortical enhancement in the arterial phase. Ureters  and urinary bladder are unremarkable, with  contrast in the bladder  from prior contrast enhanced exam. No dilated bowel. Retained barium  throughout the small bowel from earlier swallow study. Prominent  mesenteric, retroperitoneal, and jan hepatis lymph nodes, similar to  prior exam and likely reactive.    Bones and soft tissues:  Anasarca. Transitional lumbosacral vertebra with lumbarization of S1.  No acute or suspicious osseous lesion.      Impression    Impression:  1. No acute pulmonary embolus.  2. Aorta and major arteries are patent and normal in caliber.  3. Decreased large right pleural effusion with continued complete  atelectasis of the right lower lobe but improved aeration of the right  upper lobe and right middle lobe since 8/8/2021.  4. Patchy opacities in the right upper lobe and right middle lobe,  sequela of reexpansion versus superimposed infection.  5. Decreased size of walled off necrotic collections in the upper  abdomen with unchanged position of drains.  6. Stable hepatosplenomegaly.  7. No significant renal cortical enhancement in the arterial phase,  consistent with known kidney injury.  8. Stable mesenteric edema, ascites, and anasarca.    I have personally reviewed the examination and initial interpretation  and I agree with the findings.    [facility]         SYSTEM ID:  [id number]   XR Surgery CESIA L/T 5 Min Fluoro w Stills    Narrative    This exam was marked as non-reportable because it will not be read by a   radiologist or a [place] non-radiologist provider.         CT Chest w/o Contrast    Narrative    EXAMINATION: CT CHEST W/O CONTRAST, 8/13/2021 12:40 PM    CLINICAL HISTORY: Pancreatitis, right hepatic hydrothorax.    COMPARISON: CT chest 8/10/2021.    TECHNIQUE: CT imaging obtained through the chest without contrast.  Coronal and axial MIP reformatted images obtained.     CONTRAST:  none.    FINDINGS:    Lines and tubes: Right IJV central venous catheter terminates in the  SVC. Left subclavian PICC terminates in  the SVC. Partially visualized  gastrojejunostomy tube, a cystogastrostomy and left sided percutaneous  drain terminating within the peripancreatic collection.    Mediastinum: No thyroid nodules. Central tracheobronchial tree is  patent. Heart size is normal. No pericardial effusion.  Normal  thoracic vasculature. No thoracic lymphadenopathy. Subcentimeter  mediastinal lymph nodes are probably reactive.    Lungs: The large right pleural effusion has increased compared to  prior study and there is increased atelectasis/consolidation of the  right upper lobe and persistent complete atelectasis of the right  lower lobe. A few groundglass opacities are again noted in the right  middle and upper lobes. No pneumothorax.    Bones and soft tissues: No suspicious bone findings.     Upper abdomen: Partially visualized peripancreatic necrotic  collection.      Impression    IMPRESSION:   1. Compared to 8/10/2021, the large right pleural effusion has  increased and there is increased atelectasis/consolidation of the  right upper lobe and unchanged complete atelectasis of the right lower  lobe. Left lung is clear.  2. Partially visualized peripancreatic collection. Please refer  concurrently performed abdominal CT report for pertinent findings.    LISA LYNCH MD         SYSTEM ID:  [id number]   CT Abdomen Pelvis w/o & w Contrast    Narrative    EXAMINATION: CT ABDOMEN PELVIS W/O & W CONTRAST, 8/13/2021 12:46  PM    TECHNIQUE:  Helical CT images from the lung bases through the  symphysis pubis were obtained with IV contrast. Contrast dose:  iopamidol (ISOVUE-370) solution 91 mL      COMPARISON: Abdomen and pelvis CT 8/5/2021, 7/30/2021    HISTORY: Hypotension    FINDINGS:    Lung bases: Partially visualized large right pleural effusion. Stable  6 x 4 mm subpleural nodule in the anterior left lower lobe.    Liver: There is a new irregular area of hypoattenuation in hepatic  segment 2 measuring up to 3.7 cm (series 7 image 115).  Tiny focal  hypoattenuation in the superior aspect of segment 2 likely represents  a simple cyst.. Slightly increased small volume ascites.  Biliary system: There is pericholecystic fluid, stable and likely  reactive. No gallstones. Mild prominence of the common bile duct is  similar compared to prior. No intrahepatic biliary dilation.  Pancreas: Left lateral approach large-bore drain within a  peripancreatic fluid collection, which measures 8.5 x 1.9 cm. This is  decreased compared to prior. Additional fluid collection by the  pancreatic head measures 4.0 x 2.9 cm is stable. Stable small fluid  collection posterior to the pancreatic head. There is also a fluid  collection just inferior to the pancreas measuring up to 4 cm, also  stable. Cystogastrostomy tube in place.  Spleen: Splenomegaly, measuring up to 15.5 cm.  Adrenal glands: Within normal limits.  Kidneys: Bilateral edematous renal parenchyma with delayed nephrograms  bilaterally.  Bladder: Contrast within the bladder from previous contrasted exam,  otherwise unremarkable.  Reproductive organs: Within normal limits.  GI: Colonic diverticulosis without evidence of diverticulitis.  Percutaneous gastrojejunostomy tube in place with tip terminating  within the jejunum. Fluid-filled small bowel. Similar appearance and  size of loculated fluid collection along the right paracolic gutter  measuring up to 6 cm.  Lymph nodes: Multiple stable, reactive mesenteric lymph nodes.  Vasculature: Vasculature is grossly patent.    Bones and soft tissues: Mild soft tissue edema over the bilateral  flanks/lateral aspect of the abdomen.. No suspicious osseous lesion.      Impression    IMPRESSION:   1. Sequela of necrotizing pancreatitis with cystogastrostomy,  percutaneous drain, and multiple peripancreatic fluid collections, of  which the largest is slightly decreased.  2. There is a new 4 cm area of wedge-shaped hypoattenuation in hepatic  segment 2, which is concerning for  possible abscess or altered  perfusion/infarct.  3. Grossly stable large right pleural effusion.  4. Slight increase in small volume ascites and stable loculated right  paracolic gutter fluid collection.  5. Continued poor renal enhancement, suggesting acute renal injury.  6. Splenomegaly.    I have personally reviewed the examination and initial interpretation  and I agree with the findings.    NEHEMIAH SHARMA MD         SYSTEM ID:  [id number]   XR Chest Port 1 View    Narrative    EXAM: XR CHEST PORT 1 VIEW  8/14/2021 6:09 PM      HISTORY: New cough, increased tachypnea    COMPARISON: Chest x-ray 6/19/2021, chest CT 8/13/2021    FINDINGS: Portable semiupright AP radiograph of the chest. Dual-lumen  right IJ central venous catheter tip projects over the mid SVC.  Partially visualized tubing catheter in the left upper quadrant. Left  upper extremity PICC tip projects over the mid SVC. The trachea is  midline. The cardiac silhouette is partially obscured. Large right  pleural effusion with very little aeration of the right lung. The left  lung is relatively clear. No aggressive osseous lesion      Impression    IMPRESSION:     1. Large right pleural effusion with near total right lung atelectasis  and small residual aerated right lung.  2. Left lung is clear.    I have personally reviewed the examination and initial interpretation  and I agree with the findings.    JOHN OCONNOR MD         SYSTEM ID:  [id number]   CTA Abdomen Pelvis with Contrast    Narrative    Exam: Computed tomographic angiography of the abdomen and pelvis  without and with contrast, including 3D reformations dated 8/21/2021  9:01 AM    Clinical information:  GI bleed    Technique: Helical scans through the abdomen and pelvis obtained  before the administration of intravenous contrast media and following  the injection of contrast media  in the arterial phase. Source images  reviewed as well as 3D and multi-planar reconstructions.    Contrast:  iopamidol (ISOVUE-370) solution 108 mL       DLP: 2492 mGy*cm    Comparison study: CT of the abdomen and pelvis dated 8/13/2021, CT of  the chest and pelvis dated 8/10/2021    Findings:    Lines: Gastrojejunostomy tube in place with balloon within the gastric  lumen. Left lateral approach large bore drain within the  peripancreatic fluid collection. Cystogastrostomy tube in place.    Chest: Limited exam of the chest. There is large right pleural  effusion, increased since 8/13/2021 with atelectatic right lower lobe.  Visualized portions of the heart within normal limits. No pericardial  effusion. No consolidative pulmonary opacities of the right lower  lobe.    ABDOMEN:    Liver: The hypoattenuating lesion of segment 2 is once again  visualized measuring 3.0 x 2.7 cm. Lesion demonstrates irregular  peripheral enhancement without filling, suspicious for abscess..  Coarse nodular appearance of the liver. No extrahepatic or  intrahepatic biliary dilatation.    Gallbladder: The gallbladder is distended. No radiopaque gallstones.    Pancreas: Left lateral approach large bore drain within the  peripancreatic fluid collection which now measures 8.2 x 1.8 cm,  stable compared to 8/10/2021. There is an additional fluid collection  adjacent to the pancreatic head which measures 3.0 x 2.0 cm,  previously 4.0 x 2.9 cm. Stable fluid collection inferior to the  pancreas measuring up to 4 cm.    Spleen: Splenomegaly with spleen measuring 15.5 cm.    Adrenals: The adrenal glands are within normal limits.    Kidneys: Bilateral edematous renal parenchyma with patent renal  arteries. No hydronephrosis.    Bladder: Bladder is within normal limits.    Reproductive: Prostate is within normal limits.    GI: Colonic diverticulosis without diverticulitis. No contrast  extravasation to suggest active GI bleeding. GI contents measure  Hounsfield units of 85, without oral contrast, this suggests the  possibility of intraluminal blood.  Stable  ascites.  Vascular:  The abdominal aorta is normal in caliber.    The celiac axis, SMA and JAVID are patent. The renal arteries are patent  bilaterally.    The splenic vein, portal vein, SMV and renal veins are  patent.  Splenic vein is engorged with perisplenic varicosities. No large  esophageal varices. Stable gastric varices. The hepatic veins and IVC  are patent.  Extensive dilated vessels at the GE junction, gastric and  jan hepatis regions.      Impression    Impression:    1. No active contrast extravasation to suggest active GI bleed. If  clinical suspicion for GI bleeding is high, recommend tagged RBC  study.  2. Stable peripancreatic fluid collections, with left lateral approach  large bore drain. Stable position of the cystogastrostomy tube.  3. Interval increase in the right large pleural effusion with  associated atelectasis.  4. The liver segment 2 hypoattenuating hepatic lesion with irregular  arterial enhancement, suspicious for developing abscess  5. Stable loculated ascites  6. Stable edematous kidneys.    I have personally reviewed the examination and initial interpretation  and I agree with the findings.    SANDY STEPHEN MD         SYSTEM ID:  [id number]   CT Abdomen Pelvis w/o Contrast    Narrative    EXAMINATION: CT ABDOMEN PELVIS W CONTRAST, 8/23/2021 10:15 AM    TECHNIQUE:  Helical CT images from the lung bases through the  symphysis pubis were obtained with IV contrast. Contrast dose: Isovue  370 101 mL    COMPARISON: CT dated 8/21/2021, 8/13/2021    HISTORY: Abdominal abscess/infection suspected    Additional history per chart:  Necrotizing alcoholic pancreatitis w/  infected pseudocyst, Secondary Bacterial Peritonitis 2/2 infected  pseudocyst with VRE    FINDINGS:    Lines: Percutaneous gastrojejunostomy tube in place with balloon  within the gastric lumen. Left lateral approach large bore drain  within the peripancreatic fluid collection. Cystogastrostomy tube in  stable  position.    Abdomen and pelvis: Hypoattenuating lesion in segment 2 is  redemonstrated measuring approximately 2 x 2.1 cm in maximal AP and  transverse dimensions, slightly decreased from prior. No new focal  liver lesion. Mild gallbladder wall thickening similar to prior  without evidence of radiopaque gallstones. Mild hepatosplenomegaly  similar to prior. Adrenal glands are unremarkable. Peripancreatic  fluid collection measures approximately 8.6 x 2 cm, not substantially  changed compared to prior and with stable position of large bore drain  and cystogastrostomy tube. Additional fluid collection near the  pancreatic head measures approximately 2.8 x 2.0 cm, not substantially  changed compared to prior. Stable fluid collection inferior to the  pancreas measuring up to 4 cm (series 2, image 58).     Edematous and poorly enhancing renal parenchyma similar to prior. No  evidence of hydronephrosis. Minimally distended bladder is  unremarkable and with a small amount of contrast noted within the  bladder from prior contrast examination on 8/21/2021.     Normal caliber small and large bowel without evidence of obstruction.  Fluid-filled large bowel similar to prior. Moderate volume ascites  which appears loculated with persistent peritoneal enhancement and  thickening, likely reactive. No free intraperitoneal air. No new  intra-abdominal fluid collection or abscess identified.    Multiple reactive mesenteric lymph nodes without significant change.    Abdominal aorta is without focal aneurysm. Origins of the celiac,  superior mesenteric, inferior mesenteric, and renal arteries are  grossly patent. Engorged splenic vein with perisplenic varicosities  similar to prior.    Lung bases: Large right pleural effusion with atelectatic right lower  lobe similar to prior. Left lung base is clear. Visualized base of the  heart is unremarkable and without pericardial effusion.     Bones and soft tissues: No suspicious osseous  lesions identified.  Diffuse anasarca.      Impression    IMPRESSION:   1. Sequela of necrotizing pancreatitis with stable peripancreatic  fluid collections. Unchanged position of left lateral approach large  bore drain and cystogastrostomy tube.  2. Stable large right pleural effusion with associated atelectasis.  3. Decreased size of hypoattenuating lesion of hepatic segment 2.   Attention on follow-up.  4. Stable loculated ascites.  5. Edematous and poorly enhancing kidneys similar to prior.    I have personally reviewed the examination and initial interpretation  and I agree with the findings.    AINSLEY ATKINS MD         SYSTEM ID:  [id number]   IR Sinogram Injection Diagnostic    Narrative    PROCEDURES 8/25/2021:  1. Drainage catheter removal under fluoroscopic guidance.    CLINICAL HISTORY: Retroperitoneal Thal-Quick drainage catheter no  longer needed. Removal requested. Procedure to be performed under  fluoroscopic guidance to ensure the cystogastrostomy drains remain in  place.    COMPARISONS: CT 8/23/2021    STAFF RADIOLOGIST: JENISE Ryder MD    I, SACHI RYDER MD, attest that I was present in the procedure room for  the entire procedure.    MEDICATIONS: No intravenous sedation administered. The patient  remained stable throughout the procedure.    ATTENDING FACE-TO-FACE SEDATION TIME: No intravenous sedation  administered.    FLUOROSCOPY TIME: 44.2 seconds    DOSE: 17.31 mGy.    PROCEDURE: Consent was obtained as part of the original procedure.  Patient place in the right posterior oblique position.    The left flank and existing retroperitoneal drain were prepped and  draped in usual sterile fashion. 1% lidocaine without epinephrine was  used for local anesthesia.    Under direct fluoroscopic guidance, the existing Thal-Quick  retroperitoneal drain was removed over 0.035 Amplatz superstiff  guidewire. Care was taken not to dislodge the existing  cystogastrostomy drains.    Fluoroscopic image documenting  removal of the retroperitoneal drainage  catheter in unchanged position of the cystogastrostomy drains was  saved in the patient's record.    Sterile dressing applied. No immediate complication.      Impression    IMPRESSION:  1. Left retroperitoneal Thal-Quick drainage catheter removed under  fluoroscopic guidance. Cystogastrostomy drains remained unchanged in  position.    2. Post procedure care and observation the patient's inpatient care  unit. Change dressings as needed until the site has healed.    SACHI RYDER MD         SYSTEM ID:  [id number]   XR Chest Port 1 View    Narrative    EXAM: XR CHEST PORT 1 VIEW  9/2/2021 5:24 PM     HISTORY:  low O2 SATS       COMPARISON:  8/14/2021    FINDINGS:   Portable supine radiograph of the chest. Right IJ CVC and left upper  extremity PICC tip project over the low SVC. The trachea is midline.  The cardiac silhouette is partially obscured. Large right pleural  effusion with near total atelectasis of the right lung. No  pneumothorax. The left lung is clear. The upper abdomen is  unremarkable. Osseous structures are unchanged.      Impression    IMPRESSION:   1. Large right pleural effusion with near total right lung  atelectasis.  2. The left lung is clear.    I have personally reviewed the examination and initial interpretation  and I agree with the findings.    BRIAN SANCHEZ MD         SYSTEM ID:  [id number]   XR Abdomen Port 1 View    Narrative    EXAMINATION:  XR ABDOMEN PORT 1 VIEWS 9/2/2021 7:11 PM     INDICATION: Elevated LA and increase in nausea. Please eval for acute  patho    COMPARISON: CT abdomen pelvis 8/23/2021.    FINDINGS: Single AP supine view of the abdomen.     The small intestine and colon are nondistended. Cystogastrostomy tube  over the left upper quadrant. Gastrojejunostomy tube tip projected in  the left lower quadrant. Ascites. No evidence of pneumatosis or portal  venous gas. Limited evaluation of intra-abdominal free air due to  supine position.  Right-sided pleural effusion.      Impression    IMPRESSION:    1. Nonobstructive bowel gas pattern. Stable position of  cystogastrostomy and gastrojejunostomy tube.  2. Moderate right-sided pleural effusion.    I have personally reviewed the examination and initial interpretation  and I agree with the findings.    BRIAN SANCHEZ MD         SYSTEM ID:  [id number]   CT Abdomen Pelvis w Contrast    Narrative    EXAMINATION: CT ABDOMEN PELVIS W CONTRAST  9/2/2021 8:19 PM      HISTORY: Abdominal abscess/infection suspected    COMPARISON: CT abdomen and pelvis 8/23/2021 and 8/21/2021    PROCEDURE COMMENTS: CT of the abdomen and pelvis was performed with  iopamidol (ISOVUE-370) solution 84 mL intravenous contrast. Coronal  and sagittal reformatted images were obtained.    FINDINGS:  Stable percutaneous gastrojejunostomy tube with tip in the proximal  jejunum. Interval removal of left lateral approach abdominal drain.  Unchanged cystogastrostomy tube.     Lower thorax:   Slight decrease in large right pleural effusion with associated  compressive atelectasis. Stable 10 mm subpleural pulmonary nodule in  the lingula. The left lung is clear.    Abdomen and pelvis:  Unchanged hepatosplenomegaly. Decreased hypoattenuating lesion in  segment 2, which measures 2.1 x 1.5 cm, previously measuring 2.1 x 2.0  cm. Scattered subcentimeter cystic hypodensities within the liver,  likely representing simple hepatic cysts. No new focal liver lesions.  Gallbladder is unremarkable. Interval decrease in 6.8 x 0.8 cm  peripancreatic fluid collection with cyst gastrostomy tube in place  and interval removal of left lateral approach abdominal drain,  previously measuring 8.6 x 2.0 cm. Slight decrease in fluid collection  adjacent to the pancreatic head measuring 2.8 x 1.6 cm, previously  measuring 3.1 x 2.0 cm. Decrease fluid collection along the inferior  aspect of the pancreas measuring 3.4 x 2.2 cm, previously measuring  4.1 x 2.7 cm. The  adrenal glands are unremarkable. Edematous and  poorly enhancing bilateral kidneys are similar to appearance from  prior exam. No nephrolithiasis. Urinary bladder is decompressed.  Decreased loculated ascites with persistence peritoneal enhancement  and thickening which is seen within the pelvis and tracks up into the  right pericolic gutter. Largest pocket in the pelvis measuring 6.3 x  5.8 cm, previously measuring 9.6 x 8.1 cm. No abnormally dilated loops  of large and small bowel. No evidence of bowel obstruction. No new  fluid collection. No inguinal lymphadenopathy. Redemonstration of  multiple prominent borderline enlarged mesenteric lymph nodes, likely  reactive in nature. The major intra-abdominal vasculature is widely  patent. Multiple perisplenic varicosities seen in the left upper  abdomen. Mild anasarca.    Bones:  No suspicious or aggressive appearing bone lesions.      Impression    IMPRESSION:  1. Sequela of necrotizing pancreatitis with interval decrease in  multiple peripancreatic fluid collections with interval removal of  left lateral approach large-bore drain and stable positioning of  cystogastrostomy tube. No new intra-abdominal fluid collections.  2. Decreased loculated ascites.  3. Decreased large right pleural effusion with associated compressive  atelectasis.  4. Stable appearance of edematous and poorly enhancing kidneys.  5. Slight decrease in hypoattenuating lesion within segment 2 of the  liver.    I have personally reviewed the examination and initial interpretation  and I agree with the findings.    BRIAN SANCHEZ MD         SYSTEM ID:  [id number]   XR Chest Port 1 View    Narrative    EXAM: XR CHEST PORT 1 VIEW  9/3/2021 6:15 PM     HISTORY:  s/p thora, 1050 cc off.       COMPARISON:  9/2/2021    FINDINGS:   Portable frontal radiograph of the chest. The left costophrenic angle  is partially collimated out of the field-of-view. Right IJ CVC and  left upper extremity PICC tip projected  near the superior cavoatrial  junction. Trachea is midline. The cardiac silhouette size is normal.  Interval decrease small right-sided pleural effusion with atelectasis  in the mid to lower right lung. No pneumothorax. The left lung is  relatively clear. Partially visualized cystogastrostomy tube in left  upper quadrant. The upper abdomen is unremarkable. No acute osseous  abdomen.      Impression    IMPRESSION:   Interval decrease small right-sided pleural effusion with atelectasis  in the mid to lower right lung. The left lung remains clear.    I have personally reviewed the examination and initial interpretation  and I agree with the findings.    VIKTOR BRUCE MD         SYSTEM ID:  [id number]   CT Abdomen Pelvis w Contrast    Narrative    EXAM: CT ABDOMEN PELVIS W CONTRAST, 9/7/2021    TECHNIQUE:  Helical CT images from the lung bases through the  symphysis pubis were obtained after the administration of intravenous  contrast. Coronal and sagittal reformatted images were generated at a  workstation for further assessment. Contrast dose: iopamidol  (ISOVUE-370) solution 80 mL  .    HISTORY: Abdominal abscess/infection suspected    COMPARISON: CT 9/2/2021, 8/23/2021, and 6/29/2021.    FINDINGS:    Lung Bases: Partially visualized large right pleural effusion with  right lower lobe and partial right middle lobe collapse. No apparent  loculation or peripheral enhancement of the collection.    Abdomen/Pelvis: Gastrojejunostomy balloon is atypically superficial,  partially deforming first is extending through the external abdominal  fascia. Gastrojejunostomy tip is in the proximal jejunum. Cyst  gastrostomy tube in unchanged position. Evolving sequelae of  necrotizing pancreatitis with decreased size of multiple  peripancreatic collections as well as rim-enhancing collection along  the inferior peritoneum (now measuring 9 x 4.5 x 3.5 cm, previously 13  x 9 x 5.5 cm). Decreased intraperitoneal fluid, now trace  along the  paracolic gutters and pelvis. No free intra-abdominal air.    Unchanged hepatosplenomegaly and marked renal  enlargement/hypoattenuation. No hydronephrosis or stones. The adrenal  glands are unremarkable. No abnormally dilated or thickened loops of  bowel.    Bones / Soft Tissues: No acute skeletal lesion or suspicious skeletal  abnormality.       Impression    IMPRESSION:   1.  Evolving sequelae of necrotizing pancreatitis with cystgastrostomy  tube in unchanged position. Peripancreatic and walled off peritoneal  collections have overall decreased in size.  2.  Gastrostomy tube balloon appears to be partially within the  anterior abdominal wall fascia, possibly external to the stomach.  Recommended IR consult for further evaluation.  3.  Unchanged renal edema/hypoenhancement.  4.  Unchanged large right pleural effusion.    I have personally reviewed the examination and initial interpretation  and I agree with the findings.    NEHEMIAH SHARMA MD         SYSTEM ID:  [id number]   CT Abdomen Pelvis w Contrast    Narrative    EXAMINATION: CT ABDOMEN PELVIS W CONTRAST, 9/13/2021 2:12 PM    INDICATION: Abdominal abscess/infection suspected; Area of induration  next to G tube insertion, evaluating for extension of abscess    COMPARISON STUDY: CT of the abdomen and pelvis dated 9/7/2021    TECHNIQUE: CT scan of the abdomen and pelvis was performed on  multidetector CT scanner using volumetric acquisition technique and  images were reconstructed in multiple planes with variable thickness  and reviewed on dedicated workstations.     CONTRAST: 74mL Isovue-370 injected IV without oral contrast    CT scan radiation dose is optimized to minimum requisite dose using  automated dose modulation techniques.    FINDINGS:    Lower thorax: Complete collapse of the right lower lobe and partial  atelectasis of the right middle lobe. Large right pleural effusion. No  left pleural effusion.    Liver: No mass. No intrahepatic biliary  ductal dilation.    Biliary System: Normal gallbladder. No extrahepatic biliary ductal  dilation.    Pancreas: No mass or pancreatic ductal dilation. . Peripancreatic  collection anterior to the head of the pancreas now measures 4.1 x 2.7  x 5.1 cm, previously 4.3 x 3.4 x 5.8 cm, when measured in similar  fashion. Mesenteric fluid collection now measures 3.5 x 2.8 x 3.9 cm,  previously 3.6 x 2.6 x 4.0 centimeters. Cyst gastrostomy tube in  unchanged position.    Adrenal glands: No mass or nodules    Spleen: Splenomegaly measuring 14.8 cm in AP diameter.    Kidneys: No suspicious mass, obstructing calculus or hydronephrosis.   Edematous appearance of the bilateral kidneys.    Gastrointestinal tract : Gastrojejunostomy tube in place with the  jejunal tip in the proximal jejunum. The balloon of the  gastrojejunostomy tube is retracted and within the anterior abdominal  subcutaneous tissue, at the level of skin. No fluid collection around  the gastrojejunostomy tube. Normal caliber small bowel.    Mesentery/peritoneum/retroperitoneum: No mass. Stable free fluid  within the bilateral paracolic gutters.    Lymph nodes: Numerous mesenteric lymph nodes, reactive    Vasculature: No infrarenal aortic aneurysm.    Pelvis: Urinary bladder is normal.  Prostate and seminal vesicles are  within normal limits.    Osseous structures: No aggressive or acute osseous lesion.      Soft tissues: Edema adjacent to the gastrojejunostomy tube balloon.      Impression    IMPRESSION:   1. Gastrojejunostomy balloon is pulled back into the anterior  abdominal subcutaneous tissues to the level of skin. Recommend IR  consult for further evaluation.  2. Sequela of necrotizing pancreatitis with cyst gastrostomy tube in  unchanged position. The peripancreatic and peritoneal fluid  collections are overall decreased in size. No new fluid collections.  3. Unchanged renal edema.  4. Large right pleural effusion with complete atelectasis of the  right  lower lobe and partial atelectasis of the right middle lobe.    I have personally reviewed the examination and initial interpretation  and I agree with the findings.    NEHEMIAH SHARMA MD         SYSTEM ID:  [id number]   XR Surgery CESIA Fluoro L/T 5 Min w Stills    Narrative    This exam was marked as non-reportable because it will not be read by a   radiologist or a [place] non-radiologist provider.         XR Chest Port 1 View    Narrative    EXAM: XR CHEST PORT 1 VIEW  9/16/2021 1:27 PM      HISTORY: worsening SOB, h/o of pleural effusion    COMPARISON: Chest x-ray 9/3/2021    FINDINGS: Portable semiupright AP radiograph of the chest. Right IJ  central venous catheter tip projects over the mid SVC. Left upper  extremity PICC tip projects over the low SVC. The trachea is midline.  The cardiac silhouette is partially obscured. Large right pleural  effusion with near complete opacification of the right hemithorax. No  left pleural effusion. No definite pneumothorax. Surgical tubes  project over the left upper quadrant. Linear radiopacity along the  subcutaneous lower left lateral chest wall possibly external to the  patient.      Impression    IMPRESSION: Near complete opacification of the right hemithorax,  likely representing large right pleural effusion associated with near  total compressive atelectasis of the right lung.    I have personally reviewed the examination and initial interpretation  and I agree with the findings.    JOHN OCONNOR MD         SYSTEM ID:  [id number]   POC US Guide for Thorcentesis    Impression    Large simple appearing pleural effusion on the right. 1500 mls removed. See consult note for details   XR Chest Port 1 View    Narrative    EXAMINATION: XR CHEST PORT 1 VIEW, 9/19/2021 5:01 PM    COMPARISON: 9/16/2020 oh    HISTORY: follow-up on right-sided thoracentesis    FINDINGS: Decreased right pleural effusion following thoracentesis  with moderate pleural effusion remaining. Left  PICC line tip is in the  low SVC. Right IJ lumen catheter tip in the low SVC. Heart size is  normal. Left lung is clear. Improved aeration in the right lung.      Impression    IMPRESSION: Decreased pleural fluid on the right with moderate  remaining right pleural effusion.      VIKTOR BRUCE MD         SYSTEM ID:  [id number]   XR Surgery CESIA Fluoro L/T 5 Min w Stills    Narrative    This exam was marked as non-reportable because it will not be read by a   radiologist or a [facility] non-radiologist provider.         CT Dental wo Contrast    Narrative    CT DENTAL WO CONTRAST 9/20/2021 7:55 PM    History:  looking for active infection    Comparison:  None      TECHNIQUE: 3-D reconstruction by the technologists, with curved  multiplanar reformat of thin section imaging through the mandible and  maxilla obtained without intravenous contrast.    FINDINGS:  Crown of the third left maxillary molar is fractured and there is a  small periapical lucency. Multiple caries of the maxillary incisors,  canines, and first premolars. Caries of the left first and third  mandibular molars.    No significant soft tissue swelling or mass. Normal facial bone  alignment. No bony erosion.     Visualized portions of the paranasal sinuses are clear. Normal  temporomandibular joints.      Impression    IMPRESSION: Periapical abscess about the severely carious left third  maxillary molar. Multiple other dental caries without abscesses,  mostly of the anterior maxillary teeth.    I have personally reviewed the examination and initial interpretation  and I agree with the findings.    MICH GALE MD         SYSTEM ID:  [id number]   XR Chest Port 1 View    Narrative    XR CHEST PORT 1 VIEW9/28/2021 2:14 PM    INDICATION: hypotension and lactic acidosis, pancreatic hydrothorax,  assess for new signs of infection    COMPARISON:  9/19/2021    FINDINGS: AP view of the chest. Left arm PICC tip near cavoatrial  junction. Right IJ dual-lumen venous  catheter tip in the low SVC.  Large right-sided pleural effusion and hazy airspace opacities  throughout the right chest. Cardiac mediastinal silhouette is stable.  Left lung is clear. Bones are stable. Trachea is midline.      Impression    IMPRESSION:   1. Moderate to large right-sided pleural effusion similar to exam  2021. Right lung opacities, likely atelectasis.  2. Support devices in unchanged position.    I have personally reviewed the examination and initial interpretation  and I agree with the findings.    VIKTOR BRUCE MD         SYSTEM ID:  [id number]   Echo Complete     Value    LVEF  55-60%    Narrative    [id number]  [id number]  [id number]  [id number]^ILIR^ERICA^ARNALDO     [facility],[place]  Echocardiography Laboratory  [address] [place] [address]     Name: IRMA PARISH  MRN: [id number]  : 1990  Study Date: 2021 10:01 AM  Age: 30 yrs  Gender: Male  Patient Location: [facility]  Reason For Study: Shock  Ordering Physician: ERICA HAZEL  Performed By: Jane Hazel RDCS     BSA: 1.7 m2  Height: 65 in  Weight: 148 lb  HR: 123  BP: 85/66 mmHg  ______________________________________________________________________________  Procedure  Complete Portable Echo Adult.  ______________________________________________________________________________  Interpretation Summary     The Ejection Fraction is estimated at 50-55%.  Global and regional left ventricular function is normal with an EF of 55-60%.  Right ventricular function, chamber size, wall motion, and thickness are  normal.  Dilation of the inferior vena cava is present with abnormal respiratory  variation in diameter.  No pericardial effusion is present.  There is no prior study for direct comparison.  ______________________________________________________________________________  Left Ventricle  Global and regional left ventricular function is normal with an EF of 55-60%.  Left  ventricular wall thickness is normal. Left ventricular size is normal.  Diastolic function not assessed due to tachycardia. No regional wall motion  abnormalities are seen.     Right Ventricle  Right ventricular function, chamber size, wall motion, and thickness are  normal.     Atria  Both atria appear normal.     Mitral Valve  The mitral valve is normal.     Aortic Valve  Aortic valve is normal in structure and function.     Tricuspid Valve  The tricuspid valve is normal. Trace tricuspid insufficiency is present.  Pulmonary artery systolic pressure cannot be assessed.     Pulmonic Valve  The pulmonic valve is normal.     Vessels  The thoracic aorta is normal. The pulmonary artery and bifurcation cannot be  assessed. Dilation of the inferior vena cava is present with abnormal  respiratory variation in diameter.     Pericardium  No pericardial effusion is present.     Compared to Previous Study  There is no prior study for direct comparison.  ______________________________________________________________________________  MMode/2D Measurements & Calculations     IVSd: 0.82 cm  LVIDd: 5.6 cm  LVIDs: 3.3 cm  LVPWd: 1.1 cm  FS: 41.4 %  LV mass(C)d: 203.8 grams  LV mass(C)dI: 117.1 grams/m2  Ao root diam: 3.4 cm  asc Aorta Diam: 2.7 cm  LVOT diam: 2.3 cm  LVOT area: 4.2 cm2  LA Volume (BP): 47.9 ml  LA Volume Index (BP): 27.5 ml/m2  RWT: 0.39     Doppler Measurements & Calculations  MV E max ángela: 111.0 cm/sec  MV dec slope: 716.0 cm/sec2  MV dec time: 0.15 sec  PA acc time: 0.10 sec  E/E' av.4  Lateral E/e': 9.3  Medial E/e': 7.6     ______________________________________________________________________________  Report approved by: Anita Chavez 2021 10:35 AM               Discharge Medications   Current Discharge Medication List      START taking these medications    Details   acetaminophen (TYLENOL) 325 MG/10.15ML solution 10.15 mLs (325 mg) by Oral or PEGJ tube route every 4 hours as needed for mild pain  or fever    Associated Diagnoses: Alcohol-induced acute pancreatitis with infected necrosis      !! amylase-lipase-protease (CREON 24) 20299-18072 units CPEP per EC capsule 2 capsules by Per G Tube route 3 times daily (with meals)    Associated Diagnoses: Severe malnutrition (H)      !! amylase-lipase-protease (CREON 24) 71563-33438 units CPEP per EC capsule 1-2 capsules by Per Feeding Tube route every 4 hours    Associated Diagnoses: Severe malnutrition (H); Necrotizing pancreatitis      Cetaphil Moisturizing (CETAPHIL) external lotion Apply topically 2 times daily as needed for dry skin or irritation (Apply to chest)    Associated Diagnoses: Dry skin      cyclobenzaprine (FLEXERIL) 10 MG tablet Take 1 tablet (10 mg) by mouth daily as needed for muscle spasms    Associated Diagnoses: Other chronic postprocedural pain      folic acid (FOLVITE) 1 MG tablet 1 tablet (1 mg) by Oral or Feeding Tube route daily    Associated Diagnoses: Severe malnutrition (H)      gabapentin (NEURONTIN) 100 MG capsule Take 1 capsule (100 mg) by mouth three times a week    Associated Diagnoses: Peripheral polyneuropathy      guaiFENesin (ROBITUSSIN) 20 mg/mL SOLN solution Take 10 mLs by mouth every 4 hours as needed for cough    Associated Diagnoses: Cough      Guar Gum (FIBER MODULAR, NUTRISOURCE FIBER,) packet 1 packet by Per Feeding Tube route 3 times daily    Associated Diagnoses: Severe malnutrition (H)      HYDROmorphone, STANDARD CONC, (DILAUDID) 1 MG/ML oral solution Take 1 mL (1 mg) by mouth every 3 hours as needed for moderate to severe pain for 7 days, THEN 1 mL (1 mg) every 4 hours as needed for moderate to severe pain for 14 days, THEN 1 mL (1 mg) 4 times daily as needed for moderate to severe pain for 14 days, THEN 1 mL (1 mg) 3 times daily as needed for moderate to severe pain.  Refills: 0    Comments: Slow taper, goal is to be off opioids completely (pain is likely multifactorial).  Associated Diagnoses: Other chronic  postprocedural pain; Peripheral polyneuropathy      hydrOXYzine (ATARAX) 25 MG tablet 1-2 tablets (25-50 mg) by Oral or Feeding Tube route 3 times daily as needed for anxiety or other (adjuvant pain)    Associated Diagnoses: Anhedonia      methylphenidate (RITALIN) 5 MG tablet 1 tablet (5 mg) by Per Feeding Tube route 2 times daily  Refills: 0    Associated Diagnoses: Anhedonia      midodrine (PROAMATINE) 5 MG tablet 3 tablets (15 mg) by Per Feeding Tube route 3 times daily    Associated Diagnoses: Acute kidney failure with tubular necrosis (H)      nicotine (COMMIT) 2 MG lozenge Place 2 lozenges (4 mg) inside cheek every hour as needed for smoking cessation    Associated Diagnoses: Tobacco abuse      ondansetron (ZOFRAN) 4 MG tablet 1 tablet (4 mg) by Per G Tube route every 8 hours as needed for nausea or vomiting    Associated Diagnoses: Necrotizing pancreatitis      pantoprazole (PROTONIX) 2 mg/mL SUSP suspension 20 mLs (40 mg) by Oral or Feeding Tube route every morning (before breakfast)    Associated Diagnoses: Necrotizing pancreatitis      potassium chloride (KLOR-CON) 20 MEQ packet 20 mEq by Per Feeding Tube route daily    Associated Diagnoses: Acute kidney failure with tubular necrosis (H)      prochlorperazine (COMPAZINE) 5 MG tablet Take 1 tablet (5 mg) by mouth every 6 hours as needed for nausea or vomiting    Associated Diagnoses: Necrotizing pancreatitis      protein modular (PROSOURCE TF FREE) LIQD 1 packet by Per Feeding Tube route daily    Associated Diagnoses: Severe malnutrition (H)      sevelamer carbonate, RENVELA, 0.8 GM PACK Packet 2 packets (1.6 g) by Oral or Feeding Tube route 3 times daily    Associated Diagnoses: Severe malnutrition (H)      silver nitrate (ARZOL) 75-25 % miscellaneous Apply topically daily as needed for wound care    Associated Diagnoses: Gastrostomy tube in place (H)      sodium bicarbonate 325 MG tablet 1 tablet (325 mg) by Per Feeding Tube route every 4 hours     Associated Diagnoses: Severe malnutrition (H)      thiamine (B-1) 100 MG tablet 1 tablet (100 mg) by Oral or Feeding Tube route daily    Associated Diagnoses: Severe malnutrition (H)      zinc oxide (DESITIN) 40 % external ointment Apply topically every hour as needed for irritation    Associated Diagnoses: Gastrostomy tube in place (H)       !! - Potential duplicate medications found. Please discuss with provider.      CONTINUE these medications which have NOT CHANGED    Details   melatonin 1 MG/ML LIQD liquid 6 mLs (6 mg) by Oral or Feeding Tube route daily (with dinner)    Associated Diagnoses: Anhedonia           Allergies   Allergies   Allergen Reactions     Tylenol [Acetaminophen] Itching

## 2021-10-13 NOTE — PLAN OF CARE
Physical Therapy Discharge Summary    Reason for therapy discharge:    Discharged to [facility]    Progress towards therapy goal(s). See goals on Care Plan in [facility] electronic health record for goal details.  Goals not met.  Barriers to achieving goals:   discharge from facility.    Therapy recommendation(s):    Continued therapy is recommended.  Rationale/Recommendations:  Pt continues to present with significant functional mobility limitations secondary to weakness, contracture, and activity tolerance limitations. Will benefit from continued skilled therapies.

## 2021-10-13 NOTE — PLAN OF CARE
Occupational Therapy Discharge Summary    Reason for therapy discharge:    Discharged to LTACH    Progress towards therapy goal(s). See goals on Care Plan in [facility] electronic health record for goal details.  Goals not met.  Barriers to achieving goals:   discharge from facility.    Therapy recommendation(s):    Continued therapy is recommended.  Rationale/Recommendations:  continue skilled OT at [facility] to cont to progress IND with ADLs and transfers.

## 2021-10-28 ENCOUNTER — TELEPHONE (OUTPATIENT)
Dept: GASTROENTEROLOGY | Facility: CLINIC | Age: 31
End: 2021-10-28

## 2021-10-28 NOTE — TELEPHONE ENCOUNTER
[facility] Call Center    Phone Message    May a detailed message be left on voicemail: yes     Reason for Call: Other: Jaclyn Greer at [facility] called to verify if patient's sutures can be removed and if not, when can they be removed.  Please follow up with her at [phone].  Thank you!     Action Taken: Message routed to:  [facility] Sor[facility] Team UC    Travel Screening: Not Applicable

## 2021-10-29 NOTE — TELEPHONE ENCOUNTER
Returned call to [phone], left VM   They are absorbable sutures so they should actually just rub off with washing but someone can cut and remove them now.     Mickie Rooney, RN, BSN,   [facility]  Care coordinator

## 2021-11-23 NOTE — PROGRESS NOTES
Resident/Fellow Attestation   I, Cathryn Zuniga, was present with the medical/NAHUM student who participated in the service and in the documentation of the note.  I have verified the history and personally performed the physical exam and medical decision making.  I agree with the assessment and plan of care as documented in the note.      Cathryn Zuniga MD  PGY1  Date of Service (when I saw the patient): 07/27/21    [facility]    Progress Note - Maroon 3 Service        Date of Admission:  6/28/2021    Assessment & Plan   Dax Villareal is a 30 year old male with hx of alcohol use disorder admitted on 6/28/2021 after recent prolonged admission at [facility] in [place] who has severe acute alcoholic hepatitis c/b HE, ESRD requiring HD, and malnutrition in the setting of acute necrotizing pancreatitis, s/p PEG-J placement on 7/13. He arrived with acute respiratory failure and septic shock that have resolved, and his SBP has been appropriately treated. He requires continued management for ESRD, hepatic encephalopathy, infected pancreatic pseudocyst, nutritional support via PEG-J, and secondary bacterial peritonitis.     Today:  - Dialysis today  - Repeat CT abdomen today this afternoon  - Possible thoracentesis with IR (patient refused bedside attempt yesterday)     # Necrotizing pancreatitis w/ infected pseudocyst s/p RP Drain and Cystogastrostomy, improving  # Septic Shock, resolved   # Bacterial Peritonitis, secondary to infected pseudocyst    # Leukocytosis, stable  # Peripancreatic fluid collection growing VRE   Etiology of the septic shock that he came in with likely due to necrotizing pancreatitis, infected pseudocyst, and peritonitis. Had percutaneous RP drain placed for drainage of pancreatic collection. Diagnostic para returned with peritonitis.     Per discussion with ID, ongoing leukocytosis is not unexpected with nec panc. CT 7/18 showed unchanged moderate  ascites and collection size. Cystgastrostomy on 7/21 with therapeutic para. Repeat endoscopic necrosectomy later this week (currently scheduled for Thursday 7/29).    - Repeat CT today planned for after HD  - Continue Ceftriaxone + Linezolid through endoscopic debridement this week   - PRN paracentesis  - Daily CBC  - MAP goal > 65  - GI following, appreciate recs               - Pancreatic enzymes    - For back pain: Tylenol PRN and Dilaudid 2mg q3h PRN  - Blood cultures w/ NG after 12 hrs     # Dyspnea iso pleural effusion v pulm edema  7/19 evening, patient developed increasing cough with thick, clear secretions. CXR w/ increased RLL haziness; ddx includes pleural effusion v pneumonia v atelectasis. Most likely effusion as patient is likely fluid up due to declining dialysis and therapeutic para; unlikely pneumonia as sputum is non-purulent and patient is afebrile. Sputum clx grew Enterococcus faecium, thought to be contaminant.      7/22, repeat CXR showed increased diffuse haziness over the entire right lung. Likely hepatic hydrothorax. Ongoing increased WOB with use of accessory muscles, diminished lung sounds. Repeat CXR with increasing effusion. Has been refusing thoracentesis last several days.   Despite this, patient looks improved on 7/27 with very minimal increased WOB without accessory muscle use and clearer lung sounds in RLL.      - IR consult for thoracentesis (pt declined 7/26; still declining 7/27 and in HD until mid afternoon; will attempt to obtain today or tomorrow)              - if thora obtained will send cell count, gram stain, cultures      # Severe malnutrition in setting of chronic illness  PEG-J placed 7/13. Procedure went well, patient and nutrition have discussed plan to run TFs during the day only from 12-6 pm at 70 mL/hr and work on PO intake, he intermittently refuses these however.      - Dietitian consult for new tube feeds, appreciate recs              - Continue TFs at 70 mL/hr,  12-6 pm (though intermittently refusing)                - Cont panc enzymes  - Regular Diet, thin liquids per SLP  - Continue multivitamins  - Pantoprazole 40 mg IV daily  - Zofran PRN     # Alcoholic hepatitis complicated by portal hypertension, ascites, and HE - improving   # Hx of Alcohol use disorder c/b withdrawal   # Anemia  # Coagulopathy   Patient with a history of marked alcohol use and alcoholic hepatitis in the past. Was given a course of steroids (1 week) during previous admission at OSH. Patient mental status has been stable, alert and oriented x4 for a couple weeks. Patient has consistently had 1-2 pRBC infusions weekly d/t Hgb drops of 0.3-0.5 every day (etilogy likely chronic liver disease, chronic kidney disease, phlebotomy, dilution, poor nutrition, ect). Hgb stable around 8.3-8.4 for several days.      - Lactulose 20g QID and PRN and rifaximin 550mg BID - as patient allows  - Trend MELD labs  - Daily folic acid and thiamine  - Delirium precautions  - Melatonin 6mg at bedtime  - Daily CBC   - Transfuse if hemoglobin < 7, platelet < 10 or < 30 with bleeding  - Continue to monitor for signs of bleeding     MELD-Na score: 33 at 7/27/2021  7:18 AM  MELD score: 32 at 7/27/2021  7:18 AM  Calculated from:  Serum Creatinine: 3.96 mg/dL at 7/27/2021  7:18 AM  Serum Sodium: 133 mmol/L at 7/27/2021  7:18 AM  Total Bilirubin: 4.6 mg/dL at 7/27/2021  7:18 AM  INR(ratio): 1.79 at 7/26/2021  6:01 AM  Age: 30 years     # ESRD on HD (MWF)  # Hypervolemia causing acute respiratory failure, resolved  Nephrology note 7/21 indicates that patient has been on renal replacement therapy for two months and pt status has most likely transitioned from ARF  to ESRD at this point.          > Hemodialysis as per renal              - Midodrine 10 mg PRN before dialysis sessions     > Daily chemistry labs, strict I/O, and daily weights  - Aspiration precautions  - Supplemental oxygen as needed to keep sats above 89%     # Goals of  Care  Please see additional interval note for in depth details regarding conversation on 7/9 about goals of care. Patient's long term prognosis is still quite poor given his multi organ failure. Care conference on 7/15, pt did not wish to participate. Met with sisters Noni (on phone) and Melyssa and close friend Leroy with palliative, nephrology and general medicine teams today to discuss Dax's current improving status, poor prognosis and wishes to continue full cares. Plan to meet Fridays at 4 pm with teams and family for weekly updates.     - Hepatology confirmed pt will not be a transplant candidate     Diet: Snacks/Supplements Adult: Ensure Clear; With Meals  Fluid restriction 1200 ML FLUID  Adult Formula Drip Feeding: Specified Time Vital 1.5; Jejunostomy; Goal Rate: 70; mL/hr; From: 12:00 PM; 6:00 PM; Medication - Feeding Tube Flush Frequency: At least 15-30 mL water before and after medication administration and with tube clogging;...  Regular Diet Adult    DVT Prophylaxis: Pneumatic Compression Devices  Rdz Catheter: Not present  Fluids: 10 mL NS TKO  Central Lines: PRESENT  PICC Triple Lumen 06/28/21 Left-Site Assessment: WDL  CVC Double Lumen Right-Site Assessment: WDL  Code Status: Full Code      Disposition Plan   Expected discharge: >7 days, recommended to transitional care unit once nutrition improved, infected pancreatic pseudocyst is well managed and plan for dialysis is in place.     The patient's care was discussed with the Attending Physician, Dr. Taylor.    Jaime Amin, MS3  Medical Student  [address]  Securely message with the Vocera Web Console (learn more here)  Text page via [employer] Paging/Directory  Please see sign in/sign out for up to date coverage information  _____________________________________________________________________    Interval History   No acute events overnight. Nursing notes reviewed.     This morning,  patient appears overall improved, but still very ill. Continues to have abdominal pain, but abdomen looks significantly less distended. Work of breathing appears to be decreased.    Data reviewed today: I reviewed all medications, new labs and imaging results over the last 24 hours.     Physical Exam   Vital Signs: Temp: 97.7  F (36.5  C) Temp src: Oral BP: 111/63 Pulse: 106   Resp: 18 SpO2: 96 % O2 Device: Nasal cannula Oxygen Delivery: 4 LPM  Weight: 175 lbs 4.25 oz    Gen: Lying down in bed, appears similar to yesterday. Unable to communicate very well d/t severe fatigue  HEENT: Conjunctival icterus  CV: Tachycardic. Regular rhythm.   Resp: Slightly increased WOB, improved from past few days. No wheezing or coughing. Upper lobes CTA. Diminished breath sounds RLL, improved since the weekend.  Abd: Refused palpation today, PEG J in place without surrounding erythema. Abdomen less distended today.  Skin: Jaundiced.  Ext: Well perfused, no edema  Neuro: Oriented x3, responding appropriately to questions, tremulous    Data   Recent Labs   Lab 07/27/21  0718 07/26/21  0601 07/25/21  0553 07/24/21  0700   WBC 14.5* 16.4* 24.5* 24.3*   HGB 8.4* 8.5* 8.3* 8.7*   MCV 95 95 94 94    219 208 240   INR  --  1.79* 1.85* 1.86*    134 132* 130*   POTASSIUM 3.5 3.5 3.7 3.9   CHLORIDE 98 100 99 98   CO2 23 24 24 23   BUN 39* 28 20 32*   CR 3.96* 3.16* 2.32* 3.32*   ANIONGAP 12 10 9 9   JANENE 8.9 8.7 8.1* 8.6   * 79 90 77   ALBUMIN 1.9* 2.2* 2.3* 2.4*   PROTTOTAL 7.1 6.9 7.0 7.0   BILITOTAL 4.6* 5.1* 5.1* 5.6*   ALKPHOS 138 147 169* 144   ALT 8 11 12 10   AST 27 32 38 36     No results found for this or any previous visit (from the past 24 hour(s)).   [Follow-Up: _____] : a [unfilled] follow-up visit [FreeTextEntry1] : SLE

## 2021-12-19 ENCOUNTER — LAB REQUISITION (OUTPATIENT)
Dept: LAB | Facility: CLINIC | Age: 31
End: 2021-12-19
Payer: COMMERCIAL

## 2021-12-19 DIAGNOSIS — U07.1 COVID-19: ICD-10-CM

## 2021-12-19 PROCEDURE — U0005 INFEC AGEN DETEC AMPLI PROBE: HCPCS | Mod: ORL | Performed by: FAMILY MEDICINE

## 2021-12-20 LAB — SARS-COV-2 RNA RESP QL NAA+PROBE: NEGATIVE

## 2021-12-21 ENCOUNTER — LAB REQUISITION (OUTPATIENT)
Dept: LAB | Facility: CLINIC | Age: 31
End: 2021-12-21
Payer: COMMERCIAL

## 2021-12-21 DIAGNOSIS — U07.1 COVID-19: ICD-10-CM

## 2021-12-21 PROCEDURE — U0005 INFEC AGEN DETEC AMPLI PROBE: HCPCS | Mod: ORL | Performed by: FAMILY MEDICINE

## 2021-12-22 LAB — SARS-COV-2 RNA RESP QL NAA+PROBE: NEGATIVE

## 2021-12-28 ENCOUNTER — LAB REQUISITION (OUTPATIENT)
Dept: LAB | Facility: CLINIC | Age: 31
End: 2021-12-28
Payer: COMMERCIAL

## 2021-12-28 DIAGNOSIS — U07.1 COVID-19: ICD-10-CM

## 2021-12-28 PROCEDURE — U0005 INFEC AGEN DETEC AMPLI PROBE: HCPCS | Mod: ORL | Performed by: FAMILY MEDICINE

## 2021-12-29 LAB — SARS-COV-2 RNA RESP QL NAA+PROBE: NEGATIVE

## 2022-01-02 PROCEDURE — U0003 INFECTIOUS AGENT DETECTION BY NUCLEIC ACID (DNA OR RNA); SEVERE ACUTE RESPIRATORY SYNDROME CORONAVIRUS 2 (SARS-COV-2) (CORONAVIRUS DISEASE [COVID-19]), AMPLIFIED PROBE TECHNIQUE, MAKING USE OF HIGH THROUGHPUT TECHNOLOGIES AS DESCRIBED BY CMS-2020-01-R: HCPCS | Mod: ORL | Performed by: FAMILY MEDICINE

## 2022-01-03 ENCOUNTER — LAB REQUISITION (OUTPATIENT)
Dept: LAB | Facility: CLINIC | Age: 32
End: 2022-01-03
Payer: COMMERCIAL

## 2022-01-03 DIAGNOSIS — N18.6 END STAGE RENAL DISEASE (H): ICD-10-CM

## 2022-01-03 DIAGNOSIS — K85.92 ACUTE PANCREATITIS WITH INFECTED NECROSIS, UNSPECIFIED: ICD-10-CM

## 2022-01-04 LAB
ALBUMIN SERPL-MCNC: 2.5 G/DL (ref 3.5–5)
ALP SERPL-CCNC: 73 U/L (ref 45–120)
ALT SERPL W P-5'-P-CCNC: <9 U/L (ref 0–45)
ANION GAP SERPL CALCULATED.3IONS-SCNC: 8 MMOL/L (ref 5–18)
AST SERPL W P-5'-P-CCNC: 21 U/L (ref 0–40)
BILIRUB SERPL-MCNC: 0.7 MG/DL (ref 0–1)
BUN SERPL-MCNC: 14 MG/DL (ref 8–22)
CALCIUM SERPL-MCNC: 9.1 MG/DL (ref 8.5–10.5)
CHLORIDE BLD-SCNC: 103 MMOL/L (ref 98–107)
CO2 SERPL-SCNC: 26 MMOL/L (ref 22–31)
CREAT SERPL-MCNC: 2.58 MG/DL (ref 0.7–1.3)
ERYTHROCYTE [DISTWIDTH] IN BLOOD BY AUTOMATED COUNT: 14.6 % (ref 10–15)
GFR SERPL CREATININE-BSD FRML MDRD: 33 ML/MIN/1.73M2
GLUCOSE BLD-MCNC: 83 MG/DL (ref 70–125)
HCT VFR BLD AUTO: 25.1 % (ref 40–53)
HGB BLD-MCNC: 8 G/DL (ref 13.3–17.7)
MCH RBC QN AUTO: 27.6 PG (ref 26.5–33)
MCHC RBC AUTO-ENTMCNC: 31.9 G/DL (ref 31.5–36.5)
MCV RBC AUTO: 87 FL (ref 78–100)
PLATELET # BLD AUTO: 219 10E3/UL (ref 150–450)
POTASSIUM BLD-SCNC: 4 MMOL/L (ref 3.5–5)
PROT SERPL-MCNC: 6.6 G/DL (ref 6–8)
RBC # BLD AUTO: 2.9 10E6/UL (ref 4.4–5.9)
SODIUM SERPL-SCNC: 137 MMOL/L (ref 136–145)
WBC # BLD AUTO: 3.4 10E3/UL (ref 4–11)

## 2022-01-04 PROCEDURE — P9603 ONE-WAY ALLOW PRORATED MILES: HCPCS | Mod: ORL | Performed by: NURSE PRACTITIONER

## 2022-01-04 PROCEDURE — 85027 COMPLETE CBC AUTOMATED: CPT | Mod: ORL | Performed by: NURSE PRACTITIONER

## 2022-01-04 PROCEDURE — 80053 COMPREHEN METABOLIC PANEL: CPT | Mod: ORL | Performed by: NURSE PRACTITIONER

## 2022-01-04 PROCEDURE — 36415 COLL VENOUS BLD VENIPUNCTURE: CPT | Mod: ORL | Performed by: NURSE PRACTITIONER

## 2022-01-06 PROCEDURE — U0005 INFEC AGEN DETEC AMPLI PROBE: HCPCS | Mod: ORL | Performed by: FAMILY MEDICINE

## 2022-01-21 ENCOUNTER — TRANSFERRED RECORDS (OUTPATIENT)
Dept: HEALTH INFORMATION MANAGEMENT | Facility: CLINIC | Age: 32
End: 2022-01-21
Payer: COMMERCIAL

## 2022-02-21 ENCOUNTER — LAB REQUISITION (OUTPATIENT)
Dept: LAB | Facility: CLINIC | Age: 32
End: 2022-02-21
Payer: COMMERCIAL

## 2022-02-21 DIAGNOSIS — N18.6 END STAGE RENAL DISEASE (H): ICD-10-CM

## 2022-02-22 LAB
ALBUMIN SERPL-MCNC: 2.9 G/DL (ref 3.5–5)
ALP SERPL-CCNC: 87 U/L (ref 45–120)
ALT SERPL W P-5'-P-CCNC: 9 U/L (ref 0–45)
ANION GAP SERPL CALCULATED.3IONS-SCNC: 8 MMOL/L (ref 5–18)
AST SERPL W P-5'-P-CCNC: 23 U/L (ref 0–40)
BILIRUB SERPL-MCNC: 0.6 MG/DL (ref 0–1)
BUN SERPL-MCNC: 19 MG/DL (ref 8–22)
CALCIUM SERPL-MCNC: 10.4 MG/DL (ref 8.5–10.5)
CHLORIDE BLD-SCNC: 102 MMOL/L (ref 98–107)
CO2 SERPL-SCNC: 27 MMOL/L (ref 22–31)
CREAT SERPL-MCNC: 3.29 MG/DL (ref 0.7–1.3)
GFR SERPL CREATININE-BSD FRML MDRD: 25 ML/MIN/1.73M2
GLUCOSE BLD-MCNC: 82 MG/DL (ref 70–125)
POTASSIUM BLD-SCNC: 4.5 MMOL/L (ref 3.5–5)
PROT SERPL-MCNC: 7.3 G/DL (ref 6–8)
SODIUM SERPL-SCNC: 137 MMOL/L (ref 136–145)

## 2022-02-22 PROCEDURE — 36415 COLL VENOUS BLD VENIPUNCTURE: CPT | Mod: ORL | Performed by: FAMILY MEDICINE

## 2022-02-22 PROCEDURE — P9604 ONE-WAY ALLOW PRORATED TRIP: HCPCS | Mod: ORL | Performed by: FAMILY MEDICINE

## 2022-02-22 PROCEDURE — 80053 COMPREHEN METABOLIC PANEL: CPT | Mod: ORL | Performed by: FAMILY MEDICINE

## 2022-03-14 PROCEDURE — U0003 INFECTIOUS AGENT DETECTION BY NUCLEIC ACID (DNA OR RNA); SEVERE ACUTE RESPIRATORY SYNDROME CORONAVIRUS 2 (SARS-COV-2) (CORONAVIRUS DISEASE [COVID-19]), AMPLIFIED PROBE TECHNIQUE, MAKING USE OF HIGH THROUGHPUT TECHNOLOGIES AS DESCRIBED BY CMS-2020-01-R: HCPCS | Mod: ORL | Performed by: FAMILY MEDICINE

## 2022-03-15 ENCOUNTER — LAB REQUISITION (OUTPATIENT)
Dept: LAB | Facility: CLINIC | Age: 32
End: 2022-03-15
Payer: COMMERCIAL

## 2022-03-15 DIAGNOSIS — U07.1 COVID-19: ICD-10-CM

## 2022-03-16 LAB — SARS-COV-2 RNA RESP QL NAA+PROBE: NEGATIVE

## 2022-03-17 ENCOUNTER — LAB REQUISITION (OUTPATIENT)
Dept: LAB | Facility: CLINIC | Age: 32
End: 2022-03-17
Payer: COMMERCIAL

## 2022-03-17 DIAGNOSIS — R73.03 PREDIABETES: ICD-10-CM

## 2022-03-17 DIAGNOSIS — K85.92 ACUTE PANCREATITIS WITH INFECTED NECROSIS, UNSPECIFIED: ICD-10-CM

## 2022-03-18 LAB — HBA1C MFR BLD: 4.5 %

## 2022-03-18 PROCEDURE — 36415 COLL VENOUS BLD VENIPUNCTURE: CPT | Mod: ORL | Performed by: FAMILY MEDICINE

## 2022-03-18 PROCEDURE — 83036 HEMOGLOBIN GLYCOSYLATED A1C: CPT | Mod: ORL | Performed by: FAMILY MEDICINE

## 2022-03-18 PROCEDURE — P9603 ONE-WAY ALLOW PRORATED MILES: HCPCS | Mod: ORL | Performed by: FAMILY MEDICINE

## 2022-04-12 ENCOUNTER — LAB REQUISITION (OUTPATIENT)
Dept: LAB | Facility: CLINIC | Age: 32
End: 2022-04-12
Payer: COMMERCIAL

## 2022-04-12 DIAGNOSIS — N18.6 END STAGE RENAL DISEASE (H): ICD-10-CM

## 2022-04-12 DIAGNOSIS — K70.30 ALCOHOLIC CIRRHOSIS OF LIVER WITHOUT ASCITES (H): ICD-10-CM

## 2022-04-13 LAB
ALT SERPL W P-5'-P-CCNC: <9 U/L (ref 0–45)
AST SERPL W P-5'-P-CCNC: 19 U/L (ref 0–40)
BILIRUB DIRECT SERPL-MCNC: 0.3 MG/DL
HBV CORE AB SERPL QL IA: NEGATIVE
HBV SURFACE AB SERPLBLD QL IA.RAPID: POSITIVE

## 2022-04-13 PROCEDURE — 84460 ALANINE AMINO (ALT) (SGPT): CPT | Mod: ORL | Performed by: INTERNAL MEDICINE

## 2022-04-13 PROCEDURE — P9603 ONE-WAY ALLOW PRORATED MILES: HCPCS | Mod: ORL | Performed by: INTERNAL MEDICINE

## 2022-04-13 PROCEDURE — 86708 HEPATITIS A ANTIBODY: CPT | Mod: ORL | Performed by: INTERNAL MEDICINE

## 2022-04-13 PROCEDURE — 86709 HEPATITIS A IGM ANTIBODY: CPT | Mod: ORL | Performed by: INTERNAL MEDICINE

## 2022-04-13 PROCEDURE — 84450 TRANSFERASE (AST) (SGOT): CPT | Mod: ORL | Performed by: INTERNAL MEDICINE

## 2022-04-13 PROCEDURE — 86704 HEP B CORE ANTIBODY TOTAL: CPT | Mod: ORL | Performed by: INTERNAL MEDICINE

## 2022-04-13 PROCEDURE — 36415 COLL VENOUS BLD VENIPUNCTURE: CPT | Mod: ORL | Performed by: INTERNAL MEDICINE

## 2022-04-13 PROCEDURE — 82248 BILIRUBIN DIRECT: CPT | Mod: ORL | Performed by: INTERNAL MEDICINE

## 2022-04-13 PROCEDURE — 86803 HEPATITIS C AB TEST: CPT | Mod: ORL | Performed by: INTERNAL MEDICINE

## 2022-04-15 LAB
HAV IGG SER QL IA: NEGATIVE
HAV IGM SERPL QL IA: NEGATIVE
HCV AB SERPL QL IA: NEGATIVE

## 2022-04-18 NOTE — IR NOTE
Did she change pcp? Last visit with dr woods was 1/2021   Dr woods said time for a visit but I see sh has seen dr aleman several times in the past year   Patient Name: Dax Villareal  Medical Record Number: [id number]  Today's Date: 7/14/2021    Procedure: Exchange Retroperitoneal Drain Exchange  Proceduralist: RORO العلي    Sedation start time: 1405  Sedation end time: 1435  Sedation medications administered: 1 mg versed, 50 mcg fentanyl  Total sedation time: 30 min     Procedure start time: 1424  Procedure end time: 1435    Report given to: KIRSTEN Meier  : n/a    Other Notes: Pt arrived to IR room 1 from Dialysis. Consent reviewed, pt confirmed. Pt denies any questions or concerns regarding procedure. Pt positioned bumped right lateral, left side up, and [id number] per protocol. Site cleansed and dressed per protocol. Pt tolerated procedure without any noted complications. Pt transferred back to inpatient unit.

## 2022-05-04 ENCOUNTER — TELEPHONE (OUTPATIENT)
Dept: TRANSPLANT | Facility: CLINIC | Age: 32
End: 2022-05-04

## 2022-05-04 NOTE — TELEPHONE ENCOUNTER
General  Route to LPN    Reason for call: Patient wants to self refer himself for kidney & partial liver. Patient is at a long term care facility currently. Patient will be available during the evenings for a call back.     Patient already has a donor in mind - Aravind Hill, 2nd cousin, he is O negative - MRN:  [id number]    Call back needed? Yes  Return Call Needed  Same as documented in contacts section  When to return call?: Greater than one day: Route standard priority

## 2022-05-05 ENCOUNTER — REFERRAL (OUTPATIENT)
Dept: TRANSPLANT | Facility: CLINIC | Age: 32
End: 2022-05-05
Payer: COMMERCIAL

## 2022-05-05 DIAGNOSIS — F10.11 HISTORY OF ALCOHOL ABUSE: Primary | ICD-10-CM

## 2022-05-05 DIAGNOSIS — K76.6 PORTAL HYPERTENSION (H): ICD-10-CM

## 2022-05-05 DIAGNOSIS — N18.6 ESRD (END STAGE RENAL DISEASE) (H): ICD-10-CM

## 2022-05-05 DIAGNOSIS — K74.60 CIRRHOSIS (H): ICD-10-CM

## 2022-05-05 NOTE — LETTER
Dax Villareal  [address]  [address] [place] [id number]                July 7, 2022                                                                                        MEDICAL RECORDS REQUEST    [facility] Records Request                      Facility: [facility]    Thank you for referring your patient to the [facility], in order to process the referral we will need the following information;      1. 2728 form   2. Immunizations records  3. Providers Progress notes, (last 3 note)      Please call our office at [phone] if you have any questions or concerns.                Please fax all paper records to [phone] within 1-3 business days.      Thank you,   The SOT Referral Intake Team     [facility]  Solid Organ Transplant Office  [address] [place] [address], [address] [address]

## 2022-05-05 NOTE — LETTER
LIVER TRANSPLANT  EVALUATION SCHEDULE    Patient:  Dax Villareal   MR#:   [id number]   Coordinator: Jason READ     [phone]   :    Jennyfer KIRK     [phone]  Location:    [facility]  Date(s):   June 28, 2022 & July 5, 2022 July 6, 2022 & August 8, 2022    This is your evaluation schedule, please follow dates and times.  You will   receive reminder phone calls for other tests, but please follow this schedule  only!  If you have any questions about dates and times, please call us on  number listed above.  Thank you, Transplant Services     *NO FOOD or DRINK AFTER 11:00 PM*  (You may only have small amounts of water)    Day/Date:    Tuesday, June 28, 2022  Time Location Activity   7:10 AM Imaging and Lab testing  ([facility] floor [facility]ics and [facility] Center  [address] [place] [address] ) Liver Ultrasound with dopplers=NO FOOD or DRINK 8 HOURS BEFORE TEST!!!   8:00 AM Imaging and Lab testing  ([facility] floor [facility] and Surgery Center ) Blood tests=NO FOOD or DRINK 8 HOURS BEFORE TEST!!!    8:20 AM Imaging and Lab testing  ([facility] floor [facility] and Surgery Center ) Chest X-ray    9:30 AM   Transplant Services  (3rd floor [facility] and Surgery Center) Appointment with Dr. Morrow,  Transplant Surgeon   10:00 AM   Transplant Services  ([facility] floor [facility] and Surgery Center) Appointment with Luisa Choudhary  Registered Dietitian   11:00 AM   Medicine Specialties  ([facility] floor [facility] and Surgery Center) Appointment with Dr. Hurley,  Hepatologist   12:00 PM   Transplant Services  ([facility] floor [facility] and Surgery Center) Appointment with Tootie Jarquin,     1:10 PM Imaging and Lab testing  ([facility] floor [facility] and Surgery Center ) EKG           Day/Date:    Tuesday, July 5, 2022  Time Location Activity   10:00 AM-12:00 PM [facility] CT Clinic  [address]  [address]  [place] [address] CTA Angiogram Coronary Artery--NO CAFFEINE & NO TOBACCO THE DAY OF THE TEST!!!              Day/Date:    Wednesday, July 6, 2022  Time Location Activity   10:45 AM Medicine Specialties  (3rd floor [facility]  [address] [place] [address]) Appointment with Dr. Han,  Nephrologist         Day/Date:    Monday, August 8, 2022  Time Location Activity   9:00 AM [facility]  (3rd floor [facility]  [address] [place] [address])   Appointment with Dr. Pa,  Cardiology

## 2022-05-05 NOTE — LETTER
June 6, 2022      Dax Villareal  [email or web address] [facility]  [address] [place] [address]      Dear Dax,    Thank you for your interest in the Transplant Center at [facility]. We look forward to being a part of your care team and assisting you through the transplant process.    As we discussed, your transplant coordinator is Jason Motta (Liver, Kidney).  You may call your coordinator at any time with questions or concerns.  [phone].    Please complete the following.    1. Fill out and return the enclosed forms    Authorization for Electronic Communication    Authorization to Discuss Protected Health Information    Authorization for Release of Protected Health Information    Authorization for Care Everywhere Release of Information    2. Sign up for:    [employer], access to your electronic medical record (see enclosed pamphlet)    [email or web address], a transplant education website    You can use these tools to learn more about your transplant, communicate with your care team, and track your medical details      Sincerely,      Solid Organ Transplant  [facility]    cc: Care Team

## 2022-05-06 ENCOUNTER — LAB REQUISITION (OUTPATIENT)
Dept: LAB | Facility: CLINIC | Age: 32
End: 2022-05-06
Payer: COMMERCIAL

## 2022-05-06 DIAGNOSIS — R77.0 ABNORMALITY OF ALBUMIN: ICD-10-CM

## 2022-05-06 DIAGNOSIS — I25.10 ATHEROSCLEROTIC HEART DISEASE OF NATIVE CORONARY ARTERY WITHOUT ANGINA PECTORIS: ICD-10-CM

## 2022-05-06 DIAGNOSIS — I50.9 HEART FAILURE, UNSPECIFIED (H): ICD-10-CM

## 2022-05-07 ENCOUNTER — LAB REQUISITION (OUTPATIENT)
Dept: LAB | Facility: CLINIC | Age: 32
End: 2022-05-07
Payer: COMMERCIAL

## 2022-05-07 LAB
ALBUMIN SERPL-MCNC: 3.1 G/DL (ref 3.5–5)
ANION GAP SERPL CALCULATED.3IONS-SCNC: 11 MMOL/L (ref 5–18)
BUN SERPL-MCNC: 19 MG/DL (ref 8–22)
CALCIUM SERPL-MCNC: 11.3 MG/DL (ref 8.5–10.5)
CHLORIDE BLD-SCNC: 101 MMOL/L (ref 98–107)
CO2 SERPL-SCNC: 30 MMOL/L (ref 22–31)
CREAT SERPL-MCNC: 4.43 MG/DL (ref 0.7–1.3)
ERYTHROCYTE [DISTWIDTH] IN BLOOD BY AUTOMATED COUNT: 17.5 % (ref 10–15)
GFR SERPL CREATININE-BSD FRML MDRD: 17 ML/MIN/1.73M2
GLUCOSE BLD-MCNC: 91 MG/DL (ref 70–125)
HCT VFR BLD AUTO: 33.3 % (ref 40–53)
HGB BLD-MCNC: 10.5 G/DL (ref 13.3–17.7)
MCH RBC QN AUTO: 28 PG (ref 26.5–33)
MCHC RBC AUTO-ENTMCNC: 31.5 G/DL (ref 31.5–36.5)
MCV RBC AUTO: 89 FL (ref 78–100)
PLATELET # BLD AUTO: 187 10E3/UL (ref 150–450)
POTASSIUM BLD-SCNC: 3.9 MMOL/L (ref 3.5–5)
PREALB SERPL IA-MCNC: 16 MG/DL (ref 15–45)
RBC # BLD AUTO: 3.75 10E6/UL (ref 4.4–5.9)
SODIUM SERPL-SCNC: 142 MMOL/L (ref 136–145)
WBC # BLD AUTO: 4.1 10E3/UL (ref 4–11)

## 2022-05-07 PROCEDURE — 84134 ASSAY OF PREALBUMIN: CPT | Mod: ORL | Performed by: FAMILY MEDICINE

## 2022-05-07 PROCEDURE — P9603 ONE-WAY ALLOW PRORATED MILES: HCPCS | Mod: ORL | Performed by: FAMILY MEDICINE

## 2022-05-07 PROCEDURE — 85027 COMPLETE CBC AUTOMATED: CPT | Mod: ORL | Performed by: FAMILY MEDICINE

## 2022-05-07 PROCEDURE — 36415 COLL VENOUS BLD VENIPUNCTURE: CPT | Mod: ORL | Performed by: FAMILY MEDICINE

## 2022-05-07 PROCEDURE — 80048 BASIC METABOLIC PNL TOTAL CA: CPT | Mod: ORL | Performed by: FAMILY MEDICINE

## 2022-05-07 PROCEDURE — 82040 ASSAY OF SERUM ALBUMIN: CPT | Mod: ORL | Performed by: FAMILY MEDICINE

## 2022-05-12 VITALS — BODY MASS INDEX: 22.5 KG/M2 | HEIGHT: 66 IN | WEIGHT: 140 LBS

## 2022-05-12 NOTE — TELEPHONE ENCOUNTER
PCP: patient unsure, does not remember   Referring Provider: self referred  Referring Diagnosis: self referred  Hx: Alcohol use disorder, necrotizing pancreatitis, ESRD    Patient is a poor historian  Patient stated he was hospitalized at [facility], [facility], [facility] and now currently at a long term facility [facility]    I returned patient's call as I had received the message he had called on 5/4/2022 and spoke with Admin, Yamilet Herrera stating he is self referring for a partial liver and kidney transplant. I called patient back and he said he is currently at a long term facility, [facility]. He said he is in need of  a liver and a kidney transplant. I informed him that I had received a referral from his dialysis C in [place] for a kidney transplant and asked him who is referring or recommending he contacts [facility]  for a liver transplant. He said he did not remember who but was told he has liver cirrhosis, he was unsure why but wants to get back in to see gastroenterology here. When asked what caused his kidney failure he said he does not know. He stated he was hospitalized for 5 months here last year after being transferred from [facility]. Forwarding this message to MILI De Luna for review and advice as how to proceed.    1)Do you know why you have liver disease: unsure       If Alcoholic Cirrhosis is present when was your last drink: > 1 year ago       Have you ever been through treatment for alcohol:     Is patient under the age of 65? yes  Is patient diabetic? no  Is patient on insulin? no  Was patient offered a pancreas transplant referral? no    Is patient in a group home/assisted living?Long Term Care Facility  Does patient have a guardian? unsure                               Insurance information:   [employer]             Policy Maldonado: self              Subscriber/Policy/ID Number: [id number]             Group Number: [id number]    Referral intake process completed.  Patient  is aware that after financial approval is received, medical records will be requested.   Patient confirmed for a callback from transplant coordinator on June 1, 2022.  Tentative evaluation date TBD.    Confirmed coordinator will discuss evaluation process in more detail at the time of their call.   Patient is aware of the need to arrange age appropriate cancer screening, vaccinations, and dental care.  Reminded patient to complete questionnaire, complete medical records release, and review packet prior to evaluation visit .  Assessed patient for special needs (ie--wheelchair, assistance, guardian, and ):  wheelchair   Patient instructed to call [phone] with questions.     Patient gave verbal consent during intake call to obtain medical records and documents outside of [facility]:  yes

## 2022-05-25 ENCOUNTER — DOCUMENTATION ONLY (OUTPATIENT)
Dept: TRANSPLANT | Facility: CLINIC | Age: 32
End: 2022-05-25
Payer: COMMERCIAL

## 2022-06-02 NOTE — TELEPHONE ENCOUNTER
June 2, 2022 4:13 PM - Jason Motta, RN:     Referral initial call  MELD:  24  Blood Type: B POS  PCP: Dr. Brianna Tim (in [place]). Dr. Vences at [facility] now.  Referring: self-referred  Nephrologist: Ramez Kirkland DO [facility] [phone] ([phone])  Have you had care at a  Health facility before? Yes  Where do you get care/labs done?    Social Hx: lives in long term care facility, [facility]. Born in [facility], [address].   Work: out of work since Covid. On unemployment. 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  Education: some college.  Family/social support: single, no children. Aunt Danitza Joshua lives in [place], 2ND cousin Aravind Hill possible donor. Many close friends, family lives in [place]. Sister helps out. Mom passed away in March.    Liver DX/History: first told of liver disease: 5/18/2021  Dx: cirrhosis of liver with ascites, ERSRD, pancreatitis.  HE/Meds: Yes HE, taking Lactulose 15mL X2/week, having 1 solid BM/day.  Hematemesis/GIB/Dark stools: no GIB, infrequent BRBPR, no hematemesis.  Colonoscopy/EGD: No colonoscopy, No EGD  Ascites/Paracentesis/Diuretics: Yes ascites, Para s X2, taking torsemide 40mg X1/day  Etoh & Drug use/Legal issues/Chem Dep: last drink 5/2021, no drugs, DUI X1 2018, no chem dep  HCC diagnosis: no diagnosis  Surgeries: shattered elbow in high school, no abdominal surgeries.  LDLT discussed: Yes, discussed in detail. One good potential donor, lives in [place] but will do DASH link when able.    Other Medical hx: ESRD on HD, pleural effusion s/p thoracentesis, AUD c/b necrotizing pancreatitis/chronic pancreatitis, GERD,  Neuro/Strokes/Seizures: none.  Cardiac/MIs, ECHOs/Angios: low BP on dialysis (on Midodrine), pericardial tamponade s/p pericardial window  Lungs/Smoking/O2 use: Hx of asthma, mild ABEBE, quit smoking 5/2021, no O2, hx acute hypoxic respiratory  failure  Kidneys/Dialysis: Yes on dialysis for last year, has chest access, fistula just created.  Cancer/PSA/Mammo/Pap: none  Vaccines/Hepatitis/COVID: Vaccinated for Covid with Moderna, need booster.   Diabetes: No diabetes, chronic severe malnutrition  Dentist: may need teeth extraction.    Plan: Will plan to come to clinic 6/28 (Richard), will email [email or web address] instructions, DASH Link/Living donor information, map of clinic.

## 2022-06-10 ENCOUNTER — TELEPHONE (OUTPATIENT)
Dept: TRANSPLANT | Facility: CLINIC | Age: 32
End: 2022-06-10

## 2022-06-10 NOTE — TELEPHONE ENCOUNTER
Patient Call: General  Route to LPN    Reason for call: Patient called and would like to speak with Coordinator, no other details was left with writer.    Call back needed? Yes    Return Call Needed  Same as documented in contacts section  When to return call?: Greater than one day: Route standard priority

## 2022-06-15 PROBLEM — M62.81 MUSCLE WEAKNESS: Status: ACTIVE | Noted: 2021-11-03

## 2022-06-15 PROBLEM — I31.39 PERICARDIAL EFFUSION: Status: ACTIVE | Noted: 2021-11-03

## 2022-06-15 PROBLEM — K74.60 CIRRHOSIS OF LIVER (H): Status: ACTIVE | Noted: 2021-11-03

## 2022-06-15 PROBLEM — R09.02 HYPOXIA: Status: ACTIVE | Noted: 2022-02-04

## 2022-06-15 PROBLEM — Z99.2 ESRD (END STAGE RENAL DISEASE) ON DIALYSIS (H): Status: ACTIVE | Noted: 2021-11-03

## 2022-06-15 PROBLEM — I31.4 CARDIAC TAMPONADE: Status: ACTIVE | Noted: 2021-10-28

## 2022-06-15 PROBLEM — N18.6 ESRD (END STAGE RENAL DISEASE) ON DIALYSIS (H): Status: ACTIVE | Noted: 2021-11-03

## 2022-06-16 NOTE — TELEPHONE ENCOUNTER
Patient requested his Liver Transplant Evaluation Schedule be emailed to him as he did not have time to review his schedule via the telephone. Liver Transplant Evaluation Schedule emailed to patient via the e-mail address provided by patient.

## 2022-06-27 ENCOUNTER — TRANSFERRED RECORDS (OUTPATIENT)
Dept: HEALTH INFORMATION MANAGEMENT | Facility: CLINIC | Age: 32
End: 2022-06-27

## 2022-06-27 NOTE — PROGRESS NOTES
[facility] Liver Transplant Evaluation    Requesting Provider and Health System: Self referred  Liver Disease: ETOH cirrhosis  Subjective:  First diagnosed with liver disease in May 2021.    Patient stated that he was diagnosed with liver disease in May 2021.  Before that he used to have some abdominal pain and was hospitalized multiple times in [place].  He stated that in May 2021 he was admitted in [place] where he was diagnosed with pancreatitis and alcoholic hepatitis also had end-stage renal disease, started on hemodialysis from there he was transferred to [facility] for further evaluation.  He had necrotizing pancreatitis and underwent multiple endoscopic  necrosectomy's and percutaneous drainage of the large necrotic cavity.    Patient stated that he was discharged in October followed that he was in a rehab facility from October till December and now is in long-term care facility since then.  Patient stated that his mom passed away in March 2022.  After that he had severe mental disturbance where he was not able to remember his passwords and other things.    He is on hemodialysis Monday Wednesday and Friday.  No recent paracentesis.  No melena or hematochezia.    Apart from some memory issues, he does not has hepatic encephalopathy and is not on any medical management for that.    He had PEG-J placement last year for nutrition but has been taken off and now has a tract with some oozing from it.    MELD-Na score: 24 at 6/28/2022  8:39 AM  MELD score: 21 at 6/28/2022  8:39 AM  Calculated from:  Serum Creatinine: 4.47 mg/dL (Using max of 4 mg/dL) at 6/28/2022  8:39 AM  Serum Sodium: 132 mmol/L at 6/28/2022  8:39 AM  Total Bilirubin: 0.8 mg/dL (Using min of 1 mg/dL) at 6/28/2022  8:39 AM  INR(ratio): 1.14 at 6/28/2022  8:39 AM  Age: 31 years        PAST MEDICAL HISTORY  Alcohol-related necrotizing pancreatitis  Infected pseudocyst causing secondary peritonitis  Pancreatic  hydrothorax    PAST SURGICAL HISTORY  Abdominal surgery: No      SOCIAL HISTORY  lives in long term care facility, [facility]. Born in [facility], [address].   Work: out of work since Covid. On unemployment. 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  Education: some college.  Family/social support: single, no children. Aunt Danitza Joshua lives in [place], 2ND cousin Aravind Hill possible donor. Many close friends, family lives in [place]. Sister helps out. Mom passed away in March.    Started alcohol drinking at a very young age, had 2 DUIs in the past 2012 in 2018, alcohol-related treatment      FAMILY HISTORY  Father had colon cancer, mother had lung cancer    ROS: 10 point ROS neg other than the symptoms noted above in the HPI.    EXAM  There were no vitals taken for this visit.  Gen: No acute distress  Head: Normocephalic atraumatic  Eyes: Sclera anicteric  Neck: No cervical lymphadenopathy  Respiratory: Clear to auscultation bilaterally, no overt wheezing or rales  CV: RRR   Abdomen:  Soft, nontender, nondistended, normal bowel sounds  Extrem: No edema  Skin: No jaundice, spider angiomata or palmar erythema.  No rashes.   Neuro: Alert and oriented. No asterixis.    MSK: Grossly Intact  Psych: Normal speech, normal affect      Imaging/Endoscopy  US ABD 6/28/22:  IMPRESSION:   1.  Echogenic liver contour suggesting cirrhosis. No suspicious  hepatic mass.  2.  Patent hepatic vasculature as above.  3.  Moderate ascites.   4.  Increased echogenicity of the kidneys consistent with end-stage  renal disease history.      MELD-Na score: 24 at 6/28/2022  8:39 AM  MELD score: 21 at 6/28/2022  8:39 AM  Calculated from:  Serum Creatinine: 4.47 mg/dL (Using max of 4 mg/dL) at 6/28/2022  8:39 AM  Serum Sodium: 132 mmol/L at 6/28/2022  8:39 AM  Total Bilirubin: 0.8 mg/dL (Using min of 1 mg/dL) at 6/28/2022  8:39 AM  INR(ratio): 1.14 at  6/28/2022  8:39 AM  Age: 31 years      A/P  31 year old male with ETOH  cirrhosis. MELD 24 ABO B    Alcohol related liver disease  Patient has alcoholic liver disease with meld score of 24 based upon his end-stage renal disease/hemodialysis.  His liver function is with total bilirubin of 1.8 and INR of 1.1.  He might need evaluation for kidney transplantation.    Patient should undergo endoscopy on the next available to rule out esophageal varices as an future if he requires transplant evaluation we need to comment upon if he has significant portal hypertension.    -- EGD upon next available  -- F/u in 6 months      SOCIAL SUPPORT. Needs to have any friends or family identified  FUNCTIONAL STATUS. Needs to improve  LIVER TRANSPLANT CANDIDACY. As above      Pt was seen and discussed with Dr. Xavi Weller MD  Hepatology/Liver Transplantation Fellow  [facility]  ========================================================================

## 2022-06-27 NOTE — PROGRESS NOTES
[facility] Solid Organ Transplant  Outpatient MNT: Liver Kidney Transplant Evaluation    Current BMI: 25.6 (HT 67 in,  lbs/74 kg)  BMI is within recommendation of <45 for liver transplant    Fried Frailty-- Frail (4/5 points)- unintentional weight loss, reported exhaustion, reduced , low activity level     FraiLT-- Pre frail   [email or web address]    Recommended Interventions to Address Frailty:  - Include more protein in diet, such as protein supplements, etc  - Work on increasing functional status, mobility, exercise tolerance, etc      Time Spent: 30 minutes  Visit Type: Initial   Referring Physician: Cristhian   Pt accompanied by: self    History of previous txp: self     Nutrition Assessment  Pt reports being on HD- ~1 year; he does get a protein drink there.   He has been in 3 living facilities since hospital discharge. He has been in his current NH since Dec, but is moving into his own apartment this week. He did have a feeding tube, but it was removed a while ago.     - Appetite: reduced, mostly d/t living in NH and dislike of food; tries to cook for himself/asks staff to cook food he purchases (ie steak, vyas, etc) and orders food from take out for majority of his meals.    - N/V/D/C: occasional- not oily, but watery in nature   - Food access concerns: yes    Vitamins, Supplements, Pertinent Meds: renvela, unsure on other vits/Creon listed in chart  Herbal Medicines/Supplements: none   Protein supplement: per dialysis     Edema: minimal fluid retention     Weight hx:   - ubw was 190 lbs-->dropped to 130 d/t illness, now regaining weight  - he reports noticing muscle loss, but has regained ~15% muscle mass in arms    Diet Recall:  - snacks on crackers, tuna, maude butters, chips, PB s/w, pizza rolls  - 8-9 pm main meal: [employer] soup, pizza, chili, mostly take out food (eats half the portion and will save half for later)  - rarely will eat a meal at the NH, instead snacks on  "fruit they provide  - Leticia: 16-32 oz Gatorade/day, 2+ Mountain Dew, 1 ginger ale, 3 pitchers ice, 8-16 oz nickie    Labs 6/28 K 3.5 Phos 4.9     Physical Activity  No routine activity; reports having graduated PT  Uses a walker and is able to walk the halls at the nursing home but avoids this d/t covid  He reports needing special shoes, but hasn't had any follow up on this. He reports foot issues d/t ingrown toe nail, balance, etc. He reports if he uses a walker, he may be able to walk 2 blocks somewhat comfortably, but will need to stop and take a break.   He reports doing the frailty testing really tired him out today     Prior to illness, he worked as a , , working food trucks, etc.     Nutrition Diagnosis  Predicted suboptimal nutrient intake (protein) r/t increased needs w/ liver disease and dialysis.    Nutrition Intervention  Nutrition education provided:  Discussed sodium intake (low sodium foods and drinks, seasoning food without salt and tips for low sodium diet). Pt is consuming a high sodium diet, but hopefully this will change when he is living on his own/cooking for himself. He expresses desire to cook fresher foods, etc.     Reviewed adequate protein intake. Encouraged receiving protein from both animal and plant based sources. Reviewed role of protein intake in both liver/kidney failure. He reports \"I'll get back into protein powder when I get into the gym\". Reviewed role of it regardless of going to the gym.     Reviewed post txp diet guidelines in brief (will review in further detail post txp):  (1) Review of proper food safety measures d/t immunosuppressant therapy post-op and increased risk for food-borne illness    (2) Avoid the following post txp d/t risk for rejection, unknown effects on the organs, and/or potential interactions with immunosuppressants:  - Herbal, Chinese, holistic, chiropractic, natural, alternative medicines and supplements  - Detoxes and cleanses  - Weight loss " pills  - Protein powders or other products with extracts or herbs (ie green tea extract)    (3) Med regimen and possible side effects    Patient Understanding: Pt verbalized understanding of education provided.  Expected Engagement: Fair  Follow-Up Plans: PRN     Nutrition Goals  Include protein supplement once living situation is more stable    Luisa Choudhary, RD, LD, CCTD

## 2022-06-28 ENCOUNTER — OFFICE VISIT (OUTPATIENT)
Dept: TRANSPLANT | Facility: CLINIC | Age: 32
End: 2022-06-28
Attending: INTERNAL MEDICINE
Payer: COMMERCIAL

## 2022-06-28 ENCOUNTER — LAB (OUTPATIENT)
Dept: LAB | Facility: CLINIC | Age: 32
End: 2022-06-28
Attending: INTERNAL MEDICINE
Payer: COMMERCIAL

## 2022-06-28 ENCOUNTER — OFFICE VISIT (OUTPATIENT)
Dept: GASTROENTEROLOGY | Facility: CLINIC | Age: 32
End: 2022-06-28
Attending: INTERNAL MEDICINE
Payer: COMMERCIAL

## 2022-06-28 ENCOUNTER — COMMITTEE REVIEW (OUTPATIENT)
Dept: TRANSPLANT | Facility: CLINIC | Age: 32
End: 2022-06-28

## 2022-06-28 ENCOUNTER — ANCILLARY PROCEDURE (OUTPATIENT)
Dept: ULTRASOUND IMAGING | Facility: CLINIC | Age: 32
End: 2022-06-28
Attending: INTERNAL MEDICINE
Payer: COMMERCIAL

## 2022-06-28 VITALS
HEIGHT: 67 IN | BODY MASS INDEX: 25.74 KG/M2 | WEIGHT: 164 LBS | OXYGEN SATURATION: 97 % | SYSTOLIC BLOOD PRESSURE: 121 MMHG | HEART RATE: 104 BPM | DIASTOLIC BLOOD PRESSURE: 75 MMHG

## 2022-06-28 VITALS
OXYGEN SATURATION: 97 % | SYSTOLIC BLOOD PRESSURE: 121 MMHG | HEART RATE: 104 BPM | DIASTOLIC BLOOD PRESSURE: 75 MMHG | HEIGHT: 67 IN | BODY MASS INDEX: 25.74 KG/M2 | WEIGHT: 164 LBS

## 2022-06-28 DIAGNOSIS — F10.11 HISTORY OF ALCOHOL ABUSE: ICD-10-CM

## 2022-06-28 DIAGNOSIS — K70.31 ALCOHOLIC CIRRHOSIS OF LIVER WITH ASCITES (H): ICD-10-CM

## 2022-06-28 DIAGNOSIS — K74.60 CIRRHOSIS (H): ICD-10-CM

## 2022-06-28 DIAGNOSIS — N18.6 ESRD (END STAGE RENAL DISEASE) (H): ICD-10-CM

## 2022-06-28 DIAGNOSIS — K76.6 PORTAL HYPERTENSION (H): ICD-10-CM

## 2022-06-28 DIAGNOSIS — K70.31 ALCOHOLIC CIRRHOSIS OF LIVER WITH ASCITES (H): Primary | ICD-10-CM

## 2022-06-28 LAB
A1 AB TITR SERPL: 64 {TITER}
A1 AB TITR SERPL: 64 {TITER}
A2 AB TITR SERPL: 8 {TITER}
A2 AB TITR SERPL: 8 {TITER}
ABO/RH(D): NORMAL
ABO/RH(D): NORMAL
AFP SERPL-MCNC: 3.8 NG/ML
ALBUMIN SERPL-MCNC: 3.9 G/DL (ref 3.4–5)
ALBUMIN UR-MCNC: 100 MG/DL
ALP SERPL-CCNC: 118 U/L (ref 40–150)
ALT SERPL W P-5'-P-CCNC: 22 U/L (ref 0–70)
ANION GAP SERPL CALCULATED.3IONS-SCNC: 3 MMOL/L (ref 3–14)
ANTIBODY SCREEN: NEGATIVE
ANTIBODY TITER IGM SCREEN: NEGATIVE
APPEARANCE UR: CLEAR
AST SERPL W P-5'-P-CCNC: 17 U/L (ref 0–45)
BILIRUB DIRECT SERPL-MCNC: 0.3 MG/DL (ref 0–0.2)
BILIRUB SERPL-MCNC: 0.8 MG/DL (ref 0.2–1.3)
BILIRUB UR QL STRIP: NEGATIVE
BUN SERPL-MCNC: 41 MG/DL (ref 7–30)
CALCIUM SERPL-MCNC: 11.5 MG/DL (ref 8.5–10.1)
CHLORIDE BLD-SCNC: 100 MMOL/L (ref 94–109)
CHOLEST SERPL-MCNC: 162 MG/DL
CMV IGG SERPL IA-ACNC: 0.62 U/ML
CMV IGG SERPL IA-ACNC: ABNORMAL
CO2 SERPL-SCNC: 29 MMOL/L (ref 20–32)
COLOR UR AUTO: YELLOW
CREAT SERPL-MCNC: 4.47 MG/DL (ref 0.66–1.25)
CREAT UR-MCNC: 51 MG/DL
DEPRECATED CALCIDIOL+CALCIFEROL SERPL-MC: 81 UG/L (ref 20–75)
EBV VCA IGG SER IA-ACNC: <10 U/ML
EBV VCA IGG SER IA-ACNC: NORMAL
ERYTHROCYTE [DISTWIDTH] IN BLOOD BY AUTOMATED COUNT: 14.5 % (ref 10–15)
FASTING STATUS PATIENT QL REPORTED: NO
FERRITIN SERPL-MCNC: 262 NG/ML (ref 26–388)
GFR SERPL CREATININE-BSD FRML MDRD: 17 ML/MIN/1.73M2
GLUCOSE BLD-MCNC: 74 MG/DL (ref 70–99)
GLUCOSE UR STRIP-MCNC: 50 MG/DL
HAV IGG SER QL IA: NONREACTIVE
HBA1C MFR BLD: 4.6 % (ref 0–5.6)
HBV CORE AB SERPL QL IA: NONREACTIVE
HBV SURFACE AB SERPL IA-ACNC: 20.02 M[IU]/ML
HBV SURFACE AG SERPL QL IA: NONREACTIVE
HCT VFR BLD AUTO: 37.2 % (ref 40–53)
HCV AB SERPL QL IA: NONREACTIVE
HDLC SERPL-MCNC: 62 MG/DL
HGB BLD-MCNC: 11.8 G/DL (ref 13.3–17.7)
HGB UR QL STRIP: ABNORMAL
HIV 1+2 AB+HIV1 P24 AG SERPL QL IA: NONREACTIVE
INR PPP: 1.14 (ref 0.85–1.15)
IRON SATN MFR SERPL: 17 % (ref 15–46)
IRON SERPL-MCNC: 50 UG/DL (ref 35–180)
KETONES UR STRIP-MCNC: NEGATIVE MG/DL
LDLC SERPL CALC-MCNC: 86 MG/DL
LEUKOCYTE ESTERASE UR QL STRIP: NEGATIVE
MCH RBC QN AUTO: 28.3 PG (ref 26.5–33)
MCHC RBC AUTO-ENTMCNC: 31.7 G/DL (ref 31.5–36.5)
MCV RBC AUTO: 89 FL (ref 78–100)
MUCOUS THREADS #/AREA URNS LPF: PRESENT /LPF
NITRATE UR QL: NEGATIVE
NONHDLC SERPL-MCNC: 100 MG/DL
PH UR STRIP: 9 [PH] (ref 5–7)
PHOSPHATE SERPL-MCNC: 4.9 MG/DL (ref 2.5–4.5)
PLATELET # BLD AUTO: 184 10E3/UL (ref 150–450)
POTASSIUM BLD-SCNC: 3.5 MMOL/L (ref 3.4–5.3)
PROT SERPL-MCNC: 9.7 G/DL (ref 6.8–8.8)
PROT UR-MCNC: 1.42 G/L
PROT/CREAT 24H UR: 2.78 G/G CR (ref 0–0.2)
PSA SERPL-MCNC: 0.49 UG/L (ref 0–4)
RBC # BLD AUTO: 4.17 10E6/UL (ref 4.4–5.9)
RBC URINE: 1 /HPF
SODIUM SERPL-SCNC: 132 MMOL/L (ref 133–144)
SP GR UR STRIP: 1.01 (ref 1–1.03)
SPECIMEN EXPIRATION DATE: NORMAL
T PALLIDUM AB SER QL: NONREACTIVE
TIBC SERPL-MCNC: 290 UG/DL (ref 240–430)
TRANSFERRIN SERPL-MCNC: 261 MG/DL (ref 200–360)
TRIGL SERPL-MCNC: 70 MG/DL
TSH SERPL DL<=0.005 MIU/L-ACNC: 1.59 MU/L (ref 0.4–4)
UROBILINOGEN UR STRIP-MCNC: NORMAL MG/DL
WBC # BLD AUTO: 6.5 10E3/UL (ref 4–11)
WBC URINE: 4 /HPF

## 2022-06-28 PROCEDURE — G0103 PSA SCREENING: HCPCS | Performed by: PATHOLOGY

## 2022-06-28 PROCEDURE — 82306 VITAMIN D 25 HYDROXY: CPT | Mod: 90 | Performed by: PATHOLOGY

## 2022-06-28 PROCEDURE — 82105 ALPHA-FETOPROTEIN SERUM: CPT | Mod: 90 | Performed by: PATHOLOGY

## 2022-06-28 PROCEDURE — 86886 COOMBS TEST INDIRECT TITER: CPT | Mod: 90 | Performed by: PATHOLOGY

## 2022-06-28 PROCEDURE — 84466 ASSAY OF TRANSFERRIN: CPT | Mod: 90 | Performed by: PATHOLOGY

## 2022-06-28 PROCEDURE — 86706 HEP B SURFACE ANTIBODY: CPT | Mod: 90 | Performed by: PATHOLOGY

## 2022-06-28 PROCEDURE — 86900 BLOOD TYPING SEROLOGIC ABO: CPT | Mod: 90 | Performed by: PATHOLOGY

## 2022-06-28 PROCEDURE — 36415 COLL VENOUS BLD VENIPUNCTURE: CPT | Performed by: PATHOLOGY

## 2022-06-28 PROCEDURE — 86708 HEPATITIS A ANTIBODY: CPT | Mod: 90 | Performed by: PATHOLOGY

## 2022-06-28 PROCEDURE — 80359 METHYLENEDIOXYAMPHETAMINES: CPT | Mod: 90 | Performed by: PATHOLOGY

## 2022-06-28 PROCEDURE — 86780 TREPONEMA PALLIDUM: CPT | Mod: 90 | Performed by: PATHOLOGY

## 2022-06-28 PROCEDURE — G0480 DRUG TEST DEF 1-7 CLASSES: HCPCS | Mod: 90 | Performed by: PATHOLOGY

## 2022-06-28 PROCEDURE — 99000 SPECIMEN HANDLING OFFICE-LAB: CPT | Performed by: PATHOLOGY

## 2022-06-28 PROCEDURE — 86665 EPSTEIN-BARR CAPSID VCA: CPT | Mod: 90 | Performed by: PATHOLOGY

## 2022-06-28 PROCEDURE — 84443 ASSAY THYROID STIM HORMONE: CPT | Performed by: PATHOLOGY

## 2022-06-28 PROCEDURE — 83036 HEMOGLOBIN GLYCOSYLATED A1C: CPT | Performed by: PATHOLOGY

## 2022-06-28 PROCEDURE — 80324 DRUG SCREEN AMPHETAMINES 1/2: CPT | Mod: 90 | Performed by: PATHOLOGY

## 2022-06-28 PROCEDURE — 80307 DRUG TEST PRSMV CHEM ANLYZR: CPT | Mod: 90 | Performed by: PATHOLOGY

## 2022-06-28 PROCEDURE — 99205 OFFICE O/P NEW HI 60 MIN: CPT | Mod: GC | Performed by: INTERNAL MEDICINE

## 2022-06-28 PROCEDURE — 93975 VASCULAR STUDY: CPT | Performed by: RADIOLOGY

## 2022-06-28 PROCEDURE — 86901 BLOOD TYPING SEROLOGIC RH(D): CPT | Mod: 90 | Performed by: PATHOLOGY

## 2022-06-28 PROCEDURE — 80053 COMPREHEN METABOLIC PANEL: CPT | Performed by: PATHOLOGY

## 2022-06-28 PROCEDURE — 85610 PROTHROMBIN TIME: CPT | Performed by: PATHOLOGY

## 2022-06-28 PROCEDURE — 86704 HEP B CORE ANTIBODY TOTAL: CPT | Mod: 90 | Performed by: PATHOLOGY

## 2022-06-28 PROCEDURE — 82248 BILIRUBIN DIRECT: CPT | Performed by: PATHOLOGY

## 2022-06-28 PROCEDURE — 84100 ASSAY OF PHOSPHORUS: CPT | Performed by: PATHOLOGY

## 2022-06-28 PROCEDURE — 86803 HEPATITIS C AB TEST: CPT | Mod: 90 | Performed by: PATHOLOGY

## 2022-06-28 PROCEDURE — 86850 RBC ANTIBODY SCREEN: CPT | Mod: 90 | Performed by: PATHOLOGY

## 2022-06-28 PROCEDURE — 99203 OFFICE O/P NEW LOW 30 MIN: CPT | Performed by: TRANSPLANT SURGERY

## 2022-06-28 PROCEDURE — 86481 TB AG RESPONSE T-CELL SUSP: CPT | Mod: 90 | Performed by: PATHOLOGY

## 2022-06-28 PROCEDURE — 85027 COMPLETE CBC AUTOMATED: CPT | Performed by: PATHOLOGY

## 2022-06-28 PROCEDURE — 82728 ASSAY OF FERRITIN: CPT | Performed by: PATHOLOGY

## 2022-06-28 PROCEDURE — 86644 CMV ANTIBODY: CPT | Mod: 90 | Performed by: PATHOLOGY

## 2022-06-28 PROCEDURE — 80061 LIPID PANEL: CPT | Performed by: PATHOLOGY

## 2022-06-28 PROCEDURE — 87340 HEPATITIS B SURFACE AG IA: CPT | Mod: 90 | Performed by: PATHOLOGY

## 2022-06-28 RX ORDER — DULOXETIN HYDROCHLORIDE 30 MG/1
30 CAPSULE, DELAYED RELEASE ORAL
COMMUNITY
End: 2022-08-03

## 2022-06-28 RX ORDER — DEXTROAMPHETAMINE SACCHARATE, AMPHETAMINE ASPARTATE MONOHYDRATE, DEXTROAMPHETAMINE SULFATE AND AMPHETAMINE SULFATE 7.5; 7.5; 7.5; 7.5 MG/1; MG/1; MG/1; MG/1
30 CAPSULE, EXTENDED RELEASE ORAL
COMMUNITY
End: 2022-07-07

## 2022-06-28 ASSESSMENT — PAIN SCALES - GENERAL: PAINLEVEL: NO PAIN (0)

## 2022-06-28 NOTE — LETTER
June 30, 2022    Dax Villareal  [address]  [address] [place] [address]    Dear Mr. Villareal,   The purpose of this letter is to let you know that on 6/28/2022 the [facility] Multi-Disciplinary Selection Team reviewed the results of your transplant evaluation.  Based on the results of your evaluation and the selection criteria used by our program, the decision was made to not list you on the liver transplant list at this time.  This is due to multiple reasons including your liver disease improving and the potential of not needing a liver transplant, your lack of insight into the role alcohol has played with your liver disease, the concern over lack of caregiver/social support, and the need to demonstrate sobriety outside of a controlled living environment (skilled nursing facility, hospital, etc).  Important things you should know:    If you would like to discuss the decision, or if your medical status changes you may schedule a return visits with your doctor by calling [phone] and asking to speak to your transplant coordinator.    We recommend that you continue to follow up with your primary care doctor in order to manage your health concerns.  Enclosed is a letter from [employer] which describes the services offered to patients by [facility] and the Organ Procurement and Transplantation Network.  Thank you for allowing us to participate in your care.  We wish you well.  Sincerely,  Jason Motta RN  Pre-Liver Transplant Coordinator  [phone] (direct)    Solid Organ Transplant  [facility]    Enclosures: [employer] Letter  cc: Care Team            The Organ Procurement and Transplantation Network  Toll-free patient services line:     Your resource for organ transplant information    If you have a question regarding your own medical care, you always should call your transplant hospital first. However, for  general organ transplant-related information, you can call the Organ Procurement and Transplantation Network (OPTN) toll-free patient services line at [phone]. Anyone, including potential transplant candidates, candidates, recipients, family members, friends, living donors, and donor family members, can call this number to:          Talk about organ donation, living donation, the transplant process, the donation process, and transplant policies.    Get a free patient information kit with helpful booklets, waiting list and transplant information, and a list of all transplant hospitals.    Ask questions about the OPTN website (https://optn.transplant.hrsa.gov/), the [employer] Organ [employer]aring s ([employer]) website (https://unos.org/), or the [employer] website for living donors and transplant recipients. (https://www.transplantliving.org/).    Learn how the OPTN can help you.    Talk about any concerns that you may have with a transplant hospital.    The [facility] transplant system, the OPTN, is managed under federal contract by the [employer] ([employer]), which is a non-profit charitable organization. The OPTN helps create and define organ sharing policies that make the best use of donated organs. This process continuously evaluating new advances and discoveries so policies can be adapted to best serve patients waiting for transplants. To do so, the OPTN works closely with transplant professionals, transplant patients, transplant candidates, donor families, living donors, and the public. All transplant programs and organ procurement organizations throughout the country are OPTN members and are obligated to follow the policies the OPTN creates for allocating organs.    The OPTN also is responsible for:      Providing educational material for patients, the public, and professionals.    Raising awareness of the need for donated organs and tissue.    Coordinating organ procurement, matching, and  placement.    Collecting information about every organ transplant and donation that occurs in the [place].    Remember, you should contact your transplant hospital directly if you have questions or concerns about your own medical care including medical records, work-up progress, and test results.    We are not your transplant hospital, and our staff will not be able to answer questions about your case, so please keep your transplant hospital s phone number handy.    However, while you research your transplant needs and learn as much as you can about transplantation and donation, we welcome your call to our toll-free patient services line at [phone].          Updated 4/1/2019

## 2022-06-28 NOTE — NURSING NOTE
"Chief Complaint   Patient presents with     Transplant Evaluation     Liver tx eval     Blood pressure 121/75, pulse 104, height 1.702 m (5' 7.01\"), weight 74.4 kg (164 lb), SpO2 97 %.    Yessenia Rucker CMA    "

## 2022-06-28 NOTE — NURSING NOTE
"Chief Complaint   Patient presents with     Transplant Evaluation     Liver tx eval     Blood pressure 121/75, pulse 104, height 1.702 m (5' 7\"), weight 74.4 kg (164 lb), SpO2 97 %.    Yessenia Rucker, VIC    "

## 2022-06-28 NOTE — LETTER
6/28/2022         RE: Dax Villareal  [address]  [address] [place], [place] [id number]        Dear Colleague,    Thank you for referring your patient, Dax Villareal, to the [facility]. Please see a copy of my visit note below.      Assessment and Plan:  1. liver transplant evaluation - patient is a fair candidate overall. Benefits and surgical risks of a liver transplantation were discussed.  2.  End stage liver disease due to Laennec's    Surgical evaluation:  1. Portal Vein:Patent  2. Hepatic Artery: Open  3. TIPS: absent  4. Previous Abdominal Surgery: Yes , h/o necrotizing pancreatitis requiring ex-lap necrosectomy 2021  5. Hepatocellular Carcinoma: None  6. Ascites: Present - minimal- moderate  7. Costal Angle: narrow  8. Portopulmonary Hypertension: absent  9. Hepatopulmonary Syndrome: absent  10. Cardiac Evaluation: needs echocardiogram  11. Nutritional Status: Moderate  12. Diabetes: no  13.Hypertension no  14. Smoker:current 5 cigs/day  14: Fraility index:yes, uses motorized wheelchair but states can walk sig distance, lives in nursing home currently  15. Meets guidelines to receive Living Donor  No  16. Potential Living donors No    Recommendations: 32 y/o male with EtOH related liver disease and ESRD on HD. TB and INR normal with PLT of 184.  Prior episode of necrotizing pancreatitis requiring X-Lap necrosectomy 2021. Unclear if he needs a liver transplant now with abstinence.  Although his MELD is calculated at 24 and he is a blood type B, if calculated with a normal Cr it is 7 and with a plt count of 187 may need to consider for kidney alone      Patients overall evaluation will be discussed at the Liver Transplant selection committee meeting with a final recommendation on the patients suitability for transplant to be made at that time.    Consult Full  Details:  Dax Villareal was seen in consultation at the request of Dr. Hurley for evaluation as a potential liver  transplant recipient.    Reason for Visit:  Dax Villareal is a 31 year old year old male with Laennec's, who presents for liver transplant evaluation.    HPI:  Presenting complaint: Abdominal distention    32 y/o male with EtOH liver disease with ESRD on HD, 1st diagnosed with liver disease in 05/2021, still makes urine, dialyzing MWF.  2020 developed pancreatitis that ultimately progressed to necrotizing pancreatitis requiring necrosectomy 07/2021.  He relates that he was quite ill around that time and helped prompt him to stop drinking, still has problems with emesis but does not look too nutritionally depleted.  Denies any previous issues with gi bleeding but has had issues with ascites (although no recent para) and mild HE    PMH  Cirrhosis EtOH  ESRD on HD MWF  Necrotizing Pancreatitis    PSH  X-Lap necrosectomy 07/2021    Soc  -Currently living in a nursing home but looking to move to a 1 bedroom apartment in [facility]  EtOH- previous heavy, quit 05/2021  Tob still smoking 5 cigs/day  ? Support        Past Medical History:   Diagnosis Date     ADHD 2008     Past Surgical History:   Procedure Laterality Date     COMBINED ESOPHAGOSCOPY, GASTROSCOPY, DUODENOSCOPY (EGD), TISSUE APPOSI N/A 9/20/2021    Procedure: ESOPHAGOGASTRODUODENOSCOPY WITH SUTURE FISTULA CLOSURE, argon plasma coagulation;  Surgeon: Jose Quigley MD;  Location: [facility]     ENDOSCOPIC INSERTION TUBE GASTROSTOMY N/A 7/13/2021    Procedure: Upper endoscopy, INSERTION, PEG-J TUBE and Gastropexy;  Surgeon: Rayshawn Will MD;  Location: [facility]     ENDOSCOPIC INSERTION TUBE GASTROSTOMY  9/13/2021    Procedure: PEG/J PLACEMENT.;  Surgeon: Rayshawn Will MD;  Location: [facility]     ENDOSCOPIC ULTRASOUND UPPER GASTROINTESTINAL TRACT (GI) N/A 7/21/2021    Procedure: ENDOSCOPIC ULTRASOUND, ESOPHAGOSCOPY / UPPER GASTROINTESTINAL TRACT (GI) with cyst gastrostomy, dilation;  Surgeon: Guru Yecenia Chacko MD;  Location: [facility]      ENDOSCOPIC ULTRASOUND, ESOPHAGOSCOPY, GASTROSCOPY, DUODENOSCOPY (EGD), NECROSECTOMY N/A 8/11/2021    Procedure: ESOPHAGOGASTRODUODENOSCOPY (EGD) with necrosectomy, stent exchange.;  Surgeon: Amadou Beasley MD;  [facility]: [facility]     ESOPHAGOSCOPY, GASTROSCOPY, DUODENOSCOPY (EGD), COMBINED N/A 7/28/2021    Procedure: ESOPHAGOGASTRODUODENOSCOPY (EGD), gastrostomy apposition, Necrosectomy, stent exchange.;  Surgeon: Rayshawn Will MD;  [facility]: [facility]     ESOPHAGOSCOPY, GASTROSCOPY, DUODENOSCOPY (EGD), COMBINED N/A 8/6/2021    Procedure: ESOPHAGOGASTRODUODENOSCOPY (EGD) with necrosectomy, and stents exchanged;  Surgeon: Jose Quigley MD;  [facility]     ESOPHAGOSCOPY, GASTROSCOPY, DUODENOSCOPY (EGD), COMBINED Left 9/13/2021    Procedure: ESOPHAGOGASTRODUODENOSCOPY (EGD), GASTROSTOMY TUBE REMOVAL, FISTULAR CLOSURE ENDOSCOPIC.;  Surgeon: Rayshawn Will MD;  Location: [facility]     IR ABSCESS TUBE CHANGE  7/14/2021     IR PARACENTESIS  7/21/2021     IR SINOGRAM INJECTION DIAGNOSTIC  8/25/2021     ORTHOPEDIC SURGERY Right     fracture repair     REPLACE GASTROSTOMY TUBE, PERCUTANEOUS N/A 7/28/2021    Procedure: gastro-jejunostomy tube exchange;  Surgeon: Rayshawn Will MD;  Location: [facility]     Past Surgical History:   Procedure Laterality Date     COMBINED ESOPHAGOSCOPY, GASTROSCOPY, DUODENOSCOPY (EGD), TISSUE APPOSI N/A 9/20/2021    Procedure: ESOPHAGOGASTRODUODENOSCOPY WITH SUTURE FISTULA CLOSURE, argon plasma coagulation;  Surgeon: Jose Quigley MD;  [facility]     ENDOSCOPIC INSERTION TUBE GASTROSTOMY N/A 7/13/2021    Procedure: Upper endoscopy, INSERTION, PEG-J TUBE and Gastropexy;  Surgeon: Rayshawn Will MD;  [facility]     ENDOSCOPIC INSERTION TUBE GASTROSTOMY  9/13/2021    Procedure: PEG/J PLACEMENT.;  Surgeon: Rayshawn Will MD;  [facility]     ENDOSCOPIC ULTRASOUND UPPER GASTROINTESTINAL TRACT (GI) N/A 7/21/2021    Procedure: ENDOSCOPIC ULTRASOUND,  ESOPHAGOSCOPY / UPPER GASTROINTESTINAL TRACT (GI) with cyst gastrostomy, dilation;  Surgeon: Guru Yecenia Chacko MD;  [facility]     ENDOSCOPIC ULTRASOUND, ESOPHAGOSCOPY, GASTROSCOPY, DUODENOSCOPY (EGD), NECROSECTOMY N/A 8/11/2021    Procedure: ESOPHAGOGASTRODUODENOSCOPY (EGD) with necrosectomy, stent exchange.;  Surgeon: Amadou Beasley MD;  [facility]     ESOPHAGOSCOPY, GASTROSCOPY, DUODENOSCOPY (EGD), COMBINED N/A 7/28/2021    Procedure: ESOPHAGOGASTRODUODENOSCOPY (EGD), gastrostomy apposition, Necrosectomy, stent exchange.;  Surgeon: Rayshawn Will MD;  [facility]     ESOPHAGOSCOPY, GASTROSCOPY, DUODENOSCOPY (EGD), COMBINED N/A 8/6/2021    Procedure: ESOPHAGOGASTRODUODENOSCOPY (EGD) with necrosectomy, and stents exchanged;  Surgeon: Jose Quigley MD;  [facility]     ESOPHAGOSCOPY, GASTROSCOPY, DUODENOSCOPY (EGD), COMBINED Left 9/13/2021    Procedure: ESOPHAGOGASTRODUODENOSCOPY (EGD), GASTROSTOMY TUBE REMOVAL, FISTULAR CLOSURE ENDOSCOPIC.;  Surgeon: Rayshawn Will MD;  [facility]     IR ABSCESS TUBE CHANGE  7/14/2021     IR PARACENTESIS  7/21/2021     IR SINOGRAM INJECTION DIAGNOSTIC  8/25/2021     ORTHOPEDIC SURGERY Right     fracture repair     REPLACE GASTROSTOMY TUBE, PERCUTANEOUS N/A 7/28/2021    Procedure: gastro-jejunostomy tube exchange;  Surgeon: Rayshawn Will MD;  Location: [facility]     Family History   Problem Relation Age of Onset     Attention Deficit Disorder Mother      Alcoholism Mother      Alcoholism Father      Alcoholism Brother      Alcoholism Sister      Allergies   Allergen Reactions     Tylenol [Acetaminophen] Itching     Prior to Admission medications    Medication Sig Start Date End Date Taking? Authorizing Provider   acetaminophen (TYLENOL) 325 MG/10.15ML solution 10.15 mLs (325 mg) by Oral or PEGJ tube route every 4 hours as needed for mild pain or fever 10/12/21  Yes Alejandro Duncan MD   amylase-lipase-protease  (CREON 24) 76084-62523 units CPEP per EC capsule 2 capsules by Per G Tube route 3 times daily (with meals) 10/12/21  Yes Alejandro Duncan MD   amylase-lipase-protease (CREON 24) 16974-63315 units CPEP per EC capsule 1-2 capsules by Per Feeding Tube route every 4 hours 10/12/21  Yes Marleny Monk MD   Cetaphil Moisturizing (CETAPHIL) external lotion Apply topically 2 times daily as needed for dry skin or irritation (Apply to chest) 10/12/21  Yes Alejandro Duncan MD   folic acid (FOLVITE) 1 MG tablet 1 tablet (1 mg) by Oral or Feeding Tube route daily 10/13/21  Yes Alejandro Duncan MD   guaiFENesin (ROBITUSSIN) 20 mg/mL SOLN solution Take 10 mLs by mouth every 4 hours as needed for cough 10/12/21  Yes Alejandro Duncan MD   Guar Gum (FIBER MODULAR, NUTRISOURCE FIBER,) packet 1 packet by Per Feeding Tube route 3 times daily 10/12/21  Yes Alejandro Duncan MD   melatonin 1 MG/ML LIQD liquid 6 mLs (6 mg) by Oral or Feeding Tube route daily (with dinner) 10/12/21  Yes Alejandro Duncan MD   midodrine (PROAMATINE) 5 MG tablet 3 tablets (15 mg) by Per Feeding Tube route 3 times daily 10/12/21  Yes Alejandro Duncan MD   ondansetron (ZOFRAN) 4 MG tablet 1 tablet (4 mg) by Per G Tube route every 8 hours as needed for nausea or vomiting 10/12/21  Yes Alejandro Duncan MD   pantoprazole (PROTONIX) 2 mg/mL SUSP suspension 20 mLs (40 mg) by Oral or Feeding Tube route every morning (before breakfast) 10/13/21  Yes Alejandro Duncan MD   potassium chloride (KLOR-CON) 20 MEQ packet 20 mEq by Per Feeding Tube route daily 10/13/21  Yes Alejandro Duncan MD   prochlorperazine (COMPAZINE) 5 MG tablet Take 1 tablet (5 mg) by mouth every 6 hours as needed for nausea or vomiting 10/12/21  Yes Alejandro Duncan MD   protein modular (PROSOURCE TF FREE) LIQD 1 packet by Per Feeding Tube route daily 10/13/21  Yes Alejandro Duncan MD   sevelamer carbonate, RENVELA, 0.8 GM PACK Packet 2 packets (1.6 g) by Oral or Feeding Tube route 3  times daily 10/12/21  Yes Alejandro Duncan MD   silver nitrate (ARZOL) 75-25 % miscellaneous Apply topically daily as needed for wound care 10/12/21  Yes Alejandro Duncan MD   sodium bicarbonate 325 MG tablet 1 tablet (325 mg) by Per Feeding Tube route every 4 hours 10/12/21  Yes Alejandro Duncan MD   thiamine (B-1) 100 MG tablet 1 tablet (100 mg) by Oral or Feeding Tube route daily 10/13/21  Yes Alejandro Duncan MD   zinc oxide (DESITIN) 40 % external ointment Apply topically every hour as needed for irritation 10/12/21  Yes Alejandro Duncan MD   ALPRAZolam (XANAX) 0.5 MG tablet Take 0.5 mg by mouth    Reported, Patient   amphetamine-dextroamphetamine (ADDERALL XR) 30 MG 24 hr capsule Take 1 capsule (30 mg) by mouth every morning 7/7/22   Kole Gomez MD   Buprenorphine HCl (BELBUCA) 150 MCG FILM buccal film Place 1 Film (150 mcg) inside cheek every 12 hours 7/7/22   Kole Gomez MD   Dextroamphetamine Sulfate 20 MG TABS Take 20 mg by mouth daily at 2 pm One tab at 2 pm 7/7/22   Kole Gomez MD   DULoxetine (CYMBALTA) 30 MG capsule Take 30 mg by mouth    Reported, Patient   gabapentin (NEURONTIN) 300 MG capsule One tab at bedtime for 3 days, then one bid 7/7/22   Kole Gomez MD   hydrOXYzine (ATARAX) 25 MG tablet 2 tablets (50 mg) by Oral or Feeding Tube route nightly as needed for anxiety or other (adjuvant pain) 7/7/22   Kole Gomez MD   lactulose (CHRONULAC) 10 GM/15ML solution Take 20 g by mouth 12/16/21   Reported, Patient   rOPINIRole (REQUIP) 0.25 MG tablet Take 0.25 mg by mouth 12/16/21   Reported, Patient   torsemide (DEMADEX) 20 MG tablet Take 40 mg by mouth 12/16/21   Reported, Patient   vitamin (B COMPLEX) capsule Take 1 each by mouth 12/16/21   Reported, Patient       Previous Transplant Hx: No    Cardiovascular Hx:       h/o Cardiac Issues: No       Exercise Tolerance: no chest pain or shortness of breath with exertion.    Potential Donor(s): No    ROS:    REVIEW OF SYSTEMS  "(check box if normal)  [x]                GENERAL  [x]                  PULMONARY [x]                 GENITOURINARY  [x]                 CNS                 [x]                  CARDIAC  [x]                  ENDOCRINE  [x]                 EARS,NOSE,THROAT [x]                  GASTROINTESTINAL [x]                  NEUROLOGIC    [x]                 MUSCLOSKELTAL  [x]                   HEMATOLOGY    Examination:     Vitals:  /75   Pulse 104   Ht 1.702 m (5' 7\")   Wt 74.4 kg (164 lb)   SpO2 97%   BMI 25.69 kg/m      GENERAL APPEARANCE: alert and no distress  EYES: PERRL  HENT: mouth without ulcers or lesions  NECK: supple, no adenopathy  RESP: lungs clear to auscultation - no rales, rhonchi or wheezes  CV: regular rhythm, normal rate, no rub   ABDOMEN:  soft, nontender, no HSM or masses and bowel sounds normal  MS: extremities normal- no gross deformities noted, no evidence of inflammation in joints, no muscle tenderness  SKIN: no rash  NEURO: Normal strength and tone, sensory exam grossly normal, mentation intact and speech normal  PSYCH: mentation appears normal. and affect normal/bright    I had a long discussion with the patient regarding liver transplantation which included but was not limited to  the following points:    1. Liver transplant selection committee process.  2. The federal rules for cadaveric waiting list, the size and blood type matching of the organ. The availability of living-related donor transplantation.  3. The types of donors: brain death donors, non-heart beating donors, partial liver grafts: splits and living donor grafts  4. Extended criteria  Donors (older age, steasosis) and the increased  risk of primary non-function using the extended criteria donors  5. The CDC high risk donors,  Risk of donor transmitted infections and donor transmitted malignancy  6. The liver transplant operation and the associated risks and technical complications which can include intraoperative death, " post operative death,  Primary non-function, bleeding requiring re-operations, arterial and biliary complications, bowel perforations, and intra abdominal abscess. Some of these complicaitons may require a second operation.  7. The postoperative course, the ICU stay and risk of postoperative complications which can include sepsis, MI, stroke, brain injury, pneumonia, pleural effusions, and renal dysfunction.  8. The current 1 year and 5 year graft and patient survivals.  9. The need for life long immunosuppressive therapy and the side effects of these medications, including the possibility of toxicity, opportunistic infections, risk of cancer including lymphoma, and the possibility of rejection even if the patient is taking the medication exactly as prescribed.  10. The need for compliance with medications and follow-up visits in the clinic and thereafter.  11. The patient and family understand these risks and wish to proceed to transplantation           Again, thank you for allowing me to participate in the care of your patient.        Sincerely,        Harpreet Morrow MD

## 2022-06-28 NOTE — PROGRESS NOTES
Psychosocial Assessment  Dax Villareal was seen in the Transplant Center as part of his evaluation as a potential liver transplant recipient.  He presented alone to the appointment.   Living Situation: Dax currently resides in [facility]. He's resided in a hospital or care facility for the past one year. Prior that he reports moving around frequently with family, significant others, and friends. He is planning to move into an apartment in [place], [place] and has a section 8 voucher.   2021-10/12/2021 -  [facility]  10/12/2021 - 2021 - [facility]   2021 - Present  - [facility].   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 Dax discontinued work due to a combination of his health and COVID-19. He is not a .   Financial /Income: Dax does not have a source of income at this time. He reports that he was approved for social security disability and has not received his first payment. Dax reports that he has student loan debt. After his social security disability payments begin, he will not have any concerns.   Health Insurance: Dax has [employer] PMAP. This writer talked with Dax about the financial risks of transplant, particularly about the high cost of transplant related medications and the importance of maintaining adequate health insurance coverage.  Family/Social Support: Dax is single and does not have any children. He has two living siblings, Noni ([place], [place]), and Dev ([place], [place]). Both parents are . He reports that his aunt Danitza who lives in [place] and an Aunt/Uncle who may be able to assist. Dax has many friends who are supportive.    This writer stressed the importance of having a stable and involved support network before and after transplant.  Provided Dax  with education about the relationship between a stable support system and better surgical and post-transplant  outcomes compared to patients with a limited support system.    Functional Status: Dax indicates that he is able to walk two blocks. He uses a motorized scooter in clinic today. He currently completes all his IADLs. Dax's SNF assists with medication management.   Chemical Dependency:  Dax has a history of misuse/overuse of alcohol, with his last use May 2021. Prior to May 2021, he would consume alcohol 5-7 days a week. A 1.75L would last him 3-5 days depending on the week. Per patient's report and medical record, Dax has numerous alcohol related admissions prior to 2021. Dax smokes 5-8 cigarette's per day, and voiced understanding towards recommendation to discontinue use. No history of overuse/misuse of pain medication or other substances. Dax has some recreational cannabis use in his teens.   Dax has two DUIs (2012 and 2018). No history of treatment.   Mental Health: Dax reports a history of anxiety. He indicated that he is seeking a PCP to help adjust medication. No history of psychotherapy, psychiatric hospitalization or past/current suicidal thoughts.   DIMITRI-7: 10  PHQ-9: 9   Adjustment to Illness:  This writer provided Dax  with supportive counseling throughout this interview.  This writer also encouraged Dax to attend the liver transplant support group for additional support and encouragement.   Impression/Recommendations:   Dax verbalizes understanding the psychosocial risks of transplant and teaching provided during this evaluation.  Dax has met the sobriety criteria recommended for listing, however this writer recommends Dax complete a chemical dependency evaluation and any treatment recommendations before being listed for transplant. Today, Dxa presented with poor insight into his liver disease. When asked the cause and/or contributing factors to his liver disease, he attributes it to pop, energy drinks and zantac. Dax  minimized his alcohol use and faulted other health systems and providers  for his current situation. PEth pending.  Dax has been living in a hospital setting or SNF for the last year. It would be beneficial to monitor's Dax's stability in terms of sobriety, medication compliance, following medical directives and housing.   I recommended that he establishes care with a mental health provider as he reported significant anxiety. He is open to referral to Dr. Kun Pelayo. Referral initiated.   At this time, he does not have a caregiver plan. I asked him to identify a primary caregiver and have them call me.   Dax has adequate health insurance for transplant.   This writer will remain available to assist patient throughout the evaluation process and will follow patient through transplant if he is listed.  It was a pleasure to evaluate this patient for liver transplant.   Teaching completed during assessment:  1.     Housing and relocation needs post transplant.  2.     Caregiver needs post transplant.  3.     Financial issues related to transplant.  4.     Risks of alcohol use post transplant.  5.     Common psychosocial stressors pre/post transplant.        6.     Liver Transplant support group availability.        7.     [facility] Directive             Psychosocial Risks of Transplant Reviewed:  1.     Increased stress related to your emotional, family, social, employment, or   financial situation.  2.     Affect on work and/or disability benefits.  3.     Affect on future health and life insurance.  4.     Transplant outcome expectations may not be met.  5.     Mental Health risks: anxiety, depression, PTSD, guilt, grief and chronic fatigue.     Tootie Andrew, MSW, LICSW

## 2022-06-28 NOTE — LETTER
6/28/2022         RE: Dax Villareal  [address]  [address] [place], [place] [id number]        Dear Colleague,    Thank you for referring your patient, Dax Villareal, to the [facility] [place]. Please see a copy of my visit note below.    [facility] Liver Transplant Evaluation    Requesting Provider and Health System: Self referred  Liver Disease: ETOH cirrhosis  Subjective:  First diagnosed with liver disease in May 2021.    Patient stated that he was diagnosed with liver disease in May 2021.  Before that he used to have some abdominal pain and was hospitalized multiple times in [place].  He stated that in May 2021 he was admitted in [place] where he was diagnosed with pancreatitis and alcoholic hepatitis also had end-stage renal disease, started on hemodialysis from there he was transferred to [facility] for further evaluation.  He had necrotizing pancreatitis and underwent multiple endoscopic  necrosectomy's and percutaneous drainage of the large necrotic cavity.    Patient stated that he was discharged in October followed that he was in a rehab facility from October till December and now is in long-term care facility since then.  Patient stated that his mom passed away in March 2022.  After that he had severe mental disturbance where he was not able to remember his passwords and other things.    He is on hemodialysis Monday Wednesday and Friday.  No recent paracentesis.  No melena or hematochezia.    Apart from some memory issues, he does not has hepatic encephalopathy and is not on any medical management for that.    He had PEG-J placement last year for nutrition but has been taken off and now has a tract with some oozing from it.    MELD-Na score: 24 at 6/28/2022  8:39 AM  MELD score: 21 at 6/28/2022  8:39 AM  Calculated from:  Serum Creatinine: 4.47 mg/dL (Using max of 4 mg/dL) at 6/28/2022  8:39 AM  Serum Sodium: 132 mmol/L at 6/28/2022  8:39  AM  Total Bilirubin: 0.8 mg/dL (Using min of 1 mg/dL) at 6/28/2022  8:39 AM  INR(ratio): 1.14 at 6/28/2022  8:39 AM  Age: 31 years        PAST MEDICAL HISTORY  Alcohol-related necrotizing pancreatitis  Infected pseudocyst causing secondary peritonitis  Pancreatic hydrothorax    PAST SURGICAL HISTORY  Abdominal surgery: No      SOCIAL HISTORY  lives in long term care facility, [facility]. Born in [facility], [facility] & [place].   Work: out of work since Covid. On unemployment. Used to work in service industry (dishwashing, security, lead-, [employer] food trucks). Will be on disability soon.  Education: some college.  Family/social support: single, no children. Aunt Danitza Joshua lives in [place], 2ND cousin Aravind Hill possible donor. Many close friends, family lives in [place]. Sister helps out. Mom passed away in March.    Started alcohol drinking at a very young age, had 2 DUIs in the past 2012 in 2018, alcohol-related treatment      FAMILY HISTORY  Father had colon cancer, mother had lung cancer    ROS: 10 point ROS neg other than the symptoms noted above in the HPI.    EXAM  There were no vitals taken for this visit.  Gen: No acute distress  Head: Normocephalic atraumatic  Eyes: Sclera anicteric  Neck: No cervical lymphadenopathy  Respiratory: Clear to auscultation bilaterally, no overt wheezing or rales  CV: RRR   Abdomen:  Soft, nontender, nondistended, normal bowel sounds  Extrem: No edema  Skin: No jaundice, spider angiomata or palmar erythema.  No rashes.   Neuro: Alert and oriented. No asterixis.    MSK: Grossly Intact  Psych: Normal speech, normal affect      Imaging/Endoscopy  US ABD 6/28/22:  IMPRESSION:   1.  Echogenic liver contour suggesting cirrhosis. No suspicious  hepatic mass.  2.  Patent hepatic vasculature as above.  3.  Moderate ascites.   4.  Increased echogenicity of the kidneys consistent with end-stage  renal disease history.      MELD-Na score: 24 at 6/28/2022  8:39 AM  MELD  score: 21 at 6/28/2022  8:39 AM  Calculated from:  Serum Creatinine: 4.47 mg/dL (Using max of 4 mg/dL) at 6/28/2022  8:39 AM  Serum Sodium: 132 mmol/L at 6/28/2022  8:39 AM  Total Bilirubin: 0.8 mg/dL (Using min of 1 mg/dL) at 6/28/2022  8:39 AM  INR(ratio): 1.14 at 6/28/2022  8:39 AM  Age: 31 years      A/P  31 year old male with ETOH  cirrhosis. MELD 24 ABO B    Alcohol related liver disease  Patient has alcoholic liver disease with meld score of 24 based upon his end-stage renal disease/hemodialysis.  His liver function is with total bilirubin of 1.8 and INR of 1.1.  He might need evaluation for kidney transplantation.    Patient should undergo endoscopy on the next available to rule out esophageal varices as an future if he requires transplant evaluation we need to comment upon if he has significant portal hypertension.    -- EGD upon next available  -- F/u in 6 months      SOCIAL SUPPORT. Needs to have any friends or family identified  FUNCTIONAL STATUS. Needs to improve  LIVER TRANSPLANT CANDIDACY. As above      Pt was seen and discussed with Dr. Xavi Weller MD  Hepatology/Liver Transplantation Fellow  [facility]  ========================================================================              Attestation signed by Zoltan Hurley MD at 7/1/2022  9:55 AM:  The patient was seen and examined.  The above assessment and plan was developed jointly with the fellow.      Thank you very much for allowing me to participate in the care of this patient.  If you have any questions regarding my recommendations, please do not hesitate to contact me.         Zoltan Hurley MD      Professor of Medicine  [employer]      Executive Medical Director, Solid Organ Transplant Program  [facility]

## 2022-06-28 NOTE — COMMITTEE REVIEW
Abdominal Committee Review Note     Evaluation Date: 6/28/2022  Committee Review Date: 6/28/2022    Organ being evaluated for: Liver    Transplant Phase: Evaluation  Transplant Status: Active    Transplant Coordinator: Jason Motta  Transplant Surgeon:       Referring Physician: Referred Self    Primary Diagnosis:   Secondary Diagnosis:     Committee Review Members:  Nutrition Luisa Choudhary, RD   Pharmacist Wilfredo Zambrano, [facility]    - Clinical Uriah Hussein, RONNA, Tootie Andrew, [facility]   Transplant Joseline Du, ROB, Christina Celis, RN, Rosie Nunn, RN, RHEA FLORES, APRN CNP, Juan Alberto Godwin MD, Jr Geoff Parker, ROB, Jennyfer Shaffer MD, Kartik Velasquez, [facility], Ute Nascimento, ROB, Ute Ríos, NP, Makayla Glass, APRN CNP, Fernando Reeves MD, Jason Motta, ROB, Ceferino Weller MD, Kirt Ramos MD   Transplant Hepatology  Zoltan Hurley MD, Charmaine Singleton MD, Thomas M. Leventhal, MD   Transplant Surgery VENUS Gaitan-C, PA-C, Cipriano Arteaga MD       Transplant Eligibility: Cirrhosis with MELD, ETOH    Committee Review Decision: Declined    Relative Contraindications:     Absolute Contraindications:     Committee Chair Zoltan Hurley MD verbally attested to the committee's decision.    Committee Discussion Details: Close liver eval. Will place orders for PCP referral, GI referral, and endoscopy. Per Liver transplant team, okay to proceed with kidney transplant evaluation.    June 29, 2022 4:14 PM - Jason Motta RN:   Addendum: Patient called and updated on closing of liver evaluation, plan to proceed with transplant kidney evaluation.

## 2022-06-29 LAB
ETHYL GLUCURONIDE UR QL SCN: NEGATIVE NG/ML
GAMMA INTERFERON BACKGROUND BLD IA-ACNC: 0.03 IU/ML
M TB IFN-G BLD-IMP: NEGATIVE
M TB IFN-G CD4+ BCKGRND COR BLD-ACNC: 9.97 IU/ML
MITOGEN IGNF BCKGRD COR BLD-ACNC: 0 IU/ML
MITOGEN IGNF BCKGRD COR BLD-ACNC: 0.01 IU/ML
QUANTIFERON MITOGEN: 10 IU/ML
QUANTIFERON NIL TUBE: 0.03 IU/ML
QUANTIFERON TB1 TUBE: 0.03 IU/ML
QUANTIFERON TB2 TUBE: 0.04

## 2022-06-30 LAB
AMPHETAMINES SERPL QL SCN: POSITIVE NG/ML
ANNOTATION COMMENT IMP: NORMAL
BARBITURATES SERPL QL SCN: NEGATIVE NG/ML
BENZODIAZ SERPL QL SCN: NEGATIVE NG/ML
BUPRENORPHINE SERPL-MCNC: NEGATIVE NG/ML
CANNABINOIDS SERPL QL SCN: POSITIVE NG/ML
COCAINE SERPL QL SCN: NEGATIVE NG/ML
METHADONE SERPL QL SCN: NEGATIVE NG/ML
METHAMPHET SERPL QL: NEGATIVE NG/ML
OPIATES SERPL QL SCN: NEGATIVE NG/ML
OXYCODONE SERPL QL: NEGATIVE NG/ML
PCP SERPL QL SCN: NEGATIVE NG/ML
PLPETH BLD-MCNC: <10 NG/ML
POPETH BLD-MCNC: <10 NG/ML

## 2022-07-03 LAB
AMPHET SERPL-MCNC: 136 NG/ML
CANNABINOIDS SERPL-MCNC: <5 NG/ML
MDA SERPL-MCNC: <20 NG/ML
MDEA SERPL-MCNC: <20 NG/ML
MDMA SERPL-MCNC: <20 NG/ML
METHAMPHET SERPL-MCNC: <20 NG/ML

## 2022-07-05 ENCOUNTER — HOSPITAL ENCOUNTER (OUTPATIENT)
Dept: CARDIOLOGY | Facility: CLINIC | Age: 32
Discharge: HOME | End: 2022-07-05
Attending: INTERNAL MEDICINE
Payer: COMMERCIAL

## 2022-07-05 VITALS — DIASTOLIC BLOOD PRESSURE: 55 MMHG | HEART RATE: 66 BPM | SYSTOLIC BLOOD PRESSURE: 101 MMHG

## 2022-07-05 DIAGNOSIS — F10.11 HISTORY OF ALCOHOL ABUSE: ICD-10-CM

## 2022-07-05 DIAGNOSIS — K74.60 CIRRHOSIS (H): ICD-10-CM

## 2022-07-05 DIAGNOSIS — N18.6 ESRD (END STAGE RENAL DISEASE) (H): ICD-10-CM

## 2022-07-05 DIAGNOSIS — K76.6 PORTAL HYPERTENSION (H): ICD-10-CM

## 2022-07-05 LAB
CREAT BLD-MCNC: 3.9 MG/DL (ref 0.7–1.3)
GFR SERPL CREATININE-BSD FRML MDRD: 20 ML/MIN/1.73M2

## 2022-07-05 PROCEDURE — 250N000013 HC RX MED GY IP 250 OP 250 PS 637: Performed by: INTERNAL MEDICINE

## 2022-07-05 PROCEDURE — 250N000011 HC RX IP 250 OP 636: Performed by: INTERNAL MEDICINE

## 2022-07-05 PROCEDURE — 250N000009 HC RX 250: Performed by: INTERNAL MEDICINE

## 2022-07-05 PROCEDURE — 75574 CT ANGIO HRT W/3D IMAGE: CPT | Mod: 26 | Performed by: INTERNAL MEDICINE

## 2022-07-05 PROCEDURE — 82565 ASSAY OF CREATININE: CPT

## 2022-07-05 PROCEDURE — 75574 CT ANGIO HRT W/3D IMAGE: CPT

## 2022-07-05 RX ORDER — IOPAMIDOL 755 MG/ML
500 INJECTION, SOLUTION INTRAVASCULAR ONCE
Status: COMPLETED | OUTPATIENT
Start: 2022-07-05 | End: 2022-07-05

## 2022-07-05 RX ORDER — METOPROLOL TARTRATE 25 MG/1
25-100 TABLET, FILM COATED ORAL
Status: COMPLETED | OUTPATIENT
Start: 2022-07-05 | End: 2022-07-05

## 2022-07-05 RX ORDER — NITROGLYCERIN 0.4 MG/1
0.4 TABLET SUBLINGUAL
Status: DISCONTINUED | OUTPATIENT
Start: 2022-07-05 | End: 2022-07-06 | Stop reason: HOSPADM

## 2022-07-05 RX ORDER — METHYLPREDNISOLONE SODIUM SUCCINATE 125 MG/2ML
125 INJECTION, POWDER, LYOPHILIZED, FOR SOLUTION INTRAMUSCULAR; INTRAVENOUS
Status: DISCONTINUED | OUTPATIENT
Start: 2022-07-05 | End: 2022-07-06 | Stop reason: HOSPADM

## 2022-07-05 RX ORDER — DIPHENHYDRAMINE HCL 25 MG
25 CAPSULE ORAL
Status: DISCONTINUED | OUTPATIENT
Start: 2022-07-05 | End: 2022-07-06 | Stop reason: HOSPADM

## 2022-07-05 RX ORDER — DILTIAZEM HCL 60 MG
120 TABLET ORAL
Status: DISCONTINUED | OUTPATIENT
Start: 2022-07-05 | End: 2022-07-06 | Stop reason: HOSPADM

## 2022-07-05 RX ORDER — DILTIAZEM HYDROCHLORIDE 5 MG/ML
10-15 INJECTION INTRAVENOUS
Status: DISCONTINUED | OUTPATIENT
Start: 2022-07-05 | End: 2022-07-06 | Stop reason: HOSPADM

## 2022-07-05 RX ORDER — DIPHENHYDRAMINE HYDROCHLORIDE 50 MG/ML
25-50 INJECTION INTRAMUSCULAR; INTRAVENOUS
Status: DISCONTINUED | OUTPATIENT
Start: 2022-07-05 | End: 2022-07-06 | Stop reason: HOSPADM

## 2022-07-05 RX ORDER — METOPROLOL TARTRATE 1 MG/ML
5-15 INJECTION, SOLUTION INTRAVENOUS
Status: DISCONTINUED | OUTPATIENT
Start: 2022-07-05 | End: 2022-07-06 | Stop reason: HOSPADM

## 2022-07-05 RX ORDER — ONDANSETRON 2 MG/ML
4 INJECTION INTRAMUSCULAR; INTRAVENOUS
Status: DISCONTINUED | OUTPATIENT
Start: 2022-07-05 | End: 2022-07-06 | Stop reason: HOSPADM

## 2022-07-05 RX ORDER — ACYCLOVIR 200 MG/1
0-1 CAPSULE ORAL
Status: DISCONTINUED | OUTPATIENT
Start: 2022-07-05 | End: 2022-07-06 | Stop reason: HOSPADM

## 2022-07-05 RX ORDER — IVABRADINE 5 MG/1
5-15 TABLET, FILM COATED ORAL
Status: COMPLETED | OUTPATIENT
Start: 2022-07-05 | End: 2022-07-05

## 2022-07-05 RX ADMIN — IVABRADINE 15 MG: 5 TABLET, FILM COATED ORAL at 10:06

## 2022-07-05 RX ADMIN — METOPROLOL TARTRATE 10 MG: 5 INJECTION INTRAVENOUS at 11:07

## 2022-07-05 RX ADMIN — METOPROLOL TARTRATE 100 MG: 50 TABLET, FILM COATED ORAL at 10:06

## 2022-07-05 RX ADMIN — IOPAMIDOL 110 ML: 755 INJECTION, SOLUTION INTRAVENOUS at 11:59

## 2022-07-06 ENCOUNTER — TELEPHONE (OUTPATIENT)
Dept: TRANSPLANT | Facility: CLINIC | Age: 32
End: 2022-07-06

## 2022-07-07 ENCOUNTER — OFFICE VISIT (OUTPATIENT)
Dept: FAMILY MEDICINE | Facility: CLINIC | Age: 32
End: 2022-07-07
Attending: INTERNAL MEDICINE
Payer: COMMERCIAL

## 2022-07-07 VITALS
WEIGHT: 147 LBS | HEIGHT: 67 IN | OXYGEN SATURATION: 98 % | RESPIRATION RATE: 20 BRPM | HEART RATE: 82 BPM | SYSTOLIC BLOOD PRESSURE: 104 MMHG | BODY MASS INDEX: 23.07 KG/M2 | DIASTOLIC BLOOD PRESSURE: 50 MMHG | TEMPERATURE: 97.9 F

## 2022-07-07 DIAGNOSIS — G89.28 OTHER CHRONIC POSTPROCEDURAL PAIN: ICD-10-CM

## 2022-07-07 DIAGNOSIS — F90.9 ATTENTION DEFICIT HYPERACTIVITY DISORDER (ADHD), UNSPECIFIED ADHD TYPE: ICD-10-CM

## 2022-07-07 DIAGNOSIS — K70.31 ALCOHOLIC CIRRHOSIS OF LIVER WITH ASCITES (H): Primary | ICD-10-CM

## 2022-07-07 DIAGNOSIS — N18.6 ESRD (END STAGE RENAL DISEASE) ON DIALYSIS (H): ICD-10-CM

## 2022-07-07 DIAGNOSIS — F41.1 GAD (GENERALIZED ANXIETY DISORDER): ICD-10-CM

## 2022-07-07 DIAGNOSIS — R29.91 ABNORMAL FINDING OF FOOT: ICD-10-CM

## 2022-07-07 DIAGNOSIS — Q66.70 HIGH FOOT ARCH: ICD-10-CM

## 2022-07-07 DIAGNOSIS — M24.576 CONTRACTURE OF JOINT OF FOOT, UNSPECIFIED LATERALITY: ICD-10-CM

## 2022-07-07 DIAGNOSIS — G62.9 PERIPHERAL POLYNEUROPATHY: ICD-10-CM

## 2022-07-07 DIAGNOSIS — Z99.2 ESRD (END STAGE RENAL DISEASE) ON DIALYSIS (H): ICD-10-CM

## 2022-07-07 DIAGNOSIS — R45.84 ANHEDONIA: ICD-10-CM

## 2022-07-07 PROCEDURE — 99215 OFFICE O/P EST HI 40 MIN: CPT | Performed by: FAMILY MEDICINE

## 2022-07-07 RX ORDER — LACTULOSE 10 G/15ML
20 SOLUTION ORAL
Status: CANCELLED | OUTPATIENT
Start: 2022-07-07

## 2022-07-07 RX ORDER — CYCLOBENZAPRINE HCL 10 MG
10 TABLET ORAL DAILY PRN
Status: CANCELLED | OUTPATIENT
Start: 2022-07-07

## 2022-07-07 RX ORDER — TRANSGALACTOOLIGOSACCHARIDES 2.75 G
1 POWDER IN PACKET (EA) ORAL DAILY
Status: CANCELLED | OUTPATIENT
Start: 2022-07-07

## 2022-07-07 RX ORDER — LACTULOSE 10 G/15ML
20 SOLUTION ORAL
COMMUNITY
Start: 2021-12-16 | End: 2024-01-30

## 2022-07-07 RX ORDER — GABAPENTIN 100 MG/1
100 CAPSULE ORAL
Status: CANCELLED | OUTPATIENT
Start: 2022-07-08

## 2022-07-07 RX ORDER — DEXTROAMPHETAMINE SACCHARATE, AMPHETAMINE ASPARTATE MONOHYDRATE, DEXTROAMPHETAMINE SULFATE AND AMPHETAMINE SULFATE 7.5; 7.5; 7.5; 7.5 MG/1; MG/1; MG/1; MG/1
30 CAPSULE, EXTENDED RELEASE ORAL
Status: CANCELLED | OUTPATIENT
Start: 2022-07-07

## 2022-07-07 RX ORDER — ROPINIROLE 0.25 MG/1
0.25 TABLET, FILM COATED ORAL
COMMUNITY
Start: 2021-12-16 | End: 2022-08-03

## 2022-07-07 RX ORDER — GABAPENTIN 300 MG/1
300 CAPSULE ORAL AT BEDTIME
Qty: 90 CAPSULE | Refills: 3 | Status: SHIPPED | OUTPATIENT
Start: 2022-07-07 | End: 2022-07-07

## 2022-07-07 RX ORDER — GABAPENTIN 300 MG/1
CAPSULE ORAL
Qty: 90 CAPSULE | Refills: 3 | Status: SHIPPED | OUTPATIENT
Start: 2022-07-07 | End: 2022-07-20

## 2022-07-07 RX ORDER — DEXTROAMPHETAMINE SULFATE 20 MG/1
20 TABLET ORAL
COMMUNITY
End: 2022-07-07

## 2022-07-07 RX ORDER — HYDROXYZINE HYDROCHLORIDE 25 MG/1
50 TABLET, FILM COATED ORAL
Qty: 30 TABLET | Refills: 1 | Status: SHIPPED | OUTPATIENT
Start: 2022-07-07 | End: 2022-07-20

## 2022-07-07 RX ORDER — DEXTROAMPHETAMINE SACCHARATE, AMPHETAMINE ASPARTATE MONOHYDRATE, DEXTROAMPHETAMINE SULFATE AND AMPHETAMINE SULFATE 7.5; 7.5; 7.5; 7.5 MG/1; MG/1; MG/1; MG/1
30 CAPSULE, EXTENDED RELEASE ORAL EVERY MORNING
COMMUNITY
Start: 2022-07-07 | End: 2022-08-11

## 2022-07-07 RX ORDER — DEXTROAMPHETAMINE SULFATE 20 MG/1
20 TABLET ORAL
Status: CANCELLED | OUTPATIENT
Start: 2022-07-07

## 2022-07-07 RX ORDER — DEXTROAMPHETAMINE SULFATE 20 MG/1
20 TABLET ORAL
COMMUNITY
Start: 2022-07-07 | End: 2022-08-03

## 2022-07-07 RX ORDER — ALPRAZOLAM 0.5 MG
0.5 TABLET ORAL
COMMUNITY
End: 2022-08-03

## 2022-07-07 RX ORDER — TORSEMIDE 20 MG/1
40 TABLET ORAL
COMMUNITY
Start: 2021-12-16 | End: 2022-08-03

## 2022-07-07 RX ORDER — VITAMIN B COMPLEX
1 CAPSULE ORAL
COMMUNITY
Start: 2021-12-16 | End: 2024-01-30

## 2022-07-07 ASSESSMENT — PAIN SCALES - GENERAL: PAINLEVEL: NO PAIN (0)

## 2022-07-07 NOTE — PROGRESS NOTES
Assessment & Plan     Patient comes in today establish care. Currently, resident at Nursing Home.   He does have history of end-stage renal disease he is on hemodialysis 3 times a week.  In addition, has liver disease secondary to alcohol abuse, he has been seen gastroenterologist at the .  He reports he is on waiting list for kidney transplant.    Necrotic pancreatitis, he underwent multiple endoscopic necrosectomy and  percutaneous drainage.    Patient reports that one time he had pericarditis, in addition, he had a feeding tube.  Currently, he lives in a nursing home is scheduled to be discharged in one week time and move to  an apartment.    History of ADHD he is on Adderall, history of depression, anxiety disorder, he is on  Xanax or hydroxyzine uses as needed.  Complaining of pain chronic pain in his feet, numbness burning sensation.  He does have high arches, and  contracted toes.  He is chronically on pain medication, he is on buprenorphine Hcl.  He does need to be fitted with customized shoes    Alcoholic cirrhosis of liver with ascites (H)  Follow with GI  - Primary Care Referral    Other chronic postprocedural pain  Currently he is on Buprenorphine,   Discussed with patient this medicine  I cannot fill and I can't prescribed Narcotic medications while he is on Benzo and stimulants..  Currently, he is in the nursing home.  Advised patient to work with his current attending physician , to wean him off, before his discharged nursing home.  Otherwise, he may need to be referred to a pain clinic.    Discussed other alternatives such as gabapentin, I started him on gabapentin.  He is already on duloxetine.  In addition, he was requiring regarding regarding  Marijuana usage.  I gave him  Dr. Sidney britt to make an appt and see if he is qualifies.    Peripheral polyneuropathy    - gabapentin (NEURONTIN) 300 MG capsule; One tab at bedtime for 3 days, then one bid    Severe malnutrition (H)  Weight stable,  eating better.    Abnormal finding of foot  Referred to be fitted in custom shoes.  - Orthotics and Prosthetics DME Orthotic; Foot Orthotics    High foot arch  Need espica, customize shoes.  - Orthotics and Prosthetics DME Orthotic; Foot Orthotics  - gabapentin (NEURONTIN) 300 MG capsule; One tab at bedtime for 3 days, then one bid    Contracture of joint of foot, unspecified laterality  - Orthotics and Prosthetics DME Orthotic; Foot Orthotics  - gabapentin (NEURONTIN) 300 MG capsule; One tab at bedtime for 3 days, then one bid    Anhedonia    - hydrOXYzine (ATARAX) 25 MG tablet; 2 tablets (50 mg) by Oral or Feeding Tube route nightly as needed for anxiety or other (adjuvant pain)    ESRD (end stage renal disease) on dialysis (H)  On Hemodialysis.    History of ADHD:   He is on Adderall XR 30 mg in AM and  Dextroamphetamine 20 mg at noon.    Tobacco Cessation:   reports that he has been smoking cigarettes. He has been smoking about 0.50 packs per day. He has never used smokeless tobacco.  Tobacco Cessation Action Plan: cutting down, a few cig a day.    Over 55 minutes spent reviewing chart, reviewing test results, talking with and examining patient, formulating plan, and documentation on the day of the encounter.      Return in about 3 months (around 10/7/2022) for in person, Follow up.    Kole Gomez MD  [facility]    David Verduzco is a 31 year old{, presenting for the following health issues:      History of Present Illness       Reason for visit:  See new doctor and update each other on past, present, and future care. (Hospital follow up)    He eats 2-3 servings of fruits and vegetables daily.He consumes 3 sweetened beverage(s) daily.He exercises with enough effort to increase his heart rate 30 to 60 minutes per day.  He exercises with enough effort to increase his heart rate 6 days per week.   He is taking medications regularly.     Review of Systems   Constitutional, HEENT,  cardiovascular, pulmonary, GI, , musculoskeletal, neuro, skin, endocrine and psych systems are negative, except as otherwise noted.      Objective    There were no vitals taken for this visit.  There is no height or weight on file to calculate BMI.  Physical Exam   GENERAL: healthy, alert and no distress  MS: feet exam  High arches bilaterally  Toes flexed contracted.  SKIN: no suspicious lesions or rashes  PSYCH: mentation appears normal, affect normal/bright    Orders Placed This Encounter   Procedures     REVIEW OF HEALTH MAINTENANCE PROTOCOL ORDERS     Orthotics and Prosthetics DME Orthotic; Foot Orthotics     Kole Gomez MD            .  ..

## 2022-07-07 NOTE — TELEPHONE ENCOUNTER
July 7, 2022 8:32 AM - Lauren Castaneda LPN:   PCP: Brianna Tim   Referring Provider: self referred  Referring Diagnosis: ESRD  Neph: Ramez Kirkland, DO (requested records)    Is patient in a group home/assisted living? Yes @ [facility]   Does patient have a guardian? no    Referral intake process completed.  Patient is aware that after financial approval is received, medical records will be requested.   Patient confirmed for a callback from transplant coordinator on July 12, 2022. (within 2 weeks)  Tentative evaluation date - TBD, patient just left dialysis not scheduled  (within 4 weeks) if appointment is virtual, does patient have capabilities of setting this up?     Confirmed coordinator will discuss evaluation process in more detail at the time of their call.   Patient is aware of the need to arrange age appropriate cancer screening, vaccinations, and dental care.  Reminded patient to complete questionnaire, complete medical records release, and review packet prior to evaluation visit .  Assessed patient for special needs (ie-wheelchair, assistance, guardian, and ):  wheelchair   Patient instructed to call [phone] with questions.     Patient gave verbal consent during intake call to obtain medical records and documents outside of [facility]:  yes

## 2022-07-12 ENCOUNTER — VIRTUAL VISIT (OUTPATIENT)
Dept: TRANSPLANT | Facility: CLINIC | Age: 32
End: 2022-07-12
Attending: INTERNAL MEDICINE
Payer: COMMERCIAL

## 2022-07-12 VITALS — BODY MASS INDEX: 23.07 KG/M2 | HEIGHT: 67 IN | WEIGHT: 147 LBS

## 2022-07-12 DIAGNOSIS — N18.6 ESRD (END STAGE RENAL DISEASE) (H): Primary | ICD-10-CM

## 2022-07-12 DIAGNOSIS — F10.11 HISTORY OF ALCOHOL ABUSE: ICD-10-CM

## 2022-07-12 DIAGNOSIS — K70.30 ALCOHOLIC CIRRHOSIS OF LIVER WITHOUT ASCITES (H): ICD-10-CM

## 2022-07-12 DIAGNOSIS — K76.6 PORTAL HYPERTENSION (H): ICD-10-CM

## 2022-07-12 DIAGNOSIS — N18.6 END STAGE KIDNEY DISEASE (H): ICD-10-CM

## 2022-07-12 DIAGNOSIS — K70.31 ALCOHOLIC CIRRHOSIS OF LIVER WITH ASCITES (H): ICD-10-CM

## 2022-07-12 PROCEDURE — G0463 HOSPITAL OUTPT CLINIC VISIT: HCPCS | Mod: PN,RTG

## 2022-07-12 PROCEDURE — 99215 OFFICE O/P EST HI 40 MIN: CPT | Mod: 95

## 2022-07-12 NOTE — TELEPHONE ENCOUNTER
Contacted patient and introduced myself as their Transplant Coordinator, also introduced the role of the Transplant Coordinator in the transplant process.  Explained the purpose of this call including reviewing next steps and answering questions.    Confirmed Referring Provider, Dialysis Center, and Primary Care Physician. Notified patient of the importance of continued communication with referring providers and primary care physicians.    Reviewed components of transplant evaluation process including necessary appointments, tests, and procedures.    Answered questions for patient regarding evaluation, provided my name and contact information and requested they call with any additional questions.    Determined that patient would like additional information regarding transplant by:     Drop Down choices: Mail, Email, MyChart, Phone Call   Encourage MyChart   Notified patients that they will hear from a Transplant  to schedule evaluation.     Reviewed medical records and interviewed the patient. ESRD on HD since 5/2021. Alcoholic cirrhosis and evaluated by liver team and determined to not need a liver but passed onto kidney team for evaluation. He has been in the hospital or skilled nursing facility since 5/2021 and the plan is subsidizing housing in an apartment soon. He receives 2.45 hours of PCA per day and patient is fairly independent with his own ADL's. He will be living alone but describes a large supportive network of family and friends. Has been sober since 5/2021 and has never gone through treatment. He has ADHD. Developed necrotic pancreatitis/pseudocyst and chronic abdominal and foot pain. Multiple endoscopic necrosectomy and percutaneous drainage.  He is on suboxone. He will be seeing a provider about CBD gummies to deal with his pain, anxiety/depression and nausea and vomiting. Developed pericarditis and had many thoracentesis procedures and eventually had a tamponade episode requiring a  pericardial window. Never received blood, smokes cigarettes, no other recreational drugs. BMI 25. There are potential live donors.     He was seen by nephrology this morning virtually.  We discussed the goals of an evaluation and the approval process. He will need to watch the online MTP and understands this must be done before he can be listed.

## 2022-07-12 NOTE — PROGRESS NOTES
"Called patient , no answer, left message , I will try to call patient again in 20 mins. Called several times no answer. Yue Lopez CMA      Chief Complaint   Patient presents with     Transplant Evaluation     Kidney       Height 1.702 m (5' 7\"), weight 66.7 kg (147 lb).    Dax is a 31 year old who is being evaluated via a billable video visit.       How would you like to obtain your AVS? [employer]  If the video visit is dropped, the invitation should be resent by: Other e-mail: use [employer]  Will anyone else be joining your video visit? No        Video-Visit Details    Video Start Time: 10:35 am     Type of service:  Video Visit    Video End Time:11:10 am     Originating Location (pt. Location): Long term [facility]    Distant Location (provider location):  [facility]     Platform used for Video Visit: Well     TRANSPLANT NEPHROLOGY RECIPIENT EVALUATION NOTE    Assessment and Plan:  # Kidney Transplant Evaluation: Patient is a fair candidate overall. Benefits of a living donor transplant were discussed.    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 Has a potential donor.     # Necrotizing pancreatitis:  s/p multiple endoscopic necrosectomy's and percutaneous drainage of the large necrotic cavity, last in August 2021. Complains of daily abdominal pain. Will need review by pancreatitis clinic.     # ETOH Cirrhosis:  Last paracentesis done in July 2021 (WBC 2663 - 74% PMN, SAAG <1.1, protein 4.1). No varies on June 2021 EGD, repeat pending. Recent MELD 24, LFTs and INR WNLs, albumin 3.9, platelet count 180s.  Per last liver transplant committee note,  he is not a candidate for liver but okay to proceed with kidney transplant evaluation.    # ETOH abuse: reports 1 year and 2 months of sobriety.  Appreciate social work input.    # PI: on enzymes.     # Recurrent pleural effusions: " s/p multiple thoracentesis confirming a hepatic/pancreatic hydrothorax. Last thora in 2/2022. Recent CXR with small to moderate in size right effusion.     # Malnutrition had a PEG-J placement in 2021 complicated by gastric outlet syndrome and g tube fistula.  Removed since February. Complains of onging poor appeitite. Managing fairly well with small meals. Weight stable, BMI 23. May need feeding tube again?    # Daily nausea/vomiting: needs GI.      # Cardiac Risk: needs risk assessment with h/o pericarditis with pericardial tamponade s/p pericardial window (10/2021).     # Hypotension: using midodrine 10-20 mg mostly on dialysis days only with stable BPs.     # Chronic pain:  taking Suboxone and seeking medicinal marijuana option with local providers.     # Current tobacco use: smoking 1/4 ppd. Strongly encouraged cessation and provided education regarding poor transplant outcomes with smoking.     # Support: Reports good family support and identifies and is at a care partner.  Appreciate social work input.    # Functional capacity: using wheelchair or walker. Living in long-term care facility but planning to move to section 8 house in a few months. Staff administer meds now, but he states he will be able to do on his own. Appreciate social work input. Will need in person surgery visit and frailty testing.     # ADHD, Depression: Appreciate social work input.    # Health Maintenance: Dental: Not up to date (needs multiple teeth extracted).     Discussed the risks and benefits of a transplant, including the risk of surgery and immunosuppression medications.  Patient's overall evaluation will be discussed in the Transplant Program's regular meeting with a final recommendation on the patients suitability for transplant to be made at that time.    Pending completion of the full evaluation, patient presently appears to be enough of an acceptable kidney transplant recipient candidate to have any potential kidney donors  start the evaluation process.      Evaluation:  Dax Villareal was seen in consultation at the request of Dr. Morrow  for evaluation as a potential kidney transplant recipient.    Reason for Visit:  Dax Villareal is a 31 year old male with ESKD from HRS/ AKIs, who presents for kidney transplant evaluation.    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 It appears he had baseline normal renal function when he first presented to [facility] in 5/2021. He had a prolonged hospitalization there with necrotizing pancreatitis with pseudocyst, peritonitis, septic shock, RADHA and was initiated on RRT on 5/26/2021. He has remained anuric and dialysis dependent since.           Kidney Disease Dx: RADHA/ HRS        Biopsy Proven: No         On Dialysis: Yes, Date initiated: 5/2021 and Dialysis Type: [facility];       Primary Nephrologist: Dr. Kirkland        H/o Kidney Stones: No       H/o Recurrent/Frequent UTI: No         Diabetic Hx: None           Cardiac/Vascular Disease Risk Factors:        Cardiac Risk Factors: CKD and Smoking       Known CAD: No       Known PAD/Caludication Symptoms: No       Known Heart Failure: No       Arrhythmia: No       Pulmonary Hypertension: No       Valvular Disease: No       Other: H/o pericarditis with pericardial tamponade s/p pericardial window (10/2021)           Viral Serology Status       CMV IgG Antibody: Unknown       EBV IgG Antibody: Unknown         Volume Status/Weight:        Volume status: Not assessed       Weight:  Acceptable BMI       BMI: Body mass index is 23.02 kg/m .         Functional Capacity/Frailty:   Has been inpatient or TCU since May 2021.  Planning to move to section 8 housing in a few months.  Using wheel chair for longer distances and a walker when outside of his facility.  With  walking he denies chest pain or shortness of breath.  He is considering going on cruise in the near future with a friend.  He reports good local family support and identifies his aunt as a care partner.      Fatigue/Decreased Energy: [] No [x] Yes    Chest Pain or SOB with Exertion: [x] No [] Yes    Significant Weight Change: [] No [x] Yes    Nausea, Vomiting or Diarrhea: [] No [x] Yes N/V daily, poor appeite   Fever, Sweats or Chills:  [x] No [] Yes    Leg Swelling [x] No [] Yes        History of Cancer: None    Other Significant Medical Issues:   # ETOH Cirrhosis:  With h/o paracentesis but in the setting of peritonitis, last done in July 2021 (WBC 2663 - 74% PMN, SAAG <1.1, protein 4.1). No varies on June 2021 EGD, repeat pending. Recent MELD 24, LFTs and INR WNLs, albumin 3.9, platelet count 180s.  Per last liver transplant committee note,  he is not a candidate for liver but okay to proceed with kidney transplant evaluation.    # ETOH abuse: reports 1 year and 2 months of sobriety.     # Necrotizing pancreatitis:  s/p multiple endoscopic necrosectomy's and percutaneous drainage of the large necrotic cavity, last in August 2021. Complains of daily abdominal pain. Will need review by pancreatitis clinic.     # PI: on enzymes.     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 Last thora in 2/2022. Recent CXR with small to moderate in size right effusion.     # Malnutrition had a PEG-J placement last year complicated by gastric outlet syndrome and g tube fistula.  Removed since February. Complains of onging poor appeitite. Managing fairly well with small meals. Weight stable, BMI 23.     # Daily nausea/vomiting: needs GI.      # Chronic pain:  taking Suboxone and seeking medicinal marijuana option with local providers.     # Hypotension: using midodrine 10-20 mg mostly on dialysis days only.    # Current tobacco use: smoking 1/4 ppd.     # COVID Vaccination  Up To Date: Yes    Potential Living Kidney Donors: Yes, Mom's cousin     Review of Systems:  A comprehensive review of systems was obtained and negative, except as noted in the HPI or PMH.    Past Medical History:   Medical record was reviewed and PMH was discussed with patient and noted below.  Past Medical History:   Diagnosis Date     ADHD 2008       Past Social History:   Past Surgical History:   Procedure Laterality Date     COMBINED ESOPHAGOSCOPY, GASTROSCOPY, DUODENOSCOPY (EGD), TISSUE APPOSI N/A 9/20/2021    Procedure: ESOPHAGOGASTRODUODENOSCOPY WITH SUTURE FISTULA CLOSURE, argon plasma coagulation;  Surgeon: Jose Quigley MD;  Location: [facility] OR     ENDOSCOPIC INSERTION TUBE GASTROSTOMY N/A 7/13/2021    Procedure: Upper endoscopy, INSERTION, PEG-J TUBE and Gastropexy;  Surgeon: Rayshawn Will MD;  Location: [facility]     ENDOSCOPIC INSERTION TUBE GASTROSTOMY  9/13/2021    Procedure: PEG/J PLACEMENT.;  Surgeon: Rayshawn Will MD;  Location: [facility]     ENDOSCOPIC ULTRASOUND UPPER GASTROINTESTINAL TRACT (GI) N/A 7/21/2021    Procedure: ENDOSCOPIC ULTRASOUND, ESOPHAGOSCOPY / UPPER GASTROINTESTINAL TRACT (GI) with cyst gastrostomy, dilation;  Surgeon: Guru Yecenia Chacko MD;  Location: [facility] OR     ENDOSCOPIC ULTRASOUND, ESOPHAGOSCOPY, GASTROSCOPY, DUODENOSCOPY (EGD), NECROSECTOMY N/A 8/11/2021    Procedure: ESOPHAGOGASTRODUODENOSCOPY (EGD) with necrosectomy, stent exchange.;  Surgeon: Amadou Beasley MD;  Location: [facility]     ESOPHAGOSCOPY, GASTROSCOPY, DUODENOSCOPY (EGD), COMBINED N/A 7/28/2021    Procedure: ESOPHAGOGASTRODUODENOSCOPY (EGD), gastrostomy apposition, Necrosectomy, stent exchange.;  Surgeon: Rayshawn Will MD;  Location: [facility]     ESOPHAGOSCOPY, GASTROSCOPY, DUODENOSCOPY (EGD), COMBINED N/A 8/6/2021    Procedure: ESOPHAGOGASTRODUODENOSCOPY (EGD) with necrosectomy, and stents exchanged;  Surgeon: Jose Quigley MD;  Location: [facility]     ESOPHAGOSCOPY,  "GASTROSCOPY, DUODENOSCOPY (EGD), COMBINED Left 9/13/2021    Procedure: ESOPHAGOGASTRODUODENOSCOPY (EGD), GASTROSTOMY TUBE REMOVAL, FISTULAR CLOSURE ENDOSCOPIC.;  Surgeon: Rayshawn Will MD;  Location: [facility]     IR ABSCESS TUBE CHANGE  7/14/2021     IR PARACENTESIS  7/21/2021     IR SINOGRAM INJECTION DIAGNOSTIC  8/25/2021     ORTHOPEDIC SURGERY Right     fracture repair     REPLACE GASTROSTOMY TUBE, PERCUTANEOUS N/A 7/28/2021    Procedure: gastro-jejunostomy tube exchange;  Surgeon: Rayshawn Will MD;  Location: [facility] OR     Personal history of bleeding or anesthesia problems: No    Family History:  Family History   Problem Relation Age of Onset     Attention Deficit Disorder Mother      Alcoholism Mother      Alcoholism Father      Alcoholism Brother      Alcoholism Sister        Personal History:   Social History     Socioeconomic History     Marital status: Single     Spouse name: Not on file     Number of children: Not on file     Years of education: Not on file     Highest education level: Not on file   Occupational History     Not on file   Tobacco Use     Smoking status: Current Every Day Smoker     Packs/day: 0.50     Types: Cigarettes     Smokeless tobacco: Never Used     Tobacco comment: 8/24/2021 Patient did not want to discuss quitting but would like 4 mg lozenge to use during admission. Patient also accepted \"Quitting for Good\" workbook   Vaping Use     Vaping Use: Never used     Passive vaping exposure: Yes   Substance and Sexual Activity     Alcohol use: Not Currently     Comment: \"no alcohol for over 1 year ago\" per pt on 5/12/22     Drug use: No     Sexual activity: Not Currently     Partners: Female   Other Topics Concern     Not on file   Social History Narrative     Not on file     Social Determinants of Health     Financial Resource Strain: Not on file   Food Insecurity: Not on file   Transportation Needs: Not on file   Physical Activity: Not on file   Stress: Not on file   Social " Connections: Not on file   Intimate Partner Violence: Not on file   Housing Stability: Not on file       Allergies:  Allergies   Allergen Reactions     Tylenol [Acetaminophen] Itching       Medications:  Current Outpatient Medications   Medication Sig     acetaminophen (TYLENOL) 325 MG/10.15ML solution 10.15 mLs (325 mg) by Oral or PEGJ tube route every 4 hours as needed for mild pain or fever     ALPRAZolam (XANAX) 0.5 MG tablet Take 0.5 mg by mouth     amphetamine-dextroamphetamine (ADDERALL XR) 30 MG 24 hr capsule Take 1 capsule (30 mg) by mouth every morning     amylase-lipase-protease (CREON 24) 29951-34598 units CPEP per EC capsule 2 capsules by Per G Tube route 3 times daily (with meals)     amylase-lipase-protease (CREON 24) 73005-16303 units CPEP per EC capsule 1-2 capsules by Per Feeding Tube route every 4 hours     Buprenorphine HCl (BELBUCA) 150 MCG FILM buccal film Place 1 Film (150 mcg) inside cheek every 12 hours     Cetaphil Moisturizing (CETAPHIL) external lotion Apply topically 2 times daily as needed for dry skin or irritation (Apply to chest)     Dextroamphetamine Sulfate 20 MG TABS Take 20 mg by mouth daily at 2 pm One tab at 2 pm     DULoxetine (CYMBALTA) 30 MG capsule Take 30 mg by mouth     folic acid (FOLVITE) 1 MG tablet 1 tablet (1 mg) by Oral or Feeding Tube route daily     guaiFENesin (ROBITUSSIN) 20 mg/mL SOLN solution Take 10 mLs by mouth every 4 hours as needed for cough     Guar Gum (FIBER MODULAR, NUTRISOURCE FIBER,) packet 1 packet by Per Feeding Tube route 3 times daily     lactulose (CHRONULAC) 10 GM/15ML solution Take 20 g by mouth     melatonin 1 MG/ML LIQD liquid 6 mLs (6 mg) by Oral or Feeding Tube route daily (with dinner)     midodrine (PROAMATINE) 5 MG tablet 3 tablets (15 mg) by Per Feeding Tube route 3 times daily     ondansetron (ZOFRAN) 4 MG tablet 1 tablet (4 mg) by Per G Tube route every 8 hours as needed for nausea or vomiting     pantoprazole (PROTONIX) 2 mg/mL  "SUSP suspension 20 mLs (40 mg) by Oral or Feeding Tube route every morning (before breakfast)     potassium chloride (KLOR-CON) 20 MEQ packet 20 mEq by Per Feeding Tube route daily     prochlorperazine (COMPAZINE) 5 MG tablet Take 1 tablet (5 mg) by mouth every 6 hours as needed for nausea or vomiting     protein modular (PROSOURCE TF FREE) LIQD 1 packet by Per Feeding Tube route daily     rOPINIRole (REQUIP) 0.25 MG tablet Take 0.25 mg by mouth     sevelamer carbonate, RENVELA, 0.8 GM PACK Packet 2 packets (1.6 g) by Oral or Feeding Tube route 3 times daily     silver nitrate (ARZOL) 75-25 % miscellaneous Apply topically daily as needed for wound care     sodium bicarbonate 325 MG tablet 1 tablet (325 mg) by Per Feeding Tube route every 4 hours     thiamine (B-1) 100 MG tablet 1 tablet (100 mg) by Oral or Feeding Tube route daily     torsemide (DEMADEX) 20 MG tablet Take 40 mg by mouth     vitamin (B COMPLEX) capsule Take 1 each by mouth     zinc oxide (DESITIN) 40 % external ointment Apply topically every hour as needed for irritation     gabapentin (NEURONTIN) 300 MG capsule One tab at bedtime for 3 days, then one bid     hydrOXYzine (ATARAX) 25 MG tablet 2 tablets (50 mg) by Oral or Feeding Tube route nightly as needed for anxiety or other (adjuvant pain)     No current facility-administered medications for this visit.       Vitals:  Ht 1.702 m (5' 7\")   Wt 66.7 kg (147 lb)   BMI 23.02 kg/m      Exam:  GENERAL APPEARANCE: alert and no distress  HENT: no obvious abnormalities on appearance  RESP: breathing appears unremarkable with normal rate, no audible wheezing or cough and no apparent shortness of breath with conversation  MS: extremities normal - no gross deformities noted  SKIN: no apparent rash and normal skin tone  NEURO: speech is clear with no obvious neurological deficits  PSYCH: mentation appears normal and affect normal      Results:   Recent Results (from the past 336 hour(s))   EKG 12-lead, " tracing only [EKG1]    Collection Time: 06/28/22 12:38 PM   Result Value Ref Range    Systolic Blood Pressure  mmHg    Diastolic Blood Pressure  mmHg    Ventricular Rate 90 BPM    Atrial Rate 90 BPM    ME Interval 140 ms    QRS Duration 100 ms     ms    QTc 457 ms    P Axis 30 degrees    R AXIS 6 degrees    T Axis 48 degrees    Interpretation ECG       Sinus rhythm  Normal ECG  When compared with ECG of 11-OCT-2021 11:33,  No significant change was found  Confirmed by Baron Dee ([id number]) on 6/29/2022 8:15:17 AM     Creatinine POCT    Collection Time: 07/05/22 10:23 AM   Result Value Ref Range    Creatinine POCT 3.9 (H) 0.7 - 1.3 mg/dL    GFR, ESTIMATED POCT 20 (L) >60 mL/min/1.73m2

## 2022-07-12 NOTE — LETTER
"    7/12/2022         RE: Dax Villareal  [address]  [address]  [facility] [address]        Dear Colleague,    Thank you for referring your patient, Dax Villareal, to the [facility]. Please see a copy of my visit note below.    Called patient , no answer, left message , I will try to call patient again in 20 mins. Called several times no answer. Yue Lopez CMA      Chief Complaint   Patient presents with     Transplant Evaluation     Kidney       Height 1.702 m (5' 7\"), weight 66.7 kg (147 lb).    Dax is a 31 year old who is being evaluated via a billable video visit.         TRANSPLANT NEPHROLOGY RECIPIENT EVALUATION NOTE    Assessment and Plan:  # Kidney Transplant Evaluation: Patient is a fair candidate overall. Benefits of a living donor transplant were discussed.    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 Has a potential donor.     # Necrotizing pancreatitis:  s/p multiple endoscopic necrosectomy's and percutaneous drainage of the large necrotic cavity, last in August 2021. Complains of daily abdominal pain. Will need review by pancreatitis clinic.     # ETOH Cirrhosis:  Last paracentesis done in July 2021 (WBC 2663 - 74% PMN, SAAG <1.1, protein 4.1). No varies on June 2021 EGD, repeat pending. Recent MELD 24, LFTs and INR WNLs, albumin 3.9, platelet count 180s.  Per last liver transplant committee note,  he is not a candidate for liver but okay to proceed with kidney transplant evaluation.    # ETOH abuse: reports 1 year and 2 months of sobriety.  Appreciate social work input.    # PI: on enzymes.     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 Last thora in 2/2022. Recent CXR with small to moderate in size right effusion.     # Malnutrition had a PEG-J placement in 2021 complicated by " gastric outlet syndrome and g tube fistula.  Removed since February. Complains of onging poor appeitite. Managing fairly well with small meals. Weight stable, BMI 23. May need feeding tube again?    # Daily nausea/vomiting: needs GI.      # Cardiac Risk: needs risk assessment with h/o pericarditis with pericardial tamponade s/p pericardial window (10/2021).     # Hypotension: using midodrine 10-20 mg mostly on dialysis days only with stable BPs.     # Chronic pain:  taking Suboxone and seeking medicinal marijuana option with local providers.     # Current tobacco use: smoking 1/4 ppd. Strongly encouraged cessation and provided education regarding poor transplant outcomes with smoking.     # Support: Reports good family support and identifies and is at a care partner.  Appreciate social work input.    # Functional capacity: using wheelchair or walker. Living in long-term care facility but planning to move to [place] [address] house in a few months. Staff administer meds now, but he states he will be able to do on his own. Appreciate social work input. Will need in person surgery visit and frailty testing.     # ADHD, Depression: Appreciate social work input.    # Health Maintenance: Dental: Not up to date (needs multiple teeth extracted).     Discussed the risks and benefits of a transplant, including the risk of surgery and immunosuppression medications.  Patient's overall evaluation will be discussed in the Transplant Program's regular meeting with a final recommendation on the patients suitability for transplant to be made at that time.    Pending completion of the full evaluation, patient presently appears to be enough of an acceptable kidney transplant recipient candidate to have any potential kidney donors start the evaluation process.      Evaluation:  Dax Villareal was seen in consultation at the request of Dr. Morrow  for evaluation as a potential kidney transplant recipient.    Reason for Visit:  Dax Villareal is a 31  year old male with ESKD from HRS/ AKIs, who presents for kidney transplant evaluation.    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 It appears he had baseline normal renal function when he first presented to [facility] in 5/2021. He had a prolonged hospitalization there with necrotizing pancreatitis with pseudocyst, peritonitis, septic shock, RADHA and was initiated on RRT on 5/26/2021. He has remained anuric and dialysis dependent since.           Kidney Disease Dx: RADHA/ HRS        Biopsy Proven: No         On Dialysis: Yes, Date initiated: 5/2021 and Dialysis Type: [facility];       Primary Nephrologist: Dr. Kirkland        H/o Kidney Stones: No       H/o Recurrent/Frequent UTI: No         Diabetic Hx: None           Cardiac/Vascular Disease Risk Factors:        Cardiac Risk Factors: CKD and Smoking       Known CAD: No       Known PAD/Caludication Symptoms: No       Known Heart Failure: No       Arrhythmia: No       Pulmonary Hypertension: No       Valvular Disease: No       Other: H/o pericarditis with pericardial tamponade s/p pericardial window (10/2021)           Viral Serology Status       CMV IgG Antibody: Unknown       EBV IgG Antibody: Unknown         Volume Status/Weight:        Volume status: Not assessed       Weight:  Acceptable BMI       BMI: Body mass index is 23.02 kg/m .         Functional Capacity/Frailty:   Has been inpatient or TCU since May 2021.  Planning to move to [place] housing in a few months.  Using wheel chair for longer distances and a walker when outside of his facility.  With walking he denies chest pain or shortness of breath.  He is considering going on cruise in the near future with a friend.  He reports good local family support and identifies his aunt as a care partner.       Fatigue/Decreased Energy: [] No [x] Yes    Chest Pain or SOB with Exertion: [x] No [] Yes    Significant Weight Change: [] No [x] Yes    Nausea, Vomiting or Diarrhea: [] No [x] Yes N/V daily, poor appeite   Fever, Sweats or Chills:  [x] No [] Yes    Leg Swelling [x] No [] Yes        History of Cancer: None    Other Significant Medical Issues:   # ETOH Cirrhosis:  With h/o paracentesis but in the setting of peritonitis, last done in July 2021 (WBC 2663 - 74% PMN, SAAG <1.1, protein 4.1). No varies on June 2021 EGD, repeat pending. Recent MELD 24, LFTs and INR WNLs, albumin 3.9, platelet count 180s.  Per last liver transplant committee note,  he is not a candidate for liver but okay to proceed with kidney transplant evaluation.    # ETOH abuse: reports 1 year and 2 months of sobriety.     # Necrotizing pancreatitis:  s/p multiple endoscopic necrosectomy's and percutaneous drainage of the large necrotic cavity, last in August 2021. Complains of daily abdominal pain. Will need review by pancreatitis clinic.     # PI: on enzymes.     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 Last thora in 2/2022. Recent CXR with small to moderate in size right effusion.     # Malnutrition had a PEG-J placement last year complicated by gastric outlet syndrome and g tube fistula.  Removed since February. Complains of onging poor appeitite. Managing fairly well with small meals. Weight stable, BMI 23.     # Daily nausea/vomiting: needs GI.      # Chronic pain:  taking Suboxone and seeking medicinal marijuana option with local providers.     # Hypotension: using midodrine 10-20 mg mostly on dialysis days only.    # Current tobacco use: smoking 1/4 ppd.     # COVID Vaccination Up To Date: Yes    Potential Living Kidney Donors: Yes, Mom's cousin     Review of Systems:  A comprehensive review of systems was obtained and negative, except as noted in the HPI or PMH.    Past Medical  History:   Medical record was reviewed and [facility] was discussed with patient and noted below.  Past Medical History:   Diagnosis Date     ADHD 2008       Past Social History:   Past Surgical History:   Procedure Laterality Date     COMBINED ESOPHAGOSCOPY, GASTROSCOPY, DUODENOSCOPY (EGD), TISSUE APPOSI N/A 9/20/2021    Procedure: ESOPHAGOGASTRODUODENOSCOPY WITH SUTURE FISTULA CLOSURE, argon plasma coagulation;  Surgeon: Jose Quigley MD;  Location: [facility] OR     ENDOSCOPIC INSERTION TUBE GASTROSTOMY N/A 7/13/2021    Procedure: Upper endoscopy, INSERTION, PEG-J TUBE and Gastropexy;  Surgeon: Rayshawn Will MD;  Location: [facility] OR     ENDOSCOPIC INSERTION TUBE GASTROSTOMY  9/13/2021    Procedure: PEG/J PLACEMENT.;  Surgeon: Rayshawn Will MD;  Location: [facility]     ENDOSCOPIC ULTRASOUND UPPER GASTROINTESTINAL TRACT (GI) N/A 7/21/2021    Procedure: ENDOSCOPIC ULTRASOUND, ESOPHAGOSCOPY / UPPER GASTROINTESTINAL TRACT (GI) with cyst gastrostomy, dilation;  Surgeon: Guru Yecenia Chacko MD;  Location: [facility] OR     ENDOSCOPIC ULTRASOUND, ESOPHAGOSCOPY, GASTROSCOPY, DUODENOSCOPY (EGD), NECROSECTOMY N/A 8/11/2021    Procedure: ESOPHAGOGASTRODUODENOSCOPY (EGD) with necrosectomy, stent exchange.;  Surgeon: Amadou Beasley MD;  Location: [facility] OR     ESOPHAGOSCOPY, GASTROSCOPY, DUODENOSCOPY (EGD), COMBINED N/A 7/28/2021    Procedure: ESOPHAGOGASTRODUODENOSCOPY (EGD), gastrostomy apposition, Necrosectomy, stent exchange.;  Surgeon: Rayshawn Will MD;  Location: [facility] OR     ESOPHAGOSCOPY, GASTROSCOPY, DUODENOSCOPY (EGD), COMBINED N/A 8/6/2021    Procedure: ESOPHAGOGASTRODUODENOSCOPY (EGD) with necrosectomy, and stents exchanged;  Surgeon: Jose Quigley MD;  Location: [facility] OR     ESOPHAGOSCOPY, GASTROSCOPY, DUODENOSCOPY (EGD), COMBINED Left 9/13/2021    Procedure: ESOPHAGOGASTRODUODENOSCOPY (EGD), GASTROSTOMY TUBE REMOVAL, FISTULAR CLOSURE ENDOSCOPIC.;  Surgeon: Rayshawn Will MD;  Location: [facility] OR  "    IR ABSCESS TUBE CHANGE  7/14/2021     IR PARACENTESIS  7/21/2021     IR SINOGRAM INJECTION DIAGNOSTIC  8/25/2021     ORTHOPEDIC SURGERY Right     fracture repair     REPLACE GASTROSTOMY TUBE, PERCUTANEOUS N/A 7/28/2021    Procedure: gastro-jejunostomy tube exchange;  Surgeon: Rayshawn Will MD;  Location: [facility] OR     Personal history of bleeding or anesthesia problems: No    Family History:  Family History   Problem Relation Age of Onset     Attention Deficit Disorder Mother      Alcoholism Mother      Alcoholism Father      Alcoholism Brother      Alcoholism Sister        Personal History:   Social History     Socioeconomic History     Marital status: Single     Spouse name: Not on file     Number of children: Not on file     Years of education: Not on file     Highest education level: Not on file   Occupational History     Not on file   Tobacco Use     Smoking status: Current Every Day Smoker     Packs/day: 0.50     Types: Cigarettes     Smokeless tobacco: Never Used     Tobacco comment: 8/24/2021 Patient did not want to discuss quitting but would like 4 mg lozenge to use during admission. Patient also accepted \"Quitting for Good\" workbook   Vaping Use     Vaping Use: Never used     Passive vaping exposure: Yes   Substance and Sexual Activity     Alcohol use: Not Currently     Comment: \"no alcohol for over 1 year ago\" per pt on 5/12/22     Drug use: No     Sexual activity: Not Currently     Partners: Female   Other Topics Concern     Not on file   Social History Narrative     Not on file     Social Determinants of Health     Financial Resource Strain: Not on file   Food Insecurity: Not on file   Transportation Needs: Not on file   Physical Activity: Not on file   Stress: Not on file   Social Connections: Not on file   Intimate Partner Violence: Not on file   Housing Stability: Not on file       Allergies:  Allergies   Allergen Reactions     Tylenol [Acetaminophen] Itching       Medications:  Current " Outpatient Medications   Medication Sig     acetaminophen (TYLENOL) 325 MG/10.15ML solution 10.15 mLs (325 mg) by Oral or PEGJ tube route every 4 hours as needed for mild pain or fever     ALPRAZolam (XANAX) 0.5 MG tablet Take 0.5 mg by mouth     amphetamine-dextroamphetamine (ADDERALL XR) 30 MG 24 hr capsule Take 1 capsule (30 mg) by mouth every morning     amylase-lipase-protease (CREON 24) 43229-37022 units CPEP per EC capsule 2 capsules by Per G Tube route 3 times daily (with meals)     amylase-lipase-protease (CREON 24) 36919-77392 units CPEP per EC capsule 1-2 capsules by Per Feeding Tube route every 4 hours     Buprenorphine HCl (BELBUCA) 150 MCG FILM buccal film Place 1 Film (150 mcg) inside cheek every 12 hours     Cetaphil Moisturizing (CETAPHIL) external lotion Apply topically 2 times daily as needed for dry skin or irritation (Apply to chest)     Dextroamphetamine Sulfate 20 MG TABS Take 20 mg by mouth daily at 2 pm One tab at 2 pm     DULoxetine (CYMBALTA) 30 MG capsule Take 30 mg by mouth     folic acid (FOLVITE) 1 MG tablet 1 tablet (1 mg) by Oral or Feeding Tube route daily     guaiFENesin (ROBITUSSIN) 20 mg/mL SOLN solution Take 10 mLs by mouth every 4 hours as needed for cough     Guar Gum (FIBER MODULAR, NUTRISOURCE FIBER,) packet 1 packet by Per Feeding Tube route 3 times daily     lactulose (CHRONULAC) 10 GM/15ML solution Take 20 g by mouth     melatonin 1 MG/ML LIQD liquid 6 mLs (6 mg) by Oral or Feeding Tube route daily (with dinner)     midodrine (PROAMATINE) 5 MG tablet 3 tablets (15 mg) by Per Feeding Tube route 3 times daily     ondansetron (ZOFRAN) 4 MG tablet 1 tablet (4 mg) by Per G Tube route every 8 hours as needed for nausea or vomiting     pantoprazole (PROTONIX) 2 mg/mL SUSP suspension 20 mLs (40 mg) by Oral or Feeding Tube route every morning (before breakfast)     potassium chloride (KLOR-CON) 20 MEQ packet 20 mEq by Per Feeding Tube route daily     prochlorperazine (COMPAZINE) 5  "MG tablet Take 1 tablet (5 mg) by mouth every 6 hours as needed for nausea or vomiting     protein modular (PROSOURCE TF FREE) LIQD 1 packet by Per Feeding Tube route daily     rOPINIRole (REQUIP) 0.25 MG tablet Take 0.25 mg by mouth     sevelamer carbonate, RENVELA, 0.8 GM PACK Packet 2 packets (1.6 g) by Oral or Feeding Tube route 3 times daily     silver nitrate (ARZOL) 75-25 % miscellaneous Apply topically daily as needed for wound care     sodium bicarbonate 325 MG tablet 1 tablet (325 mg) by Per Feeding Tube route every 4 hours     thiamine (B-1) 100 MG tablet 1 tablet (100 mg) by Oral or Feeding Tube route daily     torsemide (DEMADEX) 20 MG tablet Take 40 mg by mouth     vitamin (B COMPLEX) capsule Take 1 each by mouth     zinc oxide (DESITIN) 40 % external ointment Apply topically every hour as needed for irritation     gabapentin (NEURONTIN) 300 MG capsule One tab at bedtime for 3 days, then one bid     hydrOXYzine (ATARAX) 25 MG tablet 2 tablets (50 mg) by Oral or Feeding Tube route nightly as needed for anxiety or other (adjuvant pain)     No current facility-administered medications for this visit.       Vitals:  Ht 1.702 m (5' 7\")   Wt 66.7 kg (147 lb)   BMI 23.02 kg/m      Exam:  GENERAL APPEARANCE: alert and no distress  HENT: no obvious abnormalities on appearance  RESP: breathing appears unremarkable with normal rate, no audible wheezing or cough and no apparent shortness of breath with conversation  MS: extremities normal - no gross deformities noted  SKIN: no apparent rash and normal skin tone  NEURO: speech is clear with no obvious neurological deficits  PSYCH: mentation appears normal and affect normal      Results:   Recent Results (from the past 336 hour(s))   EKG 12-lead, tracing only [EKG1]    Collection Time: 06/28/22 12:38 PM   Result Value Ref Range    Systolic Blood Pressure  mmHg    Diastolic Blood Pressure  mmHg    Ventricular Rate 90 BPM    Atrial Rate 90 BPM    AR Interval 140 ms "    QRS Duration 100 ms     ms    QTc 457 ms    P Axis 30 degrees    R AXIS 6 degrees    T Axis 48 degrees    Interpretation ECG       Sinus rhythm  Normal ECG  When compared with ECG of 11-OCT-2021 11:33,  No significant change was found  Confirmed by Baron Dee ([id number]) on 6/29/2022 8:15:17 AM     Creatinine POCT    Collection Time: 07/05/22 10:23 AM   Result Value Ref Range    Creatinine POCT 3.9 (H) 0.7 - 1.3 mg/dL    GFR, ESTIMATED POCT 20 (L) >60 mL/min/1.73m2       Again, thank you for allowing me to participate in the care of your patient.        Sincerely,        MARVIN

## 2022-07-12 NOTE — PROGRESS NOTES
Assessment and Plan:  1. liver transplant evaluation - patient is a fair candidate overall. Benefits and surgical risks of a liver transplantation were discussed.  2.  End stage liver disease due to Laennec's    Surgical evaluation:  1. Portal Vein:Patent  2. Hepatic Artery: Open  3. TIPS: absent  4. Previous Abdominal Surgery: Yes , h/o necrotizing pancreatitis requiring ex-lap necrosectomy 2021  5. Hepatocellular Carcinoma: None  6. Ascites: Present - minimal- moderate  7. Costal Angle: narrow  8. Portopulmonary Hypertension: absent  9. Hepatopulmonary Syndrome: absent  10. Cardiac Evaluation: needs echocardiogram  11. Nutritional Status: Moderate  12. Diabetes: no  13.Hypertension no  14. Smoker:current 5 cigs/day  14: Fraility index:yes, uses motorized wheelchair but states can walk sig distance, lives in nursing home currently  15. Meets guidelines to receive Living Donor  No  16. Potential Living donors No    Recommendations: 30 y/o male with EtOH related liver disease and ESRD on HD. TB and INR normal with PLT of 184.  Prior episode of necrotizing pancreatitis requiring X-Lap necrosectomy 2021. Unclear if he needs a liver transplant now with abstinence.  Although his MELD is calculated at 24 and he is a blood type B, if calculated with a normal Cr it is 7 and with a plt count of 187 may need to consider for kidney alone      Patients overall evaluation will be discussed at the Liver Transplant selection committee meeting with a final recommendation on the patients suitability for transplant to be made at that time.    Consult Full  Details:  Dax Villareal was seen in consultation at the request of Dr. Hurley for evaluation as a potential liver transplant recipient.    Reason for Visit:  Dax Villareal is a 31 year old year old male with Laennec's, who presents for liver transplant evaluation.    HPI:  Presenting complaint: Abdominal distention    30 y/o male with EtOH liver disease with ESRD on HD, 1st diagnosed  with liver disease in 05/2021, still makes urine, dialyzing MWF.  2020 developed pancreatitis that ultimately progressed to necrotizing pancreatitis requiring necrosectomy 07/2021.  He relates that he was quite ill around that time and helped prompt him to stop drinking, still has problems with emesis but does not look too nutritionally depleted.  Denies any previous issues with gi bleeding but has had issues with ascites (although no recent para) and mild HE    PMH  Cirrhosis EtOH  ESRD on HD MWF  Necrotizing Pancreatitis    PSH  X-Lap necrosectomy 07/2021    Soc  -Currently living in a nursing home but looking to move to a 1 bedroom apartment in [facility]  EtOH- previous heavy, quit 05/2021  Tob still smoking 5 cigs/day  ? Support        Past Medical History:   Diagnosis Date     ADHD 2008     Past Surgical History:   Procedure Laterality Date     COMBINED ESOPHAGOSCOPY, GASTROSCOPY, DUODENOSCOPY (EGD), TISSUE APPOSI N/A 9/20/2021    Procedure: ESOPHAGOGASTRODUODENOSCOPY WITH SUTURE FISTULA CLOSURE, argon plasma coagulation;  Surgeon: Jose Quigley MD;  [facility]: [facility]     ENDOSCOPIC INSERTION TUBE GASTROSTOMY N/A 7/13/2021    Procedure: Upper endoscopy, INSERTION, PEG-J TUBE and Gastropexy;  Surgeon: Rayshawn Will MD;  [facility]     ENDOSCOPIC INSERTION TUBE GASTROSTOMY  9/13/2021    Procedure: PEG/J PLACEMENT.;  Surgeon: Rayshawn Will MD;  [facility]     ENDOSCOPIC ULTRASOUND UPPER GASTROINTESTINAL TRACT (GI) N/A 7/21/2021    Procedure: ENDOSCOPIC ULTRASOUND, ESOPHAGOSCOPY / UPPER GASTROINTESTINAL TRACT (GI) with cyst gastrostomy, dilation;  Surgeon: Guru Yecenia Chacko MD;  [facility]     ENDOSCOPIC ULTRASOUND, ESOPHAGOSCOPY, GASTROSCOPY, DUODENOSCOPY (EGD), NECROSECTOMY N/A 8/11/2021    Procedure: ESOPHAGOGASTRODUODENOSCOPY (EGD) with necrosectomy, stent exchange.;  Surgeon: Amadou Beasley MD;  [facility]     ESOPHAGOSCOPY, GASTROSCOPY,  DUODENOSCOPY (EGD), COMBINED N/A 7/28/2021    Procedure: ESOPHAGOGASTRODUODENOSCOPY (EGD), gastrostomy apposition, Necrosectomy, stent exchange.;  Surgeon: Rayshawn Will MD;  [facility]     ESOPHAGOSCOPY, GASTROSCOPY, DUODENOSCOPY (EGD), COMBINED N/A 8/6/2021    Procedure: ESOPHAGOGASTRODUODENOSCOPY (EGD) with necrosectomy, and stents exchanged;  Surgeon: Jose Quigley MD;  [facility]     ESOPHAGOSCOPY, GASTROSCOPY, DUODENOSCOPY (EGD), COMBINED Left 9/13/2021    Procedure: ESOPHAGOGASTRODUODENOSCOPY (EGD), GASTROSTOMY TUBE REMOVAL, FISTULAR CLOSURE ENDOSCOPIC.;  Surgeon: Rayshawn Will MD;  [facility]     IR ABSCESS TUBE CHANGE  7/14/2021     IR PARACENTESIS  7/21/2021     IR SINOGRAM INJECTION DIAGNOSTIC  8/25/2021     ORTHOPEDIC SURGERY Right     fracture repair     REPLACE GASTROSTOMY TUBE, PERCUTANEOUS N/A 7/28/2021    Procedure: gastro-jejunostomy tube exchange;  Surgeon: Rayshawn Will MD;  [facility]     Past Surgical History:   Procedure Laterality Date     COMBINED ESOPHAGOSCOPY, GASTROSCOPY, DUODENOSCOPY (EGD), TISSUE APPOSI N/A 9/20/2021    Procedure: ESOPHAGOGASTRODUODENOSCOPY WITH SUTURE FISTULA CLOSURE, argon plasma coagulation;  Surgeon: Jose Quigley MD;  [facility]     ENDOSCOPIC INSERTION TUBE GASTROSTOMY N/A 7/13/2021    Procedure: Upper endoscopy, INSERTION, PEG-J TUBE and Gastropexy;  Surgeon: Rayshawn Will MD;  [facility]     ENDOSCOPIC INSERTION TUBE GASTROSTOMY  9/13/2021    Procedure: PEG/J PLACEMENT.;  Surgeon: Rayshawn iWll MD;  [facility]     ENDOSCOPIC ULTRASOUND UPPER GASTROINTESTINAL TRACT (GI) N/A 7/21/2021    Procedure: ENDOSCOPIC ULTRASOUND, ESOPHAGOSCOPY / UPPER GASTROINTESTINAL TRACT (GI) with cyst gastrostomy, dilation;  Surgeon: Guru Yecenia Chacko MD;  [facility]     ENDOSCOPIC ULTRASOUND, ESOPHAGOSCOPY, GASTROSCOPY, DUODENOSCOPY (EGD), NECROSECTOMY N/A 8/11/2021    Procedure:  ESOPHAGOGASTRODUODENOSCOPY (EGD) with necrosectomy, stent exchange.;  Surgeon: Amadou Beasley MD;  [facility]     ESOPHAGOSCOPY, GASTROSCOPY, DUODENOSCOPY (EGD), COMBINED N/A 7/28/2021    Procedure: ESOPHAGOGASTRODUODENOSCOPY (EGD), gastrostomy apposition, Necrosectomy, stent exchange.;  Surgeon: Rayshawn Will MD;  [facility]     ESOPHAGOSCOPY, GASTROSCOPY, DUODENOSCOPY (EGD), COMBINED N/A 8/6/2021    Procedure: ESOPHAGOGASTRODUODENOSCOPY (EGD) with necrosectomy, and stents exchanged;  Surgeon: Jose Quigley MD;  [facility]     ESOPHAGOSCOPY, GASTROSCOPY, DUODENOSCOPY (EGD), COMBINED Left 9/13/2021    Procedure: ESOPHAGOGASTRODUODENOSCOPY (EGD), GASTROSTOMY TUBE REMOVAL, FISTULAR CLOSURE ENDOSCOPIC.;  Surgeon: Rayshawn Will MD;  [facility]     IR ABSCESS TUBE CHANGE  7/14/2021     IR PARACENTESIS  7/21/2021     IR SINOGRAM INJECTION DIAGNOSTIC  8/25/2021     ORTHOPEDIC SURGERY Right     fracture repair     REPLACE GASTROSTOMY TUBE, PERCUTANEOUS N/A 7/28/2021    Procedure: gastro-jejunostomy tube exchange;  Surgeon: Rayshawn Will MD;  [facility]     Family History   Problem Relation Age of Onset     Attention Deficit Disorder Mother      Alcoholism Mother      Alcoholism Father      Alcoholism Brother      Alcoholism Sister      Allergies   Allergen Reactions     Tylenol [Acetaminophen] Itching     Prior to Admission medications    Medication Sig Start Date End Date Taking? Authorizing Provider   acetaminophen (TYLENOL) 325 MG/10.15ML solution 10.15 mLs (325 mg) by Oral or PEGJ tube route every 4 hours as needed for mild pain or fever 10/12/21  Yes Alejandro Duncan MD   amylase-lipase-protease (CREON 24) 82322-04581 units CPEP per EC capsule 2 capsules by Per G Tube route 3 times daily (with meals) 10/12/21  Yes Alejandro Duncan MD   amylase-lipase-protease (CREON 24) 11280-35945 units CPEP per EC capsule 1-2 capsules by Per Feeding Tube route every 4  hours 10/12/21  Yes Marleny Monk MD   Cetaphil Moisturizing (CETAPHIL) external lotion Apply topically 2 times daily as needed for dry skin or irritation (Apply to chest) 10/12/21  Yes Alejandro Duncan MD   folic acid (FOLVITE) 1 MG tablet 1 tablet (1 mg) by Oral or Feeding Tube route daily 10/13/21  Yes Alejandro Duncan MD   guaiFENesin (ROBITUSSIN) 20 mg/mL SOLN solution Take 10 mLs by mouth every 4 hours as needed for cough 10/12/21  Yes Alejandro Duncan MD   Guar Gum (FIBER MODULAR, NUTRISOURCE FIBER,) packet 1 packet by Per Feeding Tube route 3 times daily 10/12/21  Yes Alejandro Duncan MD   melatonin 1 MG/ML LIQD liquid 6 mLs (6 mg) by Oral or Feeding Tube route daily (with dinner) 10/12/21  Yes Alejandro Duncan MD   midodrine (PROAMATINE) 5 MG tablet 3 tablets (15 mg) by Per Feeding Tube route 3 times daily 10/12/21  Yes Alejandro Duncan MD   ondansetron (ZOFRAN) 4 MG tablet 1 tablet (4 mg) by Per G Tube route every 8 hours as needed for nausea or vomiting 10/12/21  Yes Alejandro Duncan MD   pantoprazole (PROTONIX) 2 mg/mL SUSP suspension 20 mLs (40 mg) by Oral or Feeding Tube route every morning (before breakfast) 10/13/21  Yes Alejandro Duncan MD   potassium chloride (KLOR-CON) 20 MEQ packet 20 mEq by Per Feeding Tube route daily 10/13/21  Yes Alejandro Duncan MD   prochlorperazine (COMPAZINE) 5 MG tablet Take 1 tablet (5 mg) by mouth every 6 hours as needed for nausea or vomiting 10/12/21  Yes Alejandro Duncan MD   protein modular (PROSOURCE TF FREE) LIQD 1 packet by Per Feeding Tube route daily 10/13/21  Yes Alejandro Duncan MD   sevelamer carbonate, RENVELA, 0.8 GM PACK Packet 2 packets (1.6 g) by Oral or Feeding Tube route 3 times daily 10/12/21  Yes Alejandro Duncan MD   silver nitrate (ARZOL) 75-25 % miscellaneous Apply topically daily as needed for wound care 10/12/21  Yes Alejandro Duncan MD   sodium bicarbonate 325 MG tablet 1 tablet (325 mg) by Per Feeding Tube route every 4 hours  10/12/21  Yes Alejandro Duncan MD   thiamine (B-1) 100 MG tablet 1 tablet (100 mg) by Oral or Feeding Tube route daily 10/13/21  Yes Alejandro Duncan MD   zinc oxide (DESITIN) 40 % external ointment Apply topically every hour as needed for irritation 10/12/21  Yes Alejandro Duncan MD   ALPRAZolam (XANAX) 0.5 MG tablet Take 0.5 mg by mouth    Reported, Patient   amphetamine-dextroamphetamine (ADDERALL XR) 30 MG 24 hr capsule Take 1 capsule (30 mg) by mouth every morning 7/7/22   Kole Gomez MD   Buprenorphine HCl (BELBUCA) 150 MCG FILM buccal film Place 1 Film (150 mcg) inside cheek every 12 hours 7/7/22   Kole Gomez MD   Dextroamphetamine Sulfate 20 MG TABS Take 20 mg by mouth daily at 2 pm One tab at 2 pm 7/7/22   Kole Gomez MD   DULoxetine (CYMBALTA) 30 MG capsule Take 30 mg by mouth    Reported, Patient   gabapentin (NEURONTIN) 300 MG capsule One tab at bedtime for 3 days, then one bid 7/7/22   Kole Gomez MD   hydrOXYzine (ATARAX) 25 MG tablet 2 tablets (50 mg) by Oral or Feeding Tube route nightly as needed for anxiety or other (adjuvant pain) 7/7/22   Kole Gomez MD   lactulose (CHRONULAC) 10 GM/15ML solution Take 20 g by mouth 12/16/21   Reported, Patient   rOPINIRole (REQUIP) 0.25 MG tablet Take 0.25 mg by mouth 12/16/21   Reported, Patient   torsemide (DEMADEX) 20 MG tablet Take 40 mg by mouth 12/16/21   Reported, Patient   vitamin (B COMPLEX) capsule Take 1 each by mouth 12/16/21   Reported, Patient       Previous Transplant Hx: No    Cardiovascular Hx:       h/o Cardiac Issues: No       Exercise Tolerance: no chest pain or shortness of breath with exertion.    Potential Donor(s): No    ROS:    REVIEW OF SYSTEMS (check box if normal)  [x]                GENERAL  [x]                  PULMONARY [x]                 GENITOURINARY  [x]                 CNS                 [x]                  CARDIAC  [x]                  ENDOCRINE  [x]                 EARS,NOSE,THROAT [x]                 "  GASTROINTESTINAL [x]                  NEUROLOGIC    [x]                 MUSCLOSKELTAL  [x]                   HEMATOLOGY    Examination:     Vitals:  /75   Pulse 104   Ht 1.702 m (5' 7\")   Wt 74.4 kg (164 lb)   SpO2 97%   BMI 25.69 kg/m      GENERAL APPEARANCE: alert and no distress  EYES: PERRL  HENT: mouth without ulcers or lesions  NECK: supple, no adenopathy  RESP: lungs clear to auscultation - no rales, rhonchi or wheezes  CV: regular rhythm, normal rate, no rub   ABDOMEN:  soft, nontender, no HSM or masses and bowel sounds normal  MS: extremities normal- no gross deformities noted, no evidence of inflammation in joints, no muscle tenderness  SKIN: no rash  NEURO: Normal strength and tone, sensory exam grossly normal, mentation intact and speech normal  PSYCH: mentation appears normal. and affect normal/bright    I had a long discussion with the patient regarding liver transplantation which included but was not limited to  the following points:    1. Liver transplant selection committee process.  2. The federal rules for cadaveric waiting list, the size and blood type matching of the organ. The availability of living-related donor transplantation.  3. The types of donors: brain death donors, non-heart beating donors, partial liver grafts: splits and living donor grafts  4. Extended criteria  Donors (older age, steasosis) and the increased  risk of primary non-function using the extended criteria donors  5. The CDC high risk donors,  Risk of donor transmitted infections and donor transmitted malignancy  6. The liver transplant operation and the associated risks and technical complications which can include intraoperative death, post operative death,  Primary non-function, bleeding requiring re-operations, arterial and biliary complications, bowel perforations, and intra abdominal abscess. Some of these complicaitons may require a second operation.  7. The postoperative course, the ICU stay and risk " of postoperative complications which can include sepsis, MI, stroke, brain injury, pneumonia, pleural effusions, and renal dysfunction.  8. The current 1 year and 5 year graft and patient survivals.  9. The need for life long immunosuppressive therapy and the side effects of these medications, including the possibility of toxicity, opportunistic infections, risk of cancer including lymphoma, and the possibility of rejection even if the patient is taking the medication exactly as prescribed.  10. The need for compliance with medications and follow-up visits in the clinic and thereafter.  11. The patient and family understand these risks and wish to proceed to transplantation

## 2022-07-13 ENCOUNTER — VIRTUAL VISIT (OUTPATIENT)
Dept: GASTROENTEROLOGY | Facility: CLINIC | Age: 32
End: 2022-07-13
Payer: COMMERCIAL

## 2022-07-13 VITALS — BODY MASS INDEX: 26.16 KG/M2 | WEIGHT: 167 LBS

## 2022-07-13 DIAGNOSIS — K70.31 ALCOHOLIC CIRRHOSIS OF LIVER WITH ASCITES (H): Primary | ICD-10-CM

## 2022-07-13 DIAGNOSIS — F10.11 HISTORY OF ALCOHOL ABUSE: ICD-10-CM

## 2022-07-13 PROCEDURE — 99214 OFFICE O/P EST MOD 30 MIN: CPT | Mod: 95 | Performed by: INTERNAL MEDICINE

## 2022-07-13 ASSESSMENT — PAIN SCALES - GENERAL: PAINLEVEL: SEVERE PAIN (6)

## 2022-07-13 NOTE — LETTER
7/13/2022     RE: Dax Villareal  [address]  Room [address] [place] [id number]    Dear Colleague,    Thank you for referring your patient, Dax Villareal, to the [facility] [place]. Please see a copy of my visit note below.    Dax is a 31 year old who is being evaluated via a billable video visit.      Patient denies any changes since echeck-in regarding medication and allergies and states all information entered during echeck-in remains accurate.    How would you like to obtain your AVS? MyChart  If the video visit is dropped, the invitation should be resent by: Send to e-mail at: [email or web address]  Will anyone else be joining your video visit? No     Belen Curtis      Video-Visit Details  Video Start Time: 10:12  Type of service:  Video Visit  Video End Time:10:30  Originating Location (pt. Location): Home  Distant Location (provider location):  [facility] [place]   Platform used for Video Visit: [employer]     GASTROENTEROLOGY Video Follow up visit    CC/REFERRING MD:    Brianna Tim    HISTORY OF PRESENT ILLNESS:    Dax Villareal is a 31 year old male who is being evaluated via a billable video visit.      Last GI clinic was [facility] eval 6/28/22.  PEG-J removed Feb or March 2022.    Denies abdominal pain, states over all feels well. No fevers.  Last drink etoh was 14 months ago    social hx/  States he is excited to move from nursing home into his own apartment      I have reviewed and updated the patient's Past Medical History, Social History, Family History and Medication List.    ALLERGIES  Tylenol [acetaminophen]    PE/  Gen: pleasant NAD. Has a hard time finding words  Neuro: no asterixis  HEENT: hearing intact  Psych: AOx3, normal mood and affect    PERTINENT STUDIES have been reviewed.    Impression/Recommendations:    Dax Villareal is a 31 year old male with etoh cirrhosis most recent MELD 24.  Last GI clinic was OLT  eval.  May also need eval for kidney transplant. The patient denies abdominal pain today.  -- I will discuss the case with Dr. Hurley  -- I will place the order for upper endoscopy recommended at last GI clinic appt    Appointment duration: 18 minutes    RTC prn    Thank you for this consultation.  It was a pleasure to participate in the care of this patient; please contact us with any further questions.      Again, thank you for allowing me to participate in the care of your patient.      Sincerely,      Marcia Culver MD

## 2022-07-13 NOTE — PROGRESS NOTES
Dax is a 31 year old who is being evaluated via a billable video visit.      Patient denies any changes since echeck-in regarding medication and allergies and states all information entered during echeck-in remains accurate.    How would you like to obtain your AVS? MyChart  If the video visit is dropped, the invitation should be resent by: Send to e-mail at: [email or web address]  Will anyone else be joining your video visit? No       Belen Acevedo      Video-Visit Details    Video Start Time: 10:12    Type of service:  Video Visit    Video End Time:10:30    Originating Location (pt. Location): Home    Distant Location (provider location):  [facility] [place]     Platform used for Video Visit: [email or web address]       GASTROENTEROLOGY Video Follow up visit    CC/REFERRING MD:    Brianna Tim    HISTORY OF PRESENT ILLNESS:    Dax Villareal is a 31 year old male who is being evaluated via a billable video visit.      Last GI clinic was OLT eval 6/28/22.  PEG-J removed Feb or March 2022.    Denies abdominal pain, states over all feels well. No fevers.  Last drink etoh was 14 months ago    social hx/  States he is excited to move from nursing home into his own apartment      I have reviewed and updated the patient's Past Medical History, Social History, Family History and Medication List.    ALLERGIES  Tylenol [acetaminophen]    PE/  Gen: pleasant NAD. Has a hard time finding words  Neuro: no asterixis  HEENT: hearing intact  Psych: AOx3, normal mood and affect    PERTINENT STUDIES have been reviewed.    Impression/Recommendations:    Dax Villareal is a 31 year old male with etoh cirrhosis most recent MELD 24.  Last GI clinic was OLT eval.  May also need eval for kidney transplant. The patient denies abdominal pain today.  -- I will discuss the case with Dr. Hurley  -- I will place the order for upper endoscopy recommended at last GI clinic appt      Appointment duration: 18 minutes    RTC  prn    Thank you for this consultation.  It was a pleasure to participate in the care of this patient; please contact us with any further questions.

## 2022-07-14 ENCOUNTER — VIRTUAL VISIT (OUTPATIENT)
Dept: BEHAVIORAL HEALTH | Facility: CLINIC | Age: 32
End: 2022-07-14
Payer: COMMERCIAL

## 2022-07-14 DIAGNOSIS — F10.11 HISTORY OF ALCOHOL ABUSE: ICD-10-CM

## 2022-07-14 DIAGNOSIS — F41.1 GAD (GENERALIZED ANXIETY DISORDER): Primary | ICD-10-CM

## 2022-07-14 PROBLEM — G89.29 CHRONIC PAIN: Status: ACTIVE | Noted: 2022-07-14

## 2022-07-14 PROBLEM — K86.89 PANCREATIC INSUFFICIENCY: Status: ACTIVE | Noted: 2022-07-14

## 2022-07-14 PROBLEM — I31.39 PERICARDIAL EFFUSION: Status: RESOLVED | Noted: 2021-11-03 | Resolved: 2022-07-14

## 2022-07-14 PROBLEM — J90 RECURRENT PLEURAL EFFUSION: Status: ACTIVE | Noted: 2022-07-14

## 2022-07-14 PROBLEM — R09.02 HYPOXIA: Status: RESOLVED | Noted: 2022-02-04 | Resolved: 2022-07-14

## 2022-07-14 PROBLEM — I31.4 CARDIAC TAMPONADE: Status: RESOLVED | Noted: 2021-10-28 | Resolved: 2022-07-14

## 2022-07-14 PROBLEM — F10.930 ALCOHOL WITHDRAWAL SYNDROME WITHOUT COMPLICATION (H): Status: RESOLVED | Noted: 2019-11-04 | Resolved: 2022-07-14

## 2022-07-14 PROBLEM — E87.20 METABOLIC ACIDOSIS: Status: ACTIVE | Noted: 2022-07-14

## 2022-07-14 PROBLEM — N18.9 ANEMIA IN CHRONIC KIDNEY DISEASE: Status: ACTIVE | Noted: 2022-07-14

## 2022-07-14 PROBLEM — E46 MALNUTRITION (H): Status: ACTIVE | Noted: 2022-07-14

## 2022-07-14 PROBLEM — I95.9 HYPOTENSION, UNSPECIFIED HYPOTENSION TYPE: Status: ACTIVE | Noted: 2022-07-14

## 2022-07-14 PROBLEM — E66.9 OBESITY: Status: RESOLVED | Noted: 2020-12-17 | Resolved: 2022-07-14

## 2022-07-14 PROBLEM — F17.200 CURRENT SMOKER: Status: ACTIVE | Noted: 2022-07-14

## 2022-07-14 PROBLEM — D63.1 ANEMIA IN CHRONIC KIDNEY DISEASE: Status: ACTIVE | Noted: 2022-07-14

## 2022-07-14 PROCEDURE — 90832 PSYTX W PT 30 MINUTES: CPT | Mod: 95 | Performed by: PSYCHOLOGIST

## 2022-07-14 NOTE — PROGRESS NOTES
"[facility]: Integrated Behavioral Health  2022      Behavioral Health Clinician Progress Note    Patient Name: Dax Villareal           Service Type: Phone Visit      Service Location:  Phone Visit      Session Start Time: 1:45  Session End Time: 2:20      Session Length: 16 - 37      Attendees: Patient    Visit Activities (Refresh list every visit): BHC Only and Phone Encounter    Service Modality:  Phone Visit:      Provider verified identity through the following two step process.  Patient provided:  Patient  and Patient address    The patient has been notified of the following:      \"We have found that certain health care needs can be provided without the need for a face to face visit.  This service lets us provide the care you need with a phone conversation.       I will have full access to your [facility] medical record during this entire phone call.   I will be taking notes for your medical record.      Since this is like an office visit, we will bill your insurance company for this service.       There are potential benefits and risks of telephone visits (e.g. limits to patient confidentiality) that differ from in-person visits.?Confidentiality still applies for telephone services, and nobody will record the visit.  It is important to be in a quiet, private space that is free of distractions (including cell phone or other devices) during the visit.??      If during the course of the call I believe a telephone visit is not appropriate, you will not be charged for this service\"     Consent has been obtained for this service by care team member: Yes     Diagnostic Assessment Date: IP  Treatment Plan Review Date: IP  See Flowsheets for today's PHQ-9 and DIMITRI-7 results  Previous PHQ-9: No flowsheet data found.  Previous DIMITRI-7: No flowsheet data found.    DATA  Extended Session (60+ minutes): No  Interactive Complexity: No  Crisis: No    Treatment " Objective(s) Addressed in This Session:  Target Behavior(s): disease management/lifestyle changes      Psychological distress related to Chronic Disease Management    Current Stressors / Issues:  [facility] met with Dax today over the telephone for a brief initial session. Dax indicated he bypassed the screening instruments for the assessment so a DA was not feasible to complete today. Session consisted of reviewing Dax's recommendation by his SOT care team to consult with this writer for therapy due to his history of alcohol misuse. He was referred by [facility] for care. Reviewed Dax's history with starting with SOT - records also show he has a history of alcohol misuse with date of last use in May of 2021. There is also mention in his EHR of tobacco use, smoking 5-8 cigarettes daily. Inquired with Dax about the documented concerns about anxiety, though he denied significant concern with anxiety and notes he believes he murali well with stress. He is currently residing in a nursing facility and we discussed his experiences with living there. Began to gather information about his social history as well.     Plan is to complete DA, get a jorge workup of his documented concerns with anxiety and continue supporting his commitment to sobriety from alcohol. Smoking cessation is also highly recommended, though we didn't get into this today.       Progress on Treatment Objective(s) / Homework:  New Objective established this session - PRECONTEMPLATION (Not seeing need for change); Intervened by educating the patient about the effects of current behavior on health.  Evoked information about reasons to continue behavior, express concern / recommendations, and explored any change talk    Motivational Interviewing    MI Intervention: Expressed Empathy/Understanding, Supported Autonomy, Collaboration, Evocation, Permission to raise concern or advise, Open-ended questions, Reflections: simple and complex, Rolled with  resistance: Emphasized patient autonomy, Simple reflection, Complex reflection, Shifted topic to defuse resistance, Reframed sustain talk in the direction of change and Evoked patient agenda, Change talk (evoked) and Reframe     Change Talk Expressed by the Patient: NA - Precontemplative    Provider Response to Change Talk: E - Evoked more info from patient about behavior change, A - Affirmed patient's thoughts, decisions, or attempts at behavior change, R - Reflected patient's change talk and S - Summarized patient's change talk statements    Also provided psychoeducation about behavioral health condition, symptoms, and treatment options    Care Plan review completed: Yes    Medication Review:  No changes to current psychiatric medication(s)    Medication Compliance:  Yes    Changes in Health Issues:   None reported    Chemical Use Review:   Substance Use: Chemical use reviewed, no active concerns identified      Tobacco Use: Yes, stable use.  Patient reports frequency of use 3-5 cigarettes daily. Patient declined discussion at this time    Assessment: Current Emotional / Mental Status (status of significant symptoms):  Risk status (Self / Other harm or suicidal ideation)  Patient has had a history of suicidal ideation: passive thoughts and denies a history of suicide attempts, self-injurious behavior, homicidal ideation, homicidal behavior and and other safety concerns  Patient denies current fears or concerns for personal safety.  Patient denies current or recent suicidal ideation or behaviors.  Patient denies current or recent homicidal ideation or behaviors.  Patient denies current or recent self injurious behavior or ideation.  Patient denies other safety concerns.  A safety and risk management plan has not been developed at this time, however patient was encouraged to call [facility] should there be a change in any of these risk factors.     [place] Suicide Severity Rating Scale (Short  Version)  [place] Suicide Severity Rating (Short Version) 10/6/2020 12/9/2020 1/11/2021 2/9/2021 6/28/2021   Over the past 2 weeks have you felt down, depressed, or hopeless? no yes no no yes   Over the past 2 weeks have you had thoughts of killing yourself? no yes no no no   Have you ever attempted to kill yourself? no no no no no   Required Interventions - Provider notified - - -     [place] Suicide Severity Rating Scale (Lifetime/Recent)No flowsheet data found.      Appearance:   Unable to assess   Eye Contact:   Unable to assess   Psychomotor Behavior: Unable to assess   Attitude:   Cooperative   Orientation:   All  Speech   Rate / Production: Normal    Volume:  Normal   Mood:    Normal  Affect:    Appropriate   Thought Content:  Clear   Thought Form:  Coherent  Logical   Insight:    Fair     Diagnoses:  1. DIMITRI (generalized anxiety disorder)    2. History of alcohol abuse      Per hx and records    Collateral Reports Completed:  Routed note to Care Team Member(s)    Plan: (Homework, other):  Patient was given information about behavioral services and encouraged to schedule a follow up appointment with the clinic [facility] in 2 weeks.  Will complete DA. He was also given information about mental health symptoms and treatment options .  CD Recommendations: Maintain Sobriety.     Kun Pelayo Psy.D, LP   Behavioral Health Clinician   [facility]     7/14/2022

## 2022-07-14 NOTE — LETTER
"2022     RE: Dax Villareal  [email or web address]  [address] [place] [address]     Dear Colleague,    Thank you for referring your patient, Dax Villareal, to the [facility] MENTAL HEALTH & ADDICTION SERVICES at [facility]. Please see a copy of my visit note below.    [facility]: Integrated Behavioral Health  2022      Behavioral Health Clinician Progress Note    Patient Name: Dax Villareal           Service Type: Phone Visit      Service Location:  Phone Visit      Session Start Time: 1:45  Session End Time: 2:20      Session Length: 16 - 37      Attendees: Patient    Visit Activities (Refresh list every visit): BHC Only and Phone Encounter    Service Modality:  Phone Visit:      Provider verified identity through the following two step process.  Patient provided:  Patient  and Patient address    The patient has been notified of the following:      \"We have found that certain health care needs can be provided without the need for a face to face visit.  This service lets us provide the care you need with a phone conversation.       I will have full access to your [facility] medical record during this entire phone call.   I will be taking notes for your medical record.      Since this is like an office visit, we will bill your insurance company for this service.       There are potential benefits and risks of telephone visits (e.g. limits to patient confidentiality) that differ from in-person visits.?Confidentiality still applies for telephone services, and nobody will record the visit.  It is important to be in a quiet, private space that is free of distractions (including cell phone or other devices) during the visit.??      If during the course of the call I believe a telephone visit is not appropriate, you will not be charged for this service\"     Consent has been obtained " for this service by care team member: Yes     Diagnostic Assessment Date: IP  Treatment Plan Review Date: IP  See Flowsheets for today's PHQ-9 and DIMITRI-7 results  Previous PHQ-9: No flowsheet data found.  Previous DIMITRI-7: No flowsheet data found.    DATA  Extended Session (60+ minutes): No  Interactive Complexity: No  Crisis: No    Treatment Objective(s) Addressed in This Session:  Target Behavior(s): disease management/lifestyle changes      Psychological distress related to Chronic Disease Management    Current Stressors / Issues:  [facility] met with Dax today over the telephone for a brief initial session. Dax indicated he bypassed the screening instruments for the assessment so a DA was not feasible to complete today. Session consisted of reviewing Dax's recommendation by his SOT care team to consult with this writer for therapy due to his history of alcohol misuse. He was referred by RAD[facility] for care. Reviewed Dax's history with starting with SOT - records also show he has a history of alcohol misuse with date of last use in May of 2021. There is also mention in his EHR of tobacco use, smoking 5-8 cigarettes daily. Inquired with Dax about the documented concerns about anxiety, though he denied significant concern with anxiety and notes he believes he murali well with stress. He is currently residing in a nursing facility and we discussed his experiences with living there. Began to gather information about his social history as well.     Plan is to complete DA, get a jorge workup of his documented concerns with anxiety and continue supporting his commitment to sobriety from alcohol. Smoking cessation is also highly recommended, though we didn't get into this today.       Progress on Treatment Objective(s) / Homework:  New Objective established this session - PRECONTEMPLATION (Not seeing need for change); Intervened by educating the patient about the effects of current behavior on health.  Evoked  information about reasons to continue behavior, express concern / recommendations, and explored any change talk    Motivational Interviewing    MI Intervention: Expressed Empathy/Understanding, Supported Autonomy, Collaboration, Evocation, Permission to raise concern or advise, Open-ended questions, Reflections: simple and complex, Rolled with resistance: Emphasized patient autonomy, Simple reflection, Complex reflection, Shifted topic to defuse resistance, Reframed sustain talk in the direction of change and Evoked patient agenda, Change talk (evoked) and Reframe     Change Talk Expressed by the Patient: NA - Precontemplative    Provider Response to Change Talk: E - Evoked more info from patient about behavior change, A - Affirmed patient's thoughts, decisions, or attempts at behavior change, R - Reflected patient's change talk and S - Summarized patient's change talk statements    Also provided psychoeducation about behavioral health condition, symptoms, and treatment options    Care Plan review completed: Yes    Medication Review:  No changes to current psychiatric medication(s)    Medication Compliance:  Yes    Changes in Health Issues:   None reported    Chemical Use Review:   Substance Use: Chemical use reviewed, no active concerns identified      Tobacco Use: Yes, stable use.  Patient reports frequency of use 3-5 cigarettes daily. Patient declined discussion at this time    Assessment: Current Emotional / Mental Status (status of significant symptoms):  Risk status (Self / Other harm or suicidal ideation)  Patient has had a history of suicidal ideation: passive thoughts and denies a history of suicide attempts, self-injurious behavior, homicidal ideation, homicidal behavior and and other safety concerns  Patient denies current fears or concerns for personal safety.  Patient denies current or recent suicidal ideation or behaviors.  Patient denies current or recent homicidal ideation or behaviors.  Patient  denies current or recent self injurious behavior or ideation.  Patient denies other safety concerns.  A safety and risk management plan has not been developed at this time, however patient was encouraged to call [place] [phone] should there be a change in any of these risk factors.     [place] Suicide Severity Rating Scale (Short Version)  [place] Suicide Severity Rating (Short Version) 10/6/2020 12/9/2020 1/11/2021 2/9/2021 6/28/2021   Over the past 2 weeks have you felt down, depressed, or hopeless? no yes no no yes   Over the past 2 weeks have you had thoughts of killing yourself? no yes no no no   Have you ever attempted to kill yourself? no no no no no   Required Interventions - Provider notified - - -     [place] Suicide Severity Rating Scale (Lifetime/Recent)No flowsheet data found.      Appearance:   Unable to assess   Eye Contact:   Unable to assess   Psychomotor Behavior: Unable to assess   Attitude:   Cooperative   Orientation:   All  Speech   Rate / Production: Normal    Volume:  Normal   Mood:    Normal  Affect:    Appropriate   Thought Content:  Clear   Thought Form:  Coherent  Logical   Insight:    Fair     Diagnoses:  1. DIMITRI (generalized anxiety disorder)    2. History of alcohol abuse      Per hx and records    Collateral Reports Completed:  Routed note to Care Team Member(s)    Plan: (Homework, other):  Patient was given information about behavioral services and encouraged to schedule a follow up appointment with the clinic [facility] in 2 weeks.  Will complete DA. He was also given information about mental health symptoms and treatment options .  CD Recommendations: Maintain Sobriety.     Kun Pelayo Psy.D, LP   Behavioral Health Clinician   [facility] [place]     7/14/2022

## 2022-07-20 ENCOUNTER — TELEPHONE (OUTPATIENT)
Dept: GASTROENTEROLOGY | Facility: CLINIC | Age: 32
End: 2022-07-20

## 2022-07-20 ENCOUNTER — COMMITTEE REVIEW (OUTPATIENT)
Dept: TRANSPLANT | Facility: CLINIC | Age: 32
End: 2022-07-20

## 2022-07-20 ENCOUNTER — TELEPHONE (OUTPATIENT)
Dept: TRANSPLANT | Facility: CLINIC | Age: 32
End: 2022-07-20

## 2022-07-20 DIAGNOSIS — K76.89 LIVER DYSFUNCTION: ICD-10-CM

## 2022-07-20 DIAGNOSIS — N18.6 END STAGE RENAL DISEASE (H): ICD-10-CM

## 2022-07-20 DIAGNOSIS — Z01.818 PRE-TRANSPLANT EVALUATION FOR KIDNEY TRANSPLANT: ICD-10-CM

## 2022-07-20 DIAGNOSIS — Z76.82 ORGAN TRANSPLANT CANDIDATE: ICD-10-CM

## 2022-07-20 NOTE — TELEPHONE ENCOUNTER
Advanced Endoscopy     Referring provider:  RUCHI Lee    Referred to: [facility]     Provider Requested:  NA     Referral Received: 07/20/22     Records received: Epic     Images received: CareEverywhere    Evaluation for: pancreatitis team to evaluate N/V weight loss     Clinical History (per RN review):     Per SOT Committee Meeting    # Necrotizing pancreatitis:  s/p multiple endoscopic necrosectomy's and percutaneous drainage of the large necrotic cavity, last in August 2021. Complains of daily abdominal pain. Will need review by pancreatitis clinic. Also c/o N/V and weight loss     Pt seen by Marcia Culver 7/13/22  Last GI clinic was OLT eval 6/28/22.  PEG-J removed Feb or March 2022.     Denies abdominal pain, states over all feels well. No fevers.  Last drink etoh was 14 months ago    -- I will place the order for upper endoscopy recommended at last GI clinic appt    MD review date: 07/20/22    MD Decision for clinic consultation/Orders:            Referral updates/Patient contacted:

## 2022-07-20 NOTE — TELEPHONE ENCOUNTER
Called Dax to review the outcome of the Selection Committee from today 7/20/2022. A decision on candidacy has not been made until a number of things can be sorted out. He needs a CD assessment and to identify a care partner. Once identified that person will be called to review what is actually expected of them. He has lost 17 lb in 9 days and that needs to be addressed by having him see the pancreatitis team for his pancreatitis. He was not in favor of another feeding tube. He feels once he is living on his own he will eat better. He tested frail and we talked about how difficult it is to recover from a major surgery malnourished and frail. The goal is for him to be able to walk at least 1/2 block without a walker. Since living in a nursing home he felt his options to exercise were limited and thinks he will be getting out and more mobile once he moves into his own apartment. He believes this will happen next week. He does have an appointment coming up with his PCP and I suggested he talk to them about PT.Once all of the above is complete we will ask him to return to clinic and see a surgeon for another assessment of his condition and frailty. Transplant summary letter sent.

## 2022-07-20 NOTE — COMMITTEE REVIEW
Abdominal Committee Review Note     Evaluation Date: 7/12/2022  Committee Review Date: 7/20/2022    Organ being evaluated for: Kidney    Transplant Phase: Evaluation  Transplant Status: Active    Transplant Coordinator: Franchesca Flores  Transplant Surgeon:       Referring Physician: Referred Self    Primary Diagnosis:   Secondary Diagnosis:     Committee Review Members:  Nephrology Yusuf Mott MD, Sharad Montero, APRN CNP, Casey Amaral MD   Pharmacy Valentina Hawley, [facility]   Transplant Anali Fowler PA-C, Shaina Mora, ROB, Chaparrita Manjarrez, RN, Ute Ríos, DOROTHY, Debra Bender RN, Ailyn Cole, RN, Franchesca Flores, RN   Transplant Surgery Demetrio Yang MD       Transplant Eligibility: Irreversible chronic kidney disease treated w/dialysis or expected need for dialysis    Committee Review Decision: Needs Re-presentation    Relative Contraindications: Other, chem dep, frail, support system    Absolute Contraindications: None    Committee Chair Demetrio Yang MD verbally attested to the committee's decision.    Committee Discussion Details: Reviewed medical records and evaluation results to date. No decision on candidacy at this time. He needs a chemical dependency assessment, identify a caregiver and local support person, weight needs to stabilize, needs to be seen in pancreatitis clinic. Once the above is met will need to be seen in clinic by surgeon. Needs to be able to walk 1/2 block and not be frail. Patient will be called and transplant summary letter will be sent.

## 2022-07-20 NOTE — LETTER
July 20, 2022    Dax Villareal  [address]  [address] [place] [id number]    Dear Dax,    It was a pleasure to start working with you toward the goal of a kidney transplant. Your pre transplant evaluation began on July 12, 2022. Your evaluation results were discussed at our Multidisciplinary Selection Committee on July 20, 2022. A decision on candidacy is on hold for now. There are a number of issues that need to be sorted out first.    1. Following the recommendation of social work you will need a chemical dependency assessment. Call me when complete so I can obtain the records.   2. You will need to identify a care partner that has your best interest at heart. This person would be someone that is local and could be relied upon to help you after transplant. Once you identify this person or persons we would like to talk with them to review what is actually needed from them.  3. According to your chart you have lost 17 pounds in 9 days and that is concerning. You weighed 164 on 6/28/2022 and 147 on 7/7/2022. Your weight needs to be stable.   4. We are asking you to be seen in the pancreatitis clinic to assess the degree your pancreas is damaged and what to do about your nutrition. Scheduling will call you.  5. Once the above is complete we will ask you to come in and see a transplant surgeon in person and to reassess how frail you are. Right now you test frail and that is very difficult to recover from a transplant in that state.The goal is to be able to walk at least a 1/2 block to start.    Once the above is completed we will continue on with your evaluation for kidney transplant.    If you have any questions please call me, e mail or [employer] message me.    Sincerely,     Susana Flores, RN, BSN Transplant Coordinator  [address] [place] [address]  [address]  [place], [place] [address]  Phone [phone]  Fax [phone]  [email or web address]    CC:Brianna Tim, Ramez Kirkland, [facility]  dialysis

## 2022-07-21 ENCOUNTER — TELEPHONE (OUTPATIENT)
Dept: FAMILY MEDICINE | Facility: CLINIC | Age: 32
End: 2022-07-21

## 2022-07-21 ENCOUNTER — VIRTUAL VISIT (OUTPATIENT)
Dept: FAMILY MEDICINE | Facility: CLINIC | Age: 32
End: 2022-07-21
Payer: COMMERCIAL

## 2022-07-21 ENCOUNTER — VIRTUAL VISIT (OUTPATIENT)
Dept: BEHAVIORAL HEALTH | Facility: CLINIC | Age: 32
End: 2022-07-21
Payer: COMMERCIAL

## 2022-07-21 DIAGNOSIS — G89.28 OTHER CHRONIC POSTPROCEDURAL PAIN: Primary | ICD-10-CM

## 2022-07-21 DIAGNOSIS — G89.4 CHRONIC PAIN SYNDROME: Primary | ICD-10-CM

## 2022-07-21 DIAGNOSIS — K70.30 ALCOHOLIC CIRRHOSIS OF LIVER WITHOUT ASCITES (H): ICD-10-CM

## 2022-07-21 DIAGNOSIS — N18.6 ESRD (END STAGE RENAL DISEASE) ON DIALYSIS (H): ICD-10-CM

## 2022-07-21 DIAGNOSIS — Z99.2 ESRD (END STAGE RENAL DISEASE) ON DIALYSIS (H): ICD-10-CM

## 2022-07-21 DIAGNOSIS — G62.9 PERIPHERAL POLYNEUROPATHY: ICD-10-CM

## 2022-07-21 DIAGNOSIS — M24.576 CONTRACTURE OF JOINT OF FOOT, UNSPECIFIED LATERALITY: ICD-10-CM

## 2022-07-21 DIAGNOSIS — F10.11 ALCOHOL ABUSE, IN REMISSION: ICD-10-CM

## 2022-07-21 DIAGNOSIS — F41.1 GAD (GENERALIZED ANXIETY DISORDER): Primary | ICD-10-CM

## 2022-07-21 PROCEDURE — 90832 PSYTX W PT 30 MINUTES: CPT | Mod: 95 | Performed by: PSYCHOLOGIST

## 2022-07-21 PROCEDURE — 99214 OFFICE O/P EST MOD 30 MIN: CPT | Mod: 95 | Performed by: FAMILY MEDICINE

## 2022-07-21 ASSESSMENT — ANXIETY QUESTIONNAIRES
GAD7 TOTAL SCORE: 7
7. FEELING AFRAID AS IF SOMETHING AWFUL MIGHT HAPPEN: SEVERAL DAYS
1. FEELING NERVOUS, ANXIOUS, OR ON EDGE: SEVERAL DAYS
7. FEELING AFRAID AS IF SOMETHING AWFUL MIGHT HAPPEN: SEVERAL DAYS
GAD7 TOTAL SCORE: 7
8. IF YOU CHECKED OFF ANY PROBLEMS, HOW DIFFICULT HAVE THESE MADE IT FOR YOU TO DO YOUR WORK, TAKE CARE OF THINGS AT HOME, OR GET ALONG WITH OTHER PEOPLE?: SOMEWHAT DIFFICULT
6. BECOMING EASILY ANNOYED OR IRRITABLE: SEVERAL DAYS
IF YOU CHECKED OFF ANY PROBLEMS ON THIS QUESTIONNAIRE, HOW DIFFICULT HAVE THESE PROBLEMS MADE IT FOR YOU TO DO YOUR WORK, TAKE CARE OF THINGS AT HOME, OR GET ALONG WITH OTHER PEOPLE: SOMEWHAT DIFFICULT
4. TROUBLE RELAXING: MORE THAN HALF THE DAYS
GAD7 TOTAL SCORE: 7
2. NOT BEING ABLE TO STOP OR CONTROL WORRYING: SEVERAL DAYS
5. BEING SO RESTLESS THAT IT IS HARD TO SIT STILL: NOT AT ALL
3. WORRYING TOO MUCH ABOUT DIFFERENT THINGS: SEVERAL DAYS

## 2022-07-21 ASSESSMENT — PATIENT HEALTH QUESTIONNAIRE - PHQ9
10. IF YOU CHECKED OFF ANY PROBLEMS, HOW DIFFICULT HAVE THESE PROBLEMS MADE IT FOR YOU TO DO YOUR WORK, TAKE CARE OF THINGS AT HOME, OR GET ALONG WITH OTHER PEOPLE: VERY DIFFICULT
SUM OF ALL RESPONSES TO PHQ QUESTIONS 1-9: 10
SUM OF ALL RESPONSES TO PHQ QUESTIONS 1-9: 10

## 2022-07-21 NOTE — PROGRESS NOTES
"Dax is a 31 year old who is being evaluated via a billable video visit.      How would you like to obtain your AVS? MyChart  If the video visit is dropped, the invitation should be resent by: Text to cell phone: [phone]  Will anyone else be joining your video visit? No        Assessment & Plan     Chronic pain syndrome    This is my first appointment with this patient.  He is under the care of pain management.  His pain management physician is not aware of his desire to start medical marijuana.  We have limited information about his chronic pain in his chart.  I could not find any imaging of his spine or feet in his chart or through care everywhere.  I let the patient know I was unable to certify him at this time.  I have asked the patient to contact his pain management physician and let them know he is looking to be certified for for his pain.  I will need a note or phone call from his pain managed agreed to this plan.      Contracture of joint of foot, unspecified laterality  Is unclear what is caused the contracture of his foot the patient was unable to give a good history regarding this.  I do not see any imaging of the foot upon my chart review    Alcohol abuse, in remission  His history of addiction complicates eligibility for medical marijuana.  We will await pain management physicians opinion    Alcoholic cirrhosis of liver without ascites (H)  Managed by hematology on the transplant list    ESRD (end stage renal disease) on dialysis (H)  Managed by nephrology on the transplant list    This note was created with voice recognition software. Inadvertent grammatical errors, typographical errors, and \"sound a like\" substitutions may occur due to limitations of the software.  Read the note carefully and apply context when erroneous substitutions have occurred. Thank you.       30 minutes spent on the date of the encounter doing chart review, history and exam, documentation and further activities per the " note       Depression Screening Follow Up    PHQ 7/21/2022   PHQ-9 Total Score 10   Q9: Thoughts of better off dead/self-harm past 2 weeks Not at all     Last PHQ-9 7/21/2022   1.  Little interest or pleasure in doing things 1   2.  Feeling down, depressed, or hopeless 1   3.  Trouble falling or staying asleep, or sleeping too much 3   4.  Feeling tired or having little energy 1   5.  Poor appetite or overeating 2   6.  Feeling bad about yourself 0   7.  Trouble concentrating 1   8.  Moving slowly or restless 1   Q9: Thoughts of better off dead/self-harm past 2 weeks 0   PHQ-9 Total Score 10       Follow Up Actions Taken  Managed by psychiatry       No follow-ups on file.    Sania Muñoz DO  [facility]    David Verduzco is a 31 year old, presenting for the following health issues: The patient was late for his appointment and was seen after the last appointment of the day  Consult For (Medical Marijuana Certification)      History of Present Illness       Back Pain:  He presents for follow up of back pain. Patient's back pain is a chronic problem.  Location of back pain:  Right middle of back, left middle of back, right upper back, left upper back, right shoulder, left shoulder, right buttock, left buttock, right hip and left hip  Description of back pain: dull ache, gnawing, sharp and other  Back pain spreads: nowhere    Since patient first noticed back pain, pain is: unchanged  Does back pain interfere with his job:  Not applicable      CKD: He is not using over the counter pain medicine.     Mental Health Follow-up:  Patient presents to follow-up on Anxiety.    Patient's anxiety since last visit has been:  Worse  The patient is having other symptoms associated with anxiety.  Any significant life events: financial concerns, housing concerns, grief or loss and health concerns  Patient is feeling anxious or having panic attacks.  Patient has no concerns about alcohol or drug  "use.    Headaches:   Since the patient's last clinic visit, headaches are: worsened  The patient is getting headaches:  Daily, 3 or more times.  He is not able to do normal daily activities when he has a migraine.  The patient is taking the following rescue/relief medications:  Tylenol   Patient states \"I get only a small amount of relief\" from the rescue/relief medications.   The patient is taking the following medications to prevent migraines:  No medications to prevent migraines  In the past 4 weeks, the patient has gone to an Urgent Care or Emergency Room 0 times times due to headaches.    Vascular Disease:  He presents for follow up of vascular disease.  He never takes nitroglycerin. He is not taking daily aspirin.    Reason for visit:  Meet Dr and discuss my case. Including switching to [facility] , prescriptions transfer from [facility] date of release 8/2. Change of medicide.    He eats 2-3 servings of fruits and vegetables daily.He consumes 3 sweetened beverage(s) daily.He exercises with enough effort to increase his heart rate 20 to 29 minutes per day.  He exercises with enough effort to increase his heart rate 3 or less days per week. He is missing 2 dose(s) of medications per week.  He is not taking prescribed medications regularly due to other.    Today's PHQ-9         PHQ-9 Total Score: 10    PHQ-9 Q9 Thoughts of better off dead/self-harm past 2 weeks :   Not at all    How difficult have these problems made it for you to do your work, take care of things at home, or get along with other people: Very difficult  Today's DIMITRI-7 Score: 7     Referred by Dr. Gomez to talk about possible medical marijuana    He has a pain management physician in [place] [address].      He has a contracture of his feet bilaterally.  He is on buprenorphine and cymbalta for this.            Review of Systems   Constitutional, HEENT, cardiovascular, pulmonary, gi and gu systems are negative, except as otherwise noted.    "   Objective           Vitals:  No vitals were obtained today due to virtual visit.    Physical Exam   GENERAL: Healthy, alert and no distress  EYES: Eyes grossly normal to inspection.  No discharge or erythema, or obvious scleral/conjunctival abnormalities.  RESP: No audible wheeze, cough, or visible cyanosis.  No visible retractions or increased work of breathing.    SKIN: Visible skin clear. No significant rash, abnormal pigmentation or lesions.  NEURO: Cranial nerves grossly intact.  Mentation and speech appropriate for age.  PSYCH: Mentation appears normal, affect normal/bright, judgement and insight intact, normal speech and appearance well-groomed.    [email or web address]    PEG 5              Phone visit due to tech problems    Duration 25 minutes  .  ..

## 2022-07-21 NOTE — TELEPHONE ENCOUNTER
Routing to Dr. Gomez    Willing to place pain management referral?    Pended if agreeable    Patient will be discharging to Nursing home into apartment on 8-2    Patient scheduled 8-3 with you for nursing home follow up.    Patient needs to be followed by pain management and would like to get appointment scheduled.          RN received call from patient.    Patient stated he will be discharging to independent living on 8/2    Patient needs to follow up with PCP to assure medications are transfered from nursing home.    Patient also needs to establish with a pain provider as he has been followed there by pain as well.    RN assisted patient in scheduling appointment for nursing home follow up.    Patient also needing nursing home records tranferred to [place].    RN had TC fax OTIS to nursing home social worker.      RN gave fax number for patient to return Otis    Phil Daniel RN, BSN, PHN  [facility]

## 2022-07-21 NOTE — PROGRESS NOTES
[facility]: Integrated Behavioral Health  2022      Behavioral Health Clinician Progress Note    Patient Name: Dax Villareal           Service Type: Individual      Service Location:  Video Visit      Session Start Time: 12:30  Session End Time: 12:59      Session Length: 16 - 37      Attendees: Patient    Visit Activities (Refresh list every visit): [facility] Only    Service Modality:  Video Visit:      Provider verified identity through the following two step process.  Patient provided:  Patient  and Patient is known previously to provider    Telemedicine Visit: The patient's condition can be safely assessed and treated via synchronous audio and visual telemedicine encounter.      Reason for Telemedicine Visit: Services only offered telehealth    Originating Site (Patient Location): Patient's home    Distant Site (Provider Location): Provider Remote Setting- Home Office    Consent:  The patient/guardian has verbally consented to: the potential risks and benefits of telemedicine (video visit) versus in person care; bill my insurance or make self-payment for services provided; and responsibility for payment of non-covered services.     Patient would like the video invitation sent by:  My Chart    Mode of Communication:  Video Conference via [facility]    As the provider I attest to compliance with applicable laws and regulations related to telemedicine.      Diagnostic Assessment Date: IP  Treatment Plan Review Date: IP  See Flowsheets for today's PHQ-9 and DIMITRI-7 results  Previous PHQ-9: No flowsheet data found.  Previous DIMITRI-7: No flowsheet data found.    DATA  Extended Session (60+ minutes): No  Interactive Complexity: No  Crisis: No    Treatment Objective(s) Addressed in This Session:  Target Behavior(s): disease management/lifestyle changes      Anxiety: will experience a reduction in anxiety and will develop more effective coping skills to manage anxiety  symptoms  Alcohol / Substance Use: will increase understanding of the effects of substance use  will make healthier choices in regard to substance use  Psychological distress related to Chronic Disease Management    Current Stressors / Issues:  [facility] met with Dax today to continue gathering background information, establish rapport, and provide supportive psychotherapy as he proceeds with SOT. Dax states he is feeling more optimistic today because he plans to move out of the nursing home into his own apartment next week. He discussed why he is looking forward to the transition, including having more space of his own and he suspects he will sleep better, see improvements to other health behaviors. Discussed tobacco use in particular. 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 Dax voiced understanding of his need to quit smoking for SOT and we discussed this under an MI framework. Dax notes that after he moves, he believes he will be more ready and confident to quit. Reviewed periods of sobriety from tobacco in the past, if there were any strengths/resources he could draw upon again this time. We identified a few barriers to quitting for him, including how he enjoys the social component of smoking. He suspects this will change at his apartment, as it does not permit smoking on the premises.     Additionally discussed role of anxiety for him, gathered more information about his difficulties with this. Notes nervousness, restlessness, some occasional worry. Notes tobacco use helps him relax though he identified that quitting might help him reduce stress in the long-term. He voiced interest in returning to walking and exercise in the future, which we agreed would assist him in managing anxiety more effectively.       Progress on Treatment Objective(s) / Homework:  Minimal progress - CONTEMPLATION (Considering change and yet undecided);  Intervened by assessing the negative and positive thinking (ambivalence) about behavior change    Motivational Interviewing    MI Intervention: Expressed Empathy/Understanding, Supported Autonomy, Collaboration, Evocation, Permission to raise concern or advise, Open-ended questions, Reflections: simple and complex, Rolled with resistance: Emphasized patient autonomy, Simple reflection, Complex reflection, Shifted topic to defuse resistance, Reframed sustain talk in the direction of change and Evoked patient agenda, Change talk (evoked) and Reframe     Change Talk Expressed by the Patient: NA - Precontemplative    Provider Response to Change Talk: E - Evoked more info from patient about behavior change, A - Affirmed patient's thoughts, decisions, or attempts at behavior change, R - Reflected patient's change talk and S - Summarized patient's change talk statements    Also provided psychoeducation about behavioral health condition, symptoms, and treatment options    Care Plan review completed: No    Medication Review:  No changes to current psychiatric medication(s)    Medication Compliance:  Yes    Changes in Health Issues:   None reported    Chemical Use Review:   Substance Use: Chemical use reviewed, no active concerns identified      Tobacco Use: Yes, stable use.  Patient reports frequency of use 3-5 cigarettes daily. Contemplation  Reviewed options for assistance and intervention  Provided encouragement to quit   Provided support and affirmation for steps taken towards quiting   Discussed treatment options and encouraged patient to schedule an appointment with PCP    Assessment: Current Emotional / Mental Status (status of significant symptoms):  Risk status (Self / Other harm or suicidal ideation)  Patient has had a history of suicidal ideation: passive thoughts and denies a history of suicide attempts, self-injurious behavior, homicidal ideation, homicidal behavior and and other safety concerns  Patient denies  current fears or concerns for personal safety.  Patient denies current or recent suicidal ideation or behaviors.  Patient denies current or recent homicidal ideation or behaviors.  Patient denies current or recent self injurious behavior or ideation.  Patient denies other safety concerns.  A safety and risk management plan has not been developed at this time, however patient was encouraged to call [place] [phone] should there be a change in any of these risk factors.     [place] Suicide Severity Rating Scale (Short Version)  [place] Suicide Severity Rating (Short Version) 10/6/2020 12/9/2020 1/11/2021 2/9/2021 6/28/2021   Over the past 2 weeks have you felt down, depressed, or hopeless? no yes no no yes   Over the past 2 weeks have you had thoughts of killing yourself? no yes no no no   Have you ever attempted to kill yourself? no no no no no   Required Interventions - Provider notified - - -     [place] Suicide Severity Rating Scale (Lifetime/Recent)  [place] Suicide Severity Rating (Lifetime/Recent) 7/22/2022   1. Wish to be Dead (Lifetime) 1   1. Wish to be Dead (Past 1 Month) 0   2. Non-Specific Active Suicidal Thoughts (Lifetime) 0   Most Severe Ideation Rating (Lifetime) 2   Frequency (Lifetime) 1   Duration (Lifetime) 1   Controllability (Lifetime) 1   Deterrents (Lifetime) 2   Reasons for Ideation (Lifetime) 4   Actual Attempt (Lifetime) 0   Has subject engaged in non-suicidal self-injurious behavior? (Lifetime) 0   Interrupted Attempts (Lifetime) 0   Aborted or Self-Interrupted Attempt (Lifetime) 0   Preparatory Acts or Behavior (Lifetime) 0   Calculated C-SSRS Risk Score (Lifetime/Recent) No Risk Indicated         Appearance:   Unable to assess   Eye Contact:   Unable to assess   Psychomotor Behavior: Unable to assess   Attitude:   Cooperative   Orientation:   All  Speech   Rate / Production: Normal    Volume:  Normal   Mood:    Normal  Affect:    Appropriate   Thought Content:  Clear   Thought  Form:  Coherent  Logical   Insight:    Fair     Diagnoses:  1. DIMITRI (generalized anxiety disorder)      Per hx and records    Collateral Reports Completed:  Routed note to Care Team Member(s)    Plan: (Homework, other):  Patient was given information about behavioral services and encouraged to schedule a follow up appointment with the clinic C in 2 weeks.  Will complete DA. He was also given information about mental health symptoms and treatment options .  CD Recommendations: Maintain Sobriety.     Kun Pelayo Psy.D, LP   Behavioral Health Clinician   [facility]     July 22, 2022

## 2022-07-21 NOTE — LETTER
2022       RE: Dax Villareal  [address]  [address] [place] [address]     Dear Colleague,    Thank you for referring your patient, Dax Villareal, to the [facility] MENTAL HEALTH & ADDICTION SERVICES at [facility]. Please see a copy of my visit note below.    [facility]: Integrated Behavioral Health  2022      Behavioral Health Clinician Progress Note    Patient Name: Dax Villareal           Service Type: Individual      Service Location:  Video Visit      Session Start Time: 12:30  Session End Time: 12:59      Session Length: 16 - 37      Attendees: Patient    Visit Activities (Refresh list every visit): [facility] Department Only    Service Modality:  Video Visit:      Provider verified identity through the following two step process.  Patient provided:  Patient  and Patient is known previously to provider    Telemedicine Visit: The patient's condition can be safely assessed and treated via synchronous audio and visual telemedicine encounter.      Reason for Telemedicine Visit: Services only offered telehealth    Originating Site (Patient Location): Patient's home    Distant Site (Provider Location): Provider Remote Setting- Home Office    Consent:  The patient/guardian has verbally consented to: the potential risks and benefits of telemedicine (video visit) versus in person care; bill my insurance or make self-payment for services provided; and responsibility for payment of non-covered services.     Patient would like the video invitation sent by:  My Chart    Mode of Communication:  Video Conference via well    As the provider I attest to compliance with applicable laws and regulations related to telemedicine.      Diagnostic Assessment Date: IP  Treatment Plan Review Date: IP  See Flowsheets for today's PHQ-9 and DIMITRI-7 results  Previous PHQ-9: No flowsheet data found.  Previous DIMITRI-7:  No flowsheet data found.    DATA  Extended Session (60+ minutes): No  Interactive Complexity: No  Crisis: No    Treatment Objective(s) Addressed in This Session:  Target Behavior(s): disease management/lifestyle changes      Anxiety: will experience a reduction in anxiety and will develop more effective coping skills to manage anxiety symptoms  Alcohol / Substance Use: will increase understanding of the effects of substance use  will make healthier choices in regard to substance use  Psychological distress related to Chronic Disease Management    Current Stressors / Issues:  [facility] met with Dax today to continue gathering background information, establish rapport, and provide supportive psychotherapy as he proceeds with SOT. Dax states he is feeling more optimistic today because he plans to move out of the nursing home into his own apartment next week. He discussed why he is looking forward to the transition, including having more space of his own and he suspects he will sleep better, see improvements to other health behaviors. Discussed tobacco use in particular. 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 Dax voiced understanding of his need to quit smoking for SOT and we discussed this under an MI framework. Dax notes that after he moves, he believes he will be more ready and confident to quit. Reviewed periods of sobriety from tobacco in the past, if there were any strengths/resources he could draw upon again this time. We identified a few barriers to quitting for him, including how he enjoys the social component of smoking. He suspects this will change at his apartment, as it does not permit smoking on the premises.     Additionally discussed role of anxiety for him, gathered more information about his difficulties with this. Notes nervousness, restlessness, some occasional worry. Notes tobacco use helps him relax though he  identified that quitting might help him reduce stress in the long-term. He voiced interest in returning to walking and exercise in the future, which we agreed would assist him in managing anxiety more effectively.       Progress on Treatment Objective(s) / Homework:  Minimal progress - CONTEMPLATION (Considering change and yet undecided); Intervened by assessing the negative and positive thinking (ambivalence) about behavior change    Motivational Interviewing    MI Intervention: Expressed Empathy/Understanding, Supported Autonomy, Collaboration, Evocation, Permission to raise concern or advise, Open-ended questions, Reflections: simple and complex, Rolled with resistance: Emphasized patient autonomy, Simple reflection, Complex reflection, Shifted topic to defuse resistance, Reframed sustain talk in the direction of change and Evoked patient agenda, Change talk (evoked) and Reframe     Change Talk Expressed by the Patient: NA - Precontemplative    Provider Response to Change Talk: E - Evoked more info from patient about behavior change, A - Affirmed patient's thoughts, decisions, or attempts at behavior change, R - Reflected patient's change talk and S - Summarized patient's change talk statements    Also provided psychoeducation about behavioral health condition, symptoms, and treatment options    Care Plan review completed: No    Medication Review:  No changes to current psychiatric medication(s)    Medication Compliance:  Yes    Changes in Health Issues:   None reported    Chemical Use Review:   Substance Use: Chemical use reviewed, no active concerns identified      Tobacco Use: Yes, stable use.  Patient reports frequency of use 3-5 cigarettes daily. Contemplation  Reviewed options for assistance and intervention  Provided encouragement to quit   Provided support and affirmation for steps taken towards quiting   Discussed treatment options and encouraged patient to schedule an appointment with  PCP    Assessment: Current Emotional / Mental Status (status of significant symptoms):  Risk status (Self / Other harm or suicidal ideation)  Patient has had a history of suicidal ideation: passive thoughts and denies a history of suicide attempts, self-injurious behavior, homicidal ideation, homicidal behavior and and other safety concerns  Patient denies current fears or concerns for personal safety.  Patient denies current or recent suicidal ideation or behaviors.  Patient denies current or recent homicidal ideation or behaviors.  Patient denies current or recent self injurious behavior or ideation.  Patient denies other safety concerns.  A safety and risk management plan has not been developed at this time, however patient was encouraged to call [place] [phone] should there be a change in any of these risk factors.     Bailey Suicide Severity Rating Scale (Short Version)  Bailey Suicide Severity Rating (Short Version) 10/6/2020 12/9/2020 1/11/2021 2/9/2021 6/28/2021   Over the past 2 weeks have you felt down, depressed, or hopeless? no yes no no yes   Over the past 2 weeks have you had thoughts of killing yourself? no yes no no no   Have you ever attempted to kill yourself? no no no no no   Required Interventions - Provider notified - - -     Bailey Suicide Severity Rating Scale (Lifetime/Recent)  Bailey Suicide Severity Rating (Lifetime/Recent) 7/22/2022   1. Wish to be Dead (Lifetime) 1   1. Wish to be Dead (Past 1 Month) 0   2. Non-Specific Active Suicidal Thoughts (Lifetime) 0   Most Severe Ideation Rating (Lifetime) 2   Frequency (Lifetime) 1   Duration (Lifetime) 1   Controllability (Lifetime) 1   Deterrents (Lifetime) 2   Reasons for Ideation (Lifetime) 4   Actual Attempt (Lifetime) 0   Has subject engaged in non-suicidal self-injurious behavior? (Lifetime) 0   Interrupted Attempts (Lifetime) 0   Aborted or Self-Interrupted Attempt (Lifetime) 0   Preparatory Acts or Behavior (Lifetime) 0    Calculated C-SSRS Risk Score (Lifetime/Recent) No Risk Indicated         Appearance:   Unable to assess   Eye Contact:   Unable to assess   Psychomotor Behavior: Unable to assess   Attitude:   Cooperative   Orientation:   All  Speech   Rate / Production: Normal    Volume:  Normal   Mood:    Normal  Affect:    Appropriate   Thought Content:  Clear   Thought Form:  Coherent  Logical   Insight:    Fair     Diagnoses:  1. DIMITRI (generalized anxiety disorder)      Per hx and records    Collateral Reports Completed:  Routed note to Care Team Member(s)    Plan: (Homework, other):  Patient was given information about behavioral services and encouraged to schedule a follow up appointment with the clinic [facility] in 2 weeks.  Will complete DA. He was also given information about mental health symptoms and treatment options .  CD Recommendations: Maintain Sobriety.       Kun Pelayo Psy.D, LP   Behavioral Health Clinician   [facility]     July 22, 2022

## 2022-07-22 ASSESSMENT — COLUMBIA-SUICIDE SEVERITY RATING SCALE - C-SSRS
TOTAL  NUMBER OF ABORTED OR SELF INTERRUPTED ATTEMPTS LIFETIME: NO
REASONS FOR IDEATION LIFETIME: MOSTLY TO END OR STOP THE PAIN (YOU COULDN'T GO ON LIVING WITH THE PAIN OR HOW YOU WERE FEELING)
1. IN THE PAST MONTH, HAVE YOU WISHED YOU WERE DEAD OR WISHED YOU COULD GO TO SLEEP AND NOT WAKE UP?: NO
6. HAVE YOU EVER DONE ANYTHING, STARTED TO DO ANYTHING, OR PREPARED TO DO ANYTHING TO END YOUR LIFE?: NO
ATTEMPT LIFETIME: NO
1. HAVE YOU WISHED YOU WERE DEAD OR WISHED YOU COULD GO TO SLEEP AND NOT WAKE UP?: YES
TOTAL  NUMBER OF INTERRUPTED ATTEMPTS LIFETIME: NO
2. HAVE YOU ACTUALLY HAD ANY THOUGHTS OF KILLING YOURSELF?: NO

## 2022-08-01 NOTE — TELEPHONE ENCOUNTER
Action 8/1 [facility]  Fax #[phone]   Action Taken Requested echo 2-5-22, 11-1-21 8/2 Received both echos via disc, to be uploaded to PACS     Action 8/1 Records Internal   Action Taken EKGs from [facility] viewable in Epic

## 2022-08-03 ENCOUNTER — TELEPHONE (OUTPATIENT)
Dept: FAMILY MEDICINE | Facility: CLINIC | Age: 32
End: 2022-08-03

## 2022-08-03 ENCOUNTER — VIRTUAL VISIT (OUTPATIENT)
Dept: FAMILY MEDICINE | Facility: CLINIC | Age: 32
End: 2022-08-03
Payer: COMMERCIAL

## 2022-08-03 DIAGNOSIS — F41.1 GAD (GENERALIZED ANXIETY DISORDER): ICD-10-CM

## 2022-08-03 DIAGNOSIS — G89.4 CHRONIC PAIN SYNDROME: ICD-10-CM

## 2022-08-03 DIAGNOSIS — G25.81 RESTLESS LEGS SYNDROME: ICD-10-CM

## 2022-08-03 DIAGNOSIS — K85.91 NECROTIZING PANCREATITIS: ICD-10-CM

## 2022-08-03 DIAGNOSIS — N17.0 ACUTE KIDNEY FAILURE WITH TUBULAR NECROSIS (H): ICD-10-CM

## 2022-08-03 DIAGNOSIS — K21.00 GASTROESOPHAGEAL REFLUX DISEASE WITH ESOPHAGITIS WITHOUT HEMORRHAGE: ICD-10-CM

## 2022-08-03 DIAGNOSIS — Z79.899 ENCOUNTER FOR LONG-TERM (CURRENT) USE OF MEDICATIONS: Primary | ICD-10-CM

## 2022-08-03 PROCEDURE — 99214 OFFICE O/P EST MOD 30 MIN: CPT | Mod: 95 | Performed by: FAMILY MEDICINE

## 2022-08-03 RX ORDER — FOLIC ACID 1 MG/1
1 TABLET ORAL DAILY
Qty: 90 TABLET | Refills: 3 | Status: SHIPPED | OUTPATIENT
Start: 2022-08-03 | End: 2024-05-21

## 2022-08-03 RX ORDER — MIDODRINE HYDROCHLORIDE 5 MG/1
10 TABLET ORAL 3 TIMES DAILY
Qty: 270 TABLET | Refills: 3 | Status: SHIPPED | OUTPATIENT
Start: 2022-08-03 | End: 2023-06-05

## 2022-08-03 RX ORDER — HYDROXYZINE HYDROCHLORIDE 25 MG/1
25 TABLET, FILM COATED ORAL 3 TIMES DAILY PRN
Qty: 90 TABLET | Refills: 3 | Status: SHIPPED | OUTPATIENT
Start: 2022-08-03 | End: 2023-04-17 | Stop reason: ALTCHOICE

## 2022-08-03 RX ORDER — DULOXETIN HYDROCHLORIDE 60 MG/1
60 CAPSULE, DELAYED RELEASE ORAL DAILY
Qty: 90 CAPSULE | Refills: 3 | Status: SHIPPED | OUTPATIENT
Start: 2022-08-03 | End: 2024-03-21

## 2022-08-03 RX ORDER — ROPINIROLE 0.25 MG/1
0.25 TABLET, FILM COATED ORAL AT BEDTIME
Qty: 90 TABLET | Refills: 3 | Status: SHIPPED | OUTPATIENT
Start: 2022-08-03 | End: 2023-01-23

## 2022-08-03 RX ORDER — PANTOPRAZOLE SODIUM 20 MG/1
40 TABLET, DELAYED RELEASE ORAL DAILY
Qty: 90 TABLET | Refills: 3 | Status: SHIPPED | OUTPATIENT
Start: 2022-08-03 | End: 2022-10-11 | Stop reason: DRUGHIGH

## 2022-08-03 RX ORDER — PROCHLORPERAZINE MALEATE 5 MG
5 TABLET ORAL EVERY 8 HOURS PRN
Qty: 90 TABLET | Refills: 3 | Status: SHIPPED | OUTPATIENT
Start: 2022-08-03 | End: 2023-01-23

## 2022-08-03 NOTE — TELEPHONE ENCOUNTER
Pt called stated he is in the waiting room ready for virtual visit. Pt will wait until provider or staff connects or calls for additional info.

## 2022-08-03 NOTE — TELEPHONE ENCOUNTER
Patient had office visit and switched it to Virtual visit instead. He was not answering any of my calls for check in, but he connected on video. Provider notified of this and will see if he can connect with patient in the waiting room, if not able to connect, patient will have to reschedule for another day/time    Edna Jeffers MA

## 2022-08-03 NOTE — PROGRESS NOTES
Dax is a 31 year old who is being evaluated via a billable video visit.      How would you like to obtain your AVS? MyChart  If the video visit is dropped, the invitation should be resent by: Text to cell phone: [phone]  Will anyone else be joining your video visit? No        Assessment & Plan     Encounter for long-term (current) use of medications  Patient has moved out from the Nursing Home.  Now he is living in his own place.  He does need to have his prescription renewed.    He is in the process of establishing care with a pain clinic to continue with his pain medication.  End-stage kidney disease, on hemodialysis 3 times weekly.  He is on the waiting list for kidney transplant.    Necrotizing pancreatitis    - prochlorperazine (COMPAZINE) 5 MG tablet; Take 1 tablet (5 mg) by mouth every 8 hours as needed for nausea or vomiting  - amylase-lipase-protease (CREON 24) 49261-65390 units CPEP per EC capsule; 1-2 capsules by Per Feeding Tube route every 4 hours    Acute kidney failure with tubular necrosis (H)    - midodrine (PROAMATINE) 5 MG tablet; 2 tablets (10 mg) by Per Feeding Tube route 3 times daily  - torsemide 40 MG TABS; Take 40 mg by mouth daily    DIMITRI (generalized anxiety disorder)  Increased Duloextine to 60 mg,   Added hydroxyzine.  - DULoxetine (CYMBALTA) 60 MG capsule; Take 1 capsule (60 mg) by mouth daily  - hydrOXYzine (ATARAX) 25 MG tablet; Take 1 tablet (25 mg) by mouth 3 times daily as needed for itching    Restless legs syndrome    - rOPINIRole (REQUIP) 0.25 MG tablet; Take 1 tablet (0.25 mg) by mouth At Bedtime    Gastroesophageal reflux disease with esophagitis without hemorrhage    - pantoprazole (PROTONIX) 20 MG EC tablet; Take 2 tablets (40 mg) by mouth daily    Chronic pain syndrome  To establish with Pain clinic.  Increased his Duloxetine,  May consider Lyrica.    History of ADHD, he is on Adderall.  His next refill is not due until August , 19/2022.         Work on weight  loss  Regular exercise    No follow-ups on file.    Kole Gomez MD  [facility]    David Verduzco is a 31 year old, presenting for the following health issues:  RECHECK      HPI         Review of Systems   Constitutional, HEENT, cardiovascular, pulmonary, gi and gu systems are negative, except as otherwise noted.      Objective           Vitals:  No vitals were obtained today due to virtual visit.    Physical Exam   GENERAL: Healthy, alert and no distress  EYES: Eyes grossly normal to inspection.  No discharge or erythema, or obvious scleral/conjunctival abnormalities.  RESP: No audible wheeze, cough, or visible cyanosis.  No visible retractions or increased work of breathing.    SKIN: Visible skin clear. No significant rash, abnormal pigmentation or lesions.  NEURO: Cranial nerves grossly intact.  Mentation and speech appropriate for age.  PSYCH: Mentation appears normal, affect normal/bright, judgement and insight intact, normal speech and appearance well-groomed.        Kole Gomez MD        Video-Visit Details    Video Start Time: 9:17 am    Type of service:  Video Visit    Video End Time:9:33 AM    Originating Location (pt. Location): Home    Distant Location (provider location):  [facility]     Platform used for Video Visit: [employer]    .  ..

## 2022-08-04 ENCOUNTER — VIRTUAL VISIT (OUTPATIENT)
Dept: BEHAVIORAL HEALTH | Facility: CLINIC | Age: 32
End: 2022-08-04
Payer: COMMERCIAL

## 2022-08-04 ENCOUNTER — TELEPHONE (OUTPATIENT)
Dept: FAMILY MEDICINE | Facility: CLINIC | Age: 32
End: 2022-08-04

## 2022-08-04 DIAGNOSIS — F41.1 GAD (GENERALIZED ANXIETY DISORDER): Primary | ICD-10-CM

## 2022-08-04 DIAGNOSIS — F90.9 ATTENTION DEFICIT HYPERACTIVITY DISORDER (ADHD), UNSPECIFIED ADHD TYPE: ICD-10-CM

## 2022-08-04 DIAGNOSIS — G89.4 CHRONIC PAIN SYNDROME: Primary | ICD-10-CM

## 2022-08-04 DIAGNOSIS — M24.576 CONTRACTURE OF JOINT OF FOOT, UNSPECIFIED LATERALITY: ICD-10-CM

## 2022-08-04 DIAGNOSIS — G62.9 PERIPHERAL POLYNEUROPATHY: ICD-10-CM

## 2022-08-04 PROCEDURE — 90834 PSYTX W PT 45 MINUTES: CPT | Mod: 95 | Performed by: PSYCHOLOGIST

## 2022-08-04 RX ORDER — DEXTROAMPHETAMINE SACCHARATE, AMPHETAMINE ASPARTATE MONOHYDRATE, DEXTROAMPHETAMINE SULFATE AND AMPHETAMINE SULFATE 6.25; 6.25; 6.25; 6.25 MG/1; MG/1; MG/1; MG/1
25 CAPSULE, EXTENDED RELEASE ORAL 2 TIMES DAILY
Qty: 60 CAPSULE | Refills: 0 | Status: SHIPPED | OUTPATIENT
Start: 2022-08-04 | End: 2022-08-06

## 2022-08-04 RX ORDER — TIZANIDINE 2 MG/1
2 TABLET ORAL 3 TIMES DAILY
Qty: 90 TABLET | Refills: 11 | Status: SHIPPED | OUTPATIENT
Start: 2022-08-04 | End: 2023-10-11

## 2022-08-04 NOTE — PROGRESS NOTES
"[facility]: Integrated Behavioral Health  2022      Behavioral Health Clinician Progress Note    Patient Name: Dax Villareal           Service Type: Phone Visit      Service Location:  Phone Visit      Session Start Time: 12:32  Session End Time: 1:16      Session Length: 16 - 37      Attendees: Patient    Visit Activities (Refresh list every visit): [facility] Only and Phone Encounter    Service Modality:  Phone Visit:      Provider verified identity through the following two step process.  Patient provided:  Patient  and Patient is known previously to provider    The patient has been notified of the following:      \"We have found that certain health care needs can be provided without the need for a face to face visit.  This service lets us provide the care you need with a phone conversation.       I will have full access to your [facility] medical record during this entire phone call.   I will be taking notes for your medical record.      Since this is like an office visit, we will bill your insurance company for this service.       There are potential benefits and risks of telephone visits (e.g. limits to patient confidentiality) that differ from in-person visits.?Confidentiality still applies for telephone services, and nobody will record the visit.  It is important to be in a quiet, private space that is free of distractions (including cell phone or other devices) during the visit.??      If during the course of the call I believe a telephone visit is not appropriate, you will not be charged for this service\"     Consent has been obtained for this service by care team member: Yes         Diagnostic Assessment Date: IP  Treatment Plan Review Date: IP  See Flowsheets for today's PHQ-9 and DIMITRI-7 results  Previous PHQ-9:   PHQ-9 SCORE 2022   PHQ-9 Total Score MyChart 10 (Moderate depression)   PHQ-9 Total Score 10     Previous DIMITRI-7:   DIMITRI-7 SCORE " 7/21/2022   Total Score 7 (mild anxiety)   Total Score 7       DATA  Extended Session (60+ minutes): No  Interactive Complexity: No  Crisis: No    Treatment Objective(s) Addressed in This Session:  Target Behavior(s): disease management/lifestyle changes      Anxiety: will experience a reduction in anxiety and will develop more effective coping skills to manage anxiety symptoms  Alcohol / Substance Use: will increase understanding of the effects of substance use  will make healthier choices in regard to substance use  Psychological distress related to Chronic Disease Management    Current Stressors / Issues:  [facility] met with Dax today to continue gathering background information, establish rapport, and provide supportive psychotherapy as he proceeds with SOT. He called from his dialysis center, as they moved his appointment around and he could only talk on the phone today. Reports feeling positive about transitioning out of the nursing home into his own apartment today. Session mostly consisted of supportive therapy, reviewing a few recent stressors he has faced. He reports stress from his getting his possessions from the nursing home and storage to his new apartment and notes some concerns regarding his medications. Discussed my role with therapy, not doing anything regarding making recommendations to his medication regimen. Encouraged him to follow-up with his medical care team and prescribing provider to review medication concerns.       Progress on Treatment Objective(s) / Homework:  Minimal progress - CONTEMPLATION (Considering change and yet undecided); Intervened by assessing the negative and positive thinking (ambivalence) about behavior change    Motivational Interviewing    MI Intervention: Expressed Empathy/Understanding, Supported Autonomy, Collaboration, Evocation, Permission to raise concern or advise, Open-ended questions, Reflections: simple and complex, Rolled with resistance: Emphasized patient  autonomy, Simple reflection, Complex reflection, Shifted topic to defuse resistance, Reframed sustain talk in the direction of change and Evoked patient agenda, Change talk (evoked) and Reframe     Change Talk Expressed by the Patient: NA - Precontemplative    Provider Response to Change Talk: E - Evoked more info from patient about behavior change, A - Affirmed patient's thoughts, decisions, or attempts at behavior change, R - Reflected patient's change talk and S - Summarized patient's change talk statements    Also provided psychoeducation about behavioral health condition, symptoms, and treatment options    Care Plan review completed: No    Medication Review:  No changes to current psychiatric medication(s)    Medication Compliance:  Yes    Changes in Health Issues:   None reported    Chemical Use Review:   Substance Use: Chemical use reviewed, no active concerns identified      Tobacco Use: Yes, stable use.  Patient reports frequency of use 3-5 cigarettes daily. Contemplation  Reviewed options for assistance and intervention  Provided encouragement to quit   Provided support and affirmation for steps taken towards quiting   Discussed treatment options and encouraged patient to schedule an appointment with PCP    Assessment: Current Emotional / Mental Status (status of significant symptoms):  Risk status (Self / Other harm or suicidal ideation)  Patient has had a history of suicidal ideation: passive thoughts and denies a history of suicide attempts, self-injurious behavior, homicidal ideation, homicidal behavior and and other safety concerns  Patient denies current fears or concerns for personal safety.  Patient denies current or recent suicidal ideation or behaviors.  Patient denies current or recent homicidal ideation or behaviors.  Patient denies current or recent self injurious behavior or ideation.  Patient denies other safety concerns.  A safety and risk management plan has not been developed at this  time, however patient was encouraged to call [facility] should there be a change in any of these risk factors.     [place] Suicide Severity Rating Scale (Short Version)  [place] Suicide Severity Rating (Short Version) 10/6/2020 12/9/2020 1/11/2021 2/9/2021 6/28/2021   Over the past 2 weeks have you felt down, depressed, or hopeless? no yes no no yes   Over the past 2 weeks have you had thoughts of killing yourself? no yes no no no   Have you ever attempted to kill yourself? no no no no no   Required Interventions - Provider notified - - -     [place] Suicide Severity Rating Scale (Lifetime/Recent)  [place] Suicide Severity Rating (Lifetime/Recent) 7/22/2022   1. Wish to be Dead (Lifetime) 1   1. Wish to be Dead (Past 1 Month) 0   2. Non-Specific Active Suicidal Thoughts (Lifetime) 0   Most Severe Ideation Rating (Lifetime) 2   Frequency (Lifetime) 1   Duration (Lifetime) 1   Controllability (Lifetime) 1   Deterrents (Lifetime) 2   Reasons for Ideation (Lifetime) 4   Actual Attempt (Lifetime) 0   Has subject engaged in non-suicidal self-injurious behavior? (Lifetime) 0   Interrupted Attempts (Lifetime) 0   Aborted or Self-Interrupted Attempt (Lifetime) 0   Preparatory Acts or Behavior (Lifetime) 0   Calculated C-SSRS Risk Score (Lifetime/Recent) No Risk Indicated         Appearance:   Unable to assess   Eye Contact:   Unable to assess   Psychomotor Behavior: Unable to assess   Attitude:   Cooperative   Orientation:   All  Speech   Rate / Production: Normal    Volume:  Normal   Mood:    Normal  Affect:    Appropriate   Thought Content:  Clear   Thought Form:  Coherent  Logical   Insight:    Fair     Diagnoses:  1. DIMITRI (generalized anxiety disorder)      Per hx and records    Collateral Reports Completed:  Routed note to Care Team Member(s)    Plan: (Homework, other):  Patient was given information about behavioral services and encouraged to schedule a follow up appointment with the clinic [facility] in 2 weeks.   Will complete DA - unable to complete today due to shorter visit. He was also given information about mental health symptoms and treatment options .  CD Recommendations: Maintain Sobriety.     Kun Pelayo Psy.D, LP   Behavioral Health Clinician   -[facility]     August 5, 2022

## 2022-08-04 NOTE — TELEPHONE ENCOUNTER
Routing to PCP    Care Coordination referral to assist in Navigating health system?  Help connect with transplant care coordination?    Pended        RN received call back from patient.    Patient asking about Adderall.  RN advised that refill had been sent in today at 11 am.    Patient then asking for multiple medications be removed from his medication list. RN advised that Dr. Gomez had not prescribed the medications he was questioning.   RN advised to reach out to his nephrology team as well as transplant team to determine if he should continue taking medications he was taking.    Patient then asking about  Obtaining a service animal.  RN advised patient to reach out to Behavioral Health as it was related to his Anxiety and PTSD.    RN then gave contact information for Podiatry and Pain management as requested.    Patient then mentioned he and a friend were considering going to [place] and would need to arrange dialysis.  RN advised his dialysis would need to coordinate that as well.    Patient verbalized understanding to all of the above.    Phil Daniel RN, BSN, PHN  [facility]

## 2022-08-04 NOTE — TELEPHONE ENCOUNTER
RN called patient to clarify refill request.     He states that he takes Zanaflex TID and Adderall BID (pended below).     He is under the impression that PCP was to prescribe these at yesterday's visit.     He knows that he needs to follow up with pain clinic, but would like refills until seen.     He also asks whether or not he should be taking gabapentin. It was prescribed at 7/7/22 visit but discontinued from med list.     Routed to PCP to review and advise.     Donna Metz, RN, BSN, PHN  [facility] [place]

## 2022-08-04 NOTE — TELEPHONE ENCOUNTER
Reason for Call:  Medication or medication refill:    Do you use a [facility]?  Name of the pharmacy and phone number for the current request: [facility]/  [facility]   [place] [place] [facility] [place] [facility] [address]    Name of the medication requested:  Could not find this medication on med list.  Tizanidine 2 mg    Other request:     Can we leave a detailed message on this number? YES    Phone number patient can be reached at: Home number on file [phone] (home)    Best Time: any    Call taken on 8/4/2022 at 10:00 AM by Mariely Evans

## 2022-08-04 NOTE — LETTER
"2022       RE: Dax Villareal  [address]  [place] [address]     Dear Colleague,    Thank you for referring your patient, Dax Villareal, to the [facility] MENTAL HEALTH & ADDICTION SERVICES at [facility]. Please see a copy of my visit note below.    [facility] and [facility]: Integrated Behavioral Health  2022      Behavioral Health Clinician Progress Note    Patient Name: Dax Villareal           Service Type: Phone Visit      Service Location:  Phone Visit      Session Start Time: 12:32  Session End Time: 1:16      Session Length: 16 - 37      Attendees: Patient    Visit Activities (Refresh list every visit): BHC Only and Phone Encounter    Service Modality:  Phone Visit:      Provider verified identity through the following two step process.  Patient provided:  Patient  and Patient is known previously to provider    The patient has been notified of the following:      \"We have found that certain health care needs can be provided without the need for a face to face visit.  This service lets us provide the care you need with a phone conversation.       I will have full access to your [facility] medical record during this entire phone call.   I will be taking notes for your medical record.      Since this is like an office visit, we will bill your insurance company for this service.       There are potential benefits and risks of telephone visits (e.g. limits to patient confidentiality) that differ from in-person visits.?Confidentiality still applies for telephone services, and nobody will record the visit.  It is important to be in a quiet, private space that is free of distractions (including cell phone or other devices) during the visit.??      If during the course of the call I believe a telephone visit is not appropriate, you will not be charged for this service\"     Consent " has been obtained for this service by care team member: Yes         Diagnostic Assessment Date: IP  Treatment Plan Review Date: IP  See Flowsheets for today's PHQ-9 and DIMITRI-7 results  Previous PHQ-9:   PHQ-9 SCORE 7/21/2022   PHQ-9 Total Score MyChart 10 (Moderate depression)   PHQ-9 Total Score 10     Previous DIMITRI-7:   DIMITRI-7 SCORE 7/21/2022   Total Score 7 (mild anxiety)   Total Score 7       DATA  Extended Session (60+ minutes): No  Interactive Complexity: No  Crisis: No    Treatment Objective(s) Addressed in This Session:  Target Behavior(s): disease management/lifestyle changes      Anxiety: will experience a reduction in anxiety and will develop more effective coping skills to manage anxiety symptoms  Alcohol / Substance Use: will increase understanding of the effects of substance use  will make healthier choices in regard to substance use  Psychological distress related to Chronic Disease Management    Current Stressors / Issues:  [facility] met with Dax today to continue gathering background information, establish rapport, and provide supportive psychotherapy as he proceeds with SOT. He called from his dialysis center, as they moved his appointment around and he could only talk on the phone today. Reports feeling positive about transitioning out of the nursing home into his own apartment today. Session mostly consisted of supportive therapy, reviewing a few recent stressors he has faced. He reports stress from his getting his possessions from the nursing home and storage to his new apartment and notes some concerns regarding his medications. Discussed my role with therapy, not doing anything regarding making recommendations to his medication regimen. Encouraged him to follow-up with his medical care team and prescribing provider to review medication concerns.       Progress on Treatment Objective(s) / Homework:  Minimal progress - CONTEMPLATION (Considering change and yet undecided); Intervened by assessing the  negative and positive thinking (ambivalence) about behavior change    Motivational Interviewing    MI Intervention: Expressed Empathy/Understanding, Supported Autonomy, Collaboration, Evocation, Permission to raise concern or advise, Open-ended questions, Reflections: simple and complex, Rolled with resistance: Emphasized patient autonomy, Simple reflection, Complex reflection, Shifted topic to defuse resistance, Reframed sustain talk in the direction of change and Evoked patient agenda, Change talk (evoked) and Reframe     Change Talk Expressed by the Patient: NA - Precontemplative    Provider Response to Change Talk: E - Evoked more info from patient about behavior change, A - Affirmed patient's thoughts, decisions, or attempts at behavior change, R - Reflected patient's change talk and S - Summarized patient's change talk statements    Also provided psychoeducation about behavioral health condition, symptoms, and treatment options    Care Plan review completed: No    Medication Review:  No changes to current psychiatric medication(s)    Medication Compliance:  Yes    Changes in Health Issues:   None reported    Chemical Use Review:   Substance Use: Chemical use reviewed, no active concerns identified      Tobacco Use: Yes, stable use.  Patient reports frequency of use 3-5 cigarettes daily. Contemplation  Reviewed options for assistance and intervention  Provided encouragement to quit   Provided support and affirmation for steps taken towards quiting   Discussed treatment options and encouraged patient to schedule an appointment with PCP    Assessment: Current Emotional / Mental Status (status of significant symptoms):  Risk status (Self / Other harm or suicidal ideation)  Patient has had a history of suicidal ideation: passive thoughts and denies a history of suicide attempts, self-injurious behavior, homicidal ideation, homicidal behavior and and other safety concerns  Patient denies current fears or concerns  for personal safety.  Patient denies current or recent suicidal ideation or behaviors.  Patient denies current or recent homicidal ideation or behaviors.  Patient denies current or recent self injurious behavior or ideation.  Patient denies other safety concerns.  A safety and risk management plan has not been developed at this time, however patient was encouraged to call [place] [phone] should there be a change in any of these risk factors.     Krypton Suicide Severity Rating Scale (Short Version)  Krypton Suicide Severity Rating (Short Version) 10/6/2020 12/9/2020 1/11/2021 2/9/2021 6/28/2021   Over the past 2 weeks have you felt down, depressed, or hopeless? no yes no no yes   Over the past 2 weeks have you had thoughts of killing yourself? no yes no no no   Have you ever attempted to kill yourself? no no no no no   Required Interventions - Provider notified - - -     Krypton Suicide Severity Rating Scale (Lifetime/Recent)  Krypton Suicide Severity Rating (Lifetime/Recent) 7/22/2022   1. Wish to be Dead (Lifetime) 1   1. Wish to be Dead (Past 1 Month) 0   2. Non-Specific Active Suicidal Thoughts (Lifetime) 0   Most Severe Ideation Rating (Lifetime) 2   Frequency (Lifetime) 1   Duration (Lifetime) 1   Controllability (Lifetime) 1   Deterrents (Lifetime) 2   Reasons for Ideation (Lifetime) 4   Actual Attempt (Lifetime) 0   Has subject engaged in non-suicidal self-injurious behavior? (Lifetime) 0   Interrupted Attempts (Lifetime) 0   Aborted or Self-Interrupted Attempt (Lifetime) 0   Preparatory Acts or Behavior (Lifetime) 0   Calculated C-SSRS Risk Score (Lifetime/Recent) No Risk Indicated         Appearance:   Unable to assess   Eye Contact:   Unable to assess   Psychomotor Behavior: Unable to assess   Attitude:   Cooperative   Orientation:   All  Speech   Rate / Production: Normal    Volume:  Normal   Mood:    Normal  Affect:    Appropriate   Thought Content:  Clear   Thought Form:  Coherent  Logical    Insight:    Fair     Diagnoses:  1. DIMITRI (generalized anxiety disorder)      Per hx and records    Collateral Reports Completed:  Routed note to Care Team Member(s)    Plan: (Homework, other):  Patient was given information about behavioral services and encouraged to schedule a follow up appointment with the clinic [facility] in 2 weeks.  Will complete DA - unable to complete today due to shorter visit. He was also given information about mental health symptoms and treatment options .  CD Recommendations: Maintain Sobriety.       August 5, 2022        Again, thank you for allowing me to participate in the care of your patient.      Sincerely,    Kun Pelayo

## 2022-08-04 NOTE — TELEPHONE ENCOUNTER
RN relayed provider's message below.     He also request a referral for podiatry (pended).     He also asks who he should see for pain management?     He is at dialysis currently, will call back to discuss at a later time.    Routing to PCP to consider podiatry referral  .Contracture of joint of foot, unspecified laterality  - [facility] Prosthetics DME Orthotic; Foot Orthotics  - gabapentin (NEURONTIN) 300 MG capsule; One tab at bedtime for 3 days, then one bid      Donna Metz RN, BSN, PHN  [facility]: [place]     suicidal thoughts

## 2022-08-05 DIAGNOSIS — F90.9 ATTENTION DEFICIT HYPERACTIVITY DISORDER (ADHD), UNSPECIFIED ADHD TYPE: ICD-10-CM

## 2022-08-05 NOTE — TELEPHONE ENCOUNTER
Routing to Dr. Gomez.  Patient has to transfer all of his RXs from [facility] to [facility].  However, he wasn't able to transfer Adderall XR, which was sent on 8/4.      He is planning to pay cash for two pills for over-the-weekend.  But then he is hoping you will send in a new RX to [facility].  RX pended if agreeable.    Kennedy Russell RN

## 2022-08-06 RX ORDER — DEXTROAMPHETAMINE SACCHARATE, AMPHETAMINE ASPARTATE MONOHYDRATE, DEXTROAMPHETAMINE SULFATE AND AMPHETAMINE SULFATE 6.25; 6.25; 6.25; 6.25 MG/1; MG/1; MG/1; MG/1
25 CAPSULE, EXTENDED RELEASE ORAL 2 TIMES DAILY
Qty: 60 CAPSULE | Refills: 0 | Status: SHIPPED | OUTPATIENT
Start: 2022-08-06 | End: 2022-08-11

## 2022-08-08 ENCOUNTER — PRE VISIT (OUTPATIENT)
Dept: CARDIOLOGY | Facility: CLINIC | Age: 32
End: 2022-08-08

## 2022-08-09 ENCOUNTER — TELEPHONE (OUTPATIENT)
Dept: FAMILY MEDICINE | Facility: CLINIC | Age: 32
End: 2022-08-09

## 2022-08-09 DIAGNOSIS — F90.9 ATTENTION DEFICIT HYPERACTIVITY DISORDER (ADHD), UNSPECIFIED ADHD TYPE: ICD-10-CM

## 2022-08-09 DIAGNOSIS — N17.0 ACUTE KIDNEY FAILURE WITH TUBULAR NECROSIS (H): Primary | ICD-10-CM

## 2022-08-09 NOTE — TELEPHONE ENCOUNTER
Pt hung up when call was being transferred.      Attempted to call patient back- left message for him to return call to clinic  Unsure what patient was calling for.      Brianna Bradley RN
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
## 2022-08-10 ENCOUNTER — TELEPHONE (OUTPATIENT)
Dept: FAMILY MEDICINE | Facility: CLINIC | Age: 32
End: 2022-08-10

## 2022-08-10 DIAGNOSIS — N17.0 ACUTE KIDNEY FAILURE WITH TUBULAR NECROSIS (H): ICD-10-CM

## 2022-08-10 DIAGNOSIS — G62.9 PERIPHERAL POLYNEUROPATHY: ICD-10-CM

## 2022-08-10 DIAGNOSIS — M24.576 CONTRACTURE OF JOINT OF FOOT, UNSPECIFIED LATERALITY: Primary | ICD-10-CM

## 2022-08-10 NOTE — TELEPHONE ENCOUNTER
Central Prior Authorization Team   Phone: [phone]    PA Initiation    Medication: torsemide 40 MG TABS  Insurance Company: [employer] - Phone [phone] Fax [phone]  Pharmacy Filling the Rx: [id number] - [address] AT [place] [address]  Filling Pharmacy Phone: [phone]  Filling Pharmacy Fax:    Start Date: 8/10/2022

## 2022-08-10 NOTE — TELEPHONE ENCOUNTER
Pt is returning call , would like to use either:    [facility] in [facility]  [facility]  [facility]  [facility] in [place]  Rel[facility] medical supply  [facility]     Call  if questions    Lorena Yu-  Efrem Krishna

## 2022-08-10 NOTE — TELEPHONE ENCOUNTER
Prior Authorization Retail Medication Request    Medication/Dose: torsemide 40 MG TABS    ICD code (if different than what is on RX):  na  Previously Tried and Failed:  na  Rationale:  na    Insurance Name:  na  Insurance ID:  key: [id number]      Pharmacy Information (if different than what is on RX)  Name:  [facility]  Phone:  [facility]    to submit the PA, please follow the instructions below:    [email or web address]  KEY: [id number]

## 2022-08-11 ENCOUNTER — TELEPHONE (OUTPATIENT)
Dept: FAMILY MEDICINE | Facility: CLINIC | Age: 32
End: 2022-08-11

## 2022-08-11 DIAGNOSIS — F90.9 ATTENTION DEFICIT HYPERACTIVITY DISORDER (ADHD), UNSPECIFIED ADHD TYPE: ICD-10-CM

## 2022-08-11 RX ORDER — DEXTROAMPHETAMINE SACCHARATE, AMPHETAMINE ASPARTATE MONOHYDRATE, DEXTROAMPHETAMINE SULFATE AND AMPHETAMINE SULFATE 7.5; 7.5; 7.5; 7.5 MG/1; MG/1; MG/1; MG/1
30 CAPSULE, EXTENDED RELEASE ORAL 2 TIMES DAILY
Qty: 60 CAPSULE | Refills: 0 | Status: SHIPPED | OUTPATIENT
Start: 2022-08-11 | End: 2022-09-08

## 2022-08-11 RX ORDER — DEXTROAMPHETAMINE SACCHARATE, AMPHETAMINE ASPARTATE MONOHYDRATE, DEXTROAMPHETAMINE SULFATE AND AMPHETAMINE SULFATE 6.25; 6.25; 6.25; 6.25 MG/1; MG/1; MG/1; MG/1
25 CAPSULE, EXTENDED RELEASE ORAL EVERY MORNING
Qty: 30 CAPSULE | Refills: 0 | Status: CANCELLED | OUTPATIENT
Start: 2022-08-11

## 2022-08-11 RX ORDER — DEXTROAMPHETAMINE SACCHARATE, AMPHETAMINE ASPARTATE, DEXTROAMPHETAMINE SULFATE AND AMPHETAMINE SULFATE 5; 5; 5; 5 MG/1; MG/1; MG/1; MG/1
TABLET ORAL
Qty: 60 TABLET | Refills: 0 | Status: SHIPPED | OUTPATIENT
Start: 2022-08-11 | End: 2022-09-08

## 2022-08-11 RX ORDER — DEXTROAMPHETAMINE SACCHARATE, AMPHETAMINE ASPARTATE, DEXTROAMPHETAMINE SULFATE AND AMPHETAMINE SULFATE 5; 5; 5; 5 MG/1; MG/1; MG/1; MG/1
20 TABLET ORAL DAILY
Qty: 30 TABLET | Refills: 0 | Status: CANCELLED | OUTPATIENT
Start: 2022-08-11

## 2022-08-11 RX ORDER — DEXTROAMPHETAMINE SACCHARATE, AMPHETAMINE ASPARTATE, DEXTROAMPHETAMINE SULFATE AND AMPHETAMINE SULFATE 2.5; 2.5; 2.5; 2.5 MG/1; MG/1; MG/1; MG/1
10 TABLET ORAL DAILY
Qty: 30 TABLET | Refills: 0 | Status: CANCELLED | OUTPATIENT
Start: 2022-08-11

## 2022-08-11 RX ORDER — DEXTROAMPHETAMINE SACCHARATE, AMPHETAMINE ASPARTATE MONOHYDRATE, DEXTROAMPHETAMINE SULFATE AND AMPHETAMINE SULFATE 6.25; 6.25; 6.25; 6.25 MG/1; MG/1; MG/1; MG/1
25 CAPSULE, EXTENDED RELEASE ORAL 2 TIMES DAILY
Qty: 60 CAPSULE | Refills: 0 | Status: CANCELLED | OUTPATIENT
Start: 2022-08-11

## 2022-08-11 NOTE — TELEPHONE ENCOUNTER
Tapan from [facility] called and said that the insurance will cover torsemide 20mg taking two tablets daily. Pharmacy will need a new script.

## 2022-08-11 NOTE — TELEPHONE ENCOUNTER
New Medication Request    Contacts       Type Contact Phone/Fax    08/11/2022 11:07 AM CDT Phone (Incoming) Dax Villareal (Self) [phone] (H)          What medication are you requesting?: Adderall 25mg x1 a day     Reason for medication request: pt states he takes two different ADHD meds rx.  One twice a day, and one once a day    Have you taken this medication before?: Yes:     Controlled Substance Agreement on file:   CSA -- Patient Level:    CSA: None found at the patient level.         Patient offered an appointment? No    Preferred Pharmacy:   [id number] - [facility], [place] [address] AT [address] [address]  Phone: [phone] Fax: [phone]    Could we send this information to you in [facility] or would you prefer to receive a phone call?:   Patient would prefer a phone call   Okay to leave a detailed message?: Yes at Home number on file [phone] (home)    Lorena Yu-  [place]

## 2022-08-11 NOTE — TELEPHONE ENCOUNTER
I put a referral for Care coordination    Will keep thing simple for his Adderall  Continue with Adderall XR, 30 mg in AM   And Adderall, an immediate  release 20 mg, , he may take 2 tab at noon,.    This is too much of adderall ( nothing more )    thanks

## 2022-08-11 NOTE — TELEPHONE ENCOUNTER
RN left  for patient advising script for adderall had been sent to Pharmacy.    RN advised patient to discuss with pharmacist dosing as it was different than discussed earlier    Phil Daniel RN, BSN, PHN  [facility]

## 2022-08-11 NOTE — TELEPHONE ENCOUNTER
Routing to PCP    Need PA on Adderall 25 mg extended release due to ove the daily limit.    RN spoke with patient and pharmacy.    Willing to send in 3 different scripts to total 50 mg?    Per patient he has taken     Adderall 30 mg XR  In the AM  20 mg at noon  10 mg at 4 pm    Pended if agreeable.    Patient is out of medication and would like ASAP.    Phil Daniel RN, BSN, PHN  [facility]

## 2022-08-11 NOTE — TELEPHONE ENCOUNTER
Pt scheduled with Jason 8/31 to discuss wound care for cath and wheelchair accessories, the battery is not holding a charge- states this was donated to him by a company in [place].     Lorena Yu-  Efrem Krishna

## 2022-08-11 NOTE — TELEPHONE ENCOUNTER
RN left  for patient requesting call back to clin.    RN called to discuss below schedule for in clinic visit      RN huddled with Dr. Gomez    Patient has been referred to podiatry and ortho, sees nephrology.  They should be able to address this.    Ifs patient needing a new wheelchair? Is it broken.  Patient will need a clinic face to face visit to address this    What are the dressings for?  If for Dialysis catheter, Nephrology should address.    OK to place Care Coordination referral.  Pended for when patient returns call and is discussed with him    Phil Daniel, RN, BSN, PHN  [facility]

## 2022-08-12 ENCOUNTER — PATIENT OUTREACH (OUTPATIENT)
Dept: CARE COORDINATION | Facility: CLINIC | Age: 32
End: 2022-08-12

## 2022-08-12 RX ORDER — DEXTROAMPHETAMINE SACCHARATE, AMPHETAMINE ASPARTATE MONOHYDRATE, DEXTROAMPHETAMINE SULFATE AND AMPHETAMINE SULFATE 7.5; 7.5; 7.5; 7.5 MG/1; MG/1; MG/1; MG/1
30 CAPSULE, EXTENDED RELEASE ORAL DAILY
Qty: 30 CAPSULE | Refills: 0 | Status: CANCELLED | OUTPATIENT
Start: 2022-08-12

## 2022-08-12 NOTE — PROGRESS NOTES
Clinic Care Coordination Contact  [facility]/Voicemail    Reason for Referral: Complex Medical Concerns/Education   Complex Medical Concerns: help navagating raz system and organizing apppointments   Clinical Staff have discussed the Care Coordination Referral with the patient and/or caregiver: No   ______________________________________________________    Clinical Data: Care Coordination Outreach  Outreach attempted x 1.  Covering CHW left message on patient's voicemail with call back information for assigned CHW and requested return call.  Plan: CHW will try to reach patient again in 1-2 business days.    Monica BOLANOS  Community Health Worker  [employer]  Clinic Care Coordination  [facility]  [email or web address]   Office: [phone]

## 2022-08-12 NOTE — TELEPHONE ENCOUNTER
Routing to PCP-     Pt calling back stating his insurance will not cover 30mg Adderall extended release for BID dosing since it is extended release. They will only cover it a once a day dosing    New rx pending for 30mg ER 1 time a day.    Jo Wills RN

## 2022-08-12 NOTE — PROGRESS NOTES
Clinic Care Coordination Contact  Community Health Worker Initial Outreach    CHW Initial Information Gathering:  Referral Source: PCP  Current living arrangement:: I live alone  Type of residence:: Apartment  Equipment Currently Used at Home: wheelchair, manual, walker, standard  Transportation means:: Medical transport ([email or web address])  CHW Additional Questions  [employer] active?: Yes  Patient sent Social Determinants of Health questionnaire?: Yes    Patient accepts CC: Yes. Patient scheduled for assessment with MAI Mullen on 8/17/22 at 2:30pm. Patient noted desire to discuss transitioning out of a nursing home.     CHW notes:    Patient states that he has recently moved out of a nursing home into his own private space, which has been a difficult transition for him. He currently has transportation set up for medical appointments through a company called [employer]. Patient states that he has dialysis every Tuesday, Thursday, and Saturday. However, the patient states that he needs help navigating through everything else- food resources, finances and disability benefits. He has applied for disability benefits, but [facility]. Patient states his desire to obtain assistance in understanding his disability benefits.    Patient mentioned that he is currently working with a  (patient could not recall the person's job title). Patient states that this person has been able to assist him with community resources as well.    BLANE Zendejas, B.A. [facility]  Clinic Care Coordination  [facility]:   [facility]  [phone]

## 2022-08-13 ENCOUNTER — HEALTH MAINTENANCE LETTER (OUTPATIENT)
Age: 32
End: 2022-08-13

## 2022-08-14 NOTE — TELEPHONE ENCOUNTER
He had picked up his medications  Adderall, XR 30 mg, once a day, was picked on  08/12/2022  And aderrall, short release 20 gm bid,   thanks

## 2022-08-15 ENCOUNTER — OFFICE VISIT (OUTPATIENT)
Dept: PODIATRY | Facility: CLINIC | Age: 32
End: 2022-08-15
Payer: COMMERCIAL

## 2022-08-15 DIAGNOSIS — M21.6X1 ACQUIRED BILATERAL PES CAVUS: Primary | ICD-10-CM

## 2022-08-15 DIAGNOSIS — M21.6X2 ACQUIRED BILATERAL PES CAVUS: Primary | ICD-10-CM

## 2022-08-15 PROCEDURE — 99203 OFFICE O/P NEW LOW 30 MIN: CPT | Performed by: PODIATRIST

## 2022-08-15 NOTE — LETTER
8/15/2022         RE: Dax Villareal  [address]  [place] [address]        Dear Colleague,    Thank you for referring your patient, Dax Villareal, to the [facility]. Please see a copy of my visit note below.    Assessment:      ICD-10-CM    1. Acquired bilateral pes cavus  M21.6X1 [facility]otics [facility] Prosthetics DME Orthotic; Foot Orthotics    M21.6X2         Plan:  Orders Placed This Encounter   Procedures     Orthotics and Prosthetics DME Orthotic; Foot Orthotics       Discussed the etiology and treatment of the condition with the patient.  Imaging studies reviewed and discussed with the patient.  Discussed surgical and conservative options.    Very poor surgical candidate - smoker, chronic pain / sunstance abuse      -Rx brace    Follow-up with PCP for chronic pain, hospital follow-up      Return:  No follow-ups on file.    Karen Anton DPM                Chief Complaint:     Patient presents with:  Left Foot - Foot Problems  Left Ankle - Foot Pain     bilateral foot pain    HPI:  Dax Villareal is a 31 year old year old male who presents for evaluation of foot pain.    Looking for pain relief in his feet    In line to see pain specialists        Past Medical & Surgical History:  Past Medical History:   Diagnosis Date     ADHD 2008     Alcohol abuse, in remission      Alcoholic cirrhosis of liver with ascites (H)      Anemia in chronic kidney disease      Chronic pain      Current smoker      End stage renal disease (H)      Hypotension, unspecified hypotension type      Malnutrition (H)      Metabolic acidosis      Necrotizing pancreatitis      Pancreatic insufficiency      Pericarditis      Recurrent pleural effusion       Past Surgical History:   Procedure Laterality Date     COMBINED ESOPHAGOSCOPY, GASTROSCOPY, DUODENOSCOPY (EGD), TISSUE APPOSI N/A 9/20/2021    Procedure: ESOPHAGOGASTRODUODENOSCOPY WITH SUTURE FISTULA CLOSURE, argon plasma coagulation;  Surgeon:  Jose Quigley MD;  [facility]     ENDOSCOPIC INSERTION TUBE GASTROSTOMY N/A 7/13/2021    Procedure: Upper endoscopy, INSERTION, PEG-J TUBE and Gastropexy;  Surgeon: Rayshawn Will MD;  [facility]     ENDOSCOPIC INSERTION TUBE GASTROSTOMY  9/13/2021    Procedure: PEG/J PLACEMENT.;  Surgeon: Rayshawn Will MD;  [facility]     ENDOSCOPIC ULTRASOUND UPPER GASTROINTESTINAL TRACT (GI) N/A 7/21/2021    Procedure: ENDOSCOPIC ULTRASOUND, ESOPHAGOSCOPY / UPPER GASTROINTESTINAL TRACT (GI) with cyst gastrostomy, dilation;  Surgeon: Guru Yecenia Chacko MD;  [facility]     ENDOSCOPIC ULTRASOUND, ESOPHAGOSCOPY, GASTROSCOPY, DUODENOSCOPY (EGD), NECROSECTOMY N/A 8/11/2021    Procedure: ESOPHAGOGASTRODUODENOSCOPY (EGD) with necrosectomy, stent exchange.;  Surgeon: Amadou Beasley MD;  [facility]     ESOPHAGOSCOPY, GASTROSCOPY, DUODENOSCOPY (EGD), COMBINED N/A 7/28/2021    Procedure: ESOPHAGOGASTRODUODENOSCOPY (EGD), gastrostomy apposition, Necrosectomy, stent exchange.;  Surgeon: Rayshawn Will MD;  [facility]     ESOPHAGOSCOPY, GASTROSCOPY, DUODENOSCOPY (EGD), COMBINED N/A 8/6/2021    Procedure: ESOPHAGOGASTRODUODENOSCOPY (EGD) with necrosectomy, and stents exchanged;  Surgeon: Jose Quigley MD;  [facility]     ESOPHAGOSCOPY, GASTROSCOPY, DUODENOSCOPY (EGD), COMBINED Left 9/13/2021    Procedure: ESOPHAGOGASTRODUODENOSCOPY (EGD), GASTROSTOMY TUBE REMOVAL, FISTULAR CLOSURE ENDOSCOPIC.;  Surgeon: Rayshawn Will MD;  [facility]     IR ABSCESS TUBE CHANGE  7/14/2021     IR PARACENTESIS  7/21/2021     IR SINOGRAM INJECTION DIAGNOSTIC  8/25/2021     ORTHOPEDIC SURGERY Right     fracture repair     REPLACE GASTROSTOMY TUBE, PERCUTANEOUS N/A 7/28/2021    Procedure: gastro-jejunostomy tube exchange;  Surgeon: Rayshawn Will MD;  [facility]      Family History   Problem Relation Age of Onset     Attention Deficit Disorder Mother       "Alcoholism Mother      Alcoholism Father      Alcoholism Brother      Alcoholism Sister         Social History:  ?  History   Smoking Status     Current Every Day Smoker     Packs/day: 0.25     Types: Cigarettes   Smokeless Tobacco     Never Used     Comment: 8/24/2021 Patient did not want to discuss quitting but would like 4 mg lozenge to use during admission. Patient also accepted \"Quitting for Good\" workbook     History   Drug Use No     Social History    Substance and Sexual Activity      Alcohol use: Not Currently        Comment: \"no alcohol for over 1 year ago\" per pt on 5/12/22      Allergies:  ?   No Known Allergies     Medications:    Current Outpatient Medications   Medication     acetaminophen (TYLENOL) 325 MG/10.15ML solution     amphetamine-dextroamphetamine (ADDERALL XR) 30 MG 24 hr capsule     amphetamine-dextroamphetamine (ADDERALL) 20 MG tablet     amylase-lipase-protease (CREON 24) 27243-56619 units CPEP per EC capsule     Buprenorphine HCl (BELBUCA) 150 MCG FILM buccal film     Cetaphil Moisturizing (CETAPHIL) external lotion     DULoxetine (CYMBALTA) 60 MG capsule     folic acid (FOLVITE) 1 MG tablet     Guar Gum (FIBER MODULAR, NUTRISOURCE FIBER,) packet     hydrOXYzine (ATARAX) 25 MG tablet     lactulose (CHRONULAC) 10 GM/15ML solution     midodrine (PROAMATINE) 5 MG tablet     pantoprazole (PROTONIX) 20 MG EC tablet     potassium chloride (KLOR-CON) 20 MEQ packet     prochlorperazine (COMPAZINE) 5 MG tablet     protein modular (PROSOURCE TF FREE) LIQD     rOPINIRole (REQUIP) 0.25 MG tablet     sevelamer carbonate, RENVELA, 0.8 GM PACK Packet     silver nitrate (ARZOL) 75-25 % miscellaneous     sodium bicarbonate 325 MG tablet     thiamine (B-1) 100 MG tablet     tiZANidine (ZANAFLEX) 2 MG tablet     torsemide 40 MG TABS     vitamin (B COMPLEX) capsule     zinc oxide (DESITIN) 40 % external ointment     No current facility-administered medications for this visit.       Physical Exam:  " ?  Vitals:  There were no vitals taken for this visit.   General:  WD/WN, in NAD.  A&O x3.  Dermatologic:    Skin is intact, open lesions absent.   Skin texture, turgor is normal.  Vascular:  Pulses palpable bilateral.  Digital capillary refill time normal bilateral.  Neurologic:    Gross sensation normal.  Gait and balance abnormal, NWB - scooter.  Musculoskeletal:  Severe cavus b/l  Anterior extensors weak 4/5 b/l                Again, thank you for allowing me to participate in the care of your patient.        Sincerely,        Karen Anton DPM

## 2022-08-15 NOTE — PROGRESS NOTES
Assessment:      ICD-10-CM    1. Acquired bilateral pes cavus  M21.6X1 Orthotics and Prosthetics DME Orthotic; Foot Orthotics    M21.6X2         Plan:  Orders Placed This Encounter   Procedures     Orthotics and Prosthetics DME Orthotic; Foot Orthotics       Discussed the etiology and treatment of the condition with the patient.  Imaging studies reviewed and discussed with the patient.  Discussed surgical and conservative options.    Very poor surgical candidate - smoker, chronic pain / sunstance abuse      -Rx brace    Follow-up with PCP for chronic pain, hospital follow-up      Return:  No follow-ups on file.    Karen Anton DPM                Chief Complaint:     Patient presents with:  Left Foot - Foot Problems  Left Ankle - Foot Pain     bilateral foot pain    HPI:  Dax Villareal is a 31 year old year old male who presents for evaluation of foot pain.    Looking for pain relief in his feet    In line to see pain specialists        Past Medical & Surgical History:  Past Medical History:   Diagnosis Date     ADHD 2008     Alcohol abuse, in remission      Alcoholic cirrhosis of liver with ascites (H)      Anemia in chronic kidney disease      Chronic pain      Current smoker      End stage renal disease (H)      Hypotension, unspecified hypotension type      Malnutrition (H)      Metabolic acidosis      Necrotizing pancreatitis      Pancreatic insufficiency      Pericarditis      Recurrent pleural effusion       Past Surgical History:   Procedure Laterality Date     COMBINED ESOPHAGOSCOPY, GASTROSCOPY, DUODENOSCOPY (EGD), TISSUE APPOSI N/A 9/20/2021    Procedure: ESOPHAGOGASTRODUODENOSCOPY WITH SUTURE FISTULA CLOSURE, argon plasma coagulation;  Surgeon: Jose Quigley MD;  [facility]     ENDOSCOPIC INSERTION TUBE GASTROSTOMY N/A 7/13/2021    Procedure: Upper endoscopy, INSERTION, PEG-J TUBE and Gastropexy;  Surgeon: Rayshawn Will MD;  [facility]     ENDOSCOPIC INSERTION TUBE GASTROSTOMY   9/13/2021    Procedure: PEG/J PLACEMENT.;  Surgeon: Rayshawn Will MD;  [facility]     ENDOSCOPIC ULTRASOUND UPPER GASTROINTESTINAL TRACT (GI) N/A 7/21/2021    Procedure: ENDOSCOPIC ULTRASOUND, ESOPHAGOSCOPY / UPPER GASTROINTESTINAL TRACT (GI) with cyst gastrostomy, dilation;  Surgeon: Guru Yecenia Chacko MD;  [facility]     ENDOSCOPIC ULTRASOUND, ESOPHAGOSCOPY, GASTROSCOPY, DUODENOSCOPY (EGD), NECROSECTOMY N/A 8/11/2021    Procedure: ESOPHAGOGASTRODUODENOSCOPY (EGD) with necrosectomy, stent exchange.;  Surgeon: Amadou Beasley MD;  [facility]     ESOPHAGOSCOPY, GASTROSCOPY, DUODENOSCOPY (EGD), COMBINED N/A 7/28/2021    Procedure: ESOPHAGOGASTRODUODENOSCOPY (EGD), gastrostomy apposition, Necrosectomy, stent exchange.;  Surgeon: Rayshawn Will MD;  [facility]     ESOPHAGOSCOPY, GASTROSCOPY, DUODENOSCOPY (EGD), COMBINED N/A 8/6/2021    Procedure: ESOPHAGOGASTRODUODENOSCOPY (EGD) with necrosectomy, and stents exchanged;  Surgeon: Jose Quigley MD;  [facility]     ESOPHAGOSCOPY, GASTROSCOPY, DUODENOSCOPY (EGD), COMBINED Left 9/13/2021    Procedure: ESOPHAGOGASTRODUODENOSCOPY (EGD), GASTROSTOMY TUBE REMOVAL, FISTULAR CLOSURE ENDOSCOPIC.;  Surgeon: Rayshawn Will MD;  [facility]     IR ABSCESS TUBE CHANGE  7/14/2021     IR PARACENTESIS  7/21/2021     IR SINOGRAM INJECTION DIAGNOSTIC  8/25/2021     ORTHOPEDIC SURGERY Right     fracture repair     REPLACE GASTROSTOMY TUBE, PERCUTANEOUS N/A 7/28/2021    Procedure: gastro-jejunostomy tube exchange;  Surgeon: Rayshawn Will MD;  [facility]      Family History   Problem Relation Age of Onset     Attention Deficit Disorder Mother      Alcoholism Mother      Alcoholism Father      Alcoholism Brother      Alcoholism Sister         Social History:  ?  History   Smoking Status     Current Every Day Smoker     Packs/day: 0.25     Types: Cigarettes   Smokeless Tobacco     Never Used     Comment:  "8/24/2021 Patient did not want to discuss quitting but would like 4 mg lozenge to use during admission. Patient also accepted \"Quitting for Good\" workbook     History   Drug Use No     Social History    Substance and Sexual Activity      Alcohol use: Not Currently        Comment: \"no alcohol for over 1 year ago\" per pt on 5/12/22      Allergies:  ?   No Known Allergies     Medications:    Current Outpatient Medications   Medication     acetaminophen (TYLENOL) 325 MG/10.15ML solution     amphetamine-dextroamphetamine (ADDERALL XR) 30 MG 24 hr capsule     amphetamine-dextroamphetamine (ADDERALL) 20 MG tablet     amylase-lipase-protease (CREON 24) 68637-10401 units CPEP per EC capsule     Buprenorphine HCl (BELBUCA) 150 MCG FILM buccal film     Cetaphil Moisturizing (CETAPHIL) external lotion     DULoxetine (CYMBALTA) 60 MG capsule     folic acid (FOLVITE) 1 MG tablet     Guar Gum (FIBER MODULAR, NUTRISOURCE FIBER,) packet     hydrOXYzine (ATARAX) 25 MG tablet     lactulose (CHRONULAC) 10 GM/15ML solution     midodrine (PROAMATINE) 5 MG tablet     pantoprazole (PROTONIX) 20 MG EC tablet     potassium chloride (KLOR-CON) 20 MEQ packet     prochlorperazine (COMPAZINE) 5 MG tablet     protein modular (PROSOURCE TF FREE) LIQD     rOPINIRole (REQUIP) 0.25 MG tablet     sevelamer carbonate, RENVELA, 0.8 GM PACK Packet     silver nitrate (ARZOL) 75-25 % miscellaneous     sodium bicarbonate 325 MG tablet     thiamine (B-1) 100 MG tablet     tiZANidine (ZANAFLEX) 2 MG tablet     torsemide 40 MG TABS     vitamin (B COMPLEX) capsule     zinc oxide (DESITIN) 40 % external ointment     No current facility-administered medications for this visit.       Physical Exam:  ?  Vitals:  There were no vitals taken for this visit.   General:  WD/WN, in NAD.  A&O x3.  Dermatologic:    Skin is intact, open lesions absent.   Skin texture, turgor is normal.  Vascular:  Pulses palpable bilateral.  Digital capillary refill time normal " bilateral.  Neurologic:    Gross sensation normal.  Gait and balance abnormal, NWB - scooter.  Musculoskeletal:  Severe cavus b/l  Anterior extensors weak 4/5 b/l

## 2022-08-15 NOTE — PATIENT INSTRUCTIONS
PATIENT INSTRUCTIONS - Podiatry / Foot & Ankle Surgery            [facility]  [facility]  [address]  [place] [address]  Phone: [phone]  Fax: [phone] [facility]  [address]  [place] [address]  Phone: [phone]   Fax: [phone]   [facility]  [address]  [place] [address]  Phone: [phone]  Fax: [phone] [facility]  [address]  [place] [address]  Phone: [phone]   Fax: [phone]   [facility]   [address]  [place] [address]  Phone: [phone]  Fax: [phone] * Please call any location listed to make an appointment for a casting/fitting. Your referral was sent to their central office and they will all have the order on file.

## 2022-08-16 ENCOUNTER — TELEPHONE (OUTPATIENT)
Dept: FAMILY MEDICINE | Facility: CLINIC | Age: 32
End: 2022-08-16

## 2022-08-16 DIAGNOSIS — R21 RASH: Primary | ICD-10-CM

## 2022-08-16 RX ORDER — TORSEMIDE 20 MG/1
TABLET ORAL
Qty: 180 TABLET | Refills: 3 | Status: SHIPPED | OUTPATIENT
Start: 2022-08-16

## 2022-08-16 RX ORDER — PREGABALIN 50 MG/1
CAPSULE ORAL
Qty: 180 CAPSULE | Refills: 1 | Status: SHIPPED | OUTPATIENT
Start: 2022-08-16 | End: 2022-09-26

## 2022-08-16 RX ORDER — TRIAMCINOLONE ACETONIDE 1 MG/G
CREAM TOPICAL
Qty: 30 G | Refills: 0 | Status: SHIPPED | OUTPATIENT
Start: 2022-08-16 | End: 2024-01-30

## 2022-08-16 NOTE — TELEPHONE ENCOUNTER
Routing to Dr Gomez.  Also see message below.       Patient is calling and reiterating all the same information from below. Patient has a rash(dry spot) behind his ear. He also has a acne on his nose. Patient is requesting a face cream.  [facility]/ [address] [place] [address] 10 /[place] [address].    SEBASTIAN Hernandez  [facility]

## 2022-08-16 NOTE — TELEPHONE ENCOUNTER
Routing to PCP -     Pt calling in stating he was discharged from nursing home 2 weeks ago. He was seeing a pain management provider in the nursing home and has had to get re established with a new pain doctor but can not see them until Sept. 19th.     Pt just saw Podiatry yesterday 8/15/22 and was diagnosed with Acquired bilateral pes cavus.   He was referred to [facility] for custom shoes, but needs someone to manage the pain in his feet until the visit with pain and orthotics occur.     His previous pain doctor in the nursing home prescribed him Buprenorphine (Belbuca), but states this is not really controlling his pain.     Is their something else you could prescribe to control the pain. He uses [facility].     Pt is scheduled to see you next on 8/31/22.    Jo Wills RN

## 2022-08-17 ENCOUNTER — PATIENT OUTREACH (OUTPATIENT)
Dept: CARE COORDINATION | Facility: CLINIC | Age: 32
End: 2022-08-17

## 2022-08-17 NOTE — LETTER
[facility] COORDIN[facility]  [address] [address]      August 30, 2022    Dax Villareal  [address]   [place] [address]      Dear Dax,    I am a clinic community health worker who works with Kole Gomez MD with the [facility]. I wanted to provide you with my contact information to call with any support or resource needs you may have.  Below is a description of clinic care coordination and how our team can further assist you.       The clinic care coordination team is made up of a registered nurse, , financial resource worker and community health worker who understand the health care system. The goal of clinic care coordination is to help you manage your health and improve access to the health care system. Our team works alongside your provider to assist you in determining your health and social needs. We can help you obtain health care and community resources, providing you with necessary information and education. We can work with you through any barriers and develop a care plan that helps coordinate and strengthen the communication between you and your care team.    Please feel free to contact me regarding care coordination and what we can offer.      We are focused on providing you with the highest-quality healthcare experience possible.    Sincerely,       Monica BOLANOS  Community Health Worker  [facility]  Clinic Care Coordination  [facility]  [email or web address]  [email or web address]   [phone]

## 2022-08-18 NOTE — PROGRESS NOTES
Clinic Care Coordination Contact  [facility]/Voicemail       Clinical Data: Care Coordinator Outreach.  Patient did not show for scheduled SW phone assessment   Outreach attempted x 2.  Left message on patient's voicemail with call back information and requested return call.  SW provided the # for the CHW to call and reschedule  Plan: Care Coordinator will route to CHW to call and reschedule     DARNELL Heart, MSW   [facility]  Care Coordination  [facility]  [phone]  8/18/2022 4:34 PM

## 2022-08-22 ENCOUNTER — TELEPHONE (OUTPATIENT)
Dept: ANESTHESIOLOGY | Facility: CLINIC | Age: 32
End: 2022-08-22

## 2022-08-22 NOTE — TELEPHONE ENCOUNTER
I called patient to remind them of there appointment at 10:20 am to arrive 15-20 minutes prior allow time for parking    Also to complete there questionnaire prior to there appointment

## 2022-08-23 ENCOUNTER — TELEPHONE (OUTPATIENT)
Dept: FAMILY MEDICINE | Facility: CLINIC | Age: 32
End: 2022-08-23

## 2022-08-23 ENCOUNTER — OFFICE VISIT (OUTPATIENT)
Dept: ANESTHESIOLOGY | Facility: CLINIC | Age: 32
End: 2022-08-23
Attending: FAMILY MEDICINE
Payer: COMMERCIAL

## 2022-08-23 VITALS — SYSTOLIC BLOOD PRESSURE: 124 MMHG | HEART RATE: 94 BPM | OXYGEN SATURATION: 99 % | DIASTOLIC BLOOD PRESSURE: 80 MMHG

## 2022-08-23 DIAGNOSIS — Z99.2 ESRD (END STAGE RENAL DISEASE) ON DIALYSIS (H): ICD-10-CM

## 2022-08-23 DIAGNOSIS — G89.28 OTHER CHRONIC POSTPROCEDURAL PAIN: ICD-10-CM

## 2022-08-23 DIAGNOSIS — G62.9 PERIPHERAL POLYNEUROPATHY: ICD-10-CM

## 2022-08-23 DIAGNOSIS — N18.6 ESRD (END STAGE RENAL DISEASE) ON DIALYSIS (H): ICD-10-CM

## 2022-08-23 PROCEDURE — 99203 OFFICE O/P NEW LOW 30 MIN: CPT | Performed by: ANESTHESIOLOGY

## 2022-08-23 ASSESSMENT — ENCOUNTER SYMPTOMS
FEVER: 0
TASTE DISTURBANCE: 0
NERVOUS/ANXIOUS: 1
PARALYSIS: 0
SKIN CHANGES: 0
WEIGHT GAIN: 0
VOMITING: 1
HOARSE VOICE: 0
DIARRHEA: 0
BOWEL INCONTINENCE: 0
SPEECH CHANGE: 0
RECTAL PAIN: 0
ALTERED TEMPERATURE REGULATION: 1
ORTHOPNEA: 0
SMELL DISTURBANCE: 0
LOSS OF CONSCIOUSNESS: 0
HALLUCINATIONS: 0
STIFFNESS: 1
NECK MASS: 0
SORE THROAT: 1
POOR WOUND HEALING: 0
WEAKNESS: 1
NAIL CHANGES: 0
BACK PAIN: 1
DECREASED APPETITE: 0
DIZZINESS: 1
DIFFICULTY URINATING: 0
FLANK PAIN: 1
TINGLING: 1
CONSTIPATION: 0
BLOATING: 0
INCREASED ENERGY: 1
SEIZURES: 0
HEMATURIA: 0
POLYPHAGIA: 0
DEPRESSION: 0
ARTHRALGIAS: 1
TROUBLE SWALLOWING: 1
PALPITATIONS: 0
DYSURIA: 1
SINUS PAIN: 0
BLOOD IN STOOL: 0
TREMORS: 1
JOINT SWELLING: 0
WEIGHT LOSS: 0
INSOMNIA: 1
LEG PAIN: 1
PANIC: 1
NUMBNESS: 1
JAUNDICE: 0
CHILLS: 1
DISTURBANCES IN COORDINATION: 1
ABDOMINAL PAIN: 0
FATIGUE: 1
HEARTBURN: 1
POLYDIPSIA: 1
NECK PAIN: 0
HEADACHES: 1
MUSCLE CRAMPS: 1
MYALGIAS: 1
MEMORY LOSS: 1
MUSCLE WEAKNESS: 1
NIGHT SWEATS: 0
SINUS CONGESTION: 1
DECREASED CONCENTRATION: 1
NAUSEA: 1

## 2022-08-23 ASSESSMENT — ANXIETY QUESTIONNAIRES
2. NOT BEING ABLE TO STOP OR CONTROL WORRYING: SEVERAL DAYS
GAD7 TOTAL SCORE: 19
4. TROUBLE RELAXING: NEARLY EVERY DAY
6. BECOMING EASILY ANNOYED OR IRRITABLE: NEARLY EVERY DAY
IF YOU CHECKED OFF ANY PROBLEMS ON THIS QUESTIONNAIRE, HOW DIFFICULT HAVE THESE PROBLEMS MADE IT FOR YOU TO DO YOUR WORK, TAKE CARE OF THINGS AT HOME, OR GET ALONG WITH OTHER PEOPLE: EXTREMELY DIFFICULT
5. BEING SO RESTLESS THAT IT IS HARD TO SIT STILL: NEARLY EVERY DAY
3. WORRYING TOO MUCH ABOUT DIFFERENT THINGS: NEARLY EVERY DAY
7. FEELING AFRAID AS IF SOMETHING AWFUL MIGHT HAPPEN: NEARLY EVERY DAY
GAD7 TOTAL SCORE: 19
8. IF YOU CHECKED OFF ANY PROBLEMS, HOW DIFFICULT HAVE THESE MADE IT FOR YOU TO DO YOUR WORK, TAKE CARE OF THINGS AT HOME, OR GET ALONG WITH OTHER PEOPLE?: EXTREMELY DIFFICULT
7. FEELING AFRAID AS IF SOMETHING AWFUL MIGHT HAPPEN: NEARLY EVERY DAY
1. FEELING NERVOUS, ANXIOUS, OR ON EDGE: NEARLY EVERY DAY

## 2022-08-23 ASSESSMENT — PAIN SCALES - PAIN ENJOYMENT GENERAL ACTIVITY SCALE (PEG)
INTERFERED_GENERAL_ACTIVITY: 10
INTERFERED_GENERAL_ACTIVITY: 10 - COMPLETELY INTERFERES
PEG_TOTALSCORE: 9.67
AVG_PAIN_PASTWEEK: 9
AVG_PAIN_PASTWEEK: 9
PEG_TOTALSCORE: 9.67
INTERFERED_ENJOYMENT_LIFE: 10
INTERFERED_ENJOYMENT_LIFE: 10 - COMPLETELY INTERFERES

## 2022-08-23 ASSESSMENT — PAIN SCALES - GENERAL: PAINLEVEL: EXTREME PAIN (8)

## 2022-08-23 NOTE — TELEPHONE ENCOUNTER
Pt callign after pain clinic visit.      He reprots they told him to follow up with PCP for pain. Advised him they did not and went over recommendations with him again.      1. Medications: The patient is on belbuca from the rehab center. His is trying to get medical marijuana approved for pain. He does not like the belbuca and the limitations with belbuca. The patient was also prescribed pregabalin by the pcp Dr. Gomez. He has not picked up with prescriptions.      2. Procedure: None recommended at this time.      3. Physical therapy: Referral to the chronic pain program at [facility].     He also states he has a referral to see Dr Muñoz for medical marijuana and wanted to schedule with her.  Advised him I do not see a referral for this as it is a Provider to Provider referral and we cannot schedule him for this without Dr Juárez permission.    HE should  his pain medication pregabalin for DR Gomez and follow recommendations from pain clinic today.        Brianna Bradley RN

## 2022-08-23 NOTE — NURSING NOTE
RN reviewed AVS with patient. Patient to contact clinic if any questions/concerns. Patient verbalized understanding.    Danuta Key RN

## 2022-08-23 NOTE — LETTER
8/23/2022       RE: Dax Villareal  [address]  [place] [address]     Dear Colleague,    Thank you for referring your patient, Dax Villareal, to the [facility] FOR COMPREHENSIVE PAIN MANAGEMENT [place] at [facility]. Please see a copy of my visit note below.      Pain Clinic New Patient Consult Note:    Referring Provider: Jason   Primary care provider: Kole Gomez.    Dax Villareal is a 31 year old y.o. old male who presents to the pain clinic with chronic pain in the abdomen and lower limbs.     HPI:  Patient Supplied Answers To the [facility] Pain Questionnaire  [facility] Pain -  Patient Entered Questionnaire/Answers 8/23/2022   What number best describes your pain right now:  0 = No pain  to  10 = Worst pain imaginable 7   How would you describe the pain? numbness   Which of the following worsen your pain? lying down, standing, sitting, walking, exercise, medications, relaxation, thinking about something else, coughing / sneezing, urination, bowel movements, nothing worsens the pain   Which of the following improve or reduce your pain? lying down, standing, sitting, walking, exercise, medication, relaxation, thinking about something else, coughing / sneezing, urination, bowel movements   What number best describes your average pain for the past week:  0 = No pain  to  10 = Worst pain imaginable 8   What number best describes your LOWEST pain in past 24 hours:  0 = No pain  to  10 = Worst pain imaginable 4   What number best describes your WORST pain in past 24 hours:  0 = No pain  to  10 = Worst pain imaginable 8   When is your pain worst? AM, Night, Constant   What non-medicine treatments have you already had for your pain? pain clinic, physical therapy, relaxation training, counseling, surgery, exercise, other     Dax is a 31 year old male presenting to the clinic with chronic pain and recently discharged from rehab. He has a complex medical  "history of needing kidney transplant, alcohol cirrhosis, current smoking, necrotizing pancreatitis and recurrent pleural effusions. He states that he is coming to the pain clinic looking for \"relief\". He feels that he has been neglected. The patient feels that he has a lot of medical care going on. The patient reports that the feet were recently diagnosed with pes cavus. The patient is now in the midst of establishing care for GI and transplant here at the [facility]. He would like a \"[facility] pass for PT and massage for feet pain\".     Pain treatments:    Herbal medicines: used marijuana products of another patient. He is planning to get medical marijuana from another physician for ptsd and pain  Physical therapy: recently at the rehab center  Chiropractor: none  Pain physician: Dr. Hercules at the rehab facility      Significant Medical History:   Past Medical History:   Diagnosis Date     ADHD 2008     Alcohol abuse, in remission      Alcoholic cirrhosis of liver with ascites (H)      Anemia in chronic kidney disease      Chronic pain      Current smoker      End stage renal disease (H)      Hypotension, unspecified hypotension type      Malnutrition (H)      Metabolic acidosis      Necrotizing pancreatitis      Pancreatic insufficiency      Pericarditis      Recurrent pleural effusion           Past Surgical History:  Past Surgical History:   Procedure Laterality Date     COMBINED ESOPHAGOSCOPY, GASTROSCOPY, DUODENOSCOPY (EGD), TISSUE APPOSI N/A 9/20/2021    Procedure: ESOPHAGOGASTRODUODENOSCOPY WITH SUTURE FISTULA CLOSURE, argon plasma coagulation;  Surgeon: Jose Quigley MD;  Location: [facility] OR     ENDOSCOPIC INSERTION TUBE GASTROSTOMY N/A 7/13/2021    Procedure: Upper endoscopy, INSERTION, PEG-J TUBE and Gastropexy;  Surgeon: Rayshawn Will MD;  Location: [facility] OR     ENDOSCOPIC INSERTION TUBE GASTROSTOMY  9/13/2021    Procedure: PEG/J PLACEMENT.;  Surgeon: Rayshawn Will MD;  Location: [facility]     ENDOSCOPIC " ULTRASOUND UPPER GASTROINTESTINAL TRACT (GI) N/A 7/21/2021    Procedure: ENDOSCOPIC ULTRASOUND, ESOPHAGOSCOPY / UPPER GASTROINTESTINAL TRACT (GI) with cyst gastrostomy, dilation;  Surgeon: Guru Yecenia Chacko MD;  Location: [facility]     ENDOSCOPIC ULTRASOUND, ESOPHAGOSCOPY, GASTROSCOPY, DUODENOSCOPY (EGD), NECROSECTOMY N/A 8/11/2021    Procedure: ESOPHAGOGASTRODUODENOSCOPY (EGD) with necrosectomy, stent exchange.;  Surgeon: Amadou Beasley MD;  Location: [facility] OR     ESOPHAGOSCOPY, GASTROSCOPY, DUODENOSCOPY (EGD), COMBINED N/A 7/28/2021    Procedure: ESOPHAGOGASTRODUODENOSCOPY (EGD), gastrostomy apposition, Necrosectomy, stent exchange.;  Surgeon: Rayshawn Will MD;  Location: [facility]     ESOPHAGOSCOPY, GASTROSCOPY, DUODENOSCOPY (EGD), COMBINED N/A 8/6/2021    Procedure: ESOPHAGOGASTRODUODENOSCOPY (EGD) with necrosectomy, and stents exchanged;  Surgeon: Jose Quigley MD;  Location: [facility]     ESOPHAGOSCOPY, GASTROSCOPY, DUODENOSCOPY (EGD), COMBINED Left 9/13/2021    Procedure: ESOPHAGOGASTRODUODENOSCOPY (EGD), GASTROSTOMY TUBE REMOVAL, FISTULAR CLOSURE ENDOSCOPIC.;  Surgeon: Rayshawn Will MD;  Location: [facility]     IR ABSCESS TUBE CHANGE  7/14/2021     IR PARACENTESIS  7/21/2021     IR SINOGRAM INJECTION DIAGNOSTIC  8/25/2021     ORTHOPEDIC SURGERY Right     fracture repair     REPLACE GASTROSTOMY TUBE, PERCUTANEOUS N/A 7/28/2021    Procedure: gastro-jejunostomy tube exchange;  Surgeon: Rayshawn Will MD;  Location: [facility] OR          Family History:  Family History   Problem Relation Age of Onset     Attention Deficit Disorder Mother      Alcoholism Mother      Alcoholism Father      Alcoholism Brother      Alcoholism Sister           Social History:  Social History     Socioeconomic History     Marital status: Single     Spouse name: Not on file     Number of children: Not on file     Years of education: Not on file     Highest education level: Not on file   Occupational History  "    Not on file   Tobacco Use     Smoking status: Current Every Day Smoker     Packs/day: 0.25     Types: Cigarettes     Smokeless tobacco: Never Used     Tobacco comment: 8/24/2021 Patient did not want to discuss quitting but would like 4 mg lozenge to use during admission. Patient also accepted \"Quitting for Good\" workbook   Vaping Use     Vaping Use: Never used     Passive vaping exposure: Yes   Substance and Sexual Activity     Alcohol use: Not Currently     Comment: \"no alcohol for over 1 year ago\" per pt on 5/12/22     Drug use: No     Sexual activity: Not Currently     Partners: Female   Other Topics Concern     Not on file   Social History Narrative     Not on file     Social Determinants of Health     Financial Resource Strain: Not on file   Food Insecurity: Not on file   Transportation Needs: Not on file   Physical Activity: Not on file   Stress: Not on file   Social Connections: Not on file   Intimate Partner Violence: Not on file   Housing Stability: Not on file     Social History     Social History Narrative     Not on file          Allergies:  No Known Allergies    Current Medications:   Current Outpatient Medications   Medication Sig Dispense Refill     acetaminophen (TYLENOL) 325 MG/10.15ML solution 10.15 mLs (325 mg) by Oral or PEGJ tube route every 4 hours as needed for mild pain or fever       amphetamine-dextroamphetamine (ADDERALL XR) 30 MG 24 hr capsule Take 1 capsule (30 mg) by mouth 2 times daily 60 capsule 0     amphetamine-dextroamphetamine (ADDERALL) 20 MG tablet 2 tab at noon. 60 tablet 0     amylase-lipase-protease (CREON 24) 29363-90866 units CPEP per EC capsule 1-2 capsules by Per Feeding Tube route every 4 hours 360 capsule 1     Buprenorphine HCl (BELBUCA) 150 MCG FILM buccal film Place 1 Film (150 mcg) inside cheek every 12 hours       Cetaphil Moisturizing (CETAPHIL) external lotion Apply topically 2 times daily as needed for dry skin or irritation (Apply to chest)       DULoxetine " (CYMBALTA) 60 MG capsule Take 1 capsule (60 mg) by mouth daily 90 capsule 3     folic acid (FOLVITE) 1 MG tablet 1 tablet (1 mg) by Oral or Feeding Tube route daily 90 tablet 3     Guar Gum (FIBER MODULAR, NUTRISOURCE FIBER,) packet 1 packet by Per Feeding Tube route 3 times daily       hydrOXYzine (ATARAX) 25 MG tablet Take 1 tablet (25 mg) by mouth 3 times daily as needed for itching 90 tablet 3     lactulose (CHRONULAC) 10 GM/15ML solution Take 20 g by mouth       midodrine (PROAMATINE) 5 MG tablet 2 tablets (10 mg) by Per Feeding Tube route 3 times daily 270 tablet 3     pantoprazole (PROTONIX) 20 MG EC tablet Take 2 tablets (40 mg) by mouth daily 90 tablet 3     potassium chloride (KLOR-CON) 20 MEQ packet 20 mEq by Per Feeding Tube route daily       pregabalin (LYRICA) 50 MG capsule One tab at bedtime for one wk, then 2 tab at bedtime and 1 tab in  capsule 1     prochlorperazine (COMPAZINE) 5 MG tablet Take 1 tablet (5 mg) by mouth every 8 hours as needed for nausea or vomiting 90 tablet 3     protein modular (PROSOURCE TF FREE) LIQD 1 packet by Per Feeding Tube route daily       rOPINIRole (REQUIP) 0.25 MG tablet Take 1 tablet (0.25 mg) by mouth At Bedtime 90 tablet 3     sevelamer carbonate, RENVELA, 0.8 GM PACK Packet 2 packets (1.6 g) by Oral or Feeding Tube route 3 times daily       silver nitrate (ARZOL) 75-25 % miscellaneous Apply topically daily as needed for wound care       sodium bicarbonate 325 MG tablet 1 tablet (325 mg) by Per Feeding Tube route every 4 hours       thiamine (B-1) 100 MG tablet 1 tablet (100 mg) by Oral or Feeding Tube route daily       tiZANidine (ZANAFLEX) 2 MG tablet Take 1 tablet (2 mg) by mouth 3 times daily 90 tablet 11     torsemide (DEMADEX) 20 MG tablet 2 tab daily 180 tablet 3     triamcinolone (KENALOG) 0.1 % external cream Apply bid for 3- 5 days, as needed 30 g 0     vitamin (B COMPLEX) capsule Take 1 each by mouth            Current Pain  Medications:  Medications related to Pain Management (From now, onward)    None             Review of Systems:  Review of Systems   Constitutional: Positive for chills. Negative for fever and weight loss.   HENT: Positive for ear pain, sore throat and tinnitus. Negative for ear discharge, hearing loss, nosebleeds and sinus pain.    Cardiovascular: Negative for chest pain, palpitations and orthopnea.   Gastrointestinal: Positive for heartburn, nausea and vomiting. Negative for abdominal pain, blood in stool, constipation, diarrhea and melena.   Genitourinary: Positive for dysuria, flank pain and urgency. Negative for hematuria.   Musculoskeletal: Positive for back pain and myalgias. Negative for neck pain.   Skin: Positive for rash. Negative for itching.   Neurological: Positive for dizziness, tingling, tremors, weakness and headaches. Negative for speech change, seizures and loss of consciousness.   Endo/Heme/Allergies: Positive for polydipsia.   Psychiatric/Behavioral: Positive for memory loss. Negative for depression and hallucinations. The patient is nervous/anxious and has insomnia.    All other systems reviewed and are negative.    Answers for HPI/ROS submitted by the patient on 8/23/2022  DIMITRI 7 TOTAL SCORE: 19  General Symptoms: Yes  Skin Symptoms: Yes  HENT Symptoms: Yes  EYE SYMPTOMS: No  HEART SYMPTOMS: Yes  LUNG SYMPTOMS: No  INTESTINAL SYMPTOMS: Yes  URINARY SYMPTOMS: Yes  REPRODUCTIVE SYMPTOMS: No  SKELETAL SYMPTOMS: Yes  BLOOD SYMPTOMS: Yes  NERVOUS SYSTEM SYMPTOMS: Yes  MENTAL HEALTH SYMPTOMS: Yes  Congestion: Yes  Trouble swallowing: Yes   Voice hoarseness: No  Mouth sores: No  Tooth pain: Yes  Gum tenderness: Yes  Bleeding gums: Yes  Change in taste: No  Change in sense of smell: No  Dry mouth: Yes  Hearing aid used: No  Neck lump: No  Loss of appetite: No  Weight gain: No  Fatigue: Yes  Night sweats: No  Increased stress: Yes  Excessive hunger: No  Feeling hot or cold when others believe the  temperature is normal: Yes  Loss of height: No  Post-operative complications: Yes  Surgical site pain: Yes  Change in or Loss of Energy: Yes  Hyperactivity: No  Confusion: Yes  Changes in hair: No  Changes in moles/birth marks: No  Changes in nails: No  Acne: Yes  Change in facial hair: No  Warts: No  Non-healing sores: No  Scarring: Yes  Flaking of skin: Yes  Color changes of hands/feet in cold : Yes  Sun sensitivity: Yes  Skin thickening: Yes  Pain in legs with walking: Yes  Fingers or toes appear blue: No  Bloating: No  Rectal or Anal pain: No  Fecal incontinence: No  Yellowing of skin or eyes: No  Vomit with blood: No  Change in stools: No  Trouble holding urine or incontinence: Yes  Increased frequency of urination: Yes  Decreased frequency of urination: No  Frequent nighttime urination: Yes  Difficulty emptying bladder: No  Swollen joints: No  Joint pain: Yes  Bone pain: No  Muscle cramps: Yes  Muscle weakness: Yes  Joint stiffness: Yes  Bone fracture: No  Anemia: Yes  Trouble with coordination: Yes  Difficulty walking: Yes  Paralysis: No  Numbness: Yes  Trouble thinking or concentrating: Yes  Mood changes: No  Panic attacks: Yes        Physical Exam:     Vitals:    08/23/22 1038   BP: 124/80   BP Location: Right arm   Patient Position: Chair   Cuff Size: Adult Regular   Pulse: 94   SpO2: 99%       General Appearance: No distress, seated comfortably  Mood: Euthymic  HE ENT: Non constricted pupils  Respiratory: Non labored breathing  GI: Soft, non distended, no TTP  Skin: No rashes over exposed skin  MS: reduced muscle mass in the LE  Neuro: moves UE against gravity.   Gait: wheel chair bound.       Laboratory results:  Recent Labs   Lab Test 07/05/22  1023 06/28/22  0839 05/07/22  1055   NA  --  132* 142   POTASSIUM  --  3.5 3.9   CHLORIDE  --  100 101   CO2  --  29 30   ANIONGAP  --  3 11   GLC  --  74 91   BUN  --  41* 19   CR 3.9* 4.47* 4.43*   JANENE  --  11.5* 11.3*       CBC RESULTS:   Recent Labs   Lab  Test 06/28/22  0839   WBC 6.5   RBC 4.17*   HGB 11.8*   HCT 37.2*   MCV 89   MCH 28.3   MCHC 31.7   RDW 14.5            Imaging:       ASSESSMENT AND PLAN:     Encounter Diagnosis:    Chronic pain syndrome  Liver cirrhosis  Necrotizing pancreatitis  ADHD   Depression  Awaiting possible kidney transplant evaluation.     Dax Villareal is a 31 year old y.o. old male who presents to the pain clinic with chronic pain in the abdomen and feet.     RECOMMENDATIONS:     1. Medications: The patient is on belbuca from the rehab center. His is trying to get medical marijuana approved for pain. He does not like the belbuca and the limitations with belbuca. The patient was also prescribed pregabalin by the pcp Dr. Gomez. He has not picked up with prescriptions.     2. Procedure: None recommended at this time.     3. Physical therapy: Referral to the chronic pain program at [facility].     Follow up: as needed.             Sincerely,    Karishma Lopez MD

## 2022-08-23 NOTE — NURSING NOTE
Patient presents with:  Consult: New Consult Abdominal and Feet and back pain level 8/10      Extreme Pain (8)     Pain Medications     Analgesics Other Refills Start End     acetaminophen (TYLENOL) 325 MG/10.15ML solution     10/12/2021     Sig - Route: 10.15 mLs (325 mg) by Oral or PEGJ tube route every 4 hours as needed for mild pain or fever - Oral or PEGJ tube    Class: Transitional    Opioid Partial Agonists Refills Start End     Buprenorphine HCl (BELBUCA) 150 MCG FILM buccal film     7/7/2022     Sig - Route: Place 1 Film (150 mcg) inside cheek every 12 hours - Buccal    Class: Historical          What medications are you using for pain? Buprenorphine    (New patients only) Have you been seen by another pain clinic/ provider? Yes    (Return Patients only) What refills are you needing today? No    Expectation: Get some kind of relief

## 2022-08-23 NOTE — PATIENT INSTRUCTIONS
Referrals:    [facility]. Please call to schedule.      Recommended Follow up:      Follow up as needed.         To speak with a nurse, schedule/reschedule/cancel a clinic appointment, or request a medication refill call: [phone]    You can also reach us by [email or web address]: [email or web address]

## 2022-08-23 NOTE — TELEPHONE ENCOUNTER
Patient called x 3 regarding medical marijuana.  There is nothing in notes from today's pain management about pt being referred to Dr Juárez for medical card.    Pt again told nothing will be done regarding this until a consultation between providers is done.      Brianna Bradley RN

## 2022-08-23 NOTE — PROGRESS NOTES
"  Pain Clinic New Patient Consult Note:    Referring Provider: Jason   Primary care provider: Kole Gomez.    Dax Villareal is a 31 year old y.o. old male who presents to the pain clinic with chronic pain in the abdomen and lower limbs.     HPI:  Patient Supplied Answers To the [facility] Pain Questionnaire  [facility] Pain -  Patient Entered Questionnaire/Answers 8/23/2022   What number best describes your pain right now:  0 = No pain  to  10 = Worst pain imaginable 7   How would you describe the pain? numbness   Which of the following worsen your pain? lying down, standing, sitting, walking, exercise, medications, relaxation, thinking about something else, coughing / sneezing, urination, bowel movements, nothing worsens the pain   Which of the following improve or reduce your pain? lying down, standing, sitting, walking, exercise, medication, relaxation, thinking about something else, coughing / sneezing, urination, bowel movements   What number best describes your average pain for the past week:  0 = No pain  to  10 = Worst pain imaginable 8   What number best describes your LOWEST pain in past 24 hours:  0 = No pain  to  10 = Worst pain imaginable 4   What number best describes your WORST pain in past 24 hours:  0 = No pain  to  10 = Worst pain imaginable 8   When is your pain worst? AM, Night, Constant   What non-medicine treatments have you already had for your pain? pain clinic, physical therapy, relaxation training, counseling, surgery, exercise, other     Dax is a 31 year old male presenting to the clinic with chronic pain and recently discharged from rehab. He has a complex medical history of needing kidney transplant, alcohol cirrhosis, current smoking, necrotizing pancreatitis and recurrent pleural effusions. He states that he is coming to the pain clinic looking for \"relief\". He feels that he has been neglected. The patient feels that he has a lot of medical care going on. The patient reports that the feet were " "recently diagnosed with pes cavus. The patient is now in the midst of establishing care for GI and transplant here at the [facility]. He would like a \"[facility] pass for PT and massage for feet pain\".     Pain treatments:    Herbal medicines: used marijuana products of another patient. He is planning to get medical marijuana from another physician for ptsd and pain  Physical therapy: recently at the rehab center  Chiropractor: none  Pain physician: Dr. Hercules at the rehab facility      Significant Medical History:   Past Medical History:   Diagnosis Date     ADHD 2008     Alcohol abuse, in remission      Alcoholic cirrhosis of liver with ascites (H)      Anemia in chronic kidney disease      Chronic pain      Current smoker      End stage renal disease (H)      Hypotension, unspecified hypotension type      Malnutrition (H)      Metabolic acidosis      Necrotizing pancreatitis      Pancreatic insufficiency      Pericarditis      Recurrent pleural effusion           Past Surgical History:  Past Surgical History:   Procedure Laterality Date     COMBINED ESOPHAGOSCOPY, GASTROSCOPY, DUODENOSCOPY (EGD), TISSUE APPOSI N/A 9/20/2021    Procedure: ESOPHAGOGASTRODUODENOSCOPY WITH SUTURE FISTULA CLOSURE, argon plasma coagulation;  Surgeon: Jose Quigley MD;  Location: [facility]     ENDOSCOPIC INSERTION TUBE GASTROSTOMY N/A 7/13/2021    Procedure: Upper endoscopy, INSERTION, PEG-J TUBE and Gastropexy;  Surgeon: Rayshawn Will MD;  [facility]: [facility]     ENDOSCOPIC INSERTION TUBE GASTROSTOMY  9/13/2021    Procedure: PEG/J PLACEMENT.;  Surgeon: Rayshawn Will MD;  [facility]     ENDOSCOPIC ULTRASOUND UPPER GASTROINTESTINAL TRACT (GI) N/A 7/21/2021    Procedure: ENDOSCOPIC ULTRASOUND, ESOPHAGOSCOPY / UPPER GASTROINTESTINAL TRACT (GI) with cyst gastrostomy, dilation;  Surgeon: Guru Yecenia Chacko MD;  [facility]     ENDOSCOPIC ULTRASOUND, ESOPHAGOSCOPY, GASTROSCOPY, DUODENOSCOPY (EGD), NECROSECTOMY N/A " "8/11/2021    Procedure: ESOPHAGOGASTRODUODENOSCOPY (EGD) with necrosectomy, stent exchange.;  Surgeon: Amadou Beasley MD;  [facility]     ESOPHAGOSCOPY, GASTROSCOPY, DUODENOSCOPY (EGD), COMBINED N/A 7/28/2021    Procedure: ESOPHAGOGASTRODUODENOSCOPY (EGD), gastrostomy apposition, Necrosectomy, stent exchange.;  Surgeon: Rayshawn Will MD;  [facility]     ESOPHAGOSCOPY, GASTROSCOPY, DUODENOSCOPY (EGD), COMBINED N/A 8/6/2021    Procedure: ESOPHAGOGASTRODUODENOSCOPY (EGD) with necrosectomy, and stents exchanged;  Surgeon: Jose Quigley MD;  [facility]     ESOPHAGOSCOPY, GASTROSCOPY, DUODENOSCOPY (EGD), COMBINED Left 9/13/2021    Procedure: ESOPHAGOGASTRODUODENOSCOPY (EGD), GASTROSTOMY TUBE REMOVAL, FISTULAR CLOSURE ENDOSCOPIC.;  Surgeon: Rayshawn Will MD;  [facility]     IR ABSCESS TUBE CHANGE  7/14/2021     IR PARACENTESIS  7/21/2021     IR SINOGRAM INJECTION DIAGNOSTIC  8/25/2021     ORTHOPEDIC SURGERY Right     fracture repair     REPLACE GASTROSTOMY TUBE, PERCUTANEOUS N/A 7/28/2021    Procedure: gastro-jejunostomy tube exchange;  Surgeon: Rayshawn Will MD;  Location: [facility]          Family History:  Family History   Problem Relation Age of Onset     Attention Deficit Disorder Mother      Alcoholism Mother      Alcoholism Father      Alcoholism Brother      Alcoholism Sister           Social History:  Social History     Socioeconomic History     Marital status: Single     Spouse name: Not on file     Number of children: Not on file     Years of education: Not on file     Highest education level: Not on file   Occupational History     Not on file   Tobacco Use     Smoking status: Current Every Day Smoker     Packs/day: 0.25     Types: Cigarettes     Smokeless tobacco: Never Used     Tobacco comment: 8/24/2021 Patient did not want to discuss quitting but would like 4 mg lozenge to use during admission. Patient also accepted \"Quitting for Good\" workbook   Vaping Use " "    Vaping Use: Never used     Passive vaping exposure: Yes   Substance and Sexual Activity     Alcohol use: Not Currently     Comment: \"no alcohol for over 1 year ago\" per pt on 5/12/22     Drug use: No     Sexual activity: Not Currently     Partners: Female   Other Topics Concern     Not on file   Social History Narrative     Not on file     Social Determinants of Health     Financial Resource Strain: Not on file   Food Insecurity: Not on file   Transportation Needs: Not on file   Physical Activity: Not on file   Stress: Not on file   Social Connections: Not on file   Intimate Partner Violence: Not on file   Housing Stability: Not on file     Social History     Social History Narrative     Not on file          Allergies:  No Known Allergies    Current Medications:   Current Outpatient Medications   Medication Sig Dispense Refill     acetaminophen (TYLENOL) 325 MG/10.15ML solution 10.15 mLs (325 mg) by Oral or PEGJ tube route every 4 hours as needed for mild pain or fever       amphetamine-dextroamphetamine (ADDERALL XR) 30 MG 24 hr capsule Take 1 capsule (30 mg) by mouth 2 times daily 60 capsule 0     amphetamine-dextroamphetamine (ADDERALL) 20 MG tablet 2 tab at noon. 60 tablet 0     amylase-lipase-protease (CREON 24) 74196-75901 units CPEP per EC capsule 1-2 capsules by Per Feeding Tube route every 4 hours 360 capsule 1     Buprenorphine HCl (BELBUCA) 150 MCG FILM buccal film Place 1 Film (150 mcg) inside cheek every 12 hours       Cetaphil Moisturizing (CETAPHIL) external lotion Apply topically 2 times daily as needed for dry skin or irritation (Apply to chest)       DULoxetine (CYMBALTA) 60 MG capsule Take 1 capsule (60 mg) by mouth daily 90 capsule 3     folic acid (FOLVITE) 1 MG tablet 1 tablet (1 mg) by Oral or Feeding Tube route daily 90 tablet 3     Guar Gum (FIBER MODULAR, NUTRISOURCE FIBER,) packet 1 packet by Per Feeding Tube route 3 times daily       hydrOXYzine (ATARAX) 25 MG tablet Take 1 tablet (25 " mg) by mouth 3 times daily as needed for itching 90 tablet 3     lactulose (CHRONULAC) 10 GM/15ML solution Take 20 g by mouth       midodrine (PROAMATINE) 5 MG tablet 2 tablets (10 mg) by Per Feeding Tube route 3 times daily 270 tablet 3     pantoprazole (PROTONIX) 20 MG EC tablet Take 2 tablets (40 mg) by mouth daily 90 tablet 3     potassium chloride (KLOR-CON) 20 MEQ packet 20 mEq by Per Feeding Tube route daily       pregabalin (LYRICA) 50 MG capsule One tab at bedtime for one wk, then 2 tab at bedtime and 1 tab in  capsule 1     prochlorperazine (COMPAZINE) 5 MG tablet Take 1 tablet (5 mg) by mouth every 8 hours as needed for nausea or vomiting 90 tablet 3     protein modular (PROSOURCE TF FREE) LIQD 1 packet by Per Feeding Tube route daily       rOPINIRole (REQUIP) 0.25 MG tablet Take 1 tablet (0.25 mg) by mouth At Bedtime 90 tablet 3     sevelamer carbonate, RENVELA, 0.8 GM PACK Packet 2 packets (1.6 g) by Oral or Feeding Tube route 3 times daily       silver nitrate (ARZOL) 75-25 % miscellaneous Apply topically daily as needed for wound care       sodium bicarbonate 325 MG tablet 1 tablet (325 mg) by Per Feeding Tube route every 4 hours       thiamine (B-1) 100 MG tablet 1 tablet (100 mg) by Oral or Feeding Tube route daily       tiZANidine (ZANAFLEX) 2 MG tablet Take 1 tablet (2 mg) by mouth 3 times daily 90 tablet 11     torsemide (DEMADEX) 20 MG tablet 2 tab daily 180 tablet 3     triamcinolone (KENALOG) 0.1 % external cream Apply bid for 3- 5 days, as needed 30 g 0     vitamin (B COMPLEX) capsule Take 1 each by mouth            Current Pain Medications:  Medications related to Pain Management (From now, onward)    None             Review of Systems:  Review of Systems   Constitutional: Positive for chills. Negative for fever and weight loss.   HENT: Positive for ear pain, sore throat and tinnitus. Negative for ear discharge, hearing loss, nosebleeds and sinus pain.    Cardiovascular: Negative for  chest pain, palpitations and orthopnea.   Gastrointestinal: Positive for heartburn, nausea and vomiting. Negative for abdominal pain, blood in stool, constipation, diarrhea and melena.   Genitourinary: Positive for dysuria, flank pain and urgency. Negative for hematuria.   Musculoskeletal: Positive for back pain and myalgias. Negative for neck pain.   Skin: Positive for rash. Negative for itching.   Neurological: Positive for dizziness, tingling, tremors, weakness and headaches. Negative for speech change, seizures and loss of consciousness.   Endo/Heme/Allergies: Positive for polydipsia.   Psychiatric/Behavioral: Positive for memory loss. Negative for depression and hallucinations. The patient is nervous/anxious and has insomnia.    All other systems reviewed and are negative.    Answers for HPI/ROS submitted by the patient on 8/23/2022  DIMITRI 7 TOTAL SCORE: 19  General Symptoms: Yes  Skin Symptoms: Yes  HENT Symptoms: Yes  EYE SYMPTOMS: No  HEART SYMPTOMS: Yes  LUNG SYMPTOMS: No  INTESTINAL SYMPTOMS: Yes  URINARY SYMPTOMS: Yes  REPRODUCTIVE SYMPTOMS: No  SKELETAL SYMPTOMS: Yes  BLOOD SYMPTOMS: Yes  NERVOUS SYSTEM SYMPTOMS: Yes  MENTAL HEALTH SYMPTOMS: Yes  Congestion: Yes  Trouble swallowing: Yes   Voice hoarseness: No  Mouth sores: No  Tooth pain: Yes  Gum tenderness: Yes  Bleeding gums: Yes  Change in taste: No  Change in sense of smell: No  Dry mouth: Yes  Hearing aid used: No  Neck lump: No  Loss of appetite: No  Weight gain: No  Fatigue: Yes  Night sweats: No  Increased stress: Yes  Excessive hunger: No  Feeling hot or cold when others believe the temperature is normal: Yes  Loss of height: No  Post-operative complications: Yes  Surgical site pain: Yes  Change in or Loss of Energy: Yes  Hyperactivity: No  Confusion: Yes  Changes in hair: No  Changes in moles/birth marks: No  Changes in nails: No  Acne: Yes  Change in facial hair: No  Warts: No  Non-healing sores: No  Scarring: Yes  Flaking of skin: Yes  Color  changes of hands/feet in cold : Yes  Sun sensitivity: Yes  Skin thickening: Yes  Pain in legs with walking: Yes  Fingers or toes appear blue: No  Bloating: No  Rectal or Anal pain: No  Fecal incontinence: No  Yellowing of skin or eyes: No  Vomit with blood: No  Change in stools: No  Trouble holding urine or incontinence: Yes  Increased frequency of urination: Yes  Decreased frequency of urination: No  Frequent nighttime urination: Yes  Difficulty emptying bladder: No  Swollen joints: No  Joint pain: Yes  Bone pain: No  Muscle cramps: Yes  Muscle weakness: Yes  Joint stiffness: Yes  Bone fracture: No  Anemia: Yes  Trouble with coordination: Yes  Difficulty walking: Yes  Paralysis: No  Numbness: Yes  Trouble thinking or concentrating: Yes  Mood changes: No  Panic attacks: Yes        Physical Exam:     Vitals:    08/23/22 1038   BP: 124/80   BP Location: Right arm   Patient Position: Chair   Cuff Size: Adult Regular   Pulse: 94   SpO2: 99%       General Appearance: No distress, seated comfortably  Mood: Euthymic  HE ENT: Non constricted pupils  Respiratory: Non labored breathing  GI: Soft, non distended, no TTP  Skin: No rashes over exposed skin  MS: reduced muscle mass in the LE  Neuro: moves UE against gravity.   Gait: wheel chair bound.       Laboratory results:  Recent Labs   Lab Test 07/05/22  1023 06/28/22  0839 05/07/22  1055   NA  --  132* 142   POTASSIUM  --  3.5 3.9   CHLORIDE  --  100 101   CO2  --  29 30   ANIONGAP  --  3 11   GLC  --  74 91   BUN  --  41* 19   CR 3.9* 4.47* 4.43*   JANENE  --  11.5* 11.3*       CBC RESULTS:   Recent Labs   Lab Test 06/28/22  0839   WBC 6.5   RBC 4.17*   HGB 11.8*   HCT 37.2*   MCV 89   MCH 28.3   MCHC 31.7   RDW 14.5            Imaging:       ASSESSMENT AND PLAN:     Encounter Diagnosis:    Chronic pain syndrome  Liver cirrhosis  Necrotizing pancreatitis  ADHD   Depression  Awaiting possible kidney transplant evaluation.     Dax Villareal is a 31 year old y.o. old male  who presents to the pain clinic with chronic pain in the abdomen and feet.     RECOMMENDATIONS:     1. Medications: The patient is on belbuca from the rehab center. His is trying to get medical marijuana approved for pain. He does not like the belbuca and the limitations with belbuca. The patient was also prescribed pregabalin by the pcp Dr. Gomez. He has not picked up with prescriptions.     2. Procedure: None recommended at this time.     3. Physical therapy: Referral to the chronic pain program at [facility] [place].     Follow up: as needed.

## 2022-08-25 ENCOUNTER — TELEPHONE (OUTPATIENT)
Dept: FAMILY MEDICINE | Facility: CLINIC | Age: 32
End: 2022-08-25

## 2022-08-25 NOTE — TELEPHONE ENCOUNTER
General Call    Contacts       Type Contact Phone/Fax    08/25/2022 04:19 PM CDT Phone (Incoming) Dax Villareal (Self) [phone] (H)        Reason for Call:  Pt is calling, states he missed the call or appt with  for care coordination    What are your questions or concerns:  Pt missed appt, would like to speak to SW     Date of last appointment with provider:     Call  to schedule    Lorena Yu-  Efrem Krishna

## 2022-08-30 NOTE — PROGRESS NOTES
Clinic Care Coordination Contact  [facility]/Voicemail    Clinical Data: Care Coordination Outreach - attempt to reschedule assessment from NO SHOW 8/17/22.  Outreach attempted x 1.  Left message on patient's voicemail with call back information and requested return call.  Plan: CHW sent CCC Introduction letter via eMeter. CHW will do no further outreaches at this time.    Monica BOLANOS  Community Health Worker  [facility] Care Coordination  [facility]  [email or web address]   Office: [phone]

## 2022-08-31 ENCOUNTER — OFFICE VISIT (OUTPATIENT)
Dept: FAMILY MEDICINE | Facility: CLINIC | Age: 32
End: 2022-08-31
Payer: COMMERCIAL

## 2022-08-31 VITALS
SYSTOLIC BLOOD PRESSURE: 113 MMHG | RESPIRATION RATE: 28 BRPM | OXYGEN SATURATION: 99 % | HEART RATE: 106 BPM | WEIGHT: 144 LBS | DIASTOLIC BLOOD PRESSURE: 69 MMHG | BODY MASS INDEX: 22.55 KG/M2 | TEMPERATURE: 98.3 F

## 2022-08-31 DIAGNOSIS — M21.6X1 ACQUIRED BILATERAL PES CAVUS: ICD-10-CM

## 2022-08-31 DIAGNOSIS — M24.576 CONTRACTURE OF JOINT OF FOOT, UNSPECIFIED LATERALITY: Primary | ICD-10-CM

## 2022-08-31 DIAGNOSIS — Z99.2 ESRD (END STAGE RENAL DISEASE) ON DIALYSIS (H): ICD-10-CM

## 2022-08-31 DIAGNOSIS — K70.30 ALCOHOLIC CIRRHOSIS OF LIVER WITHOUT ASCITES (H): ICD-10-CM

## 2022-08-31 DIAGNOSIS — G25.81 RESTLESS LEGS SYNDROME: ICD-10-CM

## 2022-08-31 DIAGNOSIS — M21.6X2 ACQUIRED BILATERAL PES CAVUS: ICD-10-CM

## 2022-08-31 DIAGNOSIS — R29.91 ABNORMAL FINDING OF FOOT: ICD-10-CM

## 2022-08-31 DIAGNOSIS — N18.6 ESRD (END STAGE RENAL DISEASE) ON DIALYSIS (H): ICD-10-CM

## 2022-08-31 PROCEDURE — 99215 OFFICE O/P EST HI 40 MIN: CPT | Performed by: FAMILY MEDICINE

## 2022-08-31 ASSESSMENT — PATIENT HEALTH QUESTIONNAIRE - PHQ9: SUM OF ALL RESPONSES TO PHQ QUESTIONS 1-9: 13

## 2022-08-31 ASSESSMENT — PAIN SCALES - GENERAL: PAINLEVEL: NO PAIN (0)

## 2022-08-31 NOTE — PROGRESS NOTES
Assessment & Plan      Diagnosis Comments   1. Contracture of joint of foot, unspecified laterality  Wheelchair Scooter Order for DME - ONLY FOR DME    2. Acquired bilateral pes cavus  Occupational Therapy Referral    3. Abnormal finding of foot  Wheelchair Scooter Order for DME - ONLY FOR DME, Occupational Therapy Referral    4. Restless legs syndrome  Wheelchair Scooter Order for DME - ONLY FOR DME   5. ESRD (end stage renal disease) on dialysis (H)  Wheelchair Scooter Order for DME - ONLY FOR DME    6. Alcoholic cirrhosis of liver without ascites (H)       This is a face-to-face visit, for evaluation of a wheelchair/ scooter.  Patient does have chronic, complex medical history, with chronic pain, secondary to chronic feet neuropathy, history of bilateral Pes Cavus, has seen podiatry.    History of restless leg syndrome.    History of difficulty walking and ambulating.  History of end stage Kidney disease, on hemodialysis.  History of liver cirrhosis.  Currently, he does use a walker, for limited use inside the house.  It is difficult to use a walker since he has a hard time walking and ambulating secondary to his feet neuropathy, and contracted with his toes.    In addition he does have an old scooter, which was donated to him in which he utilized.  However, the battery is dying, it does not last long enough, where he is able to use and utilize.    Patient does qualify, and he need for new scooter, to allow him to follow-up with all his medical, doctors appointments, and help him with his activities of daily living.    Currently, he is living independently in his own apartment, he does get the PCA.    He has been seen and evaluated by pain clinic.  He has been referred to [facility], to get more help with his chronic pain.    I have filled a disability, handicap form for him.    50 minutes spent reviewing chart, reviewing test results, talking with and examining patient, formulating plan, and documentation on  the day of the encounter.      Depression Screening Follow Up    PHQ 8/31/2022   PHQ-9 Total Score 13   Q9: Thoughts of better off dead/self-harm past 2 weeks Not at all     Last PHQ-9 8/31/2022   1.  Little interest or pleasure in doing things 2   2.  Feeling down, depressed, or hopeless 0   3.  Trouble falling or staying asleep, or sleeping too much 3   4.  Feeling tired or having little energy 2   5.  Poor appetite or overeating 2   6.  Feeling bad about yourself 0   7.  Trouble concentrating 2   8.  Moving slowly or restless 2   Q9: Thoughts of better off dead/self-harm past 2 weeks 0   PHQ-9 Total Score 13   Difficulty at work, home, or with people Somewhat difficult       Follow Up Actions Taken  Depression Action Plan reviewed with patient.     Work on weight loss  Regular exercise    Return in about 6 months (around 2/28/2023) for Follow up, in person.    Kole Gomez MD  [facility]    David Verduzco is a 31 year ol, presenting for the following health issues:  Forms (DME, wheelchair, disability parking)      History of Present Illness       Reason for visit:  List noted by phone before visit.    He eats 0-1 servings of fruits and vegetables daily.He consumes 4 sweetened beverage(s) daily.He exercises with enough effort to increase his heart rate 30 to 60 minutes per day.  He exercises with enough effort to increase his heart rate 7 days per week.   He is taking medications regularly.       Review of Systems   Constitutional, HEENT, cardiovascular, pulmonary, GI, , musculoskeletal, neuro, skin, endocrine and psych systems are negative, except as otherwise noted.      Objective    There were no vitals taken for this visit.  There is no height or weight on file to calculate BMI.  Physical Exam   GENERAL: healthy, alert and no distress, on wheelchair.  PSYCH: mentation appears normal, affect normal/bright        Kole Gomez MD            .  ..

## 2022-09-01 DIAGNOSIS — M21.6X2 ACQUIRED BILATERAL PES CAVUS: ICD-10-CM

## 2022-09-01 DIAGNOSIS — M24.576 CONTRACTURE OF JOINT OF FOOT, UNSPECIFIED LATERALITY: Primary | ICD-10-CM

## 2022-09-01 DIAGNOSIS — M21.6X1 ACQUIRED BILATERAL PES CAVUS: ICD-10-CM

## 2022-09-07 ENCOUNTER — TELEPHONE (OUTPATIENT)
Dept: FAMILY MEDICINE | Facility: CLINIC | Age: 32
End: 2022-09-07

## 2022-09-07 DIAGNOSIS — F90.9 ATTENTION DEFICIT HYPERACTIVITY DISORDER (ADHD), UNSPECIFIED ADHD TYPE: ICD-10-CM

## 2022-09-07 NOTE — TELEPHONE ENCOUNTER
Reason for call:  Medication   If this is a refill request, has the caller requested the refill from the pharmacy already? Yes  Will the patient be using a [facility]? No  Name of the pharmacy and phone number for the current request:      [id number] - [place], [place] - [address] AT [facility]    Name of the medication requested: amphetamine-dextroamphetamine (ADDERALL XR) 30 MG 24 hr capsule & amphetamine-dextroamphetamine (ADDERALL) 20 MG tablet    Other request: Can the refill be sent over, patient is not out of medication. Just being proactive.     Phone number to reach patient:  Cell number on file:    Telephone Information:   Mobile [phone]   Best Time:      Can we leave a detailed message on this number?  YES    Travel screening: Not Applicable

## 2022-09-08 ENCOUNTER — TELEPHONE (OUTPATIENT)
Dept: FAMILY MEDICINE | Facility: CLINIC | Age: 32
End: 2022-09-08

## 2022-09-08 RX ORDER — DEXTROAMPHETAMINE SACCHARATE, AMPHETAMINE ASPARTATE, DEXTROAMPHETAMINE SULFATE AND AMPHETAMINE SULFATE 5; 5; 5; 5 MG/1; MG/1; MG/1; MG/1
TABLET ORAL
Qty: 60 TABLET | Refills: 0 | Status: SHIPPED | OUTPATIENT
Start: 2022-09-08 | End: 2022-10-06

## 2022-09-08 RX ORDER — DEXTROAMPHETAMINE SACCHARATE, AMPHETAMINE ASPARTATE MONOHYDRATE, DEXTROAMPHETAMINE SULFATE AND AMPHETAMINE SULFATE 7.5; 7.5; 7.5; 7.5 MG/1; MG/1; MG/1; MG/1
30 CAPSULE, EXTENDED RELEASE ORAL DAILY
Qty: 30 CAPSULE | Refills: 0 | Status: SHIPPED | OUTPATIENT
Start: 2022-09-08 | End: 2022-10-06

## 2022-09-08 RX ORDER — DEXTROAMPHETAMINE SACCHARATE, AMPHETAMINE ASPARTATE MONOHYDRATE, DEXTROAMPHETAMINE SULFATE AND AMPHETAMINE SULFATE 7.5; 7.5; 7.5; 7.5 MG/1; MG/1; MG/1; MG/1
30 CAPSULE, EXTENDED RELEASE ORAL 2 TIMES DAILY
Qty: 60 CAPSULE | Refills: 0 | Status: SHIPPED | OUTPATIENT
Start: 2022-09-08 | End: 2022-09-08

## 2022-09-08 NOTE — TELEPHONE ENCOUNTER
RN called and spoke with patient.    RN advised new script had kristen sent.    Phil Daniel RN, BSN, PHN  [facility]

## 2022-09-08 NOTE — TELEPHONE ENCOUNTER
Patients insurance will not cover the amphetamine-dextroamphetamine (ADDERALL XR) 30 MG 24 hr capsule -- because it says take Take 1 capsule (30 mg) by mouth 2 times daily , last month we wrote the Rx for take 1 capsule by mouth one time daily.  When the script is written like that the insurance company will cover it.    Mariely Evans [facility]

## 2022-09-08 NOTE — TELEPHONE ENCOUNTER
Prior Authorization Retail Medication Request    Medication/Dose: amphetamine-dextroamphetamine (ADDERALL XR) 30 MG 24 hr capsule  ICD code (if different than what is on RX):  na  Previously Tried and Failed:  na  Rationale:  na    Insurance Name:  na  Insurance ID:  key: [id number]      Pharmacy Information (if different than what is on RX)  [facility]  Phone:  [facility]    Pharmacy claim for amphetamine-dextroamphetamine (ADDERALL XR) 30 MG 24 hr capsule has been rejected and requires prior authorization.  To submit the PA, please follow the instructions below:    [email or web address]  KEY: [id number]

## 2022-09-08 NOTE — TELEPHONE ENCOUNTER
[facility] [place] not cover more than one Adderall XR per day no matter the strength.  Per [place] at [facility], patient will need to take a combination of ONE adderall XR and then adderall IR.

## 2022-09-12 ENCOUNTER — TELEPHONE (OUTPATIENT)
Dept: FAMILY MEDICINE | Facility: CLINIC | Age: 32
End: 2022-09-12

## 2022-09-12 DIAGNOSIS — M21.6X2 ACQUIRED BILATERAL PES CAVUS: ICD-10-CM

## 2022-09-12 DIAGNOSIS — M21.6X1 ACQUIRED BILATERAL PES CAVUS: ICD-10-CM

## 2022-09-12 DIAGNOSIS — M24.576 CONTRACTURE OF JOINT OF FOOT, UNSPECIFIED LATERALITY: Primary | ICD-10-CM

## 2022-09-12 NOTE — TELEPHONE ENCOUNTER
Pt calling. States the day before the appointment with Dr. Muñoz he received a call stating that Dr. Muñoz doesn't want to see him. States he is trying to get on medical marijuana. Transplant team has suggested edibles for chronic pain, sleep, anxiety, etc. Pt is requesting a call from PCP's care team to discuss. Pt can be reached at [phone].    Uzma Shannon RN  [facility]

## 2022-09-12 NOTE — TELEPHONE ENCOUNTER
"Returned call to patient.    RN had already explained in a previous note that \"referral from pain management\" was not found.  He says he his transplant team should have sent that referral.  Explained that he should be managed by pain clinic.  HE said he was referred to [facility] but never called them.  Advised he do so as he needs pain management care from them, pain clinic should also be able to certify him for med marijuana.  Dr Muñoz is not certifying him.     Patient is needing updated orders for Wheel chair in order for it to go through insurance.  RN called [facility], they states the order was corrected and they will reach out to him to schedule.      He has many different things going on and seems to be mixing information up.  He is sporadic on the phone given many pieces of information but nothing that makes a clear connection.  He would be best served to have care coordination or  to help him organize his appointment for him .        Brianna Bradley RN  "

## 2022-09-13 ENCOUNTER — TELEPHONE (OUTPATIENT)
Dept: FAMILY MEDICINE | Facility: CLINIC | Age: 32
End: 2022-09-13

## 2022-09-13 NOTE — TELEPHONE ENCOUNTER
Patient calling regarding referral for medical marijuana again    He reports that his appointment with ortho was cancelled today.    Looking into past appointments:    Dax has an appointment with [facility] on 9/09/22 . Please arrive by 10:45 AM.  He was a NO SHOW for this appointment.    He also CANCELLED his appointment for pain management for 9/19 with the reason of sooner appointment- he reports he was able to see them in [facility]    Patient has been all over asking repeatedly about appointments and referrals.  This all seems very overwhelming for his to manage on his own.    Clarified all future appointments with him   Was very clear that no referral for medical marijuana will be done (needs to be managed by chronic pain clinic sister el[facility])    He needs to call for SISTER ELISSA for Chronic pain referral AND ORTHOPEDICS    He will call these two tomorrow for appointment      Brianna Bradley RN

## 2022-09-19 ENCOUNTER — TELEPHONE (OUTPATIENT)
Dept: FAMILY MEDICINE | Facility: CLINIC | Age: 32
End: 2022-09-19

## 2022-09-19 DIAGNOSIS — M24.576 CONTRACTURE OF JOINT OF FOOT, UNSPECIFIED LATERALITY: ICD-10-CM

## 2022-09-19 DIAGNOSIS — M21.6X2 ACQUIRED BILATERAL PES CAVUS: ICD-10-CM

## 2022-09-19 DIAGNOSIS — G89.28 OTHER CHRONIC POSTPROCEDURAL PAIN: Primary | ICD-10-CM

## 2022-09-19 DIAGNOSIS — N17.0 ACUTE KIDNEY FAILURE WITH TUBULAR NECROSIS (H): ICD-10-CM

## 2022-09-19 DIAGNOSIS — M21.6X1 ACQUIRED BILATERAL PES CAVUS: ICD-10-CM

## 2022-09-19 NOTE — TELEPHONE ENCOUNTER
Order/Referral Request    Who is requesting: Patient    Orders being requested: [facility]   Reason service is needed/diagnosis: Pain Management  When are orders needed by: ASAP    Has this been discussed with Provider: Yes    Does patient have a preference on a Group/Provider/Facility? [facility]    Does patient have an appointment scheduled?:  No, he was trying to schedule,and they told the patient they did not have a referral.  He was told to call and get one.    Where to send orders: Unknown    Could we send this information to you in [facility] or would you prefer to receive a phone call?:   Patient would prefer a phone call   Okay to leave a detailed message?: Yes at Home number on file [phone] (home)

## 2022-09-19 NOTE — TELEPHONE ENCOUNTER
Medication Question or Refill    Contacts       Type Contact Phone/Fax    09/19/2022 04:19 PM CDT Phone (Incoming) Dax Villareal (Self) [phone] (H)          What medication are you calling about (include dose and sig)?:   Midodrine 5 mg  Controlled Substance Agreement on file:   CSA -- Patient Level:    CSA: None found at the patient level.       Who prescribed the medication?: Taha     Do you need a refill? Yes: Patient is requesting refills to be sent to his new pharmacy, he is trying to set up with the pharmacy home delivery    [id number]  [facility] [place] [address] AT [place] [address]      When did you use the medication last? Unknown    Patient offered an appointment? No    Do you have any questions or concerns?  No    Preferred Pharmacy:    [id number]  [place], [place] [address] AT [address] [address]  Phone: [phone] Fax: [phone]      Could we send this information to you in [facility] [place] or would you prefer to receive a phone call?:   Patient would prefer a phone call   Okay to leave a detailed message?: Yes at Home number on file [phone] (home)

## 2022-09-20 NOTE — TELEPHONE ENCOUNTER
I have put a new referral  But, this was made on 08/23/2022, by yi Medeiros, after he saw her.    thanks

## 2022-09-20 NOTE — TELEPHONE ENCOUNTER
Routing to PCP      I cannot find an actual referral for him to [facility], can you please send referral for him??    -Multiple calls regarding referrals throughout his chart, RN trying to assist pt with organization of referrals.        Brianna Bradley RN

## 2022-09-20 NOTE — TELEPHONE ENCOUNTER
Called pharmacy where medication was initially sent to - pharmacist reports it was transferred out to [facility] [phone]      Pt just needs to call them to set up delivery, no new prescription is needed as he should have plenty of refills.    midodrine (PROAMATINE) 5 MG tablet 270 tablet 3 8/3/2022  No   Sig - Route: 2 tablets (10 mg) by Per Feeding Tube route 3 times daily - Per Feeding Tube         Brianna Bradley, RN

## 2022-09-20 NOTE — TELEPHONE ENCOUNTER
Let him know referral was placed    He asked about legal case regarding nursing home and his bilat foot injury.  Let him know I cannot advise regarding any legal cases.    He also asked if I could draft up all of his appointment for him and he can go through and approve or change as needed.  Advised that I am a triage nurse and he would need to go through is  or PCA or somebody else for that.  He reports he can do this himself.      Brianna Bradley RN

## 2022-09-26 DIAGNOSIS — G62.9 PERIPHERAL POLYNEUROPATHY: ICD-10-CM

## 2022-09-26 RX ORDER — PREGABALIN 150 MG/1
CAPSULE ORAL
Qty: 180 CAPSULE | Refills: 1 | Status: SHIPPED | OUTPATIENT
Start: 2022-09-26 | End: 2022-09-28

## 2022-09-26 NOTE — TELEPHONE ENCOUNTER
"Patient was last seen by pain management 8/23. He was told to follow up with PCP/Dr. Muñoz for medical cannabis.     Dr. Muñoz is not accepting new patients for medical cannabis. Patient requested a referral for [facility]. They do not do medical cannibis either.     RN recommended that patient Torres Martinez back to pain management through [facility] for further discussion and pain plan. He asks if Dr. Gomez would be willing to \"prescribe something for the short term\" to help him get through his pain. Currently, he states that he is having more bad days than good, and that it is too difficult to go to appointments due to pain.     RN advised that visit would likely be necessary, patient again asks if request can be sent to PCP for consideration.     Routed to PCP to review and advise.     Donna Metz, RN, BSN, PHN  [facility]: [place]      "

## 2022-09-27 NOTE — TELEPHONE ENCOUNTER
RN left  for patient at [phone] requesting call back to clinic.    Phil Daniel RN, BSN, PHN  [facility]

## 2022-09-28 ENCOUNTER — THERAPY VISIT (OUTPATIENT)
Dept: PHYSICAL THERAPY | Facility: CLINIC | Age: 32
End: 2022-09-28
Payer: COMMERCIAL

## 2022-09-28 DIAGNOSIS — M24.576 CONTRACTURE OF JOINT OF FOOT, UNSPECIFIED LATERALITY: ICD-10-CM

## 2022-09-28 DIAGNOSIS — M21.6X1 ACQUIRED BILATERAL PES CAVUS: ICD-10-CM

## 2022-09-28 DIAGNOSIS — M21.6X2 ACQUIRED BILATERAL PES CAVUS: ICD-10-CM

## 2022-09-28 PROCEDURE — 97162 PT EVAL MOD COMPLEX 30 MIN: CPT | Mod: GP | Performed by: PHYSICAL THERAPIST

## 2022-09-28 PROCEDURE — 97110 THERAPEUTIC EXERCISES: CPT | Mod: GP | Performed by: PHYSICAL THERAPIST

## 2022-09-28 RX ORDER — PREGABALIN 150 MG/1
CAPSULE ORAL
Qty: 180 CAPSULE | Refills: 3 | Status: SHIPPED | OUTPATIENT
Start: 2022-09-28 | End: 2022-10-06

## 2022-09-28 NOTE — PROGRESS NOTES
Physical Therapy Initial Evaluation  Subjective:  The history is provided by the patient. No  was used.   Therapist Generated HPI Evaluation  Problem details: Pt presents to therapy today with referral for acquired bilateral pes cavus and contracture of foot musculature. Pt has extensive PMH including chronic pain, chronic peripheral polyneuropathy, end stage renal disease which he is on hemodialysis for, history of liver cirrhosis and general deconditioning. Pt has seen podiatry for his feet in the past. Pt utilized a wheeled walker for shorter distances and ambulation in his home, also utilizes a scooter but the battery began to die so has not utilized that as much recently. Last MD appointment went over paperwork to get him a new scooter to utilize. Pt reports chronic ankle and foot pain but the pain intensified after being immobilized for about 7 months last year. Has been working on some stretching at home for his feet and ankles and that does feel better for a few minutes before the tightness returns. Patient is currently limited to standing and walking for about 3-5 minutes before he needs to sit. Goals are to improve his mobility as much as he can and improve pain levels with standing and walking.  .         Type of problem:  Bilateral ankles and bilateral feet.    This is a chronic condition.  Condition occurred with:  Insidious onset (long term immobilization).    Patient reports pain:  Anterior, great toe, joint, longitudinal arch, lateral, metatarsal heads and toes.  Pain is described as aching, cramping and sharp and is constant.  Pain is worse during the day.  Since onset symptoms are gradually worsening.  Associated symptoms:  Loss of motion/stiffness. Symptoms are exacerbated by activity, bending/squatting, certain positions, standing, walking and weight bearing  and relieved by rest and heat.      Restrictions due to condition include:  Currently not working due to present  treatment.  Barriers include:  None as reported by patient.    Patient Health History  Dax Villareal being seen for bilateral pes cavus .     Date of Onset: hospital late 2021.   Problem occurred: prolonged immobilization    Pain is reported as 9/10 on pain scale.  General health as reported by patient is poor.  Pertinent medical history includes: heart problems, history of fractures, incontinence, kidney disease, mental illness, migraines/headaches, numbness/tingling, sleep disorder/apnea and smoking. Other medical history details: pancreatitis, severe malnutrition, repetitive ankle issues, fractured ankle.   Red flags:  Changes in bowel and bladder habits, change in skin color, pain at rest/night, severe dizziness, severe headaches and significant weakness.     Surgeries include:  Heart surgery, orthopedic surgery and other.        Current occupation is retired.   Primary job tasks include:  Computer work and lifting/carrying.                                    Objective:  System Pt utilizing electric scooter for mobility, noticeable Pes cavus deformity bilaterally    Physical Exam  Left ankle AROM: dorsiflexion -12, plantarflexion WNL, Inversion 33, eversion 15  Right ankle AROM: dorsiflexion -30, plantarflexion WNL, inversion 30, eversion 23  Left ankle strength 4/5 all planes  Right ankle strength 4/5 all planes  Minimal AROM present at 1st MTP and toes bilaterally due to contractures.    General     ROS    Assessment/Plan:    Patient is a 31 year old male with both sides ankle and foot complaints.    Patient has the following significant findings with corresponding treatment plan.                Diagnosis 1:  Acquired bilateral pes cavus  Pain -  hot/cold therapy, manual therapy, self management, education and home program  Decreased ROM/flexibility - manual therapy and therapeutic exercise  Decreased joint mobility - manual therapy and therapeutic exercise  Decreased strength - therapeutic exercise and  therapeutic activities  Impaired balance - neuro re-education and therapeutic activities  Decreased proprioception - neuro re-education and therapeutic activities  Impaired gait - gait training  Impaired muscle performance - neuro re-education  Decreased function - therapeutic activities    Therapy Evaluation Codes:   1) History comprised of:   Personal factors that impact the plan of care:      Time since onset of symptoms.    Comorbidity factors that impact the plan of care are:      Bowel/bladder changes, Dizziness, Heart problems, Mental illness, Migraines/headaches, Numbness/tingling, Pain at night/rest, Sleep disorder/apnea, Smoking and Weakness .     Medications impacting care: None.  2) Examination of Body Systems comprised of:   Body structures and functions that impact the plan of care:      Ankle/foot bilateral   Activity limitations that impact the plan of care are:      Bending, Cooking, Driving, Dressing, Stairs, Standing and Walking.  3) Clinical presentation characteristics are:   Evolving/Changing.  4) Decision-Making    Moderate complexity using standardized patient assessment instrument and/or measureable assessment of functional outcome.  Cumulative Therapy Evaluation is: Moderate complexity.    Previous and current functional limitations:  (See Goal Flow Sheet for this information)    Short term and Long term goals: (See Goal Flow Sheet for this information)     Communication ability:  Patient appears to be able to clearly communicate and understand verbal and written communication and follow directions correctly.  Treatment Explanation - The following has been discussed with the patient:   RX ordered/plan of care  Anticipated outcomes  Possible risks and side effects  This patient would benefit from PT intervention to resume normal activities.   Rehab potential is fair.    Frequency:  1 X week, once daily  Duration:  for 8 weeks  Discharge Plan:  Achieve all LTG.  Independent in home treatment  program.  Reach maximal therapeutic benefit.    Please refer to the daily flowsheet for treatment today, total treatment time and time spent performing 1:1 timed codes.

## 2022-09-28 NOTE — PROGRESS NOTES
[facility]    OUTPATIENT Physical Therapy ORTHOPEDIC EVALUATION  PLAN OF TREATMENT FOR OUTPATIENT REHABILITATION  (COMPLETE FOR INITIAL CLAIMS ONLY)  Patient's Last Name, First Name, M.I.  YOB: 1990  Dax Villareal    Provider s Name:  [facility]   Medical Record No.  [id number]   Start of Care Date:  09/28/22   Onset Date:    Referral date 9/20/22   Type:     _X__PT   ___OT Medical Diagnosis:    Encounter Diagnoses   Name Primary?    Acquired bilateral pes cavus     Contracture of joint of foot, unspecified laterality         Treatment Diagnosis:  acquired bilateral pes cavus        Goals:     09/28/22 0500   Body Part   Goals listed below are for bilateral feet   Goal #1   Goal #1 ambulation   Previous Functional Level Minutes patient could walk   Performance Level 10   Current Functional Level Minutes patient can walk   Performance Level 5   STG Target Performance Minutes patient will be able to walk   Performance Level 10 minutes   Rationale for safe household ambulation;for safe outdoor household ambulation;for safe community ambulation   Due Date 10/19/22    LTG Target Performance Minutes patient will be able to  walk   Performance Level 15   Rationale for safe household ambulation;for safe outdoor household ambulation;for safe community ambulation   Due Date 11/23/22   Goal #2   Goal #2 standing   Previous Functional Level Minutes patient could stand   Performance level 15 minutes   Current Functional Level Minutes patient can stand   Performance level 5   STG Target Performance Minutes patient will be able to stand   Rationale for housekeeping tasks such as vacuuming, bed making, mowing, gardening;for personal hygiene;for meal preparation;for safe household ambulation   Due date 10/19/22   LTG Target Performance Minutes patient will be able to stand    Performance Level 10   Rationale for housekeeping tasks such as vacuuming, bed making, mowing;for personal hygiene;for meal preparation;for safe household ambulation   Due date 11/23/22       Therapy Frequency:  1x/week  Predicted Duration of Therapy Intervention:  8-10 weeks    Octavio Painter, PT                 I CERTIFY THE NEED FOR THESE SERVICES FURNISHED UNDER        THIS PLAN OF TREATMENT AND WHILE UNDER MY CARE     (Physician attestation of this document indicates review and certification of the therapy plan).                     Certification Date From:  09/28/22   Certification Date To:  11/23/22    Referring Provider:  Kole Gomez    Initial Assessment        See Epic Evaluation SOC Date: 09/28/22

## 2022-09-28 NOTE — TELEPHONE ENCOUNTER
Routing back to PCP.       Please review sig of Lyrica that was sent on 9/26.  Per notes below, sig should be take 150 mg BID. New RX with correct sig pending if agreeable.     Then please route back for RN to call pharmacy to discard RX from 9/26.     Patient verbalized understanding of below message from Dr. Gomez and verbalized understanding.     Kennedy Russell RN

## 2022-10-05 ENCOUNTER — TELEPHONE (OUTPATIENT)
Dept: FAMILY MEDICINE | Facility: CLINIC | Age: 32
End: 2022-10-05

## 2022-10-05 ENCOUNTER — THERAPY VISIT (OUTPATIENT)
Dept: PHYSICAL THERAPY | Facility: CLINIC | Age: 32
End: 2022-10-05
Payer: COMMERCIAL

## 2022-10-05 DIAGNOSIS — M21.6X2 ACQUIRED BILATERAL PES CAVUS: Primary | ICD-10-CM

## 2022-10-05 DIAGNOSIS — M21.6X1 ACQUIRED BILATERAL PES CAVUS: Primary | ICD-10-CM

## 2022-10-05 PROCEDURE — 97140 MANUAL THERAPY 1/> REGIONS: CPT | Mod: GP | Performed by: PHYSICAL THERAPIST

## 2022-10-05 PROCEDURE — 97110 THERAPEUTIC EXERCISES: CPT | Mod: GP | Performed by: PHYSICAL THERAPIST

## 2022-10-06 ENCOUNTER — VIRTUAL VISIT (OUTPATIENT)
Dept: GASTROENTEROLOGY | Facility: CLINIC | Age: 32
End: 2022-10-06
Attending: NURSE PRACTITIONER
Payer: COMMERCIAL

## 2022-10-06 ENCOUNTER — TELEPHONE (OUTPATIENT)
Dept: FAMILY MEDICINE | Facility: CLINIC | Age: 32
End: 2022-10-06

## 2022-10-06 VITALS — WEIGHT: 148.81 LBS | BODY MASS INDEX: 23.31 KG/M2

## 2022-10-06 DIAGNOSIS — F90.9 ATTENTION DEFICIT HYPERACTIVITY DISORDER (ADHD), UNSPECIFIED ADHD TYPE: ICD-10-CM

## 2022-10-06 DIAGNOSIS — K85.91 NECROTIZING PANCREATITIS: ICD-10-CM

## 2022-10-06 DIAGNOSIS — G62.9 PERIPHERAL POLYNEUROPATHY: Primary | ICD-10-CM

## 2022-10-06 DIAGNOSIS — M24.576 CONTRACTURE OF JOINT OF FOOT, UNSPECIFIED LATERALITY: ICD-10-CM

## 2022-10-06 DIAGNOSIS — Z76.82 ORGAN TRANSPLANT CANDIDATE: ICD-10-CM

## 2022-10-06 DIAGNOSIS — Z01.818 PRE-TRANSPLANT EVALUATION FOR KIDNEY TRANSPLANT: Primary | ICD-10-CM

## 2022-10-06 DIAGNOSIS — K76.89 LIVER DYSFUNCTION: ICD-10-CM

## 2022-10-06 DIAGNOSIS — G89.28 OTHER CHRONIC POSTPROCEDURAL PAIN: ICD-10-CM

## 2022-10-06 DIAGNOSIS — N18.6 END STAGE RENAL DISEASE (H): ICD-10-CM

## 2022-10-06 PROCEDURE — 99213 OFFICE O/P EST LOW 20 MIN: CPT | Mod: 95 | Performed by: INTERNAL MEDICINE

## 2022-10-06 RX ORDER — DEXTROAMPHETAMINE SACCHARATE, AMPHETAMINE ASPARTATE, DEXTROAMPHETAMINE SULFATE AND AMPHETAMINE SULFATE 5; 5; 5; 5 MG/1; MG/1; MG/1; MG/1
TABLET ORAL
Qty: 60 TABLET | Refills: 0 | Status: CANCELLED | OUTPATIENT
Start: 2022-10-06

## 2022-10-06 RX ORDER — DEXTROAMPHETAMINE SACCHARATE, AMPHETAMINE ASPARTATE, DEXTROAMPHETAMINE SULFATE AND AMPHETAMINE SULFATE 5; 5; 5; 5 MG/1; MG/1; MG/1; MG/1
TABLET ORAL
Qty: 60 TABLET | Refills: 0 | Status: SHIPPED | OUTPATIENT
Start: 2022-10-06 | End: 2022-11-03

## 2022-10-06 RX ORDER — PREGABALIN 300 MG/1
CAPSULE ORAL
Qty: 60 CAPSULE | Refills: 3 | Status: SHIPPED | OUTPATIENT
Start: 2022-10-06 | End: 2023-01-23

## 2022-10-06 RX ORDER — DEXTROAMPHETAMINE SACCHARATE, AMPHETAMINE ASPARTATE MONOHYDRATE, DEXTROAMPHETAMINE SULFATE AND AMPHETAMINE SULFATE 7.5; 7.5; 7.5; 7.5 MG/1; MG/1; MG/1; MG/1
30 CAPSULE, EXTENDED RELEASE ORAL DAILY
Qty: 30 CAPSULE | Refills: 0 | Status: SHIPPED | OUTPATIENT
Start: 2022-10-06 | End: 2022-11-03

## 2022-10-06 RX ORDER — DEXTROAMPHETAMINE SACCHARATE, AMPHETAMINE ASPARTATE MONOHYDRATE, DEXTROAMPHETAMINE SULFATE AND AMPHETAMINE SULFATE 7.5; 7.5; 7.5; 7.5 MG/1; MG/1; MG/1; MG/1
30 CAPSULE, EXTENDED RELEASE ORAL DAILY
Qty: 30 CAPSULE | Refills: 0 | Status: CANCELLED | OUTPATIENT
Start: 2022-10-06

## 2022-10-06 ASSESSMENT — PAIN SCALES - GENERAL: PAINLEVEL: SEVERE PAIN (7)

## 2022-10-06 NOTE — TELEPHONE ENCOUNTER
Received call from patient. The main concern that he is calling regarding is pain/pain management.     1. FYI- He saw GI today and he is going to do a study (nuclear gastric emptying studying and an UGISBFT) and his Protonix was increased to 40 mg once daily.    2. Pain. He has appt scheduled with PCP for 10/11. He is wondering if he can cancel this appointment. States that he only scheduled it as a back up. He feels like a broken record doing all of these appointments.   He is currently taking Lyrica and feels that it is not helping with his pain. States that he feels like it provides him no coverage for pain. There was one day that he had to cancel his dialysis appointment due to the pain.   -He is wondering if PCP could prescribe something like low dose of Lortab (Vicodin) for pain. States that he previously had liquid dilaudid (in hospital?) and did not like that.   -He would like medical marijuana for PTSD, pain, anxiety etc. States that his visit with Toni went no where. See notes from 07/21/22 virtual appt. He states that he feels like his pain is not well managed. He reports being referred several times and nothing coming of it. He mentioned being referred to [facility] long term pain clinic, but states that when he did receive a call from them it was to schedule for PT. He already has a PT that he is seeing.  States that he is used to the pain that occurs at PT, but outside of PT, there is a lingering burning/pain in his feet to the point that he has to be off of them.    He has been trying to find a pain specialist since being at the nursing home and it has gone no where.    3. Pt is frustrated. States that his health care is too much of an obstacle, he is talking and nothing goes anywhere. States that it seems like the healthcare system is rigged for failure. States that he has been trying to come up with remedies that he sees in hospice or sports injuries. He is learning to re-walk. States that his  friends see him like this and wonder why he is like this and he can't re-iterate this story to everyone over and over.    States that he needs to cancel Creon, he keeps getting refills that he does not need. Advised to call pharmacy regarding this.  Pt requesting refill of Adderall- started this in a refill encounter.    Mago Barnett RN   [facility]

## 2022-10-06 NOTE — PROGRESS NOTES
"    [facility]    The patient has been notified of following:     \"This phone visit will be conducted via a call between you and your physician/provider. We have found that certain health care needs can be provided without the need for an in-person physical exam.  This service lets us provide the care you need with a video conversation.  If a prescription is necessary we can send it directly to your pharmacy.  If lab work is needed we can place an order for that and you can then stop by our lab to have the test done at a later time.    Phone visits are billed at different rates depending on your insurance coverage.  Please reach out to your insurance provider with any questions.    If during the course of the call the physician/provider feels a video visit is not appropriate, you will not be charged for this service.\"    Patient has given verbal consent for phone visit? Yes  How would you like to obtain your AVS? My Chart  Will anyone else be joining your visit? No  {If patient encounters technical issues they should call [phone]     Visit Details    Type of service:  Phone Visit    Start Time: 1215  End Time: 1254    Originating Location (pt. Location): Dialysis    Distant Location (provider location):  [facility] [place]     Referring provider  Kei Swanson  Query     HPI  Mr Villareal is a 32yo gentleman with alcoholic cirrhosis, end stage renal diease and an episode necrotizing pancreatitis requiring endoscopic transluminal drainage and serial necrosectomies as well as a placement of a GJ tube which has since been removed. He had a persistent fistula that required subsequent endoscopy for closure. He now presents  To discuss issues with eating, in particular with his medications, since January of this year. He has motion sickness with moving before and after dialysis. He lost 90lbs with the pancreatic necrosis though " has not been able to gain the weight back. He continued to have issues with tolerating food since then. His feeding tubes were removed during a period of doing well however his symptoms of food intolerance returned. He is not however returning to discuss replacement of a feeding tube.    He believes he has issues with his medications, perhaps his midodrine as he has been passing out during dialysis. He also notes severe heart burn and struggles with chronic abdominal pain. The pain is described as sharp and at this sides, and this may result in vomiting, typically in the morning. He believes this is positional and with movement. He is noted to be wheel chair dependent.    Review of Systems   ROS COMP: Constitutional, HEENT, cardiovascular, pulmonary, GI, , musculoskeletal, neuro, skin, endocrine and psych systems are negative, except as otherwise noted.    Medications  Current Outpatient Medications   Medication     acetaminophen (TYLENOL) 325 MG/10.15ML solution     amphetamine-dextroamphetamine (ADDERALL XR) 30 MG 24 hr capsule     amphetamine-dextroamphetamine (ADDERALL) 20 MG tablet     amylase-lipase-protease (CREON 24) 36138-87769 units CPEP per EC capsule     Cetaphil Moisturizing (CETAPHIL) external lotion     DULoxetine (CYMBALTA) 60 MG capsule     folic acid (FOLVITE) 1 MG tablet     Guar Gum (FIBER MODULAR, NUTRISOURCE FIBER,) packet     hydrOXYzine (ATARAX) 25 MG tablet     lactulose (CHRONULAC) 10 GM/15ML solution     midodrine (PROAMATINE) 5 MG tablet     pantoprazole (PROTONIX) 20 MG EC tablet     potassium chloride (KLOR-CON) 20 MEQ packet     pregabalin (LYRICA) 150 MG capsule     prochlorperazine (COMPAZINE) 5 MG tablet     rOPINIRole (REQUIP) 0.25 MG tablet     sevelamer carbonate, RENVELA, 0.8 GM PACK Packet     silver nitrate (ARZOL) 75-25 % miscellaneous     sodium bicarbonate 325 MG tablet     thiamine (B-1) 100 MG tablet     tiZANidine (ZANAFLEX) 2 MG tablet     torsemide (DEMADEX) 20 MG  tablet     triamcinolone (KENALOG) 0.1 % external cream     vitamin (B COMPLEX) capsule     No current facility-administered medications for this visit.     Past Medical  Past Medical History:   Diagnosis Date     ADHD 2008     Alcohol abuse, in remission      Alcoholic cirrhosis of liver with ascites (H)      Anemia in chronic kidney disease      Chronic pain      Current smoker      End stage renal disease (H)      Hypotension, unspecified hypotension type      Malnutrition (H)      Metabolic acidosis      Necrotizing pancreatitis      Pancreatic insufficiency      Pericarditis      Recurrent pleural effusion      Past Surgical  Past Surgical History:   Procedure Laterality Date     COMBINED ESOPHAGOSCOPY, GASTROSCOPY, DUODENOSCOPY (EGD), TISSUE APPOSI N/A 9/20/2021    Procedure: ESOPHAGOGASTRODUODENOSCOPY WITH SUTURE FISTULA CLOSURE, argon plasma coagulation;  Surgeon: Jose Quigley MD;  [facility]     ENDOSCOPIC INSERTION TUBE GASTROSTOMY N/A 7/13/2021    Procedure: Upper endoscopy, INSERTION, PEG-J TUBE and Gastropexy;  Surgeon: Rayshawn Will MD;  [facility]     ENDOSCOPIC INSERTION TUBE GASTROSTOMY  9/13/2021    Procedure: PEG/J PLACEMENT.;  Surgeon: Rayshawn Will MD;  [facility]     ENDOSCOPIC ULTRASOUND UPPER GASTROINTESTINAL TRACT (GI) N/A 7/21/2021    Procedure: ENDOSCOPIC ULTRASOUND, ESOPHAGOSCOPY / UPPER GASTROINTESTINAL TRACT (GI) with cyst gastrostomy, dilation;  Surgeon: Guru Yecenia Chacko MD;  [facility]     ENDOSCOPIC ULTRASOUND, ESOPHAGOSCOPY, GASTROSCOPY, DUODENOSCOPY (EGD), NECROSECTOMY N/A 8/11/2021    Procedure: ESOPHAGOGASTRODUODENOSCOPY (EGD) with necrosectomy, stent exchange.;  Surgeon: Amadou Beasley MD;  [facility]     ESOPHAGOSCOPY, GASTROSCOPY, DUODENOSCOPY (EGD), COMBINED N/A 7/28/2021    Procedure: ESOPHAGOGASTRODUODENOSCOPY (EGD), gastrostomy apposition, Necrosectomy, stent exchange.;  Surgeon: Rayshawn Will  "MD;  [facility]     ESOPHAGOSCOPY, GASTROSCOPY, DUODENOSCOPY (EGD), COMBINED N/A 8/6/2021    Procedure: ESOPHAGOGASTRODUODENOSCOPY (EGD) with necrosectomy, and stents exchanged;  Surgeon: Jose Quigley MD;  [facility]     ESOPHAGOSCOPY, GASTROSCOPY, DUODENOSCOPY (EGD), COMBINED Left 9/13/2021    Procedure: ESOPHAGOGASTRODUODENOSCOPY (EGD), GASTROSTOMY TUBE REMOVAL, FISTULAR CLOSURE ENDOSCOPIC.;  Surgeon: Rayshawn Will MD;  [facility]     IR ABSCESS TUBE CHANGE  7/14/2021     IR PARACENTESIS  7/21/2021     IR SINOGRAM INJECTION DIAGNOSTIC  8/25/2021     ORTHOPEDIC SURGERY Right     fracture repair     REPLACE GASTROSTOMY TUBE, PERCUTANEOUS N/A 7/28/2021    Procedure: gastro-jejunostomy tube exchange;  Surgeon: Rayshawn Will MD;  [facility]     Social History  Social History     Socioeconomic History     Marital status: Single     Spouse name: None     Number of children: None     Years of education: None     Highest education level: None   Tobacco Use     Smoking status: Current Every Day Smoker     Packs/day: 0.25     Types: Cigarettes     Smokeless tobacco: Never Used     Tobacco comment: 8/24/2021 Patient did not want to discuss quitting but would like 4 mg lozenge to use during admission. Patient also accepted \"Quitting for Good\" workbook   Vaping Use     Vaping Use: Never used     Passive vaping exposure: Yes   Substance and Sexual Activity     Alcohol use: Not Currently     Comment: \"no alcohol for over 1 year ago\" per pt on 5/12/22     Drug use: No     Sexual activity: Not Currently     Partners: Female     Family History  Family History   Problem Relation Age of Onset     Attention Deficit Disorder Mother      Alcoholism Mother      Alcoholism Father      Alcoholism Brother      Alcoholism Sister      Objective:  Reported vitals:  There were no vitals taken for this visit.   GEN: Healthy, alert and no distress  PSYCH: Alert and oriented times 3; coherent speech, normal " rate and volume, able to articulate logical thoughts, able to abstract reason, no tangential thoughts, no hallucinations or delusions, affect seems normal  RESP: No cough, no audible wheezing, able to talk in full sentences  Remainder of exam unable to be completed due to virtual visit     Outside Imaging summaries:    Assessment and plan:  Mr Villareal is a 32yo gentleman with alcoholic cirrhosis, end stage renal diease and an episode necrotizing pancreatitis requiring endoscopic transluminal drainage. This was a very difficult visit to do over the phone as the patient had numerous complaints and has a very complex medical history. I therefore asked him to list his issues one by one for comment.    Issue:  1. Chronic pain, etiology unclear and possibly funcitonal: has been referred to a chronic pain specialist who recommended physical therapy (who will also help with his feet); I agree with this plan  2. Food intolerance: I suggested replacement of a feeding tube and he declined this recommendation; he believes he is doing better as is given his past experiences; it is possible that he has gastroparesis and or ongoing outlet obstruction; we will organize a nuclear gastric emptying studying and an UGISBFT, respectively  3. Reflux: he takes Protonix 20mg, will be increased to 40mg once daily  4. Right flank pain, thought to be related to his kidney; suggested he follow up with his nephrologist     Return clinic visit (in person or by video, NOT PHONE) next available    It was a pleasure to participate in the care of this patient; please contact us with any further questions.  A total of 45 minutes was spent in evaluation with this patient, >50% of which was counseling regarding the above delineated issues.      Rayshawn Will MD PhD FACG ALEM MUSTAFA  Associate Professor of Medicine, Surgery and Pediatrics  Interventional and Therapeutic Endoscopy  GI Service Line Medical Director    [facility]  [place]  Division of Gastroenterology and Hepatology  [facility] [address] [place], [place] [address]    New Consultations  [phone]  Procedure Scheduling [phone]  Clinical Nurse Coordinator [phone]  Clinical Fax   [phone]  Administrative   [phone]  Administrative Fax  [phone]

## 2022-10-06 NOTE — TELEPHONE ENCOUNTER
RN received call back from patient.    Patient waiting for call back from nurse.    RN reviewed providers message and advised increase in Lyrica.    Patient expressed frustration regarding the system and feels like he is getting no where.    Patient would like to keep appointment 10/11 to discus overall plan moving forward regarding his medical conditions.    Would also like to discuss needed DME.      RN assisted patient in rescheduling appointment earlier that day as patient has dialysis.    Phil Daniel RN, BSN, PHN  [facility]

## 2022-10-06 NOTE — LETTER
10/6/2022         RE: Dax Villareal  [address]  [place] [address]        Dear Colleague,    Thank you for referring your patient, Dax Villareal, to the [facility] [place]. Please see a copy of my visit note below.        [facility]     Referring provider  Kei Swanson  Query     HPI  Mr Villareal is a 30yo gentleman with alcoholic cirrhosis, end stage renal diease and an episode necrotizing pancreatitis requiring endoscopic transluminal drainage and serial necrosectomies as well as a placement of a GJ tube which has since been removed. He had a persistent fistula that required subsequent endoscopy for closure. He now presents  To discuss issues with eating, in particular with his medications, since January of this year. He has motion sickness with moving before and after dialysis. He lost 90lbs with the pancreatic necrosis though has not been able to gain the weight back. He continued to have issues with tolerating food since then. His feeding tubes were removed during a period of doing well however his symptoms of food intolerance returned. He is not however returning to discuss replacement of a feeding tube.    He believes he has issues with his medications, perhaps his midodrine as he has been passing out during dialysis. He also notes severe heart burn and struggles with chronic abdominal pain. The pain is described as sharp and at this sides, and this may result in vomiting, typically in the morning. He believes this is positional and with movement. He is noted to be wheel chair dependent.    Review of Systems   ROS COMP: Constitutional, HEENT, cardiovascular, pulmonary, GI, , musculoskeletal, neuro, skin, endocrine and psych systems are negative, except as otherwise noted.    Medications  Current Outpatient Medications   Medication     acetaminophen (TYLENOL) 325 MG/10.15ML solution      amphetamine-dextroamphetamine (ADDERALL XR) 30 MG 24 hr capsule     amphetamine-dextroamphetamine (ADDERALL) 20 MG tablet     amylase-lipase-protease (CREON 24) 97844-14789 units CPEP per EC capsule     Cetaphil Moisturizing (CETAPHIL) external lotion     DULoxetine (CYMBALTA) 60 MG capsule     folic acid (FOLVITE) 1 MG tablet     Guar Gum (FIBER MODULAR, NUTRISOURCE FIBER,) packet     hydrOXYzine (ATARAX) 25 MG tablet     lactulose (CHRONULAC) 10 GM/15ML solution     midodrine (PROAMATINE) 5 MG tablet     pantoprazole (PROTONIX) 20 MG EC tablet     potassium chloride (KLOR-CON) 20 MEQ packet     pregabalin (LYRICA) 150 MG capsule     prochlorperazine (COMPAZINE) 5 MG tablet     rOPINIRole (REQUIP) 0.25 MG tablet     sevelamer carbonate, RENVELA, 0.8 GM PACK Packet     silver nitrate (ARZOL) 75-25 % miscellaneous     sodium bicarbonate 325 MG tablet     thiamine (B-1) 100 MG tablet     tiZANidine (ZANAFLEX) 2 MG tablet     torsemide (DEMADEX) 20 MG tablet     triamcinolone (KENALOG) 0.1 % external cream     vitamin (B COMPLEX) capsule     No current facility-administered medications for this visit.     Past Medical  Past Medical History:   Diagnosis Date     ADHD 2008     Alcohol abuse, in remission      Alcoholic cirrhosis of liver with ascites (H)      Anemia in chronic kidney disease      Chronic pain      Current smoker      End stage renal disease (H)      Hypotension, unspecified hypotension type      Malnutrition (H)      Metabolic acidosis      Necrotizing pancreatitis      Pancreatic insufficiency      Pericarditis      Recurrent pleural effusion      Past Surgical  Past Surgical History:   Procedure Laterality Date     COMBINED ESOPHAGOSCOPY, GASTROSCOPY, DUODENOSCOPY (EGD), TISSUE APPOSI N/A 9/20/2021    Procedure: ESOPHAGOGASTRODUODENOSCOPY WITH SUTURE FISTULA CLOSURE, argon plasma coagulation;  Surgeon: Jose Quigley MD;  [facility]     ENDOSCOPIC INSERTION TUBE GASTROSTOMY N/A 7/13/2021     Procedure: Upper endoscopy, INSERTION, PEG-J TUBE and Gastropexy;  Surgeon: Rayshawn Will MD;  [facility]     ENDOSCOPIC INSERTION TUBE GASTROSTOMY  9/13/2021    Procedure: PEG/J PLACEMENT.;  Surgeon: Rayshawn Will MD;  [facility]     ENDOSCOPIC ULTRASOUND UPPER GASTROINTESTINAL TRACT (GI) N/A 7/21/2021    Procedure: ENDOSCOPIC ULTRASOUND, ESOPHAGOSCOPY / UPPER GASTROINTESTINAL TRACT (GI) with cyst gastrostomy, dilation;  Surgeon: Guru Yecenia Chacko MD;  [facility]     ENDOSCOPIC ULTRASOUND, ESOPHAGOSCOPY, GASTROSCOPY, DUODENOSCOPY (EGD), NECROSECTOMY N/A 8/11/2021    Procedure: ESOPHAGOGASTRODUODENOSCOPY (EGD) with necrosectomy, stent exchange.;  Surgeon: Amadou Beasley MD;  [facility]     ESOPHAGOSCOPY, GASTROSCOPY, DUODENOSCOPY (EGD), COMBINED N/A 7/28/2021    Procedure: ESOPHAGOGASTRODUODENOSCOPY (EGD), gastrostomy apposition, Necrosectomy, stent exchange.;  Surgeon: Rayshawn Will MD;  [facility]     ESOPHAGOSCOPY, GASTROSCOPY, DUODENOSCOPY (EGD), COMBINED N/A 8/6/2021    Procedure: ESOPHAGOGASTRODUODENOSCOPY (EGD) with necrosectomy, and stents exchanged;  Surgeon: Jose Quigley MD;  [facility]     ESOPHAGOSCOPY, GASTROSCOPY, DUODENOSCOPY (EGD), COMBINED Left 9/13/2021    Procedure: ESOPHAGOGASTRODUODENOSCOPY (EGD), GASTROSTOMY TUBE REMOVAL, FISTULAR CLOSURE ENDOSCOPIC.;  Surgeon: Rayshawn Will MD;  [facility]     IR ABSCESS TUBE CHANGE  7/14/2021     IR PARACENTESIS  7/21/2021     IR SINOGRAM INJECTION DIAGNOSTIC  8/25/2021     ORTHOPEDIC SURGERY Right     fracture repair     REPLACE GASTROSTOMY TUBE, PERCUTANEOUS N/A 7/28/2021    Procedure: gastro-jejunostomy tube exchange;  Surgeon: Rayshawn Will MD;  [facility]     Social History  Social History     Socioeconomic History     Marital status: Single     Spouse name: None     Number of children: None     Years of education: None     Highest education  "level: None   Tobacco Use     Smoking status: Current Every Day Smoker     Packs/day: 0.25     Types: Cigarettes     Smokeless tobacco: Never Used     Tobacco comment: 8/24/2021 Patient did not want to discuss quitting but would like 4 mg lozenge to use during admission. Patient also accepted \"Quitting for Good\" workbook   Vaping Use     Vaping Use: Never used     Passive vaping exposure: Yes   Substance and Sexual Activity     Alcohol use: Not Currently     Comment: \"no alcohol for over 1 year ago\" per pt on 5/12/22     Drug use: No     Sexual activity: Not Currently     Partners: Female     Family History  Family History   Problem Relation Age of Onset     Attention Deficit Disorder Mother      Alcoholism Mother      Alcoholism Father      Alcoholism Brother      Alcoholism Sister      Objective:  Reported vitals:  There were no vitals taken for this visit.   GEN: Healthy, alert and no distress  PSYCH: Alert and oriented times 3; coherent speech, normal rate and volume, able to articulate logical thoughts, able to abstract reason, no tangential thoughts, no hallucinations or delusions, affect seems normal  RESP: No cough, no audible wheezing, able to talk in full sentences  Remainder of exam unable to be completed due to virtual visit     Outside Imaging summaries:    Assessment and plan:  Mr Villareal is a 32yo gentleman with alcoholic cirrhosis, end stage renal diease and an episode necrotizing pancreatitis requiring endoscopic transluminal drainage. This was a very difficult visit to do over the phone as the patient had numerous complaints and has a very complex medical history. I therefore asked him to list his issues one by one for comment.    Issue:  1. Chronic pain, etiology unclear and possibly funcitonal: has been referred to a chronic pain specialist who recommended physical therapy (who will also help with his feet); I agree with this plan  2. Food intolerance: I suggested replacement of a feeding tube and " he declined this recommendation; he believes he is doing better as is given his past experiences; it is possible that he has gastroparesis and or ongoing outlet obstruction; we will organize a nuclear gastric emptying studying and an UGISBFT, respectively  3. Reflux: he takes Protonix 20mg, will be increased to 40mg once daily  4. Right flank pain, thought to be related to his kidney; suggested he follow up with his nephrologist     Return clinic visit (in person or by video, NOT PHONE) next available    It was a pleasure to participate in the care of this patient; please contact us with any further questions.  A total of 45 minutes was spent in evaluation with this patient, >50% of which was counseling regarding the above delineated issues.      Rayshawn Will MD PhD FACG ALEM MUSTAFA  Associate Professor of Medicine, Surgery and Pediatrics  Interventional and Therapeutic Endoscopy  GI Service Line Medical Director    [facility]  Division of Gastroenterology and Hepatology  [facility] [address] [address]    New Consultations  [phone]  Procedure Scheduling [phone]  Clinical Nurse Coordinator [phone]  Clinical Fax   [phone]  Administrative   [phone]  Administrative Fax  [phone]

## 2022-10-06 NOTE — PROGRESS NOTES
Dax is a 31 year old who is being evaluated via a billable telephone visit.      What phone number would you like to be contacted at? [phone]  How would you like to obtain your AVS? Joanne  Phone call duration: >30 minutes

## 2022-10-06 NOTE — TELEPHONE ENCOUNTER
I have refilled his Adderall.    I have put a new referral for pain bobo.   Referred him to [facility] pain group, for a full evaluations and management of his pian    He could increased his Lyrica to a full dose, 300 mg bid ( I sent a new refill for 300 mg bid )    Thanks

## 2022-10-06 NOTE — TELEPHONE ENCOUNTER
Routing to PCP to review/advise    Patient found out his PCP is quitting.    Wondering if there is anything in the short term he can take for his anxiety?    Adjust Hydroxyzine?    Patient twould like to discuss Xanax appointment on 10/11.  Was on it in the hospital.        RN received call from patient regarding above.    RN advised she would send message to provider.    Phil Daniel RN, BSN, PHN  [facility]

## 2022-10-07 RX ORDER — PANTOPRAZOLE SODIUM 40 MG/1
40 TABLET, DELAYED RELEASE ORAL DAILY
Qty: 90 TABLET | Refills: 2 | Status: SHIPPED | OUTPATIENT
Start: 2022-10-07 | End: 2023-01-23

## 2022-10-07 NOTE — TELEPHONE ENCOUNTER
He could continue with Hydorxyzine, one tab every 6 hours as needed  He is already on Duloxetine and Lyrica, both will help    thanks

## 2022-10-07 NOTE — TELEPHONE ENCOUNTER
Spent 30 plus minutes speaking with patient    He has many issues that have been discussed- much of the same that has been documented already.  Supportive listening, he does tend to be redundant with certain things so redirection was given (regarding nursing home) as New PCP cannot fix those things for him.  HE was very anxious about the appointments and that there would not be enough time to discuss with Dr Gomez.  He reports getting flustered and forgetful when he is pressured with time constraints.  I think he would benefit from a longer appointment time- I did schedule for a follow for 40 minutes.  He was more comfortable with being able to break down his problems into smaller chunks since he will have a few visits coming up.    Gave him medication advise as per Dr Gomez, pt will go to pharmacy and  medications.    He reports feeling better after speaking with RN team and is generally happy with this clinic.      Brianna Bradley RN

## 2022-10-11 ENCOUNTER — OFFICE VISIT (OUTPATIENT)
Dept: FAMILY MEDICINE | Facility: CLINIC | Age: 32
End: 2022-10-11
Payer: COMMERCIAL

## 2022-10-11 VITALS
BODY MASS INDEX: 23.31 KG/M2 | DIASTOLIC BLOOD PRESSURE: 80 MMHG | RESPIRATION RATE: 27 BRPM | HEART RATE: 110 BPM | HEIGHT: 67 IN | OXYGEN SATURATION: 98 % | SYSTOLIC BLOOD PRESSURE: 114 MMHG | TEMPERATURE: 98.1 F

## 2022-10-11 DIAGNOSIS — G89.29 CHRONIC PAIN OF LEFT KNEE: ICD-10-CM

## 2022-10-11 DIAGNOSIS — D63.1 ANEMIA IN CHRONIC KIDNEY DISEASE, ON CHRONIC DIALYSIS (H): ICD-10-CM

## 2022-10-11 DIAGNOSIS — K74.60 CIRRHOSIS OF LIVER WITHOUT ASCITES, UNSPECIFIED HEPATIC CIRRHOSIS TYPE (H): ICD-10-CM

## 2022-10-11 DIAGNOSIS — M21.6X1 ACQUIRED BILATERAL PES CAVUS: Primary | ICD-10-CM

## 2022-10-11 DIAGNOSIS — M21.6X2 ACQUIRED BILATERAL PES CAVUS: Primary | ICD-10-CM

## 2022-10-11 DIAGNOSIS — F10.11 ALCOHOL ABUSE, IN REMISSION: ICD-10-CM

## 2022-10-11 DIAGNOSIS — Z99.2 ANEMIA IN CHRONIC KIDNEY DISEASE, ON CHRONIC DIALYSIS (H): ICD-10-CM

## 2022-10-11 DIAGNOSIS — M25.562 CHRONIC PAIN OF LEFT KNEE: ICD-10-CM

## 2022-10-11 DIAGNOSIS — N18.6 ANEMIA IN CHRONIC KIDNEY DISEASE, ON CHRONIC DIALYSIS (H): ICD-10-CM

## 2022-10-11 PROCEDURE — 99214 OFFICE O/P EST MOD 30 MIN: CPT | Performed by: FAMILY MEDICINE

## 2022-10-11 RX ORDER — LIDOCAINE 50 MG/G
OINTMENT TOPICAL PRN
Qty: 240 G | Refills: 0 | Status: SHIPPED | OUTPATIENT
Start: 2022-10-11 | End: 2024-07-09

## 2022-10-11 ASSESSMENT — ANXIETY QUESTIONNAIRES
GAD7 TOTAL SCORE: 13
5. BEING SO RESTLESS THAT IT IS HARD TO SIT STILL: SEVERAL DAYS
IF YOU CHECKED OFF ANY PROBLEMS ON THIS QUESTIONNAIRE, HOW DIFFICULT HAVE THESE PROBLEMS MADE IT FOR YOU TO DO YOUR WORK, TAKE CARE OF THINGS AT HOME, OR GET ALONG WITH OTHER PEOPLE: EXTREMELY DIFFICULT
GAD7 TOTAL SCORE: 13
7. FEELING AFRAID AS IF SOMETHING AWFUL MIGHT HAPPEN: SEVERAL DAYS
4. TROUBLE RELAXING: MORE THAN HALF THE DAYS
8. IF YOU CHECKED OFF ANY PROBLEMS, HOW DIFFICULT HAVE THESE MADE IT FOR YOU TO DO YOUR WORK, TAKE CARE OF THINGS AT HOME, OR GET ALONG WITH OTHER PEOPLE?: EXTREMELY DIFFICULT
7. FEELING AFRAID AS IF SOMETHING AWFUL MIGHT HAPPEN: SEVERAL DAYS
3. WORRYING TOO MUCH ABOUT DIFFERENT THINGS: NEARLY EVERY DAY
1. FEELING NERVOUS, ANXIOUS, OR ON EDGE: NEARLY EVERY DAY
GAD7 TOTAL SCORE: 13
6. BECOMING EASILY ANNOYED OR IRRITABLE: NOT AT ALL
2. NOT BEING ABLE TO STOP OR CONTROL WORRYING: NEARLY EVERY DAY

## 2022-10-11 ASSESSMENT — PAIN SCALES - GENERAL: PAINLEVEL: EXTREME PAIN (8)

## 2022-10-11 NOTE — PROGRESS NOTES
Assessment & Plan     Acquired bilateral pes cavus    Chronic pain,  Currently using Lyrica 300 mg twice daily, and duloxetine.  added Voltaren gel and Xylocaine gel.  He is to continue with physical therapy, and OT.  I have put a new referral to pain management, [facility].  Previously, was seen by a pain group, August , 23 ,2022, there was no recommendation to start opioid or any narcotics.  He was referred to [facility] pain program,   He did not follow up.    Patient did ask for opioid, narcotic today.  However, I did discuss with him, with his chronic medical conditions and there was no recommendation from pain management to start opioid, I did not feel comfortable to start any new medications.  He was referred to a different pain management, [facility].    Patient does have chronic pain in his feet, secondary to acquired pes cavus, continue to have difficulty with ambulating, use Scooter and a walker in the house.  He is scheduled for evaluation for new scooter tomorrow.    Previously, was seen by podiatry, and was evaluated with Orthotics for fitted with shoes and insert, however, he report there was no full evaluation and they needed  $200 from him to get him  Insert, and that  money he does not have.  He did not follow up with Podiatry or Orthotics, and does not want to follow up with any of them.    He was not satisfied, unhappy at the end of his visit today.  Since I declined to give him any opioid or narcotic medications.  I explained to him we could work together, to try to reduce or achieve some, reduce of his pain, by trying different medications, such as adding topical cream and add Nortriptyline, especially if pain management does not recommend, narcotic.    He said he will look for a different Primary Care provider to establish care with.    - diclofenac (VOLTAREN) 1 % topical gel; Apply 2 gm to knees and feet qid as needed  - lidocaine (XYLOCAINE) 5 % external ointment; Apply topically as  "needed for moderate pain (qid as needed) Apply to feet and knees qid for 10 days, then as needed    Anemia in chronic kidney disease, on chronic dialysis (H)  Stable, 2nd to chronic kidney disease, on Hemodialysis.    Alcohol abuse, in remission  In Remission.    Cirrhosis of liver without ascites, unspecified hepatic cirrhosis type (H)      Chronic pain of left knee    - diclofenac (VOLTAREN) 1 % topical gel; Apply 2 gm to knees and feet qid as needed  - lidocaine (XYLOCAINE) 5 % external ointment; Apply topically as needed for moderate pain (qid as needed) Apply to feet and knees qid for 10 days, then as needed    Over 42 minutes spent reviewing chart, reviewing test results, talking with and examining patient, formulating plan, and documentation on the day of the encounter.      David Verduzco is a 31 year old presenting for the following health issues:  RECHECK      Review of Systems   Constitutional, HEENT, cardiovascular, pulmonary, gi and gu systems are negative, except as otherwise noted.      Objective    /80 (BP Location: Right arm, Patient Position: Chair, Cuff Size: Adult Regular)   Pulse 110   Temp 98.1  F (36.7  C) (Oral)   Resp 27   Ht 1.702 m (5' 7\")   SpO2 98%   BMI 23.31 kg/m    Body mass index is 23.31 kg/m .  Physical Exam   GENERAL: healthy, alert and no distress  PSYCH: mentation appears normal, affect normal/bright        Kole Gomez MD            "

## 2022-10-12 ENCOUNTER — HOSPITAL ENCOUNTER (OUTPATIENT)
Dept: OCCUPATIONAL THERAPY | Facility: CLINIC | Age: 32
Setting detail: THERAPIES SERIES
Discharge: HOME | End: 2022-10-12
Attending: FAMILY MEDICINE
Payer: COMMERCIAL

## 2022-10-12 ENCOUNTER — TELEPHONE (OUTPATIENT)
Dept: FAMILY MEDICINE | Facility: CLINIC | Age: 32
End: 2022-10-12

## 2022-10-12 DIAGNOSIS — M24.576 CONTRACTURE OF JOINT OF FOOT, UNSPECIFIED LATERALITY: ICD-10-CM

## 2022-10-12 PROCEDURE — 97542 WHEELCHAIR MNGMENT TRAINING: CPT | Mod: GO | Performed by: OCCUPATIONAL THERAPIST

## 2022-10-12 NOTE — TELEPHONE ENCOUNTER
Patient calling to clarify plan to treat his plan. Writer reviewed last provider note, advised patient new referral for [facility] was sent at last office visit 10/6/22. Phone number for [facility] was provided to patient. He will call back if he has any additional questions.     Pia DIA RN  [facility]

## 2022-10-12 NOTE — PROGRESS NOTES
"   SEATING AND WHEELED MOBILITY ASSESSMENT  10/12/22 1400   Quick Adds   Quick Adds Current Power Wheelchair   General Information    Rehab Discipline OT   Funding [facility]/Waiver   Service Outpatient;Occupational Therapy;Seating/Wheeled Mobility Evaluation   Height 5'7\"   Weight 148   Start Of Care Date 10/12/22   Referring Physician Kole Gomez MD   Orders Evaluate And Treat As Indicated;Per Therapist Evaluation   Orders Date 09/12/22   Patient/Caregiver Goals Power mobility replacement   Rehabilitation Technology Supplier Jessi DUMONT from [employer]   Current Community Support Personal Care Attendant;Family/Friend Caregiver;Transportation Service  (20 hours a week)   Patient role/Employment history Disabled   Fall Risk Screen   Fall screen completed by OT   Have you fallen 2 or more times in the past year? No   Have you fallen and had an injury in the past year? No   Is patient a fall risk? Yes   Medical History   Onset Of Illness/injury Or Date Of Surgery 9/12/22   Medical Diagnosis ADHD  Date Unknown  Alcohol abuse, in remission  Date Unknown  Alcoholic cirrhosis of liver with ascites (H)  Date Unknown  Anemia in chronic kidney disease  Date Unknown  Chronic pain  Date Unknown  Current smoker  Date Unknown  End stage renal disease (H)  Date Unknown  Hypotension, unspecified hypotension type  Date Unknown  Malnutrition (H)  Date Unknown  Metabolic acidosis  Date Unknown  Necrotizing pancreatitis  Date Unknown  Pancreatic insufficiency  Date Unknown  Pericarditis  Date Unknown  Recurrent pleural effusion   Medical History Pes cavis   Comments Dialysis 3 days a week,   Current Power Wheelchair   Power Wheelchair Comments Private pay go chair that no longer meets needs   Home Accessibility   Living Environment Apartment/condo   Primary Entrance Level;Elevator   All Rooms Wheelchair Accessible Yes   Community ADL   Transportation Transportation Services   Community Mobility Requirements Medical " Appointments;Shopping   Cognitive/Visual/Hearing Status   Observations No Problems Observed During Evaluation   Vision Intact   Hearing Intact   ADL Status   Feeding Independent   Grooming/Hygiene Independent   Dressing Requires Assist   Toileting Uses Equipment;Independent  (grab bars)   Bathing Independent  (grab bars and shower chair)   Meal Preparation Requires Assist   Home Management Requires Assist   Balance   Unsupported Sitting Balance Uses Upper Extremities for Balance   Sitting Balance in Chair Uses Upper Extremities for Balance   Standing Balance Uses Upper Extremities for Balance   Ambulation   Ambulation Non Ambulatory   Ambulation Comments bearing weight is challenging   Transfers   Transfer Assist Independent   Transfer Method Stand Pivot   Sleep/Rest   Sleep Surface/Equipment standard bed   Wheelchair Ability   Wheelchair Ability Quick Adds Power Chair;Wheelchair Use   Power Wheelchair Propulsion   Operate Power Wheelchair Independent;Standard Joystick   Wheelchair Use   Ability to Perform Weight Shifts Independent   Bed Confined Without Wheelchair? Yes   Hours in Wheelchair Daily 10   Neuromuscular   History of Pressure Sores No   Pain Yes   Pain Location LE with walking   Pain Scale  10   Coordination UE Intact;LE Intact   Sensory Deficits Reported B Le neuropathies   Head and Neck   Head and Neck Position Functional   Upper Extremities   UE ROM WFL   UE Strength 4+/5   Dominance Right   Pelvis   Anterior/Posterior Pelvis Position Posterior Tilt   Trunk   Anterior/Posterior Trunk Position Increased Thoracic Kyphosis   Lower Extremities   LE ROM WFL with limited ankle ranges to neutral only   LE Strength 4+/5 with ankle and feet limitations due to contractures   Foot Positioning Inversion   LE Comments foot deformities   Patient Measurements   Other per atp notes   Education Assessment   Barriers to Learning Physical   Preferred Learning Style Listening;Demonstration   Assessment/Plan   Criteria  for Skilled Interventions Met Yes, Treatment Indicated   Treatment Diagnosis impaired participation in MRADLS   Therapy Frequency once   Planned Therapy Interventions Wheelchair Management/Propulsion Training   Planned Therapy Interventions Comments Determined need for replacement power chair as his private pay chair is ill fitting and will not hold a charge. He now needs this device full time due to foot contractures and severe pain in feet while waiting for liver/kidney transplant.   Risks and benefits of treatment have been explained Yes   Patient/family & other staff in agreement with plan of care Yes   Session Time   OT Wheelchair Management Minutes (44677) 40   Adult OT Eval Goals   OT Eval Goals (Adult) 1    OT Goal 1   Goal Identifier wheelchair   Goal Description Patient/family demonstrates understanding of equipment for independent mobility, including benefits/limitations;   Goal Progress met today   Target Date 10/12/22   Date Met 10/12/22   Coverage Criteria Per Local Coverage Determination    A) Dax has mobility limitations due to Pes cavus, ADHD Alcohol abuse, in remission,  Alcoholic cirrhosis of liver with ascites (H) , Anemia in chronic kidney disease, Chronic pain  Current smoker  End stage renal disease,  Hypotension, unspecified hypotension type , Malnutrition (H) Metabolic acidosis  Necrotizing pancreatitis  Pancreatic insufficiency Pericarditis  Recurrent pleural effusion that significantly impairs his ability to participate in all of his mobility-related activities of daily living (MRADL). Specifically affected are toileting (being able to get there in time to prevent accidents), dressing, and bathing (getting into the bathroom of designated place). Current equipment used is private pay standard go chair that is ill fitting and will not hold a charge. This patient needs the new equipment requested to be able to continue to . Please see additional documentation in the seating and wheeled  mobility report for details.   Dax had a successful clinical trial here, and also a successful trial at home with the recommended equipment. He is very willing and physically / cognitively able to use the recommended equipment to assist his with mobility related activities of daily living and general mobility.     B) Dax's mobility limitation cannot be sufficiently and safely resolved by the use of an appropriately fitted cane or walker because he is non ambulatory due to foot deformite. TUG results NT as patient unable. Distance and time to ambulate Nt as not yamilet. Strength of legs is 4+/5 for one maximal repetition. Fatigue also  impacts this patient's ability to ambulate, regardless of the gait aid.    C) Dax does not have sufficient upper extremity function to self-propel an optimally-configured manual wheelchair in his home to perform MRADLs during a typical day due to limitations in strength, endurance, range of motion, and coordination. Distance and time to propel a light weight manual wheelchair NT due to endurance limitations secondary to medical complications.  Strength of arms is 4+/5.    D)  Dax is not able to use a POV/scooter because it will not fit in his home environment. He is unable to safely transfer to and from a POV,  unable to operate the tiller steering system, and unable to maintain postural stability and position while operating the POV. He needs more appropriate seating and positioning than any scooter seat provides.    E) The need for this equipment is LIFETIME.     RECOMMENDATIONS FOR MOBILITY BASE, SEATING SYSTEM AND COMPONENTS  Cristiane HUNG4 - this mid wheel drive power wheelchair is needed for this patient to continue to have independent mobility and to be able to allow his to complete or assist in all of his mobility related activities of daily living (MRADLs). This wheelchair will also have the seating and positioning system and seat function she needs to be able to use and tolerate  the wheelchair full time, and have functional and comfortable positioning for a full day's activities. Dax has Pes cavus foot deformity and liver cirrhosis with CKD which impairs his ability to move in his home without the use of the requested wheelchair. He lives in a aparment with level access and uses transport services for transportation.     2 Batteries and  - gel sealed, and two are necessary to power the wheelchair. They are maintenance free and are safe for travel on the road or in the air. They are necessary to provide reliable use of the power wheelchair on a single charge.     Swing away joystick mount - needed to be able to move the joystick out of the way during transfers, or when at the desksing the computer, or at the table eating, so as not to inadvertently hit the joystick, thus moving the wheelchair or turning the power on or off without his knowledge.     2 post, flip back, height adjustable, removable, full length armrests - needed for arm support at appropriate height, not available with standard armrests     This equipment is reasonable and necessary with reference to accepted standards of medical practice and treatment of this patient's condition and is not being recommended as a convenience item. Without this recommended equipment, he is highly likely to sustain injuries from falls, develop pressure sores or postural compensation, and/or be bed confined, which those costs far exceed the cost of the requested equipment.    I have read and concur with the above recommendations.    Physician Printed Name __________________________________________    Physician Signature  _____________________________________________    Date of Signature ______________________________    Physician Phone  ______________________________      Electronically signed by:  Jessi ROTH, ATP      Occupational Therapist, Assistive   [phone]      fax: [phone]       [email or web address]  Seating Clinic- [facility], [address]  [address]  [place]   [address]  October 12, 2022

## 2022-10-14 ENCOUNTER — TELEPHONE (OUTPATIENT)
Dept: FAMILY MEDICINE | Facility: CLINIC | Age: 32
End: 2022-10-14

## 2022-10-14 NOTE — TELEPHONE ENCOUNTER
Patient calling     He continues to wake up very nauseous and throwing up, he feels miserable.  He reports this continues to be a daily occurrence    He does have compazine he can take but did ask that a message be sent to his GI provider        ROB Reed    Triage Nurse  [facility]  Appointment line: [phone]  [facility], 24 hour nurse line, available by calling clinic at [phone] and following prompts.

## 2022-10-17 DIAGNOSIS — K85.91 NECROTIZING PANCREATITIS: ICD-10-CM

## 2022-10-18 ENCOUNTER — TELEPHONE (OUTPATIENT)
Dept: FAMILY MEDICINE | Facility: CLINIC | Age: 32
End: 2022-10-18

## 2022-10-18 NOTE — TELEPHONE ENCOUNTER
RN received call from patient.    Patient called stating he was told by [facility] that he needed to contact [facility] insurance to determine if they were in network.    Patient then stated they needed a referral.    Patient very frustrated that this has been taking so long and he continues to be in pain and nothing is being done.    RN advised patient that he would need to contact his insurance company to determine if they were in Network.    RN advised if [facility] needed a referral,  RN would re fax to them.    Patient asked for clarification if he needed to contact insurance or in RN would do it.    RN advised patient would need to contact insurance.    Patient requesting Dr peters to call him to discuss.    RN will follow up with pain management/[facility].    Phil Daniel RN, BSN, PHN  [facility]

## 2022-10-18 NOTE — TELEPHONE ENCOUNTER
Routing to referral specialist. Regarding [facility]. Patient's insurance is restricted. [facility] relayed that referral may need to be sent to the insurance company.

## 2022-10-18 NOTE — TELEPHONE ENCOUNTER
"Patient called back wanting prescription for pain control.      Huddled with PCP.  PCP had discussed with patient at last visit and offered non opioid pain medication which the patient declined and reportedly was going to find a new PCP.    Called patient back and let him know Dr Gomez can send an Rx other than opoid.  Patient states that \"someone is going to call here and talk\" to us. He states this person has authorization (verbal) for his Aunt Danitza Joshua to speak on his behalf and we can talk to her about anything medical.    Spoke in detail with Danitza regarding pt's referrals, medications, specialty providers, who manages which meds/certain symptoms, DME;s ect.  Dax may need additional help as he sometimes gets confused with all the referrals and specialist that he does have to work with.    Danitza plans on calling pt to discuss with him, she would like for the patient to discuss his concerns with her first then she can call.  Let her know will need documentation that pt approves us speaking with her.     Will mail consent to pt to sign.      Brianna Bradley RN     "

## 2022-10-25 ENCOUNTER — TELEPHONE (OUTPATIENT)
Dept: FAMILY MEDICINE | Facility: CLINIC | Age: 32
End: 2022-10-25

## 2022-10-25 NOTE — TELEPHONE ENCOUNTER
Forms received from [employer]/ Before the Call Mobility Device - chart notes signed, OT evaluation signed and Standard Written order  for Dr Gomez.  Forms placed in provider 'sign me' folder.  Please fax forms to [phone] after completion.    SEBASTIAN Hernandez  [facility]

## 2022-10-27 ENCOUNTER — TELEPHONE (OUTPATIENT)
Dept: FAMILY MEDICINE | Facility: CLINIC | Age: 32
End: 2022-10-27

## 2022-10-29 ENCOUNTER — HEALTH MAINTENANCE LETTER (OUTPATIENT)
Age: 32
End: 2022-10-29

## 2022-11-02 DIAGNOSIS — F90.9 ATTENTION DEFICIT HYPERACTIVITY DISORDER (ADHD), UNSPECIFIED ADHD TYPE: ICD-10-CM

## 2022-11-02 NOTE — TELEPHONE ENCOUNTER
Reason for Call:  Other call back    Detailed comments: Pt stated wants care team to call him back regarding his prescription . Would not disclose which medications.    Phone Number Patient can be reached at: Cell number on file:    Telephone Information:   Mobile [phone]       Best Time: anytime    Can we leave a detailed message on this number? YES    Call taken on 11/2/2022 at 6:05 PM by Heidi Jeffers

## 2022-11-03 RX ORDER — DEXTROAMPHETAMINE SACCHARATE, AMPHETAMINE ASPARTATE, DEXTROAMPHETAMINE SULFATE AND AMPHETAMINE SULFATE 5; 5; 5; 5 MG/1; MG/1; MG/1; MG/1
TABLET ORAL
Qty: 60 TABLET | Refills: 0 | Status: SHIPPED | OUTPATIENT
Start: 2022-11-03 | End: 2022-12-06

## 2022-11-03 RX ORDER — DEXTROAMPHETAMINE SACCHARATE, AMPHETAMINE ASPARTATE MONOHYDRATE, DEXTROAMPHETAMINE SULFATE AND AMPHETAMINE SULFATE 7.5; 7.5; 7.5; 7.5 MG/1; MG/1; MG/1; MG/1
30 CAPSULE, EXTENDED RELEASE ORAL DAILY
Qty: 30 CAPSULE | Refills: 0 | Status: SHIPPED | OUTPATIENT
Start: 2022-11-03 | End: 2022-12-06

## 2022-11-03 NOTE — TELEPHONE ENCOUNTER
Medication refills,        [place] Prescription Monitoring Program, ( ) referenced. No indication of misuse, or abuse.

## 2022-12-05 ENCOUNTER — MYC REFILL (OUTPATIENT)
Dept: FAMILY MEDICINE | Facility: CLINIC | Age: 32
End: 2022-12-05

## 2022-12-05 DIAGNOSIS — F90.9 ATTENTION DEFICIT HYPERACTIVITY DISORDER (ADHD), UNSPECIFIED ADHD TYPE: ICD-10-CM

## 2022-12-05 RX ORDER — DEXTROAMPHETAMINE SACCHARATE, AMPHETAMINE ASPARTATE, DEXTROAMPHETAMINE SULFATE AND AMPHETAMINE SULFATE 5; 5; 5; 5 MG/1; MG/1; MG/1; MG/1
TABLET ORAL
Qty: 60 TABLET | Refills: 0 | Status: CANCELLED | OUTPATIENT
Start: 2022-12-05

## 2022-12-05 RX ORDER — DEXTROAMPHETAMINE SACCHARATE, AMPHETAMINE ASPARTATE MONOHYDRATE, DEXTROAMPHETAMINE SULFATE AND AMPHETAMINE SULFATE 7.5; 7.5; 7.5; 7.5 MG/1; MG/1; MG/1; MG/1
30 CAPSULE, EXTENDED RELEASE ORAL DAILY
Qty: 30 CAPSULE | Refills: 0 | Status: CANCELLED | OUTPATIENT
Start: 2022-12-05

## 2022-12-06 DIAGNOSIS — F90.9 ATTENTION DEFICIT HYPERACTIVITY DISORDER (ADHD), UNSPECIFIED ADHD TYPE: ICD-10-CM

## 2022-12-06 RX ORDER — DEXTROAMPHETAMINE SACCHARATE, AMPHETAMINE ASPARTATE, DEXTROAMPHETAMINE SULFATE AND AMPHETAMINE SULFATE 5; 5; 5; 5 MG/1; MG/1; MG/1; MG/1
TABLET ORAL
Qty: 60 TABLET | Refills: 0 | Status: SHIPPED | OUTPATIENT
Start: 2022-12-06 | End: 2023-01-23

## 2022-12-06 RX ORDER — DEXTROAMPHETAMINE SACCHARATE, AMPHETAMINE ASPARTATE MONOHYDRATE, DEXTROAMPHETAMINE SULFATE AND AMPHETAMINE SULFATE 7.5; 7.5; 7.5; 7.5 MG/1; MG/1; MG/1; MG/1
30 CAPSULE, EXTENDED RELEASE ORAL DAILY
Qty: 30 CAPSULE | Refills: 0 | Status: SHIPPED | OUTPATIENT
Start: 2022-12-06 | End: 2023-01-23

## 2022-12-06 NOTE — TELEPHONE ENCOUNTER
I have sent a refill for him.  Last visit with me was on 10/11/2022. During that visit, he was not happy with my care and was to establish with a new provider.  I have sent one more refill, he need to establish with a new provider.    thanks

## 2022-12-22 NOTE — PROGRESS NOTES
Nephrology Progress Note  10/08/2021       Dax Villareal is a 30 year old male with h/o ETOH hepatitis, end stage liver disease, RADHA on HD, transferred from [facility] in [place] for septic shock on 6/28/21 for treatment of necrotizing pancreatitis and decompensated liver disease. He was initially admitted to [facility] 5/19/21 and started on RRT 5/26/2021.        Interval History :   Mr Villareal had necrosectomy for 4th time on 8/11 with no more anticipated, medically ready for TCU but awaiting placement.  Running HD today, ordered for 0-1L of UF, wt's have been stable recently and GI output is close to matching intake generally.        Assessment & Recommendations:   ESRD-Cr was 0.8 as recently as 5/19/2021 but now HD dependent for 3 months, started RRT at [facility] prior to transfer to [facility] on 5/26.  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  continuing restorative cares for now, BP's have been stable enough to support HD.  Had GI stenting of pancreas x4.                 -Line is TDC from PTA               -Planning HD on MWF schedule, refuses extra runs, will continue to offer.       Volume status-Anuric, wt not reliable, pulling 0-1L of UF with run today as his GI drain output was more than intake yesterday and SBP's are in 90's.     Electrolytes/pH-Drawing labs on runs, last Na 125, runs chronically low, should correct some on run today.  K 3.1, and bicarb 25 no acute issues.      Ca/phos/pth-Ca 10.2, Mg 1.7, Phos 5.1 last check, drawing labs on runs. On Sevelamer 1.6g TID, is on Ca carbonate as well and should come down with HD, following closely.                     Anemia-Hgb 9.3, acute management per team, last PRBC's 7/14.  On EPO 5k with runs, iron sats 43%      Nutrition-Taking PO, appetite poor lately, on Vital TF but having frequent nausea.     Seen and discussed with Dr Nogueira      Recommendations were communicated to  "primary team via note       Tor Larsen, SOFIA CNS  Clinical Nurse Specialist  [phone]      Review of Systems:   I reviewed the following systems:  Gen: No fevers or chills  CV: No CP at rest  Resp: No SOB at rest  GI: No N/V    Physical Exam:   I/O last 3 completed shifts:  In: 615 [NG/GT:120]  Out: 150 [Emesis/NG output:150]   BP (!) 91/30 (BP Location: Left arm)   Pulse 88   Temp 98.3  F (36.8  C) (Oral)   Resp 18   Ht 1.676 m (5' 6\")   Wt 64.5 kg (142 lb 3.2 oz)   SpO2 92%   BMI 22.95 kg/m       GENERAL APPEARANCE: Lying in bed, in no distress.    EYES:  scleral icterus, pupils equal  Pulmonary: decreased BS   CV: tachy   - Edema - no LE edema  GI: mild distention, non-tender. + tenderness at G-tube site and generalized abdominal pain.    MS: no evidence of inflammation in joints, no muscle tenderness  SKIN: no rash, warm, dry, no cyanosis, + G tube  NEURO: alert/interactive  ACCESS: Right TDC with no induration or erythema    Labs:   All labs reviewed by me  Electrolytes/Renal - Recent Labs   Lab Test 10/07/21  2234 10/06/21  1330 10/04/21  1330 10/02/21  1358 10/01/21  1052 09/30/21 2013 09/30/21 2013 09/30/21  1054 09/30/21  1054   * 122* 125*   < > 125*  125*   < > 127*   < > 128*   POTASSIUM 3.4 3.3* 3.1*   < > 3.7  3.7   < > 4.1   < > 2.9*   CHLORIDE 87* 84* 89*   < > 91*  91*   < > 93*   < > 92*   CO2 25 21 25   < > 22  22   < > 25   < > 25   BUN 46* 64* 52*   < > 51*  51*   < > 42*   < > 34*   CR 3.95* 4.77* 3.79*   < > 4.27*  4.27*   < > 3.58*   < > 3.13*   * 108* 138*   < > 127*  127*   < > 94   < > 114*   JANENE 10.2* 10.5* 9.6   < > 10.3*  10.3*   < > 9.6   < > 9.5   MAG  --   --   --   --  1.7  --  1.9  --  1.9   PHOS  --  6.3*  --   --  5.1*  --   --   --  4.3    < > = values in this interval not displayed.       CBC -   Recent Labs   Lab Test 10/06/21  1531 10/04/21  1330 10/02/21  1034   WBC 7.1 9.0 9.2   HGB 8.7* 9.3* 8.9*    267 239       LFTs -   Recent " Labs   Lab Test 10/01/21  1052 09/28/21  1005 09/04/21  0414 09/03/21  0523 09/03/21  0523   ALKPHOS 96  --  81  --  95   BILITOTAL 0.9  --  1.4*  --  2.0*   ALT 17  --  29  --  31   AST 23  --  38  --  39   PROTTOTAL 8.7  --  8.8  --  9.2*   ALBUMIN 2.7* 2.5* 2.9*   < > 2.3*    < > = values in this interval not displayed.       Iron Panel -   Recent Labs   Lab Test 08/14/21 2055 07/19/21  0632   IRON 28* 31*   IRONSAT 21 43   JERRELL 2,186*  --            Current Medications:    sodium chloride 0.9%  250 mL Intravenous Once in dialysis/CRRT     sodium chloride 0.9%  300 mL Hemodialysis Machine Once     sodium bicarbonate  325 mg Per Feeding Tube Q4H    And     amylase-lipase-protease  1-2 capsule Per Feeding Tube Q4H     amylase-lipase-protease  2 capsule Per G Tube TID w/meals     B and C vitamin Complex with folic acid  5 mL Per Feeding Tube Daily     epoetin charles-epbx (RETACRIT) inj ESRD  5,000 Units Intravenous Once in dialysis/CRRT     fiber modular (NUTRISOURCE FIBER)  1 packet Per Feeding Tube TID     folic acid  1 mg Oral or Feeding Tube Daily     gabapentin  100 mg Oral Once per day on Mon Wed Fri     heparin lock flush  5-20 mL Intracatheter Q24H     influenza quadrivalent (PF) vacc  0.5 mL Intramuscular Prior to discharge     melatonin  6 mg Oral or Feeding Tube At Bedtime     menthol   Transdermal Q8H     methylphenidate  5 mg Per Feeding Tube BID     midodrine  15 mg Per Feeding Tube TID     - MEDICATION INSTRUCTIONS -   Does not apply Once     pantoprazole  40 mg Oral or Feeding Tube QAM AC     potassium chloride  20 mEq Per Feeding Tube Daily     protein modular  1 packet Per Feeding Tube Daily     sevelamer carbonate (RENVELA)  1.6 g Oral or Feeding Tube TID     sodium chloride (PF)  3 mL Intracatheter Q8H     sodium chloride (PF)  9 mL Intracatheter During Dialysis/CRRT (from stock)     sodium chloride (PF)  9 mL Intracatheter During Dialysis/CRRT (from stock)     vitamin B1  100 mg Oral or Feeding  Tube Daily       - MEDICATION INSTRUCTIONS -       dextrose       heparin (porcine) 1,000 Units/hr (08/24/21 0980)            No

## 2023-01-10 ENCOUNTER — TELEPHONE (OUTPATIENT)
Dept: FAMILY MEDICINE | Facility: CLINIC | Age: 33
End: 2023-01-10

## 2023-01-10 DIAGNOSIS — K85.22 ALCOHOL-INDUCED ACUTE PANCREATITIS WITH INFECTED NECROSIS: ICD-10-CM

## 2023-01-10 DIAGNOSIS — F90.9 ATTENTION DEFICIT HYPERACTIVITY DISORDER (ADHD), UNSPECIFIED ADHD TYPE: ICD-10-CM

## 2023-01-10 RX ORDER — DEXTROAMPHETAMINE SACCHARATE, AMPHETAMINE ASPARTATE MONOHYDRATE, DEXTROAMPHETAMINE SULFATE AND AMPHETAMINE SULFATE 7.5; 7.5; 7.5; 7.5 MG/1; MG/1; MG/1; MG/1
30 CAPSULE, EXTENDED RELEASE ORAL DAILY
Qty: 30 CAPSULE | Refills: 0 | OUTPATIENT
Start: 2023-01-10

## 2023-01-10 RX ORDER — ACETAMINOPHEN 325 MG/10.15ML
325 LIQUID ORAL EVERY 4 HOURS PRN
OUTPATIENT
Start: 2023-01-10

## 2023-01-10 NOTE — TELEPHONE ENCOUNTER
PT called would to have his medication refilled     Medication has been pended.  patient is completely out of [facility]       Azalea Bell    [facility]

## 2023-01-10 NOTE — TELEPHONE ENCOUNTER
Team, please reach out to patient to inform that he is due for an appointment for further refills, per PCP. See 12/5/22 Mychart refill request for reference if needed.     Dr. Gomez noted that at his last visit, patient stated that he was going to establish care with a new PCP moving forward, after being dissatisfied with Dr. Gomez.         Donna Metz, RN, BSN, PHN  [facility]: [place]

## 2023-01-11 NOTE — TELEPHONE ENCOUNTER
Called Pt unable to leave VM Message call just gives busy tone  Sent Mychart message :French Verduzco,      Per   You will need to schedule a visit with a new provider for any further refills of this medication.     He notes that at the time of your last visit, you indicated that you were going to establish care with another provider, as you were not happy with his care. If you have not done so, please do so before your next refill.          Azalea Bell    [facility]

## 2023-01-16 ENCOUNTER — TELEPHONE (OUTPATIENT)
Dept: FAMILY MEDICINE | Facility: CLINIC | Age: 33
End: 2023-01-16

## 2023-01-16 NOTE — TELEPHONE ENCOUNTER
Routing to pcp        PT finally called back after many attempts, message was finally addressed to him in regards to the refill medications, per the PT he stated that he never requested to have a different PCP or mentioned that he was going to be seeking for a new a PCP.    See message below:  Team, please reach out to patient to inform that he is due for an appointment for further refills, per PCP. See 12/5/22 [facility] refill request for reference if needed.      Dr. Gomez noted that at his last visit, patient stated that he was going to establish care with a new PCP moving forward, after being dissatisfied with Dr. Gomez.    Elicia Carter MESSAGE SENT TO HIM ON 12/15Dr. Jason has provided one refill of this medication. You will need to schedule a visit with a new provider for any further refills of this medication.     He notes that at the time of your last visit, you indicated that you were going to establish care with another provider, as you were not happy with his care. If you have not done so, please do so before your next refill.      Please let me know what you like to do in regards to this.  If your okay still seeing him per PT so I can get him scheduled.    Thanks,      Azalea Bell    [facility]

## 2023-01-16 NOTE — TELEPHONE ENCOUNTER
If he decide to continue his care with me, then need an appt in the clinic.  Last visit been over 4 months ago,.   will not do any more refills until he comes in the clinic  If he decide to come to the clinic for a follow up appt. He should not bring the issue of his pain during his visit and should not ask for pain medications.    He could establish with a new provider,       thanks

## 2023-01-23 ENCOUNTER — OFFICE VISIT (OUTPATIENT)
Dept: FAMILY MEDICINE | Facility: CLINIC | Age: 33
End: 2023-01-23
Payer: COMMERCIAL

## 2023-01-23 VITALS
TEMPERATURE: 98.6 F | DIASTOLIC BLOOD PRESSURE: 60 MMHG | HEART RATE: 108 BPM | SYSTOLIC BLOOD PRESSURE: 102 MMHG | WEIGHT: 171 LBS | BODY MASS INDEX: 26.84 KG/M2 | RESPIRATION RATE: 20 BRPM | OXYGEN SATURATION: 96 % | HEIGHT: 67 IN

## 2023-01-23 DIAGNOSIS — N18.4 ANEMIA IN STAGE 4 CHRONIC KIDNEY DISEASE (H): ICD-10-CM

## 2023-01-23 DIAGNOSIS — F10.11 ALCOHOL ABUSE, IN REMISSION: ICD-10-CM

## 2023-01-23 DIAGNOSIS — D63.1 ANEMIA IN STAGE 4 CHRONIC KIDNEY DISEASE (H): ICD-10-CM

## 2023-01-23 DIAGNOSIS — K74.60 CIRRHOSIS OF LIVER WITHOUT ASCITES, UNSPECIFIED HEPATIC CIRRHOSIS TYPE (H): ICD-10-CM

## 2023-01-23 DIAGNOSIS — G62.9 PERIPHERAL POLYNEUROPATHY: ICD-10-CM

## 2023-01-23 DIAGNOSIS — Z23 NEED FOR COVID-19 VACCINE: ICD-10-CM

## 2023-01-23 DIAGNOSIS — M21.6X1 ACQUIRED BILATERAL PES CAVUS: ICD-10-CM

## 2023-01-23 DIAGNOSIS — L60.8 ACQUIRED DYSMORPHIC TOENAIL: ICD-10-CM

## 2023-01-23 DIAGNOSIS — F90.9 ATTENTION DEFICIT HYPERACTIVITY DISORDER (ADHD), UNSPECIFIED ADHD TYPE: Primary | ICD-10-CM

## 2023-01-23 DIAGNOSIS — M21.6X2 ACQUIRED BILATERAL PES CAVUS: ICD-10-CM

## 2023-01-23 DIAGNOSIS — Z79.899 ENCOUNTER FOR LONG-TERM (CURRENT) USE OF MEDICATIONS: ICD-10-CM

## 2023-01-23 DIAGNOSIS — G25.81 RESTLESS LEGS SYNDROME: ICD-10-CM

## 2023-01-23 PROBLEM — J90 RECURRENT PLEURAL EFFUSION: Status: RESOLVED | Noted: 2022-07-14 | Resolved: 2023-01-23

## 2023-01-23 PROBLEM — E43 SEVERE MALNUTRITION (H): Status: RESOLVED | Noted: 2021-06-28 | Resolved: 2023-01-23

## 2023-01-23 PROBLEM — F17.200 CURRENT SMOKER: Status: RESOLVED | Noted: 2022-07-14 | Resolved: 2023-01-23

## 2023-01-23 PROBLEM — E87.20 METABOLIC ACIDOSIS: Status: RESOLVED | Noted: 2022-07-14 | Resolved: 2023-01-23

## 2023-01-23 PROBLEM — I95.9 HYPOTENSION, UNSPECIFIED HYPOTENSION TYPE: Status: RESOLVED | Noted: 2022-07-14 | Resolved: 2023-01-23

## 2023-01-23 PROBLEM — E46 MALNUTRITION (H): Status: RESOLVED | Noted: 2022-07-14 | Resolved: 2023-01-23

## 2023-01-23 PROCEDURE — 11719 TRIM NAIL(S) ANY NUMBER: CPT | Performed by: FAMILY MEDICINE

## 2023-01-23 PROCEDURE — 91312 COVID-19 VACCINE BIVALENT BOOSTER 12+ (PFIZER): CPT | Performed by: FAMILY MEDICINE

## 2023-01-23 PROCEDURE — 0124A COVID-19 VACCINE BIVALENT BOOSTER 12+ (PFIZER): CPT | Performed by: FAMILY MEDICINE

## 2023-01-23 PROCEDURE — 99214 OFFICE O/P EST MOD 30 MIN: CPT | Mod: 25 | Performed by: FAMILY MEDICINE

## 2023-01-23 RX ORDER — TORSEMIDE 20 MG/1
TABLET ORAL
Qty: 180 TABLET | Refills: 3 | Status: CANCELLED | OUTPATIENT
Start: 2023-01-23

## 2023-01-23 RX ORDER — SEVELAMER CARBONATE FOR ORAL SUSPENSION 800 MG/1
1.6 POWDER, FOR SUSPENSION ORAL 3 TIMES DAILY
Status: CANCELLED | OUTPATIENT
Start: 2023-01-23

## 2023-01-23 RX ORDER — ROPINIROLE 0.25 MG/1
0.25 TABLET, FILM COATED ORAL AT BEDTIME
Qty: 90 TABLET | Refills: 3 | Status: SHIPPED | OUTPATIENT
Start: 2023-01-23 | End: 2024-04-18

## 2023-01-23 RX ORDER — DEXTROAMPHETAMINE SACCHARATE, AMPHETAMINE ASPARTATE, DEXTROAMPHETAMINE SULFATE AND AMPHETAMINE SULFATE 5; 5; 5; 5 MG/1; MG/1; MG/1; MG/1
TABLET ORAL
Qty: 60 TABLET | Refills: 0 | Status: SHIPPED | OUTPATIENT
Start: 2023-02-23 | End: 2023-12-29

## 2023-01-23 RX ORDER — LANOLIN ALCOHOL/MO/W.PET/CERES
100 CREAM (GRAM) TOPICAL DAILY
Status: CANCELLED | OUTPATIENT
Start: 2023-01-23

## 2023-01-23 RX ORDER — DEXTROAMPHETAMINE SACCHARATE, AMPHETAMINE ASPARTATE MONOHYDRATE, DEXTROAMPHETAMINE SULFATE AND AMPHETAMINE SULFATE 7.5; 7.5; 7.5; 7.5 MG/1; MG/1; MG/1; MG/1
30 CAPSULE, EXTENDED RELEASE ORAL DAILY
Qty: 30 CAPSULE | Refills: 0 | Status: SHIPPED | OUTPATIENT
Start: 2023-02-23 | End: 2023-04-17

## 2023-01-23 RX ORDER — PANTOPRAZOLE SODIUM 40 MG/1
40 TABLET, DELAYED RELEASE ORAL DAILY
Qty: 90 TABLET | Refills: 2 | Status: SHIPPED | OUTPATIENT
Start: 2023-01-23 | End: 2024-08-06

## 2023-01-23 RX ORDER — PREGABALIN 300 MG/1
CAPSULE ORAL
Qty: 180 CAPSULE | Refills: 3 | Status: SHIPPED | OUTPATIENT
Start: 2023-01-23 | End: 2023-04-13

## 2023-01-23 RX ORDER — DEXTROAMPHETAMINE SACCHARATE, AMPHETAMINE ASPARTATE, DEXTROAMPHETAMINE SULFATE AND AMPHETAMINE SULFATE 5; 5; 5; 5 MG/1; MG/1; MG/1; MG/1
TABLET ORAL
Qty: 60 TABLET | Refills: 0 | Status: SHIPPED | OUTPATIENT
Start: 2023-03-23 | End: 2023-04-17

## 2023-01-23 RX ORDER — DEXTROAMPHETAMINE SACCHARATE, AMPHETAMINE ASPARTATE, DEXTROAMPHETAMINE SULFATE AND AMPHETAMINE SULFATE 5; 5; 5; 5 MG/1; MG/1; MG/1; MG/1
TABLET ORAL
Qty: 60 TABLET | Refills: 0 | Status: SHIPPED | OUTPATIENT
Start: 2023-01-23 | End: 2023-04-13

## 2023-01-23 RX ORDER — DEXTROAMPHETAMINE SACCHARATE, AMPHETAMINE ASPARTATE MONOHYDRATE, DEXTROAMPHETAMINE SULFATE AND AMPHETAMINE SULFATE 7.5; 7.5; 7.5; 7.5 MG/1; MG/1; MG/1; MG/1
30 CAPSULE, EXTENDED RELEASE ORAL DAILY
Qty: 30 CAPSULE | Refills: 0 | Status: SHIPPED | OUTPATIENT
Start: 2023-03-23 | End: 2023-05-25

## 2023-01-23 RX ORDER — VITAMIN B COMPLEX
1 CAPSULE ORAL
Status: CANCELLED | OUTPATIENT
Start: 2023-01-23

## 2023-01-23 RX ORDER — PROCHLORPERAZINE MALEATE 5 MG
5 TABLET ORAL EVERY 8 HOURS PRN
Qty: 90 TABLET | Refills: 3 | Status: SHIPPED | OUTPATIENT
Start: 2023-01-23

## 2023-01-23 RX ORDER — DEXTROAMPHETAMINE SACCHARATE, AMPHETAMINE ASPARTATE MONOHYDRATE, DEXTROAMPHETAMINE SULFATE AND AMPHETAMINE SULFATE 7.5; 7.5; 7.5; 7.5 MG/1; MG/1; MG/1; MG/1
30 CAPSULE, EXTENDED RELEASE ORAL DAILY
Qty: 30 CAPSULE | Refills: 0 | Status: SHIPPED | OUTPATIENT
Start: 2023-01-23 | End: 2023-04-13

## 2023-01-23 ASSESSMENT — PAIN SCALES - GENERAL: PAINLEVEL: NO PAIN (0)

## 2023-01-23 NOTE — PROGRESS NOTES
Assessment & Plan     Attention deficit hyperactivity disorder (ADHD), unspecified ADHD type  Refilled medicine for the next 3 months.  No side effect with his current dose, been doing well  Has gain weight.  - amphetamine-dextroamphetamine (ADDERALL XR) 30 MG 24 hr capsule; Take 1 capsule (30 mg) by mouth daily  - amphetamine-dextroamphetamine (ADDERALL) 20 MG tablet; 2 tab at noon.  - amphetamine-dextroamphetamine (ADDERALL XR) 30 MG 24 hr capsule; Take 1 capsule (30 mg) by mouth daily  - amphetamine-dextroamphetamine (ADDERALL) 20 MG tablet; 2 tab at noon.  - amphetamine-dextroamphetamine (ADDERALL XR) 30 MG 24 hr capsule; Take 1 capsule (30 mg) by mouth daily  - amphetamine-dextroamphetamine (ADDERALL) 20 MG tablet; 2 tab at noon.    Acquired bilateral pes cavus    - diclofenac (VOLTAREN) 1 % topical gel; Apply 2 gm to knees and feet qid as needed    Peripheral polyneuropathy    - pregabalin (LYRICA) 300 MG capsule; One tab bid ( increased )    Restless legs syndrome    - rOPINIRole (REQUIP) 0.25 MG tablet; Take 1 tablet (0.25 mg) by mouth At Bedtime    Anemia in stage 4 chronic kidney disease (H)      Encounter for long-term (current) use of medications      Alcohol abuse, in remission      Cirrhosis of liver without ascites, unspecified hepatic cirrhosis type (H)      Need for COVID-19 vaccine  Given   - COVID-19 VACCINE BIVALENT BOOSTER 12+ (PFIZER)    Acquired dysmorphic toenail  Cut all toenails for hime    Because of his deformity, bilateral pes cavus, peripheral neuropathy, he is on a wheelchair he is unable to bend forward and cut his nails.  - DEBRIDEMENT OF NAILS, 6 OR MORE    I did fill his Lyrica, continue with Duloxetine  Continue with ropinirole for restless leg syndrome.    I did encourage the patient to follow-up with his pain management, in the past he was referred to [facility], pain clinic.  I did give him the phone number again asked him to call the clinic and set up an appointment.     BMI:  "  Estimated body mass index is 26.78 kg/m  as calculated from the following:    Height as of this encounter: 1.702 m (5' 7\").    Weight as of this encounter: 77.6 kg (171 lb).   Weight management plan: Discussed healthy diet and exercise guidelines    Work on weight loss  Regular exercise    Return in about 3 months (around 4/23/2023) for Follow up, in person.      David Verduzco is a 32 year old, presenting for the following health issues:  Recheck Medication  Patient is doing better, he did get the new scooter.    He continues to do hemodialysis 3 times per week.    He is been gaining weight.    He reports his toenails, been elongated and  curved, has been unable to cut his nails because of his lower extremity feet deformity, , and unable to bend forward, he does have neuropathy.    History of Present Illness       Reason for visit:  Health wellness check    He eats 0-1 servings of fruits and vegetables daily.He consumes 4 sweetened beverage(s) daily.He exercises with enough effort to increase his heart rate 10 to 19 minutes per day.  He exercises with enough effort to increase his heart rate 4 days per week.   He is taking medications regularly.         Review of Systems   Constitutional, HEENT, cardiovascular, pulmonary, GI, , musculoskeletal, neuro, skin, endocrine and psych systems are negative, except as otherwise noted.      Objective    There were no vitals taken for this visit.  There is no height or weight on file to calculate BMI.  Physical Exam   GENERAL: healthy, alert and no distress  SKIN: toe nails  PSYCH: mentation appears normal, affect normal/bright    Orders Placed This Encounter   Procedures     DEBRIDEMENT OF NAILS, 6 OR MORE     COVID-19 VACCINE BIVALENT BOOSTER 12+ (PFIZER)       Kole Gomez MD            "

## 2023-02-02 ENCOUNTER — DOCUMENTATION ONLY (OUTPATIENT)
Dept: GASTROENTEROLOGY | Facility: CLINIC | Age: 33
End: 2023-02-02
Payer: MEDICAID

## 2023-02-02 NOTE — PROGRESS NOTES
Called PT and left VM.     Called to remind patient of their upcoming appointment with our GI clinic, on 02/09/23 at 9:00 AM with Dr. Rayshawn Will. This appointment is scheduled as an in-person appt. Please arrive 15 minutes early to check in for your appointment. , if your appointment is virtual (video or telephone) you need to be in [place] for the visit. To reschedule or cancel patient to call [phone].      SK

## 2023-02-07 NOTE — PROGRESS NOTES
Called PT and he inquired about changing visit to Virtual. Will reach out to Dr. Will's team.      Called to remind patient of their upcoming appointment with our GI clinic, on 02/09/23 at 9:00 AM with Dr. Rayshawn Will. This appointment is scheduled as an in-person appt. Please arrive 15 minutes early to check in for your appointment. , if your appointment is virtual (video or telephone) you need to be in [place] for the visit. To reschedule or cancel patient to call [phone].        SK

## 2023-02-27 DIAGNOSIS — F90.9 ATTENTION DEFICIT HYPERACTIVITY DISORDER (ADHD), UNSPECIFIED ADHD TYPE: ICD-10-CM

## 2023-02-27 NOTE — TELEPHONE ENCOUNTER
Requested Prescriptions   Pending Prescriptions Disp Refills     amphetamine-dextroamphetamine (ADDERALL) 20 MG tablet 60 tablet 0     Si tab at noon.       There is no refill protocol information for this order        amphetamine-dextroamphetamine (ADDERALL XR) 30 MG 24 hr capsule 30 capsule 0     Sig: Take 1 capsule (30 mg) by mouth daily       There is no refill protocol information for this order          Last office visit: 2023 with prescribing provider:  Iva Gomez MD   Future Office Visit:      Stephanie Shaffer   [facility]

## 2023-02-28 RX ORDER — DEXTROAMPHETAMINE SACCHARATE, AMPHETAMINE ASPARTATE, DEXTROAMPHETAMINE SULFATE AND AMPHETAMINE SULFATE 5; 5; 5; 5 MG/1; MG/1; MG/1; MG/1
TABLET ORAL
Qty: 60 TABLET | Refills: 0 | OUTPATIENT
Start: 2023-02-28

## 2023-02-28 RX ORDER — DEXTROAMPHETAMINE SACCHARATE, AMPHETAMINE ASPARTATE MONOHYDRATE, DEXTROAMPHETAMINE SULFATE AND AMPHETAMINE SULFATE 7.5; 7.5; 7.5; 7.5 MG/1; MG/1; MG/1; MG/1
30 CAPSULE, EXTENDED RELEASE ORAL DAILY
Qty: 30 CAPSULE | Refills: 0 | OUTPATIENT
Start: 2023-02-28

## 2023-04-01 ENCOUNTER — HOSPITAL ENCOUNTER (EMERGENCY)
Facility: CLINIC | Age: 33
Discharge: HOME | End: 2023-04-01
Attending: EMERGENCY MEDICINE | Admitting: EMERGENCY MEDICINE
Payer: COMMERCIAL

## 2023-04-01 ENCOUNTER — APPOINTMENT (OUTPATIENT)
Dept: GENERAL RADIOLOGY | Facility: CLINIC | Age: 33
End: 2023-04-01
Attending: EMERGENCY MEDICINE
Payer: COMMERCIAL

## 2023-04-01 VITALS
DIASTOLIC BLOOD PRESSURE: 69 MMHG | HEART RATE: 89 BPM | RESPIRATION RATE: 16 BRPM | OXYGEN SATURATION: 97 % | SYSTOLIC BLOOD PRESSURE: 116 MMHG | TEMPERATURE: 98.5 F

## 2023-04-01 DIAGNOSIS — N18.6 END STAGE RENAL FAILURE ON DIALYSIS (H): ICD-10-CM

## 2023-04-01 DIAGNOSIS — G89.29 OTHER CHRONIC PAIN: ICD-10-CM

## 2023-04-01 DIAGNOSIS — R06.02 SHORTNESS OF BREATH: ICD-10-CM

## 2023-04-01 DIAGNOSIS — Z99.2 END STAGE RENAL FAILURE ON DIALYSIS (H): ICD-10-CM

## 2023-04-01 LAB
ALBUMIN SERPL BCG-MCNC: 4.4 G/DL (ref 3.5–5.2)
ALP SERPL-CCNC: 111 U/L (ref 40–129)
ALT SERPL W P-5'-P-CCNC: 26 U/L (ref 10–50)
ANION GAP SERPL CALCULATED.3IONS-SCNC: 12 MMOL/L (ref 7–15)
AST SERPL W P-5'-P-CCNC: 22 U/L (ref 10–50)
ATRIAL RATE - MUSE: 84 BPM
BASOPHILS # BLD AUTO: 0.1 10E3/UL (ref 0–0.2)
BASOPHILS NFR BLD AUTO: 2 %
BILIRUB SERPL-MCNC: 0.5 MG/DL
BUN SERPL-MCNC: 35.5 MG/DL (ref 6–20)
CALCIUM SERPL-MCNC: 9.7 MG/DL (ref 8.6–10)
CHLORIDE SERPL-SCNC: 104 MMOL/L (ref 98–107)
CREAT SERPL-MCNC: 3.23 MG/DL (ref 0.67–1.17)
DEPRECATED HCO3 PLAS-SCNC: 22 MMOL/L (ref 22–29)
DIASTOLIC BLOOD PRESSURE - MUSE: NORMAL MMHG
EOSINOPHIL # BLD AUTO: 0.4 10E3/UL (ref 0–0.7)
EOSINOPHIL NFR BLD AUTO: 7 %
ERYTHROCYTE [DISTWIDTH] IN BLOOD BY AUTOMATED COUNT: 14 % (ref 10–15)
GFR SERPL CREATININE-BSD FRML MDRD: 25 ML/MIN/1.73M2
GLUCOSE SERPL-MCNC: 91 MG/DL (ref 70–99)
HCT VFR BLD AUTO: 39.2 % (ref 40–53)
HGB BLD-MCNC: 13.1 G/DL (ref 13.3–17.7)
IMM GRANULOCYTES # BLD: 0 10E3/UL
IMM GRANULOCYTES NFR BLD: 1 %
INTERPRETATION ECG - MUSE: NORMAL
LIPASE SERPL-CCNC: 30 U/L (ref 13–60)
LYMPHOCYTES # BLD AUTO: 0.9 10E3/UL (ref 0.8–5.3)
LYMPHOCYTES NFR BLD AUTO: 15 %
MCH RBC QN AUTO: 29.8 PG (ref 26.5–33)
MCHC RBC AUTO-ENTMCNC: 33.4 G/DL (ref 31.5–36.5)
MCV RBC AUTO: 89 FL (ref 78–100)
MONOCYTES # BLD AUTO: 0.5 10E3/UL (ref 0–1.3)
MONOCYTES NFR BLD AUTO: 8 %
NEUTROPHILS # BLD AUTO: 3.9 10E3/UL (ref 1.6–8.3)
NEUTROPHILS NFR BLD AUTO: 67 %
NRBC # BLD AUTO: 0 10E3/UL
NRBC BLD AUTO-RTO: 0 /100
P AXIS - MUSE: 27 DEGREES
PLATELET # BLD AUTO: 185 10E3/UL (ref 150–450)
POTASSIUM SERPL-SCNC: 4 MMOL/L (ref 3.4–5.3)
PR INTERVAL - MUSE: 160 MS
PROT SERPL-MCNC: 7.5 G/DL (ref 6.4–8.3)
QRS DURATION - MUSE: 102 MS
QT - MUSE: 390 MS
QTC - MUSE: 460 MS
R AXIS - MUSE: -18 DEGREES
RBC # BLD AUTO: 4.4 10E6/UL (ref 4.4–5.9)
SODIUM SERPL-SCNC: 138 MMOL/L (ref 136–145)
SYSTOLIC BLOOD PRESSURE - MUSE: NORMAL MMHG
T AXIS - MUSE: 55 DEGREES
VENTRICULAR RATE- MUSE: 84 BPM
WBC # BLD AUTO: 5.8 10E3/UL (ref 4–11)

## 2023-04-01 PROCEDURE — 93010 ELECTROCARDIOGRAM REPORT: CPT | Performed by: EMERGENCY MEDICINE

## 2023-04-01 PROCEDURE — 36415 COLL VENOUS BLD VENIPUNCTURE: CPT | Performed by: EMERGENCY MEDICINE

## 2023-04-01 PROCEDURE — 93005 ELECTROCARDIOGRAM TRACING: CPT | Performed by: EMERGENCY MEDICINE

## 2023-04-01 PROCEDURE — 71045 X-RAY EXAM CHEST 1 VIEW: CPT | Mod: 26 | Performed by: RADIOLOGY

## 2023-04-01 PROCEDURE — 96375 TX/PRO/DX INJ NEW DRUG ADDON: CPT | Performed by: EMERGENCY MEDICINE

## 2023-04-01 PROCEDURE — 250N000013 HC RX MED GY IP 250 OP 250 PS 637: Performed by: EMERGENCY MEDICINE

## 2023-04-01 PROCEDURE — 99285 EMERGENCY DEPT VISIT HI MDM: CPT | Mod: 25 | Performed by: EMERGENCY MEDICINE

## 2023-04-01 PROCEDURE — 250N000011 HC RX IP 250 OP 636: Performed by: EMERGENCY MEDICINE

## 2023-04-01 PROCEDURE — 71045 X-RAY EXAM CHEST 1 VIEW: CPT

## 2023-04-01 PROCEDURE — 80053 COMPREHEN METABOLIC PANEL: CPT | Performed by: EMERGENCY MEDICINE

## 2023-04-01 PROCEDURE — 83690 ASSAY OF LIPASE: CPT | Performed by: EMERGENCY MEDICINE

## 2023-04-01 PROCEDURE — 96374 THER/PROPH/DIAG INJ IV PUSH: CPT | Performed by: EMERGENCY MEDICINE

## 2023-04-01 PROCEDURE — 85025 COMPLETE CBC W/AUTO DIFF WBC: CPT | Performed by: EMERGENCY MEDICINE

## 2023-04-01 RX ORDER — HYDROXYZINE HYDROCHLORIDE 50 MG/1
50 TABLET, FILM COATED ORAL ONCE
Status: COMPLETED | OUTPATIENT
Start: 2023-04-01 | End: 2023-04-01

## 2023-04-01 RX ORDER — ONDANSETRON 2 MG/ML
8 INJECTION INTRAMUSCULAR; INTRAVENOUS ONCE
Status: COMPLETED | OUTPATIENT
Start: 2023-04-01 | End: 2023-04-01

## 2023-04-01 RX ORDER — HYDROCODONE BITARTRATE AND ACETAMINOPHEN 5; 325 MG/1; MG/1
1 TABLET ORAL EVERY 6 HOURS PRN
Qty: 15 TABLET | Refills: 0 | Status: SHIPPED | OUTPATIENT
Start: 2023-04-01 | End: 2023-04-17

## 2023-04-01 RX ORDER — MORPHINE SULFATE 4 MG/ML
4 INJECTION, SOLUTION INTRAMUSCULAR; INTRAVENOUS ONCE
Status: COMPLETED | OUTPATIENT
Start: 2023-04-01 | End: 2023-04-01

## 2023-04-01 RX ADMIN — HYDROXYZINE HYDROCHLORIDE 50 MG: 50 TABLET ORAL at 16:19

## 2023-04-01 RX ADMIN — ONDANSETRON 8 MG: 2 INJECTION INTRAMUSCULAR; INTRAVENOUS at 16:19

## 2023-04-01 RX ADMIN — MORPHINE SULFATE 4 MG: 4 INJECTION INTRAVENOUS at 16:22

## 2023-04-01 ASSESSMENT — ACTIVITIES OF DAILY LIVING (ADL): ADLS_ACUITY_SCORE: 35

## 2023-04-01 NOTE — ED TRIAGE NOTES
Patient BIBA for missing his dialysis appointment due to the weather last night. Patient was last dialysized Tuesday. Patient is complaining of vomiting and shortness of breath.

## 2023-04-01 NOTE — DISCHARGE INSTRUCTIONS
Please call your dialysis center tomorrow or Monday to schedule an additional run before your regularly scheduled run on Tuesday.  Please follow-up with your primary care provider regarding the chronic pain issues.  I do recommend that you obtain a pain provider to help with your pain.  For now, I prescribed a small amount of Norco/Lortab for temporary relief.  We do not refill these prescriptions from the emergency department.    Please return to the emergency department if you have any worsening of your shortness of breath, any chest pain, return of vomiting, any other concerns.
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
## 2023-04-11 ENCOUNTER — TELEPHONE (OUTPATIENT)
Dept: FAMILY MEDICINE | Facility: CLINIC | Age: 33
End: 2023-04-11
Payer: MEDICAID

## 2023-04-11 DIAGNOSIS — G62.9 PERIPHERAL POLYNEUROPATHY: ICD-10-CM

## 2023-04-11 DIAGNOSIS — F90.9 ATTENTION DEFICIT HYPERACTIVITY DISORDER (ADHD), UNSPECIFIED ADHD TYPE: ICD-10-CM

## 2023-04-11 NOTE — TELEPHONE ENCOUNTER
Routing to provider- FYI/ pain med request.    Pt calling stating he needs help with pain management, medication refill, and forms signed.     RN relayed pt needs to follow-up with pain clinic for pain management as this was advised in previous visits. Pt states he was playing phone tag with them and gave up. Pt had recent ER visit where he was prescribed 15 tabs of Norco- pt looking for provider to refill to next appointment (4/17)     Pt reports it is time for refill of addreall- RN relayed pt will see provider before this refill and can discuss at appointment. Pt believes he is almost out of his lyrica- RN relayed refills are on file.     Pt relays his PCA has not been getting paid by current company so pt has forms to be signed for new company- RN relayed forms can be faxed or brought in at appointment    Patient verbalized understanding and in agreement with plan of care.     Valeria Antunez RN

## 2023-04-13 RX ORDER — PREGABALIN 300 MG/1
CAPSULE ORAL
Qty: 180 CAPSULE | Refills: 3 | Status: SHIPPED | OUTPATIENT
Start: 2023-04-13 | End: 2023-11-29

## 2023-04-13 RX ORDER — HYDROCODONE BITARTRATE AND ACETAMINOPHEN 5; 325 MG/1; MG/1
1 TABLET ORAL EVERY 6 HOURS PRN
Qty: 15 TABLET | Refills: 0 | Status: CANCELLED | OUTPATIENT
Start: 2023-04-13

## 2023-04-13 RX ORDER — DEXTROAMPHETAMINE SACCHARATE, AMPHETAMINE ASPARTATE, DEXTROAMPHETAMINE SULFATE AND AMPHETAMINE SULFATE 5; 5; 5; 5 MG/1; MG/1; MG/1; MG/1
TABLET ORAL
Qty: 60 TABLET | Refills: 0 | Status: SHIPPED | OUTPATIENT
Start: 2023-04-13 | End: 2023-04-17

## 2023-04-13 RX ORDER — DEXTROAMPHETAMINE SACCHARATE, AMPHETAMINE ASPARTATE MONOHYDRATE, DEXTROAMPHETAMINE SULFATE AND AMPHETAMINE SULFATE 7.5; 7.5; 7.5; 7.5 MG/1; MG/1; MG/1; MG/1
30 CAPSULE, EXTENDED RELEASE ORAL DAILY
Qty: 30 CAPSULE | Refills: 0 | Status: SHIPPED | OUTPATIENT
Start: 2023-04-13 | End: 2023-04-17

## 2023-04-13 NOTE — TELEPHONE ENCOUNTER
I have sent a prescription / refill for his Adderall, Lyrica.  In terms of his pain medication, opioid, narcotic.  This is an issue I discussed with him in the past, and I told him multiple times this medicine I will not fill.    Patient should not ask our clinic anymore.  He is already taking too many other medication.  He has been referred many times to go to the pain clinic and he need to follow-up with the pain clinic.

## 2023-04-13 NOTE — TELEPHONE ENCOUNTER
Patient is follow up stating that he has not received a response.   He is wondering if he can get medication refills from Dr. Gomez and if this can be addressed before the end of day because he has a PCA tonight that can help him  meds and won't be back till Tuesday.  He is out of Adderall 30 mg capsule and 20 mg tablet.  He is out of [place]. Wondering if he can get a refill. This came from the ED.  Pt unclear about Lyrica and if he is supposed to be taking this or not and if he can take with Norco or not if this gets refilled.    Pt states that he has an upcoming appt with Dr. Gomez for 04/17/2023. He has dialysis Tuesday. His PCA is there tonight and then won't be back until Tuesday.     Mago Barnett RN   [facility] [place]

## 2023-04-14 NOTE — TELEPHONE ENCOUNTER
See Dr. Gomez's message below regarding refills and pain management.    Called patient, no answer, left message on voicemail to call [facility] at [phone].    Rosa Elena Starks RN

## 2023-04-17 ENCOUNTER — OFFICE VISIT (OUTPATIENT)
Dept: FAMILY MEDICINE | Facility: CLINIC | Age: 33
End: 2023-04-17
Payer: COMMERCIAL

## 2023-04-17 VITALS
HEIGHT: 67 IN | RESPIRATION RATE: 26 BRPM | DIASTOLIC BLOOD PRESSURE: 91 MMHG | BODY MASS INDEX: 27.94 KG/M2 | OXYGEN SATURATION: 99 % | WEIGHT: 178 LBS | SYSTOLIC BLOOD PRESSURE: 136 MMHG | TEMPERATURE: 97.4 F | HEART RATE: 102 BPM

## 2023-04-17 DIAGNOSIS — F90.9 ATTENTION DEFICIT HYPERACTIVITY DISORDER (ADHD), UNSPECIFIED ADHD TYPE: ICD-10-CM

## 2023-04-17 DIAGNOSIS — R53.81 PHYSICAL DECONDITIONING: ICD-10-CM

## 2023-04-17 DIAGNOSIS — F41.1 GAD (GENERALIZED ANXIETY DISORDER): ICD-10-CM

## 2023-04-17 DIAGNOSIS — M24.576 CONTRACTURE OF JOINT OF FOOT, UNSPECIFIED LATERALITY: Primary | ICD-10-CM

## 2023-04-17 DIAGNOSIS — K86.89 PANCREATIC INSUFFICIENCY: ICD-10-CM

## 2023-04-17 DIAGNOSIS — F32.0 CURRENT MILD EPISODE OF MAJOR DEPRESSIVE DISORDER, UNSPECIFIED WHETHER RECURRENT (H): ICD-10-CM

## 2023-04-17 DIAGNOSIS — M21.6X1 ACQUIRED BILATERAL PES CAVUS: ICD-10-CM

## 2023-04-17 DIAGNOSIS — F10.11 ALCOHOL ABUSE, IN REMISSION: ICD-10-CM

## 2023-04-17 DIAGNOSIS — M21.6X2 ACQUIRED BILATERAL PES CAVUS: ICD-10-CM

## 2023-04-17 PROCEDURE — 99215 OFFICE O/P EST HI 40 MIN: CPT | Performed by: FAMILY MEDICINE

## 2023-04-17 RX ORDER — DEXTROAMPHETAMINE SACCHARATE, AMPHETAMINE ASPARTATE MONOHYDRATE, DEXTROAMPHETAMINE SULFATE AND AMPHETAMINE SULFATE 7.5; 7.5; 7.5; 7.5 MG/1; MG/1; MG/1; MG/1
30 CAPSULE, EXTENDED RELEASE ORAL DAILY
Qty: 30 CAPSULE | Refills: 0 | Status: SHIPPED | OUTPATIENT
Start: 2023-06-18 | End: 2023-07-18

## 2023-04-17 RX ORDER — DEXTROAMPHETAMINE SACCHARATE, AMPHETAMINE ASPARTATE MONOHYDRATE, DEXTROAMPHETAMINE SULFATE AND AMPHETAMINE SULFATE 7.5; 7.5; 7.5; 7.5 MG/1; MG/1; MG/1; MG/1
30 CAPSULE, EXTENDED RELEASE ORAL DAILY
Qty: 30 CAPSULE | Refills: 0 | Status: SHIPPED | OUTPATIENT
Start: 2023-04-17 | End: 2023-05-17

## 2023-04-17 RX ORDER — DEXTROAMPHETAMINE SACCHARATE, AMPHETAMINE ASPARTATE MONOHYDRATE, DEXTROAMPHETAMINE SULFATE AND AMPHETAMINE SULFATE 7.5; 7.5; 7.5; 7.5 MG/1; MG/1; MG/1; MG/1
30 CAPSULE, EXTENDED RELEASE ORAL DAILY
Qty: 30 CAPSULE | Refills: 0 | Status: SHIPPED | OUTPATIENT
Start: 2023-05-18 | End: 2023-06-17

## 2023-04-17 RX ORDER — DEXTROAMPHETAMINE SACCHARATE, AMPHETAMINE ASPARTATE, DEXTROAMPHETAMINE SULFATE AND AMPHETAMINE SULFATE 5; 5; 5; 5 MG/1; MG/1; MG/1; MG/1
TABLET ORAL
Qty: 60 TABLET | Refills: 0 | Status: SHIPPED | OUTPATIENT
Start: 2023-06-16 | End: 2023-10-11

## 2023-04-17 RX ORDER — NORTRIPTYLINE HCL 25 MG
25 CAPSULE ORAL AT BEDTIME
Qty: 90 CAPSULE | Refills: 3 | Status: SHIPPED | OUTPATIENT
Start: 2023-04-17 | End: 2024-02-26

## 2023-04-17 RX ORDER — ALPRAZOLAM 0.5 MG
0.5 TABLET ORAL DAILY PRN
Qty: 30 TABLET | Refills: 2 | Status: SHIPPED | OUTPATIENT
Start: 2023-04-17 | End: 2024-04-02

## 2023-04-17 RX ORDER — DEXTROAMPHETAMINE SACCHARATE, AMPHETAMINE ASPARTATE, DEXTROAMPHETAMINE SULFATE AND AMPHETAMINE SULFATE 5; 5; 5; 5 MG/1; MG/1; MG/1; MG/1
TABLET ORAL
Qty: 60 TABLET | Refills: 0 | Status: SHIPPED | OUTPATIENT
Start: 2023-07-17 | End: 2023-09-28

## 2023-04-17 RX ORDER — DEXTROAMPHETAMINE SACCHARATE, AMPHETAMINE ASPARTATE, DEXTROAMPHETAMINE SULFATE AND AMPHETAMINE SULFATE 5; 5; 5; 5 MG/1; MG/1; MG/1; MG/1
TABLET ORAL
Qty: 60 TABLET | Refills: 0 | Status: SHIPPED | OUTPATIENT
Start: 2023-05-12 | End: 2023-10-11

## 2023-04-17 ASSESSMENT — ANXIETY QUESTIONNAIRES
8. IF YOU CHECKED OFF ANY PROBLEMS, HOW DIFFICULT HAVE THESE MADE IT FOR YOU TO DO YOUR WORK, TAKE CARE OF THINGS AT HOME, OR GET ALONG WITH OTHER PEOPLE?: EXTREMELY DIFFICULT
1. FEELING NERVOUS, ANXIOUS, OR ON EDGE: NEARLY EVERY DAY
5. BEING SO RESTLESS THAT IT IS HARD TO SIT STILL: NEARLY EVERY DAY
7. FEELING AFRAID AS IF SOMETHING AWFUL MIGHT HAPPEN: NEARLY EVERY DAY
2. NOT BEING ABLE TO STOP OR CONTROL WORRYING: NEARLY EVERY DAY
3. WORRYING TOO MUCH ABOUT DIFFERENT THINGS: NEARLY EVERY DAY
4. TROUBLE RELAXING: NEARLY EVERY DAY
7. FEELING AFRAID AS IF SOMETHING AWFUL MIGHT HAPPEN: NEARLY EVERY DAY
GAD7 TOTAL SCORE: 21
GAD7 TOTAL SCORE: 21
IF YOU CHECKED OFF ANY PROBLEMS ON THIS QUESTIONNAIRE, HOW DIFFICULT HAVE THESE PROBLEMS MADE IT FOR YOU TO DO YOUR WORK, TAKE CARE OF THINGS AT HOME, OR GET ALONG WITH OTHER PEOPLE: EXTREMELY DIFFICULT
GAD7 TOTAL SCORE: 21
6. BECOMING EASILY ANNOYED OR IRRITABLE: NEARLY EVERY DAY

## 2023-04-17 ASSESSMENT — PAIN SCALES - GENERAL: PAINLEVEL: SEVERE PAIN (6)

## 2023-04-17 NOTE — PROGRESS NOTES
Assessment & Plan     Contracture of joint of foot, unspecified laterality  Patient in the past was seen by podiatry however he never followed up with them.  In addition, he was referred to pain management and he did not follow up with pain management   Appointment.    Today, he comes in with a   she reports she is currently managing his appointment and she will make appointment for him.  I did refer him back to the pain management.  In addition, I gave him the phone number for his podiatry to call and make an appointment.    Patient currently does have a PCA however he reports his PCA has not officially been paid, he reports he does require more hours and need more help  He is wheelchair-bound.  He does not walk or has limited walking.     - Home Care Referral  - Pain Management  Referral; Future  - nortriptyline (PAMELOR) 25 MG capsule; Take 1 capsule (25 mg) by mouth At Bedtime    Acquired bilateral pes cavus    - Home Care Referral  - Pain Management  Referral; Future  - nortriptyline (PAMELOR) 25 MG capsule; Take 1 capsule (25 mg) by mouth At Bedtime    Alcohol abuse, in remission  In remission.    DIMITRI (generalized anxiety disorder)  He is tried in the past beta-blocker, in addition hydroxyzine did not help.  I did give him a low-dose of Xanax to use 1 tablet at nighttime as needed.  - nortriptyline (PAMELOR) 25 MG capsule; Take 1 capsule (25 mg) by mouth At Bedtime  - naloxone (NARCAN) 4 MG/0.1ML nasal spray; Spray 1 spray (4 mg) into one nostril alternating nostrils as needed for opioid reversal every 2-3 minutes until assistance arrives  - Adult Mental Health  Referral; Future  - ALPRAZolam (XANAX) 0.5 MG tablet; Take 1 tablet (0.5 mg) by mouth daily as needed for anxiety    Pancreatic insufficiency    - Pain Management  Referral; Future    Attention deficit hyperactivity disorder (ADHD), unspecified ADHD type  Refilled his medications.  -  amphetamine-dextroamphetamine (ADDERALL XR) 30 MG 24 hr capsule; Take 1 capsule (30 mg) by mouth daily for 30 days  - amphetamine-dextroamphetamine (ADDERALL XR) 30 MG 24 hr capsule; Take 1 capsule (30 mg) by mouth daily for 30 days  - amphetamine-dextroamphetamine (ADDERALL XR) 30 MG 24 hr capsule; Take 1 capsule (30 mg) by mouth daily for 30 days  - amphetamine-dextroamphetamine (ADDERALL) 20 MG tablet; 2 tab at noon  - amphetamine-dextroamphetamine (ADDERALL) 20 MG tablet; 2 tab at noon  - amphetamine-dextroamphetamine (ADDERALL) 20 MG tablet; 2 tab tab at noon    Follow up in 3 months.    Current mild episode of major depressive disorder, unspecified whether recurrent (H)  Continue with Duloxetine  Referred to Mental health.  - nortriptyline (PAMELOR) 25 MG capsule; Take 1 capsule (25 mg) by mouth At Bedtime    Physical deconditioning  Wheelchair bound  Referred to Home care.  Need more PCA hours.    Over 45 minutes spent reviewing chart, reviewing test results, talking with and examining patient, formulating plan, and documentation on the day of the encounter.    Follow up in 3 months,    David Verduzco is a 32 year old, presenting for the following health issues:  Chronic Disease Management (pain) and Forms  Patient with end-stage kidney disease, on hemodialysis 3 times per week.  He is wheel chair bound.  He does have chronic pain in his feet, his back.  In the past he was referred many times to pain management however he never went.  He denies suicidal thoughts or ideation denies alcohol abuse.  He does report his generalized anxiety disorder is getting worse and the previous medications did not work for him.        4/17/2023     2:45 PM   Additional Questions   Roomed by Yomaira Edouard CMA   Accompanied by Ms. Farias: CSS worker     Forms 4/17/2023   Any forms needing to be completed Yes         4/17/2023     2:45 PM   Patient Reported Additional Medications   Patient reports taking the following new  "medications No     History of Present Illness       Back Pain:  He presents for follow up of back pain. Patient's back pain is a chronic problem.  Location of back pain:  Right lower back, left lower back, right middle of back, left middle of back, right hip and right side of waist  Description of back pain: gnawing, shooting and stabbing  Back pain spreads: nowhere    Since patient first noticed back pain, pain is: always present, but gets better and worse  Does back pain interfere with his job:  Yes  Has the patient tried any new treatments:  No       CKD: He is not using over the counter pain medicine.     Mental Health Follow-up:  Patient presents to follow-up on Anxiety.    Patient's anxiety since last visit has been:  Worse  The patient is having other symptoms associated with anxiety.  Any significant life events: No  Patient is feeling anxious or having panic attacks.  Patient has no concerns about alcohol or drug use.    Headaches:   Since the patient's last clinic visit, headaches are: worsened  The patient is getting headaches:  Several times a week usually off and on for a few hours almost daily  He is not able to do normal daily activities when he has a migraine.  The patient is taking the following rescue/relief medications:  Tylenol   Patient states \"I get only a small amount of relief\" from the rescue/relief medications.   The patient is taking the following medications to prevent migraines:  No medications to prevent migraines  In the past 4 weeks, the patient has gone to an Urgent Care or Emergency Room 0 times times due to headaches.    Reason for visit:  Assessment required,pain management, and other disability issues    He eats 0-1 servings of fruits and vegetables daily.He consumes 3 sweetened beverage(s) daily.He exercises with enough effort to increase his heart rate 20 to 29 minutes per day.  He exercises with enough effort to increase his heart rate 3 or less days per week.   He is taking " "medications regularly.  Today's DIMITRI-7 Score: 21       Review of Systems   Constitutional, HEENT, cardiovascular, pulmonary, GI, , musculoskeletal, neuro, skin, endocrine and psych systems are negative, except as otherwise noted.      Objective    BP (!) 136/91 (BP Location: Right arm, Patient Position: Chair, Cuff Size: Adult Regular)   Pulse 102   Temp 97.4  F (36.3  C) (Tympanic)   Resp 26   Ht 1.702 m (5' 7\")   Wt 80.7 kg (178 lb)   SpO2 99%   BMI 27.88 kg/m    Body mass index is 27.88 kg/m .  Physical Exam   GENERAL: healthy, alert and no distress  NEURO: Normal strength and tone, mentation intact and speech normal  PSYCH: mentation appears normal, affect normal/bright    Orders Placed This Encounter   Procedures     Home Care Referral     Pain Management  Referral     Adult Mental Health  Referral       Kole Gomez MD            "

## 2023-04-18 ENCOUNTER — TELEPHONE (OUTPATIENT)
Dept: FAMILY MEDICINE | Facility: CLINIC | Age: 33
End: 2023-04-18
Payer: MEDICAID

## 2023-04-18 NOTE — TELEPHONE ENCOUNTER
Prior Authorization Retail Medication Request    Medication/Dose: naloxone (NARCAN) 4 MG/0.1ML nasal spray  ICD code (if different than what is on RX):  na  Previously Tried and Failed:  na  Rationale:  na    Insurance Name:  na  Insurance ID:  key: [id number]      Pharmacy Information (if different than what is on RX)  [facility]  Phone:  [facility]    A Prior Authorization has been started.  To submit the PA, please follow the instructions below:    [email or web address]  KEY: [id number]

## 2023-04-20 NOTE — TELEPHONE ENCOUNTER
Central Prior Authorization Team   Phone: [phone]    PA Initiation    Medication: naloxone (NARCAN) 4 MG/0.1ML nasal spray  Insurance Company: [employer] - Phone [phone] Fax [phone]  Pharmacy Filling the Rx: [id number] - [place] [address] [address] [place] [address]  Filling Pharmacy Phone: [phone]  Filling Pharmacy Fax:    Start Date: 4/20/2023

## 2023-04-21 NOTE — TELEPHONE ENCOUNTER
Prior Authorization Not Needed per Insurance    Medication: naloxone (NARCAN) 4 MG/0.1ML nasal spray  Insurance Company: [employer] - Phone [phone] Fax [phone]  Expected CoPay:      Pharm[facility]illing the Rx: [id number] [place] [address] [facility] [address] [place] [address]  Pharmacy Notified: Yes  Patient Notified: Yes  **Instructed pharmacy to notify patient when script is ready to /ship.**    Insurance prefers the brand Narcan.

## 2023-05-02 ENCOUNTER — TELEPHONE (OUTPATIENT)
Dept: FAMILY MEDICINE | Facility: CLINIC | Age: 33
End: 2023-05-02
Payer: MEDICAID

## 2023-05-02 NOTE — TELEPHONE ENCOUNTER
Patient calling wondering about all his information being lost between different health providers and systems. He should reach out to his  to assist him in this.     He was also wanting his Kidney provider to speak with PCP.  Recommend that he have his Nephrologist call the clinic and set up a Provider to Provider consult if they have questions for Dr Gomez.    Pt will have them reach out to our office.    Pt also wanted number for Pain clinic for referral that was done on 4/17/23, given.        Brianna Bradley RN

## 2023-05-19 ENCOUNTER — TELEPHONE (OUTPATIENT)
Dept: FAMILY MEDICINE | Facility: CLINIC | Age: 33
End: 2023-05-19
Payer: MEDICAID

## 2023-05-19 NOTE — TELEPHONE ENCOUNTER
Medication Question or Refill    Contacts       Type Contact Phone/Fax    05/19/2023 05:02 PM CDT Phone (Incoming) Dax Villareal (Self) [phone] (M)          What medication are you calling about (include dose and sig)?: amphetamine-dextroamphetamine (ADDERALL XR) 30 MG 24 hr capsule & Blood Pressure Medication    Preferred Pharmacy:   [id number] - [address] [address]  Phone: [phone] Fax: [phone]      Controlled Substance Agreement on file:   CSA -- Patient Level:    CSA: None found at the patient level.       Who prescribed the medication?: PCP - Taha    Do you need a refill? Yes    When did you use the medication last? Daily    Patient offered an appointment? No    Do you have any questions or concerns?  No      Could we send this information to you in [facility] or would you prefer to receive a phone call?:   Patient would prefer a phone call   Okay to leave a detailed message?: Yes at Cell number on file:    Telephone Information:   Mobile [phone]

## 2023-05-22 ENCOUNTER — HOSPITAL ENCOUNTER (EMERGENCY)
Facility: CLINIC | Age: 33
Discharge: HOME | End: 2023-05-22
Attending: EMERGENCY MEDICINE | Admitting: EMERGENCY MEDICINE
Payer: COMMERCIAL

## 2023-05-22 ENCOUNTER — APPOINTMENT (OUTPATIENT)
Dept: GENERAL RADIOLOGY | Facility: CLINIC | Age: 33
End: 2023-05-22
Attending: EMERGENCY MEDICINE
Payer: COMMERCIAL

## 2023-05-22 VITALS
OXYGEN SATURATION: 100 % | HEART RATE: 78 BPM | SYSTOLIC BLOOD PRESSURE: 105 MMHG | TEMPERATURE: 97.5 F | DIASTOLIC BLOOD PRESSURE: 65 MMHG | WEIGHT: 165.34 LBS | RESPIRATION RATE: 16 BRPM | HEIGHT: 67 IN | BODY MASS INDEX: 25.95 KG/M2

## 2023-05-22 DIAGNOSIS — F41.9 ANXIETY: ICD-10-CM

## 2023-05-22 DIAGNOSIS — N17.0 ACUTE KIDNEY FAILURE WITH TUBULAR NECROSIS (H): ICD-10-CM

## 2023-05-22 DIAGNOSIS — N18.6 ESRD ON HEMODIALYSIS (H): ICD-10-CM

## 2023-05-22 DIAGNOSIS — Z99.2 ESRD ON HEMODIALYSIS (H): ICD-10-CM

## 2023-05-22 DIAGNOSIS — R06.02 SHORTNESS OF BREATH: ICD-10-CM

## 2023-05-22 LAB
ALBUMIN SERPL BCG-MCNC: 4.7 G/DL (ref 3.5–5.2)
ALP SERPL-CCNC: 117 U/L (ref 40–129)
ALT SERPL W P-5'-P-CCNC: 31 U/L (ref 10–50)
ANION GAP SERPL CALCULATED.3IONS-SCNC: 15 MMOL/L (ref 7–15)
AST SERPL W P-5'-P-CCNC: 30 U/L (ref 10–50)
ATRIAL RATE - MUSE: 93 BPM
BASOPHILS # BLD AUTO: 0.1 10E3/UL (ref 0–0.2)
BASOPHILS NFR BLD AUTO: 2 %
BILIRUB DIRECT SERPL-MCNC: NORMAL MG/DL
BILIRUB SERPL-MCNC: 0.7 MG/DL
BUN SERPL-MCNC: 24.3 MG/DL (ref 6–20)
CA-I BLD-MCNC: 4.9 MG/DL (ref 4.4–5.2)
CALCIUM SERPL-MCNC: 10 MG/DL (ref 8.6–10)
CHLORIDE SERPL-SCNC: 98 MMOL/L (ref 98–107)
CPB POCT: NO
CREAT SERPL-MCNC: 3.11 MG/DL (ref 0.67–1.17)
DEPRECATED HCO3 PLAS-SCNC: 21 MMOL/L (ref 22–29)
DIASTOLIC BLOOD PRESSURE - MUSE: NORMAL MMHG
EOSINOPHIL # BLD AUTO: 0.3 10E3/UL (ref 0–0.7)
EOSINOPHIL NFR BLD AUTO: 5 %
ERYTHROCYTE [DISTWIDTH] IN BLOOD BY AUTOMATED COUNT: 13.1 % (ref 10–15)
GFR SERPL CREATININE-BSD FRML MDRD: 26 ML/MIN/1.73M2
GLUCOSE BLD-MCNC: 92 MG/DL (ref 70–99)
GLUCOSE SERPL-MCNC: 98 MG/DL (ref 70–99)
HCO3 BLDV-SCNC: 25 MMOL/L (ref 21–28)
HCT VFR BLD AUTO: 42 % (ref 40–53)
HCT VFR BLD CALC: 40 % (ref 40–53)
HGB BLD-MCNC: 13.6 G/DL (ref 13.3–17.7)
HGB BLD-MCNC: 14.6 G/DL (ref 13.3–17.7)
IMM GRANULOCYTES # BLD: 0 10E3/UL
IMM GRANULOCYTES NFR BLD: 0 %
INTERPRETATION ECG - MUSE: NORMAL
LIPASE SERPL-CCNC: 28 U/L (ref 13–60)
LYMPHOCYTES # BLD AUTO: 0.9 10E3/UL (ref 0.8–5.3)
LYMPHOCYTES NFR BLD AUTO: 19 %
MAGNESIUM SERPL-MCNC: 2.4 MG/DL (ref 1.7–2.3)
MCH RBC QN AUTO: 30 PG (ref 26.5–33)
MCHC RBC AUTO-ENTMCNC: 34.8 G/DL (ref 31.5–36.5)
MCV RBC AUTO: 86 FL (ref 78–100)
MONOCYTES # BLD AUTO: 0.3 10E3/UL (ref 0–1.3)
MONOCYTES NFR BLD AUTO: 6 %
NEUTROPHILS # BLD AUTO: 3.2 10E3/UL (ref 1.6–8.3)
NEUTROPHILS NFR BLD AUTO: 68 %
NRBC # BLD AUTO: 0 10E3/UL
NRBC BLD AUTO-RTO: 0 /100
P AXIS - MUSE: 24 DEGREES
PCO2 BLDV: 33 MM HG (ref 40–50)
PH BLDV: 7.48 [PH] (ref 7.32–7.43)
PHOSPHATE SERPL-MCNC: 2.6 MG/DL (ref 2.5–4.5)
PLATELET # BLD AUTO: 146 10E3/UL (ref 150–450)
PO2 BLDV: 58 MM HG (ref 25–47)
POTASSIUM BLD-SCNC: 3.4 MMOL/L (ref 3.4–5.3)
POTASSIUM SERPL-SCNC: 3.9 MMOL/L (ref 3.4–5.3)
PR INTERVAL - MUSE: 162 MS
PROT SERPL-MCNC: 7.7 G/DL (ref 6.4–8.3)
QRS DURATION - MUSE: 88 MS
QT - MUSE: 374 MS
QTC - MUSE: 465 MS
R AXIS - MUSE: -27 DEGREES
RBC # BLD AUTO: 4.87 10E6/UL (ref 4.4–5.9)
SAO2 % BLDV: 92 % (ref 94–100)
SODIUM BLD-SCNC: 136 MMOL/L (ref 133–144)
SODIUM SERPL-SCNC: 134 MMOL/L (ref 136–145)
SYSTOLIC BLOOD PRESSURE - MUSE: NORMAL MMHG
T AXIS - MUSE: 54 DEGREES
TROPONIN T SERPL HS-MCNC: 47 NG/L
TROPONIN T SERPL HS-MCNC: 50 NG/L
VENTRICULAR RATE- MUSE: 93 BPM
WBC # BLD AUTO: 4.8 10E3/UL (ref 4–11)

## 2023-05-22 PROCEDURE — 76604 US EXAM CHEST: CPT | Performed by: EMERGENCY MEDICINE

## 2023-05-22 PROCEDURE — 82310 ASSAY OF CALCIUM: CPT | Performed by: EMERGENCY MEDICINE

## 2023-05-22 PROCEDURE — 82947 ASSAY GLUCOSE BLOOD QUANT: CPT

## 2023-05-22 PROCEDURE — 71046 X-RAY EXAM CHEST 2 VIEWS: CPT | Mod: 26 | Performed by: RADIOLOGY

## 2023-05-22 PROCEDURE — 85025 COMPLETE CBC W/AUTO DIFF WBC: CPT | Performed by: EMERGENCY MEDICINE

## 2023-05-22 PROCEDURE — 250N000011 HC RX IP 250 OP 636: Performed by: EMERGENCY MEDICINE

## 2023-05-22 PROCEDURE — 84484 ASSAY OF TROPONIN QUANT: CPT | Performed by: EMERGENCY MEDICINE

## 2023-05-22 PROCEDURE — 36415 COLL VENOUS BLD VENIPUNCTURE: CPT | Performed by: EMERGENCY MEDICINE

## 2023-05-22 PROCEDURE — 83690 ASSAY OF LIPASE: CPT | Performed by: EMERGENCY MEDICINE

## 2023-05-22 PROCEDURE — 83735 ASSAY OF MAGNESIUM: CPT | Performed by: EMERGENCY MEDICINE

## 2023-05-22 PROCEDURE — 71046 X-RAY EXAM CHEST 2 VIEWS: CPT

## 2023-05-22 PROCEDURE — 93005 ELECTROCARDIOGRAM TRACING: CPT | Performed by: EMERGENCY MEDICINE

## 2023-05-22 PROCEDURE — 76604 US EXAM CHEST: CPT | Mod: 26 | Performed by: EMERGENCY MEDICINE

## 2023-05-22 PROCEDURE — 93308 TTE F-UP OR LMTD: CPT | Performed by: EMERGENCY MEDICINE

## 2023-05-22 PROCEDURE — 84100 ASSAY OF PHOSPHORUS: CPT | Performed by: EMERGENCY MEDICINE

## 2023-05-22 PROCEDURE — 93010 ELECTROCARDIOGRAM REPORT: CPT | Performed by: EMERGENCY MEDICINE

## 2023-05-22 PROCEDURE — 99285 EMERGENCY DEPT VISIT HI MDM: CPT | Mod: 25 | Performed by: EMERGENCY MEDICINE

## 2023-05-22 PROCEDURE — 93308 TTE F-UP OR LMTD: CPT | Mod: 26 | Performed by: EMERGENCY MEDICINE

## 2023-05-22 PROCEDURE — 82803 BLOOD GASES ANY COMBINATION: CPT

## 2023-05-22 PROCEDURE — 250N000013 HC RX MED GY IP 250 OP 250 PS 637: Performed by: EMERGENCY MEDICINE

## 2023-05-22 PROCEDURE — 96375 TX/PRO/DX INJ NEW DRUG ADDON: CPT | Mod: 59 | Performed by: EMERGENCY MEDICINE

## 2023-05-22 PROCEDURE — 96374 THER/PROPH/DIAG INJ IV PUSH: CPT | Performed by: EMERGENCY MEDICINE

## 2023-05-22 RX ORDER — ALPRAZOLAM 0.25 MG
0.5 TABLET ORAL ONCE
Status: COMPLETED | OUTPATIENT
Start: 2023-05-22 | End: 2023-05-22

## 2023-05-22 RX ORDER — ONDANSETRON 2 MG/ML
8 INJECTION INTRAMUSCULAR; INTRAVENOUS ONCE
Status: COMPLETED | OUTPATIENT
Start: 2023-05-22 | End: 2023-05-22

## 2023-05-22 RX ORDER — LORAZEPAM 2 MG/ML
1 INJECTION INTRAMUSCULAR ONCE
Status: COMPLETED | OUTPATIENT
Start: 2023-05-22 | End: 2023-05-22

## 2023-05-22 RX ORDER — MIDODRINE HYDROCHLORIDE 5 MG/1
10 TABLET ORAL 3 TIMES DAILY
Qty: 270 TABLET | Refills: 3 | OUTPATIENT
Start: 2023-05-22

## 2023-05-22 RX ORDER — HYDROMORPHONE HYDROCHLORIDE 1 MG/ML
0.5 INJECTION, SOLUTION INTRAMUSCULAR; INTRAVENOUS; SUBCUTANEOUS
Status: DISCONTINUED | OUTPATIENT
Start: 2023-05-22 | End: 2023-05-22 | Stop reason: HOSPADM

## 2023-05-22 RX ADMIN — ONDANSETRON 8 MG: 2 INJECTION INTRAMUSCULAR; INTRAVENOUS at 04:29

## 2023-05-22 RX ADMIN — HYDROMORPHONE HYDROCHLORIDE 0.5 MG: 1 INJECTION, SOLUTION INTRAMUSCULAR; INTRAVENOUS; SUBCUTANEOUS at 04:29

## 2023-05-22 RX ADMIN — LORAZEPAM 1 MG: 2 INJECTION INTRAMUSCULAR; INTRAVENOUS at 04:30

## 2023-05-22 RX ADMIN — ALPRAZOLAM 0.5 MG: 0.25 TABLET ORAL at 08:29

## 2023-05-22 ASSESSMENT — ACTIVITIES OF DAILY LIVING (ADL)
ADLS_ACUITY_SCORE: 35
ADLS_ACUITY_SCORE: 35

## 2023-05-22 NOTE — ED TRIAGE NOTES
Pt reports today with c/op missing dialsys  on Saturday. Pt reports some anxiety and shortness of breath     Triage Assessment     Row Name 05/22/23 [id number]       Triage Assessment (Adult)    Airway WDL WDL       Respiratory WDL    Respiratory WDL WDL       Skin Circulation/Temperature WDL    Skin Circulation/Temperature WDL WDL       Cardiac WDL    Cardiac WDL WDL       Peripheral/Neurovascular WDL    Peripheral Neurovascular WDL WDL

## 2023-05-22 NOTE — ED PROVIDER NOTES
"  History     Chief Complaint   Patient presents with     needs dialysis     HPI  Dax Villareal is a 32 year old male with PMH notable for ESRD on HD Tu/Th/Sa, anxiety, pancreatitis who presents to the ED with LUQ abdominal pain, shortness of breath.  Patient reports that he missed dialysis on Saturday (1.5 days ago) and would be due again on Tuesday.  He awoke around 0045 and felt very uncomfortable.  He had shortness of breath, a \"tightness\" between the shoulder blades, and some pain in the LUQ area of the abdomen.  Patient also notes increased anxiety sensation and some cramping in the hands.  He had 1 episode of vomiting.  No diarrhea, no fever.     Past Medical History  Past Medical History:   Diagnosis Date     ADHD 2008     Alcohol abuse, in remission      Alcoholic cirrhosis of liver with ascites (H)      Anemia in chronic kidney disease      Chronic pain      Current smoker      End stage renal disease (H)      Hypotension, unspecified hypotension type      Malnutrition (H)      Metabolic acidosis      Necrotizing pancreatitis      Pancreatic insufficiency      Pericarditis      Recurrent pleural effusion      Past Surgical History:   Procedure Laterality Date     COMBINED ESOPHAGOSCOPY, GASTROSCOPY, DUODENOSCOPY (EGD), TISSUE APPOSI N/A 9/20/2021    Procedure: ESOPHAGOGASTRODUODENOSCOPY WITH SUTURE FISTULA CLOSURE, argon plasma coagulation;  Surgeon: Jose Quigley MD;  [facility]     ENDOSCOPIC INSERTION TUBE GASTROSTOMY N/A 7/13/2021    Procedure: Upper endoscopy, INSERTION, PEG-J TUBE and Gastropexy;  Surgeon: Rayshawn Will MD;  [facility]     ENDOSCOPIC INSERTION TUBE GASTROSTOMY  9/13/2021    Procedure: PEG/J PLACEMENT.;  Surgeon: Rayshawn Will MD;  [facility]     ENDOSCOPIC ULTRASOUND UPPER GASTROINTESTINAL TRACT (GI) N/A 7/21/2021    Procedure: ENDOSCOPIC ULTRASOUND, ESOPHAGOSCOPY / UPPER GASTROINTESTINAL TRACT (GI) with cyst gastrostomy, dilation;  Surgeon: " Guru Yecenia Chacko MD;  [facility]     ENDOSCOPIC ULTRASOUND, ESOPHAGOSCOPY, GASTROSCOPY, DUODENOSCOPY (EGD), NECROSECTOMY N/A 8/11/2021    Procedure: ESOPHAGOGASTRODUODENOSCOPY (EGD) with necrosectomy, stent exchange.;  Surgeon: Amadou Beasley MD;  [facility]     ESOPHAGOSCOPY, GASTROSCOPY, DUODENOSCOPY (EGD), COMBINED N/A 7/28/2021    Procedure: ESOPHAGOGASTRODUODENOSCOPY (EGD), gastrostomy apposition, Necrosectomy, stent exchange.;  Surgeon: Rayshawn Will MD;  [facility]     ESOPHAGOSCOPY, GASTROSCOPY, DUODENOSCOPY (EGD), COMBINED N/A 8/6/2021    Procedure: ESOPHAGOGASTRODUODENOSCOPY (EGD) with necrosectomy, and stents exchanged;  Surgeon: Jose Quigley MD;  [facility]     ESOPHAGOSCOPY, GASTROSCOPY, DUODENOSCOPY (EGD), COMBINED Left 9/13/2021    Procedure: ESOPHAGOGASTRODUODENOSCOPY (EGD), GASTROSTOMY TUBE REMOVAL, FISTULAR CLOSURE ENDOSCOPIC.;  Surgeon: Rayshawn Will MD;  [facility]     IR ABSCESS TUBE CHANGE  7/14/2021     IR NG TUBE PLACEMENT REQ RAD & FLUORO  10/26/2021     IR PARACENTESIS  7/21/2021     IR SINOGRAM INJECTION DIAGNOSTIC  8/25/2021     IR THORACENTESIS  2/5/2022     ORTHOPEDIC SURGERY Right     fracture repair     REPLACE GASTROSTOMY TUBE, PERCUTANEOUS N/A 7/28/2021    Procedure: gastro-jejunostomy tube exchange;  Surgeon: Rayshawn Will MD;  Location[facility]     acetaminophen (TYLENOL) 325 MG/10.15ML solution  ALPRAZolam (XANAX) 0.5 MG tablet  amphetamine-dextroamphetamine (ADDERALL XR) 30 MG 24 hr capsule  [START ON 6/18/2023] amphetamine-dextroamphetamine (ADDERALL XR) 30 MG 24 hr capsule  amphetamine-dextroamphetamine (ADDERALL XR) 30 MG 24 hr capsule  amphetamine-dextroamphetamine (ADDERALL) 20 MG tablet  [START ON 6/16/2023] amphetamine-dextroamphetamine (ADDERALL) 20 MG tablet  [START ON 7/17/2023] amphetamine-dextroamphetamine (ADDERALL) 20 MG tablet  amphetamine-dextroamphetamine (ADDERALL) 20 MG  "tablet  amylase-lipase-protease (CREON 24) 33995-36265 units CPEP per EC capsule  Cetaphil Moisturizing (CETAPHIL) external lotion  diclofenac (VOLTAREN) 1 % topical gel  DULoxetine (CYMBALTA) 60 MG capsule  folic acid (FOLVITE) 1 MG tablet  Guar Gum (FIBER MODULAR, NUTRISOURCE FIBER,) packet  lactulose (CHRONULAC) 10 GM/15ML solution  lidocaine (XYLOCAINE) 5 % external ointment  midodrine (PROAMATINE) 5 MG tablet  naloxone (NARCAN) 4 MG/0.1ML nasal spray  nortriptyline (PAMELOR) 25 MG capsule  pantoprazole (PROTONIX) 40 MG EC tablet  potassium chloride (KLOR-CON) 20 MEQ packet  pregabalin (LYRICA) 300 MG capsule  prochlorperazine (COMPAZINE) 5 MG tablet  rOPINIRole (REQUIP) 0.25 MG tablet  sevelamer carbonate, RENVELA, 0.8 GM PACK Packet  silver nitrate (ARZOL) 75-25 % miscellaneous  sodium bicarbonate 325 MG tablet  tiZANidine (ZANAFLEX) 2 MG tablet  torsemide (DEMADEX) 20 MG tablet  triamcinolone (KENALOG) 0.1 % external cream  vitamin (B COMPLEX) capsule      No Known Allergies  Family History  Family History   Problem Relation Age of Onset     Attention Deficit Disorder Mother      Alcoholism Mother      Alcoholism Father      Alcoholism Brother      Alcoholism Sister      Social History   Social History     Tobacco Use     Smoking status: Every Day     Packs/day: 0.25     Types: Cigarettes     Smokeless tobacco: Never     Tobacco comments:     8/24/2021 Patient did not want to discuss quitting but would like 4 mg lozenge to use during admission. Patient also accepted \"Quitting for Good\" workbook   Vaping Use     Vaping status: Never Used     Passive vaping exposure: Yes   Substance Use Topics     Alcohol use: Not Currently     Comment: \"no alcohol for over 1 year ago\" per pt on 5/12/22     Drug use: No         A medically appropriate review of systems was performed with pertinent positives and negatives noted in the HPI, and all other systems negative.    Physical Exam   BP: 122/82  Pulse: 91  Temp: 97.5  F " "(36.4  C)  Resp: 16  Height: 170.2 cm (5' 7\")  Weight: 75 kg (165 lb 5.5 oz)  SpO2: 98 %    Physical Exam  General: Appears anxious. Appears stated age.   HENT: MMM, no oropharyngeal lesions  Eyes: PERRL, normal sclerae  Cardio: Regular rate. Regular rhythm. Extremities well perfused  Resp: Normal work of breathing, normal respiratory rate.  Chest/Back: no visual signs of trauma, no CVA tenderness, tunneled line in right anterior chest  Abdomen: LUQ only tenderness, non-distended, no rebound, no guarding  Neuro: alert and fully oriented. CN II-XII grossly intact. Grossly normal strength and sensation in all extremities.   MSK: no deformities. Grossly normal ROM in extremities.   Integumentary/Skin: no rash visualized, normal color  Psych: Anxious affect, normal behavior    ED Course      Procedures  Results for orders placed during the hospital encounter of 05/22/23    POC US ECHO LIMITED    Impression  Limited Bedside ED Cardiac Ultrasound  PROCEDURE: PERFORMED BY: Dr. Loki Stevens MD  INDICATIONS/SYMPTOM:  Shortness of Breath  PROBE: Cardiac phased array probe  BODY LOCATION: Chest (cardiac)  FINDINGS: The ultrasound was performed utilizing the subcostal, parasternal long axis, parasternal short axis and apical 4 chamber views.  Grossly normal LV contractility. No RV dilation visualized. No pericardial effusion visualized. IVC grossly midrange in diameter with < 50% respiratory variability.  INTERPRETATION:    Grossly normal LV contractility. No pericardial effusion. No RV dilation. IVC size and variability consistent with normovolemia.  IMAGE DOCUMENTATION: Images were archived to PACs system.    Limited Bedside ED Ultrasound of Thorax:  PROCEDURE: PERFORMED BY: Dr. Loki Stevens MD  INDICATIONS/SYMPTOM:  shortness of breath  PROBE: Cardiac phased array probe  BODY LOCATION: Chest (Lungs)  FINDINGS: Images of both lung hemithoracies taken in 2D in multiple rib spaces  Left lung: Sliding signs intact. " Minimal B-lines. Lung base without visualized fluid above the diaphragm.  Right lung: Sliding signs intact. Minimal B-lines. Lung base without visualized fluid above the diaphragm.  INTERPRETATION: No evidence of pulmonary edema, no evidence of pleural effusion, no evidence of pneumothorax.  IMAGE DOCUMENTATION: Images were archived to PACs system.       ED Course Selections:        EKG Interpretation:      Interpreted by Loki Stevens MD  Time reviewed: 0400  Symptoms at time of EKG: Chest pain, shortness of breath, missed dialysis  Rhythm: normal sinus   Rate: Normal  Axis: Normal  Ectopy: none  Conduction: normal  ST Segments/ T Waves: No ST-T wave changes and No acute ischemic changes, no T peaking  Q Waves: none  Comparison to prior: Unchanged from 4/1/2023    Clinical Impression: normal EKG, no hyperkalemic changes                   Labs Ordered and Resulted from Time of ED Arrival to Time of ED Departure   BASIC METABOLIC PANEL - Abnormal       Result Value    Sodium 134 (*)     Potassium 3.9      Chloride 98      Carbon Dioxide (CO2) 21 (*)     Anion Gap 15      Urea Nitrogen 24.3 (*)     Creatinine 3.11 (*)     Calcium 10.0      Glucose 98      GFR Estimate 26 (*)    MAGNESIUM - Abnormal    Magnesium 2.4 (*)    CBC WITH PLATELETS AND DIFFERENTIAL - Abnormal    WBC Count 4.8      RBC Count 4.87      Hemoglobin 14.6      Hematocrit 42.0      MCV 86      MCH 30.0      MCHC 34.8      RDW 13.1      Platelet Count 146 (*)     % Neutrophils 68      % Lymphocytes 19      % Monocytes 6      % Eosinophils 5      % Basophils 2      % Immature Granulocytes 0      NRBCs per 100 WBC 0      Absolute Neutrophils 3.2      Absolute Lymphocytes 0.9      Absolute Monocytes 0.3      Absolute Eosinophils 0.3      Absolute Basophils 0.1      Absolute Immature Granulocytes 0.0      Absolute NRBCs 0.0     TROPONIN T, HIGH SENSITIVITY - Abnormal    Troponin T, High Sensitivity 50 (*)    ISTAT GASES ELECTROLYTES ICA GLUCOSE  VENOUS POCT - Abnormal    CPB Applied No      Hematocrit POCT 40      Calcium, Ionized Whole Blood POCT 4.9      Glucose Whole Blood POCT 92      Bicarbonate Venous POCT 25      Hemoglobin POCT 13.6      Potassium POCT 3.4      Sodium POCT 136      pCO2 Venous POCT 33 (*)     pO2 Venous POCT 58 (*)     pH Venous POCT 7.48 (*)     O2 Sat, Venous POCT 92 (*)    TROPONIN T, HIGH SENSITIVITY - Abnormal    Troponin T, High Sensitivity 47 (*)    PHOSPHORUS - Normal    Phosphorus 2.6     LIPASE - Normal    Lipase 28     HEPATIC FUNCTION PANEL    Protein Total 7.7      Albumin 4.7      Bilirubin Total 0.7      Alkaline Phosphatase 117      AST 30      ALT 31      Bilirubin Direct         Chest XR,  PA & LAT   Final Result   IMPRESSION: Stable cardiomediastinal silhouette. Dual lumen right central line tip cavoatrial junction. Atelectasis or infiltrate right lung base and small right effusion. Left lung clear.      POC US ECHO LIMITED   Final Result   Limited Bedside ED Cardiac Ultrasound   PROCEDURE: PERFORMED BY: Dr. Loki Stevens MD   INDICATIONS/SYMPTOM:  Shortness of Breath   PROBE: Cardiac phased array probe   BODY LOCATION: Chest (cardiac)   FINDINGS: The ultrasound was performed utilizing the subcostal, parasternal long axis, parasternal short axis and apical 4 chamber views.   Grossly normal LV contractility. No RV dilation visualized. No pericardial effusion visualized. IVC grossly midrange in diameter with < 50% respiratory variability.    INTERPRETATION:    Grossly normal LV contractility. No pericardial effusion. No RV dilation. IVC size and variability consistent with normovolemia.    IMAGE DOCUMENTATION: Images were archived to PACs system.      Limited Bedside ED Ultrasound of Thorax:   PROCEDURE: PERFORMED BY: Dr. Loki Stevens MD   INDICATIONS/SYMPTOM:  shortness of breath   PROBE: Cardiac phased array probe   BODY LOCATION: Chest (Lungs)   FINDINGS: Images of both lung hemithoracies taken in 2D  in multiple rib spaces   Left lung: Sliding signs intact. Minimal B-lines. Lung base without visualized fluid above the diaphragm.    Right lung: Sliding signs intact. Minimal B-lines. Lung base without visualized fluid above the diaphragm.    INTERPRETATION: No evidence of pulmonary edema, no evidence of pleural effusion, no evidence of pneumothorax.    IMAGE DOCUMENTATION: Images were archived to PACs system.                  Medical Decision Making  The patient's presentation was of high complexity (a chronic illness severe exacerbation, progression, or side effect of treatment) - ESRD with missed dialysis and concern for pulmonary edema and electrolyte abnormalities.    The patient's evaluation involved:  ordering and/or review of 3+ test(s) in this encounter (see separate area of note for details)  independent interpretation of testing performed by another health professional (CXR)    The patient's management necessitated high risk (a parenteral controlled substance).      Assessments & Plan (with Medical Decision Making)   Patient presenting with shortness of breath, LUQ abdominal pain, 1 episode of vomiting in the context of missed dialysis. Vitals in the ED unremarkable. Nursing notes reviewed. Initial differential diagnosis includes but not limited to pulmonary edema, hyperkalemia, gastritis, pancreatitis, ACS.     ECG showed NSR without evidence of acute ischemia and without hyperkalemic changes. HS troponin mildly elevated at 50, stable on repeat at 47, likely chronically elevated 2/2 ESRD. Bedside cardiac and lung US demonstrates normal cardiac function, no evidence of pulmonary edema. CXR with potential effusion on the right which was not seen at the lung base on US, also not showing signs of pulmonary edema.     Cr 3.11, similar to baseline. K wnl at 3.9. VBG demonstrated respiratory alkalosis c/w hyperventilation. No indication for emergent dialysis.     In the ED, the patient's symptoms were managed  with IV lorazepam and ondansetron, with improvement in symptoms upon reassessment.     The complete clinical picture is most consistent with panic/anxiety attack and missed hemodialysis. After counseling on the diagnosis, work-up, and treatment plan, the patient was discharged to home. The patient was advised to follow-up with his dialysis center this coming day, and with his PCP in a few days. The patient was advised to return to the ED if worsening symptoms, or any urgent health concerns.     Final diagnoses:   Shortness of breath   Anxiety   ESRD on hemodialysis (H)     Discharge Medication List as of 5/22/2023  7:17 AM        --  oLki Stevens MD   Emergency Medicine   [facility] EMERGENCY DEPARTMENT  5/22/2023     Loki Stevens MD  05/24/23 [id number]

## 2023-05-22 NOTE — ED NOTES
Bed: ED18  Expected date:   Expected time:   Means of arrival:   Comments:  Leila 647  32m   Dialysis missed sat   Short of breath  Needs dialysis   VSS  yellow

## 2023-05-22 NOTE — TELEPHONE ENCOUNTER
Please assist patient with scheduling hospital follow up    Your Primary Care Provider in 2-4 days    Can take approval only      Brianna Bradley RN

## 2023-05-22 NOTE — TELEPHONE ENCOUNTER
Called patient and scheduled hospital follow up.    SEBASTIAN Hernandez  [facility]

## 2023-05-22 NOTE — TELEPHONE ENCOUNTER
Pt being discharged from hospital but calling PCP for refills of midodrine and xanax        RN relayed xanax has refill on file and pended midodrine for reill pool.    Patient verbalized understanding and in agreement with plan of care.     Valeria Antunez RN

## 2023-05-22 NOTE — DISCHARGE INSTRUCTIONS
Instructions from your doctor today:  Emergency Department (ED) testing is focused on the potential causes of your symptoms that are the most dangerous possibilities, and cannot cover every possibility. Based on the evaluation, it was deemed sufficiently safe to discharge and continue management through the clinics. Thus, follow-up is very important to assess for improvement/worsening, potential further testing, and potential treatment adjustments. If you were given opioid pain medications or other medications that can make you drowsy while in the ED, you should not drive for at least several hours and not until you feel completely back to normal.     For anxiety,  your prescribed alprazolam (Xanax) refill at the [facility].     Please make an appointment to follow up with:  - your dialysis unit for dialysis ideally today, likely would be ok for tomorro  And  - Your Primary Care Provider in 2-4 days  - If you do not have a primary care provider, you can be seen in follow-up and establish care by calling any of the clinics below:     - Primary [facility] Center (phone: [phone])     - Primary Care / [facility] (phone: [phone])   - Have your clinic provider review the results from today's visit with you again, including any potential follow-up or additional testing that may be needed based on the results. Occasionally, incidental findings are found on later review by radiologists that may need follow-up.     Return to the Emergency Department immediately if you have worsening symptoms, or any other urgent health concerns.

## 2023-05-25 ENCOUNTER — OFFICE VISIT (OUTPATIENT)
Dept: FAMILY MEDICINE | Facility: CLINIC | Age: 33
End: 2023-05-25
Payer: COMMERCIAL

## 2023-05-25 VITALS
DIASTOLIC BLOOD PRESSURE: 78 MMHG | SYSTOLIC BLOOD PRESSURE: 130 MMHG | OXYGEN SATURATION: 100 % | HEART RATE: 103 BPM | TEMPERATURE: 98.6 F

## 2023-05-25 DIAGNOSIS — N18.6 ANEMIA IN CHRONIC KIDNEY DISEASE, ON CHRONIC DIALYSIS (H): ICD-10-CM

## 2023-05-25 DIAGNOSIS — F10.11 ALCOHOL ABUSE, IN REMISSION: ICD-10-CM

## 2023-05-25 DIAGNOSIS — M24.576 CONTRACTURE OF JOINT OF FOOT, UNSPECIFIED LATERALITY: ICD-10-CM

## 2023-05-25 DIAGNOSIS — F41.1 GAD (GENERALIZED ANXIETY DISORDER): Primary | ICD-10-CM

## 2023-05-25 DIAGNOSIS — F90.9 ATTENTION DEFICIT HYPERACTIVITY DISORDER (ADHD), UNSPECIFIED ADHD TYPE: ICD-10-CM

## 2023-05-25 DIAGNOSIS — N17.0 ACUTE KIDNEY FAILURE WITH TUBULAR NECROSIS (H): ICD-10-CM

## 2023-05-25 DIAGNOSIS — Z99.2 ANEMIA IN CHRONIC KIDNEY DISEASE, ON CHRONIC DIALYSIS (H): ICD-10-CM

## 2023-05-25 DIAGNOSIS — D63.1 ANEMIA IN CHRONIC KIDNEY DISEASE, ON CHRONIC DIALYSIS (H): ICD-10-CM

## 2023-05-25 PROCEDURE — 99215 OFFICE O/P EST HI 40 MIN: CPT | Performed by: FAMILY MEDICINE

## 2023-05-25 PROCEDURE — 96127 BRIEF EMOTIONAL/BEHAV ASSMT: CPT | Performed by: FAMILY MEDICINE

## 2023-05-25 RX ORDER — DEXTROAMPHETAMINE SACCHARATE, AMPHETAMINE ASPARTATE MONOHYDRATE, DEXTROAMPHETAMINE SULFATE AND AMPHETAMINE SULFATE 7.5; 7.5; 7.5; 7.5 MG/1; MG/1; MG/1; MG/1
30 CAPSULE, EXTENDED RELEASE ORAL DAILY
Qty: 30 CAPSULE | Refills: 0 | Status: SHIPPED | OUTPATIENT
Start: 2023-07-17 | End: 2023-09-28

## 2023-05-25 RX ORDER — MIDODRINE HYDROCHLORIDE 5 MG/1
10 TABLET ORAL 3 TIMES DAILY
Qty: 270 TABLET | Refills: 3 | Status: CANCELLED | OUTPATIENT
Start: 2023-05-25

## 2023-05-25 ASSESSMENT — PATIENT HEALTH QUESTIONNAIRE - PHQ9
SUM OF ALL RESPONSES TO PHQ QUESTIONS 1-9: 7
SUM OF ALL RESPONSES TO PHQ QUESTIONS 1-9: 7
10. IF YOU CHECKED OFF ANY PROBLEMS, HOW DIFFICULT HAVE THESE PROBLEMS MADE IT FOR YOU TO DO YOUR WORK, TAKE CARE OF THINGS AT HOME, OR GET ALONG WITH OTHER PEOPLE: EXTREMELY DIFFICULT

## 2023-05-25 NOTE — PROGRESS NOTES
Answers for HPI/ROS submitted by the patient on 5/25/2023  If you checked off any problems, how difficult have these problems made it for you to do your work, take care of things at home, or get along with other people?: Extremely difficult  PHQ9 TOTAL SCORE: 7  Assessment & Plan     Patient with history of end-stage renal disease, on hemodialysis 3 times per week.  History of generalized anxiety disorder, history of chronic pain.  Patient was seen in the ER on May 22, 2023, secondary of short of breath, chest tightness.  He had missed his hemodialysis.  His ER evaluation was negative there was no acute finding.   Today patient is feeling better.  Today, he denies chest pain, denies shortness of breath, weight remains stable.  His anxiety is stable and he continues to take Xanax 1 tablet daily as needed.  In addition, continue with duloxetine, and nortriptyline at bedtime.    Patient reports that his PCA, comes less hours now.  His Count includes the [facility]  has been busy and has not providing the care that he needs.  Previously, he was referred to a pain clinic, however patient has not called and made an appointment.    I did give him the phone number for the pain clinic again and ask him to call and make an appointment.  In addition, I put a referral for our  to to work with the patient and help him with other needed that he may need.  In addition, referred to therapist, he does have an appointment to see psychiatry October 18, 2023.    In the meantime we will continue with all his current medication.    DIMITRI (generalized anxiety disorder)    - Adult Mental Health  Referral; Future    Acute kidney failure with tubular necrosis (H)    - Primary Care - Care Coordination Referral; Future    Anemia in chronic kidney disease, on chronic dialysis (H)      Alcohol abuse, in remission    Contracture of joint of foot, unspecified laterality  Continue with Lyrica and nortriptyline.    Attention deficit  hyperactivity disorder (ADHD), unspecified ADHD type    - amphetamine-dextroamphetamine (ADDERALL XR) 30 MG 24 hr capsule; Take 1 capsule (30 mg) by mouth daily    Over 42 minutes spent reviewing chart, reviewing test results, talking with and examining patient, formulating plan, and documentation on the day of the encounter.  Reviewed ER note and labs, discussed with patient.      David Verduzco is a 32 year old, presenting for the following health issues:  Hospital F/U        5/25/2023     9:32 AM   Additional Questions   Accompanied by na         5/25/2023     9:32 AM   Patient Reported Additional Medications   Patient reports taking the following new medications No, Asked them not too       ED/UC Followup:    Facility:  [facility] Emergency Department  Date of visit: 05/222/2023  Reason for visit: LUQ abdominal pain and SOB  Current Status: Little better      Review of Systems   Constitutional, HEENT, cardiovascular, pulmonary, GI, , musculoskeletal, neuro, skin, endocrine and psych systems are negative, except as otherwise noted.      Objective    There were no vitals taken for this visit.  There is no height or weight on file to calculate BMI.  Physical Exam   GENERAL: healthy, alert and no distress  Sitting in scooter.  NECK: no adenopathy, no asymmetry, masses, or scars and thyroid normal to palpation  RESP: lungs clear to auscultation - no rales, rhonchi or wheezes  CV: regular rate and rhythm, normal S1 S2, no S3 or S4, no murmur, click or rub, no peripheral edema and peripheral pulses strong  ABDOMEN: soft, nontender, no hepatosplenomegaly, no masses and bowel sounds normal  MS: no gross musculoskeletal defects noted, no edema  BACK: no CVA tenderness, no paralumbar tenderness  PSYCH: mentation appears normal, affect normal/bright    Orders Placed This Encounter   Procedures     Primary Care - Care Coordination Referral     Adult Mental Health  Referral     Lab Results    Component Value Date    WBC 4.8 05/22/2023    WBC 25.8 07/11/2021     Lab Results   Component Value Date    RBC 4.87 05/22/2023    RBC 2.34 07/11/2021     Lab Results   Component Value Date    HGB 13.6 05/22/2023    HGB 14.6 05/22/2023    HGB 7.5 07/11/2021     Lab Results   Component Value Date    HCT 40 05/22/2023    HCT 42.0 05/22/2023    HCT 22.7 07/11/2021     No components found for: MCT  Lab Results   Component Value Date    MCV 86 05/22/2023    MCV 97 07/11/2021     Lab Results   Component Value Date    MCH 30.0 05/22/2023    MCH 32.1 07/11/2021     Lab Results   Component Value Date    MCHC 34.8 05/22/2023    MCHC 33.0 07/11/2021     Lab Results   Component Value Date    RDW 13.1 05/22/2023    RDW 22.1 07/11/2021     Lab Results   Component Value Date     05/22/2023     07/11/2021         Kole Gomez MD

## 2023-05-26 ENCOUNTER — PATIENT OUTREACH (OUTPATIENT)
Dept: CARE COORDINATION | Facility: CLINIC | Age: 33
End: 2023-05-26
Payer: MEDICAID

## 2023-05-26 NOTE — PROGRESS NOTES
[facility]/Voicemail       Clinical Data: Care Coordinator Outreach  Outreach attempted x 1.  Left message on patient's voicemail with call back information and requested return call.  Plan:   Care Coordinator will try to reach patient again in 1-2 business days.    BLANE Lares [place], [place] [facility]  [phone]

## 2023-05-30 NOTE — PROGRESS NOTES
Clinic Care Coordination Contact  Community Health Worker Initial Outreach    CHW Initial Information Gathering:  Referral Source: PCP  Preferred Hospital: [facility]  [phone]  Current living arrangement:: I live in a private home  Community Resources: Financial/Insurance ([employer])  No PCP office visit in Past Year: No (4/17/23 with Dr. Gomez)  CHW Additional Questions  MyChart active?: Yes  Patient sent Social Determinants of Health questionnaire?: Yes    Patient accepts CC: Yes. Patient scheduled for assessment with MAI Mullen on 6/5/23 at 2:00. Patient noted desire to discuss:    -Support with County hasn't paid PCA for last 2 months.     - Food resources-Is in process of applying for SNAP.    - Financial resources    - Support with Housing section 8 renewal.    ** Patient is in process of applying for SNAP, Internet discounts and energy assistance.     ** Patient unable to complete CHW initial questions.    Reason for Referral: Financial Support    Complex Medical Concerns/Education   Complex Medical Concerns: Chronic Diagnosis - Use Comments   Financial Support: Quinton Cabrera  Community Health Worker  [facility]  Clinic Care Coordination   [facility], [facility]  Office: [phone]

## 2023-06-05 ENCOUNTER — TELEPHONE (OUTPATIENT)
Dept: FAMILY MEDICINE | Facility: CLINIC | Age: 33
End: 2023-06-05

## 2023-06-05 ENCOUNTER — PATIENT OUTREACH (OUTPATIENT)
Dept: NURSING | Facility: CLINIC | Age: 33
End: 2023-06-05
Payer: MEDICAID

## 2023-06-05 DIAGNOSIS — N17.0 ACUTE KIDNEY FAILURE WITH TUBULAR NECROSIS (H): ICD-10-CM

## 2023-06-05 RX ORDER — MIDODRINE HYDROCHLORIDE 5 MG/1
10 TABLET ORAL 3 TIMES DAILY
Qty: 270 TABLET | Refills: 3 | Status: SHIPPED | OUTPATIENT
Start: 2023-06-05 | End: 2024-07-09

## 2023-06-05 NOTE — TELEPHONE ENCOUNTER
Patient called stated that he's under a lot of stress, his anxiety is really bad. He is wondering if PCP would be willing to increase Xanax to 1-2 pills a day vs 1 pill a day.      TC huddled with PCP, PCP not willing to increase that he needs to follow with PSYCHIATRY to increase dosage.TC will really message to patient    Patient is also looking to get certified on medical marijuana advised to follow up pain mgnt.    TC will relay message.        Azalea Bell    [facility]

## 2023-06-05 NOTE — TELEPHONE ENCOUNTER
Routing to PCP    Patient is requesting midodrine refill. RN pended back on 5/22 and it was refused. PCP OV note on 5/25 states that pt is still on midodrine     Patient calling again for refill. Is this med on patient's list? Or dc'd?    Berenice Monteiro RN

## 2023-06-12 ENCOUNTER — VIRTUAL VISIT (OUTPATIENT)
Dept: PSYCHOLOGY | Facility: CLINIC | Age: 33
End: 2023-06-12
Payer: MEDICAID

## 2023-06-12 DIAGNOSIS — Z91.199 NO-SHOW FOR APPOINTMENT: Primary | ICD-10-CM

## 2023-06-12 DIAGNOSIS — F41.1 GAD (GENERALIZED ANXIETY DISORDER): ICD-10-CM

## 2023-06-12 ASSESSMENT — PATIENT HEALTH QUESTIONNAIRE - PHQ9
SUM OF ALL RESPONSES TO PHQ QUESTIONS 1-9: 8
10. IF YOU CHECKED OFF ANY PROBLEMS, HOW DIFFICULT HAVE THESE PROBLEMS MADE IT FOR YOU TO DO YOUR WORK, TAKE CARE OF THINGS AT HOME, OR GET ALONG WITH OTHER PEOPLE: EXTREMELY DIFFICULT
SUM OF ALL RESPONSES TO PHQ QUESTIONS 1-9: 8

## 2023-06-17 ENCOUNTER — APPOINTMENT (OUTPATIENT)
Dept: GENERAL RADIOLOGY | Facility: CLINIC | Age: 33
End: 2023-06-17
Attending: EMERGENCY MEDICINE
Payer: MEDICAID

## 2023-06-17 ENCOUNTER — HOSPITAL ENCOUNTER (EMERGENCY)
Facility: CLINIC | Age: 33
Discharge: HOME | End: 2023-06-17
Attending: EMERGENCY MEDICINE | Admitting: EMERGENCY MEDICINE
Payer: MEDICAID

## 2023-06-17 VITALS
OXYGEN SATURATION: 98 % | TEMPERATURE: 97.9 F | DIASTOLIC BLOOD PRESSURE: 68 MMHG | HEART RATE: 101 BPM | SYSTOLIC BLOOD PRESSURE: 101 MMHG | RESPIRATION RATE: 17 BRPM

## 2023-06-17 DIAGNOSIS — M79.671 BILATERAL FOOT PAIN: ICD-10-CM

## 2023-06-17 DIAGNOSIS — M79.672 BILATERAL FOOT PAIN: ICD-10-CM

## 2023-06-17 PROCEDURE — 73630 X-RAY EXAM OF FOOT: CPT | Mod: LT

## 2023-06-17 PROCEDURE — 73630 X-RAY EXAM OF FOOT: CPT | Mod: 26 | Performed by: RADIOLOGY

## 2023-06-17 PROCEDURE — 99283 EMERGENCY DEPT VISIT LOW MDM: CPT | Performed by: EMERGENCY MEDICINE

## 2023-06-17 PROCEDURE — 250N000013 HC RX MED GY IP 250 OP 250 PS 637: Performed by: EMERGENCY MEDICINE

## 2023-06-17 PROCEDURE — 73630 X-RAY EXAM OF FOOT: CPT | Mod: 50

## 2023-06-17 RX ORDER — PREGABALIN 100 MG/1
300 CAPSULE ORAL ONCE
Status: COMPLETED | OUTPATIENT
Start: 2023-06-17 | End: 2023-06-17

## 2023-06-17 RX ORDER — ALPRAZOLAM 0.25 MG
0.5 TABLET ORAL ONCE
Status: COMPLETED | OUTPATIENT
Start: 2023-06-17 | End: 2023-06-17

## 2023-06-17 RX ORDER — TIZANIDINE 2 MG/1
2 TABLET ORAL EVERY 6 HOURS PRN
Status: DISCONTINUED | OUTPATIENT
Start: 2023-06-17 | End: 2023-06-18 | Stop reason: HOSPADM

## 2023-06-17 RX ORDER — DEXTROAMPHETAMINE SACCHARATE, AMPHETAMINE ASPARTATE, DEXTROAMPHETAMINE SULFATE AND AMPHETAMINE SULFATE 2.5; 2.5; 2.5; 2.5 MG/1; MG/1; MG/1; MG/1
20 TABLET ORAL ONCE
Status: COMPLETED | OUTPATIENT
Start: 2023-06-17 | End: 2023-06-17

## 2023-06-17 RX ADMIN — ALPRAZOLAM 0.5 MG: 0.25 TABLET ORAL at 19:58

## 2023-06-17 RX ADMIN — PREGABALIN 300 MG: 100 CAPSULE ORAL at 19:58

## 2023-06-17 RX ADMIN — TIZANIDINE 2 MG: 2 TABLET ORAL at 20:20

## 2023-06-17 RX ADMIN — DEXTROAMPHETAMINE SACCHARATE, AMPHETAMINE ASPARTATE, DEXTROAMPHETAMINE SULFATE AND AMPHETAMINE SULFATE 20 MG: 2.5; 2.5; 2.5; 2.5 TABLET ORAL at 19:58

## 2023-06-17 ASSESSMENT — ACTIVITIES OF DAILY LIVING (ADL)
ADLS_ACUITY_SCORE: 33
ADLS_ACUITY_SCORE: 35

## 2023-06-17 NOTE — ED TRIAGE NOTES
biba for bilateral foot pain  Feet got caught between door and wheelchair @ 11am when being transferred out of vehicle into dialysis center  Called 911 at 1800 after ride home was cancelled  pmhx cirrhosis, bilat pes cavus, esrd on HD       Triage Assessment     Row Name 06/17/23 4480       Triage Assessment (Adult)    Airway WDL WDL       Respiratory WDL    Respiratory WDL WDL       Skin Circulation/Temperature WDL    Skin Circulation/Temperature WDL WDL       Cardiac WDL    Cardiac WDL WDL       Peripheral/Neurovascular WDL    Peripheral Neurovascular WDL WDL       Cognitive/Neuro/Behavioral WDL    Cognitive/Neuro/Behavioral WDL WDL

## 2023-06-18 NOTE — ED PROVIDER NOTES
[place] EMERGENCY DEPARTMENT ([facility])    6/17/23       ED PROVIDER NOTE        History     Chief Complaint   Patient presents with     Foot Pain     HPI  Dax Villareal is a 32 year old male with a past medical history of alcoholic cirrhosis of liver with ascites, ESRD on HD Tu/Th/Sa, and pancreatitis who presents to the emergency department for injury to bilateral feet.  Patient had his foot caught between wheelchair and door of the vehicle when he was being transported.  He states that his right foot pushed against his left.  Patient has bilateral pes cavus at baseline.  He states he has decreased sensation in his toes at baseline bilaterally.  No abrasions or lacerations.  No other injuries.  No other symptoms noted.    Past Medical History  Past Medical History:   Diagnosis Date     ADHD 2008     Alcohol abuse, in remission      Alcoholic cirrhosis of liver with ascites (H)      Anemia in chronic kidney disease      Chronic pain      Current smoker      End stage renal disease (H)      Hypotension, unspecified hypotension type      Malnutrition (H)      Metabolic acidosis      Necrotizing pancreatitis      Pancreatic insufficiency      Pericarditis      Recurrent pleural effusion      Past Surgical History:   Procedure Laterality Date     COMBINED ESOPHAGOSCOPY, GASTROSCOPY, DUODENOSCOPY (EGD), TISSUE APPOSI N/A 9/20/2021    Procedure: ESOPHAGOGASTRODUODENOSCOPY WITH SUTURE FISTULA CLOSURE, argon plasma coagulation;  Surgeon: Jose Quigley MD;  Location: [facility] OR     ENDOSCOPIC INSERTION TUBE GASTROSTOMY N/A 7/13/2021    Procedure: Upper endoscopy, INSERTION, PEG-J TUBE and Gastropexy;  Surgeon: Rayshawn Will MD;  Location: [facility] OR     ENDOSCOPIC INSERTION TUBE GASTROSTOMY  9/13/2021    Procedure: PEG/J PLACEMENT.;  Surgeon: Rayshawn Will MD;  Location: [facility]     ENDOSCOPIC ULTRASOUND UPPER GASTROINTESTINAL TRACT (GI) N/A 7/21/2021    Procedure: ENDOSCOPIC ULTRASOUND, ESOPHAGOSCOPY /  UPPER GASTROINTESTINAL TRACT (GI) with cyst gastrostomy, dilation;  Surgeon: Guru Yecenia Chacko MD;  Location: [facility]     ENDOSCOPIC ULTRASOUND, ESOPHAGOSCOPY, GASTROSCOPY, DUODENOSCOPY (EGD), NECROSECTOMY N/A 8/11/2021    Procedure: ESOPHAGOGASTRODUODENOSCOPY (EGD) with necrosectomy, stent exchange.;  Surgeon: Amadou Beasley MD;  Location: [facility]     ESOPHAGOSCOPY, GASTROSCOPY, DUODENOSCOPY (EGD), COMBINED N/A 7/28/2021    Procedure: ESOPHAGOGASTRODUODENOSCOPY (EGD), gastrostomy apposition, Necrosectomy, stent exchange.;  Surgeon: Rayshawn Will MD;  Location: [facility]     ESOPHAGOSCOPY, GASTROSCOPY, DUODENOSCOPY (EGD), COMBINED N/A 8/6/2021    Procedure: ESOPHAGOGASTRODUODENOSCOPY (EGD) with necrosectomy, and stents exchanged;  Surgeon: oJse Quigley MD;  Location: [facility]     ESOPHAGOSCOPY, GASTROSCOPY, DUODENOSCOPY (EGD), COMBINED Left 9/13/2021    Procedure: ESOPHAGOGASTRODUODENOSCOPY (EGD), GASTROSTOMY TUBE REMOVAL, FISTULAR CLOSURE ENDOSCOPIC.;  Surgeon: Rayshawn Will MD;  Location: [facility]     IR ABSCESS TUBE CHANGE  7/14/2021     IR NG TUBE PLACEMENT REQ RAD & FLUORO  10/26/2021     IR PARACENTESIS  7/21/2021     IR SINOGRAM INJECTION DIAGNOSTIC  8/25/2021     IR THORACENTESIS  2/5/2022     ORTHOPEDIC SURGERY Right     fracture repair     REPLACE GASTROSTOMY TUBE, PERCUTANEOUS N/A 7/28/2021    Procedure: gastro-jejunostomy tube exchange;  Surgeon: Rayshawn Will MD;  Location: [facility] OR     acetaminophen (TYLENOL) 325 MG/10.15ML solution  ALPRAZolam (XANAX) 0.5 MG tablet  [START ON 7/17/2023] amphetamine-dextroamphetamine (ADDERALL XR) 30 MG 24 hr capsule  amphetamine-dextroamphetamine (ADDERALL XR) 30 MG 24 hr capsule  amphetamine-dextroamphetamine (ADDERALL) 20 MG tablet  amphetamine-dextroamphetamine (ADDERALL) 20 MG tablet  [START ON 7/17/2023] amphetamine-dextroamphetamine (ADDERALL) 20 MG tablet  amphetamine-dextroamphetamine (ADDERALL) 20 MG  "tablet  amylase-lipase-protease (CREON 24) 77598-19032 units CPEP per EC capsule  Cetaphil Moisturizing (CETAPHIL) external lotion  diclofenac (VOLTAREN) 1 % topical gel  DULoxetine (CYMBALTA) 60 MG capsule  folic acid (FOLVITE) 1 MG tablet  Guar Gum (FIBER MODULAR, NUTRISOURCE FIBER,) packet  lactulose (CHRONULAC) 10 GM/15ML solution  lidocaine (XYLOCAINE) 5 % external ointment  midodrine (PROAMATINE) 5 MG tablet  naloxone (NARCAN) 4 MG/0.1ML nasal spray  nortriptyline (PAMELOR) 25 MG capsule  pantoprazole (PROTONIX) 40 MG EC tablet  potassium chloride (KLOR-CON) 20 MEQ packet  pregabalin (LYRICA) 300 MG capsule  prochlorperazine (COMPAZINE) 5 MG tablet  rOPINIRole (REQUIP) 0.25 MG tablet  sevelamer carbonate, RENVELA, 0.8 GM PACK Packet  silver nitrate (ARZOL) 75-25 % miscellaneous  sodium bicarbonate 325 MG tablet  tiZANidine (ZANAFLEX) 2 MG tablet  torsemide (DEMADEX) 20 MG tablet  triamcinolone (KENALOG) 0.1 % external cream  vitamin (B COMPLEX) capsule      No Known Allergies  Family History  Family History   Problem Relation Age of Onset     Attention Deficit Disorder Mother      Alcoholism Mother      Alcoholism Father      Alcoholism Brother      Alcoholism Sister      Social History   Social History     Tobacco Use     Smoking status: Every Day     Packs/day: 0.25     Types: Cigarettes     Smokeless tobacco: Never     Tobacco comments:     8/24/2021 Patient did not want to discuss quitting but would like 4 mg lozenge to use during admission. Patient also accepted \"Quitting for Good\" workbook   Vaping Use     Vaping status: Never Used     Passive vaping exposure: Yes   Substance Use Topics     Alcohol use: Not Currently     Comment: \"no alcohol for over 1 year ago\" per pt on 5/12/22     Drug use: No         A medically appropriate review of systems was performed with pertinent positives and negatives noted in the HPI, and all other systems negative.    Physical Exam   BP: 101/68  Pulse: 101  Temp: 97.9  F " (36.6  C)  Resp: 17  SpO2: 98 %  Physical Exam  Vitals and nursing note reviewed.   Constitutional:       General: He is not in acute distress.     Appearance: He is well-developed. He is not diaphoretic.      Comments: Uses wheelchair at baseline.   HENT:      Head: Normocephalic and atraumatic.      Mouth/Throat:      Pharynx: No oropharyngeal exudate.   Eyes:      General: No scleral icterus.        Right eye: No discharge.         Left eye: No discharge.      Pupils: Pupils are equal, round, and reactive to light.   Cardiovascular:      Rate and Rhythm: Normal rate and regular rhythm.      Heart sounds: Normal heart sounds. No murmur heard.     No friction rub. No gallop.      Comments: Dialysis line in place.  Pulmonary:      Effort: Pulmonary effort is normal. No respiratory distress.      Breath sounds: Normal breath sounds. No wheezing.   Chest:      Chest wall: No tenderness.   Abdominal:      General: Bowel sounds are normal. There is no distension.      Palpations: Abdomen is soft.      Tenderness: There is no abdominal tenderness.   Musculoskeletal:         General: No tenderness or deformity. Normal range of motion.      Cervical back: Normal range of motion and neck supple.      Comments: Bilateral contractures and feet, no laceration or abrasion.   Skin:     General: Skin is warm and dry.      Coloration: Skin is not pale.      Findings: No erythema or rash.   Neurological:      Mental Status: He is alert and oriented to person, place, and time.      Cranial Nerves: No cranial nerve deficit.           ED Course, Procedures, & Data      Procedures                      No results found for any visits on 06/17/23.  Medications - No data to display  Labs Ordered and Resulted from Time of ED Arrival to Time of ED Departure - No data to display  No orders to display              Assessment & Plan    This is a 32-year-old male who presents with bilateral foot injury.  Patient uses wheelchair and when  transferring on lift into a vehicle had both of his feet caught against the door.  Exam demonstrates no acute abnormalities.  X-rays show no acute abnormalities.  I discussed all results with patient.  Will discharge with return precautions.    I have reviewed the nursing notes. I have reviewed the findings, diagnosis, plan and need for follow up with the patient.    New Prescriptions    No medications on file       Final diagnoses:   None       Harpreet Wilson DO  [facility] EMERGENCY DEPARTMENT  6/17/2023     Harpreet Wilson DO  06/18/23 0234

## 2023-06-20 ENCOUNTER — TELEPHONE (OUTPATIENT)
Dept: FAMILY MEDICINE | Facility: CLINIC | Age: 33
End: 2023-06-20
Payer: MEDICAID

## 2023-06-20 NOTE — TELEPHONE ENCOUNTER
He can take Tylenol, and continue with Duloxetine, nortriptyline, Lyrica and zanflex    Follow up with Pain management, ( he was referred multiple times )

## 2023-06-20 NOTE — TELEPHONE ENCOUNTER
Attempt #1 to call patient.     RN left voicemail and requested return call to [facility] at [phone].     Valeria Antunez RN  [facility]: [place]

## 2023-06-20 NOTE — TELEPHONE ENCOUNTER
Routing to team    Please e-mail patient  OTIS form.    RN received call from patient.    RN reviewed providers message.    RN also advised patient should follow up with Pain.    Patient statd he will after he gets things sorted out.    Patient requested an OTIS be emailed.    Phil Daniel RN, BSN, PHN  [facility]

## 2023-06-20 NOTE — TELEPHONE ENCOUNTER
Patient calling to let us know that he had to push dialysis out because his wheelchair is at the dialysis place and he cannot go anywhere until he gets his wheelchair back.  Patient was approved for 70 hours a week for a PCA so is hoping to get that going soon. Patient is having pain but knows that he has to go to pain management.  All of a sudden patient had to go and could not talk anymore.  I am not sure what patient is wanting from us besides just letting us know an update.    SEBASTIAN Hernandez  [facility]

## 2023-06-20 NOTE — TELEPHONE ENCOUNTER
Patient called back states that hes in a lot of pain.    Would like to know what kind of over the counter medication he can take for his foot pain.      Azalea Bell    [facility]

## 2023-07-13 ENCOUNTER — PATIENT OUTREACH (OUTPATIENT)
Dept: CARE COORDINATION | Facility: CLINIC | Age: 33
End: 2023-07-13
Payer: MEDICAID

## 2023-07-13 NOTE — PROGRESS NOTES
Reviewed chart and discussed with CC MAI.   Complex patient, CC SW requested CHW be removed from the care team at this time.     BLANE Lares [place], [facility] [place] and [facility]  [phone]

## 2023-07-19 NOTE — PROGRESS NOTES
Clinic Care Coordination Contact    MAI initial phone assessment.  Patient had many concerns, he stated he does not wish to discuss them with SW as he has reached out to the appropriate parties to address.  Patient's main focus was need for a reassessment for his PCA, he has been in contact with the county having lost [place] when stayed in a TCU for an extended time.  SW provided her contact information to patient for future need     DARNELL Heart, MSW   [facility]  Care Coordination  [facility]  [phone]  6/5/2023 3:24 PM

## 2023-07-19 NOTE — TELEPHONE ENCOUNTER
Chief Complaint   Patient presents with    Heartburn    Difficulty Swallowing       Subjective    Sharan Steiner is a 82 y.o. male. he is here today for follow-up.                                                                  Assessment & Plan                                     1. Gastroesophageal reflux disease with esophagitis without hemorrhage    2. Esophageal motility disorder    3. Esophageal dysphagia      Plan; schedule patient for EGD with dilation and Botox.  Continue PPI daily avoid gastric irritants follow standard antireflux measures.    Follow-up: Return in about 6 months (around 1/19/2024) for Recheck.     HPI  82-year-old male presents with his daughter regarding esophageal dysphagia esophageal motility disorder and chronic GERD.  Most recent EGD completed 12/6/2020 2 injection of Botox has greatly improved his ability to eat maintain his weight but over the last month he has had increased dysphagia with intake mucus production and increased coughing up phlegm material throughout the day.  His weight is down 9 pounds since December.  He has been taking PPI daily.  Denies any change in bowel movement or blood within the stool.    Review of Systems  Review of Systems   Constitutional:  Negative for activity change, appetite change, chills, diaphoresis, fatigue, fever and unexpected weight change.   HENT:  Positive for hearing loss ([place]) and trouble swallowing. Negative for sore throat.    Respiratory:  Negative for shortness of breath.    Gastrointestinal:  Positive for abdominal pain (more severe reflux at night over last few weeks). Negative for abdominal distention, anal bleeding, blood in stool, constipation, diarrhea, nausea, rectal pain and vomiting.   Musculoskeletal:  Negative for arthralgias.   Skin:  Negative for pallor.   Neurological:  Negative for light-headedness.  RN received leonides from [place] at [facility] regarding fax for patients wheelchair.    RN advised formes were received.    RN advised she would locate forms and assure they were faxed.    SEBASTIAN Schuster, had forms and will fax to [facility].    Phil Daniel RN, BSN, PHN  [facility]   "    /82 (BP Location: Left arm)   Pulse 72   Ht 175.3 cm (69\")   Wt 83.9 kg (185 lb)   BMI 27.32 kg/mý     Objective      Physical Exam  Constitutional:       General: He is not in acute distress.     Appearance: Normal appearance. He is normal weight. He is not ill-appearing or toxic-appearing.   HENT:      Head: Normocephalic and atraumatic.   Pulmonary:      Effort: Pulmonary effort is normal.   Abdominal:      General: Abdomen is flat. Bowel sounds are normal. There is no distension.      Palpations: Abdomen is soft. There is no mass.      Tenderness: There is no abdominal tenderness.   Neurological:      Mental Status: He is alert.             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    Cortical senile cataract     Eyelid cancer     Left  eyelid Sebacous Cancer     History of transfusion     Hypertension     Lymphoma of testis 11/2016    Seizures      Past Surgical History:   Procedure Laterality Date    BACK SURGERY      COLONOSCOPY  01/13/2017    ENDOSCOPY N/A 07/17/2019    Procedure: ESOPHAGOGASTRODUODENOSCOPY possible dilation;  Surgeon: Jeff Crabtree MD;  Location: [facility] ENDOSCOPY;  Service: Gastroenterology    ENDOSCOPY N/A 12/09/2019    Procedure: ESOPHAGOGASTRODUODENOSCOPY possible dilation;  Surgeon: Jeff Crabtree MD;  Location: [facility] ENDOSCOPY;  Service: Gastroenterology    ENDOSCOPY N/A 02/10/2021    Procedure: ESOPHAGOGASTRODUODENOSCOPY possible dilation Monday appt;  Surgeon: Jeff Crabtree MD;  Location: [facility] ENDOSCOPY;  Service: Gastroenterology;  Laterality: N/A;  savory dilation 48-54 fr    ENDOSCOPY N/A 05/26/2021    Procedure: ESOPHAGOGASTRODUODENOSCOPY possible dilation;  Surgeon: Jeff Crabtree MD;  Location: [facility] ENDOSCOPY;  Service: Gastroenterology;  Laterality: N/A;    ENDOSCOPY N/A 09/29/2021    Procedure: " ESOPHAGOGASTRODUODENOSCOPY possible dilation;  Surgeon: Jeff Crabtree MD;  Location: [facility] ENDOSCOPY;  Service: Gastroenterology;  Laterality: N/A;    ENDOSCOPY N/A 2022    Procedure: ESOPHAGOGASTRODUODENOSCOPY possible dilation;  Surgeon: Jeff Crabtree MD;  Location: [facility] ENDOSCOPY;  Service: Gastroenterology;  Laterality: N/A;    ENDOSCOPY N/A 5/3/2022    Procedure: ESOPHAGOGASTRODUODENOSCOPY;  Surgeon: Jeff Crabtree MD;  Location: [facility] ENDOSCOPY;  Service: Gastroenterology;  Laterality: N/A;    ENDOSCOPY N/A 2022    Procedure: ESOPHAGOGASTRODUODENOSCOPY with botox;  Surgeon: Jeff Crabtree MD;  Location: [facility] ENDOSCOPY;  Service: Gastroenterology;  Laterality: N/A;    ORCHIECTOMY      UPPER GASTROINTESTINAL ENDOSCOPY  02/10/2021    UPPER GASTROINTESTINAL ENDOSCOPY  2021     Family History   Problem Relation Age of Onset    Diabetes Mother     Hypertension Sister     Cancer Sister     Hypertension Brother     Cancer Brother        Allergies   Allergen Reactions    Levofloxacin Other (See Comments)     Seizures    Penicillins Anaphylaxis    Tramadol Other (See Comments)     Seizures    Crestor [Rosuvastatin Calcium] Other (See Comments)     Rhabdo    Sulfa Antibiotics Rash     Social History     Socioeconomic History    Marital status:    Tobacco Use    Smoking status: Former     Types: Cigarettes     Quit date:      Years since quittin.5    Smokeless tobacco: Never    Tobacco comments:     passive   Vaping Use    Vaping Use: Never used   Substance and Sexual Activity    Alcohol use: Not Currently     Comment: none in 40 years    Drug use: Never    Sexual activity: Defer     Current Medications:  Prior to Admission medications    Medication Sig Start Date End Date Taking? Authorizing Provider   aspirin 81 MG chewable tablet Chew 81 mg Daily.    Monique Melendez MD   atenolol (TENORMIN) 50 MG tablet Take 50 mg by mouth Daily. 16   Monique Melendez MD    Breo Ellipta 100-25 MCG/INH inhaler 1 puff Daily. 9/15/21   Monique Melendez MD   cetirizine (zyrTEC) 10 MG tablet Take 10 mg by mouth Daily.    Monique Melendez MD   doxazosin (CARDURA) 4 MG tablet Take 4 mg by mouth Daily.    Monique Melendez MD   esomeprazole (nexIUM) 40 MG capsule TAKE ONE (1) CAPSULE BY MOUTH DAILY 7/12/23   Nury Ortega APRN   fexofenadine (ALLEGRA) 180 MG tablet Take 180 mg by mouth Daily.    Monique Melendez MD   fluticasone (FLONASE) 50 MCG/ACT nasal spray 2 sprays into the nostril(s) as directed by provider 2 (Two) Times a Day. Administer 2 sprays in each nostril for each dose. 3/2/16   Monique Melendez MD   losartan (COZAAR) 100 MG tablet Take 100 mg by mouth Daily. 1/5/21   Monique Melendez MD   valproic acid (DEPAKENE) 250 MG capsule Take 250 mg by mouth. 9/9/21 3/9/22  Monique Melendez MD   VENTOLIN  (90 Base) MCG/ACT inhaler Inhale 2 puffs Every 6 (Six) Hours As Needed. 6/26/19   Monique Melendez MD     Orders placed during this encounter include:  Orders Placed This Encounter   Procedures    Obtain Informed Consent     Standing Status:   Future     Order Specific Question:   Informed Consent Given For     Answer:   ESOPHAGOGASTRODUODENOSCOPY with botox     ESOPHAGOGASTRODUODENOSCOPY with botox (N/A)  No orders of the defined types were placed in this encounter.        Review and/or summary of lab tests, radiology, procedures, medications. Review and summary of old records and obtaining of history. The risks and benefits of my recommendations, as well as other treatment options were discussed . Any questions/concerned were answered. Patient voiced understanding and agreement.          This document has been electronically signed by OSEI Harper on July 19, 2023 10:44 CDT                                               Results for orders placed or performed in visit on 05/02/22   COVID-19, [facility] IN-HOUSE, NP SWAB IN TRANSPORT MEDIA  8-10 HR TAT - Swab, Nasopharynx    Specimen: Nasopharynx; Swab   Result Value Ref Range    COVID19 Not Detected Not Detected - Ref. Range   Results for orders placed or performed in visit on 04/22/22   COVID-19,  [facility] IN-HOUSE, NP SWAB IN TRANSPORT MEDIA 8-10 HR TAT - Swab, Nasopharynx    Specimen: Nasopharynx; Swab   Result Value Ref Range    COVID19 Not Detected Not Detected - Ref. Range   Results for orders placed or performed in visit on 09/27/21   COVID-19,  [facility] IN-HOUSE, NP SWAB IN TRANSPORT MEDIA 8-10 HR TAT - Swab, Nasopharynx    Specimen: Nasopharynx; Swab   Result Value Ref Range    COVID19 Not Detected Not Detected - Ref. Range   Results for orders placed or performed in visit on 05/25/21   COVID-19,  [facility] IN-HOUSE, NP SWAB IN TRANSPORT MEDIA 8-10 HR TAT - Swab, Nasopharynx    Specimen: Nasopharynx; Swab   Result Value Ref Range    COVID19 Not Detected Not Detected - Ref. Range   Results for orders placed or performed in visit on 02/07/21   COVID-19,[facility]A [facility]RAHAM [facility] IN-HOUSE, NP/OP SWAB IN UTM/VTM/SALINE TRANSPORT MEDIA,24 HR TAT - Swab, Nasopharynx    Specimen: Nasopharynx; Swab   Result Value Ref Range    COVID19 Not Detected Not Detected - Ref. Range   Results for orders placed or performed during the hospital encounter of 07/17/19   Tissue Pathology Exam    Specimen: A: Gastric, Antrum; Tissue    B: Esophagus, Distal; Tissue   Result Value Ref Range    Case Report       Surgical Pathology Report                         Case: [id number]                                  Authorizing Provider:  Jeff Crabtree MD        Collected:           07/17/2019 05:30 PM          Ordering Location:     [facility]             Received:            07/18/2019 06:14 AM                                 [facility]                                                     Pathologist:           Domenic Espino MD                                                        Specimens:   1) - Gastric,  Antrum, antrum  cold bx                                                               2) - Esophagus, Distal, distal esophagus   cold bx                                         Final Diagnosis       1.  GASTRIC ANTRUM, MUCOSAL BIOPSY:  MILD CHRONIC GASTRITIS.  NO EVIDENCE OF HELICOBACTER PYLORI.    2.  DISTAL ESOPHAGUS, MUCOSAL BIOPSY:  SEGMENTS OF MILDLY INFLAMED STRATIFIED SQUAMOUS EPITHELIUM.      Gross Description       In 2 containers, each of these show mucosal biopsies measuring up to 0.3 cm in greatest dimension.  Embedded accordingly.  1A antrum; 2A distal esophagus.     Results for orders placed or performed in visit on 03/13/18   Tissue Pathology Exam    Specimen: Clavicle, Right; Hardware / Foreign Body   Result Value Ref Range    Case Report       Surgical Pathology Report                         Case: [id number]                                  Authorizing Provider:  Elroy Lucas MD Collected:           03/13/2018 03:35 PM          Ordering [facility]     [facility]     Received:            03/14/2018 07:48 AM                                 [facility] GENERAL SURGERY                                                        Pathologist:           Domenic Espino MD                                                         Specimen:    Clavicle, Right, Mediport                                                                  Clinical Information       Removal of Mediport right clavicular area.      Final Diagnosis       OLD MEDIPORT.      Gross Description       The specimen consists of a Mediport with a 25.0 cm rubber lead.      Embedded Images     Results for orders placed or performed in visit on 03/09/18   CBC Auto Differential    Specimen: Blood   Result Value Ref Range    WBC 6.86 3.20 - 9.80 10*3/mm3    RBC 4.28 (L) 4.37 - 5.74 10*6/mm3    Hemoglobin 12.2 (L) 13.7 - 17.3 g/dL    Hematocrit 35.7 (L) 39.0 - 49.0 %    MCV 83.4 80.0 - 98.0 fL    MCH 28.5 26.5 - 34.0 pg    MCHC 34.2  31.5 - 36.3 g/dL    RDW 12.4 11.5 - 14.5 %    RDW-SD 37.6 35.1 - 43.9 fl    MPV 9.8 8.0 - 12.0 fL    Platelets 185 150 - 450 10*3/mm3    Neutrophil % 69.1 37.0 - 80.0 %    Lymphocyte % 10.6 10.0 - 50.0 %    Monocyte % 12.7 (H) 0.0 - 12.0 %    Eosinophil % 6.6 0.0 - 7.0 %    Basophil % 0.6 0.0 - 2.0 %    Immature Grans % 0.4 0.0 - 0.5 %    Neutrophils, Absolute 4.74 2.00 - 8.60 10*3/mm3    Lymphocytes, Absolute 0.73 0.60 - 4.20 10*3/mm3    Monocytes, Absolute 0.87 0.00 - 0.90 10*3/mm3    Eosinophils, Absolute 0.45 0.00 - 0.70 10*3/mm3    Basophils, Absolute 0.04 0.00 - 0.20 10*3/mm3    Immature Grans, Absolute 0.03 (H) 0.00 - 0.02 10*3/mm3   Lactate Dehydrogenase    Specimen: Blood   Result Value Ref Range     313 - 618 U/L   Comprehensive Metabolic Panel    Specimen: Blood   Result Value Ref Range    Glucose 94 60 - 100 mg/dL    BUN 11 7 - 21 mg/dL    Creatinine 0.74 0.70 - 1.30 mg/dL    Sodium 137 137 - 145 mmol/L    Potassium 4.5 3.5 - 5.1 mmol/L    Chloride 97 95 - 110 mmol/L    CO2 27.0 22.0 - 31.0 mmol/L    Calcium 9.0 8.4 - 10.2 mg/dL    Total Protein 6.6 6.3 - 8.6 g/dL    Albumin 3.90 3.40 - 4.80 g/dL    ALT (SGPT) 29 21 - 72 U/L    AST (SGOT) 26 17 - 59 U/L    Alkaline Phosphatase 110 38 - 126 U/L    Total Bilirubin 0.4 0.2 - 1.3 mg/dL    eGFR Non  Amer 102 (H) 42 - 98 mL/min/1.73    Globulin 2.7 2.3 - 3.5 gm/dL    A/G Ratio 1.4 1.1 - 1.8 g/dL    BUN/Creatinine Ratio 14.9 7.0 - 25.0    Anion Gap 13.0 5.0 - 15.0 mmol/L   Results for orders placed or performed in visit on 12/08/17   Iron and TIBC    Specimen: Blood   Result Value Ref Range    Iron 82 49 - 181 mcg/dL    TIBC 378 261 - 462 mcg/dL    Iron Saturation (TSAT) 22 20 - 55 %   Folate    Specimen: Blood   Result Value Ref Range    Folate >20.00 2.76 - 21.00 ng/mL   Ferritin    Specimen: Blood   Result Value Ref Range    Ferritin 55.30 17.90 - 464.00 ng/mL   Vitamin B12    Specimen: Blood   Result Value Ref Range    Vitamin B-12 298 239 -  931 pg/mL   Results for orders placed or performed in visit on 12/08/17   CBC Auto Differential    Specimen: Blood   Result Value Ref Range    WBC 5.61 3.20 - 9.80 10*3/mm3    RBC 4.14 (L) 4.37 - 5.74 10*6/mm3    Hemoglobin 12.1 (L) 13.7 - 17.3 g/dL    Hematocrit 34.5 (L) 39.0 - 49.0 %    MCV 83.3 80.0 - 98.0 fL    MCH 29.2 26.5 - 34.0 pg    MCHC 35.1 31.5 - 36.3 g/dL    RDW 12.7 11.5 - 14.5 %    RDW-SD 38.5 35.1 - 43.9 fl    MPV 9.6 8.0 - 12.0 fL    Platelets 172 150 - 450 10*3/mm3    Neutrophil % 66.4 37.0 - 80.0 %    Lymphocyte % 18.5 10.0 - 50.0 %    Monocyte % 9.6 0.0 - 12.0 %    Eosinophil % 4.8 0.0 - 7.0 %    Basophil % 0.5 0.0 - 2.0 %    Immature Grans % 0.2 0.0 - 0.5 %    Neutrophils, Absolute 3.72 2.00 - 8.60 10*3/mm3    Lymphocytes, Absolute 1.04 0.60 - 4.20 10*3/mm3    Monocytes, Absolute 0.54 0.00 - 0.90 10*3/mm3    Eosinophils, Absolute 0.27 0.00 - 0.70 10*3/mm3    Basophils, Absolute 0.03 0.00 - 0.20 10*3/mm3    Immature Grans, Absolute 0.01 0.00 - 0.02 10*3/mm3   Lactate Dehydrogenase    Specimen: Blood   Result Value Ref Range     313 - 618 U/L   Comprehensive Metabolic Panel    Specimen: Blood   Result Value Ref Range    Glucose 87 60 - 100 mg/dL    BUN 11 7 - 21 mg/dL    Creatinine 0.85 0.70 - 1.30 mg/dL    Sodium 137 137 - 145 mmol/L    Potassium 4.3 3.5 - 5.1 mmol/L    Chloride 101 95 - 110 mmol/L    CO2 28.0 22.0 - 31.0 mmol/L    Calcium 9.4 8.4 - 10.2 mg/dL    Total Protein 6.7 6.3 - 8.6 g/dL    Albumin 4.00 3.40 - 4.80 g/dL    ALT (SGPT) 32 21 - 72 U/L    AST (SGOT) 33 17 - 59 U/L    Alkaline Phosphatase 101 38 - 126 U/L    Total Bilirubin 0.5 0.2 - 1.3 mg/dL    eGFR Non African Amer 87 42 - 98 mL/min/1.73    Globulin 2.7 2.3 - 3.5 gm/dL    A/G Ratio 1.5 1.1 - 1.8 g/dL    BUN/Creatinine Ratio 12.9 7.0 - 25.0    Anion Gap 8.0 5.0 - 15.0 mmol/L     *Note: Due to a large number of results and/or encounters for the requested time period, some results have not been displayed. A complete set  of results can be found in Results Review.

## 2023-07-20 ENCOUNTER — OFFICE VISIT (OUTPATIENT)
Dept: FAMILY MEDICINE | Facility: CLINIC | Age: 33
End: 2023-07-20
Payer: MEDICAID

## 2023-07-20 VITALS
SYSTOLIC BLOOD PRESSURE: 110 MMHG | OXYGEN SATURATION: 97 % | TEMPERATURE: 97.8 F | DIASTOLIC BLOOD PRESSURE: 70 MMHG | HEART RATE: 101 BPM | RESPIRATION RATE: 16 BRPM

## 2023-07-20 DIAGNOSIS — N18.6 ESRD (END STAGE RENAL DISEASE) ON DIALYSIS (H): ICD-10-CM

## 2023-07-20 DIAGNOSIS — Z99.2 ESRD (END STAGE RENAL DISEASE) ON DIALYSIS (H): ICD-10-CM

## 2023-07-20 DIAGNOSIS — H61.21 IMPACTED CERUMEN OF RIGHT EAR: ICD-10-CM

## 2023-07-20 DIAGNOSIS — G62.9 PERIPHERAL POLYNEUROPATHY: Primary | ICD-10-CM

## 2023-07-20 PROCEDURE — 69210 REMOVE IMPACTED EAR WAX UNI: CPT | Mod: RT | Performed by: NURSE PRACTITIONER

## 2023-07-20 PROCEDURE — 99214 OFFICE O/P EST MOD 30 MIN: CPT | Mod: 25 | Performed by: NURSE PRACTITIONER

## 2023-07-20 ASSESSMENT — PAIN SCALES - GENERAL: PAINLEVEL: SEVERE PAIN (6)

## 2023-07-20 ASSESSMENT — PATIENT HEALTH QUESTIONNAIRE - PHQ9: SUM OF ALL RESPONSES TO PHQ QUESTIONS 1-9: 6

## 2023-07-20 NOTE — PROGRESS NOTES
Assessment & Plan     ICD-10-CM    1. Peripheral polyneuropathy  G62.9       2. ESRD (end stage renal disease) on dialysis (H)  N18.6     Z99.2       3. Impacted cerumen of right ear  H61.21 REMOVE IMPACTED CERUMEN        Chronic pain related to peripheral neuropathy as a result of end-stage renal disease.  Continue working with dialysis/transplant team.  Paperwork for Department of Health filled out for medical cannabis.  Large amounts of cerumen removed from right ear with lavage and then curette.  Well tolerated.     Reviewed family medicine note x2, ED note x1    Subjective     HPI     Chronic pain  Neuropathy r/t ESRD. Dialysis MWF.  Feet get quite painful. On lyrica 300mg twice daily.  Previously used Belbuca.    Ear discomfort  Oftentimes will stick his finger in his ear.  Thinks he may have scratched it a little.    Review of Systems - negative except for what's listed in the HPI      Objective    /70 (BP Location: Right arm, Patient Position: Sitting, Cuff Size: Adult Large)   Pulse 101   Temp 97.8  F (36.6  C) (Oral)   Resp 16   SpO2 97%   Physical Exam   General appearance - alert, well appearing, and in no distress  Mental status - alert, oriented to person, place, and time  Ears -left canal, TM, external ear normal.  Right shows small abrasion on the outer part of the ear.  No signs of infection.  Cerumen impaction noted.    Akil Cox, CNP    This note has been dictated using voice recognition software. Any grammatical or context distortions are unintentional and inherent to the software.

## 2023-07-20 NOTE — PATIENT INSTRUCTIONS
Here are the steps that will take place to help get you enrolled in the medical cannabis program through the [facility]:    1.  After your appointment today, I will finish your office visit note.    2.  I will go onto the [facility]'s website and submit your information.    3.  In 1-6 weeks, they will email you an application to the email address that you gave me today.    4.  After you complete their application and pay the enrollment fee, they will review.    5.  After your application has been reviewed and approved, the state will inform you and you can then contact any one of the dispensaries available.    6.  Call the dispensary and schedule a consultation with their pharmacist.  These consultations are oftentimes free.  Remember that the pharmacy will not accept debit/credit cards and that cash is oftentimes the only acceptable form of payment.    7.  Meet with the pharmacist and they will discuss your diagnosis and treatment options.  Follow-up with them regarding dosing and refills.    8.  If you have a condition managed by a specialist, then please also inform that specialist just prior to starting medical cannabis so they can follow-up on your condition appropriately.

## 2023-07-25 ENCOUNTER — TELEPHONE (OUTPATIENT)
Dept: TRANSPLANT | Facility: CLINIC | Age: 33
End: 2023-07-25
Payer: MEDICAID

## 2023-07-25 NOTE — TELEPHONE ENCOUNTER
Called patient to see where he is at with his transplant evaluation and see how I can help him.  He did not answer, but I was able to leave a VM with my direct line requesting a call back.

## 2023-08-09 ENCOUNTER — PATIENT OUTREACH (OUTPATIENT)
Dept: NEPHROLOGY | Facility: CLINIC | Age: 33
End: 2023-08-09
Payer: MEDICAID

## 2023-08-09 NOTE — PROGRESS NOTES
Dialysis Access Care Coordination: General Outreach    REASON FOR CALL:     REASON FOR CALL: Clinic Care Coordination - Chart Review (Request for CVC removal)                                  SITUATION/BACKROUND:   Pt newer to [facility] and per HD RN, just recently let them cannulate his fistula that he's had for over 1 year and is ready to have CVC removed.  Pt couldn't remember where his fistula was created or who placed his CVC.    Neph Tracking Flowsheet Last Filled Values       Final Modality Hemodialysis    Patient's Referral Dates Auto Populate Patient's Referral Dates    Dietician Referral 6/28/22    Home Care Referral 4/17/23    Diaylsis Access Type AV Fistula    Dialysis Access Site [facility]  brachiocephalic    Dialysis Access Surgery 05/11/22    Dialysis Access Surgeon [facility]    Dialysis Access Maturation --  just allowed fistula to be cannulated approx. 7/2023 per [facility] RN    Transplant Evaluation Referral 5/25/22          Dialysis History        Start End Type Center Comments    [facility]28[facility]20[facility]     12/17/2021 6/26/2023 [facility] Center [facility] ([facility]) start date per 2728                    ASSESSMENT:   Per Elli, pt had LUE brachiocephalic AVF created 5/11/22 at [facility].  Writer was unable to see who placed CVC, or when it was placed.    PLAN:     Appointments in Next Year      Oct 18, 2023 10:00 AM  (Arrive by 9:30 AM)  Adult General Eval with Herminia Valdivia NP  [facility] [place] [facility] [phone]          Follow Up:     Advised that HD RN request CVC removal with [facility].  If they did not place the CVC, and pt prefers to have removal with [facility], will get order for removal and send referral to IR.     HD RN verbalized understanding and will follow up as recommended.    FERMÍN MOORE,  RN  Dialysis Access Care Coordinator  Phone: [phone]  Pool: P_Dialysis_Access_Nurse

## 2023-08-11 ENCOUNTER — TELEPHONE (OUTPATIENT)
Dept: FAMILY MEDICINE | Facility: CLINIC | Age: 33
End: 2023-08-11
Payer: MEDICAID

## 2023-08-11 DIAGNOSIS — Z99.2 ANEMIA IN CHRONIC KIDNEY DISEASE, ON CHRONIC DIALYSIS (H): ICD-10-CM

## 2023-08-11 DIAGNOSIS — K85.91 NECROTIZING PANCREATITIS: ICD-10-CM

## 2023-08-11 DIAGNOSIS — N17.0 ACUTE KIDNEY FAILURE WITH TUBULAR NECROSIS (H): Primary | ICD-10-CM

## 2023-08-11 DIAGNOSIS — D63.1 ANEMIA IN CHRONIC KIDNEY DISEASE, ON CHRONIC DIALYSIS (H): ICD-10-CM

## 2023-08-11 DIAGNOSIS — N18.6 ANEMIA IN CHRONIC KIDNEY DISEASE, ON CHRONIC DIALYSIS (H): ICD-10-CM

## 2023-08-11 NOTE — TELEPHONE ENCOUNTER
General Call      Reason for Call:  Plan of Care    What are your questions or concerns:  Patient would like to discuss about plan of care, also to clarify regarding Nephrology and Primary. Patient would like a call back to discuss all the options with his representative. Please call and advise    Date of last appointment with provider: 5/25/23    Could we send this information to you in Particle or would you prefer to receive a phone call?:   Patient would prefer a phone call   Okay to leave a detailed message?: Yes at Cell number on file:    Telephone Information:   Mobile [phone]

## 2023-08-11 NOTE — TELEPHONE ENCOUNTER
Patient's tunneled CVC was placed at [facility] in [place] sometime in May or June of 2021. CVC is on the right side of chest.    Nurse from [facility] will fax orders to have CVC removed.    Ute Alfredo RN on 8/11/2023 at 10:14 AM

## 2023-08-14 NOTE — TELEPHONE ENCOUNTER
Pt will call clinic back in 45 min. After dialysis    Please relay below information.    Valeria Antunez RN

## 2023-08-14 NOTE — TELEPHONE ENCOUNTER
RN called patient/family and relayed provider's message. Patient/family verbalized understanding. He will reach out to Nephrology.     He would like SW on his care team again. Is a new referral necessary?    Berenice Monteiro RN, BSN  [facility] [place]

## 2023-08-14 NOTE — TELEPHONE ENCOUNTER
Clinic Care Coordination Contact    Please have patient reach out to his nephrology team to discuss specifics regarding his kidney care     Paz Shirley, DARNELL, MSW   [facility]  Care Coordination  [facility]  [phone]  8/14/2023 1:55 PM

## 2023-08-15 ENCOUNTER — TELEPHONE (OUTPATIENT)
Dept: TRANSPLANT | Facility: CLINIC | Age: 33
End: 2023-08-15
Payer: MEDICAID

## 2023-08-15 NOTE — TELEPHONE ENCOUNTER
Patient returned my call and left a VM requesting a call back. Called back at this time.  No answer but did leave a VM requesting a call back.

## 2023-08-16 NOTE — TELEPHONE ENCOUNTER
Routing to PCP    Patient would like to have care coordinator on his care team now. In the past, he was not ready to have them, but he would like them now.    Willing to place referral?    Berenice Monteiro RN

## 2023-08-18 ENCOUNTER — TELEPHONE (OUTPATIENT)
Dept: TRANSPLANT | Facility: CLINIC | Age: 33
End: 2023-08-18
Payer: MEDICAID

## 2023-08-18 ENCOUNTER — PATIENT OUTREACH (OUTPATIENT)
Dept: CARE COORDINATION | Facility: CLINIC | Age: 33
End: 2023-08-18
Payer: MEDICAID

## 2023-08-18 NOTE — PROGRESS NOTES
[facility]/Voicemail       Clinical Data: Care Coordinator Outreach  Outreach attempted x 1.  Left message on patient's voicemail with call back information and requested return call.  Plan:   Care Coordinator will try to reach patient again in 1-2 business days.    BLANE Lares [place], [facility] [place] and [facility]  [phone]

## 2023-08-18 NOTE — TELEPHONE ENCOUNTER
Returning patient call. Unable to reach him. Left  requesting call back.  Let him know if we don't touch base today, I will send him a Z[employer]ante message to set up a time that works for the two of us to talk about his evaluation and where he is at.

## 2023-08-21 NOTE — PROGRESS NOTES
Community Health Worker Initial Outreach    CHW Initial Information Gathering:  Referral Source: PCP  Preferred Hospital: [facility]  [phone]  Preferred Urgent Care: Other  Current living arrangement:: I live alone  Type of residence:: Apartment - handicap accessible  Community Resources:   Equipment Currently Used at Home: grab bar, tub/shower, wheelchair, power, shower chair  Informal Support system:: Family, Friends, Neighbors  No PCP office visit in Past Year: No  Transportation means:: Medical transport  CHW Additional Questions  If ED/Hospital discharge, follow-up appointment scheduled as recommended?: N/A  Medication changes made following ED/Hospital discharge?: N/A  MyChart active?: Yes  Patient sent Social Determinants of Health questionnaire?: No    Patient accepts: Yes. Patient scheduled for assessment with JEFFERY ONEILL on 8/22/23 at 11 am. Patient noted desire to discuss multiple issues with his care.     CHW will continue to support patient with goals through routine scheduled outreach.

## 2023-08-22 ENCOUNTER — TELEPHONE (OUTPATIENT)
Dept: TRANSPLANT | Facility: CLINIC | Age: 33
End: 2023-08-22

## 2023-08-22 NOTE — TELEPHONE ENCOUNTER
Called patient at this previously agreed upon time and unable to reach him.  VM full so unable to leave a message.

## 2023-08-24 ENCOUNTER — NURSE TRIAGE (OUTPATIENT)
Dept: NURSING | Facility: CLINIC | Age: 33
End: 2023-08-24
Payer: MEDICAID

## 2023-08-24 PROCEDURE — 99283 EMERGENCY DEPT VISIT LOW MDM: CPT | Performed by: EMERGENCY MEDICINE

## 2023-08-25 ENCOUNTER — TELEPHONE (OUTPATIENT)
Dept: TRANSPLANT | Facility: CLINIC | Age: 33
End: 2023-08-25
Payer: MEDICAID

## 2023-08-25 ENCOUNTER — NURSE TRIAGE (OUTPATIENT)
Dept: NURSING | Facility: CLINIC | Age: 33
End: 2023-08-25
Payer: MEDICAID

## 2023-08-25 ENCOUNTER — HOSPITAL ENCOUNTER (EMERGENCY)
Facility: CLINIC | Age: 33
Discharge: HOME | End: 2023-08-25
Attending: EMERGENCY MEDICINE | Admitting: EMERGENCY MEDICINE
Payer: COMMERCIAL

## 2023-08-25 VITALS
TEMPERATURE: 98.3 F | WEIGHT: 174.16 LBS | DIASTOLIC BLOOD PRESSURE: 87 MMHG | HEART RATE: 105 BPM | OXYGEN SATURATION: 95 % | RESPIRATION RATE: 18 BRPM | HEIGHT: 66 IN | SYSTOLIC BLOOD PRESSURE: 135 MMHG | BODY MASS INDEX: 27.99 KG/M2

## 2023-08-25 DIAGNOSIS — N18.6 END STAGE RENAL DISEASE (H): ICD-10-CM

## 2023-08-25 DIAGNOSIS — Z87.891 HISTORY OF TOBACCO USE: ICD-10-CM

## 2023-08-25 DIAGNOSIS — F19.10 SUBSTANCE ABUSE (H): Primary | ICD-10-CM

## 2023-08-25 DIAGNOSIS — Z01.818 PRE-TRANSPLANT EVALUATION FOR KIDNEY TRANSPLANT: ICD-10-CM

## 2023-08-25 DIAGNOSIS — N18.9 CHRONIC RENAL FAILURE: ICD-10-CM

## 2023-08-25 DIAGNOSIS — T82.9XXA COMPLICATION ASSOCIATED WITH DIALYSIS CATHETER: ICD-10-CM

## 2023-08-25 DIAGNOSIS — Z76.82 ORGAN TRANSPLANT CANDIDATE: ICD-10-CM

## 2023-08-25 PROCEDURE — 250N000013 HC RX MED GY IP 250 OP 250 PS 637: Performed by: EMERGENCY MEDICINE

## 2023-08-25 RX ORDER — OXYCODONE HYDROCHLORIDE 5 MG/1
5 TABLET ORAL ONCE
Status: COMPLETED | OUTPATIENT
Start: 2023-08-25 | End: 2023-08-25

## 2023-08-25 RX ADMIN — OXYCODONE HYDROCHLORIDE 5 MG: 5 TABLET ORAL at 02:51

## 2023-08-25 ASSESSMENT — ACTIVITIES OF DAILY LIVING (ADL)
ADLS_ACUITY_SCORE: 33
ADLS_ACUITY_SCORE: 33

## 2023-08-25 NOTE — TELEPHONE ENCOUNTER
Nurse Triage SBAR    Situation: Dialysis port concerns.     Background: Patient calling. Started feeling it about 45 minutes ago.     Assessment: Pressure near CVC catheter Above his right areola about an inch above it. Uses it for dialysis. He thinks he might has rolled it. Pt already spoke with Leila wilson and they told him to go into the ED. He wants to know if he should go to a [facility] hospital.     Protocol Recommended Disposition: Emergency Department    Recommendation: According to the protocol, Patient should go to the ED now (Or PCP Triage). Advised Patient that the patient needs to go to the ED now (Or PCP Triage). Care advice given. Patient verbalizes understanding and agrees with plan of care.     Kelly Juarez RN Nursing Advisor 8/24/2023 10:44 PM     Reason for Disposition   Nursing judgment    Protocols used: No Guideline or Reference [id number]

## 2023-08-25 NOTE — DISCHARGE INSTRUCTIONS
Surgery team evaluated you today and do not feel it would be appropriate to remove the catheter at this time as it seems to be appropriately placed.  They recommend you talk with your nephrology team to schedule being seen by interventional radiology for removal of the catheter.  Please follow-up with your nephrology team to schedule removal of your dialysis catheter.

## 2023-08-25 NOTE — TELEPHONE ENCOUNTER
"Patient calling. He was seen at [facility] ED last night for possible shifting of his CVC catheter. He received a call today telling that he needed to have everything tested so that he could be put on the transplant list.     He is concerned that there was nothing done with the dialysis catheter, and he doesn't feel mentally comfortable regarding risk for infection. He's also having physical discomfort due to cramping while he dialyzed.     He is unable to get in contact with his vascular surgery clinic at this time.     He is calling to request pain medication. He states that the pain is a constant 3/10. It was explained to him that on-call providers are not able to order narcotic analgesia outside of clinic hours.     Care advice given for patient to be seen by a provider within 3 days. Patient states that due to his transportation issues, he will go to the emergency department at [facility] in [place].     Jessy Glass RN  [facility]  August 25, 2023, 5:38 PM        Reason for Disposition   [1] MODERATE neck pain (e.g., interferes with normal activities) AND [2] present > 3 days    Additional Information   Negative: Shock suspected (e.g., cold/pale/clammy skin, too weak to stand, low BP, rapid pulse)   Negative: Difficult to awaken or acting confused (e.g., disoriented, slurred speech)   Negative: [1] Similar pain previously AND [2] it was from \"heart attack\"   Negative: [1] Similar pain previously AND [2] it was from \"angina\" AND [3] not relieved by nitroglycerin   Negative: Sounds like a life-threatening emergency to the triager   Negative: Followed a neck injury (e.g., MVA, sports, impact or collision)   Negative: Chest pain   Negative: Lymph node in the neck is swollen or painful to the touch   Negative: Sore throat is main symptom   Negative: Difficulty breathing or unusual sweating (e.g., sweating without exertion)   Negative: [1] Stiff neck (can't put chin to chest) AND [2] headache   " "Negative: [1] Stiff neck (can't put chin to chest) AND [2] fever   Negative: Weakness of an arm or hand   Negative: Problems with bowel or bladder control   Negative: Head is twisting to one side (or ask \"is it turning against your will?\")   Negative: Patient sounds very sick or weak to the triager   Negative: [1] SEVERE neck pain (e.g., excruciating, unable to do any normal activities) AND [2] not improved after 2 hours of pain medicine   Negative: [1] Fever > 100.0 F (37.8 C) AND [2] Intravenous Drug Use (IVDU)   Negative: [1] Fever > 100.0 F (37.8 C) AND [2] diabetes mellitus or weak immune system (e.g., HIV positive, cancer chemo, splenectomy, organ transplant, chronic steroids)   Negative: Numbness in an arm or hand (i.e., loss of sensation)   Negative: Tenderness or swelling of front of neck over windpipe   Negative: Rash in same area as pain (may be described as \"small blisters\")   Negative: High-risk adult (e.g., history of cancer, HIV, or IV drug use)    Protocols used: Neck Pain or [id number]    "

## 2023-08-25 NOTE — ED TRIAGE NOTES
Pt BIBA, central venous catheter on the left side and was to be removed in Monday but didn't make the appt, started having pain moving up his arm into his shoulder and neck, VSS, hx: acute renal failure d/t etoh abuse

## 2023-08-25 NOTE — ED PROVIDER NOTES
ED Provider Note  [facility]      History     Chief Complaint   Patient presents with    Catheter Problem     Central venous catheter     HPI  Dax Villareal is a 32 year old male ESRD, malnutrition, acute kidney failure with tubular necrosis, dialysis patient, cirrhosis of liver, who presents to the ER for evaluation of catheter problem.    Patient bumped his central venous catheter and the sutures tore slightly, started on 9 PM tonight.  Patient states he does not use his catheter for dialysis anymore, has been using a fistula for about a month now.  Recurrent effusion.  Patient is not anticoagulated and has no history of blood clots.      Past Medical History  Past Medical History:   Diagnosis Date    ADHD 2008    Alcohol abuse, in remission     Alcoholic cirrhosis of liver with ascites (H)     Anemia in chronic kidney disease     Chronic pain     Current smoker     End stage renal disease (H)     Hypotension, unspecified hypotension type     Malnutrition (H)     Metabolic acidosis     Necrotizing pancreatitis     Pancreatic insufficiency     Pericarditis     Recurrent pleural effusion      Past Surgical History:   Procedure Laterality Date    AV FISTULA PLACEMENT, BRACHIOCEPHALIC Left 05/11/2022    [facility]    COMBINED ESOPHAGOSCOPY, GASTROSCOPY, DUODENOSCOPY (EGD), TISSUE APPOSI N/A 09/20/2021    Procedure: ESOPHAGOGASTRODUODENOSCOPY WITH SUTURE FISTULA CLOSURE, argon plasma coagulation;  Surgeon: Jose Quigley MD;  Location: [facility] OR    ENDOSCOPIC INSERTION TUBE GASTROSTOMY N/A 07/13/2021    Procedure: Upper endoscopy, INSERTION, PEG-J TUBE and Gastropexy;  Surgeon: Rayshawn Will MD;  Location: [facility] OR    ENDOSCOPIC INSERTION TUBE GASTROSTOMY  09/13/2021    Procedure: PEG/J PLACEMENT.;  Surgeon: Rayshawn Will MD;  Location: [facility]    ENDOSCOPIC ULTRASOUND UPPER GASTROINTESTINAL TRACT (GI) N/A 07/21/2021    Procedure: ENDOSCOPIC ULTRASOUND, ESOPHAGOSCOPY / UPPER  GASTROINTESTINAL TRACT (GI) with cyst gastrostomy, dilation;  Surgeon: Guru Yecenia Chacko MD;  Location: [facility]    ENDOSCOPIC ULTRASOUND, ESOPHAGOSCOPY, GASTROSCOPY, DUODENOSCOPY (EGD), NECROSECTOMY N/A 08/11/2021    Procedure: ESOPHAGOGASTRODUODENOSCOPY (EGD) with necrosectomy, stent exchange.;  Surgeon: Amadou Beasley MD;  Location: [facility]    ESOPHAGOSCOPY, GASTROSCOPY, DUODENOSCOPY (EGD), COMBINED N/A 07/28/2021    Procedure: ESOPHAGOGASTRODUODENOSCOPY (EGD), gastrostomy apposition, Necrosectomy, stent exchange.;  Surgeon: Rayshawn Will MD;  Location: [facility]    ESOPHAGOSCOPY, GASTROSCOPY, DUODENOSCOPY (EGD), COMBINED N/A 08/06/2021    Procedure: ESOPHAGOGASTRODUODENOSCOPY (EGD) with necrosectomy, and stents exchanged;  Surgeon: Jose Quigley MD;  Location: [facility]    ESOPHAGOSCOPY, GASTROSCOPY, DUODENOSCOPY (EGD), COMBINED Left 09/13/2021    Procedure: ESOPHAGOGASTRODUODENOSCOPY (EGD), GASTROSTOMY TUBE REMOVAL, FISTULAR CLOSURE ENDOSCOPIC.;  Surgeon: Rayshawn Will MD;  Location: [facility]    IR ABSCESS TUBE CHANGE  07/14/2021    IR NG TUBE PLACEMENT REQ RAD & FLUORO  10/26/2021    IR PARACENTESIS  07/21/2021    IR SINOGRAM INJECTION DIAGNOSTIC  08/25/2021    IR THORACENTESIS  02/05/2022    ORTHOPEDIC SURGERY Right     fracture repair    REPLACE GASTROSTOMY TUBE, PERCUTANEOUS N/A 07/28/2021    Procedure: gastro-jejunostomy tube exchange;  Surgeon: Rayshawn Will MD;  Location: [facility] OR     acetaminophen (TYLENOL) 325 MG/10.15ML solution  ALPRAZolam (XANAX) 0.5 MG tablet  amphetamine-dextroamphetamine (ADDERALL XR) 30 MG 24 hr capsule  amphetamine-dextroamphetamine (ADDERALL) 20 MG tablet  amphetamine-dextroamphetamine (ADDERALL) 20 MG tablet  amphetamine-dextroamphetamine (ADDERALL) 20 MG tablet  amphetamine-dextroamphetamine (ADDERALL) 20 MG tablet  amylase-lipase-protease (CREON 24) 86840-45574 units CPEP per EC capsule  Cetaphil Moisturizing (CETAPHIL) external  "lotion  diclofenac (VOLTAREN) 1 % topical gel  DULoxetine (CYMBALTA) 60 MG capsule  folic acid (FOLVITE) 1 MG tablet  Guar Gum (FIBER MODULAR, NUTRISOURCE FIBER,) packet  lactulose (CHRONULAC) 10 GM/15ML solution  lidocaine (XYLOCAINE) 5 % external ointment  midodrine (PROAMATINE) 5 MG tablet  naloxone (NARCAN) 4 MG/0.1ML nasal spray  nortriptyline (PAMELOR) 25 MG capsule  pantoprazole (PROTONIX) 40 MG EC tablet  potassium chloride (KLOR-CON) 20 MEQ packet  pregabalin (LYRICA) 300 MG capsule  prochlorperazine (COMPAZINE) 5 MG tablet  rOPINIRole (REQUIP) 0.25 MG tablet  sevelamer carbonate, RENVELA, 0.8 GM PACK Packet  silver nitrate (ARZOL) 75-25 % miscellaneous  sodium bicarbonate 325 MG tablet  tiZANidine (ZANAFLEX) 2 MG tablet  torsemide (DEMADEX) 20 MG tablet  triamcinolone (KENALOG) 0.1 % external cream  vitamin (B COMPLEX) capsule      No Known Allergies  Family History  Family History   Problem Relation Age of Onset    Attention Deficit Disorder Mother     Alcoholism Mother     Alcoholism Father     Alcoholism Brother     Alcoholism Sister      Social History   Social History     Tobacco Use    Smoking status: Some Days     Packs/day: 0.25     Types: Cigarettes    Smokeless tobacco: Never    Tobacco comments:     8/24/2021 Patient did not want to discuss quitting but would like 4 mg lozenge to use during admission. Patient also accepted \"Quitting for Good\" workbook   Vaping Use    Vaping Use: Never used    Passive vaping exposure: Yes   Substance Use Topics    Alcohol use: Not Currently     Comment: \"no alcohol for over 1 year ago\" per pt on 5/12/22    Drug use: No         A medically appropriate review of systems was performed with pertinent positives and negatives noted in the HPI, and all other systems negative.    Physical Exam   BP: 135/87  Pulse: 105  Temp: 98.3  F (36.8  C)  Resp: 18  Height: 167.7 cm (5' 6.02\")  Weight: 79 kg (174 lb 2.6 oz)  SpO2: 95 %  Physical Exam  Vitals and nursing note " reviewed.   Constitutional:       General: He is not in acute distress.     Appearance: He is well-developed.   HENT:      Head: Normocephalic.      Right Ear: External ear normal.      Left Ear: External ear normal.      Nose: Nose normal.   Eyes:      Extraocular Movements: Extraocular movements intact.      Conjunctiva/sclera: Conjunctivae normal.   Cardiovascular:      Comments: Right upper chest tunneled dialysis catheter.  No apparent bleeding or drainage from the site.  Pulmonary:      Effort: Pulmonary effort is normal. No respiratory distress.   Abdominal:      General: Abdomen is flat. There is no distension.   Musculoskeletal:         General: No deformity. Normal range of motion.      Cervical back: Normal range of motion and neck supple.   Skin:     General: Skin is warm and dry.   Neurological:      Mental Status: He is alert. Mental status is at baseline.      Comments: Oriented   Psychiatric:         Mood and Affect: Mood normal.         Behavior: Behavior normal.         ED Course, Procedures, & Data      Procedures                   No results found for any visits on 08/25/23.  Medications - No data to display  Labs Ordered and Resulted from Time of ED Arrival to Time of ED Departure - No data to display  No orders to display          Medical Decision Making  The patient's presentation is strongly suggestive of low complexity (an acute and uncomplicated illness or injury).    The patient's evaluation involved:  review of external note(s) from 3+ sources (Most recent H&P in addition to clinic and ED note)  review of 2 test result(s) ordered prior to this encounter (Most recent BMP and CBC)  Management discussed with another healthcare provider on call general surgery    The patient's management involved only low risk treatment.      Assessment & Plan    32-year-old male presents to us for evaluation of his tunneled dialysis catheter and requesting removal.  There is no active drainage or apparent  injury to the site.  He was evaluated by general surgery who do not feel this needs to be removed tonight.  The recommendation is for him to follow-up with the nephrology team to schedule removal by most likely interventional radiology.    I have reviewed the nursing notes. I have reviewed the findings, diagnosis, plan and need for follow up with the patient.    New Prescriptions    No medications on file       Final diagnoses:   Complication associated with dialysis catheter     I, Ailyn Diez, am serving as a trained medical scribe to document services personally performed by Viktor Mendoza DO, based on the provider's statements to me.     I, Viktor Mendoza DO, was physically present and have reviewed and verified the accuracy of this note documented by Ailyn Diez.    VIKTOR MENDOZA DO  formerly [facility] EMERGENCY DEPARTMENT  8/24/2023     Viktor Mendoza DO  08/25/23 0217

## 2023-08-25 NOTE — TELEPHONE ENCOUNTER
Returned patient's call.  He feels that many things have changed since he was last evaluated here with us and would like to come back in for a full evaluation. Will ensure he has coverage and then place the orders.

## 2023-08-30 ENCOUNTER — TELEPHONE (OUTPATIENT)
Dept: FAMILY MEDICINE | Facility: CLINIC | Age: 33
End: 2023-08-30
Payer: MEDICAID

## 2023-08-30 DIAGNOSIS — T82.9XXA DIALYSIS COMPLICATION: Primary | ICD-10-CM

## 2023-08-30 NOTE — TELEPHONE ENCOUNTER
Routing to clinic care coordination       Pt calling with frustration    Pt has not gotten a ride to dialysis this week and is concerned when he will get his next ride     Pt is wanting a PCA and has for years, pt states his food support he should be receiving is not being received     RN noticed similar topics in 8/22/23 outreach conversation.    Please review/advise    Valeria Antunez RN

## 2023-08-31 ENCOUNTER — PATIENT OUTREACH (OUTPATIENT)
Dept: CARE COORDINATION | Facility: CLINIC | Age: 33
End: 2023-08-31
Payer: MEDICAID

## 2023-09-01 ENCOUNTER — PATIENT OUTREACH (OUTPATIENT)
Dept: CARE COORDINATION | Facility: CLINIC | Age: 33
End: 2023-09-01
Payer: MEDICAID

## 2023-09-01 SDOH — HEALTH STABILITY: PHYSICAL HEALTH
ON AVERAGE, HOW MANY DAYS PER WEEK DO YOU ENGAGE IN MODERATE TO STRENUOUS EXERCISE (LIKE A BRISK WALK)?: PATIENT DECLINED

## 2023-09-01 SDOH — ECONOMIC STABILITY: FOOD INSECURITY: WITHIN THE PAST 12 MONTHS, THE FOOD YOU BOUGHT JUST DIDN'T LAST AND YOU DIDN'T HAVE MONEY TO GET MORE.: NEVER TRUE

## 2023-09-01 SDOH — ECONOMIC STABILITY: TRANSPORTATION INSECURITY
IN THE PAST 12 MONTHS, HAS LACK OF TRANSPORTATION KEPT YOU FROM MEETINGS, WORK, OR FROM GETTING THINGS NEEDED FOR DAILY LIVING?: YES

## 2023-09-01 SDOH — ECONOMIC STABILITY: TRANSPORTATION INSECURITY
IN THE PAST 12 MONTHS, HAS THE LACK OF TRANSPORTATION KEPT YOU FROM MEDICAL APPOINTMENTS OR FROM GETTING MEDICATIONS?: YES

## 2023-09-01 SDOH — ECONOMIC STABILITY: FOOD INSECURITY: WITHIN THE PAST 12 MONTHS, YOU WORRIED THAT YOUR FOOD WOULD RUN OUT BEFORE YOU GOT MONEY TO BUY MORE.: NEVER TRUE

## 2023-09-01 SDOH — HEALTH STABILITY: PHYSICAL HEALTH: ON AVERAGE, HOW MANY MINUTES DO YOU ENGAGE IN EXERCISE AT THIS LEVEL?: PATIENT DECLINED

## 2023-09-01 SDOH — ECONOMIC STABILITY: INCOME INSECURITY: IN THE LAST 12 MONTHS, WAS THERE A TIME WHEN YOU WERE NOT ABLE TO PAY THE MORTGAGE OR RENT ON TIME?: NO

## 2023-09-01 ASSESSMENT — SOCIAL DETERMINANTS OF HEALTH (SDOH)
DO YOU BELONG TO ANY CLUBS OR ORGANIZATIONS SUCH AS CHURCH GROUPS UNIONS, FRATERNAL OR ATHLETIC GROUPS, OR SCHOOL GROUPS?: NO
HOW OFTEN DO YOU ATTEND CHURCH OR RELIGIOUS SERVICES?: PATIENT DECLINED
IN A TYPICAL WEEK, HOW MANY TIMES DO YOU TALK ON THE PHONE WITH FAMILY, FRIENDS, OR NEIGHBORS?: TWICE A WEEK
HOW OFTEN DO YOU GET TOGETHER WITH FRIENDS OR RELATIVES?: PATIENT DECLINED
HOW HARD IS IT FOR YOU TO PAY FOR THE VERY BASICS LIKE FOOD, HOUSING, MEDICAL CARE, AND HEATING?: SOMEWHAT HARD
ARE YOU MARRIED, WIDOWED, DIVORCED, SEPARATED, NEVER MARRIED, OR LIVING WITH A PARTNER?: PATIENT DECLINED
HOW OFTEN DO YOU ATTENT MEETINGS OF THE CLUB OR ORGANIZATION YOU BELONG TO?: NEVER

## 2023-09-01 ASSESSMENT — ACTIVITIES OF DAILY LIVING (ADL): DEPENDENT_IADLS:: CLEANING;COOKING;LAUNDRY;SHOPPING;MEAL PREPARATION;TRANSPORTATION

## 2023-09-01 NOTE — TELEPHONE ENCOUNTER
Clinic Care Coordination Contact    Please see new  encounter     DARNELL Heart, MSW   [facility]  Care Coordination  [facility]  [phone]  9/1/2023 10:13 AM    FAMILY HISTORY:  No pertinent family history in first degree relatives

## 2023-09-01 NOTE — PROGRESS NOTES
"Clinic Care Coordination Contact  Clinic Care Coordination Contact  OUTREACH    Referral Information: SW initial phone assessment  Referral Source: PCP      Chief Complaint   Patient presents with    Clinic Care Coordination - Initial     SW        Universal Utilization: Patient at [facility] ED 8/25/2023 for complication with dialysis catheter, at [facility] 8/25/2023 for chest wall pain, at [facility] 8/26/2023 for chronic pain, need for assistance with picking up related medications at pharmacy.  Per 8/26/2023 ED report: He reports that he has chronic pain and needs meds in order to manage. He is unable to  his prescription and would like to be seen again since his ride won't be here for sometime.      Clinic Utilization  No PCP office visit in Past Year: No  Utilization      Hospital Admissions  0             ED Visits  4             No Show Count (past year)  6                    Current as of: 8/31/2023  9:45 PM              Clinical Concerns:  Patient has dx of ESRD.  Recent CC referral for patient, please also see recent telephone call with triage.  CC referral for patient last week in which patient declined CC/resources until he spoke with the [facility] and was to follow up with his county worker.  CC referral and assessment 6/5/2023, patient did not wish to discuss his concerns, he was working with his county worker.  Patient is working with kidney transplant care coordinator, he has pre transplant assessment appt in mid September.  Patient is also in frequent contact with the formerly [facility] regarding his current concerns with PCA and previous concerns at the TCU.  Patient also has a dialysis care coordinator if needed.      Patient had expressed recent concerns regarding transportation  and PCA with triage.  SW called patient to discuss.  Patient reports being approved for PCA several months ago \"but this has not happened\".  He stated he continues to wait for an available PCA.  Patient stated his formerly [facility] informed he " "\"should get a \" as this is unacceptable.  SW informed of the state of PCA services currently, that there are very limited PCA resources.  SW offered to assist with a list of PCA agencies.  Patient stated he has called many and they do not availability.  SW stated unfortunately,  this may be the case currently.  MAI sent a list of PCA agencies for patient [facility].      Patient stated desire for PT and [facility] dental referral, stated he will call and schedule PCP visit to discuss. Patient expressed concern his care team members are not \"in sync\" with his care needs.  SW suggested patient discuss this at upcoming transplant assessment appt and to schedule PCP appt to discuss    Patient states he cannot  groceries from [facility] as does not have transportation to do so.  He expressed being very frustrated that his medical transport will not bring him.  We briefly discussed [employer] for non medical needs.  This application link sent in [employer].  SW informed that a face to face visit may be needed for provider to complete their portion.  MAI discussed that [employer] shelf near him does deliver, SW provided that information in [place].      Patient stated need to schedule with psychiatry, SW had provided this # recently. Patient states he has not called as has not had time, but plans to find time.     Patient reports being very upset with medical transportation.  He reports having filed grievances against 2 companies.  He is using a new company now who did not pick him up for dialysis once last week and twice this week    Patient expressed continued need for paperwork, assistance with coordination or appts and resources. MAI had discussed [facility] worker today and last week.  Last week, patient wished to wait to possibly pursue until he spoke with the [facility].  SW inquired today, he does not wish to move forward at that time with [facility] worker.      Did not address care gaps due to multiple concerns " noted above.  SW did not have opportunity to address mental health, patient was seeing a psychologist in June but no noted appts since.    Current Medical Concerns:   Patient Active Problem List   Diagnosis    Acute kidney failure with tubular necrosis (H)    Necrotizing pancreatitis    ESRD (end stage renal disease) on dialysis (H)    Cirrhosis of liver (H)    Muscle weakness    Contracture of joint of foot, unspecified laterality    DIMITRI (generalized anxiety disorder)    Alcohol abuse, in remission    Chronic pain    Pancreatic insufficiency    Anemia in chronic kidney disease    Acquired bilateral pes cavus       Current Behavioral Concerns:  anxious regarding care needs, frustrated with multiple psychosocial needs, will need to assess further on future call    Education Provided to patient: [employer] [employer], they deliver to those with disabilities; briefly [employer], send list of PCA agencies for patient to call and inquire on PCA services      Pain  Pain (GOAL):: Yes  Health Maintenance Reviewed:    Health Maintenance Due   Topic Date Due    YEARLY PREVENTIVE VISIT  Never done    HF ACTION PLAN  Never done    MICROALBUMIN  Never done    PARATHYROID  Never done    ADVANCE CARE PLANNING  Never done    DEPRESSION ACTION PLAN  Never done    HEPATITIS A IMMUNIZATION (1 of 2 - Risk 2-dose series) Never done    HEPATITIS B IMMUNIZATION (1 of 3 - Risk Dialysis 4-dose series) Never done    URINE DRUG SCREEN  12/09/2021    COVID-19 Vaccine (4 - Pfizer risk series) 03/20/2023    LIPID  06/28/2023    NICOTINE/TOBACCO CESSATION COUNSELING Q 1 YR  07/07/2023    BMP  08/22/2023    INFLUENZA VACCINE (1) 09/01/2023    HEMOGLOBIN  11/22/2023      Clinical Pathway: None    Medication Management:  Medication review status: Did not review medications, patient would prefer to do with his provider     Functional Status:  Dependent ADLs:: Independent, Wheelchair-independent  Dependent IADLs:: Cleaning, Cooking, Laundry,  Shopping, Meal Preparation, Transportation  Bed or wheelchair confined:: No  Mobility Status: Independent w/Device  Fallen 2 or more times in the past year?: No  Any fall with injury in the past year?: No    Living Situation:  Current living arrangement:: I live alone  Type of residence:: Apartment - handicap accessible    Lifestyle & Psychosocial Needs:    Social Determinants of Health     Tobacco Use: High Risk (9/1/2023)    Patient History     Smoking Tobacco Use: Some Days     Smokeless Tobacco Use: Never     Passive Exposure: Not on file   Alcohol Use: Not At Risk (6/5/2023)    AUDIT-C     Frequency of Alcohol Consumption: Never     Average Number of Drinks: Patient does not drink     Frequency of Binge Drinking: Never   Financial Resource Strain: Medium Risk (9/1/2023)    Overall Financial Resource Strain (CARDIA)     Difficulty of Paying Living Expenses: Somewhat hard   Food Insecurity: No Food Insecurity (9/1/2023)    Hunger Vital Sign     Worried About Running Out of Food in the Last Year: Never true     Ran Out of Food in the Last Year: Never true   Recent Concern: Food Insecurity - Food Insecurity Present (6/5/2023)    Hunger Vital Sign     Worried About Running Out of Food in the Last Year: Sometimes true     Ran Out of Food in the Last Year: Never true   Transportation Needs: Unmet Transportation Needs (9/1/2023)    PRAPARE - Transportation     Lack of Transportation (Medical): Yes     Lack of Transportation (Non-Medical): Yes   Physical Activity: Unknown (9/1/2023)    Exercise Vital Sign     Days of Exercise per Week: Patient refused     Minutes of Exercise per Session: Patient refused   Recent Concern: Physical Activity - Inactive (6/5/2023)    Exercise Vital Sign     Days of Exercise per Week: 0 days     Minutes of Exercise per Session: 0 min   Stress: Stress Concern Present (9/1/2023)    [employer] [employer] - Occupational Stress Questionnaire     Feeling of Stress : Rather much    Social Connections: Unknown (9/1/2023)    Social Connection and Isolation Panel [NHANES]     Frequency of Communication with Friends and Family: Twice a week     Frequency of Social Gatherings with Friends and Family: Patient refused     Attends Hoahaoism Services: Patient refused     Active Member of Clubs or Organizations: No     Attends Club or Organization Meetings: Never     Marital Status: Patient refused   Intimate Partner Violence: Not At Risk (6/5/2023)    Humiliation, Afraid, Rape, and Kick questionnaire     Fear of Current or Ex-Partner: No     Emotionally Abused: No     Physically Abused: No     Sexually Abused: No   Depression: Not at risk (7/20/2023)    PHQ-2     PHQ-2 Score: 1   Housing Stability: Low Risk  (9/1/2023)    Housing Stability Vital Sign     Unable to Pay for Housing in the Last Year: No     Number of Places Lived in the Last Year: 2     Unstable Housing in the Last Year: No   Recent Concern: Housing Stability - High Risk (6/5/2023)    Housing Stability Vital Sign     Unable to Pay for Housing in the Last Year: Yes     Number of Places Lived in the Last Year: 2     Unstable Housing in the Last Year: No     Diet:: Other  Inadequate nutrition (GOAL):: Yes  Tube Feeding: No  Inadequate activity/exercise (GOAL):: Yes  Significant changes in sleep pattern (GOAL): Yes  Transportation means:: Medical transport     Hoahaoism or spiritual beliefs that impact treatment:: No  Mental health DX:: Yes  Mental health DX how managed:: Outpatient Counseling, Medication (PCP request for patient to establish with psychiatry, SW has provided this #)  Mental health management concern (GOAL):: Yes  Chemical Dependency Status: Past Concern  Chemical Dependency Management: Other (see comment) (uncertain)  Informal Support system:: Family, Friends, Neighbors        Resources and Interventions: [employer], they deliver to those with disabilities; briefly [employer], send list of PCA agencies for  patient to call and inquire on PCA services      Current Resources: [facility] Worker, Other (see comment) (approved for PCA, wtg for available PCA; [facility]--picks up groceries; food resources-states he has not received; medical transport, dialysis; nephrology)     Community Resources: County Worker, Other (see comment) (approved for PCA, wtg for available PCA; [facility]--picks up groceries; food resources-states he has not received; medical transport, dialysis; nephrology)  Supplies Currently Used at Home: Wound Care Supplies, Gloves, Wipes, Other  Equipment Currently Used at Home: grab bar, tub/shower, wheelchair, power, shower chair  Employment Status: disabled     Advance Care Plan/Directive  Advanced Care Plans/Directives on file:: No  Advanced Care Plan/Directive Status: Declined Further Information    Referrals Placed: patient requested PT and dental referral, he will call and sched PCP visit to discuss; SW will assist with PCA agencies; discussed [facility])     Care Plan:  Care Plan: Mental Health       Problem: Needs to schedule psyhiatry appt       Goal: Call and schedule psychiatry appt       Start Date: 8/31/2023 Expected End Date: 1/30/2024    Priority: High    Note:     Barriers: need to schedule psychiatry appt for medication management   Strengths: has # to call to schedule appt  Patient expressed understanding of goal: yes  Action steps to achieve this goal:  1. I will find time to call and schedule psychiatry appt  2. I will request earliest available   3. I will discuss my current needs   4. I will arrange medical transportation for appt                            Care Plan: Food resources       Problem: Difficulty obtaining food resources due to limited transportation       Goal: I will call and inquire on grocery delivery at [facility]       Start Date: 9/1/2023 Expected End Date: 1/30/2024    This Visit's Progress: 0%    Priority: Medium    Note:     Barriers:  transportation is limited, cannot obtain groceries consistently   Strengths: SW discussed and provided # for [employer] food shelf, which delivers groceries  Patient expressed understanding of goal: yes  Action steps to achieve this goal:  1. I will find time to call [employer]   2. I will schedule phone appt and my need for grocery delivery   3. I will coordinate grocery delivery                             Care Plan: Discussion with providers to ensure all are in sync       Problem: Concern that all providers may not be in sync with his care       Goal: I will discuss my current needs, ensure my care team is in sync with my care needs       Start Date: 9/1/2023 Expected End Date: 10/2/2023    This Visit's Progress: 10%    Priority: High    Note:     Barriers: concern all care team members are not in sync with his care needs   Strengths: will discuss above at pre transplant assessment visit in September, will call and schedule provider visit to update   Patient expressed understanding of goal:   Action steps to achieve this goal:yes  1. I will find time to call and schedule provider appt  2. I will arrange medical transport  3. I will discuss my current needs                               Patient/Caregiver understanding: Patient will call PCA agencies and inquire on services.  Patient will call and schedule appt with providers to discuss current needs, ensure all are in sync.  Patient will call [employer] and inquire on grocery delivery       Future Appointments                In 1 month Herminia Valdivia NP [facility] [place]            Plan:   1)  Patient will call PCA agencies and inquire on services.  Patient will call and schedule appt with providers to discuss current needs, ensure all are in sync.  Patient will call [employer] and inquire on grocery delivery    2) SW will send introduction letter and care plan   3) MAI will update PCP and call in 1  month     Paz Shirley, DARNELL, MSW   [facility]  Care Coordination  [facility]  [phone]  9/1/2023 12:42 PM

## 2023-09-01 NOTE — LETTER
[facility] CARE COORDINATION    [address] [address]     September 1, 2023    Dax Villareal  [address]   [place] [address]      Dear Dax,    I am a  clinic care coordinator who works with Kole Gomez MD with the [facility]. I wanted to thank you for spending the time to talk with me.  Below is a description of clinic care coordination and how I can further assist you.       The goal of clinic care coordination is to help you manage your health and improve access to the health care system. Our team works alongside your provider to assist you in determining your health and social needs. We can help you obtain health care and community resources, providing you with necessary information and education. We can work with you through any barriers and develop a care plan that helps coordinate and strengthen the communication between you and your care team.  Our services are voluntary and are offered without charge to you personally.    As discussed today, I am sending information on the [employer], they deliver groceries.      [employer]:     [employer] Pick-Up & Delivery Options  Shop Inside- Browse and select your own produce, dairy, protein, pantry staples, and personal care items based on your needs.  Fast Curbside Pickup - all of the above items are pre-packed for you and ready to go and include a handful of your special requests. Day and evening appointments are available for curbside pickups.  Delivery available for seniors and disabled adults as well as school referrals    How to set up an appointment:    BY PHONE - [phone]. (espanol - [phone]) -   ONLINE appointment request form - english    I was uncertain if you were aware of a non medical transport option:    [employer]  [email or web address]/[email or web address]-Home/[email or web address]-Application-Form.[email or web address]    A face to face visit may be  required by your provider to complete their portion    PCA agencies:       [facility]  [phone]    [facility]   [phone]    [facility]  [phone]    [employer]  [phone]    [place] [employer]  Personal Care Assistant (PCA)  [phone]    [facility]  [phone]        Please feel free to contact me with any questions or concerns regarding care coordination and what we can offer.      We are focused on providing you with the highest-quality healthcare experience possible.    Sincerely,     Paz Shirley, TISHAW, MSW   [facility]  Care Coordination  [facility]  [phone]  9/1/2023 11:35 AM     Enclosed: I have enclosed a copy of a 24 Hour Access Plan. This has helpful phone numbers for you to call when needed. Please keep this in an easy to access place to use as needed.

## 2023-09-03 ENCOUNTER — HEALTH MAINTENANCE LETTER (OUTPATIENT)
Age: 33
End: 2023-09-03

## 2023-09-06 ENCOUNTER — TELEPHONE (OUTPATIENT)
Dept: TRANSPLANT | Facility: CLINIC | Age: 33
End: 2023-09-06
Payer: MEDICAID

## 2023-09-06 NOTE — TELEPHONE ENCOUNTER
Returned patient's call.  He left a VM on my phone asking about his attendance at [facility].  He has missed several treatments due to not being picked up by his medical ride.  He is worried that this will reflect poorly on him with his evaluation coming up. Let him know that we do look at dialysis attendance/compliance but ultimately if it is an issue of getting a ride there, then we would rather reach out to sw at dialysis and see what we can do to help you get there with a ride service that is dependable.

## 2023-09-28 DIAGNOSIS — F90.9 ATTENTION DEFICIT HYPERACTIVITY DISORDER (ADHD), UNSPECIFIED ADHD TYPE: ICD-10-CM

## 2023-09-28 NOTE — TELEPHONE ENCOUNTER
RN received call from patient.    Patient requesting refills on his adderall and xanax.    Patient states he had a psychology appointment and it needed to be rescheduled.    Patient also requesting appointment  prior to him being evaluated for a kidney transplant.    RN assisted patient in scheduling appointment.    Request routed to Refill pool.    Phil Daniel RN, BSN, PHN  [facility]

## 2023-09-29 RX ORDER — DEXTROAMPHETAMINE SACCHARATE, AMPHETAMINE ASPARTATE, DEXTROAMPHETAMINE SULFATE AND AMPHETAMINE SULFATE 5; 5; 5; 5 MG/1; MG/1; MG/1; MG/1
TABLET ORAL
Qty: 60 TABLET | Refills: 0 | Status: SHIPPED | OUTPATIENT
Start: 2023-10-05 | End: 2023-10-11

## 2023-09-29 RX ORDER — DEXTROAMPHETAMINE SACCHARATE, AMPHETAMINE ASPARTATE MONOHYDRATE, DEXTROAMPHETAMINE SULFATE AND AMPHETAMINE SULFATE 7.5; 7.5; 7.5; 7.5 MG/1; MG/1; MG/1; MG/1
30 CAPSULE, EXTENDED RELEASE ORAL DAILY
Qty: 30 CAPSULE | Refills: 0 | Status: SHIPPED | OUTPATIENT
Start: 2023-10-05 | End: 2024-01-30

## 2023-10-10 ASSESSMENT — PATIENT HEALTH QUESTIONNAIRE - PHQ9
SUM OF ALL RESPONSES TO PHQ QUESTIONS 1-9: 11
SUM OF ALL RESPONSES TO PHQ QUESTIONS 1-9: 11
10. IF YOU CHECKED OFF ANY PROBLEMS, HOW DIFFICULT HAVE THESE PROBLEMS MADE IT FOR YOU TO DO YOUR WORK, TAKE CARE OF THINGS AT HOME, OR GET ALONG WITH OTHER PEOPLE: EXTREMELY DIFFICULT

## 2023-10-10 NOTE — COMMUNITY RESOURCES LIST (ENGLISH)
10/10/2023   [facility] [employer]  N/A  For questions about this resource list or additional care needs, please contact your primary care clinic or care manager.  Phone: [phone]   Email: N/A   Address: [address] [place] [address]   Hours: N/A        Financial Stability       Rent and mortgage payment assistance  1  Neighborhood Assistance [employer] - Home Save Program Distance: 5.14 miles      Phone/Virtual   [address] [place] [address]  Language: English, Surinamese  Hours: Mon - Fri 9:00 AM - 5:00 PM  Fees: Free   Phone: (205) 807-3535 Email: services@[employer] Website: https://www.[employer]     2  [employer] Distance: 9.45 miles      Phone/Virtual   [address] [place] [address]  Language: English  Hours: Mon - Fri 9:00 AM - 5:00 PM  Fees: Free   Phone: (845) 425-1487 Email: jcfuotdgi8fxgrorz@[employer] Website: https://vwgmmobnv8bynawli.org/          Food and Nutrition       Food pantry  3  [facility] Food Shelf Distance: 0.69 miles      Pickup   [address] [place] [address]  Language: English, Surinamese  Hours: Mon - Fri Appt. Only  Fees: Free   Phone: (758) 272-8775 Email: foodshelf@[employer] Website: http://www.[employer].org     4  [facility] - Food Distribution Distance: 1.7 miles      Pickup   [address] [place] [address]  Language: English  Hours: Fri 5:00 PM - 7:00 PM  Fees: Free   Phone: (760) 155-1095 Email: office@[employer].org Website: http://www.[employer].org     SNAP application assistance  5  [employer] -   Family Wellness (AIFW) Distance: 4.75 miles      In-Person, Phone/Virtual   [address] [place] [address]  Language: Slovenian, Thai, English, Gujarati, Vilma, Welsh, Belarusian, Romansh, Romansh, Czech  Hours: Mon - Wed 9:00 AM - 5:00 PM , Thu 12:00 PM - 6:00 PM , Fri 9:00 AM - 5:00 PM , Sun 10:30 AM - 2:00  PM Appt. Only  Fees: Free   Phone: (716) 198-3957 Email: info@[employer] Website: https://www.[employer].org/     6  [employer]  [employer] ([place]) Distance: 6.08 miles      In-Person, Phone/Virtual   [address] [place] [address]  Language: English, Hmong  Hours: Mon - Fri 8:30 AM - 4:30 PM  Fees: Free   Phone: (493) 699-3532 Ext.1 Email: info@[employer] Website: https://www.[employer]inder/     Soup kitchen or free meals  7  [facility] - Family Table Meal Distance: 2.46 miles      Pick   [address] [place] [address]  Language: English  Hours: Sat 11:30 AM - 12:30 PM  Fees: Free   Phone: (113) 671-2688 Email: rihcard@[facility] Centerunbound technologies Website: http://www.[facility].[employer]ending/     8  [facility] - [facility] Community Supper Distance: 2.51 miles      In-Person   [address] [place] [address]  Language: English  Hours: Wed 5:15 PM - 6:00 PM  Fees: Free   Phone: (964) 171-7214 Email: info@[facility].org Website: http://www.[facility].org/          Transportation       Free or low-cost transportation  9  [facility] Distance: 9.91 miles      In-Person   [address] [place] [address]  Language: English  Hours: Mon - Wed 9:30 AM - 12:00 PM , Mon - Wed 1:00 PM - 2:00 PM Appt. Only  Fees: Free   Phone: (536) 143-3770 Email: info@[employer] Website: http://www.saintolaf.org/     10  [facility] - Community Bus Loop Distance: 10.12 miles      In-Person   [address] [place] [address]  Language: English  Hours: Mon - Fri 9:00 AM - 5:00 PM  Fees: Free   Phone: (154) 981-5587 Email: info@[employer] Website: https://www.[employer]/     Transportation to medical appointments  11  [facility]   Family Wellness (AIFW) Distance: 4.75 miles      In-Person   [address] [place] [address]  Language: Uzbek, Armenian, English, Gujarati, Vilma, Lithuanian, Sami, Tajik, Romanian, Icelandic  Hours: Mon -  Wed 9:00 AM - 5:00 PM , Thu 12:00 PM - 6:00 PM , Fri 9:00 AM - 5:00 PM , Sun 10:30 AM - 2:00 PM Appt. Only  Fees: Free   Phone: (492) 822-9648 Email: info@[employer] Website: https://www.oDesk/     12  [employer] Distance: 9.06 miles      In-Person   [address] [address]  Language: English  Hours: Mon - Thu 6:00 AM - 6:00 PM , Fri 6:00 AM - 5:00 PM  Fees: Insurance, Self Pay   Phone: (508) 248-1463 Email: office@[employer] Website: https://www.[employer]/          Important Numbers & Websites       Emergency Services   911  City Services   311  Poison Control   (465) 419-3215  Suicide Prevention Lifeline   (256) 407-6027 (TALK)  Child Abuse Hotline   (740) 738-4322 (4-A-Child)  Sexual Assault Hotline   (572) 970-5977 (HOPE)  National Runaway Safeline   (395) 648-3479 (RUNAWAY)  All-Options Talkline   (893) 768-6572  Substance Abuse Referral   (581) 588-3492 (HELP)

## 2023-10-11 ENCOUNTER — OFFICE VISIT (OUTPATIENT)
Dept: FAMILY MEDICINE | Facility: CLINIC | Age: 33
End: 2023-10-11
Payer: COMMERCIAL

## 2023-10-11 VITALS
RESPIRATION RATE: 28 BRPM | TEMPERATURE: 98.1 F | WEIGHT: 174 LBS | HEART RATE: 108 BPM | SYSTOLIC BLOOD PRESSURE: 121 MMHG | HEIGHT: 66 IN | DIASTOLIC BLOOD PRESSURE: 76 MMHG | OXYGEN SATURATION: 99 % | BODY MASS INDEX: 27.97 KG/M2

## 2023-10-11 DIAGNOSIS — K74.60 CIRRHOSIS OF LIVER WITHOUT ASCITES, UNSPECIFIED HEPATIC CIRRHOSIS TYPE (H): ICD-10-CM

## 2023-10-11 DIAGNOSIS — G89.4 CHRONIC PAIN SYNDROME: ICD-10-CM

## 2023-10-11 DIAGNOSIS — Z13.9 ENCOUNTER FOR SCREENING INVOLVING SOCIAL DETERMINANTS OF HEALTH (SDOH): ICD-10-CM

## 2023-10-11 DIAGNOSIS — N18.6 ANEMIA IN CHRONIC KIDNEY DISEASE, ON CHRONIC DIALYSIS (H): ICD-10-CM

## 2023-10-11 DIAGNOSIS — Z99.2 ANEMIA IN CHRONIC KIDNEY DISEASE, ON CHRONIC DIALYSIS (H): ICD-10-CM

## 2023-10-11 DIAGNOSIS — I95.1 ORTHOSTATIC HYPOTENSION: ICD-10-CM

## 2023-10-11 DIAGNOSIS — D63.1 ANEMIA IN CHRONIC KIDNEY DISEASE, ON CHRONIC DIALYSIS (H): ICD-10-CM

## 2023-10-11 DIAGNOSIS — F41.1 GAD (GENERALIZED ANXIETY DISORDER): ICD-10-CM

## 2023-10-11 DIAGNOSIS — G62.9 PERIPHERAL POLYNEUROPATHY: ICD-10-CM

## 2023-10-11 DIAGNOSIS — F10.11 ALCOHOL ABUSE, IN REMISSION: ICD-10-CM

## 2023-10-11 DIAGNOSIS — F90.9 ATTENTION DEFICIT HYPERACTIVITY DISORDER (ADHD), UNSPECIFIED ADHD TYPE: Primary | ICD-10-CM

## 2023-10-11 PROCEDURE — 90686 IIV4 VACC NO PRSV 0.5 ML IM: CPT | Performed by: FAMILY MEDICINE

## 2023-10-11 PROCEDURE — 99214 OFFICE O/P EST MOD 30 MIN: CPT | Mod: 25 | Performed by: FAMILY MEDICINE

## 2023-10-11 PROCEDURE — 90471 IMMUNIZATION ADMIN: CPT | Performed by: FAMILY MEDICINE

## 2023-10-11 PROCEDURE — 96127 BRIEF EMOTIONAL/BEHAV ASSMT: CPT | Performed by: FAMILY MEDICINE

## 2023-10-11 PROCEDURE — 90480 ADMN SARSCOV2 VAC 1/ONLY CMP: CPT | Performed by: FAMILY MEDICINE

## 2023-10-11 PROCEDURE — 91320 SARSCV2 VAC 30MCG TRS-SUC IM: CPT | Performed by: FAMILY MEDICINE

## 2023-10-11 RX ORDER — DEXTROAMPHETAMINE SACCHARATE, AMPHETAMINE ASPARTATE, DEXTROAMPHETAMINE SULFATE AND AMPHETAMINE SULFATE 5; 5; 5; 5 MG/1; MG/1; MG/1; MG/1
TABLET ORAL
Qty: 60 TABLET | Refills: 0 | Status: SHIPPED | OUTPATIENT
Start: 2023-12-07 | End: 2023-11-21

## 2023-10-11 RX ORDER — DEXTROAMPHETAMINE SACCHARATE, AMPHETAMINE ASPARTATE, DEXTROAMPHETAMINE SULFATE AND AMPHETAMINE SULFATE 5; 5; 5; 5 MG/1; MG/1; MG/1; MG/1
TABLET ORAL
Qty: 60 TABLET | Refills: 0 | Status: SHIPPED | OUTPATIENT
Start: 2023-10-11 | End: 2024-01-30

## 2023-10-11 RX ORDER — DEXTROAMPHETAMINE SACCHARATE, AMPHETAMINE ASPARTATE, DEXTROAMPHETAMINE SULFATE AND AMPHETAMINE SULFATE 5; 5; 5; 5 MG/1; MG/1; MG/1; MG/1
TABLET ORAL
Qty: 60 TABLET | Refills: 0 | Status: SHIPPED | OUTPATIENT
Start: 2023-11-09 | End: 2023-11-21

## 2023-10-11 RX ORDER — DEXTROAMPHETAMINE SACCHARATE, AMPHETAMINE ASPARTATE MONOHYDRATE, DEXTROAMPHETAMINE SULFATE AND AMPHETAMINE SULFATE 7.5; 7.5; 7.5; 7.5 MG/1; MG/1; MG/1; MG/1
30 CAPSULE, EXTENDED RELEASE ORAL DAILY
Qty: 30 CAPSULE | Refills: 0 | Status: SHIPPED | OUTPATIENT
Start: 2023-12-01 | End: 2023-11-21

## 2023-10-11 RX ORDER — DEXTROAMPHETAMINE SACCHARATE, AMPHETAMINE ASPARTATE MONOHYDRATE, DEXTROAMPHETAMINE SULFATE AND AMPHETAMINE SULFATE 7.5; 7.5; 7.5; 7.5 MG/1; MG/1; MG/1; MG/1
30 CAPSULE, EXTENDED RELEASE ORAL DAILY
Qty: 30 CAPSULE | Refills: 0 | Status: SHIPPED | OUTPATIENT
Start: 2023-11-02 | End: 2023-11-21

## 2023-10-11 RX ORDER — TIZANIDINE 2 MG/1
2 TABLET ORAL 3 TIMES DAILY PRN
Qty: 90 TABLET | Refills: 11 | Status: SHIPPED | OUTPATIENT
Start: 2023-10-11 | End: 2024-05-29 | Stop reason: ALTCHOICE

## 2023-10-11 RX ORDER — DEXTROAMPHETAMINE SACCHARATE, AMPHETAMINE ASPARTATE MONOHYDRATE, DEXTROAMPHETAMINE SULFATE AND AMPHETAMINE SULFATE 7.5; 7.5; 7.5; 7.5 MG/1; MG/1; MG/1; MG/1
30 CAPSULE, EXTENDED RELEASE ORAL DAILY
Qty: 30 CAPSULE | Refills: 0 | Status: SHIPPED | OUTPATIENT
Start: 2025-01-02 | End: 2024-03-26 | Stop reason: ALTCHOICE

## 2023-10-11 RX ORDER — MIDODRINE HYDROCHLORIDE 10 MG/1
TABLET ORAL
Qty: 30 TABLET | Refills: 0 | Status: SHIPPED | OUTPATIENT
Start: 2023-10-11

## 2023-10-11 ASSESSMENT — PAIN SCALES - GENERAL: PAINLEVEL: NO PAIN (0)

## 2023-10-11 NOTE — PROGRESS NOTES
Assessment & Plan     Attention deficit hyperactivity disorder (ADHD), unspecified ADHD type  Continue the need for current medication  Report his pharmacy has not had short acting Adderall for a few months now.    He is scheduled to see psychiatry on October 18, 2023    - amphetamine-dextroamphetamine (ADDERALL XR) 30 MG 24 hr capsule; Take 1 capsule (30 mg) by mouth daily for 30 days  - amphetamine-dextroamphetamine (ADDERALL XR) 30 MG 24 hr capsule; Take 1 capsule (30 mg) by mouth daily for 30 days  - amphetamine-dextroamphetamine (ADDERALL XR) 30 MG 24 hr capsule; Take 1 capsule (30 mg) by mouth daily for 30 days  - amphetamine-dextroamphetamine (ADDERALL) 20 MG tablet; 2 tab at noon  - amphetamine-dextroamphetamine (ADDERALL) 20 MG tablet; 2 tab at noon  - amphetamine-dextroamphetamine (ADDERALL) 20 MG tablet; 2 tab tab at noon    Encounter for screening involving social determinants of health (SDoH)  Follow up with .  - Primary Care - Care Coordination Referral; Future    Anemia in chronic kidney disease, on chronic dialysis (H)  Stable,   Patient continues to be on hemodialysis 3 times per week.  He reports he is getting his annual evaluation for  consideration of kidney transplant.    In addition, discussed with patient to follow/ establish with a dentist, recommended to contact [place] [facility] [employer].    Alcohol abuse, in remission  In full remission.    Cirrhosis of liver without ascites, unspecified hepatic cirrhosis type (H)  Stable, HD, 3x a week.    DIMITRI (generalized anxiety disorder)  Stable, follow up with psychiatry on 10/18/2023    Peripheral polyneuropathy    - tiZANidine (ZANAFLEX) 2 MG tablet; Take 1 tablet (2 mg) by mouth 3 times daily as needed for muscle spasms (as needed)    Chronic pain syndrome    - tiZANidine (ZANAFLEX) 2 MG tablet; Take 1 tablet (2 mg) by mouth 3 times daily as needed for muscle spasms (as needed)    Orthostatic hypotension  Use as needed before hemodialysis.  -  "midodrine (PROAMATINE) 10 MG tablet; One tab before Dialysis    BMI:   Estimated body mass index is 28.08 kg/m  as calculated from the following:    Height as of this encounter: 1.676 m (5' 6\").    Weight as of this encounter: 78.9 kg (174 lb).   Weight management plan: Discussed healthy diet and exercise guidelines    Depression Screening Follow Up        10/10/2023     2:42 PM   PHQ   PHQ-9 Total Score 11   Q9: Thoughts of better off dead/self-harm past 2 weeks Not at all         10/10/2023     2:42 PM   Last PHQ-9   1.  Little interest or pleasure in doing things 1   2.  Feeling down, depressed, or hopeless 1   3.  Trouble falling or staying asleep, or sleeping too much 2   4.  Feeling tired or having little energy 2   5.  Poor appetite or overeating 1   6.  Feeling bad about yourself 0   7.  Trouble concentrating 3   8.  Moving slowly or restless 1   Q9: Thoughts of better off dead/self-harm past 2 weeks 0   PHQ-9 Total Score 11       Follow Up Actions Taken  Patient counseled, no additional follow up at this time.  Depression Action Plan reviewed with patient.  Follow up recommended: has an appt with psychiatry on 10/18/2023      Work on weight loss  Regular exercise    Kole Gomez MD  [facility]    David Verduzco is a 32 year old, presenting for the following health issues:  General follow up and Chronic Disease Management        10/11/2023     9:50 AM   Additional Questions   Roomed by Yomaira DIA CMA   Accompanied by Self         10/11/2023     9:50 AM   Patient Reported Additional Medications   Patient reports taking the following new medications None       History of Present Illness       CKD: He is not using over the counter pain medicine.     He eats 0-1 servings of fruits and vegetables daily.He consumes 3 sweetened beverage(s) daily.He exercises with enough effort to increase his heart rate 30 to 60 minutes per day.  He exercises with enough effort to increase his " "heart rate 7 days per week.   He is taking medications regularly.       Review of Systems   Constitutional, HEENT, cardiovascular, pulmonary, GI, , musculoskeletal, neuro, skin, endocrine and psych systems are negative, except as otherwise noted.      Objective    /76 (BP Location: Left arm, Patient Position: Chair, Cuff Size: Adult Regular)   Pulse 108   Temp 98.1  F (36.7  C) (Oral)   Resp 28   Ht 1.676 m (5' 6\")   Wt 78.9 kg (174 lb)   SpO2 99%   BMI 28.08 kg/m    Body mass index is 28.08 kg/m .  Physical Exam   GENERAL: healthy, alert and no distress  RESP: lungs clear to auscultation - no rales, rhonchi or wheezes  CV: regular rate and rhythm, normal S1 S2, no S3 or S4, no murmur, click or rub, no peripheral edema and peripheral pulses strong  ABDOMEN: soft, nontender, no hepatosplenomegaly, no masses and bowel sounds normal  MS: chronic feet, pes cavus.  SKIN: no suspicious lesions or rashes  NEURO: Normal strength and tone, mentation intact and speech normal  PSYCH: mentation appears normal, affect normal/bright    Orders Placed This Encounter   Procedures    REVIEW OF HEALTH MAINTENANCE PROTOCOL ORDERS    DEPRESSION ACTION PLAN (DAP)    COVID-19 12+ (2023-24) (PFIZER)    INFLUENZA VACCINE IM > 6 MONTHS VALENT IIV4 (AFLURIA/FLUZONE)    Primary Care - Care Coordination Referral          Kole Gomez MD              "

## 2023-10-16 ENCOUNTER — TELEPHONE (OUTPATIENT)
Dept: TRANSPLANT | Facility: CLINIC | Age: 33
End: 2023-10-16
Payer: MEDICAID

## 2023-10-16 NOTE — TELEPHONE ENCOUNTER
VM message left reminding patient of upcoming PKE appointments at [facility] on 10/19/23 starting at 0730. Instructed patient may eat breakfast and take regularly scheduled medications.  Contact information given for any further questions/concerns/need to reschedule.

## 2023-10-17 NOTE — PROGRESS NOTES
"[facility]  Outpatient MNT: Kidney Transplant Evaluation    Current BMI: 28 (HT 67 in,  lbs/81 kg)  BMI guideline for kidney transplant up to a BMI of 40 / per surgeon discretion     Frailty Assessment -- Not Frail (2/5 points)- reported exhaustion, low activity level    *Scored Frail (4/5 points) 6/2022    Recommended Interventions to Address Frailty:  Continue with planned PT     Time Spent: 30 minutes  Visit Type: follow up (saw pt for liver kidney eval 6/2022)  Referring Physician: Chandler   Pt accompanied by: his sister     History of previous txp: none   Dialysis: yes    Dialysis Info: 6/2021 approximately, currently goes @ 1 pm  Protein supplement: none     Nutrition Assessment  Pt was declined for a liver txp last year. He has a h/o necrotizing pancreatitis, ROSA (prescribed Creon, unsure if he is taking this), h/o etoh cirrhosis. It was rather difficult to keep patient on track today.     When I saw pt last year he was living in a transitional care unit, but he has now been on his own for ~14 months.   He recently started receiving [employer] meal delivery 2 weeks ago.     Vitamins, Supplements, Pertinent Meds: none   Herbal Medicines/Supplements: none     Edema: none     Weight hx: has regained some muscle, but also fat mass (up ~30 lbs since I saw pt last summer)  Wt Readings from Last 10 Encounters:   10/19/23 81.2 kg (179 lb 1.6 oz)   10/11/23 78.9 kg (174 lb)   08/25/23 79 kg (174 lb 2.6 oz)   05/22/23 75 kg (165 lb 5.5 oz)   04/17/23 80.7 kg (178 lb)   01/23/23 77.6 kg (171 lb)   10/06/22 67.5 kg (148 lb 13 oz)   08/31/22 65.3 kg (144 lb)   07/13/22 75.8 kg (167 lb)   07/12/22 66.7 kg (147 lb)     Diet Recall  Breakfast Eats BF 60% of the time- beef jerky; leftover chicken & rice, szechuan beans, potato (from [employer])   Lunch HD days- [employer] (Italian BMT or meatball 12\", but saves part for later) or brings leftovers, spaghetti    Dinner [employer] nelson x 1 + 2 " "pieces of cheesy bread; chicken, potato, beans ([employer]); makes steak/pork, veggies, etc from scratch at home    Snacks [employer] bars, popcorn, BF burritos (homemade w/ potatoes, onions, vyas, mushrooms, eggs), strawberries   Beverages Milk (2-3 cups, but not daily), juice (apple, white grape- a few cups/week), pop (3 cans Mountain Dew/day)   Alcohol None    Dining out Difficult to clarify- pt's diet recall mostly consists of food outside the home but later he reports only 5% of food intake is from outside the home     Physical Activity  Pt reports he is supposed to have a PCA but is in between services now  Does ADLs ok- gets tired out sometimes but not always   Has a walker & power wheelchair at least since our last visit (uses w/c mostly- d/t bilateral neuropathy, balance issues, deconditioning, etc)  He reports his winter exercise is \"putting on a lot of warm clothes\", which takes 30 minutes  Can walk >500 ft w/ walker  Last time he did PT - spring time   ?plan to start water aerobics with [employer]  10/9 Phos 2.6 K 3.1     Nutrition Diagnosis  No nutrition diagnosis identified at this time     Nutrition Intervention  Nutrition education provided:  Discussed sodium intake (low sodium foods and drinks, seasoning food without salt and tips for low sodium diet).  Reviewed wnl / low K/Phos levels. Discussed protein needs w/ dialysis. Reviewed functional status improvements and continuing to work with PT, etc.     Reviewed post txp diet guidelines in brief (will review in further detail post txp):  (1) Review of proper food safety measures d/t immunosuppressant therapy post-op and increased risk for food-borne illness    (2) Avoid the following post txp d/t risk for rejection, unknown effects on the organs, and/or potential interactions with immunosuppressants:  - Herbal, Chinese, holistic, chiropractic, natural, alternative medicines and supplements  - Detoxes and cleanses  - Weight loss pills  - " Protein powders or other products with extracts or herbs (ie green tea extract)    (3) Med regimen and possible side effects    Patient Understanding: Pt verbalized understanding of education provided.  Expected Engagement: Good  Follow-Up Plans: PRN     Nutrition Goals  No nutrition goals identified at this time     Luisa Choudhary, RD, LD, CCTD

## 2023-10-18 ENCOUNTER — VIRTUAL VISIT (OUTPATIENT)
Dept: PSYCHIATRY | Facility: CLINIC | Age: 33
End: 2023-10-18
Attending: FAMILY MEDICINE
Payer: COMMERCIAL

## 2023-10-18 DIAGNOSIS — F41.1 GAD (GENERALIZED ANXIETY DISORDER): ICD-10-CM

## 2023-10-18 LAB
ABO/RH(D): NORMAL
ANTIBODY SCREEN: NEGATIVE
SPECIMEN EXPIRATION DATE: NORMAL

## 2023-10-18 PROCEDURE — 99205 OFFICE O/P NEW HI 60 MIN: CPT | Mod: 95 | Performed by: NURSE PRACTITIONER

## 2023-10-18 ASSESSMENT — PAIN SCALES - GENERAL: PAINLEVEL: MODERATE PAIN (4)

## 2023-10-18 NOTE — NURSING NOTE
Is the patient currently in the state of [place]? YES    Visit mode:VIDEO    If the visit is dropped, the patient can be reconnected by: VIDEO VISIT: Send to e-mail at: [email or web address]    Will anyone else be joining the visit? NO  (If patient encounters technical issues they should call [phone]    How would you like to obtain your AVS? MyChart    Are changes needed to the allergy or medication list? No    Reason for visit: Consult    Valentine MULLEN

## 2023-10-18 NOTE — PROGRESS NOTES
"  The patient has been notified of following:      \"This virtual  visit will be conducted via a call between you and your physician/provider. We have found that certain health care needs can be provided without the need for a physical exam.  This service lets us provide the care you need virtually/via video   If a prescription is necessary we can send it directly to your pharmacy.  If lab work is needed we can place an order for that and you can then stop by our lab to have the test done at a later time.      Virtual/Video visits are billed at different rates depending on your insurance coverage.Some insurers they may be billed the same as an in-person visit.  Please reach out to your insurance provider with any questions.    Patient has given verbal consent for virtual visit? Yes    How would you like to obtain your AVS? Mail a copy  If the video visit is dropped, the invitation should be resent by: Send to e-mail at: [email or web address]  Will anyone else be joining your video visit? No              Date of Service:  10/18/2023    Name:  Dax Villareal  :  1990  MRN:  [id number]    HPI:   Dax Villareal is a 32 year old male with  a past medical history of end-stage renal disease, on hemodialysis 3 times per week.  History of generalized anxiety disorder, history of chronic pain.     Started dialysis in         Per patient statement today: Currently struggling with anxiety and depression symptoms however he is not interested in any psychotropics interventions and is requesting for Xanax and Adderall.  He has been on Xanax before and is currently on Adderall.  Primary care is prescribing Adderall.  We did discuss that stimulants can worsen symptoms of anxiety.  We also discussed that his current symptoms of depression and anxiety and he may benefit from antidepressants and antianxiety medications.   I did share my recommendation cutting down the weaning off from stimulants and focusing on treating " depression and anxiety symptoms with noncontrolled medications.  Patient would like to continue taking his stimulants.  PCP is prescribing stimulants  Patient vocalized  that he was not interested in taking any antidepressants or any other psychotropics apart from Xanax.  I did recommend psychotherapy to which he was amenable.  Ambulatory referral to psychotherapy was made.  Given that he is not interested in medication management patient will return to primary care provider who has been managing his stimulant medications.  He is welcome to return to my clinic should he change his mind and would be interested in treating his depression and anxiety.  He endorsed understanding          Past psychiatric medications include  Hydroxyzine  Xanax-    Duloxetine     Current psychiatric medications include  mphetamine-dextroamphetamine (ADDERALL XR) 30 MG 24 hr capsule; Take 1 capsule (30 mg) by mouth daily - Taken since 6/7 th grade- PCP has been prescribing     Current psychiatric symptoms include  Depression-hopelessness, isolation , sadness,   Suicidal homicidal ideations-Denies   Anxiety-Heart  racing , excessive worry , restlessness, racing  thoughts       Triggers : medical condition. Financial constraints affecting his ability to live a a normal and enjoyable life.           Psychiatric History:  Current psychiatrist: None .  Current psychotherapist: None .  Primary Care Provider   History of Psychiatric Hospitalizations:   - Inpatient: None  - IOP/PHP/Day treatment:   Suicide attempts: Denies .  Electroconvulsive therapy: Denies .  Judicial commitments: Denies .  Anxiety : Endorses anxiety    Social anxiety: Denies   OCD: Denies   Depression: Endorses   Diana/hypomania:   PTSD: Trauma from death and suicide for close family ,Trauma from chronic  pain ad illness    Hallucinations : Denies   Eating disorder:Denies   ADHD: History of ADHD on stimulants   Personality disorder including history of oppositional defiant  disorder or conduct disorder in childhood:   Hx of autism spectrum disorder, learning disability, and or other cognitive disorder  History of seizures-Denies       SAFETY   Feels safe in home: Yes   Suicidal ideation: Denies  History of suicide attempts:  No   Hx of impulsivity: No Impetuous and self-damaging behavior is common and can take many forms. Patients abuse substances, binge eat, engage in unsafe sex, spend money irresponsibly, and drive recklessly. In addition, patients can suddenly quit a job that they need or end a relationship that has the potential to last, thereby sabotaging their own success. Impulsivity can also manifest with immature and regressive behavior and often takes the form of sexually acting out.  Hope for the future: present   Hx of Command hallucinations or current psychosis: No  History of Self-injurious behaviors: No Current:  No  Family member  by suicide:  No     SAFETY ASSESSMENT:   Based on all available evidence including the factors cited above, overall Risk for harm is low and is appropriate for outpatient level of care.   Recommended that patient call 911 or go to the local ED should there be a change in any of these risk factors.     Suicide Risk Factors: diagnosis of a mental disorder (especially depression or mood disorders)  Risk is mitigated by the following protective factors: access to behavioral health care, active involvement in treatment, health seeking behaviors, family, stable housing, no access to weapons and no current SI        Decision Making Capacity   Patient has the capacity to make independent decisions regarding medical and psychiatric care.    Chemical use History:                      Drug/treatment history and current pattern of use:   Drug of choice: Alcohol   Last alcohol use :   History of alcohol abuse and history of cirrhosis and pancreatis and kidney failure   Nicotine -1/2 pack a day   Cannabis- Denies   Denies use of all other mood  altering substances      Past Medical History:       Past Medical History:   Diagnosis Date    ADHD 2008    Alcohol abuse, in remission     Alcoholic cirrhosis of liver with ascites (H)     Anemia in chronic kidney disease     Chronic pain     Current smoker     End stage renal disease (H)     Hypotension, unspecified hypotension type     Malnutrition (H24)     Metabolic acidosis     Necrotizing pancreatitis     Pancreatic insufficiency     Pericarditis     Recurrent pleural effusion        Past Surgical History:   Procedure Laterality Date    AV FISTULA PLACEMENT, BRACHIOCEPHALIC Left 05/11/2022    [facility]    COMBINED ESOPHAGOSCOPY, GASTROSCOPY, DUODENOSCOPY (EGD), TISSUE APPOSI N/A 09/20/2021    Procedure: ESOPHAGOGASTRODUODENOSCOPY WITH SUTURE FISTULA CLOSURE, argon plasma coagulation;  Surgeon: Jose Quigley MD;  Location: [facility]    ENDOSCOPIC INSERTION TUBE GASTROSTOMY N/A 07/13/2021    Procedure: Upper endoscopy, INSERTION, PEG-J TUBE and Gastropexy;  Surgeon: Rayshawn Will MD;  [facility]: [facility]    ENDOSCOPIC INSERTION TUBE GASTROSTOMY  09/13/2021    Procedure: PEG/J PLACEMENT.;  Surgeon: Rayshawn Will MD;  [facility]: [facility]    ENDOSCOPIC ULTRASOUND UPPER GASTROINTESTINAL TRACT (GI) N/A 07/21/2021    Procedure: ENDOSCOPIC ULTRASOUND, ESOPHAGOSCOPY / UPPER GASTROINTESTINAL TRACT (GI) with cyst gastrostomy, dilation;  Surgeon: Guru Yecenia Chacko MD;  [facility]    ENDOSCOPIC ULTRASOUND, ESOPHAGOSCOPY, GASTROSCOPY, DUODENOSCOPY (EGD), NECROSECTOMY N/A 08/11/2021    Procedure: ESOPHAGOGASTRODUODENOSCOPY (EGD) with necrosectomy, stent exchange.;  Surgeon: Amadou Beasley MD;  [facility]    ESOPHAGOSCOPY, GASTROSCOPY, DUODENOSCOPY (EGD), COMBINED N/A 07/28/2021    Procedure: ESOPHAGOGASTRODUODENOSCOPY (EGD), gastrostomy apposition, Necrosectomy, stent exchange.;  Surgeon: Rayshawn Will MD;  [facility]    ESOPHAGOSCOPY, GASTROSCOPY, DUODENOSCOPY  "(EGD), COMBINED N/A 08/06/2021    Procedure: ESOPHAGOGASTRODUODENOSCOPY (EGD) with necrosectomy, and stents exchanged;  Surgeon: Jose Quigley MD;  [facility]    ESOPHAGOSCOPY, GASTROSCOPY, DUODENOSCOPY (EGD), COMBINED Left 09/13/2021    Procedure: ESOPHAGOGASTRODUODENOSCOPY (EGD), GASTROSTOMY TUBE REMOVAL, FISTULAR CLOSURE ENDOSCOPIC.;  Surgeon: Rayshawn Will MD;  [facility]    IR ABSCESS TUBE CHANGE  07/14/2021    IR NG TUBE PLACEMENT REQ RAD & FLUORO  10/26/2021    IR PARACENTESIS  07/21/2021    IR SINOGRAM INJECTION DIAGNOSTIC  08/25/2021    IR THORACENTESIS  02/05/2022    ORTHOPEDIC SURGERY Right     fracture repair    REPLACE GASTROSTOMY TUBE, PERCUTANEOUS N/A 07/28/2021    Procedure: gastro-jejunostomy tube exchange;  Surgeon: Rayshawn Will MD;  Location: [facility]        Family Psychiatric History:      Mental illness: Mum - bipolar , anxiety .Dad- depression , brother - depression -  Addiction: sister - alcohol abuse   Suicide: Brother - 2012.      Social History:       Marital Status : Single  Sexual Orientation : Heterosexual   Number of children: None .  Current living circumstances: Lives alone .  Current sources of financial support: SSD.Currently unemployed     Obstetric History:  Last menstrual period: N/A.  Pregnancy history: N/A.     History:  Denied  service.    Access to weapons  Owns guns but they stay at a friends house - enjoys hunting           Trauma & Abuse History:  Major accidents and injuries: Denies   Concussion or traumatic brain injury: Denies .  Abuse: Denies .    Spiritual History:  Sources of hope, meaning, comfort, strength, peace and love: \" Sister, brother , 2 friends \" .  Part of an organized Roman Catholic: Rastafarian .    Birth & Development History:  City and state of birth: [place]..  Highest education achieved:High school diploma and some college credits     Legal History:  DWI : 2 incident over 5 years ago   Number of arrests: Denies .  Longest " period of incarceration: Denies .  Probation/parole status: Den ies .      [place] Prescription Monitoring Program:  No worrisome pharmacy activity.  Not indicated for this patient.    Medications:   These were reviewed.  Current Outpatient Medications   Medication    ALPRAZolam (XANAX) 0.5 MG tablet    [START ON 11/2/2023] amphetamine-dextroamphetamine (ADDERALL XR) 30 MG 24 hr capsule    [START ON 12/1/2023] amphetamine-dextroamphetamine (ADDERALL XR) 30 MG 24 hr capsule    [START ON 1/2/2025] amphetamine-dextroamphetamine (ADDERALL XR) 30 MG 24 hr capsule    amphetamine-dextroamphetamine (ADDERALL XR) 30 MG 24 hr capsule    amphetamine-dextroamphetamine (ADDERALL) 20 MG tablet    [START ON 11/9/2023] amphetamine-dextroamphetamine (ADDERALL) 20 MG tablet    [START ON 12/7/2023] amphetamine-dextroamphetamine (ADDERALL) 20 MG tablet    amphetamine-dextroamphetamine (ADDERALL) 20 MG tablet    amylase-lipase-protease (CREON 24) 98233-89150 units CPEP per EC capsule    DULoxetine (CYMBALTA) 60 MG capsule    lidocaine (XYLOCAINE) 5 % external ointment    midodrine (PROAMATINE) 10 MG tablet    midodrine (PROAMATINE) 5 MG tablet    pantoprazole (PROTONIX) 40 MG EC tablet    pregabalin (LYRICA) 300 MG capsule    prochlorperazine (COMPAZINE) 5 MG tablet    sevelamer carbonate, RENVELA, 0.8 GM PACK Packet    silver nitrate (ARZOL) 75-25 % miscellaneous    tiZANidine (ZANAFLEX) 2 MG tablet    torsemide (DEMADEX) 20 MG tablet    vitamin (B COMPLEX) capsule    acetaminophen (TYLENOL) 325 MG/10.15ML solution    Cetaphil Moisturizing (CETAPHIL) external lotion    diclofenac (VOLTAREN) 1 % topical gel    folic acid (FOLVITE) 1 MG tablet    Guar Gum (FIBER MODULAR, NUTRISOURCE FIBER,) packet    lactulose (CHRONULAC) 10 GM/15ML solution    naloxone (NARCAN) 4 MG/0.1ML nasal spray    nortriptyline (PAMELOR) 25 MG capsule    potassium chloride (KLOR-CON) 20 MEQ packet    rOPINIRole (REQUIP) 0.25 MG tablet    sodium bicarbonate 325  "MG tablet    triamcinolone (KENALOG) 0.1 % external cream     No current facility-administered medications for this visit.               Medication adherence: Reviewed risk/benefits of medication , Patient able to verbalize understanding of side effects and Patient verbally consents to taking medications      Lab Results:   Personally reviewed and discussed with the patient    Lab Results   Component Value Date    WBC 4.8 05/22/2023    HGB 13.6 05/22/2023    HCT 40 05/22/2023     (L) 05/22/2023    CHOL 162 06/28/2022    TRIG 70 06/28/2022    HDL 62 06/28/2022    ALT 31 05/22/2023    AST 30 05/22/2023     05/22/2023    BUN 24.3 (H) 05/22/2023    CO2 21 (L) 05/22/2023    TSH 1.59 06/28/2022    PSA 0.49 06/28/2022    INR 1.14 06/28/2022     No results found for: \"PHENYTOIN\", \"PHENOBARB\", \"VALPROATE\", \"CBMZ\"          Vital signs:  There were no vitals taken for this visit.    Allergies:   Patient has no known allergies.        Associated Clinical Documents:       Notes reviewed in EPIC and [facility] including: medication reconciliation, progress notes, recent labs, PMH, and OSH records.    ROS:       10 point ROS was negative except for the items listed in HPI.  No Medication s/e's              MSE:        Appearance: Well groomed, good eye contact, appears as stated age   Orientation: Patient alert and oriented to person, place, time, and situation  Reliability:  Patient appears to be an adequate historian.    Behavior: Cooperative   Speech: Speech is spontaneous and coherent, with a normal rate, rhythm and tone.    Language:There are no difficulties with expressive or receptive language as observed throughout the interview.    Mood: Described as \"ok\".    Affect: Congruent   Judgement: Able to make basic decision regarding safety.  Insight: Good awareness of physical and mental health conditions and aware of needs around care for these.  Gait and station: unable to assess  Thought process: Logical   Thought " content: No evidence of delusions or paranoia.    Hallucinations : No evidence of any hallucination  Thought content: No evidence of delusions or paranoia.   Suicidal /Homical Ideations:  No thoughts of self harm or suicide. No thoughts of harming others.  Associations: Connected  Fund of knowledge: Average  Attention / Concentration: Able to remain focused during the interview with minimal distractibility or need for redirection.  Short Term Memory: Grossly intact as evidence by client recalling themes and ideas discussed.  Long Term Memory: Intact  Motor Status: unable to asse              PSYCHOEDUCATION:  Medication side effects and alternatives reviewed. Health promotion activities recommended and reviewed today. All questions addressed. Education and counseling completed regarding risks and benefits of medications and psychotherapy options.  Consent provided by patient/guardian  Call the psychiatric nurse line with medication questions or concerns at [phone].  [employer] may be used to communicate with your provider, but this is not intended to be used for emergencies.  SEROTONIN SYNDROME:  Discussed risks of Serotonin syndrome (ie, serotonin toxicity) which is a potentially life-threatening condition associated with increased serotonergic activity in the central nervous system (CNS). It is seen with therapeutic medication use, inadvertent interactions between drugs, and intentional self-poisoning. Serotonin syndrome may involve a spectrum of clinical findings, which often include mental status changes, autonomic hyperactivity, and neuromuscular abnormalities.    STIMULANT THERAPY: Side effects discussed including but not limited to cardiac (including HTN, tachycardia, sudden death), motor/tic, appetite/growth, mood lability and sleep disruption. This is a controlled substance with risk for abuse, need to keep in a safe keep place and cannot replace lost scripts  HARM REDUCTION:  Discussions regarding  effects of mood altering substances, alcohol and cannabis, on mood and that approach is harm reduction, will continue to prescribe meds as they work to cut back use.    SAFETY:  We all care about your loved one's safety. To reduce the risk of self-harm, remove access to all:  Firearms, Medicines (both prescribed and over-the-counter), Knives and other sharp objects, Ropes and like materials, and Alcohol  SLEEP HYGIENE: establish a sleep routine, limit screen time 1 hour prior to bed, use bed for sleep only, take sleep/medications on time (including sleepy time tea, trazadone or herbal treatments such as melatonin), aroma therapy, limit caffeine/sugar, yoga, guided imagery, stretch, meditation, limit naps to 20 minutes, make a temperature change in the room, white noise, be mindful of slowing down breathing, take a warm bath/shower, frequently wash sheets, and journaling.   Medlineplus.gov is information for patients.  It is run by the [employer] and it contains information about all disorders, diseases and all medications.       Working diagnosis :           Generalized anxiety disorder  Major depressive disorder untreated  History of ADHD    Plan:         Patient and I reviewed diagnosis and treatment plan.   Reviewed risks/benefits of medication with patient.  Ongoing education given regarding diagnostic and treatment options with adequate verbalization of understanding  Patient agrees with following recommendations:    1.Depression : Ambulatory referral to psychology- individual psychotherapy    2.  Patient is not currently interested in medication management and for depression and anxiety at this time.  He will return to his primary care for continued management of his ADHD medication              Crisis Resources:    Present to the Emergency Department as needed or call after hours crisis line at [phone] or [phone].   [place] Crisis Text [place]: Text [place] to 209865.  Suicide LifeLine  Chat: suicidepreventionlifeline.org/chat/.  National Suicide Prevention Lifeline: 432.877.9545 (TTY: 725.302.5843). Call anytime for help.  (www.suicidepreventionlifeline.org)  National [place] on Mental Illness (www.baljinder.org): 103.817.4565 or 843-259-6289.  Mental Health Association (www.mentalhealth.org): 839.897.7715 or 180-903-4665.    Video-Visit Details    Type of service:  Video Visit    Originating Location (pt. Location): Home    Distant Location (provider location):  Providers Remote Office     Platform used for Video Visit: La        Total Time:      60 Minutes spent on this visit with >50% time spent on  dscussing and educating patient about diagnosis, treatment options, risks, benefits ,side effects of medications and instructions for follow up.  Time also spent on reviewing  Past  EHR from the providers  For better transition of care    This dictation was completed with speech recognition software and there may be unintended word substitutions.  Start Time : 1000  End Time : 1077

## 2023-10-18 NOTE — PATIENT INSTRUCTIONS
"The [facility]  What to Expect  Here's what to expect in the Panel Psychiatry Program.   About the program  You'll be meeting with a psychiatric doctor to check your mental health. A psychiatric doctor helps you deal with troubling thoughts and feelings by giving you medicine. They'll make sure you know the plan for your care. You may see them for a long time. When you're feeling better, they may refer you back to seeing your family doctor.   If you have any questions, we'll be glad to talk to you.  About visits  Be open  At your visits, please talk openly about your problems. It may feel hard, but it's the best way for us to help you.  Cancelling visits  If you can't come to your visit, please call us right away at [phone]. If you don't cancel at least 24 hours (1 full day) before your visit, that's \"late cancellation.\"  Not showing up for your visits  Being very late is the same as not showing up. You'll be a \"no show\" if:  You're more than 15 minutes late for a 30-minute (half hour) visit.  You're more than 30 minutes late for a 60-minute (full hour) visit.  If you cancel late or don't show up 2 times within 6 months, we may end your care.  Getting help between visits  If you need help between visits, you can call us Monday to Friday from 8 a.m. to 4:30 p.m. at [phone].  Emergency care  Call 911 or go to the nearest emergency department if your life or someone else's life is in danger.  Call 988 anytime to reach the national Suicide and Crisis hotline.  Medicine refills  To refill your medicine, call your pharmacy. You can also call [facility]'s Behavioral Access at [phone], Monday to Friday, 8 a.m. to 4:30 p.m. It can take 1 to 3 business days to get a refill.   Forms, letters, and tests  You may have papers to fill out, like FMLA, short-term disability, and workability. We can help you with these forms at your visits, but you must have an appointment. You may need more " than 1 visit for this, to be in an intensive therapy program, or both.  Before we can give you medicine for ADHD, we may refer you to get tested for it or confirm it another way.  We may not be able to give you an emotional support animal letter.  We don't do mental health checks ordered by the court.   We don't do mental health testing, but we can refer you to get tested.   Thank you for choosing us for your care.  For informational purposes only. Not to replace the advice of your health care provider. Copyright   2022 [facility]. All rights reserved. Benefitter [id number] - 12/22      After Visit Summary   Continue medications as prescribed  Have your pharmacy contact us for a refill if you are running low on medications (We may ask you to come into clinic to get a refill from the nurse  No Alcohol or drug use  No driving if sedated  Call the clinic with any questions or concerns   Reach out for help if you feel like hurting yourself or others ([facility] [phone]: [address] [facility] [facility], [address] or [facility] Suicide Hotline   [phone] , call 911 or go to nearest Emergency room     Crisis Resources:    Present to the Emergency Department as needed or call after hours crisis line at [phone] or [phone].   [place] Crisis Text Line: Text [place] to 861671.  Suicide LifeLine Chat: suicidepreventionlifeline.org/chat/.  National Suicide Prevention Lifeline: 672.649.7853 (TTY: 359.616.9215). Call anytime for help.  (www.suicidepreventionlifeline.org)  National [place] on Mental Illness (www.baljinder.org): 954.146.9139 or 116-563-2388.  Mental Health Association (www.mentalhealth.org): 748.935.1299 or 708-689-2500.       Follow up as directed, for your appointments, per your After Visit Summary Form.

## 2023-10-19 ENCOUNTER — ANCILLARY PROCEDURE (OUTPATIENT)
Dept: GENERAL RADIOLOGY | Facility: CLINIC | Age: 33
End: 2023-10-19
Attending: PHYSICIAN ASSISTANT
Payer: COMMERCIAL

## 2023-10-19 ENCOUNTER — LAB (OUTPATIENT)
Dept: LAB | Facility: CLINIC | Age: 33
End: 2023-10-19
Attending: PHYSICIAN ASSISTANT
Payer: COMMERCIAL

## 2023-10-19 ENCOUNTER — ANCILLARY PROCEDURE (OUTPATIENT)
Dept: CARDIOLOGY | Facility: CLINIC | Age: 33
End: 2023-10-19
Attending: PHYSICIAN ASSISTANT
Payer: COMMERCIAL

## 2023-10-19 ENCOUNTER — OFFICE VISIT (OUTPATIENT)
Dept: TRANSPLANT | Facility: CLINIC | Age: 33
End: 2023-10-19
Attending: PHYSICIAN ASSISTANT
Payer: COMMERCIAL

## 2023-10-19 VITALS
SYSTOLIC BLOOD PRESSURE: 125 MMHG | OXYGEN SATURATION: 95 % | HEIGHT: 67 IN | BODY MASS INDEX: 28.11 KG/M2 | HEART RATE: 118 BPM | WEIGHT: 179.1 LBS | DIASTOLIC BLOOD PRESSURE: 77 MMHG

## 2023-10-19 DIAGNOSIS — N18.9 CHRONIC RENAL FAILURE: ICD-10-CM

## 2023-10-19 DIAGNOSIS — Z01.818 PRE-TRANSPLANT EVALUATION FOR KIDNEY TRANSPLANT: ICD-10-CM

## 2023-10-19 DIAGNOSIS — N18.6 END STAGE RENAL DISEASE (H): ICD-10-CM

## 2023-10-19 DIAGNOSIS — Z76.82 ORGAN TRANSPLANT CANDIDATE: ICD-10-CM

## 2023-10-19 DIAGNOSIS — Z87.891 HISTORY OF TOBACCO USE: ICD-10-CM

## 2023-10-19 DIAGNOSIS — F19.10 SUBSTANCE ABUSE (H): ICD-10-CM

## 2023-10-19 PROBLEM — N17.0 ACUTE KIDNEY FAILURE WITH TUBULAR NECROSIS (H): Status: RESOLVED | Noted: 2021-06-30 | Resolved: 2023-10-19

## 2023-10-19 LAB
A1 AB TITR SERPL: 32 {TITER}
A1 AB TITR SERPL: 32 {TITER}
A2 AB TITR SERPL: 16 {TITER}
A2 AB TITR SERPL: 16 {TITER}
ABO/RH(D): NORMAL
ALBUMIN SERPL BCG-MCNC: 5.2 G/DL (ref 3.5–5.2)
ALBUMIN UR-MCNC: 30 MG/DL
ALP SERPL-CCNC: 121 U/L (ref 40–129)
ALT SERPL W P-5'-P-CCNC: 29 U/L (ref 0–70)
ANION GAP SERPL CALCULATED.3IONS-SCNC: 13 MMOL/L (ref 7–15)
ANTIBODY TITER IGM SCREEN: NEGATIVE
APPEARANCE UR: CLEAR
APTT PPP: 34 SECONDS (ref 22–38)
AST SERPL W P-5'-P-CCNC: 26 U/L (ref 0–45)
BASO+EOS+MONOS # BLD AUTO: ABNORMAL 10*3/UL
BASO+EOS+MONOS NFR BLD AUTO: ABNORMAL %
BASOPHILS # BLD AUTO: 0 10E3/UL (ref 0–0.2)
BASOPHILS NFR BLD AUTO: 1 %
BILIRUB SERPL-MCNC: 0.7 MG/DL
BILIRUB UR QL STRIP: NEGATIVE
BUN SERPL-MCNC: 25.1 MG/DL (ref 6–20)
CALCIUM SERPL-MCNC: 10.5 MG/DL (ref 8.6–10)
CHLORIDE SERPL-SCNC: 93 MMOL/L (ref 98–107)
COLOR UR AUTO: YELLOW
CREAT SERPL-MCNC: 3.1 MG/DL (ref 0.67–1.17)
DEPRECATED HCO3 PLAS-SCNC: 32 MMOL/L (ref 22–29)
EGFRCR SERPLBLD CKD-EPI 2021: 26 ML/MIN/1.73M2
EOSINOPHIL # BLD AUTO: 0.2 10E3/UL (ref 0–0.7)
EOSINOPHIL NFR BLD AUTO: 3 %
ERYTHROCYTE [DISTWIDTH] IN BLOOD BY AUTOMATED COUNT: 12.9 % (ref 10–15)
GLUCOSE SERPL-MCNC: 160 MG/DL (ref 70–99)
GLUCOSE UR STRIP-MCNC: NEGATIVE MG/DL
HBV CORE AB SERPL QL IA: NONREACTIVE
HBV SURFACE AB SERPL IA-ACNC: 10.43 M[IU]/ML
HBV SURFACE AB SERPL IA-ACNC: NORMAL M[IU]/ML
HBV SURFACE AG SERPL QL IA: NONREACTIVE
HCT VFR BLD AUTO: 37.5 % (ref 40–53)
HCV AB SERPL QL IA: NONREACTIVE
HGB BLD-MCNC: 13.3 G/DL (ref 13.3–17.7)
HGB UR QL STRIP: NEGATIVE
HIV 1+2 AB+HIV1 P24 AG SERPL QL IA: NONREACTIVE
HYALINE CASTS: 2 /LPF
IMM GRANULOCYTES # BLD: 0 10E3/UL
IMM GRANULOCYTES NFR BLD: 0 %
KETONES UR STRIP-MCNC: NEGATIVE MG/DL
LEUKOCYTE ESTERASE UR QL STRIP: NEGATIVE
LVEF ECHO: NORMAL
LYMPHOCYTES # BLD AUTO: 0.8 10E3/UL (ref 0.8–5.3)
LYMPHOCYTES NFR BLD AUTO: 13 %
MCH RBC QN AUTO: 31.1 PG (ref 26.5–33)
MCHC RBC AUTO-ENTMCNC: 35.5 G/DL (ref 31.5–36.5)
MCV RBC AUTO: 88 FL (ref 78–100)
MONOCYTES # BLD AUTO: 0.3 10E3/UL (ref 0–1.3)
MONOCYTES NFR BLD AUTO: 4 %
MUCOUS THREADS #/AREA URNS LPF: PRESENT /LPF
NEUTROPHILS # BLD AUTO: 5 10E3/UL (ref 1.6–8.3)
NEUTROPHILS NFR BLD AUTO: 79 %
NITRATE UR QL: NEGATIVE
NRBC # BLD AUTO: 0 10E3/UL
NRBC BLD AUTO-RTO: 0 /100
PH UR STRIP: 7.5 [PH] (ref 5–7)
PLATELET # BLD AUTO: 212 10E3/UL (ref 150–450)
POTASSIUM SERPL-SCNC: 3.1 MMOL/L (ref 3.4–5.3)
PROT SERPL-MCNC: 8.9 G/DL (ref 6.4–8.3)
RBC # BLD AUTO: 4.28 10E6/UL (ref 4.4–5.9)
RBC URINE: 1 /HPF
SODIUM SERPL-SCNC: 138 MMOL/L (ref 135–145)
SP GR UR STRIP: 1.02 (ref 1–1.03)
SPECIMEN EXPIRATION DATE: NORMAL
SPECIMEN EXPIRATION DATE: NORMAL
SQUAMOUS EPITHELIAL: <1 /HPF
T PALLIDUM AB SER QL: NONREACTIVE
UROBILINOGEN UR STRIP-MCNC: NORMAL MG/DL
WBC # BLD AUTO: 6.4 10E3/UL (ref 4–11)
WBC URINE: 1 /HPF

## 2023-10-19 PROCEDURE — 85025 COMPLETE CBC W/AUTO DIFF WBC: CPT | Performed by: PATHOLOGY

## 2023-10-19 PROCEDURE — 80053 COMPREHEN METABOLIC PANEL: CPT | Performed by: PATHOLOGY

## 2023-10-19 PROCEDURE — 93000 ELECTROCARDIOGRAM COMPLETE: CPT | Performed by: INTERNAL MEDICINE

## 2023-10-19 PROCEDURE — 86832 HLA CLASS I HIGH DEFIN QUAL: CPT | Performed by: PHYSICIAN ASSISTANT

## 2023-10-19 PROCEDURE — 81001 URINALYSIS AUTO W/SCOPE: CPT | Performed by: PATHOLOGY

## 2023-10-19 PROCEDURE — 86704 HEP B CORE ANTIBODY TOTAL: CPT | Performed by: PHYSICIAN ASSISTANT

## 2023-10-19 PROCEDURE — 85670 THROMBIN TIME PLASMA: CPT | Performed by: PHYSICIAN ASSISTANT

## 2023-10-19 PROCEDURE — G0463 HOSPITAL OUTPT CLINIC VISIT: HCPCS | Performed by: TRANSPLANT SURGERY

## 2023-10-19 PROCEDURE — 86886 COOMBS TEST INDIRECT TITER: CPT | Performed by: PHYSICIAN ASSISTANT

## 2023-10-19 PROCEDURE — 86706 HEP B SURFACE ANTIBODY: CPT | Performed by: PHYSICIAN ASSISTANT

## 2023-10-19 PROCEDURE — 86147 CARDIOLIPIN ANTIBODY EA IG: CPT | Performed by: PHYSICIAN ASSISTANT

## 2023-10-19 PROCEDURE — 87340 HEPATITIS B SURFACE AG IA: CPT | Performed by: PHYSICIAN ASSISTANT

## 2023-10-19 PROCEDURE — 86644 CMV ANTIBODY: CPT | Performed by: PHYSICIAN ASSISTANT

## 2023-10-19 PROCEDURE — 85730 THROMBOPLASTIN TIME PARTIAL: CPT | Performed by: PHYSICIAN ASSISTANT

## 2023-10-19 PROCEDURE — 99214 OFFICE O/P EST MOD 30 MIN: CPT | Performed by: TRANSPLANT SURGERY

## 2023-10-19 PROCEDURE — 86787 VARICELLA-ZOSTER ANTIBODY: CPT | Performed by: PHYSICIAN ASSISTANT

## 2023-10-19 PROCEDURE — 86780 TREPONEMA PALLIDUM: CPT | Performed by: PHYSICIAN ASSISTANT

## 2023-10-19 PROCEDURE — 36415 COLL VENOUS BLD VENIPUNCTURE: CPT | Performed by: PATHOLOGY

## 2023-10-19 PROCEDURE — 86481 TB AG RESPONSE T-CELL SUSP: CPT | Performed by: PHYSICIAN ASSISTANT

## 2023-10-19 PROCEDURE — 85730 THROMBOPLASTIN TIME PARTIAL: CPT | Performed by: PATHOLOGY

## 2023-10-19 PROCEDURE — 86833 HLA CLASS II HIGH DEFIN QUAL: CPT | Performed by: PHYSICIAN ASSISTANT

## 2023-10-19 PROCEDURE — 93306 TTE W/DOPPLER COMPLETE: CPT | Performed by: INTERNAL MEDICINE

## 2023-10-19 PROCEDURE — 86665 EPSTEIN-BARR CAPSID VCA: CPT | Performed by: PHYSICIAN ASSISTANT

## 2023-10-19 PROCEDURE — 86803 HEPATITIS C AB TEST: CPT | Performed by: PHYSICIAN ASSISTANT

## 2023-10-19 PROCEDURE — 81382 HLA II TYPING 1 LOC HR: CPT | Performed by: PHYSICIAN ASSISTANT

## 2023-10-19 PROCEDURE — 86850 RBC ANTIBODY SCREEN: CPT | Performed by: PHYSICIAN ASSISTANT

## 2023-10-19 PROCEDURE — 99000 SPECIMEN HANDLING OFFICE-LAB: CPT | Performed by: PATHOLOGY

## 2023-10-19 PROCEDURE — 85390 FIBRINOLYSINS SCREEN I&R: CPT | Mod: 26 | Performed by: PATHOLOGY

## 2023-10-19 PROCEDURE — 71046 X-RAY EXAM CHEST 2 VIEWS: CPT | Performed by: RADIOLOGY

## 2023-10-19 PROCEDURE — 86901 BLOOD TYPING SEROLOGIC RH(D): CPT | Performed by: PHYSICIAN ASSISTANT

## 2023-10-19 NOTE — PROGRESS NOTES
Psychosocial Assessment For Kidney Transplantation  Patient Name/ Age: Dax Villareal 32 year old   Medical Record #: [id number]  Duration of Interview:     30 min  Process:   Face-to-Face Interview                (counseling < 50%)   Present at Appointment: Dax and Noni (sister)        : RADHA Marin  Date:  October 19, 2023        Type of transplant: Kidney     Donor type:      Cadaver   Prior Transplants:    No Status of Transplant:           Current Living Situation    Location:   [address]   [place] [address]  With Whom: lives alone       Family/ Social Support:    Dax resides in an independent apartment in [place], [place]. He previously lived in a single family home with his mother who passed away one year ago. Prior to that point, he did reside at several different transitional care facilities and moved around frequently.     Dax is not  or in a relationship. He does not have any children. He has two living siblings, Noni ([place], [place]) and Gunner ([place], [place]).     Dax was unable to validate his post kidney transplant at this time. His sister indicated she would assist for a certain duration. He additionally mentioned his friend Josh- this is unclear.    available, occasional   Committed Relationship:     Single   Other Supports:     Dax appears to have a very limited support network. He reports that he is close with one aunt, Danitza. He also mentioned a friend named Josh. He was unable to articulate how helpful his support system would be for him post transplant.     available with reservation       Activities/ Functional Ability    Current Level:    Dax utilizes a cane/walker for longer distances. He appears to struggle with understanding needed transplant material and being appropriately engaged in a conversation.     Dax reports that he has been assessed via [facility] for a CADI waiver. He was approved for 70 hours a week of PCA. SW informed Dax  "that this is highly unlikely given the [facility] guidelines for approving hours.     His sister noted that he was approved but does not obtain any services currently. He reports that he briefly did get services through [facility] but his friend (PCA) was \"not getting paid.\" He was unable to identify the agency or source of this brief service. He notes that he has people \"interviewing\" to be his PCA so he will get more assistance soon. He further identified several individuals with the [facility] but all were different  whom only worked with Dax for brief durations.    cane/walker, dependent with ADL's, and independent with ADL's     Transportation other: Dax does not drive, utilizes med cab       Vocational/Employment/Financial     Employment   disabled   Job Description    It appears when he last held any source of employment (if any). He has not held a job in several years.      Income   SSDI   Insurance    *unclear if he will be eligible for ESRD Medicare given lack of work quarters     At this time, patient can afford medication costs:  Yes  [place]- [facility] PMAP       Medical Status    Current Mode of Treatment for ESRD Dialysis   Complications None       Behavioral    Tobacco Use Yes  Chemical Dependency Yes    Dax reports that he utilizes about 5 cigarettes daily. He is aware that he will be asked to quit or wean down.   Dax has a vast history of chemical dependency. He participated in a liver evaluation in 2022. He reports that \"things got bad during COVID,\" and he drank significantly during this time period leading to alcoholic cirrhosis.     Per liver evaluation: \"Dax has a history of misuse/overuse of alcohol, with his last use May 2021. Prior to May 2021, he would consume alcohol 5-7 days a week. A 1.75L would last him 3-5 days depending on the week.\"     Psychiatric Impairment Yes     Dax reports that he has a history of anxiety, depression and PTSD. He indicated that he has seen a psychiatrist " "but does not obtain ongoing psychotherapy. No history of psychotherapy, psychiatric hospitalization or past/current suicidal thoughts.     Per last psychiatry note--      \"Currently struggling with anxiety and depression symptoms however he is not interested in any psychotropics interventions and is requesting for Xanax and Adderall.  He has been on Xanax before and is currently on Adderall.  Primary care is prescribing Adderall.  We did discuss that stimulants can worsen symptoms of anxiety.  We also discussed that his current symptoms of depression and anxiety and he may benefit from antidepressants and antianxiety medications.     I did share my recommendation cutting down the weaning off from stimulants and focusing on treating depression and anxiety symptoms with noncontrolled medications.  Patient would like to continue taking his stimulants.  PCP is prescribing stimulants.\"     SW would agree with assessment that psyhchotherapy is needed for persisting symptoms. There was a referral made.     Reading Ability: Good  Education Level: High School Recent Legal History No      Coping Style/Strategies: hunting, fishing, being outdoors       Ability to Adhere to Complex Medical Regime: No    Adherence History:    Overall, Dax appeared to possess very poor insight in the transplant process. He reported adherence but this is unclear given his presentation throughout this interview.        Education  _X_ Medicare  _X_ Rehabilitation  _X_ Donor issues  _X_ Community resources  _X_ Post discharge housing  _X_ Financial resources  _X_ Medical insurance options  _X_ Psych adjustment  _X_ Family adjustment  _X_ Health Care Directive Provided Education   Psychosocial Risks of Transplant Reviewed and Discussed:  _X_ Increased stress related to emotional,            family, social, employment or financial           situation  _X_ Effect on work and/or disability benefits  _X_ Effect on future health and life           " insurance  _X_ Transplant outcome expectations may           not be met  _X_ Mental Health Risks: anxiety,           depression, PTSD, guilt, grief and           chronic fatigue     Notable Items:   None noted.       Final Evaluation/Assessment   Patient seemed to not process information well. Appeared to have poor insight, poor motivation and unable to follow post transplant requirements. Behavior was disengaged, not appropriate during interview. Has adequate income and insurance coverage. Unclear social support. There are several major contraindications noted for transplant.  At this time patient does not appear to understand the risks and benefits of transplant.      Recommendation  Conditional    Dax presented with loose associations, word salad, intermittent contact and not intact. He appeared to have a difficult time participating appropriately in this interview resulting in SW to re direct Dax back to the original question on multiple episodes. He presentation could be due to past history of liver cirrhosis.     SW would recommend NEUROPSYCH to assess his baseline. It appears that he has cognitively declined since his last visit during the liver evaluation. His support is additionally unclear. He mentions his sister and friend. He was unable to articulate what services he currently obtains through the [facility] despite being re-directed multiple times.     Dax also has anxiety and depression that is not appropriately managed. He is not seeing a therapist which was encouraged during his last provider visit. He will need consistent mental health support to successful. SW would also recommended intermittent Peth testing given vast CD history.      Selection Criteria Met:  Plan for support No  Chemical Dependence No  Smoking No  Mental Health No  Adequate Finances Yes    Signature: RADHA Marin    Title: Clinical

## 2023-10-19 NOTE — LETTER
10/19/2023         RE: Dax Villareal  [address]  [place] [address]        Dear Colleague,    Thank you for referring your patient, Dax Villareal, to the [facility] TRANSPLANT [facility]. Please see a copy of my visit note below.    [facility] Solid Organ Transplant  Outpatient MNT: Kidney Transplant Evaluation    Current BMI: 28 (HT 67 in,  lbs/81 kg)  BMI guideline for kidney transplant up to a BMI of 40 / per surgeon discretion     Frailty Assessment -- Not Frail (2/5 points)- reported exhaustion, low activity level    *Scored Frail (4/5 points) 6/2022    Recommended Interventions to Address Frailty:  Continue with planned PT     Time Spent: 30 minutes  Visit Type: follow up (saw pt for liver kidney eval 6/2022)  Referring Physician: Chandler   Pt accompanied by: his sister     History of previous txp: none   Dialysis: yes    Dialysis Info: 6/2021 approximately, currently goes @ 1 pm  Protein supplement: none     Nutrition Assessment  Pt was declined for a liver txp last year. He has a h/o necrotizing pancreatitis, ROSA (prescribed Creon, unsure if he is taking this), h/o etoh cirrhosis. It was rather difficult to keep patient on track today.     When I saw pt last year he was living in a transitional care unit, but he has now been on his own for ~14 months.   He recently started receiving [employer] meal delivery 2 weeks ago.     Vitamins, Supplements, Pertinent Meds: none   Herbal Medicines/Supplements: none     Edema: none     Weight hx: has regained some muscle, but also fat mass (up ~30 lbs since I saw pt last summer)  Wt Readings from Last 10 Encounters:   10/19/23 81.2 kg (179 lb 1.6 oz)   10/11/23 78.9 kg (174 lb)   08/25/23 79 kg (174 lb 2.6 oz)   05/22/23 75 kg (165 lb 5.5 oz)   04/17/23 80.7 kg (178 lb)   01/23/23 77.6 kg (171 lb)   10/06/22 67.5 kg (148 lb 13 oz)   08/31/22 65.3 kg (144 lb)   07/13/22 75.8 kg (167 lb)   07/12/22 66.7 kg (147 lb)     Diet  "Recall  Breakfast Eats BF 60% of the time- beef jerky; leftover chicken & rice, szechuan beans, potato (from [employer])   Lunch HD days- [employer] (Italian BMT or meatball 12\", but saves part for later) or brings leftovers, spaghetti    Dinner [employer] pizza x 1 + 2 pieces of cheesy bread; chicken, potato, beans ([employer]); makes steak/pork, veggies, etc from scratch at home    Snacks [employer] bars, popcorn, BF burritos (homemade w/ potatoes, onions, vyas, mushrooms, eggs), strawberries   Beverages Milk (2-3 cups, but not daily), juice (apple, white grape- a few cups/week), pop (3 cans Mountain Dew/day)   Alcohol None    Dining out Difficult to clarify- pt's diet recall mostly consists of food outside the home but later he reports only 5% of food intake is from outside the home     Physical Activity  Pt reports he is supposed to have a PCA but is in between services now  Does ADLs ok- gets tired out sometimes but not always   Has a walker & power wheelchair at least since our last visit (uses w/c mostly- d/t bilateral neuropathy, balance issues, deconditioning, etc)  He reports his winter exercise is \"putting on a lot of warm clothes\", which takes 30 minutes  Can walk >500 ft w/ walker  Last time he did PT - spring time   ?plan to start water aerobics with [employer]  10/9 Phos 2.6 K 3.1     Nutrition Diagnosis  No nutrition diagnosis identified at this time     Nutrition Intervention  Nutrition education provided:  Discussed sodium intake (low sodium foods and drinks, seasoning food without salt and tips for low sodium diet).  Reviewed wnl / low K/Phos levels. Discussed protein needs w/ dialysis. Reviewed functional status improvements and continuing to work with PT, etc.     Reviewed post txp diet guidelines in brief (will review in further detail post txp):  (1) Review of proper food safety measures d/t immunosuppressant therapy post-op and increased risk for food-borne illness    (2) Avoid the " following post txp d/t risk for rejection, unknown effects on the organs, and/or potential interactions with immunosuppressants:  - Herbal, Chinese, holistic, chiropractic, natural, alternative medicines and supplements  - Detoxes and cleanses  - Weight loss pills  - Protein powders or other products with extracts or herbs (ie green tea extract)    (3) Med regimen and possible side effects    Patient Understanding: Pt verbalized understanding of education provided.  Expected Engagement: Good  Follow-Up Plans: PRN     Nutrition Goals  No nutrition goals identified at this time     Luisa Choudhary, RD, LD, CCTD                                      TRANSPLANT NEPHROLOGY RECIPIENT EVALUATION NOTE    Assessment and Plan:  # Kidney Transplant Evaluation: Patient is a marginal candidate overall. Benefits of a living donor transplant were discussed.    # ESKD: thought likely multifactorial with contrast exposure, sepsis/pancreatitis, bile nephropathy, and HRS physiology. He has been doing OK on dialysis since May 2021. He is ABO- B, cPRA pending, and is unclear if he would have potential living donors.     # Psychosocial Concerns:    - Concern regarding patient's insight into medical history/current issues and his ability to understand and comply with a complex post-transplant regimen. Recommend neuropsych eval and testing.   - Post-transplant support plan: unverified, needs further attention. Appreciate social work input.   - ADHD, anxiety, and depression: ADHD managed by PCP with Adderall. Takes xanax. Followed by psychiatry, but not currently doing any therapy. Appreciate social work input.    # Limited Physical Function: mostly using motorized wheelchair to get around. Recommend PT to improve overall physical function.     # EtOH Cirrhosis: previously complicated by ascites (no recent paracentesis) and HE., but patient reports no longer taking lactulose. Most recent imaging was a CT chest PE study done August 2023 that  showed findings suggestive of cirrhosis and mild splenomegaly (14.2 cm). June 2021 EGD with grade I varices, no repeat available to review. Platelets > 100k. Normal LFTs. Not currently followed by hepatologist and recommend he re-establish care. Reports sobriety since early 2021, unclear if he completed chem dep, and this will need to be clarified.    # Chronic Abdominal Pain  # Chronic Nausea/Vomiting  # Necrotizing Pancreatitis: episode of necrotizing pancreatitis requiring endoscopic transluminal drainage and serial necrosectomies as well as a placement of a GJ tube which has since been removed. Last seen here by GI (Dr. Will) October 2022 with recommendations to consider replacement of feeding tube, gastric emptying study and upper GI small bowel follow through, but patient has not done either. At times patient reports symptoms have resolved, but then notes symptoms persist but have improved.    # Pancreatic Insufficiency: taking Creon.    # Cardiac Risk: CAD with CTA coronary angiogram July 2022 placing patient in high percentile, pericarditis with pericardial tamponade s/p pericardial window (October 2021). Preserved EF and no effusion on 2022 ECHO.      # Hypotension: midodrine 10 mg 1-2 times during HD runs. Does not require midodrine on non-HD days.    # Chronic Pain: 2/2 to above. Noted to be taking suboxone at time of initial evaluation in 2022, but patient denies previous use. Expresses interest in medical marijuana.     # Current Tobacco Use: 1/4 PPD currently, previously more, 10-15 year history. Will need PFTs.     # Health Maintenance: Dental: due, very poor dentition.     Recommendations:  - Appreciate SW input regarding psychosocial concerns. Recommend neuropsych eval and testing and confirmation of solid post-transplant support plan prior to moving forward with the remainder of his evaluation (cardiac risk assessment, PFTs, dental).   - He should re-establish hepatology and pancreatic/GI  clinic.  - Recommend PT to improve physical function.   - Clarify if still taking suboxone.     - Discussed the risks and benefits of a transplant, including the risk of surgery and immunosuppression medications.  Patient's overall evaluation will be discussed in the Transplant Program's regular meeting with a final recommendation on the patients suitability for transplant to be made at that time.    Evaluation:  Dax Villareal was seen in consultation at the request of Dr. Cipriano Arteaga for evaluation as a potential kidney transplant recipient.    Reason for Visit:  Dax Villareal is a 32 year old male with ESKD who presents for kidney transplant evaluation.    History of Present Illness:  Mr. Villareal is a 32-year-old male with ESKD on dialysis since May 2021, alcoholic cirrhosis, necrotizing pancreatitis, pancreatic insufficiency, CAD based on CTA findings, hypotension requiring midodrine on dialysis days, chronic pain, current tobacco use, ADHD, depression, and anxiety.          Kidney Disease Hx:        Prolonged admit ~May-October 2021 with EtOH cirrhosis, septic shock, necrotizing pancreatitis, decompensated liver disease, and RADHA requiring dialysis initiation. ESKD thought likely multifactorial with contrast exposure, sepsis/pancreatitis, bile nephropathy, and  HRS physiology. It appears he had baseline normal renal function when he first presented to [facility] in May 2021. He has remained anuric and dialysis dependent since.     Initially evaluated here virtually July 2022 with many concerns such as frailty, chronic nausea/vomiting, needing to complete chemical dependency, sort out social support plan, and follow up in pancreatitis clinic. Unclear, but appears he was lost to follow up until July 2023, and then had trouble reaching patient to schedule appt.     HD has been going OK, although he has had transportation issues resulting in missed runs lately. Just recently started using an AVF,  catheter now out. Still has urine output. Needing midodrine 10-20 mg during HD runs.     Still using motorized wheelchair as main source of ambulation. Didn't bring it today as he reports missing his transportation that typically takes him with his chair. Ended up taking his sister's car and wheelchair wouldn't fit. Does occasionally use walker. Can walk about 500 feet with his walker, but unclear how often he does this. No exercise. Does ADLs but occasionally gets fatigued doing so.     Social support plan is still unclear.    Did see pancreatitis clinic but no follow up in ~ 1 year. Unclear if he continues to follow with hepatology, but sounds like he hasn't been seen in some time- he thinks since his 2021 admit here, although looks like he was seen by [facility] hepatology June 2022. Was prescribed lactulose at that time, patient reports he is no longer taking this.    Previously evaluated for liver transplant Summer 2022, unclear outcome, but not currently listed.           Primary Nephrologist: Dr. Mancera       H/o Kidney Stones: No       H/o Recurrent/Frequent UTI: No         Diabetic Hx: None           Cardiac/Vascular Disease Risk Factors:        Known CAD: Yes       Known PAD/Caludication Symptoms: No       Known Heart Failure: No       Arrhythmia: No       Pulmonary Hypertension: No       Valvular Disease: No       Other: Hypotension on midodrine         Viral Serology Status       CMV IgG Antibody: Positive       EBV IgG Antibody: Negative         Volume Status/Weight:        BMI: Body mass index is 28.05 kg/m .         Functional Capacity/Frailty:        See above    Fatigue/Decreased Energy: [] No [x] Yes    Chest Pain or SOB with Exertion: [x] No [] Yes    Significant Weight Change: [x] No [] Yes    Nausea, Vomiting or Diarrhea: [] No [] Yes unclear   Fever, Sweats or Chills:  [x] No [] Yes    Leg Swelling [] No [] Yes      Allergy Testing Questions:   Medication that caused a reaction None   Antibiotics used  that didn't give an allergic reaction?  Patient doesn't know    COVID Vaccination Up To Date: Not asked    Potential Living Kidney Donors: Not sure    Review of Systems:  A comprehensive review of systems was obtained and negative, except as noted in the HPI or PMH.    Past Medical History:   Medical record was reviewed and PMH was discussed with patient and noted below.  Past Medical History:   Diagnosis Date     ADHD 2008     Alcohol abuse, in remission      Alcoholic cirrhosis of liver with ascites (H)      Anemia in chronic kidney disease      Chronic pain      Current smoker      End stage renal disease (H)      Hypotension, unspecified hypotension type      Malnutrition (H24)      Metabolic acidosis      Necrotizing pancreatitis      Pancreatic insufficiency      Pericarditis      Recurrent pleural effusion        Past Social History:   Past Surgical History:   Procedure Laterality Date     AV FISTULA PLACEMENT, BRACHIOCEPHALIC Left 05/11/2022    [facility]     COMBINED ESOPHAGOSCOPY, GASTROSCOPY, DUODENOSCOPY (EGD), TISSUE APPOSI N/A 09/20/2021    Procedure: ESOPHAGOGASTRODUODENOSCOPY WITH SUTURE FISTULA CLOSURE, argon plasma coagulation;  Surgeon: Jose Quigley MD;  Location: [facility] OR     ENDOSCOPIC INSERTION TUBE GASTROSTOMY N/A 07/13/2021    Procedure: Upper endoscopy, INSERTION, PEG-J TUBE and Gastropexy;  Surgeon: Rayshanw Will MD;  Location: [facility] OR     ENDOSCOPIC INSERTION TUBE GASTROSTOMY  09/13/2021    Procedure: PEG/J PLACEMENT.;  Surgeon: Rayshawn Will MD;  Location: [facility] OR     ENDOSCOPIC ULTRASOUND UPPER GASTROINTESTINAL TRACT (GI) N/A 07/21/2021    Procedure: ENDOSCOPIC ULTRASOUND, ESOPHAGOSCOPY / UPPER GASTROINTESTINAL TRACT (GI) with cyst gastrostomy, dilation;  Surgeon: Guru Yecenia Chacko MD;  Location: [facility] OR     ENDOSCOPIC ULTRASOUND, ESOPHAGOSCOPY, GASTROSCOPY, DUODENOSCOPY (EGD), NECROSECTOMY N/A 08/11/2021    Procedure: ESOPHAGOGASTRODUODENOSCOPY (EGD)  with necrosectomy, stent exchange.;  Surgeon: Amadou Beasley MD;  Location: [facility] OR     ESOPHAGOSCOPY, GASTROSCOPY, DUODENOSCOPY (EGD), COMBINED N/A 07/28/2021    Procedure: ESOPHAGOGASTRODUODENOSCOPY (EGD), gastrostomy apposition, Necrosectomy, stent exchange.;  Surgeon: Rayshawn Will MD;  Location: [facility] OR     ESOPHAGOSCOPY, GASTROSCOPY, DUODENOSCOPY (EGD), COMBINED N/A 08/06/2021    Procedure: ESOPHAGOGASTRODUODENOSCOPY (EGD) with necrosectomy, and stents exchanged;  Surgeon: Jose Quigley MD;  Location: [facility] OR     ESOPHAGOSCOPY, GASTROSCOPY, DUODENOSCOPY (EGD), COMBINED Left 09/13/2021    Procedure: ESOPHAGOGASTRODUODENOSCOPY (EGD), GASTROSTOMY TUBE REMOVAL, FISTULAR CLOSURE ENDOSCOPIC.;  Surgeon: Rayshawn Will MD;  Location: [facility] OR     IR ABSCESS TUBE CHANGE  07/14/2021     IR NG TUBE PLACEMENT REQ RAD & FLUORO  10/26/2021     IR PARACENTESIS  07/21/2021     IR SINOGRAM INJECTION DIAGNOSTIC  08/25/2021     IR THORACENTESIS  02/05/2022     ORTHOPEDIC SURGERY Right     fracture repair     REPLACE GASTROSTOMY TUBE, PERCUTANEOUS N/A 07/28/2021    Procedure: gastro-jejunostomy tube exchange;  Surgeon: Rayshawn Will MD;  Location: [facility] OR     Personal history of bleeding or anesthesia problems: No    Family History:  Family History   Problem Relation Age of Onset     Attention Deficit Disorder Mother      Alcoholism Mother      Cancer Mother      Alcoholism Father      Alcoholism Sister      Alcoholism Brother        Personal History:   Social History     Socioeconomic History     Marital status: Single     Spouse name: Not on file     Number of children: Not on file     Years of education: Not on file     Highest education level: Not on file   Occupational History     Not on file   Tobacco Use     Smoking status: Some Days     Packs/day: .25     Types: Cigarettes     Passive exposure: Current     Smokeless tobacco: Never     Tobacco comments:     8/24/2021 Patient did not want to discuss  "quitting but would like 4 mg lozenge to use during admission. Patient also accepted \"Quitting for Good\" workbook   Vaping Use     Vaping Use: Never used     Passive vaping exposure: Yes   Substance and Sexual Activity     Alcohol use: Not Currently     Comment: \"no alcohol for over 1 year ago\" per pt on 5/12/22     Drug use: No     Sexual activity: Not Currently     Partners: Female   Other Topics Concern     Not on file   Social History Narrative     Not on file     Social Determinants of Health     Financial Resource Strain: Low Risk  (10/10/2023)    Financial Resource Strain      Within the past 12 months, have you or your family members you live with been unable to get utilities (heat, electricity) when it was really needed?: No   Recent Concern: Financial Resource Strain - Medium Risk (9/1/2023)    Overall Financial Resource Strain (CARDIA)      Difficulty of Paying Living Expenses: Somewhat hard   Food Insecurity: High Risk (10/10/2023)    Food Insecurity      Within the past 12 months, did you worry that your food would run out before you got money to buy more?: Yes      Within the past 12 months, did the food you bought just not last and you didn t have money to get more?: No   Transportation Needs: High Risk (10/10/2023)    Transportation Needs      Within the past 12 months, has lack of transportation kept you from medical appointments, getting your medicines, non-medical meetings or appointments, work, or from getting things that you need?: Yes   Physical Activity: Unknown (9/1/2023)    Exercise Vital Sign      Days of Exercise per Week: Patient refused      Minutes of Exercise per Session: Patient refused   Recent Concern: Physical Activity - Inactive (6/5/2023)    Exercise Vital Sign      Days of Exercise per Week: 0 days      Minutes of Exercise per Session: 0 min   Stress: Stress Concern Present (9/1/2023)    [employer] [place] [employer] - Occupational Stress Questionnaire      Feeling of " Stress : Rather much   Social Connections: Unknown (9/1/2023)    Social Connection and Isolation Panel [NHANES]      Frequency of Communication with Friends and Family: Twice a week      Frequency of Social Gatherings with Friends and Family: Patient refused      Attends Temple Services: Patient refused      Active Member of Clubs or Organizations: No      Attends Club or Organization Meetings: Never      Marital Status: Patient refused   Interpersonal Safety: Low Risk  (10/11/2023)    Interpersonal Safety      Do you feel physically and emotionally safe where you currently live?: Yes      Within the past 12 months, have you been hit, slapped, kicked or otherwise physically hurt by someone?: No      Within the past 12 months, have you been humiliated or emotionally abused in other ways by your partner or ex-partner?: No   Housing Stability: High Risk (10/10/2023)    Housing Stability      Do you have housing? : Yes      Are you worried about losing your housing?: Yes       Allergies:  No Known Allergies    Medications:  Current Outpatient Medications   Medication Sig     ALPRAZolam (XANAX) 0.5 MG tablet Take 1 tablet (0.5 mg) by mouth daily as needed for anxiety     [START ON 11/2/2023] amphetamine-dextroamphetamine (ADDERALL XR) 30 MG 24 hr capsule Take 1 capsule (30 mg) by mouth daily for 30 days     [START ON 12/1/2023] amphetamine-dextroamphetamine (ADDERALL XR) 30 MG 24 hr capsule Take 1 capsule (30 mg) by mouth daily for 30 days     [START ON 1/2/2025] amphetamine-dextroamphetamine (ADDERALL XR) 30 MG 24 hr capsule Take 1 capsule (30 mg) by mouth daily for 30 days     amphetamine-dextroamphetamine (ADDERALL XR) 30 MG 24 hr capsule Take 1 capsule (30 mg) by mouth daily     amphetamine-dextroamphetamine (ADDERALL) 20 MG tablet 2 tab at noon     [START ON 11/9/2023] amphetamine-dextroamphetamine (ADDERALL) 20 MG tablet 2 tab at noon     [START ON 12/7/2023] amphetamine-dextroamphetamine (ADDERALL) 20 MG tablet  2 tab tab at noon     amphetamine-dextroamphetamine (ADDERALL) 20 MG tablet 2 tab at noon.     amylase-lipase-protease (CREON 24) 91146-64431 units CPEP per EC capsule 1-2 capsules by Per Feeding Tube route every 4 hours     DULoxetine (CYMBALTA) 60 MG capsule Take 1 capsule (60 mg) by mouth daily     lidocaine (XYLOCAINE) 5 % external ointment Apply topically as needed for moderate pain (qid as needed) Apply to feet and knees qid for 10 days, then as needed     midodrine (PROAMATINE) 10 MG tablet One tab before Dialysis     midodrine (PROAMATINE) 5 MG tablet 2 tablets (10 mg) by Per Feeding Tube route 3 times daily     pantoprazole (PROTONIX) 40 MG EC tablet Take 1 tablet (40 mg) by mouth daily     pregabalin (LYRICA) 300 MG capsule One tab bid ( increased )     prochlorperazine (COMPAZINE) 5 MG tablet Take 1 tablet (5 mg) by mouth every 8 hours as needed for nausea or vomiting     sevelamer carbonate, RENVELA, 0.8 GM PACK Packet 2 packets (1.6 g) by Oral or Feeding Tube route 3 times daily     silver nitrate (ARZOL) 75-25 % miscellaneous Apply topically daily as needed for wound care     tiZANidine (ZANAFLEX) 2 MG tablet Take 1 tablet (2 mg) by mouth 3 times daily as needed for muscle spasms (as needed)     torsemide (DEMADEX) 20 MG tablet 2 tab daily     vitamin (B COMPLEX) capsule Take 1 each by mouth     acetaminophen (TYLENOL) 325 MG/10.15ML solution 10.15 mLs (325 mg) by Oral or PEGJ tube route every 4 hours as needed for mild pain or fever     Cetaphil Moisturizing (CETAPHIL) external lotion Apply topically 2 times daily as needed for dry skin or irritation (Apply to chest)     diclofenac (VOLTAREN) 1 % topical gel Apply 2 gm to knees and feet qid as needed     folic acid (FOLVITE) 1 MG tablet 1 tablet (1 mg) by Oral or Feeding Tube route daily (Patient not taking: Reported on 7/20/2023)     Guar Gum (FIBER MODULAR, NUTRISOURCE FIBER,) packet 1 packet by Per Feeding Tube route 3 times daily (Patient not  "taking: Reported on 7/20/2023)     lactulose (CHRONULAC) 10 GM/15ML solution Take 20 g by mouth     naloxone (NARCAN) 4 MG/0.1ML nasal spray Spray 1 spray (4 mg) into one nostril alternating nostrils as needed for opioid reversal every 2-3 minutes until assistance arrives     nortriptyline (PAMELOR) 25 MG capsule Take 1 capsule (25 mg) by mouth At Bedtime     potassium chloride (KLOR-CON) 20 MEQ packet 20 mEq by Per Feeding Tube route daily     rOPINIRole (REQUIP) 0.25 MG tablet Take 1 tablet (0.25 mg) by mouth At Bedtime     sodium bicarbonate 325 MG tablet 1 tablet (325 mg) by Per Feeding Tube route every 4 hours (Patient not taking: Reported on 7/20/2023)     triamcinolone (KENALOG) 0.1 % external cream Apply bid for 3- 5 days, as needed (Patient not taking: Reported on 7/20/2023)     No current facility-administered medications for this visit.       Vitals:  /77   Pulse 118   Ht 1.702 m (5' 7\")   Wt 81.2 kg (179 lb 1.6 oz)   SpO2 95%   BMI 28.05 kg/m      Exam:  GENERAL APPEARANCE: alert and no distress  HENT: mouth without ulcers or lesions, very poor dentition  RESP: lungs clear to auscultation - no rales, rhonchi or wheezes  CV: regular rhythm, normal rate, no rub, no murmur  EDEMA: no LE edema bilaterally  ABDOMEN: soft, nondistended, nontender  MS: extremities normal - no gross deformities noted, no evidence of inflammation in joints, no muscle tenderness  SKIN: no rash    Results:   Recent Results (from the past 336 hour(s))   Treponema Abs w Reflex to RPR and Titer [[id number]]    Collection Time: 10/19/23  1:28 PM   Result Value Ref Range    Treponema Antibody Total Nonreactive Nonreactive   HIV Antigen Antibody Combo Pretransplant Cascade    Collection Time: 10/19/23  1:28 PM   Result Value Ref Range    HIV Antigen Antibody Combo Pretransplant Nonreactive Nonreactive   Hepatitis C antibody [[id number]]    Collection Time: 10/19/23  1:28 PM   Result Value Ref Range    Hepatitis C Antibody Nonreactive " Nonreactive   Hepatitis B surface antigen [[id number]]    Collection Time: 10/19/23  1:28 PM   Result Value Ref Range    Hepatitis B Surface Antigen Nonreactive Nonreactive   Hepatitis B Surface Antibody [[id number]]    Collection Time: 10/19/23  1:28 PM   Result Value Ref Range    Hepatitis B Surface Antibody Instrument Value 10.43 <8.00 m[IU]/mL    Hepatitis B Surface Antibody Indeterminate    Hepatitis B core antibody [[id number]]    Collection Time: 10/19/23  1:28 PM   Result Value Ref Range    Hepatitis B Core Antibody Total Nonreactive Nonreactive   Partial thromboplastin time [LAB56]    Collection Time: 10/19/23  1:28 PM   Result Value Ref Range    aPTT 34 22 - 38 Seconds   Comprehensive metabolic panel [LAB17]    Collection Time: 10/19/23  1:28 PM   Result Value Ref Range    Sodium 138 135 - 145 mmol/L    Potassium 3.1 (L) 3.4 - 5.3 mmol/L    Carbon Dioxide (CO2) 32 (H) 22 - 29 mmol/L    Anion Gap 13 7 - 15 mmol/L    Urea Nitrogen 25.1 (H) 6.0 - 20.0 mg/dL    Creatinine 3.10 (H) 0.67 - 1.17 mg/dL    GFR Estimate 26 (L) >60 mL/min/1.73m2    Calcium 10.5 (H) 8.6 - 10.0 mg/dL    Chloride 93 (L) 98 - 107 mmol/L    Glucose 160 (H) 70 - 99 mg/dL    Alkaline Phosphatase 121 40 - 129 U/L    AST 26 0 - 45 U/L    ALT 29 0 - 70 U/L    Protein Total 8.9 (H) 6.4 - 8.3 g/dL    Albumin 5.2 3.5 - 5.2 g/dL    Bilirubin Total 0.7 <=1.2 mg/dL   CBC with platelets and differential    Collection Time: 10/19/23  1:28 PM   Result Value Ref Range    WBC Count 6.4 4.0 - 11.0 10e3/uL    RBC Count 4.28 (L) 4.40 - 5.90 10e6/uL    Hemoglobin 13.3 13.3 - 17.7 g/dL    Hematocrit 37.5 (L) 40.0 - 53.0 %    MCV 88 78 - 100 fL    MCH 31.1 26.5 - 33.0 pg    MCHC 35.5 31.5 - 36.5 g/dL    RDW 12.9 10.0 - 15.0 %    Platelet Count 212 150 - 450 10e3/uL    % Neutrophils 79 %    % Lymphocytes 13 %    % Monocytes 4 %    Mids % (Monos, Eos, Basos)      % Eosinophils 3 %    % Basophils 1 %    % Immature Granulocytes 0 %    NRBCs per 100 WBC 0 <1 /100    Absolute  Neutrophils 5.0 1.6 - 8.3 10e3/uL    Absolute Lymphocytes 0.8 0.8 - 5.3 10e3/uL    Absolute Monocytes 0.3 0.0 - 1.3 10e3/uL    Mids Abs (Monos, Eos, Basos)      Absolute Eosinophils 0.2 0.0 - 0.7 10e3/uL    Absolute Basophils 0.0 0.0 - 0.2 10e3/uL    Absolute Immature Granulocytes 0.0 <=0.4 10e3/uL    Absolute NRBCs 0.0 10e3/uL   Adult Type and Screen    Collection Time: 10/19/23  1:28 PM   Result Value Ref Range    ABO/RH(D) B POS     Antibody Screen Negative Negative    SPECIMEN EXPIRATION DATE [id number]    ABO and Rh    Collection Time: 10/19/23  1:31 PM   Result Value Ref Range    ABO/RH(D) B POS     SPECIMEN EXPIRATION DATE [id number]    EKG 12-lead, tracing only [EKG1]    Collection Time: 10/19/23  1:34 PM   Result Value Ref Range    Systolic Blood Pressure  mmHg    Diastolic Blood Pressure  mmHg    Ventricular Rate 96 BPM    Atrial Rate 96 BPM    VT Interval 160 ms    QRS Duration 94 ms     ms    QTc 457 ms    P Axis 31 degrees    R AXIS -24 degrees    T Axis 58 degrees    Interpretation ECG       Sinus rhythm  Normal ECG  When compared with ECG of 22-MAY-2023 03:56,  No significant change was found     UA with Microscopic reflex to Culture    Collection Time: 10/19/23  1:37 PM    Specimen: Urine, Midstream   Result Value Ref Range    Color Urine Yellow Colorless, Straw, Light Yellow, Yellow    Appearance Urine Clear Clear    Glucose Urine Negative Negative mg/dL    Bilirubin Urine Negative Negative    Ketones Urine Negative Negative mg/dL    Specific Gravity Urine 1.018 1.003 - 1.035    Blood Urine Negative Negative    pH Urine 7.5 (H) 5.0 - 7.0    Protein Albumin Urine 30 (A) Negative mg/dL    Urobilinogen Urine Normal Normal, 2.0 mg/dL    Nitrite Urine Negative Negative    Leukocyte Esterase Urine Negative Negative    Mucus Urine Present (A) None Seen /LPF    RBC Urine 1 <=2 /HPF    WBC Urine 1 <=5 /HPF    Squamous Epithelials Urine <1 <=1 /HPF    Hyaline Casts Urine 2 <=2 /LPF  "  Echocardiogram Complete    Collection Time: 10/19/23  2:18 PM   Result Value Ref Range    LVEF  60-65%        Review of prior external note(s) from - [facility] information from [facility], and [facility]  reviewed  I spent a total of 107 minutes on the day of the visit.   Time spent by me doing chart review, history and exam, documentation and further activities per the note  {Provider  Link to [facility] Help Grid :309096}            Patient is here to understand the risk benefits alternatives of renal transplantation.  For the detailed clinical history: Please read the notes of my nephrology colleague.      Briefly: Patient has end-stage renal disease secondary to Acute kidney injury that did not recover.  He has a very complicated medical history.  He has alcohol use disorder resulting in cirrhosis of the liver, acute pancreatitis with pseudocyst requiring multiple interventions endoscopy currently, septic shock, acute kidney injury which has progressed to end-stage renal disease.  He is currently on dialysis for more than 2 years.  He is also deconditioned and not fully functional.  No known heart disease.  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      Present with him was a family member was very supportive.    /77   Pulse 118   Ht 1.702 m (5' 7\")   Wt 81.2 kg (179 lb 1.6 oz)   SpO2 95%   BMI 28.05 kg/m      Examination of the abdomen: The abdomen is soft there are multiple scars of the previous G-tube GJ tube.  The right lower quadrant appears free.  Right femoral pulses well felt.      Clinical impression:  End-stage renal disease.    Recommendations:  #1.  He has been abstinent from alcohol for about more than a year.  Although he has been previously evaluated for liver transplant, I would recommend getting a repeat hepatology consultation to ensure that the liver disease is stable.  #2.  He needs a CT scan of the abdomen with IV contrast to evaluate " the status of his previous pancreatic pseudocyst as well as the vascular anatomy.  #3.  He is deconditioned and would certainly benefit from outpatient physical therapy and nutritional support.  He should also have assessment of reality.  #4.  Would also recommend a social work consultation regarding his social support as well as financial resources.      Would strongly recommend a living donor kidney transplant.      I had a long discussion with the patient regarding kidney transplantation in general and the following points in particular:    Survival statistics at one, five and ten years following kidney transplantation both for living-related and cadaveric allografts.  The kidney transplant selection committee process.  The complications following kidney transplant that included but were not limited to wound infection, vascular complications, ureter leak, ureteral strictures, and bowel obstruction  The need for lifelong immunosuppressive therapy and the side effects of these medications including specifically the risk of cancer and lymphoma.  The waiting list time of approximately a year or more for cadaveric transplants.  The statistical superiority of a living-related donor and the compelling reasons to encourage that therapy.    The patient understands these issues quite well and is eager to proceed with our recommendation and with transplantation.            Psychosocial Assessment For Kidney Transplantation  Patient Name/ Age: Dax Villareal 32 year old   Medical Record #: [id number]  Duration of Interview:     30 min  Process:   Face-to-Face Interview                (counseling < 50%)   Present at Appointment: Dax neil Cheney (sister)        : RADHA Marin  Date:  October 19, 2023        Type of transplant: Kidney     Donor type:      Cadaver   Prior Transplants:    No Status of Transplant:           Current Living Situation    Location:   [address] [place] [address]   [facility] [id number]   "With Whom: lives alone       Family/ Social Support:    Dax resides in an independent apartment in [place], [place]. He previously lived in a single family home with his mother who passed away one year ago. Prior to that point, he did reside at several different transitional care facilities and moved around frequently.     Dax is not  or in a relationship. He does not have any children. He has two living siblings, Noni ([place], [place]) and Gunner ([place], [place]).     Dax was unable to validate his post kidney transplant at this time. His sister indicated she would assist for a certain duration. He additionally mentioned his friend Josh- this is unclear.    available, occasional   Committed Relationship:     Single   Other Supports:     Dax appears to have a very limited support network. He reports that he is close with one aunt, Danitza. He also mentioned a friend named Josh. He was unable to articulate how helpful his support system would be for him post transplant.     available with reservation       Activities/ Functional Ability    Current Level:    Dax utilizes a cane/walker for longer distances. He appears to struggle with understanding needed transplant material and being appropriately engaged in a conversation.     Dax reports that he has been assessed via [employer] for a CADI waiver. He was approved for 70 hours a week of PCA.  informed Dax that this is highly unlikely given the [facility] guidelines for approving hours.     His sister noted that he was approved but does not obtain any services currently. He reports that he briefly did get services through [facility] but his friend (PCA) was \"not getting paid.\" He was unable to identify the agency or source of this brief service. He notes that he has people \"interviewing\" to be his PCA so he will get more assistance soon. He further identified several individuals with the [facility] but all were different  whom only worked with Dax for " "brief durations.    cane/walker, dependent with ADL's, and independent with ADL's     Transportation other: Dax does not drive, utilizes med cab       Vocational/Employment/Financial     Employment   disabled   Job Description    It appears when he last held any source of employment (if any). He has not held a job in several years.      Income   SSDI   Insurance    *unclear if he will be eligible for ESRD Medicare given lack of work quarters     At this time, patient can afford medication costs:  Yes  [place]- [employer] PMAP       Medical Status    Current Mode of Treatment for ESRD Dialysis   Complications None       Behavioral    Tobacco Use Yes  Chemical Dependency Yes    Dax reports that he utilizes about 5 cigarettes daily. He is aware that he will be asked to quit or wean down.   Dax has a vast history of chemical dependency. He participated in a liver evaluation in 2022. He reports that \"things got bad during COVID,\" and he drank significantly during this time period leading to alcoholic cirrhosis.     Per liver evaluation: \"Dax has a history of misuse/overuse of alcohol, with his last use May 2021. Prior to May 2021, he would consume alcohol 5-7 days a week. A 1.75L would last him 3-5 days depending on the week.\"     Psychiatric Impairment Yes     Dax reports that he has a history of anxiety, depression and PTSD. He indicated that he has seen a psychiatrist but does not obtain ongoing psychotherapy. No history of psychotherapy, psychiatric hospitalization or past/current suicidal thoughts.     Per last psychiatry note--      \"Currently struggling with anxiety and depression symptoms however he is not interested in any psychotropics interventions and is requesting for Xanax and Adderall.  He has been on Xanax before and is currently on Adderall.  Primary care is prescribing Adderall.  We did discuss that stimulants can worsen symptoms of anxiety.  We also discussed that his current symptoms of depression and " "anxiety and he may benefit from antidepressants and antianxiety medications.     I did share my recommendation cutting down the weaning off from stimulants and focusing on treating depression and anxiety symptoms with noncontrolled medications.  Patient would like to continue taking his stimulants.  PCP is prescribing stimulants.\"     SW would agree with assessment that psyhchotherapy is needed for persisting symptoms. There was a referral made.     Reading Ability: Good  Education Level: High School Recent Legal History No      Coping Style/Strategies: hunting, fishing, being outdoors       Ability to Adhere to Complex Medical Regime: No    Adherence History:    Overall, Dax appeared to possess very poor insight in the transplant process. He reported adherence but this is unclear given his presentation throughout this interview.        Education  _X_ Medicare  _X_ Rehabilitation  _X_ Donor issues  _X_ Community resources  _X_ Post discharge housing  _X_ Financial resources  _X_ Medical insurance options  _X_ Psych adjustment  _X_ Family adjustment  _X_ Health Care Directive Provided Education   Psychosocial Risks of Transplant Reviewed and Discussed:  _X_ Increased stress related to emotional,            family, social, employment or financial           situation  _X_ Effect on work and/or disability benefits  _X_ Effect on future health and life           insurance  _X_ Transplant outcome expectations may           not be met  _X_ Mental Health Risks: anxiety,           depression, PTSD, guilt, grief and           chronic fatigue     Notable Items:   None noted.       Final Evaluation/Assessment   Patient seemed to not process information well. Appeared to have poor insight, poor motivation and unable to follow post transplant requirements. Behavior was disengaged, not appropriate during interview. Has adequate income and insurance coverage. Unclear social support. There are several major contraindications noted " for transplant.  At this time patient does not appear to understand the risks and benefits of transplant.      Recommendation  Conditional    Dax presented with loose associations, word salad, intermittent contact and not intact. He appeared to have a difficult time participating appropriately in this interview resulting in SW to re direct Dax back to the original question on multiple episodes. He presentation could be due to past history of liver cirrhosis.     SW would recommend NEUROPSYCH to assess his baseline. It appears that he has cognitively declined since his last visit during the liver evaluation. His support is additionally unclear. He mentions his sister and friend. He was unable to articulate what services he currently obtains through the [facility] despite being re-directed multiple times.     Dax also has anxiety and depression that is not appropriately managed. He is not seeing a therapist which was encouraged during his last provider visit. He will need consistent mental health support to successful. SW would also recommended intermittent Peth testing given vast CD history.      Selection Criteria Met:  Plan for support No  Chemical Dependence No  Smoking No  Mental Health No  Adequate Finances Yes    Signature[facility]    Title: Clinical           Again, thank you for allowing me to participate in the care of your patient.        Sincerely,        Cipriano Arteaga MD

## 2023-10-19 NOTE — PROGRESS NOTES
"Patient is here to understand the risk benefits alternatives of renal transplantation.  For the detailed clinical history: Please read the notes of my nephrology colleague.      Briefly: Patient has end-stage renal disease secondary to Acute kidney injury that did not recover.  He has a very complicated medical history.  He has alcohol use disorder resulting in cirrhosis of the liver, acute pancreatitis with pseudocyst requiring multiple interventions endoscopy currently, septic shock, acute kidney injury which has progressed to end-stage renal disease.  He is currently on dialysis for more than 2 years.  He is also deconditioned and not fully functional.  No known heart disease.  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      Present with him was a family member was very supportive.    /77   Pulse 118   Ht 1.702 m (5' 7\")   Wt 81.2 kg (179 lb 1.6 oz)   SpO2 95%   BMI 28.05 kg/m      Examination of the abdomen: The abdomen is soft there are multiple scars of the previous G-tube GJ tube.  The right lower quadrant appears free.  Right femoral pulses well felt.      Clinical impression:  End-stage renal disease.    Recommendations:  #1.  He has been abstinent from alcohol for about more than a year.  Although he has been previously evaluated for liver transplant, I would recommend getting a repeat hepatology consultation to ensure that the liver disease is stable.  #2.  He needs a CT scan of the abdomen with IV contrast to evaluate the status of his previous pancreatic pseudocyst as well as the vascular anatomy.  #3.  He is deconditioned and would certainly benefit from outpatient physical therapy and nutritional support.  He should also have assessment of reality.  #4.  Would also recommend a social work consultation regarding his social support as well as financial resources.      Would strongly recommend a living donor kidney transplant.      I had a long discussion with the patient " regarding kidney transplantation in general and the following points in particular:    Survival statistics at one, five and ten years following kidney transplantation both for living-related and cadaveric allografts.  The kidney transplant selection committee process.  The complications following kidney transplant that included but were not limited to wound infection, vascular complications, ureter leak, ureteral strictures, and bowel obstruction  The need for lifelong immunosuppressive therapy and the side effects of these medications including specifically the risk of cancer and lymphoma.  The waiting list time of approximately a year or more for cadaveric transplants.  The statistical superiority of a living-related donor and the compelling reasons to encourage that therapy.    The patient understands these issues quite well and is eager to proceed with our recommendation and with transplantation.

## 2023-10-19 NOTE — PROGRESS NOTES
TRANSPLANT NEPHROLOGY RECIPIENT EVALUATION NOTE    Assessment and Plan:  # Kidney Transplant Evaluation: Patient is a marginal candidate overall. Benefits of a living donor transplant were discussed.    # ESKD: thought likely multifactorial with contrast exposure, sepsis/pancreatitis, bile nephropathy, and HRS physiology. He has been doing OK on dialysis since May 2021. He is ABO- B, cPRA pending, and is unclear if he would have potential living donors.     # Psychosocial Concerns:    - Concern regarding patient's insight into medical history/current issues and his ability to understand and comply with a complex post-transplant regimen. Recommend neuropsych eval and testing.   - Post-transplant support plan: unverified, needs further attention. Appreciate social work input.   - ADHD, anxiety, and depression: ADHD managed by PCP with Adderall. Takes xanax. Followed by psychiatry, but not currently doing any therapy. Appreciate social work input.    # Limited Physical Function: mostly using motorized wheelchair to get around. Recommend PT to improve overall physical function.     # EtOH Cirrhosis: previously complicated by ascites (no recent paracentesis) and HE., but patient reports no longer taking lactulose. Most recent imaging was a CT chest PE study done August 2023 that showed findings suggestive of cirrhosis and mild splenomegaly (14.2 cm). June 2021 EGD with grade I varices, no repeat available to review. Platelets > 100k. Normal LFTs. Not currently followed by hepatologist and recommend he re-establish care. Reports sobriety since early 2021, unclear if he completed chem dep, and this will need to be clarified.    # Chronic Abdominal Pain  # Chronic Nausea/Vomiting  # Necrotizing Pancreatitis: episode of necrotizing pancreatitis requiring endoscopic transluminal drainage and serial necrosectomies as well as a placement of a GJ tube which has since been removed. Last seen here by GI (Dr. Will) October 2022  with recommendations to consider replacement of feeding tube, gastric emptying study and upper GI small bowel follow through, but patient has not done either. At times patient reports symptoms have resolved, but then notes symptoms persist but have improved.    # Pancreatic Insufficiency: taking Creon.    # Cardiac Risk: CAD with CTA coronary angiogram July 2022 placing patient in high percentile, pericarditis with pericardial tamponade s/p pericardial window (October 2021). Preserved EF and no effusion on 2022 ECHO.      # Hypotension: midodrine 10 mg 1-2 times during HD runs. Does not require midodrine on non-HD days.    # Chronic Pain: 2/2 to above. Noted to be taking suboxone at time of initial evaluation in 2022, but patient denies previous use. Expresses interest in medical marijuana.     # Current Tobacco Use: 1/4 PPD currently, previously more, 10-15 year history. Will need PFTs.     # Health Maintenance: Dental: due, very poor dentition.     Recommendations:  - Appreciate SW input regarding psychosocial concerns. Recommend neuropsych eval and testing and confirmation of solid post-transplant support plan prior to moving forward with the remainder of his evaluation (cardiac risk assessment, PFTs, dental).   - He should re-establish hepatology and pancreatic/GI clinic.  - Recommend PT to improve physical function.   - Clarify if still taking suboxone.     - Discussed the risks and benefits of a transplant, including the risk of surgery and immunosuppression medications.  Patient's overall evaluation will be discussed in the Transplant Program's regular meeting with a final recommendation on the patients suitability for transplant to be made at that time.    Evaluation:  Dax Villareal was seen in consultation at the request of Dr. Cipriano Arteaga for evaluation as a potential kidney transplant recipient.    Reason for Visit:  Dax Villareal is a 32 year old male with ESKD who presents for kidney transplant  evaluation.    History of Present Illness:  Mr. Villareal is a 32-year-old male with ESKD on dialysis since May 2021, alcoholic cirrhosis, necrotizing pancreatitis, pancreatic insufficiency, CAD based on CTA findings, hypotension requiring midodrine on dialysis days, chronic pain, current tobacco use, ADHD, depression, and anxiety.          Kidney Disease Hx:        Prolonged admit ~May-October 2021 with EtOH cirrhosis, septic shock, necrotizing pancreatitis, decompensated liver disease, and RADHA requiring dialysis initiation. ESKD thought likely multifactorial with contrast exposure, sepsis/pancreatitis, bile nephropathy, and  HRS physiology. It appears he had baseline normal renal function when he first presented to [facility] in May 2021. He has remained anuric and dialysis dependent since.     Initially evaluated here virtually July 2022 with many concerns such as frailty, chronic nausea/vomiting, needing to complete chemical dependency, sort out social support plan, and follow up in pancreatitis clinic. Unclear, but appears he was lost to follow up until July 2023, and then had trouble reaching patient to schedule appt.     HD has been going OK, although he has had transportation issues resulting in missed runs lately. Just recently started using an AVF, catheter now out. Still has urine output. Needing midodrine 10-20 mg during HD runs.     Still using motorized wheelchair as main source of ambulation. Didn't bring it today as he reports missing his transportation that typically takes him with his chair. Ended up taking his sister's car and wheelchair wouldn't fit. Does occasionally use walker. Can walk about 500 feet with his walker, but unclear how often he does this. No exercise. Does ADLs but occasionally gets fatigued doing so.     Social support plan is still unclear.    Did see pancreatitis clinic but no follow up in ~ 1 year. Unclear if he continues to follow with hepatology, but sounds like he  hasn't been seen in some time- he thinks since his 2021 admit here, although looks like he was [place] [facility] [place] [place]. Was prescribed lactulose at that time, patient reports he is no longer taking this.    Previously evaluated for liver transplant Summer 2022, unclear outcome, but not currently listed.           Primary Nephrologist: Dr. Mancera       H/o Kidney Stones: No       H/o Recurrent/Frequent UTI: No         Diabetic Hx: None           Cardiac/Vascular Disease Risk Factors:        Known CAD: Yes       Known PAD/Caludication Symptoms: No       Known Heart Failure: No       Arrhythmia: No       Pulmonary Hypertension: No       Valvular Disease: No       Other: Hypotension on midodrine         Viral Serology Status       CMV IgG Antibody: Positive       EBV IgG Antibody: Negative         Volume Status/Weight:        BMI: Body mass index is 28.05 kg/m .         Functional Capacity/Frailty:        See above    Fatigue/Decreased Energy: [] No [x] Yes    Chest Pain or SOB with Exertion: [x] No [] Yes    Significant Weight Change: [x] No [] Yes    Nausea, Vomiting or Diarrhea: [] No [] Yes unclear   Fever, Sweats or Chills:  [x] No [] Yes    Leg Swelling [] No [] Yes      Allergy Testing Questions:   Medication that caused a reaction None   Antibiotics used that didn't give an allergic reaction?  Patient doesn't know    COVID Vaccination Up To Date: Not asked    Potential Living Kidney Donors: Not sure    Review of Systems:  A comprehensive review of systems was obtained and negative, except as noted in the HPI or PMH.    Past Medical History:   Medical record was reviewed and PMH was discussed with patient and noted below.  Past Medical History:   Diagnosis Date    ADHD 2008    Alcohol abuse, in remission     Alcoholic cirrhosis of liver with ascites (H)     Anemia in chronic kidney disease     Chronic pain     Current smoker     End stage renal disease (H)     Hypotension, unspecified hypotension type      Malnutrition (H24)     Metabolic acidosis     Necrotizing pancreatitis     Pancreatic insufficiency     Pericarditis     Recurrent pleural effusion        Past Social History:   Past Surgical History:   Procedure Laterality Date    AV FISTULA PLACEMENT, BRACHIOCEPHALIC Left 05/11/2022    [facility]    COMBINED ESOPHAGOSCOPY, GASTROSCOPY, DUODENOSCOPY (EGD), TISSUE APPOSI N/A 09/20/2021    Procedure: ESOPHAGOGASTRODUODENOSCOPY WITH SUTURE FISTULA CLOSURE, argon plasma coagulation;  Surgeon: Jose Quigley MD;  Location: [facility] OR    ENDOSCOPIC INSERTION TUBE GASTROSTOMY N/A 07/13/2021    Procedure: Upper endoscopy, INSERTION, PEG-J TUBE and Gastropexy;  Surgeon: Rayshawn Will MD;  Location: [facility]    ENDOSCOPIC INSERTION TUBE GASTROSTOMY  09/13/2021    Procedure: PEG/J PLACEMENT.;  Surgeon: Rayshawn Will MD;  Location: [facility]    ENDOSCOPIC ULTRASOUND UPPER GASTROINTESTINAL TRACT (GI) N/A 07/21/2021    Procedure: ENDOSCOPIC ULTRASOUND, ESOPHAGOSCOPY / UPPER GASTROINTESTINAL TRACT (GI) with cyst gastrostomy, dilation;  Surgeon: Guru Yecenia Chacko MD;  Location: [facility]    ENDOSCOPIC ULTRASOUND, ESOPHAGOSCOPY, GASTROSCOPY, DUODENOSCOPY (EGD), NECROSECTOMY N/A 08/11/2021    Procedure: ESOPHAGOGASTRODUODENOSCOPY (EGD) with necrosectomy, stent exchange.;  Surgeon: Amadou Beasley MD;  [facility]: [facility]    ESOPHAGOSCOPY, GASTROSCOPY, DUODENOSCOPY (EGD), COMBINED N/A 07/28/2021    Procedure: ESOPHAGOGASTRODUODENOSCOPY (EGD), gastrostomy apposition, Necrosectomy, stent exchange.;  Surgeon: Rayshawn Will MD;  [facility]: [facility]    ESOPHAGOSCOPY, GASTROSCOPY, DUODENOSCOPY (EGD), COMBINED N/A 08/06/2021    Procedure: ESOPHAGOGASTRODUODENOSCOPY (EGD) with necrosectomy, and stents exchanged;  Surgeon: Jose Quigley MD;  [facility]: [facility]    ESOPHAGOSCOPY, GASTROSCOPY, DUODENOSCOPY (EGD), COMBINED Left 09/13/2021    Procedure: ESOPHAGOGASTRODUODENOSCOPY (EGD), GASTROSTOMY TUBE REMOVAL,  "FISTULAR CLOSURE ENDOSCOPIC.;  Surgeon: Rayshawn Will MD;  [facility]: [facility]    IR ABSCESS TUBE CHANGE  07/14/2021    IR NG TUBE PLACEMENT REQ RAD & FLUORO  10/26/2021    IR PARACENTESIS  07/21/2021    IR SINOGRAM INJECTION DIAGNOSTIC  08/25/2021    IR THORACENTESIS  02/05/2022    ORTHOPEDIC SURGERY Right     fracture repair    REPLACE GASTROSTOMY TUBE, PERCUTANEOUS N/A 07/28/2021    Procedure: gastro-jejunostomy tube exchange;  Surgeon: Rayshawn Wlil MD;  Location: [facility] OR     Personal history of bleeding or anesthesia problems: No    Family History:  Family History   Problem Relation Age of Onset    Attention Deficit Disorder Mother     Alcoholism Mother     Cancer Mother     Alcoholism Father     Alcoholism Sister     Alcoholism Brother        Personal History:   Social History     Socioeconomic History    Marital status: Single     Spouse name: Not on file    Number of children: Not on file    Years of education: Not on file    Highest education level: Not on file   Occupational History    Not on file   Tobacco Use    Smoking status: Some Days     Packs/day: .25     Types: Cigarettes     Passive exposure: Current    Smokeless tobacco: Never    Tobacco comments:     8/24/2021 Patient did not want to discuss quitting but would like 4 mg lozenge to use during admission. Patient also accepted \"Quitting for Good\" workbook   Vaping Use    Vaping Use: Never used    Passive vaping exposure: Yes   Substance and Sexual Activity    Alcohol use: Not Currently     Comment: \"no alcohol for over 1 year ago\" per pt on 5/12/22    Drug use: No    Sexual activity: Not Currently     Partners: Female   Other Topics Concern    Not on file   Social History Narrative    Not on file     Social Determinants of Health     Financial Resource Strain: Low Risk  (10/10/2023)    Financial Resource Strain     Within the past 12 months, have you or your family members you live with been unable to get utilities (heat, electricity) " when it was really needed?: No   Recent Concern: Financial Resource Strain - Medium Risk (9/1/2023)    Overall Financial Resource Strain (CARDIA)     Difficulty of Paying Living Expenses: Somewhat hard   Food Insecurity: High Risk (10/10/2023)    Food Insecurity     Within the past 12 months, did you worry that your food would run out before you got money to buy more?: Yes     Within the past 12 months, did the food you bought just not last and you didn t have money to get more?: No   Transportation Needs: High Risk (10/10/2023)    Transportation Needs     Within the past 12 months, has lack of transportation kept you from medical appointments, getting your medicines, non-medical meetings or appointments, work, or from getting things that you need?: Yes   Physical Activity: Unknown (9/1/2023)    Exercise Vital Sign     Days of Exercise per Week: Patient refused     Minutes of Exercise per Session: Patient refused   Recent Concern: Physical Activity - Inactive (6/5/2023)    Exercise Vital Sign     Days of Exercise per Week: 0 days     Minutes of Exercise per Session: 0 min   Stress: Stress Concern Present (9/1/2023)    [employer] [place] [employer] - Occupational Stress Questionnaire     Feeling of Stress : Rather much   Social Connections: Unknown (9/1/2023)    Social Connection and Isolation Panel [NHANES]     Frequency of Communication with Friends and Family: Twice a week     Frequency of Social Gatherings with Friends and Family: Patient refused     Attends Cheondoism Services: Patient refused     Active Member of Clubs or Organizations: No     Attends Club or Organization Meetings: Never     Marital Status: Patient refused   Interpersonal Safety: Low Risk  (10/11/2023)    Interpersonal Safety     Do you feel physically and emotionally safe where you currently live?: Yes     Within the past 12 months, have you been hit, slapped, kicked or otherwise physically hurt by someone?: No     Within the past 12  months, have you been humiliated or emotionally abused in other ways by your partner or ex-partner?: No   Housing Stability: High Risk (10/10/2023)    Housing Stability     Do you have housing? : Yes     Are you worried about losing your housing?: Yes       Allergies:  No Known Allergies    Medications:  Current Outpatient Medications   Medication Sig    ALPRAZolam (XANAX) 0.5 MG tablet Take 1 tablet (0.5 mg) by mouth daily as needed for anxiety    [START ON 11/2/2023] amphetamine-dextroamphetamine (ADDERALL XR) 30 MG 24 hr capsule Take 1 capsule (30 mg) by mouth daily for 30 days    [START ON 12/1/2023] amphetamine-dextroamphetamine (ADDERALL XR) 30 MG 24 hr capsule Take 1 capsule (30 mg) by mouth daily for 30 days    [START ON 1/2/2025] amphetamine-dextroamphetamine (ADDERALL XR) 30 MG 24 hr capsule Take 1 capsule (30 mg) by mouth daily for 30 days    amphetamine-dextroamphetamine (ADDERALL XR) 30 MG 24 hr capsule Take 1 capsule (30 mg) by mouth daily    amphetamine-dextroamphetamine (ADDERALL) 20 MG tablet 2 tab at noon    [START ON 11/9/2023] amphetamine-dextroamphetamine (ADDERALL) 20 MG tablet 2 tab at noon    [START ON 12/7/2023] amphetamine-dextroamphetamine (ADDERALL) 20 MG tablet 2 tab tab at noon    amphetamine-dextroamphetamine (ADDERALL) 20 MG tablet 2 tab at noon.    amylase-lipase-protease (CREON 24) 78383-51160 units CPEP per EC capsule 1-2 capsules by Per Feeding Tube route every 4 hours    DULoxetine (CYMBALTA) 60 MG capsule Take 1 capsule (60 mg) by mouth daily    lidocaine (XYLOCAINE) 5 % external ointment Apply topically as needed for moderate pain (qid as needed) Apply to feet and knees qid for 10 days, then as needed    midodrine (PROAMATINE) 10 MG tablet One tab before Dialysis    midodrine (PROAMATINE) 5 MG tablet 2 tablets (10 mg) by Per Feeding Tube route 3 times daily    pantoprazole (PROTONIX) 40 MG EC tablet Take 1 tablet (40 mg) by mouth daily    pregabalin (LYRICA) 300 MG capsule One  tab bid ( increased )    prochlorperazine (COMPAZINE) 5 MG tablet Take 1 tablet (5 mg) by mouth every 8 hours as needed for nausea or vomiting    sevelamer carbonate, RENVELA, 0.8 GM PACK Packet 2 packets (1.6 g) by Oral or Feeding Tube route 3 times daily    silver nitrate (ARZOL) 75-25 % miscellaneous Apply topically daily as needed for wound care    tiZANidine (ZANAFLEX) 2 MG tablet Take 1 tablet (2 mg) by mouth 3 times daily as needed for muscle spasms (as needed)    torsemide (DEMADEX) 20 MG tablet 2 tab daily    vitamin (B COMPLEX) capsule Take 1 each by mouth    acetaminophen (TYLENOL) 325 MG/10.15ML solution 10.15 mLs (325 mg) by Oral or PEGJ tube route every 4 hours as needed for mild pain or fever    Cetaphil Moisturizing (CETAPHIL) external lotion Apply topically 2 times daily as needed for dry skin or irritation (Apply to chest)    diclofenac (VOLTAREN) 1 % topical gel Apply 2 gm to knees and feet qid as needed    folic acid (FOLVITE) 1 MG tablet 1 tablet (1 mg) by Oral or Feeding Tube route daily (Patient not taking: Reported on 7/20/2023)    Guar Gum (FIBER MODULAR, NUTRISOURCE FIBER,) packet 1 packet by Per Feeding Tube route 3 times daily (Patient not taking: Reported on 7/20/2023)    lactulose (CHRONULAC) 10 GM/15ML solution Take 20 g by mouth    naloxone (NARCAN) 4 MG/0.1ML nasal spray Spray 1 spray (4 mg) into one nostril alternating nostrils as needed for opioid reversal every 2-3 minutes until assistance arrives    nortriptyline (PAMELOR) 25 MG capsule Take 1 capsule (25 mg) by mouth At Bedtime    potassium chloride (KLOR-CON) 20 MEQ packet 20 mEq by Per Feeding Tube route daily    rOPINIRole (REQUIP) 0.25 MG tablet Take 1 tablet (0.25 mg) by mouth At Bedtime    sodium bicarbonate 325 MG tablet 1 tablet (325 mg) by Per Feeding Tube route every 4 hours (Patient not taking: Reported on 7/20/2023)    triamcinolone (KENALOG) 0.1 % external cream Apply bid for 3- 5 days, as needed (Patient not  "taking: Reported on 7/20/2023)     No current facility-administered medications for this visit.       Vitals:  /77   Pulse 118   Ht 1.702 m (5' 7\")   Wt 81.2 kg (179 lb 1.6 oz)   SpO2 95%   BMI 28.05 kg/m      Exam:  GENERAL APPEARANCE: alert and no distress  HENT: mouth without ulcers or lesions, very poor dentition  RESP: lungs clear to auscultation - no rales, rhonchi or wheezes  CV: regular rhythm, normal rate, no rub, no murmur  EDEMA: no LE edema bilaterally  ABDOMEN: soft, nondistended, nontender  MS: extremities normal - no gross deformities noted, no evidence of inflammation in joints, no muscle tenderness  SKIN: no rash    Results:   Recent Results (from the past 336 hour(s))   Treponema Abs w Reflex to RPR and Titer [[id number]]    Collection Time: 10/19/23  1:28 PM   Result Value Ref Range    Treponema Antibody Total Nonreactive Nonreactive   HIV Antigen Antibody Combo Pretransplant Cascade    Collection Time: 10/19/23  1:28 PM   Result Value Ref Range    HIV Antigen Antibody Combo Pretransplant Nonreactive Nonreactive   Hepatitis C antibody [[id number]]    Collection Time: 10/19/23  1:28 PM   Result Value Ref Range    Hepatitis C Antibody Nonreactive Nonreactive   Hepatitis B surface antigen [[id number]]    Collection Time: 10/19/23  1:28 PM   Result Value Ref Range    Hepatitis B Surface Antigen Nonreactive Nonreactive   Hepatitis B Surface Antibody [[id number]]    Collection Time: 10/19/23  1:28 PM   Result Value Ref Range    Hepatitis B Surface Antibody Instrument Value 10.43 <8.00 m[IU]/mL    Hepatitis B Surface Antibody Indeterminate    Hepatitis B core antibody [[id number]]    Collection Time: 10/19/23  1:28 PM   Result Value Ref Range    Hepatitis B Core Antibody Total Nonreactive Nonreactive   Partial thromboplastin time [LAB56]    Collection Time: 10/19/23  1:28 PM   Result Value Ref Range    aPTT 34 22 - 38 Seconds   Comprehensive metabolic panel [LAB17]    Collection Time: 10/19/23  1:28 PM "   Result Value Ref Range    Sodium 138 135 - 145 mmol/L    Potassium 3.1 (L) 3.4 - 5.3 mmol/L    Carbon Dioxide (CO2) 32 (H) 22 - 29 mmol/L    Anion Gap 13 7 - 15 mmol/L    Urea Nitrogen 25.1 (H) 6.0 - 20.0 mg/dL    Creatinine 3.10 (H) 0.67 - 1.17 mg/dL    GFR Estimate 26 (L) >60 mL/min/1.73m2    Calcium 10.5 (H) 8.6 - 10.0 mg/dL    Chloride 93 (L) 98 - 107 mmol/L    Glucose 160 (H) 70 - 99 mg/dL    Alkaline Phosphatase 121 40 - 129 U/L    AST 26 0 - 45 U/L    ALT 29 0 - 70 U/L    Protein Total 8.9 (H) 6.4 - 8.3 g/dL    Albumin 5.2 3.5 - 5.2 g/dL    Bilirubin Total 0.7 <=1.2 mg/dL   CBC with platelets and differential    Collection Time: 10/19/23  1:28 PM   Result Value Ref Range    WBC Count 6.4 4.0 - 11.0 10e3/uL    RBC Count 4.28 (L) 4.40 - 5.90 10e6/uL    Hemoglobin 13.3 13.3 - 17.7 g/dL    Hematocrit 37.5 (L) 40.0 - 53.0 %    MCV 88 78 - 100 fL    MCH 31.1 26.5 - 33.0 pg    MCHC 35.5 31.5 - 36.5 g/dL    RDW 12.9 10.0 - 15.0 %    Platelet Count 212 150 - 450 10e3/uL    % Neutrophils 79 %    % Lymphocytes 13 %    % Monocytes 4 %    Mids % (Monos, Eos, Basos)      % Eosinophils 3 %    % Basophils 1 %    % Immature Granulocytes 0 %    NRBCs per 100 WBC 0 <1 /100    Absolute Neutrophils 5.0 1.6 - 8.3 10e3/uL    Absolute Lymphocytes 0.8 0.8 - 5.3 10e3/uL    Absolute Monocytes 0.3 0.0 - 1.3 10e3/uL    Mids Abs (Monos, Eos, Basos)      Absolute Eosinophils 0.2 0.0 - 0.7 10e3/uL    Absolute Basophils 0.0 0.0 - 0.2 10e3/uL    Absolute Immature Granulocytes 0.0 <=0.4 10e3/uL    Absolute NRBCs 0.0 10e3/uL   Adult Type and Screen    Collection Time: 10/19/23  1:28 PM   Result Value Ref Range    ABO/RH(D) B POS     Antibody Screen Negative Negative    SPECIMEN EXPIRATION DATE [id number]    ABO and Rh    Collection Time: 10/19/23  1:31 PM   Result Value Ref Range    ABO/RH(D) B POS     SPECIMEN EXPIRATION DATE [id number]    EKG 12-lead, tracing only [EKG1]    Collection Time: 10/19/23  1:34 PM   Result Value Ref Range     Systolic Blood Pressure  mmHg    Diastolic Blood Pressure  mmHg    Ventricular Rate 96 BPM    Atrial Rate 96 BPM    NY Interval 160 ms    QRS Duration 94 ms     ms    QTc 457 ms    P Axis 31 degrees    R AXIS -24 degrees    T Axis 58 degrees    Interpretation ECG       Sinus rhythm  Normal ECG  When compared with ECG of 22-MAY-2023 03:56,  No significant change was found     UA with Microscopic reflex to Culture    Collection Time: 10/19/23  1:37 PM    Specimen: Urine, Midstream   Result Value Ref Range    Color Urine Yellow Colorless, Straw, Light Yellow, Yellow    Appearance Urine Clear Clear    Glucose Urine Negative Negative mg/dL    Bilirubin Urine Negative Negative    Ketones Urine Negative Negative mg/dL    Specific Gravity Urine 1.018 1.003 - 1.035    Blood Urine Negative Negative    pH Urine 7.5 (H) 5.0 - 7.0    Protein Albumin Urine 30 (A) Negative mg/dL    Urobilinogen Urine Normal Normal, 2.0 mg/dL    Nitrite Urine Negative Negative    Leukocyte Esterase Urine Negative Negative    Mucus Urine Present (A) None Seen /LPF    RBC Urine 1 <=2 /HPF    WBC Urine 1 <=5 /HPF    Squamous Epithelials Urine <1 <=1 /HPF    Hyaline Casts Urine 2 <=2 /LPF   Echocardiogram Complete    Collection Time: 10/19/23  2:18 PM   Result Value Ref Range    LVEF  60-65%        Review of prior external note(s) from - [facility]where information from [facility], and [facility]  reviewed  I spent a total of 107 minutes on the day of the visit.   Time spent by me doing chart review, history and exam, documentation and further activities per the note

## 2023-10-20 LAB
ATRIAL RATE - MUSE: 96 BPM
CARDIOLIPIN IGG SER IA-ACNC: 9.3 GPL-U/ML
CARDIOLIPIN IGG SER IA-ACNC: NEGATIVE
CARDIOLIPIN IGM SER IA-ACNC: 23 MPL-U/ML
CARDIOLIPIN IGM SER IA-ACNC: ABNORMAL
CMV IGG SERPL IA-ACNC: 0.53 U/ML
CMV IGG SERPL IA-ACNC: NORMAL
DIASTOLIC BLOOD PRESSURE - MUSE: NORMAL MMHG
DRVVT SCREEN RATIO: 0.96
EBV VCA IGG SER IA-ACNC: <10 U/ML
EBV VCA IGG SER IA-ACNC: NORMAL
GAMMA INTERFERON BACKGROUND BLD IA-ACNC: 0 IU/ML
INR PPP: 1.11 (ref 0.85–1.15)
INTERPRETATION ECG - MUSE: NORMAL
LA PPP-IMP: NEGATIVE
LUPUS INTERPRETATION: NORMAL
M TB IFN-G BLD-IMP: NEGATIVE
M TB IFN-G CD4+ BCKGRND COR BLD-ACNC: 8.68 IU/ML
MITOGEN IGNF BCKGRD COR BLD-ACNC: 0 IU/ML
MITOGEN IGNF BCKGRD COR BLD-ACNC: 0 IU/ML
P AXIS - MUSE: 31 DEGREES
PR INTERVAL - MUSE: 160 MS
PTT RATIO: 1.17
QRS DURATION - MUSE: 94 MS
QT - MUSE: 362 MS
QTC - MUSE: 457 MS
QUANTIFERON MITOGEN: 8.68 IU/ML
QUANTIFERON NIL TUBE: 0 IU/ML
QUANTIFERON TB1 TUBE: 0 IU/ML
QUANTIFERON TB2 TUBE: 0
R AXIS - MUSE: -24 DEGREES
SYSTOLIC BLOOD PRESSURE - MUSE: NORMAL MMHG
T AXIS - MUSE: 58 DEGREES
THROMBIN TIME: 16.9 SECONDS (ref 13–19)
VENTRICULAR RATE- MUSE: 96 BPM
VZV IGG SER QL IA: 2394 INDEX
VZV IGG SER QL IA: POSITIVE

## 2023-10-25 LAB
A*: NORMAL
A*LOCUS SEROLOGIC EQUIVALENT: 1
A*LOCUS: NORMAL
A*SEROLOGIC EQUIVALENT: 2
ABTEST METHOD: NORMAL
B*: NORMAL
B*LOCUS SEROLOGIC EQUIVALENT: 44
B*LOCUS: NORMAL
B*SEROLOGIC EQUIVALENT: 51
BW-1: NORMAL
C*: NORMAL
C*LOCUS SEROLOGIC EQUIVALENT: 5
C*LOCUS: NORMAL
C*SEROLOGIC EQUIVALENT: 15
DPA1*: NORMAL
DPB1*: NORMAL
DQA1*: NORMAL
DQA1*LOCUS: NORMAL
DQB1*: NORMAL
DQB1*LOCUS SEROLOGIC EQUIVALENT: 7
DQB1*LOCUS: NORMAL
DQB1*SEROLOGIC EQUIVALENT: 6
DRB1*: NORMAL
DRB1*LOCUS SEROLOGIC EQUIVALENT: 4
DRB1*LOCUS: NORMAL
DRB1*SEROLOGIC EQUIVALENT: 15
DRB4*LOCUS SEROLOGIC EQUIVALENT: 53
DRB4*LOCUS: NORMAL
DRB5*: NORMAL
DRB5*SEROLOGIC EQUIVALENT: 51
DRSSO TEST METHOD: NORMAL

## 2023-10-26 ENCOUNTER — TELEPHONE (OUTPATIENT)
Dept: TRANSPLANT | Facility: CLINIC | Age: 33
End: 2023-10-26
Payer: MEDICAID

## 2023-10-26 ENCOUNTER — PATIENT OUTREACH (OUTPATIENT)
Dept: CARE COORDINATION | Facility: CLINIC | Age: 33
End: 2023-10-26
Payer: MEDICAID

## 2023-10-26 NOTE — TELEPHONE ENCOUNTER
Called patient at this time to let him know that we did not get to his case on the Selection Committee agenda this week but we will discuss him next week. Left my direct line for call back if needed.

## 2023-10-26 NOTE — LETTER
[facility] CARE COORDINATION  [address] [address]   November 3, 2023        Dax Villareal  [address]  [place] [address]          Dear Rachel Verduzco is an updated Patient Centered Plan of Care for your continued enrollment in Care Coordination. Please let us know if you have additional questions, concerns, or goals that we can assist with.    As discussed today, I am sending this information:    Adult Mental Health/psychology referral placed 10/18/2023, please call   [phone]    [facility] Office of Medical Marijuana/   [phone]     Sincerely,    Paz Shirley, TISHAW, MSW Clinic   [facility]  Care [facility] and [facility]   [email or web address]  Office: [phone]  Employed by [facility]  Patient Centered Plan of Care  About Me:        Patient Name:  Dax Villareal    YOB: 1990  Age:         33 year old   [place] MRN:    [id number] Telephone Information:  Home Phone [phone]   Mobile [phone]   Home Phone Not on file.       Address:  [address]  [facility] [place] [facility] [place] [address] Email address:  [email or web address]      Emergency Contact(s)    Name Relationship Lgl Grd Work Phone Home Phone Mobile Phone   1. MARCIA CAMPOS Aunt    [phone]           Primary language:  English     needed? No   [place] Language Services:  [phone] op. 1  Other communication barriers:No data recorded  Preferred Method of Communication:   Joanne  Current living arrangement: I live alone    Mobility Status/ Medical Equipment: Independent w/Device        Health Maintenance  Health Maintenance Reviewed:   Health Maintenance Due   Topic Date Due    YEARLY PREVENTIVE VISIT  Never done    HF ACTION PLAN  Never done    MICROALBUMIN  Never done    PARATHYROID  Never done    ADVANCE CARE  PLANNING  Never done    HEPATITIS A IMMUNIZATION (1 of 2 - Risk 2-dose series) Never done    HEPATITIS B IMMUNIZATION (1 of 3 - Risk Dialysis 4-dose series) Never done    URINE DRUG SCREEN  12/09/2021    LIPID  06/28/2023        My Access Plan  Medical Emergency 911   Primary Clinic Line [facility] - [phone]   24 Hour Appointment Line [phone] or  [address] [place] [address] [phone]) (toll-free)   24 Hour Nurse Line [phone] (toll-free)   Preferred Urgent Care [facility], [phone]     Preferred Hospital [facility]  [phone]     Preferred Pharmacy [id number] - [place], [place] - [address] [place] [place] [address] AT [facility] [address]     Behavioral Health Crisis Line The National Suicide Prevention Lifeline at 1-563.321.8378 or Text/Call 630           My Care Team Members  Patient Care Team         Relationship Specialty Notifications Start End    Kole Gomez MD PCP - General Family Medicine Admissions 6/5/23     Phone: [phone] Fax: [phone]         [address] [place] [address]    Andrew Doherty MD MD   7/6/22     Fax: [phone]         Ramez Kirkland DO MD Nephrology  7/7/22     Phone: [phone] Fax: [phone]         [facility] [place] [address] [place] [facility] [address] [facility] [address]    Kole Gomez MD Assigned PCP   7/16/22     Phone: [phone] Fax: [phone]         [facility] [address]    Kun Pelayo Assigned Behavioral Health Provider   7/23/22     Rayshawn Will MD MD Gastroenterology  7/26/22     Phone: [phone] Fax: [phone]         [address] [place] [address]    Sharad Montero APRN CNP Nurse Practitioner Nephrology  7/26/22     Phone: [phone] Fax: [phone]         [address] [facility] [address]    Karishma Lopez MD Anesthesiologist Pain Medicine   "8/16/22     Phone: [phone] Pager: [phone] Fax: [phone]        [address] [facility] [address]    Rayshawn Will MD Assigned Gastroenterology Provider   10/8/22     Phone: [phone] Fax: [phone]         [address] [facility] [address] [facility] [address]    Paz Shirley BSW Lead Care Coordinator Primary Care - CC Admissions 8/31/23     Phone: [phone] Fax: [phone]        Cipriano Arteaga MD Assigned Surgical Provider   10/28/23     Phone: [phone] Fax: [phone]         [address] [facility] [address] [facility] [address]    Karina Kirkland CHW Community Health Worker Primary Care - CC Admissions 11/3/23     Phone: [phone] Fax: [phone]                    My Care Plans  Self Management and Treatment Plan    Care Plan  Care Plan: Mental Health       Problem: Needs to schedule psyhiatry appt                   Care Plan: Food resources       Problem: Difficulty obtaining food resources due to limited transportation                   Care Plan: Discussion with providers to ensure all are in sync       Problem: Concern that all providers may not be in sync with his care       Goal: I will discuss my current needs, ensure my care team is in sync with my care needs       Start Date: 9/1/2023 Expected End Date: 2/16/2024    This Visit's Progress: 40% Recent Progress: 10%    Priority: High    Note:     Barriers: concern all care team members are not in sync with his care needs   Strengths: will discuss above at pre transplant assessment visit in September, will call and schedule provider visit to update, has been in contact to coordinate is \"support care team\" for potential future transplant   Patient expressed understanding of goal:   Action steps to achieve this goal:yes  1. I will find time to call and schedule provider appt  2. I will arrange medical transport  3. I will discuss my current needs                               Action Plans on File: none                  "     Advance Care Plans/Directives:   Advanced Care Plan/Directives on file:   No    Discussed with patient/caregiver(s): No data recorded           My Medical and Care Information  Problem List   Patient Active Problem List   Diagnosis    Necrotizing pancreatitis    ESRD (end stage renal disease) on dialysis (H)    Cirrhosis of liver (H)    Muscle weakness    Contracture of joint of foot, unspecified laterality    DIMITRI (generalized anxiety disorder)    Alcohol abuse, in remission    Chronic pain    Pancreatic insufficiency    Anemia in chronic kidney disease    Acquired bilateral pes cavus          Care Coordination Start Date: 8/30/2023   Frequency of Care Coordination: Monthly    Form Last Updated: 11/03/2023

## 2023-10-26 NOTE — PROGRESS NOTES
Clinic Care Coordination Contact  [facility]    Clinical Data: Care Coordinator Outreach    Outreach Documentation Number of Outreach Attempt   10/26/2023   4:58 PM 1       VM is full per      Plan:  Care Coordinator will try to reach patient again in 1-2 business days.    DARNELL Heart, MSW   [facility]  Care Coordination  [facility]  [phone]  10/26/2023 4:59 PM

## 2023-10-30 LAB
PROTOCOL CUTOFF: NORMAL
SA 1 CELL: NORMAL
SA 1 TEST METHOD: NORMAL
SA 2 CELL: NORMAL
SA 2 TEST METHOD: NORMAL
SA1 HI RISK ABY: NORMAL
SA1 MOD RISK ABY: NORMAL
SA2 HI RISK ABY: NORMAL
SA2 MOD RISK ABY: NORMAL
UNACCEPTABLE ANTIGENS: NORMAL
UNOS CPRA: 12
ZZZSA 1  COMMENTS: NORMAL
ZZZSA 2 COMMENTS: NORMAL

## 2023-11-01 ENCOUNTER — COMMITTEE REVIEW (OUTPATIENT)
Dept: TRANSPLANT | Facility: CLINIC | Age: 33
End: 2023-11-01
Payer: MEDICARE

## 2023-11-01 NOTE — COMMITTEE REVIEW
Abdominal Committee Review Note     Evaluation Date: 7/12/2022  Committee Review Date: 11/1/2023    Organ being evaluated for: Kidney    Transplant Phase: Evaluation  Transplant Status: Active    Transplant Coordinator: Marnie Chaudhari  Transplant Surgeon:       Referring Physician: Referred Self    Primary Diagnosis:   Secondary Diagnosis:     Committee Review Members:  Nephrology Yusuf Mott MD   Nutrition Luisa Choudhary RD   Pharmacist Zoltan Alfredo, [facility]    - Clinical Mame Cristine Chisholm, [facility], Chaparrita Blunt, [facility]   Transplant LINDA CEDENO, RN, Earlene Hernandez, RN, Shaina Mora, RN, Chaparrita Manjarrez, RN, Ute Ríos, NP, Varsha Ellison, ROB, Marnie Elder, RN, Debra Bender, ROB, Ailyn Cole, RN   Transplant Surgery Mally Cerna MD, MD       Transplant Eligibility: Irreversible chronic kidney disease treated w/dialysis or expected need for dialysis    Committee Review Decision: Needs Re-presentation    Relative Contraindications: Other, support person, c/f HE    Absolute Contraindications: Other    Committee Chair Mally Cerna MD verbally attested to the committee's decision.    Committee Discussion Details: Reviewed pt's medical status and evaluation results to date.  Patient is determined to be a fair candidate for kidney transplant. Dr. Cerna and Dr. Mott recommend that the patient re-establish care with a hepatologist prior to being able to make a decision. Documented interactions with social work and nephrology raised concern for potential hepatic encephalopathy. The committee would like formal clearance from hepatology for kidney alone.     Will not be listed at this time as they are on dialysis. They will be rediscussed once the above are complete for consideration of active status..

## 2023-11-02 NOTE — PROVIDER NOTIFICATION
09/22/21 1700   Call Information   Date of Call 09/22/21   Time of Call 1732   Name of person requesting the team Bernie   Title of person requesting team RN   RRT Arrival time 1735   Reason for call   Type of RRT Adult   Primary reason for call Cardiovascular   Cardiovascular SBP less than 90   Was patient transferred from the ED, ICU, or PACU within last 24 hours prior to RRT call? No   SBAR   Situation SBP 85   Background 30 year old male with hx of alcohol use disorder admitted on 6/28/2021 after recent prolonged admission at [facility] in [place] who has severe acute alcoholic hepatitis c/b HE, ESRD requiring HD, and malnutrition in the setting of acute necrotizing pancreatitis, s/p PEG-J placement on 7/13. He arrived with acute respiratory failure and septic shock that have resolved, and his secondary bacterial peritonitis has been appropriately treated. He requires continued management for ESRD, hepatic encephalopathy, infected pancreatic pseudocyst, nutritional support via PEG-J, and persistent right pleural effusion.   Notable History/Conditions End-Stage disease;Organ failure   Assessment A/OX3, tachycardic, hypotensive, O2 sats above 92% on 4 LPM NC, Jaundice, returned early from dialysis d/t nausea, now C/O ABD pain,.   Interventions Meds;IV fluids;Labs  (Albumin)   Patient Outcome   Patient Outcome Stabilized on unit   RRT Team   Attending/Primary/Covering Physician Daniela 3   Date Attending Physician notified 09/22/21   Time Attending Physician notified 1735   Physician(s) Susannah Riggins   Lead RN Shelby Smith   Post RRT Intervention Assessment   Post RRT Assessment Stable/Improved   Date Follow Up Done 09/22/21   Time Follow Up Done 2040      Name &  verified. Pt informed of results and recommendations. Pt aware that 3 hr gtt requires fasting and appointment. Phone number to [employer] provided.

## 2023-11-03 ENCOUNTER — TELEPHONE (OUTPATIENT)
Dept: TRANSPLANT | Facility: CLINIC | Age: 33
End: 2023-11-03
Payer: MEDICARE

## 2023-11-03 DIAGNOSIS — N18.6 END STAGE RENAL DISEASE (H): Primary | ICD-10-CM

## 2023-11-03 DIAGNOSIS — N18.9 CHRONIC RENAL FAILURE: ICD-10-CM

## 2023-11-03 DIAGNOSIS — Z87.19 HISTORY OF LIVER DISEASE: ICD-10-CM

## 2023-11-03 DIAGNOSIS — Z76.82 ORGAN TRANSPLANT CANDIDATE: ICD-10-CM

## 2023-11-03 DIAGNOSIS — Z01.818 PRE-TRANSPLANT EVALUATION FOR KIDNEY TRANSPLANT: ICD-10-CM

## 2023-11-03 ASSESSMENT — ACTIVITIES OF DAILY LIVING (ADL): DEPENDENT_IADLS:: CLEANING;COOKING;LAUNDRY;SHOPPING;MEAL PREPARATION;TRANSPORTATION

## 2023-11-03 NOTE — TELEPHONE ENCOUNTER
Called Dax to discuss the results of the Selection Committee this week.  Let him know that he will need to re establish care with a hepatologist and they need to clear him for a kidney alone transplant.  He also needs to establish a support person and a support plan. We agreed to start there and we will continue to check off the additional items after seeing hepatology. Will place orders for hepatology now. He expressed understanding of the above.

## 2023-11-03 NOTE — LETTER
11/03/23        Dax Villareal  [address]  [place] [address]        Dear Dax,    It was a pleasure to see you recently for consideration of kidney transplantation. Your pre-transplant evaluation results were reviewed at our Multidisciplinary Selection Committee on 11/01/2023. The committee is requesting the following items are completed before determining your candidacy:    We are asking you to see a liver doctor (hepatologist).  This is because you have a history of liver and pancreatic disease and we need to make sure your liver is healthy enough to undergo kidney transplant. Someone from our scheduling department will contact you to get this scheduled.   We are asking you to see a neuropsychologist.  Transplant is very complex and we need to make sure we understand how you learn and understand best so that we can help you and give you the best possible care. Someone from our scheduling department will contact you to get this scheduled.   Once these are complete, we can move forward with the rest of your evaluation:   - We are asking you to have Pulmonary Function Testing.  This is because of your smoking history. We want to make sure that your lungs are healthy enough to undergo surgery and anesthesia.   -We are are asking you to see a cardiologist for a risk assessment.  This is so that we can ensure that your heart is healthy enough to withstand a large surgery such as transplant.   -We are asking that you establish with a physical therapist to improve your functional status.   -We want to ensure that you have adequate social support post transplant. Once you have come up with a support plan, we will meet with our  so that we can all be on the same page in this regard.     For any questions, please contact the Transplant Office at [phone].      Sincerely,  Marnie Chaudhari RN   Pre-Kidney Transplant Coordinator  Direct line: [phone]     Solid Organ Transplant  M  [facility], [facility]

## 2023-11-14 DIAGNOSIS — K85.91 NECROTIZING PANCREATITIS: ICD-10-CM

## 2023-11-14 DIAGNOSIS — F41.1 GAD (GENERALIZED ANXIETY DISORDER): ICD-10-CM

## 2023-11-14 RX ORDER — ALPRAZOLAM 0.5 MG
0.5 TABLET ORAL DAILY PRN
Qty: 30 TABLET | Refills: 2 | Status: CANCELLED | OUTPATIENT
Start: 2023-11-14

## 2023-11-14 NOTE — TELEPHONE ENCOUNTER
Medications refills:  Is he seen a psychiatry, he was seen one on 10/18/2023 ?  He need to get a refills thru his Psychiatry.    thanks         [place] Prescription Monitoring Program, ( ) referenced. No indication of misuse, or abuse.

## 2023-11-15 NOTE — TELEPHONE ENCOUNTER
Tc reached out relayed message needs to contact s Psychiatry. To get medication     Patient verbalized he understood         Azalea Bell    [facility]

## 2023-11-17 ENCOUNTER — TELEPHONE (OUTPATIENT)
Dept: FAMILY MEDICINE | Facility: CLINIC | Age: 33
End: 2023-11-17
Payer: MEDICARE

## 2023-11-17 NOTE — TELEPHONE ENCOUNTER
General Call    Contacts         Type Contact Phone/Fax    11/17/2023 04:46 PM CST Phone (Incoming) Dax Villareal (Self) [phone] (M)          Reason for Call:  medical marijuana renewal/email set up    What are your questions or concerns:  patient is requesting to re-enroll in medical marijuana. States that when he saw Akil Cox, the patient had decided not enroll in the program/use the email to become certified, and is wanting to enroll/get a new email to set it up/ Writer told patient that he may need to be seen to be re-evaluated.     Please advise     Date of last appointment with provider: 7/20/23    Could we send this information to you in [facility] [place] [facility] or would you prefer to receive a phone call?:   Patient would prefer a phone call   Okay to leave a detailed message?: Yes at Home number on file [phone] (home)

## 2023-11-20 ENCOUNTER — TELEPHONE (OUTPATIENT)
Dept: TRANSPLANT | Facility: CLINIC | Age: 33
End: 2023-11-20
Payer: MEDICARE

## 2023-11-20 NOTE — TELEPHONE ENCOUNTER
I received a voice message from Dax. He reported that he wanted to keep me updated on his progress with a few items and wanted to make sure that he is getting all of his appointments completed. This writer met with patient in clinic for a liver evaluation on 6/28/2022, but ultimately, his liver evaluation is closed.   He asked for a call back/email or [email or web address] message    Plan: This writer is not following Dax for any transplant evaluation. Message routed patient's kidney coordinator.     RONNA Guzmán, [facility]  Liver Transplant   [facility]  Phone: [phone]  Pager: [phone]

## 2023-11-21 ENCOUNTER — OFFICE VISIT (OUTPATIENT)
Dept: FAMILY MEDICINE | Facility: CLINIC | Age: 33
End: 2023-11-21
Payer: MEDICARE

## 2023-11-21 VITALS
TEMPERATURE: 97.9 F | DIASTOLIC BLOOD PRESSURE: 66 MMHG | HEIGHT: 67 IN | OXYGEN SATURATION: 96 % | SYSTOLIC BLOOD PRESSURE: 108 MMHG | RESPIRATION RATE: 26 BRPM | WEIGHT: 176.37 LBS | HEART RATE: 111 BPM | BODY MASS INDEX: 27.68 KG/M2

## 2023-11-21 DIAGNOSIS — M21.6X1 ACQUIRED BILATERAL PES CAVUS: ICD-10-CM

## 2023-11-21 DIAGNOSIS — F90.9 ATTENTION DEFICIT HYPERACTIVITY DISORDER (ADHD), UNSPECIFIED ADHD TYPE: ICD-10-CM

## 2023-11-21 DIAGNOSIS — N18.6 ESRD (END STAGE RENAL DISEASE) ON DIALYSIS (H): Primary | ICD-10-CM

## 2023-11-21 DIAGNOSIS — Z99.2 ESRD (END STAGE RENAL DISEASE) ON DIALYSIS (H): Primary | ICD-10-CM

## 2023-11-21 DIAGNOSIS — M21.6X2 ACQUIRED BILATERAL PES CAVUS: ICD-10-CM

## 2023-11-21 PROCEDURE — 99213 OFFICE O/P EST LOW 20 MIN: CPT | Performed by: FAMILY MEDICINE

## 2023-11-21 RX ORDER — DEXTROAMPHETAMINE SACCHARATE, AMPHETAMINE ASPARTATE, DEXTROAMPHETAMINE SULFATE AND AMPHETAMINE SULFATE 5; 5; 5; 5 MG/1; MG/1; MG/1; MG/1
TABLET ORAL
Qty: 60 TABLET | Refills: 0 | Status: SHIPPED | OUTPATIENT
Start: 2023-11-21 | End: 2024-01-30

## 2023-11-21 RX ORDER — DEXTROAMPHETAMINE SACCHARATE, AMPHETAMINE ASPARTATE MONOHYDRATE, DEXTROAMPHETAMINE SULFATE AND AMPHETAMINE SULFATE 7.5; 7.5; 7.5; 7.5 MG/1; MG/1; MG/1; MG/1
30 CAPSULE, EXTENDED RELEASE ORAL DAILY
Qty: 30 CAPSULE | Refills: 0 | Status: SHIPPED | OUTPATIENT
Start: 2023-11-21 | End: 2024-01-30

## 2023-11-21 RX ORDER — DEXTROAMPHETAMINE SACCHARATE, AMPHETAMINE ASPARTATE MONOHYDRATE, DEXTROAMPHETAMINE SULFATE AND AMPHETAMINE SULFATE 7.5; 7.5; 7.5; 7.5 MG/1; MG/1; MG/1; MG/1
30 CAPSULE, EXTENDED RELEASE ORAL DAILY
Qty: 30 CAPSULE | Refills: 0 | Status: SHIPPED | OUTPATIENT
Start: 2023-12-01 | End: 2024-01-25

## 2023-11-21 RX ORDER — DEXTROAMPHETAMINE SACCHARATE, AMPHETAMINE ASPARTATE, DEXTROAMPHETAMINE SULFATE AND AMPHETAMINE SULFATE 5; 5; 5; 5 MG/1; MG/1; MG/1; MG/1
TABLET ORAL
Qty: 60 TABLET | Refills: 0 | Status: SHIPPED | OUTPATIENT
Start: 2023-12-07 | End: 2024-01-25

## 2023-11-21 ASSESSMENT — PAIN SCALES - GENERAL: PAINLEVEL: NO PAIN (0)

## 2023-11-21 ASSESSMENT — PATIENT HEALTH QUESTIONNAIRE - PHQ9
SUM OF ALL RESPONSES TO PHQ QUESTIONS 1-9: 5
SUM OF ALL RESPONSES TO PHQ QUESTIONS 1-9: 5
10. IF YOU CHECKED OFF ANY PROBLEMS, HOW DIFFICULT HAVE THESE PROBLEMS MADE IT FOR YOU TO DO YOUR WORK, TAKE CARE OF THINGS AT HOME, OR GET ALONG WITH OTHER PEOPLE: EXTREMELY DIFFICULT

## 2023-11-21 NOTE — PROGRESS NOTES
Assessment & Plan     ESRD (end stage renal disease) on dialysis (H)      Attention deficit hyperactivity disorder (ADHD), unspecified ADHD type    - amphetamine-dextroamphetamine (ADDERALL) 20 MG tablet; 2 tab at noon  - amphetamine-dextroamphetamine (ADDERALL) 20 MG tablet; 2 tab tab at noon  - amphetamine-dextroamphetamine (ADDERALL XR) 30 MG 24 hr capsule; Take 1 capsule (30 mg) by mouth daily  - amphetamine-dextroamphetamine (ADDERALL XR) 30 MG 24 hr capsule; Take 1 capsule (30 mg) by mouth daily    Acquired bilateral pes cavus    Patient comes in today to have a form filled.  Currently, he lives in alone, apartment.  However, he is not sure if he needs to move to a different location.    Form for housing assistance was filled and given to the patient.    He does have history of end-stage kidney disease, on hemodialysis.  History of acquired bilateral pes cavus.    History of ADHD medication refilled.    David Verduzco is a 33 year old, presenting for the following health issues:  Forms      11/21/2023    12:56 PM   Additional Quest[employer] i  Formns   Roomed by Yomaira DIA CMA   Accompanied by Self         11/21/2023    12:56 PM   Patient Reported Additional Medications   Patient reports taking the following new medications None       History of Present Illness       Reason for visit:  Professional statement of need county paperwork    He eats 0-1 servings of fruits and vegetables daily.He consumes 3 sweetened beverage(s) daily.He exercises with enough effort to increase his heart rate 30 to 60 minutes per day.  He exercises with enough effort to increase his heart rate 7 days per week.   He is taking medications regularly.         Review of Systems   Constitutional, HEENT, cardiovascular, pulmonary, gi and gu systems are negative, except as otherwise noted.      Objective    /66 (BP Location: Right arm, Patient Position: Chair, Cuff Size: Adult Large)   Pulse 111   Temp 97.9  F (36.6  C) (Oral)    "Resp 26   Ht 1.702 m (5' 7\")   Wt 80 kg (176 lb 5.9 oz)   SpO2 96%   BMI 27.62 kg/m    Body mass index is 27.62 kg/m .  Physical Exam   GENERAL: healthy, alert and no distress  PSYCH: mentation appears normal, affect normal/bright    Orders Placed This Encounter   Procedures    HEART FAILURE ACTION PLAN            Kole Gomez MD              "

## 2023-11-21 NOTE — TELEPHONE ENCOUNTER
Paperwork resubmitted.    He should be getting confirmation email in the next couple weeks from the Department of Health to the [email or web address] address.    Akil Cox, CNP

## 2023-11-21 NOTE — COMMUNITY RESOURCES LIST (ENGLISH)
11/21/2023   [facility] [employer]  N/A  For questions about this resource list or additional care needs, please contact your primary care clinic or care manager.  Phone: [phone]   Email: N/A   Address: [address] [place] [address]   Hours: N/A        Financial Stability       Rent and mortgage payment assistance  1  Neighborhood Assistance [employer] [place] [employer] - Home Save Program Distance: 5.14 miles      Phone/Virtual   [address] [place] [address]  Language: English, Chilean  Hours: Mon - Fri 9:00 AM - 5:00 PM  Fees: Free   Phone: (663) 667-2956 Email: services@[employer] Website: https://www.mobME Solutions     2  [employer] Distance: 9.45 miles      Phone/Virtual   [address] [place] [address]  Language: English  Hours: Mon - Fri 9:00 AM - 5:00 PM  Fees: Free   Phone: (999) 517-7698 Email: vraepbilz5etsgrzu@[employer]Binder Website: https://brdpcjpyg2ajsfvxm.org/          Food and Nutrition       Food pantry  3  [facility] Food Shelf Distance: 0.69 miles      Pickup   [address] [place] [address]  Language: English, Chilean  Hours: Mon - Fri Appt. Only  Fees: Free   Phone: (351) 140-2017 Email: foodshelf@[employer] Website: http://www.[employer]we[employer].org     4  [facility] - Food Distribution Distance: 1.7 miles      Pickup   [address] [place] [address]  Language: English  Hours: Fri 5:00 PM - 7:00 PM  Fees: Free   Phone: (126) 490-6085 Email: office@[employer].org Website: http://www.Proxama.org     SNAP application assistance  5  [place] -   Family Wellness (AIFW) Distance: 4.75 miles      In-Person, Phone/Virtual   [address] [place] [address]  Language: Chinese, Mongolian, English, Gujarati, Vilma, Upper sorbian, Welsh, Latvian, Bengali, Telugu  Hours: Mon - Wed 9:00 AM - 5:00 PM , Thu 12:00 PM - 6:00 PM , Fri 9:00 AM - 5:00 PM , Sun 10:30 AM - 2:00  PM Appt. Only  Fees: Free   Phone: (326) 655-5544 Email: info@[employer].Hubskip Website: https://www.[employer].org/     6  [employer]  Immigrant Opportunity Center ([place]) Distance: 6.08 miles      In-Person, Phone/Virtual   [address] [place] [address]  Language: English, Hmong  Hours: Mon - Fri 8:30 AM - 4:30 PM  Fees: Free   Phone: (189) 567-7966 Ext.1 Email: info@[employer] Website: https://www.My-Apps/     Soup kitchen or free meals  7  [facility] - Family Table Meal Distance: 2.46 miles      Pick   [address] [place] [address]  Language: English  Hours: Sat 11:30 AM - 12:30 PM  Fees: Free   Phone: (323) 442-8977 Email: richard@[facility] CenterBURLESQUICEOUS Website: http://www.[facility].Hubskip/     8  [facility] and [facility] Community Supper Distance: 2.51 miles      In-Person   [address] [place] [address]  Language: English  Hours: Wed 5:15 PM - 6:00 PM  Fees: Free   Phone: (851) 221-7390 Email: info@[employer].Hubskip Website: http://www.[employer].org/          Transportation       Free or low-cost transportation  9  [facility] Distance: 9.91 miles      In-Person   [address] [place] [address]  Language: English  Hours: Mon - Wed 9:30 AM - 12:00 PM , Mon - Wed 1:00 PM - 2:00 PM Appt. Only  Fees: Free   Phone: (965) 886-9818 Email: info@saintolaf.Hubskip Website: http://www.saintolaf.org/     10  [facility] Bus Loop - Free or low-cost transportation Distance: 10.12 miles      In-Person   [address] [place] [address]  Language: English  Hours: Mon - Fri 9:00 AM - 5:00 PM  Fees: Free   Phone: (597) 907-7571 Email: info@46elks Website: https://www.46elks/     Transportation to medical appointments  11  [facility]  [place] Family Wellness (AIFW) Distance: 4.75 miles      In-Person   [address] [place] [address]  Language: Malay, Macedonian, English, Gujarati, Vilma, Kyrgyz, Yakut,  Arabic, Greenlandic, Latvian  Hours: Mon - Wed 9:00 AM - 5:00 PM , Thu 12:00 PM - 6:00 PM , Fri 9:00 AM - 5:00 PM , Sun 10:30 AM - 2:00 PM Appt. Only  Fees: Free   Phone: (351) 187-3057 Email: info@[employer] Website: https://www.[employer]/     12  [employer] Ride Distance: 9.06 miles      In-Person   [address] [address]  Language: English  Hours: Mon - Thu 6:00 AM - 6:00 PM , Fri 6:00 AM - 5:00 PM  Fees: Insurance, Self Pay   Phone: (272) 668-5861 Email: office@[employer] Website: https://www.[employer]/          Important Numbers & Websites       Emergency Services   911  [facility] Services   311  Poison Control   (361) 105-1091  Suicide Prevention Lifeline   (833) 925-5194 (TALK)  Child Abuse Hotline   (257) 319-6448 (4-A-Child)  Sexual Assault Hotline   (357) 867-1736 (HOPE)  National Runaway Safeline   (559) 260-9766 (RUNAWAY)  All-Options Talkline   (283) 855-8570  Substance Abuse Referral   (845) 952-8759 (HELP)

## 2023-11-22 ENCOUNTER — VIRTUAL VISIT (OUTPATIENT)
Dept: PSYCHIATRY | Facility: CLINIC | Age: 33
End: 2023-11-22
Payer: MEDICARE

## 2023-11-22 DIAGNOSIS — F32.1 CURRENT MODERATE EPISODE OF MAJOR DEPRESSIVE DISORDER, UNSPECIFIED WHETHER RECURRENT (H): Primary | ICD-10-CM

## 2023-11-22 DIAGNOSIS — F41.1 GAD (GENERALIZED ANXIETY DISORDER): ICD-10-CM

## 2023-11-22 PROCEDURE — 99214 OFFICE O/P EST MOD 30 MIN: CPT | Mod: VID | Performed by: NURSE PRACTITIONER

## 2023-11-22 RX ORDER — HYDROXYZINE PAMOATE 25 MG/1
25 CAPSULE ORAL 3 TIMES DAILY PRN
Qty: 90 CAPSULE | Refills: 2 | Status: SHIPPED | OUTPATIENT
Start: 2023-11-22 | End: 2024-01-30

## 2023-11-22 RX ORDER — DULOXETIN HYDROCHLORIDE 30 MG/1
CAPSULE, DELAYED RELEASE ORAL
Qty: 60 CAPSULE | Refills: 3 | Status: SHIPPED | OUTPATIENT
Start: 2023-11-22 | End: 2024-01-25

## 2023-11-22 ASSESSMENT — PAIN SCALES - GENERAL: PAINLEVEL: MODERATE PAIN (4)

## 2023-11-22 NOTE — PATIENT INSTRUCTIONS
"[facility]  What to Expect  Here's what to expect in the Panel Psych[facility] Program.   About the program  You'll be meeting with a psychiatric doctor to check your mental health. A psychiatric doctor helps you deal with troubling thoughts and feelings by giving you medicine. They'll make sure you know the plan for your care. You may see them for a long time. When you're feeling better, they may refer you back to seeing your family doctor.   If you have any questions, we'll be glad to talk to you.  About visits  Be open  At your visits, please talk openly about your problems. It may feel hard, but it's the best way for us to help you.  Cancelling visits  If you can't come to your visit, please call us right away at [phone]. If you don't cancel at least 24 hours (1 full day) before your visit, that's \"late cancellation.\"  Not showing up for your visits  Being very late is the same as not showing up. You'll be a \"no show\" if:  You're more than 15 minutes late for a 30-minute (half hour) visit.  You're more than 30 minutes late for a 60-minute (full hour) visit.  If you cancel late or don't show up 2 times within 6 months, we may end your care.  Getting help between visits  If you need help between visits, you can call us Monday to Friday from 8 a.m. to 4:30 p.m. at [phone].  Emergency care  Call 911 or go to the nearest emergency department if your life or someone else's life is in danger.  Call 988 anytime to reach the national Suicide and Crisis hotline.  Medicine refills  To refill your medicine, call your pharmacy. You can also call [facility]'s [facility] at [phone], Monday to Friday, 8 a.m. to 4:30 p.m. It can take 1 to 3 business days to get a refill.   Forms, letters, and tests  You may have papers to fill out, like FMLA, short-term disability, and workability. We can help you with these forms at your visits, but you must have an appointment. You may need more " than 1 visit for this, to be in an intensive therapy program, or both.  Before we can give you medicine for ADHD, we may refer you to get tested for it or confirm it another way.  We may not be able to give you an emotional support animal letter.  We don't do mental health checks ordered by the court.   We don't do mental health testing, but we can refer you to get tested.   Thank you for choosing us for your care.  For informational purposes only. Not to replace the advice of your health care provider. Copyright   2022 [facility]. All rights reserved. [place] [id number] - 12/22      After Visit Summary   Continue medications as prescribed  Have your pharmacy contact us for a refill if you are running low on medications (We may ask you to come into clinic to get a refill from the nurse  No Alcohol or drug use  No driving if sedated  Call the clinic with any questions or concerns   Reach out for help if you feel like hurting yourself or others ([facility] [phone]: [address], [address] or [facility] Suicide Hotline   [phone] , call 911 or go to nearest Emergency room     Crisis Resources:    Present to the Emergency Department as needed or call after hours crisis line at [phone] or [phone].   [place] Crisis Text Line: Text [place] to 778252.  Suicide LifeLine Chat: suicidepreventionlifeline.org/chat/.  National Suicide Prevention Lifeline: 777.714.5633 (TTY: 384.100.9619). Call anytime for help.  (www.suicidepreventionlifeline.org)  National [place] on Mental Illness (www.baljinder.org): 253.625.6161 or 395-374-3036.  Mental Health Association (www.mentalhealth.org): 149.113.7271 or 442-919-5766.       Follow up as directed, for your appointments, per your After Visit Summary Form.

## 2023-11-22 NOTE — NURSING NOTE
Is the patient currently in the state of [place]? YES    Visit mode:VIDEO    If the visit is dropped, the patient can be reconnected by: VIDEO VISIT: Text to cell phone:   Telephone Information:   Mobile [phone]       Will anyone else be joining the visit? NO  (If patient encounters technical issues they should call [phone])    How would you like to obtain your AVS? MyChart    Are changes needed to the allergy or medication list? Yes Pt has flagged several medication for removal      Reason for visit: RECHECK    Valentine MULLEN

## 2023-11-22 NOTE — PROGRESS NOTES
"  The patient has been notified of following:      \"This virtual  visit will be conducted via a call between you and your physician/provider. We have found that certain health care needs can be provided without the need for a physical exam.  This service lets us provide the care you need virtually/via video   If a prescription is necessary we can send it directly to your pharmacy.  If lab work is needed we can place an order for that and you can then stop by our lab to have the test done at a later time.     Virtual/Video visits are billed at different rates depending on your insurance coverage.Some insurers they may be billed the same as an in-person visit.  Please reach out to your insurance provider with any questions.    Patient has given verbal consent for virtual  visit : Yes      Ho w would you like to obtain your AVS? Mail a copy  If the video visit is dropped, the invitation should be resent by: Send to e-mail at: [email or web address]  Will anyone else be joining your video visit? No          Psychiatric  Out- Patient  Follow Up Progress Note  Date of visit:11/22/2023           Discussion of Care and Treatment Recommendations:   This is a 33 year old male with a past medical history of end-stage renal disease, on hemodialysis 3 times per week.  History of generalized anxiety disorder, history of chronic pain.    .      Last visit 10/18/2023.  Recommendation at last visit .     1.Depression : Ambulatory referral to psychology- individual psychotherapy    2.  Patient is not currently interested in medication management and for depression and anxiety at this time.  He will return to his primary care for continued management of his ADHD medication  Patient and I reviewed diagnosis and treatment plan and patient agrees with following recommendations:  Ongoing education given regarding diagnostic and treatment options with adequate verbalization of understanding.  Plan   1.Depression : Ambulatory referral to " psychology- individual psychotherapy    2.  Depression-start duloxetine 30 mg daily for 7 days then increase dose to 60 mg  Patient and I reviewed diagnosis and treatment plan and patient agrees with following recommendations:  Ongoing education given regarding diagnostic and treatment options with adequate verbalization of understandi         DIagnoses:     Generalized anxiety disorder  Major depressive disorder untreated  History of ADHD    Patient Active Problem List   Diagnosis    Necrotizing pancreatitis    ESRD (end stage renal disease) on dialysis (H)    Cirrhosis of liver (H)    Muscle weakness    Contracture of joint of foot, unspecified laterality    DIMITRI (generalized anxiety disorder)    Alcohol abuse, in remission    Chronic pain    Pancreatic insufficiency    Anemia in chronic kidney disease    Acquired bilateral pes cavus             Chief Complaint / Subjective:    Chief complaint: Depression and Anxiety     History of Present Illness:   Per patient statement : Depression and anxiety symptoms are continuing to persist.  He change his mind about not wanting to treat depression and anxiety with antidepressant.  He is not interested in starting duloxetine but he had discussed during her last visit.  He has been on duloxetine in the past and tolerated the medication okay.  Will restart duloxetine today at 30 mg for 7 days and then increase this to 60 mg daily.  Medications benefits and side effects provided extensively discussed.  Patient endorsed understanding and consents to trial.  Patient will also follow-up with psychotherapy for referral management, last visit.  Patient offers no other concerns today.    Mental Status Examination:   Appearance: Well groomed, good eye contact   Orientation: Patient alert and oriented to person, place, time, and situation  Reliability:  Patient appears to be an adequate historian.    Behavior: cooperative   Speech: Speech is spontaneous and coherent, with a normal  "rate, rhythm and tone.    Language:There are no difficulties with expressive or receptive language as observed throughout the interview.    Mood: Described as \"ok\".    Affect: congruent   Judgement: Able to make basic decision regarding safety.  Insight: Good awareness of physical and mental health conditions and aware of needs around care for these.  Gait and station: unable to assess  Thought process: Logical   Thought content: No evidence of delusions or paranoia.    Hallucinations : No evidence of any hallucination  Thought content: No evidence of delusions or paranoia.   Suicidal /Homical Ideations:  No thoughts of self harm or suicide. No thoughts of harming others.  Associations: Connected  Fund of knowledge: Average  Attention / Concentration: Able to remain focused during the interview with minimal distractibility or need for redirection.  Short Term Memory: Grossly intact as evidence by client recalling themes and ideas discussed.  Long Term Memory: Intact  Motor Status: unable to asse    Drug/treatment history and current pattern of use:   Drug/treatment history and current pattern of use:   Drug of choice: Alcohol   Last alcohol use : 2021  History of alcohol abuse and history of cirrhosis and pancreatis and kidney failure   Nicotine -1/2 pack a day   Cannabis- Denies   Denies use of all other mood altering substances         Medication changes: See Above   Medication adherence: compliant  Medication side effects: absent  Information about medications: Side effects, benefits and alternative treatments discussed and patient agrees .    Psychotherapy: Supportive therapy day-to-day living    Education: Diet, exercise, abstinence from drugs and alcohol, patient will not drive if sedated and medications or  under influence of any substance    Lab Results:   Personally reviewed and discussed with the patient    Lab Results   Component Value Date    WBC 6.4 10/19/2023    HGB 13.3 10/19/2023    HCT 37.5 (L) " 10/19/2023     10/19/2023    CHOL 162 06/28/2022    TRIG 70 06/28/2022    HDL 62 06/28/2022    ALT 29 10/19/2023    AST 26 10/19/2023     10/19/2023    BUN 25.1 (H) 10/19/2023    CO2 32 (H) 10/19/2023    TSH 1.59 06/28/2022    PSA 0.49 06/28/2022    INR 1.11 10/19/2023       Vital signs:  There were no vitals taken for this visit.  Telemedicine visit-no vital signs completed  Allergies: Patient has no known allergies.         Medications:     Current Outpatient Medications   Medication    lidocaine (XYLOCAINE) 5 % external ointment    lipase-protease-amylase (CREON 24) 37080-76487-039396 units CPEP per EC capsule    midodrine (PROAMATINE) 10 MG tablet    midodrine (PROAMATINE) 5 MG tablet    pregabalin (LYRICA) 300 MG capsule    prochlorperazine (COMPAZINE) 5 MG tablet    sevelamer carbonate, RENVELA, 0.8 GM PACK Packet    silver nitrate (ARZOL) 75-25 % miscellaneous    tiZANidine (ZANAFLEX) 2 MG tablet    torsemide (DEMADEX) 20 MG tablet    vitamin (B COMPLEX) capsule    acetaminophen (TYLENOL) 325 MG/10.15ML solution    ALPRAZolam (XANAX) 0.5 MG tablet    amphetamine-dextroamphetamine (ADDERALL XR) 30 MG 24 hr capsule    [START ON 12/1/2023] amphetamine-dextroamphetamine (ADDERALL XR) 30 MG 24 hr capsule    [START ON 1/2/2025] amphetamine-dextroamphetamine (ADDERALL XR) 30 MG 24 hr capsule    amphetamine-dextroamphetamine (ADDERALL XR) 30 MG 24 hr capsule    amphetamine-dextroamphetamine (ADDERALL) 20 MG tablet    [START ON 12/7/2023] amphetamine-dextroamphetamine (ADDERALL) 20 MG tablet    amphetamine-dextroamphetamine (ADDERALL) 20 MG tablet    amphetamine-dextroamphetamine (ADDERALL) 20 MG tablet    Cetaphil Moisturizing (CETAPHIL) external lotion    diclofenac (VOLTAREN) 1 % topical gel    DULoxetine (CYMBALTA) 60 MG capsule    folic acid (FOLVITE) 1 MG tablet    Guar Gum (FIBER MODULAR, NUTRISOURCE FIBER,) packet    lactulose (CHRONULAC) 10 GM/15ML solution    naloxone (NARCAN) 4 MG/0.1ML nasal  spray    nortriptyline (PAMELOR) 25 MG capsule    pantoprazole (PROTONIX) 40 MG EC tablet    potassium chloride (KLOR-CON) 20 MEQ packet    rOPINIRole (REQUIP) 0.25 MG tablet    sodium bicarbonate 325 MG tablet    triamcinolone (KENALOG) 0.1 % external cream     No current facility-administered medications for this visit.               Medication adherence: Reviewed risk/benefits of medication , Patient able to verbalize understanding of side effects and Patient verbally consents to taking medications      PSYCHOEDUCATION:  Medication side effects and alternatives reviewed. Health promotion activities recommended and reviewed today. All questions addressed. Education and counseling completed regarding risks and benefits of medications and psychotherapy options.  Consent provided by patient/guardian  Call the psychiatric nurse line with medication questions or concerns at [phone].  [employer] may be used to communicate with your provider, but this is not intended to be used for emergencies.  SEROTONIN SYNDROME:  Discussed risks of Serotonin syndrome (ie, serotonin toxicity) which is a potentially life-threatening condition associated with increased serotonergic activity in the central nervous system (CNS). It is seen with therapeutic medication use, inadvertent interactions between drugs, and intentional self-poisoning. Serotonin syndrome may involve a spectrum of clinical findings, which often include mental status changes, autonomic hyperactivity, and neuromuscular abnormalities.    STIMULANT THERAPY: Side effects discussed including but not limited to cardiac (including HTN, tachycardia, sudden death), motor/tic, appetite/growth, mood lability and sleep disruption. This is a controlled substance with risk for abuse, need to keep in a safe keep place and cannot replace lost scripts  HARM REDUCTION:  Discussions regarding effects of mood altering substances, alcohol and cannabis, on mood and that approach is harm  reduction, will continue to prescribe meds as they work to cut back use.    SAFETY:  We all care about your loved one's safety. To reduce the risk of self-harm, remove access to all:  Firearms, Medicines (both prescribed and over-the-counter), Knives and other sharp objects, Ropes and like materials, and Alcohol  SLEEP HYGIENE: establish a sleep routine, limit screen time 1 hour prior to bed, use bed for sleep only, take sleep/medications on time (including sleepy time tea, trazadone or herbal treatments such as melatonin), aroma therapy, limit caffeine/sugar, yoga, guided imagery, stretch, meditation, limit naps to 20 minutes, make a temperature change in the room, white noise, be mindful of slowing down breathing, take a warm bath/shower, frequently wash sheets, and journaling.   Medlineplus.gov is information for patients.  It is run by the "1,2,3 [employer]" Library of Medicine and it contains information about all disorders, diseases and all medications.              Review of Systems:      ROS:    Subjective Data Only- Tele-Health Visit    10 point ROS was negative except for the items listed in HPI.      Crisis Resources:    Present to the Emergency Department as needed or call after hours crisis line at [phone] or [phone].   [place] Crisis Text Line: Text [place] to 751194.  Suicide LifeLine Chat: suicidepreventionRidleyline.org/chat/.  National Suicide Prevention Lifeline: 775.497.1099 (TTY: 988.774.3254). Call anytime for help.  (www.suicidepreventionlifeline.org)  National [place] on Mental Illness (www.baljinder.org): 960.224.5200 or 186-300-3001.  Mental Health Association (www.mentalhealth.org): 330.131.4720 or 136-613-0078.      Coordination of Care:   More than 30 minutes spent on this visit  with more than 50% of time spent on coordination of care including: Educating patient about diagnosis, prognosis, side effects and benefits of medications, diet, exercise.  Time also spent providing supportive therapy  regarding above issues.    Video-Visit Details    Type of service:  Video Visit    Originating Location (pt. Location): Home    Distant Location (provider location): Providers Remote Office     Platform used for Video Visit: WalkMe      This note was created using a dictation system. All typing errors or contextual distortion is unintentional and software inherent.  Start Time : 0930  End time :  1000

## 2023-11-29 DIAGNOSIS — G62.9 PERIPHERAL POLYNEUROPATHY: ICD-10-CM

## 2023-11-29 RX ORDER — PREGABALIN 300 MG/1
CAPSULE ORAL
Qty: 180 CAPSULE | Refills: 3 | Status: SHIPPED | OUTPATIENT
Start: 2023-11-29 | End: 2024-06-17

## 2023-12-01 ENCOUNTER — TELEPHONE (OUTPATIENT)
Dept: FAMILY MEDICINE | Facility: CLINIC | Age: 33
End: 2023-12-01
Payer: MEDICARE

## 2023-12-01 NOTE — TELEPHONE ENCOUNTER
Patient reports cut/open area on Left ear, appears it is on the inside of the ear but not inside the canal. This has been going on for a month. It does not bleed but blots blood on occasion. It sounded like he was picking the area open. Denies: fever but reports mild pain, only at the site of the open area. Does not believe this is infected but cannot see area. Believes he injured the area with his fingernail. He cannot tell how large it is.     RN educated on avoiding picking the area/placing things inside the ear canal    Declines being on blood thinners.     He stated he was going to dyalisis and wanted to have one of the nurses to take a photo of the area.     RN educated on getting EVISIT with photos and to put as many details on there as possible. RN encouraged him to place the EVISIT with any provider for fastest response. He agreed to do this.     RN educated him that he could use urgent care if he does not want to do EVISIT or to call us back as we do have 24 hour triage nurses with any changes.     Loly Ortega RN on 12/1/2023 at 12:33 PM

## 2023-12-05 ENCOUNTER — VIRTUAL VISIT (OUTPATIENT)
Dept: PSYCHOLOGY | Facility: CLINIC | Age: 33
End: 2023-12-05
Attending: NURSE PRACTITIONER
Payer: MEDICARE

## 2023-12-05 DIAGNOSIS — F43.23 ADJUSTMENT DISORDER WITH MIXED ANXIETY AND DEPRESSED MOOD: Primary | ICD-10-CM

## 2023-12-05 PROCEDURE — 90834 PSYTX W PT 45 MINUTES: CPT | Mod: VID

## 2023-12-05 ASSESSMENT — COLUMBIA-SUICIDE SEVERITY RATING SCALE - C-SSRS
ATTEMPT LIFETIME: NO
6. HAVE YOU EVER DONE ANYTHING, STARTED TO DO ANYTHING, OR PREPARED TO DO ANYTHING TO END YOUR LIFE?: NO
1. IN THE PAST MONTH, HAVE YOU WISHED YOU WERE DEAD OR WISHED YOU COULD GO TO SLEEP AND NOT WAKE UP?: NO
2. HAVE YOU ACTUALLY HAD ANY THOUGHTS OF KILLING YOURSELF?: NO
TOTAL  NUMBER OF ABORTED OR SELF INTERRUPTED ATTEMPTS LIFETIME: NO
TOTAL  NUMBER OF INTERRUPTED ATTEMPTS LIFETIME: NO
1. HAVE YOU WISHED YOU WERE DEAD OR WISHED YOU COULD GO TO SLEEP AND NOT WAKE UP?: YES

## 2023-12-05 ASSESSMENT — ANXIETY QUESTIONNAIRES
7. FEELING AFRAID AS IF SOMETHING AWFUL MIGHT HAPPEN: NOT AT ALL
5. BEING SO RESTLESS THAT IT IS HARD TO SIT STILL: SEVERAL DAYS
3. WORRYING TOO MUCH ABOUT DIFFERENT THINGS: SEVERAL DAYS
GAD7 TOTAL SCORE: 6
4. TROUBLE RELAXING: MORE THAN HALF THE DAYS
1. FEELING NERVOUS, ANXIOUS, OR ON EDGE: SEVERAL DAYS
6. BECOMING EASILY ANNOYED OR IRRITABLE: NOT AT ALL
2. NOT BEING ABLE TO STOP OR CONTROL WORRYING: SEVERAL DAYS
GAD7 TOTAL SCORE: 6
IF YOU CHECKED OFF ANY PROBLEMS ON THIS QUESTIONNAIRE, HOW DIFFICULT HAVE THESE PROBLEMS MADE IT FOR YOU TO DO YOUR WORK, TAKE CARE OF THINGS AT HOME, OR GET ALONG WITH OTHER PEOPLE: SOMEWHAT DIFFICULT

## 2023-12-05 NOTE — PROGRESS NOTES
[facility]   Mental Health & Addiction Services     Progress Note - Initial Visit    Patient  Name:  Dax Villareal Date: 12/5/2023         Service Type: Individual     Visit Start Time: 1:30pm  Visit End Time: 2:10pm    Visit #:  1    Attendees: Client    Service Modality:  Video Visit:      Provider verified identity through the following two step process.    Patient provided:  Patient was verified at admission/transfer    Telemedicine Visit: The patient's condition can be safely assessed and treated via synchronous audio and visual telemedicine encounter.      Reason for Telemedicine Visit: Services only offered telehealth    Originating Site (Patient Location): Patient's home    Distant Site (Provider Location): Provider Remote Setting- Home Office    Consent:  The patient/guardian has verbally consented to: the potential risks and benefits of telemedicine (video visit) versus in person care; bill my insurance or make self-payment for services provided; and responsibility for payment of non-covered services.     Patient would like the video invitation sent by:  My Chart    Mode of Communication:  Video Conference via Amwell    Distant Location (Provider):  Off-site    As the provider I attest to compliance with applicable laws and regulations related to telemedicine.       DATA:   Interactive Complexity: No   Crisis: No     Presenting Concerns/  Current Stressors:   Patient presents with stressors of anxiety due to medical complexity. Patient is awaiting organ transplant.       ASSESSMENT:  Mental Status Assessment:  Appearance:   Appropriate   Eye Contact:   Fair   Psychomotor Behavior: Normal   Attitude:   Pleasant  Orientation:   All  Speech   Rate / Production: Normal/ Responsive   Volume:  Normal   Mood:    Anxious  Depressed   Affect:    Appropriate   Thought Content:  Clear   Thought Form:  Coherent  Tangential   Insight:    Poor     Assessments completed prior to this visit:  The  following assessments were completed by patient for this visit:  PHQ9:       8/31/2022     2:51 PM 5/25/2023     9:20 AM 6/12/2023     1:15 PM 7/20/2023     7:16 AM 10/10/2023     2:42 PM 11/21/2023    12:45 PM 12/5/2023    12:50 PM   PHQ-9 SCORE   PHQ-9 Total Score MyChart  7 (Mild depression) 8 (Mild depression)  11 (Moderate depression) 5 (Mild depression) 8 (Mild depression)   PHQ-9 Total Score 13 7 8 6 11 5 8     GAD7:       7/21/2022     2:06 PM 8/23/2022     9:03 AM 10/11/2022    10:00 AM 4/17/2023     2:26 PM 12/5/2023    12:51 PM   DIMITRI-7 SCORE   Total Score 7 (mild anxiety) 19 (severe anxiety) 13 (moderate anxiety) 21 (severe anxiety) 6 (mild anxiety)   Total Score 7 19 13 21 6     CAGE-AID:       12/5/2023    12:53 PM   CAGE-AID Total Score   Total Score 1   Total Score MyChart 1 (A total score of 2 or greater is considered clinically significant)     PROMIS 10-Global Health (only subscores and total score):       12/5/2023    12:52 PM   PROMIS-10 Scores Only   Global Mental Health Score 8    8   Global Physical Health Score 9    9   PROMIS TOTAL - SUBSCORES 17    17     Gilchrist Suicide Severity Rating Scale (Lifetime/Recent)      7/22/2022     8:22 AM 12/5/2023     1:53 PM   Gilchrist Suicide Severity Rating (Lifetime/Recent)   Q1 Wish to be Dead (Lifetime) Y Y   1. Wish to be Dead (Past 1 Month) N N   Q2 Non-Specific Active Suicidal Thoughts (Lifetime) N N   Most Severe Ideation Rating (Lifetime) 2    Frequency (Lifetime) 1    Duration (Lifetime) 1    Controllability (Lifetime) 1    Deterrents (Lifetime) 2    Reasons for Ideation (Lifetime) 4    Actual Attempt (Lifetime) N N   Has subject engaged in non-suicidal self-injurious behavior? (Lifetime) N N   Interrupted Attempts (Lifetime) N N   Aborted or Self-Interrupted Attempt (Lifetime) N N   Preparatory Acts or Behavior (Lifetime) N N   Calculated C-SSRS Risk Score (Lifetime/Recent) No Risk Indicated No Risk Indicated         Safety Issues and Plan for  Safety and Risk Management:  Patient denies current fears or concerns for personal safety.  Patient denies current or recent suicidal ideation or behaviors.  Patient denies current or recent homicidal ideation or behaviors.  Patient denies current or recent self injurious behavior or ideation.  Patient denies other safety concerns.  Recommended that patient call 911 or go to the local ED should there be a change in any of these risk factors.  Patient reports there are no firearms in the house.     Diagnostic Criteria:  Adjustment Disorder  A. The development of emotional or behavioral symptoms in response to an identifiable stressor(s) occurring within 3 months of the onset of the stressor(s)  B. These symptoms or behaviors are clinically significant, as evidenced by one or both of the following:  C. The stress-related disturbance does not meet criteria for another disorder & is not not an exacerbation of another mental disorder  D. The symptoms do not represent normal bereavement  E. Once the stressor or its consequences have terminated, the symptoms do not persist for more than an additional 6 months       * Adjustment Disorder with Mixed Anxiety and Depressed Mood: The predominant manifestation is a combination of depression and anxiety      DSM5 Diagnoses: (Sustained by DSM5 Criteria Listed Above)  Diagnoses: Adjustment Disorders  309.28 (F43.23) With mixed anxiety and depressed mood  Psychosocial & Contextual Factors: Complex medical hx; awaiting transplant; recovering from alcohol.   Intervention:   Psychodynamic- Patient processed internal experiences   Collateral Reports Completed:  Not Applicable      PLAN: (Homework, other):  1. Provider will continue Diagnostic Assessment.  Patient was given the following to do until next session:  Patient to engage in self care coping skill.     2. Provider recommended the following referrals: Patient to be referred to provider who can take patient's insurance coverage.       3.  Suicide Risk and Safety Concerns were assessed for Dax Villareal.    Patient meets the following risk assessment and triage: Patient denied any current/recent/lifetime history of suicidal ideation and/or behaviors.  No safety plan indicated at this time.       RONNA Brannon  December 5, 2023   Service Performed and Documented by [facility]-   Note reviewed and clinical supervision by RONNA Flowers [facility] 12/12/2023

## 2023-12-12 ENCOUNTER — PATIENT OUTREACH (OUTPATIENT)
Dept: CARE COORDINATION | Facility: CLINIC | Age: 33
End: 2023-12-12
Payer: MEDICARE

## 2023-12-12 NOTE — PROGRESS NOTES
Clinic Care Coordination Contact  [facility]/Voicemail    Clinical Data: Care Coordinator Outreach    Outreach Documentation Number of Outreach Attempt   10/26/2023   4:58 PM 1   12/12/2023  12:37 PM 1       Left message on patient's voicemail with call back information and requested return call.    Plan:  Care Coordinator will try to reach patient again in 3-5 business days.    Paz Shirley, DARNELL, MSW   [facility]  Care Coordination  [place]  [phone]  12/12/2023 12:38 PM

## 2023-12-24 ENCOUNTER — TRANSFERRED RECORDS (OUTPATIENT)
Dept: HEALTH INFORMATION MANAGEMENT | Facility: CLINIC | Age: 33
End: 2023-12-24

## 2023-12-29 DIAGNOSIS — F90.9 ATTENTION DEFICIT HYPERACTIVITY DISORDER (ADHD), UNSPECIFIED ADHD TYPE: ICD-10-CM

## 2023-12-29 NOTE — TELEPHONE ENCOUNTER
Pt calling to have these medications filled. RN informed pt that the Adderall XR is scheduled to be filled 12/30/23.      Disp Refills Start End ABIGAIL    amphetamine-dextroamphetamine (ADDERALL XR) 30 MG 24 hr capsule 30 capsule 0 1/2/2025 2/1/2025 No   Sig - Route: Take 1 capsule (30 mg) by mouth daily for 30 days - Oral   Patient not taking: Reported on 11/22/2023   Sent to pharmacy as: Amphetamine-Dextroamphet ER 30 MG Oral Capsule Extended Release 24 Hour (Adderall XR)   Class: E-Prescribe   Earliest Fill Date: 12/30/2024   Order: [id number]   E-Prescribing Status: Receipt confirmed by pharmacy (10/11/2023 10:49 AM CDT)     Pt is looking for Adderall 20mg to be refilled in hopes of picking this up 12/30/23 as his brother can drive him to fill this script.     Melyssa Jean RN on 12/29/2023 at 12:27 PM

## 2024-01-02 RX ORDER — DEXTROAMPHETAMINE SACCHARATE, AMPHETAMINE ASPARTATE, DEXTROAMPHETAMINE SULFATE AND AMPHETAMINE SULFATE 5; 5; 5; 5 MG/1; MG/1; MG/1; MG/1
TABLET ORAL
Qty: 60 TABLET | Refills: 0 | Status: SHIPPED | OUTPATIENT
Start: 2024-01-02 | End: 2024-01-30

## 2024-01-11 ENCOUNTER — TELEPHONE (OUTPATIENT)
Dept: FAMILY MEDICINE | Facility: CLINIC | Age: 34
End: 2024-01-11

## 2024-01-11 ENCOUNTER — TELEPHONE (OUTPATIENT)
Dept: TRANSPLANT | Facility: CLINIC | Age: 34
End: 2024-01-11
Payer: MEDICARE

## 2024-01-11 NOTE — TELEPHONE ENCOUNTER
I received a call from Dax. He wanted to follow up after the holiday season. I reviewed patient's chart and this writer is no longer following, as patient is not in evaluation for liver transplant.     Plan: I forwarded note to kidney coordinator and

## 2024-01-11 NOTE — TELEPHONE ENCOUNTER
Routing to     Patient requesting call back      Patient called with multiple concerns and questions that he wanted Dr. Gomez to be aware of before his 1/23/2024 appointment for annual wellness check    Patient is requesting another referral for PT at the [place] office and to be fitted for a foot shoe /boot for bilateral pes cavus.    He had molds done before but never picked up the shoes due to financial concerns.    He has many open cases with the county over lack of PCA services and care that was substandard from previous PCA's.     has tried to reach patients twice but was unsuccessful so we will send message asking for her to connect with patient to review available financial and community resources available to Dax.    Christine M Klisch, RN

## 2024-01-12 ENCOUNTER — TELEPHONE (OUTPATIENT)
Dept: TRANSPLANT | Facility: CLINIC | Age: 34
End: 2024-01-12
Payer: MEDICARE

## 2024-01-12 NOTE — TELEPHONE ENCOUNTER
Returning call to patient at this time.  He is wondering if he is on the right track for transplant. Let him know that we do not know if he is a candidate until he is seen by neuropsych and GI.  These are scheduled for February.  I will bring him to the Selection Committee for recommendations after these are complete. Unsure what he understood. He was talking about PCA services and insurance etc with concerns. Will ask  to call and discuss those issues.

## 2024-01-18 ENCOUNTER — TELEPHONE (OUTPATIENT)
Dept: TRANSPLANT | Facility: CLINIC | Age: 34
End: 2024-01-18
Payer: MEDICARE

## 2024-01-18 ENCOUNTER — PATIENT OUTREACH (OUTPATIENT)
Dept: CARE COORDINATION | Facility: CLINIC | Age: 34
End: 2024-01-18
Payer: MEDICARE

## 2024-01-18 NOTE — PROGRESS NOTES
"Clinic SW Care Coordination Contact  Follow Up Progress Note , Clinic RN asked for outreach      Assessment: Spoke with patient.  He had many concerns, Shared he is [facility], the [facility] and the [facility].  Stated he was discharged from a TCU with no services. Having great difficulty finding a PCA. Pt shared he is to receive a call today from [facility], not sure what how they will help him. Also getting anew [facility] worker,.  He was told his insurance is not clear. [facility] working on that.  Today Dax said he feels \"things are on the right track. \" He does have a Psychiatry appt 1/25/24 , Psychology appt 1/26/24, and a Neuro psych appt 2/6/24.  All of these need to happen prior to a transplant.    Patient was calm, Appreciated active listening . He agreed to another call from Lead SW CC , Paz Shirley, next week, to provide consistent SW services.  He requests a call next Tuesday 1/23 .  He has dialysis M,W,F, .   Patient stated, he felt comfortable with this plan.  He wants to see what he finds out from the calls he is expecting today.     Care Gaps:    Health Maintenance Due   Topic Date Due    MICROALBUMIN  Never done    PARATHYROID  Never done    ADVANCE CARE PLANNING  Never done    MEDICARE ANNUAL WELLNESS VISIT  Never done    HEPATITIS A IMMUNIZATION (1 of 2 - Risk 2-dose series) Never done    HEPATITIS B IMMUNIZATION (1 of 3 - Risk Dialysis 4-dose series) Never done    URINE DRUG SCREEN  12/09/2021    LIPID  06/28/2023    COVID-19 Vaccine (5 - 2023-24 season) 12/06/2023    BMP  01/19/2024       Postponed to next call     Care Plans  Care Plan: Mental Health       Problem: Needs to schedule psychiatry  appt       Priority: High Onset Date: 12/20/2023    Note:      I will schedule Psychiatry and psychology appts  Barriers:scheduling  Strengths: able to make calls  Patient expressed understanding of goal: good  Action steps to achieve this goal:  1. I will  schedule a psychiatry and " "psychology appt ( 1/25/24 and 1/26/24)  2. I will keep appts  3. I will communicate with  CC                     Care Plan: Food resources       Problem: Difficulty obtaining food resources due to limited transportation                   Care Plan: Discussion with providers to ensure all are in sync       Problem: Concern that all providers may not be in sync with his care       Goal: I will discuss my current needs, ensure my care team is in sync with my care needs       Start Date: 9/1/2023 Expected End Date: 2/16/2024    This Visit's Progress: 40% Recent Progress: 10%    Priority: High    Note:     Barriers: concern all care team members are not in sync with his care needs   Strengths: will discuss above at pre transplant assessment visit in September, will call and schedule provider visit to update, has been in contact to coordinate is \"support care team\" for potential future transplant   Patient expressed understanding of goal:   Action steps to achieve this goal:yes  1. I will find time to call and schedule provider appt  2. I will arrange medical transport  3. I will discuss my current needs                             Care Plan: General       Problem: HP GENERAL PROBLEM needs PCA       Priority: High Onset Date: 1/18/2024    Note:     I need a PCA , frustration over agency  Barriers: availability  Strengths: capable of making calls   Patient expressed understanding of goal: yes  Action steps to achieve this goal:  1. I will continue to work with the [facility]  2. I will provide necessary paperwork   3. I will communicate regularly with  CC        Goal: General Goal - please update text                             Intervention/Education provided during outreach: Introduction , active listening,  plan made for next call     Outreach Frequency: monthly, more frequently as needed      Plan: Patient will accept calls planned for today, from agencies he is working with. And share info with Lead SW " CC next week  Care Coordinator will follow up  next week as planned.      DARNELL Chatman / RONNA Fan  [facility]   Care Coordination  [facility]  1/18/2024 9:53 AM

## 2024-01-21 ENCOUNTER — HEALTH MAINTENANCE LETTER (OUTPATIENT)
Age: 34
End: 2024-01-21

## 2024-01-25 ENCOUNTER — PATIENT OUTREACH (OUTPATIENT)
Dept: CARE COORDINATION | Facility: CLINIC | Age: 34
End: 2024-01-25
Payer: MEDICARE

## 2024-01-25 ENCOUNTER — TELEPHONE (OUTPATIENT)
Dept: PSYCHIATRY | Facility: CLINIC | Age: 34
End: 2024-01-25
Payer: MEDICARE

## 2024-01-25 ENCOUNTER — VIRTUAL VISIT (OUTPATIENT)
Dept: PSYCHIATRY | Facility: CLINIC | Age: 34
End: 2024-01-25
Payer: MEDICARE

## 2024-01-25 DIAGNOSIS — F41.1 GAD (GENERALIZED ANXIETY DISORDER): ICD-10-CM

## 2024-01-25 DIAGNOSIS — F32.1 CURRENT MODERATE EPISODE OF MAJOR DEPRESSIVE DISORDER, UNSPECIFIED WHETHER RECURRENT (H): ICD-10-CM

## 2024-01-25 PROCEDURE — 99214 OFFICE O/P EST MOD 30 MIN: CPT | Mod: 95 | Performed by: NURSE PRACTITIONER

## 2024-01-25 RX ORDER — DULOXETIN HYDROCHLORIDE 60 MG/1
CAPSULE, DELAYED RELEASE ORAL
Qty: 90 CAPSULE | Refills: 0 | Status: SHIPPED | OUTPATIENT
Start: 2024-01-25 | End: 2024-01-25

## 2024-01-25 RX ORDER — HYDROXYZINE HYDROCHLORIDE 25 MG/1
25 TABLET, FILM COATED ORAL 3 TIMES DAILY PRN
Qty: 90 TABLET | Refills: 0 | Status: SHIPPED | OUTPATIENT
Start: 2024-01-25 | End: 2024-03-15

## 2024-01-25 ASSESSMENT — PAIN SCALES - GENERAL: PAINLEVEL: MODERATE PAIN (4)

## 2024-01-25 NOTE — TELEPHONE ENCOUNTER
1) RN reviewed telephone encounter re:  Name of the pharmacy and phone number for the current request: [id number] - [place], [place] - [address] [place] [facility] [place] [address]   [phone]      Name of the medication requested:   duloxetine (CYMBALTA) 60 MG capsule     Other request: Current instructions state 1 tablet for 7 days then increase to two tablets, however the medication is in capsules, pharmacy believes that provider mean to say 1 cap daily. Please consult.      Phone number to reach patient:  Home number on file [phone] (home)    2) RN reviewed the script that was sent to the pharmacy  DULoxetine (CYMBALTA) 60 MG capsule 90 capsule 0 1/25/2024 -- No  Sig: Take  I tablet - 30 mg daily for 7 days then increase to 2 tablet - 60 mg    3) RN reviewed treatment for today indicated  Plan   1.Depression : Ambulatory referral to psychology- individual psychotherapy    2.  Depression- Continue Duloxetine 60 mg daily   Anxiety : Hydroxyzine 25 mg TID PRN  Patient and I reviewed diagnosis and treatment plan and patient agrees with following recommendations:  Ongoing education given regarding diagnostic and treatment options with adequate verbalization of understandi    4) RN forwarding to Herminia Valdivia NP to provide a new prescription for duloxetine 60 mg - Take 1 capsule daily.      Lorena De Los Santos RN on 1/25/2024 at 3:18 PM

## 2024-01-25 NOTE — LETTER
[facility] CARE COORDINATION    [address] [address]   January 25, 2024        Dax Villareal  [address]  [place] [address]        Dear Rachel Verduzco is an updated Patient Centered Plan of Care for your continued enrollment in Care Coordination. Please let us know if you have additional questions, concerns, or goals that we can assist with.    Sincerely,    Paz Shirley, TISHAW, MSW Clinic   [facility]   [email or web address]  Office: [phone]  Employed by [employer] [facility] [place], [place]  Patient Centered Plan of Care  About Me:        Patient Name:  Dax Villareal    YOB: 1990  Age:         33 year old   Sj MRN:    [id number] Telephone Information:  Home Phone [phone]   Mobile [phone]   Home Phone Not on file.       Address:  [address]  [place] [address] Email address:  [email or web address]      Emergency Contact(s)    Name Relationship Lgl Grd Work Phone Home Phone Mobile Phone   1. ANDRESMARCIA Aunt    [phone]           Primary language:  English     needed? No   [place] [facility]:  [phone] op. 1  Other communication barriers:None    Preferred Method of Communication:   Joanne   Current living arrangement: I live alone    Mobility Status/ Medical Equipment: Independent w/Device        Health Maintenance  Health Maintenance Reviewed: Due/Overdue   Health Maintenance Due   Topic Date Due    MICROALBUMIN  Never done    PARATHYROID  Never done    ADVANCE CARE PLANNING  Never done    MEDICARE ANNUAL WELLNESS VISIT  Never done    HEPATITIS A IMMUNIZATION (1 of 2 - Risk 2-dose series) Never done    HEPATITIS B IMMUNIZATION (1 of 3 - Risk Dialysis 4-dose series) Never done    URINE DRUG SCREEN  12/09/2021    LIPID  06/28/2023    BMP  01/19/2024     HEMOGLOBIN  04/19/2024       My Access Plan  Medical Emergency 911   Primary Clinic Line [facility] - [phone]   24 Hour Appointment Line [phone] or  [phone]) (toll-free)   24 Hour Nurse Line [phone] (toll-free)   Preferred Urgent Care [facility], [phone]     Preferred Hospital [facility]  [phone]     Preferred Pharmacy [id number] - [place], [place] - [address] AT [facility] [address]     Behavioral Health Crisis Line The National Suicide Prevention Lifeline at 1-579.910.3629 or Text/Call 928           My Care Team Members  Patient Care Team         Relationship Specialty Notifications Start End    Kole Gomez MD PCP - General Family Medicine Admissions 6/5/23     Phone: [phone] Fax: [phone]         [address] [place] [address]    Andrew Doherty MD MD   7/6/22     Fax: [phone]         Ramez Kirkland DO MD Nephrology  7/7/22     Phone: [phone] Fax: [phone] [address] [address]    Kole Gomez MD Assigned PCP   7/16/22     Phone: [phone] Fax: [phone]         [address] [place] [address]    Kun Pelayo Assigned Behavioral Health Provider   7/23/22     Rayshawn Will MD MD Gastroenterology  7/26/22     Phone: [phone] Fax: [phone]         [address] [address]    Sharad Montero APRN CNP Nurse Practitioner Nephrology  7/26/22     Phone: [phone] Fax: [phone]         [address] [place] [address]    Karishma Lopez MD Anesthesiologist Pain Medicine  8/16/22     Phone: [phone] Pager: [phone] Fax: [phone]         [facility] [address]    Rayshawn Will MD Assigned Gastroenterology Provider   10/8/22     Phone: [phone] Fax:  [phone]         [address] [facility] [address] [facility] [address]    Paz Shirley BSW Lead Care Coordinator Primary Care - CC Admissions 8/31/23     Phone: [phone] Fax: [phone]        Cipriano Arteaga MD Assigned Surgical Provider   10/28/23     Phone: [phone] Fax: [phone]         [address] [facility] [address] [facility] [address]    Amadou Roa, PhD Psychologist Neuropsychology  11/8/23     Phone: [phone] Fax: [phone]         [address] [place] [address]    Jacqueline Brown PADanielleC Physician Assistant   1/11/24     Phone: [phone] Fax: [phone]         [address] [place] [address]                My Care Plans  Self Management and Treatment Plan    Care Plan  Care Plan: Mental Health       Problem: Needs to schedule psychiatry  appt       Priority: High Onset Date: 12/20/2023    Note:      I will schedule Psychiatry and psychology appts  Barriers:scheduling  Strengths: able to make calls  Patient expressed understanding of goal: good  Action steps to achieve this goal:  1. I will  schedule a psychiatry and psychology appt ( 1/25/24 and 1/26/24)  2. I will keep appts  3. I will communicate with [facility]                     Care Plan: Food resources       Problem: Difficulty obtaining food resources due to limited transportation                   Care Plan: Discussion with providers to ensure all are in sync       Problem: Concern that all providers may not be in sync with his care       Goal: I will discuss my current needs, ensure my care team is in sync with my care needs       Start Date: 9/1/2023 Expected End Date: 2/16/2024    This Visit's Progress: 40% Recent Progress: 10%    Priority: High    Note:     Barriers: concern all care team members are not in sync with his care needs   Strengths: will discuss above at pre transplant assessment visit in September, will call and schedule provider visit to update, has been in contact to coordinate is  "\"support care team\" for potential future transplant   Patient expressed understanding of goal:   Action steps to achieve this goal:yes  1. I will find time to call and schedule provider appt  2. I will arrange medical transport  3. I will discuss my current needs                             Care Plan: General       Problem: HP GENERAL PROBLEM needs PCA       Priority: High Onset Date: 1/18/2024    Note:     I need a PCA , frustration over agency  Barriers: availability  Strengths: capable of making calls   Patient expressed understanding of goal: yes  Action steps to achieve this goal:  1. I will continue to work with the [facility]  2. I will provide necessary paperwork   3. I will communicate regularly with [facility]        Goal: General Goal - please update text                             Action Plans on File: none                      Advance Care Plans/Directives:   Advanced Care Plan/Directives on file:   No    Discussed with patient/caregiver(s): No data recorded           My Medical and Care Information  Problem List   Patient Active Problem List   Diagnosis    Necrotizing pancreatitis    ESRD (end stage renal disease) on dialysis (H)    Cirrhosis of liver (H)    Muscle weakness    Contracture of joint of foot, unspecified laterality    DIMITRI (generalized anxiety disorder)    Alcohol abuse, in remission    Chronic pain    Pancreatic insufficiency    Anemia in chronic kidney disease    Acquired bilateral pes cavus          Care Coordination Start Date: 8/30/2023   Frequency of Care Coordination: monthly, more frequently as needed     Form Last Updated: 01/25/2024       "

## 2024-01-25 NOTE — PROGRESS NOTES
"  The patient has been notified of following:      \"This virtual  visit will be conducted via a call between you and your physician/provider. We have found that certain health care needs can be provided without the need for a physical exam.  This service lets us provide the care you need virtually/via video   If a prescription is necessary we can send it directly to your pharmacy.  If lab work is needed we can place an order for that and you can then stop by our lab to have the test done at a later time.     Virtual/Video visits are billed at different rates depending on your insurance coverage.Some insurers they may be billed the same as an in-person visit.  Please reach out to your insurance provider with any questions.    Patient has given verbal consent for virtual  visit : Yes      Ho w would you like to obtain your AVS? Mail a copy  If the video visit is dropped, the invitation should be resent by: Send to e-mail at: [email or web address]  Will anyone else be joining your video visit? No            Psychiatric  Out- Patient  Follow Up Progress Note  Date of visit:1/25/2024           Discussion of Care and Treatment Recommendations:   This is a 33 year old male with a past medical history of end-stage renal disease, on hemodialysis 3 times per week.  History of generalized anxiety disorder, history of chronic pain.      PCP: Prescribing stimulants     Last visit 11/22/2023.  Recommendation at last visit .  1.Depression : Ambulatory referral to psychology- individual psychotherapy    2.  Depression-start duloxetine 30 mg daily for 7 days then increase dose to 60 mg  Patient and I reviewed diagnosis and treatment plan and patient agrees with following recommendations:  Ongoing education given regarding diagnostic and treatment options with adequate verbalization of understandi     Patient and I reviewed diagnosis and treatment plan and patient agrees with following recommendations:  Ongoing education given " regarding diagnostic and treatment options with adequate verbalization of understanding.  Plan   1.Depression : Ambulatory referral to psychology- individual psychotherapy    2.  Depression- Continue Duloxetine 60 mg daily   Anxiety : Hydroxyzine 25 mg TID PRN  Patient and I reviewed diagnosis and treatment plan and patient agrees with following recommendations:  Ongoing education given regarding diagnostic and treatment options with adequate verbalization of understandi            DIagnoses:     Generalized anxiety disorder  Major depressive disorder untreated  History of ADHD    Patient Active Problem List   Diagnosis    Necrotizing pancreatitis    ESRD (end stage renal disease) on dialysis (H)    Cirrhosis of liver (H)    Muscle weakness    Contracture of joint of foot, unspecified laterality    DIMITRI (generalized anxiety disorder)    Alcohol abuse, in remission    Chronic pain    Pancreatic insufficiency    Anemia in chronic kidney disease    Acquired bilateral pes cavus             Chief Complaint / Subjective:    Chief complaint: Depression and Anxiety     History of Present Illness:   Per patient statement : Has been tolerating a trial of duloxetine and is now currently at 60 mg.  Also utilizing as needed hydroxyzine for anxiety.  Both symptoms are fairly well-managed.  Occasional residual anxiety remains but hydroxyzine has been helpful.  Currently feeling frustrated due to financial constraints but has a  who is helping follow-up with his financial assistance paperwork.  Offers no other concerns today    Mental Status Examination:   Appearance: Well groomed, good eye contact   Orientation: Patient alert and oriented to person, place, time, and situation  Reliability:  Patient appears to be an adequate historian.    Behavior: cooperative   Speech: Speech is spontaneous and coherent, with a normal rate, rhythm and tone.    Language:There are no difficulties with expressive or receptive language as  "observed throughout the interview.    Mood: Described as \"ok\".    Affect: congruent   Judgement: Able to make basic decision regarding safety.  Insight: Good awareness of physical and mental health conditions and aware of needs around care for these.  Gait and station: unable to assess  Thought process: Logical   Thought content: No evidence of delusions or paranoia.    Hallucinations : No evidence of any hallucination  Thought content: No evidence of delusions or paranoia.   Suicidal /Homical Ideations:  No thoughts of self harm or suicide. No thoughts of harming others.  Associations: Connected  Fund of knowledge: Average  Attention / Concentration: Able to remain focused during the interview with minimal distractibility or need for redirection.  Short Term Memory: Grossly intact as evidence by client recalling themes and ideas discussed.  Long Term Memory: Intact  Motor Status: unable to asse    Drug/treatment history and current pattern of use:   Drug/treatment history and current pattern of use:   Drug of choice: Alcohol   Last alcohol use : 2021  History of alcohol abuse and history of cirrhosis and pancreatis and kidney failure   Nicotine -1/2 pack a day   Cannabis- Denies   Denies use of all other mood altering substances      Medication changes: See Above   Medication adherence: compliant  Medication side effects: absent  Information about medications: Side effects, benefits and alternative treatments discussed and patient agrees .    Psychotherapy: Supportive therapy day-to-day living    Education: Diet, exercise, abstinence from drugs and alcohol, patient will not drive if sedated and medications or  under influence of any substance    Lab Results:   Personally reviewed and discussed with the patient    Lab Results   Component Value Date    WBC 6.4 10/19/2023    HGB 13.3 10/19/2023    HCT 37.5 (L) 10/19/2023     10/19/2023    CHOL 162 06/28/2022    TRIG 70 06/28/2022    HDL 62 06/28/2022    ALT 29 " 10/19/2023    AST 26 10/19/2023     10/19/2023    BUN 25.1 (H) 10/19/2023    CO2 32 (H) 10/19/2023    TSH 1.59 06/28/2022    PSA 0.49 06/28/2022    INR 1.11 10/19/2023       Vital signs:  There were no vitals taken for this visit.  Telemedicine visit-no vital signs completed  Allergies: Patient has no known allergies.         Medications:     Current Outpatient Medications   Medication    amphetamine-dextroamphetamine (ADDERALL XR) 30 MG 24 hr capsule    [START ON 1/2/2025] amphetamine-dextroamphetamine (ADDERALL XR) 30 MG 24 hr capsule    amphetamine-dextroamphetamine (ADDERALL XR) 30 MG 24 hr capsule    amphetamine-dextroamphetamine (ADDERALL) 20 MG tablet    amphetamine-dextroamphetamine (ADDERALL) 20 MG tablet    amphetamine-dextroamphetamine (ADDERALL) 20 MG tablet    DULoxetine (CYMBALTA) 60 MG capsule    hydrOXYzine (VISTARIL) 25 MG capsule    lidocaine (XYLOCAINE) 5 % external ointment    lipase-protease-amylase (CREON 24) 00665-59541-419357 units CPEP per EC capsule    midodrine (PROAMATINE) 10 MG tablet    midodrine (PROAMATINE) 5 MG tablet    pregabalin (LYRICA) 300 MG capsule    prochlorperazine (COMPAZINE) 5 MG tablet    sevelamer carbonate, RENVELA, 0.8 GM PACK Packet    silver nitrate (ARZOL) 75-25 % miscellaneous    tiZANidine (ZANAFLEX) 2 MG tablet    torsemide (DEMADEX) 20 MG tablet    vitamin (B COMPLEX) capsule    acetaminophen (TYLENOL) 325 MG/10.15ML solution    ALPRAZolam (XANAX) 0.5 MG tablet    amphetamine-dextroamphetamine (ADDERALL XR) 30 MG 24 hr capsule    amphetamine-dextroamphetamine (ADDERALL) 20 MG tablet    Cetaphil Moisturizing (CETAPHIL) external lotion    diclofenac (VOLTAREN) 1 % topical gel    DULoxetine (CYMBALTA) 30 MG capsule    folic acid (FOLVITE) 1 MG tablet    Guar Gum (FIBER MODULAR, NUTRISOURCE FIBER,) packet    lactulose (CHRONULAC) 10 GM/15ML solution    naloxone (NARCAN) 4 MG/0.1ML nasal spray    nortriptyline (PAMELOR) 25 MG capsule    pantoprazole (PROTONIX)  40 MG EC tablet    potassium chloride (KLOR-CON) 20 MEQ packet    rOPINIRole (REQUIP) 0.25 MG tablet    sodium bicarbonate 325 MG tablet    triamcinolone (KENALOG) 0.1 % external cream     No current facility-administered medications for this visit.               Medication adherence: Reviewed risk/benefits of medication , Patient able to verbalize understanding of side effects and Patient verbally consents to taking medications      PSYCHOEDUCATION:  Medication side effects and alternatives reviewed. Health promotion activities recommended and reviewed today. All questions addressed. Education and counseling completed regarding risks and benefits of medications and psychotherapy options.  Consent provided by patient/guardian  Call the psychiatric nurse line with medication questions or concerns at [phone].  [employer] may be used to communicate with your provider, but this is not intended to be used for emergencies.  SEROTONIN SYNDROME:  Discussed risks of Serotonin syndrome (ie, serotonin toxicity) which is a potentially life-threatening condition associated with increased serotonergic activity in the central nervous system (CNS). It is seen with therapeutic medication use, inadvertent interactions between drugs, and intentional self-poisoning. Serotonin syndrome may involve a spectrum of clinical findings, which often include mental status changes, autonomic hyperactivity, and neuromuscular abnormalities.    STIMULANT THERAPY: Side effects discussed including but not limited to cardiac (including HTN, tachycardia, sudden death), motor/tic, appetite/growth, mood lability and sleep disruption. This is a controlled substance with risk for abuse, need to keep in a safe keep place and cannot replace lost scripts  HARM REDUCTION:  Discussions regarding effects of mood altering substances, alcohol and cannabis, on mood and that approach is harm reduction, will continue to prescribe meds as they work to cut back use.     SAFETY:  We all care about your loved one's safety. To reduce the risk of self-harm, remove access to all:  Firearms, Medicines (both prescribed and over-the-counter), Knives and other sharp objects, Ropes and like materials, and Alcohol  SLEEP HYGIENE: establish a sleep routine, limit screen time 1 hour prior to bed, use bed for sleep only, take sleep/medications on time (including sleepy time tea, trazadone or herbal treatments such as melatonin), aroma therapy, limit caffeine/sugar, yoga, guided imagery, stretch, meditation, limit naps to 20 minutes, make a temperature change in the room, white noise, be mindful of slowing down breathing, take a warm bath/shower, frequently wash sheets, and journaling.   Medlineplus.gov is information for patients.  It is run by the [employer] and it contains information about all disorders, diseases and all medications.              Review of Systems:      ROS:    Subjective Data Only- Tele-Health Visit    10 point ROS was negative except for the items listed in HPI.      Crisis Resources:    Present to the Emergency Department as needed or call after hours crisis line at [phone] or [phone].   [place] Crisis Text Line: Text [place] to 081005.  Suicide LifeLine Chat: suicidepreDurianaline.org/chat/.  National Suicide Prevention Lifeline: 613.536.3180 (TTY: 554.826.6184). Call anytime for help.  (www.suicidepreventionlifeline.org)  National [place] on Mental Illness (www.baljinder.org): 762.752.6347 or 080-997-5664.  Mental Health Association (www.mentalhealth.org): 242.746.9005 or 885-409-1692.      Coordination of Care:   More than 30 minutes spent on this visit  with more than 50% of time spent on coordination of care including: Educating patient about diagnosis, prognosis, side effects and benefits of medications, diet, exercise.  Time also spent providing supportive therapy regarding above issues.    Video-Visit Details    Type of service:  Video  Visit    Originating Location (pt. Location): Home    Distant Location (provider location): Providers Remote Office     Platform used for Video Visit: La      This note was created using a dictation system. All typing errors or contextual distortion is unintentional and software inherent.  Start Time : 1230  End time :

## 2024-01-25 NOTE — PROGRESS NOTES
"Clinic Care Coordination Contact    Follow Up Progress Note      Assessment: This is quite a confusing situation, SW attempted several times today to clarify patient's current services,  and needs.  Patient continues to describe in detail his current concerns, some that occurred in the past, are in process with another worker or have been addressed.  Patient reports his friend was his PCA, which was working well.  The PCA company stopped paying friend reporting that PCA assessment must be completed.  Patient reported having previously completed 2 PCA assessments while in nursing facility, stated he was approved for 70 hours per week.  Patient stated he obtained an Ombudsman worker through [place] [employer] [facility] related to concerns with previous facility stay.  Patient stated after facility discharge, the PCA was not in place as he thought would be for him.  Patient reported the [employer] has assisted with obtaining a PCA/CADI assessment and with iniating needed paperwork.  Patient reports a PCA/CADI assessment was completed via phone last week.  He states this assessment was \"not complete\" for him as his [facility] worker was present and he could not fully participate in assessment.  Patient states that CADI paperwork is \"supposedly\" in process.  Patient reports his [facility] worker, Agnes ([phone]), is assisting with housing and paperwork needs; she has informed him she is not his CADI .  Patient reports he previously had a CSS (clinical support services) worker.  Patient reports  \"they\" (he is uncertain of who this is) are assisting him with application for new insurance.  He states having had [employer] [facility] previously and the plan is to obtain this for him again.  Patient reports he is uncertain why this is happening.  Patient reports having had \"horrible\" luck with transportation, is currently using [employer] [facility].  He reports having been through 7 agencies before this transport. "  Patient reports having missed scheduled PCP follow up due to transportation issues last week.  Patient reports scheduled follow up with psychiatry today.  He reports he has dialysis three times weekly also and has a dialysis SW.      Patient's main goal is to obtain a PCA, stating he has not had one for a year.  He is no longer communicating with friend who was previous PCA and is uncertain if he would assist in that role again.     SW inquired if patient would provide the contact information for Agnes, his [facility] Worker, to clarify her role and his current workers, services and situations noted above.  Patient provided and thanked SW for intervening.      SW left message for Loi (per VM) at [facility] on her confidential VM requesting return call to clarify patient's current service workers and roles.      Care Gaps:    Health Maintenance Due   Topic Date Due    MICROALBUMIN  Never done    PARATHYROID  Never done    ADVANCE CARE PLANNING  Never done    MEDICARE ANNUAL WELLNESS VISIT  Never done    HEPATITIS A IMMUNIZATION (1 of 2 - Risk 2-dose series) Never done    HEPATITIS B IMMUNIZATION (1 of 3 - Risk Dialysis 4-dose series) Never done    URINE DRUG SCREEN  12/09/2021    LIPID  06/28/2023    BMP  01/19/2024    HEMOGLOBIN  04/19/2024     Did not address, there are many other complicated situations that need to be resolved at present     Care Plans  Care Plan: Mental Health           Care Plan: Food resources       Problem: Difficulty obtaining food resources due to limited transportation                   Care Plan: Discussion with providers to ensure all are in sync       Problem: Concern that all providers may not be in sync with his care       Goal: I will discuss my current needs, ensure my care team is in sync with my care needs       Start Date: 9/1/2023 Expected End Date: 5/24/2024    This Visit's Progress: 50% Recent Progress: 40%    Priority: High    Note:     Barriers: concern all care  "team members are not in sync with his care needs   Strengths: will discuss/update all providers to coordinate is \"support care team\" for potential future transplant   Patient expressed understanding of goal:   Action steps to achieve this goal:yes  1. I will find time to call and schedule provider appt  2. I will arrange medical transport  3. I will discuss my current needs                             Care Plan: General       Problem: Needs PCA       Priority: High Onset Date: 1/18/2024    Note:     I need a PCA , frustration over agency  Barriers: availability  Strengths: capable of making calls   Patient expressed understanding of goal: yes  Action steps to achieve this goal:  1. I will continue to work with the [facility]  2. I will provide necessary paperwork   3. I will communicate regularly with [facility]        Goal: Obtain a PCA       Start Date: 1/25/2024 Expected End Date: 5/24/2024    Priority: High    Note:     Barriers: frustrated with \"runaround\" with the PCA process; needs a new CADI assessment   Strengths: CADI paperwork in process   Patient expressed understanding of goal: yes  Action steps to achieve this goal:  1. I will work with [facility] on CADI process/paperwork   2. CADI paperwork is in process                                 Intervention/Education provided during outreach: active listening and clarification of current services and workers      Outreach Frequency: monthly, more frequently as needed    Plan:     Care Coordinator will call patient's  in 1-2 weeks of no return call     Paz Shirley, TISHAW, MSW   [facility]  Care Coordination  [facility]  [phone]  1/25/2024 10:51 AM   "

## 2024-01-25 NOTE — PATIENT INSTRUCTIONS
"The Panel Psychiatry Program  What to Expect  Here's what to expect in the Panel Psychiatry Program.   About the program  You'll be meeting with a psychiatric doctor to check your mental health. A psychiatric doctor helps you deal with troubling thoughts and feelings by giving you medicine. They'll make sure you know the plan for your care. You may see them for a long time. When you're feeling better, they may refer you back to seeing your family doctor.   If you have any questions, we'll be glad to talk to you.  About visits  Be open  At your visits, please talk openly about your problems. It may feel hard, but it's the best way for us to help you.  Cancelling visits  If you can't come to your visit, please call us right away at [phone]. If you don't cancel at least 24 hours (1 full day) before your visit, that's \"late cancellation.\"  Not showing up for your visits  Being very late is the same as not showing up. You'll be a \"no show\" if:  You're more than 15 minutes late for a 30-minute (half hour) visit.  You're more than 30 minutes late for a 60-minute (full hour) visit.  If you cancel late or don't show up 2 times within 6 months, we may end your care.  Getting help between visits  If you need help between visits, you can call us Monday to Friday from 8 a.m. to 4:30 p.m. at [phone].  Emergency care  Call 911 or go to the nearest emergency department if your life or someone else's life is in danger.  Call 988 anytime to reach the national Suicide and Crisis hotline.  Medicine refills  To refill your medicine, call your pharmacy. You can also call [facility]'s Behavioral Access at [phone], Monday to Friday, 8 a.m. to 4:30 p.m. It can take 1 to 3 business days to get a refill.   Forms, letters, and tests  You may have papers to fill out, like FMLA, short-term disability, and workability. We can help you with these forms at your visits, but you must have an appointment. You may need more " than 1 visit for this, to be in an intensive therapy program, or both.  Before we can give you medicine for ADHD, we may refer you to get tested for it or confirm it another way.  We may not be able to give you an emotional support animal letter.  We don't do mental health checks ordered by the court.   We don't do mental health testing, but we can refer you to get tested.   Thank you for choosing us for your care.  For informational purposes only. Not to replace the advice of your health care provider. Copyright   2022 [facility]. All rights reserved. [place] [id number] - 12/22      After Visit Summary   Continue medications as prescribed  Have your pharmacy contact us for a refill if you are running low on medications (We may ask you to come into clinic to get a refill from the nurse  No Alcohol or drug use  No driving if sedated  Call the clinic with any questions or concerns   Reach out for help if you feel like hurting yourself or others ([facility] [phone]: [address] [facility], [address] or [facility] Suicide Hotline   [phone] , call 911 or go to nearest Emergency room     Crisis Resources:    Present to the Emergency Department as needed or call after hours crisis line at [phone] or [phone].   [place] Crisis Text Line: Text [place] to 391099.  Suicide LifeLine Chat: suicidepreventionlifeline.org/chat/.  National Suicide Prevention Lifeline: 865.925.1699 (TTY: 346.660.4470). Call anytime for help.  (www.suicidepreventionlifeline.org)  National [place] on Mental Illness (www.baljinder.org): 129.520.9948 or 777-336-5187.  Mental Health Association (www.mentalhealth.org): 623.815.9278 or 276-479-7116.       Follow up as directed, for your appointments, per your After Visit Summary Form.

## 2024-01-25 NOTE — TELEPHONE ENCOUNTER
Reason for call:  Medication     If this is a refill request, has the caller requested the refill from the pharmacy already? Yes    Will the patient be using a [facility]? No    Name of the pharmacy and phone number for the current request: [id number] - [place], [place] - [address] [place] [address] AT [place] [address]   [phone]     Name of the medication requested:   DULoxetine (CYMBALTA) 60 MG capsule     Other request: Current instructions state 1 tablet for 7 days then increase to two tablets, however the medication is in capsules, pharmacy believes that provider mean to say 1 cap daily. Please consult.     Phone number to reach patient:  Home number on file [phone] (home)    Best Time:  ANY    Can we leave a detailed message on this number?  YES    Travel screening: Not Applicable

## 2024-01-25 NOTE — TELEPHONE ENCOUNTER
Transplant Social Work Services Phone Call      Data: Writer informed patient had questions regarding his insurance coverage.  Intervention: Writer called patient and asked what his questions were regarding his insurance.  Assessment: Patient was tangential in his talking.  Writer unable to determine what patient was trying to tell this writer.  Writer asked follow up questions but patient would then start talking about another unrelated subject.  Writer unable to determine if patient actually had questions regarding his insurance.  He did indicate he was receiving assistance from the [facility] social worker.  Writer determined it appears patient has assistance with all of his issues.  Patient did indicated he had everything under control.    Plan:SW will remain available to assist as indicated.     RONNA Srivastava, [facility]  Kidney/Pancreas Transplant   [phone]

## 2024-01-25 NOTE — NURSING NOTE
Is the patient currently in the state of [place]? YES    Visit mode:VIDEO    If the visit is dropped, the patient can be reconnected by: VIDEO VISIT: Text to cell phone:   Telephone Information:   Mobile [phone]       Will anyone else be joining the visit? NO  (If patient encounters technical issues they should call [phone]    How would you like to obtain your AVS? MyChart    Are changes needed to the allergy or medication list? No    Reason for visit: RECHECK    Valentine MULLEN

## 2024-01-26 ENCOUNTER — VIRTUAL VISIT (OUTPATIENT)
Dept: PSYCHOLOGY | Facility: CLINIC | Age: 34
End: 2024-01-26
Payer: MEDICARE

## 2024-01-26 DIAGNOSIS — F43.23 ADJUSTMENT DISORDER WITH MIXED ANXIETY AND DEPRESSED MOOD: Primary | ICD-10-CM

## 2024-01-26 PROCEDURE — 90791 PSYCH DIAGNOSTIC EVALUATION: CPT | Mod: 95 | Performed by: SOCIAL WORKER

## 2024-01-26 ASSESSMENT — PATIENT HEALTH QUESTIONNAIRE - PHQ9
10. IF YOU CHECKED OFF ANY PROBLEMS, HOW DIFFICULT HAVE THESE PROBLEMS MADE IT FOR YOU TO DO YOUR WORK, TAKE CARE OF THINGS AT HOME, OR GET ALONG WITH OTHER PEOPLE: SOMEWHAT DIFFICULT
SUM OF ALL RESPONSES TO PHQ QUESTIONS 1-9: 3
SUM OF ALL RESPONSES TO PHQ QUESTIONS 1-9: 3

## 2024-01-26 ASSESSMENT — ANXIETY QUESTIONNAIRES
7. FEELING AFRAID AS IF SOMETHING AWFUL MIGHT HAPPEN: NOT AT ALL
5. BEING SO RESTLESS THAT IT IS HARD TO SIT STILL: NOT AT ALL
7. FEELING AFRAID AS IF SOMETHING AWFUL MIGHT HAPPEN: NOT AT ALL
IF YOU CHECKED OFF ANY PROBLEMS ON THIS QUESTIONNAIRE, HOW DIFFICULT HAVE THESE PROBLEMS MADE IT FOR YOU TO DO YOUR WORK, TAKE CARE OF THINGS AT HOME, OR GET ALONG WITH OTHER PEOPLE: SOMEWHAT DIFFICULT
6. BECOMING EASILY ANNOYED OR IRRITABLE: NOT AT ALL
GAD7 TOTAL SCORE: 3
8. IF YOU CHECKED OFF ANY PROBLEMS, HOW DIFFICULT HAVE THESE MADE IT FOR YOU TO DO YOUR WORK, TAKE CARE OF THINGS AT HOME, OR GET ALONG WITH OTHER PEOPLE?: SOMEWHAT DIFFICULT
1. FEELING NERVOUS, ANXIOUS, OR ON EDGE: SEVERAL DAYS
GAD7 TOTAL SCORE: 3
GAD7 TOTAL SCORE: 3
4. TROUBLE RELAXING: SEVERAL DAYS
3. WORRYING TOO MUCH ABOUT DIFFERENT THINGS: SEVERAL DAYS
2. NOT BEING ABLE TO STOP OR CONTROL WORRYING: NOT AT ALL

## 2024-01-26 NOTE — PROGRESS NOTES
"[facility]      PATIENT'S NAME: Dax Villareal  PREFERRED NAME: Dax  PRONOUNS:     he/him  MRN: [id number]  : 1990  ADDRESS: [address]  [place] [place] [address]  [facility]. NUMBER:  [id number]  DATE OF SERVICE: 24  START TIME: 1006  END TIME: 1044  PREFERRED PHONE: [phone]  May we leave a program related message: Yes  EMERGENCY CONTACT: was obtained demographics .  SERVICE MODALITY:  Video Visit:      Provider verified identity through the following two step process.  Patient provided:  Patient  and Patient address    Telemedicine Visit: The patient's condition can be safely assessed and treated via synchronous audio and visual telemedicine encounter.      Reason for Telemedicine Visit: Patient has requested telehealth visit    Originating Site (Patient Location): Patient's home    Distant Site (Provider Location): Provider Remote Setting- Home Office    Consent:  The patient/guardian has verbally consented to: the potential risks and benefits of telemedicine (video visit) versus in person care; bill my insurance or make self-payment for services provided; and responsibility for payment of non-covered services.     Patient would like the video invitation sent by:  Send to e-mail at: [email or web address]    Mode of Communication:  Video Conference via [facility]    Distant Location (Provider):  Off-site    As the provider I attest to compliance with applicable laws and regulations related to telemedicine.    Started video needed to switch to phone due to bad connection      UNIVERSAL ADULT Mental Health DIAGNOSTIC ASSESSMENT    Identifying Information:  Patient is a 33 year old,   individual.  Patient was referred for an assessment by referring provider.  Patient attended the session alone.    Chief Complaint:   The reason for seeking services at this time is: \"Unsure\".  The problem(s) began three years ago patient received news that they would need a " kidney transplant .    Patient has not attempted to resolve these concerns in the past.    Social/Family History:  Patient reported they grew up in [place]  .  They were raised by biological parents  .  Parents were always together.  Patient reported that their childhood was pretty good childhood.  Patient described their current relationships with family of origin as good.     The patient describes their cultural background as .  Cultural influences and impact on patient's life structure, values, norms, and healthcare: Unsure.    These factors will be addressed in the Preliminary Treatment plan. Patient identified their preferred language to be English.     Patient reported had no significant delays in developmental tasks.   Patient's highest education level was some college  .  Patient identified the following learning problems: attention and concentration.  Modifications will not be used to assist communication in therapy.  Patient reports they are  able to understand written materials.    Patient reported the following relationship history.  Patient's current relationship status is single for most my life.   Patient identified their sexual orientation as heterosexual.  Patient reported having   child(ivonne). Patient identified siblings as part of their support system.  Patient identified the quality of these relationships as stable and meaningful,  .      Patient's current living/housing situation involves staying in own home/apartment.  The immediate members of family and household include Noni, 35,Sister and they report that housing is stable.    Patient is currently disabled.  Patient reports their finances are obtained through SSDI disability. Patient does identify finances as a current stressor.      Patient reported that they have been involved with the legal system.  Dui . Patient does not report being under probation/ parole/ jurisdiction. They are not under any current court jurisdiction.  .    Patient's Strengths and Limitations:  Patient identified the following strengths or resources that will help them succeed in treatment: friends / good social support and family support. Things that may interfere with the patient's success in treatment include: financial hardship.     Assessments:  The following assessments were completed by patient for this visit:  PHQ9:       5/25/2023     9:20 AM 6/12/2023     1:15 PM 7/20/2023     7:16 AM 10/10/2023     2:42 PM 11/21/2023    12:45 PM 12/5/2023    12:50 PM 1/26/2024     9:55 AM   PHQ-9 SCORE   PHQ-9 Total Score MyChart 7 (Mild depression) 8 (Mild depression)  11 (Moderate depression) 5 (Mild depression) 8 (Mild depression) 3 (Minimal depression)   PHQ-9 Total Score 7 8 6 11 5 8 3     GAD7:       7/21/2022     2:06 PM 8/23/2022     9:03 AM 10/11/2022    10:00 AM 4/17/2023     2:26 PM 12/5/2023    12:51 PM 1/26/2024     9:55 AM   DIMITRI-7 SCORE   Total Score 7 (mild anxiety) 19 (severe anxiety) 13 (moderate anxiety) 21 (severe anxiety) 6 (mild anxiety) 3 (minimal anxiety)   Total Score 7 19 13 21 6 3     PROMIS 10-Global Health (all questions and answers displayed):       12/5/2023    12:52 PM 1/25/2024    12:25 PM   PROMIS 10   In general, would you say your health is: Very good Fair   In general, would you say your quality of life is: Fair Poor   In general, how would you rate your physical health? Good Good   In general, how would you rate your mental health, including your mood and your ability to think? Good Fair   In general, how would you rate your satisfaction with your social activities and relationships? Poor Poor   In general, please rate how well you carry out your usual social activities and roles Fair Fair   To what extent are you able to carry out your everyday physical activities such as walking, climbing stairs, carrying groceries, or moving a chair? Not at all A little   In the past 7 days, how often have you been bothered by emotional problems  such as feeling anxious, depressed, or irritable? Often Often   In the past 7 days, how would you rate your fatigue on average? Severe Moderate   In the past 7 days, how would you rate your pain on average, where 0 means no pain, and 10 means worst imaginable pain? 6 4   In general, would you say your health is: 4    In general, would you say your quality of life is: 2    In general, how would you rate your physical health? 3    In general, how would you rate your mental health, including your mood and your ability to think? 3    In general, how would you rate your satisfaction with your social activities and relationships? 1    In general, please rate how well you carry out your usual social activities and roles. (This includes activities at home, at work and in your community, and responsibilities as a parent, child, spouse, employee, friend, etc.) 2    To what extent are you able to carry out your everyday physical activities such as walking, climbing stairs, carrying groceries, or moving a chair? 1    In the past 7 days, how often have you been bothered by emotional problems such as feeling anxious, depressed, or irritable? 4    In the past 7 days, how would you rate your fatigue on average? 4    In the past 7 days, how would you rate your pain on average, where 0 means no pain, and 10 means worst imaginable pain? 6    Global Mental Health Score 8    8 6       Global Physical Health Score 9    9 11       PROMIS TOTAL - SUBSCORES 17    17 17           Information is confidential and restricted. Go to Review Flowsheets to unlock data.    Multiple values from one day are sorted in reverse-chronological order     [place] Suicide Severity Rating Scale (Short Version)      1/11/2021    10:00 AM 2/9/2021    12:11 AM 6/28/2021     9:00 PM 4/1/2023     3:28 PM 5/22/2023     3:50 AM 6/17/2023     6:56 PM 8/25/2023    12:35 AM   [place] Suicide Severity Rating (Short Version)   Over the past 2 weeks have you felt down,  depressed, or hopeless? no no yes no no no no   Over the past 2 weeks have you had thoughts of killing yourself? no no no no no no no   Have you ever attempted to kill yourself? no no no no no no no           Personal and Family Medical History:  Patient does report a family history of mental health concerns.  Patient reports family history includes Alcoholism in his brother, father, mother, and sister; Attention Deficit Disorder in his mother; Cancer in his mother..     Patient does not report Mental Health Diagnosis or Treatment.      Patient has had a physical exam to rule out medical causes for current symptoms.  Date of last physical exam was within the past year. Client was encouraged to follow up with PCP if symptoms were to develop. The patient has a [facility] Provider, who is named Kole Gomez..  Patient reports the following current medical concerns: kidney transplant .  Patient reports chronic pain.  There are not significant appetite / nutritional concerns / weight changes.   Patient does report a history of head injury / trauma / cognitive impairment.          Medication Adherence:  Patient reports taking.  .    Patient Allergies:  No Known Allergies    Medical History:    Past Medical History:   Diagnosis Date    ADHD 2008    Alcohol abuse, in remission     Alcoholic cirrhosis of liver with ascites (H)     Anemia in chronic kidney disease     Chronic pain     Current smoker     End stage renal disease (H)     Hypotension, unspecified hypotension type     Malnutrition (H24)     Metabolic acidosis     Necrotizing pancreatitis     Pancreatic insufficiency     Pericarditis     Recurrent pleural effusion          Current Mental Status Exam:   Appearance:  Phone session    Eye Contact:  Phone session   Psychomotor:  Phone session       Gait / station:  Phone session  Attitude / Demeanor: Cooperative  Friendly  Speech      Rate / Production: Emotional Talkative      Volume:  Normal  volume       Language:  intact  Mood:   Anxious  Sad  Grieving  Affect:   Worrisome    Thought Content: Clear   Thought Process: Coherent       Associations: No loosening of associations  Insight:   Fair   Judgment:  Intact   Orientation:  All  Attention/concentration: Good    Substance Use:   Patient did report a family history of substance use concerns; see medical history section for details.  Patient has not received chemical dependency treatment in the past.  Patient has been to detox.      Patient is not currently receiving any chemical dependency treatment.           Substance History of use Age of first use Date of last use     Pattern and duration of use (include amounts and frequency)   Alcohol used in the past   14 02/01/21    Cannabis   used in the past 14 01/01/24      Amphetamines   currently use  prescribed 01/26/24    Cocaine/crack    never used          Hallucinogens never used            Inhalants never used            Heroin never used            Other Opiates used in the past prescribed 09/30/23    Benzodiazepine   used in the past prescribed 08/01/23    Barbiturates never used        Over the counter meds used in the past prescribed 01/01/21    Caffeine currently use Lol no idea      Nicotine  used in the past Unknown? 01/01/21    Other substances not listed above:  Identify:  currently use Very broad 06/12/23      Patient reported the following problems as a result of their substance use: DUI.            Significant Losses / Trauma / Abuse / Neglect Issues:   Patient did not  serve in the .  There are indications or report of significant loss, trauma, abuse or neglect issues related to: death of mother and three years medical issues .  Concerns for possible neglect are not present.     Safety Assessment:   Patient denies current homicidal ideation and behaviors.  Patient denies current self-injurious ideation and behaviors.    Patient denied risk behaviors associated with substance use.   Patient  denies any high risk behaviors associated with mental health symptoms.  Patient reports the following current concerns for their personal safety: None.  Patient reports there are not firearms in the house.       .    History of Safety Concerns:  Patient denied a history of homicidal ideation.     Patient denied a history of personal safety concerns.    Patient denied a history of assaultive behaviors.    Patient denied a history of sexual assault behaviors.     Patient denied a history of risk behaviors associated with substance use.  Patient denies any history of high risk behaviors associated with mental health symptoms.  Patient reports the following protective factors: forward or future oriented thinking; dedication to family or friends; safe and stable environment; purpose; help seeking behaviors when distressed; effective problem solving skills; commitment to well being; sense of meaning; positive social skills; healthy fear of risky behaviors or pain; financial stability; strong sense of self worth or esteem; sense of personal control or determination; access to a variety of clinical interventions and pets; other    Risk Plan:  See Recommendations for Safety and Risk Management Plan    Review of Symptoms per patient report:   Depression: Change in sleep, Lack of interest, Difficulties concentrating, and Irritability  Diana:  No Symptoms  Psychosis: No Symptoms  Anxiety: Nervousness, Sleep disturbance, Ruminations, and Irritability  Panic:  No symptoms  Post Traumatic Stress Disorder:  Experienced traumatic event see hx    Eating Disorder: No Symptoms  ADD / ADHD:  Dx by hx  Conduct Disorder: No symptoms  Autism Spectrum Disorder: No symptoms  Obsessive Compulsive Disorder: No Symptoms    Patient reports the following compulsive behaviors and treatment history:  none .      Diagnostic Criteria:   Adjustment Disorder  A. The development of emotional or behavioral symptoms in response to an identifiable  stressor(s) occurring within 3 months of the onset of the stressor(s)  B. These symptoms or behaviors are clinically significant, as evidenced by one or both of the following:       - Marked distress that is out of proportion to the severity/intensity of the stressor (with consideration for external context & culture)       - Significant impairment in social, occupational, or other important areas of functioning  C. The stress-related disturbance does not meet criteria for another disorder & is not not an exacerbation of another mental disorder  D. The symptoms do not represent normal bereavement  E. Once the stressor or its consequences have terminated, the symptoms do not persist for more than an additional 6 months       * Adjustment Disorder with Mixed Anxiety and Depressed Mood: The predominant manifestation is a combination of depression and anxiety    Functional Status:  Patient reports the following functional impairments:  chronic disease management, health maintenance, self-care, and social interactions.     Nonprogrammatic care:  Patient is requesting basic services to address current mental health concerns.    Clinical Summary:  1. Psychosocial, Cultural and Contextual Factors: chronic health and grief and loss  .  2. Principal DSM5 Diagnoses  (Sustained by DSM5 Criteria Listed Above):   Adjustment Disorders  309.28 (F43.23) With mixed anxiety and depressed mood.    5. Prognosis: Expect Improvement and Relieve Acute Symptoms.  6. Likely consequences of symptoms if not treated: worsening symptoms.  7. Client strengths include:  creative, good listener, motivated, open to learning, and open to suggestions / feedback .     Recommendations:     1. Plan for Safety and Risk Management:   Safety and Risk: Recommended that patient call 911 or go to the local ED should there be a change in any of these risk factors..          Report to child / adult protection services was NA.         3. Initial Treatment will  focus on:    Adjustment with anxiety and depression .     4. Resources/Service Plan:    services are not indicated.   Modifications to assist communication are not indicated.   Additional disability accommodations are not indicated.      5. Collaboration:   Collaboration / coordination of treatment will be initiated with the following  support professionals:  none .      6.  Referrals:   The following referral(s) will be initiated: Outpatient Mental Adal Therapy.       A Release of Information has been obtained for the following:  none .        7. LUDWIN:    LUDWIN:  Discussed the general effects of drugs and alcohol on health and well-being.     8. Records:   These were reviewed at time of assessment.   Information in this assessment was obtained from the medical record and  provided by patient who is a good historian.    Patient will have open access to their mental health medical record.    9.   Interactive Complexity: No    10. Safety Plan:  Patient denied any current/recent/lifetime history of suicidal ideation and/or behaviors.  No safety plan indicated at this time.     Provider Name/ Credentials:  RADHA Flowers  January 26, 2024

## 2024-01-26 NOTE — PROGRESS NOTES
Clinic Care Coordination Contact    Loi (not Agnes) Bryce [facility], left return message.      Per her message:     She is not the CADI .  She is embedded in the housing program, her role is to reconnect to long term care in home services.  However, patient's MA was turned off.  Loi is working on re-establishing patient's MA benefits, assisting with coordination of a new CADI assessment for evaluation/implementation of in home services, such as PCA.  Loi reports she is out of the office until 2/5/2024.      SW will inform patient.  Will call in 1 month as scheduled.      SW informed patient, he wised for this to be sent in [employer], which was done.    Paz Shirley, TISHAW, MSW   [facility]  Care Coordination  [facility]  [phone]  1/26/2024 8:30 AM

## 2024-01-30 ENCOUNTER — OFFICE VISIT (OUTPATIENT)
Dept: FAMILY MEDICINE | Facility: CLINIC | Age: 34
End: 2024-01-30
Payer: MEDICARE

## 2024-01-30 VITALS
HEART RATE: 112 BPM | TEMPERATURE: 97.9 F | DIASTOLIC BLOOD PRESSURE: 60 MMHG | SYSTOLIC BLOOD PRESSURE: 130 MMHG | WEIGHT: 181.4 LBS | RESPIRATION RATE: 25 BRPM | BODY MASS INDEX: 28.47 KG/M2 | HEIGHT: 67 IN | OXYGEN SATURATION: 98 %

## 2024-01-30 DIAGNOSIS — F90.9 ATTENTION DEFICIT HYPERACTIVITY DISORDER (ADHD), UNSPECIFIED ADHD TYPE: ICD-10-CM

## 2024-01-30 DIAGNOSIS — Z00.00 ENCOUNTER FOR ANNUAL PHYSICAL EXAM: Primary | ICD-10-CM

## 2024-01-30 DIAGNOSIS — F41.1 GAD (GENERALIZED ANXIETY DISORDER): ICD-10-CM

## 2024-01-30 DIAGNOSIS — D63.1 ANEMIA IN CHRONIC KIDNEY DISEASE, ON CHRONIC DIALYSIS (H): ICD-10-CM

## 2024-01-30 DIAGNOSIS — Z23 NEED FOR VACCINE FOR TD (TETANUS-DIPHTHERIA): ICD-10-CM

## 2024-01-30 DIAGNOSIS — Z99.2 ANEMIA IN CHRONIC KIDNEY DISEASE, ON CHRONIC DIALYSIS (H): ICD-10-CM

## 2024-01-30 DIAGNOSIS — N18.6 ANEMIA IN CHRONIC KIDNEY DISEASE, ON CHRONIC DIALYSIS (H): ICD-10-CM

## 2024-01-30 DIAGNOSIS — Z99.2 ESRD (END STAGE RENAL DISEASE) ON DIALYSIS (H): ICD-10-CM

## 2024-01-30 DIAGNOSIS — F10.11 ALCOHOL ABUSE, IN REMISSION: ICD-10-CM

## 2024-01-30 DIAGNOSIS — K74.69 OTHER CIRRHOSIS OF LIVER (H): ICD-10-CM

## 2024-01-30 DIAGNOSIS — N18.6 ESRD (END STAGE RENAL DISEASE) ON DIALYSIS (H): ICD-10-CM

## 2024-01-30 PROCEDURE — 80061 LIPID PANEL: CPT | Performed by: FAMILY MEDICINE

## 2024-01-30 PROCEDURE — G0402 INITIAL PREVENTIVE EXAM: HCPCS | Performed by: FAMILY MEDICINE

## 2024-01-30 PROCEDURE — 90714 TD VACC NO PRESV 7 YRS+ IM: CPT | Performed by: FAMILY MEDICINE

## 2024-01-30 PROCEDURE — 36415 COLL VENOUS BLD VENIPUNCTURE: CPT | Performed by: FAMILY MEDICINE

## 2024-01-30 PROCEDURE — 90471 IMMUNIZATION ADMIN: CPT | Performed by: FAMILY MEDICINE

## 2024-01-30 PROCEDURE — 99213 OFFICE O/P EST LOW 20 MIN: CPT | Mod: 25 | Performed by: FAMILY MEDICINE

## 2024-01-30 PROCEDURE — G0481 DRUG TEST DEF 8-14 CLASSES: HCPCS | Performed by: FAMILY MEDICINE

## 2024-01-30 RX ORDER — DEXTROAMPHETAMINE SACCHARATE, AMPHETAMINE ASPARTATE, DEXTROAMPHETAMINE SULFATE AND AMPHETAMINE SULFATE 5; 5; 5; 5 MG/1; MG/1; MG/1; MG/1
TABLET ORAL
Qty: 60 TABLET | Refills: 0 | Status: SHIPPED | OUTPATIENT
Start: 2024-03-01 | End: 2024-03-26 | Stop reason: ALTCHOICE

## 2024-01-30 RX ORDER — DEXTROAMPHETAMINE SACCHARATE, AMPHETAMINE ASPARTATE MONOHYDRATE, DEXTROAMPHETAMINE SULFATE AND AMPHETAMINE SULFATE 7.5; 7.5; 7.5; 7.5 MG/1; MG/1; MG/1; MG/1
30 CAPSULE, EXTENDED RELEASE ORAL DAILY
Qty: 30 CAPSULE | Refills: 0 | Status: SHIPPED | OUTPATIENT
Start: 2024-04-01 | End: 2024-03-26 | Stop reason: ALTCHOICE

## 2024-01-30 RX ORDER — DEXTROAMPHETAMINE SACCHARATE, AMPHETAMINE ASPARTATE MONOHYDRATE, DEXTROAMPHETAMINE SULFATE AND AMPHETAMINE SULFATE 7.5; 7.5; 7.5; 7.5 MG/1; MG/1; MG/1; MG/1
30 CAPSULE, EXTENDED RELEASE ORAL DAILY
Qty: 30 CAPSULE | Refills: 0 | Status: SHIPPED | OUTPATIENT
Start: 2024-03-01 | End: 2024-03-26 | Stop reason: ALTCHOICE

## 2024-01-30 RX ORDER — DEXTROAMPHETAMINE SACCHARATE, AMPHETAMINE ASPARTATE, DEXTROAMPHETAMINE SULFATE AND AMPHETAMINE SULFATE 5; 5; 5; 5 MG/1; MG/1; MG/1; MG/1
TABLET ORAL
Qty: 60 TABLET | Refills: 0 | Status: SHIPPED | OUTPATIENT
Start: 2024-04-01 | End: 2024-03-26 | Stop reason: ALTCHOICE

## 2024-01-30 RX ORDER — DEXTROAMPHETAMINE SACCHARATE, AMPHETAMINE ASPARTATE, DEXTROAMPHETAMINE SULFATE AND AMPHETAMINE SULFATE 5; 5; 5; 5 MG/1; MG/1; MG/1; MG/1
TABLET ORAL
Qty: 60 TABLET | Refills: 0 | Status: SHIPPED | OUTPATIENT
Start: 2024-01-30 | End: 2024-03-26 | Stop reason: ALTCHOICE

## 2024-01-30 RX ORDER — DEXTROAMPHETAMINE SACCHARATE, AMPHETAMINE ASPARTATE MONOHYDRATE, DEXTROAMPHETAMINE SULFATE AND AMPHETAMINE SULFATE 7.5; 7.5; 7.5; 7.5 MG/1; MG/1; MG/1; MG/1
30 CAPSULE, EXTENDED RELEASE ORAL DAILY
Qty: 30 CAPSULE | Refills: 0 | Status: SHIPPED | OUTPATIENT
Start: 2024-01-30 | End: 2024-02-29

## 2024-01-30 ASSESSMENT — ACTIVITIES OF DAILY LIVING (ADL)
CURRENT_FUNCTION: HOUSEWORK REQUIRES ASSISTANCE
CURRENT_FUNCTION: PREPARING MEALS REQUIRES ASSISTANCE
CURRENT_FUNCTION: TRANSPORTATION REQUIRES ASSISTANCE
CURRENT_FUNCTION: SHOPPING REQUIRES ASSISTANCE
CURRENT_FUNCTION: LAUNDRY REQUIRES ASSISTANCE

## 2024-01-30 ASSESSMENT — ENCOUNTER SYMPTOMS
DIARRHEA: 0
HEMATOCHEZIA: 0
CONSTIPATION: 0
WEAKNESS: 1
CHILLS: 0
HEADACHES: 0
NERVOUS/ANXIOUS: 0
PALPITATIONS: 0
JOINT SWELLING: 0
FREQUENCY: 1
FEVER: 0
DYSURIA: 0
HEMATURIA: 0
NAUSEA: 1
ABDOMINAL PAIN: 0
HEARTBURN: 0
MYALGIAS: 0
SHORTNESS OF BREATH: 1
SORE THROAT: 0
EYE PAIN: 0
ARTHRALGIAS: 0
DIZZINESS: 0
COUGH: 0
PARESTHESIAS: 0

## 2024-01-30 ASSESSMENT — PAIN SCALES - GENERAL: PAINLEVEL: MODERATE PAIN (4)

## 2024-01-30 NOTE — LETTER
February 6, 2024      Dax Villareal  [address]   [place] [address]        Dear ,    We are writing to inform you of your test results.    Total cholesterol and LDL at high side.    Please eat more vegetables and fruits.    Resulted Orders   Lipid panel reflex to direct LDL Non-fasting   Result Value Ref Range    Cholesterol 260 (H) <200 mg/dL    Triglycerides 211 (H) <150 mg/dL    Direct Measure HDL 38 (L) >=40 mg/dL    LDL Cholesterol Calculated 180 (H) <=100 mg/dL    Non HDL Cholesterol 222 (H) <130 mg/dL    Patient Fasting > 8hrs? No     Narrative    Cholesterol  Desirable:  <200 mg/dL    Triglycerides  Normal:  Less than 150 mg/dL  Borderline High:  150-199 mg/dL  High:  200-499 mg/dL  Very High:  Greater than or equal to 500 mg/dL    Direct Measure HDL  Female:  Greater than or equal to 50 mg/dL   Male:  Greater than or equal to 40 mg/dL    LDL Cholesterol  Desirable:  <100mg/dL  Above Desirable:  100-129 mg/dL   Borderline High:  130-159 mg/dL   High:  160-189 mg/dL   Very High:  >= 190 mg/dL    Non HDL Cholesterol  Desirable:  130 mg/dL  Above Desirable:  130-159 mg/dL  Borderline High:  160-189 mg/dL  High:  190-219 mg/dL  Very High:  Greater than or equal to 220 mg/dL   Urine Drug Confirmation Panel   Result Value Ref Range    Pregabalin Present (A) Absent      Comment:      Sources of pregabalin are prescription medications.    Amphetamine ng/mL 4,940 (H) <50 ng/mL    Amphetamine 5,093 Absent ng/mg [creat]      Comment:      Sources of amphetamine include illicit sources, as a scheduled prescription medication, as a metabolite of some prescription drugs, or use of an l-methamphetamine inhaler.  Amphetamine is an expected metabolite of methamphetamine. Amphetamine is also available as a scheduled prescription drug.    Narrative    This test was developed and its performance characteristics determined by the [facility],  Special Chemistry Laboratory. It  has not been cleared or approved by the FDA. The laboratory is regulated under CLIA as qualified to perform high-complexity testing. This test is used for clinical purposes. It should not be regarded as investigational or for research.    Drugs with concentrations less than the cutoff will not be reported.  The drugs with applicable detection cutoff limits that are included within the Drug Confirmation Panel are:    The following drugs are detected with a cutoff of 3 ng/ml: FENTANYL    The following drugs are detected with a cutoff of 5 ng/mL: 6-ACETYLMORPHINE, BUPRENORPHINE, NALOXONE, NORBUPRENORPHINE, NORFENTANYL    The following drugs are detected with a cutoff of 10 ng/mL: SUFENTANIL    The following drugs are detected with a cutoff of 20 ng/mL: PCP (PHENCYCLIDINE)    The following drugs are detected with a cutoff of 50 ng/mL: 7-AMINOCLONAZEPAM, 7-AMINOFLUNITRAZEPAM, ALPRAZOLAM, AMPHETAMINE, A-OH-ALPRAZOLAM, A-OH-MIDAZOLAM, A-OH-TRIAZOLAM, BENZOYLECGONINE (Cocaine Metabolite), CLONAZEPAM, COCAETHYLENE (Cocaine Metabolite), CODEINE, DIAZEPAM, DIHYDROCODEINE, EDDP (Methadone Metabolite), HYDROCODONE, HYDROMORPHONE, LORAZEPAM, MDA (3,4-Methylenedioxyamphetamine), MDEA (3,7-Kztybymvejluxn-X-ethylcathinone), MDMA (Methylenedioxyamphetamine,Ecstasy), MEPERIDINE, METHADONE, METHAMPHETAMINE, METHYLPHENIDATE (Ritalin), MORPHINE, NALTREXONE, N-DESMETH-TAPENTADOL, NORCODEINE, NORDIAZEPAM, NORMEPERIDINE, O-KENYATTA-TRAMADOL, OXAZEPAM, OXYCODONE, OXYMORPHONE, PROPOXYPHENE, RITALINIC ACID, TAPENTADOL, TEMAZEPAM, THEBAINE, TRAMADOL    The following drugs are detected with a cutoff of 100 ng/mL: GABAPENTIN, KETAMINE    The following drugs are detected with a cutoff of 200 ng/mL: PREGABALIN, XYLAZINE     Urine Creatinine for Drug Screen Panel   Result Value Ref Range    Creatinine Urine for Drug Screen 97 mg/dL      Comment:      The reference range has not been established for creatinine in random urines. The results should be  integrated into the clinical context for interpretation.       If you have any questions or concerns, please call the clinic at the number listed above.       Sincerely,      Kole Gomez MD

## 2024-01-30 NOTE — NURSING NOTE
Prior to immunization administration, verified patients identity using patient s name and date of birth. Please see Immunization Activity for additional information.     Screening Questionnaire for Adult Immunization    Are you sick today?   No   Do you have allergies to medications, food, a vaccine component or latex?   No   Have you ever had a serious reaction after receiving a vaccination?   No   Do you have a long-term health problem with heart, lung, kidney, or metabolic disease (e.g., diabetes), asthma, a blood disorder, no spleen, complement component deficiency, a cochlear implant, or a spinal fluid leak?  Are you on long-term aspirin therapy?   No   Do you have cancer, leukemia, HIV/AIDS, or any other immune system problem?   No   Do you have a parent, brother, or sister with an immune system problem?   No   In the past 3 months, have you taken medications that affect  your immune system, such as prednisone, other steroids, or anticancer drugs; drugs for the treatment of rheumatoid arthritis, Crohn s disease, or psoriasis; or have you had radiation treatments?   No   Have you had a seizure, or a brain or other nervous system problem?   No   During the past year, have you received a transfusion of blood or blood    products, or been given immune (gamma) globulin or antiviral drug?   No   For women: Are you pregnant or is there a chance you could become       pregnant during the next month?   No   Have you received any vaccinations in the past 4 weeks?   No     Immunization questionnaire answers were all negative.  [facility]        Patient instructed to remain in clinic for 15 minutes afterwards, and to report any adverse reactions.     Screening performed by Meli Faria MA on 1/30/2024 at 3:39 PM.

## 2024-01-30 NOTE — PROGRESS NOTES
"Preventive Care Visit  [facility]  Kole Gomez MD, Family Medicine  Jan 30, 2024      SUBJECTIVE:   Dax is a 33 year old, presenting for the following:  Wellness Visit    History of end-stage renal disease has been on dialysis 3 times per week.  History of generalized anxiety disorder, depression and does follow-up with a psychiatry and psychology.  History of ADHD and is on Adderall.        1/30/2024     2:25 PM   Additional Questions   Roomed by Lorena CHENG.     Are you in the first 12 months of your Medicare coverage?  Yes,  Visual Acuity:  Right Eye: 25, -1   Left Eye: 32  Both Eyes: 25    Healthy Habits:     In general, how would you rate your overall health?  Fair    Frequency of exercise:  2-3 days/week    Duration of exercise:  45-60 minutes    Do you usually eat at least 4 servings of fruit and vegetables a day, include whole grains    & fiber and avoid regularly eating high fat or \"junk\" foods?  Yes    Taking medications regularly:  Yes    Medication side effects:  Not applicable    Ability to successfully perform activities of daily living:  Transportation requires assistance, shopping requires assistance, preparing meals requires assistance, housework requires assistance and laundry requires assistance    Home Safety:  No safety concerns identified    Hearing Impairment:  No hearing concerns    In the past 6 months, have you been bothered by leaking of urine? Yes    In general, how would you rate your overall mental or emotional health?  Good    Additional concerns today:  Yes          Have you ever done Advance Care Planning? (For example, a Health Directive, POLST, or a discussion with a medical provider or your loved ones about your wishes): No, advance care planning information given to patient to review.  Patient plans to discuss their wishes with loved ones or provider.        Fall risk       Cognitive Screening   1) Repeat 3 items (Leader, Season, Table)    2) Clock " "draw: ABNORMAL 11:05a  3) 3 item recall: Recalls 3 objects  Results: 3 items recalled: COGNITIVE IMPAIRMENT LESS LIKELY    Mini-CogTM Copyright ARMIDA Wilkes. Licensed by the author for use in [facility]; reprinted with permission (kvng@.[facility]). All rights reserved.      Do you have sleep apnea, excessive snoring or daytime drowsiness? : no    Reviewed and updated as needed this visit by clinical staff                  Reviewed and updated as needed this visit by Provider                  Social History     Tobacco Use    Smoking status: Some Days     Packs/day: .25     Types: Cigarettes     Passive exposure: Current    Smokeless tobacco: Never    Tobacco comments:     8/24/2021 Patient did not want to discuss quitting but would like 4 mg lozenge to use during admission. Patient also accepted \"Quitting for Good\" workbook   Substance Use Topics    Alcohol use: Not Currently     Comment: \"no alcohol for over 1 year ago\" per pt on 5/12/22 1/30/2024     2:14 PM   Alcohol Use   Prescreen: >3 drinks/day or >7 drinks/week? Not Applicable          No data to display              Do you have a current opioid prescription? No  Do you use any other controlled substances or medications that are not prescribed by a provider? None      Current providers sharing in care for this patient include:   Patient Care Team:  Kole Gomez MD as PCP - General (Family Medicine)  Andrew Doherty MD as Ramez White DO as MD (Nephrology)  Kole Gomez MD as Assigned PCP  Rayshawn Will MD as MD (Gastroenterology)  Sharad Montero APRN CNP as Nurse Practitioner (Nephrology)  Karishma Lopez MD as Anesthesiologist (Pain Medicine)  Rayshawn Will MD as Assigned Gastroenterology Provider  Paz Shirley BSW as Lead Care Coordinator (Primary Care - CC)  Cipriano Arteaga MD as Assigned Surgical Provider  Amadou Rao, PhD as Psychologist (Neuropsychology)  Jacqueline Brown, " SUSIE as Physician Assistant  Herminia Valdivia NP as Assigned Behavioral Health Provider    The following health maintenance items are reviewed in Epic and correct as of today:  Health Maintenance   Topic Date Due    MICROALBUMIN  Never done    PARATHYROID  Never done    ADVANCE CARE PLANNING  Never done    MEDICARE ANNUAL WELLNESS VISIT  Never done    HEPATITIS A IMMUNIZATION (1 of 2 - Risk 2-dose series) Never done    HEPATITIS B IMMUNIZATION (1 of 3 - Risk Dialysis 4-dose series) Never done    URINE DRUG SCREEN  12/09/2021    LIPID  06/28/2023    BMP  01/19/2024    HEMOGLOBIN  04/19/2024    PHQ-9  07/26/2024    NICOTINE/TOBACCO CESSATION COUNSELING Q 1 YR  10/11/2024    ANNUAL REVIEW OF HM ORDERS  10/11/2024    ALT  10/19/2024    CBC  10/19/2024    HF ACTION PLAN  11/21/2026    DTAP/TDAP/TD IMMUNIZATION (7 - Td or Tdap) 01/11/2031    PHOSPHORUS  Completed    TSH W/FREE T4 REFLEX  Completed    HEPATITIS C SCREENING  Completed    HIV SCREENING  Completed    DEPRESSION ACTION PLAN  Completed    INFLUENZA VACCINE  Completed    URINALYSIS  Completed    ALK PHOS  Completed    IPV IMMUNIZATION  Completed    COVID-19 Vaccine  Completed    Pneumococcal Vaccine: Pediatrics (0 to 5 Years) and At-Risk Patients (6 to 64 Years)  Addressed    HPV IMMUNIZATION  Aged Out    MENINGITIS IMMUNIZATION  Aged Out    RSV MONOCLONAL ANTIBODY  Aged Out     Lab work is in process  Labs reviewed in EPIC      Review of Systems   Constitutional:  Negative for chills and fever.   HENT:  Negative for congestion, ear pain, hearing loss and sore throat.    Eyes:  Negative for pain and visual disturbance.   Respiratory:  Positive for shortness of breath. Negative for cough.    Cardiovascular:  Negative for chest pain and palpitations.   Gastrointestinal:  Positive for nausea. Negative for abdominal pain, constipation and diarrhea.   Genitourinary:  Positive for frequency and urgency. Negative for dysuria, genital sores, hematuria and penile  "discharge.   Musculoskeletal:  Negative for arthralgias, joint swelling and myalgias.   Skin:  Negative for rash.   Neurological:  Positive for weakness. Negative for dizziness and headaches.   Psychiatric/Behavioral:  The patient is not nervous/anxious.         Review of Systems  Constitutional, HEENT, cardiovascular, pulmonary, GI, , musculoskeletal, neuro, skin, endocrine and psych systems are negative, except as otherwise noted.    OBJECTIVE:   There were no vitals taken for this visit.   Estimated body mass index is 27.62 kg/m  as calculated from the following:    Height as of 11/21/23: 1.702 m (5' 7\").    Weight as of 11/21/23: 80 kg (176 lb 5.9 oz).  Physical Exam  GENERAL: alert and no distress  NECK: no adenopathy, no asymmetry, masses, or scars  RESP: lungs clear to auscultation - no rales, rhonchi or wheezes  CV: regular rate and rhythm, normal S1 S2, no S3 or S4, no murmur, click or rub, no peripheral edema  ABDOMEN: soft, nontender, no hepatosplenomegaly, no masses and bowel sounds normal  MS: no gross musculoskeletal defects noted, no edema  NEURO: Normal strength and tone, mentation intact and speech normal  PSYCH: mentation appears normal, affect normal/bright    Diagnostic Test Results:  Labs reviewed in Epic  Orders Placed This Encounter   Procedures    TD,PF 7+(TENIVAC)    Lipid panel reflex to direct LDL Non-fasting    Urine Drug Confirmation Panel    Urine Creatinine for Drug Screen Panel    [id number] - Urine Drug Confirmation Panel (Comprehensive)        ASSESSMENT / PLAN:   (Z00.00) Encounter for annual physical exam  (primary encounter diagnosis)  Comment: normal   Plan: Lipid panel reflex to direct LDL Non-fasting,         CANCELED: TD(ADULT),2 LF TETANUS         TOXOID,PF,ADSORBED(TDVAX)         Discussed diet, exercise, wellness and other preventive recommendations related to health maintenance.   Follow up as needed for acute issues.   Fall risk precautions.   Physical exam recommended in " one year.       (N18.6,  D63.1,  Z99.2) Anemia in chronic kidney disease, on chronic dialysis (H)  Comment:   Plan: on HD,   Managed by nephrology.    (F10.11) Alcohol abuse, in remission  Comment:   Plan: [id number] - Urine Drug Confirmation Panel         (Comprehensive)            (K74.69) Other cirrhosis of liver (H)  Comment:   Plan: stable,     (F41.1) DIMITRI (generalized anxiety disorder)  Comment:   Plan: follow up with psychiatry, and Psychology.  Continue with current med    (Z23) Need for vaccine for Td (tetanus-diphtheria)  Comment:   Plan: booster    (F90.9) Attention deficit hyperactivity disorder (ADHD), unspecified ADHD type  Comment:   Plan: amphetamine-dextroamphetamine (ADDERALL) 20 MG         tablet, amphetamine-dextroamphetamine         (ADDERALL) 20 MG tablet,         amphetamine-dextroamphetamine (ADDERALL) 20 MG         tablet, amphetamine-dextroamphetamine (ADDERALL        XR) 30 MG 24 hr capsule,         amphetamine-dextroamphetamine (ADDERALL XR) 30         MG 24 hr capsule, amphetamine-dextroamphetamine        (ADDERALL XR) 30 MG 24 hr capsule        Continue with current med  Refilled, next 3 months.    (N18.6,  Z99.2) ESRD (end stage renal disease) on dialysis (H)  Comment:   Plan: on Dialysis.     Counseling  Reviewed preventive health counseling, as reflected in patient instructions       Regular exercise       Healthy diet/nutrition       Fall risk prevention       Immunizations  Vaccinated for: Td          He reports that he has been smoking cigarettes. He has been smoking an average of 0.3 packs per day. He has been exposed to tobacco smoke. He has never used smokeless tobacco.  Nicotine/Tobacco Cessation Plan  Self help information given to patient  Cutting down.      Appropriate preventive services were discussed with this patient, including applicable screening as appropriate for fall prevention, nutrition, physical activity, Tobacco-use cessation, weight loss and cognition.  Checklist  reviewing preventive services available has been given to the patient.    Reviewed patients plan of care and provided an AVS. The Basic Care Plan (routine screening as documented in Health Maintenance) for Dax meets the Care Plan requirement. This Care Plan has been established and reviewed with the Patient.        Signed Electronically by: Kole Gomez MD    Identified Health Risks  I have reviewed Opioid Use Disorder and Substance Use Disorder risk factors and made any needed referrals.   Answers submitted by the patient for this visit:  Annual Preventive Visit (Submitted on 1/30/2024)  Chief Complaint: Annual Exam:  Blood in stool: No  heartburn: No  peripheral edema: No  mood changes: No  Skin sensation changes: No  impotence: Yes

## 2024-01-31 LAB
CHOLEST SERPL-MCNC: 260 MG/DL
CREAT UR-MCNC: 97 MG/DL
FASTING STATUS PATIENT QL REPORTED: NO
HDLC SERPL-MCNC: 38 MG/DL
LDLC SERPL CALC-MCNC: 180 MG/DL
NONHDLC SERPL-MCNC: 222 MG/DL
TRIGL SERPL-MCNC: 211 MG/DL

## 2024-02-02 LAB
AMPHET UR CFM-MCNC: 4940 NG/ML
AMPHET/CREAT UR: 5093 NG/MG {CREAT}
PREGABALIN UR QL CFM: PRESENT

## 2024-02-06 ENCOUNTER — OFFICE VISIT (OUTPATIENT)
Dept: NEUROPSYCHOLOGY | Facility: CLINIC | Age: 34
End: 2024-02-06
Attending: PHYSICIAN ASSISTANT
Payer: MEDICARE

## 2024-02-06 DIAGNOSIS — F10.11 ALCOHOL ABUSE, IN REMISSION: ICD-10-CM

## 2024-02-06 DIAGNOSIS — F33.1 MAJOR DEPRESSIVE DISORDER, RECURRENT EPISODE, MODERATE (H): ICD-10-CM

## 2024-02-06 DIAGNOSIS — F90.9 ATTENTION DEFICIT HYPERACTIVITY DISORDER (ADHD), UNSPECIFIED ADHD TYPE: ICD-10-CM

## 2024-02-06 DIAGNOSIS — R41.844 EXECUTIVE FUNCTION DEFICIT: Primary | ICD-10-CM

## 2024-02-06 PROCEDURE — 90791 PSYCH DIAGNOSTIC EVALUATION: CPT | Performed by: CLINICAL NEUROPSYCHOLOGIST

## 2024-02-06 PROCEDURE — 96133 NRPSYC TST EVAL PHYS/QHP EA: CPT | Performed by: CLINICAL NEUROPSYCHOLOGIST

## 2024-02-06 PROCEDURE — 96132 NRPSYC TST EVAL PHYS/QHP 1ST: CPT | Performed by: CLINICAL NEUROPSYCHOLOGIST

## 2024-02-06 PROCEDURE — 96139 PSYCL/NRPSYC TST TECH EA: CPT | Performed by: CLINICAL NEUROPSYCHOLOGIST

## 2024-02-06 PROCEDURE — 96138 PSYCL/NRPSYC TECH 1ST: CPT | Performed by: CLINICAL NEUROPSYCHOLOGIST

## 2024-02-06 NOTE — LETTER
2/6/2024       RE: Dax Villareal  [address]  [place] [place] [facility] [address]     Dear Colleague,    Thank you for referring your patient, Dax Villareal, to the [facility] NEUROPSYCHOLOGY [place] [facility]. Please see a copy of my visit note below.    The patient was seen for neuropsychological evaluation at the request of Anali Asencio PA-C for the purposes of diagnostic clarification and treatment planning. 171 minutes of test administration and scoring were provided by this writer. Please see Dr. Amadou Roa's report for a full interpretation of the findings.    Kaylie Faith   Psychometrist      [facility] [place] [address]      NEUROPSYCHOLOGICAL EVALUATION    RELEVANT HISTORY AND REASON FOR REFERRAL    This is a report of neuropsychological consultation regarding Dax Villareal, a 33-year-old, right-handed man with approximately 13 years of formal education. His medical history per records includes end-stage renal disease on dialysis, liver cirrhosis, necrotizing pancreatitis, chronic pain, acquired bilateral pes cavus, generalized anxiety disorder, attention deficit/hyperactivity disorder, depressive disorder, posttraumatic stress disorder, and alcohol abuse reportedly in remission. He is being considered for a kidney transplant. Per chart notes, members of the transplant team have been concerned about his capacity to understand his complex medical issues and adhere to a complex medical regimen. There were also concerns about poor motivation, poor insight, and lack of understanding of psychological matters affecting his functioning. He is referred in this context by Anali Asencio PA-C, to better understand his cognitive functioning and psychosocial factors that affect transplant candidacy.    Mr. Villareal is unaccompanied to today's evaluation. Throughout the visit, he is markedly perseverative  and tangential, and it is difficult to get clear answers on specific questions. However, he is eventually able to demonstrate a fair understanding of his medical history. He tells me that all of his liver and kidney problems began with a hospitalization in May 2021, which was initially prompted by pancreatitis. He says he has been on dialysis since then. He says he previously had transplant evaluations for liver issues, though his liver functioning has apparently shown improvement and he is no longer being considered for liver transplant. Regarding the current kidney transplant workup, he believes that this appointment and some dental procedures are all that he has left before he could be considered for listing. He is unable to articulate a clear treatment goal, stating that he wants to  help the people that were before me and after.  Regarding risks, he says he has been told it can be an average of 7 years on the waitlist for a person of his blood type, his body could reject the organ and he needs to take immunosuppression medications, he needs to be careful about secondary infections because of his immunosuppressed status, and he also has to be careful about wound care to prevent infections early on after the surgery.    Mr. Villareal lives alone in his own apartment. He previously lived with his mother, and she passed away in 2022. He feels he has good support from his brother, sister, some aunts, and some cousins. It sounds like he has an aunt who lives in the [facility] area ([place]), but the others are not local and live in different places around [place], [place], [place], and [place]. He does not have a clear plan for postsurgical caregiving. He wonders if he could go live with his sister in [place], [place] after the surgery. He wonders if a couple of friends nearby might be able to help out. There is nothing certain, but he thinks his brother may be moving to the [place] at some point in the next 1-2  years.    After his lengthy hospitalization in 2021 (lasted from May to October) and then the death of his mother, he had a long stay in a skilled nursing facility. Today, he airs many perseverative grievances about his treatment in the facility, along with medical errors that he believes happened at various hospital stays, recurrent problems with health insurance and other bureaucracies, discussions with local, state, and federal officials to complain about these matters, and his desire to pursue legal actions against various facilities and providers.     As noted, medical records indicate a psychiatric diagnostic history including generalized anxiety disorder, depression, PTSD, and ADHD. Today, he tells me he has never been diagnosed with a mental health disorder aside from ADHD. Potential sources of trauma/abuse are described as his experiences in medical facilities over the last few years. In the interview, he does not endorse symptoms in the major domains of psychopathology, but he declines to answer any questions about psychosis-spectrum symptoms such as hallucinations and delusions. He denies any suicidal ideation or history of self-injurious behaviors. He says he has never had a psychiatric hospitalization. He describes seeing a psychotherapy provider recently at the request of the transplant team (Mirna Hernandez, [facility] system on 1/26/2024). He has also seen a psychiatric nurse practitioner (Herminia Valdivia, [facility]), and in our records has prescriptions for duloxetine, Adderall, Xanax, and hydroxyzine. He initially tells me he has never been prescribed psychiatric medications prior to seeing Ms. Valdivia, though he later notes use of psychostimulants as a child.    Mr. Villareal tells me that alcohol abuse became a significant problem because he was self-medicating for chronic heartburn that nobody could treat properly. I am not able to obtain clear information about his patterns of use today. In records from 2021, there are  descriptions of drinking 5 to 7 days/week, going through a 1.75 L bottle of liquor every 3 to 5 days. PEth testing was negative on 6/28/2022. An additional PEth lab was ordered in October 2023, but it was not fulfilled. Today, he tells me he is not drinking and quit completely in 2021. He tells me he had some court-ordered classes lasting 8-12 hours at some point (records indicate a DUI history), but he never had to participate in a chemical dependency treatment program.    He tells me that he has been approved for medical cannabis, but he declines to discuss any use of cannabis today. He had a positive test for cannabinoids and June 2022. I do not see any more recent labs. Labs for other illicit drugs (e.g., meth, MDMA, opioids) were negative in June 2022. As noted, he is prescribed Adderall, and amphetamine screening was positive on 1/30/2024.    He reports continued use of tobacco, on the order of 5-10 cigarettes/day.    He tells me that ADHD was diagnosed in childhood and treated with stimulant medications. He says he has just restarted Adderall recently. He denies any history of special-education services or being held back any grades. In 12th grade, it was determined that he did not have enough credits to graduate at the end of the year. He went on to earn a GED. He later took some college courses, perhaps totaling about a year's worth at a few different community colleges.    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 He has not worked for several years. Records indicate he is on SSDI.    He has many concerns about his disability status, CADI waiver, and PCA approvals. He feels he has been receiving conflicting and erroneous information from various government agencies, community support groups, and insurance agencies. He describes a desire to pursue legal action over the loss of PCA services because his initial PCA was not getting paid. He does not drive. He is able to stand and walk to  some degree, but he mostly uses a motorized wheelchair. Records indicate he needs assistance for transportation, shopping, meal preparation, housework, and laundry. He denies any concerns about his cognitive functioning. He says he manages all of his medications independently and without difficulty. Aside from the trouble with figuring out whether or not he is service eligible or has insurance properly set up, he says he is able to manage all of his finances and paperwork independently.    Regarding neurological history, he reports no instances of stroke, seizure, or TBI. He describes unilateral weakness with limited mobility in his right arm, but he ascribes that to effects of an orthopedic surgery. He reports a chronic slight unsteadiness or tremor in his hands. He reports chronic bodily pain across various systems as well as some headaches during times of stress, but he does not provide clear descriptions of migraines. He reports severe neuropathy, especially in his feet. He does not endorse any changes with his senses of smell, taste, hearing, or vision. I see indication that a head CT was obtained on 10/29/2021 while at [facility], but I cannot view the contents of the report. I do not see any other neuroimaging studies in available records.    Per records, his family history includes a sibling who committed suicide about 12 years ago, ADHD for his mother, and alcohol problems for his mother, father, brother, and sister.    His current medication list includes acetaminophen, alprazolam, amphetamine-dextroamphetamine, diclofenac gel, duloxetine, folic acid, fiber, hydroxyzine, lidocaine, lipase-protease-amylase, midodrine, naloxone, nortriptyline, pantoprazole, potassium chloride, pregabalin, prochlorperazine, ropinirole, sevelamer carbonate, silver nitrate, sodium bicarbonate, tizanidine, and torsemide.     BEHAVIORAL OBSERVATIONS    As noted above, Mr. Villareal demonstrated perseverative thought  processes. He had marked inertia to his speech, often ignored ignoring attempts to interrupt or redirect him. His thought processes tended to be tangential and disorganized. Circumlocution was present. He perseverated on grievances about medical maltreatment and bureaucratic runaround, and the overall content of his speech raised a question of persecutory ideation. His affect was flat. He had reduced eye contact at baseline, and other times he had a fixed or hard stare. Direct assessment was not performed, but subjectively, his pupils appeared mildly dilated. He tended towards impulsivity or carelessness in his approach to many cognitive tasks. The pace of the appointment was much slower than usual, and multiple planned tests had to be deferred because of time taken up by talkative discursions. However, he persisted well with the requested procedures, and results on measures of cognitive performance validity were acceptable. The data are seen as valid estimations of his cognitive abilities.    MEASURES ADMINISTERED    The following measures were administered by a trained psychometrist, under my supervision:    Orientation: Time, Place, Basic Personal Information, Recent [place] Presidents; Wide Range Achievement Test 5: Lea Visual Acuity Screen; Mathews Making Test; ILS Health and Safety Questionnaire; Repeatable Battery for the Assessment of Neuropsychological Status; Porteus Maze Test; Dot Counting Test; Webb Depression Inventory-II; State-Trait Anxiety Inventory; [place] Multiphasic Personality Inventory-3.    RESULTS AND INTERPRETATION    Orientation: Orientation was within normal expectations for time, place, basic personal information, and basic cultural information.    Language & Related Skills: Basic reading and pronunciation skills were average. Confrontation naming was average. Category-based verbal fluency was below average.    Visual Perceptual & Constructional Skills: Binocular, uncorrected, near-point  visual acuity was 20/25 on Lea screening. Visual perceptual matching and discrimination of variably oriented lines was average. Copying a complex geometric figure was average.     Mental Speed & Executive Functioning: As noted above, speeded verbal fluency performance was below average. Cognitive processing speed was exceptionally low on a timed transcription task. Visual scanning and graphomotor sequencing under simple conditions was average for speed and had no errors. Scanning and sequencing under greater executive demands to control divided attention was below average for speed and included 6 errors. Planning, foresight, and using feedback to overcome errors were broadly within normal limits on a maze-solving test. His ability to demonstrate practical reasoning for a variety of health and safety scenarios was below normal expectations.     Attention & Working Memory: Immediate auditory attention and working memory were below average for repeating digit strings.    Learning & Anterograde Memory: Immediate verbal memory for short stories was average, and delayed story recall was average. Learning a word list over repeated readings was low average, but delayed free recall of the list was high average, and delayed recognition of the list was low average. Delayed free recall of the complex figure was below average.     Emotional & Behavioral Functioning: He endorsed symptoms consistent with a major depressive episode at least moderate in severity (BDI-II = 24). He rated in-the-moment anxiety during testing as normal (State Anxiety = 61st %ile). He rated general, day-to-day anxiety as normal (Trait Anxiety = 21st %ile). His response set to a longer questionnaire for objective assessment of personality functioning and emotional coping patterns (MMPI-3) was potentially invalid. There was evidence of significant response inconsistency, which could reflect reading or language limitations, cognitive dysfunction, or  carelessness in making responses. There were also indications of symptom over-reporting for both psychiatric and somatic dysfunction, endorsing a larger than average number of problems rarely described by those with genuine and severe psychopathology and with genuine medical problems--though such a pattern of responding could reflect wide-ranging distress due to severe psychopathology. In this context, the broader MMPI-3 profile must be interpreted with caution. At face value, the profile indicates significant dysfunction relating to formal thought disorder (e.g., delusions, hallucinations, unrealistic thinking) and externalizing behaviors (e.g., substance abuse, acting out, poor impulse control). He reported multiple somatic complaints potentially including head pain, neurological symptoms, and gastrointestinal symptoms. He is likely to be preoccupied with physical health concerns, and there could be psychological components to some of the somatic complaints. He reported excessive worry, preoccupation, and rumination. He reported significant persecutory ideation, such as believing that others seek to harm him. He is likely to be suspicious and distrustful, to experience interpersonal difficulties as a result of his suspiciousness, to lack insight into these matters, and to blame other people for his difficulties. He reported a significant history of antisocial behavior and/or substance abuse. He reported episodes of heightened excitation and energy levels, potentially representing manic or hypomanic experiences. As noted, the validity of this profile is uncertain, given inconsistent responding and potential over-endorsement of problems. Diagnostic considerations would include psychosis-spectrum conditions (e.g., schizophrenia, delusional disorder, schizotypal or schizoid personality disorders), substance use disorders, bipolar mood disorders and/or impulse control disorders, generalized anxiety disorder, and  somatic symptom disorders/health anxiety. The MMPI-3 does not independently render psychiatric diagnoses. It raises clinical hypotheses for further exploration in the context of mental health treatment.    IMPRESSIONS AND RECOMMENDATIONS    The cognitive results are abnormal. Mr. Villareal demonstrates deficits in attention, executive functioning, and practical reasoning about health and safety. There are variable problems with cognitive speed and memory efficiency, perhaps reflecting executive interference. General frontal systems dysfunction is indicated.   His trouble demonstrating practical reasoning on health and safety is concerning for reduced insight and risk for nonadherence on medical matters.   His executive weaknesses are risk factors for mistakes in other areas of life management, and this could be related to his ongoing difficulty figuring out matters like health insurance, CADI waiver, and PCA coverage.   I strongly encourage connecting him to Social Work, to make sure he has appropriate connections to a county . PCA services seem appropriate, and I also encourage exploration of services from [facility] and [facility] workers.     He was diagnosed with ADHD in childhood, which would be a contributing factor, but I doubt developmental ADHD fully explains today's cognitive weaknesses and observed behaviors. I think an additional condition is present. Whether it is primarily a psychiatric or neurologic condition remains unclear. In interview, he is open about past alcohol abuse, declines to discuss cannabis use, denies any current significant mental health matters, and declines to answer questions about psychosis-spectrum symptoms. Then, on questionnaires he endorses moderately severe depression, chronic ruminative worry, persecutory ideation and signs of formal thought disorder, impulse control problems, and substance abuse. Behaviorally, he displays disorganized, tangential, and circumlocutory thought  processes, as well as marked perseveration on perceived maltreatment and grievances. His MMPI-3 profile might be invalid due to overly variable responding.   I am concerned about an undiagnosed psychosis-spectrum condition, with apparent features of cognitive disorganization and persecutory ideation. Hallucinations cannot be excluded, given he declined to answer questions.   I cannot opine on such a condition being longstanding but previously unappreciated, acquired through substance abuse, or acquired through neurological insult.   All of these options should be explored by a referral to Neurology and by his treating mental health providers (Herminia Valdivia, [facility] & Mirna Hernandez, [facility]).   His use of medications with potential for abuse should be closely monitored.   Ideally, he would have long-term connections with mental health providers, who can get to know him well and build trust, and then later provide opinions on his readiness to pursue transplant.     Abdirashid Roa, PhD, LP, ABPP  Board Certified in Clinical Neuropsychology  Licensed Psychologist [id number]      Time spent: One unit psychiatric evaluation including records review, interview, and clinical assessment by a licensed and board-certified neuropsychologist (CPT 72192). 162 minutes neuropsychological testing evaluation by a licensed and board-certified neuropsychologist, including integration of patient data, interpretation of standardized test results and clinical data, clinical decision-making, treatment planning, report, and interactive feedback to the patient (CPT 41136, 25376). 171 minutes of psychological and neuropsychological test administration and scoring by technician (CPT 48216, 89889). Diagnoses: R41.844, F90.9, F33.1, F10.11

## 2024-02-07 ENCOUNTER — TELEPHONE (OUTPATIENT)
Dept: FAMILY MEDICINE | Facility: CLINIC | Age: 34
End: 2024-02-07
Payer: MEDICARE

## 2024-02-07 DIAGNOSIS — R29.91 ABNORMAL FINDING OF FOOT: ICD-10-CM

## 2024-02-07 DIAGNOSIS — B35.1 FUNGAL INFECTION OF NAIL: Primary | ICD-10-CM

## 2024-02-07 NOTE — PROGRESS NOTES
The patient was seen for neuropsychological evaluation at the request of Anali Asencio PA-C for the purposes of diagnostic clarification and treatment planning. 171 minutes of test administration and scoring were provided by this writer. Please see Dr. Amadou Roa's report for a full interpretation of the findings.    Kaylie Faith   Psychometrist

## 2024-02-07 NOTE — TELEPHONE ENCOUNTER
Order/Referral Request    Who is requesting: patient    Orders being requested: podiatry    Reason service is needed/diagnosis: hang nail on toes-problems with big toenails     When are orders needed by: asap    Has this been discussed with Provider: yes    Does patient have a preference on a Group/Provider/Facility? no    Does patient have an appointment scheduled?: No    Where to send orders: N/A    Could we [place] this [place] you [place] or would you prefer to receive a phone call?:   No preference   Okay to leave a detailed message?: Yes at Home number on file [phone] (home)

## 2024-02-07 NOTE — TELEPHONE ENCOUNTER
I put a referral.  In the past he was seen by Podiatry tat the [facility]. ( He did not follow up with them )

## 2024-02-07 NOTE — TELEPHONE ENCOUNTER
Called patient and left a detailed voicemail message that referral for podiatry has been placed.    SEBASTIAN Hernandez  [facility]

## 2024-02-13 NOTE — PROGRESS NOTES
[facility] [place] [address]      NEUROPSYCHOLOGICAL EVALUATION    RELEVANT HISTORY AND REASON FOR REFERRAL    This is a report of neuropsychological consultation regarding Dax Villareal, a 33-year-old, right-handed man with approximately 13 years of formal education. His medical history per records includes end-stage renal disease on dialysis, liver cirrhosis, necrotizing pancreatitis, chronic pain, acquired bilateral pes cavus, generalized anxiety disorder, attention deficit/hyperactivity disorder, depressive disorder, posttraumatic stress disorder, and alcohol abuse reportedly in remission. He is being considered for a kidney transplant. Per chart notes, members of the transplant team have been concerned about his capacity to understand his complex medical issues and adhere to a complex medical regimen. There were also concerns about poor motivation, poor insight, and lack of understanding of psychological matters affecting his functioning. He is referred in this context by Anali Asencio PA-C, to better understand his cognitive functioning and psychosocial factors that affect transplant candidacy.    Mr. Villareal is unaccompanied to today's evaluation. Throughout the visit, he is markedly perseverative and tangential, and it is difficult to get clear answers on specific questions. However, he is eventually able to demonstrate a fair understanding of his medical history. He tells me that all of his liver and kidney problems began with a hospitalization in May 2021, which was initially prompted by pancreatitis. He says he has been on dialysis since then. He says he previously had transplant evaluations for liver issues, though his liver functioning has apparently shown improvement and he is no longer being considered for liver transplant. Regarding the current kidney transplant workup, he believes that this appointment and some dental procedures are all that he has left before  he could be considered for listing. He is unable to articulate a clear treatment goal, stating that he wants to  help the people that were before me and after.  Regarding risks, he says he has been told it can be an average of 7 years on the waitlist for a person of his blood type, his body could reject the organ and he needs to take immunosuppression medications, he needs to be careful about secondary infections because of his immunosuppressed status, and he also has to be careful about wound care to prevent infections early on after the surgery.    Mr. Villareal lives alone in his own apartment. He previously lived with his mother, and she passed away in 2022. He feels he has good support from his brother, sister, some aunts, and some cousins. It sounds like he has an aunt who lives in the [facility] area ([place]), but the others are not local and live in different places around [place], [place], [place], and [place]. He does not have a clear plan for postsurgical caregiving. He wonders if he could go live with his sister in [place], [place] after the surgery. He wonders if a couple of friends nearby might be able to help out. There is nothing certain, but he thinks his brother may be moving to the [place] at some point in the next 1-2 years.    After his lengthy hospitalization in 2021 (lasted from May to October) and then the death of his mother, he had a long stay in a skilled nursing facility. Today, he airs many perseverative grievances about his treatment in the facility, along with medical errors that he believes happened at various hospital stays, recurrent problems with health insurance and other bureaucracies, discussions with local, state, and federal officials to complain about these matters, and his desire to pursue legal actions against various facilities and providers.     As noted, medical records indicate a psychiatric diagnostic history including generalized anxiety disorder, depression, PTSD, and  ADHD. Today, he tells me he has never been diagnosed with a mental health disorder aside from ADHD. Potential sources of trauma/abuse are described as his experiences in medical facilities over the last few years. In the interview, he does not endorse symptoms in the major domains of psychopathology, but he declines to answer any questions about psychosis-spectrum symptoms such as hallucinations and delusions. He denies any suicidal ideation or history of self-injurious behaviors. He says he has never had a psychiatric hospitalization. He describes seeing a psychotherapy provider recently at the request of the transplant team (Mirna Hernandez, [facility] on 1/26/2024). He has also seen a psychiatric nurse practitioner (Herminia Valdivia, [facility]), and in our records has prescriptions for duloxetine, Adderall, Xanax, and hydroxyzine. He initially tells me he has never been prescribed psychiatric medications prior to seeing Ms. Valdivia, though he later notes use of psychostimulants as a child.    Mr. Villareal tells me that alcohol abuse became a significant problem because he was self-medicating for chronic heartburn that nobody could treat properly. I am not able to obtain clear information about his patterns of use today. In records from 2021, there are descriptions of drinking 5 to 7 days/week, going through a 1.75 L bottle of liquor every 3 to 5 days. PEth testing was negative on 6/28/2022. An additional PEth lab was ordered in October 2023, but it was not fulfilled. Today, he tells me he is not drinking and quit completely in 2021. He tells me he had some court-ordered classes lasting 8-12 hours at some point (records indicate a DUI history), but he never had to participate in a chemical dependency treatment program.    He tells me that he has been approved for medical cannabis, but he declines to discuss any use of cannabis today. He had a positive test for cannabinoids and June 2022. I do not see any more recent labs. Labs for  other illicit drugs (e.g., meth, MDMA, opioids) were negative in June 2022. As noted, he is prescribed Adderall, and amphetamine screening was positive on 1/30/2024.    He reports continued use of tobacco, on the order of 5-10 cigarettes/day.    He tells me that ADHD was diagnosed in childhood and treated with stimulant medications. He says he has just restarted Adderall recently. He denies any history of special-education services or being held back any grades. In 12th grade, it was determined that he did not have enough credits to graduate at the end of the year. He went on to earn a GED. He later took some college courses, perhaps totaling about a year's worth at a few different community colleges.    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 He has not worked for several years. Records indicate he is on SSDI.    He has many concerns about his disability status, CADI waiver, and PCA approvals. He feels he has been receiving conflicting and erroneous information from various government agencies, community support groups, and insurance agencies. He describes a desire to pursue legal action over the loss of PCA services because his initial PCA was not getting paid. He does not drive. He is able to stand and walk to some degree, but he mostly uses a motorized wheelchair. Records indicate he needs assistance for transportation, shopping, meal preparation, housework, and laundry. He denies any concerns about his cognitive functioning. He says he manages all of his medications independently and without difficulty. Aside from the trouble with figuring out whether or not he is service eligible or has insurance properly set up, he says he is able to manage all of his finances and paperwork independently.    Regarding neurological history, he reports no instances of stroke, seizure, or TBI. He describes unilateral weakness with limited mobility in his right arm, but he ascribes that to effects of an  orthopedic surgery. He reports a chronic slight unsteadiness or tremor in his hands. He reports chronic bodily pain across various systems as well as some headaches during times of stress, but he does not provide clear descriptions of migraines. He reports severe neuropathy, especially in his feet. He does not endorse any changes with his senses of smell, taste, hearing, or vision. I see indication that a head CT was obtained on 10/29/2021 while at [facility], but I cannot view the contents of the report. I do not see any other neuroimaging studies in available records.    Per records, his family history includes a sibling who committed suicide about 12 years ago, ADHD for his mother, and alcohol problems for his mother, father, brother, and sister.    His current medication list includes acetaminophen, alprazolam, amphetamine-dextroamphetamine, diclofenac gel, duloxetine, folic acid, fiber, hydroxyzine, lidocaine, lipase-protease-amylase, midodrine, naloxone, nortriptyline, pantoprazole, potassium chloride, pregabalin, prochlorperazine, ropinirole, sevelamer carbonate, silver nitrate, sodium bicarbonate, tizanidine, and torsemide.     BEHAVIORAL OBSERVATIONS    As noted above, Mr. Villareal demonstrated perseverative thought processes. He had marked inertia to his speech, often ignored ignoring attempts to interrupt or redirect him. His thought processes tended to be tangential and disorganized. Circumlocution was present. He perseverated on grievances about medical maltreatment and bureaucratic runaround, and the overall content of his speech raised a question of persecutory ideation. His affect was flat. He had reduced eye contact at baseline, and other times he had a fixed or hard stare. Direct assessment was not performed, but subjectively, his pupils appeared mildly dilated. He tended towards impulsivity or carelessness in his approach to many cognitive tasks. The pace of the appointment was much  slower than usual, and multiple planned tests had to be deferred because of time taken up by talkative discursions. However, he persisted well with the requested procedures, and results on measures of cognitive performance validity were acceptable. The data are seen as valid estimations of his cognitive abilities.    MEASURES ADMINISTERED    The following measures were administered by a trained psychometrist, under my supervision:    Orientation: Time, Place, Basic Personal Information, Recent [place] Presidents; Wide Range Achievement Test 5: Lea Visual Acuity Screen; [place] Making Test; ILS Health and Safety Questionnaire; Repeatable Battery for the Assessment of Neuropsychological Status; Porteus Maze Test; Dot Counting Test; Webb Depression Inventory-II; State-Trait Anxiety Inventory; [place] Multiphasic Personality Inventory-3.    RESULTS AND INTERPRETATION    Orientation: Orientation was within normal expectations for time, place, basic personal information, and basic cultural information.    Language & Related Skills: Basic reading and pronunciation skills were average. Confrontation naming was average. Category-based verbal fluency was below average.    Visual Perceptual & Constructional Skills: Binocular, uncorrected, near-point visual acuity was 20/25 on Lea screening. Visual perceptual matching and discrimination of variably oriented lines was average. Copying a complex geometric figure was average.     Mental Speed & Executive Functioning: As noted above, speeded verbal fluency performance was below average. Cognitive processing speed was exceptionally low on a timed transcription task. Visual scanning and graphomotor sequencing under simple conditions was average for speed and had no errors. Scanning and sequencing under greater executive demands to control divided attention was below average for speed and included 6 errors. Planning, foresight, and using feedback to overcome errors were broadly  within normal limits on a maze-solving test. His ability to demonstrate practical reasoning for a variety of health and safety scenarios was below normal expectations.     Attention & Working Memory: Immediate auditory attention and working memory were below average for repeating digit strings.    Learning & Anterograde Memory: Immediate verbal memory for short stories was average, and delayed story recall was average. Learning a word list over repeated readings was low average, but delayed free recall of the list was high average, and delayed recognition of the list was low average. Delayed free recall of the complex figure was below average.     Emotional & Behavioral Functioning: He endorsed symptoms consistent with a major depressive episode at least moderate in severity (BDI-II = 24). He rated in-the-moment anxiety during testing as normal (State Anxiety = 61st %ile). He rated general, day-to-day anxiety as normal (Trait Anxiety = 21st %ile). His response set to a longer questionnaire for objective assessment of personality functioning and emotional coping patterns (MMPI-3) was potentially invalid. There was evidence of significant response inconsistency, which could reflect reading or language limitations, cognitive dysfunction, or carelessness in making responses. There were also indications of symptom over-reporting for both psychiatric and somatic dysfunction, endorsing a larger than average number of problems rarely described by those with genuine and severe psychopathology and with genuine medical problems--though such a pattern of responding could reflect wide-ranging distress due to severe psychopathology. In this context, the broader MMPI-3 profile must be interpreted with caution. At face value, the profile indicates significant dysfunction relating to formal thought disorder (e.g., delusions, hallucinations, unrealistic thinking) and externalizing behaviors (e.g., substance abuse, acting out, poor  impulse control). He reported multiple somatic complaints potentially including head pain, neurological symptoms, and gastrointestinal symptoms. He is likely to be preoccupied with physical health concerns, and there could be psychological components to some of the somatic complaints. He reported excessive worry, preoccupation, and rumination. He reported significant persecutory ideation, such as believing that others seek to harm him. He is likely to be suspicious and distrustful, to experience interpersonal difficulties as a result of his suspiciousness, to lack insight into these matters, and to blame other people for his difficulties. He reported a significant history of antisocial behavior and/or substance abuse. He reported episodes of heightened excitation and energy levels, potentially representing manic or hypomanic experiences. As noted, the validity of this profile is uncertain, given inconsistent responding and potential over-endorsement of problems. Diagnostic considerations would include psychosis-spectrum conditions (e.g., schizophrenia, delusional disorder, schizotypal or schizoid personality disorders), substance use disorders, bipolar mood disorders and/or impulse control disorders, generalized anxiety disorder, and somatic symptom disorders/health anxiety. The MMPI-3 does not independently render psychiatric diagnoses. It raises clinical hypotheses for further exploration in the context of mental health treatment.    IMPRESSIONS AND RECOMMENDATIONS    The cognitive results are abnormal. Mr. Villareal demonstrates deficits in attention, executive functioning, and practical reasoning about health and safety. There are variable problems with cognitive speed and memory efficiency, perhaps reflecting executive interference. General frontal systems dysfunction is indicated.   His trouble demonstrating practical reasoning on health and safety is concerning for reduced insight and risk for nonadherence on  medical matters.   His executive weaknesses are risk factors for mistakes in other areas of life management, and this could be related to his ongoing difficulty figuring out matters like health insurance, CADI waiver, and PCA coverage.   I strongly encourage connecting him to Social Work, to make sure he has appropriate connections to a county . PCA services seem appropriate, and I also encourage exploration of services from [facility] and [facility] workers.     He was diagnosed with ADHD in childhood, which would be a contributing factor, but I doubt developmental ADHD fully explains today's cognitive weaknesses and observed behaviors. I think an additional condition is present. Whether it is primarily a psychiatric or neurologic condition remains unclear. In interview, he is open about past alcohol abuse, declines to discuss cannabis use, denies any current significant mental health matters, and declines to answer questions about psychosis-spectrum symptoms. Then, on questionnaires he endorses moderately severe depression, chronic ruminative worry, persecutory ideation and signs of formal thought disorder, impulse control problems, and substance abuse. Behaviorally, he displays disorganized, tangential, and circumlocutory thought processes, as well as marked perseveration on perceived maltreatment and grievances. His MMPI-3 profile might be invalid due to overly variable responding.   I am concerned about an undiagnosed psychosis-spectrum condition, with apparent features of cognitive disorganization and persecutory ideation. Hallucinations cannot be excluded, given he declined to answer questions.   I cannot opine on such a condition being longstanding but previously unappreciated, acquired through substance abuse, or acquired through neurological insult.   All of these options should be explored by a referral to Neurology and by his treating mental health providers (Herminia Valdivia NP & Mirna Hernandez, [facility]).    His use of medications with potential for abuse should be closely monitored.   Ideally, he would have long-term connections with mental health providers, who can get to know him well and build trust, and then later provide opinions on his readiness to pursue transplant.     Abdirashid Roa, PhD, LP, ABPP  Board Certified in Clinical Neuropsychology  Licensed Psychologist [id number]      Time spent: One unit psychiatric evaluation including records review, interview, and clinical assessment by a licensed and board-certified neuropsychologist (CPT 18776). 162 minutes neuropsychological testing evaluation by a licensed and board-certified neuropsychologist, including integration of patient data, interpretation of standardized test results and clinical data, clinical decision-making, treatment planning, report, and interactive feedback to the patient (CPT 81506, [id number]). 171 minutes of psychological and neuropsychological test administration and scoring by technician (CPT 71359, [id number]). Diagnoses: R41.844, F90.9, F33.1, F10.11

## 2024-02-13 NOTE — PROGRESS NOTES
NAME  Dax Villareal     MRN  [id number]      [birth year]     AGE  33     SEX  Male     HANDEDNESS Right     EDUCATION  13     RODNEY  24     PROVIDER  [employer]  [employer]     STATION  OP            ORIENTATION      Time  -1     Place      Personal Info.     Presidents             DCT       ERR: 2      U.5      .5      E-Score: 10                           WRAT   5     SS %ile GE   Word Reading 106 66 >12.9          TRAIL MAKING TEST       Time Errors ScS T   A 26 0 10 45   B 107 6 7 30          ILS HEALTH & SAFETY      Raw 26      T 26              RBANS   A     Raw ScS/%ile    List Learning 24 6    Story Memory 20 10    Figure Copy  19 9    Line Orientation 17 26-50    Picture Naming 9 17-25    Semantic Fluency 16 4    Digit Span  8 5    Coding  31 1    List Recall  9 >75    List Recognition 19 10-16    Story Recall  11 11    Figure Recall 10 4             Index     Immediate Mem. 87     Visuospat./Constr. 92     Language  85     Attention  53     Delayed Mem. 94     Total Scale  78            PORTEUS MAZE TEST      Test Age 14.5             STAI        Raw %ile Interpretation   State: 38 61 Normal    Trait: 26 21 Normal           MMPI-3       RCd 52 CRIN 78    RC1 65 VRIN 80    RC2 47 LANA 60    RC4 66 F 66    RC6 67 Fp 76    RC7 50 Fs 86    RC8 55 FBS 53    RC9 57 RBS 54      L 48      K 44           BDI       Raw  24     Interpretation Moderate

## 2024-02-15 ENCOUNTER — COMMITTEE REVIEW (OUTPATIENT)
Dept: TRANSPLANT | Facility: CLINIC | Age: 34
End: 2024-02-15
Payer: MEDICARE

## 2024-02-15 ENCOUNTER — TELEPHONE (OUTPATIENT)
Dept: TRANSPLANT | Facility: CLINIC | Age: 34
End: 2024-02-15
Payer: MEDICARE

## 2024-02-15 NOTE — TELEPHONE ENCOUNTER
Called patient at this time to discuss the results of the Selection Committee from yesterday with , Yu.  Discussed that the patient would need to have a family member stay with him for one year 24/7 post transplant, stay in a group home or an assisted living facility where we can guarantee that he will be set up for success post transplant.  He states that he does not agree with the neuropsychology assessment and would like to wait a year to have this assessment re performed. We confirmed that the plan then would be for us to close his evaluation and ask him to be re referred in one year when he feels he is more ready to be assessed from this standpoint.

## 2024-02-15 NOTE — LETTER
02/15/24        Dax Villareal  [address]  [place] [address]        Dear Dax,    It was a pleasure to see you recently for consideration of kidney transplantation. Your pre-transplant evaluation results were reviewed at our Multidisciplinary Selection Committee on 02/14/2024. The committee is requesting the following items are completed before determining your candidacy:    Based on the results of your neuropsychology evaluation, the selection committee has determined that you will only be a candidate for kidney transplantation at our center if you secure housing within a group home, assisted living facility or have a person who can stay with you 24/7 for one year post-transplant. This plan will need to be laid out in writing and/or the person will need to meed in person with our social work team to verify support.     For any questions, please contact the Transplant Office at [phone].      Sincerely,  Marnie Chaudhari RN   Pre-Kidney Transplant Coordinator  Direct line: [phone]       Solid Organ Transplant  [facility]

## 2024-02-15 NOTE — COMMITTEE REVIEW
Abdominal Committee Review Note     Evaluation Date: 7/12/2022  Committee Review Date: 2/14/2024    Organ being evaluated for: Kidney    Transplant Phase: Evaluation  Transplant Status: Active    Transplant Coordinator: Marnie Chaudhari  Transplant Surgeon: Dr. Arteaga    Referring Physician: Referred Self    Primary Diagnosis: ESKD  Secondary Diagnosis: RADHA     Committee Review Members:  Nephrology Yusuf Mott MD, Anali Asencio PA-C, Sharad Montero, APRN CNP   Nurse Brittni Brennan, APRN TICO, NATALIE VERA RN   Nutrition Luisa Choudhary, TERENCE   Pharmacist Margarita Cary, [facility]    - Clinical Mameedy Chisholm, [facility], Chaparrita Blunt, [facility]   Transplant LINDA CEDENO, RN, Earlene Hernandez, ROB, Rosie Oliveira, RN, Shaina Mora, RN, Chaparrita Manjarrez, RN, Ute Ríos, NP, Varsha Ellison, RN, Marnie Elder, ROB, Debra Bender, ROB, Graham Sung MD   Transplant Surgery Graham Sung MD       Transplant Eligibility: Irreversible chronic kidney disease treated w/dialysis or expected need for dialysis    Committee Review Decision: Needs Re-presentation    Relative Contraindications: Other, neuropsych eval results, social support    Absolute Contraindications:     Committee Chair Graham Sung MD verbally attested to the committee's decision.    Committee Discussion Details: Patient was originally evaluated in October of 2023.  At that time, the committee determined that he needed neuropsych testing before moving forward with his evaluation.  This was performed and the note is in the chart from Mr. Roa.  Several concerning pieces to this evaluation.  This evaluation was discussed thoroughly and with input from , it was decided that Dax will be a candidate for kidney transplant only if he can put into place housing for one full year post transplant in a place such as a group home where he can be supervised and his medications can be given to him as directed. Patient  will be called with this decision and letter will be sent.

## 2024-02-15 NOTE — TELEPHONE ENCOUNTER
Called Dax's dialysis unit at this time to inform them of his decision to close him evaluation with us and come back in a year when he feels he has his feet under him a bit more.  Unable to reach them but did leave a VM regarding this. Will end his evaluation at this time.

## 2024-02-20 ENCOUNTER — OFFICE VISIT (OUTPATIENT)
Dept: PODIATRY | Facility: CLINIC | Age: 34
End: 2024-02-20
Attending: FAMILY MEDICINE
Payer: MEDICARE

## 2024-02-20 DIAGNOSIS — B35.3 TINEA PEDIS OF BOTH FEET: ICD-10-CM

## 2024-02-20 DIAGNOSIS — B35.1 ONYCHOMYCOSIS: Primary | ICD-10-CM

## 2024-02-20 DIAGNOSIS — G60.8 PERIPHERAL SENSORY NEUROPATHY: ICD-10-CM

## 2024-02-20 DIAGNOSIS — Z74.09 IMMOBILITY: ICD-10-CM

## 2024-02-20 DIAGNOSIS — Q66.70 CAVUS DEFORMITY OF FOOT: ICD-10-CM

## 2024-02-20 DIAGNOSIS — R16.1 SPLENOMEGALY: ICD-10-CM

## 2024-02-20 DIAGNOSIS — Z11.59 ENCOUNTER FOR SCREENING FOR OTHER VIRAL DISEASES: ICD-10-CM

## 2024-02-20 DIAGNOSIS — B35.1 FUNGAL INFECTION OF NAIL: ICD-10-CM

## 2024-02-20 DIAGNOSIS — K74.60 CIRRHOSIS OF LIVER (H): Primary | ICD-10-CM

## 2024-02-20 PROCEDURE — 11720 DEBRIDE NAIL 1-5: CPT | Performed by: PODIATRIST

## 2024-02-20 PROCEDURE — 99204 OFFICE O/P NEW MOD 45 MIN: CPT | Mod: 25 | Performed by: PODIATRIST

## 2024-02-20 RX ORDER — CICLOPIROX OLAMINE 7.7 MG/G
CREAM TOPICAL 2 TIMES DAILY
Qty: 90 G | Refills: 5 | Status: SHIPPED | OUTPATIENT
Start: 2024-02-20

## 2024-02-20 NOTE — NURSING NOTE
Dax Villareal's chief complaint for this visit includes:  Chief Complaint   Patient presents with    Consult     Have numbing feeling in half the foot in both, toe nail trim     PCP: Kole Gomez    Referring Provider:  Kole Gomez MD  [address] [place], [place] [id number]    There were no vitals taken for this visit.  Data Unavailable     Do you need any medication refills at today's visit? NO    No Known Allergies    Tegan Bueno LPN

## 2024-02-20 NOTE — PROGRESS NOTES
Past Medical History:   Diagnosis Date    ADHD 2008    Alcohol abuse, in remission     Alcoholic cirrhosis of liver with ascites (H)     Anemia in chronic kidney disease     Chronic pain     Current smoker     End stage renal disease (H)     Hypotension, unspecified hypotension type     Malnutrition (H24)     Metabolic acidosis     Necrotizing pancreatitis     Pancreatic insufficiency     Pericarditis     Recurrent pleural effusion      Patient Active Problem List   Diagnosis    Necrotizing pancreatitis    ESRD (end stage renal disease) on dialysis (H)    Cirrhosis of liver (H)    Muscle weakness    Contracture of joint of foot, unspecified laterality    DIMITRI (generalized anxiety disorder)    Alcohol abuse, in remission    Chronic pain    Pancreatic insufficiency    Anemia in chronic kidney disease    Acquired bilateral pes cavus     Past Surgical History:   Procedure Laterality Date    AV FISTULA PLACEMENT, BRACHIOCEPHALIC Left 05/11/2022    [facility]    COMBINED ESOPHAGOSCOPY, GASTROSCOPY, DUODENOSCOPY (EGD), TISSUE APPOSI N/A 09/20/2021    Procedure: ESOPHAGOGASTRODUODENOSCOPY WITH SUTURE FISTULA CLOSURE, argon plasma coagulation;  Surgeon: Jose Quigley MD;  [facility]    ENDOSCOPIC INSERTION TUBE GASTROSTOMY N/A 07/13/2021    Procedure: Upper endoscopy, INSERTION, PEG-J TUBE and Gastropexy;  Surgeon: Rayshawn Will MD;  [facility]    ENDOSCOPIC INSERTION TUBE GASTROSTOMY  09/13/2021    Procedure: PEG/J PLACEMENT.;  Surgeon: Rayshawn Will MD;  [facility]    ENDOSCOPIC ULTRASOUND UPPER GASTROINTESTINAL TRACT (GI) N/A 07/21/2021    Procedure: ENDOSCOPIC ULTRASOUND, ESOPHAGOSCOPY / UPPER GASTROINTESTINAL TRACT (GI) with cyst gastrostomy, dilation;  Surgeon: Guru Yecenia Chacko MD;  [facility]    ENDOSCOPIC ULTRASOUND, ESOPHAGOSCOPY, GASTROSCOPY, DUODENOSCOPY (EGD), NECROSECTOMY N/A 08/11/2021    Procedure: ESOPHAGOGASTRODUODENOSCOPY (EGD) with necrosectomy,  "stent exchange.;  Surgeon: Amadou Beasley MD;  [facility]    ESOPHAGOSCOPY, GASTROSCOPY, DUODENOSCOPY (EGD), COMBINED N/A 07/28/2021    Procedure: ESOPHAGOGASTRODUODENOSCOPY (EGD), gastrostomy apposition, Necrosectomy, stent exchange.;  Surgeon: Rayshawn Will MD;  [facility]    ESOPHAGOSCOPY, GASTROSCOPY, DUODENOSCOPY (EGD), COMBINED N/A 08/06/2021    Procedure: ESOPHAGOGASTRODUODENOSCOPY (EGD) with necrosectomy, and stents exchanged;  Surgeon: Jose Quigley MD;  [facility]    ESOPHAGOSCOPY, GASTROSCOPY, DUODENOSCOPY (EGD), COMBINED Left 09/13/2021    Procedure: ESOPHAGOGASTRODUODENOSCOPY (EGD), GASTROSTOMY TUBE REMOVAL, FISTULAR CLOSURE ENDOSCOPIC.;  Surgeon: Rayshawn Will MD;  [facility]    IR ABSCESS TUBE CHANGE  07/14/2021    IR NG TUBE PLACEMENT REQ RAD & FLUORO  10/26/2021    IR PARACENTESIS  07/21/2021    IR SINOGRAM INJECTION DIAGNOSTIC  08/25/2021    IR THORACENTESIS  02/05/2022    ORTHOPEDIC SURGERY Right     fracture repair    REPLACE GASTROSTOMY TUBE, PERCUTANEOUS N/A 07/28/2021    Procedure: gastro-jejunostomy tube exchange;  Surgeon: Rayshawn Will MD;  [facility]     Social History     Socioeconomic History    Marital status: Single     Spouse name: Not on file    Number of children: Not on file    Years of education: Not on file    Highest education level: Not on file   Occupational History    Not on file   Tobacco Use    Smoking status: Some Days     Packs/day: .25     Types: Cigarettes     Passive exposure: Current    Smokeless tobacco: Never    Tobacco comments:     8/24/2021 Patient did not want to discuss quitting but would like 4 mg lozenge to use during admission. Patient also accepted \"Quitting for Good\" workbook   Vaping Use    Vaping Use: Never used    Passive vaping exposure: Yes   Substance and Sexual Activity    Alcohol use: Not Currently     Comment: \"no alcohol for over 1 year ago\" per pt on 5/12/22    Drug use: No    Sexual " activity: Not Currently     Partners: Female   Other Topics Concern    Not on file   Social History Narrative    Not on file     Social Determinants of Health     Financial Resource Strain: Low Risk  (11/21/2023)    Financial Resource Strain     Within the past 12 months, have you or your family members you live with been unable to get utilities (heat, electricity) when it was really needed?: No   Recent Concern: Financial Resource Strain - Medium Risk (9/1/2023)    Overall Financial Resource Strain (CARDIA)     Difficulty of Paying Living Expenses: Somewhat hard   Food Insecurity: High Risk (11/21/2023)    Food Insecurity     Within the past 12 months, did you worry that your food would run out before you got money to buy more?: Yes     Within the past 12 months, did the food you bought just not last and you didn t have money to get more?: No   Transportation Needs: High Risk (11/21/2023)    Transportation Needs     Within the past 12 months, has lack of transportation kept you from medical appointments, getting your medicines, non-medical meetings or appointments, work, or from getting things that you need?: Yes   Physical Activity: Patient Declined (9/1/2023)    Exercise Vital Sign     Days of Exercise per Week: Patient declined     Minutes of Exercise per Session: Patient declined   Recent Concern: Physical Activity - Inactive (6/5/2023)    Exercise Vital Sign     Days of Exercise per Week: 0 days     Minutes of Exercise per Session: 0 min   Stress: Stress Concern Present (9/1/2023)    [employer] - Occupational Stress Questionnaire     Feeling of Stress : Rather much   Social Connections: Unknown (9/1/2023)    Social Connection and Isolation Panel [NHANES]     Frequency of Communication with Friends and Family: Twice a week     Frequency of Social Gatherings with Friends and Family: Patient declined     Attends [employer] Services: Patient declined     Active Member of Clubs or  Organizations: No     Attends Club or Organization Meetings: Never     Marital Status: Patient declined   Interpersonal Safety: Low Risk  (1/30/2024)    Interpersonal Safety     Do you feel physically and emotionally safe where you currently live?: Yes     Within the past 12 months, have you been hit, slapped, kicked or otherwise physically hurt by someone?: No     Within the past 12 months, have you been humiliated or emotionally abused in other ways by your partner or ex-partner?: No   Housing Stability: High Risk (11/21/2023)    Housing Stability     Do you have housing? : Yes     Are you worried about losing your housing?: Yes     Family History   Problem Relation Age of Onset    Attention Deficit Disorder Mother     Alcoholism Mother     Cancer Mother     Alcoholism Father     Alcoholism Sister     Alcoholism Brother            Subjective findings- 33-year-old presents in his electric chair for foot cares.  Relates his primary physician sent him to clinic for evaluation management.  Relates his big toenails hurt and he has a difficult time cutting them, relates no injuries, relates to no specific treatment, relates he is not using lotion.  Also relates he has a hard time standing and walking and he does get some pain in his feet when he tries to stand, but mostly he is scared he is going to fall because his legs feel weak.  Relates he is not using any type of orthotic or brace.  Relates he gets numbness and tingling in his 3 through 5 toes on both feet, relates he has not seen neurology in the past.    Objective findings- DP and PT are 2 out of 4 bilaterally.  Has a cavus foot type bilaterally, has dorsally contracted digits 1 through 5 bilaterally, has decreased ankle joint dorsiflexion bilaterally, no gross tendon voids bilaterally.  Has dystrophic brittle nails with mild discoloration thickening and dystrophy and brittleness to differing degrees 1 through 5 bilaterally to differing degrees.  Has dry scaly  skin in moccasin pattern bilaterally.  There is no erythema, no drainage, no odor, no calor, no edema, no pain on palpation bilaterally.  He has generalized decreased range of motion bilateral feet.  Has mild hyperkeratotic tissue buildup plantar fifth MPJ bilaterally.  Sharp/dull is decreased with 5.07 Symes Mikaela monofilament on the plantar third and fifth MPJ areas bilaterally, deep tendon reflexes are intact bilaterally, proprioception is intact bilaterally.    Assessment and plan- Onychomycosis and Onychauxis, cavus foot, immobility with some muscle weakness, peripheral sensory Neuropathy.  Diagnosis and treatment options discussed with the patient.  All the toenails were debrided or reduced bilaterally upon consent.  Prescription for Loprox cream given use discussed with the patient.  Prescription for physical therapy given use discussed with the patient.  AFOs and extra-depth shoes ordered and patient is given the phone number address orthotics and prosthetics lab to get these and use discussed with the patient.  Referral to Neurology given use discussed with the patient.  Previous notes reviewed.  Return to clinic and see me in 2 months.                  Moderate level of medical decision making.

## 2024-02-20 NOTE — LETTER
2/20/2024         RE: Dax Villareal  [address]  [place] [address]        Dear Colleague,    Thank you for referring your patient, Dax Villareal, to the [facility]. Please see a copy of my visit note below.    Past Medical History:   Diagnosis Date     ADHD 2008     Alcohol abuse, in remission      Alcoholic cirrhosis of liver with ascites (H)      Anemia in chronic kidney disease      Chronic pain      Current smoker      End stage renal disease (H)      Hypotension, unspecified hypotension type      Malnutrition (H24)      Metabolic acidosis      Necrotizing pancreatitis      Pancreatic insufficiency      Pericarditis      Recurrent pleural effusion      Patient Active Problem List   Diagnosis     Necrotizing pancreatitis     ESRD (end stage renal disease) on dialysis (H)     Cirrhosis of liver (H)     Muscle weakness     Contracture of joint of foot, unspecified laterality     DIMITRI (generalized anxiety disorder)     Alcohol abuse, in remission     Chronic pain     Pancreatic insufficiency     Anemia in chronic kidney disease     Acquired bilateral pes cavus     Past Surgical History:   Procedure Laterality Date     AV FISTULA PLACEMENT, BRACHIOCEPHALIC Left 05/11/2022    [facility]     COMBINED ESOPHAGOSCOPY, GASTROSCOPY, DUODENOSCOPY (EGD), TISSUE APPOSI N/A 09/20/2021    Procedure: ESOPHAGOGASTRODUODENOSCOPY WITH SUTURE FISTULA CLOSURE, argon plasma coagulation;  Surgeon: Jose Quigley MD;  [facility]     ENDOSCOPIC INSERTION TUBE GASTROSTOMY N/A 07/13/2021    Procedure: Upper endoscopy, INSERTION, PEG-J TUBE and Gastropexy;  Surgeon: Rayshawn Will MD;  [facility]     ENDOSCOPIC INSERTION TUBE GASTROSTOMY  09/13/2021    Procedure: PEG/J PLACEMENT.;  Surgeon: Rayshawn Will MD;  [facility]     ENDOSCOPIC ULTRASOUND UPPER GASTROINTESTINAL TRACT (GI) N/A 07/21/2021    Procedure: ENDOSCOPIC ULTRASOUND, ESOPHAGOSCOPY / UPPER  GASTROINTESTINAL TRACT (GI) with cyst gastrostomy, dilation;  Surgeon: Guru Yecenia Chacko MD;  [facility]     ENDOSCOPIC ULTRASOUND, ESOPHAGOSCOPY, GASTROSCOPY, DUODENOSCOPY (EGD), NECROSECTOMY N/A 08/11/2021    Procedure: ESOPHAGOGASTRODUODENOSCOPY (EGD) with necrosectomy, stent exchange.;  Surgeon: Amadou Beasley MD;  [facility]     ESOPHAGOSCOPY, GASTROSCOPY, DUODENOSCOPY (EGD), COMBINED N/A 07/28/2021    Procedure: ESOPHAGOGASTRODUODENOSCOPY (EGD), gastrostomy apposition, Necrosectomy, stent exchange.;  Surgeon: Rayshawn Will MD;  [facility]     ESOPHAGOSCOPY, GASTROSCOPY, DUODENOSCOPY (EGD), COMBINED N/A 08/06/2021    Procedure: ESOPHAGOGASTRODUODENOSCOPY (EGD) with necrosectomy, and stents exchanged;  Surgeon: Jose Quigley MD;  [facility]     ESOPHAGOSCOPY, GASTROSCOPY, DUODENOSCOPY (EGD), COMBINED Left 09/13/2021    Procedure: ESOPHAGOGASTRODUODENOSCOPY (EGD), GASTROSTOMY TUBE REMOVAL, FISTULAR CLOSURE ENDOSCOPIC.;  Surgeon: Rayshawn Will MD;  [facility]     IR ABSCESS TUBE CHANGE  07/14/2021     IR NG TUBE PLACEMENT REQ RAD & FLUORO  10/26/2021     IR PARACENTESIS  07/21/2021     IR SINOGRAM INJECTION DIAGNOSTIC  08/25/2021     IR THORACENTESIS  02/05/2022     ORTHOPEDIC SURGERY Right     fracture repair     REPLACE GASTROSTOMY TUBE, PERCUTANEOUS N/A 07/28/2021    Procedure: gastro-jejunostomy tube exchange;  Surgeon: Rayshawn Will MD;  [facility]     Social History     Socioeconomic History     Marital status: Single     Spouse name: Not on file     Number of children: Not on file     Years of education: Not on file     Highest education level: Not on file   Occupational History     Not on file   Tobacco Use     Smoking status: Some Days     Packs/day: .25     Types: Cigarettes     Passive exposure: Current     Smokeless tobacco: Never     Tobacco comments:     8/24/2021 Patient did not want to discuss quitting but would  "like 4 mg lozenge to use during admission. Patient also accepted \"Quitting for Good\" workbook   Vaping Use     Vaping Use: Never used     Passive vaping exposure: Yes   Substance and Sexual Activity     Alcohol use: Not Currently     Comment: \"no alcohol for over 1 year ago\" per pt on 5/12/22     Drug use: No     Sexual activity: Not Currently     Partners: Female   Other Topics Concern     Not on file   Social History Narrative     Not on file     Social Determinants of Health     Financial Resource Strain: Low Risk  (11/21/2023)    Financial Resource Strain      Within the past 12 months, have you or your family members you live with been unable to get utilities (heat, electricity) when it was really needed?: No   Recent Concern: Financial Resource Strain - Medium Risk (9/1/2023)    Overall Financial Resource Strain (CARDIA)      Difficulty of Paying Living Expenses: Somewhat hard   Food Insecurity: High Risk (11/21/2023)    Food Insecurity      Within the past 12 months, did you worry that your food would run out before you got money to buy more?: Yes      Within the past 12 months, did the food you bought just not last and you didn t have money to get more?: No   Transportation Needs: High Risk (11/21/2023)    Transportation Needs      Within the past 12 months, has lack of transportation kept you from medical appointments, getting your medicines, non-medical meetings or appointments, work, or from getting things that you need?: Yes   Physical Activity: Patient Declined (9/1/2023)    Exercise Vital Sign      Days of Exercise per Week: Patient declined      Minutes of Exercise per Session: Patient declined   Recent Concern: Physical Activity - Inactive (6/5/2023)    Exercise Vital Sign      Days of Exercise per Week: 0 days      Minutes of Exercise per Session: 0 min   Stress: Stress Concern Present (9/1/2023)    [employer] - Occupational Stress Questionnaire      Feeling of Stress : " Rather much   Social Connections: Unknown (9/1/2023)    Social Connection and Isolation Panel [NHANES]      Frequency of Communication with Friends and Family: Twice a week      Frequency of Social Gatherings with Friends and Family: Patient declined      Attends Baptism Services: Patient declined      Active Member of Clubs or Organizations: No      Attends Club or Organization Meetings: Never      Marital Status: Patient declined   Interpersonal Safety: Low Risk  (1/30/2024)    Interpersonal Safety      Do you feel physically and emotionally safe where you currently live?: Yes      Within the past 12 months, have you been hit, slapped, kicked or otherwise physically hurt by someone?: No      Within the past 12 months, have you been humiliated or emotionally abused in other ways by your partner or ex-partner?: No   Housing Stability: High Risk (11/21/2023)    Housing Stability      Do you have housing? : Yes      Are you worried about losing your housing?: Yes     Family History   Problem Relation Age of Onset     Attention Deficit Disorder Mother      Alcoholism Mother      Cancer Mother      Alcoholism Father      Alcoholism Sister      Alcoholism Brother            Subjective findings- 33-year-old presents in his electric chair for foot cares.  Relates his primary physician sent him to clinic for evaluation management.  Relates his big toenails hurt and he has a difficult time cutting them, relates no injuries, relates to no specific treatment, relates he is not using lotion.  Also relates he has a hard time standing and walking and he does get some pain in his feet when he tries to stand, but mostly he is scared he is going to fall because his legs feel weak.  Relates he is not using any type of orthotic or brace.  Relates he gets numbness and tingling in his 3 through 5 toes on both feet, relates he has not seen neurology in the past.    Objective findings- DP and PT are 2 out of 4 bilaterally.  Has a cavus  foot type bilaterally, has dorsally contracted digits 1 through 5 bilaterally, has decreased ankle joint dorsiflexion bilaterally, no gross tendon voids bilaterally.  Has dystrophic brittle nails with mild discoloration thickening and dystrophy and brittleness to differing degrees 1 through 5 bilaterally to differing degrees.  Has dry scaly skin in moccasin pattern bilaterally.  There is no erythema, no drainage, no odor, no calor, no edema, no pain on palpation bilaterally.  He has generalized decreased range of motion bilateral feet.  Has mild hyperkeratotic tissue buildup plantar fifth MPJ bilaterally.  Sharp/dull is decreased with 5.07 Symes Mikaela monofilament on the plantar third and fifth MPJ areas bilaterally, deep tendon reflexes are intact bilaterally, proprioception is intact bilaterally.    Assessment and plan- Onychomycosis and Onychauxis, cavus foot, immobility with some muscle weakness, peripheral sensory Neuropathy.  Diagnosis and treatment options discussed with the patient.  All the toenails were debrided or reduced bilaterally upon consent.  Prescription for Loprox cream given use discussed with the patient.  Prescription for physical therapy given use discussed with the patient.  AFOs and extra-depth shoes ordered and patient is given the phone number address orthotics and prosthetics lab to get these and use discussed with the patient.  Referral to Neurology given use discussed with the patient.  Previous notes reviewed.  Return to clinic and see me in 2 months.                  Moderate level of medical decision making.      Again, thank you for allowing me to participate in the care of your patient.        Sincerely,        Dev Hill DPM

## 2024-02-26 ENCOUNTER — HOSPITAL ENCOUNTER (EMERGENCY)
Facility: CLINIC | Age: 34
Discharge: HOME | End: 2024-02-26
Attending: EMERGENCY MEDICINE | Admitting: EMERGENCY MEDICINE
Payer: MEDICARE

## 2024-02-26 ENCOUNTER — TELEPHONE (OUTPATIENT)
Dept: FAMILY MEDICINE | Facility: CLINIC | Age: 34
End: 2024-02-26

## 2024-02-26 ENCOUNTER — APPOINTMENT (OUTPATIENT)
Dept: GENERAL RADIOLOGY | Facility: CLINIC | Age: 34
End: 2024-02-26
Attending: EMERGENCY MEDICINE
Payer: MEDICARE

## 2024-02-26 VITALS
RESPIRATION RATE: 20 BRPM | HEART RATE: 109 BPM | SYSTOLIC BLOOD PRESSURE: 120 MMHG | OXYGEN SATURATION: 98 % | TEMPERATURE: 97.9 F | DIASTOLIC BLOOD PRESSURE: 79 MMHG

## 2024-02-26 DIAGNOSIS — Z76.89 ENCOUNTER FOR SOCIAL WORK INTERVENTION: ICD-10-CM

## 2024-02-26 DIAGNOSIS — F32.0 CURRENT MILD EPISODE OF MAJOR DEPRESSIVE DISORDER, UNSPECIFIED WHETHER RECURRENT (H): ICD-10-CM

## 2024-02-26 DIAGNOSIS — G89.29 OTHER CHRONIC PAIN: ICD-10-CM

## 2024-02-26 DIAGNOSIS — M24.576 CONTRACTURE OF JOINT OF FOOT, UNSPECIFIED LATERALITY: ICD-10-CM

## 2024-02-26 DIAGNOSIS — M62.81 MUSCLE WEAKNESS: ICD-10-CM

## 2024-02-26 DIAGNOSIS — M25.562 CHRONIC PAIN OF LEFT KNEE: ICD-10-CM

## 2024-02-26 DIAGNOSIS — M21.6X1 ACQUIRED BILATERAL PES CAVUS: ICD-10-CM

## 2024-02-26 DIAGNOSIS — K85.91 NECROTIZING PANCREATITIS: ICD-10-CM

## 2024-02-26 DIAGNOSIS — Z99.2 ESRD (END STAGE RENAL DISEASE) ON DIALYSIS (H): ICD-10-CM

## 2024-02-26 DIAGNOSIS — F10.11 ALCOHOL ABUSE, IN REMISSION: ICD-10-CM

## 2024-02-26 DIAGNOSIS — M21.6X2 ACQUIRED BILATERAL PES CAVUS: ICD-10-CM

## 2024-02-26 DIAGNOSIS — G89.29 CHRONIC PAIN OF LEFT KNEE: ICD-10-CM

## 2024-02-26 DIAGNOSIS — Z59.9 FINANCIAL DIFFICULTIES: ICD-10-CM

## 2024-02-26 DIAGNOSIS — K74.60 CIRRHOSIS OF LIVER WITHOUT ASCITES, UNSPECIFIED HEPATIC CIRRHOSIS TYPE (H): ICD-10-CM

## 2024-02-26 DIAGNOSIS — K86.89 PANCREATIC INSUFFICIENCY: ICD-10-CM

## 2024-02-26 DIAGNOSIS — F41.1 GAD (GENERALIZED ANXIETY DISORDER): ICD-10-CM

## 2024-02-26 DIAGNOSIS — N18.6 ESRD (END STAGE RENAL DISEASE) ON DIALYSIS (H): ICD-10-CM

## 2024-02-26 DIAGNOSIS — G89.4 CHRONIC PAIN SYNDROME: ICD-10-CM

## 2024-02-26 DIAGNOSIS — Z71.89 ENCOUNTER FOR COORDINATION OF COMPLEX CARE: ICD-10-CM

## 2024-02-26 LAB
ANION GAP SERPL CALCULATED.3IONS-SCNC: 11 MMOL/L (ref 7–15)
ATRIAL RATE - MUSE: 113 BPM
BASOPHILS # BLD AUTO: 0 10E3/UL (ref 0–0.2)
BASOPHILS NFR BLD AUTO: 1 %
BUN SERPL-MCNC: 32.9 MG/DL (ref 6–20)
CALCIUM SERPL-MCNC: 9.3 MG/DL (ref 8.6–10)
CHLORIDE SERPL-SCNC: 104 MMOL/L (ref 98–107)
CREAT SERPL-MCNC: 2.68 MG/DL (ref 0.67–1.17)
DEPRECATED HCO3 PLAS-SCNC: 23 MMOL/L (ref 22–29)
DIASTOLIC BLOOD PRESSURE - MUSE: NORMAL MMHG
EGFRCR SERPLBLD CKD-EPI 2021: 31 ML/MIN/1.73M2
EOSINOPHIL # BLD AUTO: 0.3 10E3/UL (ref 0–0.7)
EOSINOPHIL NFR BLD AUTO: 5 %
ERYTHROCYTE [DISTWIDTH] IN BLOOD BY AUTOMATED COUNT: 12.8 % (ref 10–15)
GLUCOSE SERPL-MCNC: 99 MG/DL (ref 70–99)
HCT VFR BLD AUTO: 38.3 % (ref 40–53)
HGB BLD-MCNC: 13 G/DL (ref 13.3–17.7)
IMM GRANULOCYTES # BLD: 0 10E3/UL
IMM GRANULOCYTES NFR BLD: 1 %
INTERPRETATION ECG - MUSE: NORMAL
LYMPHOCYTES # BLD AUTO: 1 10E3/UL (ref 0.8–5.3)
LYMPHOCYTES NFR BLD AUTO: 16 %
MCH RBC QN AUTO: 29.8 PG (ref 26.5–33)
MCHC RBC AUTO-ENTMCNC: 33.9 G/DL (ref 31.5–36.5)
MCV RBC AUTO: 88 FL (ref 78–100)
MONOCYTES # BLD AUTO: 0.4 10E3/UL (ref 0–1.3)
MONOCYTES NFR BLD AUTO: 7 %
NEUTROPHILS # BLD AUTO: 4.3 10E3/UL (ref 1.6–8.3)
NEUTROPHILS NFR BLD AUTO: 70 %
NRBC # BLD AUTO: 0 10E3/UL
NRBC BLD AUTO-RTO: 0 /100
P AXIS - MUSE: 44 DEGREES
PLATELET # BLD AUTO: 164 10E3/UL (ref 150–450)
POTASSIUM SERPL-SCNC: 3.6 MMOL/L (ref 3.4–5.3)
PR INTERVAL - MUSE: 162 MS
QRS DURATION - MUSE: 90 MS
QT - MUSE: 348 MS
QTC - MUSE: 477 MS
R AXIS - MUSE: -27 DEGREES
RBC # BLD AUTO: 4.36 10E6/UL (ref 4.4–5.9)
SODIUM SERPL-SCNC: 138 MMOL/L (ref 135–145)
SYSTOLIC BLOOD PRESSURE - MUSE: NORMAL MMHG
T AXIS - MUSE: 48 DEGREES
VENTRICULAR RATE- MUSE: 113 BPM
WBC # BLD AUTO: 6.1 10E3/UL (ref 4–11)

## 2024-02-26 PROCEDURE — 250N000013 HC RX MED GY IP 250 OP 250 PS 637: Performed by: EMERGENCY MEDICINE

## 2024-02-26 PROCEDURE — 73630 X-RAY EXAM OF FOOT: CPT | Mod: RT

## 2024-02-26 PROCEDURE — 93010 ELECTROCARDIOGRAM REPORT: CPT | Performed by: EMERGENCY MEDICINE

## 2024-02-26 PROCEDURE — 99284 EMERGENCY DEPT VISIT MOD MDM: CPT | Mod: 25 | Performed by: EMERGENCY MEDICINE

## 2024-02-26 PROCEDURE — 93005 ELECTROCARDIOGRAM TRACING: CPT | Performed by: EMERGENCY MEDICINE

## 2024-02-26 PROCEDURE — 99285 EMERGENCY DEPT VISIT HI MDM: CPT | Performed by: EMERGENCY MEDICINE

## 2024-02-26 PROCEDURE — 36415 COLL VENOUS BLD VENIPUNCTURE: CPT | Performed by: EMERGENCY MEDICINE

## 2024-02-26 PROCEDURE — 73630 X-RAY EXAM OF FOOT: CPT | Mod: 26 | Performed by: RADIOLOGY

## 2024-02-26 PROCEDURE — 73562 X-RAY EXAM OF KNEE 3: CPT | Mod: 26 | Performed by: RADIOLOGY

## 2024-02-26 PROCEDURE — 93005 ELECTROCARDIOGRAM TRACING: CPT | Mod: RTG

## 2024-02-26 PROCEDURE — 85025 COMPLETE CBC W/AUTO DIFF WBC: CPT | Performed by: EMERGENCY MEDICINE

## 2024-02-26 PROCEDURE — 82374 ASSAY BLOOD CARBON DIOXIDE: CPT | Performed by: EMERGENCY MEDICINE

## 2024-02-26 PROCEDURE — 73562 X-RAY EXAM OF KNEE 3: CPT | Mod: LT

## 2024-02-26 RX ORDER — HYDROXYZINE HYDROCHLORIDE 50 MG/1
50 TABLET, FILM COATED ORAL ONCE
Status: COMPLETED | OUTPATIENT
Start: 2024-02-26 | End: 2024-02-26

## 2024-02-26 RX ORDER — NORTRIPTYLINE HCL 25 MG
25 CAPSULE ORAL AT BEDTIME
Qty: 90 CAPSULE | Refills: 3 | Status: SHIPPED | OUTPATIENT
Start: 2024-02-26 | End: 2024-02-26

## 2024-02-26 RX ORDER — METHOCARBAMOL 500 MG/1
TABLET, FILM COATED ORAL
Qty: 34 TABLET | Refills: 0 | Status: SHIPPED | OUTPATIENT
Start: 2024-02-26 | End: 2024-03-02

## 2024-02-26 RX ORDER — ACETAMINOPHEN 325 MG/10.15ML
325 LIQUID ORAL EVERY 4 HOURS PRN
Status: DISCONTINUED | OUTPATIENT
Start: 2024-02-26 | End: 2024-02-26 | Stop reason: HOSPADM

## 2024-02-26 RX ORDER — CYCLOBENZAPRINE HCL 5 MG
10 TABLET ORAL ONCE
Status: DISCONTINUED | OUTPATIENT
Start: 2024-02-26 | End: 2024-02-26

## 2024-02-26 RX ORDER — MIDODRINE HYDROCHLORIDE 5 MG/1
10 TABLET ORAL ONCE
Status: COMPLETED | OUTPATIENT
Start: 2024-02-26 | End: 2024-02-26

## 2024-02-26 RX ORDER — ALPRAZOLAM 0.25 MG
0.5 TABLET ORAL ONCE
Status: COMPLETED | OUTPATIENT
Start: 2024-02-26 | End: 2024-02-26

## 2024-02-26 RX ORDER — METHOCARBAMOL 500 MG/1
1000 TABLET, FILM COATED ORAL ONCE
Status: COMPLETED | OUTPATIENT
Start: 2024-02-26 | End: 2024-02-26

## 2024-02-26 RX ORDER — PREGABALIN 75 MG/1
300 CAPSULE ORAL
Status: DISCONTINUED | OUTPATIENT
Start: 2024-02-26 | End: 2024-02-26 | Stop reason: HOSPADM

## 2024-02-26 RX ORDER — TIZANIDINE 2 MG/1
2 TABLET ORAL 3 TIMES DAILY
Status: DISCONTINUED | OUTPATIENT
Start: 2024-02-26 | End: 2024-02-26

## 2024-02-26 RX ORDER — DULOXETIN HYDROCHLORIDE 60 MG/1
60 CAPSULE, DELAYED RELEASE ORAL ONCE
Qty: 1 CAPSULE | Refills: 0 | Status: COMPLETED | OUTPATIENT
Start: 2024-02-26 | End: 2024-02-26

## 2024-02-26 RX ADMIN — ALPRAZOLAM 0.5 MG: 0.25 TABLET ORAL at 15:56

## 2024-02-26 RX ADMIN — DULOXETINE HYDROCHLORIDE 60 MG: 60 CAPSULE, DELAYED RELEASE ORAL at 17:25

## 2024-02-26 RX ADMIN — HYDROXYZINE HYDROCHLORIDE 50 MG: 50 TABLET, FILM COATED ORAL at 15:56

## 2024-02-26 RX ADMIN — METHOCARBAMOL 1000 MG: 500 TABLET ORAL at 15:56

## 2024-02-26 SDOH — ECONOMIC STABILITY - INCOME SECURITY: PROBLEM RELATED TO HOUSING AND ECONOMIC CIRCUMSTANCES, UNSPECIFIED: Z59.9

## 2024-02-26 ASSESSMENT — ACTIVITIES OF DAILY LIVING (ADL)
ADLS_ACUITY_SCORE: 41
ADLS_ACUITY_SCORE: 41
ADLS_ACUITY_SCORE: 43
ADLS_ACUITY_SCORE: 43
ADLS_ACUITY_SCORE: 41
DEPENDENT_IADLS:: CLEANING;COOKING;LAUNDRY;SHOPPING;MEAL PREPARATION;TRANSPORTATION

## 2024-02-26 ASSESSMENT — COLUMBIA-SUICIDE SEVERITY RATING SCALE - C-SSRS
6. HAVE YOU EVER DONE ANYTHING, STARTED TO DO ANYTHING, OR PREPARED TO DO ANYTHING TO END YOUR LIFE?: NO
2. HAVE YOU ACTUALLY HAD ANY THOUGHTS OF KILLING YOURSELF IN THE PAST MONTH?: NO
1. IN THE PAST MONTH, HAVE YOU WISHED YOU WERE DEAD OR WISHED YOU COULD GO TO SLEEP AND NOT WAKE UP?: NO

## 2024-02-26 NOTE — CONSULTS
Care Management Initial Consult & Discharge Note    General Information  Assessment completed with: Patient,    Type of CM/SW Visit: Initial Assessment    Primary Care Provider verified and updated as needed: Yes   Readmission within the last 30 days: no previous admission in last 30 days      Reason for Consult: discharge planning  Advance Care Planning: Advance Care Planning Reviewed: present on chart          Communication Assessment  Patient's communication style: spoken language (English or Bilingual)             Cognitive  Cognitive/Neuro/Behavioral: WDL                      Living Environment:   People in home: alone     Current living Arrangements: apartment      Able to return to prior arrangements: yes       Family/Social Support:  Care provided by: self  Provides care for: no one  Marital Status: Single  None          Description of Support System:    Not assessed       Current Resources:   Patient receiving home care services: No     Community Resources: Dialysis Services  Equipment currently used at home:    Supplies currently used at home:  (Not assessed)    Employment/Financial:  Employment Status: disabled        Financial Concerns: none   Referral to Financial Worker: No       Does the patient's insurance plan have a 3 day qualifying hospital stay waiver?  No    Lifestyle & Psychosocial Needs:  Social Determinants of Health     Food Insecurity: High Risk (11/21/2023)    Food Insecurity     Within the past 12 months, did you worry that your food would run out before you got money to buy more?: Yes     Within the past 12 months, did the food you bought just not last and you didn t have money to get more?: No   Depression: Not at risk (1/26/2024)    PHQ-2     PHQ-2 Score: 1   Housing Stability: High Risk (11/21/2023)    Housing Stability     Do you have housing? : Yes     Are you worried about losing your housing?: Yes   Tobacco Use: High Risk (1/30/2024)    Patient History     Smoking Tobacco Use:  Some Days     Smokeless Tobacco Use: Never     Passive Exposure: Current   Financial Resource Strain: Low Risk  (11/21/2023)    Financial Resource Strain     Within the past 12 months, have you or your family members you live with been unable to get utilities (heat, electricity) when it was really needed?: No   Recent Concern: Financial Resource Strain - Medium Risk (9/1/2023)    Overall Financial Resource Strain (CARDIA)     Difficulty of Paying Living Expenses: Somewhat hard   Alcohol Use: Not At Risk (6/5/2023)    AUDIT-C     Frequency of Alcohol Consumption: Never     Average Number of Drinks: Patient does not drink     Frequency of Binge Drinking: Never   Transportation Needs: High Risk (11/21/2023)    Transportation Needs     Within the past 12 months, has lack of transportation kept you from medical appointments, getting your medicines, non-medical meetings or appointments, work, or from getting things that you need?: Yes   Physical Activity: Patient Declined (9/1/2023)    Exercise Vital Sign     Days of Exercise per Week: Patient declined     Minutes of Exercise per Session: Patient declined   Recent Concern: Physical Activity - Inactive (6/5/2023)    Exercise Vital Sign     Days of Exercise per Week: 0 days     Minutes of Exercise per Session: 0 min   Interpersonal Safety: Low Risk  (1/30/2024)    Interpersonal Safety     Do you feel physically and emotionally safe where you currently live?: Yes     Within the past 12 months, have you been hit, slapped, kicked or otherwise physically hurt by someone?: No     Within the past 12 months, have you been humiliated or emotionally abused in other ways by your partner or ex-partner?: No   Stress: Stress Concern Present (9/1/2023)    [employer] - Occupational Stress Questionnaire     Feeling of Stress : Rather much   Social Connections: Unknown (9/1/2023)    Social Connection and Isolation Panel [NHANES]     Frequency of Communication  with Friends and Family: Twice a week     Frequency of Social Gatherings with Friends and Family: Patient declined     Attends Bahai Services: Patient declined     Active Member of Clubs or Organizations: No     Attends Club or Organization Meetings: Never     Marital Status: Patient declined       Functional Status:  Prior to admission patient needed assistance:   Dependent ADLs:: Independent, Wheelchair-independent (Can ambulate short distances without assistance)  Dependent IADLs:: Cleaning, Cooking, Laundry, Shopping, Meal Preparation, Transportation  Assesssment of Functional Status: At functional baseline    Mental Health Status:  Mental Health Status: Current Concern       Chemical Dependency Status:  Chemical Dependency Status: Current Concern             Values/Beliefs:  Spiritual, Cultural Beliefs, Bahai Practices, Values that affect care: no               Discharge Date: 02/26/2024       Discharge Disposition: Dialysis Services    Discharge Services:      Discharge DME: None    Discharge Transportation: agency    Private pay costs discussed: Not applicable    Does the patient's insurance plan have a 3 day qualifying hospital stay waiver?  No    PAS Confirmation Code: N/A  Patient/family educated on Medicare website which has current facility and service quality ratings: no    Education Provided on the Discharge Plan: Yes  Persons Notified of Discharge Plans: Pt; ED MD  Patient/Family in Agreement with the Plan: yes    Handoff Referral Completed: Yes    Additional Information:    Writer received curbside consult from ED MDJennifer. Pt reporting that he needs he help getting services in [facility] and he wants to piter previous facilities because he is not getting the care he needs.    Writer met with pt in ED consult room. Writer introduced self, discussed role and went over writer's availability.     Pt is very tangential and has a flurry of ideas he wants to express to writer. Pt was also difficult to  redirect at times and would sometimes stay on his tangent even with prompts to redirect him back to a more relevant topic. Pt is very vocal about how he feels like he has been wronged as well as the shortcomings of the various health facilities during COVID. Pt reports paperwork was lost when he discharged from the facility.     Pt reports he managed to get his paperwork together eventually and is able to live in his apartment. Pt repots he had 70 hours of PCA services, but one day the state stopped paying his friend who was serving as his PCA for about 1.5 months. Eventually pt reports his friend had to stop working as his PCA and take another job. Pt reports he does not know why his PCA services stopped paying his friend. Overall, pt requests assistance getting his services reestablished. Writer explained to pt that these services will take weeks to likely get re-established. Pt reports that he has been able to schedule his rides to and from  and he has been getting groceries himself.    Writer spoke with [facility]  who report that [place] not [place], which is likely why he lost his PCA services around January. Pt is already on the [place] for a renewal [place] and the [facility] will call him to schedule his intake appt in 1.5-2 months.     Writer spoke with MAI Lombardo at [facility] (# listed below). Uzma reports that pt has a history of taking on everything himself, refuse help from others, and then when he misses deadlines, he will report that no one is helping him. This appears to be in line with pt's current presenting situation. Uzma reports that they are planning a care conference for pt at the HD clinic on Friday.    MAHENDRA (Ph: [phone]) - Per , pt has been certified to call transportation companies directly to schedule his w/c rides.    Pt appears to be able to meet his needs in the community and has community  supports in place to help him out should he be agreeable to allowing their assistance. Pt is capable of still getting his needs met in the community should he return home.     Writer updated MD on pt's disposition plan. MD in agreement.    Writer met with pt in ED lobby. Pt disappointed in not being admitted, but agreeable to discharging home. Writer [place] with [place]. Pt keeps asking about how to find PCA to work for him. Writer attempted to explain several times that he does not appear to have active services since he does not have an active waiver. Pt is insisting on still having services, however, after writer explained several times, pt seemed to relent on having services. Pt reports he will set up his own ride home.     [facility]  [address] [place] [facility],   [place], [address]  P: [phone]  F: [phone]  [facility] [address] start time        ________________    RONNA Hernandez, [facility]  ED/Observation   [facility]  Phone: [phone]  Pager: [phone]  Fax: [phone]    On-call pager, [phone], 4:00pm to midnight    [place] [facility] [employer]    After hours Vocera [employer] and After Hours Voc[employer] [place]
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
## 2024-02-26 NOTE — DISCHARGE INSTRUCTIONS
Please make an appointment to follow up with Physical medicine and rehab, [facility] (phone: [phone]), and Pain Clinic (phone: [phone]) as soon as possible.      You were given a referral to Physical medicine and rehab, pain management, and primary care clinic. Someone should call you to set up this appointment, but please call the number listed above, if you do not hear from someone within 1-2 business days.     Take methocarbemol as directed for muscle spasm and pain.  Use capsaicin-menthol gel as needed for neuropathy and pain.    Follow up with your outpatient dialysis unit for dialysis as soon as possible.

## 2024-02-26 NOTE — TELEPHONE ENCOUNTER
Spoke with patient. He expresses that he has multiple concerns including a hyperextended knee which has been aggravated, turf toe, lacerations in his face that have been stitched up, dialysis, and multiple different appointments. Patient explains he is overwhelmed with all of the needs that he has, and would like to go somewhere where all of his needs may be addressed.    Advised to patient that going to the hospital advised if he has multiple urgent concerns that he would like to be addressed.     Patient verbalized understanding and has no further questions at this time. He requests to be transferred to [facility] to confirm that he will be able to receive dialysis treatment.     Routing message to Care Coordination as FYI - Patient last had follow-up on 1/25. Patient may benefit from additional care coordination.     DERRICK CandelariaN RN  [facility]

## 2024-02-26 NOTE — ED TRIAGE NOTES
Arrives w/c bound with a needs for dialysis, weight gain and L knee pain. Per patient missed dialysis today.     Triage Assessment (Adult)       Row Name 02/26/24 1115          Triage Assessment    Airway WDL WDL        Respiratory WDL    Respiratory WDL WDL        Skin Circulation/Temperature WDL    Skin Circulation/Temperature WDL WDL        Cardiac WDL    Cardiac WDL X;rhythm     Pulse Rate & Regularity tachycardic        Peripheral/Neurovascular WDL    Peripheral Neurovascular WDL WDL        Cognitive/Neuro/Behavioral WDL    Cognitive/Neuro/Behavioral WDL WDL

## 2024-02-27 ENCOUNTER — PATIENT OUTREACH (OUTPATIENT)
Dept: CARE COORDINATION | Facility: CLINIC | Age: 34
End: 2024-02-27
Payer: MEDICARE

## 2024-02-27 ASSESSMENT — ACTIVITIES OF DAILY LIVING (ADL): DEPENDENT_IADLS:: CLEANING;COOKING;LAUNDRY;SHOPPING;MEAL PREPARATION;TRANSPORTATION

## 2024-02-27 NOTE — PROGRESS NOTES
Clinic Care Coordination Contact    Situation: Patient chart reviewed by care coordinator.    Background: Patient was at [facility] ED yesterday for chronic pain and muscle weakness.      Assessment: Per chart review, a SW consult was done with patient in ED.  Per that consult.  Patient reported having previously lost his PCA.  [facility] spoke with [facility] who informed that [place].  Patient is on a wait list for a new assessment.    [facility] spoke with SW, Uzma Sethi, at [facility], who informed that patient has a history of taking on all himself, refusing assistance from others and then reports not one wiil assist him.  Uzma is planning a care conference in the near future with patient     Per above chart note, patient has been certified for medical transport with [facility] (Ph: [phone])    Per [facility] note, Patient was hopeful to be admitted.  [facility] provided [place] with [place] on [place].  Patient continued to inquire about how to find PCA to work for him.  [facility] attempted to explain several times that patient does not currently have active services since he does not have an active waiver. Patient was insistent on still having services, however, after writer explained several times, patient seemed to relent on having services.    Plan/Recommendations: Based on patient having other case management with [facility] as noted above, SW will close to JEFFERY Shirley, DARNELL, MSW   [facility]  Care Coordination  [facility] and [facility]  [phone]  2/27/2024 12:09 PM

## 2024-02-29 ENCOUNTER — THERAPY VISIT (OUTPATIENT)
Dept: PHYSICAL THERAPY | Facility: CLINIC | Age: 34
End: 2024-02-29
Attending: PODIATRIST
Payer: MEDICARE

## 2024-02-29 DIAGNOSIS — M79.671 CHRONIC PAIN OF BOTH FEET: Primary | ICD-10-CM

## 2024-02-29 DIAGNOSIS — M79.672 CHRONIC PAIN OF BOTH FEET: Primary | ICD-10-CM

## 2024-02-29 DIAGNOSIS — G89.29 CHRONIC PAIN OF BOTH FEET: Primary | ICD-10-CM

## 2024-02-29 PROCEDURE — 97163 PT EVAL HIGH COMPLEX 45 MIN: CPT | Mod: GP | Performed by: PHYSICAL THERAPIST

## 2024-02-29 PROCEDURE — 97140 MANUAL THERAPY 1/> REGIONS: CPT | Mod: GP | Performed by: PHYSICAL THERAPIST

## 2024-02-29 PROCEDURE — 97110 THERAPEUTIC EXERCISES: CPT | Mod: GP | Performed by: PHYSICAL THERAPIST

## 2024-02-29 NOTE — PROGRESS NOTES
PHYSICAL THERAPY EVALUATION  Type of Visit: Evaluation    See electronic medical record for Abuse and Falls Screening details.    Subjective       Presenting condition or subjective complaint: Was in hospital and wehn he was released, nothing was set up for him.  Is now trying to get PCA in home.   Feels like turf toe bilat with nueropathy in both feet.  Tendons feel tight in feet and ankles.  Has trouble with any standing and walking.  Having time issues with everything that he has to do with his feet and other activities in house and life in general.  Sx started years ago and has noted many issues with obtaining care and transfer of care.  Was in hospital and nursing home years back for several months.  History of chronic pain.  Has dialysis 3x/ week currently.    Date of onset: 02/28/21 (approx)    Relevant medical history: Depression; History of fractures; Incontinence; Kidney disease; Overweight; Pain at night or rest; Significant weakness; Smoking   Dates & types of surgery:      Prior diagnostic imaging/testing results: X-ray     Prior therapy history for the same diagnosis, illness or injury: Yes one session    Prior Level of Function  Transfers: Independent  Ambulation: Assistive equipment  ADL:  is currently trying to get PCA  IADL:     Living Environment  Social support: Alone   Type of home: Apartment/condo   Stairs to enter the home: No       Ramp: No   Stairs inside the home: No       Help at home: Self Cares (home health aide/personal care attendant, family, etc)  Equipment owned: Power wheelchair or scooter; Walker with wheels     Employment: No    Hobbies/Interests: nikolai, cooking, movies    Patient goals for therapy: walk    Pain assessment:      Objective   FOOT/ANKLE EVALUATION  PAIN: Pain Level at Rest: 0/10  Pain Level with Use: 6/10  Pain Location: ankle and foot   Pain Quality: Pressure, Shooting, and Stabbing  Pain Frequency: constant  Pain is Worst: daytime or nighttime  Pain is  Exacerbated By: weight bearing, walking;  occasional random pain   Pain is Relieved By: rest  Pain Progression: Unchanged  INTEGUMENTARY (edema, incisions): WNL  POSTURE:  cavus with hammer toes bilat  GAIT:   Weightbearing Status: WBAT  Assistive Device(s):  walker at home and power scooter out side  Gait Deviations:   BALANCE/PROPRIOCEPTION: Single Leg Stance Eyes Open (seconds): ,<2 bilat  WEIGHT BEARING ALIGNMENT: Foot/Ankle WB Alignment:Pes cavus L, Hammer toe L, Pes cavus R, Hammer toe R  NON-WEIGHTBEARING ALIGNMENT:    ROM:  DF to neutral bilat;  very limited toe extension throughout all toes    STRENGTH:  4+/5 strength bilat  FLEXIBILITY:   SPECIAL TESTS:   FUNCTIONAL TESTS:   PALPATION:   JOINT MOBILITY:  hypo mobility throughout entire foot bilat    Assessment & Plan   CLINICAL IMPRESSIONS  Medical Diagnosis: cavus deformity bilat feet    Treatment Diagnosis: bilat foot pain   Impression/Assessment: Patient is a 33 year old male with bilat feet complaints.  The following significant findings have been identified: Pain, Decreased ROM/flexibility, Decreased joint mobility, Impaired gait, Impaired muscle performance, and Decreased activity tolerance. These impairments interfere with their ability to perform self care tasks, recreational activities, household chores, household mobility, and community mobility as compared to previous level of function.     Clinical Decision Making (Complexity):  Clinical Presentation: Unstable/Unpredictable   Clinical Presentation Rationale: based on medical and personal factors listed in PT evaluation  Clinical Decision Making (Complexity): High complexity    PLAN OF CARE  Treatment Interventions:  Interventions: Gait Training, Manual Therapy, Neuromuscular Re-education, Therapeutic Activity, Therapeutic Exercise    Long Term Goals     PT Goal 1  Goal Identifier: feet  Goal Description: Pt able to ambulate in apartment without assistive device  Rationale: to maximize safety  and independence with performance of ADLs and functional tasks;to maximize safety and independence within the home;to maximize safety and independence within the community;to maximize safety and independence with transportation;to maximize safety and independence with self cares  Target Date: 05/29/24      Frequency of Treatment: 2x/ month  Duration of Treatment: 3 dionisio    Recommended Referrals to Other Professionals:   Education Assessment:   Learner/Method: Patient;Demonstration;Pictures/Video    Risks and benefits of evaluation/treatment have been explained.   Patient/Family/caregiver agrees with Plan of Care.     Evaluation Time:     PT Eval, High Complexity Minutes (11079): 20       Signing Clinician: Geoff Reed, PT      [facility]                                                                                   OUTPATIENT PHYSICAL THERAPY      PLAN OF TREATMENT FOR OUTPATIENT REHABILITATION   Patient's Last Name, First Name, ABELABI  MonoDax RIVAS YOB: 1990   Provider's Name   [facility]   Medical Record No.  [id number]     Onset Date: 02/28/21 (approx)  Start of Care Date: 02/29/24     Medical Diagnosis:  cavus deformity bilat feet      PT Treatment Diagnosis:  bilat foot pain Plan of Treatment  Frequency/Duration: 2x/ month/ 3 dionisio    Certification date from 02/29/24 to 05/29/24         See note for plan of treatment details and functional goals     Geoff Reed, PT                         I CERTIFY THE NEED FOR THESE SERVICES FURNISHED UNDER        THIS PLAN OF TREATMENT AND WHILE UNDER MY CARE     (Physician attestation of this document indicates review and certification of the therapy plan).              Referring Provider:  Dev Hill    Initial Assessment  See Epic Evaluation- Start of Care Date: 02/29/24

## 2024-03-07 ENCOUNTER — THERAPY VISIT (OUTPATIENT)
Dept: PHYSICAL THERAPY | Facility: CLINIC | Age: 34
End: 2024-03-07
Attending: PODIATRIST
Payer: MEDICARE

## 2024-03-07 DIAGNOSIS — M79.672 CHRONIC PAIN OF BOTH FEET: Primary | ICD-10-CM

## 2024-03-07 DIAGNOSIS — G89.29 CHRONIC PAIN OF BOTH FEET: Primary | ICD-10-CM

## 2024-03-07 DIAGNOSIS — M79.671 CHRONIC PAIN OF BOTH FEET: Primary | ICD-10-CM

## 2024-03-07 PROCEDURE — 97110 THERAPEUTIC EXERCISES: CPT | Mod: GP | Performed by: PHYSICAL THERAPIST

## 2024-03-15 ENCOUNTER — TELEPHONE (OUTPATIENT)
Dept: FAMILY MEDICINE | Facility: CLINIC | Age: 34
End: 2024-03-15
Payer: MEDICARE

## 2024-03-15 DIAGNOSIS — F41.1 GAD (GENERALIZED ANXIETY DISORDER): ICD-10-CM

## 2024-03-15 NOTE — TELEPHONE ENCOUNTER
Routing message to provider.    Patient has an appointment scheduled on 4/2/24 at 1:40 pm for 20 mins. He would like a double appointment because he has lots of concerns.    I have put a hold on the appointment after if you would like a longer time frame.    Christine M Klisch, RN

## 2024-03-15 NOTE — TELEPHONE ENCOUNTER
"Dr. Gomez and [facility] Nurses,     Patient called regarding his anxiety and his ADHD medication.     Per patient he has been struggling with his health and has been holding off on his kidney transplant due to insurance and housing issues. Per patient he did receive medical marijuana but he is anxious to upset this and has not yet used this due to losing some of his rights if he uses it.     Per patient he used to use xanax, valium, and hydroxyzine. He received a psych referral during recent ED stay but has not heard from them yet.    Asking if you can help with anxiety medications until he is able to set up psychiatry. Per patient, \" I don't understand why I can't have xanax I think the MAGGY can go f**k themselves I am dying and I want to be comfortable as I can be during this process\".     Patient asked writer to check and see if [facility] has med in stock (adderall 30 mg XR) ? Attempted to call and went to voice mail (odd). RN's can you please go huddle with pharm? Patient hoping to get med today if possible, otherwise, will have to wait until Monday to get from his current pharmacy.     Please advise.     Thanks,  ROB Mcmanus  [facility]     "

## 2024-03-16 RX ORDER — HYDROXYZINE HYDROCHLORIDE 25 MG/1
25 TABLET, FILM COATED ORAL 3 TIMES DAILY PRN
Qty: 90 TABLET | Refills: 0 | Status: SHIPPED | OUTPATIENT
Start: 2024-03-16 | End: 2024-06-17

## 2024-03-21 ENCOUNTER — VIRTUAL VISIT (OUTPATIENT)
Dept: PSYCHIATRY | Facility: CLINIC | Age: 34
End: 2024-03-21
Payer: MEDICARE

## 2024-03-21 DIAGNOSIS — F41.1 GAD (GENERALIZED ANXIETY DISORDER): ICD-10-CM

## 2024-03-21 PROCEDURE — 99213 OFFICE O/P EST LOW 20 MIN: CPT | Mod: 95 | Performed by: NURSE PRACTITIONER

## 2024-03-21 RX ORDER — DULOXETIN HYDROCHLORIDE 60 MG/1
60 CAPSULE, DELAYED RELEASE ORAL DAILY
Qty: 90 CAPSULE | Refills: 0 | Status: SHIPPED | OUTPATIENT
Start: 2024-03-21

## 2024-03-21 ASSESSMENT — ANXIETY QUESTIONNAIRES
GAD7 TOTAL SCORE: 9
3. WORRYING TOO MUCH ABOUT DIFFERENT THINGS: SEVERAL DAYS
2. NOT BEING ABLE TO STOP OR CONTROL WORRYING: NEARLY EVERY DAY
7. FEELING AFRAID AS IF SOMETHING AWFUL MIGHT HAPPEN: SEVERAL DAYS
GAD7 TOTAL SCORE: 9
7. FEELING AFRAID AS IF SOMETHING AWFUL MIGHT HAPPEN: SEVERAL DAYS
6. BECOMING EASILY ANNOYED OR IRRITABLE: NOT AT ALL
IF YOU CHECKED OFF ANY PROBLEMS ON THIS QUESTIONNAIRE, HOW DIFFICULT HAVE THESE PROBLEMS MADE IT FOR YOU TO DO YOUR WORK, TAKE CARE OF THINGS AT HOME, OR GET ALONG WITH OTHER PEOPLE: EXTREMELY DIFFICULT
5. BEING SO RESTLESS THAT IT IS HARD TO SIT STILL: NOT AT ALL
1. FEELING NERVOUS, ANXIOUS, OR ON EDGE: NEARLY EVERY DAY
8. IF YOU CHECKED OFF ANY PROBLEMS, HOW DIFFICULT HAVE THESE MADE IT FOR YOU TO DO YOUR WORK, TAKE CARE OF THINGS AT HOME, OR GET ALONG WITH OTHER PEOPLE?: EXTREMELY DIFFICULT
4. TROUBLE RELAXING: SEVERAL DAYS
GAD7 TOTAL SCORE: 9

## 2024-03-21 ASSESSMENT — PAIN SCALES - GENERAL: PAINLEVEL: MODERATE PAIN (4)

## 2024-03-21 NOTE — PROGRESS NOTES
"  The patient has been notified of following:      \"This virtual  visit will be conducted via a call between you and your physician/provider. We have found that certain health care needs can be provided without the need for a physical exam.  This service lets us provide the care you need virtually/via video   If a prescription is necessary we can send it directly to your pharmacy.  If lab work is needed we can place an order for that and you can then stop by our lab to have the test done at a later time.     Virtual/Video visits are billed at different rates depending on your insurance coverage.Some insurers they may be billed the same as an in-person visit.  Please reach out to your insurance provider with any questions.    Patient has given verbal consent for virtual  visit : Yes      Ho w would you like to obtain your AVS? Mail a copy  If the video visit is dropped, the invitation should be resent by: Send to e-mail at: [email or web address]  Will anyone else be joining your video visit? No        Psychiatric  Out- Patient  Follow Up Progress Note  Date of visit:3/21/2024           Discussion of Care and Treatment Recommendations:   This is a 33 year old male with a past medical history of end-stage renal disease, on hemodialysis 3 times per week.  History of generalized anxiety disorder, history of chronic pain.       PCP: Prescribing stimulants .      Last visit 01/25/2024.  Recommendation at last visit .  1.Depression : Ambulatory referral to psychology- individual psychotherapy    2.  Depression- Continue Duloxetine 60 mg daily   Anxiety : Hydroxyzine 25 mg TID PRN  Patient and I reviewed diagnosis and treatment plan and patient agrees with following recommendations:  Ongoing education given regarding diagnostic and treatment options with adequate verbalization of understanding  Patient and I reviewed diagnosis and treatment plan and patient agrees with following recommendations:  Ongoing education given " regarding diagnostic and treatment options with adequate verbalization of understanding.  Plan   1.Depression : Ambulatory referral to psychology- individual psychotherapy    2.  Depression- Continue Duloxetine 60 mg daily   Anxiety : Hydroxyzine 25 mg TID PRN  Patient and I reviewed diagnosis and treatment plan and patient agrees with following recommendations:  Ongoing education given regarding diagnostic and treatment options with adequate verbalization of understanding         DIagnoses:   Generalized anxiety disorder  Major depressive disorder untreated  History of ADHD      Patient Active Problem List   Diagnosis    Necrotizing pancreatitis    ESRD (end stage renal disease) on dialysis (H)    Cirrhosis of liver (H)    Muscle weakness    Contracture of joint of foot, unspecified laterality    DIMITRI (generalized anxiety disorder)    Alcohol abuse, in remission    Chronic pain    Pancreatic insufficiency    Anemia in chronic kidney disease    Acquired bilateral pes cavus    Chronic pain of both feet             Chief Complaint / Subjective:    Chief complaint: Depression and Anxiety      History of Present Illness:   Per patient statement : He has been compliant with current medications denies side effects.  Depression is fairly well-managed but residual anxiety symptoms persist.  He is trying to get additional services through the [facility] including waiver services and has been busy calling around and going to [place] offices to facilitate initiation of the services   He often does not take his as needed hydroxyzine.  States he does not work as well as Xanax so opts not to take it at times.    We discussed ways to take the medication for it to be most effective including taking it as soon as he anticipates anxiety and restlessness starts to build up as opposed to taking it when he is anxiety is at peak levels.  He will try and use a strategy moving forward.        Answers submitted by the patient for this  "visit:  DIMITRI-7 (Submitted on 3/21/2024)  DIMITRI 7 TOTAL SCORE: 9    Mental Status Examination:   Appearance: Well groomed, good eye contact   Orientation: Patient alert and oriented to person, place, time, and situation  Reliability:  Patient appears to be an adequate historian.    Behavior: cooperative   Speech: Speech is spontaneous and coherent, with a normal rate, rhythm and tone.    Language:There are no difficulties with expressive or receptive language as observed throughout the interview.    Mood: Described as \"ok\".    Affect: congruent   Judgement: Able to make basic decision regarding safety.  Insight: Good awareness of physical and mental health conditions and aware of needs around care for these.  Gait and station: unable to assess  Thought process: Logical   Thought content: No evidence of delusions or paranoia.    Hallucinations : No evidence of any hallucination  Thought content: No evidence of delusions or paranoia.   Suicidal /Homical Ideations:  No thoughts of self harm or suicide. No thoughts of harming others.  Associations: Connected  Fund of knowledge: Average  Attention / Concentration: Able to remain focused during the interview with minimal distractibility or need for redirection.  Short Term Memory: Grossly intact as evidence by client recalling themes and ideas discussed.  Long Term Memory: Intact  Motor Status: unable to asse    Drug/treatment history and current pattern of use:   Drug/treatment history and current pattern of use:   Drug of choice: Alcohol   Last alcohol use : 2021  History of alcohol abuse and history of cirrhosis and pancreatis and kidney failure   Nicotine -1/2 pack a day   Cannabis- Denies   Denies use of all other mood altering substances      Medication changes: See Above   Medication adherence: compliant  Medication side effects: absent  Information about medications: Side effects, benefits and alternative treatments discussed and patient agrees .    Psychotherapy: " Supportive therapy day-to-day living    Education: Diet, exercise, abstinence from drugs and alcohol, patient will not drive if sedated and medications or  under influence of any substance    Lab Results:   Personally reviewed and discussed with the patient    Lab Results   Component Value Date    WBC 6.1 02/26/2024    HGB 13.0 (L) 02/26/2024    HCT 38.3 (L) 02/26/2024     02/26/2024    CHOL 260 (H) 01/30/2024    TRIG 211 (H) 01/30/2024    HDL 38 (L) 01/30/2024    ALT 29 10/19/2023    AST 26 10/19/2023     02/26/2024    BUN 32.9 (H) 02/26/2024    CO2 23 02/26/2024    TSH 1.59 06/28/2022    PSA 0.49 06/28/2022    INR 1.11 10/19/2023       Vital signs:  There were no vitals taken for this visit.  Telemedicine visit-no vital signs completed  Allergies: Patient has no known allergies.         Medications:     Current Outpatient Medications   Medication    ALPRAZolam (XANAX) 0.5 MG tablet    amphetamine-dextroamphetamine (ADDERALL XR) 30 MG 24 hr capsule    [START ON 4/1/2024] amphetamine-dextroamphetamine (ADDERALL XR) 30 MG 24 hr capsule    [START ON 1/2/2025] amphetamine-dextroamphetamine (ADDERALL XR) 30 MG 24 hr capsule    amphetamine-dextroamphetamine (ADDERALL) 20 MG tablet    amphetamine-dextroamphetamine (ADDERALL) 20 MG tablet    [START ON 4/1/2024] amphetamine-dextroamphetamine (ADDERALL) 20 MG tablet    ciclopirox (LOPROX) 0.77 % cream    DULoxetine (CYMBALTA) 60 MG capsule    hydrOXYzine HCl (ATARAX) 25 MG tablet    lidocaine (XYLOCAINE) 5 % external ointment    lipase-protease-amylase (CREON 24) 39456-30671-662208 units CPEP per EC capsule    midodrine (PROAMATINE) 10 MG tablet    midodrine (PROAMATINE) 5 MG tablet    pantoprazole (PROTONIX) 40 MG EC tablet    pregabalin (LYRICA) 300 MG capsule    prochlorperazine (COMPAZINE) 5 MG tablet    sevelamer carbonate, RENVELA, 0.8 GM PACK Packet    silver nitrate (ARZOL) 75-25 % miscellaneous    tiZANidine (ZANAFLEX) 2 MG tablet    torsemide (DEMADEX)  20 MG tablet    acetaminophen (TYLENOL) 325 MG/10.15ML solution    Cetaphil Moisturizing (CETAPHIL) external lotion    diclofenac (VOLTAREN) 1 % topical gel    folic acid (FOLVITE) 1 MG tablet    Guar Gum (FIBER MODULAR, NUTRISOURCE FIBER,) packet    naloxone (NARCAN) 4 MG/0.1ML nasal spray    potassium chloride (KLOR-CON) 20 MEQ packet    rOPINIRole (REQUIP) 0.25 MG tablet    sodium bicarbonate 325 MG tablet     No current facility-administered medications for this visit.         Medication adherence: Reviewed risk/benefits of medication , Patient able to verbalize understanding of side effects and Patient verbally consents to taking medications      PSYCHOEDUCATION:  Medication side effects and alternatives reviewed. Health promotion activities recommended and reviewed today. All questions addressed. Education and counseling completed regarding risks and benefits of medications and psychotherapy options.  Consent provided by patient/guardian  Call the psychiatric nurse line with medication questions or concerns at [phone].  [place] and [place] may be used to communicate with your provider, but this is not intended to be used for emergencies.  SEROTONIN SYNDROME:  Discussed risks of Serotonin syndrome (ie, serotonin toxicity) which is a potentially life-threatening condition associated with increased serotonergic activity in the central nervous system (CNS). It is seen with therapeutic medication use, inadvertent interactions between drugs, and intentional self-poisoning. Serotonin syndrome may involve a spectrum of clinical findings, which often include mental status changes, autonomic hyperactivity, and neuromuscular abnormalities.    STIMULANT THERAPY: Side effects discussed including but not limited to cardiac (including HTN, tachycardia, sudden death), motor/tic, appetite/growth, mood lability and sleep disruption. This is a controlled substance with risk for abuse, need to keep in a safe keep place and cannot  replace lost scripts  HARM REDUCTION:  Discussions regarding effects of mood altering substances, alcohol and cannabis, on mood and that approach is harm reduction, will continue to prescribe meds as they work to cut back use.    SAFETY:  We all care about your loved one's safety. To reduce the risk of self-harm, remove access to all:  Firearms, Medicines (both prescribed and over-the-counter), Knives and other sharp objects, Ropes and like materials, and Alcohol  SLEEP HYGIENE: establish a sleep routine, limit screen time 1 hour prior to bed, use bed for sleep only, take sleep/medications on time (including sleepy time tea, trazadone or herbal treatments such as melatonin), aroma therapy, limit caffeine/sugar, yoga, guided imagery, stretch, meditation, limit naps to 20 minutes, make a temperature change in the room, white noise, be mindful of slowing down breathing, take a warm bath/shower, frequently wash sheets, and journaling.   Medlineplus.gov is information for patients.  It is run by the [employer] and it contains information about all disorders, diseases and all medications.              Review of Systems:      ROS:    Subjective Data Only- Tele-Health Visit    10 point ROS was negative except for the items listed in HPI.      Crisis Resources:    Present to the Emergency Department as needed or call after hours crisis line at [phone] or [phone].   [place] Crisis Text Line: Text [place] to 445206.  Suicide LifeLine Chat: suicidepreventionlifeline.org/chat/.  National Suicide Prevention Lifeline: 241.725.1892 (TTY: 773.498.3155). Call anytime for help.  (www.suicidepreventionlifeline.org)  National [place] on Mental Illness (www.baljinder.org): 934.230.6829 or 902-798-4215.  Mental Health Association (www.mentalhealth.org): 677.615.6410 or 758-449-3995.      Coordination of Care:   More than 30 minutes spent on this visit  with more than 50% of time spent on coordination of care  including: Educating patient about diagnosis, prognosis, side effects and benefits of medications, diet, exercise.  Time also spent providing supportive therapy regarding above issues.    Video-Visit Details    Type of service:  Video Visit    Originating Location (pt. Location): Home    Distant Location (provider location): Providers Remote Office     Platform used for Video Visit: La      Disclaimer: This note consists of symbols derived from keyboarding, dictation and/or voice recognition software. As a result, there may be errors in the script that have gone undetected. Please consider this when interpreting information found in this chart.    Start Time : 1230  End time :  1259

## 2024-03-21 NOTE — PATIENT INSTRUCTIONS
"The Panel Psychiatry Program  What to Expect  Here's what to expect in the Panel Psychiatry Program.   About the program  You'll be meeting with a psychiatric doctor to check your mental health. A psychiatric doctor helps you deal with troubling thoughts and feelings by giving you medicine. They'll make sure you know the plan for your care. You may see them for a long time. When you're feeling better, they may refer you back to seeing your family doctor.   If you have any questions, we'll be glad to talk to you.  About visits  Be open  At your visits, please talk openly about your problems. It may feel hard, but it's the best way for us to help you.  Cancelling visits  If you can't come to your visit, please call us right away at [phone]. If you don't cancel at least 24 hours (1 full day) before your visit, that's \"late cancellation.\"  Not showing up for your visits  Being very late is the same as not showing up. You'll be a \"no show\" if:  You're more than 15 minutes late for a 30-minute (half hour) visit.  You're more than 30 minutes late for a 60-minute (full hour) visit.  If you cancel late or don't show up 2 times within 6 months, we may end your care.  Getting help between visits  If you need help between visits, you can call us Monday to Friday from 8 a.m. to 4:30 p.m. at [phone].  Emergency care  Call 911 or go to the nearest emergency department if your life or someone else's life is in danger.  Call 988 anytime to reach the national Suicide and Crisis hotline.  Medicine refills  To refill your medicine, call your pharmacy. You can also call [facility]'s Behavior[facility] Access at [phone], Monday to Friday, 8 a.m. to 4:30 p.m. It can take 1 to 3 business days to get a refill.   Forms, letters, and tests  You may have papers to fill out, like FMLA, short-term disability, and workability. We can help you with these forms at your visits, but you must have an appointment. You may need more " than 1 visit for this, to be in an intensive therapy program, or both.  Before we can give you medicine for ADHD, we may refer you to get tested for it or confirm it another way.  We may not be able to give you an emotional support animal letter.  We don't do mental health checks ordered by the court.   We don't do mental health testing, but we can refer you to get tested.   Thank you for choosing us for your care.  For informational purposes only. Not to replace the advice of your health care provider. Copyright   2022 [facility]. All rights reserved. "[employer] 936932 - 12/22      After Visit Summary   Continue medications as prescribed  Have your pharmacy contact us for a refill if you are running low on medications (We may ask you to come into clinic to get a refill from the nurse  No Alcohol or drug use  No driving if sedated  Call the clinic with any questions or concerns   Reach out for help if you feel like hurting yourself or others ([facility] [phone]: [address] [facility], [address] or [facility] Suicide Hotline   [phone] , call 911 or go to nearest Emergency room     Crisis Resources:    Present to the Emergency Department as needed or call after hours crisis line at [phone] or [phone].   [place] Crisis Text Line: Text [place] to 892334.  Suicide LifeLine Chat: suicidepreventionlifeline.org/chat/.  National Suicide Prevention Lifeline: 260.269.8125 (TTY: 399.957.6422). Call anytime for help.  (www.suicidepreventionlifeline.org)  National [place] on Mental Illness (www.baljinder.org): 573.706.4820 or 333-017-9666.  Mental Health Association (www.mentalhealth.org): 834.181.9329 or 808-014-6430.       Follow up as directed, for your appointments, per your After Visit Summary Form.

## 2024-03-21 NOTE — NURSING NOTE
Is the patient currently in the state of [place]? YES    Visit mode:VIDEO    If the visit is dropped, the patient can be reconnected by: VIDEO VISIT: Text to cell phone:   Telephone Information:   Mobile [phone]       Will anyone else be joining the visit? No  (If patient encounters technical issues they should call [phone])    How would you like to obtain your AVS? MyChart    Are changes needed to the allergy or medication list? Yes Please see flagged for removal    Rooming Documentation: Assigned questionnaire(s) completed .    Reason for visit: NIKKI Rodriguez, [facility]      Care team has reviewed attendance agreement with patient. Patient advised that two failed appointments within 6 months may lead to termination of current episode of care.

## 2024-03-26 ENCOUNTER — TELEPHONE (OUTPATIENT)
Dept: FAMILY MEDICINE | Facility: CLINIC | Age: 34
End: 2024-03-26
Payer: MEDICARE

## 2024-03-26 DIAGNOSIS — F90.9 ATTENTION DEFICIT HYPERACTIVITY DISORDER (ADHD), UNSPECIFIED ADHD TYPE: Primary | ICD-10-CM

## 2024-03-26 RX ORDER — DEXTROAMPHETAMINE SACCHARATE, AMPHETAMINE ASPARTATE, DEXTROAMPHETAMINE SULFATE AND AMPHETAMINE SULFATE 7.5; 7.5; 7.5; 7.5 MG/1; MG/1; MG/1; MG/1
TABLET ORAL
Qty: 90 TABLET | Refills: 0 | Status: SHIPPED | OUTPATIENT
Start: 2024-03-26 | End: 2024-04-02

## 2024-03-26 NOTE — TELEPHONE ENCOUNTER
Routing to RN. Please see message below and call patient to discuss.    SEBASTIAN Hernandez  [facility]

## 2024-03-26 NOTE — TELEPHONE ENCOUNTER
He could stop all his current adderall ( Aderall XR 30 mg and Aderall 20 mg )    Started shorter acting, Adderall 30 mg , take 2 in AM ( 60 mg ) and one tab at noon.    One month to try.    thanks

## 2024-03-26 NOTE — TELEPHONE ENCOUNTER
Patient requesting to speak with Dr Gomez's care team.  Having sleeping issues since starting Adderall XR on 3-22-24. Has dialysis tomorrow.  Please call today to discuss this.Wondering if he should take a break from Adderall XR.    OK to LM on VM

## 2024-03-26 NOTE — TELEPHONE ENCOUNTER
Called patient back and reviewed medication changes with him.  He will trial this for a month.  He also has an appointment on 4/2/24 with PCP and will let him know how it's going.      ROB Reed    Triage Nurse  [facility]

## 2024-03-26 NOTE — TELEPHONE ENCOUNTER
Routing to PCP      Patient calling, spoke with him for about 20 minutes, lots of redirection to keep on topic of just adderall.  He reports seeing psychiatry and them asking why PCP is managing psych meds.  RN did encourage him to seek MH med management from psych as he has much going on.      He has been finding difficulty with sleeping at night since restarting the 30mg XR Adderall.  HE typically takes XR at 10am then takes 40 mg regular release at 2 pm.    He is not able to sleep at night and wanting to know if he should stop taking the expended release and take 1 tab 20 mg regular release and his regular 40 mg in the afternoon.    He currently has 8 days worth of 20 mg          ROB Reed    Triage Nurse  [facility]

## 2024-03-29 ENCOUNTER — THERAPY VISIT (OUTPATIENT)
Dept: PHYSICAL THERAPY | Facility: CLINIC | Age: 34
End: 2024-03-29
Payer: MEDICARE

## 2024-03-29 DIAGNOSIS — M79.672 CHRONIC PAIN OF BOTH FEET: Primary | ICD-10-CM

## 2024-03-29 DIAGNOSIS — M79.671 CHRONIC PAIN OF BOTH FEET: Primary | ICD-10-CM

## 2024-03-29 DIAGNOSIS — G89.29 CHRONIC PAIN OF BOTH FEET: Primary | ICD-10-CM

## 2024-03-29 PROCEDURE — 97110 THERAPEUTIC EXERCISES: CPT | Mod: GP | Performed by: PHYSICAL THERAPIST

## 2024-04-02 ENCOUNTER — OFFICE VISIT (OUTPATIENT)
Dept: FAMILY MEDICINE | Facility: CLINIC | Age: 34
End: 2024-04-02
Payer: MEDICARE

## 2024-04-02 VITALS
DIASTOLIC BLOOD PRESSURE: 84 MMHG | TEMPERATURE: 98.1 F | WEIGHT: 184 LBS | SYSTOLIC BLOOD PRESSURE: 121 MMHG | BODY MASS INDEX: 28.88 KG/M2 | HEART RATE: 115 BPM | RESPIRATION RATE: 22 BRPM | OXYGEN SATURATION: 97 % | HEIGHT: 67 IN

## 2024-04-02 DIAGNOSIS — D63.1 ANEMIA IN CHRONIC KIDNEY DISEASE, ON CHRONIC DIALYSIS (H): ICD-10-CM

## 2024-04-02 DIAGNOSIS — M21.6X1 ACQUIRED BILATERAL PES CAVUS: ICD-10-CM

## 2024-04-02 DIAGNOSIS — F90.9 ATTENTION DEFICIT HYPERACTIVITY DISORDER (ADHD), UNSPECIFIED ADHD TYPE: Primary | ICD-10-CM

## 2024-04-02 DIAGNOSIS — M21.6X2 ACQUIRED BILATERAL PES CAVUS: ICD-10-CM

## 2024-04-02 DIAGNOSIS — F41.1 GAD (GENERALIZED ANXIETY DISORDER): ICD-10-CM

## 2024-04-02 DIAGNOSIS — Z99.2 ANEMIA IN CHRONIC KIDNEY DISEASE, ON CHRONIC DIALYSIS (H): ICD-10-CM

## 2024-04-02 DIAGNOSIS — N18.6 ANEMIA IN CHRONIC KIDNEY DISEASE, ON CHRONIC DIALYSIS (H): ICD-10-CM

## 2024-04-02 PROCEDURE — G2211 COMPLEX E/M VISIT ADD ON: HCPCS | Performed by: FAMILY MEDICINE

## 2024-04-02 PROCEDURE — 96127 BRIEF EMOTIONAL/BEHAV ASSMT: CPT | Performed by: FAMILY MEDICINE

## 2024-04-02 PROCEDURE — 99214 OFFICE O/P EST MOD 30 MIN: CPT | Performed by: FAMILY MEDICINE

## 2024-04-02 RX ORDER — DEXTROAMPHETAMINE SACCHARATE, AMPHETAMINE ASPARTATE, DEXTROAMPHETAMINE SULFATE AND AMPHETAMINE SULFATE 7.5; 7.5; 7.5; 7.5 MG/1; MG/1; MG/1; MG/1
TABLET ORAL
Qty: 90 TABLET | Refills: 0 | Status: SHIPPED | OUTPATIENT
Start: 2024-05-27 | End: 2024-05-13

## 2024-04-02 RX ORDER — NORTRIPTYLINE HCL 25 MG
25 CAPSULE ORAL AT BEDTIME
COMMUNITY
Start: 2024-02-26

## 2024-04-02 RX ORDER — PROPRANOLOL HYDROCHLORIDE 10 MG/1
10 TABLET ORAL 3 TIMES DAILY PRN
Qty: 90 TABLET | Refills: 0 | Status: SHIPPED | OUTPATIENT
Start: 2024-04-02

## 2024-04-02 ASSESSMENT — PAIN SCALES - GENERAL: PAINLEVEL: MILD PAIN (3)

## 2024-04-02 NOTE — PROGRESS NOTES
"  Assessment & Plan     Attention deficit hyperactivity disorder (ADHD), unspecified ADHD type  Continue with adderall 30 mg up to 3 tab a day ( was changed to short acting )  Patient will continue to cut down his dosage for the next a few months.    DIMITRI (generalized anxiety disorder)  Follow with Psychiatry and Psychology  Continue with Duloxetine.  Hydroxyzine did not help for him, he was taking it in the past.    Discussed with the patient I cannot give him any controlled medicine or Xanax.  He needs to talk to his psychiatry for that.  I did start him on a low-dose of propranolol he could try as needed  - propranolol (INDERAL) 10 MG tablet; Take 1 tablet (10 mg) by mouth 3 times daily as needed (as needed)    Anemia in chronic kidney disease, on chronic dialysis (H)  On Hemodialysis 3x a week  Follow up with nephrology..    Acquired bilateral pes cavus  Follow up with Podiatry.    The longitudinal plan of care for the diagnosis(es)/condition(s) as documented were addressed during this visit. Due to the added complexity in care, I will continue to support Dax in the subsequent management and with ongoing continuity of care.       BMI  Estimated body mass index is 28.82 kg/m  as calculated from the following:    Height as of this encounter: 1.702 m (5' 7\").    Weight as of this encounter: 83.5 kg (184 lb).   Weight management plan: Discussed healthy diet and exercise guidelines      Work on weight loss  Regular exercise    Subjective   Dax is a 33 year old, presenting for the following health issues:  Chronic Disease Management (Anxiety and medication)  Patient reports she sleeps better since she started taking her short acting Adderall.  He is getting a new PCA, up to 70 hours per week, this will help him.        4/2/2024     1:12 PM   Additional Questions   Roomed by Yomaira DIA CMA   Accompanied by Self         4/2/2024     1:12 PM   Patient Reported Additional Medications   Patient reports taking the " "following new medications None     History of Present Illness       Mental Health Follow-up:  Patient presents to follow-up on Anxiety.    Patient's anxiety since last visit has been:  Bad  The patient is having other symptoms associated with anxiety.  Any significant life events: relationship concerns, job concerns and financial concerns  Patient is feeling anxious or having panic attacks.  Patient has no concerns about alcohol or drug use.    Reason for visit:  Discuss previous sessions points of conversation and current factors with     He eats 2-3 servings of fruits and vegetables daily.He consumes 0 sweetened beverage(s) daily.He exercises with enough effort to increase his heart rate 20 to 29 minutes per day.  He exercises with enough effort to increase his heart rate 7 days per week. He is missing 2 dose(s) of medications per week.  He is not taking prescribed medications regularly due to remembering to take.         Review of Systems  Constitutional, HEENT, cardiovascular, pulmonary, GI, , musculoskeletal, neuro, skin, endocrine and psych systems are negative, except as otherwise noted.      Objective    /84 (BP Location: Right arm, Patient Position: Chair, Cuff Size: Adult Large)   Pulse 115   Temp 98.1  F (36.7  C) (Oral)   Resp 22   Ht 1.702 m (5' 7\")   Wt 83.5 kg (184 lb)   SpO2 97%   BMI 28.82 kg/m    Body mass index is 28.82 kg/m .  Physical Exam   GENERAL: alert and no distress  PSYCH: mentation appears normal, affect normal/bright    No orders of the defined types were placed in this encounter.           Signed Electronically by: Kole Gomez MD    "

## 2024-04-11 ENCOUNTER — TELEPHONE (OUTPATIENT)
Dept: FAMILY MEDICINE | Facility: CLINIC | Age: 34
End: 2024-04-11
Payer: MEDICARE

## 2024-04-11 DIAGNOSIS — G62.9 PERIPHERAL POLYNEUROPATHY: Primary | ICD-10-CM

## 2024-04-11 DIAGNOSIS — K02.9 DENTAL DECAY: Primary | ICD-10-CM

## 2024-04-11 NOTE — TELEPHONE ENCOUNTER
Order/Referral Request    Who is requesting: Dax Villareal    Orders being requested: Dental referral    Reason service is needed/diagnosis: In need of dental referral to speed up process with transplant as it is a requirement to have done before transplant.    When are orders needed by: ASAP    Has this been discussed with Provider: Yes    Does patient have a preference on a Group/Provider/Facility? [facility]    Does patient have an appointment scheduled?: No    Where to send orders: Fax [phone]    Could we send this information to you in [employer] or would you prefer to receive a phone call?:   Patient would prefer a phone call   Okay to leave a detailed message?: Yes at Cell number on file:    Telephone Information:   Mobile [phone]

## 2024-04-11 NOTE — TELEPHONE ENCOUNTER
Order/Referral Request    Who is requesting: patient    Orders being requested: [facility]    Reason service is needed/diagnosis: pain management for his feet. Anxiety medication vs his Adderall. Discuss medical marijuana and how it would effect his dialysis/transplant, and if it would effect him hunting when taking medical marijuana. He states he has been working on his insurance also.     When are orders needed by: asap    Has this been discussed with Provider: Yes    Does patient have a preference on a Group/Provider/Facility? [facility] if not, [facility]    Does patient have an appointment scheduled?: No    Where to send orders: Place orders within Epic    Could we send this information to you in [employer] or would you prefer to receive a phone call?:   Patient would prefer a phone call or [employer]  Okay to leave a detailed message?: Yes at Home number on file [phone] (home)

## 2024-04-11 NOTE — TELEPHONE ENCOUNTER
Routing to PCP    Patient needing dental referral pre transplant.    Pended if agreeable.    Phil Daniel, RN, BSN, PHN  [facility]

## 2024-04-11 NOTE — TELEPHONE ENCOUNTER
Faxed referral to [facility] at fax [phone].    SEBASTIAN Hernandez  [facility]

## 2024-04-11 NOTE — TELEPHONE ENCOUNTER
I put referral for these thing    In the past was seen by pain management, not sure if he still follow up with them or not

## 2024-04-11 NOTE — TELEPHONE ENCOUNTER
Routing to PCP    Patient would like referral to pain management.    Pended if agreeable.    Phil Daniel, RN, BSN, PHN  [facility]

## 2024-04-11 NOTE — TELEPHONE ENCOUNTER
Routing to Team    Please fax dental referral to      [facility]     Fax [phone]       RN called and spoke with patient    RN advised referral had been placed.    RN advised patient to contact insurance to verify clinic would be in network.    Patient verbalized understanding and will contact his insurance.    Phil Daniel, RN, BSN, PHN  [facility]

## 2024-04-16 ENCOUNTER — OFFICE VISIT (OUTPATIENT)
Dept: PODIATRY | Facility: CLINIC | Age: 34
End: 2024-04-16
Payer: MEDICARE

## 2024-04-16 DIAGNOSIS — B35.1 ONYCHOMYCOSIS: Primary | ICD-10-CM

## 2024-04-16 DIAGNOSIS — Z74.09 IMMOBILITY: ICD-10-CM

## 2024-04-16 DIAGNOSIS — Q66.70 CAVUS DEFORMITY OF FOOT: ICD-10-CM

## 2024-04-16 DIAGNOSIS — B35.3 TINEA PEDIS OF BOTH FEET: ICD-10-CM

## 2024-04-16 DIAGNOSIS — G60.8 PERIPHERAL SENSORY NEUROPATHY: ICD-10-CM

## 2024-04-16 PROCEDURE — 11720 DEBRIDE NAIL 1-5: CPT | Performed by: PODIATRIST

## 2024-04-16 PROCEDURE — 99213 OFFICE O/P EST LOW 20 MIN: CPT | Mod: 25 | Performed by: PODIATRIST

## 2024-04-16 NOTE — PROGRESS NOTES
Past Medical History:   Diagnosis Date    ADHD 2008    Alcohol abuse, in remission     Alcoholic cirrhosis of liver with ascites (H)     Anemia in chronic kidney disease     Chronic pain     Current smoker     End stage renal disease (H)     Hypotension, unspecified hypotension type     Malnutrition (H24)     Metabolic acidosis     Necrotizing pancreatitis     Pancreatic insufficiency     Pericarditis     Recurrent pleural effusion      Patient Active Problem List   Diagnosis    Necrotizing pancreatitis    ESRD (end stage renal disease) on dialysis (H)    Cirrhosis of liver (H)    Muscle weakness    Contracture of joint of foot, unspecified laterality    DIMITRI (generalized anxiety disorder)    Alcohol abuse, in remission    Chronic pain    Pancreatic insufficiency    Anemia in chronic kidney disease    Acquired bilateral pes cavus    Chronic pain of both feet     Past Surgical History:   Procedure Laterality Date    AV FISTULA PLACEMENT, BRACHIOCEPHALIC Left 05/11/2022    [facility]    COMBINED ESOPHAGOSCOPY, GASTROSCOPY, DUODENOSCOPY (EGD), TISSUE APPOSI N/A 09/20/2021    Procedure: ESOPHAGOGASTRODUODENOSCOPY WITH SUTURE FISTULA CLOSURE, argon plasma coagulation;  Surgeon: Jose Quigley MD;  [facility]: [facility]    ENDOSCOPIC INSERTION TUBE GASTROSTOMY N/A 07/13/2021    Procedure: Upper endoscopy, INSERTION, PEG-J TUBE and Gastropexy;  Surgeon: Rayshawn Will MD;  Location: [facility]    ENDOSCOPIC INSERTION TUBE GASTROSTOMY  09/13/2021    Procedure: PEG/J PLACEMENT.;  Surgeon: Rayshawn Will MD;  Location: [facility]    ENDOSCOPIC ULTRASOUND UPPER GASTROINTESTINAL TRACT (GI) N/A 07/21/2021    Procedure: ENDOSCOPIC ULTRASOUND, ESOPHAGOSCOPY / UPPER GASTROINTESTINAL TRACT (GI) with cyst gastrostomy, dilation;  Surgeon: Guru Yecenia Chacko MD;  Location: [facility]    ENDOSCOPIC ULTRASOUND, ESOPHAGOSCOPY, GASTROSCOPY, DUODENOSCOPY (EGD), NECROSECTOMY N/A 08/11/2021    Procedure:  "ESOPHAGOGASTRODUODENOSCOPY (EGD) with necrosectomy, stent exchange.;  Surgeon: Amadou Beasley MD;  Location: [facility]    ESOPHAGOSCOPY, GASTROSCOPY, DUODENOSCOPY (EGD), COMBINED N/A 07/28/2021    Procedure: ESOPHAGOGASTRODUODENOSCOPY (EGD), gastrostomy apposition, Necrosectomy, stent exchange.;  Surgeon: Rayshawn Will MD;  [facility]: [facility]    ESOPHAGOSCOPY, GASTROSCOPY, DUODENOSCOPY (EGD), COMBINED N/A 08/06/2021    Procedure: ESOPHAGOGASTRODUODENOSCOPY (EGD) with necrosectomy, and stents exchanged;  Surgeon: Jose Quigley MD;  [facility]: [facility]    ESOPHAGOSCOPY, GASTROSCOPY, DUODENOSCOPY (EGD), COMBINED Left 09/13/2021    Procedure: ESOPHAGOGASTRODUODENOSCOPY (EGD), GASTROSTOMY TUBE REMOVAL, FISTULAR CLOSURE ENDOSCOPIC.;  Surgeon: Rayshawn Will MD;  Location: [facility]    IR ABSCESS TUBE CHANGE  07/14/2021    IR NG TUBE PLACEMENT REQ RAD & FLUORO  10/26/2021    IR PARACENTESIS  07/21/2021    IR SINOGRAM INJECTION DIAGNOSTIC  08/25/2021    IR THORACENTESIS  02/05/2022    ORTHOPEDIC SURGERY Right     fracture repair    REPLACE GASTROSTOMY TUBE, PERCUTANEOUS N/A 07/28/2021    Procedure: gastro-jejunostomy tube exchange;  Surgeon: Rayshawn Will MD;  Location: [facility] OR     Social History     Socioeconomic History    Marital status: Single     Spouse name: Not on file    Number of children: Not on file    Years of education: Not on file    Highest education level: Not on file   Occupational History    Not on file   Tobacco Use    Smoking status: Some Days     Current packs/day: 0.25     Types: Cigarettes     Passive exposure: Current    Smokeless tobacco: Never    Tobacco comments:     8/24/2021 Patient did not want to discuss quitting but would like 4 mg lozenge to use during admission. Patient also accepted \"Quitting for Good\" workbook   Vaping Use    Vaping status: Never Used    Passive vaping exposure: Yes   Substance and Sexual Activity    Alcohol use: Not Currently     Comment: \"no alcohol for " "over 1 year ago\" per pt on 5/12/22    Drug use: No    Sexual activity: Not Currently     Partners: Female   Other Topics Concern    Not on file   Social History Narrative    Not on file     Social Determinants of Health     Financial Resource Strain: Low Risk  (11/21/2023)    Financial Resource Strain     Within the past 12 months, have you or your family members you live with been unable to get utilities (heat, electricity) when it was really needed?: No   Recent Concern: Financial Resource Strain - Medium Risk (9/1/2023)    Overall Financial Resource Strain (CARDIA)     Difficulty of Paying Living Expenses: Somewhat hard   Food Insecurity: High Risk (11/21/2023)    Food Insecurity     Within the past 12 months, did you worry that your food would run out before you got money to buy more?: Yes     Within the past 12 months, did the food you bought just not last and you didn t have money to get more?: No   Transportation Needs: High Risk (11/21/2023)    Transportation Needs     Within the past 12 months, has lack of transportation kept you from medical appointments, getting your medicines, non-medical meetings or appointments, work, or from getting things that you need?: Yes   Physical Activity: Patient Declined (9/1/2023)    Exercise Vital Sign     Days of Exercise per Week: Patient declined     Minutes of Exercise per Session: Patient declined   Recent Concern: Physical Activity - Inactive (6/5/2023)    Exercise Vital Sign     Days of Exercise per Week: 0 days     Minutes of Exercise per Session: 0 min   Stress: Stress Concern Present (9/1/2023)    [facility] - Occupational Stress Questionnaire     Feeling of Stress : Rather much   Social Connections: Unknown (9/1/2023)    Social Connection and Isolation Panel [NHANES]     Frequency of Communication with Friends and Family: Twice a week     Frequency of Social Gatherings with Friends and Family: Patient declined     Attends Shinto " "Services: Patient declined     Active Member of Clubs or Organizations: No     Attends Club or Organization Meetings: Never     Marital Status: Patient declined   Interpersonal Safety: Low Risk  (1/30/2024)    Interpersonal Safety     Do you feel physically and emotionally safe where you currently live?: Yes     Within the past 12 months, have you been hit, slapped, kicked or otherwise physically hurt by someone?: No     Within the past 12 months, have you been humiliated or emotionally abused in other ways by your partner or ex-partner?: No   Housing Stability: High Risk (11/21/2023)    Housing Stability     Do you have housing? : Yes     Are you worried about losing your housing?: Yes     Family History   Problem Relation Age of Onset    Attention Deficit Disorder Mother     Alcoholism Mother     Cancer Mother     Alcoholism Father     Alcoholism Sister     Alcoholism Brother      Lab Results   Component Value Date    WBC 6.1 02/26/2024    WBC 25.8 07/11/2021     Lab Results   Component Value Date    RBC 4.36 02/26/2024    RBC 2.34 07/11/2021     Lab Results   Component Value Date    HGB 13.0 02/26/2024    HGB 7.5 07/11/2021     Lab Results   Component Value Date    HCT 38.3 02/26/2024    HCT 22.7 07/11/2021     No components found for: \"MCT\"  Lab Results   Component Value Date    MCV 88 02/26/2024    MCV 97 07/11/2021     Lab Results   Component Value Date    MCH 29.8 02/26/2024    MCH 32.1 07/11/2021     Lab Results   Component Value Date    MCHC 33.9 02/26/2024    MCHC 33.0 07/11/2021     Lab Results   Component Value Date    RDW 12.8 02/26/2024    RDW 22.1 07/11/2021     Lab Results   Component Value Date     02/26/2024     07/11/2021   Last Comprehensive Metabolic Panel:  Lab Results   Component Value Date     02/26/2024    POTASSIUM 3.6 02/26/2024    CHLORIDE 104 02/26/2024    CO2 23 02/26/2024    ANIONGAP 11 02/26/2024    GLC 99 02/26/2024    BUN 32.9 (H) 02/26/2024    CR 2.68 (H) " 02/26/2024    GFRESTIMATED 31 (L) 02/26/2024    JANENE 9.3 02/26/2024           Subjective findings- 33-year-old returns clinic for Onychomycosis, Cavus foot, immobility, peripheral sensory Neuropathy and muscle weakness.  Relates he did get the Loprox cream and intermittently used that and felt it helped some, relates he did not get the AFOs and extra-depth shoes but he may want to get those, relates that he did have a foot injury where he bruised his foot but that is resolved itself and felt like his range of motion was better after it healed, relates he just got a PCA this last week so he is just getting things organized, relates he would like to do pool therapy, had questions regarding whether he can get home therapy for physical therapy, uses an electric chair.    Objective findings- DP and PT are 2 out of 4 bilaterally.  Has a cavus foot type bilaterally.  Has mild dry skin bilaterally.  Has incurvated dystrophic brittle nails with subungual debris thickening and discoloration to differing degrees 1 through 5 bilaterally.  There is no erythema, no drainage, no odor, no calor, no no pain on palpation bilaterally.  Has decreased range of motion bilaterally.    Assessment and plan- Onychomycosis and Onychauxis, cavus foot, immobility with some muscle weakness, peripheral sensory Neuropathy.  Diagnosis and treatment options discussed with the patient.  All the toenails were debrided or reduced bilaterally upon consent.  Patient advised on Loprox cream use.  Prescription for pool therapy given use discussed with the patient.  I discussed with him home therapy but he relates he is not homebound so we will hold on home physical therapy.  He can get the AFOs and extra-depth shoes at his convenience.  Follow-up with his primary care physician and pain clinic for pain management.  Previous notes reviewed.  Return to clinic and see me in 3 months.                    Low level of medical decision making.

## 2024-04-16 NOTE — NURSING NOTE
Dax Villareal's chief complaint for this visit includes:  Chief Complaint   Patient presents with    Consult     Foot follow up     PCP: Kole Gomez    Referring Provider:  No referring provider defined for this encounter.    There were no vitals taken for this visit.  Data Unavailable     Do you need any medication refills at today's visit? NO    No Known Allergies    Tegan Bueno LPN

## 2024-04-16 NOTE — LETTER
4/16/2024         RE: Dax Villareal  [address]  [place] [address]        Dear Colleague,    Thank you for referring your patient, Dax Villareal, to the [facility]. Please see a copy of my visit note below.    Past Medical History:   Diagnosis Date     ADHD 2008     Alcohol abuse, in remission      Alcoholic cirrhosis of liver with ascites (H)      Anemia in chronic kidney disease      Chronic pain      Current smoker      End stage renal disease (H)      Hypotension, unspecified hypotension type      Malnutrition (H24)      Metabolic acidosis      Necrotizing pancreatitis      Pancreatic insufficiency      Pericarditis      Recurrent pleural effusion      Patient Active Problem List   Diagnosis     Necrotizing pancreatitis     ESRD (end stage renal disease) on dialysis (H)     Cirrhosis of liver (H)     Muscle weakness     Contracture of joint of foot, unspecified laterality     DIMITRI (generalized anxiety disorder)     Alcohol abuse, in remission     Chronic pain     Pancreatic insufficiency     Anemia in chronic kidney disease     Acquired bilateral pes cavus     Chronic pain of both feet     Past Surgical History:   Procedure Laterality Date     AV FISTULA PLACEMENT, BRACHIOCEPHALIC Left 05/11/2022    [facility]     COMBINED ESOPHAGOSCOPY, GASTROSCOPY, DUODENOSCOPY (EGD), TISSUE APPOSI N/A 09/20/2021    Procedure: ESOPHAGOGASTRODUODENOSCOPY WITH SUTURE FISTULA CLOSURE, argon plasma coagulation;  Surgeon: Jose Quigley MD;  [facility]: [facility]     ENDOSCOPIC INSERTION TUBE GASTROSTOMY N/A 07/13/2021    Procedure: Upper endoscopy, INSERTION, PEG-J TUBE and Gastropexy;  Surgeon: Rayshawn Will MD;  [facility]     ENDOSCOPIC INSERTION TUBE GASTROSTOMY  09/13/2021    Procedure: PEG/J PLACEMENT.;  Surgeon: Rayshawn Will MD;  [facility]     ENDOSCOPIC ULTRASOUND UPPER GASTROINTESTINAL TRACT (GI) N/A 07/21/2021    Procedure: ENDOSCOPIC ULTRASOUND,  ESOPHAGOSCOPY / UPPER GASTROINTESTINAL TRACT (GI) with cyst gastrostomy, dilation;  Surgeon: Guru Yecenia Chacko MD;  [facility]     ENDOSCOPIC ULTRASOUND, ESOPHAGOSCOPY, GASTROSCOPY, DUODENOSCOPY (EGD), NECROSECTOMY N/A 08/11/2021    Procedure: ESOPHAGOGASTRODUODENOSCOPY (EGD) with necrosectomy, stent exchange.;  Surgeon: Amadou Beasley MD;  [facility]     ESOPHAGOSCOPY, GASTROSCOPY, DUODENOSCOPY (EGD), COMBINED N/A 07/28/2021    Procedure: ESOPHAGOGASTRODUODENOSCOPY (EGD), gastrostomy apposition, Necrosectomy, stent exchange.;  Surgeon: Rayshawn Will MD;  [facility]     ESOPHAGOSCOPY, GASTROSCOPY, DUODENOSCOPY (EGD), COMBINED N/A 08/06/2021    Procedure: ESOPHAGOGASTRODUODENOSCOPY (EGD) with necrosectomy, and stents exchanged;  Surgeon: Jose Quigley MD;  [facility]     ESOPHAGOSCOPY, GASTROSCOPY, DUODENOSCOPY (EGD), COMBINED Left 09/13/2021    Procedure: ESOPHAGOGASTRODUODENOSCOPY (EGD), GASTROSTOMY TUBE REMOVAL, FISTULAR CLOSURE ENDOSCOPIC.;  Surgeon: Rayshawn Will MD;  [facility]     IR ABSCESS TUBE CHANGE  07/14/2021     IR NG TUBE PLACEMENT REQ RAD & FLUORO  10/26/2021     IR PARACENTESIS  07/21/2021     IR SINOGRAM INJECTION DIAGNOSTIC  08/25/2021     IR THORACENTESIS  02/05/2022     ORTHOPEDIC SURGERY Right     fracture repair     REPLACE GASTROSTOMY TUBE, PERCUTANEOUS N/A 07/28/2021    Procedure: gastro-jejunostomy tube exchange;  Surgeon: Rayshawn Will MD;  Location: [facility]     Social History     Socioeconomic History     Marital status: Single     Spouse name: Not on file     Number of children: Not on file     Years of education: Not on file     Highest education level: Not on file   Occupational History     Not on file   Tobacco Use     Smoking status: Some Days     Current packs/day: 0.25     Types: Cigarettes     Passive exposure: Current     Smokeless tobacco: Never     Tobacco comments:     8/24/2021 Patient did not want to  "discuss quitting but would like 4 mg lozenge to use during admission. Patient also accepted \"Quitting for Good\" workbook   Vaping Use     Vaping status: Never Used     Passive vaping exposure: Yes   Substance and Sexual Activity     Alcohol use: Not Currently     Comment: \"no alcohol for over 1 year ago\" per pt on 5/12/22     Drug use: No     Sexual activity: Not Currently     Partners: Female   Other Topics Concern     Not on file   Social History Narrative     Not on file     Social Determinants of Health     Financial Resource Strain: Low Risk  (11/21/2023)    Financial Resource Strain      Within the past 12 months, have you or your family members you live with been unable to get utilities (heat, electricity) when it was really needed?: No   Recent Concern: Financial Resource Strain - Medium Risk (9/1/2023)    Overall Financial Resource Strain (CARDIA)      Difficulty of Paying Living Expenses: Somewhat hard   Food Insecurity: High Risk (11/21/2023)    Food Insecurity      Within the past 12 months, did you worry that your food would run out before you got money to buy more?: Yes      Within the past 12 months, did the food you bought just not last and you didn t have money to get more?: No   Transportation Needs: High Risk (11/21/2023)    Transportation Needs      Within the past 12 months, has lack of transportation kept you from medical appointments, getting your medicines, non-medical meetings or appointments, work, or from getting things that you need?: Yes   Physical Activity: Patient Declined (9/1/2023)    Exercise Vital Sign      Days of Exercise per Week: Patient declined      Minutes of Exercise per Session: Patient declined   Recent Concern: Physical Activity - Inactive (6/5/2023)    Exercise Vital Sign      Days of Exercise per Week: 0 days      Minutes of Exercise per Session: 0 min   Stress: Stress Concern Present (9/1/2023)    [employer] - Occupational Stress " "Questionnaire      Feeling of Stress : Rather much   Social Connections: Unknown (9/1/2023)    Social Connection and Isolation Panel [NHANES]      Frequency of Communication with Friends and Family: Twice a week      Frequency of Social Gatherings with Friends and Family: Patient declined      Attends Faith Services: Patient declined      Active Member of Clubs or Organizations: No      Attends Club or Organization Meetings: Never      Marital Status: Patient declined   Interpersonal Safety: Low Risk  (1/30/2024)    Interpersonal Safety      Do you feel physically and emotionally safe where you currently live?: Yes      Within the past 12 months, have you been hit, slapped, kicked or otherwise physically hurt by someone?: No      Within the past 12 months, have you been humiliated or emotionally abused in other ways by your partner or ex-partner?: No   Housing Stability: High Risk (11/21/2023)    Housing Stability      Do you have housing? : Yes      Are you worried about losing your housing?: Yes     Family History   Problem Relation Age of Onset     Attention Deficit Disorder Mother      Alcoholism Mother      Cancer Mother      Alcoholism Father      Alcoholism Sister      Alcoholism Brother      Lab Results   Component Value Date    WBC 6.1 02/26/2024    WBC 25.8 07/11/2021     Lab Results   Component Value Date    RBC 4.36 02/26/2024    RBC 2.34 07/11/2021     Lab Results   Component Value Date    HGB 13.0 02/26/2024    HGB 7.5 07/11/2021     Lab Results   Component Value Date    HCT 38.3 02/26/2024    HCT 22.7 07/11/2021     No components found for: \"MCT\"  Lab Results   Component Value Date    MCV 88 02/26/2024    MCV 97 07/11/2021     Lab Results   Component Value Date    MCH 29.8 02/26/2024    MCH 32.1 07/11/2021     Lab Results   Component Value Date    MCHC 33.9 02/26/2024    MCHC 33.0 07/11/2021     Lab Results   Component Value Date    RDW 12.8 02/26/2024    RDW 22.1 07/11/2021     Lab Results "   Component Value Date     02/26/2024     07/11/2021   Last Comprehensive Metabolic Panel:  Lab Results   Component Value Date     02/26/2024    POTASSIUM 3.6 02/26/2024    CHLORIDE 104 02/26/2024    CO2 23 02/26/2024    ANIONGAP 11 02/26/2024    GLC 99 02/26/2024    BUN 32.9 (H) 02/26/2024    CR 2.68 (H) 02/26/2024    GFRESTIMATED 31 (L) 02/26/2024    JANENE 9.3 02/26/2024           Subjective findings- 33-year-old returns clinic for Onychomycosis, Cavus foot, immobility, peripheral sensory Neuropathy and muscle weakness.  Relates he did get the Loprox cream and intermittently used that and felt it helped some, relates he did not get the AFOs and extra-depth shoes but he may want to get those, relates that he did have a foot injury where he bruised his foot but that is resolved itself and felt like his range of motion was better after it healed, relates he just got a PCA this last week so he is just getting things organized, relates he would like to do pool therapy, had questions regarding whether he can get home therapy for physical therapy, uses an electric chair.    Objective findings- DP and PT are 2 out of 4 bilaterally.  Has a cavus foot type bilaterally.  Has mild dry skin bilaterally.  Has incurvated dystrophic brittle nails with subungual debris thickening and discoloration to differing degrees 1 through 5 bilaterally.  There is no erythema, no drainage, no odor, no calor, no no pain on palpation bilaterally.  Has decreased range of motion bilaterally.    Assessment and plan- Onychomycosis and Onychauxis, cavus foot, immobility with some muscle weakness, peripheral sensory Neuropathy.  Diagnosis and treatment options discussed with the patient.  All the toenails were debrided or reduced bilaterally upon consent.  Patient advised on Loprox cream use.  Prescription for pool therapy given use discussed with the patient.  I discussed with him home therapy but he relates he is not homebound so  we will hold on home physical therapy.  He can get the AFOs and extra-depth shoes at his convenience.  Follow-up with his primary care physician and pain clinic for pain management.  Previous notes reviewed.  Return to clinic and see me in 3 months.                    Low level of medical decision making.      Again, thank you for allowing me to participate in the care of your patient.        Sincerely,        Dev Hill DPM

## 2024-04-17 ENCOUNTER — NURSE TRIAGE (OUTPATIENT)
Dept: NURSING | Facility: CLINIC | Age: 34
End: 2024-04-17
Payer: MEDICARE

## 2024-04-17 ENCOUNTER — TELEPHONE (OUTPATIENT)
Dept: FAMILY MEDICINE | Facility: CLINIC | Age: 34
End: 2024-04-17
Payer: MEDICARE

## 2024-04-17 NOTE — TELEPHONE ENCOUNTER
Patient is requesting to speak with clinic direct.        Reason for Disposition   Health information question, no triage required and triager able to answer question    Additional Information   Negative: Nursing judgment   Negative: Nursing judgment   Negative: Nursing judgment   Negative: Requesting lab results and adult stable (no new symptoms, not worsening)   Negative: Requesting referral to a specialist   Negative: Questions about durable medical equipment ordered and triager unable to answer   Negative: Requesting regular office appointment and adult stable (no new symptoms, not worsening)    Protocols used: Information Only Call - No Triage-A-OH

## 2024-04-17 NOTE — TELEPHONE ENCOUNTER
General Call      Reason for Call:  please call to     What are your questions or concerns:  Patient states that he will be doing a shift change. He states he got a pca started, and people with pca certificates are willing to help him also. He is concerned about a lot of people and all of the releases of info that have been filed, and to know what the hierarchy is for the shift change for his medical routine. He is hoping as discussed in the meeting before. There is 17 operations and things put on hold. He is now going to have to come back and test possibility of a care plan more about comfort ability, and giving the talking stick to PT for pain priority over possible add. If the other providers don't approve with key points. One to use medical marijuana for transplant, and if his transplant teams ar friendly with this for his chronic pain stress and anxiety and complications of gun ownership     Date of last appointment with provider: 4-2-24    Could we send this information to you in [facility] or would you prefer to receive a phone call?:   Patient would prefer a phone call   Okay to leave a detailed message?: Yes at Home number on file [phone] (home)

## 2024-04-17 NOTE — TELEPHONE ENCOUNTER
Called and spoke with Dax.    He has made an appointment with Dr. Gomez to discuss all these issues and have some forms filled out.    He was appreciative of the call back but has no needs today .    Christine M Klisch, RN

## 2024-04-18 ENCOUNTER — OFFICE VISIT (OUTPATIENT)
Dept: FAMILY MEDICINE | Facility: CLINIC | Age: 34
End: 2024-04-18
Attending: FAMILY MEDICINE
Payer: MEDICARE

## 2024-04-18 VITALS
HEIGHT: 67 IN | BODY MASS INDEX: 29.82 KG/M2 | RESPIRATION RATE: 26 BRPM | WEIGHT: 190 LBS | TEMPERATURE: 98 F | DIASTOLIC BLOOD PRESSURE: 68 MMHG | HEART RATE: 95 BPM | OXYGEN SATURATION: 98 % | SYSTOLIC BLOOD PRESSURE: 105 MMHG

## 2024-04-18 DIAGNOSIS — Z99.2 ESRD (END STAGE RENAL DISEASE) ON DIALYSIS (H): ICD-10-CM

## 2024-04-18 DIAGNOSIS — M24.576 CONTRACTURE OF JOINT OF FOOT, UNSPECIFIED LATERALITY: ICD-10-CM

## 2024-04-18 DIAGNOSIS — N18.6 ESRD (END STAGE RENAL DISEASE) ON DIALYSIS (H): ICD-10-CM

## 2024-04-18 DIAGNOSIS — F41.1 GAD (GENERALIZED ANXIETY DISORDER): ICD-10-CM

## 2024-04-18 DIAGNOSIS — F90.8 ATTENTION DEFICIT HYPERACTIVITY DISORDER (ADHD), OTHER TYPE: Primary | ICD-10-CM

## 2024-04-18 PROCEDURE — 99213 OFFICE O/P EST LOW 20 MIN: CPT | Performed by: FAMILY MEDICINE

## 2024-04-18 ASSESSMENT — ANXIETY QUESTIONNAIRES
7. FEELING AFRAID AS IF SOMETHING AWFUL MIGHT HAPPEN: SEVERAL DAYS
6. BECOMING EASILY ANNOYED OR IRRITABLE: MORE THAN HALF THE DAYS
4. TROUBLE RELAXING: MORE THAN HALF THE DAYS
5. BEING SO RESTLESS THAT IT IS HARD TO SIT STILL: SEVERAL DAYS
GAD7 TOTAL SCORE: 12
IF YOU CHECKED OFF ANY PROBLEMS ON THIS QUESTIONNAIRE, HOW DIFFICULT HAVE THESE PROBLEMS MADE IT FOR YOU TO DO YOUR WORK, TAKE CARE OF THINGS AT HOME, OR GET ALONG WITH OTHER PEOPLE: EXTREMELY DIFFICULT
2. NOT BEING ABLE TO STOP OR CONTROL WORRYING: MORE THAN HALF THE DAYS
GAD7 TOTAL SCORE: 12
8. IF YOU CHECKED OFF ANY PROBLEMS, HOW DIFFICULT HAVE THESE MADE IT FOR YOU TO DO YOUR WORK, TAKE CARE OF THINGS AT HOME, OR GET ALONG WITH OTHER PEOPLE?: EXTREMELY DIFFICULT
1. FEELING NERVOUS, ANXIOUS, OR ON EDGE: NEARLY EVERY DAY
7. FEELING AFRAID AS IF SOMETHING AWFUL MIGHT HAPPEN: SEVERAL DAYS
3. WORRYING TOO MUCH ABOUT DIFFERENT THINGS: SEVERAL DAYS

## 2024-04-18 ASSESSMENT — PAIN SCALES - GENERAL: PAINLEVEL: NO PAIN (0)

## 2024-04-18 NOTE — PROGRESS NOTES
Assessment & Plan     Attention deficit hyperactivity disorder (ADHD), other type  Continue with current dose      DIMITRI (generalized anxiety disorder)  Follow up with Psychiatry.    ESRD (end stage renal disease) on dialysis (H)  On home dialysis, follow up with Nephrology.    Contracture of joint of foot, unspecified laterality  Follow up with Podiatry  Start PT on 01/19/2024      Today, patient has no specific concerns.  Discussed, reviewed his past medical history, his chart,  Up dated his record.  Patient reports he was scheduled to see a dentist soon, we will start physical therapy.      David Verduzco is a 33 year old, presenting for the following health issues:  General Health Concerns and MH Follow Up        4/18/2024    10:12 AM   Additional Questions   Roomed by Yomaira DIA CMA   Accompanied by N/A         4/18/2024    10:12 AM   Patient Reported Additional Medications   Patient reports taking the following new medications None     History of Present Illness       CKD: He is not using over the counter pain medicine.     Mental Health Follow-up:  Patient presents to follow-up on Anxiety.    Patient's anxiety since last visit has been:  Bad  The patient is having other symptoms associated with anxiety.  Any significant life events: relationship concerns, financial concerns, housing concerns, health concerns and other  Patient is feeling anxious or having panic attacks.  Patient has no concerns about alcohol or drug use.    Reason for visit:  Follow up and medication discusson. Anxiety, PTSD, chronic pain medication, and ADHD medication change to prioritize comfort. Reason, update on PT and dental appt scheduled and increased activity has increased pain, spasms,sleep loss, n anxiety.    He eats 2-3 servings of fruits and vegetables daily.He consumes 3 sweetened beverage(s) daily.He exercises with enough effort to increase his heart rate 20 to 29 minutes per day.  He exercises with enough effort to  "increase his heart rate 7 days per week. He is missing 1 dose(s) of medications per week.  He is not taking prescribed medications regularly due to other.         Review of Systems  Constitutional, HEENT, cardiovascular, pulmonary, GI, , musculoskeletal, neuro, skin, endocrine and psych systems are negative, except as otherwise noted.      Objective    /68 (BP Location: Right arm, Patient Position: Chair, Cuff Size: Adult Large)   Pulse 95   Temp 98  F (36.7  C) (Oral)   Resp 26   Ht 1.702 m (5' 7\")   Wt 86.2 kg (190 lb)   SpO2 98%   BMI 29.76 kg/m    Body mass index is 29.76 kg/m .  Physical Exam   GENERAL: alert and no distress  PSYCH: mentation appears normal, affect normal/bright    No orders of the defined types were placed in this encounter.           Signed Electronically by: Kole Gomez MD    "

## 2024-04-19 ENCOUNTER — THERAPY VISIT (OUTPATIENT)
Dept: PHYSICAL THERAPY | Facility: CLINIC | Age: 34
End: 2024-04-19
Payer: MEDICARE

## 2024-04-19 DIAGNOSIS — M79.672 CHRONIC PAIN OF BOTH FEET: Primary | ICD-10-CM

## 2024-04-19 DIAGNOSIS — M79.671 CHRONIC PAIN OF BOTH FEET: Primary | ICD-10-CM

## 2024-04-19 DIAGNOSIS — G89.29 CHRONIC PAIN OF BOTH FEET: Primary | ICD-10-CM

## 2024-04-19 PROCEDURE — 97110 THERAPEUTIC EXERCISES: CPT | Mod: GP | Performed by: PHYSICAL THERAPIST

## 2024-04-26 ENCOUNTER — THERAPY VISIT (OUTPATIENT)
Dept: PHYSICAL THERAPY | Facility: CLINIC | Age: 34
End: 2024-04-26
Payer: MEDICARE

## 2024-04-26 DIAGNOSIS — M79.672 CHRONIC PAIN OF BOTH FEET: Primary | ICD-10-CM

## 2024-04-26 DIAGNOSIS — M79.671 CHRONIC PAIN OF BOTH FEET: Primary | ICD-10-CM

## 2024-04-26 DIAGNOSIS — G89.29 CHRONIC PAIN OF BOTH FEET: Primary | ICD-10-CM

## 2024-04-26 PROCEDURE — 97110 THERAPEUTIC EXERCISES: CPT | Mod: GP | Performed by: PHYSICAL THERAPIST

## 2024-05-01 ENCOUNTER — TELEPHONE (OUTPATIENT)
Dept: FAMILY MEDICINE | Facility: CLINIC | Age: 34
End: 2024-05-01

## 2024-05-01 NOTE — TELEPHONE ENCOUNTER
Patient calling regarding his adderall prescription. He stated he had extra XR pills and that he ran out of the 20s but does have the 30s remaining. He does not know how many 30s he has left because he is not at home, he will check to make sure he has enough to get to the next refill on 5/27/24 and call back if he does not have enough.    Patient was talking about politics and that marijuana will be a lower scheduled drug but he does not think the [facility] will honor that. RN had a hard time understanding what patient needed from us.     Patient asking for a message be sent to PCP for another Referral to the provider in [place] to re-certify for medical marijuana. He was talking about writing a letter for PCP to review to send to his transplant team at the [facility] about medical marijuana and wanted to know if PCP wants an appointment to review this letter? RN unsure what this letter would be for. Patient was at dialysis and needed to go.     Thank you,  Kelly Mckeon RN

## 2024-05-01 NOTE — TELEPHONE ENCOUNTER
I put a new referral for him a month ago  Need to call and make an appt.    please call [phone].

## 2024-05-01 NOTE — TELEPHONE ENCOUNTER
May ask  , work with him if able to switch his insurance  Otherwise, need to establish with a new group    Thanks

## 2024-05-13 ENCOUNTER — TELEPHONE (OUTPATIENT)
Dept: FAMILY MEDICINE | Facility: CLINIC | Age: 34
End: 2024-05-13
Payer: MEDICARE

## 2024-05-13 DIAGNOSIS — F90.9 ATTENTION DEFICIT HYPERACTIVITY DISORDER (ADHD), UNSPECIFIED ADHD TYPE: ICD-10-CM

## 2024-05-13 RX ORDER — DEXTROAMPHETAMINE SACCHARATE, AMPHETAMINE ASPARTATE, DEXTROAMPHETAMINE SULFATE AND AMPHETAMINE SULFATE 7.5; 7.5; 7.5; 7.5 MG/1; MG/1; MG/1; MG/1
TABLET ORAL
Qty: 90 TABLET | Refills: 0 | Status: SHIPPED | OUTPATIENT
Start: 2024-05-27 | End: 2024-05-23

## 2024-05-13 NOTE — TELEPHONE ENCOUNTER
Medication Question or Refill    Contacts         Type Contact Phone/Fax    05/13/2024 10:00 AM CDT Phone (Incoming) Dax Villareal (Self) [phone] (M)            What medication are you calling about (include dose and sig)?: amphetamine-dextroamphetamine (ADDERALL) 30 MG tablet (Future)     Preferred Pharmacy:  [id number] - [place], [place] - [address]  [address]  [place] [address]  Phone: [phone] Fax: [phone]      Controlled Substance Agreement on file:   CSA -- Patient Level:    CSA: None found at the patient level.       Who prescribed the medication?:  Kole Gomez      Do you need a refill? Yes    When did you use the medication last? 05/13/24      Patient offered an appointment? No    Do you have any questions or concerns?  Yes: need refill      Could we send this information to you in [facility] or would you prefer to receive a phone call?:   Patient would prefer a phone call   Okay to leave a detailed message?: Yes at Cell number on file:    Telephone Information:   Mobile [phone]

## 2024-05-13 NOTE — TELEPHONE ENCOUNTER
Need 40 minutes a ppt on less. He is complex and comes with many concerns.    Please need 40 minutes appt.    In addition, not sure if [place] accept his insurance anymore /    Thanks

## 2024-05-13 NOTE — TELEPHONE ENCOUNTER
Routing to PCP    RN called patient    Patient would like an in person appt ASAP with PCP regarding these referrals    Is it ok to use your approval required slot for tmrw regarding all of these referrals?    He is only able to do appts on Tues and Thurs due to his dialysis schedule    Please advise    Berenice Monteiro RN

## 2024-05-13 NOTE — TELEPHONE ENCOUNTER
Ok, for early refill    Last filled was on 03/19/2024    I have sent a new refills for today         [place] Prescription Monitoring Program, ( ) referenced. No indication of misuse, or abuse.

## 2024-05-13 NOTE — TELEPHONE ENCOUNTER
There is a current/active script, but start date is not until 5/27/24. Okay for early refill?    Mago Barnett RN

## 2024-05-13 NOTE — TELEPHONE ENCOUNTER
Order/Referral Request    Who is requesting:pt    Orders being requested: physical therapy, aqua therapy, pain clinic,  waver and PCA, gym membership, marijuana program.     Still in the wheelchair a lot, balance is still off and will like some physical therapy.     Could we send this information to you in [facility] or would you prefer to receive a phone call?:   Patient would prefer a phone call   Okay to leave a detailed message?: Yes at Cell number on file:    Telephone Information:   Mobile [phone]

## 2024-05-13 NOTE — TELEPHONE ENCOUNTER
Routing to Dr Gomez.  YUKO.  No response needed.    Huddled with Preethi at .  Preethi checked and the patients insurance is still showing [employer] as the primary and medicaid as a secondary.      I called patient and let him know that we cannot see patient because of his insurance.    SEBASTIAN Hernandez  [facility]

## 2024-05-13 NOTE — TELEPHONE ENCOUNTER
RN called patient/family and relayed provider's message. Patient/family verbalized understanding.     Berenice Monteiro RN, BSN  [facility] [place]

## 2024-05-14 NOTE — TELEPHONE ENCOUNTER
Patient returning call;    States he checked with insurance and [facility] does accept his insurance    Requesting to redo referrals and medication refill     Will schedule appointment with PCP tomorrow     Belen Corrigan RN  [facility]

## 2024-05-15 NOTE — PROGRESS NOTES
GASTROENTEROLOGY PROGRESS NOTE      Date of Admission: 6/28/2021  Reason for Admission: pancreatitis/hepatitis    ASSESSMENT/RECOMMENDATIONS:  30 year old male with a history of heavy alcohol use who presented to OSH 5/19 for abdominal pain, nausea and vomiting, admitted for alcohol hepatitis, ESLD, RADHA on HD, septic shock requiring pressors as well as necrotizing pancreatitis with large evolving necrotic collection. Transferred to [facility] for consideration of drainage of presumed infected necrotic collection. S/p EUS-guided necrosectomy (7/21/21).     #. Acute necrotizing pancreatitis with presumed infected walled off necrotic collection s/p percutaneous and endoscopic drainage   #. Septic shock, resolved  #. Leukocytosis  #. Ascites/Peritonitis  Unclear evolution of pancreatitis history but had a recent CT scan with large evolving necrotic collection, presumed infected. No obvious gas in collection. CT on admission to [facility] showing mature collection with enhancing wall measuring [id number]. With profound coagulopathy (liver disease + malnutrition +sepsis), INR 3.8 on transfer he was given FFP and Vit K, IR placed perc drain 6/29. Repeat imaging 7/18 with still large walled off necrotic collection that appeared amenable to endoscopic transluminal drainage, underwent cystgastrostomy stenting on 7/21/21, near complete debridement/necrosectomy 8/6 with plans for final debridement later this week.  Etiology: alcohol  Date of onset: 5/19  BISAP score on admission: unclear  Concurrent organ failure: respiratory (now extubated), renal (on HD), liver, cardiogenic (weaned off pressors 6/29)  Thromboses: ?possible PE 8/8  Diabetes: no  Current nutrition access: PEG-J + po diet  Last imaging: CT scan with IV contrast 8/5  Infection/antiinfectives:                 - Meropenem (6/29 - 7/18), Ciprofloxacin (7/19 - 7/21), Vancomycin (6/29 - 6/30), Micafungin (6/29 - 7/1), Ceftriaxone (7/21 - 7/23), Linezolid (7/21 - 7/30), Zosyn  (7/27-8/6), Cefepime (8/6 - present), Metronidazole (8/6 - present)   Intervention:    6/30 - Left IR RP perc drain (24F)    7/2 - Diagnostic paracentesis (WBC 2663 - 74% PMN, SAAG <1.1, protein 4.1)     7/5 - Repeat Paracentesis (WBC 1801 - 61% PMN, lipase 48)    7/13 - Diag/therapeutic paracentesis ()    7/13 - PEG-J placement with T-tag gastropexy    7/21 - Repeat paracentesis    7/21 - EUS-cystgastrostomy and necrosectomy, Axios with co-axial Viabil    7/28 - EGD, stent replacement (transluminal Axios with coaxial Solus), PEG-J removal (buried bumper) and replacement of new PEG-J with one new T-tag    8/6 - EGD with necrosectomy and near complete necrosectomy    - Repeat CT scan A/P w IV contrast tomorrow or Wed  - Plan to repeat EGD/necrosectomy later this week (Wed or Thurs)  - Flush drain with 100ml q4hr (not uncommon that the entirety of the flushes will return in gravity bag because of cystgastrostomy presence)  - PRN paracentesis  - Please continue antibiotics for now until debridements are complete -- would not anticipate a need for prophylactic antibiotics (for SBP) after completion of current course  - pain meds per primary team    #. Hematochezia  Two episodes of BRBPR 8/8-8/9. Likely related to oozing after necrosectomy in the setting of new anticoagulation for questionable PE. Hgb stable. Bleeding stopped after anticoagulation was stopped. Would continue to hold anticoagulation for now. Hemodynamics unchanged.    #. Severe malnutrition in the context of chronic illness  #. Gastric outlet obstruction s/p PEG-J placement  #. Mild to moderate EPI  Patient with very poor oral intake over the course of his illness and was not meeting calorie requirements orally. S/p PEG-J with T-tag gastropexy (x3) 7/13. G tube output has decreased with limited oral intake  - G tube to gravity, match I/O as best as possible   - Diet as tolerated  - Continue tube feeds with PERT  - Remove new T-tag (placed 7/28)  "around 8/11    #. Alcohol hepatitis  #. Hyperbilirubinemia  #. Coagulopathy  #. Encephalopathy, improved  Known heavy alcohol use. Reportedly received course of steroids at OSH for 1 week with \"some improvement\". Currently has primarily hyperbilirubinemia and mildly elevated AST. Unclear if he has underlying cirrhosis but certainly would fit with alcohol hepatitis vs elevated liver tests of critical illness/sepsis. Not candidate for steroids right now. He is receiving lactulose and Xifaxin for encephalopathy. Hepatology notified of admission and they have weighed in on liver transplant status 7/22 (Makayla Glass APRN CNP)  - trend MELD labs  - continue lactulose/Xifaxin and titrate to 3 soft stools per day -- please record stool output  - alcohol abstinence  - hepatology team following peripherally    GI AE will continue to follow, call with questions    The patient was seen and discussed and plan agreed upon with GI staff, Dr. Dann Norwood PA-C  Advanced Endoscopy/Pancreaticobiliary GI Service  [facility]  Text Page  ____________________________________________      Subjective: Nursing notes and 24hr events reviewed. Patient had two episodes of bloody stool overnight after starting heparin gtt for possible PE. Abdomen still feels bloated but better. Set up to complete thoracentesis this morning, CAPS team at bedside.    ROS:   4 pt ROS negative unless noted in subjective.     Objective:  Blood pressure 118/68, pulse (!) 121, temperature 97.8  F (36.6  C), temperature source Oral, resp. rate 22, height 1.676 m (5' 5.98\"), weight 75.5 kg (166 lb 7.2 oz), SpO2 99 %.  Gen: Laying in bed. Appears comfortable, fatigued.   HEENT: NCAT. Conjunctiva clear. Sclera + mild icteric, poor dentition  CV: Peripheral perfusion intact  Resp: non labored breathing  Abd: moderate distention, yellow purulent, almost tube feed appearing output in RP perc drain, GJ tube with tube feeds through J " and minimal bilious output in G tube  Skin:  + mild jaundice  Ext: warm, well perfused  Mental status/Psych: alert and oriented, flat affect, appears fatigued    LABS:  BMP  Recent Labs   Lab 08/09/21 0326 08/08/21  0509 08/07/21  0637 08/06/21  2215 08/06/21  0655   * 129* 126*  --  124*   POTASSIUM 3.6 3.9 3.9  --  3.4   CHLORIDE 93* 97 91*  --  90*   JANENE 7.8* 8.0* 8.4*  --  8.3*   CO2 24 22 22  --  24   BUN 19 13 27  --  14   CR 2.81* 2.01* 3.31*  --  2.36*   * 128* 68* 76 86     CBC  Recent Labs   Lab 08/09/21 0326 08/08/21 2241 08/08/21  0509 08/07/21  1409   WBC 17.4* 18.9* 18.8* 16.8*   RBC 2.70* 2.72* 2.70* 2.63*   HGB 8.0* 8.0*  8.0* 8.0* 8.1*   HCT 23.8* 23.7* 24.0* 23.4*   MCV 88 87 89 89   MCH 29.6 29.4 29.6 30.8   MCHC 33.6 33.8 33.3 34.6   RDW 16.2* 16.1* 16.3* 15.8*    267 264 267     INR  Recent Labs   Lab 08/08/21  0509 08/06/21  1002   INR 1.74* 1.68*     LFTs  Recent Labs   Lab 08/09/21 0326 08/08/21  0509 08/07/21  0637 08/06/21  0655   ALKPHOS 121 126 113 132   AST 41 51* 46* 43   ALT 22 24 20 19   BILITOTAL 2.5* 2.8* 3.0* 3.4*   PROTTOTAL 7.1 7.0 7.0 7.8   ALBUMIN 1.6* 1.6* 1.7* 2.0*      PANC  No lab results found in last 7 days.      IMAGING:  Reviewed most recent imaging        Previously Declined (within the last year)

## 2024-05-16 ENCOUNTER — VIRTUAL VISIT (OUTPATIENT)
Dept: PSYCHIATRY | Facility: CLINIC | Age: 34
End: 2024-05-16
Payer: MEDICARE

## 2024-05-16 DIAGNOSIS — F41.1 GAD (GENERALIZED ANXIETY DISORDER): ICD-10-CM

## 2024-05-16 DIAGNOSIS — F32.1 CURRENT MODERATE EPISODE OF MAJOR DEPRESSIVE DISORDER, UNSPECIFIED WHETHER RECURRENT (H): Primary | ICD-10-CM

## 2024-05-16 PROCEDURE — 99214 OFFICE O/P EST MOD 30 MIN: CPT | Mod: 95 | Performed by: NURSE PRACTITIONER

## 2024-05-16 ASSESSMENT — ANXIETY QUESTIONNAIRES
2. NOT BEING ABLE TO STOP OR CONTROL WORRYING: SEVERAL DAYS
GAD7 TOTAL SCORE: 6
7. FEELING AFRAID AS IF SOMETHING AWFUL MIGHT HAPPEN: NOT AT ALL
1. FEELING NERVOUS, ANXIOUS, OR ON EDGE: NEARLY EVERY DAY
4. TROUBLE RELAXING: SEVERAL DAYS
GAD7 TOTAL SCORE: 6
GAD7 TOTAL SCORE: 6
7. FEELING AFRAID AS IF SOMETHING AWFUL MIGHT HAPPEN: NOT AT ALL
5. BEING SO RESTLESS THAT IT IS HARD TO SIT STILL: NOT AT ALL
3. WORRYING TOO MUCH ABOUT DIFFERENT THINGS: NOT AT ALL
8. IF YOU CHECKED OFF ANY PROBLEMS, HOW DIFFICULT HAVE THESE MADE IT FOR YOU TO DO YOUR WORK, TAKE CARE OF THINGS AT HOME, OR GET ALONG WITH OTHER PEOPLE?: EXTREMELY DIFFICULT
6. BECOMING EASILY ANNOYED OR IRRITABLE: SEVERAL DAYS
IF YOU CHECKED OFF ANY PROBLEMS ON THIS QUESTIONNAIRE, HOW DIFFICULT HAVE THESE PROBLEMS MADE IT FOR YOU TO DO YOUR WORK, TAKE CARE OF THINGS AT HOME, OR GET ALONG WITH OTHER PEOPLE: EXTREMELY DIFFICULT

## 2024-05-16 NOTE — PATIENT INSTRUCTIONS
"The [facility]  What to Expect  Here's what to expect in the [facility] Program.   About the program  You'll be meeting with a psychiatric doctor to check your mental health. A psychiatric doctor helps you deal with troubling thoughts and feelings by giving you medicine. They'll make sure you know the plan for your care. You may see them for a long time. When you're feeling better, they may refer you back to seeing your family doctor.   If you have any questions, we'll be glad to talk to you.  About visits  Be open  At your visits, please talk openly about your problems. It may feel hard, but it's the best way for us to help you.  Cancelling visits  If you can't come to your visit, please call us right away at [phone]. If you don't cancel at least 24 hours (1 full day) before your visit, that's \"late cancellation.\"  Not showing up for your visits  Being very late is the same as not showing up. You'll be a \"no show\" if:  You're more than 15 minutes late for a 30-minute (half hour) visit.  You're more than 30 minutes late for a 60-minute (full hour) visit.  If you cancel late or don't show up 2 times within 6 months, we may end your care.  Getting help between visits  If you need help between visits, you can call us Monday to Friday from 8 a.m. to 4:30 p.m. at [phone].  Emergency care  Call 911 or go to the nearest emergency department if your life or someone else's life is in danger.  Call 988 anytime to reach the national Suicide and Crisis hotline.  Medicine refills  To refill your medicine, call your pharmacy. You can also call [facility]'s [facility] at [phone], Monday to Friday, 8 a.m. to 4:30 p.m. It can take 1 to 3 business days to get a refill.   Forms, letters, and tests  You may have papers to fill out, like FMLA, short-term disability, and workability. We can help you with these forms at your visits, but you must have an appointment. You may need more " than 1 visit for this, to be in an intensive therapy program, or both.  Before we can give you medicine for ADHD, we may refer you to get tested for it or confirm it another way.  We may not be able to give you an emotional support animal letter.  We don't do mental health checks ordered by the court.   We don't do mental health testing, but we can refer you to get tested.   Thank you for choosing us for your care.  For informational purposes only. Not to replace the advice of your health care provider. Copyright   2022 [facility]. All rights reserved. Drik [id number] - 12/22      After Visit Summary   Continue medications as prescribed  Have your pharmacy contact us for a refill if you are running low on medications (We may ask you to come into clinic to get a refill from the nurse  No Alcohol or drug use  No driving if sedated  Call the clinic with any questions or concerns   Reach out for help if you feel like hurting yourself or others ([facility] [phone]: [address], [address] or [facility] Suicide Hotline   [phone] , call 911 or go to nearest Emergency room     Crisis Resources:    Present to the Emergency Department as needed or call after hours crisis line at [phone] or [phone].   [place] Crisis Text Line: Text [place] to 467369.  Suicide LifeLine Chat: suicidepreventionlifeline.org/chat/.  National Suicide Prevention Lifeline: 277.899.5884 (TTY: 262.286.7039). Call anytime for help.  (www.suicidepreventionlifeline.org)  National [place] on Mental Illness (www.baljinder.org): 104.151.5416 or 524-073-1774.  Mental Health Association (www.mentalhealth.org): 586.535.5538 or 893-303-2449.       Follow up as directed, for your appointments, per your After Visit Summary Form.

## 2024-05-16 NOTE — PROGRESS NOTES
"  The patient has been notified of following:      \"This virtual  visit will be conducted via a call between you and your physician/provider. We have found that certain health care needs can be provided without the need for a physical exam.  This service lets us provide the care you need virtually/via video   If a prescription is necessary we can send it directly to your pharmacy.  If lab work is needed we can place an order for that and you can then stop by our lab to have the test done at a later time.     Virtual/Video visits are billed at different rates depending on your insurance coverage.Some insurers they may be billed the same as an in-person visit.  Please reach out to your insurance provider with any questions.    Patient has given verbal consent for virtual  visit : Yes      Ho w would you like to obtain your AVS? Mail a copy  If the video visit is dropped, the invitation should be resent by: Send to e-mail at: [email or web address]  Will anyone else be joining your video visit? No            Psychiatric  Out- Patient  Follow Up Progress Note  Date of visit:5/16/2024           Discussion of Care and Treatment Recommendations:   This is a 33 year old male with  past medical history of end-stage renal disease, on hemodialysis 3 times per week.  History of generalized anxiety disorder, history of chronic pain.       PCP: Prescribing stimulants ..      Last visit 03/21/2024.  Recommendation at last visit .  1.Depression : Ambulatory referral to psychology- individual psychotherapy    2.  Depression- Continue Duloxetine 60 mg daily   Anxiety : Hydroxyzine 25 mg TID PRN  Patient and I reviewed diagnosis and treatment plan and patient agrees with following recommendations:  Ongoing education given regarding diagnostic and treatment options with adequate verbalization of understanding  Patient and I reviewed diagnosis and treatment plan and patient agrees with following recommendations:  Ongoing education " given regarding diagnostic and treatment options with adequate verbalization of understanding.  Plan   1.Depression : Ambulatory referral to psychology- individual psychotherapy    2.  Depression- Continue Duloxetine 60 mg daily   Anxiety : Hydroxyzine 25 mg TID PRN  Patient and I reviewed diagnosis and treatment plan and patient agrees with following recommendations:  Ongoing education given regarding diagnostic and treatment options with adequate verbalization of understanding         DIagnoses:     Generalized anxiety disorder  Major depressive disorder untreated  History of ADHD    Patient Active Problem List   Diagnosis    Necrotizing pancreatitis    ESRD (end stage renal disease) on dialysis (H)    Cirrhosis of liver (H)    Muscle weakness    Contracture of joint of foot, unspecified laterality    DIMITRI (generalized anxiety disorder)    Alcohol abuse, in remission    Chronic pain    Pancreatic insufficiency    Anemia in chronic kidney disease    Acquired bilateral pes cavus    Chronic pain of both feet    ADHD             Chief Complaint / Subjective:    Chief complaint: Anxiety    History of Present Illness:   Patient presents with many complaints including-inability to get services from the community that he needs.  Frustrations with not being able to acquire medical marijuana to manage his symptoms.  Other concerns regarding anxiety include relationship concerns, financial concerns, housing concerns and other health-related concerns.  Patient also requesting Xanax states he is willing to know why he is Adderall dose in order to receive Xanax.  We have discussed management of depression and anxiety symptoms and my recommendation to utilize noncontrolled substance to manage both.  Currently on hydroxyzine and duloxetine.  States he utilizes hydroxyzine.  I did offer to discuss other noncontrolled substance options to manage depression and anxiety but he chooses to not pursue those options.  He did however  "express that he was disappointed that we cannot prescribe Xanax.  His PCP is currently prescribing Adderall short acting 90 mg daily and recently added propranolol to manage anxiety        Answers submitted by the patient for this visit:  DIMITRI-7 (Submitted on 5/16/2024)  DIMITRI 7 TOTAL SCORE: 6    Mental Status Examination:   Appearance: Well groomed, good eye contact   Orientation: Patient alert and oriented to person, place, time, and situation  Reliability:  Patient appears to be an adequate historian.    Behavior: cooperative   Speech: Speech is spontaneous and coherent, with a normal rate, rhythm and tone.    Language:There are no difficulties with expressive or receptive language as observed throughout the interview.    Mood: Described as \"ok\".    Affect: congruent   Judgement: Able to make basic decision regarding safety.  Insight: Good awareness of physical and mental health conditions and aware of needs around care for these.  Gait and station: unable to assess  Thought process: Logical   Thought content: No evidence of delusions or paranoia.    Hallucinations : No evidence of any hallucination  Thought content: No evidence of delusions or paranoia.   Suicidal /Homical Ideations:  No thoughts of self harm or suicide. No thoughts of harming others.  Associations: Connected  Fund of knowledge: Average  Attention / Concentration: Able to remain focused during the interview with minimal distractibility or need for redirection.  Short Term Memory: Grossly intact as evidence by client recalling themes and ideas discussed.  Long Term Memory: Intact  Motor Status: unable to asse    Drug/treatment history and current pattern of use:   Drug/treatment history and current pattern of use:   Drug of choice: Alcohol   Last alcohol use : 2021  History of alcohol abuse and history of cirrhosis and pancreatis and kidney failure   Nicotine -1/2 pack a day   Cannabis- Denies   Denies use of all other mood altering substances  "       Medication changes: See Above   Medication adherence: compliant  Medication side effects: absent  Information about medications: Side effects, benefits and alternative treatments discussed and patient agrees .    Psychotherapy: Supportive therapy day-to-day living    Education: Diet, exercise, abstinence from drugs and alcohol, patient will not drive if sedated and medications or  under influence of any substance    Lab Results:   Personally reviewed and discussed with the patient    Lab Results   Component Value Date    WBC 6.1 02/26/2024    HGB 13.0 (L) 02/26/2024    HCT 38.3 (L) 02/26/2024     02/26/2024    CHOL 260 (H) 01/30/2024    TRIG 211 (H) 01/30/2024    HDL 38 (L) 01/30/2024    ALT 29 10/19/2023    AST 26 10/19/2023     02/26/2024    BUN 32.9 (H) 02/26/2024    CO2 23 02/26/2024    TSH 1.59 06/28/2022    PSA 0.49 06/28/2022    INR 1.11 10/19/2023       Vital signs:  There were no vitals taken for this visit.  Telemedicine visit-no vital signs completed  Allergies: Patient has no known allergies.         Medications:     Current Outpatient Medications   Medication Sig Dispense Refill    DULoxetine (CYMBALTA) 60 MG capsule Take 1 capsule (60 mg) by mouth daily 90 capsule 0    hydrOXYzine HCl (ATARAX) 25 MG tablet Take 1 tablet (25 mg) by mouth 3 times daily as needed for itching 90 tablet 0    acetaminophen (TYLENOL) 325 MG/10.15ML solution 10.15 mLs (325 mg) by Oral or PEGJ tube route every 4 hours as needed for mild pain or fever      [START ON 5/27/2024] amphetamine-dextroamphetamine (ADDERALL) 30 MG tablet 2 tab in AM and one tab 2 PM. 90 tablet 0    Cetaphil Moisturizing (CETAPHIL) external lotion Apply topically 2 times daily as needed for dry skin or irritation (Apply to chest)      ciclopirox (LOPROX) 0.77 % cream Apply topically 2 times daily To feet and toenails. 90 g 5    diclofenac (VOLTAREN) 1 % topical gel Apply 2 gm to knees and feet qid as needed 150 g 0    folic acid  (FOLVITE) 1 MG tablet 1 tablet (1 mg) by Oral or Feeding Tube route daily (Patient not taking: Reported on 7/20/2023) 90 tablet 3    Guar Gum (FIBER MODULAR, NUTRISOURCE FIBER,) packet 1 packet by Per Feeding Tube route 3 times daily (Patient not taking: Reported on 7/20/2023)      lidocaine (XYLOCAINE) 5 % external ointment Apply topically as needed for moderate pain (qid as needed) Apply to feet and knees qid for 10 days, then as needed 240 g 0    lipase-protease-amylase (CREON 24) 27014-16246-009366 units CPEP per EC capsule 1-2 capsules by Per Feeding Tube route every 4 hours 360 capsule 1    midodrine (PROAMATINE) 10 MG tablet One tab before Dialysis 30 tablet 0    midodrine (PROAMATINE) 5 MG tablet 2 tablets (10 mg) by Per Feeding Tube route 3 times daily 270 tablet 3    naloxone (NARCAN) 4 MG/0.1ML nasal spray Spray 1 spray (4 mg) into one nostril alternating nostrils as needed for opioid reversal every 2-3 minutes until assistance arrives 0.2 mL 0    nortriptyline (PAMELOR) 25 MG capsule Take 25 mg by mouth at bedtime PRN      pantoprazole (PROTONIX) 40 MG EC tablet Take 1 tablet (40 mg) by mouth daily 90 tablet 2    pregabalin (LYRICA) 300 MG capsule TAKE 1 CAPSULE BY MOUTH TWICE DAILY. 180 capsule 3    prochlorperazine (COMPAZINE) 5 MG tablet Take 1 tablet (5 mg) by mouth every 8 hours as needed for nausea or vomiting 90 tablet 3    propranolol (INDERAL) 10 MG tablet Take 1 tablet (10 mg) by mouth 3 times daily as needed (as needed) 90 tablet 0    sevelamer carbonate, RENVELA, 0.8 GM PACK Packet 2 packets (1.6 g) by Oral or Feeding Tube route 3 times daily      silver nitrate (ARZOL) 75-25 % miscellaneous Apply topically daily as needed for wound care      sodium bicarbonate 325 MG tablet 1 tablet (325 mg) by Per Feeding Tube route every 4 hours (Patient not taking: Reported on 7/20/2023)      tiZANidine (ZANAFLEX) 2 MG tablet Take 1 tablet (2 mg) by mouth 3 times daily as needed for muscle spasms (as  needed) 90 tablet 11    torsemide (DEMADEX) 20 MG tablet 2 tab daily 180 tablet 3     No current facility-administered medications for this visit.       No current facility-administered medications for this visit.     No current facility-administered medications for this visit.         Medication adherence: Reviewed risk/benefits of medication , Patient able to verbalize understanding of side effects and Patient verbally consents to taking medications      PSYCHOEDUCATION:  Medication side effects and alternatives reviewed. Health promotion activities recommended and reviewed today. All questions addressed. Education and counseling completed regarding risks and benefits of medications and psychotherapy options.  Consent provided by patient/guardian  Call the psychiatric nurse line with medication questions or concerns at [phone].  Cedar Bookshart may be used to communicate with your provider, but this is not intended to be used for emergencies.  SEROTONIN SYNDROME:  Discussed risks of Serotonin syndrome (ie, serotonin toxicity) which is a potentially life-threatening condition associated with increased serotonergic activity in the central nervous system (CNS). It is seen with therapeutic medication use, inadvertent interactions between drugs, and intentional self-poisoning. Serotonin syndrome may involve a spectrum of clinical findings, which often include mental status changes, autonomic hyperactivity, and neuromuscular abnormalities.    STIMULANT THERAPY: Side effects discussed including but not limited to cardiac (including HTN, tachycardia, sudden death), motor/tic, appetite/growth, mood lability and sleep disruption. This is a controlled substance with risk for abuse, need to keep in a safe keep place and cannot replace lost scripts  HARM REDUCTION:  Discussions regarding effects of mood altering substances, alcohol and cannabis, on mood and that approach is harm reduction, will continue to prescribe meds as they  work to cut back use.    SAFETY:  We all care about your loved one's safety. To reduce the risk of self-harm, remove access to all:  Firearms, Medicines (both prescribed and over-the-counter), Knives and other sharp objects, Ropes and like materials, and Alcohol  SLEEP HYGIENE: establish a sleep routine, limit screen time 1 hour prior to bed, use bed for sleep only, take sleep/medications on time (including sleepy time tea, trazadone or herbal treatments such as melatonin), aroma therapy, limit caffeine/sugar, yoga, guided imagery, stretch, meditation, limit naps to 20 minutes, make a temperature change in the room, white noise, be mindful of slowing down breathing, take a warm bath/shower, frequently wash sheets, and journaling.   Medlineplus.gov is information for patients.  It is run by the [employer] and it contains information about all disorders, diseases and all medications.              Review of Systems:      ROS:    Subjective Data Only- Tele-Health Visit    10 point ROS was negative except for the items listed in HPI.      Crisis Resources:    Present to the Emergency Department as needed or call after hours crisis line at [phone] or [phone].   [place] Crisis Text Line: Text [place] to 213592.  Suicide LifeLine Chat: suicidepreventionAssemblageline.org/chat/.  National Suicide Prevention Lifeline: 768.773.2701 (TTY: 349.166.3262). Call anytime for help.  (www.suicidepreventionlifeline.org)  National [place] on Mental Illness (www.baljinder.org): 421.750.6239 or 658-825-3458.  Mental Health Association (www.mentalhealth.org): 303.167.7974 or 820-952-7406.      Coordination of Care:   More than 30 minutes spent on this visit  with more than 50% of time spent on coordination of care including: Educating patient about diagnosis, prognosis, side effects and benefits of medications, diet, exercise.  Time also spent providing supportive therapy regarding above issues.    Video-Visit  Details    Type of service:  Video Visit    Originating Location (pt. Location): Home    Distant Location (provider location): Providers Remote Office     Platform used for Video Visit: La      Disclaimer: This note consists of symbols derived from keyboarding, dictation and/or voice recognition software. As a result, there may be errors in the script that have gone undetected. Please consider this when interpreting information found in this chart.    Start Time : 1100  End time : 1130

## 2024-05-16 NOTE — NURSING NOTE
Is the patient currently in the state of [place]? YES    Visit mode:VIDEO    If the visit is dropped, the patient can be reconnected by: VIDEO VISIT: Text to cell phone:   Telephone Information:   Mobile [phone]       Will anyone else be joining the visit? NO  (If patient encounters technical issues they should call [phone]    How would you like to obtain your AVS? MyChart    Are changes needed to the allergy or medication list? Pt stated no changes to allergies and Pt stated no med changes    Are refills needed on medications prescribed by this physician? NO    Reason for visit: NIKKI MULLEN

## 2024-05-16 NOTE — TELEPHONE ENCOUNTER
Pt is scheduled to discuss referrals       Has enough meds till the 21st the day of her appt    Will discuss then in-regards to his meds         Azalea Bell    [facility]

## 2024-05-21 ENCOUNTER — OFFICE VISIT (OUTPATIENT)
Dept: FAMILY MEDICINE | Facility: CLINIC | Age: 34
End: 2024-05-21
Payer: MEDICAID

## 2024-05-21 VITALS
HEART RATE: 106 BPM | OXYGEN SATURATION: 95 % | DIASTOLIC BLOOD PRESSURE: 64 MMHG | SYSTOLIC BLOOD PRESSURE: 96 MMHG | TEMPERATURE: 98.2 F

## 2024-05-21 DIAGNOSIS — M21.6X2 ACQUIRED BILATERAL PES CAVUS: ICD-10-CM

## 2024-05-21 DIAGNOSIS — R26.89 IMPAIRED GAIT AND MOBILITY: ICD-10-CM

## 2024-05-21 DIAGNOSIS — M21.6X1 ACQUIRED BILATERAL PES CAVUS: ICD-10-CM

## 2024-05-21 DIAGNOSIS — F41.1 GAD (GENERALIZED ANXIETY DISORDER): ICD-10-CM

## 2024-05-21 DIAGNOSIS — M24.576 CONTRACTURE OF JOINT OF FOOT, UNSPECIFIED LATERALITY: Primary | ICD-10-CM

## 2024-05-21 PROCEDURE — 99213 OFFICE O/P EST LOW 20 MIN: CPT | Performed by: FAMILY MEDICINE

## 2024-05-21 NOTE — PROGRESS NOTES
Assessment & Plan     Contracture of joint of foot, unspecified laterality  Need a new referral for PT  - Cane Order for DME - ONLY FOR DME  - Physical Therapy  Referral; Future    Impaired gait and mobility    - Physical Therapy  Referral; Future    DIMITRI (generalized anxiety disorder)  Patient would like to see it, a new Psychiatry for second opinion.  He feels current medications is not helping with his anxiety, he would like to discuss with a different psychiatrist if possible he could be started on a low-dose of Xanax to take at night as needed  - Adult Mental Health  Referral; Future    Acquired bilateral pes cavus  Referred to PT  Was seen by Podiatry.        David Verduzco is a 33 year old, presenting for the following health issues:  Referral        5/21/2024     2:28 PM   Additional Questions   Roomed by thais zuleta ma     History of Present Illness       CKD: He is not using over the counter pain medicine.     Mental Health Follow-up:  Patient presents to follow-up on Anxiety.    Patient's anxiety since last visit has been:  Worse  The patient is having other symptoms associated with anxiety.  Any significant life events: relationship concerns, grief or loss and health concerns  Patient is feeling anxious or having panic attacks.  Patient has no concerns about alcohol or drug use.    Reason for visit:  Requested by Team for periodic medication list review and transition treatment to appropriate stage    He eats 0-1 servings of fruits and vegetables daily.He consumes 3 sweetened beverage(s) daily.He exercises with enough effort to increase his heart rate 30 to 60 minutes per day.  He exercises with enough effort to increase his heart rate 7 days per week.   He is taking medications regularly.           Review of Systems  Constitutional, HEENT, cardiovascular, pulmonary, GI, , musculoskeletal, neuro, skin, endocrine and psych systems are negative, except as otherwise noted.       Objective    BP 96/64 (BP Location: Right arm, Patient Position: Chair, Cuff Size: Adult Large)   Pulse 106   Temp 98.2  F (36.8  C) (Oral)   SpO2 95%   There is no height or weight on file to calculate BMI.  Physical Exam   GENERAL: alert and no distress  PSYCH: mentation appears normal, affect normal/bright    Orders Placed This Encounter   Procedures    Physical Therapy  Referral    Adult Mental Health  Referral    Cane Order for DME - ONLY FOR DME            Signed Electronically by: Kole Gomez MD

## 2024-05-23 DIAGNOSIS — F90.9 ATTENTION DEFICIT HYPERACTIVITY DISORDER (ADHD), UNSPECIFIED ADHD TYPE: ICD-10-CM

## 2024-05-24 RX ORDER — DEXTROAMPHETAMINE SACCHARATE, AMPHETAMINE ASPARTATE, DEXTROAMPHETAMINE SULFATE AND AMPHETAMINE SULFATE 7.5; 7.5; 7.5; 7.5 MG/1; MG/1; MG/1; MG/1
TABLET ORAL
Qty: 90 TABLET | Refills: 0 | Status: SHIPPED | OUTPATIENT
Start: 2024-05-27 | End: 2024-06-25

## 2024-05-29 ENCOUNTER — TELEPHONE (OUTPATIENT)
Dept: FAMILY MEDICINE | Facility: CLINIC | Age: 34
End: 2024-05-29
Payer: MEDICARE

## 2024-05-29 DIAGNOSIS — M24.576 CONTRACTURE OF JOINT OF FOOT, UNSPECIFIED LATERALITY: Primary | ICD-10-CM

## 2024-05-29 RX ORDER — BACLOFEN 10 MG/1
10 TABLET ORAL 3 TIMES DAILY
Qty: 90 TABLET | Refills: 2 | Status: SHIPPED | OUTPATIENT
Start: 2024-05-29

## 2024-05-29 NOTE — TELEPHONE ENCOUNTER
Please call pt and cancel his appt for tomorrow, I will not prescript Xanax for him.  I have this discussion with him on many multiple times.  Hi previous psychiatry did not recommend Xanax.    Continue with his current medications  Was referred to see a different psychiatry.    thanks

## 2024-05-29 NOTE — TELEPHONE ENCOUNTER
Writer and PCP have secure chatted, PCP states that he has had this conversation with patient many times and psychiatry is not recommending. PCP asking to please call patient and cancel his appointment.    Routing to RN team to please cancel patient's appointment tomorrow.    Thanks,  DAMASO Aldridge RN  [facility]

## 2024-05-29 NOTE — TELEPHONE ENCOUNTER
"Patient returned call to clinic, writer relayed provider's message as written.    Patient went on to have a very long phone call with writer and describe at length his feelings regarding his care team and the \"legalities of the system\". Writer actively listened to patient.    Patient states that he feels his last psychiatrist (Dr. Valdivia) was to \"push a narrative\" that patient was a drug seeker. States that he feels he was lead on to believe from psychiatrist that the only reason he could not be on xanax was due to ADHD stimulant use, only to find out that they \"had a bias and were trying to push a narrative\". Patient states that he would like to have his most recent psychiatrist expunged from his record.     States that he attempted to seek a second opinion and they had attempted to schedule him with the same psychiatrist, noted that the only other opening was for October. Writer discussed that outside psychiatrist could be an option and discussed that if patient would like to go this route our team could send the referral to them if patient would like.    Patient continued to discuss that he feels that he needs \"an executive \" to work with all members of his care team to assist in making the right medical decisions that act together. Patient states that he has mistrust in some medical practices, notes that he feels Dr. Gomez does care about his wellbeing but is being pushed into a corner with \"legalities\" regarding the xanax. Throughout discussion of care team patient then stated that he won't bring politics up but feels that amongst democrats and republicans no one is on the same page and this goes for his care team as well, states that everyone should be on the \"same page, it doesn't matter what this country says, my health should be most important\".    Patient requested a video visit with Dr. Gomez to discuss this matter further, scheduled for tomorrow 5/30/24. States that if his care team cannot get on the " "same page he will consider switching to seek this from a different health system, stating \" I keep going to the same spot and expecting a different result\".    Patient would like message sent to provider asking that if he were to have a second opinion from elsewhere would Xanax then be an option Dr. Gomez would agree with? Patient asking if there is something to have in the meantime as a \"temporary approval for what is already assigned to [him]\"? Writer advised that this message can be sent to Dr. Gomez but writer cannot guarantee these will be questions that provider will be able to answer as he has already referred to psychiatry on this matter.    Writer did offer patient relations number to further discuss care concerns at the end of call, patient declined and states that \"they are too broad and have not helped in the past\".      CB#: [phone], sami ok    Thanks,  DAMASO Aldridge RN  [facility]            "

## 2024-05-29 NOTE — TELEPHONE ENCOUNTER
Tc called relayed message patient is not very happy,    Patients Wants  to call him an explain why he will not be giving this medication, Tc tried explaining to him that this is not the first that the provider has not been okay prescribing this medication to him in th past as well,he want the PCP to call him and apologizes to him for making him go through all these loops and not get no help nowhere he gets referred to, if patient does not hear from PCP today by 5:30 he will be dismisisng him  from being his PCP.      Patient also stated that he Wants to cancel all future appts within [place], and [facility]       Closing encounter canceling appt for tomorrow

## 2024-05-29 NOTE — TELEPHONE ENCOUNTER
Pt calling to state that he does not have an appointment with him new mental health provider until 10/2024 due to availability. Pt is asking if pcp is willing to start him on a low-dose of Xanax to take at night as needed. He likes the cymbalta and wonders if 0.5mg xanax as needed for sleep can be helpful. Pt is also asking for something more than tizanidine for muscle relaxer for ankles(pt aware that podiatry may manage this and asking for podiatry referral if pcp prefers).     Pt's PCA services are over and now getting transferred to [facility] for light cleaning. Pt  recently had 7 teeth removed and has been accommodating to this.     Pt discussed with RN that his Mental health provider has not been accommodating to his requests. Pt has complied with mental health provider and tried alternatives per their request with no relief of symptoms. Pcp is aware of this.     Pt also disclosed with RN that he was Approved for medical marijuana twice now and didn't go through with it due to legality and being on the transplant list. Pt states Maybe try medial marijuana in the future in lieu of xanax once legality issues resolve.     If agreeable, Do you want an appointment to discuss medications?    Routing to pcp to advise.     Melyssa Jean, RN on 5/29/2024 at 11:53 AM

## 2024-05-29 NOTE — TELEPHONE ENCOUNTER
Attempt # 1 to reach patient to relay provider message.    Left VM to return call to clinic.    Christine M Klisch, RN

## 2024-05-29 NOTE — TELEPHONE ENCOUNTER
I cannot, prescribe Xanax for him.  I had this discussion with him on many multiple visits.  His previous psychiatry did not recommend him to be on Xanax.    I did send Baclofen for him for muscle spasm    Discontinue zanaflex.    Thanks

## 2024-06-15 DIAGNOSIS — G62.9 PERIPHERAL POLYNEUROPATHY: ICD-10-CM

## 2024-06-15 DIAGNOSIS — F41.1 GAD (GENERALIZED ANXIETY DISORDER): ICD-10-CM

## 2024-06-17 RX ORDER — HYDROXYZINE HYDROCHLORIDE 25 MG/1
TABLET, FILM COATED ORAL
Qty: 90 TABLET | Refills: 0 | Status: SHIPPED | OUTPATIENT
Start: 2024-06-17

## 2024-06-17 RX ORDER — PREGABALIN 300 MG/1
CAPSULE ORAL
Qty: 180 CAPSULE | Refills: 0 | Status: SHIPPED | OUTPATIENT
Start: 2024-06-17

## 2024-06-25 DIAGNOSIS — F90.9 ATTENTION DEFICIT HYPERACTIVITY DISORDER (ADHD), UNSPECIFIED ADHD TYPE: ICD-10-CM

## 2024-06-25 NOTE — TELEPHONE ENCOUNTER
Patient is needing a refill of the pended medication.    SEBASTIAN Hernandez  [facility]

## 2024-06-26 RX ORDER — DEXTROAMPHETAMINE SACCHARATE, AMPHETAMINE ASPARTATE, DEXTROAMPHETAMINE SULFATE AND AMPHETAMINE SULFATE 7.5; 7.5; 7.5; 7.5 MG/1; MG/1; MG/1; MG/1
TABLET ORAL
Qty: 90 TABLET | Refills: 0 | Status: SHIPPED | OUTPATIENT
Start: 2024-06-28 | End: 2024-06-27

## 2024-06-26 NOTE — TELEPHONE ENCOUNTER
Refill on Adderall 30 mg tab, last refilled on 05/28/2024  Next refill on 06/28/2024.         [place] Prescription Monitoring Program, ( ) referenced. No indication of misuse, or abuse.

## 2024-07-05 ENCOUNTER — TELEPHONE (OUTPATIENT)
Dept: FAMILY MEDICINE | Facility: CLINIC | Age: 34
End: 2024-07-05
Payer: MEDICARE

## 2024-07-05 NOTE — TELEPHONE ENCOUNTER
Red flag call:     Patient called in reporting he is currently in [place] and injured his ankle today.  He went to  this afternoon (please see Care Everywhere for details), had x-rays done but they told him he needed to go to the ER for a CT scan and they were unable to help him further at .  He's asking if he should go to the ER or if he can wait another 24 hours to get back to [place] and be seen here.  States he's also missed one session of dialysis while out of town and unable to take much for OTC medication due to cirrhosis and kidney disease.      RN informed patient unable to triage out of state due to licensure restrictions, and recommended patient follow direction of  provider he saw today and be seen in the ER.  Patient verbalized understanding and will discuss with family.      Lizzette Argueta RN  [facility]

## 2024-07-09 ENCOUNTER — VIRTUAL VISIT (OUTPATIENT)
Dept: FAMILY MEDICINE | Facility: CLINIC | Age: 34
End: 2024-07-09
Payer: MEDICARE

## 2024-07-09 DIAGNOSIS — Z99.2 ESRD (END STAGE RENAL DISEASE) ON DIALYSIS (H): ICD-10-CM

## 2024-07-09 DIAGNOSIS — M21.6X2 ACQUIRED BILATERAL PES CAVUS: ICD-10-CM

## 2024-07-09 DIAGNOSIS — S92.314A CLOSED NONDISPLACED FRACTURE OF FIRST METATARSAL BONE OF RIGHT FOOT, INITIAL ENCOUNTER: Primary | ICD-10-CM

## 2024-07-09 DIAGNOSIS — M21.6X1 ACQUIRED BILATERAL PES CAVUS: ICD-10-CM

## 2024-07-09 DIAGNOSIS — N18.6 ESRD (END STAGE RENAL DISEASE) ON DIALYSIS (H): ICD-10-CM

## 2024-07-09 DIAGNOSIS — M24.576 CONTRACTURE OF JOINT OF FOOT, UNSPECIFIED LATERALITY: ICD-10-CM

## 2024-07-09 DIAGNOSIS — M25.562 CHRONIC PAIN OF LEFT KNEE: ICD-10-CM

## 2024-07-09 DIAGNOSIS — G89.29 CHRONIC PAIN OF LEFT KNEE: ICD-10-CM

## 2024-07-09 PROCEDURE — 99443 PR PHYSICIAN TELEPHONE EVALUATION 21-30 MIN: CPT | Mod: 95 | Performed by: FAMILY MEDICINE

## 2024-07-09 RX ORDER — HYDROCODONE BITARTRATE AND ACETAMINOPHEN 5; 325 MG/1; MG/1
TABLET ORAL
COMMUNITY
Start: 2024-07-06 | End: 2024-07-09

## 2024-07-09 RX ORDER — LIDOCAINE 50 MG/G
OINTMENT TOPICAL PRN
Qty: 240 G | Refills: 0 | Status: SHIPPED | OUTPATIENT
Start: 2024-07-09 | End: 2024-07-23

## 2024-07-09 NOTE — PROGRESS NOTES
Dax is a 33 year old who is being evaluated via a billable telephone visit.  How would you like to obtain your AVS? MyChart  If the video visit is dropped, the invitation should be resent by: Text to cell phone: [phone]  Will anyone else be joining your video visit? No      Assessment & Plan     Closed nondisplaced fracture of first metatarsal bone of right foot, initial encounter  Continue with walking boot  Elevation, apply ice  Continue with Tylenol as needed  Continue with Pregabalin, Baclofen, Duloxetine and topical Lidocaine as needed.  - Orthopedic  Referral; Future    Acquired bilateral pes cavus    - lidocaine (XYLOCAINE) 5 % external ointment; Apply topically as needed for moderate pain (qid as needed) Apply to feet and knees qid for 10 days, then as needed    Chronic pain of left knee    - lidocaine (XYLOCAINE) 5 % external ointment; Apply topically as needed for moderate pain (qid as needed) Apply to feet and knees qid for 10 days, then as needed    Contracture of joint of foot, unspecified laterality  Follow up with Podiatry.    ESRD (end stage renal disease) on dialysis (H)  On Dialysis    Patient was seen in the ER, in [facility] [place], [place] for right foot pain,    Reviewed his ER visit, notes, imaging.  He had x-rays, CT scan.  CT scan showed possible nondisplaced first metatarsal fracture.  Patient was fitted with a cam walker, reports no chills been wearing.    Previously, patient was seen by podiatry, for his chronic Contracture of feet, bilateral Pes Cavus.    Patient reports he has been working with a new PCA.  He does have a few forms need to be filled,  [employer], form need to be filled.  He will fax the form to us.  In addition,  in the future he may need to have a handicap parking application/ form and a letter for supporting animal.    .  Discussed with patient that I will fill these forms, letter , upon his request in the future.    Patient will follow-up  with his podiatry for his first metatarsal fracture.  He will continue with walking boots.        MED REC REQUIRED  Post Medication Reconciliation Status: discharge medications reconciled and changed, per note/orders    Work on weight loss  Regular exercise      David Verduzco is a 33 year old, presenting for the following health issues:  ER F/U        7/9/2024     2:00 PM   Additional Questions   Roomed by thais zuleta ma         7/9/2024     2:00 PM   Patient Reported Additional Medications   Patient reports taking the following new medications hydrocodone-acetaminophen for his foot but finished that         HPI     ED/ Followup:    Facility:  [facility]  Date of visit: 7/5/24  Reason for visit: right foot injury/pain  Current Status: Still in a large amount of pain      Per patient he received a letter in regrads to getting a form filled out about getting a new special needs transportation but has not received any forms yet.        Review of Systems  Constitutional, neuro, ENT, endocrine, pulmonary, cardiac, gastrointestinal, genitourinary, musculoskeletal, integument and psychiatric systems are negative, except as otherwise noted.      Objective           Vitals:  No vitals were obtained today due to virtual visit.    Physical Exam   Telephone visit.    Orders Placed This Encounter   Procedures    Orthopedic  Referral          Video-Visit Details    Type of service:  Telephone Visit   Video End Time:  Originating Location (pt. Location):     Distant Location (provider location):  On-site  Platform used for Video Visit: La  Signed Electronically by: Kole Gomez MD

## 2024-07-10 ENCOUNTER — TELEPHONE (OUTPATIENT)
Dept: PODIATRY | Facility: CLINIC | Age: 34
End: 2024-07-10

## 2024-07-10 ENCOUNTER — APPOINTMENT (OUTPATIENT)
Dept: GENERAL RADIOLOGY | Facility: CLINIC | Age: 34
End: 2024-07-10
Attending: EMERGENCY MEDICINE
Payer: MEDICARE

## 2024-07-10 ENCOUNTER — TELEPHONE (OUTPATIENT)
Dept: FAMILY MEDICINE | Facility: CLINIC | Age: 34
End: 2024-07-10

## 2024-07-10 ENCOUNTER — HOSPITAL ENCOUNTER (EMERGENCY)
Facility: CLINIC | Age: 34
Discharge: HOME | End: 2024-07-10
Attending: EMERGENCY MEDICINE | Admitting: EMERGENCY MEDICINE
Payer: MEDICARE

## 2024-07-10 ENCOUNTER — TELEPHONE (OUTPATIENT)
Dept: FAMILY MEDICINE | Facility: CLINIC | Age: 34
End: 2024-07-10
Payer: MEDICARE

## 2024-07-10 VITALS
HEART RATE: 100 BPM | DIASTOLIC BLOOD PRESSURE: 77 MMHG | TEMPERATURE: 98.2 F | SYSTOLIC BLOOD PRESSURE: 112 MMHG | RESPIRATION RATE: 20 BRPM | OXYGEN SATURATION: 98 %

## 2024-07-10 DIAGNOSIS — M79.671 RIGHT FOOT PAIN: ICD-10-CM

## 2024-07-10 DIAGNOSIS — G89.29 CHRONIC PAIN OF LEFT KNEE: Primary | ICD-10-CM

## 2024-07-10 DIAGNOSIS — M25.562 CHRONIC PAIN OF LEFT KNEE: Primary | ICD-10-CM

## 2024-07-10 DIAGNOSIS — R26.89 IMPAIRED GAIT AND MOBILITY: ICD-10-CM

## 2024-07-10 DIAGNOSIS — S92.314A CLOSED NONDISPLACED FRACTURE OF FIRST METATARSAL BONE OF RIGHT FOOT, INITIAL ENCOUNTER: ICD-10-CM

## 2024-07-10 DIAGNOSIS — M24.576 CONTRACTURE OF JOINT OF FOOT, UNSPECIFIED LATERALITY: ICD-10-CM

## 2024-07-10 PROCEDURE — 73630 X-RAY EXAM OF FOOT: CPT | Mod: 26 | Performed by: RADIOLOGY

## 2024-07-10 PROCEDURE — 73630 X-RAY EXAM OF FOOT: CPT | Mod: RT

## 2024-07-10 PROCEDURE — 250N000013 HC RX MED GY IP 250 OP 250 PS 637: Performed by: EMERGENCY MEDICINE

## 2024-07-10 PROCEDURE — 99284 EMERGENCY DEPT VISIT MOD MDM: CPT | Performed by: EMERGENCY MEDICINE

## 2024-07-10 PROCEDURE — 99283 EMERGENCY DEPT VISIT LOW MDM: CPT | Performed by: EMERGENCY MEDICINE

## 2024-07-10 RX ORDER — HYDROCODONE BITARTRATE AND ACETAMINOPHEN 5; 325 MG/1; MG/1
1 TABLET ORAL ONCE
Status: COMPLETED | OUTPATIENT
Start: 2024-07-10 | End: 2024-07-10

## 2024-07-10 RX ORDER — HYDROCODONE BITARTRATE AND ACETAMINOPHEN 5; 325 MG/1; MG/1
1 TABLET ORAL EVERY 6 HOURS PRN
Qty: 5 TABLET | Refills: 0 | Status: SHIPPED | OUTPATIENT
Start: 2024-07-10 | End: 2024-07-13

## 2024-07-10 RX ADMIN — HYDROCODONE BITARTRATE AND ACETAMINOPHEN 1 TABLET: 5; 325 TABLET ORAL at 21:15

## 2024-07-10 ASSESSMENT — ACTIVITIES OF DAILY LIVING (ADL)
ADLS_ACUITY_SCORE: 43
ADLS_ACUITY_SCORE: 43
ADLS_ACUITY_SCORE: 41
ADLS_ACUITY_SCORE: 41

## 2024-07-10 ASSESSMENT — COLUMBIA-SUICIDE SEVERITY RATING SCALE - C-SSRS
6. HAVE YOU EVER DONE ANYTHING, STARTED TO DO ANYTHING, OR PREPARED TO DO ANYTHING TO END YOUR LIFE?: NO
1. IN THE PAST MONTH, HAVE YOU WISHED YOU WERE DEAD OR WISHED YOU COULD GO TO SLEEP AND NOT WAKE UP?: NO
2. HAVE YOU ACTUALLY HAD ANY THOUGHTS OF KILLING YOURSELF IN THE PAST MONTH?: NO

## 2024-07-10 NOTE — TELEPHONE ENCOUNTER
Pt calling to state that his foot is purple, has concerns for possible fracture and just finished dialysis. Appears pt was in communication with podiatry that agreed with either UC or ED. Pt stated that he will go to ED for further evaluation as his foot is purple.      Melyssa Jean RN on 7/10/2024 at 4:27 PM
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
## 2024-07-10 NOTE — TELEPHONE ENCOUNTER
Other: Patient is calling in for pain medication refill from foot fracture. Patient is in boot. Patient fractured foot in [place] on 7/5 and is having severe pain and is out of medication at this time. No visits with Dr Hill are available until next week. Patient also has some foot discoloration in the foot as well and can send picture via [employer] if needed. Please call patient back as soon as possible. Patient is also requesting home care.        Could we send this information to you in GENELINK or would you prefer to receive a phone call?:   Patient would prefer a phone call   Okay to leave a detailed message?: Yes at Cell number on file:    Telephone Information:   Mobile [phone]

## 2024-07-10 NOTE — TELEPHONE ENCOUNTER
I worte a DME for wheelchair,   It will faxed to [facility] [employer]    In term of pain medications, please refer to my office note  This been discussed with him, ( he been told I will not prescribe opioid or benzo )

## 2024-07-10 NOTE — TELEPHONE ENCOUNTER
Patient called and stated that his phone was not working well yesterday, and kept disconnecting, during his telephone visit yesterday.    He had also make an appointment with podiatry, for this Friday the 16th.      Patient had to leave his chair in [place], but he needs now with a manual wheelchair to be ordered for him.    He is also asking for some pain medication regarding his foot,    He's stated that she will go back to the ER today, if nor response.      Routed to PCP to please advise.    Gaby PETIT RN  Triage Nurse  [facility]

## 2024-07-10 NOTE — TELEPHONE ENCOUNTER
Called and talked to the patient. The patient immediately started about mentioning that he is trying to change the narrative about him not seeking pain medications and that he actually has a foot fracture that he has been seen for. He mentioned that he was seen in Emergency Room and with his primary care physician. The patient continued to talk about how he was in a nursing home where he was the youngest and everyone was double his age. He mentioned that he has many other things that he is dealing with and trying to make appointments with his . The patient also mentioned that he was at dialysis on hold with another clinic.      It was mentioned to the patient that pain medication would not be able to be prescribed due the fact that Dr. Hill has not even seen him for this foot fracture. It was mentioned that Dr. Hill is not in the clinic the rest of the day today or tomorrow but we can get him in to see Dr. Hill on Friday. The patient was given four different times (10:30 am, 11:00 am, 11:30 am, 12:30 pm). The patient then mentioned that he is in dialysis at those times. He then asked if a message can be sent to Dr. Hill to see if he can look at his chart  and give him a call back. It was relayed again that a message can be sent, but there is no guarantee that he will see the message because he is out of clinic until Friday. The patient mentioned that he plans on going to the Emergency Room after dialysis. I agreed saying that going to the Emergency Room or even Urgent Care is a good idea.     The patient asked if he can get a call back tomorrow, but understands that Dr. Hill may not have seen his chart. It was mentioned that he can get a call back tomorrow and we can get him scheduled for Monday at that time, if he can not get in with the other clinic earlier. The patient mentioned that he would need to be scheduled on Tuesday and Thursday. The patient was then told again that it may  be best to go to Emergency Room or Urgent Care to be seen immediately for his fracture.

## 2024-07-11 NOTE — TELEPHONE ENCOUNTER
Faxed wheelchair DME order to [facility] at fax [phone].    SEBASTIAN Hernandez  [facility]

## 2024-07-11 NOTE — TELEPHONE ENCOUNTER
Routing Message to TC.    Patient called back with information on DME company that can get him a wheel chair.    Please fax DME for wheelchair to [facility] at [phone].    Thank you,    Christine M Klisch, RN

## 2024-07-11 NOTE — TELEPHONE ENCOUNTER
Called and spoke with patient.    He will call his insurance company and let us know where to fax the referral for the Wheel chair.    Christine M Klisch, RN

## 2024-07-11 NOTE — DISCHARGE INSTRUCTIONS
Please follow-up with your podiatrist.     Use the walking boot and stay off your foot as much a possible.     The foot xray shows the previous findings and 1st metatarsal fracture you described.

## 2024-07-11 NOTE — TELEPHONE ENCOUNTER
Called and talked to the patient. The patient was asked if he was seen for his foot or if he still needs to see Dr. Hill. The patient mentioned that he was seen at the Emergency room last night and he was given enough medicine to last him until tomorrow and that he will talk to Dr. Hill tomorrow from a video visit. It was mentioned that we did not make him an appointment for tomorrow due to him being in dialysis. He asked for the times again, it was mentioned the times we have are 10:30 am, 11:00 am, 11:30 am, 12:30 pm. He said to schedule him for 10:30 am and if he has to he will be late for his dialysis appointment. It was mentioned that it is up to him to if he wants to schedule at this time. The patient confirmed that he wants to see Dr. Hill tomorrow at 10:30 am. The patient was scheduled for 7/12 at 10:30 AM.

## 2024-07-12 ENCOUNTER — OFFICE VISIT (OUTPATIENT)
Dept: PODIATRY | Facility: CLINIC | Age: 34
End: 2024-07-12
Payer: MEDICARE

## 2024-07-12 DIAGNOSIS — G60.8 PERIPHERAL SENSORY NEUROPATHY: ICD-10-CM

## 2024-07-12 DIAGNOSIS — S99.921S INJURY, FOOT, RIGHT, SEQUELA: ICD-10-CM

## 2024-07-12 DIAGNOSIS — S92.314S: ICD-10-CM

## 2024-07-12 DIAGNOSIS — Q66.70 CAVUS DEFORMITY OF FOOT: Primary | ICD-10-CM

## 2024-07-12 PROCEDURE — 99214 OFFICE O/P EST MOD 30 MIN: CPT | Performed by: PODIATRIST

## 2024-07-12 RX ORDER — OXYCODONE HYDROCHLORIDE 5 MG/1
5 TABLET ORAL EVERY 6 HOURS PRN
Qty: 12 TABLET | Refills: 0 | Status: SHIPPED | OUTPATIENT
Start: 2024-07-12 | End: 2024-07-16
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
## 2024-07-12 NOTE — NURSING NOTE
Dax Villareal's chief complaint for this visit includes:  Chief Complaint   Patient presents with    Consult     Foot fracture occurred on 7/5     PCP: Kole Gomez    Referring Provider:  Referred Self, MD  No address on file    There were no vitals taken for this visit.  Data Unavailable     Do you need any medication refills at today's visit? NO    No Known Allergies    Tegan Bueno LPN
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
## 2024-07-16 ENCOUNTER — VIRTUAL VISIT (OUTPATIENT)
Dept: PODIATRY | Facility: CLINIC | Age: 34
End: 2024-07-16
Attending: FAMILY MEDICINE
Payer: MEDICARE

## 2024-07-16 ENCOUNTER — MYC MEDICAL ADVICE (OUTPATIENT)
Dept: PODIATRY | Facility: CLINIC | Age: 34
End: 2024-07-16

## 2024-07-16 DIAGNOSIS — S99.921S INJURY, FOOT, RIGHT, SEQUELA: ICD-10-CM

## 2024-07-16 DIAGNOSIS — Q66.70 CAVUS DEFORMITY OF FOOT: ICD-10-CM

## 2024-07-16 DIAGNOSIS — S92.314A CLOSED NONDISPLACED FRACTURE OF FIRST METATARSAL BONE OF RIGHT FOOT, INITIAL ENCOUNTER: ICD-10-CM

## 2024-07-16 DIAGNOSIS — G60.8 PERIPHERAL SENSORY NEUROPATHY: ICD-10-CM

## 2024-07-16 PROCEDURE — 99207 PR NO BILLABLE SERVICE THIS VISIT: CPT | Mod: 93 | Performed by: PODIATRIST

## 2024-07-16 RX ORDER — OXYCODONE HYDROCHLORIDE 5 MG/1
5 TABLET ORAL EVERY 6 HOURS PRN
Qty: 12 TABLET | Refills: 0 | Status: SHIPPED | OUTPATIENT
Start: 2024-07-16 | End: 2024-07-23

## 2024-07-16 NOTE — LETTER
7/16/2024      Dax Villareal  [address]  [place] [address]      Dear Colleague,    Thank you for referring your patient, Dax Villareal, to the [facility]. Please see a copy of my visit note below.    Past Medical History:   Diagnosis Date     ADHD 2008     Alcohol abuse, in remission      Alcoholic cirrhosis of liver with ascites (H)      Anemia in chronic kidney disease      Chronic pain      Current smoker      End stage renal disease (H)      Hypotension, unspecified hypotension type      Malnutrition (H24)      Metabolic acidosis      Necrotizing pancreatitis      Pancreatic insufficiency      Pericarditis      Recurrent pleural effusion      Patient Active Problem List   Diagnosis     Necrotizing pancreatitis     ESRD (end stage renal disease) on dialysis (H)     Cirrhosis of liver (H)     Muscle weakness     Contracture of joint of foot, unspecified laterality     DIMITRI (generalized anxiety disorder)     Alcohol abuse, in remission     Chronic pain     Pancreatic insufficiency     Anemia in chronic kidney disease     Acquired bilateral pes cavus     Chronic pain of both feet     ADHD     Past Surgical History:   Procedure Laterality Date     AV FISTULA PLACEMENT, BRACHIOCEPHALIC Left 05/11/2022    [facility]     COMBINED ESOPHAGOSCOPY, GASTROSCOPY, DUODENOSCOPY (EGD), TISSUE APPOSI N/A 09/20/2021    Procedure: ESOPHAGOGASTRODUODENOSCOPY WITH SUTURE FISTULA CLOSURE, argon plasma coagulation;  Surgeon: Jose Quigley MD;  [facility]     ENDOSCOPIC INSERTION TUBE GASTROSTOMY N/A 07/13/2021    Procedure: Upper endoscopy, INSERTION, PEG-J TUBE and Gastropexy;  Surgeon: Rayshawn Will MD;  [facility]     ENDOSCOPIC INSERTION TUBE GASTROSTOMY  09/13/2021    Procedure: PEG/J PLACEMENT.;  Surgeon: Rayshawn Will MD;  [facility]     ENDOSCOPIC ULTRASOUND UPPER GASTROINTESTINAL TRACT (GI) N/A 07/21/2021    Procedure: ENDOSCOPIC ULTRASOUND,  ESOPHAGOSCOPY / UPPER GASTROINTESTINAL TRACT (GI) with cyst gastrostomy, dilation;  Surgeon: Guru Yecenia Chacko MD;  [facility]     ENDOSCOPIC ULTRASOUND, ESOPHAGOSCOPY, GASTROSCOPY, DUODENOSCOPY (EGD), NECROSECTOMY N/A 08/11/2021    Procedure: ESOPHAGOGASTRODUODENOSCOPY (EGD) with necrosectomy, stent exchange.;  Surgeon: Amadou Beasley MD;  [facility]     ESOPHAGOSCOPY, GASTROSCOPY, DUODENOSCOPY (EGD), COMBINED N/A 07/28/2021    Procedure: ESOPHAGOGASTRODUODENOSCOPY (EGD), gastrostomy apposition, Necrosectomy, stent exchange.;  Surgeon: Rayshawn Will MD;  [facility]     ESOPHAGOSCOPY, GASTROSCOPY, DUODENOSCOPY (EGD), COMBINED N/A 08/06/2021    Procedure: ESOPHAGOGASTRODUODENOSCOPY (EGD) with necrosectomy, and stents exchanged;  Surgeon: Jose Quigley MD;  [facility]     ESOPHAGOSCOPY, GASTROSCOPY, DUODENOSCOPY (EGD), COMBINED Left 09/13/2021    Procedure: ESOPHAGOGASTRODUODENOSCOPY (EGD), GASTROSTOMY TUBE REMOVAL, FISTULAR CLOSURE ENDOSCOPIC.;  Surgeon: Rayshawn Will MD;  [facility]     IR ABSCESS TUBE CHANGE  07/14/2021     IR NG TUBE PLACEMENT REQ RAD & FLUORO  10/26/2021     IR PARACENTESIS  07/21/2021     IR SINOGRAM INJECTION DIAGNOSTIC  08/25/2021     IR THORACENTESIS  02/05/2022     ORTHOPEDIC SURGERY Right     fracture repair     REPLACE GASTROSTOMY TUBE, PERCUTANEOUS N/A 07/28/2021    Procedure: gastro-jejunostomy tube exchange;  Surgeon: Rayshawn Will MD;  [facility]     Social History     Socioeconomic History     Marital status: Single     Spouse name: Not on file     Number of children: Not on file     Years of education: Not on file     Highest education level: Not on file   Occupational History     Not on file   Tobacco Use     Smoking status: Some Days     Current packs/day: 0.25     Types: Cigarettes     Passive exposure: Current     Smokeless tobacco: Never     Tobacco comments:     8/24/2021 Patient did not want to  "discuss quitting but would like 4 mg lozenge to use during admission. Patient also accepted \"Quitting for Good\" workbook   Vaping Use     Vaping status: Never Used     Passive vaping exposure: Yes   Substance and Sexual Activity     Alcohol use: Not Currently     Comment: \"no alcohol for over 1 year ago\" per pt on 5/12/22     Drug use: No     Sexual activity: Not Currently     Partners: Female   Other Topics Concern     Not on file   Social History Narrative     Not on file     Social Determinants of Health     Financial Resource Strain: Low Risk  (11/21/2023)    Financial Resource Strain      Within the past 12 months, have you or your family members you live with been unable to get utilities (heat, electricity) when it was really needed?: No   Recent Concern: Financial Resource Strain - Medium Risk (9/1/2023)    Overall Financial Resource Strain (CARDIA)      Difficulty of Paying Living Expenses: Somewhat hard   Food Insecurity: High Risk (11/21/2023)    Food Insecurity      Within the past 12 months, did you worry that your food would run out before you got money to buy more?: Yes      Within the past 12 months, did the food you bought just not last and you didn t have money to get more?: No   Transportation Needs: High Risk (11/21/2023)    Transportation Needs      Within the past 12 months, has lack of transportation kept you from medical appointments, getting your medicines, non-medical meetings or appointments, work, or from getting things that you need?: Yes   Physical Activity: Patient Declined (9/1/2023)    Exercise Vital Sign      Days of Exercise per Week: Patient declined      Minutes of Exercise per Session: Patient declined   Recent Concern: Physical Activity - Inactive (6/5/2023)    Exercise Vital Sign      Days of Exercise per Week: 0 days      Minutes of Exercise per Session: 0 min   Stress: Stress Concern Present (9/1/2023)    [employer] - Occupational Stress " Questionnaire      Feeling of Stress : Rather much   Social Connections: Unknown (9/1/2023)    Social Connection and Isolation Panel [NHANES]      Frequency of Communication with Friends and Family: Twice a week      Frequency of Social Gatherings with Friends and Family: Patient declined      Attends Baptism Services: Patient declined      Active Member of Clubs or Organizations: No      Attends Club or Organization Meetings: Never      Marital Status: Patient declined   Interpersonal Safety: Low Risk  (1/30/2024)    Interpersonal Safety      Do you feel physically and emotionally safe where you currently live?: Yes      Within the past 12 months, have you been hit, slapped, kicked or otherwise physically hurt by someone?: No      Within the past 12 months, have you been humiliated or emotionally abused in other ways by your partner or ex-partner?: No   Housing Stability: High Risk (11/21/2023)    Housing Stability      Do you have housing? : Yes      Are you worried about losing your housing?: Yes     Family History   Problem Relation Age of Onset     Attention Deficit Disorder Mother      Alcoholism Mother      Cancer Mother      Alcoholism Father      Alcoholism Sister      Alcoholism Brother        Subjective findings- Patient seen on phone visit for right foot injury with first metatarsal nondisplaced fracture.  He relates he is wearing the cam boot, relates he is having pain that is making it difficult for him to get around, relates his primary physician sent in a prescription for a wheelchair from [employer], relates he is using the Oxycodone as needed and he could use a refill this, had questions regarding physical therapy, relates he has been using the Loprox cream.    Objective findings- unremarkable.    Assessment and plan- Right foot injury with proximal first metatarsal nondisplaced fracture, foot pain bilaterally, cavus foot bilaterally.  Diagnosis and treatment options discussed with the  patient.  Refill for Oxycodone given use discussed with them, advised to follow-up with pain clinic or his primary care physician for longer-term narcotic management, he relates he does have an appointment with a chronic pain specialist coming up.  Will hold on any physical therapy for the right foot at this time but the plan would be to do physical therapy in the future.  Wheelchair as per primary physician.  Previous notes reviewed.  Return to clinic and see me in 4 to 5 weeks.                          Moderate level of medical decision making.      Again, thank you for allowing me to participate in the care of your patient.        Sincerely,        Dev Hill DPM

## 2024-07-16 NOTE — PROGRESS NOTES
Past Medical History:   Diagnosis Date    ADHD 2008    Alcohol abuse, in remission     Alcoholic cirrhosis of liver with ascites (H)     Anemia in chronic kidney disease     Chronic pain     Current smoker     End stage renal disease (H)     Hypotension, unspecified hypotension type     Malnutrition (H24)     Metabolic acidosis     Necrotizing pancreatitis     Pancreatic insufficiency     Pericarditis     Recurrent pleural effusion      Patient Active Problem List   Diagnosis    Necrotizing pancreatitis    ESRD (end stage renal disease) on dialysis (H)    Cirrhosis of liver (H)    Muscle weakness    Contracture of joint of foot, unspecified laterality    DIMITRI (generalized anxiety disorder)    Alcohol abuse, in remission    Chronic pain    Pancreatic insufficiency    Anemia in chronic kidney disease    Acquired bilateral pes cavus    Chronic pain of both feet    ADHD     Past Surgical History:   Procedure Laterality Date    AV FISTULA PLACEMENT, BRACHIOCEPHALIC Left 05/11/2022    [facility]    COMBINED ESOPHAGOSCOPY, GASTROSCOPY, DUODENOSCOPY (EGD), TISSUE APPOSI N/A 09/20/2021    Procedure: ESOPHAGOGASTRODUODENOSCOPY WITH SUTURE FISTULA CLOSURE, argon plasma coagulation;  Surgeon: Jose Quigley MD;  [facility]    ENDOSCOPIC INSERTION TUBE GASTROSTOMY N/A 07/13/2021    Procedure: Upper endoscopy, INSERTION, PEG-J TUBE and Gastropexy;  Surgeon: Rayshawn Will MD;  [facility]    ENDOSCOPIC INSERTION TUBE GASTROSTOMY  09/13/2021    Procedure: PEG/J PLACEMENT.;  Surgeon: Rayshawn Will MD;  [facility]    ENDOSCOPIC ULTRASOUND UPPER GASTROINTESTINAL TRACT (GI) N/A 07/21/2021    Procedure: ENDOSCOPIC ULTRASOUND, ESOPHAGOSCOPY / UPPER GASTROINTESTINAL TRACT (GI) with cyst gastrostomy, dilation;  Surgeon: Guru Yecenia Chacko MD;  [facility]    ENDOSCOPIC ULTRASOUND, ESOPHAGOSCOPY, GASTROSCOPY, DUODENOSCOPY (EGD), NECROSECTOMY N/A 08/11/2021    Procedure:  "ESOPHAGOGASTRODUODENOSCOPY (EGD) with necrosectomy, stent exchange.;  Surgeon: Amadou Beasley MD;  [facility]    ESOPHAGOSCOPY, GASTROSCOPY, DUODENOSCOPY (EGD), COMBINED N/A 07/28/2021    Procedure: ESOPHAGOGASTRODUODENOSCOPY (EGD), gastrostomy apposition, Necrosectomy, stent exchange.;  Surgeon: Rayshawn Will MD;  [facility]    ESOPHAGOSCOPY, GASTROSCOPY, DUODENOSCOPY (EGD), COMBINED N/A 08/06/2021    Procedure: ESOPHAGOGASTRODUODENOSCOPY (EGD) with necrosectomy, and stents exchanged;  Surgeon: Jose Quigley MD;  [facility]    ESOPHAGOSCOPY, GASTROSCOPY, DUODENOSCOPY (EGD), COMBINED Left 09/13/2021    Procedure: ESOPHAGOGASTRODUODENOSCOPY (EGD), GASTROSTOMY TUBE REMOVAL, FISTULAR CLOSURE ENDOSCOPIC.;  Surgeon: Rayshawn Will MD;  [facility]    IR ABSCESS TUBE CHANGE  07/14/2021    IR NG TUBE PLACEMENT REQ RAD & FLUORO  10/26/2021    IR PARACENTESIS  07/21/2021    IR SINOGRAM INJECTION DIAGNOSTIC  08/25/2021    IR THORACENTESIS  02/05/2022    ORTHOPEDIC SURGERY Right     fracture repair    REPLACE GASTROSTOMY TUBE, PERCUTANEOUS N/A 07/28/2021    Procedure: gastro-jejunostomy tube exchange;  Surgeon: Rayshawn Will MD;  [facility]     Social History     Socioeconomic History    Marital status: Single     Spouse name: Not on file    Number of children: Not on file    Years of education: Not on file    Highest education level: Not on file   Occupational History    Not on file   Tobacco Use    Smoking status: Some Days     Current packs/day: 0.25     Types: Cigarettes     Passive exposure: Current    Smokeless tobacco: Never    Tobacco comments:     8/24/2021 Patient did not want to discuss quitting but would like 4 mg lozenge to use during admission. Patient also accepted \"Quitting for Good\" workbook   Vaping Use    Vaping status: Never Used    Passive vaping exposure: Yes   Substance and Sexual Activity    Alcohol use: Not Currently     Comment: \"no alcohol for " "over 1 year ago\" per pt on 5/12/22    Drug use: No    Sexual activity: Not Currently     Partners: Female   Other Topics Concern    Not on file   Social History Narrative    Not on file     Social Determinants of Health     Financial Resource Strain: Low Risk  (11/21/2023)    Financial Resource Strain     Within the past 12 months, have you or your family members you live with been unable to get utilities (heat, electricity) when it was really needed?: No   Recent Concern: Financial Resource Strain - Medium Risk (9/1/2023)    Overall Financial Resource Strain (CARDIA)     Difficulty of Paying Living Expenses: Somewhat hard   Food Insecurity: High Risk (11/21/2023)    Food Insecurity     Within the past 12 months, did you worry that your food would run out before you got money to buy more?: Yes     Within the past 12 months, did the food you bought just not last and you didn t have money to get more?: No   Transportation Needs: High Risk (11/21/2023)    Transportation Needs     Within the past 12 months, has lack of transportation kept you from medical appointments, getting your medicines, non-medical meetings or appointments, work, or from getting things that you need?: Yes   Physical Activity: Patient Declined (9/1/2023)    Exercise Vital Sign     Days of Exercise per Week: Patient declined     Minutes of Exercise per Session: Patient declined   Recent Concern: Physical Activity - Inactive (6/5/2023)    Exercise Vital Sign     Days of Exercise per Week: 0 days     Minutes of Exercise per Session: 0 min   Stress: Stress Concern Present (9/1/2023)    [employer] - Occupational Stress Questionnaire     Feeling of Stress : Rather much   Social Connections: Unknown (9/1/2023)    Social Connection and Isolation Panel [NHANES]     Frequency of Communication with Friends and Family: Twice a week     Frequency of Social Gatherings with Friends and Family: Patient declined     Attends Gnosticist " Services: Patient declined     Active Member of Clubs or Organizations: No     Attends Club or Organization Meetings: Never     Marital Status: Patient declined   Interpersonal Safety: Low Risk  (1/30/2024)    Interpersonal Safety     Do you feel physically and emotionally safe where you currently live?: Yes     Within the past 12 months, have you been hit, slapped, kicked or otherwise physically hurt by someone?: No     Within the past 12 months, have you been humiliated or emotionally abused in other ways by your partner or ex-partner?: No   Housing Stability: High Risk (11/21/2023)    Housing Stability     Do you have housing? : Yes     Are you worried about losing your housing?: Yes     Family History   Problem Relation Age of Onset    Attention Deficit Disorder Mother     Alcoholism Mother     Cancer Mother     Alcoholism Father     Alcoholism Sister     Alcoholism Brother        Subjective findings- Patient seen on phone visit for right foot injury with first metatarsal nondisplaced fracture.  He relates he is wearing the cam boot, relates he is having pain that is making it difficult for him to get around, relates his primary physician sent in a prescription for a wheelchair from [employer], relates he is using the Oxycodone as needed and he could use a refill this, had questions regarding physical therapy, relates he has been using the Loprox cream.    Objective findings- unremarkable.    Assessment and plan- Right foot injury with proximal first metatarsal nondisplaced fracture, foot pain bilaterally, cavus foot bilaterally.  Diagnosis and treatment options discussed with the patient.  Refill for Oxycodone given use discussed with them, advised to follow-up with pain clinic or his primary care physician for longer-term narcotic management, he relates he does have an appointment with a chronic pain specialist coming up.  Will hold on any physical therapy for the right foot at this time but the plan would  be to do physical therapy in the future.  Wheelchair as per primary physician.  Previous notes reviewed.  Return to clinic and see me in 4 to 5 weeks.                          Moderate level of medical decision making.

## 2024-07-17 ENCOUNTER — TELEPHONE (OUTPATIENT)
Dept: FAMILY MEDICINE | Facility: CLINIC | Age: 34
End: 2024-07-17
Payer: MEDICARE

## 2024-07-17 NOTE — TELEPHONE ENCOUNTER
Pt calling to state that he is wanting to know if DME can be sent to [facility]. RN confirmed that [facility] is the location of [address] [place] [address] in [place].     Pt to call back for request of Peer to peer review from podiatrist for pain medications     RN called [facility] at [phone] to confirm fax number for DME the needs to be sent per pt's request. FAX: [phone]    Routing to team to fax Wheelchair Order for DME to [facility].     Melyssa Jean RN on 7/17/2024 at 3:03 PM

## 2024-07-17 NOTE — TELEPHONE ENCOUNTER
Order/Referral Request    Who is requesting: Pt following up on order for wheelchair    Orders being requested: Wheelchair and pt wants to know will it be manual or power    Reason service is needed/diagnosis: Pt uses wheelchair    When are orders needed by: ASAP    Has this been discussed with Provider: Yes    Does patient have a preference on a Group/Provider/Facility? N/A    Does patient have an appointment scheduled?: No    Where to send orders:  [facility]    Could we send this information to you in [facility] or would you prefer to receive a phone call?:   Patient would prefer a phone call   Okay to leave a detailed message?: Yes at Cell number on file:    Telephone Information:   Mobile [phone]

## 2024-07-18 NOTE — TELEPHONE ENCOUNTER
Faxed Wheelchair DME order to [facility] at fax [phone].    SEBASTIAN Hernandez  [facility]

## 2024-07-19 ENCOUNTER — VIRTUAL VISIT (OUTPATIENT)
Dept: FAMILY MEDICINE | Facility: CLINIC | Age: 34
End: 2024-07-19
Payer: MEDICARE

## 2024-07-19 ENCOUNTER — TELEPHONE (OUTPATIENT)
Dept: FAMILY MEDICINE | Facility: CLINIC | Age: 34
End: 2024-07-19

## 2024-07-19 DIAGNOSIS — M79.671 CHRONIC PAIN OF BOTH FEET: Primary | ICD-10-CM

## 2024-07-19 DIAGNOSIS — G89.29 CHRONIC PAIN OF BOTH FEET: Primary | ICD-10-CM

## 2024-07-19 DIAGNOSIS — M79.672 CHRONIC PAIN OF BOTH FEET: Primary | ICD-10-CM

## 2024-07-19 PROCEDURE — 99213 OFFICE O/P EST LOW 20 MIN: CPT | Mod: 95 | Performed by: NURSE PRACTITIONER

## 2024-07-19 ASSESSMENT — PATIENT HEALTH QUESTIONNAIRE - PHQ9
SUM OF ALL RESPONSES TO PHQ QUESTIONS 1-9: 8
SUM OF ALL RESPONSES TO PHQ QUESTIONS 1-9: 8
10. IF YOU CHECKED OFF ANY PROBLEMS, HOW DIFFICULT HAVE THESE PROBLEMS MADE IT FOR YOU TO DO YOUR WORK, TAKE CARE OF THINGS AT HOME, OR GET ALONG WITH OTHER PEOPLE: EXTREMELY DIFFICULT

## 2024-07-19 NOTE — TELEPHONE ENCOUNTER
"Patient called with concerns about multiple subject. Patient mentioned needing a support animal, CADI waver for more help, insurance switched, issues with pain medication, issues with past appointments being changed from in person to virtual, how he has had a hard time finding psychiatrist that understands him.     Patient stated he feels he was \"cornered\", and \"made a liar\" regarding his lorazepam and medical marijuana by psychiatry. Per patient he was treated like he was seeking when he was not as he was only trying to explained to the psychiatrist that he had medical marijuana but \"she wouldn't even let me finish my sentence or listen to me\". When he called to schedule with a new psychiatrist they told him they cannot get him in until late October but they also tried to schedule patient with the same psychiatrist which he told the  repeatedly he did not want to see her anymore.     Patient is asking for intermediate anxiety care and \"I never asked for lorazepam or anything just for something to help with my symptom my anxiety\". Patient was also talking about his adderall and that he is weaning off of this.    Patient asking for referral for medical marijuana. However, patient concerned insurance might not cover and he is concerned that his care team is not \"doing any peer to peer reviews\" as he is requesting because he is being treated like he is a seeker and he wants specialty to talk to primary care. Patient feels he is asking for help and no one is listening to him.     Patient is also upset that his wheelchair order was sent back to clinic from [facility] for more information and now he is still waiting for his wheelchair.     Patient concerned he will end up in the ER for pain again because no one is treating his pain. \"I have had seven years of surgeries, three years of shitty off and on services from my medical team and the [facility], and no good foundation to heal on\". \"This makes me want to " "switch to [facility] because no providers in [place] do peer to peers and no one helps\". \"Now I am going into the weekend and not knowing if my PCP cares and is thinking oh he can just tough it up but I am trying and I was given a 25% chance to live and this shows where my health is at\". \"How am I drug seeking when I was told by the doctor [place] to ask my PCP about the medication so why am I being scolded\". \"I really think I should reschedule my dialysis and see a different primary doctor\".     I attempted to give patient relation phone number and he did not want this, he asked to send a message to PCP and his podiatrist and ask \"who wants to actually stay on my team and treat me and willing to do peer to peers\". \" Can I even get an answer of any kind before the weekend\". \" I guess I will just call and ambulance and go to the ER for like the 6th time this month\".     Thanks,  ROB Mcmanus  [facility]     "

## 2024-07-19 NOTE — PROGRESS NOTES
Dax is a 33 year old who is being evaluated via a billable video visit.    How would you like to obtain your AVS? MyChart  If the video visit is dropped, the invitation should be resent by:   Will anyone else be joining your video visit? No      Assessment & Plan       ICD-10-CM    1. Chronic pain of both feet  M79.671     G89.29     M79.672         Paperwork filled out for recertification for medical cannabis.    Reviewed podiatry note x 2, psychiatry note x 2      Subjective   Dax is a 33 year old, presenting for the following health issues:  Consult (Medical Marijuana )      7/19/2024    12:20 PM   Additional Questions   Roomed by Kathy Whittington CMA     Video Start Time: 1300    History of Present Illness       Reason for visit:  MM    He eats 2-3 servings of fruits and vegetables daily.He consumes 3 sweetened beverage(s) daily.He exercises with enough effort to increase his heart rate 9 or less minutes per day.  He exercises with enough effort to increase his heart rate 3 or less days per week.   He is taking medications regularly.       Patient presents today for medical cannabis recertification.  Previously was going again on it but then did not.  He had concerns that it would impact his ability to own firearms.  Plans to use for neuropathy symptoms      Review of Systems  Constitutional, HEENT, cardiovascular, pulmonary, gi and gu systems are negative, except as otherwise noted.      Objective           Vitals:  No vitals were obtained today due to virtual visit.    Physical Exam   GENERAL: alert and no distress  EYES: Eyes grossly normal to inspection.  No discharge or erythema, or obvious scleral/conjunctival abnormalities.  RESP: No audible wheeze, cough, or visible cyanosis.    SKIN: Visible skin clear. No significant rash, abnormal pigmentation or lesions.  NEURO: Cranial nerves grossly intact.  Mentation and speech appropriate for age.  PSYCH: Appropriate affect, tone, and pace of words         Video-Visit Details    Type of service:  Video Visit   Video End Time:1310  Originating Location (pt. Location): Dialysis    Distant Location (provider location):  On-site  Platform used for Video Visit: La  Signed Electronically by: Akil Cox NP

## 2024-07-22 ENCOUNTER — TELEPHONE (OUTPATIENT)
Dept: PODIATRY | Facility: CLINIC | Age: 34
End: 2024-07-22
Payer: MEDICARE

## 2024-07-22 NOTE — TELEPHONE ENCOUNTER
Dr. Gomez,    Do you have any advice before I contact patient? It looks like he is scheduled to see you tomorrow. Did you want to discuss with him during appt?    Thanks,  ROB Mcmanus  [facility]

## 2024-07-22 NOTE — TELEPHONE ENCOUNTER
"Called and talked to the patient. Relayed what Dr. Hill mentioned, \"I sent his primary care physician a note regarding pain management.  If he is in too much pain he should go to the emergency room for evaluation and management.\" The patient then asked which Emergency Room, [facility]? It was mentioned that any Emergency Room would be able to see him for his pain.     The patient then mentioned about how he was called by the nurse from his primary care provider's office and was told that that he would be prescribed any pain medication. He continued to talk about how he wanted to try medical marijuana, but he is a св[place], so he decided to try something different. The patient also mentioned that he saw someone for pain management int he past, but when he tried to get an appointment again, he was unable to.     It was mentioned again that Dr. Hill sent a note over to his primary care provider and that he should talk to that provider at his appointment tomorrow. The patient verbally understood.   "

## 2024-07-22 NOTE — TELEPHONE ENCOUNTER
Dr. Gomez,     I was asking for any additional advice as I am not very familiar with this patient and I am unsure of how to go about trying to help the patient. I apologize if my notes were not clear enough as this patient has many different situations occur at once it was difficult to track everything during the phone call.     I spoke with pt and reiterated your message as well as Dr. Bangura'.    Patient asking if you can do peer to peer with podiatry Dr. Hill regarding his foot.     Stated he still plans to complete visit with you tomorrow.     Asked if team can help coordinate appt with pain clinic. I reached out to pain clinic and asked if there is anyway they could see patient tomorrow after appt with PCP as he requested. Per pain clinic they cannot get him in tomorrow as interventional pain mngt books out far and they do not have a lot of providers who do this. The soonest would be Monday September 16th at 945 am. They can also put him on waitlist. Will notify patent when we call him back.     Thanks,  ROB Mcmanus  [facility]

## 2024-07-22 NOTE — TELEPHONE ENCOUNTER
Sent Patient [employer] message response to follow-up with PCP at tomorrows appointment.    Christine M Klisch, RN

## 2024-07-22 NOTE — TELEPHONE ENCOUNTER
Team,    Patient called asking if we can help and reach out to [employer] to help him determine when he will get his wheelchair as he cannot walk right now. I am working on connecting with pain clinic for this patient and am unable to connect with [facility] on top of this. See other TE if more context needed.     Thanks,  ROB Mcmanus  [facility]

## 2024-07-22 NOTE — TELEPHONE ENCOUNTER
There been communications with his Podiatry thru mychart notes and messages.    No further recommendations at at this time.

## 2024-07-22 NOTE — TELEPHONE ENCOUNTER
Called patient and not sure exactly what he needed.  Patient just rambled on about Dr Gomez not giving him pain meds and also about the podiatrist telling him that he needs to get all meds from his PCP.    SEBASTIAN Hernandez  [facility]

## 2024-07-22 NOTE — TELEPHONE ENCOUNTER
Other: pt of Dr. Hill is asking for the care team.  Pt needs that his primary care doctor tomorrow is not being properly advised about Dr. Bangura recommendations.  Pt has severe pain in his R foot is worsening.  Pt is concerned about primary care taking over on pts meds for the next 4 weeks and would like Dr. Hill to make specific recommendations.  Pt notes that he is thinking about doing a corrective surgery which was recommended before.  It involves breaking his feet.  This would be an alternative to his current care plan.  He points out that he is not on hydrocodone but he is instead on oxycodone 5 mg.              He does not have a wheelchair yet. He is working with [employer]. He asked if a scooter would be an option and make it easier for him.  He walks incredibly slowly and is not getting any help physically.    They have not called him yet.             His primary care doctor said he would not be prescribing any pain medication.  He doesn't think they realize the extent of this affecting his dialysis and ability to live.    He wants his concerns to be taken seriously.  He is asking if he should call Dr. Bangura office tomorrow or go to the ER.  Pt said he cannot get set up at the pain management company he was recommended to.  This is for chronic pain and digestive system.  He wants to reestablish this care.       Could we send this information to you in [facility] or would you prefer to receive a phone call?:   Patient would prefer a phone call   Okay to leave a detailed message?: Yes at Cell number on file:    Telephone Information:   Mobile [phone]

## 2024-07-22 NOTE — TELEPHONE ENCOUNTER
"Received call back. Patient is calling back as he had spoke with [employer], who will be sending over required forms and outlining what information is needed. Patient once again emphasizing that he is requesting peer to peer phone call to be completed with Podiatrist as specified in previous note.     Patient once again relayed same message from initial note within encounter. Patient stating \"I feel that there has been a miscommunication between all these doctors, and no one seems to care\".     Patient explains that he would be interested in getting a powered scooter rather than a wheelchair as an option. Patient explains he had spoke to his [employer] worker who is also willing to further assist with situation.    [phone] (Loi Woodson, [facility]) - also has number for [employer] Worker phone number.     Patient requesting review and any further advice from PCP. Routing to team to please watch for incoming forms from [employer], and PCP for advice on situation.     DAMASO Candelaria RN  [facility]  "

## 2024-07-22 NOTE — TELEPHONE ENCOUNTER
What advise you are looking for ?    I have responded to these massages many times in the past.     Any concerns regarding his Psychiatry issue or medications :  NEED TO BE FORWARD THESE MASSAGES TO HIS PSYCHIATRY   This been communicated on multiple level.  He see Psychiatry , need to contact his office.s    Same things goes with his pain medicine and managements.  I have referred him multiple time to pain management.    Pain management do not recommend control medicine  I will not refill his oxycodone.  He is been on other medicines

## 2024-07-23 ENCOUNTER — TELEPHONE (OUTPATIENT)
Dept: PODIATRY | Facility: CLINIC | Age: 34
End: 2024-07-23

## 2024-07-23 ENCOUNTER — OFFICE VISIT (OUTPATIENT)
Dept: FAMILY MEDICINE | Facility: CLINIC | Age: 34
End: 2024-07-23
Payer: MEDICARE

## 2024-07-23 VITALS
SYSTOLIC BLOOD PRESSURE: 109 MMHG | DIASTOLIC BLOOD PRESSURE: 75 MMHG | WEIGHT: 202 LBS | BODY MASS INDEX: 31.64 KG/M2 | TEMPERATURE: 98.2 F | OXYGEN SATURATION: 97 % | HEART RATE: 100 BPM | RESPIRATION RATE: 25 BRPM

## 2024-07-23 DIAGNOSIS — S99.921S INJURY, FOOT, RIGHT, SEQUELA: ICD-10-CM

## 2024-07-23 DIAGNOSIS — Q66.70 CAVUS DEFORMITY OF FOOT: ICD-10-CM

## 2024-07-23 DIAGNOSIS — M25.562 CHRONIC PAIN OF LEFT KNEE: ICD-10-CM

## 2024-07-23 DIAGNOSIS — R26.89 IMPAIRED GAIT AND MOBILITY: ICD-10-CM

## 2024-07-23 DIAGNOSIS — F90.9 ATTENTION DEFICIT HYPERACTIVITY DISORDER (ADHD), UNSPECIFIED ADHD TYPE: ICD-10-CM

## 2024-07-23 DIAGNOSIS — M21.6X1 ACQUIRED BILATERAL PES CAVUS: ICD-10-CM

## 2024-07-23 DIAGNOSIS — S92.314D CLOSED NONDISPLACED FRACTURE OF FIRST METATARSAL BONE OF RIGHT FOOT WITH ROUTINE HEALING, SUBSEQUENT ENCOUNTER: Primary | ICD-10-CM

## 2024-07-23 DIAGNOSIS — M24.576 CONTRACTURE OF JOINT OF FOOT, UNSPECIFIED LATERALITY: ICD-10-CM

## 2024-07-23 DIAGNOSIS — G60.8 PERIPHERAL SENSORY NEUROPATHY: ICD-10-CM

## 2024-07-23 DIAGNOSIS — G89.29 CHRONIC PAIN OF LEFT KNEE: ICD-10-CM

## 2024-07-23 DIAGNOSIS — F41.1 GAD (GENERALIZED ANXIETY DISORDER): ICD-10-CM

## 2024-07-23 DIAGNOSIS — M21.6X2 ACQUIRED BILATERAL PES CAVUS: ICD-10-CM

## 2024-07-23 PROCEDURE — 99214 OFFICE O/P EST MOD 30 MIN: CPT | Performed by: FAMILY MEDICINE

## 2024-07-23 RX ORDER — DEXTROAMPHETAMINE SACCHARATE, AMPHETAMINE ASPARTATE, DEXTROAMPHETAMINE SULFATE AND AMPHETAMINE SULFATE 7.5; 7.5; 7.5; 7.5 MG/1; MG/1; MG/1; MG/1
TABLET ORAL
Qty: 90 TABLET | Refills: 0 | Status: SHIPPED | OUTPATIENT
Start: 2024-07-26 | End: 2024-07-25 | Stop reason: ALTCHOICE

## 2024-07-23 RX ORDER — LIDOCAINE 50 MG/G
OINTMENT TOPICAL PRN
Qty: 240 G | Refills: 0 | Status: SHIPPED | OUTPATIENT
Start: 2024-07-23 | End: 2024-07-23

## 2024-07-23 RX ORDER — LIDOCAINE 50 MG/G
OINTMENT TOPICAL PRN
Qty: 240 G | Refills: 0 | Status: SHIPPED | OUTPATIENT
Start: 2024-07-23

## 2024-07-23 RX ORDER — DEXTROAMPHETAMINE SACCHARATE, AMPHETAMINE ASPARTATE MONOHYDRATE, DEXTROAMPHETAMINE SULFATE AND AMPHETAMINE SULFATE 7.5; 7.5; 7.5; 7.5 MG/1; MG/1; MG/1; MG/1
CAPSULE, EXTENDED RELEASE ORAL
Qty: 90 CAPSULE | Refills: 0 | Status: SHIPPED | OUTPATIENT
Start: 2024-09-26 | End: 2024-07-25 | Stop reason: ALTCHOICE

## 2024-07-23 RX ORDER — DEXTROAMPHETAMINE SACCHARATE, AMPHETAMINE ASPARTATE MONOHYDRATE, DEXTROAMPHETAMINE SULFATE AND AMPHETAMINE SULFATE 7.5; 7.5; 7.5; 7.5 MG/1; MG/1; MG/1; MG/1
CAPSULE, EXTENDED RELEASE ORAL
Qty: 90 CAPSULE | Refills: 0 | Status: SHIPPED | OUTPATIENT
Start: 2024-08-26 | End: 2024-07-25 | Stop reason: ALTCHOICE

## 2024-07-23 NOTE — TELEPHONE ENCOUNTER
Patient Returning Call    Reason for call:  pt calling 7/23/24 saw PCP and was ref for re-eval for wheelchair\ scooter and another is ortho therapy\ [facility]ngmt [facility]- and will be discussing surgical options or if he will let it heel. Pt is worried he will have to wait to get into facility and if wanting to know if provider is able to provider pain med in the mean time while he waits for apt and gets his wheelchair and will have to be on his foot. Requesting callback for further plan of care provider may have for him.     Information relayed to patient:  te sent to clinic     Patient has additional questions:  No      Could we send this information to you in [employer] or would you prefer to receive a phone call?:   Patient would prefer a phone call   Okay to leave a detailed message?: Yes at Cell number on file:    Telephone Information:   Mobile [phone]  My [employer] msg is ok too   Pharmacy is [facility] in [place]

## 2024-07-23 NOTE — LETTER
July 23, 2024      Dax Villareal  [address]   [place] [address]      To Whom it May Concern:    I am writing to formally request a housing exemption for an emotional support animal for my patient, due to an underlying medical condition.     I have treated this patient since 07/7/2022.  I have determined that this patient would benefit and meets the medical criteria to have an emotional support animal.      I believe that an emotional support animal would improve the patient's quality of life if an housing exemption can be accommodated.  Please contact me if you have any questions.    Sincerely,       Sincerely,        Kole Gomez MD

## 2024-07-23 NOTE — PROGRESS NOTES
Assessment & Plan     Closed nondisplaced fracture of first metatarsal bone of right foot with routine healing, subsequent encounter  Continue to wear Camp walker,  Managed by Podiatry  Continue with current pain medications.  Continue with Baclofen, Duloxetine, Pregabalin    Follow up with Podiatry.    - lidocaine (XYLOCAINE) 5 % external ointment; Apply topically as needed for moderate pain (qid as needed) Apply to feet and knees qid for 10 days, then as needed    Acquired bilateral pes cavus    - lidocaine (XYLOCAINE) 5 % external ointment; Apply topically as needed for moderate pain (qid as needed) Apply to feet and knees qid for 10 days, then as needed  - Occupational Therapy  Referral; Future  - Pain Management  Referral; Future    Chronic pain of left knee    - lidocaine (XYLOCAINE) 5 % external ointment; Apply topically as needed for moderate pain (qid as needed) Apply to feet and knees qid for 10 days, then as needed  - Occupational Therapy  Referral; Future  - Pain Management  Referral; Future    Impaired gait and mobility    - Occupational Therapy  Referral; Future  - Pain Management  Referral; Future  - Pain Management  Referral; Future    Contracture of joint of foot, unspecified laterality    - Occupational Therapy  Referral; Future  - Pain Management  Referral; Future  - Pain Management  Referral; Future    DIMITRI (generalized anxiety disorder)  Stable, follow up with Psychiatry.    ADHD: refilled medications      Patient had fractured his right first metatarsal bone, in [place], while visiting family, over 3 weeks ago.  He has been seen and followed by podiatry.  Currently, he is on multiple pain medications.    In addition, patient did have electric wheelchair, in which he had left in [place] and has no way of getting it back, he has no transportation, no resources to drive to [place] and get his old electric chair.    His  current chair is only been about 2 years old.    Now, he would like to be evaluated again to see if possibly he will be able to get a scooter.   I did put a referral for occupational therapy for evaluation,    In addition I did put a referral for [facility] and [facility] to see if patient will be able to get an appointment with one of these programs for comprehensive pain management, comprehensive physical therapy, occupational Occupational Therapy.    In addition, I gave him a letter for animal support.      David Verduzco is a 33 year old, presenting for the following health issues:  Patient Request (Podiatrist information for pain)        7/23/2024     1:51 PM   Additional Questions   Roomed by katiuska hassan     Discuss podiatrist information for pain medications.    Did have an appt at the [facility] location for medical marijuana.        Review of Systems  Constitutional, neuro, ENT, endocrine, pulmonary, cardiac, gastrointestinal, genitourinary, musculoskeletal, integument and psychiatric systems are negative, except as otherwise noted.      Objective    /75 (BP Location: Right arm, Patient Position: Chair, Cuff Size: Adult Large)   Pulse 100   Temp 98.2  F (36.8  C) (Oral)   Resp 25   Wt 91.6 kg (202 lb)   SpO2 97%   BMI 31.64 kg/m    Body mass index is 31.64 kg/m .  Physical Exam   GENERAL: alert and no distress  PSYCH: mentation appears normal, affect normal/bright    Orders Placed This Encounter   Procedures    Occupational Therapy  Referral    Pain Management  Referral    Pain Management  Referral            Signed Electronically by: Kole Gomez MD

## 2024-07-25 ENCOUNTER — TELEPHONE (OUTPATIENT)
Dept: FAMILY MEDICINE | Facility: CLINIC | Age: 34
End: 2024-07-25
Payer: MEDICARE

## 2024-07-25 DIAGNOSIS — G60.8 PERIPHERAL SENSORY NEUROPATHY: ICD-10-CM

## 2024-07-25 DIAGNOSIS — S99.921S INJURY, FOOT, RIGHT, SEQUELA: ICD-10-CM

## 2024-07-25 DIAGNOSIS — F90.2 ATTENTION DEFICIT HYPERACTIVITY DISORDER (ADHD), COMBINED TYPE: Primary | ICD-10-CM

## 2024-07-25 DIAGNOSIS — Q66.70 CAVUS DEFORMITY OF FOOT: ICD-10-CM

## 2024-07-25 RX ORDER — OXYCODONE HYDROCHLORIDE 5 MG/1
5 TABLET ORAL EVERY 6 HOURS PRN
Qty: 6 TABLET | Refills: 0 | Status: SHIPPED | OUTPATIENT
Start: 2024-07-25

## 2024-07-25 RX ORDER — DEXTROAMPHETAMINE SACCHARATE, AMPHETAMINE ASPARTATE, DEXTROAMPHETAMINE SULFATE AND AMPHETAMINE SULFATE 7.5; 7.5; 7.5; 7.5 MG/1; MG/1; MG/1; MG/1
TABLET ORAL
Qty: 90 TABLET | Refills: 0 | Status: SHIPPED | OUTPATIENT
Start: 2024-09-23

## 2024-07-25 RX ORDER — DEXTROAMPHETAMINE SACCHARATE, AMPHETAMINE ASPARTATE, DEXTROAMPHETAMINE SULFATE AND AMPHETAMINE SULFATE 7.5; 7.5; 7.5; 7.5 MG/1; MG/1; MG/1; MG/1
TABLET ORAL
Qty: 90 TABLET | Refills: 0 | Status: SHIPPED | OUTPATIENT
Start: 2024-08-24

## 2024-07-25 RX ORDER — DEXTROAMPHETAMINE SACCHARATE, AMPHETAMINE ASPARTATE, DEXTROAMPHETAMINE SULFATE AND AMPHETAMINE SULFATE 7.5; 7.5; 7.5; 7.5 MG/1; MG/1; MG/1; MG/1
TABLET ORAL
Qty: 90 TABLET | Refills: 0 | Status: SHIPPED | OUTPATIENT
Start: 2024-07-25

## 2024-07-25 NOTE — TELEPHONE ENCOUNTER
Called and talked to the patient. It was relayed to the patient that Dr. Hill sent a message over to his primary care provider and is waiting for his response. It was also mentioned that Dr. Hill sent in 1 more prescription over to his pharmacy and that he will not be able to provide any more pain medicine. He will have to rely on his current pain regimen until he see him pain clinic.  The patient verbally understood.

## 2024-07-25 NOTE — TELEPHONE ENCOUNTER
"[facility] pharmacy calling; patient's future adderall 30 mg Rx's got sent as the \"XR\" form; assume that is an error as they have the same directions as the current 30 mg immediate release form Rx received.    Will need this corrected before the next refill is due in a month.    Routed to Dr. Gomez to address, re-send correct future Rx's and discontinue the wrong ones.    Alma FISCHER RN  [facility] Triage    "

## 2024-07-25 NOTE — PROGRESS NOTES
Patient request refill of oxycodone for pain management, I communicated with primary care, will prescribe oxycodone for one prescription and then will need to rely on current pain medication regimen until sees pain clinic.  Follow up as scheduled in clinic.

## 2024-07-30 ENCOUNTER — TELEPHONE (OUTPATIENT)
Dept: PODIATRY | Facility: CLINIC | Age: 34
End: 2024-07-30

## 2024-07-30 NOTE — TELEPHONE ENCOUNTER
Other: pt called requested for appt for f/up on foot. Writer attempted to schedule appt, next available appt is 08/29. Pt declined and requested for writer to reach out to care team to see if care team will be able to squeeze pt in either VV or telephone visit due to pt is trying to get into specific programs. Pt goes to dialysis MWF and would most prefer Tuesdays or Thursdays appts. Please call back to advise.      Could we send this information to you in [employer] or would you prefer to receive a phone call?:   Patient would prefer a phone call   Okay to leave a detailed message?: Yes at Cell number on file:    Telephone Information:   Mobile [phone]

## 2024-07-31 ENCOUNTER — MYC MEDICAL ADVICE (OUTPATIENT)
Dept: PHYSICAL THERAPY | Facility: CLINIC | Age: 34
End: 2024-07-31
Payer: MEDICARE

## 2024-07-31 ENCOUNTER — TELEPHONE (OUTPATIENT)
Dept: FAMILY MEDICINE | Facility: CLINIC | Age: 34
End: 2024-07-31
Payer: MEDICARE

## 2024-07-31 ENCOUNTER — TELEPHONE (OUTPATIENT)
Dept: PODIATRY | Facility: CLINIC | Age: 34
End: 2024-07-31
Payer: MEDICARE

## 2024-07-31 DIAGNOSIS — S92.314S: Primary | ICD-10-CM

## 2024-07-31 DIAGNOSIS — G60.8 PERIPHERAL SENSORY NEUROPATHY: ICD-10-CM

## 2024-07-31 DIAGNOSIS — S99.921S INJURY, FOOT, RIGHT, SEQUELA: ICD-10-CM

## 2024-07-31 DIAGNOSIS — Q66.70 CAVUS DEFORMITY OF FOOT: ICD-10-CM

## 2024-07-31 RX ORDER — OXYCODONE HYDROCHLORIDE 5 MG/1
5 TABLET ORAL EVERY 6 HOURS PRN
Qty: 12 TABLET | Refills: 0 | Status: SHIPPED | OUTPATIENT
Start: 2024-07-31 | End: 2024-08-03

## 2024-07-31 NOTE — TELEPHONE ENCOUNTER
Other: Dax called returning a call he just received. Please call again     Could we send this information to you in [employer] or would you prefer to receive a phone call?:   Patient would prefer a phone call   Okay to leave a detailed message?: Yes at Cell number on file:    Telephone Information:   Mobile [phone]

## 2024-07-31 NOTE — TELEPHONE ENCOUNTER
"Called and talked  to the patient. It was mentioned that we received his message and it was relayed what Dr. Hill mentioned, \"He should follow-up with his primary physician as directed for pain management medications, and if he is in severe pain he should go to the emergency room for further evaluation and management.\" The patient talked about how he is [facility] and that it is going to be a weeks before he gets in to the pain clinic.     He mentioned that he is thinking of changing his primary care doctor or getting a second opinion. It was mentioned that is his decision, but Dr. Hill would like for him to follow up with his PCP for pain management. The patient was transferred to Dr. Hill to continue talking about pain management.  "

## 2024-07-31 NOTE — TELEPHONE ENCOUNTER
"Patient calling regarding his right foot pain.  He has seen PCP, podiatry, and referred to pain clinics  He feels that he keeps getting bounced around and has not gotten relief  Pain clinic booking out a month, \"I am not waiting a month\"  Patient does not want it to seem like he is just seeking pain meds, he just wants to find relief for his pain now.  He feels frustrated with his situation, not with providers   He stated, \"do I have to keep going to the ER if I need immediate relief? They just send a couple of pain meds\"  \"Do I have to break by other foot for someone to see the seriousness of this? I am in pain\"    Christiano Solis RN  [facility]    "

## 2024-07-31 NOTE — TELEPHONE ENCOUNTER
Pain Management Referral sent to [facility] at the fax number provided ([phone]).

## 2024-07-31 NOTE — TELEPHONE ENCOUNTER
Pt called with concern of his pain.     Pt has an upcoming visit with PORTER Hill for follow up of fracture. Pt's appt is on the Wait List.     Writer looked up pt's orders and saw that pt has an order from PORTER Hill for Pain Management. Pt's call transferred to Pain Management.    Pt would like a CALL BACK to discuss pain control. Please CALL pt. Thank you.

## 2024-07-31 NOTE — TELEPHONE ENCOUNTER
Called and talked to the patient. He mentioned that he ran out of pain medicine. It was mentioned that Dr. Hill would not be prescribe any more pain medicine. He talked about that the got a referral to pain management from Dr. Hill and his PCP.  He then mentioned that when he was in the hospital, years ago, a doctor came in and talked to him about rebreaking the bones in foot. He is think ing about this option again. It was relayed that this would be something that he can talk to Dr. Hill about at his next visit. It was mentioned that his next appointment was August 20th.

## 2024-07-31 NOTE — PROGRESS NOTES
Past Medical History:   Diagnosis Date    ADHD 2008    Alcohol abuse, in remission     Alcoholic cirrhosis of liver with ascites (H)     Anemia in chronic kidney disease     Chronic pain     Current smoker     End stage renal disease (H)     Hypotension, unspecified hypotension type     Malnutrition (H24)     Metabolic acidosis     Necrotizing pancreatitis     Pancreatic insufficiency     Pericarditis     Recurrent pleural effusion      Patient Active Problem List   Diagnosis    Necrotizing pancreatitis    ESRD (end stage renal disease) on dialysis (H)    Cirrhosis of liver (H)    Muscle weakness    Contracture of joint of foot, unspecified laterality    DIMITRI (generalized anxiety disorder)    Alcohol abuse, in remission    Chronic pain    Pancreatic insufficiency    Anemia in chronic kidney disease    Acquired bilateral pes cavus    Chronic pain of both feet    ADHD     Past Surgical History:   Procedure Laterality Date    AV FISTULA PLACEMENT, BRACHIOCEPHALIC Left 05/11/2022    [facility]    COMBINED ESOPHAGOSCOPY, GASTROSCOPY, DUODENOSCOPY (EGD), TISSUE APPOSI N/A 09/20/2021    Procedure: ESOPHAGOGASTRODUODENOSCOPY WITH SUTURE FISTULA CLOSURE, argon plasma coagulation;  Surgeon: Jose Quigley MD;  [facility]: [facility]    ENDOSCOPIC INSERTION TUBE GASTROSTOMY N/A 07/13/2021    Procedure: Upper endoscopy, INSERTION, PEG-J TUBE and Gastropexy;  Surgeon: Rayshawn Will MD;  [facility]    ENDOSCOPIC INSERTION TUBE GASTROSTOMY  09/13/2021    Procedure: PEG/J PLACEMENT.;  Surgeon: Rayshawn Will MD;  [facility]    ENDOSCOPIC ULTRASOUND UPPER GASTROINTESTINAL TRACT (GI) N/A 07/21/2021    Procedure: ENDOSCOPIC ULTRASOUND, ESOPHAGOSCOPY / UPPER GASTROINTESTINAL TRACT (GI) with cyst gastrostomy, dilation;  Surgeon: Guru Yecenia Chacko MD;  [facility]    ENDOSCOPIC ULTRASOUND, ESOPHAGOSCOPY, GASTROSCOPY, DUODENOSCOPY (EGD), NECROSECTOMY N/A 08/11/2021    Procedure:  "ESOPHAGOGASTRODUODENOSCOPY (EGD) with necrosectomy, stent exchange.;  Surgeon: Amadou Beasley MD;  [facility]    ESOPHAGOSCOPY, GASTROSCOPY, DUODENOSCOPY (EGD), COMBINED N/A 07/28/2021    Procedure: ESOPHAGOGASTRODUODENOSCOPY (EGD), gastrostomy apposition, Necrosectomy, stent exchange.;  Surgeon: Rayshawn Will MD;  [facility]    ESOPHAGOSCOPY, GASTROSCOPY, DUODENOSCOPY (EGD), COMBINED N/A 08/06/2021    Procedure: ESOPHAGOGASTRODUODENOSCOPY (EGD) with necrosectomy, and stents exchanged;  Surgeon: Jose Quigley MD;  [facility]    ESOPHAGOSCOPY, GASTROSCOPY, DUODENOSCOPY (EGD), COMBINED Left 09/13/2021    Procedure: ESOPHAGOGASTRODUODENOSCOPY (EGD), GASTROSTOMY TUBE REMOVAL, FISTULAR CLOSURE ENDOSCOPIC.;  Surgeon: Rayshawn Will MD;  [facility]    IR ABSCESS TUBE CHANGE  07/14/2021    IR NG TUBE PLACEMENT REQ RAD & FLUORO  10/26/2021    IR PARACENTESIS  07/21/2021    IR SINOGRAM INJECTION DIAGNOSTIC  08/25/2021    IR THORACENTESIS  02/05/2022    ORTHOPEDIC SURGERY Right     fracture repair    REPLACE GASTROSTOMY TUBE, PERCUTANEOUS N/A 07/28/2021    Procedure: gastro-jejunostomy tube exchange;  Surgeon: Rayshawn Will MD;  Location[facility]     Social History     Socioeconomic History    Marital status: Single     Spouse name: Not on file    Number of children: Not on file    Years of education: Not on file    Highest education level: Not on file   Occupational History    Not on file   Tobacco Use    Smoking status: Some Days     Current packs/day: 0.25     Types: Cigarettes     Passive exposure: Current    Smokeless tobacco: Never    Tobacco comments:     8/24/2021 Patient did not want to discuss quitting but would like 4 mg lozenge to use during admission. Patient also accepted \"Quitting for Good\" workbook   Vaping Use    Vaping status: Never Used    Passive vaping exposure: Yes   Substance and Sexual Activity    Alcohol use: Not Currently     Comment: \"no alcohol for " "over 1 year ago\" per pt on 5/12/22    Drug use: No    Sexual activity: Not Currently     Partners: Female   Other Topics Concern    Not on file   Social History Narrative    Not on file     Social Determinants of Health     Financial Resource Strain: Low Risk  (11/21/2023)    Financial Resource Strain     Within the past 12 months, have you or your family members you live with been unable to get utilities (heat, electricity) when it was really needed?: No   Recent Concern: Financial Resource Strain - Medium Risk (9/1/2023)    Overall Financial Resource Strain (CARDIA)     Difficulty of Paying Living Expenses: Somewhat hard   Food Insecurity: High Risk (11/21/2023)    Food Insecurity     Within the past 12 months, did you worry that your food would run out before you got money to buy more?: Yes     Within the past 12 months, did the food you bought just not last and you didn t have money to get more?: No   Transportation Needs: High Risk (11/21/2023)    Transportation Needs     Within the past 12 months, has lack of transportation kept you from medical appointments, getting your medicines, non-medical meetings or appointments, work, or from getting things that you need?: Yes   Physical Activity: Patient Declined (9/1/2023)    Exercise Vital Sign     Days of Exercise per Week: Patient declined     Minutes of Exercise per Session: Patient declined   Recent Concern: Physical Activity - Inactive (6/5/2023)    Exercise Vital Sign     Days of Exercise per Week: 0 days     Minutes of Exercise per Session: 0 min   Stress: Stress Concern Present (9/1/2023)    [employer] - Occupational Stress Questionnaire     Feeling of Stress : Rather much   Social Connections: Unknown (9/1/2023)    Social Connection and Isolation Panel [NHANES]     Frequency of Communication with Friends and Family: Twice a week     Frequency of Social Gatherings with Friends and Family: Patient declined     Attends Congregation " Services: Patient declined     Active Member of Clubs or Organizations: No     Attends Club or Organization Meetings: Never     Marital Status: Patient declined   Interpersonal Safety: Low Risk  (1/30/2024)    Interpersonal Safety     Do you feel physically and emotionally safe where you currently live?: Yes     Within the past 12 months, have you been hit, slapped, kicked or otherwise physically hurt by someone?: No     Within the past 12 months, have you been humiliated or emotionally abused in other ways by your partner or ex-partner?: No   Housing Stability: High Risk (11/21/2023)    Housing Stability     Do you have housing? : Yes     Are you worried about losing your housing?: Yes     Family History   Problem Relation Age of Onset    Attention Deficit Disorder Mother     Alcoholism Mother     Cancer Mother     Alcoholism Father     Alcoholism Sister     Alcoholism Brother            Discussed diagnosis and treatment options with patient on the phone today.  Refilled the oxycodone once.  And discussed with him he needs to follow-up with primary care pain management plan and if he needs further narcotic pain management and he is in that much pain he should go to the emergency room to help manage this and his other issues.  He was okay with us seeing if we can get him a  to help him with this so we will do that as well.  Follow-up with me in clinic as scheduled.

## 2024-07-31 NOTE — TELEPHONE ENCOUNTER
Other: pt called, can care team re-send the referral of pain management to [phone] [facility].     Could we send this information to you in [facility] or would you prefer to receive a phone call?:   Patient would prefer a phone call   Okay to leave a detailed message?: Yes at Cell number on file:    Telephone Information:   Mobile [phone]

## 2024-08-01 ENCOUNTER — PATIENT OUTREACH (OUTPATIENT)
Dept: CARE COORDINATION | Facility: CLINIC | Age: 34
End: 2024-08-01
Payer: MEDICARE

## 2024-08-01 ENCOUNTER — TELEPHONE (OUTPATIENT)
Dept: PODIATRY | Facility: CLINIC | Age: 34
End: 2024-08-01
Payer: MEDICARE

## 2024-08-01 NOTE — TELEPHONE ENCOUNTER
Instructed patient to make an appointment for pain management at [facility] as planned in previous office visit with Dr. Gomez on 7/23.     Patient states he may need pain management from PCP up until appointment with [facility], as they are booked out 3-4 weeks.    Transferred patient to scheduling to schedule virtual for follow-up with Dr. Gomez.     Kennedy Russell, RN  Triage Nurse  [facility]

## 2024-08-01 NOTE — PROGRESS NOTES
Social Work Telephone Message Note  [facility]     Patient Name:  Dax Villareal  /Age:  1990 (33 year old)    Referral Source: Dr Hill, podiatry  Reason for Referral:  support     contacted Patient via telephone on 24. Sw had received a consult to connect with patient regarding resources and support navigating. Writer spoke with Dax at great length, where he shared his concerns relating to his cares over the past couple of years. He is wanting someone to schedule and coordinate his care. Writer informed him that as a  I wouldn't be able to coordinate and schedule his appointments and attend them with him. That this would be a service he would either need a family/friend to do or he would pay for this service via a guardian or the like if he feels he is unable to do this for himself.  Much of his conversation were surrounding needing to have his feet broken so they would then heal more straight. He is wondering if this is something he should do before his transplant. Writer encouraged him to discuss this with his provider and send in a [employer] message. He also expressed frustration with the lack of communication between his PCP and Dr Hill. Writer offered to start a message/dialogue with them both to discuss this and his pain management. He then requested that I don't send that message and that he will wait and hear back from [facility] for his pain management appointment.      After an hour conversation, there wasn't anything he needed or wanted writer to assist with. Provided him with writer contact information and encouraged him to call with any additional concerns or questions.  Trixie will continue to assist as able.         RONNA Calle, [facility]    [facility]  [phone]  [email or web address]

## 2024-08-01 NOTE — TELEPHONE ENCOUNTER
Other: Patient is calling in to have Dr Hill send a pain management referral to [facility] for his foot pain. Please send to [phone]. Please call patient to discuss.      Could we send this information to you in [facility] or would you prefer to receive a phone call?:   Patient would prefer a phone call   Okay to leave a detailed message?: Yes at Cell number on file:    Telephone Information:   Mobile [phone]

## 2024-08-05 ENCOUNTER — TRANSFERRED RECORDS (OUTPATIENT)
Dept: HEALTH INFORMATION MANAGEMENT | Facility: CLINIC | Age: 34
End: 2024-08-05
Payer: MEDICARE

## 2024-08-06 ENCOUNTER — VIRTUAL VISIT (OUTPATIENT)
Dept: FAMILY MEDICINE | Facility: CLINIC | Age: 34
End: 2024-08-06
Payer: MEDICARE

## 2024-08-06 DIAGNOSIS — M24.576 CONTRACTURE OF JOINT OF FOOT, UNSPECIFIED LATERALITY: ICD-10-CM

## 2024-08-06 DIAGNOSIS — K21.9 GASTROESOPHAGEAL REFLUX DISEASE WITHOUT ESOPHAGITIS: ICD-10-CM

## 2024-08-06 DIAGNOSIS — S92.314D CLOSED NONDISPLACED FRACTURE OF FIRST METATARSAL BONE OF RIGHT FOOT WITH ROUTINE HEALING: Primary | ICD-10-CM

## 2024-08-06 DIAGNOSIS — M21.6X2 ACQUIRED BILATERAL PES CAVUS: ICD-10-CM

## 2024-08-06 DIAGNOSIS — M21.6X1 ACQUIRED BILATERAL PES CAVUS: ICD-10-CM

## 2024-08-06 PROCEDURE — 99213 OFFICE O/P EST LOW 20 MIN: CPT | Mod: 95 | Performed by: FAMILY MEDICINE

## 2024-08-06 RX ORDER — PANTOPRAZOLE SODIUM 40 MG/1
40 TABLET, DELAYED RELEASE ORAL DAILY
Qty: 90 TABLET | Refills: 2 | Status: SHIPPED | OUTPATIENT
Start: 2024-08-06

## 2024-08-06 NOTE — PROGRESS NOTES
Dax is a 33 year old who is being evaluated via a billable video visit.    How would you like to obtain your AVS? MyChart  If the video visit is dropped, the invitation should be resent by: Text to cell phone: [phone]  Will anyone else be joining your video visit? No      Assessment & Plan     Closed nondisplaced fracture of first metatarsal bone of right foot with routine healing  Contracture of joint of foot, unspecified laterality  Acquired bilateral pes cavus    He is doing better, his foot is healing, continue to follow-up with podiatry.    Recently, was seen by [facility] pain clinic, was given Ortho consult, doing, and other pain medication.  He reports he is scheduled to have an MRI of his lumbar region,  They are working to get him scheduled for spinal cord stimulation.  Was referred to physical therapy.  He will continue with [facility] pain management.      Gastroesophageal reflux disease without esophagitis  Diet modifications, continue with medicine as needed.  - pantoprazole (PROTONIX) 40 MG EC tablet; Take 1 tablet (40 mg) by mouth daily    In addition, he is scheduled for an electric scooter evaluation next week.    However, he was told that he may not qualify since his last electric wheelchair within the last 2 years.       Subjective   Dax is a 33 year old, presenting for the following health issues:  Pain        8/6/2024     5:01 PM   Additional Questions   Roomed by Shemar JAMA MA       Video Start Time:  5: 23 Pm    HPI     Patient did get to see [facility], was referred from podiatrist and did not like their attitude towards his pain management.     Patient did get referred to get a possible Lumbar nerve block and possible spinal cord stimulation.   Patient will getting a back MRI completed soon.     Patient says some of his medical notes may not be accurate, especially when he was intubated in the past and asked about his alcohol use.     Wants to know if these suggestions from pain clinic will affect his  transplant?     Wheel chair evaluation this Thursday.   Emotional support animal?     Can pt get a bed prescription?   Feels like his twin bed is not safe.       Review of Systems  Constitutional, neuro, ENT, endocrine, pulmonary, cardiac, gastrointestinal, genitourinary, musculoskeletal, integument and psychiatric systems are negative, except as otherwise noted.      Objective           Vitals:  No vitals were obtained today due to virtual visit.    Physical Exam   GENERAL: alert and no distress  EYES: Eyes grossly normal to inspection.  No discharge or erythema, or obvious scleral/conjunctival abnormalities.  RESP: No audible wheeze, cough, or visible cyanosis.    SKIN: Visible skin clear. No significant rash, abnormal pigmentation or lesions.  NEURO: Cranial nerves grossly intact.  Mentation and speech appropriate for age.  PSYCH: Appropriate affect, tone, and pace of words          Video-Visit Details    Type of service:  Video Visit   Video End Time: 5:44 PM  Originating Location (pt. Location): Home    Distant Location (provider location):  On-site  Platform used for Video Visit: La  Signed Electronically by: Kole Gomez MD

## 2024-08-08 ENCOUNTER — THERAPY VISIT (OUTPATIENT)
Dept: OCCUPATIONAL THERAPY | Facility: CLINIC | Age: 34
End: 2024-08-08
Payer: MEDICARE

## 2024-08-08 DIAGNOSIS — M25.562 CHRONIC PAIN OF LEFT KNEE: ICD-10-CM

## 2024-08-08 DIAGNOSIS — G89.29 CHRONIC PAIN OF LEFT KNEE: ICD-10-CM

## 2024-08-08 DIAGNOSIS — M21.6X2 ACQUIRED BILATERAL PES CAVUS: ICD-10-CM

## 2024-08-08 DIAGNOSIS — Z74.09 IMPAIRED MOBILITY AND ADLS: Primary | ICD-10-CM

## 2024-08-08 DIAGNOSIS — M24.576 CONTRACTURE OF JOINT OF FOOT, UNSPECIFIED LATERALITY: ICD-10-CM

## 2024-08-08 DIAGNOSIS — R26.89 IMPAIRED GAIT AND MOBILITY: ICD-10-CM

## 2024-08-08 DIAGNOSIS — M21.6X1 ACQUIRED BILATERAL PES CAVUS: ICD-10-CM

## 2024-08-08 DIAGNOSIS — Z78.9 IMPAIRED MOBILITY AND ADLS: Primary | ICD-10-CM

## 2024-08-08 PROCEDURE — 97542 WHEELCHAIR MNGMENT TRAINING: CPT | Mod: GO | Performed by: OCCUPATIONAL THERAPIST

## 2024-08-08 NOTE — PROGRESS NOTES
"SEATING AND WHEELED MOBILITY ASSESSMENT      Quick Adds   Quick Adds Current Power Wheelchair   General Information    Rehab Discipline OT   Funding Medicare/[employer] MA/Waiver- - Margarita- [id number]   Service Outpatient;Occupational Therapy;Seating/Wheeled Mobility Evaluation   Height 5'7\"   Weight 202   Start Of Care Date August 8, 2024     Referring Physician Kole Gomez MD   Orders Evaluate And Treat As Indicated;Per Therapist Evaluation   Orders Date 7/23/4   Patient/Caregiver Goals Power mobility replacement   Rehabilitation Technology Supplier [employer]   Current Community Support Personal Care Attendant;Family/Friend Caregiver;Transportation Service  (70 hours a week)   Patient role/Employment history Disabled   Fall Risk Screen   Fall screen completed by OT   Have you fallen 2 or more times in the past year? No   Have you fallen and had an injury in the past year? No   Is patient a fall risk? Yes   Medical History   Onset Of Illness/injury Or Date Of Surgery 7/23/24   Medical Diagnosis ADHD  Date Unknown  Alcohol abuse, in remission  Date Unknown  Alcoholic cirrhosis of liver with ascites (H)  Date Unknown  Anemia in chronic kidney disease  Date Unknown  Chronic pain  Date Unknown  Current smoker  Date Unknown  End stage renal disease (H)  Date Unknown  Hypotension, unspecified hypotension type  Date Unknown  Malnutrition (H)  Date Unknown  Metabolic acidosis  Date Unknown  Necrotizing pancreatitis  Date Unknown  Pancreatic insufficiency  Date Unknown  Pericarditis  Date Unknown  Recurrent pleural effusion   Medical History Pes cavis   Comments Dialysis 3 days a week,   Current Power Wheelchair   Power Wheelchair Comments J4 power wheelchair not in his possession   Home Accessibility   Living Environment Apartment/condo   Primary Entrance Level;Elevator   All Rooms Wheelchair Accessible Yes   Community ADL   Transportation Transportation Services   Community Mobility Requirements " Medical Appointments;Shopping   Cognitive/Visual/Hearing Status   Observations No Problems Observed During Evaluation   Vision Intact   Hearing Intact   ADL Status   Feeding Independent   Grooming/Hygiene Independent   Dressing Requires Assist   Toileting Uses Equipment;Independent  (grab bars)   Bathing Independent  (grab bars and shower chair)   Meal Preparation Requires Assist   Home Management Requires Assist   Balance   Unsupported Sitting Balance Uses Upper Extremities for Balance   Sitting Balance in Chair Uses Upper Extremities for Balance   Standing Balance Uses Upper Extremities for Balance   Ambulation   Ambulation independent   Ambulation Comments R cam boot and 4ww today with great effort and pain   Transfers   Transfer Assist Independent   Transfer Method Stand Pivot   Sleep/Rest   Sleep Surface/Equipment standard bed   Wheelchair Ability   Wheelchair Ability Quick Adds Power Chair;Wheelchair Use   Power Wheelchair Propulsion   Operate Power Wheelchair Independent;Standard Joystick   Wheelchair Use   Ability to Perform Weight Shifts Independent   Bed Confined Without Wheelchair? Yes   Hours in Wheelchair Daily 10   Neuromuscular   History of Pressure Sores No   Pain Yes   Pain Location LE with walking   Pain Scale  10   Coordination UE Intact;LE Intact   Sensory Deficits Reported B Le neuropathies   Head and Neck   Head and Neck Position Functional   Upper Extremities   UE ROM WFL   UE Strength 4+/5   Dominance Right   Pelvis   Anterior/Posterior Pelvis Position Posterior Tilt   Trunk   Anterior/Posterior Trunk Position Increased Thoracic Kyphosis   Lower Extremities   LE ROM WFL with limited ankle ranges to neutral only   LE Strength 4+/5 with ankle and feet limitations due to contractures   Foot Positioning Inversion   LE Comments foot deformities   Patient Measurements   Other per atp notes   Education Assessment   Barriers to Learning Physical   Preferred Learning Style Listening;Demonstration    Assessment/Plan   Criteria for Skilled Interventions Met Yes, Treatment Indicated   Treatment Diagnosis impaired participation in MRADLS   Therapy Frequency once   Planned Therapy Interventions Wheelchair Management/Propulsion Training   Planned Therapy Interventions Comments Determined need for replacement scooter as his current power chair is in another state due to social challenges.  He now has a broken foot and still requires assist for safe and independent mobility.  Safe trials today in clinic of scooter.   Risks and benefits of treatment have been explained Yes   Patient/family & other staff in agreement with plan of care Yes   Session Time   OT Wheelchair Management Minutes (33130) 30   Adult OT Eval Goals   OT Eval Goals (Adult) 1    OT Goal 1   Goal Identifier wheelchair   Goal Description Patient/family demonstrates understanding of equipment for independent mobility, including benefits/limitations;   Goal Progress met today   Target Date August 8, 2024   Date Met August 8, 2024   Cristiane Pacheco 10 HD Power Operated Vehicle / Scooter -  This device is being requested for this patient with mobility impairments to allow him to be able to complete all of his mobility related activities of daily living in a safe fashion, without risk of injury from falling, and in a reasonable time frame. He demonstrated during the home evaluation that he was able to transfer to/from the requested scooter, operate the tiller steering system, able to maintain postural stability and position while operating the POV, and operate the on/off mechanism and the speed dial appropriately and safely. They are very willing and physically / cognitively able to use the recommended equipment to assist with mobility related activities of daily living and general mobility. There is a mobility limitation that cannot be sufficiently and safely resolved by the use of an appropriately fitted cane or walker and they do not have sufficient upper  extremity function to self-propel an optimally-configured manual wheelchair in their home to perform MRADLs during a typical day due to limitations in strength, endurance, range of motion, and coordination. This equipment is reasonable and necessary with reference to accepted standards of medical practice and treatment of this patient's condition and is not being recommended as a convenience item. Without this recommended equipment, he is highly likely to sustain injuries from falls, develop pressure sores or postural compensation, and/or be bed confined, which those costs far exceed the cost of the requested equipment.  HD version requested in order to best fit patient as standard size was hitting his legs and causing safety issues.   This therapist has no financial connection to the equipment being requested or vendor being used.       I have read and concur with the above recommendations.     Physician Printed Name __________________________________________     Physician Signature  _____________________________________________     Date of Signature ______________________________     Physician Phone  ______________________________        Electronically signed by:  Jessi ROTH ATP      Occupational Therapist, Assistive   [phone]      fax: [phone]      [email or web address], [address]  [facility] [address]  [place] [address]  August 22, 2024

## 2024-08-13 ENCOUNTER — TELEPHONE (OUTPATIENT)
Dept: FAMILY MEDICINE | Facility: CLINIC | Age: 34
End: 2024-08-13

## 2024-08-13 NOTE — TELEPHONE ENCOUNTER
Patient calling to update us on a few things.    We will receive forms from [employer] for Dr. Gomez to sign .    He is seeking to get a service dog , gave him name of several different organizations he can call.    He had questions about drivers license and I instructed him to call DMV.    Did active listening for patient .    Christine M Klisch, RN

## 2024-08-20 ENCOUNTER — ANCILLARY PROCEDURE (OUTPATIENT)
Dept: GENERAL RADIOLOGY | Facility: CLINIC | Age: 34
End: 2024-08-20
Attending: PODIATRIST
Payer: MEDICARE

## 2024-08-20 ENCOUNTER — OFFICE VISIT (OUTPATIENT)
Dept: PODIATRY | Facility: CLINIC | Age: 34
End: 2024-08-20
Payer: MEDICARE

## 2024-08-20 DIAGNOSIS — Q66.70 CAVUS DEFORMITY OF FOOT: Primary | ICD-10-CM

## 2024-08-20 DIAGNOSIS — S92.314S: ICD-10-CM

## 2024-08-20 DIAGNOSIS — G60.8 PERIPHERAL SENSORY NEUROPATHY: ICD-10-CM

## 2024-08-20 DIAGNOSIS — S99.921S INJURY, FOOT, RIGHT, SEQUELA: ICD-10-CM

## 2024-08-20 PROCEDURE — 99214 OFFICE O/P EST MOD 30 MIN: CPT | Performed by: PODIATRIST

## 2024-08-20 PROCEDURE — 73630 X-RAY EXAM OF FOOT: CPT | Mod: RT | Performed by: RADIOLOGY

## 2024-08-20 RX ORDER — BUPRENORPHINE 5 UG/H
1 PATCH TRANSDERMAL
COMMUNITY

## 2024-08-20 NOTE — PROGRESS NOTES
Past Medical History:   Diagnosis Date    ADHD 2008    Alcohol abuse, in remission     Alcoholic cirrhosis of liver with ascites (H)     Anemia in chronic kidney disease     Chronic pain     Current smoker     End stage renal disease (H)     Hypotension, unspecified hypotension type     Malnutrition (H24)     Metabolic acidosis     Necrotizing pancreatitis     Pancreatic insufficiency     Pericarditis     Recurrent pleural effusion      Patient Active Problem List   Diagnosis    Necrotizing pancreatitis    ESRD (end stage renal disease) on dialysis (H)    Cirrhosis of liver (H)    Muscle weakness    Contracture of joint of foot, unspecified laterality    DIMITRI (generalized anxiety disorder)    Alcohol abuse, in remission    Chronic pain    Pancreatic insufficiency    Anemia in chronic kidney disease    Acquired bilateral pes cavus    Chronic pain of both feet    ADHD     Past Surgical History:   Procedure Laterality Date    AV FISTULA PLACEMENT, BRACHIOCEPHALIC Left 05/11/2022    [facility]    COMBINED ESOPHAGOSCOPY, GASTROSCOPY, DUODENOSCOPY (EGD), TISSUE APPOSI N/A 09/20/2021    Procedure: ESOPHAGOGASTRODUODENOSCOPY WITH SUTURE FISTULA CLOSURE, argon plasma coagulation;  Surgeon: Jose Quigley MD;  [facility]    ENDOSCOPIC INSERTION TUBE GASTROSTOMY N/A 07/13/2021    Procedure: Upper endoscopy, INSERTION, PEG-J TUBE and Gastropexy;  Surgeon: Rayshawn Will MD;  [facility]    ENDOSCOPIC INSERTION TUBE GASTROSTOMY  09/13/2021    Procedure: PEG/J PLACEMENT.;  Surgeon: Rayshawn Will MD;  [facility]    ENDOSCOPIC ULTRASOUND UPPER GASTROINTESTINAL TRACT (GI) N/A 07/21/2021    Procedure: ENDOSCOPIC ULTRASOUND, ESOPHAGOSCOPY / UPPER GASTROINTESTINAL TRACT (GI) with cyst gastrostomy, dilation;  Surgeon: Guru Yecenia Chacko MD;  [facility]    ENDOSCOPIC ULTRASOUND, ESOPHAGOSCOPY, GASTROSCOPY, DUODENOSCOPY (EGD), NECROSECTOMY N/A 08/11/2021    Procedure:  "ESOPHAGOGASTRODUODENOSCOPY (EGD) with necrosectomy, stent exchange.;  Surgeon: Amadou Beasley MD;  [facility]    ESOPHAGOSCOPY, GASTROSCOPY, DUODENOSCOPY (EGD), COMBINED N/A 07/28/2021    Procedure: ESOPHAGOGASTRODUODENOSCOPY (EGD), gastrostomy apposition, Necrosectomy, stent exchange.;  Surgeon: Rayshawn Will MD;  [facility]    ESOPHAGOSCOPY, GASTROSCOPY, DUODENOSCOPY (EGD), COMBINED N/A 08/06/2021    Procedure: ESOPHAGOGASTRODUODENOSCOPY (EGD) with necrosectomy, and stents exchanged;  Surgeon: Jose Quigely MD;  [facility]    ESOPHAGOSCOPY, GASTROSCOPY, DUODENOSCOPY (EGD), COMBINED Left 09/13/2021    Procedure: ESOPHAGOGASTRODUODENOSCOPY (EGD), GASTROSTOMY TUBE REMOVAL, FISTULAR CLOSURE ENDOSCOPIC.;  Surgeon: Rayshawn Will MD;  [facility]    IR ABSCESS TUBE CHANGE  07/14/2021    IR NG TUBE PLACEMENT REQ RAD & FLUORO  10/26/2021    IR PARACENTESIS  07/21/2021    IR SINOGRAM INJECTION DIAGNOSTIC  08/25/2021    IR THORACENTESIS  02/05/2022    ORTHOPEDIC SURGERY Right     fracture repair    REPLACE GASTROSTOMY TUBE, PERCUTANEOUS N/A 07/28/2021    Procedure: gastro-jejunostomy tube exchange;  Surgeon: Rayshawn Will MD;  [facility]     Social History     Socioeconomic History    Marital status: Single     Spouse name: Not on file    Number of children: Not on file    Years of education: Not on file    Highest education level: Not on file   Occupational History    Not on file   Tobacco Use    Smoking status: Some Days     Current packs/day: 0.25     Types: Cigarettes     Passive exposure: Current    Smokeless tobacco: Never    Tobacco comments:     8/24/2021 Patient did not want to discuss quitting but would like 4 mg lozenge to use during admission. Patient also accepted \"Quitting for Good\" workbook   Vaping Use    Vaping status: Never Used    Passive vaping exposure: Yes   Substance and Sexual Activity    Alcohol use: Not Currently     Comment: \"no alcohol for " "over 1 year ago\" per pt on 5/12/22    Drug use: No    Sexual activity: Not Currently     Partners: Female   Other Topics Concern    Not on file   Social History Narrative    Not on file     Social Determinants of Health     Financial Resource Strain: Low Risk  (11/21/2023)    Financial Resource Strain     Within the past 12 months, have you or your family members you live with been unable to get utilities (heat, electricity) when it was really needed?: No   Recent Concern: Financial Resource Strain - Medium Risk (9/1/2023)    Overall Financial Resource Strain (CARDIA)     Difficulty of Paying Living Expenses: Somewhat hard   Food Insecurity: High Risk (11/21/2023)    Food Insecurity     Within the past 12 months, did you worry that your food would run out before you got money to buy more?: Yes     Within the past 12 months, did the food you bought just not last and you didn t have money to get more?: No   Transportation Needs: High Risk (11/21/2023)    Transportation Needs     Within the past 12 months, has lack of transportation kept you from medical appointments, getting your medicines, non-medical meetings or appointments, work, or from getting things that you need?: Yes   Physical Activity: Patient Declined (9/1/2023)    Exercise Vital Sign     Days of Exercise per Week: Patient declined     Minutes of Exercise per Session: Patient declined   Recent Concern: Physical Activity - Inactive (6/5/2023)    Exercise Vital Sign     Days of Exercise per Week: 0 days     Minutes of Exercise per Session: 0 min   Stress: Stress Concern Present (9/1/2023)    [employer] - Occupational Stress Questionnaire     Feeling of Stress : Rather much   Social Connections: Unknown (9/1/2023)    Social Connection and Isolation Panel [NHANES]     Frequency of Communication with Friends and Family: Twice a week     Frequency of Social Gatherings with Friends and Family: Patient declined     Attends Sikh " Services: Patient declined     Active Member of Clubs or Organizations: No     Attends Club or Organization Meetings: Never     Marital Status: Patient declined   Interpersonal Safety: Low Risk  (1/30/2024)    Interpersonal Safety     Do you feel physically and emotionally safe where you currently live?: Yes     Within the past 12 months, have you been hit, slapped, kicked or otherwise physically hurt by someone?: No     Within the past 12 months, have you been humiliated or emotionally abused in other ways by your partner or ex-partner?: No   Housing Stability: High Risk (11/21/2023)    Housing Stability     Do you have housing? : Yes     Are you worried about losing your housing?: Yes     Family History   Problem Relation Age of Onset    Attention Deficit Disorder Mother     Alcoholism Mother     Cancer Mother     Alcoholism Father     Alcoholism Sister     Alcoholism Brother        Subjective findings- 33-year-old returns clinic for right foot injury with first metatarsal proximal fracture.  Relates he still has pain but it is better, relates wearing the cam boot, relates he got into pain clinic and is following with them, relates to not doing physical therapy at this time, and has not got her into see orthopedic surgery but would like to do that relates he is also getting pain in the left foot feels like it is on the both sides of the foot and the tendons feels like if he walks too much it locks up and he has to stop and stretch it out.    Objective findings- vascular status intact bilaterally.  Has a cavus foot bilaterally.  Relates he gets pain on the inside and outside of the foot and ankle bilaterally as well as the midfoot.  There is no erythema, no edema, no drainage, no odor, no calor, no tendon voids bilaterally.  X-rays 3 views right foot compared to 7/10/2024 right foot x-rays, 7/5/2024 CT report and 7/5/2024 x-ray right foot report and to 26 2024 x-ray right foot, today's findings with diminution of  the fracture line on the proximal first metatarsal, joint and cortical margins are intact.    Assessment and plan- Right foot injury, proximal first metatarsal nondisplaced fracture, cavus foot, foot and ankle pain bilaterally.  Diagnosis and treatment options discussed with the patient.  He can gradually decrease the cam boot use as tolerated.  Prescription for custom for orthotics given and patient is given the phone number address to the orthotics and prosthetics lab and use discussed with them.  Prescription for physical therapy given use discussed with him.  Referral to orthopedic surgery given and use discussed discussed with him.  Follow-up with pain clinic as scheduled.  Previous notes reviewed.  Return to clinic and see me as needed.                                                      Moderate level of medical decision making.

## 2024-08-20 NOTE — LETTER
8/20/2024      Dxa Villareal  [address]  [place] [address]      Dear Colleague,    Thank you for referring your patient, Dax Villareal, to the [facility]. Please see a copy of my visit note below.    Past Medical History:   Diagnosis Date     ADHD 2008     Alcohol abuse, in remission      Alcoholic cirrhosis of liver with ascites (H)      Anemia in chronic kidney disease      Chronic pain      Current smoker      End stage renal disease (H)      Hypotension, unspecified hypotension type      Malnutrition (H24)      Metabolic acidosis      Necrotizing pancreatitis      Pancreatic insufficiency      Pericarditis      Recurrent pleural effusion      Patient Active Problem List   Diagnosis     Necrotizing pancreatitis     ESRD (end stage renal disease) on dialysis (H)     Cirrhosis of liver (H)     Muscle weakness     Contracture of joint of foot, unspecified laterality     DIMITRI (generalized anxiety disorder)     Alcohol abuse, in remission     Chronic pain     Pancreatic insufficiency     Anemia in chronic kidney disease     Acquired bilateral pes cavus     Chronic pain of both feet     ADHD     Past Surgical History:   Procedure Laterality Date     AV FISTULA PLACEMENT, BRACHIOCEPHALIC Left 05/11/2022    [facility]     COMBINED ESOPHAGOSCOPY, GASTROSCOPY, DUODENOSCOPY (EGD), TISSUE APPOSI N/A 09/20/2021    Procedure: ESOPHAGOGASTRODUODENOSCOPY WITH SUTURE FISTULA CLOSURE, argon plasma coagulation;  Surgeon: Jose Quigley MD;  [facility]     ENDOSCOPIC INSERTION TUBE GASTROSTOMY N/A 07/13/2021    Procedure: Upper endoscopy, INSERTION, PEG-J TUBE and Gastropexy;  Surgeon: Rayshawn Will MD;  [facility]     ENDOSCOPIC INSERTION TUBE GASTROSTOMY  09/13/2021    Procedure: PEG/J PLACEMENT.;  Surgeon: Rayshawn Will MD;  [facility]     ENDOSCOPIC ULTRASOUND UPPER GASTROINTESTINAL TRACT (GI) N/A 07/21/2021    Procedure: ENDOSCOPIC ULTRASOUND,  ESOPHAGOSCOPY / UPPER GASTROINTESTINAL TRACT (GI) with cyst gastrostomy, dilation;  Surgeon: Guru Yecenia Chacko MD;  [facility]     ENDOSCOPIC ULTRASOUND, ESOPHAGOSCOPY, GASTROSCOPY, DUODENOSCOPY (EGD), NECROSECTOMY N/A 08/11/2021    Procedure: ESOPHAGOGASTRODUODENOSCOPY (EGD) with necrosectomy, stent exchange.;  Surgeon: Amadou Beasley MD;  [facility]     ESOPHAGOSCOPY, GASTROSCOPY, DUODENOSCOPY (EGD), COMBINED N/A 07/28/2021    Procedure: ESOPHAGOGASTRODUODENOSCOPY (EGD), gastrostomy apposition, Necrosectomy, stent exchange.;  Surgeon: Rayshawn Will MD;  [facility]     ESOPHAGOSCOPY, GASTROSCOPY, DUODENOSCOPY (EGD), COMBINED N/A 08/06/2021    Procedure: ESOPHAGOGASTRODUODENOSCOPY (EGD) with necrosectomy, and stents exchanged;  Surgeon: Jose Quigley MD;  [facility]     ESOPHAGOSCOPY, GASTROSCOPY, DUODENOSCOPY (EGD), COMBINED Left 09/13/2021    Procedure: ESOPHAGOGASTRODUODENOSCOPY (EGD), GASTROSTOMY TUBE REMOVAL, FISTULAR CLOSURE ENDOSCOPIC.;  Surgeon: Rayshawn Will MD;  [facility]     IR ABSCESS TUBE CHANGE  07/14/2021     IR NG TUBE PLACEMENT REQ RAD & FLUORO  10/26/2021     IR PARACENTESIS  07/21/2021     IR SINOGRAM INJECTION DIAGNOSTIC  08/25/2021     IR THORACENTESIS  02/05/2022     ORTHOPEDIC SURGERY Right     fracture repair     REPLACE GASTROSTOMY TUBE, PERCUTANEOUS N/A 07/28/2021    Procedure: gastro-jejunostomy tube exchange;  Surgeon: Rayshawn Will MD;  [facility]     Social History     Socioeconomic History     Marital status: Single     Spouse name: Not on file     Number of children: Not on file     Years of education: Not on file     Highest education level: Not on file   Occupational History     Not on file   Tobacco Use     Smoking status: Some Days     Current packs/day: 0.25     Types: Cigarettes     Passive exposure: Current     Smokeless tobacco: Never     Tobacco comments:     8/24/2021 Patient did not want to  "discuss quitting but would like 4 mg lozenge to use during admission. Patient also accepted \"Quitting for Good\" workbook   Vaping Use     Vaping status: Never Used     Passive vaping exposure: Yes   Substance and Sexual Activity     Alcohol use: Not Currently     Comment: \"no alcohol for over 1 year ago\" per pt on 5/12/22     Drug use: No     Sexual activity: Not Currently     Partners: Female   Other Topics Concern     Not on file   Social History Narrative     Not on file     Social Determinants of Health     Financial Resource Strain: Low Risk  (11/21/2023)    Financial Resource Strain      Within the past 12 months, have you or your family members you live with been unable to get utilities (heat, electricity) when it was really needed?: No   Recent Concern: Financial Resource Strain - Medium Risk (9/1/2023)    Overall Financial Resource Strain (CARDIA)      Difficulty of Paying Living Expenses: Somewhat hard   Food Insecurity: High Risk (11/21/2023)    Food Insecurity      Within the past 12 months, did you worry that your food would run out before you got money to buy more?: Yes      Within the past 12 months, did the food you bought just not last and you didn t have money to get more?: No   Transportation Needs: High Risk (11/21/2023)    Transportation Needs      Within the past 12 months, has lack of transportation kept you from medical appointments, getting your medicines, non-medical meetings or appointments, work, or from getting things that you need?: Yes   Physical Activity: Patient Declined (9/1/2023)    Exercise Vital Sign      Days of Exercise per Week: Patient declined      Minutes of Exercise per Session: Patient declined   Recent Concern: Physical Activity - Inactive (6/5/2023)    Exercise Vital Sign      Days of Exercise per Week: 0 days      Minutes of Exercise per Session: 0 min   Stress: Stress Concern Present (9/1/2023)    [employer] - Occupational Stress " Questionnaire      Feeling of Stress : Rather much   Social Connections: Unknown (9/1/2023)    Social Connection and Isolation Panel [NHANES]      Frequency of Communication with Friends and Family: Twice a week      Frequency of Social Gatherings with Friends and Family: Patient declined      Attends Zoroastrianism Services: Patient declined      Active Member of Clubs or Organizations: No      Attends Club or Organization Meetings: Never      Marital Status: Patient declined   Interpersonal Safety: Low Risk  (1/30/2024)    Interpersonal Safety      Do you feel physically and emotionally safe where you currently live?: Yes      Within the past 12 months, have you been hit, slapped, kicked or otherwise physically hurt by someone?: No      Within the past 12 months, have you been humiliated or emotionally abused in other ways by your partner or ex-partner?: No   Housing Stability: High Risk (11/21/2023)    Housing Stability      Do you have housing? : Yes      Are you worried about losing your housing?: Yes     Family History   Problem Relation Age of Onset     Attention Deficit Disorder Mother      Alcoholism Mother      Cancer Mother      Alcoholism Father      Alcoholism Sister      Alcoholism Brother        Subjective findings- 33-year-old returns clinic for right foot injury with first metatarsal proximal fracture.  Relates he still has pain but it is better, relates wearing the cam boot, relates he got into pain clinic and is following with them, relates to not doing physical therapy at this time, and has not got her into see orthopedic surgery but would like to do that relates he is also getting pain in the left foot feels like it is on the both sides of the foot and the tendons feels like if he walks too much it locks up and he has to stop and stretch it out.    Objective findings- vascular status intact bilaterally.  Has a cavus foot bilaterally.  Relates he gets pain on the inside and outside of the foot and  ankle bilaterally as well as the midfoot.  There is no erythema, no edema, no drainage, no odor, no calor, no tendon voids bilaterally.  X-rays 3 views right foot compared to 7/10/2024 right foot x-rays, 7/5/2024 CT report and 7/5/2024 x-ray right foot report and to 26 2024 x-ray right foot, today's findings with diminution of the fracture line on the proximal first metatarsal, joint and cortical margins are intact.    Assessment and plan- Right foot injury, proximal first metatarsal nondisplaced fracture, cavus foot, foot and ankle pain bilaterally.  Diagnosis and treatment options discussed with the patient.  He can gradually decrease the cam boot use as tolerated.  Prescription for custom for orthotics given and patient is given the phone number address to the orthotics and prosthetics lab and use discussed with them.  Prescription for physical therapy given use discussed with him.  Referral to orthopedic surgery given and use discussed discussed with him.  Follow-up with pain clinic as scheduled.  Previous notes reviewed.  Return to clinic and see me as needed.                                                      Moderate level of medical decision making.      Again, thank you for allowing me to participate in the care of your patient.        Sincerely,        Dev Hill DPM

## 2024-08-21 ENCOUNTER — TELEPHONE (OUTPATIENT)
Dept: FAMILY MEDICINE | Facility: CLINIC | Age: 34
End: 2024-08-21
Payer: MEDICARE

## 2024-08-21 DIAGNOSIS — R26.89 IMPAIRED GAIT AND MOBILITY: Primary | ICD-10-CM

## 2024-08-21 DIAGNOSIS — M24.576 CONTRACTURE OF JOINT OF FOOT, UNSPECIFIED LATERALITY: ICD-10-CM

## 2024-08-21 NOTE — TELEPHONE ENCOUNTER
Patient calling, states that he has been seen recently for back and foot concerns, notes that he is awaiting to hear back form podiatrist on options for possible consultation and next steps for breaking feet to reset and heal correctly, notes that he is awaiting their response at this time.    Patient states that Dr. Gomez had placed a PT referral for [facility] and Dr. Hill had placed a referral for [facility] PT for foot concerns. Patient is stating that he would like to seek pool therapy through [facility] and is asking for orders.    Patient states that he has done PT and after a time it does not work for him because he needs someone to help him with his exercises. Patient asking PCP if there are other resources to a skilled  or specialized gym who could focus on his feet in addition to pool therapy with [facility].    Patient also states that he had a Cane DME order placed on 5/21/24 for single tip care. Patient notes that his cane came to him bent and was told by the DME supplier that patient would just need a new order placed to receive a new undamaged cane. Patient asking for new Cane DME order through [employer].     CB#: [id number], dvm ok    Thanks,  DAMASO Aldridge RN  [facility]

## 2024-08-23 NOTE — TELEPHONE ENCOUNTER
Pt called to follow up in-regards to orders below,    TC explained that all the orders were placed in correctly and that he just needed to call [facility] in regards to the new order for the cane and to reach out to [facility] to make sure they received the new referral that was faxed to them     Pt verbalized he understood and that he would reach out to both locations.    Did active listening, as patient went on and changed the subject to complain about his [facility] clinic, grace santos , he also wanted to know if his meds were sent in and if they were ready for pick       Azalea Bell    [facility]

## 2024-08-23 NOTE — TELEPHONE ENCOUNTER
TC did reached out to [facility]     They did receive the fax,  per (Ailyn) states that the referral was received the order has been placed since the 21st but that the location that does pool therapy is closed today and won't be open until Monday , then at that time they will reach out to the patient once the referral has been overviewed to make sure it's something that they can assist with for the patient. Ailyn stated that they advised him with this information as well.

## 2024-08-26 NOTE — TELEPHONE ENCOUNTER
Tc followed up on this the referral was sent to the [place] location as that's the only location that does pool therapy     Faxed to [phone]

## 2024-08-27 ENCOUNTER — TELEPHONE (OUTPATIENT)
Dept: FAMILY MEDICINE | Facility: CLINIC | Age: 34
End: 2024-08-27
Payer: MEDICARE

## 2024-08-27 NOTE — TELEPHONE ENCOUNTER
Patient calling stating that [facility] [employer] [facility] should be contacting clinic, sending form in regards to getting electric scooter.     They will need OV notes from 7/23/24 visit with Dr. Gomez and virtual visit notes 8/6/24 discussing scooter need.     Please check if this form has been received.       Mirna Bearden RN on 8/27/2024 at 4:33 PM

## 2024-08-27 NOTE — TELEPHONE ENCOUNTER
We received the form from [employer] [place] requesting any recent office notes for the need of the scooter    TC printed off OV notes from 7/23/2024 and 8/06/2024 and faxed back to [facility]  [facility] at [phone]    I did advise that if further information was needed to please reach out directly to the care team.

## 2024-08-28 NOTE — TELEPHONE ENCOUNTER
REASON FOR VISIT: Q66.70 (ICD-10-CM) - Cavus deformity of foot  G60.8 (ICD-10-CM) - Peripheral sensory neuropathy    DATE OF APPT: 8/29/2024   NOTES (FOR ALL VISITS) STATUS DETAILS   OFFICE NOTE from referring provider Internal [facility]  Dev Hill DPM 8/20/2024   MEDICATION LIST Internal    IMAGING  (FOR ALL VISITS)     XR Internal [facility]  XR Foot right 8/20/2024    [facility]  XR Foot right 7/10/2024   MRI (HEAD, NECK, SPINE) N/A    CT (HEAD, NECK, SPINE) N/A

## 2024-08-29 ENCOUNTER — TELEPHONE (OUTPATIENT)
Dept: FAMILY MEDICINE | Facility: CLINIC | Age: 34
End: 2024-08-29

## 2024-08-29 ENCOUNTER — PRE VISIT (OUTPATIENT)
Dept: NEUROLOGY | Facility: CLINIC | Age: 34
End: 2024-08-29

## 2024-08-29 NOTE — TELEPHONE ENCOUNTER
Forms received from [employer] /scooter documents-letter of medical necessity  for .  Forms placed in provider 'sign me' folder.  Please fax forms to [phone] after completion.

## 2024-09-03 ENCOUNTER — VIRTUAL VISIT (OUTPATIENT)
Dept: FAMILY MEDICINE | Facility: CLINIC | Age: 34
End: 2024-09-03
Payer: MEDICARE

## 2024-09-03 DIAGNOSIS — G89.29 CHRONIC PAIN OF BOTH FEET: Primary | ICD-10-CM

## 2024-09-03 DIAGNOSIS — M79.671 CHRONIC PAIN OF BOTH FEET: Primary | ICD-10-CM

## 2024-09-03 DIAGNOSIS — M79.672 CHRONIC PAIN OF BOTH FEET: Primary | ICD-10-CM

## 2024-09-03 DIAGNOSIS — M54.16 LUMBAR RADICULOPATHY: ICD-10-CM

## 2024-09-03 PROCEDURE — 99213 OFFICE O/P EST LOW 20 MIN: CPT | Mod: 95 | Performed by: FAMILY MEDICINE

## 2024-09-03 NOTE — PROGRESS NOTES
Dax is a 33 year old who is being evaluated via a billable video visit.    How would you like to obtain your AVS? MyChart  If the video visit is dropped, the invitation should be resent by: Text to cell phone: [phone]  Will anyone else be joining your video visit? No      Assessment & Plan     Chronic pain of both feet  Status post nondisplaced fracture of the first metatarsal bone, healing well.  He had seen podiatry, now he is off boot.    Lumbar radiculopathy    Patient has seen pain clinic, [facility] pain [facility], currently, receiving pain medications thur their office.    He reports recently had an MRI done.  May be scheduled to have cortisone injection, and a spinal cord stimulator.    History of generalize anxiety disorder he is scheduled to follow-up with a psychiatry, October 22, 2024      Subjective   Dax is a 33 year old, presenting for the following health issues:  Results        9/3/2024     4:08 PM   Additional Questions   Roomed by Shemar JAMA MA     Video Start Time: 5:40 PM    HPI     Discuss MRI results.   Discuss injection patient is getting at pain clinic. Lumbar sympathetic nerve block this upcoming Thursday.         Review of Systems  Constitutional, HEENT, cardiovascular, pulmonary, GI, , musculoskeletal, neuro, skin, endocrine and psych systems are negative, except as otherwise noted.      Objective           Vitals:  No vitals were obtained today due to virtual visit.    Physical Exam   GENERAL: alert and no distress  EYES: Eyes grossly normal to inspection.  No discharge or erythema, or obvious scleral/conjunctival abnormalities.  RESP: No audible wheeze, cough, or visible cyanosis.    SKIN: Visible skin clear. No significant rash, abnormal pigmentation or lesions.  NEURO: Cranial nerves grossly intact.  Mentation and speech appropriate for age.  PSYCH: Appropriate affect, tone, and pace of words    No orders of the defined types were placed in this encounter.         Video-Visit  Details    Type of service:  Video Visit   Video End Time:5:52 PM  Originating Location (pt. Location): Home    Distant Location (provider location):  On-site  Platform used for Video Visit: La  Signed Electronically by: Kole Gomez MD

## 2024-09-05 NOTE — TELEPHONE ENCOUNTER
[employer] is calling to follow up on form     SEBASTIAN seen it has not been completed, located form pulled from SEBASTIAN flores stuff put back in  folder as I seen there was still information missing that needed to be completed.

## 2024-09-12 NOTE — TELEPHONE ENCOUNTER
Received form back missing information     Placed back in providers sign me folder       Azalea Bell    [facility]

## 2024-10-01 ENCOUNTER — TRANSFERRED RECORDS (OUTPATIENT)
Dept: HEALTH INFORMATION MANAGEMENT | Facility: CLINIC | Age: 34
End: 2024-10-01

## 2024-10-03 NOTE — TELEPHONE ENCOUNTER
Angela from [facility] is calling to get this form refaxed to them.  They are needing a correction on form done and then fax back to her.  I asked that a new form be faxed to me.  I gave Angela [employer] fax number.  Angela is faxing form right now.    SEBASTIAN Hernandez  [facility]

## 2024-10-03 NOTE — TELEPHONE ENCOUNTER
Pulled form from 9/17 and made corrections and re faxed to [employer]    Brow Lift Text: A midfrontal incision was made medially to the defect to allow access to the tissues just superior to the left eyebrow. Following careful dissection inferiorly in a supraperiosteal plane to the level of the left eyebrow, several 3-0 monocryl sutures were used to resuspend the eyebrow orbicularis oculi muscular unit to the superior frontal bone periosteum. This resulted in an appropriate reapproximation of static eyebrow symmetry and correction of the left brow ptosis.

## 2024-10-10 ENCOUNTER — OFFICE VISIT (OUTPATIENT)
Dept: PODIATRY | Facility: CLINIC | Age: 34
End: 2024-10-10
Attending: PODIATRIST
Payer: MEDICARE

## 2024-10-10 VITALS — BODY MASS INDEX: 31.64 KG/M2 | WEIGHT: 202 LBS | OXYGEN SATURATION: 98 % | HEART RATE: 97 BPM

## 2024-10-10 DIAGNOSIS — M21.6X9 ACQUIRED CAVUS FOOT DEFORMITY: Primary | ICD-10-CM

## 2024-10-10 DIAGNOSIS — M62.40 CONTRACTED, TENDON: ICD-10-CM

## 2024-10-10 PROCEDURE — 99213 OFFICE O/P EST LOW 20 MIN: CPT | Performed by: PODIATRIST

## 2024-10-10 ASSESSMENT — PAIN SCALES - GENERAL: PAINLEVEL: SEVERE PAIN (6)

## 2024-10-10 NOTE — Clinical Note
10/10/2024      Dax Villareal  [address]  [place] [address]      Dear Colleague,    Thank you for referring your patient, Dax Villareal, to the [facility]. Please see a copy of my visit note below.    No notes on file    Again, thank you for allowing me to participate in the care of your patient.        Sincerely,        Demian Miranda DPM

## 2024-10-10 NOTE — PATIENT INSTRUCTIONS
Medardo Woods M.D.    [facility]  [address]  [place], [place] [address]    Phone: [phone] Fax: [phone]

## 2024-10-10 NOTE — PROGRESS NOTES
FOOT AND ANKLE SURGERY/PODIATRY CONSULT NOTE         ASSESSMENT:   Cavus foot deformity  A contracted Achilles tendon      TREATMENT:  I informed the patient that he would require extensive surgical procedures in an attempt to repair his severe cavus foot deformity as well as his contracted Achilles tendon.  I told the patient he would probably require a Albin procedure as well as a PAUL.  He may even require arthrodesis of the digits 2 through 5 bilaterally.  He will also require a Steindler stripping to release the contractures of the plantar muscles.  The patient is being referred to Dr. Medardo Woods for evaluation and possible treatment.        HPI: I was asked to see Dax Villareal today to evaluate and treat right foot pain.  The patient indicated that he has severe pain involving both feet.  He has had cavus foot his entire life.  He participated in all activities.  He has played most major sports as a young person.  He stated that over the years he is finding it more difficult to weight-bear and ambulate without severe pain.  He has not had any associated redness or swelling.  He has minimal pain while resting.  He has never had any direct trauma to his feet.  He denies any other previous treatment.  The patient was seen in consultation at the request of Dev Hill D.P.M. for evaluation and treatment of cavus foot deformity right foot.       Past Medical History:   Diagnosis Date    ADHD 2008    Alcohol abuse, in remission     Alcoholic cirrhosis of liver with ascites (H)     Anemia in chronic kidney disease     Chronic pain     Current smoker     End stage renal disease (H)     Hypotension, unspecified hypotension type     Malnutrition (H)     Metabolic acidosis     Necrotizing pancreatitis     Pancreatic insufficiency     Pericarditis     Recurrent pleural effusion        Social History     Socioeconomic History    Marital status: Single     Spouse name: Not on file    Number of children: Not on file  "   Years of education: Not on file    Highest education level: Not on file   Occupational History    Not on file   Tobacco Use    Smoking status: Some Days     Current packs/day: 0.25     Types: Cigarettes     Passive exposure: Current    Smokeless tobacco: Never    Tobacco comments:     8/24/2021 Patient did not want to discuss quitting but would like 4 mg lozenge to use during admission. Patient also accepted \"Quitting for Good\" workbook   Vaping Use    Vaping status: Never Used    Passive vaping exposure: Yes   Substance and Sexual Activity    Alcohol use: Not Currently     Comment: \"no alcohol for over 1 year ago\" per pt on 5/12/22    Drug use: No    Sexual activity: Not Currently     Partners: Female   Other Topics Concern    Not on file   Social History Narrative    Not on file     Social Determinants of Health     Financial Resource Strain: Low Risk  (11/21/2023)    Financial Resource Strain     Within the past 12 months, have you or your family members you live with been unable to get utilities (heat, electricity) when it was really needed?: No   Recent Concern: Financial Resource Strain - Medium Risk (9/1/2023)    Overall Financial Resource Strain (CARDIA)     Difficulty of Paying Living Expenses: Somewhat hard   Food Insecurity: High Risk (11/21/2023)    Food Insecurity     Within the past 12 months, did you worry that your food would run out before you got money to buy more?: Yes     Within the past 12 months, did the food you bought just not last and you didn t have money to get more?: No   Transportation Needs: High Risk (11/21/2023)    Transportation Needs     Within the past 12 months, has lack of transportation kept you from medical appointments, getting your medicines, non-medical meetings or appointments, work, or from getting things that you need?: Yes   Physical Activity: Patient Declined (9/1/2023)    Exercise Vital Sign     Days of Exercise per Week: Patient declined     Minutes of Exercise per " Session: Patient declined   Recent Concern: Physical Activity - Inactive (6/5/2023)    Exercise Vital Sign     Days of Exercise per Week: 0 days     Minutes of Exercise per Session: 0 min   Stress: Stress Concern Present (9/1/2023)    [employer] - Occupational Stress Questionnaire     Feeling of Stress : Rather much   Social Connections: Unknown (9/1/2023)    Social Connection and Isolation Panel [NHANES]     Frequency of Communication with Friends and Family: Twice a week     Frequency of Social Gatherings with Friends and Family: Patient declined     Attends [employer] Services: Patient declined     Active Member of Clubs or Organizations: No     Attends Club or Organization Meetings: Never     Marital Status: Patient declined   Interpersonal Safety: Low Risk  (1/30/2024)    Interpersonal Safety     Do you feel physically and emotionally safe where you currently live?: Yes     Within the past 12 months, have you been hit, slapped, kicked or otherwise physically hurt by someone?: No     Within the past 12 months, have you been humiliated or emotionally abused in other ways by your partner or ex-partner?: No   Housing Stability: High Risk (11/21/2023)    Housing Stability     Do you have housing? : Yes     Are you worried about losing your housing?: Yes        No Known Allergies       Current Outpatient Medications:     amphetamine-dextroamphetamine (ADDERALL) 30 MG tablet, 2 tab in Am and one tab at 2 PM, Disp: 90 tablet, Rfl: 0    amphetamine-dextroamphetamine (ADDERALL) 30 MG tablet, 2 tab in AM and one tab at 2 PM, Disp: 90 tablet, Rfl: 0    amphetamine-dextroamphetamine (ADDERALL) 30 MG tablet, 2 tab in AM and one tab at 2 PM, Disp: 90 tablet, Rfl: 0    baclofen (LIORESAL) 10 MG tablet, Take 1 tablet (10 mg) by mouth 3 times daily, Disp: 90 tablet, Rfl: 2    buprenorphine (BUTRANS) 5 MCG/HR WK patch, Place 1 patch onto the skin every 7 days., Disp: , Rfl:     ciclopirox (LOPROX)  0.77 % cream, Apply topically 2 times daily To feet and toenails., Disp: 90 g, Rfl: 5    DULoxetine (CYMBALTA) 60 MG capsule, Take 1 capsule (60 mg) by mouth daily, Disp: 90 capsule, Rfl: 0    hydrOXYzine HCl (ATARAX) 25 MG tablet, TAKE 1 TABLET(25 MG) BY MOUTH THREE TIMES DAILY AS NEEDED FOR ITCHING, Disp: 90 tablet, Rfl: 0    lidocaine (XYLOCAINE) 5 % external ointment, Apply topically as needed for moderate pain (qid as needed) Apply to feet and knees qid for 10 days, then as needed, Disp: 240 g, Rfl: 0    lipase-protease-amylase (CREON 24) [id number] units CPEP per EC capsule, 1-2 capsules by Per Feeding Tube route every 4 hours, Disp: 360 capsule, Rfl: 1    midodrine (PROAMATINE) 10 MG tablet, One tab before Dialysis, Disp: 30 tablet, Rfl: 0    nortriptyline (PAMELOR) 25 MG capsule, Take 25 mg by mouth at bedtime. PRN, Disp: , Rfl:     oxyCODONE (ROXICODONE) 5 MG tablet, Take 1 tablet (5 mg) by mouth every 6 hours as needed for pain, Disp: 6 tablet, Rfl: 0    pantoprazole (PROTONIX) 40 MG EC tablet, Take 1 tablet (40 mg) by mouth daily, Disp: 90 tablet, Rfl: 2    pregabalin (LYRICA) 300 MG capsule, TAKE 1 CAPSULE BY MOUTH TWICE DAILY, Disp: 180 capsule, Rfl: 0    prochlorperazine (COMPAZINE) 5 MG tablet, Take 1 tablet (5 mg) by mouth every 8 hours as needed for nausea or vomiting, Disp: 90 tablet, Rfl: 3    propranolol (INDERAL) 10 MG tablet, Take 1 tablet (10 mg) by mouth 3 times daily as needed (as needed), Disp: 90 tablet, Rfl: 0    sevelamer carbonate, RENVELA, 0.8 GM PACK Packet, 2 packets (1.6 g) by Oral or Feeding Tube route 3 times daily, Disp: , Rfl:     silver nitrate (ARZOL) 75-25 % miscellaneous, Apply topically daily as needed for wound care, Disp: , Rfl:     torsemide (DEMADEX) 20 MG tablet, 2 tab daily, Disp: 180 tablet, Rfl: 3    naloxone (NARCAN) 4 MG/0.1ML nasal spray, Spray 1 spray (4 mg) into one nostril alternating nostrils as needed for opioid reversal every 2-3 minutes until  "assistance arrives, Disp: 0.2 mL, Rfl: 0    sodium bicarbonate 325 MG tablet, 1 tablet (325 mg) by Per Feeding Tube route every 4 hours (Patient not taking: Reported on 7/20/2023), Disp: , Rfl:      Family History   Problem Relation Age of Onset    Attention Deficit Disorder Mother     Alcoholism Mother     Cancer Mother     Alcoholism Father     Alcoholism Sister     Alcoholism Brother         Social History     Socioeconomic History    Marital status: Single     Spouse name: Not on file    Number of children: Not on file    Years of education: Not on file    Highest education level: Not on file   Occupational History    Not on file   Tobacco Use    Smoking status: Some Days     Current packs/day: 0.25     Types: Cigarettes     Passive exposure: Current    Smokeless tobacco: Never    Tobacco comments:     8/24/2021 Patient did not want to discuss quitting but would like 4 mg lozenge to use during admission. Patient also accepted \"Quitting for Good\" workbook   Vaping Use    Vaping status: Never Used    Passive vaping exposure: Yes   Substance and Sexual Activity    Alcohol use: Not Currently     Comment: \"no alcohol for over 1 year ago\" per pt on 5/12/22    Drug use: No    Sexual activity: Not Currently     Partners: Female   Other Topics Concern    Not on file   Social History Narrative    Not on file     Social Determinants of Health     Financial Resource Strain: Low Risk  (11/21/2023)    Financial Resource Strain     Within the past 12 months, have you or your family members you live with been unable to get utilities (heat, electricity) when it was really needed?: No   Recent Concern: Financial Resource Strain - Medium Risk (9/1/2023)    Overall Financial Resource Strain (CARDIA)     Difficulty of Paying Living Expenses: Somewhat hard   Food Insecurity: High Risk (11/21/2023)    Food Insecurity     Within the past 12 months, did you worry that your food would run out before you got money to buy more?: Yes     " Within the past 12 months, did the food you bought just not last and you didn t have money to get more?: No   Transportation Needs: High Risk (11/21/2023)    Transportation Needs     Within the past 12 months, has lack of transportation kept you from medical appointments, getting your medicines, non-medical meetings or appointments, work, or from getting things that you need?: Yes   Physical Activity: Patient Declined (9/1/2023)    Exercise Vital Sign     Days of Exercise per Week: Patient declined     Minutes of Exercise per Session: Patient declined   Recent Concern: Physical Activity - Inactive (6/5/2023)    Exercise Vital Sign     Days of Exercise per Week: 0 days     Minutes of Exercise per Session: 0 min   Stress: Stress Concern Present (9/1/2023)    [employer] - Occupational Stress Questionnaire     Feeling of Stress : Rather much   Social Connections: Unknown (9/1/2023)    Social Connection and Isolation Panel [NHANES]     Frequency of Communication with Friends and Family: Twice a week     Frequency of Social Gatherings with Friends and Family: Patient declined     Attends [employer] Services: Patient declined     Active Member of Clubs or Organizations: No     Attends Club or Organization Meetings: Never     Marital Status: Patient declined   Interpersonal Safety: Low Risk  (1/30/2024)    Interpersonal Safety     Do you feel physically and emotionally safe where you currently live?: Yes     Within the past 12 months, have you been hit, slapped, kicked or otherwise physically hurt by someone?: No     Within the past 12 months, have you been humiliated or emotionally abused in other ways by your partner or ex-partner?: No   Housing Stability: High Risk (11/21/2023)    Housing Stability     Do you have housing? : Yes     Are you worried about losing your housing?: Yes        Review of Systems - Patient denies fever, chills, rash, wound,  numbness, weakness, heart burn, blood in  stool, chest pain with activity, calf pain when walking, shortness of breath with activity, chronic cough, easy bleeding/bruising, swelling of ankles, excessive thirst, fatigue, depression, anxiety.  Patient admits to bilateral foot pain.      OBJECTIVE:  Appearance: alert, well appearing, and in no distress.    Pulse 97   Wt 91.6 kg (202 lb)   SpO2 98%   BMI 31.64 kg/m       Body mass index is 31.64 kg/m .     General appearance: Patient is alert and fully cooperative with history & exam.  No sign of distress is noted during the visit.  Psychiatric: Affect is pleasant & appropriate.  Patient appears motivated to improve health.  Respiratory: Breathing is regular & unlabored while sitting.  HEENT: Hearing is intact to spoken word.  Speech is clear.  No gross evidence of visual impairment that would impact ambulation.    Vascular: Dorsalis pedis and posterior tibial pulses are palpable. There is pedal hair growth bilaterally.  CFT < 3 sec from anterior tibial surface to distal digits bilaterally. There is no appreciable edema noted.  Dermatologic: Turgor and texture are within normal limits. No coloration or temperature changes. No primary or secondary lesions noted.  Neurologic: All epicritic and proprioceptive sensations are grossly intact bilaterally.  Musculoskeletal: All active and passive ankle, subtalar, midtarsal, and 1st MPJ range of motion are grossly intact. Manual muscle strength is within normal limits bilaterally. All dorsiflexors, plantarflexors, invertors, evertors are intact bilaterally. Tenderness present to bilateral feet on palpation.  Tenderness to bilateral feet with range of motion. Calf is soft/non-tender without warmth/induration    Imaging:       No images are attached to the encounter or orders placed in the encounter.     No results found.   No results found.     Demian Miranda; PORTER  [facility]Podiatry

## 2024-10-11 ENCOUNTER — PATIENT OUTREACH (OUTPATIENT)
Dept: CARE COORDINATION | Facility: CLINIC | Age: 34
End: 2024-10-11
Payer: MEDICARE

## 2024-10-14 ENCOUNTER — PATIENT OUTREACH (OUTPATIENT)
Dept: CARE COORDINATION | Facility: CLINIC | Age: 34
End: 2024-10-14
Payer: MEDICARE

## 2024-10-16 ENCOUNTER — TELEPHONE (OUTPATIENT)
Dept: PODIATRY | Facility: CLINIC | Age: 34
End: 2024-10-16
Payer: MEDICARE

## 2024-10-16 NOTE — TELEPHONE ENCOUNTER
Faxed most recent office visit note to [facility] per patient request.     Called patient to inform. [place] about the [place], and the delay of all his care in regards to all of his health problems. Patient states he's going to reach out to his care coordinator, and let us know if anything is needed from a podiatry perspective. He was appreciative of call.

## 2024-10-16 NOTE — TELEPHONE ENCOUNTER
Caller: Dax    Provider: PORTER Miranda    Detailed reason for call: Patient is asking for an update/office visit notes/whatever we feel is appropriate in order to update his pain clinic on Dr. Aguilera's recommendations. No call back needed.     Fax to [facility] in [place]: [phone]

## 2024-10-22 ENCOUNTER — VIRTUAL VISIT (OUTPATIENT)
Dept: PSYCHIATRY | Facility: CLINIC | Age: 34
End: 2024-10-22
Attending: FAMILY MEDICINE
Payer: MEDICARE

## 2024-10-22 ENCOUNTER — TELEPHONE (OUTPATIENT)
Dept: PODIATRY | Facility: CLINIC | Age: 34
End: 2024-10-22
Payer: MEDICARE

## 2024-10-22 DIAGNOSIS — M21.6X9 ACQUIRED CAVUS FOOT DEFORMITY: Primary | ICD-10-CM

## 2024-10-22 DIAGNOSIS — F90.9 ATTENTION DEFICIT HYPERACTIVITY DISORDER (ADHD), UNSPECIFIED ADHD TYPE: ICD-10-CM

## 2024-10-22 DIAGNOSIS — F10.11 ALCOHOL ABUSE, IN REMISSION: Primary | ICD-10-CM

## 2024-10-22 DIAGNOSIS — F41.1 GAD (GENERALIZED ANXIETY DISORDER): ICD-10-CM

## 2024-10-22 DIAGNOSIS — F33.1 MODERATE EPISODE OF RECURRENT MAJOR DEPRESSIVE DISORDER (H): ICD-10-CM

## 2024-10-22 DIAGNOSIS — M62.40 CONTRACTED, TENDON: ICD-10-CM

## 2024-10-22 PROBLEM — F32.1 CURRENT MODERATE EPISODE OF MAJOR DEPRESSIVE DISORDER (H): Status: ACTIVE | Noted: 2024-10-22

## 2024-10-22 PROCEDURE — 99215 OFFICE O/P EST HI 40 MIN: CPT | Mod: 95 | Performed by: NURSE PRACTITIONER

## 2024-10-22 PROCEDURE — 99417 PROLNG OP E/M EACH 15 MIN: CPT | Mod: 95 | Performed by: NURSE PRACTITIONER

## 2024-10-22 ASSESSMENT — PATIENT HEALTH QUESTIONNAIRE - PHQ9
SUM OF ALL RESPONSES TO PHQ QUESTIONS 1-9: 6
SUM OF ALL RESPONSES TO PHQ QUESTIONS 1-9: 6
10. IF YOU CHECKED OFF ANY PROBLEMS, HOW DIFFICULT HAVE THESE PROBLEMS MADE IT FOR YOU TO DO YOUR WORK, TAKE CARE OF THINGS AT HOME, OR GET ALONG WITH OTHER PEOPLE: EXTREMELY DIFFICULT

## 2024-10-22 ASSESSMENT — PAIN SCALES - GENERAL: PAINLEVEL: MODERATE PAIN (5)

## 2024-10-22 NOTE — PROGRESS NOTES
Virtual Visit Details    Type of service:  Video Visit   Video/Phone Start Time:  3:05 pm  Video/Phone End Time:   3:55 pm    Originating Location (pt. Location): Home    Distant Location (provider location):  Off-site  Platform used for Video Visit: Blood cell Storage          Answers submitted by the patient for this visit:  Patient Health Questionnaire (Submitted on 10/22/2024)  If you checked off any problems, how difficult have these problems made it for you to do your work, take care of things at home, or get along with other people?: Extremely difficult  PHQ9 TOTAL SCORE: 6

## 2024-10-22 NOTE — PROGRESS NOTES
"PSYCHIATRIC DIAGNOSTIC ASSESSMENT      Name:  Dax Villareal  : 1990    Dax Villareal is a 33 year old male who is being evaluated via a billable Video visit.      Telemedicine Visit: The patient's condition can be safely assessed and treated via synchronous audio and visual telemedicine encounter.      Reason for Telemedicine Visit: COVID 19 pandemic and the social and physical recommendations by the CDC and [facility]., Patient has requested telehealth visit, and Patient unable to travel      Originating Site (Patient Location): Patient's home    Distant Site (Provider Location): [facility] Outpatient Setting: [facility]    Consent:  The patient/guardian has verbally consented to: the potential risks and benefits of telemedicine (video visit or phone) versus in person care; bill my insurance or make self-payment for services provided; and responsibility for payment of non-covered services.     Mode of Communication:  [employer] platform     As the provider I attest to compliance with applicable laws and regulations related to telemedicine.                                              CHIEF COMPLAINT     \"I need help with my anxiety and stress\"  HISTORY OF PRESENT ILLNESS     Patient is a 33 year old,  White Not  or  male with a history of end-stage renal disease, generalized anxiety disorder, ADHD, alcohol abuse, in remission, depression, unspecified depression type who presents for initial psychiatric evaluation following referral by his primary care provider,Kole Gomez MD for the medication management of ongoing psychiatric symptoms related to increased anxiety and occasional panic attacks.  Patient reports his anxiety and feeling of overwhelming have not been adequately managed with current medication which is propranolol 10 mg 3 times daily as needed for anxiety.  Patient stated he was previously prescribed duloxetine 60 mg by her previous psychiatrist which he stopped taking by " weaning himself off about 2 months ago due to poor efficacy.  Patient states he would like to take Xanax since this has been the only medication that has helped with anxiety and panic attacks in the past.  Patient reports he is currently receiving dialysis while on the wait list for kidney transplant.  Patient reports he currently takes Adderall up to 90 mg daily which he has been prescribed by her primary care provider for a long time.  Patient also reports chronic pain of both feet as well as chronic radiculopathy continue to exacerbate psychiatric symptoms.  Patient reports he uses wheelchair to ambulate.  Patient denies history of psychiatric sterilization.  Patient also denies history of suicidal ideation and attempt.  Patient endorsed history of alcohol abuse but been sober since 2021.  Patient denies history of psychosis, diana, or hypomania.  PSYCHIATRIC HISTORY:   History of Psychiatric Hospitalizations:   - Inpatient: None  - IOP/PHP/Day treatment: None  History of Suicidal Ideation: Negative  History of Suicide Attempts:  Negative    History of Self-injurious Behavior: Denies a history of SIB.  Current:  No  History of Violence/Aggression: Negative  History of Commitment? Negative  Electroconvulsive Therapy (ECT):Negative    PSYCHIATRIC REVIEW OF SYSTEMS:   Psychiatric Review of Systems:   Depression:   Denies: depressed mood, suicidal ideation, decreased interest, changes in sleep, changes in appetite, guilt, hopelessness, helplessness, impaired concentration, decreased energy, irritability.  Diana:   Denies: sleeplessness, increased goal-directed activities, abrupt increase in energy pressured speech  Psychosis:   Denies: visual hallucinations, auditory hallucinations, paranoia  Anxiety:   Reports: excessive worries that are difficult to control, panic attacks  PTSD:   Denies: re-experiencing past trauma, nightmares, increased arousal, avoidance of traumatic stimuli, impaired function.  OCD:   Denies:  obsessions, checking, symmetry, cleaning, skin picking.  Eating Disorder:   Denies: restriction, binging, purging.    Sleep:       MEDICATIONS                                                                                                Current Outpatient Medications   Medication Sig Dispense Refill    amphetamine-dextroamphetamine (ADDERALL) 30 MG tablet 2 tab in Am and one tab at 2 PM 90 tablet 0    amphetamine-dextroamphetamine (ADDERALL) 30 MG tablet 2 tab in AM and one tab at 2 PM 90 tablet 0    amphetamine-dextroamphetamine (ADDERALL) 30 MG tablet 2 tab in AM and one tab at 2 PM 90 tablet 0    baclofen (LIORESAL) 10 MG tablet Take 1 tablet (10 mg) by mouth 3 times daily 90 tablet 2    buprenorphine (BUTRANS) 5 MCG/HR WK patch Place 1 patch onto the skin every 7 days.      ciclopirox (LOPROX) 0.77 % cream Apply topically 2 times daily To feet and toenails. 90 g 5    DULoxetine (CYMBALTA) 60 MG capsule Take 1 capsule (60 mg) by mouth daily 90 capsule 0    hydrOXYzine HCl (ATARAX) 25 MG tablet TAKE 1 TABLET(25 MG) BY MOUTH THREE TIMES DAILY AS NEEDED FOR ITCHING 90 tablet 0    lidocaine (XYLOCAINE) 5 % external ointment Apply topically as needed for moderate pain (qid as needed) Apply to feet and knees qid for 10 days, then as needed 240 g 0    lipase-protease-amylase (CREON 24) 05428-72165-262415 units CPEP per EC capsule 1-2 capsules by Per Feeding Tube route every 4 hours 360 capsule 1    midodrine (PROAMATINE) 10 MG tablet One tab before Dialysis 30 tablet 0    nortriptyline (PAMELOR) 25 MG capsule Take 25 mg by mouth at bedtime. PRN      oxyCODONE (ROXICODONE) 5 MG tablet Take 1 tablet (5 mg) by mouth every 6 hours as needed for pain 6 tablet 0    pantoprazole (PROTONIX) 40 MG EC tablet Take 1 tablet (40 mg) by mouth daily 90 tablet 2    pregabalin (LYRICA) 300 MG capsule TAKE 1 CAPSULE BY MOUTH TWICE DAILY 180 capsule 0    prochlorperazine (COMPAZINE) 5 MG tablet Take 1 tablet (5 mg) by mouth every 8 hours  "as needed for nausea or vomiting 90 tablet 3    propranolol (INDERAL) 10 MG tablet Take 1 tablet (10 mg) by mouth 3 times daily as needed (as needed) 90 tablet 0    sevelamer carbonate, RENVELA, 0.8 GM PACK Packet 2 packets (1.6 g) by Oral or Feeding Tube route 3 times daily      silver nitrate (ARZOL) 75-25 % miscellaneous Apply topically daily as needed for wound care      torsemide (DEMADEX) 20 MG tablet 2 tab daily 180 tablet 3     No current facility-administered medications for this visit.       DRUG MONITORING:  [place] Prescription Monitoring Program evaluating controlled substances in the last year in [place]:  The [place] Prescription Monitoring Program has been reviewed and there are no current concerns with: diversionary activity, early refill requests, and or obtaining the medication from multiple providers       PAST PSYCHOTROPIC MEDICATIONS:  Adderall, Ritalin, Duloxetine, Hydroxyzine    VITALS   There were no vitals taken for this visit.     BP Readings from Last 1 Encounters:   07/23/24 109/75     Pulse Readings from Last 1 Encounters:   10/10/24 97     Wt Readings from Last 1 Encounters:   10/10/24 91.6 kg (202 lb)     Ht Readings from Last 1 Encounters:   04/18/24 1.702 m (5' 7\")     Estimated body mass index is 31.64 kg/m  as calculated from the following:    Height as of 4/18/24: 1.702 m (5' 7\").    Weight as of 10/10/24: 91.6 kg (202 lb).      PERTINENT HISTORY   PAST MEDICAL HISTORY:   Past Medical History:   Diagnosis Date    ADHD 2008    Alcohol abuse, in remission     Alcoholic cirrhosis of liver with ascites (H)     Anemia in chronic kidney disease     Chronic pain     Current smoker     End stage renal disease (H)     Hypotension, unspecified hypotension type     Malnutrition (H)     Metabolic acidosis     Necrotizing pancreatitis     Pancreatic insufficiency     Pericarditis     Recurrent pleural effusion        PAST SURGICAL HISTORY:   Past Surgical History:   Procedure Laterality Date "    AV FISTULA PLACEMENT, BRACHIOCEPHALIC Left 05/11/2022    [facility]    COMBINED ESOPHAGOSCOPY, GASTROSCOPY, DUODENOSCOPY (EGD), TISSUE APPOSI N/A 09/20/2021    Procedure: ESOPHAGOGASTRODUODENOSCOPY WITH SUTURE FISTULA CLOSURE, argon plasma coagulation;  Surgeon: Jose Quigley MD;  Location: [facility] OR    ENDOSCOPIC INSERTION TUBE GASTROSTOMY N/A 07/13/2021    Procedure: Upper endoscopy, INSERTION, PEG-J TUBE and Gastropexy;  Surgeon: Rayshawn Will MD;  Location: [facility] OR    ENDOSCOPIC INSERTION TUBE GASTROSTOMY  09/13/2021    Procedure: PEG/J PLACEMENT.;  Surgeon: Rayshawn Will MD;  Location: [facility]    ENDOSCOPIC ULTRASOUND UPPER GASTROINTESTINAL TRACT (GI) N/A 07/21/2021    Procedure: ENDOSCOPIC ULTRASOUND, ESOPHAGOSCOPY / UPPER GASTROINTESTINAL TRACT (GI) with cyst gastrostomy, dilation;  Surgeon: Guru Yecenia Chacko MD;  Location: [facility]    ENDOSCOPIC ULTRASOUND, ESOPHAGOSCOPY, GASTROSCOPY, DUODENOSCOPY (EGD), NECROSECTOMY N/A 08/11/2021    Procedure: ESOPHAGOGASTRODUODENOSCOPY (EGD) with necrosectomy, stent exchange.;  Surgeon: Amadou Beasley MD;  Location: [facility]    ESOPHAGOSCOPY, GASTROSCOPY, DUODENOSCOPY (EGD), COMBINED N/A 07/28/2021    Procedure: ESOPHAGOGASTRODUODENOSCOPY (EGD), gastrostomy apposition, Necrosectomy, stent exchange.;  Surgeon: Rayshawn Will MD;  Location: [facility]    ESOPHAGOSCOPY, GASTROSCOPY, DUODENOSCOPY (EGD), COMBINED N/A 08/06/2021    Procedure: ESOPHAGOGASTRODUODENOSCOPY (EGD) with necrosectomy, and stents exchanged;  Surgeon: Jose Quigley MD;  Location: [facility]    ESOPHAGOSCOPY, GASTROSCOPY, DUODENOSCOPY (EGD), COMBINED Left 09/13/2021    Procedure: ESOPHAGOGASTRODUODENOSCOPY (EGD), GASTROSTOMY TUBE REMOVAL, FISTULAR CLOSURE ENDOSCOPIC.;  Surgeon: Rayshawn Will MD;  Location: [facility]    IR ABSCESS TUBE CHANGE  07/14/2021    IR NG TUBE PLACEMENT REQ RAD & FLUORO  10/26/2021    IR PARACENTESIS  07/21/2021    IR SINOGRAM INJECTION  "DIAGNOSTIC  08/25/2021    IR THORACENTESIS  02/05/2022    ORTHOPEDIC SURGERY Right     fracture repair    REPLACE GASTROSTOMY TUBE, PERCUTANEOUS N/A 07/28/2021    Procedure: gastro-jejunostomy tube exchange;  Surgeon: Rayshawn Will MD;  Location:  OR       FAMILY HISTORY:   Family History   Problem Relation Age of Onset    Attention Deficit Disorder Mother     Alcoholism Mother     Cancer Mother     Alcoholism Father     Alcoholism Sister     Alcoholism Brother        SOCIAL HISTORY:   Social History     Tobacco Use    Smoking status: Every Day     Current packs/day: 0.25     Types: Cigarettes     Passive exposure: Current    Smokeless tobacco: Never    Tobacco comments:     10/22/24 more or less around half a pack of cigs/day.          8/24/2021 Patient did not want to discuss quitting but would like 4 mg lozenge to use during admission. Patient also accepted \"Quitting for Good\" workbook   Substance Use Topics    Alcohol use: Not Currently     Comment: \"no alcohol for over 1 year ago\" per pt on 5/12/22         Seizures or Head Injury: Denies history of head injury. Denies history of seizures.  History of cardiac disease, rheumatic fever, fainting or dizziness, especially with exercise, seizures, chest pain or shortness of breath with exercise, unexplained change in exercise tolerance, palpitations, high blood pressure, or heart murmur?   No    LABS & IMAGING                                                                                                                Personally reviewed  Recent Labs   Lab Test 02/26/24  1226 08/10/21  0631 08/09/21  1312   WBC 6.1   < > 15.1*   HGB 13.0*   < > 8.3*   HCT 38.3*   < > 24.8*   MCV 88   < > 90      < > 290   ANEU  --   --  11.6*    < > = values in this interval not displayed.     Recent Labs   Lab Test 02/26/24  1226 10/19/23  1328 05/22/23  0430 05/22/23  0353    138   < > 134*   POTASSIUM 3.6 3.1*   < > 3.9   CHLORIDE 104 93*  --  98   CO2 23 " "32*  --  21*   GLC 99 160*   < > 98   JANENE 9.3 10.5*  --  10.0   MAG  --   --   --  2.4*   BUN 32.9* 25.1*  --  24.3*   CR 2.68* 3.10*  --  3.11*   GFRESTIMATED 31* 26*  --  26*   ALBUMIN  --  5.2  --  4.7   PROTTOTAL  --  8.9*  --  7.7   AST  --  26  --  30   ALT  --  29  --  31   ALKPHOS  --  121  --  117   BILITOTAL  --  0.7  --  0.7    < > = values in this interval not displayed.     Recent Labs   Lab Test 01/30/24  1537 06/28/22  0839   CHOL 260* 162   * 86   HDL 38* 62   TRIG 211* 70   A1C  --  4.6     Recent Labs   Lab Test 06/28/22  0839 08/19/21  0647   TSH 1.59 5.48*   T4  --  1.18     No results found for: \"QHO944\", \"WKGW686\", \"FYQE31VUGNI\", \"VITD3\", \"D2VIT\", \"D3VIT\", \"DTOT\", \"GE05502894\", \"[id number]\", \"[id number]\", \"[id number]\", \"[id number]\", \"[id number]\"     ALLERGY & IMMUNIZATIONS     No Known Allergies    FAMILY MEDICAL HISTORY:     Family History       Problem (# of Occurrences) Relation (Name,Age of Onset)    Cancer (1) Mother    Attention Deficit Disorder (1) Mother    Alcoholism (4) Mother, Father, Sister, Brother              Family history of sudden or unexplained death or an event requiring resuscitation in children or young adults, cardiac arrhythmias (eg, Jo-Parkinson-White syndrome), long QT syndrome, catecholaminergic paroxysmal ventricular tachycardia, Brugada syndrome, arrhythmogenic right ventricular dysplasia, hypertrophic cardiomyopathy, dilated cardiomyopathy, or Marfan syndrome?  No    FAMILY PSYCHIATRIC HISTORY:   Psychiatry:Mother with Bipolar, brother with depression  Substance use history in family: Alcoholism runs in the family  Family suicide history:Brother committed suicide in 2012      SIGNIFICANT SOCIAL/FAMILY HISTORY:                                           Born and raised in: [place]  Relationship status: Single, living by himself in an apartment  Children: None    Highest education level was:Some college   Service:Denies  Employment status: on " "[employer]  LEGAL: Denies     SUBSTANCE USE HISTORY      Drug/treatment history and current pattern of use:   Drug of choice: Alcohol   Last alcohol use : 2021  History of alcohol abuse and history of cirrhosis and pancreatis and kidney failure   Nicotine -1/2 pack a day   Cannabis- Denies   Denies use of all other mood altering substances     MEDICAL REVIEW OF SYSTEMS:   Ten system review was completed with pertinent positives noted above    MENTAL STATUS EXAM:   Mental Status Examination (limited due to video virtual visit format):  Vital Signs: There were no vitals taken for this virtual visit.  Appearance: adequately groomed, appears stated age, and in no apparent distress.  Attitude: cooperative   Eye Contact: good to the extent that can be determined in a video visit  Muscle Strength and Tone: no gross abnormalities based on remote observation  Psychomotor Behavior:  no evidence of tardive dyskinesia, dystonia, or tics based on remote observation  Gait and Station: not assessed, on wheelchair  Speech: clear, coherent, normal prosody, regular rate, regular rhythm and fluent  Associations: No loosening of associations  Thought Process: coherent and goal directed  Thought Content: no evidence of suicidal ideation or homicidal ideation, no evidence of psychotic thought, no auditory hallucinations present and no visual hallucinations present  Mood: \"stressed out\"  Affect: appropriate and in normal range  Insight: good  Judgment: intact, adequate for safety  Impulse Control: intact  Oriented to: time, place, person and situation  Attention Span and Concentration: normal  Language: Intact  Recent and Remote Memory: intact to interview. Not formally assessed. No amnesia.  Fund of Knowledge: appropriate        SAFETY   Feels safe in home: Yes   Suicidal ideation: Denies  History of suicide attempts:  No   Hx of impulsivity: No     DSM 5 DIAGNOSIS:   1. DIMITRI (generalized anxiety disorder)    2. Alcohol abuse, in " remission    3. Attention deficit hyperactivity disorder (ADHD), unspecified ADHD type    4. Moderate episode of recurrent major depressive disorder (H)    ASSESSMENT AND PLAN    Patient is a 33 year old,  White Not  or  male with a history of end-stage renal disease, generalized anxiety disorder, ADHD, alcohol abuse, in remission, depression, unspecified depression type who presents for initial psychiatric evaluation following referral by his primary care provider,Kole Gomez MD for the medication management of ongoing psychiatric symptoms related to increased anxiety and occasional panic attacks.  Patient who is currently on a the wait list for kidney transplant who started dialysis in 2021 secondary to ESRD would like to establish care by seeking Xanax for the management of increased anxiety and occasional panic attacks as current medication regimen has not been effectively managing psychiatric symptoms.  He has seen Herminia Valdivia NP until  May 2024 who prescribed duloxetine 60 mg as well as hydroxyzine as needed for anxiety and depression.  He had weaned himself off duloxetine about 2 months ago due to poor efficacy.  His PCP recently prescribed propranolol 10 mg 3 times daily as needed for anxiety and panic attack.  He believes propranolol somewhat  helps with anxiety and has reduced frequency of panic attacks but still believes taking Xanax will further help with his residual  anxiety and panic attacks.  He is currently taking Adderall total daily dose of 90 mg which his PCP prescribes.  I spent extensive period of time educating patient about adverse effects of long-term use of Xanax as this medication will cause rebound anxiety, dependency issue, and cognitive problem.  I suggested using none benzodiazepine and order antidepressants as an alternative to helping with ongoing depressive symptoms and anxiety.  I suggested starting patient on Prozac 20 mg daily to effectively target increased  anxiety as well as residual depression.  I discussed medication side effect, risk, and benefit with the patient.  Patient verbalized good understanding and was amenable to taking Prozac to help with anxiety and depression.  I encouraged patient to keep taking propranolol 10 mg 3 times daily to further help with anxiety and panic attack.  I made patient aware that Adderall 90 mg daily is too much as this dose will make anxiety, appetite, and sleep worse.  Patient stated he would like to remain on Adderall 90 mg and prefers his primary to continue managing this medication.  Patient currently denying both suicidal homicidal ideation.  He also denied both auditory and visual hallucination.  Patient report no saba or hypomania.  Patient return to clinic in 4 weeks for follow-up.    Plan:  1.Patient will take the medications as prescribed.   Medications: Take Prozac 20 mg daily for anxiety and depression  Continue Propranolol 10 mg three times daily as needed for anxiety and panic attacks  Patient also take Adderall 90 mg total daily dose- Per PCP. Patient is aware I will not prescribe this high dose  Patient will not stop taking medications or adjust them without consulting with the provider.  2.Patient will call with any problems between visits.  3.Patient will go to the emergency room if not feeling safe , unable to function in the community, or if suicidal, homicidal or hearing voices or having paranoia.  4.Patient will abstain from drugs and alcohol./Pt denies use .  5.Patient will not drive if sedated on medications or under influence of any substance. He does not drive.   6.Patient will not mix psychiatric medications with drugs and alcohol.   7.Patient will watch his diet and exercise.  8.Patient will see non psychiatric providers for non psychiatric disorders.  9. Next appointment in one  month     Risk Assessment:     Dax has notable risk factors for self-harm, including, single status, anxiety, pain,  and  passive SI. However, risk is mitigated by ability to volunteer a safety plan and history of seeking help when needed. Additional steps taken to minimize risk include making medication adjustment, asking patient to call 911 and go to the ER if not able to stay safe at home,  Therefore, based on all available evidence including the factors cited above, Dax does not appear to be an imminent danger to self or others and does not meet criteria 72 hour hold. However, if patient uses substances or is non-adherent with medication, their risk of decompensation and SI/HI will be elevated. This was discussed with the patient as she verbalized good understanding.   CONSULTS/REFERRALS:   Continue therapy  None at this time  Coordinate care with therapist as needed    MEDICAL:   None at this time  Coordinate care with PCP (Kole Gomez) as needed  Follow up with primary care provider as planned or for acute medical concerns.    PSYCHOEDUCATION:  Medication side effects and alternatives reviewed. Health promotion activities recommended and reviewed today. All questions addressed. Education and counseling completed regarding risks and benefits of medications and psychotherapy options.  Consent provided by patient/guardian  Call the psychiatric nurse line with medication questions or concerns at [phone].  [employer] may be used to communicate with your provider, but this is not intended to be used for emergencies.  BLACK BOX WARNING: Discussed the Food and Drug Administration (FDA) requires that all antidepressants carry a warning that some children, adolescents and young adults may be at increased risk of suicide when taking antidepressants. Anyone taking an antidepressant should be watched closely for worsening depression or unusual behavior especially in the first few weeks after starting an SSRI. Keep in mind, antidepressants are more likely to reduce suicide risk in the long run by improving mood.   SEROTONIN SYNDROME:   Discussed risks of Serotonin syndrome (ie, serotonin toxicity) which is a potentially life-threatening condition associated with increased serotonergic activity in the central nervous system (CNS). It is seen with therapeutic medication use, inadvertent interactions between drugs, and intentional self-poisoning. Serotonin syndrome may involve a spectrum of clinical findings, which often include mental status changes, autonomic hyperactivity, and neuromuscular abnormalities.    BENZODIAZEPINE:  discussion on how benzos work and the need to use them short term due to potential of anxiety getting.  This is a controlled substance with risk for abuse, need to keep in a safe keep place and cannot replace lost scripts.    HYPNOTIC USE: Hypnotic use, risk for CNS depression, sleep-walking, not to mix with ETOH or other CNS depressant, need for six hours of sleep, stop if change in mood.  This is a controlled substance with risk for abuse, need to keep in a safe keep place and cannot replace lost scripts.  FIRST GENERATION ANTIPSYCHOTIC/ SECOND GENERATION ANTIPSYCHOTIC USE:  Atypical need for cardiometabolic monitoring with medication- B/P, weight, blood sugar, cholesterol.  Need to monitor for abnormal movements taught  SAFETY:  We all care about your loved one's safety. To reduce the risk of self-harm, remove access to all:  Firearms, Medicines (both prescribed and over-the-counter), Knives and other sharp objects, Ropes and like materials, and Alcohol  SLEEP HYGIENE: establish a sleep routine, limit screen time 1 hour prior to bed, use bed for sleep only, take sleep/medications on time (including sleepy time tea, trazadone or herbal treatments such as melatonin), aroma therapy, limit caffeine/sugar, yoga, guided imagery, stretch, meditation, limit naps to 20 minutes, make a temperature change in the room, white noise, be mindful of slowing down breathing, take a warm bath/shower, frequently wash sheets, and journaling.    Medlineplus.gov is information for patients.  It is run by the [employer] and it contains information about all disorders, diseases and all medications.      COMMUNITY RESOURCES:    CRISIS NUMBERS: Provided in AVS 10/22/2024  National Suicide Prevention Lifeline: 6-353-819-TALK (325-989-3382)  Firm58/resources for a list of additional resources (SOS)            [facility] - [phone]   Urgent Care Adult Mental [facility]-[phone] mobile unit/  crisis line  [facility] -[phone]   COPE  [facility] Mobile Team -[phone] (adults)/ [phone] (child)  Poison Control Center - [phone]    OR  go to nearest ER  Crisis Text Line for any crisis  send this-   To: [id number]   [facility] ([facility]) [facility]  [phone]  OTHER  National Suicide Prevention Lifeline: 277.281.8545 (TTY: 562.478.3145). Call anytime for help.  (www.suicidepreventionlifeline.org)  National [place] on Mental Illness (www.baljinder.org): 222.102.1382 or 513-236-9283.   Mental Health Association (www.mentalhealth.org): 544.527.1691 or 807-611-7138.  [place] Crisis Text Line: Text [place] to 310359  Suicide LifeLine Chat: suicidePeopleLinxline.org/chat    ADMINISTRATIVE BILLIN min spent interviewing patient, reviewing referral documents, obtaining and reviewing outside records, communication with other health specialists, and preparing this report on this day:10/22/24     Video/Phone Start Time:  3:05 pm  Video/Phone End Time:   3:55 pm    Greater than 50% of time was spent in counseling and coordination of care regarding above diagnoses and treatment plan.    Signed:   Gianluca Her, MSN, APRN, PMHNP-BC  Long Term Outpatient Psychiatry  Chart documentation done in part with Dragon Voice Recognition software.  Although reviewed after completion, some word and grammatical errors may remain.   Answers submitted by the patient for this  visit:  Patient Health Questionnaire (Submitted on 10/22/2024)  If you checked off any problems, how difficult have these problems made it for you to do your work, take care of things at home, or get along with other people?: Extremely difficult  PHQ9 TOTAL SCORE: 6

## 2024-10-22 NOTE — NURSING NOTE
Is the patient currently in the state of [place]? YES    Current patient location: [address]   [place] [address]    Visit mode:VIDEO    If the visit is dropped, the patient can be reconnected by: VIDEO VISIT: Text to cell phone:   Telephone Information:   Mobile [phone]       Will anyone else be joining the visit? No  (If patient encounters technical issues they should call [phone])    Are changes needed to the allergy or medication list? Pt stated no changes to allergies and Pt stated no med changes    Are refills needed on medications prescribed by this physician? No    Rooming Documentation: Attendance Guidelines - Care team has reviewed attendance agreement with patient. Patient advised that two failed appointments within 6 months may lead to termination of current episode of care.     Unable to complete qnrs due to time.     Reason for visit: Consult     PAZ Santos

## 2024-10-22 NOTE — PATIENT INSTRUCTIONS
"Patient Education   The [facility]  What to Expect  Here's what to expect in the Panel Psychiatry Program.   About the program  You'll be meeting with a psychiatric doctor to check your mental health. A psychiatric doctor helps you deal with troubling thoughts and feelings by giving you medicine. They'll make sure you know the plan for your care. You may see them for a long time. When you're feeling better, they may refer you back to seeing your family doctor.   If you have any questions, we'll be glad to talk to you.  About visits  Be open  At your visits, please talk openly about your problems. It may feel hard, but it's the best way for us to help you.  Cancelling visits  If you can't come to your visit, please call us right away at [phone]. If you don't cancel at least 24 hours (1 full day) before your visit, that's \"late cancellation.\"  Not showing up for your visits  Being very late is the same as not showing up. You'll be a \"no show\" if:  You're more than 15 minutes late for a 30-minute (half hour) visit.  You're more than 30 minutes late for a 60-minute (full hour) visit.  If you cancel late or don't show up 2 times within 6 months, we may end your care.  Getting help between visits  If you need help between visits, you can call us Monday to Friday from 8 a.m. to 4:30 p.m. at [phone].  Emergency care  Call 911 or go to the nearest emergency department if your life or someone else's life is in danger.  Call 988 anytime to reach the national Suicide and Crisis hotline.  Medicine refills  To refill your medicine, call your pharmacy. You can also call [facility]'s [facility] at [phone], Monday to Friday, 8 a.m. to 4:30 p.m. It can take 1 to 3 business days to get a refill.   Forms, letters, and tests  You may have papers to fill out, like FMLA, short-term disability, and workability. We can help you with these forms at your visits, but you must have an " appointment. You may need more than 1 visit for this, to be in an intensive therapy program, or both.  Before we can give you medicine for ADHD, we may refer you to get tested for it or confirm it another way.  We may not be able to give you an emotional support animal letter.  We don't do mental health checks ordered by the court.   We don't do mental health testing, but we can refer you to get tested.   Thank you for choosing us for your care.  For informational purposes only. Not to replace the advice of your health care provider. Copyright   2022 [facility]. All rights reserved. [address] - 12/22.     Plan:  1.Patient will take the medications as prescribed.   Medications: Take Prozac 20 mg daily for anxiety and depression  Continue Propranolol 10 mg three times daily as needed for anxiety and panic attacks  Patient also take Adderall 90 mg total daily dose- Per PCP. Patient is aware I will not prescribe this high dose  Patient will not stop taking medications or adjust them without consulting with the provider.  2.Patient will call with any problems between visits.  3.Patient will go to the emergency room if not feeling safe , unable to function in the community, or if suicidal, homicidal or hearing voices or having paranoia.  4.Patient will abstain from drugs and alcohol./Pt denies use .  5.Patient will not drive if sedated on medications or under influence of any substance. He does not drive.   6.Patient will not mix psychiatric medications with drugs and alcohol.   7.Patient will watch his diet and exercise.  8.Patient will see non psychiatric providers for non psychiatric disorders.  9. Next appointment in one  month

## 2024-10-22 NOTE — TELEPHONE ENCOUNTER
Caller: Dax    Provider: PORTER Miranda    Detailed reason for call: Patient states he called [facility] to schedule per Dr. Miranda' recommendation, but they didn't know anything about him.  He  is asking for a referral to be sent to their fax number below so that he is scheduled correctly.  Fax: [phone]    He would also like a call back to confirm that the referral has been sent so he knows when to call them again to schedule.     Best phone number to contact: [phone]    Best time to contact: any    Ok to leave a detailed message: Yes    Ok to speak to authorized person if needed: No      (Noted to patient if reason is related to wound or incision, to please send a photo via email or [employer].)

## 2024-10-25 ENCOUNTER — PATIENT OUTREACH (OUTPATIENT)
Dept: CARE COORDINATION | Facility: CLINIC | Age: 34
End: 2024-10-25
Payer: MEDICARE

## 2024-10-26 ENCOUNTER — TELEPHONE (OUTPATIENT)
Dept: FAMILY MEDICINE | Facility: CLINIC | Age: 34
End: 2024-10-26
Payer: MEDICARE

## 2024-10-26 DIAGNOSIS — F90.2 ATTENTION DEFICIT HYPERACTIVITY DISORDER (ADHD), COMBINED TYPE: ICD-10-CM

## 2024-10-26 NOTE — TELEPHONE ENCOUNTER
Medication Question or Refill    Contacts       Contact Date/Time Type Contact Phone/Fax    10/26/2024 11:53 AM CDT Phone (Incoming) Dax Villareal (Self) [phone] (M)            What medication are you calling about (include dose and sig)?: Adderall (30 mg)    Preferred Pharmacy:   [id number] - [place], [place] - [address] AT [facility] [address]  [place] [address]  Phone: [phone] Fax: [phone]      Controlled Substance Agreement on file:   CSA -- Patient Level:    CSA: None found at the patient level.       Who prescribed the medication?: PCP    Do you need a refill? Yes    When did you use the medication last? Out of currently     Patient offered an appointment?     Do you have any questions or concerns?  Pt said should be an automatic refill and is not       Could we send this information to you in [facility] or would you prefer to receive a phone call?:   Patient would prefer a phone call   Okay to leave a detailed message?: Yes at Cell number on file:    Telephone Information:   Mobile [phone]

## 2024-10-28 ENCOUNTER — PATIENT OUTREACH (OUTPATIENT)
Dept: CARE COORDINATION | Facility: CLINIC | Age: 34
End: 2024-10-28
Payer: MEDICARE

## 2024-10-28 RX ORDER — DEXTROAMPHETAMINE SACCHARATE, AMPHETAMINE ASPARTATE, DEXTROAMPHETAMINE SULFATE AND AMPHETAMINE SULFATE 7.5; 7.5; 7.5; 7.5 MG/1; MG/1; MG/1; MG/1
TABLET ORAL
Qty: 90 TABLET | Refills: 0 | Status: SHIPPED | OUTPATIENT
Start: 2024-12-27

## 2024-10-28 RX ORDER — DEXTROAMPHETAMINE SACCHARATE, AMPHETAMINE ASPARTATE, DEXTROAMPHETAMINE SULFATE AND AMPHETAMINE SULFATE 7.5; 7.5; 7.5; 7.5 MG/1; MG/1; MG/1; MG/1
TABLET ORAL
Qty: 90 TABLET | Refills: 0 | Status: SHIPPED | OUTPATIENT
Start: 2024-10-28

## 2024-10-28 RX ORDER — DEXTROAMPHETAMINE SACCHARATE, AMPHETAMINE ASPARTATE, DEXTROAMPHETAMINE SULFATE AND AMPHETAMINE SULFATE 7.5; 7.5; 7.5; 7.5 MG/1; MG/1; MG/1; MG/1
TABLET ORAL
Qty: 90 TABLET | Refills: 0 | Status: SHIPPED | OUTPATIENT
Start: 2024-11-28

## 2024-10-28 NOTE — TELEPHONE ENCOUNTER
Routing Message to provider.    Patient requesting refill of Adderall .    Pharmacy pended if agreeable.    Christine M Klisch, RN

## 2024-11-19 ENCOUNTER — VIRTUAL VISIT (OUTPATIENT)
Dept: PSYCHIATRY | Facility: CLINIC | Age: 34
End: 2024-11-19
Payer: MEDICARE

## 2024-11-19 DIAGNOSIS — F90.2 ATTENTION DEFICIT HYPERACTIVITY DISORDER (ADHD), COMBINED TYPE: ICD-10-CM

## 2024-11-19 DIAGNOSIS — F10.11 ALCOHOL ABUSE, IN REMISSION: ICD-10-CM

## 2024-11-19 DIAGNOSIS — F41.1 GAD (GENERALIZED ANXIETY DISORDER): ICD-10-CM

## 2024-11-19 DIAGNOSIS — F33.1 MODERATE EPISODE OF RECURRENT MAJOR DEPRESSIVE DISORDER (H): Primary | ICD-10-CM

## 2024-11-19 ASSESSMENT — ANXIETY QUESTIONNAIRES
GAD7 TOTAL SCORE: 10
1. FEELING NERVOUS, ANXIOUS, OR ON EDGE: MORE THAN HALF THE DAYS
GAD7 TOTAL SCORE: 10
7. FEELING AFRAID AS IF SOMETHING AWFUL MIGHT HAPPEN: NOT AT ALL
4. TROUBLE RELAXING: MORE THAN HALF THE DAYS
8. IF YOU CHECKED OFF ANY PROBLEMS, HOW DIFFICULT HAVE THESE MADE IT FOR YOU TO DO YOUR WORK, TAKE CARE OF THINGS AT HOME, OR GET ALONG WITH OTHER PEOPLE?: SOMEWHAT DIFFICULT
3. WORRYING TOO MUCH ABOUT DIFFERENT THINGS: NEARLY EVERY DAY
5. BEING SO RESTLESS THAT IT IS HARD TO SIT STILL: NOT AT ALL
7. FEELING AFRAID AS IF SOMETHING AWFUL MIGHT HAPPEN: NOT AT ALL
GAD7 TOTAL SCORE: 10
6. BECOMING EASILY ANNOYED OR IRRITABLE: NOT AT ALL
2. NOT BEING ABLE TO STOP OR CONTROL WORRYING: NEARLY EVERY DAY
IF YOU CHECKED OFF ANY PROBLEMS ON THIS QUESTIONNAIRE, HOW DIFFICULT HAVE THESE PROBLEMS MADE IT FOR YOU TO DO YOUR WORK, TAKE CARE OF THINGS AT HOME, OR GET ALONG WITH OTHER PEOPLE: SOMEWHAT DIFFICULT

## 2024-11-19 ASSESSMENT — PAIN SCALES - GENERAL: PAINLEVEL_OUTOF10: SEVERE PAIN (6)

## 2024-11-19 NOTE — PROGRESS NOTES
"PSYCHIATRIC PROGRESS NOTE     Name:  Dax Villareal  : 1990    Dax Villareal is a 33 year old male who is being evaluated via a billable Video visit.      Telemedicine Visit: The patient's condition can be safely assessed and treated via synchronous audio and visual telemedicine encounter.      Reason for Telemedicine Visit: COVID 19 pandemic and the social and physical recommendations by the CDC and MD., Patient has requested telehealth visit, and Patient unable to travel      Originating Site (Patient Location): Patient's home    Distant Site (Provider Location): [facility]    Consent:  The patient/guardian has verbally consented to: the potential risks and benefits of telemedicine (video visit or phone) versus in person care; bill my insurance or make self-payment for services provided; and responsibility for payment of non-covered services.     Mode of Communication:  Stad[employer] platform     As the provider I attest to compliance with applicable laws and regulations related to telemedicine.     Date of Last Visit: 10/22/24                                         CHIEF COMPLAINT     \"I'm doing well\"  HISTORY OF PRESENT ILLNESS     Patient who was last seen in the clinic on  10/22/24 returned today for follow up visit.  Patient reports he is doing fairly well, stating mood, depression, and anxiety are manageable at this time.  Patient does mention he has not been taking Prozac consistently as he forgets to take medication sometimes in the morning.  Patient reports he plans to consistently taking this medication going forward.  Patient reports he has gotten a scooter which helps with mobility and moving around.  Patient reported he was upset for a few days last week due to missing is [employer] paid $ 200 for.  Patient reported he must of forgotten due to the stress relating to his dialysis schedule.  Patient currently denies both suicidal and homicidal ideation.  He " also denies both auditory and visual hallucination.  Patient report normal and hypomanic.  Patient return to clinic in 6 weeks for follow-up.  PSYCHIATRIC HISTORY:   History of Psychiatric Hospitalizations:   - Inpatient: None  - IOP/PHP/Day treatment: None  History of Suicidal Ideation: Negative  History of Suicide Attempts:  Negative    History of Self-injurious Behavior: Denies a history of SIB.  Current:  No  History of Violence/Aggression: Negative  History of Commitment? Negative  Electroconvulsive Therapy (ECT):Negative    PSYCHIATRIC REVIEW OF SYSTEMS:   Psychiatric Review of Systems:   Depression:   Denies: depressed mood, suicidal ideation, decreased interest, changes in sleep, changes in appetite, guilt, hopelessness, helplessness, impaired concentration, decreased energy, irritability.  Diana:   Denies: sleeplessness, increased goal-directed activities, abrupt increase in energy pressured speech  Psychosis:   Denies: visual hallucinations, auditory hallucinations, paranoia  Anxiety:   Reports: excessive worries that are difficult to control, panic attacks  PTSD:   Denies: re-experiencing past trauma, nightmares, increased arousal, avoidance of traumatic stimuli, impaired function.  OCD:   Denies: obsessions, checking, symmetry, cleaning, skin picking.  Eating Disorder:   Denies: restriction, binging, purging.    Sleep:       MEDICATIONS                                                                                                Current Outpatient Medications   Medication Sig Dispense Refill    amphetamine-dextroamphetamine (ADDERALL) 30 MG tablet 2 tab in Am and one tab at 2 PM 90 tablet 0    [START ON 11/28/2024] amphetamine-dextroamphetamine (ADDERALL) 30 MG tablet 2 tab in AM and one tab at 2 PM 90 tablet 0    [START ON 12/27/2024] amphetamine-dextroamphetamine (ADDERALL) 30 MG tablet 2 tab in AM and one tab at 2 PM 90 tablet 0    baclofen (LIORESAL) 10 MG tablet Take 1 tablet (10 mg) by mouth 3  times daily 90 tablet 2    buprenorphine (BUTRANS) 5 MCG/HR WK patch Place 1 patch onto the skin every 7 days.      ciclopirox (LOPROX) 0.77 % cream Apply topically 2 times daily To feet and toenails. 90 g 5    FLUoxetine (PROZAC) 20 MG capsule Take 1 capsule (20 mg) by mouth daily. 30 capsule 1    hydrOXYzine HCl (ATARAX) 25 MG tablet TAKE 1 TABLET(25 MG) BY MOUTH THREE TIMES DAILY AS NEEDED FOR ITCHING 90 tablet 0    lidocaine (XYLOCAINE) 5 % external ointment Apply topically as needed for moderate pain (qid as needed) Apply to feet and knees qid for 10 days, then as needed 240 g 0    lipase-protease-amylase (CREON 24) 51475-26[address]-912721 units CPEP per EC capsule 1-2 capsules by Per Feeding Tube route every 4 hours 360 capsule 1    midodrine (PROAMATINE) 10 MG tablet One tab before Dialysis 30 tablet 0    nortriptyline (PAMELOR) 25 MG capsule Take 25 mg by mouth at bedtime. PRN      oxyCODONE (ROXICODONE) 5 MG tablet Take 1 tablet (5 mg) by mouth every 6 hours as needed for pain 6 tablet 0    pantoprazole (PROTONIX) 40 MG EC tablet Take 1 tablet (40 mg) by mouth daily 90 tablet 2    pregabalin (LYRICA) 300 MG capsule TAKE 1 CAPSULE BY MOUTH TWICE DAILY 180 capsule 0    prochlorperazine (COMPAZINE) 5 MG tablet Take 1 tablet (5 mg) by mouth every 8 hours as needed for nausea or vomiting 90 tablet 3    propranolol (INDERAL) 10 MG tablet Take 1 tablet (10 mg) by mouth 3 times daily as needed (as needed) 90 tablet 0    sevelamer carbonate, RENVELA, 0.8 GM PACK Packet 2 packets (1.6 g) by Oral or Feeding Tube route 3 times daily      silver nitrate (ARZOL) 75-25 % miscellaneous Apply topically daily as needed for wound care      torsemide (DEMADEX) 20 MG tablet 2 tab daily 180 tablet 3     No current facility-administered medications for this visit.       DRUG MONITORING:  [place] Prescription Monitoring Program evaluating controlled substances in the last year in [place]:  The [place] Prescription Monitoring Program has  "been reviewed and there are no current concerns with: diversionary activity, early refill requests, and or obtaining the medication from multiple providers       PAST PSYCHOTROPIC MEDICATIONS:  Adderall, Ritalin, Duloxetine, Hydroxyzine    VITALS   There were no vitals taken for this visit.     BP Readings from Last 1 Encounters:   07/23/24 109/75     Pulse Readings from Last 1 Encounters:   10/10/24 97     Wt Readings from Last 1 Encounters:   10/10/24 91.6 kg (202 lb)     Ht Readings from Last 1 Encounters:   04/18/24 1.702 m (5' 7\")     Estimated body mass index is 31.64 kg/m  as calculated from the following:    Height as of 4/18/24: 1.702 m (5' 7\").    Weight as of 10/10/24: 91.6 kg (202 lb).      PERTINENT HISTORY   PAST MEDICAL HISTORY:   Past Medical History:   Diagnosis Date    ADHD 2008    Alcohol abuse, in remission     Alcoholic cirrhosis of liver with ascites (H)     Anemia in chronic kidney disease     Chronic pain     Current smoker     End stage renal disease (H)     Hypotension, unspecified hypotension type     Malnutrition (H)     Metabolic acidosis     Necrotizing pancreatitis     Pancreatic insufficiency     Pericarditis     Recurrent pleural effusion        PAST SURGICAL HISTORY:   Past Surgical History:   Procedure Laterality Date    AV FISTULA PLACEMENT, BRACHIOCEPHALIC Left 05/11/2022    [facility]    COMBINED ESOPHAGOSCOPY, GASTROSCOPY, DUODENOSCOPY (EGD), TISSUE APPOSI N/A 09/20/2021    Procedure: ESOPHAGOGASTRODUODENOSCOPY WITH SUTURE FISTULA CLOSURE, argon plasma coagulation;  Surgeon: Jose Quigley MD;  [facility]: [facility]    ENDOSCOPIC INSERTION TUBE GASTROSTOMY N/A 07/13/2021    Procedure: Upper endoscopy, INSERTION, PEG-J TUBE and Gastropexy;  Surgeon: Rayshawn Will MD;  [facility]: [facility]    ENDOSCOPIC INSERTION TUBE GASTROSTOMY  09/13/2021    Procedure: PEG/J PLACEMENT.;  Surgeon: Rayshawn Will MD;  [facility]    ENDOSCOPIC ULTRASOUND UPPER GASTROINTESTINAL " TRACT (GI) N/A 07/21/2021    Procedure: ENDOSCOPIC ULTRASOUND, ESOPHAGOSCOPY / UPPER GASTROINTESTINAL TRACT (GI) with cyst gastrostomy, dilation;  Surgeon: Guru Yecenia Chacko MD;  Location: [facility]    ENDOSCOPIC ULTRASOUND, ESOPHAGOSCOPY, GASTROSCOPY, DUODENOSCOPY (EGD), NECROSECTOMY N/A 08/11/2021    Procedure: ESOPHAGOGASTRODUODENOSCOPY (EGD) with necrosectomy, stent exchange.;  Surgeon: Amadou Beasley MD;  [facility]    ESOPHAGOSCOPY, GASTROSCOPY, DUODENOSCOPY (EGD), COMBINED N/A 07/28/2021    Procedure: ESOPHAGOGASTRODUODENOSCOPY (EGD), gastrostomy apposition, Necrosectomy, stent exchange.;  Surgeon: Rayshawn Will MD;  [facility]    ESOPHAGOSCOPY, GASTROSCOPY, DUODENOSCOPY (EGD), COMBINED N/A 08/06/2021    Procedure: ESOPHAGOGASTRODUODENOSCOPY (EGD) with necrosectomy, and stents exchanged;  Surgeon: Jose Quigley MD;  [facility]    ESOPHAGOSCOPY, GASTROSCOPY, DUODENOSCOPY (EGD), COMBINED Left 09/13/2021    Procedure: ESOPHAGOGASTRODUODENOSCOPY (EGD), GASTROSTOMY TUBE REMOVAL, FISTULAR CLOSURE ENDOSCOPIC.;  Surgeon: Rayshawn Will MD;  [facility]    IR ABSCESS TUBE CHANGE  07/14/2021    IR NG TUBE PLACEMENT REQ RAD & FLUORO  10/26/2021    IR PARACENTESIS  07/21/2021    IR SINOGRAM INJECTION DIAGNOSTIC  08/25/2021    IR THORACENTESIS  02/05/2022    ORTHOPEDIC SURGERY Right     fracture repair    REPLACE GASTROSTOMY TUBE, PERCUTANEOUS N/A 07/28/2021    Procedure: gastro-jejunostomy tube exchange;  Surgeon: Rayshawn Will MD;  Location: [facility]       FAMILY HISTORY:   Family History   Problem Relation Age of Onset    Attention Deficit Disorder Mother     Alcoholism Mother     Cancer Mother     Alcoholism Father     Alcoholism Sister     Alcoholism Brother        SOCIAL HISTORY:   Social History     Tobacco Use    Smoking status: Every Day     Current packs/day: 0.25     Types: Cigarettes     Passive exposure: Current    Smokeless tobacco: Never     "Tobacco comments:     10/22/24 more or less around half a pack of cigs/day.          8/24/2021 Patient did not want to discuss quitting but would like 4 mg lozenge to use during admission. Patient also accepted \"Quitting for Good\" workbook   Substance Use Topics    Alcohol use: Not Currently     Comment: \"no alcohol for over 1 year ago\" per pt on 5/12/22         Seizures or Head Injury: Denies history of head injury. Denies history of seizures.  History of cardiac disease, rheumatic fever, fainting or dizziness, especially with exercise, seizures, chest pain or shortness of breath with exercise, unexplained change in exercise tolerance, palpitations, high blood pressure, or heart murmur?   No    LABS & IMAGING                                                                                                                Personally reviewed  Recent Labs   Lab Test 02/26/24  1226 08/10/21  0631 08/09/21  1312   WBC 6.1   < > 15.1*   HGB 13.0*   < > 8.3*   HCT 38.3*   < > 24.8*   MCV 88   < > 90      < > 290   ANEU  --   --  11.6*    < > = values in this interval not displayed.     Recent Labs   Lab Test 02/26/24  1226 10/19/23  1328 05/22/23  0430 05/22/23  0353    138   < > 134*   POTASSIUM 3.6 3.1*   < > 3.9   CHLORIDE 104 93*  --  98   CO2 23 32*  --  21*   GLC 99 160*   < > 98   JANENE 9.3 10.5*  --  10.0   MAG  --   --   --  2.4*   BUN 32.9* 25.1*  --  24.3*   CR 2.68* 3.10*  --  3.11*   GFRESTIMATED 31* 26*  --  26*   ALBUMIN  --  5.2  --  4.7   PROTTOTAL  --  8.9*  --  7.7   AST  --  26  --  30   ALT  --  29  --  31   ALKPHOS  --  121  --  117   BILITOTAL  --  0.7  --  0.7    < > = values in this interval not displayed.     Recent Labs   Lab Test 01/30/24  1537 06/28/22  0839   CHOL 260* 162   * 86   HDL 38* 62   TRIG 211* 70   A1C  --  4.6     Recent Labs   Lab Test 06/28/22  0839 08/19/21  0647   TSH 1.59 5.48*   T4  --  1.18     No results found for: \"PVA040\", \"MIDM983\", \"TBOB52OROOE\", " "\"VITD3\", \"D2VIT\", \"D3VIT\", \"DTOT\", \"[id number]\", \"[id number]\", \"[id number]\", \"[id number]\", \"[id number]\", \"[id number]\"     ALLERGY & IMMUNIZATIONS     No Known Allergies    FAMILY MEDICAL HISTORY:     Family History       Problem (# of Occurrences) Relation (Name,Age of Onset)    Cancer (1) Mother    Attention Deficit Disorder (1) Mother    Alcoholism (4) Mother, Father, Sister, Brother              Family history of sudden or unexplained death or an event requiring resuscitation in children or young adults, cardiac arrhythmias (eg, Jo-Parkinson-White syndrome), long QT syndrome, catecholaminergic paroxysmal ventricular tachycardia, Brugada syndrome, arrhythmogenic right ventricular dysplasia, hypertrophic cardiomyopathy, dilated cardiomyopathy, or Marfan syndrome?  No    FAMILY PSYCHIATRIC HISTORY:   Psychiatry:Mother with Bipolar, brother with depression  Substance use history in family: Alcoholism runs in the family  Family suicide history:Brother committed suicide in 2012      SIGNIFICANT SOCIAL/FAMILY HISTORY:                                           Born and raised in: [place]  Relationship status: Single, living by himself in an apartment  Children: None    Highest education level was:Some college   Service:Denies  Employment status: on SSDI  LEGAL: Denies     SUBSTANCE USE HISTORY      Drug/treatment history and current pattern of use:   Drug of choice: Alcohol   Last alcohol use : 2021  History of alcohol abuse and history of cirrhosis and pancreatis and kidney failure   Nicotine -1/2 pack a day   Cannabis- Denies   Denies use of all other mood altering substances     MEDICAL REVIEW OF SYSTEMS:   Ten system review was completed with pertinent positives noted above    MENTAL STATUS EXAM:   Mental Status Examination (limited due to video virtual visit format):  Vital Signs: There were no vitals taken for this virtual visit.  Appearance: adequately groomed, appears stated age, and in no apparent " "distress.  Attitude: cooperative   Eye Contact: good to the extent that can be determined in a video visit  Muscle Strength and Tone: no gross abnormalities based on remote observation  Psychomotor Behavior:  no evidence of tardive dyskinesia, dystonia, or tics based on remote observation  Gait and Station: not assessed, on wheelchair  Speech: clear, coherent, normal prosody, regular rate, regular rhythm and fluent  Associations: No loosening of associations  Thought Process: coherent and goal directed  Thought Content: no evidence of suicidal ideation or homicidal ideation, no evidence of psychotic thought, no auditory hallucinations present and no visual hallucinations present  Mood: \"stressed out\"  Affect: appropriate and in normal range  Insight: good  Judgment: intact, adequate for safety  Impulse Control: intact  Oriented to: time, place, person and situation  Attention Span and Concentration: normal  Language: Intact  Recent and Remote Memory: intact to interview. Not formally assessed. No amnesia.  Fund of Knowledge: appropriate        SAFETY   Feels safe in home: Yes   Suicidal ideation: Denies  History of suicide attempts:  No   Hx of impulsivity: No     DSM 5 DIAGNOSIS:   1. DIMITRI (generalized anxiety disorder)    2. Alcohol abuse, in remission    3. Attention deficit hyperactivity disorder (ADHD), unspecified ADHD type    4. Moderate episode of recurrent major depressive disorder (H)    ASSESSMENT AND PLAN    Patient is a 33 year old,  White Not  or  male with a history of end-stage renal disease, generalized anxiety disorder, ADHD, alcohol abuse, in remission, depression, unspecified depression type who presents for follow up visit. .  Patient is currently on dialysis 3 times a week.  Apart from being upset for couple of days last week due to missing a concert that he paid $200 for, mood, depression, and anxiety are fairly under control.  Since patient has not been consistently taking " Prozac, and encouraged patient to start taking Prozac 20 mg daily to effectively help with residual depression and anxiety.  Patient currently denying both suicidal homicidal ideation.  He also denied both auditory and visual hallucination.  Patient report no saba or hypomania.  Patient return to clinic in 6 weeks for follow-up.    Plan:  1.Patient will take the medications as prescribed.   Medications: Continue Prozac 20 mg daily for anxiety and depression  Continue Propranolol 10 mg three times daily as needed for anxiety and panic attacks  Patient also take Adderall 90 mg total daily dose- Per PCP. Patient is aware I will not prescribe this high dose  Patient will not stop taking medications or adjust them without consulting with the provider.  2.Patient will call with any problems between visits.  3.Patient will go to the emergency room if not feeling safe , unable to function in the community, or if suicidal, homicidal or hearing voices or having paranoia.  4.Patient will abstain from drugs and alcohol./Pt denies use .  5.Patient will not drive if sedated on medications or under influence of any substance. He does not drive.   6.Patient will not mix psychiatric medications with drugs and alcohol.   7.Patient will watch his diet and exercise.  8.Patient will see non psychiatric providers for non psychiatric disorders.  9. Next appointment in 6 weeks    Risk Assessment:     Dax has notable risk factors for self-harm, including, single status, anxiety, pain,  and passive SI. However, risk is mitigated by ability to volunteer a safety plan and history of seeking help when needed. Additional steps taken to minimize risk include making medication adjustment, asking patient to call 911 and go to the ER if not able to stay safe at home,  Therefore, based on all available evidence including the factors cited above, Dax does not appear to be an imminent danger to self or others and does not meet criteria 72 hour hold.  However, if patient uses substances or is non-adherent with medication, their risk of decompensation and SI/HI will be elevated. This was discussed with the patient as she verbalized good understanding.   CONSULTS/REFERRALS:   Continue therapy  None at this time  Coordinate care with therapist as needed    MEDICAL:   None at this time  Coordinate care with PCP (Kole Gomez) as needed  Follow up with primary care provider as planned or for acute medical concerns.    PSYCHOEDUCATION:  Medication side effects and alternatives reviewed. Health promotion activities recommended and reviewed today. All questions addressed. Education and counseling completed regarding risks and benefits of medications and psychotherapy options.  Consent provided by patient/guardian  Call the psychiatric nurse line with medication questions or concerns at [phone].  GrubHubhart may be used to communicate with your provider, but this is not intended to be used for emergencies.  BLACK BOX WARNING: Discussed the Food and Drug Administration (FDA) requires that all antidepressants carry a warning that some children, adolescents and young adults may be at increased risk of suicide when taking antidepressants. Anyone taking an antidepressant should be watched closely for worsening depression or unusual behavior especially in the first few weeks after starting an SSRI. Keep in mind, antidepressants are more likely to reduce suicide risk in the long run by improving mood.   SEROTONIN SYNDROME:  Discussed risks of Serotonin syndrome (ie, serotonin toxicity) which is a potentially life-threatening condition associated with increased serotonergic activity in the central nervous system (CNS). It is seen with therapeutic medication use, inadvertent interactions between drugs, and intentional self-poisoning. Serotonin syndrome may involve a spectrum of clinical findings, which often include mental status changes, autonomic hyperactivity, and  neuromuscular abnormalities.    BENZODIAZEPINE:  discussion on how benzos work and the need to use them short term due to potential of anxiety getting.  This is a controlled substance with risk for abuse, need to keep in a safe keep place and cannot replace lost scripts.    HYPNOTIC USE: Hypnotic use, risk for CNS depression, sleep-walking, not to mix with ETOH or other CNS depressant, need for six hours of sleep, stop if change in mood.  This is a controlled substance with risk for abuse, need to keep in a safe keep place and cannot replace lost scripts.  FIRST GENERATION ANTIPSYCHOTIC/ SECOND GENERATION ANTIPSYCHOTIC USE:  Atypical need for cardiometabolic monitoring with medication- B/P, weight, blood sugar, cholesterol.  Need to monitor for abnormal movements taught  SAFETY:  We all care about your loved one's safety. To reduce the risk of self-harm, remove access to all:  Firearms, Medicines (both prescribed and over-the-counter), Knives and other sharp objects, Ropes and like materials, and Alcohol  SLEEP HYGIENE: establish a sleep routine, limit screen time 1 hour prior to bed, use bed for sleep only, take sleep/medications on time (including sleepy time tea, trazadone or herbal treatments such as melatonin), aroma therapy, limit caffeine/sugar, yoga, guided imagery, stretch, meditation, limit naps to 20 minutes, make a temperature change in the room, white noise, be mindful of slowing down breathing, take a warm bath/shower, frequently wash sheets, and journaling.   Medlineplus.gov is information for patients.  It is run by the National Library of Medicine and it contains information about all disorders, diseases and all medications.      COMMUNITY RESOURCES:    CRISIS NUMBERS: Provided in AVS 10/22/2024  National Suicide Prevention Lifeline: 0-308-107-TALK (683-519-5031)  RightSignature/resources for a list of additional resources (SOS)            [facility] - [phone]   Urgent Care  Adult [facility]-[phone] mobile unit/  crisis line  [facility] -[phone]   COPE  [place] Mobile Team -[phone] (adults)/ [phone] (child)  Poison Control Center - [phone]    OR  go to nearest ER  Crisis Text Line for any crisis  send this-   To: 705601   [facility] ([facility]) [facility]  [phone]  OTHER  National Suicide Prevention Lifeline: 843.225.7562 (TTY: 377.882.1784). Call anytime for help.  (www.suicidepreventionlifeline.org)  National [place] [employer] Mental Illness (www.baljinder.org): 333.716.1910 or 843-351-9002.   Mental Health Association (www.mentalhealth.org): 341.240.8609 or 690-376-7179.  [place] Crisis Text Line: Text [place] to 670466  Suicide LifeLine Chat: suicidepreModafirmaline.org/chat    ADMINISTRATIVE BILLIN min spent interviewing patient, reviewing referral documents, obtaining and reviewing outside records, communication with other health specialists, and preparing this report on this day:24     Video/Phone Start Time:  1104  Video/Phone End Time:   1124    Greater than 50% of time was spent in counseling and coordination of care regarding above diagnoses and treatment plan.    Signed:   Gianluca Her, MSN, APRN, PMHNP-BC  Long Term Outpatient Psychiatry  Chart documentation done in part with Dragon Voice Recognition software.  Although reviewed after completion, some word and grammatical errors may remain.   Answers submitted by the patient for this visit:  Patient Health Questionnaire (Submitted on 10/22/2024)  If you checked off any problems, how difficult have these problems made it for you to do your work, take care of things at home, or get along with other people?: Extremely difficult  PHQ9 TOTAL SCORE: 6    Answers submitted by the patient for this visit:  Patient Health Questionnaire (G7) (Submitted on 2024)  DIMITRI 7 TOTAL SCORE: 10

## 2024-11-19 NOTE — NURSING NOTE
Current patient location: [address]   [place] [address]    Is the patient currently in the state of [place]? YES    Visit mode:VIDEO    If the visit is dropped, the patient can be reconnected by:VIDEO VISIT: Text to cell phone:   Telephone Information:   Mobile [phone]    and VIDEO VISIT: Send to e-mail at: [email or web address]    Will anyone else be joining the visit? NO  (If patient encounters technical issues they should call [phone]    Are changes needed to the allergy or medication list? No    Are refills needed on medications prescribed by this physician? NO    Rooming Documentation:  Questionnaire(s) completed    Reason for visit: RECHECK    Valentine MULLEN

## 2024-11-19 NOTE — PATIENT INSTRUCTIONS
Plan:  1.Patient will take the medications as prescribed.   Medications: Continue Prozac 20 mg daily for anxiety and depression  Continue Propranolol 10 mg three times daily as needed for anxiety and panic attacks  Patient also take Adderall 90 mg total daily dose- Per PCP. Patient is aware I will not prescribe this high dose  Patient will not stop taking medications or adjust them without consulting with the provider.  2.Patient will call with any problems between visits.  3.Patient will go to the emergency room if not feeling safe , unable to function in the community, or if suicidal, homicidal or hearing voices or having paranoia.  4.Patient will abstain from drugs and alcohol./Pt denies use .  5.Patient will not drive if sedated on medications or under influence of any substance. He does not drive.   6.Patient will not mix psychiatric medications with drugs and alcohol.   7.Patient will watch his diet and exercise.  8.Patient will see non psychiatric providers for non psychiatric disorders.  9. Next appointment in 6 weeks

## 2024-11-19 NOTE — PROGRESS NOTES
Virtual Visit Details    Type of service:  Video Visit     Video/Phone Start Time:  1104  Video/Phone End Time:   1124  Originating Location (pt. Location): Home    Distant Location (provider location):  Off-site  Platform used for Video Visit: La

## 2024-12-16 ENCOUNTER — PATIENT OUTREACH (OUTPATIENT)
Dept: CARE COORDINATION | Facility: CLINIC | Age: 34
End: 2024-12-16
Payer: MEDICARE

## 2024-12-16 NOTE — PROGRESS NOTES
Community Health Worker Initial Outreach    CHW Initial Information Gathering:  Referral Source: PCP  Preferred Hospital: [facility]  [phone]  Preferred Urgent Care: [facility] [place], [phone]  Current living arrangement:: I live alone  Type of residence:: Apartment - handicap accessible  Community Resources: Dialysis Services  Equipment Currently Used at Home: grab bar, tub/shower, wheelchair, power, shower chair  Informal Support system:: Family, Friends, Neighbors  No PCP office visit in Past Year: No  Transportation means:: Medical transport  CHW Additional Questions  If ED/Hospital discharge, follow-up appointment scheduled as recommended?: N/A  Medication changes made following ED/Hospital discharge?: N/A  MyChart active?: Yes  Patient sent Social Drivers of Health questionnaire?: No    Patient accepts CC: Yes. Patient scheduled for assessment with CC SW on 12/18 at 11 am. Patient noted desire to discuss coordination of care, housing, county services coordination.     BLANE Lares  [facility] [place] and [facility]  [phone]

## 2024-12-18 ENCOUNTER — PATIENT OUTREACH (OUTPATIENT)
Dept: NURSING | Facility: CLINIC | Age: 34
End: 2024-12-18
Payer: MEDICARE

## 2024-12-19 ENCOUNTER — REFERRAL (OUTPATIENT)
Dept: TRANSPLANT | Facility: CLINIC | Age: 34
End: 2024-12-19
Payer: MEDICARE

## 2024-12-19 ENCOUNTER — MEDICAL CORRESPONDENCE (OUTPATIENT)
Dept: HEALTH INFORMATION MANAGEMENT | Facility: CLINIC | Age: 34
End: 2024-12-19
Payer: MEDICARE

## 2024-12-19 DIAGNOSIS — N18.6 ESRD (END STAGE RENAL DISEASE) (H): Primary | ICD-10-CM

## 2024-12-19 ASSESSMENT — ACTIVITIES OF DAILY LIVING (ADL): DEPENDENT_IADLS:: CLEANING;COOKING;LAUNDRY;SHOPPING;MEAL PREPARATION;TRANSPORTATION

## 2024-12-19 NOTE — PROGRESS NOTES
"Clinic Care Coordination Contact    SW initial phone assessment with patient.      Patient's goal of this call is for SW to coordinate all appts.  SW informed CC does not schedule appts.  SW discussed the role of an [facility] worker and offered to make referral online.  SW informed this person could possibly assist with an easier process for him to coordinate appts and offer additional support.  This was declined.  Patient expressed being quite frustrated he \"cannot have someone take care of this\" and states the \"way he was wronged by the state, the [facility] services and the facility, he is entitled to this\".  SW apologized for being unable to assist him and for his struggles mentioned above.  SW encouraged him to discuss his concerns with these agencies.  Patient stated \"believe me, I have, and continue to do so\".  SW inquired on other supportive resources assisting him.  Patient reported that Loi, a current housing worker, is assisting with housing needs.  However, will be transferring this role to patient's [facility] CADI worker, who will continue to support him in this manner.  (Please see specialty SW note 8/1/2024).      SW encouraged patient to discuss additional supportive resources with CADI  for housing and PCA needs.      SW will not intervene further as patient is already working with a [facility] worker on his requested needs, this would also be duplication of services     DARNELL Heart, MSW   [facility]  Care Coordination  [facility]  [phone]  12/19/2024 11:24 AM   "
NA

## 2024-12-19 NOTE — LETTER
December 23, 2024    Dax Villareal  [address]  [place] [address]    Dear Dax,    Thank you for your interest in the Transplant Center at [facility]. We look forward to being a part of your care team and assisting you through the transplant process.    As we discussed, your transplant coordinator is Shaina Mora (Kidney).  You may call your coordinator at any time with questions or concerns.  Your first scheduled call will be on 1/14/2025 between 1:00 PM- 4:00 PM.  If this needs to change, call [phone].    Please complete the following.    Fill out and return the enclosed forms  Authorization for Electronic Communication  Authorization to Discuss Protected Health Information  Authorization for Release of Protected Health Information    Sign up for:  [employer], access to your electronic medical record (see enclosed pamphlet)  [facility], a transplant education website    You can use these tools to learn more about your transplant, communicate with your care team, and track your medical details       My Transplant Place    Sincerely,    [facility]  Solid Organ Transplant Care Programs  [phone], Option 5    cc: Referring Physician

## 2024-12-23 VITALS — BODY MASS INDEX: 28.72 KG/M2 | HEIGHT: 67 IN | WEIGHT: 182.98 LBS

## 2024-12-23 NOTE — TELEPHONE ENCOUNTER
SOT KIDNEY INTAKE   Intake completed with Dax Villareal  December 23, 2024    CARE TEAM    PCP: Kole Gomez MD   Referring Organization: [facility]  Referring Provider:   Referring Diagnosis: ESRD    Insurance: Medicare ID: [id number]   Medica ID: [id number] Group: [id number]  Medicaid- But does not have ID number    SCREENING     GFR/Date: 31 2/26/2024    Is patient diabetic? No (if not diabetic go to next section)  Is patient on insulin? No  Is patient under the age of 65? No  Was patient offered a pancreas transplant referral? No    Previous transplants No  Cancer history: No  Cardiac history: Yes  Biopsy No  Oxygen use at rest: No with activity: No    BMI: 28.66 (BMI> 40 - RNCC call only/ no PKE date)      Is patient in a group home/assisted living? Lives at home      Does patient have a guardian? No    WRAP UP  Referral intake process completed.  Patient is aware that after financial approval is received, medical records will be requested.   Patient confirmed for a callback from transplant coordinator on 1/14/2025. (within 2 weeks)  Tentative evaluation date 5/1/2025 slot 2 .    Confirmed coordinator will discuss evaluation process in more detail at the time of their call.   Patient is aware of the need to arrange age appropriate cancer screening, vaccinations, and dental care.  Reminded patient to complete questionnaire, complete medical records release, and review packet prior to evaluation visit .  Assessed patient for special needs (ie-wheelchair, assistance, guardian, and ):  cane, electric scooter   Patient instructed to call [phone] with questions.     Patient gave verbal consent during intake call to obtain medical records and documents outside of [facility]:  Yes  Patient agrees to [employer] Yes

## 2024-12-26 ENCOUNTER — OFFICE VISIT (OUTPATIENT)
Dept: FAMILY MEDICINE | Facility: CLINIC | Age: 34
End: 2024-12-26
Payer: MEDICARE

## 2024-12-26 VITALS
BODY MASS INDEX: 29.32 KG/M2 | WEIGHT: 186.8 LBS | SYSTOLIC BLOOD PRESSURE: 120 MMHG | RESPIRATION RATE: 16 BRPM | DIASTOLIC BLOOD PRESSURE: 72 MMHG | HEIGHT: 67 IN | OXYGEN SATURATION: 97 % | TEMPERATURE: 98.3 F | HEART RATE: 87 BPM

## 2024-12-26 DIAGNOSIS — F90.2 ATTENTION DEFICIT HYPERACTIVITY DISORDER (ADHD), COMBINED TYPE: Primary | ICD-10-CM

## 2024-12-26 DIAGNOSIS — F33.1 MODERATE EPISODE OF RECURRENT MAJOR DEPRESSIVE DISORDER (H): ICD-10-CM

## 2024-12-26 DIAGNOSIS — F41.1 GAD (GENERALIZED ANXIETY DISORDER): ICD-10-CM

## 2024-12-26 DIAGNOSIS — N52.8 OTHER MALE ERECTILE DYSFUNCTION: ICD-10-CM

## 2024-12-26 RX ORDER — DEXTROAMPHETAMINE SACCHARATE, AMPHETAMINE ASPARTATE, DEXTROAMPHETAMINE SULFATE AND AMPHETAMINE SULFATE 7.5; 7.5; 7.5; 7.5 MG/1; MG/1; MG/1; MG/1
TABLET ORAL
Qty: 90 TABLET | Refills: 0 | Status: SHIPPED | OUTPATIENT
Start: 2025-01-27

## 2024-12-26 RX ORDER — DEXTROAMPHETAMINE SACCHARATE, AMPHETAMINE ASPARTATE MONOHYDRATE, DEXTROAMPHETAMINE SULFATE AND AMPHETAMINE SULFATE 7.5; 7.5; 7.5; 7.5 MG/1; MG/1; MG/1; MG/1
CAPSULE, EXTENDED RELEASE ORAL
Qty: 90 CAPSULE | Refills: 0 | Status: SHIPPED | OUTPATIENT
Start: 2025-03-28

## 2024-12-26 RX ORDER — PROCHLORPERAZINE MALEATE 5 MG/1
5 TABLET ORAL EVERY 8 HOURS PRN
Qty: 90 TABLET | Refills: 3 | Status: CANCELLED | OUTPATIENT
Start: 2024-12-26

## 2024-12-26 RX ORDER — DEXTROAMPHETAMINE SACCHARATE, AMPHETAMINE ASPARTATE, DEXTROAMPHETAMINE SULFATE AND AMPHETAMINE SULFATE 7.5; 7.5; 7.5; 7.5 MG/1; MG/1; MG/1; MG/1
TABLET ORAL
Qty: 90 TABLET | Refills: 0 | Status: SHIPPED | OUTPATIENT
Start: 2025-02-28

## 2024-12-26 ASSESSMENT — ANXIETY QUESTIONNAIRES
GAD7 TOTAL SCORE: 8
4. TROUBLE RELAXING: SEVERAL DAYS
3. WORRYING TOO MUCH ABOUT DIFFERENT THINGS: MORE THAN HALF THE DAYS
7. FEELING AFRAID AS IF SOMETHING AWFUL MIGHT HAPPEN: NOT AT ALL
IF YOU CHECKED OFF ANY PROBLEMS ON THIS QUESTIONNAIRE, HOW DIFFICULT HAVE THESE PROBLEMS MADE IT FOR YOU TO DO YOUR WORK, TAKE CARE OF THINGS AT HOME, OR GET ALONG WITH OTHER PEOPLE: EXTREMELY DIFFICULT
7. FEELING AFRAID AS IF SOMETHING AWFUL MIGHT HAPPEN: NOT AT ALL
6. BECOMING EASILY ANNOYED OR IRRITABLE: NOT AT ALL
2. NOT BEING ABLE TO STOP OR CONTROL WORRYING: NEARLY EVERY DAY
8. IF YOU CHECKED OFF ANY PROBLEMS, HOW DIFFICULT HAVE THESE MADE IT FOR YOU TO DO YOUR WORK, TAKE CARE OF THINGS AT HOME, OR GET ALONG WITH OTHER PEOPLE?: EXTREMELY DIFFICULT
GAD7 TOTAL SCORE: 8
1. FEELING NERVOUS, ANXIOUS, OR ON EDGE: MORE THAN HALF THE DAYS
GAD7 TOTAL SCORE: 8
5. BEING SO RESTLESS THAT IT IS HARD TO SIT STILL: NOT AT ALL

## 2024-12-26 ASSESSMENT — PATIENT HEALTH QUESTIONNAIRE - PHQ9
SUM OF ALL RESPONSES TO PHQ QUESTIONS 1-9: 10
10. IF YOU CHECKED OFF ANY PROBLEMS, HOW DIFFICULT HAVE THESE PROBLEMS MADE IT FOR YOU TO DO YOUR WORK, TAKE CARE OF THINGS AT HOME, OR GET ALONG WITH OTHER PEOPLE: EXTREMELY DIFFICULT
SUM OF ALL RESPONSES TO PHQ QUESTIONS 1-9: 10

## 2024-12-26 ASSESSMENT — PAIN SCALES - GENERAL: PAINLEVEL_OUTOF10: NO PAIN (0)

## 2024-12-26 NOTE — TELEPHONE ENCOUNTER
JOSE DE JESUS Her pt    Date of Last Office Visit: 11/19/24  Date of Next Office Visit:  1/9/25  No shows since last visit: No  More than one patient-initiated cancellation (with reschedule) since last seen in clinic? No    []Medication refilled per  Medication Refill in Ambulatory Care  policy.  [x]Medication unable to be refilled by RN due to criteria not met as indicated below:    []Eligibility: has not had a provider visit within last 6 months   []Supervision: no future appointment; < 7 days before next appointment   []Compliance: no shows; cancellations; lapse in therapy   []Verification: order discrepancy; may need modification...   [] > 30-day supply request   []Advanced refill request: > 7 days before refill date   []Controlled medication   []Medication not included in policy   []Review: new med; med adjusted <= 30 days; safety alert; requires lab monitoring...   [x]Scope of Practice: refill request processed by LPN/MA   []Other:      Medication(s) requested:     -  FLUoxetine (PROZAC) 20 MG capsule   Date last ordered: 10/22/24  Qty: 30  Refills: 1      FLUoxetine (PROZAC) 20 MG capsule 30 capsule 1 10/22/2024 -- No   Sig - Route: Take 1 capsule (20 mg) by mouth daily. - Oral   Sent to pharmacy as: FLUoxetine HCl 20 MG Oral Capsule (PROzac)   Class: E-Prescribe     Any Controlled Substance(s)? No      Requested medication(s) verified as identical to current order? Yes    Any lapse in adherence to medication(s) greater than 5 days? No      Additional action taken? routed encounter to provider for review.      Last visit treatment plan:   Plan:  1.Patient will take the medications as prescribed.   Medications: Continue Prozac 20 mg daily for anxiety and depression  Continue Propranolol 10 mg three times daily as needed for anxiety and panic attacks  Patient also take Adderall 90 mg total daily dose- Per PCP. Patient is aware I will not prescribe this high dose  Patient will not stop taking medications or adjust them  without consulting with the provider.  2.Patient will call with any problems between visits.  3.Patient will go to the emergency room if not feeling safe , unable to function in the community, or if suicidal, homicidal or hearing voices or having paranoia.  4.Patient will abstain from drugs and alcohol./Pt denies use .  5.Patient will not drive if sedated on medications or under influence of any substance. He does not drive.   6.Patient will not mix psychiatric medications with drugs and alcohol.   7.Patient will watch his diet and exercise.  8.Patient will see non psychiatric providers for non psychiatric disorders.  9. Next appointment in 6 weeks    Any medication(s) require lab monitoring? No

## 2024-12-26 NOTE — PROGRESS NOTES
"  Assessment & Plan     Attention deficit hyperactivity disorder (ADHD), combined type  Refilled medicine.  No side effects.  - amphetamine-dextroamphetamine (ADDERALL) 30 MG tablet; 2 tab in Am and one tab at 2 PM  - amphetamine-dextroamphetamine (ADDERALL) 30 MG tablet; 2 tab in AM and one tab at 2 PM  - amphetamine-dextroamphetamine (ADDERALL XR) 30 MG 24 hr capsule; 2 tab in AM and one tab at 2 PM    Other male erectile dysfunction    - Adult Urology  Referral; Future    Patient on hemodialysis, in addition, he is taking multiple medications could be affecting his erectile dysfunctions.    Previously, patient was requesting a letter for his [facility]  to help him to have more resources in assisting with his activities of daily living.  However, he reports he does have CADI workers who is helping with that. In addition, he does not need to have any more letter.  In addition, he is been in touch with our .    Nicotine/Tobacco Cessation  He reports that he has been smoking cigarettes. He has been exposed to tobacco smoke. He has never used smokeless tobacco.  Nicotine/Tobacco Cessation Plan  Cutting down,      BMI  Estimated body mass index is 29.26 kg/m  as calculated from the following:    Height as of this encounter: 1.702 m (5' 7\").    Weight as of this encounter: 84.7 kg (186 lb 12.8 oz).   Weight management plan: Discussed healthy diet and exercise guidelines      Work on weight loss  Regular exercise    David Verduzco is a 34 year old, presenting for the following health issues:  Patient Request for Note/Letter (Letter to )        12/26/2024     4:00 PM   Additional Questions   Roomed by Valentina RODGERS MA         12/26/2024   Forms   Any forms needing to be completed Yes     History of Present Illness       Back Pain:  He presents for follow up of back pain. Patient's back pain is a chronic problem.  Location of back pain:  Right lower back, left lower back, " "right middle of back, left middle of back, right hip and right side of waist  Description of back pain: cramping and dull ache  Back pain spreads: right side of neck    Since patient first noticed back pain, pain is: always present, but gets better and worse  Does back pain interfere with his job:  Not applicable       CKD: He is not using over the counter pain medicine.     Mental Health Follow-up:  Patient presents to follow-up on Anxiety.    Patient's anxiety since last visit has been:  Bad  The patient is having other symptoms associated with anxiety.  Any significant life events: relationship concerns, job concerns, financial concerns, housing concerns, grief or loss and health concerns  Patient is feeling anxious or having panic attacks.  Patient has no concerns about alcohol or drug use.    Reason for visit:  Issues including but not limited to County/State benefits,quality of life,and long term managed care.    He eats 2-3 servings of fruits and vegetables daily.He consumes 4 sweetened beverage(s) daily.He exercises with enough effort to increase his heart rate 30 to 60 minutes per day.  He exercises with enough effort to increase his heart rate 7 days per week.   He is taking medications regularly.       Patient is wanting to discuss letter so he can get better housing and home care. He states that social workers has not been responsible for his care plan and he has been receiving the small end of the stick. He wants to discuss further actions to take in order to get the best care he can get for his care plans.         Review of Systems  Constitutional, HEENT, cardiovascular, pulmonary, GI, , musculoskeletal, neuro, skin, endocrine and psych systems are negative, except as otherwise noted.      Objective    /72 (BP Location: Right arm, Patient Position: Sitting, Cuff Size: Adult Large)   Pulse 87   Temp 98.3  F (36.8  C) (Tympanic)   Resp 16   Ht 1.702 m (5' 7\")   Wt 84.7 kg (186 lb 12.8 oz)   " SpO2 97%   BMI 29.26 kg/m    Body mass index is 29.26 kg/m .  Physical Exam   GENERAL: alert and no distress  PSYCH: mentation appears normal, affect normal/bright    Orders Placed This Encounter   Procedures    INFLUENZA VACCINE,SPLIT VIRUS,TRIVALENT,PF(FLUZONE)    COVID-19 12+ (PFIZER)    Adult Urology  Referral            Signed Electronically by: Kole Gomez MD

## 2025-01-02 NOTE — TELEPHONE ENCOUNTER
Patient answered the phone saying he was being disconnected from dialysis and could not hear incoming caller, but to call back again or tomorrow and he would answer.

## 2025-01-08 NOTE — TELEPHONE ENCOUNTER
MEDICAL RECORDS REQUEST   [facility]  Urology Clinic  [address] [place] [address]  PHONE: [phone]  Fax: [phone]        FUTURE VISIT INFORMATION                                                   Dax Villareal : 1990 scheduled for future visit at [facility]    APPOINTMENT INFORMATION:  Date: 2025  Provider:  Thierno Ho MD  Reason for Visit/Diagnosis: erectile dysfunction    REFERRAL INFORMATION:  Referring provider:   [facility]      RECORDS REQUESTED FOR VISIT                                                     NOTES  STATUS/DETAILS   OFFICE NOTE from referring provider  yes, 2024 --  Kole [facility]   MEDICATION LIST  yes     PRE-VISIT CHECKLIST      Joint diagnostic appointment coordinated correctly          (ensure right order & amount of time) Yes   RECORD COLLECTION COMPLETE Yes

## 2025-01-14 NOTE — TELEPHONE ENCOUNTER
Called pt regarding referral. Pt self-referred himself back. Reviewed recommendations from our team for re-evaluation from Committee note 2/15/2024. Pt has not yet met our criteria-  He is now in the CADI program, working on getting a CADI worker and has moved to an apartment in [place]. He is not able to name a caregiver at this time.  His brother lives out of town and his sister is in [place]. He is also considering being evaluated in [place] which may make more sense from a support standpoint. He has not established in psychotherapy - per chart review note from 7/2024 states he no-showed x3. He will need to establish with a therapist. He may have upcoming surgery on his feet. We discussed that he should return for re-assessment once he has his caregiver plans sorted out, has established with a mental health provider, and has fully healed from a foot surgery. He was in agreement and took my direct line. He had no further questions. Will end this referral.

## 2025-02-03 ENCOUNTER — TELEPHONE (OUTPATIENT)
Dept: FAMILY MEDICINE | Facility: CLINIC | Age: 35
End: 2025-02-03
Payer: MEDICARE

## 2025-02-03 DIAGNOSIS — N18.6 ESRD (END STAGE RENAL DISEASE) ON DIALYSIS (H): Primary | ICD-10-CM

## 2025-02-03 DIAGNOSIS — Z99.2 ESRD (END STAGE RENAL DISEASE) ON DIALYSIS (H): Primary | ICD-10-CM

## 2025-02-03 NOTE — TELEPHONE ENCOUNTER
"Pt calling to state he is currently outside in the cold going home (11miles distance) on his wheelchair after dialysis due to no transportation. Pt was seen at 0630 during shift change for dialysis and is not satisfied with no transportation. Pt stated \"I'm not getting addequate help in my care plan\" and requesting immediate review for assistance as he is requesting new in home services. Pt stated he has been seeing the same people (Home care) getting help for 3 years and is not satisfied with home care services.     RN offered to place care coordination referral to assist in new home care and transportation. Pt stated he has had issues paying for new home care service teams as they are not covered by insurance. RN informed pt, the CC team can assist in finding coverage if needed as well. Pt agreeable with CC, Care coordination referral placed with pt's OK.     Melyssa Jean, RN on 2/3/2025 at 9:40 AM   "

## 2025-02-04 ENCOUNTER — TELEPHONE (OUTPATIENT)
Dept: TRANSPLANT | Facility: CLINIC | Age: 35
End: 2025-02-04
Payer: MEDICARE

## 2025-02-04 ENCOUNTER — PATIENT OUTREACH (OUTPATIENT)
Dept: CARE COORDINATION | Facility: CLINIC | Age: 35
End: 2025-02-04
Payer: MEDICARE

## 2025-02-04 NOTE — LETTER
[facility] CARE COORDINATION  [address] [address]    February 5, 2025    Dax Villareal  [address]   [place] [address]      Dear Dax,    I am a clinic community health worker who works with Kole Gomez MD with the [facility]. I have been trying to reach you recently to introduce Clinic Care Coordination. Below is a description of clinic care coordination and how I can further assist you.       The clinic care coordination team is made up of a registered nurse, , financial resource worker and community health worker who understand the health care system. The goal of clinic care coordination is to help you manage your health and improve access to the health care system. Our team works alongside your provider to assist you in determining your health and social needs. We can help you obtain health care and community resources, providing you with necessary information and education. We can work with you through any barriers and develop a care plan that helps coordinate and strengthen the communication between you and your care team.  Our services are voluntary and are offered without charge to you personally.    Please feel free to contact Community Health WorkerKarina at [phone] with any questions or concerns regarding care coordination and what we can offer.      We are focused on providing you with the highest-quality healthcare experience possible.    Sincerely,     Dorcas DUENAS  Community Health Worker    Connected Care Resources   [facility]

## 2025-02-04 NOTE — TELEPHONE ENCOUNTER
Patient Call: General  Route to LPN    Reason for call: Loi, a patient advocate, called to speak to RNCC. She states that pt is refusing home care, and would like to discuss his case with an RNCC    Call back needed? Yes    Return Call Needed  Same as documented in contacts section  When to return call?: Same day: Route High Priority

## 2025-02-04 NOTE — PROGRESS NOTES
Clinic Care Coordination Contact  [facility]/Voicemail      Clinical Data: CHW Outreach    Outreach attempted x 1 regarding CCC enrollment.  No option to LVM to leave a message on patient's voicemail with call back information and requested return call. Due to VM is full and not accepting new messages at this time .     Plan: CHW will attempt another outreach to the patient via phone regarding enrollment into CCC in 1-2 business days.    BLANE Escobar  Clinic Care Coordination   [facility]   Phone: [phone]  [email or web address]

## 2025-02-05 NOTE — PROGRESS NOTES
Clinic Care Coordination Contact  [facility]/Voicemail      Clinical Data: CHW Outreach    Outreach attempted x 2 regarding CCC enrollment.  Left message on patient's voicemail with call back information and requested return call.    Plan: Patient has been sent a unreachable letter via [employer] and was provided with CHW contact information if they are interested in accessing Clinic Care Coordination.         Order for Care Management has been closed, no further outreach will be done at this time and patient can be re-referred.    Dorcas Hollis, CLAUDIAW  Clinic Care Coordination   [facility]   Phone: [phone]  [email or web address]

## 2025-02-05 NOTE — TELEPHONE ENCOUNTER
Returned phone call to Loi with [facility] Care. Left a voicemail that this patient is not currently in transplant evaluation and that she should reach out to the provider who ordered home care to provide awareness that he is refusing. Left the transplant office phone number for any questions.

## 2025-02-06 ENCOUNTER — PATIENT OUTREACH (OUTPATIENT)
Dept: CARE COORDINATION | Facility: CLINIC | Age: 35
End: 2025-02-06
Payer: MEDICARE

## 2025-02-06 NOTE — PROGRESS NOTES
CHW received a call from patient's CADI  who is trying to set up services for patient. Patient told her that we could be an advocate for him to meet with these providers in the community.   CHW explained care coordination to the  and that patient is not currently enrolled in [facility] and we are clinic based and most of our work is done telephonically.     Patient has had a bad experience with [facility] workers in the past and therefore it has been hard for him to trust these services and has been creating his own obstacles in getting services in place by declining them.     BLANE Lares [place], [facility] [place] and [facility]  [phone]

## 2025-02-11 ENCOUNTER — APPOINTMENT (OUTPATIENT)
Dept: MRI IMAGING | Facility: CLINIC | Age: 35
End: 2025-02-11
Attending: EMERGENCY MEDICINE
Payer: MEDICARE

## 2025-02-11 ENCOUNTER — HOSPITAL ENCOUNTER (EMERGENCY)
Facility: CLINIC | Age: 35
Discharge: HOME | End: 2025-02-11
Attending: EMERGENCY MEDICINE | Admitting: EMERGENCY MEDICINE
Payer: MEDICARE

## 2025-02-11 ENCOUNTER — NURSE TRIAGE (OUTPATIENT)
Dept: FAMILY MEDICINE | Facility: CLINIC | Age: 35
End: 2025-02-11

## 2025-02-11 VITALS
OXYGEN SATURATION: 98 % | DIASTOLIC BLOOD PRESSURE: 73 MMHG | HEART RATE: 83 BPM | TEMPERATURE: 97.7 F | SYSTOLIC BLOOD PRESSURE: 122 MMHG | RESPIRATION RATE: 16 BRPM

## 2025-02-11 DIAGNOSIS — L05.91 PILONIDAL CYST: ICD-10-CM

## 2025-02-11 DIAGNOSIS — L02.91 ABSCESS: ICD-10-CM

## 2025-02-11 LAB
ANION GAP SERPL CALCULATED.3IONS-SCNC: 16 MMOL/L (ref 7–15)
BASOPHILS # BLD AUTO: 0.1 10E3/UL (ref 0–0.2)
BASOPHILS NFR BLD AUTO: 1 %
BUN SERPL-MCNC: 21.3 MG/DL (ref 6–20)
CALCIUM SERPL-MCNC: 10.3 MG/DL (ref 8.8–10.4)
CHLORIDE SERPL-SCNC: 95 MMOL/L (ref 98–107)
CREAT SERPL-MCNC: 2.41 MG/DL (ref 0.67–1.17)
CRP SERPL-MCNC: 44.1 MG/L
EGFRCR SERPLBLD CKD-EPI 2021: 35 ML/MIN/1.73M2
EOSINOPHIL # BLD AUTO: 0.3 10E3/UL (ref 0–0.7)
EOSINOPHIL NFR BLD AUTO: 5 %
ERYTHROCYTE [DISTWIDTH] IN BLOOD BY AUTOMATED COUNT: 12.8 % (ref 10–15)
ERYTHROCYTE [SEDIMENTATION RATE] IN BLOOD BY WESTERGREN METHOD: 21 MM/HR (ref 0–15)
GLUCOSE SERPL-MCNC: 102 MG/DL (ref 70–99)
HCO3 SERPL-SCNC: 26 MMOL/L (ref 22–29)
HCT VFR BLD AUTO: 43.1 % (ref 40–53)
HGB BLD-MCNC: 15 G/DL (ref 13.3–17.7)
IMM GRANULOCYTES # BLD: 0 10E3/UL
IMM GRANULOCYTES NFR BLD: 0 %
LYMPHOCYTES # BLD AUTO: 1 10E3/UL (ref 0.8–5.3)
LYMPHOCYTES NFR BLD AUTO: 16 %
MCH RBC QN AUTO: 29.4 PG (ref 26.5–33)
MCHC RBC AUTO-ENTMCNC: 34.8 G/DL (ref 31.5–36.5)
MCV RBC AUTO: 85 FL (ref 78–100)
MONOCYTES # BLD AUTO: 0.4 10E3/UL (ref 0–1.3)
MONOCYTES NFR BLD AUTO: 7 %
NEUTROPHILS # BLD AUTO: 4.5 10E3/UL (ref 1.6–8.3)
NEUTROPHILS NFR BLD AUTO: 71 %
NRBC # BLD AUTO: 0 10E3/UL
NRBC BLD AUTO-RTO: 0 /100
PLATELET # BLD AUTO: 214 10E3/UL (ref 150–450)
POTASSIUM SERPL-SCNC: 3.7 MMOL/L (ref 3.4–5.3)
RBC # BLD AUTO: 5.1 10E6/UL (ref 4.4–5.9)
SODIUM SERPL-SCNC: 137 MMOL/L (ref 135–145)
WBC # BLD AUTO: 6.3 10E3/UL (ref 4–11)

## 2025-02-11 PROCEDURE — 85652 RBC SED RATE AUTOMATED: CPT | Performed by: EMERGENCY MEDICINE

## 2025-02-11 PROCEDURE — 99284 EMERGENCY DEPT VISIT MOD MDM: CPT | Mod: 25 | Performed by: EMERGENCY MEDICINE

## 2025-02-11 PROCEDURE — 86140 C-REACTIVE PROTEIN: CPT | Performed by: EMERGENCY MEDICINE

## 2025-02-11 PROCEDURE — 72195 MRI PELVIS W/O DYE: CPT | Mod: 26 | Performed by: RADIOLOGY

## 2025-02-11 PROCEDURE — 72195 MRI PELVIS W/O DYE: CPT

## 2025-02-11 PROCEDURE — 85025 COMPLETE CBC W/AUTO DIFF WBC: CPT | Performed by: EMERGENCY MEDICINE

## 2025-02-11 PROCEDURE — 82435 ASSAY OF BLOOD CHLORIDE: CPT | Performed by: EMERGENCY MEDICINE

## 2025-02-11 PROCEDURE — 10081 I&D PILONIDAL CYST COMP: CPT | Performed by: EMERGENCY MEDICINE

## 2025-02-11 PROCEDURE — 36415 COLL VENOUS BLD VENIPUNCTURE: CPT | Performed by: EMERGENCY MEDICINE

## 2025-02-11 PROCEDURE — 99285 EMERGENCY DEPT VISIT HI MDM: CPT | Mod: 25 | Performed by: EMERGENCY MEDICINE

## 2025-02-11 PROCEDURE — 250N000013 HC RX MED GY IP 250 OP 250 PS 637: Performed by: EMERGENCY MEDICINE

## 2025-02-11 PROCEDURE — 250N000011 HC RX IP 250 OP 636: Performed by: EMERGENCY MEDICINE

## 2025-02-11 PROCEDURE — 72148 MRI LUMBAR SPINE W/O DYE: CPT | Mod: 26 | Performed by: RADIOLOGY

## 2025-02-11 PROCEDURE — 72148 MRI LUMBAR SPINE W/O DYE: CPT

## 2025-02-11 RX ORDER — SULFAMETHOXAZOLE AND TRIMETHOPRIM 800; 160 MG/1; MG/1
1 TABLET ORAL 2 TIMES DAILY
Qty: 10 TABLET | Refills: 0 | Status: SHIPPED | OUTPATIENT
Start: 2025-02-11 | End: 2025-02-16

## 2025-02-11 RX ORDER — SULFAMETHOXAZOLE AND TRIMETHOPRIM 800; 160 MG/1; MG/1
1 TABLET ORAL ONCE
Status: COMPLETED | OUTPATIENT
Start: 2025-02-11 | End: 2025-02-11

## 2025-02-11 RX ORDER — LIDOCAINE HYDROCHLORIDE AND EPINEPHRINE 10; 10 MG/ML; UG/ML
10 INJECTION, SOLUTION INFILTRATION; PERINEURAL ONCE
Status: COMPLETED | OUTPATIENT
Start: 2025-02-11 | End: 2025-02-11

## 2025-02-11 RX ADMIN — SULFAMETHOXAZOLE AND TRIMETHOPRIM 1 TABLET: 800; 160 TABLET ORAL at 19:00

## 2025-02-11 RX ADMIN — LIDOCAINE HYDROCHLORIDE AND EPINEPHRINE 10 ML: 10; 10 INJECTION, SOLUTION INFILTRATION; PERINEURAL at 19:02

## 2025-02-11 ASSESSMENT — COLUMBIA-SUICIDE SEVERITY RATING SCALE - C-SSRS
2. HAVE YOU ACTUALLY HAD ANY THOUGHTS OF KILLING YOURSELF IN THE PAST MONTH?: NO
1. IN THE PAST MONTH, HAVE YOU WISHED YOU WERE DEAD OR WISHED YOU COULD GO TO SLEEP AND NOT WAKE UP?: NO
6. HAVE YOU EVER DONE ANYTHING, STARTED TO DO ANYTHING, OR PREPARED TO DO ANYTHING TO END YOUR LIFE?: NO

## 2025-02-11 ASSESSMENT — ACTIVITIES OF DAILY LIVING (ADL)
ADLS_ACUITY_SCORE: 59

## 2025-02-11 NOTE — ED PROVIDER NOTES
ED Provider Note  [facility]      History     Chief Complaint   Patient presents with    Back Pain     HPI  Dax Villareal is a 34 year old male with a history of lumbar radiculopathy, ESRD on dialysis, pancreatic insufficiency, and recurrent pleural effusion who presents to the emergency department with back pain.  The patient reports that he recently had an injection in the lumbosacral spine for pain.  He has a history of bilateral lumbar radiculopathy.  He states that his symptoms initially were improving but then over the last 1 to 2 weeks has had increased pressure and pain in his low back where they did the injection.  He states that he has a history of a abscess in this area that he had drained so was concerned that was going on today.  He does report baseline intermittent fevers which are unchanged.  He does feel it has been difficult to urinate; he is on dialysis Monday Wednesday Friday but does still produce some urine.    Past Medical History  Past Medical History:   Diagnosis Date    ADHD 2008    Alcohol abuse, in remission     Alcoholic cirrhosis of liver with ascites (H)     Anemia in chronic kidney disease     Chronic pain     Current smoker     End stage renal disease (H)     Hypotension, unspecified hypotension type     Malnutrition     Metabolic acidosis     Necrotizing pancreatitis     Pancreatic insufficiency     Pericarditis     Recurrent pleural effusion      Past Surgical History:   Procedure Laterality Date    AV FISTULA PLACEMENT, BRACHIOCEPHALIC Left 05/11/2022    [facility]    COMBINED ESOPHAGOSCOPY, GASTROSCOPY, DUODENOSCOPY (EGD), TISSUE APPOSI N/A 09/20/2021    Procedure: ESOPHAGOGASTRODUODENOSCOPY WITH SUTURE FISTULA CLOSURE, argon plasma coagulation;  Surgeon: Jose Quigley MD;  Location: [facility] OR    ENDOSCOPIC INSERTION TUBE GASTROSTOMY N/A 07/13/2021    Procedure: Upper endoscopy, INSERTION, PEG-J TUBE and Gastropexy;  Surgeon: Rayshawn Will MD;   Location: [facility]    ENDOSCOPIC INSERTION TUBE GASTROSTOMY  09/13/2021    Procedure: PEG/J PLACEMENT.;  Surgeon: Rayshawn Will MD;  Location: [facility]    ENDOSCOPIC ULTRASOUND UPPER GASTROINTESTINAL TRACT (GI) N/A 07/21/2021    Procedure: ENDOSCOPIC ULTRASOUND, ESOPHAGOSCOPY / UPPER GASTROINTESTINAL TRACT (GI) with cyst gastrostomy, dilation;  Surgeon: Guru Yecenia Chacko MD;  Location: [facility]    ENDOSCOPIC ULTRASOUND, ESOPHAGOSCOPY, GASTROSCOPY, DUODENOSCOPY (EGD), NECROSECTOMY N/A 08/11/2021    Procedure: ESOPHAGOGASTRODUODENOSCOPY (EGD) with necrosectomy, stent exchange.;  Surgeon: Amadou Beasley MD;  Location: [facility]    ESOPHAGOSCOPY, GASTROSCOPY, DUODENOSCOPY (EGD), COMBINED N/A 07/28/2021    Procedure: ESOPHAGOGASTRODUODENOSCOPY (EGD), gastrostomy apposition, Necrosectomy, stent exchange.;  Surgeon: Rayshawn Will MD;  Location: [facility]    ESOPHAGOSCOPY, GASTROSCOPY, DUODENOSCOPY (EGD), COMBINED N/A 08/06/2021    Procedure: ESOPHAGOGASTRODUODENOSCOPY (EGD) with necrosectomy, and stents exchanged;  Surgeon: Jose Quigley MD;  Location: [facility]    ESOPHAGOSCOPY, GASTROSCOPY, DUODENOSCOPY (EGD), COMBINED Left 09/13/2021    Procedure: ESOPHAGOGASTRODUODENOSCOPY (EGD), GASTROSTOMY TUBE REMOVAL, FISTULAR CLOSURE ENDOSCOPIC.;  Surgeon: Rayshawn Will MD;  Location: [facility]    IR ABSCESS TUBE CHANGE  07/14/2021    IR NG TUBE PLACEMENT REQ RAD & FLUORO  10/26/2021    IR PARACENTESIS  07/21/2021    IR SINOGRAM INJECTION DIAGNOSTIC  08/25/2021    IR THORACENTESIS  02/05/2022    ORTHOPEDIC SURGERY Right     fracture repair    REPLACE GASTROSTOMY TUBE, PERCUTANEOUS N/A 07/28/2021    Procedure: gastro-jejunostomy tube exchange;  Surgeon: Rayshawn Will MD;  Location: [facility]     sulfamethoxazole-trimethoprim (BACTRIM DS) 800-160 MG tablet  [START ON 3/28/2025] amphetamine-dextroamphetamine (ADDERALL XR) 30 MG 24 hr capsule  amphetamine-dextroamphetamine (ADDERALL) 30 MG  "tablet  [START ON 2/28/2025] amphetamine-dextroamphetamine (ADDERALL) 30 MG tablet  amphetamine-dextroamphetamine (ADDERALL) 30 MG tablet  baclofen (LIORESAL) 10 MG tablet  buprenorphine (BUTRANS) 5 MCG/HR WK patch  ciclopirox (LOPROX) 0.77 % cream  hydrOXYzine HCl (ATARAX) 25 MG tablet  lidocaine (XYLOCAINE) 5 % external ointment  lipase-protease-amylase (CREON 24) 46263-40628-600372 units CPEP per EC capsule  midodrine (PROAMATINE) 10 MG tablet  oxyCODONE (ROXICODONE) 5 MG tablet  pantoprazole (PROTONIX) 40 MG EC tablet  pregabalin (LYRICA) 300 MG capsule  prochlorperazine (COMPAZINE) 5 MG tablet  propranolol (INDERAL) 10 MG tablet  sevelamer carbonate, RENVELA, 0.8 GM PACK Packet  silver nitrate (ARZOL) 75-25 % miscellaneous  torsemide (DEMADEX) 20 MG tablet      No Known Allergies  Family History  Family History   Problem Relation Age of Onset    Attention Deficit Disorder Mother     Alcoholism Mother     Cancer Mother     Alcoholism Father     Alcoholism Sister     Alcoholism Brother      Social History   Social History     Tobacco Use    Smoking status: Every Day     Current packs/day: 0.25     Types: Cigarettes     Passive exposure: Current    Smokeless tobacco: Never    Tobacco comments:     10/22/24 more or less around half a pack of cigs/day.          8/24/2021 Patient did not want to discuss quitting but would like 4 mg lozenge to use during admission. Patient also accepted \"Quitting for Good\" workbook   Vaping Use    Vaping status: Never Used    Passive vaping exposure: Yes   Substance Use Topics    Alcohol use: Not Currently     Comment: \"no alcohol for over 1 year ago\" per pt on 5/12/22    Drug use: No      A medically appropriate review of systems was performed with pertinent positives and negatives noted in the HPI, and all other systems negative.    Physical Exam   BP: 122/73  Pulse: 83  Temp: 97.7  F (36.5  C)  Resp: 16  SpO2: 98 %  Physical Exam  Constitutional:       General: He is not in acute " "distress.     Appearance: He is not toxic-appearing.   HENT:      Head: Normocephalic and atraumatic.      Nose: Nose normal.      Mouth/Throat:      Mouth: Mucous membranes are moist.      Pharynx: Oropharynx is clear.   Eyes:      Conjunctiva/sclera: Conjunctivae normal.      Pupils: Pupils are equal, round, and reactive to light.   Cardiovascular:      Rate and Rhythm: Normal rate and regular rhythm.      Heart sounds: No murmur heard.  Pulmonary:      Effort: Pulmonary effort is normal. No respiratory distress.      Breath sounds: No wheezing.   Genitourinary:     Comments: Subcutaneous cyst to the superior aspect of the gluteal cleft with some overlying erythema, induration  Musculoskeletal:         General: Normal range of motion.   Skin:     General: Skin is warm and dry.   Neurological:      General: No focal deficit present.      Mental Status: He is alert and oriented to person, place, and time.           ED Course, Procedures, & Data      [facility]    PROCEDURE: -Incision/Drainage    Date/Time: 2/11/2025 6:56 PM    Performed by: Hebert Fontana MD  Authorized by: Hebert Fontana MD    Risks, benefits and alternatives discussed.      LOCATION:      Type:  Abscess    Size:  ~3cm    Location:  Anogenital    Anogenital location:  Gluteal cleft    PRE-PROCEDURE DETAILS:     Skin preparation:  Chloraprep    PROCEDURE TYPE:     Complexity:  Complex    ANESTHESIA (see MAR for exact dosages):     Anesthesia method:  Local infiltration    Local anesthetic:  Lidocaine 1% WITH epi    PROCEDURE DETAILS:     Incision types:  Single straight    Incision depth:  Subcutaneous    Scalpel blade:  11    Wound management:  Probed and deloculated and irrigated with saline    Drainage:  Purulent and bloody    Drainage amount:  Moderate    Wound treatment:  Wound left open    Packing materials:  1/4 in iodoform gauze    Amount 1/4\" iodoform:  ~6cm    PROCEDURE    Patient " Tolerance:  Patient tolerated the procedure well with no immediate complications                  Results for orders placed or performed during the hospital encounter of 02/11/25   Lumbar spine MRI w/o contrast     Status: None    Narrative    EXAM: MR LUMBAR SPINE W/O CONTRAST  2/11/2025 4:55 PM     HISTORY: history of recent spinal injection, now with cyst over site  in lumbosacral area       COMPARISON: Same-day lumbosacral spine MRI and 9/13/2021 CT  abdomen/pelvis.    TECHNIQUE: Multiplanar, multisequence MR images of the lumbar spine  were obtained without intravenous contrast..    CONTRAST: None.    FINDINGS:  Counting down from the lowest rib-bearing vertebral segment, there are  5 lumbar-type vertebra. There is transitional anatomy at the  lumbosacral junction, with lumbarization of the S1 vertebral segment,  which demonstrates pseudoarticulation of the right transverse process  and the sacrum. Mild anterior wedging of the L1 vertebral segment,  which is unchanged compared to 9/2/2021 Normal lumbar lordosis. No  signal changes suggesting acute fracture or traumatic subluxation. No  suspicious marrow lesion. Conus tip at approximately L1. Normal cauda  equina. Nonfocal extraspinal structures. Mild disc desiccation of the  L5-S1 intervertebral disc. T1 and T2 hyperintense focus within the L5  vertebral segment, suggesting benign process such as cavernous  hemangioma.    On a level by level basis, the findings are as follows:    L1-L2: No substantial degenerative disc or facet disease. No  significant spinal canal or neural foraminal stenosis.    L2-L3: No substantial degenerative disc or facet disease. No  significant spinal canal or neural foraminal stenosis.    L3-L4: No substantial degenerative disc or facet disease. No  significant spinal canal or neural foraminal stenosis.    L4-L5: No substantial degenerative disc or facet disease. No  significant spinal canal or neural foraminal stenosis    L5-S1: Mild  symmetric disc bulge with superimposed right paracentral  disc protrusion with fissuring at the posterior annulus significant  degenerative facet joint disease. No significant spinal canal  stenosis. Trace bilateral neural foraminal narrowing. Mild narrowing  of the right lateral recess.  .      Impression    IMPRESSION:  1. No acute fractures or traumatic subluxations.  2. Transitional anatomy of the lumbosacral junction with near complete  lumbarization of the S1 vertebral segment.  3. No severe spinal canal or neural foraminal stenosis. Degenerative  changes most pronounced at L5-S1 with trace bilateral neural foraminal  narrowing and mild narrowing of the right lateral recess at this  level.  3. No obvious cyst in the lumbosacral area.    I have personally reviewed the examination and initial interpretation  and I agree with the findings.    LADY RATLIFF MD         SYSTEM ID:  [id number]   MR Sacrum and Coccyx w/o Contrast     Status: None    Narrative    EXAM: MR SACRUM AND COCCYX W/O CONTRAST  LOCATION: [facility]  DATE: 2/11/2025    INDICATION: history of recent spinal injection, now with cyst over site in lumbosacral area  COMPARISON: None.   TECHNIQUE: Unenhanced.    FINDINGS:     SI JOINTS AND BONES:  -No fracture or bone marrow edema. Mild L4-L5 disc space narrowing and disc desiccation; there is a posterior disc bulge with an apparent annular fissure. No sacroiliac joint effusion, synovitis, or erosive change.    SOFT TISSUES:  -There is a complex fluid collection posterior to the sacrococcygeal junction which measures 1.2 x 1.6 x 2.6 cm. There is significant surrounding soft tissue edema.    MUSCLES:  -Muscle bulk is normal.      Impression    IMPRESSION:  1.  There is a 2.6 cm complex fluid collection posterior to the sacrococcygeal junction, with significant surrounding soft tissue edema. These findings are most concerning for an abscess or hematoma.  This would be amenable to ultrasound-guided aspiration   if clinically indicated.  2.  No acute osseous abnormality. Specifically, there is normal marrow signal in the adjacent sacrum and coccyx.  3.  Mild L4-L5 degenerative disc disease.     Basic metabolic panel     Status: Abnormal   Result Value Ref Range    Sodium 137 135 - 145 mmol/L    Potassium 3.7 3.4 - 5.3 mmol/L    Chloride 95 (L) 98 - 107 mmol/L    Carbon Dioxide (CO2) 26 22 - 29 mmol/L    Anion Gap 16 (H) 7 - 15 mmol/L    Urea Nitrogen 21.3 (H) 6.0 - 20.0 mg/dL    Creatinine 2.41 (H) 0.67 - 1.17 mg/dL    GFR Estimate 35 (L) >60 mL/min/1.73m2    Calcium 10.3 8.8 - 10.4 mg/dL    Glucose 102 (H) 70 - 99 mg/dL   Erythrocyte sedimentation rate auto     Status: Abnormal   Result Value Ref Range    Erythrocyte Sedimentation Rate 21 (H) 0 - 15 mm/hr   CRP inflammation     Status: Abnormal   Result Value Ref Range    CRP Inflammation 44.10 (H) <5.00 mg/L   CBC with platelets and differential     Status: None   Result Value Ref Range    WBC Count 6.3 4.0 - 11.0 10e3/uL    RBC Count 5.10 4.40 - 5.90 10e6/uL    Hemoglobin 15.0 13.3 - 17.7 g/dL    Hematocrit 43.1 40.0 - 53.0 %    MCV 85 78 - 100 fL    MCH 29.4 26.5 - 33.0 pg    MCHC 34.8 31.5 - 36.5 g/dL    RDW 12.8 10.0 - 15.0 %    Platelet Count 214 150 - 450 10e3/uL    % Neutrophils 71 %    % Lymphocytes 16 %    % Monocytes 7 %    % Eosinophils 5 %    % Basophils 1 %    % Immature Granulocytes 0 %    NRBCs per 100 WBC 0 <1 /100    Absolute Neutrophils 4.5 1.6 - 8.3 10e3/uL    Absolute Lymphocytes 1.0 0.8 - 5.3 10e3/uL    Absolute Monocytes 0.4 0.0 - 1.3 10e3/uL    Absolute Eosinophils 0.3 0.0 - 0.7 10e3/uL    Absolute Basophils 0.1 0.0 - 0.2 10e3/uL    Absolute Immature Granulocytes 0.0 <=0.4 10e3/uL    Absolute NRBCs 0.0 10e3/uL   CBC with platelets differential     Status: None    Narrative    The following orders were created for panel order CBC with platelets differential.  Procedure                                Abnormality         Status                     ---------                               -----------         ------                     CBC with platelets and d...[[id number]]                      Final result                 Please view results for these tests on the individual orders.     Medications   lidocaine 1% with EPINEPHrine 1:100,000 injection 10 mL (has no administration in time range)   sulfamethoxazole-trimethoprim (BACTRIM DS) 800-160 MG per tablet 1 tablet (1 tablet Oral $Given 2/11/25 1900)     Labs Ordered and Resulted from Time of ED Arrival to Time of ED Departure   BASIC METABOLIC PANEL - Abnormal       Result Value    Sodium 137      Potassium 3.7      Chloride 95 (*)     Carbon Dioxide (CO2) 26      Anion Gap 16 (*)     Urea Nitrogen 21.3 (*)     Creatinine 2.41 (*)     GFR Estimate 35 (*)     Calcium 10.3      Glucose 102 (*)    ERYTHROCYTE SEDIMENTATION RATE AUTO - Abnormal    Erythrocyte Sedimentation Rate 21 (*)    CRP INFLAMMATION - Abnormal    CRP Inflammation 44.10 (*)    CBC WITH PLATELETS AND DIFFERENTIAL    WBC Count 6.3      RBC Count 5.10      Hemoglobin 15.0      Hematocrit 43.1      MCV 85      MCH 29.4      MCHC 34.8      RDW 12.8      Platelet Count 214      % Neutrophils 71      % Lymphocytes 16      % Monocytes 7      % Eosinophils 5      % Basophils 1      % Immature Granulocytes 0      NRBCs per 100 WBC 0      Absolute Neutrophils 4.5      Absolute Lymphocytes 1.0      Absolute Monocytes 0.4      Absolute Eosinophils 0.3      Absolute Basophils 0.1      Absolute Immature Granulocytes 0.0      Absolute NRBCs 0.0       MR Sacrum and Coccyx w/o Contrast   Final Result   IMPRESSION:   1.  There is a 2.6 cm complex fluid collection posterior to the sacrococcygeal junction, with significant surrounding soft tissue edema. These findings are most concerning for an abscess or hematoma. This would be amenable to ultrasound-guided aspiration    if clinically indicated.   2.  No acute  osseous abnormality. Specifically, there is normal marrow signal in the adjacent sacrum and coccyx.   3.  Mild L4-L5 degenerative disc disease.         Lumbar spine MRI w/o contrast   Final Result   IMPRESSION:   1. No acute fractures or traumatic subluxations.   2. Transitional anatomy of the lumbosacral junction with near complete   lumbarization of the S1 vertebral segment.   3. No severe spinal canal or neural foraminal stenosis. Degenerative   changes most pronounced at L5-S1 with trace bilateral neural foraminal   narrowing and mild narrowing of the right lateral recess at this   level.   3. No obvious cyst in the lumbosacral area.      I have personally reviewed the examination and initial interpretation   and I agree with the findings.      LADY RATLIFF MD            SYSTEM ID:  [id number]             Critical care was not performed.     Medical Decision Making  The patient's presentation was of high complexity (an acute health issue posing potential threat to life or bodily function).    The patient's evaluation involved:  review of external note(s) from 3+ sources (nursing notes, telephone encounters, clinic notes)  review of 2 test result(s) ordered prior to this encounter (cbc, bmp)  ordering and/or review of 3+ test(s) in this encounter (see separate area of note for details)  discussion of management or test interpretation with another health professional (see separate area of note for details)    The patient's management necessitated moderate risk (prescription drug management including medications given in the ED).    Assessment & Plan    This is a 34-year-old male presenting with pain to his low back.  Symptoms gluteal cleft.  He was overall well-appearing and vitals are reassuring.  Differential included but not limited to pilonidal cyst or abscess, postsurgical complication of his prior lumbosacral injection.  Given his recent procedure labs were obtained as well as MRI.  Labs notable for  elevated inflammatory markers but no leukocytosis.  MRIs were obtained and showed presence of a likely pilonidal abscess but no acute findings to the spinal cord itself. I did discuss these with radiology and there was no evidence of a tract or fistula as well. A I&D was done.  He was started on Bactrim given the apparent cellulitis overlying erythema concerning for purulent cellulitis.  He received a dose here and was given a prescription for further antibiotics.  It appears that he does have a history of prior pilonidal abscesses.  Answer referral was also provided for general surgery for follow-up.  He was agreeable with this plan.  Return precautions were discussed and all questions answered.    I have reviewed the nursing notes. I have reviewed the findings, diagnosis, plan and need for follow up with the patient.    New Prescriptions    SULFAMETHOXAZOLE-TRIMETHOPRIM (BACTRIM DS) 800-160 MG TABLET    Take 1 tablet by mouth 2 times daily for 5 days.       Final diagnoses:   Pilonidal cyst         [facility] EMERGENCY DEPARTMENT  2/11/2025     Hebert Fontana MD  02/11/25 [id number]

## 2025-02-11 NOTE — ED TRIAGE NOTES
Pt ambulatory to ED with complaints of low back pain. Pt reports he got a pain injection in December in the low back area and pain started recently after injection. Site is  CDI. Pt denies any fevers or neuro symptoms.     /73   Pulse 83   Temp 97.7  F (36.5  C) (Oral)   Resp 16   SpO2 98%     Hx: VRE, ESRD (dialysis M, W, Fri), pancreatitis,      Triage Assessment (Adult)       Row Name 02/11/25 1200          Triage Assessment    Airway WDL WDL        Respiratory WDL    Respiratory WDL WDL        Skin Circulation/Temperature WDL    Skin Circulation/Temperature WDL WDL        Cardiac WDL    Cardiac WDL WDL        Peripheral/Neurovascular WDL    Peripheral Neurovascular WDL WDL        Cognitive/Neuro/Behavioral WDL    Cognitive/Neuro/Behavioral WDL WDL

## 2025-02-11 NOTE — TELEPHONE ENCOUNTER
Patient calling in stating that he is worried about some pressure/pain he is having in his lower spine right above tailbone. Patient states he states sx feels similar to when he had a past staph infection/MRSA. RN asked about any redness in the area/open wounds. Patient reports he cannot really see in that area.     He states new pressure pain he noticed approx a couple days ago rates 5/10. He is on dialysis and worried about any infection. He also received an injection at [facility] in same area 12/2024. Unsure if any over use states he does his own ADL's but is supposed to have PCA but does not have one.     RN did state patient could be seen in UC and offered office visit. Transportation is difficult for patient and he states usually rides need to be arranged in advance and he already has ride set up for appointments tomorrow and today. He states he will be down at the [facility] already today for an appointment. RN asked if there is UC there or somewhere he could be seen. Patient states there is an ER there but unsure of UC. RN stated if transportation is difficult and worried about his sx he could be seen in ER.     Patient verbalized agreement. RN did discuss different  UC locations but none were in locations that were favorable for patient due to transportation needs.     RN instructed patient to call back if wanting appointment or needing further advice.       Mirna Bearden RN on 2/11/2025 at 9:11 AM            Additional Information   Negative: Passed out (e.g., fainted, lost consciousness, blacked out and was not responding)   Negative: Shock suspected (e.g., cold/pale/clammy skin, too weak to stand, low BP, rapid pulse)   Negative: Sounds like a life-threatening emergency to the triager   Negative: Major injury to the back (e.g., MVA, fall > 10 feet or 3 meters, penetrating injury, etc.)   Negative: Pain in the upper back over the ribs (rib cage) that radiates (travels) into the  chest   Negative: Pain in the upper back over the ribs (rib cage) and worsened by coughing (or clearly increases with breathing)   Negative: Back pain during pregnancy   Negative: SEVERE back pain of sudden onset and age > 60 years   Negative: SEVERE abdominal pain (e.g., excruciating)   Negative: Abdominal pain and age > 60 years   Negative: Unable to urinate (or only a few drops) and bladder feels very full   Negative: Loss of bladder or bowel control (urine or bowel incontinence; wetting self, leaking stool) of new-onset   Negative: Numbness (loss of sensation) in groin or rectal area   Negative: Pain radiates into groin, scrotum   Negative: Blood in urine (red, pink, or tea-colored)   Negative: Vomiting and pain over lower ribs of back (i.e., flank - kidney area)   Negative: Weakness of a leg or foot (e.g., unable to bear weight, dragging foot)   Negative: Patient sounds very sick or weak to the triager   Negative: Fever > 100.4 F (38.0 C) and flank pain   Negative: Pain or burning with passing urine (urination)   Negative: MODERATE back pain (e.g., interferes with normal activities) and present > 3 days   Negative: Pain radiates into the thigh or further down the leg   Negative: Patient wants to be seen   Negative: SEVERE back pain (e.g., excruciating, unable to do any normal activities) and not improved after pain medicine and CARE ADVICE   Negative: Numbness in an arm or hand (i.e., loss of sensation) and upper back pain   Negative: Numbness in a leg or foot (i.e., loss of sensation)   Negative: High-risk adult (e.g., history of cancer, history of HIV, or history of IV Drug Use)   Negative: Soft tissue infection (e.g., abscess, cellulitis) or other serious infection (e.g., bacteremia) in last 2 weeks   Negative: Painful rash with multiple small blisters grouped together (i.e., dermatomal distribution or 'band' or 'stripe')   Negative: Pain radiates into the thigh or further down the leg, and in both legs    "Negative: Age > 50 and no history of prior similar back pain    Answer Assessment - Initial Assessment Questions  1. ONSET: \"When did the pain begin?\" (e.g., minutes, hours, days)      Noticing pressure on lower spin near tailbone states feels similar to previous staph infection, noticed pressure and different pain changing a couple days ago  2. LOCATION: \"Where does it hurt?\" (upper, mid or lower back)      Lower spine above tailbone   3. SEVERITY: \"How bad is the pain?\"  (e.g., Scale 1-10; mild, moderate, or severe)      4-5/10 but has chronic pain   4. PATTERN: \"Is the pain constant?\" (e.g., yes, no; constant, intermittent)       Pressure/pain gets a little better with standing and when on side it is better   5. RADIATION: \"Does the pain shoot into your legs or somewhere else?\"      NO   6. CAUSE:  \"What do you think is causing the back pain?\"       Unsure, similar to when had previous staph infection, states he had injection done in same place in December 7. BACK OVERUSE:  \"Any recent lifting of heavy objects, strenuous work or exercise?\"      Possibly does all ADL's by himself, is supposed to have a PCA helping him but does not have one.   8. MEDICINES: \"What have you taken so far for the pain?\" (e.g., nothing, acetaminophen, NSAIDS)      Patient states he has been keeping area clean, has oxycodone and patches takes as prescribed but has not tired heat/ice or OTC meds,   9. NEUROLOGIC SYMPTOMS: \"Do you have any weakness, numbness, or problems with bowel/bladder control?\"      Patient denies any new sx of this   10. OTHER SYMPTOMS: \"Do you have any other symptoms?\" (e.g., fever, abdomen pain, burning with urination, blood in urine)        Patient denies   11. PREGNANCY: \"Is there any chance you are pregnant?\" \"When was your last menstrual period?\"        No    Protocols used: Back Pain-A-[place]    "

## 2025-02-12 ENCOUNTER — PATIENT OUTREACH (OUTPATIENT)
Dept: FAMILY MEDICINE | Facility: CLINIC | Age: 35
End: 2025-02-12
Payer: MEDICARE

## 2025-02-12 NOTE — TELEPHONE ENCOUNTER
Transitions of Care Outreach  Chief Complaint   Patient presents with    Hospital F/U       Most Recent Admission Date: 2/11/2025   Most Recent Admission Diagnosis:      Most Recent Discharge Date: 2/11/2025   Most Recent Discharge Diagnosis: Pilonidal cyst - L05.91  Abscess - L02.91     Transitions of Care Assessment    Discharge Assessment  How are you doing now that you are home?: unchanged. they drained it  How are your symptoms? (Red Flag symptoms escalate to triage hotline per guidelines): Unchanged  Do you know how to contact your clinic care team if you have future questions or changes to your health status? : Yes  Does the patient have their discharge instructions? : Yes  Does the patient have questions regarding their discharge instructions? : No  Were you started on any new medications or were there changes to any of your previous medications? : Yes  Does the patient have all of their medications?: Yes  Do you have questions regarding any of your medications? : No  Do you have all of your needed medical supplies or equipment (DME)?  (i.e. oxygen tank, CPAP, cane, etc.): Yes    Follow up Plan     Discharge Follow-Up  Discharge follow up appointment scheduled in alignment with recommended follow up timeframe or Transitions of Risk Category? (Low = within 30 days; Moderate= within 14 days; High= within 7 days): Yes  Discharge Follow Up Appointment Date: 02/18/25  Discharge Follow Up Appointment Scheduled with?: Primary Care Provider    Future Appointments   Date Time Provider Department Center   2/13/2025 10:20 AM Thierno Ho MD [facility]   2/18/2025  1:00 PM Kole Gomez MD [facility]       Outpatient Plan as outlined on AVS reviewed with patient.    For any urgent concerns, please contact our 24 hour nurse triage line: [phone] ([phone])       Berenice HUNG RN

## 2025-02-12 NOTE — DISCHARGE INSTRUCTIONS
You can remove the packing material from the wound approximately 1 cm each day.  Keep the wound clean and dry.  You should change the gauze at least every day or if the gauze gets dirty or soiled.  I did place a referral for you to follow-up with general surgery as you have had these before.  You should also follow-up with your primary care doctor.  If you have worsening pain, redness, swelling, or other new symptoms or concerns please return to the emergency room.

## 2025-02-12 NOTE — TELEPHONE ENCOUNTER
Hospital Follow up    ED dates: 2/11/25    Dx: Pilonidal cyst, abscess    Recommended F/U: Schedule an appointment with Kole Gmoez MD (Family Medicine)     DAMASO Arenas  [place] Triage RN  [facility]

## 2025-02-13 ENCOUNTER — PRE VISIT (OUTPATIENT)
Dept: UROLOGY | Facility: CLINIC | Age: 35
End: 2025-02-13

## 2025-02-13 ENCOUNTER — TELEPHONE (OUTPATIENT)
Dept: FAMILY MEDICINE | Facility: CLINIC | Age: 35
End: 2025-02-13

## 2025-02-13 ENCOUNTER — TELEPHONE (OUTPATIENT)
Dept: SURGERY | Facility: CLINIC | Age: 35
End: 2025-02-13

## 2025-02-13 ENCOUNTER — OFFICE VISIT (OUTPATIENT)
Dept: SURGERY | Facility: CLINIC | Age: 35
End: 2025-02-13
Attending: EMERGENCY MEDICINE
Payer: MEDICARE

## 2025-02-13 VITALS
BODY MASS INDEX: 27.47 KG/M2 | TEMPERATURE: 96.7 F | WEIGHT: 175 LBS | SYSTOLIC BLOOD PRESSURE: 118 MMHG | DIASTOLIC BLOOD PRESSURE: 82 MMHG | HEIGHT: 67 IN

## 2025-02-13 DIAGNOSIS — L05.91 PILONIDAL CYST: ICD-10-CM

## 2025-02-13 DIAGNOSIS — N18.6 ESRD (END STAGE RENAL DISEASE) ON DIALYSIS (H): ICD-10-CM

## 2025-02-13 DIAGNOSIS — Z59.82 TRANSPORTATION INSECURITY: Primary | ICD-10-CM

## 2025-02-13 DIAGNOSIS — L05.01 PILONIDAL ABSCESS: Primary | ICD-10-CM

## 2025-02-13 DIAGNOSIS — Z99.2 ESRD (END STAGE RENAL DISEASE) ON DIALYSIS (H): ICD-10-CM

## 2025-02-13 SDOH — ECONOMIC STABILITY - TRANSPORTATION SECURITY: TRANSPORTATION INSECURITY: Z59.82

## 2025-02-13 ASSESSMENT — PAIN SCALES - GENERAL: PAINLEVEL_OUTOF10: NO PAIN (0)

## 2025-02-13 NOTE — TELEPHONE ENCOUNTER
Patient calling back    He said he was never assigned a CADI  and is frustrated with them.    He would like to meet with SW with [facility]    RN gave him general surgery referral number for drain management, but he would really like to see PCP today to look at wound/gauze dressing. RN scheduled visit for today with PCP    DAMASO Arenas Triage RN  [facility]

## 2025-02-13 NOTE — TELEPHONE ENCOUNTER
[facility] Call Center    Phone Message    May a detailed message be left on voicemail: yes     Reason for Call: Other: Patient called and schedule appt today, 2/13/25, at 11:15 with ; DX: Pilonidal cyst  and records in [facility]>     Action Taken: Message routed to:  [facility] ([facility] Patient Care Specialty Pool    Travel Screening: Not Applicable

## 2025-02-13 NOTE — LETTER
2/13/2025      Dax Villareal  [address]  [place] [address]      Dear Colleague,    Thank you for referring your patient, Dax Villareal, to the [facility]. Please see a copy of my visit note below.    Consult requested by Dr. Gomez      Reason for consultation pilonidal cyst and abscess    Patient is a 34-year-old white male with ESRD on HD who developed a painful sacrum earlier this week.  He had an incision and drainage done in the ER Tuesday and was found to have a pilonidal abscess.  He also did have a recent spine injection for chronic back pain but the injection site was not near this abscess.  The abscess was drained in the ER.  He does have a history of prior abdominal abscess that was drained about 15 years ago.  He now presents to me for evaluation of his pilonidal cyst and abscess.  He is also making arrangements for possible kidney transplant but would like this treated prior to any transplant.  He was started on antibiotics by the ER.  He now presents to me for evaluation of his pilonidal abscess.    Past Medical History:   Diagnosis Date     ADHD 2008     Alcohol abuse, in remission      Alcoholic cirrhosis of liver with ascites (H)      Anemia in chronic kidney disease      Chronic pain      Current smoker      End stage renal disease (H)      Hypotension, unspecified hypotension type      Malnutrition      Metabolic acidosis      Necrotizing pancreatitis      Pancreatic insufficiency      Pericarditis      Recurrent pleural effusion        Past Surgical History:   Procedure Laterality Date     AV FISTULA PLACEMENT, BRACHIOCEPHALIC Left 05/11/2022    [facility]     COMBINED ESOPHAGOSCOPY, GASTROSCOPY, DUODENOSCOPY (EGD), TISSUE APPOSI N/A 09/20/2021    Procedure: ESOPHAGOGASTRODUODENOSCOPY WITH SUTURE FISTULA CLOSURE, argon plasma coagulation;  Surgeon: Jose Quigley MD;  Location: [facility] OR     ENDOSCOPIC INSERTION TUBE GASTROSTOMY N/A 07/13/2021     Procedure: Upper endoscopy, INSERTION, PEG-J TUBE and Gastropexy;  Surgeon: Rayshawn Will MD;  Location: [facility] OR     ENDOSCOPIC INSERTION TUBE GASTROSTOMY  09/13/2021    Procedure: PEG/J PLACEMENT.;  Surgeon: Rayshawn Will MD;  Location: [facility]     ENDOSCOPIC ULTRASOUND UPPER GASTROINTESTINAL TRACT (GI) N/A 07/21/2021    Procedure: ENDOSCOPIC ULTRASOUND, ESOPHAGOSCOPY / UPPER GASTROINTESTINAL TRACT (GI) with cyst gastrostomy, dilation;  Surgeon: Guru Yecenia Chacko MD;  Location: [facility]     ENDOSCOPIC ULTRASOUND, ESOPHAGOSCOPY, GASTROSCOPY, DUODENOSCOPY (EGD), NECROSECTOMY N/A 08/11/2021    Procedure: ESOPHAGOGASTRODUODENOSCOPY (EGD) with necrosectomy, stent exchange.;  Surgeon: Amadou Beasley MD;  Location: [facility]     ESOPHAGOSCOPY, GASTROSCOPY, DUODENOSCOPY (EGD), COMBINED N/A 07/28/2021    Procedure: ESOPHAGOGASTRODUODENOSCOPY (EGD), gastrostomy apposition, Necrosectomy, stent exchange.;  Surgeon: Rayshawn Will MD;  Location: [facility]     ESOPHAGOSCOPY, GASTROSCOPY, DUODENOSCOPY (EGD), COMBINED N/A 08/06/2021    Procedure: ESOPHAGOGASTRODUODENOSCOPY (EGD) with necrosectomy, and stents exchanged;  Surgeon: Jose Quigley MD;  Location: [facility]     ESOPHAGOSCOPY, GASTROSCOPY, DUODENOSCOPY (EGD), COMBINED Left 09/13/2021    Procedure: ESOPHAGOGASTRODUODENOSCOPY (EGD), GASTROSTOMY TUBE REMOVAL, FISTULAR CLOSURE ENDOSCOPIC.;  Surgeon: Rayshawn Will MD;  Location: [facility]     IR ABSCESS TUBE CHANGE  07/14/2021     IR NG TUBE PLACEMENT REQ RAD & FLUORO  10/26/2021     IR PARACENTESIS  07/21/2021     IR SINOGRAM INJECTION DIAGNOSTIC  08/25/2021     IR THORACENTESIS  02/05/2022     ORTHOPEDIC SURGERY Right     fracture repair     REPLACE GASTROSTOMY TUBE, PERCUTANEOUS N/A 07/28/2021    Procedure: gastro-jejunostomy tube exchange;  Surgeon: Rayshawn Will MD;  Location: [facility] OR     Current Outpatient Medications   Medication Sig Dispense Refill     [START ON 3/28/2025]  amphetamine-dextroamphetamine (ADDERALL XR) 30 MG 24 hr capsule 2 tab in AM and one tab at 2 PM 90 capsule 0     amphetamine-dextroamphetamine (ADDERALL) 30 MG tablet 2 tab in Am and one tab at 2 PM 90 tablet 0     [START ON 2/28/2025] amphetamine-dextroamphetamine (ADDERALL) 30 MG tablet 2 tab in AM and one tab at 2 PM 90 tablet 0     amphetamine-dextroamphetamine (ADDERALL) 30 MG tablet 2 tab in AM and one tab at 2 PM 90 tablet 0     buprenorphine (BUTRANS) 5 MCG/HR WK patch Place 1 patch onto the skin every 7 days.       ciclopirox (LOPROX) 0.77 % cream Apply topically 2 times daily To feet and toenails. 90 g 5     oxyCODONE (ROXICODONE) 5 MG tablet Take 1 tablet (5 mg) by mouth every 6 hours as needed for pain 6 tablet 0     sulfamethoxazole-trimethoprim (BACTRIM DS) 800-160 MG tablet Take 1 tablet by mouth 2 times daily for 5 days. 10 tablet 0     baclofen (LIORESAL) 10 MG tablet Take 1 tablet (10 mg) by mouth 3 times daily (Patient not taking: Reported on 2/13/2025) 90 tablet 2     hydrOXYzine HCl (ATARAX) 25 MG tablet TAKE 1 TABLET(25 MG) BY MOUTH THREE TIMES DAILY AS NEEDED FOR ITCHING (Patient not taking: Reported on 2/13/2025) 90 tablet 0     lidocaine (XYLOCAINE) 5 % external ointment Apply topically as needed for moderate pain (qid as needed) Apply to feet and knees qid for 10 days, then as needed (Patient not taking: Reported on 2/13/2025) 240 g 0     midodrine (PROAMATINE) 10 MG tablet One tab before Dialysis (Patient not taking: Reported on 2/13/2025) 30 tablet 0     pantoprazole (PROTONIX) 40 MG EC tablet Take 1 tablet (40 mg) by mouth daily (Patient not taking: Reported on 2/13/2025) 90 tablet 2     prochlorperazine (COMPAZINE) 5 MG tablet Take 1 tablet (5 mg) by mouth every 8 hours as needed for nausea or vomiting (Patient not taking: Reported on 2/13/2025) 90 tablet 3     propranolol (INDERAL) 10 MG tablet Take 1 tablet (10 mg) by mouth 3 times daily as needed (as needed) (Patient not taking:  "Reported on 2/13/2025) 90 tablet 0     torsemide (DEMADEX) 20 MG tablet 2 tab daily (Patient not taking: Reported on 2/13/2025) 180 tablet 3     No current facility-administered medications for this visit.      No Known Allergies    Social History     Socioeconomic History     Marital status: Single     Spouse name: Not on file     Number of children: Not on file     Years of education: Not on file     Highest education level: Not on file   Occupational History     Not on file   Tobacco Use     Smoking status: Every Day     Current packs/day: 0.25     Types: Cigarettes     Passive exposure: Current     Smokeless tobacco: Never     Tobacco comments:     10/22/24 more or less around half a pack of cigs/day.          8/24/2021 Patient did not want to discuss quitting but would like 4 mg lozenge to use during admission. Patient also accepted \"Quitting for Good\" workbook   Vaping Use     Vaping status: Never Used     Passive vaping exposure: Yes   Substance and Sexual Activity     Alcohol use: Not Currently     Comment: \"no alcohol for over 1 year ago\" per pt on 5/12/22     Drug use: No     Sexual activity: Not Currently     Partners: Female   Other Topics Concern     Not on file   Social History Narrative     Not on file     Social Drivers of Health     Financial Resource Strain: Low Risk  (11/21/2023)    Financial Resource Strain      Within the past 12 months, have you or your family members you live with been unable to get utilities (heat, electricity) when it was really needed?: No   Recent Concern: Financial Resource Strain - Medium Risk (9/1/2023)    Overall Financial Resource Strain (CARDIA)      Difficulty of Paying Living Expenses: Somewhat hard   Food Insecurity: High Risk (11/21/2023)    Food Insecurity      Within the past 12 months, did you worry that your food would run out before you got money to buy more?: Yes      Within the past 12 months, did the food you bought just not last and you didn t have money " to get more?: No   Transportation Needs: High Risk (11/21/2023)    Transportation Needs      Within the past 12 months, has lack of transportation kept you from medical appointments, getting your medicines, non-medical meetings or appointments, work, or from getting things that you need?: Yes   Physical Activity: Patient Declined (9/1/2023)    Exercise Vital Sign      Days of Exercise per Week: Patient declined      Minutes of Exercise per Session: Patient declined   Recent Concern: Physical Activity - Inactive (6/5/2023)    Exercise Vital Sign      Days of Exercise per Week: 0 days      Minutes of Exercise per Session: 0 min   Stress: Stress Concern Present (9/1/2023)    [employer] [place] [employer] - Occupational Stress Questionnaire      Feeling of Stress : Rather much   Social Connections: Unknown (9/1/2023)    Social Connection and Isolation Panel [NHANES]      Frequency of Communication with Friends and Family: Twice a week      Frequency of Social Gatherings with Friends and Family: Patient declined      Attends Rastafari Services: Patient declined      Active Member of Clubs or Organizations: No      Attends Club or Organization Meetings: Never      Marital Status: Patient declined   Interpersonal Safety: Low Risk  (12/26/2024)    Interpersonal Safety      Do you feel physically and emotionally safe where you currently live?: Yes      Within the past 12 months, have you been hit, slapped, kicked or otherwise physically hurt by someone?: No      Within the past 12 months, have you been humiliated or emotionally abused in other ways by your partner or ex-partner?: No   Housing Stability: High Risk (11/21/2023)    Housing Stability      Do you have housing? : Yes      Are you worried about losing your housing?: Yes     Family History   Problem Relation Age of Onset     Attention Deficit Disorder Mother      Alcoholism Mother      Cancer Mother      Alcoholism Father      Alcoholism Sister       Alcoholism Brother       ROS: 10 point ROS neg other than the symptoms noted above in the HPI.    PE:  B/P: 118/82, T: 96.7, P: Data Unavailable, R: Data Unavailable  General: well developed, well nourished WM who appears their stated age  HEENT: NC/AT, EOMI, (-)icterus, (-)injection  Neck: Supple, No JVD  Chest: CTA, multiple scars from prior access.  Heart: S1, S2, (-)m/r/g  Abd: Soft, non tender, non distended, non tender, no masses  Back: Multiple crypts.  With significant hair burden.  Clean I&D site to left buttock cleft.  Packing removed.  Ext; Warm, no edema, left upper extremity AV fistula.  Psych: AAOx3  Neuro: No focal deficits    EXAM: MR SACRUM AND COCCYX W/O CONTRAST  LOCATION: [facility]  DATE: 2/11/2025     INDICATION: history of recent spinal injection, now with cyst over site in lumbosacral area  COMPARISON: None.   TECHNIQUE: Unenhanced.     FINDINGS:      SI JOINTS AND BONES:  -No fracture or bone marrow edema. Mild L4-L5 disc space narrowing and disc desiccation; there is a posterior disc bulge with an apparent annular fissure. No sacroiliac joint effusion, synovitis, or erosive change.     SOFT TISSUES:  -There is a complex fluid collection posterior to the sacrococcygeal junction which measures 1.2 x 1.6 x 2.6 cm. There is significant surrounding soft tissue edema.     MUSCLES:  -Muscle bulk is normal.                                                                      IMPRESSION:  1.  There is a 2.6 cm complex fluid collection posterior to the sacrococcygeal junction, with significant surrounding soft tissue edema. These findings are most concerning for an abscess or hematoma. This would be amenable to ultrasound-guided aspiration   if clinically indicated.  2.  No acute osseous abnormality. Specifically, there is normal marrow signal in the adjacent sacrum and coccyx.  3.  Mild L4-L5 degenerative disc disease.       Impression/plan:  This is a  34-year-old gent with multiple medical problems presenting with a pilonidal abscess that was drained in the ER 2 days ago.  He is improved with the drainage.  I briefly described the etiology of pilonidal cyst with the patient as well as the role of excision.  After discussion the patient the plan at this time is have him finish his course of antibiotics, have him wash the wound out daily with soap and water and follow-up with me in 2 weeks.  Once it is healed we will plan for an elective excision in the future.    Manoj Montes MD, FACS      Again, thank you for allowing me to participate in the care of your patient.        Sincerely,        Manoj Montes MD    Electronically signed

## 2025-02-13 NOTE — TELEPHONE ENCOUNTER
Routing to     Patient calling    He is supposed to go to dialysis today but his transportation ([employer]) came and he did not go down in time, so they left.     He is wondering how to get to dialysis. RN recommended asking a friend/neighbor/family member, or uber and patient said he could do an uber possibly.     He would really like to discuss other transportation options.    RN asked if he has met with care coordination as it looks like it was recently referred by PCP and he was agreeable    DAMASO Arenas  [place] Triage RN  [facility]

## 2025-02-13 NOTE — PROGRESS NOTES
Consult requested by Dr. Gomez      Reason for consultation pilonidal cyst and abscess    Patient is a 34-year-old white male with ESRD on HD who developed a painful sacrum earlier this week.  He had an incision and drainage done in the ER Tuesday and was found to have a pilonidal abscess.  He also did have a recent spine injection for chronic back pain but the injection site was not near this abscess.  The abscess was drained in the ER.  He does have a history of prior abdominal abscess that was drained about 15 years ago.  He now presents to me for evaluation of his pilonidal cyst and abscess.  He is also making arrangements for possible kidney transplant but would like this treated prior to any transplant.  He was started on antibiotics by the ER.  He now presents to me for evaluation of his pilonidal abscess.    Past Medical History:   Diagnosis Date    ADHD 2008    Alcohol abuse, in remission     Alcoholic cirrhosis of liver with ascites (H)     Anemia in chronic kidney disease     Chronic pain     Current smoker     End stage renal disease (H)     Hypotension, unspecified hypotension type     Malnutrition     Metabolic acidosis     Necrotizing pancreatitis     Pancreatic insufficiency     Pericarditis     Recurrent pleural effusion        Past Surgical History:   Procedure Laterality Date    AV FISTULA PLACEMENT, BRACHIOCEPHALIC Left 05/11/2022    [facility]    COMBINED ESOPHAGOSCOPY, GASTROSCOPY, DUODENOSCOPY (EGD), TISSUE APPOSI N/A 09/20/2021    Procedure: ESOPHAGOGASTRODUODENOSCOPY WITH SUTURE FISTULA CLOSURE, argon plasma coagulation;  Surgeon: Jose Quigley MD;  Location: [facility]    ENDOSCOPIC INSERTION TUBE GASTROSTOMY N/A 07/13/2021    Procedure: Upper endoscopy, INSERTION, PEG-J TUBE and Gastropexy;  Surgeon: Rayshawn Will MD;  Location: [facility]    ENDOSCOPIC INSERTION TUBE GASTROSTOMY  09/13/2021    Procedure: PEG/J PLACEMENT.;  Surgeon: Rayshawn Will MD;  Location: [facility]     ENDOSCOPIC ULTRASOUND UPPER GASTROINTESTINAL TRACT (GI) N/A 07/21/2021    Procedure: ENDOSCOPIC ULTRASOUND, ESOPHAGOSCOPY / UPPER GASTROINTESTINAL TRACT (GI) with cyst gastrostomy, dilation;  Surgeon: Guru Yecenia Chacko MD;  Location: [facility]    ENDOSCOPIC ULTRASOUND, ESOPHAGOSCOPY, GASTROSCOPY, DUODENOSCOPY (EGD), NECROSECTOMY N/A 08/11/2021    Procedure: ESOPHAGOGASTRODUODENOSCOPY (EGD) with necrosectomy, stent exchange.;  Surgeon: Amadou Beasley MD;  Location: [facility]    ESOPHAGOSCOPY, GASTROSCOPY, DUODENOSCOPY (EGD), COMBINED N/A 07/28/2021    Procedure: ESOPHAGOGASTRODUODENOSCOPY (EGD), gastrostomy apposition, Necrosectomy, stent exchange.;  Surgeon: Rayshawn Will MD;  [facility]: [facility]    ESOPHAGOSCOPY, GASTROSCOPY, DUODENOSCOPY (EGD), COMBINED N/A 08/06/2021    Procedure: ESOPHAGOGASTRODUODENOSCOPY (EGD) with necrosectomy, and stents exchanged;  Surgeon: Jose Quigley MD;  Location: [facility]    ESOPHAGOSCOPY, GASTROSCOPY, DUODENOSCOPY (EGD), COMBINED Left 09/13/2021    Procedure: ESOPHAGOGASTRODUODENOSCOPY (EGD), GASTROSTOMY TUBE REMOVAL, FISTULAR CLOSURE ENDOSCOPIC.;  Surgeon: Rayshawn Will MD;  Location: [facility]    IR ABSCESS TUBE CHANGE  07/14/2021    IR NG TUBE PLACEMENT REQ RAD & FLUORO  10/26/2021    IR PARACENTESIS  07/21/2021    IR SINOGRAM INJECTION DIAGNOSTIC  08/25/2021    IR THORACENTESIS  02/05/2022    ORTHOPEDIC SURGERY Right     fracture repair    REPLACE GASTROSTOMY TUBE, PERCUTANEOUS N/A 07/28/2021    Procedure: gastro-jejunostomy tube exchange;  Surgeon: Rayshawn Will MD;  Location: [facility]     Current Outpatient Medications   Medication Sig Dispense Refill    [START ON 3/28/2025] amphetamine-dextroamphetamine (ADDERALL XR) 30 MG 24 hr capsule 2 tab in AM and one tab at 2 PM 90 capsule 0    amphetamine-dextroamphetamine (ADDERALL) 30 MG tablet 2 tab in Am and one tab at 2 PM 90 tablet 0    [START ON 2/28/2025] amphetamine-dextroamphetamine (ADDERALL)  30 MG tablet 2 tab in AM and one tab at 2 PM 90 tablet 0    amphetamine-dextroamphetamine (ADDERALL) 30 MG tablet 2 tab in AM and one tab at 2 PM 90 tablet 0    buprenorphine (BUTRANS) 5 MCG/HR WK patch Place 1 patch onto the skin every 7 days.      ciclopirox (LOPROX) 0.77 % cream Apply topically 2 times daily To feet and toenails. 90 g 5    oxyCODONE (ROXICODONE) 5 MG tablet Take 1 tablet (5 mg) by mouth every 6 hours as needed for pain 6 tablet 0    sulfamethoxazole-trimethoprim (BACTRIM DS) 800-160 MG tablet Take 1 tablet by mouth 2 times daily for 5 days. 10 tablet 0    baclofen (LIORESAL) 10 MG tablet Take 1 tablet (10 mg) by mouth 3 times daily (Patient not taking: Reported on 2/13/2025) 90 tablet 2    hydrOXYzine HCl (ATARAX) 25 MG tablet TAKE 1 TABLET(25 MG) BY MOUTH THREE TIMES DAILY AS NEEDED FOR ITCHING (Patient not taking: Reported on 2/13/2025) 90 tablet 0    lidocaine (XYLOCAINE) 5 % external ointment Apply topically as needed for moderate pain (qid as needed) Apply to feet and knees qid for 10 days, then as needed (Patient not taking: Reported on 2/13/2025) 240 g 0    midodrine (PROAMATINE) 10 MG tablet One tab before Dialysis (Patient not taking: Reported on 2/13/2025) 30 tablet 0    pantoprazole (PROTONIX) 40 MG EC tablet Take 1 tablet (40 mg) by mouth daily (Patient not taking: Reported on 2/13/2025) 90 tablet 2    prochlorperazine (COMPAZINE) 5 MG tablet Take 1 tablet (5 mg) by mouth every 8 hours as needed for nausea or vomiting (Patient not taking: Reported on 2/13/2025) 90 tablet 3    propranolol (INDERAL) 10 MG tablet Take 1 tablet (10 mg) by mouth 3 times daily as needed (as needed) (Patient not taking: Reported on 2/13/2025) 90 tablet 0    torsemide (DEMADEX) 20 MG tablet 2 tab daily (Patient not taking: Reported on 2/13/2025) 180 tablet 3     No current facility-administered medications for this visit.      No Known Allergies    Social History     Socioeconomic History    Marital status:  "Single     Spouse name: Not on file    Number of children: Not on file    Years of education: Not on file    Highest education level: Not on file   Occupational History    Not on file   Tobacco Use    Smoking status: Every Day     Current packs/day: 0.25     Types: Cigarettes     Passive exposure: Current    Smokeless tobacco: Never    Tobacco comments:     10/22/24 more or less around half a pack of cigs/day.          8/24/2021 Patient did not want to discuss quitting but would like 4 mg lozenge to use during admission. Patient also accepted \"Quitting for Good\" workbook   Vaping Use    Vaping status: Never Used    Passive vaping exposure: Yes   Substance and Sexual Activity    Alcohol use: Not Currently     Comment: \"no alcohol for over 1 year ago\" per pt on 5/12/22    Drug use: No    Sexual activity: Not Currently     Partners: Female   Other Topics Concern    Not on file   Social History Narrative    Not on file     Social Drivers of Health     Financial Resource Strain: Low Risk  (11/21/2023)    Financial Resource Strain     Within the past 12 months, have you or your family members you live with been unable to get utilities (heat, electricity) when it was really needed?: No   Recent Concern: Financial Resource Strain - Medium Risk (9/1/2023)    Overall Financial Resource Strain (CARDIA)     Difficulty of Paying Living Expenses: Somewhat hard   Food Insecurity: High Risk (11/21/2023)    Food Insecurity     Within the past 12 months, did you worry that your food would run out before you got money to buy more?: Yes     Within the past 12 months, did the food you bought just not last and you didn t have money to get more?: No   Transportation Needs: High Risk (11/21/2023)    Transportation Needs     Within the past 12 months, has lack of transportation kept you from medical appointments, getting your medicines, non-medical meetings or appointments, work, or from getting things that you need?: Yes   Physical " Activity: Patient Declined (9/1/2023)    Exercise Vital Sign     Days of Exercise per Week: Patient declined     Minutes of Exercise per Session: Patient declined   Recent Concern: Physical Activity - Inactive (6/5/2023)    Exercise Vital Sign     Days of Exercise per Week: 0 days     Minutes of Exercise per Session: 0 min   Stress: Stress Concern Present (9/1/2023)    [employer] [place] [employer] - Occupational Stress Questionnaire     Feeling of Stress : Rather much   Social Connections: Unknown (9/1/2023)    Social Connection and Isolation Panel [NHANES]     Frequency of Communication with Friends and Family: Twice a week     Frequency of Social Gatherings with Friends and Family: Patient declined     Attends [employer]ism Services: Patient declined     Active Member of Clubs or Organizations: No     Attends Club or Organization Meetings: Never     Marital Status: Patient declined   Interpersonal Safety: Low Risk  (12/26/2024)    Interpersonal Safety     Do you feel physically and emotionally safe where you currently live?: Yes     Within the past 12 months, have you been hit, slapped, kicked or otherwise physically hurt by someone?: No     Within the past 12 months, have you been humiliated or emotionally abused in other ways by your partner or ex-partner?: No   Housing Stability: High Risk (11/21/2023)    Housing Stability     Do you have housing? : Yes     Are you worried about losing your housing?: Yes     Family History   Problem Relation Age of Onset    Attention Deficit Disorder Mother     Alcoholism Mother     Cancer Mother     Alcoholism Father     Alcoholism Sister     Alcoholism Brother       ROS: 10 point ROS neg other than the symptoms noted above in the HPI.    PE:  B/P: 118/82, T: 96.7, P: Data Unavailable, R: Data Unavailable  General: well developed, well nourished WM who appears their stated age  HEENT: NC/AT, EOMI, (-)icterus, (-)injection  Neck: Supple, No JVD  Chest: CTA, multiple  scars from prior access.  Heart: S1, S2, (-)m/r/g  Abd: Soft, non tender, non distended, non tender, no masses  Back: Multiple crypts.  With significant hair burden.  Clean I&D site to left buttock cleft.  Packing removed.  Ext; Warm, no edema, left upper extremity AV fistula.  Psych: AAOx3  Neuro: No focal deficits    EXAM: MR SACRUM AND COCCYX W/O CONTRAST  LOCATION: [facility]  DATE: 2/11/2025     INDICATION: history of recent spinal injection, now with cyst over site in lumbosacral area  COMPARISON: None.   TECHNIQUE: Unenhanced.     FINDINGS:      SI JOINTS AND BONES:  -No fracture or bone marrow edema. Mild L4-L5 disc space narrowing and disc desiccation; there is a posterior disc bulge with an apparent annular fissure. No sacroiliac joint effusion, synovitis, or erosive change.     SOFT TISSUES:  -There is a complex fluid collection posterior to the sacrococcygeal junction which measures 1.2 x 1.6 x 2.6 cm. There is significant surrounding soft tissue edema.     MUSCLES:  -Muscle bulk is normal.                                                                      IMPRESSION:  1.  There is a 2.6 cm complex fluid collection posterior to the sacrococcygeal junction, with significant surrounding soft tissue edema. These findings are most concerning for an abscess or hematoma. This would be amenable to ultrasound-guided aspiration   if clinically indicated.  2.  No acute osseous abnormality. Specifically, there is normal marrow signal in the adjacent sacrum and coccyx.  3.  Mild L4-L5 degenerative disc disease.       Impression/plan:  This is a 34-year-old gent with multiple medical problems presenting with a pilonidal abscess that was drained in the ER 2 days ago.  He is improved with the drainage.  I briefly described the etiology of pilonidal cyst with the patient as well as the role of excision.  After discussion the patient the plan at this time is have him finish  his course of antibiotics, have him wash the wound out daily with soap and water and follow-up with me in 2 weeks.  Once it is healed we will plan for an elective excision in the future.    Manoj Montes MD, FACS

## 2025-02-18 ENCOUNTER — PATIENT OUTREACH (OUTPATIENT)
Dept: NURSING | Facility: CLINIC | Age: 35
End: 2025-02-18
Payer: MEDICARE

## 2025-02-18 ENCOUNTER — OFFICE VISIT (OUTPATIENT)
Dept: FAMILY MEDICINE | Facility: CLINIC | Age: 35
End: 2025-02-18
Payer: MEDICARE

## 2025-02-18 VITALS
RESPIRATION RATE: 23 BRPM | OXYGEN SATURATION: 96 % | DIASTOLIC BLOOD PRESSURE: 78 MMHG | HEART RATE: 90 BPM | TEMPERATURE: 98.1 F | SYSTOLIC BLOOD PRESSURE: 136 MMHG

## 2025-02-18 DIAGNOSIS — Z99.2 ESRD (END STAGE RENAL DISEASE) ON DIALYSIS (H): ICD-10-CM

## 2025-02-18 DIAGNOSIS — K21.9 GASTROESOPHAGEAL REFLUX DISEASE WITHOUT ESOPHAGITIS: ICD-10-CM

## 2025-02-18 DIAGNOSIS — F41.1 GAD (GENERALIZED ANXIETY DISORDER): ICD-10-CM

## 2025-02-18 DIAGNOSIS — N18.6 ESRD (END STAGE RENAL DISEASE) ON DIALYSIS (H): ICD-10-CM

## 2025-02-18 DIAGNOSIS — F90.2 ATTENTION DEFICIT HYPERACTIVITY DISORDER (ADHD), COMBINED TYPE: ICD-10-CM

## 2025-02-18 DIAGNOSIS — L05.01 PILONIDAL ABSCESS: Primary | ICD-10-CM

## 2025-02-18 PROCEDURE — 99214 OFFICE O/P EST MOD 30 MIN: CPT | Mod: 24 | Performed by: FAMILY MEDICINE

## 2025-02-18 RX ORDER — DEXTROAMPHETAMINE SACCHARATE, AMPHETAMINE ASPARTATE, DEXTROAMPHETAMINE SULFATE AND AMPHETAMINE SULFATE 7.5; 7.5; 7.5; 7.5 MG/1; MG/1; MG/1; MG/1
TABLET ORAL
Qty: 90 TABLET | Refills: 0 | Status: SHIPPED | OUTPATIENT
Start: 2025-04-28

## 2025-02-18 RX ORDER — DEXTROAMPHETAMINE SACCHARATE, AMPHETAMINE ASPARTATE, DEXTROAMPHETAMINE SULFATE AND AMPHETAMINE SULFATE 7.5; 7.5; 7.5; 7.5 MG/1; MG/1; MG/1; MG/1
TABLET ORAL
Qty: 90 TABLET | Refills: 0 | Status: SHIPPED | OUTPATIENT
Start: 2025-03-28

## 2025-02-18 ASSESSMENT — PATIENT HEALTH QUESTIONNAIRE - PHQ9
SUM OF ALL RESPONSES TO PHQ QUESTIONS 1-9: 0
SUM OF ALL RESPONSES TO PHQ QUESTIONS 1-9: 0

## 2025-02-18 ASSESSMENT — ACTIVITIES OF DAILY LIVING (ADL): DEPENDENT_IADLS:: CLEANING;COOKING;LAUNDRY;SHOPPING;MEAL PREPARATION;TRANSPORTATION

## 2025-02-18 NOTE — PROGRESS NOTES
Assessment & Plan     Pilonidal abscess, S/P I&D  Healing well, no discharges or pain.    Follow up with general surgery on 02/27/2025, for consideration of elective excision.    Attention deficit hyperactivity disorder (ADHD), combined type    - amphetamine-dextroamphetamine (ADDERALL) 30 MG tablet; 2 tab in AM and one tab at 2 PM  - amphetamine-dextroamphetamine (ADDERALL) 30 MG tablet; 2 tab in Am and one tab at 2 PM    ESRD (end stage renal disease) on dialysis (H)  On Dialysis.    Gastroesophageal reflux disease without esophagitis  Continue with current medicines    DIMITRI (generalized anxiety disorder)  Need a new referral.  - Adult Mental Health  Referral; Future      MED REC REQUIRED  Post Medication Reconciliation Status:      David Verduzco is a 34 year old, presenting for the following health issues:  ER F/U    Was initially seen on February 11, 2025 in the ER for pilonidal cyst abscess.  It was  draining.  Subsequently, he was seen by the surgeon on February 13, 2025.  Today, he states cyst is healing well, no more discharges, no more bleeding.          2/18/2025    12:28 PM   Additional Questions   Roomed by thais zuleta ma     hospitals       ED/UC Followup:    Facility:  [facility] ED  Date of visit: 2/11/25  Reason for visit: back pain  Current Status: back is doing so so and feeling normal, immediate pressure was relieved    Patient wrote a list of a few other concerns he would like to discuss also.        Review of Systems  Constitutional, HEENT, cardiovascular, pulmonary, GI, , musculoskeletal, neuro, skin, endocrine and psych systems are negative, except as otherwise noted.      Objective    /78 (BP Location: Right arm, Patient Position: Sitting, Cuff Size: Adult Large)   Pulse 90   Temp 98.1  F (36.7  C) (Oral)   Resp 23   SpO2 96%   There is no height or weight on file to calculate BMI.  Physical Exam   GENERAL: alert and no distress  ABDOMEN: soft, nontender, no  hepatosplenomegaly, no masses and bowel sounds normal  SKIN: Pilonidal cyst is healing well. Minor indurations, no abscess  No discharges and no bleeding.            Signed Electronically by: Kole Gomez MD    Answers submitted by the patient for this visit:  Patient Health Questionnaire (Submitted on 2/18/2025)  PHQ9 TOTAL SCORE: 0

## 2025-02-18 NOTE — LETTER
[facility] CARE COORDINATION  [address] [address]     February 18, 2025    Dax Villareal  [address]   [place] [address]      Dear Dax,    I am a clinic care coordinator who works with Kole Gomez MD with the [facility]. I wanted to introduce myself and provide you with my contact information for you to be able to call me with any questions or concerns. Below is a description of clinic care coordination and how I can further assist you.       The clinic care coordination team is made up of a registered nurse, , financial resource worker and community health worker who understand the health care system. The goal of clinic care coordination is to help you manage your health and improve access to the health care system. Our team works alongside your provider to assist you in determining your health and social needs. We can help you obtain health care and community resources, providing you with necessary information and education. We can work with you through any barriers and develop a care plan that helps coordinate and strengthen the communication between you and your care team.  Our services are voluntary and are offered without charge to you personally.    Please feel free to contact me with any questions or concerns regarding care coordination and what we can offer.      We are focused on providing you with the highest-quality healthcare experience possible.    I have enclosed alternate transportation resources for you:    [employer]   [phone]    $12 one way up to 15 minutes or sliding scale   Will make multiple stops     [employer]   [phone]  Will also make multiple stops     I will attempt to call you again this week to further discuss your needs.     Sincerely,     Paz Shirley, DARNELL, MSW Clinic   [facility]  Care Coordination  [facility]    [email or web address]  Office: [phone]  Employed by [facility]

## 2025-02-18 NOTE — PROGRESS NOTES
Clinic Care Coordination Contact  [facility]/Voicemail     initial phone assessment     Clinical Data: Care Coordinator Outreach  {Link to outreach attempt documentation:[id number]}  Outreach Documentation Number of Outreach Attempt   2/18/2025  10:03 AM 1       Left message on patient's voicemail with call back information and requested return call.      Plan: Care Coordinator sent care coordination introduction letter via [employer]. Care Coordinator will try to reach patient again in 1-2 business days.    Paz Shirley, TISHAW, MSW   [facility]  Care Coordination  [facility]  [phone]  2/18/2025 2:04 PM

## 2025-02-19 NOTE — PROGRESS NOTES
"SW informed she has found some additional transportation needs   SW dialysis assisted with transportation needs.    [facility] is what he will use for dialysis transport, as very close to [facility].    [facility] card-receives $400 per month   [employer] not using anymore.  Inconsistencies with three agencies (most recently [facility])  [employer] [phone]    Being considered for a kidney transplant, deemed not a candidate for a liver transplant  May 2021--on dialysis for kidney    Concerns about poor motivation, poor insight, lack of understanding of psychological matters affecting his functioning     Per 2025--  Did not not agree, transplant referral closed and he must be re referred  Wishes for neuro assessment repeated in 1 year   Housing  is needed for a year after the possible transplant as required by the transplant care team   Loi, , is out of the office until 2025  He does not have a CADI worker?  Informal family support group     Psychiatry, psychology, podiatry, neurology, nephrology  Depression, he thinks this situational depression   Anxiety, ADHD  Chronic pain  Severe neuropathy bilateral feet   H/A--pain  During times of stress    Neuropsychology evaluation 2024  Recommendations:  PCA, ILS, ARMHS  Deficits in attention, executive functioning, practical reasoning about health and safety   Concern with reduced risk, reduced insight, non adherence for medical decision making and medical matters    Able to walk some, uses motorized w/c, scooter  Needs assistance with transport, shopping, meal preparation, housework, laundry  [employer] membership (recently )  Manages meds per self (may forget to take AM meds)  Manages all finances and paperwork--concerns with spending lately   Despite slight tremor in hands  \"Gets behind for laundry, housekeeping\"    Psychiatry last appt 2024, return in 6 months    Loi, they did coordinate a CADI worker, they did a 6 month review, " "nothing done for several months.  CADI worker \"backed out of it\" and Loi backed out a well as he was to sign on [place]--he does not feel this is a plan that is close to his needs.     Uncertain where the CADI worker process is currently.  He would be interested in having a new assessment with the county for a CADI worker.  Thinking of moving to a new area as does not see his care needs being met in [facility] or [facility] services. He is considering obtaining a  as does not to get \"nickeled and dimed\" in these counties.  He does not feel his time has been honored for all of his efforts to obtain his needed care and was followed through for his care needs\".      Possible 2 BR in a step mother apt with others he knows near his support group     Upset as they are \"only giving only 10%\"    He does not feel any of the presented agencies are qualified to provide the services he is \"entitled to\" after all he has gone through in his past with \"poor medical care\" and \"follow through\"    Dental procedures for possible kidney transplant?  Was being seen at the [facility] dental clinic   Still working with on obtaining dentures   Follow up appt scheduled for tomorrow and 2 future appts  Teeth shattered, chipped while intubated in the past     Lives alone, good support system with sister, brothers, aunts and cousins    He is writing all legislative representatives to ensure they are receiving \"good care\". He has filed a grievance with the [facility] regarding his past multiple concerns with nursing facilities and lack of follow through on multiple attempts with obtaining a PCA.  Perseverative grievances about past facility treatment, problems with insurance, diffuclty obtaining services in [facility], and the state level, having had multiple extensive discussions with local, state and federal agencies--he states this is part of his PTSD    Many concerns about disability status, CADI and PCA process and approvals, he stated " "\"no one can help him\"     Not drinking again       Perseverative, tangential     Dariana Carlson 2/6/2024--    Wanting someone to schedule and coordinate care.  SW stated she wouldn't be able to coordinate, schedule and attend appts.  He could pay for this service and or family could provide.      He has an  and a  working on his needs.      This SW discussed Critical access hospital worker on 8/1/2024 and he declined.    ARM could assist with coordination of appts?  Declined again this referral.  He does not know how helpful this would be.       " with coordination of appts and assist him with creating his own schedule to do so.  ARMHS worker again discussed and again declined. Patient stated he does not see how this would be helpful.      Patient reported he lives alone but has a very good support system with his sister, brothers, aunts and cousins.  Patient reported he is considering obtaining a 2 BR in a step mother apt with others he knows near his family, which live outside of the [facility] area.  Patient stated he will consider options for CADI worker as uncertain if he is moving, he wishes to complete dental work initially and then consider his options.      Much active listening done in this call and allowing opportunity to vent and validate his feelings.      Current Medical Concerns:   Patient Active Problem List   Diagnosis    Necrotizing pancreatitis    ESRD (end stage renal disease) on dialysis (H)    Cirrhosis of liver (H)    Muscle weakness    Contracture of joint of foot, unspecified laterality    DIMITRI (generalized anxiety disorder)    Alcohol abuse, in remission    Chronic pain    Pancreatic insufficiency    Anemia in chronic kidney disease    Acquired bilateral pes cavus    Chronic pain of both feet    ADHD    Lumbar radiculopathy    Current moderate episode of major depressive disorder (H)      Current Behavioral Concerns: patient sees Psychiatry, psychology, podiatry, neurology, nephrology; has dx of depression, patient believes this is situational depression due to ongoing multiple life stressors.  Anxiety, ADHD and chronic pain noted in the chart.  Patient tends to perseverate and it is often very difficult to redirect.  Psychiatry last appt 1/19/2024, recommendation to return in 6 months    Education Provided to patient:     Alternate transportation:     [employer] [phone]  [employer]     Anywhere in seven counties they serve, will make multiple stops.    Transportation: $12   One-way trip up to 15 mins or sliding scale based  "on age and income    Pain  Pain (GOAL):: Yes  Health Maintenance Reviewed:    Health Maintenance Due   Topic Date Due    MICROALBUMIN  Never done    PARATHYROID  Never done    HEPATITIS A IMMUNIZATION (1 of 2 - Risk 2-dose series) Never done    HEPATITIS B IMMUNIZATION (2 of 5 - Risk Dialysis 4-dose series) 01/26/2024    ALT  10/19/2024    LIPID  01/30/2025    URINE DRUG SCREEN  01/30/2025    MEDICARE ANNUAL WELLNESS VISIT  01/30/2025      Very difficult to review/discuss Health Maintenance as patient tended to focus on his past concerns with multiple resources and the ways he feels he has been \"wronged\" by various health systems    Clinical Pathway: None    Medication Management:  Medication review status: Medications reviewed and no changes reported per patient.           Functional Status:    Patient is able to walk short distances, uses motorized w/c, scooter. Patient needs assistance with transport, shopping, meal preparation, housework, laundry.  Patient manages meds per self (may forget to take AM meds), manages all finances and paperwork but stated concerns with spending lately, Patient stated he \"gets behind for laundry, housekeeping\"    Dependent ADLs:: Independent, Wheelchair-independent (Can ambulate short distances without assistance)  Dependent IADLs:: Cleaning, Cooking, Laundry, Shopping, Meal Preparation, Transportation  Bed or wheelchair confined:: No  Mobility Status: Independent w/Device    Living Situation:  Current living arrangement:: I live alone  Type of residence:: Apartment - handicap accessible    Lifestyle & Psychosocial Needs:    Social Drivers of Health     Food Insecurity: High Risk (11/21/2023)    Food Insecurity     Within the past 12 months, did you worry that your food would run out before you got money to buy more?: Yes     Within the past 12 months, did the food you bought just not last and you didn t have money to get more?: No   Depression: Not at risk (2/18/2025)    PHQ-2     " PHQ-2 Score: 0   Recent Concern: Depression - At risk (12/26/2024)    PHQ-2     PHQ-2 Score: 3   Housing Stability: High Risk (11/21/2023)    Housing Stability     Do you have housing? : Yes     Are you worried about losing your housing?: Yes   Tobacco Use: High Risk (2/18/2025)    Patient History     Smoking Tobacco Use: Every Day     Smokeless Tobacco Use: Never     Passive Exposure: Current   Financial Resource Strain: Low Risk  (11/21/2023)    Financial Resource Strain     Within the past 12 months, have you or your family members you live with been unable to get utilities (heat, electricity) when it was really needed?: No   Recent Concern: Financial Resource Strain - Medium Risk (9/1/2023)    Overall Financial Resource Strain (CARDIA)     Difficulty of Paying Living Expenses: Somewhat hard   Alcohol Use: Not At Risk (6/5/2023)    AUDIT-C     Frequency of Alcohol Consumption: Never     Average Number of Drinks: Patient does not drink     Frequency of Binge Drinking: Never   Transportation Needs: High Risk (11/21/2023)    Transportation Needs     Within the past 12 months, has lack of transportation kept you from medical appointments, getting your medicines, non-medical meetings or appointments, work, or from getting things that you need?: Yes   Physical Activity: Patient Declined (9/1/2023)    Exercise Vital Sign     Days of Exercise per Week: Patient declined     Minutes of Exercise per Session: Patient declined   Recent Concern: Physical Activity - Inactive (6/5/2023)    Exercise Vital Sign     Days of Exercise per Week: 0 days     Minutes of Exercise per Session: 0 min   Interpersonal Safety: Low Risk  (12/26/2024)    Interpersonal Safety     Do you feel physically and emotionally safe where you currently live?: Yes     Within the past 12 months, have you been hit, slapped, kicked or otherwise physically hurt by someone?: No     Within the past 12 months, have you been humiliated or emotionally abused in  other ways by your partner or ex-partner?: No   Stress: Stress Concern Present (9/1/2023)    [employer] - Occupational Stress Questionnaire     Feeling of Stress : Rather much   Social Connections: Unknown (9/1/2023)    Social Connection and Isolation Panel [NHANES]     Frequency of Communication with Friends and Family: Twice a week     Frequency of Social Gatherings with Friends and Family: Patient declined     Attends Yazidism Services: Patient declined     Active Member of Clubs or Organizations: No     Attends Club or Organization Meetings: Never     Marital Status: Patient declined   Health Literacy: Not on file     Diet:: Regular  Inadequate nutrition (GOAL):: Yes  Tube Feeding: No  Inadequate activity/exercise (GOAL):: Yes  Significant changes in sleep pattern (GOAL): Yes  Transportation means:: Medical transport     Yazidism or spiritual beliefs that impact treatment:: No  Mental health DX:: Yes  Mental health management concern (GOAL):: Yes  Informal Support system:: Family, Friends, Neighbors      Resources and Interventions:  Current Resources: Dialysis Services, County Programs, OP Mental Health     Community Resources: Dialysis Services, County Programs, OP Mental Health  Supplies Currently Used at Home: None  Equipment Currently Used at Home: grab bar, tub/shower, wheelchair, power, shower chair  Employment Status: disabled     Advance Care Plan/Directive  Advanced Care Plans/Directives on file:: No    Referrals Placed: None     Patient/Caregiver understanding: Patient will consider options for CADI worker as uncertain if he is moving, he wishes to complete dental work initially       Future Appointments                In 2 days Manoj Montes MD [facility]    In 1 month Thierno Ho MD [facility]            DARNELL Heart, MSW   [facility]  [phone]  2/25/2025 9:10 AM

## 2025-02-25 ENCOUNTER — TRANSFERRED RECORDS (OUTPATIENT)
Dept: HEALTH INFORMATION MANAGEMENT | Facility: CLINIC | Age: 35
End: 2025-02-25
Payer: MEDICARE

## 2025-03-01 ENCOUNTER — HEALTH MAINTENANCE LETTER (OUTPATIENT)
Age: 35
End: 2025-03-01

## 2025-03-03 ENCOUNTER — TELEPHONE (OUTPATIENT)
Dept: FAMILY MEDICINE | Facility: CLINIC | Age: 35
End: 2025-03-03
Payer: MEDICARE

## 2025-03-03 ENCOUNTER — DOCUMENTATION ONLY (OUTPATIENT)
Dept: FAMILY MEDICINE | Facility: CLINIC | Age: 35
End: 2025-03-03
Payer: MEDICARE

## 2025-03-03 DIAGNOSIS — F90.2 ATTENTION DEFICIT HYPERACTIVITY DISORDER (ADHD), COMBINED TYPE: ICD-10-CM

## 2025-03-03 NOTE — TELEPHONE ENCOUNTER
Prior Authorization Retail Medication Request    Medication/Dose: amphetamine-dextroamphetamine (ADDERALL) 30 MG tablet  Diagnosis and ICD code (if different than what is on RX):  na  New/renewal/insurance change PA/secondary ins. PA:  Previously Tried and Failed:  na  Rationale:  na    Insurance   Primary: na  Insurance ID:  Pt. ID # is [id number]    Secondary (if applicable):na  Insurance ID:  na    Pharmacy Information (if different than what is on RX)  Name:  same  Phone:  same  Fax:same    Clinic Information  Preferred routing pool for dept communication: [facility]    The patients plan does not cover the prescribed drug without a prior authorization. Please contact the plan at [phone] to initiate a PA or call/fax the pharmacy to change medication.  Patient ID # is [id number]

## 2025-03-03 NOTE — PROGRESS NOTES
Patient had EMG study on 02/25/2025:    Severe sensorimotor polyneuropathy  No right lumbar radiculopathy.

## 2025-03-06 NOTE — TELEPHONE ENCOUNTER
PRIOR AUTHORIZATION DENIED    Medication: AMPHETAMINE-DEXTROAMPHET ER 30 MG PO CP24  Insurance Company: [employer] - Phone [phone] Fax [phone]  Denial Date: 3/6/2025  Denial Reason(s):           Appeal Information:         Patient Notified: No

## 2025-03-10 NOTE — TELEPHONE ENCOUNTER
General Call    Contacts       Contact Date/Time Type Contact Phone/Fax    03/03/2025 12:48 PM CST Fax (Incoming) [id number] - [place], [place] - [address] AT [facility] [address] (Pharmacy) [phone]    03/10/2025 12:34 PM CDT Phone (Incoming) Dax Villareal (Self) [phone] ()          Reason for Call:  Prior auth     What are your questions or concerns:  like to discuss it     Date of last appointment with provider: n/a    Could we send this information to you [facility] or would you prefer to receive a phone call?:   Patient would prefer a phone call   Okay to leave a detailed message?: Yes at Cell number on file:    Telephone Information:   Mobile [phone]

## 2025-03-11 NOTE — TELEPHONE ENCOUNTER
Pt calling for status of PA and pcp recommendations as he was told the PA was denied. Pt requested pcp to review today, writer informed pt pcp is not in clinic the rest of the day. Pt requesting pcp review. See note from PA team:    Patty Soto to [facility]         3/6/25  9:13 AM   Prior authorization has been denied. If the provider would like to appeal, please provide a letter of medical necessity and route back to the team. Otherwise, please discuss next steps with the patient if needed and close the encounter when finished.    Thank you!    Linda Caba, RN on 3/11/2025 at 4:09 PM

## 2025-03-12 ENCOUNTER — TELEPHONE (OUTPATIENT)
Dept: FAMILY MEDICINE | Facility: CLINIC | Age: 35
End: 2025-03-12
Payer: MEDICARE

## 2025-03-12 DIAGNOSIS — F90.2 ATTENTION DEFICIT HYPERACTIVITY DISORDER (ADHD), COMBINED TYPE: ICD-10-CM

## 2025-03-12 RX ORDER — DEXTROAMPHETAMINE SACCHARATE, AMPHETAMINE ASPARTATE, DEXTROAMPHETAMINE SULFATE AND AMPHETAMINE SULFATE 7.5; 7.5; 7.5; 7.5 MG/1; MG/1; MG/1; MG/1
TABLET ORAL
Qty: 90 TABLET | Refills: 0 | Status: SHIPPED | OUTPATIENT
Start: 2025-03-12

## 2025-03-12 RX ORDER — DEXTROAMPHETAMINE SACCHARATE, AMPHETAMINE ASPARTATE, DEXTROAMPHETAMINE SULFATE AND AMPHETAMINE SULFATE 7.5; 7.5; 7.5; 7.5 MG/1; MG/1; MG/1; MG/1
TABLET ORAL
Qty: 90 TABLET | Refills: 0 | OUTPATIENT
Start: 2025-03-12

## 2025-03-12 NOTE — TELEPHONE ENCOUNTER
Routing to Dr Gomez    Pharmacy deleted February prescription for Adderall and needs new prescription sent.    Pended if appropriate.    Also see  need for letter of medical necessity due to quantity dispensed is over the insurance monthly limitation for medication.      RN received call from [facility] pharmacy regarding above.    Phil Daniel RN, BSN, PHN  [facility]

## 2025-03-12 NOTE — TELEPHONE ENCOUNTER
Yuli message amphetamine-dextroamphetamine (ADDERALL) 30 MG tablet     February RX was deleted from the system, will need replacement.  PA needed for qty limit override.

## 2025-03-14 ENCOUNTER — TELEPHONE (OUTPATIENT)
Dept: FAMILY MEDICINE | Facility: CLINIC | Age: 35
End: 2025-03-14
Payer: MEDICARE

## 2025-03-14 NOTE — TELEPHONE ENCOUNTER
Routing message to provider.      Patient calling to check on status of adderall.    New script sent yesterday evening.    Do not see appeal letter written to appeal PA denial.    Amount exceeds limit of plan     Prior authorization has been denied. If the provider would like to appeal, please provide a letter of medical necessity and route back to the team. Otherwise, please discuss next steps with the patient if needed and close the encounter when finished.    Thank you!    Patty Soto, Patty Alejandre        Willing to write appeal letter?    Christine M Klisch, RN

## 2025-03-18 ENCOUNTER — OFFICE VISIT (OUTPATIENT)
Dept: FAMILY MEDICINE | Facility: CLINIC | Age: 35
End: 2025-03-18
Payer: MEDICARE

## 2025-03-18 VITALS
HEART RATE: 92 BPM | OXYGEN SATURATION: 96 % | RESPIRATION RATE: 23 BRPM | SYSTOLIC BLOOD PRESSURE: 108 MMHG | TEMPERATURE: 97.8 F | DIASTOLIC BLOOD PRESSURE: 72 MMHG

## 2025-03-18 DIAGNOSIS — Z00.00 ENCOUNTER FOR ANNUAL PHYSICAL EXAM: Primary | ICD-10-CM

## 2025-03-18 DIAGNOSIS — Z99.2 ANEMIA IN CHRONIC KIDNEY DISEASE, ON CHRONIC DIALYSIS (H): ICD-10-CM

## 2025-03-18 DIAGNOSIS — G60.8 SENSORY POLYNEUROPATHY: ICD-10-CM

## 2025-03-18 DIAGNOSIS — N18.6 ANEMIA IN CHRONIC KIDNEY DISEASE, ON CHRONIC DIALYSIS (H): ICD-10-CM

## 2025-03-18 DIAGNOSIS — F90.2 ATTENTION DEFICIT HYPERACTIVITY DISORDER (ADHD), COMBINED TYPE: ICD-10-CM

## 2025-03-18 DIAGNOSIS — M21.6X1 ACQUIRED BILATERAL PES CAVUS: ICD-10-CM

## 2025-03-18 DIAGNOSIS — M24.576 CONTRACTURE OF JOINT OF FOOT, UNSPECIFIED LATERALITY: ICD-10-CM

## 2025-03-18 DIAGNOSIS — M21.6X2 ACQUIRED BILATERAL PES CAVUS: ICD-10-CM

## 2025-03-18 DIAGNOSIS — M62.838 MUSCLE SPASM: ICD-10-CM

## 2025-03-18 DIAGNOSIS — D63.1 ANEMIA IN CHRONIC KIDNEY DISEASE, ON CHRONIC DIALYSIS (H): ICD-10-CM

## 2025-03-18 DIAGNOSIS — F10.11 ALCOHOL ABUSE, IN REMISSION: ICD-10-CM

## 2025-03-18 PROCEDURE — 3078F DIAST BP <80 MM HG: CPT | Performed by: FAMILY MEDICINE

## 2025-03-18 PROCEDURE — 3074F SYST BP LT 130 MM HG: CPT | Performed by: FAMILY MEDICINE

## 2025-03-18 PROCEDURE — G0438 PPPS, INITIAL VISIT: HCPCS | Performed by: FAMILY MEDICINE

## 2025-03-18 RX ORDER — CYCLOBENZAPRINE HCL 5 MG
TABLET ORAL
Qty: 60 TABLET | Refills: 5 | Status: SHIPPED | OUTPATIENT
Start: 2025-03-18

## 2025-03-18 SDOH — HEALTH STABILITY: PHYSICAL HEALTH: ON AVERAGE, HOW MANY DAYS PER WEEK DO YOU ENGAGE IN MODERATE TO STRENUOUS EXERCISE (LIKE A BRISK WALK)?: 0 DAYS

## 2025-03-18 ASSESSMENT — ANXIETY QUESTIONNAIRES
4. TROUBLE RELAXING: SEVERAL DAYS
3. WORRYING TOO MUCH ABOUT DIFFERENT THINGS: NOT AT ALL
8. IF YOU CHECKED OFF ANY PROBLEMS, HOW DIFFICULT HAVE THESE MADE IT FOR YOU TO DO YOUR WORK, TAKE CARE OF THINGS AT HOME, OR GET ALONG WITH OTHER PEOPLE?: VERY DIFFICULT
IF YOU CHECKED OFF ANY PROBLEMS ON THIS QUESTIONNAIRE, HOW DIFFICULT HAVE THESE PROBLEMS MADE IT FOR YOU TO DO YOUR WORK, TAKE CARE OF THINGS AT HOME, OR GET ALONG WITH OTHER PEOPLE: VERY DIFFICULT
7. FEELING AFRAID AS IF SOMETHING AWFUL MIGHT HAPPEN: NOT AT ALL
GAD7 TOTAL SCORE: 1
5. BEING SO RESTLESS THAT IT IS HARD TO SIT STILL: NOT AT ALL
7. FEELING AFRAID AS IF SOMETHING AWFUL MIGHT HAPPEN: NOT AT ALL
6. BECOMING EASILY ANNOYED OR IRRITABLE: NOT AT ALL
GAD7 TOTAL SCORE: 1
1. FEELING NERVOUS, ANXIOUS, OR ON EDGE: NOT AT ALL
2. NOT BEING ABLE TO STOP OR CONTROL WORRYING: NOT AT ALL
GAD7 TOTAL SCORE: 1

## 2025-03-18 ASSESSMENT — SOCIAL DETERMINANTS OF HEALTH (SDOH): HOW OFTEN DO YOU GET TOGETHER WITH FRIENDS OR RELATIVES?: NEVER

## 2025-03-18 ASSESSMENT — PATIENT HEALTH QUESTIONNAIRE - PHQ9
10. IF YOU CHECKED OFF ANY PROBLEMS, HOW DIFFICULT HAVE THESE PROBLEMS MADE IT FOR YOU TO DO YOUR WORK, TAKE CARE OF THINGS AT HOME, OR GET ALONG WITH OTHER PEOPLE: VERY DIFFICULT
SUM OF ALL RESPONSES TO PHQ QUESTIONS 1-9: 4
SUM OF ALL RESPONSES TO PHQ QUESTIONS 1-9: 4

## 2025-03-27 ENCOUNTER — OFFICE VISIT (OUTPATIENT)
Dept: UROLOGY | Facility: CLINIC | Age: 35
End: 2025-03-27
Attending: FAMILY MEDICINE
Payer: MEDICARE

## 2025-03-27 VITALS
WEIGHT: 174.16 LBS | DIASTOLIC BLOOD PRESSURE: 86 MMHG | HEIGHT: 67 IN | SYSTOLIC BLOOD PRESSURE: 132 MMHG | OXYGEN SATURATION: 98 % | BODY MASS INDEX: 27.34 KG/M2 | HEART RATE: 106 BPM

## 2025-03-27 DIAGNOSIS — A63.0 CONDYLOMA: ICD-10-CM

## 2025-03-27 DIAGNOSIS — N18.6 STAGE 5 CHRONIC KIDNEY DISEASE ON CHRONIC DIALYSIS (H): Primary | ICD-10-CM

## 2025-03-27 DIAGNOSIS — G62.9 NEUROPATHY: ICD-10-CM

## 2025-03-27 DIAGNOSIS — Z99.2 STAGE 5 CHRONIC KIDNEY DISEASE ON CHRONIC DIALYSIS (H): Primary | ICD-10-CM

## 2025-03-27 RX ORDER — PREGABALIN 300 MG/1
CAPSULE ORAL
COMMUNITY
Start: 2025-03-25

## 2025-03-27 ASSESSMENT — PAIN SCALES - GENERAL: PAINLEVEL_OUTOF10: NO PAIN (0)

## 2025-03-27 NOTE — LETTER
3/27/2025       RE: Dax Villareal  [address]  [place] [address]     Dear Colleague,    Thank you for referring your patient, Dax Villareal, to the [facility] [place] at [facility]. Please see a copy of my visit note below.    I am seeing Dax Villareal in consultation from Kole Gomez for evaluation of penile lumps.    HPI:  Dax Villareal is a 34 year old male with history of alcohol abuse, CKD  on hemodiysis x 4 years, liver cirrhosis,    He has some small skin lumps on the penile shaft.  There have been present for years.  Not recently sexually active.  States mother and sister have history of warts.  Lots of health issues with his multiple chronic medical problems.    PAST MEDICAL HX:  Past Medical History:   Diagnosis Date     ADHD 2008     Alcohol abuse, in remission      Alcoholic cirrhosis of liver with ascites (H)      Anemia in chronic kidney disease      Chronic pain      Current smoker      End stage renal disease (H)      Hypotension, unspecified hypotension type      Malnutrition      Metabolic acidosis      Necrotizing pancreatitis      Pancreatic insufficiency      Pericarditis      Recurrent pleural effusion      Sensory polyneuropathy        PAST SURG HX:  Past Surgical History:   Procedure Laterality Date     AV FISTULA PLACEMENT, BRACHIOCEPHALIC Left 05/11/2022    [facility]     COMBINED ESOPHAGOSCOPY, GASTROSCOPY, DUODENOSCOPY (EGD), TISSUE APPOSI N/A 09/20/2021    Procedure: ESOPHAGOGASTRODUODENOSCOPY WITH SUTURE FISTULA CLOSURE, argon plasma coagulation;  Surgeon: Jose Quigley MD;  [facility]: [facility]     ENDOSCOPIC INSERTION TUBE GASTROSTOMY N/A 07/13/2021    Procedure: Upper endoscopy, INSERTION, PEG-J TUBE and Gastropexy;  Surgeon: Rayshawn Will MD;  [facility]     ENDOSCOPIC INSERTION TUBE GASTROSTOMY  09/13/2021    Procedure: PEG/J PLACEMENT.;  Surgeon: Rayshawn Will MD;   [facility]     ENDOSCOPIC ULTRASOUND UPPER GASTROINTESTINAL TRACT (GI) N/A 07/21/2021    Procedure: ENDOSCOPIC ULTRASOUND, ESOPHAGOSCOPY / UPPER GASTROINTESTINAL TRACT (GI) with cyst gastrostomy, dilation;  Surgeon: Guru Yecenia Chacko MD;  [facility]     ENDOSCOPIC ULTRASOUND, ESOPHAGOSCOPY, GASTROSCOPY, DUODENOSCOPY (EGD), NECROSECTOMY N/A 08/11/2021    Procedure: ESOPHAGOGASTRODUODENOSCOPY (EGD) with necrosectomy, stent exchange.;  Surgeon: Amadou Beasley MD;  [facility]     ESOPHAGOSCOPY, GASTROSCOPY, DUODENOSCOPY (EGD), COMBINED N/A 07/28/2021    Procedure: ESOPHAGOGASTRODUODENOSCOPY (EGD), gastrostomy apposition, Necrosectomy, stent exchange.;  Surgeon: Rayshawn Will MD;  [facility]     ESOPHAGOSCOPY, GASTROSCOPY, DUODENOSCOPY (EGD), COMBINED N/A 08/06/2021    Procedure: ESOPHAGOGASTRODUODENOSCOPY (EGD) with necrosectomy, and stents exchanged;  Surgeon: Jose Quigley MD;  [facility]     ESOPHAGOSCOPY, GASTROSCOPY, DUODENOSCOPY (EGD), COMBINED Left 09/13/2021    Procedure: ESOPHAGOGASTRODUODENOSCOPY (EGD), GASTROSTOMY TUBE REMOVAL, FISTULAR CLOSURE ENDOSCOPIC.;  Surgeon: Rayshawn Will MD;  [facility]     IR ABSCESS TUBE CHANGE  07/14/2021     IR NG TUBE PLACEMENT REQ RAD & FLUORO  10/26/2021     IR PARACENTESIS  07/21/2021     IR SINOGRAM INJECTION DIAGNOSTIC  08/25/2021     IR THORACENTESIS  02/05/2022     ORTHOPEDIC SURGERY Right     fracture repair     REPLACE GASTROSTOMY TUBE, PERCUTANEOUS N/A 07/28/2021    Procedure: gastro-jejunostomy tube exchange;  Surgeon: Rayshawn Will MD;  [facility]        FAMILY HX:  Family History   Problem Relation Age of Onset     Attention Deficit Disorder Mother      Alcoholism Mother      Cancer Mother      Alcoholism Father      Alcoholism Sister      Alcoholism Brother        SOCIAL HX:  Social History     Tobacco Use     Smoking status: Every Day     Current packs/day: 0.25     Types:  "Cigarettes     Passive exposure: Current     Smokeless tobacco: Never     Tobacco comments:     10/22/24 more or less around half a pack of cigs/day.          8/24/2021 Patient did not want to discuss quitting but would like 4 mg lozenge to use during admission. Patient also accepted \"Quitting for Good\" workbook   Vaping Use     Vaping status: Never Used     Passive vaping exposure: Yes   Substance Use Topics     Alcohol use: Not Currently     Comment: \"no alcohol for over 1 year ago\" per pt on 5/12/22     Drug use: No       MEDICATIONS:  Current Outpatient Medications   Medication Sig Dispense Refill     amphetamine-dextroamphetamine (ADDERALL) 10 MG tablet Take one tab at noon 30 tablet 0     [START ON 4/13/2025] amphetamine-dextroamphetamine (ADDERALL) 10 MG tablet Take one tab at noon 30 tablet 0     [START ON 5/13/2025] amphetamine-dextroamphetamine (ADDERALL) 10 MG tablet Take one tab at noon 30 tablet 0     amphetamine-dextroamphetamine (ADDERALL) 30 MG tablet One tab bid  ( dose adjusted ) 60 tablet 0     [START ON 4/14/2025] amphetamine-dextroamphetamine (ADDERALL) 30 MG tablet One tab bid  ( dose adjusted ) 60 tablet 0     [START ON 5/12/2025] amphetamine-dextroamphetamine (ADDERALL) 30 MG tablet One tab bid  ( dose adjusted ) 60 tablet 0     baclofen (LIORESAL) 10 MG tablet Take 1 tablet (10 mg) by mouth 3 times daily 90 tablet 2     buprenorphine (BUTRANS) 5 MCG/HR WK patch Place 1 patch onto the skin every 7 days.       ciclopirox (LOPROX) 0.77 % cream Apply topically 2 times daily To feet and toenails. 90 g 5     cyclobenzaprine (FLEXERIL) 5 MG tablet 1-2 tab at bedtime as needed 60 tablet 5     oxyCODONE (ROXICODONE) 5 MG tablet Take 1 tablet (5 mg) by mouth every 6 hours as needed for pain 6 tablet 0     pregabalin (LYRICA) 300 MG capsule take 1 capsule by mouth every day at bedtime       No current facility-administered medications for this visit.       ALLERGIES:  Patient has no known " "allergies.      GENERAL PHYSICAL EXAM:     /86   Pulse 106   Ht 1.702 m (5' 7\")   Wt 79 kg (174 lb 2.6 oz)   SpO2 98%   BMI 27.28 kg/m     Constitutional: No acute distress. Well nourished.   PSYCH: normal mood and affect.  NEURO: normal gait, no focal deficits.   EYES: anicteric, EOMI, PERR.  CARDIOPULMONARY: breathing non-labored, pulse regular rate/rhythm, no peripheral edema.  GI: Abdomen soft, non-tender, nondistended   MUSCULOSKELETAL: normal limb proportions, no muscle wasting, no contractures.  Dialysis fistula LUE.  SKIN: Normal virilized hair distribution, no lesions, warts or rashes over genitalia, abdomen extremities or face.  HEME/LYMPH: no ecchymosis, no lymphadenopathy in groin or neck, no lymphedema.     EXAM:  Phallus  circumcised, meatus adequate, no plaques palpated.   There are 8-10 verrucous nodules on the penile shaft, more so right side. These range in size from 4-6mm or so.  There is a larger hyperpigmented flat lesion at the left penile dorsal base that appears to be a mole.  Testes ++, nontender     Prostate exam: deferred     Imaging/labs:  Lab Results   Component Value Date    CR 2.41 02/11/2025    CR 2.68 02/26/2024    CR 3.10 10/19/2023    CR 3.20 07/11/2021    CR 2.21 07/10/2021    CR 3.37 07/09/2021     Lab Results   Component Value Date    PSA 0.49 06/28/2022       ASSESSMENT:   Penile shaft condylomata.  Possible NGB.  He's had some voiding study in the past- UDS or VUCG, results not available.  History of neuropathy.  Will check PVR.  ESRD on HD.  Cr zuleima above.  He asked about fertility.  Discussed his multiple medical problems may affect fertility.  He will consider fertility testing in the future, declines at this time.        PLAN:  PVR today = 4cc.  Not retaining urine.  Derm referral for cryo of genital warts.  Advised abstain from sexual activity if lesions present.  I'm happy to see him back in the future as needed.       Copied cc to Consulting provider " Kole Gomez        Thank-you for the kind consultation.  Thierno Ho MD     Urological Surgeon          --------------------------------------------------------------------------------------------------------------------   Additional Coding Information:    Problems:  4 -- two or more stable chronic illnesses    Data Reviewed  3 or more studies reviewed, as listed above     Tests ordered/pending: PVR     Notes from other providers reviewed: June 2022 saw urology Dr. Gregg 6/14/22 for nonspecific penile pain.  Cystoscopy was recommended.      Level of risk:  3 -- low risk (e.g., OTC medication or observation, minor surgery without risks)    Time spent:  25 minutes spent on the date of the encounter doing chart review, history and exam, documentation and further activities per the note              Again, thank you for allowing me to participate in the care of your patient.      Sincerely,    Thierno Ho MD

## 2025-03-27 NOTE — PROGRESS NOTES
I am seeing Dax Villareal in consultation from Kole Gomez for evaluation of penile lumps.    HPI:  Dax Villareal is a 34 year old male with history of alcohol abuse, CKD  on hemodiysis x 4 years, liver cirrhosis,    He has some small skin lumps on the penile shaft.  There have been present for years.  Not recently sexually active.  States mother and sister have history of warts.  Lots of health issues with his multiple chronic medical problems.    PAST MEDICAL HX:  Past Medical History:   Diagnosis Date    ADHD 2008    Alcohol abuse, in remission     Alcoholic cirrhosis of liver with ascites (H)     Anemia in chronic kidney disease     Chronic pain     Current smoker     End stage renal disease (H)     Hypotension, unspecified hypotension type     Malnutrition     Metabolic acidosis     Necrotizing pancreatitis     Pancreatic insufficiency     Pericarditis     Recurrent pleural effusion     Sensory polyneuropathy        PAST SURG HX:  Past Surgical History:   Procedure Laterality Date    AV FISTULA PLACEMENT, BRACHIOCEPHALIC Left 05/11/2022    [facility]    COMBINED ESOPHAGOSCOPY, GASTROSCOPY, DUODENOSCOPY (EGD), TISSUE APPOSI N/A 09/20/2021    Procedure: ESOPHAGOGASTRODUODENOSCOPY WITH SUTURE FISTULA CLOSURE, argon plasma coagulation;  Surgeon: Jose Quigley MD;  [facility]    ENDOSCOPIC INSERTION TUBE GASTROSTOMY N/A 07/13/2021    Procedure: Upper endoscopy, INSERTION, PEG-J TUBE and Gastropexy;  Surgeon: Rayshawn Will MD;  [facility]    ENDOSCOPIC INSERTION TUBE GASTROSTOMY  09/13/2021    Procedure: PEG/J PLACEMENT.;  Surgeon: Rayshawn Will MD;  [facility]    ENDOSCOPIC ULTRASOUND UPPER GASTROINTESTINAL TRACT (GI) N/A 07/21/2021    Procedure: ENDOSCOPIC ULTRASOUND, ESOPHAGOSCOPY / UPPER GASTROINTESTINAL TRACT (GI) with cyst gastrostomy, dilation;  Surgeon: Guru Yecenia Chacko MD;  [facility]    ENDOSCOPIC ULTRASOUND, ESOPHAGOSCOPY, GASTROSCOPY,  "DUODENOSCOPY (EGD), NECROSECTOMY N/A 08/11/2021    Procedure: ESOPHAGOGASTRODUODENOSCOPY (EGD) with necrosectomy, stent exchange.;  Surgeon: Amadou Beasley MD;  [facility]    ESOPHAGOSCOPY, GASTROSCOPY, DUODENOSCOPY (EGD), COMBINED N/A 07/28/2021    Procedure: ESOPHAGOGASTRODUODENOSCOPY (EGD), gastrostomy apposition, Necrosectomy, stent exchange.;  Surgeon: Rayshawn Will MD;  [facility]    ESOPHAGOSCOPY, GASTROSCOPY, DUODENOSCOPY (EGD), COMBINED N/A 08/06/2021    Procedure: ESOPHAGOGASTRODUODENOSCOPY (EGD) with necrosectomy, and stents exchanged;  Surgeon: Jose Quigley MD;  [facility]    ESOPHAGOSCOPY, GASTROSCOPY, DUODENOSCOPY (EGD), COMBINED Left 09/13/2021    Procedure: ESOPHAGOGASTRODUODENOSCOPY (EGD), GASTROSTOMY TUBE REMOVAL, FISTULAR CLOSURE ENDOSCOPIC.;  Surgeon: Rayshawn Will MD;  [facility]    IR ABSCESS TUBE CHANGE  07/14/2021    IR NG TUBE PLACEMENT REQ RAD & FLUORO  10/26/2021    IR PARACENTESIS  07/21/2021    IR SINOGRAM INJECTION DIAGNOSTIC  08/25/2021    IR THORACENTESIS  02/05/2022    ORTHOPEDIC SURGERY Right     fracture repair    REPLACE GASTROSTOMY TUBE, PERCUTANEOUS N/A 07/28/2021    Procedure: gastro-jejunostomy tube exchange;  Surgeon: Rayshawn Will MD;  [facility]        FAMILY HX:  Family History   Problem Relation Age of Onset    Attention Deficit Disorder Mother     Alcoholism Mother     Cancer Mother     Alcoholism Father     Alcoholism Sister     Alcoholism Brother        SOCIAL HX:  Social History     Tobacco Use    Smoking status: Every Day     Current packs/day: 0.25     Types: Cigarettes     Passive exposure: Current    Smokeless tobacco: Never    Tobacco comments:     10/22/24 more or less around half a pack of cigs/day.          8/24/2021 Patient did not want to discuss quitting but would like 4 mg lozenge to use during admission. Patient also accepted \"Quitting for Good\" workbook   Vaping Use    Vaping status: Never Used " "   Passive vaping exposure: Yes   Substance Use Topics    Alcohol use: Not Currently     Comment: \"no alcohol for over 1 year ago\" per pt on 5/12/22    Drug use: No       MEDICATIONS:  Current Outpatient Medications   Medication Sig Dispense Refill    amphetamine-dextroamphetamine (ADDERALL) 10 MG tablet Take one tab at noon 30 tablet 0    [START ON 4/13/2025] amphetamine-dextroamphetamine (ADDERALL) 10 MG tablet Take one tab at noon 30 tablet 0    [START ON 5/13/2025] amphetamine-dextroamphetamine (ADDERALL) 10 MG tablet Take one tab at noon 30 tablet 0    amphetamine-dextroamphetamine (ADDERALL) 30 MG tablet One tab bid  ( dose adjusted ) 60 tablet 0    [START ON 4/14/2025] amphetamine-dextroamphetamine (ADDERALL) 30 MG tablet One tab bid  ( dose adjusted ) 60 tablet 0    [START ON 5/12/2025] amphetamine-dextroamphetamine (ADDERALL) 30 MG tablet One tab bid  ( dose adjusted ) 60 tablet 0    baclofen (LIORESAL) 10 MG tablet Take 1 tablet (10 mg) by mouth 3 times daily 90 tablet 2    buprenorphine (BUTRANS) 5 MCG/HR WK patch Place 1 patch onto the skin every 7 days.      ciclopirox (LOPROX) 0.77 % cream Apply topically 2 times daily To feet and toenails. 90 g 5    cyclobenzaprine (FLEXERIL) 5 MG tablet 1-2 tab at bedtime as needed 60 tablet 5    oxyCODONE (ROXICODONE) 5 MG tablet Take 1 tablet (5 mg) by mouth every 6 hours as needed for pain 6 tablet 0    pregabalin (LYRICA) 300 MG capsule take 1 capsule by mouth every day at bedtime       No current facility-administered medications for this visit.       ALLERGIES:  Patient has no known allergies.      GENERAL PHYSICAL EXAM:     /86   Pulse 106   Ht 1.702 m (5' 7\")   Wt 79 kg (174 lb 2.6 oz)   SpO2 98%   BMI 27.28 kg/m     Constitutional: No acute distress. Well nourished.   PSYCH: normal mood and affect.  NEURO: normal gait, no focal deficits.   EYES: anicteric, EOMI, PERR.  CARDIOPULMONARY: breathing non-labored, pulse regular rate/rhythm, no " peripheral edema.  GI: Abdomen soft, non-tender, nondistended   MUSCULOSKELETAL: normal limb proportions, no muscle wasting, no contractures.  Dialysis fistula LUE.  SKIN: Normal virilized hair distribution, no lesions, warts or rashes over genitalia, abdomen extremities or face.  HEME/LYMPH: no ecchymosis, no lymphadenopathy in groin or neck, no lymphedema.     EXAM:  Phallus  circumcised, meatus adequate, no plaques palpated.   There are 8-10 verrucous nodules on the penile shaft, more so right side. These range in size from 4-6mm or so.  There is a larger hyperpigmented flat lesion at the left penile dorsal base that appears to be a mole.  Testes ++, nontender     Prostate exam: deferred     Imaging/labs:  Lab Results   Component Value Date    CR 2.41 02/11/2025    CR 2.68 02/26/2024    CR 3.10 10/19/2023    CR 3.20 07/11/2021    CR 2.21 07/10/2021    CR 3.37 07/09/2021     Lab Results   Component Value Date    PSA 0.49 06/28/2022       ASSESSMENT:   Penile shaft condylomata.  Possible NGB.  He's had some voiding study in the past- UDS or VUCG, results not available.  History of neuropathy.  Will check PVR.  ESRD on HD.  Cr zuleima above.  He asked about fertility.  Discussed his multiple medical problems may affect fertility.  He will consider fertility testing in the future, declines at this time.        PLAN:  PVR today = 4cc.  Not retaining urine.  Derm referral for cryo of genital warts.  Advised abstain from sexual activity if lesions present.  I'm happy to see him back in the future as needed.       Copied cc to Consulting provider Kole Gomez        Thank-you for the kind consultation.  Thierno Ho MD     Urological Surgeon          --------------------------------------------------------------------------------------------------------------------   Additional Coding Information:    Problems:  4 -- two or more stable chronic illnesses    Data Reviewed  3 or more studies reviewed, as listed above      Tests ordered/pending: PVR     Notes from other providers reviewed: June 2022 saw urology Dr. Gregg 6/14/22 for nonspecific penile pain.  Cystoscopy was recommended.      Level of risk:  3 -- low risk (e.g., OTC medication or observation, minor surgery without risks)    Time spent:  25 minutes spent on the date of the encounter doing chart review, history and exam, documentation and further activities per the note

## 2025-03-27 NOTE — NURSING NOTE
"Chief Complaint   Patient presents with    Consult For     2 tiny lumps on shaft of penis        Blood pressure 132/86, pulse 106, height 1.702 m (5' 7\"), weight 79 kg (174 lb 2.6 oz), SpO2 98%. Body mass index is 27.28 kg/m .    Patient Active Problem List   Diagnosis    Necrotizing pancreatitis    ESRD (end stage renal disease) on dialysis (H)    Cirrhosis of liver (H)    Muscle weakness    Contracture of joint of foot, unspecified laterality    DIMITRI (generalized anxiety disorder)    Alcohol abuse, in remission    Chronic pain    Pancreatic insufficiency    Anemia in chronic kidney disease    Acquired bilateral pes cavus    Chronic pain of both feet    ADHD    Lumbar radiculopathy    Current moderate episode of major depressive disorder (H)    Sensory polyneuropathy       No Known Allergies    Current Outpatient Medications   Medication Sig Dispense Refill    amphetamine-dextroamphetamine (ADDERALL) 10 MG tablet Take one tab at noon 30 tablet 0    [START ON 4/13/2025] amphetamine-dextroamphetamine (ADDERALL) 10 MG tablet Take one tab at noon 30 tablet 0    [START ON 5/13/2025] amphetamine-dextroamphetamine (ADDERALL) 10 MG tablet Take one tab at noon 30 tablet 0    amphetamine-dextroamphetamine (ADDERALL) 30 MG tablet One tab bid  ( dose adjusted ) 60 tablet 0    [START ON 4/14/2025] amphetamine-dextroamphetamine (ADDERALL) 30 MG tablet One tab bid  ( dose adjusted ) 60 tablet 0    [START ON 5/12/2025] amphetamine-dextroamphetamine (ADDERALL) 30 MG tablet One tab bid  ( dose adjusted ) 60 tablet 0    baclofen (LIORESAL) 10 MG tablet Take 1 tablet (10 mg) by mouth 3 times daily 90 tablet 2    buprenorphine (BUTRANS) 5 MCG/HR WK patch Place 1 patch onto the skin every 7 days.      ciclopirox (LOPROX) 0.77 % cream Apply topically 2 times daily To feet and toenails. 90 g 5    cyclobenzaprine (FLEXERIL) 5 MG tablet 1-2 tab at bedtime as needed 60 tablet 5    oxyCODONE (ROXICODONE) 5 MG tablet Take 1 tablet (5 mg) by " "mouth every 6 hours as needed for pain 6 tablet 0    pregabalin (LYRICA) 300 MG capsule take 1 capsule by mouth every day at bedtime         Social History     Tobacco Use    Smoking status: Every Day     Current packs/day: 0.25     Types: Cigarettes     Passive exposure: Current    Smokeless tobacco: Never    Tobacco comments:     10/22/24 more or less around half a pack of cigs/day.          8/24/2021 Patient did not want to discuss quitting but would like 4 mg lozenge to use during admission. Patient also accepted \"Quitting for Good\" workbook   Vaping Use    Vaping status: Never Used    Passive vaping exposure: Yes   Substance Use Topics    Alcohol use: Not Currently     Comment: \"no alcohol for over 1 year ago\" per pt on 5/12/22    Drug use: No       Rosie Mathis  3/27/2025  11:39 AM     "

## 2025-04-08 ENCOUNTER — THERAPY VISIT (OUTPATIENT)
Dept: PHYSICAL THERAPY | Facility: CLINIC | Age: 35
End: 2025-04-08
Attending: FAMILY MEDICINE
Payer: MEDICARE

## 2025-04-08 DIAGNOSIS — M79.671 CHRONIC PAIN OF BOTH FEET: Primary | ICD-10-CM

## 2025-04-08 DIAGNOSIS — G89.29 CHRONIC PAIN OF BOTH FEET: Primary | ICD-10-CM

## 2025-04-08 DIAGNOSIS — M79.672 CHRONIC PAIN OF BOTH FEET: Primary | ICD-10-CM

## 2025-04-08 DIAGNOSIS — M24.576 CONTRACTURE OF JOINT OF FOOT, UNSPECIFIED LATERALITY: ICD-10-CM

## 2025-04-08 DIAGNOSIS — M21.6X2 ACQUIRED BILATERAL PES CAVUS: ICD-10-CM

## 2025-04-08 DIAGNOSIS — M21.6X1 ACQUIRED BILATERAL PES CAVUS: ICD-10-CM

## 2025-04-08 PROCEDURE — 97110 THERAPEUTIC EXERCISES: CPT | Mod: GP | Performed by: PHYSICAL THERAPIST

## 2025-04-08 PROCEDURE — 97530 THERAPEUTIC ACTIVITIES: CPT | Mod: GP | Performed by: PHYSICAL THERAPIST

## 2025-04-08 PROCEDURE — 97163 PT EVAL HIGH COMPLEX 45 MIN: CPT | Mod: GP | Performed by: PHYSICAL THERAPIST

## 2025-04-08 ASSESSMENT — ACTIVITIES OF DAILY LIVING (ADL)
PUTTING_ON_YOUR_SHOES_OR_SOCKS: A LITTLE BIT OF DIFFICULTY
SQUATTING: A LITTLE BIT OF DIFFICULTY
ANY_OF_YOUR_USUAL_WORK,_HOUSEWORK_OR_SCHOOL_ACTIVITIES: EXTREME DIFFICULTY OR UNABLE TO PERFORM ACTIVITY
WALKING_2_BLOCKS: EXTREME DIFFICULTY OR UNABLE TO PERFORM ACTIVITY
STANDING_FOR_1_HOUR: EXTREME DIFFICULTY OR UNABLE TO PERFORM ACTIVITY
SHOPPING: EXTREME DIFFICULTY OR UNABLE TO PERFORM ACTIVITY
PLEASE_INDICATE_YOR_PRIMARY_REASON_FOR_REFERRAL_TO_THERAPY:: FOOT AND/OR ANKLE
LIFTING_AN_OBJECT,_LIKE_A_BAG_OF_GROCERIES_FROM_THE_FLOOR: A LITTLE BIT OF DIFFICULTY
LEFS_RAW_SCORE: 0
RUNNING_ON_EVEN_GROUND: EXTREME DIFFICULTY OR UNABLE TO PERFORM ACTIVITY
MAKING_SHARP_TURNS_WHILE_RUNNING_FAST: EXTREME DIFFICULTY OR UNABLE TO PERFORM ACTIVITY
GETTING_INTO_AND_OUT_OF_A_BATH: A LITTLE BIT OF DIFFICULTY
WALKING_A_MILE: EXTREME DIFFICULTY OR UNABLE TO PERFORM ACTIVITY
WALKING_BETWEEN_ROOMS: A LITTLE BIT OF DIFFICULTY
LEFS_SCORE(%): 0
ROLLING_OVER_IN_BED: NO DIFFICULTY
PERFORMING_HEAVY_ACTIVITIES_AROUND_YOUR_HOME: QUITE A BIT OF DIFFICULTY
GOING_UP_OR_DOWN_10_STAIRS: MODERATE DIFFICULTY
GETTING_INTO_OR_OUT_OF_A_CAR: MODERATE DIFFICULTY
YOUR_USUAL_HOBBIES,_RECREATIONAL_OR_SPORTING_ACTIVITIES: EXTREME DIFFICULTY OR UNABLE TO PERFORM ACTIVITY
RUNNING_ON_UNEVEN_GROUND: EXTREME DIFFICULTY OR UNABLE TO PERFORM ACTIVITY
SITTING_FOR_1_HOUR: A LITTLE BIT OF DIFFICULTY
PERFORMING_LIGHT_ACTIVITIES_AROUND_YOUR_HOME: A LITTLE BIT OF DIFFICULTY

## 2025-04-08 NOTE — PROGRESS NOTES
PHYSICAL THERAPY EVALUATION  Type of Visit: Evaluation       Fall Risk Screen:  Fall screen completed by: PT  Have you fallen 2 or more times in the past year?: No  Have you fallen and had an injury in the past year?: No  Is patient a fall risk?: No    Subjective         Presenting condition or subjective complaint: Ankle-Toe issues of low range of motion, tightness, and possible arthritis.      Reports this may be due to the sheet placement when he was in the hospital in 2021.  States he spent 8 months in the hospital with the sheet placement on the feet.  Intubated, coma for 6 days, 17 operations then went to [facility] with cardiac issues/water on the heart.  Then was sent to transitional care.  Reports challenging experience overall.      Reports he can tough it out and make it 2 blocks with a walker.  If he goes to the mall and takes breaks he is ok.  The biggest issue is to get up and go, It feels like I am walking on my toes.  Has been reliant on scooter and WC for mobility.      Pt reports he working on a carbon fiber splint for ambulation to help with ambulation tolerance.    Pt reports he now lives in an apartment and has been without a PCA for months as his prior PCA was taken to court for wage theft.  Reports he is getting by with social security and disability but making 1/2 of what he made on employment.          Date of onset: 03/18/25 (MD order)    Relevant medical history: Arthritis; Bladder or bowel problems; Dizziness; Foot drop; History of fractures; Incontinence; Kidney disease; Severe dizziness; Smoking .  Dates & types of surgery:      Prior diagnostic imaging/testing results: MRI; X-ray     Prior therapy history for the same diagnosis, illness or injury: Yes Physical therapy-simple exercise.    Prior Level of Function  Transfers:   Ambulation:   ADL:   IADL:     Living Environment  Social support: Alone   Type of home: Apartment/condo   Stairs to enter the home: No       Ramp: No    Stairs inside the home: No       Help at home: None  Equipment owned: Straight Cane; Walker with wheels; Power wheelchair or scooter; Grab bars.  Band exercises for the foot, does some walking. Pt has access to exercise bike.       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    Patient goals for therapy: More range, less tension, and balance.    Pain assessment: Pain present     Objective   FOOT/ANKLE EVALUATION  PAIN: Pain Level at Rest: 6/10  Pain Level with Use: 8/10  Pain Location: big toe is always at an 6/10, gets up to an 8/10.  Reports improved with hot water.  Bands help to loosen with the medicaiton    Pain Quality: feels like turf toe, can feel like it is always tight and stiff pain   Pain Frequency: constant  Pain is Worst: daytime or when he first puts weight on the foot  Pain is Exacerbated By: loading  Pain is Relieved By: hot water, stretching, bands, medication  Pain Progression: Unchanged      INTEGUMENTARY (edema, incisions):   POSTURE:  Standing: rounded shoulders,  lumbar lordosis.  L foot WB through lateral side of the foot- pes cavus, gastroc atrophy present B.   Hammer toe presence of great toes B.    GAIT:   Weightbearing Status: WBAT,    Assistive Device(s): Scooter, WW at home  Gait Deviations:  WB through lateral side of the foot with CL trunk lean as compensatory patterning     BALANCE/PROPRIOCEPTION:  SLS <3 sec B.  Painful with loading on the left side.    WEIGHT BEARING ALIGNMENT:   NON-WEIGHTBEARING ALIGNMENT:    ROM:   (Degrees) Left AROM Left PROM  Right AROM Right PROM   Ankle Dorsiflexion -15 from neutral    -12 from neutral    Ankle Plantarflexion 75 deg    80 deg    Ankle Inversion 36   41   Ankle Eversion 0   10   Great Toe Flexion       Great Toe Extension       Pain:   End feel:     STRENGTH:  MMT: seated strength hip flexion 5/5 B; knee flexion/ext 5/5 B; hip ER 4/5,  L 5/5, IR 5/5 B.  Ankle/foot:   FLEXIBILITY:   SPECIAL TESTS:   FUNCTIONAL TESTS:   PALPATION:  Warmth through B ankle foot; knees very cool to touch.    JOINT MOBILITY:     Assessment & Plan   CLINICAL IMPRESSIONS  Medical Diagnosis: Acquired bilateral pes cavus  Contracture of joint of foot, unspecified laterality    Treatment Diagnosis: B foot pain   Impression/Assessment: Patient is a 34 year old male with B foot/ankle complaints.  The following significant findings have been identified: Pain, Decreased ROM/flexibility, Decreased joint mobility, Decreased strength, Impaired balance, Decreased proprioception, Impaired sensation, Inflammation, Impaired gait, Impaired muscle performance, Decreased activity tolerance, and Impaired posture. These impairments interfere with their ability to perform self care tasks, work tasks, recreational activities, household chores, household mobility, and community mobility as compared to previous level of function.     Clinical Decision Making (Complexity):  Clinical Presentation: Unstable/Unpredictable   Clinical Presentation Rationale: based on medical and personal factors listed in PT evaluation  Clinical Decision Making (Complexity): High complexity    PLAN OF CARE  Treatment Interventions:  Modalities: Cupping, Dry Needling, E-stim, Paraffin  Interventions: Gait Training, Manual Therapy, Neuromuscular Re-education, Therapeutic Activity, Therapeutic Exercise    Long Term Goals     PT Goal 1  Goal Identifier: ROM  Goal Description: Pt to demonstrate AROM DF to -5 deg B  Rationale: to maximize safety and independence with performance of ADLs and functional tasks  Target Date: 06/06/25  PT Goal 2  Goal Identifier: Balance  Goal Description: Pt to demonstrate 10 sec single leg balance EO  Rationale: to maximize safety and independence with performance of ADLs and functional tasks  Target Date: 06/06/25  PT Goal 3  Goal Identifier: Exercise management  Goal Description: Pt to  demonstrate independent use of machines/set up for home management  Rationale: to maximize safety and independence with performance of ADLs and functional tasks  Target Date: 06/06/25      Frequency of Treatment: 1x/week  Duration of Treatment: 2 months    Recommended Referrals to Other Professionals: Physical Therapy  Education Assessment:   Learner/Method: Patient;Pictures/Video;Demonstration;Listening  Education Comments: Pt reports he needs a PCA to do his stretching and assist with home cares.    Risks and benefits of evaluation/treatment have been explained.   Patient/Family/caregiver agrees with Plan of Care.     Evaluation Time:     PT Eval, High Complexity Minutes (29696): 15       Signing Clinician: Uzma Bustamante PT        [facility]                                                                                   OUTPATIENT PHYSICAL THERAPY      PLAN OF TREATMENT FOR OUTPATIENT REHABILITATION   Patient's Last Name, First Name, M.IAscencion  Dax Villareal YOB: 1990   Provider's Name   [facility]   Medical Record No.  [id number]     Onset Date: 03/18/25 (MD order)  Start of Care Date: 04/08/25     Medical Diagnosis:  Acquired bilateral pes cavus  Contracture of joint of foot, unspecified laterality      PT Treatment Diagnosis:  B foot pain Plan of Treatment  Frequency/Duration: 1x/week/ 2 months    Certification date from 04/08/25 to 06/06/25         See note for plan of treatment details and functional goals     Uzma Bustamante PT                         I CERTIFY THE NEED FOR THESE SERVICES FURNISHED UNDER        THIS PLAN OF TREATMENT AND WHILE UNDER MY CARE     (Physician attestation of this document indicates review and certification of the therapy plan).              Referring Provider:  Kole Gomez    Initial Assessment  See Epic Evaluation- Start of Care Date: 04/08/25

## 2025-05-06 ENCOUNTER — TELEPHONE (OUTPATIENT)
Dept: DERMATOLOGY | Facility: CLINIC | Age: 35
End: 2025-05-06
Payer: MEDICARE

## 2025-05-06 ENCOUNTER — TELEPHONE (OUTPATIENT)
Dept: PHYSICAL THERAPY | Facility: CLINIC | Age: 35
End: 2025-05-06
Payer: MEDICARE

## 2025-05-06 NOTE — TELEPHONE ENCOUNTER
Health Call Center    Phone Message    May a detailed message be left on voicemail: yes     Reason for Call: Other: Scheduled pt from referral. In March Dr. Ho stated no changes, schedule next available. Pt called in today and I was able to find him a sooner appt in . Dax states that the lesion is changing in color and size. Please review chart and call him to discuss. He is also concerned about the transplant. Thank you.         Action Taken: Message routed to:  Adult Clinics: Dermatology p [id number]    Travel Screening: Not Applicable     Date of Service:

## 2025-05-06 NOTE — TELEPHONE ENCOUNTER
Writer called pt, no answer. Left message for pt to return our call at [phone].     MG does not have anything sooner at this time. 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 Can wait til appointment in September.    Mago Sanchez RN on 5/6/2025 at 4:08 PM

## 2025-05-07 ENCOUNTER — THERAPY VISIT (OUTPATIENT)
Dept: PHYSICAL THERAPY | Facility: CLINIC | Age: 35
End: 2025-05-07
Payer: MEDICARE

## 2025-05-07 DIAGNOSIS — M79.671 CHRONIC PAIN OF BOTH FEET: ICD-10-CM

## 2025-05-07 DIAGNOSIS — G89.29 CHRONIC PAIN OF BOTH FEET: ICD-10-CM

## 2025-05-07 DIAGNOSIS — M21.6X1 ACQUIRED BILATERAL PES CAVUS: Primary | ICD-10-CM

## 2025-05-07 DIAGNOSIS — M24.576 CONTRACTURE OF JOINT OF FOOT, UNSPECIFIED LATERALITY: ICD-10-CM

## 2025-05-07 DIAGNOSIS — M79.672 CHRONIC PAIN OF BOTH FEET: ICD-10-CM

## 2025-05-07 DIAGNOSIS — M21.6X2 ACQUIRED BILATERAL PES CAVUS: Primary | ICD-10-CM

## 2025-05-07 PROCEDURE — 97140 MANUAL THERAPY 1/> REGIONS: CPT | Mod: GP | Performed by: PHYSICAL THERAPIST

## 2025-05-07 PROCEDURE — 97110 THERAPEUTIC EXERCISES: CPT | Mod: GP | Performed by: PHYSICAL THERAPIST

## 2025-05-13 ENCOUNTER — THERAPY VISIT (OUTPATIENT)
Dept: PHYSICAL THERAPY | Facility: CLINIC | Age: 35
End: 2025-05-13
Payer: MEDICARE

## 2025-05-13 DIAGNOSIS — M24.576 CONTRACTURE OF JOINT OF FOOT, UNSPECIFIED LATERALITY: ICD-10-CM

## 2025-05-13 DIAGNOSIS — G89.29 CHRONIC PAIN OF BOTH FEET: ICD-10-CM

## 2025-05-13 DIAGNOSIS — M79.672 CHRONIC PAIN OF BOTH FEET: ICD-10-CM

## 2025-05-13 DIAGNOSIS — M21.6X2 ACQUIRED BILATERAL PES CAVUS: Primary | ICD-10-CM

## 2025-05-13 DIAGNOSIS — M79.671 CHRONIC PAIN OF BOTH FEET: ICD-10-CM

## 2025-05-13 DIAGNOSIS — M21.6X1 ACQUIRED BILATERAL PES CAVUS: Primary | ICD-10-CM

## 2025-05-13 PROCEDURE — 97110 THERAPEUTIC EXERCISES: CPT | Mod: GP | Performed by: PHYSICAL THERAPIST

## 2025-05-13 PROCEDURE — 97140 MANUAL THERAPY 1/> REGIONS: CPT | Mod: GP | Performed by: PHYSICAL THERAPIST

## 2025-05-14 ENCOUNTER — TELEPHONE (OUTPATIENT)
Dept: PODIATRY | Facility: CLINIC | Age: 35
End: 2025-05-14
Payer: MEDICARE

## 2025-05-14 NOTE — TELEPHONE ENCOUNTER
Patient Returning Call    Reason for call:  call back regarding appt or referral for foot follow up    Information relayed to patient:  n/a    Patient has additional questions:  Yes    What are your questions/concerns:  Per Patient I've seen Dr. Hill before and Dr. Miranda. Wondering if I can have a care team call me, as I have some questions.     Who does the patient want to speak with:  RN    Is an  needed?:  No      Could we send this information to you in [facility] or would you prefer to receive a phone call?:   Patient would prefer a phone call   Okay to leave a detailed message?: Yes at Cell number on file:    Telephone Information:   Mobile [phone]

## 2025-05-15 NOTE — TELEPHONE ENCOUNTER
Called with no answer. Left a message that he can call back at [phone]. It was also mentioned that he can send any questions through a [employer] message as well, whichever works best for him.

## 2025-05-19 ENCOUNTER — OFFICE VISIT (OUTPATIENT)
Dept: FAMILY MEDICINE | Facility: CLINIC | Age: 35
End: 2025-05-19
Payer: MEDICARE

## 2025-05-19 VITALS
WEIGHT: 168.4 LBS | RESPIRATION RATE: 16 BRPM | TEMPERATURE: 97.8 F | HEART RATE: 106 BPM | OXYGEN SATURATION: 96 % | HEIGHT: 67 IN | DIASTOLIC BLOOD PRESSURE: 81 MMHG | BODY MASS INDEX: 26.43 KG/M2 | SYSTOLIC BLOOD PRESSURE: 136 MMHG

## 2025-05-19 DIAGNOSIS — F90.2 ATTENTION DEFICIT HYPERACTIVITY DISORDER (ADHD), COMBINED TYPE: Primary | ICD-10-CM

## 2025-05-19 DIAGNOSIS — M24.576 CONTRACTURE OF JOINT OF FOOT, UNSPECIFIED LATERALITY: ICD-10-CM

## 2025-05-19 DIAGNOSIS — Z99.2 STAGE 5 CHRONIC KIDNEY DISEASE ON CHRONIC DIALYSIS (H): ICD-10-CM

## 2025-05-19 DIAGNOSIS — F10.11 ALCOHOL ABUSE, IN REMISSION: ICD-10-CM

## 2025-05-19 DIAGNOSIS — N18.6 STAGE 5 CHRONIC KIDNEY DISEASE ON CHRONIC DIALYSIS (H): ICD-10-CM

## 2025-05-19 PROCEDURE — 1125F AMNT PAIN NOTED PAIN PRSNT: CPT | Performed by: FAMILY MEDICINE

## 2025-05-19 PROCEDURE — G2211 COMPLEX E/M VISIT ADD ON: HCPCS | Performed by: FAMILY MEDICINE

## 2025-05-19 PROCEDURE — 99214 OFFICE O/P EST MOD 30 MIN: CPT | Performed by: FAMILY MEDICINE

## 2025-05-19 PROCEDURE — 3079F DIAST BP 80-89 MM HG: CPT | Performed by: FAMILY MEDICINE

## 2025-05-19 PROCEDURE — 3075F SYST BP GE 130 - 139MM HG: CPT | Performed by: FAMILY MEDICINE

## 2025-05-19 RX ORDER — DEXTROAMPHETAMINE SACCHARATE, AMPHETAMINE ASPARTATE, DEXTROAMPHETAMINE SULFATE AND AMPHETAMINE SULFATE 2.5; 2.5; 2.5; 2.5 MG/1; MG/1; MG/1; MG/1
TABLET ORAL
Qty: 30 TABLET | Refills: 0 | Status: SHIPPED | OUTPATIENT
Start: 2025-08-11

## 2025-05-19 RX ORDER — DEXTROAMPHETAMINE SACCHARATE, AMPHETAMINE ASPARTATE MONOHYDRATE, DEXTROAMPHETAMINE SULFATE AND AMPHETAMINE SULFATE 7.5; 7.5; 7.5; 7.5 MG/1; MG/1; MG/1; MG/1
30 CAPSULE, EXTENDED RELEASE ORAL 2 TIMES DAILY
Qty: 60 CAPSULE | Refills: 0 | Status: SHIPPED | OUTPATIENT
Start: 2025-08-11 | End: 2025-05-19 | Stop reason: ALTCHOICE

## 2025-05-19 RX ORDER — DEXTROAMPHETAMINE SACCHARATE, AMPHETAMINE ASPARTATE, DEXTROAMPHETAMINE SULFATE AND AMPHETAMINE SULFATE 7.5; 7.5; 7.5; 7.5 MG/1; MG/1; MG/1; MG/1
30 TABLET ORAL 2 TIMES DAILY
Qty: 60 TABLET | Refills: 0 | Status: SHIPPED | OUTPATIENT
Start: 2025-07-11 | End: 2025-08-10

## 2025-05-19 RX ORDER — DEXTROAMPHETAMINE SACCHARATE, AMPHETAMINE ASPARTATE MONOHYDRATE, DEXTROAMPHETAMINE SULFATE AND AMPHETAMINE SULFATE 7.5; 7.5; 7.5; 7.5 MG/1; MG/1; MG/1; MG/1
30 CAPSULE, EXTENDED RELEASE ORAL 2 TIMES DAILY
Qty: 60 CAPSULE | Refills: 0 | Status: SHIPPED | OUTPATIENT
Start: 2025-07-14 | End: 2025-05-19 | Stop reason: ALTCHOICE

## 2025-05-19 RX ORDER — DEXTROAMPHETAMINE SACCHARATE, AMPHETAMINE ASPARTATE, DEXTROAMPHETAMINE SULFATE AND AMPHETAMINE SULFATE 7.5; 7.5; 7.5; 7.5 MG/1; MG/1; MG/1; MG/1
30 TABLET ORAL 2 TIMES DAILY
Qty: 60 TABLET | Refills: 0 | Status: SHIPPED | OUTPATIENT
Start: 2025-08-11 | End: 2025-09-10

## 2025-05-19 RX ORDER — DEXTROAMPHETAMINE SACCHARATE, AMPHETAMINE ASPARTATE MONOHYDRATE, DEXTROAMPHETAMINE SULFATE AND AMPHETAMINE SULFATE 7.5; 7.5; 7.5; 7.5 MG/1; MG/1; MG/1; MG/1
30 CAPSULE, EXTENDED RELEASE ORAL 2 TIMES DAILY
Qty: 60 CAPSULE | Refills: 0 | Status: SHIPPED | OUTPATIENT
Start: 2025-09-12 | End: 2025-05-19 | Stop reason: ALTCHOICE

## 2025-05-19 RX ORDER — DEXTROAMPHETAMINE SACCHARATE, AMPHETAMINE ASPARTATE, DEXTROAMPHETAMINE SULFATE AND AMPHETAMINE SULFATE 2.5; 2.5; 2.5; 2.5 MG/1; MG/1; MG/1; MG/1
TABLET ORAL
Qty: 30 TABLET | Refills: 0 | Status: SHIPPED | OUTPATIENT
Start: 2025-06-13

## 2025-05-19 RX ORDER — DEXTROAMPHETAMINE SACCHARATE, AMPHETAMINE ASPARTATE, DEXTROAMPHETAMINE SULFATE AND AMPHETAMINE SULFATE 7.5; 7.5; 7.5; 7.5 MG/1; MG/1; MG/1; MG/1
30 TABLET ORAL 2 TIMES DAILY
Qty: 60 TABLET | Refills: 0 | Status: SHIPPED | OUTPATIENT
Start: 2025-09-12 | End: 2025-10-12

## 2025-05-19 RX ORDER — DEXTROAMPHETAMINE SACCHARATE, AMPHETAMINE ASPARTATE, DEXTROAMPHETAMINE SULFATE AND AMPHETAMINE SULFATE 7.5; 7.5; 7.5; 7.5 MG/1; MG/1; MG/1; MG/1
TABLET ORAL
Qty: 60 TABLET | Refills: 0 | Status: SHIPPED | OUTPATIENT
Start: 2025-06-13

## 2025-05-19 RX ORDER — DEXTROAMPHETAMINE SACCHARATE, AMPHETAMINE ASPARTATE, DEXTROAMPHETAMINE SULFATE AND AMPHETAMINE SULFATE 2.5; 2.5; 2.5; 2.5 MG/1; MG/1; MG/1; MG/1
TABLET ORAL
Qty: 30 TABLET | Refills: 0 | Status: SHIPPED | OUTPATIENT
Start: 2025-07-14

## 2025-05-19 ASSESSMENT — PAIN SCALES - GENERAL: PAINLEVEL_OUTOF10: SEVERE PAIN (8)

## 2025-05-19 NOTE — TELEPHONE ENCOUNTER
Called and talked to the patient. The patient mentioned that her had questions for Dr. Hill, but he did not have his notes in front of him at the moment. Scheduled him for 6/2 at 2pm.

## 2025-05-19 NOTE — PROGRESS NOTES
Assessment & Plan     Attention deficit hyperactivity disorder (ADHD), combined type  Continue doing well with his medicines  Continue the need for both AM and PM dose.  - amphetamine-dextroamphetamine (ADDERALL) 10 MG tablet; Take one tab at noon  - amphetamine-dextroamphetamine (ADDERALL) 30 MG tablet; One tab bid  ( dose adjusted )  - amphetamine-dextroamphetamine (ADDERALL XR) 30 MG 24 hr capsule; Take 1 capsule (30 mg) by mouth 2 times daily.  - amphetamine-dextroamphetamine (ADDERALL XR) 30 MG 24 hr capsule; Take 1 capsule (30 mg) by mouth 2 times daily.  - amphetamine-dextroamphetamine (ADDERALL XR) 30 MG 24 hr capsule; Take 1 capsule (30 mg) by mouth 2 times daily.  - amphetamine-dextroamphetamine (ADDERALL) 10 MG tablet; Take one tab at noon  - amphetamine-dextroamphetamine (ADDERALL) 10 MG tablet; Take one tab at noon    Alcohol abuse, in remission  In full remission for years.    Stage 5 chronic kidney disease on chronic dialysis (H)  On HD, follow up with Nephrology.    Contracture of joint of foot, unspecified laterality  Follow up with Podiatry.  He report was fitted with feet and ankle braces.      The longitudinal plan of care for the diagnosis(es)/condition(s) as documented were addressed during this visit. Due to the added complexity in care, I will continue to support Dax in the subsequent management and with ongoing continuity of care.     [place] Prescription Monitoring Program, ( ) referenced. No indication of misuse, or abuse.      Follow-up    Follow-up Visit   Expected date:  Sep 19, 2025 (Approximate)      Follow Up Appointment Details:     Follow-up with whom?: PCP    Follow-Up for what?: Acute Issue Recheck    Additional Details: ADHD follow    How?: In Person or Virtual                 Subjective   Dax is a 34 year old, presenting for the following health issues:  Derm Problem (Lump under armpit ) and RECHECK    Patient doing well with his current medication, he continued the  "need for up to 30 mg twice daily, in addition, an afternoon dose.  No side effects.    End-stage kidney disease on hemodialysis, continue to follow-up with nephrology.    Continue to follow-up with podiatry regarding his feet.    Follow up with pain clinic, [facility] to chronic pain.        5/19/2025     1:55 PM   Additional Questions   Roomed by Emerald BOLANOS ma     History of Present Illness       Reason for visit:  Correct care plan inaccurate treatments, update medication list, as well as inform with the intent to be advised about transplant options.    He eats 0-1 servings of fruits and vegetables daily.He consumes 3 sweetened beverage(s) daily.He exercises with enough effort to increase his heart rate 30 to 60 minutes per day.  He exercises with enough effort to increase his heart rate 7 days per week.   He is taking medications regularly.            Review of Systems  Constitutional, HEENT, cardiovascular, pulmonary, GI, , musculoskeletal, neuro, skin, endocrine and psych systems are negative, except as otherwise noted.      Objective    /81 (BP Location: Right arm, Patient Position: Sitting, Cuff Size: Adult Large)   Pulse 106   Temp 97.8  F (36.6  C) (Oral)   Resp 16   Ht 1.702 m (5' 7\")   Wt 76.4 kg (168 lb 6.4 oz)   SpO2 96%   BMI 26.38 kg/m    Body mass index is 26.38 kg/m .  Physical Exam   GENERAL: alert and no distress  PSYCH: mentation appears normal, affect normal/bright    No orders of the defined types were placed in this encounter.           Signed Electronically by: Kole Gomez MD    "

## 2025-05-22 ENCOUNTER — TELEPHONE (OUTPATIENT)
Dept: FAMILY MEDICINE | Facility: CLINIC | Age: 35
End: 2025-05-22

## 2025-05-22 NOTE — TELEPHONE ENCOUNTER
This is something,unable to do.  I'm not a provider at the hospital  I would recommend for him to follow up the recommendation sof the staffs at the hospital.    Thanks

## 2025-05-22 NOTE — TELEPHONE ENCOUNTER
"Patient called stating he was brought into [facility] last evening for dialysis. He is now on a 72 hour hold and requesting Dr. Gomez release him from the hold and discharge him from the hospital. He states that the use of power in the hospital is being \"manipulated\". He states he is \"getting ready to change all providers\" because of this incident. Writer noted to patient that Dr. Gomez cannot release patient from a 72 hour hold. He is under the care of hospital providers at this time. He then states \"If he wants me as a patient I would like to get out as soon as possible, I am sure there is a way\". He is requesting \"discharge paperwork to be faxed to [facility]\". He hung up on writer after getting upset.    DERRICK SeguraN RN  [facility]  "

## 2025-06-03 ENCOUNTER — OFFICE VISIT (OUTPATIENT)
Dept: PODIATRY | Facility: CLINIC | Age: 35
End: 2025-06-03
Payer: MEDICARE

## 2025-06-03 DIAGNOSIS — L84 TYLOMA: ICD-10-CM

## 2025-06-03 DIAGNOSIS — Q66.70 CAVUS DEFORMITY OF FOOT: Primary | ICD-10-CM

## 2025-06-03 DIAGNOSIS — M20.41 HAMMERTOES OF BOTH FEET: ICD-10-CM

## 2025-06-03 DIAGNOSIS — M20.42 HAMMERTOES OF BOTH FEET: ICD-10-CM

## 2025-06-03 RX ORDER — LOSARTAN POTASSIUM 25 MG/1
12.5 TABLET ORAL DAILY
COMMUNITY
Start: 2025-05-31

## 2025-06-03 RX ORDER — MINERAL OIL/HYDROPHIL PETROLAT
OINTMENT (GRAM) TOPICAL DAILY
Qty: 198 G | Refills: 3 | Status: SHIPPED | OUTPATIENT
Start: 2025-06-03

## 2025-06-03 NOTE — NURSING NOTE
Dax Villareal's chief complaint for this visit includes:  Chief Complaint   Patient presents with    Consult     Bilateral foot recheck     PCP: Kole Gomez    Referring Provider:  Referred Self, MD  No address on file    There were no vitals taken for this visit.  Data Unavailable     Do you need any medication refills at today's visit? NO    No Known Allergies    Tegan Bueno LPN

## 2025-06-03 NOTE — LETTER
6/3/2025      Dax Villareal  [address]  [place] [address]      Dear Colleague,    Thank you for referring your patient, Dax Villareal, to the [facility]. Please see a copy of my visit note below.    Past Medical History:   Diagnosis Date     ADHD 2008     Alcohol abuse, in remission      Alcoholic cirrhosis of liver with ascites (H)      Anemia in chronic kidney disease      Chronic pain      Current smoker      End stage renal disease (H)      Hypotension, unspecified hypotension type      Malnutrition      Metabolic acidosis      Necrotizing pancreatitis      Pancreatic insufficiency      Pericarditis      Recurrent pleural effusion      Sensory polyneuropathy      Patient Active Problem List   Diagnosis     Necrotizing pancreatitis     ESRD (end stage renal disease) on dialysis (H)     Cirrhosis of liver (H)     Muscle weakness     Contracture of joint of foot, unspecified laterality     DIMITRI (generalized anxiety disorder)     Alcohol abuse, in remission     Chronic pain     Pancreatic insufficiency     Anemia in chronic kidney disease     Acquired bilateral pes cavus     ADHD     Lumbar radiculopathy     Current moderate episode of major depressive disorder (H)     Sensory polyneuropathy     Past Surgical History:   Procedure Laterality Date     AV FISTULA PLACEMENT, BRACHIOCEPHALIC Left 05/11/2022    [facility]     COMBINED ESOPHAGOSCOPY, GASTROSCOPY, DUODENOSCOPY (EGD), TISSUE APPOSI N/A 09/20/2021    Procedure: ESOPHAGOGASTRODUODENOSCOPY WITH SUTURE FISTULA CLOSURE, argon plasma coagulation;  Surgeon: Jose Quigley MD;  Location: [facility] OR     ENDOSCOPIC INSERTION TUBE GASTROSTOMY N/A 07/13/2021    Procedure: Upper endoscopy, INSERTION, PEG-J TUBE and Gastropexy;  Surgeon: Rayshawn Will MD;  Location: [facility] OR     ENDOSCOPIC INSERTION TUBE GASTROSTOMY  09/13/2021    Procedure: PEG/J PLACEMENT.;  Surgeon: Rayshawn Will MD;  Location: [facility]      ENDOSCOPIC ULTRASOUND UPPER GASTROINTESTINAL TRACT (GI) N/A 07/21/2021    Procedure: ENDOSCOPIC ULTRASOUND, ESOPHAGOSCOPY / UPPER GASTROINTESTINAL TRACT (GI) with cyst gastrostomy, dilation;  Surgeon: Guru Yecenia Chacko MD;  Location: [facility]     ENDOSCOPIC ULTRASOUND, ESOPHAGOSCOPY, GASTROSCOPY, DUODENOSCOPY (EGD), NECROSECTOMY N/A 08/11/2021    Procedure: ESOPHAGOGASTRODUODENOSCOPY (EGD) with necrosectomy, stent exchange.;  Surgeon: Amadou Beasley MD;  Location: [facility]     ESOPHAGOSCOPY, GASTROSCOPY, DUODENOSCOPY (EGD), COMBINED N/A 07/28/2021    Procedure: ESOPHAGOGASTRODUODENOSCOPY (EGD), gastrostomy apposition, Necrosectomy, stent exchange.;  Surgeon: Rayshawn Will MD;  [facility]     ESOPHAGOSCOPY, GASTROSCOPY, DUODENOSCOPY (EGD), COMBINED N/A 08/06/2021    Procedure: ESOPHAGOGASTRODUODENOSCOPY (EGD) with necrosectomy, and stents exchanged;  Surgeon: Jose Quigley MD;  [facility]     ESOPHAGOSCOPY, GASTROSCOPY, DUODENOSCOPY (EGD), COMBINED Left 09/13/2021    Procedure: ESOPHAGOGASTRODUODENOSCOPY (EGD), GASTROSTOMY TUBE REMOVAL, FISTULAR CLOSURE ENDOSCOPIC.;  Surgeon: Rayshawn Will MD;  [facility]     IR ABSCESS TUBE CHANGE  07/14/2021     IR NG TUBE PLACEMENT REQ RAD & FLUORO  10/26/2021     IR PARACENTESIS  07/21/2021     IR SINOGRAM INJECTION DIAGNOSTIC  08/25/2021     IR THORACENTESIS  02/05/2022     ORTHOPEDIC SURGERY Right     fracture repair     REPLACE GASTROSTOMY TUBE, PERCUTANEOUS N/A 07/28/2021    Procedure: gastro-jejunostomy tube exchange;  Surgeon: Rayshawn Will MD;  Location: [facility] OR     Social History     Socioeconomic History     Marital status: Single     Spouse name: Not on file     Number of children: Not on file     Years of education: Not on file     Highest education level: Not on file   Occupational History     Not on file   Tobacco Use     Smoking status: Every Day     Current packs/day: 0.25     Types: Cigarettes      "Passive exposure: Current     Smokeless tobacco: Never     Tobacco comments:     10/22/24 more or less around half a pack of cigs/day.          8/24/2021 Patient did not want to discuss quitting but would like 4 mg lozenge to use during admission. Patient also accepted \"Quitting for Good\" workbook   Vaping Use     Vaping status: Never Used     Passive vaping exposure: Yes   Substance and Sexual Activity     Alcohol use: Not Currently     Comment: \"no alcohol for over 1 year ago\" per pt on 5/12/22     Drug use: No     Sexual activity: Not Currently     Partners: Female   Other Topics Concern     Not on file   Social History Narrative     Not on file     Social Drivers of Health     Financial Resource Strain: Low Risk  (5/22/2025)    Received from [facility]    Financial Resource Strain      Difficulty of Paying Living Expenses: 3      Difficulty of Paying Living Expenses: Not on file   Food Insecurity: No Food Insecurity (5/22/2025)    Received from [facility]    Food Insecurity      Do you worry your food will run out before you are able to buy more?: 1   Recent Concern: Food Insecurity - High Risk (3/18/2025)    Food Insecurity      Within the past 12 months, did you worry that your food would run out before you got money to buy more?: Yes      Within the past 12 months, did the food you bought just not last and you didn t have money to get more?: No   Transportation Needs: Unmet Transportation Needs (5/22/2025)    Received from [facility]    Transportation Needs      Does lack of transportation keep you from medical appointments?: 2      Does lack of transportation keep you from work, meetings or getting things that you need?: 2   Physical Activity: Unknown (3/18/2025)    Exercise Vital Sign      Days of Exercise per Week: 0 days      Minutes of Exercise per Session: Not on file   Stress: Stress Concern Present " (3/18/2025)    [employer] - Occupational Stress Questionnaire      Feeling of Stress : Rather much   Social Connections: Socially Integrated (5/22/2025)    Received from [facility]    Social Connections      Do you often feel lonely or isolated from those around you?: 0   Interpersonal Safety: Low Risk  (4/8/2025)    Interpersonal Safety      Do you feel physically and emotionally safe where you currently live?: Yes      Within the past 12 months, have you been hit, slapped, kicked or otherwise physically hurt by someone?: No      Within the past 12 months, have you been humiliated or emotionally abused in other ways by your partner or ex-partner?: No   Housing Stability: Low Risk  (5/22/2025)    Received from [facility]    Housing Stability      What is your housing situation today?: 1   Recent Concern: Housing Stability - High Risk (3/18/2025)    Housing Stability      Do you have housing? : Yes      Are you worried about losing your housing?: Yes     Family History   Problem Relation Age of Onset     Attention Deficit Disorder Mother      Alcoholism Mother      Cancer Mother      Alcoholism Father      Alcoholism Sister      Alcoholism Brother          Subjective hraayipy-90-peqm-old returns clinic for his feet.  Relates he was seen by his surgeon and they moved out of state and they have given him a prescription for an AFO brace which he can  later this week but it is at [facility] and he would like to switch everything over to  [facility].  Relates his feet feel like he is walking on pads and like there is no cushion or the ball the foot.  Relates he feels they are doing some better and he just finished needing dialysis.  Relates he is doing some physical therapy that helped but he would like to do more intensive physical therapy and do pool therapy.    Objective findings- DP and PT are 2 out of 4 bilaterally.  Has  dorsally contracted digits 1 through 5 bilaterally.  Has decreased ankle joint dorsiflexion bilaterally.  Has mild hyperkeratotic tissue buildup plantar 2 through 4 MPJ on the right and fifth MPJ on the left.  Has anterior displaced plantar fat pad with prominent MPJs.  There is no erythema, no edema, no drainage, no odor, no calor, no tendon bilaterally.  Previous foot x-rays and CT reviewed.    Assessment and plan- Cavus foot with Hammertoes bilaterally with anterior displaced thin plantar fat pad and callus.  He has a history of peripheral sensory neuropathy.  Diagnosis and treatment options discussed with patient.  Advised him on stretching.  Prescription for physical therapy with pool therapy given use discussed with him.  I discussed with him if his AFO brace is already made he should get those and we can see if we can switch any care for adjustments and fitting for those to our orthotics and prosthetics department.  Prescription for Aquaphor given and use discussed with them.  Referral to orthopedic surgery given for any surgical options and use discussed with them.  Previous notes reviewed.  Return to clinic and see me in 2 to 3 months.                                      Moderate level of medical decision making.    Again, thank you for allowing me to participate in the care of your patient.        Sincerely,        Dev Hill DPM    Electronically signed

## 2025-06-03 NOTE — PROGRESS NOTES
Past Medical History:   Diagnosis Date    ADHD 2008    Alcohol abuse, in remission     Alcoholic cirrhosis of liver with ascites (H)     Anemia in chronic kidney disease     Chronic pain     Current smoker     End stage renal disease (H)     Hypotension, unspecified hypotension type     Malnutrition     Metabolic acidosis     Necrotizing pancreatitis     Pancreatic insufficiency     Pericarditis     Recurrent pleural effusion     Sensory polyneuropathy      Patient Active Problem List   Diagnosis    Necrotizing pancreatitis    ESRD (end stage renal disease) on dialysis (H)    Cirrhosis of liver (H)    Muscle weakness    Contracture of joint of foot, unspecified laterality    DIMITRI (generalized anxiety disorder)    Alcohol abuse, in remission    Chronic pain    Pancreatic insufficiency    Anemia in chronic kidney disease    Acquired bilateral pes cavus    ADHD    Lumbar radiculopathy    Current moderate episode of major depressive disorder (H)    Sensory polyneuropathy     Past Surgical History:   Procedure Laterality Date    AV FISTULA PLACEMENT, BRACHIOCEPHALIC Left 05/11/2022    [facility]    COMBINED ESOPHAGOSCOPY, GASTROSCOPY, DUODENOSCOPY (EGD), TISSUE APPOSI N/A 09/20/2021    Procedure: ESOPHAGOGASTRODUODENOSCOPY WITH SUTURE FISTULA CLOSURE, argon plasma coagulation;  Surgeon: Jose Quigley MD;  Location: [facility]    ENDOSCOPIC INSERTION TUBE GASTROSTOMY N/A 07/13/2021    Procedure: Upper endoscopy, INSERTION, PEG-J TUBE and Gastropexy;  Surgeon: Rayshawn Will MD;  Location: [facility]    ENDOSCOPIC INSERTION TUBE GASTROSTOMY  09/13/2021    Procedure: PEG/J PLACEMENT.;  Surgeon: Rayshawn Will MD;  Location: [facility]    ENDOSCOPIC ULTRASOUND UPPER GASTROINTESTINAL TRACT (GI) N/A 07/21/2021    Procedure: ENDOSCOPIC ULTRASOUND, ESOPHAGOSCOPY / UPPER GASTROINTESTINAL TRACT (GI) with cyst gastrostomy, dilation;  Surgeon: Guru Yecenia Chacko MD;  Location: [facility]    ENDOSCOPIC ULTRASOUND,  "ESOPHAGOSCOPY, GASTROSCOPY, DUODENOSCOPY (EGD), NECROSECTOMY N/A 08/11/2021    Procedure: ESOPHAGOGASTRODUODENOSCOPY (EGD) with necrosectomy, stent exchange.;  Surgeon: Amadou Beasley MD;  Location: [facility]    ESOPHAGOSCOPY, GASTROSCOPY, DUODENOSCOPY (EGD), COMBINED N/A 07/28/2021    Procedure: ESOPHAGOGASTRODUODENOSCOPY (EGD), gastrostomy apposition, Necrosectomy, stent exchange.;  Surgeon: Rayshawn Will MD;  [facility]: [facility]    ESOPHAGOSCOPY, GASTROSCOPY, DUODENOSCOPY (EGD), COMBINED N/A 08/06/2021    Procedure: ESOPHAGOGASTRODUODENOSCOPY (EGD) with necrosectomy, and stents exchanged;  Surgeon: Jose Quigley MD;  Location: [facility]    ESOPHAGOSCOPY, GASTROSCOPY, DUODENOSCOPY (EGD), COMBINED Left 09/13/2021    Procedure: ESOPHAGOGASTRODUODENOSCOPY (EGD), GASTROSTOMY TUBE REMOVAL, FISTULAR CLOSURE ENDOSCOPIC.;  Surgeon: Rayshawn Will MD;  Location: [facility]    IR ABSCESS TUBE CHANGE  07/14/2021    IR NG TUBE PLACEMENT REQ RAD & FLUORO  10/26/2021    IR PARACENTESIS  07/21/2021    IR SINOGRAM INJECTION DIAGNOSTIC  08/25/2021    IR THORACENTESIS  02/05/2022    ORTHOPEDIC SURGERY Right     fracture repair    REPLACE GASTROSTOMY TUBE, PERCUTANEOUS N/A 07/28/2021    Procedure: gastro-jejunostomy tube exchange;  Surgeon: Rayshawn Will MD;  Location: [facility]     Social History     Socioeconomic History    Marital status: Single     Spouse name: Not on file    Number of children: Not on file    Years of education: Not on file    Highest education level: Not on file   Occupational History    Not on file   Tobacco Use    Smoking status: Every Day     Current packs/day: 0.25     Types: Cigarettes     Passive exposure: Current    Smokeless tobacco: Never    Tobacco comments:     10/22/24 more or less around half a pack of cigs/day.          8/24/2021 Patient did not want to discuss quitting but would like 4 mg lozenge to use during admission. Patient also accepted \"Quitting for Good\" workbook   Vaping " "Use    Vaping status: Never Used    Passive vaping exposure: Yes   Substance and Sexual Activity    Alcohol use: Not Currently     Comment: \"no alcohol for over 1 year ago\" per pt on 5/12/22    Drug use: No    Sexual activity: Not Currently     Partners: Female   Other Topics Concern    Not on file   Social History Narrative    Not on file     Social Drivers of Health     Financial Resource Strain: Low Risk  (5/22/2025)    Received from [facility]    Financial Resource Strain     Difficulty of Paying Living Expenses: 3     Difficulty of Paying Living Expenses: Not on file   Food Insecurity: No Food Insecurity (5/22/2025)    [facility]    Food Insecurity     Do you worry your food will run out before you are able to buy more?: 1   Recent Concern: Food Insecurity - High Risk (3/18/2025)    Food Insecurity     Within the past 12 months, did you worry that your food would run out before you got money to buy more?: Yes     Within the past 12 months, did the food you bought just not last and you didn t have money to get more?: No   Transportation Needs: Unmet Transportation Needs (5/22/2025)    Received from [facility]    Transportation Needs     Does lack of transportation keep you from medical appointments?: 2     Does lack of transportation keep you from work, meetings or getting things that you need?: 2   Physical Activity: Unknown (3/18/2025)    Exercise Vital Sign     Days of Exercise per Week: 0 days     Minutes of Exercise per Session: Not on file   Stress: Stress Concern Present (3/18/2025)    [employer] - Occupational Stress Questionnaire     Feeling of Stress : Rather much   Social Connections: Socially Integrated (5/22/2025)    Received from [facility]    Social Connections     Do you often feel lonely or isolated from those around " you?: 0   Interpersonal Safety: Low Risk  (4/8/2025)    Interpersonal Safety     Do you feel physically and emotionally safe where you currently live?: Yes     Within the past 12 months, have you been hit, slapped, kicked or otherwise physically hurt by someone?: No     Within the past 12 months, have you been humiliated or emotionally abused in other ways by your partner or ex-partner?: No   Housing Stability: Low Risk  (5/22/2025)    Received from [facility]    Housing Stability     What is your housing situation today?: 1   Recent Concern: Housing Stability - High Risk (3/18/2025)    Housing Stability     Do you have housing? : Yes     Are you worried about losing your housing?: Yes     Family History   Problem Relation Age of Onset    Attention Deficit Disorder Mother     Alcoholism Mother     Cancer Mother     Alcoholism Father     Alcoholism Sister     Alcoholism Brother          Subjective aptpduni-[id number]-[id number]-old returns clinic for his feet.  Relates he was seen by his surgeon and they moved out of state and they have given him a prescription for an AFO brace which he can  later this week but it is at [facility] and he would like to switch everything over to [employer].  Relates his feet feel like he is walking on pads and like there is no cushion or the ball the foot.  Relates he feels they are doing some better and he just finished needing dialysis.  Relates he is doing some physical therapy that helped but he would like to do more intensive physical therapy and do pool therapy.    Objective findings- DP and PT are 2 out of 4 bilaterally.  Has dorsally contracted digits 1 through 5 bilaterally.  Has decreased ankle joint dorsiflexion bilaterally.  Has mild hyperkeratotic tissue buildup plantar 2 through 4 MPJ on the right and fifth MPJ on the left.  Has anterior displaced plantar fat pad with prominent MPJs.  There is no erythema, no edema, no drainage, no odor, no calor, no  tendon bilaterally.  Previous foot x-rays and CT reviewed.    Assessment and plan- Cavus foot with Hammertoes bilaterally with anterior displaced thin plantar fat pad and callus.  He has a history of peripheral sensory neuropathy.  Diagnosis and treatment options discussed with patient.  Advised him on stretching.  Prescription for physical therapy with pool therapy given use discussed with him.  I discussed with him if his AFO brace is already made he should get those and we can see if we can switch any care for adjustments and fitting for those to our orthotics and prosthetics department.  Prescription for Aquaphor given and use discussed with them.  Referral to orthopedic surgery given for any surgical options and use discussed with them.  Previous notes reviewed.  Return to clinic and see me in 2 to 3 months.                                      Moderate level of medical decision making.

## 2025-06-04 ENCOUNTER — PATIENT OUTREACH (OUTPATIENT)
Dept: CARE COORDINATION | Facility: CLINIC | Age: 35
End: 2025-06-04
Payer: MEDICARE

## 2025-06-05 ENCOUNTER — THERAPY VISIT (OUTPATIENT)
Dept: PHYSICAL THERAPY | Facility: CLINIC | Age: 35
End: 2025-06-05
Payer: MEDICARE

## 2025-06-05 DIAGNOSIS — M21.6X2 ACQUIRED BILATERAL PES CAVUS: Primary | ICD-10-CM

## 2025-06-05 DIAGNOSIS — G89.29 CHRONIC PAIN OF BOTH FEET: ICD-10-CM

## 2025-06-05 DIAGNOSIS — M24.576 CONTRACTURE OF JOINT OF FOOT, UNSPECIFIED LATERALITY: ICD-10-CM

## 2025-06-05 DIAGNOSIS — M79.672 CHRONIC PAIN OF BOTH FEET: ICD-10-CM

## 2025-06-05 DIAGNOSIS — M79.671 CHRONIC PAIN OF BOTH FEET: ICD-10-CM

## 2025-06-05 DIAGNOSIS — M21.6X1 ACQUIRED BILATERAL PES CAVUS: Primary | ICD-10-CM

## 2025-06-05 NOTE — PROGRESS NOTES
See flow sheet    [facility]                                                                                   OUTPATIENT PHYSICAL THERAPY    PLAN OF TREATMENT FOR OUTPATIENT REHABILITATION   Patient's Last Name, First Name, Dax Salguero YOB: 1990   Provider's Name   [facility]   Medical Record No.  [id number]     Onset Date: 03/18/25 (MD order)  Start of Care Date: 04/08/25     Medical Diagnosis:  Acquired bilateral pes cavus  Contracture of joint of foot, unspecified laterality      PT Treatment Diagnosis:  B foot pain Plan of Treatment  Frequency/Duration: 1x/week/ 2 months    Certification date from (P) 06/05/25 to (P) 08/05/25         See note for plan of treatment details and functional goals     Lavinia Soto, PT                         I CERTIFY THE NEED FOR THESE SERVICES FURNISHED UNDER        THIS PLAN OF TREATMENT AND WHILE UNDER MY CARE     (Physician attestation of this document indicates review and certification of the therapy plan).              Referring Provider:  Kole Gomez    Initial Assessment  See Epic Evaluation- Start of Care Date: 04/08/25

## 2025-06-12 ENCOUNTER — TELEPHONE (OUTPATIENT)
Dept: DERMATOLOGY | Facility: CLINIC | Age: 35
End: 2025-06-12

## 2025-06-19 ENCOUNTER — TELEPHONE (OUTPATIENT)
Dept: FAMILY MEDICINE | Facility: CLINIC | Age: 35
End: 2025-06-19

## 2025-06-19 NOTE — TELEPHONE ENCOUNTER
"Patient called in. He states he is currently at [facility]. Patient states he went on vacation and when he returned he was arrested and put on a 72 hour hold \"due to missing dialysis and being a threat\" to himself. Patient states he was told by his kidney doctor that he no longer needs dialysis, so he states he is being unjustly held in this hospital. He said he is still under a hold and that there is a petition in progress to pursue civil commitment. Patient is requesting Dr. Gomez to request he be discharged or at least transferred to a [facility] unit.     Patient also states that they are keeping his cell phone away from him which he is very upset about (he is calling from an [facility] phone at this time). Per hospital notes \"6/19/2025: No additional medication changes today. We will continue at current dosing of Adderall XR. He did utilize his phone to get some of his bills paid.\"     I advised him to talk to his care team at [facility] about his concerns and patient states he does not trust them and only trusts Dr. Gomez. I informed him that Dr. Gomez will not have any control over his hospital care so he needs to talk to his [facility] care team. Patient is insisting that message be sent to Dr. Gomez. He then said he had to get off the call to attend a group session in the hospital, so he ended the call. Please advise.    Oumou POE RN  [facility]   "

## 2025-06-19 NOTE — TELEPHONE ENCOUNTER
It is up to his medical , treating team at hospital to decide his care.    I have no control of that.    Thanks

## 2025-06-20 NOTE — TELEPHONE ENCOUNTER
"Attempted to call patient, but phone immediately went to: \"the person you have dialed is unable to reach calls at this time.\" And phone hung up.    DAMASO Arenas  [place] Triage RN  [facility]    "

## 2025-06-30 ENCOUNTER — VIRTUAL VISIT (OUTPATIENT)
Dept: FAMILY MEDICINE | Facility: CLINIC | Age: 35
End: 2025-06-30
Payer: MEDICARE

## 2025-06-30 ENCOUNTER — TELEPHONE (OUTPATIENT)
Dept: FAMILY MEDICINE | Facility: CLINIC | Age: 35
End: 2025-06-30

## 2025-06-30 DIAGNOSIS — Q66.71 PES CAVUS OF BOTH FEET: ICD-10-CM

## 2025-06-30 DIAGNOSIS — F90.9 ATTENTION DEFICIT HYPERACTIVITY DISORDER (ADHD), UNSPECIFIED ADHD TYPE: ICD-10-CM

## 2025-06-30 DIAGNOSIS — F31.5 BIPOLAR AFFECTIVE DISORDER, DEPRESSED, SEVERE, WITH PSYCHOTIC BEHAVIOR (H): ICD-10-CM

## 2025-06-30 DIAGNOSIS — N18.32 STAGE 3B CHRONIC KIDNEY DISEASE (H): ICD-10-CM

## 2025-06-30 DIAGNOSIS — Z09 HOSPITAL DISCHARGE FOLLOW-UP: Primary | ICD-10-CM

## 2025-06-30 DIAGNOSIS — Q66.72 PES CAVUS OF BOTH FEET: ICD-10-CM

## 2025-06-30 PROCEDURE — 98004 SYNCH AUDIO-VIDEO EST SF 10: CPT | Performed by: NURSE PRACTITIONER

## 2025-06-30 PROCEDURE — 1111F DSCHRG MED/CURRENT MED MERGE: CPT | Mod: 95 | Performed by: NURSE PRACTITIONER

## 2025-06-30 NOTE — TELEPHONE ENCOUNTER
Was on 2 30's a day  Was in the hospital. They wanted to change medication list. Put pt on Adderall 10mg time release. Didn't like it.    Neph: Cyclobenzeprine 1-2 tablets 10mg TID. Working well    Missed some PT d/t 72 hour holds.    Finding it difficult to follow through with out Adderall    Has adderall on file, when was last refill?  Doesn't like the 10mg time released    Scheduled hospital follow up today at 1600.    Adderall 10's: Last filled over 3 months ago. 6/10 has refill available  Adderall 30's: Last filled on 5/14/2025. Does have a prescription dated 6/13 that is available.    Berenice Whittaker RN on 6/30/2025 at 2:56 PM

## 2025-06-30 NOTE — PROGRESS NOTES
"Dax is a 34 year old who is being evaluated via a billable video visit.    How would you like to obtain your AVS? MyChart  If the video visit is dropped, the invitation should be resent by: Text to cell phone: [phone]  Will anyone else be joining your video visit? No      Assessment & Plan     Hospital discharge follow-up  Appears stable, but he states he is only taking the flexeril. Has not picked up other medications. Multiple times asking about Adderall and refilling it at 10mg. Per hospital notes, Adderall or any stimulant is not advised.     Bipolar affective disorder, depressed, severe, with psychotic behavior (H)  He has not taken Zyprexa since being discharged from the hospital 3 days ago. He was contacted this morning by Janes and states they did an intake. I advised he restart the Zyprexa at night and follow-up with [facility]es as directed.     Stage 3b chronic kidney disease (H)  He states he was cleared to not continue dialysis. He has a fistula for access.     Attention deficit hyperactivity disorder (ADHD), unspecified ADHD type  Hospital discharge notes recommend against restarting any stimulant including Adderall. Patient feels he needs it to stay focused and get the things he needs to get done accomplished. Advised he follow-up with mental health provider as planned to further discuss this, but at this time, not able to refill due to hospital recommendations.     Pes cavus of both feet  Follows with dermatology. Using mineral oil and cream.         MED REC REQUIRED  Post Medication Reconciliation Status:  Discharge medications reconciled, continue medications without change  BMI  Estimated body mass index is 26.38 kg/m  as calculated from the following:    Height as of 5/19/25: 1.702 m (5' 7\").    Weight as of 5/19/25: 76.4 kg (168 lb 6.4 oz).         Follow-up   No follow-ups on file.        Subjective   Dax is a 34 year old, presenting for the following health issues:  Hospital F/U        " 6/30/2025     3:41 PM   Additional Questions   Roomed by Cass   Accompanied by none         6/30/2025     3:41 PM   Patient Reported Additional Medications   Patient reports taking the following new medications none     Video Start Time: 4:13pm    [facility]        Hospital Follow-up Visit:    Hospital/Nursing Home/IP Rehab Facility: [facility]   Date of Admission: 06/06/25  Date of Discharge: 06/27/25  Reason(s) for Admission: mental health crisis, was in under a mental health hold  Do you have any other stressors you would like to discuss with your provider? OTHER:    Is not on any medication at this time, patient only on Flexeril currently, discuss medications   Discuss paperwork and discuss ADHD meds and med doses   Problems taking medications regularly:  None, unless he is unsure about the med   Medication changes since discharge: patient is not on any medication now  Problems adhering to non-medication therapy:  sometimes if he is unsure about his medication     Summary of hospitalization:  CareEverywhere information obtained and reviewed  Diagnostic Tests/Treatments reviewed.  Follow up needed: none  Other Healthcare Providers Involved in Patient s Care:         Specialist appointment - mental health referral, intake was started this morning with Janse  Update since discharge: stable.         Plan of care communicated with patient           Discharge Summaries  - documented in this encounter   Baldev Wong NP - 06/27/2025 4:37 PM CDT  Formatting of this note is different from the original.  Images from the original note were not included.  HOSPITAL DISCHARGE SUMMARY    Patient Name: Dax Villareal  YOB: 1990 Age: 34 y.o.  Medical Record Number: [id number]  Primary Physician: No Pcp  Phone: None  Admission Date: 6/6/2025  Discharge Date: 6/27/2025    He will be discharged from [facility] to home.    ADMISSION DIAGNOSIS:  Psychotic disorder (HC)  Previous history  "of ADHD, MDD, PTSD and Polysubstance Use Disorder  Chronic pain  ESRD    PRINCIPAL DISCHARGE DIAGNOSIS: Bipolar affective disorder, depressed, severe, with psychotic behavior (HC)    Principal Problem:  Bipolar affective disorder, depressed, severe, with psychotic behavior (HC)  Active Problems:  Cirrhosis, alcoholic (HC)  Chronic pain  Hyperlipidemia  Stage 3b chronic kidney disease (HC)  Pes cavus of both feet  HTN (hypertension)    has no past medical history on file.    Diagnoses impacting complexity: none to add    BRIEF HISTORY OF PRESENT ILLNESS:  Patient is a 34 y.o. year old single White male. Dax Villareal was admitted for acute psychosis from [facility] emergency department. Dax Villareal presented to the emergency department by way of EMS and was admitted on a 72 hour hold. The patient carries a diagnosis of Unspecified psychosis/Delusional Disorder/SCAD, MDD, ADHD, Polysubstance Use Disorder, and PTSD.    CHIEF COMPLAINT  \"I don't really want to talk. I will need non-medical transportation home tomorrow at 5:20.\" (72 hour hold expires at that time.)    HISTORY OF PRESENTING PROBLEM  Per EMS report, patient's neighbors called the police due to complaints of patient handing out fire extinguishers. Patient was then expressing threats of wanting to murder his neighbors. EMS reports that patient was calm and collected while walking to EMS for transport however en route patient became uncooperative and required restraints. Patient attempted to remove restraints and required restraint locks, EMS reports patient was generally uncooperative en route and went on tangents regarding Hinduism, gender identity. Patient refused vitals for EMS, no medications were administered. Patient endorses history of ADHD and is medicated with adderall, patient denies any other mental health medications. Patient denies homicidal ideation toward any staff at time of visit, and patient does not remember making homicidal threats. Patient has " "fistula on right arm, patient reports he is no longer receiving dialysis.    Per [facility], \"I got brought here by two sergeants who weren't listening to me. I am incriminated and I'm not able to be exonerated.\" The patient is their own guardian and arrived to ED on Health and  Hold [place] [employer]. Patient lives independently in an apartment.    Per  Hold, police were called by patients neighbor and landlord, who reported patient was tampering with the fire extinguishers in the building, reporting he had to \"hold them hostage.\" Patient also reportedly knocked on the neighbors door with a lamp and reported he was returning the lamp to her and patients neighbor reported this was not her lamp. Patient then told the neighbor that he would shoot her if she tried to go into his apartment. When officers arrived to patients apartment, patient was in the hallway standing next to the lamp and fire extinguishers reporting \"these are evidence.\" Patient was then brought to the ED via EMS for further mental health evaluation.    Patient is a 34 year old white male with a historical diagnosis of ADHD, PTSD, Polysubstance Use Disorder, Delusional disorder, Unspecified Psychosis, and depression. Patient is alert and oriented. Patient is calm and cooperative upon approach although is in restraints at time of assessment. Patient appears fairly well-groomed. Patient is a poor historian at present and is unable to answer many assessment questions, as he is disorganized in thought process. Patient is tangential and perseverative on crime, evidence, FBI, law enforcement, , etc. Patient speech is normal rate and rhythm. When asked about SI, patient reports \"no but I am financially incriminating myself.\" When asked about hallucinations, patient reports \"no but when I'm at a crime scene, I am able to see evidence that others can't see and it's always accurate.\" Patient denies HI or substance use. " "When asked if he has been eating or sleeping, patient reports he has not been doing either. Patient reports there is plastic in his food. Patient reports \"a plastic bag melted in my oven. It had my brothers urn in it and I heated up the oven and cooked a pizza and forgot the urn in the plastic bag were in there.\" Patient reports he initially put his brothers urn in a plastic bag and into the oven because the urn had a device in it that was allowing people to listen to him.    Patient reports that he is being incriminated \"by different people at different times. I am collecting evidence from my friends and neighbors.\" Patient reports he is on hold for the  and a high ranking walk-in officer. Patient reports he wants to become a firearm instructor \"to keep the title that I want.\" When asked about what he was doing with the fire extinguishers in his apartment prior to arrival, patient reports \"all 3 were in service, the glass was unbroken and it was unlocked to exonerate the evidence.\" When asked if he is taking any medications or seeing any mental health providers, patient becomes tangential about police officers across the street from [facility]'s pharmacy approving different doses of medications for him and is unable to give a reliable answer.    Per chart review, patient was hospitalized at [facility] from 5/21-5/30 and patient was petitioned for commitment with [facility] through [facility] which was not supported. Patient then requested to discharge immediately and outpatient care was not able to be set up for patient.    Behavior Chain Analysis  Target Behavior: Crisis event other than suicide Disorganized behavior  Prompting events: Unknown  Chain of events leading to current crisis behavior and effects of behavior on self and others: Patient was taking out the fire extinguishers in his apartment building and \"holding them hostage.\" Patient then tried to give a neighbor a lamp, reporting that he was giving it " "back to her. Patients neighbor reports it was not her lamp and patient then made comments about shooting her if she went into his apartment. Police were called by patients neighbor and landlord  Vulnerability factors: Recent hospitalization, treatment non-compliance    Collateral: Noni (sister) # [phone].  She did not know that he was in the hospital today, but was aware that he was discharged from here six days ago. She wanted to know if he was safe and I assured her that he is safe. She said when she talked to him, he was happy to be out of the hospital.    She said he has been struggling for a long time - struggling with acknowledging his mental health problems or his need for dialysis. She said he is grandiose, threatening to piter health care providers when he receives health care.    She said she does not know if he is using EtOH again, but he told her he was thinking of having a glass of wine because it would do the same thing for him as his medication.    BRIEF HOSPITAL COURSE: This 34 y.o. male admitted 6/6/2025 to [facility] for the assessment and treatment of the above presentation. The patient was admitted on a 72 hour hold.    On admission, when I ask what happened prior to admit that he is in the hospital, \"I'm not at liberty to say\" due to the circumstances. Tells me that he doesn't want to talk but makes sure I know he will need non-emergency transportation home tomorrow when hold expires. Advised that he is being petitioned for commitment and will need to wait to see if his petition if granted. \"They won't do that to me, I'm fine.\" Denies that he needs medications outside of Flexeril and states that he won't take them either - has been denying Zyprexa. \"I don't need that.\" Also asked about Depakote that notes indicate that ED staff recommended - \"I don't need that either.\" Denies all MH symptoms and refused to talk further stating that he was \"conserving my strength now\" and he pulled " his covers of bed up and turned away from me.  Current suicidal ideation: Denies  History of Suicide Attempts: Denies  Self Injurious Behaviors: Denies  History of Self-injurious behavior: Denies  Property destruction: Denies  Thoughts/threats to harm others: threats to neighbors prior to this ED visit  History of violence: Denies  Access to weapons: Denies  Inpatient monitoring: Suicide Risk Level:: Low  Suicidal risk monitoring: Suicide Risk Level:: Low  Medication compliance has been poor, per self report.  Side effects from admission medications: denies    CHEMICAL HEALTH HISTORY  Patient ended conversation prior to questioning related to substance. Previous hx of polysubstance abuse per EMR. UDS was non-negative for THC and amphetamine. Noted Adderall, Hydrocodone, Oxycodone, and Buprenorphine prescriptions.  DWI in 9/2017 and 6/2012.  Chemical dependency treatment History: unknown    PAST PSYCHIATRIC HISTORY  Previous psychiatric admission at [facility] from 5/21/2025 until 5/30/2025.  Previous commitment history - petitioned for commitment during previous hospitalization and was denied by [facility].  Current psychiatric provider - denies.  Current therapist - denies.  Patient denies current [facility] .  Previous medication trials include unknown.  Patient no previous ECT trials.    Over the course of the hospitalization the following changes to medications were made: started Zyprexa 20mg and consolidated at HS. Started Norvasc for hypertension. Discontinue Adderall and advised against restarting stimulants at this point. Discontinued empagliflozin and losartan. Patient does not require dialysis at this time per nephrology.    Throughout this hospitalization, Dax exhibited improvement in mood, thinking and perception. Patient was engaged to participate in therapeutic milieu of the unit including OT, TR and groups. Dax refused medications upon arrival though did start taking on own and we consolidated Zyprexa  "to HS to help sleep more. Behaviors do not show any signs of saba or psychosis upon discharge. Does endorse that he was \"looking into things too much that were going wrong.\" His sleep is much improved. States that he plans to focus on his new care plan and abide by the provisional discharge including taking medications as ordered and follow up with providers. Patient reported no acute issues overnight. Dax reported no medication related side effects. At the time of discharge, there appeared to be no evidence that the patient posed an imminent threat to self or others and a reasonable discharge plan was in place. He also has Extreme risk protection order that was granted by [facility] and working with Josué LOBO to resolve though there is also an appeal to order because of timing challenges with his commitment hearing at the same time. Overall it was determined that the patient had achieved optimal medical benefit from hospitalization, presents with no imminent danger, and was discharged with follow-up as detailed below.    LEGAL: Patient was admitted to the hospital on a emergency hold. Patient was petitioned for MI Commitment with [facility] through [facility]. The [facility] supported commitment petition. Current legal status is full commitment with [facility] after completing the court process.    START taking these medicines     Instructions   amLODIPine 10 mg tablet  For diagnoses: HTN (hypertension)  Start taking on: June 28, 2025  Commonly known as: NORVASC  Take 1 Tablet (10 mg) by mouth once daily.    clotrimazole 1 % cream  For diagnoses: Rash  Commonly known as: LOTRIMIN  Apply topically to affected area(s) two times daily.    lidocaine 4 % topical patch  For diagnoses: Lumbar radiculopathy  Apply to intact skin to cover most painful area for max 12hr per 24hr period.    OLANzapine 20 mg tablet  For diagnoses: Bipolar disorder, current episode manic severe with psychotic features (HC)  Commonly known as: " "ZYPREXA  Take 1 Tablet (20 mg) by mouth at bedtime.    white petrolatum-mineral oil-lanolin cream  For diagnoses: Dry skin  Commonly known as: EUCERIN  Apply topically to affected area(s) once daily if needed for Dry Skin.          CHANGE how you take these medicines     Instructions   cyclobenzaprine 5 mg tablet  For diagnoses: Other chronic pain  What changed: how much to take  Commonly known as: FLEXERIL  Take 1-2 Tablets (5-10 mg) by mouth 3 times daily if needed for Muscle Spasm.          CONTINUE taking these medicines     Instructions   melatonin 3 mg tablet  For diagnoses: Altered mental status, unspecified altered mental status type  Take 2 Tablets (6 mg) by mouth at bedtime.          STOP taking these medicines     AdderalL 30 mg tablet  Generic drug: amphetamine-dextroamphetamine    empagliflozin 10 mg tablet  Commonly known as: JARDIANCE    losartan 25 mg tablet  Commonly known as: COZAAR      -----------------------------  Additional provider notes: Patient presents for the following:     Hospital follow-up: He wanted to focus on not needing dialysis. He was working with [email or web address]. He did an intake with [facility] this morning for mental health. \"I feel I am under represented and under medicated\".   Current medication: flexeril  He is wanting Adderall.     Drank 2 24oz of mountain dew this morning. Then states he had to go to bed right away. \"Having to prioritize my calendar to see what priorities\".     Muscle spasms: in feet, takes flexeril. Working with PT and has seen some improvements.     No Known Allergies    Current Outpatient Medications   Medication Sig Dispense Refill    cyclobenzaprine (FLEXERIL) 5 MG tablet 1-2 tab at bedtime as needed 60 tablet 5    amphetamine-dextroamphetamine (ADDERALL) 10 MG tablet Take one tab at noon (Patient not taking: Reported on 6/30/2025) 30 tablet 0    [START ON 7/14/2025] amphetamine-dextroamphetamine (ADDERALL) 10 MG tablet Take one tab at noon (Patient not " taking: Reported on 6/30/2025) 30 tablet 0    [START ON 8/11/2025] amphetamine-dextroamphetamine (ADDERALL) 10 MG tablet Take one tab at noon (Patient not taking: Reported on 6/30/2025) 30 tablet 0    amphetamine-dextroamphetamine (ADDERALL) 30 MG tablet One tab bid  ( dose adjusted ) (Patient not taking: Reported on 6/30/2025) 60 tablet 0    [START ON 7/11/2025] amphetamine-dextroamphetamine (ADDERALL) 30 MG tablet Take 1 tablet (30 mg) by mouth 2 times daily. (Patient not taking: Reported on 6/30/2025) 60 tablet 0    [START ON 8/11/2025] amphetamine-dextroamphetamine (ADDERALL) 30 MG tablet Take 1 tablet (30 mg) by mouth 2 times daily. (Patient not taking: Reported on 6/30/2025) 60 tablet 0    [START ON 9/12/2025] amphetamine-dextroamphetamine (ADDERALL) 30 MG tablet Take 1 tablet (30 mg) by mouth 2 times daily. (Patient not taking: Reported on 6/30/2025) 60 tablet 0    amphetamine-dextroamphetamine (ADDERALL) 30 MG tablet One tab bid  ( dose adjusted ) (Patient not taking: Reported on 6/30/2025) 60 tablet 0    baclofen (LIORESAL) 10 MG tablet Take 1 tablet (10 mg) by mouth 3 times daily 90 tablet 2    buprenorphine (BUTRANS) 5 MCG/HR WK patch Place 1 patch onto the skin every 7 days.      ciclopirox (LOPROX) 0.77 % cream Apply topically 2 times daily To feet and toenails. (Patient not taking: Reported on 6/30/2025) 90 g 5    empagliflozin (JARDIANCE) 10 MG TABS tablet Take 10 mg by mouth daily. (Patient not taking: Reported on 6/30/2025)      losartan (COZAAR) 25 MG tablet Take 12.5 mg by mouth daily. (Patient not taking: Reported on 6/30/2025)      mineral oil-hydrophilic petrolatum (AQUAPHOR) external ointment Apply topically daily. To feet and calluses. (Patient not taking: Reported on 6/30/2025) 198 g 3    oxyCODONE (ROXICODONE) 5 MG tablet Take 1 tablet (5 mg) by mouth every 6 hours as needed for pain (Patient not taking: Reported on 6/30/2025) 6 tablet 0     No current facility-administered medications for  this visit.       Past Medical History:   Diagnosis Date    ADHD 2008    Alcohol abuse, in remission     Alcoholic cirrhosis of liver with ascites (H)     Anemia in chronic kidney disease     Chronic pain     Current smoker     End stage renal disease (H)     Hypotension, unspecified hypotension type     Malnutrition     Metabolic acidosis     Necrotizing pancreatitis     Pancreatic insufficiency     Pericarditis     Recurrent pleural effusion     Sensory polyneuropathy             Review of Systems  Constitutional, HEENT, cardiovascular, pulmonary, gi and gu systems are negative, except as otherwise noted.      Objective           Vitals:  No vitals were obtained today due to virtual visit.    Physical Exam  GENERAL: alert and no distress  EYES: Eyes grossly normal to inspection.  No discharge or erythema, or obvious scleral/conjunctival abnormalities.  RESP: No audible wheeze, cough, or visible cyanosis.    SKIN: Visible skin clear. No significant rash, abnormal pigmentation or lesions.  NEURO: Cranial nerves grossly intact.  Mentation and speech appropriate for age.  PSYCH: Calm, cooperative          Video-Visit Details    Type of service:  Video Visit   Video End Time:4:28 PM  Originating Location (pt. Location): Home    Distant Location (provider location):  Off-site  Platform used for Video Visit: La  Signed Electronically by: Mary Oneil DNP

## 2025-07-01 NOTE — TELEPHONE ENCOUNTER
Patient had virtual visit 6/30/25.    Closing encounter.    Samson PETIT, RN  Triage Nurse  [facility]

## 2025-07-22 ENCOUNTER — TELEPHONE (OUTPATIENT)
Dept: FAMILY MEDICINE | Facility: CLINIC | Age: 35
End: 2025-07-22
Payer: MEDICARE

## 2025-07-22 NOTE — TELEPHONE ENCOUNTER
Reason for Call:  Appointment Request    Patient requesting this type of appt: Chronic Diease Management/Medication/Follow-Up    Requested provider: Kole Gomez    Reason patient unable to be scheduled: Not within requested timeframe    When does patient want to be seen/preferred time: as soon as possible    Comments: Pt called to request an in person appt to see PCP (Dr Gomez); PCP next available in person appt not until 8/11. Pt is wanting to follow up regarding a med check, weight check, and 'kind of' a follow up appt about his hospital visit (with his PCP, he already had one with another provider). Pt also mentioned that he is requesting Dr Gomez to have a peer to peer review about readjusting previous medical regiment.     Pt states it is rather urgent as he has been experiencing some fall out as he hasn't seen PCP since his hospital visit; fall out with physical therapy, his appearance (physical appearance- has gained at least 10 lbs).    Pt would be okay with a virtual visit if it was significantly sooner.     Could we send this information to you in [employer] or would you prefer to receive a phone call?:   Patient would like to be contacted via [employer]    Phone call too as well,     Call taken on 7/22/2025 at 12:57 PM by Samaria Raymundo

## 2025-07-25 PROBLEM — Z99.2 ESRD (END STAGE RENAL DISEASE) ON DIALYSIS (H): Status: RESOLVED | Noted: 2021-11-03 | Resolved: 2025-07-25

## 2025-07-25 PROBLEM — N18.6 ESRD (END STAGE RENAL DISEASE) ON DIALYSIS (H): Status: RESOLVED | Noted: 2021-11-03 | Resolved: 2025-07-25

## 2025-08-12 ENCOUNTER — TELEPHONE (OUTPATIENT)
Dept: DERMATOLOGY | Facility: CLINIC | Age: 35
End: 2025-08-12
Payer: MEDICARE

## 2025-08-13 ENCOUNTER — VIRTUAL VISIT (OUTPATIENT)
Dept: FAMILY MEDICINE | Facility: CLINIC | Age: 35
End: 2025-08-13
Payer: MEDICARE

## 2025-08-13 DIAGNOSIS — F31.5 BIPOLAR AFFECTIVE DISORDER, DEPRESSED, SEVERE, WITH PSYCHOTIC BEHAVIOR (H): Primary | ICD-10-CM

## 2025-08-13 PROCEDURE — 98006 SYNCH AUDIO-VIDEO EST MOD 30: CPT | Performed by: FAMILY MEDICINE

## 2025-08-19 ENCOUNTER — TELEPHONE (OUTPATIENT)
Dept: FAMILY MEDICINE | Facility: CLINIC | Age: 35
End: 2025-08-19
Payer: MEDICARE

## 2025-08-20 ENCOUNTER — PATIENT OUTREACH (OUTPATIENT)
Dept: CARE COORDINATION | Facility: CLINIC | Age: 35
End: 2025-08-20
Payer: MEDICARE

## 2025-08-27 ENCOUNTER — OFFICE VISIT (OUTPATIENT)
Dept: PODIATRY | Facility: CLINIC | Age: 35
End: 2025-08-27
Payer: MEDICARE

## 2025-08-27 DIAGNOSIS — B35.1 ONYCHOMYCOSIS: ICD-10-CM

## 2025-08-27 DIAGNOSIS — L84 TYLOMA: Primary | ICD-10-CM

## 2025-08-27 DIAGNOSIS — G60.8 PERIPHERAL SENSORY NEUROPATHY: ICD-10-CM

## 2025-08-27 DIAGNOSIS — M79.671 PAIN IN BOTH FEET: ICD-10-CM

## 2025-08-27 DIAGNOSIS — M79.672 PAIN IN BOTH FEET: ICD-10-CM

## 2025-08-27 DIAGNOSIS — Q66.70 CAVUS DEFORMITY OF FOOT: ICD-10-CM

## 2025-08-27 PROBLEM — B35.3 TINEA PEDIS OF BOTH FEET: Status: ACTIVE | Noted: 2025-08-27

## 2025-08-27 PROCEDURE — 11056 PARNG/CUTG B9 HYPRKR LES 2-4: CPT | Mod: Q8 | Performed by: PODIATRIST

## 2025-08-27 PROCEDURE — 99214 OFFICE O/P EST MOD 30 MIN: CPT | Mod: 25 | Performed by: PODIATRIST

## 2025-08-27 PROCEDURE — 1125F AMNT PAIN NOTED PAIN PRSNT: CPT | Performed by: PODIATRIST

## 2025-08-27 PROCEDURE — 11720 DEBRIDE NAIL 1-5: CPT | Mod: 59 | Performed by: PODIATRIST

## 2025-08-27 RX ORDER — CICLOPIROX OLAMINE 7.7 MG/G
CREAM TOPICAL 2 TIMES DAILY
Qty: 90 G | Refills: 5 | Status: SHIPPED | OUTPATIENT
Start: 2025-08-27

## 2025-08-27 ASSESSMENT — PAIN SCALES - GENERAL: PAINLEVEL_OUTOF10: MODERATE PAIN (6)

## 2025-09-04 ENCOUNTER — OFFICE VISIT (OUTPATIENT)
Dept: FAMILY MEDICINE | Facility: CLINIC | Age: 35
End: 2025-09-04
Payer: MEDICARE

## 2025-09-04 VITALS
SYSTOLIC BLOOD PRESSURE: 133 MMHG | OXYGEN SATURATION: 97 % | RESPIRATION RATE: 24 BRPM | TEMPERATURE: 97.6 F | HEART RATE: 98 BPM | DIASTOLIC BLOOD PRESSURE: 87 MMHG | HEIGHT: 67 IN | WEIGHT: 189 LBS | BODY MASS INDEX: 29.66 KG/M2

## 2025-09-04 DIAGNOSIS — F90.2 ATTENTION DEFICIT HYPERACTIVITY DISORDER (ADHD), COMBINED TYPE: Primary | ICD-10-CM

## 2025-09-04 DIAGNOSIS — M54.16 LUMBAR RADICULOPATHY: ICD-10-CM

## 2025-09-04 DIAGNOSIS — M24.576 CONTRACTURE OF JOINT OF FOOT, UNSPECIFIED LATERALITY: ICD-10-CM

## 2025-09-04 DIAGNOSIS — Q66.72 PES CAVUS OF BOTH FEET: ICD-10-CM

## 2025-09-04 DIAGNOSIS — Q66.71 PES CAVUS OF BOTH FEET: ICD-10-CM

## 2025-09-04 DIAGNOSIS — F41.1 GAD (GENERALIZED ANXIETY DISORDER): ICD-10-CM

## 2025-09-04 DIAGNOSIS — N18.32 STAGE 3B CHRONIC KIDNEY DISEASE (H): ICD-10-CM

## 2025-09-04 RX ORDER — DEXTROAMPHETAMINE SACCHARATE, AMPHETAMINE ASPARTATE, DEXTROAMPHETAMINE SULFATE AND AMPHETAMINE SULFATE 7.5; 7.5; 7.5; 7.5 MG/1; MG/1; MG/1; MG/1
30 TABLET ORAL 2 TIMES DAILY
Qty: 60 TABLET | Refills: 0 | Status: SHIPPED | OUTPATIENT
Start: 2025-11-21

## 2025-09-04 ASSESSMENT — ANXIETY QUESTIONNAIRES
5. BEING SO RESTLESS THAT IT IS HARD TO SIT STILL: NOT AT ALL
8. IF YOU CHECKED OFF ANY PROBLEMS, HOW DIFFICULT HAVE THESE MADE IT FOR YOU TO DO YOUR WORK, TAKE CARE OF THINGS AT HOME, OR GET ALONG WITH OTHER PEOPLE?: VERY DIFFICULT
7. FEELING AFRAID AS IF SOMETHING AWFUL MIGHT HAPPEN: NOT AT ALL
2. NOT BEING ABLE TO STOP OR CONTROL WORRYING: SEVERAL DAYS
4. TROUBLE RELAXING: SEVERAL DAYS
IF YOU CHECKED OFF ANY PROBLEMS ON THIS QUESTIONNAIRE, HOW DIFFICULT HAVE THESE PROBLEMS MADE IT FOR YOU TO DO YOUR WORK, TAKE CARE OF THINGS AT HOME, OR GET ALONG WITH OTHER PEOPLE: VERY DIFFICULT
6. BECOMING EASILY ANNOYED OR IRRITABLE: NOT AT ALL
3. WORRYING TOO MUCH ABOUT DIFFERENT THINGS: NOT AT ALL
1. FEELING NERVOUS, ANXIOUS, OR ON EDGE: NOT AT ALL
GAD7 TOTAL SCORE: 2
GAD7 TOTAL SCORE: 2
7. FEELING AFRAID AS IF SOMETHING AWFUL MIGHT HAPPEN: NOT AT ALL
GAD7 TOTAL SCORE: 2

## (undated) DEVICE — TUBING SUCTION 10'X3/16" N510

## (undated) DEVICE — WIRE GUIDE 0.025"X450CM ANG VISIGLIDE G-240-2545A

## (undated) DEVICE — ENDO FUSION OMNI-TOME G31903

## (undated) DEVICE — DRSG TEGADERM 4X4 3/4" 1626W

## (undated) DEVICE — SNARE CAPIVATOR II POLYPECTOMY 25X240MM M00561190

## (undated) DEVICE — ENDO FORCEP ENDOJAW BIOPSY 2.8MMX230CM FB-220U

## (undated) DEVICE — CATH RETRIEVAL BALLOON EXTRACTOR PRO RX-S INJ ABOVE 15-18MM

## (undated) DEVICE — HYDROGEN PEROXIDE 3% 16OZ D0012

## (undated) DEVICE — Device

## (undated) DEVICE — PACKING IODOFORM STRIP 1/2" 7832

## (undated) DEVICE — SYR 30ML SLIP TIP W/O NDL 302833

## (undated) DEVICE — ENDO DEVICE LOCKING AND BIOPSY CAP M00545261

## (undated) DEVICE — PITCHER STERILE 1000ML  SSK9004A

## (undated) DEVICE — ESU ERBE FIAPC PROBE 2.3MMX220CM

## (undated) DEVICE — TUBE TRANS GASTROJEJUNOSTOMY ENFIT 18FRX45CM 8250-18

## (undated) DEVICE — CATH BALLOON ELATION ESOPH/PYL/COL 15-16.5-18MMX240CM EPCB15

## (undated) DEVICE — SOL NACL 0.9% IRRIG 1000ML BOTTLE 07138-09

## (undated) DEVICE — PREP POVIDONE IODINE SOLUTION 10% 4OZ

## (undated) DEVICE — DRSG DRAIN 2X2" 7087

## (undated) DEVICE — PREP CHLORAPREP 26ML TINTED ORANGE  260815

## (undated) DEVICE — PREP POVIDONE IODINE SCRUB 7.5% 4OZ APL82212

## (undated) DEVICE — ENDO CAP AND TUBING STERILE FOR ENDOGATOR  100130

## (undated) DEVICE — ENDO CONNECTOR ENDOGATOR AUX WATER JET FOR OLYMPUS SCOPE

## (undated) DEVICE — BIOPSY VALVE BIOSHIELD 00711135

## (undated) DEVICE — PREP CHLORAPREP 26ML TINTED HI-LITE ORANGE 930815

## (undated) DEVICE — ENDO KIT OVESCO CLIP OTSC SYSTEM 12/6GC 165CM 100.27

## (undated) DEVICE — WIRE GUIDE 0.025"X270CM ANG VISIGLIDE G-240-2527A

## (undated) DEVICE — GUIDEWIRE TERUMO .035X260 3CM STR TIP GS3504

## (undated) DEVICE — PREP SKIN SCRUB TRAY 4461A

## (undated) DEVICE — TUBE GASTROSTOMY PEG SET 24FR G22636 PEG-24-PULL-S

## (undated) DEVICE — SUCTION MANIFOLD NEPTUNE 2 SYS 4 PORT 0702-020-000

## (undated) DEVICE — WIRE GUIDE 0.025"X270CM STR VISIGLIDE G-240-2527S

## (undated) DEVICE — PAD CHUX UNDERPAD 23X24" 7136

## (undated) DEVICE — DRAPE SHEET REV FOLD 3/4 9349

## (undated) DEVICE — SNARE CAPTIVATOR II POLYPECTOMY 20X240MM M00561242

## (undated) DEVICE — DRSG PRIMAPORE 02X3" 7133

## (undated) DEVICE — DRAIN SYSTEM ENTERAL W/ENFIT CONNECTORS 250ML EDS-250

## (undated) DEVICE — DRAPE C-ARM W/STRAPS 42X72" 07-CA104

## (undated) DEVICE — ENDO BITE BLOCK ADULT OMNI-BLOC

## (undated) DEVICE — DURACLIP

## (undated) DEVICE — DRAPE MAYO STAND 23X54 8337

## (undated) DEVICE — INFLATION DEVICE BIG 60 ENDO-AN6012

## (undated) DEVICE — ENDO PROBE COVER ULTRASOUND BALLOON LATEX  MAJ-249

## (undated) DEVICE — CATH BALLOON ELATION ESOPH/PYLORIC 12-13.5-15MMX180CM EPB12

## (undated) DEVICE — SOL WATER IRRIG 1000ML BOTTLE 2F7114

## (undated) DEVICE — ENDO TUBING CO2 SMARTCAP STERILE DISP 100145CO2EXT

## (undated) DEVICE — DRAPE SHEET MED 44X70" 9355

## (undated) DEVICE — ESU GROUND PAD ADULT W/CORD E7507

## (undated) DEVICE — SYR 50ML CATH TIP W/O NDL 309620

## (undated) DEVICE — LABEL MEDICATION SYSTEM 3303-P

## (undated) DEVICE — PACK ENDOSCOPY GI CUSTOM UMMC

## (undated) DEVICE — PEN MARKING SKIN TYCO DEVON DUAL TIP 31145868

## (undated) DEVICE — KIT ENDO FIRST STEP DISINFECTANT 200ML W/POUCH EP-4

## (undated) DEVICE — INTR PEELAWAY 22FRX13CM G04096 PLVW-22.0-38

## (undated) DEVICE — GLIDEWIRE TERUMO .035X180 ANG STIFF GS3508

## (undated) DEVICE — LINEN TOWEL PACK X5 5464

## (undated) DEVICE — DRSG GAUZE 4X4" TRAY 6939

## (undated) DEVICE — SU VICRYL 0 TIE 12X18" J906G

## (undated) DEVICE — DEVICE SUTURE GRASPER TROCAR CLOSURE 14GA PMITCSG

## (undated) DEVICE — GLOVE PROTEXIS POWDER FREE SMT 8.0  2D72PT80X

## (undated) DEVICE — KIT CONNECTOR FOR OLYMPUS ENDOSCOPES DEFENDO 100310

## (undated) DEVICE — SNARE CAPIVATOR II 15MM M00561232

## (undated) DEVICE — CATH RETRIEVAL BALLOON EXTRACTOR PRO RX-S INJ ABOVE 9-12MM

## (undated) DEVICE — NDL COUNTER 20CT 31142493

## (undated) DEVICE — INTRODUCER KIT GASTROSTOMY MIC G 22FR 98433

## (undated) RX ORDER — FENTANYL CITRATE 50 UG/ML
INJECTION, SOLUTION INTRAMUSCULAR; INTRAVENOUS
Status: DISPENSED
Start: 2021-07-13

## (undated) RX ORDER — FENTANYL CITRATE 50 UG/ML
INJECTION, SOLUTION INTRAMUSCULAR; INTRAVENOUS
Status: DISPENSED
Start: 2021-08-06

## (undated) RX ORDER — SIMETHICONE 40MG/0.6ML
SUSPENSION, DROPS(FINAL DOSAGE FORM)(ML) ORAL
Status: DISPENSED
Start: 2021-08-11

## (undated) RX ORDER — METOPROLOL TARTRATE 50 MG
TABLET ORAL
Status: DISPENSED
Start: 2022-07-05

## (undated) RX ORDER — ONDANSETRON 2 MG/ML
INJECTION INTRAMUSCULAR; INTRAVENOUS
Status: DISPENSED
Start: 2021-08-06

## (undated) RX ORDER — FENTANYL CITRATE-0.9 % NACL/PF 10 MCG/ML
PLASTIC BAG, INJECTION (ML) INTRAVENOUS
Status: DISPENSED
Start: 2021-09-13

## (undated) RX ORDER — PROPOFOL 10 MG/ML
INJECTION, EMULSION INTRAVENOUS
Status: DISPENSED
Start: 2021-08-11

## (undated) RX ORDER — LIDOCAINE HYDROCHLORIDE 10 MG/ML
INJECTION, SOLUTION EPIDURAL; INFILTRATION; INTRACAUDAL; PERINEURAL
Status: DISPENSED
Start: 2021-07-14

## (undated) RX ORDER — FENTANYL CITRATE 50 UG/ML
INJECTION, SOLUTION INTRAMUSCULAR; INTRAVENOUS
Status: DISPENSED
Start: 2021-09-13

## (undated) RX ORDER — FENTANYL CITRATE-0.9 % NACL/PF 10 MCG/ML
PLASTIC BAG, INJECTION (ML) INTRAVENOUS
Status: DISPENSED
Start: 2021-07-21

## (undated) RX ORDER — FENTANYL CITRATE 50 UG/ML
INJECTION, SOLUTION INTRAMUSCULAR; INTRAVENOUS
Status: DISPENSED
Start: 2021-07-28

## (undated) RX ORDER — LIDOCAINE HYDROCHLORIDE 10 MG/ML
INJECTION, SOLUTION EPIDURAL; INFILTRATION; INTRACAUDAL; PERINEURAL
Status: DISPENSED
Start: 2021-07-21

## (undated) RX ORDER — NITROGLYCERIN 0.4 MG/1
TABLET SUBLINGUAL
Status: DISPENSED
Start: 2022-07-05

## (undated) RX ORDER — CEFAZOLIN SODIUM 1 G/3ML
INJECTION, POWDER, FOR SOLUTION INTRAMUSCULAR; INTRAVENOUS
Status: DISPENSED
Start: 2021-07-13

## (undated) RX ORDER — CEFAZOLIN SODIUM 2 G/100ML
INJECTION, SOLUTION INTRAVENOUS
Status: DISPENSED
Start: 2021-09-20

## (undated) RX ORDER — METOPROLOL TARTRATE 1 MG/ML
INJECTION, SOLUTION INTRAVENOUS
Status: DISPENSED
Start: 2022-07-05

## (undated) RX ORDER — DEXAMETHASONE SODIUM PHOSPHATE 4 MG/ML
INJECTION, SOLUTION INTRA-ARTICULAR; INTRALESIONAL; INTRAMUSCULAR; INTRAVENOUS; SOFT TISSUE
Status: DISPENSED
Start: 2021-09-13

## (undated) RX ORDER — GLYCOPYRROLATE 0.2 MG/ML
INJECTION, SOLUTION INTRAMUSCULAR; INTRAVENOUS
Status: DISPENSED
Start: 2021-07-21

## (undated) RX ORDER — ESMOLOL HYDROCHLORIDE 10 MG/ML
INJECTION INTRAVENOUS
Status: DISPENSED
Start: 2021-08-11

## (undated) RX ORDER — PROPOFOL 10 MG/ML
INJECTION, EMULSION INTRAVENOUS
Status: DISPENSED
Start: 2021-07-28

## (undated) RX ORDER — FENTANYL CITRATE-0.9 % NACL/PF 10 MCG/ML
PLASTIC BAG, INJECTION (ML) INTRAVENOUS
Status: DISPENSED
Start: 2021-07-28

## (undated) RX ORDER — LIDOCAINE HYDROCHLORIDE 20 MG/ML
INJECTION, SOLUTION EPIDURAL; INFILTRATION; INTRACAUDAL; PERINEURAL
Status: DISPENSED
Start: 2021-07-28

## (undated) RX ORDER — FENTANYL CITRATE-0.9 % NACL/PF 10 MCG/ML
PLASTIC BAG, INJECTION (ML) INTRAVENOUS
Status: DISPENSED
Start: 2021-08-06

## (undated) RX ORDER — DEXAMETHASONE SODIUM PHOSPHATE 4 MG/ML
INJECTION, SOLUTION INTRA-ARTICULAR; INTRALESIONAL; INTRAMUSCULAR; INTRAVENOUS; SOFT TISSUE
Status: DISPENSED
Start: 2021-08-06

## (undated) RX ORDER — IVABRADINE 5 MG/1
TABLET, FILM COATED ORAL
Status: DISPENSED
Start: 2022-07-05

## (undated) RX ORDER — FENTANYL CITRATE 50 UG/ML
INJECTION, SOLUTION INTRAMUSCULAR; INTRAVENOUS
Status: DISPENSED
Start: 2021-09-20

## (undated) RX ORDER — LIDOCAINE HYDROCHLORIDE 10 MG/ML
INJECTION, SOLUTION EPIDURAL; INFILTRATION; INTRACAUDAL; PERINEURAL
Status: DISPENSED
Start: 2021-06-30

## (undated) RX ORDER — HYDROMORPHONE HYDROCHLORIDE 1 MG/ML
INJECTION, SOLUTION INTRAMUSCULAR; INTRAVENOUS; SUBCUTANEOUS
Status: DISPENSED
Start: 2021-09-20

## (undated) RX ORDER — LIDOCAINE HYDROCHLORIDE 20 MG/ML
INJECTION, SOLUTION EPIDURAL; INFILTRATION; INTRACAUDAL; PERINEURAL
Status: DISPENSED
Start: 2021-08-06

## (undated) RX ORDER — EPHEDRINE SULFATE 50 MG/ML
INJECTION, SOLUTION INTRAMUSCULAR; INTRAVENOUS; SUBCUTANEOUS
Status: DISPENSED
Start: 2021-09-13

## (undated) RX ORDER — LIDOCAINE HYDROCHLORIDE 20 MG/ML
INJECTION, SOLUTION EPIDURAL; INFILTRATION; INTRACAUDAL; PERINEURAL
Status: DISPENSED
Start: 2021-07-21

## (undated) RX ORDER — FENTANYL CITRATE-0.9 % NACL/PF 10 MCG/ML
PLASTIC BAG, INJECTION (ML) INTRAVENOUS
Status: DISPENSED
Start: 2021-09-20

## (undated) RX ORDER — ONDANSETRON 4 MG/1
TABLET, ORALLY DISINTEGRATING ORAL
Status: DISPENSED
Start: 2021-02-09

## (undated) RX ORDER — PROPOFOL 10 MG/ML
INJECTION, EMULSION INTRAVENOUS
Status: DISPENSED
Start: 2021-07-21

## (undated) RX ORDER — ONDANSETRON 2 MG/ML
INJECTION INTRAMUSCULAR; INTRAVENOUS
Status: DISPENSED
Start: 2021-09-13

## (undated) RX ORDER — ONDANSETRON 2 MG/ML
INJECTION INTRAMUSCULAR; INTRAVENOUS
Status: DISPENSED
Start: 2021-07-21

## (undated) RX ORDER — EPINEPHRINE 1 MG/ML
INJECTION, SOLUTION INTRAMUSCULAR; SUBCUTANEOUS
Status: DISPENSED
Start: 2021-08-11

## (undated) RX ORDER — ALBUMIN, HUMAN INJ 5% 5 %
SOLUTION INTRAVENOUS
Status: DISPENSED
Start: 2021-08-11

## (undated) RX ORDER — FENTANYL CITRATE 50 UG/ML
INJECTION, SOLUTION INTRAMUSCULAR; INTRAVENOUS
Status: DISPENSED
Start: 2021-07-21

## (undated) RX ORDER — PROPOFOL 10 MG/ML
INJECTION, EMULSION INTRAVENOUS
Status: DISPENSED
Start: 2021-08-06

## (undated) RX ORDER — LIDOCAINE HYDROCHLORIDE 20 MG/ML
INJECTION, SOLUTION EPIDURAL; INFILTRATION; INTRACAUDAL; PERINEURAL
Status: DISPENSED
Start: 2021-09-13

## (undated) RX ORDER — FENTANYL CITRATE 50 UG/ML
INJECTION, SOLUTION INTRAMUSCULAR; INTRAVENOUS
Status: DISPENSED
Start: 2021-07-14

## (undated) RX ORDER — CEFAZOLIN SODIUM 2 G/100ML
INJECTION, SOLUTION INTRAVENOUS
Status: DISPENSED
Start: 2021-09-13

## (undated) RX ORDER — IOPAMIDOL 510 MG/ML
INJECTION, SOLUTION INTRAVASCULAR
Status: DISPENSED
Start: 2021-08-11

## (undated) RX ORDER — FENTANYL CITRATE-0.9 % NACL/PF 10 MCG/ML
PLASTIC BAG, INJECTION (ML) INTRAVENOUS
Status: DISPENSED
Start: 2021-08-11

## (undated) RX ORDER — FENTANYL CITRATE 50 UG/ML
INJECTION, SOLUTION INTRAMUSCULAR; INTRAVENOUS
Status: DISPENSED
Start: 2021-08-11

## (undated) RX ORDER — CIPROFLOXACIN 2 MG/ML
INJECTION, SOLUTION INTRAVENOUS
Status: DISPENSED
Start: 2021-07-28

## (undated) RX ORDER — HYDROMORPHONE HCL IN WATER/PF 6 MG/30 ML
PATIENT CONTROLLED ANALGESIA SYRINGE INTRAVENOUS
Status: DISPENSED
Start: 2021-07-28

## (undated) RX ORDER — DEXAMETHASONE SODIUM PHOSPHATE 4 MG/ML
INJECTION, SOLUTION INTRA-ARTICULAR; INTRALESIONAL; INTRAMUSCULAR; INTRAVENOUS; SOFT TISSUE
Status: DISPENSED
Start: 2021-07-21

## (undated) RX ORDER — FENTANYL CITRATE 50 UG/ML
INJECTION, SOLUTION INTRAMUSCULAR; INTRAVENOUS
Status: DISPENSED
Start: 2021-06-30

## (undated) RX ORDER — ONDANSETRON 2 MG/ML
INJECTION INTRAMUSCULAR; INTRAVENOUS
Status: DISPENSED
Start: 2021-08-11

## (undated) RX ORDER — LIDOCAINE HYDROCHLORIDE 20 MG/ML
INJECTION, SOLUTION EPIDURAL; INFILTRATION; INTRACAUDAL; PERINEURAL
Status: DISPENSED
Start: 2021-08-11

## (undated) RX ORDER — EPHEDRINE SULFATE 50 MG/ML
INJECTION, SOLUTION INTRAMUSCULAR; INTRAVENOUS; SUBCUTANEOUS
Status: DISPENSED
Start: 2021-07-28

## (undated) RX ORDER — HYDROMORPHONE HYDROCHLORIDE 1 MG/ML
INJECTION, SOLUTION INTRAMUSCULAR; INTRAVENOUS; SUBCUTANEOUS
Status: DISPENSED
Start: 2021-08-06

## (undated) RX ORDER — HYDROMORPHONE HYDROCHLORIDE 1 MG/ML
INJECTION, SOLUTION INTRAMUSCULAR; INTRAVENOUS; SUBCUTANEOUS
Status: DISPENSED
Start: 2021-09-13

## (undated) RX ORDER — PROPOFOL 10 MG/ML
INJECTION, EMULSION INTRAVENOUS
Status: DISPENSED
Start: 2021-09-13

## (undated) RX ORDER — HYDROMORPHONE HYDROCHLORIDE 1 MG/ML
INJECTION, SOLUTION INTRAMUSCULAR; INTRAVENOUS; SUBCUTANEOUS
Status: DISPENSED
Start: 2021-07-13